# Patient Record
Sex: FEMALE | Race: BLACK OR AFRICAN AMERICAN | NOT HISPANIC OR LATINO | Employment: UNEMPLOYED | URBAN - METROPOLITAN AREA
[De-identification: names, ages, dates, MRNs, and addresses within clinical notes are randomized per-mention and may not be internally consistent; named-entity substitution may affect disease eponyms.]

---

## 2020-10-25 ENCOUNTER — HOSPITAL ENCOUNTER (EMERGENCY)
Facility: CLINIC | Age: 45
Discharge: HOME | End: 2020-10-25
Attending: EMERGENCY MEDICINE | Admitting: EMERGENCY MEDICINE
Payer: MEDICAID

## 2020-10-25 ENCOUNTER — APPOINTMENT (OUTPATIENT)
Dept: ULTRASOUND IMAGING | Facility: CLINIC | Age: 45
End: 2020-10-25
Attending: EMERGENCY MEDICINE
Payer: MEDICAID

## 2020-10-25 VITALS
SYSTOLIC BLOOD PRESSURE: 139 MMHG | DIASTOLIC BLOOD PRESSURE: 94 MMHG | WEIGHT: 241 LBS | RESPIRATION RATE: 16 BRPM | OXYGEN SATURATION: 99 % | HEART RATE: 71 BPM | TEMPERATURE: 97.9 F

## 2020-10-25 DIAGNOSIS — N63.0 BREAST MASS: ICD-10-CM

## 2020-10-25 LAB
ALBUMIN SERPL-MCNC: 3.5 G/DL (ref 3.4–5)
ALP SERPL-CCNC: 117 U/L (ref 40–150)
ALT SERPL W P-5'-P-CCNC: 23 U/L (ref 0–50)
ANION GAP SERPL CALCULATED.3IONS-SCNC: 1 MMOL/L (ref 3–14)
AST SERPL W P-5'-P-CCNC: 12 U/L (ref 0–45)
BASOPHILS # BLD AUTO: 0 10E9/L (ref 0–0.2)
BASOPHILS NFR BLD AUTO: 0.4 %
BILIRUB SERPL-MCNC: 0.3 MG/DL (ref 0.2–1.3)
BUN SERPL-MCNC: 11 MG/DL (ref 7–30)
CALCIUM SERPL-MCNC: 8.8 MG/DL (ref 8.5–10.1)
CHLORIDE SERPL-SCNC: 110 MMOL/L (ref 94–109)
CO2 SERPL-SCNC: 30 MMOL/L (ref 20–32)
CREAT SERPL-MCNC: 0.74 MG/DL (ref 0.52–1.04)
DIFFERENTIAL METHOD BLD: NORMAL
EOSINOPHIL # BLD AUTO: 0.1 10E9/L (ref 0–0.7)
EOSINOPHIL NFR BLD AUTO: 1.9 %
ERYTHROCYTE [DISTWIDTH] IN BLOOD BY AUTOMATED COUNT: 13.2 % (ref 10–15)
GFR SERPL CREATININE-BSD FRML MDRD: >90 ML/MIN/{1.73_M2}
GLUCOSE SERPL-MCNC: 96 MG/DL (ref 70–99)
HCT VFR BLD AUTO: 40.3 % (ref 35–47)
HGB BLD-MCNC: 12.8 G/DL (ref 11.7–15.7)
IMM GRANULOCYTES # BLD: 0 10E9/L (ref 0–0.4)
IMM GRANULOCYTES NFR BLD: 0.3 %
LYMPHOCYTES # BLD AUTO: 2.6 10E9/L (ref 0.8–5.3)
LYMPHOCYTES NFR BLD AUTO: 36.1 %
MCH RBC QN AUTO: 28.5 PG (ref 26.5–33)
MCHC RBC AUTO-ENTMCNC: 31.8 G/DL (ref 31.5–36.5)
MCV RBC AUTO: 90 FL (ref 78–100)
MONOCYTES # BLD AUTO: 0.6 10E9/L (ref 0–1.3)
MONOCYTES NFR BLD AUTO: 7.9 %
NEUTROPHILS # BLD AUTO: 3.8 10E9/L (ref 1.6–8.3)
NEUTROPHILS NFR BLD AUTO: 53.4 %
NRBC # BLD AUTO: 0 10*3/UL
NRBC BLD AUTO-RTO: 0 /100
PLATELET # BLD AUTO: 259 10E9/L (ref 150–450)
POTASSIUM SERPL-SCNC: 4 MMOL/L (ref 3.4–5.3)
PROT SERPL-MCNC: 8 G/DL (ref 6.8–8.8)
RBC # BLD AUTO: 4.49 10E12/L (ref 3.8–5.2)
SODIUM SERPL-SCNC: 141 MMOL/L (ref 133–144)
WBC # BLD AUTO: 7.2 10E9/L (ref 4–11)

## 2020-10-25 PROCEDURE — 85025 COMPLETE CBC W/AUTO DIFF WBC: CPT | Performed by: EMERGENCY MEDICINE

## 2020-10-25 PROCEDURE — 76642 ULTRASOUND BREAST LIMITED: CPT | Mod: LT

## 2020-10-25 PROCEDURE — 80053 COMPREHEN METABOLIC PANEL: CPT | Performed by: EMERGENCY MEDICINE

## 2020-10-25 PROCEDURE — 99284 EMERGENCY DEPT VISIT MOD MDM: CPT | Mod: 25 | Performed by: EMERGENCY MEDICINE

## 2020-10-25 PROCEDURE — 99284 EMERGENCY DEPT VISIT MOD MDM: CPT | Performed by: EMERGENCY MEDICINE

## 2020-10-25 ASSESSMENT — ENCOUNTER SYMPTOMS
FEVER: 0
SHORTNESS OF BREATH: 0
ABDOMINAL PAIN: 0

## 2020-10-25 NOTE — DISCHARGE INSTRUCTIONS
You need to have a mammogram. Please contact the [facility] breast center at [phone] to schedule a mammogram.  You also need to follow-up with the OB/GYN clinic.  This is a [facility].  Call [phone] to make an appointment

## 2020-10-25 NOTE — ED AVS SNAPSHOT
[facility] Emergency Department  [address]  [place] [address]  Phone: [phone]  Fax: [phone]                                    Teresa Sanderson   MRN: [id number]    Department: [facility] Emergency Department   Date of Visit: 10/25/2020           After Visit Summary Signature Page    I have received my discharge instructions, and my questions have been answered. I have discussed any challenges I see with this plan with the nurse or doctor.    ..........................................................................................................................................  Patient/Patient Representative Signature      ..........................................................................................................................................  Patient Representative Print Name and Relationship to Patient    ..................................................               ................................................  Date                                   Time    ..........................................................................................................................................  Reviewed by Signature/Title    ...................................................              ..............................................  Date                                               Time          22EPIC Rev 08/18
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
## 2020-11-09 ENCOUNTER — TELEPHONE (OUTPATIENT)
Dept: MAMMOGRAPHY | Facility: CLINIC | Age: 45
End: 2020-11-09

## 2020-11-09 NOTE — TELEPHONE ENCOUNTER
Left a message for Teresa regarding the recommended follow up imaging from her visit to the ER on 10/25/2020.  Awaiting return phone call, call back number left was [phone].

## 2020-12-05 ENCOUNTER — APPOINTMENT (OUTPATIENT)
Dept: CT IMAGING | Facility: CLINIC | Age: 45
End: 2020-12-05
Payer: MEDICAID

## 2020-12-05 ENCOUNTER — APPOINTMENT (OUTPATIENT)
Dept: GENERAL RADIOLOGY | Facility: CLINIC | Age: 45
End: 2020-12-05
Attending: PHYSICIAN ASSISTANT
Payer: MEDICAID

## 2020-12-05 ENCOUNTER — APPOINTMENT (OUTPATIENT)
Dept: MRI IMAGING | Facility: CLINIC | Age: 45
End: 2020-12-05
Payer: MEDICAID

## 2020-12-05 ENCOUNTER — APPOINTMENT (OUTPATIENT)
Dept: CT IMAGING | Facility: CLINIC | Age: 45
End: 2020-12-05
Attending: PHYSICIAN ASSISTANT
Payer: MEDICAID

## 2020-12-05 ENCOUNTER — HOSPITAL ENCOUNTER (INPATIENT)
Facility: CLINIC | Age: 45
LOS: 5 days | Discharge: HOME | End: 2020-12-10
Attending: FAMILY MEDICINE | Admitting: INTERNAL MEDICINE
Payer: MEDICAID

## 2020-12-05 DIAGNOSIS — C79.51 SPINE METASTASIS: ICD-10-CM

## 2020-12-05 DIAGNOSIS — R31.9 URINARY TRACT INFECTION WITH HEMATURIA: ICD-10-CM

## 2020-12-05 DIAGNOSIS — N63.0 BREAST LUMP OR MASS: ICD-10-CM

## 2020-12-05 DIAGNOSIS — N39.0 URINARY TRACT INFECTION WITH HEMATURIA: ICD-10-CM

## 2020-12-05 DIAGNOSIS — Z12.31 VISIT FOR SCREENING MAMMOGRAM: ICD-10-CM

## 2020-12-05 DIAGNOSIS — U07.1 COVID-19 VIRUS INFECTION: ICD-10-CM

## 2020-12-05 DIAGNOSIS — N39.0 URINARY TRACT INFECTION WITHOUT HEMATURIA, SITE UNSPECIFIED: ICD-10-CM

## 2020-12-05 DIAGNOSIS — R92.8 ABNORMAL MAMMOGRAM OF LEFT BREAST: ICD-10-CM

## 2020-12-05 DIAGNOSIS — R31.9 HEMATURIA SYNDROME: ICD-10-CM

## 2020-12-05 DIAGNOSIS — N63.0 BREAST MASS: ICD-10-CM

## 2020-12-05 LAB
ABO + RH BLD: NORMAL
ABO + RH BLD: NORMAL
ALBUMIN SERPL-MCNC: 3.3 G/DL (ref 3.4–5)
ALBUMIN UR-MCNC: 10 MG/DL
ALP SERPL-CCNC: 127 U/L (ref 40–150)
ALT SERPL W P-5'-P-CCNC: 18 U/L (ref 0–50)
ANION GAP SERPL CALCULATED.3IONS-SCNC: 3 MMOL/L (ref 3–14)
APPEARANCE UR: CLEAR
APTT PPP: 29 SEC (ref 22–37)
AST SERPL W P-5'-P-CCNC: 21 U/L (ref 0–45)
BASOPHILS # BLD AUTO: 0 10E9/L (ref 0–0.2)
BASOPHILS NFR BLD AUTO: 0.3 %
BILIRUB SERPL-MCNC: 0.5 MG/DL (ref 0.2–1.3)
BILIRUB UR QL STRIP: NEGATIVE
BUN SERPL-MCNC: 7 MG/DL (ref 7–30)
CALCIUM SERPL-MCNC: 8.8 MG/DL (ref 8.5–10.1)
CHLORIDE SERPL-SCNC: 109 MMOL/L (ref 94–109)
CK SERPL-CCNC: 60 U/L (ref 30–225)
CO2 SERPL-SCNC: 27 MMOL/L (ref 20–32)
COLOR UR AUTO: YELLOW
CREAT SERPL-MCNC: 0.59 MG/DL (ref 0.52–1.04)
CRP SERPL-MCNC: 56 MG/L (ref 0–8)
D DIMER PPP FEU-MCNC: 1.3 UG/ML FEU (ref 0–0.5)
DIFFERENTIAL METHOD BLD: NORMAL
EOSINOPHIL # BLD AUTO: 0.1 10E9/L (ref 0–0.7)
EOSINOPHIL NFR BLD AUTO: 1.4 %
ERYTHROCYTE [DISTWIDTH] IN BLOOD BY AUTOMATED COUNT: 13.7 % (ref 10–15)
FERRITIN SERPL-MCNC: 64 NG/ML (ref 8–252)
FIBRINOGEN PPP-MCNC: 529 MG/DL (ref 200–420)
GFR SERPL CREATININE-BSD FRML MDRD: >90 ML/MIN/{1.73_M2}
GLUCOSE SERPL-MCNC: 107 MG/DL (ref 70–99)
GLUCOSE UR STRIP-MCNC: NEGATIVE MG/DL
HCG SERPL QL: NEGATIVE
HCT VFR BLD AUTO: 38.1 % (ref 35–47)
HGB BLD-MCNC: 12.1 G/DL (ref 11.7–15.7)
HGB UR QL STRIP: NEGATIVE
IMM GRANULOCYTES # BLD: 0 10E9/L (ref 0–0.4)
IMM GRANULOCYTES NFR BLD: 0.4 %
INR PPP: 1.13 (ref 0.86–1.14)
KETONES UR STRIP-MCNC: NEGATIVE MG/DL
LABORATORY COMMENT REPORT: ABNORMAL
LDH SERPL L TO P-CCNC: 151 U/L (ref 81–234)
LEUKOCYTE ESTERASE UR QL STRIP: ABNORMAL
LYMPHOCYTES # BLD AUTO: 1.9 10E9/L (ref 0.8–5.3)
LYMPHOCYTES NFR BLD AUTO: 27.1 %
MCH RBC QN AUTO: 28 PG (ref 26.5–33)
MCHC RBC AUTO-ENTMCNC: 31.8 G/DL (ref 31.5–36.5)
MCV RBC AUTO: 88 FL (ref 78–100)
MONOCYTES # BLD AUTO: 0.6 10E9/L (ref 0–1.3)
MONOCYTES NFR BLD AUTO: 8.2 %
MUCOUS THREADS #/AREA URNS LPF: PRESENT /LPF
NEUTROPHILS # BLD AUTO: 4.4 10E9/L (ref 1.6–8.3)
NEUTROPHILS NFR BLD AUTO: 62.6 %
NITRATE UR QL: NEGATIVE
NRBC # BLD AUTO: 0 10*3/UL
NRBC BLD AUTO-RTO: 0 /100
PH UR STRIP: 7.5 PH (ref 5–7)
PLATELET # BLD AUTO: 229 10E9/L (ref 150–450)
POTASSIUM SERPL-SCNC: 4.6 MMOL/L (ref 3.4–5.3)
PROT SERPL-MCNC: 8.2 G/DL (ref 6.8–8.8)
RBC # BLD AUTO: 4.32 10E12/L (ref 3.8–5.2)
RBC #/AREA URNS AUTO: 1 /HPF (ref 0–2)
RETICS # AUTO: 60.6 10E9/L (ref 25–95)
RETICS/RBC NFR AUTO: 1.6 % (ref 0.5–2)
SARS-COV-2 RNA SPEC QL NAA+PROBE: NORMAL
SARS-COV-2 RNA SPEC QL NAA+PROBE: POSITIVE
SODIUM SERPL-SCNC: 139 MMOL/L (ref 133–144)
SOURCE: ABNORMAL
SP GR UR STRIP: 1.02 (ref 1–1.03)
SPECIMEN EXP DATE BLD: NORMAL
SPECIMEN SOURCE: ABNORMAL
SPECIMEN SOURCE: NORMAL
SQUAMOUS #/AREA URNS AUTO: 3 /HPF (ref 0–1)
TROPONIN I SERPL-MCNC: <0.015 UG/L (ref 0–0.04)
UROBILINOGEN UR STRIP-MCNC: 2 MG/DL (ref 0–2)
WBC # BLD AUTO: 7.1 10E9/L (ref 4–11)
WBC #/AREA URNS AUTO: 15 /HPF (ref 0–5)

## 2020-12-05 PROCEDURE — 82728 ASSAY OF FERRITIN: CPT | Performed by: PHYSICIAN ASSISTANT

## 2020-12-05 PROCEDURE — 80053 COMPREHEN METABOLIC PANEL: CPT | Performed by: STUDENT IN AN ORGANIZED HEALTH CARE EDUCATION/TRAINING PROGRAM

## 2020-12-05 PROCEDURE — 250N000011 HC RX IP 250 OP 636: Performed by: FAMILY MEDICINE

## 2020-12-05 PROCEDURE — 96376 TX/PRO/DX INJ SAME DRUG ADON: CPT | Performed by: FAMILY MEDICINE

## 2020-12-05 PROCEDURE — U0003 INFECTIOUS AGENT DETECTION BY NUCLEIC ACID (DNA OR RNA); SEVERE ACUTE RESPIRATORY SYNDROME CORONAVIRUS 2 (SARS-COV-2) (CORONAVIRUS DISEASE [COVID-19]), AMPLIFIED PROBE TECHNIQUE, MAKING USE OF HIGH THROUGHPUT TECHNOLOGIES AS DESCRIBED BY CMS-2020-01-R: HCPCS | Performed by: STUDENT IN AN ORGANIZED HEALTH CARE EDUCATION/TRAINING PROGRAM

## 2020-12-05 PROCEDURE — 250N000011 HC RX IP 250 OP 636: Performed by: STUDENT IN AN ORGANIZED HEALTH CARE EDUCATION/TRAINING PROGRAM

## 2020-12-05 PROCEDURE — 96361 HYDRATE IV INFUSION ADD-ON: CPT | Performed by: FAMILY MEDICINE

## 2020-12-05 PROCEDURE — 86900 BLOOD TYPING SEROLOGIC ABO: CPT | Performed by: PHYSICIAN ASSISTANT

## 2020-12-05 PROCEDURE — 74176 CT ABD & PELVIS W/O CONTRAST: CPT

## 2020-12-05 PROCEDURE — 99207 PR CDG-MDM COMPONENT: MEETS MODERATE - UP CODED: CPT | Performed by: INTERNAL MEDICINE

## 2020-12-05 PROCEDURE — 258N000003 HC RX IP 258 OP 636: Performed by: STUDENT IN AN ORGANIZED HEALTH CARE EDUCATION/TRAINING PROGRAM

## 2020-12-05 PROCEDURE — 250N000013 HC RX MED GY IP 250 OP 250 PS 637: Performed by: FAMILY MEDICINE

## 2020-12-05 PROCEDURE — 71260 CT THORAX DX C+: CPT | Mod: 26 | Performed by: RADIOLOGY

## 2020-12-05 PROCEDURE — 84703 CHORIONIC GONADOTROPIN ASSAY: CPT | Performed by: STUDENT IN AN ORGANIZED HEALTH CARE EDUCATION/TRAINING PROGRAM

## 2020-12-05 PROCEDURE — 85610 PROTHROMBIN TIME: CPT | Performed by: PHYSICIAN ASSISTANT

## 2020-12-05 PROCEDURE — 99223 1ST HOSP IP/OBS HIGH 75: CPT | Mod: AI | Performed by: INTERNAL MEDICINE

## 2020-12-05 PROCEDURE — 96374 THER/PROPH/DIAG INJ IV PUSH: CPT | Performed by: FAMILY MEDICINE

## 2020-12-05 PROCEDURE — 85384 FIBRINOGEN ACTIVITY: CPT | Performed by: PHYSICIAN ASSISTANT

## 2020-12-05 PROCEDURE — 85025 COMPLETE CBC W/AUTO DIFF WBC: CPT | Performed by: STUDENT IN AN ORGANIZED HEALTH CARE EDUCATION/TRAINING PROGRAM

## 2020-12-05 PROCEDURE — 86901 BLOOD TYPING SEROLOGIC RH(D): CPT | Performed by: PHYSICIAN ASSISTANT

## 2020-12-05 PROCEDURE — 96375 TX/PRO/DX INJ NEW DRUG ADDON: CPT | Performed by: FAMILY MEDICINE

## 2020-12-05 PROCEDURE — 99207 PR APP CREDIT; MD BILLING SHARED VISIT: CPT | Performed by: PHYSICIAN ASSISTANT

## 2020-12-05 PROCEDURE — 88112 CYTOPATH CELL ENHANCE TECH: CPT | Mod: TC | Performed by: PHYSICIAN ASSISTANT

## 2020-12-05 PROCEDURE — 36415 COLL VENOUS BLD VENIPUNCTURE: CPT | Performed by: PHYSICIAN ASSISTANT

## 2020-12-05 PROCEDURE — 83615 LACTATE (LD) (LDH) ENZYME: CPT | Performed by: PHYSICIAN ASSISTANT

## 2020-12-05 PROCEDURE — 85379 FIBRIN DEGRADATION QUANT: CPT | Performed by: PHYSICIAN ASSISTANT

## 2020-12-05 PROCEDURE — 71045 X-RAY EXAM CHEST 1 VIEW: CPT

## 2020-12-05 PROCEDURE — 88120 CYTP URNE 3-5 PROBES EA SPEC: CPT | Mod: TC | Performed by: PHYSICIAN ASSISTANT

## 2020-12-05 PROCEDURE — 120N000002 HC R&B MED SURG/OB UMMC

## 2020-12-05 PROCEDURE — 88120 CYTP URNE 3-5 PROBES EA SPEC: CPT | Mod: 26 | Performed by: PATHOLOGY

## 2020-12-05 PROCEDURE — 85045 AUTOMATED RETICULOCYTE COUNT: CPT | Performed by: PHYSICIAN ASSISTANT

## 2020-12-05 PROCEDURE — 99285 EMERGENCY DEPT VISIT HI MDM: CPT | Mod: 25 | Performed by: FAMILY MEDICINE

## 2020-12-05 PROCEDURE — 71260 CT THORAX DX C+: CPT

## 2020-12-05 PROCEDURE — 82550 ASSAY OF CK (CPK): CPT | Performed by: PHYSICIAN ASSISTANT

## 2020-12-05 PROCEDURE — 71045 X-RAY EXAM CHEST 1 VIEW: CPT | Mod: 26 | Performed by: RADIOLOGY

## 2020-12-05 PROCEDURE — 250N000013 HC RX MED GY IP 250 OP 250 PS 637: Performed by: PHYSICIAN ASSISTANT

## 2020-12-05 PROCEDURE — 88112 CYTOPATH CELL ENHANCE TECH: CPT | Mod: 26 | Performed by: PATHOLOGY

## 2020-12-05 PROCEDURE — 86140 C-REACTIVE PROTEIN: CPT | Performed by: PHYSICIAN ASSISTANT

## 2020-12-05 PROCEDURE — 85730 THROMBOPLASTIN TIME PARTIAL: CPT | Performed by: PHYSICIAN ASSISTANT

## 2020-12-05 PROCEDURE — C9803 HOPD COVID-19 SPEC COLLECT: HCPCS | Performed by: FAMILY MEDICINE

## 2020-12-05 PROCEDURE — 72148 MRI LUMBAR SPINE W/O DYE: CPT

## 2020-12-05 PROCEDURE — 81001 URINALYSIS AUTO W/SCOPE: CPT | Performed by: STUDENT IN AN ORGANIZED HEALTH CARE EDUCATION/TRAINING PROGRAM

## 2020-12-05 PROCEDURE — 84484 ASSAY OF TROPONIN QUANT: CPT | Performed by: PHYSICIAN ASSISTANT

## 2020-12-05 PROCEDURE — 85300 ANTITHROMBIN III ACTIVITY: CPT | Performed by: PHYSICIAN ASSISTANT

## 2020-12-05 PROCEDURE — 99285 EMERGENCY DEPT VISIT HI MDM: CPT | Mod: GC | Performed by: FAMILY MEDICINE

## 2020-12-05 PROCEDURE — 74178 CT ABD&PLV WO CNTR FLWD CNTR: CPT | Mod: 26 | Performed by: RADIOLOGY

## 2020-12-05 PROCEDURE — 83520 IMMUNOASSAY QUANT NOS NONAB: CPT | Performed by: PHYSICIAN ASSISTANT

## 2020-12-05 RX ORDER — KETOROLAC TROMETHAMINE 30 MG/ML
30 INJECTION, SOLUTION INTRAMUSCULAR; INTRAVENOUS EVERY 6 HOURS PRN
Status: DISCONTINUED | OUTPATIENT
Start: 2020-12-05 | End: 2020-12-05

## 2020-12-05 RX ORDER — SODIUM CHLORIDE 9 MG/ML
INJECTION, SOLUTION INTRAVENOUS CONTINUOUS
Status: DISCONTINUED | OUTPATIENT
Start: 2020-12-05 | End: 2020-12-05

## 2020-12-05 RX ORDER — OXYCODONE HYDROCHLORIDE 5 MG/1
5 TABLET ORAL ONCE
Status: COMPLETED | OUTPATIENT
Start: 2020-12-05 | End: 2020-12-05

## 2020-12-05 RX ORDER — AMOXICILLIN 250 MG
1 CAPSULE ORAL 2 TIMES DAILY
Status: DISCONTINUED | OUTPATIENT
Start: 2020-12-05 | End: 2020-12-10 | Stop reason: HOSPADM

## 2020-12-05 RX ORDER — AMOXICILLIN 250 MG
2 CAPSULE ORAL 2 TIMES DAILY
Status: DISCONTINUED | OUTPATIENT
Start: 2020-12-05 | End: 2020-12-10 | Stop reason: HOSPADM

## 2020-12-05 RX ORDER — OXYCODONE HYDROCHLORIDE 5 MG/1
5-10 TABLET ORAL
Status: DISCONTINUED | OUTPATIENT
Start: 2020-12-05 | End: 2020-12-09

## 2020-12-05 RX ORDER — IOPAMIDOL 755 MG/ML
135 INJECTION, SOLUTION INTRAVASCULAR ONCE
Status: COMPLETED | OUTPATIENT
Start: 2020-12-05 | End: 2020-12-05

## 2020-12-05 RX ORDER — POLYETHYLENE GLYCOL 3350 17 G/17G
17 POWDER, FOR SOLUTION ORAL DAILY PRN
Status: DISCONTINUED | OUTPATIENT
Start: 2020-12-05 | End: 2020-12-10 | Stop reason: HOSPADM

## 2020-12-05 RX ORDER — HYDROMORPHONE HYDROCHLORIDE 1 MG/ML
0.3 INJECTION, SOLUTION INTRAMUSCULAR; INTRAVENOUS; SUBCUTANEOUS
Status: COMPLETED | OUTPATIENT
Start: 2020-12-05 | End: 2020-12-05

## 2020-12-05 RX ORDER — BISACODYL 10 MG
10 SUPPOSITORY, RECTAL RECTAL DAILY PRN
Status: DISCONTINUED | OUTPATIENT
Start: 2020-12-05 | End: 2020-12-10 | Stop reason: HOSPADM

## 2020-12-05 RX ORDER — NALOXONE HYDROCHLORIDE 0.4 MG/ML
0.2 INJECTION, SOLUTION INTRAMUSCULAR; INTRAVENOUS; SUBCUTANEOUS
Status: DISCONTINUED | OUTPATIENT
Start: 2020-12-05 | End: 2020-12-10 | Stop reason: HOSPADM

## 2020-12-05 RX ORDER — LIDOCAINE 40 MG/G
CREAM TOPICAL
Status: DISCONTINUED | OUTPATIENT
Start: 2020-12-05 | End: 2020-12-10 | Stop reason: HOSPADM

## 2020-12-05 RX ORDER — KETOROLAC TROMETHAMINE 30 MG/ML
30 INJECTION, SOLUTION INTRAMUSCULAR; INTRAVENOUS ONCE
Status: COMPLETED | OUTPATIENT
Start: 2020-12-05 | End: 2020-12-05

## 2020-12-05 RX ORDER — POLYETHYLENE GLYCOL 3350 17 G/17G
17 POWDER, FOR SOLUTION ORAL DAILY
Status: DISCONTINUED | OUTPATIENT
Start: 2020-12-05 | End: 2020-12-10 | Stop reason: HOSPADM

## 2020-12-05 RX ORDER — NICOTINE 21 MG/24HR
1 PATCH, TRANSDERMAL 24 HOURS TRANSDERMAL DAILY
Status: DISCONTINUED | OUTPATIENT
Start: 2020-12-05 | End: 2020-12-10 | Stop reason: HOSPADM

## 2020-12-05 RX ORDER — NALOXONE HYDROCHLORIDE 0.4 MG/ML
0.4 INJECTION, SOLUTION INTRAMUSCULAR; INTRAVENOUS; SUBCUTANEOUS
Status: DISCONTINUED | OUTPATIENT
Start: 2020-12-05 | End: 2020-12-10 | Stop reason: HOSPADM

## 2020-12-05 RX ORDER — ONDANSETRON 2 MG/ML
4 INJECTION INTRAMUSCULAR; INTRAVENOUS EVERY 6 HOURS PRN
Status: DISCONTINUED | OUTPATIENT
Start: 2020-12-05 | End: 2020-12-06

## 2020-12-05 RX ORDER — ONDANSETRON 4 MG/1
4 TABLET, ORALLY DISINTEGRATING ORAL EVERY 6 HOURS PRN
Status: DISCONTINUED | OUTPATIENT
Start: 2020-12-05 | End: 2020-12-10 | Stop reason: HOSPADM

## 2020-12-05 RX ORDER — ACETAMINOPHEN 325 MG/1
325 TABLET ORAL EVERY 4 HOURS PRN
Status: DISCONTINUED | OUTPATIENT
Start: 2020-12-05 | End: 2020-12-10 | Stop reason: HOSPADM

## 2020-12-05 RX ORDER — HYDROMORPHONE HYDROCHLORIDE 1 MG/ML
0.5 INJECTION, SOLUTION INTRAMUSCULAR; INTRAVENOUS; SUBCUTANEOUS
Status: DISCONTINUED | OUTPATIENT
Start: 2020-12-05 | End: 2020-12-09

## 2020-12-05 RX ADMIN — OXYCODONE HYDROCHLORIDE 5 MG: 5 TABLET ORAL at 21:39

## 2020-12-05 RX ADMIN — ACETAMINOPHEN 325 MG: 325 TABLET, FILM COATED ORAL at 21:40

## 2020-12-05 RX ADMIN — DOCUSATE SODIUM AND SENNOSIDES 1 TABLET: 8.6; 5 TABLET ORAL at 21:39

## 2020-12-05 RX ADMIN — OXYCODONE HYDROCHLORIDE 5 MG: 5 TABLET ORAL at 12:57

## 2020-12-05 RX ADMIN — KETOROLAC TROMETHAMINE 30 MG: 30 INJECTION, SOLUTION INTRAMUSCULAR at 10:32

## 2020-12-05 RX ADMIN — POLYETHYLENE GLYCOL 3350 17 G: 17 POWDER, FOR SOLUTION ORAL at 21:39

## 2020-12-05 RX ADMIN — KETOROLAC TROMETHAMINE 30 MG: 30 INJECTION, SOLUTION INTRAMUSCULAR at 16:27

## 2020-12-05 RX ADMIN — IOPAMIDOL 135 ML: 755 INJECTION, SOLUTION INTRAVENOUS at 20:51

## 2020-12-05 RX ADMIN — NICOTINE 1 PATCH: 14 PATCH, EXTENDED RELEASE TRANSDERMAL at 21:40

## 2020-12-05 RX ADMIN — HYDROMORPHONE HYDROCHLORIDE 0.3 MG: 1 INJECTION, SOLUTION INTRAMUSCULAR; INTRAVENOUS; SUBCUTANEOUS at 09:43

## 2020-12-05 RX ADMIN — SODIUM CHLORIDE 1000 ML: 9 INJECTION, SOLUTION INTRAVENOUS at 09:43

## 2020-12-05 ASSESSMENT — ACTIVITIES OF DAILY LIVING (ADL)
WEAR_GLASSES_OR_BLIND: NO
DOING_ERRANDS_INDEPENDENTLY_DIFFICULTY: NO
WALKING_OR_CLIMBING_STAIRS_DIFFICULTY: NO
CONCENTRATING,_REMEMBERING_OR_MAKING_DECISIONS_DIFFICULTY: NO
PATIENT_/_FAMILY_COMMUNICATION_STYLE: SPOKEN LANGUAGE (ENGLISH OR BILINGUAL)
DIFFICULTY_EATING/SWALLOWING: NO
DIFFICULTY_COMMUNICATING: NO
TOILETING_ISSUES: NO
ADLS_ACUITY_SCORE: 15
FALL_HISTORY_WITHIN_LAST_SIX_MONTHS: NO
DRESSING/BATHING_DIFFICULTY: NO
HEARING_DIFFICULTY_OR_DEAF: NO
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
## 2020-12-05 NOTE — ED PROVIDER NOTES
"ED Provider Note  [facility]      History     Chief Complaint   Patient presents with     Hematuria     Back Pain     lower     HPI  Teresa Sanderson is a 45 year old female with a history of DVT, pyelonephritis, and L breast mass presenting today with 3 days of blood in her urine and bilateral lower back pain. She reports that her first urination of every day has had blood in it, which resolves throughout the day. Her back pain is left > right, feels \"like a ton of bricks laying there.\" She also endorses LUQ pain and some pain radiating down her left leg. Has been feeling fatigued lately with loss of appetite, which she thinks is related to stress. She has self reported 45 lb weight loss since July.    Most recent period was 11/21. Denies any trauma to the genitourinary area. Denies diarrhea or constipation, SOB, pain with urination, abnormal vaginal discharge. No smoking history.    Of note, this patient was seen in this ED in Oct 2020 for a growing L sided breast mass. She reports that she first felt this mass in Nov 2019, had a mammogram in [place], and was told it was benign. At that time, the mass was less than quarter sized. She noticed that it has been growing for the last 5 months and came to the ED 10/2020 for pain, nipple discharge, and armpit tenderness. At that time, the breast US did not identify a mass or abscess in her breast but the ED provider was concerned for malignancy and recommended follow-up imaging and with OBGYN. Unfortunately, she was lost to follow-up until today.    Furthermore,  Ms. Sanderson has been on Warfarin and most recently, Eliquis for DVT prophylaxis. However, she has not been taking this for the last 5 months due to lack of PCP and insurance.    Social history: this patient moved from [place] in July to assist with daughter and grandchildren. She is essentially homeless, has no health insurance, or connections to a primary care provider.    Past " Medical History  History reviewed. No pertinent past medical history.  History reviewed. No pertinent surgical history.  No current outpatient medications on file.    No Known Allergies  Family History  No family history on file.  Social History   Social History     Tobacco Use     Smoking status: Never Smoker     Smokeless tobacco: Never Used   Substance Use Topics     Alcohol use: Not Currently     Comment: occ     Drug use: Yes     Types: Marijuana     Comment: occ      Past medical history, past surgical history, medications, allergies, family history, and social history were reviewed with the patient. No additional pertinent items.       Review of Systems  A complete review of systems was performed with pertinent positives and negatives noted in the HPI, and all other systems negative.    Physical Exam   BP: (!) 146/86(R arm)  Pulse: 80  Temp: 96.8  F (36  C)  Resp: 16  Weight: 104.3 kg (230 lb)  SpO2: 98 %  Physical Exam  Vitals signs reviewed.   Constitutional:       Comments: Appears uncomfortable laying on her back; holding her L flank   HENT:      Head: Normocephalic.   Eyes:      Conjunctiva/sclera: Conjunctivae normal.      Pupils: Pupils are equal, round, and reactive to light.   Neck:      Musculoskeletal: No neck rigidity.   Cardiovascular:      Rate and Rhythm: Normal rate and regular rhythm.      Pulses: Normal pulses.      Heart sounds: Normal heart sounds.   Pulmonary:      Effort: Pulmonary effort is normal.      Breath sounds: Normal breath sounds. No wheezing, rhonchi or rales.   Chest:      Breasts: Breasts are asymmetrical.         Right: Normal.         Left: Mass (approx 1.5in x 3in), nipple discharge (history of discharge but none seen today), skin change (skin pulling over superiolateral nipple) and tenderness (to light palpation over breast) present.       Abdominal:      General: Abdomen is flat.      Palpations: Abdomen is soft. There is no mass.      Tenderness: There is right CVA  tenderness, left CVA tenderness and guarding (RUQ). There is no rebound.      Hernia: No hernia is present.   Musculoskeletal:         General: Tenderness (mild down L thigh) present.      Lumbar back: She exhibits bony tenderness and pain.      Right lower leg: No edema.      Left lower leg: No edema.   Skin:     General: Skin is warm.   Neurological:      Mental Status: She is alert and oriented to person, place, and time.   Psychiatric:         Mood and Affect: Mood normal.         Judgment: Judgment normal.       ED Course     ED Course as of Dec 05 1447   Sat Dec 05, 2020   1342 Lumbar spine MRI w/o contrast - surgery >10 yrs or none        Results for orders placed or performed during the hospital encounter of 12/05/20   Abd/pelvis CT no contrast - [facility] Protocol     Status: None    Narrative    CT ABDOMEN AND PELVIS WITHOUT CONTRAST  12/5/2020 10:30 AM    CLINICAL HISTORY: Left flank pain.    TECHNIQUE: CT scan of the abdomen and pelvis was performed without IV  contrast. Multiplanar reformats were obtained. Dose reduction  techniques were used.  CONTRAST: None.    COMPARISON: None.    FINDINGS:   LOWER CHEST: Mild patchy groundglass opacities at both lung bases,  more prominent in the left lower lobe.    HEPATOBILIARY: Unremarkable. No hepatic masses are seen.    PANCREAS: Normal.    SPLEEN: Normal.    ADRENAL GLANDS: Normal.    KIDNEYS/BLADDER: Cortical cyst in the lower pole of the right kidney  measures 3 cm. Otherwise unremarkable. No urinary calculi. No  hydronephrosis.    BOWEL: No bowel obstruction. No convincing evidence for colitis or  diverticulitis. Unremarkable appendix.    PELVIC ORGANS: Unremarkable.    LYMPH NODES: No enlarged lymph nodes are identified in the abdomen or  pelvis.    VASCULATURE: Unremarkable.    ADDITIONAL FINDINGS: None.    MUSCULOSKELETAL: Degenerative changes are noted in the lower lumbar  spine. There is an ill-defined lytic lesion within L4, with a rind of  associated  soft tissue about the L4 vertebral body, suspicious for  malignancy. There are several small areas of subtle lytic change in  the pelvic bones, which could also represent metastatic disease (for  example series 3 image 183 along the anterior aspect of the left  acetabulum).      Impression    IMPRESSION:   1.  No urinary calculi or evidence for urinary obstruction.  2.  Ill-defined L4 lytic lesion with a rind of associated soft tissue  about the L4 vertebral body, suspicious for malignancy, such as  metastatic disease. Vertebral body infection is an additional  differential consideration, but is considered less likely. Lumbar  spine MRI is recommended for further characterization.  3.  Several smaller ill-defined areas of lytic change are noted within  the pelvic bones, which could also represent metastatic disease, and  should be further evaluated with MRI.  4.  There is a 3 cm low-density lesion in the lower pole of the right  kidney. This finding is not well evaluated on this noncontrast scan,  but most likely represents a cyst. Ultrasound is recommended to  confirm the cystic nature of this right renal lesion.    5.  I discussed these findings with Dr. Richards at 11:15 AM on  12/15/2020.    DANIEL FERRARA MD   Lumbar spine MRI w/o contrast - surgery >10 yrs or none     Status: None    Narrative    MR LUMBAR SPINE WITHOUT CONTRAST 12/5/2020 12:22 PM     HISTORY: Back pain, risk factors (osteoporosis or chronic steroid use  or elderly). CT had concern for lumbar mets +/- pelvic lytic lesions  2/2 left breast mass.    TECHNIQUE: Multiplanar multisequence images were obtained through the  lumbar spine without contrast.    COMPARISON: CT of the abdomen and pelvis on same date.    FINDINGS: Five lumbar type vertebral bodies are assumed. Posterior  alignment is normal. Vertebral body heights are maintained. There is  abnormal signal intensity in the majority of the L4 vertebral body.  There is an associated  right-sided paraspinal mass best seen on axial  images 26 through 28 measuring approximately 2.3 x 1.5 x 3.7 cm. There  is also some minimal involvement of the left paraspinal soft tissues.  In addition, there is a focus of abnormal signal intensity in the  superior aspect of the L5 vertebral body. Focal marrow abnormalities  are also noted left iliac bone. The conus medullaris appearance with  its tip at the L1 level. Cauda equina nerve roots are normal. Please  see CT abdomen and pelvic report on same date for extraspinal soft  tissues.    T12-L1: Normal.    L1-L2: Normal.    L2-L3: Normal.    L3-L4: Facet hypertrophy. No stenosis.    L4-L5: Minimal disc bulging and facet hypertrophy is present causing  mild central canal stenosis, moderate right-sided foraminal stenosis  and mild left-sided foraminal stenosis. There is also a right  paraspinal mass and some infiltrative disease involving the L4  vertebral body.    L5-S1: Minimal posterior osteophyte formation and disc bulging is  present causing some mild to moderate bilateral neural foraminal  stenosis but no central canal stenosis.      Impression    IMPRESSION:  1. Focal bone marrow abnormalities most advanced at L4 where there is  complete involvement of the L4 vertebral body, right paraspinal mass,  and mild involvement of the left paraspinal tissues. Findings are most  likely due to metastatic disease. There is no evidence for any  epidural tumor.  2. Multilevel degenerative changes, most advanced at L4-L5 where there  is mild central canal stenosis, moderate right-sided foraminal  stenosis and mild left-sided foraminal stenosis.    MALENA AMAYA MD   HCG qualitative pregnancy (blood)     Status: None   Result Value Ref Range    HCG Qualitative Serum Negative NEG^Negative   CBC with platelets differential     Status: None   Result Value Ref Range    WBC 7.1 4.0 - 11.0 10e9/L    RBC Count 4.32 3.8 - 5.2 10e12/L    Hemoglobin 12.1 11.7 - 15.7 g/dL     Hematocrit 38.1 35.0 - 47.0 %    MCV 88 78 - 100 fl    MCH 28.0 26.5 - 33.0 pg    MCHC 31.8 31.5 - 36.5 g/dL    RDW 13.7 10.0 - 15.0 %    Platelet Count 229 150 - 450 10e9/L    Diff Method Automated Method     % Neutrophils 62.6 %    % Lymphocytes 27.1 %    % Monocytes 8.2 %    % Eosinophils 1.4 %    % Basophils 0.3 %    % Immature Granulocytes 0.4 %    Nucleated RBCs 0 0 /100    Absolute Neutrophil 4.4 1.6 - 8.3 10e9/L    Absolute Lymphocytes 1.9 0.8 - 5.3 10e9/L    Absolute Monocytes 0.6 0.0 - 1.3 10e9/L    Absolute Eosinophils 0.1 0.0 - 0.7 10e9/L    Absolute Basophils 0.0 0.0 - 0.2 10e9/L    Abs Immature Granulocytes 0.0 0 - 0.4 10e9/L    Absolute Nucleated RBC 0.0    Comprehensive metabolic panel     Status: Abnormal   Result Value Ref Range    Sodium 139 133 - 144 mmol/L    Potassium 4.6 3.4 - 5.3 mmol/L    Chloride 109 94 - 109 mmol/L    Carbon Dioxide 27 20 - 32 mmol/L    Anion Gap 3 3 - 14 mmol/L    Glucose 107 (H) 70 - 99 mg/dL    Urea Nitrogen 7 7 - 30 mg/dL    Creatinine 0.59 0.52 - 1.04 mg/dL    GFR Estimate >90 >60 mL/min/[1.73_m2]    GFR Estimate If Black >90 >60 mL/min/[1.73_m2]    Calcium 8.8 8.5 - 10.1 mg/dL    Bilirubin Total 0.5 0.2 - 1.3 mg/dL    Albumin 3.3 (L) 3.4 - 5.0 g/dL    Protein Total 8.2 6.8 - 8.8 g/dL    Alkaline Phosphatase 127 40 - 150 U/L    ALT 18 0 - 50 U/L    AST 21 0 - 45 U/L   UA reflex to Microscopic     Status: Abnormal   Result Value Ref Range    Color Urine Yellow     Appearance Urine Clear     Glucose Urine Negative NEG^Negative mg/dL    Bilirubin Urine Negative NEG^Negative    Ketones Urine Negative NEG^Negative mg/dL    Specific Gravity Urine 1.019 1.003 - 1.035    Blood Urine Negative NEG^Negative    pH Urine 7.5 (H) 5.0 - 7.0 pH    Protein Albumin Urine 10 (A) NEG^Negative mg/dL    Urobilinogen mg/dL 2.0 0.0 - 2.0 mg/dL    Nitrite Urine Negative NEG^Negative    Leukocyte Esterase Urine Moderate (A) NEG^Negative    Source Midstream Urine     RBC Urine 1 0 - 2 /HPF     "WBC Urine 15 (H) 0 - 5 /HPF    Squamous Epithelial /HPF Urine 3 (H) 0 - 1 /HPF    Mucous Urine Present (A) NEG^Negative /LPF     Medications   0.9% sodium chloride BOLUS (0 mLs Intravenous Stopped 12/5/20 1315)     Followed by   sodium chloride 0.9% infusion ( Intravenous Canceled Entry 12/5/20 1315)   ketorolac (TORADOL) injection 30 mg (has no administration in time range)   HYDROmorphone (PF) (DILAUDID) injection 0.3 mg (0.3 mg Intravenous Given 12/5/20 0943)   ketorolac (TORADOL) injection 30 mg (30 mg Intravenous Given 12/5/20 1032)   oxyCODONE (ROXICODONE) tablet 5 mg (5 mg Oral Given 12/5/20 1257)        Assessments & Plan (with Medical Decision Making)     Teresa Sanderson is a 45 year old female with a history of DVT, pyelonephritis, and breast mass presenting today with 3 days of blood in her urine and bilateral lower back pain. Initial differential diagnosis included kidney stone, pyelonephritis, malignancy, ectopic pregnancy, ovarian torsion. Her ED workup included CBC (wnl), CMP (wnl), Pregnancy test (negative), UA (leukesterase, WBC, protein), Abd/pelvis CT showed no evidence concerning for renal obstruction with a 3cm lesion on the lower pole of the R kidney. However, coincidentally seen on imaging was an ill-defined L4 lytic lesion concerning for metastatic disease as well as smaller lytic lesions within the pelvic bone. Lumbar spine MRI was complete and showed \"focal bone marrow abnormalities most advanced at L4 where there is complete involvement of the L4 vertebral body, right paraspinal mass, and mild involvement of the left paraspinal tissues. Findings are most likely due to metastatic disease.\" These imaging studies in conjunction with her recent weight loss, fatigue, and large breast mass led us to call Heme/Onc. They recommended admission to inpatient for biopsy and the patient was admissed to [place].    While in the ED, Ms. Sanderson spoke with Registration to assist with health insurance " and Social Work regarding shelter placement. Social Work also let us know that if we choose to restart this patient on her Eliquis, sending it to the discharge pharmacy will not require her to make any payments.    We recommend that she be connected with [facility] for outpatient care, as they do not require insurance and have significant social supports for patients.    I have reviewed the nursing notes. I have reviewed the findings, diagnosis, plan and need for follow up with the patient.    New Prescriptions    No medications on file       Final diagnoses:   Breast mass   Spine metastasis (H)   Urinary tract infection with hematuria     Liz Muñoz, DO   she/her/hers   PGY-1  p [phone]    ----    ED Attending Physician Attestation    I Zack Richards MD, cared for this patient with the Resident. I have performed a history and physical examination of the patient and discussed management with the resident. I reviewed the resident's documentation above and agree with the documented findings and plan of care.    Summary of HPI, PE, ED Course   Patient is a 45 year old female evaluated in the emergency department for back pain, flank pain, daily episodes of gross hematuria, and fatigue.  Patient is known to have previously documented breast mass which was first discovered over a year ago, but which unfortunately was lost to follow-up, and also has a history of DVT and PE for which she was previously taking Eliquis, however is not currently compliant due to lack of a physician and insurance. Exam notable for palpable left breast mass documented above by Dr. Muñoz, paraspinous lumbar tenderness and left flank tenderness, benign abdominal exam, no focal neurologic abnormality of the lower extremities. ED course notable for UA consistent with possible UTI otherwise unremarkable diagnostic labs, however imaging reveals what appears to be evidence of metastatic disease of the spine.  Patient does not have signs of cauda  equina syndrome or focal neurologic deficit but is high risk for loss to follow-up, requires expedient work-up, and does need pain control.  Patient may also need to be started on anticoagulation.  After the completion of care in the emergency department, the patient was admitted to inpatient.    Critical Care & Procedures  Not applicable.    Medical Decision Making  The medical record was reviewed and interpreted.  Current labs reviewed and interpreted.  Previous labs reviewed and interpreted.  Current images reviewed and interpreted: CT abdomen and MRI L-spine.      Zack Richards MD  Emergency Medicine       Zack Richards MD  [facility] EMERGENCY DEPARTMENT  12/5/2020     Zack Richards MD  12/05/20 [id number]

## 2020-12-05 NOTE — ED TRIAGE NOTES
Pt states bloody urine (first urine of day) started 2 days ago.   Back pain-low for last 3 days.

## 2020-12-05 NOTE — PROGRESS NOTES
Medicine triage note    Called by [facility] with admission to medicine  Briefly, 45F with longstanding history of growing left breast mass  Unable to access care until now  P/w back pain today to ED  Large left breast mass on exam  +MRI consistent with metastatic lesions  No previous history or diagnosis of malignancy    Of note, has difficult social situation and high risk for loss to follow-up    Admit to med [facility] for management of back pain, serial neurological exams, and biopsy and staging for what is highly concerning for breast cancer metastatic to spine    Harjit Riley MD

## 2020-12-05 NOTE — ED NOTES
[facility]    ED Nurse to Floor Handoff     Teresa Sanderson is a 45 year old female who speaks English and lives with others,  in a home  They arrived in the ED by car from home    ED Chief Complaint: Hematuria and Back Pain (lower)    ED Dx;   Final diagnoses:   Breast mass   Spine metastasis (H)   Urinary tract infection with hematuria         Needed?: No    Allergies: No Known Allergies.  Past Medical Hx: History reviewed. No pertinent past medical history.   Baseline Mental status: WDL  Current Mental Status changes: at basesline    Infection present or suspected this encounter: yes urinary  Sepsis suspected: No  Isolation type: No active isolations  Patient tested for COVID 19 prior to admission: YES     Activity level - Baseline/Home:  Independent  Activity Level - Current:   Independent    Bariatric equipment needed?: No    In the ED these meds were given:   Medications   0.9% sodium chloride BOLUS (0 mLs Intravenous Stopped 12/5/20 1315)     Followed by   sodium chloride 0.9% infusion ( Intravenous Canceled Entry 12/5/20 1315)   ketorolac (TORADOL) injection 30 mg (has no administration in time range)   HYDROmorphone (PF) (DILAUDID) injection 0.3 mg (0.3 mg Intravenous Given 12/5/20 0943)   ketorolac (TORADOL) injection 30 mg (30 mg Intravenous Given 12/5/20 1032)   oxyCODONE (ROXICODONE) tablet 5 mg (5 mg Oral Given 12/5/20 1257)       Drips running?  No    Home pump  No    Current LDAs  Peripheral IV 12/05/20 Left Upper forearm (Active)   Site Assessment WDL 12/05/20 0933   Line Status Saline locked 12/05/20 0933   Phlebitis Scale 0-->no symptoms 12/05/20 0933   Number of days: 0       Labs results:   Labs Ordered and Resulted from Time of ED Arrival Up to the Time of Departure from the ED   COMPREHENSIVE METABOLIC PANEL - Abnormal; Notable for the following components:       Result Value    Glucose 107 (*)     Albumin 3.3 (*)     All other  components within normal limits   URINE MACROSCOPIC WITH REFLEX TO MICRO - Abnormal; Notable for the following components:    pH Urine 7.5 (*)     Protein Albumin Urine 10 (*)     Leukocyte Esterase Urine Moderate (*)     WBC Urine 15 (*)     Squamous Epithelial /HPF Urine 3 (*)     Mucous Urine Present (*)     All other components within normal limits   HCG QUALITATIVE   CBC WITH PLATELETS DIFFERENTIAL   COVID-19 VIRUS (CORONAVIRUS) BY PCR       Imaging Studies:   Recent Results (from the past 24 hour(s))   Abd/pelvis CT no contrast - [facility] Protocol    Narrative    CT ABDOMEN AND PELVIS WITHOUT CONTRAST  12/5/2020 10:30 AM    CLINICAL HISTORY: Left flank pain.    TECHNIQUE: CT scan of the abdomen and pelvis was performed without IV  contrast. Multiplanar reformats were obtained. Dose reduction  techniques were used.  CONTRAST: None.    COMPARISON: None.    FINDINGS:   LOWER CHEST: Mild patchy groundglass opacities at both lung bases,  more prominent in the left lower lobe.    HEPATOBILIARY: Unremarkable. No hepatic masses are seen.    PANCREAS: Normal.    SPLEEN: Normal.    ADRENAL GLANDS: Normal.    KIDNEYS/BLADDER: Cortical cyst in the lower pole of the right kidney  measures 3 cm. Otherwise unremarkable. No urinary calculi. No  hydronephrosis.    BOWEL: No bowel obstruction. No convincing evidence for colitis or  diverticulitis. Unremarkable appendix.    PELVIC ORGANS: Unremarkable.    LYMPH NODES: No enlarged lymph nodes are identified in the abdomen or  pelvis.    VASCULATURE: Unremarkable.    ADDITIONAL FINDINGS: None.    MUSCULOSKELETAL: Degenerative changes are noted in the lower lumbar  spine. There is an ill-defined lytic lesion within L4, with a rind of  associated soft tissue about the L4 vertebral body, suspicious for  malignancy. There are several small areas of subtle lytic change in  the pelvic bones, which could also represent metastatic disease (for  example series 3 image 183 along the anterior  aspect of the left  acetabulum).      Impression    IMPRESSION:   1.  No urinary calculi or evidence for urinary obstruction.  2.  Ill-defined L4 lytic lesion with a rind of associated soft tissue  about the L4 vertebral body, suspicious for malignancy, such as  metastatic disease. Vertebral body infection is an additional  differential consideration, but is considered less likely. Lumbar  spine MRI is recommended for further characterization.  3.  Several smaller ill-defined areas of lytic change are noted within  the pelvic bones, which could also represent metastatic disease, and  should be further evaluated with MRI.  4.  There is a 3 cm low-density lesion in the lower pole of the right  kidney. This finding is not well evaluated on this noncontrast scan,  but most likely represents a cyst. Ultrasound is recommended to  confirm the cystic nature of this right renal lesion.    5.  I discussed these findings with Dr. Richards at 11:15 AM on  12/15/2020.    DANIEL FERRARA MD   Lumbar spine MRI w/o contrast - surgery >10 yrs or none    Narrative    MR LUMBAR SPINE WITHOUT CONTRAST 12/5/2020 12:22 PM     HISTORY: Back pain, risk factors (osteoporosis or chronic steroid use  or elderly). CT had concern for lumbar mets +/- pelvic lytic lesions  2/2 left breast mass.    TECHNIQUE: Multiplanar multisequence images were obtained through the  lumbar spine without contrast.    COMPARISON: CT of the abdomen and pelvis on same date.    FINDINGS: Five lumbar type vertebral bodies are assumed. Posterior  alignment is normal. Vertebral body heights are maintained. There is  abnormal signal intensity in the majority of the L4 vertebral body.  There is an associated right-sided paraspinal mass best seen on axial  images 26 through 28 measuring approximately 2.3 x 1.5 x 3.7 cm. There  is also some minimal involvement of the left paraspinal soft tissues.  In addition, there is a focus of abnormal signal intensity in the  superior  aspect of the L5 vertebral body. Focal marrow abnormalities  are also noted left iliac bone. The conus medullaris appearance with  its tip at the L1 level. Cauda equina nerve roots are normal. Please  see CT abdomen and pelvic report on same date for extraspinal soft  tissues.    T12-L1: Normal.    L1-L2: Normal.    L2-L3: Normal.    L3-L4: Facet hypertrophy. No stenosis.    L4-L5: Minimal disc bulging and facet hypertrophy is present causing  mild central canal stenosis, moderate right-sided foraminal stenosis  and mild left-sided foraminal stenosis. There is also a right  paraspinal mass and some infiltrative disease involving the L4  vertebral body.    L5-S1: Minimal posterior osteophyte formation and disc bulging is  present causing some mild to moderate bilateral neural foraminal  stenosis but no central canal stenosis.      Impression    IMPRESSION:  1. Focal bone marrow abnormalities most advanced at L4 where there is  complete involvement of the L4 vertebral body, right paraspinal mass,  and mild involvement of the left paraspinal tissues. Findings are most  likely due to metastatic disease. There is no evidence for any  epidural tumor.  2. Multilevel degenerative changes, most advanced at L4-L5 where there  is mild central canal stenosis, moderate right-sided foraminal  stenosis and mild left-sided foraminal stenosis.    MALENA AMAYA MD       Recent vital signs:   BP (!) 129/98   Pulse 71   Temp 96.8  F (36  C) (Oral)   Resp 20   Wt 104.3 kg (230 lb)   LMP 11/21/2020   SpO2 98%     [place] Coma Scale Score: 15 (12/05/20 1315)       Cardiac Rhythm: Other  Pt needs tele? No  Skin/wound Issues: None    Code Status: Full Code    Pain control: good    Nausea control: pt had none    Abnormal labs/tests/findings requiring intervention    Family present during ED course? No   Family Comments/Social Situation comments:     Tasks needing completion: None    Rory Agarwal, RN  [phone] [place] [facility]  [phone] [facility]  ED

## 2020-12-06 ENCOUNTER — APPOINTMENT (OUTPATIENT)
Dept: ULTRASOUND IMAGING | Facility: CLINIC | Age: 45
End: 2020-12-06
Attending: PHYSICIAN ASSISTANT
Payer: MEDICAID

## 2020-12-06 LAB
ANION GAP SERPL CALCULATED.3IONS-SCNC: 6 MMOL/L (ref 3–14)
APTT PPP: 29 SEC (ref 22–37)
AT III ACT/NOR PPP CHRO: 94 % (ref 85–135)
BASOPHILS # BLD AUTO: 0 10E9/L (ref 0–0.2)
BASOPHILS NFR BLD AUTO: 0.6 %
BUN SERPL-MCNC: 10 MG/DL (ref 7–30)
CALCIUM SERPL-MCNC: 9.1 MG/DL (ref 8.5–10.1)
CHLORIDE SERPL-SCNC: 108 MMOL/L (ref 94–109)
CO2 SERPL-SCNC: 25 MMOL/L (ref 20–32)
CREAT SERPL-MCNC: 0.81 MG/DL (ref 0.52–1.04)
CRP SERPL-MCNC: 51 MG/L (ref 0–8)
D DIMER PPP FEU-MCNC: 1.2 UG/ML FEU (ref 0–0.5)
DIFFERENTIAL METHOD BLD: ABNORMAL
EOSINOPHIL # BLD AUTO: 0.1 10E9/L (ref 0–0.7)
EOSINOPHIL NFR BLD AUTO: 1.9 %
ERYTHROCYTE [DISTWIDTH] IN BLOOD BY AUTOMATED COUNT: 13.7 % (ref 10–15)
ERYTHROCYTE [DISTWIDTH] IN BLOOD BY AUTOMATED COUNT: 13.7 % (ref 10–15)
FIBRINOGEN PPP-MCNC: 513 MG/DL (ref 200–420)
GFR SERPL CREATININE-BSD FRML MDRD: 87 ML/MIN/{1.73_M2}
GLUCOSE SERPL-MCNC: 89 MG/DL (ref 70–99)
HCT VFR BLD AUTO: 32.7 % (ref 35–47)
HCT VFR BLD AUTO: 34 % (ref 35–47)
HGB BLD-MCNC: 10.2 G/DL (ref 11.7–15.7)
HGB BLD-MCNC: 10.5 G/DL (ref 11.7–15.7)
IL6 SERPL-MCNC: 17.38 PG/ML
IMM GRANULOCYTES # BLD: 0 10E9/L (ref 0–0.4)
IMM GRANULOCYTES NFR BLD: 0.3 %
INR PPP: 1.11 (ref 0.86–1.14)
LDH SERPL L TO P-CCNC: 145 U/L (ref 81–234)
LYMPHOCYTES # BLD AUTO: 2.5 10E9/L (ref 0.8–5.3)
LYMPHOCYTES NFR BLD AUTO: 40.1 %
MCH RBC QN AUTO: 27.5 PG (ref 26.5–33)
MCH RBC QN AUTO: 28 PG (ref 26.5–33)
MCHC RBC AUTO-ENTMCNC: 30.9 G/DL (ref 31.5–36.5)
MCHC RBC AUTO-ENTMCNC: 31.2 G/DL (ref 31.5–36.5)
MCV RBC AUTO: 88 FL (ref 78–100)
MCV RBC AUTO: 91 FL (ref 78–100)
MONOCYTES # BLD AUTO: 0.6 10E9/L (ref 0–1.3)
MONOCYTES NFR BLD AUTO: 9.9 %
NEUTROPHILS # BLD AUTO: 2.9 10E9/L (ref 1.6–8.3)
NEUTROPHILS NFR BLD AUTO: 47.2 %
NRBC # BLD AUTO: 0 10*3/UL
NRBC BLD AUTO-RTO: 0 /100
PLATELET # BLD AUTO: 199 10E9/L (ref 150–450)
PLATELET # BLD AUTO: 203 10E9/L (ref 150–450)
POTASSIUM SERPL-SCNC: 4.2 MMOL/L (ref 3.4–5.3)
RBC # BLD AUTO: 3.71 10E12/L (ref 3.8–5.2)
RBC # BLD AUTO: 3.75 10E12/L (ref 3.8–5.2)
RETICS # AUTO: 61.9 10E9/L (ref 25–95)
RETICS/RBC NFR AUTO: 1.7 % (ref 0.5–2)
SODIUM SERPL-SCNC: 139 MMOL/L (ref 133–144)
TROPONIN I SERPL-MCNC: <0.015 UG/L (ref 0–0.04)
UFH PPP CHRO-ACNC: 1.05 IU/ML
WBC # BLD AUTO: 6.2 10E9/L (ref 4–11)
WBC # BLD AUTO: 6.2 10E9/L (ref 4–11)

## 2020-12-06 PROCEDURE — 120N000002 HC R&B MED SURG/OB UMMC

## 2020-12-06 PROCEDURE — 999N000128 HC STATISTIC PERIPHERAL IV START W/O US GUIDANCE

## 2020-12-06 PROCEDURE — 83615 LACTATE (LD) (LDH) ENZYME: CPT | Performed by: PHYSICIAN ASSISTANT

## 2020-12-06 PROCEDURE — 99222 1ST HOSP IP/OBS MODERATE 55: CPT | Performed by: NURSE PRACTITIONER

## 2020-12-06 PROCEDURE — 85610 PROTHROMBIN TIME: CPT | Performed by: PHYSICIAN ASSISTANT

## 2020-12-06 PROCEDURE — 85379 FIBRIN DEGRADATION QUANT: CPT | Performed by: PHYSICIAN ASSISTANT

## 2020-12-06 PROCEDURE — 85520 HEPARIN ASSAY: CPT | Performed by: INTERNAL MEDICINE

## 2020-12-06 PROCEDURE — 84484 ASSAY OF TROPONIN QUANT: CPT | Performed by: PHYSICIAN ASSISTANT

## 2020-12-06 PROCEDURE — 85045 AUTOMATED RETICULOCYTE COUNT: CPT | Performed by: PHYSICIAN ASSISTANT

## 2020-12-06 PROCEDURE — 250N000013 HC RX MED GY IP 250 OP 250 PS 637: Performed by: NURSE PRACTITIONER

## 2020-12-06 PROCEDURE — 93970 EXTREMITY STUDY: CPT | Mod: 26 | Performed by: RADIOLOGY

## 2020-12-06 PROCEDURE — 85384 FIBRINOGEN ACTIVITY: CPT | Performed by: PHYSICIAN ASSISTANT

## 2020-12-06 PROCEDURE — 93970 EXTREMITY STUDY: CPT

## 2020-12-06 PROCEDURE — 86140 C-REACTIVE PROTEIN: CPT | Performed by: PHYSICIAN ASSISTANT

## 2020-12-06 PROCEDURE — 85730 THROMBOPLASTIN TIME PARTIAL: CPT | Performed by: INTERNAL MEDICINE

## 2020-12-06 PROCEDURE — 250N000013 HC RX MED GY IP 250 OP 250 PS 637: Performed by: PHYSICIAN ASSISTANT

## 2020-12-06 PROCEDURE — 36415 COLL VENOUS BLD VENIPUNCTURE: CPT | Performed by: INTERNAL MEDICINE

## 2020-12-06 PROCEDURE — 99233 SBSQ HOSP IP/OBS HIGH 50: CPT | Mod: GC | Performed by: SURGERY

## 2020-12-06 PROCEDURE — 250N000011 HC RX IP 250 OP 636: Performed by: INTERNAL MEDICINE

## 2020-12-06 PROCEDURE — 250N000013 HC RX MED GY IP 250 OP 250 PS 637: Performed by: INTERNAL MEDICINE

## 2020-12-06 PROCEDURE — 250N000013 HC RX MED GY IP 250 OP 250 PS 637: Performed by: HOSPITALIST

## 2020-12-06 PROCEDURE — 85300 ANTITHROMBIN III ACTIVITY: CPT | Performed by: PHYSICIAN ASSISTANT

## 2020-12-06 PROCEDURE — 99233 SBSQ HOSP IP/OBS HIGH 50: CPT | Performed by: INTERNAL MEDICINE

## 2020-12-06 PROCEDURE — 36415 COLL VENOUS BLD VENIPUNCTURE: CPT | Performed by: PHYSICIAN ASSISTANT

## 2020-12-06 PROCEDURE — 80048 BASIC METABOLIC PNL TOTAL CA: CPT | Performed by: PHYSICIAN ASSISTANT

## 2020-12-06 PROCEDURE — 85027 COMPLETE CBC AUTOMATED: CPT | Performed by: INTERNAL MEDICINE

## 2020-12-06 PROCEDURE — 85025 COMPLETE CBC W/AUTO DIFF WBC: CPT | Performed by: PHYSICIAN ASSISTANT

## 2020-12-06 PROCEDURE — 250N000011 HC RX IP 250 OP 636: Performed by: PHYSICIAN ASSISTANT

## 2020-12-06 RX ORDER — IBUPROFEN 200 MG
200 TABLET ORAL EVERY 4 HOURS PRN
Status: ON HOLD | COMMUNITY
End: 2020-12-10

## 2020-12-06 RX ORDER — ACETAMINOPHEN 325 MG/1
325-650 TABLET ORAL EVERY 6 HOURS PRN
Status: ON HOLD | COMMUNITY
End: 2020-12-10

## 2020-12-06 RX ORDER — HEPARIN SODIUM 10000 [USP'U]/100ML
0-5000 INJECTION, SOLUTION INTRAVENOUS CONTINUOUS
Status: DISCONTINUED | OUTPATIENT
Start: 2020-12-06 | End: 2020-12-09

## 2020-12-06 RX ORDER — ONDANSETRON 2 MG/ML
4 INJECTION INTRAMUSCULAR; INTRAVENOUS EVERY 6 HOURS PRN
Status: DISCONTINUED | OUTPATIENT
Start: 2020-12-06 | End: 2020-12-10 | Stop reason: HOSPADM

## 2020-12-06 RX ORDER — QUETIAPINE FUMARATE 100 MG/1
100 TABLET, FILM COATED ORAL AT BEDTIME
Status: DISCONTINUED | OUTPATIENT
Start: 2020-12-06 | End: 2020-12-10 | Stop reason: HOSPADM

## 2020-12-06 RX ORDER — QUETIAPINE FUMARATE 25 MG/1
25 TABLET, FILM COATED ORAL 2 TIMES DAILY PRN
Status: DISCONTINUED | OUTPATIENT
Start: 2020-12-06 | End: 2020-12-10 | Stop reason: HOSPADM

## 2020-12-06 RX ORDER — LANOLIN ALCOHOL/MO/W.PET/CERES
3 CREAM (GRAM) TOPICAL
Status: DISCONTINUED | OUTPATIENT
Start: 2020-12-06 | End: 2020-12-10 | Stop reason: HOSPADM

## 2020-12-06 RX ORDER — METHOCARBAMOL 500 MG/1
500 TABLET, FILM COATED ORAL 4 TIMES DAILY
Status: DISCONTINUED | OUTPATIENT
Start: 2020-12-06 | End: 2020-12-10 | Stop reason: HOSPADM

## 2020-12-06 RX ADMIN — METHOCARBAMOL 500 MG: 500 TABLET, FILM COATED ORAL at 20:55

## 2020-12-06 RX ADMIN — HYDROMORPHONE HYDROCHLORIDE 0.5 MG: 1 INJECTION, SOLUTION INTRAMUSCULAR; INTRAVENOUS; SUBCUTANEOUS at 11:31

## 2020-12-06 RX ADMIN — MELATONIN TAB 3 MG 3 MG: 3 TAB at 00:17

## 2020-12-06 RX ADMIN — HEPARIN SODIUM 1800 UNITS/HR: 10000 INJECTION, SOLUTION INTRAVENOUS at 15:00

## 2020-12-06 RX ADMIN — HYDROMORPHONE HYDROCHLORIDE 0.5 MG: 1 INJECTION, SOLUTION INTRAMUSCULAR; INTRAVENOUS; SUBCUTANEOUS at 00:17

## 2020-12-06 RX ADMIN — DOCUSATE SODIUM AND SENNOSIDES 1 TABLET: 8.6; 5 TABLET ORAL at 09:15

## 2020-12-06 RX ADMIN — DOCUSATE SODIUM AND SENNOSIDES 2 TABLET: 8.6; 5 TABLET ORAL at 20:56

## 2020-12-06 RX ADMIN — ACETAMINOPHEN 325 MG: 325 TABLET, FILM COATED ORAL at 20:54

## 2020-12-06 RX ADMIN — SERTRALINE HYDROCHLORIDE 50 MG: 50 TABLET ORAL at 15:53

## 2020-12-06 RX ADMIN — OXYCODONE HYDROCHLORIDE 10 MG: 5 TABLET ORAL at 02:38

## 2020-12-06 RX ADMIN — ACETAMINOPHEN 325 MG: 325 TABLET, FILM COATED ORAL at 02:38

## 2020-12-06 RX ADMIN — NICOTINE 1 PATCH: 14 PATCH, EXTENDED RELEASE TRANSDERMAL at 14:57

## 2020-12-06 RX ADMIN — MELATONIN TAB 3 MG 3 MG: 3 TAB at 22:06

## 2020-12-06 RX ADMIN — OXYCODONE HYDROCHLORIDE 10 MG: 5 TABLET ORAL at 09:14

## 2020-12-06 RX ADMIN — ACETAMINOPHEN 325 MG: 325 TABLET, FILM COATED ORAL at 09:14

## 2020-12-06 RX ADMIN — QUETIAPINE FUMARATE 100 MG: 100 TABLET ORAL at 21:03

## 2020-12-06 RX ADMIN — METHOCARBAMOL 500 MG: 500 TABLET, FILM COATED ORAL at 15:53

## 2020-12-06 RX ADMIN — HYDROMORPHONE HYDROCHLORIDE 0.5 MG: 1 INJECTION, SOLUTION INTRAMUSCULAR; INTRAVENOUS; SUBCUTANEOUS at 20:54

## 2020-12-06 RX ADMIN — OXYCODONE HYDROCHLORIDE 10 MG: 5 TABLET ORAL at 20:54

## 2020-12-06 RX ADMIN — OXYCODONE HYDROCHLORIDE 10 MG: 5 TABLET ORAL at 14:57

## 2020-12-06 ASSESSMENT — ACTIVITIES OF DAILY LIVING (ADL)
ADLS_ACUITY_SCORE: 15
ADLS_ACUITY_SCORE: 16
ADLS_ACUITY_SCORE: 16

## 2020-12-06 ASSESSMENT — MIFFLIN-ST. JEOR: SCORE: 1857

## 2020-12-06 NOTE — H&P
[facility]     Hospitalist History and Physical       Date of Admission:  12/5/2020  Assessment & Plan   Teresa Sanderson is a 45 year old female with pyleonephritis, DVT, left breast mass who was admitted to [facility]  on 12/5/2020 for evaluation of  severe L>R back pain, hematuria, loss of appetiteand found to be COVID-19 positive.    COVID-19 Diagnosis Details:  Onset of COVID symptoms N/a remains asymptomatic   Date of positive test results 12/5/20   Research study Pending review     # Confirmed COVID-19 infection, asymptomatic  - Initial CT abdomen and pelvis in ED showed positive findings of GGO bilaterally, CXR is pending. Oxygen needs have been stable (not on supplemental O2).   - Admit to medical floor with continuous pulse oximetry, COVID-19 special precautions, minimized lab draws, and care consolidation  - Oxygen: continue current support with no supplemental O2 needs; titrate to keep SpO2 between 90-96%  - Labs: CBC with differential, CMP, reticulocyte count, PT/INR, D-dimer, CPK, ferritin, LDH, troponin, CRP and procalcitonin done on admission and reviewed by me. Will trend daily until patient reaches clinical stability.  - Imaging: images for malignancy workup: CXR, brain MRI, CT CAP w/ and w/o contrast, LE duplex  - Breathing treatments: no inhalers needed; avoid nebulizers in favor of MDIs   - IV fluids: not indicated at this time  - Antibiotics: not indicated   - Treatments: No COVID-specific therapies are appropriate at this time.  - Research study protocol: pending review  - DVT Prophylaxis: At high risk of thrombotic complications due to COVID-19 disease (last DDimer displayed below).          - PROPHYLACTIC dosing: lovenox 40mg daily --holding given plan for biopsy   - Discharge Anticoagulation: At discharge consider one of the following for 30 days and until the patient has returned to normal  mobility:        - Apixaban (Eliquis) 2.5 mg two times a day        - Rivaroxaban (Xarelto) 10 mg once daily    No results found for: DD    ACTIVE HOSPITAL PROBLEMS:  Breast mass concerning for malignancy with e/o metastatic disease on imaging  On exam ~3cm left upper outter quadrant mass that is somewhat fixed, smooth and round, no discharge or skin changes, no nipple discharge, tender lump of anterior axilla ~2cm. US breast 10/25/20 did not identify mass or abscess.  She reportedly had mammogram x 2 at [facility] showing benign breast mass. Given in context of multiple organ and bony lesions, left leg weakness, marked weight loss is highly suggestive of malignancy  -  MRI brain    - CT CAP with and without contrast to assess for further lesions  - CXR now  - consult to IR for biopsy left breast lesion  - consult to radiation oncology once images   - consider consult to neurosurgery if lesions isolated to L-spine  - follow up pathology and consult to oncology or appropriate respective onc team  - NPO p midnight pending biopsy  - continue to offer emotional support    Acute back pain (secondary to lytic spinal lesion L4 and L5 and pelvis)  Occurs in the setting of above and in the setting of hematuria. CT showing lytic lesions in L4-L5 and in the pelvis concerning for metastatic disease. MRI lumbar spine w/o contrast shows complete involvement of bone marrow abnormality in L4 and several focal abnormalities c/w mets, mild central canal stenosis, moderate right-sided foraminal stenosis and mild left-sided foraminal stenosis.   - see also above workup of breast mass  - pain mng: APAP prn, oxycodone 5-10mg q3h prn and dilaudid 0.5mg q3h prn, increase if insufficient pain control    History of pyelonephritis  Hx of gross hematuria  UA in ED with mucous, 15 WBC, 1 RBC, + 10 albumin, 3 squams. CT A/P w/o contrast with right lower pole heterogeneous lesion. Hematuria x 3 days in a.m. and then urine clears, + bilateral low  back and CVA tenderness in the setting of above.    - urine cytology   - follow up CT C/A/P w/ and w/o contrast  - discontinued ketorlac given plans for contrast imaging  - follow up urine culture  - trend BMP      Chronic, stable Medical Problems:  History of a DVT and PE  - holding lovenox given plan for biopsy, resume when able    Tobacco use disorder  - nicotine patch panel ordered       Code Status: Full Code*  Goals of Care/ACP:  Teresa expressed these wishes if she is unable to communicate  Health care agent self   Short term intubation yes   Prolonged intubation not asked   Tracheostomy not asked   Nasal feeding tube not asked   Gastric feeding tube not asked   Other wishes None      Diet: Regular Diet Adult  NPO for Medical/Clinical Reasons Except for: Meds*regular  Crisostomo Catheter: not present  Bedside iPad: NOT used due to severity of illness/medical appropriateness   Disposition Plan   Expected discharge: > 7 days, recommended to likely to home pending interval progress once inpatient procedures and workup are completed.  Entered: Emi Irby PA-C 12/05/2020, 7:38 PM       The patient's care was discussed with the Attending Physician, Dr. Miranda, Bedside Nurse and Patient.    Emi Irby PA-C  [facility]   Contact information available via [facility] Paging/Directory  Please see sign in/sign out for up to date coverage information  ______________________________________________________________________    Chief Complaint   Acute back pain, hematuria    History is obtained from the patient    History of Present Illness   Teresa Sanderson is a 45 year old female with pyleonephritis, DVT, left breast mass who was admitted to [facility]  on 12/5/2020 for evaluation of  severe L>R back pain, hematuria, loss of appetiteand found to be COVID-19 positive.    She initially presented to ED 10/25/20 with an itchy and  "painful left breast lump x 2-3 months that was \"leaking\" with tenderness in the left axilla. During that visit, she reported a benign prior mammogram in [facility] in 11/2019. She returns to ED for pain in her back and hematuria.     Her breast lump was noted to be bothersome ~5 months ago and was apparent on prior mammography x 2 at [facility] in [place] and patient ws told this was c/w a benign breast mass. She notes pain in her axilla.     Over the past 1 year she has needed to lift her left leg with her hands to pick it up and has had Xray that was c/w DJD.  She had been self managing with ibuprofen and needed to escalate her dose over the last several days and reports that recently it has not helped at all with her low back pain that is over the spine and bilateral lower back that is spastic in nature. No urinary or bowel incontinence or saddle anesthesias. She finds that in the morning when she gets OOB she needs to bend over for a prolonged period of time in order to get up and alleviate pain.     She has lost ~40lb over the past 7 months and has low appetite and will usually only eat \"supper\" and has attributed her low appetite to stress.     Over the past 3 day she is seeing painless BRB in the first urine of the day throughout the stream that clears in the following urines and has low urine output in general recently. Denies dysuria or incontinence.     She notes she has a psychiatric history that includes schizoaffective d/o, depression and anxiety.    She has recently moved to [place] from [place] about 7 months ago to help her daughter and grandchildren who live locally.     Review of Systems    The 10 point Review of Systems is negative other than noted in the HPI or here.     Past Medical History    I have reviewed this patient's medical history and updated it with pertinent information if needed.   Past Medical History:   Diagnosis Date     Anxiety      Depression      DVT (deep venous thrombosis) (H) " 2014     Left breast mass     x approximately 4-5 months     Pulmonary emboli (H)      Pyelonephritis      Schizoaffective disorder (H)      Tobacco use        Past Surgical History   I have reviewed this patient's surgical history and updated it with pertinent information if needed.  Past Surgical History:   Procedure Laterality Date     TUBAL LIGATION  1998       Social History   I have reviewed this patient's social history and updated it with pertinent information if needed.  Social History     Tobacco Use     Smoking status: Current Every Day Smoker     Packs/day: 0.50     Types: Cigarettes     Smokeless tobacco: Never Used   Substance Use Topics     Alcohol use: Not Currently     Comment: occ     Drug use: Yes     Types: Marijuana     Comment: occ       Family History   I have reviewed this patient's family history and updated it with pertinent information if needed.   Family History   Problem Relation Age of Onset     Lung Cancer Mother      Lung Cancer Father      Lupus Brother      Hypertension Other      Diabetes Other        Prior to Admission Medications   None     Allergies   No Known Allergies    Physical Exam   Vital Signs: Temp: 96.6  F (35.9  C) Temp src: Oral BP: 136/78 Pulse: 60   Resp: 16 SpO2: 99 % O2 Device: None (Room air)    Weight: 230 lbs 0 oz  GENERAL: Alert and oriented x 3. NAD. Able to sit upright without assistance. Pleasant. Cooperative.   HEENT: Anicteric sclera. Mucous membranes moist. NC. AT. EOMI. PERRL  CV: RRR. S1, S2. No murmurs appreciated.   RESPIRATORY: Effort normal on RA. Lungs CTAB with no wheezing, rales, rhonchi.   BREAST:  ~3cm left upper outter quadrant mass that is somewhat fixed, smooth and round, no discharge or skin changes, no nipple discharge, tender lump of anterior axilla ~2cm.  GI: + LUQ tenderness to deep palpation that appears to be over the rib, Abdomen obese,, soft and non distended with normoactive bowel sounds present in all quadrants. No other  tenderness, rebound, guarding. No lesions.   NEUROLOGICAL: + inability to extend the left hip independently and tenderness with passive extension of the left hip. No other focal deficits. Moves all extremities.  CN 2-12 grossly intact.  EXTREMITIES: No peripheral edema. Intact bilateral pedal pulses.   SKIN: No jaundice. No rashes.  BACK: + marked tenderness of the lumbar spine at approx L4 level and bilateral mild CVA tenderness      Data   Data reviewed today: I reviewed all medications, new labs and imaging results over the last 24 hours. I personally reviewed no images or EKG's today.    Recent Labs   Lab 12/05/20  0934   WBC 7.1   HGB 12.1   MCV 88         POTASSIUM 4.6   CHLORIDE 109   CO2 27   BUN 7   CR 0.59   ANIONGAP 3   NANDO 8.8   *   ALBUMIN 3.3*   PROTTOTAL 8.2   BILITOTAL 0.5   ALKPHOS 127   ALT 18   AST 21
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
## 2020-12-06 NOTE — PHARMACY-ADMISSION MEDICATION HISTORY
Pharmacy Admission Medication History    Admission medication history interview status for the 12/5/2020 admission is complete. See EPIC admission navigator for allergy information, prior to admission medications and immunization status.     Medication history interview source(s): Patient    Medication history resources (including written lists, pill bottles, clinic record): None    Medication history source reliability: Good    Pneumococcal and influenza vaccine history documented: no    Actions taken by pharmacist (provider contacted, medication changes, etc):None     Changes made to medication history:    Added to medication list: Ibuprofen and Acetaminophen    Additional medication history information: None    Medication reconciliation/reorder completed by provider prior to medication history? Yes    Time spent in this activity: 15 minutes    Prior to Admission medications    Medication Sig Last Dose Taking? Auth Provider   acetaminophen (TYLENOL) 325 MG tablet Take 325-650 mg by mouth every 6 hours as needed for mild pain 12/5/2020 at Unknown time Yes Unknown, Entered By History   ibuprofen (ADVIL/MOTRIN) 200 MG tablet Take 200 mg by mouth every 4 hours as needed for mild pain 12/5/2020 at Unknown time Yes Unknown, Entered By History

## 2020-12-06 NOTE — PLAN OF CARE
Cares 1900 to 0700    /51 (BP Location: Left arm)   Pulse 68   Temp 96.8  F (36  C) (Oral)   Resp 16   Ht 1.829 m (6')   Wt 104.4 kg (230 lb 2.6 oz)   LMP 11/21/2020   SpO2 98%   BMI 31.22 kg/m      Pain: Constant lower back pain & intermittent pain in LLE. Pain management include heat pack, oxycodone 10mg q3h, tylenol q4h, & IV dilaudid q3h.   Neuros: A&o x4. Cooperative. Grateful.   Cardiac: WNL. Denies chest pain.   Resp: LS clear, no SOB, on room air.   GI/: Voids spontaneously, china urine. Pt states no BM for >5 days, senna & miralax given.   Nutrition: NPO since midnight.    IV/Drains: L PIV SL.   Skin: Intact except for L breast mass, no drainage or open sores.   Activity: SBA. LLE difficult to bend at knee, tender.   Labs: Pending    Events this shift:   Pt stated that her pain level is lower than it was when she got admitted, but her back pain continues to bother her. Pt also states that she has high tolerance for pain since she has been living with the pain. Urine culture collected.      POC: MRI & biopsy scheduled today.

## 2020-12-06 NOTE — PROGRESS NOTES
Reason for admission: Hematuria, Breast mass metastasis   Admitted from: ED  Report received from: RICHARD Holden  2 RN skin assessment completed by: Dinora THAYER RN & Vinny FERNANDEZ RN  Bed Algorithm reevaluated: Yes  Was Pulsate ordered?: No  Belongings:   Black jacket, purple sweater, and black phone with white .

## 2020-12-06 NOTE — CONSULTS
INTERVENTIONAL RADIOLOGY CONSULT NOTE    Reason for referral:   Consideration of left breast mass biopsy    History:   Admitted  for back pain and imaging findings concerning for left breast mass and lytic oseous lesions in the lumbar and thoracic spine. Incidentally found to have positive Covid-19 nasopharyngeal swab, asymptomatic.     Labs:  Lab Results   Component Value Date    HGB 10.5 2020     Lab Results   Component Value Date     2020     Lab Results   Component Value Date    WBC 6.2 2020       Lab Results   Component Value Date    INR 1.11 2020       Lab Results   Component Value Date    PROTTOTAL 8.2 2020      Lab Results   Component Value Date    ALBUMIN 3.3 2020     Lab Results   Component Value Date    BILITOTAL 0.5 2020     No results found for: BILICONJ   Lab Results   Component Value Date    ALKPHOS 127 2020     Lab Results   Component Value Date    AST 21 2020     Lab Results   Component Value Date    ALT 18 2020       Lab Results   Component Value Date    CR 0.81 2020     Lab Results   Component Value Date    BUN 10 2020       Imaging:   CT C/A/P and MR lumbar     Assessment:   1. Left breast mass, presumed spinal metastases   2. Covid+    Plan:   1. Breast mass needs to be worked up at the outpatient breast center, which would likely include mammography and/or US evaluation prior to biopsy by breast specialists. Please make outpatient appointment with [facility] ([place]: [phone]) or [facility] (central [phone]). Please include symptoms (lump, pain, nipple discharge, etc.), laterality, breast quadrant. Place Epic order for Bilateral Diagnostic Mammogram and Bilateral Breast Ultrasound. Additionally, please make follow-up appointment with primary care.   2. Biopsy of presumed spinal metastases would be performed by diagnostic neuroradiology.     Discussed with   Ana.    Jonah Corrales MD  Radiology Resident   IR pass pager: [phone]

## 2020-12-06 NOTE — PROGRESS NOTES
"SPIRITUAL HEALTH SERVICES: Tele-Encounter  Patient Location ([facility], [facility], Unit): [facility], [place], 5A  Spoke with (patient, family relationship): PtAna Moore      Referral Source: [facility] consult for hospital .    If applicable: patient was appropriately screened for telechaplaincy support with bedside nurse prior to visit (e.g. Mental Health and Addiction contexts). See call details below.    DATA:  Pt Teresa identified worries around, \"I have a mass in my back and breast and I'm worried it could be cancer\".     Teresa stated that this is a cause of much anxiousness for her. She cope with this worry through prayers and stated that, \"I just wanna be there for me kids and guide them aright\".     Teresa welcomed prayers from me around these circumstances and we prayed together at her request.     Teresa also welcomes follow up from [facility] through the unit .        PLAN:  Unit  will be notified for follow up. [facility] is available to Teresa per request.    OBI Goode    ______________________________    Type of service:  Telephone Visit     has received verbal consent for a TelephoneVisit from the patient? Yes    Distance Provider Location: designated [place] office or home office (secure setting)    Mode of Communication: telephone (via [place] phone or [place] tele-call-number ([phone]))      "

## 2020-12-06 NOTE — PROGRESS NOTES
[facility]     Medicine Progress Note - Hospitalist Service, Gold 8       Date of Admission:  12/5/2020  Assessment & Plan       Teresa Sanderson is a 45 year old female with pyleonephritis, DVT, left breast mass who was admitted to [facility]  on 12/5/2020 for evaluation of  severe L>R back pain, hematuria, loss of appetiteand found to be COVID-19 positive.    ACTIVE HOSPITAL PROBLEMS:  Breast mass concerning for malignancy with e/o metastatic disease on imaging  On exam ~3cm left upper outter quadrant mass that is somewhat fixed, smooth and round, no discharge or skin changes, no nipple discharge, tender lump of anterior axilla ~2cm. US breast 10/25/20 did not identify mass or abscess.  She reportedly had mammogram x 2 at [facility] showing benign breast mass. Given in context of multiple organ and bony lesions, left leg weakness, marked weight loss is highly suggestive of malignancy  -  MRI brain, - CT CAP with and without contrast reviewed  -  IR consult reviewed for biopsy left breast lesion, D/W Gen Sx as well.    - D/W neurosurgery consult   - follow up pathology and consult to oncology or appropriate respective onc team  - continue to offer emotional support     Acute back pain (secondary to lytic spinal lesion L4 and L5 and pelvis)  Occurs in the setting of above and in the setting of hematuria. CT showing lytic lesions in L4-L5 and in the pelvis concerning for metastatic disease. MRI lumbar spine w/o contrast shows complete involvement of bone marrow abnormality in L4 and several focal abnormalities c/w mets, mild central canal stenosis, moderate right-sided foraminal stenosis and mild left-sided foraminal stenosis.   - see also above workup of breast mass  - pain mng: APAP prn, oxycodone 5-10mg q3h prn and dilaudid 0.5mg q3h prn, increase if insufficient pain control. Add Robaxin and minimize opioids  - PT/OT       Left popliteal vein DVT:   - will start on Heparin gtt pending biopsy planning    History of pyelonephritis  Hx of gross hematuria  UA in ED with mucous, 15 WBC, 1 RBC, + 10 albumin, 3 squams. CT A/P w/o contrast with right lower pole heterogeneous lesion. Hematuria x 3 days in a.m. and then urine clears, + bilateral low back and CVA tenderness in the setting of above.    - urine cytology   - discontinued ketorlac given plans for contrast imaging  - follow up urine culture  - trend BMP         COVID-19 Diagnosis Details:  Onset of COVID symptoms N/a remains asymptomatic   Date of positive test results 12/5/20   Research study Pending review      # Confirmed COVID-19 infection, asymptomatic  - Initial CT abdomen and pelvis in ED showed positive findings of GGO bilaterally, CXR is pending. Oxygen needs have been stable (not on supplemental O2).   - Admit to medical floor with continuous pulse oximetry, COVID-19 special precautions, minimized lab draws, and care consolidation  - Oxygen: continue current support with no supplemental O2 needs; titrate to keep SpO2 between 90-96%  - Labs: CBC with differential, CMP, reticulocyte count, PT/INR, D-dimer, CPK, ferritin, LDH, troponin, CRP and procalcitonin done on admission and reviewed by me. Will trend daily until patient reaches clinical stability.  - Imaging: images for malignancy workup: CXR, brain MRI, CT CAP w/ and w/o contrast, LE duplex  - Breathing treatments: no inhalers needed; avoid nebulizers in favor of MDIs   - IV fluids: not indicated at this time  - Antibiotics: not indicated   - Treatments: No COVID-specific therapies are appropriate at this time.  - Research study protocol: pending review  - DVT Prophylaxis: At high risk of thrombotic complications due to COVID-19 disease (last DDimer displayed below).          - PROPHYLACTIC dosing: lovenox 40mg daily --holding given plan for biopsy   - Discharge Anticoagulation: At discharge consider one of the  following for 30 days and until the patient has returned to normal mobility:        - Apixaban (Eliquis) 2.5 mg two times a day        - Rivaroxaban (Xarelto) 10 mg once daily     Chronic, stable Medical Problems:  History of a DVT and PE  - holding lovenox given plan for biopsy, resume when able     Tobacco use disorder  - nicotine patch panel ordered     Crisostomo Catheter: not present  Bedside iPad: NOT used due to severity of illness/medical appropriateness       Diet: Regular Diet Adult    DVT Prophylaxis: heparin  Crisostomo Catheter: not present  Code Status: Full Code         Disposition Plan   Expected discharge: 2 - 3 days, recommended to prior living arrangement once adequate pain management/ tolerating PO medications and anticoagulatin plan.  Entered: Perry Degroot MD, MD 12/06/2020, 1:55 PM     The patient's care was discussed with the Bedside Nurse, Care Coordinator/, Patient, Gen surgery, Neuro Rad Consultant and Neurosurgery Team.    Perry Degroot MD, MD  Hospitalist Service, [address]   Contact information available via [facility] Paging/Directory  Please see sign in/sign out for up to date coverage information  ______________________________________________________________________    Interval History   History reviewed  Worsening LBP for last few days with radiation to left thigh. No numbness. No incontinence  Denies Chest pain/ SOB/ cough, Denies any N&V/ bowel problems  Denies urinary problems , Denies fever/chills/rigors     Data reviewed today: I reviewed all medications, new labs and imaging results over the last 24 hours. I personally reviewed CT/MRI as noted.    Physical Exam   /51 (BP Location: Left arm)   Pulse 68   Temp 96.8  F (36  C) (Oral)   Resp 16   Ht 1.829 m (6')   Wt 104.4 kg (230 lb 2.6 oz)   LMP 11/21/2020   SpO2 98%   BMI 31.22 kg/m    Gen- pleasant lying in bed  HEENT- NAD, FAWN  Neck- supple, no JVD  elevation, no thyromegaly  CVS- I+II+ no m/r/g  RS- CTAB  Abdo- soft, no tenderness . No g/r/r   Ext- no edema   CNS- oriented to person/ place/ time     Left breast mass approx 3 x 3 cm      Data   BMP  Recent Labs   Lab 12/06/20  0703 12/05/20  0934    139   POTASSIUM 4.2 4.6   CHLORIDE 108 109   NANDO 9.1 8.8   CO2 25 27   BUN 10 7   CR 0.81 0.59   GLC 89 107*     CBC  Recent Labs   Lab 12/06/20  0703 12/05/20  0934   WBC 6.2 7.1   RBC 3.75* 4.32   HGB 10.5* 12.1   HCT 34.0* 38.1   MCV 91 88   MCH 28.0 28.0   MCHC 30.9* 31.8   RDW 13.7 13.7    229     INR  Recent Labs   Lab 12/06/20  0703 12/05/20  1921   INR 1.11 1.13     LFTs  Recent Labs   Lab 12/05/20  0934   ALKPHOS 127   AST 21   ALT 18   BILITOTAL 0.5   PROTTOTAL 8.2   ALBUMIN 3.3*      PANCNo lab results found in last 7 days.

## 2020-12-06 NOTE — PLAN OF CARE
On special precautions for positive Covid-19.Alert and oriented x 4. Vital signs stable. On room air. Lungs clear.Back pain managed with oral and iv pain medications. Ambulates to bathroom with stand by assist. Voids china urine. No BM. NPO. Iv saline locked. Rests and  and performs Incentive Spirometer.Plan: continue care. MRI this afternoon.

## 2020-12-06 NOTE — CONSULTS
[facility] Surgery Consultation    Teresa Sanderson MRN# [id number]   Age: 45 year old YOB: 1975     Date of Admission:  12/5/2020    Date of Consult:   12/05/20    Reason for consult: Biopsy of left breast mass       Requesting service: Gold 8; requesting provider: Perry Degroot MD                   Assessment and Plan:      45F admitted with lower back pain, hematuria, and a left breast mass with concern for metastasis to spine given lytic lesions of T7 and L4 vertebral bodies. Patient was found to be COVID positive on admission. Primary team is requesting a left breast biopsy to further dictate treatment regarding possible spinal metastases. Typically this would be worked up outpatient if no concern for infection or necrosis. If tissue biopsy is needed this admission, would recommend core needle biopsy by IR rather than surgical biopsy.      Patient discussed with chief resident, Dr. uAstin, and staff, Dr. Sparks.    Obdulio Corona MD (PGY-3)  General Surgery Resident            Chief Complaint:     Left breast mass         History of Present Illness:     45F admitted with history of pyelonephritis, DVT (not on AC), depression, anxiety, and a left breast mass who presented to [facility] on 12/5/2020 for severe L > R back pain, hematuria, and loss of appetite. She was incidentally found to be COVID positive. Patient was admitted to the medicine service and workup revealed lytic lesions of T7 and L4 vertebral bodies, concerning for breast metastases. She has had a 45 lb weight loss since July (over the last 5 months). The mass has been present for over one year. She previously lived in [place] and had mammograms x2 and was told that the mass was benign.          Past Medical History:     Past Medical History:   Diagnosis Date     Anxiety      Depression      DVT (deep venous thrombosis) (H) 2014     Left breast mass     x approximately 4-5 months     Pulmonary emboli (H)      Pyelonephritis       Schizoaffective disorder (H)      Tobacco use              Past Surgical History:     Past Surgical History:   Procedure Laterality Date     TUBAL LIGATION  1998             Social History:     Social History     Tobacco Use     Smoking status: Current Every Day Smoker     Packs/day: 0.50     Types: Cigarettes     Smokeless tobacco: Never Used   Substance Use Topics     Alcohol use: Not Currently     Comment: occ             Family History:     Family History   Problem Relation Age of Onset     Lung Cancer Mother      Lung Cancer Father      Lupus Brother      Hypertension Other      Diabetes Other               Allergies:   No Known Allergies          Medications:     Current Facility-Administered Medications   Medication     acetaminophen (TYLENOL) tablet 325 mg     bisacodyl (DULCOLAX) Suppository 10 mg     [Held by provider] enoxaparin ANTICOAGULANT (LOVENOX) injection 40 mg     HYDROmorphone (PF) (DILAUDID) injection 0.5 mg     lidocaine (LMX4) cream     lidocaine 1 % 0.1-1 mL     Medication instructions: Do NOT use nebulized medications     melatonin tablet 3 mg     naloxone (NARCAN) injection 0.2 mg    Or     naloxone (NARCAN) injection 0.4 mg    Or     naloxone (NARCAN) injection 0.2 mg    Or     naloxone (NARCAN) injection 0.4 mg     nicotine (NICODERM CQ) 14 MG/24HR 24 hr patch 1 patch     nicotine Patch in Place     ondansetron (ZOFRAN-ODT) ODT tab 4 mg    Or     ondansetron (ZOFRAN) injection 4 mg     oxyCODONE (ROXICODONE) tablet 5-10 mg     polyethylene glycol (MIRALAX) Packet 17 g     polyethylene glycol (MIRALAX) Packet 17 g     senna-docusate (SENOKOT-S/PERICOLACE) 8.6-50 MG per tablet 1 tablet    Or     senna-docusate (SENOKOT-S/PERICOLACE) 8.6-50 MG per tablet 2 tablet     sodium chloride (PF) 0.9% PF flush 3 mL     sodium chloride (PF) 0.9% PF flush 3 mL             Review of Systems:     See HPI above for pertinent findings.          Physical Exam:   All vitals have been reviewed  Temp:   [96.6  F (35.9  C)-97.1  F (36.2  C)] 96.8  F (36  C)  Pulse:  [60-68] 68  Resp:  [16] 16  BP: (117-142)/(51-78) 117/51  SpO2:  [98 %-99 %] 98 %    Physical Exam:   Deferred due to COVID status           Data:   All laboratory data reviewed    Results:  BMP  Recent Labs   Lab 12/06/20  0703 12/05/20  0934    139   POTASSIUM 4.2 4.6   CHLORIDE 108 109   CO2 25 27   BUN 10 7   CR 0.81 0.59   GLC 89 107*     CBC  Recent Labs   Lab 12/06/20  0703 12/05/20  0934   WBC 6.2 7.1   HGB 10.5* 12.1    229     LFT  Recent Labs   Lab 12/06/20  0703 12/05/20  1921 12/05/20  0934   AST  --   --  21   ALT  --   --  18   ALKPHOS  --   --  127   BILITOTAL  --   --  0.5   ALBUMIN  --   --  3.3*   INR 1.11 1.13  --      Recent Labs   Lab 12/06/20  0703 12/05/20  0934   GLC 89 107*       Imaging:  EXAMINATION: CT CHEST W CONT ABDOMEN PELVIS W/O & W CONT,  12/5/2020 8:49 PM     TECHNIQUE:  Helical CT images from the thoracic inlet through the  symphysis pubis were obtained  with contrast. Contrast dose: Isovue  370     COMPARISON: Same day     HISTORY: multiple lesions concerning for metastatic malignancy, breast  mass and spinal, renal, uterine, etc lesions     FINDINGS:     CHEST: Normal heart size, no pericardial effusion. Normal caliber  thoracic aorta and main pulmonary artery. Normal branching of the  thoracic great vessels. Normal thyroid. No suspicious lower cervical,  axillary, or mediastinal lymphadenopathy. Prominent, however not  enlarged left axillary lymph nodes. Normal caliber esophagus. There is  a 2.5 x 2.6 and on the left breast mass (series 3, image 109).  Questionable left nipple retraction noted.     Central tracheobronchial tree is widely patent. No pleural effusion or  pneumothorax. No focal pulmonary opacity or consolidation. No  suspicious pulmonary mass or nodule.     ABDOMEN/PELVIS: Unremarkable liver, gallbladder, pancreas, spleen,  adrenals, and left kidney. There is a 2.7 cm left inferior right  renal  simple cyst. Normal caliber small bowel. Colon is unremarkable.  Ill-defined mixed density multicystic lesions in the region of the  left ovary. Heterogenous enhancement of the uterus an thickened  endometrial stripe at 1.5 cm. Major abdominal vasculature is widely  patent. No suspicious mesenteric, retroperitoneal, inguinal, or pelvic  lymphadenopathy.     BONES: Peripherally calcified Schmorl's node of the superior endplate  of L5. Lytic lesion with superior endplate defect of the L4 vertebral  body extending into the bilateral pedicles. No acute osseous  abnormality. Similar lesion noted involving the T7 vertebral body.  Heterogeneous areas of lucency in the pelvis are also present.                                                                      IMPRESSION:  1. Left breast mass measuring approximately 2.5 x 2.6 cm, recommend  correlation with outside mammograms if available.  2. Lytic lesions of the T7 and L4 vertebral body, which are suspicious  for diastases.

## 2020-12-06 NOTE — PROGRESS NOTES
"[facility]       NEUROSURGERY CONSULTATION NOTE    This consultation was requested by Dr. Degroot from the Medicine service.    Reason for Consultation: T spine mass    HPI: Teresa Sanderson is a 45 year old female with a PMH of DVT, left breast mass, right knee \"arthritis\", who was seen for R>L low back pain that has been worsening for the past two days, MRI spine demonstrates L4 paraspinal mass that involves L4 vertebral body. Currently she denies any radiating pain or weakness. She endorses that she has limited use of her L knee for that past 5 months due to \"arthritis\". She denies urinary of bowel incontinence.       PAST MEDICAL HISTORY:   Past Medical History:   Diagnosis Date     Anxiety      Depression      DVT (deep venous thrombosis) (H) 2014     Left breast mass     x approximately 4-5 months     Pulmonary emboli (H)      Pyelonephritis      Schizoaffective disorder (H)      Tobacco use        PAST SURGICAL HISTORY:   Past Surgical History:   Procedure Laterality Date     TUBAL LIGATION  1998       FAMILY HISTORY:   Family History   Problem Relation Age of Onset     Lung Cancer Mother      Lung Cancer Father      Lupus Brother      Hypertension Other      Diabetes Other        SOCIAL HISTORY:   Social History     Tobacco Use     Smoking status: Current Every Day Smoker     Packs/day: 0.50     Types: Cigarettes     Smokeless tobacco: Never Used   Substance Use Topics     Alcohol use: Not Currently     Comment: occ       MEDICATIONS:  Medications Prior to Admission   Medication Sig Dispense Refill Last Dose     acetaminophen (TYLENOL) 325 MG tablet Take 325-650 mg by mouth every 6 hours as needed for mild pain   12/5/2020 at Unknown time     ibuprofen (ADVIL/MOTRIN) 200 MG tablet Take 200 mg by mouth every 4 hours as needed for mild pain   12/5/2020 at Unknown time       Allergies:  No Known Allergies    ROS: ROS were all negative except for pertinent positives " noted in my HPI.    Physical exam:   Blood pressure 117/51, pulse 68, temperature 96.8  F (36  C), temperature source Oral, resp. rate 16, height 1.829 m (6'), weight 104.4 kg (230 lb 2.6 oz), last menstrual period 11/21/2020, SpO2 98 %.  CV: RRR  PULM: breathing comfortably on room air  ABD: soft  NEUROLOGIC:  -- Awake; Alert; oriented x 3  -- Follows commands briskly  -- +repetition, calculation, and naming  -- Speech fluent, spontaneous. No aphasia or dysarthria.  -- no gaze preference. No apparent hemineglect.  Cranial Nerves:  -- visual fields full to confrontation, PERRL 3-2mm bilat and brisk, extraocular movements intact  -- face symmetrical, tongue midline  -- sensory V1-V3 intact bilaterally  -- palate elevates symmetrically, uvula midline  -- hearing grossly intact bilat  -- Trapezii 5/5 strength bilat symmetric  -- Cerebellar: Finger nose finger without dysmetria, intact rapid alternating motions bilaterally    Spine: Acute midline tenderness lumbar spine  Motor:  Normal bulk / tone; no tremor, rigidity, or bradykinesia.  No muscle wasting or fasciculations  No Pronator Drift       Delt Bi Tri Hand Flexion/  Extension Iliopsoas Quadriceps Hamstrings Tibialis Anterior Gastroc    C5 C6 C7 C8/T1 L2 L3 L4-S1 L4 S1   R 5 5 5 5 5 5 5 5 5   L 5 5 5 5 5 4+* (pain limited) 5 5 5   Sensory:  intact to LT x 4 extremities     Reflexes:     Bi Tri BR Anya Pat Ach Bab    C5-6 C7-8 C6 UMN L2-4 S1 UMN   R 2+ 2+ 2+ Norm 2+ 2+ Norm   L 2+ 2+ 2+ Norm 2+ 2+ Norm     Gait: Deferred      LABS:  Last Comprehensive Metabolic Panel:  Sodium   Date Value Ref Range Status   12/06/2020 139 133 - 144 mmol/L Final     Potassium   Date Value Ref Range Status   12/06/2020 4.2 3.4 - 5.3 mmol/L Final     Chloride   Date Value Ref Range Status   12/06/2020 108 94 - 109 mmol/L Final     Carbon Dioxide   Date Value Ref Range Status   12/06/2020 25 20 - 32 mmol/L Final     Anion Gap   Date Value Ref Range Status   12/06/2020 6 3 - 14 mmol/L  Final     Glucose   Date Value Ref Range Status   12/06/2020 89 70 - 99 mg/dL Final     Urea Nitrogen   Date Value Ref Range Status   12/06/2020 10 7 - 30 mg/dL Final     Creatinine   Date Value Ref Range Status   12/06/2020 0.81 0.52 - 1.04 mg/dL Final     GFR Estimate   Date Value Ref Range Status   12/06/2020 87 >60 mL/min/[1.73_m2] Final     Comment:     Non  GFR Calc  Starting 12/18/2018, serum creatinine based estimated GFR (eGFR) will be   calculated using the Chronic Kidney Disease Epidemiology Collaboration   (CKD-EPI) equation.       Calcium   Date Value Ref Range Status   12/06/2020 9.1 8.5 - 10.1 mg/dL Final     Lab Results   Component Value Date    WBC 6.2 12/06/2020     Lab Results   Component Value Date    RBC 3.75 12/06/2020     Lab Results   Component Value Date    HGB 10.5 12/06/2020     Lab Results   Component Value Date    HCT 34.0 12/06/2020     Lab Results   Component Value Date    MCV 91 12/06/2020     Lab Results   Component Value Date    MCH 28.0 12/06/2020     Lab Results   Component Value Date    MCHC 30.9 12/06/2020     Lab Results   Component Value Date    RDW 13.7 12/06/2020     Lab Results   Component Value Date     12/06/2020       IMAGING:  MRI L spine:   1. Focal bone marrow abnormalities most advanced at L4 where there is  complete involvement of the L4 vertebral body, right paraspinal mass,  and mild involvement of the left paraspinal tissues. Findings are most  likely due to metastatic disease. There is no evidence for any  epidural tumor.  2. Multilevel degenerative changes, most advanced at L4-L5 where there  is mild central canal stenosis, moderate right-sided foraminal  stenosis and mild left-sided foraminal stenosis.    ASSESSMENT: Teresa Sanderson is a 46 yo female with lower back pain, found to have paraspinal mass that invades T4 vertebral body, no compromise of spinal canal or neuroforamen, neurologically intact. Due to the location of this mass,  if tissue diagnosis is pursued, this may be more amenable to be obtained by IR guided biopsy. Recommend medical management of pain.       RECOMMENDATIONS:  No neurosurgical intervention indicated at this time   Obtain MRI lumbar spine with contrast  Consult neuro IR if needing tissue diagnosis  Neurosurgery will sign off, please do not hesitate to call with any questions    The patient was discussed with Dr. Lu, neurosurgery chief resident, and Dr. Murillo, neurosurgery staff, and they agree with the above.    Neva Ren MD  Neurosurgery Resident, PGY-1

## 2020-12-07 ENCOUNTER — APPOINTMENT (OUTPATIENT)
Dept: MRI IMAGING | Facility: CLINIC | Age: 45
End: 2020-12-07
Attending: PHYSICIAN ASSISTANT
Payer: MEDICAID

## 2020-12-07 LAB
ANION GAP SERPL CALCULATED.3IONS-SCNC: 5 MMOL/L (ref 3–14)
BASOPHILS # BLD AUTO: 0 10E9/L (ref 0–0.2)
BASOPHILS NFR BLD AUTO: 0.6 %
BUN SERPL-MCNC: 10 MG/DL (ref 7–30)
CALCIUM SERPL-MCNC: 9.2 MG/DL (ref 8.5–10.1)
CHLORIDE SERPL-SCNC: 109 MMOL/L (ref 94–109)
CO2 SERPL-SCNC: 26 MMOL/L (ref 20–32)
CREAT SERPL-MCNC: 0.73 MG/DL (ref 0.52–1.04)
CRP SERPL-MCNC: 45 MG/L (ref 0–8)
D DIMER PPP FEU-MCNC: 1.2 UG/ML FEU (ref 0–0.5)
DIFFERENTIAL METHOD BLD: ABNORMAL
EOSINOPHIL # BLD AUTO: 0.1 10E9/L (ref 0–0.7)
EOSINOPHIL NFR BLD AUTO: 1.5 %
ERYTHROCYTE [DISTWIDTH] IN BLOOD BY AUTOMATED COUNT: 13.6 % (ref 10–15)
FIBRINOGEN PPP-MCNC: 529 MG/DL (ref 200–420)
GFR SERPL CREATININE-BSD FRML MDRD: >90 ML/MIN/{1.73_M2}
GLUCOSE SERPL-MCNC: 103 MG/DL (ref 70–99)
HCT VFR BLD AUTO: 33.4 % (ref 35–47)
HGB BLD-MCNC: 10.2 G/DL (ref 11.7–15.7)
IMM GRANULOCYTES # BLD: 0 10E9/L (ref 0–0.4)
IMM GRANULOCYTES NFR BLD: 0.3 %
INR PPP: 1.16 (ref 0.86–1.14)
LDH SERPL L TO P-CCNC: 137 U/L (ref 81–234)
LYMPHOCYTES # BLD AUTO: 2.6 10E9/L (ref 0.8–5.3)
LYMPHOCYTES NFR BLD AUTO: 40.3 %
MCH RBC QN AUTO: 27.4 PG (ref 26.5–33)
MCHC RBC AUTO-ENTMCNC: 30.5 G/DL (ref 31.5–36.5)
MCV RBC AUTO: 90 FL (ref 78–100)
MONOCYTES # BLD AUTO: 0.6 10E9/L (ref 0–1.3)
MONOCYTES NFR BLD AUTO: 9 %
NEUTROPHILS # BLD AUTO: 3.1 10E9/L (ref 1.6–8.3)
NEUTROPHILS NFR BLD AUTO: 48.3 %
NRBC # BLD AUTO: 0 10*3/UL
NRBC BLD AUTO-RTO: 0 /100
PLATELET # BLD AUTO: 206 10E9/L (ref 150–450)
POTASSIUM SERPL-SCNC: 4.3 MMOL/L (ref 3.4–5.3)
RBC # BLD AUTO: 3.72 10E12/L (ref 3.8–5.2)
RETICS # AUTO: 64.7 10E9/L (ref 25–95)
RETICS/RBC NFR AUTO: 1.7 % (ref 0.5–2)
SODIUM SERPL-SCNC: 140 MMOL/L (ref 133–144)
UFH PPP CHRO-ACNC: 0.61 IU/ML
UFH PPP CHRO-ACNC: 0.83 IU/ML
UFH PPP CHRO-ACNC: >1.1 IU/ML
WBC # BLD AUTO: 6.5 10E9/L (ref 4–11)

## 2020-12-07 PROCEDURE — 85520 HEPARIN ASSAY: CPT | Performed by: INTERNAL MEDICINE

## 2020-12-07 PROCEDURE — 250N000013 HC RX MED GY IP 250 OP 250 PS 637: Performed by: NURSE PRACTITIONER

## 2020-12-07 PROCEDURE — 70553 MRI BRAIN STEM W/O & W/DYE: CPT

## 2020-12-07 PROCEDURE — 36415 COLL VENOUS BLD VENIPUNCTURE: CPT | Performed by: INTERNAL MEDICINE

## 2020-12-07 PROCEDURE — 80048 BASIC METABOLIC PNL TOTAL CA: CPT | Performed by: PHYSICIAN ASSISTANT

## 2020-12-07 PROCEDURE — 250N000013 HC RX MED GY IP 250 OP 250 PS 637: Performed by: INTERNAL MEDICINE

## 2020-12-07 PROCEDURE — 85520 HEPARIN ASSAY: CPT | Performed by: NURSE PRACTITIONER

## 2020-12-07 PROCEDURE — 85025 COMPLETE CBC W/AUTO DIFF WBC: CPT | Performed by: PHYSICIAN ASSISTANT

## 2020-12-07 PROCEDURE — 70553 MRI BRAIN STEM W/O & W/DYE: CPT | Mod: 26 | Performed by: STUDENT IN AN ORGANIZED HEALTH CARE EDUCATION/TRAINING PROGRAM

## 2020-12-07 PROCEDURE — 99221 1ST HOSP IP/OBS SF/LOW 40: CPT | Mod: GC | Performed by: INTERNAL MEDICINE

## 2020-12-07 PROCEDURE — 99233 SBSQ HOSP IP/OBS HIGH 50: CPT | Performed by: INTERNAL MEDICINE

## 2020-12-07 PROCEDURE — 250N000011 HC RX IP 250 OP 636: Performed by: INTERNAL MEDICINE

## 2020-12-07 PROCEDURE — 36415 COLL VENOUS BLD VENIPUNCTURE: CPT | Performed by: NURSE PRACTITIONER

## 2020-12-07 PROCEDURE — 99254 IP/OBS CNSLTJ NEW/EST MOD 60: CPT | Performed by: RADIOLOGY

## 2020-12-07 PROCEDURE — 86140 C-REACTIVE PROTEIN: CPT | Performed by: PHYSICIAN ASSISTANT

## 2020-12-07 PROCEDURE — 85610 PROTHROMBIN TIME: CPT | Performed by: NURSE PRACTITIONER

## 2020-12-07 PROCEDURE — 85045 AUTOMATED RETICULOCYTE COUNT: CPT | Performed by: PHYSICIAN ASSISTANT

## 2020-12-07 PROCEDURE — 85379 FIBRIN DEGRADATION QUANT: CPT | Performed by: NURSE PRACTITIONER

## 2020-12-07 PROCEDURE — 255N000002 HC RX 255 OP 636: Performed by: INTERNAL MEDICINE

## 2020-12-07 PROCEDURE — A9585 GADOBUTROL INJECTION: HCPCS | Performed by: INTERNAL MEDICINE

## 2020-12-07 PROCEDURE — 250N000011 HC RX IP 250 OP 636: Performed by: PHYSICIAN ASSISTANT

## 2020-12-07 PROCEDURE — 120N000002 HC R&B MED SURG/OB UMMC

## 2020-12-07 PROCEDURE — 83615 LACTATE (LD) (LDH) ENZYME: CPT | Performed by: PHYSICIAN ASSISTANT

## 2020-12-07 PROCEDURE — 250N000013 HC RX MED GY IP 250 OP 250 PS 637: Performed by: PHYSICIAN ASSISTANT

## 2020-12-07 PROCEDURE — 85384 FIBRINOGEN ACTIVITY: CPT | Performed by: NURSE PRACTITIONER

## 2020-12-07 PROCEDURE — 250N000012 HC RX MED GY IP 250 OP 636 PS 637: Performed by: INTERNAL MEDICINE

## 2020-12-07 RX ORDER — WARFARIN SODIUM 7.5 MG/1
7.5 TABLET ORAL
Status: COMPLETED | OUTPATIENT
Start: 2020-12-07 | End: 2020-12-07

## 2020-12-07 RX ORDER — GADOBUTROL 604.72 MG/ML
10 INJECTION INTRAVENOUS ONCE
Status: COMPLETED | OUTPATIENT
Start: 2020-12-07 | End: 2020-12-07

## 2020-12-07 RX ORDER — DEXAMETHASONE 4 MG/1
4 TABLET ORAL EVERY 6 HOURS SCHEDULED
Status: DISCONTINUED | OUTPATIENT
Start: 2020-12-07 | End: 2020-12-10 | Stop reason: HOSPADM

## 2020-12-07 RX ADMIN — HYDROMORPHONE HYDROCHLORIDE 0.5 MG: 1 INJECTION, SOLUTION INTRAMUSCULAR; INTRAVENOUS; SUBCUTANEOUS at 03:00

## 2020-12-07 RX ADMIN — NICOTINE 1 PATCH: 14 PATCH, EXTENDED RELEASE TRANSDERMAL at 12:24

## 2020-12-07 RX ADMIN — HEPARIN SODIUM 1600 UNITS/HR: 10000 INJECTION, SOLUTION INTRAVENOUS at 01:52

## 2020-12-07 RX ADMIN — POLYETHYLENE GLYCOL 3350 17 G: 17 POWDER, FOR SOLUTION ORAL at 10:00

## 2020-12-07 RX ADMIN — OXYCODONE HYDROCHLORIDE 10 MG: 5 TABLET ORAL at 10:00

## 2020-12-07 RX ADMIN — QUETIAPINE FUMARATE 100 MG: 100 TABLET ORAL at 21:16

## 2020-12-07 RX ADMIN — DOCUSATE SODIUM AND SENNOSIDES 1 TABLET: 8.6; 5 TABLET ORAL at 20:31

## 2020-12-07 RX ADMIN — HEPARIN SODIUM 1200 UNITS/HR: 10000 INJECTION, SOLUTION INTRAVENOUS at 21:16

## 2020-12-07 RX ADMIN — OXYCODONE HYDROCHLORIDE 10 MG: 5 TABLET ORAL at 17:18

## 2020-12-07 RX ADMIN — DEXAMETHASONE 4 MG: 4 TABLET ORAL at 13:35

## 2020-12-07 RX ADMIN — SERTRALINE HYDROCHLORIDE 50 MG: 50 TABLET ORAL at 10:01

## 2020-12-07 RX ADMIN — OXYCODONE HYDROCHLORIDE 10 MG: 5 TABLET ORAL at 13:35

## 2020-12-07 RX ADMIN — HYDROMORPHONE HYDROCHLORIDE 0.5 MG: 1 INJECTION, SOLUTION INTRAMUSCULAR; INTRAVENOUS; SUBCUTANEOUS at 21:16

## 2020-12-07 RX ADMIN — HYDROMORPHONE HYDROCHLORIDE 0.5 MG: 1 INJECTION, SOLUTION INTRAMUSCULAR; INTRAVENOUS; SUBCUTANEOUS at 13:35

## 2020-12-07 RX ADMIN — OXYCODONE HYDROCHLORIDE 10 MG: 5 TABLET ORAL at 21:16

## 2020-12-07 RX ADMIN — WARFARIN SODIUM 7.5 MG: 7.5 TABLET ORAL at 17:18

## 2020-12-07 RX ADMIN — METHOCARBAMOL 500 MG: 500 TABLET, FILM COATED ORAL at 20:31

## 2020-12-07 RX ADMIN — HYDROMORPHONE HYDROCHLORIDE 0.5 MG: 1 INJECTION, SOLUTION INTRAMUSCULAR; INTRAVENOUS; SUBCUTANEOUS at 10:00

## 2020-12-07 RX ADMIN — METHOCARBAMOL 500 MG: 500 TABLET, FILM COATED ORAL at 11:25

## 2020-12-07 RX ADMIN — HEPARIN SODIUM 1500 UNITS/HR: 10000 INJECTION, SOLUTION INTRAVENOUS at 17:18

## 2020-12-07 RX ADMIN — DOCUSATE SODIUM AND SENNOSIDES 1 TABLET: 8.6; 5 TABLET ORAL at 10:00

## 2020-12-07 RX ADMIN — HYDROMORPHONE HYDROCHLORIDE 0.5 MG: 1 INJECTION, SOLUTION INTRAMUSCULAR; INTRAVENOUS; SUBCUTANEOUS at 17:18

## 2020-12-07 RX ADMIN — METHOCARBAMOL 500 MG: 500 TABLET, FILM COATED ORAL at 10:00

## 2020-12-07 RX ADMIN — METHOCARBAMOL 500 MG: 500 TABLET, FILM COATED ORAL at 17:18

## 2020-12-07 RX ADMIN — GADOBUTROL 10 ML: 604.72 INJECTION INTRAVENOUS at 19:47

## 2020-12-07 RX ADMIN — DEXAMETHASONE 4 MG: 4 TABLET ORAL at 17:19

## 2020-12-07 ASSESSMENT — ACTIVITIES OF DAILY LIVING (ADL)
ADLS_ACUITY_SCORE: 16

## 2020-12-07 ASSESSMENT — MIFFLIN-ST. JEOR: SCORE: 1840

## 2020-12-07 NOTE — PLAN OF CARE
Assumed cares 2300 - 0730    /67 (BP Location: Left arm)   Pulse 67   Temp 97.1  F (36.2  C) (Oral)   Resp 16   Ht 1.829 m (6')   Wt 110 kg (242 lb 8.1 oz)   LMP 11/21/2020   SpO2 94%   BMI 32.89 kg/m      A&Ox4. VSS on RA. Pt reported pain in back. Dilaudid given - pt reported some relief. L breast mass. Independent in room. Slept throughout shift. L PIV heparin gtt @ 1600 units/hr. Next recheck at 1015. MRI scheduled for this morning. Will continue to monitor and notify MD of any changes.     Eufemia Amor RN on 12/7/2020 at 6:42 AM

## 2020-12-07 NOTE — PHARMACY-RX INSURANCE COVERAGE
Discharge Pharmacy Test Claim    Received pharmacy liaison consult to look into apixaban coverage. Pt does not have any insurance listed on file confirmed by financial counseling notes. Cash price for 60 tablets of apixaban is $604.83. [facility] has one month free trial vouchers available. Note xarelto is preferred by [place] [employer] insurances.     Addendum 12/8: Pt [place]. Liaison ran test script and xarelto is covered with $0 copay. Eliquis is not covered.    Liz Ponce  [facility] Liaison  Ph: [phone] Page: [phone]

## 2020-12-07 NOTE — PLAN OF CARE
Cares 1900 to 2300    /67 (BP Location: Left arm)   Pulse 67   Temp 97.1  F (36.2  C) (Oral)   Resp 16   Ht 1.829 m (6')   Wt 110 kg (242 lb 8.1 oz)   LMP 11/21/2020   SpO2 94%   BMI 32.89 kg/m      A&o x4, cooperative. Endorses pain in back, managed with IV dilaudid & oxy 10mg, & heat packs. Up ad cheryl.     Hep gtt infusing @1600 units/hr. Next recheck at 0330.     MRI tomorrow morning, checklist with MRI.

## 2020-12-07 NOTE — CONSULTS
Health Psychology                   Clinic    Department of Medicine  Lianne Nava, Ph.D., L.P. [phone]                        [facility]  [facility] Makenna Marie, Ph.D., L.P. [phone]                 3rd Floor  [place] [address]   Shreya Acevedo, Ph.D.  [phone]     [address] Wilfredo Cruz, Ph.D.,  L.P. [phone]      [place]   [address]  [place] [address]           Medhat Ortiz, Ph.D. [phone]      Micheline Castillo, Ph.D., L.P. [phone]    Darius Heredia, Ph.D., A.B.P.P., L.P. [phone]     Gunjan Levy, Ph.D., L.P. [phone]    Inpatient Health Psychology Consultation - Telephone Visit    This telehealth service is appropriate and effective for delivering services in light of the necessity for social distancing to mitigate the COVID-19 epidemic and for conservation of PPE.     Patient has agreed to receiving telehealth services after being informed about it: Yes    Time service started: 10:40  Time service ended: 11:15    Mode of transmission: Telephone    Location of originating:  Hospital room    Distance site:  Home office of provider for [facility]    The patient has been notified that:  Video/Telephone visits will be conducted via a call with their psychologist to provide the care they need with a video conversation. Video/Telephone visits may be billed at different rates depending on insurance coverage.  Patients are advised to please contact their insurance provider with any questions about their health insurance coverage. If during the course of a call the psychologist feels a video/telephone visit is not appropriate, patients will not be charged for this service.      Date of Service:  12/7/20    BACKGROUND:  Per EMR: Teresa Sanderson is a 45 year old female with pyleonephritis, DVT, left breast mass who was admitted to [facility]   "on 12/5/2020 for evaluation of  severe L>R back pain, hematuria, loss of appetiteand found to be COVID-19 positive. Ongoing work-up for breast mass and lytic oseous lesions in spine.     Health Psychology consulted to support patient in coping with likely cancer diagnosis. Psychiatry met with patient 12/6/20 as well as spiritual health.  See notes for additional background and evaluation.     SUBJECTIVE:  Spoke with Ms. Sanderson over the phone today. Introduced Health Psychology services and discussed nature of referral.  Ms. Sanderson thought it would be helpful to talk to someone today about her experiences and reactions to hospitalization and possible cancer diagnosis.     Supported her in identifying emotional experiences, primarily fear of the future, sadness about how her health issues can affect her family, and anger with herself for not having sought healthcare sooner.  She said her mood has been \"up and down\" and she has been crying off and on. Validated Ms. Sanderson' emotional experiences and provided space to discuss her reactions. Reviewed sources of support and her expectations for news from her medical team. Discussed her strengths and natural coping strategies she has been utilizing. Encouraged continued engagement with prayer and connecting with supportive friends and family.      Discussed needs and goals for ongoing psychological support.  She said she thinks that speaking again would be helpful as she continues to process her health news.     Ms. Sanderson was engaged throughout the visit and expressed appreciation for phone call.     OBJECTIVE:  Spoke with Ms. Sanderson via telephone. She reported sad mood. Tearful at times.  Thought processes logical and linear.  Insight and judgment good. Speech WNL. Denied suicidal ideation.        ASSESSMENT:  Ms. Sanderson has a history of schizoaffective disorder and depression. She reports symptoms of depression and anxiety in response to health " issues/hospitalization. She has various strengths and sources of support that will help her navigate upcoming challenges, but she also has multiple complex psychosocial stressors that are straining her coping skills.       DIAGNOSIS:  Adjustment disorder with mixed anxiety and depressed mood  History of schizoaffective disorder        PLAN:  Plan for health psychology to follow this patient approximately once per week for the duration of their hospitalization. Also provided Ms. Sanderson with this provider's phone number if she would like to meet earlier.     Please feel free to call in urgent concerns arise prior  to the next follow-up session.     Diana Meléndez, PhD,   Health Psychology Fellow  Phone: [phone]
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
## 2020-12-07 NOTE — CONSULTS
[facility] Oncology Consultation    Teresa Sanderson MRN# [id number]   Age: 45 year old YOB: 1975     Date of Admission:  12/5/2020    Reason for consult: Recommend management plan and follow up for the treatment of Left breast mass, concern for breast adenocarcinoma and metastasis in L4.        Requesting physician: Dr. Degroot                  Assessment and Plan:   Assessment:   Mrs. Sanderson is a 45 years old female with significant past medical history of DVT/PE on 2014 on chronic anticoagulation since then (alhought has been off anticoagulation for the last 5 months due to social problems) and long standing history of left breast mass. Patient presented to this admission for lower back pain, progression of breast mass size. Patient was found to have asymptomatic COVID +19 infection and lower extremity DVT.    #Chronic left breast mass  #Intense lower back pain and lytic L4 lesion, no compression or neurologic involvement  #Acute gross hematuria x3 days, resolved  Patient has chronic and progressive history of breast mass since November 2019. Now presented with back pain and was found to have lytic lesion in L4, and new lower extremity DVT. We do not have access to her medical records but history is concerning for significan unintentional weight loss, intense back pain, loss of appetite and breast discharge. Patient has not had a breast biopsy. IR and surgery were consulted for breast biopsy but this has been referred to be done at [facility] [place] as outpatient.  Plan:   -Agreed with biopsy scheduled for 12/17/20 at [facility].  -Recommend starting dexamethasone 4mg oral bid for pain; and consult Radiation Oncology to assess possible relief from symptomatic tumor metastasis to the spine (without evident cord  compression).  -Rest of pain management per primary team.   -Agreed  involvement to assist obtaining insurance coverage.   -Following urine cytology.    #Acute on  chronic VTE  #H/o PE/DVT , on chronic anticoagulation  Caused of first DVT c/b PE is unclear as we do not have patient's outside hospital records   New left popliteal vein DVT. Patient is likely to have a pro-coagulable state due to malignancy, also she has not being taking apixaban since July due to running out of medication.   Plan:   -Agreed with high dose intensity heparin gtt.   -Agreed to plan transition to coumadin and discontinue tx with apixaban due to high cost.     #Mild chronic normocytic anemia  DDX includes iron deficiency 2/2 heavy vaginal bleeding during her periods vs hypo proliferative condition (retics index 0.9%). Less likely hemolysis due to normal bili levels.   -Will defer further investigation to primary team.     #Asymptomatic COVID +19 infection without hypoxia.     Patient was discussed with Dr. Guzman.     Zakia Mustafa MD  Internal Medicine Resident PGY-2  P              Chief Complaint:   Breast mass and lower back, concern for metastasis.      History is obtained from the patient         History of Present Illness (Resident / Clinician):     Mrs. Sanderson is a 45 years old female with significant past medical history of DVT/PE on 2014 on chronic anticoagulation since then (alhought has been off anticoagulation for the last 5 months due to social problems) and long standing history of left breast mass. Patient presented to this admission for lower back pain, progression of breast mass size, unintentional weight loss. Patient was found to have asymptomatic COVID +19 infection and new onset of  lower extremity DVT.    Patient felt breast mass for the first time in November 2019. She had a mammogram at [facility] and based on these results she was reassure mass was bening. No biopsy was done by that time.   7 months ago, she moved to [place] and endorsed left breast mass growth and clear breast discharge from left nipple. Patient was evaluated in our ED, breast US  ruled out breast abscess and patient was discharged from ED with plan for further evaluation as outpatient, but she had financial and social problems that preclude her to come.     Now presented with localized severe lower back pain, that does not radiates to lower extremities or causes numbness, tingling or weakness. No changes in her urinary or stool pattern, not worsening incontinence or constipation.   Adittionally reports fatigue, appetite loss and significant and unintentional weight loss.     Of note, she has not been taking apixaban for about last 5 months due to lack of PCP and insurance.    HD stable at arrival and afebrile, was found to have lytic lesion in L4. No prior history of malignancy.     ROS:   Denies  Dysuria. Lower extemity numbness, pain, weakness, swelling.   Shortnesss of breath, upper respiratory symptoms, nausea, diarrhea.   No new lower extremity weakness  or  concern with bladder bowel control.          Cancer Treatment History:   None          Past Medical History:   I have reviewed this patient's past medical history.   -2014 H/o DVT/PE, on chronic anticoagulation until July 2020. Initially, she was started briefly on coumadin and most recently was on apixaban.   -Denies history of diabetes, hypertension, cancer.          Past Surgical History:   This patient has no significant past surgical history          Social History:     Social History     Tobacco Use     Smoking status: Current Every Day Smoker     Packs/day: 0.50     Types: Cigarettes     Smokeless tobacco: Never Used   Substance Use Topics     Alcohol use: Not Currently     Comment: occ     Additionally, patient has a complex social history, she lives in a truck. She provides care for her sister who has autism (age 50) and also helps daughter by taking care of her two infant kids.   Has inistable living facilities and has not insurance coverage. She is currently unemployed          Family History:   I have reviewed this  patient's family history.   Father  with lung cancer.   Other than that patient has not had a significant past medical history for breast cancer, ovarian cancer or other type of cancer.    Mother has diabetes and hypertension         Immunizations:   Immunization status is unknown          Allergies:   Denies allergies          Medications:     Medications Prior to Admission   Medication Sig Dispense Refill Last Dose     acetaminophen (TYLENOL) 325 MG tablet Take 325-650 mg by mouth every 6 hours as needed for mild pain   2020 at Unknown time     ibuprofen (ADVIL/MOTRIN) 200 MG tablet Take 200 mg by mouth every 4 hours as needed for mild pain   2020 at Unknown time     apixaban ANTICOAGULANT (ELIQUIS) 5 MG tablet Take 5 mg by mouth 2 times daily Per patient, she was on  Eliquis since 2016 for blood clots in her leg and in her lungs.   More than a month at Unknown time             Review of Systems:   CONSTITUTIONAL: NEGATIVE for fever, chills, change in weight  ENT/MOUTH: NEGATIVE for ear, mouth and throat problems  RESP: NEGATIVE for significant cough or SOB  CV: NEGATIVE for chest pain, palpitations or peripheral edema  : denies dysuria. Had hematuria for three days that self resolved. Denies tenesmus.    Female, has heavy menstrual bleeding. Denies abnormal bleeding. Denies abdominal pain.   Patient has chronic urinary incontinence.            Physical exam:          Physical Exam (Resident / Clinician):   Vitals were reviewed and were stable.   P. Exam limited due to remote visit through camera.   General: alert, orientated, does not look in acute distress, breathing comfortable without requiring oxygen.   Per primary team note,   CVS +S1/S2, no murmurs  Lungs: clear breath sounds bilaterally.   Extremities: no peripheral edema.   CNS: alert, orientated x4, no neurologic deficit.            Data:   All laboratory data reviewed    All imaging studies reviewed by me.    CT chest and abdomen  12/05/20  IMPRESSION:   1.  No urinary calculi or evidence for urinary obstruction.  2.  Ill-defined L4 lytic lesion with a rind of associated soft tissue  about the L4 vertebral body, suspicious for malignancy, such as  metastatic disease. Vertebral body infection is an additional  differential consideration, but is considered less likely. Lumbar  spine MRI is recommended for further characterization.  3.  Several smaller ill-defined areas of lytic change are noted within  the pelvic bones, which could also represent metastatic disease, and  should be further evaluated with MRI.  4.  There is a 3 cm low-density lesion in the lower pole of the right  kidney. This finding is not well evaluated on this noncontrast scan,  but most likely represents a cyst. Ultrasound is recommended to  confirm the cystic nature of this right renal lesion.    MRI 12/05/20  IMPRESSION:  1. Focal bone marrow abnormalities most advanced at L4 where there is  complete involvement of the L4 vertebral body, right paraspinal mass,  and mild involvement of the left paraspinal tissues. Findings are most  likely due to metastatic disease. There is no evidence for any  epidural tumor.  2. Multilevel degenerative changes, most advanced at L4-L5 where there  is mild central canal stenosis, moderate right-sided foraminal  stenosis and mild left-sided foraminal stenosis.               ATTENDING PHYSICIAN ADDENDUM AND NOTE:  I evaluated this patient s case separately and also with the IM resident and fellow. The note above reflects my assessment and plan. I have personally reviewed lab results, and vital and radiology results. >50% of time was spent in assessment and coordination of care; >=35 minutes.  Mrs. Teresa Sanderson is a 45 yr old woman with a complex history, admitted to our hospital with symptomatic COVID-19 infection as well as lower back pain secondary to impingement from a tumor in the L4 region; she also has a 2+cm left breast mass  concerning for primary breast carcinoma, and a right lower kidney pole mass also concerning for metastasis to that site in light of hematuria. Social barriers to care include homelessness and lack of access to other resources; she is a primary caregiver for at least one family member here in the [place] area. She has recently lived in the [place] area taking care of family there as well. She has history of DVT + PE of unclear etiology dating back to 2014; she has been off of anticoagulation due to lack of access to medical care.  Pending resolution of COVID infection symptoms, biopsy of the primary breast mass may be performed as an outpatient. We look forward to assessment from Radiation Oncology as to whether she would benefit from palliative short-course radiation to the symptomatic L4 lesion; of note, the patient does not report any symptoms compatible with cauda equina or cord compression. Dexamethasone may be helpful for relief of the tumor-related pain in the spine.  Khang Guzman MD, PhD  Associate Professor of Medicine, Hematology, Oncology and Transplantation

## 2020-12-07 NOTE — PROGRESS NOTES
[facility]     Medicine Progress Note - Hospitalist Service, Gold 8       Date of Admission:  12/5/2020  Assessment & Plan       Teresa Sanderson is a 45 year old female with pyleonephritis, DVT, left breast mass who was admitted to [facility]  on 12/5/2020 for evaluation of  severe L>R back pain, hematuria, loss of appetiteand found to be COVID-19 positive.    ACTIVE HOSPITAL PROBLEMS:  Breast mass concerning for malignancy with e/o metastatic disease on imaging  On exam ~3cm left upper outter quadrant mass that is somewhat fixed, smooth and round, no discharge or skin changes, no nipple discharge, tender lump of anterior axilla ~2cm. US breast 10/25/20 did not identify mass or abscess.  She reportedly had mammogram x 2 at [facility] showing benign breast mass. Given in context of multiple organ and bony lesions, left leg weakness, marked weight loss is highly suggestive of malignancy  -  MRI brain, - CT CAP with and without contrast reviewed  -  IR consult reviewed for biopsy left breast lesion, D/W Gen Sx as well, D/W neurosurgery consult     12/7: D/W Breast Center @ [facility] (Dan: [phone]) and MD : Plan for Breast biopsy on 17th at [facility]. Form sent to get old records    - appreciate Oncology review, will start Dexamethasone 6mg q6 and Rad-Onc consult      Acute back pain (secondary to lytic spinal lesion L4 and L5 and pelvis)  Occurs in the setting of above and in the setting of hematuria. CT showing lytic lesions in L4-L5 and in the pelvis concerning for metastatic disease. MRI lumbar spine w/o contrast shows complete involvement of bone marrow abnormality in L4 and several focal abnormalities c/w mets, mild central canal stenosis, moderate right-sided foraminal stenosis and mild left-sided foraminal stenosis.   - see also above workup of breast mass  - pain mng: APAP prn, oxycodone 5-10mg q3h prn and  dilaudid 0.5mg q3h prn, increase if insufficient pain control. Add Robaxin and minimize opioids  - PT/OT      Left popliteal vein DVT:   - Ct on Heparin gtt . (Was on Apixaban until July when lost insurance). Checked with Pharm D and not covered.   - Start coumadin 12/7 . Will D/W Oncology for ideal agent in lieu of metastatic malignancy and financial concerns    History of pyelonephritis  Hx of gross hematuria  UA in ED with mucous, 15 WBC, 1 RBC, + 10 albumin, 3 squams. CT A/P w/o contrast with right lower pole heterogeneous lesion. Hematuria x 3 days in a.m. and then urine clears, + bilateral low back and CVA tenderness in the setting of above.    - urine cytology   - follow up urine culture  - trend BMP         COVID-19 Diagnosis Details:  Onset of COVID symptoms N/a remains asymptomatic   Date of positive test results 12/5/20   Research study Pending review      # Confirmed COVID-19 infection, asymptomatic  - not on supplemental O2).   - Admit to medical floor with continuous pulse oximetry, COVID-19 special precautions, minimized lab draws, and care consolidation  - Oxygen: continue current support with no supplemental O2 needs; titrate to keep SpO2 between 90-96%  - Labs: CBC with differential, CMP, reticulocyte count, PT/INR, D-dimer, CPK, ferritin, LDH, troponin, CRP and procalcitonin done on admission and reviewed by me. Will trend daily until patient reaches clinical stability.  - Imaging: images for malignancy workup: CXR, brain MRI, CT CAP w/ and w/o contrast, LE duplex  - Breathing treatments: no inhalers needed; avoid nebulizers in favor of MDIs   - IV fluids: not indicated at this time  - Antibiotics: not indicated   - Treatments: No COVID-specific therapies are appropriate at this time.  - Research study protocol: pending review  - DVT Prophylaxis: At high risk of thrombotic complications due to COVID-19 disease (last DDimer displayed below).         on full anticoagulation      Chronic, stable  Medical Problems:  History of a DVT and PE  - as above     Tobacco use disorder  - nicotine patch panel ordered     Crisostomo Catheter: not present  Bedside iPad: NOT used due to severity of illness/medical appropriateness       Diet: Regular Diet Adult    DVT Prophylaxis: heparin  Crisostomo Catheter: not present  Code Status: Full Code         Disposition Plan   Expected discharge: 2 - 3 days, recommended to prior living arrangement once adequate pain management/ tolerating PO medications and anticoagulatin plan.  Entered: Perry Degroot MD, MD 12/07/2020, 1:00 PM     The patient's care was discussed with the Bedside Nurse, Care Coordinator/, Patient, onclogy Consultant and CSC  Team.    Perry Degroot MD, MD  Hospitalist Service, [address]   Contact information available via [facility] Paging/Directory  Please see sign in/sign out for up to date coverage information  ______________________________________________________________________    Interval History   Continues to have pain- relatively controlled on meds  No numbness. No incontinence  Feels a bit SOB today   Denies Chest pain, Denies any N&V/ bowel problems  Denies urinary problems , Denies fever/chills/rigors     Data reviewed today: I reviewed all medications, new labs and imaging results over the last 24 hours. I personally reviewed CT/MRI as noted.    Physical Exam   BP (!) 141/98 (BP Location: Left arm)   Pulse 61   Temp 97.4  F (36.3  C) (Oral)   Resp 18   Ht 1.829 m (6')   Wt 110 kg (242 lb 8.1 oz)   LMP 11/21/2020   SpO2 100%   BMI 32.89 kg/m    Gen- pleasant lying in bed  HEENT- NAD, FAWN  Neck- supple, no JVD elevation, no thyromegaly  CVS- I+II+ no m/r/g  RS- CTAB  Abdo- soft, no tenderness . No g/r/r   Ext- no edema   CNS- oriented to person/ place/ time     Data    Imaging reviewed in [facility]  Recent Labs   Lab 12/07/20  0330 12/06/20  0703 12/05/20  0934    139 139    POTASSIUM 4.3 4.2 4.6   CHLORIDE 109 108 109   NANDO 9.2 9.1 8.8   CO2 26 25 27   BUN 10 10 7   CR 0.73 0.81 0.59   * 89 107*     CBC  Recent Labs   Lab 12/07/20  0330 12/06/20  1423 12/06/20  0703 12/05/20  0934   WBC 6.5 6.2 6.2 7.1   RBC 3.72* 3.71* 3.75* 4.32   HGB 10.2* 10.2* 10.5* 12.1   HCT 33.4* 32.7* 34.0* 38.1   MCV 90 88 91 88   MCH 27.4 27.5 28.0 28.0   MCHC 30.5* 31.2* 30.9* 31.8   RDW 13.6 13.7 13.7 13.7    203 199 229     INR  Recent Labs   Lab 12/07/20  0330 12/06/20  0703 12/05/20  1921   INR 1.16* 1.11 1.13     LFTs  Recent Labs   Lab 12/05/20  0934   ALKPHOS 127   AST 21   ALT 18   BILITOTAL 0.5   PROTTOTAL 8.2   ALBUMIN 3.3*

## 2020-12-08 LAB
ALBUMIN SERPL-MCNC: 3.1 G/DL (ref 3.4–5)
ALP SERPL-CCNC: 116 U/L (ref 40–150)
ALT SERPL W P-5'-P-CCNC: 24 U/L (ref 0–50)
ANION GAP SERPL CALCULATED.3IONS-SCNC: 5 MMOL/L (ref 3–14)
AST SERPL W P-5'-P-CCNC: 15 U/L (ref 0–45)
BASOPHILS # BLD AUTO: 0 10E9/L (ref 0–0.2)
BASOPHILS NFR BLD AUTO: 0.1 %
BILIRUB SERPL-MCNC: 0.2 MG/DL (ref 0.2–1.3)
BUN SERPL-MCNC: 9 MG/DL (ref 7–30)
CALCIUM SERPL-MCNC: 9.7 MG/DL (ref 8.5–10.1)
CHLORIDE SERPL-SCNC: 104 MMOL/L (ref 94–109)
CO2 SERPL-SCNC: 26 MMOL/L (ref 20–32)
CREAT SERPL-MCNC: 0.74 MG/DL (ref 0.52–1.04)
CRP SERPL-MCNC: 36 MG/L (ref 0–8)
D DIMER PPP FEU-MCNC: 1.2 UG/ML FEU (ref 0–0.5)
DIFFERENTIAL METHOD BLD: NORMAL
EOSINOPHIL # BLD AUTO: 0 10E9/L (ref 0–0.7)
EOSINOPHIL NFR BLD AUTO: 0 %
ERYTHROCYTE [DISTWIDTH] IN BLOOD BY AUTOMATED COUNT: 13.2 % (ref 10–15)
FIBRINOGEN PPP-MCNC: 572 MG/DL (ref 200–420)
GFR SERPL CREATININE-BSD FRML MDRD: >90 ML/MIN/{1.73_M2}
GLUCOSE SERPL-MCNC: 140 MG/DL (ref 70–99)
HCT VFR BLD AUTO: 36.9 % (ref 35–47)
HGB BLD-MCNC: 11.7 G/DL (ref 11.7–15.7)
IMM GRANULOCYTES # BLD: 0.1 10E9/L (ref 0–0.4)
IMM GRANULOCYTES NFR BLD: 1.3 %
INR PPP: 1.11 (ref 0.86–1.14)
LDH SERPL L TO P-CCNC: 165 U/L (ref 81–234)
LYMPHOCYTES # BLD AUTO: 1.2 10E9/L (ref 0.8–5.3)
LYMPHOCYTES NFR BLD AUTO: 16.2 %
MCH RBC QN AUTO: 28.1 PG (ref 26.5–33)
MCHC RBC AUTO-ENTMCNC: 31.7 G/DL (ref 31.5–36.5)
MCV RBC AUTO: 89 FL (ref 78–100)
MONOCYTES # BLD AUTO: 0.4 10E9/L (ref 0–1.3)
MONOCYTES NFR BLD AUTO: 5.7 %
NEUTROPHILS # BLD AUTO: 5.8 10E9/L (ref 1.6–8.3)
NEUTROPHILS NFR BLD AUTO: 76.7 %
NRBC # BLD AUTO: 0 10*3/UL
NRBC BLD AUTO-RTO: 0 /100
PLATELET # BLD AUTO: 243 10E9/L (ref 150–450)
POTASSIUM SERPL-SCNC: 4.4 MMOL/L (ref 3.4–5.3)
PROT SERPL-MCNC: 7.8 G/DL (ref 6.8–8.8)
RBC # BLD AUTO: 4.17 10E12/L (ref 3.8–5.2)
RETICS # AUTO: 65.5 10E9/L (ref 25–95)
RETICS/RBC NFR AUTO: 1.6 % (ref 0.5–2)
SODIUM SERPL-SCNC: 136 MMOL/L (ref 133–144)
TROPONIN I SERPL-MCNC: <0.015 UG/L (ref 0–0.04)
UFH PPP CHRO-ACNC: 0.38 IU/ML
UFH PPP CHRO-ACNC: 0.52 IU/ML
WBC # BLD AUTO: 7.6 10E9/L (ref 4–11)

## 2020-12-08 PROCEDURE — 250N000011 HC RX IP 250 OP 636: Performed by: PHYSICIAN ASSISTANT

## 2020-12-08 PROCEDURE — 250N000013 HC RX MED GY IP 250 OP 250 PS 637: Performed by: STUDENT IN AN ORGANIZED HEALTH CARE EDUCATION/TRAINING PROGRAM

## 2020-12-08 PROCEDURE — 250N000013 HC RX MED GY IP 250 OP 250 PS 637: Performed by: NURSE PRACTITIONER

## 2020-12-08 PROCEDURE — 99233 SBSQ HOSP IP/OBS HIGH 50: CPT | Performed by: STUDENT IN AN ORGANIZED HEALTH CARE EDUCATION/TRAINING PROGRAM

## 2020-12-08 PROCEDURE — 80053 COMPREHEN METABOLIC PANEL: CPT | Performed by: PHYSICIAN ASSISTANT

## 2020-12-08 PROCEDURE — 85379 FIBRIN DEGRADATION QUANT: CPT | Performed by: PHYSICIAN ASSISTANT

## 2020-12-08 PROCEDURE — 85520 HEPARIN ASSAY: CPT | Performed by: INTERNAL MEDICINE

## 2020-12-08 PROCEDURE — 85610 PROTHROMBIN TIME: CPT | Performed by: PHYSICIAN ASSISTANT

## 2020-12-08 PROCEDURE — 85045 AUTOMATED RETICULOCYTE COUNT: CPT | Performed by: PHYSICIAN ASSISTANT

## 2020-12-08 PROCEDURE — 250N000013 HC RX MED GY IP 250 OP 250 PS 637: Performed by: INTERNAL MEDICINE

## 2020-12-08 PROCEDURE — 250N000011 HC RX IP 250 OP 636: Performed by: INTERNAL MEDICINE

## 2020-12-08 PROCEDURE — 86140 C-REACTIVE PROTEIN: CPT | Performed by: PHYSICIAN ASSISTANT

## 2020-12-08 PROCEDURE — 250N000012 HC RX MED GY IP 250 OP 636 PS 637: Performed by: INTERNAL MEDICINE

## 2020-12-08 PROCEDURE — 36415 COLL VENOUS BLD VENIPUNCTURE: CPT | Performed by: PHYSICIAN ASSISTANT

## 2020-12-08 PROCEDURE — 83615 LACTATE (LD) (LDH) ENZYME: CPT | Performed by: PHYSICIAN ASSISTANT

## 2020-12-08 PROCEDURE — 99233 SBSQ HOSP IP/OBS HIGH 50: CPT | Mod: GC | Performed by: INTERNAL MEDICINE

## 2020-12-08 PROCEDURE — 85025 COMPLETE CBC W/AUTO DIFF WBC: CPT | Performed by: PHYSICIAN ASSISTANT

## 2020-12-08 PROCEDURE — 250N000013 HC RX MED GY IP 250 OP 250 PS 637: Performed by: PHYSICIAN ASSISTANT

## 2020-12-08 PROCEDURE — 85384 FIBRINOGEN ACTIVITY: CPT | Performed by: PHYSICIAN ASSISTANT

## 2020-12-08 PROCEDURE — 36415 COLL VENOUS BLD VENIPUNCTURE: CPT | Performed by: INTERNAL MEDICINE

## 2020-12-08 PROCEDURE — 84484 ASSAY OF TROPONIN QUANT: CPT | Performed by: PHYSICIAN ASSISTANT

## 2020-12-08 PROCEDURE — 120N000002 HC R&B MED SURG/OB UMMC

## 2020-12-08 RX ORDER — WARFARIN SODIUM 10 MG/1
10 TABLET ORAL
Status: COMPLETED | OUTPATIENT
Start: 2020-12-08 | End: 2020-12-08

## 2020-12-08 RX ADMIN — HYDROMORPHONE HYDROCHLORIDE 0.5 MG: 1 INJECTION, SOLUTION INTRAMUSCULAR; INTRAVENOUS; SUBCUTANEOUS at 08:43

## 2020-12-08 RX ADMIN — QUETIAPINE FUMARATE 100 MG: 100 TABLET ORAL at 21:53

## 2020-12-08 RX ADMIN — SERTRALINE HYDROCHLORIDE 50 MG: 50 TABLET ORAL at 08:44

## 2020-12-08 RX ADMIN — OXYCODONE HYDROCHLORIDE 10 MG: 5 TABLET ORAL at 15:36

## 2020-12-08 RX ADMIN — DEXAMETHASONE 4 MG: 4 TABLET ORAL at 08:43

## 2020-12-08 RX ADMIN — NICOTINE 1 PATCH: 14 PATCH, EXTENDED RELEASE TRANSDERMAL at 08:44

## 2020-12-08 RX ADMIN — DOCUSATE SODIUM AND SENNOSIDES 2 TABLET: 8.6; 5 TABLET ORAL at 08:43

## 2020-12-08 RX ADMIN — OXYCODONE HYDROCHLORIDE 10 MG: 5 TABLET ORAL at 12:40

## 2020-12-08 RX ADMIN — POLYETHYLENE GLYCOL 3350 17 G: 17 POWDER, FOR SOLUTION ORAL at 08:43

## 2020-12-08 RX ADMIN — OXYCODONE HYDROCHLORIDE 10 MG: 5 TABLET ORAL at 08:43

## 2020-12-08 RX ADMIN — HYDROMORPHONE HYDROCHLORIDE 0.5 MG: 1 INJECTION, SOLUTION INTRAMUSCULAR; INTRAVENOUS; SUBCUTANEOUS at 18:39

## 2020-12-08 RX ADMIN — WARFARIN SODIUM 10 MG: 10 TABLET ORAL at 18:40

## 2020-12-08 RX ADMIN — HYDROMORPHONE HYDROCHLORIDE 0.5 MG: 1 INJECTION, SOLUTION INTRAMUSCULAR; INTRAVENOUS; SUBCUTANEOUS at 00:30

## 2020-12-08 RX ADMIN — DEXAMETHASONE 4 MG: 4 TABLET ORAL at 12:40

## 2020-12-08 RX ADMIN — HYDROMORPHONE HYDROCHLORIDE 0.5 MG: 1 INJECTION, SOLUTION INTRAMUSCULAR; INTRAVENOUS; SUBCUTANEOUS at 15:36

## 2020-12-08 RX ADMIN — HEPARIN SODIUM 1200 UNITS/HR: 10000 INJECTION, SOLUTION INTRAVENOUS at 12:42

## 2020-12-08 RX ADMIN — DEXAMETHASONE 4 MG: 4 TABLET ORAL at 00:30

## 2020-12-08 RX ADMIN — METHOCARBAMOL 500 MG: 500 TABLET, FILM COATED ORAL at 20:04

## 2020-12-08 RX ADMIN — OXYCODONE HYDROCHLORIDE 10 MG: 5 TABLET ORAL at 18:39

## 2020-12-08 RX ADMIN — DEXAMETHASONE 4 MG: 4 TABLET ORAL at 18:39

## 2020-12-08 RX ADMIN — OXYCODONE HYDROCHLORIDE 10 MG: 5 TABLET ORAL at 21:53

## 2020-12-08 RX ADMIN — HYDROMORPHONE HYDROCHLORIDE 0.5 MG: 1 INJECTION, SOLUTION INTRAMUSCULAR; INTRAVENOUS; SUBCUTANEOUS at 12:40

## 2020-12-08 RX ADMIN — METHOCARBAMOL 500 MG: 500 TABLET, FILM COATED ORAL at 08:44

## 2020-12-08 RX ADMIN — HYDROMORPHONE HYDROCHLORIDE 0.5 MG: 1 INJECTION, SOLUTION INTRAMUSCULAR; INTRAVENOUS; SUBCUTANEOUS at 21:54

## 2020-12-08 RX ADMIN — HYDROMORPHONE HYDROCHLORIDE 0.5 MG: 1 INJECTION, SOLUTION INTRAMUSCULAR; INTRAVENOUS; SUBCUTANEOUS at 04:48

## 2020-12-08 RX ADMIN — METHOCARBAMOL 500 MG: 500 TABLET, FILM COATED ORAL at 15:36

## 2020-12-08 RX ADMIN — DOCUSATE SODIUM AND SENNOSIDES 2 TABLET: 8.6; 5 TABLET ORAL at 20:04

## 2020-12-08 RX ADMIN — OXYCODONE HYDROCHLORIDE 10 MG: 5 TABLET ORAL at 00:30

## 2020-12-08 RX ADMIN — METHOCARBAMOL 500 MG: 500 TABLET, FILM COATED ORAL at 12:40

## 2020-12-08 RX ADMIN — OXYCODONE HYDROCHLORIDE 10 MG: 5 TABLET ORAL at 04:48

## 2020-12-08 ASSESSMENT — MIFFLIN-ST. JEOR: SCORE: 1835

## 2020-12-08 ASSESSMENT — ACTIVITIES OF DAILY LIVING (ADL)
ADLS_ACUITY_SCORE: 16

## 2020-12-08 NOTE — PLAN OF CARE
Assumed cares [id number]    /84 (BP Location: Left arm)   Pulse 66   Temp 98.6  F (37  C) (Oral)   Resp 16   Ht 1.829 m (6')   Wt 108.3 kg (238 lb 12.1 oz)   LMP 11/21/2020   SpO2 96%   BMI 32.38 kg/m      Pain: Constant back pain, pt reports minimal relief  from pain meds despite getting them around the clock.   Neuro: A&Ox4  Respiratory: Shortness of breath, otherwise WDL  Cardiac/Neurovascular: Intermittently tachycardic  GI/: WDL, pt reporting adequate urine and x2 BM  Nutrition: Regular diet, tolerating well   Activity: Independent   Skin: No concerns   Access: L PIV, infusing heparin at 1,200 units/hour.     Events this shift: Hep 10A recheck was >1.10 so heparin stopped for 1 hour then reduced to 1,200 units/hr, next recheck ordered for 0900. Brain MRI completed this shift. Pt resting in between cares.     Plan: Continue to manage pain as much as possible. Continue to monitor and follow plan of care.

## 2020-12-08 NOTE — PROGRESS NOTES
ONCOLOGY FOLLOW-UP NOTE  12/08/20  11:14 AM      SUBJECTIVE:  She is still having a lot of back pain and it does not seem any different from yesterday    A complete review of systems was performed and was negative with the exception of pertinent positives noted above.    OBJECTIVE DATA  Blood pressure 131/84, pulse 66, temperature 98.6  F (37  C), temperature source Oral, resp. rate 16, height 1.829 m (6'), weight 108.3 kg (238 lb 12.1 oz), last menstrual period 11/21/2020, SpO2 96 %.  -- General: appears uncomfortable and is changing position frequently  -- HEENT: normocephalic, atraumatic  -- Pulmonary: breathing comfortably on room air  -- Neurologic: alert and oriented to self, place, time  -- Psychiatric: appropriate affect, cooperative    This visit was conducted via video due to the COVID-19 pandemic.    I have independently reviewed the new laboratory results and imaging studies.    ASSESSMENT:  1.  Left breast mass and lytic L4 lesion, likely representing breast cancer with metastatic disease  2.  Acute L popliteal DVT with history of DVT  3.  Cancer-related low back pain due to L4 lesion  4.  Kidney mass  5.  Asymptomatic SARS-CoV2 infection    Breast biopsy needs to be scheduled, ideally as inpatient but can do in clinic if this is not possible; will schedule follow up with a breast oncologist the week following the biopsy to discuss results and management.      Will also need follow up with Radiation Oncology for radiation to the L4 lesion once the biopsy is complete.    For pain control, does not seem like dexamethasone is working.  Would ask Palliative Care to see her now with anticipation of need for outpatient follow up.    For the DVT, will likely require long-term anticoagulation given that she has history of DVT and was previously on apixaban but stopped this due to affordability issues.  In cancer-related DVT, best options are DOACs as they have been shown to be more efficacious than warfarin,  with lower bleeding risks.  Warfarin is not the optimal choice, however, it is likely the only affordable medication for her at this time, as she has no prescription drug coverage.  Once coverage is established, we can switch her back to apixaban.    The prescription drug coverage will need to be sorted out with insurance as she will need significant, expensive medical interventions to treat her cancer.    Recommendations:  -- Please consult Palliative Care  -- Please schedule breast biopsy as outpatient unless it will be done while inpatient  -- Agree with transitioning to warfarin; while not the best anticoagulant in cancer patients, it is probably the only affordable choice for her  -- Please obtain renal ultrasound to better evaluate the heterogenous, 3cm lesion in the R kidney to complete staging workup  -- Agree with pelvic ultrasound recommended by Radiation Oncology    Thank you for involving us in the care of Ms. Sanderson.  Oncology consult service will continue to follow with you.  Patient seen and discussed with Dr. Guzman.    Antonella Patel MD  Hematology-Oncology Fellow        ATTENDING PHYSICIAN ADDENDUM AND NOTE:  I evaluated this patient s case separately and also with the oncology fellow. The note above reflects my assessment and plan. I have personally reviewed lab results, and vital and radiology results. >50% of time was spent in assessment and coordination of care; >=35 minutes.  Mrs. Teresa Sanderson is a 45 yr old woman with a complex history, admitted to our hospital with symptomatic COVID-19 infection as well as lower back pain secondary to impingement from a tumor in the L4 region; she also has a 2+cm left breast mass concerning for primary breast carcinoma, and a right lower kidney pole mass also concerning for metastasis to that site in light of hematuria. Social barriers to care include homelessness and lack of access to other resources; she is a primary caregiver for at least one family  member here in the [place] area. She has recently lived in the [place] area taking care of family there as well. She has history of DVT + PE of unclear etiology dating back to 2014; she has been off of anticoagulation due to lack of access to medical care.  Radiation Oncology team recommended considering palliative RT to the spine as an outpatient pending biopsy and histopathologic confirmation of cancer; in the absence of cord compression, that is reasonable. Outpatient biopsy of the left breast mass is pending for 12/17. Mrs. Sanderson has some social barriers to care, but confirms she has a safe place to stay with family in [place] and that she has adequate transportion plans in place to come to the biopsy and subsequent consultation with our breast medical oncology team as an outpatient. Dexamethasone taper can begin prior to discharge with further taper by the outpatient team as needed. I did inform her directly that her disease likely represents stage IV metastatic breast cancer that has spread to the spine and possibly elsewhere, including right kidney (cyst vs met). She stated understanding and was understandably upset at this news; we recommend continued social work follow-up to meet her needs.  Khang Guzman MD, PhD  Associate Professor of Medicine, Hematology, Oncology and Transplantation

## 2020-12-08 NOTE — PROVIDER NOTIFICATION
"Paged Gold cross cover \"Critical lab result Hep 10A >1.1. Any additional intervention outside of protocol?\" 1930 12/7    Response: MD called back, no additional intervention at this time. 1937 12/7  "

## 2020-12-08 NOTE — PLAN OF CARE
Assumed cares 0700  to 1900     BP (!) 135/90 (BP Location: Left arm)   Pulse 61   Temp 96  F (35.6  C) (Axillary)   Resp 18   Ht 1.829 m (6')   Wt 108.3 kg (238 lb 12.1 oz)   LMP 11/21/2020   SpO2 96%   BMI 32.38 kg/m       Pain: Reported in back. Admin oxy and dilaudid ATC.  Neuro: Oriented.  Respiratory: LILI lung sounds d/t PAPR. Pt does report some SOB.  Cardiac/Neurovascular: LILI heart sounds d/t PAPR. HR and pulses WDL. Trace LE edema.  GI/: LILI bowel sounds d/t PAPR. Adequate UOP.  Nutrition: Ate 75% lunch. Working on dinner.  Activity: Up ind.  Skin: No concerns.  Lines: PIV in LUE, infusing hep gtt. Was running at 1600 units/hr, but 10A supratherapeutic, held for 60 min and decreased to 1500 units/hr. Lab drawing 10A recheck now.  Events this shift: Oncology and Radiation Oncology consulted. Pt started on warfarin. Pt just left unit for MRI.     Plan: Cont to monitor. Look @ hep 10A and adjust gtt accordingly.    Margot Porter RN on 12/7/2020 at 6:41 PM

## 2020-12-08 NOTE — PROGRESS NOTES
[facility]     Medicine Progress Note - Hospitalist Service, Gold 8       Date of Admission:  12/5/2020  Assessment & Plan          45-year-old female presents with back pain secondary to metastatic cancer likely breast primary currently in hospital to evaluate for primary malignancy and planning of radiation therapy, also found to have DVT currently on coumadinization process    Plan    Back pain  Breast mass  On dexamethasone, well managed using opioid and NSAID-based regimen awaiting further planning for radiation therapy pending biopsy, MRI of lumbar spine 12/5/2020 showed focal lytic lesion on L4 level, MRI brain negative for brain disease    Left popliteal vein DVT  Currently undergoing coumadinization process with heparin cover, cannot afford apixaban due to no insurance    Covid positivity  Patient tested positive for novel coronavirus 12/5/2020, currently not on oxygen or having respiratory difficulty    Depression  Currently no active symptoms, continue with PTA quetiapine and sertraline    Nicotine dependence  Continue with nicotine patch, counseled patient about smoking cessation         Diet: Regular Diet Adult    DVT Prophylaxis: Heparin drip  Crisostomo Catheter: not present  Code Status: Full Code           Disposition Plan   Expected discharge: 2 - 3 days, recommended to prior living arrangement once biopsy done and plan for radiation established.  Entered: Clyde Garcia MD 12/08/2020, 10:58 AM       The patient's care was discussed with the Patient.    Clyde Garcia MD  Hospitalist Service, [address]   Contact information available via [facility] Paging/Directory  Please see sign in/sign out for up to date coverage information  ______________________________________________________________________    Interval History   No overnight events, Patients pain better controlled this am. Awaiting breast biopsy today  for planned back radiation.No new chest pain, abdominal discomfort, difficulty making urine or malaise.     Data reviewed today: I reviewed all medications, new labs and imaging results over the last 24 hours. I personally reviewed no images or EKG's today.    Physical Exam   Vital Signs: Temp: 98.6  F (37  C) Temp src: Oral BP: 131/84 Pulse: 66   Resp: 16 SpO2: 96 % O2 Device: None (Room air)    Weight: 238 lbs 12.13 oz  Constitutional: awake, alert, cooperative, no apparent distress, and appears stated age  Eyes: Lids and lashes normal, pupils equal, round and reactive to light, extra ocular muscles intact, sclera clear, conjunctiva normal  ENT: Normocephalic, without obvious abnormality, atraumatic, sinuses nontender on palpation, external ears without lesions, oral pharynx with moist mucous membranes, tonsils without erythema or exudates, gums normal and good dentition.  Hematologic / Lymphatic: no cervical lymphadenopathy  Respiratory: No increased work of breathing, good air exchange, clear to auscultation bilaterally, no crackles or wheezing  Cardiovascular: Normal apical impulse, regular rate and rhythm, normal S1 and S2, no S3 or S4, and no murmur noted  GI: No scars, normal bowel sounds, soft, non-distended, non-tender, no masses palpated, no hepatosplenomegally  Genitounirinary:   Skin: no bruising or bleeding  Musculoskeletal: There is no redness, warmth, or swelling of the joints.  Full range of motion noted.  Motor strength is 5 out of 5 all extremities bilaterally.  Tone is normal.  Neurologic: Awake, alert, oriented to name, place and time.  Cranial nerves II-XII are grossly intact.  Motor is 5 out of 5 bilaterally.  Cerebellar finger to nose, heel to shin intact.  Sensory is intact.  Babinski down going, Romberg negative, and gait is normal.  Neuropsychiatric: General: normal, calm and normal eye contact    Data   Recent Labs   Lab 12/08/20  0537 12/07/20  0330 12/06/20  1423 12/06/20  0703  12/05/20  1921 12/05/20  0934 12/05/20  0934   WBC 7.6 6.5 6.2 6.2  --   --  7.1   HGB 11.7 10.2* 10.2* 10.5*  --   --  12.1   MCV 89 90 88 91  --   --  88    206 203 199  --   --  229   INR 1.11 1.16*  --  1.11 1.13   < >  --     140  --  139  --   --  139   POTASSIUM 4.4 4.3  --  4.2  --   --  4.6   CHLORIDE 104 109  --  108  --   --  109   CO2 26 26  --  25  --   --  27   BUN 9 10  --  10  --   --  7   CR 0.74 0.73  --  0.81  --   --  0.59   ANIONGAP 5 5  --  6  --   --  3   NANDO 9.7 9.2  --  9.1  --   --  8.8   * 103*  --  89  --   --  107*   ALBUMIN 3.1*  --   --   --   --   --  3.3*   PROTTOTAL 7.8  --   --   --   --   --  8.2   BILITOTAL 0.2  --   --   --   --   --  0.5   ALKPHOS 116  --   --   --   --   --  127   ALT 24  --   --   --   --   --  18   AST 15  --   --   --   --   --  21   TROPI <0.015  --   --  <0.015 <0.015  --   --     < > = values in this interval not displayed.     Recent Results (from the past 24 hour(s))   MR Brain w/o & w Contrast    Narrative     MR BRAIN W/O & W CONTRAST 12/7/2020 7:47 PM    Provided History: suspected metastatic breast cancer.  ICD-10:    Comparison: None.    Technique: Multiplanar T1-weighted, axial FLAIR, and susceptibility  images were obtained without intravenous contrast. Following  intravenous gadolinium-based contrast administration, axial  T2-weighted, diffusion, and T1-weighted images (in multiple planes)  were obtained.    Contrast: 10 mL Gadavist    Findings:  There is no mass effect, midline shift,  or evidence of intracranial  hemorrhage.  The ventricles are proportionate to the cerebral sulci.  Normal major vascular intracranial flow-voids.    Postcontrast images demonstrate no abnormal intracranial enhancement.     No abnormality of the skull marrow signal. The orbits are grossly  unremarkable.    The visualized portions of paranasal sinuses are relatively clear. The  visualized portions of mastoid air cells are unremarkable.         Impression    Impression: Normal brain MRI.    I have personally reviewed the examination and initial interpretation  and I agree with the findings.    DAVID JAIMES MD     Medications     heparin 1,200 Units/hr (12/08/20 0954)     - MEDICATION INSTRUCTIONS -       Warfarin Therapy Reminder         dexamethasone  4 mg Oral Q6H MAICO     methocarbamol  500 mg Oral 4x Daily     nicotine  1 patch Transdermal Daily     nicotine   Transdermal Q8H     polyethylene glycol  17 g Oral Daily     QUEtiapine  100 mg Oral At Bedtime     senna-docusate  1 tablet Oral BID    Or     senna-docusate  2 tablet Oral BID     sertraline  50 mg Oral Daily     sodium chloride (PF)  3 mL Intracatheter Q8H     warfarin ANTICOAGULANT  10 mg Oral ONCE at 18:00

## 2020-12-08 NOTE — PROGRESS NOTES
Care Management Follow Up    Additional Information:  Patient's insurance confirmed as active. Per pharmacy, copay for Xarelto is $3/month, primary provider updated.     Bertha Villeda, RNCC, BSN    [facility]  [address] [place] [id number]    [email or web address]    Office: [phone] Pager: [phone]  To contact the weekend RNCC, page [phone].

## 2020-12-09 ENCOUNTER — APPOINTMENT (OUTPATIENT)
Dept: GENERAL RADIOLOGY | Facility: CLINIC | Age: 45
End: 2020-12-09
Attending: STUDENT IN AN ORGANIZED HEALTH CARE EDUCATION/TRAINING PROGRAM
Payer: MEDICAID

## 2020-12-09 LAB
ALBUMIN SERPL-MCNC: 3.2 G/DL (ref 3.4–5)
ALBUMIN UR-MCNC: NEGATIVE MG/DL
ALP SERPL-CCNC: 116 U/L (ref 40–150)
ALT SERPL W P-5'-P-CCNC: 23 U/L (ref 0–50)
ANION GAP SERPL CALCULATED.3IONS-SCNC: 3 MMOL/L (ref 3–14)
APPEARANCE UR: CLEAR
AST SERPL W P-5'-P-CCNC: 17 U/L (ref 0–45)
BACTERIA #/AREA URNS HPF: ABNORMAL /HPF
BASOPHILS # BLD AUTO: 0 10E9/L (ref 0–0.2)
BASOPHILS NFR BLD AUTO: 0.1 %
BILIRUB SERPL-MCNC: 0.1 MG/DL (ref 0.2–1.3)
BILIRUB UR QL STRIP: NEGATIVE
BUN SERPL-MCNC: 8 MG/DL (ref 7–30)
CALCIUM SERPL-MCNC: 9.6 MG/DL (ref 8.5–10.1)
CHLORIDE SERPL-SCNC: 105 MMOL/L (ref 94–109)
CO2 SERPL-SCNC: 29 MMOL/L (ref 20–32)
COLOR UR AUTO: YELLOW
CREAT SERPL-MCNC: 0.69 MG/DL (ref 0.52–1.04)
CRP SERPL-MCNC: 21 MG/L (ref 0–8)
D DIMER PPP FEU-MCNC: 1 UG/ML FEU (ref 0–0.5)
DIFFERENTIAL METHOD BLD: ABNORMAL
EOSINOPHIL # BLD AUTO: 0 10E9/L (ref 0–0.7)
EOSINOPHIL NFR BLD AUTO: 0 %
ERYTHROCYTE [DISTWIDTH] IN BLOOD BY AUTOMATED COUNT: 13.4 % (ref 10–15)
FIBRINOGEN PPP-MCNC: 546 MG/DL (ref 200–420)
GFR SERPL CREATININE-BSD FRML MDRD: >90 ML/MIN/{1.73_M2}
GLUCOSE SERPL-MCNC: 120 MG/DL (ref 70–99)
GLUCOSE UR STRIP-MCNC: NEGATIVE MG/DL
HCT VFR BLD AUTO: 37.2 % (ref 35–47)
HGB BLD-MCNC: 11.7 G/DL (ref 11.7–15.7)
HGB UR QL STRIP: NEGATIVE
IMM GRANULOCYTES # BLD: 0.1 10E9/L (ref 0–0.4)
IMM GRANULOCYTES NFR BLD: 0.7 %
INR PPP: 1.16 (ref 0.86–1.14)
KETONES UR STRIP-MCNC: NEGATIVE MG/DL
LDH SERPL L TO P-CCNC: 184 U/L (ref 81–234)
LEUKOCYTE ESTERASE UR QL STRIP: NEGATIVE
LYMPHOCYTES # BLD AUTO: 1.9 10E9/L (ref 0.8–5.3)
LYMPHOCYTES NFR BLD AUTO: 15.3 %
MCH RBC QN AUTO: 27.7 PG (ref 26.5–33)
MCHC RBC AUTO-ENTMCNC: 31.5 G/DL (ref 31.5–36.5)
MCV RBC AUTO: 88 FL (ref 78–100)
MONOCYTES # BLD AUTO: 0.8 10E9/L (ref 0–1.3)
MONOCYTES NFR BLD AUTO: 6.6 %
NEUTROPHILS # BLD AUTO: 9.5 10E9/L (ref 1.6–8.3)
NEUTROPHILS NFR BLD AUTO: 77.3 %
NITRATE UR QL: NEGATIVE
NRBC # BLD AUTO: 0 10*3/UL
NRBC BLD AUTO-RTO: 0 /100
PH UR STRIP: 6.5 PH (ref 5–7)
PLATELET # BLD AUTO: 245 10E9/L (ref 150–450)
POTASSIUM SERPL-SCNC: 4.5 MMOL/L (ref 3.4–5.3)
PROT SERPL-MCNC: 7.9 G/DL (ref 6.8–8.8)
RBC # BLD AUTO: 4.23 10E12/L (ref 3.8–5.2)
RBC #/AREA URNS AUTO: 4 /HPF (ref 0–2)
RETICS # AUTO: 79.5 10E9/L (ref 25–95)
RETICS/RBC NFR AUTO: 1.9 % (ref 0.5–2)
SODIUM SERPL-SCNC: 137 MMOL/L (ref 133–144)
SOURCE: ABNORMAL
SP GR UR STRIP: 1.02 (ref 1–1.03)
UFH PPP CHRO-ACNC: 0.49 IU/ML
UROBILINOGEN UR STRIP-MCNC: 2 MG/DL (ref 0–2)
WBC # BLD AUTO: 12.2 10E9/L (ref 4–11)
WBC #/AREA URNS AUTO: 2 /HPF (ref 0–5)

## 2020-12-09 PROCEDURE — 99232 SBSQ HOSP IP/OBS MODERATE 35: CPT | Performed by: INTERNAL MEDICINE

## 2020-12-09 PROCEDURE — 250N000012 HC RX MED GY IP 250 OP 636 PS 637: Performed by: INTERNAL MEDICINE

## 2020-12-09 PROCEDURE — 71045 X-RAY EXAM CHEST 1 VIEW: CPT

## 2020-12-09 PROCEDURE — 85384 FIBRINOGEN ACTIVITY: CPT | Performed by: PHYSICIAN ASSISTANT

## 2020-12-09 PROCEDURE — 36415 COLL VENOUS BLD VENIPUNCTURE: CPT | Performed by: PHYSICIAN ASSISTANT

## 2020-12-09 PROCEDURE — 81001 URINALYSIS AUTO W/SCOPE: CPT | Performed by: STUDENT IN AN ORGANIZED HEALTH CARE EDUCATION/TRAINING PROGRAM

## 2020-12-09 PROCEDURE — 250N000013 HC RX MED GY IP 250 OP 250 PS 637: Performed by: HOSPITALIST

## 2020-12-09 PROCEDURE — 71045 X-RAY EXAM CHEST 1 VIEW: CPT | Mod: 26 | Performed by: RADIOLOGY

## 2020-12-09 PROCEDURE — 86140 C-REACTIVE PROTEIN: CPT | Performed by: PHYSICIAN ASSISTANT

## 2020-12-09 PROCEDURE — 85520 HEPARIN ASSAY: CPT | Performed by: PHYSICIAN ASSISTANT

## 2020-12-09 PROCEDURE — 85379 FIBRIN DEGRADATION QUANT: CPT | Performed by: PHYSICIAN ASSISTANT

## 2020-12-09 PROCEDURE — 250N000013 HC RX MED GY IP 250 OP 250 PS 637: Performed by: STUDENT IN AN ORGANIZED HEALTH CARE EDUCATION/TRAINING PROGRAM

## 2020-12-09 PROCEDURE — 90832 PSYTX W PT 30 MINUTES: CPT | Mod: HN | Performed by: STUDENT IN AN ORGANIZED HEALTH CARE EDUCATION/TRAINING PROGRAM

## 2020-12-09 PROCEDURE — 250N000013 HC RX MED GY IP 250 OP 250 PS 637: Performed by: INTERNAL MEDICINE

## 2020-12-09 PROCEDURE — 250N000011 HC RX IP 250 OP 636: Performed by: PHYSICIAN ASSISTANT

## 2020-12-09 PROCEDURE — 80053 COMPREHEN METABOLIC PANEL: CPT | Performed by: PHYSICIAN ASSISTANT

## 2020-12-09 PROCEDURE — 85610 PROTHROMBIN TIME: CPT | Performed by: PHYSICIAN ASSISTANT

## 2020-12-09 PROCEDURE — 85025 COMPLETE CBC W/AUTO DIFF WBC: CPT | Performed by: PHYSICIAN ASSISTANT

## 2020-12-09 PROCEDURE — 83615 LACTATE (LD) (LDH) ENZYME: CPT | Performed by: PHYSICIAN ASSISTANT

## 2020-12-09 PROCEDURE — 250N000013 HC RX MED GY IP 250 OP 250 PS 637: Performed by: NURSE PRACTITIONER

## 2020-12-09 PROCEDURE — 85045 AUTOMATED RETICULOCYTE COUNT: CPT | Performed by: PHYSICIAN ASSISTANT

## 2020-12-09 PROCEDURE — 120N000002 HC R&B MED SURG/OB UMMC

## 2020-12-09 PROCEDURE — 250N000011 HC RX IP 250 OP 636: Performed by: INTERNAL MEDICINE

## 2020-12-09 PROCEDURE — 99233 SBSQ HOSP IP/OBS HIGH 50: CPT | Performed by: STUDENT IN AN ORGANIZED HEALTH CARE EDUCATION/TRAINING PROGRAM

## 2020-12-09 PROCEDURE — 250N000013 HC RX MED GY IP 250 OP 250 PS 637: Performed by: PHYSICIAN ASSISTANT

## 2020-12-09 RX ORDER — OXYCODONE HYDROCHLORIDE 5 MG/1
5 TABLET ORAL EVERY 4 HOURS PRN
Status: DISCONTINUED | OUTPATIENT
Start: 2020-12-09 | End: 2020-12-09

## 2020-12-09 RX ORDER — OXYCODONE HYDROCHLORIDE 5 MG/1
5 TABLET ORAL EVERY 8 HOURS PRN
Status: DISCONTINUED | OUTPATIENT
Start: 2020-12-09 | End: 2020-12-10 | Stop reason: HOSPADM

## 2020-12-09 RX ORDER — POLYETHYLENE GLYCOL 3350 17 G/17G
17 POWDER, FOR SOLUTION ORAL DAILY
Status: DISCONTINUED | OUTPATIENT
Start: 2020-12-09 | End: 2020-12-09

## 2020-12-09 RX ORDER — LIDOCAINE 4 G/G
1-2 PATCH TOPICAL
Status: DISCONTINUED | OUTPATIENT
Start: 2020-12-09 | End: 2020-12-10 | Stop reason: HOSPADM

## 2020-12-09 RX ORDER — PANTOPRAZOLE SODIUM 40 MG/1
40 TABLET, DELAYED RELEASE ORAL
Status: DISCONTINUED | OUTPATIENT
Start: 2020-12-10 | End: 2020-12-10 | Stop reason: HOSPADM

## 2020-12-09 RX ORDER — OXYCODONE HCL 10 MG/1
10 TABLET, FILM COATED, EXTENDED RELEASE ORAL EVERY 12 HOURS
Status: DISCONTINUED | OUTPATIENT
Start: 2020-12-09 | End: 2020-12-10 | Stop reason: HOSPADM

## 2020-12-09 RX ORDER — OXYCODONE HYDROCHLORIDE 5 MG/1
5 TABLET ORAL EVERY 6 HOURS PRN
Status: DISCONTINUED | OUTPATIENT
Start: 2020-12-09 | End: 2020-12-09

## 2020-12-09 RX ADMIN — OXYCODONE HYDROCHLORIDE 10 MG: 10 TABLET, FILM COATED, EXTENDED RELEASE ORAL at 15:07

## 2020-12-09 RX ADMIN — METHOCARBAMOL 500 MG: 500 TABLET, FILM COATED ORAL at 11:53

## 2020-12-09 RX ADMIN — METHOCARBAMOL 500 MG: 500 TABLET, FILM COATED ORAL at 15:07

## 2020-12-09 RX ADMIN — SERTRALINE HYDROCHLORIDE 50 MG: 50 TABLET ORAL at 08:00

## 2020-12-09 RX ADMIN — HYDROMORPHONE HYDROCHLORIDE 0.5 MG: 1 INJECTION, SOLUTION INTRAMUSCULAR; INTRAVENOUS; SUBCUTANEOUS at 07:59

## 2020-12-09 RX ADMIN — OXYCODONE HYDROCHLORIDE 10 MG: 5 TABLET ORAL at 11:53

## 2020-12-09 RX ADMIN — DEXAMETHASONE 4 MG: 4 TABLET ORAL at 04:55

## 2020-12-09 RX ADMIN — RIVAROXABAN 15 MG: 15 TABLET, FILM COATED ORAL at 11:53

## 2020-12-09 RX ADMIN — METHOCARBAMOL 500 MG: 500 TABLET, FILM COATED ORAL at 21:36

## 2020-12-09 RX ADMIN — OXYCODONE HYDROCHLORIDE 10 MG: 5 TABLET ORAL at 04:55

## 2020-12-09 RX ADMIN — OXYCODONE HYDROCHLORIDE 5 MG: 5 TABLET ORAL at 21:36

## 2020-12-09 RX ADMIN — QUETIAPINE FUMARATE 100 MG: 100 TABLET ORAL at 21:36

## 2020-12-09 RX ADMIN — BISACODYL 10 MG: 10 SUPPOSITORY RECTAL at 11:58

## 2020-12-09 RX ADMIN — HYDROMORPHONE HYDROCHLORIDE 0.5 MG: 1 INJECTION, SOLUTION INTRAMUSCULAR; INTRAVENOUS; SUBCUTANEOUS at 01:28

## 2020-12-09 RX ADMIN — HYDROMORPHONE HYDROCHLORIDE 0.5 MG: 1 INJECTION, SOLUTION INTRAMUSCULAR; INTRAVENOUS; SUBCUTANEOUS at 11:15

## 2020-12-09 RX ADMIN — DEXAMETHASONE 4 MG: 4 TABLET ORAL at 18:44

## 2020-12-09 RX ADMIN — DOCUSATE SODIUM AND SENNOSIDES 2 TABLET: 8.6; 5 TABLET ORAL at 08:01

## 2020-12-09 RX ADMIN — DEXAMETHASONE 4 MG: 4 TABLET ORAL at 11:54

## 2020-12-09 RX ADMIN — RIVAROXABAN 15 MG: 15 TABLET, FILM COATED ORAL at 18:44

## 2020-12-09 RX ADMIN — OXYCODONE HYDROCHLORIDE 10 MG: 5 TABLET ORAL at 01:28

## 2020-12-09 RX ADMIN — HYDROMORPHONE HYDROCHLORIDE 0.5 MG: 1 INJECTION, SOLUTION INTRAMUSCULAR; INTRAVENOUS; SUBCUTANEOUS at 04:55

## 2020-12-09 RX ADMIN — POLYETHYLENE GLYCOL 3350 17 G: 17 POWDER, FOR SOLUTION ORAL at 08:01

## 2020-12-09 RX ADMIN — POLYETHYLENE GLYCOL 3350 17 G: 17 POWDER, FOR SOLUTION ORAL at 18:44

## 2020-12-09 RX ADMIN — NICOTINE 1 PATCH: 14 PATCH, EXTENDED RELEASE TRANSDERMAL at 08:00

## 2020-12-09 RX ADMIN — HEPARIN SODIUM 1200 UNITS/HR: 10000 INJECTION, SOLUTION INTRAVENOUS at 04:55

## 2020-12-09 RX ADMIN — ACETAMINOPHEN 325 MG: 325 TABLET, FILM COATED ORAL at 18:44

## 2020-12-09 RX ADMIN — DEXAMETHASONE 4 MG: 4 TABLET ORAL at 00:05

## 2020-12-09 RX ADMIN — OXYCODONE HYDROCHLORIDE 10 MG: 5 TABLET ORAL at 08:00

## 2020-12-09 RX ADMIN — DOCUSATE SODIUM AND SENNOSIDES 2 TABLET: 8.6; 5 TABLET ORAL at 21:36

## 2020-12-09 RX ADMIN — METHOCARBAMOL 500 MG: 500 TABLET, FILM COATED ORAL at 08:00

## 2020-12-09 RX ADMIN — MELATONIN TAB 3 MG 3 MG: 3 TAB at 21:36

## 2020-12-09 ASSESSMENT — ACTIVITIES OF DAILY LIVING (ADL)
ADLS_ACUITY_SCORE: 16

## 2020-12-09 ASSESSMENT — MIFFLIN-ST. JEOR: SCORE: 1827

## 2020-12-09 NOTE — TELEPHONE ENCOUNTER
ONCOLOGY INTAKE: Records Information      APPT INFORMATION:  Referring provider:  Viry Reyes PA-C  Referring provider s clinic:  [facility]  Reason for visit/diagnosis:  Breast Mass  Has patient been notified of appointment date and time?: Yes    RECORDS INFORMATION:  Were the records received with the referral (via [employer])? No    Has patient been seen for any external appt for this diagnosis? No    If yes, where? N/a    Has patient had any imaging or procedures outside of [place] for this condition? No      If Yes, where? N//a    ADDITIONAL INFORMATION:  None

## 2020-12-09 NOTE — PROGRESS NOTES
[facility]     Medicine Progress Note - Hospitalist Service, Gold 8       Date of Admission:  12/5/2020  Assessment & Plan                45-year-old female presents with back pain secondary to metastatic cancer likely breast primary currently in hospital to evaluate for primary malignancy and planning of radiation therapy, also found to have DVT     Plan    White count   Likely from steroid use, Will rule out acute infectious process as well with chest x ray and urinalysis     Back pain  Breast mass  On dexamethasone, well managed using opioid and NSAID-based regimen awaiting further planning for radiation therapy pending biopsy, MRI of lumbar spine 12/5/2020 showed focal lytic lesion on L4 level, MRI brain negative for brain disease    Biopsy scheduled for 12/17/2020 as outpatient     Left popliteal vein DVT  Stopped heparin and switched to rivaroxaban     Covid positivity  Patient tested positive for novel coronavirus 12/5/2020, currently not on oxygen or having respiratory difficulty    Depression  Currently no active symptoms, continue with PTA quetiapine and sertraline    Nicotine dependence  Continue with nicotine patch, counseled patient about smoking cessation           Diet: Regular Diet Adult    DVT Prophylaxis: rivaroxaban   Crisostomo Catheter: not present  Code Status: Full Code           Disposition Plan   Expected discharge: 2 - 3 days, recommended to prior living arrangement once adequate pain management/ tolerating PO medications.  Entered: Clyde Garcia MD 12/09/2020, 2:02 PM       The patient's care was discussed with the Care Coordinator/ and Patient.    Clyde Garcia MD  Hospitalist Service, [address]   Contact information available via [facility] Paging/Directory  Please see sign in/sign out for up to date coverage  information  ______________________________________________________________________    Interval History   No overnight events, Patients reports better pain control, No reported chest pain, no abdominal pain, difficulty making urine or any fevers today     Switched her iv to po pain regiments, planned for discharge tomorrow     Data reviewed today: I reviewed all medications, new labs and imaging results over the last 24 hours. I personally reviewed no images or EKG's today.    Physical Exam   Vital Signs: Temp: 98.4  F (36.9  C) Temp src: Oral BP: 127/65 Pulse: 56   Resp: 16 SpO2: 99 % O2 Device: None (Room air)    Weight: 237 lbs 10.49 oz  Constitutional: awake, alert, cooperative, no apparent distress, and appears stated age  Eyes: Lids and lashes normal, pupils equal, round and reactive to light, extra ocular muscles intact, sclera clear, conjunctiva normal  ENT: Normocephalic, without obvious abnormality, atraumatic, sinuses nontender on palpation, external ears without lesions, oral pharynx with moist mucous membranes, tonsils without erythema or exudates, gums normal and good dentition.  Hematologic / Lymphatic: no cervical lymphadenopathy  Respiratory: No increased work of breathing, good air exchange, clear to auscultation bilaterally, no crackles or wheezing  Cardiovascular: Normal apical impulse, regular rate and rhythm, normal S1 and S2, no S3 or S4, and no murmur noted  GI: No scars, normal bowel sounds, soft, non-distended, non-tender, no masses palpated, no hepatosplenomegally  Genitounirinary:   Skin: no bruising or bleeding  Musculoskeletal: There is no redness, warmth, or swelling of the joints.  Full range of motion noted.  Motor strength is 5 out of 5 all extremities bilaterally.  Tone is normal.  Neurologic: Awake, alert, oriented to name, place and time.  Cranial nerves II-XII are grossly intact.  Motor is 5 out of 5 bilaterally.  Cerebellar finger to nose, heel to shin intact.  Sensory is  intact.  Babinski down going, Romberg negative, and gait is normal.  Neuropsychiatric: General: normal, calm and normal eye contact    Data   Recent Labs   Lab 12/09/20  0600 12/08/20  0537 12/07/20  0330 12/06/20  0703 12/06/20  0703 12/05/20  1921   WBC 12.2* 7.6 6.5   < > 6.2  --    HGB 11.7 11.7 10.2*   < > 10.5*  --    MCV 88 89 90   < > 91  --     243 206   < > 199  --    INR 1.16* 1.11 1.16*  --  1.11 1.13    136 140  --  139  --    POTASSIUM 4.5 4.4 4.3  --  4.2  --    CHLORIDE 105 104 109  --  108  --    CO2 29 26 26  --  25  --    BUN 8 9 10  --  10  --    CR 0.69 0.74 0.73  --  0.81  --    ANIONGAP 3 5 5  --  6  --    NANDO 9.6 9.7 9.2  --  9.1  --    * 140* 103*  --  89  --    ALBUMIN 3.2* 3.1*  --   --   --   --    PROTTOTAL 7.9 7.8  --   --   --   --    BILITOTAL 0.1* 0.2  --   --   --   --    ALKPHOS 116 116  --   --   --   --    ALT 23 24  --   --   --   --    AST 17 15  --   --   --   --    TROPI  --  <0.015  --   --  <0.015 <0.015    < > = values in this interval not displayed.     No results found for this or any previous visit (from the past 24 hour(s)).  Medications     - MEDICATION INSTRUCTIONS -       Warfarin Therapy Reminder         dexamethasone  4 mg Oral Q6H MAICO     methocarbamol  500 mg Oral 4x Daily     nicotine  1 patch Transdermal Daily     nicotine   Transdermal Q8H     oxyCODONE  10 mg Oral Q12H     [START ON 12/10/2020] pantoprazole  40 mg Oral QAM AC     polyethylene glycol  17 g Oral Daily     QUEtiapine  100 mg Oral At Bedtime     rivaroxaban ANTICOAGULANT  15 mg Oral BID w/meals     senna-docusate  1 tablet Oral BID    Or     senna-docusate  2 tablet Oral BID     sertraline  50 mg Oral Daily     sodium chloride (PF)  3 mL Intracatheter Q8H

## 2020-12-09 NOTE — CONSULTS
"IR consulted for breast mass biopsy    Please see original consult from IR on  (Dr. Corrales). IR providers do not complete breast mass biopsies. This needs to be done by the appropriately credentialed breast radiologist.     \"Breast mass needs to be worked up at the outpatient breast center, which would likely include mammography and/or US evaluation prior to biopsy by breast specialists. Please make outpatient appointment with [facility] (Dan: [phone]) or [facility] (central [phone]). Please include symptoms (lump, pain, nipple discharge, etc.), laterality, breast quadrant. Place Epic order for Bilateral Diagnostic Mammogram and Bilateral Breast Ultrasound. Additionally, please make follow-up appointment with primary care.\"    Page out to Dr. Garcia to discuss.    Cheryl Hebert DNP, APRN  Interventional Radiology   IR on-call pager: [phone]     "

## 2020-12-09 NOTE — CONSULTS
.[facility] Psychology                   Clinic    Department of Medicine  Lianne Nava, Ph.D., L.P. [phone]                        Clinics and Surgery Center  [facility] Makenna Marie, Ph.D., L.P. [phone]                 [facility] Mail Code 474   Shryea Acevedo, Ph.D.  [phone]     [address] Wilfredo Cruz, Ph.D.,  L.P. [phone]      [place]   [address]  [place] [address]           Medhat Ortiz, Ph.D. [phone]      Micheline Castillo, Ph.D., L.P. [phone]    Darius Heredia, Ph.D., A.B.P.P., L.P. [phone]     Gunjan Levy, Ph.D., L.P. [phone]    Inpatient Health Psychology Consultation - Telephone Visit    This telehealth service is appropriate and effective for delivering services in light of the necessity for social distancing to mitigate the COVID-19 epidemic and for conservation of PPE.     Patient has agreed to receiving telehealth services after being informed about it: Yes    Time service started: 5:05  Time service ended: 5:25    Mode of transmission: Telephone    Location of originating:  Hospital room    Distance site:  Home office of provider for [facility]    The patient has been notified that:  Video/Telephone visits will be conducted via a call with their psychologist to provide the care they need with a video conversation. Video/Telephone visits may be billed at different rates depending on insurance coverage.  Patients are advised to please contact their insurance provider with any questions about their health insurance coverage. If during the course of a call the psychologist feels a video/telephone visit is not appropriate, patients will not be charged for this service.      Date of Service:  12/9/20    BACKGROUND:  Per EMR: Teresa Sanderson is a 45 year old female with pyleonephritis, DVT, left breast mass who was admitted to [facility]  Center  on 12/5/2020 for evaluation of  severe L>R back pain, hematuria, loss of appetiteand found to be COVID-19 positive. Ongoing work-up for breast mass and lytic oseous lesions in spine.     Health Psychology initially consulted to support patient in coping with likely cancer diagnosis. Psychiatry met with patient 12/6/20 as well as spiritual health.  See notes for additional background and evaluation.     SUBJECTIVE:  Touched base with Ms. Sanderson today to assess emotional reactions to news about likely cancer diagnosis.  She said her mood was lower because of the news she has received.  Not sleeping well either, even with Seroquel. She also reported decreased appetite which she attributes to stress. She wonders if her medical team should increase Seroquel dose. Provided validation for her emotional experiences.     She said she has been trying to cope by speaking with friends and family on the phone. She has been sharing the news and asking for prayer. She acknowledged trying to avoid searching for information online about her health because it makes her more fearful. Discussed pros and cons of sharing information.  Reviewed how to seek support effectively and utilize support as it is helpful for her.     Discussed what she has learned about her possible prognosis.  She said she has been told that whatever kind of cancer or mass she has, it's spreading fast. She continues to blame herself for not seeking care sooner and questioning God about why she is going through this crisis.     She is discharging tomorrow likely. Discussed her plans for attending appointments and strategies to find childcare when she has appointments.  She also expressed interest in continuing speaking and engaging in psychotherapy as she pursues treatment.     Ms. Sanderson was engaged throughout the visit and expressed appreciation for phone call.     OBJECTIVE:  Spoke with Ms. Sanderson via telephone. She reported sad mood, lower than  previous phone call. Tearful at times during phone call. Thought processes logical and linear.  Insight and judgment good. Speech WNL. Denied suicidal ideation.        ASSESSMENT:  Ms. Sanderson has a history of schizoaffective disorder and depression. She reports symptoms of depression and anxiety in response to health issues/hospitalization. She has various strengths and sources of support that will help her navigate upcoming challenges, but she also has multiple complex psychosocial stressors that are straining her coping skills.       DIAGNOSIS:  Adjustment disorder with mixed anxiety and depressed mood  History of schizoaffective disorder        PLAN:  Will contact Ms. Sanderson after she discharges to assess ongoing psychological needs and schedule outpatient visit as indicated.     Diana Meléndez, PhD,   Health Psychology Fellow  Phone: [phone]

## 2020-12-09 NOTE — PROGRESS NOTES
Care Management Follow Up    Additional Information:  Per chart review, provider is starting patient on warfarin due to lack of insurance. And additional page was sent to the primary provider at this time, patient DOES have active insurance, call back requested to confirm information was received.     Per discussion with the pharmacy liaison, patient would have a $3/month copay for Rivaroxaban. Per heme/onc recs, wafarin is not the optimal choice and patient DOES have coverage for rivaroxaban if appropriate. Per the pharmacy liaison, [place], [place] Medicaid [place]'t cover apixaban unless there's trial/failure or contraindication to xarelto, pradaxa, warfarin, and enoxaparin. Unit pharmacist updated as well.     Bertha Villeda, RNCC, BSN    [facility]    Medicine Group  [address] [place] [id number]    [email or web address]    Office: [phone] Pager: [phone]  To contact the weekend RNCC, page [phone].
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
## 2020-12-09 NOTE — PLAN OF CARE
Assumed cares [id number]    BP (!) 146/77 (BP Location: Left arm)   Pulse 65   Temp 97.5  F (36.4  C) (Oral)   Resp 16   Ht 1.829 m (6')   Wt 107.8 kg (237 lb 10.5 oz)   LMP 11/21/2020   SpO2 97%   BMI 32.23 kg/m      Pain: Constant pain in back, current pain regimen not helping much with the pain at this point, pt unable to sleep because of it.   Neuro: A&Ox4  Respiratory: WDL  Cardiac/Neurovascular: WDL  GI/: Per pt, one BM and multiple urination  Nutrition: Regular diet, tolerating well  Activity: Independent  Skin: No concerns  Access: L PIV, infusing heparin at 1,200 units/hr, waiting for Hep 10 A to come back and will adjust appropriately.     Events this shift: Gave dilaudid and oxy q3h with little to no effect on pain. Pt unable to sleep because of the pain.    Plan: Continue to monitor and follow plan of care. Consider adjusting pain management plan to better address the pt's pain.
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
## 2020-12-09 NOTE — PLAN OF CARE
Assumed cares 0700  to 1900     BP (!) 145/76 (BP Location: Left arm)   Pulse 70   Temp 97.1  F (36.2  C) (Oral)   Resp 18   Ht 1.829 m (6')   Wt 108.3 kg (238 lb 12.1 oz)   LMP 11/21/2020   SpO2 96%   BMI 32.38 kg/m       Pain: Reported in back. Admin oxy and dilaudid ATC.  Neuro: Oriented.  Respiratory: LILI lung sounds d/t PAPR. Pt does report some SOB.  Cardiac/Neurovascular: LILI heart sounds d/t PAPR. HR and pulses WDL. Trace LE edema.  GI/: LILI bowel sounds d/t PAPR. Adequate UOP.  Nutrition: Good intake.  Activity: Up ind.  Skin: No concerns.  Lines: PIV in LUE, infusing hep gtt. Gtt infusing @ 1200/hr. 10A therapeutic today @ 0.52, recheck 10A next tomorrow AM.     Plan: Cont to monitor.     Margot Porter RN on 12/8/2020 at 6:20 PM

## 2020-12-09 NOTE — PLAN OF CARE
BP (!) 145/70 (BP Location: Left arm)   Pulse 68   Temp 97.9  F (36.6  C) (Oral)   Resp 16   Ht 1.829 m (6')   Wt 107.8 kg (237 lb 10.5 oz)   LMP 11/21/2020   SpO2 99%   BMI 32.23 kg/m      Care from: [id number]      VS & Pain: VSS on room air; prn Oxycodone x1 and IV Dilaudid x1 were given for back pain- ineffective, not long lasting enough per pt, MD added scheduled OxyContin administered x1- partially effective    Neuro: A&O x4    Respiratory: WDL    Cardiac: WDL    Peripheral neurovascular: 1+ edema noted in BLE    GI/: adequate urine output, reported constipation- administered scheduled Miralax and Senna, prn suppository x1, then BM x1 but pt still feels constipated and distended- prn Miralax x1 given    Nutrition: good appetite and oral intake, denied nausea    Skin: WDL ex bruised and PIV    Musculoskeletal: generalized weakness    Lines: L PIV is saline locked    Activity: Up independently    Events this shift: Hep gtt was discontinued, started po Xarelto. WBC was elevated and pt said she feels funny in her lungs, MD ordered a chest X-ray to check for pneumonia. Call light within reach.    Plan: Continue to monitor and follow poc. Plan to have a breast mass biopsy outpatient on 12/17. Pain meds were switched to all po, monitor effectiveness. Possible discharge tomorrow.

## 2020-12-10 ENCOUNTER — PATIENT OUTREACH (OUTPATIENT)
Dept: CARE COORDINATION | Facility: CLINIC | Age: 45
End: 2020-12-10

## 2020-12-10 VITALS
HEIGHT: 72 IN | DIASTOLIC BLOOD PRESSURE: 78 MMHG | RESPIRATION RATE: 18 BRPM | SYSTOLIC BLOOD PRESSURE: 144 MMHG | HEART RATE: 74 BPM | TEMPERATURE: 98.6 F | WEIGHT: 235.89 LBS | OXYGEN SATURATION: 99 % | BODY MASS INDEX: 31.95 KG/M2

## 2020-12-10 LAB
ALBUMIN SERPL-MCNC: 3.3 G/DL (ref 3.4–5)
ALP SERPL-CCNC: 108 U/L (ref 40–150)
ALT SERPL W P-5'-P-CCNC: 30 U/L (ref 0–50)
ANION GAP SERPL CALCULATED.3IONS-SCNC: 7 MMOL/L (ref 3–14)
AST SERPL W P-5'-P-CCNC: 14 U/L (ref 0–45)
BASOPHILS # BLD AUTO: 0 10E9/L (ref 0–0.2)
BASOPHILS NFR BLD AUTO: 0.1 %
BILIRUB SERPL-MCNC: 0.3 MG/DL (ref 0.2–1.3)
BUN SERPL-MCNC: 11 MG/DL (ref 7–30)
CALCIUM SERPL-MCNC: 9.7 MG/DL (ref 8.5–10.1)
CHLORIDE SERPL-SCNC: 105 MMOL/L (ref 94–109)
CO2 SERPL-SCNC: 27 MMOL/L (ref 20–32)
CREAT SERPL-MCNC: 0.82 MG/DL (ref 0.52–1.04)
DIFFERENTIAL METHOD BLD: ABNORMAL
EOSINOPHIL # BLD AUTO: 0 10E9/L (ref 0–0.7)
EOSINOPHIL NFR BLD AUTO: 0.2 %
ERYTHROCYTE [DISTWIDTH] IN BLOOD BY AUTOMATED COUNT: 13.7 % (ref 10–15)
GFR SERPL CREATININE-BSD FRML MDRD: 87 ML/MIN/{1.73_M2}
GLUCOSE SERPL-MCNC: 107 MG/DL (ref 70–99)
HCT VFR BLD AUTO: 38.2 % (ref 35–47)
HGB BLD-MCNC: 11.8 G/DL (ref 11.7–15.7)
IMM GRANULOCYTES # BLD: 0.1 10E9/L (ref 0–0.4)
IMM GRANULOCYTES NFR BLD: 0.6 %
INR PPP: 1.63 (ref 0.86–1.14)
LYMPHOCYTES # BLD AUTO: 3.1 10E9/L (ref 0.8–5.3)
LYMPHOCYTES NFR BLD AUTO: 27.8 %
MCH RBC QN AUTO: 27.6 PG (ref 26.5–33)
MCHC RBC AUTO-ENTMCNC: 30.9 G/DL (ref 31.5–36.5)
MCV RBC AUTO: 89 FL (ref 78–100)
MONOCYTES # BLD AUTO: 0.9 10E9/L (ref 0–1.3)
MONOCYTES NFR BLD AUTO: 8.5 %
NEUTROPHILS # BLD AUTO: 6.9 10E9/L (ref 1.6–8.3)
NEUTROPHILS NFR BLD AUTO: 62.8 %
NRBC # BLD AUTO: 0 10*3/UL
NRBC BLD AUTO-RTO: 0 /100
PLATELET # BLD AUTO: 253 10E9/L (ref 150–450)
POTASSIUM SERPL-SCNC: 4 MMOL/L (ref 3.4–5.3)
PROT SERPL-MCNC: 7.8 G/DL (ref 6.8–8.8)
RBC # BLD AUTO: 4.28 10E12/L (ref 3.8–5.2)
SODIUM SERPL-SCNC: 138 MMOL/L (ref 133–144)
UFH PPP CHRO-ACNC: 0.69 IU/ML
WBC # BLD AUTO: 11.1 10E9/L (ref 4–11)

## 2020-12-10 PROCEDURE — 85520 HEPARIN ASSAY: CPT | Performed by: INTERNAL MEDICINE

## 2020-12-10 PROCEDURE — 80053 COMPREHEN METABOLIC PANEL: CPT | Performed by: INTERNAL MEDICINE

## 2020-12-10 PROCEDURE — 250N000013 HC RX MED GY IP 250 OP 250 PS 637: Performed by: INTERNAL MEDICINE

## 2020-12-10 PROCEDURE — 99239 HOSP IP/OBS DSCHRG MGMT >30: CPT | Performed by: STUDENT IN AN ORGANIZED HEALTH CARE EDUCATION/TRAINING PROGRAM

## 2020-12-10 PROCEDURE — 36415 COLL VENOUS BLD VENIPUNCTURE: CPT | Performed by: INTERNAL MEDICINE

## 2020-12-10 PROCEDURE — 250N000013 HC RX MED GY IP 250 OP 250 PS 637: Performed by: PHYSICIAN ASSISTANT

## 2020-12-10 PROCEDURE — 250N000012 HC RX MED GY IP 250 OP 636 PS 637: Performed by: INTERNAL MEDICINE

## 2020-12-10 PROCEDURE — 250N000013 HC RX MED GY IP 250 OP 250 PS 637: Performed by: STUDENT IN AN ORGANIZED HEALTH CARE EDUCATION/TRAINING PROGRAM

## 2020-12-10 PROCEDURE — 85025 COMPLETE CBC W/AUTO DIFF WBC: CPT | Performed by: INTERNAL MEDICINE

## 2020-12-10 PROCEDURE — 85610 PROTHROMBIN TIME: CPT | Performed by: INTERNAL MEDICINE

## 2020-12-10 PROCEDURE — 250N000013 HC RX MED GY IP 250 OP 250 PS 637: Performed by: NURSE PRACTITIONER

## 2020-12-10 RX ORDER — QUETIAPINE FUMARATE 100 MG/1
100 TABLET, FILM COATED ORAL AT BEDTIME
Qty: 30 TABLET | Refills: 1 | Status: SHIPPED | OUTPATIENT
Start: 2020-12-10 | End: 2021-01-12

## 2020-12-10 RX ORDER — NICOTINE 21 MG/24HR
1 PATCH, TRANSDERMAL 24 HOURS TRANSDERMAL DAILY
Qty: 30 PATCH | Refills: 1 | Status: SHIPPED | OUTPATIENT
Start: 2020-12-11 | End: 2021-05-12

## 2020-12-10 RX ORDER — MORPHINE SULFATE 15 MG/1
15 TABLET, FILM COATED, EXTENDED RELEASE ORAL EVERY 12 HOURS
Qty: 10 TABLET | Refills: 0 | Status: SHIPPED | OUTPATIENT
Start: 2020-12-10 | End: 2020-12-22

## 2020-12-10 RX ORDER — OXYCODONE HCL 10 MG/1
10 TABLET, FILM COATED, EXTENDED RELEASE ORAL EVERY 12 HOURS
Qty: 10 TABLET | Refills: 0 | Status: SHIPPED | OUTPATIENT
Start: 2020-12-10 | End: 2020-12-10

## 2020-12-10 RX ORDER — PANTOPRAZOLE SODIUM 40 MG/1
40 TABLET, DELAYED RELEASE ORAL
Qty: 30 TABLET | Refills: 1 | Status: SHIPPED | OUTPATIENT
Start: 2020-12-11 | End: 2021-01-12

## 2020-12-10 RX ORDER — POLYETHYLENE GLYCOL 3350 17 G/17G
17 POWDER, FOR SOLUTION ORAL DAILY
Qty: 510 G | Refills: 1 | Status: SHIPPED | OUTPATIENT
Start: 2020-12-10 | End: 2021-03-03

## 2020-12-10 RX ORDER — LIDOCAINE 4 G/G
1-2 PATCH TOPICAL EVERY 24 HOURS
Qty: 30 PATCH | Refills: 1 | Status: SHIPPED | OUTPATIENT
Start: 2020-12-10 | End: 2021-05-10

## 2020-12-10 RX ORDER — OXYCODONE HYDROCHLORIDE 5 MG/1
5 TABLET ORAL EVERY 8 HOURS PRN
Qty: 10 TABLET | Refills: 0 | Status: SHIPPED | OUTPATIENT
Start: 2020-12-10 | End: 2020-12-22

## 2020-12-10 RX ORDER — ONDANSETRON 4 MG/1
4 TABLET, ORALLY DISINTEGRATING ORAL EVERY 6 HOURS PRN
Qty: 120 TABLET | Refills: 0 | Status: SHIPPED | OUTPATIENT
Start: 2020-12-10 | End: 2022-05-13

## 2020-12-10 RX ORDER — METHOCARBAMOL 500 MG/1
500 TABLET, FILM COATED ORAL 4 TIMES DAILY PRN
Qty: 60 TABLET | Refills: 0 | Status: SHIPPED | OUTPATIENT
Start: 2020-12-10 | End: 2021-01-12

## 2020-12-10 RX ORDER — DEXAMETHASONE 4 MG/1
4 TABLET ORAL EVERY 6 HOURS
Qty: 120 TABLET | Refills: 1 | Status: SHIPPED | OUTPATIENT
Start: 2020-12-10 | End: 2021-01-12

## 2020-12-10 RX ADMIN — OXYCODONE HYDROCHLORIDE 10 MG: 10 TABLET, FILM COATED, EXTENDED RELEASE ORAL at 16:43

## 2020-12-10 RX ADMIN — POLYETHYLENE GLYCOL 3350 17 G: 17 POWDER, FOR SOLUTION ORAL at 08:44

## 2020-12-10 RX ADMIN — DEXAMETHASONE 4 MG: 4 TABLET ORAL at 11:31

## 2020-12-10 RX ADMIN — PANTOPRAZOLE SODIUM 40 MG: 40 TABLET, DELAYED RELEASE ORAL at 08:44

## 2020-12-10 RX ADMIN — LIDOCAINE 2 PATCH: 560 PATCH PERCUTANEOUS; TOPICAL; TRANSDERMAL at 04:53

## 2020-12-10 RX ADMIN — NICOTINE 1 PATCH: 14 PATCH, EXTENDED RELEASE TRANSDERMAL at 08:45

## 2020-12-10 RX ADMIN — DOCUSATE SODIUM AND SENNOSIDES 2 TABLET: 8.6; 5 TABLET ORAL at 08:44

## 2020-12-10 RX ADMIN — SERTRALINE HYDROCHLORIDE 50 MG: 50 TABLET ORAL at 08:44

## 2020-12-10 RX ADMIN — OXYCODONE HYDROCHLORIDE 10 MG: 10 TABLET, FILM COATED, EXTENDED RELEASE ORAL at 04:44

## 2020-12-10 RX ADMIN — RIVAROXABAN 15 MG: 15 TABLET, FILM COATED ORAL at 08:44

## 2020-12-10 RX ADMIN — METHOCARBAMOL 500 MG: 500 TABLET, FILM COATED ORAL at 08:43

## 2020-12-10 RX ADMIN — DEXAMETHASONE 4 MG: 4 TABLET ORAL at 04:45

## 2020-12-10 RX ADMIN — METHOCARBAMOL 500 MG: 500 TABLET, FILM COATED ORAL at 16:43

## 2020-12-10 RX ADMIN — METHOCARBAMOL 500 MG: 500 TABLET, FILM COATED ORAL at 11:31

## 2020-12-10 ASSESSMENT — ACTIVITIES OF DAILY LIVING (ADL)
ADLS_ACUITY_SCORE: 16

## 2020-12-10 NOTE — PROGRESS NOTES
BP (!) 144/78 (BP Location: Left arm)   Pulse 74   Temp 98.6  F (37  C) (Axillary)   Resp 18   Ht 1.829 m (6')   Wt 107 kg (235 lb 14.3 oz)   LMP 11/21/2020   SpO2 99%   BMI 31.99 kg/m      Patient status deemed adequate for discharge by primary team. Discharge details:     Discharge Instructions: Reviewed instructions and AVS reviewed with patient and family/caregivers. No further questions at this time    Patient Belongings: Patient acknowledges receipt of all personal belongings at time of discharge.     Prescriptions: To be picked up at discharge pharmacy on the way out - RN will      Disposition: Pt will return to prior living situation     Transportation: Family to provide ride     Patient left 5B at ____

## 2020-12-10 NOTE — PLAN OF CARE
Cares 1900 to 0700    /79 (BP Location: Left arm)   Pulse 57   Temp 98.2  F (36.8  C) (Oral)   Resp 18   Ht 1.829 m (6')   Wt 107 kg (235 lb 14.3 oz)   LMP 11/21/2020   SpO2 99%   BMI 31.99 kg/m      Pain: Endorses back pain. Pain meds given per MAR with some relief.    Neuros: A&o x4, cooperative.   Cardiac: HR in mid 50s at rest intermittently. Denies chest pain.   Resp: LS clear. RRR on room air.   GI/: Endorses constipation, senna given. Had 1 small BM. Voids spontaneously.   Nutrition: Regular diet. Denies nausea.  IV/Drains: L PIV SL.   Skin: Intact. No new deficits observed.   Activity: Up independent.     Events this shift: Continues to have back pain. Pt states pain is manageable w/lidocaine patches, repositioning, & meds. LLE has no pain unlike pt's first admission date. Pt was able to sleep for ~4 hrs tonight. Pt inquiring about how to manage COVID dx & back pain after discharge. Provider notified.     Plan of care: Possible discharge today to cousin's home per SW's notes. It appears her family will provide transportation.     Outpatient biopsy scheduled for 12/17/2020. Awaiting further planning for radiation therapy pending biopsy & MRI of lumbar spine.

## 2020-12-10 NOTE — PROGRESS NOTES
[id number] progress note. Care from: 07:00 - 15:00     BP (!) 144/78 (BP Location: Left arm)   Pulse 74   Temp 98.6  F (37  C) (Axillary)   Resp 18   Ht 1.829 m (6')   Wt 107 kg (235 lb 14.3 oz)   LMP 11/21/2020   SpO2 99%   BMI 31.99 kg/m         Vitals: VSS ex mild htn     Neuro: WDL   o Pain: Denies pain   o Mood/Behavior: Calm and cooperative     Activity: Pt up ad cheryl in room, completely independent     Cardiovascular: WDL     Respiratory: WDL ex mild dyspnea on exertion. SpO2 stable on RA     GI & Nutrition: Excellent appetite, constipation   o Nausea: Denies   o Bowel Movement: No good BM for a few days. Senna and miralax given     : WDL     Skin, Wounds & LDAs: WDL    Events & Plan Updates: Plan to discharge this afternoon to prior living situation. Patient okay with this plan, will need some counseling concerning proper isolation guidelines for COVID+ patients in the community living with others

## 2020-12-10 NOTE — PLAN OF CARE
Discharged to: Home  Transportation: wheelchair ride by staff to meet ride  Time: 1700  Prescriptions: Sent w/ patient  Belongings: Packed by patient   PIV/Access: removed by prior RN on day shift  Paperwork: reviewed w/ patient by previous RN on day shift

## 2020-12-11 ENCOUNTER — PATIENT OUTREACH (OUTPATIENT)
Dept: CARE COORDINATION | Facility: CLINIC | Age: 45
End: 2020-12-11

## 2020-12-11 DIAGNOSIS — U07.1 2019 NOVEL CORONAVIRUS DISEASE (COVID-19): Primary | ICD-10-CM

## 2020-12-11 LAB — COPATH REPORT: NORMAL

## 2020-12-11 NOTE — PROGRESS NOTES
Clinic Care Coordination Contact    Clinic Care Coordination Contact     Follow Up Progress Note      Reason for Referral:      Intervention/Education provided during outreach: CHW spoke with patient. Reviewed Hospital COVID discharge instructions. Patient DID NOT accept assistance from CHW to schedule PCP follow up appointment with PCP at [facility].      Plan: CHW gave patient phone number to the [facility].    No further Outreach will be done at this time.

## 2020-12-11 NOTE — PROGRESS NOTES
[facility]: Post-Discharge Note  SITUATION                                                      Admission:    Admission Date: 12/05/20   Reason for Admission: Back pain  Discharge:   Discharge Date: 12/10/20  Discharge Diagnosis: Back pain    BACKGROUND                                                      45-year-old female with past medical history of nicotine addiction, DVTs, left breast mass presented to [place] ED with concerns of back pain.  Imaging done at outside facility showed MRI lumbar spine with L4 lesion and no radiological evidence of cord compression.  Her presentation was nonconcerning for pancreatitis or urine infection which she was tested for on 12/5/2020 and 12/9/2020.    ASSESSMENT      Discharge Assessment  Patient reports symptoms are: Improved  Does the patient have all of their medications?: Yes  Does patient know what their new medications are for?: Yes  Does patient have a follow-up appointment scheduled?: Yes  Does patient have any other questions or concerns?: No    Post-op  Did the patient have surgery or a procedure: No  Fever: No  Chills: No  Eating & Drinking: eating and drinking without complaints/concerns  PO Intake: regular diet  Bowel Function: normal  Urinary Status: voiding without complaint/concerns        PLAN                                                      Outpatient Plan:  Follow-up Appointments     Adult [facility] Follow-up and recommended labs and tests      Follow up with primary care provider, Physician No Ref-Primary, within 30   days, to evaluate medication change, to evaluate treatment change and for   hospital follow- up. No follow up labs or test are needed.     Breast biopsy on December 17th , 2020   Primary care doctor within a month   Hematology/oncology physician within a month   Radiation oncology within a month         Appointments on [place] and/or [facility] (with [facility] or [facility]   provider or service). Call [phone] if  you haven't heard regarding   these appointments within 7 days of discharge.         {Additional follow-up instructions/to-do's for PCP    :Pain control, wbc counts     Future Appointments   Date Time Provider Department Center   12/17/2020  1:30 PM [email or web address]   12/17/2020  2:00 PM [facility]   12/17/2020  2:30 PM [facility]   12/17/2020  3:30 PM [facility]   12/22/2020 11:15 AM Jahaira Plunkett MD [facility]           Karma Martin [facility]

## 2020-12-15 DIAGNOSIS — N63.0 BREAST MASS: ICD-10-CM

## 2020-12-15 LAB
SARS-COV-2 RNA SPEC QL NAA+PROBE: NORMAL
SPECIMEN SOURCE: NORMAL

## 2020-12-15 PROCEDURE — U0003 INFECTIOUS AGENT DETECTION BY NUCLEIC ACID (DNA OR RNA); SEVERE ACUTE RESPIRATORY SYNDROME CORONAVIRUS 2 (SARS-COV-2) (CORONAVIRUS DISEASE [COVID-19]), AMPLIFIED PROBE TECHNIQUE, MAKING USE OF HIGH THROUGHPUT TECHNOLOGIES AS DESCRIBED BY CMS-2020-01-R: HCPCS | Performed by: PATHOLOGY

## 2020-12-16 ENCOUNTER — TELEPHONE (OUTPATIENT)
Dept: NURSING | Facility: CLINIC | Age: 45
End: 2020-12-16

## 2020-12-16 LAB
LABORATORY COMMENT REPORT: ABNORMAL
SARS-COV-2 RNA SPEC QL NAA+PROBE: POSITIVE
SPECIMEN SOURCE: ABNORMAL

## 2020-12-17 ENCOUNTER — ANCILLARY PROCEDURE (OUTPATIENT)
Dept: MAMMOGRAPHY | Facility: CLINIC | Age: 45
End: 2020-12-17
Attending: INTERNAL MEDICINE
Payer: MEDICAID

## 2020-12-17 DIAGNOSIS — N63.0 BREAST MASS: Primary | ICD-10-CM

## 2020-12-17 PROCEDURE — 999N001019 HC STATISTIC H-FISH PROCESS B/S: Performed by: INTERNAL MEDICINE

## 2020-12-17 PROCEDURE — 88305 TISSUE EXAM BY PATHOLOGIST: CPT | Performed by: PATHOLOGY

## 2020-12-17 PROCEDURE — 19083 BX BREAST 1ST LESION US IMAG: CPT | Mod: LT | Performed by: RADIOLOGY

## 2020-12-17 PROCEDURE — 77066 DX MAMMO INCL CAD BI: CPT | Performed by: RADIOLOGY

## 2020-12-17 PROCEDURE — 77605 HYPERTHERMIA EXT GEN DEEP: CPT | Mod: LT | Performed by: RADIOLOGY

## 2020-12-17 PROCEDURE — 88377 M/PHMTRC ALYS ISHQUANT/SEMIQ: CPT | Performed by: MEDICAL GENETICS

## 2020-12-17 PROCEDURE — G0279 TOMOSYNTHESIS, MAMMO: HCPCS | Performed by: RADIOLOGY

## 2020-12-17 PROCEDURE — 999N001020 HC STATISTIC H-SEND OUTS PREP: Performed by: INTERNAL MEDICINE

## 2020-12-17 PROCEDURE — 88360 TUMOR IMMUNOHISTOCHEM/MANUAL: CPT | Performed by: PATHOLOGY

## 2020-12-17 PROCEDURE — 76642 ULTRASOUND BREAST LIMITED: CPT | Mod: LT | Performed by: RADIOLOGY

## 2020-12-17 NOTE — TELEPHONE ENCOUNTER
FNA informed patient her second COVID-19 result is positive and should follow-up with her PCP.  Patient says she has no PCP yet; says will call tomorrow to get one.    Fred Ugalde RN/[facility], 12/16/20, 6:09 PM.

## 2020-12-21 ENCOUNTER — PATIENT OUTREACH (OUTPATIENT)
Dept: ONCOLOGY | Facility: CLINIC | Age: 45
End: 2020-12-21

## 2020-12-21 ENCOUNTER — TELEPHONE (OUTPATIENT)
Dept: GENERAL RADIOLOGY | Facility: CLINIC | Age: 45
End: 2020-12-21

## 2020-12-21 NOTE — PROGRESS NOTES
RN CARE COORDINATION NOTE        Outgoing: Called patient to determine her ability to do a video visit with Dr Plunkett for her breast cancer consultation.  She is able to do the video on her cell phone. Appointment updated and patient is in agreement with plan.     Miranda Cunha MSN, RN, OCN  RN Care Coordinator  [facility]  [phone]

## 2020-12-22 ENCOUNTER — VIRTUAL VISIT (OUTPATIENT)
Dept: ONCOLOGY | Facility: CLINIC | Age: 45
End: 2020-12-22
Attending: PHYSICIAN ASSISTANT
Payer: MEDICAID

## 2020-12-22 ENCOUNTER — PRE VISIT (OUTPATIENT)
Dept: ONCOLOGY | Facility: CLINIC | Age: 45
End: 2020-12-22

## 2020-12-22 DIAGNOSIS — N63.0 BREAST MASS: ICD-10-CM

## 2020-12-22 DIAGNOSIS — C79.51 SPINE METASTASIS: ICD-10-CM

## 2020-12-22 PROCEDURE — 99215 OFFICE O/P EST HI 40 MIN: CPT | Mod: GC | Performed by: INTERNAL MEDICINE

## 2020-12-22 PROCEDURE — 99354 PR PROLONGED SERV,OFFICE,1ST HR: CPT | Mod: GC | Performed by: INTERNAL MEDICINE

## 2020-12-22 RX ORDER — OXYCODONE HYDROCHLORIDE 5 MG/1
10 CAPSULE ORAL EVERY 6 HOURS PRN
Qty: 120 CAPSULE | Refills: 0 | Status: SHIPPED | OUTPATIENT
Start: 2020-12-22 | End: 2020-12-23

## 2020-12-22 RX ORDER — MORPHINE SULFATE 15 MG/1
15 TABLET, FILM COATED, EXTENDED RELEASE ORAL EVERY 12 HOURS
Qty: 60 TABLET | Refills: 0 | Status: SHIPPED | OUTPATIENT
Start: 2020-12-22 | End: 2021-01-12

## 2020-12-22 NOTE — LETTER
"    12/22/2020         RE: Teresa Sanderson  [address] [place] [facility] [address]        Dear Colleague,    Thank you for referring your patient, Teresa Sanderson, to the [facility]. Please see a copy of my visit note below.    Teresa Sanderson is a 45 year old female who is being evaluated via a billable video visit.      The patient has been notified of following:     \"This video visit will be conducted via a call between you and your physician/provider. We have found that certain health care needs can be provided without the need for an in-person physical exam.  This service lets us provide the care you need with a video conversation.  If a prescription is necessary we can send it directly to your pharmacy.  If lab work is needed we can place an order for that and you can then stop by our lab to have the test done at a later time.    Video visits are billed at different rates depending on your insurance coverage.  Please reach out to your insurance provider with any questions.    If during the course of the call the physician/provider feels a video visit is not appropriate, you will not be charged for this service.\"    Patient has given verbal consent for Video visit? Yes  How would you like to obtain your AVS? Mail a copy  If you are dropped from the video visit, the video invite should be resent to: Text to cell phone: [phone]  Will anyone else be joining your video visit? No    Vitals - Patient Reported  Weight (Patient Reported): 105.2 kg (232 lb)  Height (Patient Reported): 182.9 cm (6')  BMI (Based on Pt Reported Ht/Wt): 31.46  Pain Score: Worst Pain (10)  Pain Loc: Low Back    Mami Reyes CMA December 22, 2020  11:03 AM       Video-Visit Details    Type of service:  Video Visit    Total time of video visit:  75 minutes    Originating Location (pt. Location): Home    Distant Location (provider location):  [facility] "     Platform used for Video Visit: Chad Plunkett MD    Oncology New Patient Consultation:   Date on this visit: 12/22/2020     Teresa Sanderson is referred by Dr.Jenna SUMAN Reyes for an oncology consultation. She requires evaluation for new diagnosis of left breast, ER positive cancer metastasized to bones.     Primary Physician: No Ref-Primary, Physician     History Of Present Illness:    Ms. Sanderson is a 45 year old female with a h/o tobacco abuse and DVTs with left breast cancer metastasized to bone. She presented to [facility] ED with back pain on 12/5/2020. MRI of the L-spine showed an abnormal L4 lesion with associated right paraspinal mass, abnormalities in L5 and the left iliac bone were also seen. CT C/A/P showed a left breast mass, lytic lesions of T7, L4, and the pelvis, and a 3 cm lesion in the kidney (thought to be a cyst). Ultrasound of the bilateral lower extremities showed a non-occlusive thrombus in the left popliteal vein. Mammogram and ultrasound of the bilateral breasts on 12/17/2020 showed a spiculated mass measuring at least 7.8 cm at 12-1:00 left breast extending from the nipple to 9 cm from the nipple with associated nipple retraction. This mass was biopsied, and showed IDC with surrounding DCIS, grade 3, ER+ 90%, and IA+ 75%.  HER2 is still pending.     Patient was hospitalized 12/5 - 12/10/2020.  She was evaluated by radiation oncology for radiation to the L-spine, but not yet started on treatment as diagnosis was pending at that time.  She was discharged from the hospital with MS contin 15 mg PO BID and oxycodone prn breakthrough pain.  She was also to take dexamethasone 4 mg PO every 6 hours.  She endorses ongoing low energy and fatigue since discharge from the hospital. Reports sharp low back pain associated with sensory loss in the bilateral lower extremities, left greater than right. This sensory loss has been going on for two days. No fecal incontinence,  "though does state bladder is \"leaky.\"  She states she ran out of the MS Contin and oxycodone a couple of days ago.  She also states she has only been taking her dexamethasone twice daily, as she did not realize it was prescribed four times daily.  She takes ibuprofen to help with pain, up to 6-7/day. Appetite is is varied, states certain times of day feels appetite is greater though will go away at other times of day. Mild nausea, no vomiting. Feels abdominal fullness. Mild constipation, though taking laxative daily in the morning. No new headaches or vision changes.     Social: She reports an unstable home situation. Trying to get into a shelter. She is currently staying with her cousin, where she is able to to rest and bathe. Has two grandkids, 5 kids of her own. One of her daughters stays with [employer], who is working and helping to provide for her.      She smokes cigarettes, 5-10 cigarettes/day x 10 years. Goes to this during times of stress. Working on trying to connect with primary care doctor. Reports approximately 10 drinks/month. Drinks a \"shot here and there.\" Smokes marijuana to help with pain.     Gynecological history: Menarche: 14, First child: close to 18 years old.  Still having menstrual cycles, currently endorsing heavier cycle. Normally regular periods, lasting 3 days.     The remainder of a complete 12 point review of systems was reviewed with the patient and was negative with the exception of that mentioned above.      Past Medical/Surgical History:   Past Medical History:   Diagnosis Date     Anxiety      Depression      DVT (deep venous thrombosis) (H) 2014     Left breast mass     x approximately 4-5 months     Pulmonary emboli (H)      Pyelonephritis      Schizoaffective disorder (H)      Tobacco use      Past Surgical History:   Procedure Laterality Date     TUBAL LIGATION  1998      No Known Allergies    Current Outpatient Medications   Medication     dexamethasone (DECADRON) 4 MG tablet " "    Lidocaine (LIDOCARE) 4 % Patch     methocarbamol (ROBAXIN) 500 MG tablet     morphine (MS CONTIN) 15 MG CR tablet     nicotine (NICODERM CQ) 14 MG/24HR 24 hr patch     ondansetron (ZOFRAN-ODT) 4 MG ODT tab     oxyCODONE (OXY-IR) 5 MG capsule     pantoprazole (PROTONIX) 40 MG EC tablet     polyethylene glycol (MIRALAX) 17 GM/Dose powder     QUEtiapine (SEROQUEL) 100 MG tablet     rivaroxaban ANTICOAGULANT (XARELTO) 15 MG TABS tablet     sertraline (ZOLOFT) 50 MG tablet     No current facility-administered medications for this visit.    '    Family History:   Aunt on mother's side with lung cancer.  Dad with lung cancer, diagnosed in his 80s.   Paternal grandmother with breast cancer, diagnosed in her 70s.  No family history of uterine or ovarian cancer.    Social History:   Single.  Has 5 children.  She is currently unemployed and is living with her cousin.  She has been homeless at times.  She smokes cigarettes, 5-10/day x 10 years. Working on trying to connect with primary care doctor. Approximately 10 drinks/month. Drinks a \"shot here and there.\" Smokes marijuana to help with pain.    Physical Exam:   General: Adult female in NAD, however, moving frequently throughout the visit as if sitting still makes her uncomfortable.  Eyes: No erythema or discharge   Respiratory: Breathing comfortably on room air. No wheezing or distress.   Musculoskeletal: Full ROM of the bilateral upper extremities.   Skin: No visible concerning skin rashes or lesions   Neuro: No notable tremor and dyskinetic movements.   Psych: Mood and affect appear normal.     The rest of a comprehensive physical examination is deferred due to PHE (public health emergency) video visit restrictions.     Laboratory/Imaging Studies   12/17/2020 breast biopsy:   - Breast, left, 12:00, ultrasound guided core biopsy:   - Invasive ductal carcinoma, MEGA GRADE 3   - Ductal carcinoma in situ (DCIS), nuclear grade 2, cribriform type   - IDC is  ER " positive 90% and MA positive 75% by IHC  - HER2 FISH result is still pending.     12/17/2020 Ultrasound/mammogram bilateral breasts:   - Left breast, 12:00 - 1:00, is a spiculated mass extending from the nipple to 9 cm from the nipple, measuring at least 7.8 cm with associated nipple retraction.   - Right breast 6:00, 4 cm from the nipple is a cyst with septations measuring 1.6 cm.     12/7/2020 MRI brain:   Normal brain MRI     12/6/2020 Ultrasound bilateral lower extremities:   - Non-occlusive thrombus left popliteal vein     12/5/2020 MRI L-spine:   - Abnormal signal enhancement entirety of L4 vertebral body with associated right sided paraspinal mass measuring 3.7 cm.   - Focus of abnormal signal intensity in the superior L5 vertebral body.   - Focal marrow abnormalities in the left iliac bone.   - Multilevel degenerative changes, most advanced at L4-5     12/5/2020 CT abdomen/pelvis:   - ill-defined lytic lesion with a rind of associated soft tissue about the L4 vertebral body suspicious for malignancy   - several smaller ill-defined areas of lytic change within the pelvic bones   - 3 cm low density lesion in the lower right kidney, most likely represents a cyst     12/5/2020 CT chest:   - Left breast mass measuring 2.6 cm   - Lytic lesions of the T7 and L4 vertebral bodies.   - thickened endometrial stripe at 1.5 cm     12/5/2020 and 12/15/2020 Asymptomatic COVID screen: Positive.     ASSESSMENT/PLAN:   45 year old female with history of DVT with de víctor metastatic left breast invasive ductal carcinoma, ER+ 90%, MA+ 75%, HER2 pending metastasized to bones.    We explained to patient that she has stage IV breast cancer which is treatable, but not curable.  The goal of treatment is to prolong overall survival while maintaining quality of life.  We reviewed the HER2 status of her cancer is still pending. We counseled her today as if the HER2 was negative, however, if the tumor is HER2 positive (triple positive  disease), then the treatment strategy will be very different.  In the scenario of HER2 positive disease up front treatment with chemotherapy + HER targeted therapy would be recommended. We did not discuss this in detail during this visit and will make another visit to go over details of this treatment strategy if her tumor is  HER2+.     We discussed the management of HR+, HER2- metastatic breast cancer. We explained to her since the tumor already spread outside of the breast and the lymph nodes in the area of the breast, she is not a candidate for surgical resection of her breast tumor.  Removal of the primary breast tumor will not improve overall survival in stage IV breast cancer.  Our recommendation for treatment of HR+, HER2- metastatic breast cancer in a premenopausal woman is a combination of CDK 4/6 inhibitor, aromatase inhibitor, and ovarian suppression.  We would specifically recommend a regimen of Ibrance, letrozole, and Zoladex.  We discussed each medication in detail including dosing and side effects. We specifically reviewed the side effects of ovarian suppression and letrozole including but not limited to fatigue, hot flashes, muscle aches/joint pains, joint stiffness, worsening osteoporosis, mood instability/changes, and thinning of hair. We also reviewed the side effects of CDK4/6 inhibitor include but not limited to bone marrow suppression and neutropenia, febrile neutropenia, increased risk of infection, anemia, thrombocytopenia, fatigue, GI side effects such as diarrhea, nausea, and vomiting.     We are concerned given her social situation that coming in for frequent labs for blood count monitoring on Ibrance and for monthly zoladex injections may be difficult.  An alternative option would be treatment with Tamoxifen +/- Ibrance, however, we are not comfortable with this regimen in a patient with a h/o multiple DVTs. She states she believes she will be able to come in to clinic as much as she  needs to.  Therefore, as long as HER2 returns negative, we will plan for initial treatment with Ibrance. Letrozole, and Zoladex.      Given she has extensive bone metastatic disease, we also recommend starting a bisphosphate to treat her bone disease, which can help to control her pain and stabilize her bone lesions. We explained we will use Zometa, which will be given intravenously once every 3 months. The side effects of this medication include but are not limited to: flu like symptoms after infusion, fatigue, dizziness, headache, and osteonecrosis of the jaw. We recommend that she have a dental evaluation before initiation of this treatment, and she should keep us informed about any dental procedure while she is on the treatment.     Prior to starting any of her systemic treatments, we recommend a course of radiation to the L-spine.  We are very concerned regarding new numbness in the lower extremities.  We will contact radiation oncology today to expedite this treatment.  We also recommend that she take her dexamethasone QID as prescribed.  In addition, we refilled prescriptions for MS Contin and oxycodone.  We explained that neither of these medications would be refilled in the next month, therefore it is important that she take as prescribed and do what she can to ensure that no one else takes her medication.    She has a complex social situation for which she would benefit from  involvement.  We have placed a referral for this.    Patient was seen and discussed with Dr Plunkett.     Eveline Meraz  Hem/Onco Fellow     This video visit was conducted with the patient, Dr. Meraz, and myself.  I have edited the above note to reflect our joint assessment and plan. A total of 75 minutes was spent on video visit with this patient today.    Jahaira Plunkett MD

## 2020-12-22 NOTE — PROGRESS NOTES
"Teresa Sanderson is a 45 year old female who is being evaluated via a billable video visit.      The patient has been notified of following:     \"This video visit will be conducted via a call between you and your physician/provider. We have found that certain health care needs can be provided without the need for an in-person physical exam.  This service lets us provide the care you need with a video conversation.  If a prescription is necessary we can send it directly to your pharmacy.  If lab work is needed we can place an order for that and you can then stop by our lab to have the test done at a later time.    Video visits are billed at different rates depending on your insurance coverage.  Please reach out to your insurance provider with any questions.    If during the course of the call the physician/provider feels a video visit is not appropriate, you will not be charged for this service.\"    Patient has given verbal consent for Video visit? Yes  How would you like to obtain your AVS? Mail a copy  If you are dropped from the video visit, the video invite should be resent to: Text to cell phone: [phone]  Will anyone else be joining your video visit? No    Vitals - Patient Reported  Weight (Patient Reported): 105.2 kg (232 lb)  Height (Patient Reported): 182.9 cm (6')  BMI (Based on Pt Reported Ht/Wt): 31.46  Pain Score: Worst Pain (10)  Pain Loc: Low Back    Mami Reyes CMA December 22, 2020  11:03 AM       Video-Visit Details    Type of service:  Video Visit    Total time of video visit:  75 minutes    Originating Location (pt. Location): Home    Distant Location (provider location):  [facility]     Platform used for Video Visit: Chad Plunkett MD    Oncology New Patient Consultation:   Date on this visit: 12/22/2020     Teresa Sanderson is referred by Dr.Jenna SUMAN Reyes for an oncology consultation. She requires evaluation for new " "diagnosis of left breast, ER positive cancer metastasized to bones.     Primary Physician: No Ref-Primary, Physician     History Of Present Illness:    Ms. Sanderson is a 45 year old female with a h/o tobacco abuse and DVTs with left breast cancer metastasized to bone. She presented to [facility] ED with back pain on 12/5/2020. MRI of the L-spine showed an abnormal L4 lesion with associated right paraspinal mass, abnormalities in L5 and the left iliac bone were also seen. CT C/A/P showed a left breast mass, lytic lesions of T7, L4, and the pelvis, and a 3 cm lesion in the kidney (thought to be a cyst). Ultrasound of the bilateral lower extremities showed a non-occlusive thrombus in the left popliteal vein. Mammogram and ultrasound of the bilateral breasts on 12/17/2020 showed a spiculated mass measuring at least 7.8 cm at 12-1:00 left breast extending from the nipple to 9 cm from the nipple with associated nipple retraction. This mass was biopsied, and showed IDC with surrounding DCIS, grade 3, ER+ 90%, and NM+ 75%.  HER2 is still pending.     Patient was hospitalized 12/5 - 12/10/2020.  She was evaluated by radiation oncology for radiation to the L-spine, but not yet started on treatment as diagnosis was pending at that time.  She was discharged from the hospital with MS contin 15 mg PO BID and oxycodone prn breakthrough pain.  She was also to take dexamethasone 4 mg PO every 6 hours.  She endorses ongoing low energy and fatigue since discharge from the hospital. Reports sharp low back pain associated with sensory loss in the bilateral lower extremities, left greater than right. This sensory loss has been going on for two days. No fecal incontinence, though does state bladder is \"leaky.\"  She states she ran out of the MS Contin and oxycodone a couple of days ago.  She also states she has only been taking her dexamethasone twice daily, as she did not realize it was prescribed four times daily.  She takes ibuprofen to " "help with pain, up to 6-7/day. Appetite is is varied, states certain times of day feels appetite is greater though will go away at other times of day. Mild nausea, no vomiting. Feels abdominal fullness. Mild constipation, though taking laxative daily in the morning. No new headaches or vision changes.     Social: She reports an unstable home situation. Trying to get into a shelter. She is currently staying with her cousin, where she is able to to rest and bathe. Has two grandkids, 5 kids of her own. One of her daughters stays with [employer], who is working and helping to provide for her.      She smokes cigarettes, 5-10 cigarettes/day x 10 years. Goes to this during times of stress. Working on trying to connect with primary care doctor. Reports approximately 10 drinks/month. Drinks a \"shot here and there.\" Smokes marijuana to help with pain.     Gynecological history: Menarche: 14, First child: close to 18 years old.  Still having menstrual cycles, currently endorsing heavier cycle. Normally regular periods, lasting 3 days.     The remainder of a complete 12 point review of systems was reviewed with the patient and was negative with the exception of that mentioned above.      Past Medical/Surgical History:   Past Medical History:   Diagnosis Date     Anxiety      Depression      DVT (deep venous thrombosis) (H) 2014     Left breast mass     x approximately 4-5 months     Pulmonary emboli (H)      Pyelonephritis      Schizoaffective disorder (H)      Tobacco use      Past Surgical History:   Procedure Laterality Date     TUBAL LIGATION  1998      No Known Allergies    Current Outpatient Medications   Medication     dexamethasone (DECADRON) 4 MG tablet     Lidocaine (LIDOCARE) 4 % Patch     methocarbamol (ROBAXIN) 500 MG tablet     morphine (MS CONTIN) 15 MG CR tablet     nicotine (NICODERM CQ) 14 MG/24HR 24 hr patch     ondansetron (ZOFRAN-ODT) 4 MG ODT tab     oxyCODONE (OXY-IR) 5 MG capsule     pantoprazole " "(PROTONIX) 40 MG EC tablet     polyethylene glycol (MIRALAX) 17 GM/Dose powder     QUEtiapine (SEROQUEL) 100 MG tablet     rivaroxaban ANTICOAGULANT (XARELTO) 15 MG TABS tablet     sertraline (ZOLOFT) 50 MG tablet     No current facility-administered medications for this visit.    '    Family History:   Aunt on mother's side with lung cancer.  Dad with lung cancer, diagnosed in his 80s.   Paternal grandmother with breast cancer, diagnosed in her 70s.  No family history of uterine or ovarian cancer.    Social History:   Single.  Has 5 children.  She is currently unemployed and is living with her cousin.  She has been homeless at times.  She smokes cigarettes, 5-10/day x 10 years. Working on trying to connect with primary care doctor. Approximately 10 drinks/month. Drinks a \"shot here and there.\" Smokes marijuana to help with pain.    Physical Exam:   General: Adult female in NAD, however, moving frequently throughout the visit as if sitting still makes her uncomfortable.  Eyes: No erythema or discharge   Respiratory: Breathing comfortably on room air. No wheezing or distress.   Musculoskeletal: Full ROM of the bilateral upper extremities.   Skin: No visible concerning skin rashes or lesions   Neuro: No notable tremor and dyskinetic movements.   Psych: Mood and affect appear normal.     The rest of a comprehensive physical examination is deferred due to PHE (public health emergency) video visit restrictions.     Laboratory/Imaging Studies   12/17/2020 breast biopsy:   - Breast, left, 12:00, ultrasound guided core biopsy:   - Invasive ductal carcinoma, ME[place] GRADE 3   - Ductal carcinoma in situ (DCIS), nuclear grade 2, cribriform type   - IDC is  ER positive 90% and MD positive 75% by IHC  - HER2 FISH result is still pending.     12/17/2020 Ultrasound/mammogram bilateral breasts:   - Left breast, 12:00 - 1:00, is a spiculated mass extending from the nipple to 9 cm from the nipple, measuring at least 7.8 cm with " associated nipple retraction.   - Right breast 6:00, 4 cm from the nipple is a cyst with septations measuring 1.6 cm.     12/7/2020 MRI brain:   Normal brain MRI     12/6/2020 Ultrasound bilateral lower extremities:   - Non-occlusive thrombus left popliteal vein     12/5/2020 MRI L-spine:   - Abnormal signal enhancement entirety of L4 vertebral body with associated right sided paraspinal mass measuring 3.7 cm.   - Focus of abnormal signal intensity in the superior L5 vertebral body.   - Focal marrow abnormalities in the left iliac bone.   - Multilevel degenerative changes, most advanced at L4-5     12/5/2020 CT abdomen/pelvis:   - ill-defined lytic lesion with a rind of associated soft tissue about the L4 vertebral body suspicious for malignancy   - several smaller ill-defined areas of lytic change within the pelvic bones   - 3 cm low density lesion in the lower right kidney, most likely represents a cyst     12/5/2020 CT chest:   - Left breast mass measuring 2.6 cm   - Lytic lesions of the T7 and L4 vertebral bodies.   - thickened endometrial stripe at 1.5 cm     12/5/2020 and 12/15/2020 Asymptomatic COVID screen: Positive.     ASSESSMENT/PLAN:   45 year old female with history of DVT with de víctor metastatic left breast invasive ductal carcinoma, ER+ 90%, OR+ 75%, HER2 pending metastasized to bones.    We explained to patient that she has stage IV breast cancer which is treatable, but not curable.  The goal of treatment is to prolong overall survival while maintaining quality of life.  We reviewed the HER2 status of her cancer is still pending. We counseled her today as if the HER2 was negative, however, if the tumor is HER2 positive (triple positive disease), then the treatment strategy will be very different.  In the scenario of HER2 positive disease up front treatment with chemotherapy + HER targeted therapy would be recommended. We did not discuss this in detail during this visit and will make another visit to  go over details of this treatment strategy if her tumor is  HER2+.     We discussed the management of HR+, HER2- metastatic breast cancer. We explained to her since the tumor already spread outside of the breast and the lymph nodes in the area of the breast, she is not a candidate for surgical resection of her breast tumor.  Removal of the primary breast tumor will not improve overall survival in stage IV breast cancer.  Our recommendation for treatment of HR+, HER2- metastatic breast cancer in a premenopausal woman is a combination of CDK 4/6 inhibitor, aromatase inhibitor, and ovarian suppression.  We would specifically recommend a regimen of Ibrance, letrozole, and Zoladex.  We discussed each medication in detail including dosing and side effects. We specifically reviewed the side effects of ovarian suppression and letrozole including but not limited to fatigue, hot flashes, muscle aches/joint pains, joint stiffness, worsening osteoporosis, mood instability/changes, and thinning of hair. We also reviewed the side effects of CDK4/6 inhibitor include but not limited to bone marrow suppression and neutropenia, febrile neutropenia, increased risk of infection, anemia, thrombocytopenia, fatigue, GI side effects such as diarrhea, nausea, and vomiting.     We are concerned given her social situation that coming in for frequent labs for blood count monitoring on Ibrance and for monthly zoladex injections may be difficult.  An alternative option would be treatment with Tamoxifen +/- Ibrance, however, we are not comfortable with this regimen in a patient with a h/o multiple DVTs. She states she believes she will be able to come in to clinic as much as she needs to.  Therefore, as long as HER2 returns negative, we will plan for initial treatment with Ibrance. Letrozole, and Zoladex.      Given she has extensive bone metastatic disease, we also recommend starting a bisphosphate to treat her bone disease, which can help to  control her pain and stabilize her bone lesions. We explained we will use Zometa, which will be given intravenously once every 3 months. The side effects of this medication include but are not limited to: flu like symptoms after infusion, fatigue, dizziness, headache, and osteonecrosis of the jaw. We recommend that she have a dental evaluation before initiation of this treatment, and she should keep us informed about any dental procedure while she is on the treatment.     Prior to starting any of her systemic treatments, we recommend a course of radiation to the L-spine.  We are very concerned regarding new numbness in the lower extremities.  We will contact radiation oncology today to expedite this treatment.  We also recommend that she take her dexamethasone QID as prescribed.  In addition, we refilled prescriptions for MS Contin and oxycodone.  We explained that neither of these medications would be refilled in the next month, therefore it is important that she take as prescribed and do what she can to ensure that no one else takes her medication.    She has a complex social situation for which she would benefit from  involvement.  We have placed a referral for this.    Patient was seen and discussed with Dr Plunkett.     Eveline Meraz  Hem/Onco Fellow     This video visit was conducted with the patient, Dr. Meraz, and myself.  I have edited the above note to reflect our joint assessment and plan. A total of 75 minutes was spent on video visit with this patient today.    Jahaira Plunkett MD

## 2020-12-23 ENCOUNTER — ALLIED HEALTH/NURSE VISIT (OUTPATIENT)
Dept: RADIATION ONCOLOGY | Facility: CLINIC | Age: 45
End: 2020-12-23
Attending: RADIOLOGY
Payer: MEDICAID

## 2020-12-23 DIAGNOSIS — C79.51 SPINE METASTASIS: ICD-10-CM

## 2020-12-23 PROCEDURE — 77334 RADIATION TREATMENT AID(S): CPT | Performed by: RADIOLOGY

## 2020-12-23 PROCEDURE — 77290 THER RAD SIMULAJ FIELD CPLX: CPT | Performed by: RADIOLOGY

## 2020-12-23 PROCEDURE — 77263 THER RADIOLOGY TX PLNG CPLX: CPT | Performed by: RADIOLOGY

## 2020-12-23 PROCEDURE — 99213 OFFICE O/P EST LOW 20 MIN: CPT | Mod: 25 | Performed by: RADIOLOGY

## 2020-12-23 PROCEDURE — 77307 TELETHX ISODOSE PLAN CPLX: CPT | Performed by: RADIOLOGY

## 2020-12-23 PROCEDURE — 77387 GUIDANCE FOR RADJ TX DLVR: CPT | Performed by: RADIOLOGY

## 2020-12-23 PROCEDURE — 77290 THER RAD SIMULAJ FIELD CPLX: CPT | Mod: 26 | Performed by: RADIOLOGY

## 2020-12-23 PROCEDURE — 77412 RADIATION TX DELIVERY LVL 3: CPT | Performed by: RADIOLOGY

## 2020-12-23 PROCEDURE — 77334 RADIATION TREATMENT AID(S): CPT | Mod: 26 | Performed by: RADIOLOGY

## 2020-12-23 PROCEDURE — 77307 TELETHX ISODOSE PLAN CPLX: CPT | Mod: 26 | Performed by: RADIOLOGY

## 2020-12-23 RX ORDER — OXYCODONE HYDROCHLORIDE 5 MG/1
10 CAPSULE ORAL EVERY 6 HOURS PRN
Qty: 120 CAPSULE | Refills: 0 | Status: SHIPPED | OUTPATIENT
Start: 2020-12-23 | End: 2021-01-12

## 2020-12-23 NOTE — PROGRESS NOTES
[facility], [place]  Radiation Oncology Follow-up Note  Dec 23, 2020    Teresa Sanderson  MRN: [id number]  : 1975    DISEASE: Stage IV breast cancer with bone metastases     HPI:  The patient is a 45 year old woman with newly diagnosed metastatic breast cancer. She was initially seen by Dr. Kebede as an inpatient on 2020.     Briefly, the patient has a complex social history and history of left breast mass (no biopsy). She recently presented to the ED 20 and was found to have lytic lesions of T7 and L4 on CT chest. MR lumbar spine showed bone involvement of L4 with right paraspinal mass (without epidural involvement). Radiation Oncology was consulted, and establishing a diagnosis was recommended prior to initiation of treatment. The patient then proceeded with  US left breast biopsy on 20, and pathology showed invasive ductal carcinoma, [place] grade 3, ER+ 90%, [place]+ 75%, HER2 pending.     She established care with Dr. Plunkett yesterday on 20. During this time, she reported sharp low back pain associated with mild sensory loss of lower extremities. She was thus referred back here to discuss palliative radiation therapy to the lumbar spine.    Today her pain is very severe.  It radiates down her back . She has no bowel or bladder changes    PREGNANCY STATUS: Not pregnant, currently menstruating.     OBJECTIVE:   There were no vitals taken for this visit.  GENERAL:  Appears well, in no acute distress.   CV: Good distal perfusion.   RESP: Breathing comfortably on room air.  SKIN: No rashes seen on exposed skin.    IMPRESSION: The patient is a 45 year old woman with stage IV breast cancer and low back pain corresponding with known metastatic L4 lesion. She presents today to discuss palliative radiation therapy.      RECOMMENDATIONS:     Palliative radiation therapy to the lumbar spine was recommended. Potential risks and benefits of treatment were discussed -  side effects were outlined in the consent form, which include fatigue and diarrhea. The patient decided to proceed with treatment and informed consent was obtained. She will undergo CT simulation this morning and start the first fraction this afternoon. We will plan to treat with 30 Gy in 10 fractions.       Cheryl Barnesgautamleonardo  [phone] pager      CC  Patient Care Team:  No Ref-Primary, Physician as PCP - General  Jahaira Plunkett MD as MD (Hematology & Oncology)  Viry Reyes PA-C as Referring Physician (Hematology & Oncology)

## 2020-12-23 NOTE — LETTER
2020         RE: Teresa Sanderson  [address] [place]  [place] [address]        Dear Colleague,    Thank you for referring your patient, Teresa Sanderson, to the [facility] RADIATION ONCOLOGY. Please see a copy of my visit note below.    [facility], [place]  Radiation Oncology Follow-up Note  Dec 23, 2020    Teresa Sanderson  MRN: [id number]  : 1975    DISEASE: Stage IV breast cancer with bone metastases     HPI:  The patient is a 45 year old woman with newly diagnosed metastatic breast cancer. She was initially seen by Dr. Kebede as an inpatient on 2020.     Briefly, the patient has a complex social history and history of left breast mass (no biopsy). She recently presented to the ED 20 and was found to have lytic lesions of T7 and L4 on CT chest. MR lumbar spine showed bone involvement of L4 with right paraspinal mass (without epidural involvement). Radiation Oncology was consulted, and establishing a diagnosis was recommended prior to initiation of treatment. The patient then proceeded with  US left breast biopsy on 20, and pathology showed invasive ductal carcinoma, [place] grade 3, ER+ 90%, HI+ 75%, HER2 pending.     She established care with Dr. Plunkett yesterday on 20. During this time, she reported sharp low back pain associated with mild sensory loss of lower extremities. She was thus referred back here to discuss palliative radiation therapy to the lumbar spine.    Today her pain is very severe.  It radiates down her back . She has no bowel or bladder changes    PREGNANCY STATUS: Not pregnant, currently menstruating.     OBJECTIVE:   There were no vitals taken for this visit.  GENERAL:  Appears well, in no acute distress.   CV: Good distal perfusion.   RESP: Breathing comfortably on room air.  SKIN: No rashes seen on exposed skin.    IMPRESSION: The patient is a 45 year old woman with stage IV breast cancer and  low back pain corresponding with known metastatic L4 lesion. She presents today to discuss palliative radiation therapy.      RECOMMENDATIONS:     Palliative radiation therapy to the lumbar spine was recommended. Potential risks and benefits of treatment were discussed - side effects were outlined in the consent form, which include fatigue and diarrhea. The patient decided to proceed with treatment and informed consent was obtained. She will undergo CT simulation this morning and start the first fraction this afternoon. We will plan to treat with 30 Gy in 10 fractions.       Cheryl Kramer  [phone] pager      CC  Patient Care Team:  No Ref-Primary, Physician as PCP - General  Jahaira Plunkett MD as MD (Hematology & Oncology)  Viry Reyes PA-C as Referring Physician (Hematology & Oncology)            Again, thank you for allowing me to participate in the care of your patient.        Sincerely,        Cheryl Kramer MD

## 2020-12-24 ENCOUNTER — APPOINTMENT (OUTPATIENT)
Dept: RADIATION ONCOLOGY | Facility: CLINIC | Age: 45
End: 2020-12-24
Attending: RADIOLOGY
Payer: MEDICAID

## 2020-12-24 LAB
COPATH REPORT: NORMAL
COPATH REPORT: NORMAL

## 2020-12-24 PROCEDURE — 77014 PR CT GUIDE FOR PLACEMENT RADIATION THERAPY FIELDS: CPT | Mod: 26 | Performed by: RADIOLOGY

## 2020-12-24 PROCEDURE — 77412 RADIATION TX DELIVERY LVL 3: CPT | Performed by: RADIOLOGY

## 2020-12-24 PROCEDURE — 77387 GUIDANCE FOR RADJ TX DLVR: CPT | Performed by: RADIOLOGY

## 2020-12-28 ENCOUNTER — OFFICE VISIT (OUTPATIENT)
Dept: RADIATION ONCOLOGY | Facility: CLINIC | Age: 45
End: 2020-12-28
Attending: RADIOLOGY
Payer: MEDICAID

## 2020-12-28 DIAGNOSIS — C79.51 BONE METASTASIS: Primary | ICD-10-CM

## 2020-12-28 PROCEDURE — 77387 GUIDANCE FOR RADJ TX DLVR: CPT | Performed by: RADIOLOGY

## 2020-12-28 PROCEDURE — 77412 RADIATION TX DELIVERY LVL 3: CPT | Performed by: RADIOLOGY

## 2020-12-28 PROCEDURE — 77014 PR CT GUIDE FOR PLACEMENT RADIATION THERAPY FIELDS: CPT | Mod: 26 | Performed by: RADIOLOGY

## 2020-12-28 NOTE — PROGRESS NOTES
[facility]YS[facility]ANS  SPECIALIZING IN BREAKTHROUGHS  Radiation Oncology    On Treatment Visit Note      Teresa Sanderson      Date: Dec 28, 2020   MRN: [id number]   : 1975    Diagnosis: Breast cancer    Reason for Visit:  On Radiation Treatment Visit     Treatment Summary to Date  Treatment Site: Lumbar spine Current Dose: 900/3000 cGy Fractions: 3/10        Subjective: The patient is doing well with radiation therapy. Back pain is intermittently controlled with oxycodone and morphine, depending on when she takes the medication. Bowel movements are normal.     Nursing ROS:   Nutrition Alteration  Diet Type: Patient's Preference  Nutrition Note: appetite comes/goes  Skin  Skin Reaction: 0 - No changes     Gastrointestinal  Nausea: 0 - None  Constipation NCI: 1 - Occasional or intermittent s/sx  GI Note: Using clearlax with good results     Psychosocial  Pyschosocial Note: doing well  Pain Assessment  Pain Note: See note above. Pt has control of pain when she stays on top of her medications. In the am pain can be severe r/t no medication while sleeping then wakes with extreme pain.    Treatment Breaks: None  ED visits / Hospitalizations: None    Objective:   There were no vitals taken for this visit.  Gen: Appears well, in no acute distress  Skin: No erythema    Labs:  Lab Results   Component Value Date    WBC 11.1 (H) 12/10/2020    HGB 11.8 12/10/2020    HCT 38.2 12/10/2020    MCV 89 12/10/2020     12/10/2020     Lab Results   Component Value Date     12/10/2020    POTASSIUM 4.0 12/10/2020    CHLORIDE 105 12/10/2020    CO2 27 12/10/2020     (H) 12/10/2020        Assessment:    Tolerating radiation therapy well.  All questions and concerns addressed.    Toxicities:  Fatigue: Grade 0: No toxicity  Dermatitis: Grade 0: No toxicity     Plan:   1. Continue current therapy  2. Discussed that MS Contin can be scheduled every 12 hours and oxycodone may be used for breakthrough pain  PRN      Mosaiq chart and setup information reviewed  MVCT/IGRT images checked    Medication Review  Med Note: No changes per pt    Educational Topic Discussed  Education Instructions: reviewed    Heaven Carrillo MD PGY-2  Radiation Oncology  [facility]      I saw and examined the patient with the resident.  I have reviewed and edited the resident's note and agree with the plan of care.            Tesha Kebede MD

## 2020-12-28 NOTE — LETTER
Date:December 30, 2020      Provider requested that no letter be sent. Do not send.       [facility] Physicians Health Information

## 2020-12-28 NOTE — LETTER
2020         RE: Teresa Sanderson  [address] [place]  [place] [address]        Dear Colleague,    Thank you for referring your patient, Teresa Sanderson, to the [facility] RADIATION ONCOLOGY. Please see a copy of my visit note below.    [facility]YSICIANS  SPECIALIZING IN BREAKTHROUGHS  Radiation Oncology    On Treatment Visit Note      Teresa Sanderson      Date: Dec 28, 2020   MRN: [id number]   : 1975    Diagnosis: Breast cancer    Reason for Visit:  On Radiation Treatment Visit     Treatment Summary to Date  Treatment Site: Lumbar spine Current Dose: 900/3000 cGy Fractions: 3/10        Subjective: The patient is doing well with radiation therapy. Back pain is intermittently controlled with oxycodone and morphine, depending on when she takes the medication. Bowel movements are normal.     Nursing ROS:   Nutrition Alteration  Diet Type: Patient's Preference  Nutrition Note: appetite comes/goes  Skin  Skin Reaction: 0 - No changes     Gastrointestinal  Nausea: 0 - None  Constipation NCI: 1 - Occasional or intermittent s/sx  GI Note: Using clearlax with good results     Psychosocial  Pyschosocial Note: doing well  Pain Assessment  Pain Note: See note above. Pt has control of pain when she stays on top of her medications. In the am pain can be severe r/t no medication while sleeping then wakes with extreme pain.    Treatment Breaks: None  ED visits / Hospitalizations: None    Objective:   There were no vitals taken for this visit.  Gen: Appears well, in no acute distress  Skin: No erythema    Labs:  Lab Results   Component Value Date    WBC 11.1 (H) 12/10/2020    HGB 11.8 12/10/2020    HCT 38.2 12/10/2020    MCV 89 12/10/2020     12/10/2020     Lab Results   Component Value Date     12/10/2020    POTASSIUM 4.0 12/10/2020    CHLORIDE 105 12/10/2020    CO2 27 12/10/2020     (H) 12/10/2020        Assessment:    Tolerating radiation therapy  well.  All questions and concerns addressed.    Toxicities:  Fatigue: Grade 0: No toxicity  Dermatitis: Grade 0: No toxicity     Plan:   1. Continue current therapy  2. Discussed that MS Contin can be scheduled every 12 hours and oxycodone may be used for breakthrough pain PRN      Mosaiq chart and setup information reviewed  MVCT/IGRT images checked    Medication Review  Med Note: No changes per pt    Educational Topic Discussed  Education Instructions: reviewed    Heaven Carrillo MD PGY-2  Radiation Oncology  [facility]      I saw and examined the patient with the resident.  I have reviewed and edited the resident's note and agree with the plan of care.            Tesha Kebede MD        Again, thank you for allowing me to participate in the care of your patient.        Sincerely,        Tesha Kebede MD

## 2020-12-29 ENCOUNTER — APPOINTMENT (OUTPATIENT)
Dept: RADIATION ONCOLOGY | Facility: CLINIC | Age: 45
End: 2020-12-29
Attending: RADIOLOGY
Payer: MEDICAID

## 2020-12-29 PROCEDURE — 77412 RADIATION TX DELIVERY LVL 3: CPT | Performed by: RADIOLOGY

## 2020-12-29 PROCEDURE — 77387 GUIDANCE FOR RADJ TX DLVR: CPT | Performed by: RADIOLOGY

## 2020-12-29 PROCEDURE — 77014 PR CT GUIDE FOR PLACEMENT RADIATION THERAPY FIELDS: CPT | Mod: 26 | Performed by: RADIOLOGY

## 2020-12-30 ENCOUNTER — APPOINTMENT (OUTPATIENT)
Dept: RADIATION ONCOLOGY | Facility: CLINIC | Age: 45
End: 2020-12-30
Attending: RADIOLOGY
Payer: MEDICAID

## 2020-12-30 PROCEDURE — 77412 RADIATION TX DELIVERY LVL 3: CPT | Performed by: RADIOLOGY

## 2020-12-30 PROCEDURE — 77427 RADIATION TX MANAGEMENT X5: CPT | Performed by: RADIOLOGY

## 2020-12-30 PROCEDURE — 77387 GUIDANCE FOR RADJ TX DLVR: CPT | Performed by: RADIOLOGY

## 2020-12-30 PROCEDURE — 77336 RADIATION PHYSICS CONSULT: CPT | Performed by: RADIOLOGY

## 2020-12-30 PROCEDURE — 77014 PR CT GUIDE FOR PLACEMENT RADIATION THERAPY FIELDS: CPT | Mod: 26 | Performed by: RADIOLOGY

## 2020-12-31 ENCOUNTER — APPOINTMENT (OUTPATIENT)
Dept: RADIATION ONCOLOGY | Facility: CLINIC | Age: 45
End: 2020-12-31
Attending: RADIOLOGY
Payer: MEDICAID

## 2020-12-31 PROCEDURE — 77412 RADIATION TX DELIVERY LVL 3: CPT | Performed by: RADIOLOGY

## 2020-12-31 PROCEDURE — 99207 PR SERVICE NOT STAFFED W/SUPERV PROV: CPT | Performed by: RADIOLOGY

## 2020-12-31 PROCEDURE — 77387 GUIDANCE FOR RADJ TX DLVR: CPT | Performed by: RADIOLOGY

## 2021-01-04 ENCOUNTER — OFFICE VISIT (OUTPATIENT)
Dept: RADIATION ONCOLOGY | Facility: CLINIC | Age: 46
End: 2021-01-04
Attending: RADIOLOGY
Payer: COMMERCIAL

## 2021-01-04 VITALS — BODY MASS INDEX: 31.74 KG/M2 | WEIGHT: 234 LBS

## 2021-01-04 DIAGNOSIS — C79.51 SPINE METASTASIS: Primary | ICD-10-CM

## 2021-01-04 PROCEDURE — 77412 RADIATION TX DELIVERY LVL 3: CPT | Performed by: RADIOLOGY

## 2021-01-04 PROCEDURE — 77387 GUIDANCE FOR RADJ TX DLVR: CPT | Performed by: RADIOLOGY

## 2021-01-04 PROCEDURE — 77014 PR CT GUIDE FOR PLACEMENT RADIATION THERAPY FIELDS: CPT | Mod: 26 | Performed by: RADIOLOGY

## 2021-01-04 NOTE — LETTER
2021         RE: Teresa Sanderson  [address] [place]  [facility] [address]        Dear Colleague,    Thank you for referring your patient, Teresa Sanderson, to the [facility] RADIATION ONCOLOGY. Please see a copy of my visit note below.    [facility] PHYSICIANS  SPECIALIZING IN BREAKTHROUGHS  Radiation Oncology    On Treatment Visit Note      Teresa Sanderson      Date: 2021   MRN: [id number]   : 1975    Diagnosis: Breast cancer    Reason for Visit:  On Radiation Treatment Visit     Treatment Summary to Date  Treatment Site: Lumbar spine Current Dose: 2100/3000 cGy Fractions7/10        Subjective: The patient is doing well with radiation therapy. Back pain  well controlled with oxycodone and morphine, depending on when she takes the medication. Bowel movements are normal.   She is much improved.    Nursing ROS:   Nutrition Alteration  Diet Type: Patient's Preference  Nutrition Note: appetite comes/goes  Skin  Skin Reaction: 0 - No changes     Gastrointestinal  Nausea: 0 - None  Constipation NCI: 1 - Occasional or intermittent s/sx  GI Note: Using clearlax with good results     Psychosocial  Pyschosocial Note: doing well  Pain Assessment     Treatment Breaks: None  ED visits / Hospitalizations: None    Objective:   Wt 106.1 kg (234 lb)   BMI 31.74 kg/m    Gen: Appears well, in no acute distress  Skin: No erythema      Assessment:    Tolerating radiation therapy well.  All questions and concerns addressed.    Toxicities:  Fatigue: Grade 0: No toxicity  Dermatitis: Grade 0: No toxicity     Plan:   1. Continue current therapy  2. Discussed that MS Contin can be scheduled every 12 hours and oxycodone may be used for breakthrough pain PRN  3. Follow up with me or NP in 1 month by phone visit.      Mosaiq chart and setup information reviewed  MVCT/IGRT images checked    Medication Review  Med Note: No changes per pt        TERESA Kramer MD      Again, thank you for  allowing me to participate in the care of your patient.        Sincerely,        Cheryl Kramer MD

## 2021-01-04 NOTE — PROGRESS NOTES
[facility] PHYSICIANS  SPECIALIZING IN BREAKTHROUGHS  Radiation Oncology    On Treatment Visit Note      Teresa Sanderson      Date: 2021   MRN: [id number]   : 1975    Diagnosis: Breast cancer    Reason for Visit:  On Radiation Treatment Visit     Treatment Summary to Date  Treatment Site: Lumbar spine Current Dose: 2100/3000 cGy Fractions7/10        Subjective: The patient is doing well with radiation therapy. Back pain  well controlled with oxycodone and morphine, depending on when she takes the medication. Bowel movements are normal.   She is much improved.    Nursing ROS:   Nutrition Alteration  Diet Type: Patient's Preference  Nutrition Note: appetite comes/goes  Skin  Skin Reaction: 0 - No changes     Gastrointestinal  Nausea: 0 - None  Constipation NCI: 1 - Occasional or intermittent s/sx  GI Note: Using clearlax with good results     Psychosocial  Pyschosocial Note: doing well  Pain Assessment     Treatment Breaks: None  ED visits / Hospitalizations: None    Objective:   Wt 106.1 kg (234 lb)   BMI 31.74 kg/m    Gen: Appears well, in no acute distress  Skin: No erythema      Assessment:    Tolerating radiation therapy well.  All questions and concerns addressed.    Toxicities:  Fatigue: Grade 0: No toxicity  Dermatitis: Grade 0: No toxicity     Plan:   1. Continue current therapy  2. Discussed that MS Contin can be scheduled every 12 hours and oxycodone may be used for breakthrough pain PRN  3. Follow up with me or NP in 1 month by phone visit.      Mosaiq chart and setup information reviewed  MVCT/IGRT images checked    Medication Review  Med Note: No changes per pt        TERESA Kramer MD

## 2021-01-04 NOTE — LETTER
2021       RE: Teresa Sanderson  [address] [place]  [facility] [address]     Dear Colleague,    Thank you for referring your patient, Teresa Sanderson, to the [facility] RADIATION ONCOLOGY at [facility]. Please see a copy of my visit note below.    [facility] PHYSICIANS  SPECIALIZING IN BREAKTHROUGHS  Radiation Oncology    On Treatment Visit Note      Teresa Sanderson      Date: 2021   MRN: [id number]   : 1975    Diagnosis: Breast cancer    Reason for Visit:  On Radiation Treatment Visit     Treatment Summary to Date  Treatment Site: Lumbar spine Current Dose: 2100/3000 cGy Fractions7/10        Subjective: The patient is doing well with radiation therapy. Back pain  well controlled with oxycodone and morphine, depending on when she takes the medication. Bowel movements are normal.   She is much improved.    Nursing ROS:   Nutrition Alteration  Diet Type: Patient's Preference  Nutrition Note: appetite comes/goes  Skin  Skin Reaction: 0 - No changes     Gastrointestinal  Nausea: 0 - None  Constipation NCI: 1 - Occasional or intermittent s/sx  GI Note: Using clearlax with good results     Psychosocial  Pyschosocial Note: doing well  Pain Assessment     Treatment Breaks: None  ED visits / Hospitalizations: None    Objective:   Wt 106.1 kg (234 lb)   BMI 31.74 kg/m    Gen: Appears well, in no acute distress  Skin: No erythema      Assessment:    Tolerating radiation therapy well.  All questions and concerns addressed.    Toxicities:  Fatigue: Grade 0: No toxicity  Dermatitis: Grade 0: No toxicity     Plan:   1. Continue current therapy  2. Discussed that MS Contin can be scheduled every 12 hours and oxycodone may be used for breakthrough pain PRN  3. Follow up with me or NP in 1 month by phone visit.      Mosaiq chart and setup information reviewed  MVCT/IGRT images checked    Medication Review  Med Note: No changes per  sj Kramer MD

## 2021-01-05 ENCOUNTER — APPOINTMENT (OUTPATIENT)
Dept: RADIATION ONCOLOGY | Facility: CLINIC | Age: 46
End: 2021-01-05
Attending: RADIOLOGY
Payer: COMMERCIAL

## 2021-01-05 PROCEDURE — 77412 RADIATION TX DELIVERY LVL 3: CPT | Performed by: RADIOLOGY

## 2021-01-05 PROCEDURE — 77014 PR CT GUIDE FOR PLACEMENT RADIATION THERAPY FIELDS: CPT | Mod: 26 | Performed by: RADIOLOGY

## 2021-01-05 PROCEDURE — 77387 GUIDANCE FOR RADJ TX DLVR: CPT | Performed by: RADIOLOGY

## 2021-01-06 ENCOUNTER — PATIENT OUTREACH (OUTPATIENT)
Dept: CARE COORDINATION | Facility: CLINIC | Age: 46
End: 2021-01-06

## 2021-01-06 ENCOUNTER — APPOINTMENT (OUTPATIENT)
Dept: RADIATION ONCOLOGY | Facility: CLINIC | Age: 46
End: 2021-01-06
Attending: RADIOLOGY
Payer: COMMERCIAL

## 2021-01-06 PROCEDURE — 77387 GUIDANCE FOR RADJ TX DLVR: CPT | Performed by: RADIOLOGY

## 2021-01-06 PROCEDURE — 77412 RADIATION TX DELIVERY LVL 3: CPT | Performed by: RADIOLOGY

## 2021-01-06 PROCEDURE — 77014 PR CT GUIDE FOR PLACEMENT RADIATION THERAPY FIELDS: CPT | Mod: 26 | Performed by: RADIOLOGY

## 2021-01-07 ENCOUNTER — APPOINTMENT (OUTPATIENT)
Dept: RADIATION ONCOLOGY | Facility: CLINIC | Age: 46
End: 2021-01-07
Attending: RADIOLOGY
Payer: COMMERCIAL

## 2021-01-07 PROCEDURE — 77336 RADIATION PHYSICS CONSULT: CPT | Performed by: RADIOLOGY

## 2021-01-07 PROCEDURE — 77427 RADIATION TX MANAGEMENT X5: CPT | Performed by: RADIOLOGY

## 2021-01-07 PROCEDURE — 77412 RADIATION TX DELIVERY LVL 3: CPT | Performed by: RADIOLOGY

## 2021-01-07 PROCEDURE — 77387 GUIDANCE FOR RADJ TX DLVR: CPT | Performed by: RADIOLOGY

## 2021-01-07 PROCEDURE — 77014 PR CT GUIDE FOR PLACEMENT RADIATION THERAPY FIELDS: CPT | Mod: 26 | Performed by: RADIOLOGY
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
## 2021-01-09 ENCOUNTER — ONCOLOGY VISIT (OUTPATIENT)
Dept: RADIATION ONCOLOGY | Facility: CLINIC | Age: 46
End: 2021-01-09

## 2021-01-10 PROBLEM — Z17.0 MALIGNANT NEOPLASM OF OVERLAPPING SITES OF LEFT BREAST IN FEMALE, ESTROGEN RECEPTOR POSITIVE (H): Status: ACTIVE | Noted: 2021-01-10

## 2021-01-10 PROBLEM — C50.912 CARCINOMA OF LEFT BREAST METASTATIC TO BONE (H): Status: ACTIVE | Noted: 2021-01-10

## 2021-01-10 PROBLEM — C50.812 MALIGNANT NEOPLASM OF OVERLAPPING SITES OF LEFT BREAST IN FEMALE, ESTROGEN RECEPTOR POSITIVE (H): Status: ACTIVE | Noted: 2021-01-10

## 2021-01-10 PROBLEM — C79.51 CARCINOMA OF LEFT BREAST METASTATIC TO BONE (H): Status: ACTIVE | Noted: 2021-01-10

## 2021-01-10 RX ORDER — ZOLEDRONIC ACID 0.04 MG/ML
4 INJECTION, SOLUTION INTRAVENOUS ONCE
Status: CANCELLED | OUTPATIENT
Start: 2021-01-15 | End: 2021-01-15

## 2021-01-10 RX ORDER — HEPARIN SODIUM,PORCINE 10 UNIT/ML
5 VIAL (ML) INTRAVENOUS
Status: CANCELLED | OUTPATIENT
Start: 2021-01-15

## 2021-01-10 RX ORDER — HEPARIN SODIUM (PORCINE) LOCK FLUSH IV SOLN 100 UNIT/ML 100 UNIT/ML
5 SOLUTION INTRAVENOUS
Status: CANCELLED | OUTPATIENT
Start: 2021-01-15

## 2021-01-11 ENCOUNTER — DOCUMENTATION ONLY (OUTPATIENT)
Dept: ONCOLOGY | Facility: CLINIC | Age: 46
End: 2021-01-11

## 2021-01-11 ENCOUNTER — TELEPHONE (OUTPATIENT)
Dept: ONCOLOGY | Facility: CLINIC | Age: 46
End: 2021-01-11

## 2021-01-11 NOTE — PROGRESS NOTES
Teresa is a 45 year old who is being evaluated via a billable video visit.      How would you like to obtain your AVS? MyChart  If the video visit is dropped, the invitation should be resent by: Text to cell phone: [phone]  Will anyone else be joining your video visit? No    Vitals - Patient Reported  Weight (Patient Reported): 106.1 kg (234 lb)  Height (Patient Reported): 182.9 cm (6')  BMI (Based on Pt Reported Ht/Wt): 31.74  Pain Score: Moderate Pain (5)  Pain Loc: (PATIENT REPORTS PAIN IN RIGHT LOWER BACK SHOOT DOWN LEFT LEG)      I have reviewed and updated patient's allergy and medication list.    Concerns: NEED REFILLS  Refills: OXYCODONE, RIVAROXABAN, SEROQUEL, DEXAMETHASONE, MORPHINE, PANTOPRAZOLE, METHOCARBAMOL, ORDANSETRON, SERTRALINE      Adina Chavez CMA    Video-Visit Details    Type of service:  Video Visit    Total time of video visit:  40 minutes, an additional 20 minutes was spent reviewing patients labs and imaging, coordinating patient care, and in documentation.    Originating Location (pt. Location): Home    Distant Location (provider location):  [facility]     Platform used for Video Visit: [facility]    Oncology Visit:   Date on this visit: 1/12/2021    Diagnosis:  ER positive left breast cancer metastasized to bones.     Primary Physician: No Ref-Primary, Physician     History Of Present Illness:    Ms. Sanderson is a 45 year old female with a h/o tobacco abuse and DVTs with left breast cancer metastasized to bone. She presented to [facility] ED with back pain on 12/5/2020. MRI of the L-spine showed an abnormal L4 lesion with associated right paraspinal mass, abnormalities in L5 and the left iliac bone were also seen. CT C/A/P showed a left breast mass, lytic lesions of T7, L4, and the pelvis, and a 3 cm lesion in the kidney (thought to be a cyst). Ultrasound of the bilateral lower extremities showed a non-occlusive thrombus in the left popliteal vein.  "Mammogram and ultrasound of the bilateral breasts on 12/17/2020 showed a spiculated mass measuring at least 7.8 cm at 12-1:00 left breast extending from the nipple to 9 cm from the nipple with associated nipple retraction. This mass was biopsied, and showed IDC with surrounding DCIS, grade 3, ER+ 90%, and NC+ 75%.  HER2 was equivocal in approximately 35% of tumor cells by FISH and was negative by IHC.    Metastatic Breast Cancer Treatment:  12/23/2021 - 1/7/2021  Radiation (3000 cGy) to the lumbar spine.    Interval History:  Ms. Sanderson was seen via video visit today for metastatic breast cancer follow-up.  Since last visit she completed radiation to the lumbar spine.  She reports she has had some improvement in back pain with this treatment.  However, she reports ongoing significant pain in the area of the low back.  She has been taking MS Contin one 15 mg tablet twice a day.  She also has been taking oxycodone two 5 mg tablets once every 6 hours scheduled around-the-clock.  She states she has run out of oxycodone and morphine tablets and needs a refill of these.  She also needs a refill of a number of other medications.  Her pain continues to radiate down the left leg.  She states that it feels \"like a rolling knot\".  She denies numbness or weakness.  She has also had some cramping type symptoms in her right hand mainly in the thumb and her index finger.  She has had no fevers, chills, cough, shortness of breath, or chest pain.  She denies current abdominal complaints.  She denies increased swelling of her extremities.  She has ongoing anxiety which is chronic but perhaps slightly increased from prior.  The remainder of a complete 12 point review of systems is reviewed with patient was negative with exception of that mentioned above.      Past Medical/Surgical History:   Past Medical History:   Diagnosis Date     Anxiety      Depression      DVT (deep venous thrombosis) (H) 2014     Left breast mass     x " approximately 4-5 months     Pulmonary emboli (H)      Pyelonephritis      Schizoaffective disorder (H)      Tobacco use      Past Surgical History:   Procedure Laterality Date     TUBAL LIGATION  1998      No Known Allergies    Current Outpatient Medications   Medication     dexamethasone (DECADRON) 4 MG tablet     Lidocaine (LIDOCARE) 4 % Patch     methocarbamol (ROBAXIN) 500 MG tablet     morphine (MS CONTIN) 15 MG CR tablet     nicotine (NICODERM CQ) 14 MG/24HR 24 hr patch     ondansetron (ZOFRAN-ODT) 4 MG ODT tab     oxyCODONE (OXY-IR) 5 MG capsule     pantoprazole (PROTONIX) 40 MG EC tablet     polyethylene glycol (MIRALAX) 17 GM/Dose powder     QUEtiapine (SEROQUEL) 100 MG tablet     rivaroxaban ANTICOAGULANT (XARELTO) 15 MG TABS tablet     sertraline (ZOLOFT) 50 MG tablet     No current facility-administered medications for this visit.    '    Family and Social History:   Please see initial consultation dated 12/22/2020 for further details.    Physical Exam:   General: Adult female in NAD.  Alert and oriented.  Eyes: No erythema or discharge   Respiratory: Breathing comfortably on room air. No wheezing or distress.   Musculoskeletal: Full ROM of the bilateral upper extremities.   Skin: No visible concerning skin rashes or lesions   Neuro: No notable tremor and dyskinetic movements.   Psych: Mood and affect appear normal.     The rest of a comprehensive physical examination is deferred due to PHE (public health emergency) video visit restrictions.     Laboratory/Imaging Studies   12/17/2020 HER2:  Approximately 35% of tumor cells with average number of HER2 signals per nucleus of 5.0 and a HER2/VITO-17 ratio of 1.4.    HER2 IHC = 1+ (negative)    ASSESSMENT/PLAN:   45 year old female with history of DVT with left breast invasive ductal carcinoma, ER/KY positive, HER2 negative metastasized to bones.    1.  Metastatic breast cancer:  She has now completed palliative radiation to the lumbar spine.  I recommend  starting treatment with Ibrance, zoladex, and anastrozole at this time.  We reviewed Ibrance is taken as a pill daily for three weeks on, followed by one week off.  The most common side effect of Ibrance is low blood counts and she will need labs every 2 weeks for the first 2 months to monitor blood counts.  Anastrozole is a pill taken daily continuously.  Zoladex is administered as a monthly injection.  We reviewed the side effects of ovarian suppression and anastrozole including but not limited to fatigue, hot flashes, muscle aches/joint pains, joint stiffness, worsening osteoporosis, mood instability/changes, and thinning of hair. She is agreeable to starting this treatment at this time.  We will schedule her for Zoladex within 1 week.  She should start Ibrance and anastrozole the same day.    2.  Bone metastases:  She is s/p XRT to the lumbar spine.  She reports ongoing pain and continues to take MS contin 15 mg PO BID and oxycodone prn.  We discussed pain will likely improve with initiation of breast cancer therapy and it will be important to wean her off of these medications.  I would like her to decrease oxycodone dosing to 10 mg PO q8 hours at this time.  Of note, she requires refills of both MS contin and oxycodone.  She was previously given a one month supply and therefore cannot refill them until next week.  She is okay with this and will supplement with tylenol and ibuprofen as needed in the meantime.  Per her insurance company, from here forward, can only receive a 7 day supply at a time.      Recommend weaning off of dexamethasone as well.  We discussed adverse effects of dexamethasone including moon facies, hyperglycemia, and insomnia.  I have asked that she take 4 mg PO q8 hours x 1 week, followed by 4 mg BID x 1 week, followed by 4 mg daily x 1 week.    I also recommend starting Zometa IV once every 3 months at this time.  We reviewed the role of Zometa in preventing adverse skeletal events and in  treatment of bone metastases.  We again reviewed the potential side effects including flu like symptoms after infusion, fatigue, dizziness, headache, and osteonecrosis of the jaw.  She recently lost a crown.  She doesn't have a dentist, I will therefore place referral.  Plan to give first Zometa infusion at the same time as Zoladex injection.    3.  DVT:  Recommend continuing Xarelto until contraindicated given metastatic disease.  This medication was refilled today.    4.  Hot flashes/anxiety:  Continue Zoloft 50 mg PO daily and Seroquel 100 mg PO at bedtime.  Both medications were refilled today.    5.  Follow Up:  Labs, Zoladex injection and Zometa infusion within 1 week.  Labs again 2 weeks later.  Labs, ALEKSANDRA visit, and zoladex injection 4 weeks after initial Zoladex.  Dental consultation within 1-2 months.

## 2021-01-11 NOTE — TELEPHONE ENCOUNTER
PA Initiation    Medication: IBRANCE - PA SUBMITTED - (Key: [id number])  Insurance Company: [employer] - Phone [phone] Fax [phone]  Start Date: 1/11/2021    [facility]   Oncology Pharmacy Liaison  [email or web address]   [phone] (phone)   [phone] (fax)

## 2021-01-12 ENCOUNTER — VIRTUAL VISIT (OUTPATIENT)
Dept: ONCOLOGY | Facility: CLINIC | Age: 46
End: 2021-01-12
Attending: INTERNAL MEDICINE
Payer: COMMERCIAL

## 2021-01-12 DIAGNOSIS — C50.912 CARCINOMA OF LEFT BREAST METASTATIC TO BONE (H): ICD-10-CM

## 2021-01-12 DIAGNOSIS — Z17.0 MALIGNANT NEOPLASM OF OVERLAPPING SITES OF LEFT BREAST IN FEMALE, ESTROGEN RECEPTOR POSITIVE (H): Primary | ICD-10-CM

## 2021-01-12 DIAGNOSIS — C50.812 MALIGNANT NEOPLASM OF OVERLAPPING SITES OF LEFT BREAST IN FEMALE, ESTROGEN RECEPTOR POSITIVE (H): Primary | ICD-10-CM

## 2021-01-12 DIAGNOSIS — C79.51 CARCINOMA OF LEFT BREAST METASTATIC TO BONE (H): ICD-10-CM

## 2021-01-12 DIAGNOSIS — C79.51 SPINE METASTASIS: ICD-10-CM

## 2021-01-12 PROCEDURE — 99215 OFFICE O/P EST HI 40 MIN: CPT | Mod: 95 | Performed by: INTERNAL MEDICINE

## 2021-01-12 RX ORDER — DEXAMETHASONE 4 MG/1
TABLET ORAL
Qty: 42 TABLET | Refills: 0 | Status: SHIPPED | OUTPATIENT
Start: 2021-01-12 | End: 2021-03-03

## 2021-01-12 RX ORDER — MORPHINE SULFATE 15 MG/1
15 TABLET, FILM COATED, EXTENDED RELEASE ORAL EVERY 12 HOURS
Qty: 60 TABLET | Refills: 0 | Status: SHIPPED | OUTPATIENT
Start: 2021-01-18 | End: 2021-01-13

## 2021-01-12 RX ORDER — QUETIAPINE FUMARATE 100 MG/1
100 TABLET, FILM COATED ORAL AT BEDTIME
Qty: 30 TABLET | Refills: 1 | Status: SHIPPED | OUTPATIENT
Start: 2021-01-12 | End: 2021-03-16

## 2021-01-12 RX ORDER — PANTOPRAZOLE SODIUM 40 MG/1
40 TABLET, DELAYED RELEASE ORAL
Qty: 30 TABLET | Refills: 1 | Status: SHIPPED | OUTPATIENT
Start: 2021-01-12 | End: 2021-03-16

## 2021-01-12 RX ORDER — OXYCODONE HYDROCHLORIDE 5 MG/1
10 CAPSULE ORAL EVERY 8 HOURS PRN
Qty: 180 CAPSULE | Refills: 0 | Status: SHIPPED | OUTPATIENT
Start: 2021-01-18 | End: 2021-01-13

## 2021-01-12 RX ORDER — METHOCARBAMOL 500 MG/1
500 TABLET, FILM COATED ORAL 4 TIMES DAILY PRN
Qty: 60 TABLET | Refills: 0 | Status: SHIPPED | OUTPATIENT
Start: 2021-01-12 | End: 2021-03-03 | Stop reason: ALTCHOICE

## 2021-01-12 NOTE — TELEPHONE ENCOUNTER
Prior Authorization Approval    Authorization Effective Date: 1/12/2021  Authorization Expiration Date: 1/11/2022  Medication: IBRANCE - PA APPROVED - (Key: [id number])  Approved Dose/Quantity:21/28  Reference #: (Key: [id number])   Insurance Company: [employer] - Phone [phone] Fax [phone]  Expected CoPay: $0     Which Pharmacy is filling the prescription (Not needed for infusion/clinic administered): [facility] [address] [facility]  [facility]acy Notified:    Patient Notified: Yes, [facility]   Oncology Pharmacy Liaison  [email or web address]   [phone] (phone)   [phone] (fax)

## 2021-01-12 NOTE — LETTER
1/12/2021      RE: Teresa Sanderson  [address] [place]  [facility] [address]       Teresa is a 45 year old who is being evaluated via a billable video visit.      How would you like to obtain your AVS? MyChart  If the video visit is dropped, the invitation should be resent by: Text to cell phone: [phone]  Will anyone else be joining your video visit? No    Vitals - Patient Reported  Weight (Patient Reported): 106.1 kg (234 lb)  Height (Patient Reported): 182.9 cm (6')  BMI (Based on Pt Reported Ht/Wt): 31.74  Pain Score: Moderate Pain (5)  Pain Loc: (PATIENT REPORTS PAIN IN RIGHT LOWER BACK SHOOT DOWN LEFT LEG)      I have reviewed and updated patient's allergy and medication list.    Concerns: NEED REFILLS  Refills: OXYCODONE, RIVAROXABAN, SEROQUEL, DEXAMETHASONE, MORPHINE, PANTOPRAZOLE, METHOCARBAMOL, ORDANSETRON, SERTRALINE      Adina Chavez CMA    Video-Visit Details    Type of service:  Video Visit    Total time of video visit:  40 minutes, an additional 20 minutes was spent reviewing patients labs and imaging, coordinating patient care, and in documentation.    Originating Location (pt. Location): Home    Distant Location (provider location):  [facility]     Platform used for Video Visit: Zollo    Oncology Visit:   Date on this visit: 1/12/2021    Diagnosis:  ER positive left breast cancer metastasized to bones.     Primary Physician: No Ref-Primary, Physician     History Of Present Illness:    Ms. Sanderson is a 45 year old female with a h/o tobacco abuse and DVTs with left breast cancer metastasized to bone. She presented to [place] ED with back pain on 12/5/2020. MRI of the L-spine showed an abnormal L4 lesion with associated right paraspinal mass, abnormalities in L5 and the left iliac bone were also seen. CT C/A/P showed a left breast mass, lytic lesions of T7, L4, and the pelvis, and a 3 cm lesion in the kidney (thought to be a cyst). Ultrasound of the  "bilateral lower extremities showed a non-occlusive thrombus in the left popliteal vein. Mammogram and ultrasound of the bilateral breasts on 12/17/2020 showed a spiculated mass measuring at least 7.8 cm at 12-1:00 left breast extending from the nipple to 9 cm from the nipple with associated nipple retraction. This mass was biopsied, and showed IDC with surrounding DCIS, grade 3, ER+ 90%, and MD+ 75%.  HER2 was equivocal in approximately 35% of tumor cells by FISH and was negative by IHC.    Metastatic Breast Cancer Treatment:  12/23/2021 - 1/7/2021  Radiation (3000 cGy) to the lumbar spine.    Interval History:  Ms. Sanderson was seen via video visit today for metastatic breast cancer follow-up.  Since last visit she completed radiation to the lumbar spine.  She reports she has had some improvement in back pain with this treatment.  However, she reports ongoing significant pain in the area of the low back.  She has been taking MS Contin one 15 mg tablet twice a day.  She also has been taking oxycodone two 5 mg tablets once every 6 hours scheduled around-the-clock.  She states she has run out of oxycodone and morphine tablets and needs a refill of these.  She also needs a refill of a number of other medications.  Her pain continues to radiate down the left leg.  She states that it feels \"like a rolling knot\".  She denies numbness or weakness.  She has also had some cramping type symptoms in her right hand mainly in the thumb and her index finger.  She has had no fevers, chills, cough, shortness of breath, or chest pain.  She denies current abdominal complaints.  She denies increased swelling of her extremities.  She has ongoing anxiety which is chronic but perhaps slightly increased from prior.  The remainder of a complete 12 point review of systems is reviewed with patient was negative with exception of that mentioned above.      Past Medical/Surgical History:   Past Medical History:   Diagnosis Date     Anxiety      " Depression      DVT (deep venous thrombosis) (H) 2014     Left breast mass     x approximately 4-5 months     Pulmonary emboli (H)      Pyelonephritis      Schizoaffective disorder (H)      Tobacco use      Past Surgical History:   Procedure Laterality Date     TUBAL LIGATION  1998      No Known Allergies    Current Outpatient Medications   Medication     dexamethasone (DECADRON) 4 MG tablet     Lidocaine (LIDOCARE) 4 % Patch     methocarbamol (ROBAXIN) 500 MG tablet     morphine (MS CONTIN) 15 MG CR tablet     nicotine (NICODERM CQ) 14 MG/24HR 24 hr patch     ondansetron (ZOFRAN-ODT) 4 MG ODT tab     oxyCODONE (OXY-IR) 5 MG capsule     pantoprazole (PROTONIX) 40 MG EC tablet     polyethylene glycol (MIRALAX) 17 GM/Dose powder     QUEtiapine (SEROQUEL) 100 MG tablet     rivaroxaban ANTICOAGULANT (XARELTO) 15 MG TABS tablet     sertraline (ZOLOFT) 50 MG tablet     No current facility-administered medications for this visit.    '    Family and Social History:   Please see initial consultation dated 12/22/2020 for further details.    Physical Exam:   General: Adult female in NAD.  Alert and oriented.  Eyes: No erythema or discharge   Respiratory: Breathing comfortably on room air. No wheezing or distress.   Musculoskeletal: Full ROM of the bilateral upper extremities.   Skin: No visible concerning skin rashes or lesions   Neuro: No notable tremor and dyskinetic movements.   Psych: Mood and affect appear normal.     The rest of a comprehensive physical examination is deferred due to PHE (public health emergency) video visit restrictions.     Laboratory/Imaging Studies   12/17/2020 HER2:  Approximately 35% of tumor cells with average number of HER2 signals per nucleus of 5.0 and a HER2/VITO-17 ratio of 1.4.    HER2 IHC = 1+ (negative)    ASSESSMENT/PLAN:   45 year old female with history of DVT with left breast invasive ductal carcinoma, ER/NV positive, HER2 negative metastasized to bones.    1.  Metastatic breast cancer:   She has now completed palliative radiation to the lumbar spine.  I recommend starting treatment with Ibrance, zoladex, and anastrozole at this time.  We reviewed Ibrance is taken as a pill daily for three weeks on, followed by one week off.  The most common side effect of Ibrance is low blood counts and she will need labs every 2 weeks for the first 2 months to monitor blood counts.  Anastrozole is a pill taken daily continuously.  Zoladex is administered as a monthly injection.  We reviewed the side effects of ovarian suppression and anastrozole including but not limited to fatigue, hot flashes, muscle aches/joint pains, joint stiffness, worsening osteoporosis, mood instability/changes, and thinning of hair. She is agreeable to starting this treatment at this time.  We will schedule her for Zoladex within 1 week.  She should start Ibrance and anastrozole the same day.    2.  Bone metastases:  She is s/p XRT to the lumbar spine.  She reports ongoing pain and continues to take MS contin 15 mg PO BID and oxycodone prn.  We discussed pain will likely improve with initiation of breast cancer therapy and it will be important to wean her off of these medications.  I would like her to decrease oxycodone dosing to 10 mg PO q8 hours at this time.  Of note, she requires refills of both MS contin and oxycodone.  She was previously given a one month supply and therefore cannot refill them until next week.  She is okay with this and will supplement with tylenol and ibuprofen as needed in the meantime.  Per her insurance company, from here forward, can only receive a 7 day supply at a time.      Recommend weaning off of dexamethasone as well.  We discussed adverse effects of dexamethasone including moon facies, hyperglycemia, and insomnia.  I have asked that she take 4 mg PO q8 hours x 1 week, followed by 4 mg BID x 1 week, followed by 4 mg daily x 1 week.    I also recommend starting Zometa IV once every 3 months at this time.   We reviewed the role of Zometa in preventing adverse skeletal events and in treatment of bone metastases.  We again reviewed the potential side effects including flu like symptoms after infusion, fatigue, dizziness, headache, and osteonecrosis of the jaw.  She recently lost a crown.  She doesn't have a dentist, I will therefore place referral.  Plan to give first Zometa infusion at the same time as Zoladex injection.    3.  DVT:  Recommend continuing Xarelto until contraindicated given metastatic disease.  This medication was refilled today.    4.  Hot flashes/anxiety:  Continue Zoloft 50 mg PO daily and Seroquel 100 mg PO at bedtime.  Both medications were refilled today.    5.  Follow Up:  Labs, Zoladex injection and Zometa infusion within 1 week.  Labs again 2 weeks later.  Labs, ALEKSANDRA visit, and zoladex injection 4 weeks after initial Zoladex.  Dental consultation within 1-2 months.      Jahaira Plunkett MD

## 2021-01-12 NOTE — LETTER
1/12/2021         RE: Teresa Sanderson  [address] [place]  [facility] [address]        Dear Colleague,    Thank you for referring your patient, Teresa Sanderson, to the [facility]. Please see a copy of my visit note below.    Teresa is a 45 year old who is being evaluated via a billable video visit.      How would you like to obtain your AVS? MyChart  If the video visit is dropped, the invitation should be resent by: Text to cell phone: [phone]  Will anyone else be joining your video visit? No    Vitals - Patient Reported  Weight (Patient Reported): 106.1 kg (234 lb)  Height (Patient Reported): 182.9 cm (6')  BMI (Based on Pt Reported Ht/Wt): 31.74  Pain Score: Moderate Pain (5)  Pain Loc: (PATIENT REPORTS PAIN IN RIGHT LOWER BACK SHOOT DOWN LEFT LEG)      I have reviewed and updated patient's allergy and medication list.    Concerns: NEED REFILLS  Refills: OXYCODONE, RIVAROXABAN, SEROQUEL, DEXAMETHASONE, MORPHINE, PANTOPRAZOLE, METHOCARBAMOL, ORDANSETRON, SERTRALINE      Adina Chavez CMA    Video-Visit Details    Type of service:  Video Visit    Total time of video visit:  40 minutes, an additional 20 minutes was spent reviewing patients labs and imaging, coordinating patient care, and in documentation.    Originating Location (pt. Location): Home    Distant Location (provider location):  [facility]     Platform used for Video Visit: Planwise    Oncology Visit:   Date on this visit: 1/12/2021    Diagnosis:  ER positive left breast cancer metastasized to bones.     Primary Physician: No Ref-Primary, Physician     History Of Present Illness:    Ms. Sanderson is a 45 year old female with a h/o tobacco abuse and DVTs with left breast cancer metastasized to bone. She presented to [place] ED with back pain on 12/5/2020. MRI of the L-spine showed an abnormal L4 lesion with associated right paraspinal mass, abnormalities in L5 and the left  "iliac bone were also seen. CT C/A/P showed a left breast mass, lytic lesions of T7, L4, and the pelvis, and a 3 cm lesion in the kidney (thought to be a cyst). Ultrasound of the bilateral lower extremities showed a non-occlusive thrombus in the left popliteal vein. Mammogram and ultrasound of the bilateral breasts on 12/17/2020 showed a spiculated mass measuring at least 7.8 cm at 12-1:00 left breast extending from the nipple to 9 cm from the nipple with associated nipple retraction. This mass was biopsied, and showed IDC with surrounding DCIS, grade 3, ER+ 90%, and MO+ 75%.  HER2 was equivocal in approximately 35% of tumor cells by FISH and was negative by IHC.    Metastatic Breast Cancer Treatment:  12/23/2021 - 1/7/2021  Radiation (3000 cGy) to the lumbar spine.    Interval History:  Ms. Sanderson was seen via video visit today for metastatic breast cancer follow-up.  Since last visit she completed radiation to the lumbar spine.  She reports she has had some improvement in back pain with this treatment.  However, she reports ongoing significant pain in the area of the low back.  She has been taking MS Contin one 15 mg tablet twice a day.  She also has been taking oxycodone two 5 mg tablets once every 6 hours scheduled around-the-clock.  She states she has run out of oxycodone and morphine tablets and needs a refill of these.  She also needs a refill of a number of other medications.  Her pain continues to radiate down the left leg.  She states that it feels \"like a rolling knot\".  She denies numbness or weakness.  She has also had some cramping type symptoms in her right hand mainly in the thumb and her index finger.  She has had no fevers, chills, cough, shortness of breath, or chest pain.  She denies current abdominal complaints.  She denies increased swelling of her extremities.  She has ongoing anxiety which is chronic but perhaps slightly increased from prior.  The remainder of a complete 12 point review of " systems is reviewed with patient was negative with exception of that mentioned above.      Past Medical/Surgical History:   Past Medical History:   Diagnosis Date     Anxiety      Depression      DVT (deep venous thrombosis) (H) 2014     Left breast mass     x approximately 4-5 months     Pulmonary emboli (H)      Pyelonephritis      Schizoaffective disorder (H)      Tobacco use      Past Surgical History:   Procedure Laterality Date     TUBAL LIGATION  1998      No Known Allergies    Current Outpatient Medications   Medication     dexamethasone (DECADRON) 4 MG tablet     Lidocaine (LIDOCARE) 4 % Patch     methocarbamol (ROBAXIN) 500 MG tablet     morphine (MS CONTIN) 15 MG CR tablet     nicotine (NICODERM CQ) 14 MG/24HR 24 hr patch     ondansetron (ZOFRAN-ODT) 4 MG ODT tab     oxyCODONE (OXY-IR) 5 MG capsule     pantoprazole (PROTONIX) 40 MG EC tablet     polyethylene glycol (MIRALAX) 17 GM/Dose powder     QUEtiapine (SEROQUEL) 100 MG tablet     rivaroxaban ANTICOAGULANT (XARELTO) 15 MG TABS tablet     sertraline (ZOLOFT) 50 MG tablet     No current facility-administered medications for this visit.    '    Family and Social History:   Please see initial consultation dated 12/22/2020 for further details.    Physical Exam:   General: Adult female in NAD.  Alert and oriented.  Eyes: No erythema or discharge   Respiratory: Breathing comfortably on room air. No wheezing or distress.   Musculoskeletal: Full ROM of the bilateral upper extremities.   Skin: No visible concerning skin rashes or lesions   Neuro: No notable tremor and dyskinetic movements.   Psych: Mood and affect appear normal.     The rest of a comprehensive physical examination is deferred due to PHE (public health emergency) video visit restrictions.     Laboratory/Imaging Studies   12/17/2020 HER2:  Approximately 35% of tumor cells with average number of HER2 signals per nucleus of 5.0 and a HER2/VITO-17 ratio of 1.4.    HER2 IHC = 1+  (negative)    ASSESSMENT/PLAN:   45 year old female with history of DVT with left breast invasive ductal carcinoma, ER/NM positive, HER2 negative metastasized to bones.    1.  Metastatic breast cancer:  She has now completed palliative radiation to the lumbar spine.  I recommend starting treatment with Ibrance, zoladex, and anastrozole at this time.  We reviewed Ibrance is taken as a pill daily for three weeks on, followed by one week off.  The most common side effect of Ibrance is low blood counts and she will need labs every 2 weeks for the first 2 months to monitor blood counts.  Anastrozole is a pill taken daily continuously.  Zoladex is administered as a monthly injection.  We reviewed the side effects of ovarian suppression and anastrozole including but not limited to fatigue, hot flashes, muscle aches/joint pains, joint stiffness, worsening osteoporosis, mood instability/changes, and thinning of hair. She is agreeable to starting this treatment at this time.  We will schedule her for Zoladex within 1 week.  She should start Ibrance and anastrozole the same day.    2.  Bone metastases:  She is s/p XRT to the lumbar spine.  She reports ongoing pain and continues to take MS contin 15 mg PO BID and oxycodone prn.  We discussed pain will likely improve with initiation of breast cancer therapy and it will be important to wean her off of these medications.  I would like her to decrease oxycodone dosing to 10 mg PO q8 hours at this time.  Of note, she requires refills of both MS contin and oxycodone.  She was previously given a one month supply and therefore cannot refill them until next week.  She is okay with this and will supplement with tylenol and ibuprofen as needed in the meantime.  Per her insurance company, from here forward, can only receive a 7 day supply at a time.      Recommend weaning off of dexamethasone as well.  We discussed adverse effects of dexamethasone including moon facies, hyperglycemia, and  insomnia.  I have asked that she take 4 mg PO q8 hours x 1 week, followed by 4 mg BID x 1 week, followed by 4 mg daily x 1 week.    I also recommend starting Zometa IV once every 3 months at this time.  We reviewed the role of Zometa in preventing adverse skeletal events and in treatment of bone metastases.  We again reviewed the potential side effects including flu like symptoms after infusion, fatigue, dizziness, headache, and osteonecrosis of the jaw.  She recently lost a crown.  She doesn't have a dentist, I will therefore place referral.  Plan to give first Zometa infusion at the same time as Zoladex injection.    3.  DVT:  Recommend continuing Xarelto until contraindicated given metastatic disease.  This medication was refilled today.    4.  Hot flashes/anxiety:  Continue Zoloft 50 mg PO daily and Seroquel 100 mg PO at bedtime.  Both medications were refilled today.    5.  Follow Up:  Labs, Zoladex injection and Zometa infusion within 1 week.  Labs again 2 weeks later.  Labs, ALEKSANDRA visit, and zoladex injection 4 weeks after initial Zoladex.  Dental consultation within 1-2 months.      Again, thank you for allowing me to participate in the care of your patient.        Sincerely,        Jahaira Plunkett MD

## 2021-01-13 ENCOUNTER — PATIENT OUTREACH (OUTPATIENT)
Dept: ONCOLOGY | Facility: CLINIC | Age: 46
End: 2021-01-13

## 2021-01-13 RX ORDER — OXYCODONE HYDROCHLORIDE 5 MG/1
10 CAPSULE ORAL EVERY 8 HOURS PRN
Qty: 42 CAPSULE | Refills: 0 | Status: SHIPPED | OUTPATIENT
Start: 2021-01-18 | End: 2021-01-26

## 2021-01-13 RX ORDER — MORPHINE SULFATE 15 MG/1
15 TABLET, FILM COATED, EXTENDED RELEASE ORAL EVERY 12 HOURS
Qty: 14 TABLET | Refills: 0 | Status: SHIPPED | OUTPATIENT
Start: 2021-01-18 | End: 2021-01-26

## 2021-01-13 NOTE — PROGRESS NOTES
RN CARE COORDINATION NOTE      Outgoing: Called patient to update her that the pain medications are at [facility] pharmacy. Her insurance company is limiting her prescriptions to one week supplies.   She verbalized understanding.       Miranda Cunah MSN, RN, OCN  RN Care Coordinator  [facility]  [phone]

## 2021-01-14 ENCOUNTER — PATIENT OUTREACH (OUTPATIENT)
Dept: ONCOLOGY | Facility: CLINIC | Age: 46
End: 2021-01-14

## 2021-01-14 DIAGNOSIS — Z01.812 ENCOUNTER FOR PREPROCEDURE SCREENING LABORATORY TESTING FOR COVID-19: Primary | ICD-10-CM

## 2021-01-14 DIAGNOSIS — Z11.52 ENCOUNTER FOR PREPROCEDURE SCREENING LABORATORY TESTING FOR COVID-19: Primary | ICD-10-CM

## 2021-01-18 ENCOUNTER — INFUSION THERAPY VISIT (OUTPATIENT)
Dept: ONCOLOGY | Facility: CLINIC | Age: 46
End: 2021-01-18
Attending: INTERNAL MEDICINE
Payer: COMMERCIAL

## 2021-01-18 ENCOUNTER — APPOINTMENT (OUTPATIENT)
Dept: LAB | Facility: CLINIC | Age: 46
End: 2021-01-18
Attending: INTERNAL MEDICINE
Payer: COMMERCIAL

## 2021-01-18 ENCOUNTER — TELEPHONE (OUTPATIENT)
Dept: ONCOLOGY | Facility: CLINIC | Age: 46
End: 2021-01-18

## 2021-01-18 VITALS
RESPIRATION RATE: 18 BRPM | BODY MASS INDEX: 32.97 KG/M2 | WEIGHT: 243.1 LBS | TEMPERATURE: 98.6 F | SYSTOLIC BLOOD PRESSURE: 125 MMHG | HEART RATE: 73 BPM | DIASTOLIC BLOOD PRESSURE: 75 MMHG | OXYGEN SATURATION: 99 %

## 2021-01-18 DIAGNOSIS — Z17.0 MALIGNANT NEOPLASM OF OVERLAPPING SITES OF LEFT BREAST IN FEMALE, ESTROGEN RECEPTOR POSITIVE (H): ICD-10-CM

## 2021-01-18 DIAGNOSIS — C79.51 CARCINOMA OF LEFT BREAST METASTATIC TO BONE (H): Primary | ICD-10-CM

## 2021-01-18 DIAGNOSIS — C79.51 CARCINOMA OF LEFT BREAST METASTATIC TO BONE (H): ICD-10-CM

## 2021-01-18 DIAGNOSIS — C50.812 MALIGNANT NEOPLASM OF OVERLAPPING SITES OF LEFT BREAST IN FEMALE, ESTROGEN RECEPTOR POSITIVE (H): ICD-10-CM

## 2021-01-18 DIAGNOSIS — C50.912 CARCINOMA OF LEFT BREAST METASTATIC TO BONE (H): Primary | ICD-10-CM

## 2021-01-18 DIAGNOSIS — C50.912 CARCINOMA OF LEFT BREAST METASTATIC TO BONE (H): ICD-10-CM

## 2021-01-18 LAB
ALBUMIN SERPL-MCNC: 3.1 G/DL (ref 3.4–5)
ALP SERPL-CCNC: 129 U/L (ref 40–150)
ALT SERPL W P-5'-P-CCNC: 44 U/L (ref 0–50)
ANION GAP SERPL CALCULATED.3IONS-SCNC: 6 MMOL/L (ref 3–14)
AST SERPL W P-5'-P-CCNC: 15 U/L (ref 0–45)
BASOPHILS # BLD AUTO: 0 10E9/L (ref 0–0.2)
BASOPHILS NFR BLD AUTO: 0.2 %
BILIRUB SERPL-MCNC: 0.2 MG/DL (ref 0.2–1.3)
BUN SERPL-MCNC: 11 MG/DL (ref 7–30)
CALCIUM SERPL-MCNC: 9.2 MG/DL (ref 8.5–10.1)
CANCER AG27-29 SERPL-ACNC: 84 U/ML (ref 0–39)
CEA SERPL-MCNC: 2.6 UG/L (ref 0–2.5)
CHLORIDE SERPL-SCNC: 107 MMOL/L (ref 94–109)
CO2 SERPL-SCNC: 26 MMOL/L (ref 20–32)
CREAT SERPL-MCNC: 0.67 MG/DL (ref 0.52–1.04)
DIFFERENTIAL METHOD BLD: ABNORMAL
EOSINOPHIL # BLD AUTO: 0 10E9/L (ref 0–0.7)
EOSINOPHIL NFR BLD AUTO: 0.2 %
ERYTHROCYTE [DISTWIDTH] IN BLOOD BY AUTOMATED COUNT: 15.9 % (ref 10–15)
GFR SERPL CREATININE-BSD FRML MDRD: >90 ML/MIN/{1.73_M2}
GLUCOSE SERPL-MCNC: 115 MG/DL (ref 70–99)
HCT VFR BLD AUTO: 36.3 % (ref 35–47)
HGB BLD-MCNC: 11.7 G/DL (ref 11.7–15.7)
IMM GRANULOCYTES # BLD: 0.1 10E9/L (ref 0–0.4)
IMM GRANULOCYTES NFR BLD: 1.3 %
LYMPHOCYTES # BLD AUTO: 1 10E9/L (ref 0.8–5.3)
LYMPHOCYTES NFR BLD AUTO: 9.2 %
MCH RBC QN AUTO: 28.3 PG (ref 26.5–33)
MCHC RBC AUTO-ENTMCNC: 32.2 G/DL (ref 31.5–36.5)
MCV RBC AUTO: 88 FL (ref 78–100)
MONOCYTES # BLD AUTO: 0.7 10E9/L (ref 0–1.3)
MONOCYTES NFR BLD AUTO: 6.2 %
NEUTROPHILS # BLD AUTO: 8.8 10E9/L (ref 1.6–8.3)
NEUTROPHILS NFR BLD AUTO: 82.9 %
NRBC # BLD AUTO: 0 10*3/UL
NRBC BLD AUTO-RTO: 0 /100
PLATELET # BLD AUTO: 205 10E9/L (ref 150–450)
POTASSIUM SERPL-SCNC: 4 MMOL/L (ref 3.4–5.3)
PROT SERPL-MCNC: 7.3 G/DL (ref 6.8–8.8)
RBC # BLD AUTO: 4.14 10E12/L (ref 3.8–5.2)
SODIUM SERPL-SCNC: 139 MMOL/L (ref 133–144)
WBC # BLD AUTO: 10.7 10E9/L (ref 4–11)

## 2021-01-18 PROCEDURE — 96365 THER/PROPH/DIAG IV INF INIT: CPT

## 2021-01-18 PROCEDURE — 82378 CARCINOEMBRYONIC ANTIGEN: CPT | Performed by: INTERNAL MEDICINE

## 2021-01-18 PROCEDURE — 96402 CHEMO HORMON ANTINEOPL SQ/IM: CPT

## 2021-01-18 PROCEDURE — 250N000011 HC RX IP 250 OP 636: Performed by: INTERNAL MEDICINE

## 2021-01-18 PROCEDURE — 80053 COMPREHEN METABOLIC PANEL: CPT | Performed by: INTERNAL MEDICINE

## 2021-01-18 PROCEDURE — 85025 COMPLETE CBC W/AUTO DIFF WBC: CPT | Performed by: INTERNAL MEDICINE

## 2021-01-18 PROCEDURE — 86300 IMMUNOASSAY TUMOR CA 15-3: CPT | Performed by: INTERNAL MEDICINE

## 2021-01-18 RX ORDER — LORAZEPAM 0.5 MG/1
1 TABLET ORAL EVERY 4 HOURS PRN
Qty: 15 TABLET | Refills: 0 | Status: SHIPPED | OUTPATIENT
Start: 2021-01-18 | End: 2021-03-03

## 2021-01-18 RX ORDER — ZOLEDRONIC ACID 0.04 MG/ML
4 INJECTION, SOLUTION INTRAVENOUS ONCE
Status: COMPLETED | OUTPATIENT
Start: 2021-01-18 | End: 2021-01-18

## 2021-01-18 RX ORDER — PROCHLORPERAZINE MALEATE 10 MG
10 TABLET ORAL EVERY 6 HOURS PRN
Qty: 30 TABLET | Refills: 2 | Status: SHIPPED | OUTPATIENT
Start: 2021-01-18 | End: 2022-05-13

## 2021-01-18 RX ORDER — LORAZEPAM 0.5 MG/1
0.5 TABLET ORAL EVERY 4 HOURS PRN
Qty: 30 TABLET | Refills: 2 | Status: SHIPPED | OUTPATIENT
Start: 2021-01-18 | End: 2021-01-18

## 2021-01-18 RX ADMIN — GOSERELIN ACETATE 3.6 MG: 3.6 IMPLANT SUBCUTANEOUS at 15:06

## 2021-01-18 RX ADMIN — ZOLEDRONIC ACID 4 MG: 0.04 INJECTION, SOLUTION INTRAVENOUS at 14:54

## 2021-01-18 ASSESSMENT — PAIN SCALES - GENERAL: PAINLEVEL: SEVERE PAIN (6)

## 2021-01-18 NOTE — TELEPHONE ENCOUNTER
PA Initiation    Medication: LORAZEPAM - PA SUBMITTED - (Key: [id number])  Insurance Company: [employer] - Phone [phone] Fax [phone]  Start Date: 1/18/2021    [facility]   Oncology Pharmacy Liaison  [email or web address]   [phone] (phone)   [phone] (fax)

## 2021-01-18 NOTE — NURSING NOTE
Chief Complaint   Patient presents with     Blood Draw     labs drawn via PIV by RN in lab      /75   Pulse 73   Temp 98.6  F (37  C) (Tympanic)   Resp 18   Wt 110.3 kg (243 lb 1.6 oz)   SpO2 99%   BMI 32.97 kg/m      PIV placed to right wrist by RN in lab. Line flushed with normal saline. Pt tolerated well.  Checked in for next appointment.    Lorena Holt RN on 1/18/2021 at 2:15 PM

## 2021-01-18 NOTE — PROGRESS NOTES
Infusion Nursing Note:  Teresa Sanderson presents today for Zometa and Zoladex.    Patient seen by provider today: No    Note: Patient new to infusion, received first chemotherapy today.  Pt oriented to infusion room, location of bathrooms, nutrition stations, and call light. New patient teaching done previously.  All medications reviewed prior to administration and all patient's questions answered.  Patient instructed to call triage with chills and/or temperature greater than or equal to 100.5, uncontrolled nausea/vomiting, diarrhea, constipation, dizziness, shortness of breath, chest pain, bleeding, unexplained bruising, or any new/concerning symptoms, questions/concerns. Pt did not request or require any intervention for pain today.  Per written communication with Dr. Plunkett, ok to proceed with Zometa even though pt has not completed dentist appointment.      Intravenous Access:  Peripheral IV placed.    Treatment Conditions:  Lab Results   Component Value Date    HGB 11.7 01/18/2021     Lab Results   Component Value Date    WBC 10.7 01/18/2021      Lab Results   Component Value Date    ANEU 8.8 01/18/2021     Lab Results   Component Value Date     01/18/2021      Lab Results   Component Value Date     01/18/2021                   Lab Results   Component Value Date    POTASSIUM 4.0 01/18/2021           No results found for: MAG         Lab Results   Component Value Date    CR 0.67 01/18/2021                   Lab Results   Component Value Date    NANDO 9.2 01/18/2021                Lab Results   Component Value Date    BILITOTAL 0.2 01/18/2021           Lab Results   Component Value Date    ALBUMIN 3.1 01/18/2021                    Lab Results   Component Value Date    ALT 44 01/18/2021           Lab Results   Component Value Date    AST 15 01/18/2021     Post Infusion Assessment:  Patient tolerated infusion without incident.  Patient tolerated injection without incident to LLQ of abdomen with ice  prior.  Blood return noted pre and post infusion.  Site patent and intact, free from redness, edema or discomfort.  No evidence of extravasations.  Access discontinued per protocol.    Discharge Plan:   Prescription refills given for Compazine and Ativan.  Li, oral chemotherapist, will release and teach Ibrance and Arimidex.  Pt verbalized understanding of plan.  Per Dr. Plunkett, pt should start today.  Called oral chemotherapy a second time and Jonny relaying to Li that pt was in clinic.   Discharge instructions reviewed with: Patient.  Patient and/or family verbalized understanding of discharge instructions and all questions answered.  Copy of AVS reviewed with patient and/or family.  Patient will return 2/15/2021 for next appointment with PA.  Patient discharged in stable condition accompanied by: self.  Departure Mode: Ambulatory.  Face to Face time: 20 minutes.    Jenny Lin RN

## 2021-01-18 NOTE — PATIENT INSTRUCTIONS
Contact Numbers    [facility] Main Line/TRIAGE: [phone]    Call with chills and/or temperature greater than or equal to 100.5, uncontrolled nausea/vomiting, diarrhea, constipation, dizziness, shortness of breath, chest pain, bleeding, unexplained bruising, or any new/concerning symptoms, questions/concerns.     If you are having any concerning symptoms or wish to speak to a provider before your next infusion visit, please call your care coordinator or triage to notify them so we can adequately serve you.       After Hours: [phone]    If after hours, weekends, or holidays, call main hospital  and ask for Oncology doctor on call.         Lab Results:  Recent Results (from the past 12 hour(s))   CBC with platelets differential    Collection Time: 01/18/21  2:10 PM   Result Value Ref Range    WBC 10.7 4.0 - 11.0 10e9/L    RBC Count 4.14 3.8 - 5.2 10e12/L    Hemoglobin 11.7 11.7 - 15.7 g/dL    Hematocrit 36.3 35.0 - 47.0 %    MCV 88 78 - 100 fl    MCH 28.3 26.5 - 33.0 pg    MCHC 32.2 31.5 - 36.5 g/dL    RDW 15.9 (H) 10.0 - 15.0 %    Platelet Count 205 150 - 450 10e9/L    Diff Method Automated Method     % Neutrophils 82.9 %    % Lymphocytes 9.2 %    % Monocytes 6.2 %    % Eosinophils 0.2 %    % Basophils 0.2 %    % Immature Granulocytes 1.3 %    Nucleated RBCs 0 0 /100    Absolute Neutrophil 8.8 (H) 1.6 - 8.3 10e9/L    Absolute Lymphocytes 1.0 0.8 - 5.3 10e9/L    Absolute Monocytes 0.7 0.0 - 1.3 10e9/L    Absolute Eosinophils 0.0 0.0 - 0.7 10e9/L    Absolute Basophils 0.0 0.0 - 0.2 10e9/L    Abs Immature Granulocytes 0.1 0 - 0.4 10e9/L    Absolute Nucleated RBC 0.0    Comprehensive metabolic panel    Collection Time: 01/18/21  2:10 PM   Result Value Ref Range    Sodium 139 133 - 144 mmol/L    Potassium 4.0 3.4 - 5.3 mmol/L    Chloride 107 94 - 109 mmol/L    Carbon Dioxide 26 20 - 32 mmol/L    Anion Gap 6 3 - 14 mmol/L    Glucose 115 (H) 70 - 99 mg/dL    Urea Nitrogen 11 7 - 30 mg/dL    Creatinine 0.67 0.52 -  1.04 mg/dL    GFR Estimate >90 >60 mL/min/[1.73_m2]    GFR Estimate If Black >90 >60 mL/min/[1.73_m2]    Calcium 9.2 8.5 - 10.1 mg/dL    Bilirubin Total 0.2 0.2 - 1.3 mg/dL    Albumin 3.1 (L) 3.4 - 5.0 g/dL    Protein Total 7.3 6.8 - 8.8 g/dL    Alkaline Phosphatase 129 40 - 150 U/L    ALT 44 0 - 50 U/L    AST 15 0 - 45 U/L

## 2021-01-19 ENCOUNTER — TELEPHONE (OUTPATIENT)
Dept: ONCOLOGY | Facility: CLINIC | Age: 46
End: 2021-01-19

## 2021-01-19 NOTE — TELEPHONE ENCOUNTER
Prior Authorization Retail Medication Request    Medication/Dose:   Xarelto 15 mg      Insurance Name:  [facility]  Insurance ID:  [id number]

## 2021-01-19 NOTE — ORAL ONC MGMT
Oral Chemotherapy Monitoring Program     Placed call to patient in follow up of  therapy.     Left message to please call back in follow-up of therapy. No patient or drug names were mentioned.     Rex Cardona Pharm D.  Clinical Oncology Pharmacist- Oral Chemotherapy Monitoring Program  [phone]    January 19, 2021

## 2021-01-20 ENCOUNTER — TELEPHONE (OUTPATIENT)
Dept: ONCOLOGY | Facility: CLINIC | Age: 46
End: 2021-01-20

## 2021-01-20 DIAGNOSIS — Z79.899 ENCOUNTER FOR LONG-TERM (CURRENT) USE OF MEDICATIONS: ICD-10-CM

## 2021-01-20 DIAGNOSIS — C50.812 MALIGNANT NEOPLASM OF OVERLAPPING SITES OF LEFT BREAST IN FEMALE, ESTROGEN RECEPTOR POSITIVE (H): Primary | ICD-10-CM

## 2021-01-20 DIAGNOSIS — Z17.0 MALIGNANT NEOPLASM OF OVERLAPPING SITES OF LEFT BREAST IN FEMALE, ESTROGEN RECEPTOR POSITIVE (H): Primary | ICD-10-CM

## 2021-01-20 DIAGNOSIS — C50.912 CARCINOMA OF LEFT BREAST METASTATIC TO BONE (H): ICD-10-CM

## 2021-01-20 DIAGNOSIS — C79.51 CARCINOMA OF LEFT BREAST METASTATIC TO BONE (H): ICD-10-CM

## 2021-01-20 RX ORDER — ANASTROZOLE 1 MG/1
1 TABLET ORAL DAILY
Qty: 28 TABLET | Refills: 0 | Status: SHIPPED | OUTPATIENT
Start: 2021-01-20 | End: 2021-02-18

## 2021-01-20 NOTE — TELEPHONE ENCOUNTER
Central Prior Authorization Team   Phone: [phone]      PA Initiation    Medication: Xarelto 15 mg tablets-PA initiated  Insurance Company: [employer] - Phone [phone] Fax [phone]  Ph[id number] [place], [place] - [address]  Filling Pharmacy Phone: [phone]  Filling Pharmacy Fax:    Start Date: 1/20/2021

## 2021-01-20 NOTE — PROCEDURES
Radiotherapy Treatment Summary          Date of Report: 2021     PATIENT: RYANN SANDERSON  MEDICAL RECORD NO: [id number]  : 1975     DIAGNOSIS: C79.51 Secondary malignant neoplasm of bone  INTENT OF RADIOTHERAPY: Palliative, pain  PATHOLOGY: invasive ductal carcinoma of the left breast, grade 3, ER+/LA+/HER-2- by FISH and IHC                                  STAGE: IV  CONCURRENT SYSTEMIC THERAPY: none                    Details of the treatments summarized below are found in records kept in the Department of Radiation Oncology at [facility].     Treatment Summary:  Radiation Oncology - Course: 1 Protocol:   Treatment Site Current Dose Modality From To Elapsed Days Fx.  L3-L5  3,000 cGy 18 X 12/23/2020  2021  15 10          Dose per Fraction:  300 cGy      Total Dose:   3000 cGy (10 fractions)           COMMENTS:                      Ms. Sanderson is a 45 year old woman with newly diagnosed metastatic breast cancer as above. She presented to the ED   20 with a breast mass and was found to have lytic lesions of T7 and L4 on CT chest. MR lumbar spine showed bone   involvement of L4 with right paraspinal mass (without epidural involvement). Radiation Oncology was consulted,   and recommended establishing a diagnosis prior to initiation of treatment. The patient then proceeded with US left breast   biopsy on 20, and pathology showed invasive ductal carcinoma, [place] grade 3, ER+ 90%, LA+ 75%, HER2   negative by IHC/FISH. During this time, she reported sharp low back pain associated with mild sensory loss of lower   extremities. She was thus referred for palliative radiotherapy.     Ms. Sanderson was treated with 3000 cGy in 10 fractions to the L3-L5 region of the lumbar spine using 3D conformal   radiotherapy. She tolerated treatment well with improvement in her pain, which was controlled with oxycodone and   morphine.      ED visits/hospitalizations: none     Missed treatments:  12/25/2020 and 1/1/2021 clinic closures for holidays, made up at the end of treatment.     Acute Toxicity Profile by CTC v5.0: none     PAIN MANAGEMENT: pain well-controlled with MS contin 15 mg BID and oxycodone 5-10 mg every 6   hours as needed for severe breakthrough pain. She was also taking dexamethasone 4 mg per medical oncology                      FOLLOW UP PLAN:                         1. Follow up with DANIEL Castillo on 2/8/2021 as scheduled  2. Follow up with Dr. Plunkett in medical oncology on 1/12/2021 as scheduled     Resident Physician:    Krishna Duron M.D.   Staff Physician: Tesha Kebede M.D.  Physicist: Zack Cai MS     CC: MD Perry Jason MD                                  Radiation Oncology:  [facility] [address] [place] [address]

## 2021-01-20 NOTE — ORAL ONC MGMT
Oral Chemotherapy Monitoring Program     Placed call to patient in follow up of  therapy.     Left message to please call back in follow-up of therapy. I did release the letrozole and ibrance so that they can be delivered. No patient or drug names were mentioned.     Rex Cardona Pharm D.  Clinical Oncology Pharmacist- Oral Chemotherapy Monitoring Program  [phone]    January 20, 2021

## 2021-01-20 NOTE — TELEPHONE ENCOUNTER
Received fax from [facility] that Rx for lorazepam sent 1/18 was unable to be filled as pt's insurance is no longer contracted with their pharmacy.    Called pt to confirm if she picked up her lorazepam, she stated this was transferred to [facility] so she does have it, she updated pharmacy on file to [facility]. She asked about oral chemo medication, stated she had not received this yet. Chart shows Ibrance was approved on 1/12 through [facility], routing to care team for follow-up.

## 2021-01-20 NOTE — ORAL ONC MGMT
Oral Chemotherapy Monitoring Program    Lab Monitoring Plan  CBC every 2 weeks x 4 then monthly, CMP monthly  Subjective/Objective:  Teresa Sanderson is a 45 year old female contacted by phone for an initial visit for oral chemotherapy education.    ORAL CHEMOTHERAPY 1/11/2021 1/14/2021 1/19/2021 1/20/2021 1/20/2021   Assessment Type Other Chart Review New Teach New Teach New Teach;Incoming phone call   Diagnosis Code Breast Cancer Breast Cancer Breast Cancer Breast Cancer Breast Cancer   Providers Dr. Dimitrios Plunkett   [facility]   Drug Name Ibrance (Palbociclib) Ibrance (Palbociclib) Ibrance (Palbociclib) Ibrance (Palbociclib) Ibrance (Palbociclib)   Dose - - - - 125 mg   Current Schedule - - - - Daily   Cycle Details - - - - 3 weeks on, 1 week off   Any new drug interactions? - - - - Yes   Pharmacist Intervention? - - - - Yes   Intervention(s) - - - - Patient Education   Is the dose as ordered appropriate for the patient? - - - - Yes     Vitals:  BP:   BP Readings from Last 1 Encounters:   01/18/21 125/75     Wt Readings from Last 1 Encounters:   01/18/21 110.3 kg (243 lb 1.6 oz)     Estimated body surface area is 2.37 meters squared as calculated from the following:    Height as of 12/6/20: 1.829 m (6').    Weight as of 1/18/21: 110.3 kg (243 lb 1.6 oz).    Labs:  _  Result Component Current Result Ref Range   Sodium 139 (1/18/2021) 133 - 144 mmol/L     _  Result Component Current Result Ref Range   Potassium 4.0 (1/18/2021) 3.4 - 5.3 mmol/L     _  Result Component Current Result Ref Range   Calcium 9.2 (1/18/2021) 8.5 - 10.1 mg/dL     No results found for Mag within last 30 days.     No results found for Phos within last 30 days.     _  Result Component Current Result Ref Range   Albumin 3.1 (L) (1/18/2021) 3.4 - 5.0 g/dL     _  Result Component Current Result Ref Range   Urea Nitrogen 11 (1/18/2021) 7 - 30 mg/dL      _  Result Component Current Result Ref Range   Creatinine 0.67 (1/18/2021) 0.52 - 1.04 mg/dL     _  Result Component Current Result Ref Range   AST 15 (1/18/2021) 0 - 45 U/L     _  Result Component Current Result Ref Range   ALT 44 (1/18/2021) 0 - 50 U/L     _  Result Component Current Result Ref Range   Bilirubin Total 0.2 (1/18/2021) 0.2 - 1.3 mg/dL     _  Result Component Current Result Ref Range   WBC 10.7 (1/18/2021) 4.0 - 11.0 10e9/L     _  Result Component Current Result Ref Range   Hemoglobin 11.7 (1/18/2021) 11.7 - 15.7 g/dL     _  Result Component Current Result Ref Range   Platelet Count 205 (1/18/2021) 150 - 450 10e9/L     _  Result Component Current Result Ref Range   Absolute Neutrophil 8.8 (H) (1/18/2021) 1.6 - 8.3 10e9/L       Assessment:  Patient is appropriate to start therapy.    Plan:  Basic chemotherapy teaching was reviewed with the patient including indication, start date of therapy, dose, administration, adverse effects, missed doses, food and drug interactions, monitoring, side effect management, office contact information, and safe handling. Written materials were provided and all questions answered to Teresa's stated satisfaction.    Follow-Up:  About one week after start of Ibrance.     Jonny Vang PharmD  [facility]  [phone]  January 20, 2021

## 2021-01-21 NOTE — TELEPHONE ENCOUNTER
Prior Authorization Not Needed per Insurance-This PA was withdrawn. Per Albert at [facility], normal dosing is 15 mg bid for 21 days, then increased to 20 mg. The claim will pay for 42 tablets, but will not pay for 60 tablets. I will route back to prescriber for clarification of whether she will be changing to 20 mg after the 21 days, or for rationale why she would remain on 15 mg after that time.     Medication: Xarelto 15 mg tablets-PA Not Needed  Insurance Company: [employer] - Phone [phone] Fax [phone]  Expected CoPay:      [id number] [place], [place] - [address] [place] [address]  Pharmacy Notified: No  Patient Notified: No

## 2021-01-22 DIAGNOSIS — I82.432 ACUTE DEEP VEIN THROMBOSIS (DVT) OF POPLITEAL VEIN OF LEFT LOWER EXTREMITY (H): Primary | ICD-10-CM

## 2021-01-26 ENCOUNTER — TELEPHONE (OUTPATIENT)
Dept: ONCOLOGY | Facility: CLINIC | Age: 46
End: 2021-01-26

## 2021-01-26 DIAGNOSIS — N92.0 EXCESSIVE OR FREQUENT MENSTRUATION: ICD-10-CM

## 2021-01-26 DIAGNOSIS — Z17.0 MALIGNANT NEOPLASM OF OVERLAPPING SITES OF LEFT BREAST IN FEMALE, ESTROGEN RECEPTOR POSITIVE (H): Primary | ICD-10-CM

## 2021-01-26 DIAGNOSIS — C50.812 MALIGNANT NEOPLASM OF OVERLAPPING SITES OF LEFT BREAST IN FEMALE, ESTROGEN RECEPTOR POSITIVE (H): Primary | ICD-10-CM

## 2021-01-26 DIAGNOSIS — C79.51 SPINE METASTASIS: ICD-10-CM

## 2021-01-26 RX ORDER — MORPHINE SULFATE 15 MG/1
15 TABLET, FILM COATED, EXTENDED RELEASE ORAL EVERY 12 HOURS
Qty: 14 TABLET | Refills: 0 | Status: SHIPPED | OUTPATIENT
Start: 2021-01-26 | End: 2021-02-15

## 2021-01-26 RX ORDER — OXYCODONE HYDROCHLORIDE 5 MG/1
10 CAPSULE ORAL EVERY 8 HOURS PRN
Qty: 42 CAPSULE | Refills: 0 | Status: SHIPPED | OUTPATIENT
Start: 2021-01-26 | End: 2021-02-15

## 2021-01-26 NOTE — TELEPHONE ENCOUNTER
Per Dr. Plunkett: Zoladex does not cause prolong bleeding, pt may not have any more periods after starting Zoladex. Since she is on Xarelto, this may be prolonging bleeding but since flow is still heavy pt should follow-up with Gynecology. Monitor for other bleeding or bruising on Xarelto, now should be once daily dose.     Called pt and reached , lmcb for above message.

## 2021-01-26 NOTE — TELEPHONE ENCOUNTER
"Pt called back and stated she got a call from the pharmacy stating a \"man\" called her and told her the controlled substance could not be filled, it had to be transferred to [facility] or [facility]. She does not have his name or number but thought he called from [facility] pharmacy since she was expecting to  her meds this afternoon.    Transferred call to pharmacy and Micheline pharmacist confirmed there were no issues with refills, all 3 are ready for . No documentation on their end that pt was contacted today. Informed pt to check her phone and see where the call came from, to make sure it wasn't the [facility] pharmacy about her Ibrance, she stated she would.    Also relayed recommendations from ROXY Plunkett to pt, she stated she does not have a Gyn and would like a referral, [facility], informed pt it may take a while to get set up with someone, if symptoms get worse she should call back, she voiced understanding.  "

## 2021-01-26 NOTE — TELEPHONE ENCOUNTER
Pt called in to triage stating she was unable to  her MS Contin 1/18 because of insurance, asking if [facility] pharmacy still has it on file and if she needs to come to the 2nd floor for pickup. Also stated she was contacted by oral chemo pharmacist that Ibrance will arrive today but she though Dr. Plunkett sent more than one medication to the pharmacy.    Went through med list with pt and she confirmed all meds prescribed on 1/12 to [facility] has been transferred to [facility] as her insurance no longer goes through [facility]. Xarelto prescribed on 1/22 was sent to [facility] again, and Anastrazole on 1/20 was sent to [facility] pharmacy. She stated since she is coming to  her MS Contin, she would like Anastrozole transferred here, writer called and spoke with See, pharmacist and they will have all 3 meds ready by 3:30 pm.    Pt also reporting she is on her 12th day of menstrual cycle. Usually this only lasts 3-5 days, some days it feels like it is decreasing but then it increases again, she is  changing pads 3-4 times a day. She wonders if this is due to the injections she received on 1/18: Zoladex and Zometa. Informed her will have to page Dr. Plunkett to inquire. She also report increased back pain and rib pain, shoulders, under arms, taking Oxy 10 mg every 8 hours. Because she has been out of her MS Contin since last Tuesday, informed her if she does not have relief on both MS Contin and Oxy she should call back, she verbalized understanding. Paged Dr. Plunkett.

## 2021-01-28 ENCOUNTER — TELEPHONE (OUTPATIENT)
Dept: ONCOLOGY | Facility: CLINIC | Age: 46
End: 2021-01-28

## 2021-01-28 NOTE — TELEPHONE ENCOUNTER
Oral Chemotherapy Monitoring Program     Received a call from [facility] regarding a question on the Ibrance prescription e-prescribed. Wanting to confirm patient will be on an aromatase inhibitor. Clarified that this was sent locally to the pharmacy. They were appreciative of the information and indicated that they would get working on filling the script.       Li Paige, PharmD  Oral Chemotherapy Monitoring Program  [facility]  [phone]  January 28, 2021

## 2021-02-03 ENCOUNTER — OFFICE VISIT (OUTPATIENT)
Dept: OBGYN | Facility: CLINIC | Age: 46
End: 2021-02-03
Attending: ADVANCED PRACTICE MIDWIFE
Payer: COMMERCIAL

## 2021-02-03 ENCOUNTER — ANCILLARY PROCEDURE (OUTPATIENT)
Dept: ULTRASOUND IMAGING | Facility: CLINIC | Age: 46
End: 2021-02-03
Attending: OBSTETRICS & GYNECOLOGY
Payer: COMMERCIAL

## 2021-02-03 ENCOUNTER — TELEPHONE (OUTPATIENT)
Dept: OBGYN | Facility: CLINIC | Age: 46
End: 2021-02-03

## 2021-02-03 VITALS
BODY MASS INDEX: 33.46 KG/M2 | DIASTOLIC BLOOD PRESSURE: 82 MMHG | HEART RATE: 80 BPM | SYSTOLIC BLOOD PRESSURE: 138 MMHG | WEIGHT: 246.7 LBS

## 2021-02-03 DIAGNOSIS — N92.1 MENORRHAGIA WITH IRREGULAR CYCLE: Primary | ICD-10-CM

## 2021-02-03 DIAGNOSIS — N93.9 VAGINAL BLEEDING: Primary | ICD-10-CM

## 2021-02-03 PROCEDURE — 88305 TISSUE EXAM BY PATHOLOGIST: CPT | Mod: 26 | Performed by: PATHOLOGY

## 2021-02-03 PROCEDURE — 58100 BIOPSY OF UTERUS LINING: CPT | Performed by: OBSTETRICS & GYNECOLOGY

## 2021-02-03 PROCEDURE — G0124 SCREEN C/V THIN LAYER BY MD: HCPCS | Mod: 26 | Performed by: PATHOLOGY

## 2021-02-03 PROCEDURE — 88305 TISSUE EXAM BY PATHOLOGIST: CPT | Mod: TC | Performed by: OBSTETRICS & GYNECOLOGY

## 2021-02-03 PROCEDURE — G0463 HOSPITAL OUTPT CLINIC VISIT: HCPCS

## 2021-02-03 PROCEDURE — G0145 SCR C/V CYTO,THINLAYER,RESCR: HCPCS | Performed by: OBSTETRICS & GYNECOLOGY

## 2021-02-03 PROCEDURE — 87624 HPV HI-RISK TYP POOLED RSLT: CPT | Performed by: OBSTETRICS & GYNECOLOGY

## 2021-02-03 PROCEDURE — 99204 OFFICE O/P NEW MOD 45 MIN: CPT | Mod: 25 | Performed by: OBSTETRICS & GYNECOLOGY

## 2021-02-03 PROCEDURE — 76830 TRANSVAGINAL US NON-OB: CPT | Mod: 26 | Performed by: OBSTETRICS & GYNECOLOGY

## 2021-02-03 PROCEDURE — 76830 TRANSVAGINAL US NON-OB: CPT

## 2021-02-03 NOTE — LETTER
2/3/2021       RE: Teresa Sanderson  [address] [place]  [place] [address]     Dear Colleague,    Thank you for referring your patient, Teresa Sanderson, to the [facility] [place] at [facility]. Please see a copy of my visit note below.    CC:  Consult from Oncology clinic for vaginal bleeding x 18 days.   HPI: 44 yo  with metastatic breast cancer.  She is here for evaluation of vaginal bleeding that began after starting her recent chemotherapy regimen.  She noted bleeding for 18 days in a row, despite usually having normal cycles. No real pain with this.      Is using Zoladex, Xaralto, Ibrance, Anastrazole    Patients records are available and reviewed at today's visit.    Past GYN history:  No h/o abnormal pap, but has not had care in some time.   Last PAP smear:  uncertain    Past Medical History:   Diagnosis Date     Anxiety      Depression      DVT (deep venous thrombosis) (H) 2014     Left breast mass     x approximately 4-5 months     Pulmonary emboli (H)      Pyelonephritis      Schizoaffective disorder (H)      Tobacco use        Past Surgical History:   Procedure Laterality Date     TUBAL LIGATION  1998       Family History   Problem Relation Age of Onset     Lung Cancer Mother      Lung Cancer Father      Lupus Brother      Hypertension Other      Diabetes Other        Allergies: Patient has no known allergies.    Current Outpatient Medications   Medication Sig Dispense Refill     anastrozole (ARIMIDEX) 1 MG tablet Take 1 tablet (1 mg) by mouth daily for 28 days 28 tablet 0     Lidocaine (LIDOCARE) 4 % Patch Place 1-2 patches onto the skin every 24 hours To prevent lidocaine toxicity, patient should be patch free for 12 hrs daily. 30 patch 1     LORazepam (ATIVAN) 0.5 MG tablet Take 2 tablets (1 mg) by mouth every 4 hours as needed (Anxiety, Nausea/Vomiting or Sleep) 15 tablet 0     methocarbamol (ROBAXIN) 500 MG  tablet Take 1 tablet (500 mg) by mouth 4 times daily as needed for muscle spasms 60 tablet 0     morphine (MS CONTIN) 15 MG CR tablet Take 1 tablet (15 mg) by mouth every 12 hours 14 tablet 0     nicotine (NICODERM CQ) 14 MG/24HR 24 hr patch Place 1 patch onto the skin daily 30 patch 1     ondansetron (ZOFRAN-ODT) 4 MG ODT tab Take 1 tablet (4 mg) by mouth every 6 hours as needed for nausea or vomiting 120 tablet 0     oxyCODONE (OXY-IR) 5 MG capsule Take 2 capsules (10 mg) by mouth every 8 hours as needed for severe pain 42 capsule 0     palbociclib (IBRANCE) 125 MG tablet Take 1 tablet (125 mg) by mouth daily Take for 21 days, then 7 days off. 21 tablet 0     pantoprazole (PROTONIX) 40 MG EC tablet Take 1 tablet (40 mg) by mouth every morning (before breakfast) 30 tablet 1     polyethylene glycol (MIRALAX) 17 GM/Dose powder Take 17 g by mouth daily 510 g 1     prochlorperazine (COMPAZINE) 10 MG tablet Take 1 tablet (10 mg) by mouth every 6 hours as needed (Nausea/Vomiting) 30 tablet 2     QUEtiapine (SEROQUEL) 100 MG tablet Take 1 tablet (100 mg) by mouth At Bedtime 30 tablet 1     rivaroxaban ANTICOAGULANT (XARELTO) 20 MG TABS tablet Take 1 tablet (20 mg) by mouth daily (with dinner) 30 tablet 11     sertraline (ZOLOFT) 50 MG tablet Take 1 tablet (50 mg) by mouth daily 30 tablet 1     dexamethasone (DECADRON) 4 MG tablet Take 1 tablet (4 mg) by mouth every 8 hours for 7 days, THEN 1 tablet (4 mg) 2 times daily (with meals) for 7 days, THEN 1 tablet (4 mg) daily (with breakfast) for 7 days. 42 tablet 0       ROS:  C: NEGATIVE for fever, chills, change in weight  I: NEGATIVE for worrisome rashes, moles or lesions  E: NEGATIVE for vision changes or irritation  E/M: NEGATIVE for ear, mouth and throat problems  R: NEGATIVE for significant cough or SOB  CV: NEGATIVE for chest pain, palpitations or peripheral edema  GI: NEGATIVE for nausea, abdominal pain, heartburn, or change in bowel habits  : NEGATIVE for  "frequency, dysuria, hematuria, vaginal discharge  M: NEGATIVE for significant arthralgias or myalgia  N: NEGATIVE for weakness, dizziness or paresthesias  E: NEGATIVE for temperature intolerance, skin/hair changes  P: NEGATIVE for changes in mood or affect    EXAM:  Blood pressure 138/82, pulse 80, weight 111.9 kg (246 lb 11.2 oz), not currently breastfeeding.   BMI= Body mass index is 33.46 kg/m .  General - pleasant female in no acute distress.  Neck - supple without lymphadenopathy or thyromegaly.  Lungs - clear to auscultation bilaterally.  Heart - regular rate and rhythm without murmur.  Breast - no nodularity, asymmetry or nipple discharge bilaterally.  Abdomen - soft, nontender, nondistended, no hepatosplenomegaly.  Pelvic - EG: normal adult female, BUS: within normal limits, Vagina: well rugated, no discharge, Cervix: no lesions or CMT, Uterus: firm, normal sized and nontender, Adnexae: no masses or tenderness. Pap collected.   Rectovaginal - deferred.  Musculoskeletal - no gross deformities.  Neurological - normal strength, sensation, and mental status.    Endometrial Biopsy                                                                       Time Out - \"Pause for the Cause\"  Just before the procedure begins, through verbal and active participation of team members, verify:    Initials   Patient Name    smd   Patient Date of Birth smd   Procedure to be performed  smd   Site, laterality, level or multiples, noting patient position   smd   Relevant images or diagnostics available/displayed   smd   Implants, special equipment or special requirements        smd     Consent:  Verbal consent obtained from patient.  and Written consent signed and scanned into medical record.    Indication: prolonged bleeding with continued thick lining    Pregnancy test:  S/p Tubal ligation    Using a medium Graves speculum, the cervix was visualized. The cervix was prepped with Betadine.  A single tooth tenaculum was applied to " the anterior lip of the cervix. The endometrial pipelle was advanced through the cervix without difficulty and a sample collected. One additional pass was made.  The tenaculum was removed from the cervix and the tenaculum site made hemostatic with pressure.    EBL: 2cc  Complications:  none  Pathology: EMB sample was sent to pathology.  Tolerance of Procedure:  Patient did tolerate the procedure well.     Patient was instructed to call if she experiences any heavy bleeding, severe cramping, or abnormal vaginal discharge.  May take ibuprofen 400-800 mg PO TID PRN or naproxen 500 mg PO BID for cramping.    Will notify patient of results.    ASSESSMENT/PLAN:  Vaginal bleeding, menorrhagia in setting of metastatic breast cancer, chemotherapy and anticoagulant tx.      (N92.1) Menorrhagia with irregular cycle  (primary encounter diagnosis)  Comment:   Plan: ENDOMETRIAL BIOPSY w/o CERVICAL DILATION,         Surgical pathology exam, Obtaining, preparing         and conveyance of cervical or vaginal smear to         laboratory., Pap imaged thin layer screen with         HPV - recommended age 30 - 65 years (select HPV        order below), HPV High Risk Types DNA Cervical    We discussed there may be multifactorial cause for bleeding.  Given the recent start of chemotherapy, anastrazole can certainly lead to vaginal bleeding. The Xaralto can cause increased bleeding once it begins.  Given metastatic breast cancer, evaluation for other GYN related cancer is reasonable, though less likely. EMB done today.     Pt is currently not bleeding.  Advised if heavy bleeding, consider progesterone only therapy with Prometrium 5-10 mg/day if ok with ONCOLOGY team, though with ER NC +/+ cancer, I recognize this is not ideal.     Letter will be sent to the referring provider.    Manju Chaudhari MD, FACOG  (she/her/hers)    Department of Ob/Gyn/Women's Health  [employer]  [facility]  [address] [place] [address]  [email or web address]  p. [phone]  f. [phone]

## 2021-02-03 NOTE — LETTER
2/3/2021      RE: Teresa Sanderson  [address] [place]  [facility] [address]       CC:  Consult from Oncology clinic for vaginal bleeding x 18 days.   HPI: 44 yo  with metastatic breast cancer.  She is here for evaluation of vaginal bleeding that began after starting her recent chemotherapy regimen.  She noted bleeding for 18 days in a row, despite usually having normal cycles. No real pain with this.      Is using Zoladex, Xaralto, Ibrance, Anastrazole    Patients records are available and reviewed at today's visit.    Past GYN history:  No h/o abnormal pap, but has not had care in some time.   Last PAP smear:  uncertain    Past Medical History:   Diagnosis Date     Anxiety      Depression      DVT (deep venous thrombosis) (H) 2014     Left breast mass     x approximately 4-5 months     Pulmonary emboli (H)      Pyelonephritis      Schizoaffective disorder (H)      Tobacco use        Past Surgical History:   Procedure Laterality Date     TUBAL LIGATION  1998       Family History   Problem Relation Age of Onset     Lung Cancer Mother      Lung Cancer Father      Lupus Brother      Hypertension Other      Diabetes Other        Allergies: Patient has no known allergies.    Current Outpatient Medications   Medication Sig Dispense Refill     anastrozole (ARIMIDEX) 1 MG tablet Take 1 tablet (1 mg) by mouth daily for 28 days 28 tablet 0     Lidocaine (LIDOCARE) 4 % Patch Place 1-2 patches onto the skin every 24 hours To prevent lidocaine toxicity, patient should be patch free for 12 hrs daily. 30 patch 1     LORazepam (ATIVAN) 0.5 MG tablet Take 2 tablets (1 mg) by mouth every 4 hours as needed (Anxiety, Nausea/Vomiting or Sleep) 15 tablet 0     methocarbamol (ROBAXIN) 500 MG tablet Take 1 tablet (500 mg) by mouth 4 times daily as needed for muscle spasms 60 tablet 0     morphine (MS CONTIN) 15 MG CR tablet Take 1 tablet (15 mg) by mouth every 12 hours 14 tablet 0     nicotine (NICODERM CQ) 14 MG/24HR 24 hr  patch Place 1 patch onto the skin daily 30 patch 1     ondansetron (ZOFRAN-ODT) 4 MG ODT tab Take 1 tablet (4 mg) by mouth every 6 hours as needed for nausea or vomiting 120 tablet 0     oxyCODONE (OXY-IR) 5 MG capsule Take 2 capsules (10 mg) by mouth every 8 hours as needed for severe pain 42 capsule 0     palbociclib (IBRANCE) 125 MG tablet Take 1 tablet (125 mg) by mouth daily Take for 21 days, then 7 days off. 21 tablet 0     pantoprazole (PROTONIX) 40 MG EC tablet Take 1 tablet (40 mg) by mouth every morning (before breakfast) 30 tablet 1     polyethylene glycol (MIRALAX) 17 GM/Dose powder Take 17 g by mouth daily 510 g 1     prochlorperazine (COMPAZINE) 10 MG tablet Take 1 tablet (10 mg) by mouth every 6 hours as needed (Nausea/Vomiting) 30 tablet 2     QUEtiapine (SEROQUEL) 100 MG tablet Take 1 tablet (100 mg) by mouth At Bedtime 30 tablet 1     rivaroxaban ANTICOAGULANT (XARELTO) 20 MG TABS tablet Take 1 tablet (20 mg) by mouth daily (with dinner) 30 tablet 11     sertraline (ZOLOFT) 50 MG tablet Take 1 tablet (50 mg) by mouth daily 30 tablet 1     dexamethasone (DECADRON) 4 MG tablet Take 1 tablet (4 mg) by mouth every 8 hours for 7 days, THEN 1 tablet (4 mg) 2 times daily (with meals) for 7 days, THEN 1 tablet (4 mg) daily (with breakfast) for 7 days. 42 tablet 0       ROS:  C: NEGATIVE for fever, chills, change in weight  I: NEGATIVE for worrisome rashes, moles or lesions  E: NEGATIVE for vision changes or irritation  E/M: NEGATIVE for ear, mouth and throat problems  R: NEGATIVE for significant cough or SOB  CV: NEGATIVE for chest pain, palpitations or peripheral edema  GI: NEGATIVE for nausea, abdominal pain, heartburn, or change in bowel habits  : NEGATIVE for frequency, dysuria, hematuria, vaginal discharge  M: NEGATIVE for significant arthralgias or myalgia  N: NEGATIVE for weakness, dizziness or paresthesias  E: NEGATIVE for temperature intolerance, skin/hair changes  P: NEGATIVE for changes in  "mood or affect    EXAM:  Blood pressure 138/82, pulse 80, weight 111.9 kg (246 lb 11.2 oz), not currently breastfeeding.   BMI= Body mass index is 33.46 kg/m .  General - pleasant female in no acute distress.  Neck - supple without lymphadenopathy or thyromegaly.  Lungs - clear to auscultation bilaterally.  Heart - regular rate and rhythm without murmur.  Breast - no nodularity, asymmetry or nipple discharge bilaterally.  Abdomen - soft, nontender, nondistended, no hepatosplenomegaly.  Pelvic - EG: normal adult female, BUS: within normal limits, Vagina: well rugated, no discharge, Cervix: no lesions or CMT, Uterus: firm, normal sized and nontender, Adnexae: no masses or tenderness. Pap collected.   Rectovaginal - deferred.  Musculoskeletal - no gross deformities.  Neurological - normal strength, sensation, and mental status.    Endometrial Biopsy                                                                       Time Out - \"Pause for the Cause\"  Just before the procedure begins, through verbal and active participation of team members, verify:    Initials   Patient Name    smd   Patient Date of Birth smd   Procedure to be performed  smd   Site, laterality, level or multiples, noting patient position   smd   Relevant images or diagnostics available/displayed   smd   Implants, special equipment or special requirements        smd     Consent:  Verbal consent obtained from patient.  and Written consent signed and scanned into medical record.    Indication: prolonged bleeding with continued thick lining    Pregnancy test:  S/p Tubal ligation    Using a medium Graves speculum, the cervix was visualized. The cervix was prepped with Betadine.  A single tooth tenaculum was applied to the anterior lip of the cervix. The endometrial pipelle was advanced through the cervix without difficulty and a sample collected. One additional pass was made.  The tenaculum was removed from the cervix and the tenaculum site made hemostatic " with pressure.    EBL: 2cc  Complications:  none  Pathology: EMB sample was sent to pathology.  Tolerance of Procedure:  Patient did tolerate the procedure well.     Patient was instructed to call if she experiences any heavy bleeding, severe cramping, or abnormal vaginal discharge.  May take ibuprofen 400-800 mg PO TID PRN or naproxen 500 mg PO BID for cramping.    Will notify patient of results.    ASSESSMENT/PLAN:  Vaginal bleeding, menorrhagia in setting of metastatic breast cancer, chemotherapy and anticoagulant tx.      (N92.1) Menorrhagia with irregular cycle  (primary encounter diagnosis)  Comment:   Plan: ENDOMETRIAL BIOPSY w/o CERVICAL DILATION,         Surgical pathology exam, Obtaining, preparing         and conveyance of cervical or vaginal smear to         laboratory., Pap imaged thin layer screen with         HPV - recommended age 30 - 65 years (select HPV        order below), HPV High Risk Types DNA Cervical    We discussed there may be multifactorial cause for bleeding.  Given the recent start of chemotherapy, anastrazole can certainly lead to vaginal bleeding. The Xaralto can cause increased bleeding once it begins.  Given metastatic breast cancer, evaluation for other GYN related cancer is reasonable, though less likely. EMB done today.     Pt is currently not bleeding.  Advised if heavy bleeding, consider progesterone only therapy with Prometrium 5-10 mg/day if ok with ONCOLOGY team, though with ER [place] +/+ cancer, I recognize this is not ideal.     Letter will be sent to the referring provider.    Manju Chaudhari MD, FACOG  (she/her/hers)    Department of Ob/Gyn/Women's Health  [employer]  [facility]  [address] [place] [address]  [email or web address]  p. [phone]  f. [phone]            Manju Chaudhari MD

## 2021-02-03 NOTE — LETTER
"February 5, 2021      Ryann Mann  [address] [facility] [address]        Dear ,    We are writing to inform you of your test results.    Your biopsy of the uterus was normal.     Resulted Orders   Surgical pathology exam   Result Value Ref Range    Copath Report       Patient Name: RYANN MANN  MR#: [id number]  Specimen #: [id number]  Collected: 2/3/2021  Received: 2/4/2021  Reported: 2/5/2021 13:32  Ordering Phy(s): VIRGIL TAPIA    For improved result formatting, select 'View Enhanced Report Format' under   Linked Documents section.    SPECIMEN(S):  Endometrial biopsy    FINAL DIAGNOSIS:  ENDOMETRIUM, BIOPSY:  - Weakly proliferative endometrium with gland and stoma breakdown  - Chronic non specific endometritis  - Negative for hyperplasia or atypia    I have personally reviewed all specimens and/or slides, including the   listed special stains, and used them  with my medical judgement to determine or confirm the final diagnosis.    Electronically signed out by:    Kat Rodrigues M.D., PhD, [facility]    CLINICAL HISTORY:  45 year old with breast cancer on chemo with vaginal bleeding.    GROSS:  A: The specimen is received in formalin with proper patient   identification, labeled \"EMB endometrium\".  The  specimen consists of a 2.5  x 2.0 x 0.3 cm aggregate of tan soft tissue and   hemorrhagic material.  This  filtered, wrapped and entirely submitted in cassette A1. (Dictated by:   Julita LOCK ASC 2/4/2021 12:08  PM)    MICROSCOPIC:  Microscopic examination was performed.    The technical component of this testing was completed at the [facility], with the professional component performed   at the [facility], [address] [place] [phone] ([phone])    CPT Codes:  A: 64954-LH4    COLLECTION SITE:  Client: [facility]" [facility]  Location: [facility] (B)           If you have any questions or concerns, please call the clinic at the number listed above.       Sincerely,      Manju Chaudhari MD

## 2021-02-03 NOTE — NURSING NOTE
Chief Complaint   Patient presents with     Consult For     Vaginal bleeding. On chemo.        See ESME Galvez 2/3/2021

## 2021-02-05 ENCOUNTER — TELEPHONE (OUTPATIENT)
Dept: ONCOLOGY | Facility: CLINIC | Age: 46
End: 2021-02-05

## 2021-02-05 LAB — COPATH REPORT: NORMAL

## 2021-02-05 NOTE — PROGRESS NOTES
CC:  Consult from Oncology clinic for vaginal bleeding x 18 days.   HPI: 44 yo  with metastatic breast cancer.  She is here for evaluation of vaginal bleeding that began after starting her recent chemotherapy regimen.  She noted bleeding for 18 days in a row, despite usually having normal cycles. No real pain with this.      Is using Zoladex, Xaralto, Ibrance, Anastrazole    Patients records are available and reviewed at today's visit.    Past GYN history:  No h/o abnormal pap, but has not had care in some time.   Last PAP smear:  uncertain    Past Medical History:   Diagnosis Date     Anxiety      Depression      DVT (deep venous thrombosis) (H) 2014     Left breast mass     x approximately 4-5 months     Pulmonary emboli (H)      Pyelonephritis      Schizoaffective disorder (H)      Tobacco use        Past Surgical History:   Procedure Laterality Date     TUBAL LIGATION  1998       Family History   Problem Relation Age of Onset     Lung Cancer Mother      Lung Cancer Father      Lupus Brother      Hypertension Other      Diabetes Other        Allergies: Patient has no known allergies.    Current Outpatient Medications   Medication Sig Dispense Refill     anastrozole (ARIMIDEX) 1 MG tablet Take 1 tablet (1 mg) by mouth daily for 28 days 28 tablet 0     Lidocaine (LIDOCARE) 4 % Patch Place 1-2 patches onto the skin every 24 hours To prevent lidocaine toxicity, patient should be patch free for 12 hrs daily. 30 patch 1     LORazepam (ATIVAN) 0.5 MG tablet Take 2 tablets (1 mg) by mouth every 4 hours as needed (Anxiety, Nausea/Vomiting or Sleep) 15 tablet 0     methocarbamol (ROBAXIN) 500 MG tablet Take 1 tablet (500 mg) by mouth 4 times daily as needed for muscle spasms 60 tablet 0     morphine (MS CONTIN) 15 MG CR tablet Take 1 tablet (15 mg) by mouth every 12 hours 14 tablet 0     nicotine (NICODERM CQ) 14 MG/24HR 24 hr patch Place 1 patch onto the skin daily 30 patch 1     ondansetron (ZOFRAN-ODT) 4 MG ODT tab  Take 1 tablet (4 mg) by mouth every 6 hours as needed for nausea or vomiting 120 tablet 0     oxyCODONE (OXY-IR) 5 MG capsule Take 2 capsules (10 mg) by mouth every 8 hours as needed for severe pain 42 capsule 0     palbociclib (IBRANCE) 125 MG tablet Take 1 tablet (125 mg) by mouth daily Take for 21 days, then 7 days off. 21 tablet 0     pantoprazole (PROTONIX) 40 MG EC tablet Take 1 tablet (40 mg) by mouth every morning (before breakfast) 30 tablet 1     polyethylene glycol (MIRALAX) 17 GM/Dose powder Take 17 g by mouth daily 510 g 1     prochlorperazine (COMPAZINE) 10 MG tablet Take 1 tablet (10 mg) by mouth every 6 hours as needed (Nausea/Vomiting) 30 tablet 2     QUEtiapine (SEROQUEL) 100 MG tablet Take 1 tablet (100 mg) by mouth At Bedtime 30 tablet 1     rivaroxaban ANTICOAGULANT (XARELTO) 20 MG TABS tablet Take 1 tablet (20 mg) by mouth daily (with dinner) 30 tablet 11     sertraline (ZOLOFT) 50 MG tablet Take 1 tablet (50 mg) by mouth daily 30 tablet 1     dexamethasone (DECADRON) 4 MG tablet Take 1 tablet (4 mg) by mouth every 8 hours for 7 days, THEN 1 tablet (4 mg) 2 times daily (with meals) for 7 days, THEN 1 tablet (4 mg) daily (with breakfast) for 7 days. 42 tablet 0       ROS:  C: NEGATIVE for fever, chills, change in weight  I: NEGATIVE for worrisome rashes, moles or lesions  E: NEGATIVE for vision changes or irritation  E/M: NEGATIVE for ear, mouth and throat problems  R: NEGATIVE for significant cough or SOB  CV: NEGATIVE for chest pain, palpitations or peripheral edema  GI: NEGATIVE for nausea, abdominal pain, heartburn, or change in bowel habits  : NEGATIVE for frequency, dysuria, hematuria, vaginal discharge  M: NEGATIVE for significant arthralgias or myalgia  N: NEGATIVE for weakness, dizziness or paresthesias  E: NEGATIVE for temperature intolerance, skin/hair changes  P: NEGATIVE for changes in mood or affect    EXAM:  Blood pressure 138/82, pulse 80, weight 111.9 kg (246 lb 11.2 oz), not  "currently breastfeeding.   BMI= Body mass index is 33.46 kg/m .  General - pleasant female in no acute distress.  Neck - supple without lymphadenopathy or thyromegaly.  Lungs - clear to auscultation bilaterally.  Heart - regular rate and rhythm without murmur.  Breast - no nodularity, asymmetry or nipple discharge bilaterally.  Abdomen - soft, nontender, nondistended, no hepatosplenomegaly.  Pelvic - EG: normal adult female, BUS: within normal limits, Vagina: well rugated, no discharge, Cervix: no lesions or CMT, Uterus: firm, normal sized and nontender, Adnexae: no masses or tenderness. Pap collected.   Rectovaginal - deferred.  Musculoskeletal - no gross deformities.  Neurological - normal strength, sensation, and mental status.    Endometrial Biopsy                                                                       Time Out - \"Pause for the Cause\"  Just before the procedure begins, through verbal and active participation of team members, verify:    Initials   Patient Name    smd   Patient Date of Birth smd   Procedure to be performed  smd   Site, laterality, level or multiples, noting patient position   smd   Relevant images or diagnostics available/displayed   smd   Implants, special equipment or special requirements        smd     Consent:  Verbal consent obtained from patient.  and Written consent signed and scanned into medical record.    Indication: prolonged bleeding with continued thick lining    Pregnancy test:  S/p Tubal ligation    Using a medium Graves speculum, the cervix was visualized. The cervix was prepped with Betadine.  A single tooth tenaculum was applied to the anterior lip of the cervix. The endometrial pipelle was advanced through the cervix without difficulty and a sample collected. One additional pass was made.  The tenaculum was removed from the cervix and the tenaculum site made hemostatic with pressure.    EBL: 2cc  Complications:  none  Pathology: EMB sample was sent to " pathology.  Tolerance of Procedure:  Patient did tolerate the procedure well.     Patient was instructed to call if she experiences any heavy bleeding, severe cramping, or abnormal vaginal discharge.  May take ibuprofen 400-800 mg PO TID PRN or naproxen 500 mg PO BID for cramping.    Will notify patient of results.    ASSESSMENT/PLAN:  Vaginal bleeding, menorrhagia in setting of metastatic breast cancer, chemotherapy and anticoagulant tx.      (N92.1) Menorrhagia with irregular cycle  (primary encounter diagnosis)  Comment:   Plan: ENDOMETRIAL BIOPSY w/o CERVICAL DILATION,         Surgical pathology exam, Obtaining, preparing         and conveyance of cervical or vaginal smear to         laboratory., Pap imaged thin layer screen with         HPV - recommended age 30 - 65 years (select HPV        order below), HPV High Risk Types DNA Cervical    We discussed there may be multifactorial cause for bleeding.  Given the recent start of chemotherapy, anastrazole can certainly lead to vaginal bleeding. The Xaralto can cause increased bleeding once it begins.  Given metastatic breast cancer, evaluation for other GYN related cancer is reasonable, though less likely. EMB done today.     Pt is currently not bleeding.  Advised if heavy bleeding, consider progesterone only therapy with Prometrium 5-10 mg/day if ok with ONCOLOGY team, though with ER [place] +/+ cancer, I recognize this is not ideal.     Letter will be sent to the referring provider.    Manju Chaudhari MD, FACOG  (she/her/hers)    Department of Ob/Gyn/Women's Health  [employer]  [facility]  [address] [place] [address]  [email or web address]  p. [phone]  f. [phone]

## 2021-02-05 NOTE — ORAL ONC MGMT
Oral Chemotherapy Monitoring Program    Subjective/Objective:  Teresa Sanderson is a 45 year old female contacted by phone for a follow-up visit for oral chemotherapy. Teresa began Ibrance on 1/29, and confirms taking 125 mg daily for 3 weeks on, then 1 week off. She states that she is tolerating this well - her only noticeable adverse effect was fatigue, and she states that this is tolerable and only notices it on some days. She denies any recent medication changes or missed doses of Ibrance.     ORAL CHEMOTHERAPY 1/11/2021 1/14/2021 1/19/2021 1/20/2021 1/20/2021 2/5/2021   Assessment Type Other Chart Review New Teach New Teach New Teach;Incoming phone call Initial Follow up   Diagnosis Code Breast Cancer Breast Cancer Breast Cancer Breast Cancer Breast Cancer Breast Cancer   Providers Dr. Dimitrios Plunkett   [facility]   Drug Name Ibrance (Palbociclib) Ibrance (Palbociclib) Ibrance (Palbociclib) Ibrance (Palbociclib) Ibrance (Palbociclib) Ibrance (Palbociclib)   Dose - - - - 125 mg 125 mg   Current Schedule - - - - Daily Daily   Cycle Details - - - - 3 weeks on, 1 week off 3 weeks on, 1 week off   Start Date of Last Cycle - - - - - 1/29/2021   Planned next cycle start date - - - - - 2/26/2021   Doses missed in last 2 weeks - - - - - 0   Adherence Assessment - - - - - Adherent   Adverse Effects - - - - - No AE identified during assessment   Any new drug interactions? - - - - Yes -   Pharmacist Intervention? - - - - Yes -   Intervention(s) - - - - Patient Education -   Is the dose as ordered appropriate for the patient? - - - - Yes -       Last PHQ-2 Score on record: No flowsheet data found.    Vitals:  BP:   BP Readings from Last 1 Encounters:   02/03/21 138/82     Wt Readings from Last 1 Encounters:   02/03/21 111.9 kg (246 lb 11.2 oz)     Estimated body surface area is 2.38 meters squared as  calculated from the following:    Height as of 12/6/20: 1.829 m (6').    Weight as of 2/3/21: 111.9 kg (246 lb 11.2 oz).    Labs:  _  Result Component Current Result Ref Range   Sodium 139 (1/18/2021) 133 - 144 mmol/L     _  Result Component Current Result Ref Range   Potassium 4.0 (1/18/2021) 3.4 - 5.3 mmol/L     _  Result Component Current Result Ref Range   Calcium 9.2 (1/18/2021) 8.5 - 10.1 mg/dL     No results found for Mag within last 30 days.     No results found for Phos within last 30 days.     _  Result Component Current Result Ref Range   Albumin 3.1 (L) (1/18/2021) 3.4 - 5.0 g/dL     _  Result Component Current Result Ref Range   Urea Nitrogen 11 (1/18/2021) 7 - 30 mg/dL     _  Result Component Current Result Ref Range   Creatinine 0.67 (1/18/2021) 0.52 - 1.04 mg/dL     _  Result Component Current Result Ref Range   AST 15 (1/18/2021) 0 - 45 U/L     _  Result Component Current Result Ref Range   ALT 44 (1/18/2021) 0 - 50 U/L     _  Result Component Current Result Ref Range   Bilirubin Total 0.2 (1/18/2021) 0.2 - 1.3 mg/dL     _  Result Component Current Result Ref Range   WBC 10.7 (1/18/2021) 4.0 - 11.0 10e9/L     _  Result Component Current Result Ref Range   Hemoglobin 11.7 (1/18/2021) 11.7 - 15.7 g/dL     _  Result Component Current Result Ref Range   Platelet Count 205 (1/18/2021) 150 - 450 10e9/L     _  Result Component Current Result Ref Range   Absolute Neutrophil 8.8 (H) (1/18/2021) 1.6 - 8.3 10e9/L         Assessment/Plan:  Teresa started Ibrance on 1/29 and is tolerating well. Continue current therapy.    Follow-Up:  2/15: Critical access hospital appt  2/16: labs (q2w for first 2 cycles)    Refill Due:  Next cycle starts 2/26    Zahra Arellano, GabyD, BCOP  Hematology/Oncology Clinical Pharmacist  [facility]  [facility]  [phone]

## 2021-02-08 ENCOUNTER — VIRTUAL VISIT (OUTPATIENT)
Dept: RADIATION ONCOLOGY | Facility: CLINIC | Age: 46
End: 2021-02-08
Attending: NURSE PRACTITIONER
Payer: COMMERCIAL

## 2021-02-08 DIAGNOSIS — C79.51 SPINE METASTASIS: Primary | ICD-10-CM

## 2021-02-08 LAB
COPATH REPORT: NORMAL
PAP: NORMAL

## 2021-02-08 NOTE — LETTER
"    2021         RE: Teresa Sanderson  [address] [place]  [facility] [address]        Dear Colleague,    Thank you for referring your patient, Teresa Sanderson, to the [facility] RADIATION ONCOLOGY. Please see a copy of my visit note below.    Radiation Oncology Follow-up Visit  2021    Teresa Sanderson  MRN: [id number]   : 1975   The patient has been notified of following:     Due to COVID19 pandemic.  \"This telephone visit will be conducted via a call between you and your physician/provider. We have found that certain health care needs can be provided without the need for a physical exam.  This service lets us provide the care you need with a short phone conversation.  If a prescription is necessary we can send it directly to your pharmacy.  If lab work is needed we can place an order for that and you can then stop by our lab to have the test done at a later time.    Telephone visits are billed at different rates depending on your insurance coverage. During this emergency period, for some insurers they may be billed the same as an in-person visit.  Please reach out to your insurance provider with any questions.    If during the course of the call the physician/provider feels a telephone visit is not appropriate, you will not be charged for this service.\"    Patient has given verbal consent for Telephone visit?  Yes    How would you like to obtain your AVS? Margarette    Phone call duration: 13 minutes    DISEASE TREATED:   Invasive ductal carcinoma with bone mets    RADIATION THERAPY DELIVERED:   3,000 cGy L3-L5 completed 2010    INTERVAL SINCE COMPLETION OF RADIATION THERAPY:   1 month    SUBJECTIVE:   Teresa Sanderson is a 45 year old female who is here today for routine 1 month follow up after completing radiation therapy. Overall she is doing well.  She has seen significant improvement in her back pain.  She rates her pain at a 4 on average and the pain " "medications help. She hurts more today as yesterday she had a super [employer] party and was very busy and on her feet.  She states she has a \"weak\" bladder, but has for years with no recent change.  She has tingling in her legs in the morning, but after she is up for a bit, it improves.  No change in lower extremity strength.  She has no radiation concerns.  She is now on Ibrance and tolerating that well.    ROS:  Complete review of systems is negative except for symptoms discussed in subjective above.    Current Outpatient Medications   Medication     anastrozole (ARIMIDEX) 1 MG tablet     dexamethasone (DECADRON) 4 MG tablet     Lidocaine (LIDOCARE) 4 % Patch     LORazepam (ATIVAN) 0.5 MG tablet     methocarbamol (ROBAXIN) 500 MG tablet     morphine (MS CONTIN) 15 MG CR tablet     nicotine (NICODERM CQ) 14 MG/24HR 24 hr patch     ondansetron (ZOFRAN-ODT) 4 MG ODT tab     oxyCODONE (OXY-IR) 5 MG capsule     palbociclib (IBRANCE) 125 MG tablet     pantoprazole (PROTONIX) 40 MG EC tablet     polyethylene glycol (MIRALAX) 17 GM/Dose powder     prochlorperazine (COMPAZINE) 10 MG tablet     QUEtiapine (SEROQUEL) 100 MG tablet     rivaroxaban ANTICOAGULANT (XARELTO) 20 MG TABS tablet     sertraline (ZOLOFT) 50 MG tablet     No current facility-administered medications for this visit.         No Known Allergies    Past Medical History:   Diagnosis Date     Anxiety      Depression      DVT (deep venous thrombosis) (H) 2014     Left breast mass     x approximately 4-5 months     Pulmonary emboli (H)      Pyelonephritis      Schizoaffective disorder (H)      Tobacco use        PHYSICAL EXAM:  There were no vitals taken for this visit.  Gen: Alert, in NAD      LABS AND IMAGING:  Reviewed.    IMPRESSION:   Ms. Sanderson is a 45 year old female with a metastatic IDC s/p radiation to L3-L5.    PLAN:   Patient has recovered nicely from acute side effects of radiation therapy. Her pain has improved, but is not completely gone.  She has " no side effects from treatment.  She will continue to follow with medical oncology.  She will call us or make an appointment if she has any future needs or concerns.      Nati Castillo NP  Radiation Oncology  [facility]

## 2021-02-08 NOTE — PROGRESS NOTES
"Radiation Oncology Follow-up Visit  2021    Teresa Sanderson  MRN: [id number]   : 1975   The patient has been notified of following:     Due to COVID19 pandemic.  \"This telephone visit will be conducted via a call between you and your physician/provider. We have found that certain health care needs can be provided without the need for a physical exam.  This service lets us provide the care you need with a short phone conversation.  If a prescription is necessary we can send it directly to your pharmacy.  If lab work is needed we can place an order for that and you can then stop by our lab to have the test done at a later time.    Telephone visits are billed at different rates depending on your insurance coverage. During this emergency period, for some insurers they may be billed the same as an in-person visit.  Please reach out to your insurance provider with any questions.    If during the course of the call the physician/provider feels a telephone visit is not appropriate, you will not be charged for this service.\"    Patient has given verbal consent for Telephone visit?  Yes    How would you like to obtain your AVS? Margarette    Phone call duration: 13 minutes    DISEASE TREATED:   Invasive ductal carcinoma with bone mets    RADIATION THERAPY DELIVERED:   3,000 cGy L3-L5 completed 2010    INTERVAL SINCE COMPLETION OF RADIATION THERAPY:   1 month    SUBJECTIVE:   Teresa Sanderson is a 45 year old female who is here today for routine 1 month follow up after completing radiation therapy. Overall she is doing well.  She has seen significant improvement in her back pain.  She rates her pain at a 4 on average and the pain medications help. She hurts more today as yesterday she had a super bowl party and was very busy and on her feet.  She states she has a \"weak\" bladder, but has for years with no recent change.  She has tingling in her legs in the morning, but after she is up for a bit, it " improves.  No change in lower extremity strength.  She has no radiation concerns.  She is now on Ibrance and tolerating that well.    ROS:  Complete review of systems is negative except for symptoms discussed in subjective above.    Current Outpatient Medications   Medication     anastrozole (ARIMIDEX) 1 MG tablet     dexamethasone (DECADRON) 4 MG tablet     Lidocaine (LIDOCARE) 4 % Patch     LORazepam (ATIVAN) 0.5 MG tablet     methocarbamol (ROBAXIN) 500 MG tablet     morphine (MS CONTIN) 15 MG CR tablet     nicotine (NICODERM CQ) 14 MG/24HR 24 hr patch     ondansetron (ZOFRAN-ODT) 4 MG ODT tab     oxyCODONE (OXY-IR) 5 MG capsule     palbociclib (IBRANCE) 125 MG tablet     pantoprazole (PROTONIX) 40 MG EC tablet     polyethylene glycol (MIRALAX) 17 GM/Dose powder     prochlorperazine (COMPAZINE) 10 MG tablet     QUEtiapine (SEROQUEL) 100 MG tablet     rivaroxaban ANTICOAGULANT (XARELTO) 20 MG TABS tablet     sertraline (ZOLOFT) 50 MG tablet     No current facility-administered medications for this visit.         No Known Allergies    Past Medical History:   Diagnosis Date     Anxiety      Depression      DVT (deep venous thrombosis) (H) 2014     Left breast mass     x approximately 4-5 months     Pulmonary emboli (H)      Pyelonephritis      Schizoaffective disorder (H)      Tobacco use          PHYSICAL EXAM:  There were no vitals taken for this visit.  Gen: Alert, in NAD        LABS AND IMAGING:  Reviewed.    IMPRESSION:   Ms. Sanderson is a 45 year old female with a metastatic IDC s/p radiation to L3-L5.    PLAN:   Patient has recovered nicely from acute side effects of radiation therapy. Her pain has improved, but is not completely gone.  She has no side effects from treatment.  She will continue to follow with medical oncology.  She will call us or make an appointment if she has any future needs or concerns.        Nati Castillo, NP  Radiation Oncology  [facility]

## 2021-02-10 LAB
FINAL DIAGNOSIS: NORMAL
HPV HR 12 DNA CVX QL NAA+PROBE: NEGATIVE
HPV16 DNA SPEC QL NAA+PROBE: NEGATIVE
HPV18 DNA SPEC QL NAA+PROBE: NEGATIVE
SPECIMEN DESCRIPTION: NORMAL
SPECIMEN SOURCE CVX/VAG CYTO: NORMAL

## 2021-02-13 DIAGNOSIS — C50.812 MALIGNANT NEOPLASM OF OVERLAPPING SITES OF LEFT BREAST IN FEMALE, ESTROGEN RECEPTOR POSITIVE (H): Primary | ICD-10-CM

## 2021-02-13 DIAGNOSIS — C79.51 CARCINOMA OF LEFT BREAST METASTATIC TO BONE (H): ICD-10-CM

## 2021-02-13 DIAGNOSIS — C50.912 CARCINOMA OF LEFT BREAST METASTATIC TO BONE (H): ICD-10-CM

## 2021-02-13 DIAGNOSIS — Z17.0 MALIGNANT NEOPLASM OF OVERLAPPING SITES OF LEFT BREAST IN FEMALE, ESTROGEN RECEPTOR POSITIVE (H): Primary | ICD-10-CM

## 2021-02-15 ENCOUNTER — VIRTUAL VISIT (OUTPATIENT)
Dept: ONCOLOGY | Facility: CLINIC | Age: 46
End: 2021-02-15
Attending: INTERNAL MEDICINE
Payer: COMMERCIAL

## 2021-02-15 DIAGNOSIS — C79.51 SPINE METASTASIS: ICD-10-CM

## 2021-02-15 DIAGNOSIS — C50.919 METASTATIC BREAST CANCER: Primary | ICD-10-CM

## 2021-02-15 PROCEDURE — 99443 PR PHYSICIAN TELEPHONE EVALUATION 21-30 MIN: CPT | Performed by: PHYSICIAN ASSISTANT

## 2021-02-15 RX ORDER — MORPHINE SULFATE 15 MG/1
15 TABLET, FILM COATED, EXTENDED RELEASE ORAL EVERY 12 HOURS
Qty: 14 TABLET | Refills: 0 | Status: SHIPPED | OUTPATIENT
Start: 2021-02-15 | End: 2021-03-03

## 2021-02-15 RX ORDER — OXYCODONE HYDROCHLORIDE 5 MG/1
5-10 CAPSULE ORAL EVERY 8 HOURS PRN
Qty: 30 CAPSULE | Refills: 0 | Status: SHIPPED | OUTPATIENT
Start: 2021-02-15 | End: 2021-03-03

## 2021-02-15 NOTE — PROGRESS NOTES
Oncology Visit:   Date on this visit: Feb 15, 2021      Diagnosis:  ER positive left breast cancer metastasized to bones.     Primary Physician: No Ref-Primary, Physician     History Of Present Illness:    Ms. Sanderson is a 45 year old female with a h/o tobacco abuse and DVTs with left breast cancer metastasized to bone. She presented to [place] ED with back pain on 12/5/2020. MRI of the L-spine showed an abnormal L4 lesion with associated right paraspinal mass, abnormalities in L5 and the left iliac bone were also seen. CT C/A/P showed a left breast mass, lytic lesions of T7, L4, and the pelvis, and a 3 cm lesion in the kidney (thought to be a cyst). Ultrasound of the bilateral lower extremities showed a non-occlusive thrombus in the left popliteal vein. Mammogram and ultrasound of the bilateral breasts on 12/17/2020 showed a spiculated mass measuring at least 7.8 cm at 12-1:00 left breast extending from the nipple to 9 cm from the nipple with associated nipple retraction. This mass was biopsied, and showed IDC with surrounding DCIS, grade 3, ER+ 90%, and TX+ 75%.  HER2 was equivocal in approximately 35% of tumor cells by FISH and was negative by IHC.    Metastatic Breast Cancer Treatment:  12/23/2021 - 1/7/2021  Radiation (3000 cGy) to the lumbar spine.  1/29/21- present Ibrance 125 mg days 1-21 every 28 days, anastrozole, zoladex    Interval History:  Ms. Sanderson was seen via video visit today for metastatic breast cancer follow-up. Treatment is going okay so far. She has been very tired the past few days. She is sleeping okay at night. No fevers/chills or URI symptoms. She has had some cracking of lips and mouth irritation and dryness. She also has some cracking on palms and dry skin. She is using aquaphor which helps. No nausea, bowel changes, or rash. Hot flashes are okay. Her appetite is intact.     Her back pain has increased since tapering down on dex. She has a few days left of taking the dose daily.  She is taking MS contin BID and is down to taking oxycodone 1 tab with each morphine. She is now out of this. Has been thinking about taking ibuprofen but wanted to check to make sure this is okay first. Pain is in same area. Does not feel as bad as prior to radiation but has increase with dex taper.     She has also had some heaviness in L axilla and side of breast. Feels achy and sometimes sharp. No other pain.     Past Medical/Surgical History:   Past Medical History:   Diagnosis Date     Anxiety      Depression      DVT (deep venous thrombosis) (H) 2014     Left breast mass     x approximately 4-5 months     Pulmonary emboli (H)      Pyelonephritis      Schizoaffective disorder (H)      Tobacco use      Past Surgical History:   Procedure Laterality Date     TUBAL LIGATION  1998      No Known Allergies    Current Outpatient Medications   Medication     anastrozole (ARIMIDEX) 1 MG tablet     dexamethasone (DECADRON) 4 MG tablet     Lidocaine (LIDOCARE) 4 % Patch     LORazepam (ATIVAN) 0.5 MG tablet     methocarbamol (ROBAXIN) 500 MG tablet     morphine (MS CONTIN) 15 MG CR tablet     nicotine (NICODERM CQ) 14 MG/24HR 24 hr patch     ondansetron (ZOFRAN-ODT) 4 MG ODT tab     oxyCODONE (OXY-IR) 5 MG capsule     palbociclib (IBRANCE) 125 MG tablet     pantoprazole (PROTONIX) 40 MG EC tablet     polyethylene glycol (MIRALAX) 17 GM/Dose powder     prochlorperazine (COMPAZINE) 10 MG tablet     QUEtiapine (SEROQUEL) 100 MG tablet     rivaroxaban ANTICOAGULANT (XARELTO) 20 MG TABS tablet     sertraline (ZOLOFT) 50 MG tablet     No current facility-administered medications for this visit.    '    Physical Exam:   General: Adult female in NAD.  Alert and oriented.  Eyes: No erythema or discharge   Respiratory: Breathing comfortably on room air. No wheezing or distress.   Musculoskeletal: Full ROM of the bilateral upper extremities.   Skin: No visible concerning skin rashes or lesions   Neuro: No notable tremor and  "dyskinetic movements.   Psych: Mood and affect appear normal.     The rest of a comprehensive physical examination is deferred due to PHE (public health emergency) video visit restrictions.     Laboratory/Imaging Studies   Labs to be drawn tomorrow     ASSESSMENT/PLAN:   45 year old female with history of DVT with left breast invasive ductal carcinoma, ER/HI positive, HER2 negative metastasized to bones.    1.  Metastatic breast cancer:  She completed palliative radiation to the lumbar spine and started on treatment last month with Ibrance, zoladex, and anastrozole. She is tolerating well besides mild mucositis and some dry skin. Will get first set of lab work tomorrow along with zoladex. She will complete \"on\" days of ibrance on 2/19 and then have her week off. I would like to see her again prior to cycle 2 to make sure her back pain is getting better.     2.  Bone metastases:  She is s/p XRT to the lumbar spine. She started zometa on  1/18/21 and will receive every 3 months. For pain she continues on MS Contin 15 mg BID and oxycodone twice per day.   Per her insurance company, from here forward, can only receive a 7 day supply at a time.    -Complete dex per taper.   -Supplement tylenol 1000 mg Q8h.   -Will check plts tomorrow to see if ibuprofen is okay.   -Refilled MS contin and oxy.     3.  DVT:  Recommend continuing Xarelto indefinitely with metastatic disease.      4.  Hot flashes/anxiety:  Continue Zoloft 50 mg PO daily and Seroquel 100 mg PO at bedtime.  Both medications were refilled today.    5. Mucositis: Discussed starting s/s rinses BID to TID.     6. Lip and skin cracking: Continue to apply aquaphor at least BID.       40 minutes spent on the date of the encounter doing chart review, patient visit and documentation     Alee Yang PA-C          "

## 2021-02-15 NOTE — LETTER
2/15/2021         RE: Teresa Sanderson  [address] [place]  [facility] [address]        Dear Colleague,    Thank you for referring your patient, Teresa Sanderson, to the [facility] CANCER CLINIC. Please see a copy of my visit note below.    Oncology Visit:   Date on this visit: Feb 15, 2021      Diagnosis:  ER positive left breast cancer metastasized to bones.     Primary Physician: No Ref-Primary, Physician     History Of Present Illness:    Ms. Sanderson is a 45 year old female with a h/o tobacco abuse and DVTs with left breast cancer metastasized to bone. She presented to [facility] ED with back pain on 12/5/2020. MRI of the L-spine showed an abnormal L4 lesion with associated right paraspinal mass, abnormalities in L5 and the left iliac bone were also seen. CT C/A/P showed a left breast mass, lytic lesions of T7, L4, and the pelvis, and a 3 cm lesion in the kidney (thought to be a cyst). Ultrasound of the bilateral lower extremities showed a non-occlusive thrombus in the left popliteal vein. Mammogram and ultrasound of the bilateral breasts on 12/17/2020 showed a spiculated mass measuring at least 7.8 cm at 12-1:00 left breast extending from the nipple to 9 cm from the nipple with associated nipple retraction. This mass was biopsied, and showed IDC with surrounding DCIS, grade 3, ER+ 90%, and [place]+ 75%.  HER2 was equivocal in approximately 35% of tumor cells by FISH and was negative by IHC.    Metastatic Breast Cancer Treatment:  12/23/2021 - 1/7/2021  Radiation (3000 cGy) to the lumbar spine.  1/29/21- present Ibrance 125 mg days 1-21 every 28 days, anastrozole, zoladex    Interval History:  Ms. Sanderson was seen via video visit today for metastatic breast cancer follow-up. Treatment is going okay so far. She has been very tired the past few days. She is sleeping okay at night. No fevers/chills or URI symptoms. She has had some cracking of lips and mouth irritation and dryness. She also  has some cracking on palms and dry skin. She is using aquaphor which helps. No nausea, bowel changes, or rash. Hot flashes are okay. Her appetite is intact.     Her back pain has increased since tapering down on dex. She has a few days left of taking the dose daily. She is taking MS contin BID and is down to taking oxycodone 1 tab with each morphine. She is now out of this. Has been thinking about taking ibuprofen but wanted to check to make sure this is okay first. Pain is in same area. Does not feel as bad as prior to radiation but has increase with dex taper.     She has also had some heaviness in L axilla and side of breast. Feels achy and sometimes sharp. No other pain.     Past Medical/Surgical History:   Past Medical History:   Diagnosis Date     Anxiety      Depression      DVT (deep venous thrombosis) (H) 2014     Left breast mass     x approximately 4-5 months     Pulmonary emboli (H)      Pyelonephritis      Schizoaffective disorder (H)      Tobacco use      Past Surgical History:   Procedure Laterality Date     TUBAL LIGATION  1998      No Known Allergies    Current Outpatient Medications   Medication     anastrozole (ARIMIDEX) 1 MG tablet     dexamethasone (DECADRON) 4 MG tablet     Lidocaine (LIDOCARE) 4 % Patch     LORazepam (ATIVAN) 0.5 MG tablet     methocarbamol (ROBAXIN) 500 MG tablet     morphine (MS CONTIN) 15 MG CR tablet     nicotine (NICODERM CQ) 14 MG/24HR 24 hr patch     ondansetron (ZOFRAN-ODT) 4 MG ODT tab     oxyCODONE (OXY-IR) 5 MG capsule     palbociclib (IBRANCE) 125 MG tablet     pantoprazole (PROTONIX) 40 MG EC tablet     polyethylene glycol (MIRALAX) 17 GM/Dose powder     prochlorperazine (COMPAZINE) 10 MG tablet     QUEtiapine (SEROQUEL) 100 MG tablet     rivaroxaban ANTICOAGULANT (XARELTO) 20 MG TABS tablet     sertraline (ZOLOFT) 50 MG tablet     No current facility-administered medications for this visit.    '    Physical Exam:   General: Adult female in NAD.  Alert and  "oriented.  Eyes: No erythema or discharge   Respiratory: Breathing comfortably on room air. No wheezing or distress.   Musculoskeletal: Full ROM of the bilateral upper extremities.   Skin: No visible concerning skin rashes or lesions   Neuro: No notable tremor and dyskinetic movements.   Psych: Mood and affect appear normal.     The rest of a comprehensive physical examination is deferred due to PHE (public health emergency) video visit restrictions.     Laboratory/Imaging Studies   Labs to be drawn tomorrow     ASSESSMENT/PLAN:   45 year old female with history of DVT with left breast invasive ductal carcinoma, ER/[place] positive, HER2 negative metastasized to bones.    1.  Metastatic breast cancer:  She completed palliative radiation to the lumbar spine and started on treatment last month with Ibrance, zoladex, and anastrozole. She is tolerating well besides mild mucositis and some dry skin. Will get first set of lab work tomorrow along with zoladex. She will complete \"on\" days of ibrance on 2/19 and then have her week off. I would like to see her again prior to cycle 2 to make sure her back pain is getting better.     2.  Bone metastases:  She is s/p XRT to the lumbar spine. She started zometa on  1/18/21 and will receive every 3 months. For pain she continues on MS Contin 15 mg BID and oxycodone twice per day.   Per her insurance company, from here forward, can only receive a 7 day supply at a time.    -Complete dex per taper.   -Supplement tylenol 1000 mg Q8h.   -Will check plts tomorrow to see if ibuprofen is okay.   -Refilled MS contin and oxy.     3.  DVT:  Recommend continuing Xarelto indefinitely with metastatic disease.      4.  Hot flashes/anxiety:  Continue Zoloft 50 mg PO daily and Seroquel 100 mg PO at bedtime.  Both medications were refilled today.    5. Mucositis: Discussed starting s/s rinses BID to TID.     6. Lip and skin cracking: Continue to apply aquaphor at least BID.       40 minutes spent on " the date of the encounter doing chart review, patient visit and documentation     Alee Yang PA-C            Teresa is a 45 year old who is being evaluated via a billable video visit.      How would you like to obtain your AVS? Mail a copy  If the video visit is dropped, the invitation should be resent by: Text to cell phone: [phone]  Will anyone else be joining your video visit? No     Vitals - Patient Reported  Weight (Patient Reported): 105.2 kg (232 lb)  Height (Patient Reported): 182.9 cm (6')  BMI (Based on Pt Reported Ht/Wt): 31.46  Pain Score: Extreme Pain (9)(LOW BACK)    Smitha MCELROY        Video Start Time: 4:31 PM  Video-Visit Details    Type of service:  Video Visit    Video End Time:4:55 pm    Originating Location (pt. Location): Home    Distant Location (provider location):  [facility] CANCER [facility]     Platform used for Video Visit: Well      Again, thank you for allowing me to participate in the care of your patient.        Sincerely,        Alee Yang PA-C

## 2021-02-15 NOTE — PROGRESS NOTES
Teresa is a 45 year old who is being evaluated via a billable video visit.      How would you like to obtain your AVS? Mail a copy  If the video visit is dropped, the invitation should be resent by: Text to cell phone: [phone]  Will anyone else be joining your video visit? No     Vitals - Patient Reported  Weight (Patient Reported): 105.2 kg (232 lb)  Height (Patient Reported): 182.9 cm (6')  BMI (Based on Pt Reported Ht/Wt): 31.46  Pain Score: Extreme Pain (9)(LOW BACK)    Smitha MCELROY        Video Start Time: 4:31 PM  Video-Visit Details    Type of service:  Video Visit    Video End Time:4:55 pm    Originating Location (pt. Location): Home    Distant Location (provider location):  [facility]     Platform used for Video Visit: [employer]

## 2021-02-16 ENCOUNTER — INFUSION THERAPY VISIT (OUTPATIENT)
Dept: ONCOLOGY | Facility: CLINIC | Age: 46
End: 2021-02-16
Attending: INTERNAL MEDICINE
Payer: COMMERCIAL

## 2021-02-16 VITALS
DIASTOLIC BLOOD PRESSURE: 80 MMHG | OXYGEN SATURATION: 97 % | RESPIRATION RATE: 17 BRPM | WEIGHT: 252.7 LBS | SYSTOLIC BLOOD PRESSURE: 120 MMHG | TEMPERATURE: 98.4 F | HEART RATE: 74 BPM | BODY MASS INDEX: 34.27 KG/M2

## 2021-02-16 DIAGNOSIS — Z53.9 ERRONEOUS ENCOUNTER--DISREGARD: Primary | ICD-10-CM

## 2021-02-16 DIAGNOSIS — C79.51 CARCINOMA OF LEFT BREAST METASTATIC TO BONE (H): Primary | ICD-10-CM

## 2021-02-16 DIAGNOSIS — C50.812 MALIGNANT NEOPLASM OF OVERLAPPING SITES OF LEFT BREAST IN FEMALE, ESTROGEN RECEPTOR POSITIVE (H): ICD-10-CM

## 2021-02-16 DIAGNOSIS — C50.912 CARCINOMA OF LEFT BREAST METASTATIC TO BONE (H): Primary | ICD-10-CM

## 2021-02-16 DIAGNOSIS — Z17.0 MALIGNANT NEOPLASM OF OVERLAPPING SITES OF LEFT BREAST IN FEMALE, ESTROGEN RECEPTOR POSITIVE (H): ICD-10-CM

## 2021-02-16 LAB
ALBUMIN SERPL-MCNC: 3 G/DL (ref 3.4–5)
ALP SERPL-CCNC: 90 U/L (ref 40–150)
ALT SERPL W P-5'-P-CCNC: 22 U/L (ref 0–50)
ANION GAP SERPL CALCULATED.3IONS-SCNC: 8 MMOL/L (ref 3–14)
ANISOCYTOSIS BLD QL SMEAR: SLIGHT
AST SERPL W P-5'-P-CCNC: 7 U/L (ref 0–45)
BASOPHILS # BLD AUTO: 0.1 10E9/L (ref 0–0.2)
BASOPHILS NFR BLD AUTO: 2.6 %
BILIRUB SERPL-MCNC: 0.2 MG/DL (ref 0.2–1.3)
BUN SERPL-MCNC: 16 MG/DL (ref 7–30)
CALCIUM SERPL-MCNC: 8.4 MG/DL (ref 8.5–10.1)
CANCER AG27-29 SERPL-ACNC: 51 U/ML (ref 0–39)
CEA SERPL-MCNC: 2.2 UG/L (ref 0–2.5)
CHLORIDE SERPL-SCNC: 111 MMOL/L (ref 94–109)
CO2 SERPL-SCNC: 26 MMOL/L (ref 20–32)
CREAT SERPL-MCNC: 0.99 MG/DL (ref 0.52–1.04)
DIFFERENTIAL METHOD BLD: ABNORMAL
EOSINOPHIL # BLD AUTO: 0 10E9/L (ref 0–0.7)
EOSINOPHIL NFR BLD AUTO: 0 %
ERYTHROCYTE [DISTWIDTH] IN BLOOD BY AUTOMATED COUNT: 16.7 % (ref 10–15)
ESTRADIOL SERPL-MCNC: 17 PG/ML
FSH SERPL-ACNC: 6.1 IU/L
GFR SERPL CREATININE-BSD FRML MDRD: 68 ML/MIN/{1.73_M2}
GLUCOSE SERPL-MCNC: 123 MG/DL (ref 70–99)
HCT VFR BLD AUTO: 30.8 % (ref 35–47)
HGB BLD-MCNC: 10 G/DL (ref 11.7–15.7)
LYMPHOCYTES # BLD AUTO: 0.9 10E9/L (ref 0.8–5.3)
LYMPHOCYTES NFR BLD AUTO: 27.8 %
MCH RBC QN AUTO: 29.4 PG (ref 26.5–33)
MCHC RBC AUTO-ENTMCNC: 32.5 G/DL (ref 31.5–36.5)
MCV RBC AUTO: 91 FL (ref 78–100)
MONOCYTES # BLD AUTO: 0.1 10E9/L (ref 0–1.3)
MONOCYTES NFR BLD AUTO: 3.5 %
NEUTROPHILS # BLD AUTO: 2 10E9/L (ref 1.6–8.3)
NEUTROPHILS NFR BLD AUTO: 66.1 %
PLATELET # BLD AUTO: 117 10E9/L (ref 150–450)
PLATELET # BLD EST: ABNORMAL 10*3/UL
POTASSIUM SERPL-SCNC: 3.6 MMOL/L (ref 3.4–5.3)
PROT SERPL-MCNC: 6.5 G/DL (ref 6.8–8.8)
RBC # BLD AUTO: 3.4 10E12/L (ref 3.8–5.2)
SODIUM SERPL-SCNC: 145 MMOL/L (ref 133–144)
WBC # BLD AUTO: 3.1 10E9/L (ref 4–11)

## 2021-02-16 PROCEDURE — 36592 COLLECT BLOOD FROM PICC: CPT

## 2021-02-16 PROCEDURE — 86300 IMMUNOASSAY TUMOR CA 15-3: CPT | Performed by: INTERNAL MEDICINE

## 2021-02-16 PROCEDURE — 83001 ASSAY OF GONADOTROPIN (FSH): CPT | Performed by: INTERNAL MEDICINE

## 2021-02-16 PROCEDURE — 82378 CARCINOEMBRYONIC ANTIGEN: CPT | Performed by: INTERNAL MEDICINE

## 2021-02-16 PROCEDURE — 85025 COMPLETE CBC W/AUTO DIFF WBC: CPT | Performed by: INTERNAL MEDICINE

## 2021-02-16 PROCEDURE — 250N000011 HC RX IP 250 OP 636: Performed by: INTERNAL MEDICINE

## 2021-02-16 PROCEDURE — 82670 ASSAY OF TOTAL ESTRADIOL: CPT | Performed by: INTERNAL MEDICINE

## 2021-02-16 PROCEDURE — 80053 COMPREHEN METABOLIC PANEL: CPT | Performed by: INTERNAL MEDICINE

## 2021-02-16 PROCEDURE — 96402 CHEMO HORMON ANTINEOPL SQ/IM: CPT

## 2021-02-16 RX ADMIN — GOSERELIN ACETATE 3.6 MG: 3.6 IMPLANT SUBCUTANEOUS at 14:45

## 2021-02-16 ASSESSMENT — PAIN SCALES - GENERAL: PAINLEVEL: EXTREME PAIN (8)

## 2021-02-16 NOTE — LETTER
2/16/2021         RE: Teresa Sanderson  [address] [place]  [facility] [address]        Dear Colleague,    Thank you for referring your patient, Teresa Sanderson, to the [facility] CANCER [facility]INIC. Please see a copy of my visit note below.      This encounter was opened in error. Please disregard.      Again, thank you for allowing me to participate in the care of your patient.        Sincerely,        [facility]

## 2021-02-16 NOTE — PROGRESS NOTES
Infusion Nursing Note:  Teresa Sanderson presents today for Zoladex.    Patient seen by provider today: Yes: HAYDE Kim 2/15, no changes.   present during visit today: Not Applicable.    Note: Per HAYDE Kim note from 2/15:  Zometa on  1/18/21 and will receive every 3 months    Pt saw provider 2/15, no changes overnight.    Intravenous Access:  No Intravenous access at this visit.    Treatment Conditions:  Not Applicable.      Post Infusion Assessment:  Patient tolerated injection into RUQ of abdomen without incident.   Ice applied prior to injection.    Discharge Plan:   Patient declined prescription refills.  Discharge instructions reviewed with: Patient.  Patient and/or family verbalized understanding of discharge instructions and all questions answered.  Copy of AVS given to pt.  Patient will return 3/1 for lab and 3/3 for provider.   Patient discharged in stable condition accompanied by: self.  Departure Mode: Ambulatory.    Anh Park RN

## 2021-02-16 NOTE — NURSING NOTE
Chief Complaint   Patient presents with     Blood Draw     IV placed, blood drawn, vitals taken, checked into next appointment.     Labs drawn via IV placed by Carmen Flannery MA in left arm.    Carmen Flannery MA

## 2021-02-16 NOTE — PATIENT INSTRUCTIONS
Contact Numbers  [facility]: [phone]    After Hours:  [phone]  Triage: [phone]    Please call the [facility] Triage line if you experience a temperature greater than or equal to 100.5, shaking chills, have uncontrolled nausea, vomiting and/or diarrhea, dizziness, shortness of breath, chest pain, bleeding, unexplained bruising, or if you have any other new/concerning symptoms, questions or concerns.     If it is after hours, weekends, or holidays, please call the main hospital  at  [phone] and ask to speak to the Oncology doctor on call.     If you are having any concerning symptoms or wish to speak to a provider before your next infusion visit, please call your care coordinator or triage to notify them so we can adequately serve you.     If you need a refill on a narcotic prescription or other medication, please call triage before your infusion appointment.       February 2021 Sunday Monday Tuesday Wednesday Thursday Friday Saturday        1    LAB PERIPHERAL   2:30 PM   (15 min.)   [facility] DRAW   [facility] 2     3    [facility] NEW  11:00 AM   (30 min.)   Manju Chaudhari MD   [facility] TRANSVAGINAL NON OB  11:30 AM   (30 min.)   [email or web address]   [facility] 4     5     6       7     8    TELEPHONE VISIT RETURN   2:30 PM   (15 min.)   Nati Castillo APRN CNP   [facility] Radiation Oncology 9     10     11     12     13       14     15    VIDEO VISIT RETURN   4:15 PM   (50 min.)   Alee Yang PA-C   [facility] 16    LAB PERIPHERAL   2:00 PM   (15 min.)   [facility] DRAW   [facility] ONC INFUSION 30   2:30 PM   (30 min.)    ONCOLOGY INFUSION   [facility] 17     18     19     20       21     22     23     24     25     26     27       28                                                March 2021 Sunday Monday Tuesday Wednesday Thursday Friday Saturday        1    LAB PERIPHERAL   9:15 AM   (15 min.)    [facility]   [facility] 2     3    VIDEO VISIT RETURN  12:45 PM   (50 min.)   Alee Yang PA-C   [facility] 4     5     6       7     8     9     10     11     12     13       14     15     16     17     18     19     20       21     22     23     24     25     26     27       28     29     30     31                                   Recent Results (from the past 24 hour(s))   CBC with platelets differential    Collection Time: 02/16/21  2:24 PM   Result Value Ref Range    WBC 3.1 (L) 4.0 - 11.0 10e9/L    RBC Count 3.40 (L) 3.8 - 5.2 10e12/L    Hemoglobin 10.0 (L) 11.7 - 15.7 g/dL    Hematocrit 30.8 (L) 35.0 - 47.0 %    MCV 91 78 - 100 fl    MCH 29.4 26.5 - 33.0 pg    MCHC 32.5 31.5 - 36.5 g/dL    RDW 16.7 (H) 10.0 - 15.0 %    Platelet Count 117 (L) 150 - 450 10e9/L    Diff Method PENDING

## 2021-02-18 DIAGNOSIS — C79.51 CARCINOMA OF LEFT BREAST METASTATIC TO BONE (H): ICD-10-CM

## 2021-02-18 DIAGNOSIS — Z17.0 MALIGNANT NEOPLASM OF OVERLAPPING SITES OF LEFT BREAST IN FEMALE, ESTROGEN RECEPTOR POSITIVE (H): Primary | ICD-10-CM

## 2021-02-18 DIAGNOSIS — C50.812 MALIGNANT NEOPLASM OF OVERLAPPING SITES OF LEFT BREAST IN FEMALE, ESTROGEN RECEPTOR POSITIVE (H): Primary | ICD-10-CM

## 2021-02-18 DIAGNOSIS — C50.912 CARCINOMA OF LEFT BREAST METASTATIC TO BONE (H): ICD-10-CM

## 2021-02-18 RX ORDER — ANASTROZOLE 1 MG/1
1 TABLET ORAL DAILY
Qty: 28 TABLET | Refills: 0 | Status: SHIPPED | OUTPATIENT
Start: 2021-02-18 | End: 2021-03-18

## 2021-03-01 VITALS
SYSTOLIC BLOOD PRESSURE: 128 MMHG | HEART RATE: 60 BPM | OXYGEN SATURATION: 100 % | DIASTOLIC BLOOD PRESSURE: 83 MMHG | TEMPERATURE: 98.6 F | BODY MASS INDEX: 34.69 KG/M2 | RESPIRATION RATE: 18 BRPM | WEIGHT: 255.8 LBS

## 2021-03-01 DIAGNOSIS — C50.919 METASTATIC BREAST CANCER: ICD-10-CM

## 2021-03-01 DIAGNOSIS — C79.51 SPINE METASTASIS: ICD-10-CM

## 2021-03-01 LAB
ALBUMIN SERPL-MCNC: 3.4 G/DL (ref 3.4–5)
ALP SERPL-CCNC: 97 U/L (ref 40–150)
ALT SERPL W P-5'-P-CCNC: 37 U/L (ref 0–50)
ANION GAP SERPL CALCULATED.3IONS-SCNC: 4 MMOL/L (ref 3–14)
AST SERPL W P-5'-P-CCNC: 12 U/L (ref 0–45)
BASOPHILS # BLD AUTO: 0 10E9/L (ref 0–0.2)
BASOPHILS NFR BLD AUTO: 0.3 %
BILIRUB SERPL-MCNC: 0.2 MG/DL (ref 0.2–1.3)
BUN SERPL-MCNC: 14 MG/DL (ref 7–30)
CALCIUM SERPL-MCNC: 9.6 MG/DL (ref 8.5–10.1)
CANCER AG27-29 SERPL-ACNC: 61 U/ML (ref 0–39)
CEA SERPL-MCNC: 3.3 UG/L (ref 0–2.5)
CHLORIDE SERPL-SCNC: 110 MMOL/L (ref 94–109)
CO2 SERPL-SCNC: 29 MMOL/L (ref 20–32)
CREAT SERPL-MCNC: 0.93 MG/DL (ref 0.52–1.04)
DIFFERENTIAL METHOD BLD: ABNORMAL
EOSINOPHIL # BLD AUTO: 0 10E9/L (ref 0–0.7)
EOSINOPHIL NFR BLD AUTO: 0.2 %
ERYTHROCYTE [DISTWIDTH] IN BLOOD BY AUTOMATED COUNT: 17.4 % (ref 10–15)
GFR SERPL CREATININE-BSD FRML MDRD: 74 ML/MIN/{1.73_M2}
GLUCOSE SERPL-MCNC: 94 MG/DL (ref 70–99)
HCT VFR BLD AUTO: 33.5 % (ref 35–47)
HGB BLD-MCNC: 10.6 G/DL (ref 11.7–15.7)
IMM GRANULOCYTES # BLD: 0.1 10E9/L (ref 0–0.4)
IMM GRANULOCYTES NFR BLD: 2.1 %
LYMPHOCYTES # BLD AUTO: 1.3 10E9/L (ref 0.8–5.3)
LYMPHOCYTES NFR BLD AUTO: 20.7 %
MCH RBC QN AUTO: 29.9 PG (ref 26.5–33)
MCHC RBC AUTO-ENTMCNC: 31.6 G/DL (ref 31.5–36.5)
MCV RBC AUTO: 94 FL (ref 78–100)
MONOCYTES # BLD AUTO: 0.8 10E9/L (ref 0–1.3)
MONOCYTES NFR BLD AUTO: 13 %
NEUTROPHILS # BLD AUTO: 3.9 10E9/L (ref 1.6–8.3)
NEUTROPHILS NFR BLD AUTO: 63.7 %
NRBC # BLD AUTO: 0 10*3/UL
NRBC BLD AUTO-RTO: 0 /100
PLATELET # BLD AUTO: 203 10E9/L (ref 150–450)
POTASSIUM SERPL-SCNC: 3.7 MMOL/L (ref 3.4–5.3)
PROT SERPL-MCNC: 7.4 G/DL (ref 6.8–8.8)
RBC # BLD AUTO: 3.55 10E12/L (ref 3.8–5.2)
SODIUM SERPL-SCNC: 143 MMOL/L (ref 133–144)
WBC # BLD AUTO: 6.1 10E9/L (ref 4–11)

## 2021-03-01 PROCEDURE — 86300 IMMUNOASSAY TUMOR CA 15-3: CPT | Performed by: PHYSICIAN ASSISTANT

## 2021-03-01 PROCEDURE — 82378 CARCINOEMBRYONIC ANTIGEN: CPT | Performed by: PHYSICIAN ASSISTANT

## 2021-03-01 PROCEDURE — 85025 COMPLETE CBC W/AUTO DIFF WBC: CPT | Performed by: PHYSICIAN ASSISTANT

## 2021-03-01 PROCEDURE — 80053 COMPREHEN METABOLIC PANEL: CPT | Performed by: PHYSICIAN ASSISTANT

## 2021-03-01 ASSESSMENT — PAIN SCALES - GENERAL: PAINLEVEL: EXTREME PAIN (8)

## 2021-03-01 NOTE — NURSING NOTE
Chief Complaint   Patient presents with     Blood Draw     Vitals, blood drawn via VPT by SRIDHAR.      FLOR Valdovinos LPN

## 2021-03-03 ENCOUNTER — VIRTUAL VISIT (OUTPATIENT)
Dept: ONCOLOGY | Facility: CLINIC | Age: 46
End: 2021-03-03
Attending: PHYSICIAN ASSISTANT
Payer: COMMERCIAL

## 2021-03-03 DIAGNOSIS — C79.51 CARCINOMA OF LEFT BREAST METASTATIC TO BONE (H): ICD-10-CM

## 2021-03-03 DIAGNOSIS — M54.41 MIDLINE LOW BACK PAIN WITH RIGHT-SIDED SCIATICA, UNSPECIFIED CHRONICITY: ICD-10-CM

## 2021-03-03 DIAGNOSIS — C50.919 METASTATIC BREAST CANCER: Primary | ICD-10-CM

## 2021-03-03 DIAGNOSIS — C79.51 SPINE METASTASIS: ICD-10-CM

## 2021-03-03 DIAGNOSIS — C50.812 MALIGNANT NEOPLASM OF OVERLAPPING SITES OF LEFT BREAST IN FEMALE, ESTROGEN RECEPTOR POSITIVE (H): ICD-10-CM

## 2021-03-03 DIAGNOSIS — C50.912 CARCINOMA OF LEFT BREAST METASTATIC TO BONE (H): ICD-10-CM

## 2021-03-03 DIAGNOSIS — Z17.0 MALIGNANT NEOPLASM OF OVERLAPPING SITES OF LEFT BREAST IN FEMALE, ESTROGEN RECEPTOR POSITIVE (H): ICD-10-CM

## 2021-03-03 DIAGNOSIS — I82.432 ACUTE DEEP VEIN THROMBOSIS (DVT) OF POPLITEAL VEIN OF LEFT LOWER EXTREMITY (H): ICD-10-CM

## 2021-03-03 PROCEDURE — 99215 OFFICE O/P EST HI 40 MIN: CPT | Mod: 95 | Performed by: PHYSICIAN ASSISTANT

## 2021-03-03 RX ORDER — GABAPENTIN 300 MG/1
300 CAPSULE ORAL AT BEDTIME
Qty: 90 CAPSULE | Refills: 1 | Status: ON HOLD | OUTPATIENT
Start: 2021-03-03 | End: 2021-03-24

## 2021-03-03 RX ORDER — MORPHINE SULFATE 15 MG/1
15 TABLET, FILM COATED, EXTENDED RELEASE ORAL EVERY 12 HOURS
Qty: 14 TABLET | Refills: 0 | Status: ON HOLD | OUTPATIENT
Start: 2021-03-03 | End: 2021-03-24

## 2021-03-03 RX ORDER — DEXAMETHASONE 4 MG/1
4 TABLET ORAL 2 TIMES DAILY WITH MEALS
Qty: 14 TABLET | Refills: 0 | Status: SHIPPED | OUTPATIENT
Start: 2021-03-03 | End: 2021-03-21

## 2021-03-03 RX ORDER — LORAZEPAM 0.5 MG/1
1 TABLET ORAL EVERY 4 HOURS PRN
Qty: 15 TABLET | Refills: 0 | Status: SHIPPED | OUTPATIENT
Start: 2021-03-03 | End: 2021-03-21

## 2021-03-03 RX ORDER — OXYCODONE HYDROCHLORIDE 5 MG/1
5-10 CAPSULE ORAL EVERY 8 HOURS PRN
Qty: 30 CAPSULE | Refills: 0 | Status: ON HOLD | OUTPATIENT
Start: 2021-03-03 | End: 2021-03-24

## 2021-03-03 RX ORDER — POLYETHYLENE GLYCOL 3350 17 G/17G
17 POWDER, FOR SOLUTION ORAL DAILY
Qty: 510 G | Refills: 1 | Status: SHIPPED | OUTPATIENT
Start: 2021-03-03 | End: 2021-06-04

## 2021-03-03 NOTE — LETTER
3/3/2021         RE: Teresa Sanderson  [address] [place]  [place] [address]        Dear Colleague,    Thank you for referring your patient, Teresa Sanderson, to the [facility]. Please see a copy of my visit note below.    Teresa is a 45 year old who is being evaluated via a billable video visit.      How would you like to obtain your AVS? Mail a copy  If the video visit is dropped, the invitation should be resent by: Text to cell phone: [phone]  Will anyone else be joining your video visit? No       I have reviewed and updated the patient's allergies and medication list.    Concerns: migraines/headaches for about 2 weeks. Notes blurred vision, sensitivity to light/sound/smell and nausea. Also states she has been quite constipated and says she's been very short of breath lately.  Refills: oxycodone, morphine, xarelto, lorazepam, methocarbamol and dexamethasone.     Vitals - Patient Reported  Weight (Patient Reported): 115.7 kg (255 lb)  Height (Patient Reported): 182.9 cm (6')  BMI (Based on Pt Reported Ht/Wt): 34.58  Pain Score: Extreme Pain (9)  Pain Loc: Low Back      Tootie Oshea CMA        Video Start Time: 1:11 PM  Video-Visit Details    Type of service:  Video Visit    Video End Time:1:40 PM    Originating Location (pt. Location): Home    Distant Location (provider location):  [facility]     Platform used for Video Visit: [facility]      Oncology Visit:   Date on this visit: Mar 3, 2021      Diagnosis:  ER positive left breast cancer metastasized to bones.     Primary Physician: No Ref-Primary, Physician     History Of Present Illness:    Ms. Sanderson is a 45 year old female with a h/o tobacco abuse and DVTs with left breast cancer metastasized to bone. She presented to [place] ED with back pain on 12/5/2020. MRI of the L-spine showed an abnormal L4 lesion with associated right paraspinal mass, abnormalities in L5 and the left  iliac bone were also seen. CT C/A/P showed a left breast mass, lytic lesions of T7, L4, and the pelvis, and a 3 cm lesion in the kidney (thought to be a cyst). Ultrasound of the bilateral lower extremities showed a non-occlusive thrombus in the left popliteal vein. Mammogram and ultrasound of the bilateral breasts on 12/17/2020 showed a spiculated mass measuring at least 7.8 cm at 12-1:00 left breast extending from the nipple to 9 cm from the nipple with associated nipple retraction. This mass was biopsied, and showed IDC with surrounding DCIS, grade 3, ER+ 90%, and NM+ 75%.  HER2 was equivocal in approximately 35% of tumor cells by FISH and was negative by IHC.    Metastatic Breast Cancer Treatment:  12/23/2021 - 1/7/2021  Radiation (3000 cGy) to the lumbar spine.  1/29/21- present Ibrance 125 mg days 1-21 every 28 days, anastrozole, zoladex    Interval History:  Ms. Sanderson was seen via video visit today for metastatic breast cancer follow-up. Since we last talked a few weeks ago she has a number of issues.     She is having worsening hot flashes and is starting to have migraines with nausea and light sensitivity a few days per week start in the past 2 weeks.     She has fairly constant blurry vision. No diplopia.     Her low back is feeling worse. She needs refill of pain medication. She is having n/t of R posterior leg down to heel. Leg feels heavy.     She has some R hip pain too which is relatively new.    Feels tired and has been very inactive because of her back. She now is easily fatigued and winded when walking through the grocery store. Does better with a cart.     Has not had any fevers/chills, cough, SOB at rest, CP, or pleuritic pain. No edema.     She has been constipated. She has not been taking any bowel meds on opioids. Appetite is great. No nausea apart from with migraines.     She started Ibrance cycle on 2/26.     Past Medical/Surgical History:   Past Medical History:   Diagnosis Date      Anxiety      Depression      DVT (deep venous thrombosis) (H) 2014     Left breast mass     x approximately 4-5 months     Pulmonary emboli (H)      Pyelonephritis      Schizoaffective disorder (H)      Tobacco use      Past Surgical History:   Procedure Laterality Date     TUBAL LIGATION  1998      No Known Allergies    Current Outpatient Medications   Medication     anastrozole (ARIMIDEX) 1 MG tablet     dexamethasone (DECADRON) 4 MG tablet     Lidocaine (LIDOCARE) 4 % Patch     LORazepam (ATIVAN) 0.5 MG tablet     methocarbamol (ROBAXIN) 500 MG tablet     morphine (MS CONTIN) 15 MG CR tablet     nicotine (NICODERM CQ) 14 MG/24HR 24 hr patch     ondansetron (ZOFRAN-ODT) 4 MG ODT tab     oxyCODONE (OXY-IR) 5 MG capsule     palbociclib (IBRANCE) 125 MG tablet     pantoprazole (PROTONIX) 40 MG EC tablet     polyethylene glycol (MIRALAX) 17 GM/Dose powder     prochlorperazine (COMPAZINE) 10 MG tablet     QUEtiapine (SEROQUEL) 100 MG tablet     rivaroxaban ANTICOAGULANT (XARELTO) 20 MG TABS tablet     sertraline (ZOLOFT) 50 MG tablet     No current facility-administered medications for this visit.    '    Physical Exam:   General: Adult female in NAD.  Alert and oriented.  Eyes: No erythema or discharge   Respiratory: Breathing comfortably on room air. No wheezing or distress.   Musculoskeletal: Full ROM of the bilateral upper extremities.   Skin: No visible concerning skin rashes or lesions   Neuro: No notable tremor and dyskinetic movements.   Psych: Mood and affect appear normal.     The rest of a comprehensive physical examination is deferred due to PHE (public health emergency) video visit restrictions.     Laboratory/Imaging Studies    3/1/2021 09:39   Sodium 143   Potassium 3.7   Chloride 110 (H)   Carbon Dioxide 29   Urea Nitrogen 14   Creatinine 0.93   GFR Estimate 74   GFR Estimate If Black 86   Calcium 9.6   Anion Gap 4   Albumin 3.4   Protein Total 7.4   Bilirubin Total 0.2   Alkaline Phosphatase 97   ALT  37   AST 12   CA 27-29 61 (H)   Glucose 94   WBC 6.1   Hemoglobin 10.6 (L)   Hematocrit 33.5 (L)   Platelet Count 203   RBC Count 3.55 (L)   MCV 94   MCH 29.9   MCHC 31.6   RDW 17.4 (H)   Diff Method Automated Method   % Neutrophils 63.7   % Lymphocytes 20.7   % Monocytes 13.0   % Eosinophils 0.2   % Basophils 0.3   % Immature Granulocytes 2.1   Nucleated RBCs 0   Absolute Neutrophil 3.9   Absolute Lymphocytes 1.3   Absolute Monocytes 0.8   Absolute Eosinophils 0.0   Absolute Basophils 0.0   Abs Immature Granulocytes 0.1   Absolute Nucleated RBC 0.0   CEA 3.3 (H)      3/1/2021 09:39   WBC 6.1   Hemoglobin 10.6 (L)   Hematocrit 33.5 (L)   Platelet Count 203   RBC Count 3.55 (L)   MCV 94   MCH 29.9   MCHC 31.6   RDW 17.4 (H)   Diff Method Automated Method   % Neutrophils 63.7   % Lymphocytes 20.7   % Monocytes 13.0   % Eosinophils 0.2   % Basophils 0.3   % Immature Granulocytes 2.1   Nucleated RBCs 0   Absolute Neutrophil 3.9   Absolute Lymphocytes 1.3   Absolute Monocytes 0.8   Absolute Eosinophils 0.0   Absolute Basophils 0.0   Abs Immature Granulocytes 0.1   Absolute Nucleated RBC 0.0      1/18/2021 14:10 2/16/2021 14:24 3/1/2021 09:39   CA 27-29 84 (H) 51 (H) 61 (H)   CEA 2.6 (H) 2.2 3.3 (H)       ASSESSMENT/PLAN:   45 year old female with history of DVT with left breast invasive ductal carcinoma, ER/ND positive, HER2 negative metastasized to bones.    1.  Metastatic breast cancer:  She completed palliative radiation to the lumbar spine and started on treatment in January with Ibrance, zoladex, and anastrozole. She is tolerating well besides hot flashes and migraines. She started cycle 2 on 2/26/21 and received second zoladex on 2/16. CBC from 2 days ago shows no neutropenia.     Tumor markers have generally declined since January but I am concerned she is still having so much pain and radicular symptoms. See below.     Will plan for follow-up prior to cycle 3, week of 3/26.     2.  Bone metastases:  She is s/p  XRT to the lumbar spine. She started zometa on  1/18/21 and will receive every 3 months. For pain she continues on MS Contin 15 mg BID and oxycodone PRN.   Per her insurance company, from here forward, can only receive a 7 day supply at a time.    -Refilled MS contin and oxy.   -With increase in pain and leg symptoms, will repeat MRI of L-spine in the next week.  -Start dex 4 mg BID for now and then reassess based on MRI.     3.  DVT:  Recommend continuing Xarelto indefinitely with metastatic disease.      4.  Anxiety:  Continue Zoloft 50 mg PO daily and Seroquel 100 mg PO at bedtime.     5. Migraines/hot flashes: Will start gabapentin 300 mg q hs. This should also help radicular symptoms. Will need to monitor for CNS depression with seroquel--she says this doesn't make her tired at all and she was on TID dosing at one time.     5. Blurry vision: Could be from prior dex. Negative brain MRI in December. Recommended seeing eye doctor.     6. Opioid-induced constipation: Refilled miralax to use daily.     50 minutes spent on the date of the encounter doing chart review, review of test results, interpretation of tests, patient visit, documentation and care coordination.      Alee Yang PA-C             Again, thank you for allowing me to participate in the care of your patient.        Sincerely,        Alee Yang PA-C

## 2021-03-03 NOTE — PROGRESS NOTES
Teresa is a 45 year old who is being evaluated via a billable video visit.      How would you like to obtain your AVS? Mail a copy  If the video visit is dropped, the invitation should be resent by: Text to cell phone: [phone]  Will anyone else be joining your video visit? No       I have reviewed and updated the patient's allergies and medication list.    Concerns: migraines/headaches for about 2 weeks. Notes blurred vision, sensitivity to light/sound/smell and nausea. Also states she has been quite constipated and says she's been very short of breath lately.  Refills: oxycodone, morphine, xarelto, lorazepam, methocarbamol and dexamethasone.     Vitals - Patient Reported  Weight (Patient Reported): 115.7 kg (255 lb)  Height (Patient Reported): 182.9 cm (6')  BMI (Based on Pt Reported Ht/Wt): 34.58  Pain Score: Extreme Pain (9)  Pain Loc: Low Back      Tootie Oshea CMA        Video Start Time: 1:11 PM  Video-Visit Details    Type of service:  Video Visit    Video End Time:1:40 PM    Originating Location (pt. Location): Home    Distant Location (provider location):  [facility]     Platform used for Video Visit: [facility]      Oncology Visit:   Date on this visit: Mar 3, 2021      Diagnosis:  ER positive left breast cancer metastasized to bones.     Primary Physician: No Ref-Primary, Physician     History Of Present Illness:    Ms. Sanderson is a 45 year old female with a h/o tobacco abuse and DVTs with left breast cancer metastasized to bone. She presented to [place] ED with back pain on 12/5/2020. MRI of the L-spine showed an abnormal L4 lesion with associated right paraspinal mass, abnormalities in L5 and the left iliac bone were also seen. CT C/A/P showed a left breast mass, lytic lesions of T7, L4, and the pelvis, and a 3 cm lesion in the kidney (thought to be a cyst). Ultrasound of the bilateral lower extremities showed a non-occlusive thrombus in the left popliteal vein.  Mammogram and ultrasound of the bilateral breasts on 12/17/2020 showed a spiculated mass measuring at least 7.8 cm at 12-1:00 left breast extending from the nipple to 9 cm from the nipple with associated nipple retraction. This mass was biopsied, and showed IDC with surrounding DCIS, grade 3, ER+ 90%, and GA+ 75%.  HER2 was equivocal in approximately 35% of tumor cells by FISH and was negative by IHC.    Metastatic Breast Cancer Treatment:  12/23/2021 - 1/7/2021  Radiation (3000 cGy) to the lumbar spine.  1/29/21- present Ibrance 125 mg days 1-21 every 28 days, anastrozole, zoladex    Interval History:  Ms. Sanderson was seen via video visit today for metastatic breast cancer follow-up. Since we last talked a few weeks ago she has a number of issues.     She is having worsening hot flashes and is starting to have migraines with nausea and light sensitivity a few days per week start in the past 2 weeks.     She has fairly constant blurry vision. No diplopia.     Her low back is feeling worse. She needs refill of pain medication. She is having n/t of R posterior leg down to heel. Leg feels heavy.     She has some R hip pain too which is relatively new.    Feels tired and has been very inactive because of her back. She now is easily fatigued and winded when walking through the grocery store. Does better with a cart.     Has not had any fevers/chills, cough, SOB at rest, CP, or pleuritic pain. No edema.     She has been constipated. She has not been taking any bowel meds on opioids. Appetite is great. No nausea apart from with migraines.     She started Ibrance cycle on 2/26.     Past Medical/Surgical History:   Past Medical History:   Diagnosis Date     Anxiety      Depression      DVT (deep venous thrombosis) (H) 2014     Left breast mass     x approximately 4-5 months     Pulmonary emboli (H)      Pyelonephritis      Schizoaffective disorder (H)      Tobacco use      Past Surgical History:   Procedure Laterality Date      TUBAL LIGATION  1998      No Known Allergies    Current Outpatient Medications   Medication     anastrozole (ARIMIDEX) 1 MG tablet     dexamethasone (DECADRON) 4 MG tablet     Lidocaine (LIDOCARE) 4 % Patch     LORazepam (ATIVAN) 0.5 MG tablet     methocarbamol (ROBAXIN) 500 MG tablet     morphine (MS CONTIN) 15 MG CR tablet     nicotine (NICODERM CQ) 14 MG/24HR 24 hr patch     ondansetron (ZOFRAN-ODT) 4 MG ODT tab     oxyCODONE (OXY-IR) 5 MG capsule     palbociclib (IBRANCE) 125 MG tablet     pantoprazole (PROTONIX) 40 MG EC tablet     polyethylene glycol (MIRALAX) 17 GM/Dose powder     prochlorperazine (COMPAZINE) 10 MG tablet     QUEtiapine (SEROQUEL) 100 MG tablet     rivaroxaban ANTICOAGULANT (XARELTO) 20 MG TABS tablet     sertraline (ZOLOFT) 50 MG tablet     No current facility-administered medications for this visit.    '    Physical Exam:   General: Adult female in NAD.  Alert and oriented.  Eyes: No erythema or discharge   Respiratory: Breathing comfortably on room air. No wheezing or distress.   Musculoskeletal: Full ROM of the bilateral upper extremities.   Skin: No visible concerning skin rashes or lesions   Neuro: No notable tremor and dyskinetic movements.   Psych: Mood and affect appear normal.     The rest of a comprehensive physical examination is deferred due to PHE (public health emergency) video visit restrictions.     Laboratory/Imaging Studies    3/1/2021 09:39   Sodium 143   Potassium 3.7   Chloride 110 (H)   Carbon Dioxide 29   Urea Nitrogen 14   Creatinine 0.93   GFR Estimate 74   GFR Estimate If Black 86   Calcium 9.6   Anion Gap 4   Albumin 3.4   Protein Total 7.4   Bilirubin Total 0.2   Alkaline Phosphatase 97   ALT 37   AST 12   CA 27-29 61 (H)   Glucose 94   WBC 6.1   Hemoglobin 10.6 (L)   Hematocrit 33.5 (L)   Platelet Count 203   RBC Count 3.55 (L)   MCV 94   MCH 29.9   MCHC 31.6   RDW 17.4 (H)   Diff Method Automated Method   % Neutrophils 63.7   % Lymphocytes 20.7   % Monocytes  13.0   % Eosinophils 0.2   % Basophils 0.3   % Immature Granulocytes 2.1   Nucleated RBCs 0   Absolute Neutrophil 3.9   Absolute Lymphocytes 1.3   Absolute Monocytes 0.8   Absolute Eosinophils 0.0   Absolute Basophils 0.0   Abs Immature Granulocytes 0.1   Absolute Nucleated RBC 0.0   CEA 3.3 (H)      3/1/2021 09:39   WBC 6.1   Hemoglobin 10.6 (L)   Hematocrit 33.5 (L)   Platelet Count 203   RBC Count 3.55 (L)   MCV 94   MCH 29.9   MCHC 31.6   RDW 17.4 (H)   Diff Method Automated Method   % Neutrophils 63.7   % Lymphocytes 20.7   % Monocytes 13.0   % Eosinophils 0.2   % Basophils 0.3   % Immature Granulocytes 2.1   Nucleated RBCs 0   Absolute Neutrophil 3.9   Absolute Lymphocytes 1.3   Absolute Monocytes 0.8   Absolute Eosinophils 0.0   Absolute Basophils 0.0   Abs Immature Granulocytes 0.1   Absolute Nucleated RBC 0.0      1/18/2021 14:10 2/16/2021 14:24 3/1/2021 09:39   CA 27-29 84 (H) 51 (H) 61 (H)   CEA 2.6 (H) 2.2 3.3 (H)       ASSESSMENT/PLAN:   45 year old female with history of DVT with left breast invasive ductal carcinoma, ER/WA positive, HER2 negative metastasized to bones.    1.  Metastatic breast cancer:  She completed palliative radiation to the lumbar spine and started on treatment in January with Ibrance, zoladex, and anastrozole. She is tolerating well besides hot flashes and migraines. She started cycle 2 on 2/26/21 and received second zoladex on 2/16. CBC from 2 days ago shows no neutropenia.     Tumor markers have generally declined since January but I am concerned she is still having so much pain and radicular symptoms. See below.     Will plan for follow-up prior to cycle 3, week of 3/26.     2.  Bone metastases:  She is s/p XRT to the lumbar spine. She started zometa on  1/18/21 and will receive every 3 months. For pain she continues on MS Contin 15 mg BID and oxycodone PRN.   Per her insurance company, from here forward, can only receive a 7 day supply at a time.    -Refilled MS contin and  oxy.   -With increase in pain and leg symptoms, will repeat MRI of L-spine in the next week.  -Start dex 4 mg BID for now and then reassess based on MRI.     3.  DVT:  Recommend continuing Xarelto indefinitely with metastatic disease.      4.  Anxiety:  Continue Zoloft 50 mg PO daily and Seroquel 100 mg PO at bedtime.     5. Migraines/hot flashes: Will start gabapentin 300 mg q hs. This should also help radicular symptoms. Will need to monitor for CNS depression with seroquel--she says this doesn't make her tired at all and she was on TID dosing at one time.     5. Blurry vision: Could be from prior dex. Negative brain MRI in December. Recommended seeing eye doctor.     6. Opioid-induced constipation: Refilled miralax to use daily.     50 minutes spent on the date of the encounter doing chart review, review of test results, interpretation of tests, patient visit, documentation and care coordination.      Alee Yang PA-C

## 2021-03-05 ENCOUNTER — TELEPHONE (OUTPATIENT)
Dept: ONCOLOGY | Facility: CLINIC | Age: 46
End: 2021-03-05

## 2021-03-05 NOTE — TELEPHONE ENCOUNTER
PA Initiation    Medication: LORAZEPAM - PA SUBMITTED - (Key: [id number])  Insurance Company: [employer] - Phone [phone] Fax [phone]  Start Date: 3/5/2021    [facility]   Oncology Pharmacy Liaison  [email or web address]   [phone] (phone)   [phone] (fax)

## 2021-03-07 ENCOUNTER — TELEPHONE (OUTPATIENT)
Dept: ONCOLOGY | Facility: CLINIC | Age: 46
End: 2021-03-07

## 2021-03-11 ENCOUNTER — TELEPHONE (OUTPATIENT)
Dept: ONCOLOGY | Facility: CLINIC | Age: 46
End: 2021-03-11

## 2021-03-11 ENCOUNTER — HOSPITAL ENCOUNTER (OUTPATIENT)
Dept: MRI IMAGING | Facility: CLINIC | Age: 46
Discharge: HOME | End: 2021-03-11
Attending: PHYSICIAN ASSISTANT | Admitting: PHYSICIAN ASSISTANT
Payer: COMMERCIAL

## 2021-03-11 DIAGNOSIS — C50.919 METASTATIC BREAST CANCER: ICD-10-CM

## 2021-03-11 DIAGNOSIS — C79.51 SPINE METASTASIS: ICD-10-CM

## 2021-03-11 PROCEDURE — A9585 GADOBUTROL INJECTION: HCPCS | Performed by: PHYSICIAN ASSISTANT

## 2021-03-11 PROCEDURE — 255N000002 HC RX 255 OP 636: Performed by: PHYSICIAN ASSISTANT

## 2021-03-11 PROCEDURE — 72158 MRI LUMBAR SPINE W/O & W/DYE: CPT | Mod: 26 | Performed by: STUDENT IN AN ORGANIZED HEALTH CARE EDUCATION/TRAINING PROGRAM

## 2021-03-11 PROCEDURE — 72158 MRI LUMBAR SPINE W/O & W/DYE: CPT

## 2021-03-11 RX ORDER — GADOBUTROL 604.72 MG/ML
10 INJECTION INTRAVENOUS ONCE
Status: COMPLETED | OUTPATIENT
Start: 2021-03-11 | End: 2021-03-11

## 2021-03-11 RX ADMIN — GADOBUTROL 10 ML: 604.72 INJECTION INTRAVENOUS at 09:23

## 2021-03-11 NOTE — TELEPHONE ENCOUNTER
"I called Teresa to discuss her L-spine MRI done today. The sacral lesion has improved since last scan. Lesions at L3 and L2 look slightly more conspicuous however that may be due to this exam using contrast. No evidence of cord compression or worsening neural foraminal narrowing. Her tumor markers have trended down since January (checked a few weeks following XRT). I would recommend PT at this time to help strength her low back and legs since she has been so active. She is open to this. States her pain has \"been up and down\" since we talked last. Has follow-up with me again in 2 weeks.       Spent 5 minutes on the phone in discussion.     Alee Yang PA-C     Impression: Multiple enhancing bone lesions consistent with metastatic  disease. The lesion at sacral S1 is smaller compared with prior MRI  from December 2020, likely representing positive response to  treatment. However other lesions such as seen in L3 and L2 vertebral  bodies are more conspicuous in today's exam, could represent local  mild progression or better seen in today's exam due to improved MRI  quality. Stable mild spinal canal narrowing and bilateral mild neural  foraminal narrowing at L4-5. No leptomeningeal enhancement.  "

## 2021-03-15 DIAGNOSIS — C79.51 SPINE METASTASIS: ICD-10-CM

## 2021-03-15 RX ORDER — PANTOPRAZOLE SODIUM 40 MG/1
TABLET, DELAYED RELEASE ORAL
Qty: 30 TABLET | Refills: 1 | OUTPATIENT
Start: 2021-03-15

## 2021-03-15 RX ORDER — QUETIAPINE FUMARATE 100 MG/1
TABLET, FILM COATED ORAL
Qty: 30 TABLET | Refills: 1 | OUTPATIENT
Start: 2021-03-15

## 2021-03-16 ENCOUNTER — INFUSION THERAPY VISIT (OUTPATIENT)
Dept: ONCOLOGY | Facility: CLINIC | Age: 46
End: 2021-03-16
Attending: PHYSICIAN ASSISTANT
Payer: COMMERCIAL

## 2021-03-16 VITALS
BODY MASS INDEX: 34.06 KG/M2 | SYSTOLIC BLOOD PRESSURE: 121 MMHG | WEIGHT: 251.1 LBS | HEART RATE: 65 BPM | OXYGEN SATURATION: 98 % | DIASTOLIC BLOOD PRESSURE: 70 MMHG | TEMPERATURE: 98.7 F | RESPIRATION RATE: 18 BRPM

## 2021-03-16 DIAGNOSIS — Z17.0 MALIGNANT NEOPLASM OF OVERLAPPING SITES OF LEFT BREAST IN FEMALE, ESTROGEN RECEPTOR POSITIVE (H): Primary | ICD-10-CM

## 2021-03-16 DIAGNOSIS — K21.9 GASTROESOPHAGEAL REFLUX DISEASE WITHOUT ESOPHAGITIS: ICD-10-CM

## 2021-03-16 DIAGNOSIS — C79.51 SPINE METASTASIS: ICD-10-CM

## 2021-03-16 DIAGNOSIS — F41.8 MIXED ANXIETY AND DEPRESSIVE DISORDER: Primary | ICD-10-CM

## 2021-03-16 DIAGNOSIS — C50.912 CARCINOMA OF LEFT BREAST METASTATIC TO BONE (H): ICD-10-CM

## 2021-03-16 DIAGNOSIS — C79.51 CARCINOMA OF LEFT BREAST METASTATIC TO BONE (H): ICD-10-CM

## 2021-03-16 DIAGNOSIS — C50.812 MALIGNANT NEOPLASM OF OVERLAPPING SITES OF LEFT BREAST IN FEMALE, ESTROGEN RECEPTOR POSITIVE (H): Primary | ICD-10-CM

## 2021-03-16 PROCEDURE — 96402 CHEMO HORMON ANTINEOPL SQ/IM: CPT

## 2021-03-16 PROCEDURE — 250N000011 HC RX IP 250 OP 636: Performed by: INTERNAL MEDICINE

## 2021-03-16 RX ORDER — PANTOPRAZOLE SODIUM 40 MG/1
40 TABLET, DELAYED RELEASE ORAL
Qty: 30 TABLET | Refills: 1 | Status: SHIPPED | OUTPATIENT
Start: 2021-03-16 | End: 2021-05-14

## 2021-03-16 RX ORDER — QUETIAPINE FUMARATE 100 MG/1
100 TABLET, FILM COATED ORAL AT BEDTIME
Qty: 30 TABLET | Refills: 1 | Status: SHIPPED | OUTPATIENT
Start: 2021-03-16 | End: 2021-05-14

## 2021-03-16 RX ADMIN — GOSERELIN ACETATE 3.6 MG: 3.6 IMPLANT SUBCUTANEOUS at 11:38

## 2021-03-16 NOTE — PROGRESS NOTES
Infusion Nursing Note:  Teresa Sanderson presents today for C1D60 Zoladex.    Patient seen by provider today: No   present during visit today: Not Applicable.    Note: Patient endorses occasional fatigue, migraine and hot flashes, same as previous. Denies worsening symptoms. Denies nausea/vomiting nor chest and abdominal discomfort. No new concerns made. Otherwise well.   Instructed patient if symptoms persist/worsen to call triage. Verbalized understanding.     Refused any intervention for pain today.    Note: Per HAYDE Smith note from 2/15:  Zometa on  1/18/21 and will receive every 3 months    Intravenous Access:  No Intravenous access/labs at this visit.    Treatment Conditions:  Not Applicable.      Post Infusion Assessment:  Patient tolerated one Zoladex injection on left lower abdomen without incident.  Site patent and intact, free from redness, edema or discomfort.       Discharge Plan:   Patient declined prescription refills.  Discharge instructions reviewed with: Patient.  Patient and/or family verbalized understanding of discharge instructions and all questions answered.  Copy of AVS reviewed with patient and/or family.  Patient will return 3825 for next provider appointment and 4/13 for next infusion appointment.  Patient discharged in stable condition accompanied by: self.  Departure Mode: Ambulatory.    KELY REN RN

## 2021-03-16 NOTE — PATIENT INSTRUCTIONS
Contact Numbers  [facility]: [phone]    After Hours:  [phone]  Triage: [phone]    Please call the [facility] Triage line if you experience a temperature greater than or equal to 100.5, shaking chills, have uncontrolled nausea, vomiting and/or diarrhea, dizziness, shortness of breath, chest pain, bleeding, unexplained bruising, or if you have any other new/concerning symptoms, questions or concerns.     If it is after hours, weekends, or holidays, please call the main hospital  at  [phone] and ask to speak to the Oncology doctor on call.     If you are having any concerning symptoms or wish to speak to a provider before your next infusion visit, please call your care coordinator or triage to notify them so we can adequately serve you.     If you need a refill on a narcotic prescription or other medication, please call triage before your infusion appointment.         March 2021 Sunday Monday Tuesday Wednesday Thursday Friday Saturday        1    LAB PERIPHERAL   9:15 AM   (15 min.)   [facility] DRAW   [facility] 2     3    VIDEO VISIT RETURN  12:45 PM   (50 min.)   Alee Yang PA-C   [facility] 4     5     6       7     8     9     10     11    MR LUMBAR SPINE WWO   8:30 AM   (45 min.)   [facility]   [facility] 12     13       14     15     16    [facility] ONC INFUSION 30  10:30 AM   (30 min.)    ONCOLOGY INFUSION   [facility] 17     18     19     20       21     22     23     24     25    LAB PERIPHERAL  10:30 AM   (15 min.)   [facility]   [facility]    RETURN  10:45 AM   (50 min.)   Alee Yang PA-C   [facility] 26     27       28     29     30     31                                April 2021 Sunday Monday Tuesday Wednesday Thursday Friday Saturday                       1     2      3       4     5     6     7     8     9     10       11     12     13    [facility] ONC INFUSION 30  11:00 AM   (30 min.)    ONCOLOGY INFUSION   [facility] 14     15     16     17       18     19     20    LAB PERIPHERAL   9:15 AM   (15 min.)   [facility]   [facility]    RETURN   9:30 AM   (30 min.)   Jahaira Plunkett MD   [facility] 21     22     23     24       25     26     27     28     29     30                          Lab Results:  No results found for this or any previous visit (from the past 12 hour(s)).

## 2021-03-18 DIAGNOSIS — C50.812 MALIGNANT NEOPLASM OF OVERLAPPING SITES OF LEFT BREAST IN FEMALE, ESTROGEN RECEPTOR POSITIVE (H): Primary | ICD-10-CM

## 2021-03-18 DIAGNOSIS — Z17.0 MALIGNANT NEOPLASM OF OVERLAPPING SITES OF LEFT BREAST IN FEMALE, ESTROGEN RECEPTOR POSITIVE (H): Primary | ICD-10-CM

## 2021-03-18 DIAGNOSIS — C50.912 CARCINOMA OF LEFT BREAST METASTATIC TO BONE (H): ICD-10-CM

## 2021-03-18 DIAGNOSIS — C79.51 CARCINOMA OF LEFT BREAST METASTATIC TO BONE (H): ICD-10-CM

## 2021-03-18 RX ORDER — ANASTROZOLE 1 MG/1
1 TABLET ORAL DAILY
Qty: 28 TABLET | Refills: 0 | Status: SHIPPED | OUTPATIENT
Start: 2021-03-18 | End: 2021-04-15

## 2021-03-21 ENCOUNTER — APPOINTMENT (OUTPATIENT)
Dept: GENERAL RADIOLOGY | Facility: CLINIC | Age: 46
End: 2021-03-21
Attending: INTERNAL MEDICINE
Payer: COMMERCIAL

## 2021-03-21 ENCOUNTER — NURSE TRIAGE (OUTPATIENT)
Dept: NURSING | Facility: CLINIC | Age: 46
End: 2021-03-21

## 2021-03-21 ENCOUNTER — HOSPITAL ENCOUNTER (INPATIENT)
Facility: CLINIC | Age: 46
LOS: 3 days | Discharge: HOME | End: 2021-03-24
Attending: INTERNAL MEDICINE | Admitting: INTERNAL MEDICINE
Payer: COMMERCIAL

## 2021-03-21 ENCOUNTER — APPOINTMENT (OUTPATIENT)
Dept: MRI IMAGING | Facility: CLINIC | Age: 46
End: 2021-03-21
Attending: INTERNAL MEDICINE
Payer: COMMERCIAL

## 2021-03-21 DIAGNOSIS — C79.51 CARCINOMA OF LEFT BREAST METASTATIC TO BONE (H): ICD-10-CM

## 2021-03-21 DIAGNOSIS — C79.51 SPINE METASTASIS: ICD-10-CM

## 2021-03-21 DIAGNOSIS — C79.51 METASTASIS TO BONE (H): ICD-10-CM

## 2021-03-21 DIAGNOSIS — N63.0 BREAST MASS: Primary | ICD-10-CM

## 2021-03-21 DIAGNOSIS — Z17.0 MALIGNANT NEOPLASM OF OVERLAPPING SITES OF LEFT BREAST IN FEMALE, ESTROGEN RECEPTOR POSITIVE (H): ICD-10-CM

## 2021-03-21 DIAGNOSIS — M79.661 PAIN OF RIGHT LOWER LEG: ICD-10-CM

## 2021-03-21 DIAGNOSIS — C50.912 CARCINOMA OF LEFT BREAST METASTATIC TO BONE (H): ICD-10-CM

## 2021-03-21 DIAGNOSIS — C50.919 MALIGNANT NEOPLASM OF FEMALE BREAST, UNSPECIFIED ESTROGEN RECEPTOR STATUS, UNSPECIFIED LATERALITY, UNSPECIFIED SITE OF BREAST (H): ICD-10-CM

## 2021-03-21 DIAGNOSIS — Z11.52 ENCOUNTER FOR SCREENING LABORATORY TESTING FOR SEVERE ACUTE RESPIRATORY SYNDROME CORONAVIRUS 2 (SARS-COV-2): ICD-10-CM

## 2021-03-21 DIAGNOSIS — C50.812 MALIGNANT NEOPLASM OF OVERLAPPING SITES OF LEFT BREAST IN FEMALE, ESTROGEN RECEPTOR POSITIVE (H): ICD-10-CM

## 2021-03-21 LAB
ABO + RH BLD: NORMAL
ABO + RH BLD: NORMAL
ALBUMIN SERPL-MCNC: 3.2 G/DL (ref 3.4–5)
ALP SERPL-CCNC: 69 U/L (ref 40–150)
ALT SERPL W P-5'-P-CCNC: 32 U/L (ref 0–50)
ANION GAP SERPL CALCULATED.3IONS-SCNC: 4 MMOL/L (ref 3–14)
ANISOCYTOSIS BLD QL SMEAR: SLIGHT
AST SERPL W P-5'-P-CCNC: 22 U/L (ref 0–45)
BASOPHILS # BLD AUTO: 0 10E9/L (ref 0–0.2)
BASOPHILS NFR BLD AUTO: 0 %
BILIRUB SERPL-MCNC: 0.3 MG/DL (ref 0.2–1.3)
BLD GP AB SCN SERPL QL: NORMAL
BLOOD BANK CMNT PATIENT-IMP: NORMAL
BUN SERPL-MCNC: 25 MG/DL (ref 7–30)
CALCIUM SERPL-MCNC: 8.9 MG/DL (ref 8.5–10.1)
CHLORIDE SERPL-SCNC: 108 MMOL/L (ref 94–109)
CO2 SERPL-SCNC: 27 MMOL/L (ref 20–32)
CREAT SERPL-MCNC: 1.12 MG/DL (ref 0.52–1.04)
DIFFERENTIAL METHOD BLD: ABNORMAL
EOSINOPHIL # BLD AUTO: 0 10E9/L (ref 0–0.7)
EOSINOPHIL NFR BLD AUTO: 0.9 %
ERYTHROCYTE [DISTWIDTH] IN BLOOD BY AUTOMATED COUNT: 17.4 % (ref 10–15)
GFR SERPL CREATININE-BSD FRML MDRD: 59 ML/MIN/{1.73_M2}
GLUCOSE SERPL-MCNC: 106 MG/DL (ref 70–99)
HCT VFR BLD AUTO: 35.7 % (ref 35–47)
HGB BLD-MCNC: 11.6 G/DL (ref 11.7–15.7)
INR PPP: 0.98 (ref 0.86–1.14)
LABORATORY COMMENT REPORT: NORMAL
LYMPHOCYTES # BLD AUTO: 1.5 10E9/L (ref 0.8–5.3)
LYMPHOCYTES NFR BLD AUTO: 33.6 %
MCH RBC QN AUTO: 30.4 PG (ref 26.5–33)
MCHC RBC AUTO-ENTMCNC: 32.5 G/DL (ref 31.5–36.5)
MCV RBC AUTO: 94 FL (ref 78–100)
MONOCYTES # BLD AUTO: 0.2 10E9/L (ref 0–1.3)
MONOCYTES NFR BLD AUTO: 4.4 %
NEUTROPHILS # BLD AUTO: 2.7 10E9/L (ref 1.6–8.3)
NEUTROPHILS NFR BLD AUTO: 61.1 %
PLATELET # BLD AUTO: 194 10E9/L (ref 150–450)
PLATELET # BLD EST: ABNORMAL 10*3/UL
POTASSIUM SERPL-SCNC: 4.5 MMOL/L (ref 3.4–5.3)
PROT SERPL-MCNC: 7 G/DL (ref 6.8–8.8)
RBC # BLD AUTO: 3.81 10E12/L (ref 3.8–5.2)
SARS-COV-2 RNA RESP QL NAA+PROBE: NEGATIVE
SODIUM SERPL-SCNC: 139 MMOL/L (ref 133–144)
SPECIMEN EXP DATE BLD: NORMAL
SPECIMEN SOURCE: NORMAL
WBC # BLD AUTO: 4.5 10E9/L (ref 4–11)

## 2021-03-21 PROCEDURE — 86901 BLOOD TYPING SEROLOGIC RH(D): CPT | Performed by: INTERNAL MEDICINE

## 2021-03-21 PROCEDURE — 250N000011 HC RX IP 250 OP 636: Performed by: INTERNAL MEDICINE

## 2021-03-21 PROCEDURE — 85610 PROTHROMBIN TIME: CPT | Performed by: INTERNAL MEDICINE

## 2021-03-21 PROCEDURE — 99285 EMERGENCY DEPT VISIT HI MDM: CPT | Performed by: INTERNAL MEDICINE

## 2021-03-21 PROCEDURE — 73552 X-RAY EXAM OF FEMUR 2/>: CPT | Mod: 26 | Performed by: RADIOLOGY

## 2021-03-21 PROCEDURE — 86850 RBC ANTIBODY SCREEN: CPT | Performed by: INTERNAL MEDICINE

## 2021-03-21 PROCEDURE — 96376 TX/PRO/DX INJ SAME DRUG ADON: CPT

## 2021-03-21 PROCEDURE — 72170 X-RAY EXAM OF PELVIS: CPT | Mod: 26 | Performed by: RADIOLOGY

## 2021-03-21 PROCEDURE — U0003 INFECTIOUS AGENT DETECTION BY NUCLEIC ACID (DNA OR RNA); SEVERE ACUTE RESPIRATORY SYNDROME CORONAVIRUS 2 (SARS-COV-2) (CORONAVIRUS DISEASE [COVID-19]), AMPLIFIED PROBE TECHNIQUE, MAKING USE OF HIGH THROUGHPUT TECHNOLOGIES AS DESCRIBED BY CMS-2020-01-R: HCPCS | Performed by: INTERNAL MEDICINE

## 2021-03-21 PROCEDURE — 72170 X-RAY EXAM OF PELVIS: CPT

## 2021-03-21 PROCEDURE — 86900 BLOOD TYPING SEROLOGIC ABO: CPT | Performed by: INTERNAL MEDICINE

## 2021-03-21 PROCEDURE — 72100 X-RAY EXAM L-S SPINE 2/3 VWS: CPT | Mod: 26 | Performed by: RADIOLOGY

## 2021-03-21 PROCEDURE — 250N000013 HC RX MED GY IP 250 OP 250 PS 637: Performed by: INTERNAL MEDICINE

## 2021-03-21 PROCEDURE — 99207 PR CDG-MDM COMPONENT: MEETS HIGH - UP CODED: CPT | Performed by: INTERNAL MEDICINE

## 2021-03-21 PROCEDURE — 120N000002 HC R&B MED SURG/OB UMMC

## 2021-03-21 PROCEDURE — 99285 EMERGENCY DEPT VISIT HI MDM: CPT | Mod: 25

## 2021-03-21 PROCEDURE — 99223 1ST HOSP IP/OBS HIGH 75: CPT | Mod: AI | Performed by: INTERNAL MEDICINE

## 2021-03-21 PROCEDURE — 73721 MRI JNT OF LWR EXTRE W/O DYE: CPT | Mod: RT

## 2021-03-21 PROCEDURE — C9803 HOPD COVID-19 SPEC COLLECT: HCPCS

## 2021-03-21 PROCEDURE — 258N000003 HC RX IP 258 OP 636: Performed by: INTERNAL MEDICINE

## 2021-03-21 PROCEDURE — 85025 COMPLETE CBC W/AUTO DIFF WBC: CPT | Performed by: INTERNAL MEDICINE

## 2021-03-21 PROCEDURE — 96374 THER/PROPH/DIAG INJ IV PUSH: CPT

## 2021-03-21 PROCEDURE — 80053 COMPREHEN METABOLIC PANEL: CPT | Performed by: INTERNAL MEDICINE

## 2021-03-21 PROCEDURE — U0005 INFEC AGEN DETEC AMPLI PROBE: HCPCS | Performed by: INTERNAL MEDICINE

## 2021-03-21 PROCEDURE — 72100 X-RAY EXAM L-S SPINE 2/3 VWS: CPT

## 2021-03-21 PROCEDURE — 73552 X-RAY EXAM OF FEMUR 2/>: CPT | Mod: RT

## 2021-03-21 PROCEDURE — 73721 MRI JNT OF LWR EXTRE W/O DYE: CPT | Mod: 26 | Performed by: RADIOLOGY

## 2021-03-21 RX ORDER — NICOTINE 21 MG/24HR
1 PATCH, TRANSDERMAL 24 HOURS TRANSDERMAL DAILY
Status: DISCONTINUED | OUTPATIENT
Start: 2021-03-21 | End: 2021-03-21

## 2021-03-21 RX ORDER — ONDANSETRON 4 MG/1
4 TABLET, ORALLY DISINTEGRATING ORAL EVERY 6 HOURS PRN
Status: DISCONTINUED | OUTPATIENT
Start: 2021-03-21 | End: 2021-03-24 | Stop reason: HOSPADM

## 2021-03-21 RX ORDER — PROCHLORPERAZINE MALEATE 5 MG
10 TABLET ORAL EVERY 6 HOURS PRN
Status: DISCONTINUED | OUTPATIENT
Start: 2021-03-21 | End: 2021-03-24 | Stop reason: HOSPADM

## 2021-03-21 RX ORDER — GABAPENTIN 300 MG/1
300 CAPSULE ORAL AT BEDTIME
Status: DISCONTINUED | OUTPATIENT
Start: 2021-03-21 | End: 2021-03-22

## 2021-03-21 RX ORDER — OXYCODONE HYDROCHLORIDE 5 MG/1
5-10 TABLET ORAL EVERY 8 HOURS PRN
Status: DISCONTINUED | OUTPATIENT
Start: 2021-03-21 | End: 2021-03-22

## 2021-03-21 RX ORDER — NICOTINE 21 MG/24HR
1 PATCH, TRANSDERMAL 24 HOURS TRANSDERMAL DAILY
Status: DISCONTINUED | OUTPATIENT
Start: 2021-03-22 | End: 2021-03-24 | Stop reason: HOSPADM

## 2021-03-21 RX ORDER — NALOXONE HYDROCHLORIDE 0.4 MG/ML
0.4 INJECTION, SOLUTION INTRAMUSCULAR; INTRAVENOUS; SUBCUTANEOUS
Status: DISCONTINUED | OUTPATIENT
Start: 2021-03-21 | End: 2021-03-24 | Stop reason: HOSPADM

## 2021-03-21 RX ORDER — NALOXONE HYDROCHLORIDE 0.4 MG/ML
0.2 INJECTION, SOLUTION INTRAMUSCULAR; INTRAVENOUS; SUBCUTANEOUS
Status: DISCONTINUED | OUTPATIENT
Start: 2021-03-21 | End: 2021-03-24 | Stop reason: HOSPADM

## 2021-03-21 RX ORDER — LORAZEPAM 1 MG/1
1 TABLET ORAL EVERY 4 HOURS PRN
Status: DISCONTINUED | OUTPATIENT
Start: 2021-03-21 | End: 2021-03-21

## 2021-03-21 RX ORDER — HYDROMORPHONE HYDROCHLORIDE 2 MG/ML
2 INJECTION, SOLUTION INTRAMUSCULAR; INTRAVENOUS; SUBCUTANEOUS ONCE
Status: DISCONTINUED | OUTPATIENT
Start: 2021-03-21 | End: 2021-03-21

## 2021-03-21 RX ORDER — ANASTROZOLE 1 MG/1
1 TABLET ORAL DAILY
Status: DISCONTINUED | OUTPATIENT
Start: 2021-03-21 | End: 2021-03-24 | Stop reason: HOSPADM

## 2021-03-21 RX ORDER — QUETIAPINE FUMARATE 100 MG/1
100 TABLET, FILM COATED ORAL AT BEDTIME
Status: DISCONTINUED | OUTPATIENT
Start: 2021-03-21 | End: 2021-03-24 | Stop reason: HOSPADM

## 2021-03-21 RX ORDER — MORPHINE SULFATE 15 MG/1
15 TABLET, FILM COATED, EXTENDED RELEASE ORAL EVERY 12 HOURS
Status: DISCONTINUED | OUTPATIENT
Start: 2021-03-21 | End: 2021-03-24 | Stop reason: HOSPADM

## 2021-03-21 RX ORDER — POLYETHYLENE GLYCOL 3350 17 G/17G
17 POWDER, FOR SOLUTION ORAL DAILY
Status: DISCONTINUED | OUTPATIENT
Start: 2021-03-22 | End: 2021-03-24 | Stop reason: HOSPADM

## 2021-03-21 RX ORDER — PANTOPRAZOLE SODIUM 40 MG/1
40 TABLET, DELAYED RELEASE ORAL
Status: DISCONTINUED | OUTPATIENT
Start: 2021-03-22 | End: 2021-03-24 | Stop reason: HOSPADM

## 2021-03-21 RX ORDER — SODIUM CHLORIDE 9 MG/ML
INJECTION, SOLUTION INTRAVENOUS CONTINUOUS
Status: ACTIVE | OUTPATIENT
Start: 2021-03-21 | End: 2021-03-22

## 2021-03-21 RX ADMIN — SODIUM CHLORIDE: 9 INJECTION, SOLUTION INTRAVENOUS at 21:32

## 2021-03-21 RX ADMIN — OXYCODONE HYDROCHLORIDE 10 MG: 5 TABLET ORAL at 21:12

## 2021-03-21 RX ADMIN — RIVAROXABAN 20 MG: 20 TABLET, FILM COATED ORAL at 21:12

## 2021-03-21 RX ADMIN — ANASTROZOLE 1 MG: 1 TABLET ORAL at 23:50

## 2021-03-21 RX ADMIN — HYDROMORPHONE HYDROCHLORIDE 1 MG: 1 INJECTION, SOLUTION INTRAMUSCULAR; INTRAVENOUS; SUBCUTANEOUS at 19:06

## 2021-03-21 RX ADMIN — HYDROMORPHONE HYDROCHLORIDE 1 MG: 1 INJECTION, SOLUTION INTRAMUSCULAR; INTRAVENOUS; SUBCUTANEOUS at 14:49

## 2021-03-21 RX ADMIN — HYDROMORPHONE HYDROCHLORIDE 2 MG: 1 INJECTION, SOLUTION INTRAMUSCULAR; INTRAVENOUS; SUBCUTANEOUS at 16:30

## 2021-03-21 RX ADMIN — GABAPENTIN 300 MG: 300 CAPSULE ORAL at 21:12

## 2021-03-21 RX ADMIN — HYDROMORPHONE HYDROCHLORIDE 1 MG: 1 INJECTION, SOLUTION INTRAMUSCULAR; INTRAVENOUS; SUBCUTANEOUS at 17:20

## 2021-03-21 RX ADMIN — QUETIAPINE FUMARATE 100 MG: 100 TABLET ORAL at 21:12

## 2021-03-21 RX ADMIN — MORPHINE SULFATE 15 MG: 15 TABLET, EXTENDED RELEASE ORAL at 21:12

## 2021-03-21 ASSESSMENT — ENCOUNTER SYMPTOMS
NUMBNESS: 1
SLEEP DISTURBANCE: 1
HEADACHES: 0
EYE REDNESS: 0
WEAKNESS: 1
DIFFICULTY URINATING: 0
NECK STIFFNESS: 0
FATIGUE: 1
ROS GI COMMENTS: NEGATIVE FOR BOWEL INCONTINENCE.
FEVER: 0
CONFUSION: 0
ABDOMINAL PAIN: 0
COLOR CHANGE: 0
ARTHRALGIAS: 0
BACK PAIN: 1
SHORTNESS OF BREATH: 0

## 2021-03-21 ASSESSMENT — ACTIVITIES OF DAILY LIVING (ADL): ADLS_ACUITY_SCORE: 16

## 2021-03-21 ASSESSMENT — MIFFLIN-ST. JEOR: SCORE: 1882.83

## 2021-03-21 NOTE — ED NOTES
"     Emergency Department Patient Sign-out       Brief HPI:  This is a 45 year old female signed out to me by Dr. Hudson.  See initial ED Provider note for details of the presentation.       Significant Events prior to my assuming care: Planned admission for acute hip pain, pending MRI to evaluate for pathologic/insufficiency fracture.        Exam:   Patient Vitals for the past 24 hrs:   BP Temp Temp src Pulse Resp SpO2 Height   03/21/21 1730 -- -- -- -- -- 95 % --   03/21/21 1700 128/77 -- -- 57 -- 94 % --   03/21/21 1600 (!) 124/94 -- -- 61 -- 98 % --   03/21/21 1500 (!) 145/111 -- -- 65 -- 100 % --   03/21/21 1455 128/84 -- -- 68 -- 95 % --   03/21/21 1330 (!) 154/121 98.2  F (36.8  C) Oral 70 16 100 % (!) 0.152 m (6\")           ED RESULTS:   Results for orders placed or performed during the hospital encounter of 03/21/21 (from the past 24 hour(s))   XR Femur Right 2 Views     Status: None    Collection Time: 03/21/21  2:20 PM    Narrative    EXAM: XR FEMUR RT 2 VW  LOCATION: [facility]  DATE/TIME: 3/21/2021 2:06 PM    INDICATION: Right femoral pain.  COMPARISON: None.      Impression    IMPRESSION:  1.  The right femur is within normal limits. No fracture.  2.  Mild right knee degenerative arthrosis.   XR Pelvis 1/2 Views     Status: None    Collection Time: 03/21/21  2:20 PM    Narrative    EXAM: XR PELVIS 1/2 VIEW  LOCATION: [facility]  DATE/TIME: 03/21/2021, 2:07 PM    INDICATION: Pelvic pain.  COMPARISON: None.      Impression    IMPRESSION:  1.  No fracture or joint malalignment.  2.  Mild bilateral hip degenerative arthrosis.  3.  Degenerative changes in the lower lumbar spine.       Lumbar spine XR, 2-3 views     Status: None    Collection Time: 03/21/21  2:21 PM    Narrative    EXAM: XR LUMBAR SPINE 2-3 VIEWS  LOCATION: [facility]  DATE/TIME: 03/21/2021, 2:07 PM    INDICATION: Low back pain.  COMPARISON: 12/05/2020 MRI.  TECHNIQUE: CR Lumbar Spine.      " Impression    IMPRESSION: No fracture.  Normal vertebral heights. Degenerative narrowing at the L3-L4 and L5-S1 interspaces. Mild lower lumbar facet arthrosis. Normal extraspinal structures.       CBC with platelets differential     Status: Abnormal    Collection Time: 03/21/21  2:44 PM   Result Value Ref Range    WBC 4.5 4.0 - 11.0 10e9/L    RBC Count 3.81 3.8 - 5.2 10e12/L    Hemoglobin 11.6 (L) 11.7 - 15.7 g/dL    Hematocrit 35.7 35.0 - 47.0 %    MCV 94 78 - 100 fl    MCH 30.4 26.5 - 33.0 pg    MCHC 32.5 31.5 - 36.5 g/dL    RDW 17.4 (H) 10.0 - 15.0 %    Platelet Count 194 150 - 450 10e9/L    Diff Method Manual Differential     % Neutrophils 61.1 %    % Lymphocytes 33.6 %    % Monocytes 4.4 %    % Eosinophils 0.9 %    % Basophils 0.0 %    Absolute Neutrophil 2.7 1.6 - 8.3 10e9/L    Absolute Lymphocytes 1.5 0.8 - 5.3 10e9/L    Absolute Monocytes 0.2 0.0 - 1.3 10e9/L    Absolute Eosinophils 0.0 0.0 - 0.7 10e9/L    Absolute Basophils 0.0 0.0 - 0.2 10e9/L    Anisocytosis Slight     Platelet Estimate Confirming automated cell count    INR     Status: None    Collection Time: 03/21/21  2:44 PM   Result Value Ref Range    INR 0.98 0.86 - 1.14   Comprehensive metabolic panel     Status: Abnormal (In process)    Collection Time: 03/21/21  2:44 PM   Result Value Ref Range    Sodium 139 133 - 144 mmol/L    Potassium PENDING 3.4 - 5.3 mmol/L    Chloride 108 94 - 109 mmol/L    Carbon Dioxide 27 20 - 32 mmol/L    Anion Gap PENDING 3 - 14 mmol/L    Glucose 106 (H) 70 - 99 mg/dL    Urea Nitrogen 25 7 - 30 mg/dL    Creatinine 1.12 (H) 0.52 - 1.04 mg/dL    GFR Estimate 59 (L) >60 mL/min/[1.73_m2]    GFR Estimate If Black 68 >60 mL/min/[1.73_m2]    Calcium 8.9 8.5 - 10.1 mg/dL    Bilirubin Total 0.3 0.2 - 1.3 mg/dL    Albumin 3.2 (L) 3.4 - 5.0 g/dL    Protein Total 7.0 6.8 - 8.8 g/dL    Alkaline Phosphatase 69 40 - 150 U/L    ALT 32 0 - 50 U/L    AST PENDING 0 - 45 U/L   ABO/Rh type and screen     Status: None    Collection Time:  03/21/21  2:44 PM   Result Value Ref Range    ABO AB     RH(D) Pos     Antibody Screen Neg     Test Valid Only At          [facility]    Specimen Expires 03/24/2021    Asymptomatic SARS-CoV-2 COVID-19 Virus (Coronavirus) by PCR     Status: None    Collection Time: 03/21/21  5:27 PM    Specimen: Nasopharyngeal   Result Value Ref Range    SARS-CoV-2 Virus Specimen Source Nasopharyngeal     SARS-CoV-2 PCR Result NEGATIVE     SARS-CoV-2 PCR Comment       Testing was performed using the Xpert Xpress SARS-CoV-2 Assay on the Cepheid Gene-Xpert   Instrument Systems. Additional information about this Emergency Use Authorization (EUA)   assay can be found via the Lab Guide.         ED MEDICATIONS:   Medications   HYDROmorphone (DILAUDID) injection 1 mg (1 mg Intravenous Given 3/21/21 1449)   HYDROmorphone (DILAUDID) injection 2 mg (2 mg Intravenous Given 3/21/21 1630)   HYDROmorphone (DILAUDID) injection 1 mg (1 mg Intravenous Given 3/21/21 1720)         Impression:    ICD-10-CM    1. Pain of right lower leg  M79.661 Asymptomatic SARS-CoV-2 COVID-19 Virus (Coronavirus) by PCR   2. Metastasis to bone (H)  C79.51    3. Malignant neoplasm of female breast, unspecified estrogen receptor status, unspecified laterality, unspecified site of breast (H)  C50.919        Plan:    Pending studies include MRI Right Hip.      MRI Right Hip (preliminary interpretation):  IMPRESSION:  1.  No evidence for proximal right femur fracture, femoral neck stress fracture or classic femoral head AVN.  2.  Mild right hip chondrosis with probably a small anterior labral tear.  3.  Mild right greater trochanteric bursitis/distal gluteal peritendinitis.     Disposition:  Admit to inpatient medicine service for ongoing evaluation and pain management      MD Miguel Perez William A, MD  03/21/21 1943

## 2021-03-21 NOTE — TELEPHONE ENCOUNTER
"Triage Call:    Patient calling as she is having pain in her back and legs.  Yesterday she reports \"a pop\" in her bone in her right leg  Her cousin heard the pop from across the room when it happened.  She reports that she is weak on that side in the leg  Feels that \"There is a knot is inside leg\".  Rates pain 20/10.  Pt was advised of protocol recommendation/disposition of To go into the ED.  She does have family with her who can take her in.       Macarena Linda RN on 3/21/2021 at 11:01 AM        COVID 19 Nurse Triage Plan/Patient Instructions    Please be aware that novel coronavirus (COVID-19) may be circulating in the community. If you develop symptoms such as fever, cough, or SOB or if you have concerns about the presence of another infection including coronavirus (COVID-19), please contact your health care provider or visit [email or web address].     Disposition/Instructions    ED Visit recommended. Follow protocol based instructions.     Bring Your Own Device:  Please also bring your smart device(s) (smart phones, tablets, laptops) and their charging cables for your personal use and to communicate with your care team during your visit.    Thank you for taking steps to prevent the spread of this virus.  o Limit your contact with others.  o Wear a simple mask to cover your cough.  o Wash your hands well and often.    Resources    [facility]: About COVID-19: [email or web address]    CDC: What to Do If You're Sick: www.cdc.gov/coronavirus/2019-ncov/about/steps-when-sick.html    CDC: Ending Home Isolation: www.cdc.gov/coronavirus/2019-ncov/hcp/disposition-in-home-patients.html     CDC: Caring for Someone: www.cdc.gov/coronavirus/2019-ncov/if-you-are-sick/care-for-someone.html     [facility]: Interim Guidance for Hospital Discharge to Home: www.health.[facility].[place]/diseases/coronavirus/hcp/hospdischarge.pdf    [facility] clinical trials (COVID-19 research studies): " clinicalaffairs.[facility]-clinical-trials     Below are the COVID-19 hotlines at the [place] Department of Health ([facility]). Interpreters are available.   o For health questions: Call 784-971-9506 or 1-768.763.8978 (7 a.m. to 7 p.m.)  o For questions about schools and childcare: Call 911-856-9950 or 1-919.104.9892 (7 a.m. to 7 p.m.)                   Reason for Disposition    Patient sounds very sick or weak to the triager    Additional Information    Negative: Looks like a broken bone or dislocated joint (e.g., crooked or deformed)    Negative: Sounds like a life-threatening emergency to the triager    Negative: Followed a leg injury    Negative: Leg swelling is main symptom    Negative: Back pain radiating (shooting) into leg(s)    Negative: Knee pain is main symptom    Negative: Ankle pain is main symptom    Negative: Pregnant    Negative: Postpartum (from 0 to 6 weeks after delivery)    Negative: Chest pain    Negative: Difficulty breathing    Negative: Entire foot is cool or blue in comparison to other side    Negative: Unable to walk    Negative: [1] Red area or streak AND [2] fever    Negative: [1] Swollen joint AND [2] fever    Negative: [1] Cast on leg or ankle AND [2] now increased pain    Protocols used: LEG PAIN-A-AH

## 2021-03-21 NOTE — ED PROVIDER NOTES
ED Provider Note  [facility]      History     Chief Complaint   Patient presents with     Leg Pain     The history is provided by the patient and medical records.     Teresa Sanderson is a 45 year old female with a past medical history significant for left breast cancer with metastasis to the spine undergoing chemotherapy and radiation, previous DVT and schizoaffective disorder who presents to the ED for evaluation of leg pain.  Yesterday, patient felt a pop in her hip with an onset of lateral right hip pain.  Her pain radiates down into her leg and is also had intermittent numbness and tingling on the lateral side of her right leg.  She is also been feeling weak in that leg.  The patient was unable to sleep very well last night.  She does not have any pain, numbness or tingling in her left leg.  She has also had lower back pain and fatigue.  Additionally, she constipation a few days ago but this is since resolved.  She was recently told that the tumors in her back are shrunk but her back pain could be related to her chemotherapy.  She reports that she has been able to walk around but does have pain.  She denies any bowel or bladder incontinence.    Past Medical History  Past Medical History:   Diagnosis Date     Anxiety      Breast CA (H) 12/2020     Depression      DVT (deep venous thrombosis) (H) 2014     Left breast mass     x approximately 4-5 months     Pulmonary emboli (H)      Pyelonephritis      Schizoaffective disorder (H)      Tobacco use      Past Surgical History:   Procedure Laterality Date     TUBAL LIGATION  1998     anastrozole (ARIMIDEX) 1 MG tablet  dexamethasone (DECADRON) 4 MG tablet  gabapentin (NEURONTIN) 300 MG capsule  Lidocaine (LIDOCARE) 4 % Patch  LORazepam (ATIVAN) 0.5 MG tablet  morphine (MS CONTIN) 15 MG CR tablet  naloxone (NARCAN) 4 MG/0.1ML nasal spray  nicotine (NICODERM CQ) 14 MG/24HR 24 hr patch  ondansetron (ZOFRAN-ODT) 4 MG ODT tab  oxyCODONE  (OXY-IR) 5 MG capsule  palbociclib (IBRANCE) 125 MG tablet  pantoprazole (PROTONIX) 40 MG EC tablet  polyethylene glycol (MIRALAX) 17 GM/Dose powder  prochlorperazine (COMPAZINE) 10 MG tablet  QUEtiapine (SEROQUEL) 100 MG tablet  rivaroxaban ANTICOAGULANT (XARELTO) 20 MG TABS tablet  sertraline (ZOLOFT) 50 MG tablet      No Known Allergies  Family History  Family History   Problem Relation Age of Onset     Lung Cancer Mother      Lung Cancer Father      Lupus Brother      Hypertension Other      Diabetes Other      Social History   Social History     Tobacco Use     Smoking status: Current Every Day Smoker     Packs/day: 0.25     Types: Cigarettes     Smokeless tobacco: Never Used   Substance Use Topics     Alcohol use: Not Currently     Comment: occ     Drug use: Yes     Types: Marijuana     Comment: occ      Past medical history, past surgical history, medications, allergies, family history, and social history were reviewed with the patient. No additional pertinent items.       Review of Systems   Constitutional: Positive for fatigue. Negative for fever.   HENT: Negative for congestion.    Eyes: Negative for redness.   Respiratory: Negative for shortness of breath.    Cardiovascular: Negative for chest pain.   Gastrointestinal: Negative for abdominal pain.        Negative for bowel incontinence.   Genitourinary: Negative for difficulty urinating.        Negative for urinary incontinence.   Musculoskeletal: Positive for back pain. Negative for arthralgias, gait problem and neck stiffness.        Positive for lateral right hip pain.  Positive for lateral right leg pain.   Skin: Negative for color change.   Neurological: Positive for weakness (Right leg) and numbness. Negative for headaches.        Positive for tingling.   Psychiatric/Behavioral: Positive for sleep disturbance. Negative for confusion.     A complete review of systems was performed with pertinent positives and negatives noted in the HPI, and all other  "systems negative.    Physical Exam   BP: (!) 154/121  Pulse: 70  Temp: 98.2  F (36.8  C)  Resp: 16  Height: (!) 15.2 cm (6\")  SpO2: 100 %  Physical Exam  Constitutional:       General: She is not in acute distress.     Appearance: She is not diaphoretic.   HENT:      Head: Atraumatic.      Mouth/Throat:      Pharynx: No oropharyngeal exudate.   Eyes:      General: No scleral icterus.     Pupils: Pupils are equal, round, and reactive to light.   Neck:      Musculoskeletal: Neck supple.   Cardiovascular:      Rate and Rhythm: Normal rate and regular rhythm.      Heart sounds: Normal heart sounds. No murmur. No friction rub. No gallop.    Pulmonary:      Effort: Pulmonary effort is normal. No respiratory distress.      Breath sounds: Normal breath sounds. No stridor. No wheezing, rhonchi or rales.   Chest:      Chest wall: No tenderness.   Abdominal:      General: Abdomen is flat. Bowel sounds are normal. There is no distension.      Palpations: Abdomen is soft. There is no mass.      Tenderness: There is no abdominal tenderness. There is no right CVA tenderness, left CVA tenderness, guarding or rebound.      Hernia: No hernia is present.   Musculoskeletal:      Right hip: She exhibits decreased range of motion, decreased strength and tenderness. She exhibits no bony tenderness, no swelling, no crepitus, no deformity and no laceration.        Legs:    Skin:     General: Skin is warm.      Findings: No rash.   Neurological:      General: No focal deficit present.         ED Course       1:41 PM  The patient was seen and examined by Navarro Hudson MD in Room ED10.   Procedures             Results for orders placed or performed during the hospital encounter of 03/21/21   Lumbar spine XR, 2-3 views     Status: None    Narrative    EXAM: XR LUMBAR SPINE 2-3 VIEWS  LOCATION: [facility]  DATE/TIME: 03/21/2021, 2:07 PM    INDICATION: Low back pain.  COMPARISON: 12/05/2020 MRI.  TECHNIQUE: CR Lumbar Spine.      " Impression    IMPRESSION: No fracture.  Normal vertebral heights. Degenerative narrowing at the L3-L4 and L5-S1 interspaces. Mild lower lumbar facet arthrosis. Normal extraspinal structures.       XR Pelvis 1/2 Views     Status: None    Narrative    EXAM: XR PELVIS 1/2 VIEW  LOCATION: [facility]  DATE/TIME: 03/21/2021, 2:07 PM    INDICATION: Pelvic pain.  COMPARISON: None.      Impression    IMPRESSION:  1.  No fracture or joint malalignment.  2.  Mild bilateral hip degenerative arthrosis.  3.  Degenerative changes in the lower lumbar spine.       XR Femur Right 2 Views     Status: None    Narrative    EXAM: XR FEMUR RT 2 VW  LOCATION: [facility]  DATE/TIME: 3/21/2021 2:06 PM    INDICATION: Right femoral pain.  COMPARISON: None.      Impression    IMPRESSION:  1.  The right femur is within normal limits. No fracture.  2.  Mild right knee degenerative arthrosis.   CBC with platelets differential     Status: Abnormal   Result Value Ref Range    WBC 4.5 4.0 - 11.0 10e9/L    RBC Count 3.81 3.8 - 5.2 10e12/L    Hemoglobin 11.6 (L) 11.7 - 15.7 g/dL    Hematocrit 35.7 35.0 - 47.0 %    MCV 94 78 - 100 fl    MCH 30.4 26.5 - 33.0 pg    MCHC 32.5 31.5 - 36.5 g/dL    RDW 17.4 (H) 10.0 - 15.0 %    Platelet Count 194 150 - 450 10e9/L    Diff Method Manual Differential     % Neutrophils 61.1 %    % Lymphocytes 33.6 %    % Monocytes 4.4 %    % Eosinophils 0.9 %    % Basophils 0.0 %    Absolute Neutrophil 2.7 1.6 - 8.3 10e9/L    Absolute Lymphocytes 1.5 0.8 - 5.3 10e9/L    Absolute Monocytes 0.2 0.0 - 1.3 10e9/L    Absolute Eosinophils 0.0 0.0 - 0.7 10e9/L    Absolute Basophils 0.0 0.0 - 0.2 10e9/L    Anisocytosis Slight     Platelet Estimate Confirming automated cell count    INR     Status: None   Result Value Ref Range    INR 0.98 0.86 - 1.14   Comprehensive metabolic panel     Status: Abnormal (In process)   Result Value Ref Range    Sodium 139 133 - 144 mmol/L    Potassium PENDING 3.4 - 5.3 mmol/L     "Chloride 108 94 - 109 mmol/L    Carbon Dioxide 27 20 - 32 mmol/L    Anion Gap PENDING 3 - 14 mmol/L    Glucose 106 (H) 70 - 99 mg/dL    Urea Nitrogen 25 7 - 30 mg/dL    Creatinine 1.12 (H) 0.52 - 1.04 mg/dL    GFR Estimate 59 (L) >60 mL/min/[1.73_m2]    GFR Estimate If Black 68 >60 mL/min/[1.73_m2]    Calcium 8.9 8.5 - 10.1 mg/dL    Bilirubin Total 0.3 0.2 - 1.3 mg/dL    Albumin 3.2 (L) 3.4 - 5.0 g/dL    Protein Total 7.0 6.8 - 8.8 g/dL    Alkaline Phosphatase 69 40 - 150 U/L    ALT 32 0 - 50 U/L    AST PENDING 0 - 45 U/L   ABO/Rh type and screen     Status: None   Result Value Ref Range    ABO AB     RH(D) Pos     Antibody Screen Neg     Test Valid Only At          [facility]    Specimen Expires 03/24/2021      Medications   HYDROmorphone (DILAUDID) injection 1 mg (1 mg Intravenous Given 3/21/21 1449)   HYDROmorphone (DILAUDID) injection 2 mg (2 mg Intravenous Given 3/21/21 1630)   HYDROmorphone (DILAUDID) injection 1 mg (1 mg Intravenous Given 3/21/21 1720)        Assessments & Plan (with Medical Decision Making)  Right hip back pain in the pt with breast ca with bone.spine mets but recent L spine MRI no cord compression, felt \"pop\" with excrutiating pain-dilaudid 1 mg then 2 mg iv without much improvements, keep titrating for now for intractable pain, D/W Medicine-admit to pain control, will get MRI of right hip as XR hip pelvis L spine neg for now.       I have reviewed the nursing notes. I have reviewed the findings, diagnosis, plan and need for follow up with the patient.    New Prescriptions    No medications on file       Final diagnoses:   Pain of right lower leg   Metastasis to bone (H)   Malignant neoplasm of female breast, unspecified estrogen receptor status, unspecified laterality, unspecified site of breast (H)       --  I, Shree Watts, am serving as a trained medical scribe to document services personally performed by Navarro Hudson MD, based on the " provider's statements to me.     I, Navarro Hudson MD, was physically present and have reviewed and verified the accuracy of this note documented by Shree Watts.    Navarro Hudson MD  [facility] EMERGENCY DEPARTMENT  3/21/2021     Navarro Hudson MD  03/21/21 1730

## 2021-03-21 NOTE — ED TRIAGE NOTES
"Pt pt c/o R leg pain. States she heard her hip make a \"pop\" yesterday. Pain shoots down in to leg and back. Able to bear weight. Hx of breat cancer with possible mets to bone. Takes chemo pills, last taken last night.   "

## 2021-03-21 NOTE — H&P
Heme/Onc History and Physical  Department of Internal Medicine    Patient Name: Teresa Sanderson MRN# [id number]   Age: 45 year old YOB: 1975     Date of Admission:3/21/2021    Primary care provider: No Ref-Primary, Physician  Date of Service: 3/21/2021  Admitting Team: Swing 2         Assessment and Plan:   Teresa Sanderson is a 45 year old female  with history of DVT with left breast invasive ductal carcinoma, ER/CT positive, HER2 negative metastasized to bones s/p 2 cycle of chemotherapy presented today with worsening right hip pain.       #. Metastatic breast cancer/eft breast invasive ductal carcinoma, ER/CT positive, HER2 negative   #. Left hip pain due to bone mets     She completed palliative radiation to the lumbar spine and 2 cycle of  Ibrance, zoladex, and anastrozole.    - continues on MS Contin 15 mg BID   -Hydromorphone IV 1 mg Q 3 hrs PRN   consider pain team/plauiative consult in AM    -MRI hip to eval for phagological #.   -Oncology consult. She is Scheduled  to start cycle 3 on 3/26    Chronic condition      # DVT:  PTA Xarelto indefinitely with metastatic disease.     #.   Anxiety:  PTA Zoloft 50 mg PO daily and Seroquel 100 mg PO at bedtime.    #.  Migraines/hot flashes: PTA gabapentin 300 mg q hs.     CODE: Full   Diet/IVF: Regular diet. 1l NS at 100ml/hr   DVT ppx: Xarelto   Disposition/Admission Status: Anticipate 1-2 days, may be here more than 2 midnights    Gabino Miranda MD  Hospitalist/nocturnist  [facility]    Departments of Medicine   Pager: [phone]               Chief Complaint:     Right Hip pain        HPI:   Teresa Sanderson is a 45 year old female  with history of DVT with left breast invasive ductal carcinoma, ER/CT positive, HER2 negative metastasized to bones s/p 2 cycle of chemotherapy presented today with worsening right hip pain.  Patient reports her right hip has been hurting progressively yesterday she felt like  she popped it, followed by excruciating pain which prompted her ED visit.  Reports has been having good appetite no nausea and vomiting with the chemotherapy.  No abdominal pain no chest pain no fever.  Regular bowel movements.            Past Medical History:     Past Medical History:   Diagnosis Date     Anxiety      Breast CA (H) 12/2020     Depression      DVT (deep venous thrombosis) (H) 2014     Left breast mass     x approximately 4-5 months     Pulmonary emboli (H)      Pyelonephritis      Schizoaffective disorder (H)      Tobacco use               Past Surgical History:     Past Surgical History:   Procedure Laterality Date     TUBAL LIGATION  1998              Social History:     Social History     Socioeconomic History     Marital status: Single     Spouse name: Not on file     Number of children: Not on file     Years of education: Not on file     Highest education level: Not on file   Occupational History     Not on file   Social Needs     Financial resource strain: Not on file     Food insecurity     Worry: Not on file     Inability: Not on file     Transportation needs     Medical: Not on file     Non-medical: Not on file   Tobacco Use     Smoking status: Current Every Day Smoker     Packs/day: 0.25     Types: Cigarettes     Smokeless tobacco: Never Used   Substance and Sexual Activity     Alcohol use: Not Currently     Comment: occ     Drug use: Yes     Types: Marijuana     Comment: occ     Sexual activity: Not Currently     Partners: Male   Lifestyle     Physical activity     Days per week: Not on file     Minutes per session: Not on file     Stress: Not on file   Relationships     Social connections     Talks on phone: Not on file     Gets together: Not on file     Attends Hoahaoism service: Not on file     Active member of club or organization: Not on file     Attends meetings of clubs or organizations: Not on file     Relationship status: Not on file     Intimate partner violence     Fear of  current or ex partner: Not on file     Emotionally abused: Not on file     Physically abused: Not on file     Forced sexual activity: Not on file   Other Topics Concern     Parent/sibling w/ CABG, MI or angioplasty before 65F 55M? Not Asked   Social History Narrative     Not on file            Family History:     Family History   Problem Relation Age of Onset     Lung Cancer Mother      Lung Cancer Father      Lupus Brother      Hypertension Other      Diabetes Other               Immunizations:     There is no immunization history on file for this patient.           Allergies:    No Known Allergies         Medications:     No current facility-administered medications on file prior to encounter.   anastrozole (ARIMIDEX) 1 MG tablet, Take 1 tablet (1 mg) by mouth daily for 28 days  dexamethasone (DECADRON) 4 MG tablet, Take 1 tablet (4 mg) by mouth 2 times daily (with meals) for 7 days  gabapentin (NEURONTIN) 300 MG capsule, Take 1 capsule (300 mg) by mouth At Bedtime  Lidocaine (LIDOCARE) 4 % Patch, Place 1-2 patches onto the skin every 24 hours To prevent lidocaine toxicity, patient should be patch free for 12 hrs daily.  LORazepam (ATIVAN) 0.5 MG tablet, Take 2 tablets (1 mg) by mouth every 4 hours as needed (Anxiety, Nausea/Vomiting or Sleep)  morphine (MS CONTIN) 15 MG CR tablet, Take 1 tablet (15 mg) by mouth every 12 hours  naloxone (NARCAN) 4 MG/0.1ML nasal spray, Spray 1 spray (4 mg) into one nostril alternating nostrils once as needed for opioid reversal every 2-3 minutes until assistance arrives  nicotine (NICODERM CQ) 14 MG/24HR 24 hr patch, Place 1 patch onto the skin daily  ondansetron (ZOFRAN-ODT) 4 MG ODT tab, Take 1 tablet (4 mg) by mouth every 6 hours as needed for nausea or vomiting  oxyCODONE (OXY-IR) 5 MG capsule, Take 1-2 capsules (5-10 mg) by mouth every 8 hours as needed for severe pain  palbociclib (IBRANCE) 125 MG tablet, Take 1 tablet (125 mg) by mouth daily Take for 21 days, then 7 days  "off.  pantoprazole (PROTONIX) 40 MG EC tablet, Take 1 tablet (40 mg) by mouth every morning (before breakfast)  polyethylene glycol (MIRALAX) 17 GM/Dose powder, Take 17 g by mouth daily  prochlorperazine (COMPAZINE) 10 MG tablet, Take 1 tablet (10 mg) by mouth every 6 hours as needed (Nausea/Vomiting)  QUEtiapine (SEROQUEL) 100 MG tablet, Take 1 tablet (100 mg) by mouth At Bedtime  rivaroxaban ANTICOAGULANT (XARELTO) 20 MG TABS tablet, Take 1 tablet (20 mg) by mouth daily (with dinner)  sertraline (ZOLOFT) 50 MG tablet, Take 1 tablet (50 mg) by mouth daily             Review of Systems:     A complete ROS was performed and is negative other than what is stated in the HPI.          Physical Exam:   Blood pressure 128/77, pulse 57, temperature 98.2  F (36.8  C), temperature source Oral, resp. rate 16, height (!) 0.152 m (6\"), last menstrual period 01/21/2021, SpO2 95 %, not currently breastfeeding.  General Appearance: Pleasant not in distress   HEENT: No jaundice, moist BM   Respiratory: CTAB  Cardiovascular: RRR, s1, s2 no m/g   GI: Soft, mild left lower quadrant tenderness   Genitourinary: No CVAT   Extremities : No Edema   Neurologic: A&Ox3, moves all extremities equally              Data:   Reviewed in Epic   "

## 2021-03-21 NOTE — PHARMACY-ADMISSION MEDICATION HISTORY
"  Admission Medication History Completed by Pharmacy    See [facility] Admission Navigator for allergy information, preferred outpatient pharmacy, prior to admission medications and immunization status.     Medication History Sources:     The patient    Outside pharmacy fill records ([facility])    Care Everywhere    Changes made to PTA medication list (reason):    Added: None    Deleted:   o Lorazepam 0.5mg tablet (1mg every 4 hours as needed)    Changed: None    Additional Information:    Nicotine patches- patient reports she has been out for awhile and would really like to use them as she is trying to quit smoking    Ibrance- patient states she has 2 more tablets before she is \"off\" for 7 days    Prior to Admission medications    Medication Sig Last Dose Taking? Auth Provider   anastrozole (ARIMIDEX) 1 MG tablet Take 1 tablet (1 mg) by mouth daily for 28 days 3/20/2021 at 1800 Yes Alee Yang PA-C   gabapentin (NEURONTIN) 300 MG capsule Take 1 capsule (300 mg) by mouth At Bedtime 3/20/2021 at PM Yes Alee Yang PA-C   Lidocaine (LIDOCARE) 4 % Patch Place 1-2 patches onto the skin every 24 hours To prevent lidocaine toxicity, patient should be patch free for 12 hrs daily. Past Month at Unknown time Yes Clyde Garcia MD   morphine (MS CONTIN) 15 MG CR tablet Take 1 tablet (15 mg) by mouth every 12 hours 3/20/2021 at PM Yes Alee Yang PA-C   ondansetron (ZOFRAN-ODT) 4 MG ODT tab Take 1 tablet (4 mg) by mouth every 6 hours as needed for nausea or vomiting 3/20/2021 at PM Yes Clyde Garcia MD   oxyCODONE (OXY-IR) 5 MG capsule Take 1-2 capsules (5-10 mg) by mouth every 8 hours as needed for severe pain Past Week at Unknown time Yes Alee Yang PA-C   palbociclib (IBRANCE) 125 MG tablet Take 1 tablet (125 mg) by mouth daily Take for 21 days, then 7 days off. 3/20/2021 at 1800 Yes Alee Yang PA-C   pantoprazole (PROTONIX) 40 MG EC tablet Take 1 tablet (40 mg) by mouth " every morning (before breakfast) 3/21/2021 at AM Yes Jahaira Plunkett MD   polyethylene glycol (MIRALAX) 17 GM/Dose powder Take 17 g by mouth daily 3/21/2021 at AM Yes Alee Yang PA-C   prochlorperazine (COMPAZINE) 10 MG tablet Take 1 tablet (10 mg) by mouth every 6 hours as needed (Nausea/Vomiting) Past Week at Unknown time Yes Jahaira Plunkett MD   QUEtiapine (SEROQUEL) 100 MG tablet Take 1 tablet (100 mg) by mouth At Bedtime 3/20/2021 at PM Yes Jahaira Plunkett MD   rivaroxaban ANTICOAGULANT (XARELTO) 20 MG TABS tablet Take 1 tablet (20 mg) by mouth daily (with dinner) 3/20/2021 at PM Yes Alee Yang PA-C   sertraline (ZOLOFT) 50 MG tablet Take 1 tablet (50 mg) by mouth daily 3/21/2021 at AM Yes Jahaira Plunkett MD   naloxone (NARCAN) 4 MG/0.1ML nasal spray Spray 1 spray (4 mg) into one nostril alternating nostrils once as needed for opioid reversal every 2-3 minutes until assistance arrives emergency use  Alee Yang PA-C   nicotine (NICODERM CQ) 14 MG/24HR 24 hr patch Place 1 patch onto the skin daily More than a month at Unknown time  Clyde Garcia MD       Date completed: 03/21/21    Medication history completed by: NAY Bobo

## 2021-03-22 LAB
ALBUMIN SERPL-MCNC: 2.9 G/DL (ref 3.4–5)
ALP SERPL-CCNC: 65 U/L (ref 40–150)
ALT SERPL W P-5'-P-CCNC: 28 U/L (ref 0–50)
ANION GAP SERPL CALCULATED.3IONS-SCNC: 3 MMOL/L (ref 3–14)
AST SERPL W P-5'-P-CCNC: 7 U/L (ref 0–45)
BILIRUB SERPL-MCNC: 0.3 MG/DL (ref 0.2–1.3)
BUN SERPL-MCNC: 24 MG/DL (ref 7–30)
CALCIUM SERPL-MCNC: 8.5 MG/DL (ref 8.5–10.1)
CHLORIDE SERPL-SCNC: 109 MMOL/L (ref 94–109)
CO2 SERPL-SCNC: 26 MMOL/L (ref 20–32)
CREAT SERPL-MCNC: 0.91 MG/DL (ref 0.52–1.04)
ERYTHROCYTE [DISTWIDTH] IN BLOOD BY AUTOMATED COUNT: 17.4 % (ref 10–15)
GFR SERPL CREATININE-BSD FRML MDRD: 76 ML/MIN/{1.73_M2}
GLUCOSE SERPL-MCNC: 132 MG/DL (ref 70–99)
HCT VFR BLD AUTO: 35.8 % (ref 35–47)
HGB BLD-MCNC: 11.4 G/DL (ref 11.7–15.7)
MCH RBC QN AUTO: 30.9 PG (ref 26.5–33)
MCHC RBC AUTO-ENTMCNC: 31.8 G/DL (ref 31.5–36.5)
MCV RBC AUTO: 97 FL (ref 78–100)
PLATELET # BLD AUTO: 174 10E9/L (ref 150–450)
POTASSIUM SERPL-SCNC: 4.1 MMOL/L (ref 3.4–5.3)
PROT SERPL-MCNC: 6.4 G/DL (ref 6.8–8.8)
RBC # BLD AUTO: 3.69 10E12/L (ref 3.8–5.2)
SODIUM SERPL-SCNC: 138 MMOL/L (ref 133–144)
WBC # BLD AUTO: 3.4 10E9/L (ref 4–11)

## 2021-03-22 PROCEDURE — 85027 COMPLETE CBC AUTOMATED: CPT | Performed by: INTERNAL MEDICINE

## 2021-03-22 PROCEDURE — 250N000012 HC RX MED GY IP 250 OP 636 PS 637: Performed by: INTERNAL MEDICINE

## 2021-03-22 PROCEDURE — 250N000013 HC RX MED GY IP 250 OP 250 PS 637: Performed by: INTERNAL MEDICINE

## 2021-03-22 PROCEDURE — 99232 SBSQ HOSP IP/OBS MODERATE 35: CPT | Performed by: INTERNAL MEDICINE

## 2021-03-22 PROCEDURE — 99207 PR CDG-MDM COMPONENT: MEETS MODERATE - DOWN CODED: CPT | Performed by: INTERNAL MEDICINE

## 2021-03-22 PROCEDURE — 80053 COMPREHEN METABOLIC PANEL: CPT | Performed by: INTERNAL MEDICINE

## 2021-03-22 PROCEDURE — 99222 1ST HOSP IP/OBS MODERATE 55: CPT | Performed by: PHYSICIAN ASSISTANT

## 2021-03-22 PROCEDURE — 99207 PR CONSULT E&M CHANGED TO INITIAL LEVEL: CPT | Performed by: PHYSICIAN ASSISTANT

## 2021-03-22 PROCEDURE — 120N000002 HC R&B MED SURG/OB UMMC

## 2021-03-22 PROCEDURE — 36415 COLL VENOUS BLD VENIPUNCTURE: CPT | Performed by: INTERNAL MEDICINE

## 2021-03-22 RX ORDER — LIDOCAINE 4 G/G
1 PATCH TOPICAL
Status: DISCONTINUED | OUTPATIENT
Start: 2021-03-22 | End: 2021-03-22

## 2021-03-22 RX ORDER — PREDNISONE 20 MG/1
20 TABLET ORAL DAILY
Status: DISCONTINUED | OUTPATIENT
Start: 2021-03-22 | End: 2021-03-24 | Stop reason: HOSPADM

## 2021-03-22 RX ORDER — OXYCODONE HYDROCHLORIDE 5 MG/1
5-10 TABLET ORAL EVERY 4 HOURS PRN
Status: DISCONTINUED | OUTPATIENT
Start: 2021-03-22 | End: 2021-03-22

## 2021-03-22 RX ORDER — METHOCARBAMOL 500 MG/1
500 TABLET, FILM COATED ORAL 4 TIMES DAILY
Status: DISCONTINUED | OUTPATIENT
Start: 2021-03-22 | End: 2021-03-24 | Stop reason: HOSPADM

## 2021-03-22 RX ORDER — OXYCODONE HYDROCHLORIDE 5 MG/1
5-10 TABLET ORAL
Status: DISCONTINUED | OUTPATIENT
Start: 2021-03-22 | End: 2021-03-24 | Stop reason: HOSPADM

## 2021-03-22 RX ORDER — LIDOCAINE 4 G/G
2 PATCH TOPICAL
Status: DISCONTINUED | OUTPATIENT
Start: 2021-03-23 | End: 2021-03-22

## 2021-03-22 RX ORDER — LIDOCAINE 4 G/G
2 PATCH TOPICAL
Status: DISCONTINUED | OUTPATIENT
Start: 2021-03-22 | End: 2021-03-24 | Stop reason: HOSPADM

## 2021-03-22 RX ORDER — GABAPENTIN 300 MG/1
300 CAPSULE ORAL 3 TIMES DAILY
Status: DISCONTINUED | OUTPATIENT
Start: 2021-03-22 | End: 2021-03-24 | Stop reason: HOSPADM

## 2021-03-22 RX ADMIN — METHOCARBAMOL 500 MG: 500 TABLET, FILM COATED ORAL at 15:34

## 2021-03-22 RX ADMIN — RIVAROXABAN 20 MG: 20 TABLET, FILM COATED ORAL at 18:12

## 2021-03-22 RX ADMIN — PANTOPRAZOLE SODIUM 40 MG: 40 TABLET, DELAYED RELEASE ORAL at 08:28

## 2021-03-22 RX ADMIN — OXYCODONE HYDROCHLORIDE 10 MG: 5 TABLET ORAL at 19:35

## 2021-03-22 RX ADMIN — MORPHINE SULFATE 15 MG: 15 TABLET, EXTENDED RELEASE ORAL at 19:34

## 2021-03-22 RX ADMIN — MORPHINE SULFATE 15 MG: 15 TABLET, EXTENDED RELEASE ORAL at 08:28

## 2021-03-22 RX ADMIN — POLYETHYLENE GLYCOL 3350 17 G: 17 POWDER, FOR SOLUTION ORAL at 08:28

## 2021-03-22 RX ADMIN — NICOTINE 1 PATCH: 14 PATCH, EXTENDED RELEASE TRANSDERMAL at 08:28

## 2021-03-22 RX ADMIN — ANASTROZOLE 1 MG: 1 TABLET ORAL at 18:12

## 2021-03-22 RX ADMIN — LIDOCAINE 2 PATCH: 560 PATCH PERCUTANEOUS; TOPICAL; TRANSDERMAL at 20:49

## 2021-03-22 RX ADMIN — QUETIAPINE FUMARATE 100 MG: 100 TABLET ORAL at 20:49

## 2021-03-22 RX ADMIN — METHOCARBAMOL 500 MG: 500 TABLET, FILM COATED ORAL at 11:49

## 2021-03-22 RX ADMIN — OXYCODONE HYDROCHLORIDE 10 MG: 5 TABLET ORAL at 11:49

## 2021-03-22 RX ADMIN — SERTRALINE HYDROCHLORIDE 50 MG: 50 TABLET ORAL at 08:28

## 2021-03-22 RX ADMIN — GABAPENTIN 300 MG: 300 CAPSULE ORAL at 19:34

## 2021-03-22 RX ADMIN — OXYCODONE HYDROCHLORIDE 10 MG: 5 TABLET ORAL at 08:28

## 2021-03-22 RX ADMIN — METHOCARBAMOL 500 MG: 500 TABLET, FILM COATED ORAL at 19:34

## 2021-03-22 RX ADMIN — PREDNISONE 20 MG: 20 TABLET ORAL at 15:34

## 2021-03-22 RX ADMIN — GABAPENTIN 300 MG: 300 CAPSULE ORAL at 14:05

## 2021-03-22 RX ADMIN — OXYCODONE HYDROCHLORIDE 10 MG: 5 TABLET ORAL at 15:34

## 2021-03-22 ASSESSMENT — ACTIVITIES OF DAILY LIVING (ADL)
ADLS_ACUITY_SCORE: 18

## 2021-03-22 NOTE — PLAN OF CARE
Alert & Orientated. Vitals stable. Complained of shooting pain in right leg. Pain managed with prn oxycodone, ms Contin, gabapentin and heat packs. Ambulates with SBA. Voiding but not saving. Had one bm prior to admission. PIV infusing MIVF. Patient family member will bring  palbociclib (chemo medication) from home today. Hospital Pharmacy does not carry it.       Continue with POC

## 2021-03-22 NOTE — PROGRESS NOTES
[facility]    Medicine Progress Note - Hospitalist Service, Gold 11       Date of Admission:  3/21/2021  Assessment & Plan        Teresa Sanderson is a 45 year old female  with history of DVT with left breast invasive ductal carcinoma, ER/NM positive, HER2 negative metastasized to bones s/p 2 cycle of chemotherapy presented today with worsening right hip pain.     Plan for today:  Pain team consulted   onc team consulted -given some worsening L3 and L2 vertebral bodies lesion although not a significant difference   Started on robaxin candelaria; increased gabapentin and also lidocaine patch   PT consulted for pain management from therapy stand point        #Right hip pain:  #Right trochanteric bursitis vs lumbar radiculopathy   Seems to be more consistent with non malignant causes including either trochanteric bursitis of right hip or sciatica in the setting of lumbar radiculopathy (with known disc bulge)  -conservative management with gabapentin 300 mg TID  Robaxin 500 mg 4 times daily  Prednisone dose 20 mg for 5 days   Oxycodone as needed for pain  PT consulted    Onc consulted to weigh in on if there is any concern for bony lesions as a cause of her pain     If failure of conservative management then can consider steroid injection as an outpatient       #. Metastatic breast cancer/eft breast invasive ductal carcinoma, ER/NM positive, HER2 negative   #. Left hip pain due to bone mets     She completed palliative radiation to the lumbar spine and 2 cycle of  Ibrance, zoladex, and anastrozole.     - continues on MS Contin 15 mg BID    -MRI hip with evidence of trochanteric bursitis and distal gluteal peritendinitis   -Oncology consulted. She is Scheduled  to start cycle 3 on 3/26     Chronic condition      # DVT:  PTA Xarelto indefinitely with metastatic disease.     #.   Anxiety:  PTA Zoloft 50 mg PO daily and Seroquel 100 mg PO at bedtime.    #.  Migraines/hot flashes: PTA  gabapentin 300 mg q hs.      CODE: Full   Diet/IVF: Regular diet.DVT ppx: Xarelto   Disposition/Admission Status: Anticipate 1-2 days, may be here more than 2 midnights     Diet: Regular Diet Adult    DVT Prophylaxis: {Crisostomo Catheter: not present  Code Status: Full Code       DVT: Xarelto    Disposition Plan   Expected discharge: 2 - 3 days, recommended to prior living arrangement once adequate pain management/ tolerating PO medications.  Entered: Maurice Culver MD 03/22/2021, 11:41 AM       The patient's care was discussed with the Bedside Nurse.    Maurice Culver MD  Hospitalist Service, [address]  Contact information available via [facility] Paging/Directory  Please see sign in/sign out for up to date coverage information  ______________________________________________________________________    Interval History   Patient complaining of right sided hip pain  Concern for difficulty in range of motion and numbness with spasm of right leg; weakness of right leg  Ongoing back pain; denies chest pain, shortness of breath, abdominal pain or diarrhea         Data reviewed today: I reviewed all medications, new labs and imaging results over the last 24 hours. I personally reviewed MRI hip - that showed mild bursitis   Physical Exam   Vital Signs: Temp: 98.3  F (36.8  C) Temp src: Oral BP: 107/59 Pulse: 61   Resp: 18 SpO2: 98 % O2 Device: None (Room air)    Weight: 248 lbs 3.2 oz  General Appearance: Patient in no apparent distress   Respiratory: bilateral equal breath sounds with no added sounds   Cardiovascular: s1s2 with no murmur  GI: soft, non tender, bowel sounds noted   Skin: no rash  Other:   Right hip pain- positive SLRT and limited range of motion on right side     Data   Recent Labs   Lab 03/22/21  0735 03/21/21  1444   WBC 3.4* 4.5   HGB 11.4* 11.6*   MCV 97 94    194   INR  --  0.98    139   POTASSIUM 4.1 4.5   CHLORIDE 109 108   CO2 26 27    BUN 24 25   CR 0.91 1.12*   ANIONGAP 3 4   NANDO 8.5 8.9   * 106*   ALBUMIN 2.9* 3.2*   PROTTOTAL 6.4* 7.0   BILITOTAL 0.3 0.3   ALKPHOS 65 69   ALT 28 32   AST 7 22

## 2021-03-22 NOTE — PROGRESS NOTES
SPIRITUAL HEALTH SERVICES  SPIRITUAL ASSESSMENT Progress Note  [facility] ([place]) 7C     REFERRAL SOURCE: Pt request at admission    Attempted 2x to visit, pt in cares or on the phone.     PLAN: Will communicate attempted care with Unit Chaplain Simmons. Spiritual health services remains available for any follow-up or requests    Yany Russell  Chaplain Resident  Pager: [phone]

## 2021-03-22 NOTE — CONSULTS
Oncology  Consult Note   Date of Service: 03/22/2021    Patient: Teresa Sanderson  MRN: [id number]  Admission Date: 3/21/2021  Hospital Day # 1  Cancer Diagnosis: Metastatic breast (IDC) ER/AR+, HER2-  Primary Outpatient Oncologist: Dimitrios  Current Treatment Plan: Ibrance, Zoladex, Anastrazole (C2D1 2/26/21)    Reason for Consult: Hip pain, concern for metastatic disease    History of Present Illness:    Teresa Sanderson is a 45 year old woman with metastatic breast cancer (IDC; spinal mets only) s/p palliative radiation to lumbar spine, now on Ibrance. Zoladex, Anastrazole, previous cancer-associated DVT (on Xarelto), who was admitted with worsening hip pain.    To review, Ms. Sanderson has a history of metastatic disease to her lumbar spine, for which she underwent radiation therapy from 12/23/20 - 1/7/21. The pain improved after radiation, and with oral narcotics and she had been doing fairly well until late February.    At that time, she been having more frequent bursts of low back/leg pain. It is burning/numbness that radiates down her leg, without janice point tenderness. She initially mentioned it to Alee in clinic on 3/3, and due to her previous spinal mets, there was concern for worsening metastatic disease. Lumbar MRI ordered and showed some perhaps more conspicuous L2/L3 lesions (equivocal for worsening mets), but some reduction in size of S1 met, and stable canal narrowing and L4/L5. Overall, there was no convincing evidence of new metastatic disease and she was referred to PT.     Her pain was mostly stable after that, but then, acutely worsened on 3/20 after bending over a hearing a pop. In particular, there was more R hip pain than low back pain. Due to the worsening pain, she presented to the ED. In the ED, MRI of her hip showed no metastatic disease, but did show bursitis and small labral tear. She was admitted for pain control, with a pain team consult.    Today, her pain is much improved.  She has one more day of Ibrance left this cycle, and then goes to her off week. Otherwise, she feels better than prior to admission.    Oncologic History (Copied and updated from outpatient notes)  She presented to [place] ED with back pain on 12/5/2020. MRI of the L-spine showed an abnormal L4 lesion with associated right paraspinal mass, abnormalities in L5 and the left iliac bone were also seen. CT C/A/P showed a left breast mass, lytic lesions of T7, L4, and the pelvis, and a 3 cm lesion in the kidney (thought to be a cyst). Ultrasound of the bilateral lower extremities showed a non-occlusive thrombus in the left popliteal vein. Mammogram and ultrasound of the bilateral breasts on 12/17/2020 showed a spiculated mass measuring at least 7.8 cm at 12-1:00 left breast extending from the nipple to 9 cm from the nipple with associated nipple retraction. This mass was biopsied, and showed IDC with surrounding DCIS, grade 3, ER+ 90%, and IN+ 75%.  HER2 was equivocal in approximately 35% of tumor cells by FISH and was negative by IHC.     Metastatic Breast Cancer Treatment:  12/23/2021 - 1/7/2021:  Radiation (3000 cGy) to the lumbar spine  1/29/21- present: Ibrance 125 mg days 1-21 every 28 days, anastrozole, zoladex      Review of Systems: Pertinent positive and negative systems described in HPI; the remainder of the 14 systems are negative    Past Medical History:  Past Medical History:   Diagnosis Date     Anxiety      Breast CA (H) 12/2020     Depression      DVT (deep venous thrombosis) (H) 2014     Left breast mass     x approximately 4-5 months     Pulmonary emboli (H)      Pyelonephritis      Schizoaffective disorder (H)      Tobacco use        Past Surgical History:  Past Surgical History:   Procedure Laterality Date     TUBAL LIGATION  1998       Social History:  Social History     Socioeconomic History     Marital status: Single     Spouse name: None     Number of children: None     Years of education: None      Highest education level: None   Occupational History     None   Social Needs     Financial resource strain: None     Food insecurity     Worry: None     Inability: None     Transportation needs     Medical: None     Non-medical: None   Tobacco Use     Smoking status: Current Every Day Smoker     Packs/day: 0.25     Types: Cigarettes     Smokeless tobacco: Never Used   Substance and Sexual Activity     Alcohol use: Not Currently     Comment: occ     Drug use: Yes     Types: Marijuana     Comment: occ     Sexual activity: Not Currently     Partners: Male   Lifestyle     Physical activity     Days per week: None     Minutes per session: None     Stress: None   Relationships     Social connections     Talks on phone: None     Gets together: None     Attends Restorationism service: None     Active member of club or organization: None     Attends meetings of clubs or organizations: None     Relationship status: None     Intimate partner violence     Fear of current or ex partner: None     Emotionally abused: None     Physically abused: None     Forced sexual activity: None   Other Topics Concern     Parent/sibling w/ CABG, MI or angioplasty before 65F 55M? Not Asked   Social History Narrative     None        Family History  Family History   Problem Relation Age of Onset     Lung Cancer Mother      Lung Cancer Father      Lupus Brother      Hypertension Other      Diabetes Other        Outpatient Medications:  No current facility-administered medications on file prior to encounter.   anastrozole (ARIMIDEX) 1 MG tablet, Take 1 tablet (1 mg) by mouth daily for 28 days  gabapentin (NEURONTIN) 300 MG capsule, Take 1 capsule (300 mg) by mouth At Bedtime  Lidocaine (LIDOCARE) 4 % Patch, Place 1-2 patches onto the skin every 24 hours To prevent lidocaine toxicity, patient should be patch free for 12 hrs daily.  morphine (MS CONTIN) 15 MG CR tablet, Take 1 tablet (15 mg) by mouth every 12 hours  ondansetron (ZOFRAN-ODT) 4 MG ODT  tab, Take 1 tablet (4 mg) by mouth every 6 hours as needed for nausea or vomiting  oxyCODONE (OXY-IR) 5 MG capsule, Take 1-2 capsules (5-10 mg) by mouth every 8 hours as needed for severe pain  palbociclib (IBRANCE) 125 MG tablet, Take 1 tablet (125 mg) by mouth daily Take for 21 days, then 7 days off.  pantoprazole (PROTONIX) 40 MG EC tablet, Take 1 tablet (40 mg) by mouth every morning (before breakfast)  polyethylene glycol (MIRALAX) 17 GM/Dose powder, Take 17 g by mouth daily  prochlorperazine (COMPAZINE) 10 MG tablet, Take 1 tablet (10 mg) by mouth every 6 hours as needed (Nausea/Vomiting)  QUEtiapine (SEROQUEL) 100 MG tablet, Take 1 tablet (100 mg) by mouth At Bedtime  rivaroxaban ANTICOAGULANT (XARELTO) 20 MG TABS tablet, Take 1 tablet (20 mg) by mouth daily (with dinner)  sertraline (ZOLOFT) 50 MG tablet, Take 1 tablet (50 mg) by mouth daily  naloxone (NARCAN) 4 MG/0.1ML nasal spray, Spray 1 spray (4 mg) into one nostril alternating nostrils once as needed for opioid reversal every 2-3 minutes until assistance arrives  nicotine (NICODERM CQ) 14 MG/24HR 24 hr patch, Place 1 patch onto the skin daily         Physical Exam:    /59 (BP Location: Left arm)   Pulse 61   Temp 98.3  F (36.8  C) (Oral)   Resp 18   Ht 1.829 m (6')   Wt 112.6 kg (248 lb 3.2 oz)   LMP 01/21/2021   SpO2 98%   BMI 33.66 kg/m    Gen: Well appearing, in NAD  HEENT: EOMI, PERRL, mmm, oropharynx clear  CV: Normal rate, regular rhythm. No m/r/g  Pulm: CTAB, no wheezing, normal work of breathing  Abd: Soft, nt/nd, no rebound/guarding  Ext: Warm and well perfused. No lower extremity edema  Skin: No rash, cyanosis or petechial lesion  Neuro: Alert and answering questions appropriately. CNII-XII grossly intact. Moving all extremities without issue or focal neurologic deficits.     Labs & Studies: I personally reviewed the following studies:  ROUTINE LABS (Last four results):  CMP  Recent Labs   Lab 03/22/21  0735 03/21/21  1444   NA  138 139   POTASSIUM 4.1 4.5   CHLORIDE 109 108   CO2 26 27   ANIONGAP 3 4   * 106*   BUN 24 25   CR 0.91 1.12*   GFRESTIMATED 76 59*   GFRESTBLACK 88 68   NANDO 8.5 8.9   PROTTOTAL 6.4* 7.0   ALBUMIN 2.9* 3.2*   BILITOTAL 0.3 0.3   ALKPHOS 65 69   AST 7 22   ALT 28 32     CBC  Recent Labs   Lab 03/22/21  0735 03/21/21  1444   WBC 3.4* 4.5   RBC 3.69* 3.81   HGB 11.4* 11.6*   HCT 35.8 35.7   MCV 97 94   MCH 30.9 30.4   MCHC 31.8 32.5   RDW 17.4* 17.4*    194     INR  Recent Labs   Lab 03/21/21  1444   INR 0.98       Assessment & Plan:   Teresa Sanderson is a 45 year old woman with metastatic breast cancer (IDC; spinal mets only) s/p palliative radiation to lumbar spine, now on Ibrance, Zoladex, Anastrazole, previous cancer-associated DVT (on Xarelto), who was admitted with worsening hip pain.    #Hip and low back pain  #Metastatic breast cancer with lumbar spinal mets  Acute on subacute nerve-type pain, primarily in the R hip and low back, with MRI showing no new osseous lesions; the significance of the increased prominence of the L2/L3 lesions is not clear. There is stable, but notable spinal canal narrowing ~the L4 lesions (which is also stable), but per the pain team, this is likely the cause of her nerve impingement. Unfortunately, since she already had XRT to this region, it is unlikely that we could do more radiation. Similarly, since it is not point pain/tenderness, and rather nerve impingement, we agree with the treatment plan laid out by the pain team. She has 1 more day of Ibrance for this cycle, so we will plan to give it to her while here; she has her supply so we will order the dose for today. She can continue her aromatase inhibitor.     Recommendations:   - Pain treatment per primary/pain teams  - No obvious indication for radiation therapy at this point  - Due for one more dose of Ibrance today (order modified to stop after today's dose)   - Start off week tomorrow  - Continue  Anastrazole    We will continue to follow this patient. Please do not hesitate to page with any questions or concerns.    Patient was seen and plan of care was discussed with attending physician Dr. Pimentel.    Karthik Nicole MD PhD  Heme/Onc/Transplant Fellow  [facility] [phone]

## 2021-03-22 NOTE — PLAN OF CARE
VSS on room air. Pain still significant; taking oxycodone, MS Contin, gabapentin, and Robaxin. Tolerating regular diet. Voiding with adequate urine output. Last bowel movement yesterday. Up with SBA.

## 2021-03-22 NOTE — PLAN OF CARE
Assumed care of patient [id number]. Alert and oriented. Calm and cooperative. VSS. Up independently in room. Patient continues to rate right leg pain 8/10 - reports decreased pain since prior to admission.   Continue with POC.

## 2021-03-22 NOTE — CONSULTS
"Inpatient Pain Management Service Consultation Note  03/22/21        ADMIT DATE: 3/21/2021 * No surgery found *   DATE OF CONSULT: March 22, 2021  Consult ordered by: Dr. Db Culver  Consult performed by:  Chayo Adorno PA-C      REASON FOR PAIN CONSULTATION:  Teresa Sanderson is a 45 year old female I am seeing in consultation for evaluation and recommendations regarding her right lower back and right hip pain with radiation to lateral right leg .        CHIEF PAIN COMPLAINT: \"right leg pain.\"    ASSESSMENT:   1. Lower back pain with radiation to right lateral leg, lumbar radicular pain likely from \"bone lesions predominantly involving the entire L4 vertebral body with extension to the bilateral pedicles\" and disc bultge. spinal canal narrowing and bilateral neural foraminal narrowing and facet arthropathy  at L4/5 as seen on L spine MRI from 3/11/21. + right leg straight leg.  States that she had this pain for sometime and has been evaluated however this pain was exacerbated on 3/20/21 when she bent over to  an object and felt a \"pop.\"  Pain has since improved. Note that she also has metastatic lesions at L2,L3, S1 and bilateral SI joints as seen on Lspine MRI from 3/11/21.  2. Right trochanteric bursitis- tender at right bursa, also right MRI of hip from 3/21/21 shows mild right greater trochanteric bursitis.  3. Left breast cancer with metastasis to spine.  Currently undergoing oral chemotherapy: 3 weeks on and 1 week off.  Completed radiation therapy.  4. Cancer associated pain of spine. On chronic opioid management. PTA, was on MS CONTIN 15mg PO BID and oxycodone 5mg, 2pills  PO up to 4x/day.  PTA pain level was 8/10 baseline.  5. H/o schizoaffective disorder.          -- Outpatient opioid requirements prior to admission: MS CONTIN 15mg PO BID and oxycodone 5mg, 2pills  PO up to 4x/day.  States that the doses have gone up as pain has increased.   reviewed.      Primary Care Provider: No " "Ref-Primary, Physician  Chronic Pain Provider: currently outpatient oncology has been managing opioids.    TREATMENT RECOMMENDATIONS/PLAN:   1. Continue with PTA dose of MS Contin 15mg PO Q 12 hours.  2. Change oxycodone to 5-10mg PO Q 3 hours PRN.  She slept well and did not use any opioids for 11 hours overnight.  Pain this morning returned to 8/10 baseline, yesterday was \"50/10\"   If the above is not adequate, consider stopping oxycodone and start Dilaudid 2-4mg PO Q 3 hours PRN.  3. Start Robaxin 500mg PO Q 6 hours PRN.  She states that she used this in the outpatient setting with benefits and without any side effects.  She stopped using when she did not have a refill.  4. Increase gabapentin to 300mg PO BID x 3 days and if tolerating well without side effects, consider increasing to 300mg PO TID.  5. Start lidocaine patch 4%, 1 patch TD Q 24 hours, 12 hours on and 12 hours off.  6. Start menthol patch 5%, 1 patch TD Q 8 hours.  7. Bowel regimen to prevent opioid induced constipation  8. Upon discharge, may refer patient to [facility] pain clinic for continued pain management:  Could consider right bursa joint injection however pain likely coming from L4 spine---may be more beneficial with an epidural steroid injection but patient is currently on Xarelto 20mg daily which will require holding at least 3 days and needs coordination to be done safely and if oncology is amenable to this.    Pain Service will Continue to follow peripherally, but will not put notes in every day.     Recommendations were discussed with medicine team  Plan was reviewed by the Pain Service staff anesthesiologist Dr. Holliday    Thank you for consulting the Inpatient Pain Management Service.   The above recommendations are to be acted upon at the primary team s discretion.    To reach us:  Mon - Friday 8 AM - 3 PM: Pager [phone] (Text Page)  After hours, weekends and holidays: Primary service should call [phone] the answering " "service for the on-call pain specialist    HISTORY OF PRESENT ILLNESS: Teresa Sanderson is a 45 year old female with \"past medical history significant for left breast cancer with metastasis to the spine undergoing chemotherapy and radiation, previous DVT and schizoaffective disorder\"  who was admitted on 3/21/21 for right leg pain.  On 3/20/21, patient felt a pop in her hip with an onset of lateral right hip pain after bending over to  an object.  Her pain radiates down into her leg and is also had intermittent numbness and tingling on the lateral side of her right leg.  She is also been feeling weak in that right leg.  The patient was unable to sleep very well last night.  She does not have any pain, numbness or tingling in her left leg.  Pain service was consulted to assist with pain.    Current analgesic use: MSContin 15mg PO Q12 hours,  oxycodone 10mg x2, Dilaudid IV while in ED, 1mg-2mg, total 5mg, last dose at 1900.    Teresa was evaluated today.  States pain is the right lower back and much greater in the right leg.  Felt pain is on the lateral right leg that alternates between numbness and \"pain.\"  Rates pain at 8/10 which is her baseline. States pain is manageable with she is resting, however the worst pain is while she is standing up for greater than 15 minutes.  She states that pain has overall improved since the exacerbation 2 days ago and the worst yesterday of \"50/10\" pain level.    I reviewed the pain medication regimen and discussed management options for her.  She is open to try adjuvants and any options that will decrease pain and keep her \"mobile.\"      REVIEW OF 10 BODY SYSTEMS: 10 point ROS of systems including Constitutional, Eyes, Respiratory, Cardiovascular, Gastroenterology, Genitourinary, Integumentary, Musculoskeletal, Psychiatric were all negative except for pertinent positives noted in my HPI.     INPATIENT MEDICATIONS PERTINENT TO PAIN CONSULT:   Medications related to Pain " Management (From now, onward)    Start     Dose/Rate Route Frequency Ordered Stop    03/22/21 0830  oxyCODONE (ROXICODONE) tablet 5-10 mg      5-10 mg Oral EVERY 4 HOURS PRN 03/22/21 0819      03/22/21 0800  polyethylene glycol (MIRALAX) Packet 17 g      17 g Oral DAILY 03/21/21 1940 03/21/21 2200  gabapentin (NEURONTIN) capsule 300 mg      300 mg Oral AT BEDTIME 03/21/21 1940 03/21/21 2000  morphine (MS CONTIN) 12 hr tablet 15 mg      15 mg Oral EVERY 12 HOURS 03/21/21 1940            CURRENT MEDICATIONS:   Current Facility-Administered Medications Ordered in Epic   Medication Dose Route Frequency Provider Last Rate Last Admin     anastrozole (ARIMIDEX) tablet 1 mg  1 mg Oral Daily Gabino Miranda MD   1 mg at 03/21/21 2350     gabapentin (NEURONTIN) capsule 300 mg  300 mg Oral At Bedtime Gabino Miranda MD   300 mg at 03/21/21 2112     Medication Instruction   Does not apply Continuous PRN Gabino Miranda MD         morphine (MS CONTIN) 12 hr tablet 15 mg  15 mg Oral Q12H Gabino Miranda MD   15 mg at 03/22/21 0828     naloxone (NARCAN) injection 0.2 mg  0.2 mg Intravenous Q2 Min PRN Gabino Miranda MD        Or     naloxone (NARCAN) injection 0.4 mg  0.4 mg Intravenous Q2 Min PRN Gabino Miranda MD        Or     naloxone (NARCAN) injection 0.2 mg  0.2 mg Intramuscular Q2 Min PRN Gabino Miranda MD        Or     naloxone (NARCAN) injection 0.4 mg  0.4 mg Intramuscular Q2 Min PRN Gabino Miranda MD         nicotine (NICODERM CQ) 14 MG/24HR 24 hr patch 1 patch  1 patch Transdermal Daily Gabino Miranda MD   1 patch at 03/22/21 0828     nicotine Patch in Place   Transdermal Q8H Gabino Miranda MD         ondansetron (ZOFRAN-ODT) ODT tab 4 mg  4 mg Oral Q6H PRN Gabino Miranda MD         oxyCODONE (ROXICODONE) tablet 5-10 mg  5-10 mg Oral Q4H PRN Palomo, Smarika, MD   10 mg at 03/22/21 0828     palbociclib (IBRANCE) tablet 125 mg  125 mg Oral Daily Gabino Miranda MD          pantoprazole (PROTONIX) EC tablet 40 mg  40 mg Oral QAM AC Gabino Miranda MD   40 mg at 03/22/21 0828     polyethylene glycol (MIRALAX) Packet 17 g  17 g Oral Daily Gabino Miranda MD   17 g at 03/22/21 0828     prochlorperazine (COMPAZINE) tablet 10 mg  10 mg Oral Q6H PRN Gabino Miranda MD         QUEtiapine (SEROquel) tablet 100 mg  100 mg Oral At Bedtime Gabino Miranda MD   100 mg at 03/21/21 2112     rivaroxaban ANTICOAGULANT (XARELTO) tablet 20 mg  20 mg Oral Daily with supper Gabino Miranda MD   20 mg at 03/21/21 2112     sertraline (ZOLOFT) tablet 50 mg  50 mg Oral Daily Gabino Miranda MD   50 mg at 03/22/21 0828     No current [facility]-ordered outpatient medications on file.       HOME/PREVIOUS MEDICATIONS:   Prior to Admission medications    Medication Sig Start Date End Date Taking? Authorizing Provider   anastrozole (ARIMIDEX) 1 MG tablet Take 1 tablet (1 mg) by mouth daily for 28 days 3/18/21 4/15/21 Yes Alee Yang PA-C   gabapentin (NEURONTIN) 300 MG capsule Take 1 capsule (300 mg) by mouth At Bedtime 3/3/21  Yes Alee Yang PA-C   Lidocaine (LIDOCARE) 4 % Patch Place 1-2 patches onto the skin every 24 hours To prevent lidocaine toxicity, patient should be patch free for 12 hrs daily. 12/10/20  Yes Clyde Garcia MD   morphine (MS CONTIN) 15 MG CR tablet Take 1 tablet (15 mg) by mouth every 12 hours 3/3/21  Yes Alee Yang PA-C   ondansetron (ZOFRAN-ODT) 4 MG ODT tab Take 1 tablet (4 mg) by mouth every 6 hours as needed for nausea or vomiting 12/10/20  Yes Clyde Garcia MD   oxyCODONE (OXY-IR) 5 MG capsule Take 1-2 capsules (5-10 mg) by mouth every 8 hours as needed for severe pain 3/3/21  Yes Alee Yang PA-C   palbociclib (IBRANCE) 125 MG tablet Take 1 tablet (125 mg) by mouth daily Take for 21 days, then 7 days off. 3/18/21  Yes Alee Yang PA-C   pantoprazole (PROTONIX) 40 MG EC tablet Take 1 tablet (40 mg) by mouth every morning  (before breakfast) 3/16/21  Yes Jahaira Plunkett MD   polyethylene glycol (MIRALAX) 17 GM/Dose powder Take 17 g by mouth daily 3/3/21  Yes Alee Yang PA-C   prochlorperazine (COMPAZINE) 10 MG tablet Take 1 tablet (10 mg) by mouth every 6 hours as needed (Nausea/Vomiting) 1/18/21  Yes Jahaira Plunkett MD   QUEtiapine (SEROQUEL) 100 MG tablet Take 1 tablet (100 mg) by mouth At Bedtime 3/16/21  Yes Jahaira Plunkett MD   rivaroxaban ANTICOAGULANT (XARELTO) 20 MG TABS tablet Take 1 tablet (20 mg) by mouth daily (with dinner) 3/3/21  Yes Alee Yang PA-C   sertraline (ZOLOFT) 50 MG tablet Take 1 tablet (50 mg) by mouth daily 3/16/21  Yes Jahaira Plunkett MD   naloxone (NARCAN) 4 MG/0.1ML nasal spray Spray 1 spray (4 mg) into one nostril alternating nostrils once as needed for opioid reversal every 2-3 minutes until assistance arrives 3/3/21   Alee Yang PA-C   nicotine (NICODERM CQ) 14 MG/24HR 24 hr patch Place 1 patch onto the skin daily 12/11/20   Clyde Garcia MD       PAST PAIN TREATMENT: opioid management    ALLERGIES:  No Known Allergies     PAST MEDICAL AND PSYCHIATRIC HISTORY:    Past Medical History:   Diagnosis Date     Anxiety      Breast CA (H) 12/2020     Depression      DVT (deep venous thrombosis) (H) 2014     Left breast mass     x approximately 4-5 months     Pulmonary emboli (H)      Pyelonephritis      Schizoaffective disorder (H)      Tobacco use        PAST SURGICAL HISTORY:   Past Surgical History:   Procedure Laterality Date     TUBAL LIGATION  1998       FAMILY HISTORY: family history includes Diabetes in an other family member; Hypertension in an other family member; Lung Cancer in her father and mother; Lupus in her brother.    HEALTH & LIFESTYLE PRACTICES:   Tobacco:  reports that she has been smoking cigarettes. She has been smoking about 0.25 packs per day. She has never used smokeless tobacco.  Alcohol:   reports previous alcohol use.  Illicit drugs:  reports current drug use. Drug: Marijuana.    SOCIAL HISTORY NARRATIVE:   Social History     Socioeconomic History     Marital status: Single     Spouse name: Not on file     Number of children: Not on file     Years of education: Not on file     Highest education level: Not on file   Occupational History     Not on file   Social Needs     Financial resource strain: Not on file     Food insecurity     Worry: Not on file     Inability: Not on file     Transportation needs     Medical: Not on file     Non-medical: Not on file   Tobacco Use     Smoking status: Current Every Day Smoker     Packs/day: 0.25     Types: Cigarettes     Smokeless tobacco: Never Used   Substance and Sexual Activity     Alcohol use: Not Currently     Comment: occ     Drug use: Yes     Types: Marijuana     Comment: occ     Sexual activity: Not Currently     Partners: Male   Lifestyle     Physical activity     Days per week: Not on file     Minutes per session: Not on file     Stress: Not on file   Relationships     Social connections     Talks on phone: Not on file     Gets together: Not on file     Attends Confucianist service: Not on file     Active member of club or organization: Not on file     Attends meetings of clubs or organizations: Not on file     Relationship status: Not on file     Intimate partner violence     Fear of current or ex partner: Not on file     Emotionally abused: Not on file     Physically abused: Not on file     Forced sexual activity: Not on file   Other Topics Concern     Parent/sibling w/ CABG, MI or angioplasty before 65F 55M? Not Asked   Social History Narrative     Not on file       LABORATORY VALUES:   Last Basic Metabolic Panel:  Lab Results   Component Value Date     03/22/2021      Lab Results   Component Value Date    POTASSIUM 4.1 03/22/2021     Lab Results   Component Value Date    CHLORIDE 109 03/22/2021     Lab Results   Component Value Date    NANDO 8.5  03/22/2021     Lab Results   Component Value Date    CO2 26 03/22/2021     Lab Results   Component Value Date    BUN 24 03/22/2021     Lab Results   Component Value Date    CR 0.91 03/22/2021     Lab Results   Component Value Date     03/22/2021       CBC results:  Lab Results   Component Value Date    WBC 3.4 03/22/2021     Lab Results   Component Value Date    HGB 11.4 03/22/2021     Lab Results   Component Value Date    HCT 35.8 03/22/2021     Lab Results   Component Value Date     03/22/2021       DIAGNOSTIC TESTS:     Labs above reviewed as well as additional relevant diagnostic studies from the EPIC record.     PHYSICAL EXAMINATION:  /59 (BP Location: Left arm)   Pulse 61   Temp 98.3  F (36.8  C) (Oral)   Resp 18   Ht 1.829 m (6')   Wt 112.6 kg (248 lb 3.2 oz)   LMP 01/21/2021   SpO2 98%   BMI 33.66 kg/m       EXAM:  CONSTITUTIONAL/GENERAL APPEARANCE: AAOx3. Conversant.  HEENT: Normocephalic, EOMI, sclerae clear  RESPIRATORY:  nonlabored breathing on room air  CARDIOVASCULAR: HR within normal lmits  GI:round, soft, nontender,   MUSCULOSKELETAL/BACK/SPINE/EXTREMITIES: Moving UE and LEs.  Dorsiflexion, plantar flexion 5/5 bilaterally.  Lower back-tender to palpation at L4/5, L5,S1, Tender at right bursa, tender to lateral leg at L4 distributrion. + SLR on the right, negative on the left.  Nonpainful with lumbar spine extension, facet loading.     NEURO:  SILT to UE and LE.  SKIN/VASCULAR EXAM:  Dry and warm.  PSYCHIATRIC/BEHAVIORAL/OBSERVATIONS:  No objective signs of pain observed during our interview.   Judgment/Insight -fair   Orientation - x3   Memory -good   Mood and affect - calm, pleasant, cooperative          TIME SPENT: 60 minutes including 30 minutes face-to-face time counseling her  about her diagnosis and treatment options, and coordinating care with the primary team.  DECISION-MAKING: The level of decision-making in this case is high/complex due to the complexity of  medical problems, acute/chronic pain, opioid analgesia issues, and behavioral factors.    Chayo Adorno PA-C  March 22, 2021, 10:00 AM  Inpatient Pain Management Service

## 2021-03-22 NOTE — PROGRESS NOTES
Admitted/transferred from: ED  2 RN full   skin assessment completed by Renée Adam RN and Karol CISNEROS RN.  Skin assessment finding: skin intact, no problems   Interventions/actions: other No interventions needed      Will continue to monitor.

## 2021-03-23 ENCOUNTER — APPOINTMENT (OUTPATIENT)
Dept: PHYSICAL THERAPY | Facility: CLINIC | Age: 46
End: 2021-03-23
Attending: INTERNAL MEDICINE
Payer: COMMERCIAL

## 2021-03-23 PROCEDURE — 250N000012 HC RX MED GY IP 250 OP 636 PS 637: Performed by: INTERNAL MEDICINE

## 2021-03-23 PROCEDURE — 97116 GAIT TRAINING THERAPY: CPT | Mod: GP

## 2021-03-23 PROCEDURE — 97530 THERAPEUTIC ACTIVITIES: CPT | Mod: GP

## 2021-03-23 PROCEDURE — 120N000002 HC R&B MED SURG/OB UMMC

## 2021-03-23 PROCEDURE — 97161 PT EVAL LOW COMPLEX 20 MIN: CPT | Mod: GP

## 2021-03-23 PROCEDURE — 99232 SBSQ HOSP IP/OBS MODERATE 35: CPT | Performed by: INTERNAL MEDICINE

## 2021-03-23 PROCEDURE — 99222 1ST HOSP IP/OBS MODERATE 55: CPT | Mod: GC | Performed by: INTERNAL MEDICINE

## 2021-03-23 PROCEDURE — 250N000013 HC RX MED GY IP 250 OP 250 PS 637: Performed by: INTERNAL MEDICINE

## 2021-03-23 RX ADMIN — OXYCODONE HYDROCHLORIDE 10 MG: 5 TABLET ORAL at 04:18

## 2021-03-23 RX ADMIN — METHOCARBAMOL 500 MG: 500 TABLET, FILM COATED ORAL at 07:53

## 2021-03-23 RX ADMIN — OXYCODONE HYDROCHLORIDE 10 MG: 5 TABLET ORAL at 21:01

## 2021-03-23 RX ADMIN — MORPHINE SULFATE 15 MG: 15 TABLET, EXTENDED RELEASE ORAL at 07:53

## 2021-03-23 RX ADMIN — OXYCODONE HYDROCHLORIDE 10 MG: 5 TABLET ORAL at 18:24

## 2021-03-23 RX ADMIN — METHOCARBAMOL 500 MG: 500 TABLET, FILM COATED ORAL at 11:15

## 2021-03-23 RX ADMIN — NICOTINE 1 PATCH: 14 PATCH, EXTENDED RELEASE TRANSDERMAL at 07:56

## 2021-03-23 RX ADMIN — ANASTROZOLE 1 MG: 1 TABLET ORAL at 18:16

## 2021-03-23 RX ADMIN — METHOCARBAMOL 500 MG: 500 TABLET, FILM COATED ORAL at 19:39

## 2021-03-23 RX ADMIN — SERTRALINE HYDROCHLORIDE 50 MG: 50 TABLET ORAL at 07:53

## 2021-03-23 RX ADMIN — GABAPENTIN 300 MG: 300 CAPSULE ORAL at 14:28

## 2021-03-23 RX ADMIN — GABAPENTIN 300 MG: 300 CAPSULE ORAL at 19:39

## 2021-03-23 RX ADMIN — PANTOPRAZOLE SODIUM 40 MG: 40 TABLET, DELAYED RELEASE ORAL at 07:53

## 2021-03-23 RX ADMIN — QUETIAPINE FUMARATE 100 MG: 100 TABLET ORAL at 21:01

## 2021-03-23 RX ADMIN — POLYETHYLENE GLYCOL 3350 17 G: 17 POWDER, FOR SOLUTION ORAL at 07:56

## 2021-03-23 RX ADMIN — OXYCODONE HYDROCHLORIDE 10 MG: 5 TABLET ORAL at 07:53

## 2021-03-23 RX ADMIN — LIDOCAINE 2 PATCH: 560 PATCH PERCUTANEOUS; TOPICAL; TRANSDERMAL at 19:39

## 2021-03-23 RX ADMIN — RIVAROXABAN 20 MG: 20 TABLET, FILM COATED ORAL at 16:06

## 2021-03-23 RX ADMIN — MORPHINE SULFATE 15 MG: 15 TABLET, EXTENDED RELEASE ORAL at 19:39

## 2021-03-23 RX ADMIN — OXYCODONE HYDROCHLORIDE 10 MG: 5 TABLET ORAL at 11:15

## 2021-03-23 RX ADMIN — OXYCODONE HYDROCHLORIDE 10 MG: 5 TABLET ORAL at 14:28

## 2021-03-23 RX ADMIN — METHOCARBAMOL 500 MG: 500 TABLET, FILM COATED ORAL at 16:06

## 2021-03-23 RX ADMIN — GABAPENTIN 300 MG: 300 CAPSULE ORAL at 07:53

## 2021-03-23 RX ADMIN — OXYCODONE HYDROCHLORIDE 10 MG: 5 TABLET ORAL at 01:10

## 2021-03-23 RX ADMIN — PREDNISONE 20 MG: 20 TABLET ORAL at 07:53

## 2021-03-23 ASSESSMENT — ACTIVITIES OF DAILY LIVING (ADL)
ADLS_ACUITY_SCORE: 18
ADLS_ACUITY_SCORE: 17
ADLS_ACUITY_SCORE: 18
ADLS_ACUITY_SCORE: 18

## 2021-03-23 NOTE — PROGRESS NOTES
SPIRITUAL HEALTH SERVICES  [facility] ([place]) 7C  REFERRAL SOURCE: Pt request      Pt shared her current medical story integrated with family, bassam, mental health and life concerns.  Pt is concerned about her mental health, stating that just before coming to the hospital she wanted to die by throwing herself off a bridge because the physical pain of her cancer had becomin intolerable.  She has concerns about the safety of her dtr and grandchild.  She speaks of her bassam in God, becoming more confident that God's presence was with her as we shared. She asked for prayer.    Introduced spiritual health services, provided listening and reflective conversation that affirmed her bassam and her ability to know when she needs help. Provided prayer which brought pt calmness.     PLAN: Will follow-up with pt as she remains on the unit.     Rev. Camilla Simmons MDiv, [facility]  Staff    Pager [phone]  * [facility] remains available 24/7 for emergent requests/referrals, either by having the switchboard page the on-call  or by entering an ASAP/STAT consult in Epic (this will also page the on-call ).*

## 2021-03-23 NOTE — PLAN OF CARE
Alert, oriented, AVSS.  Up with SBA in room.  Here with R hip/leg pain described as numb, tingling, shooting and sharp. Increases with certain movements.  States it is not affecting her gait.  Pain somewhat managed with oxycodone, gabapentin, robaxin; rated 7-8/10.  Reg diet, good appetite.  Voiding, not saving.  Incontinent 1x in the middle of the night, pt states this is the first time. Unsure if she was sleeping too soundly to notice the urge to void.  Also voided on the toilet at that time.

## 2021-03-23 NOTE — PROVIDER NOTIFICATION
Notified: Dr. Robbins  Re: Patient expressed desire to speak tosee a psychologist or psychiatrist.     Orders:  called back and stated he will put in a consult

## 2021-03-23 NOTE — PROGRESS NOTES
[facility]    Medicine Progress Note - Hospitalist Service, Gold 11       Date of Admission:  3/21/2021  Assessment & Plan        Teresa Sanderson is a 45 year old female  with history of DVT with left breast invasive ductal carcinoma, ER/NY positive, HER2 negative metastasized to bones s/p 2 cycle of chemotherapy presented today with worsening right hip pain.     Plan for today:  Pain team consulted.   Emotionally Liable with depressed mood. Behavioral psychology consult.   Agree with current pain adjunctives. Gabapentin recently increased.   PT consulted         #Right hip pain:  #Right trochanteric bursitis vs lumbar radiculopathy   Seems to be more consistent with non malignant causes including either trochanteric bursitis of right hip or sciatica in the setting of lumbar radiculopathy (with known disc bulge)  -conservative management with gabapentin 300 mg TID  Robaxin 500 mg 4 times daily  Prednisone dose 20 mg for 5 days   Oxycodone as needed for pain  PT consulted    Onc consulted to weigh in on if there is any concern for bony lesions as a cause of her pain     If failure of conservative management then can consider steroid injection as an outpatient       #. Metastatic breast cancer/eft breast invasive ductal carcinoma, ER/NY positive, HER2 negative   #. Left hip pain due to bone mets   Obesity    She completed palliative radiation to the lumbar spine and 2 cycle of  Ibrance, zoladex, and anastrozole.     - continues on MS Contin 15 mg BID    -MRI hip with evidence of trochanteric bursitis and distal gluteal peritendinitis   -Oncology consulted. She is Scheduled  to start cycle 3 on 3/26     Chronic condition      # DVT:  PTA Xarelto indefinitely with metastatic disease.     #.   Anxiety:  PTA Zoloft 50 mg PO daily and Seroquel 100 mg PO at bedtime.    #.  Migraines/hot flashes: PTA gabapentin 300 mg q hs.      CODE: Full   Diet/IVF: Regular diet.DVT ppx:  "Xarelto   Disposition/Admission Status: Anticipate 1-2 days, may be here more than 2 midnights     Diet: Regular Diet Adult    DVT Prophylaxis: {Crisostomo Catheter: not present  Code Status: Full Code       DVT: Xarelto    Disposition Plan   Expected discharge: 2 - 3 days, recommended to prior living arrangement once adequate pain management/ tolerating PO medications.  Entered: Ady Robbins MD 03/23/2021, 3:45 PM       The patient's care was discussed with the Bedside Nurse.    Ady Robbins MD  Hospitalist Service, [address]  Contact information available via [facility] Paging/Directory  Please see sign in/sign out for up to date coverage information  ______________________________________________________________________    Interval History   Patient complaining of right sided hip pain  Pain has decrease to 8/10 but was at a \"50/10\" on admission  Not able to sleep with current pain meds  Numbness in right lower ext   denies chest pain, shortness of breath, abdominal pain or diarrhea   Nursing stating patient was tearful and depressed      Data reviewed today: I reviewed all medications, new labs and imaging results over the last 24 hours. I personally reviewed MRI hip - that showed mild bursitis   Physical Exam   Vital Signs: Temp: 98.3  F (36.8  C) Temp src: Oral BP: 118/66 Pulse: 90   Resp: 16 SpO2: 95 % O2 Device: None (Room air)    Weight: 248 lbs 3.2 oz  Physical Exam  Constitutional:       Appearance: Normal appearance. She is obese.   HENT:      Head: Normocephalic.      Mouth/Throat:      Mouth: Mucous membranes are moist.   Eyes:      Pupils: Pupils are equal, round, and reactive to light.   Cardiovascular:      Rate and Rhythm: Normal rate and regular rhythm.      Heart sounds: No murmur. No friction rub. No gallop.    Pulmonary:      Effort: Pulmonary effort is normal. No respiratory distress.      Breath sounds: No stridor. No wheezing.   Abdominal:     "  General: Abdomen is flat. There is no distension.      Palpations: There is no mass.      Tenderness: There is no abdominal tenderness.   Neurological:      Mental Status: She is alert and oriented to person, place, and time.      Cranial Nerves: No cranial nerve deficit.      Sensory: Sensory deficit present.   Psychiatric:         Mood and Affect: Mood normal.           Data   Recent Labs   Lab 03/22/21  0735 03/21/21  1444   WBC 3.4* 4.5   HGB 11.4* 11.6*   MCV 97 94    194   INR  --  0.98    139   POTASSIUM 4.1 4.5   CHLORIDE 109 108   CO2 26 27   BUN 24 25   CR 0.91 1.12*   ANIONGAP 3 4   NANDO 8.5 8.9   * 106*   ALBUMIN 2.9* 3.2*   PROTTOTAL 6.4* 7.0   BILITOTAL 0.3 0.3   ALKPHOS 65 69   ALT 28 32   AST 7 22

## 2021-03-23 NOTE — CONSULTS
Care Management Initial Consult    General Information  Assessment completed with: Patient(and chart review.),         Primary Care Provider verified and updated as needed: Yes(per patient but not listed on face sheet.  (TBD.)   Readmission within the last 30 days: (NO)          Communication Assessment  Patient's communication style: spoken language (English or Bilingual)    Hearing Difficulty or Deaf: no   Wear Glasses or Blind: no    Cognitive  Cognitive/Neuro/Behavioral: WDL                      Living Environment:   People in home: child(pascual), adult     Current living Arrangements: house      Able to return to prior arrangements:  Yes       Family/Social Support: Supportive family and friends.             Current Resources:   Patient receiving home care services:  None per patient.     Community Resources:    Equipment currently used at home: none  Supplies currently used at home:      Employment/Financial:  Employment Status:          Financial Concerns:   Pt states no immediate concerns.          Lifestyle & Psychosocial Needs: (Following Obtained From Flow Sheet:        Socioeconomic History     Marital status: Single     Spouse name: Not on file     Number of children: Not on file     Years of education: Not on file     Highest education level: Not on file     Tobacco Use     Smoking status: Current Every Day Smoker     Packs/day: 0.25     Types: Cigarettes     Smokeless tobacco: Never Used   Substance and Sexual Activity     Alcohol use: Not Currently     Comment: occ     Drug use: Yes     Types: Marijuana     Comment: occ     Sexual activity: Not Currently     Partners: Male       Functional Status:  Prior to admission patient needed assistance:              Mental Health Status:  Mental Health Management: (Pt requesting Psychologist-MD team will order.)    Values/Beliefs:  Spiritual, Cultural Beliefs, Congregation Practices, Values that affect care:       Not assessed.          Additional  Information:    Recommendations thus far this post operative period is for Ms. Sanderson to be discharged to home with assistance form family and to follow up with outpatient physical therapy.      Care Management Team will continue to follow for updated transition needs prn.   Patient at this time is requesting a consult for a Mental Health Provider and Primary Team updated by bedside RN.    TONY Geiger., R.N., P.H.N..  Care Coordinator     Pager   [facility]

## 2021-03-23 NOTE — PROGRESS NOTES
"7C PT Eval     03/23/21 1200   Quick Adds   Type of Visit Initial PT Evaluation   Living Environment   People in home child(pascual), adult   Current Living Arrangements house   Home Accessibility stairs to enter home   Number of Stairs, Main Entrance 2  (into back door)   Stair Railings, Main Entrance railings safe and in good condition   Transportation Anticipated car, drives self   Living Environment Comments Lives in house with all needs met on main level with daughter.    Self-Care   Usual Activity Tolerance moderate   Current Activity Tolerance fair   Regular Exercise No   Equipment Currently Used at Home none   Activity/Exercise/Self-Care Comment Patient has required assist from daughter for the past month for getting out of bed in the morning otherwise IND within home. Patient leans on cart while shopping otherwise stays within home.    Disability/Function   Hearing Difficulty or Deaf no   Wear Glasses or Blind no   Concentrating, Remembering or Making Decisions Difficulty no   Difficulty Communicating no   Difficulty Eating/Swallowing no   Walking or Climbing Stairs Difficulty yes   Mobility Management difficulty ambulating for past month due to radiating LBP   Dressing/Bathing Difficulty no   Toileting issues no   Doing Errands Independently Difficulty (such as shopping) no   Fall history within last six months no   Change in Functional Status Since Onset of Current Illness/Injury yes   General Information   Onset of Illness/Injury or Date of Surgery 03/21/21   Referring Physician Maurice Culver MD   Patient/Family Therapy Goals Statement (PT) For pain to improve   Pertinent History of Current Problem (include personal factors and/or comorbidities that impact the POC) Per EMR \"Teresa Sanderson is a 45 year old female  with history of DVT with left breast invasive ductal carcinoma, ER/PA positive, HER2 negative metastasized to bones s/p 2 cycle of chemotherapy presented today with worsening right hip pain.\" "   General Observations activity: up ad cheryl   Cognition   Orientation Status (Cognition) oriented x 4   Affect/Mental Status (Cognition) WFL   Follows Commands (Cognition) WFL   Cognitive Status Comments no overt impairments   Pain Assessment   Patient Currently in Pain Yes, see Vital Sign flowsheet  (pain reproduced with activity)   Posture    Posture Forward head position;Protracted shoulders   Posture Comments kyphotic, flat lumbar spine   Range of Motion (ROM)   ROM Quick Adds ROM WFL   Strength   Manual Muscle Testing Quick Adds Strength WFL   Strength Comments neurologic weakness   Bed Mobility   Bed Mobility supine-sit;sit-supine   Supine-Sit [place] (Bed Mobility) independent   Sit-Supine [place] (Bed Mobility) independent   Transfers   Transfers sit-stand transfer   Sit-Stand Transfer   Sit-Stand [place] (Transfers) independent   Gait/Stairs (Locomotion)   [place] Level (Gait) modified independence   Assistive Device (Gait) walker, front-wheeled   Comment (Gait/Stairs) patient with forward posture and poor activity tolernance reporting increased pain with ambulation initially   Balance   Balance Comments not impaired   Sensory Examination   Sensory Perception Comments Patient with unclear response to LE sensory testing indicating sensory grossly dimished to light tough throughout L lateral thigh.    Muscle Tone   Muscle Tone no deficits were identified   Muscle Tone Comments     Clinical Impression   Criteria for Skilled Therapeutic Intervention yes, treatment indicated   PT Diagnosis (PT) impaired functional mobility   Influenced by the following impairments posture, decreased activity tolerance, pain, impaired spinal mechanics   Functional limitations due to impairments transfers, gait, stairs   Clinical Presentation Stable/Uncomplicated   Clinical Presentation Rationale clinical judgement   Clinical Decision Making (Complexity) low complexity   Therapy Frequency (PT) 5x/week    Predicted Duration of Therapy Intervention (days/wks) 2 weeks   Planned Therapy Interventions (PT) balance training;bed mobility training;gait training;manual therapy techniques;ROM (range of motion);stair training;strengthening;stretching;transfer training   Anticipated Equipment Needs at Discharge (PT) walker, rolling   Risk & Benefits of therapy have been explained evaluation/treatment results reviewed;care plan/treatment goals reviewed;risks/benefits reviewed;current/potential barriers reviewed;participants voiced agreement with care plan;participants included;patient   Clinical Impression Comments MSK exam consistent with acetabular labral pathology (limited PROM, clicking/popping, worsened with compression, relieved with distraction) as well as lumbar discal pathology (flexion worsens, extension improves, pain pattern).    PT Discharge Planning    PT Discharge Recommendation (DC Rec) home with assist;home with outpatient physical therapy   PT Rationale for DC Rec Patient appropriate to return home with continued assist from daughter when medically stable. Patient will require brisk follow-up with OP PT to facilitate smooth transition of care.    PT Brief overview of current status  SBA for transfers, gait with FWW   Total Evaluation Time   Total Evaluation Time (Minutes) 10       Neel Boyd PT, DPT  Pager #[phone]

## 2021-03-24 ENCOUNTER — PATIENT OUTREACH (OUTPATIENT)
Dept: CARE COORDINATION | Facility: CLINIC | Age: 46
End: 2021-03-24

## 2021-03-24 ENCOUNTER — APPOINTMENT (OUTPATIENT)
Dept: PHYSICAL THERAPY | Facility: CLINIC | Age: 46
End: 2021-03-24
Payer: COMMERCIAL

## 2021-03-24 VITALS
RESPIRATION RATE: 16 BRPM | DIASTOLIC BLOOD PRESSURE: 53 MMHG | HEART RATE: 75 BPM | SYSTOLIC BLOOD PRESSURE: 108 MMHG | HEIGHT: 72 IN | WEIGHT: 249.12 LBS | OXYGEN SATURATION: 95 % | BODY MASS INDEX: 33.74 KG/M2 | TEMPERATURE: 97.9 F

## 2021-03-24 PROCEDURE — 250N000013 HC RX MED GY IP 250 OP 250 PS 637: Performed by: INTERNAL MEDICINE

## 2021-03-24 PROCEDURE — 99207 PR CDG-MDM COMPONENT: MEETS MODERATE - UP CODED: CPT | Performed by: PHYSICIAN ASSISTANT

## 2021-03-24 PROCEDURE — 250N000012 HC RX MED GY IP 250 OP 636 PS 637: Performed by: INTERNAL MEDICINE

## 2021-03-24 PROCEDURE — 97116 GAIT TRAINING THERAPY: CPT | Mod: GP

## 2021-03-24 PROCEDURE — 99232 SBSQ HOSP IP/OBS MODERATE 35: CPT | Performed by: PHYSICIAN ASSISTANT

## 2021-03-24 PROCEDURE — 99239 HOSP IP/OBS DSCHRG MGMT >30: CPT | Performed by: INTERNAL MEDICINE

## 2021-03-24 RX ORDER — METHOCARBAMOL 500 MG/1
500 TABLET, FILM COATED ORAL 4 TIMES DAILY
Qty: 120 TABLET | Refills: 0 | Status: SHIPPED | OUTPATIENT
Start: 2021-03-24 | End: 2021-03-24

## 2021-03-24 RX ORDER — MORPHINE SULFATE 15 MG/1
15 TABLET, FILM COATED, EXTENDED RELEASE ORAL EVERY 12 HOURS
Qty: 28 TABLET | Refills: 0 | Status: SHIPPED | OUTPATIENT
Start: 2021-03-24 | End: 2021-04-20

## 2021-03-24 RX ORDER — METHOCARBAMOL 500 MG/1
500 TABLET, FILM COATED ORAL 4 TIMES DAILY
Qty: 120 TABLET | Refills: 0 | Status: SHIPPED | OUTPATIENT
Start: 2021-03-24 | End: 2021-06-01

## 2021-03-24 RX ORDER — OXYCODONE HYDROCHLORIDE 5 MG/1
5-10 TABLET ORAL
Qty: 56 TABLET | Refills: 0 | Status: SHIPPED | OUTPATIENT
Start: 2021-03-24 | End: 2021-03-24

## 2021-03-24 RX ORDER — MORPHINE SULFATE 15 MG/1
15 TABLET, FILM COATED, EXTENDED RELEASE ORAL EVERY 12 HOURS
Qty: 28 TABLET | Refills: 0 | Status: SHIPPED | OUTPATIENT
Start: 2021-03-24 | End: 2021-03-24

## 2021-03-24 RX ORDER — OXYCODONE HYDROCHLORIDE 5 MG/1
5-10 TABLET ORAL
Qty: 56 TABLET | Refills: 0 | Status: SHIPPED | OUTPATIENT
Start: 2021-03-24 | End: 2021-04-20

## 2021-03-24 RX ORDER — GABAPENTIN 300 MG/1
300 CAPSULE ORAL 3 TIMES DAILY
Qty: 90 CAPSULE | Refills: 0 | Status: SHIPPED | OUTPATIENT
Start: 2021-03-24 | End: 2021-04-20

## 2021-03-24 RX ORDER — GABAPENTIN 300 MG/1
300 CAPSULE ORAL 3 TIMES DAILY
Qty: 90 CAPSULE | Refills: 0 | Status: SHIPPED | OUTPATIENT
Start: 2021-03-24 | End: 2021-03-24

## 2021-03-24 RX ADMIN — OXYCODONE HYDROCHLORIDE 10 MG: 5 TABLET ORAL at 02:57

## 2021-03-24 RX ADMIN — GABAPENTIN 300 MG: 300 CAPSULE ORAL at 08:39

## 2021-03-24 RX ADMIN — SERTRALINE HYDROCHLORIDE 50 MG: 50 TABLET ORAL at 08:39

## 2021-03-24 RX ADMIN — NICOTINE 1 PATCH: 14 PATCH, EXTENDED RELEASE TRANSDERMAL at 08:43

## 2021-03-24 RX ADMIN — PREDNISONE 20 MG: 20 TABLET ORAL at 08:39

## 2021-03-24 RX ADMIN — OXYCODONE HYDROCHLORIDE 10 MG: 5 TABLET ORAL at 09:06

## 2021-03-24 RX ADMIN — OXYCODONE HYDROCHLORIDE 10 MG: 5 TABLET ORAL at 05:59

## 2021-03-24 RX ADMIN — MORPHINE SULFATE 15 MG: 15 TABLET, EXTENDED RELEASE ORAL at 08:39

## 2021-03-24 RX ADMIN — POLYETHYLENE GLYCOL 3350 17 G: 17 POWDER, FOR SOLUTION ORAL at 08:40

## 2021-03-24 RX ADMIN — METHOCARBAMOL 500 MG: 500 TABLET, FILM COATED ORAL at 08:39

## 2021-03-24 RX ADMIN — PANTOPRAZOLE SODIUM 40 MG: 40 TABLET, DELAYED RELEASE ORAL at 08:40

## 2021-03-24 ASSESSMENT — MIFFLIN-ST. JEOR: SCORE: 1887

## 2021-03-24 ASSESSMENT — ACTIVITIES OF DAILY LIVING (ADL)
ADLS_ACUITY_SCORE: 17

## 2021-03-24 NOTE — PROGRESS NOTES
"        Hematology / Oncology  Daily Progress Note   Date of Service: 03/24/2021  Patient: Teresa Sanderson  MRN: [id number]  Admission Date: 3/21/2021  Hospital Day # 3  Cancer Diagnosis: Metastatic breast (IDC) ER/LA+, HER2-  Primary Outpatient Oncologist: Dimitrios  Current Treatment Plan: Ibrance, Zoladex, Anastrozole (C2D1 2/26/21)    Recommendations:   - Pain treatment per primary/pain teams  - No indication for radiation therapy at this point  - Completed her Ibrance cycle, now on her \"off\" week  - Continue Anastrozole  - Will follow up with ALEKSANDRA 3/25/21 with Alee Yang     Assessment & Plan:   Teresa Sanderson is a 45 year old woman with metastatic breast cancer (IDC; spinal mets only) s/p palliative radiation to lumbar spine, now on Ibrance, Zoladex, Anastrazole, previous cancer-associated DVT (on Xarelto), who was admitted with worsening hip pain.    # Hip and low back pain  # Metastatic breast cancer with lumbar spinal mets  Follows with Dr Plunkett  See details below for oncologic history and treatment history  - Acute on subacute nerve-type pain, primarily in the R hip and low back  - MRI showing no new osseous lesions; the significance of the increased prominence of the L2/L3 lesions is not clear. There is stable, but notable spinal canal narrowing ~the L4 lesions (which is also stable), but per the pain team, this is likely the cause of her nerve impingement. Unfortunately, since she already had XRT to this region, it is unlikely that we could do more radiation. Similarly, since it is not point pain/tenderness, and rather nerve impingement, we agree with the treatment plan laid out by the pain team.   - Completed her Ibrance cycle, now on her \"off\" week  - Continue Anastrozole  - Will follow up with ALEKSANDRA 3/25/21 with Alee Yang      Oncologic History (Copied and updated from outpatient notes)  She presented to [place] ED with back pain on 12/5/2020. MRI of the L-spine showed an abnormal " L4 lesion with associated right paraspinal mass, abnormalities in L5 and the left iliac bone were also seen. CT C/A/P showed a left breast mass, lytic lesions of T7, L4, and the pelvis, and a 3 cm lesion in the kidney (thought to be a cyst). Ultrasound of the bilateral lower extremities showed a non-occlusive thrombus in the left popliteal vein. Mammogram and ultrasound of the bilateral breasts on 12/17/2020 showed a spiculated mass measuring at least 7.8 cm at 12-1:00 left breast extending from the nipple to 9 cm from the nipple with associated nipple retraction. This mass was biopsied, and showed IDC with surrounding DCIS, grade 3, ER+ 90%, and NC+ 75%.  HER2 was equivocal in approximately 35% of tumor cells by FISH and was negative by IHC.     Metastatic Breast Cancer Treatment:  12/23/2021 - 1/7/2021:  Radiation (3000 cGy) to the lumbar spine  1/29/21- present: Ibrance 125 mg days 1-21 every 28 days, anastrozole, zoladex       Patient plan of care was discussed with attending physician Dr. Reyes.    Thank you for the opportunity to partake in this patients plan of care. Please do not hesitate to page with questions. We will continue to follow with you.     Lianne Sibley PA-C   Hematology/Oncology   Pager: [phone]   ___________________________________________________________________    Subjective & Interval History:    No acute events noted overnight.  Teresa's pain has greatly improved today. Relieved that her hip pain is not due to a new lesion. Has a new walker to help her move around. Pain still in her hip, though improved. Feels that the gabapentin has been helping.  Appetite intact. Energy levels good. Denies fever, SOB, cough, abdominal pain, diarrhea, constipation, nausea, vomiting, dysuria, hematuria, numbness, tingling, swelling    Complete and Comprehensive review of systems review and negative other than noted here or in the HPI.  Physical Exam:    Blood pressure 108/53, pulse 75, temperature 97.9   F (36.6  C), temperature source Oral, resp. rate 16, height 1.829 m (6'), weight 112.6 kg (248 lb 3.2 oz), last menstrual period 01/21/2021, SpO2 95 %, not currently breastfeeding.    Constitutional: Pleasant female sitting at edge of bed. Awake and conversational. Non- toxic appearing. No acute distress.   HEENT: Normocephalic, atraumatic. Sclerae anicteric. EOM intact. Moist mucus membranes  Respiratory: Breathing comfortable with no increased work on room air. Good air exchange. No signs of respiratory distress or accessory muscle use.   GI: Abdomen is soft, non-distended  Skin: Skin is clean, dry, intact. No jaundice appreciated.   Musculoskeletal/ Extremities: No redness, warmth, or swelling of the joints appreciated. Distal pulses palpable. Nailbeds pink and without cyanosis or clubbing.   Neurologic: Alert and oriented. Speech normal. Grossly nonfocal. Memory and thought process preserved.   Neuropsychiatric: Calm, affect congruent to situation. Appropriate mood and affect. Good judgment and insight. No visual/auditory hallucinations.       Labs & Studies: I personally reviewed the following studies:  ROUTINE LABS (Last four results):  CMP  Recent Labs   Lab 03/22/21  0735 03/21/21  1444    139   POTASSIUM 4.1 4.5   CHLORIDE 109 108   CO2 26 27   ANIONGAP 3 4   * 106*   BUN 24 25   CR 0.91 1.12*   GFRESTIMATED 76 59*   GFRESTBLACK 88 68   NANDO 8.5 8.9   PROTTOTAL 6.4* 7.0   ALBUMIN 2.9* 3.2*   BILITOTAL 0.3 0.3   ALKPHOS 65 69   AST 7 22   ALT 28 32     CBC  Recent Labs   Lab 03/22/21  0735 03/21/21  1444   WBC 3.4* 4.5   RBC 3.69* 3.81   HGB 11.4* 11.6*   HCT 35.8 35.7   MCV 97 94   MCH 30.9 30.4   MCHC 31.8 32.5   RDW 17.4* 17.4*    194     INR  Recent Labs   Lab 03/21/21  1444   INR 0.98       Medications list for reference:  Current Facility-Administered Medications   Medication     anastrozole (ARIMIDEX) tablet 1 mg     gabapentin (NEURONTIN) capsule 300 mg     Lidocaine (LIDOCARE) 4  % Patch 2 patch     lidocaine patch in PLACE     Medication Instruction     methocarbamol (ROBAXIN) tablet 500 mg     morphine (MS CONTIN) 12 hr tablet 15 mg     naloxone (NARCAN) injection 0.2 mg    Or     naloxone (NARCAN) injection 0.4 mg    Or     naloxone (NARCAN) injection 0.2 mg    Or     naloxone (NARCAN) injection 0.4 mg     nicotine (NICODERM CQ) 14 MG/24HR 24 hr patch 1 patch     nicotine Patch in Place     ondansetron (ZOFRAN-ODT) ODT tab 4 mg     oxyCODONE (ROXICODONE) tablet 5-10 mg     pantoprazole (PROTONIX) EC tablet 40 mg     polyethylene glycol (MIRALAX) Packet 17 g     predniSONE (DELTASONE) tablet 20 mg     prochlorperazine (COMPAZINE) tablet 10 mg     QUEtiapine (SEROquel) tablet 100 mg     rivaroxaban ANTICOAGULANT (XARELTO) tablet 20 mg     sertraline (ZOLOFT) tablet 50 mg

## 2021-03-24 NOTE — PROGRESS NOTES
"Inpatient Pain Service: Follow Up Note  03/24/21    Patient: Teresa Sanderson  Admission Date:3/21/2021    * No surgery found *       Chief Pain Endorsement: right leg pain    Recommendations were discussed medicine team, oncology team  Plan was reviewed by the Inpatient Pain Service, staff attending Dr. Holliday      1. Lower back pain with radiation to right lateral leg, lumbar radicular pain likely from \"bone lesions predominantly involving the entire L4 vertebral body with extension to the bilateral pedicles\" and disc bultge. spinal canal narrowing and bilateral neural foraminal narrowing and facet arthropathy  at L4/5 as seen on L spine MRI from 3/11/21. + right leg straight leg.  States that she had this pain for sometime and has been evaluated however this pain was exacerbated on 3/20/21 when she bent over to  an object and felt a \"pop.\"  Pain has since improved. Note that she also has metastatic lesions at L2,L3, S1 and bilateral SI joints as seen on Lspine MRI from 3/11/21.  2. Right trochanteric bursitis- tender at right bursa, also right MRI of hip from 3/21/21 shows mild right greater trochanteric bursitis.  3. Left breast cancer with metastasis to spine.  Currently undergoing oral chemotherapy: 3 weeks on and 1 week off.  Completed radiation therapy.  4. Cancer associated pain of spine. On chronic opioid management. PTA, was on MS CONTIN 15mg PO BID and oxycodone 5mg, 2pills  PO up to 4x/day.  PTA pain level was 8/10 baseline.  5. H/o schizoaffective disorder.              -- Outpatient opioid requirements prior to admission: MS CONTIN 15mg PO BID and oxycodone 5mg, 2pills  PO up to 4x/day.  States that the doses have gone up as pain has increased.   reviewed.        Primary Care Provider: No Ref-Primary, Physician  Chronic Pain Provider: currently outpatient oncology has been managing opioids.     TREATMENT RECOMMENDATIONS/PLAN:   1. Continue with PTA dose of MS Contin 15mg PO Q 12 " hours.  2. Continue oxycodone to 5-10mg PO Q 3 hours PRN. Used 10mgx 7 doses on 3/23/21.    3. Continue Robaxin 500mg PO Q 6 hours PRN.  She states that she used this in the outpatient setting with benefits and without any side effects.  She stopped using when she did not have a refill.  4. Continue  gabapentin to 300mg PO  TID. Could consider titrating further in outpatient setting as tolerated.  Max dose is 3600mg/day.  5. Continue lidocaine patch 4%, 1 patch TD Q 24 hours, 12 hours on and 12 hours off.  6. Continue menthol patch 5%, 1 patch TD Q 8 hours.  7. Bowel regimen to prevent opioid induced constipation  8. Upon discharge, may refer patient to [facility] pain clinic at [phone] for continued pain management:  Could consider right bursa joint injection however pain likely coming from L4 spine---may be more beneficial with an epidural steroid injection but patient is currently on Xarelto 20mg daily which will require holding at least 3 days and needs coordination to be done safely and if oncology is amenable to this.     Pain Service will sign off, discharging today.     Recommendations were discussed with medicine team  Plan was reviewed by the Pain Service staff anesthesiologist Dr. Holliday     Thank you for consulting the Inpatient Pain Management Service.   The above recommendations are to be acted upon at the primary team s discretion.    To reach us:  Mon - Friday 8 AM - 3 PM: Pager [phone] (Text Page)  After hours, weekends and holidays: Primary service should call [phone] the answering service for the on-call pain specialist      PAIN MEDICATION SAFE USE:   Prior to discharge instruct patient on the following in addition to the medication fact sheet:    Caution: these medications can cause sedation    Take prescription medicine only if it has been prescribed by your doctor    Do not take more medicine or take it more often than instructed     Call your doctor if pain gets worse    Never  mix pain medicine with alcohol, sleeping pills, or any illicit drugs    Do not operate heavy machinery, including vehicles, when initiation opioid therapy or increasing dosage    Store prescription opioids in a locked container, whenever possible     Dispose of unused opioids appropriately     Do not stop abruptly once at higher doses.  These medications must be tapered off.    Opioid pain medications do carry the risk for physical dependence and addiction and patients should be counseled about this.     Interval History:  Teresa Sanderson was seen today (03/24/21) and she reports that pain is 8/10 as she is just waking up and getting medications for the day.  Feels that pain has improved since admission. Plan above reviewed. Discussed following with pain clinic in outpatient setting which she is agreeable.  Planning for home discharge today, daughter is her main support.        -- Inpatient Medications Related to Pain Management:   Medications related to Pain Management (From now, onward)    Start     Dose/Rate Route Frequency Ordered Stop    03/24/21 0000  morphine (MS CONTIN) 15 MG CR tablet      15 mg Oral EVERY 12 HOURS 03/24/21 0914      03/24/21 0000  oxyCODONE (ROXICODONE) 5 MG tablet      5-10 mg Oral EVERY 3 HOURS PRN 03/24/21 0914      03/24/21 0000  gabapentin (NEURONTIN) 300 MG capsule      300 mg Oral 3 TIMES DAILY 03/24/21 0914      03/24/21 0000  methocarbamol (ROBAXIN) 500 MG tablet      500 mg Oral 4 TIMES DAILY 03/24/21 0914      03/22/21 2000  Lidocaine (LIDOCARE) 4 % Patch 2 patch      2 patch  over 12 Hours Transdermal EVERY 24 HOURS 2000 03/22/21 1957      03/22/21 1400  gabapentin (NEURONTIN) capsule 300 mg      300 mg Oral 3 TIMES DAILY 03/22/21 1049      03/22/21 1200  methocarbamol (ROBAXIN) tablet 500 mg      500 mg Oral 4 TIMES DAILY 03/22/21 1049      03/22/21 1100  oxyCODONE (ROXICODONE) tablet 5-10 mg      5-10 mg Oral EVERY 3 HOURS PRN 03/22/21 1049      03/22/21 1100  lidocaine  patch in PLACE       Transdermal EVERY 8 HOURS 03/22/21 1050      03/22/21 0800  polyethylene glycol (MIRALAX) Packet 17 g      17 g Oral DAILY 03/21/21 1940 03/21/21 2000  morphine (MS CONTIN) 12 hr tablet 15 mg      15 mg Oral EVERY 12 HOURS 03/21/21 1940            LAB DATA:  Recent Labs   Lab 03/22/21  0735 03/21/21  1444   CR 0.91 1.12*   WBC 3.4* 4.5   HGB 11.4* 11.6*   AST 7 22   ALT 28 32       /53 (BP Location: Left arm)   Pulse 75   Temp 97.9  F (36.6  C) (Oral)   Resp 16   Ht 1.829 m (6')   Wt 112.6 kg (248 lb 3.2 oz)   LMP 01/21/2021   SpO2 95%   BMI 33.66 kg/m     EXAM:  CONSTITUTIONAL/GENERAL APPEARANCE: sitting at edge of bed. Conversant.  EYES: EOMI, sclerae clear  ENT/NECK: neck is supple  RESPIRATORY: breathing on room air  CARDIOVASCULAR: HR within normal limits  GI:soft, nontender, bowel sounds present  MUSCULOSKELETAL/BACK/SPINE/EXTREMITIES: Moving UE and LE well.       NEURO: decreased light touch sensation on right L4 distribution.  SKIN/VASCULAR EXAM:  Dry and warm.  PSYCHIATRIC/BEHAVIORAL/OBSERVATIONS:  No objective signs of pain observed during our interview.   Judgment/Insight -fair   Orientation - x3   Memory -good   Mood and affect - calm, pleasant, cooperative        ----------------------------------------------------------------------------------  Chayo Adorno PA-C  Inpatient Pain Service     Helpful Resources:  Getting Rid of Unwanted Medications (printable PDF for patients)   Opioid Overdose Prevention Toolkit (printable PDF for patients)   Prescription Opioids: What You Need To Know (printable PDF for patients)

## 2021-03-24 NOTE — DISCHARGE SUMMARY
[facility]  Hospitalist Discharge Summary      Date of Admission:  3/21/2021  Date of Discharge:  3/24/2021  Discharging Provider: Ady Robbins MD  Discharge Team: Hospitalist Service, Gold 11    Discharge Diagnoses   Right hip knee  Right trochanteric bursitis versus lumbar radiculopathy  Breast cancer  Metastatic disease  Moderate obesity  History of deep vein thrombosis    Follow-ups Needed After Discharge   Follow-up Appointments     Adult [facility] Follow-up and recommended labs and tests      Follow up with primary care provider, Physician No Ref-Primary, within 7   days to evaluate medication change.  No follow up labs or test are needed.    Follow up with behavioral psychology    Follow up with pain management       Appointments on [place] and/or [facility] (with [facility] or [facility]   provider or service). Call [phone] if you haven't heard regarding   these appointments within 7 days of discharge.             Unresulted Labs Ordered in the Past 30 Days of this Admission     No orders found from 2/19/2021 to 3/22/2021.      These results will be followed up by primary care physician    Discharge Disposition   Discharged to home  Condition at discharge: Stable    Hospital Course    this is a 45-year-old female with past medical history of breast cancer and associated DVT currently on NOAC.  Patient has known metastasis to bone including spine.  Patient presented secondary to worsening right hip pain.  MRI of the hip was performed which showed no evidence of proximal right femur fracture or stress fracture.  Patient was ruled out for avascular necrosis.  There was mild chondrosis and a small anterior labral repair.  Evidence of greater trochanteric bursitis with distal gluteal peritendinitis was noted.  Pain management evaluated the patient with recommendations to continue pain plan.  Patient was discharged on MS Contin, oxycodone, gabapentin, and Robaxin.   "Patient is to follow-up in pain management as written for.    Consultations This Hospital Stay   PAIN MANAGEMENT ADULT IP CONSULT  PHYSICAL THERAPY ADULT IP CONSULT  ONCOLOGY ADULT IP CONSULT  ONCOLOGY ADULT IP CONSULT  PSYCHOLOGY ADULT IP CONSULT  CARE MANAGEMENT / SOCIAL WORK IP CONSULT    Code Status   Full Code    Time Spent on this Encounter   I, Ady Robbins MD, personally saw the patient today and spent greater than 30 minutes discharging this patient.       Ady Robbins MD  [facility] [address] [address]  Phone: [phone]  ______________________________________________________________________    Physical Exam   Vital Signs: Temp: 97.9  F (36.6  C) Temp src: Oral BP: 108/53 Pulse: 75   Resp: 16 SpO2: 95 % O2 Device: None (Room air)    Weight: 249 lbs 1.92 oz  Physical Exam  Constitutional:       Appearance: She is obese. She is not ill-appearing.   HENT:      Head: Normocephalic.      Nose: Nose normal.      Mouth/Throat:      Mouth: Mucous membranes are moist.   Eyes:      Pupils: Pupils are equal, round, and reactive to light.   Cardiovascular:      Rate and Rhythm: Normal rate.      Heart sounds: No murmur. No friction rub. No gallop.    Pulmonary:      Effort: Pulmonary effort is normal. No respiratory distress.      Breath sounds: No wheezing.   Abdominal:      General: Abdomen is flat. There is no distension.      Palpations: There is no mass.      Tenderness: There is no abdominal tenderness.   Neurological:      Sensory: Sensory deficit present.      Comments: Replacement and right lower extremity characterized as a \"waterfall sensation of numbness\"              Primary Care Physician   Physician No Ref-Primary    Discharge Orders      MENTAL HEALTH REFERRAL  - Adult; Outpatient Treatment; Individual/Couples/Family/Group Therapy/Health Psychology; [facility]: [facility] [phone]; We will contact you to schedule the appointment or " please call with any questions      Reason for your hospital stay    Radiculopathy from bony mets s/p radiation, bursitis of right hip     Adult [facility] Follow-up and recommended labs and tests    Follow up with primary care provider, Physician No Ref-Primary, within 7 days to evaluate medication change.  No follow up labs or test are needed.    Follow up with behavioral psychology    Follow up with pain management       Appointments on [place] and/or [facility] (with [facility] or [facility] provider or service). Call [phone] if you haven't heard regarding these appointments within 7 days of discharge.     Activity    Your activity upon discharge: activity as tolerated     Full Code     Walker Order    DME Documentation:   Describe the reason for need to support medical necessity: Patient with severe radiating low back pain requiring upper extremity assist on front wheeled walker to prevent fall or loss of balance.     I, the undersigned, certify that the above prescribed supplies are medically necessary for this patient and is both reasonable and necessary in reference to accepted standards of medical and necessary in reference to accepted standards of medical practice in the treatment of this patient's condition and is not prescribed as a convenience.     Diet    Follow this diet upon discharge: Orders Placed This Encounter      Regular Diet Adult       Significant Results and Procedures   Results for orders placed or performed during the hospital encounter of 03/21/21   Lumbar spine XR, 2-3 views    Narrative    EXAM: XR LUMBAR SPINE 2-3 VIEWS  LOCATION: [facility]  DATE/TIME: 03/21/2021, 2:07 PM    INDICATION: Low back pain.  COMPARISON: 12/05/2020 MRI.  TECHNIQUE: CR Lumbar Spine.      Impression    IMPRESSION: No fracture.  Normal vertebral heights. Degenerative narrowing at the L3-L4 and L5-S1 interspaces. Mild lower lumbar facet arthrosis. Normal extraspinal structures.       XR Pelvis 1/2  Views    Narrative    EXAM: XR PELVIS 1/2 VIEW  LOCATION: [facility]  DATE/TIME: 03/21/2021, 2:07 PM    INDICATION: Pelvic pain.  COMPARISON: None.      Impression    IMPRESSION:  1.  No fracture or joint malalignment.  2.  Mild bilateral hip degenerative arthrosis.  3.  Degenerative changes in the lower lumbar spine.       XR Femur Right 2 Views    Narrative    EXAM: XR FEMUR RT 2 VW  LOCATION: [facility]  DATE/TIME: 3/21/2021 2:06 PM    INDICATION: Right femoral pain.  COMPARISON: None.      Impression    IMPRESSION:  1.  The right femur is within normal limits. No fracture.  2.  Mild right knee degenerative arthrosis.   MR Hip Right w/o Contrast    Narrative    EXAM: MR HIP RIGHT W/O CONTRAST  LOCATION: [facility]  DATE/TIME: 3/21/2021 6:17 PM    INDICATION: Right hip pain.  COMPARISON: Pelvis and right femur radiographic exam today.  TECHNIQUE: Unenhanced.    FINDINGS:  Technically suboptimal exam. Patient motion degrades image quality and precludes full assessment.    RIGHT HIP: Probable small anterior right acetabular labral tear. Chondral labral separation along the superior labrum. Scattered areas of mild right hip chondrosis. Small focus of subchondral marrow edema in the femoral head as seen on image 15 of series   6 with marrow edema along the lateral acetabulum superiorly. Benign cyst in adjacent marrow edema in the anteromedial femoral head. No evidence for proximal right femur fracture, femoral neck stress fracture, or classic femoral head AVN. Ligamentum   teres appears intact. No acetabular retroversion. No sizable hip joint effusion.    TENDONS:   Gluteal medius and minimus tendons intact. No tendinopathy. Mild greater trochanteric bursitis with distal gluteal peritendinitis.  Hamstring origin tendinopathy with possibly hamstring origin tendinitis. No significant hamstring origin tear or distal tendon retraction.   No iliopsoas tendon tear or tendinopathy.  No iliopsoas bursitis.    Right rectus femoris origin intact. Right adductor tendon origin intact.    BONES: No evidence for right pelvic fracture involving the superior or inferior pubic ramus. No evidence for acetabular fracture.    SOFT TISSUES: No significant muscle atrophy. No acute muscle edema. No inguinal adenopathy.    INTRA-PELVIC CONTENTS: No soft tissue abnormality detected within the visualized intrapelvic contents.      Impression    IMPRESSION:  1.  No evidence for proximal right femur fracture, femoral neck stress fracture or classic femoral head AVN.  2.  Mild right hip chondrosis with probably a small anterior labral tear.  3.  Mild right greater trochanteric bursitis/distal gluteal peritendinitis.             Discharge Medications   Discharge Medication List as of 3/24/2021 10:20 AM      CONTINUE these medications which have CHANGED    Details   gabapentin (NEURONTIN) 300 MG capsule Take 1 capsule (300 mg) by mouth 3 times daily, Disp-90 capsule, R-0, Local Print      methocarbamol (ROBAXIN) 500 MG tablet Take 1 tablet (500 mg) by mouth 4 times daily, Disp-120 tablet, R-0, Local Print      morphine (MS CONTIN) 15 MG CR tablet Take 1 tablet (15 mg) by mouth every 12 hours, Disp-28 tablet, R-0, Local Print      oxyCODONE (ROXICODONE) 5 MG tablet Take 1-2 tablets (5-10 mg) by mouth every 3 hours as needed for moderate to severe pain, Disp-56 tablet, R-0, Local Print         CONTINUE these medications which have NOT CHANGED    Details   anastrozole (ARIMIDEX) 1 MG tablet Take 1 tablet (1 mg) by mouth daily for 28 days, Disp-28 tablet, R-0, E-Prescribe      Lidocaine (LIDOCARE) 4 % Patch Place 1-2 patches onto the skin every 24 hours To prevent lidocaine toxicity, patient should be patch free for 12 hrs daily.Disp-30 patch, [id number]      naloxone (NARCAN) 4 MG/0.1ML nasal spray Spray 1 spray (4 mg) into one nostril alternating nostrils once as needed for opioid reversal every 2-3 minutes until  assistance arrives, Disp-0.2 mL, R-0, E-Prescribe      nicotine (NICODERM CQ) 14 MG/24HR 24 hr patch Place 1 patch onto the skin daily, Disp-30 patch, R-1, E-Prescribe      ondansetron (ZOFRAN-ODT) 4 MG ODT tab Take 1 tablet (4 mg) by mouth every 6 hours as needed for nausea or vomiting, Disp-120 tablet, R-0, E-PrescribePlease take upto 6 hours daily as needed for nausea      palbociclib (IBRANCE) 125 MG tablet Take 1 tablet (125 mg) by mouth daily Take for 21 days, then 7 days off., Disp-21 tablet, R-0, E-Prescribe      pantoprazole (PROTONIX) 40 MG EC tablet Take 1 tablet (40 mg) by mouth every morning (before breakfast), Disp-30 tablet, R-1, E-Prescribe      polyethylene glycol (MIRALAX) 17 GM/Dose powder Take 17 g by mouth daily, Disp-510 g, R-1, E-Prescribe      prochlorperazine (COMPAZINE) 10 MG tablet Take 1 tablet (10 mg) by mouth every 6 hours as needed (Nausea/Vomiting), Disp-30 tablet, R-2, E-Prescribe      QUEtiapine (SEROQUEL) 100 MG tablet Take 1 tablet (100 mg) by mouth At Bedtime, Disp-30 tablet, R-1, E-Prescribe      rivaroxaban ANTICOAGULANT (XARELTO) 20 MG TABS tablet Take 1 tablet (20 mg) by mouth daily (with dinner), Disp-30 tablet, R-11, E-Prescribe      sertraline (ZOLOFT) 50 MG tablet Take 1 tablet (50 mg) by mouth daily, Disp-30 tablet, R-1, E-Prescribe         STOP taking these medications       oxyCODONE (OXY-IR) 5 MG capsule Comments:   Reason for Stopping:             Allergies   No Known Allergies

## 2021-03-24 NOTE — PLAN OF CARE
Admitted on 3/22 for worsening R leg pain. AVSS. A&O x 4. R PIV SL. Oxycodone PRN, heating pad in use and lidocaine patches on bilateral sides of back for pain. Regular diet, tolerating well. SBA with walker. Voids spont, not saving. Passing gas, no BM today. Plan to discharge home tomorrow.

## 2021-03-24 NOTE — PLAN OF CARE
Alert and oriented x 4. Complained of pain in right leg and back. Oxycodone given. Denies nausea. Up to the BR with SBA and voiding. Call light in reach and able to make needs known.

## 2021-03-24 NOTE — PROGRESS NOTES
The patient is scheduled for clinic follow up within 24-48 hours of hospital discharge. No post-hospital call is needed at this time.        Katheryn Howard CMA,  Post Hospital Discharge Team

## 2021-03-24 NOTE — PLAN OF CARE
"Problem: Pain Acute  Goal: Acceptable Pain Control and Functional Ability  Outcome: Adequate for Discharge  Intervention: Prevent or Manage Pain  Recent Flowsheet Documentation  Taken 3/24/2021 0900 by Breanna Najera RN  Bowel Elimination Promotion: (passing flatus)   adequate fluid intake promoted   ambulation promoted     Patient with tear in labrum, bursitis, and breast cancer w/ mets to bone. Has scheduled morphine and PRN oxycodone, 5-10mg every 3 hours PRN. Educated patient on importance of monitoring pain and utilizing medications for pain management, as well as the importance of having narcan in the home. Patient states she \"has the little box\" of narcan at home; understands that she would use that if her mental status changes, if she is not breathing well, after taking narcotics.     /53 (BP Location: Left arm)   Pulse 75   Temp 97.9  F (36.6  C) (Oral)   Resp 16   Ht 1.829 m (6')   Wt 112.6 kg (248 lb 3.2 oz)   LMP 01/21/2021   SpO2 95%   BMI 33.66 kg/m  .     Problem: Discharge Planning  Goal: Discharge Planning (Adult, OB, Behavioral, Peds)  Outcome: Adequate for Discharge     Discharge  D: Orders for discharge and outpatient medications written  I: home medications and return to clinic schedule reviewed with patient. Discharge instructions and parameters for calling provided. Health Care Provider reviewed with teach back. Patient left at 1150 accompanied by transport assistant, via wheelchair.  A: Patient verbalized understanding, and was ready for discharge   P: patient instructed to  medications in Pharmacy. Follow up as scheduled.     "

## 2021-03-24 NOTE — PROGRESS NOTES
Oncology Visit:   Date on this visit: Mar 25, 2021      Diagnosis:  ER positive left breast cancer metastasized to bones.     Primary Physician: No Ref-Primary, Physician     History Of Present Illness:    Ms. Sanderson is a 45 year old female with a h/o tobacco abuse and DVTs with left breast cancer metastasized to bone. She presented to [facility] with back pain on 12/5/2020. MRI of the L-spine showed an abnormal L4 lesion with associated right paraspinal mass, abnormalities in L5 and the left iliac bone were also seen. CT C/A/P showed a left breast mass, lytic lesions of T7, L4, and the pelvis, and a 3 cm lesion in the kidney (thought to be a cyst). Ultrasound of the bilateral lower extremities showed a non-occlusive thrombus in the left popliteal vein. Mammogram and ultrasound of the bilateral breasts on 12/17/2020 showed a spiculated mass measuring at least 7.8 cm at 12-1:00 left breast extending from the nipple to 9 cm from the nipple with associated nipple retraction. This mass was biopsied, and showed IDC with surrounding DCIS, grade 3, ER+ 90%, and CA+ 75%.  HER2 was equivocal in approximately 35% of tumor cells by FISH and was negative by IHC.    Metastatic Breast Cancer Treatment:  12/23/2021 - 1/7/2021  Radiation (3000 cGy) to the lumbar spine.  1/29/21- present Ibrance 125 mg days 1-21 every 28 days, anastrozole, zoladex    Admission 3/21-3/24 with worsening R hip pain. MRI of hip showed no fracture or AVN. She had mild chondrosis and small anterior labral tear as well as mild bursitis and tendonitis.     Interval History:   - Teresa is having significant right leg pain today - 8/10. This has been ongoing for the past couple of weeks. Pain radiates from side of leg near hip down anteriorly through foot, feels numb. She presented to the ED on 3/21 with findings as noted above. Gabapentin dose increased to 300 mg TID and oxycodone 10 mg every 3 hours, she is also taking robaxin QID and MS contin Q12.  She has not felt any improvement yet. Yesterday she went outside to cool off from a hot flash and felt her right leg give out. She fell to the ground. Denies feeling lightheaded or dizzy, not syncopal. She was able to get herself back up. She has been using a walker to get around and finds this helpful.   - Feeling very fatigued - she is maybe up on her feet for a total of 2-3 hours per day in 10-15 increments. She is able to do a little bit of cleaning or cooking and then needs to sit down and rest.     - Mental health has been declining. She is feeling frustrated given pain and disease, reports feeling at the end of her rope this past Sunday - she had thoughts of jumping off a bridge to end her life. She reports a history of schizoaffective disorder, has been on seroquel and sertraline for years. She is trying to see a psychiatrist and scheduled the first available visit which isn't until June. She does not have any current thoughts of suicide. She is reassured hip studies were without cancer.     ROS otherwise negative.       Past Medical/Surgical History:   Past Medical History:   Diagnosis Date     Anxiety      Breast CA (H) 12/2020     Depression      DVT (deep venous thrombosis) (H) 2014     Left breast mass     x approximately 4-5 months     Pulmonary emboli (H)      Pyelonephritis      Schizoaffective disorder (H)      Tobacco use      Past Surgical History:   Procedure Laterality Date     TUBAL LIGATION  1998      No Known Allergies    Current Outpatient Medications   Medication     anastrozole (ARIMIDEX) 1 MG tablet     gabapentin (NEURONTIN) 300 MG capsule     Lidocaine (LIDOCARE) 4 % Patch     methocarbamol (ROBAXIN) 500 MG tablet     morphine (MS CONTIN) 15 MG CR tablet     naloxone (NARCAN) 4 MG/0.1ML nasal spray     nicotine (NICODERM CQ) 14 MG/24HR 24 hr patch     ondansetron (ZOFRAN-ODT) 4 MG ODT tab     oxyCODONE (ROXICODONE) 5 MG tablet     palbociclib (IBRANCE) 125 MG tablet     pantoprazole  (PROTONIX) 40 MG EC tablet     polyethylene glycol (MIRALAX) 17 GM/Dose powder     prochlorperazine (COMPAZINE) 10 MG tablet     QUEtiapine (SEROQUEL) 100 MG tablet     rivaroxaban ANTICOAGULANT (XARELTO) 20 MG TABS tablet     sertraline (ZOLOFT) 50 MG tablet     No current facility-administered medications for this visit.    '  Physical Exam:   Physical Exam  Constitutional:       Appearance: Normal appearance.   HENT:      Mouth/Throat:      Mouth: Mucous membranes are moist.   Cardiovascular:      Rate and Rhythm: Normal rate and regular rhythm.      Pulses: Normal pulses.      Heart sounds: Normal heart sounds. No murmur. No friction rub. No gallop.    Pulmonary:      Effort: Pulmonary effort is normal.      Breath sounds: Normal breath sounds. No wheezing, rhonchi or rales.   Musculoskeletal:      Right lower leg: No edema.      Left lower leg: No edema.      Comments: Right lower extremity weakness with knee flexion, 4/5.   Sensation dulled on right sole.    Skin:     General: Skin is warm and dry.   Neurological:      Mental Status: She is alert and oriented to person, place, and time.   Psychiatric:         Attention and Perception: Attention normal.         Mood and Affect: Mood and affect normal.         Speech: Speech normal.         Behavior: Behavior normal. Behavior is cooperative.       Laboratory/Imaging Studies      3/25/2021 11:08   Sodium 137   Potassium 4.2   Chloride 105   Carbon Dioxide 24   Urea Nitrogen 18   Creatinine 0.71   GFR Estimate >90   GFR Estimate If Black >90   Calcium 9.4   Anion Gap 7   Albumin 3.5   Protein Total 7.7   Bilirubin Total 0.2   Alkaline Phosphatase 89   ALT 59 (H)   AST 11   Glucose 135 (H)   WBC 5.5   Hemoglobin 12.2   Hematocrit 36.6   Platelet Count 219   RBC Count 3.93   MCV 93   MCH 31.0   MCHC 33.3   RDW 16.5 (H)   Diff Method Automated Method   % Neutrophils 76.2   % Lymphocytes 16.1   % Monocytes 6.4   % Eosinophils 0.0   % Basophils 0.2   % Immature  Granulocytes 1.1   Nucleated RBCs 0   Absolute Neutrophil 4.2   Absolute Lymphocytes 0.9   Absolute Monocytes 0.4   Absolute Eosinophils 0.0   Absolute Basophils 0.0   Abs Immature Granulocytes 0.1   Absolute Nucleated RBC 0.0      1/18/2021 14:10 2/16/2021 14:24 3/1/2021 09:39 3/25/2021 11:08   CA 27-29 84 (H) 51 (H) 61 (H) 65 (H)   CEA 2.6 (H) 2.2 3.3 (H) 3.8 (H)         ASSESSMENT/PLAN:   45 year old female with history of DVT with left breast invasive ductal carcinoma, ER/WV positive, HER2 negative metastasized to bones.    1.  Metastatic breast cancer:  She completed palliative radiation to the lumbar spine and started on treatment in January with Ibrance, zoladex, and anastrozole. She is tolerating well besides hot flashes, which she reports are managebale. Last zoladex 3/16. Okay to start cycle 3 of Ibrance tomorrow, 3/26.      Tumor markers have generally declined since January but have been slowly increasing now this past month. Will plan for PET/CT prior to cycle 4.     2.  Bone metastases:  She is s/p XRT to the lumbar spine. She started zometa on  1/18/21 and will receive every 3 months. For pain she continues on MS Contin 15 mg BID and oxycodone PRN.   Per her insurance company, from here forward, can only receive a 7 day supply at a time.    -Spine MRI done 3/11 showed improvement in sacral mass. Indeterminate response L3 and L2.     3. Right leg pain   - MRIs (back and right hip) suggest pain likely due to bilateral foraminal narrowing at L4-5 and bursitis. We discussed continuing gabapentin as this medication will be the most effective for this type of pain. Referral to PT to help strengthen muscles and core and decrease pain. Will need to monitor for CNS depression with psych meds, muscle relaxant, opioids, and gabapentin.     4. Fatigue: Likely a combination of treatment and multiple pain medications.   Discussed decreasing pain medications. Per patient, oxycodone is giving her the least amount of  pain relief, decreased to 10 mg every 6 hours as needed. Will continue MS Contin BID, robaxin QID, and gabapentin TID.     5. DVT:  Recommend continuing Xarelto indefinitely with metastatic disease.      6.  Anxiety/hx of schizoaffective disorder:  Continue Zoloft 50 mg PO daily and Seroquel 100 mg PO at bedtime. Encouraged patient to follow up with psychiatrist. She would benefit from speaking to a counselor as well. Denied suicidal ideation today.     7. Migraines/hot flashes: Migraines have resolved. Patient feels hot flashes are manageable with gabapentin.     8. Blurry vision: Could be from prior dex. Negative brain MRI in December. Recommended seeing eye doctor.     9. Opioid-induced constipation: Improved - Miralax to use daily as needed.      90 minutes spent on the date of the encounter doing chart review, review of test results, interpretation of tests, patient visit, documentation and care coordination.        Claudia Velarde, Student NP     The patient was seen in conjunction with Claudia Velarde NP student  who served as a scribe for today's visit. I have reviewed and edited the above note, and agree with the above findings and plan.      Alee Yang PA-C

## 2021-03-25 ENCOUNTER — APPOINTMENT (OUTPATIENT)
Dept: LAB | Facility: CLINIC | Age: 46
End: 2021-03-25
Attending: PHYSICIAN ASSISTANT
Payer: COMMERCIAL

## 2021-03-25 ENCOUNTER — ONCOLOGY VISIT (OUTPATIENT)
Dept: ONCOLOGY | Facility: CLINIC | Age: 46
End: 2021-03-25
Attending: PHYSICIAN ASSISTANT
Payer: COMMERCIAL

## 2021-03-25 VITALS
WEIGHT: 250.2 LBS | RESPIRATION RATE: 18 BRPM | BODY MASS INDEX: 33.93 KG/M2 | SYSTOLIC BLOOD PRESSURE: 147 MMHG | TEMPERATURE: 97.7 F | OXYGEN SATURATION: 95 % | DIASTOLIC BLOOD PRESSURE: 81 MMHG | HEART RATE: 78 BPM

## 2021-03-25 DIAGNOSIS — M70.61 TROCHANTERIC BURSITIS OF RIGHT HIP: ICD-10-CM

## 2021-03-25 DIAGNOSIS — C79.51 SPINE METASTASIS: ICD-10-CM

## 2021-03-25 DIAGNOSIS — F25.9 SCHIZOAFFECTIVE DISORDER, UNSPECIFIED TYPE (H): Primary | ICD-10-CM

## 2021-03-25 DIAGNOSIS — M54.16 LUMBAR RADICULOPATHY: ICD-10-CM

## 2021-03-25 DIAGNOSIS — C50.919 METASTATIC BREAST CANCER: ICD-10-CM

## 2021-03-25 LAB
ALBUMIN SERPL-MCNC: 3.5 G/DL (ref 3.4–5)
ALP SERPL-CCNC: 89 U/L (ref 40–150)
ALT SERPL W P-5'-P-CCNC: 59 U/L (ref 0–50)
ANION GAP SERPL CALCULATED.3IONS-SCNC: 7 MMOL/L (ref 3–14)
AST SERPL W P-5'-P-CCNC: 11 U/L (ref 0–45)
BASOPHILS # BLD AUTO: 0 10E9/L (ref 0–0.2)
BASOPHILS NFR BLD AUTO: 0.2 %
BILIRUB SERPL-MCNC: 0.2 MG/DL (ref 0.2–1.3)
BUN SERPL-MCNC: 18 MG/DL (ref 7–30)
CALCIUM SERPL-MCNC: 9.4 MG/DL (ref 8.5–10.1)
CANCER AG27-29 SERPL-ACNC: 65 U/ML (ref 0–39)
CEA SERPL-MCNC: 3.8 UG/L (ref 0–2.5)
CHLORIDE SERPL-SCNC: 105 MMOL/L (ref 94–109)
CO2 SERPL-SCNC: 24 MMOL/L (ref 20–32)
CREAT SERPL-MCNC: 0.71 MG/DL (ref 0.52–1.04)
DIFFERENTIAL METHOD BLD: ABNORMAL
EOSINOPHIL # BLD AUTO: 0 10E9/L (ref 0–0.7)
EOSINOPHIL NFR BLD AUTO: 0 %
ERYTHROCYTE [DISTWIDTH] IN BLOOD BY AUTOMATED COUNT: 16.5 % (ref 10–15)
GFR SERPL CREATININE-BSD FRML MDRD: >90 ML/MIN/{1.73_M2}
GLUCOSE SERPL-MCNC: 135 MG/DL (ref 70–99)
HCT VFR BLD AUTO: 36.6 % (ref 35–47)
HGB BLD-MCNC: 12.2 G/DL (ref 11.7–15.7)
IMM GRANULOCYTES # BLD: 0.1 10E9/L (ref 0–0.4)
IMM GRANULOCYTES NFR BLD: 1.1 %
LYMPHOCYTES # BLD AUTO: 0.9 10E9/L (ref 0.8–5.3)
LYMPHOCYTES NFR BLD AUTO: 16.1 %
MCH RBC QN AUTO: 31 PG (ref 26.5–33)
MCHC RBC AUTO-ENTMCNC: 33.3 G/DL (ref 31.5–36.5)
MCV RBC AUTO: 93 FL (ref 78–100)
MONOCYTES # BLD AUTO: 0.4 10E9/L (ref 0–1.3)
MONOCYTES NFR BLD AUTO: 6.4 %
NEUTROPHILS # BLD AUTO: 4.2 10E9/L (ref 1.6–8.3)
NEUTROPHILS NFR BLD AUTO: 76.2 %
NRBC # BLD AUTO: 0 10*3/UL
NRBC BLD AUTO-RTO: 0 /100
PLATELET # BLD AUTO: 219 10E9/L (ref 150–450)
POTASSIUM SERPL-SCNC: 4.2 MMOL/L (ref 3.4–5.3)
PROT SERPL-MCNC: 7.7 G/DL (ref 6.8–8.8)
RBC # BLD AUTO: 3.93 10E12/L (ref 3.8–5.2)
SODIUM SERPL-SCNC: 137 MMOL/L (ref 133–144)
WBC # BLD AUTO: 5.5 10E9/L (ref 4–11)

## 2021-03-25 PROCEDURE — 82378 CARCINOEMBRYONIC ANTIGEN: CPT | Performed by: PHYSICIAN ASSISTANT

## 2021-03-25 PROCEDURE — 99417 PROLNG OP E/M EACH 15 MIN: CPT | Performed by: PHYSICIAN ASSISTANT

## 2021-03-25 PROCEDURE — 80053 COMPREHEN METABOLIC PANEL: CPT | Performed by: PHYSICIAN ASSISTANT

## 2021-03-25 PROCEDURE — G0463 HOSPITAL OUTPT CLINIC VISIT: HCPCS

## 2021-03-25 PROCEDURE — 36415 COLL VENOUS BLD VENIPUNCTURE: CPT

## 2021-03-25 PROCEDURE — 85025 COMPLETE CBC W/AUTO DIFF WBC: CPT | Performed by: PHYSICIAN ASSISTANT

## 2021-03-25 PROCEDURE — 99215 OFFICE O/P EST HI 40 MIN: CPT | Performed by: PHYSICIAN ASSISTANT

## 2021-03-25 PROCEDURE — 86300 IMMUNOASSAY TUMOR CA 15-3: CPT | Performed by: PHYSICIAN ASSISTANT

## 2021-03-25 ASSESSMENT — PAIN SCALES - GENERAL: PAINLEVEL: EXTREME PAIN (8)

## 2021-03-25 NOTE — NURSING NOTE
Oncology Rooming Note    March 25, 2021 11:20 AM   Teresa Sanderson is a 45 year old female who presents for:    Chief Complaint   Patient presents with     Blood Draw     Labs drawn from  by RN in lab. Vitals taken. Checked into next appointment.      Oncology Clinic Visit     metastatic breast cancer     Initial Vitals: BP (!) 147/81 (BP Location: Right arm, Patient Position: Sitting, Cuff Size: Adult Large)   Pulse 78   Temp 97.7  F (36.5  C) (Oral)   Resp 18   Wt 113.5 kg (250 lb 3.2 oz)   SpO2 95%   BMI 33.93 kg/m   Estimated body mass index is 33.93 kg/m  as calculated from the following:    Height as of 3/21/21: 1.829 m (6').    Weight as of this encounter: 113.5 kg (250 lb 3.2 oz). Body surface area is 2.4 meters squared.  Extreme Pain (8) Comment: Data Unavailable   No LMP recorded.  Allergies reviewed: Yes  Medications reviewed: Yes    Medications: Medication refills not needed today.  Pharmacy name entered into [facility]:    [facility] [place], [place] - [id number]  [facility] [place], [place] [facility] [address] [place] - [address]  [id number] - [place], [place] - [address] [facility] [address] [facility] - [place], [place] - [address]    Clinical concerns: bowel issues      Tootie Oshea CMA

## 2021-03-25 NOTE — NURSING NOTE
Chief Complaint   Patient presents with     Blood Draw     Labs drawn from  by RN in lab. Vitals taken. Checked into next appointment.      Labs drawn with IV start by RN in lab.  Vital signs taken.  Pt checked in to next appointment.    Abimbola Diaz RN

## 2021-03-25 NOTE — PLAN OF CARE
Physical Therapy Discharge Summary    Reason for therapy discharge:    Discharged to home with outpatient therapy.  All goals and outcomes met, no further needs identified.    Progress towards therapy goal(s). See goals on Care Plan in [facility] electronic health record for goal details.  Goals met    Therapy recommendation(s):    Continued therapy is recommended.  Rationale/Recommendations:  progress functional mobility, strength and I.

## 2021-03-25 NOTE — PROGRESS NOTES
Patient's home  chemo medication was left behind in the 7C medication room.  Patient was called by writer to notify her.  Patient stated she will call back the unit to let 7C know when someone will come  medication

## 2021-03-25 NOTE — LETTER
3/25/2021         RE: Teresa Sanderson  [address] [address]      Oncology Visit:   Date on this visit: Mar 25, 2021      Diagnosis:  ER positive left breast cancer metastasized to bones.     Primary Physician: No Ref-Primary, Physician     History Of Present Illness:    Ms. Sanderson is a 45 year old female with a h/o tobacco abuse and DVTs with left breast cancer metastasized to bone. She presented to [place] ED with back pain on 12/5/2020. MRI of the L-spine showed an abnormal L4 lesion with associated right paraspinal mass, abnormalities in L5 and the left iliac bone were also seen. CT C/A/P showed a left breast mass, lytic lesions of T7, L4, and the pelvis, and a 3 cm lesion in the kidney (thought to be a cyst). Ultrasound of the bilateral lower extremities showed a non-occlusive thrombus in the left popliteal vein. Mammogram and ultrasound of the bilateral breasts on 12/17/2020 showed a spiculated mass measuring at least 7.8 cm at 12-1:00 left breast extending from the nipple to 9 cm from the nipple with associated nipple retraction. This mass was biopsied, and showed IDC with surrounding DCIS, grade 3, ER+ 90%, and DE+ 75%.  HER2 was equivocal in approximately 35% of tumor cells by FISH and was negative by IHC.    Metastatic Breast Cancer Treatment:  12/23/2021 - 1/7/2021  Radiation (3000 cGy) to the lumbar spine.  1/29/21- present Ibrance 125 mg days 1-21 every 28 days, anastrozole, zoladex    Admission 3/21-3/24 with worsening R hip pain. MRI of hip showed no fracture or AVN. She had mild chondrosis and small anterior labral tear as well as mild bursitis and tendonitis.     Interval History:   - Teresa is having significant right leg pain today - 8/10. This has been ongoing for the past couple of weeks. Pain radiates from side of leg near hip down anteriorly through foot, feels numb. She presented to the ED on 3/21 with findings as noted above. Gabapentin dose increased to 300 mg  TID and oxycodone 10 mg every 3 hours, she is also taking robaxin QID and MS contin Q12. She has not felt any improvement yet. Yesterday she went outside to cool off from a hot flash and felt her right leg give out. She fell to the ground. Denies feeling lightheaded or dizzy, not syncopal. She was able to get herself back up. She has been using a walker to get around and finds this helpful.   - Feeling very fatigued - she is maybe up on her feet for a total of 2-3 hours per day in 10-15 increments. She is able to do a little bit of cleaning or cooking and then needs to sit down and rest.     - Mental health has been declining. She is feeling frustrated given pain and disease, reports feeling at the end of her rope this past Sunday - she had thoughts of jumping off a bridge to end her life. She reports a history of schizoaffective disorder, has been on seroquel and sertraline for years. She is trying to see a psychiatrist and scheduled the first available visit which isn't until June. She does not have any current thoughts of suicide. She is reassured hip studies were without cancer.     ROS otherwise negative.       Past Medical/Surgical History:   Past Medical History:   Diagnosis Date     Anxiety      Breast CA (H) 12/2020     Depression      DVT (deep venous thrombosis) (H) 2014     Left breast mass     x approximately 4-5 months     Pulmonary emboli (H)      Pyelonephritis      Schizoaffective disorder (H)      Tobacco use      Past Surgical History:   Procedure Laterality Date     TUBAL LIGATION  1998      No Known Allergies    Current Outpatient Medications   Medication     anastrozole (ARIMIDEX) 1 MG tablet     gabapentin (NEURONTIN) 300 MG capsule     Lidocaine (LIDOCARE) 4 % Patch     methocarbamol (ROBAXIN) 500 MG tablet     morphine (MS CONTIN) 15 MG CR tablet     naloxone (NARCAN) 4 MG/0.1ML nasal spray     nicotine (NICODERM CQ) 14 MG/24HR 24 hr patch     ondansetron (ZOFRAN-ODT) 4 MG ODT tab      oxyCODONE (ROXICODONE) 5 MG tablet     palbociclib (IBRANCE) 125 MG tablet     pantoprazole (PROTONIX) 40 MG EC tablet     polyethylene glycol (MIRALAX) 17 GM/Dose powder     prochlorperazine (COMPAZINE) 10 MG tablet     QUEtiapine (SEROQUEL) 100 MG tablet     rivaroxaban ANTICOAGULANT (XARELTO) 20 MG TABS tablet     sertraline (ZOLOFT) 50 MG tablet     No current facility-administered medications for this visit.    '  Physical Exam:   Physical Exam  Constitutional:       Appearance: Normal appearance.   HENT:      Mouth/Throat:      Mouth: Mucous membranes are moist.   Cardiovascular:      Rate and Rhythm: Normal rate and regular rhythm.      Pulses: Normal pulses.      Heart sounds: Normal heart sounds. No murmur. No friction rub. No gallop.    Pulmonary:      Effort: Pulmonary effort is normal.      Breath sounds: Normal breath sounds. No wheezing, rhonchi or rales.   Musculoskeletal:      Right lower leg: No edema.      Left lower leg: No edema.      Comments: Right lower extremity weakness with knee flexion, 4/5.   Sensation dulled on right sole.    Skin:     General: Skin is warm and dry.   Neurological:      Mental Status: She is alert and oriented to person, place, and time.   Psychiatric:         Attention and Perception: Attention normal.         Mood and Affect: Mood and affect normal.         Speech: Speech normal.         Behavior: Behavior normal. Behavior is cooperative.       Laboratory/Imaging Studies      3/25/2021 11:08   Sodium 137   Potassium 4.2   Chloride 105   Carbon Dioxide 24   Urea Nitrogen 18   Creatinine 0.71   GFR Estimate >90   GFR Estimate If Black >90   Calcium 9.4   Anion Gap 7   Albumin 3.5   Protein Total 7.7   Bilirubin Total 0.2   Alkaline Phosphatase 89   ALT 59 (H)   AST 11   Glucose 135 (H)   WBC 5.5   Hemoglobin 12.2   Hematocrit 36.6   Platelet Count 219   RBC Count 3.93   MCV 93   MCH 31.0   MCHC 33.3   RDW 16.5 (H)   Diff Method Automated Method   % Neutrophils 76.2   %  Lymphocytes 16.1   % Monocytes 6.4   % Eosinophils 0.0   % Basophils 0.2   % Immature Granulocytes 1.1   Nucleated RBCs 0   Absolute Neutrophil 4.2   Absolute Lymphocytes 0.9   Absolute Monocytes 0.4   Absolute Eosinophils 0.0   Absolute Basophils 0.0   Abs Immature Granulocytes 0.1   Absolute Nucleated RBC 0.0      1/18/2021 14:10 2/16/2021 14:24 3/1/2021 09:39 3/25/2021 11:08   CA 27-29 84 (H) 51 (H) 61 (H) 65 (H)   CEA 2.6 (H) 2.2 3.3 (H) 3.8 (H)         ASSESSMENT/PLAN:   45 year old female with history of DVT with left breast invasive ductal carcinoma, ER/NY positive, HER2 negative metastasized to bones.    1.  Metastatic breast cancer:  She completed palliative radiation to the lumbar spine and started on treatment in January with Ibrance, zoladex, and anastrozole. She is tolerating well besides hot flashes, which she reports are managebale. Last zoladex 3/16. Okay to start cycle 3 of Ibrance tomorrow, 3/26.      Tumor markers have generally declined since January but have been slowly increasing now this past month. Will plan for PET/CT prior to cycle 4.     2.  Bone metastases:  She is s/p XRT to the lumbar spine. She started zometa on  1/18/21 and will receive every 3 months. For pain she continues on MS Contin 15 mg BID and oxycodone PRN.   Per her insurance company, from here forward, can only receive a 7 day supply at a time.    -Spine MRI done 3/11 showed improvement in sacral mass. Indeterminate response L3 and L2.     3. Right leg pain   - MRIs (back and right hip) suggest pain likely due to bilateral foraminal narrowing at L4-5 and bursitis. We discussed continuing gabapentin as this medication will be the most effective for this type of pain. Referral to PT to help strengthen muscles and core and decrease pain. Will need to monitor for CNS depression with psych meds, muscle relaxant, opioids, and gabapentin.     4. Fatigue: Likely a combination of treatment and multiple pain medications.   Discussed  decreasing pain medications. Per patient, oxycodone is giving her the least amount of pain relief, decreased to 10 mg every 6 hours as needed. Will continue MS Contin BID, robaxin QID, and gabapentin TID.     5. DVT:  Recommend continuing Xarelto indefinitely with metastatic disease.      6.  Anxiety/hx of schizoaffective disorder:  Continue Zoloft 50 mg PO daily and Seroquel 100 mg PO at bedtime. Encouraged patient to follow up with psychiatrist. She would benefit from speaking to a counselor as well. Denied suicidal ideation today.     7. Migraines/hot flashes: Migraines have resolved. Patient feels hot flashes are manageable with gabapentin.     8. Blurry vision: Could be from prior dex. Negative brain MRI in December. Recommended seeing eye doctor.     9. Opioid-induced constipation: Improved - Miralax to use daily as needed.      90 minutes spent on the date of the encounter doing chart review, review of test results, interpretation of tests, patient visit, documentation and care coordination.        Claudia Velarde, Student NP     The patient was seen in conjunction with Claudia Velarde NP student  who served as a scribe for today's visit. I have reviewed and edited the above note, and agree with the above findings and plan.      RICHARD Kim PA-C

## 2021-04-11 DIAGNOSIS — C79.51 CARCINOMA OF LEFT BREAST METASTATIC TO BONE (H): ICD-10-CM

## 2021-04-11 DIAGNOSIS — C50.912 CARCINOMA OF LEFT BREAST METASTATIC TO BONE (H): ICD-10-CM

## 2021-04-11 DIAGNOSIS — Z17.0 MALIGNANT NEOPLASM OF OVERLAPPING SITES OF LEFT BREAST IN FEMALE, ESTROGEN RECEPTOR POSITIVE (H): Primary | ICD-10-CM

## 2021-04-11 DIAGNOSIS — C50.812 MALIGNANT NEOPLASM OF OVERLAPPING SITES OF LEFT BREAST IN FEMALE, ESTROGEN RECEPTOR POSITIVE (H): Primary | ICD-10-CM

## 2021-04-12 DIAGNOSIS — C79.51 CARCINOMA OF LEFT BREAST METASTATIC TO BONE (H): ICD-10-CM

## 2021-04-12 DIAGNOSIS — C50.912 CARCINOMA OF LEFT BREAST METASTATIC TO BONE (H): ICD-10-CM

## 2021-04-12 DIAGNOSIS — C50.812 MALIGNANT NEOPLASM OF OVERLAPPING SITES OF LEFT BREAST IN FEMALE, ESTROGEN RECEPTOR POSITIVE (H): Primary | ICD-10-CM

## 2021-04-12 DIAGNOSIS — Z17.0 MALIGNANT NEOPLASM OF OVERLAPPING SITES OF LEFT BREAST IN FEMALE, ESTROGEN RECEPTOR POSITIVE (H): Primary | ICD-10-CM

## 2021-04-12 RX ORDER — ZOLEDRONIC ACID 0.04 MG/ML
4 INJECTION, SOLUTION INTRAVENOUS ONCE
Status: CANCELLED | OUTPATIENT
Start: 2021-04-20 | End: 2021-04-13

## 2021-04-12 RX ORDER — HEPARIN SODIUM,PORCINE 10 UNIT/ML
5 VIAL (ML) INTRAVENOUS
Status: CANCELLED | OUTPATIENT
Start: 2021-04-20

## 2021-04-12 RX ORDER — HEPARIN SODIUM (PORCINE) LOCK FLUSH IV SOLN 100 UNIT/ML 100 UNIT/ML
5 SOLUTION INTRAVENOUS
Status: CANCELLED | OUTPATIENT
Start: 2021-04-20

## 2021-04-15 ENCOUNTER — TELEPHONE (OUTPATIENT)
Dept: ONCOLOGY | Facility: CLINIC | Age: 46
End: 2021-04-15

## 2021-04-15 DIAGNOSIS — C50.912 CARCINOMA OF LEFT BREAST METASTATIC TO BONE (H): ICD-10-CM

## 2021-04-15 DIAGNOSIS — C50.812 MALIGNANT NEOPLASM OF OVERLAPPING SITES OF LEFT BREAST IN FEMALE, ESTROGEN RECEPTOR POSITIVE (H): Primary | ICD-10-CM

## 2021-04-15 DIAGNOSIS — C79.51 CARCINOMA OF LEFT BREAST METASTATIC TO BONE (H): ICD-10-CM

## 2021-04-15 DIAGNOSIS — Z17.0 MALIGNANT NEOPLASM OF OVERLAPPING SITES OF LEFT BREAST IN FEMALE, ESTROGEN RECEPTOR POSITIVE (H): Primary | ICD-10-CM

## 2021-04-15 RX ORDER — ANASTROZOLE 1 MG/1
1 TABLET ORAL DAILY
Qty: 28 TABLET | Refills: 0 | Status: SHIPPED | OUTPATIENT
Start: 2021-04-15 | End: 2021-05-10

## 2021-04-15 NOTE — TELEPHONE ENCOUNTER
PA Initiation    Medication: IBRANCE - PA SUBMITTED  Insurance Company: [employer] - Phone [phone] Fax [phone]  Start Date: 4/15/2021    [facility]   Oncology Pharmacy Liaison  [email or web address]   [phone] (phone)   [phone] (fax)

## 2021-04-19 NOTE — PROGRESS NOTES
Oncology Visit:   Date on this visit: 4/20/2021    Diagnosis:  ER positive left breast cancer metastasized to bones.     Primary Physician: No Ref-Primary, Physician     History Of Present Illness:    Ms. Sanderson is a 45 year old female with a h/o tobacco abuse and DVTs with left breast cancer metastasized to bone. She presented to [place] ED with back pain on 12/5/2020. MRI of the L-spine showed an abnormal L4 lesion with associated right paraspinal mass, abnormalities in L5 and the left iliac bone were also seen. CT C/A/P showed a left breast mass, lytic lesions of T7, L4, and the pelvis, and a 3 cm lesion in the kidney (thought to be a cyst). Ultrasound of the bilateral lower extremities showed a non-occlusive thrombus in the left popliteal vein. Mammogram and ultrasound of the bilateral breasts on 12/17/2020 showed a spiculated mass measuring at least 7.8 cm at 12-1:00 left breast extending from the nipple to 9 cm from the nipple with associated nipple retraction. This mass was biopsied, and showed IDC with surrounding DCIS, grade 3, ER+ 90%, and KS+ 75%.  HER2 was equivocal in approximately 35% of tumor cells by FISH and was negative by IHC.    Metastatic Breast Cancer Treatment:  12/23/2021 - 1/7/2021  Radiation (3000 cGy) to the lumbar spine.  1/29/2021 - present  Ibrance, zoladex, and anastrozole.    Interval History:   Ms. Sanderson was seen in clinic today for metastatic breast cancer follow up.  She reports continuing to feel poorly.  Most bothersome at this time is right leg pain and weakness.  Pain starts in the low back and radiates down the anterior leg.  There is associated weakness and at times the leg gives out on her, making her feel like she is going to fall.  She has been ambulating with a walker.  She has been taking gabapentin 300 mg PO BID and has not noted whether there has been any improvement with this.  She continues to take MS contin 15 mg PO q12 hours and oxycodone as needed.   Lately, she has been taking 2 oxycodone in the morning and 2 in the evening.  She has been taking ibuprofen as well.  She denies new bone or joint aches or pains.  She has intermittent diarrhea for which she takes imodium as needed.  She feels her diarrhea is manageable.  She has had no fevers, chills, or infectious complaints.  She has some SEGOVIA which she attributes to weight gain since starting her breast cancer treatment.  She denies cough or chest pain.  She continues to have headaches.  She has had these for months and feels they are more frequent in recent weeks.  At times of headache, she has associated visual blurriness.  The remainder of a complete 12 point review of systems was reviewed with the patient and was negative with the exception of that mentioned above.    Past Medical/Surgical History:   Past Medical History:   Diagnosis Date     Anxiety      Breast CA (H) 12/2020     Depression      DVT (deep venous thrombosis) (H) 2014     Left breast mass     x approximately 4-5 months     Pulmonary emboli (H)      Pyelonephritis      Schizoaffective disorder (H)      Tobacco use      Past Surgical History:   Procedure Laterality Date     TUBAL LIGATION  1998      No Known Allergies    Current Outpatient Medications   Medication     anastrozole (ARIMIDEX) 1 MG tablet     gabapentin (NEURONTIN) 300 MG capsule     Lidocaine (LIDOCARE) 4 % Patch     methocarbamol (ROBAXIN) 500 MG tablet     morphine (MS CONTIN) 15 MG CR tablet     naloxone (NARCAN) 4 MG/0.1ML nasal spray     nicotine (NICODERM CQ) 14 MG/24HR 24 hr patch     ondansetron (ZOFRAN-ODT) 4 MG ODT tab     oxyCODONE (ROXICODONE) 5 MG tablet     palbociclib (IBRANCE) 125 MG tablet     pantoprazole (PROTONIX) 40 MG EC tablet     polyethylene glycol (MIRALAX) 17 GM/Dose powder     prochlorperazine (COMPAZINE) 10 MG tablet     QUEtiapine (SEROQUEL) 100 MG tablet     rivaroxaban ANTICOAGULANT (XARELTO) 20 MG TABS tablet     sertraline (ZOLOFT) 50 MG tablet      No current facility-administered medications for this visit.    '    Family and Social History:   Please see initial consultation dated 12/22/2020 for further details.    Physical Exam:   General: Adult female in NAD.  Alert and oriented.  Eyes: No erythema or discharge   Respiratory: Breathing comfortably on room air. No wheezing or distress.   Musculoskeletal: Full ROM of the bilateral upper extremities.   Skin: No visible concerning skin rashes or lesions   Neuro: No notable tremor and dyskinetic movements.   Psych: Mood and affect appear normal.     The rest of a comprehensive physical examination is deferred due to PHE (public health emergency) video visit restrictions.     Laboratory/Imaging Studies   4/20/2021 Labs:  Electrolytes, creatinine, and LFTs are wnl.  Albumin is slightly low at 3.2 g/dL    WBC is wnl.  Hemoglobin is slightly low at 11.1 g/dL  Platelets are wnl.    Estradiol is elevated at 32    CEA  2.6 --- 2.2 --- 3.3 --- 3.8 --- 3.5 (1/18/2021 - 4/20/2021)  CA 27.29  84 --- 51 --- 61 --- 65 --- 71 (1/18/2021 - 4/20/2021)    3/21/2021 MRI Right hip:  - small anterior labral tear.  - mild right greater trochanteric bursitis/distal gluteal peritendinitis.    3/21/2021 Lumbar X-ray:  Degenerative narrowing at the L3-4 and L5-S1 interspaces.    3/11/2021 MRI Lumbar spine (I personally reviewed these images):  Multiple enhancing bone lesions c/w metastatic disease.  The lesion at S1 is smaller compared to prior MRI.  Other lesions such as L2 and L3 vertebral bodies are more conspicuous  Mild spinal canal narrowing, mild neural foraminal narrowing at L4-5.  No leptomeningeal enhancement.    12/7/2020 MRI Brain:  Normal brain MRI.    ASSESSMENT/PLAN:   45 year old female with history of DVT with left breast invasive ductal carcinoma, ER/ND positive, HER2 negative metastasized to bones.    1.  Metastatic breast cancer:  On treatment with Ibrance, zoladex, and anastrozole for approximately 2.5 months.   Zoladex was scheduled for 1 week ago, unfortunately, she missed the appointment.  Estradiol level is elevated today c/w missed zoladex injection.  Tumor marker continues to slowly increase.  We discussed breakthrough estradiol limits efficacy of the regimen.  Will administer Zoladex today.  We reviewed option for oophorectomies which would allow to forgo the monthly injection and eliminate concern for breakthrough estradiol elevations.  She is in agreement with referral for oophorectomies, referral placed to gyn/onc to discuss.  Will repeat PET/CT prior to return visit in 1 month.    2.  Bone metastases:  She is s/p XRT to the lumbar spine.  She reports ongoing pain and continues to take MS contin 15 mg PO BID and oxycodone prn.  On Zometa since 1/18/2021 and is receiving once every 3 months.  Will receive Zometa today.    3.  RLE pain:  MRI L-spine images were reviewed with the patient.  Suspect radiculopathy due to metastases/degenerative disease in the low back, most severe at L3.  Previously referred to PT.  Continues on MS contin, oxycodone, robaxin, and gabapentin.  We discussed increasing gabapentin dosing to 600 mg PO qa.m., 300 mg PO at 2:00 p.m., and 600 mg PO at bedtime.  Will also refer to interventional radiology for consideration of vertebroplasty.    4.  DVT:  Recommend continuing Xarelto until contraindicated given metastatic disease.      5.  Hot flashes/anxiety:  Continue Zoloft 50 mg PO daily and Seroquel 100 mg PO at bedtime.  Both medications were refilled today.    6.  Follow Up:  Gyn/onc visit within 1 month to discuss oophorectomies.  IR consultation for vertebroplasty within 1 month.  Labs, visit with Alee and jose miguel 5/19.  PET/CT 1-2 business days prior to return visit with Alee.  Labs and zoladex 6/18.  Labs, visit with me, zoladex and zometa around 7/19.    45 minutes spent on the date of the encounter doing chart review, review of test results, interpretation of tests, patient  visit and documentation

## 2021-04-20 ENCOUNTER — ONCOLOGY VISIT (OUTPATIENT)
Dept: ONCOLOGY | Facility: CLINIC | Age: 46
End: 2021-04-20
Attending: PHYSICIAN ASSISTANT
Payer: COMMERCIAL

## 2021-04-20 ENCOUNTER — INFUSION THERAPY VISIT (OUTPATIENT)
Dept: ONCOLOGY | Facility: CLINIC | Age: 46
End: 2021-04-20
Attending: INTERNAL MEDICINE
Payer: COMMERCIAL

## 2021-04-20 VITALS
HEART RATE: 101 BPM | BODY MASS INDEX: 35.21 KG/M2 | SYSTOLIC BLOOD PRESSURE: 136 MMHG | OXYGEN SATURATION: 96 % | RESPIRATION RATE: 18 BRPM | DIASTOLIC BLOOD PRESSURE: 81 MMHG | HEIGHT: 72 IN | TEMPERATURE: 98.6 F | WEIGHT: 260 LBS

## 2021-04-20 DIAGNOSIS — C79.51 METASTASIS TO BONE (H): ICD-10-CM

## 2021-04-20 DIAGNOSIS — N91.2 AMENORRHEA: ICD-10-CM

## 2021-04-20 DIAGNOSIS — C50.919 MALIGNANT NEOPLASM OF FEMALE BREAST, UNSPECIFIED ESTROGEN RECEPTOR STATUS, UNSPECIFIED LATERALITY, UNSPECIFIED SITE OF BREAST (H): ICD-10-CM

## 2021-04-20 DIAGNOSIS — C50.912 CARCINOMA OF LEFT BREAST METASTATIC TO BONE (H): Primary | ICD-10-CM

## 2021-04-20 DIAGNOSIS — M79.661 PAIN OF RIGHT LOWER LEG: ICD-10-CM

## 2021-04-20 DIAGNOSIS — C79.51 CARCINOMA OF LEFT BREAST METASTATIC TO BONE (H): Primary | ICD-10-CM

## 2021-04-20 DIAGNOSIS — G89.3 CANCER ASSOCIATED PAIN: ICD-10-CM

## 2021-04-20 DIAGNOSIS — N63.0 BREAST MASS: ICD-10-CM

## 2021-04-20 DIAGNOSIS — C79.51 SPINE METASTASIS: ICD-10-CM

## 2021-04-20 DIAGNOSIS — C79.51 CARCINOMA OF LEFT BREAST METASTATIC TO BONE (H): ICD-10-CM

## 2021-04-20 DIAGNOSIS — Z17.0 MALIGNANT NEOPLASM OF OVERLAPPING SITES OF LEFT BREAST IN FEMALE, ESTROGEN RECEPTOR POSITIVE (H): ICD-10-CM

## 2021-04-20 DIAGNOSIS — C50.812 MALIGNANT NEOPLASM OF OVERLAPPING SITES OF LEFT BREAST IN FEMALE, ESTROGEN RECEPTOR POSITIVE (H): ICD-10-CM

## 2021-04-20 DIAGNOSIS — C50.812 MALIGNANT NEOPLASM OF OVERLAPPING SITES OF LEFT BREAST IN FEMALE, ESTROGEN RECEPTOR POSITIVE (H): Primary | ICD-10-CM

## 2021-04-20 DIAGNOSIS — Z17.0 MALIGNANT NEOPLASM OF OVERLAPPING SITES OF LEFT BREAST IN FEMALE, ESTROGEN RECEPTOR POSITIVE (H): Primary | ICD-10-CM

## 2021-04-20 DIAGNOSIS — C50.912 CARCINOMA OF LEFT BREAST METASTATIC TO BONE (H): ICD-10-CM

## 2021-04-20 LAB
ALBUMIN SERPL-MCNC: 3.2 G/DL (ref 3.4–5)
ALP SERPL-CCNC: 85 U/L (ref 40–150)
ALT SERPL W P-5'-P-CCNC: 29 U/L (ref 0–50)
ANION GAP SERPL CALCULATED.3IONS-SCNC: 7 MMOL/L (ref 3–14)
AST SERPL W P-5'-P-CCNC: 10 U/L (ref 0–45)
BASOPHILS # BLD AUTO: 0 10E9/L (ref 0–0.2)
BASOPHILS NFR BLD AUTO: 0 %
BILIRUB SERPL-MCNC: 0.3 MG/DL (ref 0.2–1.3)
BUN SERPL-MCNC: 13 MG/DL (ref 7–30)
CALCIUM SERPL-MCNC: 9.3 MG/DL (ref 8.5–10.1)
CANCER AG27-29 SERPL-ACNC: 71 U/ML (ref 0–39)
CEA SERPL-MCNC: 3.5 UG/L (ref 0–2.5)
CHLORIDE SERPL-SCNC: 107 MMOL/L (ref 94–109)
CO2 SERPL-SCNC: 24 MMOL/L (ref 20–32)
CREAT SERPL-MCNC: 0.8 MG/DL (ref 0.52–1.04)
DIFFERENTIAL METHOD BLD: ABNORMAL
EOSINOPHIL # BLD AUTO: 0 10E9/L (ref 0–0.7)
EOSINOPHIL NFR BLD AUTO: 0 %
ERYTHROCYTE [DISTWIDTH] IN BLOOD BY AUTOMATED COUNT: 14.1 % (ref 10–15)
ESTRADIOL SERPL-MCNC: 32 PG/ML
GFR SERPL CREATININE-BSD FRML MDRD: 88 ML/MIN/{1.73_M2}
GLUCOSE SERPL-MCNC: 163 MG/DL (ref 70–99)
HCT VFR BLD AUTO: 33.1 % (ref 35–47)
HGB BLD-MCNC: 11.1 G/DL (ref 11.7–15.7)
IMM GRANULOCYTES # BLD: 0 10E9/L (ref 0–0.4)
IMM GRANULOCYTES NFR BLD: 0.5 %
LYMPHOCYTES # BLD AUTO: 0.3 10E9/L (ref 0.8–5.3)
LYMPHOCYTES NFR BLD AUTO: 7.2 %
MCH RBC QN AUTO: 32.2 PG (ref 26.5–33)
MCHC RBC AUTO-ENTMCNC: 33.5 G/DL (ref 31.5–36.5)
MCV RBC AUTO: 96 FL (ref 78–100)
MONOCYTES # BLD AUTO: 0.1 10E9/L (ref 0–1.3)
MONOCYTES NFR BLD AUTO: 1.9 %
NEUTROPHILS # BLD AUTO: 3.8 10E9/L (ref 1.6–8.3)
NEUTROPHILS NFR BLD AUTO: 90.4 %
NRBC # BLD AUTO: 0 10*3/UL
NRBC BLD AUTO-RTO: 0 /100
PLATELET # BLD AUTO: 218 10E9/L (ref 150–450)
POTASSIUM SERPL-SCNC: 4 MMOL/L (ref 3.4–5.3)
PROT SERPL-MCNC: 7.4 G/DL (ref 6.8–8.8)
RBC # BLD AUTO: 3.45 10E12/L (ref 3.8–5.2)
SODIUM SERPL-SCNC: 138 MMOL/L (ref 133–144)
WBC # BLD AUTO: 4.2 10E9/L (ref 4–11)

## 2021-04-20 PROCEDURE — 36415 COLL VENOUS BLD VENIPUNCTURE: CPT

## 2021-04-20 PROCEDURE — 96402 CHEMO HORMON ANTINEOPL SQ/IM: CPT

## 2021-04-20 PROCEDURE — G0463 HOSPITAL OUTPT CLINIC VISIT: HCPCS

## 2021-04-20 PROCEDURE — 86300 IMMUNOASSAY TUMOR CA 15-3: CPT | Performed by: INTERNAL MEDICINE

## 2021-04-20 PROCEDURE — 82378 CARCINOEMBRYONIC ANTIGEN: CPT | Performed by: INTERNAL MEDICINE

## 2021-04-20 PROCEDURE — 96374 THER/PROPH/DIAG INJ IV PUSH: CPT

## 2021-04-20 PROCEDURE — 250N000011 HC RX IP 250 OP 636: Performed by: INTERNAL MEDICINE

## 2021-04-20 PROCEDURE — 80053 COMPREHEN METABOLIC PANEL: CPT | Performed by: INTERNAL MEDICINE

## 2021-04-20 PROCEDURE — 82670 ASSAY OF TOTAL ESTRADIOL: CPT | Performed by: INTERNAL MEDICINE

## 2021-04-20 PROCEDURE — 99215 OFFICE O/P EST HI 40 MIN: CPT | Performed by: INTERNAL MEDICINE

## 2021-04-20 PROCEDURE — 85025 COMPLETE CBC W/AUTO DIFF WBC: CPT | Performed by: INTERNAL MEDICINE

## 2021-04-20 RX ORDER — ZOLEDRONIC ACID 0.04 MG/ML
4 INJECTION, SOLUTION INTRAVENOUS ONCE
Status: COMPLETED | OUTPATIENT
Start: 2021-04-20 | End: 2021-04-20

## 2021-04-20 RX ORDER — OXYCODONE HYDROCHLORIDE 5 MG/1
5-10 TABLET ORAL
Qty: 56 TABLET | Refills: 0 | Status: SHIPPED | OUTPATIENT
Start: 2021-04-20 | End: 2021-05-10

## 2021-04-20 RX ORDER — GABAPENTIN 300 MG/1
CAPSULE ORAL
Qty: 150 CAPSULE | Refills: 0 | Status: SHIPPED | OUTPATIENT
Start: 2021-04-20 | End: 2021-05-10

## 2021-04-20 RX ORDER — MORPHINE SULFATE 15 MG/1
15 TABLET, FILM COATED, EXTENDED RELEASE ORAL EVERY 12 HOURS
Qty: 60 TABLET | Refills: 0 | Status: SHIPPED | OUTPATIENT
Start: 2021-04-20 | End: 2021-06-01

## 2021-04-20 RX ADMIN — GOSERELIN ACETATE 3.6 MG: 3.6 IMPLANT SUBCUTANEOUS at 11:33

## 2021-04-20 RX ADMIN — ZOLEDRONIC ACID 4 MG: 0.04 INJECTION, SOLUTION INTRAVENOUS at 11:30

## 2021-04-20 ASSESSMENT — PAIN SCALES - GENERAL: PAINLEVEL: EXTREME PAIN (8)

## 2021-04-20 ASSESSMENT — MIFFLIN-ST. JEOR: SCORE: 1936.35

## 2021-04-20 NOTE — NURSING NOTE
Oncology Rooming Note    April 20, 2021 10:19 AM   Teresa Sanderson is a 45 year old female who presents for:    Chief Complaint   Patient presents with     Blood Draw     Labs drawn via  by RN in lab. VS taken.     Oncology Clinic Visit     Return: Malignant neoplasm of female breast, unspecified estrogen receptor status, unspecified laterality, unspecified site of breast     Initial Vitals: /81 (BP Location: Right arm, Patient Position: Sitting, Cuff Size: Adult Regular)   Pulse 101   Temp 98.6  F (37  C) (Oral)   Resp 18   Ht 1.829 m (6')   Wt 117.9 kg (260 lb)   SpO2 96%   BMI 35.26 kg/m   Estimated body mass index is 35.26 kg/m  as calculated from the following:    Height as of this encounter: 1.829 m (6').    Weight as of this encounter: 117.9 kg (260 lb). Body surface area is 2.45 meters squared.  Extreme Pain (8) Comment: Data Unavailable   No LMP recorded.  Allergies reviewed: Yes  Medications reviewed: Yes    Medications: Medication refills not needed today.  Pharmacy name entered into EPIC:    [facility] - [place], [place] - [id number]  [facility] [place] - [address] [facility] [place] [address] [id number] - [place] - [address] [facility] [address] [facility] - [place] [place] - [facility]    Clinical concerns: N/A      Smitha De Dios CMA

## 2021-04-20 NOTE — LETTER
4/20/2021         RE: Teresa Sanderson  [address] [facility] [address]        Dear Colleague,    Thank you for referring your patient, Teresa Sanderson, to the [facility]. Please see a copy of my visit note below.      Oncology Visit:   Date on this visit: 4/20/2021    Diagnosis:  ER positive left breast cancer metastasized to bones.     Primary Physician: No Ref-Primary, Physician     History Of Present Illness:    Ms. Sanderson is a 45 year old female with a h/o tobacco abuse and DVTs with left breast cancer metastasized to bone. She presented to [place] ED with back pain on 12/5/2020. MRI of the L-spine showed an abnormal L4 lesion with associated right paraspinal mass, abnormalities in L5 and the left iliac bone were also seen. CT C/A/P showed a left breast mass, lytic lesions of T7, L4, and the pelvis, and a 3 cm lesion in the kidney (thought to be a cyst). Ultrasound of the bilateral lower extremities showed a non-occlusive thrombus in the left popliteal vein. Mammogram and ultrasound of the bilateral breasts on 12/17/2020 showed a spiculated mass measuring at least 7.8 cm at 12-1:00 left breast extending from the nipple to 9 cm from the nipple with associated nipple retraction. This mass was biopsied, and showed IDC with surrounding DCIS, grade 3, ER+ 90%, and NE+ 75%.  HER2 was equivocal in approximately 35% of tumor cells by FISH and was negative by IHC.    Metastatic Breast Cancer Treatment:  12/23/2021 - 1/7/2021  Radiation (3000 cGy) to the lumbar spine.  1/29/2021 - present  Ibrance, zoladex, and anastrozole.    Interval History:   Ms. Sanderson was seen in clinic today for metastatic breast cancer follow up.  She reports continuing to feel poorly.  Most bothersome at this time is right leg pain and weakness.  Pain starts in the low back and radiates down the anterior leg.  There is associated weakness and at times the leg gives out on her, making her feel  like she is going to fall.  She has been ambulating with a walker.  She has been taking gabapentin 300 mg PO BID and has not noted whether there has been any improvement with this.  She continues to take MS contin 15 mg PO q12 hours and oxycodone as needed.  Lately, she has been taking 2 oxycodone in the morning and 2 in the evening.  She has been taking ibuprofen as well.  She denies new bone or joint aches or pains.  She has intermittent diarrhea for which she takes imodium as needed.  She feels her diarrhea is manageable.  She has had no fevers, chills, or infectious complaints.  She has some SEGOVIA which she attributes to weight gain since starting her breast cancer treatment.  She denies cough or chest pain.  She continues to have headaches.  She has had these for months and feels they are more frequent in recent weeks.  At times of headache, she has associated visual blurriness.  The remainder of a complete 12 point review of systems was reviewed with the patient and was negative with the exception of that mentioned above.    Past Medical/Surgical History:   Past Medical History:   Diagnosis Date     Anxiety      Breast CA (H) 12/2020     Depression      DVT (deep venous thrombosis) (H) 2014     Left breast mass     x approximately 4-5 months     Pulmonary emboli (H)      Pyelonephritis      Schizoaffective disorder (H)      Tobacco use      Past Surgical History:   Procedure Laterality Date     TUBAL LIGATION  1998      No Known Allergies    Current Outpatient Medications   Medication     anastrozole (ARIMIDEX) 1 MG tablet     gabapentin (NEURONTIN) 300 MG capsule     Lidocaine (LIDOCARE) 4 % Patch     methocarbamol (ROBAXIN) 500 MG tablet     morphine (MS CONTIN) 15 MG CR tablet     naloxone (NARCAN) 4 MG/0.1ML nasal spray     nicotine (NICODERM CQ) 14 MG/24HR 24 hr patch     ondansetron (ZOFRAN-ODT) 4 MG ODT tab     oxyCODONE (ROXICODONE) 5 MG tablet     palbociclib (IBRANCE) 125 MG tablet     pantoprazole  (PROTONIX) 40 MG EC tablet     polyethylene glycol (MIRALAX) 17 GM/Dose powder     prochlorperazine (COMPAZINE) 10 MG tablet     QUEtiapine (SEROQUEL) 100 MG tablet     rivaroxaban ANTICOAGULANT (XARELTO) 20 MG TABS tablet     sertraline (ZOLOFT) 50 MG tablet     No current facility-administered medications for this visit.    '    Family and Social History:   Please see initial consultation dated 12/22/2020 for further details.    Physical Exam:   General: Adult female in NAD.  Alert and oriented.  Eyes: No erythema or discharge   Respiratory: Breathing comfortably on room air. No wheezing or distress.   Musculoskeletal: Full ROM of the bilateral upper extremities.   Skin: No visible concerning skin rashes or lesions   Neuro: No notable tremor and dyskinetic movements.   Psych: Mood and affect appear normal.     The rest of a comprehensive physical examination is deferred due to PHE (public health emergency) video visit restrictions.     Laboratory/Imaging Studies   4/20/2021 Labs:  Electrolytes, creatinine, and LFTs are wnl.  Albumin is slightly low at 3.2 g/dL    WBC is wnl.  Hemoglobin is slightly low at 11.1 g/dL  Platelets are wnl.    Estradiol is elevated at 32    CEA  2.6 --- 2.2 --- 3.3 --- 3.8 --- 3.5 (1/18/2021 - 4/20/2021)  CA 27.29  84 --- 51 --- 61 --- 65 --- 71 (1/18/2021 - 4/20/2021)    3/21/2021 MRI Right hip:  - small anterior labral tear.  - mild right greater trochanteric bursitis/distal gluteal peritendinitis.    3/21/2021 Lumbar X-ray:  Degenerative narrowing at the L3-4 and L5-S1 interspaces.    3/11/2021 MRI Lumbar spine (I personally reviewed these images):  Multiple enhancing bone lesions c/w metastatic disease.  The lesion at S1 is smaller compared to prior MRI.  Other lesions such as L2 and L3 vertebral bodies are more conspicuous  Mild spinal canal narrowing, mild neural foraminal narrowing at L4-5.  No leptomeningeal enhancement.    12/7/2020 MRI Brain:  Normal brain  MRI.    ASSESSMENT/PLAN:   45 year old female with history of DVT with left breast invasive ductal carcinoma, ER/VA positive, HER2 negative metastasized to bones.    1.  Metastatic breast cancer:  On treatment with Ibrance, zoladex, and anastrozole for approximately 2.5 months.  Zoladex was scheduled for 1 week ago, unfortunately, she missed the appointment.  Estradiol level is elevated today c/w missed zoladex injection.  Tumor marker continues to slowly increase.  We discussed breakthrough estradiol limits efficacy of the regimen.  Will administer Zoladex today.  We reviewed option for oophorectomies which would allow to forgo the monthly injection and eliminate concern for breakthrough estradiol elevations.  She is in agreement with referral for oophorectomies, referral placed to gyn/onc to discuss.  Will repeat PET/CT prior to return visit in 1 month.    2.  Bone metastases:  She is s/p XRT to the lumbar spine.  She reports ongoing pain and continues to take MS contin 15 mg PO BID and oxycodone prn.  On Zometa since 1/18/2021 and is receiving once every 3 months.  Will receive Zometa today.    3.  RLE pain:  MRI L-spine images were reviewed with the patient.  Suspect radiculopathy due to metastases/degenerative disease in the low back, most severe at L3.  Previously referred to PT.  Continues on MS contin, oxycodone, robaxin, and gabapentin.  We discussed increasing gabapentin dosing to 600 mg PO qa.m., 300 mg PO at 2:00 p.m., and 600 mg PO at bedtime.  Will also refer to interventional radiology for consideration of vertebroplasty.    4.  DVT:  Recommend continuing Xarelto until contraindicated given metastatic disease.      5.  Hot flashes/anxiety:  Continue Zoloft 50 mg PO daily and Seroquel 100 mg PO at bedtime.  Both medications were refilled today.    6.  Follow Up:  Gyn/onc visit within 1 month to discuss oophorectomies.  IR consultation for vertebroplasty within 1 month.  Labs, visit with Alee mendoza  zoladex 5/19.  PET/CT 1-2 business days prior to return visit with Alee.  Labs and zoladex 6/18.  Labs, visit with me, zoladex and zometa around 7/19.    45 minutes spent on the date of the encounter doing chart review, review of test results, interpretation of tests, patient visit and documentation         Again, thank you for allowing me to participate in the care of your patient.        Sincerely,        Jahaira Plunkett MD

## 2021-04-20 NOTE — NURSING NOTE
Chief Complaint   Patient presents with     Blood Draw     Labs drawn via  by RN in lab. VS taken.     Labs collected from venipuncture by RN. Vitals taken. Checked in for appointment(s).    Lili Taylor RN

## 2021-04-20 NOTE — PROGRESS NOTES
Infusion Nursing Note:  Teresa Sanderson presents today for Zoladex-Zometa.    Patient seen by provider today: Yes: Dr Plunkett   present during visit today: Not Applicable.    Note: Pt saw provider prior to infusion, ok for treament.      Intravenous Access:  Peripheral IV placed.    Treatment Conditions:  Lab Results   Component Value Date    HGB 11.1 04/20/2021     Lab Results   Component Value Date    WBC 4.2 04/20/2021      Lab Results   Component Value Date    ANEU 3.8 04/20/2021     Lab Results   Component Value Date     04/20/2021      Lab Results   Component Value Date     04/20/2021                   Lab Results   Component Value Date    POTASSIUM 4.0 04/20/2021           No results found for: MAG         Lab Results   Component Value Date    CR 0.80 04/20/2021                   Lab Results   Component Value Date    NANDO 9.3 04/20/2021                Lab Results   Component Value Date    BILITOTAL 0.3 04/20/2021           Lab Results   Component Value Date    ALBUMIN 3.2 04/20/2021                    Lab Results   Component Value Date    ALT 29 04/20/2021           Lab Results   Component Value Date    AST 10 04/20/2021           Post Infusion Assessment:  Patient tolerated infusion without incident.  Patient tolerated injection into RUQ of abdomen without incident.  Ice applied to site prior to injection.  Blood return noted pre and post infusion.  Site patent and intact, free from redness, edema or discomfort.  No evidence of extravasations.  Access discontinued per protocol.   IB to Dr Plunkett if pt should be taking Calcium/Vitamin D Supplements.  Miranda Cunha, RNCC will follow up with pt.    Discharge Plan:   Patient declined prescription refills.  Discharge instructions reviewed with: Patient.  Patient and/or family verbalized understanding of discharge instructions and all questions answered.  Copy of AVS reviewed with patient and/or family.  Checkout orders not done prior to  discharge.    Patient discharged in stable condition accompanied by: self.  Departure Mode: Ambulatory with walker.    Anh Park RN

## 2021-04-22 ENCOUNTER — TELEPHONE (OUTPATIENT)
Dept: INTERVENTIONAL RADIOLOGY/VASCULAR | Facility: CLINIC | Age: 46
End: 2021-04-22

## 2021-04-22 NOTE — TELEPHONE ENCOUNTER
Spoke with patient regarding appointment needing to be scheduled with  in person visit Monday 4/26.    Patient stated they could come in 4/26 at 9:40 for appointment.    Patient is aware of date,time, and location of appointment.    Patient had no further questions at this time.

## 2021-04-23 NOTE — TELEPHONE ENCOUNTER
DIAGNOSIS: bone/met vertebroplasty referred by    DATE RECEIVED: 4.26.21   NOTES STATUS DETAILS   OFFICE NOTE from referring provider Internal 4.20.20, 1.12.21  Dr. Jahaira Plunkett  [facility]   OFFICE NOTE from other specialist Internal 3.21-3.24.21  Dr. Ady Robbins  [facility]   OPERATIVE REPORT na    MEDICATION LIST Internal    PERTINENT LABS Internal    CTA (CT ANGIOGRAPHY) na    CT na    MRI Internal 3.21.21  MR Hip Right    3.11.21  MR Lumbar Spine   ULTRASOUND na

## 2021-04-26 ENCOUNTER — ANCILLARY PROCEDURE (OUTPATIENT)
Dept: GENERAL RADIOLOGY | Facility: CLINIC | Age: 46
End: 2021-04-26
Attending: RADIOLOGY
Payer: COMMERCIAL

## 2021-04-26 ENCOUNTER — OFFICE VISIT (OUTPATIENT)
Dept: VASCULAR SURGERY | Facility: CLINIC | Age: 46
End: 2021-04-26
Payer: COMMERCIAL

## 2021-04-26 ENCOUNTER — PRE VISIT (OUTPATIENT)
Dept: VASCULAR SURGERY | Facility: CLINIC | Age: 46
End: 2021-04-26

## 2021-04-26 VITALS — SYSTOLIC BLOOD PRESSURE: 130 MMHG | HEART RATE: 82 BPM | OXYGEN SATURATION: 96 % | DIASTOLIC BLOOD PRESSURE: 92 MMHG

## 2021-04-26 DIAGNOSIS — C50.912 CARCINOMA OF LEFT BREAST METASTATIC TO BONE (H): ICD-10-CM

## 2021-04-26 DIAGNOSIS — Z11.59 ENCOUNTER FOR SCREENING FOR OTHER VIRAL DISEASES: Primary | ICD-10-CM

## 2021-04-26 DIAGNOSIS — C79.51 CARCINOMA OF LEFT BREAST METASTATIC TO BONE (H): ICD-10-CM

## 2021-04-26 DIAGNOSIS — C50.912 CARCINOMA OF LEFT BREAST METASTATIC TO BONE (H): Primary | ICD-10-CM

## 2021-04-26 DIAGNOSIS — C79.51 CARCINOMA OF LEFT BREAST METASTATIC TO BONE (H): Primary | ICD-10-CM

## 2021-04-26 PROCEDURE — 99417 PROLNG OP E/M EACH 15 MIN: CPT | Mod: GC | Performed by: RADIOLOGY

## 2021-04-26 PROCEDURE — 76000 FLUOROSCOPY <1 HR PHYS/QHP: CPT | Performed by: RADIOLOGY

## 2021-04-26 PROCEDURE — 99205 OFFICE O/P NEW HI 60 MIN: CPT | Mod: GC | Performed by: RADIOLOGY

## 2021-04-26 ASSESSMENT — PAIN SCALES - GENERAL: PAINLEVEL: EXTREME PAIN (8)

## 2021-04-26 NOTE — PROGRESS NOTES
INTERVENTIONAL RADIOLOGY CONSULTATION    Name: Teresa Sanderson  Age: 45 year old   Referring Physician: Dr. Strong   REASON FOR REFERRAL: Painful bony metastases    HPI: Ms. Sanderson is a very pleasant 45-year-old female with history of ER positive left breast cancer with bony metastases.  His past medical history is also significant for tobacco use and DVTs.  Pain from bony metastases were presenting symptom of her cancer.  She presented to [place] ED with back pain in December 2020.  MRI of the lumbar spine demonstrated a large L4 lesion with associated paraspinal mass.  Also noted where bone lesions involving left iliac bone, lytic lesions involving T7, and pelvic bones.  She did undergo radiation to the lumbar spine from 12/23/2020 through 1/7/2021.  She has been on Ibrance, zoladex, and anastrozole since 1/29/2021.  She is here today for discussion of her painful bony metastases in the lower back.  The pain is quite debilitating for her with nearly any daily activities.  She has significantly reduced pain when she is in bed or sitting still.  Her pain can be as bad as 10 out of 10.  It is rather tender to palpation in her lower back.  She does report some right leg pain as well as a very severe focal tenderness in the lower back.  She denies having any weakness or paresthesia.  Her symptoms have been gradually getting worse over time.  The main symptom bothersome for her at least during our interview remains the focal back pain.  She does not suffer from any incontinence.  She has been taking gabapentin 300 mg PO BID and has not noted whether there has been any improvement with this.  She continues to take MS contin 15 mg PO q12 hours and oxycodone as needed.  Lately, she has been taking 2 oxycodone in the morning and 2 in the evening.    PAST MEDICAL HISTORY:   Past Medical History:   Diagnosis Date     Anxiety      Breast CA (H) 12/2020     Depression      DVT (deep venous thrombosis) (H) 2014      Left breast mass     x approximately 4-5 months     Pulmonary emboli (H)      Pyelonephritis      Schizoaffective disorder (H)      Tobacco use        PAST SURGICAL HISTORY:   Past Surgical History:   Procedure Laterality Date     TUBAL LIGATION  1998       FAMILY HISTORY:   Family History   Problem Relation Age of Onset     Lung Cancer Mother      Lung Cancer Father      Lupus Brother      Hypertension Other      Diabetes Other        SOCIAL HISTORY:   Social History     Tobacco Use     Smoking status: Current Every Day Smoker     Packs/day: 0.25     Types: Cigarettes     Smokeless tobacco: Never Used   Substance Use Topics     Alcohol use: Not Currently     Comment: occ       PROBLEM LIST:   Patient Active Problem List    Diagnosis Date Noted     Metastasis to bone (H) 03/21/2021     Priority: Medium     Pain of right lower leg 03/21/2021     Priority: Medium     Malignant neoplasm of female breast, unspecified estrogen receptor status, unspecified laterality, unspecified site of breast (H) 03/21/2021     Priority: Medium     Malignant neoplasm of overlapping sites of left breast in female, estrogen receptor positive (H) 01/10/2021     Priority: Medium     Carcinoma of left breast metastatic to bone (H) 01/10/2021     Priority: Medium     Breast mass 12/05/2020     Priority: Medium     Spine metastasis (H) 12/05/2020     Priority: Medium     Urinary tract infection with hematuria 12/05/2020     Priority: Medium       MEDICATIONS:   Prescription Medications as of 4/26/2021       Rx Number Disp Refills Start End Last Dispensed Date Next Fill Date Owning Pharmacy    anastrozole (ARIMIDEX) 1 MG tablet  28 tablet 0 4/15/2021 5/13/2021   [facility] - [place], [place] - [address]    Sig: Take 1 tablet (1 mg) by mouth daily for 28 days    Class: E-Prescribe    Route: Oral    Non-formulary Exception Code: Specific indication for non-formulary alternative    gabapentin (NEURONTIN) 300 MG capsule   150 capsule 0 4/20/2021 5/20/2021   [address]    Sig: Take 2 capsules (600 mg) by mouth every morning AND 1 capsule (300 mg) daily at 2 pm AND 2 capsules (600 mg) At Bedtime.    Class: E-Prescribe    Route: Oral    Lidocaine (LIDOCARE) 4 % Patch  30 patch 1 12/10/2020    [address]    Sig: Place 1-2 patches onto the skin every 24 hours To prevent lidocaine toxicity, patient should be patch free for 12 hrs daily.    Class: E-Prescribe    Route: Transdermal    methocarbamol (ROBAXIN) 500 MG tablet  120 tablet 0 3/24/2021    [id number] - [place] [address] [address] [facility] [address] [facility] [facility] [address]    Sig: Take 1 tablet (500 mg) by mouth 4 times daily    Class: Local Print    Route: Oral    morphine (MS CONTIN) 15 MG CR tablet  60 tablet 0 4/20/2021 5/20/2021   [address]    Sig: Take 1 tablet (15 mg) by mouth every 12 hours    Class: E-Prescribe    Earliest Fill Date: 4/20/2021    Route: Oral    naloxone (NARCAN) 4 MG/0.1ML nasal spray  0.2 mL 0 3/3/2021    [address]    Sig: Spray 1 spray (4 mg) into one nostril alternating nostrils once as needed for opioid reversal every 2-3 minutes until assistance arrives    Class: E-Prescribe    Route: Alternating Nostrils    nicotine (NICODERM CQ) 14 MG/24HR 24 hr patch  30 patch 1 12/11/2020    [address]    Sig: Place 1 patch onto the skin daily    Class: E-Prescribe    Route: Transdermal    ondansetron (ZOFRAN-ODT) 4 MG ODT tab  120 tablet 0 12/10/2020    [address]    Sig: Take 1 tablet (4 mg) by mouth every 6 hours as needed for nausea or vomiting     Class: E-Prescribe    Notes to Pharmacy: Please take upto 6 hours daily as needed for nausea    Route: Oral    oxyCODONE (ROXICODONE) 5 MG tablet  56 tablet 0 4/20/2021    [address]    Sig: Take 1-2 tablets (5-10 mg) by mouth every 3 hours as needed for moderate to severe pain    Class: E-Prescribe    Earliest Fill Date: 4/20/2021    Route: Oral    palbociclib (IBRANCE) 125 MG tablet  21 tablet 0 4/15/2021    [facility] [address]    Sig: Take 1 tablet (125 mg) by mouth daily Take for 21 days, then 7 days off.    Class: E-Prescribe    Route: Oral    Non-formulary Exception Code: Specific indication for non-formulary alternative    pantoprazole (PROTONIX) 40 MG EC tablet  30 tablet 1 3/16/2021    [facility] #[id number] - [place] [address] [address]    Sig: Take 1 tablet (40 mg) by mouth every morning (before breakfast)    Class: E-Prescribe    Route: Oral    polyethylene glycol (MIRALAX) 17 GM/Dose powder  510 g 1 3/3/2021    [address]    Sig: Take 17 g by mouth daily    Class: E-Prescribe    Route: Oral    prochlorperazine (COMPAZINE) 10 MG tablet  30 tablet 2 1/18/2021    [address]    Sig: Take 1 tablet (10 mg) by mouth every 6 hours as needed (Nausea/Vomiting)    Class: E-Prescribe    Route: Oral    Non-formulary Exception Code: Specific indication for non-formulary alternative    QUEtiapine (SEROQUEL) 100 MG tablet  30 tablet 1 3/16/2021    [facility] #[id number] - [facility] [address]    Sig: Take 1 tablet (100 mg) by mouth At Bedtime    Class: E-Prescribe    Route: Oral    rivaroxaban ANTICOAGULANT (XARELTO) 20 MG TABS tablet  30 tablet 11  3/3/2021    [facility] [place], [place] - [phone]    Sig: Take 1 tablet (20 mg) by mouth daily (with dinner)    Class: E-Prescribe    Route: Oral    sertraline (ZOLOFT) 50 MG tablet  30 tablet 1 3/16/2021    [facility][id number] - [place], [place] - [address] AT [facility] [address]    Sig: Take 1 tablet (50 mg) by mouth daily    Class: E-Prescribe    Route: Oral          ALLERGIES:   Patient has no known allergies.    ROS:  An 11 point review of system was performed and pertinent negative and positives are mentioned in HPI.        Physical Examination:   VITALS:   There were no vitals taken for this visit.  General:  Pt sitting in chair comfortably.  No acute distress  HEENT: normocephalic.  Neck supple  Neck: no JVD  Resp: nonlabored.  CTAb  CV: RRR.  S1 & S2.  No rub  Abd: nontender, soft, normoactive bowel sounds  Lymph: no pedal edema  Neuro: Oriented x3.  No evidence of focal motor deficits.  Motor exam is symmetrical and 5 out of 5 bilaterally.  She has some voluntary guarding.  Sensory exam is intact bilaterally.  Patient has exquisite tenderness upon palpation of her lower back.  This was localized using fluoroscopy and the point of maximum tenderness is on L4 spinous process.  Mood: appropriate affect      Labs:    BMP RESULTS:  Lab Results   Component Value Date     04/20/2021    POTASSIUM 4.0 04/20/2021    CHLORIDE 107 04/20/2021    CO2 24 04/20/2021    ANIONGAP 7 04/20/2021     (H) 04/20/2021    BUN 13 04/20/2021    CR 0.80 04/20/2021    GFRESTIMATED 88 04/20/2021    GFRESTBLACK >90 04/20/2021    NANDO 9.3 04/20/2021        CBC RESULTS:  Lab Results   Component Value Date    WBC 4.2 04/20/2021    RBC 3.45 (L) 04/20/2021    HGB 11.1 (L) 04/20/2021    HCT 33.1 (L) 04/20/2021    MCV 96 04/20/2021    MCH 32.2 04/20/2021    MCHC 33.5 04/20/2021    RDW 14.1 04/20/2021     04/20/2021       INR/PTT:  Lab Results    Component Value Date    INR 0.98 03/21/2021    PTT 29 12/06/2020       Diagnostic studies: Fluoroscopy exam performed on the day of clinic visit reveals point of maximum tenderness to be over the L4 spinous process.    Lumbar spine MRI dated 3/11/2021 reveals involvement of L2 and L3, increased since comparison study as well as S1 level.  The most significant metastatic deposit is within L4 vertebral body with extension to the right paraspinous tissue.  There is mild compression deformity, mostly involving the superior endplate and minimal neuroforaminal narrowing.  There is no retropulsion or erosion to the posterior wall of vertebral body.  No significant spinal canal narrowing from the static lesion. Postradiation changes are also evident.    Assessment 45-year-old female with polymetastatic ER positive breast cancer, predominantly involving axial skeleton, with the most prominent deposit involving L4 vertebral body, causing severe lifestyle limiting pain.  Patient does have some radiculopathy as well as extension to the right paraspinous tissue, however significant part of her metastatic focus remains contained within the L4 vertebral body without retropulsion or spinal canal narrowing.  This patient has received proper treatment with radiation and her pain remains uncontrolled.  I do believe she would be a good candidate for vertebral augmentation (kyphoplasty) given compression deformity as well as radiofrequency ablation of the metastatic focus. We did discuss the risk and benefits of this procedure including but not limited to lack of alleviation of pain, neurological complications, hemorrhagic complications or infection.  She expressed understanding and all her questions were answered.  She desires to proceed with the treatment.      Plan  1.  Plan for kyphoplasty and radiofrequency ablation of L4 vertebral body metastatic lesion under general anesthesia in a biplanar room.  2.  PACs visit prior to  general anesthesia.    It was a pleasure to participate in Ms. Sanderson care today.    I, Dr Santo Wiggins, was present for the entirety of this office visit and personally performed the history and exam. I have documented the findings as well as the assessment and plan.    A total of 100 minutes was spent on this clinic visit, more than half was on direct counseling and coordination of the care.    Santo Wiggins MD    Vascular and Interventional Radiolgy  [facility]          CC  Patient Care Team:  No Ref-Primary, Physician as PCP - General  Jahaira Plunkett MD as MD (Hematology & Oncology)  Viry Reyes PA-C as Referring Physician (Hematology & Oncology)  Faby Cleveland APRN CNM as Certified Nurse Midwife (OB/Gyn)  Manju Chaudhari MD as Assigned OBGYN Provider  Jahaira Plunkett MD as Assigned Cancer Care Provider  ENEIDA CHACON

## 2021-04-26 NOTE — NURSING NOTE
Vascular Rooming Note     Teresa Sanderson's goals for this visit include:   Chief Complaint   Patient presents with     Consult     Teresa, is being seen today for a consult regarding bone/met vertbroplasty referred by , energy is inconsistant,  back is weakness and pain as well as right groin to the foot with numbness, as reported by patient.     Nesha Jimenez LPN

## 2021-04-26 NOTE — LETTER
4/26/2021       RE: Teresa Sanderson  [address] [facility] [address]     Dear Colleague,    Thank you for referring your patient, Teresa Sanderson, to the [facility] [place] at [facility]. Please see a copy of my visit note below.        INTERVENTIONAL RADIOLOGY CONSULTATION    Name: Teresa Sanderson  Age: 45 year old   Referring Physician: Dr. Strong   REASON FOR REFERRAL: Painful bony metastases    HPI: Ms. Sanderson is a very pleasant 45-year-old female with history of ER positive left breast cancer with bony metastases.  His past medical history is also significant for tobacco use and DVTs.  Pain from bony metastases were presenting symptom of her cancer.  She presented to [place] ED with back pain in December 2020.  MRI of the lumbar spine demonstrated a large L4 lesion with associated paraspinal mass.  Also noted where bone lesions involving left iliac bone, lytic lesions involving T7, and pelvic bones.  She did undergo radiation to the lumbar spine from 12/23/2020 through 1/7/2021.  She has been on Ibrance, zoladex, and anastrozole since 1/29/2021.  She is here today for discussion of her painful bony metastases in the lower back.  The pain is quite debilitating for her with nearly any daily activities.  She has significantly reduced pain when she is in bed or sitting still.  Her pain can be as bad as 10 out of 10.  It is rather tender to palpation in her lower back.  She does report some right leg pain as well as a very severe focal tenderness in the lower back.  She denies having any weakness or paresthesia.  Her symptoms have been gradually getting worse over time.  The main symptom bothersome for her at least during our interview remains the focal back pain.  She does not suffer from any incontinence.  She has been taking gabapentin 300 mg PO BID and has not noted whether there has been any improvement with this.  She  continues to take MS contin 15 mg PO q12 hours and oxycodone as needed.  Lately, she has been taking 2 oxycodone in the morning and 2 in the evening.    PAST MEDICAL HISTORY:   Past Medical History:   Diagnosis Date     Anxiety      Breast CA (H) 12/2020     Depression      DVT (deep venous thrombosis) (H) 2014     Left breast mass     x approximately 4-5 months     Pulmonary emboli (H)      Pyelonephritis      Schizoaffective disorder (H)      Tobacco use        PAST SURGICAL HISTORY:   Past Surgical History:   Procedure Laterality Date     TUBAL LIGATION  1998       FAMILY HISTORY:   Family History   Problem Relation Age of Onset     Lung Cancer Mother      Lung Cancer Father      Lupus Brother      Hypertension Other      Diabetes Other        SOCIAL HISTORY:   Social History     Tobacco Use     Smoking status: Current Every Day Smoker     Packs/day: 0.25     Types: Cigarettes     Smokeless tobacco: Never Used   Substance Use Topics     Alcohol use: Not Currently     Comment: occ       PROBLEM LIST:   Patient Active Problem List    Diagnosis Date Noted     Metastasis to bone (H) 03/21/2021     Priority: Medium     Pain of right lower leg 03/21/2021     Priority: Medium     Malignant neoplasm of female breast, unspecified estrogen receptor status, unspecified laterality, unspecified site of breast (H) 03/21/2021     Priority: Medium     Malignant neoplasm of overlapping sites of left breast in female, estrogen receptor positive (H) 01/10/2021     Priority: Medium     Carcinoma of left breast metastatic to bone (H) 01/10/2021     Priority: Medium     Breast mass 12/05/2020     Priority: Medium     Spine metastasis (H) 12/05/2020     Priority: Medium     Urinary tract infection with hematuria 12/05/2020     Priority: Medium       MEDICATIONS:   Prescription Medications as of 4/26/2021       Rx Number Disp Refills Start End Last Dispensed Date Next Fill Date Owning Pharmacy    anastrozole (ARIMIDEX) 1 MG tablet  28  tablet 0 4/15/2021 5/13/2021   [facility] [address] [place] [address]    Sig: Take 1 tablet (1 mg) by mouth daily for 28 days    Class: E-Prescribe    Route: Oral    Non-formulary Exception Code: Specific indication for non-formulary alternative    gabapentin (NEURONTIN) 300 MG capsule  150 capsule 0 4/20/2021 5/20/2021   [address] 1[address]835    Sig: Take 2 capsules (600 mg) by mouth every morning AND 1 capsule (300 mg) daily at 2 pm AND 2 capsules (600 mg) At Bedtime.    Class: E-Prescribe    Route: Oral    Lidocaine (LIDOCARE) 4 % Patch  30 patch 1 12/10/2020    [address]    Sig: Place 1-2 patches onto the skin every 24 hours To prevent lidocaine toxicity, patient should be patch free for 12 hrs daily.    Class: E-Prescribe    Route: Transdermal    methocarbamol (ROBAXIN) 500 MG tablet  120 tablet 0 3/24/2021    [facility][id number] - [facility] [address] AT [facility] [address] [facility] [address] [facility] [address]    Sig: Take 1 tablet (500 mg) by mouth 4 times daily    Class: Local Print    Route: Oral    morphine (MS CONTIN) 15 MG CR tablet  60 tablet 0 4/20/2021 5/20/2021   [address]    Sig: Take 1 tablet (15 mg) by mouth every 12 hours    Class: E-Prescribe    Earliest Fill Date: 4/20/2021    Route: Oral    naloxone (NARCAN) 4 MG/0.1ML nasal spray  0.2 mL 0 3/3/2021    [address] 1[address]045    Sig: Spray 1 spray (4 mg) into one nostril alternating nostrils once as needed for opioid reversal every 2-3 minutes until assistance arrives    Class: E-Prescribe    Route: Alternating Nostrils    nicotine (NICODERM CQ) 14 MG/24HR 24 hr patch  30 patch 1 12/11/2020    [address]     Sig: Place 1 patch onto the skin daily    Class: E-Prescribe    Route: Transdermal    ondansetron (ZOFRAN-ODT) 4 MG ODT tab  120 tablet 0 12/10/2020    [place], [place] - [address] [facility]    Sig: Take 1 tablet (4 mg) by mouth every 6 hours as needed for nausea or vomiting    Class: E-Prescribe    Notes to Pharmacy: Please take upto 6 hours daily as needed for nausea    Route: Oral    oxyCODONE (ROXICODONE) 5 MG tablet  56 tablet 0 4/20/2021    [address]    Sig: Take 1-2 tablets (5-10 mg) by mouth every 3 hours as needed for moderate to severe pain    Class: E-Prescribe    Earliest Fill Date: 4/20/2021    Route: Oral    palbociclib (IBRANCE) 125 MG tablet  21 tablet 0 4/15/2021    [facility] [address]    Sig: Take 1 tablet (125 mg) by mouth daily Take for 21 days, then 7 days off.    Class: E-Prescribe    Route: Oral    Non-formulary Exception Code: Specific indication for non-formulary alternative    pantoprazole (PROTONIX) 40 MG EC tablet  30 tablet 1 3/16/2021    [id number] - [place], [place] - [address] [facility] [address]    Sig: Take 1 tablet (40 mg) by mouth every morning (before breakfast)    Class: E-Prescribe    Route: Oral    polyethylene glycol (MIRALAX) 17 GM/Dose powder  510 g 1 3/3/2021    [address] 1[address]    Sig: Take 17 g by mouth daily    Class: E-Prescribe    Route: Oral    prochlorperazine (COMPAZINE) 10 MG tablet  30 tablet 2 1/18/2021    [address]    Sig: Take 1 tablet (10 mg) by mouth every 6 hours as needed (Nausea/Vomiting)    Class: E-Prescribe    Route: Oral    Non-formulary Exception Code: Specific indication for non-formulary alternative    QUEtiapine  (SEROQUEL) 100 MG tablet  30 tablet 1 3/16/2021    [facility] #[id number] - [place], [place] - [address]    Sig: Take 1 tablet (100 mg) by mouth At Bedtime    Class: E-Prescribe    Route: Oral    rivaroxaban ANTICOAGULANT (XARELTO) 20 MG TABS tablet  30 tablet 11 3/3/2021    [address] 9[address]982    Sig: Take 1 tablet (20 mg) by mouth daily (with dinner)    Class: E-Prescribe    Route: Oral    sertraline (ZOLOFT) 50 MG tablet  30 tablet 1 3/16/2021    [id number] - [place], [place] - [address]    Sig: Take 1 tablet (50 mg) by mouth daily    Class: E-Prescribe    Route: Oral          ALLERGIES:   Patient has no known allergies.    ROS:  An 11 point review of system was performed and pertinent negative and positives are mentioned in HPI.        Physical Examination:   VITALS:   There were no vitals taken for this visit.  General:  Pt sitting in chair comfortably.  No acute distress  HEENT: normocephalic.  Neck supple  Neck: no JVD  Resp: nonlabored.  CTAb  CV: RRR.  S1 & S2.  No rub  Abd: nontender, soft, normoactive bowel sounds  Lymph: no pedal edema  Neuro: Oriented x3.  No evidence of focal motor deficits.  Motor exam is symmetrical and 5 out of 5 bilaterally.  She has some voluntary guarding.  Sensory exam is intact bilaterally.  Patient has exquisite tenderness upon palpation of her lower back.  This was localized using fluoroscopy and the point of maximum tenderness is on L4 spinous process.  Mood: appropriate affect      Labs:    BMP RESULTS:  Lab Results   Component Value Date     04/20/2021    POTASSIUM 4.0 04/20/2021    CHLORIDE 107 04/20/2021    CO2 24 04/20/2021    ANIONGAP 7 04/20/2021     (H) 04/20/2021    BUN 13 04/20/2021    CR 0.80 04/20/2021    GFRESTIMATED 88 04/20/2021    GFRESTBLACK >90 04/20/2021    NANDO 9.3  04/20/2021        CBC RESULTS:  Lab Results   Component Value Date    WBC 4.2 04/20/2021    RBC 3.45 (L) 04/20/2021    HGB 11.1 (L) 04/20/2021    HCT 33.1 (L) 04/20/2021    MCV 96 04/20/2021    MCH 32.2 04/20/2021    MCHC 33.5 04/20/2021    RDW 14.1 04/20/2021     04/20/2021       INR/PTT:  Lab Results   Component Value Date    INR 0.98 03/21/2021    PTT 29 12/06/2020       Diagnostic studies: Fluoroscopy exam performed on the day of clinic visit reveals point of maximum tenderness to be over the L4 spinous process.    Lumbar spine MRI dated 3/11/2021 reveals involvement of L2 and L3, increased since comparison study as well as S1 level.  The most significant metastatic deposit is within L4 vertebral body with extension to the right paraspinous tissue.  There is mild compression deformity, mostly involving the superior endplate and minimal neuroforaminal narrowing.  There is no retropulsion or erosion to the posterior wall of vertebral body.  No significant spinal canal narrowing from the static lesion. Postradiation changes are also evident.    Assessment 45-year-old female with polymetastatic ER positive breast cancer, predominantly involving axial skeleton, with the most prominent deposit involving L4 vertebral body, causing severe lifestyle limiting pain.  Patient does have some radiculopathy as well as extension to the right paraspinous tissue, however significant part of her metastatic focus remains contained within the L4 vertebral body without retropulsion or spinal canal narrowing.  This patient has received proper treatment with radiation and her pain remains uncontrolled.  I do believe she would be a good candidate for vertebral augmentation (kyphoplasty) given compression deformity as well as radiofrequency ablation of the metastatic focus. We did discuss the risk and benefits of this procedure including but not limited to lack of alleviation of pain, neurological complications, hemorrhagic  complications or infection.  She expressed understanding and all her questions were answered.  She desires to proceed with the treatment.      Plan  1.  Plan for kyphoplasty and radiofrequency ablation of L4 vertebral body metastatic lesion under general anesthesia in a biplanar room.  2.  PACs visit prior to general anesthesia.    It was a pleasure to participate in Ms. Sanderson care today.    I, Dr Santo Wiggins, was present for the entirety of this office visit and personally performed the history and exam. I have documented the findings as well as the assessment and plan.    A total of 100 minutes was spent on this clinic visit, more than half was on direct counseling and coordination of the care.    Santo Wiggins MD    Vascular and Interventional Radiolgy  [facility]    CC  Patient Care Team:  No Ref-Primary, Physician as PCP - General  Jahaira Plunkett MD as MD (Hematology & Oncology)  Viry Reyes PA-C as Referring Physician (Hematology & Oncology)  Faby Cleveland APRN CNM as Certified Nurse Midwife (OB/Gyn)  Manju Chaudhari MD as Assigned OBGYN Provider  Jahaira Plunkett MD as Assigned Cancer Care Provider  ENEIDA CHACON

## 2021-04-27 ENCOUNTER — TELEPHONE (OUTPATIENT)
Dept: VASCULAR SURGERY | Facility: CLINIC | Age: 46
End: 2021-04-27

## 2021-04-27 NOTE — TELEPHONE ENCOUNTER
FUTURE VISIT INFORMATION      SURGERY INFORMATION:    Date: 5/17/21    Location:  or    Surgeon:  Santo Wiggins MD    Anesthesia Type:  general    Procedure: ANESTHESIA OUT OF OR Radiofrequency Ablation Kyphoplasty @1200    Consult: ov 4/26/21    RECORDS REQUESTED FROM:       Pertinent Medical History: None

## 2021-04-27 NOTE — TELEPHONE ENCOUNTER
Called pt and informed her that we have her scheduled for her kyphoplasty procedure with Dr. Wiggins    Date: Mon 5/17/2021  Check in 10am   12pm procedure    All prep informed to her  Location details given    Date: 5/12:     Pac appt at 1030am   covid testing at 1230pm     All appt details given to her.     Informed her that I do see on her  mychart that she does have a scan on 5/17 in which I will send a note to Oncology to see if they can move this appt.     Informed her that I'll be mailing out a letter as well re: all information.     She agrees to plan.     Camilla ARANDA RN, BSN  Interventional Radiology/Vascular  Nurse Coordinator   Phone: [phone]  Fax: [phone]

## 2021-05-01 ENCOUNTER — TELEPHONE (OUTPATIENT)
Dept: ONCOLOGY | Facility: CLINIC | Age: 46
End: 2021-05-01

## 2021-05-09 DIAGNOSIS — C79.51 CARCINOMA OF LEFT BREAST METASTATIC TO BONE (H): ICD-10-CM

## 2021-05-09 DIAGNOSIS — Z17.0 MALIGNANT NEOPLASM OF OVERLAPPING SITES OF LEFT BREAST IN FEMALE, ESTROGEN RECEPTOR POSITIVE (H): Primary | ICD-10-CM

## 2021-05-09 DIAGNOSIS — C50.912 CARCINOMA OF LEFT BREAST METASTATIC TO BONE (H): ICD-10-CM

## 2021-05-09 DIAGNOSIS — C50.812 MALIGNANT NEOPLASM OF OVERLAPPING SITES OF LEFT BREAST IN FEMALE, ESTROGEN RECEPTOR POSITIVE (H): Primary | ICD-10-CM

## 2021-05-10 ENCOUNTER — TELEPHONE (OUTPATIENT)
Dept: ONCOLOGY | Facility: CLINIC | Age: 46
End: 2021-05-10
Payer: COMMERCIAL

## 2021-05-10 DIAGNOSIS — Z17.0 MALIGNANT NEOPLASM OF OVERLAPPING SITES OF LEFT BREAST IN FEMALE, ESTROGEN RECEPTOR POSITIVE (H): ICD-10-CM

## 2021-05-10 DIAGNOSIS — C50.912 CARCINOMA OF LEFT BREAST METASTATIC TO BONE (H): ICD-10-CM

## 2021-05-10 DIAGNOSIS — G89.3 CANCER ASSOCIATED PAIN: ICD-10-CM

## 2021-05-10 DIAGNOSIS — C50.919 MALIGNANT NEOPLASM OF FEMALE BREAST, UNSPECIFIED ESTROGEN RECEPTOR STATUS, UNSPECIFIED LATERALITY, UNSPECIFIED SITE OF BREAST (H): ICD-10-CM

## 2021-05-10 DIAGNOSIS — M79.661 PAIN OF RIGHT LOWER LEG: ICD-10-CM

## 2021-05-10 DIAGNOSIS — C50.812 MALIGNANT NEOPLASM OF OVERLAPPING SITES OF LEFT BREAST IN FEMALE, ESTROGEN RECEPTOR POSITIVE (H): ICD-10-CM

## 2021-05-10 DIAGNOSIS — N63.0 BREAST MASS: ICD-10-CM

## 2021-05-10 DIAGNOSIS — C79.51 SPINE METASTASIS: ICD-10-CM

## 2021-05-10 DIAGNOSIS — C79.51 METASTASIS TO BONE (H): ICD-10-CM

## 2021-05-10 DIAGNOSIS — C79.51 CARCINOMA OF LEFT BREAST METASTATIC TO BONE (H): ICD-10-CM

## 2021-05-10 RX ORDER — OXYCODONE HYDROCHLORIDE 5 MG/1
5-10 TABLET ORAL
Qty: 56 TABLET | Refills: 0 | Status: SHIPPED | OUTPATIENT
Start: 2021-05-10 | End: 2021-06-01

## 2021-05-10 RX ORDER — LIDOCAINE 4 G/G
1-2 PATCH TOPICAL EVERY 24 HOURS
Qty: 30 PATCH | Refills: 1 | Status: SHIPPED | OUTPATIENT
Start: 2021-05-10 | End: 2021-05-12

## 2021-05-10 RX ORDER — ANASTROZOLE 1 MG/1
1 TABLET ORAL DAILY
Qty: 28 TABLET | Refills: 0 | Status: SHIPPED | OUTPATIENT
Start: 2021-05-10 | End: 2021-05-13

## 2021-05-10 RX ORDER — GABAPENTIN 300 MG/1
CAPSULE ORAL
Qty: 150 CAPSULE | Refills: 0 | Status: SHIPPED | OUTPATIENT
Start: 2021-05-10 | End: 2021-06-01

## 2021-05-10 NOTE — TELEPHONE ENCOUNTER
[facility] Prior Authorization Team Request    Medication: Lidocaine 4% Patch  Dosing: Place 1 to 2 patches onto the skin every 24 hours  NDC (required for Medicaid members):     Insurance   BIN:[id number]  PCN:[id number]  Grp:  ID:[id number]    CoverMyMeds Key (if applicable):     Additional documentation:     Filling Pharmacy: [facility]  Phone Number: [phone]  Contact: [facility] [employer] (P [id number]) please send all responses to this pool.  Pharmacy NPI (required for Medicaid members):

## 2021-05-10 NOTE — TELEPHONE ENCOUNTER
"Narcotic Refill Request via phone by pt to [facility] pharmacy    Medication(s) Requested: oxy 5 mg and gabapentin 300 mg, lidocaine patch 4%    Last refilled date and by whom: 4/20 for #56 tabs by Dr. Plunkett     reviewed: not a delegate for this prescriber    Next appointment: 5/19 with Alee LOCK, last seen 4/20 by Dr. Plunkett    What pain is the medication treating: lower back, pt scheduled for lumbar surgery 5/17    How is the medication being taken: 5-6 tabs a day Oxy 5 mg, robaxin twice a day, gabapentin 3 times a day, ms contin every 12 hour    Is pain being adequately controlled on the current regimen: \"it eases the pain\", wants something stronger but also aware surgery is to help pain so unsure if she should switch at this time    Pt also stated she is on day 6 off of Ibrance so needs her refill delivered by tomorrow to start on Wed 5/12, stated she never received the Anastrozole, chart shows this was sent to [facility], she does not recall getting a call to set up delivery. Wondered if she should still be on this?   "

## 2021-05-10 NOTE — TELEPHONE ENCOUNTER
Central Prior Authorization Team   Phone: [phone]      PA Initiation    Medication: Lidocaine 4% Patch-PA initiated  Insurance Company: [employer] - Phone [phone] Fax [phone]  Pharmacy Filling the Rx: [facility] [place], [place] - [address] [place] [address] [phone]  Filling Pharmacy Phone: [phone]  Filling Pharmacy Fax:    Start Date: 5/10/2021

## 2021-05-12 NOTE — PHARMACY - PREOPERATIVE ASSESSMENT CENTER
Preoperative Assessment Center Medication History Note    Medication history completed on May 12, 2021 by this writer. See Epic admission navigator for prior to admission medications. Operating room staff will still need to confirm medications and last dose information on day of surgery.     Medication history interview sources:  Patient Interview and SureScripts,     Changes made to PTA medication list (reason)  Added: None    Deleted:   -- Lidocaine patches  -- nicotine patches    Changed: None    Additional medication history information (including reliability of information, actions taken by pharmacist):    -- No recent (within 30 days) course of antibiotics  -- No recent (within 30 days) course of systemic steroids  -- Patient declines being on any other prescription or over-the-counter medications  -- currently not taking arimidex since she hasnt received Ibrance for about 1.5 months. She has made calls to her pharmacy to figure out why. Last dose was about 1.5 months     Pain Medication Quantification - Patient is currently scheduled for a same day surgery. If this plan changes and patient is admitted, the inpatient pain management service could be consulted for specific pain management recommendations (available at pager [phone] from 8 AM - 3 PM Mon - Fri and available via phone answering service 24/7 at [phone]).     - OUTPATIENT MEDICATIONS (related to pain management):  -- Long-acting opioid: MS CONTIN 15mg by mouth every 12 hour   -- Short-acting opioid: oxyCODONE 5-10mg by mouth every 3 hour as needed (averages 6 tablets/day)  -- Intrathecal pump: None    Average daily oral morphine equivalent (OME): 75 mg of OME/day      Prior to Admission medications    Medication Sig Last Dose Taking? Auth Provider   anastrozole (ARIMIDEX) 1 MG tablet Take 1 tablet (1 mg) by mouth daily  Patient not taking: Reported on 5/12/2021 Not Taking  Jahaira Plunkett MD   gabapentin (NEURONTIN) 300 MG  capsule Take 2 capsules (600 mg) by mouth every morning AND 1 capsule (300 mg) daily at 2 pm AND 2 capsules (600 mg) At Bedtime. Taking Yes Jahaira Plunkett MD   methocarbamol (ROBAXIN) 500 MG tablet Take 1 tablet (500 mg) by mouth 4 times daily Taking Yes Ady Robbins MD   morphine (MS CONTIN) 15 MG CR tablet Take 1 tablet (15 mg) by mouth every 12 hours Taking Yes Jahaira Plunkett MD   ondansetron (ZOFRAN-ODT) 4 MG ODT tab Take 1 tablet (4 mg) by mouth every 6 hours as needed for nausea or vomiting Taking Yes Clyde Garcia MD   oxyCODONE (ROXICODONE) 5 MG tablet Take 1-2 tablets (5-10 mg) by mouth every 3 hours as needed for moderate to severe pain Taking Yes Jahaira Plunkett MD   pantoprazole (PROTONIX) 40 MG EC tablet Take 1 tablet (40 mg) by mouth every morning (before breakfast) Taking Yes Jahaira Plunkett MD   polyethylene glycol (MIRALAX) 17 GM/Dose powder Take 17 g by mouth daily  Patient taking differently: Take 17 g by mouth daily as needed  Taking Yes Alee Yang PA-C   prochlorperazine (COMPAZINE) 10 MG tablet Take 1 tablet (10 mg) by mouth every 6 hours as needed (Nausea/Vomiting) Taking Yes Jahaira Plunkett MD   QUEtiapine (SEROQUEL) 100 MG tablet Take 1 tablet (100 mg) by mouth At Bedtime Taking Yes Jahaira Plunkett MD   rivaroxaban ANTICOAGULANT (XARELTO) 20 MG TABS tablet Take 1 tablet (20 mg) by mouth daily (with dinner) Taking Yes Alee Yang PA-C   sertraline (ZOLOFT) 50 MG tablet Take 1 tablet (50 mg) by mouth daily Taking Yes Jahaira Plunkett MD   naloxone (NARCAN) 4 MG/0.1ML nasal spray Spray 1 spray (4 mg) into one nostril alternating nostrils once as needed for opioid reversal every 2-3 minutes until assistance arrives   Alee Yang PA-C   palbociclib (IBRANCE) 125 MG tablet Take 1 tablet (125 mg) by mouth daily Take for 21 days, then 7 days off.  Patient not taking:  Reported on 5/12/2021 Not Taking  Jahaira Plunkett MD       Medication history completed by: Shoaib Lorenz [facility]

## 2021-05-12 NOTE — PHARMACY - PREOPERATIVE ASSESSMENT CENTER
Anticoagulation Note - Preoperative Assessment Center (PAC) Pharmacist     Patient was interviewed on May 12, 2021 as a part of PAC clinic appointment. The purpose of this note is to document the perioperative anticoagulation plan outlined by the providers caring for Teresa Sanderson.     Current Regimen  Anticoagulation Regimen as of May 12, 2021: xarelto 20mg by mouth daily with dinner  Indication: DVT  Prescriber:  Alee Yang   Expected Duration of therapy: unknown    Creatinine   Date Value Ref Range Status   04/20/2021 0.80 0.52 - 1.04 mg/dL Final       Perioperative plan  Teresa Sanderson is scheduled for Radiofrequency Ablation Kyphoplasty on 5/17/21 with Dr. Wiggins and the perioperative anticoagulation plan outlined by PAC is to hold xarelto 72 hours prior with last dose the evening of 5/13/21.     Resumption of anticoagulation after procedure will be based on surgery team assessment of bleeding risks and complications.  This plan may require re-assessment and modification by her primary team in the perioperative setting depending on patients clinical situation.        Shoaib Lorenz RPH  May 12, 2021  10:55 AM

## 2021-05-12 NOTE — TELEPHONE ENCOUNTER
Prior Authorization Approval-per phone call from Danni at [employer]    Authorization Effective Date: 5/11/2021  Authorization Expiration Date: 5/10/2022  Medication: Lidocaine 4% Patch-PA approved  Approved Dose/Quantity:   Reference #:     Insurance Company: [employer] - Phone [phone] Fax [phone]  Expected CoPay:       CoPay Card Available:      Foundation Assistance Needed:    Which Pharmacy is filling the prescription (Not needed for infusion/clinic administered): [facility] [place], [place] - [address] [place] [address] [phone]  Pharmacy Notified: Yes  Patient Notified: No-pharmacy will contact

## 2021-05-13 ENCOUNTER — PRE VISIT (OUTPATIENT)
Dept: SURGERY | Facility: CLINIC | Age: 46
End: 2021-05-13

## 2021-05-13 ENCOUNTER — OFFICE VISIT (OUTPATIENT)
Dept: SURGERY | Facility: CLINIC | Age: 46
End: 2021-05-13
Payer: COMMERCIAL

## 2021-05-13 ENCOUNTER — ANESTHESIA EVENT (OUTPATIENT)
Dept: SURGERY | Facility: CLINIC | Age: 46
End: 2021-05-13
Payer: COMMERCIAL

## 2021-05-13 VITALS
WEIGHT: 267 LBS | BODY MASS INDEX: 36.16 KG/M2 | SYSTOLIC BLOOD PRESSURE: 148 MMHG | OXYGEN SATURATION: 97 % | RESPIRATION RATE: 16 BRPM | HEART RATE: 88 BPM | DIASTOLIC BLOOD PRESSURE: 90 MMHG | TEMPERATURE: 98.2 F | HEIGHT: 72 IN

## 2021-05-13 DIAGNOSIS — C79.51 CARCINOMA OF LEFT BREAST METASTATIC TO BONE (H): ICD-10-CM

## 2021-05-13 DIAGNOSIS — Z17.0 MALIGNANT NEOPLASM OF OVERLAPPING SITES OF LEFT BREAST IN FEMALE, ESTROGEN RECEPTOR POSITIVE (H): ICD-10-CM

## 2021-05-13 DIAGNOSIS — Z01.818 PRE-OP EXAMINATION: Primary | ICD-10-CM

## 2021-05-13 DIAGNOSIS — C50.912 CARCINOMA OF LEFT BREAST METASTATIC TO BONE (H): ICD-10-CM

## 2021-05-13 DIAGNOSIS — Z17.0 MALIGNANT NEOPLASM OF OVERLAPPING SITES OF LEFT BREAST IN FEMALE, ESTROGEN RECEPTOR POSITIVE (H): Primary | ICD-10-CM

## 2021-05-13 DIAGNOSIS — C50.812 MALIGNANT NEOPLASM OF OVERLAPPING SITES OF LEFT BREAST IN FEMALE, ESTROGEN RECEPTOR POSITIVE (H): Primary | ICD-10-CM

## 2021-05-13 DIAGNOSIS — Z11.59 ENCOUNTER FOR SCREENING FOR OTHER VIRAL DISEASES: ICD-10-CM

## 2021-05-13 DIAGNOSIS — C50.812 MALIGNANT NEOPLASM OF OVERLAPPING SITES OF LEFT BREAST IN FEMALE, ESTROGEN RECEPTOR POSITIVE (H): ICD-10-CM

## 2021-05-13 LAB
ALBUMIN SERPL-MCNC: 3.3 G/DL (ref 3.4–5)
ALP SERPL-CCNC: 69 U/L (ref 40–150)
ALT SERPL W P-5'-P-CCNC: 22 U/L (ref 0–50)
ANION GAP SERPL CALCULATED.3IONS-SCNC: 3 MMOL/L (ref 3–14)
AST SERPL W P-5'-P-CCNC: 11 U/L (ref 0–45)
BASOPHILS # BLD AUTO: 0.2 10E9/L (ref 0–0.2)
BASOPHILS NFR BLD AUTO: 3.5 %
BILIRUB SERPL-MCNC: 0.2 MG/DL (ref 0.2–1.3)
BUN SERPL-MCNC: 6 MG/DL (ref 7–30)
CALCIUM SERPL-MCNC: 8.5 MG/DL (ref 8.5–10.1)
CANCER AG27-29 SERPL-ACNC: 59 U/ML (ref 0–39)
CEA SERPL-MCNC: 1.8 UG/L (ref 0–2.5)
CHLORIDE SERPL-SCNC: 110 MMOL/L (ref 94–109)
CO2 SERPL-SCNC: 28 MMOL/L (ref 20–32)
CREAT SERPL-MCNC: 0.72 MG/DL (ref 0.52–1.04)
DIFFERENTIAL METHOD BLD: ABNORMAL
EOSINOPHIL # BLD AUTO: 0 10E9/L (ref 0–0.7)
EOSINOPHIL NFR BLD AUTO: 0 %
ERYTHROCYTE [DISTWIDTH] IN BLOOD BY AUTOMATED COUNT: 14.5 % (ref 10–15)
GFR SERPL CREATININE-BSD FRML MDRD: >90 ML/MIN/{1.73_M2}
GLUCOSE SERPL-MCNC: 104 MG/DL (ref 70–99)
HCT VFR BLD AUTO: 32.5 % (ref 35–47)
HGB BLD-MCNC: 10.6 G/DL (ref 11.7–15.7)
LABORATORY COMMENT REPORT: NORMAL
LYMPHOCYTES # BLD AUTO: 1 10E9/L (ref 0.8–5.3)
LYMPHOCYTES NFR BLD AUTO: 22.6 %
MACROCYTES BLD QL SMEAR: PRESENT
MCH RBC QN AUTO: 32.3 PG (ref 26.5–33)
MCHC RBC AUTO-ENTMCNC: 32.6 G/DL (ref 31.5–36.5)
MCV RBC AUTO: 99 FL (ref 78–100)
METAMYELOCYTES # BLD: 0 10E9/L
METAMYELOCYTES NFR BLD MANUAL: 0.9 %
MONOCYTES # BLD AUTO: 0.2 10E9/L (ref 0–1.3)
MONOCYTES NFR BLD AUTO: 4.3 %
NEUTROPHILS # BLD AUTO: 3 10E9/L (ref 1.6–8.3)
NEUTROPHILS NFR BLD AUTO: 68.7 %
PLATELET # BLD AUTO: 218 10E9/L (ref 150–450)
PLATELET # BLD EST: ABNORMAL 10*3/UL
POTASSIUM SERPL-SCNC: 3.5 MMOL/L (ref 3.4–5.3)
PROT SERPL-MCNC: 7.1 G/DL (ref 6.8–8.8)
RBC # BLD AUTO: 3.28 10E12/L (ref 3.8–5.2)
SARS-COV-2 RNA RESP QL NAA+PROBE: NEGATIVE
SARS-COV-2 RNA RESP QL NAA+PROBE: NORMAL
SODIUM SERPL-SCNC: 142 MMOL/L (ref 133–144)
SPECIMEN SOURCE: NORMAL
SPECIMEN SOURCE: NORMAL
WBC # BLD AUTO: 4.4 10E9/L (ref 4–11)

## 2021-05-13 PROCEDURE — 82378 CARCINOEMBRYONIC ANTIGEN: CPT | Mod: 90 | Performed by: PATHOLOGY

## 2021-05-13 PROCEDURE — 99203 OFFICE O/P NEW LOW 30 MIN: CPT | Performed by: NURSE PRACTITIONER

## 2021-05-13 PROCEDURE — 80053 COMPREHEN METABOLIC PANEL: CPT | Performed by: PATHOLOGY

## 2021-05-13 PROCEDURE — 85025 COMPLETE CBC W/AUTO DIFF WBC: CPT | Performed by: PATHOLOGY

## 2021-05-13 PROCEDURE — 86300 IMMUNOASSAY TUMOR CA 15-3: CPT | Mod: 90 | Performed by: PATHOLOGY

## 2021-05-13 PROCEDURE — U0005 INFEC AGEN DETEC AMPLI PROBE: HCPCS | Mod: 90 | Performed by: PATHOLOGY

## 2021-05-13 PROCEDURE — U0003 INFECTIOUS AGENT DETECTION BY NUCLEIC ACID (DNA OR RNA); SEVERE ACUTE RESPIRATORY SYNDROME CORONAVIRUS 2 (SARS-COV-2) (CORONAVIRUS DISEASE [COVID-19]), AMPLIFIED PROBE TECHNIQUE, MAKING USE OF HIGH THROUGHPUT TECHNOLOGIES AS DESCRIBED BY CMS-2020-01-R: HCPCS | Mod: 90 | Performed by: PATHOLOGY

## 2021-05-13 PROCEDURE — 36415 COLL VENOUS BLD VENIPUNCTURE: CPT | Performed by: PATHOLOGY

## 2021-05-13 RX ORDER — ANASTROZOLE 1 MG/1
1 TABLET ORAL DAILY
Qty: 28 TABLET | Refills: 0 | Status: SHIPPED | OUTPATIENT
Start: 2021-05-13 | End: 2021-06-10

## 2021-05-13 ASSESSMENT — LIFESTYLE VARIABLES: TOBACCO_USE: 1

## 2021-05-13 ASSESSMENT — PAIN SCALES - GENERAL: PAINLEVEL: WORST PAIN (10)

## 2021-05-13 ASSESSMENT — MIFFLIN-ST. JEOR: SCORE: 1968.1

## 2021-05-13 NOTE — PATIENT INSTRUCTIONS
Preparing for Your Surgery      Name:  Teresa Sanderson   MRN:  [id number]   :  1975   Today's Date:  2021       Arriving for surgery:  Surgery date:  21  Arrival time:  10:00 am    Restrictions due to COVID 19:  One consistent visitor per patient is allowed.  The visitor will be allowed in the pre-op area.  Visitors are asked to leave the building during the surgery.  No ill visitors.  All visitors must wear face mask.     parking is available for anyone with mobility limitations or disabilities.  ([place]  24 hours/ 7 days a week; [facility]  7 am- 3:30 pm, Mon- Fri)    Please come to:     [facility] [address]  [address] [place] [address]    - ? parking is available in front of the hospital      -    Please proceed to [address] on the 3rd floor. [phone]?     - ?If you are in need of directions, wheelchair or escort please stop at the Information Desk in the lobby.  Inform the information person that you are here for surgery; a wheelchair and escort to [address] will be provided.?     What can I eat or drink?  -  You may eat and drink normally for up to 8 hours before your surgery. (Until 4:00 am)  -  You may have clear liquids until 2 hours before surgery. (Until 10:00 am)    Examples of clear liquids:  Water  Clear broth  Juices (apple, white grape, white cranberry  and cider) without pulp  Noncarbonated, powder based beverages  (lemonade and Ephraim-Aid)  Sodas (Sprite, 7-Up, ginger ale and seltzer)  Coffee or tea (without milk or cream)  Gatorade    -  No Alcohol for at least 24 hours before surgery     Which medicines can I take?    Hold Aspirin for 7 days before surgery.   Hold Multivitamins for 7 days before surgery.  Hold Supplements for 7 days before surgery.  Hold Ibuprofen (Advil, Motrin) for 1 day before surgery--unless otherwise directed by surgeon.  Hold Naproxen (Aleve) for 4 days before  surgery.    **Hold Rivaroxaban (Xarelto) 72 hours prior with last dose the evening of 5/13/21.**       -  DO NOT take these medications the day of surgery:  Polyethylene Glycol (Miralax)    -  PLEASE TAKE these medications the day of surgery:  Gabapentin (Neurontin)  Methocarbamol (Robaxin)  Ondansetron (Zofran)  Oxycodone (Roxicodone)  Pantoprazole (Protonix)  Prochlorperazine (Compazine)  Sertraline (Zoloft)  Anastrozole (Arimidex)  Palbociclib (Ibrance)    How do I prepare myself?  - Please take 2 showers before surgery using Scrubcare or Hibiclens soap.    Use this soap only from the neck to your toes.     Leave the soap on your skin for one minute--then rinse thoroughly.      You may use your own shampoo and conditioner; no other hair products.   - Please remove all jewelry and body piercings.  - No lotions, deodorants or fragrance.  - No makeup or fingernail polish.   - Bring your ID and insurance card.    - All patients are required to have a Covid-19 test within 4 days of surgery/procedure.      -Patients will be contacted by the [facility] scheduling team within 1 week of surgery to make an appointment.      - Patients may call the Scheduling team at [phone] if they have not been scheduled within 4 days of  surgery.      ALL PATIENTS GOING HOME THE SAME DAY OF SURGERY ARE REQUIRED TO HAVE A RESPONSIBLE ADULT TO DRIVE AND BE IN ATTENDANCE WITH THEM FOR 24 HOURS FOLLOWING SURGERY.    IF THE RESPONSIBLE ADULT IS REQUIRED FOR POST OP TEACHING THE POST OP RN WILL ASK THEM TO COME BACK TO THE RECOVERY AREA.    Questions or Concerns:    - For any questions regarding the day of surgery or your hospital stay, please contact the Pre Admission Nursing Office at [phone].       - If you have health changes between today and your surgery please call your surgeon.       For questions after surgery please call your surgeons office.

## 2021-05-13 NOTE — TELEPHONE ENCOUNTER
Prior Authorization Not Needed per Insurance    Medication: IBRANCE - PA SUBMITTED  Insurance Company: "[employer] - Phone [phone] Fax [phone]  Expected CoPay:      Pharmacy Filling the Rx:  [facility]  Pharmacy Notified:    Patient Notified:      Rejection just states PA is needed but its just stating that because a PA is needed for pt to fill it here, which is not attainable. Pt has to fill this drug with [facility]

## 2021-05-13 NOTE — ANESTHESIA PREPROCEDURE EVALUATION
Anesthesia Pre-Procedure Evaluation    Patient: Teresa Sanderson   MRN: [id number] : 1975        Preoperative Diagnosis: * No surgery found *   Procedure :      Past Medical History:   Diagnosis Date     Anxiety      Breast CA (H) 2020     Depression      DVT (deep venous thrombosis) (H)      Left breast mass     x approximately 4-5 months     Pulmonary emboli (H)      Pyelonephritis      Schizoaffective disorder (H)      Tobacco use       Past Surgical History:   Procedure Laterality Date     TUBAL LIGATION        No Known Allergies   Social History     Tobacco Use     Smoking status: Current Every Day Smoker     Packs/day: 0.25     Types: Cigarettes     Smokeless tobacco: Never Used   Substance Use Topics     Alcohol use: Not Currently     Comment: occ      Wt Readings from Last 1 Encounters:   21 121.1 kg (267 lb)        Anesthesia Evaluation   Pt has had prior anesthetic. Type: General.    No history of anesthetic complications       ROS/MED HX  ENT/Pulmonary:     (+) FABIANO risk factors, obese, tobacco use (Smokes 3 cigarettes per day.  Smoked past 10 years <1PPD.), Current use,     Neurologic:     (+) peripheral neuropathy, - right leg down to foot. . migraines (1-2 per week with occular symptoms. ),     Cardiovascular: Comment: Denies cardiac symptoms including chest pain, SOB, palpitations, syncope, SEGOVIA, orthopnea, or PND.      METS/Exercise Tolerance:  Comment: Able to achieve 4 METs until late 2020 2/2 back and right leg pain.     Hematologic:     (+) History of blood clots (RLE DVT and PE in .), pt is anticoagulated,  (-) anemia and history of blood transfusion   Musculoskeletal: Comment: Difficult to stand for longer than 10 minutes 2/2 back pain that radiates down right leg.  Using a walker.       GI/Hepatic:     (+) GERD, Asymptomatic on medication,     Renal/Genitourinary:  - neg Renal ROS     Endo:     (+) Obesity,     Psychiatric/Substance Use:     (+) psychiatric  history anxiety and depression Recreational drug usage: Cannabis (Smokes marijuana 2X/week. ). (-) alcohol abuse history   Infectious Disease: Comment: COVID December 2020 - asymptomatic.       Malignancy: Comment: Radiation [id number] 2021.    Chemotherapy oral - daily; IV chemotherapy every three months.   (+) Malignancy, History of Breast.Breast CA Active status post Chemo and Radiation.        Other: Comment: S/p tubal ligation, 1998     (+) , H/O Chronic Pain,other significant disability Disability list: Uses a walker to prevent fall.          Physical Exam    Airway      Comment: Thick neck.    Mallampati: III   TM distance: > 3 FB   Neck ROM: full   Mouth opening: > 3 cm    Respiratory Devices and Support         Dental     Comment: Multiple dental caries with chipped off.   Appears to have a Class 3 malocculsion.     (+) missing      Cardiovascular   cardiovascular exam normal          Pulmonary   pulmonary exam normal            Other findings: Mild LE edema 1+    OUTSIDE LABS:  CBC:   Lab Results   Component Value Date    WBC 4.2 04/20/2021    WBC 5.5 03/25/2021    HGB 11.1 (L) 04/20/2021    HGB 12.2 03/25/2021    HCT 33.1 (L) 04/20/2021    HCT 36.6 03/25/2021     04/20/2021     03/25/2021     BMP:   Lab Results   Component Value Date     04/20/2021     03/25/2021    POTASSIUM 4.0 04/20/2021    POTASSIUM 4.2 03/25/2021    CHLORIDE 107 04/20/2021    CHLORIDE 105 03/25/2021    CO2 24 04/20/2021    CO2 24 03/25/2021    BUN 13 04/20/2021    BUN 18 03/25/2021    CR 0.80 04/20/2021    CR 0.71 03/25/2021     (H) 04/20/2021     (H) 03/25/2021     COAGS:   Lab Results   Component Value Date    PTT 29 12/06/2020    INR 0.98 03/21/2021    FIBR 546 (H) 12/09/2020     POC:   Lab Results   Component Value Date    HCGS Negative 12/05/2020     HEPATIC:   Lab Results   Component Value Date    ALBUMIN 3.2 (L) 04/20/2021    PROTTOTAL 7.4 04/20/2021    ALT 29 04/20/2021    AST  10 04/20/2021    ALKPHOS 85 04/20/2021    BILITOTAL 0.3 04/20/2021     OTHER:   Lab Results   Component Value Date    NANDO 9.3 04/20/2021    CRP 21.0 (H) 12/09/2020       Anesthesia Plan    ASA Status:  3      Anesthesia Type: General.     - Airway: ETT   Induction: Intravenous.   Maintenance: Balanced.        Consents    Anesthesia Plan(s) and associated risks, benefits, and realistic alternatives discussed. Questions answered and patient/representative(s) expressed understanding.     - Discussed with:  Patient         Postoperative Care    Pain management: IV analgesics.   PONV prophylaxis: Ondansetron (or other 5HT-3), Dexamethasone or Solumedrol     Comments:              PAC Discussion and Assessment    ASA Classification: 3  Case is suitable for: [place]  Anesthetic techniques and relevant risks discussed: GA                  PAC Resident/NP Anesthesia Assessment: Teresa Sanderson is a 45-year-old female scheduled for ANESTHESIA OUT OF OR Radiofrequency Ablation Kyphoplasty @1200 with GENERIC ANESTHESIA PROVIDER Santo Wiggins MD on 5/17/2021 at [facility] under general anesthesia.      Ms. Maldonado has ER positive left breast cancer metastasized to bones.  She was seen in INTERVENTIONAL RADIOLOGY CONSULTATION on 4/26/2021 with Dr. Wiggins for evaluation of painful bony metastases.  Through Dr. Wiggins's evaluation, she was found to have polymetastatic ER positive breast cancer, predominantly involving axial skeleton, with the most prominent deposit involving L4 vertebral body.  This is causing Ms. Sanderson severe lifestyle limiting pain. Dr. Wiggins counseled her on the above procedure.  She has opted to proceed with procedure.      Dx: Carcinoma of left breast metastatic to bone (H)     PAC referral for risk assessment and optimization of anesthesia with comorbid conditions of tobacco dependence, h/o DVT/PE, anticoagulated, GERD, anxiety, depression, migraines and hot flashes.     She has the  following specific operative considerations:   - FABIANO # of risks 3/8 = intermediae  - VTE risk:  4.5%  - Risk of PONV score = 2.  If > 2, anti-emetic intervention recommended.      #  Cardiology   - Denies known coronary artery disease.  Denies cardiac symptoms. METS:  <4 since November 2020 2/2 back and leg pain. RCRI : No serious cardiac risks.  0.4 % risk of major adverse cardiac event.         #  Pulmonary   - tobacco dependence, <1.0 PPD for 10 years.  Down to 3 cigarettes per day. Encouraged smoking cessation.  - Denies pulmonary symptoms  - No inhalers     #  Hematology/Oncology  - h/o DVT/PE (2014) on Xarelto.  Perioperative anticoagulation plan outlined by PAC is to hold xarelto 72 hours prior with last dose the evening of 5/13/21.   - polymetastatic ER positive breast cancer, predominantly involving axial skeleton, with the most prominent deposit involving L4 vertebral body. Reports taking MS [id number] mg twice daily and Oxycodone 5 mg two tablets three times daily. Continues to have lifestyle limiting pain. Above procedure planned to help with pain management.  Continue oral chemotherapy mediation as prescribed for DOS.  Using walker to help with balance.  Recommend fall precautions.   -  Anemia 2/2 chemotherapy     #  GI     - GERD, take PPI DOS    #  Endocrine     -  Obesity, BMI >35.0     #  Neuro  - Migraines and neuropathy, take gabapentin as prescribed DOS  - Mood disorder, take Zoloft and seroquel as prescribed DOS    #  ID  - COVID-19 testing per surgeon's office    #   Anesthesia considerations  - Denitition in poor repair - use caution.   - Evidence of class 3 malocclusion.   - Refer to PAC assessment in anesthesia records      Arrival time, NPO, shower and medication instructions provided by nursing staff today.    Patient was discussed with Dr Burris.Patient is an acceptable candidate for the proposed procedure.  No further diagnostic evaluation is needed.       **For further details of  assessment, testing, and physical exam please see H and P completed on same date.            Reviewed and Signed by PAC Mid-Level Provider/Resident  Mid-Level Provider/Resident: Tara BOJORQUEZ CNP  Date: 5/13/2021                                 Tara Pereira, RENO CNP

## 2021-05-13 NOTE — H&P
Pre-Operative H & P     CC:  Preoperative exam to assess for increased cardiopulmonary risk while undergoing surgery and anesthesia.    Date of Encounter: 5/13/2021  Primary Care Physician:  No Ref-Primary, Physician    ELISSA  Teresa Sanderson is a 45 year old female who presents for pre-operative H & P in preparation for ANESTHESIA OUT OF OR Radiofrequency Ablation Kyphoplasty @1200 with GENERIC ANESTHESIA PROVIDER Santo Wiggins MD on 5/17/2021 at [facility] under general anesthesia.      Ms. Maldonado has ER positive left breast cancer metastasized to bones.  She was seen in INTERVENTIONAL RADIOLOGY CONSULTATION on 4/26/2021 with Dr. Wiggins for evaluation of painful bony metastases.  Through Dr. Wiggins's evaluation, she was found to have polymetastatic ER positive breast cancer, predominantly involving axial skeleton, with the most prominent deposit involving L4 vertebral body.  This is causing Ms. Sanedrson severe lifestyle limiting pain. Dr. Wiggins counseled her on the above procedure.  She has opted to proceed with procedure.      PAC referral for risk assessment and optimization of anesthesia with comorbid conditions of tobacco dependence, h/o DVT/PE, anticoagulated, GERD, anxiety, depression, migraines and hot flashes.     Dx: Carcinoma of left breast metastatic to bone (H)       History is obtained from the patient and electronic health record.     Past Medical History  Past Medical History:   Diagnosis Date     Anxiety      Breast CA (H) 12/2020     Depression      DVT (deep venous thrombosis) (H) 2014     Left breast mass     x approximately 4-5 months     Pulmonary emboli (H)      Pyelonephritis      Schizoaffective disorder (H)      Tobacco use        Past Surgical History  Past Surgical History:   Procedure Laterality Date     TUBAL LIGATION  1998       Hx of Blood transfusions/reactions: denies     Hx of abnormal bleeding or anti-platelet use: denies    Menstrual history: No LMP recorded.  Patient has had an injection.:    Steroid use in the last year: denies    Personal or FH with difficulty with Anesthesia:  denies    Prior to Admission Medications  Current Outpatient Medications   Medication Sig Dispense Refill     gabapentin (NEURONTIN) 300 MG capsule Take 2 capsules (600 mg) by mouth every morning AND 1 capsule (300 mg) daily at 2 pm AND 2 capsules (600 mg) At Bedtime. 150 capsule 0     methocarbamol (ROBAXIN) 500 MG tablet Take 1 tablet (500 mg) by mouth 4 times daily 120 tablet 0     morphine (MS CONTIN) 15 MG CR tablet Take 1 tablet (15 mg) by mouth every 12 hours 60 tablet 0     ondansetron (ZOFRAN-ODT) 4 MG ODT tab Take 1 tablet (4 mg) by mouth every 6 hours as needed for nausea or vomiting 120 tablet 0     oxyCODONE (ROXICODONE) 5 MG tablet Take 1-2 tablets (5-10 mg) by mouth every 3 hours as needed for moderate to severe pain 56 tablet 0     pantoprazole (PROTONIX) 40 MG EC tablet Take 1 tablet (40 mg) by mouth every morning (before breakfast) 30 tablet 1     polyethylene glycol (MIRALAX) 17 GM/Dose powder Take 17 g by mouth daily (Patient taking differently: Take 17 g by mouth daily as needed ) 510 g 1     prochlorperazine (COMPAZINE) 10 MG tablet Take 1 tablet (10 mg) by mouth every 6 hours as needed (Nausea/Vomiting) 30 tablet 2     QUEtiapine (SEROQUEL) 100 MG tablet Take 1 tablet (100 mg) by mouth At Bedtime 30 tablet 1     rivaroxaban ANTICOAGULANT (XARELTO) 20 MG TABS tablet Take 1 tablet (20 mg) by mouth daily (with dinner) 30 tablet 11     sertraline (ZOLOFT) 50 MG tablet Take 1 tablet (50 mg) by mouth daily 30 tablet 1     anastrozole (ARIMIDEX) 1 MG tablet Take 1 tablet (1 mg) by mouth daily (Patient not taking: Reported on 5/12/2021) 28 tablet 0     naloxone (NARCAN) 4 MG/0.1ML nasal spray Spray 1 spray (4 mg) into one nostril alternating nostrils once as needed for opioid reversal every 2-3 minutes until assistance arrives 0.2 mL 0     palbociclib (IBRANCE) 125 MG tablet Take  1 tablet (125 mg) by mouth daily Take for 21 days, then 7 days off. (Patient not taking: Reported on 5/12/2021) 21 tablet 0       Allergies  No Known Allergies    Social History  Social History     Socioeconomic History     Marital status: Single     Spouse name: Not on file     Number of children: Not on file     Years of education: Not on file     Highest education level: Not on file   Occupational History     Not on file   Social Needs     Financial resource strain: Not on file     Food insecurity     Worry: Not on file     Inability: Not on file     Transportation needs     Medical: Not on file     Non-medical: Not on file   Tobacco Use     Smoking status: Current Every Day Smoker     Packs/day: 0.25     Types: Cigarettes     Start date: 5/13/2011     Smokeless tobacco: Never Used   Substance and Sexual Activity     Alcohol use: Not Currently     Comment: occ     Drug use: Yes     Types: Marijuana     Comment: occ     Sexual activity: Not Currently     Partners: Male   Lifestyle     Physical activity     Days per week: Not on file     Minutes per session: Not on file     Stress: Not on file   Relationships     Social connections     Talks on phone: Not on file     Gets together: Not on file     Attends [employer] service: Not on file     Active member of club or organization: Not on file     Attends meetings of clubs or organizations: Not on file     Relationship status: Not on file     Intimate partner violence     Fear of current or ex partner: Not on file     Emotionally abused: Not on file     Physically abused: Not on file     Forced sexual activity: Not on file   Other Topics Concern     Parent/sibling w/ CABG, MI or angioplasty before 65F 55M? Not Asked   Social History Narrative     Not on file       Family History  Family History   Problem Relation Age of Onset     Lung Cancer Mother      Lung Cancer Father      Lupus Brother      Kidney Disease Brother      Hypertension Other      Diabetes Other       "Asthma Son      Diabetes Daughter        ROS/MED HX  ENT/Pulmonary:     (+) FABIANO risk factors, obese, tobacco use (Smokes 3 cigarettes per day.  Smoked past 10 years <1PPD.), Current use,     Neurologic:     (+) peripheral neuropathy, - right leg down to foot. . migraines (1-2 per week with occular symptoms. ),     Cardiovascular: Comment: Denies cardiac symptoms including chest pain, SOB, palpitations, syncope, SEGOVIA, orthopnea, or PND.      METS/Exercise Tolerance:  Comment: Able to achieve 4 METs until late November 2020 2/2 back and right leg pain.     Hematologic:     (+) History of blood clots (RLE DVT and PE in 2014.), pt is anticoagulated,  (-) anemia and history of blood transfusion   Musculoskeletal: Comment: Difficult to stand for longer than 10 minutes 2/2 back pain that radiates down right leg.  Using a walker.       GI/Hepatic:     (+) GERD, Asymptomatic on medication,     Renal/Genitourinary:  - neg Renal ROS     Endo:     (+) Obesity,     Psychiatric/Substance Use:     (+) psychiatric history anxiety and depression Recreational drug usage: Cannabis (Smokes marijuana 2X/week. ). (-) alcohol abuse history   Infectious Disease: Comment: COVID December 2020 - asymptomatic.       Malignancy: Comment: Radiation [id number] 2021.    Chemotherapy oral - daily; IV chemotherapy every three months.   (+) Malignancy, History of Breast.Breast CA Active status post Chemo and Radiation.        Other: Comment: S/p tubal ligation, 1998     (+) , H/O Chronic Pain,other significant disability Disability list: Uses a walker to prevent fall.          Temp: 98.2  F (36.8  C) Temp src: Oral BP: (!) 148/90 Pulse: 88   Resp: 16 SpO2: 97 %         267 lbs 0 oz  6' 0\"[pt reported[   Body mass index is 36.21 kg/m .       Physical Exam  Constitutional: Awake, alert, cooperative, no apparent distress, and appears stated age.  Eyes: Pupils equal, round and reactive to light, extra ocular muscles intact, sclera clear, " conjunctiva normal.  HENT: Normocephalic, oral pharynx with moist mucus membranes, multiple dental caries with chipped off.   Appears to have a Class 3 malocculsion.   Thick neck.   Respiratory: Clear to auscultation bilaterally, no crackles or wheezing.  Cardiovascular: Regular rate and rhythm, normal S1 and S2, and no murmur noted.  Carotids +2, no bruits. Mild 1+ edema of LE. Palpable RA,  DP and PT arteries.   GI: Normal bowel sounds, soft and non-tender. Unable to adequately assess for hepatosplenomegaly given obese abdomen. No superficial masses noted.   Lymph/Hematologic: No cervical lymphadenopathy and no supraclavicular lymphadenopathy.  Genitourinary:  deferred  Skin: Warm and dry.  Tattoos.   Musculoskeletal: Full ROM of neck. Gross motor strength is normal.    Neurologic: Awake, alert, oriented to name, place and time. Altered gait using a walker.    Neuropsychiatric: Calm, cooperative. Normal affect.     Labs: (personally reviewed)  Lab Results   Component Value Date    WBC 4.4 05/13/2021     Lab Results   Component Value Date    RBC 3.28 05/13/2021     Lab Results   Component Value Date    HGB 10.6 05/13/2021     Lab Results   Component Value Date    HCT 32.5 05/13/2021     Lab Results   Component Value Date    MCV 99 05/13/2021     Lab Results   Component Value Date    MCH 32.3 05/13/2021     Lab Results   Component Value Date    MCHC 32.6 05/13/2021     Lab Results   Component Value Date    RDW 14.5 05/13/2021     Lab Results   Component Value Date     05/13/2021       Last Comprehensive Metabolic Panel:  Sodium   Date Value Ref Range Status   05/13/2021 142 133 - 144 mmol/L Final     Potassium   Date Value Ref Range Status   05/13/2021 3.5 3.4 - 5.3 mmol/L Final     Chloride   Date Value Ref Range Status   05/13/2021 110 (H) 94 - 109 mmol/L Final     Carbon Dioxide   Date Value Ref Range Status   05/13/2021 28 20 - 32 mmol/L Final     Anion Gap   Date Value Ref Range Status   05/13/2021 3 3 -  14 mmol/L Final     Glucose   Date Value Ref Range Status   05/13/2021 104 (H) 70 - 99 mg/dL Final     Urea Nitrogen   Date Value Ref Range Status   05/13/2021 6 (L) 7 - 30 mg/dL Final     Creatinine   Date Value Ref Range Status   05/13/2021 0.72 0.52 - 1.04 mg/dL Final     GFR Estimate   Date Value Ref Range Status   05/13/2021 >90 >60 mL/min/[1.73_m2] Final     Comment:     Non  GFR Calc  Starting 12/18/2018, serum creatinine based estimated GFR (eGFR) will be   calculated using the Chronic Kidney Disease Epidemiology Collaboration   (CKD-EPI) equation.       Calcium   Date Value Ref Range Status   05/13/2021 8.5 8.5 - 10.1 mg/dL Final     ECG: not indicated    LABS/DIAGNOSTIC STUDIES:   All labs personally reviewed       ASSESSMENT and PLAN  Teresa Sanderson is a 45 year old female scheduled to undergo ANESTHESIA OUT OF OR Radiofrequency Ablation Kyphoplasty @1200 with GENERIC ANESTHESIA PROVIDER Santo Wiggins MD on 5/17/2021 at [facility] under general anesthesia.         She has the following specific operative considerations:   - FABIANO # of risks 3/8 = intermediae  - VTE risk:  4.5%  - Risk of PONV score = 2.  If > 2, anti-emetic intervention recommended.      #  Cardiology   - Denies known coronary artery disease.  Denies cardiac symptoms. METS:  <4 since November 2020 2/2 back and leg pain. RCRI : No serious cardiac risks.  0.4 % risk of major adverse cardiac event.         #  Pulmonary   - tobacco dependence, <1.0 PPD for 10 years.  Currently smoking three cigarettes per day. Encouraged smoking cessation.  - Denies pulmonary symptoms  - No inhalers     #  Hematology/Oncology  - h/o DVT/PE (2014) on Xarelto.  Perioperative anticoagulation plan outlined by PAC is to hold xarelto 72 hours prior with last dose the evening of 5/13/21. Resumption of anticoagulation after procedure will be based on surgery team assessment of bleeding risks and complications.  This plan may require  re-assessment and modification by her primary team in the perioperative setting depending on patients clinical situation.    - polymetastatic ER positive breast cancer, predominantly involving axial skeleton, with the most prominent deposit involving L4 vertebral body. Reports taking MS [id number] mg twice daily and Oxycodone 5 mg two tablets three times daily. Continues to have lifestyle limiting pain. Above procedure planned to help with pain management.  Continue oral chemotherapy mediation as prescribed for DOS.  Using walker to help with balance.  Recommend fall precautions.   -  Anemia 2/2 chemotherapy     #  GI     - GERD, take PPI DOS    #  Endocrine     -  Obesity, BMI >35.0     #  Neuro  - Migraines and neuropathy, take gabapentin as prescribed DOS  - Mood disorder, take Zoloft and seroquel as prescribed DOS    #  ID  - COVID-19 testing per surgeon's office    # Anesthesia considerations  - Denitition in poor repair - use caution.   - Evidence of class 3 malocclusion.   - Refer to PAC assessment in anesthesia records      Arrival time, NPO, shower and medication instructions provided by nursing staff today.    Patient was discussed with Dr Burris.Patient is an acceptable candidate for the proposed procedure.  No further diagnostic evaluation is needed.       RENO Crandall CNP  Preoperative Assessment Center  [facility]  Phone: [phone]  Fax: [phone]

## 2021-05-14 DIAGNOSIS — K21.9 GASTROESOPHAGEAL REFLUX DISEASE WITHOUT ESOPHAGITIS: ICD-10-CM

## 2021-05-14 DIAGNOSIS — F41.8 MIXED ANXIETY AND DEPRESSIVE DISORDER: ICD-10-CM

## 2021-05-14 RX ORDER — QUETIAPINE FUMARATE 100 MG/1
100 TABLET, FILM COATED ORAL AT BEDTIME
Qty: 30 TABLET | Refills: 1 | Status: SHIPPED | OUTPATIENT
Start: 2021-05-14 | End: 2021-08-13

## 2021-05-14 RX ORDER — PANTOPRAZOLE SODIUM 40 MG/1
40 TABLET, DELAYED RELEASE ORAL
Qty: 30 TABLET | Refills: 1 | Status: SHIPPED | OUTPATIENT
Start: 2021-05-14 | End: 2022-05-13

## 2021-05-17 ENCOUNTER — HOSPITAL ENCOUNTER (OUTPATIENT)
Facility: CLINIC | Age: 46
Setting detail: OBSERVATION
Discharge: HOME | End: 2021-05-18
Attending: RADIOLOGY | Admitting: RADIOLOGY
Payer: COMMERCIAL

## 2021-05-17 ENCOUNTER — ANESTHESIA (OUTPATIENT)
Dept: SURGERY | Facility: CLINIC | Age: 46
End: 2021-05-17
Payer: COMMERCIAL

## 2021-05-17 ENCOUNTER — APPOINTMENT (OUTPATIENT)
Dept: INTERVENTIONAL RADIOLOGY/VASCULAR | Facility: CLINIC | Age: 46
End: 2021-05-17
Attending: RADIOLOGY
Payer: COMMERCIAL

## 2021-05-17 DIAGNOSIS — C79.51 CARCINOMA OF LEFT BREAST METASTATIC TO BONE (H): ICD-10-CM

## 2021-05-17 DIAGNOSIS — Z71.89 SURGICAL COUNSELING VISIT: ICD-10-CM

## 2021-05-17 DIAGNOSIS — C50.912 CARCINOMA OF LEFT BREAST METASTATIC TO BONE (H): ICD-10-CM

## 2021-05-17 DIAGNOSIS — C79.51 METASTASIS TO BONE (H): Primary | ICD-10-CM

## 2021-05-17 LAB
B-HCG SERPL-ACNC: <1 IU/L (ref 0–5)
GLUCOSE BLDC GLUCOMTR-MCNC: 130 MG/DL (ref 70–99)
HCG UR QL: NEGATIVE
INR PPP: 1.03 (ref 0.86–1.14)
INR PPP: NORMAL (ref 0.86–1.14)

## 2021-05-17 PROCEDURE — 99226 PR SUBSEQUENT OBSERVATION CARE,LEVEL III: CPT | Performed by: INTERNAL MEDICINE

## 2021-05-17 PROCEDURE — 999N001017 HC STATISTIC GLUCOSE BY METER IP

## 2021-05-17 PROCEDURE — 710N000010 HC RECOVERY PHASE 1, LEVEL 2, PER MIN

## 2021-05-17 PROCEDURE — 250N000011 HC RX IP 250 OP 636: Performed by: NURSE PRACTITIONER

## 2021-05-17 PROCEDURE — 250N000011 HC RX IP 250 OP 636: Performed by: NURSE ANESTHETIST, CERTIFIED REGISTERED

## 2021-05-17 PROCEDURE — 272N000503

## 2021-05-17 PROCEDURE — 272N000154 HC KIT CR14

## 2021-05-17 PROCEDURE — 272N000161 HC KIT CR32

## 2021-05-17 PROCEDURE — 250N000025 HC SEVOFLURANE, PER MIN

## 2021-05-17 PROCEDURE — 81025 URINE PREGNANCY TEST: CPT | Performed by: ANESTHESIOLOGY

## 2021-05-17 PROCEDURE — 250N000013 HC RX MED GY IP 250 OP 250 PS 637: Performed by: RADIOLOGY

## 2021-05-17 PROCEDURE — 20982 ABLATE BONE TUMOR(S) PERQ: CPT

## 2021-05-17 PROCEDURE — 258N000003 HC RX IP 258 OP 636: Performed by: NURSE ANESTHETIST, CERTIFIED REGISTERED

## 2021-05-17 PROCEDURE — 272N000624 HC BONE CEMENT CR13

## 2021-05-17 PROCEDURE — 250N000013 HC RX MED GY IP 250 OP 250 PS 637: Performed by: STUDENT IN AN ORGANIZED HEALTH CARE EDUCATION/TRAINING PROGRAM

## 2021-05-17 PROCEDURE — 84702 CHORIONIC GONADOTROPIN TEST: CPT | Performed by: NURSE PRACTITIONER

## 2021-05-17 PROCEDURE — 250N000011 HC RX IP 250 OP 636: Performed by: ANESTHESIOLOGY

## 2021-05-17 PROCEDURE — 250N000013 HC RX MED GY IP 250 OP 250 PS 637: Performed by: ANESTHESIOLOGY

## 2021-05-17 PROCEDURE — 250N000013 HC RX MED GY IP 250 OP 250 PS 637: Performed by: INTERNAL MEDICINE

## 2021-05-17 PROCEDURE — 85610 PROTHROMBIN TIME: CPT | Performed by: RADIOLOGY

## 2021-05-17 PROCEDURE — 250N000009 HC RX 250: Performed by: NURSE ANESTHETIST, CERTIFIED REGISTERED

## 2021-05-17 PROCEDURE — 99207 PR CDG-CODE CATEGORY CHANGED: CPT | Performed by: INTERNAL MEDICINE

## 2021-05-17 PROCEDURE — 250N000009 HC RX 250: Performed by: RADIOLOGY

## 2021-05-17 PROCEDURE — 999N000141 HC STATISTIC PRE-PROCEDURE NURSING ASSESSMENT

## 2021-05-17 PROCEDURE — 22514 PERQ VERTEBRAL AUGMENTATION: CPT

## 2021-05-17 PROCEDURE — G0378 HOSPITAL OBSERVATION PER HR: HCPCS

## 2021-05-17 PROCEDURE — 22511 PERQ LUMBOSACRAL INJECTION: CPT | Mod: GC | Performed by: RADIOLOGY

## 2021-05-17 PROCEDURE — 258N000003 HC RX IP 258 OP 636: Performed by: ANESTHESIOLOGY

## 2021-05-17 PROCEDURE — 370N000017 HC ANESTHESIA TECHNICAL FEE, PER MIN

## 2021-05-17 PROCEDURE — 250N000011 HC RX IP 250 OP 636: Performed by: INTERNAL MEDICINE

## 2021-05-17 RX ORDER — SODIUM CHLORIDE, SODIUM LACTATE, POTASSIUM CHLORIDE, CALCIUM CHLORIDE 600; 310; 30; 20 MG/100ML; MG/100ML; MG/100ML; MG/100ML
INJECTION, SOLUTION INTRAVENOUS CONTINUOUS PRN
Status: DISCONTINUED | OUTPATIENT
Start: 2021-05-17 | End: 2021-05-17

## 2021-05-17 RX ORDER — CEFAZOLIN SODIUM 2 G/100ML
2 INJECTION, SOLUTION INTRAVENOUS
Status: COMPLETED | OUTPATIENT
Start: 2021-05-17 | End: 2021-05-17

## 2021-05-17 RX ORDER — NALOXONE HYDROCHLORIDE 0.4 MG/ML
0.2 INJECTION, SOLUTION INTRAMUSCULAR; INTRAVENOUS; SUBCUTANEOUS
Status: DISCONTINUED | OUTPATIENT
Start: 2021-05-17 | End: 2021-05-18 | Stop reason: HOSPADM

## 2021-05-17 RX ORDER — OXYCODONE HYDROCHLORIDE 10 MG/1
10 TABLET ORAL EVERY 4 HOURS PRN
Status: DISCONTINUED | OUTPATIENT
Start: 2021-05-17 | End: 2021-05-17

## 2021-05-17 RX ORDER — MORPHINE SULFATE 15 MG/1
15 TABLET, FILM COATED, EXTENDED RELEASE ORAL EVERY 12 HOURS
Status: DISCONTINUED | OUTPATIENT
Start: 2021-05-18 | End: 2021-05-18 | Stop reason: HOSPADM

## 2021-05-17 RX ORDER — ACETAMINOPHEN 650 MG/1
650 SUPPOSITORY RECTAL EVERY 4 HOURS PRN
Status: DISCONTINUED | OUTPATIENT
Start: 2021-05-17 | End: 2021-05-18 | Stop reason: HOSPADM

## 2021-05-17 RX ORDER — FENTANYL CITRATE 50 UG/ML
25-50 INJECTION, SOLUTION INTRAMUSCULAR; INTRAVENOUS
Status: DISCONTINUED | OUTPATIENT
Start: 2021-05-17 | End: 2021-05-17

## 2021-05-17 RX ORDER — OXYCODONE HYDROCHLORIDE 5 MG/1
5-10 TABLET ORAL
Status: DISCONTINUED | OUTPATIENT
Start: 2021-05-17 | End: 2021-05-18 | Stop reason: HOSPADM

## 2021-05-17 RX ORDER — ACETAMINOPHEN 325 MG/1
975 TABLET ORAL ONCE
Status: COMPLETED | OUTPATIENT
Start: 2021-05-17 | End: 2021-05-17

## 2021-05-17 RX ORDER — OXYCODONE HYDROCHLORIDE 5 MG/1
5 TABLET ORAL EVERY 6 HOURS PRN
Qty: 12 TABLET | Refills: 0 | Status: SHIPPED | OUTPATIENT
Start: 2021-05-17 | End: 2021-05-20

## 2021-05-17 RX ORDER — NALOXONE HYDROCHLORIDE 0.4 MG/ML
0.4 INJECTION, SOLUTION INTRAMUSCULAR; INTRAVENOUS; SUBCUTANEOUS
Status: DISCONTINUED | OUTPATIENT
Start: 2021-05-17 | End: 2021-05-18 | Stop reason: HOSPADM

## 2021-05-17 RX ORDER — MORPHINE SULFATE 15 MG/1
15 TABLET, FILM COATED, EXTENDED RELEASE ORAL ONCE
Status: COMPLETED | OUTPATIENT
Start: 2021-05-17 | End: 2021-05-17

## 2021-05-17 RX ORDER — SODIUM CHLORIDE, SODIUM LACTATE, POTASSIUM CHLORIDE, CALCIUM CHLORIDE 600; 310; 30; 20 MG/100ML; MG/100ML; MG/100ML; MG/100ML
INJECTION, SOLUTION INTRAVENOUS CONTINUOUS
Status: DISCONTINUED | OUTPATIENT
Start: 2021-05-17 | End: 2021-05-18

## 2021-05-17 RX ORDER — GLYCOPYRROLATE 0.2 MG/ML
INJECTION, SOLUTION INTRAMUSCULAR; INTRAVENOUS PRN
Status: DISCONTINUED | OUTPATIENT
Start: 2021-05-17 | End: 2021-05-17

## 2021-05-17 RX ORDER — ONDANSETRON 4 MG/1
4 TABLET, ORALLY DISINTEGRATING ORAL EVERY 30 MIN PRN
Status: DISCONTINUED | OUTPATIENT
Start: 2021-05-17 | End: 2021-05-17 | Stop reason: ALTCHOICE

## 2021-05-17 RX ORDER — POLYETHYLENE GLYCOL 3350 17 G/17G
17 POWDER, FOR SOLUTION ORAL DAILY PRN
Status: DISCONTINUED | OUTPATIENT
Start: 2021-05-17 | End: 2021-05-18 | Stop reason: HOSPADM

## 2021-05-17 RX ORDER — LIDOCAINE HYDROCHLORIDE 20 MG/ML
INJECTION, SOLUTION INFILTRATION; PERINEURAL PRN
Status: DISCONTINUED | OUTPATIENT
Start: 2021-05-17 | End: 2021-05-17

## 2021-05-17 RX ORDER — ONDANSETRON 2 MG/ML
INJECTION INTRAMUSCULAR; INTRAVENOUS PRN
Status: DISCONTINUED | OUTPATIENT
Start: 2021-05-17 | End: 2021-05-17

## 2021-05-17 RX ORDER — ANASTROZOLE 1 MG/1
1 TABLET ORAL DAILY
Status: DISCONTINUED | OUTPATIENT
Start: 2021-05-18 | End: 2021-05-18 | Stop reason: HOSPADM

## 2021-05-17 RX ORDER — METHOCARBAMOL 500 MG/1
500 TABLET, FILM COATED ORAL 4 TIMES DAILY
Status: DISCONTINUED | OUTPATIENT
Start: 2021-05-17 | End: 2021-05-18 | Stop reason: HOSPADM

## 2021-05-17 RX ORDER — ONDANSETRON 2 MG/ML
4 INJECTION INTRAMUSCULAR; INTRAVENOUS EVERY 30 MIN PRN
Status: DISCONTINUED | OUTPATIENT
Start: 2021-05-17 | End: 2021-05-17 | Stop reason: ALTCHOICE

## 2021-05-17 RX ORDER — ACETAMINOPHEN 325 MG/1
650 TABLET ORAL EVERY 4 HOURS PRN
Status: DISCONTINUED | OUTPATIENT
Start: 2021-05-17 | End: 2021-05-18 | Stop reason: HOSPADM

## 2021-05-17 RX ORDER — KETAMINE HYDROCHLORIDE 10 MG/ML
INJECTION INTRAMUSCULAR; INTRAVENOUS PRN
Status: DISCONTINUED | OUTPATIENT
Start: 2021-05-17 | End: 2021-05-17

## 2021-05-17 RX ORDER — BUPIVACAINE HYDROCHLORIDE 5 MG/ML
10 INJECTION, SOLUTION PERINEURAL ONCE
Status: COMPLETED | OUTPATIENT
Start: 2021-05-17 | End: 2021-05-17

## 2021-05-17 RX ORDER — GABAPENTIN 300 MG/1
300 CAPSULE ORAL DAILY
Status: DISCONTINUED | OUTPATIENT
Start: 2021-05-18 | End: 2021-05-18 | Stop reason: HOSPADM

## 2021-05-17 RX ORDER — LIDOCAINE 40 MG/G
CREAM TOPICAL
Status: DISCONTINUED | OUTPATIENT
Start: 2021-05-17 | End: 2021-05-18 | Stop reason: HOSPADM

## 2021-05-17 RX ORDER — PROPOFOL 10 MG/ML
INJECTION, EMULSION INTRAVENOUS PRN
Status: DISCONTINUED | OUTPATIENT
Start: 2021-05-17 | End: 2021-05-17

## 2021-05-17 RX ORDER — QUETIAPINE FUMARATE 100 MG/1
100 TABLET, FILM COATED ORAL AT BEDTIME
Status: DISCONTINUED | OUTPATIENT
Start: 2021-05-17 | End: 2021-05-18 | Stop reason: HOSPADM

## 2021-05-17 RX ORDER — PROCHLORPERAZINE MALEATE 5 MG
10 TABLET ORAL EVERY 6 HOURS PRN
Status: DISCONTINUED | OUTPATIENT
Start: 2021-05-17 | End: 2021-05-18 | Stop reason: HOSPADM

## 2021-05-17 RX ORDER — GABAPENTIN 600 MG/1
600 TABLET ORAL 2 TIMES DAILY
Status: DISCONTINUED | OUTPATIENT
Start: 2021-05-17 | End: 2021-05-18 | Stop reason: HOSPADM

## 2021-05-17 RX ORDER — DEXAMETHASONE SODIUM PHOSPHATE 4 MG/ML
INJECTION, SOLUTION INTRA-ARTICULAR; INTRALESIONAL; INTRAMUSCULAR; INTRAVENOUS; SOFT TISSUE PRN
Status: DISCONTINUED | OUTPATIENT
Start: 2021-05-17 | End: 2021-05-17

## 2021-05-17 RX ORDER — FENTANYL CITRATE 50 UG/ML
INJECTION, SOLUTION INTRAMUSCULAR; INTRAVENOUS PRN
Status: DISCONTINUED | OUTPATIENT
Start: 2021-05-17 | End: 2021-05-17

## 2021-05-17 RX ORDER — SENNA AND DOCUSATE SODIUM 50; 8.6 MG/1; MG/1
1 TABLET, FILM COATED ORAL AT BEDTIME
Qty: 10 TABLET | Refills: 0 | Status: SHIPPED | OUTPATIENT
Start: 2021-05-17 | End: 2021-06-04

## 2021-05-17 RX ORDER — PANTOPRAZOLE SODIUM 40 MG/1
40 TABLET, DELAYED RELEASE ORAL
Status: DISCONTINUED | OUTPATIENT
Start: 2021-05-18 | End: 2021-05-18 | Stop reason: HOSPADM

## 2021-05-17 RX ORDER — HYDROMORPHONE HYDROCHLORIDE 1 MG/ML
.5-1 INJECTION, SOLUTION INTRAMUSCULAR; INTRAVENOUS; SUBCUTANEOUS
Status: DISCONTINUED | OUTPATIENT
Start: 2021-05-17 | End: 2021-05-18

## 2021-05-17 RX ORDER — HYDROMORPHONE HYDROCHLORIDE 1 MG/ML
.3-.5 INJECTION, SOLUTION INTRAMUSCULAR; INTRAVENOUS; SUBCUTANEOUS EVERY 5 MIN PRN
Status: DISCONTINUED | OUTPATIENT
Start: 2021-05-17 | End: 2021-05-17

## 2021-05-17 RX ORDER — ONDANSETRON 4 MG/1
4 TABLET, ORALLY DISINTEGRATING ORAL EVERY 6 HOURS PRN
Status: DISCONTINUED | OUTPATIENT
Start: 2021-05-17 | End: 2021-05-18 | Stop reason: HOSPADM

## 2021-05-17 RX ADMIN — HYDROMORPHONE HYDROCHLORIDE 0.5 MG: 1 INJECTION, SOLUTION INTRAMUSCULAR; INTRAVENOUS; SUBCUTANEOUS at 16:32

## 2021-05-17 RX ADMIN — CEFAZOLIN 1 G: 10 INJECTION, POWDER, FOR SOLUTION INTRAVENOUS at 15:12

## 2021-05-17 RX ADMIN — METHOCARBAMOL 500 MG: 500 TABLET, FILM COATED ORAL at 22:40

## 2021-05-17 RX ADMIN — GABAPENTIN 600 MG: 600 TABLET, FILM COATED ORAL at 22:46

## 2021-05-17 RX ADMIN — HYDROMORPHONE HYDROCHLORIDE 0.5 MG: 1 INJECTION, SOLUTION INTRAMUSCULAR; INTRAVENOUS; SUBCUTANEOUS at 23:59

## 2021-05-17 RX ADMIN — OXYCODONE HYDROCHLORIDE 10 MG: 5 TABLET ORAL at 19:08

## 2021-05-17 RX ADMIN — DEXAMETHASONE SODIUM PHOSPHATE 10 MG: 4 INJECTION, SOLUTION INTRA-ARTICULAR; INTRALESIONAL; INTRAMUSCULAR; INTRAVENOUS; SOFT TISSUE at 13:00

## 2021-05-17 RX ADMIN — SODIUM CHLORIDE, POTASSIUM CHLORIDE, SODIUM LACTATE AND CALCIUM CHLORIDE 100 ML/HR: 600; 310; 30; 20 INJECTION, SOLUTION INTRAVENOUS at 19:00

## 2021-05-17 RX ADMIN — ACETAMINOPHEN 650 MG: 325 TABLET, FILM COATED ORAL at 22:39

## 2021-05-17 RX ADMIN — FENTANYL CITRATE 50 MCG: 50 INJECTION, SOLUTION INTRAMUSCULAR; INTRAVENOUS at 15:57

## 2021-05-17 RX ADMIN — LIDOCAINE HYDROCHLORIDE 100 MG: 20 INJECTION, SOLUTION INFILTRATION; PERINEURAL at 12:59

## 2021-05-17 RX ADMIN — BUPIVACAINE HYDROCHLORIDE 18 ML: 5 INJECTION, SOLUTION PERINEURAL at 15:14

## 2021-05-17 RX ADMIN — ONDANSETRON 4 MG: 2 INJECTION INTRAMUSCULAR; INTRAVENOUS at 14:56

## 2021-05-17 RX ADMIN — FENTANYL CITRATE 50 MCG: 50 INJECTION, SOLUTION INTRAMUSCULAR; INTRAVENOUS at 16:14

## 2021-05-17 RX ADMIN — MIDAZOLAM 2 MG: 1 INJECTION INTRAMUSCULAR; INTRAVENOUS at 12:58

## 2021-05-17 RX ADMIN — FENTANYL CITRATE 50 MCG: 50 INJECTION, SOLUTION INTRAMUSCULAR; INTRAVENOUS at 14:42

## 2021-05-17 RX ADMIN — ACETAMINOPHEN 975 MG: 325 TABLET, FILM COATED ORAL at 19:08

## 2021-05-17 RX ADMIN — HYDROMORPHONE HYDROCHLORIDE 0.5 MG: 1 INJECTION, SOLUTION INTRAMUSCULAR; INTRAVENOUS; SUBCUTANEOUS at 17:14

## 2021-05-17 RX ADMIN — ROCURONIUM BROMIDE 30 MG: 10 INJECTION INTRAVENOUS at 13:16

## 2021-05-17 RX ADMIN — FENTANYL CITRATE 50 MCG: 50 INJECTION, SOLUTION INTRAMUSCULAR; INTRAVENOUS at 16:03

## 2021-05-17 RX ADMIN — ROCURONIUM BROMIDE 70 MG: 10 INJECTION INTRAVENOUS at 13:01

## 2021-05-17 RX ADMIN — QUETIAPINE FUMARATE 100 MG: 100 TABLET ORAL at 22:39

## 2021-05-17 RX ADMIN — OXYCODONE HYDROCHLORIDE 10 MG: 5 TABLET ORAL at 22:41

## 2021-05-17 RX ADMIN — GLYCOPYRROLATE 0.2 MG: 0.2 INJECTION, SOLUTION INTRAMUSCULAR; INTRAVENOUS at 15:21

## 2021-05-17 RX ADMIN — HYDROMORPHONE HYDROCHLORIDE 0.5 MG: 1 INJECTION, SOLUTION INTRAMUSCULAR; INTRAVENOUS; SUBCUTANEOUS at 13:39

## 2021-05-17 RX ADMIN — SUGAMMADEX 200 MG: 100 INJECTION, SOLUTION INTRAVENOUS at 15:21

## 2021-05-17 RX ADMIN — CEFAZOLIN 2 G: 10 INJECTION, POWDER, FOR SOLUTION INTRAVENOUS at 13:12

## 2021-05-17 RX ADMIN — HYDROMORPHONE HYDROCHLORIDE 0.5 MG: 1 INJECTION, SOLUTION INTRAMUSCULAR; INTRAVENOUS; SUBCUTANEOUS at 16:44

## 2021-05-17 RX ADMIN — RIVAROXABAN 20 MG: 20 TABLET, FILM COATED ORAL at 22:39

## 2021-05-17 RX ADMIN — HYDROMORPHONE HYDROCHLORIDE 0.5 MG: 1 INJECTION, SOLUTION INTRAMUSCULAR; INTRAVENOUS; SUBCUTANEOUS at 18:53

## 2021-05-17 RX ADMIN — PROPOFOL 150 MG: 10 INJECTION, EMULSION INTRAVENOUS at 12:59

## 2021-05-17 RX ADMIN — MORPHINE SULFATE 15 MG: 15 TABLET, EXTENDED RELEASE ORAL at 17:45

## 2021-05-17 RX ADMIN — FENTANYL CITRATE 50 MCG: 50 INJECTION, SOLUTION INTRAMUSCULAR; INTRAVENOUS at 15:48

## 2021-05-17 RX ADMIN — SODIUM CHLORIDE, POTASSIUM CHLORIDE, SODIUM LACTATE AND CALCIUM CHLORIDE: 600; 310; 30; 20 INJECTION, SOLUTION INTRAVENOUS at 12:48

## 2021-05-17 RX ADMIN — FENTANYL CITRATE 25 MCG: 50 INJECTION, SOLUTION INTRAMUSCULAR; INTRAVENOUS at 17:59

## 2021-05-17 RX ADMIN — FENTANYL CITRATE 100 MCG: 50 INJECTION, SOLUTION INTRAMUSCULAR; INTRAVENOUS at 12:59

## 2021-05-17 RX ADMIN — Medication 20 MG: at 13:16

## 2021-05-17 ASSESSMENT — MIFFLIN-ST. JEOR
SCORE: 1922
SCORE: 1918.21

## 2021-05-17 NOTE — H&P
Heme/Onc History and Physical  Department of Internal Medicine    Patient Name: Teresa Sanderson MRN# [id number]   Age: 45 year old YOB: 1975     Date of Admission:5/17/2021      Primary care provider: Sarah Ref-Primary, Physician  Date of Service: 5/17/2021  Admitting Team: Naun joshi          Assessment and Plan:   Teresa Sanderson is a 45 year old female  with history of DVT with left breast invasive ductal carcinoma, ER/CT positive, HER2 negative metastasized to bones on chemotherapy admitted to observation after an IR radiofrequency ablation and keloplasty/segmental plasty of L4 vertebral body.         #. Metastatic breast cancer/eft breast invasive ductal carcinoma, ER/CT positive, HER2 negative on chemo   Ibrance, zoladex, and anastrozole.  #. Cancer related back pain now status post IR radiofrequency ablation      - continues on MS Contin 15 mg BID   -Hydromorphone IV 1 mg Q 3 hrs PRN   -continue oncology follow up on discharge, there is a plan to discuss oophorectomy with Gyn/onc.  Patient is interested to see provider while in the hospital consider GYN consult in the morning  -Discussed with IR in the morning for discharge planning     Chronic condition      # DVT:  PTA Xarelto indefinitely with metastatic disease.     #.   Anxiety:  PTA Zoloft 50 mg PO daily and Seroquel 100 mg PO at bedtime.    #.  Migraines/hot flashes: PTA gabapentin 300 mg q hs.      CODE: Full   Diet/IVF: Regular diet.  DVT ppx: Xarelto   Disposition/Admission Status: Observation  Gabino Miranda MD  Hospitalist/sumaya  [facility]    Departments of Medicine   Pager: [phone]          Gabino Miranda MD  Hospitalist/sumaya  [facility]    Departments of Medicine   Pager: [phone]               Chief Complaint:     Admitted for observation post procedure       HPI:   Teresa Sanderson is a 45 year old female  with history of  DVT with left breast invasive ductal carcinoma, ER/VT positive, HER2 negative metastasized to bones on chemotherapy underwent elective radiofrequency ablation of her back for pain control and admitted to observation.  Patient reports that she cannot tell if the procedure has helped her pain is feeling a little sleepy as she is just waking up after anesthesia.  Does not endorse any fever no chest pain.          Past Medical History:     Past Medical History:   Diagnosis Date     Anxiety      Breast CA (H) 12/2020     Depression      DVT (deep venous thrombosis) (H) 2014     Left breast mass     x approximately 4-5 months     Pulmonary emboli (H)      Pyelonephritis      Schizoaffective disorder (H)      Tobacco use               Past Surgical History:     Past Surgical History:   Procedure Laterality Date     TUBAL LIGATION  1998              Social History:     Social History     Socioeconomic History     Marital status: Single     Spouse name: Not on file     Number of children: Not on file     Years of education: Not on file     Highest education level: Not on file   Occupational History     Not on file   Social Needs     Financial resource strain: Not on file     Food insecurity     Worry: Not on file     Inability: Not on file     Transportation needs     Medical: Not on file     Non-medical: Not on file   Tobacco Use     Smoking status: Current Every Day Smoker     Packs/day: 0.25     Types: Cigarettes     Start date: 5/13/2011     Smokeless tobacco: Never Used   Substance and Sexual Activity     Alcohol use: Not Currently     Comment: occ     Drug use: Yes     Types: Marijuana     Comment: occ     Sexual activity: Not Currently     Partners: Male   Lifestyle     Physical activity     Days per week: Not on file     Minutes per session: Not on file     Stress: Not on file   Relationships     Social connections     Talks on phone: Not on file     Gets together: Not on file     Attends Nondenominational service: Not on  file     Active member of club or organization: Not on file     Attends meetings of clubs or organizations: Not on file     Relationship status: Not on file     Intimate partner violence     Fear of current or ex partner: Not on file     Emotionally abused: Not on file     Physically abused: Not on file     Forced sexual activity: Not on file   Other Topics Concern     Parent/sibling w/ CABG, MI or angioplasty before 65F 55M? Not Asked   Social History Narrative     Not on file            Family History:     Family History   Problem Relation Age of Onset     Lung Cancer Mother      Lung Cancer Father      Lupus Brother      Kidney Disease Brother      Hypertension Other      Diabetes Other      Asthma Son      Diabetes Daughter             Immunizations:     There is no immunization history on file for this patient.           Allergies:    No Known Allergies         Medications:     No current facility-administered medications on file prior to encounter.   methocarbamol (ROBAXIN) 500 MG tablet, Take 1 tablet (500 mg) by mouth 4 times daily  morphine (MS CONTIN) 15 MG CR tablet, Take 1 tablet (15 mg) by mouth every 12 hours  prochlorperazine (COMPAZINE) 10 MG tablet, Take 1 tablet (10 mg) by mouth every 6 hours as needed (Nausea/Vomiting)  rivaroxaban ANTICOAGULANT (XARELTO) 20 MG TABS tablet, Take 1 tablet (20 mg) by mouth daily (with dinner)  naloxone (NARCAN) 4 MG/0.1ML nasal spray, Spray 1 spray (4 mg) into one nostril alternating nostrils once as needed for opioid reversal every 2-3 minutes until assistance arrives  ondansetron (ZOFRAN-ODT) 4 MG ODT tab, Take 1 tablet (4 mg) by mouth every 6 hours as needed for nausea or vomiting  polyethylene glycol (MIRALAX) 17 GM/Dose powder, Take 17 g by mouth daily (Patient taking differently: Take 17 g by mouth daily as needed )             Review of Systems:     A complete ROS was performed and is negative other than what is stated in the HPI.          Physical Exam:    Blood pressure (!) 132/93, pulse 64, temperature 98.4  F (36.9  C), temperature source Skin, resp. rate 13, height 1.829 m (6'), weight 116.5 kg (256 lb 13.4 oz), SpO2 98 %, not currently breastfeeding.  General Appearance: Pleasant not in distress   HEENT: No jaundice, moist BM   Respiratory: CTAB  Cardiovascular: RRR, s1, s2 no m/g   GI: Soft, mild left lower quadrant tenderness   Genitourinary: No CVAT   Extremities : No Edema   Neurologic: A&Ox3, moves all extremities equally               Data:   Reviewed in Epic

## 2021-05-17 NOTE — ANESTHESIA PROCEDURE NOTES
Airway       Patient location during procedure: OR  Staff -        Anesthesiologist:  Gary Vieira MD       CRNA: Yolanda Barrera APRN CRNA       Performed By: CRNA  Consent for Airway        Urgency: elective  Indications and Patient Condition       Indications for airway management: linda-procedural       Induction type:intravenous       Mask difficulty assessment: 1 - vent by mask    Final Airway Details       Final airway type: endotracheal airway       Successful airway: ETT - single and Oral  Endotracheal Airway Details        ETT size (mm): 7.0       Cuffed: yes       Successful intubation technique: direct laryngoscopy       DL Blade Type: Kern 2       Grade View of Cords: 1       Adjucts: stylet       Position: Left       Measured from: gums/teeth       Secured at (cm): 23    Post intubation assessment        Placement verified by: capnometry and chest rise        Number of attempts at approach: 1       Secured with: pink tape       Ease of procedure: easy       Dentition: Intact and Unchanged    Medication(s) Administered   Medication Administration Time: 5/17/2021 1:04 PM

## 2021-05-17 NOTE — PROCEDURES
[facility]    Procedure: IR Procedure Note    Date/Time: 5/17/2021 3:22 PM  Performed by: Karthik Mccoy DO  Authorized by: Karthik Mccoy DO   IR Fellow Physician: Darius    UNIVERSAL PROTOCOL   Site Marked: NA  Prior Images Obtained and Reviewed:  Yes  Required items: Required blood products, implants, devices and special equipment available    Patient identity confirmed:  Verbally with patient, arm band, provided demographic data and hospital-assigned identification number  Patient was reevaluated immediately before administering moderate or deep sedation or anesthesia  Confirmation Checklist:  Patient's identity using two indicators, relevant allergies, procedure was appropriate and matched the consent or emergent situation and correct equipment/implants were available  Time out: Immediately prior to the procedure a time out was called    Universal Protocol: the Joint Commission Universal Protocol was followed    Preparation: Patient was prepped and draped in usual sterile fashion    ESBL (mL):  10         ANESTHESIA    Anesthesia: Local infiltration  Local Anesthetic:  Lidocaine 1% without epinephrine      SEDATION    Patient Sedated: Yes    : General.  Vital signs: Vital signs monitored during sedation    See dictated procedure note for full details.  Findings: Uncomplicated radiofrequency ablation and kyphoplasty/segmental plasty of L4 vertebral body.    Specimens: none    Complications: None    Condition: Stable    Plan: Bedrest for 2 hours.  Patient to be discharged when meeting appropriate discharge criteria.    PROCEDURE   Patient Tolerance:  Patient tolerated the procedure well with no immediate complications    Length of time physician/provider present for 1:1 monitoring during sedation: 0 (See anesthesia note for further detail)

## 2021-05-17 NOTE — PROGRESS NOTES
Patient Name: Teresa Sanderson  Medical Record Number: [id number]  Today's Date: 5/17/2021    Procedure: Image Guided radiofrequency ablation and kyphoplasty/cementoplasty of L4  Proceduralist: Feliciano Mccoy MD    Procedure Start: 1325  Procedure end: 1512  Sedation medications administered: per Anesthesia     Report given to: PACU by Anesthesia  : n/a    Other Notes: Pt arrived to IR room 3 from Pre Induction. Consent reviewed. Pt denies any questions or concerns regarding procedure. Patient monitored by anesthesia during case. Pt positioned prone post intubation and monitored per protocol. Pt tolerated procedure without any noted complications. Pt transferred to PACU by anesthesia.

## 2021-05-17 NOTE — PROGRESS NOTES
Pt being admitted for pain management, rating pain 10/10 in lower back and not relived with current IV medications. Home dose of MS Contin ordered 15mg as pt did not take dose today. Pt's pain is 10/10 usually at baseline per pt. Pt admitted to obs for overnight. Dr. Mccoy at bedside.

## 2021-05-17 NOTE — PROGRESS NOTES
SPIRITUAL HEALTH SERVICES  [facility] ([place] 3C   PRE-SURGERY VISIT    Had pre-surgery visit with pt.  Provided spiritual support, prayer. Oriented to [facility] availability during hospital stay.      Debbie Arellano  Chaplain Resident  Pager: [phone]

## 2021-05-17 NOTE — ANESTHESIA CARE TRANSFER NOTE
Patient: Teresa Kin    Procedure(s):  ANESTHESIA OUT OF OR Radiofrequency Ablation Kyphoplasty @1200    Diagnosis: Carcinoma of left breast metastatic to bone (H) [C50.912, C79.51]  Diagnosis Additional Information: No value filed.    Anesthesia Type:   General     Note:    Oropharynx: spontaneously breathing and oropharynx clear of all foreign objects  Level of Consciousness: drowsy  Oxygen Supplementation: nasal cannula  Level of Supplemental Oxygen (L/min / FiO2): 2  Independent Airway: airway patency satisfactory and stable  Dentition: dentition unchanged  Vital Signs Stable: post-procedure vital signs reviewed and stable  Report to RN Given: handoff report given  Patient transferred to: PACU    Handoff Report: Identifed the Patient, Identified the Reponsible Provider, Reviewed the pertinent medical history, Discussed the surgical course, Reviewed Intra-OP anesthesia mangement and issues during anesthesia, Set expectations for post-procedure period and Allowed opportunity for questions and acknowledgement of understanding      Vitals: (Last set prior to Anesthesia Care Transfer)  CRNA VITALS  5/17/2021 1458 - 5/17/2021 1538      5/17/2021             NIBP:  109/47    Pulse:  89    NIBP Mean:  64    SpO2:  99 %        Electronically Signed By: RENO Wheat CRNA  May 17, 2021  3:38 PM

## 2021-05-18 ENCOUNTER — PATIENT OUTREACH (OUTPATIENT)
Dept: CARE COORDINATION | Facility: CLINIC | Age: 46
End: 2021-05-18

## 2021-05-18 VITALS
TEMPERATURE: 97.5 F | HEIGHT: 72 IN | RESPIRATION RATE: 18 BRPM | OXYGEN SATURATION: 94 % | DIASTOLIC BLOOD PRESSURE: 57 MMHG | HEART RATE: 75 BPM | WEIGHT: 256 LBS | SYSTOLIC BLOOD PRESSURE: 122 MMHG | BODY MASS INDEX: 34.67 KG/M2

## 2021-05-18 LAB
ERYTHROCYTE [DISTWIDTH] IN BLOOD BY AUTOMATED COUNT: 13.9 % (ref 10–15)
HCT VFR BLD AUTO: 30.4 % (ref 35–47)
HGB BLD-MCNC: 9.9 G/DL (ref 11.7–15.7)
MCH RBC QN AUTO: 31.8 PG (ref 26.5–33)
MCHC RBC AUTO-ENTMCNC: 32.6 G/DL (ref 31.5–36.5)
MCV RBC AUTO: 98 FL (ref 78–100)
PLATELET # BLD AUTO: 263 10E9/L (ref 150–450)
RBC # BLD AUTO: 3.11 10E12/L (ref 3.8–5.2)
WBC # BLD AUTO: 7 10E9/L (ref 4–11)

## 2021-05-18 PROCEDURE — G0378 HOSPITAL OBSERVATION PER HR: HCPCS

## 2021-05-18 PROCEDURE — 250N000011 HC RX IP 250 OP 636: Performed by: INTERNAL MEDICINE

## 2021-05-18 PROCEDURE — 99207 PR APP CREDIT; MD BILLING SHARED VISIT: CPT | Performed by: PHYSICIAN ASSISTANT

## 2021-05-18 PROCEDURE — 258N000003 HC RX IP 258 OP 636: Performed by: ANESTHESIOLOGY

## 2021-05-18 PROCEDURE — 99207 PR CDG-CODE CATEGORY CHANGED: CPT | Performed by: STUDENT IN AN ORGANIZED HEALTH CARE EDUCATION/TRAINING PROGRAM

## 2021-05-18 PROCEDURE — 99217 PR OBSERVATION CARE DISCHARGE: CPT | Performed by: STUDENT IN AN ORGANIZED HEALTH CARE EDUCATION/TRAINING PROGRAM

## 2021-05-18 PROCEDURE — 250N000013 HC RX MED GY IP 250 OP 250 PS 637: Performed by: RADIOLOGY

## 2021-05-18 PROCEDURE — 36415 COLL VENOUS BLD VENIPUNCTURE: CPT | Performed by: INTERNAL MEDICINE

## 2021-05-18 PROCEDURE — 250N000013 HC RX MED GY IP 250 OP 250 PS 637: Performed by: INTERNAL MEDICINE

## 2021-05-18 PROCEDURE — 85027 COMPLETE CBC AUTOMATED: CPT | Performed by: INTERNAL MEDICINE

## 2021-05-18 RX ADMIN — POLYETHYLENE GLYCOL 3350 17 G: 17 POWDER, FOR SOLUTION ORAL at 08:00

## 2021-05-18 RX ADMIN — METHOCARBAMOL 500 MG: 500 TABLET, FILM COATED ORAL at 13:08

## 2021-05-18 RX ADMIN — PANTOPRAZOLE SODIUM 40 MG: 40 TABLET, DELAYED RELEASE ORAL at 07:53

## 2021-05-18 RX ADMIN — SODIUM CHLORIDE, POTASSIUM CHLORIDE, SODIUM LACTATE AND CALCIUM CHLORIDE: 600; 310; 30; 20 INJECTION, SOLUTION INTRAVENOUS at 02:58

## 2021-05-18 RX ADMIN — HYDROMORPHONE HYDROCHLORIDE 0.5 MG: 1 INJECTION, SOLUTION INTRAMUSCULAR; INTRAVENOUS; SUBCUTANEOUS at 06:18

## 2021-05-18 RX ADMIN — GABAPENTIN 300 MG: 300 CAPSULE ORAL at 14:31

## 2021-05-18 RX ADMIN — SERTRALINE HYDROCHLORIDE 50 MG: 50 TABLET, FILM COATED ORAL at 07:53

## 2021-05-18 RX ADMIN — HYDROMORPHONE HYDROCHLORIDE 0.5 MG: 1 INJECTION, SOLUTION INTRAMUSCULAR; INTRAVENOUS; SUBCUTANEOUS at 02:59

## 2021-05-18 RX ADMIN — OXYCODONE HYDROCHLORIDE 10 MG: 5 TABLET ORAL at 11:16

## 2021-05-18 RX ADMIN — ACETAMINOPHEN 650 MG: 325 TABLET, FILM COATED ORAL at 07:01

## 2021-05-18 RX ADMIN — OXYCODONE HYDROCHLORIDE 10 MG: 5 TABLET ORAL at 14:31

## 2021-05-18 RX ADMIN — OXYCODONE HYDROCHLORIDE 10 MG: 5 TABLET ORAL at 07:01

## 2021-05-18 RX ADMIN — MORPHINE SULFATE 15 MG: 15 TABLET, EXTENDED RELEASE ORAL at 07:53

## 2021-05-18 RX ADMIN — GABAPENTIN 600 MG: 600 TABLET, FILM COATED ORAL at 07:53

## 2021-05-18 RX ADMIN — ANASTROZOLE 1 MG: 1 TABLET ORAL at 07:53

## 2021-05-18 RX ADMIN — HYDROMORPHONE HYDROCHLORIDE 0.5 MG: 1 INJECTION, SOLUTION INTRAMUSCULAR; INTRAVENOUS; SUBCUTANEOUS at 09:50

## 2021-05-18 RX ADMIN — METHOCARBAMOL 500 MG: 500 TABLET, FILM COATED ORAL at 07:53

## 2021-05-18 NOTE — PROGRESS NOTES
Pt has order to discharge today. Pt is stable and agreeable. PIV removed. Reviewed discharge instructions and meds with pt. Pt asking questions appropriately, verbalizes understanding. Pt to f/u as discussed. Family will provide transportation home.

## 2021-05-18 NOTE — PLAN OF CARE
Pt alert and oriented, VSS on 2 L O2 via nasal cannula overnight. Plan to potentially wean O2 today. Admit from PACU for Radiofrequency ablation and kyphoplasty of L4 vertebral body for pain of metastatic breast cancer to the bone. Up independently/SBA with walker in room and to bathroom. Voiding spontaneously and no BM overnight. Regular diet and tolerating well. Continuous pulse ox in place now. PRN tylenol, oxycodone, and dilaudid for complaints of back pain. Left hand PIV infusing LR @ 100 mL/hr. Observation status and goals charted Q 4 hr. Spiritual health consult placed, patient aware. Dressing on back clean and intact. Continue with POC.    Observations Goals:     Adequate pain control: Not met.   ADMIT

## 2021-05-18 NOTE — PROGRESS NOTES
"SPIRITUAL HEALTH SERVICES  SPIRITUAL ASSESSMENT Progress Note  [facility] ([place]) 5A     REFERRAL SOURCE: Pt consult request    Pt expressed frustration at how long other hospitals had taken to diagnose her, and gladness at how well she was taken care of here at this hospital. Pt expressed gratitude for the staff and nurses and how they listeend to her and took her seriously. Pt described this as \"when she is here she hands over all her worries and we take them from her, and then when she leaves she takes them back.\"    Pt expressed her worries in questioning her bassam in regards to her diagnosis and we discussed how Psalm 6 can influence that thinking to deepen her bassam and feel cared for by God in the midst of suffering.     I provided pt with a printed copy of Psalm 6 at her request and provided prayer for healing and peace at pt's request. I provided emotional and spiritual support.    PLAN: Spiritual health services remains available for any follow-up or requests for reiki, smudging, tarot, prayer, ritual, or company.    Yany Russell  Chaplain Resident  Pager: [phone]    "

## 2021-05-18 NOTE — DISCHARGE SUMMARY
[facility]  Hospitalist Discharge Summary      Date of Admission:  5/17/2021  Date of Discharge:  5/18/2021  4:10 PM  Discharging Provider: Nadya Colon PA-C  Discharge Team: Hospitalist Service, Gold 4    Discharge Diagnoses   Invasive ductal carcinoma of left breast with ER/NC positive, HER2 negative  Metastatic breast cancer  Acute on chronic pain  S/P radiofrequency ablation, keloplasty/segmental plasty of L4    Follow-ups Needed After Discharge   Follow-up Appointments     Adult [facility] Follow-up and recommended labs and tests      Follow up with primary care provider, Physician No Ref-Primary, within 7   days for hospital follow- up.   Follow up with Oncology as planned.    I placed a referral for gynecology as well.   Appointments on [place] and/or [facility] (with [facility] or [facility]   provider or service). Call [phone] if you haven't heard regarding   these appointments within 7 days of discharge.             Unresulted Labs Ordered in the Past 30 Days of this Admission     No orders found for last 31 day(s).          Discharge Disposition   Discharged to home  Condition at discharge: Good    Hospital Course   Teresa Sanderson is a 45 year old female with history of invasive ductal carcinoma of left breast, ER/NC positive, HER2 negative metastasized to bones on chemotherapy, hx of DVT, who was admitted for observation and pain control after an IR radiofrequency ablation and keloplasty/segmental plasty of L4 vertebral body. Pain was controlled overnight on home regimen with addition of IV dilaudid and was tolerable on oral regimen at discharge.    Invasive ductal carcinoma of left breast with ER/NC positive, HER2 negative  Metastatic breast cancer  Acute on chronic pain  S/P radiofrequency ablation, keloplasty/segmental plasty of L4  - Continued pta MS contin, oxycodone  - Follow-up with Oncology as outpatient  - Referral for Gynecology as  outpatient    Hx of falls  Previously having frequent falls. In discussion with patient, these have improved since using her walker and no falls in the past 2-3 months.    Chronic/Stable Medical Problems:  Hx of DVT - continued pta xarelto  Anxiety - pta zoloft 50 mg daily, seroquel 100 mg at bedtime   Neuropathy - gabapentin 300 mg at bedtime     Consultations This Hospital Stay   SPIRITUAL HEALTH SERVICES IP CONSULT    Code Status   Full Code    Time Spent on this Encounter   I, Nadya Colon PA-C, personally saw the patient today and spent greater than 30 minutes discharging this patient.       Nadya Colon PA-C  [facility] [address]  [address] [address]  Phone: [phone]  ______________________________________________________________________    Physical Exam   Vital Signs: Temp: 96.1  F (35.6  C) Temp src: Oral BP: 104/64 Pulse: 60   Resp: 18 SpO2: 97 % O2 Device: None (Room air) Oxygen Delivery: 2 LPM  Weight: 256 lbs 0 oz  General Appearance: Awake, alert and oriented x 3. No acute distress. Pleasant and interactive.  Respiratory: Normal work of breathing on room air. Lungs clear to auscultation bilaterally. No wheezes, rhonchi or rales.  Cardiovascular: RRR. S1, S2. No murmurs, rubs or gallops. Pedal pulses 2+ No lower extremity edema.  GI: Abdomen non-distended. Normoactive bowel sounds. Nontender throughout.  Skin: Warm, dry. No rashes on exposed skin.  Neuro: No focal deficits.        Primary Care Physician   Physician No Ref-Primary    Discharge Orders      Ob/Gyn Referral      Reason for your hospital stay    You were admitted for observation after radiofrequency ablation and keloplasty/segmental plasty of L4 vertebral body with IR to help with pain control.     Adult [facility] Follow-up and recommended labs and tests    Follow up with primary care provider, Physician No Ref-Primary, within 7 days for hospital follow- up.   Follow up with Oncology as  planned.    I placed a referral for gynecology as well.   Appointments on [place] and/or [facility] (with [facility] or [facility] provider or service). Call [phone] if you haven't heard regarding these appointments within 7 days of discharge.     Activity    Your activity upon discharge: activity as tolerated and no driving for today (no driving or operating heavy machinery while taking narcotic pain medication)     When to contact your care team    If you are having worsening pain, numbness or tingling in your leg, you should be seen by your PCP or oncology team.     Full Code     Diet    Follow this diet upon discharge: Regular       Significant Results and Procedures   Most Recent 3 CBC's:  Recent Labs   Lab Test 05/18/21  0633 05/13/21  1109 04/20/21  1004   WBC 7.0 4.4 4.2   HGB 9.9* 10.6* 11.1*   MCV 98 99 96    218 218     Most Recent 3 BMP's:  Recent Labs   Lab Test 05/13/21  1109 04/20/21  1004 03/25/21  1108    138 137   POTASSIUM 3.5 4.0 4.2   CHLORIDE 110* 107 105   CO2 28 24 24   BUN 6* 13 18   CR 0.72 0.80 0.71   ANIONGAP 3 7 7   NANDO 8.5 9.3 9.4   * 163* 135*   ,   Results for orders placed or performed during the hospital encounter of 05/17/21   IR Lumbar Kyphoplasty Vertebrae    Narrative    PROCEDURES :  1. Fluoroscopic guided L4 radiofrequency ablation.  2. Fluoroscopic guided L4 kyphoplasty.    Clinical History: 45 years with past medical history of breast cancer  and a L4 metastasis with recalcitrant pain. Previous radiation  therapy.     Comparisons: MRI 3/11/2021, abdomen and pelvis 12/5/2020    Attending: Santo Wiggins MD, who was present for the key and critical  portions of the procedure.    Fellow: Karthik Mccoy D.O.    Monitoring: Patient was placed under general anesthesia. Anesthesia  team was present for the entire procedure. Patient was stable  throughout the case. Please see their note for further details.    Medications:   Please see anesthesia notes for  further detail.  0.5% bupivacaine, 18 cc    PROCEDURE: The patient understood the limitations, alternatives, and  risks of the procedure and requested the procedure be performed. Both  written and oral consent were obtained.    Patient was placed prone on the interventional radiology table.  Patient was noted to have 5 lumbar type vertebral bodies consistent  with previous MRI scanning. L4 vertebral body was selected as this is  the lumbar body with most significant metastatic disease.    A 22-gauge 5 inch spinal needle was advanced to the right pedicle  under fluoroscopy. Periosteum was anesthetized with 0.05% bupivacaine.  Needle was removed. A small the dermatotomy was made with a #11 blade.  A 10-gauge Medtronic cannula was placed through the right pedicle  under fluoroscopy, although noted to be slightly more lateral within  the vertebral bone than anticipated. It was very hard to advance and  direct the cannula in this vertebral body, likely due to previous  radiation change. This was removed and replaced with a more  satisfactory trajectory from the superior lateral margin of the  pedicle towards the midline within the vertebral body. Images saved to  document cannula placement.    A 22-gauge 5 inch spinal needle was advanced to the left pedicle under  fluoroscopy. Periosteum was anesthetized with 0.05% bupivacaine.  Needle was removed. A small the dermatotomy was made with a #11 blade.  A 10-gauge Medtronic cannula was placed through the left pedicle under  fluoroscopy, although noted to be again slightly more lateral within  the vertebral bone than anticipated. It was very hard to advance and  direct the cannula in this vertebral body, likely due to previous  radiation change. This was removed and replaced with a more  satisfactory trajectory from the superior lateral margin of the  pedicle and into a more satisfactory medial position within the  vertebral body. Images saved to document cannula  placement.    The vertebral body was drilled out with the Medtronic drill and  measured to be 10 mm linear frequency probe (blue) for both cannulae.  Again, was very hard to drill through this dense bone. Drill was  removed. The [employer] Medtronic radiofrequency probes were advanced  under fluoroscopy. Radiofrequency ablation was carried out for 7.5  minutes with normal impedence values in both probes. Probes were  removed.    Medtronic kyphoplasty balloons were attempted to be advanced, only the  left balloon was able to be advanced within the vertebral body. The  right cannula was redrilled and balloon readvanced, although was  unsuccessful and extending out of the cannula. The curved Medtronic  cannula was attempted to create more space, although was unsuccessful.  Very high pressures were noted while inflating the left balloon with  minimal bullous expansion. Image saved to document this.    Balloons were removed. Cementoplasty was performed through the right  cannula first. There was note of intravasation into a paravertebral  vein anterior to the vertebral body. Cement plasty was stopped and the  cannula was pulled back. The cement in this vein did not travel any  further than the paravertebral system. Cementoplasty was reperformed  demonstrating adequate coverage of the lateral and medial margin of  the right half of the vertebral body. This same process was used for  the left cannula. Cementoplasty injection cannula was removed. Image  was saved to document adequate coverage of the vertebral body with  cementoplasty. No evidence for cement tracking along the cannula as  they were removed or into the spinal canal.    Site was cleansed and dressed with a sterile dressing. No immediate  complication.    Fluoroscopy time: 5.9 minutes       Impression    IMPRESSION:   Uncomplicated radiofrequency ablation and kypho/cementoplasty of L4  vertebral body for breast metastasis with recalcitrant  pain.    PLAN:  Bedrest for 2 hours.  Follow-up with interventional radiology in 4 weeks.       Discharge Medications   Current Discharge Medication List      START taking these medications    Details   !! oxyCODONE (ROXICODONE) 5 MG tablet Take 1 tablet (5 mg) by mouth every 6 hours as needed for pain  Qty: 12 tablet, Refills: 0    Associated Diagnoses: Metastasis to bone (H)      SENNA-docusate sodium (SENNA S) 8.6-50 MG tablet Take 1 tablet by mouth At Bedtime  Qty: 10 tablet, Refills: 0    Associated Diagnoses: Metastasis to bone (H)       !! - Potential duplicate medications found. Please discuss with provider.      CONTINUE these medications which have NOT CHANGED    Details   gabapentin (NEURONTIN) 300 MG capsule Take 2 capsules (600 mg) by mouth every morning AND 1 capsule (300 mg) daily at 2 pm AND 2 capsules (600 mg) At Bedtime.  Qty: 150 capsule, Refills: 0    Associated Diagnoses: Pain of right lower leg; Metastasis to bone (H); Malignant neoplasm of female breast, unspecified estrogen receptor status, unspecified laterality, unspecified site of breast (H); Breast mass; Spine metastasis (H); Malignant neoplasm of overlapping sites of left breast in female, estrogen receptor positive (H); Carcinoma of left breast metastatic to bone (H)      methocarbamol (ROBAXIN) 500 MG tablet Take 1 tablet (500 mg) by mouth 4 times daily  Qty: 120 tablet, Refills: 0    Associated Diagnoses: Pain of right lower leg; Metastasis to bone (H); Malignant neoplasm of female breast, unspecified estrogen receptor status, unspecified laterality, unspecified site of breast (H); Breast mass; Spine metastasis (H); Malignant neoplasm of overlapping sites of left breast in female, estrogen receptor positive (H); Carcinoma of left breast metastatic to bone (H)      morphine (MS CONTIN) 15 MG CR tablet Take 1 tablet (15 mg) by mouth every 12 hours  Qty: 60 tablet, Refills: 0    Associated Diagnoses: Carcinoma of left breast metastatic  to bone (H); Cancer associated pain      palbociclib (IBRANCE) 125 MG tablet Take 1 tablet (125 mg) by mouth daily Take for 21 days, then 7 days off.  Qty: 21 tablet, Refills: 0    Associated Diagnoses: Malignant neoplasm of overlapping sites of left breast in female, estrogen receptor positive (H); Carcinoma of left breast metastatic to bone (H)      prochlorperazine (COMPAZINE) 10 MG tablet Take 1 tablet (10 mg) by mouth every 6 hours as needed (Nausea/Vomiting)  Qty: 30 tablet, Refills: 2    Associated Diagnoses: Carcinoma of left breast metastatic to bone (H); Malignant neoplasm of overlapping sites of left breast in female, estrogen receptor positive (H)      QUEtiapine (SEROQUEL) 100 MG tablet Take 1 tablet (100 mg) by mouth At Bedtime  Qty: 30 tablet, Refills: 1    Associated Diagnoses: Mixed anxiety and depressive disorder      rivaroxaban ANTICOAGULANT (XARELTO) 20 MG TABS tablet Take 1 tablet (20 mg) by mouth daily (with dinner)  Qty: 30 tablet, Refills: 11    Associated Diagnoses: Acute deep vein thrombosis (DVT) of popliteal vein of left lower extremity (H)      sertraline (ZOLOFT) 50 MG tablet Take 1 tablet (50 mg) by mouth daily  Qty: 30 tablet, Refills: 1    Associated Diagnoses: Mixed anxiety and depressive disorder      anastrozole (ARIMIDEX) 1 MG tablet Take 1 tablet (1 mg) by mouth daily for 28 days  Qty: 28 tablet, Refills: 0    Associated Diagnoses: Malignant neoplasm of overlapping sites of left breast in female, estrogen receptor positive (H); Carcinoma of left breast metastatic to bone (H)      naloxone (NARCAN) 4 MG/0.1ML nasal spray Spray 1 spray (4 mg) into one nostril alternating nostrils once as needed for opioid reversal every 2-3 minutes until assistance arrives  Qty: 0.2 mL, Refills: 0    Associated Diagnoses: Spine metastasis (H)      ondansetron (ZOFRAN-ODT) 4 MG ODT tab Take 1 tablet (4 mg) by mouth every 6 hours as needed for nausea or vomiting  Qty: 120 tablet, Refills: 0     Comments: Please take upto 6 hours daily as needed for nausea  Associated Diagnoses: Spine metastasis (H)      !! oxyCODONE (ROXICODONE) 5 MG tablet Take 1-2 tablets (5-10 mg) by mouth every 3 hours as needed for moderate to severe pain  Qty: 56 tablet, Refills: 0    Associated Diagnoses: Carcinoma of left breast metastatic to bone (H); Cancer associated pain      pantoprazole (PROTONIX) 40 MG EC tablet Take 1 tablet (40 mg) by mouth every morning (before breakfast)  Qty: 30 tablet, Refills: 1    Associated Diagnoses: Gastroesophageal reflux disease without esophagitis      polyethylene glycol (MIRALAX) 17 GM/Dose powder Take 17 g by mouth daily  Qty: 510 g, Refills: 1    Associated Diagnoses: Spine metastasis (H)       !! - Potential duplicate medications found. Please discuss with provider.        Allergies   No Known Allergies

## 2021-05-19 NOTE — PROGRESS NOTES
The patient is scheduled for clinic follow up within 24-48 hours of hospital discharge. No post-hospital call is needed at this time.    Name: Teresa Sanderson MRN: [id number]   Date: 5/19/2021 Status: [facility]   Time: 12:30 PM Length: 45   Visit Type: VIDEO VISIT RETURN [3548] LAURENCE: [id number]   Provider: Alee Yang PA-C Department:  ONCOLOGY ADULT         Kylie Leach CMA MEDRANO  Post Hospital Discharge Team

## 2021-05-20 ENCOUNTER — TELEPHONE (OUTPATIENT)
Dept: VASCULAR SURGERY | Facility: CLINIC | Age: 46
End: 2021-05-20

## 2021-05-20 NOTE — TELEPHONE ENCOUNTER
I called to find out how Teresa was doing after her kyphoplasty procedure with Dr Wiggins.  Her mailbox was full and I was unable to leave a message.  Plan is for 1 month f/up visit with Dr Wiggins.  AMELIA Dale RN, BSN  Interventional Radiology Nurse Coordinator   Phone:  [phone]

## 2021-05-26 PROBLEM — C50.812 MALIGNANT NEOPLASM OF OVERLAPPING SITES OF LEFT BREAST IN FEMALE, ESTROGEN RECEPTOR POSITIVE (H): Status: ACTIVE | Noted: 2020-12-05

## 2021-05-26 PROBLEM — Z17.0 MALIGNANT NEOPLASM OF OVERLAPPING SITES OF LEFT BREAST IN FEMALE, ESTROGEN RECEPTOR POSITIVE (H): Status: ACTIVE | Noted: 2020-12-05

## 2021-05-27 ENCOUNTER — TELEPHONE (OUTPATIENT)
Dept: ONCOLOGY | Facility: CLINIC | Age: 46
End: 2021-05-27

## 2021-05-27 ENCOUNTER — INFUSION THERAPY VISIT (OUTPATIENT)
Dept: ONCOLOGY | Facility: CLINIC | Age: 46
End: 2021-05-27
Attending: INTERNAL MEDICINE
Payer: COMMERCIAL

## 2021-05-27 VITALS
SYSTOLIC BLOOD PRESSURE: 119 MMHG | HEART RATE: 96 BPM | TEMPERATURE: 98.1 F | DIASTOLIC BLOOD PRESSURE: 78 MMHG | BODY MASS INDEX: 34.67 KG/M2 | OXYGEN SATURATION: 99 % | WEIGHT: 255.6 LBS | RESPIRATION RATE: 16 BRPM

## 2021-05-27 DIAGNOSIS — C50.912 CARCINOMA OF LEFT BREAST METASTATIC TO BONE (H): ICD-10-CM

## 2021-05-27 DIAGNOSIS — C79.51 CARCINOMA OF LEFT BREAST METASTATIC TO BONE (H): ICD-10-CM

## 2021-05-27 DIAGNOSIS — C50.919 METASTATIC BREAST CANCER: ICD-10-CM

## 2021-05-27 DIAGNOSIS — C79.51 SPINE METASTASIS: Primary | ICD-10-CM

## 2021-05-27 DIAGNOSIS — C50.812 MALIGNANT NEOPLASM OF OVERLAPPING SITES OF LEFT BREAST IN FEMALE, ESTROGEN RECEPTOR POSITIVE (H): ICD-10-CM

## 2021-05-27 DIAGNOSIS — Z17.0 MALIGNANT NEOPLASM OF OVERLAPPING SITES OF LEFT BREAST IN FEMALE, ESTROGEN RECEPTOR POSITIVE (H): ICD-10-CM

## 2021-05-27 LAB
ALBUMIN SERPL-MCNC: 3.5 G/DL (ref 3.4–5)
ALP SERPL-CCNC: 71 U/L (ref 40–150)
ALT SERPL W P-5'-P-CCNC: 16 U/L (ref 0–50)
ANION GAP SERPL CALCULATED.3IONS-SCNC: 7 MMOL/L (ref 3–14)
AST SERPL W P-5'-P-CCNC: 8 U/L (ref 0–45)
BASOPHILS # BLD AUTO: 0 10E9/L (ref 0–0.2)
BASOPHILS NFR BLD AUTO: 0 %
BILIRUB SERPL-MCNC: 0.3 MG/DL (ref 0.2–1.3)
BUN SERPL-MCNC: 14 MG/DL (ref 7–30)
CALCIUM SERPL-MCNC: 9.2 MG/DL (ref 8.5–10.1)
CANCER AG27-29 SERPL-ACNC: 63 U/ML (ref 0–39)
CEA SERPL-MCNC: 1.7 UG/L (ref 0–2.5)
CHLORIDE SERPL-SCNC: 113 MMOL/L (ref 94–109)
CO2 SERPL-SCNC: 24 MMOL/L (ref 20–32)
CREAT SERPL-MCNC: 1.12 MG/DL (ref 0.52–1.04)
DIFFERENTIAL METHOD BLD: ABNORMAL
EOSINOPHIL # BLD AUTO: 0.1 10E9/L (ref 0–0.7)
EOSINOPHIL NFR BLD AUTO: 1.7 %
ERYTHROCYTE [DISTWIDTH] IN BLOOD BY AUTOMATED COUNT: 13.3 % (ref 10–15)
GFR SERPL CREATININE-BSD FRML MDRD: 59 ML/MIN/{1.73_M2}
GLUCOSE SERPL-MCNC: 127 MG/DL (ref 70–99)
HCT VFR BLD AUTO: 35.1 % (ref 35–47)
HGB BLD-MCNC: 11.5 G/DL (ref 11.7–15.7)
LYMPHOCYTES # BLD AUTO: 1 10E9/L (ref 0.8–5.3)
LYMPHOCYTES NFR BLD AUTO: 17.4 %
MCH RBC QN AUTO: 32.1 PG (ref 26.5–33)
MCHC RBC AUTO-ENTMCNC: 32.8 G/DL (ref 31.5–36.5)
MCV RBC AUTO: 98 FL (ref 78–100)
MONOCYTES # BLD AUTO: 0.2 10E9/L (ref 0–1.3)
MONOCYTES NFR BLD AUTO: 4.3 %
NEUTROPHILS # BLD AUTO: 4.2 10E9/L (ref 1.6–8.3)
NEUTROPHILS NFR BLD AUTO: 76.6 %
PLATELET # BLD AUTO: 306 10E9/L (ref 150–450)
PLATELET # BLD EST: ABNORMAL 10*3/UL
POIKILOCYTOSIS BLD QL SMEAR: SLIGHT
POTASSIUM SERPL-SCNC: 3.8 MMOL/L (ref 3.4–5.3)
PROT SERPL-MCNC: 7.4 G/DL (ref 6.8–8.8)
RBC # BLD AUTO: 3.58 10E12/L (ref 3.8–5.2)
SODIUM SERPL-SCNC: 144 MMOL/L (ref 133–144)
WBC # BLD AUTO: 5.5 10E9/L (ref 4–11)

## 2021-05-27 PROCEDURE — 86300 IMMUNOASSAY TUMOR CA 15-3: CPT | Performed by: PHYSICIAN ASSISTANT

## 2021-05-27 PROCEDURE — 80053 COMPREHEN METABOLIC PANEL: CPT | Performed by: PHYSICIAN ASSISTANT

## 2021-05-27 PROCEDURE — 250N000011 HC RX IP 250 OP 636: Performed by: INTERNAL MEDICINE

## 2021-05-27 PROCEDURE — 85025 COMPLETE CBC W/AUTO DIFF WBC: CPT | Performed by: PHYSICIAN ASSISTANT

## 2021-05-27 PROCEDURE — 36415 COLL VENOUS BLD VENIPUNCTURE: CPT

## 2021-05-27 PROCEDURE — 82378 CARCINOEMBRYONIC ANTIGEN: CPT | Performed by: PHYSICIAN ASSISTANT

## 2021-05-27 PROCEDURE — 96402 CHEMO HORMON ANTINEOPL SQ/IM: CPT

## 2021-05-27 RX ADMIN — GOSERELIN ACETATE 3.6 MG: 3.6 IMPLANT SUBCUTANEOUS at 13:36

## 2021-05-27 ASSESSMENT — PAIN SCALES - GENERAL: PAINLEVEL: WORST PAIN (10)

## 2021-05-27 NOTE — PROGRESS NOTES
Infusion Nursing Note:  Teresa Sanderson presents today for Zoladex.  Patient seen by provider today: No   present during visit today: Not Applicable.    Note: Teresa arrives to infusion today doing OK. She continues to have 10/10 low back pain after back surgery and is working with surgery to manage the pain. She denies further pain intervention today. She otherwise offers no new concerns today and will follow-up with her HAYDE Kim next week.      Post Infusion Assessment:  Patient tolerated injection without incident.  Zoladex given subcutaneously in left side abdomen.     Discharge Plan:   Patient declined prescription refills.  Copy of AVS reviewed with patient.  Patient will return 6/3 for next appointment.  Patient discharged in stable condition accompanied by: self.  Departure Mode: Ambulatory.    Rut Ugalde RN

## 2021-05-27 NOTE — NURSING NOTE
Chief Complaint   Patient presents with     Blood Draw     Labs drawn via VPT by RN in lab. VS taken.     Labs collected from venipuncture by RN. Vitals taken. Checked in for appointment(s).    Roxy DAWKINS RN PHN BSN  BMT/Oncology Lab

## 2021-05-27 NOTE — PATIENT INSTRUCTIONS
Contact Numbers  [facility]: [phone] (for symptom and scheduling needs)    Please call the [facility] Triage line if you experience a temperature greater than or equal to 100.4, shaking chills, have uncontrolled nausea, vomiting and/or diarrhea, dizziness, shortness of breath, chest pain, bleeding, unexplained bruising, or if you have any other new/concerning symptoms, questions or concerns.     If you are having any concerning symptoms or wish to speak to a provider before your next infusion visit, please call your care coordinator or triage to notify them so we can adequately serve you.     If you need a refill on a narcotic prescription or other medication, please call triage before your infusion appointment.        Lab Results:  Recent Results (from the past 12 hour(s))   Comprehensive metabolic panel    Collection Time: 05/27/21  1:05 PM   Result Value Ref Range    Sodium 144 133 - 144 mmol/L    Potassium 3.8 3.4 - 5.3 mmol/L    Chloride 113 (H) 94 - 109 mmol/L    Carbon Dioxide 24 20 - 32 mmol/L    Anion Gap 7 3 - 14 mmol/L    Glucose 127 (H) 70 - 99 mg/dL    Urea Nitrogen 14 7 - 30 mg/dL    Creatinine 1.12 (H) 0.52 - 1.04 mg/dL    GFR Estimate 59 (L) >60 mL/min/[1.73_m2]    GFR Estimate If Black 68 >60 mL/min/[1.73_m2]    Calcium 9.2 8.5 - 10.1 mg/dL    Bilirubin Total 0.3 0.2 - 1.3 mg/dL    Albumin 3.5 3.4 - 5.0 g/dL    Protein Total 7.4 6.8 - 8.8 g/dL    Alkaline Phosphatase 71 40 - 150 U/L    ALT 16 0 - 50 U/L    AST 8 0 - 45 U/L   CBC with platelets differential    Collection Time: 05/27/21  1:05 PM   Result Value Ref Range    WBC 5.5 4.0 - 11.0 10e9/L    RBC Count 3.58 (L) 3.8 - 5.2 10e12/L    Hemoglobin 11.5 (L) 11.7 - 15.7 g/dL    Hematocrit 35.1 35.0 - 47.0 %    MCV 98 78 - 100 fl    MCH 32.1 26.5 - 33.0 pg    MCHC 32.8 31.5 - 36.5 g/dL    RDW 13.3 10.0 - 15.0 %    Platelet Count 306 150 - 450 10e9/L    Diff Method PENDING

## 2021-05-27 NOTE — ORAL ONC MGMT
Oral Chemotherapy Monitoring Program    Placed call to patient in follow up of palbociclib oral therapy.    Mobile voicemail was full; called home number and left message to please call back in follow up of therapy. No patient or drug names were mentioned.    Shazia Javed  Pharmacy Intern  [facility]  [phone]

## 2021-06-01 ENCOUNTER — VIRTUAL VISIT (OUTPATIENT)
Dept: ONCOLOGY | Facility: CLINIC | Age: 46
End: 2021-06-01
Attending: PHYSICIAN ASSISTANT
Payer: COMMERCIAL

## 2021-06-01 DIAGNOSIS — N63.0 BREAST MASS: ICD-10-CM

## 2021-06-01 DIAGNOSIS — C79.51 METASTASIS TO BONE (H): ICD-10-CM

## 2021-06-01 DIAGNOSIS — C50.812 MALIGNANT NEOPLASM OF OVERLAPPING SITES OF LEFT BREAST IN FEMALE, ESTROGEN RECEPTOR POSITIVE (H): ICD-10-CM

## 2021-06-01 DIAGNOSIS — Z17.0 MALIGNANT NEOPLASM OF OVERLAPPING SITES OF LEFT BREAST IN FEMALE, ESTROGEN RECEPTOR POSITIVE (H): ICD-10-CM

## 2021-06-01 DIAGNOSIS — G89.3 CANCER ASSOCIATED PAIN: ICD-10-CM

## 2021-06-01 DIAGNOSIS — C50.912 CARCINOMA OF LEFT BREAST METASTATIC TO BONE (H): ICD-10-CM

## 2021-06-01 DIAGNOSIS — C79.51 CARCINOMA OF LEFT BREAST METASTATIC TO BONE (H): ICD-10-CM

## 2021-06-01 DIAGNOSIS — I82.432 ACUTE DEEP VEIN THROMBOSIS (DVT) OF POPLITEAL VEIN OF LEFT LOWER EXTREMITY (H): ICD-10-CM

## 2021-06-01 DIAGNOSIS — M79.661 PAIN OF RIGHT LOWER LEG: ICD-10-CM

## 2021-06-01 DIAGNOSIS — C50.919 MALIGNANT NEOPLASM OF FEMALE BREAST, UNSPECIFIED ESTROGEN RECEPTOR STATUS, UNSPECIFIED LATERALITY, UNSPECIFIED SITE OF BREAST (H): ICD-10-CM

## 2021-06-01 DIAGNOSIS — C79.51 SPINE METASTASIS: ICD-10-CM

## 2021-06-01 DIAGNOSIS — C50.919 METASTATIC BREAST CANCER: Primary | ICD-10-CM

## 2021-06-01 PROCEDURE — 99215 OFFICE O/P EST HI 40 MIN: CPT | Mod: 95 | Performed by: PHYSICIAN ASSISTANT

## 2021-06-01 PROCEDURE — 999N001193 HC VIDEO/TELEPHONE VISIT; NO CHARGE

## 2021-06-01 RX ORDER — OXYCODONE HYDROCHLORIDE 5 MG/1
5-10 TABLET ORAL
Qty: 56 TABLET | Refills: 0 | Status: SHIPPED | OUTPATIENT
Start: 2021-06-01 | End: 2021-06-23

## 2021-06-01 RX ORDER — MORPHINE SULFATE 15 MG/1
15 TABLET, FILM COATED, EXTENDED RELEASE ORAL EVERY 12 HOURS
Qty: 60 TABLET | Refills: 0 | Status: SHIPPED | OUTPATIENT
Start: 2021-06-01 | End: 2021-06-04

## 2021-06-01 RX ORDER — METHOCARBAMOL 500 MG/1
500 TABLET, FILM COATED ORAL 4 TIMES DAILY
Qty: 120 TABLET | Refills: 0 | Status: SHIPPED | OUTPATIENT
Start: 2021-06-01 | End: 2021-07-30

## 2021-06-01 RX ORDER — GABAPENTIN 300 MG/1
CAPSULE ORAL
Qty: 150 CAPSULE | Refills: 0 | Status: SHIPPED | OUTPATIENT
Start: 2021-06-01 | End: 2021-07-20

## 2021-06-01 NOTE — PROGRESS NOTES
Teresa is a 45 year old who is being evaluated via a billable video visit.      How would you like to obtain your AVS? MyChart  If the video visit is dropped, the invitation should be resent by: Text to cell phone: [phone]  Will anyone else be joining your video visit? No      I have reviewed and updated the patient's allergies and medication list.    Concerns: Would like Morphine.   Refills: Oxycodone, Methocarbamol, Xarelto,  And gabapentin.       Vitals - Patient Reported  Weight (Patient Reported): 115.9 kg (255 lb 8.2 oz)  Height (Patient Reported): 182.9 cm (6')  BMI (Based on Pt Reported Ht/Wt): 34.65  Pain Score: Worst Pain (10)  Pain Loc: Low Back    Aisha Daly CMA      Video Start Time: 3:30 PM  Video-Visit Details    Type of service:  Video Visit    Video End Time:3:47 PM    Originating Location (pt. Location): Home    Distant Location (provider location):  [facility]     Platform used for Video Visit: [facility]      Oncology Visit:   Date on this visit: Jun 1, 2021    Diagnosis:  ER positive left breast cancer metastasized to bones.     Primary Physician: No Ref-Primary, Physician     History Of Present Illness:    Ms. Sanderson is a 45 year old female with a h/o tobacco abuse and DVTs with left breast cancer metastasized to bone. She presented to [place] ED with back pain on 12/5/2020. MRI of the L-spine showed an abnormal L4 lesion with associated right paraspinal mass, abnormalities in L5 and the left iliac bone were also seen. CT C/A/P showed a left breast mass, lytic lesions of T7, L4, and the pelvis, and a 3 cm lesion in the kidney (thought to be a cyst). Ultrasound of the bilateral lower extremities showed a non-occlusive thrombus in the left popliteal vein. Mammogram and ultrasound of the bilateral breasts on 12/17/2020 showed a spiculated mass measuring at least 7.8 cm at 12-1:00 left breast extending from the nipple to 9 cm from the nipple with associated  "nipple retraction. This mass was biopsied, and showed IDC with surrounding DCIS, grade 3, ER+ 90%, and NV+ 75%.  HER2 was equivocal in approximately 35% of tumor cells by FISH and was negative by IHC.    Metastatic Breast Cancer Treatment:  12/23/2021 - 1/7/2021  Radiation (3000 cGy) to the lumbar spine.  1/29/2021 - present  Ibrance, zoladex, and anastrozole.  5/17/21- Radiofrequency ablation, keloplasty/segmental plasty of L4     Interval History: Teresa is feeling poorly. She has had sharp all over body pain for the past 2-3 days. Has pain in her shoulder, low back, and R leg and \"all over\". She ran out of morphine a few days ago. She has been using oxycodone 10 mg along with tylenol every 4 hours as well as gabapentin. She almost went to ED for unmanageable pain. She notes procedure on 5/17 resolved the n/t in her R leg but pain remains unchanged. She has not noticed any further R leg weakness but she is also more careful with walking.     She denies any fevers/chills/sweats. No n/v or diarrhea. No cough, sore throat, or congestion. No CP or SOB.     She has had a loss of appetite for 2-3 weeks now. She is constipated and had been taking senna and miralax PRN. Took a Senna this morning and last night. Urinating okay but she is aware she is dehydrated.     Past Medical/Surgical History:   Past Medical History:   Diagnosis Date     Anxiety      Breast CA (H) 12/2020     Depression      DVT (deep venous thrombosis) (H) 2014     Left breast mass     x approximately 4-5 months     Pulmonary emboli (H)      Pyelonephritis      Schizoaffective disorder (H)      Tobacco use      Past Surgical History:   Procedure Laterality Date     IR LUMBAR KYPHOPLASTY VERTEBRAE  5/17/2021     TUBAL LIGATION  1998      No Known Allergies    Current Outpatient Medications   Medication     anastrozole (ARIMIDEX) 1 MG tablet     gabapentin (NEURONTIN) 300 MG capsule     methocarbamol (ROBAXIN) 500 MG tablet     naloxone (NARCAN) 4 " MG/0.1ML nasal spray     ondansetron (ZOFRAN-ODT) 4 MG ODT tab     oxyCODONE (ROXICODONE) 5 MG tablet     palbociclib (IBRANCE) 125 MG tablet     pantoprazole (PROTONIX) 40 MG EC tablet     polyethylene glycol (MIRALAX) 17 GM/Dose powder     prochlorperazine (COMPAZINE) 10 MG tablet     QUEtiapine (SEROQUEL) 100 MG tablet     rivaroxaban ANTICOAGULANT (XARELTO) 20 MG TABS tablet     SENNA-docusate sodium (SENNA S) 8.6-50 MG tablet     sertraline (ZOLOFT) 50 MG tablet     No current facility-administered medications for this visit.    '    Physical Exam:   Video physical exam  General: Patient appears well in no acute distress.   Skin: No visualized rash or lesions on visualized skin  Eyes: EOMI, no erythema, sclera icterus or discharge noted  Resp: Appears to be breathing comfortably without accessory muscle usage, speaking in full sentences, no cough  MSK: Appears to have normal range of motion based on visualized movements  Neurologic: No apparent tremors, facial movements symmetric  Psych: affect and mood congruent, alert and oriented    The rest of a comprehensive physical examination is deferred due to PHE (public health emergency) video restrictions        Laboratory/Imaging Studies    5/27/2021 13:05   Sodium 144   Potassium 3.8   Chloride 113 (H)   Carbon Dioxide 24   Urea Nitrogen 14   Creatinine 1.12 (H)   GFR Estimate 59 (L)   GFR Estimate If Black 68   Calcium 9.2   Anion Gap 7   Albumin 3.5   Protein Total 7.4   Bilirubin Total 0.3   Alkaline Phosphatase 71   ALT 16   AST 8   CA 27-29 63 (H)   Glucose 127 (H)   WBC 5.5   Hemoglobin 11.5 (L)   Hematocrit 35.1   Platelet Count 306   RBC Count 3.58 (L)   MCV 98   MCH 32.1   MCHC 32.8   RDW 13.3   Diff Method Manual Differential   % Neutrophils 76.6   % Lymphocytes 17.4   % Monocytes 4.3   % Eosinophils 1.7   % Basophils 0.0   Absolute Neutrophil 4.2   Absolute Lymphocytes 1.0   Absolute Monocytes 0.2   Absolute Eosinophils 0.1   Absolute Basophils 0.0    Poikilocytosis Slight   Platelet Estimate Confirming automated cell count   CEA 1.7      3/1/2021 09:39 3/25/2021 11:08 4/20/2021 10:04 5/13/2021 11:09 5/27/2021 13:05   CA 27-29 61 (H) 65 (H) 71 (H) 59 (H) 63 (H)   CEA 3.3 (H) 3.8 (H) 3.5 (H) 1.8 1.7       ASSESSMENT/PLAN:   45 year old female with history of DVT with left breast invasive ductal carcinoma, ER/CT positive, HER2 negative metastasized to bones.    1.  Metastatic breast cancer:  On treatment with Ibrance, zoladex, and anastrozole for approximately 4 months. She had initial improvement in markers and then had slow rise through April with some decrease last month. Her CEA has now normalized. Will need to continue to stay consistent with zoladex dosing. This was last given last week. She missed her PET/CT on 5/18 due to post-IR procedure monitoring. I will reschedule this ASAP as her pain continues to be substantial, and she had up trend in CA 27-29 last week. Will plan for close follow-up to review.     2.  Bone metastases:  She is s/p XRT to the lumbar spine.  She reports ongoing pain and continues to take MS contin 15 mg PO BID and oxycodone prn.  On Zometa since 1/18/2021 and is receiving once every 3 months. Last given 4/20/21.     3.  Pain control: RLE paraesthesia resolved with procedure but pain is the same.  Continues on MS contin, oxycodone, robaxin, and gabapentin. She has been out of MS Contin so I will refill this. If pain is not controlled once back on this regimen, she should let us know. Will refer to palliative care as well to help with pain management.     4.  DVT:  Recommend continuing Xarelto until contraindicated given metastatic disease.      5.  Hot flashes/anxiety:  Continue Zoloft 50 mg PO daily and Seroquel 100 mg PO at bedtime.     40 minutes spent on the date of the encounter doing chart review, review of test results, interpretation of tests, patient visit and documentation     Alee Yang PA-C

## 2021-06-01 NOTE — LETTER
6/1/2021         RE: Teresa Sanderson  [address] [address]        Dear Colleague,    Thank you for referring your patient, Teresa Sanderson, to the [facility]. Please see a copy of my visit note below.    Teresa is a 45 year old who is being evaluated via a billable video visit.      How would you like to obtain your AVS? MyChart  If the video visit is dropped, the invitation should be resent by: Text to cell phone: [phone]  Will anyone else be joining your video visit? No      I have reviewed and updated the patient's allergies and medication list.    Concerns: Would like Morphine.   Refills: Oxycodone, Methocarbamol, Xarelto,  And gabapentin.       Vitals - Patient Reported  Weight (Patient Reported): 115.9 kg (255 lb 8.2 oz)  Height (Patient Reported): 182.9 cm (6')  BMI (Based on Pt Reported Ht/Wt): 34.65  Pain Score: Worst Pain (10)  Pain Loc: Low Back    Aisha Daly CMA      Video Start Time: 3:30 PM  Video-Visit Details    Type of service:  Video Visit    Video End Time:3:47 PM    Originating Location (pt. Location): Home    Distant Location (provider location):  [facility]     Platform used for Video Visit: [employer]      Oncology Visit:   Date on this visit: Jun 1, 2021    Diagnosis:  ER positive left breast cancer metastasized to bones.     Primary Physician: No Ref-Primary, Physician     History Of Present Illness:    Ms. Sanderson is a 45 year old female with a h/o tobacco abuse and DVTs with left breast cancer metastasized to bone. She presented to [place] ED with back pain on 12/5/2020. MRI of the L-spine showed an abnormal L4 lesion with associated right paraspinal mass, abnormalities in L5 and the left iliac bone were also seen. CT C/A/P showed a left breast mass, lytic lesions of T7, L4, and the pelvis, and a 3 cm lesion in the kidney (thought to be a cyst). Ultrasound of the bilateral lower extremities  "showed a non-occlusive thrombus in the left popliteal vein. Mammogram and ultrasound of the bilateral breasts on 12/17/2020 showed a spiculated mass measuring at least 7.8 cm at 12-1:00 left breast extending from the nipple to 9 cm from the nipple with associated nipple retraction. This mass was biopsied, and showed IDC with surrounding DCIS, grade 3, ER+ 90%, and NC+ 75%.  HER2 was equivocal in approximately 35% of tumor cells by FISH and was negative by IHC.    Metastatic Breast Cancer Treatment:  12/23/2021 - 1/7/2021  Radiation (3000 cGy) to the lumbar spine.  1/29/2021 - present  Ibrance, zoladex, and anastrozole.  5/17/21- Radiofrequency ablation, keloplasty/segmental plasty of L4     Interval History: Teresa is feeling poorly. She has had sharp all over body pain for the past 2-3 days. Has pain in her shoulder, low back, and R leg and \"all over\". She ran out of morphine a few days ago. She has been using oxycodone 10 mg along with tylenol every 4 hours as well as gabapentin. She almost went to ED for unmanageable pain. She notes procedure on 5/17 resolved the n/t in her R leg but pain remains unchanged. She has not noticed any further R leg weakness but she is also more careful with walking.     She denies any fevers/chills/sweats. No n/v or diarrhea. No cough, sore throat, or congestion. No CP or SOB.     She has had a loss of appetite for 2-3 weeks now. She is constipated and had been taking senna and miralax PRN. Took a Senna this morning and last night. Urinating okay but she is aware she is dehydrated.     Past Medical/Surgical History:   Past Medical History:   Diagnosis Date     Anxiety      Breast CA (H) 12/2020     Depression      DVT (deep venous thrombosis) (H) 2014     Left breast mass     x approximately 4-5 months     Pulmonary emboli (H)      Pyelonephritis      Schizoaffective disorder (H)      Tobacco use      Past Surgical History:   Procedure Laterality Date     IR LUMBAR KYPHOPLASTY " VERTEBRAE  5/17/2021     TUBAL LIGATION  1998      No Known Allergies    Current Outpatient Medications   Medication     anastrozole (ARIMIDEX) 1 MG tablet     gabapentin (NEURONTIN) 300 MG capsule     methocarbamol (ROBAXIN) 500 MG tablet     naloxone (NARCAN) 4 MG/0.1ML nasal spray     ondansetron (ZOFRAN-ODT) 4 MG ODT tab     oxyCODONE (ROXICODONE) 5 MG tablet     palbociclib (IBRANCE) 125 MG tablet     pantoprazole (PROTONIX) 40 MG EC tablet     polyethylene glycol (MIRALAX) 17 GM/Dose powder     prochlorperazine (COMPAZINE) 10 MG tablet     QUEtiapine (SEROQUEL) 100 MG tablet     rivaroxaban ANTICOAGULANT (XARELTO) 20 MG TABS tablet     SENNA-docusate sodium (SENNA S) 8.6-50 MG tablet     sertraline (ZOLOFT) 50 MG tablet     No current facility-administered medications for this visit.    '    Physical Exam:   Video physical exam  General: Patient appears well in no acute distress.   Skin: No visualized rash or lesions on visualized skin  Eyes: EOMI, no erythema, sclera icterus or discharge noted  Resp: Appears to be breathing comfortably without accessory muscle usage, speaking in full sentences, no cough  MSK: Appears to have normal range of motion based on visualized movements  Neurologic: No apparent tremors, facial movements symmetric  Psych: affect and mood congruent, alert and oriented    The rest of a comprehensive physical examination is deferred due to PHE (public health emergency) video restrictions        Laboratory/Imaging Studies    5/27/2021 13:05   Sodium 144   Potassium 3.8   Chloride 113 (H)   Carbon Dioxide 24   Urea Nitrogen 14   Creatinine 1.12 (H)   GFR Estimate 59 (L)   GFR Estimate If Black 68   Calcium 9.2   Anion Gap 7   Albumin 3.5   Protein Total 7.4   Bilirubin Total 0.3   Alkaline Phosphatase 71   ALT 16   AST 8   CA 27-29 63 (H)   Glucose 127 (H)   WBC 5.5   Hemoglobin 11.5 (L)   Hematocrit 35.1   Platelet Count 306   RBC Count 3.58 (L)   MCV 98   MCH 32.1   MCHC 32.8   RDW 13.3    Diff Method Manual Differential   % Neutrophils 76.6   % Lymphocytes 17.4   % Monocytes 4.3   % Eosinophils 1.7   % Basophils 0.0   Absolute Neutrophil 4.2   Absolute Lymphocytes 1.0   Absolute Monocytes 0.2   Absolute Eosinophils 0.1   Absolute Basophils 0.0   Poikilocytosis Slight   Platelet Estimate Confirming automated cell count   CEA 1.7      3/1/2021 09:39 3/25/2021 11:08 4/20/2021 10:04 5/13/2021 11:09 5/27/2021 13:05   CA 27-29 61 (H) 65 (H) 71 (H) 59 (H) 63 (H)   CEA 3.3 (H) 3.8 (H) 3.5 (H) 1.8 1.7       ASSESSMENT/PLAN:   45 year old female with history of DVT with left breast invasive ductal carcinoma, ER/ID positive, HER2 negative metastasized to bones.    1.  Metastatic breast cancer:  On treatment with Ibrance, zoladex, and anastrozole for approximately 4 months. She had initial improvement in markers and then had slow rise through April with some decrease last month. Her CEA has now normalized. Will need to continue to stay consistent with zoladex dosing. This was last given last week. She missed her PET/CT on 5/18 due to post-IR procedure monitoring. I will reschedule this ASAP as her pain continues to be substantial, and she had up trend in CA 27-29 last week. Will plan for close follow-up to review.     2.  Bone metastases:  She is s/p XRT to the lumbar spine.  She reports ongoing pain and continues to take MS contin 15 mg PO BID and oxycodone prn.  On Zometa since 1/18/2021 and is receiving once every 3 months. Last given 4/20/21.     3.  Pain control: RLE paraesthesia resolved with procedure but pain is the same.  Continues on MS contin, oxycodone, robaxin, and gabapentin. She has been out of MS Contin so I will refill this. If pain is not controlled once back on this regimen, she should let us know. Will refer to palliative care as well to help with pain management.     4.  DVT:  Recommend continuing Xarelto until contraindicated given metastatic disease.      5.  Hot flashes/anxiety:   Continue Zoloft 50 mg PO daily and Seroquel 100 mg PO at bedtime.     40 minutes spent on the date of the encounter doing chart review, review of test results, interpretation of tests, patient visit and documentation     Alee Yang PA-C       Again, thank you for allowing me to participate in the care of your patient.        Sincerely,        Alee Yang PA-C

## 2021-06-03 NOTE — PROGRESS NOTES
"Teresa is a 45 year old who is being evaluated via a billable video visit.      How would you like to obtain your AVS? Mail a copy  If the video visit is dropped, the invitation should be resent by: Text to cell phone: [phone]   Will anyone else be joining your video visit? No     Monae Nieto CMA      Palliative Care Clinic Consult Note    Patient Name: Teresa Sanderson  Primary Provider: No Ref-Primary, Physician    Chief Complaint: Pain, coping    Summary: 44 yo F with metastatic breast cancer.  - Presented with back pain Dec. 2020, found to have lesions at L4, L5, and left iliac bone as well as paraspinal mass. Additional imaging revealed left breast mass and additional lesions in spine/pelvis. +DVT in L popliteal vein.  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  - s/p XRT to Lumbar spine (finished Jan 2021).  - Jan 2021 started Ibrance, zoladex, and anastrozole.   - 5/17/21- s/p radiofrequency ablation, keloplasty/segmental plasty of L4     Reviewed: Yes, no concerns. Oxycodone 5mg tablets filled by Oncology.    History of Present Illness:  Teresa Sanderson is a 45 year old female who is seen for a new consult via Video visit today with Palliative Care.      Pain:  \"All over\" pain. Primarily located in shoulder, low back, and R leg.  Describes as deep ache.  Cannot get comfortable, as in pain with lying down as well as sitting up.  Seems to have some weakness associated with this.  Was on MSContin 15mg BID but ran out last week. Using oxycodone 10 mg 4-5x daily with tylenol 1,350mg 4-5x daily as well as gabapentin (600mg, 300mg, 600mg).    Appetite/Nausea: Appetite has been okay.  No nausea.  Sometimes does have to force herself to eat if she is uncomfortable.     Bowels: 1 episode of constipation. Has been using miralax and apple juice or orange juice PRN, senna if severe.     Sleep: Struggling with sleep, primarily because of pain.  However " "has had some trouble sleeping some time, has been taking Seroquel 100 mg at bedtime.     Mood: On zoloft 50mg daily and seroquel 100mg at bedtime.  Struggling with being a burden to her family as well as the suddenness of her diagnosis.  She does describe a history of schizoaffective disorder with some episodes of severe psychosis, most recently in 2019.     Coping:  Is not connected with a therapist or counselor.  Due to her limitations from pain, her 22-year-old daughter is having to come over and help her with most household chores.  She is really struggling with her daughter having to take up her own time to help work on especially 1 daughter currently living additional housing.  She knows that the attention of her cancer treatment is palliative in nature and that is not curable.     Social History:  Name is pronounced \"Johan\".  Moved back to [place] about a year ago from [place], [place].  Lives in two-story house with her cousin, though one sister is staying with her for the summer.   Social History     Tobacco Use     Smoking status: Current Every Day Smoker     Packs/day: 0.25     Types: Cigarettes     Start date: 5/13/2011     Smokeless tobacco: Never Used   Substance Use Topics     Alcohol use: Not Currently     Comment: occ     Drug use: Yes     Types: Marijuana     Comment: occ     No Known Allergies    Advanced Care Planning: Did not discuss today due to high symptom burden.    Medications- Reviewed in Epic.    Past Medical History- Reviewed in [facility].    Past Surgical History- Reviewed in Epic.    Review of Systems:   ROS: 10 point ROS neg other than the symptoms noted above in the HPI.    Physical Exam:   Constitutional: Alert, pleasant, no apparent distress. Sitting up in chair.  Eyes: Sclera non-icteric, no eye discharge.  ENT: No nasal discharge. Ears grossly normal.  Respiratory: Unlabored respirations. Speaking in full sentences.  Musculoskeletal: Extremities appear normal- no gross deformities " noted. No edema noted on upper body.   Skin: No suspicious lesions or rashes on visible skin.  Neurologic: Clear speech, no aphasia. No facial droop.  Psychiatric: Mentation appears normal, appropriate attention. Affect normal/bright. Does not appear anxious or depressed.      Key Data Reviewed:  LABS: 5/27/21- Cr 1.12 (increased), GFR 68, Albumin 3.5, LFTs wnl. WBC 5.5, Hgb 11.5, Plts 306. CA 27-29 of 63 (trending up).     IMAGING: MRI Lumbar Spine 3/11/21- Impression: Multiple enhancing bone lesions consistent with metastatic disease. The lesion at sacral S1 is smaller compared with prior MRI from December 2020, likely representing positive response to treatment. However other lesions such as seen in L3 and L2 vertebral bodies are more conspicuous in today's exam, could represent local mild progression or better seen in today's exam due to improved MRI quality. Stable mild spinal canal narrowing and bilateral mild neural foraminal narrowing at L4-5. No leptomeningeal enhancement.    MRI R Hip 3/21/21- IMPRESSION:  1.  No evidence for proximal right femur fracture, femoral neck stress fracture or classic femoral head AVN.  2.  Mild right hip chondrosis with probably a small anterior labral tear.  3.  Mild right greater trochanteric bursitis/distal gluteal peritendinitis.    Opioid Risk Tool (ORT):    Family History of Substance Abuse:        Alcohol = 0 pt (no)       Illegal Drugs = 3 pts (yes, male)       Prescription Drugs = 0 pt (no)      Personal History of Substance Abuse:       Alcohol = 0 pt (no)       Illegal Drugs = 0 pt (no)       Prescription Drugs = 0 pt (no)        Age: 1 pt (age 16-45)      History of Pre-adolescent Sexual Abuse: 0 pt (no)      Psychological Disease: 2 pts (ADD, OCD, bipolar, schizophrenia) - schizoaffective disorder      ORT Score = 6, due to age, family hx of drug use, and schizoaffective hx       0-3: Low risk for opioid abuse       4-7: Moderate risk for opioid abuse       >/= 8:  High risk for opioid abuse      Impression & Recommendations & Counseling:  Teresa Sanderson is a 45 year old female with history of breast cancer metastatic to bone, s/p XRT to spine as well as radiofrequency ablation, keloplasty/segmental plasty of L4.     Cancer-related pain - Severe pain that is significantly affecting her quality of life. Asked Oncology about additional steroids, however, she did not have much of a response in the past.  - PC will take over prescribing opioids.  - Increase MSContin to 30mg Q12H  - Continue Oxycodone 5-10mg Q3H PRN  - Discussed decreasing Tylenol to 1350 three times daily.  - PC RN to check on pain control next week.    Bowels - Refilled bowel meds today. Currently bowels are stable.    Mood - Having some depression and struggling with coping with illness and concern of being burden to family.  - Increase zoloft over the the next two weeks to 100mg daily.  - Referral placed to [facility]  - Also sent note to Oncology SW re: patient's questions about PCA services.  - Will keep an eye on her mood- she has hx of schizoaffective disorder and was treated for psychosis in the past (last in 2019).    Difficulty sleeping - Primarily pain related, but has been a longstanding issue too it sounds like.  - Trial of doxepin 3-6mg at bedtime.    Follow up: One month    Video-Visit Details  Video Start Time: 12:32Pm  Video End Time: 1:15PM    Originating Location (pt. Location): Home     Distant Location (provider location):  [facility]     Platform used for Video Visit: [employer]    Total time spent on day of encounter is 99 mins, including reviewing record, review of above studies, above visit with patient, and documentation.     Ayanna Tellez DO  Palliative Medicine   Pager [phone], AMCOM ID [id number]

## 2021-06-04 ENCOUNTER — PRE VISIT (OUTPATIENT)
Dept: ONCOLOGY | Facility: CLINIC | Age: 46
End: 2021-06-04

## 2021-06-04 ENCOUNTER — TELEPHONE (OUTPATIENT)
Dept: ONCOLOGY | Facility: CLINIC | Age: 46
End: 2021-06-04

## 2021-06-04 ENCOUNTER — VIRTUAL VISIT (OUTPATIENT)
Dept: ONCOLOGY | Facility: CLINIC | Age: 46
End: 2021-06-04
Attending: STUDENT IN AN ORGANIZED HEALTH CARE EDUCATION/TRAINING PROGRAM
Payer: COMMERCIAL

## 2021-06-04 DIAGNOSIS — F41.8 MIXED ANXIETY AND DEPRESSIVE DISORDER: ICD-10-CM

## 2021-06-04 DIAGNOSIS — T40.2X5A THERAPEUTIC OPIOID INDUCED CONSTIPATION: ICD-10-CM

## 2021-06-04 DIAGNOSIS — G89.3 CANCER ASSOCIATED PAIN: ICD-10-CM

## 2021-06-04 DIAGNOSIS — R45.89 DIFFICULTY COPING: Primary | ICD-10-CM

## 2021-06-04 DIAGNOSIS — G47.9 DIFFICULTY SLEEPING: ICD-10-CM

## 2021-06-04 DIAGNOSIS — Z51.5 ENCOUNTER FOR PALLIATIVE CARE: ICD-10-CM

## 2021-06-04 DIAGNOSIS — C79.51 METASTASIS TO BONE (H): ICD-10-CM

## 2021-06-04 DIAGNOSIS — C79.51 CARCINOMA OF LEFT BREAST METASTATIC TO BONE (H): ICD-10-CM

## 2021-06-04 DIAGNOSIS — K59.03 THERAPEUTIC OPIOID INDUCED CONSTIPATION: ICD-10-CM

## 2021-06-04 DIAGNOSIS — C50.912 CARCINOMA OF LEFT BREAST METASTATIC TO BONE (H): ICD-10-CM

## 2021-06-04 PROCEDURE — 99205 OFFICE O/P NEW HI 60 MIN: CPT | Mod: 95 | Performed by: STUDENT IN AN ORGANIZED HEALTH CARE EDUCATION/TRAINING PROGRAM

## 2021-06-04 PROCEDURE — 99417 PROLNG OP E/M EACH 15 MIN: CPT | Performed by: STUDENT IN AN ORGANIZED HEALTH CARE EDUCATION/TRAINING PROGRAM

## 2021-06-04 RX ORDER — SENNA AND DOCUSATE SODIUM 50; 8.6 MG/1; MG/1
2 TABLET, FILM COATED ORAL 2 TIMES DAILY PRN
Qty: 100 TABLET | Refills: 3 | Status: SHIPPED | OUTPATIENT
Start: 2021-06-04 | End: 2022-05-13

## 2021-06-04 RX ORDER — DOXEPIN HYDROCHLORIDE 10 MG/ML
3-6 SOLUTION ORAL AT BEDTIME
Qty: 60 ML | Refills: 1 | Status: SHIPPED | OUTPATIENT
Start: 2021-06-04 | End: 2021-06-11

## 2021-06-04 RX ORDER — POLYETHYLENE GLYCOL 3350 17 G/17G
17 POWDER, FOR SOLUTION ORAL DAILY PRN
Qty: 578 G | Refills: 3 | Status: SHIPPED | OUTPATIENT
Start: 2021-06-04 | End: 2024-07-20

## 2021-06-04 RX ORDER — MORPHINE SULFATE 15 MG/1
30 TABLET, FILM COATED, EXTENDED RELEASE ORAL AT BEDTIME
Qty: 60 TABLET | Refills: 0 | COMMUNITY
Start: 2021-06-04 | End: 2021-08-13

## 2021-06-04 NOTE — TELEPHONE ENCOUNTER
Prior Authorization Retail Medication Request    Medication/Dose: Oxycodone 5 mg Immediate Rel Tabs  ICD code (if different than what is on RX):    Previously Tried and Failed:    Rationale:      Insurance Name:  [employer]  Insurance ID:  [id number]      Pharmacy Information (if different than what is on RX)  Name:    Phone:

## 2021-06-04 NOTE — LETTER
"    6/4/2021         RE: Teresa Sanderson  [address] [place] [address]        Dear Colleague,    Thank you for referring your patient, Teresa Sanderson, to the [facility] CANCER [facility]. Please see a copy of my visit note below.    Teresa is a 45 year old who is being evaluated via a billable video visit.      How would you like to obtain your AVS? Mail a copy  If the video visit is dropped, the invitation should be resent by: Text to cell phone: [phone]   Will anyone else be joining your video visit? No     Monae Nieto CMA      Palliative Care Clinic Consult Note    Patient Name: Teresa Sanderson  Primary Provider: No Ref-Primary, Physician    Chief Complaint: Pain, coping    Summary: 44 yo F with metastatic breast cancer.  - Presented with back pain Dec. 2020, found to have lesions at L4, L5, and left iliac bone as well as paraspinal mass. Additional imaging revealed left breast mass and additional lesions in spine/pelvis. +DVT in L popliteal vein.  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  - s/p XRT to Lumbar spine (finished Jan 2021).  - Jan 2021 started Ibrance, zoladex, and anastrozole.   - 5/17/21- s/p radiofrequency ablation, keloplasty/segmental plasty of L4     Reviewed: Yes, no concerns. Oxycodone 5mg tablets filled by Oncology.    History of Present Illness:  Teresa Sanderson is a 45 year old female who is seen for a new consult via Video visit today with Palliative Care.      Pain:  \"All over\" pain. Primarily located in shoulder, low back, and R leg.  Describes as deep ache.  Cannot get comfortable, as in pain with lying down as well as sitting up.  Seems to have some weakness associated with this.  Was on MSContin 15mg BID but ran out last week. Using oxycodone 10 mg 4-5x daily with tylenol 1,350mg 4-5x daily as well as gabapentin (600mg, 300mg, 600mg).    Appetite/Nausea: Appetite has been okay.  No " "nausea.  Sometimes does have to force herself to eat if she is uncomfortable.     Bowels: 1 episode of constipation. Has been using miralax and apple juice or orange juice PRN, senna if severe.     Sleep: Struggling with sleep, primarily because of pain.  However has had some trouble sleeping some time, has been taking Seroquel 100 mg at bedtime.     Mood: On zoloft 50mg daily and seroquel 100mg at bedtime.  Struggling with being a burden to her family as well as the suddenness of her diagnosis.  She does describe a history of schizoaffective disorder with some episodes of severe psychosis, most recently in 2019.     Coping:  Is not connected with a therapist or counselor.  Due to her limitations from pain, her 22-year-old daughter is having to come over and help her with most household chores.  She is really struggling with her daughter having to take up her own time to help work on especially 1 daughter currently living additional housing.  She knows that the attention of her cancer treatment is palliative in nature and that is not curable.     Social History:  Name is pronounced \"Johan\".  Moved back to [place] about a year ago from [place], [place].  Lives in two-story house with her cousin, though one sister is staying with her for the summer.   Social History     Tobacco Use     Smoking status: Current Every Day Smoker     Packs/day: 0.25     Types: Cigarettes     Start date: 5/13/2011     Smokeless tobacco: Never Used   Substance Use Topics     Alcohol use: Not Currently     Comment: occ     Drug use: Yes     Types: Marijuana     Comment: occ     No Known Allergies    Advanced Care Planning: Did not discuss today due to high symptom burden.    Medications- Reviewed in Epic.    Past Medical History- Reviewed in [facility].    Past Surgical History- Reviewed in Epic.    Review of Systems:   ROS: 10 point ROS neg other than the symptoms noted above in the HPI.    Physical Exam:   Constitutional: Alert, pleasant, no " apparent distress. Sitting up in chair.  Eyes: Sclera non-icteric, no eye discharge.  ENT: No nasal discharge. Ears grossly normal.  Respiratory: Unlabored respirations. Speaking in full sentences.  Musculoskeletal: Extremities appear normal- no gross deformities noted. No edema noted on upper body.   Skin: No suspicious lesions or rashes on visible skin.  Neurologic: Clear speech, no aphasia. No facial droop.  Psychiatric: Mentation appears normal, appropriate attention. Affect normal/bright. Does not appear anxious or depressed.      Key Data Reviewed:  LABS: 5/27/21- Cr 1.12 (increased), GFR 68, Albumin 3.5, LFTs wnl. WBC 5.5, Hgb 11.5, Plts 306. CA 27-29 of 63 (trending up).     IMAGING: MRI Lumbar Spine 3/11/21- Impression: Multiple enhancing bone lesions consistent with metastatic disease. The lesion at sacral S1 is smaller compared with prior MRI from December 2020, likely representing positive response to treatment. However other lesions such as seen in L3 and L2 vertebral bodies are more conspicuous in today's exam, could represent local mild progression or better seen in today's exam due to improved MRI quality. Stable mild spinal canal narrowing and bilateral mild neural foraminal narrowing at L4-5. No leptomeningeal enhancement.    MRI R Hip 3/21/21- IMPRESSION:  1.  No evidence for proximal right femur fracture, femoral neck stress fracture or classic femoral head AVN.  2.  Mild right hip chondrosis with probably a small anterior labral tear.  3.  Mild right greater trochanteric bursitis/distal gluteal peritendinitis.    Opioid Risk Tool (ORT):    Family History of Substance Abuse:        Alcohol = 0 pt (no)       Illegal Drugs = 3 pts (yes, male)       Prescription Drugs = 0 pt (no)      Personal History of Substance Abuse:       Alcohol = 0 pt (no)       Illegal Drugs = 0 pt (no)       Prescription Drugs = 0 pt (no)        Age: 1 pt (age 16-45)      History of Pre-adolescent Sexual Abuse: 0 pt  (no)      Psychological Disease: 2 pts (ADD, OCD, bipolar, schizophrenia) - schizoaffective disorder      ORT Score = 6, due to age, family hx of drug use, and schizoaffective hx       0-3: Low risk for opioid abuse       4-7: Moderate risk for opioid abuse       >/= 8: High risk for opioid abuse      Impression & Recommendations & Counseling:  Teresa Sanderson is a 45 year old female with history of breast cancer metastatic to bone, s/p XRT to spine as well as radiofrequency ablation, keloplasty/segmental plasty of L4.     Cancer-related pain - Severe pain that is significantly affecting her quality of life. Asked Oncology about additional steroids, however, she did not have much of a response in the past.  - PC will take over prescribing opioids.  - Increase MSContin to 30mg Q12H  - Continue Oxycodone 5-10mg Q3H PRN  - Discussed decreasing Tylenol to 1350 three times daily.  - PC RN to check on pain control next week.    Bowels - Refilled bowel meds today. Currently bowels are stable.    Mood - Having some depression and struggling with coping with illness and concern of being burden to family.  - Increase zoloft over the the next two weeks to 100mg daily.  - Referral placed to Health Psych  - Also sent note to Oncology SW re: patient's questions about PCA services.  - Will keep an eye on her mood- she has hx of schizoaffective disorder and was treated for psychosis in the past (last in 2019).    Difficulty sleeping - Primarily pain related, but has been a longstanding issue too it sounds like.  - Trial of doxepin 3-6mg at bedtime.    Follow up: One month    Video-Visit Details  Video Start Time: 12:32Pm  Video End Time: 1:15PM    Originating Location (pt. Location): Home     Distant Location (provider location):  [facility] CANCER [facility]     Platform used for Video Visit: Chad    Total time spent on day of encounter is 99 mins, including reviewing record, review of above studies, above visit with  patient, and documentation.     Ayanna Tellez DO  Palliative Medicine   Pager [phone], [id number]        Again, thank you for allowing me to participate in the care of your patient.        Sincerely,        Ayanna Tellez DO

## 2021-06-04 NOTE — PATIENT INSTRUCTIONS
Recommendations:  - OK to increase morphine to two tablets (total of 30mg) in the morning and at night.  - Continue oxycodone 1-2 tablets every 3 hours as needed for severe pain.  - I would limit your Tylenol to two tablets three times daily.  - Increase your zoloft to 1.5 tablets. If you do Ok with this, we can increase to 2 full tablets after about a week.  - I have sent messages to both Alee to ask about steroids and to the  to ask for guidance about the financial/PCA questions.  - I have also placed a referral to our health psychology team to get you set up for a counseling meeting.  - Continue using the miralax and senna for your bowels.  - Try the doxepin 3-6mg (which is the very small 0.3-0.6mL of liquid) to help with sleep. I'm hopeful that improving your pain will help with sleep.  - I will have the palliative care nurse give you a call next week to check on pain.    Follow up: One month      Reasons to Call    If you are having worsening/uncontrolled symptoms we want you to call!    You or your other physicians make any changes to medications we have prescribed.    Important Phone Numbers    Aisha Shelton. RN palliative care nurse clinician [phone]    Betty Daly. Palliative Care RN. Answered 8 am to 4 pm . [phone]    Appointment Line.[phone]   *After hours or on weekends. Will connect you with on call MD. [phone]

## 2021-06-04 NOTE — LETTER
"    6/4/2021         RE: Teresa Sanderson  [address] [address]        Dear Colleague,    Thank you for referring your patient, Teresa Sanderson, to the [facility] CANCER [facility]. Please see a copy of my visit note below.    Teresa is a 45 year old who is being evaluated via a billable video visit.      How would you like to obtain your AVS? Mail a copy  If the video visit is dropped, the invitation should be resent by: Text to cell phone: [phone]   Will anyone else be joining your video visit? No     Monae Nieto CMA      Palliative Care Clinic Consult Note    Patient Name: Teresa Sanderson  Primary Provider: No Ref-Primary, Physician    Chief Complaint: Pain, coping    Summary: 44 yo F with metastatic breast cancer.  - Presented with back pain Dec. 2020, found to have lesions at L4, L5, and left iliac bone as well as paraspinal mass. Additional imaging revealed left breast mass and additional lesions in spine/pelvis. +DVT in L popliteal vein.  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  - s/p XRT to Lumbar spine (finished Jan 2021).  - Jan 2021 started Ibrance, zoladex, and anastrozole.   - 5/17/21- s/p radiofrequency ablation, keloplasty/segmental plasty of L4     Reviewed: Yes, no concerns. Oxycodone 5mg tablets filled by Oncology.    History of Present Illness:  Teresa Sanderson is a 45 year old female who is seen for a new consult via Video visit today with Palliative Care.      Pain:  \"All over\" pain. Primarily located in shoulder, low back, and R leg.  Describes as deep ache.  Cannot get comfortable, as in pain with lying down as well as sitting up.  Seems to have some weakness associated with this.  Was on MSContin 15mg BID but ran out last week. Using oxycodone 10 mg 4-5x daily with tylenol 1,350mg 4-5x daily as well as gabapentin (600mg, 300mg, 600mg).    Appetite/Nausea: Appetite has been okay.  No " "nausea.  Sometimes does have to force herself to eat if she is uncomfortable.     Bowels: 1 episode of constipation. Has been using miralax and apple juice or orange juice PRN, senna if severe.     Sleep: Struggling with sleep, primarily because of pain.  However has had some trouble sleeping some time, has been taking Seroquel 100 mg at bedtime.     Mood: On zoloft 50mg daily and seroquel 100mg at bedtime.  Struggling with being a burden to her family as well as the suddenness of her diagnosis.  She does describe a history of schizoaffective disorder with some episodes of severe psychosis, most recently in 2019.     Coping:  Is not connected with a therapist or counselor.  Due to her limitations from pain, her 22-year-old daughter is having to come over and help her with most household chores.  She is really struggling with her daughter having to take up her own time to help work on especially 1 daughter currently living additional housing.  She knows that the attention of her cancer treatment is palliative in nature and that is not curable.     Social History:  Name is pronounced \"Johan\".  Moved back to [place] about a year ago from [place], [place].  Lives in two-story house with her cousin, though one sister is staying with her for the summer.   Social History     Tobacco Use     Smoking status: Current Every Day Smoker     Packs/day: 0.25     Types: Cigarettes     Start date: 5/13/2011     Smokeless tobacco: Never Used   Substance Use Topics     Alcohol use: Not Currently     Comment: occ     Drug use: Yes     Types: Marijuana     Comment: occ     No Known Allergies    Advanced Care Planning: Did not discuss today due to high symptom burden.    Medications- Reviewed in Epic.    Past Medical History- Reviewed in [facility].    Past Surgical History- Reviewed in Epic.    Review of Systems:   ROS: 10 point ROS neg other than the symptoms noted above in the HPI.    Physical Exam:   Constitutional: Alert, pleasant, no " apparent distress. Sitting up in chair.  Eyes: Sclera non-icteric, no eye discharge.  ENT: No nasal discharge. Ears grossly normal.  Respiratory: Unlabored respirations. Speaking in full sentences.  Musculoskeletal: Extremities appear normal- no gross deformities noted. No edema noted on upper body.   Skin: No suspicious lesions or rashes on visible skin.  Neurologic: Clear speech, no aphasia. No facial droop.  Psychiatric: Mentation appears normal, appropriate attention. Affect normal/bright. Does not appear anxious or depressed.      Key Data Reviewed:  LABS: 5/27/21- Cr 1.12 (increased), GFR 68, Albumin 3.5, LFTs wnl. WBC 5.5, Hgb 11.5, Plts 306. CA 27-29 of 63 (trending up).     IMAGING: MRI Lumbar Spine 3/11/21- Impression: Multiple enhancing bone lesions consistent with metastatic disease. The lesion at sacral S1 is smaller compared with prior MRI from December 2020, likely representing positive response to treatment. However other lesions such as seen in L3 and L2 vertebral bodies are more conspicuous in today's exam, could represent local mild progression or better seen in today's exam due to improved MRI quality. Stable mild spinal canal narrowing and bilateral mild neural foraminal narrowing at L4-5. No leptomeningeal enhancement.    MRI R Hip 3/21/21- IMPRESSION:  1.  No evidence for proximal right femur fracture, femoral neck stress fracture or classic femoral head AVN.  2.  Mild right hip chondrosis with probably a small anterior labral tear.  3.  Mild right greater trochanteric bursitis/distal gluteal peritendinitis.    Opioid Risk Tool (ORT):    Family History of Substance Abuse:        Alcohol = 0 pt (no)       Illegal Drugs = 3 pts (yes, male)       Prescription Drugs = 0 pt (no)      Personal History of Substance Abuse:       Alcohol = 0 pt (no)       Illegal Drugs = 0 pt (no)       Prescription Drugs = 0 pt (no)        Age: 1 pt (age 16-45)      History of Pre-adolescent Sexual Abuse: 0 pt  (no)      Psychological Disease: 2 pts (ADD, OCD, bipolar, schizophrenia) - schizoaffective disorder      ORT Score = 6, due to age, family hx of drug use, and schizoaffective hx       0-3: Low risk for opioid abuse       4-7: Moderate risk for opioid abuse       >/= 8: High risk for opioid abuse      Impression & Recommendations & Counseling:  Teresa Sanderson is a 45 year old female with history of breast cancer metastatic to bone, s/p XRT to spine as well as radiofrequency ablation, keloplasty/segmental plasty of L4.     Cancer-related pain - Severe pain that is significantly affecting her quality of life. Asked Oncology about additional steroids, however, she did not have much of a response in the past.  - PC will take over prescribing opioids.  - Increase MSContin to 30mg Q12H  - Continue Oxycodone 5-10mg Q3H PRN  - Discussed decreasing Tylenol to 1350 three times daily.  - PC RN to check on pain control next week.    Bowels - Refilled bowel meds today. Currently bowels are stable.    Mood - Having some depression and struggling with coping with illness and concern of being burden to family.  - Increase zoloft over the the next two weeks to 100mg daily.  - Referral placed to Health Psych  - Also sent note to Oncology SW re: patient's questions about PCA services.  - Will keep an eye on her mood- she has hx of schizoaffective disorder and was treated for psychosis in the past (last in 2019).    Difficulty sleeping - Primarily pain related, but has been a longstanding issue too it sounds like.  - Trial of doxepin 3-6mg at bedtime.    Follow up: One month    Video-Visit Details  Video Start Time: 12:32Pm  Video End Time: 1:15PM    Originating Location (pt. Location): Home     Distant Location (provider location):  [facility] CANC[facility]     Platform used for Video Visit: Chad    Total time spent on day of encounter is 99 mins, including reviewing record, review of above studies, above visit with  patient, and documentation.     Ayanna Tellez DO  Palliative Medicine   Pager [phone], [id number]        Again, thank you for allowing me to participate in the care of your patient.        Sincerely,        Ayanna Tellez DO

## 2021-06-04 NOTE — TELEPHONE ENCOUNTER
RECORDS STATUS - BREAST    RECORDS REQUESTED FROM: Palliative Care Consult - ref. Alee Yang   DATE REQUESTED: 6.4.21   NOTES DETAILS STATUS   OFFICE NOTE from referring provider Internal 6.1.21 Alee Yang, [facility] Onc  3.25.21   OFFICE NOTE from medical oncologist Internal 4.20.21 Dr Jahaira Plunkett, [facility] Onc   OFFICE NOTE from surgeon Internal 4.26.21 Dr Santo Wiggins, [facility] Vas Surg   OFFICE NOTE from radiation oncologist Internal 1.4.21 Dr Cheryl Kramer, [facility] Rad Onc  12.23.20   DISCHARGE SUMMARY from hospital Internal    DISCHARGE REPORT from the ER N/A    OPERATIVE REPORT Internal    MEDICATION LIST Internal    CLINICAL TRIAL TREATMENTS TO DATE N/A    LABS     PATHOLOGY REPORTS  (Tissue diagnosis, Stage, ER/GA percentage positive and intensity of staining, HER2 IHC, FISH, and all biopsies from breast and any distant metastasis)                 Internal 2.3.21 SPECIMEN(S):   Endometrial biopsy  12.17.20 SPECIMEN(S):   Breast needle biopsy, left 12 o'clock   GENONOMIC TESTING     TYPE:   (Next Generation Sequencing, including Foundation One testing, and Oncotype score) N/A    IMAGING (NEED IMAGES & REPORT)     CT SCANS Internal    MRI Internal    MAMMO Internal    ULTRASOUND Internal    PET N/A    BONE SCAN N/A    BRAIN MRI Internal

## 2021-06-05 ENCOUNTER — DOCUMENTATION ONLY (OUTPATIENT)
Dept: ONCOLOGY | Facility: CLINIC | Age: 46
End: 2021-06-05

## 2021-06-07 NOTE — TELEPHONE ENCOUNTER
Prior Authorization Approval - per [employer]/ [email or web address] PMAP as qty 56 tabs per 4 days    Authorization Effective Date: 6/7/2021  Authorization Expiration Date: 12/6/2021  Medication: oxyCODONE (ROXICODONE) 5 MG tablet  Approved Dose/Quantity: 56  Reference #:     Insurance Company: [employer] - Phone [phone] Fax [phone]  Expected CoPay:       Which Pharmacy is filling the prescription (Not needed for infusion/clinic administered): [id number] - [facility] [address] AT [facility] [address] [facility] [address]  Pharmacy Notified: Yes  Patient Notified: No

## 2021-06-07 NOTE — TELEPHONE ENCOUNTER
Central Prior Authorization Team   Phone: [phone]      PA Initiation    Medication: oxyCODONE (ROXICODONE) 5 MG tablet  Insurance Company: [employer] - Phone [phone] Fax [phone]  Pharmacy Filling the Rx: [id number] - [place], [place] - [address] AT [facility] [address]  Filling Pharmacy Phone: [phone]  Filling Pharmacy Fax:    Start Date: 6/7/2021

## 2021-06-09 ENCOUNTER — HOSPITAL ENCOUNTER (OUTPATIENT)
Dept: PET IMAGING | Facility: CLINIC | Age: 46
Discharge: HOME | End: 2021-06-09
Attending: INTERNAL MEDICINE | Admitting: INTERNAL MEDICINE
Payer: COMMERCIAL

## 2021-06-09 DIAGNOSIS — C50.912 CARCINOMA OF LEFT BREAST METASTATIC TO BONE (H): ICD-10-CM

## 2021-06-09 DIAGNOSIS — C79.51 CARCINOMA OF LEFT BREAST METASTATIC TO BONE (H): ICD-10-CM

## 2021-06-09 PROCEDURE — 78816 PET IMAGE W/CT FULL BODY: CPT | Mod: PI

## 2021-06-09 PROCEDURE — 74177 CT ABD & PELVIS W/CONTRAST: CPT | Mod: 26 | Performed by: RADIOLOGY

## 2021-06-09 PROCEDURE — 78816 PET IMAGE W/CT FULL BODY: CPT | Mod: 26 | Performed by: RADIOLOGY

## 2021-06-09 PROCEDURE — 250N000011 HC RX IP 250 OP 636: Performed by: INTERNAL MEDICINE

## 2021-06-09 PROCEDURE — 71260 CT THORAX DX C+: CPT

## 2021-06-09 PROCEDURE — 343N000001 HC RX 343: Performed by: INTERNAL MEDICINE

## 2021-06-09 PROCEDURE — A9552 F18 FDG: HCPCS | Performed by: INTERNAL MEDICINE

## 2021-06-09 PROCEDURE — 71260 CT THORAX DX C+: CPT | Mod: 26 | Performed by: RADIOLOGY

## 2021-06-09 RX ORDER — IOPAMIDOL 755 MG/ML
70-135 INJECTION, SOLUTION INTRAVASCULAR ONCE
Status: COMPLETED | OUTPATIENT
Start: 2021-06-09 | End: 2021-06-09

## 2021-06-09 RX ADMIN — IOPAMIDOL 102 ML: 755 INJECTION, SOLUTION INTRAVENOUS at 15:03

## 2021-06-09 RX ADMIN — FLUDEOXYGLUCOSE F-18 15.22 MCI.: 500 INJECTION, SOLUTION INTRAVENOUS at 14:14

## 2021-06-10 ENCOUNTER — VIRTUAL VISIT (OUTPATIENT)
Dept: ONCOLOGY | Facility: CLINIC | Age: 46
End: 2021-06-10
Attending: PHYSICIAN ASSISTANT
Payer: COMMERCIAL

## 2021-06-10 DIAGNOSIS — C50.912 CARCINOMA OF LEFT BREAST METASTATIC TO BONE (H): Primary | ICD-10-CM

## 2021-06-10 DIAGNOSIS — Z17.0 MALIGNANT NEOPLASM OF OVERLAPPING SITES OF LEFT BREAST IN FEMALE, ESTROGEN RECEPTOR POSITIVE (H): ICD-10-CM

## 2021-06-10 DIAGNOSIS — C50.812 MALIGNANT NEOPLASM OF OVERLAPPING SITES OF LEFT BREAST IN FEMALE, ESTROGEN RECEPTOR POSITIVE (H): ICD-10-CM

## 2021-06-10 DIAGNOSIS — C50.919 METASTATIC BREAST CANCER: Primary | ICD-10-CM

## 2021-06-10 DIAGNOSIS — C79.51 BONE METASTASIS: ICD-10-CM

## 2021-06-10 DIAGNOSIS — C79.51 CARCINOMA OF LEFT BREAST METASTATIC TO BONE (H): ICD-10-CM

## 2021-06-10 DIAGNOSIS — C79.51 CARCINOMA OF LEFT BREAST METASTATIC TO BONE (H): Primary | ICD-10-CM

## 2021-06-10 DIAGNOSIS — R53.81 PHYSICAL DECONDITIONING: ICD-10-CM

## 2021-06-10 DIAGNOSIS — C50.912 CARCINOMA OF LEFT BREAST METASTATIC TO BONE (H): ICD-10-CM

## 2021-06-10 PROCEDURE — 99215 OFFICE O/P EST HI 40 MIN: CPT | Mod: 95 | Performed by: PHYSICIAN ASSISTANT

## 2021-06-10 PROCEDURE — 999N001193 HC VIDEO/TELEPHONE VISIT; NO CHARGE

## 2021-06-10 RX ORDER — HEPARIN SODIUM,PORCINE 10 UNIT/ML
5 VIAL (ML) INTRAVENOUS
Status: CANCELLED | OUTPATIENT
Start: 2021-07-19

## 2021-06-10 RX ORDER — HEPARIN SODIUM (PORCINE) LOCK FLUSH IV SOLN 100 UNIT/ML 100 UNIT/ML
5 SOLUTION INTRAVENOUS
Status: CANCELLED | OUTPATIENT
Start: 2021-07-19

## 2021-06-10 RX ORDER — ZOLEDRONIC ACID 0.04 MG/ML
4 INJECTION, SOLUTION INTRAVENOUS ONCE
Status: CANCELLED | OUTPATIENT
Start: 2021-07-19

## 2021-06-10 RX ORDER — ANASTROZOLE 1 MG/1
1 TABLET ORAL DAILY
Qty: 28 TABLET | Refills: 0 | Status: SHIPPED | OUTPATIENT
Start: 2021-06-10 | End: 2021-07-08

## 2021-06-10 NOTE — LETTER
6/10/2021         RE: Teresa Sanderson  [address] [facility] [address]        Dear Colleague,    Thank you for referring your patient, Teresa Sanderson, to the [facility]CER [facility]. Please see a copy of my visit note below.    Teresa is a 45 year old who is being evaluated via a billable video visit.      How would you like to obtain your AVS? Mail a copy  If the video visit is dropped, the invitation should be resent by: Text to cell phone: [phone]  Will anyone else be joining your video visit? No     Patient needs refills for Seroquel.       Vitals - Patient Reported  Weight (Patient Reported): 117.9 kg (260 lb)  Height (Patient Reported): 182.9 cm (6')  BMI (Based on Pt Reported Ht/Wt): 35.26  Pain Score: Worst Pain (10)  Pain Loc: Low Back      Smithayuliya MCELROY      Video Start Time: 11:34 AM  Video-Visit Details    Type of service:  Video Visit    Video End Time:11:56 AM    Originating Location (pt. Location): Home    Distant Location (provider location):  [facility]     Platform used for Video Visit: [employer]      Oncology Visit:   Date on this visit: Zia 10, 2021    Diagnosis:  ER positive left breast cancer metastasized to bones.     Primary Physician: No Ref-Primary, Physician     History Of Present Illness:    Ms. Sanderson is a 45 year old female with a h/o tobacco abuse and DVTs with left breast cancer metastasized to bone. She presented to [facility] ED with back pain on 12/5/2020. MRI of the L-spine showed an abnormal L4 lesion with associated right paraspinal mass, abnormalities in L5 and the left iliac bone were also seen. CT C/A/P showed a left breast mass, lytic lesions of T7, L4, and the pelvis, and a 3 cm lesion in the kidney (thought to be a cyst). Ultrasound of the bilateral lower extremities showed a non-occlusive thrombus in the left popliteal vein. Mammogram and ultrasound of the bilateral breasts on 12/17/2020 showed a  spiculated mass measuring at least 7.8 cm at 12-1:00 left breast extending from the nipple to 9 cm from the nipple with associated nipple retraction. This mass was biopsied, and showed IDC with surrounding DCIS, grade 3, ER+ 90%, and AL+ 75%.  HER2 was equivocal in approximately 35% of tumor cells by FISH and was negative by IHC.    Metastatic Breast Cancer Treatment:  12/23/2021 - 1/7/2021  Radiation (3000 cGy) to the lumbar spine.  1/29/2021 - present  Ibrance, zoladex, and anastrozole.  5/17/21- Radiofrequency ablation, keloplasty/segmental plasty of L4     Interval History: Teresa is still having pain. It is mostly in her back. It is better controlled on Ms Contin 30 mg BID. She still has some diffuse pain but this is better. Has continued hot flashes. She has been getting out and walking more around the stores. She has dyspnea if she walks for more than 10 minutes. No wheezing, fevers/chills,cough. No leg swelling. She has been consistently taking Xarelto. She has been smoking more with feeling stress about the cancer.     Past Medical/Surgical History:   Past Medical History:   Diagnosis Date     Anxiety      Breast CA (H) 12/2020     Depression      DVT (deep venous thrombosis) (H) 2014     Left breast mass     x approximately 4-5 months     Pulmonary emboli (H)      Pyelonephritis      Schizoaffective disorder (H)      Tobacco use      Past Surgical History:   Procedure Laterality Date     IR LUMBAR KYPHOPLASTY VERTEBRAE  5/17/2021     TUBAL LIGATION  1998      No Known Allergies    Current Outpatient Medications   Medication     anastrozole (ARIMIDEX) 1 MG tablet     doxepin (SINEQUAN) 10 MG/ML (HIGH CONC) solution     gabapentin (NEURONTIN) 300 MG capsule     methocarbamol (ROBAXIN) 500 MG tablet     morphine (MS CONTIN) 15 MG CR tablet     naloxone (NARCAN) 4 MG/0.1ML nasal spray     ondansetron (ZOFRAN-ODT) 4 MG ODT tab     oxyCODONE (ROXICODONE) 5 MG tablet     palbociclib (IBRANCE) 125 MG tablet      pantoprazole (PROTONIX) 40 MG EC tablet     polyethylene glycol (MIRALAX) 17 GM/Dose powder     prochlorperazine (COMPAZINE) 10 MG tablet     QUEtiapine (SEROQUEL) 100 MG tablet     rivaroxaban ANTICOAGULANT (XARELTO) 20 MG TABS tablet     SENNA-docusate sodium (SENNA S) 8.6-50 MG tablet     sertraline (ZOLOFT) 50 MG tablet     No current facility-administered medications for this visit.    '    Physical Exam:   Video physical exam  General: Patient appears well in no acute distress.   Skin: No visualized rash or lesions on visualized skin  Eyes: EOMI, no erythema, sclera icterus or discharge noted  Resp: Appears to be breathing comfortably without accessory muscle usage, speaking in full sentences, no cough  MSK: Appears to have normal range of motion based on visualized movements  Neurologic: No apparent tremors, facial movements symmetric  Psych: affect and mood congruent, alert and oriented    The rest of a comprehensive physical examination is deferred due to PHE (public health emergency) video restrictions        Laboratory/Imaging Studies   PET/CT 6/9                                                                 IMPRESSION: In this patient with history of stage IV invasive breast  cancer, metastatic to bone and left paraspinal musculature:  1.  No hypermetabolic disease in the chest, abdomen, or pelvis.   2.  Mild FDG uptake in the region of the left breast is below  background, consistent with complete response.  3.  Scattered mixed lytic and sclerotic osseous foci, likely  representing healed metastatic disease.  4.  Left greater than right perihilar groundglass opacities, could  represent small airway versus small vessel inflammation, less likely  infection.  5.  Mild focal radiotracer uptake at the GE junction, likely  represents sequela of gastroesophageal reflux.  6.  Kyphoplasty changes to L4 with associated vascular intravasation  of radiodense material extending into the IVC.      ASSESSMENT/PLAN:    45 year old female with history of DVT with left breast invasive ductal carcinoma, ER/AZ positive, HER2 negative metastasized to bones.    1.  Metastatic breast cancer:  On treatment with Ibrance, zoladex, and anastrozole for almost 6 months. She had initial improvement in markers and then had slow rise through April with some decrease last month. Her CEA has now normalized. She had a PET done yesterday which shows a CR. Reviewed the excellent news with her. She is very pleased with this and is motivated to stop smoking, do PT, and start exercising to help with her general condition. Has zoladex and labs again next week and then follow-up with Dr. Plunkett next month.     2.  Bone metastases:  She is s/p XRT to the lumbar spine.  She reports ongoing pain and continues to take MS contin 30 mg PO BID (dose recently increased) and oxycodone prn.  On Zometa since 1/18/2021 and is receiving once every 3 months. Last given 4/20/21.     3.  Pain: Reviewed I expect with a CR, her bone met pain should continue to improve. I think some pain in part is coming from the aromatase inhibitor. We need to be very cautious about opioid use for this. I would hope over the next few months opioids can start to be tapered. To help with AI pain, I want her to start PT and start walking every day and stretching.     4.  DVT:  Recommend continuing Xarelto until contraindicated given metastatic disease.      5.  Hot flashes/anxiety:  Continue Zoloft 50 mg PO daily and Seroquel 100 mg PO at bedtime. She is also on gabapentin TID.     6. Hx kyphoplasty to L4 with vascular intravasation of cement to IVC: Dr. Wiggins called me about this yesterday. He reviewed this cement should not move. The main risk is clot formation and would recommend therapeutic anticoagulation, which she is already on. There may be an option of stenting over the area in the future. He has follow-up with her coming up on 6/21. I did review this finding with Teresa  today.     40 minutes spent on the date of the encounter doing chart review, review of test results, interpretation of tests, patient visit, documentation and discussion with other provider(s)     Alee Yang PA-C         Again, thank you for allowing me to participate in the care of your patient.        Sincerely,        Alee aYng PA-C

## 2021-06-11 ENCOUNTER — PATIENT OUTREACH (OUTPATIENT)
Dept: CARE COORDINATION | Facility: CLINIC | Age: 46
End: 2021-06-11

## 2021-06-11 ENCOUNTER — TELEPHONE (OUTPATIENT)
Dept: PALLIATIVE CARE | Facility: CLINIC | Age: 46
End: 2021-06-11

## 2021-06-11 ENCOUNTER — VIRTUAL VISIT (OUTPATIENT)
Dept: ONCOLOGY | Facility: CLINIC | Age: 46
End: 2021-06-11
Payer: COMMERCIAL

## 2021-06-11 DIAGNOSIS — Z53.9 ERRONEOUS ENCOUNTER--DISREGARD: Primary | ICD-10-CM

## 2021-06-11 DIAGNOSIS — R45.89 DIFFICULTY COPING: ICD-10-CM

## 2021-06-11 DIAGNOSIS — F41.8 MIXED ANXIETY AND DEPRESSIVE DISORDER: ICD-10-CM

## 2021-06-11 RX ORDER — DOXEPIN 6 MG/1
6 TABLET, FILM COATED ORAL
Qty: 30 TABLET | Refills: 0 | COMMUNITY
Start: 2021-06-11 | End: 2021-08-13

## 2021-06-11 NOTE — TELEPHONE ENCOUNTER
Med changed to capsule form. New med called into pharmacy.    Aisha Shelton, MAXIN, RN  Palliative Care Nurse Clinician  [phone]

## 2021-06-11 NOTE — TELEPHONE ENCOUNTER
PRIOR AUTHORIZATION DENIED    Medication: doxepin (SINEQUAN) 10 MG/ML (HIGH CONC) solution-PA denied    Denial Date: 6/11/2021    Denial Rational: Must use capsules (No PA required)

## 2021-06-11 NOTE — TELEPHONE ENCOUNTER
Phone call placed to pt to discuss effectiveness of increased pain medications. She reports the change has been helpful. She notices most discomfort in the mornings and at night but is working hard to get up and moving, taking walks. She learned yesterday that her cancer has had a complete response to chemotherapy and she is grateful.     She had an appt with Dr. Drake this morning, but unfortunately missed it because it was too early in the morning. Will send a message to scheduling team to reach out to pt again to reschedule.    She asked about doxepin, stated it wasn't at [facility] when she picked up her MS contin. Phone call placed to pharmacy. Med needs prior auth. Prior auth team notified.    MAXI CarvajalN, RN  Palliative Care Nurse Clinician  [phone]

## 2021-06-11 NOTE — LETTER
6/11/2021         RE: Teresa Sanderson  [address] [facility] [address]        Dear Colleague,    Thank you for referring your patient, Teresa Sanderson, to the [facility]. Please see a copy of my visit note below.      This encounter was opened in error. Please disregard.      Again, thank you for allowing me to participate in the care of your patient.        Sincerely,        Maury Drake PsyD

## 2021-06-11 NOTE — TELEPHONE ENCOUNTER
Central Prior Authorization Team   Phone: [phone]      PA Initiation    Medication: doxepin (SINEQUAN) 10 MG/ML (HIGH CONC) solution-PA initiated  Insurance Company: [employer] - Phone [phone] Fax [phone]  Pharmacy Filling the Rx: [facility] #[id number] - [place], [place] - [address] AT [facility] OF [address]  Filling Pharmacy Phone: [phone]  Filling Pharmacy Fax:    Start Date: 6/11/2021

## 2021-06-12 ENCOUNTER — DOCUMENTATION ONLY (OUTPATIENT)
Dept: ONCOLOGY | Facility: CLINIC | Age: 46
End: 2021-06-12

## 2021-06-14 ENCOUNTER — TELEPHONE (OUTPATIENT)
Dept: ONCOLOGY | Facility: CLINIC | Age: 46
End: 2021-06-14

## 2021-06-14 NOTE — TELEPHONE ENCOUNTER
"Social Work Progress Note      Data/Intervention:  Patient Name:  Teresa Sanderson  /Age:  1975 (45 year old)    Reason for Follow-Up:  Cleaster \"Clee- Esperanza\" Kin is a 45-year-old woman with a diagnosis of metastatic breast cancer who is followed oncologically by Alee Yang and Jahaira Plunkett at the [facility]. Teresa also follows with Ayanna Stoner for palliative care. This clinician received referral from Dr. Stoner for PCA support.     Intervention:   Teresa is a recently  woman who relocated to [place] from [place]. Teresa reports that her daughter provides great deal of direct care needs (cleaning, bathing, meals), and she is interested in having daughter be her PCA. SW described process of contacting [facility] and requesting MNChoices assessment. Teresa reported that she would be able to complete this on her own. Teresa receptive to additional resources for financial and legal support. Teresa looking forward to upcoming appointment with therapist, as she expresses that she is grappling with her prognosis and ensuring that all family members, \"are going to be okay.\" Teresa reported that she would reach out to social work with additional care needs.     Resources Provided: (mailed, per pt request)  Onc SW contact information ([facility] [place])  [facility]  [employer]  Financial Support: [employer], [employer], [employer]  Cancer Legal Care  [employer]    Plan:  Provided patient/family with writer's contact information and availability. Will continue to be available as needed for ongoing psychosocial support.       Please call or page if needs or concerns arise.     DANA Pearson, [facility] Community HospitalSW  Direct Phone: [phone]  Pager: [phone]  "

## 2021-06-15 ENCOUNTER — TELEPHONE (OUTPATIENT)
Dept: PALLIATIVE CARE | Facility: CLINIC | Age: 46
End: 2021-06-15

## 2021-06-15 NOTE — PROGRESS NOTES
Social Work Follow-Up Encounter Visit  Oncology Clinic    Data/Intervention:  Patient Name:  Teresa Sanderson  /Age:  1975 (45 year old)    Reason for Follow-Up: Home Health care/PCA resources    Collaborated With:    -Patient  -    Resources Provided:  [facility] Human services number    Assessment:  SW following up on referral from patient's care team regarding information about PCA services. SW called, no answer. Sw left a message explaining reason for call and provided number to [facility] for patient to explore eligibility of PCA services through the [facility]. Sw provided their contact information for on going supports.     Plan:  Previously provided patient/family with writer's contact information and availability.   SW will await return call and remain available as needed.     Jo CORTEZ, LGSVETA  - Oncology  Phone : [phone]  Pager: [phone]

## 2021-06-15 NOTE — TELEPHONE ENCOUNTER
Central Prior Authorization Team   Phone: [phone]      PA Initiation-Insurance previously said doxepin capsules would be covered, but that is a higher dose than patient requires. Tablets are not covered and require PA, per Will at [employer]    Medication: doxepin (SILENOR) 6 MG tablet-PA initiated  Insurance Company: [employer] - Phone [phone] Fax [phone]  Pharmacy Filling the Rx: [facility] #[id number] [place] [address] AT [facility] OF [address]  Filling Pharmacy Phone: [phone]  Filling Pharmacy Fax:    Start Date: 6/15/2021

## 2021-06-16 NOTE — TELEPHONE ENCOUNTER
PRIOR AUTHORIZATION DENIED    Medication: doxepin (SILENOR) 6 MG tablet-PA denied    Denial Date: 6/16/2021    Denial Rational: Per voicemail from Albert at [facility], there are numberous alternatives including doxepin 10 mg caps, zolpidem, trazodone, diphenhydramine, etc.

## 2021-06-21 ENCOUNTER — VIRTUAL VISIT (OUTPATIENT)
Dept: VASCULAR SURGERY | Facility: CLINIC | Age: 46
End: 2021-06-21
Payer: COMMERCIAL

## 2021-06-21 DIAGNOSIS — C50.912 CARCINOMA OF LEFT BREAST METASTATIC TO BONE (H): Primary | ICD-10-CM

## 2021-06-21 DIAGNOSIS — C79.51 CARCINOMA OF LEFT BREAST METASTATIC TO BONE (H): Primary | ICD-10-CM

## 2021-06-21 PROCEDURE — 99215 OFFICE O/P EST HI 40 MIN: CPT | Mod: 95 | Performed by: RADIOLOGY

## 2021-06-21 ASSESSMENT — PAIN SCALES - GENERAL: PAINLEVEL: EXTREME PAIN (9)

## 2021-06-21 NOTE — NURSING NOTE
Vascular Rooming Note     Teresa Sanderson's goals for this visit include:   Chief Complaint   Patient presents with     LUISA Ferguson, is participating in a virtual visit today for a one month kyphoplasty, feeling ok, alot of pain 8-9/10 sometimes more, wondering when pain should subside, as reported by patient.     Nesha Jimenez LPN

## 2021-06-21 NOTE — LETTER
6/21/2021       RE: Teresa Sanderson  [address] [facility] [address]     Dear Colleague,    Thank you for referring your patient, Teresa Sanderson, to the [facility] [place] at [facility]. Please see a copy of my visit note below.    Teresa is a 45 year old who is being evaluated via a billable video visit.      How would you like to obtain your AVS? Mail a copy  If the video visit is dropped, the invitation should be resent by: Text to cell phone: [phone] [employer]  Will anyone else be joining your video visit? No          Video Start Time: 0940    Assessment & Plan   Problem List Items Addressed This Visit     Carcinoma of left breast metastatic to bone (H) - Primary    Relevant Orders    US Aorta/Ivc/Iliac Duplex Complete    PHYSIATRY REFERRAL       46 yo female with metastatic breast CA involving L4 vertebral body, status post ablation and kyphoplasty.  Overall the procedure has been only mildly successful with decreased frequency of the pain and improved response to pain medications and resolution of previous numbness.  Regarding cement intravasation, there is a known complication of cement plasty and anticoagulation for 3 months will be my recommendation.  She would probably be kept on lifelong anticoagulation for other indications.  I would not recommend any attempts for retrieval given the stability of the material.  I would also discussed this with my other colleagues in interventional radiology.RTC in 3 months.    -Referral to physiatry for further management of the back pain.  -3-month anticoagulation for cement intravasation into the IVC (longer duration due to other indications)  -DVT study of the pelvis to establish a new baseline.  -Return to clinic in 3 months.    Santo Wiggins MD  [facility] [place]    I spent a total of 20 minutes face-to-face with Teresa Sanderson during today's office  visit. Over 50% of this time was spent counseling the patient and/or coordinating care. See note for details.     30 minutes spent on the date of the encounter doing chart review, discussion of the case with other providers, history and exam, documentation and further activities as noted above      Trugn Moore is a 45 year old who presents for the following health issues     HPI     Ms. Sanderson is a very pleasant 45-year-old female with metastatic breast cancer, involving the bones, with painful L4 pathological compression deformity, status post radiofrequency ablation and kyphoplasty of L4 on 5/17/2021.  Regarding her symptoms she shares with me that numbness has decreased significantly. The pressure like pain has also decreased but remains disruptive of her lifestyle. The pain has become interrupted and the pain meds are working. Pt uses a walker, which is helpful. Only uses if she is standing for a long period of time. Can go to the bathroom without it, except for the first time she wakes up in the morning.    Review of Systems   An 11 point review of system was performed and pertinent negative and positives are mentioned in HPI.        Objective    Vitals - Patient Reported  Pain Score: Extreme Pain (9)  Pain Loc: Mid Back        Physical Exam   GENERAL: In bed, alert and no distress  EYES: Eyes grossly normal to inspection.  No discharge or erythema, or obvious scleral/conjunctival abnormalities.  RESP: No audible wheeze, cough, or visible cyanosis.  No visible retractions or increased work of breathing.    SKIN: Visible skin clear. No significant rash, abnormal pigmentation or lesions.  NEURO: Cranial nerves grossly intact.  Mentation and speech appropriate for age.  PSYCH: Mentation appears normal, affect normal/bright, judgement and insight intact, normal speech and appearance well-groomed.    I have reviewed the labs and imaging.  PET/CT on 6/9/2021 reveals post augmentation changes of L4 with  cement interposition into IVC.  No DVT.      Video-Visit Details    Type of service:  Video Visit    Video End Time:1000    Originating Location (pt. Location): Home    Distant Location (provider location):  [facility] [place]     Platform used for Video Visit: [employer]        Again, thank you for allowing me to participate in the care of your patient.      Sincerely,    Santo Wiggins MD

## 2021-06-21 NOTE — PROGRESS NOTES
Teresa is a 45 year old who is being evaluated via a billable video visit.      How would you like to obtain your AVS? Mail a copy  If the video visit is dropped, the invitation should be resent by: Text to cell phone: [phone] [employer]  Will anyone else be joining your video visit? No          Video Start Time: 0940    Assessment & Plan   Problem List Items Addressed This Visit     Carcinoma of left breast metastatic to bone (H) - Primary    Relevant Orders    US Aorta/Ivc/Iliac Duplex Complete    PHYSIATRY REFERRAL       46 yo female with metastatic breast CA involving L4 vertebral body, status post ablation and kyphoplasty.  Overall the procedure has been only mildly successful with decreased frequency of the pain and improved response to pain medications and resolution of previous numbness.  Regarding cement intravasation, there is a known complication of cement plasty and anticoagulation for 3 months will be my recommendation.  She would probably be kept on lifelong anticoagulation for other indications.  I would not recommend any attempts for retrieval given the stability of the material.  I would also discussed this with my other colleagues in interventional radiology.RTC in 3 months.    -Referral to physiatry for further management of the back pain.  -3-month anticoagulation for cement intravasation into the IVC (longer duration due to other indications)  -DVT study of the pelvis to establish a new baseline.  -Return to clinic in 3 months.    Santo Wiggins MD  [facility] [place]    I spent a total of 20 minutes face-to-face with Teresa aSnderson during today's office visit. Over 50% of this time was spent counseling the patient and/or coordinating care. See note for details.     30 minutes spent on the date of the encounter doing chart review, discussion of the case with other providers, history and exam, documentation and further activities as noted above      Subjective    Teresa is a 45 year old who presents for the following health issues     HPI     Ms. Sanderson is a very pleasant 45-year-old female with metastatic breast cancer, involving the bones, with painful L4 pathological compression deformity, status post radiofrequency ablation and kyphoplasty of L4 on 5/17/2021.  Regarding her symptoms she shares with me that numbness has decreased significantly. The pressure like pain has also decreased but remains disruptive of her lifestyle. The pain has become interrupted and the pain meds are working. Pt uses a walker, which is helpful. Only uses if she is standing for a long period of time. Can go to the bathroom without it, except for the first time she wakes up in the morning.    Review of Systems   An 11 point review of system was performed and pertinent negative and positives are mentioned in HPI.        Objective    Vitals - Patient Reported  Pain Score: Extreme Pain (9)  Pain Loc: Mid Back        Physical Exam   GENERAL: In bed, alert and no distress  EYES: Eyes grossly normal to inspection.  No discharge or erythema, or obvious scleral/conjunctival abnormalities.  RESP: No audible wheeze, cough, or visible cyanosis.  No visible retractions or increased work of breathing.    SKIN: Visible skin clear. No significant rash, abnormal pigmentation or lesions.  NEURO: Cranial nerves grossly intact.  Mentation and speech appropriate for age.  PSYCH: Mentation appears normal, affect normal/bright, judgement and insight intact, normal speech and appearance well-groomed.    I have reviewed the labs and imaging.  PET/CT on 6/9/2021 reveals post augmentation changes of L4 with cement interposition into IVC.  No DVT.      Video-Visit Details    Type of service:  Video Visit    Video End Time:1000    Originating Location (pt. Location): Home    Distant Location (provider location):  [facility] [place]     Platform used for Video Visit: [employer]

## 2021-06-23 ENCOUNTER — APPOINTMENT (OUTPATIENT)
Dept: LAB | Facility: CLINIC | Age: 46
End: 2021-06-23
Attending: INTERNAL MEDICINE
Payer: COMMERCIAL

## 2021-06-23 ENCOUNTER — INFUSION THERAPY VISIT (OUTPATIENT)
Dept: ONCOLOGY | Facility: CLINIC | Age: 46
End: 2021-06-23
Attending: INTERNAL MEDICINE
Payer: COMMERCIAL

## 2021-06-23 VITALS
HEART RATE: 63 BPM | WEIGHT: 254.1 LBS | DIASTOLIC BLOOD PRESSURE: 86 MMHG | SYSTOLIC BLOOD PRESSURE: 136 MMHG | OXYGEN SATURATION: 100 % | RESPIRATION RATE: 16 BRPM | BODY MASS INDEX: 34.46 KG/M2 | TEMPERATURE: 99 F

## 2021-06-23 DIAGNOSIS — Z17.0 MALIGNANT NEOPLASM OF OVERLAPPING SITES OF LEFT BREAST IN FEMALE, ESTROGEN RECEPTOR POSITIVE (H): ICD-10-CM

## 2021-06-23 DIAGNOSIS — C50.912 CARCINOMA OF LEFT BREAST METASTATIC TO BONE (H): ICD-10-CM

## 2021-06-23 DIAGNOSIS — C79.51 CARCINOMA OF LEFT BREAST METASTATIC TO BONE (H): Primary | ICD-10-CM

## 2021-06-23 DIAGNOSIS — G89.3 CANCER ASSOCIATED PAIN: ICD-10-CM

## 2021-06-23 DIAGNOSIS — C50.812 MALIGNANT NEOPLASM OF OVERLAPPING SITES OF LEFT BREAST IN FEMALE, ESTROGEN RECEPTOR POSITIVE (H): ICD-10-CM

## 2021-06-23 DIAGNOSIS — C50.912 CARCINOMA OF LEFT BREAST METASTATIC TO BONE (H): Primary | ICD-10-CM

## 2021-06-23 DIAGNOSIS — C79.51 CARCINOMA OF LEFT BREAST METASTATIC TO BONE (H): ICD-10-CM

## 2021-06-23 LAB
ALBUMIN SERPL-MCNC: 3.5 G/DL (ref 3.4–5)
ALP SERPL-CCNC: 73 U/L (ref 40–150)
ALT SERPL W P-5'-P-CCNC: 20 U/L (ref 0–50)
ANION GAP SERPL CALCULATED.3IONS-SCNC: 7 MMOL/L (ref 3–14)
AST SERPL W P-5'-P-CCNC: 17 U/L (ref 0–45)
BASOPHILS # BLD AUTO: 0.1 10E9/L (ref 0–0.2)
BASOPHILS NFR BLD AUTO: 1.1 %
BILIRUB SERPL-MCNC: 0.3 MG/DL (ref 0.2–1.3)
BUN SERPL-MCNC: 8 MG/DL (ref 7–30)
CALCIUM SERPL-MCNC: 9.1 MG/DL (ref 8.5–10.1)
CANCER AG27-29 SERPL-ACNC: 61 U/ML (ref 0–39)
CEA SERPL-MCNC: 1.4 UG/L (ref 0–2.5)
CHLORIDE SERPL-SCNC: 109 MMOL/L (ref 94–109)
CO2 SERPL-SCNC: 25 MMOL/L (ref 20–32)
CREAT SERPL-MCNC: 0.8 MG/DL (ref 0.52–1.04)
DIFFERENTIAL METHOD BLD: ABNORMAL
EOSINOPHIL # BLD AUTO: 0 10E9/L (ref 0–0.7)
EOSINOPHIL NFR BLD AUTO: 0.6 %
ERYTHROCYTE [DISTWIDTH] IN BLOOD BY AUTOMATED COUNT: 14.2 % (ref 10–15)
ESTRADIOL SERPL-MCNC: <11 PG/ML
FSH SERPL-ACNC: 8.8 IU/L
GFR SERPL CREATININE-BSD FRML MDRD: 88 ML/MIN/{1.73_M2}
GLUCOSE SERPL-MCNC: 93 MG/DL (ref 70–99)
HCT VFR BLD AUTO: 31.6 % (ref 35–47)
HGB BLD-MCNC: 10.3 G/DL (ref 11.7–15.7)
IMM GRANULOCYTES # BLD: 0 10E9/L (ref 0–0.4)
IMM GRANULOCYTES NFR BLD: 0.6 %
LYMPHOCYTES # BLD AUTO: 0.9 10E9/L (ref 0.8–5.3)
LYMPHOCYTES NFR BLD AUTO: 17.1 %
MCH RBC QN AUTO: 31.2 PG (ref 26.5–33)
MCHC RBC AUTO-ENTMCNC: 32.6 G/DL (ref 31.5–36.5)
MCV RBC AUTO: 96 FL (ref 78–100)
MONOCYTES # BLD AUTO: 0.6 10E9/L (ref 0–1.3)
MONOCYTES NFR BLD AUTO: 10.4 %
NEUTROPHILS # BLD AUTO: 3.8 10E9/L (ref 1.6–8.3)
NEUTROPHILS NFR BLD AUTO: 70.2 %
NRBC # BLD AUTO: 0 10*3/UL
NRBC BLD AUTO-RTO: 0 /100
PLATELET # BLD AUTO: 293 10E9/L (ref 150–450)
POTASSIUM SERPL-SCNC: 3.2 MMOL/L (ref 3.4–5.3)
PROT SERPL-MCNC: 7.4 G/DL (ref 6.8–8.8)
RBC # BLD AUTO: 3.3 10E12/L (ref 3.8–5.2)
SODIUM SERPL-SCNC: 141 MMOL/L (ref 133–144)
WBC # BLD AUTO: 5.4 10E9/L (ref 4–11)

## 2021-06-23 PROCEDURE — 36415 COLL VENOUS BLD VENIPUNCTURE: CPT

## 2021-06-23 PROCEDURE — 85025 COMPLETE CBC W/AUTO DIFF WBC: CPT | Performed by: PHYSICIAN ASSISTANT

## 2021-06-23 PROCEDURE — 82670 ASSAY OF TOTAL ESTRADIOL: CPT | Performed by: PHYSICIAN ASSISTANT

## 2021-06-23 PROCEDURE — 86300 IMMUNOASSAY TUMOR CA 15-3: CPT | Performed by: PHYSICIAN ASSISTANT

## 2021-06-23 PROCEDURE — 80053 COMPREHEN METABOLIC PANEL: CPT | Performed by: PHYSICIAN ASSISTANT

## 2021-06-23 PROCEDURE — 96402 CHEMO HORMON ANTINEOPL SQ/IM: CPT

## 2021-06-23 PROCEDURE — 250N000011 HC RX IP 250 OP 636: Performed by: INTERNAL MEDICINE

## 2021-06-23 PROCEDURE — 83001 ASSAY OF GONADOTROPIN (FSH): CPT | Performed by: PHYSICIAN ASSISTANT

## 2021-06-23 PROCEDURE — 82378 CARCINOEMBRYONIC ANTIGEN: CPT | Performed by: PHYSICIAN ASSISTANT

## 2021-06-23 RX ORDER — OXYCODONE HYDROCHLORIDE 5 MG/1
5-10 TABLET ORAL
Qty: 56 TABLET | Refills: 0 | Status: SHIPPED | OUTPATIENT
Start: 2021-06-23 | End: 2021-07-25

## 2021-06-23 RX ADMIN — GOSERELIN ACETATE 3.6 MG: 3.6 IMPLANT SUBCUTANEOUS at 12:51

## 2021-06-23 ASSESSMENT — PAIN SCALES - GENERAL: PAINLEVEL: WORST PAIN (10)

## 2021-06-23 NOTE — PROGRESS NOTES
Infusion Nursing Note:  Teresa Sanderson presents today for Zoladex.    Patient seen by provider today: No   present during visit today: Not Applicable.    Note: Teresa arrives to infusion today feeling OK. She reports a new tooth abscess in her left front tooth that popped up last week and resolved and is now flared up again. Denies fevers or chills. She has not been to dentist as she was unsure if she could with her current treatment. Writer informed her of importance of seeing a dentist as soon as possible. Last Zometa 4/20/21. WBC is WNL today. She also requests a refill on her oxycodone prescription today. She feels her low back pain is at her baseline and continues to be 10/10 often, although the pain medications do help bring it down at times.     Request sent to palliative care team [facility]     Post Infusion Assessment:  Patient tolerated injection without incident.  Zoladex given in RIGHT side abdomen with ice applied prior.     Discharge Plan:   Prescription refills given for oxycodone.  [facility]  Patient will return 7/19 for next appointment.   Patient discharged in stable condition accompanied by: self.  Departure Mode: Ambulatory.    Rut Ugalde RN

## 2021-06-23 NOTE — NURSING NOTE
Chief Complaint   Patient presents with     Blood Draw     labs drawn with vpt by rn.  vs taken     Labs drawn with vpt by rn.  Pt tolerated well.  VS taken.  Pt checked in for next appt.    Sabine Cid RN

## 2021-06-23 NOTE — TELEPHONE ENCOUNTER
Received telephone call from patient requesting refill of oxycodone.     Last refill: 6/9/21  Last office visit: 6/4/21  Scheduled for follow up 7/6/21     Will route request to MD for review.     Reviewed [place]  Report.

## 2021-06-23 NOTE — PATIENT INSTRUCTIONS
Last Zometa infusion: received 4/20/21    Contact Numbers  [facility]: [phone] (for symptom and scheduling needs)    Please call the [facility] Triage line if you experience a temperature greater than or equal to 100.4, shaking chills, have uncontrolled nausea, vomiting and/or diarrhea, dizziness, shortness of breath, chest pain, bleeding, unexplained bruising, or if you have any other new/concerning symptoms, questions or concerns.     If you are having any concerning symptoms or wish to speak to a provider before your next infusion visit, please call your care coordinator or triage to notify them so we can adequately serve you.     If you need a refill on a narcotic prescription or other medication, please call triage before your infusion appointment.          June 2021 Sunday Monday Tuesday Wednesday Thursday Friday Saturday             1    VIDEO VISIT RETURN   3:15 PM   (45 min.)   Alee Yang PA-C   [facility] 2     3     4    VIDEO VISIT NEW  12:30 PM   (80 min.)   Ayanna Tellez DO   [facility] 5       6     7     8     9    PE EYE/TH ONCOLOGY   1:45 PM   (45 min.)   [id number]   [facility] 10    VIDEO VISIT RETURN  11:15 AM   (45 min.)   Alee Yang PA-C   [facility] 11    VIDEO VISIT NEW   8:00 AM   (60 min.)   Maury Drake PsyD   [facility] 12       13     14     15     16     17     18     19       20     21    VIDEO VISIT RETURN   9:25 AM   (20 min.)   Santo Wiggins MD   [facility] [place] 22     23    LAB PERIPHERAL  12:00 PM   (15 min.)    [facility] DRAW   [facility]    ONC INFUSION 0.5 HR (30 MIN)  12:30 PM   (30 min.)    ONC INFUSION NURSE   [facility] 24     25     26       27     28     29     30                                 July 2021 Sunday Monday Tuesday Wednesday Thursday Friday Saturday                       1     2    VIDEO VISIT RETURN  12:00 PM   (60 min.)   Maury Drake PsyD   [facility] 3       4     5     6    VIDEO VISIT RETURN   2:45 PM   (40 min.)   Ayanna Tellez DO   [facility] 7     8     9     10       11     12     13    CANCER REHAB EVAL  12:45 PM   (60 min.)   Varsha Morfin PT   [facility] [place] 14     15     16     17       18     19    LAB PERIPHERAL  11:45 AM   (15 min.)   [facility] LAB DRAW   [facility]    ONC INFUSION 0.5 HR (30 MIN)  12:30 PM   (30 min.)    ONC INFUSION NURSE   [facility] 20    VIDEO VISIT RETURN  12:15 PM   (30 min.)   Jahaira Plunkett MD   [facility] 21     22     23     24       25     26     27     28     29     30     31                     Lab Results:  Recent Results (from the past 12 hour(s))   CBC with platelets differential    Collection Time: 06/23/21 12:23 PM   Result Value Ref Range    WBC 5.4 4.0 - 11.0 10e9/L    RBC Count 3.30 (L) 3.8 - 5.2 10e12/L    Hemoglobin 10.3 (L) 11.7 - 15.7 g/dL    Hematocrit 31.6 (L) 35.0 - 47.0 %    MCV 96 78 - 100 fl    MCH 31.2 26.5 - 33.0 pg    MCHC 32.6 31.5 - 36.5 g/dL    RDW 14.2 10.0 - 15.0 %    Platelet Count 293 150 - 450 10e9/L    Diff Method Automated Method     % Neutrophils 70.2 %    % Lymphocytes 17.1 %    % Monocytes 10.4 %    % Eosinophils 0.6 %    % Basophils 1.1 %    % Immature Granulocytes 0.6 %    Nucleated RBCs 0 0 /100    Absolute Neutrophil 3.8 1.6 - 8.3 10e9/L    Absolute Lymphocytes 0.9 0.8 - 5.3 10e9/L    Absolute Monocytes 0.6 0.0 - 1.3 10e9/L    Absolute Eosinophils 0.0 0.0 - 0.7 10e9/L    Absolute Basophils 0.1 0.0 - 0.2 10e9/L    Abs Immature Granulocytes 0.0 0 - 0.4 10e9/L    Absolute Nucleated RBC  0.0

## 2021-06-28 ENCOUNTER — TELEPHONE (OUTPATIENT)
Dept: PHYSICAL MEDICINE AND REHAB | Facility: CLINIC | Age: 46
End: 2021-06-28

## 2021-06-28 ENCOUNTER — TELEPHONE (OUTPATIENT)
Dept: ONCOLOGY | Facility: CLINIC | Age: 46
End: 2021-06-28

## 2021-06-28 NOTE — TELEPHONE ENCOUNTER
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 RODGERS to the headache clinic.    Please review and contact the patient to schedule.    Action Taken: Other: PMR    Travel Screening: Not Applicable

## 2021-06-28 NOTE — TELEPHONE ENCOUNTER
Oral Chemotherapy Monitoring Program    Placed call to patient in follow up of olaparib therapy.    Left message to please call back in follow up of therapy. No patient or drug names were mentioned.    [place]  Pharmacy Intern  [facility]  [phone]

## 2021-06-30 DIAGNOSIS — C50.812 MALIGNANT NEOPLASM OF OVERLAPPING SITES OF LEFT BREAST IN FEMALE, ESTROGEN RECEPTOR POSITIVE (H): ICD-10-CM

## 2021-06-30 DIAGNOSIS — Z17.0 MALIGNANT NEOPLASM OF OVERLAPPING SITES OF LEFT BREAST IN FEMALE, ESTROGEN RECEPTOR POSITIVE (H): ICD-10-CM

## 2021-06-30 DIAGNOSIS — C50.912 CARCINOMA OF LEFT BREAST METASTATIC TO BONE (H): ICD-10-CM

## 2021-06-30 DIAGNOSIS — C79.51 CARCINOMA OF LEFT BREAST METASTATIC TO BONE (H): ICD-10-CM

## 2021-07-02 ENCOUNTER — VIRTUAL VISIT (OUTPATIENT)
Dept: ONCOLOGY | Facility: CLINIC | Age: 46
End: 2021-07-02
Payer: COMMERCIAL

## 2021-07-02 DIAGNOSIS — Z53.9 ERRONEOUS ENCOUNTER--DISREGARD: Primary | ICD-10-CM

## 2021-07-02 NOTE — LETTER
7/2/2021         RE: Teresa Sanderson  [address], [place], [place] [facility] [address]        Dear Colleague,    Thank you for referring your patient, Teresa Sanderson, to the [facility]. Please see a copy of my visit note below.      This encounter was opened in error. Please disregard.      Again, thank you for allowing me to participate in the care of your patient.        Sincerely,        Maury Drake PsyD

## 2021-07-05 DIAGNOSIS — C50.812 MALIGNANT NEOPLASM OF OVERLAPPING SITES OF LEFT BREAST IN FEMALE, ESTROGEN RECEPTOR POSITIVE (H): ICD-10-CM

## 2021-07-05 DIAGNOSIS — C50.912 CARCINOMA OF LEFT BREAST METASTATIC TO BONE (H): Primary | ICD-10-CM

## 2021-07-05 DIAGNOSIS — Z17.0 MALIGNANT NEOPLASM OF OVERLAPPING SITES OF LEFT BREAST IN FEMALE, ESTROGEN RECEPTOR POSITIVE (H): ICD-10-CM

## 2021-07-05 DIAGNOSIS — C79.51 CARCINOMA OF LEFT BREAST METASTATIC TO BONE (H): Primary | ICD-10-CM

## 2021-07-06 NOTE — TELEPHONE ENCOUNTER
This case will be discussed with pain clinic and possible referral may be placed by referring provider. Will close encounter at this time.

## 2021-07-07 ENCOUNTER — TELEPHONE (OUTPATIENT)
Dept: ONCOLOGY | Facility: CLINIC | Age: 46
End: 2021-07-07

## 2021-07-07 DIAGNOSIS — C50.919 METASTATIC BREAST CANCER: ICD-10-CM

## 2021-07-07 DIAGNOSIS — F41.8 MIXED ANXIETY AND DEPRESSIVE DISORDER: Primary | ICD-10-CM

## 2021-07-07 DIAGNOSIS — R45.89 DIFFICULTY COPING: ICD-10-CM

## 2021-07-07 NOTE — TELEPHONE ENCOUNTER
"Social Work Progress Note      Data/Intervention:  Patient Name:  Teresa Sanderson  /Age:  1975 (45 year old)    Reason for Follow-Up:  Teresa \"Andrea- Esperanza\" Kin is a 45-year-old woman with a diagnosis of metastatic breast cancer who is followed oncologically by Alee Yang and Jahaira Plunkett at the [facility]. Teresa also follows with Ayanna Stoner for palliative care. This clinician received voicemail today from [facility] requesting support with accessing PCA services.     Intervention:   SVETA unable to leave voicemail with [facility] due to mailbox being full. SVETA sent detailing MyChart to [facility] with additional information about accessing PCA services through the [facility].     Plan:  Provided patient/family with writer's contact information and availability. Will continue to be available as needed for ongoing psychosocial support.     Please call or page if needs or concerns arise.     DANA Pearson, [facility]  Direct Phone: [phone]  Pager: [phone]  "

## 2021-07-07 NOTE — ORAL ONC MGMT
Oral Chemotherapy Monitoring Program    Subjective/Objective:  Teresa Sanderson is a 45 year old female contacted by phone for a follow-up visit for oral chemotherapy.  Teresa confirmed she takes 1 tablet (125 mg) of palbociclib daily and she is on a cycle of 21 days on and 7 days off. She reports that she missed about 5 doses of palbociclib this cycle because she had severe back pain and she could not take the medication at the normal time she takes it (10pm daily) so she just skipped it when she couldn't take it on time. She is unsure when she started this cycle exactly, but she has 13 days of palbociclib left on hand, and she thinks she started around 6/22. She reports that loss of appetite is the only side effect she notices but it is not concerning. She denies nausea, vomiting, or diarrhea. She reports having normal bowel movements. She denies noticing any other side effects while on palbociclib.     She expressed that she would like to get counseling for her mental health. She wants to talk with someone about her deteriorating health.    She mentioned that she finished the last dose of Arimidex yesterday night. She said that she did not get her bottle of Arimidex last time she left the hospital but she was told that there is a bottle ready for her. She said that she will go to pick it up today and contact us if she has issues getting it.    ORAL CHEMOTHERAPY 5/1/2021 5/13/2021 6/5/2021 6/10/2021 6/12/2021 6/28/2021 7/7/2021   Assessment Type Chart Review Refill Chart Review Refill Chart Review Monthly Follow up;Other Monthly Follow up;Other   Diagnosis Code Breast Cancer Breast Cancer Breast Cancer Breast Cancer Breast Cancer Breast Cancer Breast Cancer   Providers Dr. Dimitrios Plunkett   [facility]   Drug Name Ibrance (palbociclib) Ibrance (palbociclib) Ibrance  (palbociclib) Ibrance (palbociclib) Ibrance (palbociclib) Ibrance (palbociclib) Ibrance (palbociclib)   Dose 125 mg 125 mg 125 mg 125 mg 125 mg 125 mg 125 mg   Current Schedule Daily Daily Daily Daily Daily Daily Daily   Cycle Details 3 weeks on, 1 week off 3 weeks on, 1 week off 3 weeks on, 1 week off 3 weeks on, 1 week off 3 weeks on, 1 week off 3 weeks on, 1 week off 3 weeks on, 1 week off   Start Date of Last Cycle - - - - - - 6/24/2021   Planned next cycle start date - - - - - - -   Doses missed in last 2 weeks - - - - - - more   Adherence Assessment - - - - - - Non-adherent   Reason for Non-adherence - - - - - - Other   Adherence Intervention Recommended - - - - - - None   Adverse Effects - - - - - - No AE identified during assessment   Any new drug interactions? - - - - - - -   Pharmacist Intervention? - - - - - - -   Intervention(s) - - - - - - -   Is the dose as ordered appropriate for the patient? - - - - - - -       Last PHQ-2 Score on record:   PHQ-2 ( 1999 [employer]) 5/13/2021   Q1: Little interest or pleasure in doing things 1   Q2: Feeling down, depressed or hopeless 1   PHQ-2 Score 2       Vitals:  BP:   BP Readings from Last 1 Encounters:   06/23/21 136/86     Wt Readings from Last 1 Encounters:   06/23/21 115.3 kg (254 lb 1.6 oz)     Estimated body surface area is 2.42 meters squared as calculated from the following:    Height as of 5/17/21: 1.829 m (6').    Weight as of 6/23/21: 115.3 kg (254 lb 1.6 oz).    Labs:  _  Result Component Current Result Ref Range   Sodium 141 (6/23/2021) 133 - 144 mmol/L     _  Result Component Current Result Ref Range   Potassium 3.2 (L) (6/23/2021) 3.4 - 5.3 mmol/L     _  Result Component Current Result Ref Range   Calcium 9.1 (6/23/2021) 8.5 - 10.1 mg/dL     No results found for Mag within last 30 days.     No results found for Phos within last 30 days.     _  Result Component Current Result Ref Range   Albumin 3.5 (6/23/2021) 3.4 - 5.0 g/dL     _  Result Component  "Current Result Ref Range   Urea Nitrogen 8 (6/23/2021) 7 - 30 mg/dL     _  Result Component Current Result Ref Range   Creatinine 0.80 (6/23/2021) 0.52 - 1.04 mg/dL     _  Result Component Current Result Ref Range   AST 17 (6/23/2021) 0 - 45 U/L     _  Result Component Current Result Ref Range   ALT 20 (6/23/2021) 0 - 50 U/L     _  Result Component Current Result Ref Range   Bilirubin Total 0.3 (6/23/2021) 0.2 - 1.3 mg/dL     _  Result Component Current Result Ref Range   WBC 5.4 (6/23/2021) 4.0 - 11.0 10e9/L     _  Result Component Current Result Ref Range   Hemoglobin 10.3 (L) (6/23/2021) 11.7 - 15.7 g/dL     _  Result Component Current Result Ref Range   Platelet Count 293 (6/23/2021) 150 - 450 10e9/L     _  Result Component Current Result Ref Range   Absolute Neutrophil 3.8 (6/23/2021) 1.6 - 8.3 10e9/L         Assessment/Plan:  Teresa is tolerating palbociclib therapy well with manageable side effects. However, she was nonadherent this cycle because of her back pain that is unassociated with taking palbociclib.     Follow-Up:  Sent IB to Dr. Plunkett, Alee Yang, and Miranda Cunha regarding patient missing 5 doses and patient's request for mental health counseling. Follow up with patient regarding provider's reply.  7/20 Review Dr. Plunkett appt.    Dr. Plunkett replied at 1541 with the following IB message: \"I recommend patient take all 21 doses of Ibrance for this cycle. I have placed a referral to Health psychology and have asked our schedulers to schedule it.\"    I placed call to [facility] again trying to relay this message, but she was unavailable to . LVM to [facility] for her to call back regarding therapy plan. No medication or patient name was mentioned.    Shazia Javed  Pharmacy Intern  [facility]  [phone]  "

## 2021-07-08 ENCOUNTER — TELEPHONE (OUTPATIENT)
Dept: ONCOLOGY | Facility: CLINIC | Age: 46
End: 2021-07-08

## 2021-07-08 DIAGNOSIS — C79.51 CARCINOMA OF LEFT BREAST METASTATIC TO BONE (H): Primary | ICD-10-CM

## 2021-07-08 DIAGNOSIS — C50.912 CARCINOMA OF LEFT BREAST METASTATIC TO BONE (H): Primary | ICD-10-CM

## 2021-07-08 DIAGNOSIS — C50.812 MALIGNANT NEOPLASM OF OVERLAPPING SITES OF LEFT BREAST IN FEMALE, ESTROGEN RECEPTOR POSITIVE (H): ICD-10-CM

## 2021-07-08 DIAGNOSIS — Z17.0 MALIGNANT NEOPLASM OF OVERLAPPING SITES OF LEFT BREAST IN FEMALE, ESTROGEN RECEPTOR POSITIVE (H): ICD-10-CM

## 2021-07-08 RX ORDER — ANASTROZOLE 1 MG/1
1 TABLET ORAL DAILY
Qty: 28 TABLET | Refills: 0 | Status: SHIPPED | OUTPATIENT
Start: 2021-07-08 | End: 2021-07-29

## 2021-07-08 NOTE — ORAL ONC MGMT
Oral Chemotherapy Monitoring Program     Placed call to patient in follow up of Ibrance therapy. Dr. Plunkett would like Teresa to finish taking all of the 21 doses of Ibrance and then take the 7 day break. Teresa expressed understanding and agreement with this plan. Also discussed adherence strategies, record keeping and how to handle missed doses. She thanked me for the call.    Jonny GriffinD  [facility]  [phone]  July 8, 2021

## 2021-07-09 ENCOUNTER — PATIENT OUTREACH (OUTPATIENT)
Dept: CARE COORDINATION | Facility: CLINIC | Age: 46
End: 2021-07-09

## 2021-07-09 NOTE — PROGRESS NOTES
Social Work Note: Telephone Call  Oncology Clinic    Data/Intervention:  Patient Name:  Teresa Sanderson  /Age:  1975 (45 year old)    Call From:  SVETA  Reason for Call:  Psychosocial follow up    Assessment:  SVETA called and spoke to Teresa this afternoon, following up on recent  outreach for PCA services. SVETA spoke to Teresa and reviewed primary SVETA Veliz's recent notes with her, advisign Teresa that Keren had sent a fairly detailed [employer] message with information on accessing PCA support. Teresa states she hasn't read the message because she forgot her [employer] password. She did say she could go in and reset it to get the information. SVETA encouraged Teresa to do so, but also advised that if she had trouble she could call SVETA back and SVETA will assist with emailing the information. Teresa agreed.     Plan:  Teresa will contact SVETA with further needs or PCA questions.     DANA Callahan, [facility]  Clinical , Adult Oncology  Phone: [phone]

## 2021-07-13 ENCOUNTER — THERAPY VISIT (OUTPATIENT)
Dept: PHYSICAL THERAPY | Facility: CLINIC | Age: 46
End: 2021-07-13
Attending: PHYSICIAN ASSISTANT
Payer: COMMERCIAL

## 2021-07-13 DIAGNOSIS — R53.81 PHYSICAL DECONDITIONING: ICD-10-CM

## 2021-07-13 DIAGNOSIS — C50.919 METASTATIC BREAST CANCER: ICD-10-CM

## 2021-07-13 DIAGNOSIS — C79.51 BONE METASTASIS: ICD-10-CM

## 2021-07-13 PROCEDURE — 97110 THERAPEUTIC EXERCISES: CPT | Mod: GP | Performed by: PHYSICAL THERAPIST

## 2021-07-13 PROCEDURE — 97161 PT EVAL LOW COMPLEX 20 MIN: CPT | Mod: GP | Performed by: PHYSICAL THERAPIST

## 2021-07-13 ASSESSMENT — 6 MINUTE WALK TEST (6MWT): TOTAL DISTANCE WALKED (FT): 428

## 2021-07-13 NOTE — DISCHARGE INSTRUCTIONS
1. Exercises daily  2. Walking program: use walker and start with 5 minutes then increase 30 seconds everyday  3. Monitor signs of your back pain and rest when needed    Jessa Morfin PT, DPT  Physical Therapist  [facility] [place] - [address]  [address] [place] [address]  [email or web address]  Appointments: [phone]

## 2021-07-15 NOTE — PROGRESS NOTES
07/13/21 1200   Quick Adds   Type of Visit Initial OP PT Evaluation   General Information   Start of Care Date 07/13/21   Referring Physician Alee Ynag PA-C   Orders Evaluate and Treat as Indicated   Order Date 06/10/21   Medical Diagnosis Metastatic breast cancer, physical deconditioning, bone metastasis   Onset of illness/injury or Date of Surgery 06/10/21   Surgical/Medical history reviewed Yes   Pertinent history of current problem (include personal factors and/or comorbidities that impact the POC) Patient diagnosed with ER positive left breast cancer with bone metastasis in December 2020.  Patient underwent radiation in January 2021.  PET scan on June 9, 2021 demonstrated CR.  Patient reports ongoing symptoms including back pain and shortness of breath when walking more than 10 minutes.  Past medical history: Anxiety, depression, DVT, schizoaffective disorder, pulmonary emboli. Patient reports large increase in back pain when bending down or going up stairs (has to crawl up). Patient also reports cannot stand for >1 minute or walk long distances.    Pertinent Visual History  Patient denies changes   Prior level of function comment Independent   Current Community Support Family/friend caregiver  (helps with cooking, laundry)   Patient role/Employment history Unemployed   Living environment [place]   Home/Community Accessibility Comments has 5 stairs    Current Assistive Devices Front Wheeled Walker   Assistive Devices Comments Uses [employer] for long distances-stores, community mobility   Fall Risk Screen   Fall screen completed by PT   Have you fallen 2 or more times in the past year? No   Have you fallen and had an injury in the past year? No   Is patient a fall risk? No   Vitals Signs   Heart Rate 75   SpO2 96   Vital Signs Comments s/p 6MWT 96% SpO2, HR 85   Cognitive Status Examination   Orientation orientation to person, place and time   Level of Consciousness alert   Follows Commands and  Answers Questions 100% of the time;able to follow multistep instructions   Personal Safety and Judgment intact   Memory intact   Posture   Posture Forward head position;Protracted shoulders   Range of Motion (ROM)   ROM Comment B ankle DF neutral. Tightness in B hamstrings   Strength   Strength Comments L hip flexion 3-/5, R hip flexion 3+/5, B hip extension 2/5, B hip abduction 3/5   Bed Mobility   Bed Mobility Comments Modified independent; increased time to complete due to pain   Transfer Skills   Transfer Comments Modified independent; increasd time to complete   Gait   Gait Comments Patient ambulates without device, modified independent- compensatory R trunk lean and flexion for pain relief. Reduced priscila   Gait Special Tests   Gait Special Tests SIX MINUTE WALK TEST   Gait Special Tests Six Minute Walk Test   Feet 428 Feet   Balance   Balance Comments Defer to future sessions; imbalance mostly secondary to pain at this point   Planned Therapy Interventions   Planned Therapy Interventions gait training;neuromuscular re-education;joint mobilization;orthotic fitting/training;ROM;strengthening;stretching;transfer training;manual therapy   Clinical Impression   Criteria for Skilled Therapeutic Interventions Met yes, treatment indicated   PT Diagnosis deconditioning   Influenced by the following impairments pain, posture, strength, ROM, tissue mobility, cardiovascular deconditioning   Functional limitations due to impairments impaired participation in ADLs and IADLs, deconditioning   Clinical Presentation Stable/Uncomplicated   Clinical Presentation Rationale personal factors, body systems involved   Clinical Decision Making (Complexity) Low complexity   Therapy Frequency 1 time/week   Predicted Duration of Therapy Intervention (days/wks) 4-6 weeks   Risk & Benefits of therapy have been explained Yes   Patient, Family & other staff in agreement with plan of care Yes   Clinical Impression Comments Patient is a 45  year old female who presents to OP PT with reports of back pain and deconditioning following breast cancer diagnosis and treatment. Patient demonstrates proximal strength, ROM and elevated pain symptoms with movement which is impacting her independence in ADLs/iADLs. Patient would benefit from skilled PT for functional upgrading and improved activity participation   Education Assessment   Preferred Learning Style Demonstration;Pictures/video   Barriers to Learning No barriers   GOALS   PT Eval Goals 1;2;3   Goal 1   Goal Identifier 6MWT   Goal Description Patient will ambulate at least 498 meters on the 6MWT for improved activity tolerance   Target Date 10/10/21   Goal 2   Goal Identifier Pain   Goal Description Patient will report less than 2 point increase in pain during ADLs for improved participation   Target Date 10/10/21   Goal 3   Goal Identifier HEP   Goal Description Patient will be independent in HEP for maintenance of therapy gains at d/c   Target Date 10/10/21   Total Evaluation Time   PT Eval, Low Complexity Minutes (02881) 30     Jessa Morfin PT, DPT  Physical Therapist  [facility] [address]  [address] [place] [address]  [email or web address]  Appointments: [phone]

## 2021-07-19 ENCOUNTER — LAB (OUTPATIENT)
Dept: LAB | Facility: CLINIC | Age: 46
End: 2021-07-19
Attending: INTERNAL MEDICINE
Payer: COMMERCIAL

## 2021-07-19 ENCOUNTER — INFUSION THERAPY VISIT (OUTPATIENT)
Dept: ONCOLOGY | Facility: CLINIC | Age: 46
End: 2021-07-19
Attending: INTERNAL MEDICINE
Payer: COMMERCIAL

## 2021-07-19 VITALS
RESPIRATION RATE: 16 BRPM | SYSTOLIC BLOOD PRESSURE: 133 MMHG | WEIGHT: 245.8 LBS | BODY MASS INDEX: 33.34 KG/M2 | DIASTOLIC BLOOD PRESSURE: 85 MMHG | HEART RATE: 70 BPM | OXYGEN SATURATION: 97 % | TEMPERATURE: 98 F

## 2021-07-19 DIAGNOSIS — C79.51 SPINE METASTASIS: Primary | ICD-10-CM

## 2021-07-19 DIAGNOSIS — C50.919 METASTATIC BREAST CANCER: ICD-10-CM

## 2021-07-19 DIAGNOSIS — C50.912 CARCINOMA OF LEFT BREAST METASTATIC TO BONE (H): ICD-10-CM

## 2021-07-19 DIAGNOSIS — Z17.0 MALIGNANT NEOPLASM OF OVERLAPPING SITES OF LEFT BREAST IN FEMALE, ESTROGEN RECEPTOR POSITIVE (H): ICD-10-CM

## 2021-07-19 DIAGNOSIS — C79.51 CARCINOMA OF LEFT BREAST METASTATIC TO BONE (H): ICD-10-CM

## 2021-07-19 DIAGNOSIS — C50.812 MALIGNANT NEOPLASM OF OVERLAPPING SITES OF LEFT BREAST IN FEMALE, ESTROGEN RECEPTOR POSITIVE (H): ICD-10-CM

## 2021-07-19 LAB
ALBUMIN SERPL-MCNC: 3.3 G/DL (ref 3.4–5)
ALP SERPL-CCNC: 86 U/L (ref 40–150)
ALT SERPL W P-5'-P-CCNC: 16 U/L (ref 0–50)
ANION GAP SERPL CALCULATED.3IONS-SCNC: <1 MMOL/L (ref 3–14)
AST SERPL W P-5'-P-CCNC: 10 U/L (ref 0–45)
BASOPHILS # BLD MANUAL: 0.1 10E3/UL (ref 0–0.2)
BASOPHILS NFR BLD MANUAL: 3 %
BILIRUB SERPL-MCNC: 0.2 MG/DL (ref 0.2–1.3)
BUN SERPL-MCNC: 12 MG/DL (ref 7–30)
CALCIUM SERPL-MCNC: 8.8 MG/DL (ref 8.5–10.1)
CANCER AG27-29 SERPL-ACNC: 58 U/ML (ref 0–39)
CEA SERPL-MCNC: 1.7 UG/L (ref 0–2.5)
CHLORIDE BLD-SCNC: 115 MMOL/L (ref 94–109)
CO2 SERPL-SCNC: 27 MMOL/L (ref 20–32)
CREAT SERPL-MCNC: 1.07 MG/DL (ref 0.52–1.04)
EOSINOPHIL # BLD MANUAL: 0.1 10E3/UL (ref 0–0.7)
EOSINOPHIL NFR BLD MANUAL: 2 %
ERYTHROCYTE [DISTWIDTH] IN BLOOD BY AUTOMATED COUNT: 14.6 % (ref 10–15)
FRAGMENTS BLD QL SMEAR: SLIGHT
GFR SERPL CREATININE-BSD FRML MDRD: 63 ML/MIN/1.73M2
GLUCOSE BLD-MCNC: 120 MG/DL (ref 70–99)
HCT VFR BLD AUTO: 34 % (ref 35–47)
HGB BLD-MCNC: 11.1 G/DL (ref 11.7–15.7)
LYMPHOCYTES # BLD MANUAL: 1 10E3/UL (ref 0.8–5.3)
LYMPHOCYTES NFR BLD MANUAL: 37 %
MCH RBC QN AUTO: 30.7 PG (ref 26.5–33)
MCHC RBC AUTO-ENTMCNC: 32.6 G/DL (ref 31.5–36.5)
MCV RBC AUTO: 94 FL (ref 78–100)
MONOCYTES # BLD MANUAL: 0.1 10E3/UL (ref 0–1.3)
MONOCYTES NFR BLD MANUAL: 5 %
NEUTROPHILS # BLD MANUAL: 1.4 10E3/UL (ref 1.6–8.3)
NEUTROPHILS NFR BLD MANUAL: 53 %
PLAT MORPH BLD: ABNORMAL
PLATELET # BLD AUTO: 190 10E3/UL (ref 150–450)
POTASSIUM BLD-SCNC: 3.8 MMOL/L (ref 3.4–5.3)
PROT SERPL-MCNC: 7.4 G/DL (ref 6.8–8.8)
RBC # BLD AUTO: 3.61 10E6/UL (ref 3.8–5.2)
RBC MORPH BLD: ABNORMAL
SODIUM SERPL-SCNC: 142 MMOL/L (ref 133–144)
WBC # BLD AUTO: 2.7 10E3/UL (ref 4–11)

## 2021-07-19 PROCEDURE — 85027 COMPLETE CBC AUTOMATED: CPT

## 2021-07-19 PROCEDURE — 82040 ASSAY OF SERUM ALBUMIN: CPT

## 2021-07-19 PROCEDURE — 36415 COLL VENOUS BLD VENIPUNCTURE: CPT

## 2021-07-19 PROCEDURE — 86300 IMMUNOASSAY TUMOR CA 15-3: CPT

## 2021-07-19 PROCEDURE — 250N000011 HC RX IP 250 OP 636: Performed by: PHYSICIAN ASSISTANT

## 2021-07-19 PROCEDURE — 82378 CARCINOEMBRYONIC ANTIGEN: CPT

## 2021-07-19 PROCEDURE — 96402 CHEMO HORMON ANTINEOPL SQ/IM: CPT

## 2021-07-19 RX ADMIN — GOSERELIN ACETATE 3.6 MG: 3.6 IMPLANT SUBCUTANEOUS at 13:06

## 2021-07-19 ASSESSMENT — PAIN SCALES - GENERAL: PAINLEVEL: SEVERE PAIN (7)

## 2021-07-19 NOTE — NURSING NOTE
Chief Complaint   Patient presents with     Blood Draw     Labs drawn via PIV placed by RN in lab. Vs taken.     Labs drawn from PIV placed by RN. Line flushed with saline. Vitals taken. Pt checked in for appointment(s).    Roxy DAWKINS RN PHN BSN  BMT/Oncology Lab

## 2021-07-19 NOTE — PATIENT INSTRUCTIONS
[facility] Triage and after hours / weekends / holidays:  [phone]    Please call the triage or after hours line if you experience a temperature greater than or equal to 100.5, shaking chills, have uncontrolled nausea, vomiting and/or diarrhea, dizziness, shortness of breath, chest pain, bleeding, unexplained bruising, or if you have any other new/concerning symptoms, questions or concerns.      If you are having any concerning symptoms or wish to speak to a provider before your next infusion visit, please call your care coordinator or triage to notify them so we can adequately serve you.     If you need a refill on a narcotic prescription or other medication, please call before your infusion appointment.           July 2021 Sunday Monday Tuesday Wednesday Thursday Friday Saturday                       1     2    VIDEO VISIT RETURN  12:00 PM   (60 min.)   Maury Drake PsyD   [facility] 3       4     5     6     7     8     9     10       11     12     13    CANCER REHAB EVAL  12:45 PM   (60 min.)   Varsha Morfin PT   [facility]    VIDEO VISIT RETURN   3:25 PM   (40 min.)   Ayanna Tellez DO   [facility] 14     15     16     17       18     19    LAB PERIPHERAL  11:45 AM   (15 min.)   [facility] DRAW   [facility]    ONC INFUSION 0.5 HR (30 MIN)  12:30 PM   (30 min.)    ONC INFUSION NURSE   [facility] 20    VIDEO VISIT RETURN  12:15 PM   (30 min.)   Jahaira Plunkett MD   [facility] 21     22     23     24       25     26     27     28     29     30     31                 August 2021 Sunday Monday Tuesday Wednesday Thursday Friday Saturday   1     2     3     4     5     6     7       8  Happy Birthday!     9     10     11     12     13     14       15     16    LAB PERIPHERAL   1:00  PM   (15 min.)   [facility] LAB DRAW   [facility]    RETURN   1:15 PM   (45 min.)   Alee Yang PA-C   [facility]    ONC INFUSION 0.5 HR (30 MIN)   2:30 PM   (30 min.)    ONC INFUSION NURSE   [facility] 17     18     19     20     21       22     23     24     25     26     27     28       29     30     31                                        Recent Results (from the past 24 hour(s))   Comprehensive metabolic panel    Collection Time: 07/19/21 12:28 PM   Result Value Ref Range    Sodium 142 133 - 144 mmol/L    Potassium 3.8 3.4 - 5.3 mmol/L    Chloride 115 (H) 94 - 109 mmol/L    Carbon Dioxide (CO2) 27 20 - 32 mmol/L    Anion Gap <1 (L) 3 - 14 mmol/L    Urea Nitrogen 12 7 - 30 mg/dL    Creatinine 1.07 (H) 0.52 - 1.04 mg/dL    Calcium 8.8 8.5 - 10.1 mg/dL    Glucose 120 (H) 70 - 99 mg/dL    Alkaline Phosphatase 86 40 - 150 U/L    AST 10 0 - 45 U/L    ALT 16 0 - 50 U/L    Protein Total 7.4 6.8 - 8.8 g/dL    Albumin 3.3 (L) 3.4 - 5.0 g/dL    Bilirubin Total 0.2 0.2 - 1.3 mg/dL    GFR Estimate 63 >60 mL/min/1.73m2   CBC with platelets and differential    Collection Time: 07/19/21 12:28 PM   Result Value Ref Range    WBC Count 2.7 (L) 4.0 - 11.0 10e3/uL    RBC Count 3.61 (L) 3.80 - 5.20 10e6/uL    Hemoglobin 11.1 (L) 11.7 - 15.7 g/dL    Hematocrit 34.0 (L) 35.0 - 47.0 %    MCV 94 78 - 100 fL    MCH 30.7 26.5 - 33.0 pg    MCHC 32.6 31.5 - 36.5 g/dL    RDW 14.6 10.0 - 15.0 %    Platelet Count 190 150 - 450 10e3/uL   Manual Differential    Collection Time: 07/19/21 12:28 PM   Result Value Ref Range    % Neutrophils 53 %    % Lymphocytes 37 %    % Monocytes 5 %    % Eosinophils 2 %    % Basophils 3 %    Absolute Neutrophils 1.4 (L) 1.6 - 8.3 10e3/uL    Absolute Lymphocytes 1.0 0.8 - 5.3 10e3/uL    Absolute Monocytes 0.1 0.0 - 1.3 10e3/uL    Absolute Eosinophils 0.1 0.0 - 0.7 10e3/uL    Absolute Basophils 0.1 0.0 - 0.2  10e3/uL    RBC Morphology Confirmed RBC Indices     Platelet Assessment  Automated Count Confirmed. Platelet morphology is normal.     Automated Count Confirmed. Platelet morphology is normal.    RBC Fragments Slight (A) None Seen

## 2021-07-19 NOTE — PROGRESS NOTES
Teresa is a 45 year old who is being evaluated via a billable video visit.      How would you like to obtain your AVS? MyChart  If the video visit is dropped, the invitation should be resent by: Text to cell phone: [phone]  Will anyone else be joining your video visit? No    Vitals - Patient Reported  Weight (Patient Reported): 111.5 kg (245 lb 13 oz)  Height (Patient Reported): 182.9 cm (6')  BMI (Based on Pt Reported Ht/Wt): 33.34  Pain Score: Moderate Pain (5)  Pain Loc: Chest    Video-Visit Details    Type of service:  Video Visit    35 minutes spent on the date of the encounter doing chart review, review of test results, interpretation of tests, patient visit and documentation       Originating Location (pt. Location): Home    Distant Location (provider location):  [facility]     Platform used for Video Visit: Chad De Leon MA    Oncology Visit:   Date on this visit: 7/20/2021    Diagnosis:  ER positive left breast cancer metastasized to bones.     Primary Physician: No Ref-Primary, Physician     History Of Present Illness:    Ms. Sanderson is a 45 year old female with a h/o tobacco abuse and DVTs with left breast cancer metastasized to bone. She presented to [facility] ED with back pain on 12/5/2020. MRI of the L-spine showed an abnormal L4 lesion with associated right paraspinal mass, abnormalities in L5 and the left iliac bone were also seen. CT C/A/P showed a left breast mass, lytic lesions of T7, L4, and the pelvis, and a 3 cm lesion in the kidney (thought to be a cyst). Ultrasound of the bilateral lower extremities showed a non-occlusive thrombus in the left popliteal vein. Mammogram and ultrasound of the bilateral breasts on 12/17/2020 showed a spiculated mass measuring at least 7.8 cm at 12-1:00 left breast extending from the nipple to 9 cm from the nipple with associated nipple retraction. This mass was biopsied, and showed IDC with surrounding DCIS,  grade 3, ER+ 90%, and MT+ 75%.  HER2 was equivocal in approximately 35% of tumor cells by FISH and was negative by IHC.    Metastatic Breast Cancer Treatment:  12/23/2021 - 1/7/2021  Radiation (3000 cGy) to the lumbar spine.  1/29/2021 - present  Ibrance, zoladex, and anastrozole.  5/17/2021 radiofrequency ablation, kyphoplasty to L4    Interval History:  Ms. Sanderson was seen via video visit today for metastatic breast cancer follow-up.  She continues on treatment with Ibrance, Zoladex, and anastrozole.  Overall she is tolerating this regimen well.  Most bothersome to her at this time is right upper extremity/right chest wall and low back pain.  She reports the right arm and lateral chest wall pain has been present for 2 weeks.  It is a constant pain that is worse with stretching type movements.  She also reports the pain is worse if she takes a deep breath.  Her low back pain is chronic and is unchanged from previous.  She had some relief after kyphoplasty, however the time that it helped was pretty short.  She is taking pain medicine 3 times a day.  When her medicine runs out, her pain is quite bad and the pain medicine is the only thing that controls her pain.  She is also using lidocaine patches and heat packs.  She has not tried massage as she is afraid that it could make the pain worse.  She continues to work with physical therapy and as such has been more active.  She denies other new bone or joint aches or pains.  She has no current cough, shortness of breath, or chest pain.  She has no current abdominal complaints.  She denies infectious complaints with the exception of abscesses in her mouth.  She states that these have been present for a few weeks.  She admits that she has very poor dentition and does not know where she can see a dentist.  The remainder of a complete 12 point review of systems is reviewed with patient was negative with exception that mentioned above.    Past Medical/Surgical History:    Past Medical History:   Diagnosis Date     Anxiety      Breast CA (H) 12/2020     Depression      DVT (deep venous thrombosis) (H) 2014     Left breast mass     x approximately 4-5 months     Pulmonary emboli (H)      Pyelonephritis      Schizoaffective disorder (H)      Tobacco use      Past Surgical History:   Procedure Laterality Date     IR LUMBAR KYPHOPLASTY VERTEBRAE  5/17/2021     TUBAL LIGATION  1998        Allergies   Allergen Reactions     Contrast Dye      Pt developed nausea after isovue 370 injection on 6/9/21        Current Outpatient Medications   Medication     anastrozole (ARIMIDEX) 1 MG tablet     doxepin (SILENOR) 6 MG tablet     gabapentin (NEURONTIN) 300 MG capsule     methocarbamol (ROBAXIN) 500 MG tablet     morphine (MS CONTIN) 15 MG CR tablet     naloxone (NARCAN) 4 MG/0.1ML nasal spray     ondansetron (ZOFRAN-ODT) 4 MG ODT tab     oxyCODONE (ROXICODONE) 5 MG tablet     palbociclib (IBRANCE) 125 MG tablet     pantoprazole (PROTONIX) 40 MG EC tablet     polyethylene glycol (MIRALAX) 17 GM/Dose powder     prochlorperazine (COMPAZINE) 10 MG tablet     QUEtiapine (SEROQUEL) 100 MG tablet     rivaroxaban ANTICOAGULANT (XARELTO) 20 MG TABS tablet     SENNA-docusate sodium (SENNA S) 8.6-50 MG tablet     sertraline (ZOLOFT) 50 MG tablet     No current facility-administered medications for this visit.   '    Family and Social History:   Please see initial consultation dated 12/22/2020 for further details.    Physical Exam:   General: Adult female in NAD.  Alert and oriented.  Eyes: No erythema or discharge   Respiratory: Breathing comfortably on room air. No wheezing or distress.   Musculoskeletal: Full ROM of the bilateral upper extremities.   Skin: No visible concerning skin rashes or lesions   Neuro: No notable tremor and dyskinetic movements.   Psych: Mood and affect appear normal.     The rest of a comprehensive physical examination is deferred due to PHE (public health emergency) video visit  restrictions.     Laboratory/Imaging Studies   6/9/2021 PET/CT:  - No hypermetabolic disease in the chest, abdomen, or pelvis.  - Mild FDG uptake in the region of the left breast is below background c/w complete response.  - Scattered mixed lytic and sclerotic osseous foci, likely representing healed metastatic disease.  - Left greater than right perihilar GGO.  - Kyphoplasty changes to L4.    7/19/2021 Labs:  WBC 2.7  ANC 1.4    CA27.29  59---63---61---58    ASSESSMENT/PLAN:   45 year old female with history of DVT with left breast invasive ductal carcinoma, ER/MS positive, HER2 negative metastasized to bones.    1.  Metastatic breast cancer:  On treatment with Ibrance, zoladex, and anastrozole.  Previously referred for oophorectomies; she has not yet had this performed and remains interested in having it done.  Will therefore place a referral to gyn/onc.  We reviewed imaging last month showed good response to treatment.  Her tumor markers remain stable and she is asymptomatic of disease progression.  Will continue to monitor monthly tumor markers.  Plan to repeat imaging in 12/2021, sooner if symptomatic of disease progression or increasing trend in tumor markers.    2.  Right chest wall pain:  I personally reviewed images from 6/9/2021 CT scan and there is no correlating metastatic lesion in this area.  May be radiculopathy from T7 lesion.  Recommend increasing gabapentin dosing to 600 mg PO TID.  Continue oxycodone, methocarbamol, lidocaine patches, and heat prn.  Ongoing follow up with palliative medicine.    3.  Bone metastases:  She is s/p XRT to the lumbar spine and kyphoplasty of L4.  She is taking MS contin 30 mg PO BID and oxycodone prn.  On Zometa since 1/18/2021 and is receiving once every 3 months.  Was due to receive Zometa today, however, will hold given dental abscesses.    4.  Dental abscesses:  We reviewed the immunosuppresive nature of her treatment and associated increase risk in infections.   Referral to dentistry placed today.    5.  DVT:  Recommend continuing Xarelto until contraindicated given metastatic disease.       6.  Follow Up:  Gyn/onc and dental visits within 2-3 weeks.  Zoladex in 4 weeks.  Labs, in person visit with Alee and zoladex in 8 weeks.  Labs, zoladex, and zometa in 12 weeks.  Labs, in person visit with me, and zoladex in 16 weeks.

## 2021-07-19 NOTE — PROGRESS NOTES
"Infusion Nursing Note:  Teresa Sanderson presents today for zoladex, (zometa HELD today).    Patient seen by provider today: No   present during visit today: Not Applicable.    Note:   -Patient scheduled for zometa today. She has two crowns that fell out several months ago and she hasn't gotten them fixed/replaced yet on her two front teeth. States she doesn't know where to go to the dentist. She also reports that she has been getting \"abscesses\" on her gums every couple of weeks for the past few months.  She takes pain meds and swishes with mouthwash and they go away.    -Patient reports ongoing hot flashes from her zoladex injections.   -Patient has pain 7/10 today in her back. It's ongoing. She is taking ms contin and oxycodone as prescribed by palliative care. No interventions for pain needed during today's infusion visit.  -Patient reports that she has had very little appetite for the past 1 month. She doesn't feel like eating anything.  She takes one bite of her food and doesn't eat anything else. Weight is down today. Denies nausea. Reports she is drinking oral fluids.  -Patient reports some exertional pain in her chest intermittently. States she gets pain rated 4-5/10 when she exerts herself. Pain only lasts for about a minute until she rests.  She reports that she is still smoking but trying to cut down.      Dr Plunkett updated on all of the above assessment findings and will discuss with patient at her appointment tomorrow.    7/19/2021 1335 TORB Jahaira Plunkett MD/Lili AlvaradoRN  -hold zometa infusion today     Intravenous Access:  Peripheral IV placed in lab    Treatment Conditions:  Lab Results   Component Value Date     07/19/2021     06/23/2021                   Lab Results   Component Value Date    POTASSIUM 3.8 07/19/2021    POTASSIUM 3.2 06/23/2021           No results found for: MAG         Lab Results   Component Value Date    CR 1.07 07/19/2021    CR 0.80 06/23/2021     "               Lab Results   Component Value Date    NANDO 8.8 07/19/2021    NANDO 9.1 06/23/2021                Lab Results   Component Value Date    BILITOTAL 0.2 07/19/2021    BILITOTAL 0.3 06/23/2021           Lab Results   Component Value Date    ALBUMIN 3.3 07/19/2021    ALBUMIN 3.5 06/23/2021                    Lab Results   Component Value Date    ALT 16 07/19/2021    ALT 20 06/23/2021           Lab Results   Component Value Date    AST 10 07/19/2021    AST 17 06/23/2021       Results reviewed, labs MET treatment parameters for zometa      Post Infusion Assessment:  Patient tolerated injection without incident. Zoladex injection given subcutaneously into LEFT abdomen. Patient iced prior to the injection   Access discontinued per protocol.       Discharge Plan:   Patient declined prescription refills.  Discharge instructions reviewed with: Patient.  Patient and/or family verbalized understanding of discharge instructions and all questions answered.  AVS to patient via [employer].  Patient will return tomorrow for next appointment.   Patient discharged in stable condition accompanied by: self.  Departure Mode: Ambulatory.  Face to Face time: 5 minutes      Lili Alvarado RN

## 2021-07-20 ENCOUNTER — VIRTUAL VISIT (OUTPATIENT)
Dept: ONCOLOGY | Facility: CLINIC | Age: 46
End: 2021-07-20
Attending: INTERNAL MEDICINE
Payer: COMMERCIAL

## 2021-07-20 DIAGNOSIS — C79.51 SPINE METASTASIS: ICD-10-CM

## 2021-07-20 DIAGNOSIS — C50.919 MALIGNANT NEOPLASM OF FEMALE BREAST, UNSPECIFIED ESTROGEN RECEPTOR STATUS, UNSPECIFIED LATERALITY, UNSPECIFIED SITE OF BREAST (H): ICD-10-CM

## 2021-07-20 DIAGNOSIS — M79.661 PAIN OF RIGHT LOWER LEG: ICD-10-CM

## 2021-07-20 DIAGNOSIS — C50.812 MALIGNANT NEOPLASM OF OVERLAPPING SITES OF LEFT BREAST IN FEMALE, ESTROGEN RECEPTOR POSITIVE (H): ICD-10-CM

## 2021-07-20 DIAGNOSIS — Z17.0 MALIGNANT NEOPLASM OF OVERLAPPING SITES OF LEFT BREAST IN FEMALE, ESTROGEN RECEPTOR POSITIVE (H): ICD-10-CM

## 2021-07-20 DIAGNOSIS — C79.51 CARCINOMA OF LEFT BREAST METASTATIC TO BONE (H): Primary | ICD-10-CM

## 2021-07-20 DIAGNOSIS — C50.912 CARCINOMA OF LEFT BREAST METASTATIC TO BONE (H): Primary | ICD-10-CM

## 2021-07-20 DIAGNOSIS — N63.0 BREAST MASS: ICD-10-CM

## 2021-07-20 DIAGNOSIS — C79.51 METASTASIS TO BONE (H): ICD-10-CM

## 2021-07-20 DIAGNOSIS — K04.7 DENTAL ABSCESS: ICD-10-CM

## 2021-07-20 PROCEDURE — 999N001193 HC VIDEO/TELEPHONE VISIT; NO CHARGE

## 2021-07-20 PROCEDURE — 99215 OFFICE O/P EST HI 40 MIN: CPT | Mod: 95 | Performed by: INTERNAL MEDICINE

## 2021-07-20 RX ORDER — GABAPENTIN 300 MG/1
CAPSULE ORAL
Qty: 180 CAPSULE | Refills: 0 | Status: SHIPPED | OUTPATIENT
Start: 2021-07-20 | End: 2021-10-06

## 2021-07-20 NOTE — LETTER
7/20/2021         RE: Teresa Sanderson  [address] [facility] [address] [address]        Dear Colleague,    Thank you for referring your patient, Teresa Sanderson, to the [facility]. Please see a copy of my visit note below.    Teresa is a 45 year old who is being evaluated via a billable video visit.      How would you like to obtain your AVS? MyChart  If the video visit is dropped, the invitation should be resent by: Text to cell phone: [phone]  Will anyone else be joining your video visit? No    Vitals - Patient Reported  Weight (Patient Reported): 111.5 kg (245 lb 13 oz)  Height (Patient Reported): 182.9 cm (6')  BMI (Based on Pt Reported Ht/Wt): 33.34  Pain Score: Moderate Pain (5)  Pain Loc: Chest    Video-Visit Details    Type of service:  Video Visit    35 minutes spent on the date of the encounter doing chart review, review of test results, interpretation of tests, patient visit and documentation       Originating Location (pt. Location): Home    Distant Location (provider location):  [facility]     Platform used for Video Visit: Chad De Leon MA    Oncology Visit:   Date on this visit: 7/20/2021    Diagnosis:  ER positive left breast cancer metastasized to bones.     Primary Physician: No Ref-Primary, Physician     History Of Present Illness:    Ms. Sanderson is a 45 year old female with a h/o tobacco abuse and DVTs with left breast cancer metastasized to bone. She presented to [facility] ED with back pain on 12/5/2020. MRI of the L-spine showed an abnormal L4 lesion with associated right paraspinal mass, abnormalities in L5 and the left iliac bone were also seen. CT C/A/P showed a left breast mass, lytic lesions of T7, L4, and the pelvis, and a 3 cm lesion in the kidney (thought to be a cyst). Ultrasound of the bilateral lower extremities showed a non-occlusive thrombus in the left popliteal vein. Mammogram and ultrasound of  the bilateral breasts on 12/17/2020 showed a spiculated mass measuring at least 7.8 cm at 12-1:00 left breast extending from the nipple to 9 cm from the nipple with associated nipple retraction. This mass was biopsied, and showed IDC with surrounding DCIS, grade 3, ER+ 90%, and FL+ 75%.  HER2 was equivocal in approximately 35% of tumor cells by FISH and was negative by IHC.    Metastatic Breast Cancer Treatment:  12/23/2021 - 1/7/2021  Radiation (3000 cGy) to the lumbar spine.  1/29/2021 - present  Ibrance, zoladex, and anastrozole.  5/17/2021 radiofrequency ablation, kyphoplasty to L4    Interval History:  Ms. Sanderson was seen via video visit today for metastatic breast cancer follow-up.  She continues on treatment with Ibrance, Zoladex, and anastrozole.  Overall she is tolerating this regimen well.  Most bothersome to her at this time is right upper extremity/right chest wall and low back pain.  She reports the right arm and lateral chest wall pain has been present for 2 weeks.  It is a constant pain that is worse with stretching type movements.  She also reports the pain is worse if she takes a deep breath.  Her low back pain is chronic and is unchanged from previous.  She had some relief after kyphoplasty, however the time that it helped was pretty short.  She is taking pain medicine 3 times a day.  When her medicine runs out, her pain is quite bad and the pain medicine is the only thing that controls her pain.  She is also using lidocaine patches and heat packs.  She has not tried massage as she is afraid that it could make the pain worse.  She continues to work with physical therapy and as such has been more active.  She denies other new bone or joint aches or pains.  She has no current cough, shortness of breath, or chest pain.  She has no current abdominal complaints.  She denies infectious complaints with the exception of abscesses in her mouth.  She states that these have been present for a few weeks.   She admits that she has very poor dentition and does not know where she can see a dentist.  The remainder of a complete 12 point review of systems is reviewed with patient was negative with exception that mentioned above.    Past Medical/Surgical History:   Past Medical History:   Diagnosis Date     Anxiety      Breast CA (H) 12/2020     Depression      DVT (deep venous thrombosis) (H) 2014     Left breast mass     x approximately 4-5 months     Pulmonary emboli (H)      Pyelonephritis      Schizoaffective disorder (H)      Tobacco use      Past Surgical History:   Procedure Laterality Date     IR LUMBAR KYPHOPLASTY VERTEBRAE  5/17/2021     TUBAL LIGATION  1998        Allergies   Allergen Reactions     Contrast Dye      Pt developed nausea after isovue 370 injection on 6/9/21        Current Outpatient Medications   Medication     anastrozole (ARIMIDEX) 1 MG tablet     doxepin (SILENOR) 6 MG tablet     gabapentin (NEURONTIN) 300 MG capsule     methocarbamol (ROBAXIN) 500 MG tablet     morphine (MS CONTIN) 15 MG CR tablet     naloxone (NARCAN) 4 MG/0.1ML nasal spray     ondansetron (ZOFRAN-ODT) 4 MG ODT tab     oxyCODONE (ROXICODONE) 5 MG tablet     palbociclib (IBRANCE) 125 MG tablet     pantoprazole (PROTONIX) 40 MG EC tablet     polyethylene glycol (MIRALAX) 17 GM/Dose powder     prochlorperazine (COMPAZINE) 10 MG tablet     QUEtiapine (SEROQUEL) 100 MG tablet     rivaroxaban ANTICOAGULANT (XARELTO) 20 MG TABS tablet     SENNA-docusate sodium (SENNA S) 8.6-50 MG tablet     sertraline (ZOLOFT) 50 MG tablet     No current facility-administered medications for this visit.   '    Family and Social History:   Please see initial consultation dated 12/22/2020 for further details.    Physical Exam:   General: Adult female in NAD.  Alert and oriented.  Eyes: No erythema or discharge   Respiratory: Breathing comfortably on room air. No wheezing or distress.   Musculoskeletal: Full ROM of the bilateral upper extremities.    Skin: No visible concerning skin rashes or lesions   Neuro: No notable tremor and dyskinetic movements.   Psych: Mood and affect appear normal.     The rest of a comprehensive physical examination is deferred due to PHE (public health emergency) video visit restrictions.     Laboratory/Imaging Studies   6/9/2021 PET/CT:  - No hypermetabolic disease in the chest, abdomen, or pelvis.  - Mild FDG uptake in the region of the left breast is below background c/w complete response.  - Scattered mixed lytic and sclerotic osseous foci, likely representing healed metastatic disease.  - Left greater than right perihilar GGO.  - Kyphoplasty changes to L4.    7/19/2021 Labs:  WBC 2.7  ANC 1.4    CA27.29  59---63---61---58    ASSESSMENT/PLAN:   45 year old female with history of DVT with left breast invasive ductal carcinoma, ER/KS positive, HER2 negative metastasized to bones.    1.  Metastatic breast cancer:  On treatment with Ibrance, zoladex, and anastrozole.  Previously referred for oophorectomies; she has not yet had this performed and remains interested in having it done.  Will therefore place a referral to gyn/onc.  We reviewed imaging last month showed good response to treatment.  Her tumor markers remain stable and she is asymptomatic of disease progression.  Will continue to monitor monthly tumor markers.  Plan to repeat imaging in 12/2021, sooner if symptomatic of disease progression or increasing trend in tumor markers.    2.  Right chest wall pain:  I personally reviewed images from 6/9/2021 CT scan and there is no correlating metastatic lesion in this area.  May be radiculopathy from T7 lesion.  Recommend increasing gabapentin dosing to 600 mg PO TID.  Continue oxycodone, methocarbamol, lidocaine patches, and heat prn.  Ongoing follow up with palliative medicine.    3.  Bone metastases:  She is s/p XRT to the lumbar spine and kyphoplasty of L4.  She is taking MS contin 30 mg PO BID and oxycodone prn.  On Zometa  since 1/18/2021 and is receiving once every 3 months.  Was due to receive Zometa today, however, will hold given dental abscesses.    4.  Dental abscesses:  We reviewed the immunosuppresive nature of her treatment and associated increase risk in infections.  Referral to dentistry placed today.    5.  DVT:  Recommend continuing Xarelto until contraindicated given metastatic disease.       6.  Follow Up:  Gyn/onc and dental visits within 2-3 weeks.  Zoladex in 4 weeks.  Labs, in person visit with Alee and zoladex in 8 weeks.  Labs, zoladex, and zometa in 12 weeks.  Labs, in person visit with me, and zoladex in 16 weeks.          Again, thank you for allowing me to participate in the care of your patient.        Sincerely,        Jahaira Plunkett MD

## 2021-07-22 NOTE — TELEPHONE ENCOUNTER
RECORDS STATUS - BREAST    RECORDS REQUESTED FROM: Epic   DATE REQUESTED: 7/22/21   NOTES DETAILS STATUS   OFFICE NOTE from referring provider [facility] Jahaira Plunkett MD in  ONCOLOGY ADULT: 7/20/21   OFFICE NOTE from medical oncologist [facility] 7/20/21   OFFICE NOTE from surgeon     OFFICE NOTE from radiation oncologist [facility] Dr. Tesha Kebede: 1/9/21   DISCHARGE SUMMARY from hospital [facility] 5/17/21, 3/21/21, 12/5/20   DISCHARGE REPORT from the ER [facility] 10/25/20   OPERATIVE REPORT Epic 5/17/21: ANESTHESIA OUT OF OR Radiofrequency Ablation Kyphoplasty   MEDICATION LIST [facility] 7/20/21   CLINICAL TRIAL TREATMENTS TO DATE     LABS     PATHOLOGY REPORTS  (Tissue diagnosis, Stage, ER/[place] percentage positive and intensity of staining, HER2 IHC, FISH, and all biopsies from breast and any distant metastasis)                 [facility] 12/17/20: HER 2 KVNG FISH/Surg Path  2/3/21: Surg Path   GENONOMIC TESTING     TYPE:   (Next Generation Sequencing, including Foundation One testing, and Oncotype score)     IMAGING (NEED IMAGES & REPORT)     CT SCANS PACS [facility]   MRI PACS [facility]   MAMMO PACS Epic   ULTRASOUND PACS Epic   PET PACS [facility]   BONE SCAN     BRAIN MRI PACS [facility]

## 2021-07-23 NOTE — ADDENDUM NOTE
Addendum  created 07/23/21 0659 by Caridad Maki MD    Attestation recorded in Intraprocedure, Clinical Note Signed, Intraprocedure Attestations filed

## 2021-07-23 NOTE — ANESTHESIA POSTPROCEDURE EVALUATION
Patient: Teresa Kin    Procedure(s):  ANESTHESIA OUT OF OR Radiofrequency Ablation Kyphoplasty @1200    Diagnosis:Carcinoma of left breast metastatic to bone (H) [C50.912, C79.51]  Diagnosis Additional Information: No value filed.    Anesthesia Type:  General    Note:  Disposition: Outpatient   Postop Pain Control: Uneventful            Sign Out: Well controlled pain   PONV:    Neuro/Psych: Uneventful            Sign Out: Acceptable/Baseline neuro status   Airway/Respiratory: Uneventful            Sign Out: Acceptable/Baseline resp. status   CV/Hemodynamics: Uneventful            Sign Out: Acceptable CV status; No obvious hypovolemia; No obvious fluid overload   Other NRE:    DID A NON-ROUTINE EVENT OCCUR?            Last vitals:  Vitals Value Taken Time   /69 05/17/21 2000   Temp 36.7  C (98.1  F) 05/17/21 2000   Pulse 67 05/17/21 2000   Resp 12 05/17/21 2000   SpO2 95 % 05/17/21 2000       Electronically Signed By: Caridad Maki MD  July 23, 2021  6:59 AM

## 2021-07-25 ENCOUNTER — MYC REFILL (OUTPATIENT)
Dept: PALLIATIVE CARE | Facility: CLINIC | Age: 46
End: 2021-07-25

## 2021-07-25 DIAGNOSIS — C50.912 CARCINOMA OF LEFT BREAST METASTATIC TO BONE (H): ICD-10-CM

## 2021-07-25 DIAGNOSIS — G89.3 CANCER ASSOCIATED PAIN: ICD-10-CM

## 2021-07-25 DIAGNOSIS — C79.51 CARCINOMA OF LEFT BREAST METASTATIC TO BONE (H): ICD-10-CM

## 2021-07-26 RX ORDER — OXYCODONE HYDROCHLORIDE 5 MG/1
5-10 TABLET ORAL
Qty: 56 TABLET | Refills: 0 | Status: SHIPPED | OUTPATIENT
Start: 2021-07-26 | End: 2021-08-13

## 2021-07-26 NOTE — TELEPHONE ENCOUNTER
Received [employer] message from patient requesting refill of oxycodone. Patient missed last scheduled visit. Called patient, explained refill policy. She accepted visit on 7/30 at 10:40 with Dr. Tellez.    Last refill: 6/23/21  Last office visit: 6/4/21  Scheduled for follow up 7/30/21 (message sent to scheduling team)     Will route request to MD for review.     Reviewed [place]  Report.

## 2021-07-28 DIAGNOSIS — C50.812 MALIGNANT NEOPLASM OF OVERLAPPING SITES OF LEFT BREAST IN FEMALE, ESTROGEN RECEPTOR POSITIVE (H): ICD-10-CM

## 2021-07-28 DIAGNOSIS — Z17.0 MALIGNANT NEOPLASM OF OVERLAPPING SITES OF LEFT BREAST IN FEMALE, ESTROGEN RECEPTOR POSITIVE (H): ICD-10-CM

## 2021-07-28 DIAGNOSIS — C79.51 CARCINOMA OF LEFT BREAST METASTATIC TO BONE (H): Primary | ICD-10-CM

## 2021-07-28 DIAGNOSIS — C50.912 CARCINOMA OF LEFT BREAST METASTATIC TO BONE (H): Primary | ICD-10-CM

## 2021-07-29 DIAGNOSIS — Z17.0 MALIGNANT NEOPLASM OF OVERLAPPING SITES OF LEFT BREAST IN FEMALE, ESTROGEN RECEPTOR POSITIVE (H): ICD-10-CM

## 2021-07-29 DIAGNOSIS — C50.912 CARCINOMA OF LEFT BREAST METASTATIC TO BONE (H): Primary | ICD-10-CM

## 2021-07-29 DIAGNOSIS — C79.51 CARCINOMA OF LEFT BREAST METASTATIC TO BONE (H): Primary | ICD-10-CM

## 2021-07-29 DIAGNOSIS — C50.812 MALIGNANT NEOPLASM OF OVERLAPPING SITES OF LEFT BREAST IN FEMALE, ESTROGEN RECEPTOR POSITIVE (H): ICD-10-CM

## 2021-07-29 RX ORDER — ANASTROZOLE 1 MG/1
1 TABLET ORAL DAILY
Qty: 28 TABLET | Refills: 0 | Status: SHIPPED | OUTPATIENT
Start: 2021-08-05 | End: 2021-09-16

## 2021-07-30 ENCOUNTER — PATIENT OUTREACH (OUTPATIENT)
Dept: PALLIATIVE CARE | Facility: CLINIC | Age: 46
End: 2021-07-30

## 2021-07-30 DIAGNOSIS — C79.51 CARCINOMA OF LEFT BREAST METASTATIC TO BONE (H): ICD-10-CM

## 2021-07-30 DIAGNOSIS — C50.912 CARCINOMA OF LEFT BREAST METASTATIC TO BONE (H): ICD-10-CM

## 2021-07-30 DIAGNOSIS — C79.51 SPINE METASTASIS: ICD-10-CM

## 2021-07-30 DIAGNOSIS — C50.919 MALIGNANT NEOPLASM OF FEMALE BREAST, UNSPECIFIED ESTROGEN RECEPTOR STATUS, UNSPECIFIED LATERALITY, UNSPECIFIED SITE OF BREAST (H): ICD-10-CM

## 2021-07-30 DIAGNOSIS — Z17.0 MALIGNANT NEOPLASM OF OVERLAPPING SITES OF LEFT BREAST IN FEMALE, ESTROGEN RECEPTOR POSITIVE (H): ICD-10-CM

## 2021-07-30 DIAGNOSIS — N63.0 BREAST MASS: ICD-10-CM

## 2021-07-30 DIAGNOSIS — C50.812 MALIGNANT NEOPLASM OF OVERLAPPING SITES OF LEFT BREAST IN FEMALE, ESTROGEN RECEPTOR POSITIVE (H): ICD-10-CM

## 2021-07-30 DIAGNOSIS — C79.51 METASTASIS TO BONE (H): ICD-10-CM

## 2021-07-30 DIAGNOSIS — M79.661 PAIN OF RIGHT LOWER LEG: ICD-10-CM

## 2021-08-02 RX ORDER — METHOCARBAMOL 500 MG/1
500 TABLET, FILM COATED ORAL 4 TIMES DAILY
Qty: 120 TABLET | Refills: 0 | Status: SHIPPED | OUTPATIENT
Start: 2021-08-02 | End: 2021-10-06

## 2021-08-04 NOTE — PROGRESS NOTES
"Gynecologic Oncology New Patient Consult    Referring provider:    Jahaira Plunkett MD  [address] [place] [address]   RE: Teresa Sanderson  : 1975  LILIANA: 2021      CC: metastatic breast cancer, referral for BSO from heme/onc    HPI: Ms Moore (\"leesa-iris\") Kin is a 45 year old year old female who presents for consultation. She is here today with her friend for support. She notes that she's doing ok, though does have daily back pain. Her mood is up and down but working with a psychologist. She is having trouble sleeping. Doesn't have an appetite but is trying to eat. Denies nausea, vomiting. She does has constipation though takes a laxative. Denies vaginal bleeding and discharge.  Last period was in November before her diagnosis.    Last period was in November, no bleeding since then. Historically she had 3-4d cycles, monthly. No trouble with heavy bleeding    She is next due for chemo (was supposed to get it last week, but having trouble with her teeth). Hasn't gone to dentist yet as recommended. Next infusion scheduled for     Treatment History:    Ms. Sanderson is a 45 year old female with a h/o tobacco abuse and DVTs with left breast cancer metastasized to bone. She presented to [place] ED with back pain on 2020. MRI of the L-spine showed an abnormal L4 lesion with associated right paraspinal mass, abnormalities in L5 and the left iliac bone were also seen. CT C/A/P showed a left breast mass, lytic lesions of T7, L4, and the pelvis, and a 3 cm lesion in the kidney (thought to be a cyst). Ultrasound of the bilateral lower extremities showed a non-occlusive thrombus in the left popliteal vein. Mammogram and ultrasound of the bilateral breasts on 2020 showed a spiculated mass measuring at least 7.8 cm at 12-1:00 left breast extending from the nipple to 9 cm from the nipple with associated nipple retraction. This mass was biopsied, and showed IDC with " "surrounding DCIS, grade 3, ER+ 90%, and DE+ 75%.  HER2 was equivocal in approximately 35% of tumor cells by FISH and was negative by IHC.    Past Medical History:   Diagnosis Date     Anxiety      Breast CA (H) 2020     Depression      DVT (deep venous thrombosis) (H)      Left breast mass     x approximately 4-5 months     Pulmonary emboli (H)      Pyelonephritis      Schizoaffective disorder (H)      Tobacco use        Past Surgical History:   Procedure Laterality Date     IR LUMBAR KYPHOPLASTY VERTEBRAE  2021     TUBAL LIGATION          OBGYN history and Health Maintenance (Personally reviewed 2021):  , s/p 5 vaginal deliveries. Youngest is 23, oldest in 28. Tubal ligation 6 months after youngest child  Last Pap Smear: 2/3/2021 NIL (transformation zone negative), HPV neg.  no history abnl that she knows of  Last Mammogram: previously had mammograms and US, though never a biopsy until 2020 with diagnosis  Last Colonoscopy:  Never had a colonoscopy     Medications and allergies reviewed in EPIC    Social History:  Social History     Tobacco Use     Smoking status: Current Every Day Smoker     Packs/day: 0.25     Types: Cigarettes     Start date: 2011     Smokeless tobacco: Never Used   Substance Use Topics     Alcohol use: Not Currently     Comment: occ   Trying to cut back - smokes about 4-5 cigarettes a day  Occasional glass of wine  Marijuana use for pain - doesn't help     Work: not currently working  Ethnicity identification:   Preferred language: English  Lives at home with: Lives with cousin, he is supportive. Has three boys and two girls. Daughter Peyman (\"Ta-juan-ia\") Yan (23yo) helps with all her daily needs  Lives in [place]    Family History:   The patient's family history is notable for   Father's side   - first cousin  of brain cancer in her 50s  - father had stomach cancer, spread to lungs,  in   - aunties with breast " "cancer  - three cousins all  in the same month    Mother's side  - lung cancer    Physical Exam:     BP (!) 153/102   Pulse 66   Temp 97.6  F (36.4  C)   Resp 16   Ht 1.829 m (6' 0.01\")   Wt 114.8 kg (253 lb)   SpO2 99%   BMI 34.31 kg/m    Body mass index is 34.31 kg/m .    General: Alert and oriented, no acute distress  Psych: Mood stable. Linear speech, appropriate affect  GI: No distention. No masses. No hernia.   Lymph: No enlarge lymph nodes in neck or groin  : deferred    PET 21:                                                                   IMPRESSION: In this patient with history of stage IV invasive breast  cancer, metastatic to bone and left paraspinal musculature:  1.  No hypermetabolic disease in the chest, abdomen, or pelvis.   2.  Mild FDG uptake in the region of the left breast is below  background, consistent with complete response.  3.  Scattered mixed lytic and sclerotic osseous foci, likely  representing healed metastatic disease.  4.  Left greater than right perihilar groundglass opacities, could  represent small airway versus small vessel inflammation, less likely  infection.  5.  Mild focal radiotracer uptake at the GE junction, likely  represents sequela of gastroesophageal reflux.  6.  Kyphoplasty changes to L4 with associated vascular intravasation  of radiodense material extending into the IVC.     Dr. Santo Wiggins was contacted by Dr. Bardales at 2021 4:29 PM and  verbalized understanding of the L4 vertebroplasty and IVC findings.       I have personally reviewed the examination and initial interpretation  and I agree with the findings.     MALENA FARAH MD      Assessment:    Teresa Sanderson is a 45 year old woman with a history of VTE on chronic anticoagulation and recent diagnosis of metastatic breast cancer, referred by Heme/Onc for oophorectomy in order to eliminate the need for monthly Zoladex infusions. Discussed risks and benefits of surgery including " risk of bleeding, infection and injury to surrounding tissue. Discussed that patient would need to stop Xarelto prior to surgery. Also discussed surgical menopause, though patient already in chemical menopause with treatment.    Plan:     1.)   Metastatic Breast Cancer  - Laparoscopic BSO. Orders placed. Plan SEE-FIM since we do not know her BRCA status.   - Would need to stop Xarelto 48 hours prior to surgery  - Virtual PAC  OK       2.)Genetic risk factors were assessed: Needs genetic testing given young  Breast cancer.   -Recommend genetic counseling referral    3.) Poor dentition:   -Dental referral placed today, faxed and phone # given to patient        Total visit time today was 60 minutes, which included reviewing outside records,  discussing her case with med onc, face to face time with the patient,  Documentation, and ordering labs and procedures.       Arianna Keller MD    Department of Ob/Gyn and Women's Health  Division of Gynecologic Oncology  [facility]  Pager [phone]

## 2021-08-05 ENCOUNTER — PRE VISIT (OUTPATIENT)
Dept: ONCOLOGY | Facility: CLINIC | Age: 46
End: 2021-08-05

## 2021-08-05 ENCOUNTER — ONCOLOGY VISIT (OUTPATIENT)
Dept: ONCOLOGY | Facility: CLINIC | Age: 46
End: 2021-08-05
Attending: INTERNAL MEDICINE
Payer: COMMERCIAL

## 2021-08-05 VITALS
HEIGHT: 72 IN | RESPIRATION RATE: 16 BRPM | HEART RATE: 66 BPM | SYSTOLIC BLOOD PRESSURE: 153 MMHG | BODY MASS INDEX: 34.27 KG/M2 | WEIGHT: 253 LBS | OXYGEN SATURATION: 99 % | TEMPERATURE: 97.6 F | DIASTOLIC BLOOD PRESSURE: 102 MMHG

## 2021-08-05 DIAGNOSIS — C79.51 CARCINOMA OF LEFT BREAST METASTATIC TO BONE (H): ICD-10-CM

## 2021-08-05 DIAGNOSIS — C50.912 CARCINOMA OF LEFT BREAST METASTATIC TO BONE (H): ICD-10-CM

## 2021-08-05 PROCEDURE — G0463 HOSPITAL OUTPT CLINIC VISIT: HCPCS

## 2021-08-05 PROCEDURE — 99205 OFFICE O/P NEW HI 60 MIN: CPT | Performed by: OBSTETRICS & GYNECOLOGY

## 2021-08-05 RX ORDER — CEFAZOLIN SODIUM 2 G/50ML
2 SOLUTION INTRAVENOUS SEE ADMIN INSTRUCTIONS
Status: CANCELLED | OUTPATIENT
Start: 2021-08-05

## 2021-08-05 RX ORDER — CEFAZOLIN SODIUM 2 G/50ML
2 SOLUTION INTRAVENOUS
Status: CANCELLED | OUTPATIENT
Start: 2021-08-05

## 2021-08-05 ASSESSMENT — MIFFLIN-ST. JEOR: SCORE: 1904.73

## 2021-08-05 ASSESSMENT — PAIN SCALES - GENERAL: PAINLEVEL: WORST PAIN (10)

## 2021-08-05 NOTE — NURSING NOTE
"Oncology Rooming Note    August 5, 2021 11:56 AM   Teresa Sanderson is a 45 year old female who presents for:    Chief Complaint   Patient presents with     Oncology Clinic Visit     Carcinoma of left breast metastatic to bone      Initial Vitals: BP (!) 153/102   Pulse 66   Temp 97.6  F (36.4  C)   Resp 16   Ht 1.829 m (6' 0.01\")   Wt 114.8 kg (253 lb)   SpO2 99%   BMI 34.31 kg/m   Estimated body mass index is 34.31 kg/m  as calculated from the following:    Height as of this encounter: 1.829 m (6' 0.01\").    Weight as of this encounter: 114.8 kg (253 lb). Body surface area is 2.42 meters squared.  Worst Pain (10) Comment: Data Unavailable   No LMP recorded. Patient has had an injection.  Allergies reviewed: Yes  Medications reviewed: Yes    Medications: Medication refills not needed today.  Pharmacy name entered into EPIC:    [facility] [place], [place] - [id number]  [facility] [place], [place] - [address] [facility] [place], [place] - [address] [id number] [place], [place] - [address] [facility] [address]   [facility] - [place], [place] - [address] [facility] [address]  [facility][id number] [place], [place] - [address] AT [facility] [address] [facility] [address]  [facility] [place], [place] - [address]  [facility] - [place], [place] - [address]     Clinical concerns: New patient        Naga Rivas MA            "

## 2021-08-05 NOTE — LETTER
"    2021         RE: Teresa Sanderson  [address] [facility] [address]        Dear Colleague,    Thank you for referring your patient, Teresa Sanderson, to the [facility]. Please see a copy of my visit note below.    Gynecologic Oncology New Patient Consult    Referring provider:    Jahaira Plunkett MD  [address] [place] [address]   RE: Teresa Sanderson  : 1975  LILIANA: 2021      CC: metastatic breast cancer, referral for BSO from heme/onc    HPI: Ms Moore (\"clee-iris\") Kin is a 45 year old year old female who presents for consultation. She is here today with her friend for support. She notes that she's doing ok, though does have daily back pain. Her mood is up and down but working with a psychologist. She is having trouble sleeping. Doesn't have an appetite but is trying to eat. Denies nausea, vomiting. She does has constipation though takes a laxative. Denies vaginal bleeding and discharge.  Last period was in November before her diagnosis.    Last period was in November, no bleeding since then. Historically she had 3-4d cycles, monthly. No trouble with heavy bleeding    She is next due for chemo (was supposed to get it last week, but having trouble with her teeth). Hasn't gone to dentist yet as recommended. Next infusion scheduled for     Treatment History:    Ms. Sanderson is a 45 year old female with a h/o tobacco abuse and DVTs with left breast cancer metastasized to bone. She presented to [place] ED with back pain on 2020. MRI of the L-spine showed an abnormal L4 lesion with associated right paraspinal mass, abnormalities in L5 and the left iliac bone were also seen. CT C/A/P showed a left breast mass, lytic lesions of T7, L4, and the pelvis, and a 3 cm lesion in the kidney (thought to be a cyst). Ultrasound of the bilateral lower extremities showed a non-occlusive thrombus in the left popliteal vein. " "Mammogram and ultrasound of the bilateral breasts on 2020 showed a spiculated mass measuring at least 7.8 cm at 12-1:00 left breast extending from the nipple to 9 cm from the nipple with associated nipple retraction. This mass was biopsied, and showed IDC with surrounding DCIS, grade 3, ER+ 90%, and LA+ 75%.  HER2 was equivocal in approximately 35% of tumor cells by FISH and was negative by IHC.    Past Medical History:   Diagnosis Date     Anxiety      Breast CA (H) 2020     Depression      DVT (deep venous thrombosis) (H)      Left breast mass     x approximately 4-5 months     Pulmonary emboli (H)      Pyelonephritis      Schizoaffective disorder (H)      Tobacco use        Past Surgical History:   Procedure Laterality Date     IR LUMBAR KYPHOPLASTY VERTEBRAE  2021     TUBAL LIGATION          OBGYN history and Health Maintenance (Personally reviewed 2021):  , s/p 5 vaginal deliveries. Youngest is 23, oldest in 28. Tubal ligation 6 months after youngest child  Last Pap Smear: 2/3/2021 NIL (transformation zone negative), HPV neg.  no history abnl that she knows of  Last Mammogram: previously had mammograms and US, though never a biopsy until 2020 with diagnosis  Last Colonoscopy:  Never had a colonoscopy     Medications and allergies reviewed in EPIC    Social History:  Social History     Tobacco Use     Smoking status: Current Every Day Smoker     Packs/day: 0.25     Types: Cigarettes     Start date: 2011     Smokeless tobacco: Never Used   Substance Use Topics     Alcohol use: Not Currently     Comment: occ   Trying to cut back - smokes about 4-5 cigarettes a day  Occasional glass of wine  Marijuana use for pain - doesn't help     Work: not currently working  Ethnicity identification:   Preferred language: English  Lives at home with: Lives with cousin, he is supportive. Has three boys and two girls. Daughter Peyman (\"Ta-keasia-ia\") Yan (23yo) helps with " "all her daily needs  L[facility] in [facility]    Family History:   The patient's family history is notable for   Father's side   - first cousin  of brain cancer in her 50s  - father had stomach cancer, spread to lungs,  in   - aunties with breast cancer  - three cousins all  in the same month    Mother's side  - lung cancer    Physical Exam:     BP (!) 153/102   Pulse 66   Temp 97.6  F (36.4  C)   Resp 16   Ht 1.829 m (6' 0.01\")   Wt 114.8 kg (253 lb)   SpO2 99%   BMI 34.31 kg/m    Body mass index is 34.31 kg/m .    General: Alert and oriented, no acute distress  Psych: Mood stable. Linear speech, appropriate affect  GI: No distention. No masses. No hernia.   Lymph: No enlarge lymph nodes in neck or groin  : deferred    PET 21:                                                                   IMPRESSION: In this patient with history of stage IV invasive breast  cancer, metastatic to bone and left paraspinal musculature:  1.  No hypermetabolic disease in the chest, abdomen, or pelvis.   2.  Mild FDG uptake in the region of the left breast is below  background, consistent with complete response.  3.  Scattered mixed lytic and sclerotic osseous foci, likely  representing healed metastatic disease.  4.  Left greater than right perihilar groundglass opacities, could  represent small airway versus small vessel inflammation, less likely  infection.  5.  Mild focal radiotracer uptake at the GE junction, likely  represents sequela of gastroesophageal reflux.  6.  Kyphoplasty changes to L4 with associated vascular intravasation  of radiodense material extending into the IVC.     Dr. Santo Wiggins was contacted by Dr. Bardales at 2021 4:29 PM and  verbalized understanding of the L4 vertebroplasty and IVC findings.       I have personally reviewed the examination and initial interpretation  and I agree with the findings.     MALENA FARAH MD      Assessment:    Teresa Sanderson is a 45 " year old woman with a history of VTE on chronic anticoagulation and recent diagnosis of metastatic breast cancer, referred by Heme/Onc for oophorectomy in order to eliminate the need for monthly Zoladex infusions. Discussed risks and benefits of surgery including risk of bleeding, infection and injury to surrounding tissue. Discussed that patient would need to stop Xarelto prior to surgery. Also discussed surgical menopause, though patient already in chemical menopause with treatment.    Plan:     1.)   Metastatic Breast Cancer  - Laparoscopic BSO. Orders placed. Plan SEE-FIM since we do not know her BRCA status.   - Would need to stop Xarelto 48 hours prior to surgery  - Virtual PAC  OK       2.)Genetic risk factors were assessed: Needs genetic testing given young  Breast cancer.   -Recommend genetic counseling referral    3.) Poor dentition:   -Dental referral placed today, faxed and phone # given to patient        Total visit time today was 60 minutes, which included reviewing outside records,  discussing her case with med onc, face to face time with the patient,  Documentation, and ordering labs and procedures.       Arianna Keller MD    Department of Ob/Gyn and Women's Health  Division of Gynecologic Oncology  [facility]  Pager [phone]

## 2021-08-09 ENCOUNTER — TELEPHONE (OUTPATIENT)
Dept: ONCOLOGY | Facility: CLINIC | Age: 46
End: 2021-08-09

## 2021-08-09 DIAGNOSIS — Z11.59 ENCOUNTER FOR SCREENING FOR OTHER VIRAL DISEASES: ICD-10-CM

## 2021-08-09 NOTE — TELEPHONE ENCOUNTER
----- Message from Macarena Spivey RN sent at 8/5/2021  1:15 PM CDT -----  Krishna   Laparoscopic BSO at [facility]  Dates are on the order   PAC virtual   Virtual post op     Please call patient    THANKS    Nelson

## 2021-08-09 NOTE — TELEPHONE ENCOUNTER
RNCC: Mikki Gallego    Surgery is scheduled with Dr. Keller on 8/20 at the [facility] ([facility] .  Scheduled per MD/patient.    H&P: to be completed by PAC - virtual visit 8/17    COVID-19 test: 8/16 at [facility], lab     Post-op: 9/15 as a virtual visit .    The RN completed the education regarding the surgery.     Patient will receive surgery arrival and start time from PAC.    The surgery packet was sent via US mail. and sent via [employer].    Patient will complete COVID-19 test that was scheduled by surgical coordinator 2-4 days prior to surgery.     I called the patient and was able to confirm the scheduled information above.

## 2021-08-10 ENCOUNTER — MYC MEDICAL ADVICE (OUTPATIENT)
Dept: ONCOLOGY | Facility: CLINIC | Age: 46
End: 2021-08-10

## 2021-08-10 ENCOUNTER — APPOINTMENT (OUTPATIENT)
Dept: GENERAL RADIOLOGY | Facility: CLINIC | Age: 46
End: 2021-08-10
Attending: EMERGENCY MEDICINE
Payer: COMMERCIAL

## 2021-08-10 ENCOUNTER — APPOINTMENT (OUTPATIENT)
Dept: CT IMAGING | Facility: CLINIC | Age: 46
End: 2021-08-10
Attending: EMERGENCY MEDICINE
Payer: COMMERCIAL

## 2021-08-10 ENCOUNTER — HOSPITAL ENCOUNTER (EMERGENCY)
Facility: CLINIC | Age: 46
Discharge: HOME | End: 2021-08-10
Attending: EMERGENCY MEDICINE | Admitting: EMERGENCY MEDICINE
Payer: COMMERCIAL

## 2021-08-10 VITALS
BODY MASS INDEX: 34.27 KG/M2 | DIASTOLIC BLOOD PRESSURE: 91 MMHG | SYSTOLIC BLOOD PRESSURE: 144 MMHG | WEIGHT: 253 LBS | HEIGHT: 72 IN | OXYGEN SATURATION: 100 % | TEMPERATURE: 98.7 F | RESPIRATION RATE: 16 BRPM | HEART RATE: 58 BPM

## 2021-08-10 DIAGNOSIS — Z11.52 ENCOUNTER FOR SCREENING LABORATORY TESTING FOR SEVERE ACUTE RESPIRATORY SYNDROME CORONAVIRUS 2 (SARS-COV-2): ICD-10-CM

## 2021-08-10 DIAGNOSIS — R07.81 PLEURITIC CHEST PAIN: ICD-10-CM

## 2021-08-10 DIAGNOSIS — C50.919 METASTATIC BREAST CANCER: ICD-10-CM

## 2021-08-10 DIAGNOSIS — M54.50 MIDLINE LOW BACK PAIN WITHOUT SCIATICA, UNSPECIFIED CHRONICITY: ICD-10-CM

## 2021-08-10 LAB
ANION GAP SERPL CALCULATED.3IONS-SCNC: 8 MMOL/L (ref 3–14)
ATRIAL RATE - MUSE: 59 BPM
BASOPHILS # BLD AUTO: 0.1 10E3/UL (ref 0–0.2)
BASOPHILS NFR BLD AUTO: 1 %
BUN SERPL-MCNC: 13 MG/DL (ref 7–30)
CALCIUM SERPL-MCNC: 8.7 MG/DL (ref 8.5–10.1)
CHLORIDE BLD-SCNC: 107 MMOL/L (ref 94–109)
CO2 SERPL-SCNC: 25 MMOL/L (ref 20–32)
CREAT SERPL-MCNC: 0.77 MG/DL (ref 0.52–1.04)
DIASTOLIC BLOOD PRESSURE - MUSE: NORMAL MMHG
EOSINOPHIL # BLD AUTO: 0.1 10E3/UL (ref 0–0.7)
EOSINOPHIL NFR BLD AUTO: 1 %
ERYTHROCYTE [DISTWIDTH] IN BLOOD BY AUTOMATED COUNT: 16.2 % (ref 10–15)
GFR SERPL CREATININE-BSD FRML MDRD: >90 ML/MIN/1.73M2
GLUCOSE BLD-MCNC: 102 MG/DL (ref 70–99)
HCG SERPL QL: NEGATIVE
HCT VFR BLD AUTO: 32 % (ref 35–47)
HGB BLD-MCNC: 10.5 G/DL (ref 11.7–15.7)
IMM GRANULOCYTES # BLD: 0 10E3/UL
IMM GRANULOCYTES NFR BLD: 1 %
INTERPRETATION ECG - MUSE: NORMAL
LYMPHOCYTES # BLD AUTO: 1.3 10E3/UL (ref 0.8–5.3)
LYMPHOCYTES NFR BLD AUTO: 25 %
MCH RBC QN AUTO: 31.1 PG (ref 26.5–33)
MCHC RBC AUTO-ENTMCNC: 32.8 G/DL (ref 31.5–36.5)
MCV RBC AUTO: 95 FL (ref 78–100)
MONOCYTES # BLD AUTO: 0.7 10E3/UL (ref 0–1.3)
MONOCYTES NFR BLD AUTO: 14 %
NEUTROPHILS # BLD AUTO: 3 10E3/UL (ref 1.6–8.3)
NEUTROPHILS NFR BLD AUTO: 58 %
NRBC # BLD AUTO: 0 10E3/UL
NRBC BLD AUTO-RTO: 0 /100
P AXIS - MUSE: 37 DEGREES
PLATELET # BLD AUTO: 261 10E3/UL (ref 150–450)
POTASSIUM BLD-SCNC: 3.7 MMOL/L (ref 3.4–5.3)
PR INTERVAL - MUSE: 162 MS
QRS DURATION - MUSE: 84 MS
QT - MUSE: 404 MS
QTC - MUSE: 399 MS
R AXIS - MUSE: 3 DEGREES
RBC # BLD AUTO: 3.38 10E6/UL (ref 3.8–5.2)
SARS-COV-2 RNA RESP QL NAA+PROBE: NEGATIVE
SODIUM SERPL-SCNC: 140 MMOL/L (ref 133–144)
SYSTOLIC BLOOD PRESSURE - MUSE: NORMAL MMHG
T AXIS - MUSE: 33 DEGREES
TROPONIN I SERPL-MCNC: <0.015 UG/L (ref 0–0.04)
VENTRICULAR RATE- MUSE: 59 BPM
WBC # BLD AUTO: 5.2 10E3/UL (ref 4–11)

## 2021-08-10 PROCEDURE — 85025 COMPLETE CBC W/AUTO DIFF WBC: CPT | Performed by: EMERGENCY MEDICINE

## 2021-08-10 PROCEDURE — 84703 CHORIONIC GONADOTROPIN ASSAY: CPT | Performed by: EMERGENCY MEDICINE

## 2021-08-10 PROCEDURE — 71275 CT ANGIOGRAPHY CHEST: CPT | Mod: 26 | Performed by: RADIOLOGY

## 2021-08-10 PROCEDURE — 71275 CT ANGIOGRAPHY CHEST: CPT

## 2021-08-10 PROCEDURE — 99285 EMERGENCY DEPT VISIT HI MDM: CPT | Performed by: EMERGENCY MEDICINE

## 2021-08-10 PROCEDURE — 96374 THER/PROPH/DIAG INJ IV PUSH: CPT | Mod: 59

## 2021-08-10 PROCEDURE — 71045 X-RAY EXAM CHEST 1 VIEW: CPT

## 2021-08-10 PROCEDURE — 96376 TX/PRO/DX INJ SAME DRUG ADON: CPT

## 2021-08-10 PROCEDURE — 250N000011 HC RX IP 250 OP 636: Performed by: EMERGENCY MEDICINE

## 2021-08-10 PROCEDURE — 96375 TX/PRO/DX INJ NEW DRUG ADDON: CPT

## 2021-08-10 PROCEDURE — 80048 BASIC METABOLIC PNL TOTAL CA: CPT | Performed by: EMERGENCY MEDICINE

## 2021-08-10 PROCEDURE — C9803 HOPD COVID-19 SPEC COLLECT: HCPCS

## 2021-08-10 PROCEDURE — 36415 COLL VENOUS BLD VENIPUNCTURE: CPT | Performed by: EMERGENCY MEDICINE

## 2021-08-10 PROCEDURE — 84484 ASSAY OF TROPONIN QUANT: CPT | Performed by: EMERGENCY MEDICINE

## 2021-08-10 PROCEDURE — 93005 ELECTROCARDIOGRAM TRACING: CPT

## 2021-08-10 PROCEDURE — 99285 EMERGENCY DEPT VISIT HI MDM: CPT | Mod: 25

## 2021-08-10 PROCEDURE — 71045 X-RAY EXAM CHEST 1 VIEW: CPT | Mod: 26 | Performed by: RADIOLOGY

## 2021-08-10 PROCEDURE — U0003 INFECTIOUS AGENT DETECTION BY NUCLEIC ACID (DNA OR RNA); SEVERE ACUTE RESPIRATORY SYNDROME CORONAVIRUS 2 (SARS-COV-2) (CORONAVIRUS DISEASE [COVID-19]), AMPLIFIED PROBE TECHNIQUE, MAKING USE OF HIGH THROUGHPUT TECHNOLOGIES AS DESCRIBED BY CMS-2020-01-R: HCPCS | Performed by: EMERGENCY MEDICINE

## 2021-08-10 PROCEDURE — 250N000011 HC RX IP 250 OP 636

## 2021-08-10 RX ORDER — HYDROMORPHONE HYDROCHLORIDE 1 MG/ML
0.5 INJECTION, SOLUTION INTRAMUSCULAR; INTRAVENOUS; SUBCUTANEOUS ONCE
Status: COMPLETED | OUTPATIENT
Start: 2021-08-10 | End: 2021-08-10

## 2021-08-10 RX ORDER — IOPAMIDOL 755 MG/ML
74 INJECTION, SOLUTION INTRAVASCULAR ONCE
Status: COMPLETED | OUTPATIENT
Start: 2021-08-10 | End: 2021-08-10

## 2021-08-10 RX ORDER — ONDANSETRON 2 MG/ML
4 INJECTION INTRAMUSCULAR; INTRAVENOUS EVERY 30 MIN PRN
Status: DISCONTINUED | OUTPATIENT
Start: 2021-08-10 | End: 2021-08-10 | Stop reason: HOSPADM

## 2021-08-10 RX ORDER — HYDROMORPHONE HYDROCHLORIDE 1 MG/ML
0.5 INJECTION, SOLUTION INTRAMUSCULAR; INTRAVENOUS; SUBCUTANEOUS
Status: COMPLETED | OUTPATIENT
Start: 2021-08-10 | End: 2021-08-10

## 2021-08-10 RX ADMIN — ONDANSETRON 4 MG: 2 INJECTION INTRAMUSCULAR; INTRAVENOUS at 11:18

## 2021-08-10 RX ADMIN — HYDROMORPHONE HYDROCHLORIDE 0.5 MG: 1 INJECTION, SOLUTION INTRAMUSCULAR; INTRAVENOUS; SUBCUTANEOUS at 09:37

## 2021-08-10 RX ADMIN — HYDROMORPHONE HYDROCHLORIDE 0.5 MG: 1 INJECTION, SOLUTION INTRAMUSCULAR; INTRAVENOUS; SUBCUTANEOUS at 15:45

## 2021-08-10 RX ADMIN — HYDROMORPHONE HYDROCHLORIDE 0.5 MG: 1 INJECTION, SOLUTION INTRAMUSCULAR; INTRAVENOUS; SUBCUTANEOUS at 13:26

## 2021-08-10 RX ADMIN — HYDROMORPHONE HYDROCHLORIDE 0.5 MG: 1 INJECTION, SOLUTION INTRAMUSCULAR; INTRAVENOUS; SUBCUTANEOUS at 11:51

## 2021-08-10 RX ADMIN — IOPAMIDOL 74 ML: 755 INJECTION, SOLUTION INTRAVENOUS at 12:13

## 2021-08-10 RX ADMIN — HYDROMORPHONE HYDROCHLORIDE 0.5 MG: 1 INJECTION, SOLUTION INTRAMUSCULAR; INTRAVENOUS; SUBCUTANEOUS at 08:16

## 2021-08-10 RX ADMIN — ONDANSETRON 4 MG: 2 INJECTION INTRAMUSCULAR; INTRAVENOUS at 09:38

## 2021-08-10 ASSESSMENT — MIFFLIN-ST. JEOR: SCORE: 1899.6

## 2021-08-10 NOTE — ED TRIAGE NOTES
Pt present to the ER with chest pain. Pt states that it hurts worse when she takes a deep breath. Pt states that she also has back pain.

## 2021-08-10 NOTE — ED PROVIDER NOTES
ED Provider Note  [facility]      History     Chief Complaint   Patient presents with     Chest Pain     HPI  Teresa Sanderson is a 46 year old female with history of breast cancer with bone mets, DVT/PE, currently on Xarelto, presents to the ED with complaint of 2 to 3 days of right-sided pleuritic chest pain.  No significant shortness of breath, though breathing makes the pain worse.  She states she briefly had a coughing fit, but has had no ongoing cough.  No fever, abdominal pain, vomiting, diarrhea, lower extremity pain or swelling.  She has been fairly compliant with his Xarelto but does admit that she is missed a few days recently.  He also complains of low back pain, but states this is chronic and unchanged.  No reported new lower extremity weakness or numbness.  No reported trauma.  She is a smoker.  She does states she has been taking her normal home pain medications without improvement.    Past Medical History  Past Medical History:   Diagnosis Date     Anxiety      Breast CA (H) 12/2020     Depression      DVT (deep venous thrombosis) (H) 2014     Left breast mass     x approximately 4-5 months     Pulmonary emboli (H)      Pyelonephritis      Schizoaffective disorder (H)      Tobacco use      Past Surgical History:   Procedure Laterality Date     IR LUMBAR KYPHOPLASTY VERTEBRAE  5/17/2021     TUBAL LIGATION  1998     anastrozole (ARIMIDEX) 1 MG tablet  doxepin (SILENOR) 6 MG tablet  gabapentin (NEURONTIN) 300 MG capsule  methocarbamol (ROBAXIN) 500 MG tablet  morphine (MS CONTIN) 15 MG CR tablet  naloxone (NARCAN) 4 MG/0.1ML nasal spray  ondansetron (ZOFRAN-ODT) 4 MG ODT tab  oxyCODONE (ROXICODONE) 5 MG tablet  palbociclib (IBRANCE) 125 MG tablet  pantoprazole (PROTONIX) 40 MG EC tablet  polyethylene glycol (MIRALAX) 17 GM/Dose powder  prochlorperazine (COMPAZINE) 10 MG tablet  QUEtiapine (SEROQUEL) 100 MG tablet  rivaroxaban ANTICOAGULANT (XARELTO) 20 MG TABS  tablet  SENNA-docusate sodium (SENNA S) 8.6-50 MG tablet  sertraline (ZOLOFT) 50 MG tablet      Allergies   Allergen Reactions     Contrast Dye      Pt developed nausea after isovue 370 injection on 6/9/21      Family History  Family History   Problem Relation Age of Onset     Lung Cancer Mother      Lung Cancer Father      Lupus Brother      Kidney Disease Brother      Hypertension Other      Diabetes Other      Asthma Son      Diabetes Daughter      Social History   Social History     Tobacco Use     Smoking status: Current Every Day Smoker     Packs/day: 0.25     Types: Cigarettes     Start date: 5/13/2011     Smokeless tobacco: Never Used   Substance Use Topics     Alcohol use: Not Currently     Comment: occ     Drug use: Yes     Types: Marijuana     Comment: occ      Past medical history, past surgical history, medications, allergies, family history, and social history were reviewed with the patient. No additional pertinent items.       Review of Systems  A complete review of systems was performed with pertinent positives and negatives noted in the HPI, and all other systems negative.    Physical Exam   BP: 120/81  Pulse: 72  Temp: 98.7  F (37.1  C)  Resp: 16  Height: 182.9 cm (6')  Weight: 114.8 kg (253 lb)  SpO2: 98 %  Physical Exam  Constitutional:       General: She is in acute distress (appears uncomfortable).      Appearance: She is not diaphoretic.   HENT:      Head: Atraumatic.   Eyes:      General: No scleral icterus.  Cardiovascular:      Heart sounds: Normal heart sounds.   Pulmonary:      Effort: No respiratory distress.      Breath sounds: Normal breath sounds.   Abdominal:      Palpations: Abdomen is soft.      Tenderness: There is no abdominal tenderness.   Musculoskeletal:         General: Tenderness (some midline low back tenderness with palpation) present.   Skin:     General: Skin is warm.      Findings: No rash.         ED Course      Procedures              Results for orders placed or  performed during the hospital encounter of 08/10/21   EKG 12-lead, tracing only     Status: None (Preliminary result)   Result Value Ref Range    Systolic Blood Pressure  mmHg    Diastolic Blood Pressure  mmHg    Ventricular Rate 59 BPM    Atrial Rate 59 BPM    IN Interval 162 ms    QRS Duration 84 ms     ms    QTc 399 ms    P Axis 37 degrees    R AXIS 3 degrees    T Axis 33 degrees    Interpretation ECG       Sinus bradycardia  Nonspecific T wave abnormality  Abnormal ECG       Medications   HYDROmorphone (PF) (DILAUDID) injection 0.5 mg (has no administration in time range)        Assessments & Plan (with Medical Decision Making)   The patient primarily complains of right-sided chest pain which she reports is pleuritic.  She is not tender with palpation of the chest wall.  Sats are normal, she is not tachycardic.  She does have a history of thromboembolism in the past.  We will obtain EKG, labs, troponin, chest x-ray.  There is no clear cause for symptoms based on this, I think we should proceed to chest CT to evaluate for evidence of PE.  Patient is given some Dilaudid IV for pain control.  She additionally does have some midline low back pain, though states this is chronic and unchanged.  No recent trauma.  No reported loss of bowel or bladder control, weakness, numbness.  She will be signed out to the oncoming provider at shift change pending the above results.    Dictation Disclaimer: Some of this Note has been completed with voice-recognition dictation software. Although errors are generally corrected real-time, there is the potential for a rare error to be present in the completed chart..    I have reviewed the nursing notes. I have reviewed the findings, diagnosis, plan and need for follow up with the patient.    New Prescriptions    No medications on file       Final diagnoses:   Pleuritic chest pain   Midline low back pain without sciatica, unspecified chronicity   Metastatic breast cancer (H)        --  Shara Goldsmith  [facility] EMERGENCY DEPARTMENT  8/10/2021     Shara Goldsmith MD  08/10/21 0715

## 2021-08-10 NOTE — TELEPHONE ENCOUNTER
FUTURE VISIT INFORMATION      SURGERY INFORMATION:    Date: 21    Location: [facility] OR    Surgeon:  Arianna Keller MD    Anesthesia Type:  General    Procedure: BILATERAL SALPINGO-OOPHORECTOMY, LAPAROSCOPIC    Consult: OV     RECORDS REQUESTED FROM:       Most recent EKG+ Tracin/10/21

## 2021-08-13 ENCOUNTER — PATIENT OUTREACH (OUTPATIENT)
Dept: ONCOLOGY | Facility: CLINIC | Age: 46
End: 2021-08-13

## 2021-08-13 ENCOUNTER — TELEPHONE (OUTPATIENT)
Dept: PALLIATIVE CARE | Facility: CLINIC | Age: 46
End: 2021-08-13

## 2021-08-13 DIAGNOSIS — C79.51 CARCINOMA OF LEFT BREAST METASTATIC TO BONE (H): ICD-10-CM

## 2021-08-13 DIAGNOSIS — Z17.0 MALIGNANT NEOPLASM OF OVERLAPPING SITES OF LEFT BREAST IN FEMALE, ESTROGEN RECEPTOR POSITIVE (H): Primary | ICD-10-CM

## 2021-08-13 DIAGNOSIS — C50.912 CARCINOMA OF LEFT BREAST METASTATIC TO BONE (H): ICD-10-CM

## 2021-08-13 DIAGNOSIS — C50.812 MALIGNANT NEOPLASM OF OVERLAPPING SITES OF LEFT BREAST IN FEMALE, ESTROGEN RECEPTOR POSITIVE (H): ICD-10-CM

## 2021-08-13 DIAGNOSIS — C50.812 MALIGNANT NEOPLASM OF OVERLAPPING SITES OF LEFT BREAST IN FEMALE, ESTROGEN RECEPTOR POSITIVE (H): Primary | ICD-10-CM

## 2021-08-13 DIAGNOSIS — G89.3 CANCER ASSOCIATED PAIN: ICD-10-CM

## 2021-08-13 DIAGNOSIS — Z17.0 MALIGNANT NEOPLASM OF OVERLAPPING SITES OF LEFT BREAST IN FEMALE, ESTROGEN RECEPTOR POSITIVE (H): ICD-10-CM

## 2021-08-13 RX ORDER — OXYCODONE HYDROCHLORIDE 5 MG/1
5 TABLET ORAL EVERY 6 HOURS PRN
Qty: 28 TABLET | Refills: 0 | Status: SHIPPED | OUTPATIENT
Start: 2021-08-13 | End: 2021-08-19

## 2021-08-13 RX ORDER — IBUPROFEN 200 MG
600 TABLET ORAL EVERY 8 HOURS PRN
COMMUNITY
End: 2021-10-05

## 2021-08-13 RX ORDER — ACETAMINOPHEN 500 MG
1000 TABLET ORAL EVERY 8 HOURS PRN
COMMUNITY
End: 2021-10-05

## 2021-08-13 RX ORDER — DOXEPIN HYDROCHLORIDE 10 MG/1
10 CAPSULE ORAL AT BEDTIME
COMMUNITY
End: 2021-11-04

## 2021-08-13 RX ORDER — MORPHINE SULFATE 15 MG/1
15 TABLET, FILM COATED, EXTENDED RELEASE ORAL AT BEDTIME
Qty: 30 TABLET | Refills: 0 | Status: SHIPPED | OUTPATIENT
Start: 2021-08-13 | End: 2021-08-19

## 2021-08-13 NOTE — PROGRESS NOTES
RN CARE COORDINATION NOTE      Spoke to Teresa to discuss having genetic testing done. She is in agreement with having both testing and a visit with the genetic counselors.     She reports having increased pain in her chest that wraps under her arms. She is needing refills on her Oxycodone and Morphine. She will call palliative care to discuss further.         Miranda Cunha MSN, RN, OCN  RN Care Coordinator  [facility]  [phone]

## 2021-08-13 NOTE — PROGRESS NOTES
Anticoagulation Note - Preoperative Assessment Center (PAC) Pharmacist     Patient was interviewed on August 13, 2021 as a part of PAC clinic appointment. The purpose of this note is to document the perioperative anticoagulation plan outlined by the providers caring for Teresa Sanderson.     Current Regimen  Anticoagulation Regimen as of August 13, 2021: rivaroxaban (XARELTO) 20 mg by mouth once daily with evening meal  Indication: DVT  Prescriber:  HAYDE Kim (Heme/onc)  Current medications that may interact with this include: ibuprofen  Creatinine   Date Value Ref Range Status   08/10/2021 0.77 0.52 - 1.04 mg/dL Final   06/23/2021 0.80 0.52 - 1.04 mg/dL Final       Perioperative plan  Teresa Sanderson is scheduled for BILATERAL SALPINGO-OOPHORECTOMY, LAPAROSCOPIC on 8/20/21 with Dr. Keller and the perioperative anticoagulation plan outlined by PAC staff and HAYDE Kim (heme/onc) is hold x2 days pre op (last dose to be on 8/17/21).     Resumption of anticoagulation after procedure will be based on surgery team assessment of bleeding risks and complications.  This plan may require re-assessment and modification by her primary team in the perioperative setting depending on patients clinical situation.        Dallin Marion [facility]  August 13, 2021  11:45 AM

## 2021-08-13 NOTE — TELEPHONE ENCOUNTER
Patient left a message reporting pain in her chest wall that radiates to her arm. She was recently seen in the ER, labwork and imaging unremarkable. Patient asking for oxycodone refill. Patient no-showed the last 2 palliative clinic visits. Spoke with Dr. Tellez. Pt is to be seen again in clinic for thorough assessment of pain and to make the appropriate recommendation. Called pt, no answer, left message reviewing the above information. Message sent to scheduling to call pt to schedule a visit with palliative next Thursday. Oncology RNCC updated.    MAXI CarvajalN, RN  Palliative Care Nurse Clinician  [phone] (Direct)  [phone] (Appointment Scheduling)

## 2021-08-13 NOTE — PROGRESS NOTES
Called patient to update her that we were notified that palliative care cannot refill her pain medication until she sees them in clinic.   Dr Plunkett has refilled the pain medication for now, she needs to follow up with palliative care. Dr Plunkett has decreased the dose of morphine to 15 mg and oxycodone to 1 tab every 6 hours.     Left detailed voicemail message.

## 2021-08-13 NOTE — PROGRESS NOTES
Preoperative Assessment Center Medication History Note    Medication history completed on August 13, 2021 by this writer. See Epic admission navigator for prior to admission medications. Operating room staff will still need to confirm medications and last dose information on day of surgery.     Medication history interview sources  Patient interview: Yes  Care Everywhere records: No  Surescripts pharmacy refill records: Yes  Other (if applicable):     Changes made to PTA medication list (reason)  Added:  ibuprofen, tylenol.   Deleted: quetiapine (no longer taking - on doxepin),   Changed: doxepin dose, MS CONtin dose/sig,     Additional medication history information (including reliability of information, actions taken by pharmacist):    -- No recent (within 30 days) course of antibiotics  -- No recent (within 30 days) course of systemic steroids  -- Patient declines being on any other prescription or over-the-counter medications    Prior to Admission medications    Medication Sig Last Dose Taking? Auth Provider   acetaminophen (TYLENOL) 500 MG tablet Take 1,000 mg by mouth every 8 hours as needed for mild pain Taking Yes Unknown, Entered By History   anastrozole (ARIMIDEX) 1 MG tablet Take 1 tablet (1 mg) by mouth daily for 28 days  Patient taking differently: Take 1 mg by mouth every evening  Taking Yes Jahaira Plunkett MD   doxepin (SINEQUAN) 10 MG capsule Take 10 mg by mouth At Bedtime Taking Yes Unknown, Entered By History   gabapentin (NEURONTIN) 300 MG capsule Take 2 capsules (600 mg) by mouth every morning AND 2 capsules (600 mg) daily at 2 pm AND 2 capsules (600 mg) At Bedtime. Taking Yes Jahaira Plunkett MD   ibuprofen (ADVIL/MOTRIN) 200 MG tablet Take 600 mg by mouth every 8 hours as needed for mild pain Taking Yes Unknown, Entered By History   methocarbamol (ROBAXIN) 500 MG tablet Take 1 tablet (500 mg) by mouth 4 times daily Taking Yes Jahaira Plunkett MD   morphine  (MS CONTIN) 15 MG CR tablet Take 30 mg by mouth At Bedtime  Taking Yes Ayanna Tellez DO   naloxone (NARCAN) 4 MG/0.1ML nasal spray Spray 1 spray (4 mg) into one nostril alternating nostrils once as needed for opioid reversal every 2-3 minutes until assistance arrives Taking Yes Alee Yang PA-C   ondansetron (ZOFRAN-ODT) 4 MG ODT tab Take 1 tablet (4 mg) by mouth every 6 hours as needed for nausea or vomiting Taking Yes Clyde Garcia MD   oxyCODONE (ROXICODONE) 5 MG tablet Take 1-2 tablets (5-10 mg) by mouth every 3 hours as needed for moderate to severe pain Taking Yes Ayanna Tellez DO   palbociclib (IBRANCE) 125 MG tablet Take 1 tablet (125 mg) by mouth daily Take for 21 days, then 7 days off.  Patient taking differently: Take 125 mg by mouth every evening Take for 21 days, then 7 days off. Taking Yes Jahaira Plunkett MD   pantoprazole (PROTONIX) 40 MG EC tablet Take 1 tablet (40 mg) by mouth every morning (before breakfast) Taking Yes Jahaira Plunkett MD   polyethylene glycol (MIRALAX) 17 GM/Dose powder Take 17 g by mouth daily as needed for constipation Taking Yes Ayanna Tellez DO   prochlorperazine (COMPAZINE) 10 MG tablet Take 1 tablet (10 mg) by mouth every 6 hours as needed (Nausea/Vomiting) Taking Yes Jahaira Plunkett MD   rivaroxaban ANTICOAGULANT (XARELTO) 20 MG TABS tablet Take 1 tablet (20 mg) by mouth daily (with dinner) Taking Yes Alee Yang PA-C   SENNA-docusate sodium (SENNA S) 8.6-50 MG tablet Take 2 tablets by mouth 2 times daily as needed (Constipation) Taking Yes Ayanna Tellez DO   sertraline (ZOLOFT) 50 MG tablet Take 2 tablets (100 mg) by mouth daily Taking Yes Ayanna Tellez DO     Medication history completed by: Dallin Marion, [facility]

## 2021-08-14 ENCOUNTER — TELEPHONE (OUTPATIENT)
Dept: INTERVENTIONAL RADIOLOGY/VASCULAR | Facility: CLINIC | Age: 46
End: 2021-08-14

## 2021-08-14 NOTE — TELEPHONE ENCOUNTER
Spoke with patient regarding appointments to be scheduled.    US 9/20 at 8:00  In person visit with  9/20 at 9:00.    Patient is aware of date, time,and location and had no further questions just said thank you.    Itinerary sent.

## 2021-08-15 ENCOUNTER — HEALTH MAINTENANCE LETTER (OUTPATIENT)
Age: 46
End: 2021-08-15

## 2021-08-15 ENCOUNTER — DOCUMENTATION ONLY (OUTPATIENT)
Dept: ONCOLOGY | Facility: CLINIC | Age: 46
End: 2021-08-15

## 2021-08-15 NOTE — PROGRESS NOTES
Called Cleiris to discuss the fact I will be unavailable next week to do her surgery.   I offered options  Of rescheduling  Versus having  My partner cover. She prefers  To move forward with her BSO. She will meet my partner (likely  Dr Rory Lopez)  Morning of surgery.      I apologized for the inconvenience.     Arianna Keller MD  Gynecologic Oncology  Pager [phone]

## 2021-08-16 ENCOUNTER — APPOINTMENT (OUTPATIENT)
Dept: LAB | Facility: CLINIC | Age: 46
End: 2021-08-16
Attending: INTERNAL MEDICINE
Payer: COMMERCIAL

## 2021-08-16 ENCOUNTER — LAB (OUTPATIENT)
Dept: LAB | Facility: CLINIC | Age: 46
End: 2021-08-16
Attending: OBSTETRICS & GYNECOLOGY

## 2021-08-16 ENCOUNTER — ONCOLOGY VISIT (OUTPATIENT)
Dept: ONCOLOGY | Facility: CLINIC | Age: 46
End: 2021-08-16
Attending: INTERNAL MEDICINE
Payer: COMMERCIAL

## 2021-08-16 ENCOUNTER — PREP FOR PROCEDURE (OUTPATIENT)
Dept: ONCOLOGY | Facility: CLINIC | Age: 46
End: 2021-08-16

## 2021-08-16 VITALS
WEIGHT: 248 LBS | DIASTOLIC BLOOD PRESSURE: 87 MMHG | RESPIRATION RATE: 16 BRPM | BODY MASS INDEX: 33.63 KG/M2 | OXYGEN SATURATION: 98 % | SYSTOLIC BLOOD PRESSURE: 126 MMHG | HEART RATE: 72 BPM | TEMPERATURE: 99.4 F

## 2021-08-16 DIAGNOSIS — C79.51 CARCINOMA OF LEFT BREAST METASTATIC TO BONE (H): Primary | ICD-10-CM

## 2021-08-16 DIAGNOSIS — R07.89 RIGHT-SIDED CHEST WALL PAIN: ICD-10-CM

## 2021-08-16 DIAGNOSIS — C50.912 CARCINOMA OF LEFT BREAST METASTATIC TO BONE (H): Primary | ICD-10-CM

## 2021-08-16 DIAGNOSIS — C50.812 MALIGNANT NEOPLASM OF OVERLAPPING SITES OF LEFT BREAST IN FEMALE, ESTROGEN RECEPTOR POSITIVE (H): ICD-10-CM

## 2021-08-16 DIAGNOSIS — Z17.0 MALIGNANT NEOPLASM OF OVERLAPPING SITES OF LEFT BREAST IN FEMALE, ESTROGEN RECEPTOR POSITIVE (H): ICD-10-CM

## 2021-08-16 DIAGNOSIS — Z11.59 ENCOUNTER FOR SCREENING FOR OTHER VIRAL DISEASES: ICD-10-CM

## 2021-08-16 DIAGNOSIS — Z71.6 ENCOUNTER FOR SMOKING CESSATION COUNSELING: ICD-10-CM

## 2021-08-16 LAB — SARS-COV-2 RNA RESP QL NAA+PROBE: NEGATIVE

## 2021-08-16 PROCEDURE — G0463 HOSPITAL OUTPT CLINIC VISIT: HCPCS

## 2021-08-16 PROCEDURE — U0005 INFEC AGEN DETEC AMPLI PROBE: HCPCS | Performed by: OBSTETRICS & GYNECOLOGY

## 2021-08-16 PROCEDURE — 99215 OFFICE O/P EST HI 40 MIN: CPT | Performed by: PHYSICIAN ASSISTANT

## 2021-08-16 ASSESSMENT — PAIN SCALES - GENERAL: PAINLEVEL: WORST PAIN (10)

## 2021-08-16 NOTE — OP NOTE
GYNECOLOGIC ONCOLOGY OPERATIVE NOTE    Date of Service: 8/20/2021  Patient Name: Teresa Sanderson  MRN: [id number]      Pre-operative diagnosis:  1. ER/DE positive, HER2 neg Stage IV breast cancer, on zoladex   3. Bone metastases     Post-operative diagnosis:  1. same as above - s/p below procedure     Procedure:   Laparoscopic bilateral salpingo-oophorectomy     Surgeon: Rory Lopez MD  Assistants:   - Mami Nielson MD PGY3  - Tari Hernández MS4     Anesthesia: General     EBL: 5 mL  Urine: 50 mL clear  Fluids: 500 mL cystalloid     Specimens:   Specimens:   ID Type Source Tests Collected by Time Destination   1 : Pelvic washings Washings Pelvis NON-GYNECOLOGIC CYTOLOGY Rory Lopez MD 8/20/2021  7:56 AM     2 :  Tissue Ovary and Fallopian Tube, Right SURGICAL PATHOLOGY EXAM Rory Lopez MD 8/20/2021  8:14 AM     3 :  Tissue Ovary and Fallopian Tube, Left SURGICAL PATHOLOGY EXAM Rory Lopez MD 8/20/2021  8:14 AM        Complications: None     Findings: Normal appearing external female genitalia, cervix small, uterus small and mobile on bimanual exam. Normal laparoscopic abdominal survey without signs of injury or intra-abdominal metastatic disease. Normal appearing uterus, fallopian tubes, bilateral ovaries. Pedicles hemostatic at end of case.     Indications: Teresa Sanderson is a 46 year old female with ER/DE positive, HER2 neg Stage IV breast cancer, metastasized to bone. She has been on zoladex injections for hormonal suppression and BSO was recommended to eliminate the need for these. Discussed risks, benefits, and alternatives to the procedure including risk of infection, bleeding, recurrence. The patient's questions were answered, understanding confirmed, and the patient signed written informed consent.    Procedure details: The patient was taken to the operating room where she was placed in the dorsal lithotomy position with feet in yellow fin  marco a. General endotracheal anesthesia was administered. An exam under anesthesia was performed. The patient was then prepped and draped in the usual sterile fashion. Crisostomo catheter was placed.     The umbilicus was everted and an 11-blade scalpel was used to make a 5 mm vertical incision within the umbilicus. The Veress needle was placed, and intraperitoneal placement was suggested by the saline drop test and an opening pressure of less than 5 mmHg.  The abdomen was then insufflated to a maximum pressure of 15 mmHg. The Veress needle was removed and a 5 mm trochar was placed.  The laparoscope was placed, and a survey of the abdomen revealed the findings noted above. No trauma from entry was noted. There was no overt evidence of metastatic disease.  Attention was turned to the left lower quadrant. At a point 2 cm superomedial to the ASIS, a 12 mm incision was made, and a 12 mm trocar was placed under direct visualization.  Attention was then turned to the right lower quadrant. Similarly, at a point 2 cm superomedial to the ASIS, a 5 mm incision was made, and a 5 mm trocar was placed under direct visualization.  The patient was placed in steep Trendelenburg position. Pelvic washings were collected.     At this point, the right ureter was identified transperitoneally, and noted to peristalse. The retroperitoneum was opened parallel along the infundibulopelvic ligament using monopolar cautery. The right infundibulopelvic ligament was then identified, isolated and carefully transected with LigaSure. The LigaSure as then used to transect the mesosalpinx parallel to the fallopian tube in sequential bites, and then cauterized and transected at the level of the cornua. The left ureter was then identified transperitoneally, noted to peristalse. The left infundibulopelvic ligament was identified and isolated using similar procedure as listed prior. LigaSure was used to transect the mesosalpinx along the fallopian tube,  cauterize and transect the tube at the level of the cornua. Good hemostasis was noted at all excision sites.     The 12 mm fascial incision was closed with an 0 Vicryl suture using the Huseyin-Connor device. The torres catheter was then removed and the cystoscope was placed in the bladder. Efflux was noted from bilateral ureteral orifices. A complete survey of the bladder revealed no injuries or sutures. All skin incisions were closed using 4-0 Monocryl, and covered with steri strips and a sterile dressing.    All sponge, needle, and instrument counts were correct. The patient tolerated the procedure well and was transferred to recovery in stable condition. Dr. Lopez was present and scrubbed for the entire procedure.    Mami Nielson MD MSc  OBGYN Resident, PGY3  August 20, 2021, 9:14 AM   GYN ONC Pager [phone]

## 2021-08-16 NOTE — NURSING NOTE
Oncology Rooming Note    August 16, 2021 1:30 PM   Teresa Sanderson is a 46 year old female who presents for:    Chief Complaint   Patient presents with     Blood Draw     Labs drawn via  by RN in lab. VS taken.      Oncology Clinic Visit     Carcinoma of left breast metastatic to bone     Initial Vitals: /87   Pulse 72   Temp 99.4  F (37.4  C) (Oral)   Resp 16   Wt 112.5 kg (248 lb)   SpO2 98%   BMI 33.63 kg/m   Estimated body mass index is 33.63 kg/m  as calculated from the following:    Height as of 8/10/21: 1.829 m (6').    Weight as of this encounter: 112.5 kg (248 lb). Body surface area is 2.39 meters squared.  Worst Pain (10) Comment: Data Unavailable   No LMP recorded. Patient has had an injection.  Allergies reviewed: Yes  Medications reviewed: Yes    Medications: Medication refills not needed today.  Pharmacy name entered into EPIC:    [facility] - [place], [place] - [phone]  [facility] - [place] [address] [facility] [place], [place] [address] [id number] [place], [place] [address] [facility] [address]   [facility] [place], [place] - [address] [facility]  [id number] [place], [place] - [address] [facility] [address]  [facility] - [place], [place] - [address] [facility]  [facility] - [place], [place] - [address]    Clinical concerns: New concerns: Right-sided chest pain 10/10-constant and persistent with activity and breathing. Recent hospital visit for chest pain.        Serenity Alonzo LPN August 16, 2021 1:31 PM

## 2021-08-16 NOTE — PROGRESS NOTES
Oncology Visit:   Date on this visit: 08/16/21    Diagnosis:  ER positive left breast cancer metastasized to bones.       History Of Present Illness:    Ms. Sanderson is a 46 year old female with a h/o tobacco abuse and DVTs with left breast cancer metastasized to bone. She presented to [facility] ED with back pain on 12/5/2020. MRI of the L-spine showed an abnormal L4 lesion with associated right paraspinal mass, abnormalities in L5 and the left iliac bone were also seen. CT C/A/P showed a left breast mass, lytic lesions of T7, L4, and the pelvis, and a 3 cm lesion in the kidney (thought to be a cyst). Ultrasound of the bilateral lower extremities showed a non-occlusive thrombus in the left popliteal vein. Mammogram and ultrasound of the bilateral breasts on 12/17/2020 showed a spiculated mass measuring at least 7.8 cm at 12-1:00 left breast extending from the nipple to 9 cm from the nipple with associated nipple retraction. This mass was biopsied, and showed IDC with surrounding DCIS, grade 3, ER+ 90%, and TX+ 75%.  HER2 was equivocal in approximately 35% of tumor cells by FISH and was negative by IHC.    Metastatic Breast Cancer Treatment:  12/23/2021 - 1/7/2021  Radiation (3000 cGy) to the lumbar spine.  1/29/2021 - present  Ibrance, zoladex, and anastrozole.  5/17/2021 radiofrequency ablation, kyphoplasty to L4    Interval History:  Teresa was seen in the ER on 8/10 for worsening right sided chest pain. The pain is worse when taking deep breaths, movement, or anxiety. The pain starts at her clavicle, radiates down the front of her chest and her axilla, and into her back. Pain started 3-4 days prior to the ED. No coughing, no fevers chills. She notes that this is the same pain that she was experiencing when she last saw Dr. Plunkett, but it got worse. She doesn't feel short of breath, just pain with deep breathing. No chest pressure. 20/10 pain at worst, 6/10 at best.  Was taking Oxydone 15mg every 3 hours, but  ran out of prescription early so has not had any since last week. No rashes. No lumps or bumps in the breast, or under her arm. No injury. No other new aches or pain. No headaches or dizziness.     Of note, she is interested in stopping smoking. Currently smokes ~5 cigarettes a day. She is also ready to get the COVID vaccination now.         Past Medical/Surgical History:   Past Medical History:   Diagnosis Date     Anxiety      Breast CA (H) 12/2020     Depression      DVT (deep venous thrombosis) (H) 2014     Left breast mass     x approximately 4-5 months     Pulmonary emboli (H)      Pyelonephritis      Schizoaffective disorder (H)      Tobacco use      Past Surgical History:   Procedure Laterality Date     IR LUMBAR KYPHOPLASTY VERTEBRAE  5/17/2021     TUBAL LIGATION  1998        Allergies   Allergen Reactions     Contrast Dye      Pt developed nausea after isovue 370 injection on 6/9/21        Current Outpatient Medications   Medication     acetaminophen (TYLENOL) 500 MG tablet     anastrozole (ARIMIDEX) 1 MG tablet     doxepin (SINEQUAN) 10 MG capsule     gabapentin (NEURONTIN) 300 MG capsule     ibuprofen (ADVIL/MOTRIN) 200 MG tablet     methocarbamol (ROBAXIN) 500 MG tablet     morphine (MS CONTIN) 15 MG CR tablet     naloxone (NARCAN) 4 MG/0.1ML nasal spray     ondansetron (ZOFRAN-ODT) 4 MG ODT tab     oxyCODONE (ROXICODONE) 5 MG tablet     pantoprazole (PROTONIX) 40 MG EC tablet     polyethylene glycol (MIRALAX) 17 GM/Dose powder     prochlorperazine (COMPAZINE) 10 MG tablet     rivaroxaban ANTICOAGULANT (XARELTO) 20 MG TABS tablet     SENNA-docusate sodium (SENNA S) 8.6-50 MG tablet     sertraline (ZOLOFT) 50 MG tablet     palbociclib (IBRANCE) 125 MG tablet     No current facility-administered medications for this visit.   '      Physical Exam:   /87   Pulse 72   Temp 99.4  F (37.4  C) (Oral)   Resp 16   Wt 112.5 kg (248 lb)   SpO2 98%   BMI 33.63 kg/m    VS: Above vitals reviewed, stable  without concerns   General: Resting comfortably in no acute distress  Head: Normocephalic, atraumatic   EENT: No scleral icteris, EOMS intact. Mucous membranes are moist, no mucositis or oral lesions  Heart: Regular rate and rhythm, no murmurs  Lung: Normal respiratory effort. Clear to auscultation bilaterally. No wheezes, rhonchi, or crackles   Abdomen: +Bowel Sounds, soft nontender to palpation. No rebound, guarding or rigidity. No palpable masses  Neuro: AAO x3. Gait is steady. Moving all extremities   Extremities: No lower extremity edema  Skin: Warm, dry, intact. No rashes, no suspicious lesions. No petechia or bruising noted  MSK: Tender to light palpation along clavicle, right chest wall, right axilla, right lateral rib cage, right scapula. No overlying deformities, nodules, masses or lesions  Breast: Right breast without any palpable masses. No overlying skin changes. No axillary lymphadenopathy        Laboratory/Imaging Studies   6/9/2021 PET/CT:  - No hypermetabolic disease in the chest, abdomen, or pelvis.  - Mild FDG uptake in the region of the left breast is below background c/w complete response.  - Scattered mixed lytic and sclerotic osseous foci, likely representing healed metastatic disease.  - Left greater than right perihilar GGO.  - Kyphoplasty changes to L4.    Most Recent 3 CBC's:Recent Labs   Lab Test 08/16/21  1258 08/10/21  0808 07/19/21  1228   WBC 5.9 5.2 2.7*   HGB 10.8* 10.5* 11.1*   MCV 93 95 94    261 190    Most Recent 3 BMP's:  Recent Labs   Lab Test 08/16/21  1258 08/10/21  0808 07/19/21  1228    140 142   POTASSIUM 3.6 3.7 3.8   CHLORIDE 111* 107 115*   CO2 27 25 27   BUN 7 13 12   CR 0.74 0.77 1.07*   ANIONGAP 5 8 <1*   NANDO 8.9 8.7 8.8   * 102* 120*    Most Recent 2 LFT's:  Recent Labs   Lab Test 08/16/21  1258 07/19/21  1228   AST 26 10   ALT 40 16   ALKPHOS 100 86   BILITOTAL 0.2 0.2    I reviewed the above labs today.    ASSESSMENT/PLAN:   46 year old female  with history of DVT with left breast invasive ductal carcinoma, ER/MD positive, HER2 negative metastasized to bones.    1.  Metastatic breast cancer:  On treatment with Ibrance, zoladex, and anastrozole. Her tumor markers remain stable, pending today. Will continue to monitor monthly tumor markers. Plan to repeat imaging in 12/2021, sooner if symptomatic of disease progression or increasing trend in tumor markers. Today we will hold zoladex given her upcoming BSO in four days. She will restart her Ibrance 1 week following her surgery. Continue anastrozole.     2.  Right chest wall pain: Increased. Recent work up in ER done with EKG, troponin, CT PE rule out were all normal. Recent PET scan with CR, and tumor markers stable. Exam today is normal. Unclear etiology seems to be an element of hyper analgesia as she has pain with light touch . Continue gabapentin dosing to 600 mg PO TID.  Continue methocarbamol, lidocaine patches, and heat prn. Discussed staying compliant with oxycodone 5mg q6, and MS contin 15mg at bedtime. Has a follow up appointment with palliative care later this week     3.  Bone metastases:  She is s/p XRT to the lumbar spine and kyphoplasty of L4.  She is taking MS contin 30 mg PO BID and oxycodone prn.  On Zometa since 1/18/2021 and is receiving once every 3 months.  Was due to receive Zometa today, however, will hold given dental abscesses.    4.  Dental abscesses: Provided with dentist number again today, encouraged to follow up soon given immunosuppression.     5.  DVT:  Recommend continuing Xarelto until contraindicated given metastatic disease. Currently on hold given upcoming surgery     6. GYN: Plans for BSO on 8/20, will hold off on zoladex today    7. COVID: She will be getting COVID vaccination after surgery     8. Smoking: Will prescribe nicorette gum today to use PRN     Follow up: In 4 weeks as scheduled for labs and Dr. Plunkett.     Patient will call in the interim with any  questions or concerns. They voice understanding and agree with the above plan.     50 minutes spent on the date of the encounter doing chart review, review of test results, interpretation of tests, patient visit and documentation     The patient was seen in conjunction with Micheline Nelson PA-C who served as a scribe for today's visit. I have reviewed and edited the above note, and agree with the above findings and plan.    CRISTIANE KimC

## 2021-08-16 NOTE — NURSING NOTE
Chief Complaint   Patient presents with     Blood Draw     Labs drawn via  by RN in lab. VS taken.          Davon Plasencia RN

## 2021-08-16 NOTE — LETTER
8/16/2021         RE: Teresa Sanderson  [address] [facility] [address]      Oncology Visit:   Date on this visit: 08/16/21    Diagnosis:  ER positive left breast cancer metastasized to bones.       History Of Present Illness:    Ms. Sanderson is a 46 year old female with a h/o tobacco abuse and DVTs with left breast cancer metastasized to bone. She presented to [facility] ED with back pain on 12/5/2020. MRI of the L-spine showed an abnormal L4 lesion with associated right paraspinal mass, abnormalities in L5 and the left iliac bone were also seen. CT C/A/P showed a left breast mass, lytic lesions of T7, L4, and the pelvis, and a 3 cm lesion in the kidney (thought to be a cyst). Ultrasound of the bilateral lower extremities showed a non-occlusive thrombus in the left popliteal vein. Mammogram and ultrasound of the bilateral breasts on 12/17/2020 showed a spiculated mass measuring at least 7.8 cm at 12-1:00 left breast extending from the nipple to 9 cm from the nipple with associated nipple retraction. This mass was biopsied, and showed IDC with surrounding DCIS, grade 3, ER+ 90%, and MN+ 75%.  HER2 was equivocal in approximately 35% of tumor cells by FISH and was negative by IHC.    Metastatic Breast Cancer Treatment:  12/23/2021 - 1/7/2021  Radiation (3000 cGy) to the lumbar spine.  1/29/2021 - present  Ibrance, zoladex, and anastrozole.  5/17/2021 radiofrequency ablation, kyphoplasty to L4    Interval History:  Teresa was seen in the ER on 8/10 for worsening right sided chest pain. The pain is worse when taking deep breaths, movement, or anxiety. The pain starts at her clavicle, radiates down the front of her chest and her axilla, and into her back. Pain started 3-4 days prior to the ED. No coughing, no fevers chills. She notes that this is the same pain that she was experiencing when she last saw Dr. Plunkett, but it got worse. She doesn't feel short of breath, just pain with deep breathing. No  chest pressure. 20/10 pain at worst, 6/10 at best.  Was taking Oxydone 15mg every 3 hours, but ran out of prescription early so has not had any since last week. No rashes. No lumps or bumps in the breast, or under her arm. No injury. No other new aches or pain. No headaches or dizziness.     Of note, she is interested in stopping smoking. Currently smokes ~5 cigarettes a day. She is also ready to get the COVID vaccination now.         Past Medical/Surgical History:   Past Medical History:   Diagnosis Date     Anxiety      Breast CA (H) 12/2020     Depression      DVT (deep venous thrombosis) (H) 2014     Left breast mass     x approximately 4-5 months     Pulmonary emboli (H)      Pyelonephritis      Schizoaffective disorder (H)      Tobacco use      Past Surgical History:   Procedure Laterality Date     IR LUMBAR KYPHOPLASTY VERTEBRAE  5/17/2021     TUBAL LIGATION  1998        Allergies   Allergen Reactions     Contrast Dye      Pt developed nausea after isovue 370 injection on 6/9/21        Current Outpatient Medications   Medication     acetaminophen (TYLENOL) 500 MG tablet     anastrozole (ARIMIDEX) 1 MG tablet     doxepin (SINEQUAN) 10 MG capsule     gabapentin (NEURONTIN) 300 MG capsule     ibuprofen (ADVIL/MOTRIN) 200 MG tablet     methocarbamol (ROBAXIN) 500 MG tablet     morphine (MS CONTIN) 15 MG CR tablet     naloxone (NARCAN) 4 MG/0.1ML nasal spray     ondansetron (ZOFRAN-ODT) 4 MG ODT tab     oxyCODONE (ROXICODONE) 5 MG tablet     pantoprazole (PROTONIX) 40 MG EC tablet     polyethylene glycol (MIRALAX) 17 GM/Dose powder     prochlorperazine (COMPAZINE) 10 MG tablet     rivaroxaban ANTICOAGULANT (XARELTO) 20 MG TABS tablet     SENNA-docusate sodium (SENNA S) 8.6-50 MG tablet     sertraline (ZOLOFT) 50 MG tablet     palbociclib (IBRANCE) 125 MG tablet     No current facility-administered medications for this visit.   '      Physical Exam:   /87   Pulse 72   Temp 99.4  F (37.4  C) (Oral)   Resp  16   Wt 112.5 kg (248 lb)   SpO2 98%   BMI 33.63 kg/m    VS: Above vitals reviewed, stable without concerns   General: Resting comfortably in no acute distress  Head: Normocephalic, atraumatic   EENT: No scleral icteris, EOMS intact. Mucous membranes are moist, no mucositis or oral lesions  Heart: Regular rate and rhythm, no murmurs  Lung: Normal respiratory effort. Clear to auscultation bilaterally. No wheezes, rhonchi, or crackles   Abdomen: +Bowel Sounds, soft nontender to palpation. No rebound, guarding or rigidity. No palpable masses  Neuro: AAO x3. Gait is steady. Moving all extremities   Extremities: No lower extremity edema  Skin: Warm, dry, intact. No rashes, no suspicious lesions. No petechia or bruising noted  MSK: Tender to light palpation along clavicle, right chest wall, right axilla, right lateral rib cage, right scapula. No overlying deformities, nodules, masses or lesions  Breast: Right breast without any palpable masses. No overlying skin changes. No axillary lymphadenopathy        Laboratory/Imaging Studies   6/9/2021 PET/CT:  - No hypermetabolic disease in the chest, abdomen, or pelvis.  - Mild FDG uptake in the region of the left breast is below background c/w complete response.  - Scattered mixed lytic and sclerotic osseous foci, likely representing healed metastatic disease.  - Left greater than right perihilar GGO.  - Kyphoplasty changes to L4.    Most Recent 3 CBC's:Recent Labs   Lab Test 08/16/21  1258 08/10/21  0808 07/19/21  1228   WBC 5.9 5.2 2.7*   HGB 10.8* 10.5* 11.1*   MCV 93 95 94    261 190    Most Recent 3 BMP's:  Recent Labs   Lab Test 08/16/21  1258 08/10/21  0808 07/19/21  1228    140 142   POTASSIUM 3.6 3.7 3.8   CHLORIDE 111* 107 115*   CO2 27 25 27   BUN 7 13 12   CR 0.74 0.77 1.07*   ANIONGAP 5 8 <1*   NANDO 8.9 8.7 8.8   * 102* 120*    Most Recent 2 LFT's:  Recent Labs   Lab Test 08/16/21  1258 07/19/21  1228   AST 26 10   ALT 40 16   ALKPHOS 100 86    BILITOTAL 0.2 0.2    I reviewed the above labs today.    ASSESSMENT/PLAN:   46 year old female with history of DVT with left breast invasive ductal carcinoma, ER/[place] positive, HER2 negative metastasized to bones.    1.  Metastatic breast cancer:  On treatment with Ibrance, zoladex, and anastrozole. Her tumor markers remain stable, pending today. Will continue to monitor monthly tumor markers. Plan to repeat imaging in 12/2021, sooner if symptomatic of disease progression or increasing trend in tumor markers. Today we will hold zoladex given her upcoming BSO in four days. She will restart her Ibrance 1 week following her surgery. Continue anastrozole.     2.  Right chest wall pain: Increased. Recent work up in ER done with EKG, troponin, CT PE rule out were all normal. Recent PET scan with CR, and tumor markers stable. Exam today is normal. Unclear etiology seems to be an element of hyper analgesia as she has pain with light touch . Continue gabapentin dosing to 600 mg PO TID.  Continue methocarbamol, lidocaine patches, and heat prn. Discussed staying compliant with oxycodone 5mg q6, and MS contin 15mg at bedtime. Has a follow up appointment with palliative care later this week     3.  Bone metastases:  She is s/p XRT to the lumbar spine and kyphoplasty of L4.  She is taking MS contin 30 mg PO BID and oxycodone prn.  On Zometa since 1/18/2021 and is receiving once every 3 months.  Was due to receive Zometa today, however, will hold given dental abscesses.    4.  Dental abscesses: Provided with dentist number again today, encouraged to follow up soon given immunosuppression.     5.  DVT:  Recommend continuing Xarelto until contraindicated given metastatic disease. Currently on hold given upcoming surgery     6. GYN: Plans for BSO on 8/20, will hold off on zoladex today    7. COVID: She will be getting COVID vaccination after surgery     8. Smoking: Will prescribe nicorette gum today to use PRN     Follow up: In 4  weeks as scheduled for labs and Dr. Plunkett.     Patient will call in the interim with any questions or concerns. They voice understanding and agree with the above plan.     50 minutes spent on the date of the encounter doing chart review, review of test results, interpretation of tests, patient visit and documentation     The patient was seen in conjunction with Micheline Nelson PA-C who served as a scribe for today's visit. I have reviewed and edited the above note, and agree with the above findings and plan.    RICHARD Kim PA-C

## 2021-08-16 NOTE — PROGRESS NOTES
I have reviewed this case and am willing to fill in for Dr. Keller who is medically unavailable.    Rory Lopez MD

## 2021-08-17 ENCOUNTER — ANESTHESIA EVENT (OUTPATIENT)
Dept: SURGERY | Facility: AMBULATORY SURGERY CENTER | Age: 46
End: 2021-08-17
Payer: COMMERCIAL

## 2021-08-17 ENCOUNTER — VIRTUAL VISIT (OUTPATIENT)
Dept: SURGERY | Facility: CLINIC | Age: 46
End: 2021-08-17
Payer: COMMERCIAL

## 2021-08-17 ENCOUNTER — PRE VISIT (OUTPATIENT)
Dept: SURGERY | Facility: CLINIC | Age: 46
End: 2021-08-17

## 2021-08-17 ENCOUNTER — DOCUMENTATION ONLY (OUTPATIENT)
Dept: ONCOLOGY | Facility: CLINIC | Age: 46
End: 2021-08-17

## 2021-08-17 DIAGNOSIS — Z01.818 PREOP EXAMINATION: Primary | ICD-10-CM

## 2021-08-17 PROCEDURE — 99204 OFFICE O/P NEW MOD 45 MIN: CPT | Mod: 95 | Performed by: PHYSICIAN ASSISTANT

## 2021-08-17 ASSESSMENT — LIFESTYLE VARIABLES: TOBACCO_USE: 1

## 2021-08-17 ASSESSMENT — ENCOUNTER SYMPTOMS: SEIZURES: 0

## 2021-08-17 NOTE — PROGRESS NOTES
Teresa is a 46 year old who is being evaluated via a billable video visit.      How would you like to obtain your AVS? MyChart and Mail  If the video visit is dropped, the invitation should be resent by: Text to cell phone: [phone]  HPI         Review of Systems              Physical Exam     SHELBIE Loza LPN

## 2021-08-17 NOTE — H&P (VIEW-ONLY)
Pre-Operative H & P     CC:  Preoperative exam to assess for increased cardiopulmonary risk while undergoing surgery and anesthesia.    Date of Encounter: 8/17/2021  Primary Care Physician:  No Ref-Primary, Physician     Reason for Visit: Carcinoma of left breast metastatic to bone (H)    Video-Visit Details    Type of service:  Video Visit    Patient verbally consented to video service today: YES      Video Start Time: 1127  Video End Time (time video stopped): 1145    Originating Location (pt. Location): Other (in parked vehicle)    Distant Location (provider location):  [facility] PREOPERATIVE [facility]     Mode of Communication:  Video Conference via [email or web address]    [facility]  Teresa Sanderson is a 45 y/o female who presents for pre-operative H&P in preparation for BILATERAL SALPINGO-OOPHORECTOMY, LAPAROSCOPIC with Rory Lopez MD on 8/20/21 at [facility] for treatment of Carcinoma of left breast metastatic to bone (H). Patient is being evaluated for comorbid conditions of tobacco dependence, h/o DVT/PE, anticoagulated, GERD, anxiety, depression, migraines, hot flashes, dental abscesses.    Ms. Sanderson has a history of ER/KS positive, HER2 neg Stage IV breast cancer with bone metastases. She is on treatment with Ibrance, zoladex, and anastrozole. She now presents for the above procedure.    She was seen in the ED on 8/10 for right chest wall pain. EKG, troponin, CT PE rule out were all normal.  Unclear etiology seems to be an element of hyper analgesia as she has pain with light touch. Continue gabapentin dosing to 600 mg PO TID.  Continue methocarbamol, lidocaine patches, and heat prn. She ran out of oxycodone several days prior. Discussed staying compliant with oxycodone 5mg q6, and MS contin 15mg at bedtime. Has a follow up appointment with palliative care.    History was obtained from patient & chart review.     Hx of abnormal bleeding or anti-platelet use: on Xarelto    Menstrual  history: No LMP recorded. Patient has had an injection.:      Prior to Admission Medications  Current Outpatient Medications   Medication Sig Dispense Refill     acetaminophen (TYLENOL) 500 MG tablet Take 1,000 mg by mouth every 8 hours as needed for mild pain       anastrozole (ARIMIDEX) 1 MG tablet Take 1 tablet (1 mg) by mouth daily for 28 days (Patient taking differently: Take 1 mg by mouth every evening ) 28 tablet 0     doxepin (SINEQUAN) 10 MG capsule Take 10 mg by mouth At Bedtime       gabapentin (NEURONTIN) 300 MG capsule Take 2 capsules (600 mg) by mouth every morning AND 2 capsules (600 mg) daily at 2 pm AND 2 capsules (600 mg) At Bedtime. 180 capsule 0     ibuprofen (ADVIL/MOTRIN) 200 MG tablet Take 600 mg by mouth every 8 hours as needed for mild pain       methocarbamol (ROBAXIN) 500 MG tablet Take 1 tablet (500 mg) by mouth 4 times daily 120 tablet 0     naloxone (NARCAN) 4 MG/0.1ML nasal spray Spray 1 spray (4 mg) into one nostril alternating nostrils once as needed for opioid reversal every 2-3 minutes until assistance arrives 0.2 mL 0     ondansetron (ZOFRAN-ODT) 4 MG ODT tab Take 1 tablet (4 mg) by mouth every 6 hours as needed for nausea or vomiting 120 tablet 0     pantoprazole (PROTONIX) 40 MG EC tablet Take 1 tablet (40 mg) by mouth every morning (before breakfast) 30 tablet 1     polyethylene glycol (MIRALAX) 17 GM/Dose powder Take 17 g by mouth daily as needed for constipation 578 g 3     prochlorperazine (COMPAZINE) 10 MG tablet Take 1 tablet (10 mg) by mouth every 6 hours as needed (Nausea/Vomiting) 30 tablet 2     rivaroxaban ANTICOAGULANT (XARELTO) 20 MG TABS tablet Take 1 tablet (20 mg) by mouth daily (with dinner) 30 tablet 11     SENNA-docusate sodium (SENNA S) 8.6-50 MG tablet Take 2 tablets by mouth 2 times daily as needed (Constipation) 100 tablet 3     sertraline (ZOLOFT) 50 MG tablet Take 2 tablets (100 mg) by mouth daily 60 tablet 1     morphine (MS CONTIN) 15 MG CR tablet Take  1 tablet (15 mg) by mouth At Bedtime 30 tablet 0     nicotine (NICORETTE) 2 MG gum Place 1 each (2 mg) inside cheek every hour as needed for smoking cessation 90 each 1     oxyCODONE (ROXICODONE) 5 MG tablet Take 1 tablet (5 mg) by mouth every 6 hours as needed for moderate to severe pain 28 tablet 0     palbociclib (IBRANCE) 125 MG tablet Take 1 tablet (125 mg) by mouth daily Take for 21 days, then 7 days off. (Patient not taking: Reported on 2021) 21 tablet 0       Family History  Family History   Problem Relation Age of Onset     Lung Cancer Mother      Lung Cancer Father      Lupus Brother      Kidney Disease Brother      Diabetes Daughter      Asthma Son      Cardiovascular Maternal Aunt      Hypertension Other      Diabetes Other      Deep Vein Thrombosis (DVT) No family hx of        The complete review of systems is negative other than noted in the HPI or here.         Anesthesia Pre-Procedure Evaluation    Patient: Teresa Sanderson   MRN: [id number] : 1975        Preoperative Diagnosis: Carcinoma of left breast metastatic to bone (H) [C50.912, C79.51]   Procedure : Procedure(s):  BILATERAL SALPINGO-OOPHORECTOMY, LAPAROSCOPIC     Past Medical History:   Diagnosis Date     Anxiety      Breast CA (H) 2020     Depression      DVT (deep venous thrombosis) (H)      Left breast mass     x approximately 4-5 months     Pulmonary emboli (H)      Pyelonephritis      Schizoaffective disorder (H)      Tobacco use       Past Surgical History:   Procedure Laterality Date     IR LUMBAR KYPHOPLASTY VERTEBRAE  2021     TUBAL LIGATION        Allergies   Allergen Reactions     Contrast Dye      Pt developed nausea after isovue 370 injection on 21       Social History     Tobacco Use     Smoking status: Current Every Day Smoker     Packs/day: 0.25     Types: Cigarettes     Start date: 2011     Smokeless tobacco: Never Used   Substance Use Topics     Alcohol use: Not Currently     Comment:  occ      Wt Readings from Last 1 Encounters:   08/16/21 112.5 kg (248 lb)            ROS/MED HX  The complete review of systems is negative other than noted in the HPI or here.  Patient denies recent illness, fever and respiratory infection during past month.    ENT/Pulmonary: Comment: 5 pk yr hx, smokes 5 cigarettes per day. Smokes marijuana 2x/week.    (+) tobacco use, Current use,  (-) asthma and sleep apnea   Neurologic:     (+) peripheral neuropathy, - RLE. migraines,  (-) no seizures and no CVA   Cardiovascular: Comment: She was seen in the ED on 8/10 for right chest wall pain. EKG, troponin, CT PE rule out were all normal.  Unclear etiology seems to be an element of hyper analgesia.      METS/Exercise Tolerance: 1 - Eating, dressing Comment: Unable to walk one block due to back & bone pain, SOB, fatigue.   Hematologic: Comments: DVT/PE in 2014. On Xarelto    (+) History of blood clots, pt is anticoagulated, anemia,  (-) history of blood transfusion   Musculoskeletal: Comment: Mets to bone, s/p lumbar kyphoplasty 5/2021 (L4).     Chronic pain          GI/Hepatic:  - neg GI/hepatic ROS  (-) GERD and liver disease   Renal/Genitourinary:  - neg Renal ROS     Endo: Comment: Had steroids w/ chemo tx      (+) Obesity,  (-) Type II DM   Psychiatric/Substance Use:     (+) psychiatric history anxiety and depression     Infectious Disease:  - neg infectious disease ROS     Malignancy:   (+) Malignancy, History of Breast.Breast CA Active status post Chemo and Radiation.        Other:  - neg other ROS          Virtual visit -  No vitals were obtained       Physical Exam  Constitutional: Awake, alert, cooperative, no apparent distress, and appears stated age.  Neurologic: Awake, alert, oriented to name, place and time.   Neuropsychiatric: Calm, cooperative. Normal affect. Answers questions appropriately.    ** Patient's visit was done virtually today.  A full physical exam was not completed.  Please refer to the physical  examination documented by the anesthesiologist in the anesthesia record on the day of surgery. **        PRIOR LABS/DIAGNOSTIC STUDIES:   All labs and imaging personally reviewed         CBC:   Lab Results   Component Value Date    WBC 5.9 08/16/2021    WBC 5.2 08/10/2021    HGB 10.8 (L) 08/16/2021    HGB 10.5 (L) 08/10/2021    HCT 33.5 (L) 08/16/2021    HCT 32.0 (L) 08/10/2021     08/16/2021     08/10/2021     BMP:   Lab Results   Component Value Date     08/16/2021     08/10/2021    POTASSIUM 3.6 08/16/2021    POTASSIUM 3.7 08/10/2021    CHLORIDE 111 (H) 08/16/2021    CHLORIDE 107 08/10/2021    CO2 27 08/16/2021    CO2 25 08/10/2021    BUN 7 08/16/2021    BUN 13 08/10/2021    CR 0.74 08/16/2021    CR 0.77 08/10/2021     (H) 08/16/2021     (H) 08/10/2021     COAGS:   Lab Results   Component Value Date    PTT 29 12/06/2020    INR 1.03 05/17/2021    FIBR 546 (H) 12/09/2020     POC:   Lab Results   Component Value Date     (H) 05/17/2021    HCG Negative 05/17/2021    HCGS Negative 08/10/2021     HEPATIC:   Lab Results   Component Value Date    ALBUMIN 3.4 08/16/2021    PROTTOTAL 7.3 08/16/2021    ALT 40 08/16/2021    AST 26 08/16/2021    ALKPHOS 100 08/16/2021    BILITOTAL 0.2 08/16/2021     OTHER:   Lab Results   Component Value Date    NANDO 8.9 08/16/2021    CRP 21.0 (H) 12/09/2020           The patient's records and results personally reviewed by this provider.         ASSESSMENT and PLAN  Teresa Sanderson is a 45 y/o female who presents for pre-operative H&P in preparation for BILATERAL SALPINGO-OOPHORECTOMY, LAPAROSCOPIC with Rory Lopez MD on 8/20/21 at [facility] for treatment of Carcinoma of left breast metastatic to bone.    Pt has had prior anesthetic.  No history of anesthetic complications.    She has the following specific operative considerations:   # FABIANO 1/8 = low risk  # VTE risk: 4.5%  # Risk of PONV score = 2.  If > 2,  anti-emetic intervention recommended.  # Anesthesia considerations:  Dentition in poor repair - use caution.  Evidence of class 3 malocclusion.  Refer to PAC assessment in anesthesia records    # On chronic opiates - taking MS Contin 30 mg at bedtime, oxycodone 5-10mg q 3h prn      CARDIAC: METS 1, Unable to walk one block due to back & bone pain, SOB, fatigue.      # RCRI : No serious cardiac risks.  Intermediate risk surgery. 0.4% risk of major adverse cardiac event.     #  Pt was seen in the ED on 8/10 for right chest wall pain. EKG, troponin, CT PE rule out were all normal.  Unclear etiology seems to be an element of hyper analgesia. Recently ran out of oxycodone; encouraged to stay compliant with MS contin & oxy.    PULMONARY:     # 5 pk yr hx, smokes 5 cigarettes per day. Smokes marijuana 2x/week.    # Remote hx asthma, hasn't used inhaler in 10 yrs      GI:     # Denies GERD    ENDO: BMI 34    # Had steroids w/ chemo tx    # No DM    HEME/IMMUNE:     # DVT/PE in 2014. On Xarelto, will hold 48h prior    # Breast cancer, s/p chemoradiation    ORTHO:     # Mets to bone, s/p lumbar kyphoplasty 5/2021 (L4).    #  Chronic pain      Patient is optimized and is acceptable candidate for the proposed procedure. No further diagnostic evaluation is needed.    ** Patient's visit was done virtually today.  A full physical exam was not completed.  Please refer to the physical examination documented by the anesthesiologist in the anesthesia record on the day of surgery. **    Final plan per anesthesiologist on day of surgery.     Arrival time, NPO, shower and medication instructions provided by nursing staff today.  Preparing For Your Surgery handout given.        On the day of service:     Prep time: 15 minutes  Visit time: 18 minutes  Documentation time: 12 minutes  ------------------------------------------  Total time: 45 minutes      Alfreda Colvin PA-C  Preoperative Assessment Center  [facility]  [facility]  Phone: [phone]  Fax: [phone]

## 2021-08-17 NOTE — PATIENT INSTRUCTIONS
Preparing for Your Surgery      Name:  Teresa Sanderson   MRN:  [id number]   :  1975   Today's Date:  2021         Arriving for surgery:  Surgery date:  21  Arrival time:  5:45 am    Restrictions due to COVID 19:  One consistent visitor is allowed per patient  No ill visitors  All visitors must wear face mask     parking is available for anyone with mobility limitations or disabilities. (Monday- Friday 7 am- 5 pm)    Please come to:    [facility]  [address] [place] [id number]    Please check in on the 5th floor at the Ambulatory Surgery Center       What can I eat or drink?    -  You may eat and drink normally until 8 hours before surgery. (Until 11:00 pm)  -  You may have clear liquids up to 4 hours before surgery. (Until 3:00 am)    Examples of clear liquids:  Water  Clear broth  Juices (apple, white grape, white cranberry  and cider) without pulp  Noncarbonated, powder based beverages  (lemonade and Ephraim-Aid)  Sodas (Sprite, 7-Up, ginger ale and seltzer)  Coffee or tea (without milk or cream)  Gatorade    --No alcohol for at least 24 hours before surgery    Which medicines can I take?    Hold Aspirin for 7 days before surgery.   Hold Multivitamins for 7 days before surgery.  Hold Supplements for 7 days before surgery.    **Hold Ibuprofen (Advil, Motrin) for 1 day before surgery--unless otherwise directed by surgeon.**    Hold Naproxen (Aleve) for 4 days before surgery.    **Hold Rivaroxaban (Xarelto) 2 days before surgery - take your last dose on 21.**        -  DO NOT take the following medications the day of surgery:  Polyethylene Glycol (Miralax)  Senna-Docusate (Senna)  Nicotine (Nicorette) gum    -  PLEASE TAKE the following medications the day of surgery:  Acetaminophen (Tylenol)  Gabapentin (Neurontin)  Methocarbamol (Robaxin)  Ondansetron (Zofran)  Pantoprazole (Protonix)  Prochlorperazine (Compazine)  Sertraline (Zoloft)  Oxycodone  (Roxicodone)      How do I prepare myself?  - Please take 2 showers before surgery using Scrubcare or Hibiclens soap.    Use this soap only from the neck to your toes.     Leave the soap on your skin for one minute--then rinse thoroughly.      You may use your own shampoo and conditioner; no other hair products.   - Please remove all jewelry and body piercings.  - No lotions, deodorants or fragrance.  - No makeup or fingernail polish.   - Bring your ID and insurance card.    -If you have a Deep Brain Stimulator, a Spinal Cord Stimulator or any implanted Neuro Device you must bring the remote to the Surgery Center         - All patients are required to have a Covid-19 test within 4 days of surgery/procedure.      -Patients will be contacted by the [facility] scheduling team within 1 week of surgery to make an appointment.      - Patients may call the Scheduling team at [phone] if they have not been scheduled within 4 days of  surgery.      ALL PATIENTS ARE REQUIRED TO HAVE A RESPONSIBLE ADULT TO DRIVE AND BE IN ATTENDANCE WITH THEM FOR 24 HOURS FOLLOWING SURGERY       Questions or Concerns:    -For questions regarding the day of surgery please contact the Ambulatory Surgery Center at [phone].    -If you have health changes between today and your surgery please contact your surgeon.     For questions after surgery please call your surgeons office.

## 2021-08-17 NOTE — ANESTHESIA PREPROCEDURE EVALUATION
Anesthesia Pre-Procedure Evaluation    Patient: Teresa Sanderson   MRN: [id number] : 1975        Preoperative Diagnosis: Carcinoma of left breast metastatic to bone (H) [C50.912, C79.51]   Procedure : Procedure(s):  BILATERAL SALPINGO-OOPHORECTOMY, LAPAROSCOPIC     Past Medical History:   Diagnosis Date     Anxiety      Breast CA (H) 2020     Depression      DVT (deep venous thrombosis) (H)      Left breast mass     x approximately 4-5 months     Pulmonary emboli (H)      Pyelonephritis      Schizoaffective disorder (H)      Tobacco use       Past Surgical History:   Procedure Laterality Date     IR LUMBAR KYPHOPLASTY VERTEBRAE  2021     TUBAL LIGATION        Allergies   Allergen Reactions     Contrast Dye      Pt developed nausea after isovue 370 injection on 21       Social History     Tobacco Use     Smoking status: Current Every Day Smoker     Packs/day: 0.25     Types: Cigarettes     Start date: 2011     Smokeless tobacco: Never Used   Substance Use Topics     Alcohol use: Not Currently     Comment: occ      Wt Readings from Last 1 Encounters:   21 112.5 kg (248 lb)        Anesthesia Evaluation   Pt has had prior anesthetic.     No history of anesthetic complications       ROS/MED HX  ENT/Pulmonary: Comment: 5 pk yr hx, smokes 5 cigarettes per day. Smokes marijuana 2x/week.    (+) tobacco use, Current use,  (-) asthma and sleep apnea   Neurologic:     (+) peripheral neuropathy, - RLE. migraines,  (-) no seizures and no CVA   Cardiovascular: Comment: She was seen in the ED on 8/10 for right chest wall pain. EKG, troponin, CT PE rule out were all normal.  Unclear etiology seems to be an element of hyper analgesia.      METS/Exercise Tolerance: 1 - Eating, dressing Comment: Unable to walk one block due to back & bone pain, SOB, fatigue.   Hematologic: Comments: DVT/PE in 2014. On Xarelto    (+) History of blood clots, pt is anticoagulated, anemia,  (-) history of blood  transfusion   Musculoskeletal: Comment: Mets to bone, s/p lumbar kyphoplasty 5/2021 (L4).     Chronic pain          GI/Hepatic:  - neg GI/hepatic ROS  (-) GERD and liver disease   Renal/Genitourinary:  - neg Renal ROS     Endo: Comment: Had steroids w/ chemo tx      (+) Obesity,  (-) Type II DM   Psychiatric/Substance Use:     (+) psychiatric history anxiety and depression     Infectious Disease:  - neg infectious disease ROS     Malignancy:   (+) Malignancy, History of Breast.Breast CA Active status post Chemo and Radiation.        Other:  - neg other ROS          Physical Exam    Airway        Mallampati: II   TM distance: > 3 FB   Neck ROM: full   Mouth opening: > 3 cm    Respiratory Devices and Support         Dental     Comment: Poor dentition        Cardiovascular   cardiovascular exam normal          Pulmonary   pulmonary exam normal                OUTSIDE LABS:  CBC:   Lab Results   Component Value Date    WBC 5.9 08/16/2021    WBC 5.2 08/10/2021    HGB 10.8 (L) 08/16/2021    HGB 10.5 (L) 08/10/2021    HCT 33.5 (L) 08/16/2021    HCT 32.0 (L) 08/10/2021     08/16/2021     08/10/2021     BMP:   Lab Results   Component Value Date     08/16/2021     08/10/2021    POTASSIUM 3.6 08/16/2021    POTASSIUM 3.7 08/10/2021    CHLORIDE 111 (H) 08/16/2021    CHLORIDE 107 08/10/2021    CO2 27 08/16/2021    CO2 25 08/10/2021    BUN 7 08/16/2021    BUN 13 08/10/2021    CR 0.74 08/16/2021    CR 0.77 08/10/2021     (H) 08/16/2021     (H) 08/10/2021     COAGS:   Lab Results   Component Value Date    PTT 29 12/06/2020    INR 1.03 05/17/2021    FIBR 546 (H) 12/09/2020     POC:   Lab Results   Component Value Date     (H) 05/17/2021    HCG Negative 05/17/2021    HCGS Negative 08/10/2021     HEPATIC:   Lab Results   Component Value Date    ALBUMIN 3.4 08/16/2021    PROTTOTAL 7.3 08/16/2021    ALT 40 08/16/2021    AST 26 08/16/2021    ALKPHOS 100 08/16/2021    BILITOTAL 0.2 08/16/2021      OTHER:   Lab Results   Component Value Date    NANDO 8.9 08/16/2021    CRP 21.0 (H) 12/09/2020       Anesthesia Plan    ASA Status:  3   NPO Status:  NPO Appropriate    Anesthesia Type: General.     - Airway: ETT   Induction: Intravenous.   Maintenance: Balanced.        Consents    Anesthesia Plan(s) and associated risks, benefits, and realistic alternatives discussed. Questions answered and patient/representative(s) expressed understanding.     - Discussed with:  Patient         Postoperative Care    Pain management: IV analgesics, Oral pain medications, Multi-modal analgesia.   PONV prophylaxis: Background Propofol Infusion, Dexamethasone or Solumedrol, Ondansetron (or other 5HT-3)     Comments:              PAC Discussion and Assessment    ASA Classification: 3  Case is suitable for: ASC  Anesthetic techniques and relevant risks discussed: GA  Invasive monitoring and risk discussed: No    Possibility and Risk of blood transfusion discussed: No            PAC Resident/NP Anesthesia Assessment: Teresa Sanderson is a 47 y/o female who presents for pre-operative H&P in preparation for BILATERAL SALPINGO-OOPHORECTOMY, LAPAROSCOPIC with Rory Lopez MD on 8/20/21 at [facility] [place] for treatment of Carcinoma of left breast metastatic to bone.    Pt has had prior anesthetic.  No history of anesthetic complications.    She has the following specific operative considerations:   # FABIANO 1/8 = low risk  # VTE risk: 4.5%  # Risk of PONV score = 2.  If > 2, anti-emetic intervention recommended.  # Anesthesia considerations:  Dentition in poor repair - use caution.  Evidence of class 3 malocclusion.  Refer to PAC assessment in anesthesia records    # On chronic opiates - taking MS Contin 30 mg at bedtime, oxycodone 5-10mg q 3h prn      CARDIAC: METS 1, Unable to walk one block due to back & bone pain, SOB, fatigue.      # RCRI : No serious cardiac risks.  Intermediate risk surgery. 0.4% risk of major  adverse cardiac event.     #  Pt was seen in the ED on 8/10 for right chest wall pain. EKG, troponin, CT PE rule out were all normal.  Unclear etiology seems to be an element of hyper analgesia. Recently ran out of oxycodone; encouraged to stay compliant with MS contin & oxy.    PULMONARY:     # 5 pk yr hx, smokes 5 cigarettes per day. Smokes marijuana 2x/week.    # Remote hx asthma, hasn't used inhaler in 10 yrs      GI:     # Denies GERD    ENDO: BMI 34    # Had steroids w/ chemo tx    # No DM    HEME/IMMUNE:     # DVT/PE in 2014. On Xarelto, will hold 48h prior    # Breast cancer, s/p chemoradiation    ORTHO:     # Mets to bone, s/p lumbar kyphoplasty 5/2021 (L4).    #  Chronic pain      Patient is optimized and is acceptable candidate for the proposed procedure. No further diagnostic evaluation is needed.    ** Patient's visit was done virtually today.  A full physical exam was not completed.  Please refer to the physical examination documented by the anesthesiologist in the anesthesia record on the day of surgery. **    Final plan per anesthesiologist on day of surgery.       Reviewed and Signed by PAC Mid-Level Provider/Resident  Mid-Level Provider/Resident: Alfreda Colvin PA-C  Date: 8/17/21  Time: 1222                               Alfreda Colvin PA-C

## 2021-08-17 NOTE — H&P
Pre-Operative H & P     CC:  Preoperative exam to assess for increased cardiopulmonary risk while undergoing surgery and anesthesia.    Date of Encounter: 8/17/2021  Primary Care Physician:  No Ref-Primary, Physician     Reason for Visit: Carcinoma of left breast metastatic to bone (H)    Video-Visit Details    Type of service:  Video Visit    Patient verbally consented to video service today: YES      Video Start Time: 1127  Video End Time (time video stopped): 1145    Originating Location (pt. Location): Other (in parked vehicle)    Distant Location (provider location):  [facility] PREOPERATIVE [facility]     Mode of Communication:  Video Conference via [facility]    hospitals  Teresa Sanderson is a 47 y/o female who presents for pre-operative H&P in preparation for BILATERAL SALPINGO-OOPHORECTOMY, LAPAROSCOPIC with Rory Lopez MD on 8/20/21 at [facility] for treatment of Carcinoma of left breast metastatic to bone (H). Patient is being evaluated for comorbid conditions of tobacco dependence, h/o DVT/PE, anticoagulated, GERD, anxiety, depression, migraines, hot flashes, dental abscesses.    Ms. Sanderson has a history of ER/NV positive, HER2 neg Stage IV breast cancer with bone metastases. She is on treatment with Ibrance, zoladex, and anastrozole. She now presents for the above procedure.    She was seen in the ED on 8/10 for right chest wall pain. EKG, troponin, CT PE rule out were all normal.  Unclear etiology seems to be an element of hyper analgesia as she has pain with light touch. Continue gabapentin dosing to 600 mg PO TID.  Continue methocarbamol, lidocaine patches, and heat prn. She ran out of oxycodone several days prior. Discussed staying compliant with oxycodone 5mg q6, and MS contin 15mg at bedtime. Has a follow up appointment with palliative care.    History was obtained from patient & chart review.     Hx of abnormal bleeding or anti-platelet use: on Xarelto    Menstrual  history: No LMP recorded. Patient has had an injection.:      Prior to Admission Medications  Current Outpatient Medications   Medication Sig Dispense Refill     acetaminophen (TYLENOL) 500 MG tablet Take 1,000 mg by mouth every 8 hours as needed for mild pain       anastrozole (ARIMIDEX) 1 MG tablet Take 1 tablet (1 mg) by mouth daily for 28 days (Patient taking differently: Take 1 mg by mouth every evening ) 28 tablet 0     doxepin (SINEQUAN) 10 MG capsule Take 10 mg by mouth At Bedtime       gabapentin (NEURONTIN) 300 MG capsule Take 2 capsules (600 mg) by mouth every morning AND 2 capsules (600 mg) daily at 2 pm AND 2 capsules (600 mg) At Bedtime. 180 capsule 0     ibuprofen (ADVIL/MOTRIN) 200 MG tablet Take 600 mg by mouth every 8 hours as needed for mild pain       methocarbamol (ROBAXIN) 500 MG tablet Take 1 tablet (500 mg) by mouth 4 times daily 120 tablet 0     naloxone (NARCAN) 4 MG/0.1ML nasal spray Spray 1 spray (4 mg) into one nostril alternating nostrils once as needed for opioid reversal every 2-3 minutes until assistance arrives 0.2 mL 0     ondansetron (ZOFRAN-ODT) 4 MG ODT tab Take 1 tablet (4 mg) by mouth every 6 hours as needed for nausea or vomiting 120 tablet 0     pantoprazole (PROTONIX) 40 MG EC tablet Take 1 tablet (40 mg) by mouth every morning (before breakfast) 30 tablet 1     polyethylene glycol (MIRALAX) 17 GM/Dose powder Take 17 g by mouth daily as needed for constipation 578 g 3     prochlorperazine (COMPAZINE) 10 MG tablet Take 1 tablet (10 mg) by mouth every 6 hours as needed (Nausea/Vomiting) 30 tablet 2     rivaroxaban ANTICOAGULANT (XARELTO) 20 MG TABS tablet Take 1 tablet (20 mg) by mouth daily (with dinner) 30 tablet 11     SENNA-docusate sodium (SENNA S) 8.6-50 MG tablet Take 2 tablets by mouth 2 times daily as needed (Constipation) 100 tablet 3     sertraline (ZOLOFT) 50 MG tablet Take 2 tablets (100 mg) by mouth daily 60 tablet 1     morphine (MS CONTIN) 15 MG CR tablet Take  1 tablet (15 mg) by mouth At Bedtime 30 tablet 0     nicotine (NICORETTE) 2 MG gum Place 1 each (2 mg) inside cheek every hour as needed for smoking cessation 90 each 1     oxyCODONE (ROXICODONE) 5 MG tablet Take 1 tablet (5 mg) by mouth every 6 hours as needed for moderate to severe pain 28 tablet 0     palbociclib (IBRANCE) 125 MG tablet Take 1 tablet (125 mg) by mouth daily Take for 21 days, then 7 days off. (Patient not taking: Reported on 2021) 21 tablet 0       Family History  Family History   Problem Relation Age of Onset     Lung Cancer Mother      Lung Cancer Father      Lupus Brother      Kidney Disease Brother      Diabetes Daughter      Asthma Son      Cardiovascular Maternal Aunt      Hypertension Other      Diabetes Other      Deep Vein Thrombosis (DVT) No family hx of        The complete review of systems is negative other than noted in the HPI or here.         Anesthesia Pre-Procedure Evaluation    Patient: Teresa Sanderson   MRN: [id number] : 1975        Preoperative Diagnosis: Carcinoma of left breast metastatic to bone (H) [C50.912, C79.51]   Procedure : Procedure(s):  BILATERAL SALPINGO-OOPHORECTOMY, LAPAROSCOPIC     Past Medical History:   Diagnosis Date     Anxiety      Breast CA (H) 2020     Depression      DVT (deep venous thrombosis) (H)      Left breast mass     x approximately 4-5 months     Pulmonary emboli (H)      Pyelonephritis      Schizoaffective disorder (H)      Tobacco use       Past Surgical History:   Procedure Laterality Date     IR LUMBAR KYPHOPLASTY VERTEBRAE  2021     TUBAL LIGATION        Allergies   Allergen Reactions     Contrast Dye      Pt developed nausea after isovue 370 injection on 21       Social History     Tobacco Use     Smoking status: Current Every Day Smoker     Packs/day: 0.25     Types: Cigarettes     Start date: 2011     Smokeless tobacco: Never Used   Substance Use Topics     Alcohol use: Not Currently     Comment:  occ      Wt Readings from Last 1 Encounters:   08/16/21 112.5 kg (248 lb)            ROS/MED HX  The complete review of systems is negative other than noted in the HPI or here.  Patient denies recent illness, fever and respiratory infection during past month.    ENT/Pulmonary: Comment: 5 pk yr hx, smokes 5 cigarettes per day. Smokes marijuana 2x/week.    (+) tobacco use, Current use,  (-) asthma and sleep apnea   Neurologic:     (+) peripheral neuropathy, - RLE. migraines,  (-) no seizures and no CVA   Cardiovascular: Comment: She was seen in the ED on 8/10 for right chest wall pain. EKG, troponin, CT PE rule out were all normal.  Unclear etiology seems to be an element of hyper analgesia.      METS/Exercise Tolerance: 1 - Eating, dressing Comment: Unable to walk one block due to back & bone pain, SOB, fatigue.   Hematologic: Comments: DVT/PE in 2014. On Xarelto    (+) History of blood clots, pt is anticoagulated, anemia,  (-) history of blood transfusion   Musculoskeletal: Comment: Mets to bone, s/p lumbar kyphoplasty 5/2021 (L4).     Chronic pain          GI/Hepatic:  - neg GI/hepatic ROS  (-) GERD and liver disease   Renal/Genitourinary:  - neg Renal ROS     Endo: Comment: Had steroids w/ chemo tx      (+) Obesity,  (-) Type II DM   Psychiatric/Substance Use:     (+) psychiatric history anxiety and depression     Infectious Disease:  - neg infectious disease ROS     Malignancy:   (+) Malignancy, History of Breast.Breast CA Active status post Chemo and Radiation.        Other:  - neg other ROS          Virtual visit -  No vitals were obtained       Physical Exam  Constitutional: Awake, alert, cooperative, no apparent distress, and appears stated age.  Neurologic: Awake, alert, oriented to name, place and time.   Neuropsychiatric: Calm, cooperative. Normal affect. Answers questions appropriately.    ** Patient's visit was done virtually today.  A full physical exam was not completed.  Please refer to the physical  examination documented by the anesthesiologist in the anesthesia record on the day of surgery. **        PRIOR LABS/DIAGNOSTIC STUDIES:   All labs and imaging personally reviewed         CBC:   Lab Results   Component Value Date    WBC 5.9 08/16/2021    WBC 5.2 08/10/2021    HGB 10.8 (L) 08/16/2021    HGB 10.5 (L) 08/10/2021    HCT 33.5 (L) 08/16/2021    HCT 32.0 (L) 08/10/2021     08/16/2021     08/10/2021     BMP:   Lab Results   Component Value Date     08/16/2021     08/10/2021    POTASSIUM 3.6 08/16/2021    POTASSIUM 3.7 08/10/2021    CHLORIDE 111 (H) 08/16/2021    CHLORIDE 107 08/10/2021    CO2 27 08/16/2021    CO2 25 08/10/2021    BUN 7 08/16/2021    BUN 13 08/10/2021    CR 0.74 08/16/2021    CR 0.77 08/10/2021     (H) 08/16/2021     (H) 08/10/2021     COAGS:   Lab Results   Component Value Date    PTT 29 12/06/2020    INR 1.03 05/17/2021    FIBR 546 (H) 12/09/2020     POC:   Lab Results   Component Value Date     (H) 05/17/2021    HCG Negative 05/17/2021    HCGS Negative 08/10/2021     HEPATIC:   Lab Results   Component Value Date    ALBUMIN 3.4 08/16/2021    PROTTOTAL 7.3 08/16/2021    ALT 40 08/16/2021    AST 26 08/16/2021    ALKPHOS 100 08/16/2021    BILITOTAL 0.2 08/16/2021     OTHER:   Lab Results   Component Value Date    NANDO 8.9 08/16/2021    CRP 21.0 (H) 12/09/2020           The patient's records and results personally reviewed by this provider.         ASSESSMENT and PLAN  Teresa Sanderson is a 47 y/o female who presents for pre-operative H&P in preparation for BILATERAL SALPINGO-OOPHORECTOMY, LAPAROSCOPIC with Rory Lopez MD on 8/20/21 at [facility] for treatment of Carcinoma of left breast metastatic to bone.    Pt has had prior anesthetic.  No history of anesthetic complications.    She has the following specific operative considerations:   # FABIANO 1/8 = low risk  # VTE risk: 4.5%  # Risk of PONV score = 2.  If > 2,  anti-emetic intervention recommended.  # Anesthesia considerations:  Dentition in poor repair - use caution.  Evidence of class 3 malocclusion.  Refer to PAC assessment in anesthesia records    # On chronic opiates - taking MS Contin 30 mg at bedtime, oxycodone 5-10mg q 3h prn      CARDIAC: METS 1, Unable to walk one block due to back & bone pain, SOB, fatigue.      # RCRI : No serious cardiac risks.  Intermediate risk surgery. 0.4% risk of major adverse cardiac event.     #  Pt was seen in the ED on 8/10 for right chest wall pain. EKG, troponin, CT PE rule out were all normal.  Unclear etiology seems to be an element of hyper analgesia. Recently ran out of oxycodone; encouraged to stay compliant with MS contin & oxy.    PULMONARY:     # 5 pk yr hx, smokes 5 cigarettes per day. Smokes marijuana 2x/week.    # Remote hx asthma, hasn't used inhaler in 10 yrs      GI:     # Denies GERD    ENDO: BMI 34    # Had steroids w/ chemo tx    # No DM    HEME/IMMUNE:     # DVT/PE in 2014. On Xarelto, will hold 48h prior    # Breast cancer, s/p chemoradiation    ORTHO:     # Mets to bone, s/p lumbar kyphoplasty 5/2021 (L4).    #  Chronic pain      Patient is optimized and is acceptable candidate for the proposed procedure. No further diagnostic evaluation is needed.    ** Patient's visit was done virtually today.  A full physical exam was not completed.  Please refer to the physical examination documented by the anesthesiologist in the anesthesia record on the day of surgery. **    Final plan per anesthesiologist on day of surgery.     Arrival time, NPO, shower and medication instructions provided by nursing staff today.  Preparing For Your Surgery handout given.        On the day of service:     Prep time: 15 minutes  Visit time: 18 minutes  Documentation time: 12 minutes  ------------------------------------------  Total time: 45 minutes      Alfreda Colvin PA-C  Preoperative Assessment Center  [facility]  [place]  [facility]  Phone: [phone]  Fax: [phone]

## 2021-08-19 ENCOUNTER — VIRTUAL VISIT (OUTPATIENT)
Dept: PALLIATIVE CARE | Facility: CLINIC | Age: 46
End: 2021-08-19
Attending: INTERNAL MEDICINE
Payer: COMMERCIAL

## 2021-08-19 DIAGNOSIS — C50.912 CARCINOMA OF LEFT BREAST METASTATIC TO BONE (H): ICD-10-CM

## 2021-08-19 DIAGNOSIS — C79.51 CARCINOMA OF LEFT BREAST METASTATIC TO BONE (H): ICD-10-CM

## 2021-08-19 DIAGNOSIS — G89.3 CANCER ASSOCIATED PAIN: ICD-10-CM

## 2021-08-19 PROCEDURE — 99214 OFFICE O/P EST MOD 30 MIN: CPT | Mod: GC | Performed by: STUDENT IN AN ORGANIZED HEALTH CARE EDUCATION/TRAINING PROGRAM

## 2021-08-19 RX ORDER — OXYCODONE HYDROCHLORIDE 5 MG/1
5-10 TABLET ORAL EVERY 6 HOURS PRN
Qty: 90 TABLET | Refills: 0 | Status: SHIPPED | OUTPATIENT
Start: 2021-08-19 | End: 2021-10-06

## 2021-08-19 RX ORDER — MORPHINE SULFATE 15 MG/1
15 TABLET, FILM COATED, EXTENDED RELEASE ORAL EVERY 12 HOURS
Qty: 60 TABLET | Refills: 0 | Status: SHIPPED | OUTPATIENT
Start: 2021-08-19 | End: 2021-10-06

## 2021-08-19 NOTE — PROGRESS NOTES
Teresa is a 46 year old who is being evaluated via a billable video visit.      How would you like to obtain your AVS? MyChart  If the video visit is dropped, the invitation should be resent by: Text to cell phone: [phone]  Will anyone else be joining your video visit? No       LILIBETH Haas      Video Start Time:  14:14  Video-Visit Details    Type of service:  Video Visit    Video End Time:14:46    Originating Location (pt. Location): Home    Distant Location (provider location):  [facility]       Palliative Care Outpatient Clinic Consultation Note    Patient:  Teresa Sanderson    Chief Complaint:   Teresa Sanderson 46 year old female who is presenting to the palliative medicine clinic today at the request of oncology and primary palliative provider for a palliative care consultation secondary to pain management.   The patient's primary care provider is:  No Ref-Primary, Physician.     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      It appears as though she has been unable to make recent palliative appointments, she attributes this to lack of transportation and inquires as to whether social work can help with future transportation arrangements.      Her only physical complaint is right sided back pain, records confirm she was seen in the department 8/10/2021 for similar complaint but CT imaging failed to identify pulmonary embolism or other acute pathology.  She has been taking 30 mg MS Contin at night as well as 5-10 mg oxycodone 3-5 times daily.    Patient's Disease  Understanding: adequate     Coping:  Well with support from family    Social History  Living Situation: with family including daughter  Children: 5  Actual/Potential Caregiver(s): daughter   Support System: family  Spiritual Concerns/Needs: says bassam is being tested right now  Social History     Tobacco Use     Smoking status: Current Every Day Smoker     Packs/day: 0.25     Types: Cigarettes     Start date: 5/13/2011     Smokeless tobacco: Never Used   Substance Use Topics     Alcohol use: Not Currently     Comment: occ     Drug use: Yes     Types: Marijuana     Comment: occ       Family History  Family History   Problem Relation Age of Onset     Lung Cancer Mother      Lung Cancer Father      Lupus Brother      Kidney Disease Brother      Diabetes Daughter      Asthma Son      Cardiovascular Maternal Aunt      Hypertension Other      Diabetes Other      Deep Vein Thrombosis (DVT) No family hx of        Advance Care Planning:  Advance Directive:    No  POLST:   Yes, Full Code     Allergies   Allergen Reactions     Contrast Dye      Pt developed nausea after isovue 370 injection on 6/9/21      Current Outpatient Medications   Medication Sig Dispense Refill     acetaminophen (TYLENOL) 500 MG tablet Take 1,000 mg by mouth every 8 hours as needed for mild pain       anastrozole (ARIMIDEX) 1 MG tablet Take 1 tablet (1 mg) by mouth daily for 28 days (Patient taking differently: Take 1 mg by mouth every evening ) 28 tablet 0     doxepin (SINEQUAN) 10 MG capsule Take 10 mg by mouth At Bedtime       gabapentin (NEURONTIN) 300 MG capsule Take 2 capsules (600 mg) by mouth every morning AND 2 capsules (600 mg) daily at 2 pm AND 2 capsules (600 mg) At Bedtime. 180 capsule 0     ibuprofen (ADVIL/MOTRIN) 200 MG tablet Take 600 mg by mouth every 8 hours as needed for mild pain       methocarbamol (ROBAXIN) 500 MG tablet Take 1 tablet (500 mg) by mouth 4 times daily 120 tablet 0     morphine (MS CONTIN) 15 MG CR tablet Take 1  tablet (15 mg) by mouth At Bedtime 30 tablet 0     naloxone (NARCAN) 4 MG/0.1ML nasal spray Spray 1 spray (4 mg) into one nostril alternating nostrils once as needed for opioid reversal every 2-3 minutes until assistance arrives 0.2 mL 0     nicotine (NICORETTE) 2 MG gum Place 1 each (2 mg) inside cheek every hour as needed for smoking cessation 90 each 1     ondansetron (ZOFRAN-ODT) 4 MG ODT tab Take 1 tablet (4 mg) by mouth every 6 hours as needed for nausea or vomiting 120 tablet 0     oxyCODONE (ROXICODONE) 5 MG tablet Take 1 tablet (5 mg) by mouth every 6 hours as needed for moderate to severe pain 28 tablet 0     pantoprazole (PROTONIX) 40 MG EC tablet Take 1 tablet (40 mg) by mouth every morning (before breakfast) 30 tablet 1     polyethylene glycol (MIRALAX) 17 GM/Dose powder Take 17 g by mouth daily as needed for constipation 578 g 3     prochlorperazine (COMPAZINE) 10 MG tablet Take 1 tablet (10 mg) by mouth every 6 hours as needed (Nausea/Vomiting) 30 tablet 2     rivaroxaban ANTICOAGULANT (XARELTO) 20 MG TABS tablet Take 1 tablet (20 mg) by mouth daily (with dinner) 30 tablet 11     SENNA-docusate sodium (SENNA S) 8.6-50 MG tablet Take 2 tablets by mouth 2 times daily as needed (Constipation) 100 tablet 3     sertraline (ZOLOFT) 50 MG tablet Take 2 tablets (100 mg) by mouth daily 60 tablet 1     palbociclib (IBRANCE) 125 MG tablet Take 1 tablet (125 mg) by mouth daily Take for 21 days, then 7 days off. (Patient not taking: Reported on 8/13/2021) 21 tablet 0     Past Medical History:   Diagnosis Date     Anxiety      Breast CA (H) 12/2020     Depression      DVT (deep venous thrombosis) (H) 2014     Left breast mass     x approximately 4-5 months     Pulmonary emboli (H)      Pyelonephritis      Schizoaffective disorder (H)      Tobacco use      Past Surgical History:   Procedure Laterality Date     IR LUMBAR KYPHOPLASTY VERTEBRAE  5/17/2021     TUBAL LIGATION  1998       Palliative Symptom Review (0=no  symptom/no concern, 1=mild, 2=moderate, 3=severe):      Pain: 3      Fatigue: 1      Nausea: 0      Constipation: 0      Diarrhea: 0      Depressive Symptoms: 0      Anxiety: 0      Drowsiness: 0      Poor Appetite: 0      Shortness of Breath: 0      Insomnia: 1      Other: 0      Overall (0 good/no concerns, 3 very poor):  5    No vitals due to video visit.     GENERAL: Comfortable-appearing, alert and no distress  EYES: Eyes grossly normal to inspection, conjunctivae and sclerae normal.  RESP: No increased work of breathing.  Able to speak easily in complete sentences.  SKIN: no suspicious lesions or rashes on exposed skin  NEURO: Cranial nerves grossly intact, mentation intact and speech normal.    PSYCH: mentation appears normal, affect normal/bright and mood-congruent, judgement and insight intact, normal speech and appearance     Wt Readings from Last 10 Encounters:   08/16/21 112.5 kg (248 lb)   08/10/21 114.8 kg (253 lb)   08/05/21 114.8 kg (253 lb)   07/19/21 111.5 kg (245 lb 12.8 oz)   06/23/21 115.3 kg (254 lb 1.6 oz)   05/27/21 115.9 kg (255 lb 9.6 oz)   05/17/21 116.1 kg (256 lb)   05/13/21 121.1 kg (267 lb)   04/20/21 117.9 kg (260 lb)   03/25/21 113.5 kg (250 lb 3.2 oz)       Data Reviewed:  LABS:   Recent Labs   Lab Test 08/16/21  1258 08/10/21  0808    140   POTASSIUM 3.6 3.7   CHLORIDE 111* 107   CO2 27 25   ANIONGAP 5 8   * 102*   BUN 7 13   CR 0.74 0.77   NANDO 8.9 8.7       ORT Score = 6, due to age, family hx of drug use, and schizoaffective hx    Impressions, Recommendations & Counseling:    Teresa Sanderson is a 46 year old with past medical history significant for left-sided breast cancer with metastasis to bone status post radiation therapy to spine, kyphoplasty L4, and  current treatment regimen including Ibrance, zoladex, and anastrozole evaluated in palliative care clinic via video for pain management. She has been unable to most recent visits due to transportation issues and  is requesting assistance.      It looks as though her MS Contin was recently decreased from 30 mg twice daily to 15 mg nightly, suspect this is related to the appearance of complete response via most recent PET scan.  However patient has been taking 30 mg MS Contin at night in addition to 5-10 mg oxycodone 3-5 times daily.  She complains that the back pain and chest pain in particular keep her up at night, but relatively well managed throughout the day.      Encouraged her to take medications only as directed, we will resume MS Contin dosing every 12 hours at 15 mg daily with 5-10 mg oxycodone 3 times daily as needed until follow-up can be arranged over the next 3-4 weeks.      She is also scheduled for laparoscopic bilateral salpingo-oophorectomy tomorrow and may need an additional short course of analgesics postoperatively but that will be deferred to surgical team.  Otherwise she says physical symptoms are adequately managed and her emotional state is doing well at this time.    Cam Rodríguez, DO  Palliative Medicine Fellow    (This note was transcribed using voice recognition software. While I review and edit the transcription, I may miss errors, and the software sometimes does unexpected capitalizations and formatting that I miss. Please let me know of any serious mistranscriptions and I will addend this note.)    Attending attestation:   Patient seen and evaluated with Dr. Rogers and I agree with findings and recs in this note.   Has had difficulty with following up with appointments due to lack of transportation - will make SW referral.    Had great response on last scan, and MS contin reduced to 15 mg BID, but she is currently taking 30 mg at night only.  Will change back to 15 mg BID, continue PRN oxycodone and plan for close follow-up.  Unclear to me the course of her pain, and if this is worsening back pain despite improvement in cancer?  If still having ongoing pain despite improvement, may benefit from  titrating adjuvants also.   Thank you for involving us in the patient's care.   Abby Chi MD / Palliative Medicine / Pager [phone] / After-Hours Answering Service [phone] / Main Palliative Clinic - [facility] [phone] / [facility] Inpatient Team Consult Pager [phone] (answered 8am-430pm M-F)

## 2021-08-19 NOTE — PATIENT INSTRUCTIONS
Thank you for seeing the Palliative Care Clinic today.      1. Take the morphine (MS Contin) 15 mg twice/day.  You can take the oxycodone 5-10 mg every 6 hours as needed in addition to this.     Return in clinic in 3-4 weeks for a follow-up.      You can reach the Palliative Care Team during business hours at the following numbers:   -Call [phone] to reach the palliative care team for questions     To reach the Palliative Care Provider on-call After-hours or on holidays and weekends, call: [phone].  Please note that we are not able to provide pain medication refills on evenings or weekends.

## 2021-08-19 NOTE — LETTER
8/19/2021       RE: Teresa Sanderson  [address] [facility] [address]     Dear Colleague,    Thank you for referring your patient, Teresa Sanderson, to the [facility] CANCER CLINIC at [facility]. Please see a copy of my visit note below.    Palliative Care Outpatient Clinic Consultation Note    Patient:  Teresa Sanderson    Chief Complaint:   Teresa Sanderson 46 year old female who is presenting to the palliative medicine clinic today at the request of oncology and primary palliative provider for a palliative care consultation secondary to pain management.   The patient's primary care provider is:  No Ref-Primary, Physician.     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      It appears as though she has been unable to make recent palliative appointments, she attributes this to lack of transportation and inquires as to whether social work can help with future transportation arrangements.      Her only physical complaint is right sided back pain, records confirm she was seen in the department 8/10/2021 for similar complaint but CT imaging failed to identify pulmonary embolism or other acute pathology.  She has been taking 30 mg MS Contin at night as well as 5-10 mg oxycodone 3-5 times daily.    Patient's Disease Understanding: adequate     Coping:  Well with support from family    Social History  Living Situation: with family including daughter  Children: 5  Actual/Potential Caregiver(s): daughter   Support System: family  Spiritual Concerns/Needs:  says bassam is being tested right now  Social History     Tobacco Use     Smoking status: Current Every Day Smoker     Packs/day: 0.25     Types: Cigarettes     Start date: 5/13/2011     Smokeless tobacco: Never Used   Substance Use Topics     Alcohol use: Not Currently     Comment: occ     Drug use: Yes     Types: Marijuana     Comment: occ       Family History  Family History   Problem Relation Age of Onset     Lung Cancer Mother      Lung Cancer Father      Lupus Brother      Kidney Disease Brother      Diabetes Daughter      Asthma Son      Cardiovascular Maternal Aunt      Hypertension Other      Diabetes Other      Deep Vein Thrombosis (DVT) No family hx of        Advance Care Planning:  Advance Directive:    No  POLST:   Yes, Full Code     Allergies   Allergen Reactions     Contrast Dye      Pt developed nausea after isovue 370 injection on 6/9/21      Current Outpatient Medications   Medication Sig Dispense Refill     acetaminophen (TYLENOL) 500 MG tablet Take 1,000 mg by mouth every 8 hours as needed for mild pain       anastrozole (ARIMIDEX) 1 MG tablet Take 1 tablet (1 mg) by mouth daily for 28 days (Patient taking differently: Take 1 mg by mouth every evening ) 28 tablet 0     doxepin (SINEQUAN) 10 MG capsule Take 10 mg by mouth At Bedtime       gabapentin (NEURONTIN) 300 MG capsule Take 2 capsules (600 mg) by mouth every morning AND 2 capsules (600 mg) daily at 2 pm AND 2 capsules (600 mg) At Bedtime. 180 capsule 0     ibuprofen (ADVIL/MOTRIN) 200 MG tablet Take 600 mg by mouth every 8 hours as needed for mild pain       methocarbamol (ROBAXIN) 500 MG tablet Take 1 tablet (500 mg) by mouth 4 times daily 120 tablet 0     morphine (MS CONTIN) 15 MG CR tablet Take 1 tablet (15 mg) by mouth At Bedtime 30 tablet 0     naloxone (NARCAN) 4 MG/0.1ML nasal spray Spray 1 spray (4 mg) into one nostril alternating nostrils once as needed for opioid reversal every 2-3 minutes until assistance arrives 0.2 mL 0      nicotine (NICORETTE) 2 MG gum Place 1 each (2 mg) inside cheek every hour as needed for smoking cessation 90 each 1     ondansetron (ZOFRAN-ODT) 4 MG ODT tab Take 1 tablet (4 mg) by mouth every 6 hours as needed for nausea or vomiting 120 tablet 0     oxyCODONE (ROXICODONE) 5 MG tablet Take 1 tablet (5 mg) by mouth every 6 hours as needed for moderate to severe pain 28 tablet 0     pantoprazole (PROTONIX) 40 MG EC tablet Take 1 tablet (40 mg) by mouth every morning (before breakfast) 30 tablet 1     polyethylene glycol (MIRALAX) 17 GM/Dose powder Take 17 g by mouth daily as needed for constipation 578 g 3     prochlorperazine (COMPAZINE) 10 MG tablet Take 1 tablet (10 mg) by mouth every 6 hours as needed (Nausea/Vomiting) 30 tablet 2     rivaroxaban ANTICOAGULANT (XARELTO) 20 MG TABS tablet Take 1 tablet (20 mg) by mouth daily (with dinner) 30 tablet 11     SENNA-docusate sodium (SENNA S) 8.6-50 MG tablet Take 2 tablets by mouth 2 times daily as needed (Constipation) 100 tablet 3     sertraline (ZOLOFT) 50 MG tablet Take 2 tablets (100 mg) by mouth daily 60 tablet 1     palbociclib (IBRANCE) 125 MG tablet Take 1 tablet (125 mg) by mouth daily Take for 21 days, then 7 days off. (Patient not taking: Reported on 8/13/2021) 21 tablet 0     Past Medical History:   Diagnosis Date     Anxiety      Breast CA (H) 12/2020     Depression      DVT (deep venous thrombosis) (H) 2014     Left breast mass     x approximately 4-5 months     Pulmonary emboli (H)      Pyelonephritis      Schizoaffective disorder (H)      Tobacco use      Past Surgical History:   Procedure Laterality Date     IR LUMBAR KYPHOPLASTY VERTEBRAE  5/17/2021     TUBAL LIGATION  1998       Palliative Symptom Review (0=no symptom/no concern, 1=mild, 2=moderate, 3=severe):      Pain: 3      Fatigue: 1      Nausea: 0      Constipation: 0      Diarrhea: 0      Depressive Symptoms: 0      Anxiety: 0      Drowsiness: 0      Poor Appetite: 0      Shortness of Breath:  0      Insomnia: 1      Other: 0      Overall (0 good/no concerns, 3 very poor):  5    No vitals due to video visit.     GENERAL: Comfortable-appearing, alert and no distress  EYES: Eyes grossly normal to inspection, conjunctivae and sclerae normal.  RESP: No increased work of breathing.  Able to speak easily in complete sentences.  SKIN: no suspicious lesions or rashes on exposed skin  NEURO: Cranial nerves grossly intact, mentation intact and speech normal.    PSYCH: mentation appears normal, affect normal/bright and mood-congruent, judgement and insight intact, normal speech and appearance     Wt Readings from Last 10 Encounters:   08/16/21 112.5 kg (248 lb)   08/10/21 114.8 kg (253 lb)   08/05/21 114.8 kg (253 lb)   07/19/21 111.5 kg (245 lb 12.8 oz)   06/23/21 115.3 kg (254 lb 1.6 oz)   05/27/21 115.9 kg (255 lb 9.6 oz)   05/17/21 116.1 kg (256 lb)   05/13/21 121.1 kg (267 lb)   04/20/21 117.9 kg (260 lb)   03/25/21 113.5 kg (250 lb 3.2 oz)       Data Reviewed:  LABS:   Recent Labs   Lab Test 08/16/21  1258 08/10/21  0808    140   POTASSIUM 3.6 3.7   CHLORIDE 111* 107   CO2 27 25   ANIONGAP 5 8   * 102*   BUN 7 13   CR 0.74 0.77   NANDO 8.9 8.7       ORT Score = 6, due to age, family hx of drug use, and schizoaffective hx    Impressions, Recommendations & Counseling:    Teresa Sanderson is a 46 year old with past medical history significant for left-sided breast cancer with metastasis to bone status post radiation therapy to spine, kyphoplasty L4, and  current treatment regimen including Ibrance, zoladex, and anastrozole evaluated in palliative care clinic via video for pain management. She has been unable to most recent visits due to transportation issues and is requesting assistance.      It looks as though her MS Contin was recently decreased from 30 mg twice daily to 15 mg nightly, suspect this is related to the appearance of complete response via most recent PET scan.  However patient has been  taking 30 mg MS Contin at night in addition to 5-10 mg oxycodone 3-5 times daily.  She complains that the back pain and chest pain in particular keep her up at night, but relatively well managed throughout the day.      Encouraged her to take medications only as directed, we will resume MS Contin dosing every 12 hours at 15 mg daily with 5-10 mg oxycodone 3 times daily as needed until follow-up can be arranged over the next 3-4 weeks.      She is also scheduled for laparoscopic bilateral salpingo-oophorectomy tomorrow and may need an additional short course of analgesics postoperatively but that will be deferred to surgical team.  Otherwise she says physical symptoms are adequately managed and her emotional state is doing well at this time.    Cam Rodríguez, DO  Palliative Medicine Fellow    (This note was transcribed using voice recognition software. While I review and edit the transcription, I may miss errors, and the software sometimes does unexpected capitalizations and formatting that I miss. Please let me know of any serious mistranscriptions and I will addend this note.)    Attending attestation:   Patient seen and evaluated with Dr. Rogers and I agree with findings and recs in this note.   Has had difficulty with following up with appointments due to lack of transportation - will make SW referral.    Had great response on last scan, and MS contin reduced to 15 mg BID, but she is currently taking 30 mg at night only.  Will change back to 15 mg BID, continue PRN oxycodone and plan for close follow-up.  Unclear to me the course of her pain, and if this is worsening back pain despite improvement in cancer?  If still having ongoing pain despite improvement, may benefit from titrating adjuvants also.   Thank you for involving us in the patient's care.   Abby Chi MD / Palliative Medicine / Pager [phone] / After-Hours Answering Service [phone] / Main Palliative Clinic - [facility]  [phone] / [facility] Inpatient Team Consult Pager [phone] (answered 8am-430pm M-F)      Again, thank you for allowing me to participate in the care of your patient.      Sincerely,    Cam Rogers, DO

## 2021-08-20 ENCOUNTER — ANESTHESIA (OUTPATIENT)
Dept: SURGERY | Facility: AMBULATORY SURGERY CENTER | Age: 46
End: 2021-08-20
Payer: COMMERCIAL

## 2021-08-20 ENCOUNTER — HOSPITAL ENCOUNTER (OUTPATIENT)
Facility: AMBULATORY SURGERY CENTER | Age: 46
End: 2021-08-20
Attending: OBSTETRICS & GYNECOLOGY
Payer: COMMERCIAL

## 2021-08-20 VITALS
DIASTOLIC BLOOD PRESSURE: 80 MMHG | RESPIRATION RATE: 8 BRPM | HEART RATE: 73 BPM | TEMPERATURE: 97.2 F | SYSTOLIC BLOOD PRESSURE: 109 MMHG | OXYGEN SATURATION: 98 %

## 2021-08-20 DIAGNOSIS — C50.912 CARCINOMA OF LEFT BREAST METASTATIC TO BONE (H): ICD-10-CM

## 2021-08-20 DIAGNOSIS — C79.51 CARCINOMA OF LEFT BREAST METASTATIC TO BONE (H): ICD-10-CM

## 2021-08-20 DIAGNOSIS — G89.18 POSTOPERATIVE PAIN: Primary | ICD-10-CM

## 2021-08-20 LAB
HCG UR QL: NEGATIVE
INTERNAL QC OK POCT: NORMAL

## 2021-08-20 PROCEDURE — 88307 TISSUE EXAM BY PATHOLOGIST: CPT | Performed by: PATHOLOGY

## 2021-08-20 PROCEDURE — 81025 URINE PREGNANCY TEST: CPT | Performed by: PATHOLOGY

## 2021-08-20 PROCEDURE — 88112 CYTOPATH CELL ENHANCE TECH: CPT | Mod: 26 | Performed by: PATHOLOGY

## 2021-08-20 PROCEDURE — 58661 LAPAROSCOPY REMOVE ADNEXA: CPT

## 2021-08-20 PROCEDURE — 88305 TISSUE EXAM BY PATHOLOGIST: CPT | Mod: 26 | Performed by: PATHOLOGY

## 2021-08-20 PROCEDURE — 88305 TISSUE EXAM BY PATHOLOGIST: CPT | Mod: TC | Performed by: OBSTETRICS & GYNECOLOGY

## 2021-08-20 RX ORDER — PROPOFOL 10 MG/ML
INJECTION, EMULSION INTRAVENOUS PRN
Status: DISCONTINUED | OUTPATIENT
Start: 2021-08-20 | End: 2021-08-20

## 2021-08-20 RX ORDER — HYDROMORPHONE HYDROCHLORIDE 1 MG/ML
1 INJECTION, SOLUTION INTRAMUSCULAR; INTRAVENOUS; SUBCUTANEOUS ONCE
Status: COMPLETED | OUTPATIENT
Start: 2021-08-20 | End: 2021-08-20

## 2021-08-20 RX ORDER — PROPOFOL 10 MG/ML
INJECTION, EMULSION INTRAVENOUS CONTINUOUS PRN
Status: DISCONTINUED | OUTPATIENT
Start: 2021-08-20 | End: 2021-08-20

## 2021-08-20 RX ORDER — OXYCODONE HYDROCHLORIDE 5 MG/1
5 TABLET ORAL EVERY 4 HOURS PRN
Status: DISCONTINUED | OUTPATIENT
Start: 2021-08-20 | End: 2021-08-21 | Stop reason: HOSPADM

## 2021-08-20 RX ORDER — MEPERIDINE HYDROCHLORIDE 25 MG/ML
25 INJECTION INTRAMUSCULAR; INTRAVENOUS; SUBCUTANEOUS ONCE
Status: DISCONTINUED | OUTPATIENT
Start: 2021-08-20 | End: 2021-08-21 | Stop reason: HOSPADM

## 2021-08-20 RX ORDER — IBUPROFEN 800 MG/1
800 TABLET, FILM COATED ORAL EVERY 6 HOURS PRN
Qty: 30 TABLET | Refills: 0 | Status: SHIPPED | OUTPATIENT
Start: 2021-08-20 | End: 2021-10-05

## 2021-08-20 RX ORDER — ONDANSETRON 4 MG/1
4 TABLET, ORALLY DISINTEGRATING ORAL EVERY 30 MIN PRN
Status: DISCONTINUED | OUTPATIENT
Start: 2021-08-20 | End: 2021-08-21 | Stop reason: HOSPADM

## 2021-08-20 RX ORDER — LIDOCAINE 40 MG/G
CREAM TOPICAL
Status: DISCONTINUED | OUTPATIENT
Start: 2021-08-20 | End: 2021-08-20 | Stop reason: HOSPADM

## 2021-08-20 RX ORDER — KETOROLAC TROMETHAMINE 30 MG/ML
INJECTION, SOLUTION INTRAMUSCULAR; INTRAVENOUS PRN
Status: DISCONTINUED | OUTPATIENT
Start: 2021-08-20 | End: 2021-08-20

## 2021-08-20 RX ORDER — PROMETHAZINE HYDROCHLORIDE 25 MG/ML
12.5 INJECTION INTRAMUSCULAR; INTRAVENOUS 2 TIMES DAILY PRN
Status: DISCONTINUED | OUTPATIENT
Start: 2021-08-20 | End: 2021-08-21 | Stop reason: HOSPADM

## 2021-08-20 RX ORDER — ACETAMINOPHEN 325 MG/1
975 TABLET ORAL ONCE
Status: COMPLETED | OUTPATIENT
Start: 2021-08-20 | End: 2021-08-20

## 2021-08-20 RX ORDER — ACETAMINOPHEN 325 MG/1
975 TABLET ORAL EVERY 6 HOURS PRN
Qty: 50 TABLET | Refills: 0 | Status: SHIPPED | OUTPATIENT
Start: 2021-08-20 | End: 2021-10-05

## 2021-08-20 RX ORDER — OXYCODONE HYDROCHLORIDE 5 MG/1
5 TABLET ORAL
Status: COMPLETED | OUTPATIENT
Start: 2021-08-20 | End: 2021-08-20

## 2021-08-20 RX ORDER — ONDANSETRON 2 MG/ML
INJECTION INTRAMUSCULAR; INTRAVENOUS PRN
Status: DISCONTINUED | OUTPATIENT
Start: 2021-08-20 | End: 2021-08-20

## 2021-08-20 RX ORDER — FENTANYL CITRATE 50 UG/ML
INJECTION, SOLUTION INTRAMUSCULAR; INTRAVENOUS PRN
Status: DISCONTINUED | OUTPATIENT
Start: 2021-08-20 | End: 2021-08-20

## 2021-08-20 RX ORDER — HYDRALAZINE HYDROCHLORIDE 20 MG/ML
5 INJECTION INTRAMUSCULAR; INTRAVENOUS EVERY 10 MIN PRN
Status: DISCONTINUED | OUTPATIENT
Start: 2021-08-20 | End: 2021-08-20 | Stop reason: HOSPADM

## 2021-08-20 RX ORDER — SODIUM CHLORIDE, SODIUM LACTATE, POTASSIUM CHLORIDE, CALCIUM CHLORIDE 600; 310; 30; 20 MG/100ML; MG/100ML; MG/100ML; MG/100ML
INJECTION, SOLUTION INTRAVENOUS CONTINUOUS
Status: DISCONTINUED | OUTPATIENT
Start: 2021-08-20 | End: 2021-08-20 | Stop reason: HOSPADM

## 2021-08-20 RX ORDER — IBUPROFEN 200 MG
800 TABLET ORAL ONCE
Status: DISCONTINUED | OUTPATIENT
Start: 2021-08-20 | End: 2021-08-21 | Stop reason: HOSPADM

## 2021-08-20 RX ORDER — LIDOCAINE HYDROCHLORIDE 20 MG/ML
INJECTION, SOLUTION INFILTRATION; PERINEURAL PRN
Status: DISCONTINUED | OUTPATIENT
Start: 2021-08-20 | End: 2021-08-20

## 2021-08-20 RX ORDER — MEPERIDINE HYDROCHLORIDE 25 MG/ML
12.5 INJECTION INTRAMUSCULAR; INTRAVENOUS; SUBCUTANEOUS
Status: DISCONTINUED | OUTPATIENT
Start: 2021-08-20 | End: 2021-08-21 | Stop reason: HOSPADM

## 2021-08-20 RX ORDER — DEXAMETHASONE SODIUM PHOSPHATE 4 MG/ML
INJECTION, SOLUTION INTRA-ARTICULAR; INTRALESIONAL; INTRAMUSCULAR; INTRAVENOUS; SOFT TISSUE PRN
Status: DISCONTINUED | OUTPATIENT
Start: 2021-08-20 | End: 2021-08-20

## 2021-08-20 RX ORDER — GLYCOPYRROLATE 0.2 MG/ML
INJECTION, SOLUTION INTRAMUSCULAR; INTRAVENOUS PRN
Status: DISCONTINUED | OUTPATIENT
Start: 2021-08-20 | End: 2021-08-20

## 2021-08-20 RX ORDER — CEFAZOLIN SODIUM 2 G/50ML
2 SOLUTION INTRAVENOUS
Status: COMPLETED | OUTPATIENT
Start: 2021-08-20 | End: 2021-08-20

## 2021-08-20 RX ORDER — LABETALOL HYDROCHLORIDE 5 MG/ML
5 INJECTION, SOLUTION INTRAVENOUS
Status: DISCONTINUED | OUTPATIENT
Start: 2021-08-20 | End: 2021-08-20 | Stop reason: HOSPADM

## 2021-08-20 RX ORDER — FENTANYL CITRATE 50 UG/ML
25 INJECTION, SOLUTION INTRAMUSCULAR; INTRAVENOUS EVERY 5 MIN PRN
Status: DISCONTINUED | OUTPATIENT
Start: 2021-08-20 | End: 2021-08-20 | Stop reason: HOSPADM

## 2021-08-20 RX ORDER — ONDANSETRON 2 MG/ML
4 INJECTION INTRAMUSCULAR; INTRAVENOUS EVERY 30 MIN PRN
Status: DISCONTINUED | OUTPATIENT
Start: 2021-08-20 | End: 2021-08-21 | Stop reason: HOSPADM

## 2021-08-20 RX ORDER — SODIUM CHLORIDE, SODIUM LACTATE, POTASSIUM CHLORIDE, CALCIUM CHLORIDE 600; 310; 30; 20 MG/100ML; MG/100ML; MG/100ML; MG/100ML
INJECTION, SOLUTION INTRAVENOUS CONTINUOUS
Status: DISCONTINUED | OUTPATIENT
Start: 2021-08-20 | End: 2021-08-21 | Stop reason: HOSPADM

## 2021-08-20 RX ORDER — BUPIVACAINE HYDROCHLORIDE 2.5 MG/ML
INJECTION, SOLUTION EPIDURAL; INFILTRATION; INTRACAUDAL PRN
Status: DISCONTINUED | OUTPATIENT
Start: 2021-08-20 | End: 2021-08-20 | Stop reason: HOSPADM

## 2021-08-20 RX ORDER — CEFAZOLIN SODIUM 2 G/50ML
2 SOLUTION INTRAVENOUS SEE ADMIN INSTRUCTIONS
Status: DISCONTINUED | OUTPATIENT
Start: 2021-08-20 | End: 2021-08-20 | Stop reason: HOSPADM

## 2021-08-20 RX ORDER — MEPERIDINE HYDROCHLORIDE 25 MG/ML
25 INJECTION INTRAMUSCULAR; INTRAVENOUS; SUBCUTANEOUS 2 TIMES DAILY PRN
Status: COMPLETED | OUTPATIENT
Start: 2021-08-20 | End: 2021-08-20

## 2021-08-20 RX ORDER — ACETAMINOPHEN 325 MG/1
975 TABLET ORAL ONCE
Status: DISCONTINUED | OUTPATIENT
Start: 2021-08-20 | End: 2021-08-21 | Stop reason: HOSPADM

## 2021-08-20 RX ORDER — HYDROMORPHONE HYDROCHLORIDE 1 MG/ML
0.2 INJECTION, SOLUTION INTRAMUSCULAR; INTRAVENOUS; SUBCUTANEOUS EVERY 5 MIN PRN
Status: DISCONTINUED | OUTPATIENT
Start: 2021-08-20 | End: 2021-08-20 | Stop reason: HOSPADM

## 2021-08-20 RX ADMIN — PROPOFOL 200 MG: 10 INJECTION, EMULSION INTRAVENOUS at 07:27

## 2021-08-20 RX ADMIN — HYDROMORPHONE HYDROCHLORIDE 0.2 MG: 1 INJECTION, SOLUTION INTRAMUSCULAR; INTRAVENOUS; SUBCUTANEOUS at 08:55

## 2021-08-20 RX ADMIN — PROPOFOL 150 MCG/KG/MIN: 10 INJECTION, EMULSION INTRAVENOUS at 07:28

## 2021-08-20 RX ADMIN — FENTANYL CITRATE 25 MCG: 50 INJECTION, SOLUTION INTRAMUSCULAR; INTRAVENOUS at 08:51

## 2021-08-20 RX ADMIN — ONDANSETRON 4 MG: 2 INJECTION INTRAMUSCULAR; INTRAVENOUS at 07:41

## 2021-08-20 RX ADMIN — HYDROMORPHONE HYDROCHLORIDE 1 MG: 1 INJECTION, SOLUTION INTRAMUSCULAR; INTRAVENOUS; SUBCUTANEOUS at 09:45

## 2021-08-20 RX ADMIN — Medication 0.5 MG: at 08:14

## 2021-08-20 RX ADMIN — MEPERIDINE HYDROCHLORIDE 25 MG: 25 INJECTION INTRAMUSCULAR; INTRAVENOUS; SUBCUTANEOUS at 11:00

## 2021-08-20 RX ADMIN — OXYCODONE HYDROCHLORIDE 5 MG: 5 TABLET ORAL at 08:42

## 2021-08-20 RX ADMIN — PROMETHAZINE HYDROCHLORIDE 12.5 MG: 25 INJECTION INTRAMUSCULAR; INTRAVENOUS at 10:31

## 2021-08-20 RX ADMIN — LIDOCAINE HYDROCHLORIDE 100 MG: 20 INJECTION, SOLUTION INFILTRATION; PERINEURAL at 07:26

## 2021-08-20 RX ADMIN — CEFAZOLIN SODIUM 2 G: 2 SOLUTION INTRAVENOUS at 07:18

## 2021-08-20 RX ADMIN — FENTANYL CITRATE 25 MCG: 50 INJECTION, SOLUTION INTRAMUSCULAR; INTRAVENOUS at 08:38

## 2021-08-20 RX ADMIN — GLYCOPYRROLATE 0.2 MG: 0.2 INJECTION, SOLUTION INTRAMUSCULAR; INTRAVENOUS at 07:15

## 2021-08-20 RX ADMIN — Medication 25 MG: at 07:48

## 2021-08-20 RX ADMIN — FENTANYL CITRATE 25 MCG: 50 INJECTION, SOLUTION INTRAMUSCULAR; INTRAVENOUS at 09:35

## 2021-08-20 RX ADMIN — KETOROLAC TROMETHAMINE 15 MG: 30 INJECTION, SOLUTION INTRAMUSCULAR; INTRAVENOUS at 08:14

## 2021-08-20 RX ADMIN — MEPERIDINE HYDROCHLORIDE 25 MG: 25 INJECTION INTRAMUSCULAR; INTRAVENOUS; SUBCUTANEOUS at 10:30

## 2021-08-20 RX ADMIN — HYDROMORPHONE HYDROCHLORIDE 0.1 MG: 1 INJECTION, SOLUTION INTRAMUSCULAR; INTRAVENOUS; SUBCUTANEOUS at 09:17

## 2021-08-20 RX ADMIN — DEXAMETHASONE SODIUM PHOSPHATE 4 MG: 4 INJECTION, SOLUTION INTRA-ARTICULAR; INTRALESIONAL; INTRAMUSCULAR; INTRAVENOUS; SOFT TISSUE at 07:41

## 2021-08-20 RX ADMIN — FENTANYL CITRATE 25 MCG: 50 INJECTION, SOLUTION INTRAMUSCULAR; INTRAVENOUS at 08:56

## 2021-08-20 RX ADMIN — ACETAMINOPHEN 975 MG: 325 TABLET ORAL at 06:42

## 2021-08-20 RX ADMIN — SODIUM CHLORIDE, SODIUM LACTATE, POTASSIUM CHLORIDE, CALCIUM CHLORIDE: 600; 310; 30; 20 INJECTION, SOLUTION INTRAVENOUS at 06:49

## 2021-08-20 RX ADMIN — FENTANYL CITRATE 25 MCG: 50 INJECTION, SOLUTION INTRAMUSCULAR; INTRAVENOUS at 08:45

## 2021-08-20 RX ADMIN — Medication 25 MG: at 07:27

## 2021-08-20 RX ADMIN — Medication 100 MCG: at 07:37

## 2021-08-20 RX ADMIN — HYDROMORPHONE HYDROCHLORIDE 0.2 MG: 1 INJECTION, SOLUTION INTRAMUSCULAR; INTRAVENOUS; SUBCUTANEOUS at 09:11

## 2021-08-20 RX ADMIN — OXYCODONE HYDROCHLORIDE 5 MG: 5 TABLET ORAL at 10:02

## 2021-08-20 RX ADMIN — FENTANYL CITRATE 100 MCG: 50 INJECTION, SOLUTION INTRAMUSCULAR; INTRAVENOUS at 07:26

## 2021-08-20 NOTE — BRIEF OP NOTE
GYNECOLOGIC ONCOLOGY BRIEF OPERATIVE NOTE     Date of Service: 8/20/2021  Patient Name: Teresa Sanderson  MRN: [id number]      Pre-operative diagnosis:  1. ER/ND positive, HER2 neg Stage IV breast cancer, on zoladex   3. Bone metastases     Post-operative diagnosis:  1. same as above - s/p procedure below     Procedure:   Laparoscopic bilateral salpingo-oophorectomy     Surgeon: Rory Lopez MD  Assistants:   - Mami Nielson MD PGY3  - Tari Hernández MS4     Anesthesia: General     EBL: 5 mL  Urine: 50 mL clear  Fluids: 500 mL cystalloid     Specimens:   ID Type Source Tests Collected by Time Destination   1 : Pelvic washings Washings Pelvis NON-GYNECOLOGIC CYTOLOGY Rory Lopez MD 8/20/2021  7:56 AM    2 :  Tissue Ovary and Fallopian Tube, Right SURGICAL PATHOLOGY EXAM Rory Lopez MD 8/20/2021  8:14 AM    3 :  Tissue Ovary and Fallopian Tube, Left SURGICAL PATHOLOGY EXAM Rory Lopez MD 8/20/2021  8:14 AM         Complications: None    Mami Nielson MD MSc  OBGYN Resident, PGY3  August 20, 2021, 8:31 AM

## 2021-08-20 NOTE — ANESTHESIA CARE TRANSFER NOTE
Patient: Teresa Kin    Procedure(s):  BILATERAL SALPINGO-OOPHORECTOMY, LAPAROSCOPIC    Diagnosis: Carcinoma of left breast metastatic to bone (H) [C50.912, C79.51]  Diagnosis Additional Information: No value filed.    Anesthesia Type:   General     Note:    Oropharynx: oropharynx clear of all foreign objects  Level of Consciousness: awake  Oxygen Supplementation: face mask    Independent Airway: airway patency satisfactory and stable  Dentition: dentition unchanged  Vital Signs Stable: post-procedure vital signs reviewed and stable  Report to RN Given: handoff report given  Patient transferred to: PACU  Comments: VSS and WNL, comfortable, no PONV, report to Radha RAMOS  Handoff Report: Identifed the Patient, Identified the Reponsible Provider, Reviewed the pertinent medical history, Discussed the surgical course, Reviewed Intra-OP anesthesia mangement and issues during anesthesia, Set expectations for post-procedure period and Allowed opportunity for questions and acknowledgement of understanding      Vitals:  Vitals Value Taken Time   BP     Temp     Pulse     Resp     SpO2 100 % 08/20/21 0837   Vitals shown include unvalidated device data.    Electronically Signed By: RENO Serrano CRNA  August 20, 2021  8:39 AM

## 2021-08-20 NOTE — ANESTHESIA POSTPROCEDURE EVALUATION
Patient: Teresa Kin    Procedure(s):  BILATERAL SALPINGO-OOPHORECTOMY, LAPAROSCOPIC    Diagnosis:Carcinoma of left breast metastatic to bone (H) [C50.912, C79.51]  Diagnosis Additional Information: No value filed.    Anesthesia Type:  General    Note:  Disposition: Outpatient   Postop Pain Control: Uneventful            Sign Out: Well controlled pain   PONV: No   Neuro/Psych: Uneventful            Sign Out: Acceptable/Baseline neuro status   Airway/Respiratory: Uneventful            Sign Out: Acceptable/Baseline resp. status   CV/Hemodynamics: Uneventful            Sign Out: Acceptable CV status; No obvious hypovolemia; No obvious fluid overload   Other NRE: NONE   DID A NON-ROUTINE EVENT OCCUR? No           Last vitals:  Vitals Value Taken Time   /86 08/20/21 0845   Temp 36.2  C (97.1  F) 08/20/21 0845   Pulse 58 08/20/21 0852   Resp 20 08/20/21 0852   SpO2 100 % 08/20/21 0852   Vitals shown include unvalidated device data.    Electronically Signed By: STAS MATOS MD  August 20, 2021  8:54 AM

## 2021-08-20 NOTE — DISCHARGE INSTRUCTIONS
[facility]  Home Care Following Anesthesia  For 24 hours after surgery:  1. Get plenty of rest.  A responsible adult must stay with you for at least 24 hours after you leave the surgery center.  2. Do not drive or use heavy equipment.  If you have weakness or tingling, don't drive or use heavy equipment until this feeling goes away.   3. Do not drink alcohol.   4. Avoid strenuous or risky activities.  Ask for help when climbing stairs.  5. You may feel lightheaded.  IF so, sit for a few minutes before standing.  Have someone help you get up.   6. If you have nausea (feel sick to your stomach): Drink only clear liquids such as apple juice, ginger ale, broth or 7-Up.  Rest may also help.  Be sure to drink enough fluids.  Move to a regular diet as you feel able.   7. You may have a slight fever.  Call the doctor if your fever is over 100 F (37.7 C) (taken under the tongue) or lasts longer than 24 hours.  8. You may have a dry mouth, a sore throat, muscle aches or trouble sleeping. These should go away after 24 hours.  9. Do not make important or legal decisions.   10. It is recommended to avoid smoking.               Tips for taking pain medications  To get the best pain relief possible, remember these points:    Take pain medications as directed, before pain becomes severe.    Pain medication can upset your stomach: taking it with food may help.    Constipation is a common side effect of pain medication. Drink plenty of  fluids.    Eat foods high in fiber. Take a stool softener if recommended by your doctor or pharmacist.    Do not drink alcohol, drive or operate machinery while taking pain medications.    Ask about other ways to control pain, such as with heat, ice or relaxation.    Tylenol/Acetaminophen Consumption  To help encourage the safe use of acetaminophen, the makers of TYLENOL  have lowered the maximum daily dose for single-ingredient Extra Strength TYLENOL   (acetaminophen) products sold in the [place] from 8 pills per day (4,000 mg) to 6 pills per day (3,000 mg). The dosing interval has also changed from 2 pills every 4-6 hours to 2 pills every 6 hours.    If you feel your pain relief is insufficient, you may take Tylenol/Acetaminophen in addition to your narcotic pain medication.     Be careful not to exceed 3,000 mg of Tylenol/Acetaminophen in a 24 hour period from all sources.    If you are taking extra strength Tylenol/acetaminophen (500 mg), the maximum dose is 6 tablets in 24 hours.    If you are taking regular strength acetaminophen (325 mg), the maximum dose is 9 tablets in 24 hours.    You received a dose of  975 mg of Tylenol @ 6:45 AM . You may take another dose of Tylenol @ 12:45 PM .     Call a doctor for any of the followin. Signs of infection (fever, growing tenderness at the surgery site, a large amount of drainage or bleeding, severe pain, foul-smelling drainage, redness, swelling).  2. It has been over 8 to 10 hours since surgery and you are still not able to urinate (pass water).  3. Headache for over 24 hours.  4. Numbness, tingling or weakness the day after surgery (if you had spinal anesthesia).  5. Signs of Covid-19 infection (temperature over 100 degrees, shortness of breath, cough, loss of taste/smell, generalized body aches, persistent headache, chills, sore throat, nausea/vomiting/diarrhea)  Your doctor is:  Dr. Rory Lopez, Gynecologic Oncology: [phone]                    Or dial [phone] and ask for the resident on call for:  Gynecologic Oncology  For emergency care, call the:  [facility]:  [phone] (TTY for hearing impaired: [phone])

## 2021-08-23 ENCOUNTER — PATIENT OUTREACH (OUTPATIENT)
Dept: CARE COORDINATION | Facility: CLINIC | Age: 46
End: 2021-08-23

## 2021-08-23 LAB
PATH REPORT.COMMENTS IMP SPEC: NORMAL
PATH REPORT.FINAL DX SPEC: NORMAL
PATH REPORT.GROSS SPEC: NORMAL
PATH REPORT.MICROSCOPIC SPEC OTHER STN: NORMAL
PATH REPORT.RELEVANT HX SPEC: NORMAL

## 2021-08-25 DIAGNOSIS — F41.8 MIXED ANXIETY AND DEPRESSIVE DISORDER: ICD-10-CM

## 2021-08-25 NOTE — TELEPHONE ENCOUNTER
Incoming fax requesting refill for pt's Sertraline 50 mg tablets. Writer will route to Dr. Tellez's team.     Carmen Morfin, RN, BSN, AMADA  RN Care Coordinator  [facility]  [phone]

## 2021-08-26 ENCOUNTER — MYC MEDICAL ADVICE (OUTPATIENT)
Dept: ONCOLOGY | Facility: CLINIC | Age: 46
End: 2021-08-26

## 2021-08-26 LAB
PATH REPORT.COMMENTS IMP SPEC: NORMAL
PATH REPORT.COMMENTS IMP SPEC: NORMAL
PATH REPORT.FINAL DX SPEC: NORMAL
PATH REPORT.GROSS SPEC: NORMAL
PATH REPORT.MICROSCOPIC SPEC OTHER STN: NORMAL
PATH REPORT.RELEVANT HX SPEC: NORMAL
PHOTO IMAGE: NORMAL

## 2021-08-26 NOTE — CONFIDENTIAL NOTE
Received request from pharmacy for refill of sertraline.     Last refill: 6/4/2021  Last office visit: 8/19/2021  No follow up is scheduled yet, will send message to  to follow up - unclear if patient following at [facility] or [facility] going forward.    Will route request to MD for review.

## 2021-08-26 NOTE — PROGRESS NOTES
Social Work Follow-Up Encounter Visit  Oncology Clinic    Data/Intervention:  Patient Name:  Teresa Sanderson  /Age:  1975 (46 year old)    Reason for Follow-Up: Transportation supports    Collaborated With:    -Patient      Resources Provided:  [facility]: [phone] option 2,4  [employer] - Find Your New Affordable Rental Apartment In ...https://www.housinglink.org     Public Housing Assistance/ Section 8 [email or web address]/section-[email or web address]/ (394) 284-7101  ADULT SHELTER [phone]  10:00 am - 5:30 pm Monday through Friday  After hours Call 211 (mobile: [phone]) and ask for the after-hours shelter team     [facility] [phone]    Assessment:  SW called and spoke with patient about their request for assistance with transportation. SW confirmed that patient has ride benefits through their insurance and asked patient if they were aware of this benefit. Patient reported they were not aware. SW provided education about the ride benefit, how patient can access the service and set up rides. SW provided the phone number above. Patient agreed to follow up with the service to schedule needed rides. Patient informed SW that their housing situation would be changing in about two months they would need to look for housing. Patient stated they would like information on resources for temporary housing as well as resources to search for permanent housing. Patient agreed my chart was a good way to provide resources. SW agreed to send a my chart message with the above resources.      Plan:  Previously provided patient/family with writer's contact information and availability.   Patient to review resources and follow up as needed for on going supports. SW will remain available as needed.     Jo CORTEZ, EUEGNE  - Oncology  Phone : [phone]  Pager: [phone]

## 2021-08-27 ENCOUNTER — TELEPHONE (OUTPATIENT)
Dept: ONCOLOGY | Facility: CLINIC | Age: 46
End: 2021-08-27

## 2021-08-27 NOTE — TELEPHONE ENCOUNTER
Oral Chemotherapy Monitoring Program     Placed call to Teresa Sanderson in follow up of Ibrance oral chemotherapy. Wanted to review restart instructions with the patient.     She was instructed to hold Ibrance therapy for 1 week prior to and 1 week after her scheduled procedure on 8/20/21. She is due to restart therapy on 8/28/21. She was understanding of the plan and appreciated the follow-up call.     Follow-up:   1 week for restart assessment.       Li Paige, PharmD  Oral Chemotherapy Monitoring Program  [facility]  [phone]  August 27, 2021

## 2021-09-03 ENCOUNTER — TELEPHONE (OUTPATIENT)
Dept: ONCOLOGY | Facility: CLINIC | Age: 46
End: 2021-09-03

## 2021-09-03 NOTE — TELEPHONE ENCOUNTER
Oral Chemotherapy Monitoring Program    Subjective/Objective:  Teresa Sanderson is a 46 year old female contacted by phone for a follow-up visit for oral chemotherapy.  Pt confirms taking the appropriate dose of Ibrance, 125mg daily for 21 days, then 7 days off. Pt restarted Ibrance on 8/28/21 and has enough medication for a full cycle.    Denies new or worsening side effects, missed doses, and recent hospital or ED visits. Patient has not had any recent medication changes.     ORAL CHEMOTHERAPY 6/28/2021 7/7/2021 7/8/2021 7/29/2021 8/17/2021 8/27/2021 9/3/2021   Assessment Type Monthly Follow up;Other Monthly Follow up;Other Other;Refill Refill Chart Review Other Initial Follow up   Diagnosis Code Breast Cancer Breast Cancer Breast Cancer Breast Cancer Breast Cancer Breast Cancer Breast Cancer   Providers Dr. Dimitrios Plunkett   [facility]   Drug Name Ibrance (palbociclib) Ibrance (palbociclib) Ibrance (palbociclib) Ibrance (palbociclib) Ibrance (palbociclib) Ibrance (palbociclib) Ibrance (palbociclib)   Dose 125 mg 125 mg 125 mg 125 mg 125 mg 125 mg 125 mg   Current Schedule Daily Daily Daily Daily Daily Daily Daily   Cycle Details 3 weeks on, 1 week off 3 weeks on, 1 week off 3 weeks on, 1 week off 3 weeks on, 1 week off Drug on Hold Other 3 weeks on, 1 week off   Start Date of Last Cycle - 6/24/2021 - - - - 8/28/2021   Planned next cycle start date - 7/27/2021 - - - - -   Doses missed in last 2 weeks - more - - - - 0   Adherence Assessment - Non-adherent - - - - Adherent   Reason for Non-adherence - Other - - - - -   Adherence Intervention Recommended - None Calendar;Other - - - -   Adverse Effects - No AE identified during assessment - - - - No AE identified during assessment   Any new drug interactions? - - - - - - No   Pharmacist Intervention? - - - - - - -   Intervention(s)  - - - - - - -   Is the dose as ordered appropriate for the patient? - - - - - - Yes   Since the last time we talked, have you been hospitalized or used the emergency room? - - - - - - No       Last PHQ-2 Score on record:   PHQ-2 ( 1999 [employer]) 5/13/2021   Q1: Little interest or pleasure in doing things 1   Q2: Feeling down, depressed or hopeless 1   PHQ-2 Score 2       Vitals:  BP:   BP Readings from Last 1 Encounters:   08/20/21 109/80     Wt Readings from Last 1 Encounters:   08/16/21 112.5 kg (248 lb)     Estimated body surface area is 2.39 meters squared as calculated from the following:    Height as of 8/10/21: 1.829 m (6').    Weight as of 8/16/21: 112.5 kg (248 lb).    Labs:  _  Result Component Current Result Ref Range   Sodium 143 (8/16/2021) 133 - 144 mmol/L     _  Result Component Current Result Ref Range   Potassium 3.6 (8/16/2021) 3.4 - 5.3 mmol/L     _  Result Component Current Result Ref Range   Calcium 8.9 (8/16/2021) 8.5 - 10.1 mg/dL     No results found for Mag within last 30 days.     No results found for Phos within last 30 days.     _  Result Component Current Result Ref Range   Albumin 3.4 (8/16/2021) 3.4 - 5.0 g/dL     _  Result Component Current Result Ref Range   Urea Nitrogen 7 (8/16/2021) 7 - 30 mg/dL     _  Result Component Current Result Ref Range   Creatinine 0.74 (8/16/2021) 0.52 - 1.04 mg/dL     _  Result Component Current Result Ref Range   AST 26 (8/16/2021) 0 - 45 U/L     _  Result Component Current Result Ref Range   ALT 40 (8/16/2021) 0 - 50 U/L     _  Result Component Current Result Ref Range   Bilirubin Total 0.2 (8/16/2021) 0.2 - 1.3 mg/dL     _  Result Component Current Result Ref Range   WBC Count 5.9 (8/16/2021) 4.0 - 11.0 10e3/uL     _  Result Component Current Result Ref Range   Hemoglobin 10.8 (L) (8/16/2021) 11.7 - 15.7 g/dL     _  Result Component Current Result Ref Range   Platelet Count 293 (8/16/2021) 150 - 450 10e3/uL     No results found for ANC within last 30  days.     Assessment/Plan:  Pt is tolerating Ibrance well since restarting on 8/28/21.  Pt has a full cycle of medication at home and does not need a refill at this time. Continue current therapy as planned.    Follow-Up:  9/13: Review appt with Dr. Plunkett and labs    Grace Myhre, PharmD  Hematology/Oncology Pharmacist  [facility]  [facility]  [phone]

## 2021-09-13 ENCOUNTER — ONCOLOGY VISIT (OUTPATIENT)
Dept: ONCOLOGY | Facility: CLINIC | Age: 46
End: 2021-09-13
Attending: PHYSICIAN ASSISTANT
Payer: COMMERCIAL

## 2021-09-13 ENCOUNTER — APPOINTMENT (OUTPATIENT)
Dept: LAB | Facility: CLINIC | Age: 46
End: 2021-09-13
Attending: INTERNAL MEDICINE
Payer: COMMERCIAL

## 2021-09-13 VITALS
BODY MASS INDEX: 32.78 KG/M2 | DIASTOLIC BLOOD PRESSURE: 90 MMHG | HEIGHT: 72 IN | SYSTOLIC BLOOD PRESSURE: 130 MMHG | WEIGHT: 242 LBS | OXYGEN SATURATION: 100 % | TEMPERATURE: 98.1 F | RESPIRATION RATE: 18 BRPM | HEART RATE: 67 BPM

## 2021-09-13 DIAGNOSIS — C50.912 CARCINOMA OF LEFT BREAST METASTATIC TO BONE (H): Primary | ICD-10-CM

## 2021-09-13 DIAGNOSIS — E05.90 HYPERTHYROIDISM: ICD-10-CM

## 2021-09-13 DIAGNOSIS — R63.4 WEIGHT LOSS: ICD-10-CM

## 2021-09-13 DIAGNOSIS — F30.9 MANIA (H): ICD-10-CM

## 2021-09-13 DIAGNOSIS — F33.1 MODERATE EPISODE OF RECURRENT MAJOR DEPRESSIVE DISORDER (H): ICD-10-CM

## 2021-09-13 DIAGNOSIS — C79.51 CARCINOMA OF LEFT BREAST METASTATIC TO BONE (H): Primary | ICD-10-CM

## 2021-09-13 DIAGNOSIS — B35.3 TINEA PEDIS OF BOTH FEET: ICD-10-CM

## 2021-09-13 PROCEDURE — 99215 OFFICE O/P EST HI 40 MIN: CPT | Performed by: INTERNAL MEDICINE

## 2021-09-13 PROCEDURE — G0463 HOSPITAL OUTPT CLINIC VISIT: HCPCS

## 2021-09-13 RX ORDER — THERMOMETER, ELECTRONIC,ORAL
EACH MISCELLANEOUS 2 TIMES DAILY
Qty: 30 G | Refills: 1 | Status: SHIPPED | OUTPATIENT
Start: 2021-09-13 | End: 2021-10-12

## 2021-09-13 ASSESSMENT — MIFFLIN-ST. JEOR: SCORE: 1849.83

## 2021-09-13 ASSESSMENT — PAIN SCALES - GENERAL: PAINLEVEL: SEVERE PAIN (6)

## 2021-09-13 NOTE — LETTER
9/13/2021         RE: Teresa Sanderson  [address] [address]        Dear Colleague,    Thank you for referring your patient, Teresa Sanderson, to the [facility]. Please see a copy of my visit note below.    Oncology Visit:   Date on this visit: 9/13/2021    Diagnosis:  ER positive left breast cancer metastasized to bones.     Primary Physician: No Ref-Primary, Physician     History Of Present Illness:    Ms. Sanderson is a 46 year old female with a h/o tobacco abuse and DVTs with left breast cancer metastasized to bone. She presented to [facility] ED with back pain on 12/5/2020. MRI of the L-spine showed an abnormal L4 lesion with associated right paraspinal mass, abnormalities in L5 and the left iliac bone were also seen. CT C/A/P showed a left breast mass, lytic lesions of T7, L4, and the pelvis, and a 3 cm lesion in the kidney (thought to be a cyst). Ultrasound of the bilateral lower extremities showed a non-occlusive thrombus in the left popliteal vein. Mammogram and ultrasound of the bilateral breasts on 12/17/2020 showed a spiculated mass measuring at least 7.8 cm at 12-1:00 left breast extending from the nipple to 9 cm from the nipple with associated nipple retraction. This mass was biopsied, and showed IDC with surrounding DCIS, grade 3, ER+ 90%, and ND+ 75%.  HER2 was equivocal in approximately 35% of tumor cells by FISH and was negative by IHC.    Metastatic Breast Cancer Treatment:  12/23/2021 - 1/7/2021  Radiation (3000 cGy) to the lumbar spine.  1/29/2021 - present  Ibrance, zoladex, and anastrozole.  5/17/2021 radiofrequency ablation, kyphoplasty to L4  8/20/2021  Bilateral salpingo-oophorectomies, Ibrance and anastrozole    Interval History:  Ms. Sanderson comes into clinic today for metastatic breast cancer follow-up.  She states that her mental health has been poor.  She is suffering from severe depression.  She was feeling unloved and the other  day decided spur of the moment to drive to [place] to see her family.  She stayed 1 night and then drove back.  She just returned and therefore is very fatigued at this time.  She has had a number of sleepless nights.  At times she will get up and cook and because she has no appetite, she will not eat it, and will just stick the entire pot of what she made in the fridge.  At times she will not sleep for 2 to 3 days straight.  She reports a history of psychosis and schizophrenia.  She denies suicidal ideation.  She also continues to have significant low back pain.  It is worse when she goes from a sitting to a standing position.  She used to have associated sciatica, however this improved after having the injection to the lumbar vertebrae.  She denies new bone or joint aches or pains.  She has no cough, shortness of breath, or chest pain.  She has itchiness and peeling of skin on her bilateral feet.  Itchiness is only occurring at night.  The remainder of a complete 12 point review of systems is reviewed with patient was negative with exception that mentioned above.    Past Medical/Surgical History:   Past Medical History:   Diagnosis Date     Anxiety      Breast CA (H) 12/2020     Depression      DVT (deep venous thrombosis) (H) 2014     Left breast mass     x approximately 4-5 months     Pulmonary emboli (H)      Pyelonephritis      Schizoaffective disorder (H)      Tobacco use      Past Surgical History:   Procedure Laterality Date     IR LUMBAR KYPHOPLASTY VERTEBRAE  5/17/2021     LAPAROSCOPIC SALPINGO-OOPHORECTOMY Bilateral 8/20/2021    Procedure: BILATERAL SALPINGO-OOPHORECTOMY, LAPAROSCOPIC;  Surgeon: Rory Lopez MD;  Location: [facility] OR     TUBAL LIGATION  1998        Allergies   Allergen Reactions     Contrast Dye      Pt developed nausea after isovue 370 injection on 6/9/21        Current Outpatient Medications   Medication     acetaminophen (TYLENOL) 325 MG tablet     acetaminophen (TYLENOL)  "500 MG tablet     anastrozole (ARIMIDEX) 1 MG tablet     doxepin (SINEQUAN) 10 MG capsule     gabapentin (NEURONTIN) 300 MG capsule     ibuprofen (ADVIL/MOTRIN) 200 MG tablet     ibuprofen (ADVIL/MOTRIN) 800 MG tablet     methocarbamol (ROBAXIN) 500 MG tablet     morphine (MS CONTIN) 15 MG CR tablet     naloxone (NARCAN) 4 MG/0.1ML nasal spray     nicotine (NICORETTE) 2 MG gum     ondansetron (ZOFRAN-ODT) 4 MG ODT tab     oxyCODONE (ROXICODONE) 5 MG tablet     palbociclib (IBRANCE) 125 MG tablet     pantoprazole (PROTONIX) 40 MG EC tablet     polyethylene glycol (MIRALAX) 17 GM/Dose powder     prochlorperazine (COMPAZINE) 10 MG tablet     rivaroxaban ANTICOAGULANT (XARELTO) 20 MG TABS tablet     SENNA-docusate sodium (SENNA S) 8.6-50 MG tablet     sertraline (ZOLOFT) 50 MG tablet     No current facility-administered medications for this visit.   '    Family and Social History:   Please see initial consultation dated 12/22/2020 for further details.    Physical Exam:   BP (!) 130/90   Pulse 67   Temp 98.1  F (36.7  C)   Resp 18   Ht 1.829 m (6' 0.01\")   Wt 109.8 kg (242 lb)   SpO2 100%   BMI 32.81 kg/m    General:  Well appearing adult female in NAD.  Alert and oriented.  HEENT:  Normocephalic.  Sclera anicteric.  MMM.  No lesions of the oropharynx.  Lymph:  No palpable cervical, supraclavicular, or axillary LAD.  Chest:  CTA bilaterally.  No wheezes or crackles.  CV:  RRR.  Nl S1 and S2.  No m/r/g.  Abd:  Soft/ND.   Ext:  No pitting edema of the bilateral lower extremities.    Musculo:  Full ROM of the bilateral upper extremities.  Neuro:  Cranial nerves grossly intact.  Psych:  Mood and affect appear normal.  Skin:  Significant peeling of the heals and toes of the bilateral feet.  Per patient this is very pruritic.    Laboratory/Imaging Studies   6/9/2021 PET/CT:  - No hypermetabolic disease in the chest, abdomen, or pelvis.  - Mild FDG uptake in the region of the left breast is below background c/w complete " response.  - Scattered mixed lytic and sclerotic osseous foci, likely representing healed metastatic disease.  - Left greater than right perihilar GGO.  - Kyphoplasty changes to L4.    8/20/2021 BSO:  Right ovary and fallopian tube negative for malignancy.  Left ovary and fallopian tube negative for malignancy.    CA27.29 tumor marker  59 --- 63 --- 61 --- 58 (5/13/2021 - 7/19/2021).  Of note, peak CA27.29 was 84 on 1/18/2021.    ASSESSMENT/PLAN:   46 year old female with history of DVT with left breast invasive ductal carcinoma, ER/OH positive, HER2 negative metastasized to bones.    1.  Metastatic breast cancer:  Since last visit, she underwent BSO and therefore no longer requires zoladex injections.  She continues on Ibrance and anastrozole.  Her tumor markers remain stable.  Will continue to monitor monthly.  Plan to repeat imaging in 12/2021.    2.  Soraya:  I am concerned her recent symptoms including sleeplessness, decision to spur of the moment drive to [place], and cooking in the middle of the night are c/w soraya.  Will check TSH to see if hyperthyroidism is contributing.  Referral to psychiatry placed.    3.  Bone metastases/back pain:  She is s/p XRT to the lumbar spine and kyphoplasty of L4.  She is taking MS contin 15 mg PO BID (decreased dose since last visit) and oxycodone prn.  We reviewed that most recent imaging showed excellent response to treatment and tumor markers suggest ongoing response.  That being said there is quite a bit of damage to her vertebrae.  I emphasized that she needs to attend palliative medicine appointments to discuss pain regimen.  I would like to avoid escalating pain meds if able.  I also would like to see her more active, perhaps even applying for employment.      She has been on Zometa since 1/18/2021 and is receiving once every 3 months.  She will receive next dose on 10/12.    4.  DVT:  Recommend continuing Xarelto until contraindicated given metastatic disease.        5.  Tinea pedis:  Prescription for tolnaftate cream provided today.    6.  Follow Up:  Psychiatry visit with Dr. Tyson within 2 weeks.  Please add on visit with Alee Yang around 12/7/2021.  PET/CT 1-2 business days before the December visit.    40 minutes spent on the date of the encounter doing chart review, review of test results, interpretation of tests, patient visit and documentation.         Again, thank you for allowing me to participate in the care of your patient.        Sincerely,        Jahaira Plunkett MD

## 2021-09-13 NOTE — NURSING NOTE
Chief Complaint   Patient presents with     Labs Only     venipuncture, vitals checked     Karma Martinez RN on 9/13/2021 at 12:44 PM

## 2021-09-13 NOTE — PROGRESS NOTES
Oncology Visit:   Date on this visit: 9/13/2021    Diagnosis:  ER positive left breast cancer metastasized to bones.     Primary Physician: No Ref-Primary, Physician     History Of Present Illness:    Ms. Sanderson is a 46 year old female with a h/o tobacco abuse and DVTs with left breast cancer metastasized to bone. She presented to [facility] ED with back pain on 12/5/2020. MRI of the L-spine showed an abnormal L4 lesion with associated right paraspinal mass, abnormalities in L5 and the left iliac bone were also seen. CT C/A/P showed a left breast mass, lytic lesions of T7, L4, and the pelvis, and a 3 cm lesion in the kidney (thought to be a cyst). Ultrasound of the bilateral lower extremities showed a non-occlusive thrombus in the left popliteal vein. Mammogram and ultrasound of the bilateral breasts on 12/17/2020 showed a spiculated mass measuring at least 7.8 cm at 12-1:00 left breast extending from the nipple to 9 cm from the nipple with associated nipple retraction. This mass was biopsied, and showed IDC with surrounding DCIS, grade 3, ER+ 90%, and [place]+ 75%.  HER2 was equivocal in approximately 35% of tumor cells by FISH and was negative by IHC.    Metastatic Breast Cancer Treatment:  12/23/2021 - 1/7/2021  Radiation (3000 cGy) to the lumbar spine.  1/29/2021 - present  Ibrance, zoladex, and anastrozole.  5/17/2021 radiofrequency ablation, kyphoplasty to L4  8/20/2021  Bilateral salpingo-oophorectomies, Ibrance and anastrozole    Interval History:  Ms. Sanderson comes into clinic today for metastatic breast cancer follow-up.  She states that her mental health has been poor.  She is suffering from severe depression.  She was feeling unloved and the other day decided spur of the moment to drive to [place] to see her family.  She stayed 1 night and then drove back.  She just returned and therefore is very fatigued at this time.  She has had a number of sleepless nights.  At times she will get up and cook and because  she has no appetite, she will not eat it, and will just stick the entire pot of what she made in the fridge.  At times she will not sleep for 2 to 3 days straight.  She reports a history of psychosis and schizophrenia.  She denies suicidal ideation.  She also continues to have significant low back pain.  It is worse when she goes from a sitting to a standing position.  She used to have associated sciatica, however this improved after having the injection to the lumbar vertebrae.  She denies new bone or joint aches or pains.  She has no cough, shortness of breath, or chest pain.  She has itchiness and peeling of skin on her bilateral feet.  Itchiness is only occurring at night.  The remainder of a complete 12 point review of systems is reviewed with patient was negative with exception that mentioned above.    Past Medical/Surgical History:   Past Medical History:   Diagnosis Date     Anxiety      Breast CA (H) 12/2020     Depression      DVT (deep venous thrombosis) (H) 2014     Left breast mass     x approximately 4-5 months     Pulmonary emboli (H)      Pyelonephritis      Schizoaffective disorder (H)      Tobacco use      Past Surgical History:   Procedure Laterality Date     IR LUMBAR KYPHOPLASTY VERTEBRAE  5/17/2021     LAPAROSCOPIC SALPINGO-OOPHORECTOMY Bilateral 8/20/2021    Procedure: BILATERAL SALPINGO-OOPHORECTOMY, LAPAROSCOPIC;  Surgeon: Rory Lopez MD;  Location: [facility] OR     TUBAL LIGATION  1998        Allergies   Allergen Reactions     Contrast Dye      Pt developed nausea after isovue 370 injection on 6/9/21        Current Outpatient Medications   Medication     acetaminophen (TYLENOL) 325 MG tablet     acetaminophen (TYLENOL) 500 MG tablet     anastrozole (ARIMIDEX) 1 MG tablet     doxepin (SINEQUAN) 10 MG capsule     gabapentin (NEURONTIN) 300 MG capsule     ibuprofen (ADVIL/MOTRIN) 200 MG tablet     ibuprofen (ADVIL/MOTRIN) 800 MG tablet     methocarbamol (ROBAXIN) 500 MG tablet      "morphine (MS CONTIN) 15 MG CR tablet     naloxone (NARCAN) 4 MG/0.1ML nasal spray     nicotine (NICORETTE) 2 MG gum     ondansetron (ZOFRAN-ODT) 4 MG ODT tab     oxyCODONE (ROXICODONE) 5 MG tablet     palbociclib (IBRANCE) 125 MG tablet     pantoprazole (PROTONIX) 40 MG EC tablet     polyethylene glycol (MIRALAX) 17 GM/Dose powder     prochlorperazine (COMPAZINE) 10 MG tablet     rivaroxaban ANTICOAGULANT (XARELTO) 20 MG TABS tablet     SENNA-docusate sodium (SENNA S) 8.6-50 MG tablet     sertraline (ZOLOFT) 50 MG tablet     No current facility-administered medications for this visit.   '    Family and Social History:   Please see initial consultation dated 12/22/2020 for further details.    Physical Exam:   BP (!) 130/90   Pulse 67   Temp 98.1  F (36.7  C)   Resp 18   Ht 1.829 m (6' 0.01\")   Wt 109.8 kg (242 lb)   SpO2 100%   BMI 32.81 kg/m    General:  Well appearing adult female in NAD.  Alert and oriented.  HEENT:  Normocephalic.  Sclera anicteric.  MMM.  No lesions of the oropharynx.  Lymph:  No palpable cervical, supraclavicular, or axillary LAD.  Chest:  CTA bilaterally.  No wheezes or crackles.  CV:  RRR.  Nl S1 and S2.  No m/r/g.  Abd:  Soft/ND.   Ext:  No pitting edema of the bilateral lower extremities.    Musculo:  Full ROM of the bilateral upper extremities.  Neuro:  Cranial nerves grossly intact.  Psych:  Mood and affect appear normal.  Skin:  Significant peeling of the heals and toes of the bilateral feet.  Per patient this is very pruritic.    Laboratory/Imaging Studies   6/9/2021 PET/CT:  - No hypermetabolic disease in the chest, abdomen, or pelvis.  - Mild FDG uptake in the region of the left breast is below background c/w complete response.  - Scattered mixed lytic and sclerotic osseous foci, likely representing healed metastatic disease.  - Left greater than right perihilar GGO.  - Kyphoplasty changes to L4.    8/20/2021 BSO:  Right ovary and fallopian tube negative for malignancy.  Left " ovary and fallopian tube negative for malignancy.    CA27.29 tumor marker  59 --- 63 --- 61 --- 58 (5/13/2021 - 7/19/2021).  Of note, peak CA27.29 was 84 on 1/18/2021.    ASSESSMENT/PLAN:   46 year old female with history of DVT with left breast invasive ductal carcinoma, ER/RI positive, HER2 negative metastasized to bones.    1.  Metastatic breast cancer:  Since last visit, she underwent BSO and therefore no longer requires zoladex injections.  She continues on Ibrance and anastrozole.  Her tumor markers remain stable.  Will continue to monitor monthly.  Plan to repeat imaging in 12/2021.    2.  Soraya:  I am concerned her recent symptoms including sleeplessness, decision to spur of the moment drive to [place], and cooking in the middle of the night are c/w soraya.  Will check TSH to see if hyperthyroidism is contributing.  Referral to psychiatry placed.    3.  Bone metastases/back pain:  She is s/p XRT to the lumbar spine and kyphoplasty of L4.  She is taking MS contin 15 mg PO BID (decreased dose since last visit) and oxycodone prn.  We reviewed that most recent imaging showed excellent response to treatment and tumor markers suggest ongoing response.  That being said there is quite a bit of damage to her vertebrae.  I emphasized that she needs to attend palliative medicine appointments to discuss pain regimen.  I would like to avoid escalating pain meds if able.  I also would like to see her more active, perhaps even applying for employment.      She has been on Zometa since 1/18/2021 and is receiving once every 3 months.  She will receive next dose on 10/12.    4.  DVT:  Recommend continuing Xarelto until contraindicated given metastatic disease.       5.  Tinea pedis:  Prescription for tolnaftate cream provided today.    6.  Follow Up:  Psychiatry visit with Dr. Tyson within 2 weeks.  Please add on visit with Alee Yang around 12/7/2021.  PET/CT 1-2 business days before the December visit.    40 minutes  spent on the date of the encounter doing chart review, review of test results, interpretation of tests, patient visit and documentation.

## 2021-09-13 NOTE — NURSING NOTE
"Oncology Rooming Note    September 13, 2021 1:01 PM   Teresa Sanderson is a 46 year old female who presents for:    Chief Complaint   Patient presents with     Labs Only     venipuncture, vitals checked     Oncology Clinic Visit     [facility] RETURN - BREAST CANCER     Initial Vitals: BP (!) 130/90   Pulse 67   Temp 98.1  F (36.7  C)   Resp 18   Ht 1.829 m (6' 0.01\")   Wt 109.8 kg (242 lb)   SpO2 100%   BMI 32.81 kg/m   Estimated body mass index is 32.81 kg/m  as calculated from the following:    Height as of this encounter: 1.829 m (6' 0.01\").    Weight as of this encounter: 109.8 kg (242 lb). Body surface area is 2.36 meters squared.  Severe Pain (6) Comment: Data Unavailable   No LMP recorded. Patient has had an injection.  Allergies reviewed: Yes  Medications reviewed: Yes    Medications: Medication refills not needed today.  Pharmacy name entered into [employer]:    [facility] [place], [place] - [id number]  [facility] - [place], [place] - [address] [facility] [place] - [address] [id number] [place] [address]   [facility] [place] [address]  [id number] - [place] - [address] [facility] [address]  [facility] - [place], [place] - [address]  [facility] - [place], [place] - [address]    Clinical concerns: No new concerns. Dimitrios was notified.      Joao Moseley LPN            "

## 2021-09-15 ENCOUNTER — VIRTUAL VISIT (OUTPATIENT)
Dept: ONCOLOGY | Facility: CLINIC | Age: 46
End: 2021-09-15
Attending: OBSTETRICS & GYNECOLOGY
Payer: COMMERCIAL

## 2021-09-15 DIAGNOSIS — C50.919 MALIGNANT NEOPLASM OF FEMALE BREAST, UNSPECIFIED ESTROGEN RECEPTOR STATUS, UNSPECIFIED LATERALITY, UNSPECIFIED SITE OF BREAST (H): Primary | ICD-10-CM

## 2021-09-15 PROCEDURE — 99024 POSTOP FOLLOW-UP VISIT: CPT | Performed by: OBSTETRICS & GYNECOLOGY

## 2021-09-15 PROCEDURE — 999N001193 HC VIDEO/TELEPHONE VISIT; NO CHARGE

## 2021-09-15 NOTE — PATIENT INSTRUCTIONS
No further post-op restrictions.    No further follow-up needed in Gyn Oncology.     Continue to have regular Pap smears with general gynecologist or general practitioner. If any vaginal bleeding, this should be evaluated.

## 2021-09-15 NOTE — LETTER
9/15/2021         RE: Teresa Sanderson  [address] [facility] [address]        Dear Colleague,    Thank you for referring your patient, Teresa Sanderson, to the [facility]. Please see a copy of my visit note below.    Teresa is a 46 year old who is being evaluated via a billable video visit.      How would you like to obtain your AVS? MyChart  If the video visit is dropped, the invitation should be resent by: Text to cell phone: [phone]  Will anyone else be joining your video visit? No      Video Start Time: 8:06 AM  Video-Visit Details    Type of service:  Video Visit    Video End Time:8:06 AM    Originating Location (pt. Location): Home    Distant Location (provider location):  [facility]     Platform used for Video Visit: [facility]     Gynecologic Oncology Clinic - Post-operative appointment    Visit date: Sep 15, 2021     CC: post-op    Interval history: Teresa Sanderson is a 46 year old  who underwent laparoscopic bilateral salpingo-oophorectomy for therapeutic reasons due to hormone receptor positive breast cancer    Doing well post-op. No incisional concerns.     Past Surgical History:  Past Surgical History:   Procedure Laterality Date     IR LUMBAR KYPHOPLASTY VERTEBRAE  5/17/2021     LAPAROSCOPIC SALPINGO-OOPHORECTOMY Bilateral 8/20/2021    Procedure: BILATERAL SALPINGO-OOPHORECTOMY, LAPAROSCOPIC;  Surgeon: Rory Lopez MD;  Location: [facility] OR     TUBAL LIGATION  1998        Physical Exam:  There were no vitals taken for this visit.   General appearance: no acute distress, well groomed, sitting comfortably     Pathology:  Surgical Pathology Report                         Case: [id number]                                   Authorizing Provider:  Rory Lopez,  Collected:           08/20/2021 08:14 AM                                  MD                                                                             Ordering Location:     [facility] Main OR  Received:            08/20/2021 08:45 AM                                  [place]                                                                   Pathologist:           Lorena Dover MD                                                    Specimens:   A) - Ovary and Fallopian Tube, Right                                                                 B) - Ovary and Fallopian Tube, Left                                                        Final Diagnosis   A. Ovary and fallopian tube, right, laparoscopic salpingo-oophorectomy:  - Ovary with cystic follicles, cortical inclusion cysts and surface adhesions  - Fallopian tube not identified by gross examination, but microscopic examination shows one cross section of proximal fallopian tube  - Para-tubal tissue with paratubal cysts and surface adhesions  - Negative for malignancy     B. Ovary and fallopian tube, left, laparoscopic salpingo-oophorectomy:  - Ovary with cystic follicles and surface adhesions  - Fimbriated fallopian tube with paratubal cysts  - Negative for malignancy   Electronically signed by Lorena Dover MD on 8/26/2021 at  8:08 PM         Assessment:  Teresa is a 46 year old s/p therapeutic bilateral salpingo-oophorectomy doing well post-op with benign pathology.    Plan:  - No further surgery restrictions.   - She should continue routine cervical cancer screening with her general gynecologist or practitioner. No further follow-up in our clinic.    Candido Austin MD     Gynecologic Oncology

## 2021-09-15 NOTE — PROGRESS NOTES
Teresa is a 46 year old who is being evaluated via a billable video visit.      How would you like to obtain your AVS? MyChart  If the video visit is dropped, the invitation should be resent by: Text to cell phone: [phone]  Will anyone else be joining your video visit? No      Video Start Time: 8:06 AM  Video-Visit Details    Type of service:  Video Visit    Video End Time:8:06 AM    Originating Location (pt. Location): Home    Distant Location (provider location):  [facility]     Platform used for Video Visit: [facility]     Gynecologic Oncology Clinic - Post-operative appointment    Visit date: Sep 15, 2021     CC: post-op    Interval history: Teresa Sanderson is a 46 year old  who underwent laparoscopic bilateral salpingo-oophorectomy for therapeutic reasons due to hormone receptor positive breast cancer    Doing well post-op. No incisional concerns.     Past Surgical History:  Past Surgical History:   Procedure Laterality Date     IR LUMBAR KYPHOPLASTY VERTEBRAE  5/17/2021     LAPAROSCOPIC SALPINGO-OOPHORECTOMY Bilateral 8/20/2021    Procedure: BILATERAL SALPINGO-OOPHORECTOMY, LAPAROSCOPIC;  Surgeon: Rory Lopez MD;  Location: [facility] OR     TUBAL LIGATION  1998        Physical Exam:  There were no vitals taken for this visit.   General appearance: no acute distress, well groomed, sitting comfortably     Pathology:  Surgical Pathology Report                         Case: [id number]                                   Authorizing Provider:  Rory Lopez,  Collected:           08/20/2021 08:14 AM                                  MD                                                                            Ordering Location:     [facility] Main OR  Received:            08/20/2021 08:45 AM                                  [place]                                                                   Pathologist:           Lorena Dover MD                                                     Specimens:   A) - Ovary and Fallopian Tube, Right                                                                 B) - Ovary and Fallopian Tube, Left                                                        Final Diagnosis   A. Ovary and fallopian tube, right, laparoscopic salpingo-oophorectomy:  - Ovary with cystic follicles, cortical inclusion cysts and surface adhesions  - Fallopian tube not identified by gross examination, but microscopic examination shows one cross section of proximal fallopian tube  - Para-tubal tissue with paratubal cysts and surface adhesions  - Negative for malignancy     B. Ovary and fallopian tube, left, laparoscopic salpingo-oophorectomy:  - Ovary with cystic follicles and surface adhesions  - Fimbriated fallopian tube with paratubal cysts  - Negative for malignancy   Electronically signed by Lorena Dover MD on 8/26/2021 at  8:08 PM         Assessment:  Teresa is a 46 year old s/p therapeutic bilateral salpingo-oophorectomy doing well post-op with benign pathology.    Plan:  - No further surgery restrictions.   - She should continue routine cervical cancer screening with her general gynecologist or practitioner. No further follow-up in our clinic.    Candido Austin MD     Gynecologic Oncology

## 2021-09-16 DIAGNOSIS — C79.51 CARCINOMA OF LEFT BREAST METASTATIC TO BONE (H): Primary | ICD-10-CM

## 2021-09-16 DIAGNOSIS — C50.912 CARCINOMA OF LEFT BREAST METASTATIC TO BONE (H): Primary | ICD-10-CM

## 2021-09-16 DIAGNOSIS — C50.812 MALIGNANT NEOPLASM OF OVERLAPPING SITES OF LEFT BREAST IN FEMALE, ESTROGEN RECEPTOR POSITIVE (H): ICD-10-CM

## 2021-09-16 DIAGNOSIS — Z17.0 MALIGNANT NEOPLASM OF OVERLAPPING SITES OF LEFT BREAST IN FEMALE, ESTROGEN RECEPTOR POSITIVE (H): ICD-10-CM

## 2021-09-16 RX ORDER — ANASTROZOLE 1 MG/1
1 TABLET ORAL DAILY
Qty: 28 TABLET | Refills: 0 | Status: SHIPPED | OUTPATIENT
Start: 2021-09-16 | End: 2021-10-05

## 2021-09-21 ENCOUNTER — PATIENT OUTREACH (OUTPATIENT)
Dept: CARE COORDINATION | Facility: CLINIC | Age: 46
End: 2021-09-21

## 2021-09-21 NOTE — PROGRESS NOTES
Oncology Distress Screening Follow-up  Clinical Social Work  [facility]    Identified Concern and Score From Distress Screening:  3. How concerned are you about feeling depressed or very sad?   10Abnormal            4. How concerned are you about feeling anxious or very scared?   6Abnormal            5. Do you struggle with the loss of meaning and david in your life?   Quite a bitAbnormal            6. How concerned are you about work and home life issues that may be affected by your cancer?   8Abnormal            8. Do you currently have what you would describe as Rastafarian or spiritual struggles?              Quite a bitAbnormal                  Date of Distress Screenin/15/21      Data: Teresa is a 46 year old who is being evaluated for breast cancer.  At time of last visit, Patient scored positive on distress screen.  called Patient today with intention of introducing them to psychosocial services and support, and following up on elevated distress.        Intervention/Education Provided: Phone call to patient about distress screening on 21 an 21. No answer - message left. Provided contact information in order to reach writer.       Follow-up Required: Will remain available for support and await patient's return call.          Jo CORTEZ, SVETA  - Oncology  Phone : [phone]  Pager: [phone]

## 2021-09-24 DIAGNOSIS — C50.912 CARCINOMA OF LEFT BREAST METASTATIC TO BONE (H): Primary | ICD-10-CM

## 2021-09-24 DIAGNOSIS — C79.51 CARCINOMA OF LEFT BREAST METASTATIC TO BONE (H): Primary | ICD-10-CM

## 2021-09-24 DIAGNOSIS — Z17.0 MALIGNANT NEOPLASM OF OVERLAPPING SITES OF LEFT BREAST IN FEMALE, ESTROGEN RECEPTOR POSITIVE (H): ICD-10-CM

## 2021-09-24 DIAGNOSIS — C50.812 MALIGNANT NEOPLASM OF OVERLAPPING SITES OF LEFT BREAST IN FEMALE, ESTROGEN RECEPTOR POSITIVE (H): ICD-10-CM

## 2021-09-26 ENCOUNTER — DOCUMENTATION ONLY (OUTPATIENT)
Dept: ONCOLOGY | Facility: CLINIC | Age: 46
End: 2021-09-26

## 2021-09-27 ENCOUNTER — MYC MEDICAL ADVICE (OUTPATIENT)
Dept: ONCOLOGY | Facility: CLINIC | Age: 46
End: 2021-09-27

## 2021-09-27 DIAGNOSIS — C50.812 MALIGNANT NEOPLASM OF OVERLAPPING SITES OF LEFT BREAST IN FEMALE, ESTROGEN RECEPTOR POSITIVE (H): ICD-10-CM

## 2021-09-27 DIAGNOSIS — C79.51 CARCINOMA OF LEFT BREAST METASTATIC TO BONE (H): ICD-10-CM

## 2021-09-27 DIAGNOSIS — N63.0 BREAST MASS: ICD-10-CM

## 2021-09-27 DIAGNOSIS — C79.51 METASTASIS TO BONE (H): ICD-10-CM

## 2021-09-27 DIAGNOSIS — M79.661 PAIN OF RIGHT LOWER LEG: ICD-10-CM

## 2021-09-27 DIAGNOSIS — C50.919 MALIGNANT NEOPLASM OF FEMALE BREAST, UNSPECIFIED ESTROGEN RECEPTOR STATUS, UNSPECIFIED LATERALITY, UNSPECIFIED SITE OF BREAST (H): ICD-10-CM

## 2021-09-27 DIAGNOSIS — G47.00 INSOMNIA: Primary | ICD-10-CM

## 2021-09-27 DIAGNOSIS — C79.51 SPINE METASTASIS: ICD-10-CM

## 2021-09-27 DIAGNOSIS — C50.912 CARCINOMA OF LEFT BREAST METASTATIC TO BONE (H): ICD-10-CM

## 2021-09-27 DIAGNOSIS — Z17.0 MALIGNANT NEOPLASM OF OVERLAPPING SITES OF LEFT BREAST IN FEMALE, ESTROGEN RECEPTOR POSITIVE (H): ICD-10-CM

## 2021-09-30 ENCOUNTER — TELEPHONE (OUTPATIENT)
Dept: PSYCHIATRY | Facility: CLINIC | Age: 46
End: 2021-09-30

## 2021-09-30 ENCOUNTER — DOCUMENTATION ONLY (OUTPATIENT)
Dept: ONCOLOGY | Facility: CLINIC | Age: 46
End: 2021-09-30

## 2021-09-30 NOTE — PROGRESS NOTES
9/30/2021    Teresa was scheduled for a genetic counseling video visit with me today. I sent her invitations to join the video by text message and email. I was able to reach her by phone. Teresa stated that she was fatigued and not feeling well. She would like someone to contact her about rescheduling her genetic counseling appointment, in addition, to her other missed appointments from last week. I planned to notify her care team.     Nadya Villareal MS  Genetic Counseling Intern  Ph: [phone]    Supervising Genetic Counselor  Ariana Carrillo MS, [facility]  Genetic Counselor  Ph: [phone]

## 2021-09-30 NOTE — TELEPHONE ENCOUNTER
"PSYCHIATRY CLINIC PHONE INTAKE     SERVICES REQUESTED / INTERESTED IN          Med Management    Presenting Problem and Brief History                              What would you like to be seen for? (brief description):  Patient is referred by Dr. Plunkett. Patient reports her mental status is not good, doesn't know if she is coming or going. She reports she is going through a lot. She lays around all day but is not able to sleep for multiple days at a time. When she does sleep, she will sleep for 2-3 days. Patient reports the last month it is gotten worse and she cannot think anything positive.   Have you received a mental health diagnosis? Yes   Which one (s): MDD  Is there any history of developmental delay?  No   Are you currently seeing a mental health provider?  No            Who / month last seen:    Do you have mental health records elsewhere?  Yes - [place]  Will you sign a release so we can obtain them?  Yes    Have you ever been hospitalized for psychiatric reasons?  Yes  Describe:  Most recently about 2 years ago in [place]    Do you have current thoughts of self-harm?  Yes  - \"sometimes I want to end it all\"  Do you currently have thoughts of harming others?  No         Social History     Who is the patient's a guardian?  No    Name / number:   Have you had an ACT team in last 12 months?  No  Describe:    OK to leave a detailed voicemail? Yes    Medical/ Surgical History                                   Patient Active Problem List   Diagnosis     Breast mass     Spine metastasis (H)     Urinary tract infection with hematuria     Malignant neoplasm of overlapping sites of left breast in female, estrogen receptor positive (H)     Carcinoma of left breast metastatic to bone (H)     Metastasis to bone (H)     Pain of right lower leg     Malignant neoplasm of female breast, unspecified estrogen receptor status, unspecified laterality, unspecified site of breast (H)          Medications         "     Current Outpatient Medications   Medication Sig Dispense Refill     acetaminophen (TYLENOL) 325 MG tablet Take 3 tablets (975 mg) by mouth every 6 hours as needed for mild pain 50 tablet 0     acetaminophen (TYLENOL) 500 MG tablet Take 1,000 mg by mouth every 8 hours as needed for mild pain       anastrozole (ARIMIDEX) 1 MG tablet Take 1 tablet (1 mg) by mouth daily for 28 days 28 tablet 0     doxepin (SINEQUAN) 10 MG capsule Take 10 mg by mouth At Bedtime       gabapentin (NEURONTIN) 300 MG capsule Take 2 capsules (600 mg) by mouth every morning AND 2 capsules (600 mg) daily at 2 pm AND 2 capsules (600 mg) At Bedtime. 180 capsule 0     ibuprofen (ADVIL/MOTRIN) 200 MG tablet Take 600 mg by mouth every 8 hours as needed for mild pain       ibuprofen (ADVIL/MOTRIN) 800 MG tablet Take 1 tablet (800 mg) by mouth every 6 hours as needed for other (mild and/or inflammatory pain) 30 tablet 0     methocarbamol (ROBAXIN) 500 MG tablet Take 1 tablet (500 mg) by mouth 4 times daily 120 tablet 0     morphine (MS CONTIN) 15 MG CR tablet Take 1 tablet (15 mg) by mouth every 12 hours 60 tablet 0     naloxone (NARCAN) 4 MG/0.1ML nasal spray Spray 1 spray (4 mg) into one nostril alternating nostrils once as needed for opioid reversal every 2-3 minutes until assistance arrives 0.2 mL 0     nicotine (NICORETTE) 2 MG gum Place 1 each (2 mg) inside cheek every hour as needed for smoking cessation 90 each 1     ondansetron (ZOFRAN-ODT) 4 MG ODT tab Take 1 tablet (4 mg) by mouth every 6 hours as needed for nausea or vomiting 120 tablet 0     oxyCODONE (ROXICODONE) 5 MG tablet Take 1-2 tablets (5-10 mg) by mouth every 6 hours as needed for moderate to severe pain 90 tablet 0     palbociclib (IBRANCE) 125 MG tablet Take 1 tablet (125 mg) by mouth daily Take for 21 days, then 7 days off. 21 tablet 0     pantoprazole (PROTONIX) 40 MG EC tablet Take 1 tablet (40 mg) by mouth every morning (before breakfast) 30 tablet 1     polyethylene  glycol (MIRALAX) 17 GM/Dose powder Take 17 g by mouth daily as needed for constipation 578 g 3     prochlorperazine (COMPAZINE) 10 MG tablet Take 1 tablet (10 mg) by mouth every 6 hours as needed (Nausea/Vomiting) 30 tablet 2     rivaroxaban ANTICOAGULANT (XARELTO) 20 MG TABS tablet Take 1 tablet (20 mg) by mouth daily (with dinner) 30 tablet 11     SENNA-docusate sodium (SENNA S) 8.6-50 MG tablet Take 2 tablets by mouth 2 times daily as needed (Constipation) 100 tablet 3     sertraline (ZOLOFT) 50 MG tablet Take 2 tablets (100 mg) by mouth daily 60 tablet 1     tolnaftate (TINACTIN) 1 % external cream Apply topically 2 times daily 30 g 1         DISPOSITION      Completed phone screen with patient. Scheduled with Dr. Tyson on 11/3/21.     Kristen Ram,

## 2021-10-05 RX ORDER — ANASTROZOLE 1 MG/1
1 TABLET ORAL DAILY
Qty: 28 TABLET | Refills: 0 | Status: SHIPPED | OUTPATIENT
Start: 2021-10-05 | End: 2021-10-12

## 2021-10-05 NOTE — PROGRESS NOTES
"Teresa is a 46 year old who is being evaluated via a billable video visit.      How would you like to obtain your AVS? MyChart  If the video visit is dropped, the invitation should be resent by: Text to cell phone: [phone]  Will anyone else be joining your video visit? No       Patient stated that she is afraid for her safety due to her mental health.     Anca Francois, ESME on 10/6/2021 at 3:31 PM      Palliative Care Progress Note    Patient Name: Teresa Sanderson  Primary Provider: No Ref-Primary, Physician    Chief Complaint/Patient ID: 45 yo F with metastatic breast cancer.  - Presented with back pain Dec. 2020, found to have lesions at L4, L5, and left iliac bone as well as paraspinal mass. Additional imaging revealed left breast mass and additional lesions in spine/pelvis. +DVT in L popliteal vein.  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  - s/p XRT to Lumbar spine (finished Jan 2021).  - Jan 2021 started Ibrance, zoladex, and anastrozole.   - 5/17/21- s/p radiofrequency ablation, keloplasty/segmental plasty of L4  - July 2021- showing complete response to treatment.  - Aug 2021- laparoscopic BSO 2/2 hormone receptor positive cancer.    Last Palliative care appointment: 8/19/21 with Dr. Rogers. Had her return to MSContin 15mg BID and continue oxycodone 5-10mg TID PRN.    -She has had several no show appointments, including missing follow up appt 9/16 with Dr. Rogers.     Reviewed: Yes, no concerns. Has not filled MScontin since 8/16.    ORT Score = 6, due to age, family hx of drug use, and schizoaffective hx    Interim History:  Teresa Sanderson 46 year old female is seen today for follow up with Palliative Care via billable video visit.      Initial plan was it was focused on her concerns with her mental health and safety.  She states her \"thought processes are not positive.  The voices in my head are negative.  I hear my name being " "called and I am having dreams of hell.  Feels like I am being punished for something I did not do\".  I asked her if this was somewhat how she felt prior to her previous mental health hospitalizations and she stated yes.  I asked her if she had any active thoughts of hurting herself or others, and she stated no she did not.  She did admit she recently had some thoughts of self-harm, however she spoke to several of her close friends including a  friend who is helping her with her bassam.  She feels that her bassam is really pulling her through, because she knows that taking her own life is forbidden, \"the unforgivable sin\".  She is excited that one of her best friends will be arriving in 2 days and will spend several days with her.      She has an appointment scheduled with psychiatry for 11/3.    Overall just feels overwhelmed by everything.  States she has a great medical team, but she just \"cannot keep up with everything\".  Feels like she has no control over her life, which makes her always sad, not able to think positively, and worsens her pain.    In addition to her mood, she is not sleeping well.  Doxepin did not help.  In the past she is also felt that trazodone was not helpful.  She was on Seroquel prior to the doxepin, but did not feel it was doing anything for sleep anymore.  She does remember being on Ambien for short time in the past, as she did find it helpful with no side effects.     Pain has been worse lately, and she does not know why.  She recently ran out of her pain medicines and is close to running out of her muscle relaxer and gabapentin.  Notes she is also taking Tylenol and ibuprofen and is using heat very regularly.  Has found the stairs at least 4 times, she feels this is because her knee gives out on her.  She had 1 session with physical therapy, but has not returned.  She is trying to go for more regular walks, however she is not able to go very far.    Both her daughter and a couple of " her friends are adamant about helping her get back on track with her medical appointments.  Implant consistent with her remember and not miss so many of her visits.    Again, at this time, she denies active thoughts of self-harm or suicide.     Social History:  Lives with her daughter. Has 5 children. Extended family in [place], [place].  Social History     Tobacco Use     Smoking status: Current Every Day Smoker     Packs/day: 0.25     Types: Cigarettes     Start date: 5/13/2011     Smokeless tobacco: Never Used   Substance Use Topics     Alcohol use: Not Currently     Comment: occ     Drug use: Yes     Types: Marijuana     Comment: occ       Family History- Reviewed in Epic.    Allergies   Allergen Reactions     Contrast Dye      Pt developed nausea after isovue 370 injection on 6/9/21      Medications- Reviewed in Epic.    Past Medical History- Reviewed in [facility].    Past Surgical History- Reviewed in Epic.    Review of Systems:   ROS: 10 point ROS neg other than the symptoms noted above in the HPI.      Physical Exam:   Constitutional: Alert, pleasant, tearful at times. Sitting up in chair.  Eyes: Sclera non-icteric, no eye discharge.  ENT: No nasal discharge. Ears grossly normal.  Respiratory: Unlabored respirations. Speaking in full sentences.  Musculoskeletal: Extremities appear normal- no gross deformities noted. No edema noted on upper body.   Skin: No suspicious lesions or rashes on visible skin.  Neurologic: Clear speech, no aphasia. No facial droop.  Psychiatric: Mentation appears normal, appropriate attention. Affect normal/bright.  Mood is sad, speech is fast but not truly pressured.  Does not seem manic to me.      Key Data Reviewed:  LABS: 9/13/21- Cr 0.84, GFR 84, Albumin 3.3, LFTs wnl. CA 27-29 at 50 (trending down). WBC 2.7, Hgb 10.6, Plts 186.     EKG 8/10/21- Ventricular rate 59 bpm, QTc 399ms    Impression & Recommendations & Counseling:  Teresa Sanderson is a 46 year old female with history of 46  year old with past medical history significant for left-sided breast cancer with metastasis to bone status post radiation therapy to spine, kyphoplasty L4, and  current treatment regimen including Ibrance and anastrozole. Most recent scans showed complete response to treatment.    Mood concerns - Emotionally overwhelmed.  Having some depressive episodes with crying as well as negative thoughts and dreams.  It sounds like she had thoughts of self-harm not that long ago, however she adamantly denies active SI.  -Addressing sleep as below, as I do feel this is a contributing factor for her.  -Encouraged her to reschedule with health psychology.  Psychiatry appointment scheduled for 11/3.  -Discussed case with oncology [facility], will plan to check in with her tomorrow.  She knows to expect this phone call.  -Discussed getting her moving in a more structured way with physical therapy.  She also has friends and family who are actively trying to get her out of the house and re-engaged in her medical care by rescheduling appointments.  -Continue sertraline 100 mg daily for now.  Due to recent concerns about possible manic type episodes, do not want to increase this.  -Discussed safety plan if she were to start having thoughts of self-harm again.  Discussed calling 911 or presenting to the emergency room.  Discussed [facility].    Difficulty sleeping - Did not respond to doxepin.  Lack of sleep is significantly impacting her other areas of life.  In the past, she tolerated Ambien and did feel that it was helpful.  -We will prescribe short-term use of Ambien 5 mg nightly to get her through until she sees psychiatry.  Would appreciate their input about adjustments to mental health medications as well as insomnia.    Ongoing back/leg pain - Confusing in situation of what appears to be improvement in her cancer.  Feel that there may be a component of total pain here.  - Continue MSContin 15mg BID, oxycodone 5-10mg TID PRN  for breakthrough pain.  - Refills provided for methocarbamol and gabapentin as well.  - Provided phone number to schedule appointment with physical therapy, as I do feel that more regular physical activity will be helpful for her pain and mental health.      Follow up: 6 weeks, however she has she can call sooner if needed.    Video-Visit Details  Video Start Time:  3:50 PM  Video End Time:  4:32 PM    Originating Location (pt. Location): Home     Distant Location (provider location): [facility]     Platform used for Video Visit: [employer]     Total time spent on day of encounter is 78 mins, including reviewing record, review of above studies, above visit with patient, symptomatic management of pain, mental health, and sleep including sending prescriptions, in-depth discussion/evaluation about mental health safety, and documentation.     Ayanna Tellez, DO  Palliative Medicine   Pager [phone], AMCOM ID [id number]    Some chart documentation performed using Dragon Voice recognition Software. Although reviewed after completion, some words and grammatical errors may remain.

## 2021-10-05 NOTE — TELEPHONE ENCOUNTER
"Spoke with Teresa today to follow up on her distressed message and to get her rescheduled for palliative care.   She is having a\"good\" day today, however still really struggles with mood and emotions.  She is having pain and not sleeping well.  Reports she is out of her meds, however is not completely clear on which ones.    After more discussion, it was determined she HAS the Ibrance and Xarelto.  And is uncertain which ones are at [facility] ready for pickup.      Writer phoned [facility] to transfer any scripts from  the [place] location to the [facility] location.    Currently ready for  is Zoloft, Seroquel, Protonix    She has an upcoming appointment with  as well.    She will have a Virtual Video visit with Dr. Adan Frank in palliative care tomorrow, at that gurmeet she will need refills of gabapentin, Robaxin, Morphine and oxycodone.     reviewed shows last fill of Morphine and Oxycodone were 8/16 and 8/26/21 and gabapentin 7/26/2021  "

## 2021-10-06 ENCOUNTER — VIRTUAL VISIT (OUTPATIENT)
Dept: ONCOLOGY | Facility: CLINIC | Age: 46
End: 2021-10-06
Attending: STUDENT IN AN ORGANIZED HEALTH CARE EDUCATION/TRAINING PROGRAM
Payer: COMMERCIAL

## 2021-10-06 DIAGNOSIS — R53.81 PHYSICAL DECONDITIONING: ICD-10-CM

## 2021-10-06 DIAGNOSIS — Z79.891 ENCOUNTER FOR LONG-TERM USE OF OPIATE ANALGESIC: ICD-10-CM

## 2021-10-06 DIAGNOSIS — M79.661 PAIN OF RIGHT LOWER LEG: ICD-10-CM

## 2021-10-06 DIAGNOSIS — F99 INSOMNIA DUE TO OTHER MENTAL DISORDER: Primary | ICD-10-CM

## 2021-10-06 DIAGNOSIS — G89.3 CANCER ASSOCIATED PAIN: ICD-10-CM

## 2021-10-06 DIAGNOSIS — F41.8 MIXED ANXIETY AND DEPRESSIVE DISORDER: ICD-10-CM

## 2021-10-06 DIAGNOSIS — C50.812 MALIGNANT NEOPLASM OF OVERLAPPING SITES OF LEFT BREAST IN FEMALE, ESTROGEN RECEPTOR POSITIVE (H): ICD-10-CM

## 2021-10-06 DIAGNOSIS — Z17.0 MALIGNANT NEOPLASM OF OVERLAPPING SITES OF LEFT BREAST IN FEMALE, ESTROGEN RECEPTOR POSITIVE (H): ICD-10-CM

## 2021-10-06 DIAGNOSIS — F51.05 INSOMNIA DUE TO OTHER MENTAL DISORDER: Primary | ICD-10-CM

## 2021-10-06 DIAGNOSIS — C79.51 CARCINOMA OF LEFT BREAST METASTATIC TO BONE (H): ICD-10-CM

## 2021-10-06 DIAGNOSIS — C79.51 METASTASIS TO BONE (H): ICD-10-CM

## 2021-10-06 DIAGNOSIS — C50.912 CARCINOMA OF LEFT BREAST METASTATIC TO BONE (H): ICD-10-CM

## 2021-10-06 DIAGNOSIS — Z86.59 HISTORY OF SCHIZOAFFECTIVE DISORDER: ICD-10-CM

## 2021-10-06 DIAGNOSIS — Z51.5 ENCOUNTER FOR PALLIATIVE CARE: ICD-10-CM

## 2021-10-06 DIAGNOSIS — R45.89 DIFFICULTY COPING: ICD-10-CM

## 2021-10-06 PROCEDURE — 99215 OFFICE O/P EST HI 40 MIN: CPT | Mod: 95 | Performed by: STUDENT IN AN ORGANIZED HEALTH CARE EDUCATION/TRAINING PROGRAM

## 2021-10-06 RX ORDER — METHOCARBAMOL 500 MG/1
500 TABLET, FILM COATED ORAL 4 TIMES DAILY
Qty: 120 TABLET | Refills: 3 | Status: SHIPPED | OUTPATIENT
Start: 2021-10-06 | End: 2021-11-04

## 2021-10-06 RX ORDER — MORPHINE SULFATE 15 MG/1
15 TABLET, FILM COATED, EXTENDED RELEASE ORAL EVERY 12 HOURS
Qty: 60 TABLET | Refills: 0 | Status: SHIPPED | OUTPATIENT
Start: 2021-10-06 | End: 2021-11-17

## 2021-10-06 RX ORDER — GABAPENTIN 300 MG/1
CAPSULE ORAL
Qty: 180 CAPSULE | Refills: 0 | Status: SHIPPED | OUTPATIENT
Start: 2021-10-06 | End: 2022-03-04

## 2021-10-06 RX ORDER — ZOLPIDEM TARTRATE 5 MG/1
5 TABLET ORAL
Qty: 30 TABLET | Refills: 1 | Status: SHIPPED | OUTPATIENT
Start: 2021-10-06 | End: 2021-11-26

## 2021-10-06 RX ORDER — OXYCODONE HYDROCHLORIDE 5 MG/1
5-10 TABLET ORAL EVERY 6 HOURS PRN
Qty: 90 TABLET | Refills: 0 | Status: SHIPPED | OUTPATIENT
Start: 2021-10-06 | End: 2021-11-17

## 2021-10-06 NOTE — PATIENT INSTRUCTIONS
Recommendations:  - Refilled the pain medications as well as a muscle relaxer and gabapentin.  Recommend taking oxycodone no more than 3 times daily.  - I sent a prescription for Ambien, as this worked well for you in the past.  My hope is that this is a short-term medication to get you a little bit better sleep until you are able to see psychiatry and have some adjustments to your other medications made.  - Please reschedule your appointment with vascular surgery and set up a new appointment with physical therapy.  - One of the clinical social workers will be checking in with you to see how you are doing.  I also recommend rescheduling an appointment with Dr. Ha Drake, we will psychologist.  - If you feel yourself to being into crisis mode again, or you start to have more feelings of self-harm, please go to the emergency room.  If possible, would recommend the [facility].    Follow up: 6 weeks, but please call with any concerns prior to this.      Reasons to Call    If you are having worsening/uncontrolled symptoms we want you to call!    You or your other physicians make any changes to medications we have prescribed.  -Please call for refills 4-5 days before you will run out of medication.    Important Phone Numbers    [facility] - Aisha Shelton. Palliative Care RN - [phone]    [facility] - Betty Daly. Palliative Care RN - [phone]  *For Refills, scheduling, and general questions - [phone]  *After hours or on weekends. Will connect you with on call MD. [phone]

## 2021-10-06 NOTE — LETTER
10/6/2021         RE: Teresa Sanderson  [address] [address]        Dear Colleague,    Thank you for referring your patient, Teresa Sanderson, to the [facility] CANCER [facility]. Please see a copy of my visit note below.    Teresa is a 46 year old who is being evaluated via a billable video visit.      How would you like to obtain your AVS? MyChart  If the video visit is dropped, the invitation should be resent by: Text to cell phone: [phone]  Will anyone else be joining your video visit? No       Patient stated that she is afraid for her safety due to her mental health.     Anca Francois, CMA on 10/6/2021 at 3:31 PM      Palliative Care Progress Note    Patient Name: Teresa Sanderson  Primary Provider: No Ref-Primary, Physician    Chief Complaint/Patient ID: 45 yo F with metastatic breast cancer.  - Presented with back pain Dec. 2020, found to have lesions at L4, L5, and left iliac bone as well as paraspinal mass. Additional imaging revealed left breast mass and additional lesions in spine/pelvis. +DVT in L popliteal vein.  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  - s/p XRT to Lumbar spine (finished Jan 2021).  - Jan 2021 started Ibrance, zoladex, and anastrozole.   - 5/17/21- s/p radiofrequency ablation, keloplasty/segmental plasty of L4  - July 2021- showing complete response to treatment.  - Aug 2021- laparoscopic BSO 2/2 hormone receptor positive cancer.    Last Palliative care appointment: 8/19/21 with Dr. Rogers. Had her return to MSContin 15mg BID and continue oxycodone 5-10mg TID PRN.    -She has had several no show appointments, including missing follow up appt 9/16 with Dr. Rogers.     Reviewed: Yes, no concerns. Has not filled MScontin since 8/16.    ORT Score = 6, due to age, family hx of drug use, and schizoaffective hx    Interim History:  Teresa Sanderson 46 year old female is seen today  "for follow up with Palliative Care via billable video visit.      Initial plan was it was focused on her concerns with her mental health and safety.  She states her \"thought processes are not positive.  The voices in my head are negative.  I hear my name being called and I am having dreams of hell.  Feels like I am being punished for something I did not do\".  I asked her if this was somewhat how she felt prior to her previous mental health hospitalizations and she stated yes.  I asked her if she had any active thoughts of hurting herself or others, and she stated no she did not.  She did admit she recently had some thoughts of self-harm, however she spoke to several of her close friends including a  friend who is helping her with her bassam.  She feels that her bassam is really pulling her through, because she knows that taking her own life is forbidden, \"the unforgivable sin\".  She is excited that one of her best friends will be arriving in 2 days and will spend several days with her.      She has an appointment scheduled with psychiatry for 11/3.    Overall just feels overwhelmed by everything.  States she has a great medical team, but she just \"cannot keep up with everything\".  Feels like she has no control over her life, which makes her always sad, not able to think positively, and worsens her pain.    In addition to her mood, she is not sleeping well.  Doxepin did not help.  In the past she is also felt that trazodone was not helpful.  She was on Seroquel prior to the doxepin, but did not feel it was doing anything for sleep anymore.  She does remember being on Ambien for short time in the past, as she did find it helpful with no side effects.     Pain has been worse lately, and she does not know why.  She recently ran out of her pain medicines and is close to running out of her muscle relaxer and gabapentin.  Notes she is also taking Tylenol and ibuprofen and is using heat very regularly.  Has found the " stairs at least 4 times, she feels this is because her knee gives out on her.  She had 1 session with physical therapy, but has not returned.  She is trying to go for more regular walks, however she is not able to go very far.    Both her daughter and a couple of her friends are adamant about helping her get back on track with her medical appointments.  Implant consistent with her remember and not miss so many of her visits.    Again, at this time, she denies active thoughts of self-harm or suicide.     Social History:  Lives with her daughter. Has 5 children. Extended family in [place], [place].  Social History     Tobacco Use     Smoking status: Current Every Day Smoker     Packs/day: 0.25     Types: Cigarettes     Start date: 5/13/2011     Smokeless tobacco: Never Used   Substance Use Topics     Alcohol use: Not Currently     Comment: occ     Drug use: Yes     Types: Marijuana     Comment: occ       Family History- Reviewed in Epic.    Allergies   Allergen Reactions     Contrast Dye      Pt developed nausea after isovue 370 injection on 6/9/21      Medications- Reviewed in Epic.    Past Medical History- Reviewed in [facility].    Past Surgical History- Reviewed in Epic.    Review of Systems:   ROS: 10 point ROS neg other than the symptoms noted above in the HPI.      Physical Exam:   Constitutional: Alert, pleasant, tearful at times. Sitting up in chair.  Eyes: Sclera non-icteric, no eye discharge.  ENT: No nasal discharge. Ears grossly normal.  Respiratory: Unlabored respirations. Speaking in full sentences.  Musculoskeletal: Extremities appear normal- no gross deformities noted. No edema noted on upper body.   Skin: No suspicious lesions or rashes on visible skin.  Neurologic: Clear speech, no aphasia. No facial droop.  Psychiatric: Mentation appears normal, appropriate attention. Affect normal/bright.  Mood is sad, speech is fast but not truly pressured.  Does not seem manic to me.      Key Data Reviewed:  LABS: 9/13/21-  Cr 0.84, GFR 84, Albumin 3.3, LFTs wnl. CA 27-29 at 50 (trending down). WBC 2.7, Hgb 10.6, Plts 186.     EKG 8/10/21- Ventricular rate 59 bpm, QTc 399ms    Impression & Recommendations & Counseling:  Teresa Sanderson is a 46 year old female with history of 46 year old with past medical history significant for left-sided breast cancer with metastasis to bone status post radiation therapy to spine, kyphoplasty L4, and  current treatment regimen including Ibrance and anastrozole. Most recent scans showed complete response to treatment.    Mood concerns - Emotionally overwhelmed.  Having some depressive episodes with crying as well as negative thoughts and dreams.  It sounds like she had thoughts of self-harm not that long ago, however she adamantly denies active SI.  -Addressing sleep as below, as I do feel this is a contributing factor for her.  -Encouraged her to reschedule with health psychology.  Psychiatry appointment scheduled for 11/3.  -Discussed case with oncology [facility], will plan to check in with her tomorrow.  She knows to expect this phone call.  -Discussed getting her moving in a more structured way with physical therapy.  She also has friends and family who are actively trying to get her out of the house and re-engaged in her medical care by rescheduling appointments.  -Continue sertraline 100 mg daily for now.  Due to recent concerns about possible manic type episodes, do not want to increase this.  -Discussed safety plan if she were to start having thoughts of self-harm again.  Discussed calling 911 or presenting to the emergency room.  Discussed [facility].    Difficulty sleeping - Did not respond to doxepin.  Lack of sleep is significantly impacting her other areas of life.  In the past, she tolerated Ambien and did feel that it was helpful.  -We will prescribe short-term use of Ambien 5 mg nightly to get her through until she sees psychiatry.  Would appreciate their input about adjustments  to mental health medications as well as insomnia.    Ongoing back/leg pain - Confusing in situation of what appears to be improvement in her cancer.  Feel that there may be a component of total pain here.  - Continue MSContin 15mg BID, oxycodone 5-10mg TID PRN for breakthrough pain.  - Refills provided for methocarbamol and gabapentin as well.  - Provided phone number to schedule appointment with physical therapy, as I do feel that more regular physical activity will be helpful for her pain and mental health.      Follow up: 6 weeks, however she has she can call sooner if needed.    Video-Visit Details  Video Start Time:  3:50 PM  Video End Time:  4:32 PM    Originating Location (pt. Location): Home     Distant Location (provider location): [facility] CLINIC     Platform used for Video Visit: Well Done     Total time spent on day of encounter is 78 mins, including reviewing record, review of above studies, above visit with patient, symptomatic management of pain, mental health, and sleep including sending prescriptions, in-depth discussion/evaluation about mental health safety, and documentation.     Ayanna Tellez DO  Palliative Medicine   Pager [phone], AMCOM ID [id number]    Some chart documentation performed using Dragon Voice recognition Software. Although reviewed after completion, some words and grammatical errors may remain.        Again, thank you for allowing me to participate in the care of your patient.        Sincerely,        Ayanna Tellez DO

## 2021-10-06 NOTE — LETTER
10/6/2021         RE: Teresa Sanderson  [address] [address]        Dear Colleague,    Thank you for referring your patient, Teresa Sanderson, to the [facility]. Please see a copy of my visit note below.    Teresa is a 46 year old who is being evaluated via a billable video visit.      How would you like to obtain your AVS? MyChart  If the video visit is dropped, the invitation should be resent by: Text to cell phone: [phone]  Will anyone else be joining your video visit? No       Patient stated that she is afraid for her safety due to her mental health.     Anca Francois, CMA on 10/6/2021 at 3:31 PM      Palliative Care Progress Note    Patient Name: Teresa Sanderson  Primary Provider: No Ref-Primary, Physician    Chief Complaint/Patient ID: 47 yo F with metastatic breast cancer.  - Presented with back pain Dec. 2020, found to have lesions at L4, L5, and left iliac bone as well as paraspinal mass. Additional imaging revealed left breast mass and additional lesions in spine/pelvis. +DVT in L popliteal vein.  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  - s/p XRT to Lumbar spine (finished Jan 2021).  - Jan 2021 started Ibrance, zoladex, and anastrozole.   - 5/17/21- s/p radiofrequency ablation, keloplasty/segmental plasty of L4  - July 2021- showing complete response to treatment.  - Aug 2021- laparoscopic BSO 2/2 hormone receptor positive cancer.    Last Palliative care appointment: 8/19/21 with Dr. Rogers. Had her return to MSContin 15mg BID and continue oxycodone 5-10mg TID PRN.    -She has had several no show appointments, including missing follow up appt 9/16 with Dr. Rogers.     Reviewed: Yes, no concerns. Has not filled MScontin since 8/16.    ORT Score = 6, due to age, family hx of drug use, and schizoaffective hx    Interim History:  Teresa Sanderson 46 year old female is seen today  "for follow up with Palliative Care via billable video visit.      Initial plan was it was focused on her concerns with her mental health and safety.  She states her \"thought processes are not positive.  The voices in my head are negative.  I hear my name being called and I am having dreams of hell.  Feels like I am being punished for something I did not do\".  I asked her if this was somewhat how she felt prior to her previous mental health hospitalizations and she stated yes.  I asked her if she had any active thoughts of hurting herself or others, and she stated no she did not.  She did admit she recently had some thoughts of self-harm, however she spoke to several of her close friends including a  friend who is helping her with her bassam.  She feels that her bassam is really pulling her through, because she knows that taking her own life is forbidden, \"the unforgivable sin\".  She is excited that one of her best friends will be arriving in 2 days and will spend several days with her.      She has an appointment scheduled with psychiatry for 11/3.    Overall just feels overwhelmed by everything.  States she has a great medical team, but she just \"cannot keep up with everything\".  Feels like she has no control over her life, which makes her always sad, not able to think positively, and worsens her pain.    In addition to her mood, she is not sleeping well.  Doxepin did not help.  In the past she is also felt that trazodone was not helpful.  She was on Seroquel prior to the doxepin, but did not feel it was doing anything for sleep anymore.  She does remember being on Ambien for short time in the past, as she did find it helpful with no side effects.     Pain has been worse lately, and she does not know why.  She recently ran out of her pain medicines and is close to running out of her muscle relaxer and gabapentin.  Notes she is also taking Tylenol and ibuprofen and is using heat very regularly.  Has found the " stairs at least 4 times, she feels this is because her knee gives out on her.  She had 1 session with physical therapy, but has not returned.  She is trying to go for more regular walks, however she is not able to go very far.    Both her daughter and a couple of her friends are adamant about helping her get back on track with her medical appointments.  Implant consistent with her remember and not miss so many of her visits.    Again, at this time, she denies active thoughts of self-harm or suicide.     Social History:  Lives with her daughter. Has 5 children. Extended family in [place], [place].  Social History     Tobacco Use     Smoking status: Current Every Day Smoker     Packs/day: 0.25     Types: Cigarettes     Start date: 5/13/2011     Smokeless tobacco: Never Used   Substance Use Topics     Alcohol use: Not Currently     Comment: occ     Drug use: Yes     Types: Marijuana     Comment: occ       Family History- Reviewed in Epic.    Allergies   Allergen Reactions     Contrast Dye      Pt developed nausea after isovue 370 injection on 6/9/21      Medications- Reviewed in Epic.    Past Medical History- Reviewed in [facility].    Past Surgical History- Reviewed in Epic.    Review of Systems:   ROS: 10 point ROS neg other than the symptoms noted above in the HPI.      Physical Exam:   Constitutional: Alert, pleasant, tearful at times. Sitting up in chair.  Eyes: Sclera non-icteric, no eye discharge.  ENT: No nasal discharge. Ears grossly normal.  Respiratory: Unlabored respirations. Speaking in full sentences.  Musculoskeletal: Extremities appear normal- no gross deformities noted. No edema noted on upper body.   Skin: No suspicious lesions or rashes on visible skin.  Neurologic: Clear speech, no aphasia. No facial droop.  Psychiatric: Mentation appears normal, appropriate attention. Affect normal/bright.  Mood is sad, speech is fast but not truly pressured.  Does not seem manic to me.      Key Data Reviewed:  LABS: 9/13/21-  Cr 0.84, GFR 84, Albumin 3.3, LFTs wnl. CA 27-29 at 50 (trending down). WBC 2.7, Hgb 10.6, Plts 186.     EKG 8/10/21- Ventricular rate 59 bpm, QTc 399ms    Impression & Recommendations & Counseling:  Teresa Sanderson is a 46 year old female with history of 46 year old with past medical history significant for left-sided breast cancer with metastasis to bone status post radiation therapy to spine, kyphoplasty L4, and  current treatment regimen including Ibrance and anastrozole. Most recent scans showed complete response to treatment.    Mood concerns - Emotionally overwhelmed.  Having some depressive episodes with crying as well as negative thoughts and dreams.  It sounds like she had thoughts of self-harm not that long ago, however she adamantly denies active SI.  -Addressing sleep as below, as I do feel this is a contributing factor for her.  -Encouraged her to reschedule with health psychology.  Psychiatry appointment scheduled for 11/3.  -Discussed case with oncology [facility], will plan to check in with her tomorrow.  She knows to expect this phone call.  -Discussed getting her moving in a more structured way with physical therapy.  She also has friends and family who are actively trying to get her out of the house and re-engaged in her medical care by rescheduling appointments.  -Continue sertraline 100 mg daily for now.  Due to recent concerns about possible manic type episodes, do not want to increase this.  -Discussed safety plan if she were to start having thoughts of self-harm again.  Discussed calling 911 or presenting to the emergency room.  Discussed [facility].    Difficulty sleeping - Did not respond to doxepin.  Lack of sleep is significantly impacting her other areas of life.  In the past, she tolerated Ambien and did feel that it was helpful.  -We will prescribe short-term use of Ambien 5 mg nightly to get her through until she sees psychiatry.  Would appreciate their input about adjustments  to mental health medications as well as insomnia.    Ongoing back/leg pain - Confusing in situation of what appears to be improvement in her cancer.  Feel that there may be a component of total pain here.  - Continue MSContin 15mg BID, oxycodone 5-10mg TID PRN for breakthrough pain.  - Refills provided for methocarbamol and gabapentin as well.  - Provided phone number to schedule appointment with physical therapy, as I do feel that more regular physical activity will be helpful for her pain and mental health.      Follow up: 6 weeks, however she has she can call sooner if needed.    Video-Visit Details  Video Start Time:  3:50 PM  Video End Time:  4:32 PM    Originating Location (pt. Location): Home     Distant Location (provider location): [facility] CANCER CLINIC     Platform used for Video Visit: Talenz     Total time spent on day of encounter is 78 mins, including reviewing record, review of above studies, above visit with patient, symptomatic management of pain, mental health, and sleep including sending prescriptions, in-depth discussion/evaluation about mental health safety, and documentation.     Ayanna Tellze DO  Palliative Medicine   Pager [phone], AMCOM ID [id number]    Some chart documentation performed using Dragon Voice recognition Software. Although reviewed after completion, some words and grammatical errors may remain.        Again, thank you for allowing me to participate in the care of your patient.        Sincerely,        Ayanna Tellez DO

## 2021-10-07 ENCOUNTER — PATIENT OUTREACH (OUTPATIENT)
Dept: CARE COORDINATION | Facility: CLINIC | Age: 46
End: 2021-10-07

## 2021-10-07 RX ORDER — PALBOCICLIB 125 MG/1
TABLET, FILM COATED ORAL
Qty: 21 TABLET | Refills: 0 | OUTPATIENT
Start: 2021-10-07

## 2021-10-10 DIAGNOSIS — C79.51 CARCINOMA OF LEFT BREAST METASTATIC TO BONE (H): Primary | ICD-10-CM

## 2021-10-10 DIAGNOSIS — C50.812 MALIGNANT NEOPLASM OF OVERLAPPING SITES OF LEFT BREAST IN FEMALE, ESTROGEN RECEPTOR POSITIVE (H): ICD-10-CM

## 2021-10-10 DIAGNOSIS — Z17.0 MALIGNANT NEOPLASM OF OVERLAPPING SITES OF LEFT BREAST IN FEMALE, ESTROGEN RECEPTOR POSITIVE (H): ICD-10-CM

## 2021-10-10 DIAGNOSIS — C50.912 CARCINOMA OF LEFT BREAST METASTATIC TO BONE (H): Primary | ICD-10-CM

## 2021-10-11 ENCOUNTER — HEALTH MAINTENANCE LETTER (OUTPATIENT)
Age: 46
End: 2021-10-11

## 2021-10-12 ENCOUNTER — APPOINTMENT (OUTPATIENT)
Dept: LAB | Facility: CLINIC | Age: 46
End: 2021-10-12
Attending: INTERNAL MEDICINE
Payer: COMMERCIAL

## 2021-10-12 ENCOUNTER — INFUSION THERAPY VISIT (OUTPATIENT)
Dept: ONCOLOGY | Facility: CLINIC | Age: 46
End: 2021-10-12
Attending: INTERNAL MEDICINE
Payer: COMMERCIAL

## 2021-10-12 VITALS
SYSTOLIC BLOOD PRESSURE: 124 MMHG | HEART RATE: 62 BPM | OXYGEN SATURATION: 99 % | BODY MASS INDEX: 32.33 KG/M2 | WEIGHT: 238.4 LBS | RESPIRATION RATE: 16 BRPM | TEMPERATURE: 97.8 F | DIASTOLIC BLOOD PRESSURE: 83 MMHG

## 2021-10-12 DIAGNOSIS — C50.812 MALIGNANT NEOPLASM OF OVERLAPPING SITES OF LEFT BREAST IN FEMALE, ESTROGEN RECEPTOR POSITIVE (H): ICD-10-CM

## 2021-10-12 DIAGNOSIS — C79.51 CARCINOMA OF LEFT BREAST METASTATIC TO BONE (H): Primary | ICD-10-CM

## 2021-10-12 DIAGNOSIS — B35.3 TINEA PEDIS OF BOTH FEET: ICD-10-CM

## 2021-10-12 DIAGNOSIS — C50.912 CARCINOMA OF LEFT BREAST METASTATIC TO BONE (H): Primary | ICD-10-CM

## 2021-10-12 DIAGNOSIS — Z17.0 MALIGNANT NEOPLASM OF OVERLAPPING SITES OF LEFT BREAST IN FEMALE, ESTROGEN RECEPTOR POSITIVE (H): ICD-10-CM

## 2021-10-12 LAB
ALBUMIN SERPL-MCNC: 3.5 G/DL (ref 3.4–5)
ALP SERPL-CCNC: 122 U/L (ref 40–150)
ALT SERPL W P-5'-P-CCNC: 20 U/L (ref 0–50)
ANION GAP SERPL CALCULATED.3IONS-SCNC: 2 MMOL/L (ref 3–14)
AST SERPL W P-5'-P-CCNC: 12 U/L (ref 0–45)
BASOPHILS # BLD AUTO: 0 10E3/UL (ref 0–0.2)
BASOPHILS NFR BLD AUTO: 1 %
BILIRUB SERPL-MCNC: 0.5 MG/DL (ref 0.2–1.3)
BUN SERPL-MCNC: 12 MG/DL (ref 7–30)
CALCIUM SERPL-MCNC: 9.3 MG/DL (ref 8.5–10.1)
CANCER AG27-29 SERPL-ACNC: 59 U/ML (ref 0–39)
CEA SERPL-MCNC: 2.7 UG/L (ref 0–2.5)
CHLORIDE BLD-SCNC: 111 MMOL/L (ref 94–109)
CO2 SERPL-SCNC: 25 MMOL/L (ref 20–32)
CREAT SERPL-MCNC: 0.79 MG/DL (ref 0.52–1.04)
EOSINOPHIL # BLD AUTO: 0 10E3/UL (ref 0–0.7)
EOSINOPHIL NFR BLD AUTO: 1 %
ERYTHROCYTE [DISTWIDTH] IN BLOOD BY AUTOMATED COUNT: 16.8 % (ref 10–15)
GFR SERPL CREATININE-BSD FRML MDRD: 90 ML/MIN/1.73M2
GLUCOSE BLD-MCNC: 140 MG/DL (ref 70–99)
HCT VFR BLD AUTO: 35.8 % (ref 35–47)
HGB BLD-MCNC: 11.6 G/DL (ref 11.7–15.7)
IMM GRANULOCYTES # BLD: 0 10E3/UL
IMM GRANULOCYTES NFR BLD: 0 %
LYMPHOCYTES # BLD AUTO: 1.1 10E3/UL (ref 0.8–5.3)
LYMPHOCYTES NFR BLD AUTO: 31 %
MCH RBC QN AUTO: 30.1 PG (ref 26.5–33)
MCHC RBC AUTO-ENTMCNC: 32.4 G/DL (ref 31.5–36.5)
MCV RBC AUTO: 93 FL (ref 78–100)
MONOCYTES # BLD AUTO: 0.3 10E3/UL (ref 0–1.3)
MONOCYTES NFR BLD AUTO: 8 %
NEUTROPHILS # BLD AUTO: 2.1 10E3/UL (ref 1.6–8.3)
NEUTROPHILS NFR BLD AUTO: 59 %
NRBC # BLD AUTO: 0 10E3/UL
NRBC BLD AUTO-RTO: 0 /100
PLATELET # BLD AUTO: 294 10E3/UL (ref 150–450)
POTASSIUM BLD-SCNC: 3.5 MMOL/L (ref 3.4–5.3)
PROT SERPL-MCNC: 7.9 G/DL (ref 6.8–8.8)
RBC # BLD AUTO: 3.86 10E6/UL (ref 3.8–5.2)
SODIUM SERPL-SCNC: 138 MMOL/L (ref 133–144)
WBC # BLD AUTO: 3.6 10E3/UL (ref 4–11)

## 2021-10-12 PROCEDURE — G0463 HOSPITAL OUTPT CLINIC VISIT: HCPCS | Mod: 25

## 2021-10-12 PROCEDURE — 36415 COLL VENOUS BLD VENIPUNCTURE: CPT

## 2021-10-12 PROCEDURE — 82378 CARCINOEMBRYONIC ANTIGEN: CPT

## 2021-10-12 PROCEDURE — 86300 IMMUNOASSAY TUMOR CA 15-3: CPT

## 2021-10-12 PROCEDURE — 80053 COMPREHEN METABOLIC PANEL: CPT

## 2021-10-12 PROCEDURE — 85025 COMPLETE CBC W/AUTO DIFF WBC: CPT

## 2021-10-12 PROCEDURE — 36592 COLLECT BLOOD FROM PICC: CPT

## 2021-10-12 RX ORDER — ANASTROZOLE 1 MG/1
1 TABLET ORAL DAILY
Qty: 28 TABLET | Refills: 2 | Status: SHIPPED | OUTPATIENT
Start: 2021-10-12 | End: 2021-11-23

## 2021-10-12 RX ORDER — ZOLEDRONIC ACID 0.04 MG/ML
4 INJECTION, SOLUTION INTRAVENOUS ONCE
Status: DISCONTINUED | OUTPATIENT
Start: 2021-10-12 | End: 2021-10-12 | Stop reason: HOSPADM

## 2021-10-12 RX ORDER — THERMOMETER, ELECTRONIC,ORAL
EACH MISCELLANEOUS 2 TIMES DAILY
Qty: 30 G | Refills: 1 | Status: SHIPPED | OUTPATIENT
Start: 2021-10-12 | End: 2021-11-17

## 2021-10-12 ASSESSMENT — PAIN SCALES - GENERAL: PAINLEVEL: SEVERE PAIN (7)

## 2021-10-12 NOTE — NURSING NOTE
Chief Complaint   Patient presents with     Blood Draw     Labs drawn via PIV placed by RN in lab. VS taken.      Labs drawn from PIV placed by RN. Line flushed with saline. Vitals taken. Pt checked in for appointment(s).    Lili Taylor RN

## 2021-10-12 NOTE — PROGRESS NOTES
"Infusion Nursing Note:  Teresa Sanderson presents today for Zometa Infusion CANCELED.        Note: Teresa comes to infusion today with no questions or concerns.  She has lower back pain that she rates at 7/10 today and denies any need for intervention at this appointment.  She uses pain medication at home that decreases the pain and make it much easier for her to get her ADLs done.  She has been afebrile and denies signs and symptoms of infection including: cough, SOB, sore throat, diarrhea, vomiting, rash, or pain with urination.        Alee Yang noted on her August visit that pt had a dental abscess and she held her zometa that day.  When asked about the abscess today she admits she still has some pain there but the abscess has \"cleared up\"  However, she has not seen a dentist since her visit with Alee in August.  She called at that that time but the soonest she could get in was November 10th which she does have an appt made for.    Teresa also states that she continues to have a foot rash that she discussed with Dr Plunkett at their appointment in September and was prescribed at tolnaftate cream but she had never picked it up.  The rash continue today.      Dr Plunkett contacted about he above information.    TORB: Please continue to hold zometa until pt has seen the dentist on 11/10/21.   Ok to reorder tolnaftate cream for pt's rash.    Pt will  calcium/vit d at her local pharmacy and start to take it.       Intravenous Access:  Peripheral IV placed in lab     Treatment Conditions:    Results for TERESA SANDERSON (MRN [id number]) as of 10/12/2021 10:25   Ref. Range 10/12/2021 09:40   Sodium Latest Ref Range: 133 - 144 mmol/L 138   Potassium Latest Ref Range: 3.4 - 5.3 mmol/L 3.5   Chloride Latest Ref Range: 94 - 109 mmol/L 111 (H)   Carbon Dioxide Latest Ref Range: 20 - 32 mmol/L 25   Urea Nitrogen Latest Ref Range: 7 - 30 mg/dL 12   Creatinine Latest Ref Range: 0.52 - 1.04 mg/dL 0.79 "   GFR Estimate Latest Ref Range: >60 mL/min/1.73m2 90   Calcium Latest Ref Range: 8.5 - 10.1 mg/dL 9.3   Anion Gap Latest Ref Range: 3 - 14 mmol/L 2 (L)   Albumin Latest Ref Range: 3.4 - 5.0 g/dL 3.5   Protein Total Latest Ref Range: 6.8 - 8.8 g/dL 7.9   Bilirubin Total Latest Ref Range: 0.2 - 1.3 mg/dL 0.5   Alkaline Phosphatase Latest Ref Range: 40 - 150 U/L 122   ALT Latest Ref Range: 0 - 50 U/L 20   AST Latest Ref Range: 0 - 45 U/L 12   Glucose Latest Ref Range: 70 - 99 mg/dL 140 (H)           Discharge Plan:     AVS to patient via [employer].  Patient will return 11/9/21 to see Dr Plunkett.  A message was sent to scheduling to schedule a zometa infusion on 11/11 or 11/12.    Ally Stevens RN

## 2021-10-12 NOTE — PROGRESS NOTES
Social Work Follow-Up Encounter Visit  Oncology Clinic    Data/Intervention:  Patient Name:  Teresa Sanderson  /Age:  1975 (46 year old)    Reason for Follow-Up:  Mental health supports    Collaborated With:    -Patient  -Ru Hernandez, oncology psychologist      Resources Provided:  Counseling resources:  [place] mobile crisis teams can come to where you are  Available 24 hours a day, seven days a week, 365 days a year  Come to your home, school or other public place  Calm the situation and talk through ways to provide support and help you to decide what to do next  Call [phone]     Crisis Text Line for the state of [place] can text for support by text  Text  [place]  to 059066     [facility]:  Free No appointment needed. Meet in person or by phone. Speak with a licensed professional.  [email or web address]  Main office number: [phone]     Housing Resources:   This is the website I spoke of that allows you to search for affordable/ low income housing . It also lists some housing assistance programs at the bottom of their web page that may be helpful.   HousingLink - Find Your New Affordable Rental Apartment In ...https://www.Evochalink.org     This is the public housing we spoke of it often has a waiting list. However it doesn't hurt to be placed on the waiting. This one is for [facility]. Each [facility] has their own public housing assistance/section 8 program. If you do not have preference for one [facility] then you could reach out to the other [place] to be placed on their wait list or fill out an application for their public housing assistance. You can do this by either calling the other counties or looking on each [facility]'s website for the application and or waiting list.   Public Housing Assistance/ Section 8 https://[email or web address] Cent[facility].org/section-8/   [phone]     Adult shelter connect is the place you would contact for [facility] for shelter needs. When you call this place they will be  "able to tell you which shelters having openings and provide you with a voucher to get into the shelter.   ADULT SHELTER [phone]  10:00 am - 5:30 pm Monday through Friday  After hours Call 211 (mobile: [phone]) and ask for the after-hours shelter team     This is the contact information for [facility]  for [facility] housing services.   The [facility] [phone]  [facility] provides the following housing services: \"coppied from their website\"  Emergency Housing  When life throws a curveball, you can still count on a warm bed to sleep in. If you ve lost your housing and need immediate help, you can stay at one of our emergency housing facilities. In cities where we don t have facilities, we will refer you to partner programs or provide financial assistance to cover nightly housing costs, when available.     Transitional Housing  When you need a place to stay during the  in-between,  our transitional housing program offers shelter until you find a permanent place to live. If you ve been evicted, are dealing with an addiction, face a domestic break-up or have another type of housing crisis, contact your local [facility] to find out what transitional housing options are available in your area. We offer temporary housing and support services for up to six months to low-income single men and women, and some areas are able to accommodate young adults and families.      Permanent Supportive Housing  We want to help people find a place to call home. When you need a long-term housing solution, one of our permanent housing facilities may be a good fit. Our housing options range from supportive housing facilities for seniors to apartments for vulnerable young adults learning independence. We also have complexes for homeless adults and families.      Assessment:  SW following up on referral from patient's care team reached out to patient regarding mental " health supports. Patient discussed discomfort in their home environment. Patient reports living with a family member, family member's teenage/young adult children also reside in the home with patient. Patient states the children have guests over frequently, the children are active in the community and do not wear masks. Patient expressed concerns for their health and safety. Patient expressed concerns about their risk of exposure. Patient reports wearing a mask in common areas of the house. Patient states they try to remain in their room as much as possible. Patient reports this stressful living environment has impacted their mood as they are anxious about leaving their room and being around their family members. Patient repots feeling depressed with being isolated and not having their own home. Patient expressed strong desire to find their own place, in hopes they will to feel safer  In their home environment. SW provided validation, reassurance and brief supportive counseling. SW asked about housing resources previously sent to patient. Patient reports they did see them and have not had a chance to follow up on the resources yet. SW offered to resend the resources and asked patient the best way to send them. Patient reported H[employer]i was the best way to communicate resources to them. SW assessed patient's mood and safety. Patient reported feeling support by their daughter and finds support in their bassam. SW inquired about counseling services. Patient stated they have an appointment they are looking forward to on 11/3. SW asked if it were possible for patient to be seen sooner would they prefer an earlier appointment. Patient stated they would prefer an earlier appointment if possible. SW agreed to reach out to [facility] oncology psychologist about scheduling patient for an earlier appointment. SW discussed with patient additional mental health supports available to them and assessed patient's willingness and  ability to follow up on and utilize such supports. Patient showed interest in utilizing supports, reported feeling comfortable and able to use mental health supports as needed. SW provided these in HOSTINGt message with housing resources per patient's request. No further needs were identified by patient at this time. Patient reports currently feeling supported by care team, their daughter and is reassured with resources and a plan for additional mental health support. No further safety concerns were identified at this time.     Plan:  Previously provided patient/family with writer's contact information and availability.   SW contacted Ru Juliana- who agreed to reach out to patient and schedule an earlier appointment than 11/3.   Patient to follow up on resources provided as needed.   SW will remain available as needed.     Jo CORTEZ, EUGENE  - Oncology  Phone : [phone]  Pager: [phone]

## 2021-11-03 ENCOUNTER — VIRTUAL VISIT (OUTPATIENT)
Dept: PSYCHIATRY | Facility: CLINIC | Age: 46
End: 2021-11-03
Attending: PSYCHIATRY & NEUROLOGY
Payer: COMMERCIAL

## 2021-11-03 DIAGNOSIS — F30.9 MANIA (H): ICD-10-CM

## 2021-11-03 DIAGNOSIS — F33.1 MODERATE EPISODE OF RECURRENT MAJOR DEPRESSIVE DISORDER (H): Primary | ICD-10-CM

## 2021-11-03 PROCEDURE — 90792 PSYCH DIAG EVAL W/MED SRVCS: CPT | Mod: 95 | Performed by: PSYCHIATRY & NEUROLOGY

## 2021-11-03 RX ORDER — QUETIAPINE FUMARATE 100 MG/1
TABLET, FILM COATED ORAL
Qty: 14 TABLET | Refills: 1 | Status: SHIPPED | OUTPATIENT
Start: 2021-11-03 | End: 2021-11-16

## 2021-11-03 ASSESSMENT — PAIN SCALES - GENERAL: PAINLEVEL: EXTREME PAIN (8)

## 2021-11-03 NOTE — PATIENT INSTRUCTIONS
**For crisis resources, please see the information at the end of this document**     Patient Education      Treatment Plan Today:   Separate Tetherball message sent with needed info    1) Medications- start Seroquel 100mg tab- take 1/2 tab (50mg) for one night then take  1 tab (100mg) for 3 nights then increase to one and a half tabs (150mg) at night    2) Follow-up appts at the [facility] as follows:  11/4     9:00 AM  with Dr. Edward  for urgent follow-up  11/16   2:00 PM  with Dr. Tyson for routine follow-up    3) Crisis numbers are below     ------------------------------------------------------------------------    Thank you for coming to the [facility]    Lab Testing:  If you had lab testing today and your results are reassuring or normal they will be mailed to you or sent through Tetherball within 7 days. If the lab tests need quick action we will call you with the results. The phone number we will call with results is # [phone] (home) . If this is not the best number please call our clinic and change the number.    Medication Refills:  If you need any refills please call your pharmacy and they will contact us. Our fax number for refills is [phone]. Please allow three business for refill processing. If you need to  your refill at a new pharmacy, please contact the new pharmacy directly. The new pharmacy will help you get your medications transferred.     Scheduling:  If you have any concerns about today's visit or wish to schedule another appointment please call our office during normal business hours [phone] (8-5:00 M-F)    Contact Us:  Please call [phone] during business hours (8-5:00 M-F).  If after clinic hours, or on the weekend, please call  [phone].    Financial Assistance [phone]  SmartCare system Billing [phone]  Central Billing Office, [employer]: [phone]  [facility] Billing [phone]  Medical Records [phone]  [facility] Patient Bill of  Rights [email or web address]       MENTAL HEALTH CRISIS NUMBERS:  For a medical emergency please call  911 or go to the nearest ER.     [facility] ([facility]) [facility] ER  [phone]    [facility]:   [facility] -[phone]   Crisis Residence [facility] -[phone]   Walk-In Counseling Center [facility] -[phone]   COPE 24/7 [place] Mobile Team -[phone] (adults)/[phone] (child)  CHILD: [facility] needs assessment team - [phone]      [facility]:   [facility] - [phone]   Walk-in counseling [facility] - [phone]   Walk-in counseling [facility] - [phone]   Crisis Residence [facility] - [phone]  Urgent Care Adult Mental [place]-[phone] mobile unit/ 24/7 crisis line    National Crisis Numbers:   National Suicide Prevention Lifeline: 1-334-174-TALK (341-333-6012)  Poison Control Center - 1-915.249.8184  Xooker/resources for a list of additional resources (SOS)  Trans Lifeline a hotline for transgender people 1-974.340.1357  The [employer] Project a hotline for LGBT youth 1-649.932.8483  Crisis Text Line: For any crisis 24/7   To: 216811  see www.crisistextline.org  - IF MAKING A CALL FEELS TOO HARD, send a text!       Again thank you for choosing [facility] and please let us know how we can best partner with you to improve you and your family's health.    You may be receiving a survey regarding this appointment. We would love to have your feedback, both positive and negative. The survey is done by an external company, so your answers are anonymous.
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
## 2021-11-03 NOTE — PROGRESS NOTES
"VIDEO VISIT  Teresa Sanderson is a 46 year old patient that has consented to receive services via billable video visit.      The patient has been notified of following:   \"This video visit will be conducted via a call between you and your physician/provider. We have found that certain health care needs can be provided without the need for an in-person physical exam. This service lets us provide the care you need with a video conversation. If a prescription is necessary we can send it directly to your pharmacy. If lab work is needed we can place an order for that and you can then stop by our lab to have the test done at a later time. Insurers are generally covering virtual visits as they would in-office visits so billing should not be different than normal.  If for some reason you do get billed incorrectly, you should contact the billing office to correct it and that number is in the AVS .    Patient will join video visit via:  Blue Calypso (Patient / guardian confirmed to join via Blue Calypso)    If patient attempts to join the video via Cognitive Matchhart at appointment start time, but is unable to, they would prefer that the provider send them a video invitation via:   Text to preferred phone: [phone]      How would patient like to obtain AVS?:  MyChart    "

## 2021-11-04 ENCOUNTER — TELEPHONE (OUTPATIENT)
Dept: PSYCHIATRY | Facility: CLINIC | Age: 46
End: 2021-11-04
Payer: COMMERCIAL

## 2021-11-04 ENCOUNTER — TELEPHONE (OUTPATIENT)
Dept: PSYCHIATRY | Facility: CLINIC | Age: 46
End: 2021-11-04

## 2021-11-04 NOTE — TELEPHONE ENCOUNTER
try again for tomorrow with my pt  Received: Today  Karlee Tyson MD  P Psychiatry Intake-U[facility]; Conrad Edward MD; Vicenta Mack [facility]; Abby Nj, RICHARD  Cc: Zack Oro, RN  Everyone   Please call pt again (intake folks) and get her on Dr Edward's schedule for tomorrow 11/5 at   0915  60min   She missed today's appt.   If she can't come in (which is my strong preference), video is better than nothing and phone is still better than nothing sigrid before a weekend.     Em   Please call her to see if she got on the Seroquel, got my message with crisis numbers and see if there are acute concerns for today     Thanks all!   D     Follow up:     Writer reached out to patient to connect regarding above request. No answer at number provided. LVM, requesting a call back. Clinic number provided.

## 2021-11-04 NOTE — TELEPHONE ENCOUNTER
On 11/4/21, at 855, writer called patient at [phone] and [phone] to confirm Virtual Visit. Writer unable to make contact with patient. Also an email for echo was sent to [email or web address].  Ashleigh Mack CMA

## 2021-11-04 NOTE — TELEPHONE ENCOUNTER
Writer placed a call to patient to connect with below request. No answer at number provided. LVM, requesting a call back. Clinic number provided.

## 2021-11-05 NOTE — TELEPHONE ENCOUNTER
Made additional attempt to reach pt re: appointment today. No answer. LVM requesting pt return writer's phone call for brief check-in.

## 2021-11-09 ENCOUNTER — ONCOLOGY VISIT (OUTPATIENT)
Dept: ONCOLOGY | Facility: CLINIC | Age: 46
End: 2021-11-09
Attending: INTERNAL MEDICINE
Payer: COMMERCIAL

## 2021-11-09 DIAGNOSIS — Z91.199 FAILURE TO ATTEND APPOINTMENT: ICD-10-CM

## 2021-11-09 DIAGNOSIS — C79.51 CARCINOMA OF LEFT BREAST METASTATIC TO BONE (H): Primary | ICD-10-CM

## 2021-11-09 DIAGNOSIS — C50.912 CARCINOMA OF LEFT BREAST METASTATIC TO BONE (H): Primary | ICD-10-CM

## 2021-11-09 NOTE — LETTER
Date:December 23, 2021      Provider requested that no letter be sent. Do not send.       [facility]

## 2021-11-09 NOTE — LETTER
11/9/2021         RE: Teresa Sanderson  [address] [address]        Dear Colleague,    Thank you for referring your patient, Teresa Sanderson, to the [facility]. Please see a copy of my visit note below.    Patient no showed for today's visit.  She has a visit with Alee Yang on 11/23/2021.    This encounter was opened in error. Please disregard.      Again, thank you for allowing me to participate in the care of your patient.        Sincerely,        Jahaira Plunkett MD

## 2021-11-12 NOTE — TELEPHONE ENCOUNTER
Made additional attempt to reach pt for brief check-in, specifically regarding whether pt started Seroquel and to remind her of upcoming appointment with Dr. Tyson on 11/16. No answer; LVM requesting a return phone call.

## 2021-11-16 ENCOUNTER — VIRTUAL VISIT (OUTPATIENT)
Dept: PSYCHIATRY | Facility: CLINIC | Age: 46
End: 2021-11-16
Attending: PSYCHIATRY & NEUROLOGY
Payer: COMMERCIAL

## 2021-11-16 DIAGNOSIS — C50.812 MALIGNANT NEOPLASM OF OVERLAPPING SITES OF LEFT BREAST IN FEMALE, ESTROGEN RECEPTOR POSITIVE (H): ICD-10-CM

## 2021-11-16 DIAGNOSIS — G89.3 CANCER ASSOCIATED PAIN: ICD-10-CM

## 2021-11-16 DIAGNOSIS — C79.51 CARCINOMA OF LEFT BREAST METASTATIC TO BONE (H): ICD-10-CM

## 2021-11-16 DIAGNOSIS — F29 PSYCHOSIS, UNSPECIFIED PSYCHOSIS TYPE (H): Primary | ICD-10-CM

## 2021-11-16 DIAGNOSIS — Z17.0 MALIGNANT NEOPLASM OF OVERLAPPING SITES OF LEFT BREAST IN FEMALE, ESTROGEN RECEPTOR POSITIVE (H): ICD-10-CM

## 2021-11-16 DIAGNOSIS — C50.912 CARCINOMA OF LEFT BREAST METASTATIC TO BONE (H): ICD-10-CM

## 2021-11-16 PROCEDURE — 99215 OFFICE O/P EST HI 40 MIN: CPT | Mod: 95 | Performed by: PSYCHIATRY & NEUROLOGY

## 2021-11-16 RX ORDER — OLANZAPINE 2.5 MG/1
TABLET, FILM COATED ORAL
Qty: 30 TABLET | Refills: 0 | Status: SHIPPED | OUTPATIENT
Start: 2021-11-16 | End: 2021-11-23

## 2021-11-16 RX ORDER — ANASTROZOLE 1 MG/1
1 TABLET ORAL DAILY
Qty: 28 TABLET | Refills: 2 | Status: CANCELLED | OUTPATIENT
Start: 2021-11-16

## 2021-11-16 RX ORDER — OXYCODONE HYDROCHLORIDE 5 MG/1
5-10 TABLET ORAL EVERY 6 HOURS PRN
Qty: 90 TABLET | Refills: 0 | Status: CANCELLED | OUTPATIENT
Start: 2021-11-16

## 2021-11-16 ASSESSMENT — PAIN SCALES - GENERAL: PAINLEVEL: EXTREME PAIN (8)

## 2021-11-16 NOTE — PATIENT INSTRUCTIONS
**For crisis resources, please see the information at the end of this document**     Patient Education      Treatment Plan Today:     1) Medications-  -do not start Seroquel (do not  from pharm)  -remain off Ambien (zolpidem)  -remain off doxepin  -continue Zoloft 50mg tab- 2 tabs (100mg) a day  -initiate Zyprexa 2.5mg tab- one tab (2.5mg) x 1day then go 2.5mg BID    2) Follow-up appt with Dr Tyson     Nov 23   4:30    3) Crisis numbers are below  AND address for the ER    Located in: [facility]  Address: [address] [address]  Phone: [phone]    ------------------------------------------------------------------------    Thank you for coming to the [facility]    Lab Testing:  If you had lab testing today and your results are reassuring or normal they will be mailed to you or sent through Rapportive within 7 days. If the lab tests need quick action we will call you with the results. The phone number we will call with results is # [phone] (home) . If this is not the best number please call our clinic and change the number.    Medication Refills:  If you need any refills please call your pharmacy and they will contact us. Our fax number for refills is [phone]. Please allow three business for refill processing. If you need to  your refill at a new pharmacy, please contact the new pharmacy directly. The new pharmacy will help you get your medications transferred.     Scheduling:  If you have any concerns about today's visit or wish to schedule another appointment please call our office during normal business hours [phone] (8-5:00 M-F)    Contact Us:  Please call [phone] during business hours (8-5:00 M-F).  If after clinic hours, or on the weekend, please call  [phone].    Financial Assistance [phone]  [facility] Billing [phone]  Central Billing Office, [facility]: [phone]  [place]  Billing [phone]  Medical Records [phone]  [facility] Patient Bill of Rights [email or web address]       MENTAL HEALTH CRISIS NUMBERS:  For a medical emergency please call  911 or go to the nearest ER.     [facility] ([facility]) [facility] ER  [phone]    [facility]:   [facility] -[phone]   Crisis Residence [facility] Residence -[phone]   Walk-In Counseling Center [facility] -[phone]   COPE 24/7 [place] Mobile Team -[phone] (adults)/[phone] (child)  CHILD: [facility] needs assessment team - [phone]      [facility]:   [facility] - [phone]   Walk-in counseling [facility] - [phone]   Walk-in counseling [facility] - [phone]   Crisis Residence [facility] Res[facility] - [phone]  Urgent Care Adult Mental Cni[facility]-[phone] mobile unit/ 24/7 crisis line    National Crisis Numbers:   National Suicide Prevention Lifeline: 4-026-092-TALK (286-759-1689)  Poison Control Center - 4-832-865-6082  Zaldiva/resources for a list of additional resources (SOS)  Trans Lifeline a hotline for transgender people 7-306-215-8296  The [employer] a hotline for LGBT youth 5-862-391-9955  Crisis Text Line: For any crisis 24/7   To: 337615  see www.crisistextline.org  - IF MAKING A CALL FEELS TOO HARD, send a text!       Again thank you for choosing [facility] and please let us know how we can best partner with you to improve you and your family's health.    You may be receiving a survey regarding this appointment. We would love to have your feedback, both positive and negative. The survey is done by an external company, so your answers are anonymous.

## 2021-11-16 NOTE — PROGRESS NOTES
"VIDEO VISIT  Teresa Sanderson is a 46 year old patient that has consented to receive services via billable video visit.      The patient has been notified of following:   \"This video visit will be conducted via a call between you and your physician/provider. We have found that certain health care needs can be provided without the need for an in-person physical exam. This service lets us provide the care you need with a video conversation. If a prescription is necessary we can send it directly to your pharmacy. If lab work is needed we can place an order for that and you can then stop by our lab to have the test done at a later time. Insurers are generally covering virtual visits as they would in-office visits so billing should not be different than normal.  If for some reason you do get billed incorrectly, you should contact the billing office to correct it and that number is in the AVS .    Patient will join video visit via:  text invite was sent (Voddlerhart is preferred, but patient is unable to join via mobile melting gmbh)    If patient attempts to join the video via Voddlerhart at appointment start time, but is unable to, they would prefer that the provider send them a video invitation via:   Send to preferred e-mail: [email or web address]      How would patient like to obtain AVS?:  MyChart      "

## 2021-11-16 NOTE — Clinical Note
Hi   Can you see if you can get in touch with her tomorrow?   I need her to take the Zyprexa and would like to know if she picked it up.  Thanks!  And, make sure she got these crisis numbers from this visit.
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
## 2021-11-17 ENCOUNTER — VIRTUAL VISIT (OUTPATIENT)
Dept: ONCOLOGY | Facility: CLINIC | Age: 46
End: 2021-11-17
Attending: STUDENT IN AN ORGANIZED HEALTH CARE EDUCATION/TRAINING PROGRAM
Payer: COMMERCIAL

## 2021-11-17 DIAGNOSIS — M17.11 OSTEOARTHRITIS OF RIGHT KNEE, UNSPECIFIED OSTEOARTHRITIS TYPE: ICD-10-CM

## 2021-11-17 DIAGNOSIS — G89.3 CANCER ASSOCIATED PAIN: ICD-10-CM

## 2021-11-17 DIAGNOSIS — C79.51 CARCINOMA OF LEFT BREAST METASTATIC TO BONE (H): ICD-10-CM

## 2021-11-17 DIAGNOSIS — R45.89 DIFFICULTY COPING: ICD-10-CM

## 2021-11-17 DIAGNOSIS — M25.551 HIP PAIN, RIGHT: Primary | ICD-10-CM

## 2021-11-17 DIAGNOSIS — C50.912 CARCINOMA OF LEFT BREAST METASTATIC TO BONE (H): ICD-10-CM

## 2021-11-17 DIAGNOSIS — Z86.59 HISTORY OF SCHIZOAFFECTIVE DISORDER: ICD-10-CM

## 2021-11-17 DIAGNOSIS — B35.3 TINEA PEDIS OF BOTH FEET: ICD-10-CM

## 2021-11-17 DIAGNOSIS — R53.81 PHYSICAL DECONDITIONING: ICD-10-CM

## 2021-11-17 PROCEDURE — 99215 OFFICE O/P EST HI 40 MIN: CPT | Mod: 95 | Performed by: STUDENT IN AN ORGANIZED HEALTH CARE EDUCATION/TRAINING PROGRAM

## 2021-11-17 RX ORDER — OXYCODONE HYDROCHLORIDE 5 MG/1
5-10 TABLET ORAL EVERY 6 HOURS PRN
Qty: 90 TABLET | Refills: 0 | Status: SHIPPED | OUTPATIENT
Start: 2021-11-17 | End: 2021-12-29

## 2021-11-17 RX ORDER — THERMOMETER, ELECTRONIC,ORAL
EACH MISCELLANEOUS 2 TIMES DAILY
Qty: 30 G | Refills: 1 | Status: SHIPPED | OUTPATIENT
Start: 2021-11-17 | End: 2022-05-13

## 2021-11-17 RX ORDER — MORPHINE SULFATE 15 MG/1
15 TABLET, FILM COATED, EXTENDED RELEASE ORAL EVERY 12 HOURS
Qty: 60 TABLET | Refills: 0 | Status: SHIPPED | OUTPATIENT
Start: 2021-11-17 | End: 2021-12-29

## 2021-11-17 NOTE — LETTER
"    11/17/2021         RE: Teresa Sanderson  [address] [facility] [address]        Dear Colleague,    Thank you for referring your patient, Teresa Sanderson, to the [facility]. Please see a copy of my visit note below.    Teresa is a 46 year old who is being evaluated via a billable video visit.      How would you like to obtain your AVS? MyChart  If the video visit is dropped, the invitation should be resent by: Text to cell phone: [phone]  Will anyone else be joining your video visit? No          Palliative Care Progress Note    Patient Name: Teresa Sanderson  Primary Provider: No Ref-Primary, Physician    Chief Complaint/Patient ID: 45 yo F with metastatic breast cancer.  - Presented with back pain Dec. 2020, found to have lesions at L4, L5, and left iliac bone as well as paraspinal mass. Additional imaging revealed left breast mass and additional lesions in spine/pelvis. +DVT in L popliteal vein.  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  - s/p XRT to Lumbar spine (finished Jan 2021).  - Jan 2021 started Ibrance, zoladex, and anastrozole.   - 5/17/21- s/p radiofrequency ablation, keloplasty/segmental plasty of L4  - July 2021- showing complete response to treatment.  - Aug 2021- laparoscopic BSO 2/2 hormone receptor positive cancer.    -Hx of schizoaffective disorder with some episodes of severe psychosis, most recently in 2019. Has re-established with Psychiatry.    Last Palliative care appointment: 10/6/21 with me.      Reviewed: Yes, no concerns     ORT Score = 6, due to age, family hx of drug use, and schizoaffective hx    Interim History:  Teresa Sanderson 46 year old female is seen today for follow up with Palliative Care via billable video visit.     Says that she is \"up-and-down\".  Saw psychiatry yesterday, this was her third visit.  Planning to start Zyprexa for both mood and sleep.    She " "notes ongoing issues with some pain, however this seems different than her prior bone pain from her treatments.  She states that type of pain was \"controllable\".  She is not urinating more significant pain in her right knee but includes it feeling like it is going to slip out of place.  She notes 2 falls while she was out in public recently.  Does note a history of osteoarthritis in her knee.  She also has some point tenderness over the outside of her right hip.  Has been applying heating pad with some benefit.  Ran out of her oxycodone 2 days ago.  Can take 2 Tylenol daily, does not feel it has been very helpful for her.     Having increased difficulty getting up from a seated position, including some stiffness in her back.  States she never attended physical therapy appointments.    Ongoing rash on her feet, says Oncology prescribed a cream but it wasn't at the pharmacy she was expecting. Per chart review, was dx with tinea pedis and RX for Tinactin was sent.     Social History:  Lives with her daughter who serves as her PCA. Has 5 children. Extended family in [place], [place].  Social History     Tobacco Use     Smoking status: Current Every Day Smoker     Packs/day: 0.25     Types: Cigarettes     Start date: 5/13/2011     Smokeless tobacco: Never Used   Substance Use Topics     Alcohol use: Not Currently     Comment: occ     Drug use: Yes     Types: Marijuana     Comment: occ       Family History- Reviewed in Epic.    Allergies   Allergen Reactions     Contrast Dye      Pt developed nausea after isovue 370 injection on 6/9/21      Medications- Reviewed in Epic.    Past Medical History- Reviewed in [facility].    Past Surgical History- Reviewed in Epic.    Review of Systems:   ROS: 10 point ROS neg other than the symptoms noted above in the HPI.    Physical Exam:   Constitutional: Alert, pleasant, no apparent distress. Sitting up in chair.  Eyes: Sclera non-icteric, no eye discharge.  ENT: No nasal discharge. Ears grossly " normal.  Respiratory: Unlabored respirations. Speaking in full sentences.  Musculoskeletal: Extremities appear normal- no gross deformities noted. No edema noted on upper body.   Skin: Bilateral feet with hyperpigmented and dry, peeling skin visible on video.  Neurologic: Clear speech, no aphasia. No facial droop.  Psychiatric: Mentation appears normal, appropriate attention. Affect normal/bright. Does not appear anxious or depressed.      Key Data Reviewed:  LABS: 10/12/2021- Cr 0.79, Albumin 3.5, LFTs WNL.  CA 27-29 of 59 (slightly higher). WBC 3.6, Hgb 11.6, Plts 294.     No new imaging.    Impression & Recommendations & Counseling:  Teresa Sanderson is a 46 year old female with history of left-sided breast cancer with metastasis to bone status post radiation therapy to spine, kyphoplasty L4, and  current treatment regimen including Ibrance and anastrozole. Most recent scans showed complete response to treatment.  She additionally has a history of schizoaffective disorder.    Pain - Has some bony pain from cancer related therapies and metastatic disease.  Based on description, likely acutely worsening pains 2/2 combination of osteoarthritis versus bursitis of the right greater trochanter versus overall deconditioning.  She never established with physical therapy.  -We will continue MS Contin 15 mg BID for now.  -Oxycodone 5-10mg every 6 hours as needed for breakthrough pain.  -Discussed the need for her to call us ahead of time when she needs refills so she does not run out of medication.  -Recommended scheduling visit with physical therapy.  Provided phone number from referral in June.  If she needs a new referral, she will know.  -Trial of Voltaren gel applied to the right greater trochanter.  -Recommended knee brace to help provide stability.    Tinea pedis - Was never able to get prescription sent by oncology in September.  I sent a new prescription for the Tinactin cream to be applied twice daily.    Mood  concerns  Difficulty sleeping - Has establish with psychiatry and working with Dr. Tyson on medication adjustments.  Planning trial of Zyprexa.    Follow up: About 2 months.    Video-Visit Details  Video Start Time:  3:54 PM  Video End Time:  4:24 PM    Originating Location (pt. Location): Home     Distant Location (provider location): [facility]ER [facility]     Platform used for Video Visit: Initially AmWell, then switched to Doximity due to issues with sound.     Total time spent on day of encounter is 52 mins, including reviewing record, review of above studies, above visit with patient, discussion of symptoms related pain, fatigue/deconditioning, foot rash, and mood including prescription management, and documentation.     Ayanna Tellez DO  Palliative Medicine   Pager [phone], [facility] ID [id number]    Some chart documentation performed using Dragon Voice recognition Software. Although reviewed after completion, some words and grammatical errors may remain.        Again, thank you for allowing me to participate in the care of your patient.        Sincerely,        Ayanna Tellez DO

## 2021-11-17 NOTE — PATIENT INSTRUCTIONS
Recommendations:  -Please give us a call 4-5 days before you are due to run out of your pain medications so we can get you refills on time.  -Recommend calling Physical therapy at [phone] to set up your first visit. If you need a new referral, please let me know.  -Agree with trying a knee brace for the knee pain/instablity. Physical therapy should also help with the knee, hip, and back pains.  -Try the topical voltaren gel for the hip pain. You can rub this in up to 4 times daily.  -I sent the prescription for the foot rash cream that Oncology had recommended.    Follow up: About 2 months      Reasons to Call    If you are having worsening/uncontrolled symptoms we want you to call!    You or your other physicians make any changes to medications we have prescribed.  -Please call for refills 4-5 days before you will run out of medication.    Important Phone Numbers    [facility] - Aisha Shelton. Palliative Care RN - [phone]    [facility] [place]. Palliative Care RN - [phone]  *For Refills, scheduling, and general questions - [phone]  *After hours or on weekends. Will connect you with on call MD. [phone]

## 2021-11-17 NOTE — LETTER
"    11/17/2021         RE: Teresa Sanderson  [address] [facility] [address]        Dear Colleague,    Thank you for referring your patient, Teresa Sanderson, to the [facility]. Please see a copy of my visit note below.    Teresa is a 46 year old who is being evaluated via a billable video visit.      How would you like to obtain your AVS? MyChart  If the video visit is dropped, the invitation should be resent by: Text to cell phone: [phone]  Will anyone else be joining your video visit? No          Palliative Care Progress Note    Patient Name: Teresa Sanderson  Primary Provider: No Ref-Primary, Physician    Chief Complaint/Patient ID: 45 yo F with metastatic breast cancer.  - Presented with back pain Dec. 2020, found to have lesions at L4, L5, and left iliac bone as well as paraspinal mass. Additional imaging revealed left breast mass and additional lesions in spine/pelvis. +DVT in L popliteal vein.  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  - s/p XRT to Lumbar spine (finished Jan 2021).  - Jan 2021 started Ibrance, zoladex, and anastrozole.   - 5/17/21- s/p radiofrequency ablation, keloplasty/segmental plasty of L4  - July 2021- showing complete response to treatment.  - Aug 2021- laparoscopic BSO 2/2 hormone receptor positive cancer.    -Hx of schizoaffective disorder with some episodes of severe psychosis, most recently in 2019. Has re-established with Psychiatry.    Last Palliative care appointment: 10/6/21 with me.      Reviewed: Yes, no concerns     ORT Score = 6, due to age, family hx of drug use, and schizoaffective hx    Interim History:  Teresa Sanderson 46 year old female is seen today for follow up with Palliative Care via billable video visit.     Says that she is \"up-and-down\".  Saw psychiatry yesterday, this was her third visit.  Planning to start Zyprexa for both mood and sleep.    She " "notes ongoing issues with some pain, however this seems different than her prior bone pain from her treatments.  She states that type of pain was \"controllable\".  She is not urinating more significant pain in her right knee but includes it feeling like it is going to slip out of place.  She notes 2 falls while she was out in public recently.  Does note a history of osteoarthritis in her knee.  She also has some point tenderness over the outside of her right hip.  Has been applying heating pad with some benefit.  Ran out of her oxycodone 2 days ago.  Can take 2 Tylenol daily, does not feel it has been very helpful for her.     Having increased difficulty getting up from a seated position, including some stiffness in her back.  States she never attended physical therapy appointments.    Ongoing rash on her feet, says Oncology prescribed a cream but it wasn't at the pharmacy she was expecting. Per chart review, was dx with tinea pedis and RX for Tinactin was sent.     Social History:  Lives with her daughter who serves as her PCA. Has 5 children. Extended family in [place], [place].  Social History     Tobacco Use     Smoking status: Current Every Day Smoker     Packs/day: 0.25     Types: Cigarettes     Start date: 5/13/2011     Smokeless tobacco: Never Used   Substance Use Topics     Alcohol use: Not Currently     Comment: occ     Drug use: Yes     Types: Marijuana     Comment: occ       Family History- Reviewed in Epic.    Allergies   Allergen Reactions     Contrast Dye      Pt developed nausea after isovue 370 injection on 6/9/21      Medications- Reviewed in Epic.    Past Medical History- Reviewed in [facility].    Past Surgical History- Reviewed in Epic.    Review of Systems:   ROS: 10 point ROS neg other than the symptoms noted above in the HPI.    Physical Exam:   Constitutional: Alert, pleasant, no apparent distress. Sitting up in chair.  Eyes: Sclera non-icteric, no eye discharge.  ENT: No nasal discharge. Ears grossly " normal.  Respiratory: Unlabored respirations. Speaking in full sentences.  Musculoskeletal: Extremities appear normal- no gross deformities noted. No edema noted on upper body.   Skin: Bilateral feet with hyperpigmented and dry, peeling skin visible on video.  Neurologic: Clear speech, no aphasia. No facial droop.  Psychiatric: Mentation appears normal, appropriate attention. Affect normal/bright. Does not appear anxious or depressed.      Key Data Reviewed:  LABS: 10/12/2021- Cr 0.79, Albumin 3.5, LFTs WNL.  CA 27-29 of 59 (slightly higher). WBC 3.6, Hgb 11.6, Plts 294.     No new imaging.    Impression & Recommendations & Counseling:  Teresa Sanderson is a 46 year old female with history of left-sided breast cancer with metastasis to bone status post radiation therapy to spine, kyphoplasty L4, and  current treatment regimen including Ibrance and anastrozole. Most recent scans showed complete response to treatment.  She additionally has a history of schizoaffective disorder.    Pain - Has some bony pain from cancer related therapies and metastatic disease.  Based on description, likely acutely worsening pains 2/2 combination of osteoarthritis versus bursitis of the right greater trochanter versus overall deconditioning.  She never established with physical therapy.  -We will continue MS Contin 15 mg BID for now.  -Oxycodone 5-10mg every 6 hours as needed for breakthrough pain.  -Discussed the need for her to call us ahead of time when she needs refills so she does not run out of medication.  -Recommended scheduling visit with physical therapy.  Provided phone number from referral in June.  If she needs a new referral, she will know.  -Trial of Voltaren gel applied to the right greater trochanter.  -Recommended knee brace to help provide stability.    Tinea pedis - Was never able to get prescription sent by oncology in September.  I sent a new prescription for the Tinactin cream to be applied twice daily.    Mood  concerns  Difficulty sleeping - Has establish with psychiatry and working with Dr. Tyson on medication adjustments.  Planning trial of Zyprexa.    Follow up: About 2 months.    Video-Visit Details  Video Start Time:  3:54 PM  Video End Time:  4:24 PM    Originating Location (pt. Location): Home     Distant Location (provider location): [facility]     Platform used for Video Visit: Initially AmWell, then switched to Doximity due to issues with sound.     Total time spent on day of encounter is 52 mins, including reviewing record, review of above studies, above visit with patient, discussion of symptoms related pain, fatigue/deconditioning, foot rash, and mood including prescription management, and documentation.     Ayanna Tellez DO  Palliative Medicine   Pager [phone], [facility] ID [id number]    Some chart documentation performed using Dragon Voice recognition Software. Although reviewed after completion, some words and grammatical errors may remain.        Again, thank you for allowing me to participate in the care of your patient.        Sincerely,        Ayanna Tellez DO

## 2021-11-17 NOTE — PROGRESS NOTES
"Teresa is a 46 year old who is being evaluated via a billable video visit.      How would you like to obtain your AVS? MyChart  If the video visit is dropped, the invitation should be resent by: Text to cell phone: [phone]  Will anyone else be joining your video visit? No          Palliative Care Progress Note    Patient Name: Teresa Sanderson  Primary Provider: No Ref-Primary, Physician    Chief Complaint/Patient ID: 47 yo F with metastatic breast cancer.  - Presented with back pain Dec. 2020, found to have lesions at L4, L5, and left iliac bone as well as paraspinal mass. Additional imaging revealed left breast mass and additional lesions in spine/pelvis. +DVT in L popliteal vein.  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  - s/p XRT to Lumbar spine (finished Jan 2021).  - Jan 2021 started Ibrance, zoladex, and anastrozole.   - 5/17/21- s/p radiofrequency ablation, keloplasty/segmental plasty of L4  - July 2021- showing complete response to treatment.  - Aug 2021- laparoscopic BSO 2/2 hormone receptor positive cancer.    -Hx of schizoaffective disorder with some episodes of severe psychosis, most recently in 2019. Has re-established with Psychiatry.    Last Palliative care appointment: 10/6/21 with me.      Reviewed: Yes, no concerns     ORT Score = 6, due to age, family hx of drug use, and schizoaffective hx    Interim History:  Teresa Sanderson 46 year old female is seen today for follow up with Palliative Care via billable video visit.     Says that she is \"up-and-down\".  Saw psychiatry yesterday, this was her third visit.  Planning to start Zyprexa for both mood and sleep.    She notes ongoing issues with some pain, however this seems different than her prior bone pain from her treatments.  She states that type of pain was \"controllable\".  She is not urinating more significant pain in her right knee but includes it feeling like it is going to " slip out of place.  She notes 2 falls while she was out in public recently.  Does note a history of osteoarthritis in her knee.  She also has some point tenderness over the outside of her right hip.  Has been applying heating pad with some benefit.  Ran out of her oxycodone 2 days ago.  Can take 2 Tylenol daily, does not feel it has been very helpful for her.     Having increased difficulty getting up from a seated position, including some stiffness in her back.  States she never attended physical therapy appointments.    Ongoing rash on her feet, says Oncology prescribed a cream but it wasn't at the pharmacy she was expecting. Per chart review, was dx with tinea pedis and RX for Tinactin was sent.     Social History:  Lives with her daughter who serves as her PCA. Has 5 children. Extended family in [place], [place].  Social History     Tobacco Use     Smoking status: Current Every Day Smoker     Packs/day: 0.25     Types: Cigarettes     Start date: 5/13/2011     Smokeless tobacco: Never Used   Substance Use Topics     Alcohol use: Not Currently     Comment: occ     Drug use: Yes     Types: Marijuana     Comment: occ       Family History- Reviewed in Epic.    Allergies   Allergen Reactions     Contrast Dye      Pt developed nausea after isovue 370 injection on 6/9/21      Medications- Reviewed in Epic.    Past Medical History- Reviewed in [facility].    Past Surgical History- Reviewed in Epic.    Review of Systems:   ROS: 10 point ROS neg other than the symptoms noted above in the HPI.    Physical Exam:   Constitutional: Alert, pleasant, no apparent distress. Sitting up in chair.  Eyes: Sclera non-icteric, no eye discharge.  ENT: No nasal discharge. Ears grossly normal.  Respiratory: Unlabored respirations. Speaking in full sentences.  Musculoskeletal: Extremities appear normal- no gross deformities noted. No edema noted on upper body.   Skin: Bilateral feet with hyperpigmented and dry, peeling skin visible on  video.  Neurologic: Clear speech, no aphasia. No facial droop.  Psychiatric: Mentation appears normal, appropriate attention. Affect normal/bright. Does not appear anxious or depressed.      Key Data Reviewed:  LABS: 10/12/2021- Cr 0.79, Albumin 3.5, LFTs WNL.  CA 27-29 of 59 (slightly higher). WBC 3.6, Hgb 11.6, Plts 294.     No new imaging.    Impression & Recommendations & Counseling:  Teresa Sanderson is a 46 year old female with history of left-sided breast cancer with metastasis to bone status post radiation therapy to spine, kyphoplasty L4, and  current treatment regimen including Ibrance and anastrozole. Most recent scans showed complete response to treatment.  She additionally has a history of schizoaffective disorder.    Pain - Has some bony pain from cancer related therapies and metastatic disease.  Based on description, likely acutely worsening pains 2/2 combination of osteoarthritis versus bursitis of the right greater trochanter versus overall deconditioning.  She never established with physical therapy.  -We will continue MS Contin 15 mg BID for now.  -Oxycodone 5-10mg every 6 hours as needed for breakthrough pain.  -Discussed the need for her to call us ahead of time when she needs refills so she does not run out of medication.  -Recommended scheduling visit with physical therapy.  Provided phone number from referral in June.  If she needs a new referral, she will know.  -Trial of Voltaren gel applied to the right greater trochanter.  -Recommended knee brace to help provide stability.    Tinea pedis - Was never able to get prescription sent by oncology in September.  I sent a new prescription for the Tinactin cream to be applied twice daily.    Mood concerns  Difficulty sleeping - Has establish with psychiatry and working with Dr. Tyson on medication adjustments.  Planning trial of Zyprexa.    Follow up: About 2 months.    Video-Visit Details  Video Start Time:  3:54 PM  Video End Time:  4:24  PM    Originating Location (pt. Location): Home     Distant Location (provider location): [facility]     Platform used for Video Visit: Initially AmWell, then switched to Doximity due to issues with sound.     Total time spent on day of encounter is 52 mins, including reviewing record, review of above studies, above visit with patient, discussion of symptoms related pain, fatigue/deconditioning, foot rash, and mood including prescription management, and documentation.     Ayanna Tellez,   Palliative Medicine   Pager [phone], [facility] ID [id number]    Some chart documentation performed using Dragon Voice recognition Software. Although reviewed after completion, some words and grammatical errors may remain.

## 2021-11-19 ENCOUNTER — TELEPHONE (OUTPATIENT)
Dept: PSYCHIATRY | Facility: CLINIC | Age: 46
End: 2021-11-19
Payer: COMMERCIAL

## 2021-11-19 NOTE — TELEPHONE ENCOUNTER
--Writer spoke with pt via phone to confirm she was able to pick-up rx for olanzapine 2.5mg tablets. Pt reports she is on the way to pick her daughter up and will then be stopping by the pharmacy to  refill. She was encouraged to contact writer should she have any issues picking up the medication today.  --Writer also informed pt that Dr. Tyson would like to make sure pt has access to crisis phone numbers. Pt requested these be sent to her via [employer]. See separate MyChart encounter for follow-up.

## 2021-11-19 NOTE — TELEPHONE ENCOUNTER
Writer attempted x2 to check with pharmacy to ensure medication was filled. Unable to reach pharmacy after waiting on hold >20 minutes each time.

## 2021-11-20 ENCOUNTER — DOCUMENTATION ONLY (OUTPATIENT)
Dept: PSYCHIATRY | Facility: CLINIC | Age: 46
End: 2021-11-20
Payer: COMMERCIAL

## 2021-11-20 NOTE — PROGRESS NOTES
[facility]    I called the pt today to make sure she picked up the Zyprexa and had started taking it -this did happen. We planned for her to increase to two tabs today (as the Rx states) and if it makes her sleepy during the day she will take both tabs at night starting tomorrow. No symptom worsening as of today and she feels confident she will access emergency services if needed. She appreciated the call today as well as yesterday from Abby, my RN partner. I told the pt we will stay in close touch until she starts to feel better and she understands that the hospital is an option if needed. Feels safe at home at this time. I will see her for a follow-up visit on Nov 23.  MD Derrell

## 2021-11-22 NOTE — PROGRESS NOTES
Oncology Visit:   Date on this visit: Nov 23, 2021    Diagnosis:  ER positive left breast cancer metastasized to bones.     Primary Physician: No Ref-Primary, Physician     History Of Present Illness:    Ms. Sanderson is a 46 year old female with a h/o tobacco abuse and DVTs with left breast cancer metastasized to bone. She presented to [facility] ED with back pain on 12/5/2020. MRI of the L-spine showed an abnormal L4 lesion with associated right paraspinal mass, abnormalities in L5 and the left iliac bone were also seen. CT C/A/P showed a left breast mass, lytic lesions of T7, L4, and the pelvis, and a 3 cm lesion in the kidney (thought to be a cyst). Ultrasound of the bilateral lower extremities showed a non-occlusive thrombus in the left popliteal vein. Mammogram and ultrasound of the bilateral breasts on 12/17/2020 showed a spiculated mass measuring at least 7.8 cm at 12-1:00 left breast extending from the nipple to 9 cm from the nipple with associated nipple retraction. This mass was biopsied, and showed IDC with surrounding DCIS, grade 3, ER+ 90%, and VA+ 75%.  HER2 was equivocal in approximately 35% of tumor cells by FISH and was negative by IHC.    Metastatic Breast Cancer Treatment:  12/23/2021 - 1/7/2021  Radiation (3000 cGy) to the lumbar spine.  1/29/2021 - present  Ibrance, zoladex, and anastrozole.  5/17/2021 radiofrequency ablation, kyphoplasty to L4  8/20/2021  Bilateral salpingo-oophorectomies, Ibrance and anastrozole    Interval History:  Ms. Sanderson comes into clinic today for metastatic breast cancer follow-up.  She has not been seen in medical oncology since September. She has been following closely with Dr. Tyson since her mental health has been poor the past several months. She notes since starting olanzapine 5 mg at night, the auditory hallucinations have quieted and her mood is starting to improve. She has follow-up later today with Dr. Tyson again. Physically she is doing okay. She is still  having some back pain as well as R lateral hip pain. She has been applying voltaren gel which helps her hip pain. She is willing to start PT but needs to call them. No new or different pain or lumps/bumps.     She has been taking ibrance and anastrozole. This past cycle she was 3 days late starting ibrance otherwise has been on track. She denies any nausea, anorexia, or significant bowel issues. She remains on miralax daily with the narcotic medications.     No fevers/chills or infectious concerns. She is cutting back on smoking and is down to 3-5 cigarettes per day. She has not had COVID vaccination yet and is planning to have first dose today.       Past Medical/Surgical History:   Past Medical History:   Diagnosis Date     Anxiety      Breast CA (H) 12/2020     Depression      DVT (deep venous thrombosis) (H) 2014     Left breast mass     x approximately 4-5 months     Pulmonary emboli (H)      Pyelonephritis      Schizoaffective disorder (H)      Tobacco use      Past Surgical History:   Procedure Laterality Date     IR LUMBAR KYPHOPLASTY VERTEBRAE  5/17/2021     LAPAROSCOPIC SALPINGO-OOPHORECTOMY Bilateral 8/20/2021    Procedure: BILATERAL SALPINGO-OOPHORECTOMY, LAPAROSCOPIC;  Surgeon: Rory Lopez MD;  Location: [facility] OR     TUBAL LIGATION  1998        Allergies   Allergen Reactions     Contrast Dye      Pt developed nausea after isovue 370 injection on 6/9/21        Current Outpatient Medications   Medication     anastrozole (ARIMIDEX) 1 MG tablet     diclofenac (VOLTAREN) 1 % topical gel     morphine (MS CONTIN) 15 MG CR tablet     nicotine (NICORETTE) 2 MG gum     OLANZapine (ZYPREXA) 2.5 MG tablet     ondansetron (ZOFRAN-ODT) 4 MG ODT tab     oxyCODONE (ROXICODONE) 5 MG tablet     palbociclib (IBRANCE) 125 MG tablet     pantoprazole (PROTONIX) 40 MG EC tablet     polyethylene glycol (MIRALAX) 17 GM/Dose powder     prochlorperazine (COMPAZINE) 10 MG tablet     rivaroxaban ANTICOAGULANT (XARELTO)  20 MG TABS tablet     SENNA-docusate sodium (SENNA S) 8.6-50 MG tablet     sertraline (ZOLOFT) 50 MG tablet     tolnaftate (TINACTIN) 1 % external cream     zolpidem (AMBIEN) 5 MG tablet     gabapentin (NEURONTIN) 300 MG capsule     naloxone (NARCAN) 4 MG/0.1ML nasal spray     No current facility-administered medications for this visit.   '      Physical Exam:   BP (!) 135/96   Pulse 78   Temp 97.7  F (36.5  C) (Oral)   Resp 16   Wt 112.1 kg (247 lb 3.2 oz)   SpO2 100%   BMI 33.52 kg/m     Wt Readings from Last 4 Encounters:   11/23/21 112.1 kg (247 lb 3.2 oz)   10/12/21 108.1 kg (238 lb 6.4 oz)   09/13/21 109.8 kg (242 lb)   08/16/21 112.5 kg (248 lb)     General:  Well appearing adult female in NAD.  Alert and oriented.  HEENT:  Normocephalic.  Sclera anicteric.   Lymph:  No palpable cervical, supraclavicular, or axillary LAD.  Chest:  CTA bilaterally.  No wheezes or crackles.  CV:  RRR.  Nl S1 and S2.  No m/r/g.  Abd:  Soft/ND.   Ext:  No pitting edema of the bilateral lower extremities.    Musculo:  Full ROM of the bilateral upper extremities.  Neuro:  Cranial nerves grossly intact.  Psych:  Mood and affect appear normal.  Skin:  Significant peeling of the heels and toes of the bilateral feet. There is circular xerotic rash on hands as well.     Laboratory/Imaging Studies    11/23/2021 11:01   Sodium 143   Potassium 3.8   Chloride 111 (H)   Carbon Dioxide 24   Urea Nitrogen 14   Creatinine 0.68   GFR Estimate >90   Calcium 9.1   Anion Gap 8   Albumin 3.1 (L)   Protein Total 7.2   Bilirubin Total 0.2   Alkaline Phosphatase 110   ALT 22   AST 10   Glucose 96   WBC 2.9 (L)   Hemoglobin 10.5 (L)   Hematocrit 32.6 (L)   Platelet Count 228   RBC Count 3.43 (L)   MCV 95   MCH 30.6   MCHC 32.2   RDW 15.9 (H)   % Neutrophils 62   % Lymphocytes 30   % Monocytes 4   % Eosinophils 2   % Basophils 2   Absolute Basophils 0.1   Absolute Neutrophil 1.8   Absolute Lymphocytes 0.9   Absolute Monocytes 0.1   Absolute  Eosinophils 0.1   RBC Morphology Confirmed RBC Indices   Platelet Morphology Automated Count Confirmed. Platelet morphology is normal.       ASSESSMENT/PLAN:   46 year old female with history of DVT with left breast invasive ductal carcinoma, ER/[place] positive, HER2 negative metastasized to bones.    1.  Metastatic breast cancer:  Since last visit, she underwent BSO and therefore no longer requires zoladex injections.  She continues on Ibrance and anastrozole. She is tolerating well.  Her tumor markers remain stable.  Will continue to monitor monthly.  Plan to repeat PET/CT on 12/3.     2.  Mood disorder, previously diagnosed with schizoaffective disorder, bipolar type: She is following closely with Dr. Tyson as she recently had a manic episode. She started on zyprexa and notes the auditory hallucinations are resolving and her mood is picking up.     3.  Bone metastases/back pain:  She is s/p XRT to the lumbar spine and kyphoplasty of L4.  She is taking MS contin 15 mg PO BID and oxycodone prn. This is being managed by palliative. Encouraged her to get set up with PT. Voltaren gel is helping her hip pain.     She has been on Zometa since 1/18/2021 and is receiving once every 3 months. This is currently on hold due to dental work. She has follow-up next week with them.     4.  DVT:  Recommend continuing Xarelto until contraindicated given metastatic disease.      5.  Tinea pedis:  Prescription for tolnaftate cream had been given--she has started this.       Alee Yang PA-C

## 2021-11-22 NOTE — PROGRESS NOTES
Patient no showed for today's visit.  She has a visit with Alee Yang on 11/23/2021.    This encounter was opened in error. Please disregard.

## 2021-11-23 ENCOUNTER — ONCOLOGY VISIT (OUTPATIENT)
Dept: ONCOLOGY | Facility: CLINIC | Age: 46
End: 2021-11-23
Attending: PHYSICIAN ASSISTANT
Payer: COMMERCIAL

## 2021-11-23 ENCOUNTER — APPOINTMENT (OUTPATIENT)
Dept: LAB | Facility: CLINIC | Age: 46
End: 2021-11-23
Attending: PHYSICIAN ASSISTANT
Payer: COMMERCIAL

## 2021-11-23 VITALS
WEIGHT: 247.2 LBS | DIASTOLIC BLOOD PRESSURE: 96 MMHG | HEART RATE: 78 BPM | RESPIRATION RATE: 16 BRPM | TEMPERATURE: 97.7 F | SYSTOLIC BLOOD PRESSURE: 135 MMHG | OXYGEN SATURATION: 100 % | BODY MASS INDEX: 33.52 KG/M2

## 2021-11-23 DIAGNOSIS — C50.912 CARCINOMA OF LEFT BREAST METASTATIC TO BONE (H): Primary | ICD-10-CM

## 2021-11-23 DIAGNOSIS — C79.51 CARCINOMA OF LEFT BREAST METASTATIC TO BONE (H): Primary | ICD-10-CM

## 2021-11-23 DIAGNOSIS — Z17.0 MALIGNANT NEOPLASM OF OVERLAPPING SITES OF LEFT BREAST IN FEMALE, ESTROGEN RECEPTOR POSITIVE (H): ICD-10-CM

## 2021-11-23 DIAGNOSIS — C50.812 MALIGNANT NEOPLASM OF OVERLAPPING SITES OF LEFT BREAST IN FEMALE, ESTROGEN RECEPTOR POSITIVE (H): ICD-10-CM

## 2021-11-23 LAB
ALBUMIN SERPL-MCNC: 3.1 G/DL (ref 3.4–5)
ALP SERPL-CCNC: 110 U/L (ref 40–150)
ALT SERPL W P-5'-P-CCNC: 22 U/L (ref 0–50)
ANION GAP SERPL CALCULATED.3IONS-SCNC: 8 MMOL/L (ref 3–14)
AST SERPL W P-5'-P-CCNC: 10 U/L (ref 0–45)
BASOPHILS # BLD MANUAL: 0.1 10E3/UL (ref 0–0.2)
BASOPHILS NFR BLD MANUAL: 2 %
BILIRUB SERPL-MCNC: 0.2 MG/DL (ref 0.2–1.3)
BUN SERPL-MCNC: 14 MG/DL (ref 7–30)
CALCIUM SERPL-MCNC: 9.1 MG/DL (ref 8.5–10.1)
CANCER AG27-29 SERPL-ACNC: 48 U/ML (ref 0–39)
CEA SERPL-MCNC: 3.1 UG/L (ref 0–2.5)
CHLORIDE BLD-SCNC: 111 MMOL/L (ref 94–109)
CO2 SERPL-SCNC: 24 MMOL/L (ref 20–32)
CREAT SERPL-MCNC: 0.68 MG/DL (ref 0.52–1.04)
EOSINOPHIL # BLD MANUAL: 0.1 10E3/UL (ref 0–0.7)
EOSINOPHIL NFR BLD MANUAL: 2 %
ERYTHROCYTE [DISTWIDTH] IN BLOOD BY AUTOMATED COUNT: 15.9 % (ref 10–15)
GFR SERPL CREATININE-BSD FRML MDRD: >90 ML/MIN/1.73M2
GLUCOSE BLD-MCNC: 96 MG/DL (ref 70–99)
HCT VFR BLD AUTO: 32.6 % (ref 35–47)
HGB BLD-MCNC: 10.5 G/DL (ref 11.7–15.7)
LYMPHOCYTES # BLD MANUAL: 0.9 10E3/UL (ref 0.8–5.3)
LYMPHOCYTES NFR BLD MANUAL: 30 %
MCH RBC QN AUTO: 30.6 PG (ref 26.5–33)
MCHC RBC AUTO-ENTMCNC: 32.2 G/DL (ref 31.5–36.5)
MCV RBC AUTO: 95 FL (ref 78–100)
MONOCYTES # BLD MANUAL: 0.1 10E3/UL (ref 0–1.3)
MONOCYTES NFR BLD MANUAL: 4 %
NEUTROPHILS # BLD MANUAL: 1.8 10E3/UL (ref 1.6–8.3)
NEUTROPHILS NFR BLD MANUAL: 62 %
PLAT MORPH BLD: NORMAL
PLATELET # BLD AUTO: 228 10E3/UL (ref 150–450)
POTASSIUM BLD-SCNC: 3.8 MMOL/L (ref 3.4–5.3)
PROT SERPL-MCNC: 7.2 G/DL (ref 6.8–8.8)
RBC # BLD AUTO: 3.43 10E6/UL (ref 3.8–5.2)
RBC MORPH BLD: NORMAL
SODIUM SERPL-SCNC: 143 MMOL/L (ref 133–144)
WBC # BLD AUTO: 2.9 10E3/UL (ref 4–11)

## 2021-11-23 PROCEDURE — 36415 COLL VENOUS BLD VENIPUNCTURE: CPT | Performed by: PHYSICIAN ASSISTANT

## 2021-11-23 PROCEDURE — 80053 COMPREHEN METABOLIC PANEL: CPT | Performed by: PHYSICIAN ASSISTANT

## 2021-11-23 PROCEDURE — 99214 OFFICE O/P EST MOD 30 MIN: CPT | Performed by: PHYSICIAN ASSISTANT

## 2021-11-23 PROCEDURE — 85027 COMPLETE CBC AUTOMATED: CPT | Performed by: PHYSICIAN ASSISTANT

## 2021-11-23 PROCEDURE — 86300 IMMUNOASSAY TUMOR CA 15-3: CPT | Performed by: PHYSICIAN ASSISTANT

## 2021-11-23 PROCEDURE — 82378 CARCINOEMBRYONIC ANTIGEN: CPT | Performed by: PHYSICIAN ASSISTANT

## 2021-11-23 PROCEDURE — G0463 HOSPITAL OUTPT CLINIC VISIT: HCPCS

## 2021-11-23 RX ORDER — ANASTROZOLE 1 MG/1
1 TABLET ORAL DAILY
Qty: 90 TABLET | Refills: 2 | Status: SHIPPED | OUTPATIENT
Start: 2021-11-23 | End: 2021-12-23

## 2021-11-23 ASSESSMENT — PAIN SCALES - GENERAL: PAINLEVEL: MODERATE PAIN (5)

## 2021-11-23 NOTE — LETTER
11/23/2021         RE: Teresa Sanderson  [address] [facility] [address]      Oncology Visit:   Date on this visit: Nov 23, 2021    Diagnosis:  ER positive left breast cancer metastasized to bones.     Primary Physician: No Ref-Primary, Physician     History Of Present Illness:    Ms. Sanderson is a 46 year old female with a h/o tobacco abuse and DVTs with left breast cancer metastasized to bone. She presented to [place] ED with back pain on 12/5/2020. MRI of the L-spine showed an abnormal L4 lesion with associated right paraspinal mass, abnormalities in L5 and the left iliac bone were also seen. CT C/A/P showed a left breast mass, lytic lesions of T7, L4, and the pelvis, and a 3 cm lesion in the kidney (thought to be a cyst). Ultrasound of the bilateral lower extremities showed a non-occlusive thrombus in the left popliteal vein. Mammogram and ultrasound of the bilateral breasts on 12/17/2020 showed a spiculated mass measuring at least 7.8 cm at 12-1:00 left breast extending from the nipple to 9 cm from the nipple with associated nipple retraction. This mass was biopsied, and showed IDC with surrounding DCIS, grade 3, ER+ 90%, and IN+ 75%.  HER2 was equivocal in approximately 35% of tumor cells by FISH and was negative by IHC.    Metastatic Breast Cancer Treatment:  12/23/2021 - 1/7/2021  Radiation (3000 cGy) to the lumbar spine.  1/29/2021 - present  Ibrance, zoladex, and anastrozole.  5/17/2021 radiofrequency ablation, kyphoplasty to L4  8/20/2021  Bilateral salpingo-oophorectomies, Ibrance and anastrozole    Interval History:  Ms. Sanderson comes into clinic today for metastatic breast cancer follow-up.  She has not been seen in medical oncology since September. She has been following closely with Dr. Tyson since her mental health has been poor the past several months. She notes since starting olanzapine 5 mg at night, the auditory hallucinations have quieted and her mood is starting to improve. She  has follow-up later today with Dr. Tyson again. Physically she is doing okay. She is still having some back pain as well as R lateral hip pain. She has been applying voltaren gel which helps her hip pain. She is willing to start PT but needs to call them. No new or different pain or lumps/bumps.     She has been taking ibrance and anastrozole. This past cycle she was 3 days late starting ibrance otherwise has been on track. She denies any nausea, anorexia, or significant bowel issues. She remains on miralax daily with the narcotic medications.     No fevers/chills or infectious concerns. She is cutting back on smoking and is down to 3-5 cigarettes per day. She has not had COVID vaccination yet and is planning to have first dose today.       Past Medical/Surgical History:   Past Medical History:   Diagnosis Date     Anxiety      Breast CA (H) 12/2020     Depression      DVT (deep venous thrombosis) (H) 2014     Left breast mass     x approximately 4-5 months     Pulmonary emboli (H)      Pyelonephritis      Schizoaffective disorder (H)      Tobacco use      Past Surgical History:   Procedure Laterality Date     IR LUMBAR KYPHOPLASTY VERTEBRAE  5/17/2021     LAPAROSCOPIC SALPINGO-OOPHORECTOMY Bilateral 8/20/2021    Procedure: BILATERAL SALPINGO-OOPHORECTOMY, LAPAROSCOPIC;  Surgeon: Rory Lopez MD;  Location: [facility] OR     TUBAL LIGATION  1998        Allergies   Allergen Reactions     Contrast Dye      Pt developed nausea after isovue 370 injection on 6/9/21        Current Outpatient Medications   Medication     anastrozole (ARIMIDEX) 1 MG tablet     diclofenac (VOLTAREN) 1 % topical gel     morphine (MS CONTIN) 15 MG CR tablet     nicotine (NICORETTE) 2 MG gum     OLANZapine (ZYPREXA) 2.5 MG tablet     ondansetron (ZOFRAN-ODT) 4 MG ODT tab     oxyCODONE (ROXICODONE) 5 MG tablet     palbociclib (IBRANCE) 125 MG tablet     pantoprazole (PROTONIX) 40 MG EC tablet     polyethylene glycol (MIRALAX) 17 GM/Dose  powder     prochlorperazine (COMPAZINE) 10 MG tablet     rivaroxaban ANTICOAGULANT (XARELTO) 20 MG TABS tablet     SENNA-docusate sodium (SENNA S) 8.6-50 MG tablet     sertraline (ZOLOFT) 50 MG tablet     tolnaftate (TINACTIN) 1 % external cream     zolpidem (AMBIEN) 5 MG tablet     gabapentin (NEURONTIN) 300 MG capsule     naloxone (NARCAN) 4 MG/0.1ML nasal spray     No current facility-administered medications for this visit.   '      Physical Exam:   BP (!) 135/96   Pulse 78   Temp 97.7  F (36.5  C) (Oral)   Resp 16   Wt 112.1 kg (247 lb 3.2 oz)   SpO2 100%   BMI 33.52 kg/m     Wt Readings from Last 4 Encounters:   11/23/21 112.1 kg (247 lb 3.2 oz)   10/12/21 108.1 kg (238 lb 6.4 oz)   09/13/21 109.8 kg (242 lb)   08/16/21 112.5 kg (248 lb)     General:  Well appearing adult female in NAD.  Alert and oriented.  HEENT:  Normocephalic.  Sclera anicteric.   Lymph:  No palpable cervical, supraclavicular, or axillary LAD.  Chest:  CTA bilaterally.  No wheezes or crackles.  CV:  RRR.  Nl S1 and S2.  No m/r/g.  Abd:  Soft/ND.   Ext:  No pitting edema of the bilateral lower extremities.    Musculo:  Full ROM of the bilateral upper extremities.  Neuro:  Cranial nerves grossly intact.  Psych:  Mood and affect appear normal.  Skin:  Significant peeling of the heels and toes of the bilateral feet. There is circular xerotic rash on hands as well.     Laboratory/Imaging Studies    11/23/2021 11:01   Sodium 143   Potassium 3.8   Chloride 111 (H)   Carbon Dioxide 24   Urea Nitrogen 14   Creatinine 0.68   GFR Estimate >90   Calcium 9.1   Anion Gap 8   Albumin 3.1 (L)   Protein Total 7.2   Bilirubin Total 0.2   Alkaline Phosphatase 110   ALT 22   AST 10   Glucose 96   WBC 2.9 (L)   Hemoglobin 10.5 (L)   Hematocrit 32.6 (L)   Platelet Count 228   RBC Count 3.43 (L)   MCV 95   MCH 30.6   MCHC 32.2   RDW 15.9 (H)   % Neutrophils 62   % Lymphocytes 30   % Monocytes 4   % Eosinophils 2   % Basophils 2   Absolute Basophils 0.1    Absolute Neutrophil 1.8   Absolute Lymphocytes 0.9   Absolute Monocytes 0.1   Absolute Eosinophils 0.1   RBC Morphology Confirmed RBC Indices   Platelet Morphology Automated Count Confirmed. Platelet morphology is normal.       ASSESSMENT/PLAN:   46 year old female with history of DVT with left breast invasive ductal carcinoma, ER/[place] positive, HER2 negative metastasized to bones.    1.  Metastatic breast cancer:  Since last visit, she underwent BSO and therefore no longer requires zoladex injections.  She continues on Ibrance and anastrozole. She is tolerating well.  Her tumor markers remain stable.  Will continue to monitor monthly.  Plan to repeat PET/CT on 12/3.     2.  Mood disorder, previously diagnosed with schizoaffective disorder, bipolar type: She is following closely with Dr. Tyson as she recently had a manic episode. She started on zyprexa and notes the auditory hallucinations are resolving and her mood is picking up.     3.  Bone metastases/back pain:  She is s/p XRT to the lumbar spine and kyphoplasty of L4.  She is taking MS contin 15 mg PO BID and oxycodone prn. This is being managed by palliative. Encouraged her to get set up with PT. Voltaren gel is helping her hip pain.     She has been on Zometa since 1/18/2021 and is receiving once every 3 months. This is currently on hold due to dental work. She has follow-up next week with them.     4.  DVT:  Recommend continuing Xarelto until contraindicated given metastatic disease.      5.  Tinea pedis:  Prescription for tolnaftate cream had been given--she has started this.       RICHARD Kim PA-C

## 2021-11-23 NOTE — NURSING NOTE
Chief Complaint   Patient presents with     Blood Draw     Labs drawn via  by RN in lab.  VS taken       Labs collected from venipuncture by RN. Vitals taken. Checked in for appointment(s).    Yissel Quiñones RN

## 2021-11-23 NOTE — NURSING NOTE
"Oncology Rooming Note    November 23, 2021 11:11 AM   Teresa Sanderson is a 46 year old female who presents for:    Chief Complaint   Patient presents with     Blood Draw     Labs drawn via  by RN in lab.  VS taken     Oncology Clinic Visit     breast cancer      Initial Vitals: BP (!) 135/96   Pulse 78   Temp 97.7  F (36.5  C) (Oral)   Resp 16   Wt 112.1 kg (247 lb 3.2 oz)   SpO2 100%   BMI 33.52 kg/m   Estimated body mass index is 33.52 kg/m  as calculated from the following:    Height as of 9/13/21: 1.829 m (6' 0.01\").    Weight as of this encounter: 112.1 kg (247 lb 3.2 oz). Body surface area is 2.39 meters squared.  Moderate Pain (5) Comment: Data Unavailable   No LMP recorded. Patient has had an injection.  Allergies reviewed: Yes  Medications reviewed: Yes    Medications: MEDICATION REFILLS NEEDED TODAY. Provider was notified.  Pharmacy name entered into [employer] [facility]:    [facility] - [place], [place] - [phone]  [facility] - [place], [place] - [address] [facility] [place], [place] - [address] [id number] [place], [place] - [address] [facility] [address]   [facility] - [place], [place] - [address] [facility] [address]  [id number] - [place], [place] - [address] AT [facility] [address] [facility] [address]  [facility] [place], [place] - [address]  [facility] - [place], [place] - [address]   [facility] - [place], [place] - [address]    Clinical concerns: patient would needs a refill of Arimidex. She reports that she is extremely fatigue that started yesterday .         Breanna Loyd, ESME              "

## 2021-11-24 ENCOUNTER — TELEPHONE (OUTPATIENT)
Dept: PSYCHIATRY | Facility: CLINIC | Age: 46
End: 2021-11-24
Payer: COMMERCIAL

## 2021-11-24 NOTE — TELEPHONE ENCOUNTER
Pt missed appointment with Dr. Tyson yesterday, 11/23/21. Writer attempted to reach pt via phone for brief check-in and to confirm rescheduled appointment. No answer; unable to LVM as inbox is full. Will make second attempt later this week.

## 2021-11-26 ENCOUNTER — TELEPHONE (OUTPATIENT)
Dept: PSYCHIATRY | Facility: CLINIC | Age: 46
End: 2021-11-26

## 2021-11-26 ENCOUNTER — TELEPHONE (OUTPATIENT)
Dept: BEHAVIORAL HEALTH | Facility: CLINIC | Age: 46
End: 2021-11-26
Payer: COMMERCIAL

## 2021-11-26 ENCOUNTER — HOSPITAL ENCOUNTER (INPATIENT)
Facility: CLINIC | Age: 46
LOS: 4 days | Discharge: HOME | End: 2021-11-30
Attending: FAMILY MEDICINE | Admitting: PSYCHIATRY & NEUROLOGY
Payer: COMMERCIAL

## 2021-11-26 DIAGNOSIS — G47.00 INSOMNIA, UNSPECIFIED TYPE: ICD-10-CM

## 2021-11-26 DIAGNOSIS — Z11.52 ENCOUNTER FOR SCREENING LABORATORY TESTING FOR SEVERE ACUTE RESPIRATORY SYNDROME CORONAVIRUS 2 (SARS-COV-2): ICD-10-CM

## 2021-11-26 DIAGNOSIS — F25.0 SCHIZOAFFECTIVE DISORDER, BIPOLAR TYPE (H): ICD-10-CM

## 2021-11-26 DIAGNOSIS — F29 PSYCHOSIS, UNSPECIFIED PSYCHOSIS TYPE (H): ICD-10-CM

## 2021-11-26 LAB — SARS-COV-2 RNA RESP QL NAA+PROBE: NEGATIVE

## 2021-11-26 PROCEDURE — 250N000013 HC RX MED GY IP 250 OP 250 PS 637: Performed by: EMERGENCY MEDICINE

## 2021-11-26 PROCEDURE — 250N000013 HC RX MED GY IP 250 OP 250 PS 637: Performed by: NURSE PRACTITIONER

## 2021-11-26 PROCEDURE — 90791 PSYCH DIAGNOSTIC EVALUATION: CPT

## 2021-11-26 PROCEDURE — 124N000002 HC R&B MH UMMC

## 2021-11-26 PROCEDURE — 99285 EMERGENCY DEPT VISIT HI MDM: CPT | Mod: 25

## 2021-11-26 PROCEDURE — 99285 EMERGENCY DEPT VISIT HI MDM: CPT | Performed by: FAMILY MEDICINE

## 2021-11-26 PROCEDURE — C9803 HOPD COVID-19 SPEC COLLECT: HCPCS

## 2021-11-26 PROCEDURE — 90853 GROUP PSYCHOTHERAPY: CPT

## 2021-11-26 PROCEDURE — U0005 INFEC AGEN DETEC AMPLI PROBE: HCPCS | Performed by: FAMILY MEDICINE

## 2021-11-26 RX ORDER — VITAMIN B COMPLEX
25 TABLET ORAL DAILY
Status: DISCONTINUED | OUTPATIENT
Start: 2021-11-26 | End: 2021-11-30 | Stop reason: HOSPADM

## 2021-11-26 RX ORDER — PANTOPRAZOLE SODIUM 40 MG/1
40 TABLET, DELAYED RELEASE ORAL
Status: DISCONTINUED | OUTPATIENT
Start: 2021-11-27 | End: 2021-11-30 | Stop reason: HOSPADM

## 2021-11-26 RX ORDER — VITAMIN B COMPLEX
25 TABLET ORAL DAILY
COMMUNITY
End: 2022-05-13

## 2021-11-26 RX ORDER — OXYCODONE HYDROCHLORIDE 5 MG/1
5-10 TABLET ORAL EVERY 6 HOURS PRN
Status: DISCONTINUED | OUTPATIENT
Start: 2021-11-26 | End: 2021-11-30 | Stop reason: HOSPADM

## 2021-11-26 RX ORDER — IBUPROFEN 800 MG/1
800 TABLET, FILM COATED ORAL EVERY 6 HOURS PRN
COMMUNITY
End: 2022-10-04

## 2021-11-26 RX ORDER — GABAPENTIN 300 MG/1
600 CAPSULE ORAL 3 TIMES DAILY
Status: DISCONTINUED | OUTPATIENT
Start: 2021-11-26 | End: 2021-11-30 | Stop reason: HOSPADM

## 2021-11-26 RX ORDER — HYDROXYZINE HYDROCHLORIDE 25 MG/1
25 TABLET, FILM COATED ORAL EVERY 4 HOURS PRN
Status: DISCONTINUED | OUTPATIENT
Start: 2021-11-26 | End: 2021-11-30 | Stop reason: HOSPADM

## 2021-11-26 RX ORDER — IBUPROFEN 400 MG/1
800 TABLET, FILM COATED ORAL EVERY 6 HOURS PRN
Status: DISCONTINUED | OUTPATIENT
Start: 2021-11-26 | End: 2021-11-30 | Stop reason: HOSPADM

## 2021-11-26 RX ORDER — AMOXICILLIN 250 MG
2 CAPSULE ORAL 2 TIMES DAILY PRN
Status: DISCONTINUED | OUTPATIENT
Start: 2021-11-26 | End: 2021-11-30 | Stop reason: HOSPADM

## 2021-11-26 RX ORDER — MAGNESIUM HYDROXIDE/ALUMINUM HYDROXICE/SIMETHICONE 120; 1200; 1200 MG/30ML; MG/30ML; MG/30ML
30 SUSPENSION ORAL EVERY 4 HOURS PRN
Status: DISCONTINUED | OUTPATIENT
Start: 2021-11-26 | End: 2021-11-30 | Stop reason: HOSPADM

## 2021-11-26 RX ORDER — ANASTROZOLE 1 MG/1
1 TABLET ORAL DAILY
Status: DISCONTINUED | OUTPATIENT
Start: 2021-11-26 | End: 2021-11-30 | Stop reason: HOSPADM

## 2021-11-26 RX ORDER — METHOCARBAMOL 500 MG/1
500 TABLET, FILM COATED ORAL 4 TIMES DAILY PRN
Status: DISCONTINUED | OUTPATIENT
Start: 2021-11-26 | End: 2021-11-30 | Stop reason: HOSPADM

## 2021-11-26 RX ORDER — THERMOMETER, ELECTRONIC,ORAL
EACH MISCELLANEOUS 2 TIMES DAILY
Status: DISCONTINUED | OUTPATIENT
Start: 2021-11-26 | End: 2021-11-30 | Stop reason: HOSPADM

## 2021-11-26 RX ORDER — ACETAMINOPHEN 325 MG/1
650 TABLET ORAL EVERY 4 HOURS PRN
Status: DISCONTINUED | OUTPATIENT
Start: 2021-11-26 | End: 2021-11-30 | Stop reason: HOSPADM

## 2021-11-26 RX ORDER — METHOCARBAMOL 500 MG/1
500 TABLET, FILM COATED ORAL 4 TIMES DAILY PRN
COMMUNITY
End: 2022-07-14

## 2021-11-26 RX ORDER — OLANZAPINE 10 MG/1
10 TABLET ORAL 3 TIMES DAILY PRN
Status: DISCONTINUED | OUTPATIENT
Start: 2021-11-26 | End: 2021-11-30 | Stop reason: HOSPADM

## 2021-11-26 RX ORDER — OLANZAPINE 10 MG/2ML
10 INJECTION, POWDER, FOR SOLUTION INTRAMUSCULAR 3 TIMES DAILY PRN
Status: DISCONTINUED | OUTPATIENT
Start: 2021-11-26 | End: 2021-11-30 | Stop reason: HOSPADM

## 2021-11-26 RX ORDER — TRAZODONE HYDROCHLORIDE 50 MG/1
50 TABLET, FILM COATED ORAL
Status: DISCONTINUED | OUTPATIENT
Start: 2021-11-26 | End: 2021-11-30 | Stop reason: HOSPADM

## 2021-11-26 RX ORDER — POLYETHYLENE GLYCOL 3350 17 G/17G
17 POWDER, FOR SOLUTION ORAL DAILY PRN
Status: DISCONTINUED | OUTPATIENT
Start: 2021-11-26 | End: 2021-11-30 | Stop reason: HOSPADM

## 2021-11-26 RX ORDER — MORPHINE SULFATE 15 MG/1
15 TABLET, FILM COATED, EXTENDED RELEASE ORAL EVERY 12 HOURS
Status: DISCONTINUED | OUTPATIENT
Start: 2021-11-26 | End: 2021-11-30 | Stop reason: HOSPADM

## 2021-11-26 RX ORDER — OLANZAPINE 2.5 MG/1
5 TABLET, FILM COATED ORAL AT BEDTIME
Status: DISCONTINUED | OUTPATIENT
Start: 2021-11-26 | End: 2021-11-27

## 2021-11-26 RX ORDER — PROCHLORPERAZINE MALEATE 10 MG
10 TABLET ORAL EVERY 6 HOURS PRN
Status: DISCONTINUED | OUTPATIENT
Start: 2021-11-26 | End: 2021-11-30 | Stop reason: HOSPADM

## 2021-11-26 RX ADMIN — MORPHINE SULFATE 15 MG: 15 TABLET, FILM COATED, EXTENDED RELEASE ORAL at 18:36

## 2021-11-26 RX ADMIN — DICLOFENAC SODIUM 2 G: 10 GEL TOPICAL at 22:33

## 2021-11-26 RX ADMIN — IBUPROFEN 800 MG: 400 TABLET ORAL at 22:36

## 2021-11-26 RX ADMIN — RIVAROXABAN 20 MG: 10 TABLET, FILM COATED ORAL at 22:33

## 2021-11-26 RX ADMIN — ANASTROZOLE 1 MG: 1 TABLET ORAL at 22:32

## 2021-11-26 RX ADMIN — Medication 25 MCG: at 22:36

## 2021-11-26 RX ADMIN — GABAPENTIN 600 MG: 300 CAPSULE ORAL at 22:32

## 2021-11-26 RX ADMIN — OLANZAPINE 5 MG: 2.5 TABLET, FILM COATED ORAL at 22:32

## 2021-11-26 ASSESSMENT — MIFFLIN-ST. JEOR: SCORE: 1846.53

## 2021-11-26 ASSESSMENT — ENCOUNTER SYMPTOMS
SLEEP DISTURBANCE: 1
HALLUCINATIONS: 1
DYSPHORIC MOOD: 1

## 2021-11-26 ASSESSMENT — ACTIVITIES OF DAILY LIVING (ADL)
DRESS: INDEPENDENT
HYGIENE/GROOMING: INDEPENDENT
ORAL_HYGIENE: INDEPENDENT

## 2021-11-26 NOTE — TELEPHONE ENCOUNTER
--Spoke with Dr. Tyson and provided update.  --Spoke with Carmenza Cardona in the [facility]. Provided updated and let her know that pt will likely need an admission for more drastic medication changes.   --Spoke with pt's daughter for update. Pt is planning to come into the ED in about an hour. Pt declined to be transported via ambulance. Writer asked that daughter call 911 in the event that pt's safety is at imminent risk. Daughter expressed understanding.

## 2021-11-26 NOTE — TELEPHONE ENCOUNTER
"Made second attempt to reach pt for check-in. Spoke with pt who reports the following:  --\"I can't go on like this.\"   --Mood has been very negative and dark the past 3-4 days with significant feelings of hopelessness. Pt tearful during conversation.   --She initially denies AH, but then reports she might be experiencing them. She reports she has not been thinking clearly.   --She denies acute plan to take her life, but things got really bad last night where she was considering coming into the ED. Things are the same today and she feels like she needs to be seen and potentially admitted for additional support.   --Pt started Zyprexa 4 days ago. Is taking 5mg at bedtime which has helped her sleep. Initially was taking 2.5mg BID, but was having difficulty sleeping at night.   --Pt at home with daughter, who is her PCA. Pt reports daughter is aware of the current situation.  --Pt will plan to come into the [facility] ED and will have one of her family member's transport.  --Writer obtained consent to speak with daughterPeyman.     Spoke with daughter who is aware of situation and with her mom. She reports mom has been pretty down over the last 3-4 days. Denies imminent safety concerns that would require EMS. No other input offered.     Writer to follow-up with pt in 30 minutes for an update.  "

## 2021-11-26 NOTE — SAFE
Teresa Sanderson  November 26, 2021  SAFE Note        Critical Safety Issues:       Current Suicidal Ideation/Self-Injurious Concerns/Methods: None - N/A, Pt reporting depressed mood and hopelessness stating she is having thoughts of 'take me right now'.  Pt denies plan or intent, denies HI and SIB.       Current or Historical Inappropriate Sexual Behavior: No      Current or Historical Aggression/Homicidal Ideation: None - N/A      Triggers: depression    Updated care team: yes, doctor, nurses and intake aware.     For additional details see full [facility] assessment.       Rachelle Malloy

## 2021-11-26 NOTE — PHARMACY-ADMISSION MEDICATION HISTORY
Admission Medication History Completed by Pharmacy    See [facility] Admission Navigator for allergy information, preferred outpatient pharmacy, prior to admission medications and immunization status.     Medication history sources:  patient interview, [employer]    Pertinent changes made to PTA medication list:  Added:   - Robaxin, vitamin D  Deleted: N/A  Changed: N/A    Additional medication history information:   - Pt takes all once daily meds at bedtime.  - Pt reports today is Day 12 of 21 on her 3 weeks on of taking Ibrance.  - Patient denies taking any additional Rx/OTC medications other than the ones listed below.    Prior to Admission medications    Medication Sig Last Dose Taking? Auth Provider   anastrozole (ARIMIDEX) 1 MG tablet Take 1 tablet (1 mg) by mouth daily 11/25/2021 Yes Alee Yang PA-C   diclofenac (VOLTAREN) 1 % topical gel Apply 2 g topically 4 times daily 11/25/2021 Yes Ayanna Tellez DO   gabapentin (NEURONTIN) 300 MG capsule Take 2 capsules (600 mg) by mouth every morning AND 2 capsules (600 mg) daily at 2 pm AND 2 capsules (600 mg) At Bedtime. 11/25/2021 at HS Yes Ayanna Tellez DO   methocarbamol (ROBAXIN) 500 MG tablet Take 500 mg by mouth 4 times daily as needed for muscle spasms PRN Yes Unknown, Entered By History   morphine (MS CONTIN) 15 MG CR tablet Take 1 tablet (15 mg) by mouth every 12 hours 11/25/2021 at HS Yes Ayanna Tellez DO   naloxone (NARCAN) 4 MG/0.1ML nasal spray Spray 1 spray (4 mg) into one nostril alternating nostrils once as needed for opioid reversal every 2-3 minutes until assistance arrives PRN Yes Alee Yang PA-C   nicotine (NICORETTE) 2 MG gum Place 1 each (2 mg) inside cheek every hour as needed for smoking cessation Past Week Yes Alee Yang PA-C   OLANZapine (ZYPREXA) 2.5 MG tablet take one tab (2.5mg) in the morning and one tab (2.5mg) at night  Patient taking differently: Take 5 mg by mouth At  Bedtime  11/25/2021 Yes Karlee Tyson MD   ondansetron (ZOFRAN-ODT) 4 MG ODT tab Take 1 tablet (4 mg) by mouth every 6 hours as needed for nausea or vomiting PRN Yes Clyde Garcia MD   oxyCODONE (ROXICODONE) 5 MG tablet Take 1-2 tablets (5-10 mg) by mouth every 6 hours as needed for moderate to severe pain 11/26/2021 Yes Ayanna Tellez DO   palbociclib (IBRANCE) 125 MG tablet Take 1 tablet (125 mg) by mouth daily Take for 21 days, then 7 days off. 11/25/2021 Yes Jahaira Plunkett MD   pantoprazole (PROTONIX) 40 MG EC tablet Take 1 tablet (40 mg) by mouth every morning (before breakfast) 11/25/2021 Yes Jahaira Plunkett MD   polyethylene glycol (MIRALAX) 17 GM/Dose powder Take 17 g by mouth daily as needed for constipation PRN Yes Ayanna Tellez DO   prochlorperazine (COMPAZINE) 10 MG tablet Take 1 tablet (10 mg) by mouth every 6 hours as needed (Nausea/Vomiting) PRN Yes Jahaira Plunkett MD   rivaroxaban ANTICOAGULANT (XARELTO) 20 MG TABS tablet Take 1 tablet (20 mg) by mouth daily (with dinner) 11/25/2021 Yes Alee Yang PA-C   SENNA-docusate sodium (SENNA S) 8.6-50 MG tablet Take 2 tablets by mouth 2 times daily as needed (Constipation) PRN Yes Ayanna Tellez DO   sertraline (ZOLOFT) 50 MG tablet Take 2 tablets (100 mg) by mouth daily 11/25/2021 Yes Abby Chi MD   tolnaftate (TINACTIN) 1 % external cream Apply topically 2 times daily 11/25/2021 Yes Ayanna Tellez DO   Vitamin D3 (CHOLECALCIFEROL) 25 mcg (1000 units) tablet Take 25 mcg by mouth daily 11/25/2021 Yes Unknown, Entered By History     Date completed: 11/26/21    Medication history completed by:   Jose Morfin, PharmD, BCPS  [facility]  Emergency Department: Ascom *[phone]

## 2021-11-26 NOTE — ED PROVIDER NOTES
"ED Provider Note  [facility]      History     Chief Complaint   Patient presents with     Suicidal     Patient reports having overwhelming thoughts. Patient tries to be positive but is unable to do it.      Insomnia     Patient has been taking medication for sleep. Patient just started sleeping medication 4 days ago.      Depression     Suicidal thoughts; no plan     The history is provided by the patient and medical records.     Teresa Sanderson is a 46 year old female who the past medical history of schizoaffective disorder presents with auditory hallucinations, depression, and unspecified psychosis.  She reports that she has been getting worse in the past couple years but in the last 3 months she has been a lot worse.  She has been very depressed and having a hard time taking care of herself and interacting with others.  She has also been experiencing mood swings as well as thoughts of \"not wanting to be here \".  She denies suicidal ideation but states that she is having auditory hallucinations that are very negative towards her.  She reports that she even hears these auditory hallucinations in her dreams and that they make it very hard for her to interact with people.  She also has thoughts of \"just take me right now\".  She denies any history of suicide attempts.  Patient is sober for 2 years.  She has a family history of schizoaffective disorder and bipolar disorder.  Reports that the last time she was hospitalized was in 0129-4381 for 10 months.  While in the hospital she was given Zyprexa 2.5 mg which was switched to 5 mg to help with sleep.    Past Medical History  Past Medical History:   Diagnosis Date     Anxiety      Breast CA (H) 12/2020     Depression      DVT (deep venous thrombosis) (H) 2014     Left breast mass     x approximately 4-5 months     Pulmonary emboli (H)      Pyelonephritis      Schizoaffective disorder (H)      Tobacco use      Past Surgical History: "   Procedure Laterality Date     IR LUMBAR KYPHOPLASTY VERTEBRAE  5/17/2021     LAPAROSCOPIC SALPINGO-OOPHORECTOMY Bilateral 8/20/2021    Procedure: BILATERAL SALPINGO-OOPHORECTOMY, LAPAROSCOPIC;  Surgeon: Rory Lopez MD;  Location: [facility] OR     TUBAL LIGATION  1998     anastrozole (ARIMIDEX) 1 MG tablet  diclofenac (VOLTAREN) 1 % topical gel  gabapentin (NEURONTIN) 300 MG capsule  methocarbamol (ROBAXIN) 500 MG tablet  morphine (MS CONTIN) 15 MG CR tablet  naloxone (NARCAN) 4 MG/0.1ML nasal spray  nicotine (NICORETTE) 2 MG gum  OLANZapine (ZYPREXA) 2.5 MG tablet  ondansetron (ZOFRAN-ODT) 4 MG ODT tab  oxyCODONE (ROXICODONE) 5 MG tablet  palbociclib (IBRANCE) 125 MG tablet  pantoprazole (PROTONIX) 40 MG EC tablet  polyethylene glycol (MIRALAX) 17 GM/Dose powder  prochlorperazine (COMPAZINE) 10 MG tablet  rivaroxaban ANTICOAGULANT (XARELTO) 20 MG TABS tablet  SENNA-docusate sodium (SENNA S) 8.6-50 MG tablet  sertraline (ZOLOFT) 50 MG tablet  tolnaftate (TINACTIN) 1 % external cream  Vitamin D3 (CHOLECALCIFEROL) 25 mcg (1000 units) tablet      Allergies   Allergen Reactions     Contrast Dye      Pt developed nausea after isovue 370 injection on 6/9/21      Family History  Family History   Problem Relation Age of Onset     Lung Cancer Mother      Lung Cancer Father      Lupus Brother      Kidney Disease Brother      Diabetes Daughter      Asthma Son      Cardiovascular Maternal Aunt      Hypertension Other      Diabetes Other      Deep Vein Thrombosis (DVT) No family hx of      Social History   Social History     Tobacco Use     Smoking status: Current Every Day Smoker     Packs/day: 0.25     Types: Cigarettes     Start date: 5/13/2011     Smokeless tobacco: Never Used   Substance Use Topics     Alcohol use: Not Currently     Comment: occ     Drug use: Yes     Types: Marijuana     Comment: occ      Past medical history, past surgical history, medications, allergies, family history, and social history were  reviewed with the patient. No additional pertinent items.       Review of Systems   Psychiatric/Behavioral: Positive for dysphoric mood, hallucinations and sleep disturbance. Negative for suicidal ideas.   All other systems reviewed and are negative.    A complete review of systems was performed with pertinent positives and negatives noted in the HPI, and all other systems negative.    Physical Exam   BP: 131/87  Pulse: 71  Temp: 98.1  F (36.7  C)  Resp: 18  Weight: 109.8 kg (242 lb)  SpO2: 98 %  Physical Exam  Constitutional:       General: She is not in acute distress.     Appearance: She is not diaphoretic.   HENT:      Head: Atraumatic.      Mouth/Throat:      Pharynx: No oropharyngeal exudate.   Eyes:      General: No scleral icterus.     Pupils: Pupils are equal, round, and reactive to light.   Cardiovascular:      Heart sounds: Normal heart sounds.   Pulmonary:      Effort: No respiratory distress.      Breath sounds: Normal breath sounds.   Abdominal:      General: Bowel sounds are normal.      Palpations: Abdomen is soft.      Tenderness: There is no abdominal tenderness.   Musculoskeletal:         General: No tenderness.   Skin:     General: Skin is warm.      Findings: No rash.   Neurological:      General: No focal deficit present.      Mental Status: She is oriented to person, place, and time.      Sensory: No sensory deficit.      Motor: No weakness.      Coordination: Coordination normal.   Psychiatric:         Attention and Perception: She perceives auditory hallucinations.         Mood and Affect: Mood is anxious. Affect is labile.         Speech: Speech is rapid and pressured.         Behavior: Behavior is agitated and hyperactive.         Thought Content: Thought content is paranoid.         ED Course      Procedures    The medical record was reviewed and interpreted.  Patient was seen and evaluated by the  please refer to their documentation the note section of the epic chart dated  11/26/2021       Medications - No data to display     Assessments & Plan (with Medical Decision Making)     I have reviewed the nursing notes. I have reviewed the findings, diagnosis, plan and need for follow up with the patient.    Patient with schizoaffective disorder insomnia and underlying psychosis at this time patient is starting to have escalation of symptoms and does not feel as though she can maintain safety at home she will be admitted to locked psychiatric unit for further stabilization and treatment for a voluntary admission.    Final diagnoses:   Schizoaffective disorder, bipolar type (H)   Insomnia, unspecified type   Psychosis, unspecified psychosis type (H)       --  Boris Kohli  [facility] EMERGENCY DEPARTMENT  11/26/2021     Boris Kohli MD  11/26/21 [id number]

## 2021-11-26 NOTE — ED NOTES
11/26/2021  Teresa Sanderson 1975     [facility] Crisis Assessment:    Started at: 3:25 PM  Completed at: 3:55 PM  Patient was assessed via in-person.  Patient Location: [facility] ED    Chief Complaint and History of Presenting Problem:    Patient is a 46 year old -American female who presented to the ED by Family/Friends related to concerns for increased depressed mood, disorganized thinking and auditory hallucinations.     Assessment and intervention involved meeting with pt, obtaining collateral from [facility] and Care Everywhere records and [facility] RN , employing crisis psychotherapy including: Establishing rapport, Active listening, Assess dimensions of crisis, Apply solution-focused therapy to address current crisis, Identify additional supports and alternative coping skills, Motivational Interviewing, Brief Supportive Therapy, Trauma-Informed Care and Safety planning.   Biopsychosocial Background and Demographic Information    PT is on disability due to having breast cancer.  She has adult children and her daughter is her PCA.  She reports that she held a steady job for 15 years and then she started to have paranoid thoughts that caused issues with her job and she had to leave.  Pt reports that this was a couple years ago.       Mental Health History and Current Symptoms     Pt reports that she is having ups and downs for the past 3 months and is currently feeling very down with thoughts of hopelessness, thoughts of 'take me right now', difficulty sleeping and taking care of herself, irritability and isolation, and audible hallucinations of loud negative comments about herself.  These voices are also present in her sleep and makes it difficult to stay asleep.  Pt reports that voices have been happening for a couple of years but have increased in past few months.  Pt also states she is having a hard time making decisions and is often confused on what year and day it is.     Pt has current dx of unspecified psychosis,  "unspecified mood dx and historical dx of schizoaffective DX bi-polar type and Bi-polar.  Pt is being seen by Dr. Jah MD at [facility]. Pt is not currently seeing a therapist.  Pt reports that her last hospitalization was in [id number] for 10 months for mental health.  She reports that she had similar symptoms back then as she is having now.  Pt denies SIB, HI at this time.  Pt reports passive SI with thoughts of \"take me now\", denies plan or intent.     Family Mental and Chemical Health History: Family HX of schizophrenia, manic depression, bi-polar.  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      Current and Historic Psychotropic Medications: Sertroline and Zyprexa  Medication Adherent: Yes  Recent medication changes? Yes, increased Zyprexa to 5 mg 2-3 days ago.     Relevant Medical Concerns  Patient identifies concerns with completing ADLs? when pt is 'low' she reports difficulty taking care of herself.   Patient can ambulate independently? Yes and No  Other medical health concerns? Breat Cancer  History of concussion or TBI? No     Trauma History   Physical, Emotional, or Sexual abuse: unknown  Loss of a friend or family member to suicide: unknown  Other identified traumatic event or significant stressor: Cancer tx this past year    Substance Use History and Treatments    Drug use in past but has been sober for 2 years.     Drug screen/BAL/Breathalyzer Completed? No  Results: NA     History of Suicidal Ideation, Suicide Attempts, Non-Suicidal Self Injury, and Risk Formulation:   Details of Current Ideation, Attempt(s), Plan(s): passive SI with thoughts of 'take me right now', denies plan, intent or attempt.  Risk factors: hopelessness, hx of addiction,   Protective factors: engaged and/or invested in treatment and states she wants to feel better.  History and Prior Methods of Self-injury: denied  History of Suicide Attempts: denied    ESS-6  1.a. Over the past 2 weeks, have you had " thoughts of killing yourself? No   1.b. Have you ever attempted to kill yourself and, if yes, when did this last happen? No  2. Recent or current suicide plan? No  3. Recent or current intent to act on ideation? No  4. Lifetime psychiatric hospitalization? Yes  5. Pattern of excessive substance use? Yes  6. Current irritability, agitation, or aggression? Yes  ESS-6 Score: 3      Other Risk Areas  Aggressive/assumptive/homicidal risk factors: No   Sexually inappropriate behavior? No      Vulnerability to sexual exploitation? No     Clinical Presentation and Current Symptoms     Attention, Hyperactivity, and Impulsivity: No   Anxiety:No    Behavioral Difficulties: No   Mood Symptoms: Yes: Crying or feels like crying, Feelings of helplessness , Feelings of hopelessness , Impaired decision making , Increased irritability/agitation, Loss of interest / Anhedonia , Sad, depressed mood  and Sleep disturbance    Appetite: No   Feeding and Eating: No  Interpersonal Functioning: No  Learning Disabilities/Cognitive/Developmental Disorders: No   General Cognitive Impairments: No  If yes, see completed Mini-Cog Assessment below.  Sleep: Yes: Difficulty falling asleep  and Difficulty staying sleep    Psychosis: Yes: Hallucinations: Auditory and Other: disorganized thinking (difficulty making decisions/remembering dates)    Trauma: No       Mental Status Exam:  Affect: Appropriate  Appearance: Appropriate   Attention Span/Concentration: Attentive    Eye Contact: Engaged  Fund of Knowledge: Appropriate   Language /Speech Content: Fluent  Language /Speech Volume: Normal   Language /Speech Rate/Productions: Normal   Recent Memory: Intact  Remote Memory: Intact  Mood: Depressed and Sad   Orientation:   Person: Yes   Place: Yes  Time of Day: Yes   Date: Answer: difficulty   Situation (Do they understand why they are here?): Yes   Psychomotor Behavior: Normal   Thought Content: Hallucinations  Thought Form: Goal Directed and  Intact    Current Providers and Contact Information   Patient is her own legal guardian.     Psychiatrist: Yes, Dr. Karlee Tyson MD    Has an FRANKLIN been signed? Yes ; For Dr. Jah MD; By: Pt.    Clinical Summary and Recommendations    Clinical summary of assessment (include strengths, protective factors, community resources, and assessment of vulnerability/risk):     Pt is experiencing increased depressed mood with passive SI thoughts of 'take me now', difficulty sleeping, auditory hallucinations that are becoming distracting to talk with other people, low motivation to take care of herself, irritability with other people causing isolation. Pt has been decompensating for past 3 months and is recommended for inpatient for stabilization.        Diagnosis with F Codes:    F29 Unspecified Schizophrenia sepctrum and other psychotic disorder  F39 Unspecified mood disorder  F25.0 Schizoaffective DX, bipolar type    Disposition    Dr. Seun MD was consulted on this case and agrees with the disposition of inpatient.  Pt is also in agreement with the recommendation.     Details of final disposition include: Inpatient mental health .      If Inpatient, is patient admitted voluntary? Yes   Patient aware of potential for transfer if there is not appropriate placement? Yes  Patient is willing to travel outside of the [facility] for placement? NA   Central Intake Notified? Yes: Date: 11/26/2021 Time: 4:00 PM.    Duration of assessment time: .50 hrs    CPT code(s) utilized: 81961, up to 74 minutes      Rachelle Malloy

## 2021-11-26 NOTE — ED TRIAGE NOTES
Patient was diagnosed with stage 4 breast cancer one year ago and has been having trouble ever since. Patient is having trouble coping. Patient is unable to find a way to stay positive. Patient is currently followed by a psychiatrist, who recommended patient present to the ER.

## 2021-11-26 NOTE — TELEPHONE ENCOUNTER
S: Raza, [place] ED, 46/F, psychosis     B: hx of unspecified mood disorder, psychosis, schizoaffective bipolar type  Pt reports dramatic mood swings, hopeless, irritable, isolating   Pt reports AH - negative comments about herself. They will get loud to where it can be hard to hear people in real conversations   Pt reports she will have dreams of her voices   Pt reports AH have been happening for a couple years, but have progressively gotten worse the last three months   Pt reports when she is low, she has a hard time functioning   Pt teary during the interview   Hx of IP MH 2019, hospitalized for 10 months, no IP stays since   Pt denies HI, aggression, SIB   Pt reports the hopelessness is getting to passive SI, no plans or intent   Hx of sub use - has been sober for 2 years     Medical: breast cancer     Medically cleared, eating, drinking, ambulating indep  Patient cleared and ready for behavioral bed placement: Yes   No covid concerns, test ordered     A: Voluntary     R:  Cayetano/Larissa/Vidhya     415pm - Vidhya paged   423pm - Vidhya accepts, would like med rec to be be done in the ED   Pt placed in queue   426pm - unit charge unavil, intake awaiting call back   429pm - Intake spoke w charge RN in the ED, requested med rec be done by pharmacy   450pm - unit charge notified, 615pm for report   451pm - ED charge notified via text page

## 2021-11-27 LAB
AMPHETAMINES UR QL SCN: ABNORMAL
BARBITURATES UR QL: ABNORMAL
BENZODIAZ UR QL: ABNORMAL
CANNABINOIDS UR QL SCN: ABNORMAL
CHOLEST SERPL-MCNC: 132 MG/DL
COCAINE UR QL: ABNORMAL
ETHANOL UR QL SCN: ABNORMAL
HCG UR QL: NEGATIVE
HDLC SERPL-MCNC: 56 MG/DL
HOLD SPECIMEN: NORMAL
LDLC SERPL CALC-MCNC: 64 MG/DL
NONHDLC SERPL-MCNC: 76 MG/DL
OPIATES UR QL SCN: ABNORMAL
TRIGL SERPL-MCNC: 59 MG/DL
TSH SERPL DL<=0.005 MIU/L-ACNC: 1.17 MU/L (ref 0.4–4)

## 2021-11-27 PROCEDURE — 99222 1ST HOSP IP/OBS MODERATE 55: CPT | Mod: AI | Performed by: PSYCHIATRY & NEUROLOGY

## 2021-11-27 PROCEDURE — 84443 ASSAY THYROID STIM HORMONE: CPT | Performed by: NURSE PRACTITIONER

## 2021-11-27 PROCEDURE — 250N000013 HC RX MED GY IP 250 OP 250 PS 637: Performed by: NURSE PRACTITIONER

## 2021-11-27 PROCEDURE — 82465 ASSAY BLD/SERUM CHOLESTEROL: CPT | Performed by: NURSE PRACTITIONER

## 2021-11-27 PROCEDURE — 250N000013 HC RX MED GY IP 250 OP 250 PS 637: Performed by: EMERGENCY MEDICINE

## 2021-11-27 PROCEDURE — 36415 COLL VENOUS BLD VENIPUNCTURE: CPT | Performed by: NURSE PRACTITIONER

## 2021-11-27 PROCEDURE — H2032 ACTIVITY THERAPY, PER 15 MIN: HCPCS

## 2021-11-27 PROCEDURE — 81025 URINE PREGNANCY TEST: CPT | Performed by: NURSE PRACTITIONER

## 2021-11-27 PROCEDURE — 80307 DRUG TEST PRSMV CHEM ANLYZR: CPT | Performed by: PSYCHIATRY & NEUROLOGY

## 2021-11-27 PROCEDURE — 250N000013 HC RX MED GY IP 250 OP 250 PS 637: Performed by: PSYCHIATRY & NEUROLOGY

## 2021-11-27 PROCEDURE — G0177 OPPS/PHP; TRAIN & EDUC SERV: HCPCS

## 2021-11-27 PROCEDURE — 124N000002 HC R&B MH UMMC

## 2021-11-27 RX ORDER — NICOTINE 21 MG/24HR
1 PATCH, TRANSDERMAL 24 HOURS TRANSDERMAL DAILY
Status: DISCONTINUED | OUTPATIENT
Start: 2021-11-27 | End: 2021-11-30 | Stop reason: HOSPADM

## 2021-11-27 RX ORDER — SERTRALINE HYDROCHLORIDE 100 MG/1
100 TABLET, FILM COATED ORAL DAILY
Status: DISCONTINUED | OUTPATIENT
Start: 2021-11-27 | End: 2021-11-30 | Stop reason: HOSPADM

## 2021-11-27 RX ORDER — OLANZAPINE 2.5 MG/1
7.5 TABLET, FILM COATED ORAL AT BEDTIME
Status: DISCONTINUED | OUTPATIENT
Start: 2021-11-27 | End: 2021-11-30 | Stop reason: HOSPADM

## 2021-11-27 RX ADMIN — DOCUSATE SODIUM AND SENNOSIDES 2 TABLET: 8.6; 5 TABLET ORAL at 17:30

## 2021-11-27 RX ADMIN — TOLNAFTATE: 1 CREAM TOPICAL at 21:56

## 2021-11-27 RX ADMIN — Medication 25 MCG: at 08:53

## 2021-11-27 RX ADMIN — DICLOFENAC SODIUM 2 G: 10 GEL TOPICAL at 21:54

## 2021-11-27 RX ADMIN — PANTOPRAZOLE SODIUM 40 MG: 40 TABLET, DELAYED RELEASE ORAL at 08:53

## 2021-11-27 RX ADMIN — OXYCODONE HYDROCHLORIDE 10 MG: 5 TABLET ORAL at 21:52

## 2021-11-27 RX ADMIN — GABAPENTIN 600 MG: 300 CAPSULE ORAL at 08:53

## 2021-11-27 RX ADMIN — OLANZAPINE 7.5 MG: 2.5 TABLET, FILM COATED ORAL at 21:51

## 2021-11-27 RX ADMIN — RIVAROXABAN 20 MG: 10 TABLET, FILM COATED ORAL at 18:11

## 2021-11-27 RX ADMIN — DICLOFENAC SODIUM 2 G: 10 GEL TOPICAL at 11:57

## 2021-11-27 RX ADMIN — OXYCODONE HYDROCHLORIDE 10 MG: 5 TABLET ORAL at 15:23

## 2021-11-27 RX ADMIN — MORPHINE SULFATE 15 MG: 15 TABLET, FILM COATED, EXTENDED RELEASE ORAL at 21:51

## 2021-11-27 RX ADMIN — GABAPENTIN 600 MG: 300 CAPSULE ORAL at 21:52

## 2021-11-27 RX ADMIN — OXYCODONE HYDROCHLORIDE 10 MG: 5 TABLET ORAL at 00:24

## 2021-11-27 RX ADMIN — GABAPENTIN 600 MG: 300 CAPSULE ORAL at 13:55

## 2021-11-27 RX ADMIN — TOLNAFTATE: 1 CREAM TOPICAL at 00:25

## 2021-11-27 RX ADMIN — METHOCARBAMOL 500 MG: 500 TABLET ORAL at 12:00

## 2021-11-27 RX ADMIN — ANASTROZOLE 1 MG: 1 TABLET ORAL at 08:54

## 2021-11-27 RX ADMIN — MORPHINE SULFATE 15 MG: 15 TABLET, FILM COATED, EXTENDED RELEASE ORAL at 08:54

## 2021-11-27 RX ADMIN — DICLOFENAC SODIUM 2 G: 10 GEL TOPICAL at 17:31

## 2021-11-27 RX ADMIN — TOLNAFTATE: 1 CREAM TOPICAL at 11:57

## 2021-11-27 RX ADMIN — NICOTINE 1 PATCH: 21 PATCH, EXTENDED RELEASE TRANSDERMAL at 09:18

## 2021-11-27 RX ADMIN — OXYCODONE HYDROCHLORIDE 5 MG: 5 TABLET ORAL at 09:01

## 2021-11-27 RX ADMIN — SERTRALINE HYDROCHLORIDE 100 MG: 100 TABLET ORAL at 17:29

## 2021-11-27 ASSESSMENT — ACTIVITIES OF DAILY LIVING (ADL)
LAUNDRY: WITH SUPERVISION
DRESS: INDEPENDENT
HYGIENE/GROOMING: INDEPENDENT
DRESS: INDEPENDENT
HYGIENE/GROOMING: INDEPENDENT
ORAL_HYGIENE: INDEPENDENT
ORAL_HYGIENE: INDEPENDENT

## 2021-11-27 NOTE — PROGRESS NOTES
IM updated that patient on unit     WBC   Date Value Ref Range Status   06/23/2021 5.4 4.0 - 11.0 10e9/L Final     WBC Count   Date Value Ref Range Status   11/23/2021 2.9 (L) 4.0 - 11.0 10e3/uL Final     Neutropenic precautions ordered by IM and they will order weekly labs.

## 2021-11-27 NOTE — PLAN OF CARE
"Teresa participated in an OT topic session on the topic of gratitude. Was able to name several people & things she is grateful for, and engaged in a group discussion about the benefit cultivating a \"gratitude habit.\" Talkative with somewhat disorganized speech. Hyperverbal and somewhat pressured. Very pleasant.        11/27/21 1500   Occupational Therapy   Type of Intervention structured groups   Response Initiates, socially acceptable   Hours 1     "

## 2021-11-27 NOTE — PROGRESS NOTES
11/26/21 2125   Patient Belongings   Did you bring any home meds/supplements to the hospital?  No   Patient Belongings locker   Patient Belongings Put in Hospital Secure Location (Security or Locker, etc.) cell phone/electronics;clothing;jewelry;keys;necklace;shoes   Belongings Search Yes   Clothing Search Yes   Second Staff Lenny (RN), Shazia (RN     Following belongings are stored on unit #32, locker #4:  1 - Pair of Sandals  1 - Jahaira Dress  1 - Blue Tank Top  1 - Sweat pants with drawstrings  1 - Black Winter Coat  1 - Winter Hat  2 - Sets of Keys  1 - Cell Phone  1 - Phone   1 - Necklace  3 - Rings    NO belongings were sent to security.                   Admission:  I am responsible for any personal items that are not sent to the safe or pharmacy.  [facility] is not responsible for loss, theft or damage of any property in my possession.    Signature:  _________________________________ Date: _______  Time: _____                                              Staff Signature:  ____________________________ Date: ________  Time: _____      2nd Staff person, if patient is unable/unwilling to sign:    Signature: ________________________________ Date: ________  Time: _____     Discharge:  [place] has returned all of my personal belongings:    Signature: _________________________________ Date: ________  Time: _____                                          Staff Signature:  ____________________________ Date: ________  Time: _____

## 2021-11-27 NOTE — PLAN OF CARE
"Patient visible in lounge, attending groups, reading, watching television and social with peers/staff. Patient has a bright affect and is eating 100%. Patient is med-compliant and cooperative. Patient endorses depression and \"no anxiety today.\"    Patient denies SI/SIB \"today.\"     /79   Pulse 67   Temp 97.6  F (36.4  C) (Temporal)   Resp 16   Ht 1.829 m (6')   Wt 109.5 kg (241 lb 4.8 oz)   LMP 11/26/2020   SpO2 97%   Breastfeeding No   BMI 32.73 kg/m  . Scheduled and PRN (EMAR) pain medication for R hip/back pain \"controls pain.\"    Nursing will continue to monitor.    "

## 2021-11-27 NOTE — PLAN OF CARE
Patient slept approximately 7 hours during the overnight shift; Patient currently appears to be comfortably asleep. Nursing will continue to monitor.

## 2021-11-27 NOTE — PROGRESS NOTES
"SPIRITUAL HEALTH SERVICES  SPIRITUAL ASSESSMENT Progress Note  [facility] ([facility] [address]     REFERRAL SOURCE: I did visit this morning patient Cleaster per [facility] consult order. I introduced myself as the unit  and shared all the info regarding the SHS. Pt was excited to have the  and she said, \"I thank you for you stopping by to visit me. I have some concern and spiritual questions. I hear some whispering voice and I believe that was direct from [place]oco. I am not good at praying. How can I protect myself from the evil spirit and what shall I do?\" After I listened the pt reflectively to all what she have in her mind, then I shared some spiritual uplifting words from Bible verses and I also shared a method of prayer. I strongly told her to share what she mentioned to me with her Doctor regarding hearing some voices, and she agreed to do that. After I shared some hope giving messages, at the end of our conversation, I offered her a prayer.    PLAN: I will remain open to provide spiritual care for the pt as needed.    Jv Fajardo M.Div. ([place]), M.Th., D.Min., [facility]  Staff   Pager [phone]    "

## 2021-11-27 NOTE — PROGRESS NOTES
Verified with PHARMACY that gloves ONLY are needed for handling arimidex.    Verified with LAB that urine should be sent double bagged -no further special handling.
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
## 2021-11-27 NOTE — PLAN OF CARE
"  Problem: Depressive Symptoms  Goal: Depressive Symptoms  Description: Signs and symptoms of listed problems will be absent or manageable.  Outcome: No Change   Patient admitted voluntarily from ED with passive suicidal ideation r/t stage 4 breast cancer and family dysfunction.  Patient states she has been told there is \"no cure\" for her type of cancer but she continues to take oral antineoplastic agents to hold it at bay.    One of these is compounded for her by a pharmacy in [place] and will be brought in by her family for use here per provider order.  Patient contracts for safety in the hospital.  She is polite and cooperative; endorses severe back pain r/t cancer.  Patient reports she was hospitalized three times within a ten month period several years ago in [place] with psychotic symptoms related to cocaine use.  She now reports constant auditory  hallucinations of derogatory laughter.  Patient reports decreased appetite d/t depression and recent 20 lb weight loss.  She was given PRN ibuprofen at HS for breakthrough pain.  Order placed for spiritual health consult at patient's request.  "

## 2021-11-27 NOTE — H&P
"Admitted: 11/26/2021    PSYCHIATRIC EVALUATION    The patient was seen for 53 minutes on station 32 on 11/27/2021.  Greater than 50% of the time was spent on counseling and coordinating care, clarifying diagnostic and prognostic issues, presence of support in community, and the patient's specific concerns about irritability and anger outbursts.    CHIEF COMPLAINT AND REASON FOR ADMISSION:  The patient is a 46-year-old female who reports that she has been having ups and downs for the past 3 months, recently feeling very down with thoughts of hopelessness, passive thoughts of suicide.  She was brought in to this hospital by her daughter and agreed to stay in the hospital voluntarily.    HISTORY OF PRESENT ILLNESS:  The patient presents as a reasonably reliable historian.  She stated that she is from [place], [place] and moved with 3 of her children from [place] to [place] 1-1/2 years ago.  Said that she has family here and currently staying with cousin, however, does not like his housing very well because cousin makes parties and people drink.  Reports that she had symptoms indicating cancer, however, was not diagnosed in [place], [place].  Said that she was diagnosed after she moved to [facility] with breast cancer and recent followed up to [facility] for doing that.  Said that she is getting help from an oncologist.  She sees a psychiatrist here, Dr. Tyson, but not therapist.  She reports auditory hallucinations.  States that \"people who I don't know laughing at me.\"  She denies command hallucinations.  Reports using THC a couple of times per month and alcohol 1 or 2 glasses once a month also.  Reports significant irritability, isolation.  Reports that voices have been happening for a couple of years and have increased in the past few months.  Also reported that she is having a hard time making decisions and often confused what year and date is.    PAST PSYCHIATRIC HISTORY:  Has a current " diagnosis of unspecified psychosis, unspecified mood disorder, historical diagnosis of schizoaffective disorder, bipolar type and bipolar diagnosis.  I spent with the patient significant amount of time explaining the classical symptoms of tom.  She had clear difficulty understanding it.  However, when was explained again and again symptoms of tom said that she probably never had full blown manic episodes, but did have episodes of not sleeping for a few nights in a row, being super energetic and cleaning house, including cleaning the walls.  She reports that she was hospitalized multiple times in [place], always for depression, but was also in group home.  She did not want to talk about why she was in group home, but admitted that she can be pretty irritable, including irritability with her family members and people that she loves.  Reports history of chemical addiction including using cocaine, but not recently.  Currently uses THC.  Has very remote history of overdose.  Reported that last hospitalization was in [id number] for 10 months for mental health.    FAMILY MENTAL HEALTH AND CHEMICAL HEALTH HISTORY:  Reports a family history of schizophrenia, bipolar; history of drug use.  Said that 2 cousins killed themselves and 3 nieces schizophrenia, one niece has autism, one nephew has schizophrenia.  The patient is , said that she has 5 grownup kids and 6 grandchildren.  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    PAST MEDICAL HISTORY:  Significant for:  1.  Breast cancer.  2.  History of deep venous thrombosis.  3.  Pulmonary emboli.  4.  Pyelonephritis.  5.  Mental health.    PAST SURGICAL HISTORY:  Significant for:  1.  Repair of lumbar kyphoplasty vertebra.  2.  Laparoscopic salpingo-oophorectomy.  3.  Tubal ligations.    ALLERGIES:  PATIENT REPORTS BEING ALLERGIC TO CONTRAST DYE.    HOME MEDICATIONS:    1.  Arimidex 1 mg  daily.  2.  Diclofenac 1% topical gel apply 2 grams topically 4 times a day.  3.  Gabapentin 600 mg every morning and 600 mg at 2:00 p.m. and 600 mg at bedtime.  4.  Ibuprofen 800 mg every 6 hours as needed for moderate pain.  5.  Robaxin 500 mg 4 times a day as needed for muscle spasms.  6.  Morphine 15 mg every 12 hours.  7.  Narcan 4 mg into 1 nostril, 1 spray alternating nostrils as needed for opiate reversal every 2-3 minutes until assistance arrives.  8.  Nicorette 2 mg every hour as needed for smoking cessation.  9.  Zyprexa 2.5 mg in the morning and 2.5 mg at bedtime.  The patient takes 5 mg at bedtime.  10.  Zofran 4 mg every 6 hours as needed for nausea, vomiting.  11.  Oxycodone 5-10 mg every 6 hours as needed for moderate to severe pain.   12.  Ibrance 125 mg daily, take for 21 days, then 7 days off.  13.  Protonix 40 mg every morning.  14.  Polyethylene glycol 17 grams daily as needed for constipation.  15.  Compazine 10 mg every 6 hours as needed for nausea.    16.  Xarelto 20 mg daily with dinner.  17.  Senna 8.6/50 mg, take 2 tablets 2 times a day as needed for constipation.   18.  Tolnaftate 1% external cream, apply topical 2 times a day.  19.  Vitamin D3 25 mcg daily.    PHYSICAL EXAMINATION/REVIEW OF SYSTEMS:  For physical examination and 12-point review of systems, please refer to Boris Kohli's note from 11/26/2021.  I reviewed this note and agree with it.  VITAL SIGNS:  Temperature 97.6, respirations 16, heart rate 86, blood pressure 136/86.    MENTAL STATUS EXAMINATION:  The patient is an overweight  female, dressed in hospital garbs.  She is overall cooperative and pleasant, but clearly anxious, talks fast, but not pressured speech.  Maintains good eye contact.  Reports above-mentioned auditory hallucinations, but denied them at the moment of interview.  Denies visual hallucinations.  Reports passive suicidal thoughts, but contracts for safety here.  Denies homicidal  ideation.  Thought processes appeared to be somewhat loose, but overall logical and associations overall tight.  She is alert and oriented x 3.  Fund of knowledge is average with proper usage of vocabulary.  Ability to focus, concentrate mildly impaired.  The patient complains of difficulties with short and immediate but not long-term memory.  Gait and posture all within normal limits.  Insight and judgment moderately impaired.    IMPRESSION:    1.  Historical diagnosis of bipolar affective disorder, apparently type 2, depressed versus major depressive disorder, current, severe with psychotic features.  2.  History of polysubstance abuse, in remission (according to the patient she still uses marijuana a couple of times per week).  3.  Breast cancer.  The patient is receiving treatment for it.    TREATMENT PLAN:  Patient will stay at this hospital voluntarily.  I suggested to increase her Zyprexa to 7.5 mg daily.  She will be restarted on her Zoloft 100 mg daily.  She, at least at this point in time, does not show any symptoms of tom.  She is agreeable to stay in the hospital voluntarily.  Her care will be taken over on Monday by primary care team.      Ravinder Manning MD        D: 2021   T: 2021   MT: [facility]    Name:     RYANN MANN  MRN:      [id number]        Account:     [id number]   :      1975           Admitted:    2021       Document: [id number]

## 2021-11-27 NOTE — PROGRESS NOTES
11/26/21 2100   Groups   Details   (Psychotherapy)   Number of patients attending the group:  5  Group Length:  1 Hours    Group Therapy Type: Psychotherapy    Summary of Group / Topics Discussed:      The  Psychotherapy group goal is to promote insight to positive choice and change. Group processing is within a supportive and safe environment. Patients will process emotions using verbal group and expressive psychotherapy interventions including visual art/writing interventions.    Group interventions support patients by: self esteem and mental health management    Modalities to reach these goals include: positive/solution focused psychology  and Narrative psychology    Subjective -patient report of mood today-Ok    Objective/ Intervention- Goal of group and Therapeutic modality utilized- Verbal Process group    Group Response- engaged    Patient Response-Pt was engaged. She had just arrived to the unit. She at times seemed to be lost in thought and watching the night view out the window. She wanted to get help and feels the best when she is around her grandchildren.     Rei Mayer, LESLY, ATR-BC

## 2021-11-28 PROCEDURE — 124N000002 HC R&B MH UMMC

## 2021-11-28 PROCEDURE — 250N000013 HC RX MED GY IP 250 OP 250 PS 637: Performed by: EMERGENCY MEDICINE

## 2021-11-28 PROCEDURE — H2032 ACTIVITY THERAPY, PER 15 MIN: HCPCS

## 2021-11-28 PROCEDURE — 250N000013 HC RX MED GY IP 250 OP 250 PS 637: Performed by: NURSE PRACTITIONER

## 2021-11-28 PROCEDURE — 250N000013 HC RX MED GY IP 250 OP 250 PS 637: Performed by: PSYCHIATRY & NEUROLOGY

## 2021-11-28 RX ADMIN — DICLOFENAC SODIUM 2 G: 10 GEL TOPICAL at 11:48

## 2021-11-28 RX ADMIN — DICLOFENAC SODIUM 2 G: 10 GEL TOPICAL at 15:51

## 2021-11-28 RX ADMIN — PANTOPRAZOLE SODIUM 40 MG: 40 TABLET, DELAYED RELEASE ORAL at 09:25

## 2021-11-28 RX ADMIN — ANASTROZOLE 1 MG: 1 TABLET ORAL at 09:24

## 2021-11-28 RX ADMIN — OXYCODONE HYDROCHLORIDE 10 MG: 5 TABLET ORAL at 09:24

## 2021-11-28 RX ADMIN — GABAPENTIN 600 MG: 300 CAPSULE ORAL at 15:50

## 2021-11-28 RX ADMIN — GABAPENTIN 600 MG: 300 CAPSULE ORAL at 21:31

## 2021-11-28 RX ADMIN — IBUPROFEN 800 MG: 400 TABLET ORAL at 18:06

## 2021-11-28 RX ADMIN — SERTRALINE HYDROCHLORIDE 100 MG: 100 TABLET ORAL at 09:23

## 2021-11-28 RX ADMIN — OLANZAPINE 7.5 MG: 2.5 TABLET, FILM COATED ORAL at 21:31

## 2021-11-28 RX ADMIN — GABAPENTIN 600 MG: 300 CAPSULE ORAL at 09:23

## 2021-11-28 RX ADMIN — TOLNAFTATE: 1 CREAM TOPICAL at 21:30

## 2021-11-28 RX ADMIN — METHOCARBAMOL 500 MG: 500 TABLET ORAL at 17:27

## 2021-11-28 RX ADMIN — TOLNAFTATE: 1 CREAM TOPICAL at 09:28

## 2021-11-28 RX ADMIN — OXYCODONE HYDROCHLORIDE 5 MG: 5 TABLET ORAL at 21:31

## 2021-11-28 RX ADMIN — Medication 25 MCG: at 09:24

## 2021-11-28 RX ADMIN — DICLOFENAC SODIUM 2 G: 10 GEL TOPICAL at 09:25

## 2021-11-28 RX ADMIN — NICOTINE 1 PATCH: 21 PATCH, EXTENDED RELEASE TRANSDERMAL at 09:23

## 2021-11-28 RX ADMIN — MORPHINE SULFATE 15 MG: 15 TABLET, FILM COATED, EXTENDED RELEASE ORAL at 09:24

## 2021-11-28 RX ADMIN — METHOCARBAMOL 500 MG: 500 TABLET ORAL at 11:49

## 2021-11-28 RX ADMIN — OXYCODONE HYDROCHLORIDE 10 MG: 5 TABLET ORAL at 17:27

## 2021-11-28 RX ADMIN — METHOCARBAMOL 500 MG: 500 TABLET ORAL at 21:31

## 2021-11-28 RX ADMIN — RIVAROXABAN 20 MG: 10 TABLET, FILM COATED ORAL at 20:25

## 2021-11-28 RX ADMIN — DICLOFENAC SODIUM 2 G: 10 GEL TOPICAL at 21:30

## 2021-11-28 RX ADMIN — MORPHINE SULFATE 15 MG: 15 TABLET, FILM COATED, EXTENDED RELEASE ORAL at 21:32

## 2021-11-28 ASSESSMENT — ACTIVITIES OF DAILY LIVING (ADL)
DRESS: INDEPENDENT
HYGIENE/GROOMING: INDEPENDENT
ORAL_HYGIENE: INDEPENDENT
LAUNDRY: WITH SUPERVISION

## 2021-11-28 ASSESSMENT — MIFFLIN-ST. JEOR: SCORE: 1863.31

## 2021-11-28 NOTE — PROVIDER NOTIFICATION
"0900: NP On-CALL notified patient c/o BLE edema. HOLD gabapentin until NP discusses with IM (and NP will update).    IM met with patient-OK to give gabapentin.   -Continue to monitor BLE \"swellling.\"    Nursing will continue to monitor.  "

## 2021-11-28 NOTE — PROGRESS NOTES
Brief Medicine Note   27 November 2021       Contacted by RN while on unit that patient with new bilateral lower extremity swelling last 1-2 days.  Per d/w patient, appears much better today.  Denies pain, numbness, and tingling.  Appears to have very trace non pitting edema bilaterally today.  Pt has a history of DVTs and PEs, but has been on Xarelto since 2014 with good compliance.  Medications reviewed, but gabapentin may be contributory (2-8% incidence per Up to Date, but pt has been on this medication long term) and arimidex (5-10% incidience).  I do not recommend any medication changes at this time.  Please call IM if swelling worsens or if new chest pain or shortness of breath.     Carly Sanches, CNP, APRN  Internal Medicine [facility]  Pager [phone]

## 2021-11-28 NOTE — PLAN OF CARE
"  Problem: General Rehab Plan of Care  Goal: Therapeutic Recreation/Music Therapy Goal (Art Therapy)  Description: The patient and/or their representative will achieve their patient-specific goals related to the plan of care.  The patient-specific goals include:  Outcome: Improving   Pt participated in Art Therapy directive \"postcard from future self\" using art medium of pts choice (Assemblys) and attended the full hour of group.  Goals of directive: To identify personal strengths and goals, to assess pts motivation for change, emotional expression, future focused directive.  Pt was a positive participant, focused on task for the full duration of group. Pt finished art and briefly shared with group.  Pt created an image that she described as \"finding the cure for cancer\" and diamante a pink ribbon in the center of the page. Pt said that she was thankful she was able to express her feeling through art this evening. Pt said she has \"bottled up\" all her feelings and emotions due to the people in her support system also going through difficult things and pt referred to putting all her efforts into helping others.  Pts mood was calm, pleasant participant.  "

## 2021-11-28 NOTE — PLAN OF CARE
"  Problem: Depressive Symptoms  Goal: Depressive Symptoms  Description: Signs and symptoms of listed problems will be absent or manageable.  Outcome: No Change  Flowsheets (Taken 11/27/2021 2042)  Depressive Symptoms Assessed: all  Depressive Symptoms Present: mood   Patient's cousin has not yet brought in her own supply of Ibrance anti-neoplastic medication.  Writer gave her a N-95 mask with metal bar removed per neutropenic protocol.  VS WNL.  Bilateral feet and ankle edema persists.  Patient stated she has had this before \"and they gave me some medicine.\"  Writer discussed cellulitis with patient and she stated she has been scratching these areas because they itch so much, even after application of Tinactin.  Patient has spent much of the evening on the phone, and her affect is brighter tonight.  She denied SI and auditory hallucinations; rated back and right hip pain a 6.  She received one dose of Roxicodone 10 mg for pain as well as Senna for constipation.              /77 (BP Location: Right arm, Patient Position: Sitting)   Pulse 60   Temp 97.9  F (36.6  C) (Temporal)   Resp 16   Ht 1.829 m (6')   Wt 109.5 kg (241 lb 4.8 oz)   LMP 11/26/2020   SpO2 95%   Breastfeeding No   BMI 32.73 kg/m      "

## 2021-11-28 NOTE — PLAN OF CARE
"Patient slept until 0900 this morning and upon awakening for breakfast (ate 100%) reported \"more pain this morning R hip and back (has soft care mattress). Scheduled MS contin, diclofenac gel and PRN Roxicodone provided (0930) with patient reporting \"it helps.\" Patient c/o BLE swelling addressed by IM-will continue to monitor. Patient states \"dental appointment at  on Monday  needs to be made or rescheduled.\" Patient summary includes the following appointment:   11/30/2021 11:15 AM Arrive by 11:00 AM VIDEO VISIT [facility] min [facility]      Patient has a bright affect, accepts phone calls and is social/appropriate with staff peers when out of room. Patient has spent more time in room this shift \"because of increased back pain.\" Patient gait is slower in morning r/t \"stiffness.\" Patient is med-compliant and independent with ADLs.     Patient endorses anxiety and depression verbalizing \"appreciation for Homer\" and understanding of medication changes. Patient reminded flush 2 X r/t medication-verbalizes/demonstrates understanding.     Patient denies SI/SIB.    /74 (BP Location: Right arm, Patient Position: Left side)   Pulse 55   Temp 98.2  F (36.8  C) (Oral)   Resp 16   Ht 1.829 m (6')   Wt 109.5 kg (241 lb 4.8 oz)   LMP 11/26/2020   SpO2 99%   Breastfeeding No   BMI 32.73 kg/m  . PRN Roxicodone at 0930 and PRN Robaxin at 1200 for R hip/back pain \"helps.\"    1300: Patient family brought IBRANCE -taken to pharmacy for label.    Nursing will continue to monitor.      "

## 2021-11-28 NOTE — PHARMACY
Per [facility], if patient's family brings in her supply of Ibrance, once it is verified by [facility], it can be given by non-chemo-certified staff wearing double gloves.

## 2021-11-29 PROCEDURE — 250N000013 HC RX MED GY IP 250 OP 250 PS 637: Performed by: PSYCHIATRY & NEUROLOGY

## 2021-11-29 PROCEDURE — 250N000013 HC RX MED GY IP 250 OP 250 PS 637: Performed by: NURSE PRACTITIONER

## 2021-11-29 PROCEDURE — H2032 ACTIVITY THERAPY, PER 15 MIN: HCPCS

## 2021-11-29 PROCEDURE — 124N000002 HC R&B MH UMMC

## 2021-11-29 PROCEDURE — G0177 OPPS/PHP; TRAIN & EDUC SERV: HCPCS

## 2021-11-29 PROCEDURE — 99233 SBSQ HOSP IP/OBS HIGH 50: CPT | Performed by: NURSE PRACTITIONER

## 2021-11-29 PROCEDURE — 250N000013 HC RX MED GY IP 250 OP 250 PS 637: Performed by: EMERGENCY MEDICINE

## 2021-11-29 RX ORDER — BENZTROPINE MESYLATE 0.5 MG/1
0.5 TABLET ORAL 3 TIMES DAILY PRN
Status: DISCONTINUED | OUTPATIENT
Start: 2021-11-29 | End: 2021-11-30 | Stop reason: HOSPADM

## 2021-11-29 RX ADMIN — SERTRALINE HYDROCHLORIDE 100 MG: 100 TABLET ORAL at 08:40

## 2021-11-29 RX ADMIN — GABAPENTIN 600 MG: 300 CAPSULE ORAL at 08:39

## 2021-11-29 RX ADMIN — TOLNAFTATE: 1 CREAM TOPICAL at 21:46

## 2021-11-29 RX ADMIN — DICLOFENAC SODIUM 2 G: 10 GEL TOPICAL at 08:41

## 2021-11-29 RX ADMIN — MORPHINE SULFATE 15 MG: 15 TABLET, FILM COATED, EXTENDED RELEASE ORAL at 21:48

## 2021-11-29 RX ADMIN — BENZTROPINE MESYLATE 0.5 MG: 0.5 TABLET ORAL at 13:33

## 2021-11-29 RX ADMIN — METHOCARBAMOL 500 MG: 500 TABLET ORAL at 15:52

## 2021-11-29 RX ADMIN — Medication 25 MCG: at 08:40

## 2021-11-29 RX ADMIN — METHOCARBAMOL 500 MG: 500 TABLET ORAL at 10:42

## 2021-11-29 RX ADMIN — IBUPROFEN 800 MG: 400 TABLET ORAL at 18:35

## 2021-11-29 RX ADMIN — OXYCODONE HYDROCHLORIDE 10 MG: 5 TABLET ORAL at 15:51

## 2021-11-29 RX ADMIN — ANASTROZOLE 1 MG: 1 TABLET ORAL at 08:40

## 2021-11-29 RX ADMIN — DICLOFENAC SODIUM 2 G: 10 GEL TOPICAL at 21:46

## 2021-11-29 RX ADMIN — DICLOFENAC SODIUM 2 G: 10 GEL TOPICAL at 15:49

## 2021-11-29 RX ADMIN — METHOCARBAMOL 500 MG: 500 TABLET ORAL at 21:45

## 2021-11-29 RX ADMIN — NICOTINE 1 PATCH: 21 PATCH, EXTENDED RELEASE TRANSDERMAL at 08:40

## 2021-11-29 RX ADMIN — TOLNAFTATE: 1 CREAM TOPICAL at 08:41

## 2021-11-29 RX ADMIN — OLANZAPINE 7.5 MG: 2.5 TABLET, FILM COATED ORAL at 21:49

## 2021-11-29 RX ADMIN — DOCUSATE SODIUM AND SENNOSIDES 2 TABLET: 8.6; 5 TABLET ORAL at 18:34

## 2021-11-29 RX ADMIN — RIVAROXABAN 20 MG: 10 TABLET, FILM COATED ORAL at 18:35

## 2021-11-29 RX ADMIN — GABAPENTIN 600 MG: 300 CAPSULE ORAL at 21:48

## 2021-11-29 RX ADMIN — MORPHINE SULFATE 15 MG: 15 TABLET, FILM COATED, EXTENDED RELEASE ORAL at 08:40

## 2021-11-29 RX ADMIN — PANTOPRAZOLE SODIUM 40 MG: 40 TABLET, DELAYED RELEASE ORAL at 08:40

## 2021-11-29 RX ADMIN — GABAPENTIN 600 MG: 300 CAPSULE ORAL at 13:33

## 2021-11-29 ASSESSMENT — ACTIVITIES OF DAILY LIVING (ADL)
LAUNDRY: WITH SUPERVISION
DRESS: INDEPENDENT
HYGIENE/GROOMING: INDEPENDENT
ORAL_HYGIENE: INDEPENDENT

## 2021-11-29 NOTE — PLAN OF CARE
"  Problem: Depressive Symptoms  Goal: Depressive Symptoms  Description: Signs and symptoms of listed problems will be absent or manageable.  Outcome: Improving   Patient described herself as \"feeling low because I'm in so much pain\" at the start of the shift.  After receiving her 1400 dose of Neurontin, PRN Roxicodone  10 mg, PRN Robaxin 500 mg, and PRN Ibuprofen 800 mg before 1800, she felt better.  Affect was bright, she was social with peers and attended group, and she could move more easily.  Patient took several phone calls from family.  She denied SI, rated depression 7 out of 10, and also denied auditory hallucinations and medication side effects.   Patient stated edematous feet and ankles are not as swollen as last evening.  Writer offered her some unit stock lotion but she said the Tinactin cream is helpful with dry skin and pruritis.   Patient received additional doses of Roxicodone and Robaxin at HS.  "

## 2021-11-29 NOTE — PROGRESS NOTES
BRIEF NUTRITION SERVICES CONSULT  MST score of 3: positive  for weight loss of 14-23 lbs and positive  for poor oral intake related to decreased appetite. Per chart review pt has gained 7# in 1 month and 15# in ~1 year. Has had non significant wt loss of 3# (1.2%) in 3 months and 10# (3.9%) in 6 months.   Wt Readings from Last 10 Encounters:   11/28/21 111.1 kg (245 lb)   11/23/21 112.1 kg (247 lb 3.2 oz)   10/12/21 108.1 kg (238 lb 6.4 oz)-1 month   09/13/21 109.8 kg (242 lb)   08/16/21 112.5 kg (248 lb)-3 months   08/10/21 114.8 kg (253 lb)   08/05/21 114.8 kg (253 lb)   07/19/21 111.5 kg (245 lb 12.8 oz)   06/23/21 115.3 kg (254 lb 1.6 oz)   05/27/21 115.9 kg (255 lb 9.6 oz)   12/05/21 104.4 kg (230 lb 2.6 oz) ~1 year     Per notes and flowsheet intakes/appetite have been good since admit, eating % of meals and ordering (on average) 2597kcal and 88g of protein per day. RD to sign off, please consult should needs arise.    Micheline Landry MS, RD, LDN  Unit Pager [phone]

## 2021-11-29 NOTE — PLAN OF CARE
"  Problem: General Rehab Plan of Care  Goal: Therapeutic Recreation/Music Therapy Goal  Description: The patient and/or their representative will achieve their patient-specific goals related to the plan of care.  The patient-specific goals include:  Outcome: No Change     Teresa attended art therapy group for about one hour (5 patients total). She reported feeling \"okay\" and \"hopeful.\" She participated in the art directive to make an inside/outside portrait (inside depicting your true self and outside depicting how others perceive you). She appeared calm and focused while drawing. She shared her drawing with the group and explained how it depicts her strength. She interacted appropriately with peers, offering friendly comments.   "

## 2021-11-29 NOTE — PLAN OF CARE
Received patient in her bed sleeping. Writer woke patient up to complete Q4H vital sign assessment. Vital signs WDL. When woken up for assessment, patient reported pain in her back and requested that writer look to see if there were any medications available for pain and/or nausea, but also stating she was aware she had taken most of her pain medications before going to bed. Writer prepared acetaminophen and compazine to administer (as that is what she had available). By that time, patient was sleeping soundly in her bed so administration was canceled and medications were returned to [facility]. During the interaction, patient also requested to not be woken up again for vitals assessment during the overnight shift. Patient continues to appear to be comfortably sleeping with even and unlabored breathing. Nursing will continue to monitor.

## 2021-11-29 NOTE — PROGRESS NOTES
"[facility],  Psychiatric Progress Note      Impression:     Teresa (pronounced \"clee-Maricruz\") Kin is a 46-year-old female admitted to [facility] on 11/26/2021.  She was admitted as a voluntary patient through the ER due to auditory hallucinations and depressive symptoms with passive suicidal ideation.  Stressors include metastatic breast cancer with bone pain and two of her children not visiting for Thanksgiving.  Since admission, Zoloft was continued.  Zyprexa was increased.  PRNs of Zyprexa, Hydroxyzine and Trazodone were continued.  She is currently denying auditory hallucinations.  She reports her mood is improving.  She denies suicidal ideation.         Diagnoses:     Unspecified psychosis (differentials include schizoaffective disorder, bipolar disorder, major depressive disorder with psychosis and bipolar disorder)  Left-sided breast cancer with metastasis to bone  Bone pain related to cancer  History of DVT  History of pulmonary emboli         Plan:     Medications:  Continue Zoloft 100 mg daily.  Continue Zyprexa 7.5 mg at HS.  Continue PRNs of Zyprexa, Hydroxyzine and Trazodone.  Add PRN Cogentin 0.5 mg.    Discharge to home when stable.  She has outpatient psychiatry.  Her daughter is her PCA.  Recommend a therapy referral and online cancer support groups.        Attestation:  Patient has been seen and evaluated by me, RENO Gonzales CNP  The patient was counseled on nature of illness and treatment plan/options  Care was coordinated with treatment team       Clinical Global Impressions  First:  Considering your total clinical experience with this particular patient population, how severe are the patient's symptoms at this time?: 5 (11/29/21 1436)  Compared to the patient's condition at the START of treatment, this patient's condition is: 2 (11/29/21 1436)  Most recent:  Considering your total clinical experience with this " "particular patient population, how severe are the patient's symptoms at this time?: 5 (11/29/21 1436)  Compared to the patient's condition at the START of treatment, this patient's condition is: 2 (11/29/21 1436)            Interim History:     The patient's care was discussed with the treatment team and chart notes were reviewed.  Pt was documented as sleeping 7, 7 and 6.5 hours during the weekend overnight shifts.  She has been attending groups and spending time in the milieu.  She has been using PRNs of Robaxin, Oxycodone and Ibuprofen.  Reports bone pain is sometimes 7 or 8 out of 10 but she is able to tolerate it.  \"I don't want nobody to feel sorry for me.\"  Pt states it has been helpful to talk to staff and other patients and to attend groups.  She discussed feeling very disappointed that 2 of her children from out of state could not attend the Thanksgiving celebration at her house.  She discussed how time is different with a metastatic cancer diagnosis.  She stated that the cancer \"stopped growing\" and that no providers have given her an estimate as to how long she will survive.  She denies hallucinations and noted she only hears auditory hallucinations of sinister laughter and whispers when she is sad.  She denies suicidal ideation.  She reports some middle insomnia but overall is sleeping well.  Pt was mildly pressured and hyperverbal during the conversation.  She complains of new onset muscle twitching in her hands.  She tried PTA medication Robaxin which was not beneficial.  Unclear if this is a side effect from Zyprexa.  Will offer PRN Cogentin.           Medications:     Current Facility-Administered Medications   Medication     acetaminophen (TYLENOL) tablet 650 mg     alum & mag hydroxide-simethicone (MAALOX) suspension 30 mL     anastrozole (ARIMIDEX) tablet 1 mg     benztropine (COGENTIN) tablet 0.5 mg     diclofenac (VOLTAREN) 1 % topical gel 2 g     gabapentin (NEURONTIN) capsule 600 mg     " "hydrOXYzine (ATARAX) tablet 25 mg     ibuprofen (ADVIL/MOTRIN) tablet 800 mg     methocarbamol (ROBAXIN) tablet 500 mg     morphine (MS CONTIN) 12 hr tablet 15 mg     nicotine (NICODERM CQ) 21 MG/24HR 24 hr patch 1 patch     nicotine (NICORETTE) gum 2 mg     nicotine Patch in Place     OLANZapine (zyPREXA) tablet 10 mg    Or     OLANZapine (zyPREXA) injection 10 mg     OLANZapine (zyPREXA) tablet 7.5 mg     oxyCODONE (ROXICODONE) tablet 5-10 mg     palbociclib (IBRANCE) tablet 125 mg     pantoprazole (PROTONIX) EC tablet 40 mg     polyethylene glycol (MIRALAX) powder 17 g     prochlorperazine (COMPAZINE) tablet 10 mg     rivaroxaban ANTICOAGULANT (XARELTO) tablet 20 mg     senna-docusate (SENOKOT-S/PERICOLACE) 8.6-50 MG per tablet 2 tablet     sertraline (ZOLOFT) tablet 100 mg     tolnaftate (TINACTIN) 1 % cream     traZODone (DESYREL) tablet 50 mg     Vitamin D3 (CHOLECALCIFEROL) tablet 25 mcg             Allergies:     Allergies   Allergen Reactions     Contrast Dye      Pt developed nausea after isovue 370 injection on 6/9/21             Psychiatric Examination:     /71 (BP Location: Right arm, Patient Position: Sitting)   Pulse 70   Temp 98.4  F (36.9  C) (Oral)   Resp 16   Ht 1.829 m (6')   Wt 111.1 kg (245 lb)   LMP 11/26/2020   SpO2 99%   Breastfeeding No   BMI 33.23 kg/m      Appearance:  awake, alert, adequately groomed and dressed in hospital scrubs  Attitude:  cooperative  Eye Contact:  good  Mood:  \"happier\"  Affect:  intensity is mildly heightened  Speech:  clear, coherent, mildly pressured  Psychomotor Behavior:  no evidence of tardive dyskinesia, dystonia, or tics  Thought Process:  linear and goal oriented  Associations:  no loose associations  Thought Content:  no evidence of suicidal ideation or homicidal ideation, denies all psychotic symptoms  Insight:  fair  Judgment:  fair  Oriented to:  date, time, person, and place  Attention Span and Concentration:  fair  Recent and Remote " Memory:  fair  Language:  intact, fluent English  Fund of Knowledge:  appropriate  Muscle Strength and Tone:  normal  Gait and Station:  normal         Labs:     No results found for this or any previous visit (from the past 24 hour(s)).   (3) no apparent problem

## 2021-11-29 NOTE — PLAN OF CARE
Patient slept approximately 6.5 hours during the overnight shift. Patient currently in bed comfortably asleep. No issues or concerns reported or observed. Nursing will continue to monitor.

## 2021-11-29 NOTE — PLAN OF CARE
BEHAVIORAL TEAM DISCUSSION    Participants: Provider: Aisha Kee NP    CTC: Rut Sibley MS,LP    Nurse: Janneth Quintana, RN  OT: Candido Orr     Progress: Pt was admitted to [facility] on a voluntary basis secondary to depression with some auditory hallucinations. There is question as to whether pt is now demonstrating some hypomania. Pt has breast cancer, struggles with that plus past trauma.  Anticipated length of stay: 1-2 days  Continued Stay Criteria/Rationale: pt to remain inpatient for further observation due to noted hypomania  Medical/Physical: pt has breast cancer  Precautions:   Behavioral Orders   Procedures     Assault precautions     Code 1 - Restrict to Unit     Discontinue 1:1 attendant for suicide risk     Order Specific Question:   I have performed an in person assessment of the patient     Answer:   Based on this assessment the patient no longer requires a one on one attendant at this point in time.     Order Specific Question:   Rationale     Answer:   Patient States able to remain safe in hospital     Neutropenic (Protective) precautions     Routine Programming     As clinically indicated     Status 15     Every 15 minutes.     Plan: The patient will continue to meet w/ the treatment team for assessment and treatment planning, CTC will continue to work w/ for discharge planning.    Rationale for change in precautions or plan: Pt to remain inpatient for safety and ongoing stabilization.

## 2021-11-29 NOTE — PLAN OF CARE
Assessment/Intervention/Current Symptoms and Care Coordination:   CTC spoke with pt; discussed discharge plan    Discharge Plan or Goal:  Discharge home with outpatient services    Barriers to Discharge:  Mental health symptoms being addressed with continued hospitalization and medication adjustments.      Referral Status:   Appointments made    Legal Status:   voluntary

## 2021-11-29 NOTE — PLAN OF CARE
"Pt was visible in milieu, social and appropriate with peers. She attended community meeting and some of the other OT groups. Pt reported having \"muscle spasms\" where her hand would suddenly jerk and made her spill her coffee twice. She requested prn robaxin to help with the spasms.  Pt was compliant with medications and ate meals. She reported having pain in her hip and back but declined any interventions and/or prns. Speech is slightly pressured at times. No SI/SIB noted or observed. Will continue to monitor.   "

## 2021-11-29 NOTE — PLAN OF CARE
"[facility]  Occupational Therapy Behavioral Health Assessment    Patient Name: Teresa Sanderson    Today Teresa  attended 2 of 3 OT groups today. Pleasant, talkative, bright. Slightly pressured and hyperverbal. Slightly distractible in her use of clinic supplies. Very agreeable and warm.    Description: OT staff met with pt to review the role of occupational therapy and explain the value of having them involved in their treatment plan including options to meet current needs/self-identified goals. The below evaluation is a compilation of functional performance observation and information obtained from an OT self-assessment.     Pt identified \"negative thoughts, hopelessness, ashamed of the the way I look\" as the reason for the current hospitalization.    Pt endorsed an understanding of symptoms on function. Pt identified experiencing the following symptoms, selected from checklist provided:     Feelings - sadness, anxiety / fear, mood swings, helpless, depressed, overwhelmed     Thoughts - negative, trouble concentrating and making decisions, obsessive    Behaviors - withdrawal / spending too much time alone,   problems starting / completing projects, increased substance use, financial concerns / spending problems, trouble asking for help, procrastinating    Pt identifies as goals for this hospitalization:  Identify & express feelings  Find resources and support (treatment, therapy, support groups, housing, etc)  Learn to make choices and take action  Improve problem solving skills  Acceptance of \"who I am and what is going to happen\"  Improve focus/concentration  Manage anger  Address medications \"and keeping doctor appts; getting my smile back and stand on how I feel\"    Pt identified the following supports outside the hospital: \"family??? Friend from out of town\"    Pt identified the following personal strengths to support her recovery: \"my bassam\"      Assessment: Patient would benefit from continue " engagement in OT groups that support healthy recovery, specifically exploration of positive coping skills for symptom management/relapse prevention.    Plan: Initiate care plan goals and interventions.      Plan for Next Treatment: Provide graded occupation-based activities for increased success and ongoing assessment.     Candido Orr, OT on 11/29/2021 at 2:23 PM     11/29/21 1400   General Information   Date Initially Attended OT 11/27/21   Clinical Impression   Affect Appropriate to situation;Excessive, manic;Needs further assessment   Orientation Oriented to person;Oriented to place;Needs further assessment   Appearance and ADLs General cleanliness observed in most areas   Attention to Internal Stimuli Appears distracted/preoccupied internally;Needs further assessment   Interaction Skills Initiates appropriately with staff;Initiates appropriately with peers   Ability to Communicate Needs Independent   Verbal Content Tangential;Hyperverbal;Pressured   Ability to Maintain Boundaries Maintains appropriate physical boundaries;Accepts and maintains boundaries with one cue   Participation Initiates participation   Concentration Concentrates 10-20 minutes;Needs further assessment   Ability to Concentrate With structure;Easily distracted   Follows and Comprehends Directions Independently follows 1 step verbal directions   Memory Needs further assessment   Organization Independently organizes simple tasks   Decision Making Changes mind frequently;Needs further assessment   Planning and Problem Solving Needs further assessment   Ability to Apply and Learn Concepts Applies within group structure   Frustrations / Stress Tolerance Utilizes coping skills with prompts   Level of Insight Some insight   Self Esteem Takes risks with support and encouragement   Social Supports Needs further assessment

## 2021-11-30 VITALS
DIASTOLIC BLOOD PRESSURE: 65 MMHG | HEIGHT: 72 IN | RESPIRATION RATE: 17 BRPM | WEIGHT: 244 LBS | BODY MASS INDEX: 33.05 KG/M2 | OXYGEN SATURATION: 96 % | HEART RATE: 73 BPM | TEMPERATURE: 97.8 F | SYSTOLIC BLOOD PRESSURE: 125 MMHG

## 2021-11-30 LAB
ANION GAP SERPL CALCULATED.3IONS-SCNC: 3 MMOL/L (ref 3–14)
BASOPHILS # BLD MANUAL: 0 10E3/UL (ref 0–0.2)
BASOPHILS NFR BLD MANUAL: 1 %
BUN SERPL-MCNC: 13 MG/DL (ref 7–30)
CALCIUM SERPL-MCNC: 9 MG/DL (ref 8.5–10.1)
CHLORIDE BLD-SCNC: 109 MMOL/L (ref 94–109)
CO2 SERPL-SCNC: 30 MMOL/L (ref 20–32)
CREAT SERPL-MCNC: 0.72 MG/DL (ref 0.52–1.04)
EOSINOPHIL # BLD MANUAL: 0.1 10E3/UL (ref 0–0.7)
EOSINOPHIL NFR BLD MANUAL: 4 %
ERYTHROCYTE [DISTWIDTH] IN BLOOD BY AUTOMATED COUNT: 15.6 % (ref 10–15)
GFR SERPL CREATININE-BSD FRML MDRD: >90 ML/MIN/1.73M2
GLUCOSE BLD-MCNC: 107 MG/DL (ref 70–99)
HCT VFR BLD AUTO: 32 % (ref 35–47)
HGB BLD-MCNC: 10.2 G/DL (ref 11.7–15.7)
LYMPHOCYTES # BLD MANUAL: 0.8 10E3/UL (ref 0.8–5.3)
LYMPHOCYTES NFR BLD MANUAL: 32 %
MCH RBC QN AUTO: 30.4 PG (ref 26.5–33)
MCHC RBC AUTO-ENTMCNC: 31.9 G/DL (ref 31.5–36.5)
MCV RBC AUTO: 95 FL (ref 78–100)
MONOCYTES # BLD MANUAL: 0.2 10E3/UL (ref 0–1.3)
MONOCYTES NFR BLD MANUAL: 8 %
NEUTROPHILS # BLD MANUAL: 1.3 10E3/UL (ref 1.6–8.3)
NEUTROPHILS NFR BLD MANUAL: 55 %
PLAT MORPH BLD: ABNORMAL
PLATELET # BLD AUTO: 187 10E3/UL (ref 150–450)
POTASSIUM BLD-SCNC: 4.4 MMOL/L (ref 3.4–5.3)
RBC # BLD AUTO: 3.36 10E6/UL (ref 3.8–5.2)
RBC MORPH BLD: ABNORMAL
SODIUM SERPL-SCNC: 142 MMOL/L (ref 133–144)
WBC # BLD AUTO: 2.4 10E3/UL (ref 4–11)

## 2021-11-30 PROCEDURE — 36415 COLL VENOUS BLD VENIPUNCTURE: CPT | Performed by: PHYSICIAN ASSISTANT

## 2021-11-30 PROCEDURE — 250N000013 HC RX MED GY IP 250 OP 250 PS 637: Performed by: PSYCHIATRY & NEUROLOGY

## 2021-11-30 PROCEDURE — G0177 OPPS/PHP; TRAIN & EDUC SERV: HCPCS

## 2021-11-30 PROCEDURE — 80048 BASIC METABOLIC PNL TOTAL CA: CPT | Performed by: PHYSICIAN ASSISTANT

## 2021-11-30 PROCEDURE — 250N000013 HC RX MED GY IP 250 OP 250 PS 637: Performed by: EMERGENCY MEDICINE

## 2021-11-30 PROCEDURE — 85027 COMPLETE CBC AUTOMATED: CPT | Performed by: PHYSICIAN ASSISTANT

## 2021-11-30 PROCEDURE — 99239 HOSP IP/OBS DSCHRG MGMT >30: CPT | Performed by: NURSE PRACTITIONER

## 2021-11-30 PROCEDURE — 250N000013 HC RX MED GY IP 250 OP 250 PS 637: Performed by: NURSE PRACTITIONER

## 2021-11-30 RX ORDER — SERTRALINE HYDROCHLORIDE 100 MG/1
100 TABLET, FILM COATED ORAL DAILY
Qty: 30 TABLET | Refills: 0 | Status: SHIPPED | OUTPATIENT
Start: 2021-12-01 | End: 2022-01-20

## 2021-11-30 RX ORDER — OLANZAPINE 7.5 MG/1
7.5 TABLET, FILM COATED ORAL AT BEDTIME
Qty: 30 TABLET | Refills: 0 | Status: SHIPPED | OUTPATIENT
Start: 2021-11-30 | End: 2022-01-03

## 2021-11-30 RX ORDER — BENZTROPINE MESYLATE 0.5 MG/1
0.5 TABLET ORAL 3 TIMES DAILY PRN
Qty: 60 TABLET | Refills: 0 | Status: SHIPPED | OUTPATIENT
Start: 2021-11-30 | End: 2022-07-06

## 2021-11-30 RX ADMIN — SERTRALINE HYDROCHLORIDE 100 MG: 100 TABLET ORAL at 08:53

## 2021-11-30 RX ADMIN — GABAPENTIN 600 MG: 300 CAPSULE ORAL at 08:53

## 2021-11-30 RX ADMIN — GABAPENTIN 600 MG: 300 CAPSULE ORAL at 13:57

## 2021-11-30 RX ADMIN — DICLOFENAC SODIUM 2 G: 10 GEL TOPICAL at 12:24

## 2021-11-30 RX ADMIN — OXYCODONE HYDROCHLORIDE 10 MG: 5 TABLET ORAL at 09:10

## 2021-11-30 RX ADMIN — Medication 25 MCG: at 08:53

## 2021-11-30 RX ADMIN — IBUPROFEN 800 MG: 400 TABLET ORAL at 12:26

## 2021-11-30 RX ADMIN — TOLNAFTATE: 1 CREAM TOPICAL at 12:25

## 2021-11-30 RX ADMIN — PANTOPRAZOLE SODIUM 40 MG: 40 TABLET, DELAYED RELEASE ORAL at 08:53

## 2021-11-30 RX ADMIN — ANASTROZOLE 1 MG: 1 TABLET ORAL at 08:53

## 2021-11-30 RX ADMIN — MORPHINE SULFATE 15 MG: 15 TABLET, FILM COATED, EXTENDED RELEASE ORAL at 08:53

## 2021-11-30 RX ADMIN — NICOTINE 1 PATCH: 21 PATCH, EXTENDED RELEASE TRANSDERMAL at 08:53

## 2021-11-30 ASSESSMENT — ACTIVITIES OF DAILY LIVING (ADL)
ORAL_HYGIENE: INDEPENDENT
DRESS: INDEPENDENT
LAUNDRY: WITH SUPERVISION
HYGIENE/GROOMING: INDEPENDENT

## 2021-11-30 ASSESSMENT — MIFFLIN-ST. JEOR: SCORE: 1858.78

## 2021-11-30 NOTE — PLAN OF CARE
Discharge order entered YES    Patient discharging 11/30/2021 accompanied by cousin and destination is home. Discharge paperwork and medications reviewed with Patient who verbalizes understanding.     Patient denies current or recent suicidal ideation    SIB denies    HI: denies current or recent homicidal ideation or behaviors.    Copies provided: AVS YES    Med Rec YES   Meds  YES   Security  Not applicable   Locker YES   Refrigerator Not applicable   SAFE Not applicable   BIN YES    SUICIDE ASSESS (NURSING NAVIGATOR)  YES    DISCHARGE FLOW SHEET:YES    CARE PLAN COMPLETE: YES    EDUCATION COMPLETE: YES    Patient completed Personal Plan of Care, identifying reasons for hospitalization and goals for discharge.     Survey provided.

## 2021-11-30 NOTE — PLAN OF CARE
Attended 2 OT Creative Expression Groups x 45 minutes each. Initiated groups and task. Good recall of tasks and desire to complete. With encouragement was attentive to improving details. Social entire sessions. Spoke freely of her cancer dx, tx and struggles. Noted her journey through the grief process. Relies on her spirituality as a guide for her. Brightened when speaking of children and grandchildren. Did note her need to focus on her health and well being  To be able to handle her physical struggles. Offered support and advise to peer that was speaking of her own struggles. Futuristic and goal directed in conversations, plans and goals.

## 2021-11-30 NOTE — PLAN OF CARE
Problem: Depressive Symptoms  Goal: Social and Therapeutic (Depression)  Description: Signs and symptoms of listed problems will be absent or manageable.  Outcome: Improving   Patient exhibited consistently bright affect throughout the evening.  She still rated depression 7 out of 10 but denied SI, and stated auditory hallucinations consist of unintelligible words which she tries to ignore.  She is looking forward to discharge tomorrow and feels ready.  Patient was given PRN Senna as she reported she has not had a bowel movement since admission.  She had no results so will request Miralax in the morning.  Patient also was given PRN roxicodone and robaxin for bone pain, as well as a hot pack.  /57 (BP Location: Left arm, Patient Position: Right side)   Pulse 69   Temp 98.2  F (36.8  C) (Oral)   Resp 16   Ht 1.829 m (6')   Wt 111.1 kg (245 lb)   LMP 11/26/2020   SpO2 95%   Breastfeeding No   BMI 33.23 kg/m

## 2021-11-30 NOTE — DISCHARGE INSTRUCTIONS
Behavioral Discharge Planning and Instructions    Summary: You were admitted on 11/26/2021  due to Depression and Suicidal Ideations.  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 Tyson     Address: Address: [facility] clinic  [address] [place] [address]  Phone: [phone]        Therapy Appointment Date/Time: Tuesday 12/14 at 9:30 (this is a video appointment; they will email directions to this appointment; you will need to download and install microsoft teams; a link will be included in their email)    Therapist: Erwin Abbott    Address: [place] [address]  Phone Number:  [phone]  Fax: [phone] (attn Erwin)    Cancer support resources:  Many in-person support groups have temporarily paused due to the risk of covid for individuals with cancer.   However, there are some on-line options:    [employer] in [place] IS having zoom meetings. To join you must first go to a new member meeting. You can get that information either through their website or by calling ([phone]) Call or go to the website: [email or web address]  These are not always specific to the type of cancer.     The American Cancer Society: 24 hour help and support line: 1-211.806.1495  They can also link you with a volunteer who can  "provide support. [place] for this at https://reach.cancer.org  They also have member on-line chat groups that will provide support and information    [employer] has mentoring available: https://www.samuel.org. This is specific to type of cancer.    Attend all scheduled appointments with your outpatient providers. Call at least 24 hours in advance if you need to reschedule an appointment to ensure continued access to your outpatient providers.     Major Treatments, Procedures and Findings:  You were provided with: a psychiatric assessment, assessed for medical stability, medication evaluation and/or management, group therapy and milieu management    Symptoms to Report: feeling more aggressive, increased confusion, losing more sleep, mood getting worse or thoughts of suicide    Early warning signs can include: increased depression or anxiety sleep disturbances increased thoughts or behaviors of suicide or self-harm  increased unusual thinking, such as paranoia or hearing voices    Safety and Wellness:  Take all medicines as directed.  Make no changes unless your doctor suggests them.      Follow treatment recommendations.  Refrain from alcohol and non-prescribed drugs.  If there is a concern for safety, call [phone].    Resources:   COPE:654.506.4216  Serenity Brandt: 582.491.2841     National [place] on Mental Illness (www.mn.cruz.org): 439.945.7212 or 881-045-2224.  Suicide Awareness Voices of Education (SAVE) (www.save.org): 524-888-KULJ (4376)  National Suicide Prevention Line (www.mentalhealthmn.org): 459-410-JMBT (6971)  Mental Health Consumer/Survivor Network of [place] (www.mhcsn.net): 225.692.2008 or 171-136-7220  Mental Health Association of [place] (www.mentalhealth.org): 333.687.8937 or 224-726-6748  Self- Management and Recovery Training., SMART-- Toll free: 554.818.1009  www.[employer]  Text 4 Life: txt \"LIFE\" to 94586 for immediate support and crisis intervention  Crisis text line: Text " "\"[place]\" to 695442. Free, confidential, 24/7.    General Medication Instructions:   See your medication sheet(s) for instructions.   Take all medicines as directed.  Make no changes unless your doctor suggests them.   Go to all your doctor visits.  Be sure to have all your required lab tests. This way, your medicines can be refilled on time.  Do not use any drugs not prescribed by your doctor.  Avoid alcohol.    Advance Directives:   Scanned document on file with [facility]? No scanned doc  Is document scanned? Pt states no documents  Honoring Choices Your Rights Handout: Informed and given  Was more information offered? Pt declined    The Treatment team has appreciated the opportunity to work with you. If you have any questions or concerns about your recent admission, you can contact the unit which can receive your call 24 hours a day, 7 days a week. They will be able to get in touch with a Provider if needed. The unit number is [phone].        "

## 2021-11-30 NOTE — PLAN OF CARE
Patient slept approximately 6.5 hours during the overnight shift; appeared comfortable with even and unlabored breathing while asleep. VS assessment at 0100 WDL. As with the previous night, the patient requested to not be woken up again for vitals assessment during the overnight shift due to having difficulties falling back asleep at times. Patient currently in bed, appears to be comfortably asleep. No additional issues or concerns reported or observed. Nursing will continue to monitor.

## 2021-11-30 NOTE — PLAN OF CARE
Pt attended the structured Therapeutic Recreation group, participating in a group activity. Pt participated in group discussion, leisure participation, and social engagement to gain self-esteem, manage behaviors, improve social skills, decrease isolation, and reduce anxiety/depression.   Pt remained focused and engaged throughout group activity.  Pt was sociable and was appropriate with interactions with this writer in group. Pt was an active participant, contributing to the clues and descriptions throughout the activity.

## 2021-11-30 NOTE — DISCHARGE SUMMARY
"Psychiatric Discharge Summary    Teresa Sanderson MRN# [id number]   Age: 46 year old YOB: 1975     Date of Admission:  11/26/2021  Date of Discharge:  11/30/2021  Admitting Physician:  Aura Dias MD  Discharge Physician:  RENO Gonzales CNP (Contact: [phone])         Event Leading to Hospitalization:      Teresa (pronounced \"clee-Maricruz\") Kin is a 46-year-old female admitted to [address] on 11/26/2021.  She was admitted as a voluntary patient through the ER due to auditory hallucinations and depressive symptoms with passive suicidal ideation.  Stressors include metastatic breast cancer with bone pain and two of her children not visiting for [facility].      From H&P 11/27/2021:    CHIEF COMPLAINT AND REASON FOR ADMISSION:  The patient is a 46-year-old female who reports that she has been having ups and downs for the past 3 months, recently feeling very down with thoughts of hopelessness, passive thoughts of suicide.  She was brought in to this hospital by her daughter and agreed to stay in the hospital voluntarily.     HISTORY OF PRESENT ILLNESS:  The patient presents as a reasonably reliable historian.  She stated that she is from [place], [place] and moved with 3 of her children from [place] to [place] 1-1/2 years ago.  Said that she has family here and currently staying with cousin, however, does not like his housing very well because cousin makes parties and people drink.  Reports that she had symptoms indicating cancer, however, was not diagnosed in [place], [place].  Said that she was diagnosed after she moved to [facility] with breast cancer and recent followed up to [facility] for doing that.  Said that she is getting help from an oncologist.  She sees a psychiatrist here, Dr. Tyson, but not therapist.  She reports auditory hallucinations.  States that \"people who I don't know laughing at me.\"  She denies command " hallucinations.  Reports using THC a couple of times per month and alcohol 1 or 2 glasses once a month also.  Reports significant irritability, isolation.  Reports that voices have been happening for a couple of years and have increased in the past few months.  Also reported that she is having a hard time making decisions and often confused what year and date is.     See full admission note by Dr. Manning 11/27/2021 for details.           Diagnoses:     Unspecified psychosis (differentials include schizoaffective disorder, bipolar disorder, major depressive disorder with psychosis and bipolar disorder)  Left-sided breast cancer with metastasis to bone  Bone pain related to cancer  History of DVT  History of pulmonary emboli         Labs:      Ref. Range 11/26/2021 16:26 11/27/2021 07:52 11/27/2021 11:35 11/27/2021 11:37   Cholesterol Latest Ref Range: <200 mg/dL  132     HCG Qual Urine Latest Ref Range: Negative    Negative    HDL Cholesterol Latest Ref Range: >=50 mg/dL  56     LDL Cholesterol Calculated Latest Ref Range: <=100 mg/dL  64     Non HDL Cholesterol Latest Ref Range: <130 mg/dL  76     Triglycerides Latest Ref Range: <150 mg/dL  59     TSH Latest Ref Range: 0.40 - 4.00 mU/L  1.17     SARS CoV2 PCR Latest Ref Range: Negative, Testing sent to reference lab. Results will be returned via unsolicited result  Negative      Amphetamine Qual Urine Latest Ref Range: Screen Negative     Screen Negative   Cocaine Qual Urine Latest Ref Range: Screen Negative     Screen Negative   Benzodiazepine Qual Ur Latest Ref Range: Screen Negative     Screen Negative   Opiates Qualitative Urine Latest Ref Range: Screen Negative     Screen Positive (A)   Cannabinoids Qual Urine Latest Ref Range: Screen Negative     Screen Negative   Barbiturates Qual Urine Latest Ref Range: Screen Negative     Screen Negative   Ethanol Qual Urine Latest Ref Range: Screen Negative     Screen Negative            Consults:     Brief Medicine  Note 11/27/2021:     Contacted by RN while on unit that patient with new bilateral lower extremity swelling last 1-2 days.  Per d/w patient, appears much better today.  Denies pain, numbness, and tingling.  Appears to have very trace non pitting edema bilaterally today.  Pt has a history of DVTs and PEs, but has been on Xarelto since 2014 with good compliance.  Medications reviewed, but gabapentin may be contributory (2-8% incidence per Up to Date, but pt has been on this medication long term) and arimidex (5-10% incidience).  I do not recommend any medication changes at this time.  Please call IM if swelling worsens or if new chest pain or shortness of breath.          Hospital Course:     Teresa Sanderson was admitted to [facility] with attending Aura Dias MD, under the direct care of Aisha Kee NP as a voluntary patient.  The patient was placed under status 15 (15 minute checks) to ensure patient safety.     Zoloft 100 mg daily was continued.  Zyprexa was increased from 5 mg to 7.5 mg at HS.  She complained of muscle twitching/jerking in her hands, causing her to drop objects.  PRN Cogentin 0.5 mg was initiated and she reported it was beneficial.    Teresa Sanderson did participate in groups and was visible in the milieu.  Behavior was calm and cooperative.  She appeared motivated for treatment and recovery.  She stated that being on the unit in the presence of staff and patients was very beneficial for her.    The patient's symptoms of depression and psychosis improved.   She reported her mood was improved.  She denied suicidal ideation.  She was hopeful and future-oriented.  Concern for hypomanic symptoms was noted midway through her hospitalization as evidenced by distractibility, increased intensity of affect, rapid shift in her mood, and her being slightly hyperverbal, but these abated by the time she was discharged.  She reported auditory hallucinations were greatly reduced in volume and  frequency and no longer troublesome.  She denied all other psychotic symptoms.  She ate and slept well.     Teresa Sanderson was released to home.  At the time of discharge Teresa Sanderson was determined to not be a danger to herself or others.          Discharge Medications:        START taking      Dose / Directions   benztropine 0.5 MG tablet  Commonly known as: COGENTIN      Dose: 0.5 mg  Take 1 tablet (0.5 mg) by mouth 3 times daily as needed (tremor, jerking movements)  Quantity: 60 tablet  Refills: 0     sertraline 100 MG tablet  Commonly known as: ZOLOFT      Dose: 100 mg  Start taking on: December 1, 2021  Take 1 tablet (100 mg) by mouth daily  Quantity: 30 tablet  Refills: 0        CONTINUE these medicines which may have CHANGED, or have new prescriptions. If we are uncertain of the size of tablets/capsules you have at home, strength may be listed as something that might have changed.      Dose / Directions   OLANZapine 7.5 MG tablet  Commonly known as: zyPREXA  This may have changed:     medication strength    how much to take    how to take this    when to take this    additional instructions      Dose: 7.5 mg  Take 1 tablet (7.5 mg) by mouth At Bedtime  Quantity: 30 tablet  Refills: 0        CONTINUE these medicines which have NOT CHANGED      Dose / Directions   anastrozole 1 MG tablet  Commonly known as: ARIMIDEX  Used for: Carcinoma of left breast metastatic to bone (H), Malignant neoplasm of overlapping sites of left breast in female, estrogen receptor positive (H)      Dose: 1 mg  Take 1 tablet (1 mg) by mouth daily  Quantity: 90 tablet  Refills: 2     diclofenac 1 % topical gel  Commonly known as: VOLTAREN  Used for: Hip pain, right      Dose: 2 g  Apply 2 g topically 4 times daily  Quantity: 350 g  Refills: 3     gabapentin 300 MG capsule  Commonly known as: NEURONTIN  Used for: Pain of right lower leg, Metastasis to bone (H), Malignant neoplasm of overlapping sites of left breast in female,  estrogen receptor positive (H)      Take 2 capsules (600 mg) by mouth every morning AND 2 capsules (600 mg) daily at 2 pm AND 2 capsules (600 mg) At Bedtime.  Quantity: 180 capsule  Refills: 0     ibuprofen 800 MG tablet  Commonly known as: ADVIL/MOTRIN      Dose: 800 mg  Take 800 mg by mouth every 6 hours as needed for moderate pain  Refills: 0     methocarbamol 500 MG tablet  Commonly known as: ROBAXIN      Dose: 500 mg  Take 500 mg by mouth 4 times daily as needed for muscle spasms  Refills: 0     morphine 15 MG CR tablet  Commonly known as: MS Contin  Used for: Cancer associated pain      Dose: 15 mg  Take 1 tablet (15 mg) by mouth every 12 hours  Quantity: 60 tablet  Refills: 0     naloxone 4 MG/0.1ML nasal spray  Commonly known as: NARCAN  Used for: Spine metastasis (H)      Dose: 4 mg  Spray 1 spray (4 mg) into one nostril alternating nostrils once as needed for opioid reversal every 2-3 minutes until assistance arrives  Quantity: 0.2 mL  Refills: 0     nicotine 2 MG gum  Commonly known as: NICORETTE  Used for: Encounter for smoking cessation counseling      Dose: 2 mg  Place 1 each (2 mg) inside cheek every hour as needed for smoking cessation  Quantity: 90 each  Refills: 1     ondansetron 4 MG ODT tab  Commonly known as: ZOFRAN-ODT  Used for: Spine metastasis (H)      Dose: 4 mg  Take 1 tablet (4 mg) by mouth every 6 hours as needed for nausea or vomiting  Quantity: 120 tablet  Refills: 0     oxyCODONE 5 MG tablet  Commonly known as: ROXICODONE  Used for: Cancer associated pain      Dose: 5-10 mg  Take 1-2 tablets (5-10 mg) by mouth every 6 hours as needed for moderate to severe pain  Quantity: 90 tablet  Refills: 0     palbociclib 125 MG tablet  Commonly known as: IBRANCE  Used for: Carcinoma of left breast metastatic to bone (H), Malignant neoplasm of overlapping sites of left breast in female, estrogen receptor positive (H)      Dose: 125 mg  Take 1 tablet (125 mg) by mouth daily Take for 21 days, then 7  "days off.  Quantity: 21 tablet  Refills: 2     pantoprazole 40 MG EC tablet  Commonly known as: PROTONIX  Used for: Gastroesophageal reflux disease without esophagitis      Dose: 40 mg  Take 1 tablet (40 mg) by mouth every morning (before breakfast)  Quantity: 30 tablet  Refills: 1     polyethylene glycol 17 GM/Dose powder  Commonly known as: MIRALAX  Used for: Therapeutic opioid induced constipation      Dose: 17 g  Take 17 g by mouth daily as needed for constipation  Quantity: 578 g  Refills: 3     prochlorperazine 10 MG tablet  Commonly known as: COMPAZINE  Used for: Carcinoma of left breast metastatic to bone (H), Malignant neoplasm of overlapping sites of left breast in female, estrogen receptor positive (H)      Dose: 10 mg  Take 1 tablet (10 mg) by mouth every 6 hours as needed (Nausea/Vomiting)  Quantity: 30 tablet  Refills: 2     rivaroxaban ANTICOAGULANT 20 MG Tabs tablet  Commonly known as: XARELTO  Used for: Acute deep vein thrombosis (DVT) of popliteal vein of left lower extremity (H)      Dose: 20 mg  Take 1 tablet (20 mg) by mouth daily (with dinner)  Quantity: 30 tablet  Refills: 11     SENNA-docusate sodium 8.6-50 MG tablet  Commonly known as: SENNA S  Used for: Therapeutic opioid induced constipation      Dose: 2 tablet  Take 2 tablets by mouth 2 times daily as needed (Constipation)  Quantity: 100 tablet  Refills: 3     tolnaftate 1 % external cream  Commonly known as: TINACTIN  Used for: Tinea pedis of both feet      Apply topically 2 times daily  Quantity: 30 g  Refills: 1     Vitamin D3 25 mcg (1000 units) tablet  Commonly known as: CHOLECALCIFEROL      Dose: 25 mcg  Take 25 mcg by mouth daily  Refills: 0               Psychiatric Examination:     Appearance:  awake, alert, adequately groomed and dressed in hospital scrubs  Attitude:  cooperative  Eye Contact:  good  Mood:  \"better\"  Affect:  bright, full range  Speech:  clear, coherent  Psychomotor Behavior:  no evidence of tardive dyskinesia, " dystonia, or tics  Thought Process:  linear and goal oriented  Associations:  no loose associations  Thought Content:  no evidence of suicidal ideation or homicidal ideation, report auditory hallucinations are reduced in frequency and intensity and no longer troublesome, denies all other psychotic symptoms  Insight:  fair  Judgment:  fair  Oriented to:  date, time, person, and place  Attention Span and Concentration:  fair  Recent and Remote Memory:  fair  Language:  intact, fluent English  Fund of Knowledge:  appropriate  Muscle Strength and Tone:  normal  Gait and Station:  normal    /57 (BP Location: Left arm, Patient Position: Right side)   Pulse 69   Temp 98.2  F (36.8  C) (Oral)   Resp 16   Ht 1.829 m (6')   Wt 111.1 kg (245 lb)   LMP 11/26/2020   SpO2 95%   Breastfeeding No   BMI 33.23 kg/m           Discharge Plan:     Per Discharge AVS:    Summary: You were admitted on 11/26/2021 due to depression and suicidal ideations.  You were treated by Aisha Kee NP and discharged on 11/30/2021 from [facility] to home.    Health Care Follow-up:   Psychiatry Appointment Date/Time: Wednesday 12/8 at 11 am (30 minutes) and Tuesday 1/25/2021 at 2:30; these are video appointments (call them if you'd prefer in-person)     Psychiatrist: Dr. Tyson     Address: Address: [facility] psychiatry clinic  [address] [place] [address]  Phone: [phone]        Therapy Appointment Date/Time: Tuesday 12/14 at 9:30 (this is a video appointment; they will email directions to this appointment; you will need to download and install microsoft teams; a link will be included in their email)    Therapist: Erwin Abbott    Address: [address] [address]  Phone Number:  [phone]  Fax: [phone] (attn Erwin)    Cancer support resources:  Many in-person support groups have temporarily paused due to the risk of covid for  individuals with cancer.   However, there are some on-line options:    [employer] in [place] IS having zoom meetings. To join you must first go to a new member meeting. You can get that information either through their website or by calling ([phone]) Call or go to the website: [email or web address]  These are not always specific to the type of cancer.     The American Cancer Society: 24 hour help and support line: 1-412.571.2271  They can also link you with a volunteer who can provide support. [place] for this at https://reach.cancer.org  They also have member on-line chat groups that will provide support and information      She was provided with a 30-day supply of Zyprexa, Zoloft and PRN Cogentin.   She was advised to take her medications as prescribed and to abstain from alcohol and illicit substances.        Attestation:  The patient has been seen and evaluated by me, RENO Gonzales CNP   Discharge time > 30 minutes

## 2021-12-03 ENCOUNTER — HOSPITAL ENCOUNTER (OUTPATIENT)
Dept: PET IMAGING | Facility: CLINIC | Age: 46
Discharge: HOME | End: 2021-12-03
Attending: INTERNAL MEDICINE | Admitting: INTERNAL MEDICINE
Payer: COMMERCIAL

## 2021-12-03 DIAGNOSIS — C50.912 CARCINOMA OF LEFT BREAST METASTATIC TO BONE (H): ICD-10-CM

## 2021-12-03 DIAGNOSIS — C50.812 MALIGNANT NEOPLASM OF OVERLAPPING SITES OF LEFT BREAST IN FEMALE, ESTROGEN RECEPTOR POSITIVE (H): ICD-10-CM

## 2021-12-03 DIAGNOSIS — C79.51 CARCINOMA OF LEFT BREAST METASTATIC TO BONE (H): ICD-10-CM

## 2021-12-03 DIAGNOSIS — Z17.0 MALIGNANT NEOPLASM OF OVERLAPPING SITES OF LEFT BREAST IN FEMALE, ESTROGEN RECEPTOR POSITIVE (H): ICD-10-CM

## 2021-12-03 PROCEDURE — 71260 CT THORAX DX C+: CPT | Mod: 26 | Performed by: STUDENT IN AN ORGANIZED HEALTH CARE EDUCATION/TRAINING PROGRAM

## 2021-12-03 PROCEDURE — 71250 CT THORAX DX C-: CPT | Mod: 59,PS

## 2021-12-03 PROCEDURE — 343N000001 HC RX 343: Performed by: INTERNAL MEDICINE

## 2021-12-03 PROCEDURE — 78816 PET IMAGE W/CT FULL BODY: CPT | Mod: 26 | Performed by: STUDENT IN AN ORGANIZED HEALTH CARE EDUCATION/TRAINING PROGRAM

## 2021-12-03 PROCEDURE — A9552 F18 FDG: HCPCS | Performed by: INTERNAL MEDICINE

## 2021-12-03 PROCEDURE — 74177 CT ABD & PELVIS W/CONTRAST: CPT | Mod: 26 | Performed by: STUDENT IN AN ORGANIZED HEALTH CARE EDUCATION/TRAINING PROGRAM

## 2021-12-03 RX ORDER — IOPAMIDOL 755 MG/ML
20-135 INJECTION, SOLUTION INTRAVASCULAR ONCE
Status: DISCONTINUED | OUTPATIENT
Start: 2021-12-03 | End: 2021-12-03 | Stop reason: CLARIF

## 2021-12-03 RX ADMIN — FLUDEOXYGLUCOSE F-18 14.61 MCI.: 500 INJECTION, SOLUTION INTRAVENOUS at 10:49

## 2021-12-08 ENCOUNTER — TELEPHONE (OUTPATIENT)
Dept: PSYCHIATRY | Facility: CLINIC | Age: 46
End: 2021-12-08
Payer: COMMERCIAL

## 2021-12-08 NOTE — TELEPHONE ENCOUNTER
On December 8, 2021, at 10:35 AM, writer called patient at mobile to confirm Virtual Visit. Writer unable to make contact with patient. Line was not in service. Writer called pt home phone and was unable to reach pt. Voicemail greeting was someone other than the patient so writer did not leave vm. [employer] link was sent to preferred e-mail: [email or web address]. Leny Oneill, EMT    Writer called patient back at 10:53 AM at mobile to confirm virtual visit. Writer was still unable to reach patient. Call went through this time but voicemail box was full. belia Oneill, EMT

## 2021-12-10 ENCOUNTER — TELEPHONE (OUTPATIENT)
Dept: PSYCHIATRY | Facility: CLINIC | Age: 46
End: 2021-12-10
Payer: COMMERCIAL

## 2021-12-10 NOTE — TELEPHONE ENCOUNTER
"Writer placed outgoing phone call to pt to follow-up re: recent discharge from hospital and missed appointment with Dr. Tyson this week. Pt reports the following:    --Pt reports she is doing \"okay.\"  --Reviewed psychiatric medications and changes made during recent hospitalization. Pt reports good compliance. Is taking olanzapine 7.5mg at HS, which was increased during hospital stay. Continues with sertraline 100mg every day.   --Pt tolerating olanzapine without SE. Did experience a hand tremor during hospitalization and one time since discharge. Has PRN cogentin available but is not using. Tremor has seemingly resolved.   --Pt experiencing frequent changes in mood, motivation, and energy level throughout the day. For example, one moment she will be doing the dishes, and the next moment she will be crying/sad/down. She reports high anxiety due to waiting on results from PET scan this week.   --Pt's main concern is her sleep schedule. She is sleeping around 7-8 hours per night, but it takes her hours to fall asleep. She spends a lot of time in bed watching TV. For the last week pt has been falling asleep around 5a and sleeping until 3-4p.   --Denies auditory hallucinations since discharge from hospital. Denies current safety concerns.   --Support system: daughter, cousin, best friend.  --Pt was instructed to tell a friend/family member if safety concerns were to develop and report to the ED for any imminent safety concerns.     Will get message to Dr. Tyson and follow-up with pt next week regarding appointment.   "

## 2021-12-18 DIAGNOSIS — C50.912 CARCINOMA OF LEFT BREAST METASTATIC TO BONE (H): Primary | ICD-10-CM

## 2021-12-18 DIAGNOSIS — C50.812 MALIGNANT NEOPLASM OF OVERLAPPING SITES OF LEFT BREAST IN FEMALE, ESTROGEN RECEPTOR POSITIVE (H): ICD-10-CM

## 2021-12-18 DIAGNOSIS — C79.51 CARCINOMA OF LEFT BREAST METASTATIC TO BONE (H): Primary | ICD-10-CM

## 2021-12-18 DIAGNOSIS — Z17.0 MALIGNANT NEOPLASM OF OVERLAPPING SITES OF LEFT BREAST IN FEMALE, ESTROGEN RECEPTOR POSITIVE (H): ICD-10-CM

## 2021-12-23 DIAGNOSIS — C79.51 CARCINOMA OF LEFT BREAST METASTATIC TO BONE (H): Primary | ICD-10-CM

## 2021-12-23 DIAGNOSIS — C50.912 CARCINOMA OF LEFT BREAST METASTATIC TO BONE (H): Primary | ICD-10-CM

## 2021-12-23 DIAGNOSIS — Z17.0 MALIGNANT NEOPLASM OF OVERLAPPING SITES OF LEFT BREAST IN FEMALE, ESTROGEN RECEPTOR POSITIVE (H): ICD-10-CM

## 2021-12-23 DIAGNOSIS — C50.812 MALIGNANT NEOPLASM OF OVERLAPPING SITES OF LEFT BREAST IN FEMALE, ESTROGEN RECEPTOR POSITIVE (H): ICD-10-CM

## 2021-12-23 RX ORDER — ANASTROZOLE 1 MG/1
1 TABLET ORAL DAILY
Qty: 28 TABLET | Refills: 2 | Status: SHIPPED | OUTPATIENT
Start: 2021-12-23 | End: 2021-12-29

## 2021-12-28 ENCOUNTER — LAB (OUTPATIENT)
Dept: LAB | Facility: CLINIC | Age: 46
End: 2021-12-28
Attending: INTERNAL MEDICINE
Payer: COMMERCIAL

## 2021-12-28 DIAGNOSIS — C79.51 CARCINOMA OF LEFT BREAST METASTATIC TO BONE (H): ICD-10-CM

## 2021-12-28 DIAGNOSIS — C50.912 CARCINOMA OF LEFT BREAST METASTATIC TO BONE (H): ICD-10-CM

## 2021-12-28 DIAGNOSIS — Z17.0 MALIGNANT NEOPLASM OF OVERLAPPING SITES OF LEFT BREAST IN FEMALE, ESTROGEN RECEPTOR POSITIVE (H): ICD-10-CM

## 2021-12-28 DIAGNOSIS — C50.812 MALIGNANT NEOPLASM OF OVERLAPPING SITES OF LEFT BREAST IN FEMALE, ESTROGEN RECEPTOR POSITIVE (H): ICD-10-CM

## 2021-12-28 LAB
ALBUMIN SERPL-MCNC: 3.6 G/DL (ref 3.4–5)
ALP SERPL-CCNC: 123 U/L (ref 40–150)
ALT SERPL W P-5'-P-CCNC: 21 U/L (ref 0–50)
ANION GAP SERPL CALCULATED.3IONS-SCNC: 10 MMOL/L (ref 3–14)
AST SERPL W P-5'-P-CCNC: 12 U/L (ref 0–45)
BASOPHILS # BLD AUTO: 0.1 10E3/UL (ref 0–0.2)
BASOPHILS NFR BLD AUTO: 1 %
BILIRUB SERPL-MCNC: 0.4 MG/DL (ref 0.2–1.3)
BUN SERPL-MCNC: 13 MG/DL (ref 7–30)
CALCIUM SERPL-MCNC: 9.1 MG/DL (ref 8.5–10.1)
CANCER AG27-29 SERPL-ACNC: 59 U/ML (ref 0–39)
CEA SERPL-MCNC: 3.1 UG/L (ref 0–2.5)
CHLORIDE BLD-SCNC: 111 MMOL/L (ref 94–109)
CO2 SERPL-SCNC: 23 MMOL/L (ref 20–32)
CREAT SERPL-MCNC: 0.67 MG/DL (ref 0.52–1.04)
EOSINOPHIL # BLD AUTO: 0.1 10E3/UL (ref 0–0.7)
EOSINOPHIL NFR BLD AUTO: 2 %
ERYTHROCYTE [DISTWIDTH] IN BLOOD BY AUTOMATED COUNT: 15.1 % (ref 10–15)
GFR SERPL CREATININE-BSD FRML MDRD: >90 ML/MIN/1.73M2
GLUCOSE BLD-MCNC: 135 MG/DL (ref 70–99)
HCT VFR BLD AUTO: 35.2 % (ref 35–47)
HGB BLD-MCNC: 11.6 G/DL (ref 11.7–15.7)
IMM GRANULOCYTES # BLD: 0 10E3/UL
IMM GRANULOCYTES NFR BLD: 0 %
LYMPHOCYTES # BLD AUTO: 0.8 10E3/UL (ref 0.8–5.3)
LYMPHOCYTES NFR BLD AUTO: 22 %
MCH RBC QN AUTO: 30.8 PG (ref 26.5–33)
MCHC RBC AUTO-ENTMCNC: 33 G/DL (ref 31.5–36.5)
MCV RBC AUTO: 93 FL (ref 78–100)
MONOCYTES # BLD AUTO: 0.2 10E3/UL (ref 0–1.3)
MONOCYTES NFR BLD AUTO: 6 %
NEUTROPHILS # BLD AUTO: 2.6 10E3/UL (ref 1.6–8.3)
NEUTROPHILS NFR BLD AUTO: 69 %
NRBC # BLD AUTO: 0 10E3/UL
NRBC BLD AUTO-RTO: 0 /100
PLATELET # BLD AUTO: 218 10E3/UL (ref 150–450)
POTASSIUM BLD-SCNC: 3.4 MMOL/L (ref 3.4–5.3)
PROT SERPL-MCNC: 8 G/DL (ref 6.8–8.8)
RBC # BLD AUTO: 3.77 10E6/UL (ref 3.8–5.2)
SODIUM SERPL-SCNC: 144 MMOL/L (ref 133–144)
WBC # BLD AUTO: 3.9 10E3/UL (ref 4–11)

## 2021-12-28 PROCEDURE — 82378 CARCINOEMBRYONIC ANTIGEN: CPT

## 2021-12-28 PROCEDURE — 86300 IMMUNOASSAY TUMOR CA 15-3: CPT

## 2021-12-28 PROCEDURE — 36415 COLL VENOUS BLD VENIPUNCTURE: CPT

## 2021-12-28 PROCEDURE — 85025 COMPLETE CBC W/AUTO DIFF WBC: CPT

## 2021-12-28 PROCEDURE — 80053 COMPREHEN METABOLIC PANEL: CPT

## 2021-12-28 NOTE — NURSING NOTE
Chief Complaint   Patient presents with     Labs Only     venipuncture right arm     Candido Salcido RN on 12/28/2021 at 9:48 AM

## 2021-12-28 NOTE — PROGRESS NOTES
Oncology Visit:   Date on this visit: Dec 29, 2021    Diagnosis:  ER positive left breast cancer metastasized to bones.     Primary Physician: No Ref-Primary, Physician     History Of Present Illness:    Ms. Sanderson is a 46 year old female with a h/o tobacco abuse and DVTs with left breast cancer metastasized to bone. She presented to [place] ED with back pain on 12/5/2020. MRI of the L-spine showed an abnormal L4 lesion with associated right paraspinal mass, abnormalities in L5 and the left iliac bone were also seen. CT C/A/P showed a left breast mass, lytic lesions of T7, L4, and the pelvis, and a 3 cm lesion in the kidney (thought to be a cyst). Ultrasound of the bilateral lower extremities showed a non-occlusive thrombus in the left popliteal vein. Mammogram and ultrasound of the bilateral breasts on 12/17/2020 showed a spiculated mass measuring at least 7.8 cm at 12-1:00 left breast extending from the nipple to 9 cm from the nipple with associated nipple retraction. This mass was biopsied, and showed IDC with surrounding DCIS, grade 3, ER+ 90%, and OK+ 75%.  HER2 was equivocal in approximately 35% of tumor cells by FISH and was negative by IHC.    Metastatic Breast Cancer Treatment:  12/23/2021 - 1/7/2021  Radiation (3000 cGy) to the lumbar spine.  1/29/2021 - present  Ibrance, zoladex, and anastrozole.  5/17/2021 radiofrequency ablation, kyphoplasty to L4  8/20/2021  Bilateral salpingo-oophorectomies, Ibrance and anastrozole    Interval History:  Ms. Sanderson is seen today for metastatic breast cancer follow-up. She is very happy to hear about her PET scan. She has been stressed and anxious about this for the past few weeks. Generally her mood has been better since discharge from [place] a few weeks ago besides this. She has follow-up with Dr. Tyson tomorrow.     She has been out of anastrozole of a month otherwise has been compliant with meds. Currently on day 8 of Ibrance.     She had her second Pfizer  shot on Monday. Will receive the booster dose in about a month. No side effects from the vaccine.     She is continue to cut back on smoking. Using maybe 2-3 per day.     Still having a lot of low back pain. Pain is not severe all the time. Mostly when changing positions she will feel stiff. She has been out of narcotics for a few weeks but she has needed 800 mg of ibuprofen using several times per day with some relief.     She has still been relatively inactive. She did walk inside yesterday at the mall and was tired after about 30 minutes.     Appetite is lower but she is still able to eat and drink okay. No nausea or vomiting. Bowels are okay on senna and miralax.       Past Medical/Surgical History:   Past Medical History:   Diagnosis Date     Anxiety      Breast CA (H) 12/2020     Depression      DVT (deep venous thrombosis) (H) 2014     Left breast mass     x approximately 4-5 months     Pulmonary emboli (H)      Pyelonephritis      Schizoaffective disorder (H)      Tobacco use      Past Surgical History:   Procedure Laterality Date     IR LUMBAR KYPHOPLASTY VERTEBRAE  5/17/2021     LAPAROSCOPIC SALPINGO-OOPHORECTOMY Bilateral 8/20/2021    Procedure: BILATERAL SALPINGO-OOPHORECTOMY, LAPAROSCOPIC;  Surgeon: Rory Lopez MD;  Location: [facility] OR     TUBAL LIGATION  1998        Allergies   Allergen Reactions     Contrast Dye      Pt developed nausea after isovue 370 injection on 6/9/21        Current Outpatient Medications   Medication     anastrozole (ARIMIDEX) 1 MG tablet     benztropine (COGENTIN) 0.5 MG tablet     diclofenac (VOLTAREN) 1 % topical gel     gabapentin (NEURONTIN) 300 MG capsule     ibuprofen (ADVIL/MOTRIN) 800 MG tablet     methocarbamol (ROBAXIN) 500 MG tablet     morphine (MS CONTIN) 15 MG CR tablet     naloxone (NARCAN) 4 MG/0.1ML nasal spray     nicotine (NICORETTE) 2 MG gum     OLANZapine (ZYPREXA) 7.5 MG tablet     ondansetron (ZOFRAN-ODT) 4 MG ODT tab     oxyCODONE  (ROXICODONE) 5 MG tablet     palbociclib (IBRANCE) 125 MG tablet     pantoprazole (PROTONIX) 40 MG EC tablet     polyethylene glycol (MIRALAX) 17 GM/Dose powder     prochlorperazine (COMPAZINE) 10 MG tablet     rivaroxaban ANTICOAGULANT (XARELTO) 20 MG TABS tablet     SENNA-docusate sodium (SENNA S) 8.6-50 MG tablet     sertraline (ZOLOFT) 100 MG tablet     tolnaftate (TINACTIN) 1 % external cream     Vitamin D3 (CHOLECALCIFEROL) 25 mcg (1000 units) tablet     No current facility-administered medications for this visit.   '      Physical Exam:   There were no vitals taken for this visit.   Wt Readings from Last 4 Encounters:   11/30/21 110.7 kg (244 lb)   11/23/21 112.1 kg (247 lb 3.2 oz)   10/12/21 108.1 kg (238 lb 6.4 oz)   09/13/21 109.8 kg (242 lb)     Video physical exam  General: Patient appears well in no acute distress.   Skin: No visualized rash or lesions on visualized skin  Eyes: EOMI, no erythema, sclera icterus or discharge noted  Resp: Appears to be breathing comfortably without accessory muscle usage, speaking in full sentences, no cough  MSK: Appears to have normal range of motion based on visualized movements  Neurologic: No apparent tremors, facial movements symmetric  Psych: affect bright, alert and oriented    The rest of a comprehensive physical examination is deferred due to PHE (public health emergency) video restrictions        Laboratory/Imaging Studies    12/28/2021 09:43   Sodium 144   Potassium 3.4   Chloride 111 (H)   Carbon Dioxide 23   Urea Nitrogen 13   Creatinine 0.67   GFR Estimate >90   Calcium 9.1   Anion Gap 10   Albumin 3.6   Protein Total 8.0   Bilirubin Total 0.4   Alkaline Phosphatase 123   ALT 21   AST 12   CA 27-29 59 (H)   Glucose 135 (H)   WBC 3.9 (L)   Hemoglobin 11.6 (L)   Hematocrit 35.2   Platelet Count 218   RBC Count 3.77 (L)   MCV 93   MCH 30.8   MCHC 33.0   RDW 15.1 (H)   % Neutrophils 69   % Lymphocytes 22   % Monocytes 6   % Eosinophils 2   % Basophils 1    Absolute Basophils 0.1   Absolute Eosinophils 0.1   Absolute Immature Granulocytes 0.0   Absolute Lymphocytes 0.8   Absolute Monocytes 0.2   % Immature Granulocytes 0   Absolute Neutrophils 2.6   Absolute NRBCs 0.0   NRBCs per 100 WBC 0   CEA 3.1 (H)      9/13/2021 12:42 10/12/2021 09:40 11/23/2021 11:01 12/28/2021 09:43   CA 27-29 50 (H) 59 (H) 48 (H) 59 (H)   CEA 2.7 (H) 2.7 (H) 3.1 (H) 3.1 (H)       Imaging: PET/CT 12/3  IMPRESSION: In this patient with history of metastatic breast cancer:     1. New borderline avidity of a left axillary node that is unchanged in  size from 6/9/2021, suggesting a reactive node although developing  metastasis is on the differential diagnosis. Consider close imaging  follow-up.     2. Increased borderline avidity to the left breast adjacent to the  clip which is also unchanged in CT appearance, suggesting combination  of chronic posttreatment uptake and artifact from attenuation  correction. Also recommend attention on follow-up to this location.     3. Otherwise no suspicious hypermetabolism to indicate metastatic  disease.     4. Unchanged scattered nonavid mixed lytic and sclerotic osseous foci,  again suggesting treated metastatic disease.     5. Extrusion of cement into/along the inferior vena cava at the L4  vertebroplasty site similar to prior.      ASSESSMENT/PLAN:   46 year old female with history of DVT with left breast invasive ductal carcinoma, ER/GA positive, HER2 negative metastasized to bones.    1.  Metastatic breast cancer: Currently on treatment with ibrance and anastrozole. She overall tolerates well with some ?joint stiffness. She has been out of anastrozole for about a month. I sent a refill to [facility] and asked her to start this ASAP. I reviewed PET/CT from 12/3 showing MAGDI.     Will need to continue labs and monthly follow-up to stay on track and encourage compliance.     2.  Mood disorder, previously diagnosed with schizoaffective disorder, bipolar type:  Recent hospitalization to [place] where zyprexa dose was increased. Mood has been better apart from situational anxiety about scan. She is already feeling better. Sees Dr. Tyson tomorrow.     3.  Bone metastases/back pain:  She is s/p XRT to the lumbar spine and kyphoplasty of L4.  She is taking MS contin 15 mg PO BID and oxycodone prn, more recently ibuprofen. This is being managed by palliative. Encouraged her to get set up with PT. Voltaren gel is helping her hip pain.   -Refilled MS Contin and oxycodone today since next palliative appt is a month. With MAGDI, would hopefully be able to taper in the future.     She has been on Zometa since 1/18/2021 and is receiving once every 3 months. This is currently on hold due to dental work.     4.  DVT:  Recommend continuing Xarelto until contraindicated given metastatic disease.      5. Tobacco abuse: Encouraged her to continue to cut back.     6. Opioid induced constipation: Managed with miralax and senna.     40 minutes spent on the date of the encounter doing chart review, review of test results, interpretation of tests, patient visit and documentation     Alee Yang PA-C

## 2021-12-29 ENCOUNTER — TELEPHONE (OUTPATIENT)
Dept: ONCOLOGY | Facility: CLINIC | Age: 46
End: 2021-12-29

## 2021-12-29 ENCOUNTER — VIRTUAL VISIT (OUTPATIENT)
Dept: ONCOLOGY | Facility: CLINIC | Age: 46
End: 2021-12-29
Attending: PHYSICIAN ASSISTANT
Payer: COMMERCIAL

## 2021-12-29 DIAGNOSIS — C50.812 MALIGNANT NEOPLASM OF OVERLAPPING SITES OF LEFT BREAST IN FEMALE, ESTROGEN RECEPTOR POSITIVE (H): ICD-10-CM

## 2021-12-29 DIAGNOSIS — C50.912 CARCINOMA OF LEFT BREAST METASTATIC TO BONE (H): ICD-10-CM

## 2021-12-29 DIAGNOSIS — G89.3 CANCER ASSOCIATED PAIN: ICD-10-CM

## 2021-12-29 DIAGNOSIS — Z17.0 MALIGNANT NEOPLASM OF OVERLAPPING SITES OF LEFT BREAST IN FEMALE, ESTROGEN RECEPTOR POSITIVE (H): ICD-10-CM

## 2021-12-29 DIAGNOSIS — C79.51 CARCINOMA OF LEFT BREAST METASTATIC TO BONE (H): ICD-10-CM

## 2021-12-29 PROCEDURE — 999N001193 HC VIDEO/TELEPHONE VISIT; NO CHARGE

## 2021-12-29 PROCEDURE — 99215 OFFICE O/P EST HI 40 MIN: CPT | Mod: 95 | Performed by: PHYSICIAN ASSISTANT

## 2021-12-29 RX ORDER — MORPHINE SULFATE 15 MG/1
15 TABLET, FILM COATED, EXTENDED RELEASE ORAL EVERY 12 HOURS
Qty: 60 TABLET | Refills: 0 | Status: SHIPPED | OUTPATIENT
Start: 2021-12-29 | End: 2022-01-25

## 2021-12-29 RX ORDER — ANASTROZOLE 1 MG/1
1 TABLET ORAL DAILY
Qty: 90 TABLET | Refills: 3 | Status: SHIPPED | OUTPATIENT
Start: 2021-12-29 | End: 2022-05-13

## 2021-12-29 RX ORDER — OXYCODONE HYDROCHLORIDE 5 MG/1
5-10 TABLET ORAL EVERY 6 HOURS PRN
Qty: 90 TABLET | Refills: 0 | Status: SHIPPED | OUTPATIENT
Start: 2021-12-29 | End: 2022-01-25

## 2021-12-29 NOTE — LETTER
12/29/2021         RE: Teresa Sanderson  [address] [facility] [address]        Dear Colleague,    Thank you for referring your patient, Teresa Sanderson, to the [facility]. Please see a copy of my visit note below.    Oncology Visit:   Date on this visit: Dec 29, 2021    Diagnosis:  ER positive left breast cancer metastasized to bones.     Primary Physician: No Ref-Primary, Physician     History Of Present Illness:    Ms. Sanderson is a 46 year old female with a h/o tobacco abuse and DVTs with left breast cancer metastasized to bone. She presented to [place] ED with back pain on 12/5/2020. MRI of the L-spine showed an abnormal L4 lesion with associated right paraspinal mass, abnormalities in L5 and the left iliac bone were also seen. CT C/A/P showed a left breast mass, lytic lesions of T7, L4, and the pelvis, and a 3 cm lesion in the kidney (thought to be a cyst). Ultrasound of the bilateral lower extremities showed a non-occlusive thrombus in the left popliteal vein. Mammogram and ultrasound of the bilateral breasts on 12/17/2020 showed a spiculated mass measuring at least 7.8 cm at 12-1:00 left breast extending from the nipple to 9 cm from the nipple with associated nipple retraction. This mass was biopsied, and showed IDC with surrounding DCIS, grade 3, ER+ 90%, and RI+ 75%.  HER2 was equivocal in approximately 35% of tumor cells by FISH and was negative by IHC.    Metastatic Breast Cancer Treatment:  12/23/2021 - 1/7/2021  Radiation (3000 cGy) to the lumbar spine.  1/29/2021 - present  Ibrance, zoladex, and anastrozole.  5/17/2021 radiofrequency ablation, kyphoplasty to L4  8/20/2021  Bilateral salpingo-oophorectomies, Ibrance and anastrozole    Interval History:  Ms. Sanderson is seen today for metastatic breast cancer follow-up. She is very happy to hear about her PET scan. She has been stressed and anxious about this for the past few weeks. Generally her mood has  been better since discharge from [place] a few weeks ago besides this. She has follow-up with Dr. Tyson tomorrow.     She has been out of anastrozole of a month otherwise has been compliant with meds. Currently on day 8 of Ibrance.     She had her second Pfizer shot on Monday. Will receive the booster dose in about a month. No side effects from the vaccine.     She is continue to cut back on smoking. Using maybe 2-3 per day.     Still having a lot of low back pain. Pain is not severe all the time. Mostly when changing positions she will feel stiff. She has been out of narcotics for a few weeks but she has needed 800 mg of ibuprofen using several times per day with some relief.     She has still been relatively inactive. She did walk inside yesterday at the mall and was tired after about 30 minutes.     Appetite is lower but she is still able to eat and drink okay. No nausea or vomiting. Bowels are okay on senna and miralax.       Past Medical/Surgical History:   Past Medical History:   Diagnosis Date     Anxiety      Breast CA (H) 12/2020     Depression      DVT (deep venous thrombosis) (H) 2014     Left breast mass     x approximately 4-5 months     Pulmonary emboli (H)      Pyelonephritis      Schizoaffective disorder (H)      Tobacco use      Past Surgical History:   Procedure Laterality Date     IR LUMBAR KYPHOPLASTY VERTEBRAE  5/17/2021     LAPAROSCOPIC SALPINGO-OOPHORECTOMY Bilateral 8/20/2021    Procedure: BILATERAL SALPINGO-OOPHORECTOMY, LAPAROSCOPIC;  Surgeon: Rory oLpez MD;  Location: [facility] OR     TUBAL LIGATION  1998        Allergies   Allergen Reactions     Contrast Dye      Pt developed nausea after isovue 370 injection on 6/9/21        Current Outpatient Medications   Medication     anastrozole (ARIMIDEX) 1 MG tablet     benztropine (COGENTIN) 0.5 MG tablet     diclofenac (VOLTAREN) 1 % topical gel     gabapentin (NEURONTIN) 300 MG capsule     ibuprofen (ADVIL/MOTRIN) 800 MG tablet      methocarbamol (ROBAXIN) 500 MG tablet     morphine (MS CONTIN) 15 MG CR tablet     naloxone (NARCAN) 4 MG/0.1ML nasal spray     nicotine (NICORETTE) 2 MG gum     OLANZapine (ZYPREXA) 7.5 MG tablet     ondansetron (ZOFRAN-ODT) 4 MG ODT tab     oxyCODONE (ROXICODONE) 5 MG tablet     palbociclib (IBRANCE) 125 MG tablet     pantoprazole (PROTONIX) 40 MG EC tablet     polyethylene glycol (MIRALAX) 17 GM/Dose powder     prochlorperazine (COMPAZINE) 10 MG tablet     rivaroxaban ANTICOAGULANT (XARELTO) 20 MG TABS tablet     SENNA-docusate sodium (SENNA S) 8.6-50 MG tablet     sertraline (ZOLOFT) 100 MG tablet     tolnaftate (TINACTIN) 1 % external cream     Vitamin D3 (CHOLECALCIFEROL) 25 mcg (1000 units) tablet     No current facility-administered medications for this visit.   '      Physical Exam:   There were no vitals taken for this visit.   Wt Readings from Last 4 Encounters:   11/30/21 110.7 kg (244 lb)   11/23/21 112.1 kg (247 lb 3.2 oz)   10/12/21 108.1 kg (238 lb 6.4 oz)   09/13/21 109.8 kg (242 lb)     Video physical exam  General: Patient appears well in no acute distress.   Skin: No visualized rash or lesions on visualized skin  Eyes: EOMI, no erythema, sclera icterus or discharge noted  Resp: Appears to be breathing comfortably without accessory muscle usage, speaking in full sentences, no cough  MSK: Appears to have normal range of motion based on visualized movements  Neurologic: No apparent tremors, facial movements symmetric  Psych: affect bright, alert and oriented    The rest of a comprehensive physical examination is deferred due to PHE (public health emergency) video restrictions        Laboratory/Imaging Studies    12/28/2021 09:43   Sodium 144   Potassium 3.4   Chloride 111 (H)   Carbon Dioxide 23   Urea Nitrogen 13   Creatinine 0.67   GFR Estimate >90   Calcium 9.1   Anion Gap 10   Albumin 3.6   Protein Total 8.0   Bilirubin Total 0.4   Alkaline Phosphatase 123   ALT 21   AST 12   CA 27-29 59 (H)    Glucose 135 (H)   WBC 3.9 (L)   Hemoglobin 11.6 (L)   Hematocrit 35.2   Platelet Count 218   RBC Count 3.77 (L)   MCV 93   MCH 30.8   MCHC 33.0   RDW 15.1 (H)   % Neutrophils 69   % Lymphocytes 22   % Monocytes 6   % Eosinophils 2   % Basophils 1   Absolute Basophils 0.1   Absolute Eosinophils 0.1   Absolute Immature Granulocytes 0.0   Absolute Lymphocytes 0.8   Absolute Monocytes 0.2   % Immature Granulocytes 0   Absolute Neutrophils 2.6   Absolute NRBCs 0.0   NRBCs per 100 WBC 0   CEA 3.1 (H)      9/13/2021 12:42 10/12/2021 09:40 11/23/2021 11:01 12/28/2021 09:43   CA 27-29 50 (H) 59 (H) 48 (H) 59 (H)   CEA 2.7 (H) 2.7 (H) 3.1 (H) 3.1 (H)       Imaging: PET/CT 12/3  IMPRESSION: In this patient with history of metastatic breast cancer:     1. New borderline avidity of a left axillary node that is unchanged in  size from 6/9/2021, suggesting a reactive node although developing  metastasis is on the differential diagnosis. Consider close imaging  follow-up.     2. Increased borderline avidity to the left breast adjacent to the  clip which is also unchanged in CT appearance, suggesting combination  of chronic posttreatment uptake and artifact from attenuation  correction. Also recommend attention on follow-up to this location.     3. Otherwise no suspicious hypermetabolism to indicate metastatic  disease.     4. Unchanged scattered nonavid mixed lytic and sclerotic osseous foci,  again suggesting treated metastatic disease.     5. Extrusion of cement into/along the inferior vena cava at the L4  vertebroplasty site similar to prior.      ASSESSMENT/PLAN:   46 year old female with history of DVT with left breast invasive ductal carcinoma, ER/[place] positive, HER2 negative metastasized to bones.    1.  Metastatic breast cancer: Currently on treatment with ibrance and anastrozole. She overall tolerates well with some ?joint stiffness. She has been out of anastrozole for about a month. I sent a refill to [facility] and asked  her to start this ASAP. I reviewed PET/CT from 12/3 showing MAGDI.     Will need to continue labs and monthly follow-up to stay on track and encourage compliance.     2.  Mood disorder, previously diagnosed with schizoaffective disorder, bipolar type: Recent hospitalization to [place] where zyprexa dose was increased. Mood has been better apart from situational anxiety about scan. She is already feeling better. Sees Dr. Tyson tomorrow.     3.  Bone metastases/back pain:  She is s/p XRT to the lumbar spine and kyphoplasty of L4.  She is taking MS contin 15 mg PO BID and oxycodone prn, more recently ibuprofen. This is being managed by palliative. Encouraged her to get set up with PT. Voltaren gel is helping her hip pain.   -Refilled MS Contin and oxycodone today since next palliative appt is a month. With MAGDI, would hopefully be able to taper in the future.     She has been on Zometa since 1/18/2021 and is receiving once every 3 months. This is currently on hold due to dental work.     4.  DVT:  Recommend continuing Xarelto until contraindicated given metastatic disease.      5. Tobacco abuse: Encouraged her to continue to cut back.     6. Opioid induced constipation: Managed with miralax and senna.     40 minutes spent on the date of the encounter doing chart review, review of test results, interpretation of tests, patient visit and documentation     RICHARD Kimiris is a 46 year old who is being evaluated via a billable video visit.      How would you like to obtain your AVS? MyChart  If the video visit is dropped, the invitation should be resent by: Text to cell phone: [phone]  Will anyone else be joining your video visit? Sarah Jensen       Video Start Time: 1:35 PM  Video-Visit Details    Type of service:  Video Visit    Video End Time:1:58 PM    Originating Location (pt. Location): Home    Distant Location (provider location):  [facility]     Platform  used for Video Visit: AmWell      Again, thank you for allowing me to participate in the care of your patient.        Sincerely,        Alee Yang PA-C

## 2021-12-29 NOTE — PROGRESS NOTES
Teresa is a 46 year old who is being evaluated via a billable video visit.      How would you like to obtain your AVS? MyChart  If the video visit is dropped, the invitation should be resent by: Text to cell phone: [phone]  Will anyone else be joining your video visit? Sarah Jensen       Video Start Time: 1:35 PM  Video-Visit Details    Type of service:  Video Visit    Video End Time:1:58 PM    Originating Location (pt. Location): Home    Distant Location (provider location):  [facility]     Platform used for Video Visit: thereNow

## 2021-12-29 NOTE — TELEPHONE ENCOUNTER
"Pharmacy called for clarification of dx for Oxycodone prescription. If Dx code is for \"Acute Pain\" insurance only allows dispense of 56tablets.     This writer clarified pain r/t pt metastatic cancer pain.     "

## 2021-12-30 ENCOUNTER — TELEPHONE (OUTPATIENT)
Dept: PSYCHIATRY | Facility: CLINIC | Age: 46
End: 2021-12-30
Payer: COMMERCIAL

## 2021-12-30 NOTE — TELEPHONE ENCOUNTER
On December 30, 2021, at 12:23 PM, writer called patient at mobile to confirm Virtual Visit. Writer unable to make contact with patient. Writer unable to leave detailed voice mail message due to voice mail box full. BRIE Luna    On December 30, 2021, at 12:48 PM, writer called patient at mobile to confirm Virtual Visit. Writer unable to make contact with patient. A link to the video visit was sent to the patient's email address and mobile phone number. Romero Pitts, EMT

## 2021-12-31 ENCOUNTER — TELEPHONE (OUTPATIENT)
Dept: PSYCHIATRY | Facility: CLINIC | Age: 46
End: 2021-12-31
Payer: COMMERCIAL

## 2021-12-31 ENCOUNTER — DOCUMENTATION ONLY (OUTPATIENT)
Dept: ONCOLOGY | Facility: CLINIC | Age: 46
End: 2021-12-31
Payer: COMMERCIAL

## 2021-12-31 DIAGNOSIS — F29 PSYCHOSIS, UNSPECIFIED PSYCHOSIS TYPE (H): ICD-10-CM

## 2021-12-31 NOTE — TELEPHONE ENCOUNTER
call pt if you have time  Received: Yesterday  Karlee Tyson MD Labossiere, Laura, RICHARD; Gertrude Mtz, RICHARD; Zelda Chanel LPN; Zack Oro RN  Everyone,   IF you have time in the AM, can someone please call this pt?   She missed her appt with me today.   Had been quite ill psychiatrically, although better after the end of Nov hospitalization   She is scheduled for Jan.     I want to make sure she is ok   Seen in oncology yesterday, seemed ok   And   I want to double check which meds she is currently taking, doses, then provide refills   IF you cannot call her, please let me know and I will do it in the afternoon     Thx!   D     Follow up:     Writer reached out to patient to check in. No answer at number provided. Unable to LVM but left an SMS notification with clinic number.

## 2021-12-31 NOTE — TELEPHONE ENCOUNTER
Writer attempted to reach out to the patient the third time. No answer at number provided. Unable to LVM but SMS left with clinic call back number.

## 2022-01-03 RX ORDER — OLANZAPINE 7.5 MG/1
7.5 TABLET, FILM COATED ORAL AT BEDTIME
Qty: 30 TABLET | Refills: 0 | Status: SHIPPED | OUTPATIENT
Start: 2022-01-03 | End: 2022-01-20 | Stop reason: DRUGHIGH

## 2022-01-03 NOTE — TELEPHONE ENCOUNTER
Writer placed a call to patient to obtain an update regarding current status. Patient shared she has been feeling better lately. She received a good report from her oncologist so she is grateful of that. She reports taking mediations regularly. Denies any concerns at this time. Patient is currently requesting a refill for Zyprexa 7.5 mg HS. Will route to provider for approval.

## 2022-01-10 ENCOUNTER — PATIENT OUTREACH (OUTPATIENT)
Dept: CARE COORDINATION | Facility: CLINIC | Age: 47
End: 2022-01-10
Payer: COMMERCIAL

## 2022-01-10 NOTE — PROGRESS NOTES
Oncology Distress Screening Follow-up  Clinical Social Work  [facility]    Identified Concern and Score From Distress Screening:  3. How concerned are you about feeling depressed or very sad?  8 Abnormal        4. How concerned are you about feeling anxious or very scared?  8 Abnormal        6. How concerned are you about work and home life issues that may be affected by your cancer?  8 Abnormal        7. How concerned are you about knowing what resources are available to help you?  10 Abnormal                Date of Distress Screenin21      Data: Ms. Sanderson is a 46 year old female with left breast cancer metastasized to bone. At time of last visit, Patient scored positive on distress screen.  called Patient today with intention of introducing them to psychosocial services and support, and following up on elevated distress.        Intervention/Education Provided: Phone call to patient about distress screening. No answer - message left. Provided contact information in order to reach writer.       Follow-up Required: Will remain available for support and await patient's return call.        Jo CORTEZ, EUGENE  - Oncology  Phone : [phone]  Pager: [phone]

## 2022-01-19 ENCOUNTER — VIRTUAL VISIT (OUTPATIENT)
Dept: PSYCHIATRY | Facility: CLINIC | Age: 47
End: 2022-01-19
Attending: PSYCHIATRY & NEUROLOGY
Payer: COMMERCIAL

## 2022-01-19 DIAGNOSIS — F29 PSYCHOSIS, UNSPECIFIED PSYCHOSIS TYPE (H): Primary | ICD-10-CM

## 2022-01-19 PROCEDURE — 99214 OFFICE O/P EST MOD 30 MIN: CPT | Mod: 95 | Performed by: PSYCHIATRY & NEUROLOGY

## 2022-01-19 ASSESSMENT — PAIN SCALES - GENERAL: PAINLEVEL: SEVERE PAIN (6)

## 2022-01-19 NOTE — PROGRESS NOTES
"VIDEO VISIT  Teresa Sanderson is a 46 year old patient that has consented to receive services via billable video visit.      The patient has been notified of following:   \"This video visit will be conducted via a call between you and your physician/provider. We have found that certain health care needs can be provided without the need for an in-person physical exam. This service lets us provide the care you need with a video conversation. If a prescription is necessary we can send it directly to your pharmacy. If lab work is needed we can place an order for that and you can then stop by our lab to have the test done at a later time. Insurers are generally covering virtual visits as they would in-office visits so billing should not be different than normal.  If for some reason you do get billed incorrectly, you should contact the billing office to correct it and that number is in the AVS .    Patient will join video visit via:  text invite was sent ([employer] is preferred, but patient is unable to join via Xcalar)    If patient attempts to join the video via [employer] at appointment start time, but is unable to, they would prefer that the provider send them a video invitation via:   Text to preferred phone: [phone]      How would patient like to obtain AVS?:  MyChart    "

## 2022-01-19 NOTE — PROGRESS NOTES
Video- Visit Details  Type of service:  video visit for diagnostic assessment  Time of service:    Date:  01/19/2022    Video Start Time: 11:15 AM        Video End Time:  11:45 AM    Reason for video visit:  Patient unable to travel due to Covid-19  Originating Site (patient location):  [facility]   Location- Patient's home  Distant Site (provider location):  Remote location  Mode of Communication:  Video Conference via Doxy.me  Consent:  Patient has given verbal consent for video visit?: Yes        [facility]  Psychiatry Clinic  PSYCHIATRY PROGRESS NOTE     CARE TEAM:  PCP- No Ref-Primary, Physician   Oncology- Trey Plunkett.    Teresa Sanderson is a 46 year old patient who uses the name Janet and pronouns she, her.     DIAGNOSES                                                                                        Schizoaffective disorder, bipolar type (previously diagnosed with bipolar disorder)  Metastatic breast cancer    ASSESSMENT                                                                                          Teresa is better than before hospitalization but still quite depressed. She is only   taking half of the Zoloft dose d/t often forgetting the second half of the dose later in   the day. In the past, I have wondered about Zoloft causing mood dysregulation  but currently Zyprexa seems to be providing good stabilization and since depression   persists, will increase to 100mg in AM. Zyprexa will be split such that 2.5mg can be  taken after dinner and 5mg at hs. This may help with the anxiety that impairs sleep   onset as well as grogginess the next AM. We do need to watch wt, Teresa agreed   to get a scale, record wts and send them into me in a few weeks. Will check for the  next lab due date next time. Ultimately, may want to switch to a different SGA with  less metabolic impact.    [place]MP review was not needed today.     PLAN                                        "                                                                1) Meds  -change Zoloft to 100mg tab- take one tab every AM  -change Zyprexa 2.5mg tab- one tab (2.5mg) after dinner and two tabs (5mg) hs    2) Other: check wts as above   3) RTC:    March 1     3:00    4) CRISIS Numbers:   Provided routinely in AVS         PERTINENT BACKGROUND                                   [initial eval 11/03/21]   Medical: Diagnosed with L breast cancer in Dec 2020 after presenting with a few mos of  back pain. Mets to L4, L5, left iliac bone as well as paraspinal mass. Jan 2021 started   Ibrance, zoladex, and anastrozole; 5/17/21- s/p radiofrequency ablation, keloplasty/  segmental plasty of L4; July 2021- showing complete response to treatment.  Psych: Lifetime history of \"rapid mood shifts\". S/P 7-8 hospitalizations, all in [place] except   the 1st at [facility] in 1998. Seroquel started at that time, took 100mg TID x yrs. Details  in eval. Hospitalization [id number] is discussed in eval and documents what sounds like   a full manic episode. CA dx'd 12/2020. Zoloft started after the cancer dx. Seroquel 100mg   was stopped August 2021 (not helping), doxepin and Ambien tried for sleep-neither helped.   Meds: Seroquel x years 100mg-300mg was helpful but 300mg too sedating & became  less effective over time; Invega for a short period; Zyprexa was helpful; no Haldol or Risperdal;   Lithium did not help (? 2 yrs ago) same for Depakote; trazodone; no HOWELL    Psych pertinent item history includes suicidal ideation, psychosis, tom, mutiple   psychotropic trials, trauma hx, violent behavior, psych hosps, cocaine 2019. Last hosp  Nov 2021.    INTERIM HISTORY                                                                                               Since the last visit 11/16/2021:  -hospitalized Nov 26  -feels better now although still with symptoms  -feels better in large part due to cancer in remission  -only taking Zoloft 50mg because often " forgets the second dose  -feels Zyprexa is helpful with psychosis but may be contributing to fatigue  -having trouble sleeping b/o anxiety/worry but no longer has AHs keeping her awake  -takes Cogentin 1-2x a week prn twitches  -rates mood as 8 out of 10 with 10 being bad depression  -mood gradually gets better as day progresses but has trouble getting going and  doing tasks, feels oversedated  -also states there is wt gain/ wt has fluctuated over the last several months in part d/t  steroids  -daughter is PCA, will ask her for help if needed  -identifies grandchildren, specifcally, as protective wrt SI    Symptoms: low mood, fatigued, low motivation, conc is hard, no SI, no psychosis     Cancer Meds:  Arimidex, Ibrance, Zometa   Adverse Med Effects:  Possibly wt gain from Zyprexa  Pertinent Negatives:  No VHs, no recent paranoia  Recent Substance Use:  No illicit drugs, 1-2x/month wine; smoking 7-8 cigs a day,   smoke cannabis infrequently (need to define)    SOCIAL and FAMILY HISTORY                                                 per pt report         Family Hx:  Multiple relatives with schizophrenia  Social Hx:  Lives with cousin, supported by social security disability, has 5 adult   children and 6 grandchildren. Daughter and her 2 kids now live in transition housing.   Family feels safe. Older history-mom  when pt was 9yo, chaotic childhood and   h/o trauma. Has lived between [place] and [place] over the years.    MEDICAL HISTORY  and ALLERGY     ALLERGIES: Contrast dye     Patient Active Problem List   Diagnosis     Breast mass     Spine metastasis (H)     Urinary tract infection with hematuria     Malignant neoplasm of overlapping sites of left breast in female, estrogen receptor positive (H)     Carcinoma of left breast metastatic to bone (H)     Metastasis to bone (H)     Pain of right lower leg     Malignant neoplasm of female breast, unspecified estrogen receptor status, unspecified laterality, unspecified  site of breast (H)     Schizoaffective disorder, bipolar type (H)     Psychosis, unspecified psychosis type (H)     Insomnia, unspecified type       MEDICAL REVIEW OF SYSTEMS                                                                  Pain, fatigue  CURRENT MEDS     Not taking Robaxin, doxepin, Seroquel, Ambien  Current Outpatient Medications   Medication Sig Dispense Refill     anastrozole (ARIMIDEX) 1 MG tablet Take 1 tablet (1 mg) by mouth daily 90 tablet 3     benztropine (COGENTIN) 0.5 MG tablet Take 1 tablet (0.5 mg) by mouth 3 times daily as needed (tremor, jerking movements) 60 tablet 0     diclofenac (VOLTAREN) 1 % topical gel Apply 2 g topically 4 times daily 350 g 3     ibuprofen (ADVIL/MOTRIN) 800 MG tablet Take 800 mg by mouth every 6 hours as needed for moderate pain       methocarbamol (ROBAXIN) 500 MG tablet Take 500 mg by mouth 4 times daily as needed for muscle spasms       morphine (MS CONTIN) 15 MG CR tablet Take 1 tablet (15 mg) by mouth every 12 hours 60 tablet 0     naloxone (NARCAN) 4 MG/0.1ML nasal spray Spray 1 spray (4 mg) into one nostril alternating nostrils once as needed for opioid reversal every 2-3 minutes until assistance arrives 0.2 mL 0     nicotine (NICORETTE) 2 MG gum Place 1 each (2 mg) inside cheek every hour as needed for smoking cessation 90 each 1     OLANZapine (ZYPREXA) 7.5 MG tablet Take 1 tablet (7.5 mg) by mouth At Bedtime 30 tablet 0     ondansetron (ZOFRAN-ODT) 4 MG ODT tab Take 1 tablet (4 mg) by mouth every 6 hours as needed for nausea or vomiting 120 tablet 0     oxyCODONE (ROXICODONE) 5 MG tablet Take 1-2 tablets (5-10 mg) by mouth every 6 hours as needed for moderate to severe pain 90 tablet 0     palbociclib (IBRANCE) 125 MG tablet Take 1 tablet (125 mg) by mouth daily Take for 21 days, then 7 days off. 21 tablet 2     pantoprazole (PROTONIX) 40 MG EC tablet Take 1 tablet (40 mg) by mouth every morning (before breakfast) 30 tablet 1     polyethylene glycol  (MIRALAX) 17 GM/Dose powder Take 17 g by mouth daily as needed for constipation 578 g 3     prochlorperazine (COMPAZINE) 10 MG tablet Take 1 tablet (10 mg) by mouth every 6 hours as needed (Nausea/Vomiting) 30 tablet 2     rivaroxaban ANTICOAGULANT (XARELTO) 20 MG TABS tablet Take 1 tablet (20 mg) by mouth daily (with dinner) 30 tablet 11     SENNA-docusate sodium (SENNA S) 8.6-50 MG tablet Take 2 tablets by mouth 2 times daily as needed (Constipation) 100 tablet 3     sertraline (ZOLOFT) 100 MG tablet Take 1 tablet (100 mg) by mouth daily 30 tablet 0     tolnaftate (TINACTIN) 1 % external cream Apply topically 2 times daily 30 g 1     Vitamin D3 (CHOLECALCIFEROL) 25 mcg (1000 units) tablet Take 25 mcg by mouth daily       gabapentin (NEURONTIN) 300 MG capsule Take 2 capsules (600 mg) by mouth every morning AND 2 capsules (600 mg) daily at 2 pm AND 2 capsules (600 mg) At Bedtime. 180 capsule 0     VITALS                                                                                              There were no vitals taken for this visit.   MENTAL STATUS EXAM                                                             Alertness: alert   Appearance: N/A (phone visit)  Behavior/Demeanor: cooperative, pleasant and calm, with N/A (phone visit) eye contact   Speech: regular rate and rhythm  Language: no obvious problem  Psychomotor: N/A (phone visit)  Mood: depressed and anxious  Affect: N/A (phone visit); congruent to: mood- phone, content- phone  Thought Process/Associations: unremarkable  Thought Content:  Reports none;  Denies suicidal & violent ideation and delusions  Perception:  Reports see subjective;  Denies see subjective  Insight: fair  Judgment: fair  Cognition: (6) oriented: time, person, and place  attention span: limited  concentration: limited  recent memory: limited  remote memory: fair  fund of knowledge: appropriate  Gait and Station: N/A (telehealth)    LABS and DATA     PHQ9 Today:    No flowsheet  data found.    Recent Labs   Lab Test 12/28/21  0943 11/30/21  0736   CR 0.67 0.72   GFRESTIMATED >90 >90      Recent Labs   Lab Test 12/28/21  0943 11/23/21  1101   AST 12 10   ALT 21 22   ALKPHOS 123 110     PSYCHOTROPIC DRUG INTERACTIONS   Additive sedation, CNS depression: most drugs on this list contribute to this  Additive serotonin: also many drugs on this list but not by way of P450 intxns  Additive QT:  Zyprexa and Zofran  MANAGEMENT:  keep pt aware of potential adverse effects, may check EKG at points    RISK STATEMENT for SAFETY    Teresa Sanderson did not appear to be an imminent safety risk to self or others.    TREATMENT RISK STATEMENT:  The risks, benefits, alternatives and potential adverse effects   have been discussed and are understood by the pt. The pt understands the risks of using street   drugs or alcohol. There are no medical contraindications, the pt agrees to treatment with the ability   to do so. The pt knows to call the clinic for any problems or to access emergency care if needed.    Medical and substance use concerns are documented above.  Psychotropic drug interaction check   was done, including changes made today.    ATTENDING PHYSICIAN:  Karlee Tyson MD

## 2022-01-19 NOTE — PATIENT INSTRUCTIONS
**For crisis resources, please see the information at the end of this document**   Patient Education      Treatment Plan Today:     1) Medications-  -change Zoloft to 100mg tab- take one tab every morning  -change Zyprexa 2.5mg tab- one tab (2.5mg) after dinner and two tabs (5mg) later at night    2) Follow-up appt with Dr Tyson   March 1     3:00    3) Crisis numbers are below     4) please check your wt 1-2x a week for 2 weeks and send in the values via Schedulize    ------------------------------------------------------------------------    Thank you for coming to the [facility]    Lab Testing:  If you had lab testing today and your results are reassuring or normal they will be mailed to you or sent through Schedulize within 7 days. If the lab tests need quick action we will call you with the results. The phone number we will call with results is # [phone] (home) . If this is not the best number please call our clinic and change the number.    Medication Refills:  If you need any refills please call your pharmacy and they will contact us. Our fax number for refills is [phone]. Please allow three business for refill processing. If you need to  your refill at a new pharmacy, please contact the new pharmacy directly. The new pharmacy will help you get your medications transferred.     Scheduling:  If you have any concerns about today's visit or wish to schedule another appointment please call our office during normal business hours [phone] (8-5:00 M-F)    Contact Us:  Please call [phone] during business hours (8-5:00 M-F).  If after clinic hours, or on the weekend, please call  [phone].    Financial Assistance [phone]  [facility] Billing [phone]  [facility] Billing Office, ealth: [phone]  [facility] Billing [phone]  Medical Records [phone]  [facility] Patient Bill of Rights [email or web address]        MENTAL HEALTH CRISIS NUMBERS:  For a medical emergency please call  911 or go to the nearest ER.     [facility] ER  [phone]    [facility]:   [facility] -[phone]   Crisis Residence [facility] -[phone]   Walk-In Counseling Center [facility] -[phone]   COPE 24/7 [facility] Mobile Team -[phone] (adults)/[phone] (child)  CHILD: [facility] needs assessment team - [phone]      [facility]:   [facility] - [phone]   Walk-in counseling [facility] - [phone]   Walk-in counseling [facility] - [phone]   Crisis Residence [facility] Residence - [phone]  Urgent Care Adult Mental [facility]-[phone] mobile unit/ 24/7 crisis line    National Crisis Numbers:   National Suicide Prevention Lifeline: 5-381-906-TALK (166-729-9469)  Poison Control Center - 7-077-328-1760  [employer]/resources for a list of additional resources (SOS)  Trans Lifeline a hotline for transgender people 1-356.646.3655  [employer] a hotline for LGBT youth 2-048-521-7500  Crisis Text Line: For any crisis 24/7   To: 445080  see www.crisistextline.org  - IF MAKING A CALL FEELS TOO HARD, send a text!       Again thank you for choosing [facility] and please let us know how we can best partner with you to improve you and your family's health.    You may be receiving a survey regarding this appointment. We would love to have your feedback, both positive and negative. The survey is done by an external company, so your answers are anonymous.

## 2022-01-20 RX ORDER — SERTRALINE HYDROCHLORIDE 100 MG/1
TABLET, FILM COATED ORAL
Qty: 30 TABLET | Refills: 2 | Status: SHIPPED | OUTPATIENT
Start: 2022-01-20 | End: 2022-05-13

## 2022-01-20 RX ORDER — OLANZAPINE 2.5 MG/1
TABLET, FILM COATED ORAL
Qty: 90 TABLET | Refills: 2 | Status: SHIPPED | OUTPATIENT
Start: 2022-01-20 | End: 2022-05-13

## 2022-01-21 NOTE — PROGRESS NOTES
Palliative Care Progress Note    Patient Name: Teresa Sanderson  Primary Provider: No Ref-Primary, Physician    Chief Complaint/Patient ID: 47 yo F with metastatic breast cancer.  - Presented with back pain Dec. 2020, found to have lesions at L4, L5, and left iliac bone as well as paraspinal mass. Additional imaging revealed left breast mass and additional lesions in spine/pelvis. +DVT in L popliteal vein.  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  - s/p XRT to Lumbar spine (finished Jan 2021).  - Jan 2021 started Ibrance, zoladex, and anastrozole.   - 5/17/21- s/p radiofrequency ablation, keloplasty/segmental plasty of L4  - July 2021- showing complete response to treatment.  - Aug 2021- laparoscopic BSO 2/2 hormone receptor positive cancer.     -Hx of schizoaffective disorder with some episodes of severe psychosis, most recently in 2019. Has re-established with Psychiatry.    Last Palliative care appointment: 11/17/2021 with me.     Reviewed: Yes, no concerns.    ORT Score = 5 due to family hx of drug use and schizoaffective hx    Interim History:  Teresa Sanderson 46 year old female is seen today for follow up with Palliative Care via billable video visit.      Following with psychiatry, most recent visit 1/19/2022 reviewed.  Made adjustments to her Zoloft and Zyprexa dosing. Of note, she had a psychiatric hospitalization 11/26-11/30/21 at [place]. Voluntary admission due to auditory hallucinations and depressive symptoms with passive suicidal ideation.  Stressors include metastatic breast cancer with bone pain and two of her children not visiting for Thanksgiving.    Pain:  Ran out of opioids again in Dec, Oncology was able to refill. Pain mostly still located in her back. Not very active, occasionally going on walks.    Describes new pressure type pain in the center of her low back, near her prior surgical site. Describes it as located right in the  center and doesn't really radiate anywhere, has been present ~2-3 weeks. Heating pad is the most helpful but some help from ice as well. Has been using some voltaren with a little benefit. Has taken 3 tablets of oxycodone at one time because of the pain. Ran out of Oxycodone about 6 days ago. Thought she could only get once a month so didn't call.    Says she's been better about being active. Did not call PT after our last visit but says she's planning to. A lot of her inactivity she says was related to depression.    Swings into constipation. Using miralax with good results as well as smooth move tea. Has been out of Senna.     Doing better emotionally. Working with Dr. Tyson and feels her mood is improving.     Social History:  Lives with her daughter who serves as her PCA. Has 5 children. Extended family in [place], [place].  Social History     Tobacco Use     Smoking status: Current Every Day Smoker     Packs/day: 0.25     Types: Cigarettes     Start date: 5/13/2011     Smokeless tobacco: Never Used   Substance Use Topics     Alcohol use: Not Currently     Comment: occ     Drug use: Yes     Types: Marijuana     Comment: occ       Family History- Reviewed in Epic.    Allergies   Allergen Reactions     Contrast Dye      Pt developed nausea after isovue 370 injection on 6/9/21        Medications- Reviewed in Epic.    Past Medical History- Reviewed in [facility].    Past Surgical History- Reviewed in Epic.    Review of Systems:   ROS: 10 point ROS neg other than the symptoms noted above in the HPI.      Physical Exam:   Constitutional: Alert, pleasant, no apparent distress. Lying in bed.  Eyes: Sclera non-icteric, no eye discharge.  ENT: No nasal discharge. Ears grossly normal.  Respiratory: Unlabored respirations. Speaking in full sentences.  Musculoskeletal: Extremities appear normal- no gross deformities noted. No edema noted on upper body.   Skin: No suspicious lesions or rashes on visible skin.  Neurologic: Clear speech,  no aphasia. No facial droop.  Psychiatric: Mentation appears normal, appropriate attention. Affect normal/bright. Does not appear anxious or depressed.      Key Data Reviewed:  LABS: 12/28/21- Cr 0.67, Albumin 3.6, LFTs wnl. WBC 3.9, Hgb 11.6, Plts 218. CA 27-29 of 59 (up slightly from Nov).    IMAGING: PET scan 12/3/21- IMPRESSION: In this patient with history of metastatic breast cancer:  1. New borderline avidity of a left axillary node that is unchanged in size from 6/9/2021, suggesting a reactive node although developing metastasis is on the differential diagnosis. Consider close imaging follow-up.  2. Increased borderline avidity to the left breast adjacent to the clip whch is also unchanged in CT appearance, suggesting combination of chronic posttreatment uptake and artifact from attenuation correction. Also recommend attention on follow-up to this location.  3. Otherwise no suspicious hypermetabolism to indicate metastatic disease.  4. Unchanged scattered nonavid mixed lytic and sclerotic osseous foci, again suggesting treated metastatic disease.  5. Extrusion of cement into/along the inferior vena cava at the L4 vertebroplasty site similar to prior.    Impression & Recommendations & Counseling:  Teresa Sanderson is a 46 year old female with history of left-sided breast cancer with metastasis to bone status post radiation therapy to spine, kyphoplasty L4, and  current treatment regimen including Ibrance and anastrozole. Most recent scans showed complete response to treatment.  She additionally has a history of schizoaffective disorder.    Pain - Has some bony pain from cancer related therapies and metastatic disease. She never established with physical therapy. New worsening central back pain located at prior surgical site is possibly concerning. Denies any neurological changes associated with this.   -We will continue MS Contin 15 mg BID for now.  -Oxycodone 5-10mg every 6 hours as needed for breakthrough  pain. For this acute episode of worsening back pain, I stated she can take 3 tablets at one time but she needs to stay under 8 tablets/day.  -Discussed the need for her to call us ahead of time when she needs refills so she does not run out of medication.    OIC - Swings in and out of constipation. Discussed adding in Senna regularly as well as miralax and smooth move tea. Refill of senna sent to pharmacy.    Mood concerns  Difficulty sleeping - Has establish with psychiatry and working with Dr. Tyson on medication adjustments. Most recently adjusted Zoloft and Zyprexa.      Follow up: 2-3 months    Video-Visit Details  Video Start Time: 3:01PM  Video End Time: 3:09 PM  Video sound cut out so continued on telephone for additional 16 mins of visit.    Originating Location (pt. Location): Home     Distant Location (provider location): [facility]      Platform used for Video Visit: Gather     Total time spent on day of encounter is 32 mins, including reviewing record, review of above studies, above visit with patient, symptomatic discussion of pain, constipation, and mood, including medication adjustments/prescription management, and documentation.     Ayanna Tellez, DO  Palliative Medicine   Pager [phone], AMCOM ID [id number]    Some chart documentation performed using Dragon Voice recognition Software. Although reviewed after completion, some words and grammatical errors may remain.

## 2022-01-25 ENCOUNTER — VIRTUAL VISIT (OUTPATIENT)
Dept: PALLIATIVE CARE | Facility: CLINIC | Age: 47
End: 2022-01-25
Attending: STUDENT IN AN ORGANIZED HEALTH CARE EDUCATION/TRAINING PROGRAM
Payer: COMMERCIAL

## 2022-01-25 DIAGNOSIS — M54.9 BACK PAIN WITH HISTORY OF SPINAL SURGERY: ICD-10-CM

## 2022-01-25 DIAGNOSIS — Z51.5 ENCOUNTER FOR PALLIATIVE CARE: ICD-10-CM

## 2022-01-25 DIAGNOSIS — G89.3 CANCER ASSOCIATED PAIN: Primary | ICD-10-CM

## 2022-01-25 DIAGNOSIS — T40.2X5A THERAPEUTIC OPIOID INDUCED CONSTIPATION: ICD-10-CM

## 2022-01-25 DIAGNOSIS — F25.0 SCHIZOAFFECTIVE DISORDER, BIPOLAR TYPE (H): ICD-10-CM

## 2022-01-25 DIAGNOSIS — G47.00 INSOMNIA, UNSPECIFIED TYPE: ICD-10-CM

## 2022-01-25 DIAGNOSIS — Z98.890 BACK PAIN WITH HISTORY OF SPINAL SURGERY: ICD-10-CM

## 2022-01-25 DIAGNOSIS — K59.03 THERAPEUTIC OPIOID INDUCED CONSTIPATION: ICD-10-CM

## 2022-01-25 PROCEDURE — 99214 OFFICE O/P EST MOD 30 MIN: CPT | Mod: 95 | Performed by: STUDENT IN AN ORGANIZED HEALTH CARE EDUCATION/TRAINING PROGRAM

## 2022-01-25 RX ORDER — OXYCODONE HYDROCHLORIDE 5 MG/1
5-10 TABLET ORAL EVERY 6 HOURS PRN
Qty: 180 TABLET | Refills: 0 | Status: SHIPPED | OUTPATIENT
Start: 2022-01-25 | End: 2022-03-04

## 2022-01-25 RX ORDER — MORPHINE SULFATE 15 MG/1
15 TABLET, FILM COATED, EXTENDED RELEASE ORAL EVERY 12 HOURS
Qty: 60 TABLET | Refills: 0 | Status: SHIPPED | OUTPATIENT
Start: 2022-01-25 | End: 2022-03-04

## 2022-01-25 RX ORDER — SENNOSIDES 8.6 MG
1-2 TABLET ORAL 2 TIMES DAILY PRN
Qty: 120 TABLET | Refills: 1 | Status: SHIPPED | OUTPATIENT
Start: 2022-01-25 | End: 2023-01-05

## 2022-01-25 NOTE — PATIENT INSTRUCTIONS
"Recommendations:  -Continue morphine the same for now.  -I refilled oxycodone today. For the new worse back pain, I'm Ok with you taking 3 tablets of oxycodone at one time but limit is 8 tablets total in a day.  - Please call us ahead of time when you needs refills so you do not run out of medication.  -We discussed that goal is to have an easy to pass bowel movement at least every 3 days. Based on this, we need to make some adjustments to your bowel medications.  There are 2 different types of laxatives: 1) \"Pushers\" such as senna, which are stimulant laxatives and tell your body and needs to have a bowel movement and 2) \"Mushers\" such as MiraLAX, which is an osmotic laxative bringing water into the colon and making it easier to go to the bathroom.  In general people need a combination of both types.    -For the senna, recommend increasing to 2 tablets twice a day. May increase up to 4 tablets twice a day if needed for regular bowel movement and may need to decrease if your bowels become loose.  -For MiraLAX, can increase to 2-3 doses daily as needed.  You also can do partial doses if an additional full dose is too much (such as one half capful later on in the day).    Follow up: 2-3 months      Reasons to Call    If you are having worsening/uncontrolled symptoms we want you to call!    You or your other physicians make any changes to medications we have prescribed.  -Please call for refills 4-5 days before you will run out of medication.    Important Phone Numbers    [place] [facility] - Aisha Shelton. Palliative Care RN - [phone]    [facility] - Betty Daly. Palliative Care RN - [phone]  *For Refills, scheduling, and general questions - [phone]  *After hours or on weekends. Will connect you with on call MD. [phone]      "

## 2022-01-25 NOTE — LETTER
1/25/2022       RE: Teresa Sanderson  [address] [address]     Dear Colleague,    Thank you for referring your patient, Teresa Sanderson, [facility]. Please see a copy of my visit note below.    Palliative Care Progress Note    Patient Name: Teresa Sanderson  Primary Provider: No Ref-Primary, Physician    Chief Complaint/Patient ID: 47 yo F with metastatic breast cancer.  - Presented with back pain Dec. 2020, found to have lesions at L4, L5, and left iliac bone as well as paraspinal mass. Additional imaging revealed left breast mass and additional lesions in spine/pelvis. +DVT in L popliteal vein.  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  - s/p XRT to Lumbar spine (finished Jan 2021).  - Jan 2021 started Ibrance, zoladex, and anastrozole.   - 5/17/21- s/p radiofrequency ablation, keloplasty/segmental plasty of L4  - July 2021- showing complete response to treatment.  - Aug 2021- laparoscopic BSO 2/2 hormone receptor positive cancer.     -Hx of schizoaffective disorder with some episodes of severe psychosis, most recently in 2019. Has re-established with Psychiatry.    Last Palliative care appointment: 11/17/2021 with me.     Reviewed: Yes, no concerns.    ORT Score = 5 due to family hx of drug use and schizoaffective hx    Interim History:  Teresa Sanderson 46 year old female is seen today for follow up with Palliative Care via billable video visit.      Following with psychiatry, most recent visit 1/19/2022 reviewed.  Made adjustments to her Zoloft and Zyprexa dosing. Of note, she had a psychiatric hospitalization 11/26-11/30/21 at [facility]. Voluntary admission due to auditory hallucinations and depressive symptoms with passive suicidal ideation.  Stressors include metastatic breast cancer with bone pain and two of her children not  visiting for Thanksgiving.    Pain:  Ran out of opioids again in Dec, Oncology was able to refill. Pain mostly still located in her back. Not very active, occasionally going on walks.    Describes new pressure type pain in the center of her low back, near her prior surgical site. Describes it as located right in the center and doesn't really radiate anywhere, has been present ~2-3 weeks. Heating pad is the most helpful but some help from ice as well. Has been using some voltaren with a little benefit. Has taken 3 tablets of oxycodone at one time because of the pain. Ran out of Oxycodone about 6 days ago. Thought she could only get once a month so didn't call.    Says she's been better about being active. Did not call PT after our last visit but says she's planning to. A lot of her inactivity she says was related to depression.    Swings into constipation. Using miralax with good results as well as smooth move tea. Has been out of Senna.     Doing better emotionally. Working with Dr. Tyson and feels her mood is improving.     Social History:  Lives with her daughter who serves as her PCA. Has 5 children. Extended family in [place], [place].  Social History     Tobacco Use     Smoking status: Current Every Day Smoker     Packs/day: 0.25     Types: Cigarettes     Start date: 5/13/2011     Smokeless tobacco: Never Used   Substance Use Topics     Alcohol use: Not Currently     Comment: occ     Drug use: Yes     Types: Marijuana     Comment: occ       Family History- Reviewed in Epic.    Allergies   Allergen Reactions     Contrast Dye      Pt developed nausea after isovue 370 injection on 6/9/21        Medications- Reviewed in Epic.    Past Medical History- Reviewed in [facility].    Past Surgical History- Reviewed in Epic.    Review of Systems:   ROS: 10 point ROS neg other than the symptoms noted above in the HPI.      Physical Exam:   Constitutional: Alert, pleasant, no apparent distress. Lying in bed.  Eyes: Sclera non-icteric,  no eye discharge.  ENT: No nasal discharge. Ears grossly normal.  Respiratory: Unlabored respirations. Speaking in full sentences.  Musculoskeletal: Extremities appear normal- no gross deformities noted. No edema noted on upper body.   Skin: No suspicious lesions or rashes on visible skin.  Neurologic: Clear speech, no aphasia. No facial droop.  Psychiatric: Mentation appears normal, appropriate attention. Affect normal/bright. Does not appear anxious or depressed.      Key Data Reviewed:  LABS: 12/28/21- Cr 0.67, Albumin 3.6, LFTs wnl. WBC 3.9, Hgb 11.6, Plts 218. CA 27-29 of 59 (up slightly from Nov).    IMAGING: PET scan 12/3/21- IMPRESSION: In this patient with history of metastatic breast cancer:  1. New borderline avidity of a left axillary node that is unchanged in size from 6/9/2021, suggesting a reactive node although developing metastasis is on the differential diagnosis. Consider close imaging follow-up.  2. Increased borderline avidity to the left breast adjacent to the clip whch is also unchanged in CT appearance, suggesting combination of chronic posttreatment uptake and artifact from attenuation correction. Also recommend attention on follow-up to this location.  3. Otherwise no suspicious hypermetabolism to indicate metastatic disease.  4. Unchanged scattered nonavid mixed lytic and sclerotic osseous foci, again suggesting treated metastatic disease.  5. Extrusion of cement into/along the inferior vena cava at the L4 vertebroplasty site similar to prior.    Impression & Recommendations & Counseling:  Teresa Sanderson is a 46 year old female with history of left-sided breast cancer with metastasis to bone status post radiation therapy to spine, kyphoplasty L4, and  current treatment regimen including Ibrance and anastrozole. Most recent scans showed complete response to treatment.  She additionally has a history of schizoaffective disorder.    Pain - Has some bony pain from cancer related therapies  and metastatic disease. She never established with physical therapy. New worsening central back pain located at prior surgical site is possibly concerning. Denies any neurological changes associated with this.   -We will continue MS Contin 15 mg BID for now.  -Oxycodone 5-10mg every 6 hours as needed for breakthrough pain. For this acute episode of worsening back pain, I stated she can take 3 tablets at one time but she needs to stay under 8 tablets/day.  -Discussed the need for her to call us ahead of time when she needs refills so she does not run out of medication.    OIC - Swings in and out of constipation. Discussed adding in Senna regularly as well as miralax and smooth move tea. Refill of senna sent to pharmacy.    Mood concerns  Difficulty sleeping - Has establish with psychiatry and working with Dr. yTson on medication adjustments. Most recently adjusted Zoloft and Zyprexa.      Follow up: 2-3 months    Video-Visit Details  Video Start Time: 3:01PM  Video End Time: 3:09 PM  Video sound cut out so continued on telephone for additional 16 mins of visit.    Originating Location (pt. Location): Home     Distant Location (provider location): [facility]      Platform used for Video Visit: Chad     Total time spent on day of encounter is 32 mins, including reviewing record, review of above studies, above visit with patient, symptomatic discussion of pain, constipation, and mood, including medication adjustments/prescription management, and documentation.     Ayanna Tellez DO  Palliative Medicine   Pager [phone], AMCOM ID [id number]    Some chart documentation performed using Dragon Voice recognition Software. Although reviewed after completion, some words and grammatical errors may remain.

## 2022-01-25 NOTE — PROGRESS NOTES
Teresa is a 46 year old who is being evaluated via a billable video visit.      How would you like to obtain your AVS? MyChart  If the video visit is dropped, the invitation should be resent by: Text to cell phone: [phone]  Will anyone else be joining your video visit? No

## 2022-02-07 ENCOUNTER — INFUSION THERAPY VISIT (OUTPATIENT)
Dept: ONCOLOGY | Facility: CLINIC | Age: 47
End: 2022-02-07
Attending: INTERNAL MEDICINE
Payer: COMMERCIAL

## 2022-02-07 DIAGNOSIS — C50.912 CARCINOMA OF LEFT BREAST METASTATIC TO BONE (H): Primary | ICD-10-CM

## 2022-02-07 DIAGNOSIS — C79.51 CARCINOMA OF LEFT BREAST METASTATIC TO BONE (H): Primary | ICD-10-CM

## 2022-02-07 DIAGNOSIS — Z17.0 MALIGNANT NEOPLASM OF OVERLAPPING SITES OF LEFT BREAST IN FEMALE, ESTROGEN RECEPTOR POSITIVE (H): ICD-10-CM

## 2022-02-07 DIAGNOSIS — C79.51 SPINE METASTASIS: ICD-10-CM

## 2022-02-07 DIAGNOSIS — C50.919 METASTATIC BREAST CANCER: ICD-10-CM

## 2022-02-07 DIAGNOSIS — C50.812 MALIGNANT NEOPLASM OF OVERLAPPING SITES OF LEFT BREAST IN FEMALE, ESTROGEN RECEPTOR POSITIVE (H): ICD-10-CM

## 2022-02-11 ENCOUNTER — TELEPHONE (OUTPATIENT)
Dept: ONCOLOGY | Facility: CLINIC | Age: 47
End: 2022-02-11
Payer: COMMERCIAL

## 2022-02-11 NOTE — TELEPHONE ENCOUNTER
Prior Authorization Approval    Authorization Effective Date: 2/11/2022  Authorization Expiration Date: 2/11/2023  Medication: Ibrance renewal approved  Approved Dose/Quantity:   Reference #: [id number]   Insurance Company: [employer] - Phone [phone] Fax [phone]  Expected CoPay:       CoPay Card Available:      Foundation Assistance Needed:    Which Pharmacy is filling the prescription (Not needed for infusion/clinic administered): [facility] - [address]  Pharmacy Notified: No  Patient Notified: No      Rhina called from [employer], stated Ibrance has been approved another year.   I called [facility] and told them, and called [facility] and left a message with a male who answered the phone.     Thank you, any further questions feel free to contact me if needed    Gricel Henderson Pharmacy Liaison, [facility]  Phone: [phone]  Fax: [phone]  Email: [email or web address]

## 2022-02-11 NOTE — TELEPHONE ENCOUNTER
PA Initiation    Medication: Ibrance renewal  Insurance Company: "[employer]" - Phone [phone] Fax [phone]  Pharmacy Filling the Rx: [facility] - [place] [address] [address]  Filling Pharmacy Phone:    Filling Pharmacy Fax:    Start Date: 2/11/2022

## 2022-02-13 NOTE — PROGRESS NOTES
Oncology Visit:   Date on this visit: Feb 14, 2022    Diagnosis:  ER positive left breast cancer metastasized to bones.     Primary Physician: No Ref-Primary, Physician     History Of Present Illness:    Ms. Sanderson is a 46 year old female with a h/o tobacco abuse and DVTs with left breast cancer metastasized to bone. She presented to [facility] ED with back pain on 12/5/2020. MRI of the L-spine showed an abnormal L4 lesion with associated right paraspinal mass, abnormalities in L5 and the left iliac bone were also seen. CT C/A/P showed a left breast mass, lytic lesions of T7, L4, and the pelvis, and a 3 cm lesion in the kidney (thought to be a cyst). Ultrasound of the bilateral lower extremities showed a non-occlusive thrombus in the left popliteal vein. Mammogram and ultrasound of the bilateral breasts on 12/17/2020 showed a spiculated mass measuring at least 7.8 cm at 12-1:00 left breast extending from the nipple to 9 cm from the nipple with associated nipple retraction. This mass was biopsied, and showed IDC with surrounding DCIS, grade 3, ER+ 90%, and GA+ 75%.  HER2 was equivocal in approximately 35% of tumor cells by FISH and was negative by IHC.    Metastatic Breast Cancer Treatment:  12/23/2021 - 1/7/2021  Radiation (3000 cGy) to the lumbar spine.  1/29/2021 - present  Ibrance, zoladex, and anastrozole.  5/17/2021 radiofrequency ablation, kyphoplasty to L4  8/20/2021  Bilateral salpingo-oophorectomies, Ibrance and anastrozole    Interval History:  Ms. Sanderson informs me today that she has been off Ibrance x 3 weeks and off anastrozole x 2 months.  She states this is because she has been under so much stress, that she hasn't had the motivation or time to refill it.  She reports worsening of anxiety and depression, which is also likely contributing.  Much of this is triggered because her daughter and two grandchildren will be moving away from her.   She states her daughter told her she needs to work on  herself before she can help take care of her.  Teresa has been staying with her cousin.  She states she can no longer do so and anticipates she will be homeless in the next couple of days.  She feels alone.  She denies recurrence of breast mass.  She has ongoing back pain.  She denies worsening or new pain.  She has bilateral knee pain with occasional popping.  She has had no fevers, chills, or infectious complaints.  She denies cough, shortness of breath or chest pain.  She has no current abdominal complaints.  She had all of her upper teeth pulled approximately 2 weeks ago.  She therefore has been on a soft food diet.  As such, she believes she has lost some weight.  The remainder of a complete 12 point review of systems was reviewed with the patient and was negative with the exception of that mentioned above.    Past Medical/Surgical History:   Past Medical History:   Diagnosis Date     Anxiety      Breast CA (H) 12/2020     Depression      DVT (deep venous thrombosis) (H) 2014     Left breast mass     x approximately 4-5 months     Pulmonary emboli (H)      Pyelonephritis      Schizoaffective disorder (H)      Tobacco use      Past Surgical History:   Procedure Laterality Date     IR LUMBAR KYPHOPLASTY VERTEBRAE  5/17/2021     LAPAROSCOPIC SALPINGO-OOPHORECTOMY Bilateral 8/20/2021    Procedure: BILATERAL SALPINGO-OOPHORECTOMY, LAPAROSCOPIC;  Surgeon: Rory Lopez MD;  Location: [facility] OR     TUBAL LIGATION  1998        Allergies   Allergen Reactions     Contrast Dye      Pt developed nausea after isovue 370 injection on 6/9/21        Current Outpatient Medications   Medication     anastrozole (ARIMIDEX) 1 MG tablet     benztropine (COGENTIN) 0.5 MG tablet     diclofenac (VOLTAREN) 1 % topical gel     gabapentin (NEURONTIN) 300 MG capsule     ibuprofen (ADVIL/MOTRIN) 800 MG tablet     methocarbamol (ROBAXIN) 500 MG tablet     morphine (MS CONTIN) 15 MG CR tablet     naloxone (NARCAN) 4 MG/0.1ML nasal  spray     nicotine (NICORETTE) 2 MG gum     OLANZapine (ZYPREXA) 2.5 MG tablet     ondansetron (ZOFRAN-ODT) 4 MG ODT tab     oxyCODONE (ROXICODONE) 5 MG tablet     palbociclib (IBRANCE) 125 MG tablet     pantoprazole (PROTONIX) 40 MG EC tablet     polyethylene glycol (MIRALAX) 17 GM/Dose powder     prochlorperazine (COMPAZINE) 10 MG tablet     rivaroxaban ANTICOAGULANT (XARELTO) 20 MG TABS tablet     SENNA-docusate sodium (SENNA S) 8.6-50 MG tablet     sennosides (SENOKOT) 8.6 MG tablet     sertraline (ZOLOFT) 100 MG tablet     tolnaftate (TINACTIN) 1 % external cream     Vitamin D3 (CHOLECALCIFEROL) 25 mcg (1000 units) tablet     No current facility-administered medications for this visit.   '  Physical Exam:   BP (!) 146/93   Pulse 83   Temp 98.8  F (37.1  C)   Resp 18   Wt 108.4 kg (239 lb)   SpO2 96%   BMI 32.41 kg/m    General:  Fatigued, tearful appearing adult female in NAD.  Alert and oriented.  HEENT:  Normocephalic.  Sclera anicteric.  MMM.  No lesions of the oropharynx.  Has had all of her upper teeth pulled; no dentures as of yet.  Lymph:  No palpable cervical, supraclavicular, or axillary LAD.  Chest:  CTA bilaterally.  No wheezes or crackles.  CV:  RRR.  Nl S1 and S2.    Abd:  Soft/NT/ND.  BSs normoactive.    Ext:  No pitting edema of the bilateral lower extremities.    Musculo:  Strength 5/5 throughout.  Good movement of all 4 extremities.  Neuro:  Cranial nerves grossly intact.  Gait is stable.  Psych:  Mood and affect appear normal.    Laboratory/Imaging Studies:   2/14/2022 Labs:  Electrolytes, creatinine, and LFTs are wnl.  Blood counts are wnl.    CA27.29  59 --- 48 --- 59 --- 43 (10/12/2021 - 2/14/2022)    CEA 2.7 --- 3.1 --- 3.1 --- 2.1 (10/12/2021 - 2/14/2022)    Imaging:   PET/CT 12/3/2021  IMPRESSION: In this patient with history of metastatic breast cancer:     1. New borderline avidity of a left axillary node that is unchanged in size from 6/9/2021, suggesting a reactive node.  Consider close imaging follow-up.     2. Increased borderline avidity to the left breast adjacent to the clip which is unchanged in CT appearance, suggesting combination of chronic posttreatment uptake and artifact from attenuation correction.     3. No suspicious hypermetabolism to indicate metastatic disease.     4. Unchanged scattered nonavid mixed lytic and sclerotic osseous foci, again suggesting treated metastatic disease.    ASSESSMENT/PLAN:   46 year old female with history of DVT and left breast invasive ductal carcinoma, ER/UT positive, HER2 negative metastasized to bones.    1.  Metastatic breast cancer: Teresa informs me today that she has been off of anastrozole x 2 months and off of Ibrance x 3 weeks.  This is due to a number of social stressors and mental illness.  We reviewed that last imaging on 12/3 was without evidence of active disease.  Despite being off of treatment tumor markers are stable.      I discussed with her today the nature of metastatic breast cancer.  We discussed if the pressure applied to the cancer via treatment is withdrawn, the cancer is more likely to develop early resistance.  In other words, the treatment won't work as long as it would have, had the treatment not been withdrawn.  It is therefore very important to avoid breaks in treatment when possible.  I discussed with oral chemotherapy pharmacy and they will ensure next cycle is delivered to the patient.  We reviewed importance of monthly labs and provider visits while on treatment.  I recommend repeat PET/CT in 3 months.  She will need premedication for this due to known contrast allergy.    2.  Schizoaffective disorder, bipolar type: She is on treatment with Zoloft and Zyprexa.  Ongoing follow up with Dr. Tyson.  She has increased anxiety around scans.    3.  Bone metastases/back pain:  She is s/p XRT to the lumbar spine and kyphoplasty of L4.  She is taking MS contin 15 mg PO BID and oxycodone and ibu profen prn.  She  also takes methocarbamol prn muscle spasms. Pain regimen is being managed by palliative medicine.     On Zometa since 1/18/2021 and is receiving once every 3 months. Was on hold due to dental work. Is scheduled for an infusion tomorrow, however, given she just had her teeth pulled, we will need to postpone this for 3 months.    4.  DVT:  She confirms that she has continued on Xarelto.  Recommend continuing Xarelto until contraindicated given metastatic disease.      5. Homelessness:  She has the threat of homelessness.  She is working with [facility] and a program called [facility].  I will have our  touch base with her as well to ensure all services are being explored.     6.  Follow Up:  Labs and visit with Alee in 4 and 8 weeks.  Labs and visit with me in 12 weeks.  PET/CT 1-2 business days prior to return visit with me.    45 minutes spent on the date of the encounter doing chart review, review of test results, interpretation of tests, patient visit, documentation and discussion with other provider(s).

## 2022-02-14 ENCOUNTER — ONCOLOGY VISIT (OUTPATIENT)
Dept: ONCOLOGY | Facility: CLINIC | Age: 47
End: 2022-02-14
Attending: INTERNAL MEDICINE
Payer: COMMERCIAL

## 2022-02-14 ENCOUNTER — APPOINTMENT (OUTPATIENT)
Dept: LAB | Facility: CLINIC | Age: 47
End: 2022-02-14
Attending: INTERNAL MEDICINE
Payer: COMMERCIAL

## 2022-02-14 VITALS
HEART RATE: 83 BPM | RESPIRATION RATE: 18 BRPM | WEIGHT: 239 LBS | DIASTOLIC BLOOD PRESSURE: 93 MMHG | OXYGEN SATURATION: 96 % | TEMPERATURE: 98.8 F | SYSTOLIC BLOOD PRESSURE: 146 MMHG | BODY MASS INDEX: 32.41 KG/M2

## 2022-02-14 DIAGNOSIS — C79.51 CARCINOMA OF LEFT BREAST METASTATIC TO BONE (H): ICD-10-CM

## 2022-02-14 DIAGNOSIS — F25.0 SCHIZOAFFECTIVE DISORDER, BIPOLAR TYPE (H): ICD-10-CM

## 2022-02-14 DIAGNOSIS — C50.812 MALIGNANT NEOPLASM OF OVERLAPPING SITES OF LEFT BREAST IN FEMALE, ESTROGEN RECEPTOR POSITIVE (H): Primary | ICD-10-CM

## 2022-02-14 DIAGNOSIS — C50.912 CARCINOMA OF LEFT BREAST METASTATIC TO BONE (H): ICD-10-CM

## 2022-02-14 DIAGNOSIS — Z51.11 ENCOUNTER FOR ANTINEOPLASTIC CHEMOTHERAPY: ICD-10-CM

## 2022-02-14 DIAGNOSIS — Z59.811 HOUSING INSTABILITY, CURRENTLY HOUSED, AT RISK FOR HOMELESSNESS: ICD-10-CM

## 2022-02-14 DIAGNOSIS — Z91.041 ALLERGIC TO IV CONTRAST: ICD-10-CM

## 2022-02-14 DIAGNOSIS — Z17.0 MALIGNANT NEOPLASM OF OVERLAPPING SITES OF LEFT BREAST IN FEMALE, ESTROGEN RECEPTOR POSITIVE (H): Primary | ICD-10-CM

## 2022-02-14 DIAGNOSIS — K08.9 POOR DENTITION: ICD-10-CM

## 2022-02-14 PROCEDURE — 99215 OFFICE O/P EST HI 40 MIN: CPT | Performed by: INTERNAL MEDICINE

## 2022-02-14 PROCEDURE — G0463 HOSPITAL OUTPT CLINIC VISIT: HCPCS

## 2022-02-14 SDOH — ECONOMIC STABILITY - HOUSING INSECURITY: HOUSING INSTABILITY WITH RISK OF HOMELESSNESS: Z59.811

## 2022-02-14 NOTE — LETTER
2/14/2022         RE: Teresa Sanderson  [address] [facility] [address]        Dear Colleague,    Thank you for referring your patient, Teresa Sanderson, to the [facility]. Please see a copy of my visit note below.    Oncology Visit:   Date on this visit: Feb 14, 2022    Diagnosis:  ER positive left breast cancer metastasized to bones.     Primary Physician: No Ref-Primary, Physician     History Of Present Illness:    Ms. Sanderson is a 46 year old female with a h/o tobacco abuse and DVTs with left breast cancer metastasized to bone. She presented to [place] ED with back pain on 12/5/2020. MRI of the L-spine showed an abnormal L4 lesion with associated right paraspinal mass, abnormalities in L5 and the left iliac bone were also seen. CT C/A/P showed a left breast mass, lytic lesions of T7, L4, and the pelvis, and a 3 cm lesion in the kidney (thought to be a cyst). Ultrasound of the bilateral lower extremities showed a non-occlusive thrombus in the left popliteal vein. Mammogram and ultrasound of the bilateral breasts on 12/17/2020 showed a spiculated mass measuring at least 7.8 cm at 12-1:00 left breast extending from the nipple to 9 cm from the nipple with associated nipple retraction. This mass was biopsied, and showed IDC with surrounding DCIS, grade 3, ER+ 90%, and FL+ 75%.  HER2 was equivocal in approximately 35% of tumor cells by FISH and was negative by IHC.    Metastatic Breast Cancer Treatment:  12/23/2021 - 1/7/2021  Radiation (3000 cGy) to the lumbar spine.  1/29/2021 - present  Ibrance, zoladex, and anastrozole.  5/17/2021 radiofrequency ablation, kyphoplasty to L4  8/20/2021  Bilateral salpingo-oophorectomies, Ibrance and anastrozole    Interval History:  Ms. Sanderson informs me today that she has been off Ibrance x 3 weeks and off anastrozole x 2 months.  She states this is because she has been under so much stress, that she hasn't had the motivation or time  to refill it.  She reports worsening of anxiety and depression, which is also likely contributing.  Much of this is triggered because her daughter and two grandchildren will be moving away from her.   She states her daughter told her she needs to work on herself before she can help take care of her.  Teresa has been staying with her cousin.  She states she can no longer do so and anticipates she will be homeless in the next couple of days.  She feels alone.  She denies recurrence of breast mass.  She has ongoing back pain.  She denies worsening or new pain.  She has bilateral knee pain with occasional popping.  She has had no fevers, chills, or infectious complaints.  She denies cough, shortness of breath or chest pain.  She has no current abdominal complaints.  She had all of her upper teeth pulled approximately 2 weeks ago.  She therefore has been on a soft food diet.  As such, she believes she has lost some weight.  The remainder of a complete 12 point review of systems was reviewed with the patient and was negative with the exception of that mentioned above.    Past Medical/Surgical History:   Past Medical History:   Diagnosis Date     Anxiety      Breast CA (H) 12/2020     Depression      DVT (deep venous thrombosis) (H) 2014     Left breast mass     x approximately 4-5 months     Pulmonary emboli (H)      Pyelonephritis      Schizoaffective disorder (H)      Tobacco use      Past Surgical History:   Procedure Laterality Date     IR LUMBAR KYPHOPLASTY VERTEBRAE  5/17/2021     LAPAROSCOPIC SALPINGO-OOPHORECTOMY Bilateral 8/20/2021    Procedure: BILATERAL SALPINGO-OOPHORECTOMY, LAPAROSCOPIC;  Surgeon: Rory Lopez MD;  Location: [facility] OR     TUBAL LIGATION  1998        Allergies   Allergen Reactions     Contrast Dye      Pt developed nausea after isovue 370 injection on 6/9/21        Current Outpatient Medications   Medication     anastrozole (ARIMIDEX) 1 MG tablet     benztropine (COGENTIN) 0.5 MG  tablet     diclofenac (VOLTAREN) 1 % topical gel     gabapentin (NEURONTIN) 300 MG capsule     ibuprofen (ADVIL/MOTRIN) 800 MG tablet     methocarbamol (ROBAXIN) 500 MG tablet     morphine (MS CONTIN) 15 MG CR tablet     naloxone (NARCAN) 4 MG/0.1ML nasal spray     nicotine (NICORETTE) 2 MG gum     OLANZapine (ZYPREXA) 2.5 MG tablet     ondansetron (ZOFRAN-ODT) 4 MG ODT tab     oxyCODONE (ROXICODONE) 5 MG tablet     palbociclib (IBRANCE) 125 MG tablet     pantoprazole (PROTONIX) 40 MG EC tablet     polyethylene glycol (MIRALAX) 17 GM/Dose powder     prochlorperazine (COMPAZINE) 10 MG tablet     rivaroxaban ANTICOAGULANT (XARELTO) 20 MG TABS tablet     SENNA-docusate sodium (SENNA S) 8.6-50 MG tablet     sennosides (SENOKOT) 8.6 MG tablet     sertraline (ZOLOFT) 100 MG tablet     tolnaftate (TINACTIN) 1 % external cream     Vitamin D3 (CHOLECALCIFEROL) 25 mcg (1000 units) tablet     No current facility-administered medications for this visit.   '  Physical Exam:   BP (!) 146/93   Pulse 83   Temp 98.8  F (37.1  C)   Resp 18   Wt 108.4 kg (239 lb)   SpO2 96%   BMI 32.41 kg/m    General:  Fatigued, tearful appearing adult female in NAD.  Alert and oriented.  HEENT:  Normocephalic.  Sclera anicteric.  MMM.  No lesions of the oropharynx.  Has had all of her upper teeth pulled; no dentures as of yet.  Lymph:  No palpable cervical, supraclavicular, or axillary LAD.  Chest:  CTA bilaterally.  No wheezes or crackles.  CV:  RRR.  Nl S1 and S2.    Abd:  Soft/NT/ND.  BSs normoactive.    Ext:  No pitting edema of the bilateral lower extremities.    Musculo:  Strength 5/5 throughout.  Good movement of all 4 extremities.  Neuro:  Cranial nerves grossly intact.  Gait is stable.  Psych:  Mood and affect appear normal.    Laboratory/Imaging Studies:   2/14/2022 Labs:  Electrolytes, creatinine, and LFTs are wnl.  Blood counts are wnl.    CA27.29  59 --- 48 --- 59 --- 43 (10/12/2021 - 2/14/2022)    CEA 2.7 --- 3.1 --- 3.1 --- 2.1  (10/12/2021 - 2/14/2022)    Imaging:   PET/CT 12/3/2021  IMPRESSION: In this patient with history of metastatic breast cancer:     1. New borderline avidity of a left axillary node that is unchanged in size from 6/9/2021, suggesting a reactive node. Consider close imaging follow-up.     2. Increased borderline avidity to the left breast adjacent to the clip which is unchanged in CT appearance, suggesting combination of chronic posttreatment uptake and artifact from attenuation correction.     3. No suspicious hypermetabolism to indicate metastatic disease.     4. Unchanged scattered nonavid mixed lytic and sclerotic osseous foci, again suggesting treated metastatic disease.    ASSESSMENT/PLAN:   46 year old female with history of DVT and left breast invasive ductal carcinoma, ER/DE positive, HER2 negative metastasized to bones.    1.  Metastatic breast cancer: Teresa informs me today that she has been off of anastrozole x 2 months and off of Ibrance x 3 weeks.  This is due to a number of social stressors and mental illness.  We reviewed that last imaging on 12/3 was without evidence of active disease.  Despite being off of treatment tumor markers are stable.      I discussed with her today the nature of metastatic breast cancer.  We discussed if the pressure applied to the cancer via treatment is withdrawn, the cancer is more likely to develop early resistance.  In other words, the treatment won't work as long as it would have, had the treatment not been withdrawn.  It is therefore very important to avoid breaks in treatment when possible.  I discussed with oral chemotherapy pharmacy and they will ensure next cycle is delivered to the patient.  We reviewed importance of monthly labs and provider visits while on treatment.  I recommend repeat PET/CT in 3 months.  She will need premedication for this due to known contrast allergy.    2.  Schizoaffective disorder, bipolar type: She is on treatment with Zoloft and  Zyprexa.  Ongoing follow up with Dr. Tyson.  She has increased anxiety around scans.    3.  Bone metastases/back pain:  She is s/p XRT to the lumbar spine and kyphoplasty of L4.  She is taking MS contin 15 mg PO BID and oxycodone and ibu profen prn.  She also takes methocarbamol prn muscle spasms. Pain regimen is being managed by palliative medicine.     On Zometa since 1/18/2021 and is receiving once every 3 months. Was on hold due to dental work. Is scheduled for an infusion tomorrow, however, given she just had her teeth pulled, we will need to postpone this for 3 months.    4.  DVT:  She confirms that she has continued on Xarelto.  Recommend continuing Xarelto until contraindicated given metastatic disease.      5. Homelessness:  She has the threat of homelessness.  She is working with [facility] and a program called [facility].  I will have our  touch base with her as well to ensure all services are being explored.     6.  Follow Up:  Labs and visit with Alee in 4 and 8 weeks.  Labs and visit with me in 12 weeks.  PET/CT 1-2 business days prior to return visit with me.    45 minutes spent on the date of the encounter doing chart review, review of test results, interpretation of tests, patient visit, documentation and discussion with other provider(s).         Again, thank you for allowing me to participate in the care of your patient.        Sincerely,        Jahaira Plunkett MD

## 2022-02-14 NOTE — NURSING NOTE
Oncology Rooming Note    February 14, 2022 4:30 PM   Teresa Sanderson is a 46 year old female who presents for:    Chief Complaint   Patient presents with     Labs Only     venipuncture, vitals checked     Oncology Clinic Visit     Breast cancer     Initial Vitals: BP (!) 146/93   Pulse 83   Temp 98.8  F (37.1  C)   Resp 18   Wt 108.4 kg (239 lb)   SpO2 96%   BMI 32.41 kg/m   Estimated body mass index is 32.41 kg/m  as calculated from the following:    Height as of 11/26/21: 1.829 m (6').    Weight as of this encounter: 108.4 kg (239 lb). Body surface area is 2.35 meters squared.  No Pain (0) Comment: Data Unavailable   No LMP recorded. (Menstrual status: Tubal ).  Allergies reviewed: Yes  Medications reviewed: Yes    Medications: MEDICATION REFILLS NEEDED TODAY. Provider was notified.  Pharmacy name entered into EPIC:    [facility] - [place], [place] - [address] [facility] [address]  [id number] - [place], [place] - [address] [facility] - [place] - [address]  [facility] - [place], [place] - [address]  [facility] - [place], [place] - [address]    Clinical concerns: Medication refill: Gabapentin and Ibrance       Manisha Jimenez LPN

## 2022-02-14 NOTE — NURSING NOTE
Chief Complaint   Patient presents with     Labs Only     venipuncture, vitals checked     Karma Martinez RN on 2/14/2022 at 3:26 PM

## 2022-02-15 DIAGNOSIS — C50.812 MALIGNANT NEOPLASM OF OVERLAPPING SITES OF LEFT BREAST IN FEMALE, ESTROGEN RECEPTOR POSITIVE (H): ICD-10-CM

## 2022-02-15 DIAGNOSIS — Z17.0 MALIGNANT NEOPLASM OF OVERLAPPING SITES OF LEFT BREAST IN FEMALE, ESTROGEN RECEPTOR POSITIVE (H): ICD-10-CM

## 2022-02-15 DIAGNOSIS — C79.51 CARCINOMA OF LEFT BREAST METASTATIC TO BONE (H): Primary | ICD-10-CM

## 2022-02-15 DIAGNOSIS — C50.912 CARCINOMA OF LEFT BREAST METASTATIC TO BONE (H): Primary | ICD-10-CM

## 2022-02-16 ENCOUNTER — PATIENT OUTREACH (OUTPATIENT)
Dept: CARE COORDINATION | Facility: CLINIC | Age: 47
End: 2022-02-16
Payer: COMMERCIAL

## 2022-02-18 RX ORDER — METHYLPREDNISOLONE 32 MG/1
TABLET ORAL
Qty: 2 TABLET | Refills: 0 | Status: SHIPPED | OUTPATIENT
Start: 2022-02-18 | End: 2022-05-13

## 2022-02-23 NOTE — PROGRESS NOTES
Social Work Follow-Up Encounter Visit  Oncology Clinic    Data/Intervention:  Patient Name:  Teresa Sanderson  /Age:  1975 (46 year old)    Reason for Follow-Up:  Social supports    Collaborated With:    -Patient  -        Assessment:  SW following up on referral from patient's care team called to speak with patient about financial assistance and housing resources. SW has previously spoken with pateint about similar resources and intended to check in about assistance with supportive resources and reevaluate needed supports. No answer, message left with reason for call and contact information to reach writer. SW emailed patient housing and financial assistance resources previously discussed.      Plan:  Previously provided patient/family with writer's contact information and availability.   SW will await patient's return call and remain available as needed.         Jo CORTEZ, EUGENE  - Oncology  Phone : [phone]  Pager: [phone]

## 2022-02-28 ENCOUNTER — TELEPHONE (OUTPATIENT)
Dept: ONCOLOGY | Facility: CLINIC | Age: 47
End: 2022-02-28
Payer: COMMERCIAL

## 2022-02-28 NOTE — TELEPHONE ENCOUNTER
Oral Chemotherapy Monitoring Program    Subjective/Objective:  Teresa Sanderson is a 46 year old female contacted by phone for a follow-up visit for oral chemotherapy. Teresa confirms taking the appropriate dose of Ibrance 125mg daily for 3 weeks on and 1 week off. Denies new or worsening side effects, missed doses, medication changes or recent hospital or ED visits. She states that her dental extractions are complete and that the dental office will be reaching out to the clinic to notify them that she is good to get scheduled for her Zometa infusion.     ORAL CHEMOTHERAPY 9/3/2021 9/16/2021 9/26/2021 10/12/2021 12/23/2021 12/31/2021 2/28/2022   Assessment Type Initial Follow up Refill Chart Review Refill Refill Chart Review -   Diagnosis Code Breast Cancer Breast Cancer Breast Cancer Breast Cancer Breast Cancer Breast Cancer Breast Cancer   Providers Dr. Dimitrios Plunkett   [facility]   Drug Name Ibrance (palbociclib) Ibrance (palbociclib) Ibrance (palbociclib) Ibrance (palbociclib) Ibrance (palbociclib) Ibrance (palbociclib) Ibrance (palbociclib)   Dose 125 mg 125 mg 125 mg 125 mg - 125 mg 125 mg   Current Schedule Daily Daily Daily Daily - Daily Daily   Cycle Details 3 weeks on, 1 week off 3 weeks on, 1 week off 3 weeks on, 1 week off 3 weeks on, 1 week off - 3 weeks on, 1 week off 3 weeks on, 1 week off   Start Date of Last Cycle 8/28/2021 - - - - - 2/20/2022   Planned next cycle start date - - - - - - -   Doses missed in last 2 weeks 0 - - - - - 0   Adherence Assessment Adherent - - - - - Adherent   Reason for Non-adherence - - - - - - -   Adherence Intervention Recommended - - - - - - -   Adverse Effects No AE identified during assessment - - - - - No AE identified during assessment   Any new drug interactions? No - - - - - No   Pharmacist Intervention? - - - - - - -    Intervention(s) - - - - - - -   Is the dose as ordered appropriate for the patient? Yes - - - - - Yes   Since the last time we talked, have you been hospitalized or used the emergency room? No - - - - - No       Last PHQ-2 Score on record:   PHQ-2 ( 1999 [employer]) 5/13/2021   Q1: Little interest or pleasure in doing things 1   Q2: Feeling down, depressed or hopeless 1   PHQ-2 Score 2   PHQ-2 Total Score (12-17 Years)- Positive if 3 or more points; Administer PHQ-A if positive 2       Vitals:  BP:   BP Readings from Last 1 Encounters:   02/14/22 (!) 146/93     Wt Readings from Last 1 Encounters:   02/14/22 108.4 kg (239 lb)     Estimated body surface area is 2.35 meters squared as calculated from the following:    Height as of 11/26/21: 1.829 m (6').    Weight as of 2/14/22: 108.4 kg (239 lb).    Labs:  _  Result Component Current Result Ref Range   Sodium 141 (2/14/2022) 133 - 144 mmol/L     _  Result Component Current Result Ref Range   Potassium 3.4 (2/14/2022) 3.4 - 5.3 mmol/L     _  Result Component Current Result Ref Range   Calcium 9.4 (2/14/2022) 8.5 - 10.1 mg/dL     No results found for Mag within last 30 days.     No results found for Phos within last 30 days.     _  Result Component Current Result Ref Range   Albumin 3.5 (2/14/2022) 3.4 - 5.0 g/dL     _  Result Component Current Result Ref Range   Urea Nitrogen 9 (2/14/2022) 7 - 30 mg/dL     _  Result Component Current Result Ref Range   Creatinine 0.62 (2/14/2022) 0.52 - 1.04 mg/dL     _  Result Component Current Result Ref Range   AST 14 (2/14/2022) 0 - 45 U/L     _  Result Component Current Result Ref Range   ALT 20 (2/14/2022) 0 - 50 U/L     _  Result Component Current Result Ref Range   Bilirubin Total 0.2 (2/14/2022) 0.2 - 1.3 mg/dL     _  Result Component Current Result Ref Range   WBC Count 6.3 (2/14/2022) 4.0 - 11.0 10e3/uL     _  Result Component Current Result Ref Range   Hemoglobin 12.1 (2/14/2022) 11.7 - 15.7 g/dL     _  Result Component  Current Result Ref Range   Platelet Count 253 (2/14/2022) 150 - 450 10e3/uL     No results found for ANC within last 30 days.     _  Result Component Current Result Ref Range   Absolute Neutrophils 3.9 (2/14/2022) 1.6 - 8.3 10e3/uL          Assessment/Plan:  Teresa is tolerating the restart of therapy well. Continue Ibrance therapy as planned.     Follow-Up:  3/14: labs + appt with Alee    Refill Due:  3/14 for 3/20      Li Paige, PharmD, BCACP  Oral Chemotherapy Monitoring Program  [facility]  [phone]

## 2022-03-02 ENCOUNTER — TELEPHONE (OUTPATIENT)
Dept: PSYCHIATRY | Facility: CLINIC | Age: 47
End: 2022-03-02
Payer: COMMERCIAL

## 2022-03-02 NOTE — TELEPHONE ENCOUNTER
[facility] Outreach    Provider Note:  Appreciate the outreach call.  I think the pt should take the higher Zyprexa dose of 7.5mg total.  It is not just for sleep, but for mood stabilization and that is the dose the hospital provider (Nov) planned for her to take--which I agreed with-  2.5/ 5mg as described in the note.  She should watch her wt with dose increase and be sure to split it to reduce morning grogginess.     RE: Zoloft-  If taking 50mg or 100mg then make no change and I will reassess next time.  If taking none, and there is no apparent medical reason for being off, then she should start taking 50mg AM.      RNCC Note:  Called patient to follow up on how she has been doing since her last appointment with Dr. Tyson. She said that she is feeling better - her biggest stressors are housing and finances, and she met with a  from the [facility] program who will be helping her with those issues.     She said that in relation to the last visit, when she would rate her depression level as 10/10, she is now at 6-7/10. She denied suicidal ideation. She reported that she sleeps well after taking olanzapine. It sounds like she might have received an old dose when her medication was dispensed, as her bottle indicates take 2.5 mg tabs twice a day. She said that she takes one tab at dinnertime and one tab at bedtime, and finds this dose effective. Writer explained that at her last appointment, the dose was changed to 2.5 mg after dinner and 5 mg at bedtime. She is okay with taking the higher dose. Writer asked her to continue taking the medication as she has been taking it for now since she finds it effective, and writer will call her back if Dr. Tyson would like her to take it as prescribed at the most recent visit.    Appetite is not that high, but she reported that this is not a change and she just has not had much of an appetite in general.    Vomiting has improved - this is  no longer a concern today. Denioed stomach pains. She was able to have a bowel movement an hour ago. Recommended that she take Miralax daily for constipation since she has medications that has this as a side effect, and to take Senna PRN. She agreed with the plan.    Routed to Dr. Tyson for recommendations re: Zyprexa dose.

## 2022-03-02 NOTE — TELEPHONE ENCOUNTER
----- Message from Sydnie Mane sent at 3/2/2022  9:46 AM CST -----  Regarding: FW: pt needs appt and phone call for  Hi! I scheduled pt for April 13th and she said should would like nursing to check in with her!    Thanks so much,  Sydnie  ----- Message -----  From: Karlee Tyson MD  Sent: 3/1/2022  10:10 PM CST  To: Abby Nj RN, Psychiatry Social Work, #  Subject: pt needs appt and phone call for                 Hello All,  This pt had to miss her appt today d/t feeling ill.    Scheduling,  Please call pt and offer an appt on April 13.   Also, please ask pt if she needs to connect with the nursing team since the appt was missed today.  Connect with nursing to check-in regarding how she is doing, ask questions or ask for help.    SW team,  Please see my note from today.  Please call pt to see how she is doing and check on housing/finances. If pt needs assistance,    please coordinate with Oncology SW team since they have been offering assistance with these issues. This phone call should be a mental health check-in more than anything else.  Thanks very much for your help. If she needs medical-psych attn please LMK.      Thanks all!  Angelica

## 2022-03-03 NOTE — TELEPHONE ENCOUNTER
Karlee Tyson MD  You 15 hours ago (5:18 PM)     DB    Thanks so much for calling her Carrie!   I think she should take the higher Zyprexa dose of 7.5mg total.   It is not just for sleep, but for mood stabilization and that is the dose the hospital provider planned for her to take--which I agreed with-  2.5/ 5mg as described in your (and my) note     You didn't mention the Zoloft.   The plan was for her to take 100mg but she was only taking 50mg when I saw her.   If she is taking 100mg, then make no change   If she is taking 50mg make no change now and I will bump the dose when I see her.   A higher Zyprexa dose will help cover a higher Zoloft dose in reducing risk for escalation.     So--no change to Zoloft no matter what the dose is (if off, and no good reason, then she should go back to 50mg) and bump Zyprexa as 2.5/5mg and monitor wt     Does this make sense?   I will add note to chart   Thanks tons!     This can wait til tomorrow        Follow up:    - called [facility] to relay the message from Dr. Tyson regarding the Zyprexa dose - 2.5 mg at ~dinner, and 5 mg at bedtime. She repeated the dose back and agreed to implement it tonight. She also confirmed that she is taking 100 mg of Zoloft daily.     - asked her to monitor her weight as Zyprexa can increase it. She does not have much of an appetite these days, but agreed to be mindful.    - she said that she feels better today and has not had any more instances of emesis. It does appear that it might have been from the omelette. She will not eat out, or at least not at [employer].     - also asked her to monitor constipation, and what symptoms would necessitate urgent evaluation. She expressed understanding.    - she confirmed her appointment with Dr. Tyson on April 13 at 11:30 am, and will log in by 11:15 am to give time for troubleshooting if needed.    - she agreed to call the clinic if any issues arise before her next appointment.

## 2022-03-11 ENCOUNTER — TELEPHONE (OUTPATIENT)
Dept: PSYCHIATRY | Facility: CLINIC | Age: 47
End: 2022-03-11
Payer: COMMERCIAL

## 2022-03-11 NOTE — TELEPHONE ENCOUNTER
Social Work   Incoming/Outgoing Call  [facility]    Outgoing Call To: Teresa Sanderson  Callback number: [phone]    Reason for Call:  To provide a mental health check-in.    Response/Plan: This writer called Teresa and assessed for safety. Teresa denied any SI or plan or intent. She stated she feels very frustrated by her family, but she is doing well medically. She is taking all her medications as prescribed by Dr. Tyson. She is going home for a few days for some rest.    Will route to patient's current psychiatric provider(s) as an FYI.   Please call or EPIC message with any questions or concerns.    DANA Sanchez, [facility]  [phone]

## 2022-03-11 NOTE — PROGRESS NOTES
Oncology Visit:   Date on this visit: Mar 14, 2022    Diagnosis:  ER positive left breast cancer metastasized to bones.     Primary Physician: No Ref-Primary, Physician     History Of Present Illness:    Ms. Sanderson is a 46 year old female with a h/o tobacco abuse and DVTs with left breast cancer metastasized to bone. She presented to [place] ED with back pain on 12/5/2020. MRI of the L-spine showed an abnormal L4 lesion with associated right paraspinal mass, abnormalities in L5 and the left iliac bone were also seen. CT C/A/P showed a left breast mass, lytic lesions of T7, L4, and the pelvis, and a 3 cm lesion in the kidney (thought to be a cyst). Ultrasound of the bilateral lower extremities showed a non-occlusive thrombus in the left popliteal vein. Mammogram and ultrasound of the bilateral breasts on 12/17/2020 showed a spiculated mass measuring at least 7.8 cm at 12-1:00 left breast extending from the nipple to 9 cm from the nipple with associated nipple retraction. This mass was biopsied, and showed IDC with surrounding DCIS, grade 3, ER+ 90%, and KS+ 75%.  HER2 was equivocal in approximately 35% of tumor cells by FISH and was negative by IHC.    Metastatic Breast Cancer Treatment:  12/23/2021 - 1/7/2021  Radiation (3000 cGy) to the lumbar spine.  1/29/2021 - present  Ibrance, zoladex, and anastrozole.  5/17/2021 radiofrequency ablation, kyphoplasty to L4  8/20/2021  Bilateral salpingo-oophorectomies, Ibrance and anastrozole    Interval History: Teresa is feeling okay. She is working on finding a new place to live. She has been living with her cousin but there is not the same approach to cleaning and guest with COVID which has made Teresa anxious and uncomfortable. She is desiring to leave. She has an appointment to discuss a CADI waiver in a few days. [place]. Despite these stressors, her mood has been fairly stable. She is consistently taking olanzapine and  sleeping okay.     She has consistently been taking Ibrance and anastrozole as well. No fevers or recent infections. No cough or SOB. No new or different pain. Her low back pain is controlled with pain medications. She is managing constipation with miralax and sometimes Senna. No nausea or vomiting. Her appetite is low but she is eating anyway and weight is stable.     She had all her upper teeth pulled the end of January. Has a dental appointment in a few weeks.       Past Medical/Surgical History:   Past Medical History:   Diagnosis Date     Anxiety      Breast CA (H) 12/2020     Depression      DVT (deep venous thrombosis) (H) 2014     Left breast mass     x approximately 4-5 months     Pulmonary emboli (H)      Pyelonephritis      Schizoaffective disorder (H)      Tobacco use      Past Surgical History:   Procedure Laterality Date     IR LUMBAR KYPHOPLASTY VERTEBRAE  5/17/2021     LAPAROSCOPIC SALPINGO-OOPHORECTOMY Bilateral 8/20/2021    Procedure: BILATERAL SALPINGO-OOPHORECTOMY, LAPAROSCOPIC;  Surgeon: Rory Lopez MD;  Location: [facility] OR     TUBAL LIGATION  1998        Allergies   Allergen Reactions     Contrast Dye      Pt developed nausea after isovue 370 injection on 6/9/21        Current Outpatient Medications   Medication     anastrozole (ARIMIDEX) 1 MG tablet     benztropine (COGENTIN) 0.5 MG tablet     diclofenac (VOLTAREN) 1 % topical gel     gabapentin (NEURONTIN) 300 MG capsule     ibuprofen (ADVIL/MOTRIN) 800 MG tablet     methocarbamol (ROBAXIN) 500 MG tablet     methylPREDNISolone (MEDROL) 32 MG tablet     morphine (MS CONTIN) 15 MG CR tablet     naloxone (NARCAN) 4 MG/0.1ML nasal spray     nicotine (NICORETTE) 2 MG gum     OLANZapine (ZYPREXA) 2.5 MG tablet     ondansetron (ZOFRAN-ODT) 4 MG ODT tab     oxyCODONE (ROXICODONE) 5 MG tablet     palbociclib (IBRANCE) 125 MG tablet     pantoprazole (PROTONIX) 40 MG EC tablet     polyethylene glycol (MIRALAX) 17 GM/Dose powder      prochlorperazine (COMPAZINE) 10 MG tablet     rivaroxaban ANTICOAGULANT (XARELTO) 20 MG TABS tablet     SENNA-docusate sodium (SENNA S) 8.6-50 MG tablet     sennosides (SENOKOT) 8.6 MG tablet     sertraline (ZOLOFT) 100 MG tablet     tolnaftate (TINACTIN) 1 % external cream     Vitamin D3 (CHOLECALCIFEROL) 25 mcg (1000 units) tablet     No current facility-administered medications for this visit.   '  Physical Exam:   /85   Pulse 90   Temp 98.9  F (37.2  C)   Resp 18   Wt 109.8 kg (242 lb)   SpO2 98%   BMI 32.82 kg/m     Wt Readings from Last 4 Encounters:   03/14/22 109.8 kg (242 lb)   02/14/22 108.4 kg (239 lb)   11/30/21 110.7 kg (244 lb)   11/23/21 112.1 kg (247 lb 3.2 oz)     General:  Fatigued, tearful appearing adult female in NAD.  Alert and oriented.  HEENT:  Normocephalic.  Sclera anicteric.  MMM.  No lesions of the oropharynx.  Has had all of her upper teeth pulled; no dentures as of yet.  Lymph:  No palpable cervical, supraclavicular, or axillary LAD.  Chest:  CTA bilaterally.  No wheezes or crackles.  CV:  RRR.  Nl S1 and S2.    Abd:  Soft/NT/ND.  BS normoactive.    Ext:  No pitting edema of the bilateral lower extremities.    Musculo:  Strength 5/5 throughout.  Good movement of all 4 extremities.  Neuro:  Cranial nerves grossly intact.  Gait is stable.  Psych:  Mood and affect appear normal.    Laboratory/Imaging Studies:    3/14/2022 14:48   Sodium 141   Potassium 4.0   Chloride 109   Carbon Dioxide 26   Urea Nitrogen 14   Creatinine 0.86   GFR Estimate 84   Calcium 9.2   Anion Gap 6   Albumin 3.6   Protein Total 7.5   Bilirubin Total 0.2   Alkaline Phosphatase 122   ALT 21   AST 18   Glucose 130 (H)   WBC 3.5 (L)   Hemoglobin 11.2 (L)   Hematocrit 34.4 (L)   Platelet Count 174   RBC Count 3.58 (L)   MCV 96   MCH 31.3   MCHC 32.6   RDW 15.5 (H)   % Neutrophils 57   % Lymphocytes 35   % Monocytes 2   % Eosinophils 3   % Basophils 3   Absolute Basophils 0.1   Absolute Neutrophil 2.0    Absolute Lymphocytes 1.2   Absolute Monocytes 0.1   Absolute Eosinophils 0.1   RBC Morphology Confirmed RBC Indices   Platelet Morphology Automated Count Confirmed. Platelet morphology is normal.       ASSESSMENT/PLAN:   46 year old female with history of DVT and left breast invasive ductal carcinoma, ER/SC positive, HER2 negative metastasized to bones.    1.  Metastatic breast cancer: She has been consistently taking anastrozole now for the past month and is taking ibrance consistently as well. Her tumor markers last month were improved and she has no worrisome signs for progression of disease at this time.  Plan for repeat PET/CT in 3 months.  She will need premedication for this due to known contrast allergy.    2.  Schizoaffective disorder, bipolar type: She is on treatment with Zoloft and Zyprexa.  Ongoing follow up with Dr. Tyson.  She has increased anxiety around scans.    3.  Bone metastases/back pain:  She is s/p XRT to the lumbar spine and kyphoplasty of L4.  She is taking MS contin 15 mg PO BID and oxycodone and ibu profen prn.  She also takes methocarbamol prn muscle spasms. Pain regimen is being managed by palliative medicine.     On Zometa since 1/18/2021 and is receiving once every 3 months. Was on hold due to dental work. She had dental extractions end of January, will plan to resume Zometa end of April.     4.  DVT:  She confirms that she has continued on Xarelto.  Recommend continuing Xarelto until contraindicated given metastatic disease.      5. Housing instability: She received resources our  sent. She is working with the [facility] for a CADI waiver. It appears she can live with her cousin longer rather it is her desire to leave driving the move. We agreed together living with her cousin for now was probably a safer option for her than a homeless shelter.     Greater than 40 minutes was spent with this patient with greater than 20 minutes spent in counseling and coordination of care.    Alee  RICHARD Yang

## 2022-03-14 ENCOUNTER — ONCOLOGY VISIT (OUTPATIENT)
Dept: ONCOLOGY | Facility: CLINIC | Age: 47
End: 2022-03-14
Attending: PHYSICIAN ASSISTANT
Payer: COMMERCIAL

## 2022-03-14 ENCOUNTER — APPOINTMENT (OUTPATIENT)
Dept: LAB | Facility: CLINIC | Age: 47
End: 2022-03-14
Attending: INTERNAL MEDICINE
Payer: COMMERCIAL

## 2022-03-14 VITALS
SYSTOLIC BLOOD PRESSURE: 133 MMHG | BODY MASS INDEX: 32.82 KG/M2 | WEIGHT: 242 LBS | OXYGEN SATURATION: 98 % | RESPIRATION RATE: 18 BRPM | HEART RATE: 90 BPM | TEMPERATURE: 98.9 F | DIASTOLIC BLOOD PRESSURE: 85 MMHG

## 2022-03-14 DIAGNOSIS — Z17.0 MALIGNANT NEOPLASM OF OVERLAPPING SITES OF LEFT BREAST IN FEMALE, ESTROGEN RECEPTOR POSITIVE (H): ICD-10-CM

## 2022-03-14 DIAGNOSIS — C50.912 CARCINOMA OF LEFT BREAST METASTATIC TO BONE (H): Primary | ICD-10-CM

## 2022-03-14 DIAGNOSIS — C79.51 CARCINOMA OF LEFT BREAST METASTATIC TO BONE (H): Primary | ICD-10-CM

## 2022-03-14 DIAGNOSIS — C50.812 MALIGNANT NEOPLASM OF OVERLAPPING SITES OF LEFT BREAST IN FEMALE, ESTROGEN RECEPTOR POSITIVE (H): ICD-10-CM

## 2022-03-14 LAB
ALBUMIN SERPL-MCNC: 3.6 G/DL (ref 3.4–5)
ALP SERPL-CCNC: 122 U/L (ref 40–150)
ALT SERPL W P-5'-P-CCNC: 21 U/L (ref 0–50)
ANION GAP SERPL CALCULATED.3IONS-SCNC: 6 MMOL/L (ref 3–14)
AST SERPL W P-5'-P-CCNC: 18 U/L (ref 0–45)
BASOPHILS # BLD MANUAL: 0.1 10E3/UL (ref 0–0.2)
BASOPHILS NFR BLD MANUAL: 3 %
BILIRUB SERPL-MCNC: 0.2 MG/DL (ref 0.2–1.3)
BUN SERPL-MCNC: 14 MG/DL (ref 7–30)
CALCIUM SERPL-MCNC: 9.2 MG/DL (ref 8.5–10.1)
CANCER AG27-29 SERPL-ACNC: 42 U/ML (ref 0–39)
CEA SERPL-MCNC: 2.3 UG/L (ref 0–2.5)
CHLORIDE BLD-SCNC: 109 MMOL/L (ref 94–109)
CO2 SERPL-SCNC: 26 MMOL/L (ref 20–32)
CREAT SERPL-MCNC: 0.86 MG/DL (ref 0.52–1.04)
EOSINOPHIL # BLD MANUAL: 0.1 10E3/UL (ref 0–0.7)
EOSINOPHIL NFR BLD MANUAL: 3 %
ERYTHROCYTE [DISTWIDTH] IN BLOOD BY AUTOMATED COUNT: 15.5 % (ref 10–15)
GFR SERPL CREATININE-BSD FRML MDRD: 84 ML/MIN/1.73M2
GLUCOSE BLD-MCNC: 130 MG/DL (ref 70–99)
HCT VFR BLD AUTO: 34.4 % (ref 35–47)
HGB BLD-MCNC: 11.2 G/DL (ref 11.7–15.7)
LYMPHOCYTES # BLD MANUAL: 1.2 10E3/UL (ref 0.8–5.3)
LYMPHOCYTES NFR BLD MANUAL: 35 %
MCH RBC QN AUTO: 31.3 PG (ref 26.5–33)
MCHC RBC AUTO-ENTMCNC: 32.6 G/DL (ref 31.5–36.5)
MCV RBC AUTO: 96 FL (ref 78–100)
MONOCYTES # BLD MANUAL: 0.1 10E3/UL (ref 0–1.3)
MONOCYTES NFR BLD MANUAL: 2 %
NEUTROPHILS # BLD MANUAL: 2 10E3/UL (ref 1.6–8.3)
NEUTROPHILS NFR BLD MANUAL: 57 %
PLAT MORPH BLD: NORMAL
PLATELET # BLD AUTO: 174 10E3/UL (ref 150–450)
POTASSIUM BLD-SCNC: 4 MMOL/L (ref 3.4–5.3)
PROT SERPL-MCNC: 7.5 G/DL (ref 6.8–8.8)
RBC # BLD AUTO: 3.58 10E6/UL (ref 3.8–5.2)
RBC MORPH BLD: NORMAL
SODIUM SERPL-SCNC: 141 MMOL/L (ref 133–144)
WBC # BLD AUTO: 3.5 10E3/UL (ref 4–11)

## 2022-03-14 PROCEDURE — 36415 COLL VENOUS BLD VENIPUNCTURE: CPT | Performed by: PHYSICIAN ASSISTANT

## 2022-03-14 PROCEDURE — 80053 COMPREHEN METABOLIC PANEL: CPT | Performed by: PHYSICIAN ASSISTANT

## 2022-03-14 PROCEDURE — 86300 IMMUNOASSAY TUMOR CA 15-3: CPT | Performed by: PHYSICIAN ASSISTANT

## 2022-03-14 PROCEDURE — 82378 CARCINOEMBRYONIC ANTIGEN: CPT | Performed by: PHYSICIAN ASSISTANT

## 2022-03-14 PROCEDURE — 99215 OFFICE O/P EST HI 40 MIN: CPT | Performed by: PHYSICIAN ASSISTANT

## 2022-03-14 PROCEDURE — G0463 HOSPITAL OUTPT CLINIC VISIT: HCPCS

## 2022-03-14 PROCEDURE — 85027 COMPLETE CBC AUTOMATED: CPT | Performed by: PHYSICIAN ASSISTANT

## 2022-03-14 ASSESSMENT — PAIN SCALES - GENERAL: PAINLEVEL: SEVERE PAIN (7)

## 2022-03-14 NOTE — NURSING NOTE
Karma Martinez RN on 3/14/2022 at 2:50 PM  Chief Complaint   Patient presents with     Labs Only     venipuncture, vitals checked

## 2022-03-14 NOTE — LETTER
3/14/2022         RE: Teresa Sanderson  [address] [address]      Oncology Visit:   Date on this visit: Mar 14, 2022    Diagnosis:  ER positive left breast cancer metastasized to bones.     Primary Physician: No Ref-Primary, Physician     History Of Present Illness:    Ms. Sanderson is a 46 year old female with a h/o tobacco abuse and DVTs with left breast cancer metastasized to bone. She presented to [place] ED with back pain on 12/5/2020. MRI of the L-spine showed an abnormal L4 lesion with associated right paraspinal mass, abnormalities in L5 and the left iliac bone were also seen. CT C/A/P showed a left breast mass, lytic lesions of T7, L4, and the pelvis, and a 3 cm lesion in the kidney (thought to be a cyst). Ultrasound of the bilateral lower extremities showed a non-occlusive thrombus in the left popliteal vein. Mammogram and ultrasound of the bilateral breasts on 12/17/2020 showed a spiculated mass measuring at least 7.8 cm at 12-1:00 left breast extending from the nipple to 9 cm from the nipple with associated nipple retraction. This mass was biopsied, and showed IDC with surrounding DCIS, grade 3, ER+ 90%, and PA+ 75%.  HER2 was equivocal in approximately 35% of tumor cells by FISH and was negative by IHC.    Metastatic Breast Cancer Treatment:  12/23/2021 - 1/7/2021  Radiation (3000 cGy) to the lumbar spine.  1/29/2021 - present  Ibrance, zoladex, and anastrozole.  5/17/2021 radiofrequency ablation, kyphoplasty to L4  8/20/2021  Bilateral salpingo-oophorectomies, Ibrance and anastrozole    Interval History: Teresa is feeling okay. She is working on finding a new place to live. She has been living with her cousin but there is not the same approach to cleaning and guest with COVID which has made Teresa anxious and uncomfortable. She is desiring to leave. She has an appointment to discuss a CADI waiver in a few days. Her [place]. Despite  these stressors, her mood has been fairly stable. She is consistently taking olanzapine and sleeping okay.     She has consistently been taking Ibrance and anastrozole as well. No fevers or recent infections. No cough or SOB. No new or different pain. Her low back pain is controlled with pain medications. She is managing constipation with miralax and sometimes Senna. No nausea or vomiting. Her appetite is low but she is eating anyway and weight is stable.     She had all her upper teeth pulled the end of January. Has a dental appointment in a few weeks.       Past Medical/Surgical History:   Past Medical History:   Diagnosis Date     Anxiety      Breast CA (H) 12/2020     Depression      DVT (deep venous thrombosis) (H) 2014     Left breast mass     x approximately 4-5 months     Pulmonary emboli (H)      Pyelonephritis      Schizoaffective disorder (H)      Tobacco use      Past Surgical History:   Procedure Laterality Date     IR LUMBAR KYPHOPLASTY VERTEBRAE  5/17/2021     LAPAROSCOPIC SALPINGO-OOPHORECTOMY Bilateral 8/20/2021    Procedure: BILATERAL SALPINGO-OOPHORECTOMY, LAPAROSCOPIC;  Surgeon: Rory Lopez MD;  Location: [facility] OR     TUBAL LIGATION  1998        Allergies   Allergen Reactions     Contrast Dye      Pt developed nausea after isovue 370 injection on 6/9/21        Current Outpatient Medications   Medication     anastrozole (ARIMIDEX) 1 MG tablet     benztropine (COGENTIN) 0.5 MG tablet     diclofenac (VOLTAREN) 1 % topical gel     gabapentin (NEURONTIN) 300 MG capsule     ibuprofen (ADVIL/MOTRIN) 800 MG tablet     methocarbamol (ROBAXIN) 500 MG tablet     methylPREDNISolone (MEDROL) 32 MG tablet     morphine (MS CONTIN) 15 MG CR tablet     naloxone (NARCAN) 4 MG/0.1ML nasal spray     nicotine (NICORETTE) 2 MG gum     OLANZapine (ZYPREXA) 2.5 MG tablet     ondansetron (ZOFRAN-ODT) 4 MG ODT tab     oxyCODONE (ROXICODONE) 5 MG tablet     palbociclib (IBRANCE) 125 MG tablet     pantoprazole  (PROTONIX) 40 MG EC tablet     polyethylene glycol (MIRALAX) 17 GM/Dose powder     prochlorperazine (COMPAZINE) 10 MG tablet     rivaroxaban ANTICOAGULANT (XARELTO) 20 MG TABS tablet     SENNA-docusate sodium (SENNA S) 8.6-50 MG tablet     sennosides (SENOKOT) 8.6 MG tablet     sertraline (ZOLOFT) 100 MG tablet     tolnaftate (TINACTIN) 1 % external cream     Vitamin D3 (CHOLECALCIFEROL) 25 mcg (1000 units) tablet     No current facility-administered medications for this visit.   '  Physical Exam:   /85   Pulse 90   Temp 98.9  F (37.2  C)   Resp 18   Wt 109.8 kg (242 lb)   SpO2 98%   BMI 32.82 kg/m     Wt Readings from Last 4 Encounters:   03/14/22 109.8 kg (242 lb)   02/14/22 108.4 kg (239 lb)   11/30/21 110.7 kg (244 lb)   11/23/21 112.1 kg (247 lb 3.2 oz)     General:  Fatigued, tearful appearing adult female in NAD.  Alert and oriented.  HEENT:  Normocephalic.  Sclera anicteric.  MMM.  No lesions of the oropharynx.  Has had all of her upper teeth pulled; no dentures as of yet.  Lymph:  No palpable cervical, supraclavicular, or axillary LAD.  Chest:  CTA bilaterally.  No wheezes or crackles.  CV:  RRR.  Nl S1 and S2.    Abd:  Soft/NT/ND.  BS normoactive.    Ext:  No pitting edema of the bilateral lower extremities.    Musculo:  Strength 5/5 throughout.  Good movement of all 4 extremities.  Neuro:  Cranial nerves grossly intact.  Gait is stable.  Psych:  Mood and affect appear normal.    Laboratory/Imaging Studies:    3/14/2022 14:48   Sodium 141   Potassium 4.0   Chloride 109   Carbon Dioxide 26   Urea Nitrogen 14   Creatinine 0.86   GFR Estimate 84   Calcium 9.2   Anion Gap 6   Albumin 3.6   Protein Total 7.5   Bilirubin Total 0.2   Alkaline Phosphatase 122   ALT 21   AST 18   Glucose 130 (H)   WBC 3.5 (L)   Hemoglobin 11.2 (L)   Hematocrit 34.4 (L)   Platelet Count 174   RBC Count 3.58 (L)   MCV 96   MCH 31.3   MCHC 32.6   RDW 15.5 (H)   % Neutrophils 57   % Lymphocytes 35   % Monocytes 2   %  Eosinophils 3   % Basophils 3   Absolute Basophils 0.1   Absolute Neutrophil 2.0   Absolute Lymphocytes 1.2   Absolute Monocytes 0.1   Absolute Eosinophils 0.1   RBC Morphology Confirmed RBC Indices   Platelet Morphology Automated Count Confirmed. Platelet morphology is normal.       ASSESSMENT/PLAN:   46 year old female with history of DVT and left breast invasive ductal carcinoma, ER/OR positive, HER2 negative metastasized to bones.    1.  Metastatic breast cancer: She has been consistently taking anastrozole now for the past month and is taking ibrance consistently as well. Her tumor markers last month were improved and she has no worrisome signs for progression of disease at this time.  Plan for repeat PET/CT in 3 months.  She will need premedication for this due to known contrast allergy.    2.  Schizoaffective disorder, bipolar type: She is on treatment with Zoloft and Zyprexa.  Ongoing follow up with Dr. Tyson.  She has increased anxiety around scans.    3.  Bone metastases/back pain:  She is s/p XRT to the lumbar spine and kyphoplasty of L4.  She is taking MS contin 15 mg PO BID and oxycodone and ibu profen prn.  She also takes methocarbamol prn muscle spasms. Pain regimen is being managed by palliative medicine.     On Zometa since 1/18/2021 and is receiving once every 3 months. Was on hold due to dental work. She had dental extractions end of January, will plan to resume Zometa end of April.     4.  DVT:  She confirms that she has continued on Xarelto.  Recommend continuing Xarelto until contraindicated given metastatic disease.      5. Housing instability: She received resources our  sent. She is working with the [facility] for a CADI waiver. It appears she can live with her cousin longer rather it is her desire to leave driving the move. We agreed together living with her cousin for now was probably a safer option for her than a homeless shelter.     Greater than 40 minutes was spent with this patient with  greater than 20 minutes spent in counseling and coordination of care.    RICHARD Kim PA-C

## 2022-03-14 NOTE — NURSING NOTE
Oncology Rooming Note    March 14, 2022 3:15 PM   Teresa Sanderson is a 46 year old female who presents for:    Chief Complaint   Patient presents with     Labs Only     venipuncture, vitals checked     Oncology Clinic Visit     breast cancer     Initial Vitals: /85   Pulse 90   Temp 98.9  F (37.2  C)   Resp 18   Wt 109.8 kg (242 lb)   SpO2 98%   BMI 32.82 kg/m   Estimated body mass index is 32.82 kg/m  as calculated from the following:    Height as of 11/26/21: 1.829 m (6').    Weight as of this encounter: 109.8 kg (242 lb). Body surface area is 2.36 meters squared.  Severe Pain (7) Comment: Data Unavailable   No LMP recorded. (Menstrual status: Tubal ).  Allergies reviewed: Yes  Medications reviewed: Yes    Medications: MEDICATION REFILLS NEEDED TODAY. Provider was notified. Methocarbamol needs refill    Pharmacy name entered into EPIC:    [facility] - [place], [place] - [address] [facility] [address]  [id number] - [place], [place] - [address] [facility] [address] [facility] [address]  [facility] - [place], [place] - [address]  [facility] - [place], [place] - [address]  [facility] - [place], [place] - [address]    Clinical concerns: none       Tootie Oshea CMA

## 2022-03-27 ENCOUNTER — HEALTH MAINTENANCE LETTER (OUTPATIENT)
Age: 47
End: 2022-03-27

## 2022-04-11 ENCOUNTER — TELEPHONE (OUTPATIENT)
Dept: PSYCHIATRY | Facility: CLINIC | Age: 47
End: 2022-04-11
Payer: COMMERCIAL

## 2022-04-11 NOTE — TELEPHONE ENCOUNTER
Attempted to reach pt via phone for check-in. No answer; LVM requesting a c/b at pt's convenience.

## 2022-04-11 NOTE — TELEPHONE ENCOUNTER
----- Message -----  From: Luz Batista  Sent: 4/11/2022   4:06 PM CDT  To: Psychiatry Nurse-[facility]  Subject: scheduling                                       4/11/22 I spoke to the pt about changing her appt. Time from 11:30 to 9:00 on 4/13/22 per  request. Patient stated that she would have to reschedule her appt. Due to her son passing away and she is out of the state. But she would most definitely need to speak with Dr. Tyson.

## 2022-04-12 ENCOUNTER — DOCUMENTATION ONLY (OUTPATIENT)
Dept: PSYCHIATRY | Facility: CLINIC | Age: 47
End: 2022-04-12
Payer: COMMERCIAL

## 2022-04-13 NOTE — PROGRESS NOTES
[facility] Provider Note    Pt cancelled her appt for tomorrow the 13th d/t needing to go out of state  because of the death of her son. We will offer an appt on May 10 or 11th and will try to connect with her over the next few days to offer support.  MD Derrell

## 2022-04-13 NOTE — TELEPHONE ENCOUNTER
"Writer placed a call to patient to connect after receiving a message that her son passed away. Patient is currently out of town where her son use to live. She is living with her grandmother who is supportive. She has support from friends and her other kids as well. Patient shared \" I am holding up fine but my heart hurts.\" Shared her son was her biggest supporter. She plans to return to [place] on 4/18. Patient denies any other concerns at this time. She agreed to schedule an appt with Dr. Tyson on 5/10 at 2:30 pm. She also requested to be placed on a cancellation list. Educated patient to call the clinic if symptoms worsen. Also updated her to come I the ED if she feels unsafe. She verbalized understanding.      Provider and scheduling notified   "

## 2022-04-22 ENCOUNTER — TELEPHONE (OUTPATIENT)
Dept: PSYCHIATRY | Facility: CLINIC | Age: 47
End: 2022-04-22
Payer: COMMERCIAL

## 2022-04-22 NOTE — TELEPHONE ENCOUNTER
Made attempt to reach pt to check-in given missed appointment and recent family stressors. No answer; LVM with writer's c/b information.

## 2022-05-13 ENCOUNTER — INFUSION THERAPY VISIT (OUTPATIENT)
Dept: ONCOLOGY | Facility: CLINIC | Age: 47
End: 2022-05-13
Attending: PHYSICIAN ASSISTANT
Payer: COMMERCIAL

## 2022-05-13 ENCOUNTER — APPOINTMENT (OUTPATIENT)
Dept: LAB | Facility: CLINIC | Age: 47
End: 2022-05-13
Attending: PHYSICIAN ASSISTANT
Payer: COMMERCIAL

## 2022-05-13 VITALS
SYSTOLIC BLOOD PRESSURE: 127 MMHG | WEIGHT: 244.4 LBS | HEART RATE: 74 BPM | DIASTOLIC BLOOD PRESSURE: 87 MMHG | BODY MASS INDEX: 33.15 KG/M2 | OXYGEN SATURATION: 98 % | TEMPERATURE: 98 F | RESPIRATION RATE: 16 BRPM

## 2022-05-13 DIAGNOSIS — C50.812 MALIGNANT NEOPLASM OF OVERLAPPING SITES OF LEFT BREAST IN FEMALE, ESTROGEN RECEPTOR POSITIVE (H): ICD-10-CM

## 2022-05-13 DIAGNOSIS — C50.912 CARCINOMA OF LEFT BREAST METASTATIC TO BONE (H): Primary | ICD-10-CM

## 2022-05-13 DIAGNOSIS — Z71.6 ENCOUNTER FOR SMOKING CESSATION COUNSELING: ICD-10-CM

## 2022-05-13 DIAGNOSIS — F29 PSYCHOSIS, UNSPECIFIED PSYCHOSIS TYPE (H): ICD-10-CM

## 2022-05-13 DIAGNOSIS — C50.912 CARCINOMA OF LEFT BREAST METASTATIC TO BONE (H): ICD-10-CM

## 2022-05-13 DIAGNOSIS — Z17.0 MALIGNANT NEOPLASM OF OVERLAPPING SITES OF LEFT BREAST IN FEMALE, ESTROGEN RECEPTOR POSITIVE (H): ICD-10-CM

## 2022-05-13 DIAGNOSIS — T40.2X5A THERAPEUTIC OPIOID INDUCED CONSTIPATION: ICD-10-CM

## 2022-05-13 DIAGNOSIS — C79.51 CARCINOMA OF LEFT BREAST METASTATIC TO BONE (H): Primary | ICD-10-CM

## 2022-05-13 DIAGNOSIS — C79.51 CARCINOMA OF LEFT BREAST METASTATIC TO BONE (H): ICD-10-CM

## 2022-05-13 DIAGNOSIS — I82.432 ACUTE DEEP VEIN THROMBOSIS (DVT) OF POPLITEAL VEIN OF LEFT LOWER EXTREMITY (H): Primary | ICD-10-CM

## 2022-05-13 DIAGNOSIS — B35.3 TINEA PEDIS OF BOTH FEET: ICD-10-CM

## 2022-05-13 DIAGNOSIS — G89.3 CANCER ASSOCIATED PAIN: ICD-10-CM

## 2022-05-13 DIAGNOSIS — Z91.041 ALLERGIC TO IV CONTRAST: ICD-10-CM

## 2022-05-13 DIAGNOSIS — K21.9 GASTROESOPHAGEAL REFLUX DISEASE WITHOUT ESOPHAGITIS: ICD-10-CM

## 2022-05-13 DIAGNOSIS — K59.03 THERAPEUTIC OPIOID INDUCED CONSTIPATION: ICD-10-CM

## 2022-05-13 PROCEDURE — 258N000003 HC RX IP 258 OP 636: Performed by: PHYSICIAN ASSISTANT

## 2022-05-13 PROCEDURE — 250N000011 HC RX IP 250 OP 636: Performed by: PHYSICIAN ASSISTANT

## 2022-05-13 PROCEDURE — 96374 THER/PROPH/DIAG INJ IV PUSH: CPT

## 2022-05-13 PROCEDURE — G0463 HOSPITAL OUTPT CLINIC VISIT: HCPCS

## 2022-05-13 PROCEDURE — 99215 OFFICE O/P EST HI 40 MIN: CPT | Performed by: PHYSICIAN ASSISTANT

## 2022-05-13 RX ORDER — PANTOPRAZOLE SODIUM 40 MG/1
40 TABLET, DELAYED RELEASE ORAL
Qty: 30 TABLET | Refills: 1 | Status: SHIPPED | OUTPATIENT
Start: 2022-05-13 | End: 2022-08-16

## 2022-05-13 RX ORDER — OLANZAPINE 2.5 MG/1
TABLET, FILM COATED ORAL
Qty: 90 TABLET | Refills: 2 | Status: SHIPPED | OUTPATIENT
Start: 2022-05-13 | End: 2022-06-17

## 2022-05-13 RX ORDER — OXYCODONE HYDROCHLORIDE 5 MG/1
5-10 TABLET ORAL EVERY 6 HOURS PRN
Qty: 90 TABLET | Refills: 0 | Status: SHIPPED | OUTPATIENT
Start: 2022-05-13 | End: 2022-06-14

## 2022-05-13 RX ORDER — THERMOMETER, ELECTRONIC,ORAL
EACH MISCELLANEOUS 2 TIMES DAILY
Qty: 30 G | Refills: 1 | Status: SHIPPED | OUTPATIENT
Start: 2022-05-13 | End: 2022-08-16

## 2022-05-13 RX ORDER — VITAMIN B COMPLEX
25 TABLET ORAL DAILY
Qty: 90 TABLET | Refills: 3 | Status: SHIPPED | OUTPATIENT
Start: 2022-05-13

## 2022-05-13 RX ORDER — SENNA AND DOCUSATE SODIUM 50; 8.6 MG/1; MG/1
2 TABLET, FILM COATED ORAL 2 TIMES DAILY PRN
Qty: 100 TABLET | Refills: 3 | Status: SHIPPED | OUTPATIENT
Start: 2022-05-13 | End: 2023-05-18

## 2022-05-13 RX ORDER — METHYLPREDNISOLONE 32 MG/1
TABLET ORAL
Qty: 2 TABLET | Refills: 0 | Status: SHIPPED | OUTPATIENT
Start: 2022-05-13 | End: 2022-06-14

## 2022-05-13 RX ORDER — HEPARIN SODIUM (PORCINE) LOCK FLUSH IV SOLN 100 UNIT/ML 100 UNIT/ML
5 SOLUTION INTRAVENOUS
Status: CANCELLED | OUTPATIENT
Start: 2022-05-13

## 2022-05-13 RX ORDER — ANASTROZOLE 1 MG/1
1 TABLET ORAL DAILY
Qty: 90 TABLET | Refills: 3 | Status: SHIPPED | OUTPATIENT
Start: 2022-05-13 | End: 2022-05-23

## 2022-05-13 RX ORDER — SERTRALINE HYDROCHLORIDE 100 MG/1
TABLET, FILM COATED ORAL
Qty: 30 TABLET | Refills: 2 | Status: SHIPPED | OUTPATIENT
Start: 2022-05-13 | End: 2022-08-16

## 2022-05-13 RX ORDER — MORPHINE SULFATE 15 MG/1
15 TABLET, FILM COATED, EXTENDED RELEASE ORAL EVERY 12 HOURS
Qty: 60 TABLET | Refills: 0 | Status: SHIPPED | OUTPATIENT
Start: 2022-05-13 | End: 2022-06-14

## 2022-05-13 RX ORDER — HEPARIN SODIUM,PORCINE 10 UNIT/ML
5 VIAL (ML) INTRAVENOUS
Status: CANCELLED | OUTPATIENT
Start: 2022-05-13

## 2022-05-13 RX ORDER — ZOLEDRONIC ACID 0.04 MG/ML
4 INJECTION, SOLUTION INTRAVENOUS ONCE
Status: COMPLETED | OUTPATIENT
Start: 2022-05-13 | End: 2022-05-13

## 2022-05-13 RX ORDER — ZOLEDRONIC ACID 0.04 MG/ML
4 INJECTION, SOLUTION INTRAVENOUS ONCE
Status: CANCELLED | OUTPATIENT
Start: 2022-05-13

## 2022-05-13 RX ADMIN — ZOLEDRONIC ACID 4 MG: 0.04 INJECTION, SOLUTION INTRAVENOUS at 08:35

## 2022-05-13 RX ADMIN — SODIUM CHLORIDE 250 ML: 9 INJECTION, SOLUTION INTRAVENOUS at 08:35

## 2022-05-13 ASSESSMENT — PAIN SCALES - GENERAL: PAINLEVEL: SEVERE PAIN (7)

## 2022-05-13 NOTE — PROGRESS NOTES
Infusion Nursing Note:  Teresa Sanderson presents today for Zometa.    Patient seen by provider today: Yes: HAYDE Mcdowell   present during visit today: Not Applicable.    Note: Pt presents to infusion feeling well. She offers no new concerns since her provider appointment this morning.    She has had some dental work done over the past few months in preparation for Zometa, and looking forward to finally restarting today. She has no further dental concerns and will be getting dentures in June.    K+ low at 3.3 today:   - RN encouraged potassium rich foods, sent pt home with list  - No need to replace K+ per protocol today pt has good oral/food intake. Encourage K rich foods.     Pt advised to purchase OTC Calcium supplement and begin taking this along with Vitamin D today.      Intravenous Access:  Peripheral IV placed.    Treatment Conditions:  Lab Results   Component Value Date    HGB 10.9 (L) 05/13/2022    WBC 3.7 (L) 05/13/2022    ANEU 1.8 05/13/2022    ANEUTAUTO 3.9 02/14/2022     05/13/2022      Lab Results   Component Value Date     05/13/2022    POTASSIUM 3.3 (L) 05/13/2022    CR 0.70 05/13/2022    NANDO 9.1 05/13/2022    BILITOTAL 0.3 05/13/2022    ALBUMIN 3.6 05/13/2022    ALT 21 05/13/2022    AST 16 05/13/2022     Results reviewed, labs MET treatment parameters, ok to proceed with treatment.      Post Infusion Assessment:  Patient tolerated infusion without incident.  Blood return noted pre and post infusion.  Site patent and intact, free from redness, edema or discomfort.  No evidence of extravasations.  Access discontinued per protocol.       Discharge Plan:   Patient declined prescription refills. Will be picking up several meds today at [facility].  Discharge instructions reviewed with: Patient.  Patient and/or family verbalized understanding of discharge instructions and all questions answered.  Copy of AVS reviewed with patient and/or family.  Patient will return in 4 weeks  for next appointment per check out orders, pt aware to watch MyChart/VM.  Patient discharged in stable condition accompanied by: self.  Departure Mode: Ambulatory.      Sade Parekh RN

## 2022-05-13 NOTE — LETTER
5/13/2022         RE: Treesa Sanderson  [address] [facility] [address]      Oncology Visit:   Date on this visit: May 13, 2022    Diagnosis:  ER positive left breast cancer metastasized to bones.     Primary Physician: No Ref-Primary, Physician     History Of Present Illness:    Ms. Sanderson is a 46 year old female with a h/o tobacco abuse and DVTs with left breast cancer metastasized to bone. She presented to [place] ED with back pain on 12/5/2020. MRI of the L-spine showed an abnormal L4 lesion with associated right paraspinal mass, abnormalities in L5 and the left iliac bone were also seen. CT C/A/P showed a left breast mass, lytic lesions of T7, L4, and the pelvis, and a 3 cm lesion in the kidney (thought to be a cyst). Ultrasound of the bilateral lower extremities showed a non-occlusive thrombus in the left popliteal vein. Mammogram and ultrasound of the bilateral breasts on 12/17/2020 showed a spiculated mass measuring at least 7.8 cm at 12-1:00 left breast extending from the nipple to 9 cm from the nipple with associated nipple retraction. This mass was biopsied, and showed IDC with surrounding DCIS, grade 3, ER+ 90%, and CT+ 75%.  HER2 was equivocal in approximately 35% of tumor cells by FISH and was negative by IHC.    Metastatic Breast Cancer Treatment:  12/23/2021 - 1/7/2021  Radiation (3000 cGy) to the lumbar spine.  1/29/2021 - present  Ibrance, zoladex, and anastrozole.  5/17/2021 radiofrequency ablation, kyphoplasty to L4  8/20/2021  Bilateral salpingo-oophorectomies, Ibrance and anastrozole    Interval History:     Teresa has been having a difficult past couple of months due to the death of her son. She has been off all of her medications for the past ~2 months with the exception of Ibrance. Her son lived in the [facility] area and was a big support for her. Her mood has been greatly affected by this loss.At times, she didn't feel like living but reports she now no longer feels this  way. She reports a wave of emotions, sleeping more, and memory issues. Denies sucidal and homicidal ideation. Feels safe to be at home. Eating and drinking well. Denies self harm. Her friends took her on a trip to [place] which helps her spirits a bit. Additionally, she states that her son had a child on the way and she has been connecting with the child's mother. She feels motivated to reconnect with her entire care team and her medications.     Teresa continues to live with her cousin but hopes to try to figure out a different living situation due to lifestyle differences. She continues to want to find a new place to live due to many people coming and going. Reports she met with the [facility] and did not understand the CADI waiver process.     She has been not taking any of her psych medications, sertraline or olanzapine. Missed appointments with Dr. Tyson due to life stressors with the death of her son. Desires to restart medications today.     Endorses a new dry cough and dry mouth. Also recently started smoking, 1 ppd, and noticed it around the same time.No fevers or chills. Negative home Covid test. No chest pain or shortness of breath.     Reports eating and drinking well. She had all her upper teeth pulled the end of January. Has a dental appointment in a 5/21/22 for dentures. Reports she was given the okay to continue Zometa.     Chronic cancer related pain. Main sites include RT leg pain and back pain. Again, has not attended palliative care appointments due to life challenges. Reports she has been out of all pain meds for the past month. At times, she has taken other peoples pain medications due to not having her prescribed medication. She previously was taking Morphine 15 mg BID. Oxycodone 6 times per day. Continues to take tylenol and ibuprofen daily.     Endorses blurry vision has never been to the eye doctor.     No abdominal pain. No bowel changes. No changes in urination.  No fevers or recent  infections. No cough or SOB. No new or different pain. Her low back pain is controlled with pain medications. She is managing constipation with miralax and sometimes Senna. No new skin changes or lumps/bumps.       Past Medical/Surgical History:   Past Medical History:   Diagnosis Date     Anxiety      Breast CA (H) 12/2020     Depression      DVT (deep venous thrombosis) (H) 2014     Left breast mass     x approximately 4-5 months     Pulmonary emboli (H)      Pyelonephritis      Schizoaffective disorder (H)      Tobacco use      Past Surgical History:   Procedure Laterality Date     IR LUMBAR KYPHOPLASTY VERTEBRAE  5/17/2021     LAPAROSCOPIC SALPINGO-OOPHORECTOMY Bilateral 8/20/2021    Procedure: BILATERAL SALPINGO-OOPHORECTOMY, LAPAROSCOPIC;  Surgeon: Rory Lopez MD;  Location: [facility] OR     TUBAL LIGATION  1998        Allergies   Allergen Reactions     Contrast Dye      Pt developed nausea after isovue 370 injection on 6/9/21        Current Outpatient Medications   Medication     anastrozole (ARIMIDEX) 1 MG tablet     benztropine (COGENTIN) 0.5 MG tablet     diclofenac (VOLTAREN) 1 % topical gel     gabapentin (NEURONTIN) 300 MG capsule     ibuprofen (ADVIL/MOTRIN) 800 MG tablet     methocarbamol (ROBAXIN) 500 MG tablet     methylPREDNISolone (MEDROL) 32 MG tablet     morphine (MS CONTIN) 15 MG CR tablet     naloxone (NARCAN) 4 MG/0.1ML nasal spray     nicotine (NICORETTE) 2 MG gum     OLANZapine (ZYPREXA) 2.5 MG tablet     ondansetron (ZOFRAN-ODT) 4 MG ODT tab     oxyCODONE (ROXICODONE) 5 MG tablet     palbociclib (IBRANCE) 125 MG tablet     pantoprazole (PROTONIX) 40 MG EC tablet     polyethylene glycol (MIRALAX) 17 GM/Dose powder     prochlorperazine (COMPAZINE) 10 MG tablet     rivaroxaban ANTICOAGULANT (XARELTO) 20 MG TABS tablet     SENNA-docusate sodium (SENNA S) 8.6-50 MG tablet     sennosides (SENOKOT) 8.6 MG tablet     sertraline (ZOLOFT) 100 MG tablet     tolnaftate (TINACTIN) 1 % external  cream     Vitamin D3 (CHOLECALCIFEROL) 25 mcg (1000 units) tablet     No current facility-administered medications for this visit.   '  Physical Exam:   /87   Pulse 74   Temp 98  F (36.7  C) (Oral)   Resp 16   Wt 110.9 kg (244 lb 6.4 oz)   SpO2 98%   BMI 33.15 kg/m     Wt Readings from Last 4 Encounters:   05/13/22 110.9 kg (244 lb 6.4 oz)   03/14/22 109.8 kg (242 lb)   02/14/22 108.4 kg (239 lb)   11/30/21 110.7 kg (244 lb)     General:  At times tearful appearing adult female in NAD.  Alert and oriented.  HEENT:  Normocephalic.  Sclera anicteric.  MMM.  No lesions of the oropharynx.  Has had all of her upper teeth pulled; no dentures as of yet.  Lymph:  No palpable cervical, supraclavicular, or axillary LAD.  Chest:  CTA bilaterally.  No wheezes or crackles.  CV:  RRR.  Nl S1 and S2.    Abd:  Soft/NT/ND.  BS normoactive.    Ext:  No pitting edema of the bilateral lower extremities.    Musculo:  Good movement of all 4 extremities.  Neuro:  Cranial nerves grossly intact.  Gait is stable.  Psych:  Down mood towards the start of the appointment and more bright towards the end. At times tearful. Denies suicidal or homicidal ideation.     Laboratory/Imaging Studies:   Most Recent 3 CBC's:Recent Labs   Lab Test 05/13/22  0700 03/14/22  1448 02/14/22  1524 12/28/21  0943 11/23/21  1101 10/12/21  0940   WBC 3.7* 3.5* 6.3 3.9*   < > 3.6*   HGB 10.9* 11.2* 12.1 11.6*   < > 11.6*   MCV 95 96 94 93   < > 93    174 253 218   < > 294   ANEUTAUTO  --   --  3.9 2.6  --  2.1    < > = values in this interval not displayed.     Most Recent 3 BMP's:  Recent Labs   Lab Test 05/13/22  0700 03/14/22  1448 02/14/22  1524    141 141   POTASSIUM 3.3* 4.0 3.4   CHLORIDE 107 109 109   CO2 28 26 23   BUN 7 14 9   CR 0.70 0.86 0.62   ANIONGAP 9 6 9   NANDO 9.1 9.2 9.4   * 130* 152*   PROTTOTAL 7.5 7.5 7.7   ALBUMIN 3.6 3.6 3.5    Most Recent 3 LFT's:  Recent Labs   Lab Test 05/13/22  0700 03/14/22  1571  02/14/22  1524   AST 16 18 14   ALT 21 21 20   ALKPHOS 123 122 128   BILITOTAL 0.3 0.2 0.2    Most Recent 2 TSH and T4:  Recent Labs   Lab Test 11/27/21  0752 09/13/21  1242   TSH 1.17 0.18*   T4  --  1.14     I reviewed the above labs today.      ASSESSMENT/PLAN:   46 year old female with history of DVT and left breast invasive ductal carcinoma, ER/PA positive, HER2 negative metastasized to bones.    1.  Metastatic breast cancer: She has been consistently taking anastrozole now for the past month and is taking ibrance consistently as well. Her tumor markers pending.   - Receives PET/CT every 6 months, next due around 6/03. She will need premedication for this due to known contrast allergy.Medrol prescription sent to patients pharmacy.   - RTC with Dr. Plunkett after scans.     2.  Schizoaffective disorder, bipolar type  3. Grief: son recently passed away    - She has not been taking her Zoloft and Zyprexa. Denies active suicidal ideation. Patient feels safe to be home at this time.    - Restart Zoloft and Zyprexa, prescriptions sent.   - Follow up with Dr. Tyson within the next 2 weeks.    3.  Cancer related pain/back pain:  She is s/p XRT to the lumbar spine and kyphoplasty of L4.  In the past, she has been taking MS contin 15 mg PO BID and oxycodone six times per day and ibu profen prn.  She also takes methocarbamol prn muscle spasms. Pain regimen is being managed by palliative medicine.   - Patient has been out of all medications. Prescriptions sent to bridge patient until palliative care appointment is scheduled.   - Follow up with palliative care within the next 2 weeks.     4. Bone Mets  - Started Zometa 1/18/21 q 3 months. Has recently been on hold due to dental work. Her dental work is completed and she will be getting her dentures on 5/21/22. Ok to proceed with Zometa today.    5.  DVT:  She has not been taking Xarelto.  Recommend continuing Xarelto until contraindicated given metastatic disease.    -  Restart Xarelto, prescription sent.     6. Smoking cessation  - Patient interested in quitting smoking. Has used gum in the past with success.   - Nicorette gum prescription sent.     7. Housing instability: She received resources our SW sent. She continues to navigate housing challenges. Living with cousin. Pt reports CADI waiver likely won't work out.     8. Acid reflux  - Previously well managed with omeprazole. Resent prescription.     9. Constipation   - Continue Senna and Miralax prn.     11. Low potassium  - Continue to monitor. Continue with eating and drinking well.     12. Poor vision  - Recommend establishing with an optometrist in network with insurance.      60 minutes spent on the date of the encounter doing chart review, review of test results, interpretation of tests, patient visit and documentation       RICHARD Rouse PA-C

## 2022-05-13 NOTE — NURSING NOTE
Oncology Rooming Note    May 13, 2022 7:06 AM   Teresa Sanderson is a 46 year old female who presents for:    Chief Complaint   Patient presents with     Oncology Clinic Visit     Breast cancer     Blood Draw     Labs drawn via piv by RN in lab.  VS taken     Initial Vitals: /87   Pulse 74   Temp 98  F (36.7  C) (Oral)   Resp 16   Wt 110.9 kg (244 lb 6.4 oz)   SpO2 98%   BMI 33.15 kg/m   Estimated body mass index is 33.15 kg/m  as calculated from the following:    Height as of 11/26/21: 1.829 m (6').    Weight as of this encounter: 110.9 kg (244 lb 6.4 oz). Body surface area is 2.37 meters squared.  Severe Pain (7) Comment: Data Unavailable   No LMP recorded. (Menstrual status: Tubal ).  Allergies reviewed: Yes  Medications reviewed: Yes    Medications: MEDICATION REFILLS NEEDED TODAY. Provider was notified. Needs all meds refilled    Pharmacy name entered into EPIC:    [facility] - [place], [place] - [address] [facility]  [id number] - [place], [place] - [address] [facility] [address]  [facility] - [place], [place] - [address] [facility]  [facility] - [place], [place] - [address]  [facility] - [place], [place] - [address] [facility]    Clinical concerns: none       Tootie Oshea CMA

## 2022-05-13 NOTE — PATIENT INSTRUCTIONS
[facility] Triage and after hours / weekends / holidays:  [phone]    Please call the triage or after hours line if you experience a temperature greater than or equal to 100.4, shaking chills, have uncontrolled nausea, vomiting and/or diarrhea, dizziness, shortness of breath, chest pain, bleeding, unexplained bruising, or if you have any other new/concerning symptoms, questions or concerns.      If you are having any concerning symptoms or wish to speak to a provider before your next infusion visit, please call your care coordinator or triage to notify them so we can adequately serve you.     If you need a refill on a narcotic prescription or other medication, please call before your infusion appointment.        your refills at [facility] today.   Also  an over the counter Calcium supplement. Millicent recommends 500-600mg twice daily (one pill in the AM, one in the PM). This and Vitamin D are important to take while on your bone strengthener medication (Zometa).    You will have a PET scan and follow up appointment with Dr. Plunkett in one month. Keep an eye on MyChart or a phone call for when this gets scheduled.    Some ideas for Potassium rich foods: leafy greens (spinach, broccoli), bananas, beets or electrolyte drinks (Gatorade, etc).          Lab Results:  Recent Results (from the past 12 hour(s))   Comprehensive metabolic panel    Collection Time: 05/13/22  7:00 AM   Result Value Ref Range    Sodium 144 133 - 144 mmol/L    Potassium 3.3 (L) 3.4 - 5.3 mmol/L    Chloride 107 94 - 109 mmol/L    Carbon Dioxide (CO2) 28 20 - 32 mmol/L    Anion Gap 9 3 - 14 mmol/L    Urea Nitrogen 7 7 - 30 mg/dL    Creatinine 0.70 0.52 - 1.04 mg/dL    Calcium 9.1 8.5 - 10.1 mg/dL    Glucose 118 (H) 70 - 99 mg/dL    Alkaline Phosphatase 123 40 - 150 U/L    AST 16 0 - 45 U/L    ALT 21 0 - 50 U/L    Protein Total 7.5 6.8 - 8.8 g/dL    Albumin 3.6 3.4 - 5.0 g/dL    Bilirubin Total 0.3 0.2 - 1.3 mg/dL    GFR Estimate >90 >60  mL/min/1.73m2   CBC with platelets and differential    Collection Time: 05/13/22  7:00 AM   Result Value Ref Range    WBC Count 3.7 (L) 4.0 - 11.0 10e3/uL    RBC Count 3.45 (L) 3.80 - 5.20 10e6/uL    Hemoglobin 10.9 (L) 11.7 - 15.7 g/dL    Hematocrit 32.7 (L) 35.0 - 47.0 %    MCV 95 78 - 100 fL    MCH 31.6 26.5 - 33.0 pg    MCHC 33.3 31.5 - 36.5 g/dL    RDW 15.4 (H) 10.0 - 15.0 %    Platelet Count 173 150 - 450 10e3/uL   Manual Differential    Collection Time: 05/13/22  7:00 AM   Result Value Ref Range    % Neutrophils 49 %    % Lymphocytes 44 %    % Monocytes 6 %    % Eosinophils 1 %    % Basophils 0 %    Absolute Neutrophils 1.8 1.6 - 8.3 10e3/uL    Absolute Lymphocytes 1.6 0.8 - 5.3 10e3/uL    Absolute Monocytes 0.2 0.0 - 1.3 10e3/uL    Absolute Eosinophils 0.0 0.0 - 0.7 10e3/uL    Absolute Basophils 0.0 0.0 - 0.2 10e3/uL    RBC Morphology Confirmed RBC Indices     Platelet Assessment  Automated Count Confirmed. Platelet morphology is normal.     Automated Count Confirmed. Platelet morphology is normal.

## 2022-05-13 NOTE — NURSING NOTE
Chief Complaint   Patient presents with     Oncology Clinic Visit     Breast cancer     Blood Draw     Labs drawn via piv by RN in lab.  VS taken       Labs drawn from PIV placed by RN. Line flushed with saline. Vitals taken. Pt checked in for appointment(s).    Yissel Quiñones RN

## 2022-05-13 NOTE — PROGRESS NOTES
Oncology Visit:   Date on this visit: May 13, 2022    Diagnosis:  ER positive left breast cancer metastasized to bones.     Primary Physician: No Ref-Primary, Physician     History Of Present Illness:    Ms. Sanderson is a 46 year old female with a h/o tobacco abuse and DVTs with left breast cancer metastasized to bone. She presented to [facility] ED with back pain on 12/5/2020. MRI of the L-spine showed an abnormal L4 lesion with associated right paraspinal mass, abnormalities in L5 and the left iliac bone were also seen. CT C/A/P showed a left breast mass, lytic lesions of T7, L4, and the pelvis, and a 3 cm lesion in the kidney (thought to be a cyst). Ultrasound of the bilateral lower extremities showed a non-occlusive thrombus in the left popliteal vein. Mammogram and ultrasound of the bilateral breasts on 12/17/2020 showed a spiculated mass measuring at least 7.8 cm at 12-1:00 left breast extending from the nipple to 9 cm from the nipple with associated nipple retraction. This mass was biopsied, and showed IDC with surrounding DCIS, grade 3, ER+ 90%, and NH+ 75%.  HER2 was equivocal in approximately 35% of tumor cells by FISH and was negative by IHC.    Metastatic Breast Cancer Treatment:  12/23/2021 - 1/7/2021  Radiation (3000 cGy) to the lumbar spine.  1/29/2021 - present  Ibrance, zoladex, and anastrozole.  5/17/2021 radiofrequency ablation, kyphoplasty to L4  8/20/2021  Bilateral salpingo-oophorectomies, Ibrance and anastrozole    Interval History:     Teresa has been having a difficult past couple of months due to the death of her son. She has been off all of her medications for the past ~2 months with the exception of Ibrance. Her son lived in the [facility] area and was a big support for her. Her mood has been greatly affected by this loss.At times, she didn't feel like living but reports she now no longer feels this way. She reports a wave of emotions, sleeping more, and memory issues. Denies sucidal and  homicidal ideation. Feels safe to be at home. Eating and drinking well. Denies self harm. Her friends took her on a trip to [place] which helps her spirits a bit. Additionally, she states that her son had a child on the way and she has been connecting with the child's mother. She feels motivated to reconnect with her entire care team and her medications.     Teresa continues to live with her cousin but hopes to try to figure out a different living situation due to lifestyle differences. She continues to want to find a new place to live due to many people coming and going. Reports [facility] and did not understand the CADI waiver process.     She has been not taking any of her psych medications, sertraline or olanzapine. Missed appointments with Dr. Tyson due to life stressors with the death of her son. Desires to restart medications today.     Endorses a new dry cough and dry mouth. Also recently started smoking, 1 ppd, and noticed it around the same time.No fevers or chills. Negative home Covid test. No chest pain or shortness of breath.     Reports eating and drinking well. She had all her upper teeth pulled the end of January. Has a dental appointment in a 5/21/22 for dentures. Reports she was given the okay to continue Zometa.     Chronic cancer related pain. Main sites include RT leg pain and back pain. Again, has not attended palliative care appointments due to life challenges. Reports she has been out of all pain meds for the past month. At times, she has taken other peoples pain medications due to not having her prescribed medication. She previously was taking Morphine 15 mg BID. Oxycodone 6 times per day. Continues to take tylenol and ibuprofen daily.     Endorses blurry vision has never been to the eye doctor.     No abdominal pain. No bowel changes. No changes in urination.  No fevers or recent infections. No cough or SOB. No new or different pain. Her low back pain is controlled with pain  medications. She is managing constipation with miralax and sometimes Senna. No new skin changes or lumps/bumps.       Past Medical/Surgical History:   Past Medical History:   Diagnosis Date     Anxiety      Breast CA (H) 12/2020     Depression      DVT (deep venous thrombosis) (H) 2014     Left breast mass     x approximately 4-5 months     Pulmonary emboli (H)      Pyelonephritis      Schizoaffective disorder (H)      Tobacco use      Past Surgical History:   Procedure Laterality Date     IR LUMBAR KYPHOPLASTY VERTEBRAE  5/17/2021     LAPAROSCOPIC SALPINGO-OOPHORECTOMY Bilateral 8/20/2021    Procedure: BILATERAL SALPINGO-OOPHORECTOMY, LAPAROSCOPIC;  Surgeon: Rory Lopez MD;  Location: [facility] OR     TUBAL LIGATION  1998        Allergies   Allergen Reactions     Contrast Dye      Pt developed nausea after isovue 370 injection on 6/9/21        Current Outpatient Medications   Medication     anastrozole (ARIMIDEX) 1 MG tablet     benztropine (COGENTIN) 0.5 MG tablet     diclofenac (VOLTAREN) 1 % topical gel     gabapentin (NEURONTIN) 300 MG capsule     ibuprofen (ADVIL/MOTRIN) 800 MG tablet     methocarbamol (ROBAXIN) 500 MG tablet     methylPREDNISolone (MEDROL) 32 MG tablet     morphine (MS CONTIN) 15 MG CR tablet     naloxone (NARCAN) 4 MG/0.1ML nasal spray     nicotine (NICORETTE) 2 MG gum     OLANZapine (ZYPREXA) 2.5 MG tablet     ondansetron (ZOFRAN-ODT) 4 MG ODT tab     oxyCODONE (ROXICODONE) 5 MG tablet     palbociclib (IBRANCE) 125 MG tablet     pantoprazole (PROTONIX) 40 MG EC tablet     polyethylene glycol (MIRALAX) 17 GM/Dose powder     prochlorperazine (COMPAZINE) 10 MG tablet     rivaroxaban ANTICOAGULANT (XARELTO) 20 MG TABS tablet     SENNA-docusate sodium (SENNA S) 8.6-50 MG tablet     sennosides (SENOKOT) 8.6 MG tablet     sertraline (ZOLOFT) 100 MG tablet     tolnaftate (TINACTIN) 1 % external cream     Vitamin D3 (CHOLECALCIFEROL) 25 mcg (1000 units) tablet     No current  facility-administered medications for this visit.   '  Physical Exam:   /87   Pulse 74   Temp 98  F (36.7  C) (Oral)   Resp 16   Wt 110.9 kg (244 lb 6.4 oz)   SpO2 98%   BMI 33.15 kg/m     Wt Readings from Last 4 Encounters:   05/13/22 110.9 kg (244 lb 6.4 oz)   03/14/22 109.8 kg (242 lb)   02/14/22 108.4 kg (239 lb)   11/30/21 110.7 kg (244 lb)     General:  At times tearful appearing adult female in NAD.  Alert and oriented.  HEENT:  Normocephalic.  Sclera anicteric.  MMM.  No lesions of the oropharynx.  Has had all of her upper teeth pulled; no dentures as of yet.  Lymph:  No palpable cervical, supraclavicular, or axillary LAD.  Chest:  CTA bilaterally.  No wheezes or crackles.  CV:  RRR.  Nl S1 and S2.    Abd:  Soft/NT/ND.  BS normoactive.    Ext:  No pitting edema of the bilateral lower extremities.    Musculo:  Good movement of all 4 extremities.  Neuro:  Cranial nerves grossly intact.  Gait is stable.  Psych:  Down mood towards the start of the appointment and more bright towards the end. At times tearful. Denies suicidal or homicidal ideation.     Laboratory/Imaging Studies:   Most Recent 3 CBC's:Recent Labs   Lab Test 05/13/22  0700 03/14/22  1448 02/14/22  1524 12/28/21  0943 11/23/21  1101 10/12/21  0940   WBC 3.7* 3.5* 6.3 3.9*   < > 3.6*   HGB 10.9* 11.2* 12.1 11.6*   < > 11.6*   MCV 95 96 94 93   < > 93    174 253 218   < > 294   ANEUTAUTO  --   --  3.9 2.6  --  2.1    < > = values in this interval not displayed.     Most Recent 3 BMP's:  Recent Labs   Lab Test 05/13/22  0700 03/14/22  1448 02/14/22  1524    141 141   POTASSIUM 3.3* 4.0 3.4   CHLORIDE 107 109 109   CO2 28 26 23   BUN 7 14 9   CR 0.70 0.86 0.62   ANIONGAP 9 6 9   NANDO 9.1 9.2 9.4   * 130* 152*   PROTTOTAL 7.5 7.5 7.7   ALBUMIN 3.6 3.6 3.5    Most Recent 3 LFT's:  Recent Labs   Lab Test 05/13/22  0700 03/14/22  1448 02/14/22  1524   AST 16 18 14   ALT 21 21 20   ALKPHOS 123 122 128   BILITOTAL 0.3 0.2 0.2     Most Recent 2 TSH and T4:  Recent Labs   Lab Test 11/27/21  0752 09/13/21  1242   TSH 1.17 0.18*   T4  --  1.14     I reviewed the above labs today.      ASSESSMENT/PLAN:   46 year old female with history of DVT and left breast invasive ductal carcinoma, ER/MO positive, HER2 negative metastasized to bones.    1.  Metastatic breast cancer: She has been consistently taking anastrozole now for the past month and is taking ibrance consistently as well. Her tumor markers pending.   - Receives PET/CT every 6 months, next due around 6/03. She will need premedication for this due to known contrast allergy.Medrol prescription sent to patients pharmacy.   - RTC with Dr. Plunkett after scans.     2.  Schizoaffective disorder, bipolar type  3. Grief: son recently passed away    - She has not been taking her Zoloft and Zyprexa. Denies active suicidal ideation. Patient feels safe to be home at this time.    - Restart Zoloft and Zyprexa, prescriptions sent.   - Follow up with Dr. Tyson within the next 2 weeks.    3.  Cancer related pain/back pain:  She is s/p XRT to the lumbar spine and kyphoplasty of L4.  In the past, she has been taking MS contin 15 mg PO BID and oxycodone six times per day and ibu profen prn.  She also takes methocarbamol prn muscle spasms. Pain regimen is being managed by palliative medicine.   - Patient has been out of all medications. Prescriptions sent to bridge patient until palliative care appointment is scheduled.   - Follow up with palliative care within the next 2 weeks.     4. Bone Mets  - Started Zometa 1/18/21 q 3 months. Has recently been on hold due to dental work. Her dental work is completed and she will be getting her dentures on 5/21/22. Ok to proceed with Zometa today.    5.  DVT:  She has not been taking Xarelto.  Recommend continuing Xarelto until contraindicated given metastatic disease.    - Restart Xarelto, prescription sent.     6. Smoking cessation  - Patient interested in quitting  smoking. Has used gum in the past with success.   - Nicorette gum prescription sent.     7. Housing instability: She received resources our SW sent. She continues to navigate housing challenges. Living with cousin. Pt reports CADI waiver likely won't work out.     8. Acid reflux  - Previously well managed with omeprazole. Resent prescription.     9. Constipation   - Continue Senna and Miralax prn.     11. Low potassium  - Continue to monitor. Continue with eating and drinking well.     12. Poor vision  - Recommend establishing with an optometrist in network with insurance.      60 minutes spent on the date of the encounter doing chart review, review of test results, interpretation of tests, patient visit and documentation     Payton Rueda PA-C

## 2022-05-17 DIAGNOSIS — C50.912 CARCINOMA OF LEFT BREAST METASTATIC TO BONE (H): ICD-10-CM

## 2022-05-17 DIAGNOSIS — C79.51 CARCINOMA OF LEFT BREAST METASTATIC TO BONE (H): ICD-10-CM

## 2022-05-17 DIAGNOSIS — C50.812 MALIGNANT NEOPLASM OF OVERLAPPING SITES OF LEFT BREAST IN FEMALE, ESTROGEN RECEPTOR POSITIVE (H): ICD-10-CM

## 2022-05-17 DIAGNOSIS — Z17.0 MALIGNANT NEOPLASM OF OVERLAPPING SITES OF LEFT BREAST IN FEMALE, ESTROGEN RECEPTOR POSITIVE (H): ICD-10-CM

## 2022-05-17 RX ORDER — PALBOCICLIB 125 MG/1
TABLET, FILM COATED ORAL
Qty: 21 TABLET | Refills: 2 | OUTPATIENT
Start: 2022-05-17

## 2022-05-20 ENCOUNTER — TELEPHONE (OUTPATIENT)
Dept: PSYCHIATRY | Facility: CLINIC | Age: 47
End: 2022-05-20
Payer: COMMERCIAL

## 2022-05-20 NOTE — TELEPHONE ENCOUNTER
Case reviewed by Dr. Tyson. Obtained the following orders:  Increase HS dose of olanzapine to 7.5mg. Continue dinnertime dose of 2.5mg. TDD of olanzapine: 10mg    Conversation with Pt:  Attempted to reach pt via phone to review proposed medication change. No answer and VM inbox full. Unable to leave message. Will send [place] message.

## 2022-05-20 NOTE — TELEPHONE ENCOUNTER
"Pt was scheduled with Dr. Tyson on 5/10, but after checking in, asked to be re-scheduled. Is re-scheduled for 6/14/22.     Conversation with Patient:   -Pt struggling significantly since the recent untimely death of her son. Auditory hallucinations have been present the majority of the day x2 weeks. She reports hearing the same song on repeat all day. The only thing that helps is listening to the TV loudly. She is also hearing her son's voice calling her name and laughing. She is concerned that her son isn't at peace. Denies command AH. Reports seeing her son and/or expecting to see her son walk across the porch and enter the home.   -Sleep has worsened. Is getting 3-4 hours per night over the last 1-2 weeks. Baseline is around 6 hours. Reports difficulty falling and staying asleep. No nightmares, but frequent nighttime awakenings.   -Depression is rated at 8/10, with 10 being the worst. Is crying all day. No energy or motivation to get things done. Reports passive suicidal ideation. States, \"life ain't worth living no more\" in regards to physical/emotional pain and recent passing of son. Denies active planning or intent to take her life. Is living with her cousin who is supportive. Agrees to tell cousin or call 911 should safety concerns worsen.   -Medications: Taking Zoloft 100mg every morning and olanzapine 2.5mg with dinner AND 5mg in the evening. Reports good adherence. Denies side effects. No tremor or twitching. No refills needed at this time - medications were refill on 5/13/22 during oncology visit.   -Substance use: Using cannabis 2-3 times per week for back pain. Denies use of alcohol or other illicit substances.  "

## 2022-05-23 DIAGNOSIS — Z17.0 MALIGNANT NEOPLASM OF OVERLAPPING SITES OF LEFT BREAST IN FEMALE, ESTROGEN RECEPTOR POSITIVE (H): ICD-10-CM

## 2022-05-23 DIAGNOSIS — C50.812 MALIGNANT NEOPLASM OF OVERLAPPING SITES OF LEFT BREAST IN FEMALE, ESTROGEN RECEPTOR POSITIVE (H): ICD-10-CM

## 2022-05-23 DIAGNOSIS — C79.51 CARCINOMA OF LEFT BREAST METASTATIC TO BONE (H): Primary | ICD-10-CM

## 2022-05-23 DIAGNOSIS — C50.912 CARCINOMA OF LEFT BREAST METASTATIC TO BONE (H): Primary | ICD-10-CM

## 2022-05-23 RX ORDER — ANASTROZOLE 1 MG/1
1 TABLET ORAL DAILY
Qty: 28 TABLET | Refills: 2 | Status: SHIPPED | OUTPATIENT
Start: 2022-05-23 | End: 2022-08-16

## 2022-05-26 ENCOUNTER — TELEPHONE (OUTPATIENT)
Dept: PSYCHIATRY | Facility: CLINIC | Age: 47
End: 2022-05-26
Payer: COMMERCIAL

## 2022-05-26 NOTE — TELEPHONE ENCOUNTER
Made additional attempt to reach pt to check-in and to go over proposed olanzapine increase. No answer; LVM requesting a call back. Will make an additional attempt to reach pt tomorrow before the weekend.

## 2022-06-01 NOTE — TELEPHONE ENCOUNTER
Made additional attempt to contact pt. No answer; LVM inviting her to return writer's phone call.

## 2022-06-04 ENCOUNTER — TELEPHONE (OUTPATIENT)
Dept: ONCOLOGY | Facility: CLINIC | Age: 47
End: 2022-06-04
Payer: COMMERCIAL

## 2022-06-04 NOTE — TELEPHONE ENCOUNTER
Oral Chemotherapy Monitoring Program    Subjective/Objective:  Teresa Sanderson is a 46 year old female contacted by phone for a follow-up visit for oral chemotherapy. Teresa confirms she takes 1 talbet (125 mg) of palbociclib daily on a 3 weeks on, 1 week of schedule. She will be starting her 3rd week tonight. She states she missed 3 doses of palbociclib this cycle from being overwhelmed by recent passing of her son. She affirms she is trying to take care of herself and she will adhere to the regimen for the rest of this cycle.    She denied noticing any side effects from palbociclib, no nausea, vomiting, diarrhea, or mouth sores. She does note that her mouth seems a little dry but she is drinking water to help with it.     I mentioned to the patient she does not have any upcoming appointment with Dr. Plunkett but the 5/13 notee by Payton Rueda PA-C states she due for a provider appointment and PET/CT scan soon. Patient states she wants to get them scheduled and asked if I could schedule it for her. I confirmed I will have our scheduling team reach out to her next week to get that scheduled in June.    ORAL CHEMOTHERAPY 10/12/2021 12/23/2021 12/31/2021 2/28/2022 3/14/2022 5/23/2022 6/4/2022   Assessment Type Refill Refill Chart Review - Refill Refill Monthly Follow up   Diagnosis Code Breast Cancer Breast Cancer Breast Cancer Breast Cancer Breast Cancer Breast Cancer Breast Cancer   Providers Dr. Dimitrios Plunkett   [facility]   Drug Name Ibrance (palbociclib) Ibrance (palbociclib) Ibrance (palbociclib) Ibrance (palbociclib) Ibrance (palbociclib) Ibrance (palbociclib) Ibrance (palbociclib)   Dose 125 mg - 125 mg 125 mg 125 mg 125 mg 125 mg   Current Schedule Daily - Daily Daily Daily - Daily   Cycle Details 3 weeks on, 1 week off - 3 weeks on, 1 week off 3 weeks on, 1 week off 3  weeks on, 1 week off 3 weeks on, 1 week off 3 weeks on, 1 week off   Start Date of Last Cycle - - - 2/20/2022 - - 5/18/2022   Planned next cycle start date - - - - - - 6/18/2022   Doses missed in last 2 weeks - - - 0 - - 3   Adherence Assessment - - - Adherent - - Non-adherent   Reason for Non-adherence - - - - - - Other   Adherence Intervention Recommended - - - - - - None   Adverse Effects - - - No AE identified during assessment - - No AE identified during assessment   Any new drug interactions? - - - No - - -   Pharmacist Intervention? - - - - - - -   Intervention(s) - - - - - - -   Is the dose as ordered appropriate for the patient? - - - Yes - - -   Since the last time we talked, have you been hospitalized or used the emergency room? - - - No - - -       Last PHQ-2 Score on record:   PHQ-2 ( 1999 [employer]) 5/13/2021   Q1: Little interest or pleasure in doing things 1   Q2: Feeling down, depressed or hopeless 1   PHQ-2 Score 2   PHQ-2 Total Score (12-17 Years)- Positive if 3 or more points; Administer PHQ-A if positive 2       Vitals:  BP:   BP Readings from Last 1 Encounters:   05/13/22 127/87     Wt Readings from Last 1 Encounters:   05/13/22 110.9 kg (244 lb 6.4 oz)     Estimated body surface area is 2.37 meters squared as calculated from the following:    Height as of 11/26/21: 1.829 m (6').    Weight as of 5/13/22: 110.9 kg (244 lb 6.4 oz).    Labs:  _  Result Component Current Result Ref Range   Sodium 144 (5/13/2022) 133 - 144 mmol/L     _  Result Component Current Result Ref Range   Potassium 3.3 (L) (5/13/2022) 3.4 - 5.3 mmol/L     _  Result Component Current Result Ref Range   Calcium 9.1 (5/13/2022) 8.5 - 10.1 mg/dL     No results found for Mag within last 30 days.     No results found for Phos within last 30 days.     _  Result Component Current Result Ref Range   Albumin 3.6 (5/13/2022) 3.4 - 5.0 g/dL     _  Result Component Current Result Ref Range   Urea Nitrogen 7 (5/13/2022) 7 - 30 mg/dL      _  Result Component Current Result Ref Range   Creatinine 0.70 (5/13/2022) 0.52 - 1.04 mg/dL     _  Result Component Current Result Ref Range   AST 16 (5/13/2022) 0 - 45 U/L     _  Result Component Current Result Ref Range   ALT 21 (5/13/2022) 0 - 50 U/L     _  Result Component Current Result Ref Range   Bilirubin Total 0.3 (5/13/2022) 0.2 - 1.3 mg/dL     _  Result Component Current Result Ref Range   WBC Count 3.7 (L) (5/13/2022) 4.0 - 11.0 10e3/uL     _  Result Component Current Result Ref Range   Hemoglobin 10.9 (L) (5/13/2022) 11.7 - 15.7 g/dL     _  Result Component Current Result Ref Range   Platelet Count 173 (5/13/2022) 150 - 450 10e3/uL     _  Result Component Current Result Ref Range   Absolute Neutrophils 1.8 (5/13/2022) 1.6 - 8.3 10e3/uL     No results found for ANC within last 30 days.          Assessment/Plan:  Teresa is tolerating palbociclib therapy well with no noticeable side effects at this time. She missed 3 doses of palbociclib this cycle but she confirmed that she would make an effort to take it every day for the remainder of the cycle.     PET scan and appointment are already requested on 5/13 as active requests but no appointment have been made yet. I will reach out to scheduling team to help patient schedule next monthly lab due 6/13 as well as PET scan and Dr Plunkett appt.     Follow-Up:  Scheduling team to call patient to schedule next appt and labs.  Check for next provider visit due ~6/13 to review appointment notes.    Refill Due:  Next cycle due to start 6/18    Shazia Javed  Pharmacy Intern  Oral Chemotherapy Monitoring Program   [facility]  [phone]

## 2022-06-08 NOTE — TELEPHONE ENCOUNTER
"Spoke with pt. Since the last discussion with pt on 5/20, she reports that things have been \"up and down.\" The last two nights she's gotten better sleep, which has helped her mood. Last night she fell asleep at 2am and slept until 11a. She continues to be hyperfocused on the recent untimely death of her son. She notes difficultly with focus and attention because her mind is always on her son. She denies AH/VH, but states that \"I think too hard about my son.\" She feels that this is more related to inner monologue and denies hearing voices from outside her own self. Today she rates her depression at a 7/10, which is a slight decrease from the last discussion on 5/20. She denies SI/HI. She has supportive people around her and feels safe at home.     She continues to take Zyprexa 2.5mg at dinner and 5mg at bedtime and would like to continue this dose for now, noting that it has had some benefit. She will see Dr. Tyson on Monday and will discuss medications further at that time.    Update routed to provider.   "

## 2022-06-09 ENCOUNTER — HOSPITAL ENCOUNTER (OUTPATIENT)
Dept: PET IMAGING | Facility: CLINIC | Age: 47
Setting detail: NUCLEAR MEDICINE
Discharge: HOME | End: 2022-06-09
Attending: PHYSICIAN ASSISTANT | Admitting: PHYSICIAN ASSISTANT
Payer: COMMERCIAL

## 2022-06-09 DIAGNOSIS — C50.812 MALIGNANT NEOPLASM OF OVERLAPPING SITES OF LEFT BREAST IN FEMALE, ESTROGEN RECEPTOR POSITIVE (H): ICD-10-CM

## 2022-06-09 DIAGNOSIS — Z17.0 MALIGNANT NEOPLASM OF OVERLAPPING SITES OF LEFT BREAST IN FEMALE, ESTROGEN RECEPTOR POSITIVE (H): ICD-10-CM

## 2022-06-09 PROCEDURE — 78816 PET IMAGE W/CT FULL BODY: CPT | Mod: 26 | Performed by: STUDENT IN AN ORGANIZED HEALTH CARE EDUCATION/TRAINING PROGRAM

## 2022-06-09 PROCEDURE — 250N000011 HC RX IP 250 OP 636: Performed by: PHYSICIAN ASSISTANT

## 2022-06-09 PROCEDURE — A9552 F18 FDG: HCPCS | Performed by: PHYSICIAN ASSISTANT

## 2022-06-09 PROCEDURE — 74177 CT ABD & PELVIS W/CONTRAST: CPT | Mod: 26 | Performed by: STUDENT IN AN ORGANIZED HEALTH CARE EDUCATION/TRAINING PROGRAM

## 2022-06-09 PROCEDURE — 74177 CT ABD & PELVIS W/CONTRAST: CPT

## 2022-06-09 PROCEDURE — 78816 PET IMAGE W/CT FULL BODY: CPT | Mod: PS

## 2022-06-09 PROCEDURE — 71260 CT THORAX DX C+: CPT | Mod: 26 | Performed by: STUDENT IN AN ORGANIZED HEALTH CARE EDUCATION/TRAINING PROGRAM

## 2022-06-09 PROCEDURE — 343N000001 HC RX 343: Performed by: PHYSICIAN ASSISTANT

## 2022-06-09 RX ORDER — IOPAMIDOL 755 MG/ML
20-135 INJECTION, SOLUTION INTRAVASCULAR ONCE
Status: COMPLETED | OUTPATIENT
Start: 2022-06-09 | End: 2022-06-09

## 2022-06-09 RX ADMIN — FLUDEOXYGLUCOSE F-18 14.59 MCI.: 500 INJECTION, SOLUTION INTRAVENOUS at 14:09

## 2022-06-09 RX ADMIN — IOPAMIDOL 135 ML: 755 INJECTION, SOLUTION INTRAVENOUS at 15:12

## 2022-06-14 ENCOUNTER — ONCOLOGY VISIT (OUTPATIENT)
Dept: ONCOLOGY | Facility: CLINIC | Age: 47
End: 2022-06-14
Attending: INTERNAL MEDICINE
Payer: COMMERCIAL

## 2022-06-14 ENCOUNTER — APPOINTMENT (OUTPATIENT)
Dept: LAB | Facility: CLINIC | Age: 47
End: 2022-06-14
Attending: INTERNAL MEDICINE
Payer: COMMERCIAL

## 2022-06-14 VITALS
RESPIRATION RATE: 16 BRPM | WEIGHT: 243.6 LBS | HEART RATE: 86 BPM | OXYGEN SATURATION: 93 % | SYSTOLIC BLOOD PRESSURE: 140 MMHG | TEMPERATURE: 99.3 F | DIASTOLIC BLOOD PRESSURE: 90 MMHG | BODY MASS INDEX: 33.04 KG/M2

## 2022-06-14 DIAGNOSIS — R94.8 ABNORMAL PET SCAN OF COLON: ICD-10-CM

## 2022-06-14 DIAGNOSIS — Z17.0 MALIGNANT NEOPLASM OF OVERLAPPING SITES OF LEFT BREAST IN FEMALE, ESTROGEN RECEPTOR POSITIVE (H): Primary | ICD-10-CM

## 2022-06-14 DIAGNOSIS — C50.812 MALIGNANT NEOPLASM OF OVERLAPPING SITES OF LEFT BREAST IN FEMALE, ESTROGEN RECEPTOR POSITIVE (H): Primary | ICD-10-CM

## 2022-06-14 DIAGNOSIS — Z91.041 ALLERGIC TO IV CONTRAST: ICD-10-CM

## 2022-06-14 DIAGNOSIS — G89.3 CANCER ASSOCIATED PAIN: ICD-10-CM

## 2022-06-14 DIAGNOSIS — E04.9 ENLARGED THYROID: ICD-10-CM

## 2022-06-14 DIAGNOSIS — C50.912 CARCINOMA OF LEFT BREAST METASTATIC TO BONE (H): ICD-10-CM

## 2022-06-14 DIAGNOSIS — F29 PSYCHOSIS, UNSPECIFIED PSYCHOSIS TYPE (H): ICD-10-CM

## 2022-06-14 DIAGNOSIS — C79.51 CARCINOMA OF LEFT BREAST METASTATIC TO BONE (H): ICD-10-CM

## 2022-06-14 LAB
ALBUMIN SERPL-MCNC: 3.5 G/DL (ref 3.4–5)
ALP SERPL-CCNC: 116 U/L (ref 40–150)
ALT SERPL W P-5'-P-CCNC: 23 U/L (ref 0–50)
ANION GAP SERPL CALCULATED.3IONS-SCNC: 6 MMOL/L (ref 3–14)
AST SERPL W P-5'-P-CCNC: 21 U/L (ref 0–45)
BASOPHILS # BLD AUTO: 0.1 10E3/UL (ref 0–0.2)
BASOPHILS NFR BLD AUTO: 2 %
BILIRUB SERPL-MCNC: 0.1 MG/DL (ref 0.2–1.3)
BUN SERPL-MCNC: 14 MG/DL (ref 7–30)
CALCIUM SERPL-MCNC: 9.2 MG/DL (ref 8.5–10.1)
CANCER AG27-29 SERPL-ACNC: 58 U/ML (ref 0–39)
CEA SERPL-MCNC: 2.8 UG/L (ref 0–2.5)
CHLORIDE BLD-SCNC: 111 MMOL/L (ref 94–109)
CO2 SERPL-SCNC: 26 MMOL/L (ref 20–32)
CREAT SERPL-MCNC: 0.87 MG/DL (ref 0.52–1.04)
EOSINOPHIL # BLD AUTO: 0.1 10E3/UL (ref 0–0.7)
EOSINOPHIL NFR BLD AUTO: 2 %
ERYTHROCYTE [DISTWIDTH] IN BLOOD BY AUTOMATED COUNT: 15.3 % (ref 10–15)
GFR SERPL CREATININE-BSD FRML MDRD: 83 ML/MIN/1.73M2
GLUCOSE BLD-MCNC: 144 MG/DL (ref 70–99)
HCT VFR BLD AUTO: 36.6 % (ref 35–47)
HGB BLD-MCNC: 11.9 G/DL (ref 11.7–15.7)
IMM GRANULOCYTES # BLD: 0 10E3/UL
IMM GRANULOCYTES NFR BLD: 0 %
LYMPHOCYTES # BLD AUTO: 1.5 10E3/UL (ref 0.8–5.3)
LYMPHOCYTES NFR BLD AUTO: 38 %
MCH RBC QN AUTO: 32.3 PG (ref 26.5–33)
MCHC RBC AUTO-ENTMCNC: 32.5 G/DL (ref 31.5–36.5)
MCV RBC AUTO: 100 FL (ref 78–100)
MONOCYTES # BLD AUTO: 0.3 10E3/UL (ref 0–1.3)
MONOCYTES NFR BLD AUTO: 7 %
NEUTROPHILS # BLD AUTO: 2.1 10E3/UL (ref 1.6–8.3)
NEUTROPHILS NFR BLD AUTO: 51 %
NRBC # BLD AUTO: 0 10E3/UL
NRBC BLD AUTO-RTO: 0 /100
PLATELET # BLD AUTO: 208 10E3/UL (ref 150–450)
POTASSIUM BLD-SCNC: 4.3 MMOL/L (ref 3.4–5.3)
PROT SERPL-MCNC: 7.5 G/DL (ref 6.8–8.8)
RBC # BLD AUTO: 3.68 10E6/UL (ref 3.8–5.2)
SODIUM SERPL-SCNC: 143 MMOL/L (ref 133–144)
TSH SERPL DL<=0.005 MIU/L-ACNC: 2.05 MU/L (ref 0.4–4)
WBC # BLD AUTO: 4.1 10E3/UL (ref 4–11)

## 2022-06-14 PROCEDURE — 80053 COMPREHEN METABOLIC PANEL: CPT | Performed by: INTERNAL MEDICINE

## 2022-06-14 PROCEDURE — 99215 OFFICE O/P EST HI 40 MIN: CPT | Performed by: INTERNAL MEDICINE

## 2022-06-14 PROCEDURE — 36415 COLL VENOUS BLD VENIPUNCTURE: CPT | Performed by: INTERNAL MEDICINE

## 2022-06-14 PROCEDURE — 86300 IMMUNOASSAY TUMOR CA 15-3: CPT | Performed by: INTERNAL MEDICINE

## 2022-06-14 PROCEDURE — 82378 CARCINOEMBRYONIC ANTIGEN: CPT | Performed by: INTERNAL MEDICINE

## 2022-06-14 PROCEDURE — 85025 COMPLETE CBC W/AUTO DIFF WBC: CPT | Performed by: INTERNAL MEDICINE

## 2022-06-14 PROCEDURE — 84443 ASSAY THYROID STIM HORMONE: CPT | Performed by: INTERNAL MEDICINE

## 2022-06-14 PROCEDURE — G0463 HOSPITAL OUTPT CLINIC VISIT: HCPCS

## 2022-06-14 RX ORDER — METHYLPREDNISOLONE 32 MG/1
TABLET ORAL
Qty: 2 TABLET | Refills: 0 | Status: SHIPPED | OUTPATIENT
Start: 2022-06-14 | End: 2022-10-04

## 2022-06-14 RX ORDER — OXYCODONE HYDROCHLORIDE 5 MG/1
5-10 TABLET ORAL EVERY 6 HOURS PRN
Qty: 90 TABLET | Refills: 0 | Status: SHIPPED | OUTPATIENT
Start: 2022-06-14 | End: 2022-07-14

## 2022-06-14 RX ORDER — ZOLEDRONIC ACID 0.04 MG/ML
4 INJECTION, SOLUTION INTRAVENOUS ONCE
Status: CANCELLED | OUTPATIENT
Start: 2022-08-16 | End: 2022-08-16

## 2022-06-14 RX ORDER — HEPARIN SODIUM (PORCINE) LOCK FLUSH IV SOLN 100 UNIT/ML 100 UNIT/ML
5 SOLUTION INTRAVENOUS
Status: CANCELLED | OUTPATIENT
Start: 2022-08-16

## 2022-06-14 RX ORDER — HEPARIN SODIUM,PORCINE 10 UNIT/ML
5 VIAL (ML) INTRAVENOUS
Status: CANCELLED | OUTPATIENT
Start: 2022-08-16

## 2022-06-14 RX ORDER — MORPHINE SULFATE 15 MG/1
15 TABLET, FILM COATED, EXTENDED RELEASE ORAL EVERY 12 HOURS
Qty: 60 TABLET | Refills: 0 | Status: SHIPPED | OUTPATIENT
Start: 2022-06-14 | End: 2022-07-14

## 2022-06-14 ASSESSMENT — PAIN SCALES - GENERAL: PAINLEVEL: MILD PAIN (2)

## 2022-06-14 NOTE — NURSING NOTE
Oncology Rooming Note    June 14, 2022 1:26 PM   Teresa Sanderson is a 46 year old female who presents for:    Chief Complaint   Patient presents with     Blood Draw     Labs drawn via  by RN. Vitals taken.     Oncology Clinic Visit     BREAST CANCER     Initial Vitals: BP (!) 140/90 (BP Location: Right arm)   Pulse 86   Temp 99.3  F (37.4  C) (Oral)   Resp 16   Wt 110.5 kg (243 lb 9.6 oz)   SpO2 93%   BMI 33.04 kg/m   Estimated body mass index is 33.04 kg/m  as calculated from the following:    Height as of 11/26/21: 1.829 m (6').    Weight as of this encounter: 110.5 kg (243 lb 9.6 oz). Body surface area is 2.37 meters squared.  Mild Pain (2) Comment: Data Unavailable   No LMP recorded. (Menstrual status: Tubal ).  Allergies reviewed: Yes  Medications reviewed: Yes    Medications: MEDICATION REFILLS NEEDED TODAY. Provider was NOT notified.  Pharmacy name entered into EPIC:    [facility] - [place], [place] - [address] [place] [address] [facility]  [id number] - [place], [place] - [address] AT [facility] - [place], [place] - [address] [facility]  [facility]IV[facility] - [place], [place] - [facility] [address]  [facility] - [place], [place] - [address] [facility]    Clinical concerns: Patient reports she fell down the stairs on Saturday (6/11) when doing laundry. She states since then, she has been doubling up on Oxycodone due to severe back pain. She reports that she needs refills on the following:    Oxycodone (ran out this morning)  Morphine (will run out in ~1-1.5 weeks)  Methocarbamol   Arimidex    Would like these medications to go to [facility] on [facility] [address] in [place].    Dr. Plunkett was notified.      Aisha Daly [facility]

## 2022-06-14 NOTE — LETTER
6/14/2022         RE: Teresa Sanderson  [address] [facility] [id number]        Dear Colleague,    Thank you for referring your patient, Teresa Sanderson, to the [facility]. Please see a copy of my visit note below.    Oncology Visit:   Date on this visit: Jun 14, 2022    Diagnosis:  ER positive left breast cancer metastasized to bones.     Primary Physician: No Ref-Primary, Physician     History Of Present Illness:    Ms. Sanderson is a 46 year old female with a h/o tobacco abuse and DVTs with left breast cancer metastasized to bone. She presented to [place] ED with back pain on 12/5/2020. MRI of the L-spine showed an abnormal L4 lesion with associated right paraspinal mass, abnormalities in L5 and the left iliac bone were also seen. CT C/A/P showed a left breast mass, lytic lesions of T7, L4, and the pelvis, and a 3 cm lesion in the kidney (thought to be a cyst). Ultrasound of the bilateral lower extremities showed a non-occlusive thrombus in the left popliteal vein. Mammogram and ultrasound of the bilateral breasts on 12/17/2020 showed a spiculated mass measuring at least 7.8 cm at 12-1:00 left breast extending from the nipple to 9 cm from the nipple with associated nipple retraction. This mass was biopsied, and showed IDC with surrounding DCIS, grade 3, ER+ 90%, and AZ+ 75%.  HER2 was equivocal in approximately 35% of tumor cells by FISH and was negative by IHC.    Metastatic Breast Cancer Treatment:  12/23/2021 - 1/7/2021  Radiation (3000 cGy) to the lumbar spine.  1/29/2021 - present  Ibrance, zoladex, and anastrozole.  5/17/2021 radiofrequency ablation, kyphoplasty to L4  8/20/2021  Bilateral salpingo-oophorectomies, Ibrance and anastrozole.  She was off anastrozole 12/2021 - 2/2022 and off Ibrance 01/2022 - 02/2022 due to stress and impending homelessness. Off both again 03/2022-04/2022 due to death of her son but restarted in 05/2022.    Interval History:  Ms.  Kin fell walking down the stairs on 6/11 and has had increased right sided low back pain since. She doubled the amount of opioids she was taking and started Tylenol, with minimal relief. She is taking 1000 mg of tylenol daily.  The pain is worse with movement and in the morning when she is stiff.  The pain is limited to the low right back. Despite this, she has been able to walk and conduct her ADL's. Otherwise, she is still emotionally coping with the death of her son this past spring from an accidental overdose. She has now tapped into a strong support network of friends that are helping her. She feels overall her mood is still poor from grief, but improving. She continues to live with a cousin and is looking for other residence.  She denies active SI. She had stopped talking Ibrance and anastrozole from March-April, the months following her son's death, as she had given up hope and was overwhelmed. She restarted these medications in May and has been tolerating them well. She gets hot flashes every 2-3 hours at night and has worsening vaginal dryness. She has constipation with a bowel movement every other day and takes Miralax. Despite these side effects, she is motivated to stay on these medications and treat her cancer. She has noticed new itchiness of her left breast but no other abnormalities. She is currently applying for independent housing. She denies nausea/vomiting, dyspnea, headache.    The remainder of a complete 12 point review of systems was reviewed with the patient and was negative with the exception of that mentioned above.    Past Medical/Surgical History:   Past Medical History:   Diagnosis Date     Anxiety      Breast CA (H) 12/2020     Depression      DVT (deep venous thrombosis) (H) 2014     Left breast mass     x approximately 4-5 months     Pulmonary emboli (H)      Pyelonephritis      Schizoaffective disorder (H)      Tobacco use      Past Surgical History:   Procedure Laterality Date      IR LUMBAR KYPHOPLASTY VERTEBRAE  5/17/2021     LAPAROSCOPIC SALPINGO-OOPHORECTOMY Bilateral 8/20/2021    Procedure: BILATERAL SALPINGO-OOPHORECTOMY, LAPAROSCOPIC;  Surgeon: Rory Lopez MD;  Location: [facility] OR     TUBAL LIGATION  1998        Allergies   Allergen Reactions     Contrast Dye      Pt developed nausea after isovue 370 injection on 6/9/21        Current Outpatient Medications   Medication     anastrozole (ARIMIDEX) 1 MG tablet     benztropine (COGENTIN) 0.5 MG tablet     diclofenac (VOLTAREN) 1 % topical gel     gabapentin (NEURONTIN) 300 MG capsule     ibuprofen (ADVIL/MOTRIN) 800 MG tablet     methocarbamol (ROBAXIN) 500 MG tablet     methylPREDNISolone (MEDROL) 32 MG tablet     morphine (MS CONTIN) 15 MG CR tablet     naloxone (NARCAN) 4 MG/0.1ML nasal spray     nicotine (NICORETTE) 2 MG gum     OLANZapine (ZYPREXA) 2.5 MG tablet     oxyCODONE (ROXICODONE) 5 MG tablet     palbociclib (IBRANCE) 125 MG tablet     pantoprazole (PROTONIX) 40 MG EC tablet     polyethylene glycol (MIRALAX) 17 GM/Dose powder     rivaroxaban ANTICOAGULANT (XARELTO) 20 MG TABS tablet     SENNA-docusate sodium (SENNA S) 8.6-50 MG tablet     sennosides (SENOKOT) 8.6 MG tablet     sertraline (ZOLOFT) 100 MG tablet     tolnaftate (TINACTIN) 1 % external cream     Vitamin D3 (CHOLECALCIFEROL) 25 mcg (1000 units) tablet     No current facility-administered medications for this visit.   '  Physical Exam:   BP (!) 140/90 (BP Location: Right arm)   Pulse 86   Temp 99.3  F (37.4  C) (Oral)   Resp 16   Wt 110.5 kg (243 lb 9.6 oz)   SpO2 93%   BMI 33.04 kg/m    General:  Fatigued, tearful appearing adult female in NAD.  Alert and oriented.  HEENT:  Normocephalic.  Sclera anicteric.  MMM.  No lesions of the oropharynx.  Has had all of her upper teeth pulled; no dentures as of yet. Enlarged thyroid on exam.  Lymph:  No palpable cervical, supraclavicular, or axillary LAD.  Chest:  CTA bilaterally.  No wheezes or  crackles.  Breast: Firm 4 cm x 3 cm mass on left breast at 12:00-1:00 8 cm from the nipple, no masses on right breast, nipples everted, scar from biopsy site  CV:  RRR.  Nl S1 and S2.    Abd:  Soft/NT/ND.   Ext:  No pitting edema of the bilateral lower extremities.    Musculo:  Strength 5/5 throughout.  Good movement of all 4 extremities.  Neuro:  Cranial nerves grossly intact.  Gait is stable.  Psych:  Mood and affect appear normal.    Laboratory/Imaging Studies:   2022 Labs:  Electrolytes are wnl with the exception of chloride elevated at 111.  Kidney and liver function are wnl.  Blood counts are wnl.    CA27.29   43 --- 42 --- 43 --- 58 (2022 - 2022)    CEA   2.1 --- 2.3 --- 2.8 --- 2.8 (2022 - 2022)    Imagin2022 PET/CT:  IMPRESSION: In this patient with a history of metastatic breast  carcinoma, there is concern for disease progression since PET-CT  12/3/2021:     1. Larger and more avid hypermetabolic soft tissue at the clip in the  left breast, suspicious for progressive locally recurrent disease.     2. Resolved uptake to the left axillary nodes, compatible with  resolved reactive nodes or responded lesions.     3. Increased uptake to the rectosigmoid junction of the colon with  suspicion for a mural nodule. This is not likely a metastasis but may  represent a synchronous primary malignancy. Previously this region was  decompressed and uptake was suggestive of peristalsis, and this  remains on the differential diagnosis. Consider colonoscopy to  evaluate for possible lesion.     4. Nonavid sclerotic lesions in the skeleton are similar to prior.    FDG uptake at left breast biopsy clip:      FDG uptake at rectosigmoid junction:      ASSESSMENT/PLAN:   46 year old female with history of DVT and left breast invasive ductal carcinoma, ER/AZ positive, HER2 negative metastasized to bones.    1.  Metastatic breast cancer: Her PET CT from 22 shows increased intensity in her left  breast and rectosigmoid junction. Given over the past 6 months she has been off recommended for at least 3 months, it is unclear whether progression is secondary to resistance vs non-compliance. We therefore recommend continuing the current therapy with repeat imaging in 2 months to assess the efficacy of this regimen.  Due to contrast allergy, I recommend she take methylprednisolone prior to the scan.  We will refer her for colonoscopy for evaluation of FDG uptake in the rectosigmoid junction.    We reviewed with the patient the nature of metastatic breast cancer, the ability of disease to become resistant to treatment and non-compliance with treatment leading to decreased time to progression.  If next imaging shows disease progression, would consider next line treatment with abemaciclib and fulvestrant.    2.  Schizoaffective disorder, bipolar type: She is on treatment with Zoloft and Zyprexa.  Ongoing follow up with Dr. Tyson.  She has increased anxiety around scans.    3.  Bone metastases/back pain:  She is s/p XRT to the lumbar spine and kyphoplasty of L4.  She is taking MS contin 15 mg PO BID and oxycodone and ibu profen prn.  She also takes methocarbamol prn muscle spasms. For her new back pain, we recommended adding 600 mg ibuprofen BID and heat pack application. If her symptoms do not resolve by 6/25, we encouraged her to seek care for this issue.    Of note, if persistent back pain, would recommend follow up with palliative medicine who was previously managing her cancer related pain.    On Zometa since 1/18/2021 and is receiving once every 3 months. Next dose will be due around 8/11/2022.    4.  DVT:  She confirms that she has continued on Xarelto.  Recommend continuing Xarelto until contraindicated given metastatic disease.      5.  Enlarged thyroid: TSH with free T4 added on to today's labs.    6.  Follow Up:    Colonoscopy within 2 weeks.  Labs and visit with Alee Yang in 4 weeks.  Labs and visit  with me in 8 weeks.  PET/CT 1-2 business days prior to return visit with me.    Patient was seen alongside fourth year medical student Tristan Olivas.  I have edited the above note to reflect our joint assessment and plan.  I spent 45 minutes on the date of the encounter doing chart review, review of test results, interpretation of tests, patient visit and documentation.         Again, thank you for allowing me to participate in the care of your patient.        Sincerely,        Jahaira Plunkett MD

## 2022-06-14 NOTE — LETTER
Date:June 24, 2022      Provider requested that no letter be sent. Do not send.       [facility]

## 2022-06-14 NOTE — NURSING NOTE
Chief Complaint   Patient presents with     Blood Draw     Labs drawn via  by RN. Vitals taken.     Labs collected from venipuncture by RN. Vitals taken. Checked in for appointment(s).    Eliza Ochoa RN

## 2022-06-14 NOTE — PROGRESS NOTES
Oncology Visit:   Date on this visit: Jun 14, 2022    Diagnosis:  ER positive left breast cancer metastasized to bones.     Primary Physician: No Ref-Primary, Physician     History Of Present Illness:    Ms. Sanderson is a 46 year old female with a h/o tobacco abuse and DVTs with left breast cancer metastasized to bone. She presented to [place] ED with back pain on 12/5/2020. MRI of the L-spine showed an abnormal L4 lesion with associated right paraspinal mass, abnormalities in L5 and the left iliac bone were also seen. CT C/A/P showed a left breast mass, lytic lesions of T7, L4, and the pelvis, and a 3 cm lesion in the kidney (thought to be a cyst). Ultrasound of the bilateral lower extremities showed a non-occlusive thrombus in the left popliteal vein. Mammogram and ultrasound of the bilateral breasts on 12/17/2020 showed a spiculated mass measuring at least 7.8 cm at 12-1:00 left breast extending from the nipple to 9 cm from the nipple with associated nipple retraction. This mass was biopsied, and showed IDC with surrounding DCIS, grade 3, ER+ 90%, and ME+ 75%.  HER2 was equivocal in approximately 35% of tumor cells by FISH and was negative by IHC.    Metastatic Breast Cancer Treatment:  12/23/2021 - 1/7/2021  Radiation (3000 cGy) to the lumbar spine.  1/29/2021 - present  Ibrance, zoladex, and anastrozole.  5/17/2021 radiofrequency ablation, kyphoplasty to L4  8/20/2021  Bilateral salpingo-oophorectomies, Ibrance and anastrozole.  She was off anastrozole 12/2021 - 2/2022 and off Ibrance 01/2022 - 02/2022 due to stress and impending homelessness. Off both again 03/2022-04/2022 due to death of her son but restarted in 05/2022.    Interval History:  Ms. Sanderson fell walking down the stairs on 6/11 and has had increased right sided low back pain since. She doubled the amount of opioids she was taking and started Tylenol, with minimal relief. She is taking 1000 mg of tylenol daily.  The pain is worse with movement  and in the morning when she is stiff.  The pain is limited to the low right back. Despite this, she has been able to walk and conduct her ADL's. Otherwise, she is still emotionally coping with the death of her son this past spring from an accidental overdose. She has now tapped into a strong support network of friends that are helping her. She feels overall her mood is still poor from grief, but improving. She continues to live with a cousin and is looking for other residence.  She denies active SI. She had stopped talking Ibrance and anastrozole from March-April, the months following her son's death, as she had given up hope and was overwhelmed. She restarted these medications in May and has been tolerating them well. She gets hot flashes every 2-3 hours at night and has worsening vaginal dryness. She has constipation with a bowel movement every other day and takes Miralax. Despite these side effects, she is motivated to stay on these medications and treat her cancer. She has noticed new itchiness of her left breast but no other abnormalities. She is currently applying for independent housing. She denies nausea/vomiting, dyspnea, headache.    The remainder of a complete 12 point review of systems was reviewed with the patient and was negative with the exception of that mentioned above.    Past Medical/Surgical History:   Past Medical History:   Diagnosis Date     Anxiety      Breast CA (H) 12/2020     Depression      DVT (deep venous thrombosis) (H) 2014     Left breast mass     x approximately 4-5 months     Pulmonary emboli (H)      Pyelonephritis      Schizoaffective disorder (H)      Tobacco use      Past Surgical History:   Procedure Laterality Date     IR LUMBAR KYPHOPLASTY VERTEBRAE  5/17/2021     LAPAROSCOPIC SALPINGO-OOPHORECTOMY Bilateral 8/20/2021    Procedure: BILATERAL SALPINGO-OOPHORECTOMY, LAPAROSCOPIC;  Surgeon: Rory Lopez MD;  Location: [facility] OR     TUBAL LIGATION  1998         Allergies   Allergen Reactions     Contrast Dye      Pt developed nausea after isovue 370 injection on 6/9/21        Current Outpatient Medications   Medication     anastrozole (ARIMIDEX) 1 MG tablet     benztropine (COGENTIN) 0.5 MG tablet     diclofenac (VOLTAREN) 1 % topical gel     gabapentin (NEURONTIN) 300 MG capsule     ibuprofen (ADVIL/MOTRIN) 800 MG tablet     methocarbamol (ROBAXIN) 500 MG tablet     methylPREDNISolone (MEDROL) 32 MG tablet     morphine (MS CONTIN) 15 MG CR tablet     naloxone (NARCAN) 4 MG/0.1ML nasal spray     nicotine (NICORETTE) 2 MG gum     OLANZapine (ZYPREXA) 2.5 MG tablet     oxyCODONE (ROXICODONE) 5 MG tablet     palbociclib (IBRANCE) 125 MG tablet     pantoprazole (PROTONIX) 40 MG EC tablet     polyethylene glycol (MIRALAX) 17 GM/Dose powder     rivaroxaban ANTICOAGULANT (XARELTO) 20 MG TABS tablet     SENNA-docusate sodium (SENNA S) 8.6-50 MG tablet     sennosides (SENOKOT) 8.6 MG tablet     sertraline (ZOLOFT) 100 MG tablet     tolnaftate (TINACTIN) 1 % external cream     Vitamin D3 (CHOLECALCIFEROL) 25 mcg (1000 units) tablet     No current facility-administered medications for this visit.   '  Physical Exam:   BP (!) 140/90 (BP Location: Right arm)   Pulse 86   Temp 99.3  F (37.4  C) (Oral)   Resp 16   Wt 110.5 kg (243 lb 9.6 oz)   SpO2 93%   BMI 33.04 kg/m    General:  Fatigued, tearful appearing adult female in NAD.  Alert and oriented.  HEENT:  Normocephalic.  Sclera anicteric.  MMM.  No lesions of the oropharynx.  Has had all of her upper teeth pulled; no dentures as of yet. Enlarged thyroid on exam.  Lymph:  No palpable cervical, supraclavicular, or axillary LAD.  Chest:  CTA bilaterally.  No wheezes or crackles.  Breast: Firm 4 cm x 3 cm mass on left breast at 12:00-1:00 8 cm from the nipple, no masses on right breast, nipples everted, scar from biopsy site  CV:  RRR.  Nl S1 and S2.    Abd:  Soft/NT/ND.   Ext:  No pitting edema of the bilateral lower  extremities.    Musculo:  Strength 5/5 throughout.  Good movement of all 4 extremities.  Neuro:  Cranial nerves grossly intact.  Gait is stable.  Psych:  Mood and affect appear normal.    Laboratory/Imaging Studies:   2022 Labs:  Electrolytes are wnl with the exception of chloride elevated at 111.  Kidney and liver function are wnl.  Blood counts are wnl.    CA27.29   43 --- 42 --- 43 --- 58 (2022 - 2022)    CEA   2.1 --- 2.3 --- 2.8 --- 2.8 (2022 - 2022)    Imagin2022 PET/CT:  IMPRESSION: In this patient with a history of metastatic breast  carcinoma, there is concern for disease progression since PET-CT  12/3/2021:     1. Larger and more avid hypermetabolic soft tissue at the clip in the  left breast, suspicious for progressive locally recurrent disease.     2. Resolved uptake to the left axillary nodes, compatible with  resolved reactive nodes or responded lesions.     3. Increased uptake to the rectosigmoid junction of the colon with  suspicion for a mural nodule. This is not likely a metastasis but may  represent a synchronous primary malignancy. Previously this region was  decompressed and uptake was suggestive of peristalsis, and this  remains on the differential diagnosis. Consider colonoscopy to  evaluate for possible lesion.     4. Nonavid sclerotic lesions in the skeleton are similar to prior.    FDG uptake at left breast biopsy clip:      FDG uptake at rectosigmoid junction:      ASSESSMENT/PLAN:   46 year old female with history of DVT and left breast invasive ductal carcinoma, ER/CO positive, HER2 negative metastasized to bones.    1.  Metastatic breast cancer: Her PET CT from 22 shows increased intensity in her left breast and rectosigmoid junction. Given over the past 6 months she has been off recommended for at least 3 months, it is unclear whether progression is secondary to resistance vs non-compliance. We therefore recommend continuing the current therapy  with repeat imaging in 2 months to assess the efficacy of this regimen.  Due to contrast allergy, I recommend she take methylprednisolone prior to the scan.  We will refer her for colonoscopy for evaluation of FDG uptake in the rectosigmoid junction.    We reviewed with the patient the nature of metastatic breast cancer, the ability of disease to become resistant to treatment and non-compliance with treatment leading to decreased time to progression.  If next imaging shows disease progression, would consider next line treatment with abemaciclib and fulvestrant.    2.  Schizoaffective disorder, bipolar type: She is on treatment with Zoloft and Zyprexa.  Ongoing follow up with Dr. Tyson.  She has increased anxiety around scans.    3.  Bone metastases/back pain:  She is s/p XRT to the lumbar spine and kyphoplasty of L4.  She is taking MS contin 15 mg PO BID and oxycodone and ibu profen prn.  She also takes methocarbamol prn muscle spasms. For her new back pain, we recommended adding 600 mg ibuprofen BID and heat pack application. If her symptoms do not resolve by 6/25, we encouraged her to seek care for this issue.    Of note, if persistent back pain, would recommend follow up with palliative medicine who was previously managing her cancer related pain.    On Zometa since 1/18/2021 and is receiving once every 3 months. Next dose will be due around 8/11/2022.    4.  DVT:  She confirms that she has continued on Xarelto.  Recommend continuing Xarelto until contraindicated given metastatic disease.      5.  Enlarged thyroid: TSH with free T4 added on to today's labs.    6.  Follow Up:    Colonoscopy within 2 weeks.  Labs and visit with Alee Yang in 4 weeks.  Labs and visit with me in 8 weeks.  PET/CT 1-2 business days prior to return visit with me.    Patient was seen alongside fourth year medical student Tristan Olivas.  I have edited the above note to reflect our joint assessment and plan.  I spent 45 minutes on the  date of the encounter doing chart review, review of test results, interpretation of tests, patient visit and documentation.

## 2022-06-14 NOTE — TELEPHONE ENCOUNTER
"[facility] Call Center    Phone Message    May a detailed message be left on voicemail: yes     Reason for Call: Other: Patient cancelled appt with Dr Tyson today because she was driving home from another appt at [facility].  The Virtual Facilitator who spoke with her is Deidre Stone.  Deidre requested that a nurse call patient regarding medications and to follow up with her.  Deidre stated \"Could a nurse call her and follow up w / med? Looks like she got a lot going on\".  Since patient won't be able to be seen by Dr Tyson until at least September now, would it be possible for someone to check on her?  Thank you!     Action Taken: Other: Nursing Pool    Travel Screening: Not Applicable                                                                      "

## 2022-06-15 NOTE — TELEPHONE ENCOUNTER
Writer called pt on her mobile number and obtained no answer. Left a DVM. Clinic information provided. Will attempt to reach pt later today.   Teresa Sofia RN

## 2022-06-16 ENCOUNTER — DOCUMENTATION ONLY (OUTPATIENT)
Dept: PSYCHIATRY | Facility: CLINIC | Age: 47
End: 2022-06-16
Payer: COMMERCIAL

## 2022-06-17 RX ORDER — OLANZAPINE 2.5 MG/1
TABLET, FILM COATED ORAL
Qty: 120 TABLET | Refills: 2 | Status: SHIPPED | OUTPATIENT
Start: 2022-06-17 | End: 2022-09-07

## 2022-06-17 NOTE — PROGRESS NOTES
[facility]    Will recommend Zyprexa be increased to 10mg. RN[facility] team will contact pt on Friday 17th to discuss this recommendation and arrange followup.  MD Derrell

## 2022-06-17 NOTE — TELEPHONE ENCOUNTER
"Spoke with pt:  -Mood continues to be low due to emotionally grieving loss of son. Crying daily. She denies SI. Her motivation to go on are her grand kids, of which she has 6. Has strong support system (sister, best friends) and feels like she is finally able to talk openly about her distress which has helped with emotional processing.   -Sleeping about 6 hours/night. Falling asleep late and sleeping into the day. Has had 4 instances of vivid dreaming in which she communicates with son. Not happening during the day, but does frequently hear her son's voice during the day calling out \"mama.\"   -Recent scan shows more activity in the tumor in L breast. Is back on chemotherapy which she stopped taking from March-April in the context of losing her son - \"I gave up on myself.\"  -Pt open to trial of olanzapine 10mg daily. She is currently taking 2 tablets (5mg) at dinner and one tablet (2.5mg) at bedtime. She will increase to 5mg at dinner and 5mg at bedtime. Reviewed side effects - tremor, restlessness, dizziness, sedation. Pt instructed to contact clinic should these occur.   -Cigarette use: Smoking 3-4 cigarettes per day, usually only 3 puffs per cigarette. Is interested in quitting. Was recently prescribed nicotine replacement gum but she is unable to chew gum due to dental concerns. Pt made aware that cigarette smoke may decrease olanzapine level and was asked to notify clinic if she does quit smoking.   -Pt informed that Dr. Tyson will be out for an extended amount of time. Pt open to seeing a covering provider should issues arise during Dr. Tyson' absence.  "

## 2022-06-20 DIAGNOSIS — F17.200 NICOTINE DEPENDENCE, UNCOMPLICATED, UNSPECIFIED NICOTINE PRODUCT TYPE: Primary | ICD-10-CM

## 2022-06-20 RX ORDER — POLYETHYLENE GLYCOL 3350 17 G
2 POWDER IN PACKET (EA) ORAL
Qty: 27 LOZENGE | Refills: 3 | Status: SHIPPED | OUTPATIENT
Start: 2022-06-20 | End: 2023-12-07

## 2022-06-27 ENCOUNTER — TELEPHONE (OUTPATIENT)
Dept: PSYCHIATRY | Facility: CLINIC | Age: 47
End: 2022-06-27

## 2022-06-27 NOTE — TELEPHONE ENCOUNTER
Writer called pt's mobile and home number and obtained no answer. Left a DVM requesting a call back. Clinic information provided.   Teresa Sofia RN

## 2022-06-28 NOTE — TELEPHONE ENCOUNTER
Skyer was able to reach pt via her home number. Pt had family visiting and wasn't available to converse for too long, but skyer had enough time to get pt on the schedule with Dr. Hernandez on 7/6/22 from 1 pm to 2 pm. Pt denies any safety concerns at this time. Will route a message to scheduling team to complete pt's appt that was placed on hold. Will continue to follow and assist pt as needed.   Teresa Sofia RN

## 2022-06-30 ENCOUNTER — TELEPHONE (OUTPATIENT)
Dept: GASTROENTEROLOGY | Facility: CLINIC | Age: 47
End: 2022-06-30

## 2022-06-30 DIAGNOSIS — Z12.11 SPECIAL SCREENING FOR MALIGNANT NEOPLASMS, COLON: Primary | ICD-10-CM

## 2022-06-30 RX ORDER — BISACODYL 5 MG/1
TABLET, DELAYED RELEASE ORAL
Qty: 2 TABLET | Refills: 0 | Status: SHIPPED | OUTPATIENT
Start: 2022-06-30 | End: 2022-10-16

## 2022-06-30 NOTE — TELEPHONE ENCOUNTER
Pre assessment questions completed for upcoming colonoscopy procedure scheduled on 7.8.22.    Discussed at home rapid antigen COVID test 1-2 days prior to procedure. Patient choosing this option.     Reviewed procedural arrival time 0710 and [facility].    Designated  policy reviewed. Instructed to have someone stay 24 hours post procedure.     Anticoagulation/blood thinners? Xarelto    Electronic implanted devices? no    Reviewed extended golytely prep instructions with patient. No fiber/iron supplements or foods that contain nuts/seeds prior to procedure.     Patient verbalized understanding and had no questions or concerns at this time.    Carin Allen RN

## 2022-06-30 NOTE — TELEPHONE ENCOUNTER
Melany Gray, CNP  Carin Allen RN; Rita Maza PA-C  Cc: Yolie Cunha, RN  2 days is fine with me. Thanks!      Previous Messages       ----- Message -----   From: Carin Allen RN   Sent: 6/30/2022  12:28 PM CDT   To: Rita Maza PA-C, Yolie Cunha RN, *   Subject: RE: Xarelto holding interval                   Per oncology provider as noted above, patient is to hold Xarelto for 2 days prior to procedure. This was discussed with patient who verbalized understanding and in agreement with plan.

## 2022-06-30 NOTE — TELEPHONE ENCOUNTER
.Screening Questions    BlueKIND OF PREP RedLOCATION [review exclusion criteria] GreenSEDATION TYPE    1. Are you active on mychart? Y    2. What insurance is in the chart? HH     3.   Ordering/Referring Provider: CHARY BALDWIN    **(Sedation review/consideration needed)**  Do you have a legal guardian or Medical Power of    and/or are you able to give consent for your medical care?     Y    4. BMI   (If greater than 40 review exclusion criteria [PAC APPT IF [MAC] @ U)  33.5  [If yes, BMI OVER 40-EXTENDED PREP]    5. Have you had a positive covid test in the last 90 days?   N -     6.  Are you currently on dialysis?   N [ If yes, G-PREP & HOSPITAL setting ONLY]     7.  Do you have chronic kidney disease?  N [ If yes, G-PREP ]    8.   Do you have a diagnosis of diabetes?   N   [ If yes, G-PREP ]    9.  On a regular basis do you go 3-5 days between bowel movements?   Y   [ If yes, EXTENDED PREP]    10.  Are you taking any prescription pain medications on a routine schedule?    Y - OXY [ If yes, EXTENDED PREP] [If yes, MAC]      11.   Do you have any chemical dependencies such as alcohol, street drugs, or methadone?    N [If yes, MAC]    12.   Do you have any history of post-traumatic stress syndrome, severe anxiety or history of psychosis?    Y  [If yes, MAC]    13.  [FEMALES] Are you currently pregnant? N    If yes, how many weeks?       Respiratory/Heart Screening:  [If yes to any of the following HOSPITAL setting only]     14. Do you use daily home oxygen?  N       15.  Do you have mod to severe Obstructive Sleep Apnea?         ([facility]  [facility]  [facility] - if pt is not on OXYGEN)  N     16.  Do you have Pulmonary Hypertension [Lungs]?   N      17.  Do you have UNCONTROLLED asthma?  N     18.   Have you had a heart or lung transplant?   N      19.   In the past 6 months have you had a stroke or Transient ischemic attack (TIA - aka  mini stroke ) ?  (If yes, please review exclusion  criteria)  N     20.   Have you had any heart related issues including cardiomyopathy or heart attack within 6 months?   N           If yes, did it require cardiac stenting or other implantable device?         21.   Do you have any implantable devices in your body (pacemaker, defib, LVAD)? (If yes, please review exclusion criteria)  N     22. Do you take nitroglycerin?   N           If yes, how often?   (if yes, HOSPITAL setting ONLY)    23.  Are you currently taking any blood thinners?    [If yes, INFORM patient to follw up w/ ORDERING PROVIDER FOR BRIDGING INSTRUCTIONS]     Y    24.   Do you transfer independently?                (If NO, please HOSPITAL setting ONLY)  Y    25.   Preferred LOCAL Pharmacy for Pre Prescription:           [id number] - [place], [place] - [address] AT [facility] OF [address]    Scheduling Details  (Please ask for phone number if not scheduled by patient)      Caller : Teresa Sanderson    Date of Procedure: 7/8/22  Surgeon: WONG WING  Location: [facility]        Sedation Type: MAC   Conscious Sedation- Needs  for 6 hours after the procedure  MAC/General-Needs  for 24 hours after procedure    N :[Pre-op Required] at [facility]  MG and OR for MAC sedation   (advise patient they will need a pre-op WITH IN 30 DAYS of procedure date)     Type of Procedure Scheduled:   Lower Endoscopy [Colonoscopy]    Which Colonoscopy Prep was Sent?:   EXTENDED     [facility] PATIENTS & [facility] PATIENTS NEEDS EXTENDED PREP       Informed patient they will need an adult  Y  Cannot take any type of public or medical transportation alone    Pre-Procedure Covid test to be completed at [facility] or Externally: AT HOME  **INFORMED OF HOME TESTING & LAB OPTION**        Confirmed Nurse will call to complete assessment Y    Additional comments:

## 2022-07-06 ENCOUNTER — VIRTUAL VISIT (OUTPATIENT)
Dept: PSYCHIATRY | Facility: CLINIC | Age: 47
End: 2022-07-06
Attending: PSYCHIATRY & NEUROLOGY
Payer: COMMERCIAL

## 2022-07-06 DIAGNOSIS — Z63.4 BEREAVEMENT: ICD-10-CM

## 2022-07-06 DIAGNOSIS — F25.0 SCHIZOAFFECTIVE DISORDER, BIPOLAR TYPE (H): Primary | ICD-10-CM

## 2022-07-06 PROCEDURE — 99215 OFFICE O/P EST HI 40 MIN: CPT | Mod: 95 | Performed by: STUDENT IN AN ORGANIZED HEALTH CARE EDUCATION/TRAINING PROGRAM

## 2022-07-06 SDOH — SOCIAL STABILITY - SOCIAL INSECURITY: DISSAPEARANCE AND DEATH OF FAMILY MEMBER: Z63.4

## 2022-07-06 ASSESSMENT — ANXIETY QUESTIONNAIRES
7. FEELING AFRAID AS IF SOMETHING AWFUL MIGHT HAPPEN: NEARLY EVERY DAY
4. TROUBLE RELAXING: NEARLY EVERY DAY
8. IF YOU CHECKED OFF ANY PROBLEMS, HOW DIFFICULT HAVE THESE MADE IT FOR YOU TO DO YOUR WORK, TAKE CARE OF THINGS AT HOME, OR GET ALONG WITH OTHER PEOPLE?: SOMEWHAT DIFFICULT
5. BEING SO RESTLESS THAT IT IS HARD TO SIT STILL: SEVERAL DAYS
GAD7 TOTAL SCORE: 13
2. NOT BEING ABLE TO STOP OR CONTROL WORRYING: NEARLY EVERY DAY
6. BECOMING EASILY ANNOYED OR IRRITABLE: SEVERAL DAYS
3. WORRYING TOO MUCH ABOUT DIFFERENT THINGS: SEVERAL DAYS
GAD7 TOTAL SCORE: 13
GAD7 TOTAL SCORE: 13
1. FEELING NERVOUS, ANXIOUS, OR ON EDGE: SEVERAL DAYS
7. FEELING AFRAID AS IF SOMETHING AWFUL MIGHT HAPPEN: NEARLY EVERY DAY

## 2022-07-06 ASSESSMENT — PATIENT HEALTH QUESTIONNAIRE - PHQ9
SUM OF ALL RESPONSES TO PHQ QUESTIONS 1-9: 15
10. IF YOU CHECKED OFF ANY PROBLEMS, HOW DIFFICULT HAVE THESE PROBLEMS MADE IT FOR YOU TO DO YOUR WORK, TAKE CARE OF THINGS AT HOME, OR GET ALONG WITH OTHER PEOPLE: SOMEWHAT DIFFICULT
SUM OF ALL RESPONSES TO PHQ QUESTIONS 1-9: 15

## 2022-07-06 NOTE — PATIENT INSTRUCTIONS
**For crisis resources, please see the information at the end of this document**   Patient Education    Thank you for coming to the [facility] [place] [facility].     Lab Testing:  If you had lab testing today and your results are reassuring or normal they will be mailed to you or sent through [employer] within 7 days. If the lab tests need quick action we will call you with the results. The phone number we will call with results is # [phone]. If this is not the best number please call our clinic and change the number.     Medication Refills:  If you need any refills please call your pharmacy and they will contact us. Our fax number for refills is [phone].   Three business days of notice are needed for general medication refill requests.   Five business days of notice are needed for controlled substance refill requests.   If you need to change to a different pharmacy, please contact the new pharmacy directly. The new pharmacy will help you get your medications transferred.     Contact Us:  Please call [phone] during business hours (8-5:00 M-F).   If you have medication related questions after clinic hours, or on the weekend, please call [phone].     Financial Assistance [phone]   Medical Records [phone]       MENTAL HEALTH CRISIS RESOURCES:  For a emergency help, please call 911 or go to the nearest Emergency Department.     Emergency Walk-In Options:   [facility] Unit @ [facility] ([place]): [phone] - Specialized mental health emergency area designed to be calming  [facility] ([place]): [phone]  [facility] ([place]): [phone]  [facility]): [phone]    [facility] Crisis Information:   [employer]: [phone]  [place]: [phone]  Kayla (SAURABH) - Adult: [phone]     Child: [phone]  [place] - Adult: [phone]     Child: [phone]  [place]:  [phone]  List of all [facility] resources:   https://[place].gov/dhs/people-we-serve/adults/health-care/mental-health/resources/crisis-contacts.jsp    National Crisis Information:   Crisis Text Line: Text  [place]  to [address]18  National Suicide Prevention Lifeline: 7-959-366-TALK (1-228.500.6456)       For online chat options, visit https://suicidepreventionlifeline.org/chat/  Poison Control Center: 2-572-060-7584  Trans Lifeline: 6-828-839-0709 - Hotline for transgender people of all ages  [employer]: 4-844-884-0556 - Hotline for LGBT youth     For Non-Emergency Support:   Fast Tracker: Mental Health & Substance Use Disorder Resources -   https://www.[employer].org/
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
## 2022-07-06 NOTE — PROGRESS NOTES
Teresa Sanderson is a 46 year old who has consented to receive services via billable video visit.      Pt will join video visit via: SWYF  If there are problems joining the visit, send backup video invite via: Text to preferred phone: [phone]      Originating Location (patient location): [facility]  Distant Location (provider location): [facility] [place] [facility]    Will anyone else be joining the video visit? No    How would you prefer to obtain AVS?: White Source  Answers for HPI/ROS submitted by the patient on 7/6/2022  If you checked off any problems, how difficult have these problems made it for you to do your work, take care of things at home, or get along with other people?: Somewhat difficult  PHQ9 TOTAL SCORE: 15  JENNIFFER 7 TOTAL SCORE: 13

## 2022-07-08 ENCOUNTER — ANESTHESIA EVENT (OUTPATIENT)
Dept: SURGERY | Facility: AMBULATORY SURGERY CENTER | Age: 47
End: 2022-07-08
Payer: COMMERCIAL

## 2022-07-08 ENCOUNTER — HOSPITAL ENCOUNTER (OUTPATIENT)
Facility: AMBULATORY SURGERY CENTER | Age: 47
Discharge: HOME | End: 2022-07-08
Attending: INTERNAL MEDICINE
Payer: COMMERCIAL

## 2022-07-08 ENCOUNTER — ANESTHESIA (OUTPATIENT)
Dept: SURGERY | Facility: AMBULATORY SURGERY CENTER | Age: 47
End: 2022-07-08
Payer: COMMERCIAL

## 2022-07-08 VITALS
SYSTOLIC BLOOD PRESSURE: 158 MMHG | WEIGHT: 243 LBS | RESPIRATION RATE: 16 BRPM | HEIGHT: 72 IN | BODY MASS INDEX: 32.91 KG/M2 | DIASTOLIC BLOOD PRESSURE: 40 MMHG | OXYGEN SATURATION: 100 % | TEMPERATURE: 97.9 F | HEART RATE: 57 BPM

## 2022-07-08 VITALS — HEART RATE: 67 BPM

## 2022-07-08 LAB
COLONOSCOPY: NORMAL
HCG UR QL: NEGATIVE
INTERNAL QC OK POCT: NORMAL
POCT KIT EXPIRATION DATE: NORMAL
POCT KIT LOT NUMBER: NORMAL

## 2022-07-08 PROCEDURE — 88305 TISSUE EXAM BY PATHOLOGIST: CPT | Mod: 26 | Performed by: PATHOLOGY

## 2022-07-08 PROCEDURE — 45385 COLONOSCOPY W/LESION REMOVAL: CPT

## 2022-07-08 PROCEDURE — 88305 TISSUE EXAM BY PATHOLOGIST: CPT | Mod: TC | Performed by: INTERNAL MEDICINE

## 2022-07-08 PROCEDURE — 81025 URINE PREGNANCY TEST: CPT | Performed by: PATHOLOGY

## 2022-07-08 RX ORDER — LIDOCAINE 40 MG/G
CREAM TOPICAL
Status: DISCONTINUED | OUTPATIENT
Start: 2022-07-08 | End: 2022-07-08 | Stop reason: HOSPADM

## 2022-07-08 RX ORDER — LIDOCAINE HYDROCHLORIDE 20 MG/ML
INJECTION, SOLUTION INFILTRATION; PERINEURAL PRN
Status: DISCONTINUED | OUTPATIENT
Start: 2022-07-08 | End: 2022-07-08

## 2022-07-08 RX ORDER — PROCHLORPERAZINE MALEATE 10 MG
10 TABLET ORAL EVERY 6 HOURS PRN
Status: DISCONTINUED | OUTPATIENT
Start: 2022-07-08 | End: 2022-07-09 | Stop reason: HOSPADM

## 2022-07-08 RX ORDER — NALOXONE HYDROCHLORIDE 0.4 MG/ML
0.2 INJECTION, SOLUTION INTRAMUSCULAR; INTRAVENOUS; SUBCUTANEOUS
Status: DISCONTINUED | OUTPATIENT
Start: 2022-07-08 | End: 2022-07-09 | Stop reason: HOSPADM

## 2022-07-08 RX ORDER — PROPOFOL 10 MG/ML
INJECTION, EMULSION INTRAVENOUS CONTINUOUS PRN
Status: DISCONTINUED | OUTPATIENT
Start: 2022-07-08 | End: 2022-07-08

## 2022-07-08 RX ORDER — SODIUM CHLORIDE, SODIUM LACTATE, POTASSIUM CHLORIDE, CALCIUM CHLORIDE 600; 310; 30; 20 MG/100ML; MG/100ML; MG/100ML; MG/100ML
INJECTION, SOLUTION INTRAVENOUS CONTINUOUS
Status: DISCONTINUED | OUTPATIENT
Start: 2022-07-08 | End: 2022-07-08 | Stop reason: HOSPADM

## 2022-07-08 RX ORDER — NALOXONE HYDROCHLORIDE 0.4 MG/ML
0.4 INJECTION, SOLUTION INTRAMUSCULAR; INTRAVENOUS; SUBCUTANEOUS
Status: DISCONTINUED | OUTPATIENT
Start: 2022-07-08 | End: 2022-07-09 | Stop reason: HOSPADM

## 2022-07-08 RX ORDER — FLUMAZENIL 0.1 MG/ML
0.2 INJECTION, SOLUTION INTRAVENOUS
Status: ACTIVE | OUTPATIENT
Start: 2022-07-08 | End: 2022-07-08

## 2022-07-08 RX ORDER — ONDANSETRON 4 MG/1
4 TABLET, ORALLY DISINTEGRATING ORAL EVERY 6 HOURS PRN
Status: DISCONTINUED | OUTPATIENT
Start: 2022-07-08 | End: 2022-07-09 | Stop reason: HOSPADM

## 2022-07-08 RX ORDER — ONDANSETRON 2 MG/ML
4 INJECTION INTRAMUSCULAR; INTRAVENOUS EVERY 6 HOURS PRN
Status: DISCONTINUED | OUTPATIENT
Start: 2022-07-08 | End: 2022-07-09 | Stop reason: HOSPADM

## 2022-07-08 RX ORDER — ONDANSETRON 2 MG/ML
4 INJECTION INTRAMUSCULAR; INTRAVENOUS
Status: DISCONTINUED | OUTPATIENT
Start: 2022-07-08 | End: 2022-07-08 | Stop reason: HOSPADM

## 2022-07-08 RX ORDER — PROPOFOL 10 MG/ML
INJECTION, EMULSION INTRAVENOUS PRN
Status: DISCONTINUED | OUTPATIENT
Start: 2022-07-08 | End: 2022-07-08

## 2022-07-08 RX ADMIN — PROPOFOL 10 MG: 10 INJECTION, EMULSION INTRAVENOUS at 08:39

## 2022-07-08 RX ADMIN — SODIUM CHLORIDE, SODIUM LACTATE, POTASSIUM CHLORIDE, CALCIUM CHLORIDE: 600; 310; 30; 20 INJECTION, SOLUTION INTRAVENOUS at 08:14

## 2022-07-08 RX ADMIN — LIDOCAINE HYDROCHLORIDE 60 MG: 20 INJECTION, SOLUTION INFILTRATION; PERINEURAL at 08:37

## 2022-07-08 RX ADMIN — PROPOFOL 200 MCG/KG/MIN: 10 INJECTION, EMULSION INTRAVENOUS at 08:37

## 2022-07-08 RX ADMIN — PROPOFOL 30 MG: 10 INJECTION, EMULSION INTRAVENOUS at 08:37

## 2022-07-08 NOTE — ANESTHESIA CARE TRANSFER NOTE
Patient: Teresa Sanderson    Procedure: Procedure(s):  COLONOSCOPY, WITH POLYPECTOMY       Diagnosis: Abnormal PET scan of colon [R94.8]  Diagnosis Additional Information: No value filed.    Anesthesia Type:   No value filed.     Note:    Oropharynx: oropharynx clear of all foreign objects and spontaneously breathing  Level of Consciousness: awake  Oxygen Supplementation: room air    Independent Airway: airway patency satisfactory and stable  Dentition: dentition unchanged  Vital Signs Stable: post-procedure vital signs reviewed and stable  Report to RN Given: handoff report given  Patient transferred to: Phase II    Handoff Report: Identifed the Patient, Identified the Reponsible Provider, Reviewed the pertinent medical history, Discussed the surgical course, Reviewed Intra-OP anesthesia mangement and issues during anesthesia, Set expectations for post-procedure period and Allowed opportunity for questions and acknowledgement of understanding      Vitals:  Vitals Value Taken Time   /85    Temp 36.8  C (98.3  F) 07/08/22 0903   Pulse 65 07/08/22 0903   Resp 18 07/08/22 0903   SpO2 100 % 07/08/22 0903       Electronically Signed By: RENO Zelaya CRNA  July 8, 2022  9:08 AM

## 2022-07-08 NOTE — H&P
Teresa Sanderson  [id number]  female  46 year old      Reason for procedure/surgery: abnormal PET rectosigmoid colon lesion    Patient Active Problem List   Diagnosis     Breast mass     Spine metastasis (H)     Urinary tract infection with hematuria     Malignant neoplasm of overlapping sites of left breast in female, estrogen receptor positive (H)     Carcinoma of left breast metastatic to bone (H)     Metastasis to bone (H)     Pain of right lower leg     Malignant neoplasm of female breast, unspecified estrogen receptor status, unspecified laterality, unspecified site of breast (H)     Schizoaffective disorder, bipolar type (H)     Psychosis, unspecified psychosis type (H)     Insomnia, unspecified type       Past Surgical History:    Past Surgical History:   Procedure Laterality Date     IR LUMBAR KYPHOPLASTY VERTEBRAE  5/17/2021     LAPAROSCOPIC SALPINGO-OOPHORECTOMY Bilateral 8/20/2021    Procedure: BILATERAL SALPINGO-OOPHORECTOMY, LAPAROSCOPIC;  Surgeon: Rory Lopez MD;  Location: [facility] OR     TUBAL LIGATION  1998       Past Medical History:   Past Medical History:   Diagnosis Date     Anxiety      Breast CA (H) 12/2020     Depression      DVT (deep venous thrombosis) (H) 2014     Left breast mass     x approximately 4-5 months     Pulmonary emboli (H)      Pyelonephritis      Schizoaffective disorder (H)      Tobacco use        Social History:   Social History     Tobacco Use     Smoking status: Current Every Day Smoker     Packs/day: 0.25     Types: Cigarettes     Start date: 5/13/2011     Smokeless tobacco: Never Used   Substance Use Topics     Alcohol use: Not Currently     Comment: occ       Family History:   Family History   Problem Relation Age of Onset     Lung Cancer Mother      Lung Cancer Father      Lupus Brother      Kidney Disease Brother      Diabetes Daughter      Asthma Son      Cardiovascular Maternal Aunt      Hypertension Other      Diabetes Other      Deep Vein Thrombosis  (DVT) No family hx of        Allergies:   Allergies   Allergen Reactions     Contrast Dye      Pt developed nausea after isovue 370 injection on 6/9/21        Active Medications:   Current Outpatient Medications   Medication Sig Dispense Refill     bisacodyl (DULCOLAX) 5 MG EC tablet Take as directed. One day prior to exam at 10:00am take 2 tablets 2 tablet 0     ibuprofen (ADVIL/MOTRIN) 800 MG tablet Take 800 mg by mouth every 6 hours as needed for moderate pain       magnesium citrate solution Take as directed. Two days prior to exam drink 10oz bottle of magnesium citrate at 4:00pm 296 mL 0     methocarbamol (ROBAXIN) 500 MG tablet Take 500 mg by mouth 4 times daily as needed for muscle spasms       morphine (MS CONTIN) 15 MG CR tablet Take 1 tablet (15 mg) by mouth every 12 hours 60 tablet 0     nicotine (COMMIT) 2 MG lozenge Place 1 lozenge (2 mg) inside cheek every hour as needed for smoking cessation 27 lozenge 3     nicotine (NICORETTE) 2 MG gum Place 1 each (2 mg) inside cheek every hour as needed for smoking cessation 90 each 1     OLANZapine (ZYPREXA) 2.5 MG tablet Take two tabs (5mg) by mouth after dinner and two tabs (5mg) later in the evening 120 tablet 2     oxyCODONE (ROXICODONE) 5 MG tablet Take 1-2 tablets (5-10 mg) by mouth every 6 hours as needed for moderate to severe pain 90 tablet 0     palbociclib (IBRANCE) 125 MG tablet Take 1 tablet (125 mg) by mouth daily Take for 21 days, then 7 days off. 21 tablet 2     pantoprazole (PROTONIX) 40 MG EC tablet Take 1 tablet (40 mg) by mouth every morning (before breakfast) 30 tablet 1     polyethylene glycol (GOLYTELY) 236 g suspension Take as directed. One day before your exam fill the first container with water. Cover and shake until mixed well. At 3:00pm drink one 8oz glass every 10-15 minutes until half of the first container is empty. Store the remainder in the refrigerator. At 8:00pm drink the second half of the first container until it is gone.  Before you go to bed mix the second container with water and put in refrigerator. Six hours before your check in time drink one 8oz glass every 10-15 minutes until half of container is empty. Discard the remainder of solution. 8000 mL 0     polyethylene glycol (MIRALAX) 17 GM/Dose powder Take 17 g by mouth daily as needed for constipation 578 g 3     rivaroxaban ANTICOAGULANT (XARELTO) 20 MG TABS tablet Take 1 tablet (20 mg) by mouth daily (with dinner) 30 tablet 11     SENNA-docusate sodium (SENNA S) 8.6-50 MG tablet Take 2 tablets by mouth 2 times daily as needed (Constipation) 100 tablet 3     sertraline (ZOLOFT) 100 MG tablet Take one tab (100mg) by mouth every morning 30 tablet 2     Vitamin D3 (CHOLECALCIFEROL) 25 mcg (1000 units) tablet Take 1 tablet (25 mcg) by mouth daily 90 tablet 3     anastrozole (ARIMIDEX) 1 MG tablet Take 1 tablet (1 mg) by mouth daily for 28 days 28 tablet 2     diclofenac (VOLTAREN) 1 % topical gel Apply 2 g topically 4 times daily 350 g 3     gabapentin (NEURONTIN) 300 MG capsule Take 2 capsules (600 mg) by mouth every morning AND 2 capsules (600 mg) daily at 2 pm AND 2 capsules (600 mg) At Bedtime. 180 capsule 2     methylPREDNISolone (MEDROL) 32 MG tablet 12 hours and 2 hours prior to the procedure with IV contrast 2 tablet 0     naloxone (NARCAN) 4 MG/0.1ML nasal spray Spray 1 spray (4 mg) into one nostril alternating nostrils once as needed for opioid reversal every 2-3 minutes until assistance arrives (Patient not taking: No sig reported) 0.2 mL 0     sennosides (SENOKOT) 8.6 MG tablet Take 1-2 tablets by mouth 2 times daily as needed for constipation 120 tablet 1     sertraline (ZOLOFT) 50 MG tablet Take 1 tablet (50 mg) by mouth daily Take in addition to 100mg tablet for total dose of 150mg daily 30 tablet 1     tolnaftate (TINACTIN) 1 % external cream Apply topically 2 times daily 30 g 1       Systemic Review:   CONSTITUTIONAL: NEGATIVE for fever, chills, change in  weight  ENT/MOUTH: NEGATIVE for ear, mouth and throat problems  RESP: NEGATIVE for significant cough or SOB  CV: NEGATIVE for chest pain, palpitations or peripheral edema    Physical Examination:   Vital Signs: BP (!) 138/92   Pulse 63   Temp 97.1  F (36.2  C) (Temporal)   Resp 18   Ht 1.829 m (6')   Wt 110.2 kg (243 lb)   SpO2 99%   BMI 32.96 kg/m    GENERAL: healthy, alert and no distress  NECK: no adenopathy, no asymmetry, masses, or scars  RESP: lungs clear to auscultation - no rales, rhonchi or wheezes  CV: regular rate and rhythm, normal S1 S2, no S3 or S4, no murmur, click or rub, no peripheral edema and peripheral pulses strong  ABDOMEN: soft, nontender, no hepatosplenomegaly, no masses and bowel sounds normal  MS: no gross musculoskeletal defects noted, no edema    ASA: 2    Mallampati Score: 1    Plan: Appropriate to proceed as scheduled.      Ham Cano MD  7/8/2022    PCP:  No Ref-Primary, Physician

## 2022-07-08 NOTE — ANESTHESIA PREPROCEDURE EVALUATION
Anesthesia Pre-Procedure Evaluation    Patient: Teresa Sanderson   MRN: [id number] : 1975        Procedure : Procedure(s):  COLONOSCOPY, WITH POLYPECTOMY AND BIOPSY          Past Medical History:   Diagnosis Date     Anxiety      Breast CA (H) 2020     Depression      DVT (deep venous thrombosis) (H)      Left breast mass     x approximately 4-5 months     Pulmonary emboli (H)      Pyelonephritis      Schizoaffective disorder (H)      Tobacco use       Past Surgical History:   Procedure Laterality Date     IR LUMBAR KYPHOPLASTY VERTEBRAE  2021     LAPAROSCOPIC SALPINGO-OOPHORECTOMY Bilateral 2021    Procedure: BILATERAL SALPINGO-OOPHORECTOMY, LAPAROSCOPIC;  Surgeon: Rory Lopez MD;  Location: [facility] OR     TUBAL LIGATION        Allergies   Allergen Reactions     Contrast Dye      Pt developed nausea after isovue 370 injection on 21       Social History     Tobacco Use     Smoking status: Current Every Day Smoker     Packs/day: 0.25     Types: Cigarettes     Start date: 2011     Smokeless tobacco: Never Used   Substance Use Topics     Alcohol use: Not Currently     Comment: occ      Wt Readings from Last 1 Encounters:   22 110.2 kg (243 lb)        Anesthesia Evaluation   Pt has had prior anesthetic.     No history of anesthetic complications       ROS/MED HX  ENT/Pulmonary:       Neurologic:       Cardiovascular:       METS/Exercise Tolerance:     Hematologic:     (+) History of blood clots,     Musculoskeletal:       GI/Hepatic:       Renal/Genitourinary:       Endo:       Psychiatric/Substance Use:       Infectious Disease:       Malignancy: Comment: With bone mets  (+) Malignancy, History of Breast.    Other:            Physical Exam    Airway  airway exam normal           Respiratory Devices and Support         Dental       (+) upper dentures      Cardiovascular   cardiovascular exam normal          Pulmonary   pulmonary exam normal                OUTSIDE  LABS:  CBC:   Lab Results   Component Value Date    WBC 4.1 06/14/2022    WBC 3.7 (L) 05/13/2022    HGB 11.9 06/14/2022    HGB 10.9 (L) 05/13/2022    HCT 36.6 06/14/2022    HCT 32.7 (L) 05/13/2022     06/14/2022     05/13/2022     BMP:   Lab Results   Component Value Date     06/14/2022     05/13/2022    POTASSIUM 4.3 06/14/2022    POTASSIUM 3.3 (L) 05/13/2022    CHLORIDE 111 (H) 06/14/2022    CHLORIDE 107 05/13/2022    CO2 26 06/14/2022    CO2 28 05/13/2022    BUN 14 06/14/2022    BUN 7 05/13/2022    CR 0.87 06/14/2022    CR 0.70 05/13/2022     (H) 06/14/2022     (H) 05/13/2022     COAGS:   Lab Results   Component Value Date    PTT 29 12/06/2020    INR 1.03 05/17/2021    FIBR 546 (H) 12/09/2020     POC:   Lab Results   Component Value Date     (H) 05/17/2021    HCG Negative 07/08/2022    HCGS Negative 08/10/2021     HEPATIC:   Lab Results   Component Value Date    ALBUMIN 3.5 06/14/2022    PROTTOTAL 7.5 06/14/2022    ALT 23 06/14/2022    AST 21 06/14/2022    ALKPHOS 116 06/14/2022    BILITOTAL 0.1 (L) 06/14/2022     OTHER:   Lab Results   Component Value Date    NANDO 9.2 06/14/2022    TSH 2.05 06/14/2022    T4 1.14 09/13/2021    CRP 21.0 (H) 12/09/2020       Anesthesia Plan    ASA Status:  3   NPO Status:  NPO Appropriate    Anesthesia Type: MAC.     - Reason for MAC: immobility needed   Induction: Intravenous.   Maintenance: TIVA.        Consents    Anesthesia Plan(s) and associated risks, benefits, and realistic alternatives discussed. Questions answered and patient/representative(s) expressed understanding.    - Discussed:     - Discussed with:  Patient      - Extended Intubation/Ventilatory Support Discussed: No.      - Patient is DNR/DNI Status: No    Use of blood products discussed: No .     Postoperative Care    Pain management: IV analgesics.   PONV prophylaxis: Background Propofol Infusion     Comments:           H&P reviewed: Unable to attach H&P to encounter  due to EHR limitations. H&P Update: appropriate H&P reviewed, patient examined. No interval changes since H&P (within 30 days).         Jamie Calixto MD

## 2022-07-08 NOTE — ANESTHESIA POSTPROCEDURE EVALUATION
Patient: Teresa Sanderson    Procedure: Procedure(s):  COLONOSCOPY, WITH POLYPECTOMY       Anesthesia Type:  MAC    Note:  Disposition: Outpatient   Postop Pain Control: Uneventful            Sign Out: Well controlled pain   PONV: No   Neuro/Psych: Uneventful            Sign Out: Acceptable/Baseline neuro status   Airway/Respiratory: Uneventful            Sign Out: Acceptable/Baseline resp. status   CV/Hemodynamics: Uneventful            Sign Out: Acceptable CV status; No obvious hypovolemia; No obvious fluid overload   Other NRE: NONE   DID A NON-ROUTINE EVENT OCCUR? No           Last vitals:  Vitals Value Taken Time   /40 07/08/22 0930   Temp 36.6  C (97.9  F) 07/08/22 0915   Pulse 57 07/08/22 0930   Resp 16 07/08/22 0930   SpO2 100 % 07/08/22 0930       Electronically Signed By: Jamie Calixto MD  July 8, 2022  9:56 AM

## 2022-07-12 DIAGNOSIS — Z86.0100 HISTORY OF COLONIC POLYPS: Primary | ICD-10-CM

## 2022-07-13 ENCOUNTER — TELEPHONE (OUTPATIENT)
Dept: GASTROENTEROLOGY | Facility: CLINIC | Age: 47
End: 2022-07-13

## 2022-07-13 ENCOUNTER — HOSPITAL ENCOUNTER (OUTPATIENT)
Facility: AMBULATORY SURGERY CENTER | Age: 47
End: 2022-07-13
Attending: INTERNAL MEDICINE
Payer: COMMERCIAL

## 2022-07-13 NOTE — PROGRESS NOTES
Oncology Visit:   Date on this visit: Jul 14, 2022    Diagnosis:  ER positive left breast cancer metastasized to bones.     Primary Physician: No Ref-Primary, Physician     History Of Present Illness:    Ms. Sanderson is a 46 year old female with a h/o tobacco abuse and DVTs with left breast cancer metastasized to bone. She presented to [facility] ED with back pain on 12/5/2020. MRI of the L-spine showed an abnormal L4 lesion with associated right paraspinal mass, abnormalities in L5 and the left iliac bone were also seen. CT C/A/P showed a left breast mass, lytic lesions of T7, L4, and the pelvis, and a 3 cm lesion in the kidney (thought to be a cyst). Ultrasound of the bilateral lower extremities showed a non-occlusive thrombus in the left popliteal vein. Mammogram and ultrasound of the bilateral breasts on 12/17/2020 showed a spiculated mass measuring at least 7.8 cm at 12-1:00 left breast extending from the nipple to 9 cm from the nipple with associated nipple retraction. This mass was biopsied, and showed IDC with surrounding DCIS, grade 3, ER+ 90%, and OH+ 75%.  HER2 was equivocal in approximately 35% of tumor cells by FISH and was negative by IHC.    Metastatic Breast Cancer Treatment:  12/23/2021 - 1/7/2021  Radiation (3000 cGy) to the lumbar spine.  1/29/2021 - present  Ibrance, zoladex, and anastrozole.  5/17/2021 radiofrequency ablation, kyphoplasty to L4  8/20/2021  Bilateral salpingo-oophorectomies, Ibrance and anastrozole.  She was off anastrozole 12/2021 - 2/2022 and off Ibrance 01/2022 - 02/2022 due to stress and impending homelessness. Off both again 03/2022-04/2022 due to death of her son but restarted in 05/2022.    Interval History: Teresa has been feeling okay. She is back on ibrance and anastrozole. Since resuming she has felt overall body stiffness in the morning and when she is sedentary. Since her fall her back pain remains increased--however she notes really since her diagnosis her back  pain has not improved. She needs refills on her pain medication today. Pain is isolated to low back. No leg weakness or radiating pain.     Appetite is intact. No nausea. Bowels are regular. She is fatigued. No fevers/chills or infectious concerns.     Mood has been okay though she is actively grieving the loss of her son.     She is looking for independent housing and would like to talk to a .       Past Medical/Surgical History:   Past Medical History:   Diagnosis Date     Anxiety      Breast CA (H) 12/2020     Depression      DVT (deep venous thrombosis) (H) 2014     Left breast mass     x approximately 4-5 months     Pulmonary emboli (H)      Pyelonephritis      Schizoaffective disorder (H)      Tobacco use      Past Surgical History:   Procedure Laterality Date     COLONOSCOPY N/A 7/8/2022    Procedure: COLONOSCOPY, WITH POLYPECTOMY;  Surgeon: Ham Cano MD;  Location: [facility] OR     IR LUMBAR KYPHOPLASTY VERTEBRAE  5/17/2021     LAPAROSCOPIC SALPINGO-OOPHORECTOMY Bilateral 8/20/2021    Procedure: BILATERAL SALPINGO-OOPHORECTOMY, LAPAROSCOPIC;  Surgeon: Rory Lopez MD;  Location: [facility] OR     TUBAL LIGATION  1998        Allergies   Allergen Reactions     Contrast Dye      Pt developed nausea after isovue 370 injection on 6/9/21        Current Outpatient Medications   Medication     gabapentin (NEURONTIN) 300 MG capsule     methocarbamol (ROBAXIN) 500 MG tablet     morphine (MS CONTIN) 15 MG CR tablet     oxyCODONE (ROXICODONE) 5 MG tablet     anastrozole (ARIMIDEX) 1 MG tablet     bisacodyl (DULCOLAX) 5 MG EC tablet     diclofenac (VOLTAREN) 1 % topical gel     ibuprofen (ADVIL/MOTRIN) 800 MG tablet     magnesium citrate solution     methylPREDNISolone (MEDROL) 32 MG tablet     naloxone (NARCAN) 4 MG/0.1ML nasal spray     nicotine (COMMIT) 2 MG lozenge     nicotine (NICORETTE) 2 MG gum     OLANZapine (ZYPREXA) 2.5 MG tablet     palbociclib (IBRANCE) 125 MG tablet     pantoprazole  (PROTONIX) 40 MG EC tablet     polyethylene glycol (GOLYTELY) 236 g suspension     polyethylene glycol (MIRALAX) 17 GM/Dose powder     rivaroxaban ANTICOAGULANT (XARELTO) 20 MG TABS tablet     SENNA-docusate sodium (SENNA S) 8.6-50 MG tablet     sennosides (SENOKOT) 8.6 MG tablet     sertraline (ZOLOFT) 100 MG tablet     sertraline (ZOLOFT) 50 MG tablet     tolnaftate (TINACTIN) 1 % external cream     Vitamin D3 (CHOLECALCIFEROL) 25 mcg (1000 units) tablet     No current facility-administered medications for this visit.   '  Physical Exam:   /89 (BP Location: Right arm, Patient Position: Sitting, Cuff Size: Adult Large)   Pulse 70   Temp 97.9  F (36.6  C) (Oral)   Resp 16   Wt 112.4 kg (247 lb 14.4 oz)   SpO2 99%   BMI 33.62 kg/m    General:  Pleasant adult female in NAD.  Alert and oriented.  HEENT:  Normocephalic.  Sclera anicteric. Dentures in place. Enlarged thyroid on exam.  Lymph:  No palpable cervical, supraclavicular, or axillary LAD.  Chest:  CTA bilaterally.  No wheezes or crackles.  Breast: Deferred   CV:  RRR.  Nl S1 and S2.    Abd:  Soft/NT/ND. + BS   Ext:  No pitting edema of the bilateral lower extremities.    Musculo:  Strength 5/5 throughout.  Good movement of all 4 extremities.  Neuro:  Cranial nerves grossly intact.  Gait is stable.  Psych:  Mood and affect appear normal.    Laboratory/Imaging Studies:    07/14/22 11:58   Sodium 144   Potassium 3.9   Chloride 110 (H)   Carbon Dioxide 25   Urea Nitrogen 12   Creatinine 0.75   GFR Estimate >90   Calcium 9.4   Anion Gap 9   Albumin 3.5   Protein Total 7.7   Alkaline Phosphatase 112   ALT 15   AST 11   Bilirubin Total 0.3   Glucose 139 (H)   WBC 2.6 (L)   Hemoglobin 11.7   Hematocrit 35.2   Platelet Count 171   RBC Count 3.60 (L)   MCV 98   MCH 32.5   MCHC 33.2   RDW 14.9   % Neutrophils 46   % Lymphocytes 44   % Monocytes 6   % Eosinophils 2   % Basophils 2   Absolute Basophils 0.0   Absolute Eosinophils 0.1   Absolute Immature  Granulocytes 0.0   Absolute Lymphocytes 1.2   Absolute Monocytes 0.2   % Immature Granulocytes 0   Absolute Neutrophils 1.2 (L)   Absolute NRBCs 0.0   NRBCs per 100 WBC 0           ASSESSMENT/PLAN:   46 year old female with history of DVT and left breast invasive ductal carcinoma, ER/IN positive, HER2 negative metastasized to bones.    1.  Metastatic breast cancer: Her PET CT from 6/9/22 shows increased intensity in her left breast and rectosigmoid junction. Given over the past 6 months she has been off recommended for at least 3 months, it is unclear whether progression is secondary to resistance vs non-compliance. We therefore recommend continuing the current therapy with repeat imaging in 2 months to assess the efficacy of this regimen.  Due to contrast allergy, I recommend she take methylprednisolone prior to the scan. She had a colonoscopy revealing two polyps which were resected.  -She is overall tolerating the medication well but I suspect some joint stiffness is related to getting back on anastrozole.   -Scans next month.    If next imaging shows disease progression, would consider next line treatment with abemaciclib and fulvestrant.    2.  Schizoaffective disorder, bipolar type: She is on treatment with Zoloft and Zyprexa.  Ongoing follow up with psychiatry.  She has increased anxiety around scans.    3.  Bone metastases/back pain:  She is s/p XRT to the lumbar spine and kyphoplasty of L4.  She is taking MS contin 15 mg PO BID and oxycodone and ibu profen prn.  She also takes methocarbamol prn muscle spasms. All were refilled today.     On Zometa since 1/18/2021 and is receiving once every 3 months. Next dose will be due around 8/11/2022.    4.  DVT:  She confirms that she has continued on Xarelto.  Recommend continuing Xarelto until contraindicated given metastatic disease.      5.  Enlarged thyroid: Thyroid tests WNL last month. Will follow-up on thyroid on PET next month and obtain US if needed.      Alee Yang PA-C

## 2022-07-13 NOTE — TELEPHONE ENCOUNTER
Screening Questions    BlueKIND OF PREP RedLOCATION [review exclusion criteria] GreenSEDATION TYPE      1. Are you active on mychart? Yes    2. What insurance is in the chart? [employer]     3.   Ordering/Referring Provider: WONG Cano    4. BMI   (If greater than 40 review exclusion criteria [PAC APPT IF [MAC] @ [facility])  32.9  [If yes, BMI OVER 40-EXTENDED PREP]      **(Sedation review/consideration needed)**  Do you have a legal guardian or Medical Power of    and/or are you able to give consent for your medical care?     Yes    5. Have you had a positive covid test in the last 90 days?   N - NA    6.  Are you currently on dialysis?   N [ If yes, G-PREP & HOSPITAL setting ONLY]     7.  Do you have chronic kidney disease?  N [ If yes, G-PREP ]    8.   Do you have a diagnosis of diabetes?   N   [ If yes, G-PREP ]    9.  On a regular basis do you go 3-5 days between bowel movements?   Yes   [ If yes, EXTENDED PREP]    10.  Are you taking any prescription pain medications on a routine schedule?    Yes -  [ If yes, EXTENDED PREP] [If yes, MAC]      11.   Do you have any chemical dependencies such as alcohol, street drugs, or methadone?    N [If yes, MAC]    12.   Do you have any history of post-traumatic stress syndrome, severe anxiety or history of psychosis?    N  [If yes, MAC]    13.  [FEMALES] Are you currently pregnant? NA    If yes, how many weeks?       Respiratory/Heart Screening:  [If yes to any of the following HOSPITAL setting only]     14. Do you have Pulmonary Hypertension [Lungs]?   N       15. Do you have UNCONTROLLED asthma?   N     16.  Do you use daily home oxygen?  N      17. Do you have mod to severe Obstructive Sleep Apnea?         (OKAY @ [facility]  [facility]  SH  PH  RI  MG - if pt is not on OXYGEN)  N      18.   Have you had a heart or lung transplant?   N      19.   Have you had a stroke or Transient ischemic attack (TIA - aka  mini stroke ) within 6 months?  (If yes, please review exclusion  criteria)  N     20.   In the past 6 months, have you had any heart related issues including cardiomyopathy or heart attack?   N           If yes, did it require cardiac stenting or other implantable device?   N      21.   Do you have any implantable devices in your body (pacemaker, defib, LVAD)? (If yes, please review exclusion criteria)  N     22. Do you take nitroglycerin?   N           If yes, how often? NA  (if yes, HOSPITAL setting ONLY)    23.  Are you currently taking any blood thinners?    [If yes, INFORM patient to follw up w/ ORDERING PROVIDER FOR BRIDGING INSTRUCTIONS]     Yes-Xarelto    24.   Do you transfer independently?                (If NO, please HOSPITAL setting ONLY)  Yes    25.   Preferred LOCAL Pharmacy for Pre Prescription:           [id number] - [place], [place] - [address] AT [facility] OF [address]    Scheduling Details  (Please ask for phone number if not scheduled by patient)      Caller : Teresa  Date of Procedure: 9/16/22  Surgeon: WONG Cano  [place] [facility]        Sedation Type: MAC l   Conscious Sedation- Needs  for 6 hours after the procedure  MAC/General-Needs  for 24 hours after procedure    NA :[Pre-op Required] at [facility]  [facility]  MG and OR for MAC sedation   (advise patient they will need a pre-op WITH IN 30 DAYS of procedure date)     Type of Procedure Scheduled:   Lower Endoscopy [Colonoscopy]    Which Colonoscopy Prep was Sent?:   Extended - per order    [facility] PATIENTS & [facility] PATIENTS NEEDS EXTENDED PREP       Informed patient they will need an adult  Yes  Cannot take any type of public or medical transportation alone    Pre-Procedure Covid test to be completed at [facility] or Externally: HOME  **INFORMED OF HOME TESTING & LAB OPTION**        Confirmed Nurse will call to complete assessment Yes    Additional comments:

## 2022-07-14 ENCOUNTER — ONCOLOGY VISIT (OUTPATIENT)
Dept: ONCOLOGY | Facility: CLINIC | Age: 47
End: 2022-07-14
Attending: PHYSICIAN ASSISTANT
Payer: COMMERCIAL

## 2022-07-14 ENCOUNTER — APPOINTMENT (OUTPATIENT)
Dept: LAB | Facility: CLINIC | Age: 47
End: 2022-07-14
Attending: PHYSICIAN ASSISTANT
Payer: COMMERCIAL

## 2022-07-14 VITALS
DIASTOLIC BLOOD PRESSURE: 89 MMHG | TEMPERATURE: 97.9 F | BODY MASS INDEX: 33.62 KG/M2 | WEIGHT: 247.9 LBS | OXYGEN SATURATION: 99 % | SYSTOLIC BLOOD PRESSURE: 136 MMHG | HEART RATE: 70 BPM | RESPIRATION RATE: 16 BRPM

## 2022-07-14 DIAGNOSIS — Z17.0 MALIGNANT NEOPLASM OF OVERLAPPING SITES OF LEFT BREAST IN FEMALE, ESTROGEN RECEPTOR POSITIVE (H): ICD-10-CM

## 2022-07-14 DIAGNOSIS — C79.51 METASTASIS TO BONE (H): ICD-10-CM

## 2022-07-14 DIAGNOSIS — C50.919 METASTATIC BREAST CANCER: ICD-10-CM

## 2022-07-14 DIAGNOSIS — C50.812 MALIGNANT NEOPLASM OF OVERLAPPING SITES OF LEFT BREAST IN FEMALE, ESTROGEN RECEPTOR POSITIVE (H): ICD-10-CM

## 2022-07-14 DIAGNOSIS — C79.51 SPINE METASTASIS: ICD-10-CM

## 2022-07-14 DIAGNOSIS — M79.661 PAIN OF RIGHT LOWER LEG: ICD-10-CM

## 2022-07-14 DIAGNOSIS — G89.3 CANCER ASSOCIATED PAIN: ICD-10-CM

## 2022-07-14 LAB
ALBUMIN SERPL-MCNC: 3.5 G/DL (ref 3.4–5)
ALP SERPL-CCNC: 112 U/L (ref 40–150)
ALT SERPL W P-5'-P-CCNC: 15 U/L (ref 0–50)
ANION GAP SERPL CALCULATED.3IONS-SCNC: 9 MMOL/L (ref 3–14)
AST SERPL W P-5'-P-CCNC: 11 U/L (ref 0–45)
BASOPHILS # BLD AUTO: 0 10E3/UL (ref 0–0.2)
BASOPHILS NFR BLD AUTO: 2 %
BILIRUB SERPL-MCNC: 0.3 MG/DL (ref 0.2–1.3)
BUN SERPL-MCNC: 12 MG/DL (ref 7–30)
CALCIUM SERPL-MCNC: 9.4 MG/DL (ref 8.5–10.1)
CEA SERPL-MCNC: 4.5 NG/ML
CHLORIDE BLD-SCNC: 110 MMOL/L (ref 94–109)
CO2 SERPL-SCNC: 25 MMOL/L (ref 20–32)
CREAT SERPL-MCNC: 0.75 MG/DL (ref 0.52–1.04)
EOSINOPHIL # BLD AUTO: 0.1 10E3/UL (ref 0–0.7)
EOSINOPHIL NFR BLD AUTO: 2 %
ERYTHROCYTE [DISTWIDTH] IN BLOOD BY AUTOMATED COUNT: 14.9 % (ref 10–15)
GFR SERPL CREATININE-BSD FRML MDRD: >90 ML/MIN/1.73M2
GLUCOSE BLD-MCNC: 139 MG/DL (ref 70–99)
HCT VFR BLD AUTO: 35.2 % (ref 35–47)
HGB BLD-MCNC: 11.7 G/DL (ref 11.7–15.7)
IMM GRANULOCYTES # BLD: 0 10E3/UL
IMM GRANULOCYTES NFR BLD: 0 %
LYMPHOCYTES # BLD AUTO: 1.2 10E3/UL (ref 0.8–5.3)
LYMPHOCYTES NFR BLD AUTO: 44 %
MCH RBC QN AUTO: 32.5 PG (ref 26.5–33)
MCHC RBC AUTO-ENTMCNC: 33.2 G/DL (ref 31.5–36.5)
MCV RBC AUTO: 98 FL (ref 78–100)
MONOCYTES # BLD AUTO: 0.2 10E3/UL (ref 0–1.3)
MONOCYTES NFR BLD AUTO: 6 %
NEUTROPHILS # BLD AUTO: 1.2 10E3/UL (ref 1.6–8.3)
NEUTROPHILS NFR BLD AUTO: 46 %
NRBC # BLD AUTO: 0 10E3/UL
NRBC BLD AUTO-RTO: 0 /100
PLATELET # BLD AUTO: 171 10E3/UL (ref 150–450)
POTASSIUM BLD-SCNC: 3.9 MMOL/L (ref 3.4–5.3)
PROT SERPL-MCNC: 7.7 G/DL (ref 6.8–8.8)
RBC # BLD AUTO: 3.6 10E6/UL (ref 3.8–5.2)
SODIUM SERPL-SCNC: 144 MMOL/L (ref 133–144)
WBC # BLD AUTO: 2.6 10E3/UL (ref 4–11)

## 2022-07-14 PROCEDURE — 82040 ASSAY OF SERUM ALBUMIN: CPT | Performed by: PHYSICIAN ASSISTANT

## 2022-07-14 PROCEDURE — 80053 COMPREHEN METABOLIC PANEL: CPT | Performed by: PHYSICIAN ASSISTANT

## 2022-07-14 PROCEDURE — G0463 HOSPITAL OUTPT CLINIC VISIT: HCPCS

## 2022-07-14 PROCEDURE — 86300 IMMUNOASSAY TUMOR CA 15-3: CPT | Performed by: PHYSICIAN ASSISTANT

## 2022-07-14 PROCEDURE — 82378 CARCINOEMBRYONIC ANTIGEN: CPT | Performed by: PHYSICIAN ASSISTANT

## 2022-07-14 PROCEDURE — 84999 UNLISTED CHEMISTRY PROCEDURE: CPT | Performed by: PHYSICIAN ASSISTANT

## 2022-07-14 PROCEDURE — 85025 COMPLETE CBC W/AUTO DIFF WBC: CPT | Performed by: PHYSICIAN ASSISTANT

## 2022-07-14 PROCEDURE — 36415 COLL VENOUS BLD VENIPUNCTURE: CPT | Performed by: PHYSICIAN ASSISTANT

## 2022-07-14 PROCEDURE — 99214 OFFICE O/P EST MOD 30 MIN: CPT | Performed by: PHYSICIAN ASSISTANT

## 2022-07-14 RX ORDER — METHOCARBAMOL 500 MG/1
500 TABLET, FILM COATED ORAL 3 TIMES DAILY
Qty: 30 TABLET | Refills: 0 | Status: SHIPPED | OUTPATIENT
Start: 2022-07-14 | End: 2022-08-16

## 2022-07-14 RX ORDER — OXYCODONE HYDROCHLORIDE 5 MG/1
5-10 TABLET ORAL EVERY 6 HOURS PRN
Qty: 90 TABLET | Refills: 0 | Status: SHIPPED | OUTPATIENT
Start: 2022-07-14 | End: 2022-08-16

## 2022-07-14 RX ORDER — GABAPENTIN 300 MG/1
CAPSULE ORAL
Qty: 180 CAPSULE | Refills: 2 | Status: SHIPPED | OUTPATIENT
Start: 2022-07-14 | End: 2022-11-11

## 2022-07-14 RX ORDER — MORPHINE SULFATE 15 MG/1
15 TABLET, FILM COATED, EXTENDED RELEASE ORAL EVERY 12 HOURS
Qty: 60 TABLET | Refills: 0 | Status: SHIPPED | OUTPATIENT
Start: 2022-07-14 | End: 2022-08-16

## 2022-07-14 ASSESSMENT — PAIN SCALES - GENERAL: PAINLEVEL: SEVERE PAIN (7)

## 2022-07-14 NOTE — NURSING NOTE
Chief Complaint   Patient presents with     Blood Draw     Labs drawn via  by RN. Vitals taken.     Labs drawn with  by RN. Vitals taken. Patient checked into next appointment.    Breanna Marshall RN

## 2022-07-14 NOTE — NURSING NOTE
Oncology Rooming Note    July 14, 2022 12:29 PM   Teresa Sanderson is a 46 year old female who presents for:    Chief Complaint   Patient presents with     Blood Draw     Labs drawn via  by RN. Vitals taken.     Oncology Clinic Visit     Breast cancer     Initial Vitals: /89 (BP Location: Right arm, Patient Position: Sitting, Cuff Size: Adult Large)   Pulse 70   Temp 97.9  F (36.6  C) (Oral)   Resp 16   Wt 112.4 kg (247 lb 14.4 oz)   SpO2 99%   BMI 33.62 kg/m   Estimated body mass index is 33.62 kg/m  as calculated from the following:    Height as of 7/8/22: 1.829 m (6').    Weight as of this encounter: 112.4 kg (247 lb 14.4 oz). Body surface area is 2.39 meters squared.  Severe Pain (7) Comment: Data Unavailable   No LMP recorded. (Menstrual status: Tubal ).  Allergies reviewed: Yes  Medications reviewed: Yes    Medications: MEDICATION REFILLS NEEDED TODAY. Provider was notified. Pt would like Gabapentin, Oxycodone, and Robaxin refilled.    Pharmacy name entered into EPIC:    [facility] - [place], [place] - [address] [facility]  [id number] - [place], [place] - [address] [facility] - [place], [place] - [address] [facility]  [facility] - [place], [place] - [address]   [facility] - [place], [place] - [address] [facility]    Clinical concerns: Pt would like to know if she can get a higher dosage on her pain medication. She currently has to double up on taking more medication due to increased pain.       Abimbola Frank

## 2022-07-14 NOTE — LETTER
7/14/2022         RE: Teresa Sanderson  [address] [facility] [address]      Oncology Visit:   Date on this visit: Jul 14, 2022    Diagnosis:  ER positive left breast cancer metastasized to bones.     Primary Physician: No Ref-Primary, Physician     History Of Present Illness:    Ms. Sanderson is a 46 year old female with a h/o tobacco abuse and DVTs with left breast cancer metastasized to bone. She presented to [place] ED with back pain on 12/5/2020. MRI of the L-spine showed an abnormal L4 lesion with associated right paraspinal mass, abnormalities in L5 and the left iliac bone were also seen. CT C/A/P showed a left breast mass, lytic lesions of T7, L4, and the pelvis, and a 3 cm lesion in the kidney (thought to be a cyst). Ultrasound of the bilateral lower extremities showed a non-occlusive thrombus in the left popliteal vein. Mammogram and ultrasound of the bilateral breasts on 12/17/2020 showed a spiculated mass measuring at least 7.8 cm at 12-1:00 left breast extending from the nipple to 9 cm from the nipple with associated nipple retraction. This mass was biopsied, and showed IDC with surrounding DCIS, grade 3, ER+ 90%, and ME+ 75%.  HER2 was equivocal in approximately 35% of tumor cells by FISH and was negative by IHC.    Metastatic Breast Cancer Treatment:  12/23/2021 - 1/7/2021  Radiation (3000 cGy) to the lumbar spine.  1/29/2021 - present  Ibrance, zoladex, and anastrozole.  5/17/2021 radiofrequency ablation, kyphoplasty to L4  8/20/2021  Bilateral salpingo-oophorectomies, Ibrance and anastrozole.  She was off anastrozole 12/2021 - 2/2022 and off Ibrance 01/2022 - 02/2022 due to stress and impending homelessness. Off both again 03/2022-04/2022 due to death of her son but restarted in 05/2022.    Interval History: Teresa has been feeling okay. She is back on ibrance and anastrozole. Since resuming she has felt overall body stiffness in the morning and when she is sedentary. Since her fall  her back pain remains increased--however she notes really since her diagnosis her back pain has not improved. She needs refills on her pain medication today. Pain is isolated to low back. No leg weakness or radiating pain.     Appetite is intact. No nausea. Bowels are regular. She is fatigued. No fevers/chills or infectious concerns.     Mood has been okay though she is actively grieving the loss of her son.     She is looking for independent housing and would like to talk to a .       Past Medical/Surgical History:   Past Medical History:   Diagnosis Date     Anxiety      Breast CA (H) 12/2020     Depression      DVT (deep venous thrombosis) (H) 2014     Left breast mass     x approximately 4-5 months     Pulmonary emboli (H)      Pyelonephritis      Schizoaffective disorder (H)      Tobacco use      Past Surgical History:   Procedure Laterality Date     COLONOSCOPY N/A 7/8/2022    Procedure: COLONOSCOPY, WITH POLYPECTOMY;  Surgeon: Ham Cano MD;  Location: [facility] OR     IR LUMBAR KYPHOPLASTY VERTEBRAE  5/17/2021     LAPAROSCOPIC SALPINGO-OOPHORECTOMY Bilateral 8/20/2021    Procedure: BILATERAL SALPINGO-OOPHORECTOMY, LAPAROSCOPIC;  Surgeon: Rory Lopez MD;  Location: [facility] OR     TUBAL LIGATION  1998        Allergies   Allergen Reactions     Contrast Dye      Pt developed nausea after isovue 370 injection on 6/9/21        Current Outpatient Medications   Medication     gabapentin (NEURONTIN) 300 MG capsule     methocarbamol (ROBAXIN) 500 MG tablet     morphine (MS CONTIN) 15 MG CR tablet     oxyCODONE (ROXICODONE) 5 MG tablet     anastrozole (ARIMIDEX) 1 MG tablet     bisacodyl (DULCOLAX) 5 MG EC tablet     diclofenac (VOLTAREN) 1 % topical gel     ibuprofen (ADVIL/MOTRIN) 800 MG tablet     magnesium citrate solution     methylPREDNISolone (MEDROL) 32 MG tablet     naloxone (NARCAN) 4 MG/0.1ML nasal spray     nicotine (COMMIT) 2 MG lozenge     nicotine (NICORETTE) 2 MG gum      OLANZapine (ZYPREXA) 2.5 MG tablet     palbociclib (IBRANCE) 125 MG tablet     pantoprazole (PROTONIX) 40 MG EC tablet     polyethylene glycol (GOLYTELY) 236 g suspension     polyethylene glycol (MIRALAX) 17 GM/Dose powder     rivaroxaban ANTICOAGULANT (XARELTO) 20 MG TABS tablet     SENNA-docusate sodium (SENNA S) 8.6-50 MG tablet     sennosides (SENOKOT) 8.6 MG tablet     sertraline (ZOLOFT) 100 MG tablet     sertraline (ZOLOFT) 50 MG tablet     tolnaftate (TINACTIN) 1 % external cream     Vitamin D3 (CHOLECALCIFEROL) 25 mcg (1000 units) tablet     No current facility-administered medications for this visit.   '  Physical Exam:   /89 (BP Location: Right arm, Patient Position: Sitting, Cuff Size: Adult Large)   Pulse 70   Temp 97.9  F (36.6  C) (Oral)   Resp 16   Wt 112.4 kg (247 lb 14.4 oz)   SpO2 99%   BMI 33.62 kg/m    General:  Pleasant adult female in NAD.  Alert and oriented.  HEENT:  Normocephalic.  Sclera anicteric. Dentures in place. Enlarged thyroid on exam.  Lymph:  No palpable cervical, supraclavicular, or axillary LAD.  Chest:  CTA bilaterally.  No wheezes or crackles.  Breast: Deferred   CV:  RRR.  Nl S1 and S2.    Abd:  Soft/NT/ND. + BS   Ext:  No pitting edema of the bilateral lower extremities.    Musculo:  Strength 5/5 throughout.  Good movement of all 4 extremities.  Neuro:  Cranial nerves grossly intact.  Gait is stable.  Psych:  Mood and affect appear normal.    Laboratory/Imaging Studies:    07/14/22 11:58   Sodium 144   Potassium 3.9   Chloride 110 (H)   Carbon Dioxide 25   Urea Nitrogen 12   Creatinine 0.75   GFR Estimate >90   Calcium 9.4   Anion Gap 9   Albumin 3.5   Protein Total 7.7   Alkaline Phosphatase 112   ALT 15   AST 11   Bilirubin Total 0.3   Glucose 139 (H)   WBC 2.6 (L)   Hemoglobin 11.7   Hematocrit 35.2   Platelet Count 171   RBC Count 3.60 (L)   MCV 98   MCH 32.5   MCHC 33.2   RDW 14.9   % Neutrophils 46   % Lymphocytes 44   % Monocytes 6   % Eosinophils 2   %  Basophils 2   Absolute Basophils 0.0   Absolute Eosinophils 0.1   Absolute Immature Granulocytes 0.0   Absolute Lymphocytes 1.2   Absolute Monocytes 0.2   % Immature Granulocytes 0   Absolute Neutrophils 1.2 (L)   Absolute NRBCs 0.0   NRBCs per 100 WBC 0           ASSESSMENT/PLAN:   46 year old female with history of DVT and left breast invasive ductal carcinoma, ER/WI positive, HER2 negative metastasized to bones.    1.  Metastatic breast cancer: Her PET CT from 6/9/22 shows increased intensity in her left breast and rectosigmoid junction. Given over the past 6 months she has been off recommended for at least 3 months, it is unclear whether progression is secondary to resistance vs non-compliance. We therefore recommend continuing the current therapy with repeat imaging in 2 months to assess the efficacy of this regimen.  Due to contrast allergy, I recommend she take methylprednisolone prior to the scan. She had a colonoscopy revealing two polyps which were resected.  -She is overall tolerating the medication well but I suspect some joint stiffness is related to getting back on anastrozole.   -Scans next month.    If next imaging shows disease progression, would consider next line treatment with abemaciclib and fulvestrant.    2.  Schizoaffective disorder, bipolar type: She is on treatment with Zoloft and Zyprexa.  Ongoing follow up with psychiatry.  She has increased anxiety around scans.    3.  Bone metastases/back pain:  She is s/p XRT to the lumbar spine and kyphoplasty of L4.  She is taking MS contin 15 mg PO BID and oxycodone and ibu profen prn.  She also takes methocarbamol prn muscle spasms. All were refilled today.     On Zometa since 1/18/2021 and is receiving once every 3 months. Next dose will be due around 8/11/2022.    4.  DVT:  She confirms that she has continued on Xarelto.  Recommend continuing Xarelto until contraindicated given metastatic disease.      5.  Enlarged thyroid: Thyroid tests WNL last  month. Will follow-up on thyroid on PET next month and obtain US if needed.     RICHARD Kim PA-C

## 2022-07-15 LAB
Lab: NORMAL
PERFORMING LABORATORY: NORMAL
SPECIMEN STATUS: NORMAL
TEST NAME: NORMAL

## 2022-07-18 ENCOUNTER — PATIENT OUTREACH (OUTPATIENT)
Dept: CARE COORDINATION | Facility: CLINIC | Age: 47
End: 2022-07-18

## 2022-07-18 NOTE — PROGRESS NOTES
Social Work Intervention  [facility]    Data/Intervention:    Patient Name:  Teresa Sanderson  /Age:  1975 (46 year old)    Visit Type: telephone  Referral Source: Patient  Reason for Referral:  Paperwork for Housing    Collaborated With:    -Patient    Psychosocial Information/Concerns:  SW covering for EUGENE Puckett. Patient left Jo a VM regarding paperwork needing to be completed by care team for housing.     Intervention/Education/Resources Provided:  SW called patient back, introduced self and explained the reason for calling. SW explained that Jo is currently out of the office and will return tomorrow (2022). Patient reported that she has some paperwork that needs to be completed by her care team to help secure housing. SW provided patient with [employer]'s e-mail. SW suggested patient to e-mail paperwork to [employer] who then will pass it on to Jo tomorrow when they return to clinic and ask them to follow up. Patient verbalized understanding.     Assessment/Plan:  SW will wait to receive e-mail from patient and pass along update/paperwork to EUGENE Puckett when they return to the clinic. SW will continue to remain available as needed. Provided patient/family with contact information and availability.    DANA Bernardo,EUGENE  Hematology/Oncology Social Worker  Phone:[phone] Pager: [phone]

## 2022-07-20 LAB — MAYO MISC RESULT: ABNORMAL

## 2022-07-21 ENCOUNTER — PATIENT OUTREACH (OUTPATIENT)
Dept: CARE COORDINATION | Facility: CLINIC | Age: 47
End: 2022-07-21

## 2022-08-01 ENCOUNTER — HOSPITAL ENCOUNTER (EMERGENCY)
Facility: CLINIC | Age: 47
Discharge: LEFT BEFORE DISCHARGE | End: 2022-08-01
Payer: COMMERCIAL

## 2022-08-01 ENCOUNTER — HOSPITAL ENCOUNTER (EMERGENCY)
Facility: CLINIC | Age: 47
Discharge: LEFT BEFORE DISCHARGE | End: 2022-08-01
Admitting: EMERGENCY MEDICINE
Payer: COMMERCIAL

## 2022-08-01 VITALS
DIASTOLIC BLOOD PRESSURE: 80 MMHG | OXYGEN SATURATION: 97 % | SYSTOLIC BLOOD PRESSURE: 123 MMHG | HEART RATE: 68 BPM | TEMPERATURE: 99 F | RESPIRATION RATE: 16 BRPM

## 2022-08-01 LAB
FLUAV RNA SPEC QL NAA+PROBE: NEGATIVE
FLUBV RNA RESP QL NAA+PROBE: NEGATIVE
SARS-COV-2 RNA RESP QL NAA+PROBE: POSITIVE

## 2022-08-01 PROCEDURE — 250N000013 HC RX MED GY IP 250 OP 250 PS 637: Performed by: FAMILY MEDICINE

## 2022-08-01 PROCEDURE — 999N000104 HC STATISTIC NO CHARGE: Performed by: EMERGENCY MEDICINE

## 2022-08-01 PROCEDURE — C9803 HOPD COVID-19 SPEC COLLECT: HCPCS | Performed by: EMERGENCY MEDICINE

## 2022-08-01 PROCEDURE — 87636 SARSCOV2 & INF A&B AMP PRB: CPT | Performed by: FAMILY MEDICINE

## 2022-08-01 RX ORDER — ACETAMINOPHEN 325 MG/1
975 TABLET ORAL ONCE
Status: COMPLETED | OUTPATIENT
Start: 2022-08-01 | End: 2022-08-01

## 2022-08-01 RX ADMIN — ACETAMINOPHEN 975 MG: 325 TABLET ORAL at 15:44

## 2022-08-01 NOTE — ED TRIAGE NOTES
Daughter tested positive for COVID. Reports headache and body aches.      Triage Assessment     Row Name 08/01/22 [id number]       Triage Assessment (Adult)    Airway WDL WDL       Respiratory WDL    Respiratory WDL WDL       Skin Circulation/Temperature WDL    Skin Circulation/Temperature WDL WDL       Cardiac WDL    Cardiac WDL WDL       Peripheral/Neurovascular WDL    Peripheral Neurovascular WDL WDL       Cognitive/Neuro/Behavioral WDL    Cognitive/Neuro/Behavioral WDL WDL

## 2022-08-02 NOTE — ED NOTES
Pt called to be roomed in , but patient not available, called 2x. Looked around the ED but unable to locate

## 2022-08-03 ENCOUNTER — TELEPHONE (OUTPATIENT)
Dept: ONCOLOGY | Facility: CLINIC | Age: 47
End: 2022-08-03

## 2022-08-03 NOTE — TELEPHONE ENCOUNTER
Oral Chemotherapy Monitoring Program    Subjective/Objective:  Teresa Sanderson is a 46 year old female contacted by phone for a follow-up visit for oral chemotherapy palbociclib. Unable to execute monthly assessment due to the patient feeling unwell. She was recently diagnosed with COVID-19 on 8/1/22. Nurse triage was contacted to follow up with her and assess her symptoms.     ORAL CHEMOTHERAPY 12/23/2021 12/31/2021 2/28/2022 3/14/2022 5/23/2022 6/4/2022 8/3/2022   Assessment Type Refill Chart Review - Refill Refill Monthly Follow up Monthly Follow up   Diagnosis Code Breast Cancer Breast Cancer Breast Cancer Breast Cancer Breast Cancer Breast Cancer Breast Cancer   Providers Dr. Dimitrios Plunkett   [facility]   Drug Name Ibrance (palbociclib) Ibrance (palbociclib) Ibrance (palbociclib) Ibrance (palbociclib) Ibrance (palbociclib) Ibrance (palbociclib) Ibrance (palbociclib)   Dose - 125 mg 125 mg 125 mg 125 mg 125 mg -   Current Schedule - Daily Daily Daily - Daily -   Cycle Details - 3 weeks on, 1 week off 3 weeks on, 1 week off 3 weeks on, 1 week off 3 weeks on, 1 week off 3 weeks on, 1 week off -   Start Date of Last Cycle - - 2/20/2022 - - 5/18/2022 -   Planned next cycle start date - - - - - 6/18/2022 -   Doses missed in last 2 weeks - - 0 - - 3 -   Adherence Assessment - - Adherent - - Non-adherent -   Reason for Non-adherence - - - - - Other -   Adherence Intervention Recommended - - - - - None -   Adverse Effects - - No AE identified during assessment - - No AE identified during assessment -   Any new drug interactions? - - No - - - -   Pharmacist Intervention? - - - - - - -   Intervention(s) - - - - - - -   Is the dose as ordered appropriate for the patient? - - Yes - - - -   Since the last time we talked, have you been hospitalized or used the emergency room? - - No - - -  -       Last PHQ-2 Score on record:   PHQ-2 ( 1999 Pfizer) 5/13/2021   Q1: Little interest or pleasure in doing things 1   Q2: Feeling down, depressed or hopeless 1   PHQ-2 Score 2   PHQ-2 Total Score (12-17 Years)- Positive if 3 or more points; Administer PHQ-A if positive 2       Vitals:  BP:   BP Readings from Last 1 Encounters:   07/14/22 136/89     Wt Readings from Last 1 Encounters:   07/14/22 112.4 kg (247 lb 14.4 oz)     Estimated body surface area is 2.39 meters squared as calculated from the following:    Height as of 7/8/22: 1.829 m (6').    Weight as of 7/14/22: 112.4 kg (247 lb 14.4 oz).    Labs:  _  Result Component Current Result Ref Range   Sodium 144 (7/14/2022) 133 - 144 mmol/L     _  Result Component Current Result Ref Range   Potassium 3.9 (7/14/2022) 3.4 - 5.3 mmol/L     _  Result Component Current Result Ref Range   Calcium 9.4 (7/14/2022) 8.5 - 10.1 mg/dL     No results found for Mag within last 30 days.     No results found for Phos within last 30 days.     _  Result Component Current Result Ref Range   Albumin 3.5 (7/14/2022) 3.4 - 5.0 g/dL     _  Result Component Current Result Ref Range   Urea Nitrogen 12 (7/14/2022) 7 - 30 mg/dL     _  Result Component Current Result Ref Range   Creatinine 0.75 (7/14/2022) 0.52 - 1.04 mg/dL     _  Result Component Current Result Ref Range   AST 11 (7/14/2022) 0 - 45 U/L     _  Result Component Current Result Ref Range   ALT 15 (7/14/2022) 0 - 50 U/L     _  Result Component Current Result Ref Range   Bilirubin Total 0.3 (7/14/2022) 0.2 - 1.3 mg/dL     _  Result Component Current Result Ref Range   WBC Count 2.6 (L) (7/14/2022) 4.0 - 11.0 10e3/uL     _  Result Component Current Result Ref Range   Hemoglobin 11.7 (7/14/2022) 11.7 - 15.7 g/dL     _  Result Component Current Result Ref Range   Platelet Count 171 (7/14/2022) 150 - 450 10e3/uL     No results found for ANC within last 30 days.     _  Result Component Current Result Ref Range   Absolute  Neutrophils 1.2 (L) (7/14/2022) 1.6 - 8.3 10e3/uL      Assessment/Plan:  To be determined after nurse follow up.     Follow-Up:  Will be following chart review to determine status of patient and palbociclib.     Refill Due:  8/15/22    Megan Green  Pharmacy Intern  Oral Chemotherapy Monitoring Program   [facility]  [phone]

## 2022-08-03 NOTE — TELEPHONE ENCOUNTER
COVID Assessment    S: Tested positive for covid on 8/1/22    B: Exposed to covid by her daughter who was visiting from [place] and tested positive for covid. Daughter was symptomatic; thought she caught a cold on the bus traveling here.  Pt went to  ED on 8/1/22. She did not get antiviral medication because she didn't stay due to the long wait; she waited from 1145-6 p.m. and didn't get past triage. Had no sx except headache. After ED staff gave her tylenol, the headache went away and she went home.     A: Symptom Assessment:  Sweating at night x 2 nights, chills, fever--not recorded.  Slight sore throat,   Small cough, it hurts to cough, back hurts when she coughs, coughing very small amount light green/gray phlegm  SOB with activity like walking to bathroom.  Nose is stuffed and then running.  No HA,   Muscle and body aches.  Fatigued.  Diarrhea--had gas x 3 days and diarrhea started yesterday.    Taking tylenol cold and flu which helps    Upcoming Treatment/Appointments Pt is on Ibrance, second box.    R: Recommendations:  Reviewed sx that pt should go to ED right away for:  Trouble breathing  Persistent pain or pressure in the chest  New confusion  Inability to wake or stay awake  Pale, gray, or blue-colored skin, lips, or nail beds, depending on skin tone  Call your medical provider for any other symptoms that are severe or concerning to you.    Continue supportive treatment: use tylenol for ha, body aches, push fluids, rest, monitor for worsening sx and report to Triage.    Informed pt that this RN will speak with pt's Care Team to see if Ibrance needs to be held, and call pt back.    Transferred pt to [facility] to schedule a virtual visit for consideration of antiviral treatment. Explained that antivirals have to be started within the first 5 days of symptom onset.    Messaged [facility] to see if pt should hold Ibrance.   Advised Dr. Plunkett be contacted to give direction.  Message routed high priority to   Dimitrios.    Follow up:

## 2022-08-04 ENCOUNTER — MYC MEDICAL ADVICE (OUTPATIENT)
Dept: ONCOLOGY | Facility: CLINIC | Age: 47
End: 2022-08-04

## 2022-08-04 NOTE — TELEPHONE ENCOUNTER
Per Dr. Plunkett, recommends she hold Ibrance for one week.    Sent MyC message to pt with instructions to hold Ibrance and to call back or respond to myc message to let us know that she received the information.

## 2022-08-12 ENCOUNTER — HOSPITAL ENCOUNTER (OUTPATIENT)
Dept: PET IMAGING | Facility: CLINIC | Age: 47
Discharge: HOME | End: 2022-08-12
Attending: INTERNAL MEDICINE | Admitting: INTERNAL MEDICINE
Payer: COMMERCIAL

## 2022-08-12 DIAGNOSIS — C50.912 CARCINOMA OF LEFT BREAST METASTATIC TO BONE (H): ICD-10-CM

## 2022-08-12 DIAGNOSIS — C79.51 CARCINOMA OF LEFT BREAST METASTATIC TO BONE (H): ICD-10-CM

## 2022-08-12 PROCEDURE — A9552 F18 FDG: HCPCS | Performed by: INTERNAL MEDICINE

## 2022-08-12 PROCEDURE — 78816 PET IMAGE W/CT FULL BODY: CPT | Mod: PS

## 2022-08-12 PROCEDURE — 343N000001 HC RX 343: Performed by: INTERNAL MEDICINE

## 2022-08-12 PROCEDURE — 78816 PET IMAGE W/CT FULL BODY: CPT | Mod: 26

## 2022-08-12 RX ADMIN — FLUDEOXYGLUCOSE F-18 14.49 MCI.: 500 INJECTION, SOLUTION INTRAVENOUS at 13:08

## 2022-08-16 ENCOUNTER — ONCOLOGY VISIT (OUTPATIENT)
Dept: ONCOLOGY | Facility: CLINIC | Age: 47
End: 2022-08-16
Attending: PHYSICIAN ASSISTANT
Payer: COMMERCIAL

## 2022-08-16 ENCOUNTER — APPOINTMENT (OUTPATIENT)
Dept: LAB | Facility: CLINIC | Age: 47
End: 2022-08-16
Attending: PHYSICIAN ASSISTANT
Payer: COMMERCIAL

## 2022-08-16 VITALS
HEART RATE: 69 BPM | DIASTOLIC BLOOD PRESSURE: 102 MMHG | OXYGEN SATURATION: 100 % | SYSTOLIC BLOOD PRESSURE: 143 MMHG | RESPIRATION RATE: 16 BRPM | TEMPERATURE: 98.4 F

## 2022-08-16 DIAGNOSIS — C79.51 CARCINOMA OF LEFT BREAST METASTATIC TO BONE (H): ICD-10-CM

## 2022-08-16 DIAGNOSIS — F25.0 SCHIZOAFFECTIVE DISORDER, BIPOLAR TYPE (H): ICD-10-CM

## 2022-08-16 DIAGNOSIS — M54.50 CHRONIC BILATERAL LOW BACK PAIN WITHOUT SCIATICA: ICD-10-CM

## 2022-08-16 DIAGNOSIS — F29 PSYCHOSIS, UNSPECIFIED PSYCHOSIS TYPE (H): ICD-10-CM

## 2022-08-16 DIAGNOSIS — C50.912 CARCINOMA OF LEFT BREAST METASTATIC TO BONE (H): ICD-10-CM

## 2022-08-16 DIAGNOSIS — C50.919 METASTATIC BREAST CANCER: ICD-10-CM

## 2022-08-16 DIAGNOSIS — K21.9 GASTROESOPHAGEAL REFLUX DISEASE WITHOUT ESOPHAGITIS: ICD-10-CM

## 2022-08-16 DIAGNOSIS — Z17.0 MALIGNANT NEOPLASM OF OVERLAPPING SITES OF LEFT BREAST IN FEMALE, ESTROGEN RECEPTOR POSITIVE (H): ICD-10-CM

## 2022-08-16 DIAGNOSIS — G89.3 CANCER ASSOCIATED PAIN: ICD-10-CM

## 2022-08-16 DIAGNOSIS — C50.812 MALIGNANT NEOPLASM OF OVERLAPPING SITES OF LEFT BREAST IN FEMALE, ESTROGEN RECEPTOR POSITIVE (H): ICD-10-CM

## 2022-08-16 DIAGNOSIS — E04.2 MULTINODULAR THYROID: ICD-10-CM

## 2022-08-16 DIAGNOSIS — C50.919 METASTATIC BREAST CANCER: Primary | ICD-10-CM

## 2022-08-16 DIAGNOSIS — G89.29 CHRONIC BILATERAL LOW BACK PAIN WITHOUT SCIATICA: ICD-10-CM

## 2022-08-16 DIAGNOSIS — B35.3 TINEA PEDIS OF BOTH FEET: ICD-10-CM

## 2022-08-16 DIAGNOSIS — C79.51 SPINE METASTASIS: Primary | ICD-10-CM

## 2022-08-16 LAB
ALBUMIN SERPL-MCNC: 3.4 G/DL (ref 3.4–5)
ALP SERPL-CCNC: 108 U/L (ref 40–150)
ALT SERPL W P-5'-P-CCNC: 28 U/L (ref 0–50)
ANION GAP SERPL CALCULATED.3IONS-SCNC: 6 MMOL/L (ref 3–14)
AST SERPL W P-5'-P-CCNC: 19 U/L (ref 0–45)
BASOPHILS # BLD AUTO: 0.1 10E3/UL (ref 0–0.2)
BASOPHILS NFR BLD AUTO: 2 %
BILIRUB SERPL-MCNC: 0.2 MG/DL (ref 0.2–1.3)
BUN SERPL-MCNC: 8 MG/DL (ref 7–30)
CALCIUM SERPL-MCNC: 9 MG/DL (ref 8.5–10.1)
CANCER AG15-3 SERPL-ACNC: 40 U/ML
CEA SERPL-MCNC: 3.8 NG/ML
CHLORIDE BLD-SCNC: 111 MMOL/L (ref 94–109)
CO2 SERPL-SCNC: 27 MMOL/L (ref 20–32)
CREAT SERPL-MCNC: 0.66 MG/DL (ref 0.52–1.04)
EOSINOPHIL # BLD AUTO: 0.1 10E3/UL (ref 0–0.7)
EOSINOPHIL NFR BLD AUTO: 2 %
ERYTHROCYTE [DISTWIDTH] IN BLOOD BY AUTOMATED COUNT: 13.8 % (ref 10–15)
GFR SERPL CREATININE-BSD FRML MDRD: >90 ML/MIN/1.73M2
GLUCOSE BLD-MCNC: 89 MG/DL (ref 70–99)
HCT VFR BLD AUTO: 33 % (ref 35–47)
HGB BLD-MCNC: 11.1 G/DL (ref 11.7–15.7)
IMM GRANULOCYTES # BLD: 0 10E3/UL
IMM GRANULOCYTES NFR BLD: 1 %
LYMPHOCYTES # BLD AUTO: 1.3 10E3/UL (ref 0.8–5.3)
LYMPHOCYTES NFR BLD AUTO: 42 %
Lab: NORMAL
MCH RBC QN AUTO: 32.3 PG (ref 26.5–33)
MCHC RBC AUTO-ENTMCNC: 33.6 G/DL (ref 31.5–36.5)
MCV RBC AUTO: 96 FL (ref 78–100)
MONOCYTES # BLD AUTO: 0.2 10E3/UL (ref 0–1.3)
MONOCYTES NFR BLD AUTO: 6 %
NEUTROPHILS # BLD AUTO: 1.5 10E3/UL (ref 1.6–8.3)
NEUTROPHILS NFR BLD AUTO: 47 %
NRBC # BLD AUTO: 0 10E3/UL
NRBC BLD AUTO-RTO: 0 /100
PERFORMING LABORATORY: NORMAL
PLATELET # BLD AUTO: 191 10E3/UL (ref 150–450)
POTASSIUM BLD-SCNC: 3.3 MMOL/L (ref 3.4–5.3)
PROT SERPL-MCNC: 7.4 G/DL (ref 6.8–8.8)
RBC # BLD AUTO: 3.44 10E6/UL (ref 3.8–5.2)
SODIUM SERPL-SCNC: 144 MMOL/L (ref 133–144)
SPECIMEN STATUS: NORMAL
TEST NAME: NORMAL
WBC # BLD AUTO: 3.2 10E3/UL (ref 4–11)

## 2022-08-16 PROCEDURE — 999N000128 HC STATISTIC PERIPHERAL IV START W/O US GUIDANCE

## 2022-08-16 PROCEDURE — 82378 CARCINOEMBRYONIC ANTIGEN: CPT

## 2022-08-16 PROCEDURE — 36415 COLL VENOUS BLD VENIPUNCTURE: CPT | Performed by: PHYSICIAN ASSISTANT

## 2022-08-16 PROCEDURE — 85025 COMPLETE CBC W/AUTO DIFF WBC: CPT

## 2022-08-16 PROCEDURE — 99215 OFFICE O/P EST HI 40 MIN: CPT | Performed by: PHYSICIAN ASSISTANT

## 2022-08-16 PROCEDURE — 250N000011 HC RX IP 250 OP 636: Performed by: INTERNAL MEDICINE

## 2022-08-16 PROCEDURE — 999N000285 HC STATISTIC VASC ACCESS LAB DRAW WITH PIV START

## 2022-08-16 PROCEDURE — 86300 IMMUNOASSAY TUMOR CA 15-3: CPT | Performed by: PHYSICIAN ASSISTANT

## 2022-08-16 PROCEDURE — 96365 THER/PROPH/DIAG IV INF INIT: CPT

## 2022-08-16 PROCEDURE — 80053 COMPREHEN METABOLIC PANEL: CPT

## 2022-08-16 RX ORDER — ZOLEDRONIC ACID 0.04 MG/ML
4 INJECTION, SOLUTION INTRAVENOUS ONCE
Status: COMPLETED | OUTPATIENT
Start: 2022-08-16 | End: 2022-08-16

## 2022-08-16 RX ORDER — THERMOMETER, ELECTRONIC,ORAL
EACH MISCELLANEOUS 2 TIMES DAILY
Qty: 30 G | Refills: 1 | Status: SHIPPED | OUTPATIENT
Start: 2022-08-16 | End: 2023-12-07

## 2022-08-16 RX ORDER — SERTRALINE HYDROCHLORIDE 100 MG/1
TABLET, FILM COATED ORAL
Qty: 30 TABLET | Refills: 2 | Status: SHIPPED | OUTPATIENT
Start: 2022-08-16 | End: 2022-10-04

## 2022-08-16 RX ORDER — PANTOPRAZOLE SODIUM 40 MG/1
40 TABLET, DELAYED RELEASE ORAL
Qty: 30 TABLET | Refills: 1 | Status: SHIPPED | OUTPATIENT
Start: 2022-08-16 | End: 2024-07-20

## 2022-08-16 RX ORDER — METHOCARBAMOL 500 MG/1
500 TABLET, FILM COATED ORAL 3 TIMES DAILY PRN
Qty: 30 TABLET | Refills: 0 | Status: SHIPPED | OUTPATIENT
Start: 2022-08-16 | End: 2022-11-11

## 2022-08-16 RX ORDER — MORPHINE SULFATE 15 MG/1
15 TABLET, FILM COATED, EXTENDED RELEASE ORAL EVERY 12 HOURS
Qty: 60 TABLET | Refills: 0 | Status: SHIPPED | OUTPATIENT
Start: 2022-08-16 | End: 2022-10-17

## 2022-08-16 RX ORDER — OXYCODONE HYDROCHLORIDE 5 MG/1
5-10 TABLET ORAL EVERY 6 HOURS PRN
Qty: 90 TABLET | Refills: 0 | Status: SHIPPED | OUTPATIENT
Start: 2022-08-16 | End: 2022-10-17

## 2022-08-16 RX ORDER — ANASTROZOLE 1 MG/1
1 TABLET ORAL DAILY
Qty: 90 TABLET | Refills: 3 | Status: SHIPPED | OUTPATIENT
Start: 2022-08-16 | End: 2022-08-22

## 2022-08-16 RX ADMIN — ZOLEDRONIC ACID 4 MG: 0.04 INJECTION, SOLUTION INTRAVENOUS at 13:19

## 2022-08-16 ASSESSMENT — PAIN SCALES - GENERAL: PAINLEVEL: SEVERE PAIN (7)

## 2022-08-16 NOTE — LETTER
"    8/16/2022         RE: Teresa Sanderson  [address] [facility] [address]      Oncology Visit:   Date on this visit: Aug 16, 2022    Diagnosis:  ER positive left breast cancer metastasized to bones.     Primary Physician: No Ref-Primary, Physician     History Of Present Illness:    Ms. Sanderson is a 46 year old female with a h/o tobacco abuse and DVTs with left breast cancer metastasized to bone. She presented to [facility] ED with back pain on 12/5/2020. MRI of the L-spine showed an abnormal L4 lesion with associated right paraspinal mass, abnormalities in L5 and the left iliac bone were also seen. CT C/A/P showed a left breast mass, lytic lesions of T7, L4, and the pelvis, and a 3 cm lesion in the kidney (thought to be a cyst). Ultrasound of the bilateral lower extremities showed a non-occlusive thrombus in the left popliteal vein. Mammogram and ultrasound of the bilateral breasts on 12/17/2020 showed a spiculated mass measuring at least 7.8 cm at 12-1:00 left breast extending from the nipple to 9 cm from the nipple with associated nipple retraction. This mass was biopsied, and showed IDC with surrounding DCIS, grade 3, ER+ 90%, and AK+ 75%.  HER2 was equivocal in approximately 35% of tumor cells by FISH and was negative by IHC.    Metastatic Breast Cancer Treatment:  12/23/2021 - 1/7/2021  Radiation (3000 cGy) to the lumbar spine.  1/29/2021 - present  Ibrance, zoladex, and anastrozole.  5/17/2021 radiofrequency ablation, kyphoplasty to L4  8/20/2021  Bilateral salpingo-oophorectomies, Ibrance and anastrozole.  She was off anastrozole 12/2021 - 2/2022 and off Ibrance 01/2022 - 02/2022 due to stress and impending homelessness. Off both again 03/2022-04/2022 due to death of her son but restarted in 05/2022.    Interval History:  Teresa has been stressed. Her son was \"jumped\" in [place] last night and suffered multiple head injuries. He is currently admitted with brain swelling. She is going to go see " him later today. She was diagnosed with COVID on 8/1 after being exposed to her daughter. Her only symptom was a headache for 1 day. She did hold ibrance for a week. She had a negative antigen test on 8/12.     She has continued to have a lot of lower back pain. She continues to take MS contin BID and oxycodone TID. She uses tylenol and ibuprofen as well. She is happy PET looks good. She wants to be more active but feels limited by pain. Admits taking oxycodone does relieve pain slightly but does not bring pain down a significant degree.     No fevers/chills, cough, SOB, nausea, bowel issues.       Past Medical/Surgical History:   Past Medical History:   Diagnosis Date     Anxiety      Breast CA (H) 12/2020     Depression      DVT (deep venous thrombosis) (H) 2014     Left breast mass     x approximately 4-5 months     Pulmonary emboli (H)      Pyelonephritis      Schizoaffective disorder (H)      Tobacco use      Past Surgical History:   Procedure Laterality Date     COLONOSCOPY N/A 7/8/2022    Procedure: COLONOSCOPY, WITH POLYPECTOMY;  Surgeon: Ham Cano MD;  Location: [facility] OR     IR LUMBAR KYPHOPLASTY VERTEBRAE  5/17/2021     LAPAROSCOPIC SALPINGO-OOPHORECTOMY Bilateral 8/20/2021    Procedure: BILATERAL SALPINGO-OOPHORECTOMY, LAPAROSCOPIC;  Surgeon: Rory Lopez MD;  Location: [facility] OR     TUBAL LIGATION  1998        Allergies   Allergen Reactions     Contrast Dye      Pt developed nausea after isovue 370 injection on 6/9/21        Current Outpatient Medications   Medication     anastrozole (ARIMIDEX) 1 MG tablet     bisacodyl (DULCOLAX) 5 MG EC tablet     magnesium citrate solution     methocarbamol (ROBAXIN) 500 MG tablet     morphine (MS CONTIN) 15 MG CR tablet     nicotine (COMMIT) 2 MG lozenge     nicotine (NICORETTE) 2 MG gum     OLANZapine (ZYPREXA) 2.5 MG tablet     oxyCODONE (ROXICODONE) 5 MG tablet     palbociclib (IBRANCE) 125 MG tablet     pantoprazole (PROTONIX) 40 MG EC tablet      polyethylene glycol (GOLYTELY) 236 g suspension     polyethylene glycol (MIRALAX) 17 GM/Dose powder     rivaroxaban ANTICOAGULANT (XARELTO) 20 MG TABS tablet     SENNA-docusate sodium (SENNA S) 8.6-50 MG tablet     sennosides (SENOKOT) 8.6 MG tablet     sertraline (ZOLOFT) 100 MG tablet     sertraline (ZOLOFT) 50 MG tablet     tolnaftate (TINACTIN) 1 % external cream     Vitamin D3 (CHOLECALCIFEROL) 25 mcg (1000 units) tablet     diclofenac (VOLTAREN) 1 % topical gel     gabapentin (NEURONTIN) 300 MG capsule     ibuprofen (ADVIL/MOTRIN) 800 MG tablet     methylPREDNISolone (MEDROL) 32 MG tablet     naloxone (NARCAN) 4 MG/0.1ML nasal spray     No current facility-administered medications for this visit.     Facility-Administered Medications Ordered in Other Visits   Medication     zoledronic acid (ZOMETA) intermittent infusion 4 mg   '  Physical Exam:   BP (!) 143/102 (BP Location: Right arm, Patient Position: Sitting, Cuff Size: Adult Large)   Pulse 69   Temp 98.4  F (36.9  C) (Oral)   Resp 16   SpO2 100%    Wt Readings from Last 4 Encounters:   07/14/22 112.4 kg (247 lb 14.4 oz)   07/08/22 110.2 kg (243 lb)   06/14/22 110.5 kg (243 lb 9.6 oz)   05/13/22 110.9 kg (244 lb 6.4 oz)     General:  Pleasant adult female in NAD.  Alert and oriented.  HEENT:  Normocephalic.  Sclera anicteric. Enlarged thyroid on exam.  Lymph:  No palpable cervical, supraclavicular, or axillary LAD.  Chest:  CTA bilaterally.  No wheezes or crackles.  Breast: Deferred   CV:  RRR.  Nl S1 and S2.    Abd:  Soft/NT/ND. + BS   Ext:  No pitting edema of the bilateral lower extremities.    Musculo:  Strength 5/5 throughout.  Good movement of all 4 extremities.  Neuro:  Cranial nerves grossly intact.  Gait is stable.  Psych:  Mood and affect appear normal.    Laboratory/Imaging Studies:       08/16/22 12:03   Sodium 144   Potassium 3.3 (L)   Chloride 111 (H)   Carbon Dioxide 27   Urea Nitrogen 8   Creatinine 0.66   GFR Estimate >90   Calcium 9.0    Anion Gap 6   Albumin 3.4   Protein Total 7.4   Alkaline Phosphatase 108   ALT 28   AST 19   Bilirubin Total 0.2   Glucose 89   WBC 3.2 (L)   Hemoglobin 11.1 (L)   Hematocrit 33.0 (L)   Platelet Count 191   RBC Count 3.44 (L)   MCV 96   MCH 32.3   MCHC 33.6   RDW 13.8   % Neutrophils 47   % Lymphocytes 42   % Monocytes 6   % Eosinophils 2   % Basophils 2   Absolute Basophils 0.1   Absolute Eosinophils 0.1   Absolute Immature Granulocytes 0.0   Absolute Lymphocytes 1.3   Absolute Monocytes 0.2   % Immature Granulocytes 1   Absolute Neutrophils 1.5 (L)   Absolute NRBCs 0.0   NRBCs per 100 WBC 0   Tumor markers pending    PET/CT 8/12  IMPRESSION: In this patient with a history of metastatic breast  cancer, the findings suggest stable disease:  1. Unchanged hypermetabolic soft tissue nodule in the left breast  adjacent to the surgical clip. Findings remain suspicious for local  recurrent tumor. No new hypermetabolic disease in the body.  2. Stable non-FDG avid sclerotic lesions in the spine and pelvis.  3. Multinodular thyroid gland with new FDG uptake in the superior  portion of the left lobe. Consider dedicated thyroid ultrasound for  further evaluation.    ASSESSMENT/PLAN:   46 year old female with history of DVT and left breast invasive ductal carcinoma, ER/GA positive, HER2 negative metastasized to bones.    1.  Metastatic breast cancer: Her PET CT from 6/9/22 showed increased intensity in her left breast and rectosigmoid junction. She had been off treatment for 3 months surrounding that time. She had a colonoscopy revealing two polyps which were resected. Close follow-up PET was done last week showing stable L breast uptake which is stable otherwise MAGDI.  -Continue palbociclib and anastrozole  -Continue monthly follow-up and labs    -Next line treatment with abemaciclib and fulvestrant.    2.  Schizoaffective disorder, bipolar type: She is on treatment with Zoloft and Zyprexa.  Ongoing follow up with psychiatry.   She has increased anxiety around scans.  -Increased stress with family illness. Provided support.     3.  Bone metastases/back pain:  She is s/p XRT to the lumbar spine and kyphoplasty of L4.  She is taking MS contin 15 mg PO BID and oxycodone and ibu profen prn.  She also takes methocarbamol prn muscle spasms. All were refilled today.   -Reviewed with her scans showing MAGDI in bones I would expect her pain from bone metastases to slowly improve with time. She did have a fall in June however there is no fracture or subacute trauma showing on CT. From an oncology perspective, we should be able to wean narcotics. I will refer her to PT and ortho to see if there is any DDD issue that may be contributing that could be optimized. Otherwise will start to wean narcotics next month. Discussed this with her and she is agreeable.     On Zometa since 1/18/2021 and is receiving once every 3 months. Next dose today.     4.  DVT:  She confirms that she has continued on Xarelto.  Recommend continuing Xarelto until contraindicated given metastatic disease.      5.  Enlarged thyroid: Thyroid tests WNL last month. PET showed a metabolic focus in L lobe. Will order thyroid US to further evaluate.     Greater than 40 minutes was spent with this patient with greater than 20 minutes spent in counseling and coordination of care.    RICHARD Kim PA-C

## 2022-08-16 NOTE — PROGRESS NOTES
"Oncology Visit:   Date on this visit: Aug 16, 2022    Diagnosis:  ER positive left breast cancer metastasized to bones.     Primary Physician: No Ref-Primary, Physician     History Of Present Illness:    Ms. Sanderson is a 46 year old female with a h/o tobacco abuse and DVTs with left breast cancer metastasized to bone. She presented to [place] ED with back pain on 12/5/2020. MRI of the L-spine showed an abnormal L4 lesion with associated right paraspinal mass, abnormalities in L5 and the left iliac bone were also seen. CT C/A/P showed a left breast mass, lytic lesions of T7, L4, and the pelvis, and a 3 cm lesion in the kidney (thought to be a cyst). Ultrasound of the bilateral lower extremities showed a non-occlusive thrombus in the left popliteal vein. Mammogram and ultrasound of the bilateral breasts on 12/17/2020 showed a spiculated mass measuring at least 7.8 cm at 12-1:00 left breast extending from the nipple to 9 cm from the nipple with associated nipple retraction. This mass was biopsied, and showed IDC with surrounding DCIS, grade 3, ER+ 90%, and ND+ 75%.  HER2 was equivocal in approximately 35% of tumor cells by FISH and was negative by IHC.    Metastatic Breast Cancer Treatment:  12/23/2021 - 1/7/2021  Radiation (3000 cGy) to the lumbar spine.  1/29/2021 - present  Ibrance, zoladex, and anastrozole.  5/17/2021 radiofrequency ablation, kyphoplasty to L4  8/20/2021  Bilateral salpingo-oophorectomies, Ibrance and anastrozole.  She was off anastrozole 12/2021 - 2/2022 and off Ibrance 01/2022 - 02/2022 due to stress and impending homelessness. Off both again 03/2022-04/2022 due to death of her son but restarted in 05/2022.    Interval History:  Teresa has been stressed. Her son was \"jumped\" in [place] last night and suffered multiple head injuries. He is currently admitted with brain swelling. She is going to go see him later today. She was diagnosed with COVID on 8/1 after being exposed to her daughter. " Her only symptom was a headache for 1 day. She did hold ibrance for a week. She had a negative antigen test on 8/12.     She has continued to have a lot of lower back pain. She continues to take MS contin BID and oxycodone TID. She uses tylenol and ibuprofen as well. She is happy PET looks good. She wants to be more active but feels limited by pain. Admits taking oxycodone does relieve pain slightly but does not bring pain down a significant degree.     No fevers/chills, cough, SOB, nausea, bowel issues.       Past Medical/Surgical History:   Past Medical History:   Diagnosis Date     Anxiety      Breast CA (H) 12/2020     Depression      DVT (deep venous thrombosis) (H) 2014     Left breast mass     x approximately 4-5 months     Pulmonary emboli (H)      Pyelonephritis      Schizoaffective disorder (H)      Tobacco use      Past Surgical History:   Procedure Laterality Date     COLONOSCOPY N/A 7/8/2022    Procedure: COLONOSCOPY, WITH POLYPECTOMY;  Surgeon: Ham Cano MD;  [facility] [facility]     IR LUMBAR KYPHOPLASTY VERTEBRAE  5/17/2021     LAPAROSCOPIC SALPINGO-OOPHORECTOMY Bilateral 8/20/2021    Procedure: BILATERAL SALPINGO-OOPHORECTOMY, LAPAROSCOPIC;  Surgeon: Rory Lopez MD;  Location: [facility] OR     TUBAL LIGATION  1998        Allergies   Allergen Reactions     Contrast Dye      Pt developed nausea after isovue 370 injection on 6/9/21        Current Outpatient Medications   Medication     anastrozole (ARIMIDEX) 1 MG tablet     bisacodyl (DULCOLAX) 5 MG EC tablet     magnesium citrate solution     methocarbamol (ROBAXIN) 500 MG tablet     morphine (MS CONTIN) 15 MG CR tablet     nicotine (COMMIT) 2 MG lozenge     nicotine (NICORETTE) 2 MG gum     OLANZapine (ZYPREXA) 2.5 MG tablet     oxyCODONE (ROXICODONE) 5 MG tablet     palbociclib (IBRANCE) 125 MG tablet     pantoprazole (PROTONIX) 40 MG EC tablet     polyethylene glycol (GOLYTELY) 236 g suspension     polyethylene glycol (MIRALAX) 17  GM/Dose powder     rivaroxaban ANTICOAGULANT (XARELTO) 20 MG TABS tablet     SENNA-docusate sodium (SENNA S) 8.6-50 MG tablet     sennosides (SENOKOT) 8.6 MG tablet     sertraline (ZOLOFT) 100 MG tablet     sertraline (ZOLOFT) 50 MG tablet     tolnaftate (TINACTIN) 1 % external cream     Vitamin D3 (CHOLECALCIFEROL) 25 mcg (1000 units) tablet     diclofenac (VOLTAREN) 1 % topical gel     gabapentin (NEURONTIN) 300 MG capsule     ibuprofen (ADVIL/MOTRIN) 800 MG tablet     methylPREDNISolone (MEDROL) 32 MG tablet     naloxone (NARCAN) 4 MG/0.1ML nasal spray     No current facility-administered medications for this visit.     Facility-Administered Medications Ordered in Other Visits   Medication     zoledronic acid (ZOMETA) intermittent infusion 4 mg   '  Physical Exam:   BP (!) 143/102 (BP Location: Right arm, Patient Position: Sitting, Cuff Size: Adult Large)   Pulse 69   Temp 98.4  F (36.9  C) (Oral)   Resp 16   SpO2 100%    Wt Readings from Last 4 Encounters:   07/14/22 112.4 kg (247 lb 14.4 oz)   07/08/22 110.2 kg (243 lb)   06/14/22 110.5 kg (243 lb 9.6 oz)   05/13/22 110.9 kg (244 lb 6.4 oz)     General:  Pleasant adult female in NAD.  Alert and oriented.  HEENT:  Normocephalic.  Sclera anicteric. Enlarged thyroid on exam.  Lymph:  No palpable cervical, supraclavicular, or axillary LAD.  Chest:  CTA bilaterally.  No wheezes or crackles.  Breast: Deferred   CV:  RRR.  Nl S1 and S2.    Abd:  Soft/NT/ND. + BS   Ext:  No pitting edema of the bilateral lower extremities.    Musculo:  Strength 5/5 throughout.  Good movement of all 4 extremities.  Neuro:  Cranial nerves grossly intact.  Gait is stable.  Psych:  Mood and affect appear normal.    Laboratory/Imaging Studies:       08/16/22 12:03   Sodium 144   Potassium 3.3 (L)   Chloride 111 (H)   Carbon Dioxide 27   Urea Nitrogen 8   Creatinine 0.66   GFR Estimate >90   Calcium 9.0   Anion Gap 6   Albumin 3.4   Protein Total 7.4   Alkaline Phosphatase 108   ALT 28    AST 19   Bilirubin Total 0.2   Glucose 89   WBC 3.2 (L)   Hemoglobin 11.1 (L)   Hematocrit 33.0 (L)   Platelet Count 191   RBC Count 3.44 (L)   MCV 96   MCH 32.3   MCHC 33.6   RDW 13.8   % Neutrophils 47   % Lymphocytes 42   % Monocytes 6   % Eosinophils 2   % Basophils 2   Absolute Basophils 0.1   Absolute Eosinophils 0.1   Absolute Immature Granulocytes 0.0   Absolute Lymphocytes 1.3   Absolute Monocytes 0.2   % Immature Granulocytes 1   Absolute Neutrophils 1.5 (L)   Absolute NRBCs 0.0   NRBCs per 100 WBC 0   Tumor markers pending    PET/CT 8/12  IMPRESSION: In this patient with a history of metastatic breast  cancer, the findings suggest stable disease:  1. Unchanged hypermetabolic soft tissue nodule in the left breast  adjacent to the surgical clip. Findings remain suspicious for local  recurrent tumor. No new hypermetabolic disease in the body.  2. Stable non-FDG avid sclerotic lesions in the spine and pelvis.  3. Multinodular thyroid gland with new FDG uptake in the superior  portion of the left lobe. Consider dedicated thyroid ultrasound for  further evaluation.    ASSESSMENT/PLAN:   46 year old female with history of DVT and left breast invasive ductal carcinoma, ER/MD positive, HER2 negative metastasized to bones.    1.  Metastatic breast cancer: Her PET CT from 6/9/22 showed increased intensity in her left breast and rectosigmoid junction. She had been off treatment for 3 months surrounding that time. She had a colonoscopy revealing two polyps which were resected. Close follow-up PET was done last week showing stable L breast uptake which is stable otherwise MAGDI.  -Continue palbociclib and anastrozole  -Continue monthly follow-up and labs    -Next line treatment with abemaciclib and fulvestrant.    2.  Schizoaffective disorder, bipolar type: She is on treatment with Zoloft and Zyprexa.  Ongoing follow up with psychiatry.  She has increased anxiety around scans.  -Increased stress with family illness.  Provided support.     3.  Bone metastases/back pain:  She is s/p XRT to the lumbar spine and kyphoplasty of L4.  She is taking MS contin 15 mg PO BID and oxycodone and ibu profen prn.  She also takes methocarbamol prn muscle spasms. All were refilled today.   -Reviewed with her scans showing MAGDI in bones I would expect her pain from bone metastases to slowly improve with time. She did have a fall in June however there is no fracture or subacute trauma showing on CT. From an oncology perspective, we should be able to wean narcotics. I will refer her to PT and ortho to see if there is any DDD issue that may be contributing that could be optimized. Otherwise will start to wean narcotics next month. Discussed this with her and she is agreeable.     On Zometa since 1/18/2021 and is receiving once every 3 months. Next dose today.     4.  DVT:  She confirms that she has continued on Xarelto.  Recommend continuing Xarelto until contraindicated given metastatic disease.      5.  Enlarged thyroid: Thyroid tests WNL last month. PET showed a metabolic focus in L lobe. Will order thyroid US to further evaluate.     Greater than 40 minutes was spent with this patient with greater than 20 minutes spent in counseling and coordination of care.    Alee Yang PA-C

## 2022-08-16 NOTE — PROGRESS NOTES
Infusion Nursing Note:  Teresa Sanderson presents today for Zometa.    Patient seen by provider today: Yes: DIMITRIOS Yang   present during visit today: Not Applicable.    Note:     Intravenous Access:  Peripheral IV placed.    Treatment Conditions:  Lab Results   Component Value Date     08/16/2022    POTASSIUM 3.3 (L) 08/16/2022    CR 0.66 08/16/2022    NANDO 9.0 08/16/2022    BILITOTAL 0.2 08/16/2022    ALBUMIN 3.4 08/16/2022    ALT 28 08/16/2022    AST 19 08/16/2022     Results reviewed, labs MET treatment parameters, ok to proceed with treatment.    Post Infusion Assessment:  Patient tolerated infusion without incident.  No evidence of extravasations.  Access discontinued per protocol.     Discharge Plan:   Patient declined prescription refills.  Discharge instructions reviewed with: Patient.  AVS to patient via [employer].  Patient will return in 3 months for next appointment.   Departure Mode: Ambulatory.      Digna Pretty RN

## 2022-08-16 NOTE — NURSING NOTE
Oncology Rooming Note    August 16, 2022 12:09 PM   Teresa Sanderson is a 47 year old female who presents for:    No chief complaint on file.    Initial Vitals: BP (!) 143/102 (BP Location: Right arm, Patient Position: Sitting, Cuff Size: Adult Large)   Pulse 69   Temp 98.4  F (36.9  C) (Oral)   Resp 16   SpO2 100%  Estimated body mass index is 33.62 kg/m  as calculated from the following:    Height as of 7/8/22: 1.829 m (6').    Weight as of 7/14/22: 112.4 kg (247 lb 14.4 oz). There is no height or weight on file to calculate BSA.  Severe Pain (7) Comment: Data Unavailable   No LMP recorded. (Menstrual status: Tubal ).  Allergies reviewed: Yes  Medications reviewed: Yes    Medications: MEDICATION REFILLS NEEDED TODAY. Provider was notified.  Pharmacy name entered into EPIC:    [facility] - [place], [place] - [address] [place] [address] [facility]  [id number] - [place], [place] - [address] [facility] - [place], [place] - [address]  [facility] - [place], [place] - [facility] [address]   [facility] - [place], [place] - [address]    Clinical concerns: Med refills on back of wristband.       Ray Santacruz RN

## 2022-08-17 DIAGNOSIS — Z17.0 MALIGNANT NEOPLASM OF OVERLAPPING SITES OF LEFT BREAST IN FEMALE, ESTROGEN RECEPTOR POSITIVE (H): ICD-10-CM

## 2022-08-17 DIAGNOSIS — C79.51 CARCINOMA OF LEFT BREAST METASTATIC TO BONE (H): ICD-10-CM

## 2022-08-17 DIAGNOSIS — C50.912 CARCINOMA OF LEFT BREAST METASTATIC TO BONE (H): ICD-10-CM

## 2022-08-17 DIAGNOSIS — C50.812 MALIGNANT NEOPLASM OF OVERLAPPING SITES OF LEFT BREAST IN FEMALE, ESTROGEN RECEPTOR POSITIVE (H): ICD-10-CM

## 2022-08-17 LAB — MAYO MISC RESULT: ABNORMAL

## 2022-08-17 RX ORDER — PALBOCICLIB 125 MG/1
TABLET, FILM COATED ORAL
Qty: 21 TABLET | Refills: 2 | OUTPATIENT
Start: 2022-08-17

## 2022-08-22 DIAGNOSIS — C50.812 MALIGNANT NEOPLASM OF OVERLAPPING SITES OF LEFT BREAST IN FEMALE, ESTROGEN RECEPTOR POSITIVE (H): ICD-10-CM

## 2022-08-22 DIAGNOSIS — C50.912 CARCINOMA OF LEFT BREAST METASTATIC TO BONE (H): Primary | ICD-10-CM

## 2022-08-22 DIAGNOSIS — Z17.0 MALIGNANT NEOPLASM OF OVERLAPPING SITES OF LEFT BREAST IN FEMALE, ESTROGEN RECEPTOR POSITIVE (H): ICD-10-CM

## 2022-08-22 DIAGNOSIS — C79.51 CARCINOMA OF LEFT BREAST METASTATIC TO BONE (H): Primary | ICD-10-CM

## 2022-08-22 RX ORDER — ANASTROZOLE 1 MG/1
1 TABLET ORAL DAILY
Qty: 28 TABLET | Refills: 2 | Status: SHIPPED | OUTPATIENT
Start: 2022-08-22 | End: 2022-11-11

## 2022-08-24 ENCOUNTER — TELEPHONE (OUTPATIENT)
Dept: ONCOLOGY | Facility: CLINIC | Age: 47
End: 2022-08-24

## 2022-08-24 NOTE — TELEPHONE ENCOUNTER
Oral Chemotherapy Monitoring Program    Placed call to patient in follow up of palbociclib therapy.    Left message to please call back in follow up of therapy. No patient or drug names were mentioned.    Megan Green  Pharmacy Intern  Oral Chemotherapy Monitoring Program   [facility]  [phone]

## 2022-08-31 ENCOUNTER — TELEPHONE (OUTPATIENT)
Dept: ONCOLOGY | Facility: CLINIC | Age: 47
End: 2022-08-31

## 2022-08-31 NOTE — ORAL ONC MGMT
Oral Chemotherapy Monitoring Program     Placed call to patient in follow up of palbociclib therapy.     Left message to please call back in follow up of therapy. No patient or drug names were mentioned.    Jerad Jackson, PharmD  Hematology/Oncology Clinical Pharmacist  [facility]  [facility]

## 2022-09-06 ENCOUNTER — TELEPHONE (OUTPATIENT)
Dept: PSYCHIATRY | Facility: CLINIC | Age: 47
End: 2022-09-06

## 2022-09-06 DIAGNOSIS — F25.0 SCHIZOAFFECTIVE DISORDER, BIPOLAR TYPE (H): Primary | ICD-10-CM

## 2022-09-06 NOTE — TELEPHONE ENCOUNTER
Pt did not keep today's appointment with Dr. Tyson. Writer placed outgoing phone call to pt to address the following. Pt did not answer. RN left  requesting a c/b. Will make second attempt to reach pt tomorrow if no response.     Per Dr. Tyson:  Will ask my team to call pt and check:  -did she increase the Zoloft to 150mg?    If so, any sxs of Serotonin Syndrome?   -did she move all Zyprexa to 10mg hs?   If so, did that help with sleep?  -the reason she uses Zyprexa 2.5 mg tabs instead of 5mg tabs  -try to reschedule with me 1230 on Sept 28th  -if these changes were not made, please make them and if not helpful will change meds at next appt.

## 2022-09-07 RX ORDER — QUETIAPINE FUMARATE 100 MG/1
100 TABLET, FILM COATED ORAL AT BEDTIME
Qty: 30 TABLET | Refills: 0 | Status: SHIPPED | OUTPATIENT
Start: 2022-09-07 | End: 2022-10-03

## 2022-09-07 NOTE — TELEPHONE ENCOUNTER
"Spoke with pt - she is out of town d/t a family emergency. Pt reports her oldest son got jumped, \"hate crime,\" in [place]. Pt is currently in [place] to be with her son. Her biggest concern is insomnia. She recently ran out of olanzapine and has taken Seroquel 100mg nightly for the past 4-5 nights from a left over supply. Pt would like to continue Seroquel as she reports it has been more helpful for sleep than olanzapine had been. Insomnia has been the pt's biggest concern - has been having trouble falling and staying asleep.     Her mood is \"up and down.\" Reports feeling more angry and agitated which she attributes in part to the recent situation with her son. She denies AH/VH. She has increased Zoloft to 150mg daily - denies noticeable benefit at this time. Denies s/s of serotonin syndrome - including tremor, fever, nausea/diarrhea, or confusion. Denies suicidal thoughts.     [facility] [place].   "

## 2022-09-07 NOTE — TELEPHONE ENCOUNTER
Reviewed case with Dr. Tyson. Obtained consent to discontinue olanzapine and order Seroquel 100mg at bedtime. Orders entered.     Spoke with pt and provided update. Pt scheduled with Dr. Tyson on 9/28 from noon-1p.

## 2022-09-08 ENCOUNTER — TELEPHONE (OUTPATIENT)
Dept: ONCOLOGY | Facility: CLINIC | Age: 47
End: 2022-09-08

## 2022-09-08 NOTE — ORAL ONC MGMT
Subjective/Objective:  Teresa Sanderson is a 47 year old female contacted by phone for a follow-up visit for oral chemotherapy.  Pt confirms they are taking Ibrance as prescribe. Pt reports no missed doses. Pt reports she is in her 2nd week of current box. Pt reports tolerating the medication well. Pt denies side effects with the exception of fatigue. She is out of town due to a personal situation, but reports will be returning ~9/20/22.    ORAL CHEMOTHERAPY 5/23/2022 6/4/2022 8/3/2022 8/22/2022 8/24/2022 8/31/2022 9/8/2022   Assessment Type Refill Monthly Follow up Monthly Follow up Refill Left Voicemail Left Voicemail Monthly Follow up   Diagnosis Code Breast Cancer Breast Cancer Breast Cancer Breast Cancer Breast Cancer Breast Cancer Breast Cancer   Providers Dr. Dimitrios Plunkett   [facility]   Drug Name Ibrance (palbociclib) Ibrance (palbociclib) Ibrance (palbociclib) Ibrance (palbociclib) Ibrance (palbociclib) Ibrance (palbociclib) Ibrance (palbociclib)   Dose 125 mg 125 mg - 125 mg - - 125 mg   Current Schedule - Daily - Daily - - Daily   Cycle Details 3 weeks on, 1 week off 3 weeks on, 1 week off - 3 weeks on, 1 week off - - 3 weeks on, 1 week off   Start Date of Last Cycle - 5/18/2022 - - - - -   Planned next cycle start date - 6/18/2022 - - - - -   Doses missed in last 2 weeks - 3 - - - - 0   Adherence Assessment - Non-adherent - - - - Adherent   Reason for Non-adherence - Other - - - - -   Adherence Intervention Recommended - None - - - - -   Adverse Effects - No AE identified during assessment - - - - Fatigue   Fatigue - - - - - - Grade 1   Pharmacist Intervention(fatigue) - - - - - - Yes   Intervention(s) - - - - - - Patient education   Any new drug interactions? - - - - - - No   Pharmacist Intervention? - - - - - - -   Intervention(s) - - - - - - -   Is the dose as  ordered appropriate for the patient? - - - - - - -   Since the last time we talked, have you been hospitalized or used the emergency room? - - - - - - -       Vitals:  BP:   BP Readings from Last 1 Encounters:   08/16/22 (!) 143/102     Wt Readings from Last 1 Encounters:   07/14/22 112.4 kg (247 lb 14.4 oz)     Estimated body surface area is 2.39 meters squared as calculated from the following:    Height as of 7/8/22: 1.829 m (6').    Weight as of 7/14/22: 112.4 kg (247 lb 14.4 oz).    Labs:  _  Result Component Current Result Ref Range   Sodium 144 (8/16/2022) 133 - 144 mmol/L     Result Component Current Result Ref Range   Potassium 3.3 (L) (8/16/2022) 3.4 - 5.3 mmol/L     Result Component Current Result Ref Range   Calcium 9.0 (8/16/2022) 8.5 - 10.1 mg/dL     Result Component Current Result Ref Range   Albumin 3.4 (8/16/2022) 3.4 - 5.0 g/dL     Result Component Current Result Ref Range   Urea Nitrogen 8 (8/16/2022) 7 - 30 mg/dL     Result Component Current Result Ref Range   Creatinine 0.66 (8/16/2022) 0.52 - 1.04 mg/dL     Result Component Current Result Ref Range   AST 19 (8/16/2022) 0 - 45 U/L     Result Component Current Result Ref Range   ALT 28 (8/16/2022) 0 - 50 U/L     Result Component Current Result Ref Range   Bilirubin Total 0.2 (8/16/2022) 0.2 - 1.3 mg/dL     Result Component Current Result Ref Range   WBC Count 3.2 (L) (8/16/2022) 4.0 - 11.0 10e3/uL     Result Component Current Result Ref Range   Hemoglobin 11.1 (L) (8/16/2022) 11.7 - 15.7 g/dL     Result Component Current Result Ref Range   Platelet Count 191 (8/16/2022) 150 - 450 10e3/uL       Assessment/Plan:  Pt is currently tolerating therapy well. Plan to continue Ibrance as prescribed. Pt verbalized understanding and agrees to plan. Encouraged pt to call with any issues or concerns.    Follow-Up:  No appts scheduled. IB sent to care team to assess and last note indicates monthly follow-up.        Pearl Garcia, PharmD  Oral Chemotherapy  Monitoring Program  [facility]  [phone]  September 8, 2022

## 2022-09-09 ENCOUNTER — TELEPHONE (OUTPATIENT)
Dept: GASTROENTEROLOGY | Facility: CLINIC | Age: 47
End: 2022-09-09

## 2022-09-09 NOTE — TELEPHONE ENCOUNTER
Pre assessment phone call placed to patient to go over colonoscopy prep instructions.    Patient states is out of town and will need to reschedule.     Transferred to scheduling per patient request.

## 2022-09-15 NOTE — TELEPHONE ENCOUNTER
Patient is still on the schedule for a colonoscopy on 9.16.2022.  RN reached out to pt to confirm if pt is keeping appointment or needing to r/s.      Pt states to cancel this appointment as she is out of town.  She will call next week to reschedule.    Pt has no further questions or concerns.      Monae Potts RN

## 2022-09-24 ENCOUNTER — HEALTH MAINTENANCE LETTER (OUTPATIENT)
Age: 47
End: 2022-09-24

## 2022-10-02 ENCOUNTER — DOCUMENTATION ONLY (OUTPATIENT)
Dept: PSYCHIATRY | Facility: CLINIC | Age: 47
End: 2022-10-02
Payer: COMMERCIAL

## 2022-10-02 NOTE — PROGRESS NOTES
PSYCHIATRY CLINIC    [facility] did not connect for her last appt on 09/28.  Our RNCC team will do an outreach call next week and a follow-up visit will be offered. Will determine need for refills during that call.  MD Derrell    Addendum 10/03/2022  This pt will transfer care to Dr Castaneda in this clinic.  The first appt is tomorrow 10/04.  Zyprexa is switched to Seroquel, will provide refills today.  Tomorrow will discuss increasing Zoloft from 100mg to 150mg which was not done.  MD Derrell

## 2022-10-03 ENCOUNTER — TELEPHONE (OUTPATIENT)
Dept: PSYCHIATRY | Facility: CLINIC | Age: 47
End: 2022-10-03

## 2022-10-03 DIAGNOSIS — F25.0 SCHIZOAFFECTIVE DISORDER, BIPOLAR TYPE (H): ICD-10-CM

## 2022-10-03 RX ORDER — QUETIAPINE FUMARATE 50 MG/1
TABLET, FILM COATED ORAL
Qty: 56 TABLET | Refills: 0 | Status: SHIPPED | OUTPATIENT
Start: 2022-10-03 | End: 2022-10-04

## 2022-10-03 NOTE — TELEPHONE ENCOUNTER
"RN spoke with pt:  -Pt back in [place]. Living with her cousin and her son. Is currently sharing a bedroom and bed with son. Top priority is to find a place to live because the living situation with cousin is unstable and there are a lot of people in the house. She has $1400/month for rent, but doesn't know how to initiate the process for locating a new home.   -Feeling overwhelmed, hopeless, and has been having difficulty getting out of bed. Denies plan or intent to take her life.   -Her income is SSDI. She has no  or . She did get 4 hours of PCA service/day, but this was her daughter and her daughter is no longer doing this. She denies receiving any other benefits/services.   -She did not  Seroquel while in [place]. She has been out of olanzapine or Seroquel for weeks. She has been taking sertraline 100mg/daioy - she never increased to 150mg daily which is what Dr. Hernandez's note from 22 instructed her to do.   -Getting about 5 hours of sleep.   -AH: Reports one instance of hearing her  son's voice telling her to \"come here.\" She denies command hallucinations. No VH.    She was instructed to call 911 or come into the ED should any acute safety concerns arise. Her son is her biggest source of support at this time.   "
Orders per Dr. Tyson:  -needs Seroquel 50mg tabs- take 1 at night for 4 nights and if not helping with sleep but no adverse effects, increase to 100mg   -goal is Zoloft 150mg - if taking 100mg she should increase to 150mg     Obtained consent from Dr. Tyson to issue the following rx:  Seroquel 50mg tabs - take 1 tab daily x4 days, then increase to two tabs nightly if needed for sleep (#56 no refills)    Spoke with pharmacy. They will fill sertraline 100mg and 50mg tablets AND Seroquel 50mg tablets.     Spoke with pt and reviewed medication instructions as per above.  
Statement Selected

## 2022-10-04 ENCOUNTER — VIRTUAL VISIT (OUTPATIENT)
Dept: PSYCHIATRY | Facility: CLINIC | Age: 47
End: 2022-10-04
Attending: PSYCHIATRY & NEUROLOGY
Payer: COMMERCIAL

## 2022-10-04 DIAGNOSIS — Z63.4 BEREAVEMENT: ICD-10-CM

## 2022-10-04 DIAGNOSIS — F25.0 SCHIZOAFFECTIVE DISORDER, BIPOLAR TYPE (H): Primary | ICD-10-CM

## 2022-10-04 DIAGNOSIS — Z79.899 ENCOUNTER FOR LONG-TERM (CURRENT) USE OF MEDICATIONS: ICD-10-CM

## 2022-10-04 PROCEDURE — 99207 PR SERVICE NOT STAFFED W/SUPERV PROV: CPT | Mod: 95 | Performed by: STUDENT IN AN ORGANIZED HEALTH CARE EDUCATION/TRAINING PROGRAM

## 2022-10-04 RX ORDER — SERTRALINE HYDROCHLORIDE 100 MG/1
TABLET, FILM COATED ORAL
Qty: 30 TABLET | Refills: 1 | Status: SHIPPED | OUTPATIENT
Start: 2022-10-04 | End: 2022-10-17

## 2022-10-04 RX ORDER — QUETIAPINE FUMARATE 50 MG/1
TABLET, FILM COATED ORAL
Qty: 60 TABLET | Refills: 1 | Status: SHIPPED | OUTPATIENT
Start: 2022-10-04 | End: 2022-10-17

## 2022-10-04 SDOH — SOCIAL STABILITY - SOCIAL INSECURITY: DISSAPEARANCE AND DEATH OF FAMILY MEMBER: Z63.4

## 2022-10-04 ASSESSMENT — PATIENT HEALTH QUESTIONNAIRE - PHQ9: SUM OF ALL RESPONSES TO PHQ QUESTIONS 1-9: 20
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
## 2022-10-04 NOTE — PATIENT INSTRUCTIONS
**For crisis resources, please see the information at the end of this document**   Patient Education    Thank you for coming to the [facility] [place] [facility].     Lab Testing:  If you had lab testing today and your results are reassuring or normal they will be mailed to you or sent through [employer] within 7 days. If the lab tests need quick action we will call you with the results. The phone number we will call with results is # [phone]. If this is not the best number please call our clinic and change the number.     Medication Refills:  If you need any refills please call your pharmacy and they will contact us. Our fax number for refills is [phone].   Three business days of notice are needed for general medication refill requests.   Five business days of notice are needed for controlled substance refill requests.   If you need to change to a different pharmacy, please contact the new pharmacy directly. The new pharmacy will help you get your medications transferred.     Contact Us:  Please call [phone] during business hours (8-5:00 M-F).   If you have medication related questions after clinic hours, or on the weekend, please call [phone].     Financial Assistance [phone]   Medical Records [phone]       MENTAL HEALTH CRISIS RESOURCES:  For a emergency help, please call 911 or go to the nearest Emergency Department.     Emergency Walk-In Options:   EmPATH Unit @ [facility] ([place]): [phone] - Specialized mental health emergency area designed to be calming  [facility] ([place]): [phone]  [facility] ([place]): [phone]  [facility]): [phone]    [facility] Crisis Information:   [place]: [phone]  [place]: [phone]  [place] ([place]) - Adult: [phone]     Child: [phone]  [place] - Adult: [phone]     Child: [phone]  [place]:  [phone]  List of all [facility] resources:   https://[place].gov/dhs/people-we-serve/adults/health-care/mental-health/resources/crisis-contacts.jsp    National Crisis Information:   Crisis Text Line: Text  [place]  to 145332  Suicide & Crisis Lifeline: 988  National Suicide Prevention Lifeline: 2-819-204-TALK (1-563.737.2936)       For online chat options, visit https://suicidepreventionlifeline.org/chat/  Poison Control Center: 5-172-978-4818  Trans Lifeline: 9-162-035-6027 - Hotline for transgender people of all ages  [employer]: 1-629-897-0216 - Hotline for LGBT youth     For Non-Emergency Support:   Fast Tracker: Mental Health & Substance Use Disorder Resources -   https://www.[employer].org/

## 2022-10-04 NOTE — PROGRESS NOTES
Teresa Sanderson is a 47 year old who has consented to receive services via billable video visit.      Pt will join video visit via: [employer]  If there are problems joining the visit, send backup video invite via: Text to preferred phone: [phone]      Originating Location (patient location): Patient's home  Distant Location (provider location): [facility] [place] [facility]    Will anyone else be joining the video visit? No

## 2022-10-05 ENCOUNTER — TELEPHONE (OUTPATIENT)
Dept: PSYCHIATRY | Facility: CLINIC | Age: 47
End: 2022-10-05
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
## 2022-10-15 ENCOUNTER — APPOINTMENT (OUTPATIENT)
Dept: GENERAL RADIOLOGY | Facility: CLINIC | Age: 47
End: 2022-10-15
Attending: STUDENT IN AN ORGANIZED HEALTH CARE EDUCATION/TRAINING PROGRAM
Payer: COMMERCIAL

## 2022-10-15 ENCOUNTER — HOSPITAL ENCOUNTER (EMERGENCY)
Facility: CLINIC | Age: 47
Discharge: HOME | End: 2022-10-16
Attending: STUDENT IN AN ORGANIZED HEALTH CARE EDUCATION/TRAINING PROGRAM | Admitting: STUDENT IN AN ORGANIZED HEALTH CARE EDUCATION/TRAINING PROGRAM
Payer: COMMERCIAL

## 2022-10-15 DIAGNOSIS — Z11.52 ENCOUNTER FOR SCREENING LABORATORY TESTING FOR SEVERE ACUTE RESPIRATORY SYNDROME CORONAVIRUS 2 (SARS-COV-2): ICD-10-CM

## 2022-10-15 DIAGNOSIS — J06.9 UPPER RESPIRATORY INFECTION: ICD-10-CM

## 2022-10-15 LAB
ALBUMIN SERPL BCG-MCNC: 4.3 G/DL (ref 3.5–5.2)
ALP SERPL-CCNC: 108 U/L (ref 35–104)
ALT SERPL W P-5'-P-CCNC: 16 U/L (ref 10–35)
ANION GAP SERPL CALCULATED.3IONS-SCNC: 11 MMOL/L (ref 7–15)
AST SERPL W P-5'-P-CCNC: 18 U/L (ref 10–35)
BASOPHILS # BLD AUTO: 0.1 10E3/UL (ref 0–0.2)
BASOPHILS NFR BLD AUTO: 2 %
BILIRUB SERPL-MCNC: 0.2 MG/DL
BUN SERPL-MCNC: 13.2 MG/DL (ref 6–20)
CALCIUM SERPL-MCNC: 9.8 MG/DL (ref 8.6–10)
CHLORIDE SERPL-SCNC: 105 MMOL/L (ref 98–107)
CREAT SERPL-MCNC: 0.87 MG/DL (ref 0.51–0.95)
DEPRECATED HCO3 PLAS-SCNC: 25 MMOL/L (ref 22–29)
EOSINOPHIL # BLD AUTO: 0.1 10E3/UL (ref 0–0.7)
EOSINOPHIL NFR BLD AUTO: 3 %
ERYTHROCYTE [DISTWIDTH] IN BLOOD BY AUTOMATED COUNT: 13.5 % (ref 10–15)
FLUAV RNA SPEC QL NAA+PROBE: NEGATIVE
FLUBV RNA RESP QL NAA+PROBE: NEGATIVE
GFR SERPL CREATININE-BSD FRML MDRD: 82 ML/MIN/1.73M2
GLUCOSE SERPL-MCNC: 127 MG/DL (ref 70–99)
HCT VFR BLD AUTO: 36.1 % (ref 35–47)
HGB BLD-MCNC: 12.1 G/DL (ref 11.7–15.7)
IMM GRANULOCYTES # BLD: 0 10E3/UL
IMM GRANULOCYTES NFR BLD: 1 %
LYMPHOCYTES # BLD AUTO: 1.3 10E3/UL (ref 0.8–5.3)
LYMPHOCYTES NFR BLD AUTO: 33 %
MCH RBC QN AUTO: 33.1 PG (ref 26.5–33)
MCHC RBC AUTO-ENTMCNC: 33.5 G/DL (ref 31.5–36.5)
MCV RBC AUTO: 99 FL (ref 78–100)
MONOCYTES # BLD AUTO: 0.2 10E3/UL (ref 0–1.3)
MONOCYTES NFR BLD AUTO: 5 %
NEUTROPHILS # BLD AUTO: 2.3 10E3/UL (ref 1.6–8.3)
NEUTROPHILS NFR BLD AUTO: 56 %
NRBC # BLD AUTO: 0 10E3/UL
NRBC BLD AUTO-RTO: 0 /100
PLATELET # BLD AUTO: 210 10E3/UL (ref 150–450)
POTASSIUM SERPL-SCNC: 3.7 MMOL/L (ref 3.4–5.3)
PROT SERPL-MCNC: 7.8 G/DL (ref 6.4–8.3)
RBC # BLD AUTO: 3.66 10E6/UL (ref 3.8–5.2)
RSV RNA SPEC NAA+PROBE: NEGATIVE
SARS-COV-2 RNA RESP QL NAA+PROBE: NEGATIVE
SODIUM SERPL-SCNC: 141 MMOL/L (ref 136–145)
TROPONIN T SERPL HS-MCNC: 6 NG/L
WBC # BLD AUTO: 3.9 10E3/UL (ref 4–11)

## 2022-10-15 PROCEDURE — 93005 ELECTROCARDIOGRAM TRACING: CPT

## 2022-10-15 PROCEDURE — 87637 SARSCOV2&INF A&B&RSV AMP PRB: CPT | Performed by: STUDENT IN AN ORGANIZED HEALTH CARE EDUCATION/TRAINING PROGRAM

## 2022-10-15 PROCEDURE — 99285 EMERGENCY DEPT VISIT HI MDM: CPT | Mod: CS | Performed by: STUDENT IN AN ORGANIZED HEALTH CARE EDUCATION/TRAINING PROGRAM

## 2022-10-15 PROCEDURE — C9803 HOPD COVID-19 SPEC COLLECT: HCPCS

## 2022-10-15 PROCEDURE — 80053 COMPREHEN METABOLIC PANEL: CPT | Performed by: STUDENT IN AN ORGANIZED HEALTH CARE EDUCATION/TRAINING PROGRAM

## 2022-10-15 PROCEDURE — 250N000013 HC RX MED GY IP 250 OP 250 PS 637: Performed by: STUDENT IN AN ORGANIZED HEALTH CARE EDUCATION/TRAINING PROGRAM

## 2022-10-15 PROCEDURE — 84484 ASSAY OF TROPONIN QUANT: CPT | Performed by: STUDENT IN AN ORGANIZED HEALTH CARE EDUCATION/TRAINING PROGRAM

## 2022-10-15 PROCEDURE — 250N000011 HC RX IP 250 OP 636: Performed by: STUDENT IN AN ORGANIZED HEALTH CARE EDUCATION/TRAINING PROGRAM

## 2022-10-15 PROCEDURE — 93010 ELECTROCARDIOGRAM REPORT: CPT | Performed by: STUDENT IN AN ORGANIZED HEALTH CARE EDUCATION/TRAINING PROGRAM

## 2022-10-15 PROCEDURE — 71046 X-RAY EXAM CHEST 2 VIEWS: CPT | Mod: 26 | Performed by: RADIOLOGY

## 2022-10-15 PROCEDURE — 71046 X-RAY EXAM CHEST 2 VIEWS: CPT

## 2022-10-15 PROCEDURE — 85004 AUTOMATED DIFF WBC COUNT: CPT | Performed by: STUDENT IN AN ORGANIZED HEALTH CARE EDUCATION/TRAINING PROGRAM

## 2022-10-15 PROCEDURE — 99285 EMERGENCY DEPT VISIT HI MDM: CPT | Mod: CS,25

## 2022-10-15 RX ORDER — ACETAMINOPHEN 325 MG/1
650 TABLET ORAL EVERY 4 HOURS PRN
Status: DISCONTINUED | OUTPATIENT
Start: 2022-10-15 | End: 2022-10-16 | Stop reason: HOSPADM

## 2022-10-15 RX ORDER — ONDANSETRON 4 MG/1
4 TABLET, ORALLY DISINTEGRATING ORAL ONCE
Status: COMPLETED | OUTPATIENT
Start: 2022-10-15 | End: 2022-10-15

## 2022-10-15 RX ADMIN — ONDANSETRON 4 MG: 4 TABLET, ORALLY DISINTEGRATING ORAL at 23:44

## 2022-10-15 RX ADMIN — ACETAMINOPHEN 650 MG: 325 TABLET, FILM COATED ORAL at 23:45

## 2022-10-15 ASSESSMENT — ENCOUNTER SYMPTOMS
BACK PAIN: 1
CHEST TIGHTNESS: 1
FATIGUE: 1
COUGH: 1

## 2022-10-15 ASSESSMENT — ACTIVITIES OF DAILY LIVING (ADL)
ADLS_ACUITY_SCORE: 37
ADLS_ACUITY_SCORE: 37

## 2022-10-16 VITALS
HEART RATE: 70 BPM | OXYGEN SATURATION: 100 % | TEMPERATURE: 98.2 F | DIASTOLIC BLOOD PRESSURE: 99 MMHG | WEIGHT: 264 LBS | BODY MASS INDEX: 35.76 KG/M2 | RESPIRATION RATE: 16 BRPM | SYSTOLIC BLOOD PRESSURE: 130 MMHG | HEIGHT: 72 IN

## 2022-10-16 LAB
ATRIAL RATE - MUSE: 57 BPM
DIASTOLIC BLOOD PRESSURE - MUSE: NORMAL MMHG
INTERPRETATION ECG - MUSE: NORMAL
P AXIS - MUSE: 56 DEGREES
PR INTERVAL - MUSE: 166 MS
QRS DURATION - MUSE: 92 MS
QT - MUSE: 448 MS
QTC - MUSE: 436 MS
R AXIS - MUSE: 24 DEGREES
SYSTOLIC BLOOD PRESSURE - MUSE: NORMAL MMHG
T AXIS - MUSE: 38 DEGREES
VENTRICULAR RATE- MUSE: 57 BPM

## 2022-10-16 NOTE — ED TRIAGE NOTES
Patient c/o fatigue, cough, chest tightness, and diffuse back pain o/s x3 days ago. PMH: Dagmar JONES with mets        Triage Assessment     Row Name 10/15/22 2013       Triage Assessment (Adult)    Airway WDL WDL       Respiratory WDL    Respiratory WDL X       Skin Circulation/Temperature WDL    Skin Circulation/Temperature WDL WDL       Cardiac WDL    Cardiac WDL X       Peripheral/Neurovascular WDL    Peripheral Neurovascular WDL WDL       Cognitive/Neuro/Behavioral WDL    Cognitive/Neuro/Behavioral WDL WDL

## 2022-10-16 NOTE — PROGRESS NOTES
VIDEO VISIT  Teresa Sanderson is a 47 year old patient who is being evaluated via a billable video visit.      How would you like to obtain your AVS?: MyChart  AVS SmartPhrase [PsychAVS] has been placed in 'Patient Instructions': Yes      Video- Visit Details  Type of service:  video visit for medication management  Time of service:    Start Time:  10:48 AM    End Time:  11:12 AM    Originating Site (patient location):  State- [place]   Location- Friend or family home  Distant Site (provider location):  Remote location  Mode of Communication:  Video Conference via [facility]  Psychiatry Clinic  MEDICAL PROGRESS NOTE     CARE TEAM:  PCP- Physician No Ref-Primary    Psychotherapist- None   Teresa is a 47 year old who uses the name Teresa and pronouns she, her, hers.      DIAGNOSIS   Schizoaffective disorder, bipolar type (previously diagnosed with bipolar disorder)  Bereavement  Metastatic breast cancer     ASSESSMENT   Teresa states she is doing alright overall. She currently has a URI and went to the ED on Saturday and she was not happy with the care. She has an oncology appointment later this afternoon and she is hoping to receive something for her cough as well as get her pain medications refilled. She has been out of her prescribed pain medication and has been using OTC pain medications to try and manage her pain from the metastatic breast cancer.     She continues to have difficulty falling and staying asleep. We will increase her Seroquel to target this. She has been at her current Zoloft dose for about two weeks now and states she has not noticed positive benefits yet. We will give this medication more time to take effect before increasing. She was given the phone number to schedule a therapy intake appointment, as I think she will significantly benefit from therapy support services. She was also reminded to get her lipid and hgb A1C labs done.      She will continue  "to work with Tali () for housing assistance first and PCA services in the future. Future consideration includes support services for smoking cessation.     [place] was [place] today:  Indicates taking controlled medication as prescribed.     PLAN                                                                                                                1) Meds-  Increase: Seroquel 150 mg nightly  Continue: Zoloft 150 mg daily     Prescribed by Heme Onc:  oxyCODONE (ROXICODONE) 5 MG tablet - 5-10 mg every 6 hours as needed for moderate to severe pain  morphine (MS CONTIN) 15 MG CR tablet - 15 mg by mouth every 12 hours  Gabapentin 600 mg in the morning, 600 mg at 2 pm and 600 mg at bedtime  Robaxin 500 mg TID PRN for muscle spasms     2) Psychotherapy- Referral placed; Teresa given phone number and states she will call to schedule      3) Next due-  Labs- SGA labs: lipid and Hgb A1C previously ordered   EKG- As indicated   Rating scales- PHQ9 and GAD7    4) Referrals-  none    5) Dispo- RTC in 4 weeks      PERTINENT BACKGROUND   Copied forward from 7/6/22 note by Dr. Hernandez                                                 [most recent eval 10/04/22]   Medical: Diagnosed with L breast cancer in Dec 2020 after presenting with a few mos of  back pain. Mets to L4, L5, left iliac bone as well as paraspinal mass. Jan 2021 started   Ibrance, zoladex, and anastrozole; 5/17/21- s/p radiofrequency ablation, keloplasty/  segmental plasty of L4; July 2021- showing complete response to treatment.  Psych: Lifetime history of \"rapid mood shifts\". S/P 7-8 hospitalizations, all in [place] except   the 1st at [facility] in 1998. Seroquel started at that time, took 100mg TID x yrs. Details  in eval. Hospitalization [id number] is discussed in eval and documents what sounds like   a full manic episode. CA dx'd 12/2020. Zoloft started after the cancer dx. Seroquel 100mg   was stopped August 2021 (not helping), doxepin and Ambien " tried for sleep-neither helped. Son  2022 by accidental overdose.   Meds: Seroquel x years 100mg-300mg was helpful but 300mg too sedating & became  less effective over time; Invega for a short period; Zyprexa was helpful; no Haldol or Risperdal;   Lithium did not help (? 2 yrs ago) same for Depakote; trazodone; no HOWELL    Pertinent Items Include: suicidal ideation, psychosis, tom, mutiple   psychotropic trials, trauma hx, violent behavior, psych hosps, cocaine 2019. Last hosp  2021.     SUBJECTIVE     - Medical update: Oncology visit , PET 2022 stable L breast uptake  - Cancer treatment: anastrozole (Arimidex) po, palbociclib (Ibrance) po and zoledronic acid (Zometa) INF q 3 months     Today:   - Mood: states she is alright, has a cold and not feeling well, went to ER a couple days ago, cough/cold and URI, back and chest hurting   - everything is about the same, still grieving, a lot of crying, feeling tired   - not happy with ER care, they forgot about her in the lobby, felt pushed out the door, received tylenol but nothing for cough      - hoping to get refill for pain meds at oncology appointment later today, has been taking tylenol and ibuprofen, has been in a lot of pain  - oncology apt 4:15 later today, hoping they can give her some help with this cold      - talked with Tali (), gave a website for housing and no luck so far, last week her and her son looked for apartments and talked with people, learned a lot of places go by credit score, one apartmentt said they have a 2 bedroom available and they will call her this week to fill out applications   - current housing situation is going okay, sharing room with son     Medications:  - Taking 100 mg of seroquel  - has not noticed any positive effects yet from medications, taking everyday     - Therapy: Called and letter sent. Call [phone] to schedule therapy     Recent Psych Symptoms:   Anxiety comes and goes, only  when thinking about certain things, not feeling up to much right now   Trauma Related:  avoidance, trauma trigger psychological / physiological response and trauma memory loss  Sleep: still having a hard time falling asleep and staying asleep, sometimes wakes up with sweats in middle of night, no nightmares      Recent Substance Use, per last visit on 10/04:     -alcohol: Occasional wine   -cannabis: Once or twice a month   -tobacco: Yes, 4-5 cigarettes a day; trying to quit  -opioids: Yes: for pain   Narcan Kit currrent: Yes   -other: none     Pertinent Negatives: No suicidal or violent ideation, self-injury, psychosis, tom, hypomania, aggression and harmful substance use  Adverse Effects: Denies     FAMILY and SOCIAL HISTORY        Copied forward from 22 note by Dr. Hernandez       Family Hx:  Multiple relatives with schizophrenia  Social Hx:  Lives with cousin and son, supported by social security disability, has 5 adult   children and 6 grandchildren.   Older history-mom  when pt was 7yo, chaotic childhood and   h/o trauma. [place] lived between [place] over the years.    PSYCH and SUBSTANCE USE Critical Summary Points since 2022   10/4/22 - transfer visit  10/17/22 - Increased Seroquel to 150 mg at bedtime      MEDICAL HISTORY and ALLERGY     ALLERGIES: Contrast dye    Patient Active Problem List   Diagnosis     Breast mass     Spine metastasis (H)     Urinary tract infection with hematuria     Malignant neoplasm of overlapping sites of left breast in female, estrogen receptor positive (H)     Carcinoma of left breast metastatic to bone (H)     Metastasis to bone (H)     Pain of right lower leg     Malignant neoplasm of female breast, unspecified estrogen receptor status, unspecified laterality, unspecified site of breast (H)     Schizoaffective disorder, bipolar type (H)     Psychosis, unspecified psychosis type (H)     Insomnia, unspecified type        MEDICAL REVIEW OF SYSTEMS     none in addition  to that documented above     MEDICATIONS     Current Outpatient Medications   Medication Sig Dispense Refill     anastrozole (ARIMIDEX) 1 MG tablet Take 1 tablet (1 mg) by mouth daily for 28 days 28 tablet 2     gabapentin (NEURONTIN) 300 MG capsule Take 2 capsules (600 mg) by mouth every morning AND 2 capsules (600 mg) daily at 2 pm AND 2 capsules (600 mg) At Bedtime. Do all this for 30 days. 180 capsule 2     methocarbamol (ROBAXIN) 500 MG tablet Take 1 tablet (500 mg) by mouth 3 times daily as needed for muscle spasms 30 tablet 0     morphine (MS CONTIN) 15 MG CR tablet Take 1 tablet (15 mg) by mouth every 12 hours 60 tablet 0     naloxone (NARCAN) 4 MG/0.1ML nasal spray Spray 1 spray (4 mg) into one nostril alternating nostrils once as needed for opioid reversal every 2-3 minutes until assistance arrives (Patient not taking: No sig reported) 0.2 mL 0     nicotine (COMMIT) 2 MG lozenge Place 1 lozenge (2 mg) inside cheek every hour as needed for smoking cessation 27 lozenge 3     nicotine (NICORETTE) 2 MG gum Place 1 each (2 mg) inside cheek every hour as needed for smoking cessation 90 each 1     oxyCODONE (ROXICODONE) 5 MG tablet Take 1-2 tablets (5-10 mg) by mouth every 6 hours as needed for moderate to severe pain 90 tablet 0     palbociclib (IBRANCE) 125 MG tablet Take 1 tablet (125 mg) by mouth daily Take for 21 days, then 7 days off. 21 tablet 2     pantoprazole (PROTONIX) 40 MG EC tablet Take 1 tablet (40 mg) by mouth every morning (before breakfast) 30 tablet 1     polyethylene glycol (MIRALAX) 17 GM/Dose powder Take 17 g by mouth daily as needed for constipation 578 g 3     QUEtiapine (SEROQUEL) 50 MG tablet Take 1 tablet (50mg) by mouth nightly for 4 nights, then increase to two tabs (100mg) nightly if needed for sleep 60 tablet 1     rivaroxaban ANTICOAGULANT (XARELTO) 20 MG TABS tablet Take 1 tablet (20 mg) by mouth daily (with dinner) 30 tablet 11     SENNA-docusate sodium (SENNA S) 8.6-50 MG tablet  "Take 2 tablets by mouth 2 times daily as needed (Constipation) 100 tablet 3     sennosides (SENOKOT) 8.6 MG tablet Take 1-2 tablets by mouth 2 times daily as needed for constipation 120 tablet 1     sertraline (ZOLOFT) 100 MG tablet Take one tab (100mg) by mouth every morning. Take in addition to 50 mg tablet for total dose of 150mg daily 30 tablet 1     sertraline (ZOLOFT) 50 MG tablet Take 1 tablet (50 mg) by mouth daily Take in addition to 100mg tablet for total dose of 150mg daily 30 tablet 1     tolnaftate (TINACTIN) 1 % external cream Apply topically 2 times daily 30 g 1     Vitamin D3 (CHOLECALCIFEROL) 25 mcg (1000 units) tablet Take 1 tablet (25 mcg) by mouth daily 90 tablet 3      VITALS   There were no vitals taken for this visit.    MENTAL STATUS EXAM     Alertness: alert  and oriented  Appearance: casually groomed  Behavior/Demeanor: cooperative, pleasant and calm, with good  eye contact   Speech: normal  Language: intact and no problems  Psychomotor: Rocking around at times (previously stated she does this when she is nervous)  Mood: \"Alright\"  Affect: full range; congruent to: mood- yes, content- yes  Thought Process/Associations: unremarkable  Thought Content:  Reports none;  Denies suicidal ideation  Perception:  Reports none;  Denies auditory hallucinations  Insight: good  Judgment: good  Cognition: does  appear grossly intact; formal cognitive testing was not done  Gait and Station: N/A ([facility])     LABS and DATA     PHQ 7/6/2022 10/4/2022   PHQ-9 Total Score 15 20   Q9: Thoughts of better off dead/self-harm past 2 weeks Not at all Not at all       Recent Labs   Lab Test 10/15/22  2038 08/16/22  1203   CR 0.87 0.66   GFRESTIMATED 82 >90     Recent Labs   Lab Test 10/15/22  2038 08/16/22  1203   AST 18 19   ALT 16 28   ALKPHOS 108* 108       ECG 8/10/2021 QTc = 399ms     PSYCHOTROPIC DRUG INTERACTIONS                                                       PSYCHCLINICDDI   Additive sedation, CNS " depression: most drugs on this list contribute to this     Additive serotonin: also many drugs on this list but not by way of P450 intxns     Additive QT:  Sertraline and Quetiapine    MANAGEMENT:  Monitoring for adverse effects and periodic EKGs     RISK STATEMENT for SAFETY     Cleaster did not appear to be an imminent safety risk to self or others.    TREATMENT RISK STATEMENT: The risks, benefits, alternatives and potential adverse effects have been discussed and are understood by the pt. The pt understands the risks of using street drugs or alcohol. There are no medical contraindications, the pt agrees to treatment with the ability to do so. The pt knows to call the clinic for any problems or to access emergency care if needed.  Medical and substance use concerns are documented above.  Psychotropic drug interaction check was done, including changes made today.     PSYCHIATRIST: Yasmine Castaneda MD    Patient staffed in clinic with Dr. Narvaez who will sign the note.  Supervisor is Dr. Johnson.       MEDICAL DECISION MAKING        (SmartPhrase .[email or web address])

## 2022-10-16 NOTE — PROGRESS NOTES
Oncology Visit:   Date on this visit: Oct 17, 2022    Diagnosis:  ER positive left breast cancer metastasized to bones.     Primary Physician: No Ref-Primary, Physician     History Of Present Illness:    Ms. Sanderson is a 47 year old female with a h/o tobacco abuse and DVTs with left breast cancer metastasized to bone. She presented to [facility] ED with back pain on 12/5/2020. MRI of the L-spine showed an abnormal L4 lesion with associated right paraspinal mass, abnormalities in L5 and the left iliac bone were also seen. CT C/A/P showed a left breast mass, lytic lesions of T7, L4, and the pelvis, and a 3 cm lesion in the kidney (thought to be a cyst). Ultrasound of the bilateral lower extremities showed a non-occlusive thrombus in the left popliteal vein. Mammogram and ultrasound of the bilateral breasts on 12/17/2020 showed a spiculated mass measuring at least 7.8 cm at 12-1:00 left breast extending from the nipple to 9 cm from the nipple with associated nipple retraction. This mass was biopsied, and showed IDC with surrounding DCIS, grade 3, ER+ 90%, and AR+ 75%.  HER2 was equivocal in approximately 35% of tumor cells by FISH and was negative by IHC.    Metastatic Breast Cancer Treatment:  12/23/2021 - 1/7/2021  Radiation (3000 cGy) to the lumbar spine.  1/29/2021 - present  Ibrance, zoladex, and anastrozole.  5/17/2021 radiofrequency ablation, kyphoplasty to L4  8/20/2021  Bilateral salpingo-oophorectomies, Ibrance and anastrozole.  She was off anastrozole 12/2021 - 2/2022 and off Ibrance 01/2022 - 02/2022 due to stress and impending homelessness. Off both again 03/2022-04/2022 due to death of her son but restarted in 05/2022.    Interval History:    Ms. Sanderson comes into clinic today for routine breast cancer follow-up.  She is on treatment with Ibrance and anastrozole.  She is generally tolerating this treatment well.  Unfortunately, the past 5 to 6 days she has been acutely ill with an upper respiratory  tract infection.  This is characterized as a persistent and constant cough.  The cough is at times dry, at times productive.  She now has pain in both the center of the chest and the upper back when she coughs.  She also reports pain when she takes a deep breath.  She feels that she has chest congestion and notes increased shortness of breath with activity only.  She has been coughing so hard that she now has both chest and back pain.  She also reports some congestion and drainage.  She denies fevers or chills, however does note that she is at times the last 6 days waking up with sweats.  She was seen in the emergency department on Friday, October 15 and a chest x-ray was without evidence of pneumonia.  She also had a respiratory viral panel that was negative for both COVID and influenza.     She denies new bone or joint aches or pains.  She has had mild diarrhea.  She denies swelling of her lower extremities.  She has not missed any doses of her blood thinner.  The remainder of a complete 12 point review of systems was reviewed with the patient and was negative with the exception of that mentioned above.     Past Medical/Surgical History:   Past Medical History:   Diagnosis Date     Anxiety      Breast CA (H) 12/2020     Depression      DVT (deep venous thrombosis) (H) 2014     Left breast mass     x approximately 4-5 months     Pulmonary emboli (H)      Pyelonephritis      Schizoaffective disorder (H)      Tobacco use      Past Surgical History:   Procedure Laterality Date     COLONOSCOPY N/A 7/8/2022    Procedure: COLONOSCOPY, WITH POLYPECTOMY;  Surgeon: Ham Cano MD;  Location: [facility] [place] OR     IR LUMBAR KYPHOPLASTY VERTEBRAE  5/17/2021     LAPAROSCOPIC SALPINGO-OOPHORECTOMY Bilateral 8/20/2021    Procedure: BILATERAL SALPINGO-OOPHORECTOMY, LAPAROSCOPIC;  Surgeon: Rory Lopez MD;  Location: [facility] [place] OR     TUBAL LIGATION  1998        Allergies   Allergen Reactions     Contrast Dye      Pt developed  nausea after isovue 370 injection on 6/9/21        Current Outpatient Medications   Medication     anastrozole (ARIMIDEX) 1 MG tablet     gabapentin (NEURONTIN) 300 MG capsule     methocarbamol (ROBAXIN) 500 MG tablet     morphine (MS CONTIN) 15 MG CR tablet     naloxone (NARCAN) 4 MG/0.1ML nasal spray     nicotine (COMMIT) 2 MG lozenge     nicotine (NICORETTE) 2 MG gum     oxyCODONE (ROXICODONE) 5 MG tablet     palbociclib (IBRANCE) 125 MG tablet     pantoprazole (PROTONIX) 40 MG EC tablet     polyethylene glycol (MIRALAX) 17 GM/Dose powder     QUEtiapine (SEROQUEL) 50 MG tablet     rivaroxaban ANTICOAGULANT (XARELTO) 20 MG TABS tablet     SENNA-docusate sodium (SENNA S) 8.6-50 MG tablet     sennosides (SENOKOT) 8.6 MG tablet     sertraline (ZOLOFT) 100 MG tablet     sertraline (ZOLOFT) 50 MG tablet     tolnaftate (TINACTIN) 1 % external cream     Vitamin D3 (CHOLECALCIFEROL) 25 mcg (1000 units) tablet     No current facility-administered medications for this visit.   '  Physical Exam:   BP (!) 138/90   Pulse 78   Temp 97.7  F (36.5  C) (Oral)   Resp 18   Wt 115.1 kg (253 lb 12.8 oz)   SpO2 96%   BMI 34.42 kg/m    General:  Bent over and coughing throughout the visit.  Alert and oriented x 3.  HEENT:  Normocephalic.  Sclera anicteric. Enlarged thyroid on exam.  MMM.  Lymph:  No palpable cervical, supraclavicular, or axillary LAD.  Chest:  CTA bilaterally.  No wheezes or crackles.  CV:  RRR.  Nl S1 and S2.    Abd:  Soft/NT/ND. + BS   Ext:  No edema of the bilateral lower extremities.    Musculo:  Good movement of all 4 extremities.  Neuro:  Cranial nerves grossly intact.  Gait is stable.  Psych:  Mood and affect appear normal.    Laboratory/Imaging Studies:   I personally reviewed the below images and laboratory studies:    10/17/2022 Labs:  WBC is low at 3.4 c/w palbociclib effect  Hemoglobin is wnl.  Platelets are wnl.    PET/CT 8/12/2022:  IMPRESSION: In this patient with a history of metastatic  breast  cancer, the findings suggest stable disease:  1. Unchanged hypermetabolic soft tissue nodule in the left breast  adjacent to the surgical clip. Findings remain suspicious for local  recurrent tumor. No new hypermetabolic disease in the body.  2. Stable non-FDG avid sclerotic lesions in the spine and pelvis.  3. Multinodular thyroid gland with new FDG uptake in the superior  portion of the left lobe. Consider dedicated thyroid ultrasound for  further evaluation.    ASSESSMENT/PLAN:   47 year old female with history of DVT and ER positive left breast cancer metastasized to bones.    1.  Metastatic breast cancer: Most recent PET/CT on 8/12/2022 shows ongoing response to treatment.  - Continue palbociclib and anastrozole  - Continue monthly follow-up and labs including tumor markers.   - At time of progression would consider next line treatment with abemaciclib and fulvestrant.    2.  URI:  - 10/15 COVID and influenza PCR were both negative.  Will repeat COVID PCR today as may have been early in course.  - 10/15 CXR reviewed and is without evidence of pneumonia.  No wheezes or crackles on exam today.  - Robitussin-AC prn cough prescribed today  - Cepacol lozenges prn sore throat.  - Advised to take ibu profen 600 mg every 8 hours as needed, as likely with costochondritis.  - contact the clinic if no improvement in the next couple of days    3.  Schizoaffective disorder, bipolar type: She is on treatment with Zoloft and Zyprexa.  Ongoing follow up with psychiatry.  She has increased anxiety around scans.    4.  Bone metastases/back pain:  She is s/p XRT to the lumbar spine and kyphoplasty of L4.  She is taking MS contin 15 mg PO BID and oxycodone and ibu profen prn.  She also takes methocarbamol prn muscle spasms.   - Both MS contin and oxycodone refilled today in the setting of increased back pain with cough.  - recommend weaning at upcoming appointments given excellent response to cancer therapy.  - On Zometa  since 1/18/2021 and is receiving once every 3 months. - next dose is due around 11/14/2022.    4.  DVT:  She confirms that she has continued on Xarelto.  Recommend continuing Xarelto until contraindicated given metastatic disease.      5.  Enlarged thyroid: Thyroid tests WNL last month. PET in 08/2022 showed a metabolic focus in L lobe. Recommend thyroid US to further evaluate.     6.  Follow Up:  Thyroid ultrasound within 1-2 weeks.  Labs and Zometa infusion around 11/14/2022.    Labs and ALEKSANDRA visit in 2 months.  Labs and visit with me in 4 months.  PET/CT 1-2 business days prior to return visit with me.    35 minutes spent on the date of the encounter doing chart review, review of test results, interpretation of tests, patient visit and documentation

## 2022-10-16 NOTE — ED PROVIDER NOTES
[place] EMERGENCY DEPARTMENT ([facility])  10/15/22   S       History     Chief Complaint   Patient presents with     Fatigue     Patient c/o fatigue, cough, chest tightness, and diffuse back pain o/s x3 days ago. PMH: [place] CA with mets      HPI  Teresa Sanderson is a 47 year old female with history of breast cancer (DCIS grade 3 s/p b/l salpingo oophorectomy, chemotherapy, and radiation) with mets to spine s/p radiation, Hx of PE/DVT on AC, housing instability, schizoaffective disorder, bipolar type who presents with cough, chest tightness, fatigue and diffuse back pain for the past 3 days.      Per [facility] records, patient has had 2 doses of the Pfizer COVID-19 vaccine.    Pt reports symptoms started three days prior to arrival with nonproductive cough, chest tightness, fatigue, runny nose, sore throat as well as nausea and vomiting. She notes the chest tightness is exacerbated by deep breaths. She has taken tylenol and NSAIDs at home without improvement. She denies fevers/ chills, sick contacts, dysuria, bowel/bladder incontinence, tingling/numbness in LE extremity, leg weakness, and LE swelling. She continues to take her at home AC.    Past Medical History  Past Medical History:   Diagnosis Date     Anxiety      Breast CA (H) 12/2020     Depression      DVT (deep venous thrombosis) (H) 2014     Left breast mass     x approximately 4-5 months     Pulmonary emboli (H)      Pyelonephritis      Schizoaffective disorder (H)      Tobacco use      Past Surgical History:   Procedure Laterality Date     COLONOSCOPY N/A 7/8/2022    Procedure: COLONOSCOPY, WITH POLYPECTOMY;  Surgeon: Ham Cano MD;  [place] [facility]     IR LUMBAR KYPHOPLASTY VERTEBRAE  5/17/2021     LAPAROSCOPIC SALPINGO-OOPHORECTOMY Bilateral 8/20/2021    Procedure: BILATERAL SALPINGO-OOPHORECTOMY, LAPAROSCOPIC;  Surgeon: Rory Lopez MD;  [place] [facility] [place]     TUBAL LIGATION  1998     anastrozole (ARIMIDEX) 1 MG  tablet  bisacodyl (DULCOLAX) 5 MG EC tablet  gabapentin (NEURONTIN) 300 MG capsule  magnesium citrate solution  methocarbamol (ROBAXIN) 500 MG tablet  morphine (MS CONTIN) 15 MG CR tablet  naloxone (NARCAN) 4 MG/0.1ML nasal spray  nicotine (COMMIT) 2 MG lozenge  nicotine (NICORETTE) 2 MG gum  oxyCODONE (ROXICODONE) 5 MG tablet  palbociclib (IBRANCE) 125 MG tablet  pantoprazole (PROTONIX) 40 MG EC tablet  polyethylene glycol (GOLYTELY) 236 g suspension  polyethylene glycol (MIRALAX) 17 GM/Dose powder  QUEtiapine (SEROQUEL) 50 MG tablet  rivaroxaban ANTICOAGULANT (XARELTO) 20 MG TABS tablet  SENNA-docusate sodium (SENNA S) 8.6-50 MG tablet  sennosides (SENOKOT) 8.6 MG tablet  sertraline (ZOLOFT) 100 MG tablet  sertraline (ZOLOFT) 50 MG tablet  tolnaftate (TINACTIN) 1 % external cream  Vitamin D3 (CHOLECALCIFEROL) 25 mcg (1000 units) tablet      Allergies   Allergen Reactions     Contrast Dye      Pt developed nausea after isovue 370 injection on 6/9/21      Family History  Family History   Problem Relation Age of Onset     Lung Cancer Mother      Lung Cancer Father      Lupus Brother      Kidney Disease Brother      Diabetes Daughter      Asthma Son      Cardiovascular Maternal Aunt      Hypertension Other      Diabetes Other      Deep Vein Thrombosis (DVT) No family hx of      Social History   Social History     Tobacco Use     Smoking status: Every Day     Packs/day: 0.25     Types: Cigarettes     Start date: 5/13/2011     Smokeless tobacco: Never   Substance Use Topics     Alcohol use: Not Currently     Comment: occ     Drug use: Yes     Types: Marijuana     Comment: occ      Past medical history, past surgical history, medications, allergies, family history, and social history were reviewed with the patient. No additional pertinent items.       Review of Systems   Constitutional: Positive for fatigue.   Respiratory: Positive for cough and chest tightness.    Musculoskeletal: Positive for back pain.     A complete  review of systems was performed with pertinent positives and negatives noted in the HPI, and all other systems negative.    Physical Exam   BP: (!) 165/109  Pulse: 74  Temp: 98.9  F (37.2  C)  Resp: 20  Height: 182.9 cm (6')  Weight: 119.7 kg (264 lb)  SpO2: 100 %  Physical Exam  Constitutional:       Appearance: Normal appearance.   HENT:      Nose: Congestion and rhinorrhea present.      Mouth/Throat:      Mouth: Mucous membranes are moist.      Pharynx: Posterior oropharyngeal erythema present.   Cardiovascular:      Rate and Rhythm: Normal rate and regular rhythm.   Pulmonary:      Effort: Pulmonary effort is normal. No respiratory distress.      Comments: Coarse breath sounds bilaterally  Abdominal:      Palpations: Abdomen is soft.      Tenderness: There is no abdominal tenderness.   Musculoskeletal:         General: Normal range of motion.   Lymphadenopathy:      Cervical: Cervical adenopathy present.   Skin:     General: Skin is warm and dry.   Neurological:      General: No focal deficit present.      Mental Status: She is alert and oriented to person, place, and time.   Psychiatric:         Mood and Affect: Mood normal.         Behavior: Behavior normal.       ED Course      Procedures            EKG Interpretation:      Interpreted by Onofre Barone MD  Time reviewed: 12:00AM  Symptoms at time of EKG: chest tightness   Rhythm: sinus bradycardia  Rate: Bradycardia  Axis: Normal  Ectopy: none  Conduction: normal  ST Segments/ T Waves: No acute ischemic changes  Q Waves: none  Comparison to prior: No old EKG available    Clinical Impression: normal EKG              The medical record was reviewed and interpreted.  Current labs reviewed and interpreted.  Previous labs reviewed and interpreted.              Results for orders placed or performed during the hospital encounter of 10/15/22   CBC with platelets differential     Status: None ()    Narrative    The following orders were created for panel order  CBC with platelets differential.  Procedure                               Abnormality         Status                     ---------                               -----------         ------                     CBC with platelets and d...[[id number]]                                                   Please view results for these tests on the individual orders.     Medications   acetaminophen (TYLENOL) tablet 650 mg (has no administration in time range)   ondansetron (ZOFRAN ODT) ODT tab 4 mg (has no administration in time range)        Assessments & Plan (with Medical Decision Making)   46 yo F with metastatic breast cancer, Hx of DVT/PE on AC, schizoaffective d/o presenting with symptoms of URI. Vitally stable, afebrile, not hypoxic or tachycardic. Exam notable for posterior pharyngeal erythema, rhinorrhea, shotty anterior cervical LAD. Labs with borderline leukopenia, unchanged from baseline. Covid, influenza, RSV all negative. Chest X-ray clear w/o evidence of pneumonia. Troponin negative and EKG without acute ischemic changes. Low suspicion for PE while on AC without hypoxia/tachycardia.  Symptoms likely due to upper respiratory viral infection. Giving anti-emetics and acetaminophen. Planning to discharge home with PCP follow up in one week if symptoms do not resolve.      I have reviewed the nursing notes. I have reviewed the findings, diagnosis, plan and need for follow up with the patient.    New Prescriptions    No medications on file       Final diagnoses:   None   Evelio Barone MD  IM PGY1     --    --    ED Attending Physician Attestation    I Liz Persaud MD, cared for this patient with the Resident. I have performed a history and physical examination of the patient and discussed management with the resident. I reviewed the resident's documentation above and agree with the documented findings and plan of care.    Summary of HPI, PE, ED Course   Patient is a 47 year old female with history of breast cancer on  chemotherapy evaluated in the emergency department for cough, body aches and viral symptoms. Exam notable for afebrile with normal vital signs, and mildly coarse breath sounds. ED course notable for white blood cell count 3.9, slightly higher than prior.  Normal hemoglobin.  CMP within normal limits.  Initial troponin 6.  Influenza and COVID-negative.  Chest x-ray without signs of pneumonia.  Oxygen saturation and respiratory rate normal throughout.  She is already anticoagulated on Xarelto, no signs of right heart strain to suggest large PE.  No indication for imaging or D-dimer at this time.. After the completion of care in the emergency department, the patient was discharged.  Discussed return precautions for worsening chest pain or difficulty breathing.  Also advised to return to the ED if she developed fever.  Encouraged close follow-up with her oncologist and primary doctor.    Critical Care & Procedures  Not applicable.    Medical Decision Making  The medical record was reviewed and interpreted.  Current labs reviewed and interpreted.  Current images reviewed and interpreted: No signs of pneumonia on chest x-ray.      Liz Persaud MD  Emergency Medicine       Liz Persaud MD  [facility] EMERGENCY DEPARTMENT  10/15/2022     Liz Persaud MD  Resident  10/16/22 [id number]

## 2022-10-16 NOTE — ED NOTES
ED Triage Provider Note  [facility]  Encounter Date: Oct 15, 2022    History:  Chief Complaint   Patient presents with     Fatigue     Patient c/o fatigue, cough, chest tightness, and diffuse back pain o/s x3 days ago. PMH: [place] CA with mets      Teresa Sanderson is a 47 year old female with history of stage 4 breast cancer who presents to the ED with cough, body aches and viral symptoms ongoing for 3 days.  Chest discomfort and SOB. On blood thinners.    Review of Systems:  SOB and cough    Exam:  BP (!) 165/109   Pulse 74   Temp 98.9  F (37.2  C) (Tympanic)   Resp 20   Ht 1.829 m (6')   Wt 119.7 kg (264 lb)   SpO2 100%   BMI 35.80 kg/m    General: No acute distress. Appears stated age.   Cardio: Regular rate, extremities well perfused  Resp: Normal work of breathing, crackles in the lungs  Neuro: Alert. CN II-XII grossly intact. Grossly intact strength.     Medical Decision Making:  Patient arriving to the ED with problem as above. A medical screening exam was performed. CBC, BMP, troponin, EKG, covid/flu orders initiated from Triage. The patient is appropriate to wait in triage.      Liz Persaud MD on 10/15/2022 at 9:09 PM     Liz Persaud MD  Resident  10/15/22 [id number]

## 2022-10-16 NOTE — DISCHARGE INSTRUCTIONS
Ms. Sanderson,    You came to the ED due to cough, chest tightness, nausea, vomiting, and diarrhea. You were tested for Covid and the Flu- both of which were negative. Your blood work evaluation did not show any significant changes concerning for an acute infection. Your chest x-ray did not reveal any evidence of pneumonia in your lungs. Your symptoms are likely due to a viral infection that will resolve. You were given an anti-nausea medication and tylenol for pain. You should continue to drink plenty of fluids at home and take tylenol as needed for pain. You should return to the emergency department if your symptoms worsens with the development of fevers, if you have difficulty breathing, if you have worsening chest pain, or if you are unable to eat or drink due to persistent vomiting. You should follow up with your primary care physician in one week unless your symptoms completely resolve.    Please make an appointment to follow up with Your Primary Care Provider in 7 days unless symptoms completely resolve.    Results for orders placed or performed during the hospital encounter of 10/15/22   XR Chest 2 Views     Status: None    Narrative    EXAM: XR CHEST 2 VIEWS  LOCATION: [facility]  DATE/TIME: 10/15/2022 11:01 PM    INDICATION: Shortness of breath.  COMPARISON: None.      Impression    IMPRESSION:     No focal airspace disease. No pleural effusion or pneumothorax.    The cardiomediastinal silhouette is unremarkable.    Dextroconvex curvature of the thoracic spine. Multilevel degenerative changes of the spine.   Comprehensive metabolic panel     Status: Abnormal   Result Value Ref Range    Sodium 141 136 - 145 mmol/L    Potassium 3.7 3.4 - 5.3 mmol/L    Chloride 105 98 - 107 mmol/L    Carbon Dioxide (CO2) 25 22 - 29 mmol/L    Anion Gap 11 7 - 15 mmol/L    Urea Nitrogen 13.2 6.0 - 20.0 mg/dL    Creatinine 0.87 0.51 - 0.95 mg/dL    Calcium 9.8 8.6 - 10.0 mg/dL     Glucose 127 (H) 70 - 99 mg/dL    Alkaline Phosphatase 108 (H) 35 - 104 U/L    AST 18 10 - 35 U/L    ALT 16 10 - 35 U/L    Protein Total 7.8 6.4 - 8.3 g/dL    Albumin 4.3 3.5 - 5.2 g/dL    Bilirubin Total 0.2 <=1.2 mg/dL    GFR Estimate 82 >60 mL/min/1.73m2   Troponin T, High Sensitivity     Status: Normal   Result Value Ref Range    Troponin T, High Sensitivity 6 <=14 ng/L   Symptomatic; Yes; 10/12/2022 Influenza A/B & SARS-CoV2 (COVID-19) Virus PCR Multiplex Nose     Status: Normal    Specimen: Nose; Swab   Result Value Ref Range    Influenza A PCR Negative Negative    Influenza B PCR Negative Negative    RSV PCR Negative Negative    SARS CoV2 PCR Negative Negative    Narrative    Testing was performed using the Xpert Xpress CoV2/Flu/RSV Assay on the Cepheid GeneXpert Instrument. This test should be ordered for the detection of SARS-CoV-2 and influenza viruses in individuals who meet clinical and/or epidemiological criteria. Test performance is unknown in asymptomatic patients. This test is for in vitro diagnostic use under the FDA EUA for laboratories certified under CLIA to perform high or moderate complexity testing. This test has not been FDA cleared or approved. A negative result does not rule out the presence of PCR inhibitors in the specimen or target RNA in concentration below the limit of detection for the assay. If only one viral target is positive but coinfection with multiple targets is suspected, the sample should be re-tested with another FDA cleared, approved, or authorized test, if coinfection would change clinical management. This test was validated by the [facility]. These laboratories are certified under the Clinical Laboratory Improvement Amendments of 1988 (CLIA-88) as qualified to perform high complexity laboratory testing.   CBC with platelets and differential     Status: Abnormal   Result Value Ref Range    WBC Count 3.9 (L) 4.0 - 11.0 10e3/uL    RBC Count 3.66 (L) 3.80 -  5.20 10e6/uL    Hemoglobin 12.1 11.7 - 15.7 g/dL    Hematocrit 36.1 35.0 - 47.0 %    MCV 99 78 - 100 fL    MCH 33.1 (H) 26.5 - 33.0 pg    MCHC 33.5 31.5 - 36.5 g/dL    RDW 13.5 10.0 - 15.0 %    Platelet Count 210 150 - 450 10e3/uL    % Neutrophils 56 %    % Lymphocytes 33 %    % Monocytes 5 %    % Eosinophils 3 %    % Basophils 2 %    % Immature Granulocytes 1 %    NRBCs per 100 WBC 0 <1 /100    Absolute Neutrophils 2.3 1.6 - 8.3 10e3/uL    Absolute Lymphocytes 1.3 0.8 - 5.3 10e3/uL    Absolute Monocytes 0.2 0.0 - 1.3 10e3/uL    Absolute Eosinophils 0.1 0.0 - 0.7 10e3/uL    Absolute Basophils 0.1 0.0 - 0.2 10e3/uL    Absolute Immature Granulocytes 0.0 <=0.4 10e3/uL    Absolute NRBCs 0.0 10e3/uL   EKG 12 lead     Status: None (Preliminary result)   Result Value Ref Range    Systolic Blood Pressure  mmHg    Diastolic Blood Pressure  mmHg    Ventricular Rate 57 BPM    Atrial Rate 57 BPM    AR Interval 166 ms    QRS Duration 92 ms     ms    QTc 436 ms    P Axis 56 degrees    R AXIS 24 degrees    T Axis 38 degrees    Interpretation ECG Sinus bradycardia  Otherwise normal ECG      CBC with platelets differential     Status: Abnormal    Narrative    The following orders were created for panel order CBC with platelets differential.  Procedure                               Abnormality         Status                     ---------                               -----------         ------                     CBC with platelets and d...[[id number]]  Abnormal            Final result                 Please view results for these tests on the individual orders.            You were given anti-emetics and pain medication.

## 2022-10-17 ENCOUNTER — ONCOLOGY VISIT (OUTPATIENT)
Dept: ONCOLOGY | Facility: CLINIC | Age: 47
End: 2022-10-17
Attending: INTERNAL MEDICINE
Payer: COMMERCIAL

## 2022-10-17 ENCOUNTER — LAB (OUTPATIENT)
Dept: LAB | Facility: CLINIC | Age: 47
End: 2022-10-17
Attending: INTERNAL MEDICINE
Payer: COMMERCIAL

## 2022-10-17 ENCOUNTER — VIRTUAL VISIT (OUTPATIENT)
Dept: PSYCHIATRY | Facility: CLINIC | Age: 47
End: 2022-10-17
Attending: PSYCHIATRY & NEUROLOGY
Payer: COMMERCIAL

## 2022-10-17 VITALS
BODY MASS INDEX: 34.42 KG/M2 | WEIGHT: 253.8 LBS | HEART RATE: 78 BPM | SYSTOLIC BLOOD PRESSURE: 138 MMHG | RESPIRATION RATE: 18 BRPM | TEMPERATURE: 97.7 F | DIASTOLIC BLOOD PRESSURE: 90 MMHG | OXYGEN SATURATION: 96 %

## 2022-10-17 DIAGNOSIS — C79.51 CARCINOMA OF LEFT BREAST METASTATIC TO BONE (H): Primary | ICD-10-CM

## 2022-10-17 DIAGNOSIS — J02.9 SORE THROAT: ICD-10-CM

## 2022-10-17 DIAGNOSIS — Z51.11 ENCOUNTER FOR ANTINEOPLASTIC CHEMOTHERAPY: ICD-10-CM

## 2022-10-17 DIAGNOSIS — G89.3 CANCER ASSOCIATED PAIN: ICD-10-CM

## 2022-10-17 DIAGNOSIS — F25.0 SCHIZOAFFECTIVE DISORDER, BIPOLAR TYPE (H): Primary | ICD-10-CM

## 2022-10-17 DIAGNOSIS — E04.1 THYROID NODULE: ICD-10-CM

## 2022-10-17 DIAGNOSIS — R94.8 ABNORMAL POSITRON EMISSION TOMOGRAPHY (PET) SCAN: ICD-10-CM

## 2022-10-17 DIAGNOSIS — C50.912 CARCINOMA OF LEFT BREAST METASTATIC TO BONE (H): Primary | ICD-10-CM

## 2022-10-17 DIAGNOSIS — J06.9 VIRAL UPPER RESPIRATORY TRACT INFECTION: ICD-10-CM

## 2022-10-17 DIAGNOSIS — Z63.4 BEREAVEMENT: ICD-10-CM

## 2022-10-17 DIAGNOSIS — C50.812 MALIGNANT NEOPLASM OF OVERLAPPING SITES OF LEFT BREAST IN FEMALE, ESTROGEN RECEPTOR POSITIVE (H): ICD-10-CM

## 2022-10-17 DIAGNOSIS — Z17.0 MALIGNANT NEOPLASM OF OVERLAPPING SITES OF LEFT BREAST IN FEMALE, ESTROGEN RECEPTOR POSITIVE (H): ICD-10-CM

## 2022-10-17 LAB
ALBUMIN SERPL BCG-MCNC: 4 G/DL (ref 3.5–5.2)
ALP SERPL-CCNC: 105 U/L (ref 35–104)
ALT SERPL W P-5'-P-CCNC: 13 U/L (ref 10–35)
ANION GAP SERPL CALCULATED.3IONS-SCNC: 11 MMOL/L (ref 7–15)
AST SERPL W P-5'-P-CCNC: 17 U/L (ref 10–35)
BASOPHILS # BLD MANUAL: 0 10E3/UL (ref 0–0.2)
BASOPHILS NFR BLD MANUAL: 1 %
BILIRUB SERPL-MCNC: 0.3 MG/DL
BUN SERPL-MCNC: 9.3 MG/DL (ref 6–20)
CALCIUM SERPL-MCNC: 9.5 MG/DL (ref 8.6–10)
CANCER AG15-3 SERPL-ACNC: 40 U/ML
CHLORIDE SERPL-SCNC: 106 MMOL/L (ref 98–107)
CREAT SERPL-MCNC: 0.82 MG/DL (ref 0.51–0.95)
DEPRECATED HCO3 PLAS-SCNC: 24 MMOL/L (ref 22–29)
EOSINOPHIL # BLD MANUAL: 0.1 10E3/UL (ref 0–0.7)
EOSINOPHIL NFR BLD MANUAL: 3 %
ERYTHROCYTE [DISTWIDTH] IN BLOOD BY AUTOMATED COUNT: 13.2 % (ref 10–15)
GFR SERPL CREATININE-BSD FRML MDRD: 88 ML/MIN/1.73M2
GLUCOSE SERPL-MCNC: 119 MG/DL (ref 70–99)
HCT VFR BLD AUTO: 34.3 % (ref 35–47)
HGB BLD-MCNC: 11.7 G/DL (ref 11.7–15.7)
LYMPHOCYTES # BLD MANUAL: 1.1 10E3/UL (ref 0.8–5.3)
LYMPHOCYTES NFR BLD MANUAL: 33 %
MCH RBC QN AUTO: 33.1 PG (ref 26.5–33)
MCHC RBC AUTO-ENTMCNC: 34.1 G/DL (ref 31.5–36.5)
MCV RBC AUTO: 97 FL (ref 78–100)
MONOCYTES # BLD MANUAL: 0 10E3/UL (ref 0–1.3)
MONOCYTES NFR BLD MANUAL: 1 %
NEUTROPHILS # BLD MANUAL: 2.1 10E3/UL (ref 1.6–8.3)
NEUTROPHILS NFR BLD MANUAL: 62 %
PLAT MORPH BLD: NORMAL
PLATELET # BLD AUTO: 226 10E3/UL (ref 150–450)
POTASSIUM SERPL-SCNC: 3.8 MMOL/L (ref 3.4–5.3)
PROT SERPL-MCNC: 7.4 G/DL (ref 6.4–8.3)
RBC # BLD AUTO: 3.53 10E6/UL (ref 3.8–5.2)
RBC MORPH BLD: NORMAL
SODIUM SERPL-SCNC: 141 MMOL/L (ref 136–145)
WBC # BLD AUTO: 3.4 10E3/UL (ref 4–11)

## 2022-10-17 PROCEDURE — 82040 ASSAY OF SERUM ALBUMIN: CPT | Performed by: INTERNAL MEDICINE

## 2022-10-17 PROCEDURE — 82378 CARCINOEMBRYONIC ANTIGEN: CPT | Performed by: INTERNAL MEDICINE

## 2022-10-17 PROCEDURE — 85007 BL SMEAR W/DIFF WBC COUNT: CPT | Performed by: INTERNAL MEDICINE

## 2022-10-17 PROCEDURE — 99214 OFFICE O/P EST MOD 30 MIN: CPT | Mod: GC | Performed by: STUDENT IN AN ORGANIZED HEALTH CARE EDUCATION/TRAINING PROGRAM

## 2022-10-17 PROCEDURE — G0463 HOSPITAL OUTPT CLINIC VISIT: HCPCS

## 2022-10-17 PROCEDURE — U0005 INFEC AGEN DETEC AMPLI PROBE: HCPCS

## 2022-10-17 PROCEDURE — 36415 COLL VENOUS BLD VENIPUNCTURE: CPT | Performed by: INTERNAL MEDICINE

## 2022-10-17 PROCEDURE — 85027 COMPLETE CBC AUTOMATED: CPT | Performed by: INTERNAL MEDICINE

## 2022-10-17 PROCEDURE — 86300 IMMUNOASSAY TUMOR CA 15-3: CPT | Performed by: INTERNAL MEDICINE

## 2022-10-17 PROCEDURE — 80053 COMPREHEN METABOLIC PANEL: CPT | Performed by: INTERNAL MEDICINE

## 2022-10-17 PROCEDURE — 99214 OFFICE O/P EST MOD 30 MIN: CPT | Mod: CS | Performed by: INTERNAL MEDICINE

## 2022-10-17 RX ORDER — SERTRALINE HYDROCHLORIDE 100 MG/1
TABLET, FILM COATED ORAL
Qty: 30 TABLET | Refills: 1 | Status: SHIPPED | OUTPATIENT
Start: 2022-10-17 | End: 2022-11-14

## 2022-10-17 RX ORDER — MORPHINE SULFATE 15 MG/1
15 TABLET, FILM COATED, EXTENDED RELEASE ORAL EVERY 12 HOURS
Qty: 60 TABLET | Refills: 0 | Status: SHIPPED | OUTPATIENT
Start: 2022-10-17 | End: 2022-11-15

## 2022-10-17 RX ORDER — BENZOCAINE AND MENTHOL, UNSPECIFIED FORM 15; 2.3 MG/1; MG/1
1 LOZENGE ORAL EVERY 4 HOURS PRN
Qty: 30 LOZENGE | Refills: 1 | Status: SHIPPED | OUTPATIENT
Start: 2022-10-17 | End: 2023-01-05

## 2022-10-17 RX ORDER — QUETIAPINE FUMARATE 50 MG/1
150 TABLET, FILM COATED ORAL AT BEDTIME
Qty: 90 TABLET | Refills: 1 | Status: SHIPPED | OUTPATIENT
Start: 2022-10-17 | End: 2022-11-14

## 2022-10-17 RX ORDER — CODEINE PHOSPHATE/GUAIFENESIN 10-100MG/5
5 LIQUID (ML) ORAL EVERY 4 HOURS PRN
Qty: 180 ML | Refills: 1 | Status: SHIPPED | OUTPATIENT
Start: 2022-10-17 | End: 2023-01-05

## 2022-10-17 RX ORDER — OXYCODONE HYDROCHLORIDE 5 MG/1
5-10 TABLET ORAL EVERY 6 HOURS PRN
Qty: 90 TABLET | Refills: 0 | Status: SHIPPED | OUTPATIENT
Start: 2022-10-17 | End: 2022-11-11

## 2022-10-17 SDOH — SOCIAL STABILITY - SOCIAL INSECURITY: DISSAPEARANCE AND DEATH OF FAMILY MEMBER: Z63.4

## 2022-10-17 ASSESSMENT — PAIN SCALES - GENERAL: PAINLEVEL: WORST PAIN (10)

## 2022-10-17 NOTE — PROGRESS NOTES
Teresa Sanderson is a 47 year old who is being evaluated via a billable video visit.      Pt will join video visit via: [employer]  If there are problems joining the visit, send backup video invite via: Text to preferred phone: [phone]    Reason for telehealth visit: Patient has requested telehealth visit    Originating location (patient location): Patient's home    Will anyone else be joining the visit? No

## 2022-10-17 NOTE — PATIENT INSTRUCTIONS
**For crisis resources, please see the information at the end of this document**   Patient Education    Thank you for coming to the [facility].     Lab Testing:  If you had lab testing today and your results are reassuring or normal they will be mailed to you or sent through [employer] within 7 days. If the lab tests need quick action we will call you with the results. The phone number we will call with results is # [phone]. If this is not the best number please call our clinic and change the number.     Medication Refills:  If you need any refills please call your pharmacy and they will contact us. Our fax number for refills is [phone].   Three business days of notice are needed for general medication refill requests.   Five business days of notice are needed for controlled substance refill requests.   If you need to change to a different pharmacy, please contact the new pharmacy directly. The new pharmacy will help you get your medications transferred.     Contact Us:  Please call [phone] during business hours (8-5:00 M-F).   If you have medication related questions after clinic hours, or on the weekend, please call [phone].     Financial Assistance [phone]   Medical Records [phone]       MENTAL HEALTH CRISIS RESOURCES:  For a emergency help, please call 911 or go to the nearest Emergency Department.     Emergency Walk-In Options:   [facility] Unit @ [facility] ([place]): [phone] - Specialized mental health emergency area designed to be calming  [facility] ([place]): [phone]  [facility] ([place]): [phone]  [facility]): [phone]    [facility] Crisis Information:   [place]: [phone]  [place]: [phone]  [place] ([place]) - Adult: [phone]     Child: [phone]  [place] - Adult: [phone]     Child: [phone]  [place]:  [phone]  List of all [facility] resources:   https://[place].gov/dhs/people-we-serve/adults/health-care/mental-health/resources/crisis-contacts.jsp    National Crisis Information:   Crisis Text Line: Text  [place]  to 996420  Suicide & Crisis Lifeline: 988  National Suicide Prevention Lifeline: 7-364-348-TALK (1-230.288.5941)       For online chat options, visit https://suicidepreventionlifeline.org/chat/  Poison Control Center: 0-989-761-4023  Trans Lifeline: 8-315-247-4852 - Hotline for transgender people of all ages  [employer]: 1-146-048-7721 - Hotline for LGBT youth     For Non-Emergency Support:   Fast Tracker: Mental Health & Substance Use Disorder Resources -   https://www.[place].org/

## 2022-10-17 NOTE — NURSING NOTE
Chief Complaint   Patient presents with     Blood Draw     VPT blood draw by lab RN. Vitals taken and appointment arrived     Liz Alvarado RN

## 2022-10-17 NOTE — NURSING NOTE
Oncology Rooming Note    October 17, 2022 4:24 PM   Teresa Sanderson is a 47 year old female who presents for:    Chief Complaint   Patient presents with     Blood Draw     VPT blood draw by lab RN. Vitals taken and appointment arrived     Oncology Clinic Visit     Breast cancer     Initial Vitals: BP (!) 138/90   Pulse 78   Temp 97.7  F (36.5  C) (Oral)   Resp 18   Wt 115.1 kg (253 lb 12.8 oz)   SpO2 96%   BMI 34.42 kg/m   Estimated body mass index is 34.42 kg/m  as calculated from the following:    Height as of 10/15/22: 1.829 m (6').    Weight as of this encounter: 115.1 kg (253 lb 12.8 oz). Body surface area is 2.42 meters squared.  Worst Pain (10) Comment: whole body   No LMP recorded. (Menstrual status: Tubal ).  Allergies reviewed: Yes  Medications reviewed: Yes    Medications: MEDICATION REFILLS NEEDED TODAY. Provider was notified. Morphine, methocarbamol and oxycodone need refill    Pharmacy name entered into EPIC:    [facility] - [place], [place] - [address] [place] [address] [facility]  [id number] - [place], [place] - [address] AT [facility] [address]  [facility] - [place], [place] - [address]  [facility]IV[facility] - [place], [place] - [address] [facility]  [facility] - [place], [place] - [address] [id number] - [place], [place] - [address] [facility]  [facility]    Clinical concerns: sick with a cold, extreme pain today. Pain is worse with coughing, makes her whole body hurt. Says hospital didn't give her any meds.Is wondering if we can give her anything for her cough, also has been out of pain meds for about 2 weeks.      Tootie Oshea, [facility]

## 2022-10-17 NOTE — LETTER
10/17/2022         RE: Teresa Sanderson  [address] [address]        Dear Colleague,    Thank you for referring your patient, Teresa Sanderson, to the [facility]. Please see a copy of my visit note below.    Oncology Visit:   Date on this visit: Oct 17, 2022    Diagnosis:  ER positive left breast cancer metastasized to bones.     Primary Physician: No Ref-Primary, Physician     History Of Present Illness:    Ms. Sanderson is a 47 year old female with a h/o tobacco abuse and DVTs with left breast cancer metastasized to bone. She presented to [facility] ED with back pain on 12/5/2020. MRI of the L-spine showed an abnormal L4 lesion with associated right paraspinal mass, abnormalities in L5 and the left iliac bone were also seen. CT C/A/P showed a left breast mass, lytic lesions of T7, L4, and the pelvis, and a 3 cm lesion in the kidney (thought to be a cyst). Ultrasound of the bilateral lower extremities showed a non-occlusive thrombus in the left popliteal vein. Mammogram and ultrasound of the bilateral breasts on 12/17/2020 showed a spiculated mass measuring at least 7.8 cm at 12-1:00 left breast extending from the nipple to 9 cm from the nipple with associated nipple retraction. This mass was biopsied, and showed IDC with surrounding DCIS, grade 3, ER+ 90%, and NJ+ 75%.  HER2 was equivocal in approximately 35% of tumor cells by FISH and was negative by IHC.    Metastatic Breast Cancer Treatment:  12/23/2021 - 1/7/2021  Radiation (3000 cGy) to the lumbar spine.  1/29/2021 - present  Ibrance, zoladex, and anastrozole.  5/17/2021 radiofrequency ablation, kyphoplasty to L4  8/20/2021  Bilateral salpingo-oophorectomies, Ibrance and anastrozole.  She was off anastrozole 12/2021 - 2/2022 and off Ibrance 01/2022 - 02/2022 due to stress and impending homelessness. Off both again 03/2022-04/2022 due to death of her son but restarted in 05/2022.    Interval History:    Ms.  Kin comes into clinic today for routine breast cancer follow-up.  She is on treatment with Ibrance and anastrozole.  She is generally tolerating this treatment well.  Unfortunately, the past 5 to 6 days she has been acutely ill with an upper respiratory tract infection.  This is characterized as a persistent and constant cough.  The cough is at times dry, at times productive.  She now has pain in both the center of the chest and the upper back when she coughs.  She also reports pain when she takes a deep breath.  She feels that she has chest congestion and notes increased shortness of breath with activity only.  She has been coughing so hard that she now has both chest and back pain.  She also reports some congestion and drainage.  She denies fevers or chills, however does note that she is at times the last 6 days waking up with sweats.  She was seen in the emergency department on Friday, October 15 and a chest x-ray was without evidence of pneumonia.  She also had a respiratory viral panel that was negative for both COVID and influenza.     She denies new bone or joint aches or pains.  She has had mild diarrhea.  She denies swelling of her lower extremities.  She has not missed any doses of her blood thinner.  The remainder of a complete 12 point review of systems was reviewed with the patient and was negative with the exception of that mentioned above.     Past Medical/Surgical History:   Past Medical History:   Diagnosis Date     Anxiety      Breast CA (H) 12/2020     Depression      DVT (deep venous thrombosis) (H) 2014     Left breast mass     x approximately 4-5 months     Pulmonary emboli (H)      Pyelonephritis      Schizoaffective disorder (H)      Tobacco use      Past Surgical History:   Procedure Laterality Date     COLONOSCOPY N/A 7/8/2022    Procedure: COLONOSCOPY, WITH POLYPECTOMY;  Surgeon: Ham Cano MD;  [facility] [place] [place]     IR LUMBAR KYPHOPLASTY VERTEBRAE  5/17/2021     LAPAROSCOPIC  SALPINGO-OOPHORECTOMY Bilateral 8/20/2021    Procedure: BILATERAL SALPINGO-OOPHORECTOMY, LAPAROSCOPIC;  Surgeon: Rory Lopez MD;  Location: [facility] OR     TUBAL LIGATION  1998        Allergies   Allergen Reactions     Contrast Dye      Pt developed nausea after isovue 370 injection on 6/9/21        Current Outpatient Medications   Medication     anastrozole (ARIMIDEX) 1 MG tablet     gabapentin (NEURONTIN) 300 MG capsule     methocarbamol (ROBAXIN) 500 MG tablet     morphine (MS CONTIN) 15 MG CR tablet     naloxone (NARCAN) 4 MG/0.1ML nasal spray     nicotine (COMMIT) 2 MG lozenge     nicotine (NICORETTE) 2 MG gum     oxyCODONE (ROXICODONE) 5 MG tablet     palbociclib (IBRANCE) 125 MG tablet     pantoprazole (PROTONIX) 40 MG EC tablet     polyethylene glycol (MIRALAX) 17 GM/Dose powder     QUEtiapine (SEROQUEL) 50 MG tablet     rivaroxaban ANTICOAGULANT (XARELTO) 20 MG TABS tablet     SENNA-docusate sodium (SENNA S) 8.6-50 MG tablet     sennosides (SENOKOT) 8.6 MG tablet     sertraline (ZOLOFT) 100 MG tablet     sertraline (ZOLOFT) 50 MG tablet     tolnaftate (TINACTIN) 1 % external cream     Vitamin D3 (CHOLECALCIFEROL) 25 mcg (1000 units) tablet     No current facility-administered medications for this visit.   '  Physical Exam:   BP (!) 138/90   Pulse 78   Temp 97.7  F (36.5  C) (Oral)   Resp 18   Wt 115.1 kg (253 lb 12.8 oz)   SpO2 96%   BMI 34.42 kg/m    General:  Bent over and coughing throughout the visit.  Alert and oriented x 3.  HEENT:  Normocephalic.  Sclera anicteric. Enlarged thyroid on exam.  MMM.  Lymph:  No palpable cervical, supraclavicular, or axillary LAD.  Chest:  CTA bilaterally.  No wheezes or crackles.  CV:  RRR.  Nl S1 and S2.    Abd:  Soft/NT/ND. + BS   Ext:  No edema of the bilateral lower extremities.    Musculo:  Good movement of all 4 extremities.  Neuro:  Cranial nerves grossly intact.  Gait is stable.  Psych:  Mood and affect appear normal.    Laboratory/Imaging  Studies:   I personally reviewed the below images and laboratory studies:    10/17/2022 Labs:  WBC is low at 3.4 c/w palbociclib effect  Hemoglobin is wnl.  Platelets are wnl.    PET/CT 8/12/2022:  IMPRESSION: In this patient with a history of metastatic breast  cancer, the findings suggest stable disease:  1. Unchanged hypermetabolic soft tissue nodule in the left breast  adjacent to the surgical clip. Findings remain suspicious for local  recurrent tumor. No new hypermetabolic disease in the body.  2. Stable non-FDG avid sclerotic lesions in the spine and pelvis.  3. Multinodular thyroid gland with new FDG uptake in the superior  portion of the left lobe. Consider dedicated thyroid ultrasound for  further evaluation.    ASSESSMENT/PLAN:   47 year old female with history of DVT and ER positive left breast cancer metastasized to bones.    1.  Metastatic breast cancer: Most recent PET/CT on 8/12/2022 shows ongoing response to treatment.  - Continue palbociclib and anastrozole  - Continue monthly follow-up and labs including tumor markers.   - At time of progression would consider next line treatment with abemaciclib and fulvestrant.    2.  URI:  - 10/15 COVID and influenza PCR were both negative.  Will repeat COVID PCR today as may have been early in course.  - 10/15 CXR reviewed and is without evidence of pneumonia.  No wheezes or crackles on exam today.  - Robitussin-AC prn cough prescribed today  - Cepacol lozenges prn sore throat.  - Advised to take ibu profen 600 mg every 8 hours as needed, as likely with costochondritis.  - contact the clinic if no improvement in the next couple of days    3.  Schizoaffective disorder, bipolar type: She is on treatment with Zoloft and Zyprexa.  Ongoing follow up with psychiatry.  She has increased anxiety around scans.    4.  Bone metastases/back pain:  She is s/p XRT to the lumbar spine and kyphoplasty of L4.  She is taking MS contin 15 mg PO BID and oxycodone and ibu profen  prn.  She also takes methocarbamol prn muscle spasms.   - Both MS contin and oxycodone refilled today in the setting of increased back pain with cough.  - recommend weaning at upcoming appointments given excellent response to cancer therapy.  - On Zometa since 1/18/2021 and is receiving once every 3 months. - next dose is due around 11/14/2022.    4.  DVT:  She confirms that she has continued on Xarelto.  Recommend continuing Xarelto until contraindicated given metastatic disease.      5.  Enlarged thyroid: Thyroid tests WNL last month. PET in 08/2022 showed a metabolic focus in L lobe. Recommend thyroid US to further evaluate.     6.  Follow Up:  Thyroid ultrasound within 1-2 weeks.  Labs and Zometa infusion around 11/14/2022.    Labs and ALEKSANDRA visit in 2 months.  Labs and visit with me in 4 months.  PET/CT 1-2 business days prior to return visit with me.    35 minutes spent on the date of the encounter doing chart review, review of test results, interpretation of tests, patient visit and documentation         Again, thank you for allowing me to participate in the care of your patient.      Sincerely,    Jahaira Plunkett MD

## 2022-10-18 ENCOUNTER — TELEPHONE (OUTPATIENT)
Dept: ONCOLOGY | Facility: CLINIC | Age: 47
End: 2022-10-18

## 2022-10-18 LAB
CEA SERPL-MCNC: 3.8 NG/ML
SARS-COV-2 RNA RESP QL NAA+PROBE: NEGATIVE

## 2022-10-18 NOTE — TELEPHONE ENCOUNTER
Eloisa - pharmacist wanted clarification if provider wanted        and is wondering if alternative without codeine safer for patient b/c pt also on oxycodone and morphine already.     Pharmacist recommendation would be a cough syrup without codeine.    1108 Per Dr. Plunkett, Is aware of interactions, would like to continue with cough medicine with codeine as originally prescribed.     This writer updated [facility] to continue to fill prescriptions sent by  Dr. Plunkett.

## 2022-10-19 ENCOUNTER — TELEPHONE (OUTPATIENT)
Dept: PSYCHIATRY | Facility: CLINIC | Age: 47
End: 2022-10-19

## 2022-10-21 NOTE — TELEPHONE ENCOUNTER
SVETA received voicemail from [facility]. She found an apartment but does not have the deposit. She was told about help from the [facility] and wants more information.    SVETA returned call and talked with [facility]. She and her son were approved for an apartment but do not have the $1,000 for deposit. She has not used emergency assistance through the [facility] before. Writer believes she would be eligible. Provided information (once per year use). Writer will My chart [facility] Economic Assistance number as well as their website.    [facility] reports recently going to  for a cough. During call she exhibited a continuous cough and sounded short of breath. Writer encouraged her to seek additional medical treatment.    DANA Rajput, [facility]  [phone]

## 2022-11-02 ENCOUNTER — TELEPHONE (OUTPATIENT)
Dept: PSYCHIATRY | Facility: CLINIC | Age: 47
End: 2022-11-02

## 2022-11-02 NOTE — TELEPHONE ENCOUNTER
SVETA left voicemail for [facility]. SW following up on how her housing search if going. Provided contact number if she needs additional support.    Karma Mujica, DANA, LICSW  [phone]

## 2022-11-09 NOTE — PROGRESS NOTES
"Social Work Follow-Up Encounter Visit  Oncology Clinic    Data/Intervention:  Patient Name:  Teresa Sanderson  /Age:  1975 (47 year old)    Reason for Follow-Up:  social supports    Collaborated With:    -Patient  -  Assessment:  SW received a call from patient regarding assistance needed with housing paperwork. Patient stated they wanted to send some paperwork for SW to assist with an asked for SW's contact information. Patient stated they had previously tried to send paperwork to SW but were having trouble with sending the paperwork. Patient asked for SW's email to check that had the correct information. SW provided their email address and patient confirmed they had previously written the address down incorrectly. Patient stated they are \"not the best with computers\". Patient stated they would try to send the paperwork again to SW. Patient stated if they had any trouble they would ask their son for help in sending the paperwork. SW discussed that patient could call SW back if they had any further trouble with it and they could discuss how to get paperwork to SW. SW also suggested that if patient was able to they could mail or drop paper work off to the clinic if that worked better. Patient asked if they could share their email with the agency helping them with housing as they could also see if the agency could email the paperwork to SW if they continued to have trouble with emailing the paperwork. SW gave permission for patient to share email as needed to assist in getting paperwork to SW. Patient stated they would plan to reach out to the agency they are working with and provide them with SW's email if they are continuing to have trouble with sending the email themselves. Patient agreed to call back if further assistance was needed.     Plan:  Previously provided patient/family with writer's contact information and availability.   SW will remain available as needed for on going supports.     Jo Jacome " EUGENE CORTEZ  - Oncology  Phone : [phone]  Pager: [phone]

## 2022-11-10 RX ORDER — HEPARIN SODIUM (PORCINE) LOCK FLUSH IV SOLN 100 UNIT/ML 100 UNIT/ML
5 SOLUTION INTRAVENOUS
Status: CANCELLED | OUTPATIENT
Start: 2022-11-19

## 2022-11-10 RX ORDER — ZOLEDRONIC ACID 0.04 MG/ML
4 INJECTION, SOLUTION INTRAVENOUS ONCE
Status: CANCELLED | OUTPATIENT
Start: 2022-11-19 | End: 2022-11-19

## 2022-11-10 RX ORDER — HEPARIN SODIUM,PORCINE 10 UNIT/ML
5 VIAL (ML) INTRAVENOUS
Status: CANCELLED | OUTPATIENT
Start: 2022-11-19

## 2022-11-11 ENCOUNTER — MYC REFILL (OUTPATIENT)
Dept: ONCOLOGY | Facility: CLINIC | Age: 47
End: 2022-11-11

## 2022-11-11 ENCOUNTER — ANCILLARY PROCEDURE (OUTPATIENT)
Dept: ULTRASOUND IMAGING | Facility: CLINIC | Age: 47
End: 2022-11-11
Attending: INTERNAL MEDICINE
Payer: COMMERCIAL

## 2022-11-11 DIAGNOSIS — Z91.041 ALLERGY TO IMAGING CONTRAST MEDIA: ICD-10-CM

## 2022-11-11 DIAGNOSIS — Z17.0 MALIGNANT NEOPLASM OF OVERLAPPING SITES OF LEFT BREAST IN FEMALE, ESTROGEN RECEPTOR POSITIVE (H): ICD-10-CM

## 2022-11-11 DIAGNOSIS — C79.51 CARCINOMA OF LEFT BREAST METASTATIC TO BONE (H): ICD-10-CM

## 2022-11-11 DIAGNOSIS — G89.3 CANCER ASSOCIATED PAIN: ICD-10-CM

## 2022-11-11 DIAGNOSIS — C50.812 MALIGNANT NEOPLASM OF OVERLAPPING SITES OF LEFT BREAST IN FEMALE, ESTROGEN RECEPTOR POSITIVE (H): ICD-10-CM

## 2022-11-11 DIAGNOSIS — E04.1 THYROID NODULE: ICD-10-CM

## 2022-11-11 DIAGNOSIS — C50.912 CARCINOMA OF LEFT BREAST METASTATIC TO BONE (H): ICD-10-CM

## 2022-11-11 DIAGNOSIS — M79.652 PAIN OF LEFT THIGH: Primary | ICD-10-CM

## 2022-11-11 DIAGNOSIS — R94.8 ABNORMAL POSITRON EMISSION TOMOGRAPHY (PET) SCAN: ICD-10-CM

## 2022-11-11 DIAGNOSIS — C79.51 METASTASIS TO BONE (H): ICD-10-CM

## 2022-11-11 DIAGNOSIS — M79.661 PAIN OF RIGHT LOWER LEG: ICD-10-CM

## 2022-11-11 PROCEDURE — 76536 US EXAM OF HEAD AND NECK: CPT | Performed by: STUDENT IN AN ORGANIZED HEALTH CARE EDUCATION/TRAINING PROGRAM

## 2022-11-11 RX ORDER — METHOCARBAMOL 500 MG/1
500 TABLET, FILM COATED ORAL 3 TIMES DAILY PRN
Qty: 30 TABLET | Refills: 0 | Status: SHIPPED | OUTPATIENT
Start: 2022-11-11 | End: 2023-01-05

## 2022-11-11 RX ORDER — ANASTROZOLE 1 MG/1
1 TABLET ORAL DAILY
Qty: 28 TABLET | Refills: 2 | Status: SHIPPED | OUTPATIENT
Start: 2022-11-11 | End: 2023-01-05

## 2022-11-11 RX ORDER — MORPHINE SULFATE 15 MG/1
15 TABLET, FILM COATED, EXTENDED RELEASE ORAL EVERY 12 HOURS
Qty: 60 TABLET | Refills: 0 | Status: CANCELLED | OUTPATIENT
Start: 2022-11-11

## 2022-11-11 RX ORDER — GABAPENTIN 300 MG/1
CAPSULE ORAL
Qty: 180 CAPSULE | Refills: 2 | Status: SHIPPED | OUTPATIENT
Start: 2022-11-11 | End: 2023-12-12

## 2022-11-11 RX ORDER — OXYCODONE HYDROCHLORIDE 5 MG/1
5 TABLET ORAL EVERY 6 HOURS PRN
Qty: 56 TABLET | Refills: 0 | Status: SHIPPED | OUTPATIENT
Start: 2022-11-11 | End: 2022-11-25

## 2022-11-11 RX ORDER — METHYLPREDNISOLONE 32 MG/1
32 TABLET ORAL DAILY
Qty: 1 TABLET | Refills: 0 | Status: SHIPPED | OUTPATIENT
Start: 2022-11-11 | End: 2023-01-05

## 2022-11-11 NOTE — TELEPHONE ENCOUNTER
Medication refill requests for Arimidex, robaxin and Neurontin.    Medications:  See above    Last prescribing provider: DIMITRIOS Yang (Arimidex 8/22/2022, Neurontin 7/14/2022, Robaxin 8/16/2022)    Last clinic visit date: 10/17/2022 Dr Plunkett    Any missed appointments or no-shows since last clinic visit?: No    Recommendations for requested medication (if none, N/A):   - Continue palbociclib and anastrozole    Next clinic visit date: 1/5/2023 DIMITRIOS Yang    Any other pertinent information (if none, N/A): N/A

## 2022-11-11 NOTE — TELEPHONE ENCOUNTER
"Narcotic Refill Request    Medication(s) requested:  MS Contin, oxycodone  Person Requesting Refill: Patient  What pain is the medication treating: Pain  Tylenol PM and pillow under her leg.  How is the medication being taken?  MS Contin scheduled, oxycodone as needed, about 8 pills/day.    Does pt have enough for today? She has enough through Sunday morning.   Is pain being adequately controlled on the current regimen? Yes  Experiencing any side effects from medication?  No    Date of most recent appointment: 10/17/2022  Any No Show Visits: No  Next appointment:   1/5/2023 SHELBIE Yang  Last fill date and by whom:  10/17/2022 Dr Plunkett   Reviewed: No    While on this call re: opioid refill request, she mentions the following:    Left upper anterior leg pain where her leg meets her body (along underwear line per patient).  No redness, rash, swelling or warmth at the site.  No swelling in her leg or foot.  It is a dull, constant pain.  No skin changes that she can see.   This is a new pain in the last 4 days.  She is rating it constantly around a 5, sometimes up to 8/10.  No trauma to the area.  She is on Xarelto 20mg/day and has a history of DVT per review of most recent note.  She denies chest pain or shortness of breath.      She has missed \"one or two\" doses of her Xarelto in the last few weeks.      She will be in today for thyroid imaging.    Paged provider: Dr. Plunkett.    Routed to Dr. Plunkett and RNCC.         "

## 2022-11-11 NOTE — TELEPHONE ENCOUNTER
"Message from Dr. Plunkett received:    \"Patient received 60 tablets of MS contin on 10/17. Prescription is to take 1 tablet BID, this should have lasted her through . We will plan to refill her MS contin on .   Her disease has been stable and the amount of pain medicine she is taking is out of proportion to what would be expected for her disease burden.  I recommend she alternate tylenol 650 mg and ibuprofen 600 mg every 6 hours.  I will refill oxycodone to take a maximum 4 tabs a day for the next two weeks.  We will not refill the medication prior to this and will only fill if she precedes with the recommended CT below.       Recommend CT left lower extremity and ALEKSANDRA visit next week.  She had previous nausea due to contrast.  I have sent prescription for her to take solumedrol 32 mg tablet 12 hours prior to the CT scan.  Finally, she needs to be following with palliative medicine.\"    2:23 pm: Call placed to patient  Left message to please call the triage line at [phone] option 5 option 2 and ask to speak to a triage nurse.    3:31: Call placed to patient:  We review the above message from the MD and she states understanding we review the followin. She has already heard from scheduling about her appt and scan for next week and will keep these appts.  2. She has an appt with Dr. Diaz in palliative care on Tuesday,  and will keep this appt.  3. She states understanding re: meds and reports with this call that she was told to call one week ahead for MS Contin refill - she has been taking as directed and has enough until next week.   4. She has been using Tylenol and Ibuprofen but the above instructions are reviewed with her and she will try to take on more of a scheduled.  5. She will try to limit oxycodone use as outlined above.  6. She will  pre-medication from her pharmacy.  7. In terms of her leg pain, she will continue to monitor and call with worsening.  She will rest, apply " heat, and take meds as above.  Red flags and when to seek urgent/emergent treatment are reviewed with her, including but not limited to worsening pain that becomes intractable, redness, warmth or swelling of her lower extremity, sudden onset chest pain or shortness of breath at rest, or any other concerning symptoms.    She states understanding of the information above and appreciation for the call.     Routed to care team.

## 2022-11-14 ENCOUNTER — INFUSION THERAPY VISIT (OUTPATIENT)
Dept: ONCOLOGY | Facility: CLINIC | Age: 47
End: 2022-11-14
Attending: INTERNAL MEDICINE
Payer: COMMERCIAL

## 2022-11-14 ENCOUNTER — ONCOLOGY VISIT (OUTPATIENT)
Dept: ONCOLOGY | Facility: CLINIC | Age: 47
End: 2022-11-14
Attending: PHYSICIAN ASSISTANT
Payer: COMMERCIAL

## 2022-11-14 ENCOUNTER — ANCILLARY PROCEDURE (OUTPATIENT)
Dept: CT IMAGING | Facility: CLINIC | Age: 47
End: 2022-11-14
Attending: INTERNAL MEDICINE
Payer: COMMERCIAL

## 2022-11-14 ENCOUNTER — APPOINTMENT (OUTPATIENT)
Dept: LAB | Facility: CLINIC | Age: 47
End: 2022-11-14
Attending: INTERNAL MEDICINE
Payer: COMMERCIAL

## 2022-11-14 ENCOUNTER — VIRTUAL VISIT (OUTPATIENT)
Dept: PSYCHIATRY | Facility: CLINIC | Age: 47
End: 2022-11-14
Attending: PSYCHIATRY & NEUROLOGY
Payer: COMMERCIAL

## 2022-11-14 VITALS
OXYGEN SATURATION: 100 % | RESPIRATION RATE: 16 BRPM | SYSTOLIC BLOOD PRESSURE: 148 MMHG | HEART RATE: 60 BPM | BODY MASS INDEX: 34.41 KG/M2 | DIASTOLIC BLOOD PRESSURE: 98 MMHG | WEIGHT: 253.7 LBS | TEMPERATURE: 97.5 F

## 2022-11-14 VITALS
DIASTOLIC BLOOD PRESSURE: 98 MMHG | TEMPERATURE: 98.3 F | HEART RATE: 66 BPM | BODY MASS INDEX: 34.58 KG/M2 | OXYGEN SATURATION: 100 % | RESPIRATION RATE: 16 BRPM | SYSTOLIC BLOOD PRESSURE: 157 MMHG | WEIGHT: 255 LBS

## 2022-11-14 DIAGNOSIS — C50.912 CARCINOMA OF LEFT BREAST METASTATIC TO BONE (H): ICD-10-CM

## 2022-11-14 DIAGNOSIS — Z63.4 BEREAVEMENT: ICD-10-CM

## 2022-11-14 DIAGNOSIS — Z51.11 ENCOUNTER FOR ANTINEOPLASTIC CHEMOTHERAPY: ICD-10-CM

## 2022-11-14 DIAGNOSIS — C79.51 CARCINOMA OF LEFT BREAST METASTATIC TO BONE (H): ICD-10-CM

## 2022-11-14 DIAGNOSIS — M79.652 PAIN OF LEFT THIGH: ICD-10-CM

## 2022-11-14 DIAGNOSIS — C79.51 CARCINOMA OF LEFT BREAST METASTATIC TO BONE (H): Primary | ICD-10-CM

## 2022-11-14 DIAGNOSIS — C50.812 MALIGNANT NEOPLASM OF OVERLAPPING SITES OF LEFT BREAST IN FEMALE, ESTROGEN RECEPTOR POSITIVE (H): ICD-10-CM

## 2022-11-14 DIAGNOSIS — C50.912 CARCINOMA OF LEFT BREAST METASTATIC TO BONE (H): Primary | ICD-10-CM

## 2022-11-14 DIAGNOSIS — F25.0 SCHIZOAFFECTIVE DISORDER, BIPOLAR TYPE (H): Primary | ICD-10-CM

## 2022-11-14 DIAGNOSIS — E04.1 THYROID NODULE: ICD-10-CM

## 2022-11-14 DIAGNOSIS — Z17.0 MALIGNANT NEOPLASM OF OVERLAPPING SITES OF LEFT BREAST IN FEMALE, ESTROGEN RECEPTOR POSITIVE (H): ICD-10-CM

## 2022-11-14 LAB
ALBUMIN SERPL BCG-MCNC: 4 G/DL (ref 3.5–5.2)
ALP SERPL-CCNC: 86 U/L (ref 35–104)
ALT SERPL W P-5'-P-CCNC: 9 U/L (ref 10–35)
ANION GAP SERPL CALCULATED.3IONS-SCNC: 10 MMOL/L (ref 7–15)
AST SERPL W P-5'-P-CCNC: 13 U/L (ref 10–35)
BASOPHILS # BLD AUTO: 0.1 10E3/UL (ref 0–0.2)
BASOPHILS NFR BLD AUTO: 2 %
BILIRUB SERPL-MCNC: <0.2 MG/DL
BUN SERPL-MCNC: 10 MG/DL (ref 6–20)
CALCIUM SERPL-MCNC: 9 MG/DL (ref 8.6–10)
CALCIUM SERPL-MCNC: 9 MG/DL (ref 8.6–10)
CANCER AG15-3 SERPL-ACNC: 38 U/ML
CEA SERPL-MCNC: 3 NG/ML
CHLORIDE SERPL-SCNC: 107 MMOL/L (ref 98–107)
CREAT SERPL-MCNC: 0.83 MG/DL (ref 0.51–0.95)
DEPRECATED HCO3 PLAS-SCNC: 24 MMOL/L (ref 22–29)
EOSINOPHIL # BLD AUTO: 0 10E3/UL (ref 0–0.7)
EOSINOPHIL NFR BLD AUTO: 1 %
ERYTHROCYTE [DISTWIDTH] IN BLOOD BY AUTOMATED COUNT: 14.4 % (ref 10–15)
GFR SERPL CREATININE-BSD FRML MDRD: 87 ML/MIN/1.73M2
GLUCOSE SERPL-MCNC: 91 MG/DL (ref 70–99)
HCT VFR BLD AUTO: 35.2 % (ref 35–47)
HGB BLD-MCNC: 11.6 G/DL (ref 11.7–15.7)
IMM GRANULOCYTES # BLD: 0 10E3/UL
IMM GRANULOCYTES NFR BLD: 0 %
LYMPHOCYTES # BLD AUTO: 1.3 10E3/UL (ref 0.8–5.3)
LYMPHOCYTES NFR BLD AUTO: 42 %
MCH RBC QN AUTO: 33 PG (ref 26.5–33)
MCHC RBC AUTO-ENTMCNC: 33 G/DL (ref 31.5–36.5)
MCV RBC AUTO: 100 FL (ref 78–100)
MONOCYTES # BLD AUTO: 0.2 10E3/UL (ref 0–1.3)
MONOCYTES NFR BLD AUTO: 7 %
NEUTROPHILS # BLD AUTO: 1.5 10E3/UL (ref 1.6–8.3)
NEUTROPHILS NFR BLD AUTO: 48 %
NRBC # BLD AUTO: 0 10E3/UL
NRBC BLD AUTO-RTO: 0 /100
PLATELET # BLD AUTO: 222 10E3/UL (ref 150–450)
POTASSIUM SERPL-SCNC: 3.6 MMOL/L (ref 3.4–5.3)
PROT SERPL-MCNC: 7.1 G/DL (ref 6.4–8.3)
RADIOLOGIST FLAGS: NORMAL
RBC # BLD AUTO: 3.51 10E6/UL (ref 3.8–5.2)
SODIUM SERPL-SCNC: 141 MMOL/L (ref 136–145)
WBC # BLD AUTO: 3.2 10E3/UL (ref 4–11)

## 2022-11-14 PROCEDURE — 86300 IMMUNOASSAY TUMOR CA 15-3: CPT

## 2022-11-14 PROCEDURE — 96374 THER/PROPH/DIAG INJ IV PUSH: CPT

## 2022-11-14 PROCEDURE — 85025 COMPLETE CBC W/AUTO DIFF WBC: CPT

## 2022-11-14 PROCEDURE — 99214 OFFICE O/P EST MOD 30 MIN: CPT | Mod: GC | Performed by: STUDENT IN AN ORGANIZED HEALTH CARE EDUCATION/TRAINING PROGRAM

## 2022-11-14 PROCEDURE — 80053 COMPREHEN METABOLIC PANEL: CPT

## 2022-11-14 PROCEDURE — 99215 OFFICE O/P EST HI 40 MIN: CPT | Performed by: PHYSICIAN ASSISTANT

## 2022-11-14 PROCEDURE — 82310 ASSAY OF CALCIUM: CPT | Performed by: INTERNAL MEDICINE

## 2022-11-14 PROCEDURE — 82378 CARCINOEMBRYONIC ANTIGEN: CPT

## 2022-11-14 PROCEDURE — 36415 COLL VENOUS BLD VENIPUNCTURE: CPT

## 2022-11-14 PROCEDURE — 258N000003 HC RX IP 258 OP 636: Performed by: INTERNAL MEDICINE

## 2022-11-14 PROCEDURE — G0463 HOSPITAL OUTPT CLINIC VISIT: HCPCS

## 2022-11-14 PROCEDURE — 250N000011 HC RX IP 250 OP 636: Performed by: INTERNAL MEDICINE

## 2022-11-14 PROCEDURE — 73701 CT LOWER EXTREMITY W/DYE: CPT | Mod: LT | Performed by: RADIOLOGY

## 2022-11-14 RX ORDER — QUETIAPINE FUMARATE 50 MG/1
200 TABLET, FILM COATED ORAL AT BEDTIME
Qty: 120 TABLET | Refills: 1 | Status: SHIPPED | OUTPATIENT
Start: 2022-11-14 | End: 2022-12-05

## 2022-11-14 RX ORDER — ZOLEDRONIC ACID 0.04 MG/ML
4 INJECTION, SOLUTION INTRAVENOUS ONCE
Status: COMPLETED | OUTPATIENT
Start: 2022-11-14 | End: 2022-11-14

## 2022-11-14 RX ORDER — IOPAMIDOL 755 MG/ML
122 INJECTION, SOLUTION INTRAVASCULAR ONCE
Status: COMPLETED | OUTPATIENT
Start: 2022-11-14 | End: 2022-11-14

## 2022-11-14 RX ORDER — SERTRALINE HYDROCHLORIDE 100 MG/1
TABLET, FILM COATED ORAL
Qty: 30 TABLET | Refills: 1 | Status: SHIPPED | OUTPATIENT
Start: 2022-11-14 | End: 2022-12-05

## 2022-11-14 RX ADMIN — IOPAMIDOL 122 ML: 755 INJECTION, SOLUTION INTRAVASCULAR at 08:11

## 2022-11-14 RX ADMIN — ZOLEDRONIC ACID 4 MG: 0.04 INJECTION, SOLUTION INTRAVENOUS at 10:45

## 2022-11-14 RX ADMIN — SODIUM CHLORIDE 250 ML: 9 INJECTION, SOLUTION INTRAVENOUS at 10:35

## 2022-11-14 SDOH — SOCIAL STABILITY - SOCIAL INSECURITY: DISSAPEARANCE AND DEATH OF FAMILY MEMBER: Z63.4

## 2022-11-14 ASSESSMENT — PAIN SCALES - GENERAL
PAINLEVEL: SEVERE PAIN (7)
PAINLEVEL: SEVERE PAIN (7)

## 2022-11-14 NOTE — NURSING NOTE
Oncology Rooming Note    November 14, 2022 8:57 AM   Teresa Sanderson is a 47 year old female who presents for:    Chief Complaint   Patient presents with     Oncology Clinic Visit     L Breast Cancer     Initial Vitals: BP (!) 157/98 (BP Location: Right arm, Patient Position: Sitting, Cuff Size: Adult Large)   Pulse 66   Temp 98.3  F (36.8  C) (Oral)   Resp 16   Wt 115.7 kg (255 lb)   SpO2 100%   BMI 34.58 kg/m   Estimated body mass index is 34.58 kg/m  as calculated from the following:    Height as of 10/15/22: 1.829 m (6').    Weight as of this encounter: 115.7 kg (255 lb). Body surface area is 2.42 meters squared.  Severe Pain (7) Comment: Data Unavailable   No LMP recorded. (Menstrual status: Tubal ).  Allergies reviewed: Yes  Medications reviewed: Yes    Medications: Pt is requesting a refill of Xarelto and Armidex  Pharmacy name entered into EPIC:    [facility] - [place], [place] - [address] [place] [address] [facility]  [id number] - [place], [place] - [address] AT [facility] [address]  [facility] - [place], [place] - [address] [facility]  [facility]IV[facility] - [place], [place] - [facility] [address]  [facility] - [place], [place] - [address] [id number] - [place], [place] - [address]    Clinical concerns: Pt presents today for f/u and imaging review.       Serenity San LPN  11/14/2022

## 2022-11-14 NOTE — PROGRESS NOTES
Oncology Visit:   Date on this visit: Nov 14, 2022    Diagnosis:  ER positive left breast cancer metastasized to bones.     Primary Physician: No Ref-Primary, Physician     History Of Present Illness:    Ms. Sanderson is a 47 year old female with a h/o tobacco abuse and DVTs with left breast cancer metastasized to bone. She presented to [facility] ED with back pain on 12/5/2020. MRI of the L-spine showed an abnormal L4 lesion with associated right paraspinal mass, abnormalities in L5 and the left iliac bone were also seen. CT C/A/P showed a left breast mass, lytic lesions of T7, L4, and the pelvis, and a 3 cm lesion in the kidney (thought to be a cyst). Ultrasound of the bilateral lower extremities showed a non-occlusive thrombus in the left popliteal vein. Mammogram and ultrasound of the bilateral breasts on 12/17/2020 showed a spiculated mass measuring at least 7.8 cm at 12-1:00 left breast extending from the nipple to 9 cm from the nipple with associated nipple retraction. This mass was biopsied, and showed IDC with surrounding DCIS, grade 3, ER+ 90%, and ME+ 75%.  HER2 was equivocal in approximately 35% of tumor cells by FISH and was negative by IHC.    Metastatic Breast Cancer Treatment:  12/23/2021 - 1/7/2021  Radiation (3000 cGy) to the lumbar spine.  1/29/2021 - present  Ibrance, zoladex, and anastrozole.  5/17/2021 radiofrequency ablation, kyphoplasty to L4  8/20/2021  Bilateral salpingo-oophorectomies, Ibrance and anastrozole.  She was off anastrozole 12/2021 - 2/2022 and off Ibrance 01/2022 - 02/2022 due to stress and impending homelessness. Off both again 03/2022-04/2022 due to death of her son but restarted in 05/2022.    Interval History:  Teresa comes in today for evaluation of L leg pain. She notes this started recently last Monday. Prior to this she had been in bed for over a  week. Her depression and grief has been bad lately. Last Monday she started to get more motivation and be more active and  noticed intermittent pain along anterior upper L leg--groin and panty line area. She notices this occurs with certain positions only--mostly with abduction. Sometimes with weight bearing, sometimes not. Her back pain continues but has been stable. No acute n/v, weakness. No edema, bruising, or trauma.     She has follow-up with psychiatry later today. Her son from [place] is now living with her and is going to be her PCA. She is still working on moving to her own apartment.     Cough has completely resolved now.      Past Medical/Surgical History:   Past Medical History:   Diagnosis Date     Anxiety      Breast CA (H) 12/2020     Depression      DVT (deep venous thrombosis) (H) 2014     Left breast mass     x approximately 4-5 months     Pulmonary emboli (H)      Pyelonephritis      Schizoaffective disorder (H)      Tobacco use      Past Surgical History:   Procedure Laterality Date     COLONOSCOPY N/A 7/8/2022    Procedure: COLONOSCOPY, WITH POLYPECTOMY;  Surgeon: Ham Cano MD;  Location: [facility] OR     IR LUMBAR KYPHOPLASTY VERTEBRAE  5/17/2021     LAPAROSCOPIC SALPINGO-OOPHORECTOMY Bilateral 8/20/2021    Procedure: BILATERAL SALPINGO-OOPHORECTOMY, LAPAROSCOPIC;  Surgeon: Rory Lopez MD;  Location: [facility] OR     TUBAL LIGATION  1998        Allergies   Allergen Reactions     Contrast Dye      Pt developed nausea after isovue 370 injection on 6/9/21        Current Outpatient Medications   Medication     anastrozole (ARIMIDEX) 1 MG tablet     Benzocaine-Menthol (CEPACOL) 15-2.3 MG LOZG     gabapentin (NEURONTIN) 300 MG capsule     guaiFENesin-codeine (GUAIFENESIN AC) 100-10 MG/5ML syrup     methocarbamol (ROBAXIN) 500 MG tablet     methylPREDNISolone (MEDROL) 32 MG tablet     morphine (MS CONTIN) 15 MG CR tablet     nicotine (COMMIT) 2 MG lozenge     nicotine (NICORETTE) 2 MG gum     oxyCODONE (ROXICODONE) 5 MG tablet     palbociclib (IBRANCE) 125 MG tablet     pantoprazole (PROTONIX) 40 MG EC tablet      polyethylene glycol (MIRALAX) 17 GM/Dose powder     QUEtiapine (SEROQUEL) 50 MG tablet     rivaroxaban ANTICOAGULANT (XARELTO) 20 MG TABS tablet     SENNA-docusate sodium (SENNA S) 8.6-50 MG tablet     sennosides (SENOKOT) 8.6 MG tablet     sertraline (ZOLOFT) 100 MG tablet     sertraline (ZOLOFT) 50 MG tablet     tolnaftate (TINACTIN) 1 % external cream     Vitamin D3 (CHOLECALCIFEROL) 25 mcg (1000 units) tablet     naloxone (NARCAN) 4 MG/0.1ML nasal spray     No current facility-administered medications for this visit.     Facility-Administered Medications Ordered in Other Visits   Medication     zoledronic acid (ZOMETA) intermittent infusion 4 mg   '  Physical Exam:   BP (!) 157/98 (BP Location: Right arm, Patient Position: Sitting, Cuff Size: Adult Large)   Pulse 66   Temp 98.3  F (36.8  C) (Oral)   Resp 16   Wt 115.7 kg (255 lb)   SpO2 100%   BMI 34.58 kg/m    General:  Very pleasant. Alert and oriented x 3.  HEENT:  Normocephalic.  Sclera anicteric. Enlarged thyroid on exam.  MMM.  Lymph:  No palpable cervical, supraclavicular, inguinal, or axillary LAD.  Chest:  CTA bilaterally.    CV:  RRR.   Abd:  Soft/NT/ND. + BS   Ext:  No edema of the bilateral lower extremities.    Musculo: No pain with palpation of L upper or inner thigh. No palpable lateral or posterior hip pain.   Neuro:  Cranial nerves grossly intact. Strength intact 5/5 LLE and RLE. Patellar reflexes intact.   Psych:  Mood and affect appear normal.    Laboratory/Imaging Studies:    11/14/22 09:47   Sodium 141   Potassium 3.6   Chloride 107   Carbon Dioxide (CO2) 24   Urea Nitrogen 10.0   Creatinine 0.83   GFR Estimate 87   Calcium 9.0  9.0   Anion Gap 10   Albumin 4.0   Protein Total 7.1   Alkaline Phosphatase 86   ALT 9 (L)   AST 13   Bilirubin Total <0.2   Glucose 91   WBC 3.2 (L)   Hemoglobin 11.6 (L)   Hematocrit 35.2   Platelet Count 222   RBC Count 3.51 (L)      MCH 33.0   MCHC 33.0   RDW 14.4   % Neutrophils 48   %  Lymphocytes 42   % Monocytes 7   % Eosinophils 1   % Basophils 2   Absolute Basophils 0.1   Absolute Eosinophils 0.0   Absolute Immature Granulocytes 0.0   Absolute Lymphocytes 1.3   Absolute Monocytes 0.2   % Immature Granulocytes 0   Absolute Neutrophils 1.5 (L)   Absolute NRBCs 0.0   NRBCs per 100 WBC 0     US Thyroid 11/14  Impression:  Mildly enlarged thyroid.  1.  Nodule #3 on the left correlates with nodule described on  8/12/2022.  Consider fine needle aspiration given associated focal FDG  uptake.  2.  Additional bilateral nodules as above.  Follow-up in 1 year.     [Consider Follow Up: Nodule #3]    CT Femur L  FINDINGS:       images: Unremarkable.     No acute osseous abnormality. Sclerotic foci in the left inferior  pubic ramus/ischium on series 6 image 138, similar recent PET/CT.  Peripherally sclerotic lesion in the ischial tuberosity on series 6  image 155, stable. Sclerotic focus in the left greater trochanter on  axial series 2 image 142, similar prior. Generalized patchy sclerosis  of the anterior acetabulum, similar to prior PET/CT.     Tricompartmental degenerative change of the left knee. Mild left hip  degenerative change.     Visualized vasculature is normal. Trace left knee effusion. No  substantial free fluid in the visualized pelvis. The bladder is  partially distended.                                                                       IMPRESSION: Bony sclerotic foci appear similar to PET/CT 8/12/2022.  MRI is more sensitive for the evaluation of marrow infiltration,  however.      ASSESSMENT/PLAN:   47 year old female with history of DVT and ER positive left breast cancer metastasized to bones.    1.  Metastatic breast cancer: Most recent PET/CT on 8/12/2022 shows ongoing response to treatment.  - Continue palbociclib and anastrozole  - Continue monthly follow-up and labs including tumor markers.   - At time of progression would consider next line treatment with abemaciclib and  fulvestrant.    2.  Acute onset L anterior upper leg pain: Exam is benign. CT femur done today showing stable sclerotic lesions, no fx. With history, likely MSK strain. Discussed RICE. She is on AC so need to avoid NSAIDs. Taking tylenol appropriately. Has muscle relaxant. Discussed using ice.     3.  Schizoaffective disorder, bipolar type: She is on treatment with Zoloft and Zyprexa.  Ongoing follow up with psychiatry. Depressive symptoms have been worse lately. Has psychiatry follow-up this afternoon.     4.  Bone metastases/back pain:  She is s/p XRT to the lumbar spine and kyphoplasty of L4.  She is taking MS contin 15 mg PO BID and oxycodone and tylenol PRN.  She also takes methocarbamol prn muscle spasms.   - We are going to try to wean opioids due to excellent response to cancer therapy. Pain has seemed out of proportion to cancer burden however she has had multiple acute events occur which have slowed down weaning process.   -Has Dr. Joe amaral tomorrow who may have some thoughts on best approach to this.   - On Zometa since 1/18/2021 and is receiving once every 3 months. - Will give next dose today.     4.  DVT:  She confirms that she has continued on Xarelto.  Recommend continuing Xarelto until contraindicated given metastatic disease.      5.  Enlarged thyroid: Thyroid tests WNL last month. PET in 08/2022 showed a metabolic focus in L lobe. US thyroid done last week showing TIRADS 3 lesion in left superior lobe. Ordered thyroid FNA to be done.     Greater than 40 minutes was spent with this patient with greater than 20 minutes spent in counseling and coordination of care.    Alee Yang PA-C

## 2022-11-14 NOTE — PROGRESS NOTES
Infusion Nursing Note:  Teresa Sanderson presents today for Cycle 7 Day 1 zometa.    Patient seen by provider today: Yes: Alee Yang PA-C   present during visit today: Not Applicable.    Note: Teresa presents today feeling overall well. Denies pain or nausea/vomiting. Offers no concerns since visit with Alee Yang prior to infusion.    Intravenous Access:  Peripheral IV placed.    Treatment Conditions:     Latest Reference Range & Units 11/14/22 09:47   Sodium 136 - 145 mmol/L 141   Potassium 3.4 - 5.3 mmol/L 3.6   Chloride 98 - 107 mmol/L 107   Carbon Dioxide (CO2) 22 - 29 mmol/L 24   Urea Nitrogen 6.0 - 20.0 mg/dL 10.0   Creatinine 0.51 - 0.95 mg/dL 0.83   GFR Estimate >60 mL/min/1.73m2 87   Calcium 8.6 - 10.0 mg/dL 9.0   Anion Gap 7 - 15 mmol/L 10   Albumin 3.5 - 5.2 g/dL 4.0   Protein Total 6.4 - 8.3 g/dL 7.1   Alkaline Phosphatase 35 - 104 U/L 86   ALT 10 - 35 U/L 9 (L)   AST 10 - 35 U/L 13   Bilirubin Total <=1.2 mg/dL <0.2   Glucose 70 - 99 mg/dL 91   WBC 4.0 - 11.0 10e3/uL 3.2 (L)   Hemoglobin 11.7 - 15.7 g/dL 11.6 (L)   Hematocrit 35.0 - 47.0 % 35.2   Platelet Count 150 - 450 10e3/uL 222   RBC Count 3.80 - 5.20 10e6/uL 3.51 (L)   MCV 78 - 100 fL 100   MCH 26.5 - 33.0 pg 33.0   MCHC 31.5 - 36.5 g/dL 33.0   RDW 10.0 - 15.0 % 14.4   % Neutrophils % 48   % Lymphocytes % 42   % Monocytes % 7   % Eosinophils % 1   % Basophils % 2   Absolute Basophils 0.0 - 0.2 10e3/uL 0.1   Absolute Eosinophils 0.0 - 0.7 10e3/uL 0.0   Absolute Immature Granulocytes <=0.4 10e3/uL 0.0   Absolute Lymphocytes 0.8 - 5.3 10e3/uL 1.3   Absolute Monocytes 0.0 - 1.3 10e3/uL 0.2   % Immature Granulocytes % 0   Absolute Neutrophils 1.6 - 8.3 10e3/uL 1.5 (L)   Absolute NRBCs 10e3/uL 0.0   NRBCs per 100 WBC <1 /100 0     Results reviewed, labs MET treatment parameters, ok to proceed with treatment.    Post Infusion Assessment:  Patient tolerated infusion without incident.  Blood return noted pre and post  infusion.  Site patent and intact, free from redness, edema or discomfort.  No evidence of extravasations.  Access discontinued per protocol.     Discharge Plan:   Patient declined prescription refills.  Discharge instructions reviewed with: Patient.  Patient and/or family verbalized understanding of discharge instructions and all questions answered.  AVS to patient via [employer].  Patient will return 01/05/23 for next appointment with Alee Yang.   Patient discharged in stable condition accompanied by: self.  Departure Mode: Ambulatory.      Julia Salazar RN

## 2022-11-14 NOTE — PATIENT INSTRUCTIONS
[facility] Triage and after hours / weekends / holidays:  [phone]    Please call the triage or after hours line if you experience a temperature greater than or equal to 100.4, shaking chills, have uncontrolled nausea, vomiting and/or diarrhea, dizziness, shortness of breath, chest pain, bleeding, unexplained bruising, or if you have any other new/concerning symptoms, questions or concerns.      If you are having any concerning symptoms or wish to speak to a provider before your next infusion visit, please call your care coordinator or triage to notify them so we can adequately serve you.     If you need a refill on a narcotic prescription or other medication, please call before your infusion appointment.

## 2022-11-14 NOTE — LETTER
11/14/2022         RE: Teresa Sanderson  [address] [facility], [place] [address]      Oncology Visit:   Date on this visit: Nov 14, 2022    Diagnosis:  ER positive left breast cancer metastasized to bones.     Primary Physician: No Ref-Primary, Physician     History Of Present Illness:    Ms. Sanderson is a 47 year old female with a h/o tobacco abuse and DVTs with left breast cancer metastasized to bone. She presented to [place] ED with back pain on 12/5/2020. MRI of the L-spine showed an abnormal L4 lesion with associated right paraspinal mass, abnormalities in L5 and the left iliac bone were also seen. CT C/A/P showed a left breast mass, lytic lesions of T7, L4, and the pelvis, and a 3 cm lesion in the kidney (thought to be a cyst). Ultrasound of the bilateral lower extremities showed a non-occlusive thrombus in the left popliteal vein. Mammogram and ultrasound of the bilateral breasts on 12/17/2020 showed a spiculated mass measuring at least 7.8 cm at 12-1:00 left breast extending from the nipple to 9 cm from the nipple with associated nipple retraction. This mass was biopsied, and showed IDC with surrounding DCIS, grade 3, ER+ 90%, and OH+ 75%.  HER2 was equivocal in approximately 35% of tumor cells by FISH and was negative by IHC.    Metastatic Breast Cancer Treatment:  12/23/2021 - 1/7/2021  Radiation (3000 cGy) to the lumbar spine.  1/29/2021 - present  Ibrance, zoladex, and anastrozole.  5/17/2021 radiofrequency ablation, kyphoplasty to L4  8/20/2021  Bilateral salpingo-oophorectomies, Ibrance and anastrozole.  She was off anastrozole 12/2021 - 2/2022 and off Ibrance 01/2022 - 02/2022 due to stress and impending homelessness. Off both again 03/2022-04/2022 due to death of her son but restarted in 05/2022.    Interval History:  Teresa comes in today for evaluation of L leg pain. She notes this started recently last Monday. Prior to this she had been in bed for over a  week. Her depression and grief  has been bad lately. Last Monday she started to get more motivation and be more active and noticed intermittent pain along anterior upper L leg--groin and panty line area. She notices this occurs with certain positions only--mostly with abduction. Sometimes with weight bearing, sometimes not. Her back pain continues but has been stable. No acute n/v, weakness. No edema, bruising, or trauma.     She has follow-up with psychiatry later today. Her son from [place] is now living with her and is going to be her PCA. She is still working on moving to her own apartment.     Cough has completely resolved now.      Past Medical/Surgical History:   Past Medical History:   Diagnosis Date     Anxiety      Breast CA (H) 12/2020     Depression      DVT (deep venous thrombosis) (H) 2014     Left breast mass     x approximately 4-5 months     Pulmonary emboli (H)      Pyelonephritis      Schizoaffective disorder (H)      Tobacco use      Past Surgical History:   Procedure Laterality Date     COLONOSCOPY N/A 7/8/2022    Procedure: COLONOSCOPY, WITH POLYPECTOMY;  Surgeon: Ham Cano MD;  Location: [facility] OR     IR LUMBAR KYPHOPLASTY VERTEBRAE  5/17/2021     LAPAROSCOPIC SALPINGO-OOPHORECTOMY Bilateral 8/20/2021    Procedure: BILATERAL SALPINGO-OOPHORECTOMY, LAPAROSCOPIC;  Surgeon: Rory Lopez MD;  Location: [facility] OR     TUBAL LIGATION  1998        Allergies   Allergen Reactions     Contrast Dye      Pt developed nausea after isovue 370 injection on 6/9/21        Current Outpatient Medications   Medication     anastrozole (ARIMIDEX) 1 MG tablet     Benzocaine-Menthol (CEPACOL) 15-2.3 MG LOZG     gabapentin (NEURONTIN) 300 MG capsule     guaiFENesin-codeine (GUAIFENESIN AC) 100-10 MG/5ML syrup     methocarbamol (ROBAXIN) 500 MG tablet     methylPREDNISolone (MEDROL) 32 MG tablet     morphine (MS CONTIN) 15 MG CR tablet     nicotine (COMMIT) 2 MG lozenge     nicotine (NICORETTE) 2 MG gum     oxyCODONE (ROXICODONE) 5 MG  tablet     palbociclib (IBRANCE) 125 MG tablet     pantoprazole (PROTONIX) 40 MG EC tablet     polyethylene glycol (MIRALAX) 17 GM/Dose powder     QUEtiapine (SEROQUEL) 50 MG tablet     rivaroxaban ANTICOAGULANT (XARELTO) 20 MG TABS tablet     SENNA-docusate sodium (SENNA S) 8.6-50 MG tablet     sennosides (SENOKOT) 8.6 MG tablet     sertraline (ZOLOFT) 100 MG tablet     sertraline (ZOLOFT) 50 MG tablet     tolnaftate (TINACTIN) 1 % external cream     Vitamin D3 (CHOLECALCIFEROL) 25 mcg (1000 units) tablet     naloxone (NARCAN) 4 MG/0.1ML nasal spray     No current facility-administered medications for this visit.     Facility-Administered Medications Ordered in Other Visits   Medication     zoledronic acid (ZOMETA) intermittent infusion 4 mg   '  Physical Exam:   BP (!) 157/98 (BP Location: Right arm, Patient Position: Sitting, Cuff Size: Adult Large)   Pulse 66   Temp 98.3  F (36.8  C) (Oral)   Resp 16   Wt 115.7 kg (255 lb)   SpO2 100%   BMI 34.58 kg/m    General:  Very pleasant. Alert and oriented x 3.  HEENT:  Normocephalic.  Sclera anicteric. Enlarged thyroid on exam.  MMM.  Lymph:  No palpable cervical, supraclavicular, inguinal, or axillary LAD.  Chest:  CTA bilaterally.    CV:  RRR.   Abd:  Soft/NT/ND. + BS   Ext:  No edema of the bilateral lower extremities.    Musculo: No pain with palpation of L upper or inner thigh. No palpable lateral or posterior hip pain.   Neuro:  Cranial nerves grossly intact. Strength intact 5/5 LLE and RLE. Patellar reflexes intact.   Psych:  Mood and affect appear normal.    Laboratory/Imaging Studies:    11/14/22 09:47   Sodium 141   Potassium 3.6   Chloride 107   Carbon Dioxide (CO2) 24   Urea Nitrogen 10.0   Creatinine 0.83   GFR Estimate 87   Calcium 9.0  9.0   Anion Gap 10   Albumin 4.0   Protein Total 7.1   Alkaline Phosphatase 86   ALT 9 (L)   AST 13   Bilirubin Total <0.2   Glucose 91   WBC 3.2 (L)   Hemoglobin 11.6 (L)   Hematocrit 35.2   Platelet Count 222   RBC  Count 3.51 (L)      MCH 33.0   MCHC 33.0   RDW 14.4   % Neutrophils 48   % Lymphocytes 42   % Monocytes 7   % Eosinophils 1   % Basophils 2   Absolute Basophils 0.1   Absolute Eosinophils 0.0   Absolute Immature Granulocytes 0.0   Absolute Lymphocytes 1.3   Absolute Monocytes 0.2   % Immature Granulocytes 0   Absolute Neutrophils 1.5 (L)   Absolute NRBCs 0.0   NRBCs per 100 WBC 0     US Thyroid 11/14  Impression:  Mildly enlarged thyroid.  1.  Nodule #3 on the left correlates with nodule described on  8/12/2022.  Consider fine needle aspiration given associated focal FDG  uptake.  2.  Additional bilateral nodules as above.  Follow-up in 1 year.     [Consider Follow Up: Nodule #3]    CT Femur L  FINDINGS:       images: Unremarkable.     No acute osseous abnormality. Sclerotic foci in the left inferior  pubic ramus/ischium on series 6 image 138, similar recent PET/CT.  Peripherally sclerotic lesion in the ischial tuberosity on series 6  image 155, stable. Sclerotic focus in the left greater trochanter on  axial series 2 image 142, similar prior. Generalized patchy sclerosis  of the anterior acetabulum, similar to prior PET/CT.     Tricompartmental degenerative change of the left knee. Mild left hip  degenerative change.     Visualized vasculature is normal. Trace left knee effusion. No  substantial free fluid in the visualized pelvis. The bladder is  partially distended.                                                                       IMPRESSION: Bony sclerotic foci appear similar to PET/CT 8/12/2022.  MRI is more sensitive for the evaluation of marrow infiltration,  however.      ASSESSMENT/PLAN:   47 year old female with history of DVT and ER positive left breast cancer metastasized to bones.    1.  Metastatic breast cancer: Most recent PET/CT on 8/12/2022 shows ongoing response to treatment.  - Continue palbociclib and anastrozole  - Continue monthly follow-up and labs including tumor markers.   -  At time of progression would consider next line treatment with abemaciclib and fulvestrant.    2.  Acute onset L anterior upper leg pain: Exam is benign. CT femur done today showing stable sclerotic lesions, no fx. With history, likely MSK strain. Discussed RICE. She is on AC so need to avoid NSAIDs. Taking tylenol appropriately. Has muscle relaxant. Discussed using ice.     3.  Schizoaffective disorder, bipolar type: She is on treatment with Zoloft and Zyprexa.  Ongoing follow up with psychiatry. Depressive symptoms have been worse lately. Has psychiatry follow-up this afternoon.     4.  Bone metastases/back pain:  She is s/p XRT to the lumbar spine and kyphoplasty of L4.  She is taking MS contin 15 mg PO BID and oxycodone and tylenol PRN.  She also takes methocarbamol prn muscle spasms.   - We are going to try to wean opioids due to excellent response to cancer therapy. Pain has seemed out of proportion to cancer burden however she has had multiple acute events occur which have slowed down weaning process.   -Has Dr. Joe amaral tomorrow who may have some thoughts on best approach to this.   - On Zometa since 1/18/2021 and is receiving once every 3 months. - Will give next dose today.     4.  DVT:  She confirms that she has continued on Xarelto.  Recommend continuing Xarelto until contraindicated given metastatic disease.      5.  Enlarged thyroid: Thyroid tests WNL last month. PET in 08/2022 showed a metabolic focus in L lobe. US thyroid done last week showing TIRADS 3 lesion in left superior lobe. Ordered thyroid FNA to be done.     Greater than 40 minutes was spent with this patient with greater than 20 minutes spent in counseling and coordination of care.    RICHARD Kim PA-C

## 2022-11-14 NOTE — NURSING NOTE
Chief Complaint   Patient presents with     Blood Draw     Vital, blood drawn off piv was already in place. Pt has checked in for the next appt          Asuncion zhang/ FLOR Valdovinos LPN

## 2022-11-14 NOTE — PATIENT INSTRUCTIONS
**For crisis resources, please see the information at the end of this document**   Patient Education    Thank you for coming to the [facility] [place] [facility].     Lab Testing:  If you had lab testing today and your results are reassuring or normal they will be mailed to you or sent through [employer] within 7 days. If the lab tests need quick action we will call you with the results. The phone number we will call with results is # [phone]. If this is not the best number please call our clinic and change the number.     Medication Refills:  If you need any refills please call your pharmacy and they will contact us. Our fax number for refills is [phone].   Three business days of notice are needed for general medication refill requests.   Five business days of notice are needed for controlled substance refill requests.   If you need to change to a different pharmacy, please contact the new pharmacy directly. The new pharmacy will help you get your medications transferred.     Contact Us:  Please call [phone] during business hours (8-5:00 M-F).   If you have medication related questions after clinic hours, or on the weekend, please call [phone].     Financial Assistance [phone]   Medical Records [phone]       MENTAL HEALTH CRISIS RESOURCES:  For a emergency help, please call 911 or go to the nearest Emergency Department.     Emergency Walk-In Options:   [facility] Unit @ [facility] ([place]): [phone] - Specialized mental health emergency area designed to be calming  [facility] ([place]): [phone]  [facility] ([place]): [phone]  [facility]): [phone]    [facility] Crisis Information:   Rescue: [phone]  [place]: [phone]  [place] ([place]) - Adult: [phone]     Child: [phone]  [place] - Adult: [phone]     Child: [phone]  [place]:  [phone]  List of all [facility] resources:   https://[place].gov/dhs/people-we-serve/adults/health-care/mental-health/resources/crisis-contacts.jsp    National Crisis Information:   Crisis Text Line: Text  [place]  to 378097  Suicide & Crisis Lifeline: 988  National Suicide Prevention Lifeline: 3-927-406-TALK (1-608.752.4117)       For online chat options, visit https://suicidepreventionlifeline.org/chat/  Poison Control Center: 2-572-100-5225  Trans Lifeline: 0-840-845-2187 - Hotline for transgender people of all ages  [employer]: 6-711-475-5020 - Hotline for LGBT youth     For Non-Emergency Support:   Fast Tracker: Mental Health & Substance Use Disorder Resources -   https://www.[employer].org/

## 2022-11-14 NOTE — PROGRESS NOTES
Teresa Sanderson is a 47 year old who is being evaluated via a billable video visit.      Pt will join video visit via: [email or web address]  If there are problems joining the visit, send backup video invite via: Text to preferred phone: [phone]    Reason for telehealth visit: Patient has requested telehealth visit    Originating location (patient location): In the car parked    Will anyone else be joining the visit? No

## 2022-11-14 NOTE — PROGRESS NOTES
VIDEO VISIT  Teresa Sanderson is a 47 year old patient who is being evaluated via a billable video visit.      How would you like to obtain your AVS?: [employer]  AVS SmartPhrase [PsychAVS] has been placed in 'Patient Instructions': Yes      Video- Visit Details  Type of service:  video visit for medication management  Time of service:    Start Time:  2:31 PM    End Time:  3:15 PM    Originating Site (patient location):  State- [place]   Location- Friend or family home  Distant Site (provider location):  Remote location  Mode of Communication:  Video Conference via [facility]  Psychiatry Clinic  MEDICAL PROGRESS NOTE     CARE TEAM:  PCP- Physician No Ref-Primary    Psychotherapist- None   Teresa is a 47 year old who uses the name Teresa and pronouns she, her, hers.      DIAGNOSIS   Schizoaffective disorder, bipolar type (previously diagnosed with bipolar disorder)  Bereavement  Metastatic breast cancer     ASSESSMENT   Teresa states she is doing okay overall. She is having a difficult time falling asleep and staying asleep. We will increase her Seroquel to target her sleep and mood. We discussed the sedation risk while taking this medication with her other pain medications. We will consider increasing her Zoloft carefully in the future, as we do not want to put her at risk for an elevated mood state.     She is working with [employer] () to secure housing and has placed a deposit on an apartment and is waiting for her emergency assistance paperwork to process. The paper work was sent last Monday and she was encouraged to call and follow-up on her application.     She requested the phone number to schedule a therapy intake appointment to be sent to her through [employer]. She was also reminded to complete her lipid and hgb A1C labs. Future consideration includes support services for smoking cessation.     [place]:  Indicates taking controlled medication as  "prescribed.     PLAN                                                                                                                1) Meds-  Increase: Seroquel to 200 mg at bedtime  Continue: Zoloft 150 mg daily     Prescribed by Heme Onc:  oxyCODONE (ROXICODONE) 5 MG tablet - 5-10 mg every 6 hours as needed for moderate to severe pain  morphine (MS CONTIN) 15 MG CR tablet - 15 mg by mouth every 12 hours  Gabapentin 600 mg in the morning, 600 mg at 2 pm and 600 mg at bedtime  Robaxin 500 mg TID PRN for muscle spasms     2) Psychotherapy- Referral placed; Teresa given phone number through [employer] and states she will call to schedule      3) Next due-  Labs- SGA labs: lipid and Hgb A1C previously ordered and reminded to get them done  EKG- As indicated   Rating scales- PHQ9 and GAD7    4) Referrals-  none    5) Dispo- RTC in 3 weeks      PERTINENT BACKGROUND   Copied forward from 22 note by Dr. Hernandez                                                 [most recent eval 10/04/22]   Medical: Diagnosed with L breast cancer in Dec 2020 after presenting with a few mos of  back pain. Mets to L4, L5, left iliac bone as well as paraspinal mass. 2021 started   Ibrance, zoladex, and anastrozole; 21- s/p radiofrequency ablation, keloplasty/  segmental plasty of L4; 2021- showing complete response to treatment.  Psych: Lifetime history of \"rapid mood shifts\". S/P 7-8 hospitalizations, all [place]   [place] [facility] [place]eroquel started at that time, took 100mg TID x yrs. Details  in eval. Hospitalization [id number] is discussed in eval and documents what sounds like   a full manic episode. CA dx'd 2020. Zoloft started after the cancer dx. Seroquel 100mg   was stopped 2021 (not helping), doxepin and Ambien tried for sleep-neither helped. Son  2022 by accidental overdose.   Meds: Seroquel x years 100mg-300mg was helpful but 300mg too sedating & became  less effective over time; " "Invega for a short period; Zyprexa was helpful; no Haldol or Risperdal;   Lithium did not help (? 2 yrs ago) same for Depakote; trazodone; no HOWELL    Pertinent Items Include: suicidal ideation, psychosis, tom, mutiple   psychotropic trials, trauma hx, violent behavior, psych hosps, cocaine 2019. Last hosp  Nov 2021.     SUBJECTIVE     - Medical update: Oncology visit 10/17, PET 08/2022 stable L breast uptake  - Cancer treatment: anastrozole (Arimidex) po, palbociclib (Ibrance) po and zoledronic acid (Zometa) INF q 3 months     -  States she is \"okay\"  - Needs sleep, Seroqel not helping, also taking tylenol PM at times    - Trazodone didn't help, has not tried mirtazapine  - left leg pain, maybe pulled a muscle, always back pain   - Feeling down and depressed, laying in the bed, feels emotionally broken  - Son thinks she may need to check in, because she's not feeling like doing anything, low physical and mental energy    - Housing:  Has been working with [employer] (), gave apartment a $500 deposit to hold it, emergency assistance paperwork and waiting to hear from them, (encouraged to call and check)  - Oncology apts: enlarged thyroid concern, ultrasound, came back negative, will get a thyroid FNA  - Palliative care apt 11/15    - Therapy: Called and letter sent. Call [phone] to schedule therapy -requests this information to be sent in [employer]   - Reminded labs  - No SI/HI    Recent Psych Symptoms:   Depression: depressed mood, anhedonia, poor concentration  Trauma Related:  avoidance, trauma trigger psychological / physiological response and trauma memory loss  Sleep: Difficulty falling asleep and staying asleep     Recent Substance Use:     -alcohol: Occasional wine   -cannabis: Once or twice a month   -tobacco: Yes, 4-5 cigarettes a day; trying to quit  -opioids: Yes: for pain   Narcan Kit currrent: Yes   -other: none     Pertinent Negatives: No suicidal or violent ideation, self-injury, " psychosis, tom, hypomania, aggression and harmful substance use  Adverse Effects: Denies     FAMILY and SOCIAL HISTORY        Copied forward from 22 note by Dr. Hernandez       Family Hx:  Multiple relatives with schizophrenia  Social Hx:  Lives with cousin and son, supported by social security disability, has 5 adult   children and 6 grandchildren.   Older history-mom  when pt was 7yo, chaotic childhood and   h/o trauma. [place] lived between [place] and [place] over the years.    PSYCH and SUBSTANCE USE Critical Summary Points since 2022   10/4/22 - transfer visit  10/17/22 - Increased Seroquel to 150 mg at bedtime  2022 - Increased Seroquel to 200 mg at bedtime      MEDICAL HISTORY and ALLERGY     ALLERGIES: Contrast dye    Patient Active Problem List   Diagnosis     Breast mass     Spine metastasis (H)     Urinary tract infection with hematuria     Malignant neoplasm of overlapping sites of left breast in female, estrogen receptor positive (H)     Carcinoma of left breast metastatic to bone (H)     Metastasis to bone (H)     Pain of right lower leg     Malignant neoplasm of female breast, unspecified estrogen receptor status, unspecified laterality, unspecified site of breast (H)     Schizoaffective disorder, bipolar type (H)     Insomnia, unspecified type        MEDICAL REVIEW OF SYSTEMS     none in addition to that documented above     MEDICATIONS     Current Outpatient Medications   Medication Sig Dispense Refill     anastrozole (ARIMIDEX) 1 MG tablet Take 1 tablet (1 mg) by mouth daily 28 tablet 2     Benzocaine-Menthol (CEPACOL) 15-2.3 MG LOZG Take 1 lozenge by mouth every 4 hours as needed 30 lozenge 1     gabapentin (NEURONTIN) 300 MG capsule Take 2 capsules (600 mg) by mouth every morning AND 2 capsules (600 mg) daily at 2 pm AND 2 capsules (600 mg) At Bedtime. Do all this for 30 days. 180 capsule 2     guaiFENesin-codeine (GUAIFENESIN AC) 100-10 MG/5ML syrup Take 5 mLs by mouth every 4 hours as  needed for congestion or cough 180 mL 1     methocarbamol (ROBAXIN) 500 MG tablet Take 1 tablet (500 mg) by mouth 3 times daily as needed for muscle spasms 30 tablet 0     methylPREDNISolone (MEDROL) 32 MG tablet Take 1 tablet (32 mg) by mouth daily 12 hours prior to the procedure with IV contrast 1 tablet 0     morphine (MS CONTIN) 15 MG CR tablet Take 1 tablet (15 mg) by mouth every 12 hours 60 tablet 0     naloxone (NARCAN) 4 MG/0.1ML nasal spray Spray 1 spray (4 mg) into one nostril alternating nostrils once as needed for opioid reversal every 2-3 minutes until assistance arrives 0.2 mL 0     nicotine (COMMIT) 2 MG lozenge Place 1 lozenge (2 mg) inside cheek every hour as needed for smoking cessation 27 lozenge 3     nicotine (NICORETTE) 2 MG gum Place 1 each (2 mg) inside cheek every hour as needed for smoking cessation 90 each 1     oxyCODONE (ROXICODONE) 5 MG tablet Take 1 tablet (5 mg) by mouth every 6 hours as needed for moderate to severe pain 56 tablet 0     palbociclib (IBRANCE) 125 MG tablet Take 1 tablet (125 mg) by mouth daily Take for 21 days, then 7 days off. 21 tablet 2     pantoprazole (PROTONIX) 40 MG EC tablet Take 1 tablet (40 mg) by mouth every morning (before breakfast) 30 tablet 1     polyethylene glycol (MIRALAX) 17 GM/Dose powder Take 17 g by mouth daily as needed for constipation 578 g 3     QUEtiapine (SEROQUEL) 50 MG tablet Take 3 tablets (150 mg) by mouth At Bedtime 90 tablet 1     rivaroxaban ANTICOAGULANT (XARELTO) 20 MG TABS tablet Take 1 tablet (20 mg) by mouth daily (with dinner) 30 tablet 11     SENNA-docusate sodium (SENNA S) 8.6-50 MG tablet Take 2 tablets by mouth 2 times daily as needed (Constipation) 100 tablet 3     sennosides (SENOKOT) 8.6 MG tablet Take 1-2 tablets by mouth 2 times daily as needed for constipation 120 tablet 1     sertraline (ZOLOFT) 100 MG tablet Take one tab (100mg) by mouth every morning. Take in addition to 50 mg tablet for total dose of 150mg daily  "30 tablet 1     sertraline (ZOLOFT) 50 MG tablet Take 1 tablet (50 mg) by mouth daily Take in addition to 100mg tablet for total dose of 150mg daily 30 tablet 1     tolnaftate (TINACTIN) 1 % external cream Apply topically 2 times daily 30 g 1     Vitamin D3 (CHOLECALCIFEROL) 25 mcg (1000 units) tablet Take 1 tablet (25 mcg) by mouth daily 90 tablet 3      VITALS   There were no vitals taken for this visit.    MENTAL STATUS EXAM     Alertness: alert  and oriented  Appearance: casually groomed  Behavior/Demeanor: cooperative, pleasant and calm, with good  eye contact   Speech: normal  Language: intact and no problems  Psychomotor: normal or unremarkable   Mood: \"Okay\"  Affect: full range; congruent to: mood- yes, content- yes  Thought Process/Associations: unremarkable  Thought Content:  Reports none;  Denies suicidal ideation  Perception:  Reports none;  Denies auditory hallucinations  Insight: good  Judgment: good  Cognition: does  appear grossly intact; formal cognitive testing was not done  Gait and Station: N/A (telehealth)     LABS and DATA     PHQ 7/6/2022 10/4/2022   PHQ-9 Total Score 15 20   Q9: Thoughts of better off dead/self-harm past 2 weeks Not at all Not at all       Recent Labs   Lab Test 11/14/22  0947 10/17/22  1610   CR 0.83 0.82   GFRESTIMATED 87 88     Recent Labs   Lab Test 11/14/22  0947 10/17/22  1610   GLC 91 119*     Recent Labs   Lab Test 11/27/21  0752   CHOL 132   TRIG 59   LDL 64   HDL 56     Recent Labs   Lab Test 11/14/22  0947 10/17/22  1610   AST 13 17   ALT 9* 13   ALKPHOS 86 105*     Recent Labs   Lab Test 11/14/22  0947 10/17/22  1610 06/14/22  1235 05/13/22  0700   WBC 3.2* 3.4*   < > 3.7*   ANEU  --  2.1  --  1.8   HGB 11.6* 11.7   < > 10.9*    226   < > 173    < > = values in this interval not displayed.         ECG 8/10/2021 QTc = 399ms     PSYCHOTROPIC DRUG INTERACTIONS                                                       PSYCHCLINICDDI   Additive sedation, CNS " depression: most drugs on this list contribute to this     Additive serotonin: also many drugs on this list but not by way of P450 intxns     Additive QT:  Sertraline and Quetiapine    MANAGEMENT:  Monitoring for adverse effects and periodic EKGs     RISK STATEMENT for SAFETY     Cleaster did not appear to be an imminent safety risk to self or others.    TREATMENT RISK STATEMENT: The risks, benefits, alternatives and potential adverse effects have been discussed and are understood by the pt. The pt understands the risks of using street drugs or alcohol. There are no medical contraindications, the pt agrees to treatment with the ability to do so. The pt knows to call the clinic for any problems or to access emergency care if needed.  Medical and substance use concerns are documented above.  Psychotropic drug interaction check was done, including changes made today.     PSYCHIATRIST: Yasmine Castaneda MD    Patient staffed in clinic with Dr. Woodruff who will sign the note.  Supervisor is Dr. Johnson.       MEDICAL DECISION MAKING        (SmartPhrase .[email or web address])

## 2022-11-15 ENCOUNTER — VIRTUAL VISIT (OUTPATIENT)
Dept: PALLIATIVE CARE | Facility: CLINIC | Age: 47
End: 2022-11-15
Attending: INTERNAL MEDICINE
Payer: COMMERCIAL

## 2022-11-15 DIAGNOSIS — M54.9 BACK PAIN WITH HISTORY OF SPINAL SURGERY: ICD-10-CM

## 2022-11-15 DIAGNOSIS — C50.912 CARCINOMA OF LEFT BREAST METASTATIC TO BONE (H): Primary | ICD-10-CM

## 2022-11-15 DIAGNOSIS — C79.51 CARCINOMA OF LEFT BREAST METASTATIC TO BONE (H): Primary | ICD-10-CM

## 2022-11-15 DIAGNOSIS — G89.3 CANCER ASSOCIATED PAIN: ICD-10-CM

## 2022-11-15 DIAGNOSIS — Z98.890 BACK PAIN WITH HISTORY OF SPINAL SURGERY: ICD-10-CM

## 2022-11-15 PROCEDURE — 99215 OFFICE O/P EST HI 40 MIN: CPT | Mod: 95 | Performed by: INTERNAL MEDICINE

## 2022-11-15 RX ORDER — MORPHINE SULFATE 15 MG/1
15 TABLET, FILM COATED, EXTENDED RELEASE ORAL DAILY
Qty: 60 TABLET | Refills: 0 | COMMUNITY
Start: 2022-11-15 | End: 2022-11-16

## 2022-11-15 NOTE — LETTER
Date:November 15, 2022      Provider requested that no letter be sent. Do not send.       [facility]

## 2022-11-15 NOTE — NURSING NOTE
Patient denies any changes since echeck-in regarding medication and allergies and states all information entered during echeck-in remains accurate.    Oncology Distress screening elevated, pt has requested all support care professionals. PHQ-2, not completed--pt states she had an appointment with psychiatry yesterday.    Shannon Salinas, Visit Facilitator/MA.

## 2022-11-15 NOTE — LETTER
11/15/2022       RE: Teresa Sanderson  [address] [address]     Dear Colleague,    [facility] you [facility] your [facility]erson[facility]. Please see a copy of my visit note below.    Teresa is a 47 year old who is being evaluated via a billable video visit.      How would you like to obtain your AVS? MyChart  If the video visit is dropped, the invitation should be resent by: Text to cell phone: [phone]  Will anyone else be joining your video visit? No      Shannon Salinas, Visit Facilitator/MA.      Palliative Care Outpatient Clinic      Patient ID:  Medical - She has metastatic breast cancer dx 2020; widespread bone mets. S/p RT to lumbar spine and RFA/kyphoplasty L4 2021.   2022 on Ibrance and anastrazole since 2021 (with some treatment interruptions).    She has schizoaffective disorder (bipolar type); followed by psychiatry at the .    Social - Lives with cousin and son. On SSD. 5 adult children. Housing insecure. Daughter murdered in . Son  Spring 2022. Worked as a  for [employer] before cancer dx, in [place].   No LOYD hx    Care Planning -       History:  History gathered today from: patient, medical chart    She last saw Dr Tellez about 10 months ago. I've not met her before today.  She follows with psychiatry for schizoaffective disorder: seroquel 150 mg nightly, sertraline 150 mg daily.   Per : on MSContin 15 mg bid (but using #60 tabs q2 months more or less).   OxycodoneIR 5 mg tabs #90 tabs q1-2 months.    She is referred back, per chart review, oncology would like our help with weaning opioids.    She describes: constant back pain, worse with having to stay in a single position a long time whether standing or sitting.  Most of her pain is in her LS area around where she had the RFA ~18 mo ago. Her pain improved partially but has  "persisted.  Also has new L leg pain--groin \"panty line\" area. Had a CT showing not much.    Gabap 600 tid  MSContin 15 mg qam; she says she tries to take it bid but looking at what she gets refills it's more like once a day.  OxyIR 5 mg tid most days  Takes APAP or ibuprofen too  Has had episodes when she was out of oxycodone for over a week: uses just ibuprofen or tylenol during this time.    I asked her how motivated she is to get off opioids: she is ambivalent. Wishes her pain was better. Also appreciates that she doesn't have great pain outcomes currently (on opioids)--still has daily pain.     PE: There were no vitals taken for this visit.   Wt Readings from Last 3 Encounters:   11/14/22 115.1 kg (253 lb 11.2 oz)   11/14/22 115.7 kg (255 lb)   10/17/22 115.1 kg (253 lb 12.8 oz)     Alert NAD  Clear sensorium      Data reviewed:  I reviewed recent labs and imaging, my comments:  Cr 0.83  CA 15.3 38 (stable since Aug)    PET in Aug--widespread sclerotic bone mets not FDG avid.     database reviewed: y      Impression & Recommendations:    48 yo with metastatic breast cancer, currently under good long term control, but with chronic & persistent pain related to bone mets, pathologic VCF, etc. Long term opioid use in that context.     We had a long talk about opioid tapering. She is ambivalent, but also has insight into the fact she does not get great pain outcomes as it is on them. D/w her many patients in her situation, in the absence of a cause of active inflammatory pain (which I don't think she has), find that pain long term is no worse, nor is function, if we taper opioids down and off. Importantly, pain is worse temporarily but then her body/brain adapts and pain settles back down. She says she'd want to try this.    I rec'd:  Looking at the  she already is actually taking the MSContin at 15 mg daily on avg, not bid as ordered.  For now, rec continuing MSContin 15 mg daily  I recommend reducing her " oxycodone script by small amounts every month--from 90 down to 80, 70, 60, etc.  Physical therapy--she's agreeable with that.   She has insight into the fact that her mood, grief, can affect her pain and we talked about that a lot. Her pain comes her damaged spine but her mood (etc) can dial that up or down.  I don't see any evidence of janice harm from chronic opioid therapy for her; although she has some elevated risk of harm due to her psychiatric comorbidities. If her function worsens with opioid tapering long term I think we should consider that a failure and return her to prior effective doses.     Suggested she call [facility] about PCA program:  [phone]    Over 40 minutes spent on the date of the encounter doing chart review, history and exam, patient education & counseling, documentation and other activities as noted above.    Thank you for involving us in the patient's care.   Mt Diaz MD / Palliative Medicine / Text me via [employer].      Video-Visit Details    Video Start Time: 350p    Type of service:  Video Visit    Video End Time:4:19 PM    Originating Location (pt. Location): Home        Distant Location (provider location):  Off-site    Platform used for Video Visit: Well      Again, thank you for allowing me to participate in the care of your patient.      Sincerely,    Mt Diaz MD

## 2022-11-15 NOTE — PROGRESS NOTES
"Teresa is a 47 year old who is being evaluated via a billable video visit.      How would you like to obtain your AVS? MyChart  If the video visit is dropped, the invitation should be resent by: Text to cell phone: [phone]  Will anyone else be joining your video visit? No      Shannon Salinas, Visit Facilitator/MA.      Palliative Care Outpatient Clinic      Patient ID:  Medical - She has metastatic breast cancer dx 2020; widespread bone mets. S/p RT to lumbar spine and RFA/kyphoplasty L4 2021.   2022 on Ibrance and anastrazole since 2021 (with some treatment interruptions).    She has schizoaffective disorder (bipolar type); followed by psychiatry at the [facility] with cousin and son. On SSD. 5 adult children. Housing insecure. Daughter murdered in . Son  Spring 2022. Worked as a  for [employer] before cancer dx, in [place].   No LOYD hx    Care Planning -       History:  History gathered today from: patient, medical chart    She last saw Dr Tellez about 10 months ago. I've not met her before today.  She follows with psychiatry for schizoaffective disorder: seroquel 150 mg nightly, sertraline 150 mg daily.   Per : on MSContin 15 mg bid (but using #60 tabs q2 months more or less).   OxycodoneIR 5 mg tabs #90 tabs q1-2 months.    She is referred back, per chart review, oncology would like our help with weaning opioids.    She describes: constant back pain, worse with having to stay in a single position a long time whether standing or sitting.  Most of her pain is in her LS area around where she had the RFA ~18 mo ago. Her pain improved partially but has persisted.  Also has new L leg pain--groin \"panty line\" area. Had a CT showing not much.    Gabap 600 tid  MSContin 15 mg qam; she says she tries to take it bid but looking at what she gets refills it's more like once a day.  OxyIR 5 mg tid most days  Takes APAP or ibuprofen too  Has had episodes when she was out " of oxycodone for over a week: uses just ibuprofen or tylenol during this time.    I asked her how motivated she is to get off opioids: she is ambivalent. Wishes her pain was better. Also appreciates that she doesn't have great pain outcomes currently (on opioids)--still has daily pain.     PE: There were no vitals taken for this visit.   Wt Readings from Last 3 Encounters:   11/14/22 115.1 kg (253 lb 11.2 oz)   11/14/22 115.7 kg (255 lb)   10/17/22 115.1 kg (253 lb 12.8 oz)     Alert NAD  Clear sensorium      Data reviewed:  I reviewed recent labs and imaging, my comments:  Cr 0.83  CA 15.3 38 (stable since Aug)    PET in Aug--widespread sclerotic bone mets not FDG avid.     database reviewed: y      Impression & Recommendations:    48 yo with metastatic breast cancer, currently under good long term control, but with chronic & persistent pain related to bone mets, pathologic VCF, etc. Long term opioid use in that context.     We had a long talk about opioid tapering. She is ambivalent, but also has insight into the fact she does not get great pain outcomes as it is on them. D/w her many patients in her situation, in the absence of a cause of active inflammatory pain (which I don't think she has), find that pain long term is no worse, nor is function, if we taper opioids down and off. Importantly, pain is worse temporarily but then her body/brain adapts and pain settles back down. She says she'd want to try this.    I rec'd:  Looking at the  she already is actually taking the MSContin at 15 mg daily on avg, not bid as ordered.  For now, rec continuing MSContin 15 mg daily  I recommend reducing her oxycodone script by small amounts every month--from 90 down to 80, 70, 60, etc.  Physical therapy--she's agreeable with that.   She has insight into the fact that her mood, grief, can affect her pain and we talked about that a lot. Her pain comes her damaged spine but her mood (etc) can dial that up or down.  I don't  see any evidence of janice harm from chronic opioid therapy for her; although she has some elevated risk of harm due to her psychiatric comorbidities. If her function worsens with opioid tapering long term I think we should consider that a failure and return her to prior effective doses.     Suggested she call [facility] about PCA program:  [phone]    Over 40 minutes spent on the date of the encounter doing chart review, history and exam, patient education & counseling, documentation and other activities as noted above.    Thank you for involving us in the patient's care.   Mt Diaz MD / Palliative Medicine / Darin me via [facility].      Video-Visit Details    Video Start Time: 350p    Type of service:  Video Visit    Video End Time:4:19 PM    Originating Location (pt. Location): Home        Distant Location (provider location):  Off-site    Platform used for Video Visit: Chad

## 2022-11-16 DIAGNOSIS — G89.3 CANCER ASSOCIATED PAIN: ICD-10-CM

## 2022-11-16 DIAGNOSIS — R94.8 ABNORMAL POSITRON EMISSION TOMOGRAPHY (PET) SCAN: ICD-10-CM

## 2022-11-16 DIAGNOSIS — E04.1 THYROID NODULE: Primary | ICD-10-CM

## 2022-11-16 RX ORDER — MORPHINE SULFATE 15 MG/1
15 TABLET, FILM COATED, EXTENDED RELEASE ORAL DAILY
Qty: 60 TABLET | Refills: 0 | Status: SHIPPED | OUTPATIENT
Start: 2022-11-16 | End: 2023-03-16

## 2022-11-20 ENCOUNTER — MYC MEDICAL ADVICE (OUTPATIENT)
Dept: PSYCHIATRY | Facility: CLINIC | Age: 47
End: 2022-11-20

## 2022-11-21 ENCOUNTER — TELEPHONE (OUTPATIENT)
Dept: ONCOLOGY | Facility: CLINIC | Age: 47
End: 2022-11-21

## 2022-11-21 DIAGNOSIS — C50.912 CARCINOMA OF LEFT BREAST METASTATIC TO BONE (H): Primary | ICD-10-CM

## 2022-11-21 DIAGNOSIS — C50.812 MALIGNANT NEOPLASM OF OVERLAPPING SITES OF LEFT BREAST IN FEMALE, ESTROGEN RECEPTOR POSITIVE (H): ICD-10-CM

## 2022-11-21 DIAGNOSIS — Z17.0 MALIGNANT NEOPLASM OF OVERLAPPING SITES OF LEFT BREAST IN FEMALE, ESTROGEN RECEPTOR POSITIVE (H): ICD-10-CM

## 2022-11-21 DIAGNOSIS — C79.51 CARCINOMA OF LEFT BREAST METASTATIC TO BONE (H): Primary | ICD-10-CM

## 2022-11-21 RX ORDER — ANASTROZOLE 1 MG/1
1 TABLET ORAL DAILY
Qty: 28 TABLET | Refills: 2 | Status: SHIPPED | OUTPATIENT
Start: 2022-11-21 | End: 2023-01-05

## 2022-11-21 NOTE — TELEPHONE ENCOUNTER
PA Initiation    Medication: Ibrance- PA renewal initiated   Insurance Company: [employer] - Phone [phone] Fax [phone]  Pharmacy Filling the Rx: [facility] - [place] [address] [address]  Filling Pharmacy Phone:    Filling Pharmacy Fax:    Start Date: 11/21/2022

## 2022-11-22 ENCOUNTER — TELEPHONE (OUTPATIENT)
Dept: ONCOLOGY | Facility: CLINIC | Age: 47
End: 2022-11-22

## 2022-11-22 NOTE — PROGRESS NOTES
CLINICAL NUTRITION SERVICES- ONCOLOGY DISTRESS SCREENING     Identified Concern and Score From Distress Screenin. How concerned are you about your ability to eat? :  0  2. How concerned are you about unintended weight loss or your current weight? : 0  3. Patient requested to speak with a Dietitian on the Oncology Distress Screening tool. Yes     Date of Distress Screenin/15     Findings: Pt reports that her appetite is variable and that she tends to graze all day on different foods including a lot of sweets.      Intervention: Discussed current PO intake. Discussed eating at meal times that work for her schedule. Encouraged including a protein, grain, healthy fat, and veggie/fruit at each meal to help with satiety. Also discussed portion sizes of the sweet foods Teresa is eating.      Education Provided: as above     Follow-up Required: ADELE Hernandez RD, LD  [phone]

## 2022-12-02 NOTE — PROGRESS NOTES
VIDEO VISIT  Teresa Sanderson is a 47 year old patient who is being evaluated via a billable video visit.      How would you like to obtain your AVS?: MyChart  AVS SmartPhrase [PsychAVS] has been placed in 'Patient Instructions': Yes      Video- Visit Details  Type of service:  video visit for medication management  Time of service:    Start Time:  8:50 AM    End Time:  9:20 AM    Originating Site (patient location):  State- [place]   Location- Friend or family home  Distant Site (provider location):  Remote location  Mode of Communication:  Video Conference via [facility]  Psychiatry Clinic  MEDICAL PROGRESS NOTE     CARE TEAM:  PCP- Physician No Ref-Primary    Psychotherapist- None   Teresa is a 47 year old who uses the name Teresa and pronouns she, her, hers.      DIAGNOSIS   Schizoaffective disorder, bipolar type (previously diagnosed with bipolar disorder)  Bereavement  Metastatic breast cancer     ASSESSMENT   Teresa states she is not doing so well overall. She moved this past weekend into an apartment and hurt her back during the move. She states she did not get approved for the emergency assistance and she had to use all of her money for the deposit and rent. She is under a lot of stress especially regarding her finances.  I will reach out to social work today to inquire about helping her reapply for the emergency assistance, as well as food support.     She did not start taking the increased dose of Seroquel so we will continue this plan to target her mood and sleep. She was reminded  to complete her lipid and hgb A1C labs and states that she plans to on Thursday. She also has the number to make an appointment for therapy and she will call when she is ready.     [place]:  Indicates taking controlled medication as prescribed.     PLAN                                                                                                                1)  "Meds-  Increase: Seroquel to 200 mg at bedtime  Continue: Zoloft 150 mg daily     Prescribed by Heme Onc:  oxyCODONE (ROXICODONE) 5 MG tablet - 5-10 mg every 6 hours as needed for moderate to severe pain  morphine (MS CONTIN) 15 MG CR tablet - 15 mg by mouth every 12 hours  Gabapentin 600 mg in the morning, 600 mg at 2 pm and 600 mg at bedtime  Robaxin 500 mg TID PRN for muscle spasms     2) Psychotherapy- Referral placed; Teresa given phone number through griddig and states she will call to schedule      3) Next due-  Labs- SGA labs: lipid and Hgb A1C previously ordered and reminded to get them done  EKG- As indicated   Rating scales- PHQ9 and GAD7    4) Referrals-  none    5) Dispo- RTC in 4 weeks      PERTINENT BACKGROUND   Copied forward from 22 note by Dr. Hernandez                                                 [most recent eval 10/04/22]   Medical: Diagnosed with L breast cancer in Dec 2020 after presenting with a few mos of  back pain. Mets to L4, L5, left iliac bone as well as paraspinal mass. 2021 started   Ibrance, zoladex, and anastrozole; 21- s/p radiofrequency ablation, keloplasty/  segmental plasty of L4; 2021- showing complete response to treatment.  Psych: Lifetime history of \"rapid mood shifts\". S/P 7-8 hospitalizations, all in [place] except   the 1st [place] [facility] [place] . Seroquel started at that time, took 100mg TID x yrs. Details  in eval. Hospitalization [id number] is discussed in eval and documents what sounds like   a full manic episode. CA dx'd 2020. Zoloft started after the cancer dx. Seroquel 100mg   was stopped 2021 (not helping), doxepin and Ambien tried for sleep-neither helped. Son  2022 by accidental overdose.   Meds: Seroquel x years 100mg-300mg was helpful but 300mg too sedating & became  less effective over time; Invega for a short period; Zyprexa was helpful; no Haldol or Risperdal;   Lithium did not help (? 2 yrs ago) same for Depakote; trazodone; " no HOWELL    Pertinent Items Include: suicidal ideation, psychosis, tom, mutiple   psychotropic trials, trauma hx, violent behavior, psych hosps, cocaine 2019. Last hosp  Nov 2021.     SUBJECTIVE     - Medical update: Oncology visit 10/17, PET 08/2022 stable L breast uptake  - Cancer treatment: anastrozole (Arimidex) po, palbociclib (Ibrance) po and zoledronic acid (Zometa) INF q 3 months     - Not so good, pain, back is hurting, mentally distressed, disappointment in everything  - Moved over the weekend, her and son, didn't have help and hurt her back while moving, can't live in apt because doesn't have furniture, used cash on first month rent and deposit, currently on the couch at a family friends house   - Emergency assistance denied her so she had to use all of her personal money   - Has tried contacted  - wants a referral for [facility] and help with reapplication of emergency assistance if possible also food resources   - Applied for son to be her PCA   - Son found a job    - Mood- is down, doesn't want to hear any more disappointments   - Has been struggling since being in [place]  - At this point not wanting to talk to anybody   - Anxiety: high   - Increase Seroquel help with sleep? Hasn't picked up the increase yet, pharmacy is always a long wait     - SI/HI: no  - Any changes in substance use: no changes     Recent Psych Symptoms:   Depression: depressed mood, anhedonia, poor concentration  Trauma Related:  avoidance, trauma trigger psychological / physiological response and trauma memory loss  Sleep: Difficulty falling asleep and staying asleep   Anxiety: increased worry    Recent Substance Use:     -alcohol: Occasional wine   -cannabis: Once or twice a month   -tobacco: Yes, 4-5 cigarettes a day; trying to quit  -opioids: Yes: for pain   Narcan Kit currrent: Yes   -other: none     Pertinent Negatives: No suicidal or violent ideation, self-injury, psychosis, tom, hypomania, aggression and harmful  substance use  Adverse Effects: Denies     FAMILY and SOCIAL HISTORY        Copied forward from 22 note by Dr. Hernandez       Family Hx:  Multiple relatives with schizophrenia  Social Hx:  Lives with cousin and son, supported by social security disability, has 5 adult   children and 6 grandchildren.   Older history-mom  when pt was 7yo, chaotic childhood and   h/o trauma. [place] lived between [place] and [place] over the [place].    PSYCH and SUBSTANCE USE Critical Summary Points since 2022   10/4/22 - transfer visit  10/17/22 - Increased Seroquel to 150 mg at bedtime  2022 - Increased Seroquel to 200 mg at bedtime   2022 - Did not increase Seroquel; continue plan to increase to 200 mg at bedtime    MEDICAL HISTORY and ALLERGY     ALLERGIES: Contrast dye    Patient Active Problem List   Diagnosis     Breast mass     Spine metastasis (H)     Urinary tract infection with hematuria     Malignant neoplasm of overlapping sites of left breast in female, estrogen receptor positive (H)     Carcinoma of left breast metastatic to bone (H)     Metastasis to bone (H)     Pain of right lower leg     Malignant neoplasm of female breast, unspecified estrogen receptor status, unspecified laterality, unspecified site of breast (H)     Schizoaffective disorder, bipolar type (H)     Insomnia, unspecified type        MEDICAL REVIEW OF SYSTEMS     none in addition to that documented above     MEDICATIONS     Current Outpatient Medications   Medication Sig Dispense Refill     anastrozole (ARIMIDEX) 1 MG tablet Take 1 tablet (1 mg) by mouth daily for 28 days 28 tablet 2     anastrozole (ARIMIDEX) 1 MG tablet Take 1 tablet (1 mg) by mouth daily 28 tablet 2     Benzocaine-Menthol (CEPACOL) 15-2.3 MG LOZG Take 1 lozenge by mouth every 4 hours as needed 30 lozenge 1     gabapentin (NEURONTIN) 300 MG capsule Take 2 capsules (600 mg) by mouth every morning AND 2 capsules (600 mg) daily at 2 pm AND 2 capsules (600 mg) At Bedtime. Do all  this for 30 days. 180 capsule 2     guaiFENesin-codeine (GUAIFENESIN AC) 100-10 MG/5ML syrup Take 5 mLs by mouth every 4 hours as needed for congestion or cough 180 mL 1     methocarbamol (ROBAXIN) 500 MG tablet Take 1 tablet (500 mg) by mouth 3 times daily as needed for muscle spasms 30 tablet 0     methylPREDNISolone (MEDROL) 32 MG tablet Take 1 tablet (32 mg) by mouth daily 12 hours prior to the procedure with IV contrast 1 tablet 0     morphine (MS CONTIN) 15 MG CR tablet Take 1 tablet (15 mg) by mouth daily 60 tablet 0     naloxone (NARCAN) 4 MG/0.1ML nasal spray Spray 1 spray (4 mg) into one nostril alternating nostrils once as needed for opioid reversal every 2-3 minutes until assistance arrives 0.2 mL 0     nicotine (COMMIT) 2 MG lozenge Place 1 lozenge (2 mg) inside cheek every hour as needed for smoking cessation 27 lozenge 3     nicotine (NICORETTE) 2 MG gum Place 1 each (2 mg) inside cheek every hour as needed for smoking cessation 90 each 1     palbociclib (IBRANCE) 125 MG tablet Take 1 tablet (125 mg) by mouth daily Take for 21 days, then 7 days off. 21 tablet 2     palbociclib (IBRANCE) 125 MG tablet Take 1 tablet (125 mg) by mouth daily Take for 21 days, then 7 days off. 21 tablet 2     pantoprazole (PROTONIX) 40 MG EC tablet Take 1 tablet (40 mg) by mouth every morning (before breakfast) 30 tablet 1     polyethylene glycol (MIRALAX) 17 GM/Dose powder Take 17 g by mouth daily as needed for constipation 578 g 3     QUEtiapine (SEROQUEL) 50 MG tablet Take 4 tablets (200 mg) by mouth At Bedtime 120 tablet 1     rivaroxaban ANTICOAGULANT (XARELTO) 20 MG TABS tablet Take 1 tablet (20 mg) by mouth daily (with dinner) 30 tablet 11     SENNA-docusate sodium (SENNA S) 8.6-50 MG tablet Take 2 tablets by mouth 2 times daily as needed (Constipation) 100 tablet 3     sennosides (SENOKOT) 8.6 MG tablet Take 1-2 tablets by mouth 2 times daily as needed for constipation 120 tablet 1     sertraline (ZOLOFT) 100 MG  "tablet Take one tab (100mg) by mouth every morning. Take in addition to 50 mg tablet for total dose of 150mg daily 30 tablet 1     sertraline (ZOLOFT) 50 MG tablet Take 1 tablet (50 mg) by mouth daily Take in addition to 100mg tablet for total dose of 150mg daily 30 tablet 1     tolnaftate (TINACTIN) 1 % external cream Apply topically 2 times daily 30 g 1     Vitamin D3 (CHOLECALCIFEROL) 25 mcg (1000 units) tablet Take 1 tablet (25 mcg) by mouth daily 90 tablet 3      VITALS   There were no vitals taken for this visit.    MENTAL STATUS EXAM     Alertness: alert  and oriented  Appearance: casually groomed  Behavior/Demeanor: cooperative, pleasant and calm, with good  eye contact   Speech: normal  Language: intact and no problems  Psychomotor: normal or unremarkable   Mood: \"Not so good\"  Affect: full range; congruent to: mood- yes, content- yes  Thought Process/Associations: unremarkable  Thought Content:  Reports none;  Denies suicidal ideation  Perception:  Reports none;  Denies auditory hallucinations  Insight: good  Judgment: good  Cognition: does  appear grossly intact; formal cognitive testing was not done  Gait and Station: N/A ([facility])     LABS and DATA     PHQ 7/6/2022 10/4/2022   PHQ-9 Total Score 15 20   Q9: Thoughts of better off dead/self-harm past 2 weeks Not at all Not at all     Answers for HPI/ROS submitted by the patient on 12/5/2022  If you checked off any problems, how difficult have these problems made it for you to do your work, take care of things at home, or get along with other people?: Somewhat difficult  PHQ9 TOTAL SCORE: 22    Recent Labs   Lab Test 11/14/22  0947 10/17/22  1610   CR 0.83 0.82   GFRESTIMATED 87 88     Recent Labs   Lab Test 11/14/22  0947 10/17/22  1610   GLC 91 119*     Recent Labs   Lab Test 11/27/21  0752   CHOL 132   TRIG 59   LDL 64   HDL 56     Recent Labs   Lab Test 11/14/22  0947 10/17/22  1610   AST 13 17   ALT 9* 13   ALKPHOS 86 105*     Recent Labs   Lab " Test 11/14/22  0947 10/17/22  1610 06/14/22  1235 05/13/22  0700   WBC 3.2* 3.4*   < > 3.7*   ANEU  --  2.1  --  1.8   HGB 11.6* 11.7   < > 10.9*    226   < > 173    < > = values in this interval not displayed.         ECG 8/10/2021 QTc = 399ms     PSYCHOTROPIC DRUG INTERACTIONS                                                       PSYCHCLINICDDI   Additive sedation, CNS depression: most drugs on this list contribute to this     Additive serotonin: also many drugs on this list but not by way of P450 intxns     Additive QT:  Sertraline and Quetiapine    MANAGEMENT:  Monitoring for adverse effects and periodic EKGs     RISK STATEMENT for SAFETY     Cleaster did not appear to be an imminent safety risk to self or others.    TREATMENT RISK STATEMENT: The risks, benefits, alternatives and potential adverse effects have been discussed and are understood by the pt. The pt understands the risks of using street drugs or alcohol. There are no medical contraindications, the pt agrees to treatment with the ability to do so. The pt knows to call the clinic for any problems or to access emergency care if needed.  Medical and substance use concerns are documented above.  Psychotropic drug interaction check was done, including changes made today.     PSYCHIATRIST: Yasmine Castaneda MD    Patient staffed in clinic with Dr. Narvaez who will sign the note.  Supervisor is Dr. Johnson.       MEDICAL DECISION MAKING        (SmartPhslye .PSYCHBILDM)

## 2022-12-05 ENCOUNTER — VIRTUAL VISIT (OUTPATIENT)
Dept: PSYCHIATRY | Facility: CLINIC | Age: 47
End: 2022-12-05
Attending: PSYCHIATRY & NEUROLOGY
Payer: COMMERCIAL

## 2022-12-05 DIAGNOSIS — F25.0 SCHIZOAFFECTIVE DISORDER, BIPOLAR TYPE (H): Primary | ICD-10-CM

## 2022-12-05 DIAGNOSIS — Z63.4 BEREAVEMENT: ICD-10-CM

## 2022-12-05 PROCEDURE — 99214 OFFICE O/P EST MOD 30 MIN: CPT | Mod: GC | Performed by: STUDENT IN AN ORGANIZED HEALTH CARE EDUCATION/TRAINING PROGRAM

## 2022-12-05 RX ORDER — QUETIAPINE FUMARATE 50 MG/1
200 TABLET, FILM COATED ORAL AT BEDTIME
Qty: 120 TABLET | Refills: 1 | Status: SHIPPED | OUTPATIENT
Start: 2022-12-05 | End: 2023-01-03

## 2022-12-05 RX ORDER — SERTRALINE HYDROCHLORIDE 100 MG/1
TABLET, FILM COATED ORAL
Qty: 30 TABLET | Refills: 1 | Status: SHIPPED | OUTPATIENT
Start: 2022-12-05 | End: 2023-01-03

## 2022-12-05 SDOH — SOCIAL STABILITY - SOCIAL INSECURITY: DISSAPEARANCE AND DEATH OF FAMILY MEMBER: Z63.4

## 2022-12-05 ASSESSMENT — PATIENT HEALTH QUESTIONNAIRE - PHQ9
SUM OF ALL RESPONSES TO PHQ QUESTIONS 1-9: 22
SUM OF ALL RESPONSES TO PHQ QUESTIONS 1-9: 22
10. IF YOU CHECKED OFF ANY PROBLEMS, HOW DIFFICULT HAVE THESE PROBLEMS MADE IT FOR YOU TO DO YOUR WORK, TAKE CARE OF THINGS AT HOME, OR GET ALONG WITH OTHER PEOPLE: SOMEWHAT DIFFICULT

## 2022-12-05 NOTE — PATIENT INSTRUCTIONS
**For crisis resources, please see the information at the end of this document**   Patient Education    Thank you for coming to the [facility] [place] [facility].     Lab Testing:  If you had lab testing today and your results are reassuring or normal they will be mailed to you or sent through [employer] within 7 days. If the lab tests need quick action we will call you with the results. The phone number we will call with results is # [phone]. If this is not the best number please call our clinic and change the number.     Medication Refills:  If you need any refills please call your pharmacy and they will contact us. Our fax number for refills is [phone].   Three business days of notice are needed for general medication refill requests.   Five business days of notice are needed for controlled substance refill requests.   If you need to change to a different pharmacy, please contact the new pharmacy directly. The new pharmacy will help you get your medications transferred.     Contact Us:  Please call [phone] during business hours (8-5:00 M-F).   If you have medication related questions after clinic hours, or on the weekend, please call [phone].     Financial Assistance [phone]   Medical Records [phone]       MENTAL HEALTH CRISIS RESOURCES:  For a emergency help, please call 911 or go to the nearest Emergency Department.     Emergency Walk-In Options:   EmPATH Unit @ [facility] ([place]): [phone] - Specialized mental health emergency area designed to be calming  [facility] ([place]): [phone]  [facility] ([place]): [phone]  [facility]): [phone]    [facility] Crisis Information:   [place]: [phone]  [place]: [phone]  [place] ([place]) - Adult: [phone]     Child: [phone]  [place] - Adult: [phone]     Child: [phone]  [place]:  [phone]  List of all [facility] resources:   https://[place].gov/dhs/people-we-serve/adults/health-care/mental-health/resources/crisis-contacts.jsp    National Crisis Information:   Crisis Text Line: Text  [place]  to 500204  Suicide & Crisis Lifeline: 988  National Suicide Prevention Lifeline: 4-114-049-TALK (1-313.858.7612)       For online chat options, visit https://suicidepreventionlifeline.org/chat/  Poison Control Center: 7-649-036-1916  Trans Lifeline: 3-088-849-3883 - Hotline for transgender people of all ages  The [employer]: 3-935-856-6604 - Hotline for LGBT youth     For Non-Emergency Support:   Fast Tracker: Mental Health & Substance Use Disorder Resources -   https://www.[employer].org/

## 2022-12-05 NOTE — PROGRESS NOTES
Teresa Sanderson is a 47 year old who is being evaluated via a billable video visit.      Pt will join video visit via: PHRQL  If there are problems joining the visit, send backup video invite via: Text to preferred phone: [phone]    Reason for telehealth visit: Patient has requested telehealth visit    Originating location (patient location): Patient's home    Will anyone else be joining the visit? No

## 2022-12-08 ENCOUNTER — ANCILLARY PROCEDURE (OUTPATIENT)
Dept: ULTRASOUND IMAGING | Facility: CLINIC | Age: 47
End: 2022-12-08
Attending: INTERNAL MEDICINE
Payer: COMMERCIAL

## 2022-12-08 DIAGNOSIS — R94.8 ABNORMAL POSITRON EMISSION TOMOGRAPHY (PET) SCAN: ICD-10-CM

## 2022-12-08 DIAGNOSIS — E04.1 THYROID NODULE: ICD-10-CM

## 2022-12-08 PROCEDURE — 88172 CYTP DX EVAL FNA 1ST EA SITE: CPT | Mod: TC | Performed by: INTERNAL MEDICINE

## 2022-12-08 PROCEDURE — 99207 US BIOPSY THYROID FINE NEEDLE ASPIRATION: CPT | Performed by: RADIOLOGY

## 2022-12-08 PROCEDURE — 88173 CYTOPATH EVAL FNA REPORT: CPT | Mod: 26 | Performed by: PATHOLOGY

## 2022-12-08 PROCEDURE — 88172 CYTP DX EVAL FNA 1ST EA SITE: CPT | Mod: 26 | Performed by: PATHOLOGY

## 2022-12-08 RX ORDER — LIDOCAINE HYDROCHLORIDE 10 MG/ML
5 INJECTION, SOLUTION INFILTRATION; PERINEURAL ONCE
Status: COMPLETED | OUTPATIENT
Start: 2022-12-08 | End: 2022-12-08

## 2022-12-08 RX ADMIN — LIDOCAINE HYDROCHLORIDE 3 ML: 10 INJECTION, SOLUTION INFILTRATION; PERINEURAL at 09:53

## 2022-12-09 LAB
PATH REPORT.COMMENTS IMP SPEC: ABNORMAL
PATH REPORT.COMMENTS IMP SPEC: YES
PATH REPORT.FINAL DX SPEC: ABNORMAL
PATH REPORT.GROSS SPEC: ABNORMAL
PATH REPORT.MICROSCOPIC SPEC OTHER STN: ABNORMAL
PATH REPORT.RELEVANT HX SPEC: ABNORMAL

## 2022-12-14 ENCOUNTER — NURSE TRIAGE (OUTPATIENT)
Dept: ONCOLOGY | Facility: CLINIC | Age: 47
End: 2022-12-14

## 2022-12-14 ENCOUNTER — PATIENT OUTREACH (OUTPATIENT)
Dept: CARE COORDINATION | Facility: CLINIC | Age: 47
End: 2022-12-14

## 2022-12-14 DIAGNOSIS — E04.1 THYROID NODULE: ICD-10-CM

## 2022-12-14 DIAGNOSIS — R89.9 ABNORMAL THYROID BIOPSY: Primary | ICD-10-CM

## 2022-12-14 NOTE — PROGRESS NOTES
Social Work Follow-Up   Oncology Clinic    Data/Intervention:  Patient Name:  Teresa Sanderson  /Age:  1975 (47 year old)    Reason for Follow-Up:  Housing supports    Collaborated With:    -Patient      Assessment:  SW returned patient's call regarding assistance with bridging resource to assist patient with furniture for their apartment. Socorroleon stated they called two different referring agencies and are waiting for a return call. SW informed patient [place] is not currently a referring agency. Patient stated they are aware of the referring agency list and will continue to reach out to agencies on it to get a referral. SW inquired about how patient was coping. Patient discussed they have not been able to enjoy their new apartment due to feeling dizzy frequently. SW provided validation of of patient's expressed feelings. SW asked if patient had reported their symptoms to the clinic. Patient stated they recently called triage and are waiting for a return call from a nurse. Patient discussed how they are anxious to get a referral for bridging. SW encouraged patient to follow back up with the agency they previously connected with and possibly call other referring agencies. Patient agreed to do so and expressed gratitude for SW's call. Patient identified no further concerns or questions at this time.     Plan:  Previously provided patient/family with writer's contact information and availability.   SW will remain available as needed.     Jo CORTEZ, [facility]  - Oncology  Phone : [phone]  Pager: [phone]

## 2022-12-14 NOTE — TELEPHONE ENCOUNTER
[facility] Triage    [facility] Message: Miranda Cunha, RNCC would like triage to call patient back to discuss dizziness and let her know to read the message in the chart isha Plunkett.  Copied from Miranda's note:  She reported symptoms of dizziness to scheduling this morning and the  did not send her to triage.       When you talk to her can you let her know to review the [employer] message Dr Plunkett sent regarding her repeat thyroid biopsy.     250 pm LM for [facility] to call [phone] option 5 option 2 and ask for any triage nurse.    Routing to Jahaira Moran, and triage

## 2022-12-16 ENCOUNTER — TELEPHONE (OUTPATIENT)
Dept: PSYCHIATRY | Facility: CLINIC | Age: 47
End: 2022-12-16

## 2022-12-16 ENCOUNTER — VIRTUAL VISIT (OUTPATIENT)
Dept: PALLIATIVE CARE | Facility: CLINIC | Age: 47
End: 2022-12-16
Attending: INTERNAL MEDICINE
Payer: COMMERCIAL

## 2022-12-16 VITALS — HEIGHT: 72 IN | WEIGHT: 245 LBS | BODY MASS INDEX: 33.18 KG/M2

## 2022-12-16 DIAGNOSIS — C50.912 CARCINOMA OF LEFT BREAST METASTATIC TO BONE (H): Primary | ICD-10-CM

## 2022-12-16 DIAGNOSIS — G89.3 CANCER ASSOCIATED PAIN: ICD-10-CM

## 2022-12-16 DIAGNOSIS — C79.51 CARCINOMA OF LEFT BREAST METASTATIC TO BONE (H): Primary | ICD-10-CM

## 2022-12-16 PROCEDURE — G0463 HOSPITAL OUTPT CLINIC VISIT: HCPCS | Mod: PN,GT | Performed by: INTERNAL MEDICINE

## 2022-12-16 PROCEDURE — 99214 OFFICE O/P EST MOD 30 MIN: CPT | Mod: 95 | Performed by: INTERNAL MEDICINE

## 2022-12-16 NOTE — LETTER
Date:December 16, 2022      Provider requested that no letter be sent. Do not send.       [facility]

## 2022-12-16 NOTE — PROGRESS NOTES
"Teresa is a 47 year old who is being evaluated via a billable video visit.      How would you like to obtain your AVS? MyChart  If the video visit is dropped, the invitation should be resent by: Send to e-mail at: [email or web address]  Will anyone else be joining your video visit? No      Miri Zimmer      Palliative Care Outpatient Clinic      Patient ID:  Medical - She has metastatic breast cancer dx 2020; widespread bone mets. S/p RT to lumbar spine and RFA/kyphoplasty L4 2021.   2022 on Ibrance and anastrazole since 2021 (with some treatment interruptions). Long discussion about voluntary opioid tapering 11/15 palliative visit.      She has schizoaffective disorder (bipolar type); [facility] psychiatry [facility] the [facility] with cousin and son. On SSD. 5 adult children. Housing insecure. Daughter murdered in . Son  Spring 2022. Worked as a  for [email or web address] before cancer dx, in [place].   No LOYD hx     Care Planning -     History:  History gathered today from: patient, medical chart    Since I saw her:  Moved to her own place.  \"I'm in rare form.\" Stressed. Move. Holidays.  Has no furniture--got a mattress her neighbor was throwing out.  Had an eval for PCA services and son has PCA hours for her now. Son got a job and is helping her a little financial.  Has enough food--using food shelves though. Got turned down for emergency assistance.  \"I am grateful tho for what I have-- a roof over my head.'  Saw her psychiatrist    Last visit we discussed tapering opioids  She has not had any opioid refills >1 month (oxycodone; last morphine in Oct).  It's not clear to me how much of that was intentional or just not having bandwidth to get refills.  She still has morphine--I think she takes it a little less than daily.  Having trouble sleeping  Ran out of Seroquel  Getting more tomorrow    PE: Ht 1.829 m (6')   Wt 111.1 kg (245 lb)   BMI 33.23 kg/m     Wt Readings from Last 3 " Encounters:   12/16/22 111.1 kg (245 lb)   11/14/22 115.1 kg (253 lb 11.2 oz)   11/14/22 115.7 kg (255 lb)     Alert nad  Clear sensorium  Full affect      Data reviewed:  I reviewed recent labs and imaging, my comments:  Hgb 11.6  Cr 08     database reviewed: Last MSER 10/17. Last oxyIR #56 11/12.       Impression & Recommendations:  48 yo with metastatic breast cancer under good control on palbociclib and pain    She was energetic and spoke a lot about the stresses of her move, lack of furniture, etc.  Also she's trying to remain positive and grateful for the good things in her life and I offered encouragement.  Looks like she was able to get PCA hours too which is great.    Last visit we discussed opioid tapering.  She has crashed off of oxycodone and is doing fine. She has pain but her hx which was a little scattered today--part of it is from sleeping on the floor etc. I dont' have any sense coming off the oxycodone was a huge challenge; she was just too busy to ask for a refill.     Rec'd she stay off the oxycodone and give her body time to adjust.  I think it best to remain on the MSContin (looking at  she takes about 15 mg a day not bid) for now to give her body time to adjust. She has a refill on file from Nov she never filled and I reminded her of that.    Follow-up 2 mo    35 minutes spent on the date of the encounter doing chart review, history and exam, patient education & counseling, documentation and other activities as noted above.    Thank you for involving us in the patient's care.   Mt Diaz MD / Palliative Medicine / Text me via [employer].      Video-Visit Details    Video Start Time: 4:36 PM    Type of service:  Video Visit    Video End Time:4:55 PM    Originating Location (pt. Location): Home        Distant Location (provider location):  On-site    Platform used for Video Visit: Chad

## 2022-12-16 NOTE — LETTER
"2022       RE: Teresa Sanderson  [address] [facility] [place] [address]     Dear Colleague,    Thank you for referring your patient, [facility]erson[facility]. Please see a copy of my visit note below.    Teresa is a 47 year old who is being evaluated via a billable video visit.      How would you like to obtain your AVS? MyChart  If the video visit is dropped, the invitation should be resent by: Send to e-mail at: [email or web address]  Will anyone else be joining your video visit? No      Miri Zimmer      Palliative Care Outpatient Clinic      Patient ID:  Medical - She has metastatic breast cancer dx 2020; widespread bone mets. S/p RT to lumbar spine and RFA/kyphoplasty L4 2021.   2022 on Ibrance and anastrazole since 2021 (with some treatment interruptions). Long discussion about voluntary opioid tapering 11/15 palliative visit.      She has schizoaffective disorder (bipolar type); followed by psychiatry at the      [facility] with cousin and son. On SSD. 5 adult children. Housing insecure. Daughter murdered in . Son  Spring 2022. Worked as a  for [employer] before cancer dx, in [place].   No LOYD hx     Care Planning -     History:  History gathered today from: patient, medical chart    Since I saw her:  Moved to her own place.  \"I'm in rare form.\" Stressed. Move. Holidays.  Has no furniture--got a mattress her neighbor was throwing out.  Had an eval for PCA services and son has PCA hours for her now. Son got a job and is helping her a little financial.  Has enough food--using food shelves though. Got turned down for emergency assistance.  \"I am grateful tho for what I have-- a roof over my head.'  Saw her psychiatrist    Last visit we discussed tapering opioids  She has not had any opioid refills >1 month (oxycodone; last morphine in Oct).  It's not clear to me how " much of that was intentional or just not having bandwidth to get refills.  She still has morphine--I think she takes it a little less than daily.  Having trouble sleeping  Ran out of Seroquel  Getting more tomorrow    PE: Ht 1.829 m (6')   Wt 111.1 kg (245 lb)   BMI 33.23 kg/m     Wt Readings from Last 3 Encounters:   12/16/22 111.1 kg (245 lb)   11/14/22 115.1 kg (253 lb 11.2 oz)   11/14/22 115.7 kg (255 lb)     Alert nad  Clear sensorium  Full affect      Data reviewed:  I reviewed recent labs and imaging, my comments:  Hgb 11.6  Cr 08     database reviewed: Last MSER 10/17. Last oxyIR #56 11/12.       Impression & Recommendations:  48 yo with metastatic breast cancer under good control on palbociclib and pain    She was energetic and spoke a lot about the stresses of her move, lack of furniture, etc.  Also she's trying to remain positive and grateful for the good things in her life and I offered encouragement.  Looks like she was able to get PCA hours too which is great.    Last visit we discussed opioid tapering.  She has crashed off of oxycodone and is doing fine. She has pain but her hx which was a little scattered today--part of it is from sleeping on the floor etc. I dont' have any sense coming off the oxycodone was a huge challenge; she was just too busy to ask for a refill.     Rec'd she stay off the oxycodone and give her body time to adjust.  I think it best to remain on the MSContin (looking at  she takes about 15 mg a day not bid) for now to give her body time to adjust. She has a refill on file from Nov she never filled and I reminded her of that.    Follow-up 2 mo    35 minutes spent on the date of the encounter doing chart review, history and exam, patient education & counseling, documentation and other activities as noted above.    Thank you for involving us in the patient's care.   Mt Diaz MD / Palliative Medicine / Text me via [facility].      Video-Visit Details    Video Start Time:  4:36 PM    Type of service:  Video Visit    Video End Time:4:55 PM    Originating Location (pt. Location): Home        Distant Location (provider location):  On-site    Platform used for Video Visit: Chad      Again, thank you for allowing me to participate in the care of your patient.      Sincerely,    Mt Diaz MD

## 2022-12-16 NOTE — TELEPHONE ENCOUNTER
[facility] Writer hoping for update on search for furniture for her new apartment.    Writer also reviewed noted. She talked with oncology social worker on 12/14 and is continuing to wait for calls from potential referral agencies.    SVETA left second message at [employer] [phone] asking about eligibility criteria and how to make a referral.    Karma Mujica, DANA, [facility]  [phone]

## 2022-12-20 ENCOUNTER — TELEPHONE (OUTPATIENT)
Dept: ONCOLOGY | Facility: CLINIC | Age: 47
End: 2022-12-20

## 2022-12-20 ENCOUNTER — PATIENT OUTREACH (OUTPATIENT)
Dept: ONCOLOGY | Facility: CLINIC | Age: 47
End: 2022-12-20

## 2022-12-20 DIAGNOSIS — Z91.041 ALLERGIC TO IV CONTRAST: ICD-10-CM

## 2022-12-20 DIAGNOSIS — R11.0 CHEMOTHERAPY-INDUCED NAUSEA: Primary | ICD-10-CM

## 2022-12-20 DIAGNOSIS — C50.919 METASTATIC BREAST CANCER: ICD-10-CM

## 2022-12-20 DIAGNOSIS — C79.51 SPINE METASTASIS: ICD-10-CM

## 2022-12-20 DIAGNOSIS — T45.1X5A CHEMOTHERAPY-INDUCED NAUSEA: Primary | ICD-10-CM

## 2022-12-20 DIAGNOSIS — R42 DIZZINESS: Primary | ICD-10-CM

## 2022-12-20 DIAGNOSIS — M54.2 NECK PAIN: ICD-10-CM

## 2022-12-20 DIAGNOSIS — C79.51 CARCINOMA OF LEFT BREAST METASTATIC TO BONE (H): ICD-10-CM

## 2022-12-20 DIAGNOSIS — C50.912 CARCINOMA OF LEFT BREAST METASTATIC TO BONE (H): ICD-10-CM

## 2022-12-20 RX ORDER — ONDANSETRON 8 MG/1
8 TABLET, FILM COATED ORAL EVERY 8 HOURS PRN
Qty: 30 TABLET | Refills: 3 | Status: SHIPPED | OUTPATIENT
Start: 2022-12-20 | End: 2024-07-20

## 2022-12-20 RX ORDER — PROCHLORPERAZINE MALEATE 10 MG
10 TABLET ORAL EVERY 6 HOURS PRN
Qty: 30 TABLET | Refills: 3 | Status: SHIPPED | OUTPATIENT
Start: 2022-12-20 | End: 2024-07-20

## 2022-12-20 RX ORDER — METHYLPREDNISOLONE 32 MG/1
TABLET ORAL
Qty: 2 TABLET | Refills: 3 | Status: SHIPPED | OUTPATIENT
Start: 2022-12-20 | End: 2023-01-05

## 2022-12-20 NOTE — PROGRESS NOTES
[facility]: Cancer Care Long Assessment    Discussion with Patient:                                                      Spoke with Teresa who reports ongoing dizziness that began 2 weeks ago. She is experiencing this with movement and at rest, she has associated nausea. She is also experiencing pain in her neck close to her skull. Pain is rated 5-6 out of 10. Pain is worse when she bends head back and looks up. Taking tylenol without relief.     Discussed with Dr. Plunkett who recommends Brain and C-spine MRI's.     Updated Teresa with plan, she verbalized agreement with plan.     Zofran, Compazine and Medrol (contrast allergy) at pharmacy.                                     Dates of Treament:                                                      Infusion given in last 28 days     None      Treatment Plan Medications     Oral ONC Breast Cancer - Palbociclib / Aromatase Inhibitor     Take Home Medications     Medication Sig Start/End Day/Cycle Status    palbociclib (IBRANCE) 125 MG tablet Take 1 tablet (125 mg) by mouth daily Take for 21 days, then 7 days off. 11/21/2022 to -- Day 1, Cycle 14 - 7/12/2022 Released    anastrozole (ARIMIDEX) 1 MG tablet Take 1 tablet (1 mg) by mouth daily for 28 days 11/21/2022 to 12/19/2022 Day 1, Cycle 14 - 7/12/2022 Released                      Assessment:                                                      Assessment completed with:: Patient    Constitutional  Patient Reported Constitutional Symptoms?: No    Neurosensory  Patient Reported Neurosensory Symptoms?: Yes  Dizziness: Moderate unsteadiness or sensation of movement OR limiting instrumental ADL    Eye Disorders  Patient Reported Eye Disorders?: No    Ear Disorders  Ear Disorders  Patient Reported Ear Disorder?: No    Cardiovascular  Patient Reported Cardiovascular Symptoms?: No    Respiratory  Patient Reported Respiratory Symptoms?: No    Gastrointestinal  Patient Reported Gastrointestinal Symptoms?:  Yes  Nausea: Loss of appetite without alteration in eating habits           Pain  Patient Reported Pain?: Yes, but is new or different pain  Pain Score: Moderate Pain (5)  Pain Loc: Neck (Neck at base of skull)  Interfered with your usual ACTIVITY?: Somewhat interferes 5      Intervention/Education provided during outreach:                                                         Patient to follow up as scheduled at next appt  Patient to call/[employer] message with updates  Confirmed patient has clinic and triage numbers         Miranda Cunha MSN, RN ,OCN  RN Care Coordinator   [facility]  [phone]

## 2022-12-20 NOTE — TELEPHONE ENCOUNTER
Oral Chemotherapy Monitoring Program    Subjective/Objective:  Teresa Sanderson is a 47 year old female contacted by phone for a follow-up visit for oral chemotherapy. Teresa confirmed dose of palbociclib 125 mg daily for 3 weeks followed by 1 week off. She is adherent and has not missed any doses in the last 2 weeks. She reports some dizziness with nausea lately. We discussed drinking about 8 glasses a day (64 ounces) and is planning to increase her water intake. IB sent to Dr. Plunkett inquiring for an antiemetic prescription. Teresa has not started any new medications and has no recent hospitalizations.     ORAL CHEMOTHERAPY 8/3/2022 8/22/2022 8/24/2022 8/31/2022 9/8/2022 11/21/2022 12/20/2022   Assessment Type Monthly Follow up Refill Left Voicemail Left Voicemail Monthly Follow up Refill Monthly Follow up   Diagnosis Code Breast Cancer Breast Cancer Breast Cancer Breast Cancer Breast Cancer Breast Cancer Breast Cancer   Providers Dr. Dimitrios Plunkett   [facility]   Drug Name Ibrance (palbociclib) Ibrance (palbociclib) Ibrance (palbociclib) Ibrance (palbociclib) Ibrance (palbociclib) Ibrance (palbociclib) Ibrance (palbociclib)   Dose - 125 mg - - 125 mg 125 mg 125 mg   Current Schedule - Daily - - Daily Daily Daily   Cycle Details - 3 weeks on, 1 week off - - 3 weeks on, 1 week off 3 weeks on, 1 week off 3 weeks on, 1 week off   Start Date of Last Cycle - - - - - - 12/11/2022   Planned next cycle start date - - - - - - 1/8/2022   Doses missed in last 2 weeks - - - - 0 - 0   Adherence Assessment - - - - Adherent - Adherent   Reason for Non-adherence - - - - - - -   Adherence Intervention Recommended - - - - - - -   Adverse Effects - - - - Fatigue - Nausea   Nausea - - - - - - Grade 1   Pharmacist Intervention(nausea) - - - - - - Yes   Intervention(s) - - - - - - Rx medication  recommendation   Fatigue - - - - Grade 1 - -   Pharmacist Intervention(fatigue) - - - - Yes - -   Intervention(s) - - - - Patient education - -   Any new drug interactions? - - - - No - -   Pharmacist Intervention? - - - - - - -   Intervention(s) - - - - - - -   Is the dose as ordered appropriate for the patient? - - - - - - -   Since the last time we talked, have you been hospitalized or used the emergency room? - - - - - - -       Last PHQ-2 Score on record:   PHQ-2 ( 1999 [employer]) 5/13/2021   Q1: Little interest or pleasure in doing things 1   Q2: Feeling down, depressed or hopeless 1   PHQ-2 Score 2   PHQ-2 Total Score (12-17 Years)- Positive if 3 or more points; Administer PHQ-A if positive 2   Some encounter information is confidential and restricted. Go to Review Flowsheets activity to see all data.       Vitals:  BP:   BP Readings from Last 1 Encounters:   11/14/22 (!) 148/98     Wt Readings from Last 1 Encounters:   12/16/22 111.1 kg (245 lb)     Estimated body surface area is 2.38 meters squared as calculated from the following:    Height as of 12/16/22: 1.829 m (6').    Weight as of 12/16/22: 111.1 kg (245 lb).    Assessment/Plan:  Patient is tolerating palbociclib with manageable adverse effects, continue as planned.    Follow-Up:  Labs 1/5/22    Refill Due:  2/13/23    Megan Green  Pharmacy Intern  Oral Chemotherapy Monitoring Program   [facility]  [phone]

## 2023-01-02 ENCOUNTER — ANCILLARY PROCEDURE (OUTPATIENT)
Dept: MRI IMAGING | Facility: CLINIC | Age: 48
End: 2023-01-02
Attending: INTERNAL MEDICINE
Payer: MEDICARE

## 2023-01-02 DIAGNOSIS — C79.51 SPINE METASTASIS: ICD-10-CM

## 2023-01-02 DIAGNOSIS — C50.912 CARCINOMA OF LEFT BREAST METASTATIC TO BONE (H): ICD-10-CM

## 2023-01-02 DIAGNOSIS — M54.2 NECK PAIN: ICD-10-CM

## 2023-01-02 DIAGNOSIS — C79.51 CARCINOMA OF LEFT BREAST METASTATIC TO BONE (H): ICD-10-CM

## 2023-01-02 PROCEDURE — 72156 MRI NECK SPINE W/O & W/DYE: CPT | Mod: GC | Performed by: RADIOLOGY

## 2023-01-02 PROCEDURE — A9585 GADOBUTROL INJECTION: HCPCS | Performed by: RADIOLOGY

## 2023-01-02 RX ORDER — GADOBUTROL 604.72 MG/ML
15 INJECTION INTRAVENOUS ONCE
Status: COMPLETED | OUTPATIENT
Start: 2023-01-02 | End: 2023-01-02

## 2023-01-02 RX ADMIN — GADOBUTROL 11 ML: 604.72 INJECTION INTRAVENOUS at 08:39

## 2023-01-03 ENCOUNTER — VIRTUAL VISIT (OUTPATIENT)
Dept: PSYCHIATRY | Facility: CLINIC | Age: 48
End: 2023-01-03
Attending: PSYCHIATRY & NEUROLOGY
Payer: MEDICARE

## 2023-01-03 DIAGNOSIS — Z63.4 BEREAVEMENT: ICD-10-CM

## 2023-01-03 DIAGNOSIS — F25.0 SCHIZOAFFECTIVE DISORDER, BIPOLAR TYPE (H): Primary | ICD-10-CM

## 2023-01-03 PROCEDURE — 99214 OFFICE O/P EST MOD 30 MIN: CPT | Mod: GT | Performed by: STUDENT IN AN ORGANIZED HEALTH CARE EDUCATION/TRAINING PROGRAM

## 2023-01-03 RX ORDER — SERTRALINE HYDROCHLORIDE 100 MG/1
TABLET, FILM COATED ORAL
Qty: 30 TABLET | Refills: 1 | Status: SHIPPED | OUTPATIENT
Start: 2023-01-03 | End: 2023-04-10

## 2023-01-03 RX ORDER — QUETIAPINE FUMARATE 100 MG/1
300 TABLET, FILM COATED ORAL AT BEDTIME
Qty: 90 TABLET | Refills: 1 | Status: SHIPPED | OUTPATIENT
Start: 2023-01-03 | End: 2023-04-10

## 2023-01-03 SDOH — SOCIAL STABILITY - SOCIAL INSECURITY: DISSAPEARANCE AND DEATH OF FAMILY MEMBER: Z63.4

## 2023-01-03 NOTE — PROGRESS NOTES
Teresa Sanderson is a 47 year old who is being evaluated via a billable video visit.      Pt will join video visit via: [employer]  If there are problems joining the visit, send backup video invite via: Text to preferred phone: [phone]    Reason for telehealth visit: Patient has requested telehealth visit    Originating location (patient location): Patient's home    Will anyone else be joining the visit? No  [facility]      VIDEO VISIT  Teresa Sanderson is a 47 year old patient who is being evaluated via a billable video visit.      How would you like to obtain your AVS?: MyChart  AVS SmartPhrase [PsychAVS] has been placed in 'Patient Instructions': Yes      Video- Visit Details  Type of service:  video visit for medication management  Time of service:    Start Time:  10:20 AM    End Time:  10:48 AM    Originating Site (patient location):  [facility]   Location- Friend or family home  Distant Site (provider location):  [facility]  Mode of Communication:  Video Conference via [employer]       [facility]  Psychiatry Clinic  MEDICAL PROGRESS NOTE     CARE TEAM:  PCP- Physician No Ref-Primary    Psychotherapist- None   Teresa is a 47 year old who uses the name Teresa and pronouns she, her, hers.      DIAGNOSIS   Schizoaffective disorder, bipolar type (previously diagnosed with bipolar disorder)  Bereavement  Metastatic breast cancer     ASSESSMENT   Teresa states she is doing about the same overall- still feeling very stressed and overwhelmed regarding finances, health and trying to get help with resources. She was able to get bedroom furniture with the help of her cousin and this has added even more of a financial strain on her monthly budget. We discussed money for food and applying for food stamps, but she said she previously applied and was denied. She has been working with our  here at the clinic and appreciates the help so far. I will reach out to  "social work to see if she is eligible for a , as that might provide even more resources options.     The increased Seroquel has been helpful for sleep. We will continue to increase to target mood and sleep. She was also reminded to complete her lipid and hgb A1C labs and states that she plans to on Jan 5th.     [place]NPMP was checked today:  Indicates taking controlled medication as prescribed.     PLAN                                                                                                                1) Meds-  Increase: Seroquel to 300 mg at bedtime  Continue: Zoloft 150 mg daily     Prescribed by [facility]:  oxyCODONE (ROXICODONE) 5 MG tablet - 5-10 mg every 6 hours as needed for moderate to severe pain  morphine (MS CONTIN) 15 MG CR tablet - 15 mg by mouth every 12 hours  Gabapentin 600 mg in the morning, 600 mg at 2 pm and 600 mg at bedtime  Robaxin 500 mg TID PRN for muscle spasms     2) Psychotherapy- Referral placed; Teresa given phone number through [employer] and states she will call to schedule      3) Next due-  Labs- SGA labs: lipid and Hgb A1C previously ordered and planning to get them done on the 5th  EKG- As indicated   Rating scales- PHQ9 and GAD7    4) Referrals-  none    5) Dispo- RTC in 1 month     PERTINENT BACKGROUND   Copied forward from 7/6/22 note by Dr. Hernandez                                                 [most recent eval 10/04/22]   Medical: Diagnosed with L breast cancer in Dec 2020 after presenting with a few mos of  back pain. Mets to L4, L5, left iliac bone as well as paraspinal mass. Jan 2021 started   Ibrance, zoladex, and anastrozole; 5/17/21- s/p radiofrequency ablation, keloplasty/  segmental plasty of L4; July 2021- showing complete response to treatment.  Psych: Lifetime history of \"rapid mood shifts\". S/P 7-8 hospitalizations, all [place]   [place] [facility] [place] Seroquel started at that time, took 100mg TID x yrs. Details  in eval. Hospitalization " [id number] is discussed in eval and documents what sounds like   a full manic episode. CA dx'd 2020. Zoloft started after the cancer dx. Seroquel 100mg   was stopped 2021 (not helping), doxepin and Ambien tried for sleep-neither helped. Son  2022 by accidental overdose.   Meds: Seroquel x years 100mg-300mg was helpful but 300mg too sedating & became  less effective over time; Invega for a short period; Zyprexa was helpful; no Haldol or Risperdal;   Lithium did not help (? 2 yrs ago) same for Depakote; trazodone; no HOWELL    Pertinent Items Include: suicidal ideation, psychosis, tom, mutiple   psychotropic trials, trauma hx, violent behavior, psych hosps, cocaine . Last hosp  2021.     SUBJECTIVE     - Medical update: Oncology visit 10/17, PET 2022 stable L breast uptake  - Cancer treatment: anastrozole (Arimidex) po, palbociclib (Ibrance) po and zoledronic acid (Zometa) INF q 3 months     - About the same, having dizzy spells, working with oncology team,MRI of cervical spine on , apt on the , no falls     - Increased Seroquel? Yes  - Help with mood or sleep? Yes helped with sleep, took 200mg last night and around 1/2 am took 100am and that was helpful for sleeping    - Not able to get into [facility]  - Able to get a bedroom set with help from cousin, family helping with food, rent is taking most of social security check  - Very stressed and frustred because of money and not able to get help  - Yesterday went to food shelf, limited food choices - has applied for food stamps and was denied   - Social work has been checking in and helping with resources   - Has been spending a lot of time calling resources with no luck and not getting approved for programs     - SI/HI: no, gets tired but no SI - sometimes wants to give up   - Any changes in substance use: no     Recent Psych Symptoms:   Depression: depressed mood, anhedonia, hopeless  Trauma Related:  avoidance, trauma trigger  psychological / physiological response and trauma memory loss  Sleep: Improving with increased seroquel  Anxiety: increased worry and feeling overwhelmed    Recent Substance Use:     -alcohol: Occasional wine   -cannabis: Once or twice a month   -tobacco: Yes, 4-5 cigarettes a day; trying to quit  -opioids: Yes: for pain   Narcan Kit currrent: Yes   -other: none     Pertinent Negatives: No suicidal or violent ideation, self-injury, psychosis, tom, hypomania, aggression and harmful substance use  Adverse Effects: Denies     FAMILY and SOCIAL HISTORY        Copied forward from 22 note by Dr. Hernandez       Family Hx:  Multiple relatives with schizophrenia  Social Hx:  Lives with cousin and son, supported by social security disability, has 5 adult   children and 6 grandchildren.   Older history-mom  when pt was 9yo, chaotic childhood and   h/o trauma. Has lived between [place] and [place] over the years.    PSYCH and SUBSTANCE USE Critical Summary Points since 2022   10/4/22 - transfer visit  10/17/22 - Increased Seroquel to 150 mg at bedtime  2022 - Increased Seroquel to 200 mg at bedtime   2022 - Did not increase Seroquel; continue plan to increase to 200 mg at bedtime   2023- Increase Seroquel to 300 mg at bedtime    MEDICAL HISTORY and ALLERGY     ALLERGIES: Contrast dye    Patient Active Problem List   Diagnosis     Breast mass     Spine metastasis (H)     Urinary tract infection with hematuria     Malignant neoplasm of overlapping sites of left breast in female, estrogen receptor positive (H)     Carcinoma of left breast metastatic to bone (H)     Metastasis to bone (H)     Pain of right lower leg     Malignant neoplasm of female breast, unspecified estrogen receptor status, unspecified laterality, unspecified site of breast (H)     Schizoaffective disorder, bipolar type (H)     Insomnia, unspecified type        MEDICAL REVIEW OF SYSTEMS     none in addition to that documented above      MEDICATIONS     Current Outpatient Medications   Medication Sig Dispense Refill     anastrozole (ARIMIDEX) 1 MG tablet Take 1 tablet (1 mg) by mouth daily for 28 days 28 tablet 2     anastrozole (ARIMIDEX) 1 MG tablet Take 1 tablet (1 mg) by mouth daily 28 tablet 2     Benzocaine-Menthol (CEPACOL) 15-2.3 MG LOZG Take 1 lozenge by mouth every 4 hours as needed 30 lozenge 1     gabapentin (NEURONTIN) 300 MG capsule Take 2 capsules (600 mg) by mouth every morning AND 2 capsules (600 mg) daily at 2 pm AND 2 capsules (600 mg) At Bedtime. Do all this for 30 days. 180 capsule 2     guaiFENesin-codeine (GUAIFENESIN AC) 100-10 MG/5ML syrup Take 5 mLs by mouth every 4 hours as needed for congestion or cough 180 mL 1     methocarbamol (ROBAXIN) 500 MG tablet Take 1 tablet (500 mg) by mouth 3 times daily as needed for muscle spasms 30 tablet 0     methylPREDNISolone (MEDROL) 32 MG tablet 12 hours prior to scan and 2 hours prior to scan 2 tablet 3     methylPREDNISolone (MEDROL) 32 MG tablet Take 1 tablet (32 mg) by mouth daily 12 hours prior to the procedure with IV contrast 1 tablet 0     morphine (MS CONTIN) 15 MG CR tablet Take 1 tablet (15 mg) by mouth daily 60 tablet 0     naloxone (NARCAN) 4 MG/0.1ML nasal spray Spray 1 spray (4 mg) into one nostril alternating nostrils once as needed for opioid reversal every 2-3 minutes until assistance arrives 0.2 mL 0     nicotine (COMMIT) 2 MG lozenge Place 1 lozenge (2 mg) inside cheek every hour as needed for smoking cessation 27 lozenge 3     nicotine (NICORETTE) 2 MG gum Place 1 each (2 mg) inside cheek every hour as needed for smoking cessation 90 each 1     ondansetron (ZOFRAN) 8 MG tablet Take 1 tablet (8 mg) by mouth every 8 hours as needed for nausea 30 tablet 3     palbociclib (IBRANCE) 125 MG tablet Take 1 tablet (125 mg) by mouth daily Take for 21 days, then 7 days off. 21 tablet 2     palbociclib (IBRANCE) 125 MG tablet Take 1 tablet (125 mg) by mouth daily Take for 21  "days, then 7 days off. 21 tablet 2     pantoprazole (PROTONIX) 40 MG EC tablet Take 1 tablet (40 mg) by mouth every morning (before breakfast) 30 tablet 1     polyethylene glycol (MIRALAX) 17 GM/Dose powder Take 17 g by mouth daily as needed for constipation 578 g 3     prochlorperazine (COMPAZINE) 10 MG tablet Take 1 tablet (10 mg) by mouth every 6 hours as needed for nausea or vomiting 30 tablet 3     QUEtiapine (SEROQUEL) 50 MG tablet Take 4 tablets (200 mg) by mouth At Bedtime 120 tablet 1     rivaroxaban ANTICOAGULANT (XARELTO) 20 MG TABS tablet Take 1 tablet (20 mg) by mouth daily (with dinner) 30 tablet 11     SENNA-docusate sodium (SENNA S) 8.6-50 MG tablet Take 2 tablets by mouth 2 times daily as needed (Constipation) 100 tablet 3     sennosides (SENOKOT) 8.6 MG tablet Take 1-2 tablets by mouth 2 times daily as needed for constipation 120 tablet 1     sertraline (ZOLOFT) 100 MG tablet Take one tab (100mg) by mouth every morning. Take in addition to 50 mg tablet for total dose of 150mg daily 30 tablet 1     sertraline (ZOLOFT) 50 MG tablet Take 1 tablet (50 mg) by mouth daily Take in addition to 100mg tablet for total dose of 150mg daily 30 tablet 1     tolnaftate (TINACTIN) 1 % external cream Apply topically 2 times daily 30 g 1     Vitamin D3 (CHOLECALCIFEROL) 25 mcg (1000 units) tablet Take 1 tablet (25 mcg) by mouth daily 90 tablet 3      VITALS   There were no vitals taken for this visit.    MENTAL STATUS EXAM     Alertness: alert  and oriented  Appearance: casually groomed  Behavior/Demeanor: cooperative, pleasant and calm, with good  eye contact   Speech: normal  Language: intact and no problems  Psychomotor: normal or unremarkable   Mood: \"About the same\"  Affect: full range; congruent to: mood- yes, content- yes  Thought Process/Associations: unremarkable  Thought Content:  Reports none;  Denies suicidal ideation  Perception:  Reports none;  Denies auditory hallucinations  Insight: good  Judgment: " good  Cognition: does  appear grossly intact; formal cognitive testing was not done  Gait and Station: N/A (telehealth)     LABS and DATA     PHQ 7/6/2022 10/4/2022 12/5/2022   PHQ-9 Total Score 15 20 22   Q9: Thoughts of better off dead/self-harm past 2 weeks Not at all Not at all Not at all       Recent Labs   Lab Test 11/14/22  0947 10/17/22  1610   CR 0.83 0.82   GFRESTIMATED 87 88     Recent Labs   Lab Test 11/14/22  0947 10/17/22  1610   GLC 91 119*     Recent Labs   Lab Test 11/27/21  0752   CHOL 132   TRIG 59   LDL 64   HDL 56     Recent Labs   Lab Test 11/14/22  0947 10/17/22  1610   AST 13 17   ALT 9* 13   ALKPHOS 86 105*     Recent Labs   Lab Test 11/14/22  0947 10/17/22  1610 06/14/22  1235 05/13/22  0700   WBC 3.2* 3.4*   < > 3.7*   ANEU  --  2.1  --  1.8   HGB 11.6* 11.7   < > 10.9*    226   < > 173    < > = values in this interval not displayed.         ECG 8/10/2021 QTc = 399ms     PSYCHOTROPIC DRUG INTERACTIONS                                                       PSYCHCLINICDDI   Additive sedation, CNS depression: most drugs on this list contribute to this     Additive serotonin: also many drugs on this list but not by way of P450 intxns     Additive QT:  Sertraline and Quetiapine    MANAGEMENT:  Monitoring for adverse effects and periodic EKGs     RISK STATEMENT for SAFETY     Cleaster did not appear to be an imminent safety risk to self or others.    TREATMENT RISK STATEMENT: The risks, benefits, alternatives and potential adverse effects have been discussed and are understood by the pt. The pt understands the risks of using street drugs or alcohol. There are no medical contraindications, the pt agrees to treatment with the ability to do so. The pt knows to call the clinic for any problems or to access emergency care if needed.  Medical and substance use concerns are documented above.  Psychotropic drug interaction check was done, including changes made today.     PSYCHIATRIST: Yasmine  MD Leonel    Patient staffed in clinic with Dr. Hernandez who will sign the note.  Supervisor is Dr. Johnson.

## 2023-01-03 NOTE — PROGRESS NOTES
Oncology Visit:   Date on this visit: Jan 5, 2023    Diagnosis:  ER positive left breast cancer metastasized to bones.     Primary Physician: No Ref-Primary, Physician     History Of Present Illness:    Ms. Sanderson is a 47 year old female with a h/o tobacco abuse and DVTs with left breast cancer metastasized to bone. She presented to [facility] ED with back pain on 12/5/2020. MRI of the L-spine showed an abnormal L4 lesion with associated right paraspinal mass, abnormalities in L5 and the left iliac bone were also seen. CT C/A/P showed a left breast mass, lytic lesions of T7, L4, and the pelvis, and a 3 cm lesion in the kidney (thought to be a cyst). Ultrasound of the bilateral lower extremities showed a non-occlusive thrombus in the left popliteal vein. Mammogram and ultrasound of the bilateral breasts on 12/17/2020 showed a spiculated mass measuring at least 7.8 cm at 12-1:00 left breast extending from the nipple to 9 cm from the nipple with associated nipple retraction. This mass was biopsied, and showed IDC with surrounding DCIS, grade 3, ER+ 90%, and MT+ 75%.  HER2 was equivocal in approximately 35% of tumor cells by FISH and was negative by IHC.    Metastatic Breast Cancer Treatment:  12/23/2021 - 1/7/2021  Radiation (3000 cGy) to the lumbar spine.  1/29/2021 - present  Ibrance, zoladex, and anastrozole.  5/17/2021 radiofrequency ablation, kyphoplasty to L4  8/20/2021  Bilateral salpingo-oophorectomies, Ibrance and anastrozole.  She was off anastrozole 12/2021 - 2/2022 and off Ibrance 01/2022 - 02/2022 due to stress and impending homelessness. Off both again 03/2022-04/2022 due to death of her son but restarted in 05/2022.    Interval History:  Teresa is seen in routine follow-up. She has had intermittent dizziness (room spinning + lightheaded sensation) for 2-3 weeks now. Occurs with position changes or reaching above her head. She has had some L sided mid neck pain. Muscle feels tight there. Her back pain  is stable. She has had more stress with moving to her own place and financially making this work. Back pain is manageable at this time with MS Contin once daily and tylenol.     No SOB or CP with dizziness. She woke up on the floor one day at the onset of symptoms and is not sure if she rolled out of bed while sleeping or if she fainted. No fevers/chills. Had some nausea at first but now this has resolved.     Eating and drinking well. No fevers/chills, congestion, cough, hearing changes. Bowels and bladder have been okay.        Past Medical/Surgical History:   Past Medical History:   Diagnosis Date     Anxiety      Breast CA (H) 12/2020     Depression      DVT (deep venous thrombosis) (H) 2014     Left breast mass     x approximately 4-5 months     Pulmonary emboli (H)      Pyelonephritis      Schizoaffective disorder (H)      Tobacco use      Past Surgical History:   Procedure Laterality Date     COLONOSCOPY N/A 7/8/2022    Procedure: COLONOSCOPY, WITH POLYPECTOMY;  Surgeon: Ham Cano MD;  Location: [facility] [place] [place]     IR LUMBAR KYPHOPLASTY VERTEBRAE  5/17/2021     LAPAROSCOPIC SALPINGO-OOPHORECTOMY Bilateral 8/20/2021    Procedure: BILATERAL SALPINGO-OOPHORECTOMY, LAPAROSCOPIC;  Surgeon: Rory Lopez MD;  Location: [facility] OR     TUBAL LIGATION  1998        Allergies   Allergen Reactions     Contrast Dye      Pt developed nausea after isovue 370 injection on 6/9/21        Current Outpatient Medications   Medication     anastrozole (ARIMIDEX) 1 MG tablet     anastrozole (ARIMIDEX) 1 MG tablet     Benzocaine-Menthol (CEPACOL) 15-2.3 MG LOZG     gabapentin (NEURONTIN) 300 MG capsule     guaiFENesin-codeine (GUAIFENESIN AC) 100-10 MG/5ML syrup     methocarbamol (ROBAXIN) 500 MG tablet     methylPREDNISolone (MEDROL) 32 MG tablet     methylPREDNISolone (MEDROL) 32 MG tablet     morphine (MS CONTIN) 15 MG CR tablet     naloxone (NARCAN) 4 MG/0.1ML nasal spray     nicotine (COMMIT) 2 MG lozenge     nicotine  "(NICORETTE) 2 MG gum     ondansetron (ZOFRAN) 8 MG tablet     palbociclib (IBRANCE) 125 MG tablet     palbociclib (IBRANCE) 125 MG tablet     pantoprazole (PROTONIX) 40 MG EC tablet     polyethylene glycol (MIRALAX) 17 GM/Dose powder     prochlorperazine (COMPAZINE) 10 MG tablet     QUEtiapine (SEROQUEL) 100 MG tablet     rivaroxaban ANTICOAGULANT (XARELTO) 20 MG TABS tablet     SENNA-docusate sodium (SENNA S) 8.6-50 MG tablet     sennosides (SENOKOT) 8.6 MG tablet     sertraline (ZOLOFT) 100 MG tablet     sertraline (ZOLOFT) 50 MG tablet     tolnaftate (TINACTIN) 1 % external cream     Vitamin D3 (CHOLECALCIFEROL) 25 mcg (1000 units) tablet     No current facility-administered medications for this visit.   '  Physical Exam:   BP (!) 144/95   Pulse 75   Temp 98.6  F (37  C)   Resp 18   Wt 112.9 kg (249 lb)   SpO2 100%   BMI 33.77 kg/m    General:  Very pleasant. Alert and oriented x 3.  HEENT:  Normocephalic.  Sclera anicteric. Enlarged thyroid on exam.  MMM.  Lymph:  No palpable cervical, supraclavicular, or axillary LAD.  Chest:  CTA bilaterally.    CV:  RRR.   Abd:  Soft/NT/ND. + BS   Ext:  No edema of the bilateral lower extremities.    Musculo: Tender \"knot\" L mid neck posteriorly   Neuro:  Cranial nerves grossly intact.   Psych:  Mood and affect appear normal.    Laboratory/Imaging Studies:    01/05/23 13:01   Sodium 141   Potassium 3.5   Chloride 106   Carbon Dioxide (CO2) 24   Urea Nitrogen 9.9   Creatinine 0.85   GFR Estimate 85   Calcium 9.3   Anion Gap 11   Albumin 4.0   Protein Total 6.9   Alkaline Phosphatase 83   ALT 8 (L)   AST 11   Bilirubin Total 0.2   Cholesterol 150   Glucose 124 (H)   HDL Cholesterol 58   LDL Cholesterol Calculated 69   Non HDL Cholesterol 92   Triglycerides 114   WBC 3.4 (L)   Hemoglobin 10.7 (L)   Hematocrit 32.2 (L)   Platelet Count 154   RBC Count 3.22 (L)      MCH 33.2 (H)   MCHC 33.2   RDW 14.2   % Neutrophils 51   % Lymphocytes 39   % Monocytes 8   % " Eosinophils 1   % Basophils 1   Absolute Basophils 0.0   Absolute Eosinophils 0.0   Absolute Immature Granulocytes 0.0   Absolute Lymphocytes 1.3   Absolute Monocytes 0.3   % Immature Granulocytes 0   Absolute Neutrophils 1.8   Absolute NRBCs 0.0   NRBCs per 100 WBC 0         ASSESSMENT/PLAN:   47 year old female with history of DVT and ER positive left breast cancer metastasized to bones.    1.  Metastatic breast cancer: Most recent PET/CT on 8/12/2022 shows ongoing response to treatment.  - Continue palbociclib and anastrozole. Tolerating well. Tumor markers have been stable.   - PET and Dr. Plunkett next month.   - At time of progression would consider next line treatment with abemaciclib and fulvestrant.    2. Dizziness, neck pain: MRI C-spine was without cancer progression with some DDD changes. Okay to manage conservatively. She missed brain MRI, with ongoing dizziness will schedule. Will schedule echo as well with possible fainting episode.  -BP okay. Fairly recent EKG looked okay. Lytes okay.   -Call if any recurrent syncope.      3.  Schizoaffective disorder, bipolar type: She is on treatment with Zoloft and Zyprexa.  Ongoing follow up with psychiatry. Zyprexa dose has been increased recently.     4.  Bone metastases/back pain:  She is s/p XRT to the lumbar spine and kyphoplasty of L4.  She was taking MS contin 15 mg PO BID and oxycodone and tylenol PRN.  She also takes methocarbamol prn muscle spasms.   - In process of weaning opioids: She stopped oxycodone on her own over the last month. Taking MS Contin 15 mg about once daily. Will continue this for now. Pain is manageable at this time.   - On Zometa since 1/18/2021 and is receiving once every 3 months. Due next mid February.     4.  DVT:  She confirms that she has continued on Xarelto.  Recommend continuing Xarelto until contraindicated given metastatic disease.      5.  Enlarged thyroid: Thyroid tests WNL last month. PET in 08/2022 showed a metabolic  focus in L lobe. US thyroid showed TIRADS 3 lesion in left superior lobe. FNA showed atypical cells. Repeat FNA scheduled for 1/23.     Greater than 40 minutes was spent with this patient with greater than 20 minutes spent in counseling and coordination of care.    Alee Yang PA-C

## 2023-01-03 NOTE — PATIENT INSTRUCTIONS
**For crisis resources, please see the information at the end of this document**   Patient Education    Thank you for coming to the [facility] [place] [facility].     Lab Testing:  If you had lab testing today and your results are reassuring or normal they will be mailed to you or sent through [employer] within 7 days. If the lab tests need quick action we will call you with the results. The phone number we will call with results is # [phone]. If this is not the best number please call our clinic and change the number.     Medication Refills:  If you need any refills please call your pharmacy and they will contact us. Our fax number for refills is [phone].   Three business days of notice are needed for general medication refill requests.   Five business days of notice are needed for controlled substance refill requests.   If you need to change to a different pharmacy, please contact the new pharmacy directly. The new pharmacy will help you get your medications transferred.     Contact Us:  Please call [phone] during business hours (8-5:00 M-F).   If you have medication related questions after clinic hours, or on the weekend, please call [phone].     Financial Assistance [phone]   Medical Records [phone]       MENTAL HEALTH CRISIS RESOURCES:  For a emergency help, please call 911 or go to the nearest Emergency Department.     Emergency Walk-In Options:   EmPATH Unit @ [facility] ([place]): [phone] - Specialized mental health emergency area designed to be calming  [facility] ([place]): [phone]  [facility] ([place]): [phone]  [facility]): [phone]    [facility] Crisis Information:   [place]: [phone]  [place]: [phone]  [place] ([place]) - Adult: [phone]     Child: [phone]  [place] - Adult: [phone]     Child: [phone]  [place]:  [phone]  List of all [facility] resources:   https://[place].gov/dhs/people-we-serve/adults/health-care/mental-health/resources/crisis-contacts.jsp    National Crisis Information:   Crisis Text Line: Text  [place]  to 821267  Suicide & Crisis Lifeline: 988  National Suicide Prevention Lifeline: 7-217-705-TALK (1-761.917.3318)       For online chat options, visit https://suicidepreventionlifeline.org/chat/  Poison Control Center: 3-948-482-1482  Trans Lifeline: 4-391-471-7272 - Hotline for transgender people of all ages  [employer]: 2-849-005-7612 - Hotline for LGBT youth     For Non-Emergency Support:   Fast Tracker: Mental Health & Substance Use Disorder Resources -   https://www.[employer].org/

## 2023-01-05 ENCOUNTER — ONCOLOGY VISIT (OUTPATIENT)
Dept: ONCOLOGY | Facility: CLINIC | Age: 48
End: 2023-01-05
Attending: PHYSICIAN ASSISTANT
Payer: MEDICARE

## 2023-01-05 ENCOUNTER — APPOINTMENT (OUTPATIENT)
Dept: LAB | Facility: CLINIC | Age: 48
End: 2023-01-05
Attending: PHYSICIAN ASSISTANT
Payer: MEDICARE

## 2023-01-05 VITALS
DIASTOLIC BLOOD PRESSURE: 95 MMHG | SYSTOLIC BLOOD PRESSURE: 144 MMHG | OXYGEN SATURATION: 100 % | HEART RATE: 75 BPM | BODY MASS INDEX: 33.77 KG/M2 | RESPIRATION RATE: 18 BRPM | TEMPERATURE: 98.6 F | WEIGHT: 249 LBS

## 2023-01-05 DIAGNOSIS — Z91.041 ALLERGIC TO IV CONTRAST: ICD-10-CM

## 2023-01-05 DIAGNOSIS — Z79.899 ENCOUNTER FOR LONG-TERM (CURRENT) USE OF MEDICATIONS: ICD-10-CM

## 2023-01-05 DIAGNOSIS — G89.3 CANCER ASSOCIATED PAIN: ICD-10-CM

## 2023-01-05 DIAGNOSIS — I82.432 ACUTE DEEP VEIN THROMBOSIS (DVT) OF POPLITEAL VEIN OF LEFT LOWER EXTREMITY (H): ICD-10-CM

## 2023-01-05 DIAGNOSIS — C79.51 CARCINOMA OF LEFT BREAST METASTATIC TO BONE (H): ICD-10-CM

## 2023-01-05 DIAGNOSIS — C50.919 METASTATIC BREAST CANCER: Primary | ICD-10-CM

## 2023-01-05 DIAGNOSIS — Z51.11 ENCOUNTER FOR ANTINEOPLASTIC CHEMOTHERAPY: ICD-10-CM

## 2023-01-05 DIAGNOSIS — C50.812 MALIGNANT NEOPLASM OF OVERLAPPING SITES OF LEFT BREAST IN FEMALE, ESTROGEN RECEPTOR POSITIVE (H): ICD-10-CM

## 2023-01-05 DIAGNOSIS — C50.912 CARCINOMA OF LEFT BREAST METASTATIC TO BONE (H): ICD-10-CM

## 2023-01-05 DIAGNOSIS — Z17.0 MALIGNANT NEOPLASM OF OVERLAPPING SITES OF LEFT BREAST IN FEMALE, ESTROGEN RECEPTOR POSITIVE (H): ICD-10-CM

## 2023-01-05 DIAGNOSIS — R42 DIZZINESS: ICD-10-CM

## 2023-01-05 LAB
ALBUMIN SERPL BCG-MCNC: 4 G/DL (ref 3.5–5.2)
ALP SERPL-CCNC: 83 U/L (ref 35–104)
ALT SERPL W P-5'-P-CCNC: 8 U/L (ref 10–35)
ANION GAP SERPL CALCULATED.3IONS-SCNC: 11 MMOL/L (ref 7–15)
AST SERPL W P-5'-P-CCNC: 11 U/L (ref 10–35)
BASOPHILS # BLD AUTO: 0 10E3/UL (ref 0–0.2)
BASOPHILS NFR BLD AUTO: 1 %
BILIRUB SERPL-MCNC: 0.2 MG/DL
BUN SERPL-MCNC: 9.9 MG/DL (ref 6–20)
CALCIUM SERPL-MCNC: 9.3 MG/DL (ref 8.6–10)
CANCER AG15-3 SERPL-ACNC: 40 U/ML
CEA SERPL-MCNC: 2.9 NG/ML
CHLORIDE SERPL-SCNC: 106 MMOL/L (ref 98–107)
CHOLEST SERPL-MCNC: 150 MG/DL
CREAT SERPL-MCNC: 0.85 MG/DL (ref 0.51–0.95)
DEPRECATED HCO3 PLAS-SCNC: 24 MMOL/L (ref 22–29)
EOSINOPHIL # BLD AUTO: 0 10E3/UL (ref 0–0.7)
EOSINOPHIL NFR BLD AUTO: 1 %
ERYTHROCYTE [DISTWIDTH] IN BLOOD BY AUTOMATED COUNT: 14.2 % (ref 10–15)
GFR SERPL CREATININE-BSD FRML MDRD: 85 ML/MIN/1.73M2
GLUCOSE SERPL-MCNC: 124 MG/DL (ref 70–99)
HBA1C MFR BLD: 6.4 %
HCT VFR BLD AUTO: 32.2 % (ref 35–47)
HDLC SERPL-MCNC: 58 MG/DL
HGB BLD-MCNC: 10.7 G/DL (ref 11.7–15.7)
IMM GRANULOCYTES # BLD: 0 10E3/UL
IMM GRANULOCYTES NFR BLD: 0 %
LDLC SERPL CALC-MCNC: 69 MG/DL
LYMPHOCYTES # BLD AUTO: 1.3 10E3/UL (ref 0.8–5.3)
LYMPHOCYTES NFR BLD AUTO: 39 %
MCH RBC QN AUTO: 33.2 PG (ref 26.5–33)
MCHC RBC AUTO-ENTMCNC: 33.2 G/DL (ref 31.5–36.5)
MCV RBC AUTO: 100 FL (ref 78–100)
MONOCYTES # BLD AUTO: 0.3 10E3/UL (ref 0–1.3)
MONOCYTES NFR BLD AUTO: 8 %
NEUTROPHILS # BLD AUTO: 1.8 10E3/UL (ref 1.6–8.3)
NEUTROPHILS NFR BLD AUTO: 51 %
NONHDLC SERPL-MCNC: 92 MG/DL
NRBC # BLD AUTO: 0 10E3/UL
NRBC BLD AUTO-RTO: 0 /100
PLATELET # BLD AUTO: 154 10E3/UL (ref 150–450)
POTASSIUM SERPL-SCNC: 3.5 MMOL/L (ref 3.4–5.3)
PROT SERPL-MCNC: 6.9 G/DL (ref 6.4–8.3)
RBC # BLD AUTO: 3.22 10E6/UL (ref 3.8–5.2)
SODIUM SERPL-SCNC: 141 MMOL/L (ref 136–145)
TRIGL SERPL-MCNC: 114 MG/DL
WBC # BLD AUTO: 3.4 10E3/UL (ref 4–11)

## 2023-01-05 PROCEDURE — 80061 LIPID PANEL: CPT | Performed by: PHYSICIAN ASSISTANT

## 2023-01-05 PROCEDURE — 86300 IMMUNOASSAY TUMOR CA 15-3: CPT

## 2023-01-05 PROCEDURE — G0463 HOSPITAL OUTPT CLINIC VISIT: HCPCS

## 2023-01-05 PROCEDURE — 36415 COLL VENOUS BLD VENIPUNCTURE: CPT | Performed by: PHYSICIAN ASSISTANT

## 2023-01-05 PROCEDURE — 99215 OFFICE O/P EST HI 40 MIN: CPT | Performed by: PHYSICIAN ASSISTANT

## 2023-01-05 PROCEDURE — 83036 HEMOGLOBIN GLYCOSYLATED A1C: CPT | Performed by: PHYSICIAN ASSISTANT

## 2023-01-05 PROCEDURE — 80053 COMPREHEN METABOLIC PANEL: CPT | Performed by: PHYSICIAN ASSISTANT

## 2023-01-05 PROCEDURE — 82378 CARCINOEMBRYONIC ANTIGEN: CPT | Performed by: PHYSICIAN ASSISTANT

## 2023-01-05 PROCEDURE — 85025 COMPLETE CBC W/AUTO DIFF WBC: CPT | Performed by: PHYSICIAN ASSISTANT

## 2023-01-05 PROCEDURE — G0463 HOSPITAL OUTPT CLINIC VISIT: HCPCS | Performed by: PHYSICIAN ASSISTANT

## 2023-01-05 RX ORDER — METHYLPREDNISOLONE 32 MG/1
TABLET ORAL
Qty: 2 TABLET | Refills: 3 | Status: SHIPPED | OUTPATIENT
Start: 2023-01-05 | End: 2023-02-12

## 2023-01-05 RX ORDER — METHOCARBAMOL 500 MG/1
500 TABLET, FILM COATED ORAL 3 TIMES DAILY PRN
Qty: 10 TABLET | Refills: 0 | Status: SHIPPED | OUTPATIENT
Start: 2023-01-05 | End: 2023-03-16

## 2023-01-05 RX ORDER — ANASTROZOLE 1 MG/1
1 TABLET ORAL DAILY
Qty: 90 TABLET | Refills: 3 | Status: SHIPPED | OUTPATIENT
Start: 2023-01-05 | End: 2023-02-13

## 2023-01-05 ASSESSMENT — PAIN SCALES - GENERAL: PAINLEVEL: MODERATE PAIN (5)

## 2023-01-05 NOTE — LETTER
1/5/2023         RE: Teresa Sanderson  [address] [address]      Oncology Visit:   Date on this visit: Jan 5, 2023    Diagnosis:  ER positive left breast cancer metastasized to bones.     Primary Physician: No Ref-Primary, Physician     History Of Present Illness:    Ms. Sanderson is a 47 year old female with a h/o tobacco abuse and DVTs with left breast cancer metastasized to bone. She presented to [facility] ED with back pain on 12/5/2020. MRI of the L-spine showed an abnormal L4 lesion with associated right paraspinal mass, abnormalities in L5 and the left iliac bone were also seen. CT C/A/P showed a left breast mass, lytic lesions of T7, L4, and the pelvis, and a 3 cm lesion in the kidney (thought to be a cyst). Ultrasound of the bilateral lower extremities showed a non-occlusive thrombus in the left popliteal vein. Mammogram and ultrasound of the bilateral breasts on 12/17/2020 showed a spiculated mass measuring at least 7.8 cm at 12-1:00 left breast extending from the nipple to 9 cm from the nipple with associated nipple retraction. This mass was biopsied, and showed IDC with surrounding DCIS, grade 3, ER+ 90%, and PA+ 75%.  HER2 was equivocal in approximately 35% of tumor cells by FISH and was negative by IHC.    Metastatic Breast Cancer Treatment:  12/23/2021 - 1/7/2021  Radiation (3000 cGy) to the lumbar spine.  1/29/2021 - present  Ibrance, zoladex, and anastrozole.  5/17/2021 radiofrequency ablation, kyphoplasty to L4  8/20/2021  Bilateral salpingo-oophorectomies, Ibrance and anastrozole.  She was off anastrozole 12/2021 - 2/2022 and off Ibrance 01/2022 - 02/2022 due to stress and impending homelessness. Off both again 03/2022-04/2022 due to death of her son but restarted in 05/2022.    Interval History:  Teresa is seen in routine follow-up. She has had intermittent dizziness (room spinning + lightheaded sensation) for 2-3 weeks now. Occurs with position changes or reaching above  her head. She has had some L sided mid neck pain. Muscle feels tight there. Her back pain is stable. She has had more stress with moving to her own place and financially making this work. Back pain is manageable at this time with MS Contin once daily and tylenol.     No SOB or CP with dizziness. She woke up on the floor one day at the onset of symptoms and is not sure if she rolled out of bed while sleeping or if she fainted. No fevers/chills. Had some nausea at first but now this has resolved.     Eating and drinking well. No fevers/chills, congestion, cough, hearing changes. Bowels and bladder have been okay.        Past Medical/Surgical History:   Past Medical History:   Diagnosis Date     Anxiety      Breast CA (H) 12/2020     Depression      DVT (deep venous thrombosis) (H) 2014     Left breast mass     x approximately 4-5 months     Pulmonary emboli (H)      Pyelonephritis      Schizoaffective disorder (H)      Tobacco use      Past Surgical History:   Procedure Laterality Date     COLONOSCOPY N/A 7/8/2022    Procedure: COLONOSCOPY, WITH POLYPECTOMY;  Surgeon: Ham Cano MD;  [place] [facility] [place]     IR LUMBAR KYPHOPLASTY VERTEBRAE  5/17/2021     LAPAROSCOPIC SALPINGO-OOPHORECTOMY Bilateral 8/20/2021    Procedure: BILATERAL SALPINGO-OOPHORECTOMY, LAPAROSCOPIC;  Surgeon: Rory Lopez MD;  [place] [facility] [place]     TUBAL LIGATION  1998        Allergies   Allergen Reactions     Contrast Dye      Pt developed nausea after isovue 370 injection on 6/9/21        Current Outpatient Medications   Medication     anastrozole (ARIMIDEX) 1 MG tablet     anastrozole (ARIMIDEX) 1 MG tablet     Benzocaine-Menthol (CEPACOL) 15-2.3 MG LOZG     gabapentin (NEURONTIN) 300 MG capsule     guaiFENesin-codeine (GUAIFENESIN AC) 100-10 MG/5ML syrup     methocarbamol (ROBAXIN) 500 MG tablet     methylPREDNISolone (MEDROL) 32 MG tablet     methylPREDNISolone (MEDROL) 32 MG tablet     morphine (MS CONTIN) 15 MG CR tablet      "naloxone (NARCAN) 4 MG/0.1ML nasal spray     nicotine (COMMIT) 2 MG lozenge     nicotine (NICORETTE) 2 MG gum     ondansetron (ZOFRAN) 8 MG tablet     palbociclib (IBRANCE) 125 MG tablet     palbociclib (IBRANCE) 125 MG tablet     pantoprazole (PROTONIX) 40 MG EC tablet     polyethylene glycol (MIRALAX) 17 GM/Dose powder     prochlorperazine (COMPAZINE) 10 MG tablet     QUEtiapine (SEROQUEL) 100 MG tablet     rivaroxaban ANTICOAGULANT (XARELTO) 20 MG TABS tablet     SENNA-docusate sodium (SENNA S) 8.6-50 MG tablet     sennosides (SENOKOT) 8.6 MG tablet     sertraline (ZOLOFT) 100 MG tablet     sertraline (ZOLOFT) 50 MG tablet     tolnaftate (TINACTIN) 1 % external cream     Vitamin D3 (CHOLECALCIFEROL) 25 mcg (1000 units) tablet     No current facility-administered medications for this visit.   '  Physical Exam:   BP (!) 144/95   Pulse 75   Temp 98.6  F (37  C)   Resp 18   Wt 112.9 kg (249 lb)   SpO2 100%   BMI 33.77 kg/m    General:  Very pleasant. Alert and oriented x 3.  HEENT:  Normocephalic.  Sclera anicteric. Enlarged thyroid on exam.  MMM.  Lymph:  No palpable cervical, supraclavicular, or axillary LAD.  Chest:  CTA bilaterally.    CV:  RRR.   Abd:  Soft/NT/ND. + BS   Ext:  No edema of the bilateral lower extremities.    Musculo: Tender \"knot\" L mid neck posteriorly   Neuro:  Cranial nerves grossly intact.   Psych:  Mood and affect appear normal.    Laboratory/Imaging Studies:    01/05/23 13:01   Sodium 141   Potassium 3.5   Chloride 106   Carbon Dioxide (CO2) 24   Urea Nitrogen 9.9   Creatinine 0.85   GFR Estimate 85   Calcium 9.3   Anion Gap 11   Albumin 4.0   Protein Total 6.9   Alkaline Phosphatase 83   ALT 8 (L)   AST 11   Bilirubin Total 0.2   Cholesterol 150   Glucose 124 (H)   HDL Cholesterol 58   LDL Cholesterol Calculated 69   Non HDL Cholesterol 92   Triglycerides 114   WBC 3.4 (L)   Hemoglobin 10.7 (L)   Hematocrit 32.2 (L)   Platelet Count 154   RBC Count 3.22 (L)      MCH 33.2 (H) "   MCHC 33.2   RDW 14.2   % Neutrophils 51   % Lymphocytes 39   % Monocytes 8   % Eosinophils 1   % Basophils 1   Absolute Basophils 0.0   Absolute Eosinophils 0.0   Absolute Immature Granulocytes 0.0   Absolute Lymphocytes 1.3   Absolute Monocytes 0.3   % Immature Granulocytes 0   Absolute Neutrophils 1.8   Absolute NRBCs 0.0   NRBCs per 100 WBC 0         ASSESSMENT/PLAN:   47 year old female with history of DVT and ER positive left breast cancer metastasized to bones.    1.  Metastatic breast cancer: Most recent PET/CT on 8/12/2022 shows ongoing response to treatment.  - Continue palbociclib and anastrozole. Tolerating well. Tumor markers have been stable.   - PET and Dr. Plunkett next month.   - At time of progression would consider next line treatment with abemaciclib and fulvestrant.    2. Dizziness, neck pain: MRI C-spine was without cancer progression with some DDD changes. Okay to manage conservatively. She missed brain MRI, with ongoing dizziness will schedule. Will schedule echo as well with possible fainting episode.  -BP okay. Fairly recent EKG looked okay. Lytes okay.   -Call if any recurrent syncope.      3.  Schizoaffective disorder, bipolar type: She is on treatment with Zoloft and Zyprexa.  Ongoing follow up with psychiatry. Zyprexa dose has been increased recently.     4.  Bone metastases/back pain:  She is s/p XRT to the lumbar spine and kyphoplasty of L4.  She was taking MS contin 15 mg PO BID and oxycodone and tylenol PRN.  She also takes methocarbamol prn muscle spasms.   - In process of weaning opioids: She stopped oxycodone on her own over the last month. Taking MS Contin 15 mg about once daily. Will continue this for now. Pain is manageable at this time.   - On Zometa since 1/18/2021 and is receiving once every 3 months. Due next mid February.     4.  DVT:  She confirms that she has continued on Xarelto.  Recommend continuing Xarelto until contraindicated given metastatic disease.      5.   Enlarged thyroid: Thyroid tests WNL last month. PET in 08/2022 showed a metabolic focus in L lobe. US thyroid showed TIRADS 3 lesion in left superior lobe. FNA showed atypical cells. Repeat FNA scheduled for 1/23.     Greater than 40 minutes was spent with this patient with greater than 20 minutes spent in counseling and coordination of care.    RICHARD Kim PA-C

## 2023-01-05 NOTE — NURSING NOTE
Chief Complaint   Patient presents with     Labs Only     Venipuncture, vitals checked     Karma Martinez RN on 1/5/2023 at 1:02 PM

## 2023-01-05 NOTE — NURSING NOTE
Oncology Rooming Note    January 5, 2023 1:16 PM   Teresa Sanderson is a 47 year old female who presents for:    Chief Complaint   Patient presents with     Labs Only     Venipuncture, vitals checked     Oncology Clinic Visit     Metastatic breast cancer      Initial Vitals: BP (!) 144/95   Pulse 75   Temp 98.6  F (37  C)   Resp 18   Wt 112.9 kg (249 lb)   SpO2 100%   BMI 33.77 kg/m   Estimated body mass index is 33.77 kg/m  as calculated from the following:    Height as of 12/16/22: 1.829 m (6').    Weight as of this encounter: 112.9 kg (249 lb). Body surface area is 2.39 meters squared.  Moderate Pain (5) Comment: Data Unavailable   No LMP recorded. (Menstrual status: Tubal ).  Allergies reviewed: Yes  Medications reviewed: Yes    Medications: MEDICATION REFILLS NEEDED TODAY. Provider was notified.  Pharmacy name entered into EPIC:    [facility] - [place], [place] - [address] [place] [address] [facility]  [id number] - [place], [place] - [address] AT [facility] - [place], [place] - [address]  [facility]IV[facility] - [place], [place] - [facility] [place] [address]  [facility] - [place], [place] - [address] [facility] [facility][id number] - [place], [place] - [address] AT [facility] #[id number] - [place], [place] - [address]    Clinical concerns: needs refill for blood thinners and anastrozole  Trey was notified.      Hayden Guy

## 2023-01-07 LAB — CANCER AG27-29 SERPL-ACNC: 45.6 U/ML

## 2023-01-23 ENCOUNTER — ANCILLARY PROCEDURE (OUTPATIENT)
Dept: ULTRASOUND IMAGING | Facility: CLINIC | Age: 48
End: 2023-01-23
Attending: PHYSICIAN ASSISTANT
Payer: MEDICARE

## 2023-01-23 DIAGNOSIS — E04.1 THYROID NODULE: ICD-10-CM

## 2023-01-23 DIAGNOSIS — R89.9 ABNORMAL THYROID BIOPSY: ICD-10-CM

## 2023-01-23 PROCEDURE — 10005 FNA BX W/US GDN 1ST LES: CPT | Mod: 52 | Performed by: STUDENT IN AN ORGANIZED HEALTH CARE EDUCATION/TRAINING PROGRAM

## 2023-01-23 PROCEDURE — 88173 CYTOPATH EVAL FNA REPORT: CPT | Mod: 26 | Performed by: PATHOLOGY

## 2023-01-23 PROCEDURE — 88173 CYTOPATH EVAL FNA REPORT: CPT | Mod: TC | Performed by: INTERNAL MEDICINE

## 2023-01-23 RX ORDER — LIDOCAINE HYDROCHLORIDE 10 MG/ML
5 INJECTION, SOLUTION INFILTRATION; PERINEURAL ONCE
Status: COMPLETED | OUTPATIENT
Start: 2023-01-23 | End: 2023-01-23

## 2023-01-23 RX ADMIN — LIDOCAINE HYDROCHLORIDE 5 ML: 10 INJECTION, SOLUTION INFILTRATION; PERINEURAL at 10:36

## 2023-01-24 LAB
PATH REPORT.COMMENTS IMP SPEC: NORMAL
PATH REPORT.COMMENTS IMP SPEC: NORMAL
PATH REPORT.FINAL DX SPEC: NORMAL
PATH REPORT.GROSS SPEC: NORMAL
PATH REPORT.MICROSCOPIC SPEC OTHER STN: NORMAL
PATH REPORT.RELEVANT HX SPEC: NORMAL

## 2023-01-26 ENCOUNTER — ANCILLARY PROCEDURE (OUTPATIENT)
Dept: MRI IMAGING | Facility: CLINIC | Age: 48
End: 2023-01-26
Attending: INTERNAL MEDICINE
Payer: MEDICARE

## 2023-01-26 DIAGNOSIS — R42 DIZZINESS: ICD-10-CM

## 2023-01-26 DIAGNOSIS — C50.919 METASTATIC BREAST CANCER: ICD-10-CM

## 2023-01-26 PROCEDURE — A9585 GADOBUTROL INJECTION: HCPCS | Performed by: RADIOLOGY

## 2023-01-26 PROCEDURE — G1010 CDSM STANSON: HCPCS | Performed by: RADIOLOGY

## 2023-01-26 PROCEDURE — 70553 MRI BRAIN STEM W/O & W/DYE: CPT | Mod: MG | Performed by: RADIOLOGY

## 2023-01-26 RX ORDER — GADOBUTROL 604.72 MG/ML
15 INJECTION INTRAVENOUS ONCE
Status: COMPLETED | OUTPATIENT
Start: 2023-01-26 | End: 2023-01-26

## 2023-01-26 RX ADMIN — GADOBUTROL 11 ML: 604.72 INJECTION INTRAVENOUS at 11:59

## 2023-01-29 ENCOUNTER — HEALTH MAINTENANCE LETTER (OUTPATIENT)
Age: 48
End: 2023-01-29

## 2023-02-05 DIAGNOSIS — C79.51 CARCINOMA OF LEFT BREAST METASTATIC TO BONE (H): Primary | ICD-10-CM

## 2023-02-05 DIAGNOSIS — Z17.0 MALIGNANT NEOPLASM OF OVERLAPPING SITES OF LEFT BREAST IN FEMALE, ESTROGEN RECEPTOR POSITIVE (H): ICD-10-CM

## 2023-02-05 DIAGNOSIS — C50.912 CARCINOMA OF LEFT BREAST METASTATIC TO BONE (H): Primary | ICD-10-CM

## 2023-02-05 DIAGNOSIS — C50.812 MALIGNANT NEOPLASM OF OVERLAPPING SITES OF LEFT BREAST IN FEMALE, ESTROGEN RECEPTOR POSITIVE (H): ICD-10-CM

## 2023-02-08 ENCOUNTER — TELEPHONE (OUTPATIENT)
Dept: ONCOLOGY | Facility: CLINIC | Age: 48
End: 2023-02-08
Payer: MEDICARE

## 2023-02-08 NOTE — TELEPHONE ENCOUNTER
PA Initiation    Medication[facility]   Insurance Company: [employer] - Phone [phone] Fax [phone]  Pharmacy Filling the Rx: [facility] - [place] [address] [address]  Filling Pharmacy Phone:    Filling Pharmacy Fax:    Start Date: 2/8/2023

## 2023-02-10 NOTE — PROGRESS NOTES
This encounter was opened in error. Please disregard.    Patient was called by VF to room for appt. Per their conversation, she was aware that she had missed many appointments with various teams lately. She initially stated she could do our visit, however, at appt time, VF called to check on her status for logging in and she stated something had come up and she needed to reschedule.    Patient had reconnected with Palliative care in order to help wean off opioids. She has reportedly been taking only 15mg of MSContin daily, however, her last MSContin fill was in October 2022. There was another script on file, however, it doesn't appear this was every filled. If she was taking in consistently, she would have been out by now. Given then additional refills of MSContin are likely not needed. If she did need an opioid for severe breakthrough pain, could resume oxycodone 5mg daily.    If she would like to continue following with Palliative, we would certainly be happy to see her. However, right now it seems that she is having trouble keeping up with all of her appointments, and she may get more benefit from focusing on keeping her appointments with Oncology and Psychiatry.    Palliative Care Progress Note    Patient Name: Alconiris Kin  Primary Provider: No Ref-Primary, Physician    Patient ID:   Medical - She has metastatic breast cancer dx 12/2020; widespread bone mets. S/p RT to lumbar spine and RFA/kyphoplasty L4 5/2021.   11/2022 on Ibrance and anastrazole since 1/2021 (with some treatment interruptions). Long discussion about voluntary opioid tapering 11/15/22 palliative visit.      She has schizoaffective disorder (bipolar type); followed by psychiatry at the .    Last Palliative care appointment: 12/16/22 with Dr. Diaz. Had discssued tapering opioids. She had not gotten refills of her oxycodone and was doing fine. Recommended OK to continue MSContin at 15mg daily to let her body adjust.      Reviewed:  Yes    ORT Score = 5 due to family hx of drug use and schizoaffective hx    Interim History:  Teresa Sanderson is a 47 year old female who is seen today for follow up with Palliative Care via billable video visit.     It looks like she no showed her most recently scheduled Psychiatry appt. She also no showed Oncology yesterday .          Social History:  Was living with cousin and son. On SSD. 5 adult children. Housing insecure. Daughter murdered in . Son  Spring 2022. Worked as a  for [employer] before cancer dx, in [place].   No LOYD hx  Social History     Tobacco Use     Smoking status: Every Day     Packs/day: 0.25     Types: Cigarettes     Start date: 2011     Smokeless tobacco: Never   Substance Use Topics     Alcohol use: Not Currently     Comment: occ     Drug use: Yes     Types: Marijuana     Comment: occ       Family History- Reviewed in Epic.    Allergies   Allergen Reactions     Contrast Dye      Pt developed nausea after isovue 370 injection on 21        Medications- Reviewed in Epic.    Past Medical History- Reviewed in [facility].    Past Surgical History- Reviewed in Epic.    Review of Systems:   ROS: 10 point ROS neg other than the symptoms noted above in the HPI.      Key Data Reviewed:  LABS: 23- Cr 0.85, GFR 85, Albumin 4, LFTs essentially wnl. WBC 3.4, Hgb 10.7, Plts 154. Cancer antigen 15-3 of 40 (stable), CA 27.29 of 45.6 (stable), CEA 2.9.      Impression & Recommendations & Counseling:  Teresa Sanderson is a 47 year old female with history of metastatic breast cancer under good control on palbociclib and pain.         Ayanna Tellez,   Palliative Medicine   Pager [phone], AMCOM ID [id number]    Some chart documentation performed using Dragon Voice recognition Software. Although reviewed after completion, some words and grammatical errors may remain.

## 2023-02-13 DIAGNOSIS — C79.51 CARCINOMA OF LEFT BREAST METASTATIC TO BONE (H): Primary | ICD-10-CM

## 2023-02-13 DIAGNOSIS — Z17.0 MALIGNANT NEOPLASM OF OVERLAPPING SITES OF LEFT BREAST IN FEMALE, ESTROGEN RECEPTOR POSITIVE (H): ICD-10-CM

## 2023-02-13 DIAGNOSIS — C50.912 CARCINOMA OF LEFT BREAST METASTATIC TO BONE (H): Primary | ICD-10-CM

## 2023-02-13 DIAGNOSIS — C50.812 MALIGNANT NEOPLASM OF OVERLAPPING SITES OF LEFT BREAST IN FEMALE, ESTROGEN RECEPTOR POSITIVE (H): ICD-10-CM

## 2023-02-13 RX ORDER — ANASTROZOLE 1 MG/1
1 TABLET ORAL DAILY
Qty: 90 TABLET | Refills: 3 | Status: SHIPPED | OUTPATIENT
Start: 2023-02-13 | End: 2023-04-19 | Stop reason: ALTCHOICE

## 2023-02-14 ENCOUNTER — VIRTUAL VISIT (OUTPATIENT)
Dept: PALLIATIVE CARE | Facility: CLINIC | Age: 48
End: 2023-02-14
Attending: STUDENT IN AN ORGANIZED HEALTH CARE EDUCATION/TRAINING PROGRAM
Payer: MEDICARE

## 2023-02-14 DIAGNOSIS — Z53.9 ERRONEOUS ENCOUNTER--DISREGARD: Primary | ICD-10-CM

## 2023-02-14 PROCEDURE — 10000001 PR ERRONEOUS ENCOUNTER--DISREGARD: Mod: VID | Performed by: STUDENT IN AN ORGANIZED HEALTH CARE EDUCATION/TRAINING PROGRAM

## 2023-02-14 NOTE — LETTER
Date:February 16, 2023      Provider requested that no letter be sent. Do not send.       [facility]

## 2023-02-14 NOTE — LETTER
2/14/2023       RE: Teresa Sanderson  [address] [address]     Dear Colleague,    Thank you for [facility] your [facility]erson[facility]. Please see a copy of my visit note below.    This encounter was opened in error. Please disregard.    Patient was called by VF to room for appt. Per their conversation, she was aware that she had missed many appointments with various teams lately. She initially stated she could do our visit, however, at appt time, VF called to check on her status for logging in and she stated something had come up and she needed to reschedule.    Patient had reconnected with Palliative care in order to help wean off opioids. She has reportedly been taking only 15mg of MSContin daily, however, her last MSContin fill was in October 2022. There was another script on file, however, it doesn't appear this was every filled. If she was taking in consistently, she would have been out by now. Given then additional refills of MSContin are likely not needed. If she did need an opioid for severe breakthrough pain, could resume oxycodone 5mg daily.    If she would like to continue following with Palliative, we would certainly be happy to see her. However, right now it seems that she is having trouble keeping up with all of her appointments, and she may get more benefit from focusing on keeping her appointments with Oncology and Psychiatry.    Palliative Care Progress Note    Patient Name: Teresa Sanderson  Primary Provider: No Ref-Primary, Physician    Patient ID:   Medical - She has metastatic breast cancer dx 12/2020; widespread bone mets. S/p RT to lumbar spine and RFA/kyphoplasty L4 5/2021.   11/2022 on Ibrance and anastrazole since 1/2021 (with some treatment interruptions). Long discussion about voluntary opioid tapering 11/15/22 palliative visit.      She has schizoaffective  disorder (bipolar type); followed by psychiatry at the .    Last Palliative care appointment: 22 with Dr. Diaz. Had discssued tapering opioids. She had not gotten refills of her oxycodone and was doing fine. Recommended OK to continue MSContin at 15mg daily to let her body adjust.      Reviewed: Yes    ORT Score = 5 due to family hx of drug use and schizoaffective hx    Interim History:  Teresa Sanderson is a 47 year old female who is seen today for follow up with Palliative Care via billable video visit.     It looks like she no showed her most recently scheduled Psychiatry appt. She also no showed Oncology yesterday .          Social History:  Was living with cousin and son. On SSD. 5 adult children. Housing insecure. Daughter murdered in . Son  Spring 2022. Worked as a  for [employer] before cancer dx, in [place].   No LOYD hx  Social History     Tobacco Use     Smoking status: Every Day     Packs/day: 0.25     Types: Cigarettes     Start date: 2011     Smokeless tobacco: Never   Substance Use Topics     Alcohol use: Not Currently     Comment: occ     Drug use: Yes     Types: Marijuana     Comment: occ       Family History- Reviewed in Epic.    Allergies   Allergen Reactions     Contrast Dye      Pt developed nausea after isovue 370 injection on 21        Medications- Reviewed in Epic.    Past Medical History- Reviewed in [facility].    Past Surgical History- Reviewed in Epic.    Review of Systems:   ROS: 10 point ROS neg other than the symptoms noted above in the HPI.      Key Data Reviewed:  LABS: 23- Cr 0.85, GFR 85, Albumin 4, LFTs essentially wnl. WBC 3.4, Hgb 10.7, Plts 154. Cancer antigen 15-3 of 40 (stable), CA 27.29 of 45.6 (stable), CEA 2.9.      Impression & Recommendations & Counseling:  Teresa Sanderson is a 47 year old female with history of metastatic breast cancer under good control on palbociclib and pain.         Ayanna Tellez,  DO  Palliative Medicine   Pager [phone], AMCOM ID [id number]    Some chart documentation performed using Dragon Voice recognition Software. Although reviewed after completion, some words and grammatical errors may remain.        Again, thank you for allowing me to participate in the care of your patient.      Sincerely,    Ayanna Tellez DO

## 2023-02-14 NOTE — NURSING NOTE
Is the patient currently in the state of [place]? YES    Visit mode:VIDEO    If the visit is dropped, the patient can be reconnected by: VIDEO VISIT: Text to cell phone: [phone]    Will anyone else be joining the visit? NO      How would you like to obtain your AVS? MyChart    Are changes needed to the allergy or medication list? NO    Comments or concerns regarding today's visit: no

## 2023-03-15 NOTE — PROGRESS NOTES
Oncology Visit:   Date on this visit: Mar 16, 2023    Diagnosis:  ER positive left breast cancer metastasized to bones.     Primary Physician: No Ref-Primary, Physician     History Of Present Illness:    Ms. Sanderson is a 47 year old female with a h/o tobacco abuse and DVTs with left breast cancer metastasized to bone. She presented to [facility] with back pain on 12/5/2020. MRI of the L-spine showed an abnormal L4 lesion with associated right paraspinal mass, abnormalities in L5 and the left iliac bone were also seen. CT C/A/P showed a left breast mass, lytic lesions of T7, L4, and the pelvis, and a 3 cm lesion in the kidney (thought to be a cyst). Ultrasound of the bilateral lower extremities showed a non-occlusive thrombus in the left popliteal vein. Mammogram and ultrasound of the bilateral breasts on 12/17/2020 showed a spiculated mass measuring at least 7.8 cm at 12-1:00 left breast extending from the nipple to 9 cm from the nipple with associated nipple retraction. This mass was biopsied, and showed IDC with surrounding DCIS, grade 3, ER+ 90%, and W[place]+ 75%.  HER2 was equivocal in approximately 35% of tumor cells by FISH and was negative by IHC.    Metastatic Breast Cancer Treatment:  12/23/2021 - 1/7/2021  Radiation (3000 cGy) to the lumbar spine.  1/29/2021 - present  Ibrance, zoladex, and anastrozole.  5/17/2021 radiofrequency ablation, kyphoplasty to L4  8/20/2021  Bilateral salpingo-oophorectomies, Ibrance and anastrozole.  She was off anastrozole 12/2021 - 2/2022 and off Ibrance 01/2022 - 02/2022 due to stress and impending homelessness. Off both again 03/2022-04/2022 due to death of her son but restarted in 05/2022.    Interval History: Teresa presents in follow-up of metastatic breast cancer. She missed appointments last month due to worsening depression. She is planning to get back on track now. She has had L sided groin pain in the past which we have worked up with a CT scan in November. This pain  "improved but has now been worse the past several weeks. The pain is along her \"panty line\" and does not radiate. It is constant. Feels best when supine. Worse with walking on it for longer periods. She has had some episodes of her leg feeling weaker like it wanted to give out. No n/t. No edema. Back pain has been stable. Neck pain has been better. Of note, she has been out of MS Contin and methocarbomol for a while and has been using ibuprofen or Aleve or tylenol to manage pain along with continuing with her gabapentin. She has been resting a lot in bed due to this. Her son questions if this is \"mental pain\" as her mental health is not in the best place.     Her energy level has been poor. Still has occasional dizziness but not as often. No n/v. Has some blurry vision-no diplopia. Is going to make an appt with the eye doctor.     No abdominal pain, bowels okay. Eating and drinking well. No lumps/bumps.     She has consistently been taking xarelto, ibrance, and anastrozole.        Past Medical/Surgical History:   Past Medical History:   Diagnosis Date     Anxiety      Breast CA (H) 12/2020     Depression      DVT (deep venous thrombosis) (H) 2014     Left breast mass     x approximately 4-5 months     Pulmonary emboli (H)      Pyelonephritis      Schizoaffective disorder (H)      Tobacco use      Past Surgical History:   Procedure Laterality Date     COLONOSCOPY N/A 7/8/2022    Procedure: COLONOSCOPY, WITH POLYPECTOMY;  Surgeon: Ham Cano MD;  [place] [facility] [place]     [place] LUMBAR KYPHOPLASTY VERTEBRAE  5/17/2021     LAPAROSCOPIC SALPINGO-OOPHORECTOMY Bilateral 8/20/2021    Procedure: BILATERAL SALPINGO-OOPHORECTOMY, LAPAROSCOPIC;  Surgeon: Rory Lopez MD;  [place] [facility] [place]     TUBAL LIGATION  1998        Allergies   Allergen Reactions     Contrast Dye      Pt developed nausea after isovue 370 injection on 6/9/21        Current Outpatient Medications   Medication     methocarbamol (ROBAXIN) 500 MG " tablet     morphine (MS CONTIN) 15 MG CR tablet     anastrozole (ARIMIDEX) 1 MG tablet     gabapentin (NEURONTIN) 300 MG capsule     naloxone (NARCAN) 4 MG/0.1ML nasal spray     nicotine (COMMIT) 2 MG lozenge     nicotine (NICORETTE) 2 MG gum     ondansetron (ZOFRAN) 8 MG tablet     palbociclib (IBRANCE) 125 MG tablet     pantoprazole (PROTONIX) 40 MG EC tablet     polyethylene glycol (MIRALAX) 17 GM/Dose powder     prochlorperazine (COMPAZINE) 10 MG tablet     QUEtiapine (SEROQUEL) 100 MG tablet     rivaroxaban ANTICOAGULANT (XARELTO) 20 MG TABS tablet     SENNA-docusate sodium (SENNA S) 8.6-50 MG tablet     sertraline (ZOLOFT) 100 MG tablet     sertraline (ZOLOFT) 50 MG tablet     tolnaftate (TINACTIN) 1 % external cream     Vitamin D3 (CHOLECALCIFEROL) 25 mcg (1000 units) tablet     No current facility-administered medications for this visit.   '  Physical Exam:   /82 (BP Location: Right arm, Patient Position: Sitting, Cuff Size: Adult Regular)   Pulse 81   Temp 98  F (36.7  C) (Oral)   Wt 109.8 kg (242 lb 1.6 oz)   SpO2 98%   BMI 32.83 kg/m     Wt Readings from Last 4 Encounters:   03/16/23 109.8 kg (242 lb 1.6 oz)   01/05/23 112.9 kg (249 lb)   12/16/22 111.1 kg (245 lb)   11/14/22 115.1 kg (253 lb 11.2 oz)   General:  Very pleasant. Alert and oriented x 3.  HEENT:  Normocephalic.  Sclera anicteric. Enlarged thyroid on exam.  MMM.  Lymph:  No palpable cervical, supraclavicular, or axillary LAD. No inguinal LAD.   Chest:  CTA bilaterally.    CV:  RRR.   Abd:  Soft/NT/ND. + BS   Ext:  No edema of the bilateral lower extremities.    Musculo: No spinal tenderness. TTP L groin. No pain to lateral left hip. Strength and sensation intact.   Neuro:  Cranial nerves grossly intact.   Psych:  Mood and affect appear normal.    Laboratory/Imaging Studies:    Latest Reference Range & Units 03/16/23 16:50   WBC 4.0 - 11.0 10e3/uL 3.3 (L) (P)   Hemoglobin 11.7 - 15.7 g/dL 11.6 (L) (P)   Hematocrit 35.0 - 47.0 % 34.5  (L) (P)   Platelet Count 150 - 450 10e3/uL 233 (P)   RBC Count 3.80 - 5.20 10e6/uL 3.37 (L) (P)   MCV 78 - 100 fL 102 (H) (P)   MCH 26.5 - 33.0 pg 34.4 (H) (P)   MCHC 31.5 - 36.5 g/dL 33.6 (P)   RDW 10.0 - 15.0 % 15.5 (H) (P)         ASSESSMENT/PLAN:   47 year old female with history of DVT and ER positive left breast cancer metastasized to bones.    1.  Metastatic breast cancer: Most recent PET/CT on 8/12/2022 with stable disease.  Of note, she was scheduled for repeat PET/CT on 2/10, however, did not show for the appointment. With her worsening L groin pain will reschedule ASAP. Asked her to WBAT with a walker in the meantime. If pain with weight bearing gets worse would need to evaluate soon with plain films additionally. TTP makes me less suspicious for bone met process in groin area.   - Continue palbociclib and anastrozole.    - Continue to monitor monthly tumor markers--drawn today. Were stable in January.   - Will plan for follow-up after PET.   - At time of progression would consider next line treatment with abemaciclib and fulvestrant.    2. Dizziness, neck pain: MRI brain 1/26/2023 was without evidence of metastasis.  MRI C-spine showed DDD, no cancer.   - No occurring as frequently. Neck pain better. Most likely drug effect.     3.  Schizoaffective disorder, bipolar type: She is on treatment with Zoloft and Seroquel.  Ongoing follow up with psychiatry.     4.  Bone metastases/back pain:  She is s/p XRT to the lumbar spine and kyphoplasty of L4.  She is taking MS contin 15 mg PO daily and tylenol PRN.  She also takes methocarbamol prn muscle spasms.  - Continue to work on weaning off opioids but will refill same dosing for now. If PET is in continued CR, will need to consider pain clinic referral or establish with palliative care again.   - Receiving Zometa once every 3 months.  She is due for a dose of Zometa at this time--will request this.     5.  DVT:  She continues on Xarelto.    6.  Enlarged  thyroid: Biopsy on 1/23 c/w thyroiditis.    Greater than 40 minutes was spent with this patient with greater than 20 minutes spent in counseling and coordination of care.    Alee Yang PA-C

## 2023-03-16 ENCOUNTER — ONCOLOGY VISIT (OUTPATIENT)
Dept: ONCOLOGY | Facility: CLINIC | Age: 48
End: 2023-03-16
Attending: PHYSICIAN ASSISTANT
Payer: MEDICARE

## 2023-03-16 VITALS
TEMPERATURE: 98 F | HEART RATE: 81 BPM | WEIGHT: 242.1 LBS | BODY MASS INDEX: 32.83 KG/M2 | DIASTOLIC BLOOD PRESSURE: 82 MMHG | OXYGEN SATURATION: 98 % | SYSTOLIC BLOOD PRESSURE: 120 MMHG

## 2023-03-16 DIAGNOSIS — C50.912 CARCINOMA OF LEFT BREAST METASTATIC TO BONE (H): Primary | ICD-10-CM

## 2023-03-16 DIAGNOSIS — Z51.11 ENCOUNTER FOR ANTINEOPLASTIC CHEMOTHERAPY: ICD-10-CM

## 2023-03-16 DIAGNOSIS — G89.3 CANCER ASSOCIATED PAIN: ICD-10-CM

## 2023-03-16 DIAGNOSIS — C79.51 CARCINOMA OF LEFT BREAST METASTATIC TO BONE (H): Primary | ICD-10-CM

## 2023-03-16 DIAGNOSIS — E06.3 HASHIMOTO'S THYROIDITIS: ICD-10-CM

## 2023-03-16 LAB
ALBUMIN SERPL BCG-MCNC: 4.6 G/DL (ref 3.5–5.2)
ALP SERPL-CCNC: 116 U/L (ref 35–104)
ALT SERPL W P-5'-P-CCNC: 11 U/L (ref 10–35)
ANION GAP SERPL CALCULATED.3IONS-SCNC: 14 MMOL/L (ref 7–15)
AST SERPL W P-5'-P-CCNC: 14 U/L (ref 10–35)
BASOPHILS # BLD MANUAL: 0 10E3/UL (ref 0–0.2)
BASOPHILS NFR BLD MANUAL: 0 %
BILIRUB SERPL-MCNC: 0.2 MG/DL
BUN SERPL-MCNC: 16.8 MG/DL (ref 6–20)
CALCIUM SERPL-MCNC: 10.1 MG/DL (ref 8.6–10)
CANCER AG15-3 SERPL-ACNC: 51 U/ML
CEA SERPL-MCNC: 2.9 NG/ML
CHLORIDE SERPL-SCNC: 108 MMOL/L (ref 98–107)
CREAT SERPL-MCNC: 0.98 MG/DL (ref 0.51–0.95)
DEPRECATED HCO3 PLAS-SCNC: 21 MMOL/L (ref 22–29)
EOSINOPHIL # BLD MANUAL: 0.1 10E3/UL (ref 0–0.7)
EOSINOPHIL NFR BLD MANUAL: 2 %
ERYTHROCYTE [DISTWIDTH] IN BLOOD BY AUTOMATED COUNT: 15.5 % (ref 10–15)
GFR SERPL CREATININE-BSD FRML MDRD: 71 ML/MIN/1.73M2
GLUCOSE SERPL-MCNC: 113 MG/DL (ref 70–99)
HCT VFR BLD AUTO: 34.5 % (ref 35–47)
HGB BLD-MCNC: 11.6 G/DL (ref 11.7–15.7)
LYMPHOCYTES # BLD MANUAL: 1.5 10E3/UL (ref 0.8–5.3)
LYMPHOCYTES NFR BLD MANUAL: 45 %
MCH RBC QN AUTO: 34.4 PG (ref 26.5–33)
MCHC RBC AUTO-ENTMCNC: 33.6 G/DL (ref 31.5–36.5)
MCV RBC AUTO: 102 FL (ref 78–100)
MONOCYTES # BLD MANUAL: 0.1 10E3/UL (ref 0–1.3)
MONOCYTES NFR BLD MANUAL: 4 %
NEUTROPHILS # BLD MANUAL: 1.6 10E3/UL (ref 1.6–8.3)
NEUTROPHILS NFR BLD MANUAL: 49 %
PLAT MORPH BLD: NORMAL
PLATELET # BLD AUTO: 233 10E3/UL (ref 150–450)
POTASSIUM SERPL-SCNC: 4.2 MMOL/L (ref 3.4–5.3)
PROT SERPL-MCNC: 8.1 G/DL (ref 6.4–8.3)
RBC # BLD AUTO: 3.37 10E6/UL (ref 3.8–5.2)
RBC MORPH BLD: NORMAL
SODIUM SERPL-SCNC: 143 MMOL/L (ref 136–145)
TSH SERPL DL<=0.005 MIU/L-ACNC: 0.8 UIU/ML (ref 0.3–4.2)
WBC # BLD AUTO: 3.3 10E3/UL (ref 4–11)

## 2023-03-16 PROCEDURE — 84443 ASSAY THYROID STIM HORMONE: CPT | Performed by: PHYSICIAN ASSISTANT

## 2023-03-16 PROCEDURE — 85027 COMPLETE CBC AUTOMATED: CPT | Performed by: PHYSICIAN ASSISTANT

## 2023-03-16 PROCEDURE — 86300 IMMUNOASSAY TUMOR CA 15-3: CPT | Performed by: PHYSICIAN ASSISTANT

## 2023-03-16 PROCEDURE — 36415 COLL VENOUS BLD VENIPUNCTURE: CPT | Performed by: PHYSICIAN ASSISTANT

## 2023-03-16 PROCEDURE — 85007 BL SMEAR W/DIFF WBC COUNT: CPT | Performed by: PHYSICIAN ASSISTANT

## 2023-03-16 PROCEDURE — 99215 OFFICE O/P EST HI 40 MIN: CPT | Performed by: PHYSICIAN ASSISTANT

## 2023-03-16 PROCEDURE — 80053 COMPREHEN METABOLIC PANEL: CPT | Performed by: PHYSICIAN ASSISTANT

## 2023-03-16 PROCEDURE — 82378 CARCINOEMBRYONIC ANTIGEN: CPT | Performed by: PHYSICIAN ASSISTANT

## 2023-03-16 PROCEDURE — G0463 HOSPITAL OUTPT CLINIC VISIT: HCPCS | Performed by: PHYSICIAN ASSISTANT

## 2023-03-16 RX ORDER — METHOCARBAMOL 500 MG/1
500 TABLET, FILM COATED ORAL 3 TIMES DAILY PRN
Qty: 30 TABLET | Refills: 0 | Status: SHIPPED | OUTPATIENT
Start: 2023-03-16 | End: 2023-04-19

## 2023-03-16 RX ORDER — MORPHINE SULFATE 15 MG/1
15 TABLET, FILM COATED, EXTENDED RELEASE ORAL DAILY
Qty: 60 TABLET | Refills: 0 | Status: SHIPPED | OUTPATIENT
Start: 2023-03-16 | End: 2023-04-19

## 2023-03-16 ASSESSMENT — PAIN SCALES - GENERAL: PAINLEVEL: EXTREME PAIN (8)

## 2023-03-16 NOTE — NURSING NOTE
Oncology Rooming Note    March 16, 2023 4:02 PM   Teresa Sanderson is a 47 year old female who presents for:    Chief Complaint   Patient presents with     Oncology Clinic Visit     Malignant neoplasm of overlapping sites of left breast     Initial Vitals: /82 (BP Location: Right arm, Patient Position: Sitting, Cuff Size: Adult Regular)   Pulse 81   Temp 98  F (36.7  C) (Oral)   Wt 109.8 kg (242 lb 1.6 oz)   SpO2 98%   BMI 32.83 kg/m   Estimated body mass index is 32.83 kg/m  as calculated from the following:    Height as of 12/16/22: 1.829 m (6').    Weight as of this encounter: 109.8 kg (242 lb 1.6 oz). Body surface area is 2.36 meters squared.  Extreme Pain (8) Comment: Data Unavailable   No LMP recorded. (Menstrual status: Tubal ).  Allergies reviewed: Yes  Medications reviewed: Yes    Medications: Medication refills not needed today.  Pharmacy name entered into [facility]:    [id number] - [place], [place] - [address] AT [facility] [address]  [facility] - [place], [place] - [address] [facility]  [id number] - [place] - [address] [place] [address] [address] AT [id number] - [place], [place] - [address] [place] [address] BLVD AT [address] [place] [address] & [facility] - [place], [place] - [address] [place] [address]    Clinical concerns: would like to talk about taking pain meds. Pt states she is feeling intense pain, says the pain is interfering with sleep and ability to walk.    Hayden Guy

## 2023-03-16 NOTE — NURSING NOTE
Patient labs drawn in clinic via venipuncture. Patient tolerated well and was discharged. Specimen(s) sent to first floor lab for processing. See flowsheet for details.      Aisha Daly CMA on 3/16/2023 at 4:56 PM

## 2023-03-16 NOTE — LETTER
3/16/2023         RE: Teresa Sanderson  [address] [facility] [id number]      Oncology Visit:   Date on this visit: Mar 16, 2023    Diagnosis:  ER positive left breast cancer metastasized to bones.     Primary Physician: No Ref-Primary, Physician     History Of Present Illness:    Ms. Sanderson is a 47 year old female with a h/o tobacco abuse and DVTs with left breast cancer metastasized to bone. She presented to [facility] ED with back pain on 12/5/2020. MRI of the L-spine showed an abnormal L4 lesion with associated right paraspinal mass, abnormalities in L5 and the left iliac bone were also seen. CT C/A/P showed a left breast mass, lytic lesions of T7, L4, and the pelvis, and a 3 cm lesion in the kidney (thought to be a cyst). Ultrasound of the bilateral lower extremities showed a non-occlusive thrombus in the left popliteal vein. Mammogram and ultrasound of the bilateral breasts on 12/17/2020 showed a spiculated mass measuring at least 7.8 cm at 12-1:00 left breast extending from the nipple to 9 cm from the nipple with associated nipple retraction. This mass was biopsied, and showed IDC with surrounding DCIS, grade 3, ER+ 90%, and MT+ 75%.  HER2 was equivocal in approximately 35% of tumor cells by FISH and was negative by IHC.    Metastatic Breast Cancer Treatment:  12/23/2021 - 1/7/2021  Radiation (3000 cGy) to the lumbar spine.  1/29/2021 - present  Ibrance, zoladex, and anastrozole.  5/17/2021 radiofrequency ablation, kyphoplasty to L4  8/20/2021  Bilateral salpingo-oophorectomies, Ibrance and anastrozole.  She was off anastrozole 12/2021 - 2/2022 and off Ibrance 01/2022 - 02/2022 due to stress and impending homelessness. Off both again 03/2022-04/2022 due to death of her son but restarted in 05/2022.    Interval History: Teresa presents in follow-up of metastatic breast cancer. She missed appointments last month due to worsening depression. She is planning to get back on track now. She has had L  "sided groin pain in the past which we have worked up with a CT scan in November. This pain improved but has now been worse the past several weeks. The pain is along her \"panty line\" and does not radiate. It is constant. Feels best when supine. Worse with walking on it for longer periods. She has had some episodes of her leg feeling weaker like it wanted to give out. No n/t. No edema. Back pain has been stable. Neck pain has been better. Of note, she has been out of MS Contin and methocarbomol for a while and has been using ibuprofen or Aleve or tylenol to manage pain along with continuing with her gabapentin. She has been resting a lot in bed due to this. Her son questions if this is \"mental pain\" as her mental health is not in the best place.     Her energy level has been poor. Still has occasional dizziness but not as often. No n/v. Has some blurry vision-no diplopia. Is going to make an appt with the eye doctor.     No abdominal pain, bowels okay. Eating and drinking well. No lumps/bumps.     She has consistently been taking xarelto, ibrance, and anastrozole.        Past Medical/Surgical History:   Past Medical History:   Diagnosis Date     Anxiety      Breast CA (H) 12/2020     Depression      DVT (deep venous thrombosis) (H) 2014     Left breast mass     x approximately 4-5 months     Pulmonary emboli (H)      Pyelonephritis      Schizoaffective disorder (H)      Tobacco use      Past Surgical History:   Procedure Laterality Date     COLONOSCOPY N/A 7/8/2022    Procedure: COLONOSCOPY, WITH POLYPECTOMY;  Surgeon: Ham Cano MD;  [place] [facility] [place]     IR LUMBAR KYPHOPLASTY VERTEBRAE  5/17/2021     LAPAROSCOPIC SALPINGO-OOPHORECTOMY Bilateral 8/20/2021    Procedure: BILATERAL SALPINGO-OOPHORECTOMY, LAPAROSCOPIC;  Surgeon: Rory Lopez MD;  Location: [facility] [place]     TUBAL LIGATION  1998        Allergies   Allergen Reactions     Contrast Dye      Pt developed nausea after isovue 370 injection on 6/9/21  "       Current Outpatient Medications   Medication     methocarbamol (ROBAXIN) 500 MG tablet     morphine (MS CONTIN) 15 MG CR tablet     anastrozole (ARIMIDEX) 1 MG tablet     gabapentin (NEURONTIN) 300 MG capsule     naloxone (NARCAN) 4 MG/0.1ML nasal spray     nicotine (COMMIT) 2 MG lozenge     nicotine (NICORETTE) 2 MG gum     ondansetron (ZOFRAN) 8 MG tablet     palbociclib (IBRANCE) 125 MG tablet     pantoprazole (PROTONIX) 40 MG EC tablet     polyethylene glycol (MIRALAX) 17 GM/Dose powder     prochlorperazine (COMPAZINE) 10 MG tablet     QUEtiapine (SEROQUEL) 100 MG tablet     rivaroxaban ANTICOAGULANT (XARELTO) 20 MG TABS tablet     SENNA-docusate sodium (SENNA S) 8.6-50 MG tablet     sertraline (ZOLOFT) 100 MG tablet     sertraline (ZOLOFT) 50 MG tablet     tolnaftate (TINACTIN) 1 % external cream     Vitamin D3 (CHOLECALCIFEROL) 25 mcg (1000 units) tablet     No current facility-administered medications for this visit.   '  Physical Exam:   /82 (BP Location: Right arm, Patient Position: Sitting, Cuff Size: Adult Regular)   Pulse 81   Temp 98  F (36.7  C) (Oral)   Wt 109.8 kg (242 lb 1.6 oz)   SpO2 98%   BMI 32.83 kg/m     Wt Readings from Last 4 Encounters:   03/16/23 109.8 kg (242 lb 1.6 oz)   01/05/23 112.9 kg (249 lb)   12/16/22 111.1 kg (245 lb)   11/14/22 115.1 kg (253 lb 11.2 oz)   General:  Very pleasant. Alert and oriented x 3.  HEENT:  Normocephalic.  Sclera anicteric. Enlarged thyroid on exam.  MMM.  Lymph:  No palpable cervical, supraclavicular, or axillary LAD. No inguinal LAD.   Chest:  CTA bilaterally.    CV:  RRR.   Abd:  Soft/NT/ND. + BS   Ext:  No edema of the bilateral lower extremities.    Musculo: No spinal tenderness. TTP L groin. No pain to lateral left hip. Strength and sensation intact.   Neuro:  Cranial nerves grossly intact.   Psych:  Mood and affect appear normal.    Laboratory/Imaging Studies:    Latest Reference Range & Units 03/16/23 16:50   WBC 4.0 - 11.0 10e3/uL  3.3 (L) (P)   Hemoglobin 11.7 - 15.7 g/dL 11.6 (L) (P)   Hematocrit 35.0 - 47.0 % 34.5 (L) (P)   Platelet Count 150 - 450 10e3/uL 233 (P)   RBC Count 3.80 - 5.20 10e6/uL 3.37 (L) (P)   MCV 78 - 100 fL 102 (H) (P)   MCH 26.5 - 33.0 pg 34.4 (H) (P)   MCHC 31.5 - 36.5 g/dL 33.6 (P)   RDW 10.0 - 15.0 % 15.5 (H) (P)         ASSESSMENT/PLAN:   47 year old female with history of DVT and ER positive left breast cancer metastasized to bones.    1.  Metastatic breast cancer: Most recent PET/CT on 8/12/2022 with stable disease.  Of note, she was scheduled for repeat PET/CT on 2/10, however, did not show for the appointment. With her worsening L groin pain will reschedule ASAP. Asked her to WBAT with a walker in the meantime. If pain with weight bearing gets worse would need to evaluate soon with plain films additionally. TTP makes me less suspicious for bone met process in groin area.   - Continue palbociclib and anastrozole.    - Continue to monitor monthly tumor markers--drawn today. Were stable in January.   - Will plan for follow-up after PET.   - At time of progression would consider next line treatment with abemaciclib and fulvestrant.    2. Dizziness, neck pain: MRI brain 1/26/2023 was without evidence of metastasis.  MRI C-spine showed DDD, no cancer.   - No occurring as frequently. Neck pain better. Most likely drug effect.     3.  Schizoaffective disorder, bipolar type: She is on treatment with Zoloft and Seroquel.  Ongoing follow up with psychiatry.     4.  Bone metastases/back pain:  She is s/p XRT to the lumbar spine and kyphoplasty of L4.  She is taking MS contin 15 mg PO daily and tylenol PRN.  She also takes methocarbamol prn muscle spasms.  - Continue to work on weaning off opioids but will refill same dosing for now. If PET is in continued CR, will need to consider pain clinic referral or establish with palliative care again.   - Receiving Zometa once every 3 months.  She is due for a dose of Zometa at this  time--will request this.     5.  DVT:  She continues on Xarelto.    6.  Enlarged thyroid: Biopsy on 1/23 c/w thyroiditis.    Greater than 40 minutes was spent with this patient with greater than 20 minutes spent in counseling and coordination of care.    RICHARD Kim PA-C

## 2023-03-23 ENCOUNTER — TELEPHONE (OUTPATIENT)
Dept: ONCOLOGY | Facility: CLINIC | Age: 48
End: 2023-03-23
Payer: MEDICARE

## 2023-03-23 NOTE — TELEPHONE ENCOUNTER
Was receiving Morphine sulfate 15mg twice a day.     New prescription is for Morphine sulfate 15mg once daily #60 tabs.   Would only be able to fill #30 tabs if the once daily dosing is correct.     Please send new script to verify dosing and # of tabs to fill.

## 2023-03-23 NOTE — TELEPHONE ENCOUNTER
Per Alee Yang: I refilled prior prescription which has been once daily. Once daily is what I intended. Thank you.     Call placed to pharmacy to let them know it is prescribed how intended and they will only fill #30 tabs for a one month fill.

## 2023-03-28 DIAGNOSIS — C50.912 CARCINOMA OF LEFT BREAST METASTATIC TO BONE (H): Primary | ICD-10-CM

## 2023-03-28 DIAGNOSIS — C79.51 CARCINOMA OF LEFT BREAST METASTATIC TO BONE (H): Primary | ICD-10-CM

## 2023-04-03 PROBLEM — C79.51 METASTASIS TO SPINAL COLUMN (H): Status: ACTIVE | Noted: 2020-12-05

## 2023-04-07 DIAGNOSIS — C50.912 CARCINOMA OF LEFT BREAST METASTATIC TO BONE (H): Primary | ICD-10-CM

## 2023-04-07 DIAGNOSIS — C50.812 MALIGNANT NEOPLASM OF OVERLAPPING SITES OF LEFT BREAST IN FEMALE, ESTROGEN RECEPTOR POSITIVE (H): ICD-10-CM

## 2023-04-07 DIAGNOSIS — Z17.0 MALIGNANT NEOPLASM OF OVERLAPPING SITES OF LEFT BREAST IN FEMALE, ESTROGEN RECEPTOR POSITIVE (H): ICD-10-CM

## 2023-04-07 DIAGNOSIS — C79.51 CARCINOMA OF LEFT BREAST METASTATIC TO BONE (H): Primary | ICD-10-CM

## 2023-04-09 NOTE — PROGRESS NOTES
Virtual Visit Details    Type of service:  Video Visit   Video Start Time: 10:15 AM  Video End Time: 11:03 AM    Originating Location (pt. Location): Home  Distant Location (provider location):  Off-site  Platform used for Video Visit: [facility]  MEDICAL PROGRESS NOTE     CARE TEAM:  PCP- Physician No Ref-Primary    Psychotherapist- None   Teresa is a 47 year old who uses the name Teresa and pronouns she, her, hers.      DIAGNOSIS   Schizoaffective disorder, bipolar type (previously diagnosed with bipolar disorder)  Bereavement  Metastatic breast cancer     ASSESSMENT   Teresa states she is not doing well overall. She has been experiencing sleep difficulties, increased anxiety, feeling overwhelmed and a low mood. She has a hard time getting to sleep and is only able to sleep for short periods of time. March is also the anniversary of her son's death.     Issues addressed today are below.    - Medications: Will resume Seroquel. Nurse partner to check in on Thurs or Fri regarding Seroquel and symptoms. I will call early next week to discuss further medication options: increasing Seroquel by 50 mg or starting prazosin 1 mg at bedtime depending on how her symptoms are.      - Therapy: Previously placed referral for individual therapy. Phone number provided. Will also place in AVS and send via [employer]. Encouraged starting therapy to target mood, anxiety and provide support. Will also inquire about an all women's group therapy in the clinic.       - Support services: Messaged oncology, palliative and social work team to inquiring about getting a PCA, [facility] or similar service, [facility] worker and cancer support group and/or child loss grief support.    - MTM: Request psychotropic medication review and if adequate medication trials were done. Will review history and think about other medication options and to optimize psychotropics.      - PCP: Alconiris does  "not have a PCP. Referral placed for internal medicine PCP.    MNPMP was checked today:  Indicates taking controlled medication as prescribed.     PLAN                                                                                                                1) Meds-  Resume Seroquel: 200 mg for two night and then increase back to 300 mg at bedtime  Continue: Zoloft 150 mg daily     Prescribed by Sandra Onc:  morphine (MS CONTIN) 15 MG CR tablet - 15 mg by mouth daily  Gabapentin 600 mg in the morning, 600 mg at 2 pm and 600 mg at bedtime  Robaxin 500 mg TID PRN for muscle spasms     2) Psychotherapy- Referral placed; Teresa given phone number during appointment     3) Next due-  Labs- Yearly SGA 2024  EKG- No routine monitoring needed for current psychotropic regimen.   Rating scales- PHQ9 and GAD7    4) Referrals-  Therapy- Individual and group  MTM- Review psychotropic medication history  Medical Referral- PCP    5) Dispo- RTC in 1 month     PERTINENT BACKGROUND   Copied forward from 22 note by Dr. Hernandez                                                 [most recent eval 10/04/22]   Medical: Diagnosed with L breast cancer in Dec 2020 after presenting with a few mos of  back pain. Mets to L4, L5, left iliac bone as well as paraspinal mass. 2021 started   Ibrance, zoladex, and anastrozole; 21- s/p radiofrequency ablation, keloplasty/  segmental plasty of L4; 2021- showing complete response to treatment.  Psych: Lifetime history of \"rapid mood shifts\". S/P 7-8 hospital[place], [place] [facility] [place]quel started at that time, took 100mg TID x yrs. Details  in eval. Hospitalization [id number] is discussed in eval and documents what sounds like   a full manic episode. CA dx'd 2020. Zoloft started after the cancer dx. Seroquel 100mg   was stopped 2021 (not helping), doxepin and Ambien tried for sleep-neither helped. Son  2022 by accidental " "overdose.   Meds: Seroquel x years 100mg-300mg was helpful but 300mg too sedating & became  less effective over time; Invega for a short period; Zyprexa was helpful; no Haldol or Risperdal;   Lithium did not help (? 2 yrs ago) same for Depakote; trazodone; no HOWELL    Pertinent Items Include: suicidal ideation, psychosis, tom, mutiple   psychotropic trials, trauma hx, violent behavior, psych hosps, cocaine 2019. Last hosp  Nov 2021.     SUBJECTIVE     - Medical update: Oncology visit 03/023 , PET 08/2022 stable L breast uptake  - Cancer treatment: anastrozole (Arimidex) po, palbociclib (Ibrance) po and zoledronic acid (Zometa) INF q 3 months     -  \"Not doing well\"  - Seroquel not working well, missed apts because of feeling depressed  - Worrying, can't sleep, sleeping 15 min to 2 hours  - Out of seroquel 4 days, taking benadryl, tylenol pm, bedtime tea - can not sleep      - Started staying up all night, sleep 6-7am, sleep all day, waking up 3-5pm and got worse until not sleeping  - Anxious and worrying, exhausting   - Worrying about personal problems, health, daughter is experience homelessness with her kids and cant help her, worry about her kids, still grieving   - Hard to get to sleep because of these constant thoughts, mind is so overloaded   - Once she gets to sleep, nothing wakes up her, is in a ball of sweat when wakes up  - Not sure if she is having nightmares      - Episodes of breathing hard, feels like suffocation, can hear her breathe, doesn't know what it is, not doing anything      - Therapy? No, right before March hit, stopped going to doctors, anniversary of son's death (March 2022), laying in bed all day long  - Womens group? Okay - yes willing to    - Son lives with her and is her caretaker     - Labs: Hgb A1C 6.4 (upper pre-diabetes limit), lipid wnl - encouraged PCP follow-up, does not have a PCP    - Dizziness:  Not any more, a couple of dizzy spells, fell out twice, eye sight was bad, slowed " down, last time at least a couple weeks ago  - 4 days since last taken seroquel, can pick it up today, (feels like seroquel stopped working 2 weeks ago, used to take it at 8pm and sleep 1-2 hours)   - Any psychosis symptoms?: No, just heavy thoughts, thinking hard     Recent Substance Use, per last visit:     -alcohol: Occasional wine   -cannabis: Once or twice a month   -tobacco: Yes, 4-5 cigarettes a day; trying to quit  -opioids: Yes: for pain   Narcan Kit currrent: Yes   -other: none     Pertinent Negatives: No suicidal or violent ideation, self-injury, psychosis, tom, hypomania, aggression and harmful substance use  Adverse Effects: Denies     FAMILY and SOCIAL HISTORY        Copied forward from 22 note by Dr. Hernandez       Family Hx:  Multiple relatives with schizophrenia  Social Hx:  Lives with cousin and son, supported by social security disability, has 5 adult   children and 6 grandchildren.   Older history-mom  when pt was 7yo, chaotic childhood and   h/o trauma. [place] lived between [place] and [place] over the years.    PSYCH and SUBSTANCE USE Critical Summary Points since 2022   10/4/22 - transfer visit  10/17/22 - Increased Seroquel to 150 mg at bedtime  2022 - Increased Seroquel to 200 mg at bedtime   2022 - Did not increase Seroquel; continue plan to increase to 200 mg at bedtime   2023- Increase Seroquel to 300 mg at bedtime   4/10/2023 - Resume Seroquel to 300 mg to target sleep   MEDICAL HISTORY and ALLERGY     ALLERGIES: Contrast dye    Patient Active Problem List   Diagnosis     Breast mass     Spine metastasis     Urinary tract infection with hematuria     Malignant neoplasm of overlapping sites of left breast in female, estrogen receptor positive (H)     Carcinoma of left breast metastatic to bone (H)     Metastasis to bone (H)     Pain of right lower leg     Malignant neoplasm of female breast, unspecified estrogen receptor status, unspecified laterality, unspecified site  of breast (H)     Schizoaffective disorder, bipolar type (H)     Insomnia, unspecified type        MEDICAL REVIEW OF SYSTEMS     none in addition to that documented above     MEDICATIONS     Current Outpatient Medications   Medication Sig Dispense Refill     anastrozole (ARIMIDEX) 1 MG tablet Take 1 tablet (1 mg) by mouth daily 90 tablet 3     gabapentin (NEURONTIN) 300 MG capsule Take 2 capsules (600 mg) by mouth every morning AND 2 capsules (600 mg) daily at 2 pm AND 2 capsules (600 mg) At Bedtime. Do all this for 30 days. 180 capsule 2     methocarbamol (ROBAXIN) 500 MG tablet Take 1 tablet (500 mg) by mouth 3 times daily as needed for muscle spasms 30 tablet 0     morphine (MS CONTIN) 15 MG CR tablet Take 1 tablet (15 mg) by mouth daily 60 tablet 0     naloxone (NARCAN) 4 MG/0.1ML nasal spray Spray 1 spray (4 mg) into one nostril alternating nostrils once as needed for opioid reversal every 2-3 minutes until assistance arrives 0.2 mL 0     nicotine (COMMIT) 2 MG lozenge Place 1 lozenge (2 mg) inside cheek every hour as needed for smoking cessation 27 lozenge 3     nicotine (NICORETTE) 2 MG gum Place 1 each (2 mg) inside cheek every hour as needed for smoking cessation 90 each 1     ondansetron (ZOFRAN) 8 MG tablet Take 1 tablet (8 mg) by mouth every 8 hours as needed for nausea 30 tablet 3     palbociclib (IBRANCE) 125 MG tablet Take 1 tablet (125 mg) by mouth daily Take for 21 days, then 7 days off. 21 tablet 2     pantoprazole (PROTONIX) 40 MG EC tablet Take 1 tablet (40 mg) by mouth every morning (before breakfast) 30 tablet 1     polyethylene glycol (MIRALAX) 17 GM/Dose powder Take 17 g by mouth daily as needed for constipation 578 g 3     prochlorperazine (COMPAZINE) 10 MG tablet Take 1 tablet (10 mg) by mouth every 6 hours as needed for nausea or vomiting 30 tablet 3     QUEtiapine (SEROQUEL) 100 MG tablet Take 3 tablets (300 mg) by mouth At Bedtime 90 tablet 1     rivaroxaban ANTICOAGULANT (XARELTO) 20 MG  "TABS tablet Take 1 tablet (20 mg) by mouth daily (with dinner) 90 tablet 3     SENNA-docusate sodium (SENNA S) 8.6-50 MG tablet Take 2 tablets by mouth 2 times daily as needed (Constipation) 100 tablet 3     sertraline (ZOLOFT) 100 MG tablet Take one tab (100mg) by mouth every morning. Take in addition to 50 mg tablet for total dose of 150mg daily 30 tablet 1     sertraline (ZOLOFT) 50 MG tablet Take 1 tablet (50 mg) by mouth daily Take in addition to 100mg tablet for total dose of 150mg daily 30 tablet 1     tolnaftate (TINACTIN) 1 % external cream Apply topically 2 times daily 30 g 1     Vitamin D3 (CHOLECALCIFEROL) 25 mcg (1000 units) tablet Take 1 tablet (25 mcg) by mouth daily 90 tablet 3      VITALS   There were no vitals taken for this visit.    MENTAL STATUS EXAM     Alertness: alert  and oriented  Appearance: casually groomed  Behavior/Demeanor: cooperative, pleasant and calm, with good  eye contact   Speech: normal  Language: intact and no problems  Psychomotor: normal or unremarkable   Mood: \"Not doing well\"  Affect: full range; congruent to: mood- yes, content- yes  Thought Process/Associations: unremarkable  Thought Content:  Reports none;  Denies suicidal ideation  Perception:  Reports none;  Denies auditory hallucinations  Insight: good  Judgment: good  Cognition: does  appear grossly intact; formal cognitive testing was not done  Gait and Station: N/A ([facility])     LABS and DATA         7/6/2022    12:53 PM 10/4/2022    10:02 AM 12/5/2022     8:47 AM   PHQ   PHQ-9 Total Score 15 20 22   Q9: Thoughts of better off dead/self-harm past 2 weeks Not at all Not at all Not at all     Answers for HPI/ROS submitted by the patient on 4/10/2023  If you checked off any problems, how difficult have these problems made it for you to do your work, take care of things at home, or get along with other people?: Somewhat difficult  PHQ9 TOTAL SCORE: 19  JENNIFFER 7 TOTAL SCORE: 21    Recent Labs   Lab Test 03/16/23  1650 " 01/05/23  1301   CR 0.98* 0.85   GFRESTIMATED 71 85     Recent Labs   Lab Test 03/16/23  1650 01/05/23  1301   * 124*   A1C  --  6.4*     Recent Labs   Lab Test 01/05/23  1301 11/27/21  0752   CHOL 150 132   TRIG 114 59   LDL 69 64   HDL 58 56     Recent Labs   Lab Test 03/16/23  1650 01/05/23  1301   AST 14 11   ALT 11 8*   ALKPHOS 116* 83     Recent Labs   Lab Test 03/16/23  1650 01/05/23  1301 11/14/22  0947 10/17/22  1610   WBC 3.3* 3.4*   < > 3.4*   ANEU 1.6  --   --  2.1   HGB 11.6* 10.7*   < > 11.7    154   < > 226    < > = values in this interval not displayed.         ECG 8/10/2021 QTc = 399ms     PSYCHOTROPIC DRUG INTERACTIONS                                                       PSYCHCLINICDDI   Additive sedation, CNS depression: most drugs on this list contribute to this     Additive serotonin: also many drugs on this list but not by way of P450 intxns     Additive QT:  Sertraline and Quetiapine    MANAGEMENT:  Monitoring for adverse effects and periodic EKGs     RISK STATEMENT for SAFETY     Cleaster did not appear to be an imminent safety risk to self or others.    TREATMENT RISK STATEMENT: The risks, benefits, alternatives and potential adverse effects have been discussed and are understood by the pt. The pt understands the risks of using street drugs or alcohol. There are no medical contraindications, the pt agrees to treatment with the ability to do so. The pt knows to call the clinic for any problems or to access emergency care if needed.  Medical and substance use concerns are documented above.  Psychotropic drug interaction check was done, including changes made today.     PSYCHIATRIST: Yasmine Castaneda MD    Patient staffed in clinic with Dr. Tyson who will sign the note.  Supervisor is Dr. Johnson.

## 2023-04-10 ENCOUNTER — VIRTUAL VISIT (OUTPATIENT)
Dept: PSYCHIATRY | Facility: CLINIC | Age: 48
End: 2023-04-10
Attending: PSYCHIATRY & NEUROLOGY
Payer: MEDICARE

## 2023-04-10 ENCOUNTER — APPOINTMENT (OUTPATIENT)
Dept: LAB | Facility: CLINIC | Age: 48
End: 2023-04-10
Attending: PHYSICIAN ASSISTANT
Payer: MEDICARE

## 2023-04-10 ENCOUNTER — TELEPHONE (OUTPATIENT)
Dept: ONCOLOGY | Facility: CLINIC | Age: 48
End: 2023-04-10

## 2023-04-10 ENCOUNTER — INFUSION THERAPY VISIT (OUTPATIENT)
Dept: ONCOLOGY | Facility: CLINIC | Age: 48
End: 2023-04-10
Attending: INTERNAL MEDICINE
Payer: MEDICARE

## 2023-04-10 VITALS
TEMPERATURE: 97.8 F | RESPIRATION RATE: 16 BRPM | HEART RATE: 64 BPM | WEIGHT: 250.2 LBS | BODY MASS INDEX: 33.93 KG/M2 | SYSTOLIC BLOOD PRESSURE: 132 MMHG | OXYGEN SATURATION: 100 % | DIASTOLIC BLOOD PRESSURE: 79 MMHG

## 2023-04-10 DIAGNOSIS — Z17.0 MALIGNANT NEOPLASM OF OVERLAPPING SITES OF LEFT BREAST IN FEMALE, ESTROGEN RECEPTOR POSITIVE (H): ICD-10-CM

## 2023-04-10 DIAGNOSIS — Z63.4 BEREAVEMENT: ICD-10-CM

## 2023-04-10 DIAGNOSIS — C50.912 CARCINOMA OF LEFT BREAST METASTATIC TO BONE (H): Primary | ICD-10-CM

## 2023-04-10 DIAGNOSIS — C50.812 MALIGNANT NEOPLASM OF OVERLAPPING SITES OF LEFT BREAST IN FEMALE, ESTROGEN RECEPTOR POSITIVE (H): ICD-10-CM

## 2023-04-10 DIAGNOSIS — C79.51 CARCINOMA OF LEFT BREAST METASTATIC TO BONE (H): Primary | ICD-10-CM

## 2023-04-10 DIAGNOSIS — F25.0 SCHIZOAFFECTIVE DISORDER, BIPOLAR TYPE (H): Primary | ICD-10-CM

## 2023-04-10 LAB
ALBUMIN SERPL BCG-MCNC: 4.2 G/DL (ref 3.5–5.2)
ALP SERPL-CCNC: 116 U/L (ref 35–104)
ALT SERPL W P-5'-P-CCNC: 13 U/L (ref 10–35)
ANION GAP SERPL CALCULATED.3IONS-SCNC: 9 MMOL/L (ref 7–15)
AST SERPL W P-5'-P-CCNC: 14 U/L (ref 10–35)
BASOPHILS # BLD MANUAL: 0.1 10E3/UL (ref 0–0.2)
BASOPHILS NFR BLD MANUAL: 2 %
BILIRUB SERPL-MCNC: <0.2 MG/DL
BUN SERPL-MCNC: 15.2 MG/DL (ref 6–20)
CALCIUM SERPL-MCNC: 9.6 MG/DL (ref 8.6–10)
CANCER AG15-3 SERPL-ACNC: 44 U/ML
CEA SERPL-MCNC: 2.9 NG/ML
CHLORIDE SERPL-SCNC: 106 MMOL/L (ref 98–107)
CREAT SERPL-MCNC: 1.19 MG/DL (ref 0.51–0.95)
DACRYOCYTES BLD QL SMEAR: SLIGHT
DEPRECATED HCO3 PLAS-SCNC: 25 MMOL/L (ref 22–29)
EOSINOPHIL # BLD MANUAL: 0.1 10E3/UL (ref 0–0.7)
EOSINOPHIL NFR BLD MANUAL: 2 %
ERYTHROCYTE [DISTWIDTH] IN BLOOD BY AUTOMATED COUNT: 15 % (ref 10–15)
FRAGMENTS BLD QL SMEAR: SLIGHT
GFR SERPL CREATININE-BSD FRML MDRD: 56 ML/MIN/1.73M2
GLUCOSE SERPL-MCNC: 127 MG/DL (ref 70–99)
HCT VFR BLD AUTO: 31.9 % (ref 35–47)
HGB BLD-MCNC: 10.7 G/DL (ref 11.7–15.7)
LYMPHOCYTES # BLD MANUAL: 2.1 10E3/UL (ref 0.8–5.3)
LYMPHOCYTES NFR BLD MANUAL: 48 %
MCH RBC QN AUTO: 34 PG (ref 26.5–33)
MCHC RBC AUTO-ENTMCNC: 33.5 G/DL (ref 31.5–36.5)
MCV RBC AUTO: 101 FL (ref 78–100)
MONOCYTES # BLD MANUAL: 0.1 10E3/UL (ref 0–1.3)
MONOCYTES NFR BLD MANUAL: 2 %
NEUTROPHILS # BLD MANUAL: 2 10E3/UL (ref 1.6–8.3)
NEUTROPHILS NFR BLD MANUAL: 46 %
NRBC # BLD AUTO: 0 10E3/UL
NRBC BLD MANUAL-RTO: 1 %
PLAT MORPH BLD: ABNORMAL
PLATELET # BLD AUTO: 259 10E3/UL (ref 150–450)
POTASSIUM SERPL-SCNC: 3.9 MMOL/L (ref 3.4–5.3)
PROT SERPL-MCNC: 7.4 G/DL (ref 6.4–8.3)
RBC # BLD AUTO: 3.15 10E6/UL (ref 3.8–5.2)
RBC MORPH BLD: ABNORMAL
SODIUM SERPL-SCNC: 140 MMOL/L (ref 136–145)
WBC # BLD AUTO: 4.4 10E3/UL (ref 4–11)

## 2023-04-10 PROCEDURE — 80053 COMPREHEN METABOLIC PANEL: CPT

## 2023-04-10 PROCEDURE — 99215 OFFICE O/P EST HI 40 MIN: CPT | Mod: VID | Performed by: STUDENT IN AN ORGANIZED HEALTH CARE EDUCATION/TRAINING PROGRAM

## 2023-04-10 PROCEDURE — 250N000011 HC RX IP 250 OP 636: Performed by: INTERNAL MEDICINE

## 2023-04-10 PROCEDURE — 258N000003 HC RX IP 258 OP 636: Performed by: INTERNAL MEDICINE

## 2023-04-10 PROCEDURE — 82310 ASSAY OF CALCIUM: CPT

## 2023-04-10 PROCEDURE — 86300 IMMUNOASSAY TUMOR CA 15-3: CPT

## 2023-04-10 PROCEDURE — 82378 CARCINOEMBRYONIC ANTIGEN: CPT

## 2023-04-10 PROCEDURE — 85027 COMPLETE CBC AUTOMATED: CPT

## 2023-04-10 PROCEDURE — 96365 THER/PROPH/DIAG IV INF INIT: CPT

## 2023-04-10 PROCEDURE — 36415 COLL VENOUS BLD VENIPUNCTURE: CPT

## 2023-04-10 PROCEDURE — 85007 BL SMEAR W/DIFF WBC COUNT: CPT

## 2023-04-10 RX ORDER — ZOLEDRONIC ACID 0.04 MG/ML
4 INJECTION, SOLUTION INTRAVENOUS ONCE
Status: COMPLETED | OUTPATIENT
Start: 2023-04-10 | End: 2023-04-10

## 2023-04-10 RX ORDER — QUETIAPINE FUMARATE 100 MG/1
300 TABLET, FILM COATED ORAL AT BEDTIME
Qty: 90 TABLET | Refills: 1 | Status: SHIPPED | OUTPATIENT
Start: 2023-04-10 | End: 2023-10-05

## 2023-04-10 RX ORDER — SERTRALINE HYDROCHLORIDE 100 MG/1
TABLET, FILM COATED ORAL
Qty: 30 TABLET | Refills: 1 | Status: SHIPPED | OUTPATIENT
Start: 2023-04-10 | End: 2023-12-08

## 2023-04-10 RX ADMIN — ZOLEDRONIC ACID 4 MG: 0.04 INJECTION, SOLUTION INTRAVENOUS at 12:54

## 2023-04-10 RX ADMIN — SODIUM CHLORIDE 250 ML: 9 INJECTION, SOLUTION INTRAVENOUS at 12:53

## 2023-04-10 SDOH — SOCIAL STABILITY - SOCIAL INSECURITY: DISSAPEARANCE AND DEATH OF FAMILY MEMBER: Z63.4

## 2023-04-10 ASSESSMENT — ANXIETY QUESTIONNAIRES
2. NOT BEING ABLE TO STOP OR CONTROL WORRYING: NEARLY EVERY DAY
5. BEING SO RESTLESS THAT IT IS HARD TO SIT STILL: NEARLY EVERY DAY
GAD7 TOTAL SCORE: 21
4. TROUBLE RELAXING: NEARLY EVERY DAY
7. FEELING AFRAID AS IF SOMETHING AWFUL MIGHT HAPPEN: NEARLY EVERY DAY
7. FEELING AFRAID AS IF SOMETHING AWFUL MIGHT HAPPEN: NEARLY EVERY DAY
3. WORRYING TOO MUCH ABOUT DIFFERENT THINGS: NEARLY EVERY DAY
1. FEELING NERVOUS, ANXIOUS, OR ON EDGE: NEARLY EVERY DAY
6. BECOMING EASILY ANNOYED OR IRRITABLE: NEARLY EVERY DAY
8. IF YOU CHECKED OFF ANY PROBLEMS, HOW DIFFICULT HAVE THESE MADE IT FOR YOU TO DO YOUR WORK, TAKE CARE OF THINGS AT HOME, OR GET ALONG WITH OTHER PEOPLE?: VERY DIFFICULT
IF YOU CHECKED OFF ANY PROBLEMS ON THIS QUESTIONNAIRE, HOW DIFFICULT HAVE THESE PROBLEMS MADE IT FOR YOU TO DO YOUR WORK, TAKE CARE OF THINGS AT HOME, OR GET ALONG WITH OTHER PEOPLE: VERY DIFFICULT
GAD7 TOTAL SCORE: 21
GAD7 TOTAL SCORE: 21

## 2023-04-10 ASSESSMENT — PATIENT HEALTH QUESTIONNAIRE - PHQ9
SUM OF ALL RESPONSES TO PHQ QUESTIONS 1-9: 19
SUM OF ALL RESPONSES TO PHQ QUESTIONS 1-9: 19
10. IF YOU CHECKED OFF ANY PROBLEMS, HOW DIFFICULT HAVE THESE PROBLEMS MADE IT FOR YOU TO DO YOUR WORK, TAKE CARE OF THINGS AT HOME, OR GET ALONG WITH OTHER PEOPLE: SOMEWHAT DIFFICULT

## 2023-04-10 ASSESSMENT — PAIN SCALES - GENERAL: PAINLEVEL: WORST PAIN (10)

## 2023-04-10 NOTE — PATIENT INSTRUCTIONS
Nikolas Moore,    Here is the phone number to schedule individual therapy: [phone]    Thank you,  Dr. Castaneda    **For crisis resources, please see the information at the end of this document**   Patient Education    Thank you for coming to the [facility] [place] [facility].     Lab Testing:  If you had lab testing today and your results are reassuring or normal they will be mailed to you or sent through [employer] within 7 days. If the lab tests need quick action we will call you with the results. The phone number we will call with results is # [phone]. If this is not the best number please call our clinic and change the number.     Medication Refills:  If you need any refills please call your pharmacy and they will contact us. Our fax number for refills is [phone].   Three business days of notice are needed for general medication refill requests.   Five business days of notice are needed for controlled substance refill requests.   If you need to change to a different pharmacy, please contact the new pharmacy directly. The new pharmacy will help you get your medications transferred.     Contact Us:  Please call [phone] during business hours (8-5:00 M-F).   If you have medication related questions after clinic hours, or on the weekend, please call [phone].     Financial Assistance [phone]   Medical Records [phone]       MENTAL HEALTH CRISIS RESOURCES:  For a emergency help, please call 911 or go to the nearest Emergency Department.     Emergency Walk-In Options:   Em[facility] Unit @ [facility] ([place]): [phone] - Specialized mental health emergency area designed to be calming  [facility] ([place]): [phone]  [facility] ([place]): [phone]  [facility]): [phone]    [facility] Crisis Information:   [place]: [phone]  [place]: [phone]  [place] ([place]) - Adult:  [phone]     Child: [phone]  [place] - Adult: [phone]     Child: [phone]  [place]: [phone]  List of all [facility] resources:   https://[place].gov/dhs/people-we-serve/adults/health-care/mental-health/resources/crisis-contacts.jsp    National Crisis Information:   Crisis Text Line: Text  [place]  to 196476  Suicide & Crisis Lifeline: 988  National Suicide Prevention Lifeline: 8-929-718-TALK (1-452.958.2680)       For online chat options, visit https://suicidepreventionlifeline.org/chat/  Poison Control Center: 1-496.895.3765  Trans Lifeline: 1-161.870.1730 - Hotline for transgender people of all ages  The [employer]: 7-938-326-9657 - Hotline for LGBT youth     For Non-Emergency Support:   Fast Tracker: Mental Health & Substance Use Disorder Resources -   https://www.[facility].org/

## 2023-04-10 NOTE — ORAL ONC MGMT
Oral Chemotherapy Monitoring Program  Lab Follow Up    Patient is on Palbociclib (Ibrance)  Reviewed lab results from 4/10/23.    Assessment & Plan:  CBC and CMP reviewed. Results show no concerning abnormalities.  Plan to continue Ibrance as prescribed. Patient not contacted with lab results by OC team, as pt was seen in infusion suite today.    Follow-Up:  4/18/23: Alee Yang visit      Pearl Garcia, GabyD  Hematology/Oncology Clinical Pharmacist  Oral Chemotherapy Monitoring Program  [facility]  [phone]  April 10, 2023            8/24/2022    10:00 AM 8/31/2022     4:00 PM 9/8/2022     2:00 PM 11/21/2022    11:00 AM 12/20/2022    11:00 AM 2/13/2023     7:00 AM 4/10/2023     2:00 PM   ORAL CHEMOTHERAPY   Assessment Type Left Voicemail Left Voicemail Monthly Follow up Refill Monthly Follow up Refill Lab Monitoring   Diagnosis Code Breast Cancer Breast Cancer Breast Cancer Breast Cancer Breast Cancer Breast Cancer Breast Cancer   Providers Dr. Dimitrios Plunkett   [facility]   Drug Name Ibrance (palbociclib) Ibrance (palbociclib) Ibrance (palbociclib) Ibrance (palbociclib) Ibrance (palbociclib) Ibrance (palbociclib) Ibrance (palbociclib)   Dose   125 mg 125 mg 125 mg 125 mg 125 mg   Current Schedule   Daily Daily Daily Daily Daily   Cycle Details   3 weeks on, 1 week off 3 weeks on, 1 week off 3 weeks on, 1 week off 3 weeks on, 1 week off 3 weeks on, 1 week off   Start Date of Last Cycle     12/11/2022     Planned next cycle start date     1/8/2022     Doses missed in last 2 weeks   0  0     Adherence Assessment   Adherent  Adherent     Adverse Effects   Fatigue  Nausea  Anemia   Nausea     Grade 1     Pharmacist Intervention(nausea)     Yes     Intervention(s)     Rx medication recommendation     Anemia       Grade 1   Pharmacist Intervention(anemia)       No    Fatigue   Grade 1       Pharmacist Intervention(fatigue)   Yes       Intervention(s)   Patient education       Any new drug interactions?   No       Is the dose as ordered appropriate for the patient?       Yes   Since the last time we talked, have you been hospitalized or used the emergency room?       No       Labs:  _  Result Component Current Result Ref Range   Sodium 140 (4/10/2023) 136 - 145 mmol/L     _  Result Component Current Result Ref Range   Potassium 3.9 (4/10/2023) 3.4 - 5.3 mmol/L     _  Result Component Current Result Ref Range   Calcium 9.6 (4/10/2023) 8.6 - 10.0 mg/dL     No results found for Mag within last 30 days.     No results found for Phos within last 30 days.     _  Result Component Current Result Ref Range   Albumin 4.2 (4/10/2023) 3.5 - 5.2 g/dL     _  Result Component Current Result Ref Range   Urea Nitrogen 15.2 (4/10/2023) 6.0 - 20.0 mg/dL     _  Result Component Current Result Ref Range   Creatinine 1.19 (H) (4/10/2023) 0.51 - 0.95 mg/dL     _  Result Component Current Result Ref Range   AST 14 (4/10/2023) 10 - 35 U/L     _  Result Component Current Result Ref Range   ALT 13 (4/10/2023) 10 - 35 U/L     _  Result Component Current Result Ref Range   Bilirubin Total <0.2 (4/10/2023) <=1.2 mg/dL     _  Result Component Current Result Ref Range   WBC Count 4.4 (4/10/2023) 4.0 - 11.0 10e3/uL     _  Result Component Current Result Ref Range   Hemoglobin 10.7 (L) (4/10/2023) 11.7 - 15.7 g/dL     _  Result Component Current Result Ref Range   Platelet Count 259 (4/10/2023) 150 - 450 10e3/uL     _  Result Component Current Result Ref Range   Absolute Neutrophils 2.0 (4/10/2023) 1.6 - 8.3 10e3/uL

## 2023-04-10 NOTE — PROGRESS NOTES
Infusion Nursing Note:  Teresa Sanderson presents today for Zometa Infusion.        Note: Teresa comes to infusion today with no questions or concerns.  She has lower back pain that she rates at 10/10 today.  She said this pain has been ongoing and denies any need for intervention at this appointment.  She has been afebrile and denies signs and symptoms of infection including: cough, SOB, sore throat, diarrhea, vomiting, rash, or pain with urination.  She  wishes to proceed with today's planned treatment    No recent dental procedures or jaw pain today.  She tries to remember to take her Calcium and this RN encouraged her to continue to take this regularly.    She has noticed some dryness and peeling in the palms of her hands.  Using lotions and creams frequently.  This RN notes mild redness of hands.  Encouraged to talk to Alee about this at their visit on 4/18/23.  Inbasket was sent to Alee informing her.    Intravenous Access:  Peripheral IV placed in lab    Treatment Conditions:  Component      Latest Ref Rng 4/10/2023   Sodium      136 - 145 mmol/L 140    Potassium      3.4 - 5.3 mmol/L 3.9    Chloride      98 - 107 mmol/L 106    Carbon Dioxide (CO2)      22 - 29 mmol/L 25    Anion Gap      7 - 15 mmol/L 9    Urea Nitrogen      6.0 - 20.0 mg/dL 15.2    Creatinine      0.51 - 0.95 mg/dL 1.19 (H)    Calcium      8.6 - 10.0 mg/dL 9.6    Glucose      70 - 99 mg/dL 127 (H)    Alkaline Phosphatase      35 - 104 U/L 116 (H)    AST      10 - 35 U/L 14    ALT      10 - 35 U/L 13    Protein Total      6.4 - 8.3 g/dL 7.4    Albumin      3.5 - 5.2 g/dL 4.2    Bilirubin Total      <=1.2 mg/dL <0.2    GFR Estimate      >60 mL/min/1.73m2 56 (L)         Post Infusion Assessment:  Patient tolerated infusion without incident.     Discharge Plan:   Patient declined prescription refills.    AVS to patient via [employer].  Patient will return 4/18/23 julianne see Alee in clinic.   Patient discharged in stable condition  accompanied by: self.  Departure Mode: Ambulatory.      Ally Stevens RN

## 2023-04-10 NOTE — NURSING NOTE
Chief Complaint   Patient presents with     Blood Draw     Labs drawn with piv start by rn.  VS taken.     Labs drawn with PIV start by rn.  Pt tolerated well.  VS taken and pt checked in for next appt.    Sabine Cid RN

## 2023-04-10 NOTE — PATIENT INSTRUCTIONS
[facility] Triage and after hours / weekends / holidays:  [phone]    Please call the triage or after hours line if you experience a temperature greater than or equal to 100.4, shaking chills, have uncontrolled nausea, vomiting and/or diarrhea, dizziness, shortness of breath, chest pain, bleeding, unexplained bruising, or if you have any other new/concerning symptoms, questions or concerns.      If you are having any concerning symptoms or wish to speak to a provider before your next infusion visit, please call triage to notify them so we can adequately serve you.     If you need a refill on a narcotic prescription or other medication, please call before your infusion appointment.

## 2023-04-11 NOTE — PROGRESS NOTES
"  Disease State Management Encounter:                          Teresa Sanderson is a 47 year old female called for for an initial visit. She was referred to me from psychiatry.      Reason for visit: Requesting review of psychotropic medication history and if she has had adequate past medication trials.     Schizoaffective Disorder, bipolar type:   Current medications:   Sertraline 150mg daily  Seroquel 200mg nightly, will increase to 300mg nightly karel Moore is seen at [facility] by Yasmine Castaneda MD. She has been seen in clinic since Nov 2021. Most recent visit was 4/10/23 at which time Seroquel was restarted after patient had been out of medication for 4 days. Please see note by pt's provider for additional details regarding symptoms and history.     Today, patient reports her primary symptoms are anxiety and trouble sleeping. She does not believe she has been on higher doses of sertraline or Seroquel in the past. She finds them somewhat helpful and denies any current medication side effects. Teresa was born and raised in [place], but has lived in [place] \"on off, [6 times] in the last 20 years\". Much of her previous psychiatry care was in [place], but she has been living in [place] consistently since 2020/2021. She reports she has been on Seroquel since she was first diagnosed in 1998 and has been on sertraline for about the last 2 years.     Attempted to review past medication trials with patient, however she does not recall many details and I do not see any outside records. I am able to confirm that patient was prescribed Zyprexa up to 10mg  through our clinic from Nov 2021 - possibly Sept 2022, although appears medication compliance may have been an issue. Today, patient reports Zyprexa wasn't helpful. She doesn't remember if she has tried any other antipsychotic medications.  She recalls being prescribed lithium, depakote, fluoxetine, and either lexapro or celexa (isn't sure which one) but " feels none were effective. She recalls that Cymbalta was possibly somewhat helpful. She carries a previous diagnosis of bipolar disorder.     Assessment/Plan:    Please see paragraph above for past med trials which were discussed today; details are limited. Since patient has not been on higher doses of Seroquel, could possibly consider increasing dose to target sleep, anxiety, mood symptoms. Alternatively, if medication compliance is a concern, could possibly consider transitioning to oral risperidone or paliperidone with goal of transitioning to an HOWELL if tolerated. I was unable to confirm any past antipsychotic med trials other than Zyprexa and Seroquel.  Additionally, sertraline dose could be increased, although could carry risk of tom/hypomania or mood dysregulation.       Follow-up: Dr. Castaneda for ongoing med management, MTM as requested.     I spent 30 minutes with this patient today. A copy of the visit note was provided to the patient's provider(s).    A summary of these recommendations was sent via [employer].    Lianne Tuttle, PharmD, BCPP  Medication Therapy Management Pharmacist  [facility]       Medication Therapy Recommendations  No medication therapy recommendations to display

## 2023-04-12 ENCOUNTER — VIRTUAL VISIT (OUTPATIENT)
Dept: PHARMACY | Facility: CLINIC | Age: 48
End: 2023-04-12
Attending: STUDENT IN AN ORGANIZED HEALTH CARE EDUCATION/TRAINING PROGRAM
Payer: MEDICAID

## 2023-04-12 ENCOUNTER — PATIENT OUTREACH (OUTPATIENT)
Dept: ONCOLOGY | Facility: CLINIC | Age: 48
End: 2023-04-12
Payer: MEDICARE

## 2023-04-12 DIAGNOSIS — F25.0 SCHIZOAFFECTIVE DISORDER, BIPOLAR TYPE (H): Primary | ICD-10-CM

## 2023-04-12 DIAGNOSIS — C50.912 CARCINOMA OF LEFT BREAST METASTATIC TO BONE (H): Primary | ICD-10-CM

## 2023-04-12 DIAGNOSIS — C79.51 CARCINOMA OF LEFT BREAST METASTATIC TO BONE (H): Primary | ICD-10-CM

## 2023-04-12 PROCEDURE — 99207 PR NO CHARGE LOS: CPT | Mod: 93 | Performed by: PHARMACIST

## 2023-04-12 NOTE — Clinical Note
Hello!  Thank you for the referral. I tried to review past med trials with patient but she was a limited historian and I didn't see any outside records. Please see my note for details. I am happy to see her again if med changes are made and close follow-up would be helpful.   Thank you!  Lianne

## 2023-04-12 NOTE — PROGRESS NOTES
Writer received referral for Oncology Psychology. Reviewed referral for appropriate location and information, sent to New Patient Scheduling for completion.

## 2023-04-13 ENCOUNTER — MYC MEDICAL ADVICE (OUTPATIENT)
Dept: PSYCHIATRY | Facility: CLINIC | Age: 48
End: 2023-04-13
Payer: MEDICARE

## 2023-04-13 NOTE — PATIENT INSTRUCTIONS
"Recommendations from today's MTM visit:                                                      Follow-up: Dr. Castaneda for ongoing med management    It was great speaking with you today.  I value your experience and would be very thankful for your time in providing feedback in our clinic survey. In the next few days, you may receive an email or text message from [employer] with a link to a survey related to your  clinical pharmacist.\"     To schedule another MTM appointment, please call the clinic directly or you may call the MTM scheduling line at [phone] or toll-free at [phone].     My Clinical Pharmacist's contact information:                                                      Please feel free to contact me with any questions or concerns you have.      Lianne Tuttle, PharmD, BCPP  Medication Therapy Management Pharmacist  [facility]     "

## 2023-04-13 NOTE — CONFIDENTIAL NOTE
Writer MARVEL and sent patient a MyChart asking for an update on Seroquel - has she started taking?  When?  Does she notice any improvement?  How are her mood and sleep?

## 2023-04-15 ENCOUNTER — ANCILLARY PROCEDURE (OUTPATIENT)
Dept: PET IMAGING | Facility: CLINIC | Age: 48
End: 2023-04-15
Attending: INTERNAL MEDICINE
Payer: MEDICARE

## 2023-04-15 DIAGNOSIS — C50.912 CARCINOMA OF LEFT BREAST METASTATIC TO BONE (H): ICD-10-CM

## 2023-04-15 DIAGNOSIS — C79.51 CARCINOMA OF LEFT BREAST METASTATIC TO BONE (H): ICD-10-CM

## 2023-04-15 PROCEDURE — A9552 F18 FDG: HCPCS | Performed by: RADIOLOGY

## 2023-04-15 PROCEDURE — G1010 CDSM STANSON: HCPCS | Performed by: RADIOLOGY

## 2023-04-15 PROCEDURE — 78816 PET IMAGE W/CT FULL BODY: CPT | Mod: MG | Performed by: RADIOLOGY

## 2023-04-18 ENCOUNTER — VIRTUAL VISIT (OUTPATIENT)
Dept: ONCOLOGY | Facility: CLINIC | Age: 48
End: 2023-04-18
Attending: INTERNAL MEDICINE
Payer: MEDICARE

## 2023-04-18 DIAGNOSIS — Z91.199 NO-SHOW FOR APPOINTMENT: Primary | ICD-10-CM

## 2023-04-18 DIAGNOSIS — C79.51 CARCINOMA OF LEFT BREAST METASTATIC TO BONE (H): ICD-10-CM

## 2023-04-18 DIAGNOSIS — C50.912 CARCINOMA OF LEFT BREAST METASTATIC TO BONE (H): ICD-10-CM

## 2023-04-18 NOTE — LETTER
4/18/2023         RE: Teresa Sanderson  [address] [address]        Dear Colleague,    Thank you for referring your patient, Teresa Sanderson, to the [facility]. Please see a copy of my visit note below.      This patient was a no show for this scheduled appointment.      Again, thank you for allowing me to participate in the care of your patient.        Sincerely,        Maury Drake PsyD

## 2023-04-18 NOTE — PROGRESS NOTES
Oncology Visit:   Date on this visit: Apr 19, 2023    Diagnosis:  ER positive left breast cancer metastasized to bones.     Primary Physician: No Ref-Primary, Physician     History Of Present Illness:    Ms. Sanderson is a 47 year old female with a h/o tobacco abuse and DVTs with left breast cancer metastasized to bone. She presented to [place] ED with back pain on 12/5/2020. MRI of the L-spine showed an abnormal L4 lesion with associated right paraspinal mass, abnormalities in L5 and the left iliac bone were also seen. CT C/A/P showed a left breast mass, lytic lesions of T7, L4, and the pelvis, and a 3 cm lesion in the kidney (thought to be a cyst). Ultrasound of the bilateral lower extremities showed a non-occlusive thrombus in the left popliteal vein. Mammogram and ultrasound of the bilateral breasts on 12/17/2020 showed a spiculated mass measuring at least 7.8 cm at 12-1:00 left breast extending from the nipple to 9 cm from the nipple with associated nipple retraction. This mass was biopsied, and showed IDC with surrounding DCIS, grade 3, ER+ 90%, and IA+ 75%.  HER2 was equivocal in approximately 35% of tumor cells by FISH and was negative by IHC.    Metastatic Breast Cancer Treatment:  12/23/2021 - 1/7/2021  Radiation (3000 cGy) to the lumbar spine.  1/29/2021 - present  Ibrance, zoladex, and anastrozole.  5/17/2021 radiofrequency ablation, kyphoplasty to L4  8/20/2021  Bilateral salpingo-oophorectomies, Ibrance and anastrozole.  She was off anastrozole 12/2021 - 2/2022 and off Ibrance 01/2022 - 02/2022 due to stress and impending homelessness. Off both again 03/2022-04/2022 due to death of her son but restarted in 05/2022.    Interval History: Teresa presents in follow-up of metastatic breast cancer. She has been feeling okay besides continuing to have waxing and waning L groin pain. She will have this for a day or so and then it will remit for a few days then come back. She does not think activity  precipitates pain. She is using a walker for balance. She is spending a lot of time in bed. Has not been using gabapentin recently. She is using MS Contin intermittently. Robaxin is helpful. She is using tylenol several times per day as well. Back pain is stable.     No other new or different pain. Appetite has been fair. She forces herself to eat and weight is stable. No fevers/chills, cough, or SOB. No n/v, diarrhea, or constipation.     She has had trouble sleeping and her mood is still fairly uncontrolled. She continues to work closely with psychiatry.        Past Medical/Surgical History:   Past Medical History:   Diagnosis Date     Anxiety      Breast CA (H) 12/2020     Depression      DVT (deep venous thrombosis) (H) 2014     Left breast mass     x approximately 4-5 months     Pulmonary emboli (H)      Pyelonephritis      Schizoaffective disorder (H)      Tobacco use      Past Surgical History:   Procedure Laterality Date     COLONOSCOPY N/A 7/8/2022    Procedure: COLONOSCOPY, WITH POLYPECTOMY;  Surgeon: Ham Cano MD;  [place] [facility] [place]     IR LUMBAR KYPHOPLASTY VERTEBRAE  5/17/2021     LAPAROSCOPIC SALPINGO-OOPHORECTOMY Bilateral 8/20/2021    Procedure: BILATERAL SALPINGO-OOPHORECTOMY, LAPAROSCOPIC;  Surgeon: Rory Lopez MD;  Location: [facility] OR     TUBAL LIGATION  1998        Allergies   Allergen Reactions     Contrast Dye      Pt developed nausea after isovue 370 injection on 6/9/21        Current Outpatient Medications   Medication     exemestane (AROMASIN) 25 MG tablet     methocarbamol (ROBAXIN) 500 MG tablet     morphine (MS CONTIN) 15 MG CR tablet     gabapentin (NEURONTIN) 300 MG capsule     naloxone (NARCAN) 4 MG/0.1ML nasal spray     nicotine (COMMIT) 2 MG lozenge     nicotine (NICORETTE) 2 MG gum     ondansetron (ZOFRAN) 8 MG tablet     palbociclib (IBRANCE) 125 MG tablet     pantoprazole (PROTONIX) 40 MG EC tablet     polyethylene glycol (MIRALAX) 17 GM/Dose powder      prochlorperazine (COMPAZINE) 10 MG tablet     QUEtiapine (SEROQUEL) 100 MG tablet     rivaroxaban ANTICOAGULANT (XARELTO) 20 MG TABS tablet     SENNA-docusate sodium (SENNA S) 8.6-50 MG tablet     sertraline (ZOLOFT) 100 MG tablet     sertraline (ZOLOFT) 50 MG tablet     tolnaftate (TINACTIN) 1 % external cream     Vitamin D3 (CHOLECALCIFEROL) 25 mcg (1000 units) tablet     No current facility-administered medications for this visit.   '  Physical Exam:   BP (!) 133/92 (BP Location: Right arm, Patient Position: Sitting, Cuff Size: Adult Regular)   Pulse 79   Temp 98.1  F (36.7  C) (Oral)   Resp 16   Wt 110.7 kg (244 lb 1.6 oz)   SpO2 96%   BMI 33.11 kg/m     Wt Readings from Last 4 Encounters:   04/19/23 110.7 kg (244 lb 1.6 oz)   04/10/23 113.5 kg (250 lb 3.2 oz)   03/16/23 109.8 kg (242 lb 1.6 oz)   01/05/23 112.9 kg (249 lb)   General:  Very pleasant. Alert and oriented x 3.  HEENT:  Normocephalic.  Sclera anicteric. Enlarged thyroid on exam.  MMM.  Lymph:  No palpable cervical, supraclavicular, or axillary LAD. No inguinal LAD.   Chest:  CTA bilaterally.    CV:  RRR.   Abd:  Soft/NT/ND. + BS   Ext:  No edema of the bilateral lower extremities.    Musculo: No spinal tenderness. TTP L groin. No pain to lateral left hip. Strength and sensation intact.   Neuro:  Cranial nerves grossly intact.   Psych:  Mood and affect appear normal.    Laboratory/Imaging Studies:    04/10/23 11:50   Sodium 140   Potassium 3.9   Chloride 106   Carbon Dioxide (CO2) 25   Urea Nitrogen 15.2   Creatinine 1.19 (H)   GFR Estimate 56 (L)   Calcium 9.6   Anion Gap 9   Albumin 4.2   Protein Total 7.4   Alkaline Phosphatase 116 (H)   ALT 13   AST 14   Bilirubin Total <0.2   Cancer Antigen 15-3 44 (H)   CEA 2.9   Glucose 127 (H)   WBC 4.4   Hemoglobin 10.7 (L)   Hematocrit 31.9 (L)   Platelet Count 259   RBC Count 3.15 (L)    (H)   MCH 34.0 (H)   MCHC 33.5   RDW 15.0   % Neutrophils 46   % Lymphocytes 48   % Monocytes 2   %  Eosinophils 2   % Basophils 2   Absolute Basophils 0.1   NRBC/W 1 (H)   Absolute Neutrophil 2.0   Absolute Lymphocytes 2.1   Absolute Monocytes 0.1   Absolute Eosinophils 0.1   Absolute NRBCs 0.0   RBC Morphology Confirmed RBC Indices   Platelet Morphology Automated Count Confirmed. Platelet morphology is normal.   RBC Fragments Slight !   Teardrop Cells Slight !      10/17/22 16:10 11/14/22 09:47 01/05/23 13:01 03/16/23 16:50 04/10/23 11:50   Cancer Antigen 15-3 40 (H) 38 (H) 40 (H) 51 (H) 44 (H)   CA 27.29   45.6 (H)     CEA 3.8 3.0 2.9 2.9 2.9   (H): Data is abnormally high    PET/CT 4/15/23                                                                   IMPRESSION: In this patient with a history of left breast cancer,  status post palbociclib and anastrozole:  1. Evidence for disease progression compared to PET/CT from 8/12/2022.  There is increasing FDG uptake of osseous lesions in the left side of  the pelvis, concerning for progressive metastatic disease.  2. Stable hypermetabolic 1.2 cm soft tissue nodule in the left breast  adjacent to the biopsy clip.  3. Increased linear hyperdensity in the IVC adjacent to the L4  kyphoplasty, consistent with IVC PMMA cement intravasation.     I have personally reviewed the examination and initial interpretation  and I agree with the findings.     MALENA FARAH MD      04/19/23 11:48   Sodium 141   Potassium 4.0   Chloride 108 (H)   Carbon Dioxide (CO2) 24   Urea Nitrogen 14.1   Creatinine 0.86   GFR Estimate 83   Calcium 9.1   Anion Gap 9   Glucose 153 (H)     ASSESSMENT/PLAN:   47 year old female with history of DVT and ER positive left breast cancer metastasized to bones.    1.  Metastatic breast cancer: She has been on treatment with palbociclib and anastrozole. PET/CT on 8/12/2022 with stable disease.  Of note, she was scheduled for repeat PET/CT on 2/10, however, did not show for the appointment. She had PET rescheduled last week showing two small bone  metastasis in L pelvis. Discussed with Dr. Plunkett and will continue palbociclib and change anastrozole to exemestane.   - Will refer to radiation for palliative XRT to L pelvis.   - Continue to monitor monthly tumor markers. Have been overall stable.   - Monthly follow-up and PET in about 3 months.   - At time of progression would consider next line treatment with abemaciclib and fulvestrant.    2. Left hip pain: New bone mets as above. Refilled MS Contin daily--advised her to take this every day. Refilled robaxin. Resume gabapentin. Tylenol PRN okay.     3.  Schizoaffective disorder, bipolar type: She is on treatment with Zoloft and Seroquel.  Ongoing follow up with psychiatry.     4.  Bone metastases/back pain:  She is s/p XRT to the lumbar spine and kyphoplasty of L4--there was some extravasation of cement which procedularist is aware of and recommended continued AC indefinitely.   - Receiving Zometa once every 3 months. Next due mid July.     5.  DVT:  She continues on Xarelto.    6.  Enlarged thyroid: Biopsy on 1/23 c/w thyroiditis.    Greater than 40 minutes was spent with this patient with greater than 20 minutes spent in counseling and coordination of care.    Alee Yang PA-C

## 2023-04-19 ENCOUNTER — PATIENT OUTREACH (OUTPATIENT)
Dept: CARE COORDINATION | Facility: CLINIC | Age: 48
End: 2023-04-19

## 2023-04-19 ENCOUNTER — ONCOLOGY VISIT (OUTPATIENT)
Dept: ONCOLOGY | Facility: CLINIC | Age: 48
End: 2023-04-19
Attending: PHYSICIAN ASSISTANT
Payer: MEDICARE

## 2023-04-19 VITALS
RESPIRATION RATE: 16 BRPM | WEIGHT: 244.1 LBS | BODY MASS INDEX: 33.11 KG/M2 | SYSTOLIC BLOOD PRESSURE: 133 MMHG | TEMPERATURE: 98.1 F | DIASTOLIC BLOOD PRESSURE: 92 MMHG | HEART RATE: 79 BPM | OXYGEN SATURATION: 96 %

## 2023-04-19 DIAGNOSIS — G89.3 CANCER ASSOCIATED PAIN: ICD-10-CM

## 2023-04-19 DIAGNOSIS — C79.51 CARCINOMA OF LEFT BREAST METASTATIC TO BONE (H): Primary | ICD-10-CM

## 2023-04-19 DIAGNOSIS — M25.552 HIP PAIN, LEFT: ICD-10-CM

## 2023-04-19 DIAGNOSIS — C50.912 CARCINOMA OF LEFT BREAST METASTATIC TO BONE (H): Primary | ICD-10-CM

## 2023-04-19 LAB
ANION GAP SERPL CALCULATED.3IONS-SCNC: 9 MMOL/L (ref 7–15)
BUN SERPL-MCNC: 14.1 MG/DL (ref 6–20)
CALCIUM SERPL-MCNC: 9.1 MG/DL (ref 8.6–10)
CHLORIDE SERPL-SCNC: 108 MMOL/L (ref 98–107)
CREAT SERPL-MCNC: 0.86 MG/DL (ref 0.51–0.95)
DEPRECATED HCO3 PLAS-SCNC: 24 MMOL/L (ref 22–29)
GFR SERPL CREATININE-BSD FRML MDRD: 83 ML/MIN/1.73M2
GLUCOSE SERPL-MCNC: 153 MG/DL (ref 70–99)
POTASSIUM SERPL-SCNC: 4 MMOL/L (ref 3.4–5.3)
SODIUM SERPL-SCNC: 141 MMOL/L (ref 136–145)

## 2023-04-19 PROCEDURE — 36415 COLL VENOUS BLD VENIPUNCTURE: CPT | Performed by: PHYSICIAN ASSISTANT

## 2023-04-19 PROCEDURE — 80048 BASIC METABOLIC PNL TOTAL CA: CPT | Performed by: PHYSICIAN ASSISTANT

## 2023-04-19 PROCEDURE — 99215 OFFICE O/P EST HI 40 MIN: CPT | Performed by: PHYSICIAN ASSISTANT

## 2023-04-19 PROCEDURE — G0463 HOSPITAL OUTPT CLINIC VISIT: HCPCS | Performed by: PHYSICIAN ASSISTANT

## 2023-04-19 RX ORDER — MORPHINE SULFATE 15 MG/1
15 TABLET, FILM COATED, EXTENDED RELEASE ORAL DAILY
Qty: 30 TABLET | Refills: 0 | Status: SHIPPED | OUTPATIENT
Start: 2023-04-19 | End: 2023-10-21

## 2023-04-19 RX ORDER — METHOCARBAMOL 500 MG/1
500 TABLET, FILM COATED ORAL 3 TIMES DAILY PRN
Qty: 30 TABLET | Refills: 0 | Status: SHIPPED | OUTPATIENT
Start: 2023-04-19 | End: 2023-12-12

## 2023-04-19 RX ORDER — EXEMESTANE 25 MG/1
25 TABLET ORAL DAILY
Qty: 90 TABLET | Refills: 3 | Status: SHIPPED | OUTPATIENT
Start: 2023-04-19 | End: 2023-10-05

## 2023-04-19 ASSESSMENT — PAIN SCALES - GENERAL: PAINLEVEL: SEVERE PAIN (7)

## 2023-04-19 NOTE — NURSING NOTE
Oncology Rooming Note    April 19, 2023 11:18 AM   Teresa Sanderson is a 47 year old female who presents for:    Chief Complaint   Patient presents with     Oncology Clinic Visit     Carcinoma of left breast metastatic to bone      Initial Vitals: BP (!) 133/92 (BP Location: Right arm, Patient Position: Sitting, Cuff Size: Adult Regular)   Pulse 79   Temp 98.1  F (36.7  C) (Oral)   Resp 16   Wt 110.7 kg (244 lb 1.6 oz)   SpO2 96%   BMI 33.11 kg/m   Estimated body mass index is 33.11 kg/m  as calculated from the following:    Height as of 12/16/22: 1.829 m (6').    Weight as of this encounter: 110.7 kg (244 lb 1.6 oz). Body surface area is 2.37 meters squared.  Severe Pain (7) Comment: Data Unavailable   No LMP recorded. (Menstrual status: Tubal ).  Allergies reviewed: Yes  Medications reviewed: Yes    Medications: Medication refills not needed today.  Pharmacy name entered into [facility]:    [id number] - [place], [place] - [address] AT [facility] [address]  [facility] [place] - [address]  [id number] - [place] - [address] [id number] - [place], [place] - [address] AT [address] [place] [address] & [facility] - [place], [place] - [address]    Clinical concerns: No additional clinical concerns.        Mami Grimes), LPN April 19, 2023 11:19 AM

## 2023-04-19 NOTE — LETTER
4/19/2023         RE: Teresa Sanderson  [address] [facility], [place], operated by [facility] [id number]        Dear Colleague,    Thank you for referring your patient, Teresa Sanderson, to the [facility]. Please see a copy of my visit note below.    Oncology Visit:   Date on this visit: Apr 19, 2023    Diagnosis:  ER positive left breast cancer metastasized to bones.     Primary Physician: No Ref-Primary, Physician     History Of Present Illness:    Ms. Sanderson is a 47 year old female with a h/o tobacco abuse and DVTs with left breast cancer metastasized to bone. She presented to [place] ED with back pain on 12/5/2020. MRI of the L-spine showed an abnormal L4 lesion with associated right paraspinal mass, abnormalities in L5 and the left iliac bone were also seen. CT C/A/P showed a left breast mass, lytic lesions of T7, L4, and the pelvis, and a 3 cm lesion in the kidney (thought to be a cyst). Ultrasound of the bilateral lower extremities showed a non-occlusive thrombus in the left popliteal vein. Mammogram and ultrasound of the bilateral breasts on 12/17/2020 showed a spiculated mass measuring at least 7.8 cm at 12-1:00 left breast extending from the nipple to 9 cm from the nipple with associated nipple retraction. This mass was biopsied, and showed IDC with surrounding DCIS, grade 3, ER+ 90%, and DC+ 75%.  HER2 was equivocal in approximately 35% of tumor cells by FISH and was negative by IHC.    Metastatic Breast Cancer Treatment:  12/23/2021 - 1/7/2021  Radiation (3000 cGy) to the lumbar spine.  1/29/2021 - present  Ibrance, zoladex, and anastrozole.  5/17/2021 radiofrequency ablation, kyphoplasty to L4  8/20/2021  Bilateral salpingo-oophorectomies, Ibrance and anastrozole.  She was off anastrozole 12/2021 - 2/2022 and off Ibrance 01/2022 - 02/2022 due to stress and impending homelessness. Off both again 03/2022-04/2022 due to death of her son but restarted in 05/2022.    Interval History: Teresa  presents in follow-up of metastatic breast cancer. She has been feeling okay besides continuing to have waxing and waning L groin pain. She will have this for a day or so and then it will remit for a few days then come back. She does not think activity precipitates pain. She is using a walker for balance. She is spending a lot of time in bed. Has not been using gabapentin recently. She is using MS Contin intermittently. Robaxin is helpful. She is using tylenol several times per day as well. Back pain is stable.     No other new or different pain. Appetite has been fair. She forces herself to eat and weight is stable. No fevers/chills, cough, or SOB. No n/v, diarrhea, or constipation.     She has had trouble sleeping and her mood is still fairly uncontrolled. She continues to work closely with psychiatry.        Past Medical/Surgical History:   Past Medical History:   Diagnosis Date    Anxiety     Breast CA (H) 12/2020    Depression     DVT (deep venous thrombosis) (H) 2014    Left breast mass     x approximately 4-5 months    Pulmonary emboli (H)     Pyelonephritis     Schizoaffective disorder (H)     Tobacco use      Past Surgical History:   Procedure Laterality Date    COLONOSCOPY N/A 7/8/2022    Procedure: COLONOSCOPY, WITH POLYPECTOMY;  Surgeon: Ham Cano MD;  Location: [facility] OR    IR LUMBAR KYPHOPLASTY VERTEBRAE  5/17/2021    LAPAROSCOPIC SALPINGO-OOPHORECTOMY Bilateral 8/20/2021    Procedure: BILATERAL SALPINGO-OOPHORECTOMY, LAPAROSCOPIC;  Surgeon: Rory Lopez MD;  Location: [facility] OR    TUBAL LIGATION  1998        Allergies   Allergen Reactions    Contrast Dye      Pt developed nausea after isovue 370 injection on 6/9/21        Current Outpatient Medications   Medication    exemestane (AROMASIN) 25 MG tablet    methocarbamol (ROBAXIN) 500 MG tablet    morphine (MS CONTIN) 15 MG CR tablet    gabapentin (NEURONTIN) 300 MG capsule    naloxone (NARCAN) 4 MG/0.1ML nasal spray    nicotine (COMMIT) 2  MG lozenge    nicotine (NICORETTE) 2 MG gum    ondansetron (ZOFRAN) 8 MG tablet    palbociclib (IBRANCE) 125 MG tablet    pantoprazole (PROTONIX) 40 MG EC tablet    polyethylene glycol (MIRALAX) 17 GM/Dose powder    prochlorperazine (COMPAZINE) 10 MG tablet    QUEtiapine (SEROQUEL) 100 MG tablet    rivaroxaban ANTICOAGULANT (XARELTO) 20 MG TABS tablet    SENNA-docusate sodium (SENNA S) 8.6-50 MG tablet    sertraline (ZOLOFT) 100 MG tablet    sertraline (ZOLOFT) 50 MG tablet    tolnaftate (TINACTIN) 1 % external cream    Vitamin D3 (CHOLECALCIFEROL) 25 mcg (1000 units) tablet     No current facility-administered medications for this visit.   '  Physical Exam:   BP (!) 133/92 (BP Location: Right arm, Patient Position: Sitting, Cuff Size: Adult Regular)   Pulse 79   Temp 98.1  F (36.7  C) (Oral)   Resp 16   Wt 110.7 kg (244 lb 1.6 oz)   SpO2 96%   BMI 33.11 kg/m     Wt Readings from Last 4 Encounters:   04/19/23 110.7 kg (244 lb 1.6 oz)   04/10/23 113.5 kg (250 lb 3.2 oz)   03/16/23 109.8 kg (242 lb 1.6 oz)   01/05/23 112.9 kg (249 lb)   General:  Very pleasant. Alert and oriented x 3.  HEENT:  Normocephalic.  Sclera anicteric. Enlarged thyroid on exam.  MMM.  Lymph:  No palpable cervical, supraclavicular, or axillary LAD. No inguinal LAD.   Chest:  CTA bilaterally.    CV:  RRR.   Abd:  Soft/NT/ND. + BS   Ext:  No edema of the bilateral lower extremities.    Musculo: No spinal tenderness. TTP L groin. No pain to lateral left hip. Strength and sensation intact.   Neuro:  Cranial nerves grossly intact.   Psych:  Mood and affect appear normal.    Laboratory/Imaging Studies:    04/10/23 11:50   Sodium 140   Potassium 3.9   Chloride 106   Carbon Dioxide (CO2) 25   Urea Nitrogen 15.2   Creatinine 1.19 (H)   GFR Estimate 56 (L)   Calcium 9.6   Anion Gap 9   Albumin 4.2   Protein Total 7.4   Alkaline Phosphatase 116 (H)   ALT 13   AST 14   Bilirubin Total <0.2   Cancer Antigen 15-3 44 (H)   CEA 2.9   Glucose 127 (H)    WBC 4.4   Hemoglobin 10.7 (L)   Hematocrit 31.9 (L)   Platelet Count 259   RBC Count 3.15 (L)    (H)   MCH 34.0 (H)   MCHC 33.5   RDW 15.0   % Neutrophils 46   % Lymphocytes 48   % Monocytes 2   % Eosinophils 2   % Basophils 2   Absolute Basophils 0.1   NRBC/W 1 (H)   Absolute Neutrophil 2.0   Absolute Lymphocytes 2.1   Absolute Monocytes 0.1   Absolute Eosinophils 0.1   Absolute NRBCs 0.0   RBC Morphology Confirmed RBC Indices   Platelet Morphology Automated Count Confirmed. Platelet morphology is normal.   RBC Fragments Slight !   Teardrop Cells Slight !      10/17/22 16:10 11/14/22 09:47 01/05/23 13:01 03/16/23 16:50 04/10/23 11:50   Cancer Antigen 15-3 40 (H) 38 (H) 40 (H) 51 (H) 44 (H)   CA 27.29   45.6 (H)     CEA 3.8 3.0 2.9 2.9 2.9   (H): Data is abnormally high    PET/CT 4/15/23                                                                   IMPRESSION: In this patient with a history of left breast cancer,  status post palbociclib and anastrozole:  1. Evidence for disease progression compared to PET/CT from 8/12/2022.  There is increasing FDG uptake of osseous lesions in the left side of  the pelvis, concerning for progressive metastatic disease.  2. Stable hypermetabolic 1.2 cm soft tissue nodule in the left breast  adjacent to the biopsy clip.  3. Increased linear hyperdensity in the IVC adjacent to the L4  kyphoplasty, consistent with IVC PMMA cement intravasation.     I have personally reviewed the examination and initial interpretation  and I agree with the findings.     MALENA FARAH MD      04/19/23 11:48   Sodium 141   Potassium 4.0   Chloride 108 (H)   Carbon Dioxide (CO2) 24   Urea Nitrogen 14.1   Creatinine 0.86   GFR Estimate 83   Calcium 9.1   Anion Gap 9   Glucose 153 (H)     ASSESSMENT/PLAN:   47 year old female with history of DVT and ER positive left breast cancer metastasized to bones.    1.  Metastatic breast cancer: She has been on treatment with palbociclib and  anastrozole. PET/CT on 8/12/2022 with stable disease.  Of note, she was scheduled for repeat PET/CT on 2/10, however, did not show for the appointment. She had PET rescheduled last week showing two small bone metastasis in L pelvis. Discussed with Dr. Plunkett and will continue palbociclib and change anastrozole to exemestane.   - Will refer to radiation for palliative XRT to L pelvis.   - Continue to monitor monthly tumor markers. Have been overall stable.   - Monthly follow-up and PET in about 3 months.   - At time of progression would consider next line treatment with abemaciclib and fulvestrant.    2. Left hip pain: New bone mets as above. Refilled MS Contin daily--advised her to take this every day. Refilled robaxin. Resume gabapentin. Tylenol PRN okay.     3.  Schizoaffective disorder, bipolar type: She is on treatment with Zoloft and Seroquel.  Ongoing follow up with psychiatry.     4.  Bone metastases/back pain:  She is s/p XRT to the lumbar spine and kyphoplasty of L4--there was some extravasation of cement which procedularist is aware of and recommended continued AC indefinitely.   - Receiving Zometa once every 3 months. Next due mid July.     5.  DVT:  She continues on Xarelto.    6.  Enlarged thyroid: Biopsy on 1/23 c/w thyroiditis.    Greater than 40 minutes was spent with this patient with greater than 20 minutes spent in counseling and coordination of care.    Alee Yang PA-C

## 2023-04-19 NOTE — PROGRESS NOTES
Social Work Follow-Up   Oncology Clinic    Data/Intervention:  Patient Name:  Teresa Sanderson  /Age:  1975 (47 year old)    Reason for Follow-Up:  Social supports  Collaborated With:    -patient  -    Assessment:  SW following up on referral regarding connecting patient with supportive resources( bridging, ARMS worker, PCA, cancer support groups, loss of child support groups). There was no answer. SW left a message with reason for call. SW provided patient with contact information for them to reach SW.    Plan:  Previously provided patient/family with writer's contact information and availability.   SW will await patient's return call and remain available as needed.     Jo CORTEZ, [facility]  - Oncology  Phone : [phone]  Pager: [phone]

## 2023-04-19 NOTE — NURSING NOTE
Venipuncture blood draw done on patients Right ac. Patient tolerated well without any complications. 21G needle used. Pt last name and  verified on specimen label and chart. Specimen sent to first floor lab. See flowsheets      Nesha De Leon CMA on 2023 at 11:47 AM

## 2023-04-20 NOTE — CONFIDENTIAL NOTE
"Writer spoke with patient.  She reports taking Seroquel for approximately one week.  She still falls asleep around 2 am, but feels improvement in the quality of sleep.  She states that her mood is \"OK,\" however, she is concerned about news received at her oncology appointment this week.  She wants Dr. Castaneda to know the Seroquel \"is helping\" and she is grateful for her assistance.          "

## 2023-04-21 ENCOUNTER — TELEPHONE (OUTPATIENT)
Dept: PSYCHIATRY | Facility: CLINIC | Age: 48
End: 2023-04-21
Payer: MEDICARE

## 2023-04-21 NOTE — TELEPHONE ENCOUNTER
"Called Teresa to check-in and discuss further medication options. Regan RAMOS spoke with Teresa yesterday:     \"Writer spoke with patient.  She reports taking Seroquel for approximately one week.  She still falls asleep around 2 am, but feels improvement in the quality of sleep.  She states that her mood is \"OK,\" however, she is concerned about news received at her oncology appointment this week.  She wants Dr. Castaneda to know the Seroquel \"is helping\" and she is grateful for her assistance.\"    - Left a voicemail asking Teresa to either call the clinic back or respond with a [employer] message if she would like to discuss any further medication adjustments before our next appointment.      Yasmine Castaneda MD  Psychiatry PGY-3  "

## 2023-04-25 ENCOUNTER — OFFICE VISIT (OUTPATIENT)
Dept: RADIATION ONCOLOGY | Facility: CLINIC | Age: 48
End: 2023-04-25
Attending: RADIOLOGY
Payer: MEDICARE

## 2023-04-25 VITALS
HEART RATE: 61 BPM | BODY MASS INDEX: 34.18 KG/M2 | WEIGHT: 252 LBS | RESPIRATION RATE: 18 BRPM | SYSTOLIC BLOOD PRESSURE: 138 MMHG | OXYGEN SATURATION: 99 % | DIASTOLIC BLOOD PRESSURE: 66 MMHG

## 2023-04-25 DIAGNOSIS — C50.919 CARCINOMA OF BREAST METASTATIC TO BONE, UNSPECIFIED LATERALITY (H): Primary | ICD-10-CM

## 2023-04-25 DIAGNOSIS — C79.51 CARCINOMA OF BREAST METASTATIC TO BONE, UNSPECIFIED LATERALITY (H): Primary | ICD-10-CM

## 2023-04-25 PROCEDURE — 77290 THER RAD SIMULAJ FIELD CPLX: CPT | Mod: 26 | Performed by: RADIOLOGY

## 2023-04-25 PROCEDURE — 77334 RADIATION TREATMENT AID(S): CPT | Mod: 26 | Performed by: RADIOLOGY

## 2023-04-25 PROCEDURE — 77290 THER RAD SIMULAJ FIELD CPLX: CPT | Performed by: RADIOLOGY

## 2023-04-25 PROCEDURE — 99215 OFFICE O/P EST HI 40 MIN: CPT | Mod: 25 | Performed by: RADIOLOGY

## 2023-04-25 PROCEDURE — 77334 RADIATION TREATMENT AID(S): CPT | Performed by: RADIOLOGY

## 2023-04-25 PROCEDURE — G0463 HOSPITAL OUTPT CLINIC VISIT: HCPCS | Mod: 25 | Performed by: RADIOLOGY

## 2023-04-25 ASSESSMENT — PAIN SCALES - GENERAL: PAINLEVEL: SEVERE PAIN (7)

## 2023-04-25 NOTE — PROGRESS NOTES
FOLLOW-UP VISIT    Patient Name: Teresa Sanderson      : 1975     Age: 47 year old        ______________________________________________________________________________     Chief Complaint   Patient presents with     Radiation Therapy     Follow up and sim     /66   Pulse 61   Resp 18   Wt 114.3 kg (252 lb)   SpO2 99%   BMI 34.18 kg/m       Date Radiation Completed: Metastatic Breast Cancer: Lumbar spine 3000 cGy completed 21    Pain  Current history of pain associated with this visit:   Intensity: 7/10  Current: aching  Location: arms and glutes   Treatment: tylenol and ibuprofen     Labs  Other Labs: Yes: 2023    Imaging  PET 4/15/2023    Residual Radiation side effect: denies  Pregnant: No  Cardiac Devices: None      Additional Instructions: Proceed with simulation

## 2023-04-25 NOTE — PROGRESS NOTES
2023    Teresa Sanderson  [phone] (home)   : 1975  MRN: [id number]       RADIATION ONCOLOGY CONSULT/RE-EVALUATION     CC: Arm and buttock area pain    Identification and Purpose of Visit:  Teresa Sanderson is a 47 year old with a history of de víctor stage IV breast cancer metastatic to bone, ER/NJ positive, HER2 equivocal and approximately 35% of cells by FISH negative by IHC, most recently on palbociclib and anastrozole.  She is referred by Dr. Plunkett for consultation to discuss the potential role for radiotherapy for symptomatic left iliac bone metastasis.    Past radiation oncologist: Dr. Kramer  Medical oncologist: Dr. Plunkett      History of Present Illness:  Ms. Sanderson' relevant oncologic history dates back to late  when presented to [facility] ED with back pain on 2020. MRI of the L-spine showed an abnormal L4 lesion with associated right paraspinal mass, abnormalities in L5 and the left iliac bone were also seen. CT C/A/P showed a left breast mass, lytic lesions of T7, L4, and the pelvis. Mammogram and ultrasound of the bilateral breasts on 2020 showed a spiculated mass measuring at least 7.8 cm at 12-1:00 left breast extending from the nipple to 9 cm from the nipple with associated nipple retraction. This mass was biopsied, and showed IDC with surrounding DCIS, grade 3, ER+ 90%, and NJ+ 75%.  HER2 was equivocal in approximately 35% of tumor cells by FISH and was negative by IHC.    She underwent radiotherapy to 3000 cGy in 10 fractions to the lumbar spine with Dr. Kramer from 2020 to 2021.  She then began Ibrance, Zoladex, and anastrozole on 2021.  She underwent radiofrequency ablation and kyphoplasty to L4 on 2021.  She underwent bilateral salpingo-oophorectomies on 2021. She was off anastrozole 2021 - 2022 and off Ibrance 2022 - 2022 due to stress and impending homelessness. Off both again 2022-2022 due to death of her  son but restarted in 05/2022.    PET/CT on 4/15/2023 showed increasing FDG uptake of osseous lesions in the left side of the pelvis, concerning for progressive metastatic disease.  There was a stable hypermetabolic 1.2 cm soft tissue nodule in the left breast adjacent to the biopsy clip as well as  increased linear hyperdensity in the IVC adjacent to the L4 kyphoplasty, consistent with IVC PMMA cement intravasation.    She last saw her medical oncology team on 4/19/2023, with plans to change anastrozole to exemestane based on PET/CT.     Ms. Sanderson reports several weeks or more of left groin area pain.  This is not significantly worse with activity.  There is no associated radiation of pain or weakness.  Today she reports a feeling of bilateral buttock area tenderness as well. She is using a walker for balance. She is spending a lot of time in bed.  She reported her medical oncology team that she has not been using gabapentin recently. She is using MS Contin intermittently. Robaxin is helpful.  However, she reports she is primarily using ibuprofen and Tylenol for pain.  Back pain is stable.      She also reports 1 day of significant tightness in the bilateral shoulders when she raises her arms.  There is no pain at rest.  She denies significant associated cervical neck pain.  She denies associated upper extremities weakness.  She does not recall doing any strenuous activities or other unusual physical activities in the past day to explain these symptoms.    Between both sides, pain is presently 7 out of 10 in severity.  She is declining use of stronger pain medications at this point.    She also reports significant troubles with neuropathy in the upper extremities.    She continues to work closely with psychiatry for her mood.        Review of Systems: As per HPI; otherwise, a 10 system review is unremarkable    Past Medical History:   Diagnosis Date     Anxiety      Breast CA (H) 12/2020     Depression      DVT  (deep venous thrombosis) (H) 2014     Left breast mass     x approximately 4-5 months     Pulmonary emboli (H)      Pyelonephritis      Schizoaffective disorder (H)      Tobacco use         Past Surgical History:   Procedure Laterality Date     COLONOSCOPY N/A 7/8/2022    Procedure: COLONOSCOPY, WITH POLYPECTOMY;  Surgeon: Ham Cano MD;  Location: [facility] OR     IR LUMBAR KYPHOPLASTY VERTEBRAE  5/17/2021     LAPAROSCOPIC SALPINGO-OOPHORECTOMY Bilateral 8/20/2021    Procedure: BILATERAL SALPINGO-OOPHORECTOMY, LAPAROSCOPIC;  Surgeon: Rory Lopez MD;  Location: [facility] OR     TUBAL LIGATION  1998        Current Medications:    Current Outpatient Medications:      exemestane (AROMASIN) 25 MG tablet, Take 1 tablet (25 mg) by mouth daily, Disp: 90 tablet, Rfl: 3     methocarbamol (ROBAXIN) 500 MG tablet, Take 1 tablet (500 mg) by mouth 3 times daily as needed for muscle spasms, Disp: 30 tablet, Rfl: 0     morphine (MS CONTIN) 15 MG CR tablet, Take 1 tablet (15 mg) by mouth daily, Disp: 30 tablet, Rfl: 0     naloxone (NARCAN) 4 MG/0.1ML nasal spray, Spray 1 spray (4 mg) into one nostril alternating nostrils once as needed for opioid reversal every 2-3 minutes until assistance arrives, Disp: 0.2 mL, Rfl: 0     nicotine (COMMIT) 2 MG lozenge, Place 1 lozenge (2 mg) inside cheek every hour as needed for smoking cessation, Disp: 27 lozenge, Rfl: 3     nicotine (NICORETTE) 2 MG gum, Place 1 each (2 mg) inside cheek every hour as needed for smoking cessation, Disp: 90 each, Rfl: 1     ondansetron (ZOFRAN) 8 MG tablet, Take 1 tablet (8 mg) by mouth every 8 hours as needed for nausea, Disp: 30 tablet, Rfl: 3     palbociclib (IBRANCE) 125 MG tablet, Take 1 tablet (125 mg) by mouth daily Take for 21 days, then 7 days off., Disp: 21 tablet, Rfl: 2     pantoprazole (PROTONIX) 40 MG EC tablet, Take 1 tablet (40 mg) by mouth every morning (before breakfast), Disp: 30 tablet, Rfl: 1     polyethylene glycol (MIRALAX) 17  GM/Dose powder, Take 17 g by mouth daily as needed for constipation, Disp: 578 g, Rfl: 3     prochlorperazine (COMPAZINE) 10 MG tablet, Take 1 tablet (10 mg) by mouth every 6 hours as needed for nausea or vomiting, Disp: 30 tablet, Rfl: 3     QUEtiapine (SEROQUEL) 100 MG tablet, Take 3 tablets (300 mg) by mouth At Bedtime, Disp: 90 tablet, Rfl: 1     rivaroxaban ANTICOAGULANT (XARELTO) 20 MG TABS tablet, Take 1 tablet (20 mg) by mouth daily (with dinner), Disp: 90 tablet, Rfl: 3     SENNA-docusate sodium (SENNA S) 8.6-50 MG tablet, Take 2 tablets by mouth 2 times daily as needed (Constipation), Disp: 100 tablet, Rfl: 3     sertraline (ZOLOFT) 100 MG tablet, Take one tab (100mg) by mouth every morning. Take in addition to 50 mg tablet for total dose of 150mg daily, Disp: 30 tablet, Rfl: 1     sertraline (ZOLOFT) 50 MG tablet, Take 1 tablet (50 mg) by mouth daily Take in addition to 100mg tablet for total dose of 150mg daily, Disp: 30 tablet, Rfl: 1     tolnaftate (TINACTIN) 1 % external cream, Apply topically 2 times daily, Disp: 30 g, Rfl: 1     Vitamin D3 (CHOLECALCIFEROL) 25 mcg (1000 units) tablet, Take 1 tablet (25 mcg) by mouth daily, Disp: 90 tablet, Rfl: 3     gabapentin (NEURONTIN) 300 MG capsule, Take 2 capsules (600 mg) by mouth every morning AND 2 capsules (600 mg) daily at 2 pm AND 2 capsules (600 mg) At Bedtime. Do all this for 30 days., Disp: 180 capsule, Rfl: 2    Allergies:  Allergies   Allergen Reactions     Contrast Dye      Pt developed nausea after isovue 370 injection on 6/9/21         Social History:  Social History     Socioeconomic History     Marital status: Single     Spouse name: None     Number of children: None     Years of education: None     Highest education level: None   Tobacco Use     Smoking status: Every Day     Packs/day: 0.25     Types: Cigarettes     Start date: 5/13/2011     Smokeless tobacco: Never   Substance and Sexual Activity     Alcohol use: Not Currently     Comment:  occ     Drug use: Yes     Types: Marijuana     Comment: occ     Sexual activity: Not Currently     Partners: Male     Social Determinants of Health     Intimate Partner Violence: Not At Risk (7/14/2022)    Humiliation, Afraid, Rape, and Kick questionnaire      Fear of Current or Ex-Partner: No      Emotionally Abused: No      Physically Abused: No      Sexually Abused: No        Family History   Problem Relation Age of Onset     Lung Cancer Mother      Lung Cancer Father      Lupus Brother      Kidney Disease Brother      Diabetes Daughter      Asthma Son      Cardiovascular Maternal Aunt      Hypertension Other      Diabetes Other      Deep Vein Thrombosis (DVT) No family hx of          Physical Exam:  KPS: 70-80  /66   Pulse 61   Resp 18   Wt 114.3 kg (252 lb)   SpO2 99%   BMI 34.18 kg/m  , Pain Score Severe Pain (7)/10  General: Alert and in no acute distress.  HEENT: Normocephalic, atraumatic. No visible scleral icterus.  Neck: Apparent full range of motion.  CV: Appears well-perfused, with no visible cyanosis.  Lungs: Breathing easily on room air, with no difficulty completing full sentences  Extremities:  No visible edema of the upper extremities. Full range of motion at the shoulders and elbows.  There is no significant pain to palpation at the bilateral shoulders and upper arms.  Pelvis: She has tenderness to palpation at approximately the level of the left SI joint.  There is no tenderness to palpation on the right.  Neuro: Alert and oriented; grossly nonfocal. Normal speech. Moving upper extremities equally.  Light touch is intact in all distributions of the upper extremities.  Skin: No visible jaundice. No suspicious lesions of the visualized integuments.  Psych: Mood and affect are appropriate to given situation. Answers questions appropriately.        Labs:  Lab Results   Component Value Date    WBC 4.4 04/10/2023    HGB 10.7 (L) 04/10/2023    HCT 31.9 (L) 04/10/2023     04/10/2023     CHOL 150 01/05/2023    ALT 13 04/10/2023    AST 14 04/10/2023     04/19/2023    BUN 14.1 04/19/2023    CO2 24 04/19/2023    TSH 0.80 03/16/2023    INR 1.03 05/17/2021         Imaging:    PET/CT 4/15/23                                                                   IMPRESSION: In this patient with a history of left breast cancer,  status post palbociclib and anastrozole:  1. Evidence for disease progression compared to PET/CT from 8/12/2022.  There is increasing FDG uptake of osseous lesions in the left side of  the pelvis, concerning for progressive metastatic disease.  2. Stable hypermetabolic 1.2 cm soft tissue nodule in the left breast  adjacent to the biopsy clip.  3. Increased linear hyperdensity in the IVC adjacent to the L4  kyphoplasty, consistent with IVC PMMA cement intravasation.    Pathology:  Patient Name: RYANN MANN   MR#: [id number]   Specimen #: [id number]   Collected: 12/17/2020   Received: 12/17/2020   Reported: 12/21/2020 10:40   Ordering Phy(s): TEVIN HARVEY     For improved result formatting, select 'View Enhanced Report Format' under    Linked Documents section.     +++Original Report Follows Addendum+++     TO ORIGINAL REPORT   Status: Signed Out   Date Ordered:12/24/2020   Date Reported:12/24/2020 17:34   Signed Out By: Lorena Dover M.D., [facility]     INTERPRETATION:   Invasive carcinoma is HER2 negative (score 1+) by immunohistochemistry   (see comments)     COMMENTS:   See cytogenetics report [id number] for details of the HER2 FISH results   (subpopulation Group 4). Based on the   2018 ASCO-CAP guidelines, HER2 immunohistochemistry is performed.     Scoring system:  The ASCO-CAP scoring guidelines are used.     Score 0 = No staining is observed, or membrane staining that is incomplete    and is faint/barely perceptible and   in <=10% of tumor cells.     Score 1+ = Incomplete membrane staining that is faint/barely perceptible   and in > 10% of tumor cells     Score 2+  = Weak to moderate complete membrane staining in > 10% of tumor   cells (1).  Or, unusual patterns: (2)   moderate to intense but incomplete (basolateral or lateral) membrane   staining or (3) circumferential membrane   staining that is intense but within <=10% of tumor cells     Score 3+ = Circumferential membrane staining that is complete, intense,   and in > 10% of tumor cells     Reference:   [place] HARRISON et al. Human Epidermal Growth Factor Receptor 2 Testing in Breast    Cancer: American Society of   Clinical Oncology/College of American Pathologists Clinical Practice   Guideline Focused Update. Arch Pathol Lab   Med. 2018 May 30.     Report Name: Breast Biomarkers        Status: Submitted   Part(s) Involved:   A: Breast needle biopsy, left 12 o'clock     Synoptic Report:     HER2 by Immunohistochemistry:       - Negative (Score 1+)   Test Type:       - Food and Drug Administration (FDA) cleared (test / vendor) - [facility]   Primary Antibody:       - 4B5   Cold Ischemia and Fixation Times:       - Meet requirements specified in latest version of the ASCO / CAP   Guidelines   Cold Ischemia Time (minutes): 0 min   Fixation Time (hours): 20.2 hours   Testing Performed on Block Number(s): A1     METHODS     Fixative:         - Formalin     Image Analysis:         - Not performed     CAP eCC February 2020 Annual Release     ORIGINAL REPORT:     SPECIMEN(S):   Breast needle biopsy, left 12 o'clock     FINAL DIAGNOSIS:   Breast, left, 12:00, ultrasound guided core biopsy:   -INVASIVE BREAST CARCINOMA OF NO SPECIAL TYPE (INVASIVE DUCTAL CARCINOMA),    MEGA GRADE 3   -Ductal carcinoma in situ (DCIS), nuclear grade 2, cribriform type   -Calcifications associated with invasive carcinoma and rare benign acini   -Invasive carcinoma is estrogen receptor positive and progesterone   receptor positive by immunohistochemistry   -HER2 FISH result will be reported separately by cytogenetics   -See comment     COMMENT:   Invasive  carcinoma involves three of three tissue cores, and is 15 mm in   greatest dimension in a single core.   The [place] grade is 3 (tubule formation 3 + nuclear pleomorphism 3 +   mitotic activity 2 = Debra score   8).  There are up to 10 mitotic figures in 10 high power fields (field   diameter 0.5 mm).   DCIS is a minor component of the tumor volume.     Report Name: Breast Biomarkers        Status: Submitted Checklist Inst: 1      Last Updated By: Lorena Dover M.D., [facility], 12/21/2020   10:38:56   Part(s) Involved:   A: Breast needle biopsy, left 12 o'clock     Synoptic Report:     Estrogen Receptor (ER) Status:       - Positive (greater than 10% of cells demonstrate nuclear positivity)     Percentage of Cells with Nuclear Positivity:         90%     Average Intensity of Staining:         - Strong   Test Type:       - Food and Drug Administration (FDA) cleared (test / vendor) - [place]   Primary Antibody:       - SP1   Scoring System:       - No separate scoring system used   Progesterone Receptor (PgR) Status:       - Positive     Percentage of Cells with Nuclear Positivity:         75%     Average Intensity of Staining:         - Moderate   Test Type:       - Food and Drug Administration (FDA) cleared (test / vendor) - [place]   Primary Antibody:       - 1E2   Scoring System:       - No separate scoring system used   Cold Ischemia and Fixation Times:       - Meet requirements specified in latest version of the ASCO / CAP   Guidelines   Testing Performed on Block Number(s): A1     METHODS     Fixative:         - Formalin     Image Analysis:         - Not performed     Radiation Oncology Pre-Treatment Evaluation:  Pacemaker: denies  Prior radiation: 3000 cGy in 10 fractions to the lumbar spine with Dr. Kramer from 12/23/2020 to 1/7/2021.  Pregnancy status: Status post bilateral salpingo-oophorectomy  History of lupus, scleroderma, connective tissue disorders and collagen vascular disease:  denies  Pain: 7/10  Pain Plan: Using primarily over the counter analgesics; has access to opiates, presently using intermittently only  Intent of treatment: curative/palliative: palliative  Side Effects of Radiation: We discussed in detail the management of treatment side effects and provided information regarding the self-care of the most common side effects.    Impression and Plan:   Teresa Sanderson is a 47 year old with a history of de víctor stage IV breast cancer metastatic to bone, ER/AL positive, HER2 equivocal and approximately 35% of cells by FISH negative by IHC, most recently on palbociclib and anastrozole, with plans to change to palbociclib plus exemestane.  She is seen in consultation/reevaluation regarding the role for palliative radiation to the left iliac and acetabular lesions.  She reports 1 day of bilateral upper extremity/shoulder tenderness when she lifts her arms.  Given no corollary on recent imaging to explain these findings and short duration of onset, we will monitor this over short interval and pursue reimaging if indicated if symptoms persist.    We recommended palliative radiation to the left pelvic osseous sites.  We explained the palliative, symptom-focused intention of such therapy.  We noted that the goal was to reduce pain attributable to cancer activity at the bone site.  We specifically noted that such therapy is targeted to a specific area of the body and is not felt to be effective in impacting patient survival time.      We discussed that doses used for palliative radiation therapy are generally not ablative.  This is because out of the curative context, we are balancing a dose capable of reducing or preventing symptoms with minimizing incidental irradiation of adjacent normal structures that may result in morbidity.  Moreover, we use foreshortened courses to reduce the overall treatment duration to optimize quality of life and avoid delaying systemic therapy, other  interventions, or transitions in care such as initiation of hospice when applicable.      We discussed the risks (short and long term as listed on consent), benefits, and alternatives to radiotherapy.  The risks of radiation include but are not limited to: pain flare, skin irritation, hair loss in the area treated, fibrosis, joint stiffness, edema, wound healing difficulties,  increased risk of fracture, damage to nerves, and tumors caused by radiation.      She expressed clear understanding of the palliative intent of therapy. Ms. Sanderson is in agreement with this plan and will be scheduled for CT-based simulation for radiation planning.     Plan:  1) palliative radiotherapy to the left iliac and acetabular osseous lesions, 20 Gray in 5 fractions.  2) simulation CT was performed today in the supine position.  She would like to be treated at [facility].  Her start date is 5/1/2023.      We appreciate the opportunity to participate in Ms. Sanderson's care. She was encouraged to contact me with questions or concerns should they arise.    I personally reviewed the available PET images. Laboratory data and pathology as noted above was reviewed. I reviewed previous medical records, which are summarized in the HPI. A total of 40 minutes were spent (including face to face time) during this visit, and more than 50% of the time was spent in counseling and coordination of care.     Sangeetha Mcwilliams MD MPH PhD    Department of Radiation Oncology

## 2023-04-25 NOTE — PROGRESS NOTES
Radiation Therapy Patient Education    Person involved with teaching: Patient    Patient educational needs for self management of treatment-related side effects assessment completed.  [facility] Patient Ed tab contains Patient Learning Assessment    Education Materials Given  Radiation Therapy Guide    Educational Topics Discussed  Side effects expected, Pain management, Skin care, Activity, Nutrition and weight loss and When to call MD/RN    Response To Teaching  Verbalizes understanding    GYN Only  Vaginal Dilator-given and educated: N/A    Referrals sent: None    Chemotherapy?  No

## 2023-04-25 NOTE — LETTER
2023         RE: Teresa Sanderson  [address] [facility] [address]      FOLLOW-UP VISIT    Patient Name: Teresa Sanderson      : 1975     Age: 47 year old        ______________________________________________________________________________     Chief Complaint   Patient presents with     Radiation Therapy     Follow up and sim     /66   Pulse 61   Resp 18   Wt 114.3 kg (252 lb)   SpO2 99%   BMI 34.18 kg/m       Date Radiation Completed: Metastatic Breast Cancer: Lumbar spine 3000 cGy completed 21    Pain  Current history of pain associated with this visit:   Intensity: 7/10  Current: aching  Location: arms and glutes   Treatment: tylenol and ibuprofen     Labs  Other Labs: Yes: 2023    Imaging  PET 4/15/2023    Residual Radiation side effect: denies  Pregnant: No  Cardiac Devices: None      Additional Instructions: Proceed with simulation      2023    Teresa Sanderson  [phone] (home)   : 1975  MRN: [id number]       RADIATION ONCOLOGY CONSULT/RE-EVALUATION     CC: Arm and buttock area pain    Identification and Purpose of Visit:  Teresa Sanderson is a 47 year old with a history of de víctor stage IV breast cancer metastatic to bone, ER/LA positive, HER2 equivocal and approximately 35% of cells by FISH negative by IHC, most recently on palbociclib and anastrozole.  She is referred by Dr. Plunkett for consultation to discuss the potential role for radiotherapy for symptomatic left iliac bone metastasis.    Past radiation oncologist: Dr. Kramer  Medical oncologist: Dr. Plunkett      History of Present Illness:  Ms. Sanderson' relevant oncologic history dates back to late  when presented to [facility] ED with back pain on 2020. MRI of the L-spine showed an abnormal L4 lesion with associated right paraspinal mass, abnormalities in L5 and the left iliac bone were also seen. CT C/A/P showed a left breast mass, lytic lesions of T7, L4, and the  pelvis. Mammogram and ultrasound of the bilateral breasts on 12/17/2020 showed a spiculated mass measuring at least 7.8 cm at 12-1:00 left breast extending from the nipple to 9 cm from the nipple with associated nipple retraction. This mass was biopsied, and showed IDC with surrounding DCIS, grade 3, ER+ 90%, and AZ+ 75%.  HER2 was equivocal in approximately 35% of tumor cells by FISH and was negative by IHC.    She underwent radiotherapy to 3000 cGy in 10 fractions to the lumbar spine with Dr. Kramer from 12/23/2020 to 1/7/2021.  She then began Ibrance, Zoladex, and anastrozole on 1/29/2021.  She underwent radiofrequency ablation and kyphoplasty to L4 on 5/17/2021.  She underwent bilateral salpingo-oophorectomies on 8/20/2021. She was off anastrozole 12/2021 - 2/2022 and off Ibrance 01/2022 - 02/2022 due to stress and impending homelessness. Off both again 03/2022-04/2022 due to death of her son but restarted in 05/2022.    PET/CT on 4/15/2023 showed increasing FDG uptake of osseous lesions in the left side of the pelvis, concerning for progressive metastatic disease.  There was a stable hypermetabolic 1.2 cm soft tissue nodule in the left breast adjacent to the biopsy clip as well as  increased linear hyperdensity in the IVC adjacent to the L4 kyphoplasty, consistent with IVC PMMA cement intravasation.    She last saw her medical oncology team on 4/19/2023, with plans to change anastrozole to exemestane based on PET/CT.     Ms. Sanderson reports several weeks or more of left groin area pain.  This is not significantly worse with activity.  There is no associated radiation of pain or weakness.  Today she reports a feeling of bilateral buttock area tenderness as well. She is using a walker for balance. She is spending a lot of time in bed.  She reported her medical oncology team that she has not been using gabapentin recently. She is using MS Contin intermittently. Robaxin is helpful.  However, she reports she is  primarily using ibuprofen and Tylenol for pain.  Back pain is stable.      She also reports 1 day of significant tightness in the bilateral shoulders when she raises her arms.  There is no pain at rest.  She denies significant associated cervical neck pain.  She denies associated upper extremities weakness.  She does not recall doing any strenuous activities or other unusual physical activities in the past day to explain these symptoms.    Between both sides, pain is presently 7 out of 10 in severity.  She is declining use of stronger pain medications at this point.    She also reports significant troubles with neuropathy in the upper extremities.    She continues to work closely with psychiatry for her mood.        Review of Systems: As per HPI; otherwise, a 10 system review is unremarkable    Past Medical History:   Diagnosis Date     Anxiety      Breast CA (H) 12/2020     Depression      DVT (deep venous thrombosis) (H) 2014     Left breast mass     x approximately 4-5 months     Pulmonary emboli (H)      Pyelonephritis      Schizoaffective disorder (H)      Tobacco use         Past Surgical History:   Procedure Laterality Date     COLONOSCOPY N/A 7/8/2022    Procedure: COLONOSCOPY, WITH POLYPECTOMY;  Surgeon: Ham Cano MD;  Location: [facility] OR     IR LUMBAR KYPHOPLASTY VERTEBRAE  5/17/2021     LAPAROSCOPIC SALPINGO-OOPHORECTOMY Bilateral 8/20/2021    Procedure: BILATERAL SALPINGO-OOPHORECTOMY, LAPAROSCOPIC;  Surgeon: Rory Lopez MD;  Location: [facility] OR     TUBAL LIGATION  1998        Current Medications:    Current Outpatient Medications:      exemestane (AROMASIN) 25 MG tablet, Take 1 tablet (25 mg) by mouth daily, Disp: 90 tablet, Rfl: 3     methocarbamol (ROBAXIN) 500 MG tablet, Take 1 tablet (500 mg) by mouth 3 times daily as needed for muscle spasms, Disp: 30 tablet, Rfl: 0     morphine (MS CONTIN) 15 MG CR tablet, Take 1 tablet (15 mg) by mouth daily, Disp: 30 tablet, Rfl: 0     naloxone  (NARCAN) 4 MG/0.1ML nasal spray, Spray 1 spray (4 mg) into one nostril alternating nostrils once as needed for opioid reversal every 2-3 minutes until assistance arrives, Disp: 0.2 mL, Rfl: 0     nicotine (COMMIT) 2 MG lozenge, Place 1 lozenge (2 mg) inside cheek every hour as needed for smoking cessation, Disp: 27 lozenge, Rfl: 3     nicotine (NICORETTE) 2 MG gum, Place 1 each (2 mg) inside cheek every hour as needed for smoking cessation, Disp: 90 each, Rfl: 1     ondansetron (ZOFRAN) 8 MG tablet, Take 1 tablet (8 mg) by mouth every 8 hours as needed for nausea, Disp: 30 tablet, Rfl: 3     palbociclib (IBRANCE) 125 MG tablet, Take 1 tablet (125 mg) by mouth daily Take for 21 days, then 7 days off., Disp: 21 tablet, Rfl: 2     pantoprazole (PROTONIX) 40 MG EC tablet, Take 1 tablet (40 mg) by mouth every morning (before breakfast), Disp: 30 tablet, Rfl: 1     polyethylene glycol (MIRALAX) 17 GM/Dose powder, Take 17 g by mouth daily as needed for constipation, Disp: 578 g, Rfl: 3     prochlorperazine (COMPAZINE) 10 MG tablet, Take 1 tablet (10 mg) by mouth every 6 hours as needed for nausea or vomiting, Disp: 30 tablet, Rfl: 3     QUEtiapine (SEROQUEL) 100 MG tablet, Take 3 tablets (300 mg) by mouth At Bedtime, Disp: 90 tablet, Rfl: 1     rivaroxaban ANTICOAGULANT (XARELTO) 20 MG TABS tablet, Take 1 tablet (20 mg) by mouth daily (with dinner), Disp: 90 tablet, Rfl: 3     SENNA-docusate sodium (SENNA S) 8.6-50 MG tablet, Take 2 tablets by mouth 2 times daily as needed (Constipation), Disp: 100 tablet, Rfl: 3     sertraline (ZOLOFT) 100 MG tablet, Take one tab (100mg) by mouth every morning. Take in addition to 50 mg tablet for total dose of 150mg daily, Disp: 30 tablet, Rfl: 1     sertraline (ZOLOFT) 50 MG tablet, Take 1 tablet (50 mg) by mouth daily Take in addition to 100mg tablet for total dose of 150mg daily, Disp: 30 tablet, Rfl: 1     tolnaftate (TINACTIN) 1 % external cream, Apply topically 2 times daily, Disp:  30 g, Rfl: 1     Vitamin D3 (CHOLECALCIFEROL) 25 mcg (1000 units) tablet, Take 1 tablet (25 mcg) by mouth daily, Disp: 90 tablet, Rfl: 3     gabapentin (NEURONTIN) 300 MG capsule, Take 2 capsules (600 mg) by mouth every morning AND 2 capsules (600 mg) daily at 2 pm AND 2 capsules (600 mg) At Bedtime. Do all this for 30 days., Disp: 180 capsule, Rfl: 2    Allergies:  Allergies   Allergen Reactions     Contrast Dye      Pt developed nausea after isovue 370 injection on 6/9/21         Social History:  Social History     Socioeconomic History     Marital status: Single     Spouse name: None     Number of children: None     Years of education: None     Highest education level: None   Tobacco Use     Smoking status: Every Day     Packs/day: 0.25     Types: Cigarettes     Start date: 5/13/2011     Smokeless tobacco: Never   Substance and Sexual Activity     Alcohol use: Not Currently     Comment: occ     Drug use: Yes     Types: Marijuana     Comment: occ     Sexual activity: Not Currently     Partners: Male     Social Determinants of Health     Intimate Partner Violence: Not At Risk (7/14/2022)    Humiliation, Afraid, Rape, and Kick questionnaire      Fear of Current or Ex-Partner: No      Emotionally Abused: No      Physically Abused: No      Sexually Abused: No        Family History   Problem Relation Age of Onset     Lung Cancer Mother      Lung Cancer Father      Lupus Brother      Kidney Disease Brother      Diabetes Daughter      Asthma Son      Cardiovascular Maternal Aunt      Hypertension Other      Diabetes Other      Deep Vein Thrombosis (DVT) No family hx of          Physical Exam:  KPS: 70-80  /66   Pulse 61   Resp 18   Wt 114.3 kg (252 lb)   SpO2 99%   BMI 34.18 kg/m  , Pain Score Severe Pain (7)/10  General: Alert and in no acute distress.  HEENT: Normocephalic, atraumatic. No visible scleral icterus.  Neck: Apparent full range of motion.  CV: Appears well-perfused, with no visible  cyanosis.  Lungs: Breathing easily on room air, with no difficulty completing full sentences  Extremities:  No visible edema of the upper extremities. Full range of motion at the shoulders and elbows.  There is no significant pain to palpation at the bilateral shoulders and upper arms.  Pelvis: She has tenderness to palpation at approximately the level of the left SI joint.  There is no tenderness to palpation on the right.  Neuro: Alert and oriented; grossly nonfocal. Normal speech. Moving upper extremities equally.  Light touch is intact in all distributions of the upper extremities.  Skin: No visible jaundice. No suspicious lesions of the visualized integuments.  Psych: Mood and affect are appropriate to given situation. Answers questions appropriately.        Labs:  Lab Results   Component Value Date    WBC 4.4 04/10/2023    HGB 10.7 (L) 04/10/2023    HCT 31.9 (L) 04/10/2023     04/10/2023    CHOL 150 01/05/2023    ALT 13 04/10/2023    AST 14 04/10/2023     04/19/2023    BUN 14.1 04/19/2023    CO2 24 04/19/2023    TSH 0.80 03/16/2023    INR 1.03 05/17/2021         Imaging:    PET/CT 4/15/23                                                                   IMPRESSION: In this patient with a history of left breast cancer,  status post palbociclib and anastrozole:  1. Evidence for disease progression compared to PET/CT from 8/12/2022.  There is increasing FDG uptake of osseous lesions in the left side of  the pelvis, concerning for progressive metastatic disease.  2. Stable hypermetabolic 1.2 cm soft tissue nodule in the left breast  adjacent to the biopsy clip.  3. Increased linear hyperdensity in the IVC adjacent to the L4  kyphoplasty, consistent with IVC PMMA cement intravasation.    Pathology:  Patient Name: RYANN MANN   MR#: [id number]   Specimen #: [id number]   Collected: 12/17/2020   Received: 12/17/2020   Reported: 12/21/2020 10:40   Ordering Phy(s): TEVIN HARVEY     For improved  result formatting, select 'View Enhanced Report Format' under    Linked Documents section.     +++Original Report Follows Addendum+++     TO ORIGINAL REPORT   Status: Signed Out   Date Ordered:12/24/2020   Date Reported:12/24/2020 17:34   Signed Out By: Lorena Dover M.D., [facility]     INTERPRETATION:   Invasive carcinoma is HER2 negative (score 1+) by immunohistochemistry   (see comments)     COMMENTS:   See cytogenetics report [id number] for details of the HER2 FISH results   (subpopulation Group 4). Based on the   2018 ASCO-CAP guidelines, HER2 immunohistochemistry is performed.     Scoring system:  The ASCO-CAP scoring guidelines are used.     Score 0 = No staining is observed, or membrane staining that is incomplete    and is faint/barely perceptible and   in <=10% of tumor cells.     Score 1+ = Incomplete membrane staining that is faint/barely perceptible   and in > 10% of tumor cells     Score 2+ = Weak to moderate complete membrane staining in > 10% of tumor   cells (1).  Or, unusual patterns: (2)   moderate to intense but incomplete (basolateral or lateral) membrane   staining or (3) circumferential membrane   staining that is intense but within <=10% of tumor cells     Score 3+ = Circumferential membrane staining that is complete, intense,   and in > 10% of tumor cells     Reference:   Quincy AC et al. Human Epidermal Growth Factor Receptor 2 Testing in Breast    Cancer: American Society of   Clinical Oncology/College of American Pathologists Clinical Practice   Guideline Focused Update. Arch Pathol Lab   Med. 2018 May 30.     Report Name: Breast Biomarkers        Status: Submitted   Part(s) Involved:   A: Breast needle biopsy, left 12 o'clock     Synoptic Report:     HER2 by Immunohistochemistry:       - Negative (Score 1+)   Test Type:       - Food and Drug Administration (FDA) cleared (test / vendor) - [place]   Primary Antibody:       - 4B5   Cold Ischemia and Fixation Times:       - Meet  requirements specified in latest version of the ASCO / CAP   Guidelines   Cold Ischemia Time (minutes): 0 min   Fixation Time (hours): 20.2 hours   Testing Performed on Block Number(s): A1     METHODS     Fixative:         - Formalin     Image Analysis:         - Not performed     CAP [facility] February 2020 Annual Release     ORIGINAL REPORT:     SPECIMEN(S):   Breast needle biopsy, left 12 o'clock     FINAL DIAGNOSIS:   Breast, left, 12:00, ultrasound guided core biopsy:   -INVASIVE BREAST CARCINOMA OF NO SPECIAL TYPE (INVASIVE DUCTAL CARCINOMA),    MEGA GRADE 3   -Ductal carcinoma in situ (DCIS), nuclear grade 2, cribriform type   -Calcifications associated with invasive carcinoma and rare benign acini   -Invasive carcinoma is estrogen receptor positive and progesterone   receptor positive by immunohistochemistry   -HER2 FISH result will be reported separately by cytogenetics   -See comment     COMMENT:   Invasive carcinoma involves three of three tissue cores, and is 15 mm in   greatest dimension in a single core.   The Mega grade is 3 (tubule formation 3 + nuclear pleomorphism 3 +   mitotic activity 2 = McDavid score   8).  There are up to 10 mitotic figures in 10 high power fields (field   diameter 0.5 mm).   DCIS is a minor component of the tumor volume.     Report Name: Breast Biomarkers        Status: Submitted Checklist Inst: 1      Last Updated By: Lorena Dover M.D., [facility], 12/21/2020   10:38:56   Part(s) Involved:   A: Breast needle biopsy, left 12 o'clock     Synoptic Report:     Estrogen Receptor (ER) Status:       - Positive (greater than 10% of cells demonstrate nuclear positivity)     Percentage of Cells with Nuclear Positivity:         90%     Average Intensity of Staining:         - Strong   Test Type:       - Food and Drug Administration (FDA) cleared (test / vendor) - [place]   Primary Antibody:       - SP1   Scoring System:       - No separate scoring system used    Progesterone Receptor (PgR) Status:       - Positive     Percentage of Cells with Nuclear Positivity:         75%     Average Intensity of Staining:         - Moderate   Test Type:       - Food and Drug Administration (FDA) cleared (test / vendor) - [place]   Primary Antibody:       - 1E2   Scoring System:       - No separate scoring system used   Cold Ischemia and Fixation Times:       - Meet requirements specified in latest version of the ASCO / CAP   Guidelines   Testing Performed on Block Number(s): A1     METHODS     Fixative:         - Formalin     Image Analysis:         - Not performed     Radiation Oncology Pre-Treatment Evaluation:  Pacemaker: denies  Prior radiation: 3000 cGy in 10 fractions to the lumbar spine with Dr. Kramer from 12/23/2020 to 1/7/2021.  Pregnancy status: Status post bilateral salpingo-oophorectomy  History of lupus, scleroderma, connective tissue disorders and collagen vascular disease: denies  Pain: 7/10  Pain Plan: Using primarily over the counter analgesics; has access to opiates, presently using intermittently only  Intent of treatment: curative/palliative: palliative  Side Effects of Radiation: We discussed in detail the management of treatment side effects and provided information regarding the self-care of the most common side effects.    Impression and Plan:   Teresa Sanderson is a 47 year old with a history of de víctor stage IV breast cancer metastatic to bone, ER/TX positive, HER2 equivocal and approximately 35% of cells by FISH negative by IHC, most recently on palbociclib and anastrozole, with plans to change to palbociclib plus exemestane.  She is seen in consultation/reevaluation regarding the role for palliative radiation to the left iliac and acetabular lesions.  She reports 1 day of bilateral upper extremity/shoulder tenderness when she lifts her arms.  Given no corollary on recent imaging to explain these findings and short duration of onset, we will monitor this  over short interval and pursue reimaging if indicated if symptoms persist.    We recommended palliative radiation to the left pelvic osseous sites.  We explained the palliative, symptom-focused intention of such therapy.  We noted that the goal was to reduce pain attributable to cancer activity at the bone site.  We specifically noted that such therapy is targeted to a specific area of the body and is not felt to be effective in impacting patient survival time.      We discussed that doses used for palliative radiation therapy are generally not ablative.  This is because out of the curative context, we are balancing a dose capable of reducing or preventing symptoms with minimizing incidental irradiation of adjacent normal structures that may result in morbidity.  Moreover, we use foreshortened courses to reduce the overall treatment duration to optimize quality of life and avoid delaying systemic therapy, other interventions, or transitions in care such as initiation of hospice when applicable.      We discussed the risks (short and long term as listed on consent), benefits, and alternatives to radiotherapy.  The risks of radiation include but are not limited to: pain flare, skin irritation, hair loss in the area treated, fibrosis, joint stiffness, edema, wound healing difficulties,  increased risk of fracture, damage to nerves, and tumors caused by radiation.      She expressed clear understanding of the palliative intent of therapy. Ms. Sanderson is in agreement with this plan and will be scheduled for CT-based simulation for radiation planning.     Plan:  1) palliative radiotherapy to the left iliac and acetabular osseous lesions, 20 Gray in 5 fractions.  2) simulation CT was performed today in the supine position.  She would like to be treated at [facility].  Her start date is 5/1/2023.      We appreciate the opportunity to participate in Ms. Sanderson's care. She was encouraged to contact me with questions or concerns  should they arise.    I personally reviewed the available PET images. Laboratory data and pathology as noted above was reviewed. I reviewed previous medical records, which are summarized in the HPI. A total of 40 minutes were spent (including face to face time) during this visit, and more than 50% of the time was spent in counseling and coordination of care.     Sangeetha Mcwilliams MD MPH PhD    Department of Radiation Oncology          Sangeetha Mcwilliams

## 2023-04-25 NOTE — LETTER
4/25/2023         RE: Teresa Sanderson  [address] [facility] [address]        Dear Colleague,    Thank you for referring your patient, Teresa Sanderson, to the [facility] RADIATION ONCOLOGY. Please see a copy of my visit note below.    Radiation Therapy Patient Education    Person involved with teaching: Patient    Patient educational needs for self management of treatment-related side effects assessment completed.  EPIC Patient Ed tab contains Patient Learning Assessment    Education Materials Given  Radiation Therapy Guide    Educational Topics Discussed  Side effects expected, Pain management, Skin care, Activity, Nutrition and weight loss and When to call MD/RN    Response To Teaching  Verbalizes understanding    GYN Only  Vaginal Dilator-given and educated: N/A    Referrals sent: None    Chemotherapy?  No          Again, thank you for allowing me to participate in the care of your patient.        Sincerely,        Sangeetha Mcwilliams

## 2023-04-25 NOTE — LETTER
2023         RE: Teresa Sanderson  [address] [facility] [address]        Dear Colleague,    Thank you for referring your patient, Teresa Sanderson, to the [facility] RADIATION ONCOLOGY. Please see a copy of my visit note below.    FOLLOW-UP VISIT    Patient Name: Teresa Sanderson      : 1975     Age: 47 year old        ______________________________________________________________________________     Chief Complaint   Patient presents with     Radiation Therapy     Follow up and sim     /66   Pulse 61   Resp 18   Wt 114.3 kg (252 lb)   SpO2 99%   BMI 34.18 kg/m       Date Radiation Completed: Metastatic Breast Cancer: Lumbar spine 3000 cGy completed 21    Pain  Current history of pain associated with this visit:   Intensity: 7/10  Current: aching  Location: arms and glutes   Treatment: tylenol and ibuprofen     Labs  Other Labs: Yes: 2023    Imaging  PET 4/15/2023    Residual Radiation side effect: denies  Pregnant: No  Cardiac Devices: None      Additional Instructions: Proceed with simulation      2023    Teresa Sanderson  [phone] (home)   : 1975  MRN: [id number]       RADIATION ONCOLOGY CONSULT/RE-EVALUATION     CC: Arm and buttock area pain    Identification and Purpose of Visit:  Teresa Sanderson is a 47 year old with a history of de víctor stage IV breast cancer metastatic to bone, ER/DE positive, HER2 equivocal and approximately 35% of cells by FISH negative by IHC, most recently on palbociclib and anastrozole.  She is referred by Dr. Plunkett for consultation to discuss the potential role for radiotherapy for symptomatic left iliac bone metastasis.    Past radiation oncologist: Dr. Kramer  Medical oncologist: Dr. Plunkett      History of Present Illness:  Ms. Sanderson' relevant oncologic history dates back to late  when presented to [place] ED with back pain on 2020. MRI of the L-spine showed an abnormal L4 lesion  with associated right paraspinal mass, abnormalities in L5 and the left iliac bone were also seen. CT C/A/P showed a left breast mass, lytic lesions of T7, L4, and the pelvis. Mammogram and ultrasound of the bilateral breasts on 12/17/2020 showed a spiculated mass measuring at least 7.8 cm at 12-1:00 left breast extending from the nipple to 9 cm from the nipple with associated nipple retraction. This mass was biopsied, and showed IDC with surrounding DCIS, grade 3, ER+ 90%, and VT+ 75%.  HER2 was equivocal in approximately 35% of tumor cells by FISH and was negative by IHC.    She underwent radiotherapy to 3000 cGy in 10 fractions to the lumbar spine with Dr. Kramer from 12/23/2020 to 1/7/2021.  She then began Ibrance, Zoladex, and anastrozole on 1/29/2021.  She underwent radiofrequency ablation and kyphoplasty to L4 on 5/17/2021.  She underwent bilateral salpingo-oophorectomies on 8/20/2021. She was off anastrozole 12/2021 - 2/2022 and off Ibrance 01/2022 - 02/2022 due to stress and impending homelessness. Off both again 03/2022-04/2022 due to death of her son but restarted in 05/2022.    PET/CT on 4/15/2023 showed increasing FDG uptake of osseous lesions in the left side of the pelvis, concerning for progressive metastatic disease.  There was a stable hypermetabolic 1.2 cm soft tissue nodule in the left breast adjacent to the biopsy clip as well as  increased linear hyperdensity in the IVC adjacent to the L4 kyphoplasty, consistent with IVC PMMA cement intravasation.    She last saw her medical oncology team on 4/19/2023, with plans to change anastrozole to exemestane based on PET/CT.     Ms. Sanderson reports several weeks or more of left groin area pain.  This is not significantly worse with activity.  There is no associated radiation of pain or weakness.  Today she reports a feeling of bilateral buttock area tenderness as well. She is using a walker for balance. She is spending a lot of time in bed.  She  reported her medical oncology team that she has not been using gabapentin recently. She is using MS Contin intermittently. Robaxin is helpful.  However, she reports she is primarily using ibuprofen and Tylenol for pain.  Back pain is stable.      She also reports 1 day of significant tightness in the bilateral shoulders when she raises her arms.  There is no pain at rest.  She denies significant associated cervical neck pain.  She denies associated upper extremities weakness.  She does not recall doing any strenuous activities or other unusual physical activities in the past day to explain these symptoms.    Between both sides, pain is presently 7 out of 10 in severity.  She is declining use of stronger pain medications at this point.    She also reports significant troubles with neuropathy in the upper extremities.    She continues to work closely with psychiatry for her mood.        Review of Systems: As per HPI; otherwise, a 10 system review is unremarkable    Past Medical History:   Diagnosis Date     Anxiety      Breast CA (H) 12/2020     Depression      DVT (deep venous thrombosis) (H) 2014     Left breast mass     x approximately 4-5 months     Pulmonary emboli (H)      Pyelonephritis      Schizoaffective disorder (H)      Tobacco use         Past Surgical History:   Procedure Laterality Date     COLONOSCOPY N/A 7/8/2022    Procedure: COLONOSCOPY, WITH POLYPECTOMY;  Surgeon: Ham Cano MD;  Location: [facility] OR     IR LUMBAR KYPHOPLASTY VERTEBRAE  5/17/2021     LAPAROSCOPIC SALPINGO-OOPHORECTOMY Bilateral 8/20/2021    Procedure: BILATERAL SALPINGO-OOPHORECTOMY, LAPAROSCOPIC;  Surgeon: Rory Lopez MD;  Location: [facility] OR     TUBAL LIGATION  1998        Current Medications:    Current Outpatient Medications:      exemestane (AROMASIN) 25 MG tablet, Take 1 tablet (25 mg) by mouth daily, Disp: 90 tablet, Rfl: 3     methocarbamol (ROBAXIN) 500 MG tablet, Take 1 tablet (500 mg) by mouth 3 times daily  as needed for muscle spasms, Disp: 30 tablet, Rfl: 0     morphine (MS CONTIN) 15 MG CR tablet, Take 1 tablet (15 mg) by mouth daily, Disp: 30 tablet, Rfl: 0     naloxone (NARCAN) 4 MG/0.1ML nasal spray, Spray 1 spray (4 mg) into one nostril alternating nostrils once as needed for opioid reversal every 2-3 minutes until assistance arrives, Disp: 0.2 mL, Rfl: 0     nicotine (COMMIT) 2 MG lozenge, Place 1 lozenge (2 mg) inside cheek every hour as needed for smoking cessation, Disp: 27 lozenge, Rfl: 3     nicotine (NICORETTE) 2 MG gum, Place 1 each (2 mg) inside cheek every hour as needed for smoking cessation, Disp: 90 each, Rfl: 1     ondansetron (ZOFRAN) 8 MG tablet, Take 1 tablet (8 mg) by mouth every 8 hours as needed for nausea, Disp: 30 tablet, Rfl: 3     palbociclib (IBRANCE) 125 MG tablet, Take 1 tablet (125 mg) by mouth daily Take for 21 days, then 7 days off., Disp: 21 tablet, Rfl: 2     pantoprazole (PROTONIX) 40 MG EC tablet, Take 1 tablet (40 mg) by mouth every morning (before breakfast), Disp: 30 tablet, Rfl: 1     polyethylene glycol (MIRALAX) 17 GM/Dose powder, Take 17 g by mouth daily as needed for constipation, Disp: 578 g, Rfl: 3     prochlorperazine (COMPAZINE) 10 MG tablet, Take 1 tablet (10 mg) by mouth every 6 hours as needed for nausea or vomiting, Disp: 30 tablet, Rfl: 3     QUEtiapine (SEROQUEL) 100 MG tablet, Take 3 tablets (300 mg) by mouth At Bedtime, Disp: 90 tablet, Rfl: 1     rivaroxaban ANTICOAGULANT (XARELTO) 20 MG TABS tablet, Take 1 tablet (20 mg) by mouth daily (with dinner), Disp: 90 tablet, Rfl: 3     SENNA-docusate sodium (SENNA S) 8.6-50 MG tablet, Take 2 tablets by mouth 2 times daily as needed (Constipation), Disp: 100 tablet, Rfl: 3     sertraline (ZOLOFT) 100 MG tablet, Take one tab (100mg) by mouth every morning. Take in addition to 50 mg tablet for total dose of 150mg daily, Disp: 30 tablet, Rfl: 1     sertraline (ZOLOFT) 50 MG tablet, Take 1 tablet (50 mg) by mouth daily  Take in addition to 100mg tablet for total dose of 150mg daily, Disp: 30 tablet, Rfl: 1     tolnaftate (TINACTIN) 1 % external cream, Apply topically 2 times daily, Disp: 30 g, Rfl: 1     Vitamin D3 (CHOLECALCIFEROL) 25 mcg (1000 units) tablet, Take 1 tablet (25 mcg) by mouth daily, Disp: 90 tablet, Rfl: 3     gabapentin (NEURONTIN) 300 MG capsule, Take 2 capsules (600 mg) by mouth every morning AND 2 capsules (600 mg) daily at 2 pm AND 2 capsules (600 mg) At Bedtime. Do all this for 30 days., Disp: 180 capsule, Rfl: 2    Allergies:  Allergies   Allergen Reactions     Contrast Dye      Pt developed nausea after isovue 370 injection on 6/9/21         Social History:  Social History     Socioeconomic History     Marital status: Single     Spouse name: None     Number of children: None     Years of education: None     Highest education level: None   Tobacco Use     Smoking status: Every Day     Packs/day: 0.25     Types: Cigarettes     Start date: 5/13/2011     Smokeless tobacco: Never   Substance and Sexual Activity     Alcohol use: Not Currently     Comment: occ     Drug use: Yes     Types: Marijuana     Comment: occ     Sexual activity: Not Currently     Partners: Male     Social Determinants of Health     Intimate Partner Violence: Not At Risk (7/14/2022)    Humiliation, Afraid, Rape, and Kick questionnaire      Fear of Current or Ex-Partner: No      Emotionally Abused: No      Physically Abused: No      Sexually Abused: No        Family History   Problem Relation Age of Onset     Lung Cancer Mother      Lung Cancer Father      Lupus Brother      Kidney Disease Brother      Diabetes Daughter      Asthma Son      Cardiovascular Maternal Aunt      Hypertension Other      Diabetes Other      Deep Vein Thrombosis (DVT) No family hx of          Physical Exam:  KPS: 70-80  /66   Pulse 61   Resp 18   Wt 114.3 kg (252 lb)   SpO2 99%   BMI 34.18 kg/m  , Pain Score Severe Pain (7)/10  General: Alert and in no  acute distress.  HEENT: Normocephalic, atraumatic. No visible scleral icterus.  Neck: Apparent full range of motion.  CV: Appears well-perfused, with no visible cyanosis.  Lungs: Breathing easily on room air, with no difficulty completing full sentences  Extremities:  No visible edema of the upper extremities. Full range of motion at the shoulders and elbows.  There is no significant pain to palpation at the bilateral shoulders and upper arms.  Pelvis: She has tenderness to palpation at approximately the level of the left SI joint.  There is no tenderness to palpation on the right.  Neuro: Alert and oriented; grossly nonfocal. Normal speech. Moving upper extremities equally.  Light touch is intact in all distributions of the upper extremities.  Skin: No visible jaundice. No suspicious lesions of the visualized integuments.  Psych: Mood and affect are appropriate to given situation. Answers questions appropriately.        Labs:  Lab Results   Component Value Date    WBC 4.4 04/10/2023    HGB 10.7 (L) 04/10/2023    HCT 31.9 (L) 04/10/2023     04/10/2023    CHOL 150 01/05/2023    ALT 13 04/10/2023    AST 14 04/10/2023     04/19/2023    BUN 14.1 04/19/2023    CO2 24 04/19/2023    TSH 0.80 03/16/2023    INR 1.03 05/17/2021         Imaging:    PET/CT 4/15/23                                                                   IMPRESSION: In this patient with a history of left breast cancer,  status post palbociclib and anastrozole:  1. Evidence for disease progression compared to PET/CT from 8/12/2022.  There is increasing FDG uptake of osseous lesions in the left side of  the pelvis, concerning for progressive metastatic disease.  2. Stable hypermetabolic 1.2 cm soft tissue nodule in the left breast  adjacent to the biopsy clip.  3. Increased linear hyperdensity in the IVC adjacent to the L4  kyphoplasty, consistent with IVC PMMA cement intravasation.    Pathology:  Patient Name: RYANN MANN   MR#:  [id number]   Specimen #: [id number]   Collected: 12/17/2020   Received: 12/17/2020   Reported: 12/21/2020 10:40   Ordering Phy(s): TEVIN HARVEY     For improved result formatting, select 'View Enhanced Report Format' under    Linked Documents section.     +++Original Report Follows Addendum+++     TO ORIGINAL REPORT   Status: Signed Out   Date Ordered:12/24/2020   Date Reported:12/24/2020 17:34   Signed Out By: Lorena Dover M.D., [facility]     INTERPRETATION:   Invasive carcinoma is HER2 negative (score 1+) by immunohistochemistry   (see comments)     COMMENTS:   See cytogenetics report [id number] for details of the HER2 FISH results   (subpopulation Group 4). Based on the   2018 ASCO-CAP guidelines, HER2 immunohistochemistry is performed.     Scoring system:  The ASCO-CAP scoring guidelines are used.     Score 0 = No staining is observed, or membrane staining that is incomplete    and is faint/barely perceptible and   in <=10% of tumor cells.     Score 1+ = Incomplete membrane staining that is faint/barely perceptible   and in > 10% of tumor cells     Score 2+ = Weak to moderate complete membrane staining in > 10% of tumor   cells (1).  Or, unusual patterns: (2)   moderate to intense but incomplete (basolateral or lateral) membrane   staining or (3) circumferential membrane   staining that is intense but within <=10% of tumor cells     Score 3+ = Circumferential membrane staining that is complete, intense,   and in > 10% of tumor cells     Reference:   [place] HARRISON et al. Human Epidermal Growth Factor Receptor 2 Testing in Breast    Cancer: American Society of   Clinical Oncology/College of American Pathologists Clinical Practice   Guideline Focused Update. Arch Pathol Lab   Med. 2018 May 30.     Report Name: Breast Biomarkers        Status: Submitted   Part(s) Involved:   A: Breast needle biopsy, left 12 o'clock     Synoptic Report:     HER2 by Immunohistochemistry:       - Negative (Score 1+)   Test Type:        - Food and Drug Administration (FDA) cleared (test / vendor) - [place]   Primary Antibody:       - 4B5   Cold Ischemia and Fixation Times:       - Meet requirements specified in latest version of the ASCO / CAP   Guidelines   Cold Ischemia Time (minutes): 0 min   Fixation Time (hours): 20.2 hours   Testing Performed on Block Number(s): A1     METHODS     Fixative:         - Formalin     Image Analysis:         - Not performed     CAP eCC February 2020 Annual Release     ORIGINAL REPORT:     SPECIMEN(S):   Breast needle biopsy, left 12 o'clock     FINAL DIAGNOSIS:   Breast, left, 12:00, ultrasound guided core biopsy:   -INVASIVE BREAST CARCINOMA OF NO SPECIAL TYPE (INVASIVE DUCTAL CARCINOMA),    MEGA GRADE 3   -Ductal carcinoma in situ (DCIS), nuclear grade 2, cribriform type   -Calcifications associated with invasive carcinoma and rare benign acini   -Invasive carcinoma is estrogen receptor positive and progesterone   receptor positive by immunohistochemistry   -HER2 FISH result will be reported separately by cytogenetics   -See comment     COMMENT:   Invasive carcinoma involves three of three tissue cores, and is 15 mm in   greatest dimension in a single core.   The Mega grade is 3 (tubule formation 3 + nuclear pleomorphism 3 +   mitotic activity 2 = [place] score   8).  There are up to 10 mitotic figures in 10 high power fields (field   diameter 0.5 mm).   DCIS is a minor component of the tumor volume.     Report Name: Breast Biomarkers        Status: Submitted Checklist Inst: 1      Last Updated By: Lorena Dover M.D., [facility], 12/21/2020   10:38:56   Part(s) Involved:   A: Breast needle biopsy, left 12 o'clock     Synoptic Report:     Estrogen Receptor (ER) Status:       - Positive (greater than 10% of cells demonstrate nuclear positivity)     Percentage of Cells with Nuclear Positivity:         90%     Average Intensity of Staining:         - Strong   Test Type:       - Food and Drug  Administration (FDA) cleared (test / vendor) - [place]   Primary Antibody:       - SP1   Scoring System:       - No separate scoring system used   Progesterone Receptor (PgR) Status:       - Positive     Percentage of Cells with Nuclear Positivity:         75%     Average Intensity of Staining:         - Moderate   Test Type:       - Food and Drug Administration (FDA) cleared (test / vendor) - [employer]   Primary Antibody:       - 1E2   Scoring System:       - No separate scoring system used   Cold Ischemia and Fixation Times:       - Meet requirements specified in latest version of the ASCO / CAP   Guidelines   Testing Performed on Block Number(s): A1     METHODS     Fixative:         - Formalin     Image Analysis:         - Not performed     Radiation Oncology Pre-Treatment Evaluation:  Pacemaker: denies  Prior radiation: 3000 cGy in 10 fractions to the lumbar spine with Dr. Kramer from 12/23/2020 to 1/7/2021.  Pregnancy status: Status post bilateral salpingo-oophorectomy  History of lupus, scleroderma, connective tissue disorders and collagen vascular disease: denies  Pain: 7/10  Pain Plan: Using primarily over the counter analgesics; has access to opiates, presently using intermittently only  Intent of treatment: curative/palliative: palliative  Side Effects of Radiation: We discussed in detail the management of treatment side effects and provided information regarding the self-care of the most common side effects.    Impression and Plan:   Teresa Sanderson is a 47 year old with a history of de víctor stage IV breast cancer metastatic to bone, ER/MA positive, HER2 equivocal and approximately 35% of cells by FISH negative by IHC, most recently on palbociclib and anastrozole, with plans to change to palbociclib plus exemestane.  She is seen in consultation/reevaluation regarding the role for palliative radiation to the left iliac and acetabular lesions.  She reports 1 day of bilateral upper extremity/shoulder  tenderness when she lifts her arms.  Given no corollary on recent imaging to explain these findings and short duration of onset, we will monitor this over short interval and pursue reimaging if indicated if symptoms persist.    We recommended palliative radiation to the left pelvic osseous sites.  We explained the palliative, symptom-focused intention of such therapy.  We noted that the goal was to reduce pain attributable to cancer activity at the bone site.  We specifically noted that such therapy is targeted to a specific area of the body and is not felt to be effective in impacting patient survival time.      We discussed that doses used for palliative radiation therapy are generally not ablative.  This is because out of the curative context, we are balancing a dose capable of reducing or preventing symptoms with minimizing incidental irradiation of adjacent normal structures that may result in morbidity.  Moreover, we use foreshortened courses to reduce the overall treatment duration to optimize quality of life and avoid delaying systemic therapy, other interventions, or transitions in care such as initiation of hospice when applicable.      We discussed the risks (short and long term as listed on consent), benefits, and alternatives to radiotherapy.  The risks of radiation include but are not limited to: pain flare, skin irritation, hair loss in the area treated, fibrosis, joint stiffness, edema, wound healing difficulties,  increased risk of fracture, damage to nerves, and tumors caused by radiation.      She expressed clear understanding of the palliative intent of therapy. Ms. Sanderson is in agreement with this plan and will be scheduled for CT-based simulation for radiation planning.     Plan:  1) palliative radiotherapy to the left iliac and acetabular osseous lesions, 20 Gray in 5 fractions.  2) simulation CT was performed today in the supine position.  She would like to be treated at [facility].  Her start  date is 5/1/2023.      We appreciate the opportunity to participate in Ms. Sanderson's care. She was encouraged to contact me with questions or concerns should they arise.    I personally reviewed the available PET images. Laboratory data and pathology as noted above was reviewed. I reviewed previous medical records, which are summarized in the HPI. A total of 40 minutes were spent (including face to face time) during this visit, and more than 50% of the time was spent in counseling and coordination of care.     Sangeetha Mcwilliams MD MPH PhD    Department of Radiation Oncology        Again, thank you for allowing me to participate in the care of your patient.        Sincerely,        Sangeetha Mcwilliams

## 2023-05-01 ENCOUNTER — APPOINTMENT (OUTPATIENT)
Dept: RADIATION ONCOLOGY | Facility: CLINIC | Age: 48
End: 2023-05-01
Attending: RADIOLOGY
Payer: MEDICARE

## 2023-05-01 PROCEDURE — 77412 RADIATION TX DELIVERY LVL 3: CPT | Performed by: RADIOLOGY

## 2023-05-01 PROCEDURE — 77280 THER RAD SIMULAJ FIELD SMPL: CPT | Performed by: RADIOLOGY

## 2023-05-01 PROCEDURE — 77280 THER RAD SIMULAJ FIELD SMPL: CPT | Mod: 26 | Performed by: RADIOLOGY

## 2023-05-02 ENCOUNTER — APPOINTMENT (OUTPATIENT)
Dept: RADIATION ONCOLOGY | Facility: CLINIC | Age: 48
End: 2023-05-02
Attending: RADIOLOGY
Payer: MEDICARE

## 2023-05-02 ENCOUNTER — TELEPHONE (OUTPATIENT)
Dept: PSYCHIATRY | Facility: CLINIC | Age: 48
End: 2023-05-02
Payer: MEDICARE

## 2023-05-02 PROCEDURE — 77412 RADIATION TX DELIVERY LVL 3: CPT | Performed by: RADIOLOGY

## 2023-05-02 PROCEDURE — 77387 GUIDANCE FOR RADJ TX DLVR: CPT | Performed by: RADIOLOGY

## 2023-05-02 PROCEDURE — 77014 PR CT GUIDE FOR PLACEMENT RADIATION THERAPY FIELDS: CPT | Mod: 26 | Performed by: RADIOLOGY

## 2023-05-03 ENCOUNTER — APPOINTMENT (OUTPATIENT)
Dept: RADIATION ONCOLOGY | Facility: CLINIC | Age: 48
End: 2023-05-03
Attending: RADIOLOGY
Payer: MEDICARE

## 2023-05-03 PROCEDURE — 77412 RADIATION TX DELIVERY LVL 3: CPT | Performed by: RADIOLOGY

## 2023-05-03 PROCEDURE — 77387 GUIDANCE FOR RADJ TX DLVR: CPT | Performed by: RADIOLOGY

## 2023-05-03 PROCEDURE — 77014 PR CT GUIDE FOR PLACEMENT RADIATION THERAPY FIELDS: CPT | Mod: 26 | Performed by: RADIOLOGY

## 2023-05-04 ENCOUNTER — APPOINTMENT (OUTPATIENT)
Dept: RADIATION ONCOLOGY | Facility: CLINIC | Age: 48
End: 2023-05-04
Attending: RADIOLOGY
Payer: MEDICARE

## 2023-05-04 ENCOUNTER — TELEPHONE (OUTPATIENT)
Dept: PSYCHIATRY | Facility: CLINIC | Age: 48
End: 2023-05-04
Payer: MEDICARE

## 2023-05-04 PROCEDURE — 77412 RADIATION TX DELIVERY LVL 3: CPT | Performed by: RADIOLOGY

## 2023-05-04 PROCEDURE — 77387 GUIDANCE FOR RADJ TX DLVR: CPT | Performed by: RADIOLOGY

## 2023-05-04 PROCEDURE — 77014 PR CT GUIDE FOR PLACEMENT RADIATION THERAPY FIELDS: CPT | Mod: 26 | Performed by: RADIOLOGY

## 2023-05-04 NOTE — TELEPHONE ENCOUNTER
SVETA placed phone call to [facility] to follow up on request for community resources.    Left voicemail requesting call back. SVETA also will send My Chart message.     Patient also being followed by oncology social worker.    DANA Rajput, [facility]  [phone]

## 2023-05-05 ENCOUNTER — OFFICE VISIT (OUTPATIENT)
Dept: RADIATION ONCOLOGY | Facility: CLINIC | Age: 48
End: 2023-05-05
Attending: RADIOLOGY
Payer: MEDICARE

## 2023-05-05 VITALS
HEART RATE: 76 BPM | RESPIRATION RATE: 18 BRPM | BODY MASS INDEX: 34.06 KG/M2 | SYSTOLIC BLOOD PRESSURE: 136 MMHG | WEIGHT: 251.1 LBS | DIASTOLIC BLOOD PRESSURE: 96 MMHG

## 2023-05-05 DIAGNOSIS — C79.51 CARCINOMA OF BREAST METASTATIC TO BONE, UNSPECIFIED LATERALITY (H): Primary | ICD-10-CM

## 2023-05-05 DIAGNOSIS — C50.919 CARCINOMA OF BREAST METASTATIC TO BONE, UNSPECIFIED LATERALITY (H): Primary | ICD-10-CM

## 2023-05-05 PROCEDURE — 77336 RADIATION PHYSICS CONSULT: CPT | Performed by: RADIOLOGY

## 2023-05-05 PROCEDURE — 77412 RADIATION TX DELIVERY LVL 3: CPT | Performed by: RADIOLOGY

## 2023-05-05 PROCEDURE — 77427 RADIATION TX MANAGEMENT X5: CPT | Mod: GC | Performed by: RADIOLOGY

## 2023-05-05 PROCEDURE — 77387 GUIDANCE FOR RADJ TX DLVR: CPT | Performed by: RADIOLOGY

## 2023-05-05 PROCEDURE — 77014 PR CT GUIDE FOR PLACEMENT RADIATION THERAPY FIELDS: CPT | Mod: 26 | Performed by: RADIOLOGY

## 2023-05-05 ASSESSMENT — PAIN SCALES - GENERAL: PAINLEVEL: SEVERE PAIN (7)

## 2023-05-05 NOTE — LETTER
"    2023         RE: Teresa Sanderson  [address] [facility] [address]        Dear Colleague,    Thank you for referring your patient, Teresa Sanderson, to the [facility] RADIATION ONCOLOGY. Please see a copy of my visit note below.    [facility]  SPECIALIZING IN BREAKTHROUGHS  Radiation Oncology    On Treatment Visit Note      Teresa Sanderson      Date: 2023   MRN: [id number]   : 1975  Diagnosis: metastic breast cancer      Reason for Visit:  On Radiation Treatment Visit     Treatment Summary to Date    Treatment Site: L iliac Current Dose: 2000/2000 cGy Fractions: 5/5      Chemotherapy  Chemo concurrent with radx?: No    ED visits/Hospitalization: No    Unplanned Treatment Breaks: No    Subjective: Ms. Sanderson tolerated her prescribed treatment well. Earlier today, she had an incidence of fall as her left leg \"fell asleep\". She has a 7/10 pain in her left hip in addition to the baseline intermittent pain, which has been stable.    Nursing ROS:   Nutrition Alteration  Diet Type: Patient's Preference  Skin  Skin Reaction: 0 - No changes  CNS Alteration  CNS note: WNL     Cardiovascular  Respiratory effort: 1 - Normal - without distress     Psychosocial  Mood - Anxiety: 0 - Normal  Pain Assessment  0-10 Pain Scale: 7  Pain Descriptors: Aching  Pain Treatment: heat and voltaren cream  Pain Note: This pain 7/10 is in regard to the pain felt on the left side of her body following her fall outside today.  Outside of this pain, the Pt usually experiences intermittent pain in the treated area that is 10/10 and lasts for 2 hours and then subsides with a hotpad/heat and voltaren cream.  This pain can occur a few times/week.    Objective:   BP (!) 136/96 (BP Location: Right arm)   Pulse 76   Resp 18   Wt 113.9 kg (251 lb 1.6 oz)   BMI 34.06 kg/m    Gen: Appears well, in no acute distress  Skin: No erythema    Labs:  CBC RESULTS: Recent Labs   Lab Test " 04/10/23  1150   WBC 4.4   RBC 3.15*   HGB 10.7*   HCT 31.9*   *   MCH 34.0*   MCHC 33.5   RDW 15.0        ELECTROLYTES:  Recent Labs   Lab Test 04/19/23  1148      POTASSIUM 4.0   CHLORIDE 108*   NANDO 9.1   CO2 24   BUN 14.1   CR 0.86   *       Assessment:    Tolerating radiation therapy well.  All questions and concerns addressed.    Toxicities:  None    Plan:   Video visit in 6 weeks for symptom check up.    Mosaiq chart and setup information reviewed  Ports checked    Medication Review  Med list reviewed with patient?: Yes  Med Note: No changes indicated per Patient    Educational Topic Discussed  Education Instructions: Reviewed    The patient was seen and discussed with staff, Dr. Mcwilliams.    Cb Meeks MD  Resident, PGY-5  Department of Radiation Oncology  [facility]    Physician Attestation   I, Sangeetha Mcwilliams, saw this patient and agree with the findings and plan of care as documented in the note.   I was present for key portions of the history and physical exam.    I personally reviewed the available treatment setup images and vital signs listed.     Sangeetha Mcwilliams MD MPH PhD    Department of Radiation Oncology

## 2023-05-05 NOTE — PROGRESS NOTES
"[facility] PHYSICIANS  SPECIALIZING IN BREAKTHROUGHS  Radiation Oncology    On Treatment Visit Note      Teresa Sanderson      Date: 2023   MRN: [id number]   : 1975  Diagnosis: metastic breast cancer      Reason for Visit:  On Radiation Treatment Visit     Treatment Summary to Date    Treatment Site: L iliac Current Dose: 2000/2000 cGy Fractions: 5/5      Chemotherapy  Chemo concurrent with radx?: No    ED visits/Hospitalization: No    Unplanned Treatment Breaks: No    Subjective: Ms. Sanderson tolerated her prescribed treatment well. Earlier today, she had an incidence of fall as her left leg \"fell asleep\". She has a 7/10 pain in her left hip in addition to the baseline intermittent pain, which has been stable.    Nursing ROS:   Nutrition Alteration  Diet Type: Patient's Preference  Skin  Skin Reaction: 0 - No changes  CNS Alteration  CNS note: WNL     Cardiovascular  Respiratory effort: 1 - Normal - without distress     Psychosocial  Mood - Anxiety: 0 - Normal  Pain Assessment  0-10 Pain Scale: 7  Pain Descriptors: Aching  Pain Treatment: heat and voltaren cream  Pain Note: This pain 7/10 is in regard to the pain felt on the left side of her body following her fall outside today.  Outside of this pain, the Pt usually experiences intermittent pain in the treated area that is 10/10 and lasts for 2 hours and then subsides with a hotpad/heat and voltaren cream.  This pain can occur a few times/week.    Objective:   BP (!) 136/96 (BP Location: Right arm)   Pulse 76   Resp 18   Wt 113.9 kg (251 lb 1.6 oz)   BMI 34.06 kg/m    Gen: Appears well, in no acute distress  Skin: No erythema    Labs:  CBC RESULTS: Recent Labs   Lab Test 04/10/23  1150   WBC 4.4   RBC 3.15*   HGB 10.7*   HCT 31.9*   *   MCH 34.0*   MCHC 33.5   RDW 15.0        ELECTROLYTES:  Recent Labs   Lab Test 23  1148      POTASSIUM 4.0   CHLORIDE 108*   NANDO 9.1   CO2 24   BUN 14.1   CR 0.86   * "       Assessment:    Tolerating radiation therapy well.  All questions and concerns addressed.    Toxicities:  None    Plan:   1. Video visit in 6 weeks for symptom check up.    Mosaiq chart and setup information reviewed  Ports checked    Medication Review  Med list reviewed with patient?: Yes  Med Note: No changes indicated per Patient    Educational Topic Discussed  Education Instructions: Reviewed    The patient was seen and discussed with staff, Dr. Mcwilliams.    Cb Meeks MD  Resident, PGY-5  Department of Radiation Oncology  [facility]    Physician Attestation   I, Sangeetha Mcwilliams, saw this patient and agree with the findings and plan of care as documented in the note.   I was present for key portions of the history and physical exam.    I personally reviewed the available treatment setup images and vital signs listed.     Sangeetha Mcwilliams MD MPH PhD    Department of Radiation Oncology

## 2023-05-08 ENCOUNTER — HEALTH MAINTENANCE LETTER (OUTPATIENT)
Age: 48
End: 2023-05-08

## 2023-05-10 DIAGNOSIS — C79.51 CARCINOMA OF LEFT BREAST METASTATIC TO BONE (H): Primary | ICD-10-CM

## 2023-05-10 DIAGNOSIS — C50.912 CARCINOMA OF LEFT BREAST METASTATIC TO BONE (H): Primary | ICD-10-CM

## 2023-05-10 DIAGNOSIS — C50.812 MALIGNANT NEOPLASM OF OVERLAPPING SITES OF LEFT BREAST IN FEMALE, ESTROGEN RECEPTOR POSITIVE (H): ICD-10-CM

## 2023-05-10 DIAGNOSIS — Z17.0 MALIGNANT NEOPLASM OF OVERLAPPING SITES OF LEFT BREAST IN FEMALE, ESTROGEN RECEPTOR POSITIVE (H): ICD-10-CM

## 2023-05-10 RX ORDER — ANASTROZOLE 1 MG/1
1 TABLET ORAL DAILY
Qty: 90 TABLET | Refills: 3 | Status: CANCELLED | OUTPATIENT
Start: 2023-05-10

## 2023-05-11 ENCOUNTER — ONCOLOGY VISIT (OUTPATIENT)
Dept: RADIATION ONCOLOGY | Facility: CLINIC | Age: 48
End: 2023-05-11
Payer: MEDICARE

## 2023-05-11 NOTE — Clinical Note
5/11/2023         RE: Teresa Sanderson  [address] [address]        Dear Colleague,    Thank you for referring your patient, Teresa Sanderson, to the [facility] RADIATION ONCOLOGY. Please see a copy of my visit note below.    No notes on file    Again, thank you for allowing me to participate in the care of your patient.        Sincerely,        Sangeetha Mcwilliams

## 2023-05-12 ENCOUNTER — TELEPHONE (OUTPATIENT)
Dept: PSYCHIATRY | Facility: CLINIC | Age: 48
End: 2023-05-12
Payer: MEDICARE

## 2023-05-15 ENCOUNTER — LAB (OUTPATIENT)
Dept: LAB | Facility: CLINIC | Age: 48
End: 2023-05-15
Attending: PHYSICIAN ASSISTANT
Payer: MEDICARE

## 2023-05-15 DIAGNOSIS — C50.912 CARCINOMA OF LEFT BREAST METASTATIC TO BONE (H): ICD-10-CM

## 2023-05-15 DIAGNOSIS — C79.51 CARCINOMA OF LEFT BREAST METASTATIC TO BONE (H): ICD-10-CM

## 2023-05-15 DIAGNOSIS — Z51.11 ENCOUNTER FOR ANTINEOPLASTIC CHEMOTHERAPY: ICD-10-CM

## 2023-05-15 LAB
ALBUMIN SERPL BCG-MCNC: 4.4 G/DL (ref 3.5–5.2)
ALP SERPL-CCNC: 111 U/L (ref 35–104)
ALT SERPL W P-5'-P-CCNC: 39 U/L (ref 10–35)
ANION GAP SERPL CALCULATED.3IONS-SCNC: 10 MMOL/L (ref 7–15)
AST SERPL W P-5'-P-CCNC: 15 U/L (ref 10–35)
BASOPHILS # BLD AUTO: 0.1 10E3/UL (ref 0–0.2)
BASOPHILS NFR BLD AUTO: 2 %
BILIRUB SERPL-MCNC: 0.2 MG/DL
BUN SERPL-MCNC: 15.5 MG/DL (ref 6–20)
CALCIUM SERPL-MCNC: 10.1 MG/DL (ref 8.6–10)
CANCER AG15-3 SERPL-ACNC: 48 U/ML
CEA SERPL-MCNC: 3.6 NG/ML
CHLORIDE SERPL-SCNC: 110 MMOL/L (ref 98–107)
CREAT SERPL-MCNC: 0.95 MG/DL (ref 0.51–0.95)
DEPRECATED HCO3 PLAS-SCNC: 24 MMOL/L (ref 22–29)
EOSINOPHIL # BLD AUTO: 0.1 10E3/UL (ref 0–0.7)
EOSINOPHIL NFR BLD AUTO: 3 %
ERYTHROCYTE [DISTWIDTH] IN BLOOD BY AUTOMATED COUNT: 14.9 % (ref 10–15)
GFR SERPL CREATININE-BSD FRML MDRD: 74 ML/MIN/1.73M2
GLUCOSE SERPL-MCNC: 123 MG/DL (ref 70–99)
HCT VFR BLD AUTO: 35.6 % (ref 35–47)
HGB BLD-MCNC: 12 G/DL (ref 11.7–15.7)
IMM GRANULOCYTES # BLD: 0 10E3/UL
IMM GRANULOCYTES NFR BLD: 1 %
LYMPHOCYTES # BLD AUTO: 1.2 10E3/UL (ref 0.8–5.3)
LYMPHOCYTES NFR BLD AUTO: 35 %
MCH RBC QN AUTO: 34.1 PG (ref 26.5–33)
MCHC RBC AUTO-ENTMCNC: 33.7 G/DL (ref 31.5–36.5)
MCV RBC AUTO: 101 FL (ref 78–100)
MONOCYTES # BLD AUTO: 0.2 10E3/UL (ref 0–1.3)
MONOCYTES NFR BLD AUTO: 6 %
NEUTROPHILS # BLD AUTO: 1.8 10E3/UL (ref 1.6–8.3)
NEUTROPHILS NFR BLD AUTO: 53 %
NRBC # BLD AUTO: 0 10E3/UL
NRBC BLD AUTO-RTO: 0 /100
PLATELET # BLD AUTO: 253 10E3/UL (ref 150–450)
POTASSIUM SERPL-SCNC: 4.2 MMOL/L (ref 3.4–5.3)
PROT SERPL-MCNC: 7.8 G/DL (ref 6.4–8.3)
RBC # BLD AUTO: 3.52 10E6/UL (ref 3.8–5.2)
SODIUM SERPL-SCNC: 144 MMOL/L (ref 136–145)
WBC # BLD AUTO: 3.3 10E3/UL (ref 4–11)

## 2023-05-15 PROCEDURE — 82378 CARCINOEMBRYONIC ANTIGEN: CPT

## 2023-05-15 PROCEDURE — 85025 COMPLETE CBC W/AUTO DIFF WBC: CPT

## 2023-05-15 PROCEDURE — 86300 IMMUNOASSAY TUMOR CA 15-3: CPT

## 2023-05-15 PROCEDURE — 82435 ASSAY OF BLOOD CHLORIDE: CPT

## 2023-05-15 PROCEDURE — 80053 COMPREHEN METABOLIC PANEL: CPT

## 2023-05-15 PROCEDURE — 36415 COLL VENOUS BLD VENIPUNCTURE: CPT

## 2023-05-15 NOTE — NURSING NOTE
Chief Complaint   Patient presents with     Blood Draw     Labs drawn via  by RN.     Labs collected from venipuncture by RN. Vitals taken. Checked in for appointment(s).    Zane Hicks RN

## 2023-05-17 NOTE — PROGRESS NOTES
Oncology Visit:   Date on this visit: May 18, 2023    Diagnosis:  ER positive left breast cancer metastasized to bones.     Primary Physician: No Ref-Primary, Physician     History Of Present Illness:    Ms. Sanderson is a 47 year old female with a h/o tobacco abuse and DVTs with left breast cancer metastasized to bone. She presented to [place] ED with back pain on 12/5/2020. MRI of the L-spine showed an abnormal L4 lesion with associated right paraspinal mass, abnormalities in L5 and the left iliac bone were also seen. CT C/A/P showed a left breast mass, lytic lesions of T7, L4, and the pelvis, and a 3 cm lesion in the kidney (thought to be a cyst). Ultrasound of the bilateral lower extremities showed a non-occlusive thrombus in the left popliteal vein. Mammogram and ultrasound of the bilateral breasts on 12/17/2020 showed a spiculated mass measuring at least 7.8 cm at 12-1:00 left breast extending from the nipple to 9 cm from the nipple with associated nipple retraction. This mass was biopsied, and showed IDC with surrounding DCIS, grade 3, ER+ 90%, and LA+ 75%.  HER2 was equivocal in approximately 35% of tumor cells by FISH and was negative by IHC.    Metastatic Breast Cancer Treatment:  12/23/2021 - 1/7/2021  Radiation (3000 cGy) to the lumbar spine.  1/29/2021 - present  Ibrance, zoladex, and anastrozole.  5/17/2021 radiofrequency ablation, kyphoplasty to L4  8/20/2021  Bilateral salpingo-oophorectomies, Ibrance and anastrozole.  She was off anastrozole 12/2021 - 2/2022 and off Ibrance 01/2022 - 02/2022 due to stress and impending homelessness. Off both again 03/2022-04/2022 due to death of her son but restarted in 05/2022.    Interval History: Teresa is feeling okay. Her L groin pain is about the same after radiation. She fell twice the last day of radiation. Frequently feels like her legs are going to give out. Back pain is constant but more mild and has unchanged for a while. No n/t. She frequently has  cramps in L leg. Is still spending a lot of time in bed. Has more grief as it has been now one year since her son passed away. Her other son, who is her PCA, has been a great help with motivating her to get out of the house and be more active. She is open to starting PT now.     Has been taking exemestane consistently with her Ibrance. Has noticed more hot flashes otherwise tolerating well. No fevers/chills. No nausea. Bowels have been a little more constipation recently.        Past Medical/Surgical History:   Past Medical History:   Diagnosis Date     Anxiety      Breast CA (H) 12/2020     Depression      DVT (deep venous thrombosis) (H) 2014     Left breast mass     x approximately 4-5 months     Pulmonary emboli (H)      Pyelonephritis      Schizoaffective disorder (H)      Tobacco use      Past Surgical History:   Procedure Laterality Date     COLONOSCOPY N/A 7/8/2022    Procedure: COLONOSCOPY, WITH POLYPECTOMY;  Surgeon: Ham Cano MD;  Location: [facility] OR     IR LUMBAR KYPHOPLASTY VERTEBRAE  5/17/2021     LAPAROSCOPIC SALPINGO-OOPHORECTOMY Bilateral 8/20/2021    Procedure: BILATERAL SALPINGO-OOPHORECTOMY, LAPAROSCOPIC;  Surgeon: Rory Lopez MD;  Location: [facility] OR     TUBAL LIGATION  1998        Allergies   Allergen Reactions     Contrast Dye      Pt developed nausea after isovue 370 injection on 6/9/21        Current Outpatient Medications   Medication     SENNA-docusate sodium (SENNA S) 8.6-50 MG tablet     exemestane (AROMASIN) 25 MG tablet     gabapentin (NEURONTIN) 300 MG capsule     methocarbamol (ROBAXIN) 500 MG tablet     morphine (MS CONTIN) 15 MG CR tablet     naloxone (NARCAN) 4 MG/0.1ML nasal spray     nicotine (COMMIT) 2 MG lozenge     nicotine (NICORETTE) 2 MG gum     ondansetron (ZOFRAN) 8 MG tablet     palbociclib (IBRANCE) 125 MG tablet     palbociclib (IBRANCE) 125 MG tablet     pantoprazole (PROTONIX) 40 MG EC tablet     polyethylene glycol (MIRALAX) 17 GM/Dose powder      prochlorperazine (COMPAZINE) 10 MG tablet     QUEtiapine (SEROQUEL) 100 MG tablet     rivaroxaban ANTICOAGULANT (XARELTO) 20 MG TABS tablet     sertraline (ZOLOFT) 100 MG tablet     sertraline (ZOLOFT) 50 MG tablet     tolnaftate (TINACTIN) 1 % external cream     Vitamin D3 (CHOLECALCIFEROL) 25 mcg (1000 units) tablet     No current facility-administered medications for this visit.   '  Physical Exam:   BP (!) 154/92   Pulse 70   Temp 98.4  F (36.9  C) (Oral)   Resp 16   Wt 114.5 kg (252 lb 6.4 oz)   SpO2 100%   BMI 34.23 kg/m     Wt Readings from Last 4 Encounters:   05/18/23 114.5 kg (252 lb 6.4 oz)   05/05/23 113.9 kg (251 lb 1.6 oz)   04/25/23 114.3 kg (252 lb)   04/19/23 110.7 kg (244 lb 1.6 oz)   General:  Very pleasant. Alert and oriented x 3.  HEENT:  Normocephalic.  Sclera anicteric. Enlarged thyroid on exam.  MMM.  Lymph:  No palpable cervical, supraclavicular, or axillary LAD. No inguinal LAD.   Chest:  CTA bilaterally.    CV:  RRR.   Abd:  Soft/NT/ND. + BS. Hyperpigmentation LLQ secondary to radiation   Ext:  No edema of the bilateral lower extremities.    Musculo: No spinal tenderness.    Neuro:  Cranial nerves grossly intact.   Psych:  Mood and affect appear normal.    Laboratory/Imaging Studies:    05/18/23 12:52   Sodium 140   Potassium 4.0   Chloride 107   Carbon Dioxide (CO2) 23   Urea Nitrogen 7.2   Creatinine 0.76   GFR Estimate >90   Calcium 9.4   Anion Gap 10   Albumin 4.3   Protein Total 7.6   Alkaline Phosphatase 107 (H)   ALT 26   AST 18   Bilirubin Total 0.3   Glucose 128 (H)   WBC 2.7 (L)   Hemoglobin 11.7   Hematocrit 34.6 (L)   Platelet Count 250   RBC Count 3.45 (L)      MCH 33.9 (H)   MCHC 33.8   RDW 14.2   % Neutrophils 58   % Lymphocytes 31   % Monocytes 6   % Eosinophils 3   % Basophils 2   Absolute Basophils 0.1   Absolute Eosinophils 0.1   Absolute Immature Granulocytes 0.0   Absolute Lymphocytes 0.9   Absolute Monocytes 0.2   % Immature Granulocytes 0   Absolute  Neutrophils 1.6   Absolute NRBCs 0.0   NRBCs per 100 WBC 0       PET/CT 4/15/23                                                                   IMPRESSION: In this patient with a history of left breast cancer,  status post palbociclib and anastrozole:  1. Evidence for disease progression compared to PET/CT from 8/12/2022.  There is increasing FDG uptake of osseous lesions in the left side of  the pelvis, concerning for progressive metastatic disease.  2. Stable hypermetabolic 1.2 cm soft tissue nodule in the left breast  adjacent to the biopsy clip.  3. Increased linear hyperdensity in the IVC adjacent to the L4  kyphoplasty, consistent with IVC PMMA cement intravasation.     I have personally reviewed the examination and initial interpretation  and I agree with the findings.     MALENA FARAH MD       ASSESSMENT/PLAN:   47 year old female with history of DVT and ER positive left breast cancer metastasized to bones.    1.  Metastatic breast cancer: She has been on treatment with palbociclib and anastrozole. PET/CT on 8/12/2022 with stable disease.  Of note, she was scheduled for repeat PET/CT on 2/10, however, did not show for the appointment. She had PET done in April showing two small bone metastasis in L pelvis. She was continued on palbociclib but anastrozole was changed to exemestane.   - She completed palliative radiation 5/5/23 to L iliac mets.   - Continue to monitor monthly tumor markers. Have been overall stable.   - Monthly follow-up and PET in 2 months.   - At time of progression would consider next line treatment with abemaciclib and fulvestrant.    2. Left hip pain: New bone mets as above. Completed XRT. Hopefully pain will improve in some time. Continue MS Contin--she did not need a refill, as well as robaxin and gabapentin. Tylenol PRN okay.   -Deconditioning and instability--will refer to cancer rehab PT/OT.     3.  Schizoaffective disorder, bipolar type: She is on treatment with Zoloft and  Seroquel.  Ongoing follow up with psychiatry.     4.  Bone metastases/back pain:  She is s/p XRT to the lumbar spine and kyphoplasty of L4--there was some extravasation of cement which procedularist is aware of and recommended continued AC indefinitely.   - Receiving Zometa once every 3 months. Next due mid July.     5.  DVT:  She continues on Xarelto.    6.  Enlarged thyroid: Biopsy on 1/23 c/w thyroiditis.    Greater than 40 minutes was spent with this patient with greater than 20 minutes spent in counseling and coordination of care.    Alee Yang PA-C

## 2023-05-17 NOTE — PROCEDURES
Radiotherapy Treatment Summary          Date of Report: May 11, 2023     PATIENT: RYANN MANN  MEDICAL RECORD NO: [id number]  : 1975     DIAGNOSIS: C79.51 Secondary malignant neoplasm of bone  INTENT OF RADIOTHERAPY: palliative  PATHOLOGY: invasive ductal carcinoma, ER/MN positive, HER2 negative  STAGE: metastatic  CONCURRENT SYSTEMIC THERAPY: Palbociclib and Anastrozole     Details of the treatments summarized below are found in records kept in the Department of Radiation Oncology at [facility].     Treatment Summary:  Treatment Site  Current Dose Modality From  To  Elapsed Days Fx.  L iliac    2,000 cGy 18 X  5/01/2023  2023   4   5          Dose per Fraction: 400 cGy     COMMENTS:   Ms. Mann is a 48yo with metastatic breast cancer that initially presented with back pain in . Staging   evaluation showed a left breast mass and lytic lesions of T7, L4 and pelvis. The L spine was treated with   radiation 30Gy (). She was started on systemic therapy with Ibrance, Zoladex and Anastrozole with   intermittent breaks due to social issues. She underwent bilateral salpingo-oophorectomies. More recently, PET   showed left pelvis disease progression with associated left groin pain. She was referred for palliative radiation   to the left pelvis for pain control. The left iliac metastasis received 20Gy in 5 once daily fractions with 18MV   photons via 3D conformal technique with two coplanar beams, anterior posterior and right posterior oblique.   During treatment, she continued to have left hip pain, treated with heat and Voltaren cream. She completed   radiation without other issues.     ED visits/hospitalizations: none  Missed treatments: none  Acute Toxicity Profile by CTC v5.0: pain, grade 2     PAIN MANAGEMENT: Voltaren cream, heat  FOLLOW UP PLAN: See Dr. Mcwilliams on 23, see Dr. Plunkett on 23     Resident Physician:  Zahra Longoria M.D.   Staff Physician: Sangeetha Mcwilliams M.D.  CC:  Dame Meri MD                               Radiation Oncology:  [facility] [address] [place] [address]

## 2023-05-18 ENCOUNTER — APPOINTMENT (OUTPATIENT)
Dept: LAB | Facility: CLINIC | Age: 48
End: 2023-05-18
Attending: PHYSICIAN ASSISTANT
Payer: MEDICARE

## 2023-05-18 ENCOUNTER — ONCOLOGY VISIT (OUTPATIENT)
Dept: ONCOLOGY | Facility: CLINIC | Age: 48
End: 2023-05-18
Attending: PHYSICIAN ASSISTANT
Payer: MEDICARE

## 2023-05-18 VITALS
BODY MASS INDEX: 34.23 KG/M2 | TEMPERATURE: 98.4 F | HEART RATE: 70 BPM | SYSTOLIC BLOOD PRESSURE: 154 MMHG | OXYGEN SATURATION: 100 % | DIASTOLIC BLOOD PRESSURE: 92 MMHG | RESPIRATION RATE: 16 BRPM | WEIGHT: 252.4 LBS

## 2023-05-18 DIAGNOSIS — K59.03 THERAPEUTIC OPIOID INDUCED CONSTIPATION: ICD-10-CM

## 2023-05-18 DIAGNOSIS — C50.812 MALIGNANT NEOPLASM OF OVERLAPPING SITES OF LEFT BREAST IN FEMALE, ESTROGEN RECEPTOR POSITIVE (H): ICD-10-CM

## 2023-05-18 DIAGNOSIS — C50.912 CARCINOMA OF LEFT BREAST METASTATIC TO BONE (H): Primary | ICD-10-CM

## 2023-05-18 DIAGNOSIS — R53.81 PHYSICAL DECONDITIONING: ICD-10-CM

## 2023-05-18 DIAGNOSIS — T40.2X5A THERAPEUTIC OPIOID INDUCED CONSTIPATION: ICD-10-CM

## 2023-05-18 DIAGNOSIS — C79.51 CARCINOMA OF LEFT BREAST METASTATIC TO BONE (H): Primary | ICD-10-CM

## 2023-05-18 DIAGNOSIS — Z17.0 MALIGNANT NEOPLASM OF OVERLAPPING SITES OF LEFT BREAST IN FEMALE, ESTROGEN RECEPTOR POSITIVE (H): ICD-10-CM

## 2023-05-18 DIAGNOSIS — Z91.041 CONTRAST MEDIA ALLERGY: ICD-10-CM

## 2023-05-18 LAB
ALBUMIN SERPL BCG-MCNC: 4.3 G/DL (ref 3.5–5.2)
ALP SERPL-CCNC: 107 U/L (ref 35–104)
ALT SERPL W P-5'-P-CCNC: 26 U/L (ref 10–35)
ANION GAP SERPL CALCULATED.3IONS-SCNC: 10 MMOL/L (ref 7–15)
AST SERPL W P-5'-P-CCNC: 18 U/L (ref 10–35)
BASOPHILS # BLD AUTO: 0.1 10E3/UL (ref 0–0.2)
BASOPHILS NFR BLD AUTO: 2 %
BILIRUB SERPL-MCNC: 0.3 MG/DL
BUN SERPL-MCNC: 7.2 MG/DL (ref 6–20)
CALCIUM SERPL-MCNC: 9.4 MG/DL (ref 8.6–10)
CHLORIDE SERPL-SCNC: 107 MMOL/L (ref 98–107)
CREAT SERPL-MCNC: 0.76 MG/DL (ref 0.51–0.95)
DEPRECATED HCO3 PLAS-SCNC: 23 MMOL/L (ref 22–29)
EOSINOPHIL # BLD AUTO: 0.1 10E3/UL (ref 0–0.7)
EOSINOPHIL NFR BLD AUTO: 3 %
ERYTHROCYTE [DISTWIDTH] IN BLOOD BY AUTOMATED COUNT: 14.2 % (ref 10–15)
GFR SERPL CREATININE-BSD FRML MDRD: >90 ML/MIN/1.73M2
GLUCOSE SERPL-MCNC: 128 MG/DL (ref 70–99)
HCT VFR BLD AUTO: 34.6 % (ref 35–47)
HGB BLD-MCNC: 11.7 G/DL (ref 11.7–15.7)
IMM GRANULOCYTES # BLD: 0 10E3/UL
IMM GRANULOCYTES NFR BLD: 0 %
LYMPHOCYTES # BLD AUTO: 0.9 10E3/UL (ref 0.8–5.3)
LYMPHOCYTES NFR BLD AUTO: 31 %
MCH RBC QN AUTO: 33.9 PG (ref 26.5–33)
MCHC RBC AUTO-ENTMCNC: 33.8 G/DL (ref 31.5–36.5)
MCV RBC AUTO: 100 FL (ref 78–100)
MONOCYTES # BLD AUTO: 0.2 10E3/UL (ref 0–1.3)
MONOCYTES NFR BLD AUTO: 6 %
NEUTROPHILS # BLD AUTO: 1.6 10E3/UL (ref 1.6–8.3)
NEUTROPHILS NFR BLD AUTO: 58 %
NRBC # BLD AUTO: 0 10E3/UL
NRBC BLD AUTO-RTO: 0 /100
PLATELET # BLD AUTO: 250 10E3/UL (ref 150–450)
POTASSIUM SERPL-SCNC: 4 MMOL/L (ref 3.4–5.3)
PROT SERPL-MCNC: 7.6 G/DL (ref 6.4–8.3)
RBC # BLD AUTO: 3.45 10E6/UL (ref 3.8–5.2)
SODIUM SERPL-SCNC: 140 MMOL/L (ref 136–145)
WBC # BLD AUTO: 2.7 10E3/UL (ref 4–11)

## 2023-05-18 PROCEDURE — G0463 HOSPITAL OUTPT CLINIC VISIT: HCPCS | Performed by: PHYSICIAN ASSISTANT

## 2023-05-18 PROCEDURE — 82310 ASSAY OF CALCIUM: CPT | Performed by: PHYSICIAN ASSISTANT

## 2023-05-18 PROCEDURE — 85025 COMPLETE CBC W/AUTO DIFF WBC: CPT | Performed by: PHYSICIAN ASSISTANT

## 2023-05-18 PROCEDURE — 99215 OFFICE O/P EST HI 40 MIN: CPT | Performed by: PHYSICIAN ASSISTANT

## 2023-05-18 PROCEDURE — 36415 COLL VENOUS BLD VENIPUNCTURE: CPT | Performed by: PHYSICIAN ASSISTANT

## 2023-05-18 RX ORDER — METHYLPREDNISOLONE 32 MG/1
32 TABLET ORAL DAILY
Qty: 2 TABLET | Refills: 0 | Status: SHIPPED | OUTPATIENT
Start: 2023-05-18 | End: 2024-07-20

## 2023-05-18 RX ORDER — SENNA AND DOCUSATE SODIUM 50; 8.6 MG/1; MG/1
2 TABLET, FILM COATED ORAL 2 TIMES DAILY PRN
Qty: 100 TABLET | Refills: 3 | Status: SHIPPED | OUTPATIENT
Start: 2023-05-18 | End: 2024-07-20

## 2023-05-18 ASSESSMENT — PAIN SCALES - GENERAL: PAINLEVEL: SEVERE PAIN (6)

## 2023-05-18 NOTE — LETTER
5/18/2023       RE: Teresa Sanderson  [address] [address]      Dear Colleague,    Thank you for referring your patient, Teresa Sanderson, to the [facility] CANCER CLINIC. Please see a copy of my visit note below.    Oncology Visit:   Date on this visit: May 18, 2023    Diagnosis:  ER positive left breast cancer metastasized to bones.     Primary Physician: No Ref-Primary, Physician     History Of Present Illness:    Ms. Sanderson is a 47 year old female with a h/o tobacco abuse and DVTs with left breast cancer metastasized to bone. She presented to [place] ED with back pain on 12/5/2020. MRI of the L-spine showed an abnormal L4 lesion with associated right paraspinal mass, abnormalities in L5 and the left iliac bone were also seen. CT C/A/P showed a left breast mass, lytic lesions of T7, L4, and the pelvis, and a 3 cm lesion in the kidney (thought to be a cyst). Ultrasound of the bilateral lower extremities showed a non-occlusive thrombus in the left popliteal vein. Mammogram and ultrasound of the bilateral breasts on 12/17/2020 showed a spiculated mass measuring at least 7.8 cm at 12-1:00 left breast extending from the nipple to 9 cm from the nipple with associated nipple retraction. This mass was biopsied, and showed IDC with surrounding DCIS, grade 3, ER+ 90%, and NE+ 75%.  HER2 was equivocal in approximately 35% of tumor cells by FISH and was negative by IHC.    Metastatic Breast Cancer Treatment:  12/23/2021 - 1/7/2021  Radiation (3000 cGy) to the lumbar spine.  1/29/2021 - present  Ibrance, zoladex, and anastrozole.  5/17/2021 radiofrequency ablation, kyphoplasty to L4  8/20/2021  Bilateral salpingo-oophorectomies, Ibrance and anastrozole.  She was off anastrozole 12/2021 - 2/2022 and off Ibrance 01/2022 - 02/2022 due to stress and impending homelessness. Off both again 03/2022-04/2022 due to death of her son but restarted in 05/2022.    Interval History: Teresa purdy  feeling okay. Her L groin pain is about the same after radiation. She fell twice the last day of radiation. Frequently feels like her legs are going to give out. Back pain is constant but more mild and has unchanged for a while. No n/t. She frequently has cramps in L leg. Is still spending a lot of time in bed. Has more grief as it has been now one year since her son passed away. Her other son, who is her PCA, has been a great help with motivating her to get out of the house and be more active. She is open to starting PT now.     Has been taking exemestane consistently with her Ibrance. Has noticed more hot flashes otherwise tolerating well. No fevers/chills. No nausea. Bowels have been a little more constipation recently.        Past Medical/Surgical History:   Past Medical History:   Diagnosis Date    Anxiety     Breast CA (H) 12/2020    Depression     DVT (deep venous thrombosis) (H) 2014    Left breast mass     x approximately 4-5 months    Pulmonary emboli (H)     Pyelonephritis     Schizoaffective disorder (H)     Tobacco use      Past Surgical History:   Procedure Laterality Date    COLONOSCOPY N/A 7/8/2022    Procedure: COLONOSCOPY, WITH POLYPECTOMY;  Surgeon: Ham Cano MD;  Location: [facility] OR    IR LUMBAR KYPHOPLASTY VERTEBRAE  5/17/2021    LAPAROSCOPIC SALPINGO-OOPHORECTOMY Bilateral 8/20/2021    Procedure: BILATERAL SALPINGO-OOPHORECTOMY, LAPAROSCOPIC;  Surgeon: Rory Lopez MD;  Location: [facility] OR    TUBAL LIGATION  1998        Allergies   Allergen Reactions    Contrast Dye      Pt developed nausea after isovue 370 injection on 6/9/21        Current Outpatient Medications   Medication    SENNA-docusate sodium (SENNA S) 8.6-50 MG tablet    exemestane (AROMASIN) 25 MG tablet    gabapentin (NEURONTIN) 300 MG capsule    methocarbamol (ROBAXIN) 500 MG tablet    morphine (MS CONTIN) 15 MG CR tablet    naloxone (NARCAN) 4 MG/0.1ML nasal spray    nicotine (COMMIT) 2 MG lozenge    nicotine  (NICORETTE) 2 MG gum    ondansetron (ZOFRAN) 8 MG tablet    palbociclib (IBRANCE) 125 MG tablet    palbociclib (IBRANCE) 125 MG tablet    pantoprazole (PROTONIX) 40 MG EC tablet    polyethylene glycol (MIRALAX) 17 GM/Dose powder    prochlorperazine (COMPAZINE) 10 MG tablet    QUEtiapine (SEROQUEL) 100 MG tablet    rivaroxaban ANTICOAGULANT (XARELTO) 20 MG TABS tablet    sertraline (ZOLOFT) 100 MG tablet    sertraline (ZOLOFT) 50 MG tablet    tolnaftate (TINACTIN) 1 % external cream    Vitamin D3 (CHOLECALCIFEROL) 25 mcg (1000 units) tablet     No current facility-administered medications for this visit.   '  Physical Exam:   BP (!) 154/92   Pulse 70   Temp 98.4  F (36.9  C) (Oral)   Resp 16   Wt 114.5 kg (252 lb 6.4 oz)   SpO2 100%   BMI 34.23 kg/m     Wt Readings from Last 4 Encounters:   05/18/23 114.5 kg (252 lb 6.4 oz)   05/05/23 113.9 kg (251 lb 1.6 oz)   04/25/23 114.3 kg (252 lb)   04/19/23 110.7 kg (244 lb 1.6 oz)   General:  Very pleasant. Alert and oriented x 3.  HEENT:  Normocephalic.  Sclera anicteric. Enlarged thyroid on exam.  MMM.  Lymph:  No palpable cervical, supraclavicular, or axillary LAD. No inguinal LAD.   Chest:  CTA bilaterally.    CV:  RRR.   Abd:  Soft/NT/ND. + BS. Hyperpigmentation LLQ secondary to radiation   Ext:  No edema of the bilateral lower extremities.    Musculo: No spinal tenderness.    Neuro:  Cranial nerves grossly intact.   Psych:  Mood and affect appear normal.    Laboratory/Imaging Studies:    05/18/23 12:52   Sodium 140   Potassium 4.0   Chloride 107   Carbon Dioxide (CO2) 23   Urea Nitrogen 7.2   Creatinine 0.76   GFR Estimate >90   Calcium 9.4   Anion Gap 10   Albumin 4.3   Protein Total 7.6   Alkaline Phosphatase 107 (H)   ALT 26   AST 18   Bilirubin Total 0.3   Glucose 128 (H)   WBC 2.7 (L)   Hemoglobin 11.7   Hematocrit 34.6 (L)   Platelet Count 250   RBC Count 3.45 (L)      MCH 33.9 (H)   MCHC 33.8   RDW 14.2   % Neutrophils 58   % Lymphocytes 31   %  Monocytes 6   % Eosinophils 3   % Basophils 2   Absolute Basophils 0.1   Absolute Eosinophils 0.1   Absolute Immature Granulocytes 0.0   Absolute Lymphocytes 0.9   Absolute Monocytes 0.2   % Immature Granulocytes 0   Absolute Neutrophils 1.6   Absolute NRBCs 0.0   NRBCs per 100 WBC 0       PET/CT 4/15/23                                                                   IMPRESSION: In this patient with a history of left breast cancer,  status post palbociclib and anastrozole:  1. Evidence for disease progression compared to PET/CT from 8/12/2022.  There is increasing FDG uptake of osseous lesions in the left side of  the pelvis, concerning for progressive metastatic disease.  2. Stable hypermetabolic 1.2 cm soft tissue nodule in the left breast  adjacent to the biopsy clip.  3. Increased linear hyperdensity in the IVC adjacent to the L4  kyphoplasty, consistent with IVC PMMA cement intravasation.     I have personally reviewed the examination and initial interpretation  and I agree with the findings.     MALENA FARAH MD       ASSESSMENT/PLAN:   47 year old female with history of DVT and ER positive left breast cancer metastasized to bones.    1.  Metastatic breast cancer: She has been on treatment with palbociclib and anastrozole. PET/CT on 8/12/2022 with stable disease.  Of note, she was scheduled for repeat PET/CT on 2/10, however, did not show for the appointment. She had PET done in April showing two small bone metastasis in L pelvis. She was continued on palbociclib but anastrozole was changed to exemestane.   - She completed palliative radiation 5/5/23 to L iliac mets.   - Continue to monitor monthly tumor markers. Have been overall stable.   - Monthly follow-up and PET in 2 months.   - At time of progression would consider next line treatment with abemaciclib and fulvestrant.    2. Left hip pain: New bone mets as above. Completed XRT. Hopefully pain will improve in some time. Continue MS Contin--she did  not need a refill, as well as robaxin and gabapentin. Tylenol PRN okay.   -Deconditioning and instability--will refer to cancer rehab PT/OT.     3.  Schizoaffective disorder, bipolar type: She is on treatment with Zoloft and Seroquel.  Ongoing follow up with psychiatry.     4.  Bone metastases/back pain:  She is s/p XRT to the lumbar spine and kyphoplasty of L4--there was some extravasation of cement which procedularist is aware of and recommended continued AC indefinitely.   - Receiving Zometa once every 3 months. Next due mid July.     5.  DVT:  She continues on Xarelto.    6.  Enlarged thyroid: Biopsy on 1/23 c/w thyroiditis.    Greater than 40 minutes was spent with this patient with greater than 20 minutes spent in counseling and coordination of care.  Alee Yang PA-C

## 2023-05-24 ENCOUNTER — TELEPHONE (OUTPATIENT)
Dept: PSYCHIATRY | Facility: CLINIC | Age: 48
End: 2023-05-24
Payer: MEDICARE

## 2023-05-24 NOTE — TELEPHONE ENCOUNTER
SW called [employer] to follow up on well-being and resources. Left voicemail with phone number if [employer] wants support in connecting to resources.    DANA Rajput, [facility]  [phone]

## 2023-06-01 ENCOUNTER — TRANSFERRED RECORDS (OUTPATIENT)
Dept: MULTI SPECIALTY CLINIC | Facility: CLINIC | Age: 48
End: 2023-06-01

## 2023-06-12 NOTE — PROGRESS NOTES
Teresa is a 47 year old who is being evaluated via a billable video visit.        Radiation Oncology Follow-up Visit  2023    Teresa Sanderson  MRN: [id number]   : 1975     DISEASE TREATED:   Metastatic breast cancer, HR+/HER2-    RADIATION THERAPY DELIVERED:   1. 30 Gy in 10 fractions to L3 --L5, completed on 21  2. 20 Gy in 5 fractions to left ilium, completed on 23    INTERVAL SINCE COMPLETION OF RADIATION THERAPY:   6 weeks    ONCOLOGICAL HISTORY:   Teresa Sanderson is a 47 year old female with metastatic breast cancer that initially presented with back pain in 2020. Staging evaluation showed a left breast mass and lytic lesions of T7, L4 and pelvis. Her lumbar spine was treated with palliative radiation to a dose of 30Gy (2021). She was started on systemic therapy with Ibrance, Zoladex and Anastrozole with intermittent breaks due to social issues. She underwent bilateral salpingo-oophorectomies for ovarian suppression. More recently, restaging PET/CT showed left pelvis disease progression with associated left groin pain. She underwent palliative radiotherapy in this context for pain control. She was continued on Ibrance, Zoladex but Anastrozole was switched to Exemestane. She last seen in Medical Oncology clinic for follow up on 23, and at the time she did not find improvement/worsening in her left groin pain per chart review.     Ms. Sanderson is reached over video conference for symptom checkup at 6 weeks out post radiation.  Unfortunately, she has noted no appreciable change to pain at the left pelvis site.  Pain continues to be primarily experienced in the left groin area without radiation.  Severity is moderate.  She spends a lot of time in bed these days, which she has done for some time.  She uses Neurontin and MS Contin for pain control, with some effect.    Physical exam: Limited by telemedicine nature of the evaluation  KPS 70  General: Alert and in no acute  distress.  HEENT: Normocephalic, atraumatic. No visible scleral icterus.  Neck: Apparent full range of motion.  CV: Appears well-perfused, with no visible cyanosis.  Lungs: Breathing easily on room air, with no difficulty completing full sentences  Extremities:  No visible edema of the upper extremities. Full range of motion at the shoulders and elbows.    Neuro: Alert and oriented; grossly nonfocal. Normal speech. Moving upper extremities equally.  Skin: No visible jaundice. No suspicious lesions of the visualized integuments.  Psych: Mood and affect are appropriate to given situation. Answers questions appropriately.       LABS AND IMAGING:  CBC RESULTS: Recent Labs   Lab Test 05/18/23  1252   WBC 2.7*   RBC 3.45*   HGB 11.7   HCT 34.6*      MCH 33.9*   MCHC 33.8   RDW 14.2        ELECTROLYTES:   Recent Labs   Lab Test 05/18/23  1252      POTASSIUM 4.0   CHLORIDE 107   NANDO 9.4   CO2 23   BUN 7.2   CR 0.76   *       IMPRESSION:   Ms. Sanderson is a 47 year old with a history of de víctor stage IV breast cancer metastatic to bone, ER/MA positive, HER2 equivocal and approximately 35% of cells by FISH negative by IHC, most recently on palbociclib and anastrozole, with plans to change to palbociclib plus exemestane.  Her past radiotherapy history is notable for palliative radiation to 30 Gy in 10 fractions to L3 to L5, completed on 1/7/21.  She was most recently treated with palliative radiotherapy for painful osseous metastasis to 20 Gy in 5 fractions to left ilium, completed on 5/5/23.  She is seen in routine follow-up approximately 6 weeks following completion of this course of radiotherapy, with stable and unchanged pain following completion of this treatment.  She has a restaging PET/CT, which we can monitor to assess whether additional radiotherapy would be dictated should progression be evident.  However, we noted that pain can be multifactorial, with cancer involvement, degenerative  disease, neurological compromise, and instability all contributing to the pain experience.  If there is less FDG avidity at her treatment site, we would assume that additional factors may be contributing to her present pain experience.    PLAN:   1. Restaging PET/CT on 7/13/23, follow up with Medical Oncology afterwards to discuss results and plan of care.  2.  If there is progression of disease at the treatment site, repeat irradiation could be considered.  3.  We will not schedule a routine follow-up at this time, but instead invited Ms. Sanderson to reach out to us could we be of assistance.  We also welcomed the medical oncology team to reach out if needed in the future.    Video-Visit Details    Originating Location (pt. Location): Home    Distant Location (provider location):  On-site      We appreciate the opportunity to participate in Ms. Sanderson's care. She was encouraged to contact me with questions or concerns should they arise.    I reviewed previous medical records, which are summarized in the HPI. A total of 20 minutes were spent (including face to face time) during this visit, and more than 50% of the time was spent in counseling and coordination of care.     Sangeetha Mcwilliams MD MPH PhD    Department of Radiation Oncology

## 2023-06-13 ENCOUNTER — VIRTUAL VISIT (OUTPATIENT)
Dept: RADIATION ONCOLOGY | Facility: CLINIC | Age: 48
End: 2023-06-13
Attending: RADIOLOGY
Payer: MEDICARE

## 2023-06-13 DIAGNOSIS — C79.51 CARCINOMA OF BREAST METASTATIC TO BONE, UNSPECIFIED LATERALITY (H): Primary | ICD-10-CM

## 2023-06-13 DIAGNOSIS — C50.919 CARCINOMA OF BREAST METASTATIC TO BONE, UNSPECIFIED LATERALITY (H): Primary | ICD-10-CM

## 2023-06-13 NOTE — LETTER
2023         RE: Teresa Sanderson  [address] [facility] [address]      Teresa is a 47 year old who is being evaluated via a billable video visit.        Radiation Oncology Follow-up Visit  2023    Teresa Sanderson  MRN: [id number]   : 1975     DISEASE TREATED:   Metastatic breast cancer, HR+/HER2-    RADIATION THERAPY DELIVERED:   1. 30 Gy in 10 fractions to L3 --L5, completed on 21  2. 20 Gy in 5 fractions to left ilium, completed on 23    INTERVAL SINCE COMPLETION OF RADIATION THERAPY:   6 weeks    ONCOLOGICAL HISTORY:   Teresa Sanderson is a 47 year old female with metastatic breast cancer that initially presented with back pain in 2020. Staging evaluation showed a left breast mass and lytic lesions of T7, L4 and pelvis. Her lumbar spine was treated with palliative radiation to a dose of 30Gy (2021). She was started on systemic therapy with Ibrance, Zoladex and Anastrozole with intermittent breaks due to social issues. She underwent bilateral salpingo-oophorectomies for ovarian suppression. More recently, restaging PET/CT showed left pelvis disease progression with associated left groin pain. She underwent palliative radiotherapy in this context for pain control. She was continued on Ibrance, Zoladex but Anastrozole was switched to Exemestane. She last seen in Medical Oncology clinic for follow up on 23, and at the time she did not find improvement/worsening in her left groin pain per chart review.     Ms. aSnderson is reached over video conference for symptom checkup at 6 weeks out post radiation.  Unfortunately, she has noted no appreciable change to pain at the left pelvis site.  Pain continues to be primarily experienced in the left groin area without radiation.  Severity is moderate.  She spends a lot of time in bed these days, which she has done for some time.  She uses Neurontin and MS Contin for pain control, with some effect.    Physical exam:  Limited by telemedicine nature of the evaluation  KPS 70  General: Alert and in no acute distress.  HEENT: Normocephalic, atraumatic. No visible scleral icterus.  Neck: Apparent full range of motion.  CV: Appears well-perfused, with no visible cyanosis.  Lungs: Breathing easily on room air, with no difficulty completing full sentences  Extremities:  No visible edema of the upper extremities. Full range of motion at the shoulders and elbows.    Neuro: Alert and oriented; grossly nonfocal. Normal speech. Moving upper extremities equally.  Skin: No visible jaundice. No suspicious lesions of the visualized integuments.  Psych: Mood and affect are appropriate to given situation. Answers questions appropriately.       LABS AND IMAGING:  CBC RESULTS: Recent Labs   Lab Test 05/18/23  1252   WBC 2.7*   RBC 3.45*   HGB 11.7   HCT 34.6*      MCH 33.9*   MCHC 33.8   RDW 14.2        ELECTROLYTES:   Recent Labs   Lab Test 05/18/23  1252      POTASSIUM 4.0   CHLORIDE 107   NANDO 9.4   CO2 23   BUN 7.2   CR 0.76   *       IMPRESSION:   Ms. Sanderson is a 47 year old with a history of de víctor stage IV breast cancer metastatic to bone, ER/WI positive, HER2 equivocal and approximately 35% of cells by FISH negative by IHC, most recently on palbociclib and anastrozole, with plans to change to palbociclib plus exemestane.  Her past radiotherapy history is notable for palliative radiation to 30 Gy in 10 fractions to L3 to L5, completed on 1/7/21.  She was most recently treated with palliative radiotherapy for painful osseous metastasis to 20 Gy in 5 fractions to left ilium, completed on 5/5/23.  She is seen in routine follow-up approximately 6 weeks following completion of this course of radiotherapy, with stable and unchanged pain following completion of this treatment.  She has a restaging PET/CT, which we can monitor to assess whether additional radiotherapy would be dictated should progression be evident.   However, we noted that pain can be multifactorial, with cancer involvement, degenerative disease, neurological compromise, and instability all contributing to the pain experience.  If there is less FDG avidity at her treatment site, we would assume that additional factors may be contributing to her present pain experience.    PLAN:   1. Restaging PET/CT on 23, follow up with Medical Oncology afterwards to discuss results and plan of care.  2.  If there is progression of disease at the treatment site, repeat irradiation could be considered.  3.  We will not schedule a routine follow-up at this time, but instead invited Ms. Sanderson to reach out to us could we be of assistance.  We also welcomed the medical oncology team to reach out if needed in the future.    Video-Visit Details    Originating Location (pt. Location): Home    Distant Location (provider location):  On-site      We appreciate the opportunity to participate in Ms. Sanderson's care. She was encouraged to contact me with questions or concerns should they arise.    I reviewed previous medical records, which are summarized in the HPI. A total of 20 minutes were spent (including face to face time) during this visit, and more than 50% of the time was spent in counseling and coordination of care.     Sangeetha Mcwilliams MD MPH PhD    Department of Radiation Oncology        FOLLOW-UP VISIT    Patient Name: Teresa Sanderson      : 1975     Age: 47 year old        ______________________________________________________________________________     Chief Complaint   Patient presents with     Cancer     Radiation follow up     There were no vitals taken for this visit.     Date Radiation Completed: Metastatic Breast Cancer: Lumbar spine 3000 cGy completed 21    Radiation: Lt [facility] 2000 cGy finishes 23-23              Sangeetha Mcwilliams

## 2023-06-13 NOTE — LETTER
2023         RE: Teresa Sanderson  [address] [facility] [address]        Dear Colleague,    Thank you for referring your patient, Teresa Sanderson, to the [facility] RADIATION ONCOLOGY. Please see a copy of my visit note below.    Teresa is a 47 year old who is being evaluated via a billable video visit.        Radiation Oncology Follow-up Visit  2023    Teresa Sanderson  MRN: [id number]   : 1975     DISEASE TREATED:   Metastatic breast cancer, HR+/HER2-    RADIATION THERAPY DELIVERED:   1. 30 Gy in 10 fractions to L3 --L5, completed on 21  2. 20 Gy in 5 fractions to left ilium, completed on 23    INTERVAL SINCE COMPLETION OF RADIATION THERAPY:   6 weeks    ONCOLOGICAL HISTORY:   Teresa Sanderson is a 47 year old female with metastatic breast cancer that initially presented with back pain in 2020. Staging evaluation showed a left breast mass and lytic lesions of T7, L4 and pelvis. Her lumbar spine was treated with palliative radiation to a dose of 30Gy (2021). She was started on systemic therapy with Ibrance, Zoladex and Anastrozole with intermittent breaks due to social issues. She underwent bilateral salpingo-oophorectomies for ovarian suppression. More recently, restaging PET/CT showed left pelvis disease progression with associated left groin pain. She underwent palliative radiotherapy in this context for pain control. She was continued on Ibrance, Zoladex but Anastrozole was switched to Exemestane. She last seen in Medical Oncology clinic for follow up on 23, and at the time she did not find improvement/worsening in her left groin pain per chart review.     Ms. Sanderson is reached over video conference for symptom checkup at 6 weeks out post radiation.  Unfortunately, she has noted no appreciable change to pain at the left pelvis site.  Pain continues to be primarily experienced in the left groin area without radiation.  Severity is  moderate.  She spends a lot of time in bed these days, which she has done for some time.  She uses Neurontin and MS Contin for pain control, with some effect.    Physical exam: Limited by telemedicine nature of the evaluation  KPS 70  General: Alert and in no acute distress.  HEENT: Normocephalic, atraumatic. No visible scleral icterus.  Neck: Apparent full range of motion.  CV: Appears well-perfused, with no visible cyanosis.  Lungs: Breathing easily on room air, with no difficulty completing full sentences  Extremities:  No visible edema of the upper extremities. Full range of motion at the shoulders and elbows.    Neuro: Alert and oriented; grossly nonfocal. Normal speech. Moving upper extremities equally.  Skin: No visible jaundice. No suspicious lesions of the visualized integuments.  Psych: Mood and affect are appropriate to given situation. Answers questions appropriately.       LABS AND IMAGING:  CBC RESULTS: Recent Labs   Lab Test 05/18/23  1252   WBC 2.7*   RBC 3.45*   HGB 11.7   HCT 34.6*      MCH 33.9*   MCHC 33.8   RDW 14.2        ELECTROLYTES:   Recent Labs   Lab Test 05/18/23  1252      POTASSIUM 4.0   CHLORIDE 107   NANDO 9.4   CO2 23   BUN 7.2   CR 0.76   *       IMPRESSION:   Ms. Sanderson is a 47 year old with a history of de víctor stage IV breast cancer metastatic to bone, ER/OH positive, HER2 equivocal and approximately 35% of cells by FISH negative by IHC, most recently on palbociclib and anastrozole, with plans to change to palbociclib plus exemestane.  Her past radiotherapy history is notable for palliative radiation to 30 Gy in 10 fractions to L3 to L5, completed on 1/7/21.  She was most recently treated with palliative radiotherapy for painful osseous metastasis to 20 Gy in 5 fractions to left ilium, completed on 5/5/23.  She is seen in routine follow-up approximately 6 weeks following completion of this course of radiotherapy, with stable and unchanged pain following  completion of this treatment.  She has a restaging PET/CT, which we can monitor to assess whether additional radiotherapy would be dictated should progression be evident.  However, we noted that pain can be multifactorial, with cancer involvement, degenerative disease, neurological compromise, and instability all contributing to the pain experience.  If there is less FDG avidity at her treatment site, we would assume that additional factors may be contributing to her present pain experience.    PLAN:   1. Restaging PET/CT on 23, follow up with Medical Oncology afterwards to discuss results and plan of care.  2.  If there is progression of disease at the treatment site, repeat irradiation could be considered.  3.  We will not schedule a routine follow-up at this time, but instead invited Ms. Sanderson to reach out to us could we be of assistance.  We also welcomed the medical oncology team to reach out if needed in the future.    Video-Visit Details    Originating Location (pt. Location): Home    Distant Location (provider location):  On-site      We appreciate the opportunity to participate in Ms. Sanderson's care. She was encouraged to contact me with questions or concerns should they arise.    I reviewed previous medical records, which are summarized in the HPI. A total of 20 minutes were spent (including face to face time) during this visit, and more than 50% of the time was spent in counseling and coordination of care.     Sangeetha Mcwilliams MD MPH PhD    Department of Radiation Oncology        FOLLOW-UP VISIT    Patient Name: Teresa Sanderson      : 1975     Age: 47 year old        ______________________________________________________________________________     Chief Complaint   Patient presents with     Cancer     Radiation follow up     There were no vitals taken for this visit.     Date Radiation Completed: Metastatic Breast Cancer: Lumbar spine 3000 cGy completed  01/07/21    Radiation: Lt [facility] 2000 cGy finishes 5/5/23 5/1/23-5/5/23            Again, thank you for allowing me to participate in the care of your patient.        Sincerely,        Sangeetha Mcwilliams

## 2023-06-13 NOTE — PROGRESS NOTES
FOLLOW-UP VISIT    Patient Name: Teresa Sanderson      : 1975     Age: 47 year old        ______________________________________________________________________________     Chief Complaint   Patient presents with     Cancer     Radiation follow up     There were no vitals taken for this visit.     Date Radiation Completed: Metastatic Breast Cancer: Lumbar spine 3000 cGy completed 21    Radiation: Lt [facility] 2000 cGy finishes 23-23

## 2023-06-26 ENCOUNTER — TELEPHONE (OUTPATIENT)
Dept: ONCOLOGY | Facility: CLINIC | Age: 48
End: 2023-06-26
Payer: MEDICARE

## 2023-06-26 NOTE — TELEPHONE ENCOUNTER
Oral Chemotherapy Monitoring Program    Subjective/Objective:  Teresa Sanderson is a 47 year old female contacted by phone for a follow-up visit for oral chemotherapy. Teresa confirmed her dose of taking 1 tablet (125 mg) of Ibrance by mouth daily for 21 days, then off for 7 days. She reports that she has missed about 3 doses in the last 2 weeks because of her being out of town. As of today, she has 10 days before she is on her off week. She noted that she still has occasional constipation but she is able to manage it by drinking apple juice and taking Senna S. She mentions that she experiences a little nausea when taking her Ibrance, but is able to manage her symptoms with Compazine. She denies any changes in appetite and confirms she drinks about 64 ounces of water a day. She denies any redness or blistering in her mouth.        9/8/2022     2:00 PM 11/21/2022    11:00 AM 12/20/2022    11:00 AM 2/13/2023     7:00 AM 4/10/2023     2:00 PM 5/10/2023     7:00 AM 6/26/2023    11:00 AM   ORAL CHEMOTHERAPY   Assessment Type Monthly Follow up Refill Monthly Follow up Refill Lab Monitoring Refill Monthly Follow up   Diagnosis Code Breast Cancer Breast Cancer Breast Cancer Breast Cancer Breast Cancer Breast Cancer Breast Cancer   Providers Dr. Dimitrios Plunkett   [facility]   Drug Name Ibrance (palbociclib) Ibrance (palbociclib) Ibrance (palbociclib) Ibrance (palbociclib) Ibrance (palbociclib) Ibrance (palbociclib) Ibrance (palbociclib)   Dose 125 mg 125 mg 125 mg 125 mg 125 mg 125 mg 125 mg   Current Schedule Daily Daily Daily Daily Daily Daily Daily   Cycle Details 3 weeks on, 1 week off 3 weeks on, 1 week off 3 weeks on, 1 week off 3 weeks on, 1 week off 3 weeks on, 1 week off 3 weeks on, 1 week off 3 weeks on, 1 week off   Start Date of Last Cycle   12/11/2022       Planned next cycle  start date   1/8/2022       Doses missed in last 2 weeks 0  0    3   Adherence Assessment Adherent  Adherent    Non-adherent   Adverse Effects Fatigue  Nausea  Anemia  Nausea;Constipation   Nausea   Grade 1    Grade 1   Pharmacist Intervention(nausea)   Yes    No   Intervention(s)   Rx medication recommendation       Constipation       Grade 1   Pharmacist Intervention(constipation)       No   Anemia     Grade 1     Pharmacist Intervention(anemia)     No     Fatigue Grade 1         Pharmacist Intervention(fatigue) Yes         Intervention(s) Patient education         Any new drug interactions? No         Is the dose as ordered appropriate for the patient?     Yes     Since the last time we talked, have you been hospitalized or used the emergency room?     No         Last PHQ-2 Score on record:       10/4/2022    10:02 AM 5/13/2021     9:27 AM   PHQ-2 ( 1999 [employer])   Q1: Little interest or pleasure in doing things 3 1   Q2: Feeling down, depressed or hopeless 3 1   PHQ-2 Score 6 2   PHQ-2 Total Score (12-17 Years)- Positive if 3 or more points; Administer PHQ-A if positive  2       Vitals:  BP:   BP Readings from Last 1 Encounters:   05/18/23 (!) 154/92     Wt Readings from Last 1 Encounters:   05/18/23 114.5 kg (252 lb 6.4 oz)     Estimated body surface area is 2.41 meters squared as calculated from the following:    Height as of 12/16/22: 1.829 m (6').    Weight as of 5/18/23: 114.5 kg (252 lb 6.4 oz).    Labs:  No results found for NA within last 30 days.     No results found for K within last 30 days.     No results found for CA within last 30 days.     No results found for Mag within last 30 days.     No results found for Phos within last 30 days.     No results found for ALBUMIN within last 30 days.     No results found for BUN within last 30 days.     No results found for CR within last 30 days.     No results found for AST within last 30 days.     No results found for ALT within last 30 days.     No results  found for BILITOTAL within last 30 days.     No results found for WBC within last 30 days.     No results found for HGB within last 30 days.     No results found for PLT within last 30 days.     No results found for ANC within last 30 days.     No results found for ANC within last 30 days.          Assessment/Plan:  Teresa has been tolerating Ibrance but experiences occasional constipation and nausea. She is able to manage her constipation with apple juice and Senna S and her nausea with Compazine.     Follow-Up:  Review Labs 7/13      Flaco Galvez  Pharmacy Intern   Oral Chemotherapy Monitoring Program   [phone]

## 2023-07-20 ENCOUNTER — MYC MEDICAL ADVICE (OUTPATIENT)
Dept: ONCOLOGY | Facility: CLINIC | Age: 48
End: 2023-07-20

## 2023-07-20 NOTE — TELEPHONE ENCOUNTER
Oral Chemotherapy Monitoring Program  Lab Follow Up    Reviewed lab results from 7/20/23.        11/21/2022    11:00 AM 12/20/2022    11:00 AM 2/13/2023     7:00 AM 4/10/2023     2:00 PM 5/10/2023     7:00 AM 6/26/2023    11:00 AM 7/20/2023     3:00 PM   ORAL CHEMOTHERAPY   Assessment Type Refill Monthly Follow up Refill Lab Monitoring Refill Monthly Follow up Lab Monitoring   Diagnosis Code Breast Cancer Breast Cancer Breast Cancer Breast Cancer Breast Cancer Breast Cancer Breast Cancer   Providers Dr Dimitrios Plunkett   [facility]   Drug Name Ibrance (palbociclib) Ibrance (palbociclib) Ibrance (palbociclib) Ibrance (palbociclib) Ibrance (palbociclib) Ibrance (palbociclib) Ibrance (palbociclib)   Dose 125 mg 125 mg 125 mg 125 mg 125 mg 125 mg 125 mg   Current Schedule Daily Daily Daily Daily Daily Daily Daily   Cycle Details 3 weeks on, 1 week off 3 weeks on, 1 week off 3 weeks on, 1 week off 3 weeks on, 1 week off 3 weeks on, 1 week off 3 weeks on, 1 week off 3 weeks on, 1 week off   Start Date of Last Cycle  12/11/2022        Planned next cycle start date  1/8/2022        Doses missed in last 2 weeks  0    3    Adherence Assessment  Adherent    Non-adherent    Adverse Effects  Nausea  Anemia  Nausea;Constipation    Nausea  Grade 1    Grade 1    Pharmacist Intervention(nausea)  Yes    No    Intervention(s)  Rx medication recommendation        Constipation      Grade 1    Pharmacist Intervention(constipation)      No    Anemia    Grade 1      Pharmacist Intervention(anemia)    No      Is the dose as ordered appropriate for the patient?    Yes      Since the last time we talked, have you been hospitalized or used the emergency room?    No          Labs:  _  Result Component Current Result Ref Range   Sodium 146 (H) (7/20/2023) 136 - 145 mmol/L     _  Result Component Current Result Ref  Range   Potassium 3.4 (7/20/2023) 3.4 - 5.3 mmol/L     _  Result Component Current Result Ref Range   Calcium 9.6 (7/20/2023) 8.6 - 10.0 mg/dL     No results found for Mag within last 30 days.     No results found for Phos within last 30 days.     _  Result Component Current Result Ref Range   Albumin 4.1 (7/20/2023) 3.5 - 5.2 g/dL     _  Result Component Current Result Ref Range   Urea Nitrogen 15.3 (7/20/2023) 6.0 - 20.0 mg/dL     _  Result Component Current Result Ref Range   Creatinine 0.68 (7/20/2023) 0.51 - 0.95 mg/dL     _  Result Component Current Result Ref Range   AST 16 (7/20/2023) 0 - 45 U/L     _  Result Component Current Result Ref Range   ALT 11 (7/20/2023) 0 - 50 U/L     _  Result Component Current Result Ref Range   Bilirubin Total 0.2 (7/20/2023) <=1.2 mg/dL     _  Result Component Current Result Ref Range   WBC Count 2.6 (L) (7/20/2023) 4.0 - 11.0 10e3/uL     _  Result Component Current Result Ref Range   Hemoglobin 11.3 (L) (7/20/2023) 11.7 - 15.7 g/dL     _  Result Component Current Result Ref Range   Platelet Count 179 (7/20/2023) 150 - 450 10e3/uL     No results found for ANC within last 30 days.     _  Result Component Current Result Ref Range   Absolute Neutrophils 1.5 (L) (7/20/2023) 1.6 - 8.3 10e3/uL        Assessment & Plan:  Results are concerning for somewhat elevated Na and Chloride levels, which might be due to mild dehydration.  No changes with Ibrance needed at this time.  Advised patient to try to keep up with fluid intake.    iFLYER message sent to patient.    Faby Randle, PharmD, BCPS, BCOP  Oncology Clinical Pharmacy Specialist  [facility]/ [facility]  [phone]

## 2023-07-27 ENCOUNTER — HOSPITAL ENCOUNTER (OUTPATIENT)
Dept: PET IMAGING | Facility: CLINIC | Age: 48
Discharge: HOME | End: 2023-07-27
Attending: PHYSICIAN ASSISTANT | Admitting: PHYSICIAN ASSISTANT
Payer: MEDICARE

## 2023-07-27 DIAGNOSIS — C79.51 CARCINOMA OF LEFT BREAST METASTATIC TO BONE (H): ICD-10-CM

## 2023-07-27 DIAGNOSIS — C50.912 CARCINOMA OF LEFT BREAST METASTATIC TO BONE (H): ICD-10-CM

## 2023-07-27 PROCEDURE — 343N000001 HC RX 343: Performed by: PHYSICIAN ASSISTANT

## 2023-07-27 PROCEDURE — G1010 CDSM STANSON: HCPCS | Performed by: RADIOLOGY

## 2023-07-27 PROCEDURE — 250N000011 HC RX IP 250 OP 636: Performed by: PHYSICIAN ASSISTANT

## 2023-07-27 PROCEDURE — 74177 CT ABD & PELVIS W/CONTRAST: CPT

## 2023-07-27 PROCEDURE — 74177 CT ABD & PELVIS W/CONTRAST: CPT | Mod: 26 | Performed by: RADIOLOGY

## 2023-07-27 PROCEDURE — A9552 F18 FDG: HCPCS | Performed by: PHYSICIAN ASSISTANT

## 2023-07-27 PROCEDURE — 78816 PET IMAGE W/CT FULL BODY: CPT | Mod: 26 | Performed by: RADIOLOGY

## 2023-07-27 PROCEDURE — 71260 CT THORAX DX C+: CPT | Mod: 26 | Performed by: RADIOLOGY

## 2023-07-27 PROCEDURE — G1010 CDSM STANSON: HCPCS | Mod: PS

## 2023-07-27 RX ORDER — IOPAMIDOL 755 MG/ML
10-135 INJECTION, SOLUTION INTRAVASCULAR ONCE
Status: COMPLETED | OUTPATIENT
Start: 2023-07-27 | End: 2023-07-27

## 2023-07-27 RX ADMIN — FLUDEOXYGLUCOSE F-18 14.67 MILLICURIE: 500 INJECTION, SOLUTION INTRAVENOUS at 13:26

## 2023-07-27 RX ADMIN — IOPAMIDOL 123 ML: 755 INJECTION, SOLUTION INTRAVENOUS at 14:20

## 2023-07-28 DIAGNOSIS — Z17.0 MALIGNANT NEOPLASM OF OVERLAPPING SITES OF LEFT BREAST IN FEMALE, ESTROGEN RECEPTOR POSITIVE (H): ICD-10-CM

## 2023-07-28 DIAGNOSIS — C50.812 MALIGNANT NEOPLASM OF OVERLAPPING SITES OF LEFT BREAST IN FEMALE, ESTROGEN RECEPTOR POSITIVE (H): ICD-10-CM

## 2023-07-28 DIAGNOSIS — C79.51 CARCINOMA OF LEFT BREAST METASTATIC TO BONE (H): Primary | ICD-10-CM

## 2023-07-28 DIAGNOSIS — C50.912 CARCINOMA OF LEFT BREAST METASTATIC TO BONE (H): Primary | ICD-10-CM

## 2023-07-31 NOTE — PROGRESS NOTES
Oncology Visit:   Date on this visit: Aug 1, 2023    Diagnosis:  ER positive left breast cancer metastasized to bones.     Primary Physician: No Ref-Primary, Physician     History Of Present Illness:    Ms. Sanderson is a 47 year old female with a h/o tobacco abuse and DVTs with left breast cancer metastasized to bone. She presented to [facility] ED with back pain on 12/5/2020. MRI of the L-spine showed an abnormal L4 lesion with associated right paraspinal mass, abnormalities in L5 and the left iliac bone were also seen. CT C/A/P showed a left breast mass, lytic lesions of T7, L4, and the pelvis, and a 3 cm lesion in the kidney (thought to be a cyst). Ultrasound of the bilateral lower extremities showed a non-occlusive thrombus in the left popliteal vein. Mammogram and ultrasound of the bilateral breasts on 12/17/2020 showed a spiculated mass measuring at least 7.8 cm at 12-1:00 left breast extending from the nipple to 9 cm from the nipple with associated nipple retraction. This mass was biopsied, and showed IDC with surrounding DCIS, grade 3, ER+ 90%, and SD+ 75%.  HER2 was equivocal in approximately 35% of tumor cells by FISH and was negative by IHC.    Metastatic Breast Cancer Treatment:  12/23/2021 - 1/7/2021  Radiation (3000 cGy) to the lumbar spine.  1/29/2021 - present  Ibrance, zoladex, and anastrozole.  5/17/2021 radiofrequency ablation, kyphoplasty to L4  8/20/2021  Bilateral salpingo-oophorectomies, Ibrance and anastrozole.  She was off anastrozole 12/2021 - 2/2022 and off Ibrance 01/2022 - 02/2022 due to stress and impending homelessness. Off both again 03/2022-04/2022 due to death of her son but restarted in 05/2022.  04/2023  PET/CT with progression in 2 small metastases in the left pelvis.  Palbociclib continued.  Anastrozole changed to exemestane  5/5/2023  Completed radiation, 2000 cGy in 5 fractions, to the left ilium.    Interval History:   Ms. Sanderson presents to clinic for metastatic breast  cancer follow up.  She reports lower back pain that is not relieved by morphine, tylenol, or NSAIDs. She spends a lot of time in bed due to pain. She also reports feeling very worried about the results of her scan today and did not sleep well. She is still grieving the loss of her son last year and she does think she is depressed. She is interested in re-establishing with a therapist. She is enjoying spending time with her grandchildren, however, and finds them to be a source of david and motivation. She plans to celebrate her birthday this weekend in a joint celebration with a friend. She denies any other pain or side effects from her treatment. She is tolerating the palbociclib and exemestane well.      Past Medical/Surgical History:   Past Medical History:   Diagnosis Date     Anxiety      Breast CA (H) 12/2020     Depression      DVT (deep venous thrombosis) (H) 2014     Left breast mass     x approximately 4-5 months     Pulmonary emboli (H)      Pyelonephritis      Schizoaffective disorder (H)      Tobacco use      Past Surgical History:   Procedure Laterality Date     COLONOSCOPY N/A 7/8/2022    Procedure: COLONOSCOPY, WITH POLYPECTOMY;  Surgeon: Ham Cano MD;  Location: [facility]     IR LUMBAR KYPHOPLASTY VERTEBRAE  5/17/2021     LAPAROSCOPIC SALPINGO-OOPHORECTOMY Bilateral 8/20/2021    Procedure: BILATERAL SALPINGO-OOPHORECTOMY, LAPAROSCOPIC;  Surgeon: Rory Lopez MD;  Location: [facility] OR     TUBAL LIGATION  1998        Allergies   Allergen Reactions     Contrast Dye      Pt developed nausea after isovue 370 injection on 6/9/21        Current Outpatient Medications   Medication     exemestane (AROMASIN) 25 MG tablet     gabapentin (NEURONTIN) 300 MG capsule     methocarbamol (ROBAXIN) 500 MG tablet     methylPREDNISolone (MEDROL) 32 MG tablet     morphine (MS CONTIN) 15 MG CR tablet     naloxone (NARCAN) 4 MG/0.1ML nasal spray     nicotine (COMMIT) 2 MG lozenge     nicotine (NICORETTE) 2 MG gum      ondansetron (ZOFRAN) 8 MG tablet     palbociclib (IBRANCE) 125 MG tablet     palbociclib (IBRANCE) 125 MG tablet     pantoprazole (PROTONIX) 40 MG EC tablet     polyethylene glycol (MIRALAX) 17 GM/Dose powder     prochlorperazine (COMPAZINE) 10 MG tablet     QUEtiapine (SEROQUEL) 100 MG tablet     rivaroxaban ANTICOAGULANT (XARELTO) 20 MG TABS tablet     SENNA-docusate sodium (SENNA S) 8.6-50 MG tablet     sertraline (ZOLOFT) 100 MG tablet     sertraline (ZOLOFT) 50 MG tablet     tolnaftate (TINACTIN) 1 % external cream     Vitamin D3 (CHOLECALCIFEROL) 25 mcg (1000 units) tablet     No current facility-administered medications for this visit.   '  Physical Exam:   BP (!) 146/99 (BP Location: Right arm, Patient Position: Sitting, Cuff Size: Adult Regular)   Pulse 71   Temp 98  F (36.7  C) (Oral)   Wt 109.5 kg (241 lb 6.5 oz)   BMI 32.74 kg/m    General:  Well appearing adult female in NAD.  Pleasant, in no acute distress.  HEENT:  Normocephalic.  Sclera anicteric. Enlarged thyroid on exam.  MMM.  Lymph:  No palpable cervical, supraclavicular, or axillary LAD.   Chest:  CTA bilaterally.  No wheezes or crackles.  CV:  RRR. Nl S1 and S2.  No audible m/r/g.  Abd:  Soft/ND.    Ext:  No edema of the bilateral lower extremities.    Neuro:  Cranial nerves grossly intact.  Alert and oriented x 3.  Psych:  Mood appears somewhat depressed.  Skin:  No visible concerning skin rashes or lesions.    Laboratory/Imaging Studies:   I personally reviewed the below images and laboratories:    7/20/2027 Labs:  Sodium is elevated at 146 mmol/L  Chloride is elevated at 110 mmol/L  Kidney function is wnl.    LFTs are wnl.    WBC is low at 2.6; ANC is low at 1.5  Hemoglobin is low at 11.3 g/dL  Platelets are wnl.     Latest Reference Range & Units 03/16/23 16:50 04/10/23 11:50 05/15/23 12:55 07/20/23 13:31   Cancer Antigen 15-3 <=25 U/mL 51 (H) 44 (H) 48 (H) 28 (H)   (H): Data is abnormally high    7/27/2023 PET/CT:  FINDINGS:       HEAD/NECK:  There is no suspicious FDG uptake in the neck.     Mild paranasal sinus mucosal thickening. The mastoid air cells are clear.      No hypermetabolic lymph nodes are demonstrated in the neck.      The mucosal and deep spaces of the neck are unremarkable. The major salivary glands are unremarkable. Thyroid nodularity better demonstrated on 11/11/2022 ultrasound. The major vasculature of the  neck are patent.     CHEST:  Continued low level uptake in a nodular density about the left breast measuring up to 1.3 cm with SUV max 4.0, previously 5.6. Size of this lesion is stable dating back to 6/9/2022 PET/CT.     The central tracheobronchial tree is clear. No pleural effusion or pneumothorax. No acute consolidation. Stable 2 mm solid nodule in the periphery of the left upper lobe (series 8, image 27).     No hypermetabolic lymph nodes are demonstrated in the chest.     Heart size is within normal limits. No pericardial effusion. The thoracic aorta and main pulmonary artery are within normal limits for diameter. The esophagus is unremarkable.      ABDOMEN AND PELVIS:  There is no suspicious FDG uptake in the abdomen or pelvis.     The liver is unremarkable. The gallbladder is unremarkable. No intrahepatic or extrahepatic biliary ductal dilatation. The pancreas is unremarkable. No pancreatic ductal dilatation. The spleen is unremarkable. Accessory splenule. The adrenal glands are unremarkable.     Symmetric enhancement of the kidneys. No hydronephrosis. The urinary bladder is unremarkable. Postsurgical changes of bilateral salpingo-oophorectomy.     No hypermetabolic lymph nodes are demonstrated in the abdomen or pelvis.     Normal caliber of the small and large bowel. No free air, free fluid, or fluid collection.      Normal caliber of the abdominal aorta. Intravasation of kyphoplasty material into the IVC, which appears similar in extent compared to prior. There appears to be an irregular hypodensity  surrounding the proximal hyperdense material (series 4, images 197 and 198).     LOWER EXTREMITIES:   There is no suspicious FDG uptake in the visualized lower extremities.     BONES:   The previously seen FDG uptake in the left hemipelvis has essentially resolved. There is a punctate focus of uptake in the left ischial tuberosity with SUV max 3.3 (between mediastinal blood pool and liver uptake), which appears similar to prior. Multiple sclerotic metastatic  lesions appears similar to prior, notably involving the left  hemipelvis. Similar-appearing kyphoplasty changes at L4                                                                      IMPRESSION:   In this patient with a history of left breast cancer currently on  palbociclib and exemestane:  1. Findings suggestive of partial metabolic response. There is interval resolution of metabolic uptake in the previously noted sclerotic lesions of the left iliac wing. There remains low level FDG uptake in the 1.3 cm left breast nodule, which remains unchanged in size dating back to 6/9/2022 PET/CT.  2. Redemonstration of kyphoplasty intravasation changes within the IVC. Subtle hypodensities about the intraluminal kyphoplasty material is suspicious for thrombus, but appears overall decreased in conspicuity compared to prior studies.    8/1/2023 Labs:  Electrolytes are wnl.  Creatinine is elevated at 1.04 mg/dL  GFR is reduced at 66 mL/min    Alkaline phosphatase is elevated at 111  ALT, AST, and total bilirubin are wnl.    WBC is low at 3.1  Hemoglobin and platelets are wnl.    ASSESSMENT/PLAN:   47 year old female with history of DVT and ER positive left breast cancer metastasized to bones.    1.  Metastatic breast cancer: She is on treatment with palbociclib and exemestane. PET/CT in 04/2023 showed disease progression in two small bone metastasis in L pelvis. She received radiation to these lesions.  She was continued on palbociclib but anastrozole was changed to  exemestane.   - We reviewed last week's PET/CT which shows a positive response to treatment.  - Continue treatment with palbociclib and exemestane.  - Continue to monitor monthly labs and tumor markers. Of note, today's tumor markers are pending, but  in recent months has showed a decreasing trend.  - ALEKSANDRA visit in 2 months, visit with me in 4 months.  - Repeat PET/CT in 01/2024, sooner if symptomatic of disease progression or increasing trend in tumor markres.  - At time of disease progression would recommend biopsy with NGS to evaluate for ESR1 and PIK3CA mutations as well as consider next line treatment with abemaciclib and fulvestrant.    2.  Bone metastases/back pain:  She is s/p XRT to the left ilium as well as the lumbar spine and kyphoplasty of L4--there was some extravasation of cement which procedularist is aware of and recommended continued AC indefinitely. Continue MS Contin, as well as robaxin, gabapentin, NSAIDs.   - We reviewed this area on her most recent PET/CT in detail.  There is not very active disease in this area.  Suspect back pain may be residual from changes in the bone induced by prior active disease in this area.  Nevertheless, the amount of pain she has is out of proportion to what would be expected.  - Receiving Zometa once every 3 months.  She will receive a dose today.  - I recommend that she follow up with palliative care for further pain management.     3. Depression: Secondary to loss of her son last year and exacerbated by progressive disease this last April.   - She will re-establish with her therapist.    4.  Schizoaffective disorder, bipolar type: No change since last visit.  She is on treatment with Zoloft and Seroquel.  Ongoing follow up with psychiatry.     5.  DVT:  No change since last visit.  She continues on Xarelto.    6.  Enlarged thyroid: No change since last visit.  Biopsy on 1/23 c/w thyroiditis.    7.  Follow Up:  Labs every 4 weeks x 4.   ALEKSANDRA visit in 2  months.   Visit with me in 4 months.   Return visit with cancer psychology within 1 month.   Return visit with palliative medicine within 1 month.    Patient was seen and discussed with internal medicine resident, Tere Sahu MD.  The resident was personally supervised by me during the patient examination. I personally performed a physical exam and the medical decision-making. I made appropriate changes to the documentation and the assessment and plan based on my verification, exam, and medical decision making.    I personally spent 40 minutes on the date of the encounter doing chart review, review of test results, interpretation of tests, patient visit and documentation.

## 2023-08-01 ENCOUNTER — INFUSION THERAPY VISIT (OUTPATIENT)
Dept: ONCOLOGY | Facility: CLINIC | Age: 48
End: 2023-08-01
Attending: PHYSICIAN ASSISTANT
Payer: MEDICARE

## 2023-08-01 ENCOUNTER — APPOINTMENT (OUTPATIENT)
Dept: LAB | Facility: CLINIC | Age: 48
End: 2023-08-01
Attending: PHYSICIAN ASSISTANT
Payer: MEDICARE

## 2023-08-01 VITALS
TEMPERATURE: 98 F | BODY MASS INDEX: 32.75 KG/M2 | HEART RATE: 71 BPM | SYSTOLIC BLOOD PRESSURE: 146 MMHG | RESPIRATION RATE: 18 BRPM | DIASTOLIC BLOOD PRESSURE: 99 MMHG | WEIGHT: 241.5 LBS

## 2023-08-01 VITALS
WEIGHT: 241.4 LBS | BODY MASS INDEX: 32.74 KG/M2 | SYSTOLIC BLOOD PRESSURE: 146 MMHG | TEMPERATURE: 98 F | HEART RATE: 71 BPM | DIASTOLIC BLOOD PRESSURE: 99 MMHG

## 2023-08-01 DIAGNOSIS — C50.912 CARCINOMA OF LEFT BREAST METASTATIC TO BONE (H): Primary | ICD-10-CM

## 2023-08-01 DIAGNOSIS — C79.51 CARCINOMA OF LEFT BREAST METASTATIC TO BONE (H): Primary | ICD-10-CM

## 2023-08-01 DIAGNOSIS — C50.812 MALIGNANT NEOPLASM OF OVERLAPPING SITES OF LEFT BREAST IN FEMALE, ESTROGEN RECEPTOR POSITIVE (H): Primary | ICD-10-CM

## 2023-08-01 DIAGNOSIS — C50.912 CARCINOMA OF LEFT BREAST METASTATIC TO BONE (H): ICD-10-CM

## 2023-08-01 DIAGNOSIS — C79.51 CARCINOMA OF LEFT BREAST METASTATIC TO BONE (H): ICD-10-CM

## 2023-08-01 DIAGNOSIS — Z51.11 ENCOUNTER FOR ANTINEOPLASTIC CHEMOTHERAPY: ICD-10-CM

## 2023-08-01 DIAGNOSIS — Z17.0 MALIGNANT NEOPLASM OF OVERLAPPING SITES OF LEFT BREAST IN FEMALE, ESTROGEN RECEPTOR POSITIVE (H): Primary | ICD-10-CM

## 2023-08-01 DIAGNOSIS — F33.1 MODERATE EPISODE OF RECURRENT MAJOR DEPRESSIVE DISORDER (H): ICD-10-CM

## 2023-08-01 DIAGNOSIS — G89.3 CANCER RELATED PAIN: ICD-10-CM

## 2023-08-01 LAB
ALBUMIN SERPL BCG-MCNC: 4.3 G/DL (ref 3.5–5.2)
ALP SERPL-CCNC: 111 U/L (ref 35–104)
ALT SERPL W P-5'-P-CCNC: 19 U/L (ref 0–50)
ANION GAP SERPL CALCULATED.3IONS-SCNC: 10 MMOL/L (ref 7–15)
AST SERPL W P-5'-P-CCNC: 17 U/L (ref 0–45)
BASOPHILS # BLD MANUAL: 0.1 10E3/UL (ref 0–0.2)
BASOPHILS NFR BLD MANUAL: 4 %
BILIRUB SERPL-MCNC: 0.3 MG/DL
BUN SERPL-MCNC: 18.5 MG/DL (ref 6–20)
CALCIUM SERPL-MCNC: 9.5 MG/DL (ref 8.6–10)
CANCER AG15-3 SERPL-ACNC: 36 U/ML
CEA SERPL-MCNC: 2.6 NG/ML
CHLORIDE SERPL-SCNC: 107 MMOL/L (ref 98–107)
CREAT SERPL-MCNC: 1.04 MG/DL (ref 0.51–0.95)
DEPRECATED HCO3 PLAS-SCNC: 25 MMOL/L (ref 22–29)
EOSINOPHIL # BLD MANUAL: 0 10E3/UL (ref 0–0.7)
EOSINOPHIL NFR BLD MANUAL: 0 %
ERYTHROCYTE [DISTWIDTH] IN BLOOD BY AUTOMATED COUNT: 13.8 % (ref 10–15)
GFR SERPL CREATININE-BSD FRML MDRD: 66 ML/MIN/1.73M2
GLUCOSE SERPL-MCNC: 129 MG/DL (ref 70–99)
HCT VFR BLD AUTO: 35.8 % (ref 35–47)
HGB BLD-MCNC: 12.1 G/DL (ref 11.7–15.7)
LYMPHOCYTES # BLD MANUAL: 1.2 10E3/UL (ref 0.8–5.3)
LYMPHOCYTES NFR BLD MANUAL: 38 %
MCH RBC QN AUTO: 33.5 PG (ref 26.5–33)
MCHC RBC AUTO-ENTMCNC: 33.8 G/DL (ref 31.5–36.5)
MCV RBC AUTO: 99 FL (ref 78–100)
MONOCYTES # BLD MANUAL: 0 10E3/UL (ref 0–1.3)
MONOCYTES NFR BLD MANUAL: 1 %
NEUTROPHILS # BLD MANUAL: 1.8 10E3/UL (ref 1.6–8.3)
NEUTROPHILS NFR BLD MANUAL: 57 %
PLAT MORPH BLD: NORMAL
PLATELET # BLD AUTO: 214 10E3/UL (ref 150–450)
POTASSIUM SERPL-SCNC: 3.7 MMOL/L (ref 3.4–5.3)
PROT SERPL-MCNC: 7.4 G/DL (ref 6.4–8.3)
RBC # BLD AUTO: 3.61 10E6/UL (ref 3.8–5.2)
RBC MORPH BLD: NORMAL
SODIUM SERPL-SCNC: 142 MMOL/L (ref 136–145)
WBC # BLD AUTO: 3.1 10E3/UL (ref 4–11)

## 2023-08-01 PROCEDURE — 36415 COLL VENOUS BLD VENIPUNCTURE: CPT

## 2023-08-01 PROCEDURE — 96365 THER/PROPH/DIAG IV INF INIT: CPT

## 2023-08-01 PROCEDURE — 99215 OFFICE O/P EST HI 40 MIN: CPT | Performed by: INTERNAL MEDICINE

## 2023-08-01 PROCEDURE — 258N000003 HC RX IP 258 OP 636: Performed by: INTERNAL MEDICINE

## 2023-08-01 PROCEDURE — G0463 HOSPITAL OUTPT CLINIC VISIT: HCPCS | Mod: 25 | Performed by: INTERNAL MEDICINE

## 2023-08-01 PROCEDURE — 82378 CARCINOEMBRYONIC ANTIGEN: CPT

## 2023-08-01 PROCEDURE — 85014 HEMATOCRIT: CPT

## 2023-08-01 PROCEDURE — 85007 BL SMEAR W/DIFF WBC COUNT: CPT

## 2023-08-01 PROCEDURE — 250N000011 HC RX IP 250 OP 636: Mod: JZ | Performed by: INTERNAL MEDICINE

## 2023-08-01 PROCEDURE — 80053 COMPREHEN METABOLIC PANEL: CPT

## 2023-08-01 PROCEDURE — 86300 IMMUNOASSAY TUMOR CA 15-3: CPT

## 2023-08-01 RX ORDER — ZOLEDRONIC ACID 0.04 MG/ML
4 INJECTION, SOLUTION INTRAVENOUS ONCE
Status: COMPLETED | OUTPATIENT
Start: 2023-08-01 | End: 2023-08-01

## 2023-08-01 RX ADMIN — ZOLEDRONIC ACID 4 MG: 0.04 INJECTION, SOLUTION INTRAVENOUS at 09:55

## 2023-08-01 RX ADMIN — SODIUM CHLORIDE 250 ML: 9 INJECTION, SOLUTION INTRAVENOUS at 09:50

## 2023-08-01 ASSESSMENT — PAIN SCALES - GENERAL
PAINLEVEL: MODERATE PAIN (5)
PAINLEVEL: MODERATE PAIN (5)

## 2023-08-01 NOTE — LETTER
8/1/2023         RE: Teresa Sanderson  [address] [id number]        Dear Colleague,    Thank you for referring your patient, Teresa Sanderson, to the [facility]. Please see a copy of my visit note below.    Oncology Visit:   Date on this visit: Aug 1, 2023    Diagnosis:  ER positive left breast cancer metastasized to bones.     Primary Physician: No Ref-Primary, Physician     History Of Present Illness:    Ms. Sanderson is a 47 year old female with a h/o tobacco abuse and DVTs with left breast cancer metastasized to bone. She presented to [place] ED with back pain on 12/5/2020. MRI of the L-spine showed an abnormal L4 lesion with associated right paraspinal mass, abnormalities in L5 and the left iliac bone were also seen. CT C/A/P showed a left breast mass, lytic lesions of T7, L4, and the pelvis, and a 3 cm lesion in the kidney (thought to be a cyst). Ultrasound of the bilateral lower extremities showed a non-occlusive thrombus in the left popliteal vein. Mammogram and ultrasound of the bilateral breasts on 12/17/2020 showed a spiculated mass measuring at least 7.8 cm at 12-1:00 left breast extending from the nipple to 9 cm from the nipple with associated nipple retraction. This mass was biopsied, and showed IDC with surrounding DCIS, grade 3, ER+ 90%, and NC+ 75%.  HER2 was equivocal in approximately 35% of tumor cells by FISH and was negative by IHC.    Metastatic Breast Cancer Treatment:  12/23/2021 - 1/7/2021  Radiation (3000 cGy) to the lumbar spine.  1/29/2021 - present  Ibrance, zoladex, and anastrozole.  5/17/2021 radiofrequency ablation, kyphoplasty to L4  8/20/2021  Bilateral salpingo-oophorectomies, Ibrance and anastrozole.  She was off anastrozole 12/2021 - 2/2022 and off Ibrance 01/2022 - 02/2022 due to stress and impending homelessness. Off both again 03/2022-04/2022 due to death of her son but restarted in 05/2022.  04/2023  PET/CT with  progression in 2 small metastases in the left pelvis.  Palbociclib continued.  Anastrozole changed to exemestane  5/5/2023  Completed radiation, 2000 cGy in 5 fractions, to the left ilium.    Interval History:   Ms. Sanderson presents to clinic for metastatic breast cancer follow up.  She reports lower back pain that is not relieved by morphine, tylenol, or NSAIDs. She spends a lot of time in bed due to pain. She also reports feeling very worried about the results of her scan today and did not sleep well. She is still grieving the loss of her son last year and she does think she is depressed. She is interested in re-establishing with a therapist. She is enjoying spending time with her grandchildren, however, and finds them to be a source of david and motivation. She plans to celebrate her birthday this weekend in a joint celebration with a friend. She denies any other pain or side effects from her treatment. She is tolerating the palbociclib and exemestane well.      Past Medical/Surgical History:   Past Medical History:   Diagnosis Date    Anxiety     Breast CA (H) 12/2020    Depression     DVT (deep venous thrombosis) (H) 2014    Left breast mass     x approximately 4-5 months    Pulmonary emboli (H)     Pyelonephritis     Schizoaffective disorder (H)     Tobacco use      Past Surgical History:   Procedure Laterality Date    COLONOSCOPY N/A 7/8/2022    Procedure: COLONOSCOPY, WITH POLYPECTOMY;  Surgeon: Ham Cano MD;  [place]: [facility] [place]    IR LUMBAR KYPHOPLASTY VERTEBRAE  5/17/2021    LAPAROSCOPIC SALPINGO-OOPHORECTOMY Bilateral 8/20/2021    Procedure: BILATERAL SALPINGO-OOPHORECTOMY, LAPAROSCOPIC;  Surgeon: Rory Lopez MD;  Location: [facility] [place]    TUBAL LIGATION  1998        Allergies   Allergen Reactions    Contrast Dye      Pt developed nausea after isovue 370 injection on 6/9/21        Current Outpatient Medications   Medication    exemestane (AROMASIN) 25 MG tablet    gabapentin (NEURONTIN) 300 MG  capsule    methocarbamol (ROBAXIN) 500 MG tablet    methylPREDNISolone (MEDROL) 32 MG tablet    morphine (MS CONTIN) 15 MG CR tablet    naloxone (NARCAN) 4 MG/0.1ML nasal spray    nicotine (COMMIT) 2 MG lozenge    nicotine (NICORETTE) 2 MG gum    ondansetron (ZOFRAN) 8 MG tablet    palbociclib (IBRANCE) 125 MG tablet    palbociclib (IBRANCE) 125 MG tablet    pantoprazole (PROTONIX) 40 MG EC tablet    polyethylene glycol (MIRALAX) 17 GM/Dose powder    prochlorperazine (COMPAZINE) 10 MG tablet    QUEtiapine (SEROQUEL) 100 MG tablet    rivaroxaban ANTICOAGULANT (XARELTO) 20 MG TABS tablet    SENNA-docusate sodium (SENNA S) 8.6-50 MG tablet    sertraline (ZOLOFT) 100 MG tablet    sertraline (ZOLOFT) 50 MG tablet    tolnaftate (TINACTIN) 1 % external cream    Vitamin D3 (CHOLECALCIFEROL) 25 mcg (1000 units) tablet     No current facility-administered medications for this visit.   '  Physical Exam:   BP (!) 146/99 (BP Location: Right arm, Patient Position: Sitting, Cuff Size: Adult Regular)   Pulse 71   Temp 98  F (36.7  C) (Oral)   Wt 109.5 kg (241 lb 6.5 oz)   BMI 32.74 kg/m    General:  Well appearing adult female in NAD.  Pleasant, in no acute distress.  HEENT:  Normocephalic.  Sclera anicteric. Enlarged thyroid on exam.  MMM.  Lymph:  No palpable cervical, supraclavicular, or axillary LAD.   Chest:  CTA bilaterally.  No wheezes or crackles.  CV:  RRR. Nl S1 and S2.  No audible m/r/g.  Abd:  Soft/ND.    Ext:  No edema of the bilateral lower extremities.    Neuro:  Cranial nerves grossly intact.  Alert and oriented x 3.  Psych:  Mood appears somewhat depressed.  Skin:  No visible concerning skin rashes or lesions.    Laboratory/Imaging Studies:   I personally reviewed the below images and laboratories:    7/20/2027 Labs:  Sodium is elevated at 146 mmol/L  Chloride is elevated at 110 mmol/L  Kidney function is wnl.    LFTs are wnl.    WBC is low at 2.6; ANC is low at 1.5  Hemoglobin is low at 11.3 g/dL  Platelets  are wnl.     Latest Reference Range & Units 03/16/23 16:50 04/10/23 11:50 05/15/23 12:55 07/20/23 13:31   Cancer Antigen 15-3 <=25 U/mL 51 (H) 44 (H) 48 (H) 28 (H)   (H): Data is abnormally high    7/27/2023 PET/CT:  FINDINGS:      HEAD/NECK:  There is no suspicious FDG uptake in the neck.     Mild paranasal sinus mucosal thickening. The mastoid air cells are clear.      No hypermetabolic lymph nodes are demonstrated in the neck.      The mucosal and deep spaces of the neck are unremarkable. The major salivary glands are unremarkable. Thyroid nodularity better demonstrated on 11/11/2022 ultrasound. The major vasculature of the  neck are patent.     CHEST:  Continued low level uptake in a nodular density about the left breast measuring up to 1.3 cm with SUV max 4.0, previously 5.6. Size of this lesion is stable dating back to 6/9/2022 PET/CT.     The central tracheobronchial tree is clear. No pleural effusion or pneumothorax. No acute consolidation. Stable 2 mm solid nodule in the periphery of the left upper lobe (series 8, image 27).     No hypermetabolic lymph nodes are demonstrated in the chest.     Heart size is within normal limits. No pericardial effusion. The thoracic aorta and main pulmonary artery are within normal limits for diameter. The esophagus is unremarkable.      ABDOMEN AND PELVIS:  There is no suspicious FDG uptake in the abdomen or pelvis.     The liver is unremarkable. The gallbladder is unremarkable. No intrahepatic or extrahepatic biliary ductal dilatation. The pancreas is unremarkable. No pancreatic ductal dilatation. The spleen is unremarkable. Accessory splenule. The adrenal glands are unremarkable.     Symmetric enhancement of the kidneys. No hydronephrosis. The urinary bladder is unremarkable. Postsurgical changes of bilateral salpingo-oophorectomy.     No hypermetabolic lymph nodes are demonstrated in the abdomen or pelvis.     Normal caliber of the small and large bowel. No free air,  free fluid, or fluid collection.      Normal caliber of the abdominal aorta. Intravasation of kyphoplasty material into the IVC, which appears similar in extent compared to prior. There appears to be an irregular hypodensity surrounding the proximal hyperdense material (series 4, images 197 and 198).     LOWER EXTREMITIES:   There is no suspicious FDG uptake in the visualized lower extremities.     BONES:   The previously seen FDG uptake in the left hemipelvis has essentially resolved. There is a punctate focus of uptake in the left ischial tuberosity with SUV max 3.3 (between mediastinal blood pool and liver uptake), which appears similar to prior. Multiple sclerotic metastatic  lesions appears similar to prior, notably involving the left  hemipelvis. Similar-appearing kyphoplasty changes at L4                                                                      IMPRESSION:   In this patient with a history of left breast cancer currently on  palbociclib and exemestane:  1. Findings suggestive of partial metabolic response. There is interval resolution of metabolic uptake in the previously noted sclerotic lesions of the left iliac wing. There remains low level FDG uptake in the 1.3 cm left breast nodule, which remains unchanged in size dating back to 6/9/2022 PET/CT.  2. Redemonstration of kyphoplasty intravasation changes within the IVC. Subtle hypodensities about the intraluminal kyphoplasty material is suspicious for thrombus, but appears overall decreased in conspicuity compared to prior studies.    8/1/2023 Labs:  Electrolytes are wnl.  Creatinine is elevated at 1.04 mg/dL  GFR is reduced at 66 mL/min    Alkaline phosphatase is elevated at 111  ALT, AST, and total bilirubin are wnl.    WBC is low at 3.1  Hemoglobin and platelets are wnl.    ASSESSMENT/PLAN:   47 year old female with history of DVT and ER positive left breast cancer metastasized to bones.    1.  Metastatic breast cancer: She is on treatment with  palbociclib and exemestane. PET/CT in 04/2023 showed disease progression in two small bone metastasis in L pelvis. She received radiation to these lesions.  She was continued on palbociclib but anastrozole was changed to exemestane.   - We reviewed last week's PET/CT which shows a positive response to treatment.  - Continue treatment with palbociclib and exemestane.  - Continue to monitor monthly labs and tumor markers. Of note, today's tumor markers are pending, but  in recent months has showed a decreasing trend.  - ALEKSANDRA visit in 2 months, visit with me in 4 months.  - Repeat PET/CT in 01/2024, sooner if symptomatic of disease progression or increasing trend in tumor markres.  - At time of disease progression would recommend biopsy with NGS to evaluate for ESR1 and PIK3CA mutations as well as consider next line treatment with abemaciclib and fulvestrant.    2.  Bone metastases/back pain:  She is s/p XRT to the left ilium as well as the lumbar spine and kyphoplasty of L4--there was some extravasation of cement which procedularist is aware of and recommended continued AC indefinitely. Continue MS Contin, as well as robaxin, gabapentin, NSAIDs.   - We reviewed this area on her most recent PET/CT in detail.  There is not very active disease in this area.  Suspect back pain may be residual from changes in the bone induced by prior active disease in this area.  Nevertheless, the amount of pain she has is out of proportion to what would be expected.  - Receiving Zometa once every 3 months.  She will receive a dose today.  - I recommend that she follow up with palliative care for further pain management.     3. Depression: Secondary to loss of her son last year and exacerbated by progressive disease this last April.   - She will re-establish with her therapist.    4.  Schizoaffective disorder, bipolar type: No change since last visit.  She is on treatment with Zoloft and Seroquel.  Ongoing follow up with psychiatry.      5.  DVT:  No change since last visit.  She continues on Xarelto.    6.  Enlarged thyroid: No change since last visit.  Biopsy on 1/23 c/w thyroiditis.    7.  Follow Up:  Labs every 4 weeks x 4.   ALEKSANDRA visit in 2 months.   Visit with me in 4 months.   Return visit with cancer psychology within 1 month.   Return visit with palliative medicine within 1 month.    Patient was seen and discussed with internal medicine resident, Tere Sahu MD.  The resident was personally supervised by me during the patient examination. I personally performed a physical exam and the medical decision-making. I made appropriate changes to the documentation and the assessment and plan based on my verification, exam, and medical decision making.    I personally spent 40 minutes on the date of the encounter doing chart review, review of test results, interpretation of tests, patient visit and documentation.         Jahaira Plunkett MD

## 2023-08-01 NOTE — NURSING NOTE
Oncology Rooming Note    August 1, 2023 8:57 AM   Teresa Sanderson is a 47 year old female who presents for:    Chief Complaint   Patient presents with    Oncology Clinic Visit     Carcinoma of left breast metastatic to bone (H) (Primary Dx)     Initial Vitals: BP (!) 146/99 (BP Location: Right arm, Patient Position: Sitting, Cuff Size: Adult Regular)   Pulse 71   Temp 98  F (36.7  C) (Oral)   Wt 109.5 kg (241 lb 6.5 oz)   BMI 32.74 kg/m   Estimated body mass index is 32.74 kg/m  as calculated from the following:    Height as of 12/16/22: 1.829 m (6').    Weight as of this encounter: 109.5 kg (241 lb 6.5 oz). Body surface area is 2.36 meters squared.  Moderate Pain (5) Comment: Data Unavailable   No LMP recorded. (Menstrual status: Tubal ).  Allergies reviewed: Yes  Medications reviewed: Yes    Medications: MEDICATION REFILLS NEEDED TODAY. Provider was notified.  Pharmacy name entered into [facility]:    [id number] - [place], [place] - [address] AT [facility] [address] [facility] [address]  [facility] [place], [place] - [address]  [id number] - [place] [facility] [address] [id number] - [place], [place] - [address] AT [address] & [facility] - [place], [place] - [address]     Clinical concerns:    Pt needs refills for  Aromasin  Morphine   Ibrance   Seroquel   Xarelto       Pain in neck that comes and goes   Today pain is 5/10      Kamila Spence

## 2023-08-01 NOTE — PROGRESS NOTES
Infusion Nursing Note:  Teresa Sanderson presents today for Zometa (q 3 months).    Patient seen by provider today: Yes: Dr. Plunkett   present during visit today: Not Applicable.    Note: Patient arrives feeling well and received good news from provider today. She is currently not taking Calcium/Vit D but will  OTC meds and resume.      Intravenous Access:  Peripheral IV placed.    Treatment Conditions:  Lab Results   Component Value Date    HGB 12.1 08/01/2023    WBC 3.1 (L) 08/01/2023    ANEU 1.8 08/01/2023    ANEUTAUTO 1.5 (L) 07/20/2023     08/01/2023        Lab Results   Component Value Date     08/01/2023    POTASSIUM 3.7 08/01/2023    CR 1.04 (H) 08/01/2023    NANDO 9.5 08/01/2023    BILITOTAL 0.3 08/01/2023    ALBUMIN 4.3 08/01/2023    ALT 19 08/01/2023    AST 17 08/01/2023       Results reviewed, labs MET treatment parameters, ok to proceed with treatment.      Post Infusion Assessment:  Patient tolerated infusion without incident.  Blood return noted pre and post infusion.  Site patent and intact, free from redness, edema or discomfort.  Access discontinued per protocol.       Discharge Plan:   Patient declined prescription refills.  Discharge instructions reviewed with: Patient.  Patient and/or family verbalized understanding of discharge instructions and all questions answered.  AVS to patient via [employer].  Patient will return ~10/1 for ALEKSANDRA visit. Pt will look on [facility] for updates.  Patient discharged in stable condition accompanied by: self.  Departure Mode: Ambulatory.      Nadya Cho RN

## 2023-08-01 NOTE — NURSING NOTE
Chief Complaint   Patient presents with    Blood Draw     Labs drawn from PIV placed by RN. Line flushed with saline. Vitals taken. Pt checked in for appointment(s).      Lorena Holt RN

## 2023-08-04 NOTE — PROGRESS NOTES
RADIATION ONCOLOGY FOLLOWUP EVALUATION    Patient Name: Teresa Sanderson  MRN: [id number]  : 1975    Date: 2023    Diagnosis: Metastatic breast cancer, HR+/HER2-    RADIATION THERAPY DELIVERED:   1. 30 Gy in 10 fractions to L3 --L5, completed on 21  2. 20 Gy in 5 fractions to left ilium, completed on 23     INTERVAL SINCE COMPLETION OF RADIATION THERAPY:   14 weeks    Virtual Visit Details    Type of service:  Video Visit   Video Start Time:  2:45  Video End Time: 3:00    Originating Location (pt. Location): Home    Distant Location (provider location):  On-site  Platform used for Video Visit: Telephone     History:   Teresa Sanderson is a 47 year old female with metastatic breast cancer that initially presented with back pain in 2020. Staging evaluation showed a left breast mass and lytic lesions of T7, L4 and pelvis. Her lumbar spine was treated with palliative radiation to a dose of 30Gy (2021). She was started on systemic therapy with Ibrance, Zoladex and Anastrozole with intermittent breaks due to social issues. She underwent bilateral salpingo-oophorectomies for ovarian suppression. More recently, restaging PET/CT showed left pelvis disease progression with associated left groin pain. She underwent palliative radiotherapy in this context for pain control. She was continued on Ibrance, Zoladex but Anastrozole was switched to Exemestane.     During her 6-week follow-up after radiotherapy, Teresa reported no significant improvement in pain at the left pelvis site. The pain continued to be primarily felt in the left groin area. She spent a significant amount of time in bed. To manage pain, she used Neurontin and MS Contin.    On , a follow-up PET CT scan showed findings suggestive of a partial metabolic response. There was an interval resolution of metabolic uptake in the previously noted sclerotic lesions of the left iliac wing. During the follow-up appointment with the medical  oncologist on August 1, 2023, Teresa continued to report lower back pain that was not relieved by morphine, Tylenol, or NSAIDs. The pain resulted in her spending a significant amount of time in bed. She expressed feeling anxious about the scan results and having difficulty sleeping. Additionally, she was still grieving the loss of her son the previous year and felt she might be experiencing depression. Patient was told that PET/CT results from the previous week showed a positive response to treatment. The medical oncologist recommended continuing treatment with palbociclib and exemestane and following up with palliative care for further pain management.    Medical oncologist: Dr. Plunkett  Surgical oncologist: None    Interval History:  In the interim, the patient reports experiencing persistent and substantial lower back pain. This pain is restricting her participation in her birthday plans, which unfortunately were scheduled for today. She pinpoints the location of the pain to her mid-lower back, precisely around the area of her previous kyphoplasty. While she did consult with palliative care in February 2023, she is still awaiting the scheduling of her next palliative care appointment. As for her left pelvis, which was previously irradiated, she hasn't noticed any significant change in her pain level in that region.    Review of Systems: A complete 10 system review was negative except as noted in the HPI above.    Physical Examination:  Video call    Data Review:  Imaging:  PET CT on 7/27/2023 compared to prior 4/15/2023      Decreased L pelvis uptake (left image is from April 2023 and right is from July 2023)    Impression and plan:   Ms. Sanderson is a 47 year old with a history of de víctor stage IV breast cancer metastatic to bone, ER/MI positive, HER2 equivocal and approximately 35% of cells by FISH negative by IHC, most recently on palbociclib and anastrozole, with plans to change to palbociclib plus  exemestane.  Her past radiotherapy history is notable for palliative radiation to 30 Gy in 10 fractions to L3 to L5, completed on 1/7/21.  She was most recently treated with palliative radiotherapy for painful osseous metastasis to 20 Gy in 5 fractions to left ilium, completed on 5/5/23.     The patient demonstrated a response as evidenced by the PET/CT conducted on 7/27/2023, showing improvement in her sclerotic lesions on the left iliac wing following radiation. However, despite this response, the patient's lower back pain did not experience significant alleviation. This suggests that her pain likely originates not from her metastatic bone lesions but rather from her prior L4 kyphoplasty site.    The next step for this patient involves arranging a follow-up appointment with the palliative care team, considering that her pain remains uncontrolled. We will initiate contact with palliative care to facilitate the scheduling of this appointment at the earliest convenience. Subsequent follow-up with the patient will be conducted as necessary. All of her inquiries were addressed to her satisfaction verbally. We provided the patient with instructions to reach out if any questions or concerns arise during the interim period.    Follow-up with other providers is as follows:  Medical oncology: Dr. Jahaira Plunkett  Palliative oncology: Dr. Bhupinder Frank    I personally reviewed the available images. Laboratory data and pathology as noted above was reviewed. I reviewed previous medical records, which are summarized in the HPI. I spent a total of 20 minutes of time including face to face time and indirect time during this visit, and more than 50% of the time was spent in counseling and coordination of care.    Sangeetha Mcwilliams MD MPH PhD

## 2023-08-08 ENCOUNTER — VIRTUAL VISIT (OUTPATIENT)
Dept: RADIATION ONCOLOGY | Facility: CLINIC | Age: 48
End: 2023-08-08
Attending: RADIOLOGY
Payer: MEDICARE

## 2023-08-08 DIAGNOSIS — C79.51 CARCINOMA OF BREAST METASTATIC TO BONE, UNSPECIFIED LATERALITY (H): Primary | ICD-10-CM

## 2023-08-08 DIAGNOSIS — C50.919 CARCINOMA OF BREAST METASTATIC TO BONE, UNSPECIFIED LATERALITY (H): Primary | ICD-10-CM

## 2023-08-08 PROCEDURE — 99213 OFFICE O/P EST LOW 20 MIN: CPT | Mod: 95 | Performed by: RADIOLOGY

## 2023-08-08 NOTE — PROGRESS NOTES
"Virtual Visit Details    Originating Location (pt. Location): Home    Distant Location (provider location):  On-site      FOLLOW-UP VISIT    Patient Name: Teresa Sanderson      : 1975     Age: 48 year old        ______________________________________________________________________________     Chief Complaint   Patient presents with    Breast Cancer     Follow up for radiation     There were no vitals taken for this visit.     Date Radiation Completed: Radiation: Lt illiac 2000 cGy finishes 23      Pain  Current history of pain associated with this visit:   Intensity: 8/10 down to 5/10 when medication on board  Current: throbbing  Location: Low back  Treatment: Morphine and OTC medication    Labs  Other Labs: No    Imaging  None    Other Appointments: No    Residual Radiation side effect: Pt feels the radiation has help \"a little \" with pain control.    Additional Instructions:     Nurse face-to-face time: Level 3:  10 min phone time          "

## 2023-08-08 NOTE — LETTER
2023         RE: Teresa Sanderson  [address] [facility] [address]      RADIATION ONCOLOGY FOLLOWUP EVALUATION    Patient Name: Teresa Sanderson  MRN: [id number]  : 1975    Date: 2023    Diagnosis: Metastatic breast cancer, HR+/HER2-    RADIATION THERAPY DELIVERED:   1. 30 Gy in 10 fractions to L3 --L5, completed on 21  2. 20 Gy in 5 fractions to left ilium, completed on 23     INTERVAL SINCE COMPLETION OF RADIATION THERAPY:   14 weeks    Virtual Visit Details    Type of service:  Video Visit   Video Start Time:  2:45  Video End Time: 3:00    Originating Location (pt. Location): Home  {PROVIDER LOCATION On-site should be selected for visits conducted from your clinic location or adjoining [facility] hospital, academic office, or other nearby [facility] building. Off-site should be selected for all other provider locations, including home:[id number]}  Distant Location (provider location):  On-site  Platform used for Video Visit: Telephone     History:   Teresa Sanderson is a 47 year old female with metastatic breast cancer that initially presented with back pain in 2020. Staging evaluation showed a left breast mass and lytic lesions of T7, L4 and pelvis. Her lumbar spine was treated with palliative radiation to a dose of 30Gy (2021). She was started on systemic therapy with Ibrance, Zoladex and Anastrozole with intermittent breaks due to social issues. She underwent bilateral salpingo-oophorectomies for ovarian suppression. More recently, restaging PET/CT showed left pelvis disease progression with associated left groin pain. She underwent palliative radiotherapy in this context for pain control. She was continued on Ibrance, Zoladex but Anastrozole was switched to Exemestane.     During her 6-week follow-up after radiotherapy, Teresa reported no significant improvement in pain at the left pelvis site. The pain continued to be primarily felt in the left groin area. She spent a  significant amount of time in bed. To manage pain, she used Neurontin and MS Contin.    On July 27, a follow-up PET CT scan showed findings suggestive of a partial metabolic response. There was an interval resolution of metabolic uptake in the previously noted sclerotic lesions of the left iliac wing. During the follow-up appointment with the medical oncologist on August 1, 2023, Teresa continued to report lower back pain that was not relieved by morphine, Tylenol, or NSAIDs. The pain resulted in her spending a significant amount of time in bed. She expressed feeling anxious about the scan results and having difficulty sleeping. Additionally, she was still grieving the loss of her son the previous year and felt she might be experiencing depression. Patient was told that PET/CT results from the previous week showed a positive response to treatment. The medical oncologist recommended continuing treatment with palbociclib and exemestane and following up with palliative care for further pain management.    Medical oncologist: Dr. Plunkett  Surgical oncologist: None    Interval History:  In the interim, the patient reports experiencing persistent and substantial lower back pain. This pain is restricting her participation in her birthday plans, which unfortunately were scheduled for today. She pinpoints the location of the pain to her mid-lower back, precisely around the area of her previous kyphoplasty. While she did consult with palliative care in February 2023, she is still awaiting the scheduling of her next palliative care appointment. As for her left pelvis, which was previously irradiated, she hasn't noticed any significant change in her pain level in that region.    Review of Systems: A complete 10 system review was negative except as noted in the HPI above.    Physical Examination:  Video call    Data Review:  Imaging:  PET CT on 7/27/2023 compared to prior 4/15/2023      Decreased L pelvis uptake (left image  is from April 2023 and right is from July 2023)    Impression and plan:   Ms. Sanderson is a 47 year old with a history of de víctor stage IV breast cancer metastatic to bone, ER/CT positive, HER2 equivocal and approximately 35% of cells by FISH negative by IHC, most recently on palbociclib and anastrozole, with plans to change to palbociclib plus exemestane.  Her past radiotherapy history is notable for palliative radiation to 30 Gy in 10 fractions to L3 to L5, completed on 1/7/21.  She was most recently treated with palliative radiotherapy for painful osseous metastasis to 20 Gy in 5 fractions to left ilium, completed on 5/5/23.     The patient demonstrated a response as evidenced by the PET/CT conducted on 7/27/2023, showing improvement in her sclerotic lesions on the left iliac wing following radiation. However, despite this response, the patient's lower back pain did not experience significant alleviation. This suggests that her pain likely originates not from her metastatic bone lesions but rather from her prior L4 kyphoplasty site.    The next step for this patient involves arranging a follow-up appointment with the palliative care team, considering that her pain remains uncontrolled. We will initiate contact with palliative care to facilitate the scheduling of this appointment at the earliest convenience. Subsequent follow-up with the patient will be conducted as necessary. All of her inquiries were addressed to her satisfaction verbally. We provided the patient with instructions to reach out if any questions or concerns arise during the interim period.    Follow-up with other providers is as follows:  Medical oncology: Dr. Jahaira Plunkett  Palliative oncology: Dr. Bhupinder Frank    I personally reviewed the available images. Laboratory data and pathology as noted above was reviewed. I reviewed previous medical records, which are summarized in the HPI. I spent a total of 20 minutes of time including face to  "face time and indirect time during this visit, and more than 50% of the time was spent in counseling and coordination of care.    Sangeetha Mcwilliams MD MPH PhD       Virtual Visit Details    Originating Location (pt. Location): Home    Distant Location (provider location):  On-site      FOLLOW-UP VISIT    Patient Name: Teresa Sanderson      : 1975     Age: 48 year old        ______________________________________________________________________________     Chief Complaint   Patient presents with     Breast Cancer     Follow up for radiation     There were no vitals taken for this visit.     Date Radiation Completed: Radiation: Lt illiac 2000 cGy finishes 23      Pain  Current history of pain associated with this visit:   Intensity: 8/10 down to 5/10 when medication on board  Current: throbbing  Location: Low back  Treatment: Morphine and OTC medication    Labs  Other Labs: No    Imaging  None    Other Appointments: No    Residual Radiation side effect: Pt feels the radiation has help \"a little \" with pain control.    Additional Instructions:     Nurse face-to-face time: Level 3:  10 min phone time              Sangeetha Mcwilliams"

## 2023-08-08 NOTE — LETTER
2023         RE: Teresa Sanderson  [address] [facility] [address]        Dear Colleague,    Thank you for referring your patient, Teresa Sanderson, to the [facility] RADIATION ONCOLOGY. Please see a copy of my visit note below.    RADIATION ONCOLOGY FOLLOWUP EVALUATION    Patient Name: Teresa Sanderson  MRN: [id number]  : 1975    Date: 2023    Diagnosis: Metastatic breast cancer, HR+/HER2-    RADIATION THERAPY DELIVERED:   1. 30 Gy in 10 fractions to L3 --L5, completed on 21  2. 20 Gy in 5 fractions to left ilium, completed on 23     INTERVAL SINCE COMPLETION OF RADIATION THERAPY:   14 weeks    Virtual Visit Details    Type of service:  Video Visit   Video Start Time:  2:45  Video End Time: 3:00    Originating Location (pt. Location): Home  {PROVIDER LOCATION On-site should be selected for visits conducted from your clinic location or adjoining [facility] hospital, academic office, or other nearby [facility] building. Off-site should be selected for all other provider locations, including home:[id number]}  Distant Location (provider location):  On-site  Platform used for Video Visit: Telephone     History:   Teresa aSnderson is a 47 year old female with metastatic breast cancer that initially presented with back pain in 2020. Staging evaluation showed a left breast mass and lytic lesions of T7, L4 and pelvis. Her lumbar spine was treated with palliative radiation to a dose of 30Gy (2021). She was started on systemic therapy with Ibrance, Zoladex and Anastrozole with intermittent breaks due to social issues. She underwent bilateral salpingo-oophorectomies for ovarian suppression. More recently, restaging PET/CT showed left pelvis disease progression with associated left groin pain. She underwent palliative radiotherapy in this context for pain control. She was continued on Ibrance, Zoladex but Anastrozole was switched to Exemestane.     During her 6-week follow-up after  radiotherapy, Teresa reported no significant improvement in pain at the left pelvis site. The pain continued to be primarily felt in the left groin area. She spent a significant amount of time in bed. To manage pain, she used Neurontin and MS Contin.    On July 27, a follow-up PET CT scan showed findings suggestive of a partial metabolic response. There was an interval resolution of metabolic uptake in the previously noted sclerotic lesions of the left iliac wing. During the follow-up appointment with the medical oncologist on August 1, 2023, Teresa continued to report lower back pain that was not relieved by morphine, Tylenol, or NSAIDs. The pain resulted in her spending a significant amount of time in bed. She expressed feeling anxious about the scan results and having difficulty sleeping. Additionally, she was still grieving the loss of her son the previous year and felt she might be experiencing depression. Patient was told that PET/CT results from the previous week showed a positive response to treatment. The medical oncologist recommended continuing treatment with palbociclib and exemestane and following up with palliative care for further pain management.    Medical oncologist: Dr. Plunkett  Surgical oncologist: None    Interval History:  In the interim, the patient reports experiencing persistent and substantial lower back pain. This pain is restricting her participation in her birthday plans, which unfortunately were scheduled for today. She pinpoints the location of the pain to her mid-lower back, precisely around the area of her previous kyphoplasty. While she did consult with palliative care in February 2023, she is still awaiting the scheduling of her next palliative care appointment. As for her left pelvis, which was previously irradiated, she hasn't noticed any significant change in her pain level in that region.    Review of Systems: A complete 10 system review was negative except as noted in the  HPI above.    Physical Examination:  Video call    Data Review:  Imaging:  PET CT on 7/27/2023 compared to prior 4/15/2023      Decreased L pelvis uptake (left image is from April 2023 and right is from July 2023)    Impression and plan:   Ms. Sanderson is a 47 year old with a history of de víctor stage IV breast cancer metastatic to bone, ER/MO positive, HER2 equivocal and approximately 35% of cells by FISH negative by IHC, most recently on palbociclib and anastrozole, with plans to change to palbociclib plus exemestane.  Her past radiotherapy history is notable for palliative radiation to 30 Gy in 10 fractions to L3 to L5, completed on 1/7/21.  She was most recently treated with palliative radiotherapy for painful osseous metastasis to 20 Gy in 5 fractions to left ilium, completed on 5/5/23.     The patient demonstrated a response as evidenced by the PET/CT conducted on 7/27/2023, showing improvement in her sclerotic lesions on the left iliac wing following radiation. However, despite this response, the patient's lower back pain did not experience significant alleviation. This suggests that her pain likely originates not from her metastatic bone lesions but rather from her prior L4 kyphoplasty site.    The next step for this patient involves arranging a follow-up appointment with the palliative care team, considering that her pain remains uncontrolled. We will initiate contact with palliative care to facilitate the scheduling of this appointment at the earliest convenience. Subsequent follow-up with the patient will be conducted as necessary. All of her inquiries were addressed to her satisfaction verbally. We provided the patient with instructions to reach out if any questions or concerns arise during the interim period.    Follow-up with other providers is as follows:  Medical oncology: Dr. Jahaira Plunkett  Palliative oncology: Dr. Bhupinder Frank I personally reviewed the available images. Laboratory data and  "pathology as noted above was reviewed. I reviewed previous medical records, which are summarized in the HPI. I spent a total of 20 minutes of time including face to face time and indirect time during this visit, and more than 50% of the time was spent in counseling and coordination of care.    Sangeetha Mcwilliams MD MPH PhD       Virtual Visit Details    Originating Location (pt. Location): Home    Distant Location (provider location):  On-site      FOLLOW-UP VISIT    Patient Name: Teresa Sanderson      : 1975     Age: 48 year old        ______________________________________________________________________________     Chief Complaint   Patient presents with     Breast Cancer     Follow up for radiation     There were no vitals taken for this visit.     Date Radiation Completed: Radiation: Lt illiac 2000 cGy finishes 23      Pain  Current history of pain associated with this visit:   Intensity: 8/10 down to 5/10 when medication on board  Current: throbbing  Location: Low back  Treatment: Morphine and OTC medication    Labs  Other Labs: No    Imaging  None    Other Appointments: No    Residual Radiation side effect: Pt feels the radiation has help \"a little \" with pain control.    Additional Instructions:     Nurse face-to-face time: Level 3:  10 min phone time            Again, thank you for allowing me to participate in the care of your patient.        Sincerely,        Sangeetha Mcwilliams  "

## 2023-08-22 NOTE — PROGRESS NOTES
INTERVENTIONAL RADIOLOGY CONSULT    Patient is a 44 y/o female with history of metastatic breast cancer with osseous metastatic disease. Patient is status post L4 RFA and kyphoplasty on 5/17/2021 for recalcitrant pain of an osseous met following radiation therapy. Patient admitted for uncontrolled pain post procedure.     SUBJECTIVE: Patient states that her pain is much better today but still requiring some IV dilaudid. She states that she was not able to sleep much last night because she was still in pain. Had a craving for ice cream last night but did not eat much solid food. She has a good appetite today and has ordered breakfast.     Says her normal tingling sensation in her upper right thigh is gone following the procedure. Says she does not notice any new weakness in the legs. Was able to get up to go to the restroom a couple of times with the walker. Had fairly substantial pain when she was trying to get up.     Says that normally she is able to walk with a walker at home. Her daughter helps her a lot. She is not able to carry groceries up the stairs anymore. She has had several falls at home in the past. States she has fallen down the stairs (3 steps) but did not have any significant trauma. Says she tries to keep her independence as much as she can.       OBJECTIVE:    Physical Exam:     B/P: 104/64, T: 96.1, P: 60, R: 18     Patient is wide awake, AAO x3 sitting upright in bed. Back bandages are clean, dry, and intact. This area is TTP. Non labored breathing. 4/5 strength bilateral lower extremities in all motions. Slight decrease sensation in the L4 and L5 distribution on the right which is stable.      CBC RESULTS:   Recent Labs   Lab Test 05/13/21  1109   WBC 4.4   RBC 3.28*   HGB 10.6*   HCT 32.5*   MCV 99   MCH 32.3   MCHC 32.6   RDW 14.5         Lab Results   Component Value Date    INR 1.03 05/17/2021    INR Canceled, Test credited 05/17/2021     Lab Results   Component Value Date      05/13/2021     04/20/2021       Imaging: None    PLAN: Patient is overall doing much better but is still requiring some IV pain medication. Patient was notably weaker in the RLE yesterday on the preprocedure exam but has improved to being symmetric 4/5 strength on post exam. Her right upper thigh paresthesia has resolved following the procedure.     --Suspect patient will be able to be discharged today but will need to be completely controlled on oral pain medication prior to discharge. Patient's uncontrolled pain post procedure is likely related to not taking her home pain medication prior to the procedure and then trying to catch up post procedure.     --Of note, patient stated yesterday she takes her MS Contin 4-5x/day but listed as q12hrs. This may need to be readdressed.    --Also, patient admits to several falls at home. Perhaps a home safety evaluation may be of benefit.     --No further intervention from our service. We will continue to see the patient daily if she remains in the hospital.     Thank you for taking care of this patient for our service.       Karthik Mccoy DO  Interventional Radiology Resident  Pager [phone]  Call Pager for After Hours: [phone]     I, Dr Santo Wiggins, discussed the case with the fellow and agree with the findings as documented in the assessment and plan.    Santo Wiggins MD    Vascular and Interventional Radiolgy  [facility]     tingling/CHEST DISCOMFORT

## 2023-08-30 ENCOUNTER — TELEPHONE (OUTPATIENT)
Dept: ONCOLOGY | Facility: CLINIC | Age: 48
End: 2023-08-30
Payer: MEDICARE

## 2023-08-30 DIAGNOSIS — C50.812 MALIGNANT NEOPLASM OF OVERLAPPING SITES OF LEFT BREAST IN FEMALE, ESTROGEN RECEPTOR POSITIVE (H): Primary | ICD-10-CM

## 2023-08-30 DIAGNOSIS — C50.912 CARCINOMA OF LEFT BREAST METASTATIC TO BONE (H): ICD-10-CM

## 2023-08-30 DIAGNOSIS — Z17.0 MALIGNANT NEOPLASM OF OVERLAPPING SITES OF LEFT BREAST IN FEMALE, ESTROGEN RECEPTOR POSITIVE (H): Primary | ICD-10-CM

## 2023-08-30 DIAGNOSIS — C79.51 CARCINOMA OF LEFT BREAST METASTATIC TO BONE (H): ICD-10-CM

## 2023-08-30 NOTE — TELEPHONE ENCOUNTER
Oral Chemotherapy Monitoring Program    Subjective/Objective:  Teresa Sanderson is a 48 year old female contacted by phone for a follow-up visit for oral chemotherapy.  Teresa confirms taking the appropriate dose of Ibrance 125mg daily. Denies medication changes and recent hospital or ED visits. Teresa shares she had nausea for the first week after infusion.  She had some anti-nausea medication on hand which helped.  She missed 3 doses of her Ibrance during this time as she did not feel like she would be able to keep it down.  Teresa was also experiencing some watery diarrhea which is getting better.  She did not take any Imodium.  I recommended she pick some up and take it as stated on the back of the package.  She feels like her side effects are manageable at this time.         2/13/2023     7:00 AM 4/10/2023     2:00 PM 5/10/2023     7:00 AM 6/26/2023    11:00 AM 7/20/2023     3:00 PM 8/30/2023    11:00 AM 8/30/2023     4:00 PM   ORAL CHEMOTHERAPY   Assessment Type Refill Lab Monitoring Refill Monthly Follow up Lab Monitoring Refill Other   Diagnosis Code Breast Cancer Breast Cancer Breast Cancer Breast Cancer Breast Cancer Breast Cancer Breast Cancer   Providers Dr. Dimitrios Plunkett   [facility]   Is this patient followed by the [facility] team?      Yes Yes   Drug Name Ibrance (palbociclib) Ibrance (palbociclib) Ibrance (palbociclib) Ibrance (palbociclib) Ibrance (palbociclib) Ibrance (palbociclib) Ibrance (palbociclib)   Dose 125 mg 125 mg 125 mg 125 mg 125 mg 125 mg 125 mg   Current Schedule Daily Daily Daily Daily Daily Daily Daily   Cycle Details 3 weeks on, 1 week off 3 weeks on, 1 week off 3 weeks on, 1 week off 3 weeks on, 1 week off 3 weeks on, 1 week off 3 weeks on, 1 week off 3 weeks on, 1 week off   Doses missed in last 2 weeks    3   3   Adherence Assessment     Non-adherent   Adherent   Adverse Effects  Anemia  Nausea;Constipation   Nausea;Diarrhea   Nausea    Grade 1   Grade 1   Pharmacist Intervention(nausea)    No   No   Constipation    Grade 1      Pharmacist Intervention(constipation)    No      Diarrhea       Grade 1   Pharmacist Intervention(diarrhea)       Yes   Intervention(s)       OTC recommendation   Anemia  Grade 1        Pharmacist Intervention(anemia)  No        Any new drug interactions?       No   Is the dose as ordered appropriate for the patient?  Yes     Yes   Since the last time we talked, have you been hospitalized or used the emergency room?  No     No       Last PHQ-2 Score on record:       10/4/2022    10:02 AM 5/13/2021     9:27 AM   PHQ-2 ( 1999 [employer])   Q1: Little interest or pleasure in doing things 3 1   Q2: Feeling down, depressed or hopeless 3 1   PHQ-2 Score 6 2   PHQ-2 Total Score (12-17 Years)- Positive if 3 or more points; Administer PHQ-A if positive  2       Vitals:  BP:   BP Readings from Last 1 Encounters:   08/01/23 (!) 146/99     Wt Readings from Last 1 Encounters:   08/01/23 109.5 kg (241 lb 8 oz)     Estimated body surface area is 2.36 meters squared as calculated from the following:    Height as of 12/16/22: 1.829 m (6').    Weight as of 8/1/23: 109.5 kg (241 lb 6.5 oz).    Labs:  _  Result Component Current Result Ref Range   Sodium 142 (8/1/2023) 136 - 145 mmol/L     _  Result Component Current Result Ref Range   Potassium 3.7 (8/1/2023) 3.4 - 5.3 mmol/L     _  Result Component Current Result Ref Range   Calcium 9.5 (8/1/2023) 8.6 - 10.0 mg/dL     No results found for Mag within last 30 days.     No results found for Phos within last 30 days.     _  Result Component Current Result Ref Range   Albumin 4.3 (8/1/2023) 3.5 - 5.2 g/dL     _  Result Component Current Result Ref Range   Urea Nitrogen 18.5 (8/1/2023) 6.0 - 20.0 mg/dL     _  Result Component Current Result Ref Range   Creatinine 1.04 (H) (8/1/2023) 0.51 - 0.95 mg/dL      _  Result Component Current Result Ref Range   AST 17 (8/1/2023) 0 - 45 U/L     _  Result Component Current Result Ref Range   ALT 19 (8/1/2023) 0 - 50 U/L     _  Result Component Current Result Ref Range   Bilirubin Total 0.3 (8/1/2023) <=1.2 mg/dL     _  Result Component Current Result Ref Range   WBC Count 3.1 (L) (8/1/2023) 4.0 - 11.0 10e3/uL     _  Result Component Current Result Ref Range   Hemoglobin 12.1 (8/1/2023) 11.7 - 15.7 g/dL     _  Result Component Current Result Ref Range   Platelet Count 214 (8/1/2023) 150 - 450 10e3/uL     _  Result Component Current Result Ref Range   Absolute Neutrophils 1.8 (8/1/2023) 1.6 - 8.3 10e3/uL     No results found for ANC within last 30 days.          Assessment/Plan:  Teresa is tolerating Ibrance therapy with some nausea and watery diarrhea this cycle that she has not experienced before.  She was able to manage the nausea with anti-nausea medication and the diarrhea is getting better.  She will  some Imodium to have on hand going forward.  She feels like the side effects are manageable at this time.  PDC=1.0, no adherence concerns.  Will continue Ibrance therapy as planned.     Follow-Up:  9/26: Visit with Alee + labs  10/25: Labs  2/22: Biannual assessment    Refill Due:  [facility] to deliver refill on 9/7/23.    Lili Vargas, PharmD  Hematology/Oncology Clinical Pharmacist  [facility]  [phone]

## 2023-09-28 ENCOUNTER — PATIENT OUTREACH (OUTPATIENT)
Dept: ONCOLOGY | Facility: CLINIC | Age: 48
End: 2023-09-28
Payer: MEDICARE

## 2023-09-28 NOTE — PROGRESS NOTES
[facility]: Cancer Care                                                                                          Pt needing a lab and provider appt, she has cancelled the last couple. LVM limited information requesting a call back to discuss.Pt does not appear to use [employer] much. Will send message to scheduling to also call and try to get a hold of her to schedule appts.    Signature:  Myriam Ramirez, RICHARDCC

## 2023-09-29 ENCOUNTER — PATIENT OUTREACH (OUTPATIENT)
Dept: ONCOLOGY | Facility: CLINIC | Age: 48
End: 2023-09-29
Payer: MEDICARE

## 2023-09-29 NOTE — PROGRESS NOTES
[facility]: Cancer Care                                                                                          Called pt to discuss rescheduling her appt- pt expressed being ok with scheduling it and reported that she has been having pain in upper back into back and side of head behind her ear. She reported she had thought about going to the ED but has not. Does not have any recent orders for pain meds.   Discussed reasoning for appts that when she is on the Ibrance we need to have regular monitoring of labs and seeing her to make sure she is tolerating it and her labs are ok. Pt verbalized understanding.  Sent a msg to scheduling to schedule patent in next 1-2 weeks requesting sooner than later for appt due to comfort issues.   Pt also reporting that she needs a refill on her Exemstane. Update to ALEKSANDRA Alee to look at this.     Signature:  Myriam Ramirez RNCC

## 2023-10-04 NOTE — PROGRESS NOTES
Oncology Visit:   Date on this visit: Oct 5, 2023    Diagnosis:  ER positive left breast cancer metastasized to bones.     Primary Physician: No Ref-Primary, Physician     History Of Present Illness:    Ms. Sanderson is a 48 year old female with a h/o tobacco abuse and DVTs with left breast cancer metastasized to bone. She presented to [facility] ED with back pain on 12/5/2020. MRI of the L-spine showed an abnormal L4 lesion with associated right paraspinal mass, abnormalities in L5 and the left iliac bone were also seen. CT C/A/P showed a left breast mass, lytic lesions of T7, L4, and the pelvis, and a 3 cm lesion in the kidney (thought to be a cyst). Ultrasound of the bilateral lower extremities showed a non-occlusive thrombus in the left popliteal vein. Mammogram and ultrasound of the bilateral breasts on 12/17/2020 showed a spiculated mass measuring at least 7.8 cm at 12-1:00 left breast extending from the nipple to 9 cm from the nipple with associated nipple retraction. This mass was biopsied, and showed IDC with surrounding DCIS, grade 3, ER+ 90%, and MO+ 75%.  HER2 was equivocal in approximately 35% of tumor cells by FISH and was negative by IHC.    Metastatic Breast Cancer Treatment:  12/23/2021 - 1/7/2021  Radiation (3000 cGy) to the lumbar spine.  1/29/2021 - present  Ibrance, zoladex, and anastrozole.  5/17/2021 radiofrequency ablation, kyphoplasty to L4  8/20/2021  Bilateral salpingo-oophorectomies, Ibrance and anastrozole.  She was off anastrozole 12/2021 - 2/2022 and off Ibrance 01/2022 - 02/2022 due to stress and impending homelessness. Off both again 03/2022-04/2022 due to death of her son but restarted in 05/2022.  04/2023  PET/CT with progression in 2 small metastases in the left pelvis.  Palbociclib continued.  Anastrozole changed to exemestane  5/5/2023  Completed radiation, 2000 cGy in 5 fractions, to the left ilium.    Interval History:   Ms. Sanderson presents to clinic for metastatic breast  cancer follow up. She missed a scheduled appointment last week. She states her mental health has not been in a good place and she has missed several appointments. She is going to reach out to Dr. Tyson to reschedule. Overall she has physically felt about the same. Her L groin pain has completely resolved after radiation. Her back pain is the same but comes and goes. She spends a lot of time in bed which is mostly from low mood. When she socializes or does things outside the house she often feels better. She does feel limited in how much she can physically do with her back pain and deconditioning. She does feel more ready now to follow through with PT and see palliative care.     No recent fevers/chills or infectious concerns. No cough or SOB. No issues with nausea or bowels. She has been eating well. No other pain. She reports compliance with taking medications.      Past Medical/Surgical History:   Past Medical History:   Diagnosis Date    Anxiety     Breast CA (H) 12/2020    Depression     DVT (deep venous thrombosis) (H) 2014    Left breast mass     x approximately 4-5 months    Pulmonary emboli (H)     Pyelonephritis     Schizoaffective disorder (H)     Tobacco use      Past Surgical History:   Procedure Laterality Date    COLONOSCOPY N/A 7/8/2022    Procedure: COLONOSCOPY, WITH POLYPECTOMY;  Surgeon: Ham Cano MD;  Location: [facility]    IR LUMBAR KYPHOPLASTY VERTEBRAE  5/17/2021    LAPAROSCOPIC SALPINGO-OOPHORECTOMY Bilateral 8/20/2021    Procedure: BILATERAL SALPINGO-OOPHORECTOMY, LAPAROSCOPIC;  Surgeon: Rory Lopez MD;  Location: [facility] OR    TUBAL LIGATION  1998        Allergies   Allergen Reactions    Contrast Dye      Pt developed nausea after isovue 370 injection on 6/9/21        Current Outpatient Medications   Medication    exemestane (AROMASIN) 25 MG tablet    QUEtiapine (SEROQUEL) 100 MG tablet    gabapentin (NEURONTIN) 300 MG capsule    methocarbamol (ROBAXIN) 500 MG tablet     methylPREDNISolone (MEDROL) 32 MG tablet    morphine (MS CONTIN) 15 MG CR tablet    naloxone (NARCAN) 4 MG/0.1ML nasal spray    nicotine (COMMIT) 2 MG lozenge    nicotine (NICORETTE) 2 MG gum    ondansetron (ZOFRAN) 8 MG tablet    palbociclib (IBRANCE) 125 MG tablet    pantoprazole (PROTONIX) 40 MG EC tablet    polyethylene glycol (MIRALAX) 17 GM/Dose powder    prochlorperazine (COMPAZINE) 10 MG tablet    rivaroxaban ANTICOAGULANT (XARELTO) 20 MG TABS tablet    SENNA-docusate sodium (SENNA S) 8.6-50 MG tablet    sertraline (ZOLOFT) 100 MG tablet    sertraline (ZOLOFT) 50 MG tablet    tolnaftate (TINACTIN) 1 % external cream    Vitamin D3 (CHOLECALCIFEROL) 25 mcg (1000 units) tablet     No current facility-administered medications for this visit.   '  Physical Exam:   /85 (BP Location: Right arm, Patient Position: Sitting, Cuff Size: Adult Large)   Pulse 69   Temp 98  F (36.7  C) (Oral)   Resp 16   Wt 107 kg (235 lb 14.4 oz)   SpO2 96%   BMI 31.99 kg/m    General:  Well appearing adult female in NAD.  Pleasant, in no acute distress.  HEENT:  Normocephalic.  Sclera anicteric. Enlarged thyroid on exam.  MMM.  Lymph:  No palpable cervical, supraclavicular, or axillary LAD.   Chest:  CTA bilaterally.  No wheezes or crackles.  CV:  RRR.   Abd:  Soft/ND/NT +BS    Ext:  No edema of the bilateral lower extremities.    Neuro:  Cranial nerves grossly intact.  Alert and oriented x 3.  Psych:  Mood appears somewhat depressed.  Skin:  No visible concerning skin rashes or lesions.    Laboratory/Imaging Studies:   I personally reviewed the below images and laboratories:   10/05/23 13:16   Sodium 143   Potassium 3.8   Chloride 106   Carbon Dioxide (CO2) 24   Urea Nitrogen 11.3   Creatinine 0.79   GFR Estimate >90   Calcium 9.7   Anion Gap 13   Albumin 4.4   Protein Total 7.7   Alkaline Phosphatase 99   ALT 11   AST 12   Bilirubin Total 0.6   Glucose 123 (H)   WBC 3.8 (L)   Hemoglobin 11.9   Hematocrit 34.8 (L)   Platelet  Count 263   RBC Count 3.50 (L)   MCV 99   MCH 34.0 (H)   MCHC 34.2   RDW 13.7   % Neutrophils 69   % Lymphocytes 23   % Monocytes 5   % Eosinophils 2   % Basophils 1   Absolute Basophils 0.0   Absolute Eosinophils 0.1   Absolute Immature Granulocytes 0.0   Absolute Lymphocytes 0.9   Absolute Monocytes 0.2   % Immature Granulocytes 0   Absolute Neutrophils 2.6   Absolute NRBCs 0.0   NRBCs per 100 WBC 0      01/05/23 13:01 03/16/23 16:50 04/10/23 11:50 05/15/23 12:55 07/20/23 13:31 08/01/23 08:41   Cancer Antigen 15-3 40 (H) 51 (H) 44 (H) 48 (H) 28 (H) 36 (H)   CEA 2.9 2.9 2.9 3.6 2.5 2.6         ASSESSMENT/PLAN:   48 year old female with history of DVT and ER positive left breast cancer metastasized to bones.    1.  Metastatic breast cancer: She is on treatment with palbociclib and exemestane. PET/CT in 04/2023 showed disease progression in two small bone metastasis in L pelvis. She received radiation to these lesions.  She was continued on palbociclib but anastrozole was changed to exemestane.   - PET/CT in July showed a positive response to treatment.  - Continue treatment with palbociclib and exemestane.  - Continue to monitor monthly labs and tumor markers. Tumor markers initially improved and were a little elevated in August. Pending from today.   - Enforced we need to continue to follow monthly labs and have every 2 month provider visits in order to safely give her treatment. She is agreeable to this.   - Repeat PET/CT in 01/2024, sooner if symptomatic of disease progression or increasing trend in tumor markers.   - At time of disease progression would recommend biopsy with NGS to evaluate for ESR1 and PIK3CA mutations as well as consider next line treatment with abemaciclib and fulvestrant.    2.  Bone metastases/back pain:  She is s/p XRT to the left ilium as well as the lumbar spine and kyphoplasty of L4--there was some extravasation of cement which procedularist is aware of and recommended continued AC  indefinitely. Continue MS Contin, as well as robaxin, gabapentin, NSAIDs.   - Back pain has not improved as much as would be expected with degree of cancer response so may be residual from procedure and sclerotic lesions. I think deconditioning and muscle atrophy also play a role. Referral for PT was placed again. Follow-up with palliative care.   - Receiving Zometa once every 3 months.  Will schedule for early November.     3. Depression: Secondary to loss of her son last year and exacerbated by progressive disease this last April.   - She will follow-up with Dr. Tyson and therapist.     4.  Schizoaffective disorder, bipolar type: No change since last visit.  She is on treatment with Zoloft and Seroquel.  Ongoing follow up with psychiatry.     5.  DVT:  No change since last visit.  She continues on Xarelto.    6.  Enlarged thyroid: No change since last visit.  Biopsy on 1/23 c/w thyroiditis.     Greater than 40 minutes was spent with this patient with greater than 20 minutes spent in counseling and coordination of care.    Alee De Los Santos PA-C ([facility])

## 2023-10-05 ENCOUNTER — APPOINTMENT (OUTPATIENT)
Dept: LAB | Facility: CLINIC | Age: 48
End: 2023-10-05
Attending: PHYSICIAN ASSISTANT
Payer: MEDICARE

## 2023-10-05 ENCOUNTER — ONCOLOGY VISIT (OUTPATIENT)
Dept: ONCOLOGY | Facility: CLINIC | Age: 48
End: 2023-10-05
Attending: PHYSICIAN ASSISTANT
Payer: MEDICARE

## 2023-10-05 VITALS
SYSTOLIC BLOOD PRESSURE: 125 MMHG | RESPIRATION RATE: 16 BRPM | WEIGHT: 235.9 LBS | HEART RATE: 69 BPM | BODY MASS INDEX: 31.99 KG/M2 | TEMPERATURE: 98 F | DIASTOLIC BLOOD PRESSURE: 85 MMHG | OXYGEN SATURATION: 96 %

## 2023-10-05 DIAGNOSIS — C79.51 CARCINOMA OF LEFT BREAST METASTATIC TO BONE (H): Primary | ICD-10-CM

## 2023-10-05 DIAGNOSIS — Z51.11 ENCOUNTER FOR ANTINEOPLASTIC CHEMOTHERAPY: ICD-10-CM

## 2023-10-05 DIAGNOSIS — R53.81 PHYSICAL DECONDITIONING: ICD-10-CM

## 2023-10-05 DIAGNOSIS — C50.912 CARCINOMA OF LEFT BREAST METASTATIC TO BONE (H): Primary | ICD-10-CM

## 2023-10-05 DIAGNOSIS — F25.0 SCHIZOAFFECTIVE DISORDER, BIPOLAR TYPE (H): ICD-10-CM

## 2023-10-05 LAB
ALBUMIN SERPL BCG-MCNC: 4.4 G/DL (ref 3.5–5.2)
ALP SERPL-CCNC: 99 U/L (ref 35–104)
ALT SERPL W P-5'-P-CCNC: 11 U/L (ref 0–50)
ANION GAP SERPL CALCULATED.3IONS-SCNC: 13 MMOL/L (ref 7–15)
AST SERPL W P-5'-P-CCNC: 12 U/L (ref 0–45)
BASO+EOS+MONOS # BLD AUTO: ABNORMAL 10*3/UL
BASO+EOS+MONOS NFR BLD AUTO: ABNORMAL %
BASOPHILS # BLD AUTO: 0 10E3/UL (ref 0–0.2)
BASOPHILS NFR BLD AUTO: 1 %
BILIRUB SERPL-MCNC: 0.6 MG/DL
BUN SERPL-MCNC: 11.3 MG/DL (ref 6–20)
CALCIUM SERPL-MCNC: 9.7 MG/DL (ref 8.6–10)
CANCER AG15-3 SERPL-ACNC: 40 U/ML
CEA SERPL-MCNC: 2.8 NG/ML
CHLORIDE SERPL-SCNC: 106 MMOL/L (ref 98–107)
CREAT SERPL-MCNC: 0.79 MG/DL (ref 0.51–0.95)
DEPRECATED HCO3 PLAS-SCNC: 24 MMOL/L (ref 22–29)
EGFRCR SERPLBLD CKD-EPI 2021: >90 ML/MIN/1.73M2
EOSINOPHIL # BLD AUTO: 0.1 10E3/UL (ref 0–0.7)
EOSINOPHIL NFR BLD AUTO: 2 %
ERYTHROCYTE [DISTWIDTH] IN BLOOD BY AUTOMATED COUNT: 13.7 % (ref 10–15)
GLUCOSE SERPL-MCNC: 123 MG/DL (ref 70–99)
HCT VFR BLD AUTO: 34.8 % (ref 35–47)
HGB BLD-MCNC: 11.9 G/DL (ref 11.7–15.7)
IMM GRANULOCYTES # BLD: 0 10E3/UL
IMM GRANULOCYTES NFR BLD: 0 %
LYMPHOCYTES # BLD AUTO: 0.9 10E3/UL (ref 0.8–5.3)
LYMPHOCYTES NFR BLD AUTO: 23 %
MCH RBC QN AUTO: 34 PG (ref 26.5–33)
MCHC RBC AUTO-ENTMCNC: 34.2 G/DL (ref 31.5–36.5)
MCV RBC AUTO: 99 FL (ref 78–100)
MONOCYTES # BLD AUTO: 0.2 10E3/UL (ref 0–1.3)
MONOCYTES NFR BLD AUTO: 5 %
NEUTROPHILS # BLD AUTO: 2.6 10E3/UL (ref 1.6–8.3)
NEUTROPHILS NFR BLD AUTO: 69 %
NRBC # BLD AUTO: 0 10E3/UL
NRBC BLD AUTO-RTO: 0 /100
PLATELET # BLD AUTO: 263 10E3/UL (ref 150–450)
POTASSIUM SERPL-SCNC: 3.8 MMOL/L (ref 3.4–5.3)
PROT SERPL-MCNC: 7.7 G/DL (ref 6.4–8.3)
RBC # BLD AUTO: 3.5 10E6/UL (ref 3.8–5.2)
SODIUM SERPL-SCNC: 143 MMOL/L (ref 135–145)
WBC # BLD AUTO: 3.8 10E3/UL (ref 4–11)

## 2023-10-05 PROCEDURE — 82378 CARCINOEMBRYONIC ANTIGEN: CPT | Performed by: PHYSICIAN ASSISTANT

## 2023-10-05 PROCEDURE — 99215 OFFICE O/P EST HI 40 MIN: CPT | Performed by: PHYSICIAN ASSISTANT

## 2023-10-05 PROCEDURE — G0463 HOSPITAL OUTPT CLINIC VISIT: HCPCS | Performed by: PHYSICIAN ASSISTANT

## 2023-10-05 PROCEDURE — 85025 COMPLETE CBC W/AUTO DIFF WBC: CPT | Performed by: PHYSICIAN ASSISTANT

## 2023-10-05 PROCEDURE — 36415 COLL VENOUS BLD VENIPUNCTURE: CPT | Performed by: PHYSICIAN ASSISTANT

## 2023-10-05 PROCEDURE — 80053 COMPREHEN METABOLIC PANEL: CPT | Performed by: PHYSICIAN ASSISTANT

## 2023-10-05 PROCEDURE — 86300 IMMUNOASSAY TUMOR CA 15-3: CPT | Performed by: PHYSICIAN ASSISTANT

## 2023-10-05 RX ORDER — QUETIAPINE FUMARATE 100 MG/1
300 TABLET, FILM COATED ORAL AT BEDTIME
Qty: 90 TABLET | Refills: 1 | Status: SHIPPED | OUTPATIENT
Start: 2023-10-05 | End: 2023-12-08

## 2023-10-05 RX ORDER — EXEMESTANE 25 MG/1
25 TABLET ORAL DAILY
Qty: 90 TABLET | Refills: 3 | Status: SHIPPED | OUTPATIENT
Start: 2023-10-05 | End: 2024-08-05

## 2023-10-05 ASSESSMENT — PAIN SCALES - GENERAL: PAINLEVEL: MODERATE PAIN (5)

## 2023-10-05 NOTE — NURSING NOTE
Oncology Rooming Note    October 5, 2023 1:22 PM   Teresa Sanderson is a 48 year old female who presents for:    Chief Complaint   Patient presents with    Breast Cancer    Blood Draw     Labs drawn via  by RN. VS taken.     Initial Vitals: /85 (BP Location: Right arm, Patient Position: Sitting, Cuff Size: Adult Large)   Pulse 69   Temp 98  F (36.7  C) (Oral)   Resp 16   Wt 107 kg (235 lb 14.4 oz)   SpO2 96%   BMI 31.99 kg/m   Estimated body mass index is 31.99 kg/m  as calculated from the following:    Height as of 12/16/22: 1.829 m (6').    Weight as of this encounter: 107 kg (235 lb 14.4 oz). Body surface area is 2.33 meters squared.  Moderate Pain (5) Comment: Data Unavailable   No LMP recorded. (Menstrual status: Tubal ).  Allergies reviewed: Yes  Medications reviewed: Yes    Medications: MEDICATION REFILLS NEEDED TODAY. Provider was notified.  Pharmacy name entered into [facility]:    [id number] - [place], [place] - [address] AT [facility] [address]  [facility] - [place], [place] - [address]  [id number] - [place] - [address] [facility] [address] [facility]  [facility] #[id number] - [place], [place] - [address] AT [address] [place] [address] & [facility] - [place], [place] - [address]    Clinical concerns: Pt needs refill for exemestane (AROMASIN) 25 MG tablets.       Marguerite Lorenz

## 2023-10-05 NOTE — LETTER
10/5/2023         RE: Teresa Sanderson  [address] [address]        Dear Colleague,    Thank you for referring your patient, Teresa Sanderson, to the [facility]. Please see a copy of my visit note below.    Oncology Visit:   Date on this visit: Oct 5, 2023    Diagnosis:  ER positive left breast cancer metastasized to bones.     Primary Physician: No Ref-Primary, Physician     History Of Present Illness:    Ms. Sanderson is a 48 year old female with a h/o tobacco abuse and DVTs with left breast cancer metastasized to bone. She presented to [place] ED with back pain on 12/5/2020. MRI of the L-spine showed an abnormal L4 lesion with associated right paraspinal mass, abnormalities in L5 and the left iliac bone were also seen. CT C/A/P showed a left breast mass, lytic lesions of T7, L4, and the pelvis, and a 3 cm lesion in the kidney (thought to be a cyst). Ultrasound of the bilateral lower extremities showed a non-occlusive thrombus in the left popliteal vein. Mammogram and ultrasound of the bilateral breasts on 12/17/2020 showed a spiculated mass measuring at least 7.8 cm at 12-1:00 left breast extending from the nipple to 9 cm from the nipple with associated nipple retraction. This mass was biopsied, and showed IDC with surrounding DCIS, grade 3, ER+ 90%, and TX+ 75%.  HER2 was equivocal in approximately 35% of tumor cells by FISH and was negative by IHC.    Metastatic Breast Cancer Treatment:  12/23/2021 - 1/7/2021  Radiation (3000 cGy) to the lumbar spine.  1/29/2021 - present  Ibrance, zoladex, and anastrozole.  5/17/2021 radiofrequency ablation, kyphoplasty to L4  8/20/2021  Bilateral salpingo-oophorectomies, Ibrance and anastrozole.  She was off anastrozole 12/2021 - 2/2022 and off Ibrance 01/2022 - 02/2022 due to stress and impending homelessness. Off both again 03/2022-04/2022 due to death of her son but restarted in 05/2022.  04/2023  PET/CT with  progression in 2 small metastases in the left pelvis.  Palbociclib continued.  Anastrozole changed to exemestane  5/5/2023  Completed radiation, 2000 cGy in 5 fractions, to the left ilium.    Interval History:   Ms. Sanderson presents to clinic for metastatic breast cancer follow up. She missed a scheduled appointment last week. She states her mental health has not been in a good place and she has missed several appointments. She is going to reach out to Dr. Tyson to reschedule. Overall she has physically felt about the same. Her L groin pain has completely resolved after radiation. Her back pain is the same but comes and goes. She spends a lot of time in bed which is mostly from low mood. When she socializes or does things outside the house she often feels better. She does feel limited in how much she can physically do with her back pain and deconditioning. She does feel more ready now to follow through with PT and see palliative care.     No recent fevers/chills or infectious concerns. No cough or SOB. No issues with nausea or bowels. She has been eating well. No other pain. She reports compliance with taking medications.      Past Medical/Surgical History:   Past Medical History:   Diagnosis Date    Anxiety     Breast CA (H) 12/2020    Depression     DVT (deep venous thrombosis) (H) 2014    Left breast mass     x approximately 4-5 months    Pulmonary emboli (H)     Pyelonephritis     Schizoaffective disorder (H)     Tobacco use      Past Surgical History:   Procedure Laterality Date    COLONOSCOPY N/A 7/8/2022    Procedure: COLONOSCOPY, WITH POLYPECTOMY;  Surgeon: Ham Cano MD;  [place] [facility] [place]    IR LUMBAR KYPHOPLASTY VERTEBRAE  5/17/2021    LAPAROSCOPIC SALPINGO-OOPHORECTOMY Bilateral 8/20/2021    Procedure: BILATERAL SALPINGO-OOPHORECTOMY, LAPAROSCOPIC;  Surgeon: Rory Lopez MD;  Location: [facility] OR    TUBAL LIGATION  1998        Allergies   Allergen Reactions    Contrast Dye      Pt developed  nausea after isovue 370 injection on 6/9/21        Current Outpatient Medications   Medication    exemestane (AROMASIN) 25 MG tablet    QUEtiapine (SEROQUEL) 100 MG tablet    gabapentin (NEURONTIN) 300 MG capsule    methocarbamol (ROBAXIN) 500 MG tablet    methylPREDNISolone (MEDROL) 32 MG tablet    morphine (MS CONTIN) 15 MG CR tablet    naloxone (NARCAN) 4 MG/0.1ML nasal spray    nicotine (COMMIT) 2 MG lozenge    nicotine (NICORETTE) 2 MG gum    ondansetron (ZOFRAN) 8 MG tablet    palbociclib (IBRANCE) 125 MG tablet    pantoprazole (PROTONIX) 40 MG EC tablet    polyethylene glycol (MIRALAX) 17 GM/Dose powder    prochlorperazine (COMPAZINE) 10 MG tablet    rivaroxaban ANTICOAGULANT (XARELTO) 20 MG TABS tablet    SENNA-docusate sodium (SENNA S) 8.6-50 MG tablet    sertraline (ZOLOFT) 100 MG tablet    sertraline (ZOLOFT) 50 MG tablet    tolnaftate (TINACTIN) 1 % external cream    Vitamin D3 (CHOLECALCIFEROL) 25 mcg (1000 units) tablet     No current facility-administered medications for this visit.   '  Physical Exam:   /85 (BP Location: Right arm, Patient Position: Sitting, Cuff Size: Adult Large)   Pulse 69   Temp 98  F (36.7  C) (Oral)   Resp 16   Wt 107 kg (235 lb 14.4 oz)   SpO2 96%   BMI 31.99 kg/m    General:  Well appearing adult female in NAD.  Pleasant, in no acute distress.  HEENT:  Normocephalic.  Sclera anicteric. Enlarged thyroid on exam.  MMM.  Lymph:  No palpable cervical, supraclavicular, or axillary LAD.   Chest:  CTA bilaterally.  No wheezes or crackles.  CV:  RRR.   Abd:  Soft/ND/NT +BS    Ext:  No edema of the bilateral lower extremities.    Neuro:  Cranial nerves grossly intact.  Alert and oriented x 3.  Psych:  Mood appears somewhat depressed.  Skin:  No visible concerning skin rashes or lesions.    Laboratory/Imaging Studies:   I personally reviewed the below images and laboratories:   10/05/23 13:16   Sodium 143   Potassium 3.8   Chloride 106   Carbon Dioxide (CO2) 24   Urea  Nitrogen 11.3   Creatinine 0.79   GFR Estimate >90   Calcium 9.7   Anion Gap 13   Albumin 4.4   Protein Total 7.7   Alkaline Phosphatase 99   ALT 11   AST 12   Bilirubin Total 0.6   Glucose 123 (H)   WBC 3.8 (L)   Hemoglobin 11.9   Hematocrit 34.8 (L)   Platelet Count 263   RBC Count 3.50 (L)   MCV 99   MCH 34.0 (H)   MCHC 34.2   RDW 13.7   % Neutrophils 69   % Lymphocytes 23   % Monocytes 5   % Eosinophils 2   % Basophils 1   Absolute Basophils 0.0   Absolute Eosinophils 0.1   Absolute Immature Granulocytes 0.0   Absolute Lymphocytes 0.9   Absolute Monocytes 0.2   % Immature Granulocytes 0   Absolute Neutrophils 2.6   Absolute NRBCs 0.0   NRBCs per 100 WBC 0      01/05/23 13:01 03/16/23 16:50 04/10/23 11:50 05/15/23 12:55 07/20/23 13:31 08/01/23 08:41   Cancer Antigen 15-3 40 (H) 51 (H) 44 (H) 48 (H) 28 (H) 36 (H)   CEA 2.9 2.9 2.9 3.6 2.5 2.6     ASSESSMENT/PLAN:   48 year old female with history of DVT and ER positive left breast cancer metastasized to bones.    1.  Metastatic breast cancer: She is on treatment with palbociclib and exemestane. PET/CT in 04/2023 showed disease progression in two small bone metastasis in L pelvis. She received radiation to these lesions.  She was continued on palbociclib but anastrozole was changed to exemestane.   - PET/CT in July showed a positive response to treatment.  - Continue treatment with palbociclib and exemestane.  - Continue to monitor monthly labs and tumor markers. Tumor markers initially improved and were a little elevated in August. Pending from today.   - Enforced we need to continue to follow monthly labs and have every 2 month provider visits in order to safely give her treatment. She is agreeable to this.   - Repeat PET/CT in 01/2024, sooner if symptomatic of disease progression or increasing trend in tumor markers.   - At time of disease progression would recommend biopsy with NGS to evaluate for ESR1 and PIK3CA mutations as well as consider next line  treatment with abemaciclib and fulvestrant.    2.  Bone metastases/back pain:  She is s/p XRT to the left ilium as well as the lumbar spine and kyphoplasty of L4--there was some extravasation of cement which procedularist is aware of and recommended continued AC indefinitely. Continue MS Contin, as well as robaxin, gabapentin, NSAIDs.   - Back pain has not improved as much as would be expected with degree of cancer response so may be residual from procedure and sclerotic lesions. I think deconditioning and muscle atrophy also play a role. Referral for PT was placed again. Follow-up with palliative care.   - Receiving Zometa once every 3 months.  Will schedule for early November.     3. Depression: Secondary to loss of her son last year and exacerbated by progressive disease this last April.   - She will follow-up with Dr. Tyson and therapist.     4.  Schizoaffective disorder, bipolar type: No change since last visit.  She is on treatment with Zoloft and Seroquel.  Ongoing follow up with psychiatry.     5.  DVT:  No change since last visit.  She continues on Xarelto.    6.  Enlarged thyroid: No change since last visit.  Biopsy on 1/23 c/w thyroiditis.     Greater than 40 minutes was spent with this patient with greater than 20 minutes spent in counseling and coordination of care.    Alee De Los Santos PA-C ([facility])

## 2023-10-05 NOTE — NURSING NOTE
Chief Complaint   Patient presents with    Breast Cancer    Blood Draw     Labs drawn via  by RN. VS taken.     Labs collected from venipuncture by RN. Vitals taken. Checked in for appointment(s).     Evelyn Suarez RN

## 2023-10-19 ENCOUNTER — HOSPITAL ENCOUNTER (EMERGENCY)
Facility: CLINIC | Age: 48
Discharge: HOME | End: 2023-10-20
Attending: EMERGENCY MEDICINE | Admitting: EMERGENCY MEDICINE
Payer: MEDICARE

## 2023-10-19 DIAGNOSIS — R07.9 CHEST PAIN, UNSPECIFIED TYPE: ICD-10-CM

## 2023-10-19 DIAGNOSIS — J18.9 COMMUNITY ACQUIRED PNEUMONIA, UNSPECIFIED LATERALITY: ICD-10-CM

## 2023-10-19 LAB
ALBUMIN SERPL BCG-MCNC: 4.2 G/DL (ref 3.5–5.2)
ALP SERPL-CCNC: 86 U/L (ref 35–104)
ALT SERPL W P-5'-P-CCNC: 12 U/L (ref 0–50)
ANION GAP SERPL CALCULATED.3IONS-SCNC: 10 MMOL/L (ref 7–15)
AST SERPL W P-5'-P-CCNC: 16 U/L (ref 0–45)
BASO+EOS+MONOS # BLD AUTO: ABNORMAL 10*3/UL
BASO+EOS+MONOS NFR BLD AUTO: ABNORMAL %
BASOPHILS # BLD AUTO: ABNORMAL 10*3/UL
BASOPHILS # BLD MANUAL: 0 10E3/UL (ref 0–0.2)
BASOPHILS NFR BLD AUTO: ABNORMAL %
BASOPHILS NFR BLD MANUAL: 1 %
BILIRUB SERPL-MCNC: 0.2 MG/DL
BUN SERPL-MCNC: 7.3 MG/DL (ref 6–20)
CALCIUM SERPL-MCNC: 9.2 MG/DL (ref 8.6–10)
CHLORIDE SERPL-SCNC: 104 MMOL/L (ref 98–107)
CREAT SERPL-MCNC: 0.9 MG/DL (ref 0.51–0.95)
DEPRECATED HCO3 PLAS-SCNC: 26 MMOL/L (ref 22–29)
EGFRCR SERPLBLD CKD-EPI 2021: 78 ML/MIN/1.73M2
EOSINOPHIL # BLD AUTO: ABNORMAL 10*3/UL
EOSINOPHIL # BLD MANUAL: 0 10E3/UL (ref 0–0.7)
EOSINOPHIL NFR BLD AUTO: ABNORMAL %
EOSINOPHIL NFR BLD MANUAL: 0 %
ERYTHROCYTE [DISTWIDTH] IN BLOOD BY AUTOMATED COUNT: 13.4 % (ref 10–15)
GLUCOSE SERPL-MCNC: 113 MG/DL (ref 70–99)
HCT VFR BLD AUTO: 34.4 % (ref 35–47)
HGB BLD-MCNC: 11.6 G/DL (ref 11.7–15.7)
IMM GRANULOCYTES # BLD: ABNORMAL 10*3/UL
IMM GRANULOCYTES NFR BLD: ABNORMAL %
LYMPHOCYTES # BLD AUTO: ABNORMAL 10*3/UL
LYMPHOCYTES # BLD MANUAL: 0.6 10E3/UL (ref 0.8–5.3)
LYMPHOCYTES NFR BLD AUTO: ABNORMAL %
LYMPHOCYTES NFR BLD MANUAL: 28 %
MCH RBC QN AUTO: 34 PG (ref 26.5–33)
MCHC RBC AUTO-ENTMCNC: 33.7 G/DL (ref 31.5–36.5)
MCV RBC AUTO: 101 FL (ref 78–100)
MONOCYTES # BLD AUTO: ABNORMAL 10*3/UL
MONOCYTES # BLD MANUAL: 0.1 10E3/UL (ref 0–1.3)
MONOCYTES NFR BLD AUTO: ABNORMAL %
MONOCYTES NFR BLD MANUAL: 5 %
NEUTROPHILS # BLD AUTO: ABNORMAL 10*3/UL
NEUTROPHILS # BLD MANUAL: 1.5 10E3/UL (ref 1.6–8.3)
NEUTROPHILS NFR BLD AUTO: ABNORMAL %
NEUTROPHILS NFR BLD MANUAL: 66 %
NRBC # BLD AUTO: 0 10E3/UL
NRBC BLD AUTO-RTO: 0 /100
PLAT MORPH BLD: ABNORMAL
PLATELET # BLD AUTO: 135 10E3/UL (ref 150–450)
POTASSIUM SERPL-SCNC: 3.7 MMOL/L (ref 3.4–5.3)
PROT SERPL-MCNC: 7.6 G/DL (ref 6.4–8.3)
RBC # BLD AUTO: 3.41 10E6/UL (ref 3.8–5.2)
RBC MORPH BLD: ABNORMAL
SODIUM SERPL-SCNC: 140 MMOL/L (ref 135–145)
TROPONIN T SERPL HS-MCNC: 6 NG/L
WBC # BLD AUTO: 2.2 10E3/UL (ref 4–11)

## 2023-10-19 PROCEDURE — 80053 COMPREHEN METABOLIC PANEL: CPT | Performed by: EMERGENCY MEDICINE

## 2023-10-19 PROCEDURE — 84484 ASSAY OF TROPONIN QUANT: CPT | Performed by: EMERGENCY MEDICINE

## 2023-10-19 PROCEDURE — 93005 ELECTROCARDIOGRAM TRACING: CPT | Performed by: EMERGENCY MEDICINE

## 2023-10-19 PROCEDURE — 93010 ELECTROCARDIOGRAM REPORT: CPT | Performed by: EMERGENCY MEDICINE

## 2023-10-19 PROCEDURE — 99285 EMERGENCY DEPT VISIT HI MDM: CPT | Mod: 25 | Performed by: EMERGENCY MEDICINE

## 2023-10-19 PROCEDURE — 85610 PROTHROMBIN TIME: CPT | Performed by: EMERGENCY MEDICINE

## 2023-10-19 PROCEDURE — 250N000011 HC RX IP 250 OP 636: Mod: JZ | Performed by: EMERGENCY MEDICINE

## 2023-10-19 PROCEDURE — 96374 THER/PROPH/DIAG INJ IV PUSH: CPT | Performed by: EMERGENCY MEDICINE

## 2023-10-19 PROCEDURE — 85730 THROMBOPLASTIN TIME PARTIAL: CPT | Performed by: EMERGENCY MEDICINE

## 2023-10-19 PROCEDURE — 36415 COLL VENOUS BLD VENIPUNCTURE: CPT | Performed by: EMERGENCY MEDICINE

## 2023-10-19 PROCEDURE — 85027 COMPLETE CBC AUTOMATED: CPT | Performed by: EMERGENCY MEDICINE

## 2023-10-19 PROCEDURE — 96375 TX/PRO/DX INJ NEW DRUG ADDON: CPT | Performed by: EMERGENCY MEDICINE

## 2023-10-19 PROCEDURE — 85007 BL SMEAR W/DIFF WBC COUNT: CPT | Performed by: EMERGENCY MEDICINE

## 2023-10-19 RX ORDER — DIPHENHYDRAMINE HYDROCHLORIDE 50 MG/ML
50 INJECTION INTRAMUSCULAR; INTRAVENOUS ONCE
Status: CANCELLED | OUTPATIENT
Start: 2023-10-20 | End: 2023-10-20

## 2023-10-19 RX ORDER — METHYLPREDNISOLONE SODIUM SUCCINATE 40 MG/ML
40 INJECTION, POWDER, LYOPHILIZED, FOR SOLUTION INTRAMUSCULAR; INTRAVENOUS EVERY 4 HOURS
Status: CANCELLED | OUTPATIENT
Start: 2023-10-19 | End: 2023-10-20

## 2023-10-19 RX ORDER — KETOROLAC TROMETHAMINE 15 MG/ML
15 INJECTION, SOLUTION INTRAMUSCULAR; INTRAVENOUS ONCE
Status: COMPLETED | OUTPATIENT
Start: 2023-10-19 | End: 2023-10-19

## 2023-10-19 RX ORDER — HYDROMORPHONE HYDROCHLORIDE 1 MG/ML
0.5 INJECTION, SOLUTION INTRAMUSCULAR; INTRAVENOUS; SUBCUTANEOUS ONCE
Status: COMPLETED | OUTPATIENT
Start: 2023-10-19 | End: 2023-10-19

## 2023-10-19 RX ORDER — ONDANSETRON 2 MG/ML
4 INJECTION INTRAMUSCULAR; INTRAVENOUS ONCE
Status: COMPLETED | OUTPATIENT
Start: 2023-10-19 | End: 2023-10-19

## 2023-10-19 RX ADMIN — ONDANSETRON 4 MG: 2 INJECTION INTRAMUSCULAR; INTRAVENOUS at 23:36

## 2023-10-19 RX ADMIN — HYDROMORPHONE HYDROCHLORIDE 0.5 MG: 1 INJECTION, SOLUTION INTRAMUSCULAR; INTRAVENOUS; SUBCUTANEOUS at 23:37

## 2023-10-19 RX ADMIN — KETOROLAC TROMETHAMINE 15 MG: 15 INJECTION, SOLUTION INTRAMUSCULAR; INTRAVENOUS at 23:37

## 2023-10-19 ASSESSMENT — ACTIVITIES OF DAILY LIVING (ADL): ADLS_ACUITY_SCORE: 35

## 2023-10-20 ENCOUNTER — APPOINTMENT (OUTPATIENT)
Dept: CT IMAGING | Facility: CLINIC | Age: 48
End: 2023-10-20
Attending: EMERGENCY MEDICINE
Payer: MEDICARE

## 2023-10-20 VITALS
SYSTOLIC BLOOD PRESSURE: 125 MMHG | BODY MASS INDEX: 31.48 KG/M2 | DIASTOLIC BLOOD PRESSURE: 78 MMHG | HEART RATE: 68 BPM | RESPIRATION RATE: 18 BRPM | OXYGEN SATURATION: 98 % | TEMPERATURE: 99.2 F | WEIGHT: 232.4 LBS | HEIGHT: 72 IN

## 2023-10-20 LAB
APTT PPP: 27 SECONDS (ref 22–38)
ATRIAL RATE - MUSE: 66 BPM
C PNEUM DNA SPEC QL NAA+PROBE: NOT DETECTED
DIASTOLIC BLOOD PRESSURE - MUSE: NORMAL MMHG
FLUAV H1 2009 PAND RNA SPEC QL NAA+PROBE: NOT DETECTED
FLUAV H1 RNA SPEC QL NAA+PROBE: NOT DETECTED
FLUAV H3 RNA SPEC QL NAA+PROBE: NOT DETECTED
FLUAV RNA SPEC QL NAA+PROBE: NOT DETECTED
FLUBV RNA SPEC QL NAA+PROBE: NOT DETECTED
HADV DNA SPEC QL NAA+PROBE: NOT DETECTED
HCOV PNL SPEC NAA+PROBE: NOT DETECTED
HMPV RNA SPEC QL NAA+PROBE: NOT DETECTED
HPIV1 RNA SPEC QL NAA+PROBE: NOT DETECTED
HPIV2 RNA SPEC QL NAA+PROBE: NOT DETECTED
HPIV3 RNA SPEC QL NAA+PROBE: NOT DETECTED
HPIV4 RNA SPEC QL NAA+PROBE: NOT DETECTED
INR PPP: 1.1 (ref 0.85–1.15)
INTERPRETATION ECG - MUSE: NORMAL
M PNEUMO DNA SPEC QL NAA+PROBE: NOT DETECTED
P AXIS - MUSE: 60 DEGREES
PR INTERVAL - MUSE: 162 MS
QRS DURATION - MUSE: 80 MS
QT - MUSE: 404 MS
QTC - MUSE: 423 MS
R AXIS - MUSE: 30 DEGREES
RSV RNA SPEC QL NAA+PROBE: NOT DETECTED
RSV RNA SPEC QL NAA+PROBE: NOT DETECTED
RV+EV RNA SPEC QL NAA+PROBE: NOT DETECTED
SARS-COV-2 RNA RESP QL NAA+PROBE: NEGATIVE
SYSTOLIC BLOOD PRESSURE - MUSE: NORMAL MMHG
T AXIS - MUSE: 74 DEGREES
VENTRICULAR RATE- MUSE: 66 BPM

## 2023-10-20 PROCEDURE — 71275 CT ANGIOGRAPHY CHEST: CPT | Mod: MF

## 2023-10-20 PROCEDURE — 87635 SARS-COV-2 COVID-19 AMP PRB: CPT | Performed by: EMERGENCY MEDICINE

## 2023-10-20 PROCEDURE — 250N000009 HC RX 250: Performed by: EMERGENCY MEDICINE

## 2023-10-20 PROCEDURE — 250N000011 HC RX IP 250 OP 636: Mod: JZ | Performed by: EMERGENCY MEDICINE

## 2023-10-20 PROCEDURE — 87581 M.PNEUMON DNA AMP PROBE: CPT | Performed by: EMERGENCY MEDICINE

## 2023-10-20 PROCEDURE — 250N000013 HC RX MED GY IP 250 OP 250 PS 637: Performed by: EMERGENCY MEDICINE

## 2023-10-20 RX ORDER — AMOXICILLIN 500 MG/1
1000 CAPSULE ORAL 3 TIMES DAILY
Qty: 42 CAPSULE | Refills: 0 | Status: SHIPPED | OUTPATIENT
Start: 2023-10-20 | End: 2023-10-27

## 2023-10-20 RX ORDER — IOPAMIDOL 755 MG/ML
100 INJECTION, SOLUTION INTRAVASCULAR ONCE
Status: COMPLETED | OUTPATIENT
Start: 2023-10-20 | End: 2023-10-20

## 2023-10-20 RX ORDER — AZITHROMYCIN 250 MG/1
500 TABLET, FILM COATED ORAL ONCE
Status: COMPLETED | OUTPATIENT
Start: 2023-10-20 | End: 2023-10-20

## 2023-10-20 RX ORDER — AMOXICILLIN 250 MG/1
1000 CAPSULE ORAL ONCE
Status: COMPLETED | OUTPATIENT
Start: 2023-10-20 | End: 2023-10-20

## 2023-10-20 RX ORDER — AZITHROMYCIN 250 MG/1
250 TABLET, FILM COATED ORAL DAILY
Qty: 4 TABLET | Refills: 0 | Status: SHIPPED | OUTPATIENT
Start: 2023-10-21 | End: 2023-10-25

## 2023-10-20 RX ADMIN — SODIUM CHLORIDE 90 ML: 9 INJECTION, SOLUTION INTRAVENOUS at 01:11

## 2023-10-20 RX ADMIN — AMOXICILLIN 1000 MG: 250 CAPSULE ORAL at 02:45

## 2023-10-20 RX ADMIN — AZITHROMYCIN DIHYDRATE 500 MG: 250 TABLET ORAL at 02:45

## 2023-10-20 RX ADMIN — IOPAMIDOL 100 ML: 755 INJECTION, SOLUTION INTRAVENOUS at 01:08

## 2023-10-20 ASSESSMENT — ACTIVITIES OF DAILY LIVING (ADL)
ADLS_ACUITY_SCORE: 37
ADLS_ACUITY_SCORE: 37

## 2023-10-20 NOTE — DISCHARGE INSTRUCTIONS
Take complete course of amoxicillin and azithromycin.  Your next dose of azithromycin is due Saturday morning.  Drink plenty of fluids.  Take acetaminophen as needed for fever and discomfort.  Follow-up with your care team as planned.    Return to the emergency department if worsening symptoms or other concerns.

## 2023-10-20 NOTE — ED PROVIDER NOTES
[facility] EMERGENCY DEPARTMENT ([facility])    10/19/23      ED PROVIDER NOTE      History     Chief Complaint   Patient presents with    Shortness of Breath     Dyspnea especially on right side of the chest. Reports cough, history of PE, pt reports similar symptoms when she had it before.      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  Patient reports newly developing a cough and dyspnea, especially to the right side of her chest.  She does have a history of PE, and states that her current symptoms are similar to this with pain in the chest with coughing. No increased dyspnea. No fevers or chills. No abd pain, nausea or vomiting.     Past Medical History  Past Medical History:   Diagnosis Date    Anxiety     Breast CA (H) 12/2020    Depression     DVT (deep venous thrombosis) (H) 2014    Left breast mass     x approximately 4-5 months    Pulmonary emboli (H)     Pyelonephritis     Schizoaffective disorder (H)     Tobacco use      Past Surgical History:   Procedure Laterality Date    COLONOSCOPY N/A 7/8/2022    Procedure: COLONOSCOPY, WITH POLYPECTOMY;  Surgeon: Ham Cano MD;  Location: [facility] OR    IR LUMBAR KYPHOPLASTY VERTEBRAE  5/17/2021    LAPAROSCOPIC SALPINGO-OOPHORECTOMY Bilateral 8/20/2021    Procedure: BILATERAL SALPINGO-OOPHORECTOMY, LAPAROSCOPIC;  Surgeon: Rory Lopez MD;  Location: [facility] OR    TUBAL LIGATION  1998     exemestane (AROMASIN) 25 MG tablet  gabapentin (NEURONTIN) 300 MG capsule  methocarbamol (ROBAXIN) 500 MG tablet  methylPREDNISolone (MEDROL) 32 MG tablet  morphine (MS CONTIN) 15 MG CR tablet  naloxone (NARCAN) 4 MG/0.1ML nasal spray  nicotine (COMMIT) 2 MG  lozenge  nicotine (NICORETTE) 2 MG gum  ondansetron (ZOFRAN) 8 MG tablet  palbociclib (IBRANCE) 125 MG tablet  pantoprazole (PROTONIX) 40 MG EC tablet  polyethylene glycol (MIRALAX) 17 GM/Dose powder  prochlorperazine (COMPAZINE) 10 MG tablet  QUEtiapine (SEROQUEL) 100 MG tablet  rivaroxaban ANTICOAGULANT (XARELTO) 20 MG TABS tablet  SENNA-docusate sodium (SENNA S) 8.6-50 MG tablet  sertraline (ZOLOFT) 100 MG tablet  sertraline (ZOLOFT) 50 MG tablet  tolnaftate (TINACTIN) 1 % external cream  Vitamin D3 (CHOLECALCIFEROL) 25 mcg (1000 units) tablet      Allergies   Allergen Reactions    Contrast Dye      Pt developed nausea after isovue 370 injection on 6/9/21      Family History  Family History   Problem Relation Age of Onset    Lung Cancer Mother     Lung Cancer Father     Lupus Brother     Kidney Disease Brother     Diabetes Daughter     Asthma Son     Cardiovascular Maternal Aunt     Hypertension Other     Diabetes Other     Deep Vein Thrombosis (DVT) No family hx of      Social History   Social History     Tobacco Use    Smoking status: Some Days     Packs/day: .25     Types: Cigarettes     Start date: 5/13/2011    Smokeless tobacco: Never   Substance Use Topics    Alcohol use: Not Currently     Comment: occ    Drug use: Yes     Types: Marijuana     Comment: occ      Past medical history, past surgical history, medications, allergies, family history, and social history were reviewed with the patient. No additional pertinent items.      A complete review of systems was performed with pertinent positives and negatives noted in the HPI, and all other systems negative.    Physical Exam   BP: 130/87  Pulse: 77  Temp: 99.2  F (37.3  C)  Resp: 18  Height: 182.9 cm (6')  Weight: 105.4 kg (232 lb 6.4 oz)  SpO2: 99 %  Physical Exam  Vitals reviewed.   Constitutional:       Appearance: She is well-developed. She is not toxic-appearing.   HENT:      Head: Normocephalic and atraumatic.   Eyes:      Extraocular Movements:  Extraocular movements intact.      Pupils: Pupils are equal, round, and reactive to light.   Cardiovascular:      Rate and Rhythm: Normal rate and regular rhythm.   Pulmonary:      Effort: Pulmonary effort is normal. No respiratory distress.      Breath sounds: Examination of the right-upper field reveals wheezing. Examination of the left-upper field reveals wheezing. Examination of the right-lower field reveals rhonchi. Examination of the left-lower field reveals rhonchi. Wheezing and rhonchi present.   Chest:      Chest wall: No deformity or tenderness.   Abdominal:      Palpations: Abdomen is soft.      Tenderness: There is no abdominal tenderness.   Musculoskeletal:      Cervical back: Normal range of motion and neck supple.   Skin:     General: Skin is warm and dry.   Neurological:      General: No focal deficit present.      Mental Status: She is alert and oriented to person, place, and time.   Psychiatric:         Mood and Affect: Mood normal.         Behavior: Behavior normal.           ED Course, Procedures, & Data      Procedures            EKG Interpretation:      Interpreted by Darius Dupont MD  Time reviewed: 23:52   Symptoms at time of EKG: chest pain   Rhythm: normal sinus   Rate: normal  Axis: normal  Ectopy: none  Conduction: normal  ST Segments/ T Waves: No ST-T wave changes  Q Waves: none  Comparison to prior: Unchanged    Clinical Impression: normal EKG                 No results found for any visits on 10/19/23.  Medications - No data to display  Labs Ordered and Resulted from Time of ED Arrival to Time of ED Departure - No data to display  No orders to display          Critical care was not performed.     Medical Decision Making  The patient's presentation was of moderate complexity (a chronic illness mild to moderate exacerbation, progression, or side effect of treatment).    The patient's evaluation involved:  review of external note(s) from 1 sources (see separate area of note for  details)  ordering and/or review of 3+ test(s) in this encounter (see separate area of note for details)    The patient's management necessitated moderate risk (prescription drug management including medications given in the ED).    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  Patient is nontoxic appearing in the ED, has reassuring vital signs. She has some wheezing and rhonchi on auscultation of lungs bilaterally. Given her history of PE and recent medication noncompliance with xarelto, she was worked up with PE CT to evaluate for possible PE or other airspace disease or intrathoracic abnormalities that could be contributing to symptoms. Patient signed out pending CT results.     I have reviewed the nursing notes. I have reviewed the findings, diagnosis, plan and need for follow up with the patient.    New Prescriptions    No medications on file       Final diagnoses:   None       Darius Dupont MD  [facility] EMERGENCY DEPARTMENT  10/19/2023     Darius Dupont MD  10/20/23 [id number]

## 2023-10-20 NOTE — ED PROVIDER NOTES
Patient received as sign-out at change of shift.  Please see initial note for complete details.  48 year old female who presented to the ED with dyspnea and cough.  Awaiting PE protocol chest CT  Acute events during this shift:     Results for orders placed or performed during the hospital encounter of 10/19/23   CT Chest Pulmonary Embolism w Contrast     Status: None    Narrative    EXAM: CT CHEST WITH CONTRAST - PULMONARY EMBOLISM PROTOCOL   LOCATION: [facility]  DATE/TIME: 10/20/2023 1:18 AM CDT    INDICATION: History of breast cancer. Right-sided chest pain and dyspnea.  COMPARISON: None available.    TECHNIQUE: CT angiogram chest during arterial phase injection IV contrast. 2D and 3D MIP reconstructions were performed by the CT technologist. Dose reduction techniques were used.   CONTRAST: 72 mL Isovue-370.    FINDINGS:    ANGIOGRAM CHEST: No visualized pulmonary embolus. The thoracic aorta is normal in caliber without dissection.    LUNGS AND PLEURA: A few patchy airspace opacities are present in bilateral lower lobes, left lung more prominent than right.    MEDIASTINUM/AXILLAE: 1 cm right lobe of thyroid nodule. A few nonspecific nonenlarged mediastinal in bilateral hilar lymph nodes. 1.3 x 0.9 cm nodular structure in the upper left breast (series 4 image 75).    CORONARY ARTERY CALCIFICATION: Absent.    MUSCULOSKELETAL: Several sclerotic foci scattered within the skeleton.    UPPER ABDOMEN: Unremarkable.      Impression    IMPRESSION:   1.  A few patchy airspace opacities within bilateral lower lobes, left lung more prominent than right, likely representing pneumonia.  2.  No visualized pulmonary embolus.   3.  1.3 cm nodular opacity in the left breast, possibly representing the known breast cancer.  4.  Several sclerotic foci scattered within the skeleton, highly suggestive of metastases.   Comprehensive metabolic panel     Status: Abnormal   Result Value Ref  Range    Sodium 140 135 - 145 mmol/L    Potassium 3.7 3.4 - 5.3 mmol/L    Carbon Dioxide (CO2) 26 22 - 29 mmol/L    Anion Gap 10 7 - 15 mmol/L    Urea Nitrogen 7.3 6.0 - 20.0 mg/dL    Creatinine 0.90 0.51 - 0.95 mg/dL    GFR Estimate 78 >60 mL/min/1.73m2    Calcium 9.2 8.6 - 10.0 mg/dL    Chloride 104 98 - 107 mmol/L    Glucose 113 (H) 70 - 99 mg/dL    Alkaline Phosphatase 86 35 - 104 U/L    AST 16 0 - 45 U/L    ALT 12 0 - 50 U/L    Protein Total 7.6 6.4 - 8.3 g/dL    Albumin 4.2 3.5 - 5.2 g/dL    Bilirubin Total 0.2 <=1.2 mg/dL   INR     Status: Normal   Result Value Ref Range    INR 1.10 0.85 - 1.15   Partial thromboplastin time     Status: Normal   Result Value Ref Range    aPTT 27 22 - 38 Seconds   Symptomatic COVID-19 Virus (Coronavirus) by PCR Nasopharyngeal     Status: Normal    Specimen: Nasopharyngeal; Swab   Result Value Ref Range    SARS CoV2 PCR Negative Negative    Narrative    Testing was performed using the Xpert Xpress SARS-CoV-2 Assay on the Cepheid Gene-Xpert Instrument Systems. Additional information about this Emergency Use Authorization (EUA) assay can be found via the Lab Guide. This test should be ordered for the detection of SARS-CoV-2 in individuals who meet SARS-CoV-2 clinical and/or epidemiological criteria as well as from individuals without symptoms or other reasons to suspect COVID-19. Test performance for asymptomatic patients has only been established in anterior nasal swab specimens. This test is for in vitro diagnostic use under the FDA EUA for laboratories certified under CLIA to perform high complexity testing. This test has not been FDA cleared or approved. A negative result does not rule out the presence of PCR inhibitors in the specimen or target RNA concentration below the limit of detection for the assay. The possibility of a false negative should be considered if the patient's recent exposure or clinical presentation suggests COVID-19. This test was validated by the  [employer]  [facility]. This laboratory is certified under the Clinical Laboratory Improvement Amendments (CLIA) as qualified to perform high complexity laboratory testing.     Troponin T, High Sensitivity     Status: Normal   Result Value Ref Range    Troponin T, High Sensitivity 6 <=14 ng/L   CBC with platelets and differential     Status: Abnormal   Result Value Ref Range    WBC Count 2.2 (L) 4.0 - 11.0 10e3/uL    RBC Count 3.41 (L) 3.80 - 5.20 10e6/uL    Hemoglobin 11.6 (L) 11.7 - 15.7 g/dL    Hematocrit 34.4 (L) 35.0 - 47.0 %     (H) 78 - 100 fL    MCH 34.0 (H) 26.5 - 33.0 pg    MCHC 33.7 31.5 - 36.5 g/dL    RDW 13.4 10.0 - 15.0 %    Platelet Count 135 (L) 150 - 450 10e3/uL    % Neutrophils      % Lymphocytes      % Monocytes      Mids % (Monos, Eos, Basos)      % Eosinophils      % Basophils      % Immature Granulocytes      NRBCs per 100 WBC 0 <1 /100    Absolute Neutrophils      Absolute Lymphocytes      Absolute Monocytes      Mids Abs (Monos, Eos, Basos)      Absolute Eosinophils      Absolute Basophils      Absolute Immature Granulocytes      Absolute NRBCs 0.0 10e3/uL   Manual Differential     Status: Abnormal   Result Value Ref Range    % Neutrophils 66 %    % Lymphocytes 28 %    % Monocytes 5 %    % Eosinophils 0 %    % Basophils 1 %    Absolute Neutrophils 1.5 (L) 1.6 - 8.3 10e3/uL    Absolute Lymphocytes 0.6 (L) 0.8 - 5.3 10e3/uL    Absolute Monocytes 0.1 0.0 - 1.3 10e3/uL    Absolute Eosinophils 0.0 0.0 - 0.7 10e3/uL    Absolute Basophils 0.0 0.0 - 0.2 10e3/uL    RBC Morphology Confirmed RBC Indices     Platelet Assessment  Automated Count Confirmed. Platelet morphology is normal.     Automated Count Confirmed. Platelet morphology is normal.   EKG 12 lead     Status: None (Preliminary result)   Result Value Ref Range    Systolic Blood Pressure  mmHg    Diastolic Blood Pressure  mmHg    Ventricular Rate 66 BPM    Atrial Rate 66 BPM    NC Interval 162 ms    QRS Duration 80 ms      ms    QTc 423 ms    P Axis 60 degrees    R AXIS 30 degrees    T Axis 74 degrees    Interpretation ECG Sinus rhythm  Normal ECG      CBC with Platelets & Differential     Status: Abnormal    Narrative    The following orders were created for panel order CBC with Platelets & Differential.  Procedure                               Abnormality         Status                     ---------                               -----------         ------                     CBC with platelets and d...[[id number]]  Abnormal            Final result               Manual Differential[[id number]]          Abnormal            Final result                 Please view results for these tests on the individual orders.      No evidence for pulmonary embolism on chest CT.  CT findings of probable pneumonia.  Initiated treatment with amoxicillin and azithromycin.  Prescriptions sent to pharmacy of her choice.  Return precautions provided.     Brodie Terrell MD  10/20/23 [id number]

## 2023-10-21 ENCOUNTER — MYC REFILL (OUTPATIENT)
Dept: ONCOLOGY | Facility: CLINIC | Age: 48
End: 2023-10-21
Payer: MEDICARE

## 2023-10-21 DIAGNOSIS — G89.3 CANCER ASSOCIATED PAIN: ICD-10-CM

## 2023-10-23 RX ORDER — MORPHINE SULFATE 15 MG/1
15 TABLET, FILM COATED, EXTENDED RELEASE ORAL DAILY
Qty: 30 TABLET | Refills: 0 | Status: SHIPPED | OUTPATIENT
Start: 2023-10-23 | End: 2023-12-07

## 2023-10-23 NOTE — CONFIDENTIAL NOTE
Narcotic Refill Request  Medication(s) requested:  MS Contin 15mg tab  Person Requesting Refill: Phyllis  What pain is the medication treating: bone mets, back pain  How is the medication being taken?:1 tab daily     Date of most recent appointment:  10/5/23 w/ Alee  Any No Show Visits: no since last appt  Next appointment:   11/28/23  Last fill date and by whom:  Alee De Los Santos on 4/19/23   Reviewed:  no access    Send to provider: Dr. Jahaira Plunkett, as Alee is out of office today.

## 2023-10-25 ENCOUNTER — MYC MEDICAL ADVICE (OUTPATIENT)
Dept: ONCOLOGY | Facility: CLINIC | Age: 48
End: 2023-10-25
Payer: MEDICARE

## 2023-10-25 NOTE — TELEPHONE ENCOUNTER
Oral Chemotherapy Monitoring Program  Lab Follow Up    Reviewed lab results from 10/19/23.Labs done while pt was in the ER, diagnosed with pneumonia. Placed on azithromycin+amoxicillin.        5/10/2023     7:00 AM 6/26/2023    11:00 AM 7/20/2023     3:00 PM 8/30/2023    11:00 AM 8/30/2023     4:00 PM 10/6/2023     8:00 AM 10/25/2023     4:00 PM   ORAL CHEMOTHERAPY   Assessment Type Refill Monthly Follow up Lab Monitoring Refill Other Chart Review Lab Monitoring   Diagnosis Code Breast Cancer Breast Cancer Breast Cancer Breast Cancer Breast Cancer Breast Cancer Breast Cancer   Providers Dr. Dimitrios Plunkett   [facility]   Is this patient followed by the [facility] OC team?    Yes Yes Yes    Drug Name Ibrance (palbociclib) Ibrance (palbociclib) Ibrance (palbociclib) Ibrance (palbociclib) Ibrance (palbociclib) Ibrance (palbociclib) Ibrance (palbociclib)   Dose 125 mg 125 mg 125 mg 125 mg 125 mg 125 mg 125 mg   Current Schedule Daily Daily Daily Daily Daily Daily Daily   Cycle Details 3 weeks on, 1 week off 3 weeks on, 1 week off 3 weeks on, 1 week off 3 weeks on, 1 week off 3 weeks on, 1 week off 3 weeks on, 1 week off 3 weeks on, 1 week off   Planned next cycle start date       11/2/2023   Doses missed in last 2 weeks  3   3     Adherence Assessment  Non-adherent   Adherent     Adverse Effects  Nausea;Constipation   Nausea;Diarrhea     Nausea  Grade 1   Grade 1     Pharmacist Intervention(nausea)  No   No     Constipation  Grade 1        Pharmacist Intervention(constipation)  No        Diarrhea     Grade 1     Pharmacist Intervention(diarrhea)     Yes     Intervention(s)     OTC recommendation     Any new drug interactions?     No     Is the dose as ordered appropriate for the patient?     Yes     Since the last time we talked, have you been hospitalized or used the emergency room?     No          Labs:  _  Result Component Current Result Ref Range   Sodium 140 (10/19/2023) 135 - 145 mmol/L     _  Result Component Current Result Ref Range   Potassium 3.7 (10/19/2023) 3.4 - 5.3 mmol/L     _  Result Component Current Result Ref Range   Calcium 9.2 (10/19/2023) 8.6 - 10.0 mg/dL     No results found for Mag within last 30 days.     No results found for Phos within last 30 days.     _  Result Component Current Result Ref Range   Albumin 4.2 (10/19/2023) 3.5 - 5.2 g/dL     _  Result Component Current Result Ref Range   Urea Nitrogen 7.3 (10/19/2023) 6.0 - 20.0 mg/dL     _  Result Component Current Result Ref Range   Creatinine 0.90 (10/19/2023) 0.51 - 0.95 mg/dL     _  Result Component Current Result Ref Range   AST 16 (10/19/2023) 0 - 45 U/L     _  Result Component Current Result Ref Range   ALT 12 (10/19/2023) 0 - 50 U/L     _  Result Component Current Result Ref Range   Bilirubin Total 0.2 (10/19/2023) <=1.2 mg/dL     _  Result Component Current Result Ref Range   WBC Count 2.2 (L) (10/19/2023) 4.0 - 11.0 10e3/uL     _  Result Component Current Result Ref Range   Hemoglobin 11.6 (L) (10/19/2023) 11.7 - 15.7 g/dL     _  Result Component Current Result Ref Range   Platelet Count 135 (L) (10/19/2023) 150 - 450 10e3/uL     _  Result Component Current Result Ref Range   Absolute Neutrophils 1.5 (L) (10/19/2023) 1.6 - 8.3 10e3/uL     _  Result Component Current Result Ref Range   Absolute Neutrophils 2.6 (10/5/2023) 1.6 - 8.3 10e3/uL        Assessment & Plan:  Results from CBC and CMP show no concerning abnormalities. Plan to continue Ibrance therapy. No DDI's detected with Ibrance+amox+azith. [facility] message sent to [facility].      updated note: 3:59pm 10/25/23: **I CALLED PT AND LET HER KNOW SHE NEEDS TO HAVE LABS DONE IN HER OFF WEEK OF IBRANCE. I LET HER KNOW SCHEDULING WILL REACH OUT TO SET UP LABS AND WE'LL REVIEW LABS ONCE COMPLETED. PT VERBALIZED UNDERSTANDING. IB message sent to scheduling to reach out.      Follow-Up:  11/21: labs  11/28: appt/labs with Dr. Dimitrios Baldwin PharmD  Hematology/Oncology Clinical Pharmacist  [facility]   [phone]

## 2023-10-27 ENCOUNTER — LAB (OUTPATIENT)
Dept: LAB | Facility: CLINIC | Age: 48
End: 2023-10-27
Attending: INTERNAL MEDICINE
Payer: MEDICARE

## 2023-10-27 DIAGNOSIS — C79.51 CARCINOMA OF LEFT BREAST METASTATIC TO BONE (H): ICD-10-CM

## 2023-10-27 DIAGNOSIS — C50.912 CARCINOMA OF LEFT BREAST METASTATIC TO BONE (H): ICD-10-CM

## 2023-10-27 DIAGNOSIS — Z51.11 ENCOUNTER FOR ANTINEOPLASTIC CHEMOTHERAPY: ICD-10-CM

## 2023-10-27 LAB
ALBUMIN SERPL BCG-MCNC: 4.3 G/DL (ref 3.5–5.2)
ALP SERPL-CCNC: 91 U/L (ref 35–104)
ALT SERPL W P-5'-P-CCNC: 8 U/L (ref 0–50)
ANION GAP SERPL CALCULATED.3IONS-SCNC: 10 MMOL/L (ref 7–15)
AST SERPL W P-5'-P-CCNC: 18 U/L (ref 0–45)
BASOPHILS # BLD AUTO: 0 10E3/UL (ref 0–0.2)
BASOPHILS NFR BLD AUTO: 2 %
BILIRUB SERPL-MCNC: 0.4 MG/DL
BUN SERPL-MCNC: 10.1 MG/DL (ref 6–20)
CALCIUM SERPL-MCNC: 9.3 MG/DL (ref 8.6–10)
CANCER AG15-3 SERPL-ACNC: 53 U/ML
CEA SERPL-MCNC: 3 NG/ML
CHLORIDE SERPL-SCNC: 108 MMOL/L (ref 98–107)
CREAT SERPL-MCNC: 0.96 MG/DL (ref 0.51–0.95)
DEPRECATED HCO3 PLAS-SCNC: 25 MMOL/L (ref 22–29)
EGFRCR SERPLBLD CKD-EPI 2021: 73 ML/MIN/1.73M2
EOSINOPHIL # BLD AUTO: 0 10E3/UL (ref 0–0.7)
EOSINOPHIL NFR BLD AUTO: 1 %
ERYTHROCYTE [DISTWIDTH] IN BLOOD BY AUTOMATED COUNT: 13.7 % (ref 10–15)
GLUCOSE SERPL-MCNC: 111 MG/DL (ref 70–99)
HCT VFR BLD AUTO: 34.5 % (ref 35–47)
HGB BLD-MCNC: 11.8 G/DL (ref 11.7–15.7)
IMM GRANULOCYTES # BLD: 0 10E3/UL
IMM GRANULOCYTES NFR BLD: 0 %
LYMPHOCYTES # BLD AUTO: 1 10E3/UL (ref 0.8–5.3)
LYMPHOCYTES NFR BLD AUTO: 36 %
MCH RBC QN AUTO: 34.3 PG (ref 26.5–33)
MCHC RBC AUTO-ENTMCNC: 34.2 G/DL (ref 31.5–36.5)
MCV RBC AUTO: 100 FL (ref 78–100)
MONOCYTES # BLD AUTO: 0.2 10E3/UL (ref 0–1.3)
MONOCYTES NFR BLD AUTO: 7 %
NEUTROPHILS # BLD AUTO: 1.5 10E3/UL (ref 1.6–8.3)
NEUTROPHILS NFR BLD AUTO: 54 %
NRBC # BLD AUTO: 0 10E3/UL
NRBC BLD AUTO-RTO: 0 /100
PLATELET # BLD AUTO: 175 10E3/UL (ref 150–450)
POTASSIUM SERPL-SCNC: 3.7 MMOL/L (ref 3.4–5.3)
PROT SERPL-MCNC: 7.6 G/DL (ref 6.4–8.3)
RBC # BLD AUTO: 3.44 10E6/UL (ref 3.8–5.2)
SODIUM SERPL-SCNC: 143 MMOL/L (ref 135–145)
WBC # BLD AUTO: 2.7 10E3/UL (ref 4–11)

## 2023-10-27 PROCEDURE — 85025 COMPLETE CBC W/AUTO DIFF WBC: CPT

## 2023-10-27 PROCEDURE — 80053 COMPREHEN METABOLIC PANEL: CPT

## 2023-10-27 PROCEDURE — 36415 COLL VENOUS BLD VENIPUNCTURE: CPT

## 2023-10-27 PROCEDURE — 86300 IMMUNOASSAY TUMOR CA 15-3: CPT

## 2023-10-27 PROCEDURE — 82378 CARCINOEMBRYONIC ANTIGEN: CPT

## 2023-10-27 NOTE — NURSING NOTE
Chief Complaint   Patient presents with    Lab Only     Labs drawn with  by rn.     Labs drawn with  by rn.  Pt tolerated well.      Sabine Cid RN

## 2023-10-30 ENCOUNTER — MYC MEDICAL ADVICE (OUTPATIENT)
Dept: ONCOLOGY | Facility: CLINIC | Age: 48
End: 2023-10-30
Payer: MEDICARE

## 2023-10-30 NOTE — TELEPHONE ENCOUNTER
Oral Chemotherapy Monitoring Program  Lab Follow Up    Reviewed CBC/CMP lab results from 10/27/23.        6/26/2023    11:00 AM 7/20/2023     3:00 PM 8/30/2023    11:00 AM 8/30/2023     4:00 PM 10/6/2023     8:00 AM 10/25/2023     4:00 PM 10/30/2023    12:00 PM   ORAL CHEMOTHERAPY   Assessment Type Monthly Follow up Lab Monitoring Refill Other Chart Review Lab Monitoring Lab Monitoring   Diagnosis Code Breast Cancer Breast Cancer Breast Cancer Breast Cancer Breast Cancer Breast Cancer    Providers Dr. Dimitrios Plunkett    [facility]    Is this patient followed by the [facility] OC team?   Yes Yes Yes     Drug Name Ibrance (palbociclib) Ibrance (palbociclib) Ibrance (palbociclib) Ibrance (palbociclib) Ibrance (palbociclib) Ibrance (palbociclib) Ibrance (palbociclib)   Dose 125 mg 125 mg 125 mg 125 mg 125 mg 125 mg 125 mg   Current Schedule Daily Daily Daily Daily Daily Daily Daily   Cycle Details 3 weeks on, 1 week off 3 weeks on, 1 week off 3 weeks on, 1 week off 3 weeks on, 1 week off 3 weeks on, 1 week off 3 weeks on, 1 week off 3 weeks on, 1 week off   Planned next cycle start date      11/2/2023 11/2/2023   Doses missed in last 2 weeks 3   3      Adherence Assessment Non-adherent   Adherent      Adverse Effects Nausea;Constipation   Nausea;Diarrhea      Nausea Grade 1   Grade 1      Pharmacist Intervention(nausea) No   No      Constipation Grade 1         Pharmacist Intervention(constipation) No         Diarrhea    Grade 1      Pharmacist Intervention(diarrhea)    Yes      Intervention(s)    OTC recommendation      Any new drug interactions?    No      Is the dose as ordered appropriate for the patient?    Yes      Since the last time we talked, have you been hospitalized or used the emergency room?    No          Labs:  _  Result Component Current Result Ref Range   Sodium 143 (10/27/2023) 135 - 145 mmol/L      _  Result Component Current Result Ref Range   Potassium 3.7 (10/27/2023) 3.4 - 5.3 mmol/L     _  Result Component Current Result Ref Range   Calcium 9.3 (10/27/2023) 8.6 - 10.0 mg/dL     No results found for Mag within last 30 days.     No results found for Phos within last 30 days.     _  Result Component Current Result Ref Range   Albumin 4.3 (10/27/2023) 3.5 - 5.2 g/dL     _  Result Component Current Result Ref Range   Urea Nitrogen 10.1 (10/27/2023) 6.0 - 20.0 mg/dL     _  Result Component Current Result Ref Range   Creatinine 0.96 (H) (10/27/2023) 0.51 - 0.95 mg/dL     _  Result Component Current Result Ref Range   AST 18 (10/27/2023) 0 - 45 U/L     _  Result Component Current Result Ref Range   ALT 8 (10/27/2023) 0 - 50 U/L     _  Result Component Current Result Ref Range   Bilirubin Total 0.4 (10/27/2023) <=1.2 mg/dL     _  Result Component Current Result Ref Range   WBC Count 2.7 (L) (10/27/2023) 4.0 - 11.0 10e3/uL     _  Result Component Current Result Ref Range   Hemoglobin 11.8 (10/27/2023) 11.7 - 15.7 g/dL     _  Result Component Current Result Ref Range   Platelet Count 175 (10/27/2023) 150 - 450 10e3/uL     _  Result Component Current Result Ref Range   Absolute Neutrophils 1.5 (L) (10/19/2023) 1.6 - 8.3 10e3/uL     _  Result Component Current Result Ref Range   Absolute Neutrophils 1.5 (L) (10/27/2023) 1.6 - 8.3 10e3/uL        Assessment & Plan:  Results for the CMP and CBC show no concerning abnormalities. Ok to start Ibrance cycle on 11/2.     Sent [employer] message to [facility]    Follow-Up:  11/28: appt/labs with Dr. Plunkett.    Francisca Baldwin PharmD  Hematology/Oncology Clinical Pharmacist  [facility]   [phone]

## 2023-11-02 DIAGNOSIS — Z17.0 MALIGNANT NEOPLASM OF OVERLAPPING SITES OF LEFT BREAST IN FEMALE, ESTROGEN RECEPTOR POSITIVE (H): Primary | ICD-10-CM

## 2023-11-02 DIAGNOSIS — C79.51 CARCINOMA OF LEFT BREAST METASTATIC TO BONE (H): ICD-10-CM

## 2023-11-02 DIAGNOSIS — C50.912 CARCINOMA OF LEFT BREAST METASTATIC TO BONE (H): ICD-10-CM

## 2023-11-02 DIAGNOSIS — C50.812 MALIGNANT NEOPLASM OF OVERLAPPING SITES OF LEFT BREAST IN FEMALE, ESTROGEN RECEPTOR POSITIVE (H): Primary | ICD-10-CM

## 2023-11-02 RX ORDER — HEPARIN SODIUM,PORCINE 10 UNIT/ML
5 VIAL (ML) INTRAVENOUS
Status: CANCELLED | OUTPATIENT
Start: 2023-11-06

## 2023-11-02 RX ORDER — HEPARIN SODIUM (PORCINE) LOCK FLUSH IV SOLN 100 UNIT/ML 100 UNIT/ML
5 SOLUTION INTRAVENOUS
Status: CANCELLED | OUTPATIENT
Start: 2023-11-06

## 2023-11-03 DIAGNOSIS — C50.812 MALIGNANT NEOPLASM OF OVERLAPPING SITES OF LEFT BREAST IN FEMALE, ESTROGEN RECEPTOR POSITIVE (H): Primary | ICD-10-CM

## 2023-11-03 DIAGNOSIS — Z17.0 MALIGNANT NEOPLASM OF OVERLAPPING SITES OF LEFT BREAST IN FEMALE, ESTROGEN RECEPTOR POSITIVE (H): Primary | ICD-10-CM

## 2023-11-03 DIAGNOSIS — C79.51 CARCINOMA OF LEFT BREAST METASTATIC TO BONE (H): ICD-10-CM

## 2023-11-03 DIAGNOSIS — C50.912 CARCINOMA OF LEFT BREAST METASTATIC TO BONE (H): ICD-10-CM

## 2023-11-06 ENCOUNTER — APPOINTMENT (OUTPATIENT)
Dept: LAB | Facility: CLINIC | Age: 48
End: 2023-11-06
Attending: INTERNAL MEDICINE
Payer: MEDICARE

## 2023-11-06 ENCOUNTER — INFUSION THERAPY VISIT (OUTPATIENT)
Dept: ONCOLOGY | Facility: CLINIC | Age: 48
End: 2023-11-06
Attending: INTERNAL MEDICINE
Payer: MEDICARE

## 2023-11-06 VITALS
BODY MASS INDEX: 31.71 KG/M2 | TEMPERATURE: 99.3 F | SYSTOLIC BLOOD PRESSURE: 155 MMHG | DIASTOLIC BLOOD PRESSURE: 89 MMHG | HEART RATE: 75 BPM | RESPIRATION RATE: 16 BRPM | WEIGHT: 233.8 LBS | OXYGEN SATURATION: 99 %

## 2023-11-06 DIAGNOSIS — C79.51 CARCINOMA OF LEFT BREAST METASTATIC TO BONE (H): ICD-10-CM

## 2023-11-06 DIAGNOSIS — Z17.0 MALIGNANT NEOPLASM OF OVERLAPPING SITES OF LEFT BREAST IN FEMALE, ESTROGEN RECEPTOR POSITIVE (H): Primary | ICD-10-CM

## 2023-11-06 DIAGNOSIS — C50.912 CARCINOMA OF LEFT BREAST METASTATIC TO BONE (H): ICD-10-CM

## 2023-11-06 DIAGNOSIS — C50.812 MALIGNANT NEOPLASM OF OVERLAPPING SITES OF LEFT BREAST IN FEMALE, ESTROGEN RECEPTOR POSITIVE (H): Primary | ICD-10-CM

## 2023-11-06 LAB
ALBUMIN SERPL BCG-MCNC: 4.2 G/DL (ref 3.5–5.2)
ALP SERPL-CCNC: 97 U/L (ref 35–104)
ALT SERPL W P-5'-P-CCNC: 11 U/L (ref 0–50)
ANION GAP SERPL CALCULATED.3IONS-SCNC: 12 MMOL/L (ref 7–15)
AST SERPL W P-5'-P-CCNC: 16 U/L (ref 0–45)
BASOPHILS # BLD AUTO: 0 10E3/UL (ref 0–0.2)
BASOPHILS NFR BLD AUTO: 1 %
BILIRUB SERPL-MCNC: 0.4 MG/DL
BUN SERPL-MCNC: 11 MG/DL (ref 6–20)
CALCIUM SERPL-MCNC: 9.4 MG/DL (ref 8.6–10)
CALCIUM SERPL-MCNC: 9.4 MG/DL (ref 8.6–10)
CANCER AG15-3 SERPL-ACNC: 47 U/ML
CEA SERPL-MCNC: 3 NG/ML
CHLORIDE SERPL-SCNC: 106 MMOL/L (ref 98–107)
CREAT SERPL-MCNC: 0.84 MG/DL (ref 0.51–0.95)
DEPRECATED HCO3 PLAS-SCNC: 23 MMOL/L (ref 22–29)
EGFRCR SERPLBLD CKD-EPI 2021: 85 ML/MIN/1.73M2
EOSINOPHIL # BLD AUTO: 0.1 10E3/UL (ref 0–0.7)
EOSINOPHIL NFR BLD AUTO: 2 %
ERYTHROCYTE [DISTWIDTH] IN BLOOD BY AUTOMATED COUNT: 14.4 % (ref 10–15)
GLUCOSE SERPL-MCNC: 151 MG/DL (ref 70–99)
HCT VFR BLD AUTO: 32.6 % (ref 35–47)
HGB BLD-MCNC: 11.4 G/DL (ref 11.7–15.7)
IMM GRANULOCYTES # BLD: 0 10E3/UL
IMM GRANULOCYTES NFR BLD: 0 %
LYMPHOCYTES # BLD AUTO: 1.1 10E3/UL (ref 0.8–5.3)
LYMPHOCYTES NFR BLD AUTO: 33 %
MCH RBC QN AUTO: 34.4 PG (ref 26.5–33)
MCHC RBC AUTO-ENTMCNC: 35 G/DL (ref 31.5–36.5)
MCV RBC AUTO: 99 FL (ref 78–100)
MONOCYTES # BLD AUTO: 0.4 10E3/UL (ref 0–1.3)
MONOCYTES NFR BLD AUTO: 11 %
NEUTROPHILS # BLD AUTO: 1.8 10E3/UL (ref 1.6–8.3)
NEUTROPHILS NFR BLD AUTO: 53 %
NRBC # BLD AUTO: 0 10E3/UL
NRBC BLD AUTO-RTO: 0 /100
PLATELET # BLD AUTO: 192 10E3/UL (ref 150–450)
POTASSIUM SERPL-SCNC: 3.3 MMOL/L (ref 3.4–5.3)
PROT SERPL-MCNC: 7.4 G/DL (ref 6.4–8.3)
RBC # BLD AUTO: 3.31 10E6/UL (ref 3.8–5.2)
SODIUM SERPL-SCNC: 141 MMOL/L (ref 135–145)
WBC # BLD AUTO: 3.4 10E3/UL (ref 4–11)

## 2023-11-06 PROCEDURE — 82378 CARCINOEMBRYONIC ANTIGEN: CPT

## 2023-11-06 PROCEDURE — 85025 COMPLETE CBC W/AUTO DIFF WBC: CPT

## 2023-11-06 PROCEDURE — 86300 IMMUNOASSAY TUMOR CA 15-3: CPT

## 2023-11-06 PROCEDURE — 36415 COLL VENOUS BLD VENIPUNCTURE: CPT | Performed by: INTERNAL MEDICINE

## 2023-11-06 PROCEDURE — 82310 ASSAY OF CALCIUM: CPT | Performed by: INTERNAL MEDICINE

## 2023-11-06 PROCEDURE — 96374 THER/PROPH/DIAG INJ IV PUSH: CPT

## 2023-11-06 PROCEDURE — 250N000011 HC RX IP 250 OP 636: Mod: JZ | Performed by: INTERNAL MEDICINE

## 2023-11-06 PROCEDURE — 80053 COMPREHEN METABOLIC PANEL: CPT

## 2023-11-06 RX ORDER — ZOLEDRONIC ACID 0.04 MG/ML
4 INJECTION, SOLUTION INTRAVENOUS ONCE
Status: COMPLETED | OUTPATIENT
Start: 2023-11-06 | End: 2023-11-06

## 2023-11-06 RX ADMIN — ZOLEDRONIC ACID 4 MG: 0.04 INJECTION, SOLUTION INTRAVENOUS at 15:48

## 2023-11-06 ASSESSMENT — PAIN SCALES - GENERAL: PAINLEVEL: NO PAIN (0)

## 2023-11-06 NOTE — NURSING NOTE
Chief Complaint   Patient presents with    Blood Draw     Vitals, blood drawn and PIV placed by TV, VAT. Pt checked into appt.      FLOR Valdovinos LPN

## 2023-11-06 NOTE — PROGRESS NOTES
Infusion Nursing Note:  Teresa Sanderson presents today for Zometa.    Patient seen by provider today: No   present during visit today: Not Applicable.    Note: pt denies any new issues or conerns today.       Intravenous Access:  Peripheral IV placed.    Treatment Conditions:  Lab Results   Component Value Date     11/06/2023    POTASSIUM 3.3 (L) 11/06/2023    CR 0.84 11/06/2023    NANDO 9.4 11/06/2023    NANDO 9.4 11/06/2023    BILITOTAL 0.4 11/06/2023    ALBUMIN 4.2 11/06/2023    ALT 11 11/06/2023    AST 16 11/06/2023     Msg sent to provider to address low potassium- writer provided written information on dietary sources of potassium. Pt said she will consider starting a multivitamin.     Post Infusion Assessment:  Patient tolerated infusion without incident.  Blood return noted pre and post infusion.  No evidence of extravasations.       Discharge Plan:   Patient and/or family verbalized understanding of discharge instructions and all questions answered.  Patient will return 11/28 for next appointment.   Departure Mode: Ambulatory.      Digna Pretty RN

## 2023-11-20 NOTE — PROGRESS NOTES
Palliative Care Progress Note    Patient Name: Teresa Sanderson  Primary Provider: No Ref-Primary, Physician    Patient ID:   Medical - She has metastatic breast cancer dx 12/2020; widespread bone mets. S/p RT to lumbar spine and RFA/kyphoplasty L4 5/2021.   11/2022 on Ibrance and anastrazole since 1/2021 (with some treatment interruptions). Long discussion about voluntary opioid tapering 11/15/22 palliative visit.      She has schizoaffective disorder (bipolar type); followed by psychiatry at the .     -Currently on treatment with palbociclib and exemestane. PET/CT in 04/2023 showed disease progression in two small bone metastasis in L pelvis, S/p 5 fractions RT to these lesions.   -Some lapses/breaks in treatment due to complicated social situation.    Last Palliative care appointment: Seen by Dr. Diaz 12/16/2022.     Reviewed: Yes, one prescription for #30 tabs of 15mg long-acting morphine in April 2023.  It appears that a refill of this was sent on 10/23, however patient has not picked it up.    Interim History:  Teresa Sanderson is a 48 year old female who is seen today for follow up with Palliative Care via billable video visit.      Reviewed oncology note from 10/5.  Most recent PET scan in July 2023 showed a positive response to treatment.  Planning repeat PET scan 01/2024.  She has ongoing back pain that has not improved as would be expected with the degree of her cancer response, so this is thought to be residual from her kyphoplasty versus sclerotic lesions.    Mostly bothered by cough. Says she was treated with an antibiotic and is still taking mucinex and Nyquil but continues to cough. Coughs during the day but really severe at night. Using humidifier all the time. Some mild rhinorrhea and feels like she's wheezing. States she has a hx of some asthma like symptoms with activity in the past.     Back pain continues to fluctuate. Some days are really good, other days she needs her walker. Taking  MSContin 15mg about 2-3 times per week - not sure how much different her pain is. Will also rotate days where she will take 1000mg Tylenol 3x or ibuprofen 800mg BID. Sometimes uses naproxen. Didn't know what she could take together, so will only take one of her meds each day. Also uses massage and heat.    Mood has been up and down. Knows she needs to reconnect with Psychiatry. Was hoping to be doing better but says she almost gave up after her son  last year. Her daughter is due her first child in about a month, and patient feels she can't be fully present for her daughter right now due to her own mental health.     Social History:  Lives with cousin and son. On SSD. 5 adult children. Housing insecure. Daughter murdered in . Son  Spring 2022. Worked as a  for [employer] before cancer dx, in [place].   No LOYD hx  Social History     Tobacco Use    Smoking status: Some Days     Packs/day: .25     Types: Cigarettes     Start date: 2011    Smokeless tobacco: Never   Substance Use Topics    Alcohol use: Not Currently     Comment: occ    Drug use: Yes     Types: Marijuana     Comment: occ     Family History- Reviewed in Epic.    Medications- Reviewed in Epic.    Past Medical History- Reviewed in [facility].    Past Surgical History- Reviewed in Epic.    Physical Exam:   Constitutional: Alert, pleasant, no apparent distress. Sitting up on bed, rocking back and forth.  Eyes: Sclera non-icteric, no eye discharge.  ENT: No nasal discharge. Ears grossly normal.  Respiratory: Unlabored respirations. Speaking in full sentences.  Musculoskeletal: Extremities appear normal- no gross deformities noted. No edema noted on upper body.   Skin: No suspicious lesions or rashes on visible skin.  Neurologic: Clear speech, no aphasia. No facial droop.  Psychiatric: Mentation appears normal, appropriate attention. Affect normal/bright. Does not appear anxious or depressed.    Key Data Reviewed:  LABS: 23- Cr 0.84,  GFR 85, Albumin 4.2, LFTs wnl. WBC 3.4, Hgb 11.4, Plts 192. CA 15-3 of 47. CEA 3.    IMAGING: CT Chest PE 10/20/23- IMPRESSION:   1.  A few patchy airspace opacities within bilateral lower lobes, left lung more prominent than right, likely representing pneumonia.  2.  No visualized pulmonary embolus.   3.  1.3 cm nodular opacity in the left breast, possibly representing the known breast cancer.  4.  Several sclerotic foci scattered within the skeleton, highly suggestive of metastases.    CT/PET scan 7/27/23- IMPRESSION:   In this patient with a history of left breast cancer currently on palbociclib and exemestane:  1. Findings suggestive of partial metabolic response. There is interval resolution of metabolic uptake in the previously noted sclerotic lesions of the left iliac wing. There remains low level FDG uptake in the 1.3 cm left breast nodule, which remains unchanged in size dating back to 6/9/2022 PET/CT.  2. Redemonstration of kyphoplasty intravasation changes within the IVC. Subtle hypodensities about the intraluminal kyphoplasty material is suspicious for thrombus, but appears overall decreased in conspicuity compared to prior studies.    Impression & Recommendations & Counseling:  Teresa Sanderson is a 48 year old female with history of hx of metastatic breast cancer.    Pain - Ongoing pain, not improved as would be expected with the degree of her cancer response, so this is thought to be residual from her kyphoplasty versus sclerotic lesions. She is inconsistent with taking her medications, including the MSContin. Discussed that this is not really meant to be taken PRN. Suggested trial of Butrans patch, as I think it would be an easier application for her, and she was amenable.  -Will trial butrans 5mcg patch Q7days. Likely will require PA, so we will work on that.  -OK to continue the MSContin until she gets the patch, then should stop it.  -Provided education about OTC meds and that it is safe to take  any of these with the opioids. Also discussed she can take Tylenol and Advil on the same day.  -Continue heat and massage if helpful.    Cough - Persistent. Discussed this is the time of year for multiple infections/viruses. I don't really do primary care, but I did offer her an Rx for an albuterol inhaler as well as Tessalon pearls. Not sure of Rx coverage for these. Safe to take together, but if one is too expensive, I told her she can just tell them to disregard the Rx.    Mood - Continues to struggle at times. I encouraged her to reconnect with Psych. Discussed taking care of herself now so she can be a more active participant in her own life, her children's lives, and her grandchildren's lives.      Follow up: One month    Video-Visit Details  Video Start Time: 9:23Am  Video End Time: 9:55Am    Originating Location (pt. Location): Home     Distant Location (provider location):  Offsite- Personal Home     Platform used for Video Visit: Chad     Total time spent on day of encounter is 53 mins, including reviewing record, review of above studies, above visit with patient, symptomatic discussion as above, including medication adjustments/prescription management, and documentation.     Ayanna Tellez,   Palliative Medicine   AMCOM ID [id number]    Some chart documentation performed using Dragon Voice recognition Software. Although reviewed after completion, some words and grammatical errors may remain.

## 2023-11-21 ENCOUNTER — TELEPHONE (OUTPATIENT)
Dept: PALLIATIVE CARE | Facility: CLINIC | Age: 48
End: 2023-11-21

## 2023-11-21 ENCOUNTER — VIRTUAL VISIT (OUTPATIENT)
Dept: PALLIATIVE CARE | Facility: CLINIC | Age: 48
End: 2023-11-21
Attending: STUDENT IN AN ORGANIZED HEALTH CARE EDUCATION/TRAINING PROGRAM
Payer: MEDICARE

## 2023-11-21 VITALS — WEIGHT: 233 LBS | HEIGHT: 72 IN | BODY MASS INDEX: 31.56 KG/M2

## 2023-11-21 DIAGNOSIS — M54.9 BACK PAIN WITH HISTORY OF SPINAL SURGERY: ICD-10-CM

## 2023-11-21 DIAGNOSIS — R05.2 SUBACUTE COUGH: ICD-10-CM

## 2023-11-21 DIAGNOSIS — R06.02 SHORTNESS OF BREATH: ICD-10-CM

## 2023-11-21 DIAGNOSIS — Z98.890 BACK PAIN WITH HISTORY OF SPINAL SURGERY: ICD-10-CM

## 2023-11-21 DIAGNOSIS — C79.51 CARCINOMA OF LEFT BREAST METASTATIC TO BONE (H): Primary | ICD-10-CM

## 2023-11-21 DIAGNOSIS — C50.912 CARCINOMA OF LEFT BREAST METASTATIC TO BONE (H): Primary | ICD-10-CM

## 2023-11-21 DIAGNOSIS — Z51.5 ENCOUNTER FOR PALLIATIVE CARE: ICD-10-CM

## 2023-11-21 DIAGNOSIS — F25.0 SCHIZOAFFECTIVE DISORDER, BIPOLAR TYPE (H): ICD-10-CM

## 2023-11-21 DIAGNOSIS — G89.3 CANCER ASSOCIATED PAIN: ICD-10-CM

## 2023-11-21 PROCEDURE — 99215 OFFICE O/P EST HI 40 MIN: CPT | Mod: VID | Performed by: STUDENT IN AN ORGANIZED HEALTH CARE EDUCATION/TRAINING PROGRAM

## 2023-11-21 RX ORDER — BENZONATATE 100 MG/1
100 CAPSULE ORAL 3 TIMES DAILY PRN
Qty: 90 CAPSULE | Refills: 1 | Status: SHIPPED | OUTPATIENT
Start: 2023-11-21 | End: 2023-12-07

## 2023-11-21 RX ORDER — BUPRENORPHINE 5 UG/H
1 PATCH TRANSDERMAL
Qty: 4 PATCH | Refills: 0 | Status: SHIPPED | OUTPATIENT
Start: 2023-11-21 | End: 2024-02-06

## 2023-11-21 RX ORDER — ALBUTEROL SULFATE 90 UG/1
2 AEROSOL, METERED RESPIRATORY (INHALATION) EVERY 4 HOURS PRN
Qty: 18 G | Refills: 3 | Status: SHIPPED | OUTPATIENT
Start: 2023-11-21 | End: 2024-04-08

## 2023-11-21 ASSESSMENT — PAIN SCALES - GENERAL: PAINLEVEL: SEVERE PAIN (7)

## 2023-11-21 NOTE — PROGRESS NOTES
"Virtual Visit Details    Type of service:  Video Visit   Video Start Time: {video visit start/end time for provider to select:873630}  Video End Time:{video visit start/end time for provider to select:827958}    Originating Location (pt. Location): {video visit patient location:529472::\"Home\"}  {PROVIDER LOCATION On-site should be selected for visits conducted from your clinic location or adjoining [facility] hospital, academic office, or other nearby [facility] building. Off-site should be selected for all other provider locations, including home:506423}  Distant Location (provider location):  {virtual location provider:343610}  Platform used for Video Visit: {Virtual Visit Platforms:991552::\"EDITD\"}    "

## 2023-11-21 NOTE — PATIENT INSTRUCTIONS
Recommendations:  -It is OK to take Tylenol and Ibuprofen on the same day. You just don't want to mix ibuprofen/advil and aleve/naproxen on the same day.  -We talked about how the long acting morphine really isn't intended to be taken on an as needed basis and should be taken consistently every day. Since it doesn't make much difference in your pain either, I recommend we switch to a different medication in patch form. I sent a prescription for butrans, which is a patch you will apply every 7 days. You will then take the old patch off and put a new one on. You can wear the patch anywhere on your body (does not have to go over the area of pain). Make sure your do rotate the location where you place the patch so the skin has time to breathe.  -I also sent a prescription for a different cough medicine (pill form) and an albuterol inhaler. You can use both of these medicines, but I the cost of one is really high, it's OK to only fill one.  -I would make it a priority to get back on Psychiatry schedule.    Follow up: One month      Reasons to Call    If you are having worsening/uncontrolled symptoms we want you to call!    You or your other physicians make any changes to medications we have prescribed.  -Please call for refills 4-5 days before you will run out of medication.    Important Phone Numbers, including: Refills, scheduling, and general questions     Palliative Care RN: Aisha Shelton - [phone]  *After hours or on weekends. Will connect you with on call MD. [phone]

## 2023-11-21 NOTE — TELEPHONE ENCOUNTER
PRIOR AUTHORIZATION DENIED    Medication: BENZONATATE 100 MG PO CAPS  Insurance Company: [employer] - Phone [phone] Fax [phone]  Denial Date: 11/21/2023  Denial Rational:     Appeal Information:       Patient Notified: No

## 2023-11-21 NOTE — LETTER
11/21/2023       RE: Teresa Sanderson  [address] [address]       Dear Colleague,    Thank you for referring your patient, Teresa Sanderson, [facility]. Please see a copy of my visit note below.    Palliative Care Progress Note    Patient Name: Teresa Sanderson  Primary Provider: No Ref-Primary, Physician    Patient ID:   Medical - She has metastatic breast cancer dx 12/2020; widespread bone mets. S/p RT to lumbar spine and RFA/kyphoplasty L4 5/2021.   11/2022 on Ibrance and anastrazole since 1/2021 (with some treatment interruptions). Long discussion about voluntary opioid tapering 11/15/22 palliative visit.      She has schizoaffective disorder (bipolar type); followed by psychiatry at the .     -Currently on treatment with palbociclib and exemestane. PET/CT in 04/2023 showed disease progression in two small bone metastasis in L pelvis, S/p 5 fractions RT to these lesions.   -Some lapses/breaks in treatment due to complicated social situation.    Last Palliative care appointment: Seen by Dr. Diaz 12/16/2022.     Reviewed: Yes, one prescription for #30 tabs of 15mg long-acting morphine in April 2023.  It appears that a refill of this was sent on 10/23, however patient has not picked it up.    Interim History:  Teresa Sanderson is a 48 year old female who is seen today for follow up with Palliative Care via billable video visit.      Reviewed oncology note from 10/5.  Most recent PET scan in July 2023 showed a positive response to treatment.  Planning repeat PET scan 01/2024.  She has ongoing back pain that has not improved as would be expected with the degree of her cancer response, so this is thought to be residual from her kyphoplasty versus sclerotic lesions.    Mostly bothered by cough. Says she was treated with an antibiotic and is still taking mucinex and Nyquil but continues to  cough. Coughs during the day but really severe at night. Using humidifier all the time. Some mild rhinorrhea and feels like she's wheezing. States she has a hx of some asthma like symptoms with activity in the past.     Back pain continues to fluctuate. Some days are really good, other days she needs her walker. Taking MSContin 15mg about 2-3 times per week - not sure how much different her pain is. Will also rotate days where she will take 1000mg Tylenol 3x or ibuprofen 800mg BID. Sometimes uses naproxen. Didn't know what she could take together, so will only take one of her meds each day. Also uses massage and heat.    Mood has been up and down. Knows she needs to reconnect with Psychiatry. Was hoping to be doing better but says she almost gave up after her son  last year. Her daughter is due her first child in about a month, and patient feels she can't be fully present for her daughter right now due to her own mental health.     Social History:  Lives with cousin and son. On SSD. 5 adult children. Housing insecure. Daughter murdered in . Son  Spring 2022. Worked as a  for [employer] before cancer dx, in [place].   No LOYD hx  Social History     Tobacco Use    Smoking status: Some Days     Packs/day: .25     Types: Cigarettes     Start date: 2011    Smokeless tobacco: Never   Substance Use Topics    Alcohol use: Not Currently     Comment: occ    Drug use: Yes     Types: Marijuana     Comment: occ     Family History- Reviewed in Epic.    Medications- Reviewed in Epic.    Past Medical History- Reviewed in [facility].    Past Surgical History- Reviewed in Epic.    Physical Exam:   Constitutional: Alert, pleasant, no apparent distress. Sitting up on bed, rocking back and forth.  Eyes: Sclera non-icteric, no eye discharge.  ENT: No nasal discharge. Ears grossly normal.  Respiratory: Unlabored respirations. Speaking in full sentences.  Musculoskeletal: Extremities appear normal- no gross deformities  noted. No edema noted on upper body.   Skin: No suspicious lesions or rashes on visible skin.  Neurologic: Clear speech, no aphasia. No facial droop.  Psychiatric: Mentation appears normal, appropriate attention. Affect normal/bright. Does not appear anxious or depressed.    Key Data Reviewed:  LABS: 11/6/23- Cr 0.84, GFR 85, Albumin 4.2, LFTs wnl. WBC 3.4, Hgb 11.4, Plts 192. CA 15-3 of 47. CEA 3.    IMAGING: CT Chest PE 10/20/23- IMPRESSION:   1.  A few patchy airspace opacities within bilateral lower lobes, left lung more prominent than right, likely representing pneumonia.  2.  No visualized pulmonary embolus.   3.  1.3 cm nodular opacity in the left breast, possibly representing the known breast cancer.  4.  Several sclerotic foci scattered within the skeleton, highly suggestive of metastases.    CT/PET scan 7/27/23- IMPRESSION:   In this patient with a history of left breast cancer currently on palbociclib and exemestane:  1. Findings suggestive of partial metabolic response. There is interval resolution of metabolic uptake in the previously noted sclerotic lesions of the left iliac wing. There remains low level FDG uptake in the 1.3 cm left breast nodule, which remains unchanged in size dating back to 6/9/2022 PET/CT.  2. Redemonstration of kyphoplasty intravasation changes within the IVC. Subtle hypodensities about the intraluminal kyphoplasty material is suspicious for thrombus, but appears overall decreased in conspicuity compared to prior studies.    Impression & Recommendations & Counseling:  Teresa Sanderson is a 48 year old female with history of hx of metastatic breast cancer.    Pain - Ongoing pain, not improved as would be expected with the degree of her cancer response, so this is thought to be residual from her kyphoplasty versus sclerotic lesions. She is inconsistent with taking her medications, including the MSContin. Discussed that this is not really meant to be taken PRN. Suggested trial of  Butrans patch, as I think it would be an easier application for her, and she was amenable.  -Will trial butrans 5mcg patch Q7days. Likely will require PA, so we will work on that.  -OK to continue the MSContin until she gets the patch, then should stop it.  -Provided education about OTC meds and that it is safe to take any of these with the opioids. Also discussed she can take Tylenol and Advil on the same day.  -Continue heat and massage if helpful.    Cough - Persistent. Discussed this is the time of year for multiple infections/viruses. I don't really do primary care, but I did offer her an Rx for an albuterol inhaler as well as Tessalon pearls. Not sure of Rx coverage for these. Safe to take together, but if one is too expensive, I told her she can just tell them to disregard the Rx.    Mood - Continues to struggle at times. I encouraged her to reconnect with Psych. Discussed taking care of herself now so she can be a more active participant in her own life, her children's lives, and her grandchildren's lives.      Follow up: One month         Total time spent on day of encounter is 53 mins, including reviewing record, review of above studies, above visit with patient, symptomatic discussion as above, including medication adjustments/prescription management, and documentation.         Some chart documentation performed using Dragon Voice recognition Software. Although reviewed after completion, some words and grammatical errors may remain.          Again, thank you for allowing me to participate in the care of your patient.      Sincerely,    Ayanna Tellez, DO

## 2023-11-21 NOTE — NURSING NOTE
Is the patient currently in the state of [place]? YES    Visit mode:VIDEO    If the visit is dropped, the patient can be reconnected by: VIDEO VISIT: Text to cell phone:   Telephone Information:   Mobile [phone]       Will anyone else be joining the visit? NO  (If patient encounters technical issues they should call [phone]    How would you like to obtain your AVS? MyChart    Are changes needed to the allergy or medication list? No    Reason for visit: RECHECK    Sangeetha CUELLAR

## 2023-11-22 DIAGNOSIS — C50.912 CARCINOMA OF LEFT BREAST METASTATIC TO BONE (H): ICD-10-CM

## 2023-11-22 DIAGNOSIS — C79.51 CARCINOMA OF LEFT BREAST METASTATIC TO BONE (H): ICD-10-CM

## 2023-11-22 DIAGNOSIS — C50.812 MALIGNANT NEOPLASM OF OVERLAPPING SITES OF LEFT BREAST IN FEMALE, ESTROGEN RECEPTOR POSITIVE (H): Primary | ICD-10-CM

## 2023-11-22 DIAGNOSIS — Z17.0 MALIGNANT NEOPLASM OF OVERLAPPING SITES OF LEFT BREAST IN FEMALE, ESTROGEN RECEPTOR POSITIVE (H): Primary | ICD-10-CM

## 2023-11-27 RX ORDER — HEPARIN SODIUM (PORCINE) LOCK FLUSH IV SOLN 100 UNIT/ML 100 UNIT/ML
5 SOLUTION INTRAVENOUS
Status: CANCELLED | OUTPATIENT
Start: 2024-02-04

## 2023-11-27 RX ORDER — HEPARIN SODIUM,PORCINE 10 UNIT/ML
5 VIAL (ML) INTRAVENOUS
Status: CANCELLED | OUTPATIENT
Start: 2024-02-04

## 2023-11-27 RX ORDER — ZOLEDRONIC ACID 0.04 MG/ML
4 INJECTION, SOLUTION INTRAVENOUS ONCE
Status: CANCELLED | OUTPATIENT
Start: 2024-02-04 | End: 2024-02-04

## 2023-11-27 NOTE — PROGRESS NOTES
Oncology Visit:   Date on this visit: Nov 28, 2023    Diagnosis:  ER positive left breast cancer metastasized to bones.     Primary Physician: No Ref-Primary, Physician     History Of Present Illness:    Ms. Sanderson is a 48 year old female with a h/o tobacco abuse and DVTs with left breast cancer metastasized to bone. She presented to [place] ED with back pain on 12/5/2020. MRI of the L-spine showed an abnormal L4 lesion with associated right paraspinal mass, abnormalities in L5 and the left iliac bone were also seen. CT C/A/P showed a left breast mass, lytic lesions of T7, L4, and the pelvis, and a 3 cm lesion in the kidney (thought to be a cyst). Ultrasound of the bilateral lower extremities showed a non-occlusive thrombus in the left popliteal vein. Mammogram and ultrasound of the bilateral breasts on 12/17/2020 showed a spiculated mass measuring at least 7.8 cm at 12-1:00 left breast extending from the nipple to 9 cm from the nipple with associated nipple retraction. This mass was biopsied, and showed IDC with surrounding DCIS, grade 3, ER+ 90%, and IA+ 75%.  HER2 was equivocal in approximately 35% of tumor cells by FISH and was negative by IHC.    Metastatic Breast Cancer Treatment:  12/23/2021 - 1/7/2021  Radiation (3000 cGy) to the lumbar spine.  1/29/2021 - present  Ibrance, zoladex, and anastrozole.  5/17/2021 radiofrequency ablation, kyphoplasty to L4  8/20/2021  Bilateral salpingo-oophorectomies, Ibrance and anastrozole.  She was off anastrozole 12/2021 - 2/2022 and off Ibrance 01/2022 - 02/2022 due to stress and impending homelessness. Off both again 03/2022-04/2022 due to death of her son but restarted in 05/2022.  04/2023  PET/CT with progression in 2 small metastases in the left pelvis.  Palbociclib continued.  Anastrozole changed to exemestane  5/5/2023  Completed radiation, 2000 cGy in 5 fractions, to the left ilium.    Interval History:   Ms. Sanderson comes into clinic today for routine  metastatic breast cancer follow-up.  She states about a month ago she developed a heaviness in her chest, shortness of breath, and cough.  She presented to the emergency department and a CT chest with contrast was done to rule out pulmonary embolus.  She was diagnosed with a left lower lobe pneumonia and was treated with a course of antibiotics.  She states all symptoms have now resolved with the exception of cough.  She experiences cough when she lies down at night.  She states it feels as if there is mucus in her throat that she is unable to bring up.  She has mentioned this to palliative medicine who has given her an inhaler.  She has not yet picked up the inhaler, but plans to do so today.  She has no fevers or chills.  She currently has dyspnea on exertion only.  She has no chest pain.  She denies nausea, abdominal pain, or constipation.  She reports that the lump in her left breast is increasing in size.  There is some tenderness when she pushes on the area.  She can also see the lump in the mirror whereas she previously could not.  In addition, she notices protrusion of an area just inferior to her inferior mammary fold.  This area is also tender when she pushes on it.  She otherwise denies new bone or joint aches or pains.     Past Medical/Surgical History:   Past Medical History:   Diagnosis Date    Anxiety     Breast CA (H) 12/2020    Depression     DVT (deep venous thrombosis) (H) 2014    Left breast mass     x approximately 4-5 months    Pulmonary emboli (H)     Pyelonephritis     Schizoaffective disorder (H)     Tobacco use      Past Surgical History:   Procedure Laterality Date    COLONOSCOPY N/A 7/8/2022    Procedure: COLONOSCOPY, WITH POLYPECTOMY;  Surgeon: Ham Cano MD;  Location: [facility] OR    IR LUMBAR KYPHOPLASTY VERTEBRAE  5/17/2021    LAPAROSCOPIC SALPINGO-OOPHORECTOMY Bilateral 8/20/2021    Procedure: BILATERAL SALPINGO-OOPHORECTOMY, LAPAROSCOPIC;  Surgeon: Rory Lopez MD;   Location: [facility] OR    TUBAL LIGATION  1998        Allergies   Allergen Reactions    Contrast Dye      Pt developed nausea after isovue 370 injection on 6/9/21        Current Outpatient Medications   Medication    albuterol (PROAIR HFA/PROVENTIL HFA/VENTOLIN HFA) 108 (90 Base) MCG/ACT inhaler    benzonatate (TESSALON) 100 MG capsule    buprenorphine (BUTRANS) 5 MCG/HR WK patch    exemestane (AROMASIN) 25 MG tablet    gabapentin (NEURONTIN) 300 MG capsule    methocarbamol (ROBAXIN) 500 MG tablet    methylPREDNISolone (MEDROL) 32 MG tablet    morphine (MS CONTIN) 15 MG CR tablet    naloxone (NARCAN) 4 MG/0.1ML nasal spray    nicotine (COMMIT) 2 MG lozenge    nicotine (NICORETTE) 2 MG gum    ondansetron (ZOFRAN) 8 MG tablet    palbociclib (IBRANCE) 125 MG tablet    pantoprazole (PROTONIX) 40 MG EC tablet    polyethylene glycol (MIRALAX) 17 GM/Dose powder    prochlorperazine (COMPAZINE) 10 MG tablet    QUEtiapine (SEROQUEL) 100 MG tablet    rivaroxaban ANTICOAGULANT (XARELTO) 20 MG TABS tablet    SENNA-docusate sodium (SENNA S) 8.6-50 MG tablet    sertraline (ZOLOFT) 100 MG tablet    sertraline (ZOLOFT) 50 MG tablet    tolnaftate (TINACTIN) 1 % external cream    Vitamin D3 (CHOLECALCIFEROL) 25 mcg (1000 units) tablet     No current facility-administered medications for this visit.   '  Physical Exam:   BP (!) 137/90   Pulse 81   Temp 98.2  F (36.8  C) (Oral)   Resp 16   Wt 106.8 kg (235 lb 6.4 oz)   SpO2 98%   BMI 31.93 kg/m    General:  Well appearing adult female in NAD.  Alert and oriented x 3.  HEENT:  Normocephalic.  Sclera anicteric. Palpable enlarged thyroid on exam.  MMM.  Lymph:  No palpable cervical, supraclavicular, or axillary LAD.   Chest:  CTA bilaterally.  No wheezes or crackles.  CV:  RRR.   Breast:  At 11:30, 7 cm from the nipple of the left breast is a firm mass palpating 1.7 x 3 cm.  Protrusion of this mass is visible without palpation.  There are no other discretely palpable masses in the left  breast.  Left nipple is everted and without discharge.  Abd:  Soft/ND/NT   Musc:  Focally tender to palpation left 5th anterior rib in the area of the inframammary fold.  Ext:  No edema of the bilateral lower extremities.    Neuro:  Cranial nerves grossly intact.  Alert and oriented x 3.  Psych:  Mood and affect appear normal.  She is pleasant and conversant.  Skin:  No visible concerning skin rashes or lesions.    Laboratory/Imaging Studies:   I personally reviewed the below laboratories and images:    11/28/2023 Labs:  Electrolytes are wnl with the exception of chloride elevated at 108.  Glucose elevated at 124  LFTS are wnl.    WBC is low at 3.5; ANC is wnl at 1.8  Hemoglobin is low at 11.3 g/dL  Platelets are wnl at 164     Latest Reference Range & Units 08/01/23 08:41 10/05/23 13:16 10/27/23 14:28 11/06/23 14:36 11/28/23 08:45   Cancer Antigen 15-3 <=25 U/mL 36 (H) 40 (H) 53 (H) 47 (H) 48 (H)   (H): Data is abnormally high   Latest Reference Range & Units 08/01/23 08:41 10/05/23 13:16 10/27/23 14:28 11/06/23 14:36 11/28/23 08:45   CEA ng/mL 2.6 2.8 3.0 3.0 3.0       10/20/2023 CT chest w/ contrast:  FINDINGS:     ANGIOGRAM CHEST: No visualized pulmonary embolus. The thoracic aorta is normal in caliber without dissection.     LUNGS AND PLEURA: A few patchy airspace opacities are present in bilateral lower lobes, left lung more prominent than right.     MEDIASTINUM/AXILLAE: 1 cm right lobe of thyroid nodule. A few nonspecific nonenlarged mediastinal in bilateral hilar lymph nodes. 1.3 x 0.9 cm nodular structure in the upper left breast (series 4 image 75).     CORONARY ARTERY CALCIFICATION: Absent.     MUSCULOSKELETAL: Several sclerotic foci scattered within the skeleton.     UPPER ABDOMEN: Unremarkable.    IMPRESSION:   1.  A few patchy airspace opacities within bilateral lower lobes, left lung more prominent than right, likely representing pneumonia.  2.  No visualized pulmonary embolus.   3.  1.3 cm nodular  opacity in the left breast, possibly representing the known breast cancer.  4.  Several sclerotic foci scattered within the skeleton, highly suggestive of metastases.    ASSESSMENT/PLAN:   48 year old female with history of DVT and ER positive left breast cancer metastasized to bones.    1.  Metastatic breast cancer: She is on treatment with palbociclib and exemestane. PET/CT in 04/2023 showed disease progression in two small bone metastasis in L pelvis. She received radiation to these lesions.  She was continued on palbociclib but anastrozole was changed to exemestane. Follow up PET/CT in 07/2023 showed good response to treatment.  - I reviewed the images of the CT chest performed on 10/20.  The size of the left breast mass on the imaging appears larger than on PET/CT performed in July.  The left breast mass also appears to be larger on clinical exam today.  Will move up planned imaging due to concern for disease progression.  - Repeat PET/CT in 2 weeks (2 weeks out in order to ensure insurance approval).  Of note, if imaging confirms disease progression, would recommend repeat biopsy for NGS to evaluate for ESR1 and PIK3CA mutations.  Continue treatment with palbociclib and exemestane in the meantime.    - Discussed SOC abemaciclib and fulvestrant vs treatment on the OP-1250 clinical trial (OP-1250 in combination with either ribociclib or alpelisib) as next line therapy.  Reviewed the dosing, administration, and potential side effects of both regimens.   I gave her printed information on the phase I OP-1250 trial to review.    - Continue to monitor monthly labs and tumor markers.     2.  Recent pneumonia/residual cough:  - She has completed planned course of antibiotics with improvement in all symptoms but cough  - She will  albuterol inhaler prescribed by pulmonology today.  - She had no relief with tessalon perles    3.  Bone metastases/back pain:  She is s/p XRT to the left ilium as well as the lumbar  spine and kyphoplasty of L4--there was some extravasation of cement which procedularist is aware of and recommended continued AC indefinitely.   - buprenorphine 5 mcg/hr patch, Robaxin, and gabapentin.  Ongoing follow up with palliative medicine.  - She has tenderness to palpation of left rib in area of inframammary fold today, repeat imaging as above.  - Receiving Zometa once every 3 months.  Next due around 2/4/2024.    4.  Schizoaffective disorder, bipolar type: No change since last visit.  She is on treatment with Zoloft and Seroquel.  Ongoing follow up with psychiatry.     5.  DVT:  No change since last visit.  She continues on Xarelto.    6.  Immunosuppressed patient:  She was agreeable to receiving COVID and influenza vaccinations; these were administered today.    7.  Chemotherapy induced leukopenia and anemia:  - Reviewed infectious precautions.  - continue to monitor monthly labs.    8.  Follow Up:  PET/CT and visit with Alee in 2 weeks.  Labs every 4 weeks x 4  Zometa around 2/4/2024  Visit with Alee in 8 weeks.   Visit with me in approximately 16 weeks.    I spent 45 minutes on the date of the encounter doing chart review, review of test results, interpretation of tests, patient visit, and documentation.

## 2023-11-28 ENCOUNTER — APPOINTMENT (OUTPATIENT)
Dept: LAB | Facility: CLINIC | Age: 48
End: 2023-11-28
Attending: INTERNAL MEDICINE
Payer: MEDICARE

## 2023-11-28 ENCOUNTER — ONCOLOGY VISIT (OUTPATIENT)
Dept: ONCOLOGY | Facility: CLINIC | Age: 48
End: 2023-11-28
Attending: INTERNAL MEDICINE
Payer: MEDICARE

## 2023-11-28 VITALS
WEIGHT: 235.4 LBS | RESPIRATION RATE: 16 BRPM | TEMPERATURE: 98.2 F | DIASTOLIC BLOOD PRESSURE: 90 MMHG | BODY MASS INDEX: 31.93 KG/M2 | HEART RATE: 81 BPM | OXYGEN SATURATION: 98 % | SYSTOLIC BLOOD PRESSURE: 137 MMHG

## 2023-11-28 DIAGNOSIS — Z23 COVID-19 VACCINE ADMINISTERED: ICD-10-CM

## 2023-11-28 DIAGNOSIS — C50.912 CARCINOMA OF LEFT BREAST METASTATIC TO BONE (H): Primary | ICD-10-CM

## 2023-11-28 DIAGNOSIS — C50.812 MALIGNANT NEOPLASM OF OVERLAPPING SITES OF LEFT BREAST IN FEMALE, ESTROGEN RECEPTOR POSITIVE (H): ICD-10-CM

## 2023-11-28 DIAGNOSIS — Z23 NEED FOR PROPHYLACTIC VACCINATION AND INOCULATION AGAINST INFLUENZA: ICD-10-CM

## 2023-11-28 DIAGNOSIS — Z17.0 MALIGNANT NEOPLASM OF OVERLAPPING SITES OF LEFT BREAST IN FEMALE, ESTROGEN RECEPTOR POSITIVE (H): ICD-10-CM

## 2023-11-28 DIAGNOSIS — C79.51 CARCINOMA OF LEFT BREAST METASTATIC TO BONE (H): Primary | ICD-10-CM

## 2023-11-28 DIAGNOSIS — R05.2 SUBACUTE COUGH: ICD-10-CM

## 2023-11-28 LAB
ALBUMIN SERPL BCG-MCNC: 4 G/DL (ref 3.5–5.2)
ALP SERPL-CCNC: 99 U/L (ref 40–150)
ALT SERPL W P-5'-P-CCNC: 9 U/L (ref 0–50)
ANION GAP SERPL CALCULATED.3IONS-SCNC: 11 MMOL/L (ref 7–15)
AST SERPL W P-5'-P-CCNC: 14 U/L (ref 0–45)
BASOPHILS # BLD AUTO: 0.1 10E3/UL (ref 0–0.2)
BASOPHILS NFR BLD AUTO: 1 %
BILIRUB SERPL-MCNC: 0.3 MG/DL
BUN SERPL-MCNC: 14.8 MG/DL (ref 6–20)
CALCIUM SERPL-MCNC: 9.2 MG/DL (ref 8.6–10)
CANCER AG15-3 SERPL-ACNC: 48 U/ML
CEA SERPL-MCNC: 3 NG/ML
CHLORIDE SERPL-SCNC: 108 MMOL/L (ref 98–107)
CREAT SERPL-MCNC: 0.94 MG/DL (ref 0.51–0.95)
DEPRECATED HCO3 PLAS-SCNC: 22 MMOL/L (ref 22–29)
EGFRCR SERPLBLD CKD-EPI 2021: 74 ML/MIN/1.73M2
EOSINOPHIL # BLD AUTO: 0.1 10E3/UL (ref 0–0.7)
EOSINOPHIL NFR BLD AUTO: 2 %
ERYTHROCYTE [DISTWIDTH] IN BLOOD BY AUTOMATED COUNT: 14.6 % (ref 10–15)
GLUCOSE SERPL-MCNC: 124 MG/DL (ref 70–99)
HCT VFR BLD AUTO: 32.9 % (ref 35–47)
HGB BLD-MCNC: 11.3 G/DL (ref 11.7–15.7)
IMM GRANULOCYTES # BLD: 0 10E3/UL
IMM GRANULOCYTES NFR BLD: 0 %
LYMPHOCYTES # BLD AUTO: 1.4 10E3/UL (ref 0.8–5.3)
LYMPHOCYTES NFR BLD AUTO: 41 %
MCH RBC QN AUTO: 34.5 PG (ref 26.5–33)
MCHC RBC AUTO-ENTMCNC: 34.3 G/DL (ref 31.5–36.5)
MCV RBC AUTO: 100 FL (ref 78–100)
MONOCYTES # BLD AUTO: 0.2 10E3/UL (ref 0–1.3)
MONOCYTES NFR BLD AUTO: 6 %
NEUTROPHILS # BLD AUTO: 1.8 10E3/UL (ref 1.6–8.3)
NEUTROPHILS NFR BLD AUTO: 50 %
NRBC # BLD AUTO: 0 10E3/UL
NRBC BLD AUTO-RTO: 0 /100
PLATELET # BLD AUTO: 164 10E3/UL (ref 150–450)
POTASSIUM SERPL-SCNC: 3.8 MMOL/L (ref 3.4–5.3)
PROT SERPL-MCNC: 7.5 G/DL (ref 6.4–8.3)
RBC # BLD AUTO: 3.28 10E6/UL (ref 3.8–5.2)
SODIUM SERPL-SCNC: 141 MMOL/L (ref 135–145)
WBC # BLD AUTO: 3.5 10E3/UL (ref 4–11)

## 2023-11-28 PROCEDURE — G0008 ADMIN INFLUENZA VIRUS VAC: HCPCS | Performed by: INTERNAL MEDICINE

## 2023-11-28 PROCEDURE — 86300 IMMUNOASSAY TUMOR CA 15-3: CPT | Performed by: INTERNAL MEDICINE

## 2023-11-28 PROCEDURE — 90480 ADMN SARSCOV2 VAC 1/ONLY CMP: CPT | Performed by: INTERNAL MEDICINE

## 2023-11-28 PROCEDURE — 91320 SARSCV2 VAC 30MCG TRS-SUC IM: CPT | Performed by: INTERNAL MEDICINE

## 2023-11-28 PROCEDURE — G0463 HOSPITAL OUTPT CLINIC VISIT: HCPCS | Mod: 25 | Performed by: INTERNAL MEDICINE

## 2023-11-28 PROCEDURE — 90686 IIV4 VACC NO PRSV 0.5 ML IM: CPT | Performed by: INTERNAL MEDICINE

## 2023-11-28 PROCEDURE — 36415 COLL VENOUS BLD VENIPUNCTURE: CPT | Performed by: INTERNAL MEDICINE

## 2023-11-28 PROCEDURE — 82310 ASSAY OF CALCIUM: CPT | Performed by: INTERNAL MEDICINE

## 2023-11-28 PROCEDURE — 82378 CARCINOEMBRYONIC ANTIGEN: CPT | Performed by: INTERNAL MEDICINE

## 2023-11-28 PROCEDURE — 85025 COMPLETE CBC W/AUTO DIFF WBC: CPT | Performed by: INTERNAL MEDICINE

## 2023-11-28 PROCEDURE — 99215 OFFICE O/P EST HI 40 MIN: CPT | Performed by: INTERNAL MEDICINE

## 2023-11-28 PROCEDURE — 250N000011 HC RX IP 250 OP 636: Performed by: INTERNAL MEDICINE

## 2023-11-28 RX ORDER — DIPHENHYDRAMINE HCL 25 MG
50 CAPSULE ORAL
Status: DISCONTINUED | OUTPATIENT
Start: 2023-11-28 | End: 2023-12-07 | Stop reason: HOSPADM

## 2023-11-28 RX ORDER — ACETAMINOPHEN 500 MG
500-1000 TABLET ORAL EVERY 6 HOURS PRN
COMMUNITY
End: 2024-04-08

## 2023-11-28 RX ORDER — EPINEPHRINE 1 MG/ML
0.3 INJECTION, SOLUTION INTRAMUSCULAR; SUBCUTANEOUS EVERY 5 MIN PRN
Status: DISCONTINUED | OUTPATIENT
Start: 2023-11-28 | End: 2023-12-07 | Stop reason: HOSPADM

## 2023-11-28 RX ORDER — DIPHENHYDRAMINE HYDROCHLORIDE 50 MG/ML
50 INJECTION INTRAMUSCULAR; INTRAVENOUS
Status: DISCONTINUED | OUTPATIENT
Start: 2023-11-28 | End: 2023-12-07 | Stop reason: HOSPADM

## 2023-11-28 RX ADMIN — COVID-19 VACCINE, MRNA 30 MCG: 0.05 INJECTION, SUSPENSION INTRAMUSCULAR at 09:37

## 2023-11-28 RX ADMIN — INFLUENZA A VIRUS A/VICTORIA/4897/2022 IVR-238 (H1N1) ANTIGEN (FORMALDEHYDE INACTIVATED), INFLUENZA A VIRUS A/DARWIN/9/2021 SAN-010 (H3N2) ANTIGEN (FORMALDEHYDE INACTIVATED), INFLUENZA B VIRUS B/PHUKET/3073/2013 ANTIGEN (FORMALDEHYDE INACTIVATED), AND INFLUENZA B VIRUS B/MICHIGAN/01/2021 ANTIGEN (FORMALDEHYDE INACTIVATED) 0.5 ML: 15; 15; 15; 15 INJECTION, SUSPENSION INTRAMUSCULAR at 09:36

## 2023-11-28 ASSESSMENT — PAIN SCALES - GENERAL: PAINLEVEL: SEVERE PAIN (6)

## 2023-11-28 NOTE — NURSING NOTE
Pt received Pfizer Comirnaty covid vaccine into right deltoid muscle and Fluzone influenza vaccine into left deltoid muscle. Pt tolerated vaccines without incident. Vaccine information sheets provided and pt instructed to wait for 15 minutes before leaving to monitor for reaction. Pt verbalized understanding.    Administrations This Visit       COVID-19 mRNA vaccine 12+y (COMIRNATY (PFIZER)) injection 30 mcg       Admin Date  11/28/2023 Action  $Given Dose  30 mcg Route  Intramuscular Documented By  Mili Parekh RN              influenza quadrivalent (PF) vacc (FLUZONE) injection 0.5 mL       Admin Date  11/28/2023 Action  $Given Dose  0.5 mL Route  Intramuscular Documented By  Mili Parekh RN Elaine Schmidt, RN

## 2023-11-28 NOTE — LETTER
11/28/2023         RE: Teresa Sanderson  [address] [address]        Dear Colleague,    Thank you for referring your patient, Teresa Sanderson, to the [facility]. Please see a copy of my visit note below.    Oncology Visit:   Date on this visit: Nov 28, 2023    Diagnosis:  ER positive left breast cancer metastasized to bones.     Primary Physician: No Ref-Primary, Physician     History Of Present Illness:    Ms. Sanderson is a 48 year old female with a h/o tobacco abuse and DVTs with left breast cancer metastasized to bone. She presented to [place] ED with back pain on 12/5/2020. MRI of the L-spine showed an abnormal L4 lesion with associated right paraspinal mass, abnormalities in L5 and the left iliac bone were also seen. CT C/A/P showed a left breast mass, lytic lesions of T7, L4, and the pelvis, and a 3 cm lesion in the kidney (thought to be a cyst). Ultrasound of the bilateral lower extremities showed a non-occlusive thrombus in the left popliteal vein. Mammogram and ultrasound of the bilateral breasts on 12/17/2020 showed a spiculated mass measuring at least 7.8 cm at 12-1:00 left breast extending from the nipple to 9 cm from the nipple with associated nipple retraction. This mass was biopsied, and showed IDC with surrounding DCIS, grade 3, ER+ 90%, and NH+ 75%.  HER2 was equivocal in approximately 35% of tumor cells by FISH and was negative by IHC.    Metastatic Breast Cancer Treatment:  12/23/2021 - 1/7/2021  Radiation (3000 cGy) to the lumbar spine.  1/29/2021 - present  Ibrance, zoladex, and anastrozole.  5/17/2021 radiofrequency ablation, kyphoplasty to L4  8/20/2021  Bilateral salpingo-oophorectomies, Ibrance and anastrozole.  She was off anastrozole 12/2021 - 2/2022 and off Ibrance 01/2022 - 02/2022 due to stress and impending homelessness. Off both again 03/2022-04/2022 due to death of her son but restarted in 05/2022.  04/2023  PET/CT with  progression in 2 small metastases in the left pelvis.  Palbociclib continued.  Anastrozole changed to exemestane  5/5/2023  Completed radiation, 2000 cGy in 5 fractions, to the left ilium.    Interval History:   Ms. Sanderson comes into clinic today for routine metastatic breast cancer follow-up.  She states about a month ago she developed a heaviness in her chest, shortness of breath, and cough.  She presented to the emergency department and a CT chest with contrast was done to rule out pulmonary embolus.  She was diagnosed with a left lower lobe pneumonia and was treated with a course of antibiotics.  She states all symptoms have now resolved with the exception of cough.  She experiences cough when she lies down at night.  She states it feels as if there is mucus in her throat that she is unable to bring up.  She has mentioned this to palliative medicine who has given her an inhaler.  She has not yet picked up the inhaler, but plans to do so today.  She has no fevers or chills.  She currently has dyspnea on exertion only.  She has no chest pain.  She denies nausea, abdominal pain, or constipation.  She reports that the lump in her left breast is increasing in size.  There is some tenderness when she pushes on the area.  She can also see the lump in the mirror whereas she previously could not.  In addition, she notices protrusion of an area just inferior to her inferior mammary fold.  This area is also tender when she pushes on it.  She otherwise denies new bone or joint aches or pains.     Past Medical/Surgical History:   Past Medical History:   Diagnosis Date    Anxiety     Breast CA (H) 12/2020    Depression     DVT (deep venous thrombosis) (H) 2014    Left breast mass     x approximately 4-5 months    Pulmonary emboli (H)     Pyelonephritis     Schizoaffective disorder (H)     Tobacco use      Past Surgical History:   Procedure Laterality Date    COLONOSCOPY N/A 7/8/2022    Procedure: COLONOSCOPY, WITH  POLYPECTOMY;  Surgeon: Ham Cano MD;  Location: [facility] OR    IR LUMBAR KYPHOPLASTY VERTEBRAE  5/17/2021    LAPAROSCOPIC SALPINGO-OOPHORECTOMY Bilateral 8/20/2021    Procedure: BILATERAL SALPINGO-OOPHORECTOMY, LAPAROSCOPIC;  Surgeon: Rory Lopez MD;  Location: [facility] OR    TUBAL LIGATION  1998        Allergies   Allergen Reactions    Contrast Dye      Pt developed nausea after isovue 370 injection on 6/9/21        Current Outpatient Medications   Medication    albuterol (PROAIR HFA/PROVENTIL HFA/VENTOLIN HFA) 108 (90 Base) MCG/ACT inhaler    benzonatate (TESSALON) 100 MG capsule    buprenorphine (BUTRANS) 5 MCG/HR WK patch    exemestane (AROMASIN) 25 MG tablet    gabapentin (NEURONTIN) 300 MG capsule    methocarbamol (ROBAXIN) 500 MG tablet    methylPREDNISolone (MEDROL) 32 MG tablet    morphine (MS CONTIN) 15 MG CR tablet    naloxone (NARCAN) 4 MG/0.1ML nasal spray    nicotine (COMMIT) 2 MG lozenge    nicotine (NICORETTE) 2 MG gum    ondansetron (ZOFRAN) 8 MG tablet    palbociclib (IBRANCE) 125 MG tablet    pantoprazole (PROTONIX) 40 MG EC tablet    polyethylene glycol (MIRALAX) 17 GM/Dose powder    prochlorperazine (COMPAZINE) 10 MG tablet    QUEtiapine (SEROQUEL) 100 MG tablet    rivaroxaban ANTICOAGULANT (XARELTO) 20 MG TABS tablet    SENNA-docusate sodium (SENNA S) 8.6-50 MG tablet    sertraline (ZOLOFT) 100 MG tablet    sertraline (ZOLOFT) 50 MG tablet    tolnaftate (TINACTIN) 1 % external cream    Vitamin D3 (CHOLECALCIFEROL) 25 mcg (1000 units) tablet     No current facility-administered medications for this visit.   '  Physical Exam:   BP (!) 137/90   Pulse 81   Temp 98.2  F (36.8  C) (Oral)   Resp 16   Wt 106.8 kg (235 lb 6.4 oz)   SpO2 98%   BMI 31.93 kg/m    General:  Well appearing adult female in NAD.  Alert and oriented x 3.  HEENT:  Normocephalic.  Sclera anicteric. Palpable enlarged thyroid on exam.  MMM.  Lymph:  No palpable cervical, supraclavicular, or axillary LAD.   Chest:   CTA bilaterally.  No wheezes or crackles.  CV:  RRR.   Breast:  At 11:30, 7 cm from the nipple of the left breast is a firm mass palpating 1.7 x 3 cm.  Protrusion of this mass is visible without palpation.  There are no other discretely palpable masses in the left breast.  Left nipple is everted and without discharge.  Abd:  Soft/ND/NT   Musc:  Focally tender to palpation left 5th anterior rib in the area of the inframammary fold.  Ext:  No edema of the bilateral lower extremities.    Neuro:  Cranial nerves grossly intact.  Alert and oriented x 3.  Psych:  Mood and affect appear normal.  She is pleasant and conversant.  Skin:  No visible concerning skin rashes or lesions.    Laboratory/Imaging Studies:   I personally reviewed the below laboratories and images:    11/28/2023 Labs:  Electrolytes are wnl with the exception of chloride elevated at 108.  Glucose elevated at 124  LFTS are wnl.    WBC is low at 3.5; ANC is wnl at 1.8  Hemoglobin is low at 11.3 g/dL  Platelets are wnl at 164     Latest Reference Range & Units 08/01/23 08:41 10/05/23 13:16 10/27/23 14:28 11/06/23 14:36 11/28/23 08:45   Cancer Antigen 15-3 <=25 U/mL 36 (H) 40 (H) 53 (H) 47 (H) 48 (H)   (H): Data is abnormally high   Latest Reference Range & Units 08/01/23 08:41 10/05/23 13:16 10/27/23 14:28 11/06/23 14:36 11/28/23 08:45   CEA ng/mL 2.6 2.8 3.0 3.0 3.0       10/20/2023 CT chest w/ contrast:  FINDINGS:     ANGIOGRAM CHEST: No visualized pulmonary embolus. The thoracic aorta is normal in caliber without dissection.     LUNGS AND PLEURA: A few patchy airspace opacities are present in bilateral lower lobes, left lung more prominent than right.     MEDIASTINUM/AXILLAE: 1 cm right lobe of thyroid nodule. A few nonspecific nonenlarged mediastinal in bilateral hilar lymph nodes. 1.3 x 0.9 cm nodular structure in the upper left breast (series 4 image 75).     CORONARY ARTERY CALCIFICATION: Absent.     MUSCULOSKELETAL: Several sclerotic foci scattered  within the skeleton.     UPPER ABDOMEN: Unremarkable.    IMPRESSION:   1.  A few patchy airspace opacities within bilateral lower lobes, left lung more prominent than right, likely representing pneumonia.  2.  No visualized pulmonary embolus.   3.  1.3 cm nodular opacity in the left breast, possibly representing the known breast cancer.  4.  Several sclerotic foci scattered within the skeleton, highly suggestive of metastases.    ASSESSMENT/PLAN:   48 year old female with history of DVT and ER positive left breast cancer metastasized to bones.    1.  Metastatic breast cancer: She is on treatment with palbociclib and exemestane. PET/CT in 04/2023 showed disease progression in two small bone metastasis in L pelvis. She received radiation to these lesions.  She was continued on palbociclib but anastrozole was changed to exemestane. Follow up PET/CT in 07/2023 showed good response to treatment.  - I reviewed the images of the CT chest performed on 10/20.  The size of the left breast mass on the imaging appears larger than on PET/CT performed in July.  The left breast mass also appears to be larger on clinical exam today.  Will move up planned imaging due to concern for disease progression.  - Repeat PET/CT in 2 weeks (2 weeks out in order to ensure insurance approval).  Of note, if imaging confirms disease progression, would recommend repeat biopsy for NGS to evaluate for ESR1 and PIK3CA mutations.  Continue treatment with palbociclib and exemestane in the meantime.    - Discussed SOC abemaciclib and fulvestrant vs treatment on the OP-1250 clinical trial (OP-1250 in combination with either ribociclib or alpelisib) as next line therapy.  Reviewed the dosing, administration, and potential side effects of both regimens.   I gave her printed information on the phase I OP-1250 trial to review.    - Continue to monitor monthly labs and tumor markers.     2.  Recent pneumonia/residual cough:  - She has completed planned course  of antibiotics with improvement in all symptoms but cough  - She will  albuterol inhaler prescribed by pulmonology today.  - She had no relief with tessalon perles    3.  Bone metastases/back pain:  She is s/p XRT to the left ilium as well as the lumbar spine and kyphoplasty of L4--there was some extravasation of cement which procedularist is aware of and recommended continued AC indefinitely.   - buprenorphine 5 mcg/hr patch, Robaxin, and gabapentin.  Ongoing follow up with palliative medicine.  - She has tenderness to palpation of left rib in area of inframammary fold today, repeat imaging as above.  - Receiving Zometa once every 3 months.  Next due around 2/4/2024.    4.  Schizoaffective disorder, bipolar type: No change since last visit.  She is on treatment with Zoloft and Seroquel.  Ongoing follow up with psychiatry.     5.  DVT:  No change since last visit.  She continues on Xarelto.    6.  Immunosuppressed patient:  She was agreeable to receiving COVID and influenza vaccinations; these were administered today.    7.  Chemotherapy induced leukopenia and anemia:  - Reviewed infectious precautions.  - continue to monitor monthly labs.    8.  Follow Up:  PET/CT and visit with Alee in 2 weeks.  Labs every 4 weeks x 4  Zometa around 2/4/2024  Visit with Alee in 8 weeks.   Visit with me in approximately 16 weeks.    I spent 45 minutes on the date of the encounter doing chart review, review of test results, interpretation of tests, patient visit, and documentation.       Jahaira Plunkett MD

## 2023-11-28 NOTE — NURSING NOTE
Oncology Rooming Note    November 28, 2023 8:54 AM   Teresa Sanderson is a 48 year old female who presents for:    Chief Complaint   Patient presents with    Blood Draw     Labs drawn via  by RN in lab.  VS taken    Breast Cancer     Initial Vitals: BP (!) 137/90   Pulse 81   Temp 98.2  F (36.8  C) (Oral)   Resp 16   Wt 106.8 kg (235 lb 6.4 oz)   SpO2 98%   BMI 31.93 kg/m   Estimated body mass index is 31.93 kg/m  as calculated from the following:    Height as of 11/21/23: 1.829 m (6').    Weight as of this encounter: 106.8 kg (235 lb 6.4 oz). Body surface area is 2.33 meters squared.  Severe Pain (6) Comment: Data Unavailable   No LMP recorded. (Menstrual status: Tubal ).  Allergies reviewed: Yes  Medications reviewed: Yes    Medications: Medication refills not needed today.  Pharmacy name entered into [facility]:    [id number] - [place], [place] - [address] AT [facility]CHAR[facility] - [place] - [address] [facility]  [id number] - [place] - [address] [place] [address] [address] AT [id number] - [place], [place] - [address] [place] [address] BLVD AT [address] [place] [address] & [facility] - [place], [place] - [address] [place] [address]    Clinical concerns:        Vee Bal CMA

## 2023-12-04 NOTE — PROGRESS NOTES
[facility]  TRANSFER of CARE DIAGNOSTIC ASSESSMENT       Virtual Visit Details    Type of service:  Video Visit   Video Start Time:  2:33 PM  Video End Time: 3:43 PM    Originating Location (pt. Location): Home    Distant Location (provider location):  On-site  Platform used for Video Visit: [facility] TEAM:    PCP- Physician No Ref-Primary  Therapist- None    Teresa is a 48 year old who uses the pronouns she, her, hers.      Chief Concern     Transfer of care diagnostic assessment, medication management     Diagnoses     Schizoaffective disorder, bipolar type (previously diagnosed with bipolar disorder), MRE depressed, in partial remission to mild,      with current psychotic features    Bereavement  Metastatic breast cancer     Assessment     Teresa was seen today for a transfer of care diagnostic assessment and ongoing medication management. Issues discussed are included below:    - Depression/Anxiety: Per her report, there has been a marked increase in both anxious and depressive symptoms over the past several months in the context of her health concerns, particularly her cancer diagnosis and the uncertain prognosis at this time. Related to this, she has been experiencing sleep difficulties, increased anxiety, feeling overwhelmed and a low mood. She has a hard time getting to sleep and is only able to sleep for short periods of time. She has felt isolated in being able to talk about these concerns, as she does not want to cause stress to her loved ones. Discussed the option of re-staring psychotherapy and Teresa was in favor of this as a starting point. Teresa also noted that she had resumed taking sertraline from an old prescription, but had only been taking the 100mg dose. Plan to continue at this dose for now, but may consider and increase in the future if symptoms persist.    - Schizoaffective Disorder: Has continued  taking her Seroquel for sleep and symptom management per her report, but also notes ongoing vague perception of hearing voices and noticing that she talks with herself at times. On review, this appears to indicate breakthrough symptoms of psychosis, which have in turn further exacerbated her anxiety/distress. Given good results with Seroquel in the past, will trial an increase in quetiapine to 350mg in addition to an extra 25mg PRN dose that can be taken if she wakes up during the night and is unable to fall back asleep.     Future Considerations:  - Consider switching Seroquel to alternative SGA if no improvement with dose optimization  - Increase in sertraline    Psychotropic Drug Interactions:  [PSYCHCLINICDDI]  ADDITIVE SEDATION: Butrans, Robaxin, Seroquel  Management: Routine Monitoring, refer for MTM  for evaluation for DDI with full array of medications    MNPMP was not checked today:  writer not prescribing controlled substances at this time    Risk Statements:   Treatment Risk- Risks, benefits, alternatives and potential adverse effects have been discussed and are understood.   Safety Risk-Cleaster did not appear to be an imminent safety risk to self or others.     Plan     1) Medications:   - Increase Seroquel to 350mg at bedtime plus 25mg PRN overnight for sleep  - Continue sertraline 100mg daily      Prescribed by Heme Onc/Other Providers:  - Butrans 5mcg/hr wk patch  - Gabapentin 600 mg in the morning, 600 mg at 2 pm and 600 mg at bedtime  - Robaxin 500 mg TID PRN for muscle spasms  - Rivaroxaban 20mg tablet daily    2) Psychotherapy: Writer to contact prior therapist about re-establishing vs referral to new provider for individual therapy    3) Next due:  Labs- SGA monitoring labs due in 01/2024  EKG- Routine monitoring is not indicated for current psychotropic medication regimen   Rating scales- AIMS: due at next in-person appointment    4) Referrals: MTM- for review of full regimen of medications  "given ongoing cancer diagnosis and complexities in treatment    5) Other: none    6) Follow-up: Return to clinic in 4 weeks     Pertinent Background                                                   [most recent eval 23]   Medical: Diagnosed with L breast cancer in Dec 2020 after presenting with a few mos of back pain. Mets to L4, L5, left iliac bone as well as paraspinal mass. 2021 started Ibrance, zoladex, and anastrozole; 21- s/p radiofrequency ablation, keloplasty/ segmental plasty of L4; 2021- showing complete response to treatment.  Psych: Lifetime history of \"rapid mood shifts\". S/P 7-8 hospitalizations, all in [place] except the 1st at [facility] in . Seroquel started at that time, took 100mg TID x yrs. Details in eval from said period. Hospitalization [id number] is discussed in eval and documents what sounds like a full manic episode. CA dx'd 2020. Zoloft started after the cancer dx. Seroquel 100mg was stopped 2021 (not helping), doxepin and Ambien tried for sleep-neither helped. Son  2022 by accidental overdose.   Meds: Seroquel x years 100mg-300mg was helpful but 300mg too sedating & became  less effective over time; Invega for a short period; Zyprexa was helpful; no Haldol or Risperdal; Lithium did not help (? 2 yrs ago) same for Depakote; trazodone; no HOWELL     Pertinent Items Include: suicidal ideation, psychosis, tom, mutiple   psychotropic trials, trauma hx, violent behavior, psych hosps, cocaine . Last hosp  2021.     Subjective     Biggest stressors for her right now are her medical issues, has a PET scan upcoming to assess the status of her cancer and to see if it has spread. Her best friend is coming into town to go with her to several upcoming appointments as a support.    Seroquel only helping with sleep partially, sometimes sleeps OK, but other times will wake up at 3 or 4 AM and struggle to go back to sleep.    Mood feels like it oscillates between " "up and down, with a \"snap of the fingers\" will go from up to down.    Low energy, which keeps her from \"enjoying life\". Feels like she has to \"fake it\" when she is in scenarios that she would normally enjoy. Tries to put on a good face when at events with others so she doesn't \"spoil it\" for them.     Still grieving for her son who  last year as well.    No therapist currently, but is interested in re-establishing with a therapist    Rocks \"all the time\" and feels that she isn't able to manage it. Becomes markedly more pronounced when she is feeling anxious and stressed.    No SI/HI or other safety concerns at this time.    Notices that she has been talking to herself more often lately. Sometimes thinks she is talking to another person. When noticing psychosis in the past had an increase in impulsivity and \"shooting off at the mouth\", feelings of anger. Sometimes catches herself noticing voices that others don't hear, not sure what they are saying though.    Most recently has been getting refills from her oncology provider. Restarted taking sertraline using leftover prescription starting last couple of months.      Recent Psych Symptoms:   Depression:  depressed mood, low energy, insomnia, feeling hopeless, excessive crying, overwhelmed, and mood dysregulation  Elevated:  none  Psychosis:  auditory hallucinations  Anxiety:  feeling fearful and nervous/overwhelmed  Trauma Related:   Medical trauma/stressors, anxiety increased when thinking about medical procedures and results  Insomnia:  Yes: intermittent difficulty with both initiation and maintenance due to worries at night  Other:  No    Current Social History:  Financial/occupational: on disability for finances currently  Living situation (partner, children, pets, etc): Rents an apartment, son has a room with her but doesn't stay there often, 1 cat (\"Toejoe\")  Social/spiritual support: Prayer is important, best friend in [place], sister in [place], son  Feels " "safe at home: Yes    Pertinent Substance Use:   Alcohol: Rarely, maybe once every other month   Cannabis: Infrequently - 1-2x per month for refractory pain  Tobacco: Yes: about 6-7 (often doesn't finish them)  Caffeine:  Yes: about 4-5 cans of coke per day, 1 cup of coffee in morning   Opioids: Yes: prescribed   Narcan Kit current: Yes  Other substances: none    Medical Review of Systems:   Lightheadedness/orthostasis: None  Headaches: Very rare tension headaches  GI: none  Sexual health concerns: None    A comprehensive review of systems was performed and is negative other than noted above.    Contraception: \"tubes are tied\"     Mental Status Exam     Alertness: alert  and oriented  Appearance: adequately groomed  Behavior/Demeanor: cooperative, pleasant, and calm, with good  eye contact   Speech: normal and regular rate and rhythm  Language: intact and no problems  Psychomotor:  Persistent rocking while seated  Mood: depressed and anxious  Affect: restricted and anxious ; congruent to: mood- yes, content- yes  Thought Process/Associations: unremarkable  Thought Content:  Reports none;  Denies suicidal & violent ideation and delusions and paranoid ideation  Perception:  Reports auditory hallucinations without commands [details in history];  Denies visual hallucinations  Insight: fair - open to review and discussion of symptoms across diagnosis, but some difficulty with specific identification of symptoms potentially related to schizoaffective disorder  Judgment: fair  Cognition: does  appear grossly intact; formal cognitive testing was not done  Gait and Station: N/A (telehealth)     Social History                                 pt reported     Financial: See above for current;    Living situation: See above for current;    Children: 5 adult children and 6 grandchildren.   Social/spiritual support: See above for current;    Early Life: mom  when pt was 7yo, chaotic childhood and h/o trauma. Has lived between " [place] and [place] over the years.  Legal: None     Family Mental Health History                                 pt reported      Multiple relatives with schizophrenia      Past Psychiatric History     Self injurious behavior [method, most recent]: No  Suicide attempt [#, most recent, method]: No  Suicidal ideation hx [passive, active]: Yes: previous hx of SI with intermittent plan without intent    Violence/Aggression Hx:Yes per chart review   Psychosis Hx: Yes: AH without commands   Eating Disorder Hx: No  Trauma hx: Yes    Psych Hosp [#, most recent]: Previous multiple psychiatric hospitalizations in [place] for depression. Most recent at [facility] 11/26/21 to 11/30/21   Commitment: No   TMS/ECT: No     SUBSTANCE USE HISTORY   Cocaine use 2019, per chart review       Past Psych Med Trials      Medication Max Dose (mg) Dates / Duration Helpful? DC Reason / Adverse Effects?   Seroquel 300mg  yes    Zoloft 450mg  yes    Gabapentin 600mg TID  ?    Olanzapine 5mg BID      Depakote   no    lithium   no    Invega    Only brief trial   Ambien          Vitals   There were no vitals taken for this visit.  Pulse Readings from Last 3 Encounters:   11/28/23 81   11/06/23 75   10/20/23 68     Wt Readings from Last 3 Encounters:   11/28/23 106.8 kg (235 lb 6.4 oz)   11/21/23 105.7 kg (233 lb)   11/06/23 106.1 kg (233 lb 12.8 oz)     BP Readings from Last 3 Encounters:   11/28/23 (!) 137/90   11/06/23 (!) 155/89   10/20/23 125/78        Medical History     ALLERGIES: Contrast dye    Patient Active Problem List   Diagnosis    Breast mass    Spine metastasis    Urinary tract infection with hematuria    Malignant neoplasm of overlapping sites of left breast in female, estrogen receptor positive (H)    Carcinoma of left breast metastatic to bone (H)    Metastasis to bone (H)    Pain of right lower leg    Malignant neoplasm of female breast, unspecified estrogen receptor status, unspecified laterality, unspecified site of breast (H)     Schizoaffective disorder, bipolar type (H)    Insomnia, unspecified type        Medications     Current Outpatient Medications   Medication Sig Dispense Refill    acetaminophen (TYLENOL) 500 MG tablet Take 500-1,000 mg by mouth every 6 hours as needed for mild pain      albuterol (PROAIR HFA/PROVENTIL HFA/VENTOLIN HFA) 108 (90 Base) MCG/ACT inhaler Inhale 2 puffs into the lungs every 4 hours as needed for shortness of breath, wheezing or cough (Patient not taking: Reported on 11/28/2023) 18 g 3    benzonatate (TESSALON) 100 MG capsule Take 1 capsule (100 mg) by mouth 3 times daily as needed for cough (Patient not taking: Reported on 11/28/2023) 90 capsule 1    buprenorphine (BUTRANS) 5 MCG/HR WK patch Place 1 patch onto the skin every 7 days 4 patch 0    exemestane (AROMASIN) 25 MG tablet Take 1 tablet (25 mg) by mouth daily 90 tablet 3    gabapentin (NEURONTIN) 300 MG capsule Take 2 capsules (600 mg) by mouth every morning AND 2 capsules (600 mg) daily at 2 pm AND 2 capsules (600 mg) At Bedtime. Do all this for 30 days. 180 capsule 2    methocarbamol (ROBAXIN) 500 MG tablet Take 1 tablet (500 mg) by mouth 3 times daily as needed for muscle spasms 30 tablet 0    methylPREDNISolone (MEDROL) 32 MG tablet Take 1 tablet (32 mg) by mouth daily 12 hours prior to the procedure with IV contrast (Patient not taking: Reported on 8/1/2023) 2 tablet 0    morphine (MS CONTIN) 15 MG CR tablet Take 1 tablet (15 mg) by mouth daily (Patient not taking: Reported on 11/28/2023) 30 tablet 0    naloxone (NARCAN) 4 MG/0.1ML nasal spray Spray 1 spray (4 mg) into one nostril alternating nostrils once as needed for opioid reversal every 2-3 minutes until assistance arrives (Patient not taking: Reported on 11/28/2023) 0.2 mL 0    nicotine (COMMIT) 2 MG lozenge Place 1 lozenge (2 mg) inside cheek every hour as needed for smoking cessation (Patient not taking: Reported on 8/1/2023) 27 lozenge 3    nicotine (NICORETTE) 2 MG gum Place 1 each (2  mg) inside cheek every hour as needed for smoking cessation (Patient not taking: Reported on 8/1/2023) 90 each 1    ondansetron (ZOFRAN) 8 MG tablet Take 1 tablet (8 mg) by mouth every 8 hours as needed for nausea 30 tablet 3    palbociclib (IBRANCE) 125 MG tablet Take 1 tablet (125 mg) by mouth daily Take for 21 days, then 7 days off. 21 tablet 2    pantoprazole (PROTONIX) 40 MG EC tablet Take 1 tablet (40 mg) by mouth every morning (before breakfast) 30 tablet 1    polyethylene glycol (MIRALAX) 17 GM/Dose powder Take 17 g by mouth daily as needed for constipation 578 g 3    prochlorperazine (COMPAZINE) 10 MG tablet Take 1 tablet (10 mg) by mouth every 6 hours as needed for nausea or vomiting 30 tablet 3    QUEtiapine (SEROQUEL) 100 MG tablet Take 3 tablets (300 mg) by mouth at bedtime 90 tablet 1    rivaroxaban ANTICOAGULANT (XARELTO) 20 MG TABS tablet Take 1 tablet (20 mg) by mouth daily (with dinner) 90 tablet 3    SENNA-docusate sodium (SENNA S) 8.6-50 MG tablet Take 2 tablets by mouth 2 times daily as needed (Constipation) (Patient not taking: Reported on 8/1/2023) 100 tablet 3    sertraline (ZOLOFT) 100 MG tablet Take one tab (100mg) by mouth every morning. Take in addition to 50 mg tablet for total dose of 150mg daily 30 tablet 1    sertraline (ZOLOFT) 50 MG tablet Take 1 tablet (50 mg) by mouth daily Take in addition to 100mg tablet for total dose of 150mg daily 30 tablet 1    tolnaftate (TINACTIN) 1 % external cream Apply topically 2 times daily (Patient not taking: Reported on 8/1/2023) 30 g 1    Vitamin D3 (CHOLECALCIFEROL) 25 mcg (1000 units) tablet Take 1 tablet (25 mcg) by mouth daily 90 tablet 3        Labs and Data         7/6/2022    12:56 PM 10/17/2022    10:42 AM   PROMIS-10 Total Score w/o Sub Scores   PROMIS TOTAL - SUBSCORES 12 9          No data to display                  10/4/2022    10:02 AM 12/5/2022     8:47 AM 4/10/2023    10:10 AM   PHQ-9 SCORE   [facility]  22 (Severe  depression) 19 (Moderately severe depression)   PHQ-9 Total Score 20 22 19         7/6/2022    12:54 PM 4/10/2023    10:07 AM   JENNIFFER-7 SCORE   Total Score 13 (moderate anxiety) 21 (severe anxiety)   Total Score 13 21       Liver/Kidney Function, TSH Metabolic Blood counts   Recent Labs   Lab Test 11/28/23  0845 11/06/23  1436   AST 14 16   ALT 9 11   ALKPHOS 99 97   CR 0.94 0.84     Recent Labs   Lab Test 03/16/23  1650   TSH 0.80    Recent Labs   Lab Test 01/05/23  1301   CHOL 150   TRIG 114   LDL 69   HDL 58     Recent Labs   Lab Test 01/05/23  1301   A1C 6.4*     Recent Labs   Lab Test 11/28/23  0845   *    Recent Labs   Lab Test 11/28/23  0845   WBC 3.5*   HGB 11.3*   HCT 32.9*                 ECG 10/19/23 QTc = 423ms    PROVIDER: Boris Taylor MD    Level of Medical Decision Making:   - At least 1 chronic problem that is not stable  - Engaged in prescription drug management during visit (discussed any medication benefits, side effects, alternatives, etc.)         Psychiatry Individual Psychotherapy Note   Psychotherapy start time - 1441  Psychotherapy end time - 1500  Date treatment plan last reviewed with patient - 12/04/23  Subjective: This supportive psychotherapy session addressed issues related to goals of therapy and current psychosocial stressors. Patient's reaction: Preparatory in the context of mental status appropriate for ambulatory setting.    Interactive complexity indicated? No  Plan: RTC in timeframe noted above  Psychotherapy services during this visit included myself and the patient.   Treatment Plan      SYMPTOMS; PROBLEMS   MEASURABLE GOALS;    FUNCTIONAL IMPROVEMENT / GAINS INTERVENTIONS DISCHARGE CRITERIA   Depression: depressed mood, low energy, feeling hopelesss, and excessive crying  Anxiety: excessive worry, feeling fearful, and nervous/overwhelmed  Psychosis: auditory hallucinations   reduce depressive symptoms, find enjoyment at least once a day, learn best  practices for sleep, reduce panic attacks/ excessive worry, and identify coping strategies for AH Supportive / psychodynamic marked symptom improvement, reduced visit frequency, and can transfer back to primary care       Patient staffed in clinic with Dr. Woodruff who will sign the note.  Supervisor is Dr. Tyson.

## 2023-12-08 ENCOUNTER — VIRTUAL VISIT (OUTPATIENT)
Dept: PSYCHIATRY | Facility: CLINIC | Age: 48
End: 2023-12-08
Attending: PSYCHIATRY & NEUROLOGY
Payer: MEDICARE

## 2023-12-08 ENCOUNTER — PATIENT OUTREACH (OUTPATIENT)
Dept: ONCOLOGY | Facility: CLINIC | Age: 48
End: 2023-12-08
Payer: MEDICARE

## 2023-12-08 DIAGNOSIS — F25.0 SCHIZOAFFECTIVE DISORDER, BIPOLAR TYPE (H): ICD-10-CM

## 2023-12-08 PROCEDURE — 99214 OFFICE O/P EST MOD 30 MIN: CPT | Mod: VID

## 2023-12-08 PROCEDURE — 90833 PSYTX W PT W E/M 30 MIN: CPT | Mod: VID

## 2023-12-08 RX ORDER — SERTRALINE HYDROCHLORIDE 100 MG/1
100 TABLET, FILM COATED ORAL DAILY
Qty: 30 TABLET | Refills: 2 | Status: SHIPPED | OUTPATIENT
Start: 2023-12-08 | End: 2023-12-11

## 2023-12-08 RX ORDER — QUETIAPINE FUMARATE 50 MG/1
50 TABLET, FILM COATED ORAL AT BEDTIME
Qty: 45 TABLET | Refills: 2 | Status: SHIPPED | OUTPATIENT
Start: 2023-12-08 | End: 2024-02-09

## 2023-12-08 RX ORDER — QUETIAPINE FUMARATE 300 MG/1
300 TABLET, FILM COATED ORAL AT BEDTIME
Qty: 30 TABLET | Refills: 2 | Status: SHIPPED | OUTPATIENT
Start: 2023-12-08 | End: 2024-02-09

## 2023-12-08 ASSESSMENT — ANXIETY QUESTIONNAIRES
6. BECOMING EASILY ANNOYED OR IRRITABLE: NEARLY EVERY DAY
GAD7 TOTAL SCORE: 20
IF YOU CHECKED OFF ANY PROBLEMS ON THIS QUESTIONNAIRE, HOW DIFFICULT HAVE THESE PROBLEMS MADE IT FOR YOU TO DO YOUR WORK, TAKE CARE OF THINGS AT HOME, OR GET ALONG WITH OTHER PEOPLE: VERY DIFFICULT
7. FEELING AFRAID AS IF SOMETHING AWFUL MIGHT HAPPEN: NEARLY EVERY DAY
2. NOT BEING ABLE TO STOP OR CONTROL WORRYING: NEARLY EVERY DAY
3. WORRYING TOO MUCH ABOUT DIFFERENT THINGS: NEARLY EVERY DAY
8. IF YOU CHECKED OFF ANY PROBLEMS, HOW DIFFICULT HAVE THESE MADE IT FOR YOU TO DO YOUR WORK, TAKE CARE OF THINGS AT HOME, OR GET ALONG WITH OTHER PEOPLE?: VERY DIFFICULT
GAD7 TOTAL SCORE: 20
4. TROUBLE RELAXING: NEARLY EVERY DAY
GAD7 TOTAL SCORE: 20
1. FEELING NERVOUS, ANXIOUS, OR ON EDGE: NEARLY EVERY DAY
7. FEELING AFRAID AS IF SOMETHING AWFUL MIGHT HAPPEN: NEARLY EVERY DAY
5. BEING SO RESTLESS THAT IT IS HARD TO SIT STILL: MORE THAN HALF THE DAYS

## 2023-12-08 ASSESSMENT — PAIN SCALES - GENERAL: PAINLEVEL: SEVERE PAIN (6)

## 2023-12-08 ASSESSMENT — PATIENT HEALTH QUESTIONNAIRE - PHQ9
10. IF YOU CHECKED OFF ANY PROBLEMS, HOW DIFFICULT HAVE THESE PROBLEMS MADE IT FOR YOU TO DO YOUR WORK, TAKE CARE OF THINGS AT HOME, OR GET ALONG WITH OTHER PEOPLE: SOMEWHAT DIFFICULT
SUM OF ALL RESPONSES TO PHQ QUESTIONS 1-9: 11
SUM OF ALL RESPONSES TO PHQ QUESTIONS 1-9: 11

## 2023-12-08 NOTE — PROGRESS NOTES
[facility]: Cancer Care                                                                                          Pt sent a [employer] message to confirm that the appt with Alee is for going over the results of the PET scan. Confirmed that was the plan. She has a friend who is trying to get here to help support her as she goes through this right now. Pt really worried about what is going to happen and how her health is. Encouraged her until she gets more information like by having to PET scan and gets the results to really try to stay in the present as much as she can, let the emotions come in and move on through as she works on staying in today. Pt acknowledged this and said the psychiatrist said the exact same thing when she just spoke to them. Pt going to work on doing this. NO other question. Pt verbalized understanding of the information    Signature:  Myriam Ramirez RNCC

## 2023-12-08 NOTE — PATIENT INSTRUCTIONS
**For crisis resources, please see the information at the end of this document**   Patient Education    Thank you for coming to the [facility] [place] [facility].     Lab Testing:  If you had lab testing today and your results are reassuring or normal they will be mailed to you or sent through AfterYes within 7 days. If the lab tests need quick action we will call you with the results. The phone number we will call with results is # [phone]. If this is not the best number please call our clinic and change the number.     Medication Refills:  If you need any refills please call your pharmacy and they will contact us. Our fax number for refills is [phone].   Three business days of notice are needed for general medication refill requests.   Five business days of notice are needed for controlled substance refill requests.   If you need to change to a different pharmacy, please contact the new pharmacy directly. The new pharmacy will help you get your medications transferred.     Contact Us:  Please call [phone] during business hours (8-5:00 M-F).   If you have medication related questions after clinic hours, or on the weekend, please call [phone].     Financial Assistance [phone]   Medical Records [phone]       MENTAL HEALTH CRISIS RESOURCES:  For a emergency help, please call 911 or go to the nearest Emergency Department.     Emergency Walk-In Options:   [facility] Unit @ [facility] ([place]): [phone] - Specialized mental health emergency area designed to be calming  [facility] ([place]): [phone]  [facility] ([place]): [phone]  [facility]): [phone]    [facility] Crisis Information:   [place]: [phone]  [place]: [phone]  [place] ([place]) - Adult: [phone]     Child: [phone]  [place] - Adult: [phone]     Child: [phone]  [place]:  [phone]  List of all [facility] resources:   https://[place].gov/dhs/people-we-serve/adults/health-care/mental-health/resources/crisis-contacts.jsp    National Crisis Information:   Crisis Text Line: Text  [place]  to 961954  Suicide & Crisis Lifeline: 988  National Suicide Prevention Lifeline: 5-913-493-TALK (1-237.144.4651)       For online chat options, visit https://suicidepreventionlifeline.org/chat/  Poison Control Center: 8-697-939-6124  Trans Lifeline: 4-896-244-6129 - Hotline for transgender people of all ages  The [employer]: 2-087-307-5810 - Hotline for LGBT youth     For Non-Emergency Support:   Fast Tracker: Mental Health & Substance Use Disorder Resources -   https://www.[employer].org/

## 2023-12-08 NOTE — NURSING NOTE
Is the patient currently in the state of [place]? YES    Visit mode:VIDEO    If the visit is dropped, the patient can be reconnected by: VIDEO VISIT: Text to cell phone:   Telephone Information:   Mobile [phone]       Will anyone else be joining the visit? NO  (If patient encounters technical issues they should call [phone])    How would you like to obtain your AVS? MyChart    Are changes needed to the allergy or medication list? No    Reason for visit: RECHECK    Tara CUELLAR

## 2023-12-11 ENCOUNTER — HOSPITAL ENCOUNTER (OUTPATIENT)
Dept: PET IMAGING | Facility: CLINIC | Age: 48
Discharge: HOME | End: 2023-12-11
Attending: INTERNAL MEDICINE | Admitting: INTERNAL MEDICINE
Payer: MEDICARE

## 2023-12-11 DIAGNOSIS — C79.51 CARCINOMA OF LEFT BREAST METASTATIC TO BONE (H): ICD-10-CM

## 2023-12-11 DIAGNOSIS — F25.0 SCHIZOAFFECTIVE DISORDER, BIPOLAR TYPE (H): ICD-10-CM

## 2023-12-11 DIAGNOSIS — Z17.0 MALIGNANT NEOPLASM OF OVERLAPPING SITES OF LEFT BREAST IN FEMALE, ESTROGEN RECEPTOR POSITIVE (H): ICD-10-CM

## 2023-12-11 DIAGNOSIS — C50.812 MALIGNANT NEOPLASM OF OVERLAPPING SITES OF LEFT BREAST IN FEMALE, ESTROGEN RECEPTOR POSITIVE (H): ICD-10-CM

## 2023-12-11 DIAGNOSIS — C50.912 CARCINOMA OF LEFT BREAST METASTATIC TO BONE (H): ICD-10-CM

## 2023-12-11 PROCEDURE — G1010 CDSM STANSON: HCPCS | Performed by: STUDENT IN AN ORGANIZED HEALTH CARE EDUCATION/TRAINING PROGRAM

## 2023-12-11 PROCEDURE — A9552 F18 FDG: HCPCS | Performed by: INTERNAL MEDICINE

## 2023-12-11 PROCEDURE — G1010 CDSM STANSON: HCPCS | Mod: PS

## 2023-12-11 PROCEDURE — 343N000001 HC RX 343: Performed by: INTERNAL MEDICINE

## 2023-12-11 PROCEDURE — 78816 PET IMAGE W/CT FULL BODY: CPT | Mod: 26 | Performed by: STUDENT IN AN ORGANIZED HEALTH CARE EDUCATION/TRAINING PROGRAM

## 2023-12-11 RX ORDER — SERTRALINE HYDROCHLORIDE 100 MG/1
100 TABLET, FILM COATED ORAL DAILY
Qty: 90 TABLET | Refills: 0 | Status: SHIPPED | OUTPATIENT
Start: 2023-12-11 | End: 2024-02-09

## 2023-12-11 RX ADMIN — FLUDEOXYGLUCOSE F-18 14.04 MILLICURIE: 500 INJECTION, SOLUTION INTRAVENOUS at 14:26

## 2023-12-11 NOTE — PROGRESS NOTES
Oncology Visit:   Date on this visit: Dec 12, 2023    Diagnosis:  ER positive left breast cancer metastasized to bones.     Primary Physician: No Ref-Primary, Physician     History Of Present Illness:    Ms. Sanderson is a 48 year old female with a h/o tobacco abuse and DVTs with left breast cancer metastasized to bone. She presented to [place] ED with back pain on 12/5/2020. MRI of the L-spine showed an abnormal L4 lesion with associated right paraspinal mass, abnormalities in L5 and the left iliac bone were also seen. CT C/A/P showed a left breast mass, lytic lesions of T7, L4, and the pelvis, and a 3 cm lesion in the kidney (thought to be a cyst). Ultrasound of the bilateral lower extremities showed a non-occlusive thrombus in the left popliteal vein. Mammogram and ultrasound of the bilateral breasts on 12/17/2020 showed a spiculated mass measuring at least 7.8 cm at 12-1:00 left breast extending from the nipple to 9 cm from the nipple with associated nipple retraction. This mass was biopsied, and showed IDC with surrounding DCIS, grade 3, ER+ 90%, and MS+ 75%.  HER2 was equivocal in approximately 35% of tumor cells by FISH and was negative by IHC.    Metastatic Breast Cancer Treatment:  12/23/2021 - 1/7/2021  Radiation (3000 cGy) to the lumbar spine.  1/29/2021 - present  Ibrance, zoladex, and anastrozole.  5/17/2021 radiofrequency ablation, kyphoplasty to L4  8/20/2021  Bilateral salpingo-oophorectomies, Ibrance and anastrozole.  She was off anastrozole 12/2021 - 2/2022 and off Ibrance 01/2022 - 02/2022 due to stress and impending homelessness. Off both again 03/2022-04/2022 due to death of her son but restarted in 05/2022.  04/2023  PET/CT with progression in 2 small metastases in the left pelvis.  Palbociclib continued.  Anastrozole changed to exemestane  5/5/2023  Completed radiation, 2000 cGy in 5 fractions, to the left ilium.    Interval History:   Ms. Sanderson comes into clinic today for routine  "metastatic breast cancer follow-up. She is joined by her son and her friend and sister join via [employer]. She has been really anxious about the results of the PET scan. In general she has been doing okay. Has waxing and waning \"panty liner\" or L groin pain. Most of her pain continues to be centered in low back and is worse in the morning. No mid back pain. She continues to be fairly sedentary.     She is eating and drinking okay. No recent nausea. Bowels and bladder have been moving well. No fevers/chills. Continues to have an intermittent cough at night but this is generally better. She uses mucinex PRN.        Past Medical/Surgical History:   Past Medical History:   Diagnosis Date    Anxiety     Breast CA (H) 12/2020    Depression     DVT (deep venous thrombosis) (H) 2014    Left breast mass     x approximately 4-5 months    Pulmonary emboli (H)     Pyelonephritis     Schizoaffective disorder (H)     Tobacco use      Past Surgical History:   Procedure Laterality Date    COLONOSCOPY N/A 7/8/2022    Procedure: COLONOSCOPY, WITH POLYPECTOMY;  Surgeon: Ham Cano MD;  [place] [facility] [place]    [place] LUMBAR KYPHOPLASTY VERTEBRAE  5/17/2021    LAPAROSCOPIC SALPINGO-OOPHORECTOMY Bilateral 8/20/2021    Procedure: BILATERAL SALPINGO-OOPHORECTOMY, LAPAROSCOPIC;  Surgeon: Rory Lopez MD;  [place] [facility] [place]    TUBAL LIGATION  1998        Allergies   Allergen Reactions    Contrast Dye      Pt developed nausea after isovue 370 injection on 6/9/21        Current Outpatient Medications   Medication    gabapentin (NEURONTIN) 300 MG capsule    methocarbamol (ROBAXIN) 500 MG tablet    rivaroxaban ANTICOAGULANT (XARELTO) 20 MG TABS tablet    acetaminophen (TYLENOL) 500 MG tablet    albuterol (PROAIR HFA/PROVENTIL HFA/VENTOLIN HFA) 108 (90 Base) MCG/ACT inhaler    buprenorphine (BUTRANS) 5 MCG/HR WK patch    exemestane (AROMASIN) 25 MG tablet    methylPREDNISolone (MEDROL) 32 MG tablet    naloxone (NARCAN) 4 MG/0.1ML nasal " spray    ondansetron (ZOFRAN) 8 MG tablet    palbociclib (IBRANCE) 125 MG tablet    pantoprazole (PROTONIX) 40 MG EC tablet    polyethylene glycol (MIRALAX) 17 GM/Dose powder    prochlorperazine (COMPAZINE) 10 MG tablet    QUEtiapine (SEROQUEL) 300 MG tablet    QUEtiapine (SEROQUEL) 50 MG tablet    SENNA-docusate sodium (SENNA S) 8.6-50 MG tablet    sertraline (ZOLOFT) 100 MG tablet    Vitamin D3 (CHOLECALCIFEROL) 25 mcg (1000 units) tablet     No current facility-administered medications for this visit.   '  Physical Exam:   BP (!) 156/107 (BP Location: Left arm, Patient Position: Sitting, Cuff Size: Adult Large)   Pulse 80   Temp 97.8  F (36.6  C) (Oral)   Resp 18   Wt 107.1 kg (236 lb 1.6 oz)   SpO2 97%   BMI 32.02 kg/m    Wt Readings from Last 4 Encounters:   12/12/23 107.1 kg (236 lb 1.6 oz)   11/28/23 106.8 kg (235 lb 6.4 oz)   11/21/23 105.7 kg (233 lb)   11/06/23 106.1 kg (233 lb 12.8 oz)   General:  Well appearing adult female in NAD.  Alert and oriented x 3.  HEENT:  Normocephalic.  Sclera anicteric. Palpable enlarged thyroid on exam.  MMM.  Lymph:  No palpable cervical, supraclavicular, or axillary LAD.   Chest:  CTA bilaterally.  No wheezes or crackles.  CV:  RRR.   Abd:  Soft/ND/NT +BS   Ext:  No edema of the bilateral lower extremities.    Neuro:  Cranial nerves grossly intact.  Alert and oriented x 3.  Psych:  Mood and affect appear normal.  She is pleasant and conversant.  Skin:  No visible concerning skin rashes or lesions.    Laboratory/Imaging Studies:   I personally reviewed the below laboratories and images:    PET 12/11/23  IMPRESSION:         1. Progressive disease when compared to PET/CT 7/27/2023 as evidenced  by:  - New moderately hypermetabolic 0.8 cm medial left breast lesion  adjacent to the persistent mildly hypermetabolic biopsy-proven left  breast lesion, concerning for additional site of breast cancer.  - New hypermetabolic left T7 vertebral body and increased  hypermetabolic  foci within the bony pelvis, concerning for progression  of osseous metastases.     2. Mild hypermetabolic focus in the left thyroid gland, favored to be  inflammatory in setting of prior FNA result of benign lymphocytic  Hashimoto thyroiditis on 1/23/23.      3. Stable L4 vertebroplasty with cement extravasation into the IVC.        ASSESSMENT/PLAN:   48 year old female with history of DVT and ER positive left breast cancer metastasized to bones.    1.  Metastatic breast cancer: She is on treatment with palbociclib and exemestane. PET/CT in 04/2023 showed disease progression in two small bone metastasis in L pelvis. She received radiation to these lesions.  She was continued on palbociclib but anastrozole was changed to exemestane. Follow up PET/CT in 07/2023 showed good response to treatment.  -PET from yesterday shows progression with new L breast lesion, new T7 lesion, and more metabolic activity in L iliac and posterior acetabulum bones.   - Discussed and reviewed images with Dr. Plunkett and we will plan to repeat biopsy for NGS to evaluate for ESR1 and PIK3CA mutations. Will order breast biopsy of new L breast lesion. Continue treatment with palbociclib and exemestane in the meantime.    -Follow-up with Dr. Plunkett or I when NGS results are back, in about 3-4 weeks.   - For next treatment we are considering SOC abemaciclib and fulvestrant vs treatment on the OP-1250 clinical trial (OP-1250 in combination with either ribociclib or alpelisib) as next line therapy.   - Continue to monitor monthly labs and tumor markers.     2.  Recent pneumonia/residual cough:  - Slowly improving. Using mucinex with relief. No active pulm issues on PET.     3.  Bone metastases/back pain:  She is s/p XRT to the left ilium as well as the lumbar spine and kyphoplasty of L4--there was some extravasation of cement which procedularist is aware of and recommended continued AC indefinitely.   - buprenorphine 5 mcg/hr patch, Robaxin,  and gabapentin.  Ongoing follow up with palliative medicine.  - Pain mostly continues in low back, intermittently in L groin.   - Receiving Zometa once every 3 months.  Next due around 2/4/2024.    4.  Schizoaffective disorder, bipolar type: No change since last visit.  She is on treatment with Zoloft and Seroquel. Dosing was recently increased.  Ongoing follow up with psychiatry.     5.  DVT:  No change since last visit.  She continues on Xarelto.    6.  Immunosuppressed patient:  UTD on COVID and influenza vaccinations    Greater than 40 minutes was spent with this patient with greater than 20 minutes spent in counseling and coordination of care.    Alee De Los Santos PA-C

## 2023-12-11 NOTE — TELEPHONE ENCOUNTER
Writer received a fax from [facility] pharmacy stating that patient is requesting authorization to dispense 90 d/s of:   sertraline (ZOLOFT) 100 MG tablet 30 tablet 2 12/8/2023  No   Sig - Route: Take 1 tablet (100 mg) by mouth daily - Oral   Sent to pharmacy as: Sertraline HCl 100 MG Oral Tablet (ZOLOFT)   Class: E-Prescribe   Order: [id number]   E-Prescribing Status: Receipt confirmed by pharmacy (12/8/2023  3:42 PM CST)     Writer pended this and routed to provider for approval.

## 2023-12-12 ENCOUNTER — ONCOLOGY VISIT (OUTPATIENT)
Dept: ONCOLOGY | Facility: CLINIC | Age: 48
End: 2023-12-12
Attending: PHYSICIAN ASSISTANT
Payer: MEDICARE

## 2023-12-12 VITALS
DIASTOLIC BLOOD PRESSURE: 107 MMHG | BODY MASS INDEX: 32.02 KG/M2 | HEART RATE: 80 BPM | OXYGEN SATURATION: 97 % | RESPIRATION RATE: 18 BRPM | SYSTOLIC BLOOD PRESSURE: 156 MMHG | WEIGHT: 236.1 LBS | TEMPERATURE: 97.8 F

## 2023-12-12 DIAGNOSIS — I82.432 ACUTE DEEP VEIN THROMBOSIS (DVT) OF POPLITEAL VEIN OF LEFT LOWER EXTREMITY (H): ICD-10-CM

## 2023-12-12 DIAGNOSIS — C79.51 METASTASIS TO BONE (H): ICD-10-CM

## 2023-12-12 DIAGNOSIS — M79.661 PAIN OF RIGHT LOWER LEG: ICD-10-CM

## 2023-12-12 DIAGNOSIS — C50.812 MALIGNANT NEOPLASM OF OVERLAPPING SITES OF LEFT BREAST IN FEMALE, ESTROGEN RECEPTOR POSITIVE (H): Primary | ICD-10-CM

## 2023-12-12 DIAGNOSIS — G89.3 CANCER ASSOCIATED PAIN: ICD-10-CM

## 2023-12-12 DIAGNOSIS — Z17.0 MALIGNANT NEOPLASM OF OVERLAPPING SITES OF LEFT BREAST IN FEMALE, ESTROGEN RECEPTOR POSITIVE (H): Primary | ICD-10-CM

## 2023-12-12 PROCEDURE — G0463 HOSPITAL OUTPT CLINIC VISIT: HCPCS | Performed by: PHYSICIAN ASSISTANT

## 2023-12-12 PROCEDURE — 99215 OFFICE O/P EST HI 40 MIN: CPT | Performed by: PHYSICIAN ASSISTANT

## 2023-12-12 RX ORDER — GABAPENTIN 300 MG/1
CAPSULE ORAL
Qty: 180 CAPSULE | Refills: 2 | Status: SHIPPED | OUTPATIENT
Start: 2023-12-12 | End: 2024-02-06

## 2023-12-12 RX ORDER — METHOCARBAMOL 500 MG/1
500 TABLET, FILM COATED ORAL 3 TIMES DAILY PRN
Qty: 30 TABLET | Refills: 0 | Status: SHIPPED | OUTPATIENT
Start: 2023-12-12 | End: 2024-01-02

## 2023-12-12 ASSESSMENT — PAIN SCALES - GENERAL: PAINLEVEL: SEVERE PAIN (6)

## 2023-12-12 NOTE — NURSING NOTE
Oncology Rooming Note    December 12, 2023 11:37 AM   Teresa Sanderson is a 48 year old female who presents for:    Chief Complaint   Patient presents with    Oncology Clinic Visit     Malignant Neoplasm of Left Breast     Initial Vitals: BP (!) 156/107 (BP Location: Left arm, Patient Position: Sitting, Cuff Size: Adult Large)   Pulse 80   Temp 97.8  F (36.6  C) (Oral)   Resp 18   Wt 107.1 kg (236 lb 1.6 oz)   SpO2 97%   BMI 32.02 kg/m   Estimated body mass index is 32.02 kg/m  as calculated from the following:    Height as of 11/21/23: 1.829 m (6').    Weight as of this encounter: 107.1 kg (236 lb 1.6 oz). Body surface area is 2.33 meters squared.  Severe Pain (6) Comment: Data Unavailable   No LMP recorded. (Menstrual status: Tubal ).  Allergies reviewed: Yes  Medications reviewed: Yes    Medications: MEDICATION REFILLS NEEDED TODAY. Provider was notified.  Pharmacy name entered into [facility]:    [id number] - [place], [place] - [address] AT [facility] [address]  [facility] - [place] - [address]  [id number] - [place], [place] - [address] [facility] [address] [facility] [address]  [facility] #[id number] - [place], [place] - [address] AT [address] & [facility] - [place], [place] - [address]     Clinical concerns: Imaging review       Serenity San LPN  12/12/2023

## 2023-12-12 NOTE — LETTER
12/12/2023         RE: Teresa Sanderson  [address] [address]        Dear Colleague,    Thank you for referring your patient, Teresa Sanderson, to the [facility]. Please see a copy of my visit note below.    Oncology Visit:   Date on this visit: Dec 12, 2023    Diagnosis:  ER positive left breast cancer metastasized to bones.     Primary Physician: No Ref-Primary, Physician     History Of Present Illness:    Ms. Sanderson is a 48 year old female with a h/o tobacco abuse and DVTs with left breast cancer metastasized to bone. She presented to [place] ED with back pain on 12/5/2020. MRI of the L-spine showed an abnormal L4 lesion with associated right paraspinal mass, abnormalities in L5 and the left iliac bone were also seen. CT C/A/P showed a left breast mass, lytic lesions of T7, L4, and the pelvis, and a 3 cm lesion in the kidney (thought to be a cyst). Ultrasound of the bilateral lower extremities showed a non-occlusive thrombus in the left popliteal vein. Mammogram and ultrasound of the bilateral breasts on 12/17/2020 showed a spiculated mass measuring at least 7.8 cm at 12-1:00 left breast extending from the nipple to 9 cm from the nipple with associated nipple retraction. This mass was biopsied, and showed IDC with surrounding DCIS, grade 3, ER+ 90%, and GA+ 75%.  HER2 was equivocal in approximately 35% of tumor cells by FISH and was negative by IHC.    Metastatic Breast Cancer Treatment:  12/23/2021 - 1/7/2021  Radiation (3000 cGy) to the lumbar spine.  1/29/2021 - present  Ibrance, zoladex, and anastrozole.  5/17/2021 radiofrequency ablation, kyphoplasty to L4  8/20/2021  Bilateral salpingo-oophorectomies, Ibrance and anastrozole.  She was off anastrozole 12/2021 - 2/2022 and off Ibrance 01/2022 - 02/2022 due to stress and impending homelessness. Off both again 03/2022-04/2022 due to death of her son but restarted in 05/2022.  04/2023  PET/CT with  "progression in 2 small metastases in the left pelvis.  Palbociclib continued.  Anastrozole changed to exemestane  5/5/2023  Completed radiation, 2000 cGy in 5 fractions, to the left ilium.    Interval History:   Ms. Sanderson comes into clinic today for routine metastatic breast cancer follow-up. She is joined by her son and her friend and sister join via [employer]. She has been really anxious about the results of the PET scan. In general she has been doing okay. Has waxing and waning \"panty liner\" or L groin pain. Most of her pain continues to be centered in low back and is worse in the morning. No mid back pain. She continues to be fairly sedentary.     She is eating and drinking okay. No recent nausea. Bowels and bladder have been moving well. No fevers/chills. Continues to have an intermittent cough at night but this is generally better. She uses mucinex PRN.        Past Medical/Surgical History:   Past Medical History:   Diagnosis Date    Anxiety     Breast CA (H) 12/2020    Depression     DVT (deep venous thrombosis) (H) 2014    Left breast mass     x approximately 4-5 months    Pulmonary emboli (H)     Pyelonephritis     Schizoaffective disorder (H)     Tobacco use      Past Surgical History:   Procedure Laterality Date    COLONOSCOPY N/A 7/8/2022    Procedure: COLONOSCOPY, WITH POLYPECTOMY;  Surgeon: Ham Cano MD;  [place] [facility] [place]    IR LUMBAR KYPHOPLASTY VERTEBRAE  5/17/2021    LAPAROSCOPIC SALPINGO-OOPHORECTOMY Bilateral 8/20/2021    Procedure: BILATERAL SALPINGO-OOPHORECTOMY, LAPAROSCOPIC;  Surgeon: Rory Lopez MD;  Location: [facility]    TUBAL LIGATION  1998        Allergies   Allergen Reactions    Contrast Dye      Pt developed nausea after isovue 370 injection on 6/9/21        Current Outpatient Medications   Medication    gabapentin (NEURONTIN) 300 MG capsule    methocarbamol (ROBAXIN) 500 MG tablet    rivaroxaban ANTICOAGULANT (XARELTO) 20 MG TABS tablet    acetaminophen (TYLENOL) " 500 MG tablet    albuterol (PROAIR HFA/PROVENTIL HFA/VENTOLIN HFA) 108 (90 Base) MCG/ACT inhaler    buprenorphine (BUTRANS) 5 MCG/HR WK patch    exemestane (AROMASIN) 25 MG tablet    methylPREDNISolone (MEDROL) 32 MG tablet    naloxone (NARCAN) 4 MG/0.1ML nasal spray    ondansetron (ZOFRAN) 8 MG tablet    palbociclib (IBRANCE) 125 MG tablet    pantoprazole (PROTONIX) 40 MG EC tablet    polyethylene glycol (MIRALAX) 17 GM/Dose powder    prochlorperazine (COMPAZINE) 10 MG tablet    QUEtiapine (SEROQUEL) 300 MG tablet    QUEtiapine (SEROQUEL) 50 MG tablet    SENNA-docusate sodium (SENNA S) 8.6-50 MG tablet    sertraline (ZOLOFT) 100 MG tablet    Vitamin D3 (CHOLECALCIFEROL) 25 mcg (1000 units) tablet     No current facility-administered medications for this visit.   '  Physical Exam:   BP (!) 156/107 (BP Location: Left arm, Patient Position: Sitting, Cuff Size: Adult Large)   Pulse 80   Temp 97.8  F (36.6  C) (Oral)   Resp 18   Wt 107.1 kg (236 lb 1.6 oz)   SpO2 97%   BMI 32.02 kg/m    Wt Readings from Last 4 Encounters:   12/12/23 107.1 kg (236 lb 1.6 oz)   11/28/23 106.8 kg (235 lb 6.4 oz)   11/21/23 105.7 kg (233 lb)   11/06/23 106.1 kg (233 lb 12.8 oz)   General:  Well appearing adult female in NAD.  Alert and oriented x 3.  HEENT:  Normocephalic.  Sclera anicteric. Palpable enlarged thyroid on exam.  MMM.  Lymph:  No palpable cervical, supraclavicular, or axillary LAD.   Chest:  CTA bilaterally.  No wheezes or crackles.  CV:  RRR.   Abd:  Soft/ND/NT +BS   Ext:  No edema of the bilateral lower extremities.    Neuro:  Cranial nerves grossly intact.  Alert and oriented x 3.  Psych:  Mood and affect appear normal.  She is pleasant and conversant.  Skin:  No visible concerning skin rashes or lesions.    Laboratory/Imaging Studies:   I personally reviewed the below laboratories and images:    PET 12/11/23  IMPRESSION:         1. Progressive disease when compared to PET/CT 7/27/2023 as evidenced  by:  - New  moderately hypermetabolic 0.8 cm medial left breast lesion  adjacent to the persistent mildly hypermetabolic biopsy-proven left  breast lesion, concerning for additional site of breast cancer.  - New hypermetabolic left T7 vertebral body and increased  hypermetabolic foci within the bony pelvis, concerning for progression  of osseous metastases.     2. Mild hypermetabolic focus in the left thyroid gland, favored to be  inflammatory in setting of prior FNA result of benign lymphocytic  Hashimoto thyroiditis on 1/23/23.      3. Stable L4 vertebroplasty with cement extravasation into the IVC.        ASSESSMENT/PLAN:   48 year old female with history of DVT and ER positive left breast cancer metastasized to bones.    1.  Metastatic breast cancer: She is on treatment with palbociclib and exemestane. PET/CT in 04/2023 showed disease progression in two small bone metastasis in L pelvis. She received radiation to these lesions.  She was continued on palbociclib but anastrozole was changed to exemestane. Follow up PET/CT in 07/2023 showed good response to treatment.  -PET from yesterday shows progression with new L breast lesion, new T7 lesion, and more metabolic activity in L iliac and posterior acetabulum bones.   - Discussed and reviewed images with Dr. Plunkett and we will plan to repeat biopsy for NGS to evaluate for ESR1 and PIK3CA mutations. Will order breast biopsy of new L breast lesion. Continue treatment with palbociclib and exemestane in the meantime.    -Follow-up with Dr. Plunkett or I when NGS results are back, in about 3-4 weeks.   - For next treatment we are considering SOC abemaciclib and fulvestrant vs treatment on the OP-1250 clinical trial (OP-1250 in combination with either ribociclib or alpelisib) as next line therapy.   - Continue to monitor monthly labs and tumor markers.     2.  Recent pneumonia/residual cough:  - Slowly improving. Using mucinex with relief. No active pulm issues on PET.     3.   Bone metastases/back pain:  She is s/p XRT to the left ilium as well as the lumbar spine and kyphoplasty of L4--there was some extravasation of cement which procedularist is aware of and recommended continued AC indefinitely.   - buprenorphine 5 mcg/hr patch, Robaxin, and gabapentin.  Ongoing follow up with palliative medicine.  - Pain mostly continues in low back, intermittently in L groin.   - Receiving Zometa once every 3 months.  Next due around 2/4/2024.    4.  Schizoaffective disorder, bipolar type: No change since last visit.  She is on treatment with Zoloft and Seroquel. Dosing was recently increased.  Ongoing follow up with psychiatry.     5.  DVT:  No change since last visit.  She continues on Xarelto.    6.  Immunosuppressed patient:  UTD on COVID and influenza vaccinations    Greater than 40 minutes was spent with this patient with greater than 20 minutes spent in counseling and coordination of care.    Alee De Los Santos PA-C

## 2023-12-19 ENCOUNTER — ANCILLARY PROCEDURE (OUTPATIENT)
Dept: MAMMOGRAPHY | Facility: CLINIC | Age: 48
End: 2023-12-19
Attending: PHYSICIAN ASSISTANT
Payer: MEDICARE

## 2023-12-19 ENCOUNTER — PATIENT OUTREACH (OUTPATIENT)
Dept: ONCOLOGY | Facility: CLINIC | Age: 48
End: 2023-12-19

## 2023-12-19 DIAGNOSIS — C50.812 MALIGNANT NEOPLASM OF OVERLAPPING SITES OF LEFT BREAST IN FEMALE, ESTROGEN RECEPTOR POSITIVE (H): ICD-10-CM

## 2023-12-19 DIAGNOSIS — Z17.0 MALIGNANT NEOPLASM OF OVERLAPPING SITES OF LEFT BREAST IN FEMALE, ESTROGEN RECEPTOR POSITIVE (H): ICD-10-CM

## 2023-12-19 PROCEDURE — 88305 TISSUE EXAM BY PATHOLOGIST: CPT | Mod: 26 | Performed by: PATHOLOGY

## 2023-12-19 PROCEDURE — 88360 TUMOR IMMUNOHISTOCHEM/MANUAL: CPT | Mod: 26 | Performed by: PATHOLOGY

## 2023-12-19 PROCEDURE — 88305 TISSUE EXAM BY PATHOLOGIST: CPT | Mod: TC | Performed by: PHYSICIAN ASSISTANT

## 2023-12-19 PROCEDURE — 76642 ULTRASOUND BREAST LIMITED: CPT | Mod: LT | Performed by: RADIOLOGY

## 2023-12-19 PROCEDURE — 88341 IMHCHEM/IMCYTCHM EA ADD ANTB: CPT | Mod: 26 | Performed by: PATHOLOGY

## 2023-12-19 PROCEDURE — 88342 IMHCHEM/IMCYTCHM 1ST ANTB: CPT | Mod: 26 | Performed by: PATHOLOGY

## 2023-12-19 PROCEDURE — 19083 BX BREAST 1ST LESION US IMAG: CPT | Mod: LT | Performed by: RADIOLOGY

## 2023-12-19 RX ORDER — LIDOCAINE HYDROCHLORIDE AND EPINEPHRINE 10; 10 MG/ML; UG/ML
10 INJECTION, SOLUTION INFILTRATION; PERINEURAL ONCE
Status: DISCONTINUED | OUTPATIENT
Start: 2023-12-19 | End: 2024-07-20

## 2023-12-19 NOTE — PROGRESS NOTES
[facility]: Cancer Care                                                                                          Pt turned in a form for Dr Plunkett to sign about her ability to work due to her cancer. Per Dr Plunkett she does not meet the requirements for having restrictions on working due to her cancer. Discussed with the patient and let her know Dr Plunkett did not feel she could sign it for the reason above. Pt verbalized understanding with no questions at this time.    Signature:  Myriam Ramirez RN

## 2023-12-21 ENCOUNTER — TELEPHONE (OUTPATIENT)
Dept: MAMMOGRAPHY | Facility: CLINIC | Age: 48
End: 2023-12-21
Payer: MEDICARE

## 2023-12-21 LAB
PATH REPORT.COMMENTS IMP SPEC: ABNORMAL
PATH REPORT.COMMENTS IMP SPEC: YES
PATH REPORT.FINAL DX SPEC: ABNORMAL
PATH REPORT.GROSS SPEC: ABNORMAL
PATH REPORT.MICROSCOPIC SPEC OTHER STN: ABNORMAL
PATH REPORT.RELEVANT HX SPEC: ABNORMAL
PATHOLOGY SYNOPTIC REPORT: ABNORMAL
PHOTO IMAGE: ABNORMAL

## 2023-12-21 NOTE — PROGRESS NOTES
Disease State Management Encounter:                          Teresa Sanderson is a 48 year old female called for an initial visit. She was referred to me from psychiatry.     Reason for visit: Review and recommendations around possible drug-drug interactions given ongoing cancer treatment and medical complexity concerns .    Schizoaffective Disorder, bipolar type:   Sertraline 100mg daily  Seroquel 350mg nightly   Gabapentin (prescribed for pain, restarted about 2 weeks ago)    Teresa is seen at [facility] by  Boris Taylor MD . Most recent visit was 12/8/23 at which time  it appears sertraline and Seroquel were restarted after patient had been off them for a period of time  . Please see note by pt's provider for additional details regarding symptoms and history.     Today, patient reports some improvement in depression with restarting sertraline, but still has ongoing symptoms related to her cancer diagnosis and loss of her son. She got news this week that her cancer has spread. Her daughter took FMLA to be with her and she is enjoying having her daughter and grandchildren around.     Assessment/Plan:    Reviewed medication list with patient today. In regard to drug interactions, there is potential risk of serotonin syndrome and QTc prolongation with sertraline, zofran (uses rarely), buprenorphine and potential increased risk of bleed with sertraline and Xarelto (risk would be similar with any selective serotonin reuptake inhibitor). Most recent QTc 423ms WNL and no signs/symptoms serotonin syndrome or bleed- would continue to monitor and I do not see a need to change her medications due to this. Would be reasonable to increase doses if indicated based on symptoms.      Follow-up: MTM as needed    I spent 20 minutes with this patient today. A copy of the visit note was provided to the patient's provider(s).    A summary of these recommendations was sent via [employer].    Lianne Tuttle  PharmSAAD, BCPP  Medication Therapy Management Pharmacist  [facility]       Medication Therapy Recommendations  No medication therapy recommendations to display

## 2023-12-21 NOTE — TELEPHONE ENCOUNTER
Left a message for Cleiris regarding availability of her breast biopsy results.  Awaiting return phone call.  Call back number left was [phone].

## 2023-12-22 ENCOUNTER — TELEPHONE (OUTPATIENT)
Dept: MAMMOGRAPHY | Facility: CLINIC | Age: 48
End: 2023-12-22
Payer: MEDICARE

## 2023-12-22 ENCOUNTER — VIRTUAL VISIT (OUTPATIENT)
Dept: PHARMACY | Facility: CLINIC | Age: 48
End: 2023-12-22
Payer: MEDICAID

## 2023-12-22 ENCOUNTER — PATIENT OUTREACH (OUTPATIENT)
Dept: ONCOLOGY | Facility: CLINIC | Age: 48
End: 2023-12-22
Payer: MEDICARE

## 2023-12-22 DIAGNOSIS — F25.0 SCHIZOAFFECTIVE DISORDER, BIPOLAR TYPE (H): Primary | ICD-10-CM

## 2023-12-22 PROCEDURE — 99207 PR NO CHARGE LOS: CPT | Mod: VID | Performed by: PHARMACIST

## 2023-12-22 NOTE — Clinical Note
Nikolas Escalante,   Thank you for the referral. Patient's insurance doesn't cover MTM, but I did a brief consult with med and drug interaction check  - see note for details, but nothing I would be overly concerned about. Overall, I think it would be reasonable to increase sertraline or Seroquel if needed based on symptoms.   Thank you!  Lianne

## 2023-12-22 NOTE — PROGRESS NOTES
[facility]: Cancer Care                                                                                          Submitted requisition for [facility] testing to be done on Specimen [id number].    Signature:  Myriam Ramirez RNCC

## 2023-12-22 NOTE — TELEPHONE ENCOUNTER
Spoke to Teresa about the finding of an additional area of Invasive Ductal Carcinoma in her left breast.  We discussed the Radiologist's recommendation of continuing following up with her Oncology team in regards to if this changes her treatment plan.  Teresa verbalized understanding and all questions and concerns were answered at this time.

## 2023-12-25 DIAGNOSIS — C50.912 CARCINOMA OF LEFT BREAST METASTATIC TO BONE (H): Primary | ICD-10-CM

## 2023-12-25 DIAGNOSIS — C79.51 CARCINOMA OF LEFT BREAST METASTATIC TO BONE (H): Primary | ICD-10-CM

## 2023-12-27 DIAGNOSIS — C50.812 MALIGNANT NEOPLASM OF OVERLAPPING SITES OF LEFT BREAST IN FEMALE, ESTROGEN RECEPTOR POSITIVE (H): Primary | ICD-10-CM

## 2023-12-27 DIAGNOSIS — Z17.0 MALIGNANT NEOPLASM OF OVERLAPPING SITES OF LEFT BREAST IN FEMALE, ESTROGEN RECEPTOR POSITIVE (H): Primary | ICD-10-CM

## 2023-12-28 NOTE — PROGRESS NOTES
"Palliative Care Progress Note    Patient Name: Teresa Sanderson  Primary Provider: No Ref-Primary, Physician    Chief Complaint/Patient ID: Medical - She has metastatic breast cancer dx 12/2020; widespread bone mets. S/p RT to lumbar spine and RFA/kyphoplasty L4 5/2021.   11/2022 on Ibrance and anastrazole since 1/2021 (with some treatment interruptions). Long discussion about voluntary opioid tapering 11/15/22 palliative visit.      She has schizoaffective disorder (bipolar type); followed by psychiatry at the .     -Currently on treatment with palbociclib and exemestane. PET/CT in 04/2023 showed disease progression in two small bone metastasis in L pelvis, S/p 5 fractions RT to these lesions.   -Some lapses/breaks in treatment due to complicated social situation.  -Evidence of PD on 12/2023 PET scan.  Plans to repeat biopsy and continue current treatment read    Last Palliative care appointment: 11/21/23 with me. Started Butrans patch at 5mcg.     Reviewed: Yes, it appears patient picked up a refill of MSContin after we started the butrans patch     Interim History:  Teresa Sanderson is a 48 year old female who is seen today for follow up with Palliative Care via billable video visit.      Started the patch.  States that she has not noticed much change in her pain.  She did stop the morphine once she started the patch.  She is still been rotating naproxen/ibuprofen/Tylenol, however she can never remember which medicine is okay to take with the others, so if she takes 1 of these medicines, she just does not take another 1 the rest of the day.    Pain in her back is constant but varies in severity.  Pain around her panty line on the left side travels up and down her leg, and it is not consistent but it feels like a spasm.    Taking Robaxin with gabapentin.  Does think it somewhat helpful.    Says that she \"rocks\" back-and-forth due to all of the anxiety/worry that she has.  Has been focusing on trying to not " "worry quite as much, to stay positive, and to find things that can help her move forward in a positive way.  Has been really helpful to have her daughter and grandchildren now staying with her-\"they brought life into this house\".     Social History:  Lives with cousin and son. On SSD. 5 adult children. Housing insecure. Daughter murdered in . Son  Spring 2022. Worked as a  for [employer] before cancer dx, in [place].   No LOYD hx  Social History     Tobacco Use    Smoking status: Some Days     Packs/day: .25     Types: Cigarettes     Start date: 2011     Passive exposure: Current    Smokeless tobacco: Never   Substance Use Topics    Alcohol use: Not Currently     Comment: occ    Drug use: Yes     Types: Marijuana     Comment: occ     Family History- Reviewed in Epic.    Medications- Reviewed in Epic.    Past Medical History- Reviewed in [facility].    Past Surgical History- Reviewed in Epic.    Physical Exam:   Constitutional: Alert, pleasant, no apparent distress. Sitting up in chair.  Eyes: Sclera non-icteric, no eye discharge.  ENT: No nasal discharge. Ears grossly normal.  Respiratory: Unlabored respirations. Speaking in full sentences.  Musculoskeletal: Rocking back and forth.  Extremities appear normal- no gross deformities noted. No edema noted on upper body.   Skin: No suspicious lesions or rashes on visible skin.  Neurologic: Clear speech, no aphasia. No facial droop.  Psychiatric: Mentation appears normal, appropriate attention. Affect normal/bright. Does not appear anxious or depressed.    Key Data Reviewed:  LABS: 23- Cr 0.94, GFR 74, Albumin 4, LFTs wnl. WBC 3.5, Hgb 11.3, Plts 164. CA 15-3 of 48. CEA of 3.    IMAGING: PET scan 23- IMPRESSION:   1. Progressive disease when compared to PET/CT 2023 as evidenced by:  - New moderately hypermetabolic 0.8 cm medial left breast lesion adjacent to the persistent mildly hypermetabolic biopsy-proven left breast lesion, concerning " "for additional site of breast cancer.  - New hypermetabolic left T7 vertebral body and increased hypermetabolic foci within the bony pelvis, concerning for progression of osseous metastases.  2. Mild hypermetabolic focus in the left thyroid gland, favored to be inflammatory in setting of prior FNA result of benign lymphocytic Hashimoto thyroiditis on 1/23/23.   3. Stable L4 vertebroplasty with cement extravasation into the IVC.    Impression & Recommendations & Counseling:  Teresa Sanderson is a 48 year old female with history of metastatic breast cancer.     Cancer related pain:  Pain control continues to be an ongoing concern.  Plan to increase Butrans patch to 10 mcg q. 7 days today.  I asked her to let me know how things are going in 1 to 2 weeks, as we can adjust further.    Clarified with her that it is okay to take Tylenol on days that she takes either ibuprofen or naproxen, however she should not take the ibuprofen and naproxen together.    Increase the range on her methocarbamol to allow for up to 1000 mg 3 times daily.    Anxiety:  We discussed ways to manage anxiety.  Shared\" from Yocasta Johnson with her.    Follow up: 4 to 6 weeks    Video-Visit Details  Video Start Time: 4:30 PM  Video End Time: 4:55 PM    Originating Location (pt. Location): Home     Distant Location (provider location):  Offsite- Personal Home      Platform used for Video Visit: Chad     Total time spent on day of encounter is 36 mins, including reviewing record, review of above studies, above visit with patient, symptomatic discussion of primarily pain as well as some with anxiety, including medication adjustments/prescription management, and documentation.     Ayanna Tellez,   Palliative Medicine   [id number]    Some chart documentation performed using Dragon Voice recognition Software. Although reviewed after completion, some words and grammatical errors may remain.    "

## 2023-12-29 ENCOUNTER — PATIENT OUTREACH (OUTPATIENT)
Dept: CARE COORDINATION | Facility: CLINIC | Age: 48
End: 2023-12-29
Payer: MEDICARE

## 2024-01-02 ENCOUNTER — PATIENT OUTREACH (OUTPATIENT)
Dept: CARE COORDINATION | Facility: CLINIC | Age: 49
End: 2024-01-02

## 2024-01-02 ENCOUNTER — VIRTUAL VISIT (OUTPATIENT)
Dept: PALLIATIVE CARE | Facility: CLINIC | Age: 49
End: 2024-01-02
Attending: STUDENT IN AN ORGANIZED HEALTH CARE EDUCATION/TRAINING PROGRAM
Payer: MEDICARE

## 2024-01-02 VITALS — WEIGHT: 235 LBS | BODY MASS INDEX: 31.83 KG/M2 | HEIGHT: 72 IN

## 2024-01-02 DIAGNOSIS — M62.838 MUSCLE SPASM: ICD-10-CM

## 2024-01-02 DIAGNOSIS — F41.9 ANXIETY: ICD-10-CM

## 2024-01-02 DIAGNOSIS — Z98.890 BACK PAIN WITH HISTORY OF SPINAL SURGERY: ICD-10-CM

## 2024-01-02 DIAGNOSIS — C79.51 CARCINOMA OF LEFT BREAST METASTATIC TO BONE (H): Primary | ICD-10-CM

## 2024-01-02 DIAGNOSIS — M54.9 BACK PAIN WITH HISTORY OF SPINAL SURGERY: ICD-10-CM

## 2024-01-02 DIAGNOSIS — Z51.5 ENCOUNTER FOR PALLIATIVE CARE: ICD-10-CM

## 2024-01-02 DIAGNOSIS — G89.3 CANCER ASSOCIATED PAIN: ICD-10-CM

## 2024-01-02 DIAGNOSIS — C50.912 CARCINOMA OF LEFT BREAST METASTATIC TO BONE (H): Primary | ICD-10-CM

## 2024-01-02 PROCEDURE — 99215 OFFICE O/P EST HI 40 MIN: CPT | Mod: 95 | Performed by: STUDENT IN AN ORGANIZED HEALTH CARE EDUCATION/TRAINING PROGRAM

## 2024-01-02 RX ORDER — BUPRENORPHINE 10 UG/H
1 PATCH TRANSDERMAL
Qty: 4 PATCH | Refills: 1 | Status: SHIPPED | OUTPATIENT
Start: 2024-01-02 | End: 2024-02-06

## 2024-01-02 RX ORDER — METHOCARBAMOL 500 MG/1
500-1000 TABLET, FILM COATED ORAL 3 TIMES DAILY PRN
Qty: 90 TABLET | Refills: 2 | Status: SHIPPED | OUTPATIENT
Start: 2024-01-02 | End: 2024-02-06

## 2024-01-02 ASSESSMENT — PAIN SCALES - GENERAL: PAINLEVEL: SEVERE PAIN (7)

## 2024-01-02 NOTE — NURSING NOTE
Is the patient currently in the state of [place]? YES    Visit mode:VIDEO    If the visit is dropped, the patient can be reconnected by: VIDEO VISIT: Text to cell phone:   Telephone Information:   Mobile [phone]       Will anyone else be joining the visit? NO  (If patient encounters technical issues they should call [phone]    How would you like to obtain your AVS? MyChart    Are changes needed to the allergy or medication list? Pt stated no changes to allergies and Pt stated no med changes    Reason for visit: LUISA Mccarthy VVF

## 2024-01-02 NOTE — PATIENT INSTRUCTIONS
"Recommendations:  -Or increasing the Butrans patch.  I sent a new prescription today.  -Remember that you can take Tylenol on the same day that you take either ibuprofen (Advil) or naproxen (Aleve).  You just do not want to take ibuprofen and naproxen at the same day.  -I am also increasing the range on your methocarbamol (Robaxin) which is the muscle spasm medicine.  You cannot take 1 or 2 pills at a time up to 3 times daily.  -Please let me know how pain control is in the next 1 to 2 weeks after you increase the patch.  There are other things we can do to make adjustments to your pain regimen.    *Quote from Yocasta Johnson regarding anxiety/worry- Worrying is carrying tomorrow's load with today's strength- carrying two days at once. It is moving into tomorrow ahead of time. Worrying doesn't empty tomorrow of its sorrow, it empties today of its strength\".      Follow up: 4 to 6 weeks      Reasons to Call    If you are having worsening/uncontrolled symptoms we want you to call!    You or your other physicians make any changes to medications we have prescribed.  -Please call for refills 4-5 days before you will run out of medication.    Important Phone Numbers, including: Refills, scheduling, and general questions     Palliative Care RN: Aisha Shelton - [phone]  *After hours or on weekends. Will connect you with on call MD. [phone]    "

## 2024-01-02 NOTE — LETTER
1/2/2024       RE: Teresa Sanderson  [address] [address]     Dear Colleague,    Thank you [facility] referring your patient, Teresa Sanderson[facility]. Please see a copy of my visit note below.    Palliative Care Progress Note    Patient Name: Teresa Sanderson  Primary Provider: No Ref-Primary, Physician    Chief Complaint/Patient ID: Medical - She has metastatic breast cancer dx 12/2020; widespread bone mets. S/p RT to lumbar spine and RFA/kyphoplasty L4 5/2021.   11/2022 on Ibrance and anastrazole since 1/2021 (with some treatment interruptions). Long discussion about voluntary opioid tapering 11/15/22 palliative visit.      She has schizoaffective disorder (bipolar type); followed by psychiatry at the .     -Currently on treatment with palbociclib and exemestane. PET/CT in 04/2023 showed disease progression in two small bone metastasis in L pelvis, S/p 5 fractions RT to these lesions.   -Some lapses/breaks in treatment due to complicated social situation.  -Evidence of PD on 12/2023 PET scan.  Plans to repeat biopsy and continue current treatment read    Last Palliative care appointment: 11/21/23 with me. Started Butrans patch at 5mcg.     Reviewed: Yes, it appears patient picked up a refill of MSContin after we started the butrans patch     Interim History:  Teresa Sanderson is a 48 year old female who is seen today for follow up with Palliative Care via billable video visit.      Started the patch.  States that she has not noticed much change in her pain.  She did stop the morphine once she started the patch.  She is still been rotating naproxen/ibuprofen/Tylenol, however she can never remember which medicine is okay to take with the others, so if she takes 1 of these medicines, she just does not take another 1 the rest of the day.    Pain in her back is constant but varies in severity.  Pain  "around her panty line on the left side travels up and down her leg, and it is not consistent but it feels like a spasm.    Taking Robaxin with gabapentin.  Does think it somewhat helpful.    Says that she \"rocks\" back-and-forth due to all of the anxiety/worry that she has.  Has been focusing on trying to not worry quite as much, to stay positive, and to find things that can help her move forward in a positive way.  Has been really helpful to have her daughter and grandchildren now staying with her-\"they brought life into this house\".     Social History:  Lives with cousin and son. On SSD. 5 adult children. Housing insecure. Daughter murdered in . Son  Spring 2022. Worked as a  for [employer] before cancer dx, in [place].   No LOYD hx  Social History     Tobacco Use    Smoking status: Some Days     Packs/day: .25     Types: Cigarettes     Start date: 2011     Passive exposure: Current    Smokeless tobacco: Never   Substance Use Topics    Alcohol use: Not Currently     Comment: occ    Drug use: Yes     Types: Marijuana     Comment: occ     Family History- Reviewed in Epic.    Medications- Reviewed in Epic.    Past Medical History- Reviewed in [facility].    Past Surgical History- Reviewed in Epic.    Physical Exam:   Constitutional: Alert, pleasant, no apparent distress. Sitting up in chair.  Eyes: Sclera non-icteric, no eye discharge.  ENT: No nasal discharge. Ears grossly normal.  Respiratory: Unlabored respirations. Speaking in full sentences.  Musculoskeletal: Rocking back and forth.  Extremities appear normal- no gross deformities noted. No edema noted on upper body.   Skin: No suspicious lesions or rashes on visible skin.  Neurologic: Clear speech, no aphasia. No facial droop.  Psychiatric: Mentation appears normal, appropriate attention. Affect normal/bright. Does not appear anxious or depressed.    Key Data Reviewed:  LABS: 23- Cr 0.94, GFR 74, Albumin 4, LFTs wnl. WBC 3.5, Hgb 11.3, Plts " "164. CA 15-3 of 48. CEA of 3.    IMAGING: PET scan 12/11/23- IMPRESSION:   1. Progressive disease when compared to PET/CT 7/27/2023 as evidenced by:  - New moderately hypermetabolic 0.8 cm medial left breast lesion adjacent to the persistent mildly hypermetabolic biopsy-proven left breast lesion, concerning for additional site of breast cancer.  - New hypermetabolic left T7 vertebral body and increased hypermetabolic foci within the bony pelvis, concerning for progression of osseous metastases.  2. Mild hypermetabolic focus in the left thyroid gland, favored to be inflammatory in setting of prior FNA result of benign lymphocytic Hashimoto thyroiditis on 1/23/23.   3. Stable L4 vertebroplasty with cement extravasation into the IVC.    Impression & Recommendations & Counseling:  Teresa Sanderson is a 48 year old female with history of metastatic breast cancer.     Cancer related pain:  Pain control continues to be an ongoing concern.  Plan to increase Butrans patch to 10 mcg q. 7 days today.  I asked her to let me know how things are going in 1 to 2 weeks, as we can adjust further.    Clarified with her that it is okay to take Tylenol on days that she takes either ibuprofen or naproxen, however she should not take the ibuprofen and naproxen together.    Increase the range on her methocarbamol to allow for up to 1000 mg 3 times daily.    Anxiety:  We discussed ways to manage anxiety.  Shared\" from Yocasta Johnson with her.    Follow up: 4 to 6 weeks    Video-Visit Details  Video Start Time: 4:30 PM  Video End Time: 4:55 PM    Originating Location (pt. Location): Home     Distant Location (provider location):  Offsite- Personal Home      Platform used for Video Visit: Chad     Total time spent on day of encounter is 36 mins, including reviewing record, review of above studies, above visit with patient, symptomatic discussion of primarily pain as well as some with anxiety, including medication adjustments/prescription " management, and documentation.       Some chart documentation performed using Dragon Voice recognition Software. Although reviewed after completion, some words and grammatical errors may remain.      Again, thank you for allowing me to participate in the care of your patient.      Sincerely,    Ayanna Tellez, DO

## 2024-01-03 ENCOUNTER — TELEPHONE (OUTPATIENT)
Dept: PALLIATIVE CARE | Facility: CLINIC | Age: 49
End: 2024-01-03

## 2024-01-03 ENCOUNTER — PATIENT OUTREACH (OUTPATIENT)
Dept: CARE COORDINATION | Facility: CLINIC | Age: 49
End: 2024-01-03
Payer: MEDICARE

## 2024-01-03 NOTE — TELEPHONE ENCOUNTER
Prior Authorization Approval for long-term opioid use only. The medication itself was not approved, per Nov at [employer] 2/27/24    Medication: BUPRENORPHINE 10 MCG/HR TD PTWK  Authorization Effective Date: 1/3/2024  Authorization Expiration Date:  12/31/24  Approved Dose/Quantity:   Reference #:     Insurance Company: [employer] Part D - Phone [phone] Fax [phone]  Expected CoPay: $    CoPay Card Available:      Financial Assistance Needed:   Which Pharmacy is filling the prescription: [id number] - [facility] [address] AT [address] [place] [address]  Pharmacy Notified: Yes  Patient Notified:

## 2024-01-03 NOTE — PROGRESS NOTES
Social Work - Follow-Up  [facility]    Data/Intervention:    Patient Name: Teresa Sanderson Goes By: Teresa    /Age: 1975 (48 year old)    Reason for Follow-Up:  FMLA paperwork    Collaborated With:    -patient  -patient's care team    Intervention/Education/Resources Provided:  SW received FMLA paperwork from patient and emailed this to patient's psychiatrist per the contact information patient's psychiatrist provided.     SW updated patient about [facility] paperwork being completed and gave patient completed [facility] paperwork for them to turn in to their [facility] .     Assessment/Plan:  Patient's psychiatrist in communication with patient about [place] paperwork and will continue to work with patient directly regarding completing paperwork.     Previously provided patient/family with writer's contact information and availability.      Jo CORTEZ, [facility]  - Oncology  Phone : [phone]  Pager: [phone]

## 2024-01-03 NOTE — TELEPHONE ENCOUNTER
Central Prior Authorization Team   Phone: [phone]    PA Initiation    Medication: BUPRENORPHINE 10 MCG/HR TD PTWK  Insurance Company: [employer] Part D - Phone [phone] Fax [phone]  Pharmacy Filling the Rx: [id number] [address] [facility] AT [address] [place]  Filling Pharmacy Phone: [phone]  Filling Pharmacy Fax:    Start Date: 1/3/2024

## 2024-01-11 LAB — SPECIMEN STATUS: NORMAL

## 2024-01-13 NOTE — PROGRESS NOTES
Oncology Visit:   Date on this visit: Jimy 15, 2024    Diagnosis:  ER positive left breast cancer metastasized to bones.     Primary Physician: No Ref-Primary, Physician     History Of Present Illness:    Ms. Sanderson is a 48 year old female with a h/o tobacco abuse and DVTs with left breast cancer metastasized to bone. She presented to [place] ED with back pain on 12/5/2020. MRI of the L-spine showed an abnormal L4 lesion with associated right paraspinal mass, abnormalities in L5 and the left iliac bone were also seen. CT C/A/P showed a left breast mass, lytic lesions of T7, L4, and the pelvis, and a 3 cm lesion in the kidney (thought to be a cyst). Ultrasound of the bilateral lower extremities showed a non-occlusive thrombus in the left popliteal vein. Mammogram and ultrasound of the bilateral breasts on 12/17/2020 showed a spiculated mass measuring at least 7.8 cm at 12-1:00 left breast extending from the nipple to 9 cm from the nipple with associated nipple retraction. This mass was biopsied, and showed IDC with surrounding DCIS, grade 3, ER+ 90%, and NC+ 75%.  HER2 was equivocal in approximately 35% of tumor cells by FISH and was negative by IHC.    Metastatic Breast Cancer Treatment:  12/23/2021 - 1/7/2021  Radiation (3000 cGy) to the lumbar spine.  1/29/2021 - present  Ibrance, zoladex, and anastrozole.  5/17/2021 radiofrequency ablation, kyphoplasty to L4  8/20/2021  Bilateral salpingo-oophorectomies, Ibrance and anastrozole.  She was off anastrozole 12/2021 - 2/2022 and off Ibrance 01/2022 - 02/2022 due to stress and impending homelessness. Off both again 03/2022-04/2022 due to death of her son but restarted in 05/2022.  04/2023  PET/CT with progression in 2 small metastases in the left pelvis.  Palbociclib continued.  Anastrozole changed to exemestane  5/5/2023  Completed radiation, 2000 cGy in 5 fractions, to the left ilium.  12/19/2023 Left breast biopsy  [facility] NGS:   ER positive, 3+  100%   NC  positive, 1+, 5%   HER2 negative.   AR positive, 1+, 35%   MMR proficient   PD-L1 negative, CPS 0   PTEN positive, 1+ 100%  *Of note, all of the above was IHC, DNA quantity not sufficient for NGS    Interval History:   Ms. Sanderson comes into clinic today for routine breast cancer follow-up.  She notes that since her last visit, she has had significant growth of her left breast mass.  She notes increased size and swelling.  In addition she has a pressure-like pain.  There is a burning component to the pain.  She reports some retraction of the skin just above her nipple that is most prominent when she lies down.  She also notes increased low back pain with radiation down the left lower extremity.  This is worse after she has been sitting for a prolonged period of time.  Her joints overall feel very stiff after prolonged sitting.  She is otherwise without new or concerning symptoms.       Past Medical/Surgical History:   Past Medical History:   Diagnosis Date    Anxiety     Breast CA (H) 12/2020    Depression     DVT (deep venous thrombosis) (H) 2014    Left breast mass     x approximately 4-5 months    Pulmonary emboli (H)     Pyelonephritis     Schizoaffective disorder (H)     Tobacco use      Past Surgical History:   Procedure Laterality Date    COLONOSCOPY N/A 7/8/2022    Procedure: COLONOSCOPY, WITH POLYPECTOMY;  Surgeon: Ham Cano MD;  [place] [facility] [place]    [place] LUMBAR KYPHOPLASTY VERTEBRAE  5/17/2021    LAPAROSCOPIC SALPINGO-OOPHORECTOMY Bilateral 8/20/2021    Procedure: BILATERAL SALPINGO-OOPHORECTOMY, LAPAROSCOPIC;  Surgeon: Rory Lopez MD;  Location: [facility] OR    TUBAL LIGATION  1998        Allergies   Allergen Reactions    Contrast Dye      Pt developed nausea after isovue 370 injection on 6/9/21        Current Outpatient Medications   Medication    acetaminophen (TYLENOL) 500 MG tablet    albuterol (PROAIR HFA/PROVENTIL HFA/VENTOLIN HFA) 108 (90 Base) MCG/ACT inhaler    buprenorphine  (BUTRANS) 10 MCG/HR WK patch    buprenorphine (BUTRANS) 5 MCG/HR WK patch    exemestane (AROMASIN) 25 MG tablet    gabapentin (NEURONTIN) 300 MG capsule    methocarbamol (ROBAXIN) 500 MG tablet    methylPREDNISolone (MEDROL) 32 MG tablet    naloxone (NARCAN) 4 MG/0.1ML nasal spray    ondansetron (ZOFRAN) 8 MG tablet    palbociclib (IBRANCE) 125 MG tablet    pantoprazole (PROTONIX) 40 MG EC tablet    polyethylene glycol (MIRALAX) 17 GM/Dose powder    prochlorperazine (COMPAZINE) 10 MG tablet    QUEtiapine (SEROQUEL) 300 MG tablet    QUEtiapine (SEROQUEL) 50 MG tablet    rivaroxaban ANTICOAGULANT (XARELTO) 20 MG TABS tablet    SENNA-docusate sodium (SENNA S) 8.6-50 MG tablet    sertraline (ZOLOFT) 100 MG tablet    Vitamin D3 (CHOLECALCIFEROL) 25 mcg (1000 units) tablet     No current facility-administered medications for this visit.     Facility-Administered Medications Ordered in Other Visits   Medication    lidocaine 1% with EPINEPHrine 1:100,000 injection 10 mL   '  Physical Exam:   BP (!) 147/94 (BP Location: Right arm, Patient Position: Sitting, Cuff Size: Adult Regular)   Pulse 57   Temp 98.5  F (36.9  C) (Oral)   Resp 18   Wt 107.7 kg (237 lb 6.4 oz)   SpO2 98%   BMI 32.20 kg/m    General:  Well appearing adult female in NAD.  Alert and oriented x 3.  HEENT:  Normocephalic.  Sclera anicteric. Palpable enlarged thyroid on exam.  MMM.  No lesions of the oropharynx.  Lymph:  No palpable cervical, supraclavicular, or axillary LAD.   Chest:  CTA bilaterally.  No wheezes or crackles.  CV:  RRR. No audible m/r/g.  Breast:  At 12:00 left breast is a 3 x 3.5 cm palpable mass with overlying skin retraction. No notable skin involvement or ulceration.    Abd:  Soft/ND/NT   Ext:  No edema of the bilateral lower extremities.    Neuro:  Cranial nerves grossly intact.  Alert and oriented x 3.  Psych:  Mood and affect appear normal.    Skin:  No visible concerning skin rashes or lesions.    Laboratory/Imaging Studies:   I  personally reviewed the below laboratories and images:    PET 12/11/23:  FINDINGS:      HEAD/NECK:  Mild focal uptake in the left thyroid gland with SUV max 3.8 without  definite corresponding abnormality on CT.     CHEST:  New moderate focal uptake in the 0.8 cm medial left breast soft tissue  lesion (4/129) with SUV max 6.0.     Continued mild focal uptake associated with the 1.7 x 1.4 cm left  breast soft tissue lesion containing biopsy clip (4/116) with SUV max  4.1.     Mild wall adherent debris in the trachea. Right lower lobe calcified  granuloma.     ABDOMEN AND PELVIS:  No abnormal uptake.      Splenule. Colonic diverticulosis. Stable findings of vertebroplasty at  L4 with cement extravasation into the IVC.     LOWER EXTREMITIES:   No abnormal uptake.      BONES:   Increased uptake associated with the spine and bony pelvis, including:     -  New left T7 vertebral body mixed lytic and sclerotic lesion (4/136)  with SUV max 5.1  - Left posterior acetabulum without corresponding CT (4/264) with SUV  max 11.2  - 0.8 cm left iliac osteolytic lesion (4/234) with SUV max 5.5     Degenerative uptake associated with the left C2 facet arthropathy and  bilateral tibiofemoral joints.     Scattered sclerotic foci throughout the skeleton. L4 kyphoplasty  changes. Degenerative changes in spine.      IMPRESSION:      1. Progressive disease when compared to PET/CT 7/27/2023 as evidenced  by:  - New moderately hypermetabolic 0.8 cm medial left breast lesion  adjacent to the persistent mildly hypermetabolic biopsy-proven left  breast lesion, concerning for additional site of breast cancer.  - New hypermetabolic left T7 vertebral body and increased  hypermetabolic foci within the bony pelvis, concerning for progression  of osseous metastases.     2. Mild hypermetabolic focus in the left thyroid gland, favored to be  inflammatory in setting of prior FNA result of benign lymphocytic  Hashimoto thyroiditis on 1/23/23.      3.  Stable L4 vertebroplasty with cement extravasation into the IVC.    1/15/2024 Labs:  Electrolytes are wnl with the exception of chloride elevated at 109 mmol/L  Kidney and liver function tests are wnl.   is elevated at 46  CEA is 3.8    WBC is low at 2.4; ANC is low at 1.3  Hemoglobin is low at 10.5 g/dL  Platelets are wnl.       Latest Reference Range & Units 08/01/23 08:41 10/05/23 13:16 10/27/23 14:28 11/06/23 14:36   Cancer Antigen 15-3 <=25 U/mL 36 (H) 40 (H) 53 (H) 47 (H)   (H): Data is abnormally high   Latest Reference Range & Units 10/05/23 13:16 10/27/23 14:28 11/06/23 14:36 11/28/23 08:45   CEA ng/mL 2.8 3.0 3.0 3.0       ASSESSMENT/PLAN:   48 year old female with history of DVT and ER positive left breast cancer metastasized to bones.    1.  Metastatic breast cancer: Most recently on treatment with palbociclib and exemestane.  PET/CT in 12/2023 c/w disease progression in the left breast and bones including T7, left ilium, and left posterior acetabulum.  - Biopsy of left breast mass performed on 12/19/2023 shows the tumor remains ER positive and HER2 negative.  Unfortunately, [facility] NGS for ESR1 and PIK3CA mutations was unable to be performed due to insufficient quantity of DNA.  I spoke with both radiology (Dr. Vazquez) and pathology (Dr. Ward) who both noted that the biopsied mass was very small and the invasive carcinoma in the specimen was also very small.  - I spoke with Dr. Carranza, the PI for the [place] phase I OP-1250 trial.  Unfortunately, Teresa is not a candidate for the trial as palbociclib + anastrozole and palbociclib + exemestane count as 2 separate prior endocrine therapies and the ribociclib + OP-1250 limits eligibility to 1 prior line of endocrine therapy.  I discussed this with Teresa today.  - Therefore, my recommendation at this time is to change treatment to abemaciclib and fulvestrant.  We discussed abemaciclib is a CDK 4/6 inhibitor.  It is similar to palbociclib, however,  due to differences in the medications (abemaciclib has less myelotoxicity allowing for continuous dosing and targets a broader range of cyclin dependent kinases) abemaciclib often still has anti-cancer activity in patients who have previously progressed on palbociclib.  - Abemaciclib is dosed at 150 mg PO BID.  We reviewed the common side effects of abemaciclib including fatigue, thinning of the hair, nausea, diarrhea, and cytopenias.  Fulvestrant is administered as 2 injections, 1 in each buttock, on days 1, 15, and 29, then every 4 weeks.  Potential side effects of fulvestrant include injection site pain, nausea, diarrhea, cytopenias, elevation of liver enzymes, hot flashes and arthralgias.  She verbally consented to starting this treatment regimen.  - Orders for abemaciclib have been placed and oral chemotherapy pharmacy notified.      - Plan to monitor blood counts every 2 weeks x 2 months.  - will monitor monthly tumor markers and repeat a PET/CT 2-3 months after starting.    2.  Bone metastases/low back pain with LLE sciatica:  She is s/p XRT to the left ilium as well as the lumbar spine and kyphoplasty of L4--there was some extravasation of cement which procedularist is aware of and recommended continued AC indefinitely.   - buprenorphine 5 mcg/hr patch, Robaxin, and gabapentin.  Ongoing follow up with palliative medicine.  - Pain mostly continues in low back, intermittently in L groin.   This correlates with recent disease progression in the left acetabulum.  Change in cancer treatment as above.  - Receiving Zometa once every 3 months.  Next due around 2/4/2024.    3.  Schizoaffective disorder, bipolar type: No change since last visit.  She is on treatment with Zoloft and Seroquel. Ongoing follow up with psychiatry.     4.  DVT:  No change since last visit.  She continues on Xarelto.    5.  Chemotherapy induced neutropenia and anemia:  Secondary to ribociclib, exacerbated by bone metastases.  - ANC 1.3  today.  Continue to follow infectious precautions.  Blood count monitoring with new start abemaciclib as above.    6.  Follow up:  Fulvestrant injection within 1-2 weeks.  Then again 2 and 4 weeks later.  Labs and Zometa around 2/4/2024.  Visit with Alee in 4-6 weeks.  Visit with me in approximately 12 weeks.

## 2024-01-15 ENCOUNTER — ONCOLOGY VISIT (OUTPATIENT)
Dept: ONCOLOGY | Facility: CLINIC | Age: 49
End: 2024-01-15
Attending: INTERNAL MEDICINE
Payer: MEDICARE

## 2024-01-15 ENCOUNTER — APPOINTMENT (OUTPATIENT)
Dept: LAB | Facility: CLINIC | Age: 49
End: 2024-01-15
Attending: INTERNAL MEDICINE
Payer: MEDICARE

## 2024-01-15 ENCOUNTER — MYC MEDICAL ADVICE (OUTPATIENT)
Dept: ONCOLOGY | Facility: CLINIC | Age: 49
End: 2024-01-15

## 2024-01-15 VITALS
RESPIRATION RATE: 18 BRPM | OXYGEN SATURATION: 98 % | WEIGHT: 237.4 LBS | HEART RATE: 57 BPM | TEMPERATURE: 98.5 F | DIASTOLIC BLOOD PRESSURE: 94 MMHG | SYSTOLIC BLOOD PRESSURE: 147 MMHG | BODY MASS INDEX: 32.2 KG/M2

## 2024-01-15 DIAGNOSIS — G89.29 CHRONIC MIDLINE LOW BACK PAIN WITH LEFT-SIDED SCIATICA: ICD-10-CM

## 2024-01-15 DIAGNOSIS — Z17.0 MALIGNANT NEOPLASM OF OVERLAPPING SITES OF LEFT BREAST IN FEMALE, ESTROGEN RECEPTOR POSITIVE (H): ICD-10-CM

## 2024-01-15 DIAGNOSIS — C79.51 CARCINOMA OF LEFT BREAST METASTATIC TO BONE (H): Primary | ICD-10-CM

## 2024-01-15 DIAGNOSIS — C50.812 MALIGNANT NEOPLASM OF OVERLAPPING SITES OF LEFT BREAST IN FEMALE, ESTROGEN RECEPTOR POSITIVE (H): ICD-10-CM

## 2024-01-15 DIAGNOSIS — M54.42 CHRONIC MIDLINE LOW BACK PAIN WITH LEFT-SIDED SCIATICA: ICD-10-CM

## 2024-01-15 DIAGNOSIS — C50.912 CARCINOMA OF LEFT BREAST METASTATIC TO BONE (H): Primary | ICD-10-CM

## 2024-01-15 DIAGNOSIS — T45.1X5A CHEMOTHERAPY-INDUCED NEUTROPENIA (H): ICD-10-CM

## 2024-01-15 DIAGNOSIS — D70.1 CHEMOTHERAPY-INDUCED NEUTROPENIA (H): ICD-10-CM

## 2024-01-15 DIAGNOSIS — C50.919 BREAST CANCER (H): Primary | ICD-10-CM

## 2024-01-15 DIAGNOSIS — G89.3 CANCER ASSOCIATED PAIN: ICD-10-CM

## 2024-01-15 LAB
ALBUMIN SERPL BCG-MCNC: 4 G/DL (ref 3.5–5.2)
ALP SERPL-CCNC: 95 U/L (ref 40–150)
ALT SERPL W P-5'-P-CCNC: 12 U/L (ref 0–50)
ANION GAP SERPL CALCULATED.3IONS-SCNC: 7 MMOL/L (ref 7–15)
AST SERPL W P-5'-P-CCNC: 16 U/L (ref 0–45)
BASOPHILS # BLD AUTO: ABNORMAL 10*3/UL
BASOPHILS # BLD MANUAL: 0 10E3/UL (ref 0–0.2)
BASOPHILS NFR BLD AUTO: ABNORMAL %
BASOPHILS NFR BLD MANUAL: 2 %
BILIRUB SERPL-MCNC: 0.2 MG/DL
BUN SERPL-MCNC: 9.4 MG/DL (ref 6–20)
CALCIUM SERPL-MCNC: 9 MG/DL (ref 8.6–10)
CHLORIDE SERPL-SCNC: 109 MMOL/L (ref 98–107)
CREAT SERPL-MCNC: 0.85 MG/DL (ref 0.51–0.95)
DEPRECATED HCO3 PLAS-SCNC: 26 MMOL/L (ref 22–29)
EGFRCR SERPLBLD CKD-EPI 2021: 84 ML/MIN/1.73M2
EOSINOPHIL # BLD AUTO: ABNORMAL 10*3/UL
EOSINOPHIL # BLD MANUAL: 0 10E3/UL (ref 0–0.7)
EOSINOPHIL NFR BLD AUTO: ABNORMAL %
EOSINOPHIL NFR BLD MANUAL: 1 %
ERYTHROCYTE [DISTWIDTH] IN BLOOD BY AUTOMATED COUNT: 15.9 % (ref 10–15)
FRAGMENTS BLD QL SMEAR: SLIGHT
GLUCOSE SERPL-MCNC: 126 MG/DL (ref 70–99)
HCT VFR BLD AUTO: 31.3 % (ref 35–47)
HGB BLD-MCNC: 10.5 G/DL (ref 11.7–15.7)
IMM GRANULOCYTES # BLD: ABNORMAL 10*3/UL
IMM GRANULOCYTES NFR BLD: ABNORMAL %
LYMPHOCYTES # BLD AUTO: ABNORMAL 10*3/UL
LYMPHOCYTES # BLD MANUAL: 1 10E3/UL (ref 0.8–5.3)
LYMPHOCYTES NFR BLD AUTO: ABNORMAL %
LYMPHOCYTES NFR BLD MANUAL: 40 %
MCH RBC QN AUTO: 34.5 PG (ref 26.5–33)
MCHC RBC AUTO-ENTMCNC: 33.5 G/DL (ref 31.5–36.5)
MCV RBC AUTO: 103 FL (ref 78–100)
MONOCYTES # BLD AUTO: ABNORMAL 10*3/UL
MONOCYTES # BLD MANUAL: 0 10E3/UL (ref 0–1.3)
MONOCYTES NFR BLD AUTO: ABNORMAL %
MONOCYTES NFR BLD MANUAL: 2 %
NEUTROPHILS # BLD AUTO: ABNORMAL 10*3/UL
NEUTROPHILS # BLD MANUAL: 1.3 10E3/UL (ref 1.6–8.3)
NEUTROPHILS NFR BLD AUTO: ABNORMAL %
NEUTROPHILS NFR BLD MANUAL: 55 %
NRBC # BLD AUTO: 0 10E3/UL
NRBC BLD AUTO-RTO: 0 /100
PLAT MORPH BLD: ABNORMAL
PLATELET # BLD AUTO: 170 10E3/UL (ref 150–450)
POTASSIUM SERPL-SCNC: 3.7 MMOL/L (ref 3.4–5.3)
PROT SERPL-MCNC: 7 G/DL (ref 6.4–8.3)
RBC # BLD AUTO: 3.04 10E6/UL (ref 3.8–5.2)
RBC MORPH BLD: ABNORMAL
SODIUM SERPL-SCNC: 142 MMOL/L (ref 135–145)
WBC # BLD AUTO: 2.4 10E3/UL (ref 4–11)

## 2024-01-15 PROCEDURE — G0463 HOSPITAL OUTPT CLINIC VISIT: HCPCS | Performed by: INTERNAL MEDICINE

## 2024-01-15 PROCEDURE — 85027 COMPLETE CBC AUTOMATED: CPT | Performed by: INTERNAL MEDICINE

## 2024-01-15 PROCEDURE — 99215 OFFICE O/P EST HI 40 MIN: CPT | Performed by: INTERNAL MEDICINE

## 2024-01-15 PROCEDURE — 80053 COMPREHEN METABOLIC PANEL: CPT | Performed by: INTERNAL MEDICINE

## 2024-01-15 PROCEDURE — 86300 IMMUNOASSAY TUMOR CA 15-3: CPT | Performed by: INTERNAL MEDICINE

## 2024-01-15 PROCEDURE — 82378 CARCINOEMBRYONIC ANTIGEN: CPT | Performed by: INTERNAL MEDICINE

## 2024-01-15 PROCEDURE — 36415 COLL VENOUS BLD VENIPUNCTURE: CPT | Performed by: INTERNAL MEDICINE

## 2024-01-15 PROCEDURE — 85007 BL SMEAR W/DIFF WBC COUNT: CPT | Performed by: INTERNAL MEDICINE

## 2024-01-15 RX ORDER — LAMOTRIGINE 25 MG/1
500 TABLET ORAL ONCE
Status: CANCELLED | OUTPATIENT
Start: 2024-01-31 | End: 2024-01-31

## 2024-01-15 RX ORDER — LAMOTRIGINE 25 MG/1
500 TABLET ORAL ONCE
Status: CANCELLED | OUTPATIENT
Start: 2024-01-17 | End: 2024-01-17

## 2024-01-15 ASSESSMENT — PAIN SCALES - GENERAL: PAINLEVEL: SEVERE PAIN (6)

## 2024-01-15 NOTE — NURSING NOTE
Oncology Rooming Note    January 15, 2024 2:27 PM   Teresa Sanderson is a 48 year old female who presents for:    Chief Complaint   Patient presents with    Blood Draw     Labs drawn via  by RN in lab. VS taken.     Oncology Clinic Visit     Malignant neoplasm of overlapping sites of left breast in female, estrogen receptor positive      Initial Vitals: BP (!) 147/94 (BP Location: Right arm, Patient Position: Sitting, Cuff Size: Adult Regular)   Pulse 57   Temp 98.5  F (36.9  C) (Oral)   Resp 18   Wt 107.7 kg (237 lb 6.4 oz)   SpO2 98%   BMI 32.20 kg/m   Estimated body mass index is 32.2 kg/m  as calculated from the following:    Height as of 1/2/24: 1.829 m (6').    Weight as of this encounter: 107.7 kg (237 lb 6.4 oz). Body surface area is 2.34 meters squared.  Severe Pain (6) Comment: Data Unavailable   No LMP recorded. (Menstrual status: Tubal ).  Allergies reviewed: No  Medications reviewed: No    Medications: Medication refills not needed today.  Pharmacy name entered into [facility]:    [id number] - [place], [place] - [address] AT [facility] [address]  [facility] - [place] [address]  [id number] - [place], [place] - [address] [facility]  [facility][id number] - [place], [place] - [address] AT [address] & [facility] - [place], [place] - [address]    Frailty Screening:   Is the patient here for a new oncology consult visit in cancer care? 2. No      Clinical concerns: Pt is still experiencing constant low back ache and pain      Darius Verma

## 2024-01-15 NOTE — LETTER
1/15/2024         RE: Teresa Sanderson  [address] [facility] [address]        Dear Colleague,    Thank you for referring your patient, Teresa Sanderson, to the [facility]. Please see a copy of my visit note below.    Oncology Visit:   Date on this visit: Jimy 15, 2024    Diagnosis:  ER positive left breast cancer metastasized to bones.     Primary Physician: No Ref-Primary, Physician     History Of Present Illness:    Ms. Sanderson is a 48 year old female with a h/o tobacco abuse and DVTs with left breast cancer metastasized to bone. She presented to [place] ED with back pain on 12/5/2020. MRI of the L-spine showed an abnormal L4 lesion with associated right paraspinal mass, abnormalities in L5 and the left iliac bone were also seen. CT C/A/P showed a left breast mass, lytic lesions of T7, L4, and the pelvis, and a 3 cm lesion in the kidney (thought to be a cyst). Ultrasound of the bilateral lower extremities showed a non-occlusive thrombus in the left popliteal vein. Mammogram and ultrasound of the bilateral breasts on 12/17/2020 showed a spiculated mass measuring at least 7.8 cm at 12-1:00 left breast extending from the nipple to 9 cm from the nipple with associated nipple retraction. This mass was biopsied, and showed IDC with surrounding DCIS, grade 3, ER+ 90%, and MD+ 75%.  HER2 was equivocal in approximately 35% of tumor cells by FISH and was negative by IHC.    Metastatic Breast Cancer Treatment:  12/23/2021 - 1/7/2021  Radiation (3000 cGy) to the lumbar spine.  1/29/2021 - present  Ibrance, zoladex, and anastrozole.  5/17/2021 radiofrequency ablation, kyphoplasty to L4  8/20/2021  Bilateral salpingo-oophorectomies, Ibrance and anastrozole.  She was off anastrozole 12/2021 - 2/2022 and off Ibrance 01/2022 - 02/2022 due to stress and impending homelessness. Off both again 03/2022-04/2022 due to death of her son but restarted in 05/2022.  04/2023  PET/CT with  progression in 2 small metastases in the left pelvis.  Palbociclib continued.  Anastrozole changed to exemestane  5/5/2023  Completed radiation, 2000 cGy in 5 fractions, to the left ilium.  12/19/2023 Left breast biopsy  [facility] NGS:   ER positive, 3+  100%   NM positive, 1+, 5%   HER2 negative.   AR positive, 1+, 35%   MMR proficient   PD-L1 negative, CPS 0   PTEN positive, 1+ 100%  *Of note, all of the above was IHC, DNA quantity not sufficient for NGS    Interval History:   Ms. Sanderson comes into clinic today for routine breast cancer follow-up.  She notes that since her last visit, she has had significant growth of her left breast mass.  She notes increased size and swelling.  In addition she has a pressure-like pain.  There is a burning component to the pain.  She reports some retraction of the skin just above her nipple that is most prominent when she lies down.  She also notes increased low back pain with radiation down the left lower extremity.  This is worse after she has been sitting for a prolonged period of time.  Her joints overall feel very stiff after prolonged sitting.  She is otherwise without new or concerning symptoms.       Past Medical/Surgical History:   Past Medical History:   Diagnosis Date    Anxiety     Breast CA (H) 12/2020    Depression     DVT (deep venous thrombosis) (H) 2014    Left breast mass     x approximately 4-5 months    Pulmonary emboli (H)     Pyelonephritis     Schizoaffective disorder (H)     Tobacco use      Past Surgical History:   Procedure Laterality Date    COLONOSCOPY N/A 7/8/2022    Procedure: COLONOSCOPY, WITH POLYPECTOMY;  Surgeon: Ham Cano MD;  [place] [facility] LUMBAR KYPHOPLASTY VERTEBRAE  5/17/2021    LAPAROSCOPIC SALPINGO-OOPHORECTOMY Bilateral 8/20/2021    Procedure: BILATERAL SALPINGO-OOPHORECTOMY, LAPAROSCOPIC;  Surgeon: Rory Lopez MD;  [place] [facility] [place]    TUBAL LIGATION  1998        Allergies   Allergen Reactions    Contrast Dye       Pt developed nausea after isovue 370 injection on 6/9/21        Current Outpatient Medications   Medication    acetaminophen (TYLENOL) 500 MG tablet    albuterol (PROAIR HFA/PROVENTIL HFA/VENTOLIN HFA) 108 (90 Base) MCG/ACT inhaler    buprenorphine (BUTRANS) 10 MCG/HR WK patch    buprenorphine (BUTRANS) 5 MCG/HR WK patch    exemestane (AROMASIN) 25 MG tablet    gabapentin (NEURONTIN) 300 MG capsule    methocarbamol (ROBAXIN) 500 MG tablet    methylPREDNISolone (MEDROL) 32 MG tablet    naloxone (NARCAN) 4 MG/0.1ML nasal spray    ondansetron (ZOFRAN) 8 MG tablet    palbociclib (IBRANCE) 125 MG tablet    pantoprazole (PROTONIX) 40 MG EC tablet    polyethylene glycol (MIRALAX) 17 GM/Dose powder    prochlorperazine (COMPAZINE) 10 MG tablet    QUEtiapine (SEROQUEL) 300 MG tablet    QUEtiapine (SEROQUEL) 50 MG tablet    rivaroxaban ANTICOAGULANT (XARELTO) 20 MG TABS tablet    SENNA-docusate sodium (SENNA S) 8.6-50 MG tablet    sertraline (ZOLOFT) 100 MG tablet    Vitamin D3 (CHOLECALCIFEROL) 25 mcg (1000 units) tablet     No current facility-administered medications for this visit.     Facility-Administered Medications Ordered in Other Visits   Medication    lidocaine 1% with EPINEPHrine 1:100,000 injection 10 mL   '  Physical Exam:   BP (!) 147/94 (BP Location: Right arm, Patient Position: Sitting, Cuff Size: Adult Regular)   Pulse 57   Temp 98.5  F (36.9  C) (Oral)   Resp 18   Wt 107.7 kg (237 lb 6.4 oz)   SpO2 98%   BMI 32.20 kg/m    General:  Well appearing adult female in NAD.  Alert and oriented x 3.  HEENT:  Normocephalic.  Sclera anicteric. Palpable enlarged thyroid on exam.  MMM.  No lesions of the oropharynx.  Lymph:  No palpable cervical, supraclavicular, or axillary LAD.   Chest:  CTA bilaterally.  No wheezes or crackles.  CV:  RRR. No audible m/r/g.  Breast:  At 12:00 left breast is a 3 x 3.5 cm palpable mass with overlying skin retraction. No notable skin involvement or ulceration.    Abd:   Soft/ND/NT   Ext:  No edema of the bilateral lower extremities.    Neuro:  Cranial nerves grossly intact.  Alert and oriented x 3.  Psych:  Mood and affect appear normal.    Skin:  No visible concerning skin rashes or lesions.    Laboratory/Imaging Studies:   I personally reviewed the below laboratories and images:    PET 12/11/23:  FINDINGS:      HEAD/NECK:  Mild focal uptake in the left thyroid gland with SUV max 3.8 without  definite corresponding abnormality on CT.     CHEST:  New moderate focal uptake in the 0.8 cm medial left breast soft tissue  lesion (4/129) with SUV max 6.0.     Continued mild focal uptake associated with the 1.7 x 1.4 cm left  breast soft tissue lesion containing biopsy clip (4/116) with SUV max  4.1.     Mild wall adherent debris in the trachea. Right lower lobe calcified  granuloma.     ABDOMEN AND PELVIS:  No abnormal uptake.      Splenule. Colonic diverticulosis. Stable findings of vertebroplasty at  L4 with cement extravasation into the IVC.     LOWER EXTREMITIES:   No abnormal uptake.      BONES:   Increased uptake associated with the spine and bony pelvis, including:     -  New left T7 vertebral body mixed lytic and sclerotic lesion (4/136)  with SUV max 5.1  - Left posterior acetabulum without corresponding CT (4/264) with SUV  max 11.2  - 0.8 cm left iliac osteolytic lesion (4/234) with SUV max 5.5     Degenerative uptake associated with the left C2 facet arthropathy and  bilateral tibiofemoral joints.     Scattered sclerotic foci throughout the skeleton. L4 kyphoplasty  changes. Degenerative changes in spine.      IMPRESSION:      1. Progressive disease when compared to PET/CT 7/27/2023 as evidenced  by:  - New moderately hypermetabolic 0.8 cm medial left breast lesion  adjacent to the persistent mildly hypermetabolic biopsy-proven left  breast lesion, concerning for additional site of breast cancer.  - New hypermetabolic left T7 vertebral body and increased  hypermetabolic foci  within the bony pelvis, concerning for progression  of osseous metastases.     2. Mild hypermetabolic focus in the left thyroid gland, favored to be  inflammatory in setting of prior FNA result of benign lymphocytic  Hashimoto thyroiditis on 1/23/23.      3. Stable L4 vertebroplasty with cement extravasation into the IVC.    1/15/2024 Labs:  Electrolytes are wnl with the exception of chloride elevated at 109 mmol/L  Kidney and liver function tests are wnl.   is elevated at 46  CEA is 3.8    WBC is low at 2.4; ANC is low at 1.3  Hemoglobin is low at 10.5 g/dL  Platelets are wnl.       Latest Reference Range & Units 08/01/23 08:41 10/05/23 13:16 10/27/23 14:28 11/06/23 14:36   Cancer Antigen 15-3 <=25 U/mL 36 (H) 40 (H) 53 (H) 47 (H)   (H): Data is abnormally high   Latest Reference Range & Units 10/05/23 13:16 10/27/23 14:28 11/06/23 14:36 11/28/23 08:45   CEA ng/mL 2.8 3.0 3.0 3.0       ASSESSMENT/PLAN:   48 year old female with history of DVT and ER positive left breast cancer metastasized to bones.    1.  Metastatic breast cancer: Most recently on treatment with palbociclib and exemestane.  PET/CT in 12/2023 c/w disease progression in the left breast and bones including T7, left ilium, and left posterior acetabulum.  - Biopsy of left breast mass performed on 12/19/2023 shows the tumor remains ER positive and HER2 negative.  Unfortunately, [facility] NGS for ESR1 and PIK3CA mutations was unable to be performed due to insufficient quantity of DNA.  I spoke with both radiology (Dr. Vazquez) and pathology (Dr. Ward) who both noted that the biopsied mass was very small and the invasive carcinoma in the specimen was also very small.  - I spoke with Dr. Carranza, the PI for the [place] phase I OP-1250 trial.  Unfortunately, Teresa is not a candidate for the trial as palbociclib + anastrozole and palbociclib + exemestane count as 2 separate prior endocrine therapies and the ribociclib + OP-1250 limits eligibility to 1  prior line of endocrine therapy.  I discussed this with Teresa today.  - Therefore, my recommendation at this time is to change treatment to abemaciclib and fulvestrant.  We discussed abemaciclib is a CDK 4/6 inhibitor.  It is similar to palbociclib, however, due to differences in the medications (abemaciclib has less myelotoxicity allowing for continuous dosing and targets a broader range of cyclin dependent kinases) abemaciclib often still has anti-cancer activity in patients who have previously progressed on palbociclib.  - Abemaciclib is dosed at 150 mg PO BID.  We reviewed the common side effects of abemaciclib including fatigue, thinning of the hair, nausea, diarrhea, and cytopenias.  Fulvestrant is administered as 2 injections, 1 in each buttock, on days 1, 15, and 29, then every 4 weeks.  Potential side effects of fulvestrant include injection site pain, nausea, diarrhea, cytopenias, elevation of liver enzymes, hot flashes and arthralgias.  She verbally consented to starting this treatment regimen.  - Orders for abemaciclib have been placed and oral chemotherapy pharmacy notified.      - Plan to monitor blood counts every 2 weeks x 2 months.  - will monitor monthly tumor markers and repeat a PET/CT 2-3 months after starting.    2.  Bone metastases/low back pain with LLE sciatica:  She is s/p XRT to the left ilium as well as the lumbar spine and kyphoplasty of L4--there was some extravasation of cement which procedularist is aware of and recommended continued AC indefinitely.   - buprenorphine 5 mcg/hr patch, Robaxin, and gabapentin.  Ongoing follow up with palliative medicine.  - Pain mostly continues in low back, intermittently in L groin.   This correlates with recent disease progression in the left acetabulum.  Change in cancer treatment as above.  - Receiving Zometa once every 3 months.  Next due around 2/4/2024.    3.  Schizoaffective disorder, bipolar type: No change since last visit.  She is on  treatment with Zoloft and Seroquel. Ongoing follow up with psychiatry.     4.  DVT:  No change since last visit.  She continues on Xarelto.    5.  Chemotherapy induced neutropenia and anemia:  Secondary to ribociclib, exacerbated by bone metastases.  - ANC 1.3 today.  Continue to follow infectious precautions.  Blood count monitoring with new start abemaciclib as above.    6.  Follow up:  Fulvestrant injection within 1-2 weeks.  Then again 2 and 4 weeks later.  Labs and Zometa around 2/4/2024.  Visit with Alee in 4-6 weeks.  Visit with me in approximately 12 weeks.            Jahaira Plunkett MD

## 2024-01-16 ENCOUNTER — TELEPHONE (OUTPATIENT)
Dept: ONCOLOGY | Facility: CLINIC | Age: 49
End: 2024-01-16
Payer: MEDICARE

## 2024-01-16 ENCOUNTER — DOCUMENTATION ONLY (OUTPATIENT)
Dept: ONCOLOGY | Facility: CLINIC | Age: 49
End: 2024-01-16
Payer: MEDICARE

## 2024-01-16 NOTE — ORAL ONC MGMT
Oral Chemotherapy Monitoring Program     Placed call to patient in follow up of abemaciclib therapy.     Got a hold of patient then the call dropped, when I called back I had to leave a message to please call back in follow-up of therapy. No patient or drug names were mentioned.     [place] Pharm[facility] Intern  [facility]   [phone]   1/16/2024

## 2024-01-17 NOTE — PROGRESS NOTES
Social Work - Follow-Up  [facility]    Data/Intervention:    Patient Name: Teresa Sanderson Goes By: Teresa    /Age: 1975 (48 year old)    Reason for Follow-Up:  [place] paperwork    Collaborated With:    -patient  -patient's care team    Intervention/Education/Resources Provided:  SW sent a message to patient's psychiatrist regarding patient's request for assistance in completing [place] paperwork.     SW followed up with patient who reported they were unable to email FMLA paperwork to provider or to SW.     Patient and SW discussed a time for patient to bring in FMLA paperwork to be provided to patient's psychiatrists to review.     SW followed up with patient's oncology care team and was able to review Critical access hospital paperwork patient had previously provided in request of oncology provider to complete.     In review of of Critical access hospital paperwork SW was able to clarify with patient's oncology care team the Critical access hospital paperwork was for county assistance, cash assistance and food stamps program.       Assessment/Plan:  Dr. Plunkett completed Critical access hospital paperwork, SW will return paperwork to patient when they bring in [facility] paperwork.     Previously provided patient/family with writer's contact information and availability.      Jo CORTEZ, [facility]  - Oncology  Phone : [phone]  Pager: [phone]

## 2024-01-17 NOTE — PROGRESS NOTES
Social Work - Intervention  [facility]  Data/Intervention:    Patient Name: Teresa Sanderson Goes By: Teresa    /Age: 1975 (48 year old)     Visit Type: telephone  Referral Source: care team  Reason for Referral: FMLA paperwork    Collaborated With:    -patient  -     Psychosocial Information/Concerns:  Patient is requesting FMLA paperwork be completed for their daughter who has taken a leave from their job in [place] to stay with patient. Patient's daughter is requesting a period of time off. Dr. Plunkett has reviewed the leave request and does not feel at this time patient is in need of a 24 hour caregiver related to medical cares. Patient reports feeling depressed and anxious related to their health condition.     Patient is unsure which provider is appropriate to complete the paperwork.     Patient is concerned about FMLA paperwork being completed as previous [facility] paperwork was not completed and they wonder who they should be requesting to complete FMLA paperwork.      Intervention/Education/Resources Provided:  SW discussed the role of FMLA leave and explored what supports patient's daughter would provide to patient.     SW provided psychosocial assessment of both physical supports, ADL cares/supports, mental helath supports.     Patient discussed impacts of treatment and symptoms of their health they cope with and manage.     Patient discussed their mental health, symptoms of their mental health, impacts of this in completing their ADL's and supports they need surrounding this.     SW assessed the appropriate provider to complete FMLA paperwork and discussed patient's psychiatrist as the most appropriate provider to assist patient.     SW explored what [facility] paperwork patient mentioned previously was not completed to clarify what paperwork was intended for.      Assessment/Plan:  SW will follow up with patient's psychiatrist to check if they would be able to assist patient with  [facility] paperwork.     Patient will bring in [facility] paperwork for psychiatrist team to review.     SW will check with oncology care team regarding [facility] paperwork to review if [facility] paperwork is appropriate to be completed by oncology provider.     Provided patient/family with contact information and availability.    Jo CORTEZ, [facility]  - Oncology  Phone : [phone]  Pager: [phone]

## 2024-01-18 ENCOUNTER — TELEPHONE (OUTPATIENT)
Dept: ONCOLOGY | Facility: CLINIC | Age: 49
End: 2024-01-18
Payer: MEDICARE

## 2024-01-18 ENCOUNTER — INFUSION THERAPY VISIT (OUTPATIENT)
Dept: ONCOLOGY | Facility: CLINIC | Age: 49
End: 2024-01-18
Attending: INTERNAL MEDICINE
Payer: MEDICARE

## 2024-01-18 VITALS
OXYGEN SATURATION: 98 % | HEART RATE: 62 BPM | DIASTOLIC BLOOD PRESSURE: 101 MMHG | SYSTOLIC BLOOD PRESSURE: 153 MMHG | TEMPERATURE: 98.2 F | RESPIRATION RATE: 18 BRPM

## 2024-01-18 DIAGNOSIS — C79.51 CARCINOMA OF LEFT BREAST METASTATIC TO BONE (H): Primary | ICD-10-CM

## 2024-01-18 DIAGNOSIS — C50.812 MALIGNANT NEOPLASM OF OVERLAPPING SITES OF LEFT BREAST IN FEMALE, ESTROGEN RECEPTOR POSITIVE (H): Primary | ICD-10-CM

## 2024-01-18 DIAGNOSIS — C50.912 CARCINOMA OF LEFT BREAST METASTATIC TO BONE (H): Primary | ICD-10-CM

## 2024-01-18 DIAGNOSIS — C50.812 MALIGNANT NEOPLASM OF OVERLAPPING SITES OF LEFT BREAST IN FEMALE, ESTROGEN RECEPTOR POSITIVE (H): ICD-10-CM

## 2024-01-18 DIAGNOSIS — Z17.0 MALIGNANT NEOPLASM OF OVERLAPPING SITES OF LEFT BREAST IN FEMALE, ESTROGEN RECEPTOR POSITIVE (H): Primary | ICD-10-CM

## 2024-01-18 DIAGNOSIS — Z17.0 MALIGNANT NEOPLASM OF OVERLAPPING SITES OF LEFT BREAST IN FEMALE, ESTROGEN RECEPTOR POSITIVE (H): ICD-10-CM

## 2024-01-18 DIAGNOSIS — C50.912 CARCINOMA OF LEFT BREAST METASTATIC TO BONE (H): ICD-10-CM

## 2024-01-18 DIAGNOSIS — C79.51 CARCINOMA OF LEFT BREAST METASTATIC TO BONE (H): ICD-10-CM

## 2024-01-18 LAB
CANCER AG15-3 SERPL-ACNC: 46 U/ML
CEA SERPL-MCNC: 3.8 NG/ML

## 2024-01-18 PROCEDURE — 96402 CHEMO HORMON ANTINEOPL SQ/IM: CPT

## 2024-01-18 PROCEDURE — 250N000011 HC RX IP 250 OP 636: Mod: JZ | Performed by: INTERNAL MEDICINE

## 2024-01-18 RX ORDER — LAMOTRIGINE 25 MG/1
500 TABLET ORAL ONCE
Status: COMPLETED | OUTPATIENT
Start: 2024-01-18 | End: 2024-01-18

## 2024-01-18 RX ORDER — LOPERAMIDE HCL 2 MG
CAPSULE ORAL
Qty: 30 CAPSULE | Refills: 0 | Status: SHIPPED | OUTPATIENT
Start: 2024-01-18 | End: 2024-07-20

## 2024-01-18 RX ORDER — PROCHLORPERAZINE MALEATE 10 MG
10 TABLET ORAL EVERY 6 HOURS PRN
Qty: 30 TABLET | Refills: 2 | Status: SHIPPED | OUTPATIENT
Start: 2024-01-18 | End: 2024-07-20

## 2024-01-18 RX ADMIN — FULVESTRANT 500 MG: 50 INJECTION, SOLUTION INTRAMUSCULAR at 15:25

## 2024-01-18 ASSESSMENT — PAIN SCALES - GENERAL: PAINLEVEL: SEVERE PAIN (6)

## 2024-01-18 NOTE — PROGRESS NOTES
Infusion Nursing Note:  Teresa Sanderson presents today for Cycle 1 Day 1 Faslodex.    Patient seen by provider today: No   present during visit today: Not Applicable.    Note: Patient receiving Faslodex for the first time. Escorted to chair and VS obtained. Teaching done previously by Dr. Plunkett during 1/15 visit.  Reinforced teaching/side effects and schedule during infusion visit.     Pt reports 6/10 chronic low back pain. She was using Butrans patches but finds that they only stay on for about 3 days. She has been taking morphine instead prescribed by Dr. Plunkett (per pt but medication not mentioned in her note) but not in medication list and pt unaware of dose she is taking. Denies need for further intervention.    Copy of AVS reviewed with patient. Pt instructed to call care coordinator, triage (or MD on call if after hours/weekends) with chills/temp >=100.4, questions/concerns. Pt stated understanding of plan.     Post Infusion Assessment:  Patient tolerated 2 Faslodex injections into bilateral ventrogluteals without incident.       Discharge Plan:   Patient declined prescription refills.  Discharge instructions reviewed with: Patient.  Patient and/or family verbalized understanding of discharge instructions and all questions answered.  AVS to patient via [employer].  Patient will return 2/1 for next appointment.   Patient discharged in stable condition accompanied by: self.  Departure Mode: Ambulatory.      Nadya Cho RN

## 2024-01-18 NOTE — PATIENT INSTRUCTIONS
Contact Numbers    [facility] Main Line (for Scheduling/Triage/After Hours Nurse Line): [phone]    Please call the [facility] nurse triage or the after hours nurse line if you experience a temperature greater than or equal to 100.4, shaking chills, have uncontrolled nausea, vomiting and/or diarrhea, dizziness, lightheadedness, shortness of breath, chest pain, bleeding, unexplained bruising, or if you have any other new/concerning symptoms, questions or concerns.     If you are having any concerning symptoms or wish to speak to a provider before your next infusion visit, please call your care coordinator or triage to notify them so we can adequately serve you.     If you need any refills on medications (narcotics or other medications), please call before your infusion appointment.      January 2024 Sunday Monday Tuesday Wednesday Thursday Friday Saturday        1     2    RETURN CCSL   4:05 PM   (40 min.)   Ayanna Tellez DO   [facility] 3     4     5     6       7     8     9     10     11     12     13       14     15    LAB PERIPHERAL   2:00 PM   (15 min.)   [facility] DRAW   [facility]    RETURN CCSL   2:15 PM   (30 min.)   Jahaira Plunkett MD   [facility] 16     17     18    ONC INFUSION 0.5 HR (30 MIN)   2:30 PM   (30 min.)    ONC INFUSION NURSE   [facility] 19     20       21     22     23     24     25     26     27       28     29     30     31                                February 2024 Sunday Monday Tuesday Wednesday Thursday Friday Saturday                       1    LAB PERIPHERAL   7:00 AM   (15 min.)    [facility]RAW   [facility]    ONC INFUSION 1 HR (60 MIN)   7:30 AM   (60 min.)    ONC INFUSION NURSE   [facility] 2     3       4     5     6    RETURN CCSL  11:05 AM   (40 min.)    Ayanna Tellez DO   [facility] 7     8     9     10       11     12     13     14     15    LAB PERIPHERAL   2:30 PM   (15 min.)   [facility] DRAW   [facility]    RETURN CCSL   2:45 PM   (45 min.)   Alee De Los Santos PA-C   [facility] 16     17       18     19     20     21     22     23     24       25     26     27     28     29                               Lab Results:  No results found for this or any previous visit (from the past 12 hour(s)).

## 2024-01-18 NOTE — ORAL ONC MGMT
Oral Chemotherapy Monitoring Program    Lab Monitoring Plan  CBC, CMP, TSH/T4, Protein random urine, baseline then monthly   Subjective/Objective:  Teresa Sanderson is a 48 year old female contacted by phone for an initial visit for oral chemotherapy education.          8/30/2023    11:00 AM 8/30/2023     4:00 PM 10/6/2023     8:00 AM 10/25/2023     4:00 PM 10/30/2023    12:00 PM 11/22/2023     8:00 AM 1/16/2024     2:00 PM   ORAL CHEMOTHERAPY   Assessment Type Refill Other Chart Review Lab Monitoring Lab Monitoring Refill Discontinuation   Stop Date       1/15/2024   Reason for Discontinuation       Disease progression   Diagnosis Code Breast Cancer Breast Cancer Breast Cancer Breast Cancer Breast Cancer Breast Cancer Breast Cancer   Providers Dr. Dimitrios Plunkett   [facility]   Is this patient followed by the [facility] OC team? Yes Yes Yes Yes Yes Yes Yes   Drug Name Ibrance (palbociclib) Ibrance (palbociclib) Ibrance (palbociclib) Ibrance (palbociclib) Ibrance (palbociclib) Ibrance (palbociclib) Ibrance (palbociclib)   Dose 125 mg 125 mg 125 mg 125 mg 125 mg 125 mg 125 mg   Current Schedule Daily Daily Daily Daily Daily Daily Daily   Cycle Details 3 weeks on, 1 week off 3 weeks on, 1 week off 3 weeks on, 1 week off 3 weeks on, 1 week off 3 weeks on, 1 week off 3 weeks on, 1 week off 3 weeks on, 1 week off   Planned next cycle start date    11/2/2023 11/2/2023 11/30/2023    Doses missed in last 2 weeks  3        Adherence Assessment  Adherent        Adverse Effects  Nausea;Diarrhea        Nausea  Grade 1        Pharmacist Intervention(nausea)  No        Diarrhea  Grade 1        Pharmacist Intervention(diarrhea)  Yes        Intervention(s)  OTC recommendation        Any new drug interactions?  No        Is the dose as ordered appropriate for the patient?  Yes        Since the last time we  talked, have you been hospitalized or used the emergency room?  No            Last PHQ-2 Score on record:       10/4/2022    10:02 AM 5/13/2021     9:27 AM   PHQ-2 ( 1999 [employer])   Q1: Little interest or pleasure in doing things 3 1   Q2: Feeling down, depressed or hopeless 3 1   PHQ-2 Score 6 2   PHQ-2 Total Score (12-17 Years)- Positive if 3 or more points; Administer PHQ-A if positive  2       Vitals:  BP:   BP Readings from Last 1 Encounters:   01/15/24 (!) 147/94     Wt Readings from Last 1 Encounters:   01/15/24 107.7 kg (237 lb 6.4 oz)     Estimated body surface area is 2.34 meters squared as calculated from the following:    Height as of 1/2/24: 1.829 m (6').    Weight as of 1/15/24: 107.7 kg (237 lb 6.4 oz).    Labs:  _  Result Component Current Result Ref Range   Sodium 142 (1/15/2024) 135 - 145 mmol/L     _  Result Component Current Result Ref Range   Potassium 3.7 (1/15/2024) 3.4 - 5.3 mmol/L     _  Result Component Current Result Ref Range   Calcium 9.0 (1/15/2024) 8.6 - 10.0 mg/dL     No results found for Mag within last 30 days.     No results found for Phos within last 30 days.     _  Result Component Current Result Ref Range   Albumin 4.0 (1/15/2024) 3.5 - 5.2 g/dL     _  Result Component Current Result Ref Range   Urea Nitrogen 9.4 (1/15/2024) 6.0 - 20.0 mg/dL     _  Result Component Current Result Ref Range   Creatinine 0.85 (1/15/2024) 0.51 - 0.95 mg/dL     _  Result Component Current Result Ref Range   AST 16 (1/15/2024) 0 - 45 U/L     _  Result Component Current Result Ref Range   ALT 12 (1/15/2024) 0 - 50 U/L     _  Result Component Current Result Ref Range   Bilirubin Total 0.2 (1/15/2024) <=1.2 mg/dL     _  Result Component Current Result Ref Range   WBC Count 2.4 (L) (1/15/2024) 4.0 - 11.0 10e3/uL     _  Result Component Current Result Ref Range   Hemoglobin 10.5 (L) (1/15/2024) 11.7 - 15.7 g/dL     _  Result Component Current Result Ref Range   Platelet Count 170 (1/15/2024) 150 - 450  10e3/uL     _  Result Component Current Result Ref Range   Absolute Neutrophils 1.3 (L) (1/15/2024) 1.6 - 8.3 10e3/uL     No results found for ANC within last 30 days.        Assessment:  Patient is appropriate to start therapy.    Plan:  Basic chemotherapy teaching was reviewed with the patient including indication, start date of therapy, dose, administration, adverse effects, missed doses, food and drug interactions, monitoring, side effect management, office contact information, and safe handling. Written materials were provided and all questions answered.    Follow-Up:  Initial assessment 1/25     Evelio Park  Oncology Pharmacy Resident  January 18, 2024

## 2024-01-24 ENCOUNTER — PATIENT OUTREACH (OUTPATIENT)
Dept: CARE COORDINATION | Facility: CLINIC | Age: 49
End: 2024-01-24
Payer: MEDICARE

## 2024-01-24 NOTE — PROGRESS NOTES
Social Work - Telephone/[employer]  [facility]  Data:   Patient Name: Teresa Sanderson  Goes By: Teresa    /Age: 1975 (48 year old)      Referral Source: patient    Reason for Referral: FMLA paperwork/ [employer] paperwork    Intervention: Left voice message for patient on 2024 .   Plan:  will await patient's return phone call/message and provide assistance at that time.      Jo CORTEZ, [facility]  - Oncology  Phone : [phone]  Pager: [phone]

## 2024-01-25 ENCOUNTER — PATIENT OUTREACH (OUTPATIENT)
Dept: CARE COORDINATION | Facility: CLINIC | Age: 49
End: 2024-01-25
Payer: MEDICARE

## 2024-01-25 ENCOUNTER — TELEPHONE (OUTPATIENT)
Dept: ONCOLOGY | Facility: CLINIC | Age: 49
End: 2024-01-25
Payer: MEDICARE

## 2024-01-25 RX ORDER — LAMOTRIGINE 25 MG/1
500 TABLET ORAL ONCE
Status: CANCELLED | OUTPATIENT
Start: 2024-02-14 | End: 2024-02-14

## 2024-01-25 NOTE — ORAL ONC MGMT
Oral Chemotherapy Monitoring Program     Placed call to patient in follow up of oral chemotherapy. Called for initial assessment of abemaciclib; Teresa just started taking this yesterday. We reviewed dose and schedule, and will follow up in about a week to assess tolerance. She has no questions or concerns about it now.     Zahra Arellano, PharmD, [facility]  Hematology/Oncology Clinical Pharmacist  [facility]  [facility]

## 2024-01-26 ENCOUNTER — MYC MEDICAL ADVICE (OUTPATIENT)
Dept: PSYCHIATRY | Facility: CLINIC | Age: 49
End: 2024-01-26
Payer: MEDICARE

## 2024-02-01 ENCOUNTER — INFUSION THERAPY VISIT (OUTPATIENT)
Dept: ONCOLOGY | Facility: CLINIC | Age: 49
End: 2024-02-01
Attending: INTERNAL MEDICINE
Payer: MEDICARE

## 2024-02-01 ENCOUNTER — APPOINTMENT (OUTPATIENT)
Dept: LAB | Facility: CLINIC | Age: 49
End: 2024-02-01
Attending: INTERNAL MEDICINE
Payer: MEDICARE

## 2024-02-01 ENCOUNTER — MYC MEDICAL ADVICE (OUTPATIENT)
Dept: ONCOLOGY | Facility: CLINIC | Age: 49
End: 2024-02-01

## 2024-02-01 VITALS
HEART RATE: 87 BPM | WEIGHT: 236 LBS | RESPIRATION RATE: 16 BRPM | BODY MASS INDEX: 32.01 KG/M2 | TEMPERATURE: 97.9 F | DIASTOLIC BLOOD PRESSURE: 92 MMHG | SYSTOLIC BLOOD PRESSURE: 138 MMHG | OXYGEN SATURATION: 95 %

## 2024-02-01 DIAGNOSIS — C50.912 CARCINOMA OF LEFT BREAST METASTATIC TO BONE (H): ICD-10-CM

## 2024-02-01 DIAGNOSIS — Z17.0 MALIGNANT NEOPLASM OF OVERLAPPING SITES OF LEFT BREAST IN FEMALE, ESTROGEN RECEPTOR POSITIVE (H): Primary | ICD-10-CM

## 2024-02-01 DIAGNOSIS — C79.51 CARCINOMA OF LEFT BREAST METASTATIC TO BONE (H): ICD-10-CM

## 2024-02-01 DIAGNOSIS — C50.812 MALIGNANT NEOPLASM OF OVERLAPPING SITES OF LEFT BREAST IN FEMALE, ESTROGEN RECEPTOR POSITIVE (H): Primary | ICD-10-CM

## 2024-02-01 LAB
ALBUMIN SERPL BCG-MCNC: 3.9 G/DL (ref 3.5–5.2)
ALP SERPL-CCNC: 112 U/L (ref 40–150)
ALT SERPL W P-5'-P-CCNC: 23 U/L (ref 0–50)
ANION GAP SERPL CALCULATED.3IONS-SCNC: 8 MMOL/L (ref 7–15)
AST SERPL W P-5'-P-CCNC: 29 U/L (ref 0–45)
BASOPHILS # BLD AUTO: 0.1 10E3/UL (ref 0–0.2)
BASOPHILS NFR BLD AUTO: 2 %
BILIRUB SERPL-MCNC: 0.2 MG/DL
BUN SERPL-MCNC: 12 MG/DL (ref 6–20)
CALCIUM SERPL-MCNC: 9.5 MG/DL (ref 8.6–10)
CALCIUM SERPL-MCNC: 9.5 MG/DL (ref 8.6–10)
CHLORIDE SERPL-SCNC: 110 MMOL/L (ref 98–107)
CREAT SERPL-MCNC: 1.05 MG/DL (ref 0.51–0.95)
DEPRECATED HCO3 PLAS-SCNC: 24 MMOL/L (ref 22–29)
EGFRCR SERPLBLD CKD-EPI 2021: 65 ML/MIN/1.73M2
EOSINOPHIL # BLD AUTO: 0.1 10E3/UL (ref 0–0.7)
EOSINOPHIL NFR BLD AUTO: 2 %
ERYTHROCYTE [DISTWIDTH] IN BLOOD BY AUTOMATED COUNT: 15 % (ref 10–15)
GLUCOSE SERPL-MCNC: 101 MG/DL (ref 70–99)
HCT VFR BLD AUTO: 33 % (ref 35–47)
HGB BLD-MCNC: 11 G/DL (ref 11.7–15.7)
IMM GRANULOCYTES # BLD: 0 10E3/UL
IMM GRANULOCYTES NFR BLD: 1 %
LYMPHOCYTES # BLD AUTO: 1.8 10E3/UL (ref 0.8–5.3)
LYMPHOCYTES NFR BLD AUTO: 44 %
MCH RBC QN AUTO: 34.7 PG (ref 26.5–33)
MCHC RBC AUTO-ENTMCNC: 33.3 G/DL (ref 31.5–36.5)
MCV RBC AUTO: 104 FL (ref 78–100)
MONOCYTES # BLD AUTO: 0.2 10E3/UL (ref 0–1.3)
MONOCYTES NFR BLD AUTO: 5 %
NEUTROPHILS # BLD AUTO: 2 10E3/UL (ref 1.6–8.3)
NEUTROPHILS NFR BLD AUTO: 46 %
NRBC # BLD AUTO: 0 10E3/UL
NRBC BLD AUTO-RTO: 0 /100
PLATELET # BLD AUTO: 236 10E3/UL (ref 150–450)
POTASSIUM SERPL-SCNC: 4 MMOL/L (ref 3.4–5.3)
PROT SERPL-MCNC: 7.1 G/DL (ref 6.4–8.3)
RBC # BLD AUTO: 3.17 10E6/UL (ref 3.8–5.2)
SODIUM SERPL-SCNC: 142 MMOL/L (ref 135–145)
WBC # BLD AUTO: 4.1 10E3/UL (ref 4–11)

## 2024-02-01 PROCEDURE — 36415 COLL VENOUS BLD VENIPUNCTURE: CPT

## 2024-02-01 PROCEDURE — 85025 COMPLETE CBC W/AUTO DIFF WBC: CPT

## 2024-02-01 PROCEDURE — 250N000011 HC RX IP 250 OP 636: Mod: JZ | Performed by: INTERNAL MEDICINE

## 2024-02-01 PROCEDURE — 96365 THER/PROPH/DIAG IV INF INIT: CPT

## 2024-02-01 PROCEDURE — 80053 COMPREHEN METABOLIC PANEL: CPT

## 2024-02-01 PROCEDURE — 258N000003 HC RX IP 258 OP 636: Performed by: INTERNAL MEDICINE

## 2024-02-01 PROCEDURE — 82310 ASSAY OF CALCIUM: CPT | Performed by: INTERNAL MEDICINE

## 2024-02-01 PROCEDURE — 96402 CHEMO HORMON ANTINEOPL SQ/IM: CPT

## 2024-02-01 RX ORDER — LAMOTRIGINE 25 MG/1
500 TABLET ORAL ONCE
Status: COMPLETED | OUTPATIENT
Start: 2024-02-01 | End: 2024-02-01

## 2024-02-01 RX ORDER — ZOLEDRONIC ACID 0.04 MG/ML
4 INJECTION, SOLUTION INTRAVENOUS ONCE
Status: COMPLETED | OUTPATIENT
Start: 2024-02-01 | End: 2024-02-01

## 2024-02-01 RX ADMIN — ZOLEDRONIC ACID 4 MG: 0.04 INJECTION, SOLUTION INTRAVENOUS at 10:42

## 2024-02-01 RX ADMIN — SODIUM CHLORIDE 250 ML: 9 INJECTION, SOLUTION INTRAVENOUS at 10:41

## 2024-02-01 RX ADMIN — FULVESTRANT 500 MG: 50 INJECTION, SOLUTION INTRAMUSCULAR at 08:42

## 2024-02-01 NOTE — ORAL ONC MGMT
Oral Chemotherapy Monitoring Program  Lab Follow Up    Reviewed lab results from 2/1.        10/6/2023     8:00 AM 10/25/2023     4:00 PM 10/30/2023    12:00 PM 11/22/2023     8:00 AM 1/16/2024     2:00 PM 1/25/2024     1:00 PM 2/1/2024    10:00 AM   ORAL CHEMOTHERAPY   Assessment Type Chart Review Lab Monitoring Lab Monitoring Refill Discontinuation Initial Follow up Lab Monitoring   Stop Date     1/15/2024     Reason for Discontinuation     Disease progression     Diagnosis Code Breast Cancer Breast Cancer Breast Cancer Breast Cancer Breast Cancer Breast Cancer Breast Cancer   Providers Dr. Dimitrios Plunkett   [facility]   Is this patient followed by the [facility] OC team? Yes Yes Yes Yes Yes No No   Drug Name Ibrance (palbociclib) Ibrance (palbociclib) Ibrance (palbociclib) Ibrance (palbociclib) Ibrance (palbociclib) Verzenio (abemaciclib) Verzenio (abemaciclib)   Dose 125 mg 125 mg 125 mg 125 mg 125 mg 150 mg 150 mg   Current Schedule Daily Daily Daily Daily Daily BID BID   Cycle Details 3 weeks on, 1 week off 3 weeks on, 1 week off 3 weeks on, 1 week off 3 weeks on, 1 week off 3 weeks on, 1 week off Continuous Continuous   Start Date of Last Cycle      1/24/2024 1/24/2024   Planned next cycle start date  11/2/2023 11/2/2023 11/30/2023          Labs:  _  Result Component Current Result Ref Range   Sodium 142 (2/1/2024) 135 - 145 mmol/L     _  Result Component Current Result Ref Range   Potassium 4.0 (2/1/2024) 3.4 - 5.3 mmol/L     _  Result Component Current Result Ref Range   Calcium 9.5 (2/1/2024) 8.6 - 10.0 mg/dL    9.5 (2/1/2024) 8.6 - 10.0 mg/dL     No results found for Mag within last 30 days.     No results found for Phos within last 30 days.     _  Result Component Current Result Ref Range   Albumin 3.9 (2/1/2024) 3.5 - 5.2 g/dL     _  Result Component Current Result Ref Range    Urea Nitrogen 12.0 (2/1/2024) 6.0 - 20.0 mg/dL     _  Result Component Current Result Ref Range   Creatinine 1.05 (H) (2/1/2024) 0.51 - 0.95 mg/dL     _  Result Component Current Result Ref Range   AST 29 (2/1/2024) 0 - 45 U/L     _  Result Component Current Result Ref Range   ALT 23 (2/1/2024) 0 - 50 U/L     _  Result Component Current Result Ref Range   Bilirubin Total 0.2 (2/1/2024) <=1.2 mg/dL     _  Result Component Current Result Ref Range   WBC Count 4.1 (2/1/2024) 4.0 - 11.0 10e3/uL     _  Result Component Current Result Ref Range   Hemoglobin 11.0 (L) (2/1/2024) 11.7 - 15.7 g/dL     _  Result Component Current Result Ref Range   Platelet Count 236 (2/1/2024) 150 - 450 10e3/uL     _  Result Component Current Result Ref Range   Absolute Neutrophils 1.3 (L) (1/15/2024) 1.6 - 8.3 10e3/uL     _  Result Component Current Result Ref Range   Absolute Neutrophils 2.0 (2/1/2024) 1.6 - 8.3 10e3/uL        Assessment & Plan:  No concerning abnormalities.  Creatinine has risen, though this is nearly universally seen with Verzenio therapy.  Informed patient to remain well-hydrated.    [employer] message sent to patient, phone number on file has a disconnected signal when called.    Follow-Up:  2/15 appt/labs    Thank you,  Nick Thomas, PharmD  Oral Chemotherapy Monitoring Program  [phone]

## 2024-02-01 NOTE — PROGRESS NOTES
"Infusion Nursing Note:  Teresa Sanderson presents today for Cycle 1 Day 15 Faslodex + Zometa.    Patient spoke with provider today: No    Treatment Conditions:  Component      Latest Ref Rng 2/1/2024  8:17 AM   WBC      4.0 - 11.0 10e3/uL 4.1    RBC Count      3.80 - 5.20 10e6/uL 3.17 (L)    Hemoglobin      11.7 - 15.7 g/dL 11.0 (L)    Hematocrit      35.0 - 47.0 % 33.0 (L)    MCV      78 - 100 fL 104 (H)    MCH      26.5 - 33.0 pg 34.7 (H)    MCHC      31.5 - 36.5 g/dL 33.3    RDW      10.0 - 15.0 % 15.0    Platelet Count      150 - 450 10e3/uL 236    % Neutrophils      % 46    % Lymphocytes      % 44    % Monocytes      % 5    % Eosinophils      % 2    % Basophils      % 2    % Immature Granulocytes      % 1    NRBCs per 100 WBC      <1 /100 0    Absolute Neutrophils      1.6 - 8.3 10e3/uL 2.0    Absolute Lymphocytes      0.8 - 5.3 10e3/uL 1.8    Absolute Monocytes      0.0 - 1.3 10e3/uL 0.2    Absolute Eosinophils      0.0 - 0.7 10e3/uL 0.1    Absolute Basophils      0.0 - 0.2 10e3/uL 0.1    Absolute Immature Granulocytes      <=0.4 10e3/uL 0.0    Absolute NRBCs      10e3/uL 0.0    Sodium      135 - 145 mmol/L 142    Potassium      3.4 - 5.3 mmol/L 4.0    Carbon Dioxide (CO2)      22 - 29 mmol/L 24    Anion Gap      7 - 15 mmol/L 8    Urea Nitrogen      6.0 - 20.0 mg/dL 12.0    Creatinine      0.51 - 0.95 mg/dL 1.05 (H)    GFR Estimate      >60 mL/min/1.73m2 65    Calcium      8.6 - 10.0 mg/dL 9.5    Chloride      98 - 107 mmol/L 110 (H)    Glucose      70 - 99 mg/dL 101 (H)    Alkaline Phosphatase      40 - 150 U/L 112    AST      0 - 45 U/L 29    ALT      0 - 50 U/L 23    Protein Total      6.4 - 8.3 g/dL 7.1    Albumin      3.5 - 5.2 g/dL 3.9    Bilirubin Total      <=1.2 mg/dL 0.2       Legend:  (L) Low  (H) High      Note: Reports pain at 6/10 low back, which is not new for her. Wearing a \"pain patch that helps.\"  Declined further intervention for pain.  Tolerated bilateral ventral gluteal faslodex " injections without incident.      Intravenous Access:  Peripheral IV placed.    Post Infusion Assessment:  Patient tolerated infusion without incident.  Patient tolerated injection without incident.  Access discontinued per protocol.    Discharge Plan:   Patient declined prescription refills.  Discharge instructions reviewed with: Patient.  Patient and/or family verbalized understanding of discharge instructions and all questions answered.  Copy of AVS reviewed with patient and/or family.  Patient will return 2/15/24 for next appointment.  Patient discharged in stable condition accompanied by: son and daughter  Departure Mode: Ambulatory.    Valentine Jama RN

## 2024-02-01 NOTE — PATIENT INSTRUCTIONS
[facility] Triage and after hours / weekends / holidays:  [phone]    Please call the triage or after hours line if you experience a temperature greater than or equal to 100.4, shaking chills, have uncontrolled nausea, vomiting and/or diarrhea, dizziness, shortness of breath, chest pain, bleeding, unexplained bruising, or if you have any other new/concerning symptoms, questions or concerns.      If you are having any concerning symptoms or wish to speak to a provider before your next infusion visit, please call triage to notify them so we can adequately serve you.     If you need a refill on a narcotic prescription or other medication, please call before your infusion appointment.

## 2024-02-01 NOTE — NURSING NOTE
Chief Complaint   Patient presents with    Blood Draw     Labs drawn via PIV by RN in lab. VS taken.      Labs drawn via peripheral IV. Vital signs taken. Checked into next appointment.   Melany Solomon RN

## 2024-02-02 NOTE — PROGRESS NOTES
Social Work - Follow-Up  [facility]    Data/Intervention:    Patient Name: Teresa Sanderson Goes By: Teresa    /Age: 1975 (48 year old)    Reason for Follow-Up:  FMLA paperwork    Collaborated With:    -patient  -    Intervention/Education/Resources Provided:  Patient reported they had not heard back for psychiatry team regarding FMLA paperwork for their daughter to remain with patient to assist with their care.     SW reached out to psychiatry team who stated they had not received any paperwork. SW confirmed they had the correct email which was confirmed to be the correct contact information.    SW provided date when FMLA paperwork was emailed (1/3/24). Email was not found. SW made three more attempts to resend paperwork. Paperwork was still not received.     Psychiatry team coordinated to get paperwork, psychiatry confirmed on  they now have FMLA paperwork and will review it and follow up with patient.     SW informed psychiatry team of the time frame of FMLA paperwork needed and patient's need for a response as soon as possible.      SW provided patient with contact information of Abimbola Renteria RN  that works with Dr. Taylor patient's psychiatrist.     Patient dropped off county paperwork they were not sure how to proceed with. SW will review paperwork and follow up with patient regarding next steps.     Assessment/Plan:  Psychiatry team will follow up with patient.     Patient has contact information for psychiatry team to follow up with them directly as needed.     Previously provided patient/family with writer's contact information and availability.      Jo CORTEZ, [facility]  - Oncology  Phone : [phone]  Pager: [phone]

## 2024-02-05 ENCOUNTER — TELEPHONE (OUTPATIENT)
Dept: ONCOLOGY | Facility: CLINIC | Age: 49
End: 2024-02-05
Payer: MEDICARE

## 2024-02-05 NOTE — ORAL ONC MGMT
Oral Chemotherapy Monitoring Program    Primary Oncologist: Dr Plunkett  Drug: abemaciclib (Verzenio)  Start Date: 1/24/24      Subjective/Objective:  Teresa Sanderson is a 48 year old female contacted by phone for a follow-up visit for oral chemotherapy.  Pt confirms they are taking abemaciclib as directed. Pt couldn't confirm start date with me today (on prior call was reported as 1/24/24), but states she is in second box. Pt reports tolerating the medication well. Pt denies having any side effects so far.      Assessment/Plan:  Pt is currently tolerating therapy well. Plan to continue Verzenio as prescribed. Pt verbalized understanding and agrees to plan. Encouraged pt to call with any issues or concerns.    Follow-Up:  2/15: labs and Alee WILL visit    Refill Due:  Prior to 2/21/24    Pearl Garcia PharmD  Oral Chemotherapy Monitoring Program  [facility]  [phone]  February 5, 2024              10/25/2023     4:00 PM 10/30/2023    12:00 PM 11/22/2023     8:00 AM 1/16/2024     2:00 PM 1/25/2024     1:00 PM 2/1/2024    10:00 AM 2/5/2024     9:00 AM   ORAL CHEMOTHERAPY   Assessment Type Lab Monitoring Lab Monitoring Refill Discontinuation Initial Follow up Lab Monitoring Initial Follow up   Stop Date    1/15/2024      Reason for Discontinuation    Disease progression      Diagnosis Code Breast Cancer Breast Cancer Breast Cancer Breast Cancer Breast Cancer Breast Cancer Breast Cancer   Providers Dimitrios Plunkett   [facility]   Is this patient followed by the [facility] OC team? Yes Yes Yes Yes No No No   Drug Name Ibrance (palbociclib) Ibrance (palbociclib) Ibrance (palbociclib) Ibrance (palbociclib) Verzenio (abemaciclib) Verzenio (abemaciclib) Verzenio (abemaciclib)   Dose 125 mg 125 mg 125 mg 125 mg 150 mg 150 mg 150 mg   Current Schedule Daily Daily Daily Daily BID  BID BID   Cycle Details 3 weeks on, 1 week off 3 weeks on, 1 week off 3 weeks on, 1 week off 3 weeks on, 1 week off Continuous Continuous Continuous   Start Date of Last Cycle     1/24/2024 1/24/2024 1/24/2024   Planned next cycle start date 11/2/2023 11/2/2023 11/30/2023       Adverse Effects       No AE identified during assessment   Is the dose as ordered appropriate for the patient?       Yes       Vitals:  BP:   BP Readings from Last 1 Encounters:   02/01/24 (!) 138/92     Wt Readings from Last 1 Encounters:   02/01/24 107 kg (236 lb)     Estimated body surface area is 2.33 meters squared as calculated from the following:    Height as of 1/2/24: 1.829 m (6').    Weight as of 2/1/24: 107 kg (236 lb).    Labs:  _  Result Component Current Result Ref Range   Sodium 142 (2/1/2024) 135 - 145 mmol/L     _  Result Component Current Result Ref Range   Potassium 4.0 (2/1/2024) 3.4 - 5.3 mmol/L     _  Result Component Current Result Ref Range   Calcium 9.5 (2/1/2024) 8.6 - 10.0 mg/dL    9.5 (2/1/2024) 8.6 - 10.0 mg/dL     No results found for Mag within last 30 days.     No results found for Phos within last 30 days.     _  Result Component Current Result Ref Range   Albumin 3.9 (2/1/2024) 3.5 - 5.2 g/dL     _  Result Component Current Result Ref Range   Urea Nitrogen 12.0 (2/1/2024) 6.0 - 20.0 mg/dL     _  Result Component Current Result Ref Range   Creatinine 1.05 (H) (2/1/2024) 0.51 - 0.95 mg/dL       _  Result Component Current Result Ref Range   AST 29 (2/1/2024) 0 - 45 U/L     _  Result Component Current Result Ref Range   ALT 23 (2/1/2024) 0 - 50 U/L     _  Result Component Current Result Ref Range   Bilirubin Total 0.2 (2/1/2024) <=1.2 mg/dL       _  Result Component Current Result Ref Range   WBC Count 4.1 (2/1/2024) 4.0 - 11.0 10e3/uL     _  Result Component Current Result Ref Range   Hemoglobin 11.0 (L) (2/1/2024) 11.7 - 15.7 g/dL     _  Result Component Current Result Ref Range   Platelet Count 236 (2/1/2024)  150 - 450 10e3/uL     _  Result Component Current Result Ref Range   Absolute Neutrophils 1.3 (L) (1/15/2024) 1.6 - 8.3 10e3/uL

## 2024-02-06 ENCOUNTER — VIRTUAL VISIT (OUTPATIENT)
Dept: PALLIATIVE CARE | Facility: CLINIC | Age: 49
End: 2024-02-06
Attending: STUDENT IN AN ORGANIZED HEALTH CARE EDUCATION/TRAINING PROGRAM
Payer: MEDICARE

## 2024-02-06 ENCOUNTER — PATIENT OUTREACH (OUTPATIENT)
Dept: CARE COORDINATION | Facility: CLINIC | Age: 49
End: 2024-02-06
Payer: MEDICARE

## 2024-02-06 VITALS — BODY MASS INDEX: 31.83 KG/M2 | WEIGHT: 235 LBS | HEIGHT: 72 IN

## 2024-02-06 DIAGNOSIS — C79.51 METASTASIS TO BONE (H): ICD-10-CM

## 2024-02-06 DIAGNOSIS — G89.3 CANCER ASSOCIATED PAIN: ICD-10-CM

## 2024-02-06 DIAGNOSIS — Z17.0 MALIGNANT NEOPLASM OF OVERLAPPING SITES OF LEFT BREAST IN FEMALE, ESTROGEN RECEPTOR POSITIVE (H): Primary | ICD-10-CM

## 2024-02-06 DIAGNOSIS — M79.661 PAIN OF RIGHT LOWER LEG: ICD-10-CM

## 2024-02-06 DIAGNOSIS — M54.9 BACK PAIN WITH HISTORY OF SPINAL SURGERY: ICD-10-CM

## 2024-02-06 DIAGNOSIS — Z98.890 BACK PAIN WITH HISTORY OF SPINAL SURGERY: ICD-10-CM

## 2024-02-06 DIAGNOSIS — C50.812 MALIGNANT NEOPLASM OF OVERLAPPING SITES OF LEFT BREAST IN FEMALE, ESTROGEN RECEPTOR POSITIVE (H): Primary | ICD-10-CM

## 2024-02-06 DIAGNOSIS — M62.838 MUSCLE SPASM: ICD-10-CM

## 2024-02-06 DIAGNOSIS — Z51.5 ENCOUNTER FOR PALLIATIVE CARE: ICD-10-CM

## 2024-02-06 PROCEDURE — 99214 OFFICE O/P EST MOD 30 MIN: CPT | Mod: 95 | Performed by: STUDENT IN AN ORGANIZED HEALTH CARE EDUCATION/TRAINING PROGRAM

## 2024-02-06 RX ORDER — METHOCARBAMOL 500 MG/1
TABLET, FILM COATED ORAL 3 TIMES DAILY PRN
Qty: 210 TABLET | Refills: 2 | Status: SHIPPED | OUTPATIENT
Start: 2024-02-06 | End: 2024-05-09

## 2024-02-06 RX ORDER — GABAPENTIN 300 MG/1
CAPSULE ORAL
Qty: 210 CAPSULE | Refills: 1 | Status: SHIPPED | OUTPATIENT
Start: 2024-02-06 | End: 2024-05-09

## 2024-02-06 RX ORDER — BUPRENORPHINE 20 UG/H
1 PATCH TRANSDERMAL
Qty: 4 PATCH | Refills: 2 | Status: SHIPPED | OUTPATIENT
Start: 2024-02-06 | End: 2024-06-24

## 2024-02-06 ASSESSMENT — PAIN SCALES - GENERAL: PAINLEVEL: SEVERE PAIN (7)

## 2024-02-06 NOTE — PATIENT INSTRUCTIONS
Recommendations:  -We are increasing the Butrans patch to 20 mcg.  You will change it the same way you did the lower dose.  I think it is okay to take a dose of Tylenol first thing in the morning to help you get up and move around.  I would check with oncology next week about  -How much Tylenol they are comfortable with you taking in a day related to the risk of masking a fever.  -Increase nighttime gabapentin to 3 capsules to see if this helps some with hot flashes as well as pain.  -Okay to continue the muscle relaxer with 2 pills in the morning and midday and 3 pills at night.  You can take up to 3 pills 3 times a day.  -Check [employer] for the message from Jo.    Follow up: About 2 months      Reasons to Call    If you are having worsening/uncontrolled symptoms we want you to call!    You or your other physicians make any changes to medications we have prescribed.  -Please call for refills 4-5 days before you will run out of medication.    Important Phone Numbers, including: Refills, scheduling, and general questions     Palliative Care RN: Aisha Shelton - [phone]  *After hours or on weekends. Will connect you with on call MD. [phone]

## 2024-02-06 NOTE — PROGRESS NOTES
Social Work - Telephone/MyChart message  [facility]  Data:   Patient Name: Teresa Sanderson  Goes By: Teresa    /Age: 1975 (48 year old)      Referral Source: patient    Reason for Referral: [facility] paperwork, [facility] paperwork    Intervention: Left voice message for patient on 2024 and Sent patient MyChart message on 2024 .   Plan:  will await patient's return phone call/message and provide assistance at that time.      Jo CORTEZ, [facility]  - Oncology  Phone : [phone]  Pager: [phone]

## 2024-02-06 NOTE — LETTER
2/6/2024       RE: Teresa Sanderson  [address] [facility] [address]       Dear Colleague,    Thank you for referring your patient, [facility] Sanderson[facility]. Please see a copy of my visit note below.    Palliative Care Progress Note    Patient Name: Teresa Sanderson  Primary Provider: No Ref-Primary, Physician    Chief Complaint/Patient ID:   Medical - She has metastatic breast cancer dx 12/2020; widespread bone mets. S/p RT to lumbar spine and RFA/kyphoplasty L4 5/2021.   11/2022 on Ibrance and anastrazole since 1/2021 (with some treatment interruptions). Long discussion about voluntary opioid tapering 11/15/22 palliative visit.      She has schizoaffective disorder (bipolar type); followed by psychiatry at the .     -Currently on treatment with palbociclib and exemestane. PET/CT in 04/2023 showed disease progression in two small bone metastasis in L pelvis, S/p 5 fractions RT to these lesions.   -Some lapses/breaks in treatment due to complicated social situation.  -Evidence of PD on 12/2023 PET scan.  Changed to Verzenio 01/2024.    Last Palliative care appointment: 1/2/24 with me. Increased butrans patch and Robaxin.     Reviewed: Yes    Interim History:  Teresa Sanderson is a 48 year old female who is seen today for follow up with Palliative Care via billable video visit.     Pain in her back is about the same and still bothersome.  With the change in treatment, she stopped taking all ibuprofen and Tylenol because she read online that you should not mix with the Verzenio due to fear of masking a fever.  This is made her more uncomfortable, particularly in the mornings.  Is also having more pain in her left breast, particularly burning.  Per chart review, it looks like she discussed this with Dr. Plunkett at her January 15 appointment (note reviewed).    She did find the increased dose of Robaxin  helpful.  Currently taking 1000 mg twice daily and 1500 mg before bed.    Having more hot flashes lately with the treatment change.    Otherwise tolerating Verzenio fairly well.  Says she had a couple small episodes of diarrhea that responded to Imodium.    Still has some additional financial and resource questions.  Has been playing phone tag with oncology social work.     Social History:  Lives with cousin and son. On SSD. 5 adult children. Housing insecure. Daughter murdered in . Son  Spring 2022. Worked as a  for [employer] before cancer dx, in [place].   No LOYD hx  Social History     Tobacco Use    Smoking status: Former     Packs/day: .25     Types: Cigarettes     Start date: 2011     Passive exposure: Never    Smokeless tobacco: Never   Substance Use Topics    Alcohol use: Not Currently     Comment: occ    Drug use: Yes     Types: Marijuana     Comment: occ     Family History- Reviewed in Epic.    Medications- Reviewed in Epic.    Past Medical History- Reviewed in [facility].    Past Surgical History- Reviewed in Epic.    Physical Exam:   Constitutional: Alert, pleasant, no apparent distress. Sitting up in chair.  Eyes: Sclera non-icteric, no eye discharge.  ENT: No nasal discharge. Ears grossly normal.  Respiratory: Unlabored respirations. Speaking in full sentences.  Musculoskeletal: Extremities appear normal- no gross deformities noted. No edema noted on upper body.   Skin: No suspicious lesions or rashes on visible skin.  Neurologic: Clear speech, no aphasia. No facial droop.  Psychiatric: Mentation appears normal, appropriate attention. Affect normal/bright. Does not appear anxious or depressed. Rocking back and forth.    Key Data Reviewed:  LABS: 24- Cr 1.05, GFR 65, Albumin 3.9, LFTs wnl. WBC 4.1, Hgb 11, Plts 236.     Impression & Recommendations & Counseling:  Teresa Sanderson is a 48 year old female with history of metastatic breast cancer.     Cancer related pain:  Pain  control continues to be an ongoing concern.  Plan to increase Butrans patch to 20 mcg q. 7 days today.  I asked her to let me know how things are going in 1 to 2 weeks, as we can adjust further.  We could consider additional patches to go up on the dose.     Discussed with her I think she can take 1 dose of Tylenol in the morning, as she has not been having any risks of fevers at this point in time.  She has follow-up with oncology next week and can discuss ongoing usage of Tylenol or ibuprofen.     Increase the range on her methocarbamol to allow for up to 1500 mg 3 times daily.  She did find the last increase helpful.    Bowels - Couple episodes of diarrhea.  Continue Imodium PRN.    Hot flashes - 2/2 treatment.  Will trial increasing bedtime dose of gabapentin to 900 mg to see if this is helpful.  Dosing will now be 600/600/900 mg.      Follow up: 2 to 3 months      Total time spent on day of encounter is 37 mins, including reviewing record, review of above studies, above visit with patient, symptomatic discussion as above, including medication adjustments/prescription management, and documentation.       Some chart documentation performed using Dragon Voice recognition Software. Although reviewed after completion, some words and grammatical errors may remain.        Again, thank you for allowing me to participate in the care of your patient.      Sincerely,    Ayanna Tellez, DO

## 2024-02-06 NOTE — PROGRESS NOTES
Palliative Care Progress Note    Patient Name: Teresa Sanderson  Primary Provider: No Ref-Primary, Physician    Chief Complaint/Patient ID:   Medical - She has metastatic breast cancer dx 12/2020; widespread bone mets. S/p RT to lumbar spine and RFA/kyphoplasty L4 5/2021.   11/2022 on Ibrance and anastrazole since 1/2021 (with some treatment interruptions). Long discussion about voluntary opioid tapering 11/15/22 palliative visit.      She has schizoaffective disorder (bipolar type); followed by psychiatry at the .     -Currently on treatment with palbociclib and exemestane. PET/CT in 04/2023 showed disease progression in two small bone metastasis in L pelvis, S/p 5 fractions RT to these lesions.   -Some lapses/breaks in treatment due to complicated social situation.  -Evidence of PD on 12/2023 PET scan.  Changed to Verzenio 01/2024.    Last Palliative care appointment: 1/2/24 with me. Increased butrans patch and Robaxin.     Reviewed: Yes    Interim History:  Teresa Sanderson is a 48 year old female who is seen today for follow up with Palliative Care via billable video visit.     Pain in her back is about the same and still bothersome.  With the change in treatment, she stopped taking all ibuprofen and Tylenol because she read online that you should not mix with the Verzenio due to fear of masking a fever.  This is made her more uncomfortable, particularly in the mornings.  Is also having more pain in her left breast, particularly burning.  Per chart review, it looks like she discussed this with Dr. Plunkett at her January 15 appointment (note reviewed).    She did find the increased dose of Robaxin helpful.  Currently taking 1000 mg twice daily and 1500 mg before bed.    Having more hot flashes lately with the treatment change.    Otherwise tolerating Verzenio fairly well.  Says she had a couple small episodes of diarrhea that responded to Imodium.    Still has some additional financial and resource  questions.  Has been playing phone tag with oncology social work.     Social History:  Lives with cousin and son. On SSD. 5 adult children. Housing insecure. Daughter murdered in . Son  Spring 2022. Worked as a  for [employer] before cancer dx, in [place].   No LOYD hx  Social History     Tobacco Use    Smoking status: Former     Packs/day: .25     Types: Cigarettes     Start date: 2011     Passive exposure: Never    Smokeless tobacco: Never   Substance Use Topics    Alcohol use: Not Currently     Comment: occ    Drug use: Yes     Types: Marijuana     Comment: occ     Family History- Reviewed in Epic.    Medications- Reviewed in Epic.    Past Medical History- Reviewed in [facility].    Past Surgical History- Reviewed in Epic.    Physical Exam:   Constitutional: Alert, pleasant, no apparent distress. Sitting up in chair.  Eyes: Sclera non-icteric, no eye discharge.  ENT: No nasal discharge. Ears grossly normal.  Respiratory: Unlabored respirations. Speaking in full sentences.  Musculoskeletal: Extremities appear normal- no gross deformities noted. No edema noted on upper body.   Skin: No suspicious lesions or rashes on visible skin.  Neurologic: Clear speech, no aphasia. No facial droop.  Psychiatric: Mentation appears normal, appropriate attention. Affect normal/bright. Does not appear anxious or depressed. Rocking back and forth.    Key Data Reviewed:  LABS: 24- Cr 1.05, GFR 65, Albumin 3.9, LFTs wnl. WBC 4.1, Hgb 11, Plts 236.     Impression & Recommendations & Counseling:  Teresa Sanderson is a 48 year old female with history of metastatic breast cancer.     Cancer related pain:  Pain control continues to be an ongoing concern.  Plan to increase Butrans patch to 20 mcg q. 7 days today.  I asked her to let me know how things are going in 1 to 2 weeks, as we can adjust further.  We could consider additional patches to go up on the dose.     Discussed with her I think she can take 1 dose of  Tylenol in the morning, as she has not been having any risks of fevers at this point in time.  She has follow-up with oncology next week and can discuss ongoing usage of Tylenol or ibuprofen.     Increase the range on her methocarbamol to allow for up to 1500 mg 3 times daily.  She did find the last increase helpful.    Bowels - Couple episodes of diarrhea.  Continue Imodium PRN.    Hot flashes - 2/2 treatment.  Will trial increasing bedtime dose of gabapentin to 900 mg to see if this is helpful.  Dosing will now be 600/600/900 mg.      Follow up: 2 to 3 months    Video-Visit Details  Video Start Time: 11:30AM  Video End Time: 11:58 AM    Originating Location (pt. Location): Home     Distant Location (provider location):  Offsite- Personal Home     Platform used for Video Visit: Chad     Total time spent on day of encounter is 37 mins, including reviewing record, review of above studies, above visit with patient, symptomatic discussion as above, including medication adjustments/prescription management, and documentation.     Ayanna Tellez,   Palliative Medicine   [facility] [place] [id number]    Some chart documentation performed using Dragon Voice recognition Software. Although reviewed after completion, some words and grammatical errors may remain.

## 2024-02-06 NOTE — NURSING NOTE
Is the patient currently in the state of [place]? YES    Visit mode:VIDEO    If the visit is dropped, the patient can be reconnected by: VIDEO VISIT: Text to cell phone:   Telephone Information:   Mobile [phone]       Will anyone else be joining the visit? NO  (If patient encounters technical issues they should call [phone]    How would you like to obtain your AVS? MyChart    Are changes needed to the allergy or medication list? Yes Pt states she no longer takes Ondansetron or Ibrance.    Reason for visit: RECHECK    Jorge CUELLAR

## 2024-02-09 ENCOUNTER — VIRTUAL VISIT (OUTPATIENT)
Dept: PSYCHIATRY | Facility: CLINIC | Age: 49
End: 2024-02-09
Attending: PSYCHIATRY & NEUROLOGY
Payer: MEDICARE

## 2024-02-09 ENCOUNTER — CARE COORDINATION (OUTPATIENT)
Dept: PSYCHIATRY | Facility: CLINIC | Age: 49
End: 2024-02-09
Payer: MEDICARE

## 2024-02-09 VITALS — HEIGHT: 72 IN | BODY MASS INDEX: 31.15 KG/M2 | WEIGHT: 230 LBS

## 2024-02-09 DIAGNOSIS — F25.0 SCHIZOAFFECTIVE DISORDER, BIPOLAR TYPE (H): ICD-10-CM

## 2024-02-09 PROCEDURE — 99214 OFFICE O/P EST MOD 30 MIN: CPT | Mod: 95

## 2024-02-09 RX ORDER — QUETIAPINE FUMARATE 50 MG/1
50-100 TABLET, FILM COATED ORAL 2 TIMES DAILY PRN
Qty: 45 TABLET | Refills: 2 | Status: SHIPPED | OUTPATIENT
Start: 2024-02-09 | End: 2024-02-23

## 2024-02-09 RX ORDER — QUETIAPINE FUMARATE 400 MG/1
400 TABLET, FILM COATED ORAL AT BEDTIME
Qty: 30 TABLET | Refills: 2 | Status: SHIPPED | OUTPATIENT
Start: 2024-02-09 | End: 2024-06-21

## 2024-02-09 ASSESSMENT — PATIENT HEALTH QUESTIONNAIRE - PHQ9
10. IF YOU CHECKED OFF ANY PROBLEMS, HOW DIFFICULT HAVE THESE PROBLEMS MADE IT FOR YOU TO DO YOUR WORK, TAKE CARE OF THINGS AT HOME, OR GET ALONG WITH OTHER PEOPLE: SOMEWHAT DIFFICULT
SUM OF ALL RESPONSES TO PHQ QUESTIONS 1-9: 19
SUM OF ALL RESPONSES TO PHQ QUESTIONS 1-9: 19

## 2024-02-09 ASSESSMENT — PAIN SCALES - GENERAL: PAINLEVEL: SEVERE PAIN (6)

## 2024-02-09 NOTE — NURSING NOTE
Is the patient currently in the state of [place]? YES    Visit mode:VIDEO    If the visit is dropped, the patient can be reconnected by: VIDEO VISIT: Text to cell phone:   Telephone Information:   Mobile [phone]       Will anyone else be joining the visit? NO  (If patient encounters technical issues they should call [phone]    How would you like to obtain your AVS? MyChart    Are changes needed to the allergy or medication list? No    Reason for visit: RECHECK    Wendy CUELLAR

## 2024-02-09 NOTE — PATIENT INSTRUCTIONS
**For crisis resources, please see the information at the end of this document**   Patient Education    Thank you for coming to the [facility] [place] [facility].     Lab Testing:  If you had lab testing today and your results are reassuring or normal they will be mailed to you or sent through [place] within 7 days. If the lab tests need quick action we will call you with the results. The phone number we will call with results is # [phone]. If this is not the best number please call our clinic and change the number.     Medication Refills:  If you need any refills please call your pharmacy and they will contact us. Our fax number for refills is [phone].   Three business days of notice are needed for general medication refill requests.   Five business days of notice are needed for controlled substance refill requests.   If you need to change to a different pharmacy, please contact the new pharmacy directly. The new pharmacy will help you get your medications transferred.     Contact Us:  Please call [phone] during business hours (8-5:00 M-F).   If you have medication related questions after clinic hours, or on the weekend, please call [phone].     Financial Assistance [phone]   Medical Records [phone]       MENTAL HEALTH CRISIS RESOURCES:  For a emergency help, please call 911 or go to the nearest Emergency Department.     Emergency Walk-In Options:   Em[facility] Unit @ [facility] ([place]): [phone] - Specialized mental health emergency area designed to be calming  [facility] ([place]): [phone]  [facility] ([place]): [phone]  [facility]): [phone]    [facility] Crisis Information:   [place]: [phone]  [place]: [phone]  [place] ([place]) - Adult: [phone]     Child: [phone]  [place] - Adult: [phone]     Child: [phone]  [place]:  [phone]  List of all [facility] resources:   https://[place].gov/dhs/people-we-serve/adults/health-care/mental-health/resources/crisis-contacts.jsp    National Crisis Information:   Crisis Text Line: Text  [place]  to 597840  Suicide & Crisis Lifeline: 988  National Suicide Prevention Lifeline: 9-053-013-TALK (1-908.515.6552)       For online chat options, visit https://suicidepreventionlifeline.org/chat/  Poison Control Center: 7-986-218-1479  Trans Lifeline: 2-418-572-8015 - Hotline for transgender people of all ages  The [employer] Project: 3-976-714-4351 - Hotline for LGBT youth     For Non-Emergency Support:   Fast Tracker: Mental Health & Substance Use Disorder Resources -   https://www.SureGeneckPROTEGOn.org/

## 2024-02-09 NOTE — PROGRESS NOTES
Virtual Visit Details    Type of service:  Video Visit   Video Start Time: 3:31 PM  Video End Time: 4:02 PM    Originating Location (pt. Location): Home    Distant Location (provider location):  On-site  Platform used for Video Visit: [facility]  MEDICAL PROGRESS NOTE     CARE TEAM:    PCP- Physician No Ref-Primary  Therapist- None    Teresa is a 48 year old who uses the pronouns she, her, hers.      Diagnoses     Schizoaffective disorder, bipolar type (previously diagnosed with bipolar disorder), MRE depressed,     Rule out hypomania    Bereavement  Metastatic breast cancer     Assessment     Teresa was seen today for follow-up and ongoing medication management. Issues discussed are included below:    - Depression/Anxiety: Depression and anxiety are relatively stable, but have been less of a focus since the acute onset of sleep dysregulation and energy change recently. See below. Will plan to address depressive an anxious symptoms more directly at future appointment. Due to concern for possible developing tom, will decrease sertraline to 50mg until able to resolve current episode.    - Schizoaffective Disorder: Has continued taking her Seroquel for sleep and symptom management per her report, but has now been experiencing array of symptoms concerning for developing tom/hypomania symptoms given increased rate of speech, acute sleep dysregulation, internal racing feeling. Possible that some degree of sleep dysregulation could be related to current chemotherapeutic regimen, but the degree of association is uncertain. Will need to discuss with oncologic providers for further discussion. For now, will increase Seroquel to 400mg and instructions that if still experiencing sleep dysregulation to take additional 100mg to achieve better symptom management. Plan to follow-up closely for management. Instructed Teresa to contact  the clinic directly or to go to the emergency department should symptoms worsen prior to her follow-up appointment.     Future Considerations:  - Consider switching Seroquel to alternative SGA if no improvement with dose optimization  - Increase in sertraline    Psychotropic Drug Interactions:  [PSYCHCLINICDDI]  ADDITIVE SEDATION: Butrans, Robaxin, Seroquel  Management: Routine Monitoring, refer for MTM  for evaluation for DDI with full array of medications    [place] was not [place] today:  writer not prescribing controlled substances at this time    Risk Statements:   Treatment Risk- Risks, benefits, alternatives and potential adverse effects have been discussed and are understood.   Safety Risk-Cleaster did not appear to be an imminent safety risk to self or others.     Plan     1) Medications:   - increase schedule Seroquel to 400mg at bedtime   - increase PRN Seroquel to 1-2 tablets ( mg) by mouth 2 times daily as needed May take up to 2 tablets (100mg) in addition to 400mg tablet at bedtime for total dose of 500mg   - decrease sertraline to 50mg daily      Prescribed by Heme Onc/Other Providers:  - Butrans 5mcg/hr wk patch  - Gabapentin 600 mg in the morning, 600 mg at 2 pm and 600 mg at bedtime  - Robaxin 500 mg TID PRN for muscle spasms  - Rivaroxaban 20mg tablet daily    2) Psychotherapy: Writer to contact prior therapist about re-establishing vs referral to new provider for individual therapy    3) Next due:  Labs- SGA monitoring labs due as of 01/2024  EKG- Routine monitoring is not indicated for current psychotropic medication regimen   Rating scales- AIMS: due at next in-person appointment    4) Referrals: MTM- for review of full regimen of medications given ongoing cancer diagnosis and complexities in treatment    5) Other: none    6) Follow-up: Return to clinic in 4 weeks     Pertinent Background                                                   [most recent eval 12/04/23]   Medical: Diagnosed  "with L breast cancer in Dec 2020 after presenting with a few mos of back pain. Mets to L4, L5, left iliac bone as well as paraspinal mass. 2021 started Ibrance, zoladex, and anastrozole; 21- s/p radiofrequency ablation, keloplasty/ segmental plasty of L4; 2021- showing complete response to treatment.  Psych: Lifetime history of \"rapid mood shifts\". S/[place] 7-8 hospital[place], [place] [facility] [place]quel started at that time, took 100mg TID x yrs. Details in eval from said period. Hospitalization [id number] is discussed in eval and documents what sounds like a full manic episode. CA dx'd 2020. Zoloft started after the cancer dx. Seroquel 100mg was stopped 2021 (not helping), doxepin and Ambien tried for sleep-neither helped. Son  2022 by accidental overdose.   Meds: Seroquel x years 100mg-300mg was helpful but 300mg too sedating & became  less effective over time; Invega for a short period; Zyprexa was helpful; no Haldol or Risperdal; Lithium did not help (? 2 yrs ago) same for Depakote; trazodone; no HOWELL     Pertinent Items Include: suicidal ideation, psychosis, tom, mutiple   psychotropic trials, trauma hx, violent behavior, psych hosps, cocaine . Last hosp  2021.     Subjective     Notes that she has been up since yesterday and feeling more talkative and energetic. This has been on and off over the last couple of days. Feels like her mind is \"overloaded\" right now. This isn't the first time she has felt like this, but does feel like this is out of character for her. Called her friend at 3AM and talked to her for over 3 hours. \"It's been a minute\" since she last time she felt this way. Is unsure if this feels like when she has had tom in the past, but affirms that it is distressing for her and she is concerned.    Now taking Verzenio, Aromasin, as well as her Cycle 1 of Faslodex + Zometa for cancer treatment.    Needs the [place] start date changed " "to 12/14/2023.    No reported SI/HI or other safety concerns at this time. Has her daughter with her now for additional support in managing her healthcare needs at present.    Recent Psych Symptoms:   Depression:  depressed mood, low energy, insomnia, feeling hopeless, excessive crying, overwhelmed, and mood dysregulation  Elevated:  increased energy, decreased sleep need, increased activity, distractibility , and racing thoughts  Psychosis:  auditory hallucinations  Anxiety:  feeling fearful and nervous/overwhelmed  Trauma Related:   Medical trauma/stressors, anxiety increased when thinking about medical procedures and results  Insomnia:  Yes: intermittent difficulty with both initiation and maintenance due to worries at night  Other:  No    Current Social History:  Financial/occupational: on disability for finances currently  Living situation (partner, children, pets, etc): Rents an apartment, son has a room with her but doesn't stay there often, 1 cat (\"Toejoe\")  Social/spiritual support: Prayer is important, best friend in [place], sister in [place], son  Feels safe at home: Yes    Pertinent Substance Use:   Alcohol: Rarely, maybe once every other month   Cannabis: Infrequently - 1-2x per month for refractory pain  Tobacco: Yes: about 6-7 (often doesn't finish them)  Caffeine:  Yes: about 4-5 cans of coke per day, 1 cup of coffee in morning - recently reducing this intake  Opioids: Yes: prescribed   Narcan Kit current: Yes  Other substances: none    Medical Review of Systems:   Lightheadedness/orthostasis: None  Headaches: Very rare tension headaches  GI: none  Sexual health concerns: None    A comprehensive review of systems was performed and is negative other than noted above.    Contraception: \"tubes are tied\"     Mental Status Exam     Alertness: alert  and oriented  Appearance: adequately groomed  Behavior/Demeanor: cooperative, pleasant, and calm, with good  eye contact   Speech: normal and regular rate " and rhythm  Language: intact and no problems  Psychomotor:  Persistent rocking while seated  Mood: depressed and anxious  Affect: restricted and anxious ; congruent to: mood- yes, content- yes  Thought Process/Associations: unremarkable  Thought Content:  Reports none;  Denies suicidal & violent ideation and delusions and paranoid ideation  Perception:  Reports auditory hallucinations without commands [details in history];  Denies visual hallucinations  Insight: fair - open to review and discussion of symptoms across diagnosis, but some difficulty with specific identification of symptoms potentially related to schizoaffective disorder  Judgment: fair  Cognition: does  appear grossly intact; formal cognitive testing was not done  Gait and Station: N/A (telehealth)     Past Psych Med Trials      Medication Max Dose (mg) Dates / Duration Helpful? DC Reason / Adverse Effects?   Seroquel 300mg  yes    Zoloft 450mg  yes    Gabapentin 600mg TID  ?    Olanzapine 5mg BID      Depakote   no    lithium   no    Invega    Only brief trial   Ambien          Vitals   There were no vitals taken for this visit.  Pulse Readings from Last 3 Encounters:   02/01/24 87   01/18/24 62   01/15/24 57     Wt Readings from Last 3 Encounters:   02/06/24 106.6 kg (235 lb)   02/01/24 107 kg (236 lb)   01/15/24 107.7 kg (237 lb 6.4 oz)     BP Readings from Last 3 Encounters:   02/01/24 (!) 138/92   01/18/24 (!) 153/101   01/15/24 (!) 147/94        Medical History     ALLERGIES: Contrast dye    Patient Active Problem List   Diagnosis    Breast mass    Spine metastasis    Urinary tract infection with hematuria    Malignant neoplasm of overlapping sites of left breast in female, estrogen receptor positive (H)    Carcinoma of left breast metastatic to bone (H)    Metastasis to bone (H)    Pain of right lower leg    Malignant neoplasm of female breast, unspecified estrogen receptor status, unspecified laterality, unspecified site of breast (H)     Schizoaffective disorder, bipolar type (H)    Insomnia, unspecified type        Medications     Current Outpatient Medications   Medication Sig Dispense Refill    abemaciclib (VERZENIO) 150 MG tablet Take 1 tablet (150 mg) by mouth 2 times daily for 28 days 56 tablet 0    acetaminophen (TYLENOL) 500 MG tablet Take 500-1,000 mg by mouth every 6 hours as needed for mild pain      albuterol (PROAIR HFA/PROVENTIL HFA/VENTOLIN HFA) 108 (90 Base) MCG/ACT inhaler Inhale 2 puffs into the lungs every 4 hours as needed for shortness of breath, wheezing or cough 18 g 3    buprenorphine (BUTRANS) 20 MCG/HR WK patch Place 1 patch onto the skin every 7 days 4 patch 2    exemestane (AROMASIN) 25 MG tablet Take 1 tablet (25 mg) by mouth daily 90 tablet 3    gabapentin (NEURONTIN) 300 MG capsule Take 2 capsules in the morning, 2 capsules, and 3 capsules at bedtime. 210 capsule 1    loperamide (IMODIUM) 2 MG capsule Start with 2 caps (4 mg), then take one cap (2 mg) after each diarrheal stool as needed. Do not use more than 8 caps (16 mg) per day. 30 capsule 0    methocarbamol (ROBAXIN) 500 MG tablet Take 2-3 tablets (1,000-1,500 mg) by mouth 3 times daily as needed for muscle spasms 210 tablet 2    methylPREDNISolone (MEDROL) 32 MG tablet Take 1 tablet (32 mg) by mouth daily 12 hours prior to the procedure with IV contrast 2 tablet 0    naloxone (NARCAN) 4 MG/0.1ML nasal spray Spray 1 spray (4 mg) into one nostril alternating nostrils once as needed for opioid reversal every 2-3 minutes until assistance arrives 0.2 mL 0    ondansetron (ZOFRAN) 8 MG tablet Take 1 tablet (8 mg) by mouth every 8 hours as needed for nausea (Patient not taking: Reported on 2/6/2024) 30 tablet 3    pantoprazole (PROTONIX) 40 MG EC tablet Take 1 tablet (40 mg) by mouth every morning (before breakfast) (Patient taking differently: Take 40 mg by mouth daily as needed) 30 tablet 1    polyethylene glycol (MIRALAX) 17 GM/Dose powder Take 17 g by mouth daily as  needed for constipation 578 g 3    prochlorperazine (COMPAZINE) 10 MG tablet Take 1 tablet (10 mg) by mouth every 6 hours as needed for nausea or vomiting 30 tablet 2    prochlorperazine (COMPAZINE) 10 MG tablet Take 1 tablet (10 mg) by mouth every 6 hours as needed for nausea or vomiting 30 tablet 3    QUEtiapine (SEROQUEL) 300 MG tablet Take 1 tablet (300 mg) by mouth at bedtime In addition to one (1) 50mg tablet for a total dose of 350mg 30 tablet 2    QUEtiapine (SEROQUEL) 50 MG tablet Take 1 tablet (50 mg) by mouth at bedtime In addition to one (1) 300mg tablet for a total dose of 350mg.  May also take an additional 1/2 of a tablet (25mg) during the night for sleep as-needed 45 tablet 2    rivaroxaban ANTICOAGULANT (XARELTO) 20 MG TABS tablet Take 1 tablet (20 mg) by mouth daily (with dinner) 90 tablet 3    SENNA-docusate sodium (SENNA S) 8.6-50 MG tablet Take 2 tablets by mouth 2 times daily as needed (Constipation) 100 tablet 3    sertraline (ZOLOFT) 100 MG tablet Take 1 tablet (100 mg) by mouth daily 90 tablet 0    Vitamin D3 (CHOLECALCIFEROL) 25 mcg (1000 units) tablet Take 1 tablet (25 mcg) by mouth daily 90 tablet 3        Labs and Data         7/6/2022    12:56 PM 10/17/2022    10:42 AM 12/8/2023     2:24 PM   PROMIS-10 Total Score w/o Sub Scores   PROMIS TOTAL - SUBSCORES 12 9 15          No data to display                  12/5/2022     8:47 AM 4/10/2023    10:10 AM 12/8/2023     2:21 PM   PHQ-9 SCORE   PHQ-9 Total Score MyChart 22 (Severe depression) 19 (Moderately severe depression) 11 (Moderate depression)   PHQ-9 Total Score 22 19 11         7/6/2022    12:54 PM 4/10/2023    10:07 AM 12/8/2023     2:26 PM   JENNIFFER-7 SCORE   Total Score 13 (moderate anxiety) 21 (severe anxiety) 20 (severe anxiety)   Total Score 13 21 20       Liver/Kidney Function, TSH Metabolic Blood counts   Recent Labs   Lab Test 02/01/24  0817 01/15/24  1413   AST 29 16   ALT 23 12   ALKPHOS 112 95   CR 1.05* 0.85     Recent Labs    Lab Test 03/16/23  1650   TSH 0.80    Recent Labs   Lab Test 01/05/23  1301   CHOL 150   TRIG 114   LDL 69   HDL 58     Recent Labs   Lab Test 01/05/23  1301   A1C 6.4*     Recent Labs   Lab Test 02/01/24  0817   *    Recent Labs   Lab Test 02/01/24  0817   WBC 4.1   HGB 11.0*   HCT 33.0*   *              ECG 10/19/23 QTc = 423ms    PROVIDER: Boris Taylor MD    Level of Medical Decision Making:   - At least 1 chronic problem that is not stable  - Engaged in prescription drug management during visit (discussed any medication benefits, side effects, alternatives, etc.)       Psychiatry Individual Psychotherapy Note   Psychotherapy start time - Not done at this appointment  Psychotherapy end time - Not done at this appointment  Date treatment plan last reviewed with patient - 12/04/23  Subjective: This supportive psychotherapy session addressed issues related to goals of therapy and current psychosocial stressors. Patient's reaction: Preparatory in the context of mental status appropriate for ambulatory setting.    Interactive complexity indicated? No  Plan: RTC in timeframe noted above  Psychotherapy services during this visit included myself and the patient.   Treatment Plan      SYMPTOMS; PROBLEMS   MEASURABLE GOALS;    FUNCTIONAL IMPROVEMENT / GAINS INTERVENTIONS DISCHARGE CRITERIA   Depression: depressed mood, low energy, feeling hopelesss, and excessive crying  Anxiety: excessive worry, feeling fearful, and nervous/overwhelmed  Psychosis: auditory hallucinations   reduce depressive symptoms, find enjoyment at least once a day, learn best practices for sleep, reduce panic attacks/ excessive worry, and identify coping strategies for AH Supportive / psychodynamic marked symptom improvement, reduced visit frequency, and can transfer back to primary care       Patient staffed in clinic with Dr. Woodruff who will sign the note.  Supervisor is Dr. Tyson.

## 2024-02-09 NOTE — PROGRESS NOTES
From: Boris Taylor MD   Sent: 2/9/2024   3:41 PM CST   To: Psychiatry Nurse-[facility]   Subject: FMLA Start Date Correction                       Hi team,     Talking with Teresa she mentioned that she needs her FMLA paperwork modified to have the start date changed to 12/14/23. She told me that we can just cross out the date and update it then resend it. Would that be possible?     Thank you!   Boris     *Start date on FMLA updated. New set of forms with the 12/14/23 start date sent to the patient via [employer]    Melany Ramirez RN on 2/12/2024 at 7:43 AM

## 2024-02-14 DIAGNOSIS — C79.51 CARCINOMA OF LEFT BREAST METASTATIC TO BONE (H): Primary | ICD-10-CM

## 2024-02-14 DIAGNOSIS — C50.912 CARCINOMA OF LEFT BREAST METASTATIC TO BONE (H): Primary | ICD-10-CM

## 2024-02-14 DIAGNOSIS — Z17.0 MALIGNANT NEOPLASM OF OVERLAPPING SITES OF LEFT BREAST IN FEMALE, ESTROGEN RECEPTOR POSITIVE (H): ICD-10-CM

## 2024-02-14 DIAGNOSIS — C50.812 MALIGNANT NEOPLASM OF OVERLAPPING SITES OF LEFT BREAST IN FEMALE, ESTROGEN RECEPTOR POSITIVE (H): ICD-10-CM

## 2024-02-15 ENCOUNTER — ONCOLOGY VISIT (OUTPATIENT)
Dept: ONCOLOGY | Facility: CLINIC | Age: 49
End: 2024-02-15
Attending: INTERNAL MEDICINE
Payer: MEDICARE

## 2024-02-15 ENCOUNTER — APPOINTMENT (OUTPATIENT)
Dept: LAB | Facility: CLINIC | Age: 49
End: 2024-02-15
Attending: INTERNAL MEDICINE
Payer: MEDICARE

## 2024-02-15 VITALS
HEART RATE: 78 BPM | SYSTOLIC BLOOD PRESSURE: 153 MMHG | HEIGHT: 72 IN | TEMPERATURE: 98 F | DIASTOLIC BLOOD PRESSURE: 95 MMHG | BODY MASS INDEX: 31.69 KG/M2 | RESPIRATION RATE: 20 BRPM | OXYGEN SATURATION: 99 % | WEIGHT: 234 LBS

## 2024-02-15 DIAGNOSIS — C79.51 CARCINOMA OF LEFT BREAST METASTATIC TO BONE (H): ICD-10-CM

## 2024-02-15 DIAGNOSIS — C50.812 MALIGNANT NEOPLASM OF OVERLAPPING SITES OF LEFT BREAST IN FEMALE, ESTROGEN RECEPTOR POSITIVE (H): Primary | ICD-10-CM

## 2024-02-15 DIAGNOSIS — Z17.0 MALIGNANT NEOPLASM OF OVERLAPPING SITES OF LEFT BREAST IN FEMALE, ESTROGEN RECEPTOR POSITIVE (H): Primary | ICD-10-CM

## 2024-02-15 DIAGNOSIS — C50.912 CARCINOMA OF LEFT BREAST METASTATIC TO BONE (H): ICD-10-CM

## 2024-02-15 LAB
ALBUMIN SERPL BCG-MCNC: 3.9 G/DL (ref 3.5–5.2)
ALP SERPL-CCNC: 101 U/L (ref 40–150)
ALT SERPL W P-5'-P-CCNC: <5 U/L (ref 0–50)
ANION GAP SERPL CALCULATED.3IONS-SCNC: 9 MMOL/L (ref 7–15)
AST SERPL W P-5'-P-CCNC: 12 U/L (ref 0–45)
BASOPHILS # BLD AUTO: 0.1 10E3/UL (ref 0–0.2)
BASOPHILS NFR BLD AUTO: 1 %
BILIRUB SERPL-MCNC: <0.2 MG/DL
BUN SERPL-MCNC: 14.4 MG/DL (ref 6–20)
CALCIUM SERPL-MCNC: 9.2 MG/DL (ref 8.6–10)
CANCER AG15-3 SERPL-ACNC: 62 U/ML
CEA SERPL-MCNC: 4.2 NG/ML
CHLORIDE SERPL-SCNC: 111 MMOL/L (ref 98–107)
CREAT SERPL-MCNC: 0.98 MG/DL (ref 0.51–0.95)
DEPRECATED HCO3 PLAS-SCNC: 23 MMOL/L (ref 22–29)
EGFRCR SERPLBLD CKD-EPI 2021: 71 ML/MIN/1.73M2
EOSINOPHIL # BLD AUTO: 0.1 10E3/UL (ref 0–0.7)
EOSINOPHIL NFR BLD AUTO: 2 %
ERYTHROCYTE [DISTWIDTH] IN BLOOD BY AUTOMATED COUNT: 15.5 % (ref 10–15)
GLUCOSE SERPL-MCNC: 124 MG/DL (ref 70–99)
HCT VFR BLD AUTO: 31.2 % (ref 35–47)
HGB BLD-MCNC: 10.7 G/DL (ref 11.7–15.7)
IMM GRANULOCYTES # BLD: 0 10E3/UL
IMM GRANULOCYTES NFR BLD: 0 %
LYMPHOCYTES # BLD AUTO: 1.5 10E3/UL (ref 0.8–5.3)
LYMPHOCYTES NFR BLD AUTO: 32 %
MCH RBC QN AUTO: 35.2 PG (ref 26.5–33)
MCHC RBC AUTO-ENTMCNC: 34.3 G/DL (ref 31.5–36.5)
MCV RBC AUTO: 103 FL (ref 78–100)
MONOCYTES # BLD AUTO: 0.2 10E3/UL (ref 0–1.3)
MONOCYTES NFR BLD AUTO: 5 %
NEUTROPHILS # BLD AUTO: 2.7 10E3/UL (ref 1.6–8.3)
NEUTROPHILS NFR BLD AUTO: 60 %
NRBC # BLD AUTO: 0 10E3/UL
NRBC BLD AUTO-RTO: 0 /100
PLATELET # BLD AUTO: 151 10E3/UL (ref 150–450)
POTASSIUM SERPL-SCNC: 3.6 MMOL/L (ref 3.4–5.3)
PROT SERPL-MCNC: 6.9 G/DL (ref 6.4–8.3)
RBC # BLD AUTO: 3.04 10E6/UL (ref 3.8–5.2)
SODIUM SERPL-SCNC: 143 MMOL/L (ref 135–145)
WBC # BLD AUTO: 4.5 10E3/UL (ref 4–11)

## 2024-02-15 PROCEDURE — G0463 HOSPITAL OUTPT CLINIC VISIT: HCPCS | Mod: 25 | Performed by: PHYSICIAN ASSISTANT

## 2024-02-15 PROCEDURE — 82378 CARCINOEMBRYONIC ANTIGEN: CPT | Performed by: PHYSICIAN ASSISTANT

## 2024-02-15 PROCEDURE — 86300 IMMUNOASSAY TUMOR CA 15-3: CPT | Performed by: PHYSICIAN ASSISTANT

## 2024-02-15 PROCEDURE — 99214 OFFICE O/P EST MOD 30 MIN: CPT | Performed by: PHYSICIAN ASSISTANT

## 2024-02-15 PROCEDURE — 85048 AUTOMATED LEUKOCYTE COUNT: CPT | Performed by: PHYSICIAN ASSISTANT

## 2024-02-15 PROCEDURE — 82247 BILIRUBIN TOTAL: CPT | Performed by: PHYSICIAN ASSISTANT

## 2024-02-15 PROCEDURE — 96402 CHEMO HORMON ANTINEOPL SQ/IM: CPT | Performed by: PHYSICIAN ASSISTANT

## 2024-02-15 PROCEDURE — 250N000011 HC RX IP 250 OP 636: Mod: JZ | Performed by: INTERNAL MEDICINE

## 2024-02-15 PROCEDURE — 36415 COLL VENOUS BLD VENIPUNCTURE: CPT | Performed by: PHYSICIAN ASSISTANT

## 2024-02-15 RX ORDER — LAMOTRIGINE 25 MG/1
500 TABLET ORAL ONCE
Status: COMPLETED | OUTPATIENT
Start: 2024-02-15 | End: 2024-02-15

## 2024-02-15 RX ADMIN — FULVESTRANT 500 MG: 50 INJECTION, SOLUTION INTRAMUSCULAR at 16:12

## 2024-02-15 ASSESSMENT — PAIN SCALES - GENERAL: PAINLEVEL: SEVERE PAIN (6)

## 2024-02-15 NOTE — LETTER
2/15/2024         RE: Teresa Sanderson  [address] [facility] [id number]        Dear Colleague,    Thank you for referring your patient, Teresa Sanderson, to the [facility]. Please see a copy of my visit note below.    Oncology Visit:   Date on this visit: Feb 15, 2024    Diagnosis:  ER positive left breast cancer metastasized to bones.     Primary Physician: No Ref-Primary, Physician     History Of Present Illness:    Ms. Sanderson is a 48 year old female with a h/o tobacco abuse and DVTs with left breast cancer metastasized to bone. She presented to [facility] ED with back pain on 12/5/2020. MRI of the L-spine showed an abnormal L4 lesion with associated right paraspinal mass, abnormalities in L5 and the left iliac bone were also seen. CT C/A/P showed a left breast mass, lytic lesions of T7, L4, and the pelvis, and a 3 cm lesion in the kidney (thought to be a cyst). Ultrasound of the bilateral lower extremities showed a non-occlusive thrombus in the left popliteal vein. Mammogram and ultrasound of the bilateral breasts on 12/17/2020 showed a spiculated mass measuring at least 7.8 cm at 12-1:00 left breast extending from the nipple to 9 cm from the nipple with associated nipple retraction. This mass was biopsied, and showed IDC with surrounding DCIS, grade 3, ER+ 90%, and MO+ 75%.  HER2 was equivocal in approximately 35% of tumor cells by FISH and was negative by IHC.    Metastatic Breast Cancer Treatment:  12/23/2021 - 1/7/2021  Radiation (3000 cGy) to the lumbar spine.  1/29/2021 - present  Ibrance, zoladex, and anastrozole.  5/17/2021 radiofrequency ablation, kyphoplasty to L4  8/20/2021  Bilateral salpingo-oophorectomies, Ibrance and anastrozole.  She was off anastrozole 12/2021 - 2/2022 and off Ibrance 01/2022 - 02/2022 due to stress and impending homelessness. Off both again 03/2022-04/2022 due to death of her son but restarted in 05/2022.  04/2023  PET/CT with  "progression in 2 small metastases in the left pelvis.  Palbociclib continued.  Anastrozole changed to exemestane  5/5/2023  Completed radiation, 2000 cGy in 5 fractions, to the left ilium.  12/19/2023 Left breast biopsy  [facility] NGS:   ER positive, 3+  100%   OH positive, 1+, 5%   HER2 negative.   AR positive, 1+, 35%   MMR proficient   PD-L1 negative, CPS 0   PTEN positive, 1+ 100%  *Of note, all of the above was IHC, DNA quantity not sufficient for NGS  1/18/24 Fulvestrant + abemaciclib     Interval History: Teresa is seen in routine follow-up. She has been on treatment with fulvestrant and abemaciclib for about a month. She has noticed intermittent diarrhea, 1-3 stools a few days per week. She has not needed Imodium. She has not had any nausea or vomiting. She has also noticed some hot flashes. No other notable side effects. Back pain is the same. No L hip pain though sometimes she still has \"spasms' down that leg. Breast mass feels about the same and is sore to touch. She has been a little more active and is getting out of the house more.     She notes she had a recent manic episode with staying awake for >24 hours, pressured speech, grandiose ideas so psychiatry increased her olanzapine and decreased sertraline dose. She has follow-up with them next week.      Past Medical/Surgical History:   Past Medical History:   Diagnosis Date    Anxiety     Breast CA (H) 12/2020    Depression     DVT (deep venous thrombosis) (H) 2014    Left breast mass     x approximately 4-5 months    Pulmonary emboli (H)     Pyelonephritis     Schizoaffective disorder (H)     Tobacco use      Past Surgical History:   Procedure Laterality Date    COLONOSCOPY N/A 7/8/2022    Procedure: COLONOSCOPY, WITH POLYPECTOMY;  Surgeon: Ham Cano MD;  Location: [facility] OR    IR LUMBAR KYPHOPLASTY VERTEBRAE  5/17/2021    LAPAROSCOPIC SALPINGO-OOPHORECTOMY Bilateral 8/20/2021    Procedure: BILATERAL SALPINGO-OOPHORECTOMY, LAPAROSCOPIC;  Surgeon: " Jessica, Rory Adhikari MD;  [place] [facility] [place]    TUBAL LIGATION  1998        Allergies   Allergen Reactions    Contrast Dye      Pt developed nausea after isovue 370 injection on 6/9/21        Current Outpatient Medications   Medication    abemaciclib (VERZENIO) 150 MG tablet    abemaciclib (VERZENIO) 150 MG tablet    acetaminophen (TYLENOL) 500 MG tablet    albuterol (PROAIR HFA/PROVENTIL HFA/VENTOLIN HFA) 108 (90 Base) MCG/ACT inhaler    buprenorphine (BUTRANS) 20 MCG/HR WK patch    exemestane (AROMASIN) 25 MG tablet    gabapentin (NEURONTIN) 300 MG capsule    loperamide (IMODIUM) 2 MG capsule    methocarbamol (ROBAXIN) 500 MG tablet    methylPREDNISolone (MEDROL) 32 MG tablet    naloxone (NARCAN) 4 MG/0.1ML nasal spray    pantoprazole (PROTONIX) 40 MG EC tablet    polyethylene glycol (MIRALAX) 17 GM/Dose powder    prochlorperazine (COMPAZINE) 10 MG tablet    prochlorperazine (COMPAZINE) 10 MG tablet    QUEtiapine (SEROQUEL) 400 MG tablet    QUEtiapine (SEROQUEL) 50 MG tablet    rivaroxaban ANTICOAGULANT (XARELTO) 20 MG TABS tablet    SENNA-docusate sodium (SENNA S) 8.6-50 MG tablet    sertraline (ZOLOFT) 50 MG tablet    Vitamin D3 (CHOLECALCIFEROL) 25 mcg (1000 units) tablet    ondansetron (ZOFRAN) 8 MG tablet     Current Facility-Administered Medications   Medication    fulvestrant (FASLODEX) injection 500 mg     Facility-Administered Medications Ordered in Other Visits   Medication    lidocaine 1% with EPINEPHrine 1:100,000 injection 10 mL   '  Physical Exam:   BP (!) 153/95   Pulse 78   Temp 98  F (36.7  C)   Resp 20   Wt 106.1 kg (234 lb)   SpO2 99%   BMI 31.74 kg/m    General:  Well appearing adult female in NAD.  Alert and oriented x 3.  HEENT:  Normocephalic.  Sclera anicteric. Palpable enlarged thyroid on exam.  MMM.  No lesions of the oropharynx.  Lymph:  No palpable cervical, supraclavicular, or axillary LAD.   Chest:  CTA bilaterally.  No wheezes or crackles.  CV:  RRR. No audible  m/r/g.  Breast:  At 12:00 left breast is a kristina sized palpable mass. The mass is obvious on inspection. No notable skin involvement or ulceration.    Abd:  Soft/ND/NT   Ext:  No edema of the bilateral lower extremities.    Neuro:  Cranial nerves grossly intact.  Alert and oriented x 3.  Psych:  Mood and affect appear normal.    Skin:  No visible concerning skin rashes or lesions.    Laboratory/Imaging Studies:   I personally reviewed the below laboratories and images:     02/15/24 15:18   Sodium 143   Potassium 3.6   Chloride 111 (H)   Carbon Dioxide (CO2) 23   Urea Nitrogen 14.4   Creatinine 0.98 (H)   GFR Estimate 71   Calcium 9.2   Anion Gap 9   Albumin 3.9   Protein Total 6.9   Alkaline Phosphatase 101   ALT <5   AST 12   Bilirubin Total <0.2   Glucose 124 (H)   WBC 4.5   Hemoglobin 10.7 (L)   Hematocrit 31.2 (L)   Platelet Count 151   RBC Count 3.04 (L)    (H)   MCH 35.2 (H)   MCHC 34.3   RDW 15.5 (H)   % Neutrophils 60   % Lymphocytes 32   % Monocytes 5   % Eosinophils 2   % Basophils 1   Absolute Basophils 0.1   Absolute Eosinophils 0.1   Absolute Immature Granulocytes 0.0   Absolute Lymphocytes 1.5   Absolute Monocytes 0.2   % Immature Granulocytes 0   Absolute Neutrophils 2.7   Absolute NRBCs 0.0   NRBCs per 100 WBC 0         ASSESSMENT/PLAN:   48 year old female with history of DVT and ER positive left breast cancer metastasized to bones.    1.  Metastatic breast cancer: Most recently on treatment with palbociclib and exemestane.  PET/CT in 12/2023 c/w disease progression in the left breast and bones including T7, left ilium, and left posterior acetabulum.  - Biopsy of left breast mass performed on 12/19/2023 shows the tumor remains ER positive and HER2 negative.  Unfortunately, [facility] NGS for ESR1 and PIK3CA mutations was unable to be performed due to insufficient quantity of DNA.   -Recently started abemaciclib and fulvestrant. Due for day 28 of fulvestrant today and then this will be  monthly. Tolerating well with mild symptoms.   - Plan to monitor blood counts every 2 weeks x 2 months.  - Will monitor monthly tumor markers and repeat a PET/CT 2-3 months after starting.    2.  Bone metastases/low back pain with LLE sciatica:  She is s/p XRT to the left ilium as well as the lumbar spine and kyphoplasty of L4--there was some extravasation of cement which procedularist is aware of and recommended continued AC indefinitely.   - buprenorphine 5 mcg/hr patch, Robaxin, and gabapentin.  Ongoing follow up with palliative medicine.  - Pain mostly continues in low back. L groin pain is gone.   - Receiving Zometa once every 3 months.  Next due around 5/1/24.     3.  Schizoaffective disorder, bipolar type: Recent medication adjustment after having a manic episode with increase in seroquel and decrease in zoloft. Ongoing follow up with psychiatry.     4.  DVT:  No change since last visit.  She continues on Xarelto.    5. Diarrhea: G1. Imodium for 3 loose stools in over 24 hours.   Alee De Los Santos PA-C

## 2024-02-15 NOTE — NURSING NOTE
"Oncology Rooming Note    February 15, 2024 4:39 PM   Teresa Sanderson is a 48 year old female who presents for:    Chief Complaint   Patient presents with    Blood Draw     Labs collected from venipuncture by RN. Vitals taken. Checked in for appointment(s).      Oncology Clinic Visit     [facility] RETURN - BREAST CANCER     Initial Vitals: BP (!) 153/95   Pulse 78   Temp 98  F (36.7  C)   Resp 20   Ht 1.829 m (6' 0.01\")   Wt 106.1 kg (234 lb)   SpO2 99%   BMI 31.73 kg/m   Estimated body mass index is 31.73 kg/m  as calculated from the following:    Height as of this encounter: 1.829 m (6' 0.01\").    Weight as of this encounter: 106.1 kg (234 lb). Body surface area is 2.32 meters squared.  Severe Pain (6) Comment: Data Unavailable   No LMP recorded. (Menstrual status: Tubal ).  Allergies reviewed: Yes  Medications reviewed: Yes    Medications: Medication refills not needed today.  Pharmacy name entered into [facility]:    [id number] - [place], [place] - [address] AT [facility] [address]  [facility] - [place], [place] - [address]  [id number] - [place], [place] - [address] [facility]  [id number] - [place], [place] - [address] AT [address] - [place], [place] - [address]     Frailty Screening:   Is the patient here for a new oncology consult visit in cancer care? 2. No    Joao Moseley LPN              "

## 2024-02-15 NOTE — NURSING NOTE
Chief Complaint   Patient presents with    Blood Draw     Labs collected from venipuncture by RN. Vitals taken. Checked in for appointment(s).        Labs collected from venipuncture by RN. Vitals taken. Checked in for appointment(s).     Sara Steinberg RN

## 2024-02-15 NOTE — PROGRESS NOTES
Oncology Visit:   Date on this visit: Feb 15, 2024    Diagnosis:  ER positive left breast cancer metastasized to bones.     Primary Physician: No Ref-Primary, Physician     History Of Present Illness:    Ms. Sanderson is a 48 year old female with a h/o tobacco abuse and DVTs with left breast cancer metastasized to bone. She presented to [place] ED with back pain on 12/5/2020. MRI of the L-spine showed an abnormal L4 lesion with associated right paraspinal mass, abnormalities in L5 and the left iliac bone were also seen. CT C/A/P showed a left breast mass, lytic lesions of T7, L4, and the pelvis, and a 3 cm lesion in the kidney (thought to be a cyst). Ultrasound of the bilateral lower extremities showed a non-occlusive thrombus in the left popliteal vein. Mammogram and ultrasound of the bilateral breasts on 12/17/2020 showed a spiculated mass measuring at least 7.8 cm at 12-1:00 left breast extending from the nipple to 9 cm from the nipple with associated nipple retraction. This mass was biopsied, and showed IDC with surrounding DCIS, grade 3, ER+ 90%, and MI+ 75%.  HER2 was equivocal in approximately 35% of tumor cells by FISH and was negative by IHC.    Metastatic Breast Cancer Treatment:  12/23/2021 - 1/7/2021  Radiation (3000 cGy) to the lumbar spine.  1/29/2021 - present  Ibrance, zoladex, and anastrozole.  5/17/2021 radiofrequency ablation, kyphoplasty to L4  8/20/2021  Bilateral salpingo-oophorectomies, Ibrance and anastrozole.  She was off anastrozole 12/2021 - 2/2022 and off Ibrance 01/2022 - 02/2022 due to stress and impending homelessness. Off both again 03/2022-04/2022 due to death of her son but restarted in 05/2022.  04/2023  PET/CT with progression in 2 small metastases in the left pelvis.  Palbociclib continued.  Anastrozole changed to exemestane  5/5/2023  Completed radiation, 2000 cGy in 5 fractions, to the left ilium.  12/19/2023 Left breast biopsy  [facility] NGS:   ER positive, 3+  100%   MI  "positive, 1+, 5%   HER2 negative.   AR positive, 1+, 35%   MMR proficient   PD-L1 negative, CPS 0   PTEN positive, 1+ 100%  *Of note, all of the above was IHC, DNA quantity not sufficient for NGS  1/18/24 Fulvestrant + abemaciclib     Interval History: Teresa is seen in routine follow-up. She has been on treatment with fulvestrant and abemaciclib for about a month. She has noticed intermittent diarrhea, 1-3 stools a few days per week. She has not needed Imodium. She has not had any nausea or vomiting. She has also noticed some hot flashes. No other notable side effects. Back pain is the same. No L hip pain though sometimes she still has \"spasms' down that leg. Breast mass feels about the same and is sore to touch. She has been a little more active and is getting out of the house more.     She notes she had a recent manic episode with staying awake for >24 hours, pressured speech, grandiose ideas so psychiatry increased her olanzapine and decreased sertraline dose. She has follow-up with them next week.      Past Medical/Surgical History:   Past Medical History:   Diagnosis Date    Anxiety     Breast CA (H) 12/2020    Depression     DVT (deep venous thrombosis) (H) 2014    Left breast mass     x approximately 4-5 months    Pulmonary emboli (H)     Pyelonephritis     Schizoaffective disorder (H)     Tobacco use      Past Surgical History:   Procedure Laterality Date    COLONOSCOPY N/A 7/8/2022    Procedure: COLONOSCOPY, WITH POLYPECTOMY;  Surgeon: Ham Cano MD;  [place] [facility] [place]    IR LUMBAR KYPHOPLASTY VERTEBRAE  5/17/2021    LAPAROSCOPIC SALPINGO-OOPHORECTOMY Bilateral 8/20/2021    Procedure: BILATERAL SALPINGO-OOPHORECTOMY, LAPAROSCOPIC;  Surgeon: Rory Lopez MD;  Location: [facility] [place]    TUBAL LIGATION  1998        Allergies   Allergen Reactions    Contrast Dye      Pt developed nausea after isovue 370 injection on 6/9/21        Current Outpatient Medications   Medication    abemaciclib " (VERZENIO) 150 MG tablet    abemaciclib (VERZENIO) 150 MG tablet    acetaminophen (TYLENOL) 500 MG tablet    albuterol (PROAIR HFA/PROVENTIL HFA/VENTOLIN HFA) 108 (90 Base) MCG/ACT inhaler    buprenorphine (BUTRANS) 20 MCG/HR WK patch    exemestane (AROMASIN) 25 MG tablet    gabapentin (NEURONTIN) 300 MG capsule    loperamide (IMODIUM) 2 MG capsule    methocarbamol (ROBAXIN) 500 MG tablet    methylPREDNISolone (MEDROL) 32 MG tablet    naloxone (NARCAN) 4 MG/0.1ML nasal spray    pantoprazole (PROTONIX) 40 MG EC tablet    polyethylene glycol (MIRALAX) 17 GM/Dose powder    prochlorperazine (COMPAZINE) 10 MG tablet    prochlorperazine (COMPAZINE) 10 MG tablet    QUEtiapine (SEROQUEL) 400 MG tablet    QUEtiapine (SEROQUEL) 50 MG tablet    rivaroxaban ANTICOAGULANT (XARELTO) 20 MG TABS tablet    SENNA-docusate sodium (SENNA S) 8.6-50 MG tablet    sertraline (ZOLOFT) 50 MG tablet    Vitamin D3 (CHOLECALCIFEROL) 25 mcg (1000 units) tablet    ondansetron (ZOFRAN) 8 MG tablet     Current Facility-Administered Medications   Medication    fulvestrant (FASLODEX) injection 500 mg     Facility-Administered Medications Ordered in Other Visits   Medication    lidocaine 1% with EPINEPHrine 1:100,000 injection 10 mL   '  Physical Exam:   BP (!) 153/95   Pulse 78   Temp 98  F (36.7  C)   Resp 20   Wt 106.1 kg (234 lb)   SpO2 99%   BMI 31.74 kg/m    General:  Well appearing adult female in NAD.  Alert and oriented x 3.  HEENT:  Normocephalic.  Sclera anicteric. Palpable enlarged thyroid on exam.  MMM.  No lesions of the oropharynx.  Lymph:  No palpable cervical, supraclavicular, or axillary LAD.   Chest:  CTA bilaterally.  No wheezes or crackles.  CV:  RRR. No audible m/r/g.  Breast:  At 12:00 left breast is a kristina sized palpable mass. The mass is obvious on inspection. No notable skin involvement or ulceration.    Abd:  Soft/ND/NT   Ext:  No edema of the bilateral lower extremities.    Neuro:  Cranial nerves grossly intact.   Alert and oriented x 3.  Psych:  Mood and affect appear normal.    Skin:  No visible concerning skin rashes or lesions.    Laboratory/Imaging Studies:   I personally reviewed the below laboratories and images:     02/15/24 15:18   Sodium 143   Potassium 3.6   Chloride 111 (H)   Carbon Dioxide (CO2) 23   Urea Nitrogen 14.4   Creatinine 0.98 (H)   GFR Estimate 71   Calcium 9.2   Anion Gap 9   Albumin 3.9   Protein Total 6.9   Alkaline Phosphatase 101   ALT <5   AST 12   Bilirubin Total <0.2   Glucose 124 (H)   WBC 4.5   Hemoglobin 10.7 (L)   Hematocrit 31.2 (L)   Platelet Count 151   RBC Count 3.04 (L)    (H)   MCH 35.2 (H)   MCHC 34.3   RDW 15.5 (H)   % Neutrophils 60   % Lymphocytes 32   % Monocytes 5   % Eosinophils 2   % Basophils 1   Absolute Basophils 0.1   Absolute Eosinophils 0.1   Absolute Immature Granulocytes 0.0   Absolute Lymphocytes 1.5   Absolute Monocytes 0.2   % Immature Granulocytes 0   Absolute Neutrophils 2.7   Absolute NRBCs 0.0   NRBCs per 100 WBC 0         ASSESSMENT/PLAN:   48 year old female with history of DVT and ER positive left breast cancer metastasized to bones.    1.  Metastatic breast cancer: Most recently on treatment with palbociclib and exemestane.  PET/CT in 12/2023 c/w disease progression in the left breast and bones including T7, left ilium, and left posterior acetabulum.  - Biopsy of left breast mass performed on 12/19/2023 shows the tumor remains ER positive and HER2 negative.  Unfortunately, [facility] NGS for ESR1 and PIK3CA mutations was unable to be performed due to insufficient quantity of DNA.   -Recently started abemaciclib and fulvestrant. Due for day 28 of fulvestrant today and then this will be monthly. Tolerating well with mild symptoms.   - Plan to monitor blood counts every 2 weeks x 2 months.  - Will monitor monthly tumor markers and repeat a PET/CT 2-3 months after starting.    2.  Bone metastases/low back pain with LLE sciatica:  She is s/p XRT to the left  ilium as well as the lumbar spine and kyphoplasty of L4--there was some extravasation of cement which procedularist is aware of and recommended continued AC indefinitely.   - buprenorphine 5 mcg/hr patch, Robaxin, and gabapentin.  Ongoing follow up with palliative medicine.  - Pain mostly continues in low back. L groin pain is gone.   - Receiving Zometa once every 3 months.  Next due around 5/1/24.     3.  Schizoaffective disorder, bipolar type: Recent medication adjustment after having a manic episode with increase in seroquel and decrease in zoloft. Ongoing follow up with psychiatry.     4.  DVT:  No change since last visit.  She continues on Xarelto.    5. Diarrhea: G1. Imodium for 3 loose stools in over 24 hours.     Alee De Los Santos PA-C

## 2024-02-15 NOTE — NURSING NOTE
FASLODEX administered in left ventrogluteal by WSORAYA and right ventrogluteal by JQ.    Patient tolerated well and had no issues.    Joao Moseley LPN

## 2024-02-20 ENCOUNTER — PATIENT OUTREACH (OUTPATIENT)
Dept: CARE COORDINATION | Facility: CLINIC | Age: 49
End: 2024-02-20
Payer: MEDICARE

## 2024-02-20 NOTE — PROGRESS NOTES
Social Work - Follow-Up  [facility]    Data/Intervention:    Patient Name: Teresa Sanderson Goes By: Teresa    /Age: 1975 (48 year old)    Reason for Follow-Up:  [facility] forms and financial assistance    Collaborated With:    -patient  -    Intervention/Education/Resources Provided:  Followed up with patient regarding [facility] forms she provided for provider to complete.   Medical opinion form was a duplicate as it was previously completed in January.     Form for special dietary needs to assist with increase in food stamps, not applicable to patient as they are not have any special dietary needs at this time.     SW will follow up with patient's provided Dr. Plunkett to complete personal statement of need form for housing stabilization program.     Patient expressed concerns with obtaining food. Patient states they have tried accessing food at food shelves and have not had a good experience with finding nutritional options.     SW enrolled patient into [employer]    Patient discussed various ways in which they are seeking assistance and expressed they are having difficulty managing their cost of living expenses. Patient reports they are not currently on energy assistance. Patient states their lease is in both their son's and patient's name as well as some of their utility bills are in their son's name.     SVETA provided Energy assistance phone number for [facility] for patient to check if son may be eligible to apply for assistance.     Informed patein of approval of [employer] and Pay it forward grants. Patient states they received pay it forward gera and applied it toward their rent. Patient states they have not yet received [employer] gera.     SVETA provided patient with phone to Cancer care gera organization to determine gera eligibility.      Assessment/Plan:  [employer] will follow up with Dr. Plunkett regarding housing stabilization form    [employer] will follow up with  patient directly regarding next steps.     SW emailed [facility] welcome information and will print out info to provide to patient as well.       Previously provided patient/family with writer's contact information and availability.      Jo CORTEZ, [facility]  - Oncology  Phone : [phone]  Pager: [phone]

## 2024-02-21 ENCOUNTER — PATIENT OUTREACH (OUTPATIENT)
Dept: CARE COORDINATION | Facility: CLINIC | Age: 49
End: 2024-02-21
Payer: MEDICARE

## 2024-02-21 NOTE — PROGRESS NOTES
Social Work - Telephone/[employer] message  [facility]  Data:   Patient Name: Teresa Sanderson  Goes By: Teresa    /Age: 1975 (48 year old)      Referral Source: patient    Reason for Referral: financial assistance    Intervention: Unable to leave message due to patient is unable to check their messages. SW called patient's daughter and son as patient has given verbal permission to leave messages with daughter and son if unable to reach patient by phone. SW called patient's daughter and son  and left messages with SW's contact information and asked that patient give SW a call back when possible .     Patient returned SW's call, SW was unavailable to answer at that time. SW made two more attempts to reach patient by calling patient at their number. No voicemail was left as patient has stated they are not able to check their voicemail. SW made another attempt to reach patient by calling patient's son's number and left a message asking patient call SW back to coordinate patient coming into the clinic to  paperwork and resources. SW communicated office hours in message.       Plan:  will await patient's return phone call/message and provide assistance at that time.      Jo CORTEZ, [facility]  - Oncology  Phone : [phone]  Pager: [phone]

## 2024-02-22 NOTE — PROGRESS NOTES
"Virtual Visit Details    Type of service:  Video Visit   Video Start Time: 3:08 PM  Video End Time: 3:33 PM    Originating Location (pt. Location): Home    Distant Location (provider location):  On-site  Platform used for Video Visit: [facility]  MEDICAL PROGRESS NOTE     CARE TEAM:    PCP- Physician No Ref-Primary  Therapist- None    Teresa is a 48 year old who uses the pronouns she, her, hers.      Diagnoses     Schizoaffective disorder, bipolar type (previously diagnosed with bipolar disorder), MRE depressed,     Rule out hypomania    Bereavement  Metastatic breast cancer     Assessment     Teresa was seen today for follow-up and ongoing medication management. Issues discussed are included below:    - Depression/Anxiety: Depression and anxiety are relatively stable, but have been less of a focus since the acute onset of sleep dysregulation and energy change recently. Some recurrence of symptoms in context of increased thoughts about her son with the anniversary of his death coming up, but has been able to cope with this reasonably well to now, but would appreciate additional support going forward. Discussed utility of working with a therapist, and Teresa was amenable to an individual psychotherapy referral today. No changes to management of depressive/anxious symptoms at this time.    - Schizoaffective Disorder: Some improvement in the acute symptoms that had been concerning for tom (reduced need for sleep, feeling of energy/drive without direction, more goal-directed behavior), but still experiencing some periods of elevated energy, sleep dysregulation (but improved overall quantity), and feeling of being \"out of control\" at times. Affirms that she has been utilizing the 50-100mg of Seroquel at bedtime. Discussed different approaches, and Teresa was open to trying a daytime 25-50mg PRN dose of Seroquel to help " with symptoms occurring outside of nights. Discussed use the PRN and to avoid use if she notices her sleep schedule shifting significantly. Overall symptoms concerning for hypomania do appear to be showing some improvements with previous increase in Seroquel, but will continue to monitor closely. Possible that some degree of current symptoms is related to grief for her son as well as ongoing anxiety, thus will continue to observe symptom course closely for any recurring/worsening indicators of hypomania. Instructed Teresa to contact the clinic directly or to go to the emergency department should symptoms worsen prior to her follow-up appointment.     Future Considerations:  - Consider switching Seroquel to alternative SGA if no improvement with dose optimization  - Increase in sertraline    Psychotropic Drug Interactions:  [PSYCHCLINICDDI]  ADDITIVE SEDATION: Butrans, Robaxin, Seroquel  Management: Routine Monitoring, refer for MTM  for evaluation for DDI with full array of medications    [place] was not [place] today:  writer not prescribing controlled substances at this time    Risk Statements:   Treatment Risk- Risks, benefits, alternatives and potential adverse effects have been discussed and are understood.   Safety Risk-Teresa did not appear to be an imminent safety risk to self or others.     Plan     1) Medications:   - continue scheduled Seroquel 400mg at bedtime   - ADD daytime PRN Seroquel 25-50mg Seroquel to existing 1-2 tablets ( mg) at bedtime PRN  - continue sertraline 50mg daily     Prescribed by Heme Onc/Other Providers:  - Butrans 5mcg/hr wk patch  - Gabapentin 600 mg in the morning, 600 mg at 2 pm and 600 mg at bedtime  - Robaxin 500 mg TID PRN for muscle spasms  - Rivaroxaban 20mg tablet daily    2) Psychotherapy: Writer to contact prior therapist about re-establishing vs referral to new provider for individual therapy    3) Next due:  Labs- SGA monitoring labs due as of 01/2024  EKG-  "Routine monitoring is not indicated for current psychotropic medication regimen   Rating scales- AIMS: due at next in-person appointment    4) Referrals: Therapy- for individual psychotherapy    5) Other: none    6) Follow-up: Return to clinic in 2 weeks     Pertinent Background                                                   [most recent eval 23]   Medical: Diagnosed with L breast cancer in Dec 2020 after presenting with a few mos of back pain. Mets to L4, L5, left iliac bone as well as paraspinal mass. 2021 started Ibrance, zoladex, and anastrozole; 21- s/p radiofrequency ablation, keloplasty/ segmental plasty of L4; 2021- showing complete response to treatment.  Psych: Lifetime history of \"rapid mood shifts\". S/P 7-8 hospitalizations, all in [place] except the 1st at [facility] in . Seroquel started at that time, took 100mg TID x yrs. Details in eval from said period. Hospitalization [id number] is discussed in eval and documents what sounds like a full manic episode. CA dx'd 2020. Zoloft started after the cancer dx. Seroquel 100mg was stopped 2021 (not helping), doxepin and Ambien tried for sleep-neither helped. Son  2022 by accidental overdose.   Meds: Seroquel x years 100mg-300mg was helpful but 300mg too sedating & became  less effective over time; Invega for a short period; Zyprexa was helpful; no Haldol or Risperdal; Lithium did not help (? 2 yrs ago) same for Depakote; trazodone; no HOWELL     Pertinent Items Include: suicidal ideation, psychosis, tom, mutiple   psychotropic trials, trauma hx, violent behavior, psych hosps, cocaine . Last hosp  2021.     Subjective     \"Still kind of is [rough], but a bit better\". Has been thinking about her son and his death more recently because the anniversary of his death is coming up. Identifies that this has been an additional factor making it difficult to get her symptoms under better control at present.    Sleep has been " "\"on and off\", some nights she gets to sleep OK, others her sleep is somewhat delayed, but then will sleep in later to make up for it. Feeling of directionless energy has been \"on and off\", going between lots of energy and little energy during the day. Feels like her mood \"flips\" between days and has been \"exhausting\" for her. Associated with a sensation of being \"emotionally tired\" with trying to handle those oscillations in symptoms.    Has been using between 50mg and 100mg at either bedtime or somewhat delayed if she is unsure if she is going to have trouble sleeping. Does feel that overall the PRN addition has improved her quality of sleep.    Daughter and grandkids are still staying with her, which has been very helpful.    No reported SI/HI or other safety concerns at this time.    Recent Psych Symptoms:   Depression:  depressed mood, low energy, insomnia, feeling hopeless, excessive crying, overwhelmed, and mood dysregulation  Elevated:  increased energy, decreased sleep need, increased activity, distractibility , and racing thoughts  Psychosis:  auditory hallucinations - not since last appointment  Anxiety:  feeling fearful and nervous/overwhelmed  Trauma Related:   Medical trauma/stressors, anxiety increased when thinking about medical procedures and results  Insomnia:  Yes: intermittent difficulty with both initiation and maintenance due to worries at night - somewhat improve with increased Seroquel  Other:  No    Current Social History:  Financial/occupational: on disability for finances currently  Living situation (partner, children, pets, etc): Rents an apartment, son has a room with her but doesn't stay there often, 1 cat (\"Toejoe\")  Social/spiritual support: Prayer is important, best friend in [place], sister in [place], son  Feels safe at home: Yes    Pertinent Substance Use:   Alcohol: Rarely, maybe once every other month   Cannabis: Infrequently - 1-2x per month for refractory pain  Tobacco: Yes: " "about 6-7 (often doesn't finish them)  Caffeine:  Yes: about 4-5 cans of coke per day, 1 cup of coffee in morning - recently reducing this intake  Opioids: Yes: prescribed   Narcan Kit current: Yes  Other substances: none    Medical Review of Systems:   Lightheadedness/orthostasis: None  Headaches: Very rare tension headaches  GI: none  Sexual health concerns: None    A comprehensive review of systems was performed and is negative other than noted above.    Contraception: \"tubes are tied\"     Mental Status Exam     Alertness: alert  and oriented  Appearance: adequately groomed  Behavior/Demeanor: cooperative, pleasant, and calm, with good  eye contact   Speech: normal and regular rate and rhythm  Language: intact and no problems  Psychomotor:  Persistent rocking while seated  Mood: depressed and anxious  Affect: restricted and anxious ; congruent to: mood- yes, content- yes  Thought Process/Associations: unremarkable  Thought Content:  Reports none;  Denies suicidal & violent ideation and delusions and paranoid ideation  Perception:  Reports auditory hallucinations without commands [details in history];  Denies visual hallucinations  Insight: fair - open to review and discussion of symptoms across diagnosis, but some difficulty with specific identification of symptoms potentially related to schizoaffective disorder  Judgment: fair  Cognition: does  appear grossly intact; formal cognitive testing was not done  Gait and Station: N/A (telehealth)     Past Psych Med Trials      Medication Max Dose (mg) Dates / Duration Helpful? DC Reason / Adverse Effects?   Seroquel 300mg  yes    Zoloft 450mg  yes    Gabapentin 600mg TID  ?    Olanzapine 5mg BID      Depakote   no    lithium   no    Invega    Only brief trial   Ambien          Vitals   Ht 1.829 m (6')   Wt 105.7 kg (233 lb)   BMI 31.60 kg/m    Pulse Readings from Last 3 Encounters:   02/15/24 78   02/01/24 87   01/18/24 62     Wt Readings from Last 3 Encounters: "   02/23/24 105.7 kg (233 lb)   02/15/24 106.1 kg (234 lb)   02/09/24 104.3 kg (230 lb)     BP Readings from Last 3 Encounters:   02/15/24 (!) 153/95   02/01/24 (!) 138/92   01/18/24 (!) 153/101        Medical History     ALLERGIES: Contrast dye    Patient Active Problem List   Diagnosis    Breast mass    Spine metastasis    Urinary tract infection with hematuria    Malignant neoplasm of overlapping sites of left breast in female, estrogen receptor positive (H)    Carcinoma of left breast metastatic to bone (H)    Metastasis to bone (H)    Pain of right lower leg    Malignant neoplasm of female breast, unspecified estrogen receptor status, unspecified laterality, unspecified site of breast (H)    Schizoaffective disorder, bipolar type (H)    Insomnia, unspecified type        Medications     Current Outpatient Medications   Medication Sig Dispense Refill    abemaciclib (VERZENIO) 150 MG tablet Take 1 tablet (150 mg) by mouth 2 times daily for 28 days 56 tablet 0    acetaminophen (TYLENOL) 500 MG tablet Take 500-1,000 mg by mouth every 6 hours as needed for mild pain      albuterol (PROAIR HFA/PROVENTIL HFA/VENTOLIN HFA) 108 (90 Base) MCG/ACT inhaler Inhale 2 puffs into the lungs every 4 hours as needed for shortness of breath, wheezing or cough 18 g 3    buprenorphine (BUTRANS) 20 MCG/HR WK patch Place 1 patch onto the skin every 7 days 4 patch 2    exemestane (AROMASIN) 25 MG tablet Take 1 tablet (25 mg) by mouth daily 90 tablet 3    gabapentin (NEURONTIN) 300 MG capsule Take 2 capsules in the morning, 2 capsules, and 3 capsules at bedtime. 210 capsule 1    loperamide (IMODIUM) 2 MG capsule Start with 2 caps (4 mg), then take one cap (2 mg) after each diarrheal stool as needed. Do not use more than 8 caps (16 mg) per day. 30 capsule 0    methocarbamol (ROBAXIN) 500 MG tablet Take 2-3 tablets (1,000-1,500 mg) by mouth 3 times daily as needed for muscle spasms 210 tablet 2    methylPREDNISolone (MEDROL) 32 MG tablet  Take 1 tablet (32 mg) by mouth daily 12 hours prior to the procedure with IV contrast 2 tablet 0    naloxone (NARCAN) 4 MG/0.1ML nasal spray Spray 1 spray (4 mg) into one nostril alternating nostrils once as needed for opioid reversal every 2-3 minutes until assistance arrives 0.2 mL 0    pantoprazole (PROTONIX) 40 MG EC tablet Take 1 tablet (40 mg) by mouth every morning (before breakfast) (Patient taking differently: Take 40 mg by mouth daily as needed) 30 tablet 1    polyethylene glycol (MIRALAX) 17 GM/Dose powder Take 17 g by mouth daily as needed for constipation 578 g 3    prochlorperazine (COMPAZINE) 10 MG tablet Take 1 tablet (10 mg) by mouth every 6 hours as needed for nausea or vomiting 30 tablet 2    prochlorperazine (COMPAZINE) 10 MG tablet Take 1 tablet (10 mg) by mouth every 6 hours as needed for nausea or vomiting 30 tablet 3    QUEtiapine (SEROQUEL) 400 MG tablet Take 1 tablet (400 mg) by mouth at bedtime 30 tablet 2    QUEtiapine (SEROQUEL) 50 MG tablet May take 1-2 tablets ( mg) by mouth nightly as needed (for symptoms of hypomania/overnight for sleep). May also take 0.5-1 tablets (25-50 mg) daily as needed (for symptoms of hypomania/overnight for sleep). May take up to 2 tablets (100mg) in addition to 400mg tablet at bedtime for total dose of 500mg for break-through symptoms as well as a 50mg PRN overnight for sleep/hypomania symptoms 60 tablet 2    rivaroxaban ANTICOAGULANT (XARELTO) 20 MG TABS tablet Take 1 tablet (20 mg) by mouth daily (with dinner) 90 tablet 3    SENNA-docusate sodium (SENNA S) 8.6-50 MG tablet Take 2 tablets by mouth 2 times daily as needed (Constipation) 100 tablet 3    sertraline (ZOLOFT) 50 MG tablet Take 1 tablet (50 mg) by mouth daily 30 tablet 2    Vitamin D3 (CHOLECALCIFEROL) 25 mcg (1000 units) tablet Take 1 tablet (25 mcg) by mouth daily 90 tablet 3    ondansetron (ZOFRAN) 8 MG tablet Take 1 tablet (8 mg) by mouth every 8 hours as needed for nausea (Patient not  taking: Reported on 2/6/2024) 30 tablet 3        Labs and Data         7/6/2022    12:56 PM 10/17/2022    10:42 AM 12/8/2023     2:24 PM   PROMIS-10 Total Score w/o Sub Scores   PROMIS TOTAL - SUBSCORES 12 9 15          No data to display                  12/8/2023     2:21 PM 2/9/2024     3:14 PM 2/23/2024     2:48 PM   PHQ-9 SCORE   PHQ-9 Total Score MyChart 11 (Moderate depression) 19 (Moderately severe depression)    PHQ-9 Total Score 11 19 11         7/6/2022    12:54 PM 4/10/2023    10:07 AM 12/8/2023     2:26 PM   JENNIFFER-7 SCORE   Total Score 13 (moderate anxiety) 21 (severe anxiety) 20 (severe anxiety)   Total Score 13 21 20       Liver/Kidney Function, TSH Metabolic Blood counts   Recent Labs   Lab Test 02/15/24  1518 02/01/24  0817   AST 12 29   ALT <5 23   ALKPHOS 101 112   CR 0.98* 1.05*     Recent Labs   Lab Test 03/16/23  1650   TSH 0.80    Recent Labs   Lab Test 01/05/23  1301   CHOL 150   TRIG 114   LDL 69   HDL 58     Recent Labs   Lab Test 01/05/23  1301   A1C 6.4*     Recent Labs   Lab Test 02/15/24  1518   *    Recent Labs   Lab Test 02/15/24  1518   WBC 4.5   HGB 10.7*   HCT 31.2*   *              ECG 10/19/23 QTc = 423ms    PROVIDER: Boris Taylor MD    Level of Medical Decision Making:   - At least 1 chronic problem that is not stable  - Engaged in prescription drug management during visit (discussed any medication benefits, side effects, alternatives, etc.)       Psychiatry Individual Psychotherapy Note   Psychotherapy start time - Not done at this appointment  Psychotherapy end time - Not done at this appointment  Date treatment plan last reviewed with patient - 12/04/23  Subjective: This supportive psychotherapy session addressed issues related to goals of therapy and current psychosocial stressors. Patient's reaction: Preparatory in the context of mental status appropriate for ambulatory setting.    Interactive complexity indicated? No  Plan: RTC in timeframe noted  above  Psychotherapy services during this visit included myself and the patient.   Treatment Plan      SYMPTOMS; PROBLEMS   MEASURABLE GOALS;    FUNCTIONAL IMPROVEMENT / GAINS INTERVENTIONS DISCHARGE CRITERIA   Depression: depressed mood, low energy, feeling hopelesss, and excessive crying  Anxiety: excessive worry, feeling fearful, and nervous/overwhelmed  Psychosis: auditory hallucinations   reduce depressive symptoms, find enjoyment at least once a day, learn best practices for sleep, reduce panic attacks/ excessive worry, and identify coping strategies for AH Supportive / psychodynamic marked symptom improvement, reduced visit frequency, and can transfer back to primary care       Patient staffed in clinic with Dr. Woodruff who will sign the note.  Supervisor is Dr. Tyson.

## 2024-02-23 ENCOUNTER — VIRTUAL VISIT (OUTPATIENT)
Dept: PSYCHIATRY | Facility: CLINIC | Age: 49
End: 2024-02-23
Attending: PSYCHIATRY & NEUROLOGY
Payer: MEDICARE

## 2024-02-23 VITALS — WEIGHT: 233 LBS | HEIGHT: 72 IN | BODY MASS INDEX: 31.56 KG/M2

## 2024-02-23 DIAGNOSIS — F25.0 SCHIZOAFFECTIVE DISORDER, BIPOLAR TYPE (H): Primary | ICD-10-CM

## 2024-02-23 PROCEDURE — 99214 OFFICE O/P EST MOD 30 MIN: CPT | Mod: GC

## 2024-02-23 RX ORDER — QUETIAPINE FUMARATE 50 MG/1
TABLET, FILM COATED ORAL
Qty: 60 TABLET | Refills: 2 | Status: SHIPPED | OUTPATIENT
Start: 2024-02-23 | End: 2024-03-15

## 2024-02-23 ASSESSMENT — PAIN SCALES - GENERAL: PAINLEVEL: SEVERE PAIN (6)

## 2024-02-23 ASSESSMENT — PATIENT HEALTH QUESTIONNAIRE - PHQ9: SUM OF ALL RESPONSES TO PHQ QUESTIONS 1-9: 11

## 2024-02-23 NOTE — NURSING NOTE
Is the patient currently in the state of [place]? YES    Visit mode:VIDEO    If the visit is dropped, the patient can be reconnected by: VIDEO VISIT: Text to cell phone:   Telephone Information:   Mobile [phone]       Will anyone else be joining the visit? NO  (If patient encounters technical issues they should call [phone]    How would you like to obtain your AVS? MyChart    Are changes needed to the allergy or medication list? No    Reason for visit: RECHECK    Mary CUELLAR

## 2024-02-23 NOTE — PATIENT INSTRUCTIONS
**For crisis resources, please see the information at the end of this document**   Patient Education    Thank you for coming to the [facility] [place] [facility].     Lab Testing:  If you had lab testing today and your results are reassuring or normal they will be mailed to you or sent through [facility] within 7 days. If the lab tests need quick action we will call you with the results. The phone number we will call with results is # [phone]. If this is not the best number please call our clinic and change the number.     Medication Refills:  If you need any refills please call your pharmacy and they will contact us. Our fax number for refills is [phone].   Three business days of notice are needed for general medication refill requests.   Five business days of notice are needed for controlled substance refill requests.   If you need to change to a different pharmacy, please contact the new pharmacy directly. The new pharmacy will help you get your medications transferred.     Contact Us:  Please call [phone] during business hours (8-5:00 M-F).   If you have medication related questions after clinic hours, or on the weekend, please call [phone].     Financial Assistance [phone]   Medical Records [phone]       MENTAL HEALTH CRISIS RESOURCES:  For a emergency help, please call 911 or go to the nearest Emergency Department.     Emergency Walk-In Options:   [facility] Unit @ [facility] ([place]): [phone] - Specialized mental health emergency area designed to be calming  [facility] ([place]): [phone]  [facility] ([place]): [phone]  [facility]): [phone]    [facility] Crisis Information:   [place]: [phone]  [place]: [phone]  [place] ([place]) - Adult: [phone]     Child: [phone]  [place] - Adult: [phone]     Child: [phone]  [place]:  [phone]  List of all [facility] resources:   https://[place].gov/dhs/people-we-serve/adults/health-care/mental-health/resources/crisis-contacts.jsp    National Crisis Information:   Crisis Text Line: Text  [place]  to 501225  Suicide & Crisis Lifeline: 988  National Suicide Prevention Lifeline: 6-337-004-TALK (1-150.751.9556)       For online chat options, visit https://suicidepreventionlifeline.org/chat/  Poison Control Center: 6-261-138-7644  Trans Lifeline: 9-895-237-0383 - Hotline for transgender people of all ages  [employer]: 5-527-540-2230 - Hotline for LGBT youth     For Non-Emergency Support:   Fast Tracker: Mental Health & Substance Use Disorder Resources -   https://www.[employer].org/

## 2024-02-26 ENCOUNTER — TELEPHONE (OUTPATIENT)
Dept: PALLIATIVE CARE | Facility: CLINIC | Age: 49
End: 2024-02-26
Payer: MEDICARE

## 2024-02-26 NOTE — TELEPHONE ENCOUNTER
Retail Pharmacy Prior Authorization Team   Phone: [phone]    PA Initiation    Medication: BUPRENORPHINE 20 MCG/HR TD PTWK  Insurance Company: [employer] Part D - Phone [phone] Fax [phone]  Pharmacy Filling the Rx: [id number] [address] [facility] [address] [facility]  Filling Pharmacy Phone: [phone]  Filling Pharmacy Fax:    Start Date: 2/26/2024

## 2024-02-27 NOTE — TELEPHONE ENCOUNTER
PRIOR AUTHORIZATION DENIED    Medication: BUPRENORPHINE 20 MCG/HR TD PTWK  Insurance Company: [employer] Part D - Phone [phone] Fax [phone]  Denial Date: 2/26/2024  Denial Reason(s):  (Previous approval for 10 mcg was only approved for an opioid, but the medication itself was denied) Patient must try/fail tramadol ER        Appeal Information:

## 2024-02-29 ENCOUNTER — PATIENT OUTREACH (OUTPATIENT)
Dept: CARE COORDINATION | Facility: CLINIC | Age: 49
End: 2024-02-29
Payer: MEDICARE

## 2024-02-29 NOTE — PROGRESS NOTES
Social Work - Telephone/Insurance Noodlet message  [facility]  Data:   Patient Name: Teresa Sanderson  Goes By: Teresa    /Age: 1975 (48 year old)      Referral Source: patient    Reason for Referral: returning patient's call with regards to county paperwork    Intervention: SW received a call from patient today. SW made 4 attempts at different times during the day to reach patient by phone. Unable to leave message due to patient is not able to check their voicemail messages.     Plan:  will await patient's return phone call/message and provide assistance at that time.      Jo CORTEZ, [facility]  - Oncology  Phone : [phone]  Pager: [phone]

## 2024-03-02 ENCOUNTER — HEALTH MAINTENANCE LETTER (OUTPATIENT)
Age: 49
End: 2024-03-02

## 2024-03-04 NOTE — TELEPHONE ENCOUNTER
Prior Authorization Not Needed per Insurance-Duplicate of 1/3/24 encounter which was denied. Appeal is attached to the original encounter.     Medication: BUPRENORPHINE 20 MCG/HR TD PTWK  Insurance Company: [employer] Part D - Phone [phone] Fax [phone]  Expected CoPay: $    Pharmacy Filling the Rx: [id number] - [facility] [address]  Pharmacy Notified:   Patient Notified:

## 2024-03-04 NOTE — TELEPHONE ENCOUNTER
Retail Pharmacy Prior Authorization Team   Phone: [phone]    Medication Appeal Initiation    Medication: BUPRENORPHINE 10 MCG/HR TD PTWK  Appeal Start Date:  3/4/2024  Insurance Company: [employer]  Insurance Phone:   Insurance Fax: [phone]  Comments:

## 2024-03-05 ENCOUNTER — TELEPHONE (OUTPATIENT)
Dept: ONCOLOGY | Facility: CLINIC | Age: 49
End: 2024-03-05
Payer: MEDICARE

## 2024-03-05 NOTE — TELEPHONE ENCOUNTER
Oral Chemotherapy Monitoring Program    Subjective/Objective:  Teresa Sanderson is a 48 year old female contacted by phone for a follow-up visit for oral chemotherapy. Teresa was in good spirits today and did not report any new side-effects or concerns regarding safety/efficacy of their abemaciclib (Verzenio). No GI distress with N/V/D. They have been adherent with the dose and regimen and did not report starting any new medications as of recently. Furthermore, they report getting adequate nutrition/ water intake, and daily activity in their routine.        11/22/2023     8:00 AM 1/16/2024     2:00 PM 1/25/2024     1:00 PM 2/1/2024    10:00 AM 2/5/2024     9:00 AM 2/14/2024     8:00 AM 3/5/2024    10:00 AM   ORAL CHEMOTHERAPY   Assessment Type Refill Discontinuation Initial Follow up Lab Monitoring Initial Follow up Refill Monthly Follow up   Stop Date  1/15/2024        Reason for Discontinuation  Disease progression        Diagnosis Code Breast Cancer Breast Cancer Breast Cancer Breast Cancer Breast Cancer Breast Cancer Breast Cancer   Providers Dr. Dimitrios Plunkett   [facility]   Is this patient followed by the [facility] OC team? Yes Yes No No No No No   Drug Name Ibrance (palbociclib) Ibrance (palbociclib) Verzenio (abemaciclib) Verzenio (abemaciclib) Verzenio (abemaciclib) Verzenio (abemaciclib) Verzenio (abemaciclib)   Dose 125 mg 125 mg 150 mg 150 mg 150 mg 150 mg 150 mg   Current Schedule Daily Daily BID BID BID BID BID   Cycle Details 3 weeks on, 1 week off 3 weeks on, 1 week off Continuous Continuous Continuous Continuous Continuous   Start Date of Last Cycle   1/24/2024 1/24/2024 1/24/2024     Planned next cycle start date 11/30/2023         Doses missed in last 2 weeks       1   Adherence Assessment       Adherent   Adverse Effects     No AE identified during assessment  No  AE identified during assessment   Is the dose as ordered appropriate for the patient?     Yes         Last PHQ-2 Score on record:       2/23/2024     2:48 PM 10/4/2022    10:02 AM   PHQ-2 ( 1999 Pfizer)   Q1: Little interest or pleasure in doing things 2 3   Q2: Feeling down, depressed or hopeless 1 3   PHQ-2 Score 3 6   PHQ-2 Total Score (12-17 Years)- Positive if 3 or more points; Administer PHQ-A if positive 3        Vitals:  BP:   BP Readings from Last 1 Encounters:   02/15/24 (!) 153/95     Wt Readings from Last 1 Encounters:   02/15/24 106.1 kg (234 lb)     Estimated body surface area is 2.32 meters squared as calculated from the following:    Height as of 2/23/24: 1.829 m (6').    Weight as of 2/23/24: 105.7 kg (233 lb).    Labs:  _  Result Component Current Result Ref Range   Sodium 143 (2/15/2024) 135 - 145 mmol/L     _  Result Component Current Result Ref Range   Potassium 3.6 (2/15/2024) 3.4 - 5.3 mmol/L     _  Result Component Current Result Ref Range   Calcium 9.2 (2/15/2024) 8.6 - 10.0 mg/dL     _  Result Component Current Result Ref Range   Albumin 3.9 (2/15/2024) 3.5 - 5.2 g/dL     _  Result Component Current Result Ref Range   Urea Nitrogen 14.4 (2/15/2024) 6.0 - 20.0 mg/dL     _  Result Component Current Result Ref Range   Creatinine 0.98 (H) (2/15/2024) 0.51 - 0.95 mg/dL     _  Result Component Current Result Ref Range   AST 12 (2/15/2024) 0 - 45 U/L     _  Result Component Current Result Ref Range   ALT <5 (2/15/2024) 0 - 50 U/L     _  Result Component Current Result Ref Range   Bilirubin Total <0.2 (2/15/2024) <=1.2 mg/dL     _  Result Component Current Result Ref Range   WBC Count 4.5 (2/15/2024) 4.0 - 11.0 10e3/uL     _  Result Component Current Result Ref Range   Hemoglobin 10.7 (L) (2/15/2024) 11.7 - 15.7 g/dL     _  Result Component Current Result Ref Range   Platelet Count 151 (2/15/2024) 150 - 450 10e3/uL     _  Result Component Current Result Ref Range   Absolute Neutrophils 2.7  (2/15/2024) 1.6 - 8.3 10e3/uL      Assessment/Plan:  Because there were no adverse drug events or concerning findings today during our conversation, Cleaster should continue to take their abemaciclib (Verzenio) as scheduled. Reminded patient of upcoming appointments and encouraged them to call us if any questions come up.    Follow-Up:  3/19 Labs/provider appointment with Alee @ [id number]    Refill Due:  3/13/24    Mayank Jauregui  Pharmacy Intern  Oral Chemotherapy Monitoring Program  [facility]  [phone]

## 2024-03-07 ENCOUNTER — PATIENT OUTREACH (OUTPATIENT)
Dept: CARE COORDINATION | Facility: CLINIC | Age: 49
End: 2024-03-07
Payer: MEDICARE

## 2024-03-07 NOTE — PROGRESS NOTES
Social Work - Telephone/MyChart message  [facility]  Data:   Patient Name: Teresa Sanderson  Goes By: Teresa    /Age: 1975 (48 year old)      Referral Source:  patient  Reason for Referral: county paperwork     Intervention: Sent patient MyChart message on 2024 and Unable to leave message due to patient is unable to check voicemail messages. My chart message sent to notify patient that paperwork will be mailed to them as SW has not been able to reach patient by phone to coordinate them picking paperwork up from clinic .   Plan:  will await patient's return phone call/message and provide assistance at that time.      Jo CORTEZ, [facility]  - Oncology  Phone : [phone]  Pager: [phone]

## 2024-03-11 NOTE — TELEPHONE ENCOUNTER
I tried to reach appeals and was transferred 5 times without talking to anyone who could help. I refaxed this appeal to [facility] for the 2nd time.

## 2024-03-11 NOTE — PROGRESS NOTES
Virtual Visit Details    Type of service:  Video Visit   Video Start Time:  3:11 PM  Video End Time: 3:44 PM    Originating Location (pt. Location): Home    Distant Location (provider location):  On-site  Platform used for Video Visit: [facility]  MEDICAL PROGRESS NOTE     CARE TEAM:    PCP- Physician No Ref-Primary  Therapist- None    Teresa is a 48 year old who uses the pronouns she, her, hers.      Diagnoses     Schizoaffective disorder, bipolar type (previously diagnosed with bipolar disorder), MRE hypomanic    Bereavement  Metastatic breast cancer     Assessment     Teresa was seen today for follow-up and ongoing medication management. Issues discussed are included below:    - Depression/Anxiety: Reports persistent increase in anxiety with her current array of psychosocial stressors and the upcoming anniversary of her son's death, which is a marked trigger for her. Has been trying to work with her care coordinator to address the most prominent issues she has been contending with (insurance, in particular), but these have continued to be a source of stress. At time of rooming today did report 10/10 chest pain that was passed along to triage initially before Teresa adamantly stating it was anxiety related. Reviewed the symptoms in-depth at the time today's assessment and she reported that it was transient, resolved quickly, and was associated with a period of marked anxiety, feeling more like a pressure than pain in her chest on further review. Advised that it would be best to get the chest sensation evaluated in the ED but she declined; Teresa did agree that if the chest pain were to come back she will go to the emergency room to get an evaluation. Also agreed to contact her cancer care team to give them an update about the symptoms she has been experiencing recently. Unclear if chest pain could be related to her  "chemotherapeutic care regimen. For primary management of anxiety/dysregulation during the day, will plan to utilize Seroquel PRN's as below.    - Schizoaffective Disorder: There has been some more general improvement in overall concern for tom, but level of acute distress in response to psychosocial stressors and anniversaries coming up has been producing more marked dysregulation and agitation. Still experiencing sleep dysregulation (but improved overall quantity), and feeling of being \"out of control\" at times. Has been having trouble using the PRN Seroquel in the middle of the night as she is concerned that it will make her feel fatigued in the morning. Given acute distress during the day, discussed shifting the PRN Seroquel to be 25-50mg BID PRN to allow for more regular use when experiencing acute distress and agitation during the day. Teresa was in agreement with this plan. Given level of distress today, will continue to follow closely for any indications of decompensation as while current array of symptoms appears less tied to possible tom than at past appointments (no increased goal-directed behavior, impulsivity, elevated mood), the possibility for current symptoms to push in that direction remains high.    Future Considerations:  - Consider switching Seroquel to alternative SGA if no improvement with dose optimization  - Increase in sertraline    Psychotropic Drug Interactions:  [PSYCHCLINICDDI]  ADDITIVE SEDATION: Butrans, Robaxin, Seroquel  Management: Routine Monitoring, refer for MTM  for evaluation for DDI with full array of medications    [place] was not [place] today:  writer not prescribing controlled substances at this time    Risk Statements:   Treatment Risk- Risks, benefits, alternatives and potential adverse effects have been discussed and are understood.   Safety Risk-[place] did not appear to be an imminent safety risk to self or others.     Plan     1) Medications:   - continue " "scheduled Seroquel 400mg at bedtime   - Shift daytime Seroquel 25-50mg  to BID PRN  - continue sertraline 50mg daily     Prescribed by Heme Onc/Other Providers:  - Butrans 5mcg/hr wk patch  - Gabapentin 600 mg in the morning, 600 mg at 2 pm and 600 mg at bedtime  - Robaxin 500 mg TID PRN for muscle spasms  - Rivaroxaban 20mg tablet daily    2) Psychotherapy: Writer to contact prior therapist about re-establishing vs referral to new provider for individual therapy    3) Next due:  Labs- SGA monitoring labs due as of 2024  EKG- Routine monitoring is not indicated for current psychotropic medication regimen   Rating scales- AIMS: due at next in-person appointment    4) Referrals: Therapy- Health psychology appointment scheduled - 2024    5) Other:  Team check-in call in 1-2 weeks    6) Follow-up: Return to clinic in 4 weeks     Pertinent Background                                                   [most recent eval 23]   Medical: Diagnosed with L breast cancer in Dec 2020 after presenting with a few mos of back pain. Mets to L4, L5, left iliac bone as well as paraspinal mass. 2021 started Ibrance, zoladex, and anastrozole; 21- s/p radiofrequency ablation, keloplasty/ segmental plasty of L4; 2021- showing complete response to treatment.  Psych: Lifetime history of \"rapid mood shifts\". S/P 7-8 hospitalizations, all in [place] except the 1st at [facility] [place] . Seroquel started at that time, took 100mg TID x yrs. Details in eval from said period. Hospitalization [id number] is discussed in eval and documents what sounds like a full manic episode. CA dx'd 2020. Zoloft started after the cancer dx. Seroquel 100mg was stopped 2021 (not helping), doxepin and Ambien tried for sleep-neither helped. Son  2022 by accidental overdose.   Meds: Seroquel x years 100mg-300mg was helpful but 300mg too sedating & became  less effective over time; Invega for a short period; Zyprexa was helpful; no " "Haldol or Risperdal; Lithium did not help (? 2 yrs ago) same for Depakote; trazodone; no HOWELL     Pertinent Items Include: suicidal ideation, psychosis, tom, mutiple   psychotropic trials, trauma hx, violent behavior, psych hosps, cocaine 2019. Last hosp  Nov 2021.     Subjective     States that she was having intense chest pain earlier in the day today, but states that this is due to anxiety and is something that she has experienced in the past when she has been experiencing high levels of stress. Has been experiencing a lot of \"bone pain\" as well and has wondered if it might be due to her injections. Affirms that when she starts doing deep breathing and thinks about positive things her chest pain tends to then resolve. Does notice some shortness of breath when her chest hurts. No radiation of chest pain. Primary sensation is one of \"pressure\", rather than pain necessarily. States that once in [place] when she had similar symptoms the ED told her that she was having an anxiety attack at the time. This episode feels similar to the one in [place] when they told her that she was having an anxiety attack. Adamantly states that she thinks it is anxiety related.    When sleeping, feels like she is asleep but her \"head isn't asleep\" and like her head is still awake. More emotional dysregulation since her last appointment. Has woken up when feeling distressed and called her sister while crying. Primary triggers more recently have been her son's upcoming birthday/death day and that is a very big source of distress for her.    Quality of sleep has been variable. Some nights sleeps well, but others does not. Feels like it is directly tied to feelings of anxiety and distress tied to anniversary of son's death and dealing with her current psychosocial stressors.    No reported SI/HI or other safety concerns at this time.    Recent Psych Symptoms:   Depression:  depressed mood, low energy, insomnia, feeling hopeless, excessive " "crying, overwhelmed, and mood dysregulation  Elevated:  increased energy, decreased sleep need, increased activity, distractibility , and racing thoughts - less prevalent since last appointment, symptoms now more associated with anxiety and dysregulation  Psychosis:  auditory hallucinations - not since last appointment  Anxiety:  feeling fearful and nervous/overwhelmed  Trauma Related:   Medical trauma/stressors, anxiety increased when thinking about medical procedures and results  Insomnia:  Yes: intermittent difficulty with both initiation and maintenance due to worries at night - somewhat improve with increased Seroquel  Other:  No    Current Social History:  Financial/occupational: on disability for finances currently  Living situation (partner, children, pets, etc): Rents an apartment, son has a room with her but doesn't stay there often, 1 cat (\"Toejoe\")  Social/spiritual support: Prayer is important, best friend in [place], sister in [place], son  Feels safe at home: Yes    Pertinent Substance Use:   Alcohol: Rarely, maybe once every other month   Cannabis: Infrequently - 1-2x per month for refractory pain  Tobacco: Yes: about 6-7 (often doesn't finish them)  Caffeine:  Yes: about 4-5 cans of coke per day, 1 cup of coffee in morning - recently reducing this intake  Opioids: Yes: prescribed   Narcan Kit current: Yes  Other substances: none    Medical Review of Systems:   Lightheadedness/orthostasis: None  Headaches: Very rare tension headaches  GI: none  Sexual health concerns: None    A comprehensive review of systems was performed and is negative other than noted above.    Contraception: \"tubes are tied\"     Mental Status Exam     Alertness: alert  and oriented  Appearance: adequately groomed  Behavior/Demeanor:  agitated and distressed but able to be redirected , with good  eye contact   Speech: increased rate and increased volume when feeling most distressed  Language: intact and no problems  Psychomotor:  " Persistent rocking while seated  Mood: depressed, anxious, and worried  Affect: tearful and distressed ; congruent to: mood- yes, content- yes  Thought Process/Associations: unremarkable  Thought Content:  Reports none;  Denies suicidal & violent ideation and delusions and paranoid ideation  Perception:  Reports auditory hallucinations without commands [details in history];  Denies visual hallucinations  Insight: fair - open to review and discussion of symptoms across diagnosis, but some difficulty with specific identification of symptoms potentially related to schizoaffective disorder  Judgment: fair  Cognition: does  appear grossly intact; formal cognitive testing was not done  Gait and Station: N/A (telehealth)     Past Psych Med Trials      Medication Max Dose (mg) Dates / Duration Helpful? DC Reason / Adverse Effects?   Seroquel 400mg  yes Max dose of 500mg via 400mg QHS plus 50mg BID PRN   Zoloft 450mg  yes    Gabapentin 600mg TID  ?    Olanzapine 5mg BID      Depakote   no    lithium   no    Invega    Only brief trial   Ambien          Vitals   Ht 1.829 m (6')   Wt 106.1 kg (234 lb)   BMI 31.74 kg/m    Pulse Readings from Last 3 Encounters:   02/15/24 78   02/01/24 87   01/18/24 62     Wt Readings from Last 3 Encounters:   03/15/24 106.1 kg (234 lb)   02/23/24 105.7 kg (233 lb)   02/15/24 106.1 kg (234 lb)     BP Readings from Last 3 Encounters:   02/15/24 (!) 153/95   02/01/24 (!) 138/92   01/18/24 (!) 153/101        Medical History     ALLERGIES: Contrast dye    Patient Active Problem List   Diagnosis    Breast mass    Spine metastasis    Urinary tract infection with hematuria    Malignant neoplasm of overlapping sites of left breast in female, estrogen receptor positive (H)    Carcinoma of left breast metastatic to bone (H)    Metastasis to bone (H)    Pain of right lower leg    Malignant neoplasm of female breast, unspecified estrogen receptor status, unspecified laterality, unspecified site of breast  (H)    Schizoaffective disorder, bipolar type (H)    Insomnia, unspecified type        Medications     Current Outpatient Medications   Medication Sig Dispense Refill    abemaciclib (VERZENIO) 150 MG tablet Take 1 tablet (150 mg) by mouth 2 times daily for 28 days 56 tablet 0    acetaminophen (TYLENOL) 500 MG tablet Take 500-1,000 mg by mouth every 6 hours as needed for mild pain      albuterol (PROAIR HFA/PROVENTIL HFA/VENTOLIN HFA) 108 (90 Base) MCG/ACT inhaler Inhale 2 puffs into the lungs every 4 hours as needed for shortness of breath, wheezing or cough 18 g 3    buprenorphine (BUTRANS) 20 MCG/HR WK patch Place 1 patch onto the skin every 7 days 4 patch 2    exemestane (AROMASIN) 25 MG tablet Take 1 tablet (25 mg) by mouth daily 90 tablet 3    gabapentin (NEURONTIN) 300 MG capsule Take 2 capsules in the morning, 2 capsules, and 3 capsules at bedtime. 210 capsule 1    loperamide (IMODIUM) 2 MG capsule Start with 2 caps (4 mg), then take one cap (2 mg) after each diarrheal stool as needed. Do not use more than 8 caps (16 mg) per day. 30 capsule 0    methocarbamol (ROBAXIN) 500 MG tablet Take 2-3 tablets (1,000-1,500 mg) by mouth 3 times daily as needed for muscle spasms 210 tablet 2    methylPREDNISolone (MEDROL) 32 MG tablet Take 1 tablet (32 mg) by mouth daily 12 hours prior to the procedure with IV contrast 2 tablet 0    naloxone (NARCAN) 4 MG/0.1ML nasal spray Spray 1 spray (4 mg) into one nostril alternating nostrils once as needed for opioid reversal every 2-3 minutes until assistance arrives 0.2 mL 0    polyethylene glycol (MIRALAX) 17 GM/Dose powder Take 17 g by mouth daily as needed for constipation 578 g 3    prochlorperazine (COMPAZINE) 10 MG tablet Take 1 tablet (10 mg) by mouth every 6 hours as needed for nausea or vomiting 30 tablet 2    prochlorperazine (COMPAZINE) 10 MG tablet Take 1 tablet (10 mg) by mouth every 6 hours as needed for nausea or vomiting 30 tablet 3    QUEtiapine (SEROQUEL) 400 MG  tablet Take 1 tablet (400 mg) by mouth at bedtime 30 tablet 2    QUEtiapine (SEROQUEL) 50 MG tablet Take 0.5-1 tablets (25-50 mg) by mouth 2 times daily as needed (for sleep or acute agitation) 60 tablet 2    rivaroxaban ANTICOAGULANT (XARELTO) 20 MG TABS tablet Take 1 tablet (20 mg) by mouth daily (with dinner) 90 tablet 3    SENNA-docusate sodium (SENNA S) 8.6-50 MG tablet Take 2 tablets by mouth 2 times daily as needed (Constipation) 100 tablet 3    sertraline (ZOLOFT) 50 MG tablet Take 1 tablet (50 mg) by mouth daily 30 tablet 2    Vitamin D3 (CHOLECALCIFEROL) 25 mcg (1000 units) tablet Take 1 tablet (25 mcg) by mouth daily 90 tablet 3    ondansetron (ZOFRAN) 8 MG tablet Take 1 tablet (8 mg) by mouth every 8 hours as needed for nausea (Patient not taking: Reported on 2/6/2024) 30 tablet 3    pantoprazole (PROTONIX) 40 MG EC tablet Take 1 tablet (40 mg) by mouth every morning (before breakfast) (Patient not taking: Reported on 3/15/2024) 30 tablet 1        Labs and Data         7/6/2022    12:56 PM 10/17/2022    10:42 AM 12/8/2023     2:24 PM   PROMIS-10 Total Score w/o Sub Scores   PROMIS TOTAL - SUBSCORES 12 9 15          No data to display                  12/8/2023     2:21 PM 2/9/2024     3:14 PM 2/23/2024     2:48 PM   PHQ-9 SCORE   PHQ-9 Total Score MyChart 11 (Moderate depression) 19 (Moderately severe depression)    PHQ-9 Total Score 11 19 11         7/6/2022    12:54 PM 4/10/2023    10:07 AM 12/8/2023     2:26 PM   JENNIFFER-7 SCORE   Total Score 13 (moderate anxiety) 21 (severe anxiety) 20 (severe anxiety)   Total Score 13 21 20       Liver/Kidney Function, TSH Metabolic Blood counts   Recent Labs   Lab Test 02/15/24  1518 02/01/24  0817   AST 12 29   ALT <5 23   ALKPHOS 101 112   CR 0.98* 1.05*     Recent Labs   Lab Test 03/16/23  1650   TSH 0.80    Recent Labs   Lab Test 01/05/23  1301   CHOL 150   TRIG 114   LDL 69   HDL 58     Recent Labs   Lab Test 01/05/23  1301   A1C 6.4*     Recent Labs   Lab Test  02/15/24  1518   *    Recent Labs   Lab Test 02/15/24  1518   WBC 4.5   HGB 10.7*   HCT 31.2*   *              ECG 10/19/23 QTc = 423ms    PROVIDER: Boris Taylor MD    Level of Medical Decision Making:   - At least 1 chronic problem that is not stable  - Engaged in prescription drug management during visit (discussed any medication benefits, side effects, alternatives, etc.)       Psychiatry Individual Psychotherapy Note   Psychotherapy start time - 1515  Psychotherapy end time - 1535  Date treatment plan last reviewed with patient - 12/04/23  Subjective: This supportive psychotherapy session addressed issues related to goals of therapy and current psychosocial stressors. Patient's reaction: Preparatory in the context of mental status appropriate for ambulatory setting.    Interactive complexity indicated? Yes, visit entailed Interactive Complexity evidenced by:  -The need to manage maladaptive communication (related to, e.g., high anxiety, high reactivity, repeated questions, or disagreement) among participants that complicates delivery of care  Plan: RTC in timeframe noted above  Psychotherapy services during this visit included myself and the patient.   Treatment Plan      SYMPTOMS; PROBLEMS   MEASURABLE GOALS;    FUNCTIONAL IMPROVEMENT / GAINS INTERVENTIONS DISCHARGE CRITERIA   Depression: depressed mood, low energy, feeling hopelesss, and excessive crying  Anxiety: excessive worry, feeling fearful, and nervous/overwhelmed  Trauma Related: intrusive memories, trauma trigger psychological / physiological response, and mood dysregulation  Psychosis: auditory hallucinations and disorganized behavior  Dysregulation: mood dysregulation and physically agitated   reduce depressive symptoms, find enjoyment at least once a day, learn best practices for sleep, reduce panic attacks/ excessive worry, make a plan to manage 2-3 anxiety-provoking situations, reduce feeling overwhelmed/ improve decision  making skills, develop 2 strategies to cope with trauma triggers/intrusive memories, reduce manic/hypomanic episodes, and identify coping strategies for AH Supportive / psychodynamic marked symptom improvement, reduced visit frequency, and can transfer back to primary care     Patient staffed in clinic with Dr. Woodruff who will sign the note.  Supervisor is Dr. Tyson.

## 2024-03-12 ENCOUNTER — PATIENT OUTREACH (OUTPATIENT)
Dept: CARE COORDINATION | Facility: CLINIC | Age: 49
End: 2024-03-12
Payer: MEDICARE

## 2024-03-12 NOTE — PROGRESS NOTES
Social Work - Follow-Up  [facility]    Data/Intervention:    Patient Name: Teresa Sanderson Goes By: Teresa    /Age: 1975 (48 year old)    Reason for Follow-Up:  [facility] paperwork    Collaborated With:    -patient  -    Intervention/Education/Resources Provided:  SW responded to patient's my chart message sent on 3/7/24. SW mailed [facility] paperwork as well as consent to communicate, a list of emergency resources for financial assistance and market RX mobile market schedule.     SW discussed in my chart message best ways for patient and Sw to communicate.     SW offered to place a FRW referral to assist patient with MA renewal process and offered to talk with patient further about other financial assistance they may need.     Assessment/Plan:  SW will await patient's response and follow up either by phone or my chart.     Previously provided patient/family with writer's contact information and availability.      Jo CORTEZ, [facility] Center[facility]  - Oncology  Phone : [phone]  Pager: [phone]

## 2024-03-13 DIAGNOSIS — C79.51 CARCINOMA OF LEFT BREAST METASTATIC TO BONE (H): Primary | ICD-10-CM

## 2024-03-13 DIAGNOSIS — C50.912 CARCINOMA OF LEFT BREAST METASTATIC TO BONE (H): Primary | ICD-10-CM

## 2024-03-14 DIAGNOSIS — Z17.0 MALIGNANT NEOPLASM OF OVERLAPPING SITES OF LEFT BREAST IN FEMALE, ESTROGEN RECEPTOR POSITIVE (H): ICD-10-CM

## 2024-03-14 DIAGNOSIS — C50.812 MALIGNANT NEOPLASM OF OVERLAPPING SITES OF LEFT BREAST IN FEMALE, ESTROGEN RECEPTOR POSITIVE (H): ICD-10-CM

## 2024-03-14 DIAGNOSIS — C50.912 CARCINOMA OF LEFT BREAST METASTATIC TO BONE (H): Primary | ICD-10-CM

## 2024-03-14 DIAGNOSIS — C79.51 CARCINOMA OF LEFT BREAST METASTATIC TO BONE (H): Primary | ICD-10-CM

## 2024-03-14 RX ORDER — LAMOTRIGINE 25 MG/1
500 TABLET ORAL ONCE
Status: CANCELLED | OUTPATIENT
Start: 2024-03-14 | End: 2024-03-14

## 2024-03-15 ENCOUNTER — VIRTUAL VISIT (OUTPATIENT)
Dept: PSYCHIATRY | Facility: CLINIC | Age: 49
End: 2024-03-15
Attending: PSYCHIATRY & NEUROLOGY
Payer: MEDICARE

## 2024-03-15 ENCOUNTER — NURSE TRIAGE (OUTPATIENT)
Dept: NURSING | Facility: CLINIC | Age: 49
End: 2024-03-15

## 2024-03-15 VITALS — BODY MASS INDEX: 31.69 KG/M2 | HEIGHT: 72 IN | WEIGHT: 234 LBS

## 2024-03-15 DIAGNOSIS — C50.912 CARCINOMA OF LEFT BREAST METASTATIC TO BONE (H): Primary | ICD-10-CM

## 2024-03-15 DIAGNOSIS — F25.0 SCHIZOAFFECTIVE DISORDER, BIPOLAR TYPE (H): ICD-10-CM

## 2024-03-15 DIAGNOSIS — C79.51 CARCINOMA OF LEFT BREAST METASTATIC TO BONE (H): Primary | ICD-10-CM

## 2024-03-15 DIAGNOSIS — C50.812 MALIGNANT NEOPLASM OF OVERLAPPING SITES OF LEFT BREAST IN FEMALE, ESTROGEN RECEPTOR POSITIVE (H): ICD-10-CM

## 2024-03-15 DIAGNOSIS — Z17.0 MALIGNANT NEOPLASM OF OVERLAPPING SITES OF LEFT BREAST IN FEMALE, ESTROGEN RECEPTOR POSITIVE (H): ICD-10-CM

## 2024-03-15 PROCEDURE — 99214 OFFICE O/P EST MOD 30 MIN: CPT | Mod: 95

## 2024-03-15 RX ORDER — QUETIAPINE FUMARATE 50 MG/1
25-50 TABLET, FILM COATED ORAL 2 TIMES DAILY PRN
Qty: 60 TABLET | Refills: 2 | Status: SHIPPED | OUTPATIENT
Start: 2024-03-15 | End: 2024-06-21

## 2024-03-15 ASSESSMENT — PAIN SCALES - GENERAL: PAINLEVEL: WORST PAIN (10)

## 2024-03-15 NOTE — NURSING NOTE
Is the patient currently in the state of [place]? YES    Visit mode:VIDEO    If the visit is dropped, the patient can be reconnected by: VIDEO VISIT: Text to cell phone:   Telephone Information:   Mobile [phone]       Will anyone else be joining the visit? NO  (If patient encounters technical issues they should call [phone]    How would you like to obtain your AVS? MyChart    Are changes needed to the allergy or medication list? No    Reason for visit: RECHECK    Mary CUELLAR

## 2024-03-15 NOTE — TELEPHONE ENCOUNTER
48 year old transferred to triage -chest pain  She states she is not currently having chest pain but had it earlier today.  Has had it off and on the last few days She states the pain is across her chest  It does not radiate. It lasted about 15 minutes today. She denies SOB,     She was able to take deep breaths and try to relax and it goes away  She kept going on about how it is related to stress and anxiety She has had this before and was taking something over the counter for it at [facility].  She currently has an appt with psych that she did not want to miss    Recommended she go to the ER  for evaluation     Also has metastatic breast cancer dx 12/2020; widespread bone mets. S/p RT to lumbar spine and RFA/kyphoplasty L4 5/2021. Important to be evaluated      She is going to meet with psychiatrist  encouraged to go to ER if chest pain returns         Reason for Disposition   Patient sounds very sick or weak to the triager    Additional Information   Negative: Followed an injury to chest   Negative: SEVERE chest pain   Negative: Pain also in shoulder(s) or arm(s) or jaw   Negative: Difficulty breathing   Negative: Cocaine use within last 3 days   Negative: Major surgery in the past month   Negative: Hip or leg fracture (broken bone) in past month (or had cast on leg or ankle in past month)   Negative: Illness requiring prolonged bedrest in past month (e.g., immobilization, long hospital stay)   Negative: Long-distance travel in past month (e.g., car, bus, train, plane; with trip lasting 6 or more hours)   Negative: History of prior 'blood clot' in leg or lungs (i.e., deep vein thrombosis, pulmonary embolism)   Negative: History of inherited increased risk of blood clots (e.g., Factor 5 Leiden, Anti-thrombin 3, Protein C or Protein S deficiency, Prothrombin mutation)   Negative: Cancer treatment in the past two months (or has cancer now)   Negative: Heart beating irregularly or very rapidly    Answer  "Assessment - Initial Assessment Questions  1. LOCATION: \"Where does it hurt?\"        Across chest  2. RADIATION: \"Does the pain go anywhere else?\" (e.g., into neck, jaw, arms, back)      no  3. ONSET: \"When did the chest pain begin?\" (Minutes, hours or days)       Off and on for 3 days  4. PATTERN: \"Does the pain come and go, or has it been constant since it started?\"  \"Does it get worse with exertion?\"       Comes and goes  5. DURATION: \"How long does it last\" (e.g., seconds, minutes, hours)      15 minutes  6. SEVERITY: \"How bad is the pain?\"  (e.g., Scale 1-10; mild, moderate, or severe)     - MILD (1-3): doesn't interfere with normal activities      - MODERATE (4-7): interferes with normal activities or awakens from sleep     - SEVERE (8-10): excruciating pain, unable to do any normal activities       Woke up out of sleep     7. CARDIAC RISK FACTORS: \"Do you have any history of heart problems or risk factors for heart disease?\" (e.g., angina, prior heart attack; diabetes, high blood pressure, high cholesterol, smoker, or strong family history of heart disease)      smokes  8. PULMONARY RISK FACTORS: \"Do you have any history of lung disease?\"  (e.g., blood clots in lung, asthma, emphysema, birth control pills)      no  9. CAUSE: \"What do you think is causing the chest pain?\"      Anxiety  many issues going on   10. OTHER SYMPTOMS: \"Do you have any other symptoms?\" (e.g., dizziness, nausea, vomiting, sweating, fever, difficulty breathing, cough)        Occasionally nauseated     11. PREGNANCY: \"Is there any chance you are pregnant?\" \"When was your last menstrual period?\"        no    Protocols used: Chest Pain-A-[place]    "

## 2024-03-15 NOTE — PATIENT INSTRUCTIONS
**For crisis resources, please see the information at the end of this document**   Patient Education    Thank you for coming to the [facility] [place] [facility].     Lab Testing:  If you had lab testing today and your results are reassuring or normal they will be mailed to you or sent through [email or web address] within 7 days. If the lab tests need quick action we will call you with the results. The phone number we will call with results is # [phone]. If this is not the best number please call our clinic and change the number.     Medication Refills:  If you need any refills please call your pharmacy and they will contact us. Our fax number for refills is [phone].   Three business days of notice are needed for general medication refill requests.   Five business days of notice are needed for controlled substance refill requests.   If you need to change to a different pharmacy, please contact the new pharmacy directly. The new pharmacy will help you get your medications transferred.     Contact Us:  Please call [phone] during business hours (8-5:00 M-F).   If you have medication related questions after clinic hours, or on the weekend, please call [phone].     Financial Assistance [phone]   Medical Records [phone]       MENTAL HEALTH CRISIS RESOURCES:  For a emergency help, please call 911 or go to the nearest Emergency Department.     Emergency Walk-In Options:   Em[facility] Unit @ [facility] ([place]): [phone] - Specialized mental health emergency area designed to be calming  [facility] ([place]): [phone]  [facility] ([place]): [phone]  [facility]): [phone]    [facility] Crisis Information:   [place]: [phone]  [place]: [phone]  [place] ([place]) - Adult: [phone]     Child: [phone]  [place] - Adult: [phone]     Child: [phone]  [place]:  [phone]  List of all [facility] resources:   https://[place].gov/dhs/people-we-serve/adults/health-care/mental-health/resources/crisis-contacts.jsp    National Crisis Information:   Crisis Text Line: Text  [place]  to 849410  Suicide & Crisis Lifeline: 988  National Suicide Prevention Lifeline: 8-312-250-TALK (1-499.219.9114)       For online chat options, visit https://suicidepreventionlifeline.org/chat/  Poison Control Center: 6-245-963-4823  Trans Lifeline: 5-119-582-5720 - Hotline for transgender people of all ages  The [employer]: 2-315-444-9300 - Hotline for LGBT youth     For Non-Emergency Support:   Fast Tracker: Mental Health & Substance Use Disorder Resources -   https://www.4tiitoock[employer] Entertainmentn.org/

## 2024-03-22 ENCOUNTER — TELEPHONE (OUTPATIENT)
Dept: PSYCHIATRY | Facility: CLINIC | Age: 49
End: 2024-03-22
Payer: MEDICARE

## 2024-03-22 NOTE — TELEPHONE ENCOUNTER
"Writer placed a phone call to Teresa to check-in as recommended by Dr. Taylor. She initially reported this last week has \"gotten better, but is still on and off.\"    Writer specifically inquired about her sleep. She reports this has been a significant struggle. For example, she took Seroquel 400 mg last night and never fell asleep. She took 50 mg approximately one hour before this writer called and said she has been \"snoozing\" but does not feel she's actually sleeping.     Teresa also reports her anxiety is still present. She is still having frequent crying spells and mentions she's really struggling with the upcoming anniversary of her son's death. Although, she reports the chest pain and other physical symptoms have been non-existent since she met with Dr. Taylor on 3/15.     Regarding tom, the patient denies elevated mood, impulsivity, or risky behaviors. Although, she feels her mood is still dysregulated. She again mentioned the frequent crying spells.    Patient confirms her psychosocial stressors are still impacting her mental state. She's feeling quite anxious about all of her missed medical appts. Teresa reports this is due to issues with her CADI waiver and insurance. Teresa said a worker came out to her home last Friday and informed her they are trying to speed up this process and resolve the issue, so she can resume her medical appts ASAP.    During the conversation, Teresa's speech was a normal rate and rhythm. Her thought process was organized and linear, and she answered this writer's questions appropriately. Will update Dr. Taylor and see what he recommends for Teresa at this time. Of note, she does not have a future appointment with Dr. Taylor. The previous visit was by video.    Melany Ramirez RN on 3/22/2024 at 10:19 AM    "

## 2024-03-22 NOTE — TELEPHONE ENCOUNTER
Boris Taylor MD  P Psychiatry Nurse-[facility] Team,     Sorry for sending this so far after, but would it be possible to give Cleaster a call to check in sometime this week or next? She has been having a lot of stressors going on lately and was in a tough spot last Friday. I think that she is stable overall, but I think a quick call to check on how things are going would help a lot. Main things we are trying to monitor right now are sleep and support for psychosocial stressors.     Thank you all for the help!     Best,   Boris

## 2024-03-22 NOTE — TELEPHONE ENCOUNTER
Writer called the patient and offered an appt for next Friday, 3/29. Teresa agreed to a video visit on 3/29 at 2:30 pm. Message sent to scheduling. Patient was instructed to leave her medications as is for now. Confirmed she can reach out to this clinic if she has any concerns prior to that visit, which she voiced understanding of.    Melany Ramirez RN on 3/22/2024 at 11:33 AM

## 2024-03-26 NOTE — TELEPHONE ENCOUNTER
"Writer spoke with pt on her mobile number. Pt stated she missed her appt yesterday with Dr. Tyson d/t being at an appt with her Oncology treatment team and not being able to arrive on time.     Pt described her depression 8/10. \"It's 2 in the afternoon and I haven't left my bed yet\". Pt is experiencing deep grieving d/t the loss of her son. Additionally, imaging tests revealed a new cancerous nodule on her L breast. Pt also endorses anxiety 7/10.   Pt denied thoughts of self/other harm. \"I'm not gonna lie. In the beginning I didn't want to live anymore. It was too much pain to bear. But I'm better now. I don't wanna hurt myself\". Pt stated she has a good support system in her friends. Pt continues to take Zyprexa and Zoloft as prescribed. Pt also stated she's open to medication changes as she'll willing to \"try anything that can be helpful\".     Writer provided active listening and validated pt's feelings. Writer also indicated to pt that she can call the clinic at anytime if she's feeling distressed and/or unsafe. Writer's direct number was provided as well.   Will route this note to provider for review and recommendation. Will also continue to follow up and assist pt as needed.   Teresa Sofia RN        " 26-Mar-2024 21:17

## 2024-03-28 ENCOUNTER — TELEPHONE (OUTPATIENT)
Dept: PALLIATIVE CARE | Facility: CLINIC | Age: 49
End: 2024-03-28

## 2024-03-28 NOTE — TELEPHONE ENCOUNTER
Previous appeal was received too late for review. Per Jo at [facility], new PA request must be sent.    Retail Pharmacy Prior Authorization Team   Phone: [phone]    PA Initiation    Medication: BUPRENORPHINE 20 MCG/HR TD PTWK  Insurance Company: [employer] Part D - Phone [phone] Fax [phone]  Pharmacy Filling the Rx: [id number] [place] [address] [address] [facility] AT [address] [place]  Filling Pharmacy Phone: [phone]  Filling Pharmacy Fax:    Start Date: 3/28/2024

## 2024-03-28 NOTE — TELEPHONE ENCOUNTER
I spoke to Jo at [facility] Appeals. She states this was dismissed because the appeal was received 1 day too late. I needed to be received at [facility] by 3/3/24. We now need to reinitiate a new PA. I will close this encounter and open a new one.     Prior Authorization Not Needed per Insurance-This was closed because it missed the deadline.    Medication: BUPRENORPHINE 10 MCG/HR TD PTWK  Insurance Company: [employer] Part D - Phone [phone] Fax [phone]  Expected CoPay: $    Pharmacy Filling the Rx: [id number] - [facility] [place], [place] - [address] AT [address] [place], [place] [address]  Pharmacy Notified:   Patient Notified:

## 2024-04-02 NOTE — TELEPHONE ENCOUNTER
Prior Authorization Approval-Approved on the 10 mcg PA    Medication: BUPRENORPHINE 20 MCG/HR TD PTWK  Authorization Effective Date: 3/29/2024  Authorization Expiration Date: 12/31/2024  Approved Dose/Quantity:   Reference #: [id number]   Insurance Company: [employer] Part D - Phone [phone] Fax [phone]  Expected CoPay: $    CoPay Card Available:      Financial Assistance Needed:   Which Pharmacy is filling the prescription: [id number] - [facility] [place], [place] - [address]  Pharmacy Notified: Yes  Patient Notified:

## 2024-04-07 NOTE — PROGRESS NOTES
Oncology Visit:   Date on this visit: Apr 8, 2024    Diagnosis:  ER positive left breast cancer metastasized to bones.     Primary Physician: No Ref-Primary, Physician     History Of Present Illness:    Ms. Sanderson is a 48 year old female with a h/o tobacco abuse and DVTs with left breast cancer metastasized to bone. She presented to [place] ED with back pain on 12/5/2020. MRI of the L-spine showed an abnormal L4 lesion with associated right paraspinal mass, abnormalities in L5 and the left iliac bone were also seen. CT C/A/P showed a left breast mass, lytic lesions of T7, L4, and the pelvis, and a 3 cm lesion in the kidney (thought to be a cyst). Ultrasound of the bilateral lower extremities showed a non-occlusive thrombus in the left popliteal vein. Mammogram and ultrasound of the bilateral breasts on 12/17/2020 showed a spiculated mass measuring at least 7.8 cm at 12-1:00 left breast extending from the nipple to 9 cm from the nipple with associated nipple retraction. This mass was biopsied, and showed IDC with surrounding DCIS, grade 3, ER+ 90%, and GA+ 75%.  HER2 was equivocal in approximately 35% of tumor cells by FISH and was negative by IHC.    Metastatic Breast Cancer Treatment:  12/23/2021 - 1/7/2021  Radiation (3000 cGy) to the lumbar spine.  1/29/2021 - present  Ibrance, zoladex, and anastrozole.  5/17/2021 radiofrequency ablation, kyphoplasty to L4  8/20/2021  Bilateral salpingo-oophorectomies, Ibrance and anastrozole.  She was off anastrozole 12/2021 - 2/2022 and off Ibrance 01/2022 - 02/2022 due to stress and impending homelessness. Off both again 03/2022-04/2022 due to death of her son but restarted in 05/2022.  04/2023  PET/CT with progression in 2 small metastases in the left pelvis.  Palbociclib continued.  Anastrozole changed to exemestane  5/5/2023  Completed radiation, 2000 cGy in 5 fractions, to the left ilium.  12/19/2023 Left breast biopsy  [facility] NGS:   ER positive, 3+  100%   GA positive,  1+, 5%   HER2 negative.   AR positive, 1+, 35%   MMR proficient   PD-L1 negative, CPS 0   PTEN positive, 1+ 100%  *Of note, all of the above was IHC, DNA quantity not sufficient for NGS  24 Fulvestrant + abemaciclib     Interval History:   Teresa comes into clinic for metastatic breast cancer follow up.  She is on treatment with Verzenio and fulvestrant.  She missed her appointment and fulvestrant injection in March.  She states she has been under a great deal of stress.  Her brother just .  Unfortunately, he had poorly controlled diabetes and ultimately had a heart attack.  She reminds me that another brother  in  and her son in .  Her brother's  is Saturday in [place].  A family member has offered to help pay to fly her out for the , but she has a fear of flying and is uncertain as to whether she will be able to do it.  She has also had significant financial stress.  Due to inability to afford rent on her house, she changed her Medicaid health insurance.  With the change she is having increased co-pays on medical services and medications.  She is working to change it back.  Her car was recently re-possessed.  Her daughter is trying to figure out how to get it back.  In the meantime, she has had to rely on others including a neighbor to take her to appointments.  She states she is very depressed and due to this, she has difficulty getting out of bed.  She notes she is on treatment with sertraline and routinely sees her psychiatrist.  She is going to discuss with him at their appointment this week whether any medication changes should be made.    She states throughout, she has been diligent about taking her Verzenio.  She confirms she is taking it twice a day.  She has an intermittent episode of diarrhea.  She denies abdominal cramping, blood in the stool, or stool incontinence.  The diarrhea occurs in single episodes and is not bothersome enough to take antidiarrheal medicine.   She has no nausea.  She denies new bone or joint aches or pains.  She has no cough, shortness of breath, or chest pain.  She continues to have quite bothersome low to mid back pain that prevents her from being able to stand for a prolonged period of time.  She also ambulates with a walker around the home due to the back pain.  She occasionally will take an 800 mg dose of ibuprofen.  She has tried smoking marijuana but this did not help her bone pain and she didn't like how it made her feel.  She denies new bone or joint pains.    Past Medical/Surgical History:   Past Medical History:   Diagnosis Date    Anxiety     Breast CA (H) 12/2020    Depression     DVT (deep venous thrombosis) (H) 2014    Left breast mass     x approximately 4-5 months    Pulmonary emboli (H)     Pyelonephritis     Schizoaffective disorder (H)     Tobacco use      Past Surgical History:   Procedure Laterality Date    COLONOSCOPY N/A 7/8/2022    Procedure: COLONOSCOPY, WITH POLYPECTOMY;  Surgeon: Ham Cano MD;  Location: [facility] OR    IR LUMBAR KYPHOPLASTY VERTEBRAE  5/17/2021    LAPAROSCOPIC SALPINGO-OOPHORECTOMY Bilateral 8/20/2021    Procedure: BILATERAL SALPINGO-OOPHORECTOMY, LAPAROSCOPIC;  Surgeon: Rory Lopez MD;  Location: [facility] OR    TUBAL LIGATION  1998        Allergies   Allergen Reactions    Contrast Dye      Pt developed nausea after isovue 370 injection on 6/9/21        Current Outpatient Medications   Medication Sig Dispense Refill    abemaciclib (VERZENIO) 150 MG tablet Take 1 tablet (150 mg) by mouth 2 times daily for 28 days 56 tablet 0    acetaminophen (TYLENOL) 500 MG tablet Take 500-1,000 mg by mouth every 6 hours as needed for mild pain      albuterol (PROAIR HFA/PROVENTIL HFA/VENTOLIN HFA) 108 (90 Base) MCG/ACT inhaler Inhale 2 puffs into the lungs every 4 hours as needed for shortness of breath, wheezing or cough 18 g 3    buprenorphine (BUTRANS) 20 MCG/HR WK patch Place 1 patch onto the skin every 7 days 4  patch 2    exemestane (AROMASIN) 25 MG tablet Take 1 tablet (25 mg) by mouth daily 90 tablet 3    gabapentin (NEURONTIN) 300 MG capsule Take 2 capsules in the morning, 2 capsules, and 3 capsules at bedtime. 210 capsule 1    loperamide (IMODIUM) 2 MG capsule Start with 2 caps (4 mg), then take one cap (2 mg) after each diarrheal stool as needed. Do not use more than 8 caps (16 mg) per day. 30 capsule 0    methocarbamol (ROBAXIN) 500 MG tablet Take 2-3 tablets (1,000-1,500 mg) by mouth 3 times daily as needed for muscle spasms 210 tablet 2    methylPREDNISolone (MEDROL) 32 MG tablet Take 1 tablet (32 mg) by mouth daily 12 hours prior to the procedure with IV contrast 2 tablet 0    naloxone (NARCAN) 4 MG/0.1ML nasal spray Spray 1 spray (4 mg) into one nostril alternating nostrils once as needed for opioid reversal every 2-3 minutes until assistance arrives 0.2 mL 0    ondansetron (ZOFRAN) 8 MG tablet Take 1 tablet (8 mg) by mouth every 8 hours as needed for nausea (Patient not taking: Reported on 2/6/2024) 30 tablet 3    pantoprazole (PROTONIX) 40 MG EC tablet Take 1 tablet (40 mg) by mouth every morning (before breakfast) (Patient not taking: Reported on 3/15/2024) 30 tablet 1    polyethylene glycol (MIRALAX) 17 GM/Dose powder Take 17 g by mouth daily as needed for constipation 578 g 3    prochlorperazine (COMPAZINE) 10 MG tablet Take 1 tablet (10 mg) by mouth every 6 hours as needed for nausea or vomiting 30 tablet 2    prochlorperazine (COMPAZINE) 10 MG tablet Take 1 tablet (10 mg) by mouth every 6 hours as needed for nausea or vomiting 30 tablet 3    QUEtiapine (SEROQUEL) 400 MG tablet Take 1 tablet (400 mg) by mouth at bedtime 30 tablet 2    QUEtiapine (SEROQUEL) 50 MG tablet Take 0.5-1 tablets (25-50 mg) by mouth 2 times daily as needed (for sleep or acute agitation) 60 tablet 2    rivaroxaban ANTICOAGULANT (XARELTO) 20 MG TABS tablet Take 1 tablet (20 mg) by mouth daily (with dinner) 90 tablet 3     SENNA-docusate sodium (SENNA S) 8.6-50 MG tablet Take 2 tablets by mouth 2 times daily as needed (Constipation) 100 tablet 3    sertraline (ZOLOFT) 50 MG tablet Take 1 tablet (50 mg) by mouth daily 30 tablet 2    Vitamin D3 (CHOLECALCIFEROL) 25 mcg (1000 units) tablet Take 1 tablet (25 mcg) by mouth daily 90 tablet 3     No current facility-administered medications for this visit.     Facility-Administered Medications Ordered in Other Visits   Medication Dose Route Frequency Provider Last Rate Last Admin    lidocaine 1% with EPINEPHrine 1:100,000 injection 10 mL  10 mL Intradermal Once Sangeetha Vazquez MD       '  Physical Exam:   BP (!) 155/97   Pulse 72   Temp 97.8  F (36.6  C) (Oral)   Resp 20   Wt 110.2 kg (243 lb)   SpO2 100%   BMI 32.96 kg/m    General:  Well appearing adult female in NAD.  Alert and oriented x 3.  HEENT:  Normocephalic.  Sclera anicteric. MMM.    Lymph:  No palpable cervical, supraclavicular, or axillary LAD.   Chest:  CTA bilaterally.  No wheezes or crackles.  CV:  RRR. No audible m/r/g.  Abd:  Soft/ND/NT   Ext:  No edema of the bilateral lower extremities.    Neuro:  Cranial nerves grossly intact.  Alert and oriented x 3.  Psych:  Mood and affect appear normal.    Skin:  Skin over the bilateral lower extremities is dry.    Laboratory/Imaging Studies:   I personally reviewed the below laboratories:    Electrolytes are wnl.  Creatinine is elevated at 1.46 mg/dL  GFR is low at 44 mL/min  Liver tests are wnl.    WBC is wnl. ANC is wnl at 3.0.  Hemoglobin is low at 10.3 g/dL  Platelets are wnl.    ASSESSMENT/PLAN:   48 year old female with history of DVT and ER positive left breast cancer metastasized to bones.    1.  Metastatic breast cancer: PET/CT in 12/2023 c/w disease progression in the left breast and bones including T7, left ilium, and left posterior acetabulum.  On treatment with abemaciclib and fulvestrant since 01/2024.      - Has not received fulvestrant since 2/15/2024.   Spoke to pharmacy, she does not need reloading and can resume once every 4 week injections.  Emphasized the medication is not effective unless there is compliance with dosing. She understands this.   Will administer injection today and again in 4 weeks.  - Continue abemaciclib 150 mg PO BID  - Plan to obtain PET/CT in 2 months to evaluate response to this treatment.    2.  Bone metastases/low back pain with LLE sciatica:  She is s/p XRT to the left ilium as well as the lumbar spine and kyphoplasty of L4--there was some extravasation of cement which procedularist is aware of and recommended continued AC indefinitely.   - buprenorphine 20 mcg/hr patch, Robaxin, and gabapentin.  Ongoing follow up with palliative medicine.  - Pain mostly continues in low back.  - Recommend holding further ibuprofen given today's kidney function.  - Receiving Zometa once every 3 months.  Next due around 5/1/24.     3.  Schizoaffective disorder, bipolar type: She is on treatment with Seroquel and Zoloft. She notes another manic episode since our last visit where she was yelling at family members on the phone.  She also notes recent exacerbation of depression in the setting of her brother's death.  Ongoing follow up with psychiatry.   She has an appointment this week and will discuss medications at that visit.    4.  DVT:  No change since last visit.  She continues on Xarelto.    5. Diarrhea: G1. Secondary to abemaciclib.  Imodium prn for 3 loose stools in over 24 hours.     6.  Stress/financial strain:  She is working with a county  on her [place], but would like to touch base with our  as well.  Referral placed today.    7.  FELTON:  Creatinine elevated at 1.46 mg/dL, GFR reduced to 44 mL/min.  Recommend stopping ibuprofen.  Also recommend drinking 48 - 64 oz of water per day.    Follow Up:  Labs and fulvestrant in 4 weeks and 8 weeks  Visit with Alee De Los Santos in 4 weeks.  Labs and Zometa around 5/1/2024  (okay to schedule the same day as another visit)  Visit with me in 8 weeks.  PET/CT at least 2 business days prior to visit with me.    I spent 45 minutes spent on the date of the encounter doing chart review, review of test results, interpretation of tests, patient visit, and documentation.

## 2024-04-08 ENCOUNTER — TELEPHONE (OUTPATIENT)
Dept: PSYCHIATRY | Facility: CLINIC | Age: 49
End: 2024-04-08

## 2024-04-08 ENCOUNTER — ONCOLOGY VISIT (OUTPATIENT)
Dept: ONCOLOGY | Facility: CLINIC | Age: 49
End: 2024-04-08
Attending: INTERNAL MEDICINE
Payer: MEDICARE

## 2024-04-08 ENCOUNTER — LAB (OUTPATIENT)
Dept: LAB | Facility: CLINIC | Age: 49
End: 2024-04-08
Payer: MEDICARE

## 2024-04-08 VITALS
DIASTOLIC BLOOD PRESSURE: 97 MMHG | RESPIRATION RATE: 20 BRPM | HEART RATE: 72 BPM | TEMPERATURE: 97.8 F | WEIGHT: 243 LBS | OXYGEN SATURATION: 100 % | SYSTOLIC BLOOD PRESSURE: 155 MMHG | BODY MASS INDEX: 32.96 KG/M2

## 2024-04-08 DIAGNOSIS — T45.1X5A CHEMOTHERAPY INDUCED DIARRHEA: ICD-10-CM

## 2024-04-08 DIAGNOSIS — C50.912 CARCINOMA OF LEFT BREAST METASTATIC TO BONE (H): ICD-10-CM

## 2024-04-08 DIAGNOSIS — K52.1 CHEMOTHERAPY INDUCED DIARRHEA: ICD-10-CM

## 2024-04-08 DIAGNOSIS — C79.51 CARCINOMA OF LEFT BREAST METASTATIC TO BONE (H): ICD-10-CM

## 2024-04-08 DIAGNOSIS — N17.9 AKI (ACUTE KIDNEY INJURY) (H): ICD-10-CM

## 2024-04-08 DIAGNOSIS — C50.812 MALIGNANT NEOPLASM OF OVERLAPPING SITES OF LEFT BREAST IN FEMALE, ESTROGEN RECEPTOR POSITIVE (H): ICD-10-CM

## 2024-04-08 DIAGNOSIS — Z91.041 CONTRAST MEDIA ALLERGY: ICD-10-CM

## 2024-04-08 DIAGNOSIS — Z59.86 FINANCIAL INSECURITY DUE TO MEDICAL EXPENSES: ICD-10-CM

## 2024-04-08 DIAGNOSIS — F25.0 SCHIZOAFFECTIVE DISORDER, BIPOLAR TYPE (H): ICD-10-CM

## 2024-04-08 DIAGNOSIS — Z17.0 MALIGNANT NEOPLASM OF OVERLAPPING SITES OF LEFT BREAST IN FEMALE, ESTROGEN RECEPTOR POSITIVE (H): Primary | ICD-10-CM

## 2024-04-08 DIAGNOSIS — Z17.0 MALIGNANT NEOPLASM OF OVERLAPPING SITES OF LEFT BREAST IN FEMALE, ESTROGEN RECEPTOR POSITIVE (H): ICD-10-CM

## 2024-04-08 DIAGNOSIS — C50.812 MALIGNANT NEOPLASM OF OVERLAPPING SITES OF LEFT BREAST IN FEMALE, ESTROGEN RECEPTOR POSITIVE (H): Primary | ICD-10-CM

## 2024-04-08 DIAGNOSIS — G89.3 CANCER ASSOCIATED PAIN: ICD-10-CM

## 2024-04-08 LAB
ALBUMIN SERPL BCG-MCNC: 4.1 G/DL (ref 3.5–5.2)
ALP SERPL-CCNC: 90 U/L (ref 40–150)
ALT SERPL W P-5'-P-CCNC: 14 U/L (ref 0–50)
ANION GAP SERPL CALCULATED.3IONS-SCNC: 10 MMOL/L (ref 7–15)
AST SERPL W P-5'-P-CCNC: 16 U/L (ref 0–45)
BASOPHILS # BLD AUTO: 0 10E3/UL (ref 0–0.2)
BASOPHILS NFR BLD AUTO: 1 %
BILIRUB SERPL-MCNC: 0.2 MG/DL
BUN SERPL-MCNC: 14.6 MG/DL (ref 6–20)
CALCIUM SERPL-MCNC: 9.5 MG/DL (ref 8.6–10)
CHLORIDE SERPL-SCNC: 107 MMOL/L (ref 98–107)
CREAT SERPL-MCNC: 1.46 MG/DL (ref 0.51–0.95)
DEPRECATED HCO3 PLAS-SCNC: 26 MMOL/L (ref 22–29)
EGFRCR SERPLBLD CKD-EPI 2021: 44 ML/MIN/1.73M2
EOSINOPHIL # BLD AUTO: 0 10E3/UL (ref 0–0.7)
EOSINOPHIL NFR BLD AUTO: 1 %
ERYTHROCYTE [DISTWIDTH] IN BLOOD BY AUTOMATED COUNT: 14.4 % (ref 10–15)
GLUCOSE SERPL-MCNC: 104 MG/DL (ref 70–99)
HCT VFR BLD AUTO: 31 % (ref 35–47)
HGB BLD-MCNC: 10.3 G/DL (ref 11.7–15.7)
IMM GRANULOCYTES # BLD: 0 10E3/UL
IMM GRANULOCYTES NFR BLD: 0 %
LYMPHOCYTES # BLD AUTO: 1.4 10E3/UL (ref 0.8–5.3)
LYMPHOCYTES NFR BLD AUTO: 30 %
MCH RBC QN AUTO: 34.9 PG (ref 26.5–33)
MCHC RBC AUTO-ENTMCNC: 33.2 G/DL (ref 31.5–36.5)
MCV RBC AUTO: 105 FL (ref 78–100)
MONOCYTES # BLD AUTO: 0.2 10E3/UL (ref 0–1.3)
MONOCYTES NFR BLD AUTO: 5 %
NEUTROPHILS # BLD AUTO: 3 10E3/UL (ref 1.6–8.3)
NEUTROPHILS NFR BLD AUTO: 63 %
NRBC # BLD AUTO: 0 10E3/UL
NRBC BLD AUTO-RTO: 0 /100
PLATELET # BLD AUTO: 163 10E3/UL (ref 150–450)
POTASSIUM SERPL-SCNC: 3.8 MMOL/L (ref 3.4–5.3)
PROT SERPL-MCNC: 7.6 G/DL (ref 6.4–8.3)
RBC # BLD AUTO: 2.95 10E6/UL (ref 3.8–5.2)
SODIUM SERPL-SCNC: 143 MMOL/L (ref 135–145)
WBC # BLD AUTO: 4.7 10E3/UL (ref 4–11)

## 2024-04-08 PROCEDURE — 99000 SPECIMEN HANDLING OFFICE-LAB: CPT | Performed by: PATHOLOGY

## 2024-04-08 PROCEDURE — 96402 CHEMO HORMON ANTINEOPL SQ/IM: CPT | Performed by: INTERNAL MEDICINE

## 2024-04-08 PROCEDURE — 250N000011 HC RX IP 250 OP 636: Mod: JZ | Performed by: PHYSICIAN ASSISTANT

## 2024-04-08 PROCEDURE — 99215 OFFICE O/P EST HI 40 MIN: CPT | Performed by: INTERNAL MEDICINE

## 2024-04-08 PROCEDURE — 85025 COMPLETE CBC W/AUTO DIFF WBC: CPT | Performed by: PATHOLOGY

## 2024-04-08 PROCEDURE — G0463 HOSPITAL OUTPT CLINIC VISIT: HCPCS | Performed by: INTERNAL MEDICINE

## 2024-04-08 PROCEDURE — 86300 IMMUNOASSAY TUMOR CA 15-3: CPT | Performed by: PHYSICIAN ASSISTANT

## 2024-04-08 PROCEDURE — 82378 CARCINOEMBRYONIC ANTIGEN: CPT | Performed by: PHYSICIAN ASSISTANT

## 2024-04-08 PROCEDURE — 36415 COLL VENOUS BLD VENIPUNCTURE: CPT | Performed by: PATHOLOGY

## 2024-04-08 PROCEDURE — 80053 COMPREHEN METABOLIC PANEL: CPT | Performed by: PATHOLOGY

## 2024-04-08 RX ORDER — LAMOTRIGINE 25 MG/1
500 TABLET ORAL ONCE
Status: COMPLETED | OUTPATIENT
Start: 2024-04-08 | End: 2024-04-08

## 2024-04-08 RX ORDER — METHYLPREDNISOLONE 32 MG/1
TABLET ORAL
Qty: 2 TABLET | Refills: 0 | Status: SHIPPED | OUTPATIENT
Start: 2024-04-08 | End: 2024-07-20

## 2024-04-08 RX ADMIN — FULVESTRANT 500 MG: 50 INJECTION, SOLUTION INTRAMUSCULAR at 17:44

## 2024-04-08 SDOH — ECONOMIC STABILITY - INCOME SECURITY: FINANCIAL INSECURITY: Z59.86

## 2024-04-08 ASSESSMENT — PAIN SCALES - GENERAL: PAINLEVEL: SEVERE PAIN (6)

## 2024-04-08 NOTE — TELEPHONE ENCOUNTER
Patient sent [employer] message to the  [facility] pool that she has canceled appointments due to recent stressors, loss of her brother, and anniversary of her son's death. Writer called Teresa to check-in and offer an appt with Dr. Taylor today at 2 pm. No answer. Left a brief VM informing Cleiris that Dr. Taylor has an opening at 2 pm this afternoon if she would like to meet with him. Confirmed this can be in-person or by video. Writer will also send her a [employer] message with this information.    Melany Ramirez RN on 4/8/2024 at 10:03 AM

## 2024-04-08 NOTE — LETTER
4/8/2024         RE: Teresa Sanderson  [address]  [address]  [facility] [address]        Dear Colleague,    Thank you for referring your patient, Teresa Sanderson, to the [facility]. Please see a copy of my visit note below.    Oncology Visit:   Date on this visit: Apr 8, 2024    Diagnosis:  ER positive left breast cancer metastasized to bones.     Primary Physician: No Ref-Primary, Physician     History Of Present Illness:    Ms. Sanderson is a 48 year old female with a h/o tobacco abuse and DVTs with left breast cancer metastasized to bone. She presented to [place] ED with back pain on 12/5/2020. MRI of the L-spine showed an abnormal L4 lesion with associated right paraspinal mass, abnormalities in L5 and the left iliac bone were also seen. CT C/A/P showed a left breast mass, lytic lesions of T7, L4, and the pelvis, and a 3 cm lesion in the kidney (thought to be a cyst). Ultrasound of the bilateral lower extremities showed a non-occlusive thrombus in the left popliteal vein. Mammogram and ultrasound of the bilateral breasts on 12/17/2020 showed a spiculated mass measuring at least 7.8 cm at 12-1:00 left breast extending from the nipple to 9 cm from the nipple with associated nipple retraction. This mass was biopsied, and showed IDC with surrounding DCIS, grade 3, ER+ 90%, and ND+ 75%.  HER2 was equivocal in approximately 35% of tumor cells by FISH and was negative by IHC.    Metastatic Breast Cancer Treatment:  12/23/2021 - 1/7/2021  Radiation (3000 cGy) to the lumbar spine.  1/29/2021 - present  Ibrance, zoladex, and anastrozole.  5/17/2021 radiofrequency ablation, kyphoplasty to L4  8/20/2021  Bilateral salpingo-oophorectomies, Ibrance and anastrozole.  She was off anastrozole 12/2021 - 2/2022 and off Ibrance 01/2022 - 02/2022 due to stress and impending homelessness. Off both again 03/2022-04/2022 due to death of her son but restarted in 05/2022.  04/2023  PET/CT  with progression in 2 small metastases in the left pelvis.  Palbociclib continued.  Anastrozole changed to exemestane  2023  Completed radiation, 2000 cGy in 5 fractions, to the left ilium.  2023 Left breast biopsy  [facility] NGS:   ER positive, 3+  100%   KY positive, 1+, 5%   HER2 negative.   AR positive, 1+, 35%   MMR proficient   PD-L1 negative, CPS 0   PTEN positive, 1+ 100%  *Of note, all of the above was IHC, DNA quantity not sufficient for NGS  24 Fulvestrant + abemaciclib     Interval History:   Teresa comes into clinic for metastatic breast cancer follow up.  She is on treatment with Verzenio and fulvestrant.  She missed her appointment and fulvestrant injection in March.  She states she has been under a great deal of stress.  Her brother just .  Unfortunately, he had poorly controlled diabetes and ultimately had a heart attack.  She reminds me that another brother  in  and her son in .  Her brother's  is Saturday in [place].  A family member has offered to help pay to fly her out for the , but she has a fear of flying and is uncertain as to whether she will be able to do it.  She has also had significant financial stress.  Due to inability to afford rent on her house, she changed her Medicaid health insurance.  With the change she is having increased co-pays on medical services and medications.  She is working to change it back.  Her car was recently re-possessed.  Her daughter is trying to figure out how to get it back.  In the meantime, she has had to rely on others including a neighbor to take her to appointments.  She states she is very depressed and due to this, she has difficulty getting out of bed.  She notes she is on treatment with sertraline and routinely sees her psychiatrist.  She is going to discuss with him at their appointment this week whether any medication changes should be made.    She states throughout, she has been diligent about taking her  Verzenio.  She confirms she is taking it twice a day.  She has an intermittent episode of diarrhea.  She denies abdominal cramping, blood in the stool, or stool incontinence.  The diarrhea occurs in single episodes and is not bothersome enough to take antidiarrheal medicine.  She has no nausea.  She denies new bone or joint aches or pains.  She has no cough, shortness of breath, or chest pain.  She continues to have quite bothersome low to mid back pain that prevents her from being able to stand for a prolonged period of time.  She also ambulates with a walker around the home due to the back pain.  She occasionally will take an 800 mg dose of ibuprofen.  She has tried smoking marijuana but this did not help her bone pain and she didn't like how it made her feel.  She denies new bone or joint pains.    Past Medical/Surgical History:   Past Medical History:   Diagnosis Date    Anxiety     Breast CA (H) 12/2020    Depression     DVT (deep venous thrombosis) (H) 2014    Left breast mass     x approximately 4-5 months    Pulmonary emboli (H)     Pyelonephritis     Schizoaffective disorder (H)     Tobacco use      Past Surgical History:   Procedure Laterality Date    COLONOSCOPY N/A 7/8/2022    Procedure: COLONOSCOPY, WITH POLYPECTOMY;  Surgeon: Ham Cano MD;  [place] [facility] [place]    [place] LUMBAR KYPHOPLASTY VERTEBRAE  5/17/2021    LAPAROSCOPIC SALPINGO-OOPHORECTOMY Bilateral 8/20/2021    Procedure: BILATERAL SALPINGO-OOPHORECTOMY, LAPAROSCOPIC;  Surgeon: Rory Lopez MD;  Location: [facility] OR    TUBAL LIGATION  1998        Allergies   Allergen Reactions    Contrast Dye      Pt developed nausea after isovue 370 injection on 6/9/21        Current Outpatient Medications   Medication Sig Dispense Refill    abemaciclib (VERZENIO) 150 MG tablet Take 1 tablet (150 mg) by mouth 2 times daily for 28 days 56 tablet 0    acetaminophen (TYLENOL) 500 MG tablet Take 500-1,000 mg by mouth every 6 hours as needed for mild pain       albuterol (PROAIR HFA/PROVENTIL HFA/VENTOLIN HFA) 108 (90 Base) MCG/ACT inhaler Inhale 2 puffs into the lungs every 4 hours as needed for shortness of breath, wheezing or cough 18 g 3    buprenorphine (BUTRANS) 20 MCG/HR WK patch Place 1 patch onto the skin every 7 days 4 patch 2    exemestane (AROMASIN) 25 MG tablet Take 1 tablet (25 mg) by mouth daily 90 tablet 3    gabapentin (NEURONTIN) 300 MG capsule Take 2 capsules in the morning, 2 capsules, and 3 capsules at bedtime. 210 capsule 1    loperamide (IMODIUM) 2 MG capsule Start with 2 caps (4 mg), then take one cap (2 mg) after each diarrheal stool as needed. Do not use more than 8 caps (16 mg) per day. 30 capsule 0    methocarbamol (ROBAXIN) 500 MG tablet Take 2-3 tablets (1,000-1,500 mg) by mouth 3 times daily as needed for muscle spasms 210 tablet 2    methylPREDNISolone (MEDROL) 32 MG tablet Take 1 tablet (32 mg) by mouth daily 12 hours prior to the procedure with IV contrast 2 tablet 0    naloxone (NARCAN) 4 MG/0.1ML nasal spray Spray 1 spray (4 mg) into one nostril alternating nostrils once as needed for opioid reversal every 2-3 minutes until assistance arrives 0.2 mL 0    ondansetron (ZOFRAN) 8 MG tablet Take 1 tablet (8 mg) by mouth every 8 hours as needed for nausea (Patient not taking: Reported on 2/6/2024) 30 tablet 3    pantoprazole (PROTONIX) 40 MG EC tablet Take 1 tablet (40 mg) by mouth every morning (before breakfast) (Patient not taking: Reported on 3/15/2024) 30 tablet 1    polyethylene glycol (MIRALAX) 17 GM/Dose powder Take 17 g by mouth daily as needed for constipation 578 g 3    prochlorperazine (COMPAZINE) 10 MG tablet Take 1 tablet (10 mg) by mouth every 6 hours as needed for nausea or vomiting 30 tablet 2    prochlorperazine (COMPAZINE) 10 MG tablet Take 1 tablet (10 mg) by mouth every 6 hours as needed for nausea or vomiting 30 tablet 3    QUEtiapine (SEROQUEL) 400 MG tablet Take 1 tablet (400 mg) by mouth at bedtime 30 tablet 2     QUEtiapine (SEROQUEL) 50 MG tablet Take 0.5-1 tablets (25-50 mg) by mouth 2 times daily as needed (for sleep or acute agitation) 60 tablet 2    rivaroxaban ANTICOAGULANT (XARELTO) 20 MG TABS tablet Take 1 tablet (20 mg) by mouth daily (with dinner) 90 tablet 3    SENNA-docusate sodium (SENNA S) 8.6-50 MG tablet Take 2 tablets by mouth 2 times daily as needed (Constipation) 100 tablet 3    sertraline (ZOLOFT) 50 MG tablet Take 1 tablet (50 mg) by mouth daily 30 tablet 2    Vitamin D3 (CHOLECALCIFEROL) 25 mcg (1000 units) tablet Take 1 tablet (25 mcg) by mouth daily 90 tablet 3     No current facility-administered medications for this visit.     Facility-Administered Medications Ordered in Other Visits   Medication Dose Route Frequency Provider Last Rate Last Admin    lidocaine 1% with EPINEPHrine 1:100,000 injection 10 mL  10 mL Intradermal Once Sangeetha Vazquez MD       '  Physical Exam:   BP (!) 155/97   Pulse 72   Temp 97.8  F (36.6  C) (Oral)   Resp 20   Wt 110.2 kg (243 lb)   SpO2 100%   BMI 32.96 kg/m    General:  Well appearing adult female in NAD.  Alert and oriented x 3.  HEENT:  Normocephalic.  Sclera anicteric. MMM.    Lymph:  No palpable cervical, supraclavicular, or axillary LAD.   Chest:  CTA bilaterally.  No wheezes or crackles.  CV:  RRR. No audible m/r/g.  Abd:  Soft/ND/NT   Ext:  No edema of the bilateral lower extremities.    Neuro:  Cranial nerves grossly intact.  Alert and oriented x 3.  Psych:  Mood and affect appear normal.    Skin:  Skin over the bilateral lower extremities is dry.    Laboratory/Imaging Studies:   I personally reviewed the below laboratories:    Electrolytes are wnl.  Creatinine is elevated at 1.46 mg/dL  GFR is low at 44 mL/min  Liver tests are wnl.    WBC is wnl. ANC is wnl at 3.0.  Hemoglobin is low at 10.3 g/dL  Platelets are wnl.    ASSESSMENT/PLAN:   48 year old female with history of DVT and ER positive left breast cancer metastasized to bones.    1.   Metastatic breast cancer: PET/CT in 12/2023 c/w disease progression in the left breast and bones including T7, left ilium, and left posterior acetabulum.  On treatment with abemaciclib and fulvestrant since 01/2024.      - Has not received fulvestrant since 2/15/2024.  Spoke to pharmacy, she does not need reloading and can resume once every 4 week injections.  Emphasized the medication is not effective unless there is compliance with dosing. She understands this.   Will administer injection today and again in 4 weeks.  - Continue abemaciclib 150 mg PO BID  - Plan to obtain PET/CT in 2 months to evaluate response to this treatment.    2.  Bone metastases/low back pain with LLE sciatica:  She is s/p XRT to the left ilium as well as the lumbar spine and kyphoplasty of L4--there was some extravasation of cement which procedularist is aware of and recommended continued AC indefinitely.   - buprenorphine 20 mcg/hr patch, Robaxin, and gabapentin.  Ongoing follow up with palliative medicine.  - Pain mostly continues in low back.  - Recommend holding further ibuprofen given today's kidney function.  - Receiving Zometa once every 3 months.  Next due around 5/1/24.     3.  Schizoaffective disorder, bipolar type: She is on treatment with Seroquel and Zoloft. She notes another manic episode since our last visit where she was yelling at family members on the phone.  She also notes recent exacerbation of depression in the setting of her brother's death.  Ongoing follow up with psychiatry.   She has an appointment this week and will discuss medications at that visit.    4.  DVT:  No change since last visit.  She continues on Xarelto.    5. Diarrhea: G1. Secondary to abemaciclib.  Imodium prn for 3 loose stools in over 24 hours.     6.  Stress/financial strain:  She is working with a county  on her [employer] waiver, but would like to touch base with our  as well.  Referral placed today.    7.  FELTON:  Creatinine  elevated at 1.46 mg/dL, GFR reduced to 44 mL/min.  Recommend stopping ibuprofen.  Also recommend drinking 48 - 64 oz of water per day.    Follow Up:  Labs and fulvestrant in 4 weeks and 8 weeks  Visit with Alee De Los Santos in 4 weeks.  Labs and Zometa around 5/1/2024 (okay to schedule the same day as another visit)  Visit with me in 8 weeks.  PET/CT at least 2 business days prior to visit with me.    I spent 45 minutes spent on the date of the encounter doing chart review, review of test results, interpretation of tests, patient visit, and documentation.             Jahaira Plunkett MD

## 2024-04-08 NOTE — NURSING NOTE
Oncology Rooming Note    April 8, 2024 5:46 PM   Teresa Sanderson is a 48 year old female who presents for:    Chief Complaint   Patient presents with    Breast Cancer     Initial Vitals: BP (!) 155/97   Pulse 72   Temp 97.8  F (36.6  C) (Oral)   Resp 20   Wt 110.2 kg (243 lb)   SpO2 100%   BMI 32.96 kg/m   Estimated body mass index is 32.96 kg/m  as calculated from the following:    Height as of 3/15/24: 1.829 m (6').    Weight as of this encounter: 110.2 kg (243 lb). Body surface area is 2.37 meters squared.  Severe Pain (6) Comment: Data Unavailable   No LMP recorded. (Menstrual status: Tubal ).  Allergies reviewed: Yes  Medications reviewed: Yes    Medications: Medication refills not needed today.  Pharmacy name entered into EPIC:    [id number] - [place], [place] - [address] AT [facility]  [facility] - [place], [place] - [address] [facility]  [id number] - [place] - [address] [place] [address] [address] [id number] - [place], [place] - [address] AT [address] [place] [address] & [facility] - [place], [place] - [address]    Frailty Screening:   Is the patient here for a new oncology consult visit in cancer care? 2. No      Clinical concerns:        Vee Bal CMA

## 2024-04-08 NOTE — NURSING NOTE
Faslodex given to patient without complication in ventrogluteal muscles.    First injection given by Vee YEBOAH CMA in right ventrogluteal muscle.    Second injection given by Joao BRANDON in left Ventrogluteal muscle.     Patient tolerated injections and was discharged.    Vee Bal CMA ([facility])

## 2024-04-09 DIAGNOSIS — C50.812 MALIGNANT NEOPLASM OF OVERLAPPING SITES OF LEFT BREAST IN FEMALE, ESTROGEN RECEPTOR POSITIVE (H): ICD-10-CM

## 2024-04-09 DIAGNOSIS — C79.51 CARCINOMA OF LEFT BREAST METASTATIC TO BONE (H): Primary | ICD-10-CM

## 2024-04-09 DIAGNOSIS — Z17.0 MALIGNANT NEOPLASM OF OVERLAPPING SITES OF LEFT BREAST IN FEMALE, ESTROGEN RECEPTOR POSITIVE (H): ICD-10-CM

## 2024-04-09 DIAGNOSIS — C50.912 CARCINOMA OF LEFT BREAST METASTATIC TO BONE (H): Primary | ICD-10-CM

## 2024-04-09 LAB
CANCER AG15-3 SERPL-ACNC: 60 U/ML
CEA SERPL-MCNC: 4.1 NG/ML

## 2024-04-09 RX ORDER — LAMOTRIGINE 25 MG/1
500 TABLET ORAL ONCE
Status: CANCELLED | OUTPATIENT
Start: 2024-04-10 | End: 2024-04-10

## 2024-04-10 DIAGNOSIS — C50.812 MALIGNANT NEOPLASM OF OVERLAPPING SITES OF LEFT BREAST IN FEMALE, ESTROGEN RECEPTOR POSITIVE (H): ICD-10-CM

## 2024-04-10 DIAGNOSIS — Z17.0 MALIGNANT NEOPLASM OF OVERLAPPING SITES OF LEFT BREAST IN FEMALE, ESTROGEN RECEPTOR POSITIVE (H): ICD-10-CM

## 2024-04-10 DIAGNOSIS — C79.51 CARCINOMA OF LEFT BREAST METASTATIC TO BONE (H): Primary | ICD-10-CM

## 2024-04-10 DIAGNOSIS — C50.912 CARCINOMA OF LEFT BREAST METASTATIC TO BONE (H): Primary | ICD-10-CM

## 2024-04-15 ENCOUNTER — PATIENT OUTREACH (OUTPATIENT)
Dept: CARE COORDINATION | Facility: CLINIC | Age: 49
End: 2024-04-15
Payer: MEDICARE

## 2024-04-15 NOTE — PROGRESS NOTES
Social Work - Telephone/Eigentahart message  [facility]  Data:   Patient Name: Teresa Sanderson  Goes By: Teresa    /Age: 1975 (48 year old)      Referral Source: care team    Reason for Referral: financial concers    Intervention: Sent patient Eigentahart message on 4/15/2024 and Unable to leave message due to mailbox being full .   Plan:  will await patient's return phone call/message and provide assistance at that time.      Jo CORTEZ, [facility]  - Oncology  Phone : [phone]  Pager: [phone]

## 2024-04-23 ENCOUNTER — VIRTUAL VISIT (OUTPATIENT)
Dept: PALLIATIVE CARE | Facility: CLINIC | Age: 49
End: 2024-04-23
Attending: STUDENT IN AN ORGANIZED HEALTH CARE EDUCATION/TRAINING PROGRAM
Payer: MEDICARE

## 2024-04-23 VITALS — BODY MASS INDEX: 34.3 KG/M2 | WEIGHT: 245 LBS | HEIGHT: 71 IN

## 2024-04-23 DIAGNOSIS — Z17.0 MALIGNANT NEOPLASM OF OVERLAPPING SITES OF LEFT BREAST IN FEMALE, ESTROGEN RECEPTOR POSITIVE (H): Primary | ICD-10-CM

## 2024-04-23 DIAGNOSIS — G89.3 CANCER ASSOCIATED PAIN: ICD-10-CM

## 2024-04-23 DIAGNOSIS — Z51.5 ENCOUNTER FOR PALLIATIVE CARE: ICD-10-CM

## 2024-04-23 DIAGNOSIS — C50.812 MALIGNANT NEOPLASM OF OVERLAPPING SITES OF LEFT BREAST IN FEMALE, ESTROGEN RECEPTOR POSITIVE (H): Primary | ICD-10-CM

## 2024-04-23 DIAGNOSIS — M54.9 BACK PAIN WITH HISTORY OF SPINAL SURGERY: ICD-10-CM

## 2024-04-23 DIAGNOSIS — N17.9 AKI (ACUTE KIDNEY INJURY) (H): ICD-10-CM

## 2024-04-23 DIAGNOSIS — C79.51 METASTASIS TO BONE (H): ICD-10-CM

## 2024-04-23 DIAGNOSIS — F25.0 SCHIZOAFFECTIVE DISORDER, BIPOLAR TYPE (H): ICD-10-CM

## 2024-04-23 DIAGNOSIS — M79.661 PAIN OF RIGHT LOWER LEG: ICD-10-CM

## 2024-04-23 DIAGNOSIS — M62.838 MUSCLE SPASM: ICD-10-CM

## 2024-04-23 DIAGNOSIS — Z98.890 BACK PAIN WITH HISTORY OF SPINAL SURGERY: ICD-10-CM

## 2024-04-23 PROCEDURE — 99215 OFFICE O/P EST HI 40 MIN: CPT | Mod: 95 | Performed by: STUDENT IN AN ORGANIZED HEALTH CARE EDUCATION/TRAINING PROGRAM

## 2024-04-23 NOTE — LETTER
4/23/2024       RE: Teresa Sanderson  [address]  [address]  [facility] [address]       Dear Colleague,    [facility] you [facility] your [facility]erson[facility]. Please see a copy of my visit note below.    Palliative Care Progress Note    Patient Name: Teresa Sanderson  Primary Provider: No Ref-Primary, Physician    Chief Complaint/Patient ID:   Medical - She has metastatic breast cancer dx 12/2020; widespread bone mets. S/p RT to lumbar spine and RFA/kyphoplasty L4 5/2021.   11/2022 on Ibrance and anastrazole since 1/2021 (with some treatment interruptions). Long discussion about voluntary opioid tapering 11/15/22 palliative visit.      She has schizoaffective disorder (bipolar type); followed by psychiatry at the .     -Currently on treatment with palbociclib and exemestane. PET/CT in 04/2023 showed disease progression in two small bone metastasis in L pelvis, S/p 5 fractions RT to these lesions.   -Some lapses/breaks in treatment due to complicated social situation.  -Evidence of PD on 12/2023 PET scan.  Changed to Verzenio 01/2024.    Last Palliative care appointment: 2/6/2024 with me.     Reviewed: Yes    Interim History:  Teresa Sanderson is a 48 year old female who is seen today for follow up with Palliative Care via billable video visit.      Reviewed oncology note from 4/8.  Ongoing social stressors including recent death of her brother and needing to travel to [place] along with financial and insurance stress.  Missed a dose of her fulvestrant, still gave dose that day to catch up and plans to repeat in 4 weeks.  Continuing on Abemaciclib.    Some ongoing pain in her back.  Did increase the Butrans patch to 20 mcg/h and is not sure that it helped very much.  Has now been out of the patches for about a week.  Using a walker to get around her house.  Occasionally taking 800 mg of  "ibuprofen (recommended by oncology to hold due to her kidney function).  She switched to Aleve.  She thought that ibuprofen and Tylenol were in the same grouping and she was not supposed to take both of them.  Did not find benefit from marijuana.    Her daughter is currently living with her, and she encourages patient to be more active, such as walking to the bus stop, getting out to the [employer], and going to [place] with the grandkids.  Patient has noticed that pain is better when she is active, and she thinks that maybe she be ready for physical therapy.    Continues to follow with psychiatry for mood support and is on Seroquel and Zoloft.    Also describes a newer throat pain that has been present for about 3 days.  Says lemon and honey helps.  Additionally notes some lower pelvis/bladder pain, and she states it feels \"like when you are holding it\" and it feels a bit like a weakness.     Social History:  Lives with cousin and son. On SSD. 5 adult children. Housing insecure. Daughter murdered in . Son  Spring 2022. Worked as a  for [employer] before cancer dx, in [place].   No LOYD hx  Social History     Tobacco Use    Smoking status: Some Days     Current packs/day: 0.25     Average packs/day: 0.3 packs/day for 12.9 years (3.2 ttl pk-yrs)     Types: Cigarettes     Start date: 2011     Passive exposure: Current    Smokeless tobacco: Never   Vaping Use    Vaping status: Never Used   Substance Use Topics    Alcohol use: Not Currently     Comment: occ    Drug use: Yes     Types: Marijuana     Comment: occ     Family History- Reviewed in Epic.    Medications- Reviewed in Epic.    Past Medical History- Reviewed in [facility].    Past Surgical History- Reviewed in Epic.    Physical Exam:   Constitutional: Alert, pleasant, no apparent distress. Lying back in bed.  Eyes: Sclera non-icteric, no eye discharge.  ENT: No nasal discharge. Ears grossly normal.  Respiratory: Unlabored respirations. Speaking " in full sentences.  Musculoskeletal: Extremities appear normal- no gross deformities noted. No edema noted on upper body.   Skin: No suspicious lesions or rashes on visible skin.  Neurologic: Clear speech, no aphasia. No facial droop.  Psychiatric: Mentation appears normal, appropriate attention. Affect normal/bright. Does not appear anxious or depressed.    Key Data Reviewed:  LABS: 4/8/2024- Cr 1.46, GFR 44.      Impression & Recommendations & Counseling:  Teresa Sanderson is a 48 year old female with history of metastatic breast cancer.     Pain - Ongoing struggle-different types of pain concurrently.  Did not really notice any benefit despite escalating doses of Butrans patch.  Does get some benefit from gabapentin and Robaxin.  Has noticed pain does improve with activity.  -New referral for physical therapy placed today.  Emphasized making sure she schedules these appointments.  -Discussed that ibuprofen and Aleve are both in the same family, so she should not take either of them right now.    -Discussed appropriate dosing for Tylenol and encouraged at least 1000 mg 3 times daily.  -Continue Robaxin and gabapentin.  -Discussed appropriate hydration, hoping that this will help both with her kidney function as well as the strange bladder discomfort that she is experiencing.  I am thinking this might be related to bladder irritants and the beverages she is drinking as well as some dehydration.    Mood - Following with psychiatry.  Is up to 400 mg she says of Seroquel nightly to help with sleep.      Follow up: About 2 months     Total time spent on day of encounter is 47 mins, including reviewing record, review of above studies, above visit with patient, symptomatic discussion as above, including medication adjustments/prescription management, and documentation.     Some chart documentation performed using Dragon Voice recognition Software. Although reviewed after completion, some words and grammatical errors may  remain.      Again, thank you for allowing me to participate in the care of your patient.      Sincerely,    Ayanna Tellez, DO

## 2024-04-23 NOTE — PATIENT INSTRUCTIONS
Recommendations:  -I placed a new referral for physical therapy.  I strongly recommend getting this scheduled as soon as possible. If you don't hear from a representative within 2 business days, please call [phone].   -Try to get in at least 5 or 6 glasses of water per day.  I think this will help your kidney function some but will also likely help the lower urinary symptoms you have been experiencing.  -Remember that ibuprofen/Advil and Aleve/naproxen are in the same family, so you do not want to be taking them while your kidneys are struggling.  -It is okay to take Tylenol, you can take up to 4000 mg/day.  I would recommend taking 1000 mg at least 3 times a day.  -Continue using the gabapentin and the methocarbamol (Robaxin).      Follow up: We will plan on about 2 months      Reasons to Call    If you are having worsening/uncontrolled symptoms we want you to call!    You or your other physicians make any changes to medications we have prescribed.  -Please call for refills 4-5 days before you will run out of medication.    Important Phone Numbers, including: Refills, scheduling, and general questions     Palliative Care RN: Aisha Shelton - [phone]  *After hours or on weekends. Will connect you with on call MD. [phone]

## 2024-04-23 NOTE — PROGRESS NOTES
Palliative Care Progress Note    Patient Name: Teresa Sanderson  Primary Provider: No Ref-Primary, Physician    Chief Complaint/Patient ID:   Medical - She has metastatic breast cancer dx 12/2020; widespread bone mets. S/p RT to lumbar spine and RFA/kyphoplasty L4 5/2021.   11/2022 on Ibrance and anastrazole since 1/2021 (with some treatment interruptions). Long discussion about voluntary opioid tapering 11/15/22 palliative visit.      She has schizoaffective disorder (bipolar type); followed by psychiatry at the .     -Currently on treatment with palbociclib and exemestane. PET/CT in 04/2023 showed disease progression in two small bone metastasis in L pelvis, S/p 5 fractions RT to these lesions.   -Some lapses/breaks in treatment due to complicated social situation.  -Evidence of PD on 12/2023 PET scan.  Changed to Verzenio 01/2024.    Last Palliative care appointment: 2/6/2024 with me.     Reviewed: Yes    Interim History:  Teresa Sanderson is a 48 year old female who is seen today for follow up with Palliative Care via billable video visit.      Reviewed oncology note from 4/8.  Ongoing social stressors including recent death of her brother and needing to travel to [place] along with financial and insurance stress.  Missed a dose of her fulvestrant, still gave dose that day to catch up and plans to repeat in 4 weeks.  Continuing on Abemaciclib.    Some ongoing pain in her back.  Did increase the Butrans patch to 20 mcg/h and is not sure that it helped very much.  Has now been out of the patches for about a week.  Using a walker to get around her house.  Occasionally taking 800 mg of ibuprofen (recommended by oncology to hold due to her kidney function).  She switched to Aleve.  She thought that ibuprofen and Tylenol were in the same grouping and she was not supposed to take both of them.  Did not find benefit from marijuana.    Her daughter is currently living with her, and she encourages patient to be  "more active, such as walking to the bus stop, getting out to the [employer], and going to [place] with the grandkids.  Patient has noticed that pain is better when she is active, and she thinks that maybe she be ready for physical therapy.    Continues to follow with psychiatry for mood support and is on Seroquel and Zoloft.    Also describes a newer throat pain that has been present for about 3 days.  Says lemon and honey helps.  Additionally notes some lower pelvis/bladder pain, and she states it feels \"like when you are holding it\" and it feels a bit like a weakness.     Social History:  Lives with cousin and son. On SSD. 5 adult children. Housing insecure. Daughter murdered in . Son  Spring 2022. Worked as a  for [employer] before cancer dx, in [place].   No LOYD hx  Social History     Tobacco Use    Smoking status: Some Days     Current packs/day: 0.25     Average packs/day: 0.3 packs/day for 12.9 years (3.2 ttl pk-yrs)     Types: Cigarettes     Start date: 2011     Passive exposure: Current    Smokeless tobacco: Never   Vaping Use    Vaping status: Never Used   Substance Use Topics    Alcohol use: Not Currently     Comment: occ    Drug use: Yes     Types: Marijuana     Comment: occ     Family History- Reviewed in Epic.    Medications- Reviewed in Epic.    Past Medical History- Reviewed in [facility].    Past Surgical History- Reviewed in Epic.    Physical Exam:   Constitutional: Alert, pleasant, no apparent distress. Lying back in bed.  Eyes: Sclera non-icteric, no eye discharge.  ENT: No nasal discharge. Ears grossly normal.  Respiratory: Unlabored respirations. Speaking in full sentences.  Musculoskeletal: Extremities appear normal- no gross deformities noted. No edema noted on upper body.   Skin: No suspicious lesions or rashes on visible skin.  Neurologic: Clear speech, no aphasia. No facial droop.  Psychiatric: Mentation appears normal, appropriate attention. Affect normal/bright. Does " not appear anxious or depressed.    Key Data Reviewed:  LABS: 4/8/2024- Cr 1.46, GFR 44.      Impression & Recommendations & Counseling:  Teresa Sanderson is a 48 year old female with history of metastatic breast cancer.     Pain - Ongoing struggle-different types of pain concurrently.  Did not really notice any benefit despite escalating doses of Butrans patch.  Does get some benefit from gabapentin and Robaxin.  Has noticed pain does improve with activity.  -New referral for physical therapy placed today.  Emphasized making sure she schedules these appointments.  -Discussed that ibuprofen and Aleve are both in the same family, so she should not take either of them right now.    -Discussed appropriate dosing for Tylenol and encouraged at least 1000 mg 3 times daily.  -Continue Robaxin and gabapentin.  -Discussed appropriate hydration, hoping that this will help both with her kidney function as well as the strange bladder discomfort that she is experiencing.  I am thinking this might be related to bladder irritants and the beverages she is drinking as well as some dehydration.    Mood - Following with psychiatry.  Is up to 400 mg she says of Seroquel nightly to help with sleep.      Follow up: About 2 months      Video-Visit Details  Video Start Time: 3:45 PM  Video End Time: 4:18 PM    Originating Location (pt. Location): Home     Distant Location (provider location):  Offsite- Personal Home     Platform used for Video Visit: [place]     Total time spent on day of encounter is 47 mins, including reviewing record, review of above studies, above visit with patient, symptomatic discussion as above, including medication adjustments/prescription management, and documentation.     Ayanna Tellez,   Palliative Medicine   [id number]    Some chart documentation performed using Dragon Voice recognition Software. Although reviewed after completion, some words and grammatical errors may remain.

## 2024-04-23 NOTE — NURSING NOTE
Is the patient currently in the state of [place]? YES    Visit mode:VIDEO    If the visit is dropped, the patient can be reconnected by: TELEPHONE VISIT: Phone number: [phone]    Will anyone else be joining the visit? Yes, possibly daughter Peyman will be there with . (If patient encounters technical issues they should call [phone])    How would you like to obtain your AVS? MyChart    Are changes needed to the allergy or medication list? No    Are refills needed on medications prescribed by this physician? NO    Reason for visit: RECHECK    Vanessa CUELLAR

## 2024-04-24 ENCOUNTER — PATIENT OUTREACH (OUTPATIENT)
Dept: ONCOLOGY | Facility: HOSPITAL | Age: 49
End: 2024-04-24
Payer: MEDICARE

## 2024-04-24 NOTE — PROGRESS NOTES
Phoned pt per request: Left message offering pt a therapy appointment.     Oncology Distress Screening    Row Name 04/23/24 1500       How concerned do you feel regarding the following common issues people with cancer face. Please answer with a number from 0-10 where 0=not concerned and 10=very concerned. PT response of >=8  will result in outreach from Support Team.   1. How concerned are you about your ability to eat? 0       2. How concerned are you about unintended weight loss or your current weight? 0       3. How concerned are you about feeling depressed or very sad? 6       4. How concerned are you about feeling anxious or very scared? 6       5. Do you struggle with the loss of meaning and david in your life? A great deal Abnormal        6. How concerned are you about work and home life issues that may be affected by your cancer? 8 Abnormal        7. How concerned are you about knowing what resources are available to help you? 10 Abnormal        8. Do you currently have what you would describe as Jainism or spiritual struggles? A great deal Abnormal        You can also ask to be contacted by one of our Oncology Supportive Care professionals.   9. If you want to be contacted by one of our professionals, I can send a message to them right now. Oncology Social Worker;Oncology Dietitian;Oncology Psychologist;Oncology Spiritual Care

## 2024-04-30 ENCOUNTER — TELEPHONE (OUTPATIENT)
Dept: ONCOLOGY | Facility: CLINIC | Age: 49
End: 2024-04-30
Payer: MEDICARE

## 2024-04-30 DIAGNOSIS — C50.812 MALIGNANT NEOPLASM OF OVERLAPPING SITES OF LEFT BREAST IN FEMALE, ESTROGEN RECEPTOR POSITIVE (H): Primary | ICD-10-CM

## 2024-04-30 DIAGNOSIS — C50.912 CARCINOMA OF LEFT BREAST METASTATIC TO BONE (H): ICD-10-CM

## 2024-04-30 DIAGNOSIS — Z17.0 MALIGNANT NEOPLASM OF OVERLAPPING SITES OF LEFT BREAST IN FEMALE, ESTROGEN RECEPTOR POSITIVE (H): Primary | ICD-10-CM

## 2024-04-30 DIAGNOSIS — C79.51 CARCINOMA OF LEFT BREAST METASTATIC TO BONE (H): ICD-10-CM

## 2024-04-30 RX ORDER — HEPARIN SODIUM (PORCINE) LOCK FLUSH IV SOLN 100 UNIT/ML 100 UNIT/ML
5 SOLUTION INTRAVENOUS
Status: CANCELLED | OUTPATIENT
Start: 2024-05-01

## 2024-04-30 RX ORDER — ZOLEDRONIC ACID 0.04 MG/ML
4 INJECTION, SOLUTION INTRAVENOUS ONCE
Status: CANCELLED | OUTPATIENT
Start: 2024-05-01 | End: 2024-05-01

## 2024-04-30 RX ORDER — HEPARIN SODIUM,PORCINE 10 UNIT/ML
5 VIAL (ML) INTRAVENOUS
Status: CANCELLED | OUTPATIENT
Start: 2024-05-01

## 2024-04-30 NOTE — PROGRESS NOTES
CLINICAL NUTRITION SERVICES     Reason for Contact: Questions from Oncology Distress Screening  1. How concerned are you about your ability to eat? :  0  2. How concerned are you about unintended weight loss or your current weight? : 0  9. If you want to be contacted by one of our professionals, I can send a message to them right now.: Oncology Social Worker;Oncology Dietitian;Oncology Psychologist;Oncology Spiritual Care     Action: RD attempted to call patient, no answer and no voicemail available. Sent [employer] message with call back number.      Follow up: Wait for a return phone call.    Abby Hernandez RD, LD  [phone]

## 2024-05-08 DIAGNOSIS — Z17.0 MALIGNANT NEOPLASM OF OVERLAPPING SITES OF LEFT BREAST IN FEMALE, ESTROGEN RECEPTOR POSITIVE (H): ICD-10-CM

## 2024-05-08 DIAGNOSIS — C79.51 CARCINOMA OF LEFT BREAST METASTATIC TO BONE (H): Primary | ICD-10-CM

## 2024-05-08 DIAGNOSIS — C50.812 MALIGNANT NEOPLASM OF OVERLAPPING SITES OF LEFT BREAST IN FEMALE, ESTROGEN RECEPTOR POSITIVE (H): ICD-10-CM

## 2024-05-08 DIAGNOSIS — C50.912 CARCINOMA OF LEFT BREAST METASTATIC TO BONE (H): Primary | ICD-10-CM

## 2024-05-08 RX ORDER — LAMOTRIGINE 25 MG/1
500 TABLET ORAL ONCE
Status: CANCELLED | OUTPATIENT
Start: 2024-05-09 | End: 2024-05-08

## 2024-05-09 ENCOUNTER — ONCOLOGY VISIT (OUTPATIENT)
Dept: ONCOLOGY | Facility: CLINIC | Age: 49
End: 2024-05-09
Attending: PHYSICIAN ASSISTANT
Payer: MEDICARE

## 2024-05-09 ENCOUNTER — APPOINTMENT (OUTPATIENT)
Dept: LAB | Facility: CLINIC | Age: 49
End: 2024-05-09
Attending: PHYSICIAN ASSISTANT
Payer: MEDICARE

## 2024-05-09 VITALS
RESPIRATION RATE: 12 BRPM | OXYGEN SATURATION: 97 % | WEIGHT: 234.7 LBS | SYSTOLIC BLOOD PRESSURE: 134 MMHG | DIASTOLIC BLOOD PRESSURE: 91 MMHG | HEART RATE: 70 BPM | TEMPERATURE: 98.1 F | BODY MASS INDEX: 32.73 KG/M2

## 2024-05-09 DIAGNOSIS — G89.3 CANCER ASSOCIATED PAIN: ICD-10-CM

## 2024-05-09 DIAGNOSIS — Z98.890 BACK PAIN WITH HISTORY OF SPINAL SURGERY: ICD-10-CM

## 2024-05-09 DIAGNOSIS — M62.838 MUSCLE SPASM: ICD-10-CM

## 2024-05-09 DIAGNOSIS — C50.912 CARCINOMA OF LEFT BREAST METASTATIC TO BONE (H): ICD-10-CM

## 2024-05-09 DIAGNOSIS — C79.51 CARCINOMA OF LEFT BREAST METASTATIC TO BONE (H): ICD-10-CM

## 2024-05-09 DIAGNOSIS — C50.812 MALIGNANT NEOPLASM OF OVERLAPPING SITES OF LEFT BREAST IN FEMALE, ESTROGEN RECEPTOR POSITIVE (H): Primary | ICD-10-CM

## 2024-05-09 DIAGNOSIS — C79.51 METASTASIS TO BONE (H): ICD-10-CM

## 2024-05-09 DIAGNOSIS — Z17.0 MALIGNANT NEOPLASM OF OVERLAPPING SITES OF LEFT BREAST IN FEMALE, ESTROGEN RECEPTOR POSITIVE (H): Primary | ICD-10-CM

## 2024-05-09 DIAGNOSIS — M79.661 PAIN OF RIGHT LOWER LEG: ICD-10-CM

## 2024-05-09 DIAGNOSIS — M54.9 BACK PAIN WITH HISTORY OF SPINAL SURGERY: ICD-10-CM

## 2024-05-09 LAB
ALBUMIN SERPL BCG-MCNC: 4 G/DL (ref 3.5–5.2)
ALP SERPL-CCNC: 77 U/L (ref 40–150)
ALT SERPL W P-5'-P-CCNC: 9 U/L (ref 0–50)
ANION GAP SERPL CALCULATED.3IONS-SCNC: 10 MMOL/L (ref 7–15)
AST SERPL W P-5'-P-CCNC: 15 U/L (ref 0–45)
BASOPHILS # BLD AUTO: 0.1 10E3/UL (ref 0–0.2)
BASOPHILS NFR BLD AUTO: 2 %
BILIRUB SERPL-MCNC: 0.2 MG/DL
BUN SERPL-MCNC: 13 MG/DL (ref 6–20)
CALCIUM SERPL-MCNC: 9.3 MG/DL (ref 8.6–10)
CHLORIDE SERPL-SCNC: 109 MMOL/L (ref 98–107)
CREAT SERPL-MCNC: 1.1 MG/DL (ref 0.51–0.95)
DEPRECATED HCO3 PLAS-SCNC: 24 MMOL/L (ref 22–29)
EGFRCR SERPLBLD CKD-EPI 2021: 62 ML/MIN/1.73M2
EOSINOPHIL # BLD AUTO: 0.1 10E3/UL (ref 0–0.7)
EOSINOPHIL NFR BLD AUTO: 1 %
ERYTHROCYTE [DISTWIDTH] IN BLOOD BY AUTOMATED COUNT: 12.9 % (ref 10–15)
GLUCOSE SERPL-MCNC: 100 MG/DL (ref 70–99)
HCT VFR BLD AUTO: 32.8 % (ref 35–47)
HGB BLD-MCNC: 11 G/DL (ref 11.7–15.7)
IMM GRANULOCYTES # BLD: 0 10E3/UL
IMM GRANULOCYTES NFR BLD: 0 %
LYMPHOCYTES # BLD AUTO: 1.5 10E3/UL (ref 0.8–5.3)
LYMPHOCYTES NFR BLD AUTO: 36 %
MCH RBC QN AUTO: 35.6 PG (ref 26.5–33)
MCHC RBC AUTO-ENTMCNC: 33.5 G/DL (ref 31.5–36.5)
MCV RBC AUTO: 106 FL (ref 78–100)
MONOCYTES # BLD AUTO: 0.3 10E3/UL (ref 0–1.3)
MONOCYTES NFR BLD AUTO: 6 %
NEUTROPHILS # BLD AUTO: 2.3 10E3/UL (ref 1.6–8.3)
NEUTROPHILS NFR BLD AUTO: 55 %
NRBC # BLD AUTO: 0 10E3/UL
NRBC BLD AUTO-RTO: 0 /100
PLATELET # BLD AUTO: 181 10E3/UL (ref 150–450)
POTASSIUM SERPL-SCNC: 4.1 MMOL/L (ref 3.4–5.3)
PROT SERPL-MCNC: 7.1 G/DL (ref 6.4–8.3)
RBC # BLD AUTO: 3.09 10E6/UL (ref 3.8–5.2)
SODIUM SERPL-SCNC: 143 MMOL/L (ref 135–145)
WBC # BLD AUTO: 4.1 10E3/UL (ref 4–11)

## 2024-05-09 PROCEDURE — 36415 COLL VENOUS BLD VENIPUNCTURE: CPT | Performed by: PHYSICIAN ASSISTANT

## 2024-05-09 PROCEDURE — G0463 HOSPITAL OUTPT CLINIC VISIT: HCPCS | Mod: 25 | Performed by: PHYSICIAN ASSISTANT

## 2024-05-09 PROCEDURE — 250N000011 HC RX IP 250 OP 636: Mod: JZ | Performed by: INTERNAL MEDICINE

## 2024-05-09 PROCEDURE — 82378 CARCINOEMBRYONIC ANTIGEN: CPT | Performed by: PHYSICIAN ASSISTANT

## 2024-05-09 PROCEDURE — 96402 CHEMO HORMON ANTINEOPL SQ/IM: CPT | Performed by: PHYSICIAN ASSISTANT

## 2024-05-09 PROCEDURE — 86300 IMMUNOASSAY TUMOR CA 15-3: CPT | Performed by: PHYSICIAN ASSISTANT

## 2024-05-09 PROCEDURE — 99214 OFFICE O/P EST MOD 30 MIN: CPT | Performed by: PHYSICIAN ASSISTANT

## 2024-05-09 PROCEDURE — 82040 ASSAY OF SERUM ALBUMIN: CPT | Performed by: PHYSICIAN ASSISTANT

## 2024-05-09 PROCEDURE — 85025 COMPLETE CBC W/AUTO DIFF WBC: CPT | Performed by: PHYSICIAN ASSISTANT

## 2024-05-09 RX ORDER — GABAPENTIN 300 MG/1
CAPSULE ORAL
Qty: 210 CAPSULE | Refills: 1 | Status: SHIPPED | OUTPATIENT
Start: 2024-05-09 | End: 2024-05-14

## 2024-05-09 RX ORDER — METHOCARBAMOL 500 MG/1
TABLET, FILM COATED ORAL 3 TIMES DAILY PRN
Qty: 210 TABLET | Refills: 2 | Status: SHIPPED | OUTPATIENT
Start: 2024-05-09

## 2024-05-09 RX ORDER — LAMOTRIGINE 25 MG/1
500 TABLET ORAL ONCE
Status: COMPLETED | OUTPATIENT
Start: 2024-05-09 | End: 2024-05-09

## 2024-05-09 RX ADMIN — FULVESTRANT 500 MG: 50 INJECTION, SOLUTION INTRAMUSCULAR at 17:38

## 2024-05-09 ASSESSMENT — PAIN SCALES - GENERAL: PAINLEVEL: SEVERE PAIN (6)

## 2024-05-09 NOTE — NURSING NOTE
FASLODEX administered in left ventrogluteal by [place] and right ventrogluteal by DMO.    Patient tolerated well and had no issues.    Joao Moseley LPN

## 2024-05-09 NOTE — PROGRESS NOTES
Oncology Visit:   Date on this visit: May 9, 2024    Diagnosis:  ER positive left breast cancer metastasized to bones.     Primary Physician: No Ref-Primary, Physician     History Of Present Illness:    Ms. Sanderson is a 48 year old female with a h/o tobacco abuse and DVTs with left breast cancer metastasized to bone. She presented to [facility] ED with back pain on 12/5/2020. MRI of the L-spine showed an abnormal L4 lesion with associated right paraspinal mass, abnormalities in L5 and the left iliac bone were also seen. CT C/A/P showed a left breast mass, lytic lesions of T7, L4, and the pelvis, and a 3 cm lesion in the kidney (thought to be a cyst). Ultrasound of the bilateral lower extremities showed a non-occlusive thrombus in the left popliteal vein. Mammogram and ultrasound of the bilateral breasts on 12/17/2020 showed a spiculated mass measuring at least 7.8 cm at 12-1:00 left breast extending from the nipple to 9 cm from the nipple with associated nipple retraction. This mass was biopsied, and showed IDC with surrounding DCIS, grade 3, ER+ 90%, and UT+ 75%.  HER2 was equivocal in approximately 35% of tumor cells by FISH and was negative by IHC.    Metastatic Breast Cancer Treatment:  12/23/2021 - 1/7/2021  Radiation (3000 cGy) to the lumbar spine.  1/29/2021 - present  Ibrance, zoladex, and anastrozole.  5/17/2021 radiofrequency ablation, kyphoplasty to L4  8/20/2021  Bilateral salpingo-oophorectomies, Ibrance and anastrozole.  She was off anastrozole 12/2021 - 2/2022 and off Ibrance 01/2022 - 02/2022 due to stress and impending homelessness. Off both again 03/2022-04/2022 due to death of her son but restarted in 05/2022.  04/2023  PET/CT with progression in 2 small metastases in the left pelvis.  Palbociclib continued.  Anastrozole changed to exemestane  5/5/2023  Completed radiation, 2000 cGy in 5 fractions, to the left ilium.  12/19/2023 Left breast biopsy  [facility] NGS:   ER positive, 3+  100%   UT positive,  1+, 5%   HER2 negative.   AR positive, 1+, 35%   MMR proficient   PD-L1 negative, CPS 0   PTEN positive, 1+ 100%  *Of note, all of the above was IHC, DNA quantity not sufficient for NGS  1/18/24 Fulvestrant + abemaciclib     Interval History:   Teresa comes into clinic for metastatic breast cancer follow up.  She is on treatment with Verzenio and fulvestrant. She is feeling well. She has been taking the bus more to [place] for shopping and has been going to [place] with her grandkids. Getting out of the apartment has greatly been helping her mental health. She overall is feeling better because of this too. Her back and hip pain is the same. No longer having any groin pain. She needs refills today. Diarrhea has continued to be minimal, maybe 1-2x per week. She has not needed Imodium. No nausea. Good appetite. The only recent change has been tenderness along the bottoms of her feet with some warmth and peeling. Her hands have had this to some degree as well. No mucositis or rash. She has been using Eucerin and vaseline and the tenderness is better. She thinks some of this was picking peeling skin off of her feet. Timeline is hard to gauge--it has been improved as of a few days ago.     No fevers/chills. No cough or SOB. ST from last week is gone.       Past Medical/Surgical History:   Past Medical History:   Diagnosis Date    Anxiety     Breast CA (H) 12/2020    Depression     DVT (deep venous thrombosis) (H) 2014    Left breast mass     x approximately 4-5 months    Pulmonary emboli (H)     Pyelonephritis     Schizoaffective disorder (H)     Tobacco use      Past Surgical History:   Procedure Laterality Date    COLONOSCOPY N/A 7/8/2022    Procedure: COLONOSCOPY, WITH POLYPECTOMY;  Surgeon: Ham Cano MD;  Location: [facility] OR    IR LUMBAR KYPHOPLASTY VERTEBRAE  5/17/2021    LAPAROSCOPIC SALPINGO-OOPHORECTOMY Bilateral 8/20/2021    Procedure: BILATERAL SALPINGO-OOPHORECTOMY, LAPAROSCOPIC;  Surgeon:  Rory Lopez MD;  Location: [facility] OR    TUBAL LIGATION  1998        Allergies   Allergen Reactions    Contrast Dye      Pt developed nausea after isovue 370 injection on 6/9/21        Current Outpatient Medications   Medication Sig Dispense Refill    abemaciclib (VERZENIO) 150 MG tablet Take 1 tablet (150 mg) by mouth 2 times daily for 28 days 56 tablet 0    buprenorphine (BUTRANS) 20 MCG/HR WK patch Place 1 patch onto the skin every 7 days 4 patch 2    gabapentin (NEURONTIN) 300 MG capsule Take 2 capsules in the morning, 2 capsules, and 3 capsules at bedtime. 210 capsule 1    methocarbamol (ROBAXIN) 500 MG tablet Take 2-3 tablets (1,000-1,500 mg) by mouth 3 times daily as needed for muscle spasms 210 tablet 2    methylPREDNISolone (MEDROL) 32 MG tablet Take 1 tablet (32 mg) by mouth daily 12 hours prior to the procedure with IV contrast 2 tablet 0    QUEtiapine (SEROQUEL) 400 MG tablet Take 1 tablet (400 mg) by mouth at bedtime 30 tablet 2    QUEtiapine (SEROQUEL) 50 MG tablet Take 0.5-1 tablets (25-50 mg) by mouth 2 times daily as needed (for sleep or acute agitation) 60 tablet 2    rivaroxaban ANTICOAGULANT (XARELTO) 20 MG TABS tablet Take 1 tablet (20 mg) by mouth daily (with dinner) 90 tablet 3    sertraline (ZOLOFT) 50 MG tablet Take 1 tablet (50 mg) by mouth daily 30 tablet 2    Vitamin D3 (CHOLECALCIFEROL) 25 mcg (1000 units) tablet Take 1 tablet (25 mcg) by mouth daily 90 tablet 3    abemaciclib (VERZENIO) 150 MG tablet Take 1 tablet (150 mg) by mouth 2 times daily for 28 days (Patient not taking: Reported on 5/9/2024) 56 tablet 0    exemestane (AROMASIN) 25 MG tablet Take 1 tablet (25 mg) by mouth daily (Patient not taking: Reported on 5/9/2024) 90 tablet 3    loperamide (IMODIUM) 2 MG capsule Start with 2 caps (4 mg), then take one cap (2 mg) after each diarrheal stool as needed. Do not use more than 8 caps (16 mg) per day. (Patient not taking: Reported on 5/9/2024) 30 capsule 0     methylPREDNISolone (MEDROL) 32 MG tablet Take 1 tablet (32 mg) 12 hours and again 2 hours prior to the procedure with IV contrast (Patient not taking: Reported on 5/9/2024) 2 tablet 0    naloxone (NARCAN) 4 MG/0.1ML nasal spray Spray 1 spray (4 mg) into one nostril alternating nostrils once as needed for opioid reversal every 2-3 minutes until assistance arrives (Patient not taking: Reported on 4/8/2024) 0.2 mL 0    ondansetron (ZOFRAN) 8 MG tablet Take 1 tablet (8 mg) by mouth every 8 hours as needed for nausea (Patient not taking: Reported on 2/6/2024) 30 tablet 3    pantoprazole (PROTONIX) 40 MG EC tablet Take 1 tablet (40 mg) by mouth every morning (before breakfast) (Patient not taking: Reported on 3/15/2024) 30 tablet 1    polyethylene glycol (MIRALAX) 17 GM/Dose powder Take 17 g by mouth daily as needed for constipation (Patient not taking: Reported on 5/9/2024) 578 g 3    prochlorperazine (COMPAZINE) 10 MG tablet Take 1 tablet (10 mg) by mouth every 6 hours as needed for nausea or vomiting (Patient not taking: Reported on 4/8/2024) 30 tablet 2    prochlorperazine (COMPAZINE) 10 MG tablet Take 1 tablet (10 mg) by mouth every 6 hours as needed for nausea or vomiting (Patient not taking: Reported on 4/8/2024) 30 tablet 3    SENNA-docusate sodium (SENNA S) 8.6-50 MG tablet Take 2 tablets by mouth 2 times daily as needed (Constipation) (Patient not taking: Reported on 5/9/2024) 100 tablet 3     No current facility-administered medications for this visit.     Facility-Administered Medications Ordered in Other Visits   Medication Dose Route Frequency Provider Last Rate Last Admin    lidocaine 1% with EPINEPHrine 1:100,000 injection 10 mL  10 mL Intradermal Once Sangeetha Vazquez MD       '  Physical Exam:   BP (!) 134/91 (BP Location: Right arm, Patient Position: Sitting, Cuff Size: Adult Large)   Pulse 70   Temp 98.1  F (36.7  C) (Oral)   Resp 12   Wt 106.5 kg (234 lb 11.2 oz)   SpO2 97%   BMI 32.73 kg/m     General:  Well appearing adult female in NAD.  Alert and oriented x 3.  HEENT:  Normocephalic.  Sclera anicteric. MMM.    Lymph:  No palpable cervical, supraclavicular, or axillary LAD.   Chest:  CTA bilaterally.  No wheezes or crackles.  CV:  RRR.  Abd:  Soft/ND/NT   Ext:  No edema of the bilateral lower extremities.    Neuro:  Cranial nerves grossly intact.  Alert and oriented x 3.  Psych:  Mood and affect appear normal.    Skin: She has some erythema, xerosis, thickened skin, and calluses on the soles of her feet and to a lesser degree on her hands     Laboratory/Imaging Studies:   I personally reviewed the below laboratories:   05/09/24 16:41   Sodium 143   Potassium 4.1   Chloride 109 (H)   Carbon Dioxide (CO2) 24   Urea Nitrogen 13.0   Creatinine 1.10 (H)   GFR Estimate 62   Calcium 9.3   Anion Gap 10   Albumin 4.0   Protein Total 7.1   Alkaline Phosphatase 77   ALT 9   AST 15   Bilirubin Total 0.2   Glucose 100 (H)   WBC 4.1   Hemoglobin 11.0 (L)   Hematocrit 32.8 (L)   Platelet Count 181   RBC Count 3.09 (L)    (H)   MCH 35.6 (H)   MCHC 33.5   RDW 12.9   % Neutrophils 55   % Lymphocytes 36   % Monocytes 6   % Eosinophils 1   % Basophils 2   Absolute Basophils 0.1   Absolute Eosinophils 0.1   Absolute Immature Granulocytes 0.0   Absolute Lymphocytes 1.5   Absolute Monocytes 0.3   % Immature Granulocytes 0   Absolute Neutrophils 2.3   Absolute NRBCs 0.0   NRBCs per 100 WBC 0       ASSESSMENT/PLAN:   48 year old female with history of DVT and ER positive left breast cancer metastasized to bones.    1.  Metastatic breast cancer: PET/CT in 12/2023 c/w disease progression in the left breast and bones including T7, left ilium, and left posterior acetabulum.  On treatment with abemaciclib and fulvestrant since 01/2024.      - Fulvestrant today.   - Continue abemaciclib 150 mg PO BID. Will message team about HFS--do not expect with abemaciclib but it does look very much like HFS.   - Plan to obtain PET/CT in  2 months to evaluate response to this treatment. Scheduled for next month.     2.  Bone metastases/low back pain with LLE sciatica:  She is s/p XRT to the left ilium as well as the lumbar spine and kyphoplasty of L4--there was some extravasation of cement which procedularist is aware of and recommended continued AC indefinitely.   - buprenorphine 20 mcg/hr patch, Robaxin, and gabapentin.  Ongoing follow up with palliative medicine.  - Pain mostly continues in low back.  - Confirmed she has been using tylenol instead of ibuprofen   - Receiving Zometa once every 3 months. Will schedule with next month's zoladex     3.  Schizoaffective disorder, bipolar type: She is on treatment with Seroquel and Zoloft. Ongoing follow up with psychiatry. Mental health has been better with getting outside.     4.  DVT:  No change since last visit.  She continues on Xarelto.    5. Diarrhea: G1. Secondary to abemaciclib.  Imodium prn for 3 loose stools in over 24 hours.     6.  HFS?: Continue thick emollients. Improving on this. Entire sole is involved so I do not think this is tinea or self-induced from peeling.     7.  Recent FELTON:  Creatinine improved to 1.1. Continue to avoid NSAIDs and encouraged good hydration.     Alee De Los Santos PA-C

## 2024-05-09 NOTE — NURSING NOTE
"Oncology Rooming Note    May 9, 2024 4:47 PM   Teresa Sanderson is a 48 year old female who presents for:    Chief Complaint   Patient presents with    Blood Draw     Labs collected from venipuncture by RN. Vitals taken. Checked in for appointment(s).     Oncology Clinic Visit     Malignant neoplasm of overlapping sites of left breast     Initial Vitals: BP (!) 134/91 (BP Location: Right arm, Patient Position: Sitting, Cuff Size: Adult Large)   Pulse 70   Temp 98.1  F (36.7  C) (Oral)   Resp 12   Wt 106.5 kg (234 lb 11.2 oz)   SpO2 97%   BMI 32.73 kg/m   Estimated body mass index is 32.73 kg/m  as calculated from the following:    Height as of 4/23/24: 1.803 m (5' 11\").    Weight as of this encounter: 106.5 kg (234 lb 11.2 oz). Body surface area is 2.31 meters squared.  Severe Pain (6) Comment: Data Unavailable   No LMP recorded. (Menstrual status: Tubal ).  Allergies reviewed: Yes  Medications reviewed: Yes    Medications: MEDICATION REFILLS NEEDED TODAY. Provider was notified.  Pharmacy name entered into [facility]:    [id number] - [place], [place] - [address] AT [facility] OF [address]  [facility] - [place], [place] - [address] [facility]  [facility][id number] - [place], [place] - [address] AT [facility] & [facility]  [facility] #[id number] - [place], [place] - [address] AT [address] [place] [address] [facility] - [place], [place] - [address]  [id number] - [place], [place] - [address] AT [facility] [address]    Frailty Screening:   Is the patient here for a new oncology consult visit in cancer care? 2. No      Clinical concerns: Seroquel both 50 and 400mg  gabapentin, methocarbamol, buprenorphine, xarelto, sertraline refills needed, Pt aware she may need to call for some if not prescribed here       Jane Sales              "

## 2024-05-09 NOTE — LETTER
5/9/2024         RE: Teresa Sanderson  [address]  [address]  [facility] [address]        Dear Colleague,    Thank you for referring your patient, Teresa Sanderson, to the [facility]. Please see a copy of my visit note below.    Oncology Visit:   Date on this visit: May 9, 2024    Diagnosis:  ER positive left breast cancer metastasized to bones.     Primary Physician: No Ref-Primary, Physician     History Of Present Illness:    Ms. Sanderson is a 48 year old female with a h/o tobacco abuse and DVTs with left breast cancer metastasized to bone. She presented to [facility] ED with back pain on 12/5/2020. MRI of the L-spine showed an abnormal L4 lesion with associated right paraspinal mass, abnormalities in L5 and the left iliac bone were also seen. CT C/A/P showed a left breast mass, lytic lesions of T7, L4, and the pelvis, and a 3 cm lesion in the kidney (thought to be a cyst). Ultrasound of the bilateral lower extremities showed a non-occlusive thrombus in the left popliteal vein. Mammogram and ultrasound of the bilateral breasts on 12/17/2020 showed a spiculated mass measuring at least 7.8 cm at 12-1:00 left breast extending from the nipple to 9 cm from the nipple with associated nipple retraction. This mass was biopsied, and showed IDC with surrounding DCIS, grade 3, ER+ 90%, and MI+ 75%.  HER2 was equivocal in approximately 35% of tumor cells by FISH and was negative by IHC.    Metastatic Breast Cancer Treatment:  12/23/2021 - 1/7/2021  Radiation (3000 cGy) to the lumbar spine.  1/29/2021 - present  Ibrance, zoladex, and anastrozole.  5/17/2021 radiofrequency ablation, kyphoplasty to L4  8/20/2021  Bilateral salpingo-oophorectomies, Ibrance and anastrozole.  She was off anastrozole 12/2021 - 2/2022 and off Ibrance 01/2022 - 02/2022 due to stress and impending homelessness. Off both again 03/2022-04/2022 due to death of her son but restarted in 05/2022.  04/2023  PET/CT  with progression in 2 small metastases in the left pelvis.  Palbociclib continued.  Anastrozole changed to exemestane  5/5/2023  Completed radiation, 2000 cGy in 5 fractions, to the left ilium.  12/19/2023 Left breast biopsy  [facility] NGS:   ER positive, 3+  100%   GA positive, 1+, 5%   HER2 negative.   AR positive, 1+, 35%   MMR proficient   PD-L1 negative, CPS 0   PTEN positive, 1+ 100%  *Of note, all of the above was IHC, DNA quantity not sufficient for NGS  1/18/24 Fulvestrant + abemaciclib     Interval History:   Teresa comes into clinic for metastatic breast cancer follow up.  She is on treatment with Verzenio and fulvestrant. She is feeling well. She has been taking the bus more to [place] for shopping and has been going to [place] with her grandkids. Getting out of the apartment has greatly been helping her mental health. She overall is feeling better because of this too. Her back and hip pain is the same. No longer having any groin pain. She needs refills today. Diarrhea has continued to be minimal, maybe 1-2x per week. She has not needed Imodium. No nausea. Good appetite. The only recent change has been tenderness along the bottoms of her feet with some warmth and peeling. Her hands have had this to some degree as well. No mucositis or rash. She has been using Eucerin and vaseline and the tenderness is better. She thinks some of this was picking peeling skin off of her feet. Timeline is hard to gauge--it has been improved as of a few days ago.     No fevers/chills. No cough or SOB. ST from last week is gone.       Past Medical/Surgical History:   Past Medical History:   Diagnosis Date    Anxiety     Breast CA (H) 12/2020    Depression     DVT (deep venous thrombosis) (H) 2014    Left breast mass     x approximately 4-5 months    Pulmonary emboli (H)     Pyelonephritis     Schizoaffective disorder (H)     Tobacco use      Past Surgical History:   Procedure Laterality Date    COLONOSCOPY N/A  7/8/2022    Procedure: COLONOSCOPY, WITH POLYPECTOMY;  Surgeon: Ham Cano MD;  Location: [facility] OR    IR LUMBAR KYPHOPLASTY VERTEBRAE  5/17/2021    LAPAROSCOPIC SALPINGO-OOPHORECTOMY Bilateral 8/20/2021    Procedure: BILATERAL SALPINGO-OOPHORECTOMY, LAPAROSCOPIC;  Surgeon: Rory Lopez MD;  Location: [facility] OR    TUBAL LIGATION  1998        Allergies   Allergen Reactions    Contrast Dye      Pt developed nausea after isovue 370 injection on 6/9/21        Current Outpatient Medications   Medication Sig Dispense Refill    abemaciclib (VERZENIO) 150 MG tablet Take 1 tablet (150 mg) by mouth 2 times daily for 28 days 56 tablet 0    buprenorphine (BUTRANS) 20 MCG/HR WK patch Place 1 patch onto the skin every 7 days 4 patch 2    gabapentin (NEURONTIN) 300 MG capsule Take 2 capsules in the morning, 2 capsules, and 3 capsules at bedtime. 210 capsule 1    methocarbamol (ROBAXIN) 500 MG tablet Take 2-3 tablets (1,000-1,500 mg) by mouth 3 times daily as needed for muscle spasms 210 tablet 2    methylPREDNISolone (MEDROL) 32 MG tablet Take 1 tablet (32 mg) by mouth daily 12 hours prior to the procedure with IV contrast 2 tablet 0    QUEtiapine (SEROQUEL) 400 MG tablet Take 1 tablet (400 mg) by mouth at bedtime 30 tablet 2    QUEtiapine (SEROQUEL) 50 MG tablet Take 0.5-1 tablets (25-50 mg) by mouth 2 times daily as needed (for sleep or acute agitation) 60 tablet 2    rivaroxaban ANTICOAGULANT (XARELTO) 20 MG TABS tablet Take 1 tablet (20 mg) by mouth daily (with dinner) 90 tablet 3    sertraline (ZOLOFT) 50 MG tablet Take 1 tablet (50 mg) by mouth daily 30 tablet 2    Vitamin D3 (CHOLECALCIFEROL) 25 mcg (1000 units) tablet Take 1 tablet (25 mcg) by mouth daily 90 tablet 3    abemaciclib (VERZENIO) 150 MG tablet Take 1 tablet (150 mg) by mouth 2 times daily for 28 days (Patient not taking: Reported on 5/9/2024) 56 tablet 0    exemestane (AROMASIN) 25 MG tablet Take 1 tablet (25 mg) by mouth daily (Patient not  taking: Reported on 5/9/2024) 90 tablet 3    loperamide (IMODIUM) 2 MG capsule Start with 2 caps (4 mg), then take one cap (2 mg) after each diarrheal stool as needed. Do not use more than 8 caps (16 mg) per day. (Patient not taking: Reported on 5/9/2024) 30 capsule 0    methylPREDNISolone (MEDROL) 32 MG tablet Take 1 tablet (32 mg) 12 hours and again 2 hours prior to the procedure with IV contrast (Patient not taking: Reported on 5/9/2024) 2 tablet 0    naloxone (NARCAN) 4 MG/0.1ML nasal spray Spray 1 spray (4 mg) into one nostril alternating nostrils once as needed for opioid reversal every 2-3 minutes until assistance arrives (Patient not taking: Reported on 4/8/2024) 0.2 mL 0    ondansetron (ZOFRAN) 8 MG tablet Take 1 tablet (8 mg) by mouth every 8 hours as needed for nausea (Patient not taking: Reported on 2/6/2024) 30 tablet 3    pantoprazole (PROTONIX) 40 MG EC tablet Take 1 tablet (40 mg) by mouth every morning (before breakfast) (Patient not taking: Reported on 3/15/2024) 30 tablet 1    polyethylene glycol (MIRALAX) 17 GM/Dose powder Take 17 g by mouth daily as needed for constipation (Patient not taking: Reported on 5/9/2024) 578 g 3    prochlorperazine (COMPAZINE) 10 MG tablet Take 1 tablet (10 mg) by mouth every 6 hours as needed for nausea or vomiting (Patient not taking: Reported on 4/8/2024) 30 tablet 2    prochlorperazine (COMPAZINE) 10 MG tablet Take 1 tablet (10 mg) by mouth every 6 hours as needed for nausea or vomiting (Patient not taking: Reported on 4/8/2024) 30 tablet 3    SENNA-docusate sodium (SENNA S) 8.6-50 MG tablet Take 2 tablets by mouth 2 times daily as needed (Constipation) (Patient not taking: Reported on 5/9/2024) 100 tablet 3     No current facility-administered medications for this visit.     Facility-Administered Medications Ordered in Other Visits   Medication Dose Route Frequency Provider Last Rate Last Admin    lidocaine 1% with EPINEPHrine 1:100,000 injection 10 mL  10 mL  Intradermal Once Sangeetha Vazquez MD '  Physical Exam:   BP (!) 134/91 (BP Location: Right arm, Patient Position: Sitting, Cuff Size: Adult Large)   Pulse 70   Temp 98.1  F (36.7  C) (Oral)   Resp 12   Wt 106.5 kg (234 lb 11.2 oz)   SpO2 97%   BMI 32.73 kg/m    General:  Well appearing adult female in NAD.  Alert and oriented x 3.  HEENT:  Normocephalic.  Sclera anicteric. MMM.    Lymph:  No palpable cervical, supraclavicular, or axillary LAD.   Chest:  CTA bilaterally.  No wheezes or crackles.  CV:  RRR.  Abd:  Soft/ND/NT   Ext:  No edema of the bilateral lower extremities.    Neuro:  Cranial nerves grossly intact.  Alert and oriented x 3.  Psych:  Mood and affect appear normal.    Skin: She has some erythema, xerosis, thickened skin, and calluses on the soles of her feet and to a lesser degree on her hands     Laboratory/Imaging Studies:   I personally reviewed the below laboratories:   05/09/24 16:41   Sodium 143   Potassium 4.1   Chloride 109 (H)   Carbon Dioxide (CO2) 24   Urea Nitrogen 13.0   Creatinine 1.10 (H)   GFR Estimate 62   Calcium 9.3   Anion Gap 10   Albumin 4.0   Protein Total 7.1   Alkaline Phosphatase 77   ALT 9   AST 15   Bilirubin Total 0.2   Glucose 100 (H)   WBC 4.1   Hemoglobin 11.0 (L)   Hematocrit 32.8 (L)   Platelet Count 181   RBC Count 3.09 (L)    (H)   MCH 35.6 (H)   MCHC 33.5   RDW 12.9   % Neutrophils 55   % Lymphocytes 36   % Monocytes 6   % Eosinophils 1   % Basophils 2   Absolute Basophils 0.1   Absolute Eosinophils 0.1   Absolute Immature Granulocytes 0.0   Absolute Lymphocytes 1.5   Absolute Monocytes 0.3   % Immature Granulocytes 0   Absolute Neutrophils 2.3   Absolute NRBCs 0.0   NRBCs per 100 WBC 0       ASSESSMENT/PLAN:   48 year old female with history of DVT and ER positive left breast cancer metastasized to bones.    1.  Metastatic breast cancer: PET/CT in 12/2023 c/w disease progression in the left breast and bones including T7, left ilium, and left  posterior acetabulum.  On treatment with abemaciclib and fulvestrant since 01/2024.      - Fulvestrant today.   - Continue abemaciclib 150 mg PO BID. Will message team about HFS--do not expect with abemaciclib but it does look very much like HFS.   - Plan to obtain PET/CT in 2 months to evaluate response to this treatment. Scheduled for next month.     2.  Bone metastases/low back pain with LLE sciatica:  She is s/p XRT to the left ilium as well as the lumbar spine and kyphoplasty of L4--there was some extravasation of cement which procedularist is aware of and recommended continued AC indefinitely.   - buprenorphine 20 mcg/hr patch, Robaxin, and gabapentin.  Ongoing follow up with palliative medicine.  - Pain mostly continues in low back.  - Confirmed she has been using tylenol instead of ibuprofen   - Receiving Zometa once every 3 months. Will schedule with next month's zoladex     3.  Schizoaffective disorder, bipolar type: She is on treatment with Seroquel and Zoloft. Ongoing follow up with psychiatry. Mental health has been better with getting outside.     4.  DVT:  No change since last visit.  She continues on Xarelto.    5. Diarrhea: G1. Secondary to abemaciclib.  Imodium prn for 3 loose stools in over 24 hours.     6.  HFS?: Continue thick emollients. Improving on this. Entire sole is involved so I do not think this is tinea or self-induced from peeling.     7.  Recent FELTON:  Creatinine improved to 1.1. Continue to avoid NSAIDs and encouraged good hydration.     Alee De Los Santos PA-C

## 2024-05-10 DIAGNOSIS — Z98.890 BACK PAIN WITH HISTORY OF SPINAL SURGERY: ICD-10-CM

## 2024-05-10 DIAGNOSIS — M54.9 BACK PAIN WITH HISTORY OF SPINAL SURGERY: ICD-10-CM

## 2024-05-10 DIAGNOSIS — C79.51 METASTASIS TO BONE (H): ICD-10-CM

## 2024-05-10 DIAGNOSIS — G89.3 CANCER ASSOCIATED PAIN: ICD-10-CM

## 2024-05-10 DIAGNOSIS — C50.812 MALIGNANT NEOPLASM OF OVERLAPPING SITES OF LEFT BREAST IN FEMALE, ESTROGEN RECEPTOR POSITIVE (H): Primary | ICD-10-CM

## 2024-05-10 DIAGNOSIS — Z17.0 MALIGNANT NEOPLASM OF OVERLAPPING SITES OF LEFT BREAST IN FEMALE, ESTROGEN RECEPTOR POSITIVE (H): Primary | ICD-10-CM

## 2024-05-10 LAB
CANCER AG15-3 SERPL-ACNC: 64 U/ML
CEA SERPL-MCNC: 4.8 NG/ML

## 2024-05-10 RX ORDER — BUPRENORPHINE 10 UG/H
1 PATCH TRANSDERMAL
Qty: 4 PATCH | Refills: 0 | Status: SHIPPED | OUTPATIENT
Start: 2024-05-10 | End: 2024-05-14

## 2024-05-10 NOTE — TELEPHONE ENCOUNTER
Patient previously reported to Dr. Tellez at her last visit on 4/23 that she no longer had a Butrans patch on and she did not notice much worsening of her pain.  Oncology reached out to Dr. Tellez today saying that patient is requesting a refill of the patch.  Spoke with patient.  She would like to restart the Butrans patch.  Explained to her that we would first have her start with a 10 mcg/h patch and after a week she could increase to 20 mcg/h.    Last refill: 2/12/24  Last office visit: 4/23/24  Scheduled for follow up 6/24/24     Will route request to MD for review.     Reviewed [place]  Report.

## 2024-05-12 RX ORDER — LAMOTRIGINE 25 MG/1
500 TABLET ORAL ONCE
Status: CANCELLED | OUTPATIENT
Start: 2024-06-06 | End: 2024-06-06

## 2024-05-13 ENCOUNTER — TELEPHONE (OUTPATIENT)
Dept: ONCOLOGY | Facility: CLINIC | Age: 49
End: 2024-05-13
Payer: MEDICARE

## 2024-05-13 DIAGNOSIS — C79.51 CARCINOMA OF LEFT BREAST METASTATIC TO BONE (H): Primary | ICD-10-CM

## 2024-05-13 DIAGNOSIS — C50.812 MALIGNANT NEOPLASM OF OVERLAPPING SITES OF LEFT BREAST IN FEMALE, ESTROGEN RECEPTOR POSITIVE (H): ICD-10-CM

## 2024-05-13 DIAGNOSIS — C50.912 CARCINOMA OF LEFT BREAST METASTATIC TO BONE (H): Primary | ICD-10-CM

## 2024-05-13 DIAGNOSIS — Z17.0 MALIGNANT NEOPLASM OF OVERLAPPING SITES OF LEFT BREAST IN FEMALE, ESTROGEN RECEPTOR POSITIVE (H): ICD-10-CM

## 2024-05-13 NOTE — TELEPHONE ENCOUNTER
Hpi Title: Evaluation of Skin Lesions Oral Chemotherapy Monitoring Program     Placed call to patient in follow up of oral chemotherapy. Left message requesting call back. No drug names were mentioned. Will update when response received.     Carmen Ramírez, PharmD, BCOP  Oral Chemotherapy Monitoring Program  [facility]   [phone]     Detail Level: Zone How Severe Are Your Spot(S)?: mild Have Your Spot(S) Been Treated In The Past?: has not been treated

## 2024-05-14 ENCOUNTER — NURSE TRIAGE (OUTPATIENT)
Dept: ONCOLOGY | Facility: CLINIC | Age: 49
End: 2024-05-14
Payer: MEDICARE

## 2024-05-14 DIAGNOSIS — C79.51 METASTASIS TO BONE (H): ICD-10-CM

## 2024-05-14 DIAGNOSIS — C50.812 MALIGNANT NEOPLASM OF OVERLAPPING SITES OF LEFT BREAST IN FEMALE, ESTROGEN RECEPTOR POSITIVE (H): ICD-10-CM

## 2024-05-14 DIAGNOSIS — Z17.0 MALIGNANT NEOPLASM OF OVERLAPPING SITES OF LEFT BREAST IN FEMALE, ESTROGEN RECEPTOR POSITIVE (H): ICD-10-CM

## 2024-05-14 DIAGNOSIS — M54.9 BACK PAIN WITH HISTORY OF SPINAL SURGERY: ICD-10-CM

## 2024-05-14 DIAGNOSIS — Z98.890 BACK PAIN WITH HISTORY OF SPINAL SURGERY: ICD-10-CM

## 2024-05-14 DIAGNOSIS — M79.661 PAIN OF RIGHT LOWER LEG: ICD-10-CM

## 2024-05-14 DIAGNOSIS — G89.3 CANCER ASSOCIATED PAIN: ICD-10-CM

## 2024-05-14 RX ORDER — BUPRENORPHINE 10 UG/H
1 PATCH TRANSDERMAL
Qty: 4 PATCH | Refills: 0 | Status: SHIPPED | OUTPATIENT
Start: 2024-05-14 | End: 2024-06-24

## 2024-05-14 RX ORDER — GABAPENTIN 300 MG/1
CAPSULE ORAL
Qty: 210 CAPSULE | Refills: 1 | Status: SHIPPED | OUTPATIENT
Start: 2024-05-14 | End: 2024-05-14

## 2024-05-14 RX ORDER — BUPRENORPHINE 20 UG/H
1 PATCH TRANSDERMAL
OUTPATIENT
Start: 2024-05-14

## 2024-05-14 RX ORDER — GABAPENTIN 300 MG/1
CAPSULE ORAL
Qty: 210 CAPSULE | Refills: 1 | Status: SHIPPED | OUTPATIENT
Start: 2024-05-14

## 2024-05-14 NOTE — TELEPHONE ENCOUNTER
"Abimbola from [facility] called, they need clarification on Gabapentin directions prescribed by Alee De Los Santos on 5/9/2024.     Need clarification in directions, the middle part states \"2 capsules\", pharmacy cannot assume if it means in afternoon or when.      Last provider note mentions gabapentin in general without specifics.     1110 Secure chat sent to provider Alee.     1113 Per Alee, 2 capsules in the morning, 2 capsules in the afternoon, and 3 capsules at bedtime.     1120 This writer spoke to Abimbola verifying they received the updated Gabapentin updated prescription.   Abimbola has questions about clarifying Buprenorphine patches, patient's insurance only covers 1 patch per week, so needing refill for buprenorphine 20mcg patches.       "

## 2024-05-14 NOTE — TELEPHONE ENCOUNTER
Resending prescription for butrans 10 mcg patches. Insurance will not cover more than 1 patch per week so pharmacy suggested sending in the order for 1 patch per week x 1 month, then when dose is increased in the next week, a new order can be sent in for the 20 mcg patches.    Aisha Shelton, MAXIN, RN  Palliative Care Nurse Clinician    [phone] (Direct)  [phone] (Main)  [phone] (Appointment Scheduling)

## 2024-05-14 NOTE — TELEPHONE ENCOUNTER
Medication:Buprenorphine 20mcg patches, b/c insurance does not cover 2 patches, they only cover 1 patch so pt would be unable to increase to 2 patches.   Last prescribing provider:Dr. Ayanna Scott  Last clinic visit date: 5/9/2024 with Oncology Alee De Los Santos Pa-C  Recommendations for requested medication:managed by palliative care  Any other pertinent information:routed to palliative care.

## 2024-05-22 ENCOUNTER — TELEPHONE (OUTPATIENT)
Dept: ONCOLOGY | Facility: CLINIC | Age: 49
End: 2024-05-22
Payer: MEDICARE

## 2024-05-22 NOTE — TELEPHONE ENCOUNTER
Oral Chemotherapy Monitoring Program    Subjective/Objective:  Teresa Sanderson is a 48 year old female contacted by phone for dose reduction visit for oral chemotherapy. Ms. Sanderson confirms taking Verzenio 1 tablet (100 mg) two times daily. Patient stated that in the last 2 weeks missed one dose because she forgot to take it. Patient doesn't have any new OTC or prescribed medications.Patient started the new reduced dose of Verzenio 5 days ago.     Teresa stated that her fatigue had worsen, she has issues to wake up and do physical activities. She mentioned that she has to push herself to get out of the house and do physical activities. But even thought patient struggle with fatigue, she said that she tries to spend time with her grand kids, take bus to go shopping or see them. Teresa mentioned  that her hand/foot pain got much better and not bothering her. She denies any new rash/ blisters/ redness/ swellings in her hands/feet. She still use Eucerin and Vaseline as needed. Patient denies any nausea/ diarrhea/ constipation/ vomiting/ abdominal pain/ chills or fever.        2/14/2024     8:00 AM 3/5/2024    10:00 AM 3/15/2024     2:00 PM 4/10/2024     1:00 PM 5/8/2024    12:00 PM 5/13/2024    10:00 AM 5/22/2024    11:00 AM   ORAL CHEMOTHERAPY   Assessment Type Refill Monthly Follow up Refill Refill Refill Refill Initial Follow up   Diagnosis Code Breast Cancer Breast Cancer Breast Cancer Breast Cancer Breast Cancer Breast Cancer Breast Cancer   Providers Dr. Dimitrios Plunkett   [facility]   Is this patient followed by the [facility] OC team? No No No No No No No   Drug Name Verzenio (abemaciclib) Verzenio (abemaciclib) Verzenio (abemaciclib) Verzenio (abemaciclib) Verzenio (abemaciclib) Verzenio (abemaciclib) Verzenio (abemaciclib)   Dose 150 mg 150 mg 150 mg 150 mg 150 mg 100 mg 100  "mg   Current Schedule BID BID BID BID BID BID BID   Cycle Details Continuous Continuous Continuous Continuous Continuous Continuous Continuous   Start Date of Last Cycle   2/21/2024       Planned next cycle start date   3/20/2024       Doses missed in last 2 weeks  1     1   Adherence Assessment  Adherent     Adherent   Adverse Effects  No AE identified during assessment     Fatigue   Fatigue       Grade 2   Pharmacist Intervention(fatigue)       Yes       Last PHQ-2 Score on record:       2/23/2024     2:48 PM 10/4/2022    10:02 AM   PHQ-2 ( 1999 Pf[employer])   Q1: Little interest or pleasure in doing things 2 3   Q2: Feeling down, depressed or hopeless 1 3   PHQ-2 Score 3 6   PHQ-2 Total Score (12-17 Years)- Positive if 3 or more points; Administer PHQ-A if positive 3        Vitals:  BP:   BP Readings from Last 1 Encounters:   05/09/24 (!) 134/91     Wt Readings from Last 1 Encounters:   05/09/24 106.5 kg (234 lb 11.2 oz)     Estimated body surface area is 2.31 meters squared as calculated from the following:    Height as of 4/23/24: 1.803 m (5' 11\").    Weight as of 5/9/24: 106.5 kg (234 lb 11.2 oz).    Labs:  _  Result Component Current Result Ref Range   Sodium 143 (5/9/2024) 135 - 145 mmol/L     _  Result Component Current Result Ref Range   Potassium 4.1 (5/9/2024) 3.4 - 5.3 mmol/L     _  Result Component Current Result Ref Range   Calcium 9.3 (5/9/2024) 8.6 - 10.0 mg/dL     No results found for Mag within last 30 days.     No results found for Phos within last 30 days.     _  Result Component Current Result Ref Range   Albumin 4.0 (5/9/2024) 3.5 - 5.2 g/dL     _  Result Component Current Result Ref Range   Urea Nitrogen 13.0 (5/9/2024) 6.0 - 20.0 mg/dL     _  Result Component Current Result Ref Range   Creatinine 1.10 (H) (5/9/2024) 0.51 - 0.95 mg/dL     _  Result Component Current Result Ref Range   AST 15 (5/9/2024) 0 - 45 U/L     _  Result Component Current Result Ref Range   ALT 9 (5/9/2024) 0 - 50 U/L "     _  Result Component Current Result Ref Range   Bilirubin Total 0.2 (5/9/2024) <=1.2 mg/dL     _  Result Component Current Result Ref Range   WBC Count 4.1 (5/9/2024) 4.0 - 11.0 10e3/uL     _  Result Component Current Result Ref Range   Hemoglobin 11.0 (L) (5/9/2024) 11.7 - 15.7 g/dL     _  Result Component Current Result Ref Range   Platelet Count 181 (5/9/2024) 150 - 450 10e3/uL     No results found for ANC within last 30 days.     _  Result Component Current Result Ref Range   Absolute Neutrophils 2.3 (5/9/2024) 1.6 - 8.3 10e3/uL          Assessment/Plan:  Continue therapy as planned.      No concerning abnormalities or adverse effects as a result of her Verzenio. Patient was advised to stay active as much as she can and try do physical activities such as walk outside or play with her grand kids. Also, she was educated to drink plenty of fluids and keep her fluid intake in general.    Follow-Up:  Lab with infusion 6/6.  Provider appointment with Jahaira 6/10.    Refill Due:  6/5    Linda Stafford  Pharmacy Intern  Oral Chemotherapy Monitoring Program  [facility]  [phone]

## 2024-05-31 DIAGNOSIS — C50.812 MALIGNANT NEOPLASM OF OVERLAPPING SITES OF LEFT BREAST IN FEMALE, ESTROGEN RECEPTOR POSITIVE (H): ICD-10-CM

## 2024-05-31 DIAGNOSIS — C79.51 CARCINOMA OF LEFT BREAST METASTATIC TO BONE (H): Primary | ICD-10-CM

## 2024-05-31 DIAGNOSIS — Z17.0 MALIGNANT NEOPLASM OF OVERLAPPING SITES OF LEFT BREAST IN FEMALE, ESTROGEN RECEPTOR POSITIVE (H): ICD-10-CM

## 2024-05-31 DIAGNOSIS — C50.912 CARCINOMA OF LEFT BREAST METASTATIC TO BONE (H): Primary | ICD-10-CM

## 2024-05-31 RX ORDER — LAMOTRIGINE 25 MG/1
500 TABLET ORAL ONCE
Status: CANCELLED | OUTPATIENT
Start: 2024-07-04 | End: 2024-07-04

## 2024-06-06 ENCOUNTER — INFUSION THERAPY VISIT (OUTPATIENT)
Dept: ONCOLOGY | Facility: CLINIC | Age: 49
End: 2024-06-06
Attending: INTERNAL MEDICINE
Payer: MEDICARE

## 2024-06-06 ENCOUNTER — LAB (OUTPATIENT)
Dept: LAB | Facility: CLINIC | Age: 49
End: 2024-06-06
Attending: INTERNAL MEDICINE
Payer: MEDICARE

## 2024-06-06 ENCOUNTER — HOSPITAL ENCOUNTER (OUTPATIENT)
Dept: PET IMAGING | Facility: CLINIC | Age: 49
Discharge: HOME | End: 2024-06-06
Attending: INTERNAL MEDICINE
Payer: MEDICARE

## 2024-06-06 ENCOUNTER — TELEPHONE (OUTPATIENT)
Dept: ONCOLOGY | Facility: CLINIC | Age: 49
End: 2024-06-06

## 2024-06-06 VITALS
BODY MASS INDEX: 32.92 KG/M2 | DIASTOLIC BLOOD PRESSURE: 98 MMHG | RESPIRATION RATE: 20 BRPM | TEMPERATURE: 98 F | OXYGEN SATURATION: 97 % | SYSTOLIC BLOOD PRESSURE: 159 MMHG | WEIGHT: 236 LBS | HEART RATE: 64 BPM

## 2024-06-06 DIAGNOSIS — C50.912 CARCINOMA OF LEFT BREAST METASTATIC TO BONE (H): ICD-10-CM

## 2024-06-06 DIAGNOSIS — Z17.0 MALIGNANT NEOPLASM OF OVERLAPPING SITES OF LEFT BREAST IN FEMALE, ESTROGEN RECEPTOR POSITIVE (H): ICD-10-CM

## 2024-06-06 DIAGNOSIS — C50.812 MALIGNANT NEOPLASM OF OVERLAPPING SITES OF LEFT BREAST IN FEMALE, ESTROGEN RECEPTOR POSITIVE (H): ICD-10-CM

## 2024-06-06 DIAGNOSIS — C50.912 CARCINOMA OF LEFT BREAST METASTATIC TO BONE (H): Primary | ICD-10-CM

## 2024-06-06 DIAGNOSIS — C50.919 BREAST CANCER (H): ICD-10-CM

## 2024-06-06 DIAGNOSIS — C79.51 CARCINOMA OF LEFT BREAST METASTATIC TO BONE (H): ICD-10-CM

## 2024-06-06 DIAGNOSIS — C79.51 CARCINOMA OF LEFT BREAST METASTATIC TO BONE (H): Primary | ICD-10-CM

## 2024-06-06 DIAGNOSIS — C50.812 MALIGNANT NEOPLASM OF OVERLAPPING SITES OF LEFT BREAST IN FEMALE, ESTROGEN RECEPTOR POSITIVE (H): Primary | ICD-10-CM

## 2024-06-06 DIAGNOSIS — Z17.0 MALIGNANT NEOPLASM OF OVERLAPPING SITES OF LEFT BREAST IN FEMALE, ESTROGEN RECEPTOR POSITIVE (H): Primary | ICD-10-CM

## 2024-06-06 LAB
ALBUMIN SERPL BCG-MCNC: 4 G/DL (ref 3.5–5.2)
ALP SERPL-CCNC: 94 U/L (ref 40–150)
ALT SERPL W P-5'-P-CCNC: 15 U/L (ref 0–50)
ANION GAP SERPL CALCULATED.3IONS-SCNC: 12 MMOL/L (ref 7–15)
AST SERPL W P-5'-P-CCNC: 18 U/L (ref 0–45)
BASOPHILS # BLD AUTO: 0.1 10E3/UL (ref 0–0.2)
BASOPHILS NFR BLD AUTO: 1 %
BILIRUB SERPL-MCNC: 0.2 MG/DL
BUN SERPL-MCNC: 10.3 MG/DL (ref 6–20)
CALCIUM SERPL-MCNC: 9.3 MG/DL (ref 8.6–10)
CHLORIDE SERPL-SCNC: 105 MMOL/L (ref 98–107)
CREAT SERPL-MCNC: 1.03 MG/DL (ref 0.51–0.95)
DEPRECATED HCO3 PLAS-SCNC: 26 MMOL/L (ref 22–29)
EGFRCR SERPLBLD CKD-EPI 2021: 67 ML/MIN/1.73M2
EOSINOPHIL # BLD AUTO: 0.1 10E3/UL (ref 0–0.7)
EOSINOPHIL NFR BLD AUTO: 1 %
ERYTHROCYTE [DISTWIDTH] IN BLOOD BY AUTOMATED COUNT: 12.2 % (ref 10–15)
GLUCOSE SERPL-MCNC: 131 MG/DL (ref 70–99)
HCT VFR BLD AUTO: 32.5 % (ref 35–47)
HGB BLD-MCNC: 11 G/DL (ref 11.7–15.7)
IMM GRANULOCYTES # BLD: 0 10E3/UL
IMM GRANULOCYTES NFR BLD: 1 %
LYMPHOCYTES # BLD AUTO: 1.4 10E3/UL (ref 0.8–5.3)
LYMPHOCYTES NFR BLD AUTO: 32 %
MCH RBC QN AUTO: 35.4 PG (ref 26.5–33)
MCHC RBC AUTO-ENTMCNC: 33.8 G/DL (ref 31.5–36.5)
MCV RBC AUTO: 105 FL (ref 78–100)
MONOCYTES # BLD AUTO: 0.3 10E3/UL (ref 0–1.3)
MONOCYTES NFR BLD AUTO: 6 %
NEUTROPHILS # BLD AUTO: 2.6 10E3/UL (ref 1.6–8.3)
NEUTROPHILS NFR BLD AUTO: 59 %
NRBC # BLD AUTO: 0 10E3/UL
NRBC BLD AUTO-RTO: 0 /100
PLATELET # BLD AUTO: 149 10E3/UL (ref 150–450)
POTASSIUM SERPL-SCNC: 3.5 MMOL/L (ref 3.4–5.3)
PROT SERPL-MCNC: 7.3 G/DL (ref 6.4–8.3)
RBC # BLD AUTO: 3.11 10E6/UL (ref 3.8–5.2)
SODIUM SERPL-SCNC: 143 MMOL/L (ref 135–145)
WBC # BLD AUTO: 4.4 10E3/UL (ref 4–11)

## 2024-06-06 PROCEDURE — G1010 CDSM STANSON: HCPCS | Performed by: RADIOLOGY

## 2024-06-06 PROCEDURE — A9552 F18 FDG: HCPCS | Performed by: INTERNAL MEDICINE

## 2024-06-06 PROCEDURE — 343N000001 HC RX 343: Performed by: INTERNAL MEDICINE

## 2024-06-06 PROCEDURE — 96402 CHEMO HORMON ANTINEOPL SQ/IM: CPT

## 2024-06-06 PROCEDURE — 86300 IMMUNOASSAY TUMOR CA 15-3: CPT

## 2024-06-06 PROCEDURE — 85049 AUTOMATED PLATELET COUNT: CPT

## 2024-06-06 PROCEDURE — 96365 THER/PROPH/DIAG IV INF INIT: CPT | Mod: XU

## 2024-06-06 PROCEDURE — 78816 PET IMAGE W/CT FULL BODY: CPT | Mod: 26 | Performed by: RADIOLOGY

## 2024-06-06 PROCEDURE — 82040 ASSAY OF SERUM ALBUMIN: CPT

## 2024-06-06 PROCEDURE — 82378 CARCINOEMBRYONIC ANTIGEN: CPT

## 2024-06-06 PROCEDURE — 78816 PET IMAGE W/CT FULL BODY: CPT | Mod: MG,PS

## 2024-06-06 PROCEDURE — 250N000011 HC RX IP 250 OP 636: Mod: JZ | Performed by: PHYSICIAN ASSISTANT

## 2024-06-06 PROCEDURE — 84155 ASSAY OF PROTEIN SERUM: CPT

## 2024-06-06 PROCEDURE — 36415 COLL VENOUS BLD VENIPUNCTURE: CPT

## 2024-06-06 PROCEDURE — 250N000011 HC RX IP 250 OP 636: Mod: JZ | Performed by: INTERNAL MEDICINE

## 2024-06-06 RX ORDER — ZOLEDRONIC ACID 0.04 MG/ML
4 INJECTION, SOLUTION INTRAVENOUS ONCE
Status: COMPLETED | OUTPATIENT
Start: 2024-06-06 | End: 2024-06-06

## 2024-06-06 RX ORDER — FLUDEOXYGLUCOSE F 18 200 MCI/ML
10-18 INJECTION, SOLUTION INTRAVENOUS ONCE
Status: COMPLETED | OUTPATIENT
Start: 2024-06-06 | End: 2024-06-06

## 2024-06-06 RX ORDER — LAMOTRIGINE 25 MG/1
500 TABLET ORAL ONCE
Status: COMPLETED | OUTPATIENT
Start: 2024-06-06 | End: 2024-06-06

## 2024-06-06 RX ADMIN — ZOLEDRONIC ACID 4 MG: 0.04 INJECTION, SOLUTION INTRAVENOUS at 14:54

## 2024-06-06 RX ADMIN — FLUDEOXYGLUCOSE F 18 14.02 MILLICURIE: 200 INJECTION, SOLUTION INTRAVENOUS at 11:34

## 2024-06-06 RX ADMIN — FULVESTRANT 500 MG: 50 INJECTION, SOLUTION INTRAMUSCULAR at 14:40

## 2024-06-06 ASSESSMENT — PAIN SCALES - GENERAL: PAINLEVEL: SEVERE PAIN (7)

## 2024-06-06 NOTE — PATIENT INSTRUCTIONS
[facility] Triage and after hours / weekends / holidays:  [phone]    Please call the triage or after hours line if you experience a temperature greater than or equal to 100.4, shaking chills, have uncontrolled nausea, vomiting and/or diarrhea, dizziness, shortness of breath, chest pain, bleeding, unexplained bruising, or if you have any other new/concerning symptoms, questions or concerns.      If you are having any concerning symptoms or wish to speak to a provider before your next infusion visit, please call triage to notify them so we can adequately serve you.     If you need a refill on a narcotic prescription or other medication, please call before your infusion appointment.                June 2024 Sunday Monday Tuesday Wednesday Thursday Friday Saturday                                 1       2     3     4     5     6    PE [facility]/ ONCOLOGY  11:30 AM   (30 min.)   [id number]   [facility]    LAB PERIPHERAL   2:30 PM   (15 min.)   [facility] LAB DRAW   [facility]    ONC INFUSION 1 HR (60 MIN)   3:00 PM   (60 min.)    ONC INFUSION NURSE   [facility] 7     8       9     10    RETURN CCSL   1:45 PM   (30 min.)   Jahaira Plunkett MD   [facility] 11     12     13     14     15       16     17     18     19     20     21    SPARC RETURN   1:45 PM   (30 min.)   Boris Taylor MD   [facility] 22       23     24    RETURN CCSL   3:10 PM   (40 min.)   Ayanna Tellez DO   [facility] [place] 25     26    PT BREAST CANCER REHAB EVAL   8:45 AM   (60 min.)   Carolyn Espinoza, PT   [facility] 27     28     29       30                                               July 2024 Sunday Monday Tuesday Wednesday Thursday Friday Saturday        1     2     3     4     5     6       7      8     9     10     11     12     13       14     15     16     17     18     19     20       21     22     23     24     25     26     27       28     29     30     31                                    Lab Results:  Recent Results (from the past 12 hour(s))   Comprehensive metabolic panel    Collection Time: 06/06/24  1:40 PM   Result Value Ref Range    Sodium 143 135 - 145 mmol/L    Potassium 3.5 3.4 - 5.3 mmol/L    Carbon Dioxide (CO2) 26 22 - 29 mmol/L    Anion Gap 12 7 - 15 mmol/L    Urea Nitrogen 10.3 6.0 - 20.0 mg/dL    Creatinine 1.03 (H) 0.51 - 0.95 mg/dL    GFR Estimate 67 >60 mL/min/1.73m2    Calcium 9.3 8.6 - 10.0 mg/dL    Chloride 105 98 - 107 mmol/L    Glucose 131 (H) 70 - 99 mg/dL    Alkaline Phosphatase 94 40 - 150 U/L    AST 18 0 - 45 U/L    ALT 15 0 - 50 U/L    Protein Total 7.3 6.4 - 8.3 g/dL    Albumin 4.0 3.5 - 5.2 g/dL    Bilirubin Total 0.2 <=1.2 mg/dL   CBC with platelets and differential    Collection Time: 06/06/24  1:40 PM   Result Value Ref Range    WBC Count 4.4 4.0 - 11.0 10e3/uL    RBC Count 3.11 (L) 3.80 - 5.20 10e6/uL    Hemoglobin 11.0 (L) 11.7 - 15.7 g/dL    Hematocrit 32.5 (L) 35.0 - 47.0 %     (H) 78 - 100 fL    MCH 35.4 (H) 26.5 - 33.0 pg    MCHC 33.8 31.5 - 36.5 g/dL    RDW 12.2 10.0 - 15.0 %    Platelet Count 149 (L) 150 - 450 10e3/uL    % Neutrophils 59 %    % Lymphocytes 32 %    % Monocytes 6 %    % Eosinophils 1 %    % Basophils 1 %    % Immature Granulocytes 1 %    NRBCs per 100 WBC 0 <1 /100    Absolute Neutrophils 2.6 1.6 - 8.3 10e3/uL    Absolute Lymphocytes 1.4 0.8 - 5.3 10e3/uL    Absolute Monocytes 0.3 0.0 - 1.3 10e3/uL    Absolute Eosinophils 0.1 0.0 - 0.7 10e3/uL    Absolute Basophils 0.1 0.0 - 0.2 10e3/uL    Absolute Immature Granulocytes 0.0 <=0.4 10e3/uL    Absolute NRBCs 0.0 10e3/uL

## 2024-06-06 NOTE — ORAL ONC MGMT
Oral Chemotherapy Monitoring Program  Lab Follow Up    Patient is on abemaciclib (Verzenio)  Reviewed lab results from 6/6/24.    Assessment & Plan:  CBC and CMP reviewed. Results show grade 1 thrombocytopenia, no dose adjustment needed.  Plan to continue Verzenio as prescribed. Patient not contacted with lab results by OC team, as pt was seen in infusion suite    Follow-Up:  6/10: Dr Plunkett visit      Pearl Garcia, PharmD  Hematology/Oncology Clinical Pharmacist  Oral Chemotherapy Monitoring Program  [facility]  [phone]  June 6, 2024            3/5/2024    10:00 AM 3/15/2024     2:00 PM 4/10/2024     1:00 PM 5/8/2024    12:00 PM 5/13/2024    10:00 AM 5/22/2024    11:00 AM 6/6/2024     4:00 PM   ORAL CHEMOTHERAPY   Assessment Type Monthly Follow up Refill Refill Refill Refill Initial Follow up Lab Monitoring   Diagnosis Code Breast Cancer Breast Cancer Breast Cancer Breast Cancer Breast Cancer Breast Cancer Breast Cancer   Providers Dr. Dimitrios Plunkett   [facility]   Is this patient followed by the [facility] OC team? No No No No No No No   Drug Name Verzenio (abemaciclib) Verzenio (abemaciclib) Verzenio (abemaciclib) Verzenio (abemaciclib) Verzenio (abemaciclib) Verzenio (abemaciclib) Verzenio (abemaciclib)   Dose 150 mg 150 mg 150 mg 150 mg 100 mg 100 mg 100 mg   Current Schedule BID BID BID BID BID BID BID   Cycle Details Continuous Continuous Continuous Continuous Continuous Continuous Continuous   Start Date of Last Cycle  2/21/2024        Planned next cycle start date  3/20/2024        Doses missed in last 2 weeks 1     1    Adherence Assessment Adherent     Adherent    Adverse Effects No AE identified during assessment     Fatigue Thrombocytopenia   Fatigue      Grade 2 Grade 1   Pharmacist Intervention(fatigue)      Yes No   Any new drug interactions?        No   Is the dose as ordered appropriate for the patient?       Yes       Labs:  _  Result Component Current Result Ref Range   Sodium 143 (6/6/2024) 135 - 145 mmol/L     _  Result Component Current Result Ref Range   Potassium 3.5 (6/6/2024) 3.4 - 5.3 mmol/L     _  Result Component Current Result Ref Range   Calcium 9.3 (6/6/2024) 8.6 - 10.0 mg/dL     No results found for Mag within last 30 days.     No results found for Phos within last 30 days.     _  Result Component Current Result Ref Range   Albumin 4.0 (6/6/2024) 3.5 - 5.2 g/dL     _  Result Component Current Result Ref Range   Urea Nitrogen 10.3 (6/6/2024) 6.0 - 20.0 mg/dL     _  Result Component Current Result Ref Range   Creatinine 1.03 (H) (6/6/2024) 0.51 - 0.95 mg/dL     _  Result Component Current Result Ref Range   AST 18 (6/6/2024) 0 - 45 U/L     _  Result Component Current Result Ref Range   ALT 15 (6/6/2024) 0 - 50 U/L     _  Result Component Current Result Ref Range   Bilirubin Total 0.2 (6/6/2024) <=1.2 mg/dL     _  Result Component Current Result Ref Range   WBC Count 4.4 (6/6/2024) 4.0 - 11.0 10e3/uL     _  Result Component Current Result Ref Range   Hemoglobin 11.0 (L) (6/6/2024) 11.7 - 15.7 g/dL     _  Result Component Current Result Ref Range   Platelet Count 149 (L) (6/6/2024) 150 - 450 10e3/uL     No results found for ANC within last 30 days.

## 2024-06-06 NOTE — PROGRESS NOTES
Infusion Nursing Note:  Teresa Sanderson presents today for cycle 6 day 1 faslodex and cycle 12 day 1 zometa.    Patient seen by provider today: No   present during visit today: Not Applicable.    Note: Pt comes to infusion today with no questions or concerns. Pt has no pain today and denies any need for intervention at this appointment. Pt has been afebrile and denies signs and symptoms of infection including: cough, SOB, sore throat, diarrhea, vomiting, rash, or pain with urination. Pt wishes to proceed with today's planned treatment.    Intravenous Access:  Peripheral IV placed.    Treatment Conditions:  Lab Results   Component Value Date    HGB 11.0 (L) 06/06/2024    WBC 4.4 06/06/2024    ANEU 1.3 (L) 01/15/2024    ANEUTAUTO 2.6 06/06/2024     (L) 06/06/2024     Lab Results   Component Value Date     06/06/2024    POTASSIUM 3.5 06/06/2024    CR 1.03 (H) 06/06/2024    NANDO 9.3 06/06/2024    BILITOTAL 0.2 06/06/2024    ALBUMIN 4.0 06/06/2024    ALT 15 06/06/2024    AST 18 06/06/2024     Results reviewed, labs MET treatment parameters, ok to proceed with treatment.    Post Infusion Assessment:  Patient tolerated infusion without incident.  Patient tolerated injection without incident.  Blood return noted pre and post infusion.  Site patent and intact, free from redness, edema or discomfort.  No evidence of extravasations.  Access discontinued per protocol.     Discharge Plan:   Prescription refills requested for xarelto.  Discharge instructions reviewed with: Patient.  Patient and/or family verbalized understanding of discharge instructions and all questions answered.  AVS to patient via [employer].  Patient will return 06/10/24 for next appointment.   Patient discharged in stable condition accompanied by: self.  Departure Mode: Ambulatory.      Emily Meese, RN

## 2024-06-06 NOTE — NURSING NOTE
Chief Complaint   Patient presents with    Blood Draw     Labs drawn from PIV placed by VAT. Line flushed with saline. Vitals taken. Pt checked in for appointment(s).      Labs drawn from PIV placed by VAT. Line flushed with saline. Vitals taken. Pt checked in for appointment(s).    Sara Steinberg RN

## 2024-06-07 LAB
CANCER AG15-3 SERPL-ACNC: 65 U/ML
CEA SERPL-MCNC: 5 NG/ML

## 2024-06-07 NOTE — TELEPHONE ENCOUNTER
Completed 6 pages of leave documentation from Joa[employer] for patient's daughter to care for patient. Sent to patient via [email or web address] and faxed to scanning. Hard copy being held in nurse triage until scanning is confirmed.    Detail Level: Simple Render Risk Assessment In Note?: no Additional Notes: LISSA: skin type 4, no tan, medium, medium\\n\\nSettings in attachments. 4/5 done. TAC applied.

## 2024-06-08 RX ORDER — HEPARIN SODIUM,PORCINE 10 UNIT/ML
5 VIAL (ML) INTRAVENOUS
Status: CANCELLED | OUTPATIENT
Start: 2024-07-30

## 2024-06-08 RX ORDER — ZOLEDRONIC ACID 0.04 MG/ML
4 INJECTION, SOLUTION INTRAVENOUS ONCE
Status: CANCELLED | OUTPATIENT
Start: 2024-07-30 | End: 2024-07-30

## 2024-06-08 RX ORDER — HEPARIN SODIUM (PORCINE) LOCK FLUSH IV SOLN 100 UNIT/ML 100 UNIT/ML
5 SOLUTION INTRAVENOUS
Status: CANCELLED | OUTPATIENT
Start: 2024-07-30

## 2024-06-08 NOTE — PROGRESS NOTES
Oncology Visit:   Date on this visit: Zia 10, 2024    Diagnosis:  ER positive left breast cancer metastasized to bones.     Primary Physician: No Ref-Primary, Physician     History Of Present Illness:    Ms. Sanderson is a 48 year old female with a h/o tobacco abuse and DVTs with left breast cancer metastasized to bone. She presented to [facility] ED with back pain on 12/5/2020. MRI of the L-spine showed an abnormal L4 lesion with associated right paraspinal mass, abnormalities in L5 and the left iliac bone were also seen. CT C/A/P showed a left breast mass, lytic lesions of T7, L4, and the pelvis, and a 3 cm lesion in the kidney (thought to be a cyst). Ultrasound of the bilateral lower extremities showed a non-occlusive thrombus in the left popliteal vein. Mammogram and ultrasound of the bilateral breasts on 12/17/2020 showed a spiculated mass measuring at least 7.8 cm at 12-1:00 left breast extending from the nipple to 9 cm from the nipple with associated nipple retraction. This mass was biopsied, and showed IDC with surrounding DCIS, grade 3, ER+ 90%, and CA+ 75%.  HER2 was equivocal in approximately 35% of tumor cells by FISH and was negative by IHC.    Metastatic Breast Cancer Treatment:  12/23/2021 - 1/7/2021  Radiation (3000 cGy) to the lumbar spine.  1/29/2021 - present  Ibrance, zoladex, and anastrozole.  5/17/2021 radiofrequency ablation, kyphoplasty to L4  8/20/2021  Bilateral salpingo-oophorectomies, Ibrance and anastrozole.  She was off anastrozole 12/2021 - 2/2022 and off Ibrance 01/2022 - 02/2022 due to stress and impending homelessness. Off both again 03/2022-04/2022 due to death of her son but restarted in 05/2022.  04/2023  PET/CT with progression in 2 small metastases in the left pelvis.  Palbociclib continued.  Anastrozole changed to exemestane  5/5/2023  Completed radiation, 2000 cGy in 5 fractions, to the left ilium.  12/19/2023 Left breast biopsy  [facility] NGS:   ER positive, 3+  100%   CA  "positive, 1+, 5%   HER2 negative.   AR positive, 1+, 35%   MMR proficient   PD-L1 negative, CPS 0   PTEN positive, 1+ 100%  *Of note, all of the above was IHC, DNA quantity not sufficient for NGS  1/18/24 - present  Fulvestrant + abemaciclib.  Abemaciclib dose reduced to 100 mg PO BID due to hand foot syndrome.    Interval History:   Ms. Sanderson comes into clinic for metastatic breast cancer follow up.  She continues on treatment with Verzenio and fulvestrant.  Teresa is tolerating treatment well.  She reports intermittent episodes of diarrhea.  This is characterized as 1 to 2 days each week where she will have 3-4 loose to watery stools per day.  She states the diarrhea is well-managed with antidiarrheal medications.  She denies loss of appetite or nausea.  She has been able to maintain her weight.  She has had no recent fevers, chills, or symptoms of infection.  She notes a pain in her left flank radiating down to the pelvis which she calls a \"pull\".  She notes sometimes this occurs when she twists a certain way.  It comes and goes.  She continues on pain medication as she has before.  She denies other new bone or joint aches or pains.  She has no current cough, shortness of breath, or chest pain.  She denies headaches or focal neurologic complaints.  She states much of her financial stress is improved as her CADI waiver went through and she now has help with transportation to appointments.    Past Medical/Surgical History:   Past Medical History:   Diagnosis Date    Anxiety     Breast CA (H) 12/2020    Depression     DVT (deep venous thrombosis) (H) 2014    Left breast mass     x approximately 4-5 months    Pulmonary emboli (H)     Pyelonephritis     Schizoaffective disorder (H)     Tobacco use      Past Surgical History:   Procedure Laterality Date    COLONOSCOPY N/A 7/8/2022    Procedure: COLONOSCOPY, WITH POLYPECTOMY;  Surgeon: Ham Cano MD;  [place] [facility] [place]    IR LUMBAR KYPHOPLASTY VERTEBRAE  " 5/17/2021    LAPAROSCOPIC SALPINGO-OOPHORECTOMY Bilateral 8/20/2021    Procedure: BILATERAL SALPINGO-OOPHORECTOMY, LAPAROSCOPIC;  Surgeon: Rory Lopez MD;  Location: [facility] OR    TUBAL LIGATION  1998        Allergies   Allergen Reactions    Contrast Dye      Pt developed nausea after isovue 370 injection on 6/9/21        Current Outpatient Medications   Medication Sig Dispense Refill    [START ON 7/4/2024] abemaciclib (VERZENIO) 100 MG tablet Take 1 tablet (100 mg) by mouth 2 times daily for 28 days 56 tablet 0    abemaciclib (VERZENIO) 100 MG tablet Take 1 tablet (100 mg) by mouth 2 times daily for 28 days 56 tablet 0    buprenorphine (BUTRANS) 10 MCG/HR WK patch Place 1 patch onto the skin every 7 days 4 patch 0    buprenorphine (BUTRANS) 20 MCG/HR WK patch Place 1 patch onto the skin every 7 days 4 patch 2    exemestane (AROMASIN) 25 MG tablet Take 1 tablet (25 mg) by mouth daily (Patient not taking: Reported on 5/9/2024) 90 tablet 3    gabapentin (NEURONTIN) 300 MG capsule Take 2 capsules in the morning, 2 capsules in afternoon, and 3 capsules at bedtime. 210 capsule 1    loperamide (IMODIUM) 2 MG capsule Start with 2 caps (4 mg), then take one cap (2 mg) after each diarrheal stool as needed. Do not use more than 8 caps (16 mg) per day. (Patient not taking: Reported on 5/9/2024) 30 capsule 0    methocarbamol (ROBAXIN) 500 MG tablet Take 2-3 tablets (1,000-1,500 mg) by mouth 3 times daily as needed for muscle spasms 210 tablet 2    methylPREDNISolone (MEDROL) 32 MG tablet Take 1 tablet (32 mg) 12 hours and again 2 hours prior to the procedure with IV contrast (Patient not taking: Reported on 5/9/2024) 2 tablet 0    methylPREDNISolone (MEDROL) 32 MG tablet Take 1 tablet (32 mg) by mouth daily 12 hours prior to the procedure with IV contrast 2 tablet 0    naloxone (NARCAN) 4 MG/0.1ML nasal spray Spray 1 spray (4 mg) into one nostril alternating nostrils once as needed for opioid reversal every 2-3  minutes until assistance arrives (Patient not taking: Reported on 4/8/2024) 0.2 mL 0    ondansetron (ZOFRAN) 8 MG tablet Take 1 tablet (8 mg) by mouth every 8 hours as needed for nausea (Patient not taking: Reported on 2/6/2024) 30 tablet 3    pantoprazole (PROTONIX) 40 MG EC tablet Take 1 tablet (40 mg) by mouth every morning (before breakfast) (Patient not taking: Reported on 3/15/2024) 30 tablet 1    polyethylene glycol (MIRALAX) 17 GM/Dose powder Take 17 g by mouth daily as needed for constipation (Patient not taking: Reported on 5/9/2024) 578 g 3    prochlorperazine (COMPAZINE) 10 MG tablet Take 1 tablet (10 mg) by mouth every 6 hours as needed for nausea or vomiting (Patient not taking: Reported on 4/8/2024) 30 tablet 2    prochlorperazine (COMPAZINE) 10 MG tablet Take 1 tablet (10 mg) by mouth every 6 hours as needed for nausea or vomiting (Patient not taking: Reported on 4/8/2024) 30 tablet 3    QUEtiapine (SEROQUEL) 400 MG tablet Take 1 tablet (400 mg) by mouth at bedtime 30 tablet 2    QUEtiapine (SEROQUEL) 50 MG tablet Take 0.5-1 tablets (25-50 mg) by mouth 2 times daily as needed (for sleep or acute agitation) 60 tablet 2    rivaroxaban ANTICOAGULANT (XARELTO) 20 MG TABS tablet Take 1 tablet (20 mg) by mouth daily (with dinner) 90 tablet 3    SENNA-docusate sodium (SENNA S) 8.6-50 MG tablet Take 2 tablets by mouth 2 times daily as needed (Constipation) (Patient not taking: Reported on 5/9/2024) 100 tablet 3    sertraline (ZOLOFT) 50 MG tablet Take 1 tablet (50 mg) by mouth daily 30 tablet 2    Vitamin D3 (CHOLECALCIFEROL) 25 mcg (1000 units) tablet Take 1 tablet (25 mcg) by mouth daily 90 tablet 3     No current facility-administered medications for this visit.     Facility-Administered Medications Ordered in Other Visits   Medication Dose Route Frequency Provider Last Rate Last Admin    lidocaine 1% with EPINEPHrine 1:100,000 injection 10 mL  10 mL Intradermal Once Sangeetha Vazquez MD        '  Physical Exam:   BP (!) 160/96   Pulse 72   Temp 98.2  F (36.8  C) (Oral)   Resp 16   Wt 107.5 kg (237 lb)   SpO2 99%   BMI 33.05 kg/m    General:  Well appearing adult female in NAD.  Alert and oriented x 3.  HEENT:  Normocephalic.  Sclera anicteric. MMM.    Lymph:  No palpable cervical, supraclavicular, or axillary LAD.   Chest:  CTA bilaterally.  No wheezes or crackles.  CV:  RRR.  No audible m/r/g.  Abd:  Soft/ND/NT.  No palpable hepatosplenomegaly.  Ext:  No edema of the bilateral lower extremities.    Neuro:  Cranial nerves grossly intact. Gait stable.  No tremor or dyskinetic movements.  Psych:  Mood and affect appear normal.    Skin: She has some erythema, xerosis, thickened skin, and calluses on the soles of her feet and to a lesser degree on her hands     Laboratory/Imaging Studies:   I personally reviewed the below laboratories and images while in clinic today:    6/6/2024 Laboratories:  Electrolytes are wnl.  Creatinine is elevated at 1.03 mg/dL  Estimated GFR is reduced at 67 mL/min  Liver function tests are wnl     is elevated at 65 U/mL    WBC is wnl. ANC is wnl at 2.6  Hemoglobin is low at 11 g/dL  Platelets are low at 149K  MCV is elevated at 105 c/w CDK 4/6 inhibitor effect.    6/6/2024 PET/CT:  FINDINGS:   BACKGROUND: Liver SUV max = 3.6, Aorta Blood SUV max = 3.4.      HEAD/NECK:  No abnormal uptake.      Likely degenerative uptake at the left C3-4 articular facet joint.  Stable non-FDG avid low-density lesion in the right thyroid measuring  1.7 cm. Redemonstration of patchy FDG uptake throughout the left  thyroid with a maximum SUV of 5.8.     CHEST:  Spiculated mass in the upper inner quadrant of the left breast  measuring 1.6 x 1.1 cm with an SUV max of 4.5, previously 1.7 x 1.1 cm  with an SUV max of 4.1 (series 603 image 230). Solid mass in the  medial left breast measuring 0.7 x 0.7 cm with an SUV max of 2.5,  previously 0.8 x 0.6 cm with an SUV max of 6 (series 603 image  253).  No new breast masses.     No FDG avid or enlarged thoracic or axillary lymph nodes.     ABDOMEN AND PELVIS:  No abnormal uptake.      Benign right renal cyst requiring no follow-up.     LOWER EXTREMITIES:   No abnormal uptake.      BONES AND SOFT TISSUES:   FDG avid lytic lesion in the posterior left iliac bone measuring 1 cm  with an SUV max of 8.7, previously 0.7 cm with an SUV max of 5.5  (series 603 image 470).   FDG avid focus with faint associated sclerosis in the left acetabulum  with an SUV max of 13.5, previously 11.2 (series 603 image 53).      Slightly decreased uptake in the T7 vertebral body (SUV max 4.5,  previously 5.1).   Stable non-FDG avid sclerosis of the left iliac bone and L4 vertebral  bodies consistent with prior radiation.   Stable L4 vertebroplasty with unchanged cement extravasation into the  IVC.     Subcutaneous stranding lateral to the hips with mild FDG uptake (SUV  max 3.8 on the left and 3.0 on the right), suspect injection related  versus contusion. Low suspicion for metastasis.                                                                      IMPRESSION:      1. Stable to slightly decreased metabolic activity in the left breast  masses compared to 12/11/2023. No significant change in size.     2. Increased metabolic activity in the left iliac and acetabular bone  metastases concerning for osseous disease progression. Slightly  decreased metabolic activity in the T7 vertebral body lesion.     3. Patchy FDG uptake in the thyroid consistent with Hashimoto  thyroiditis.     4. Stable L4 vertebroplasty with cement extravasation into the IVC.    ASSESSMENT/PLAN:   48 year old female with history of DVT and ER positive left breast cancer metastasized to bones.    1.  Metastatic breast cancer: She has been on her most recent treatment with abemaciclib and fulvestrant since 01/2024.      - Last received fulvestrant on 6/6, therefore next is due around 7/4.  - Abemaciclib dose  reduced to 100 mg PO BID secondary to hand foot syndrome.  This is not a common side effect of abemaciclib but there is a reports of a rare cases.  Symptoms improved with the dose reduction.  - We reviewed the images of the PET/CT performed on 6/6 which shows an increase in  hypermetabolic uptake in the left ilium and the left acetabulum.  Discussed these sites of progression are small, nevertheless she is symptomatic of progression at the left ilium site.  I recommend consideration of radiation treatment to the left ilium lesion (adjacent to the SI joint) and left acetabulum.  This will allow her to remain on her current treatment longer.  She is in agreement with this plan.  Radiation referral placed today.  - continue treatment with abemaciclib and fulvestrant.  She has been more compliant with treatment the last couple of months as she now has transportation to the clinic.  - Recommend tissue biopsy vs Guardant 360 liquid NGS at time of progression to help guide next treatment (capivasertib vs everolimus vs elacestrant)    2.  Bone metastases/low back pain with LLE sciatica:  She is s/p XRT to the left ilium as well as the lumbar spine and kyphoplasty of L4--with some extravasation of cement which procedularist is aware of and recommended continued AC indefinitely.   - buprenorphine 20 mcg/hr patch, Robaxin, and gabapentin.  Ongoing follow up with palliative medicine.  - tylenol prn  - Receiving Zometa once every 3 months. Next is due around 7/30/024.  - consider palliative radiation to left ilium/left SI joint as above.    3.  Schizoaffective disorder, bipolar type: No change with last visit.  She is on treatment with Seroquel and Zoloft. Ongoing follow up with psychiatry.     4.  DVT:  No change since last visit.  She continues on Xarelto.  Plan to continue until contraindicated given metastatic disease.    5. Diarrhea: G1. Secondary to abemaciclib.  Imodium prn for 3 loose stools in over 24 hours. She feels  her diarrhea is well managed on this regimen.    6.  HFS: Continue thick emollients.     7.  FELTON:  Secondary to abemaciclib.  Continue to avoid NSAIDs and encouraged good hydration.     Labs and fulvestrant every 4 weeks starting 7/4.  (7/3 or 7/5 is okay due to the holiday).  Labs and Zometa around 7/30/2024.  Visit with Alee in 2 months.  Visit with me in 4 months.

## 2024-06-10 ENCOUNTER — ONCOLOGY VISIT (OUTPATIENT)
Dept: ONCOLOGY | Facility: CLINIC | Age: 49
End: 2024-06-10
Attending: INTERNAL MEDICINE
Payer: MEDICARE

## 2024-06-10 VITALS
DIASTOLIC BLOOD PRESSURE: 96 MMHG | HEART RATE: 72 BPM | RESPIRATION RATE: 16 BRPM | WEIGHT: 237 LBS | SYSTOLIC BLOOD PRESSURE: 160 MMHG | TEMPERATURE: 98.2 F | BODY MASS INDEX: 33.05 KG/M2 | OXYGEN SATURATION: 99 %

## 2024-06-10 DIAGNOSIS — C79.51 CARCINOMA OF LEFT BREAST METASTATIC TO BONE (H): ICD-10-CM

## 2024-06-10 DIAGNOSIS — Z17.0 MALIGNANT NEOPLASM OF OVERLAPPING SITES OF LEFT BREAST IN FEMALE, ESTROGEN RECEPTOR POSITIVE (H): Primary | ICD-10-CM

## 2024-06-10 DIAGNOSIS — C50.812 MALIGNANT NEOPLASM OF OVERLAPPING SITES OF LEFT BREAST IN FEMALE, ESTROGEN RECEPTOR POSITIVE (H): Primary | ICD-10-CM

## 2024-06-10 DIAGNOSIS — C50.912 CARCINOMA OF LEFT BREAST METASTATIC TO BONE (H): ICD-10-CM

## 2024-06-10 DIAGNOSIS — K52.1 CHEMOTHERAPY INDUCED DIARRHEA: ICD-10-CM

## 2024-06-10 DIAGNOSIS — N18.2 CKD (CHRONIC KIDNEY DISEASE) STAGE 2, GFR 60-89 ML/MIN: ICD-10-CM

## 2024-06-10 DIAGNOSIS — T45.1X5A CHEMOTHERAPY INDUCED DIARRHEA: ICD-10-CM

## 2024-06-10 DIAGNOSIS — G89.3 CANCER ASSOCIATED PAIN: ICD-10-CM

## 2024-06-10 PROCEDURE — 99214 OFFICE O/P EST MOD 30 MIN: CPT | Performed by: INTERNAL MEDICINE

## 2024-06-10 PROCEDURE — G0463 HOSPITAL OUTPT CLINIC VISIT: HCPCS | Performed by: INTERNAL MEDICINE

## 2024-06-10 ASSESSMENT — PAIN SCALES - GENERAL: PAINLEVEL: SEVERE PAIN (6)

## 2024-06-10 NOTE — LETTER
6/10/2024      Teresa Sanderson  [address]  [place] [facility] [address]      Dear Colleague,    Thank you for referring your patient, Teresa Sanderson, to the [facility]. Please see a copy of my visit note below.    Oncology Visit:   Date on this visit: Zia 10, 2024    Diagnosis:  ER positive left breast cancer metastasized to bones.     Primary Physician: No Ref-Primary, Physician     History Of Present Illness:    Ms. Sanderson is a 48 year old female with a h/o tobacco abuse and DVTs with left breast cancer metastasized to bone. She presented to [facility] ED with back pain on 12/5/2020. MRI of the L-spine showed an abnormal L4 lesion with associated right paraspinal mass, abnormalities in L5 and the left iliac bone were also seen. CT C/A/P showed a left breast mass, lytic lesions of T7, L4, and the pelvis, and a 3 cm lesion in the kidney (thought to be a cyst). Ultrasound of the bilateral lower extremities showed a non-occlusive thrombus in the left popliteal vein. Mammogram and ultrasound of the bilateral breasts on 12/17/2020 showed a spiculated mass measuring at least 7.8 cm at 12-1:00 left breast extending from the nipple to 9 cm from the nipple with associated nipple retraction. This mass was biopsied, and showed IDC with surrounding DCIS, grade 3, ER+ 90%, and NH+ 75%.  HER2 was equivocal in approximately 35% of tumor cells by FISH and was negative by IHC.    Metastatic Breast Cancer Treatment:  12/23/2021 - 1/7/2021  Radiation (3000 cGy) to the lumbar spine.  1/29/2021 - present  Ibrance, zoladex, and anastrozole.  5/17/2021 radiofrequency ablation, kyphoplasty to L4  8/20/2021  Bilateral salpingo-oophorectomies, Ibrance and anastrozole.  She was off anastrozole 12/2021 - 2/2022 and off Ibrance 01/2022 - 02/2022 due to stress and impending homelessness. Off both again 03/2022-04/2022 due to death of her son but restarted in 05/2022.  04/2023  PET/CT with  "progression in 2 small metastases in the left pelvis.  Palbociclib continued.  Anastrozole changed to exemestane  5/5/2023  Completed radiation, 2000 cGy in 5 fractions, to the left ilium.  12/19/2023 Left breast biopsy  [facility] NGS:   ER positive, 3+  100%   NV positive, 1+, 5%   HER2 negative.   AR positive, 1+, 35%   MMR proficient   PD-L1 negative, CPS 0   PTEN positive, 1+ 100%  *Of note, all of the above was IHC, DNA quantity not sufficient for NGS  1/18/24 - present  Fulvestrant + abemaciclib.  Abemaciclib dose reduced to 100 mg PO BID due to hand foot syndrome.    Interval History:   Ms. Sanderson comes into clinic for metastatic breast cancer follow up.  She continues on treatment with Verzenio and fulvestrant.  Teresa is tolerating treatment well.  She reports intermittent episodes of diarrhea.  This is characterized as 1 to 2 days each week where she will have 3-4 loose to watery stools per day.  She states the diarrhea is well-managed with antidiarrheal medications.  She denies loss of appetite or nausea.  She has been able to maintain her weight.  She has had no recent fevers, chills, or symptoms of infection.  She notes a pain in her left flank radiating down to the pelvis which she calls a \"pull\".  She notes sometimes this occurs when she twists a certain way.  It comes and goes.  She continues on pain medication as she has before.  She denies other new bone or joint aches or pains.  She has no current cough, shortness of breath, or chest pain.  She denies headaches or focal neurologic complaints.  She states much of her financial stress is improved as her CADI waiver went through and she now has help with transportation to appointments.    Past Medical/Surgical History:   Past Medical History:   Diagnosis Date    Anxiety     Breast CA (H) 12/2020    Depression     DVT (deep venous thrombosis) (H) 2014    Left breast mass     x approximately 4-5 months    Pulmonary emboli (H)     Pyelonephritis     " Schizoaffective disorder (H)     Tobacco use      Past Surgical History:   Procedure Laterality Date    COLONOSCOPY N/A 7/8/2022    Procedure: COLONOSCOPY, WITH POLYPECTOMY;  Surgeon: Ham Cano MD;  Location: [facility] OR    IR LUMBAR KYPHOPLASTY VERTEBRAE  5/17/2021    LAPAROSCOPIC SALPINGO-OOPHORECTOMY Bilateral 8/20/2021    Procedure: BILATERAL SALPINGO-OOPHORECTOMY, LAPAROSCOPIC;  Surgeon: Rory Lopez MD;  Location: [facility] OR    TUBAL LIGATION  1998        Allergies   Allergen Reactions    Contrast Dye      Pt developed nausea after isovue 370 injection on 6/9/21        Current Outpatient Medications   Medication Sig Dispense Refill    [START ON 7/4/2024] abemaciclib (VERZENIO) 100 MG tablet Take 1 tablet (100 mg) by mouth 2 times daily for 28 days 56 tablet 0    abemaciclib (VERZENIO) 100 MG tablet Take 1 tablet (100 mg) by mouth 2 times daily for 28 days 56 tablet 0    buprenorphine (BUTRANS) 10 MCG/HR WK patch Place 1 patch onto the skin every 7 days 4 patch 0    buprenorphine (BUTRANS) 20 MCG/HR WK patch Place 1 patch onto the skin every 7 days 4 patch 2    exemestane (AROMASIN) 25 MG tablet Take 1 tablet (25 mg) by mouth daily (Patient not taking: Reported on 5/9/2024) 90 tablet 3    gabapentin (NEURONTIN) 300 MG capsule Take 2 capsules in the morning, 2 capsules in afternoon, and 3 capsules at bedtime. 210 capsule 1    loperamide (IMODIUM) 2 MG capsule Start with 2 caps (4 mg), then take one cap (2 mg) after each diarrheal stool as needed. Do not use more than 8 caps (16 mg) per day. (Patient not taking: Reported on 5/9/2024) 30 capsule 0    methocarbamol (ROBAXIN) 500 MG tablet Take 2-3 tablets (1,000-1,500 mg) by mouth 3 times daily as needed for muscle spasms 210 tablet 2    methylPREDNISolone (MEDROL) 32 MG tablet Take 1 tablet (32 mg) 12 hours and again 2 hours prior to the procedure with IV contrast (Patient not taking: Reported on 5/9/2024) 2 tablet 0    methylPREDNISolone (MEDROL) 32 MG  tablet Take 1 tablet (32 mg) by mouth daily 12 hours prior to the procedure with IV contrast 2 tablet 0    naloxone (NARCAN) 4 MG/0.1ML nasal spray Spray 1 spray (4 mg) into one nostril alternating nostrils once as needed for opioid reversal every 2-3 minutes until assistance arrives (Patient not taking: Reported on 4/8/2024) 0.2 mL 0    ondansetron (ZOFRAN) 8 MG tablet Take 1 tablet (8 mg) by mouth every 8 hours as needed for nausea (Patient not taking: Reported on 2/6/2024) 30 tablet 3    pantoprazole (PROTONIX) 40 MG EC tablet Take 1 tablet (40 mg) by mouth every morning (before breakfast) (Patient not taking: Reported on 3/15/2024) 30 tablet 1    polyethylene glycol (MIRALAX) 17 GM/Dose powder Take 17 g by mouth daily as needed for constipation (Patient not taking: Reported on 5/9/2024) 578 g 3    prochlorperazine (COMPAZINE) 10 MG tablet Take 1 tablet (10 mg) by mouth every 6 hours as needed for nausea or vomiting (Patient not taking: Reported on 4/8/2024) 30 tablet 2    prochlorperazine (COMPAZINE) 10 MG tablet Take 1 tablet (10 mg) by mouth every 6 hours as needed for nausea or vomiting (Patient not taking: Reported on 4/8/2024) 30 tablet 3    QUEtiapine (SEROQUEL) 400 MG tablet Take 1 tablet (400 mg) by mouth at bedtime 30 tablet 2    QUEtiapine (SEROQUEL) 50 MG tablet Take 0.5-1 tablets (25-50 mg) by mouth 2 times daily as needed (for sleep or acute agitation) 60 tablet 2    rivaroxaban ANTICOAGULANT (XARELTO) 20 MG TABS tablet Take 1 tablet (20 mg) by mouth daily (with dinner) 90 tablet 3    SENNA-docusate sodium (SENNA S) 8.6-50 MG tablet Take 2 tablets by mouth 2 times daily as needed (Constipation) (Patient not taking: Reported on 5/9/2024) 100 tablet 3    sertraline (ZOLOFT) 50 MG tablet Take 1 tablet (50 mg) by mouth daily 30 tablet 2    Vitamin D3 (CHOLECALCIFEROL) 25 mcg (1000 units) tablet Take 1 tablet (25 mcg) by mouth daily 90 tablet 3     No current facility-administered medications for this  visit.     Facility-Administered Medications Ordered in Other Visits   Medication Dose Route Frequency Provider Last Rate Last Admin    lidocaine 1% with EPINEPHrine 1:100,000 injection 10 mL  10 mL Intradermal Once Sangeetha Vazquez MD       '  Physical Exam:   BP (!) 160/96   Pulse 72   Temp 98.2  F (36.8  C) (Oral)   Resp 16   Wt 107.5 kg (237 lb)   SpO2 99%   BMI 33.05 kg/m    General:  Well appearing adult female in NAD.  Alert and oriented x 3.  HEENT:  Normocephalic.  Sclera anicteric. MMM.    Lymph:  No palpable cervical, supraclavicular, or axillary LAD.   Chest:  CTA bilaterally.  No wheezes or crackles.  CV:  RRR.  No audible m/r/g.  Abd:  Soft/ND/NT.  No palpable hepatosplenomegaly.  Ext:  No edema of the bilateral lower extremities.    Neuro:  Cranial nerves grossly intact. Gait stable.  No tremor or dyskinetic movements.  Psych:  Mood and affect appear normal.    Skin: She has some erythema, xerosis, thickened skin, and calluses on the soles of her feet and to a lesser degree on her hands     Laboratory/Imaging Studies:   I personally reviewed the below laboratories and images while in clinic today:    6/6/2024 Laboratories:  Electrolytes are wnl.  Creatinine is elevated at 1.03 mg/dL  Estimated GFR is reduced at 67 mL/min  Liver function tests are wnl     is elevated at 65 U/mL    WBC is wnl. ANC is wnl at 2.6  Hemoglobin is low at 11 g/dL  Platelets are low at 149K  MCV is elevated at 105 c/w CDK 4/6 inhibitor effect.    6/6/2024 PET/CT:  FINDINGS:   BACKGROUND: Liver SUV max = 3.6, Aorta Blood SUV max = 3.4.      HEAD/NECK:  No abnormal uptake.      Likely degenerative uptake at the left C3-4 articular facet joint.  Stable non-FDG avid low-density lesion in the right thyroid measuring  1.7 cm. Redemonstration of patchy FDG uptake throughout the left  thyroid with a maximum SUV of 5.8.     CHEST:  Spiculated mass in the upper inner quadrant of the left breast  measuring 1.6 x 1.1 cm  with an SUV max of 4.5, previously 1.7 x 1.1 cm  with an SUV max of 4.1 (series 603 image 230). Solid mass in the  medial left breast measuring 0.7 x 0.7 cm with an SUV max of 2.5,  previously 0.8 x 0.6 cm with an SUV max of 6 (series 603 image 253).  No new breast masses.     No FDG avid or enlarged thoracic or axillary lymph nodes.     ABDOMEN AND PELVIS:  No abnormal uptake.      Benign right renal cyst requiring no follow-up.     LOWER EXTREMITIES:   No abnormal uptake.      BONES AND SOFT TISSUES:   FDG avid lytic lesion in the posterior left iliac bone measuring 1 cm  with an SUV max of 8.7, previously 0.7 cm with an SUV max of 5.5  (series 603 image 470).   FDG avid focus with faint associated sclerosis in the left acetabulum  with an SUV max of 13.5, previously 11.2 (series 603 image 53).      Slightly decreased uptake in the T7 vertebral body (SUV max 4.5,  previously 5.1).   Stable non-FDG avid sclerosis of the left iliac bone and L4 vertebral  bodies consistent with prior radiation.   Stable L4 vertebroplasty with unchanged cement extravasation into the  IVC.     Subcutaneous stranding lateral to the hips with mild FDG uptake (SUV  max 3.8 on the left and 3.0 on the right), suspect injection related  versus contusion. Low suspicion for metastasis.                                                                      IMPRESSION:      1. Stable to slightly decreased metabolic activity in the left breast  masses compared to 12/11/2023. No significant change in size.     2. Increased metabolic activity in the left iliac and acetabular bone  metastases concerning for osseous disease progression. Slightly  decreased metabolic activity in the T7 vertebral body lesion.     3. Patchy FDG uptake in the thyroid consistent with Hashimoto  thyroiditis.     4. Stable L4 vertebroplasty with cement extravasation into the IVC.    ASSESSMENT/PLAN:   48 year old female with history of DVT and ER positive left breast cancer  metastasized to bones.    1.  Metastatic breast cancer: She has been on her most recent treatment with abemaciclib and fulvestrant since 01/2024.      - Last received fulvestrant on 6/6, therefore next is due around 7/4.  - Abemaciclib dose reduced to 100 mg PO BID secondary to hand foot syndrome.  This is not a common side effect of abemaciclib but there is a reports of a rare cases.  Symptoms improved with the dose reduction.  - We reviewed the images of the PET/CT performed on 6/6 which shows an increase in  hypermetabolic uptake in the left ilium and the left acetabulum.  Discussed these sites of progression are small, nevertheless she is symptomatic of progression at the left ilium site.  I recommend consideration of radiation treatment to the left ilium lesion (adjacent to the SI joint) and left acetabulum.  This will allow her to remain on her current treatment longer.  She is in agreement with this plan.  Radiation referral placed today.  - continue treatment with abemaciclib and fulvestrant.  She has been more compliant with treatment the last couple of months as she now has transportation to the clinic.  - Recommend tissue biopsy vs Guardant 360 liquid NGS at time of progression to help guide next treatment (capivasertib vs everolimus vs elacestrant)    2.  Bone metastases/low back pain with LLE sciatica:  She is s/p XRT to the left ilium as well as the lumbar spine and kyphoplasty of L4--with some extravasation of cement which procedularist is aware of and recommended continued AC indefinitely.   - buprenorphine 20 mcg/hr patch, Robaxin, and gabapentin.  Ongoing follow up with palliative medicine.  - tylenol prn  - Receiving Zometa once every 3 months. Next is due around 7/30/024.  - consider palliative radiation to left ilium/left SI joint as above.    3.  Schizoaffective disorder, bipolar type: No change with last visit.  She is on treatment with Seroquel and Zoloft. Ongoing follow up with psychiatry.      4.  DVT:  No change since last visit.  She continues on Xarelto.  Plan to continue until contraindicated given metastatic disease.    5. Diarrhea: G1. Secondary to abemaciclib.  Imodium prn for 3 loose stools in over 24 hours. She feels her diarrhea is well managed on this regimen.    6.  HFS: Continue thick emollients.     7.  FELTON:  Secondary to abemaciclib.  Continue to avoid NSAIDs and encouraged good hydration.     Labs and fulvestrant every 4 weeks starting 7/4.  (7/3 or 7/5 is okay due to the holiday).  Labs and Zometa around 7/30/2024.  Visit with Alee in 2 months.  Visit with me in 4 months.    Sincerely,        Jahaira Plunkett MD

## 2024-06-11 ENCOUNTER — PATIENT OUTREACH (OUTPATIENT)
Dept: ONCOLOGY | Facility: CLINIC | Age: 49
End: 2024-06-11
Payer: MEDICARE

## 2024-06-11 NOTE — PROGRESS NOTES
New Patient Oncology Nurse Navigator Note     Referring provider: Jahaira Plunkett MD      Referring Clinic/Organization: [facility] - Oncology Adult      Referred to (specialty:) Radiation Oncology     Requested provider (if applicable): MILAN Lundberg      Date Referral Received: June 11, 2024     Evaluation for:      Evaluate for radiation to the left iliosacral area, focal disease progression - metastatic breast cancer       C50.912, C79.51 (ICD-10-CM) - Carcinoma of left breast metastatic to bone (H)      Clinical History (per Nurse review of records provided):      istory Of Present Illness:    Ms. Sanderson is a 48 year old female with a h/o tobacco abuse and DVTs with left breast cancer metastasized to bone. She presented to [facility] ED with back pain on 12/5/2020. MRI of the L-spine showed an abnormal L4 lesion with associated right paraspinal mass, abnormalities in L5 and the left iliac bone were also seen. CT C/A/P showed a left breast mass, lytic lesions of T7, L4, and the pelvis, and a 3 cm lesion in the kidney (thought to be a cyst). Ultrasound of the bilateral lower extremities showed a non-occlusive thrombus in the left popliteal vein. Mammogram and ultrasound of the bilateral breasts on 12/17/2020 showed a spiculated mass measuring at least 7.8 cm at 12-1:00 left breast extending from the nipple to 9 cm from the nipple with associated nipple retraction. This mass was biopsied, and showed IDC with surrounding DCIS, grade 3, ER+ 90%, and IN+ 75%.  HER2 was equivocal in approximately 35% of tumor cells by FISH and was negative by IHC.     Metastatic Breast Cancer Treatment:  12/23/2021 - 1/7/2021  Radiation (3000 cGy) to the lumbar spine.  1/29/2021 - present  Ibrance, zoladex, and anastrozole.  5/17/2021 radiofrequency ablation, kyphoplasty to L4  8/20/2021  Bilateral salpingo-oophorectomies, Ibrance and anastrozole.  She was off anastrozole  12/2021 - 2/2022 and off Ibrance 01/2022 - 02/2022 due to stress and impending homelessness. Off both again 03/2022-04/2022 due to death of her son but restarted in 05/2022.  04/2023  PET/CT with progression in 2 small metastases in the left pelvis.  Palbociclib continued.  Anastrozole changed to exemestane  5/5/2023  Completed radiation, 2000 cGy in 5 fractions, to the left ilium.  12/19/2023 Left breast biopsy  [facility] NGS:              ER positive, 3+  100%              TX positive, 1+, 5%              HER2 negative.              AR positive, 1+, 35%              MMR proficient              PD-L1 negative, CPS 0              PTEN positive, 1+ 100%  *Of note, all of the above was IHC, DNA quantity not sufficient for NGS  1/18/24 - present  Fulvestrant + abemaciclib.  Abemaciclib dose reduced to 100 mg PO BID due to hand foot syndrome.    Laboratory/Imaging Studies:      6/6/2024 Laboratories:  Electrolytes are wnl.  Creatinine is elevated at 1.03 mg/dL  Estimated GFR is reduced at 67 mL/min  Liver function tests are wnl      is elevated at 65 U/mL     WBC is wnl. ANC is wnl at 2.6  Hemoglobin is low at 11 g/dL  Platelets are low at 149K  MCV is elevated at 105 c/w CDK 4/6 inhibitor effect.     6/6/2024 PET/CT:  FINDINGS:   BACKGROUND: Liver SUV max = 3.6, Aorta Blood SUV max = 3.4.      HEAD/NECK:  No abnormal uptake.      Likely degenerative uptake at the left C3-4 articular facet joint.  Stable non-FDG avid low-density lesion in the right thyroid measuring  1.7 cm. Redemonstration of patchy FDG uptake throughout the left  thyroid with a maximum SUV of 5.8.     CHEST:  Spiculated mass in the upper inner quadrant of the left breast  measuring 1.6 x 1.1 cm with an SUV max of 4.5, previously 1.7 x 1.1 cm  with an SUV max of 4.1 (series 603 image 230). Solid mass in the  medial left breast measuring 0.7 x 0.7 cm with an SUV max of 2.5,  previously 0.8 x 0.6 cm with an SUV max of 6 (series 603 image 253).  No new  breast masses.     No FDG avid or enlarged thoracic or axillary lymph nodes.     ABDOMEN AND PELVIS:  No abnormal uptake.      Benign right renal cyst requiring no follow-up.     LOWER EXTREMITIES:   No abnormal uptake.      BONES AND SOFT TISSUES:   FDG avid lytic lesion in the posterior left iliac bone measuring 1 cm  with an SUV max of 8.7, previously 0.7 cm with an SUV max of 5.5  (series 603 image 470).   FDG avid focus with faint associated sclerosis in the left acetabulum  with an SUV max of 13.5, previously 11.2 (series 603 image 53).      Slightly decreased uptake in the T7 vertebral body (SUV max 4.5,  previously 5.1).   Stable non-FDG avid sclerosis of the left iliac bone and L4 vertebral  bodies consistent with prior radiation.   Stable L4 vertebroplasty with unchanged cement extravasation into the  IVC.     Subcutaneous stranding lateral to the hips with mild FDG uptake (SUV  max 3.8 on the left and 3.0 on the right), suspect injection related  versus contusion. Low suspicion for metastasis.                                                                      IMPRESSION:      1. Stable to slightly decreased metabolic activity in the left breast  masses compared to 12/11/2023. No significant change in size.     2. Increased metabolic activity in the left iliac and acetabular bone  metastases concerning for osseous disease progression. Slightly  decreased metabolic activity in the T7 vertebral body lesion.     3. Patchy FDG uptake in the thyroid consistent with Hashimoto  thyroiditis.     4. Stable L4 vertebroplasty with cement extravasation into the IVC.     Records Location: See Bookmarked material     Records Needed: NA   Additional testing needed prior to consult: NA    Payor: MEDICARE / Plan: MEDICARE / Product Type: Medicare /     June 11, 2024  Referral received and reviewed.  Sent to NPS to process.    Silvia GERMANN, RN   Oncology Nurse Navigator   [facility]    [phone] / [phone]

## 2024-06-14 ENCOUNTER — OFFICE VISIT (OUTPATIENT)
Dept: RADIATION ONCOLOGY | Facility: CLINIC | Age: 49
End: 2024-06-14
Attending: INTERNAL MEDICINE
Payer: MEDICARE

## 2024-06-14 VITALS
HEART RATE: 74 BPM | WEIGHT: 237 LBS | SYSTOLIC BLOOD PRESSURE: 153 MMHG | BODY MASS INDEX: 33.05 KG/M2 | DIASTOLIC BLOOD PRESSURE: 102 MMHG

## 2024-06-14 DIAGNOSIS — C50.912 CARCINOMA OF LEFT BREAST METASTATIC TO BONE (H): ICD-10-CM

## 2024-06-14 DIAGNOSIS — C79.51 CARCINOMA OF LEFT BREAST METASTATIC TO BONE (H): ICD-10-CM

## 2024-06-14 PROCEDURE — 99214 OFFICE O/P EST MOD 30 MIN: CPT | Performed by: RADIOLOGY

## 2024-06-14 PROCEDURE — G0463 HOSPITAL OUTPT CLINIC VISIT: HCPCS | Performed by: RADIOLOGY

## 2024-06-14 ASSESSMENT — ENCOUNTER SYMPTOMS
BACK PAIN: 1
CONSTITUTIONAL NEGATIVE: 1
EYES NEGATIVE: 1
RESPIRATORY NEGATIVE: 1
NEUROLOGICAL NEGATIVE: 1
DEPRESSION: 1
CARDIOVASCULAR NEGATIVE: 1
NAUSEA: 1
NERVOUS/ANXIOUS: 1

## 2024-06-14 NOTE — PROGRESS NOTES
HPI    INITIAL PATIENT ASSESSMENT    Diagnosis: Cancer    Prior radiation therapy:Metastatic Breast Cancer: Lumbar spine 3000 cGy completed 01/07/21,   Lt illiac 2000 cGy completed 5/5/23    Prior chemotherapy: Verzenio    Prior hormonal therapy:No    Pain Eval:  Current history of pain associated with this visit:   Intensity: 7/10  Current: aching  Location: Back  Treatment: [place]    Psychosocial  Living arrangements: Lives with family  Fall Risk: independent   referral needs: Not needed    Advanced Directive: No  Implantable Cardiac Device? No    Review of Systems   Constitutional: Negative.    HENT: Negative.     Eyes: Negative.    Respiratory: Negative.     Cardiovascular: Negative.    Gastrointestinal:  Positive for nausea.   Genitourinary: Negative.    Musculoskeletal:  Positive for back pain.        Muscle spasms   Skin: Negative.    Neurological: Negative.    Endo/Heme/Allergies: Negative.    Psychiatric/Behavioral:  Positive for depression. The patient is nervous/anxious.

## 2024-06-14 NOTE — PROGRESS NOTES
Radiation Oncology Consultation:  Date on this visit: 6/14/2024    CC: Bone metastases secondary to primary breast cancer    History Of Present Illness:  Ms. Sanderson is a 48 year old female who was diagnosed with breast cancer metastatic to the bone after presenting with back pain in December 2020.  Initial sites of disease included a 7.8 cm left breast primary, T7, L4, L5, and the bony pelvis.  Biopsy of the left breast mass showed grade 3 invasive ductal carcinoma that was ER positive, DE positive, and HER2 FISH negative.  She also had a nonocclusive thrombus in the left popliteal vein found on ultrasound around the time of diagnosis.    Her treatment history is as follows:  12/23/2021 - 1/7/2021: Radiotherapy (30Gy in 10 fractions) to the lumbar spine (L3-L5) for pain and mild sensory loss of lower extemities  1/29/2021: Ibrance, zoladex, and anastrozole  5/17/2021: Radiofrequency ablation, kyphoplasty to L4  8/20/2021: Bilateral salpingo-oophorectomies, Ibrance and anastrozole    4/2023:  PET/CT with progression in 2 small metastases in the left pelvis.  Palbociclib continued.  Anastrozole changed to exemestane.  5/1/2023 - 5/2023: 20Gy in 5 fractions to the left ilium  1/18/24 - present:  Fulvestrant + abemaciclib.    Her most recent PET/CT on 6/10/24 showed stable to decreased disease in her left breast, T7, and L4.  However, there was a slight increase in size and uptake in the posterior left iliac bone (7 mm increased to 10 mm with an increase in SUV from 5.5 to 8.7), and an increase in SUV from 11.2-13.5 in the left acetabulum).    Ms. Sanderson currently reports 7/10 pain in the region of her left SI joint. She states the the pain never goes away and has been present 1-2 months, but does get better with pain medications. She also reports left posterior chest wall/flank pain that radiates down towards her low back, as well as sporadic left groin pain. Left leg muscle spasms and generalized weakness in the  left leg has been present x 1 year.    Past Medical/Surgical History:  Past Medical History:   Diagnosis Date    Anxiety     Breast CA (H) 12/2020    Depression     DVT (deep venous thrombosis) (H) 2014    Left breast mass     x approximately 4-5 months    Pulmonary emboli (H)     Pyelonephritis     Schizoaffective disorder (H)     Tobacco use      Past Surgical History:   Procedure Laterality Date    COLONOSCOPY N/A 7/8/2022    Procedure: COLONOSCOPY, WITH POLYPECTOMY;  Surgeon: Ham Cano MD;  Location: [facility] OR    IR LUMBAR KYPHOPLASTY VERTEBRAE  5/17/2021    LAPAROSCOPIC SALPINGO-OOPHORECTOMY Bilateral 8/20/2021    Procedure: BILATERAL SALPINGO-OOPHORECTOMY, LAPAROSCOPIC;  Surgeon: Rory Lopez MD;  Location: [facility] OR    TUBAL LIGATION  1998       Past Radiation History: see HPI  Past Chemotherapy History: see HPI  Implanted Cardiac device:   none    Review of Systems:  Reviewed.  See details in nursing note    Allergies:  Allergies as of 06/14/2024 - Reviewed 06/14/2024   Allergen Reaction Noted    Contrast dye  06/09/2021       Current Medications:  Current Outpatient Medications   Medication Sig Dispense Refill    [START ON 7/4/2024] abemaciclib (VERZENIO) 100 MG tablet Take 1 tablet (100 mg) by mouth 2 times daily for 28 days 56 tablet 0    abemaciclib (VERZENIO) 100 MG tablet Take 1 tablet (100 mg) by mouth 2 times daily for 28 days 56 tablet 0    buprenorphine (BUTRANS) 10 MCG/HR WK patch Place 1 patch onto the skin every 7 days 4 patch 0    buprenorphine (BUTRANS) 20 MCG/HR WK patch Place 1 patch onto the skin every 7 days 4 patch 2    exemestane (AROMASIN) 25 MG tablet Take 1 tablet (25 mg) by mouth daily (Patient not taking: Reported on 5/9/2024) 90 tablet 3    gabapentin (NEURONTIN) 300 MG capsule Take 2 capsules in the morning, 2 capsules in afternoon, and 3 capsules at bedtime. 210 capsule 1    loperamide (IMODIUM) 2 MG capsule Start with 2 caps (4 mg), then take one cap (2 mg) after  each diarrheal stool as needed. Do not use more than 8 caps (16 mg) per day. (Patient not taking: Reported on 5/9/2024) 30 capsule 0    methocarbamol (ROBAXIN) 500 MG tablet Take 2-3 tablets (1,000-1,500 mg) by mouth 3 times daily as needed for muscle spasms 210 tablet 2    methylPREDNISolone (MEDROL) 32 MG tablet Take 1 tablet (32 mg) 12 hours and again 2 hours prior to the procedure with IV contrast (Patient not taking: Reported on 5/9/2024) 2 tablet 0    methylPREDNISolone (MEDROL) 32 MG tablet Take 1 tablet (32 mg) by mouth daily 12 hours prior to the procedure with IV contrast 2 tablet 0    naloxone (NARCAN) 4 MG/0.1ML nasal spray Spray 1 spray (4 mg) into one nostril alternating nostrils once as needed for opioid reversal every 2-3 minutes until assistance arrives (Patient not taking: Reported on 4/8/2024) 0.2 mL 0    ondansetron (ZOFRAN) 8 MG tablet Take 1 tablet (8 mg) by mouth every 8 hours as needed for nausea (Patient not taking: Reported on 2/6/2024) 30 tablet 3    pantoprazole (PROTONIX) 40 MG EC tablet Take 1 tablet (40 mg) by mouth every morning (before breakfast) (Patient not taking: Reported on 3/15/2024) 30 tablet 1    polyethylene glycol (MIRALAX) 17 GM/Dose powder Take 17 g by mouth daily as needed for constipation (Patient not taking: Reported on 5/9/2024) 578 g 3    prochlorperazine (COMPAZINE) 10 MG tablet Take 1 tablet (10 mg) by mouth every 6 hours as needed for nausea or vomiting (Patient not taking: Reported on 4/8/2024) 30 tablet 2    prochlorperazine (COMPAZINE) 10 MG tablet Take 1 tablet (10 mg) by mouth every 6 hours as needed for nausea or vomiting (Patient not taking: Reported on 4/8/2024) 30 tablet 3    QUEtiapine (SEROQUEL) 400 MG tablet Take 1 tablet (400 mg) by mouth at bedtime 30 tablet 2    QUEtiapine (SEROQUEL) 50 MG tablet Take 0.5-1 tablets (25-50 mg) by mouth 2 times daily as needed (for sleep or acute agitation) 60 tablet 2    rivaroxaban ANTICOAGULANT (XARELTO) 20 MG TABS  tablet Take 1 tablet (20 mg) by mouth daily (with dinner) 90 tablet 3    SENNA-docusate sodium (SENNA S) 8.6-50 MG tablet Take 2 tablets by mouth 2 times daily as needed (Constipation) (Patient not taking: Reported on 5/9/2024) 100 tablet 3    sertraline (ZOLOFT) 50 MG tablet Take 1 tablet (50 mg) by mouth daily 30 tablet 2    Vitamin D3 (CHOLECALCIFEROL) 25 mcg (1000 units) tablet Take 1 tablet (25 mcg) by mouth daily 90 tablet 3        Family History:  Family History   Problem Relation Age of Onset    Lung Cancer Mother     Lung Cancer Father     Lupus Brother     Kidney Disease Brother     Diabetes Daughter     Asthma Son     Cardiovascular Maternal Aunt     Hypertension Other     Diabetes Other     Deep Vein Thrombosis (DVT) No family hx of        Social History:  Social History     Tobacco Use    Smoking status: Some Days     Current packs/day: 0.25     Average packs/day: 0.3 packs/day for 13.1 years (3.3 ttl pk-yrs)     Types: Cigarettes     Start date: 5/13/2011     Passive exposure: Current    Smokeless tobacco: Never   Vaping Use    Vaping status: Never Used   Substance Use Topics    Alcohol use: Not Currently     Comment: occ    Drug use: Yes     Types: Marijuana     Comment: occ       Physical Exam:  BP (!) 153/102   Pulse 74   Wt 107.5 kg (237 lb)   BMI 33.05 kg/m      GENERAL APPEARANCE: healthy, alert and no distress  EYES: EOMS intact. Sclerae anicteric.   NECK: Supple. No asymmetry or masses  RESP: Lungs clear to auscultation  CARDIOVASCULAR: Regular rate and rhythm  ABDOMEN: Soft and nontender. No apparent masses  MUSCULOSKELETAL: Extremities normal. FROM in all extremities. No edema b/l LE  SKIN: No suspicious lesions or rashes  NEURO: CN II-XII intact, light touch symmetric and intact x 4 extremities, strength 5/5 x4 extremities, gait normal  PSYCHIATRIC: Mentation appears normal and affect normal    Laboratory/Imaging Studies  No results found for any visits on 06/14/24.    ASSESSMENT:  48  year old female diagnosed with metastatic breast cancer in 2020. She has undergone two prior courses of radiotherapy to L3-L5 and left iliac bone. Her most recent PET/CT shows stable disease with the exception of slight disease progression along the left SI joint and left acetabulum, which appear to be previously irradiated. She notes pain these areas x 1-2 months.    RECOMMENDATION:    I offered Ms. Sanderson a course of palliative radiotherapy to her left SI joint and acetabulum.    The risks and benefits of radiotherapy were discussed with the patient.  Potential acute and long term side effects were explained.  I especially stressed that the risk of long-term side effect is increased due to the fact the area has previously radiotherapy. I am planning for 30Gy in 15 fractions to mitigate this risk as much as possible. Nevertheless, potential long-term side effects from re-irradiation include, but are not limited: nerve damage, bowel perforation, bowel obstruction, bone necrosis, and soft tissue necrosis. After our discussion, the patient had not questions or concerns and wished to to proceed with radiation therapy as described.  Written consent was obtained.    Thank you for allowing me to participate in Teresa Sanderson 's care .  Please do not hesitate to call me with questions.    Mike Murillo MD  Dept of Radiation Oncology  [facility]    CC  Primary Physician: No Ref-Primary, Physician   Patient Care Team:  No Ref-Primary, Physician as PCP - General  Jahaira Plunkett MD as MD (Hematology & Oncology)  Viry Mccoy PA-C as Referring Physician (Hematology & Oncology)  Carolyn Johnson MD as MD (Psychiatry)  Lianne Tuttle RPH as Pharmacist (Pharmacist)  Maury Drake PsyD as Psychologist (PSYCHOLOGIST CLINICAL)  Maury Drake PsyD as Psychologist (PSYCHOLOGIST CLINICAL)  Lianne Tuttle RPH as Assigned [facility] Pharmacist  Sita Maguire, PhD LP  as Psychologist (Licensed Mental Health)  Alee De Los Santos PA-C as Assigned Cancer Care Provider  Boris Taylor MD as Resident (Student in organized health care education/training program)  Ayanna Tellez DO as Assigned Palliative Care Provider  Yolie Cunha, RN as Specialty Care Coordinator (Hematology & Oncology)  Mike Murillo MD as Physician (Radiation Oncology)  CHARY BALDWIN

## 2024-06-14 NOTE — LETTER
6/14/2024      Teresa Sanderson  [address]  [place] [address]      Dear Colleague,    Thank you for referring your patient, Teresa Sanderson, to the [facility] RADIATION ONCOLOGY. Please see a copy of my visit note below.    Radiation Oncology Consultation:  Date on this visit: 6/14/2024    CC: Bone metastases secondary to primary breast cancer    History Of Present Illness:  Ms. Sanderson is a 48 year old female who was diagnosed with breast cancer metastatic to the bone after presenting with back pain in December 2020.  Initial sites of disease included a 7.8 cm left breast primary, T7, L4, L5, and the bony pelvis.  Biopsy of the left breast mass showed grade 3 invasive ductal carcinoma that was ER positive, MT positive, and HER2 FISH negative.  She also had a nonocclusive thrombus in the left popliteal vein found on ultrasound around the time of diagnosis.    Her treatment history is as follows:  12/23/2021 - 1/7/2021: Radiotherapy (30Gy in 10 fractions) to the lumbar spine (L3-L5) for pain and mild sensory loss of lower extemities  1/29/2021: Ibrance, zoladex, and anastrozole  5/17/2021: Radiofrequency ablation, kyphoplasty to L4  8/20/2021: Bilateral salpingo-oophorectomies, Ibrance and anastrozole    4/2023:  PET/CT with progression in 2 small metastases in the left pelvis.  Palbociclib continued.  Anastrozole changed to exemestane.  5/1/2023 - 5/2023: 20Gy in 5 fractions to the left ilium  1/18/24 - present:  Fulvestrant + abemaciclib.    Her most recent PET/CT on 6/10/24 showed stable to decreased disease in her left breast, T7, and L4.  However, there was a slight increase in size and uptake in the posterior left iliac bone (7 mm increased to 10 mm with an increase in SUV from 5.5 to 8.7), and an increase in SUV from 11.2-13.5 in the left acetabulum).    Ms. Sanderson currently reports 7/10 pain in the region of her left SI joint. She states the the pain never goes away  and has been present 1-2 months, but does get better with pain medications. She also reports left posterior chest wall/flank pain that radiates down towards her low back, as well as sporadic left groin pain. Left leg muscle spasms and generalized weakness in the left leg has been present x 1 year.    Past Medical/Surgical History:  Past Medical History:   Diagnosis Date     Anxiety      Breast CA (H) 12/2020     Depression      DVT (deep venous thrombosis) (H) 2014     Left breast mass     x approximately 4-5 months     Pulmonary emboli (H)      Pyelonephritis      Schizoaffective disorder (H)      Tobacco use      Past Surgical History:   Procedure Laterality Date     COLONOSCOPY N/A 7/8/2022    Procedure: COLONOSCOPY, WITH POLYPECTOMY;  Surgeon: Ham Cano MD;  Location: [facility] OR     IR LUMBAR KYPHOPLASTY VERTEBRAE  5/17/2021     LAPAROSCOPIC SALPINGO-OOPHORECTOMY Bilateral 8/20/2021    Procedure: BILATERAL SALPINGO-OOPHORECTOMY, LAPAROSCOPIC;  Surgeon: Rory Lopez MD;  Location: [facility] OR     TUBAL LIGATION  1998       Past Radiation History: see HPI  Past Chemotherapy History: see HPI  Implanted Cardiac device:   none    Review of Systems:  Reviewed.  See details in nursing note    Allergies:  Allergies as of 06/14/2024 - Reviewed 06/14/2024   Allergen Reaction Noted     Contrast dye  06/09/2021       Current Medications:  Current Outpatient Medications   Medication Sig Dispense Refill     [START ON 7/4/2024] abemaciclib (VERZENIO) 100 MG tablet Take 1 tablet (100 mg) by mouth 2 times daily for 28 days 56 tablet 0     abemaciclib (VERZENIO) 100 MG tablet Take 1 tablet (100 mg) by mouth 2 times daily for 28 days 56 tablet 0     buprenorphine (BUTRANS) 10 MCG/HR WK patch Place 1 patch onto the skin every 7 days 4 patch 0     buprenorphine (BUTRANS) 20 MCG/HR WK patch Place 1 patch onto the skin every 7 days 4 patch 2     exemestane (AROMASIN) 25 MG tablet Take 1 tablet (25 mg) by mouth daily  (Patient not taking: Reported on 5/9/2024) 90 tablet 3     gabapentin (NEURONTIN) 300 MG capsule Take 2 capsules in the morning, 2 capsules in afternoon, and 3 capsules at bedtime. 210 capsule 1     loperamide (IMODIUM) 2 MG capsule Start with 2 caps (4 mg), then take one cap (2 mg) after each diarrheal stool as needed. Do not use more than 8 caps (16 mg) per day. (Patient not taking: Reported on 5/9/2024) 30 capsule 0     methocarbamol (ROBAXIN) 500 MG tablet Take 2-3 tablets (1,000-1,500 mg) by mouth 3 times daily as needed for muscle spasms 210 tablet 2     methylPREDNISolone (MEDROL) 32 MG tablet Take 1 tablet (32 mg) 12 hours and again 2 hours prior to the procedure with IV contrast (Patient not taking: Reported on 5/9/2024) 2 tablet 0     methylPREDNISolone (MEDROL) 32 MG tablet Take 1 tablet (32 mg) by mouth daily 12 hours prior to the procedure with IV contrast 2 tablet 0     naloxone (NARCAN) 4 MG/0.1ML nasal spray Spray 1 spray (4 mg) into one nostril alternating nostrils once as needed for opioid reversal every 2-3 minutes until assistance arrives (Patient not taking: Reported on 4/8/2024) 0.2 mL 0     ondansetron (ZOFRAN) 8 MG tablet Take 1 tablet (8 mg) by mouth every 8 hours as needed for nausea (Patient not taking: Reported on 2/6/2024) 30 tablet 3     pantoprazole (PROTONIX) 40 MG EC tablet Take 1 tablet (40 mg) by mouth every morning (before breakfast) (Patient not taking: Reported on 3/15/2024) 30 tablet 1     polyethylene glycol (MIRALAX) 17 GM/Dose powder Take 17 g by mouth daily as needed for constipation (Patient not taking: Reported on 5/9/2024) 578 g 3     prochlorperazine (COMPAZINE) 10 MG tablet Take 1 tablet (10 mg) by mouth every 6 hours as needed for nausea or vomiting (Patient not taking: Reported on 4/8/2024) 30 tablet 2     prochlorperazine (COMPAZINE) 10 MG tablet Take 1 tablet (10 mg) by mouth every 6 hours as needed for nausea or vomiting (Patient not taking: Reported on 4/8/2024)  30 tablet 3     QUEtiapine (SEROQUEL) 400 MG tablet Take 1 tablet (400 mg) by mouth at bedtime 30 tablet 2     QUEtiapine (SEROQUEL) 50 MG tablet Take 0.5-1 tablets (25-50 mg) by mouth 2 times daily as needed (for sleep or acute agitation) 60 tablet 2     rivaroxaban ANTICOAGULANT (XARELTO) 20 MG TABS tablet Take 1 tablet (20 mg) by mouth daily (with dinner) 90 tablet 3     SENNA-docusate sodium (SENNA S) 8.6-50 MG tablet Take 2 tablets by mouth 2 times daily as needed (Constipation) (Patient not taking: Reported on 5/9/2024) 100 tablet 3     sertraline (ZOLOFT) 50 MG tablet Take 1 tablet (50 mg) by mouth daily 30 tablet 2     Vitamin D3 (CHOLECALCIFEROL) 25 mcg (1000 units) tablet Take 1 tablet (25 mcg) by mouth daily 90 tablet 3        Family History:  Family History   Problem Relation Age of Onset     Lung Cancer Mother      Lung Cancer Father      Lupus Brother      Kidney Disease Brother      Diabetes Daughter      Asthma Son      Cardiovascular Maternal Aunt      Hypertension Other      Diabetes Other      Deep Vein Thrombosis (DVT) No family hx of        Social History:  Social History     Tobacco Use     Smoking status: Some Days     Current packs/day: 0.25     Average packs/day: 0.3 packs/day for 13.1 years (3.3 ttl pk-yrs)     Types: Cigarettes     Start date: 5/13/2011     Passive exposure: Current     Smokeless tobacco: Never   Vaping Use     Vaping status: Never Used   Substance Use Topics     Alcohol use: Not Currently     Comment: occ     Drug use: Yes     Types: Marijuana     Comment: occ       Physical Exam:  BP (!) 153/102   Pulse 74   Wt 107.5 kg (237 lb)   BMI 33.05 kg/m      GENERAL APPEARANCE: healthy, alert and no distress  EYES: EOMS intact. Sclerae anicteric.   NECK: Supple. No asymmetry or masses  RESP: Lungs clear to auscultation  CARDIOVASCULAR: Regular rate and rhythm  ABDOMEN: Soft and nontender. No apparent masses  MUSCULOSKELETAL: Extremities normal. FROM in all extremities. No  edema b/l LE  SKIN: No suspicious lesions or rashes  NEURO: CN II-XII intact, light touch symmetric and intact x 4 extremities, strength 5/5 x4 extremities, gait normal  PSYCHIATRIC: Mentation appears normal and affect normal    Laboratory/Imaging Studies  No results found for any visits on 06/14/24.    ASSESSMENT:  48 year old female diagnosed with metastatic breast cancer in 2020. She has undergone two prior courses of radiotherapy to L3-L5 and left iliac bone. Her most recent PET/CT shows stable disease with the exception of slight disease progression along the left SI joint and left acetabulum, which appear to be previously irradiated. She notes pain these areas x 1-2 months.    RECOMMENDATION:    I offered Ms. Sanderson a course of palliative radiotherapy to her left SI joint and acetabulum.    The risks and benefits of radiotherapy were discussed with the patient.  Potential acute and long term side effects were explained.  I especially stressed that the risk of long-term side effect is increased due to the fact the area has previously radiotherapy. I am planning for 30Gy in 15 fractions to mitigate this risk as much as possible. Nevertheless, potential long-term side effects from re-irradiation include, but are not limited: nerve damage, bowel perforation, bowel obstruction, bone necrosis, and soft tissue necrosis. After our discussion, the patient had not questions or concerns and wished to to proceed with radiation therapy as described.  Written consent was obtained.    Thank you for allowing me to participate in Teresa Sanderson 's care .  Please do not hesitate to call me with questions.    Mike Murillo MD  Dept of Radiation Oncology  [facility]    CC  Primary Physician: No Ref-Primary, Physician   Patient Care Team:  No Ref-Primary, Physician as PCP - General  Jahaira Plunkett MD as MD (Hematology & Oncology)  Viry Mccoy PA-C as Referring Physician (Hematology &  Oncology)  Carolyn Johnson MD as MD (Psychiatry)  Lianne Tuttle RPH as Pharmacist (Pharmacist)  Maruy Drake PsyD as Psychologist (PSYCHOLOGIST CLINICAL)  Maury Drake PsyD as Psychologist (PSYCHOLOGIST CLINICAL)  Lianne Tuttle RPH as Assigned [facility] Pharmacist  Sita Maguire, PhD LP as Psychologist (Licensed Mental Health)  Alee De Los Santos PA-C as Assigned Cancer Care Provider  Boris Taylor MD as Resident (Student in organized health care education/training program)  Ayanna Tellez DO as Assigned Palliative Care Provider  Yolie Cunha, RN as Specialty Care Coordinator (Hematology & Oncology)  Mike Murillo MD as Physician (Radiation Oncology)  CHARY BALDWIN                     [facility]    INITIAL PATIENT ASSESSMENT    Diagnosis: Cancer    Prior radiation therapy:Metastatic Breast Cancer: Lumbar spine 3000 cGy completed 01/07/21,   Lt illiac 2000 cGy completed 5/5/23    Prior chemotherapy: Verzenio    Prior hormonal therapy:No    Pain Eval:  Current history of pain associated with this visit:   Intensity: 7/10  Current: aching  Location: Back  Treatment: Butrans    Psychosocial  Living arrangements: Lives with family  Fall Risk: independent   referral needs: Not needed    Advanced Directive: No  Implantable Cardiac Device? No    Review of Systems   Constitutional: Negative.    HENT: Negative.     Eyes: Negative.    Respiratory: Negative.     Cardiovascular: Negative.    Gastrointestinal:  Positive for nausea.   Genitourinary: Negative.    Musculoskeletal:  Positive for back pain.        Muscle spasms   Skin: Negative.    Neurological: Negative.    Endo/Heme/Allergies: Negative.    Psychiatric/Behavioral:  Positive for depression. The patient is nervous/anxious.                  Again, thank you for allowing me to participate in the care of your patient.        Sincerely,        Mike Murillo MD

## 2024-06-20 ENCOUNTER — APPOINTMENT (OUTPATIENT)
Dept: RADIATION ONCOLOGY | Facility: CLINIC | Age: 49
End: 2024-06-20
Attending: STUDENT IN AN ORGANIZED HEALTH CARE EDUCATION/TRAINING PROGRAM
Payer: MEDICARE

## 2024-06-20 PROCEDURE — 77334 RADIATION TREATMENT AID(S): CPT | Mod: 26 | Performed by: RADIOLOGY

## 2024-06-20 PROCEDURE — 77334 RADIATION TREATMENT AID(S): CPT | Performed by: RADIOLOGY

## 2024-06-21 ENCOUNTER — VIRTUAL VISIT (OUTPATIENT)
Dept: PSYCHIATRY | Facility: CLINIC | Age: 49
End: 2024-06-21
Attending: PSYCHIATRY & NEUROLOGY
Payer: MEDICARE

## 2024-06-21 DIAGNOSIS — Z51.81 MEDICATION MONITORING ENCOUNTER: ICD-10-CM

## 2024-06-21 DIAGNOSIS — F25.0 SCHIZOAFFECTIVE DISORDER, BIPOLAR TYPE (H): Primary | ICD-10-CM

## 2024-06-21 DIAGNOSIS — Z79.899 ENCOUNTER FOR LONG-TERM (CURRENT) USE OF MEDICATIONS: ICD-10-CM

## 2024-06-21 PROCEDURE — 90833 PSYTX W PT W E/M 30 MIN: CPT | Mod: 4UV

## 2024-06-21 PROCEDURE — 99214 OFFICE O/P EST MOD 30 MIN: CPT | Mod: 95

## 2024-06-21 RX ORDER — QUETIAPINE FUMARATE 50 MG/1
25-50 TABLET, FILM COATED ORAL 2 TIMES DAILY PRN
Qty: 60 TABLET | Refills: 2 | Status: SHIPPED | OUTPATIENT
Start: 2024-06-21 | End: 2024-07-20

## 2024-06-21 RX ORDER — QUETIAPINE FUMARATE 400 MG/1
400 TABLET, FILM COATED ORAL AT BEDTIME
Qty: 30 TABLET | Refills: 2 | Status: SHIPPED | OUTPATIENT
Start: 2024-06-21 | End: 2024-07-20

## 2024-06-21 ASSESSMENT — ANXIETY QUESTIONNAIRES
3. WORRYING TOO MUCH ABOUT DIFFERENT THINGS: SEVERAL DAYS
6. BECOMING EASILY ANNOYED OR IRRITABLE: SEVERAL DAYS
2. NOT BEING ABLE TO STOP OR CONTROL WORRYING: NEARLY EVERY DAY
IF YOU CHECKED OFF ANY PROBLEMS ON THIS QUESTIONNAIRE, HOW DIFFICULT HAVE THESE PROBLEMS MADE IT FOR YOU TO DO YOUR WORK, TAKE CARE OF THINGS AT HOME, OR GET ALONG WITH OTHER PEOPLE: SOMEWHAT DIFFICULT
GAD7 TOTAL SCORE: 11
5. BEING SO RESTLESS THAT IT IS HARD TO SIT STILL: SEVERAL DAYS
8. IF YOU CHECKED OFF ANY PROBLEMS, HOW DIFFICULT HAVE THESE MADE IT FOR YOU TO DO YOUR WORK, TAKE CARE OF THINGS AT HOME, OR GET ALONG WITH OTHER PEOPLE?: SOMEWHAT DIFFICULT
7. FEELING AFRAID AS IF SOMETHING AWFUL MIGHT HAPPEN: NEARLY EVERY DAY
4. TROUBLE RELAXING: SEVERAL DAYS
7. FEELING AFRAID AS IF SOMETHING AWFUL MIGHT HAPPEN: NEARLY EVERY DAY
GAD7 TOTAL SCORE: 11
1. FEELING NERVOUS, ANXIOUS, OR ON EDGE: SEVERAL DAYS
GAD7 TOTAL SCORE: 11

## 2024-06-21 ASSESSMENT — PATIENT HEALTH QUESTIONNAIRE - PHQ9
SUM OF ALL RESPONSES TO PHQ QUESTIONS 1-9: 7
SUM OF ALL RESPONSES TO PHQ QUESTIONS 1-9: 7
10. IF YOU CHECKED OFF ANY PROBLEMS, HOW DIFFICULT HAVE THESE PROBLEMS MADE IT FOR YOU TO DO YOUR WORK, TAKE CARE OF THINGS AT HOME, OR GET ALONG WITH OTHER PEOPLE: SOMEWHAT DIFFICULT

## 2024-06-21 ASSESSMENT — PAIN SCALES - GENERAL: PAINLEVEL: EXTREME PAIN (9)

## 2024-06-21 NOTE — PROGRESS NOTES
"Virtual Visit Details    Type of service:  Video Visit   Video Start Time: {video visit start/end time for provider to select:400661}  Video End Time:{video visit start/end time for provider to select:622734}    Originating Location (pt. Location): {video visit patient location:020749::\"Home\"}  {PROVIDER LOCATION On-site should be selected for visits conducted from your clinic location or adjoining [facility] hospital, academic office, or other nearby [facility] building. Off-site should be selected for all other provider locations, including home:602787}  Distant Location (provider location):  {virtual location provider:298744}  Platform used for Video Visit: {Virtual Visit Platforms:695952::\"[employer]\"}  "

## 2024-06-21 NOTE — NURSING NOTE
Is the patient currently in the state of [place]? YES    Visit mode:VIDEO    If the visit is dropped, the patient can be reconnected by: VIDEO VISIT: Text to cell phone:   Telephone Information:   Mobile [phone]       Will anyone else be joining the visit? No  (If patient encounters technical issues they should call [phone])    How would you like to obtain your AVS? MyChart    Are changes needed to the allergy or medication list? No    Are refills needed on medications prescribed by this physician? NO    Rooming Documentation: Assigned questionnaire(s) completed .    Reason for visit: ALISA Ayon

## 2024-06-21 NOTE — PATIENT INSTRUCTIONS
**For crisis resources, please see the information at the end of this document**   Patient Education    Thank you for coming to the [facility] [place] [facility].     Lab Testing:  If you had lab testing today and your results are reassuring or normal they will be mailed to you or sent through waygum within 7 days. If the lab tests need quick action we will call you with the results. The phone number we will call with results is # [phone]. If this is not the best number please call our clinic and change the number.     Medication Refills:  If you need any refills please call your pharmacy and they will contact us. Our fax number for refills is [phone].   Three business days of notice are needed for general medication refill requests.   Five business days of notice are needed for controlled substance refill requests.   If you need to change to a different pharmacy, please contact the new pharmacy directly. The new pharmacy will help you get your medications transferred.     Contact Us:  Please call [phone] during business hours (8-5:00 M-F).   If you have medication related questions after clinic hours, or on the weekend, please call [phone].     Financial Assistance [phone]   Medical Records [phone]       MENTAL HEALTH CRISIS RESOURCES:  For a emergency help, please call 911 or go to the nearest Emergency Department.     Emergency Walk-In Options:   EmPATH Unit @ [facility] ([place]): [phone] - Specialized mental health emergency area designed to be calming  [facility] ([place]): [phone]  [facility] ([place]): [phone]  [facility]): [phone]    [facility] Crisis Information:   [place]: [phone]  [place]ván: [phone]  Kayla (SAURABH) - Adult: [phone]     Child: [phone]  [place] - Adult: [phone]     Child: [phone]  [place]:  [phone]  List of all [facility] resources:   https://[place].gov/dhs/people-we-serve/adults/health-care/mental-health/resources/crisis-contacts.jsp    National Crisis Information:   Crisis Text Line: Text  [place]  to 030108  Suicide & Crisis Lifeline: 988  National Suicide Prevention Lifeline: 9-498-603-TALK (1-264.880.7018)       For online chat options, visit https://suicidepreventionlifeline.org/chat/  Poison Control Center: 1-375-312-3258  Trans Lifeline: 3-457-411-6607 - Hotline for transgender people of all ages  [employer]: 4-784-168-6245 - Hotline for LGBT youth     For Non-Emergency Support:   Fast Tracker: Mental Health & Substance Use Disorder Resources -   https://www.[place].org/

## 2024-06-21 NOTE — PROGRESS NOTES
Virtual Visit Details    Type of service:  Video Visit   Video Start Time: 2:04 PM  Video End Time:2:33 PM    Originating Location (pt. Location): Home    Distant Location (provider location):  On-site  Platform used for Video Visit: [facility]  MEDICAL PROGRESS NOTE     CARE TEAM:    PCP- Physician No Ref-Primary  Therapist- None    Teresa is a 48 year old who uses the pronouns she, her, hers.      Diagnoses     Schizoaffective disorder, bipolar type (previously diagnosed with bipolar disorder), MRE hypomanic    Bereavement  Metastatic breast cancer     Assessment     Teresa was seen today for follow-up and ongoing medication management. Issues discussed are included below:    - Depression/Anxiety: Less emotional dysregulation than at previous assessment, but has noticed persistent feelings of anxiety and episodic low mood that have been more difficult to manage. Given relative stability, will trial a slight increase in sertraline to assess for possible benefit for anxiety and depressed mood.    - Schizoaffective Disorder: Overall symptoms that had been concerning for tom are resolved at this time. Has continued to use Seroquel appropriately and will continue with current regimen given ongoing degree of medical stressors..    Future Considerations:  - Consider switching Seroquel to alternative SGA if no improvement with dose optimization  - Increase in sertraline    Psychotropic Drug Interactions:  [PSYCHCLINICDDI]  ADDITIVE SEDATION: Butrans, Robaxin, Seroquel  Management: Routine Monitoring, refer for MTM  for evaluation for DDI with full array of medications    [place] was not [place] today:  writer not prescribing controlled substances at this time    Risk Statements:   Treatment Risk- Risks, benefits, alternatives and potential adverse effects have been discussed and are understood.   Safety Risk-Teresa did not  "appear to be an imminent safety risk to self or others.     Plan     1) Medications:   - continue scheduled Seroquel 400mg at bedtime   - continue Seroquel 25-50mg BID PRN  - Increase sertraline to 75mg daily     Prescribed by Heme Onc/Other Providers:  - Butrans 5mcg/hr wk patch  - Gabapentin 600 mg in the morning, 600 mg at 2 pm and 600 mg at bedtime  - Robaxin 500 mg TID PRN for muscle spasms  - Rivaroxaban 20mg tablet daily    2) Psychotherapy: Writer to contact prior therapist about re-establishing vs referral to new provider for individual therapy    3) Next due:  Labs- SGA monitoring labs due as of 2024  EKG- Routine monitoring is not indicated for current psychotropic medication regimen   Rating scales- AIMS: due at next in-person appointment    4) Referrals: Therapy- Health psychology appointment scheduled - 2024    5) Other: none    6) Follow-up: Return to clinic in 4 weeks with new PGY-3 provider     Pertinent Background                                                   [most recent eval 23]   Medical: Diagnosed with L breast cancer in Dec 2020 after presenting with a few months of back pain. Mets to L4, L5, left iliac bone as well as paraspinal mass. 2021 started Ibrance, zoladex, and anastrozole; 21- s/p radiofrequency ablation, keloplasty/ segmental plasty of L4; 2021- showing complete response to treatment.  Psych: Lifetime history of \"rapid mood shifts\". S/P 7-8 hospitalizations, all in [place] the 1[place] [facility] [place] Seroquel started at that time, took 100mg TID x yrs. Details in eval from said period. Hospitalization [id number] is discussed in eval and documents what sounds like a full manic episode. CA dx'd 2020. Zoloft started after the cancer dx. Seroquel 100mg was stopped 2021 (not helping), doxepin and Ambien tried for sleep-neither helped. Son  2022 by accidental overdose.   Meds: Seroquel x years 100mg-300mg was helpful but 300mg too " "sedating & became  less effective over time; Invega for a short period; Zyprexa was helpful; no Haldol or Risperdal; Lithium did not help (? 2 yrs ago) same for Depakote; trazodone; no HOWELL     Pertinent Items Include: suicidal ideation, psychosis, tom, mutiple   psychotropic trials, trauma hx, violent behavior, psych hosps, cocaine 2019. Last hosp  Nov 2021.     Subjective     States that a couple days ago had a period of marked happiness and \"david\" feeling that persisted despite the updates from her oncology team. When going to a radiology appointment a few days later and was told she needed to do 3 weeks of radiation and it felt like her feeling of david disappeared. Primary stressor has been worrying about her radiation and cancer.    Marked degree of emotional dysregulation and sleep disruption that had been present at last assessment has improved overall in the interim. Still feels like her mood bounces \"up down\" all the time, although it is to a lesser degree than previous. Primarily tied to situational stressors related to her medical care.    Continuing to take all medications as-directed.     No reported SI/HI or other safety concerns at this time.    Recent Psych Symptoms:   Depression:  depressed mood, low energy, insomnia, feeling hopeless, excessive crying, overwhelmed, and mood dysregulation  Elevated:  none - less prevalent since last appointment, symptoms now more associated with anxiety and dysregulation  Psychosis:  auditory hallucinations - not since last appointment  Anxiety:  feeling fearful and nervous/overwhelmed  Trauma Related:   Medical trauma/stressors, anxiety increased when thinking about medical procedures and results  Insomnia:  Yes: intermittent difficulty with both initiation and maintenance due to worries at night - somewhat improve with increased Seroquel  Other:  No    Current Social History:  Financial/occupational: on disability for finances currently  Living situation (partner, " "children, pets, etc): Rents an apartment, son has a room with her but doesn't stay there often, 1 cat (\"Toejoe\")  Social/spiritual support: Prayer is important, best friend in [place], sister in [place], son  Feels safe at home: Yes    Pertinent Substance Use:   Alcohol: Rarely, maybe once every other month   Cannabis: Infrequently - 1-2x per month for refractory pain  Tobacco: Yes: about 6-7 (often doesn't finish them)  Caffeine:  Yes: about 4-5 cans of coke per day, 1 cup of coffee in morning - recently reducing this intake  Opioids: Yes: prescribed   Narcan Kit current: Yes  Other substances: none    Medical Review of Systems:   Lightheadedness/orthostasis: None  Headaches: Very rare tension headaches  GI: none  Sexual health concerns: None    A comprehensive review of systems was performed and is negative other than noted above.    Contraception: \"tubes are tied\"     Mental Status Exam     Alertness: alert  and oriented  Appearance: adequately groomed  Behavior/Demeanor:  Less agitated, but remains tense , with good  eye contact   Speech: increased rate and increased volume when feeling most distressed  Language: intact and no problems  Psychomotor:  Persistent rocking while seated  Mood: depressed, anxious, and worried  Affect:  Anxious and stressed ; congruent to: mood- yes, content- yes  Thought Process/Associations: unremarkable  Thought Content:  Reports none;  Denies suicidal & violent ideation and delusions and paranoid ideation  Perception:  Reports auditory hallucinations without commands [details in history];  Denies visual hallucinations  Insight: fair - open to review and discussion of symptoms across diagnosis, but some difficulty with specific identification of symptoms potentially related to schizoaffective disorder  Judgment: fair  Cognition: does  appear grossly intact; formal cognitive testing was not done  Gait and Station: N/A (telehealth)     Past Psych Med Trials      Medication Max Dose " (mg) Dates / Duration Helpful? DC Reason / Adverse Effects?   Seroquel 400mg  yes Max dose of 500mg via 400mg QHS plus 50mg BID PRN   Zoloft 450mg  yes    Gabapentin 600mg TID  ?    Olanzapine 5mg BID      Depakote   no    lithium   no    Invega    Only brief trial   Ambien          Vitals   There were no vitals taken for this visit.  Pulse Readings from Last 3 Encounters:   06/14/24 74   06/10/24 72   06/06/24 64     Wt Readings from Last 3 Encounters:   06/14/24 107.5 kg (237 lb)   06/10/24 107.5 kg (237 lb)   06/06/24 107 kg (236 lb)     BP Readings from Last 3 Encounters:   06/14/24 (!) 153/102   06/10/24 (!) 160/96   06/06/24 (!) 159/98        Medical History     ALLERGIES: Contrast dye    Patient Active Problem List   Diagnosis    Breast mass    Spine metastasis    Urinary tract infection with hematuria    Malignant neoplasm of overlapping sites of left breast in female, estrogen receptor positive (H)    Carcinoma of left breast metastatic to bone (H)    Metastasis to bone (H)    Pain of right lower leg    Malignant neoplasm of female breast, unspecified estrogen receptor status, unspecified laterality, unspecified site of breast (H)    Schizoaffective disorder, bipolar type (H)    Insomnia, unspecified type        Medications     Current Outpatient Medications   Medication Sig Dispense Refill    [START ON 7/4/2024] abemaciclib (VERZENIO) 100 MG tablet Take 1 tablet (100 mg) by mouth 2 times daily for 28 days 56 tablet 0    abemaciclib (VERZENIO) 100 MG tablet Take 1 tablet (100 mg) by mouth 2 times daily for 28 days 56 tablet 0    buprenorphine (BUTRANS) 10 MCG/HR WK patch Place 1 patch onto the skin every 7 days 4 patch 0    buprenorphine (BUTRANS) 20 MCG/HR WK patch Place 1 patch onto the skin every 7 days 4 patch 2    gabapentin (NEURONTIN) 300 MG capsule Take 2 capsules in the morning, 2 capsules in afternoon, and 3 capsules at bedtime. 210 capsule 1    methocarbamol (ROBAXIN) 500 MG tablet Take 2-3  tablets (1,000-1,500 mg) by mouth 3 times daily as needed for muscle spasms 210 tablet 2    methylPREDNISolone (MEDROL) 32 MG tablet Take 1 tablet (32 mg) by mouth daily 12 hours prior to the procedure with IV contrast 2 tablet 0    QUEtiapine (SEROQUEL) 400 MG tablet Take 1 tablet (400 mg) by mouth at bedtime 30 tablet 2    QUEtiapine (SEROQUEL) 50 MG tablet Take 0.5-1 tablets (25-50 mg) by mouth 2 times daily as needed (for sleep or acute agitation) 60 tablet 2    rivaroxaban ANTICOAGULANT (XARELTO) 20 MG TABS tablet Take 1 tablet (20 mg) by mouth daily (with dinner) 90 tablet 3    sertraline (ZOLOFT) 50 MG tablet Take 1 tablet (50 mg) by mouth daily 30 tablet 2    Vitamin D3 (CHOLECALCIFEROL) 25 mcg (1000 units) tablet Take 1 tablet (25 mcg) by mouth daily 90 tablet 3    exemestane (AROMASIN) 25 MG tablet Take 1 tablet (25 mg) by mouth daily (Patient not taking: Reported on 5/9/2024) 90 tablet 3    loperamide (IMODIUM) 2 MG capsule Start with 2 caps (4 mg), then take one cap (2 mg) after each diarrheal stool as needed. Do not use more than 8 caps (16 mg) per day. (Patient not taking: Reported on 5/9/2024) 30 capsule 0    methylPREDNISolone (MEDROL) 32 MG tablet Take 1 tablet (32 mg) 12 hours and again 2 hours prior to the procedure with IV contrast (Patient not taking: Reported on 5/9/2024) 2 tablet 0    naloxone (NARCAN) 4 MG/0.1ML nasal spray Spray 1 spray (4 mg) into one nostril alternating nostrils once as needed for opioid reversal every 2-3 minutes until assistance arrives (Patient not taking: Reported on 4/8/2024) 0.2 mL 0    ondansetron (ZOFRAN) 8 MG tablet Take 1 tablet (8 mg) by mouth every 8 hours as needed for nausea (Patient not taking: Reported on 2/6/2024) 30 tablet 3    pantoprazole (PROTONIX) 40 MG EC tablet Take 1 tablet (40 mg) by mouth every morning (before breakfast) (Patient not taking: Reported on 3/15/2024) 30 tablet 1    polyethylene glycol (MIRALAX) 17 GM/Dose powder Take 17 g by mouth  daily as needed for constipation (Patient not taking: Reported on 5/9/2024) 578 g 3    prochlorperazine (COMPAZINE) 10 MG tablet Take 1 tablet (10 mg) by mouth every 6 hours as needed for nausea or vomiting (Patient not taking: Reported on 4/8/2024) 30 tablet 2    prochlorperazine (COMPAZINE) 10 MG tablet Take 1 tablet (10 mg) by mouth every 6 hours as needed for nausea or vomiting (Patient not taking: Reported on 4/8/2024) 30 tablet 3    SENNA-docusate sodium (SENNA S) 8.6-50 MG tablet Take 2 tablets by mouth 2 times daily as needed (Constipation) (Patient not taking: Reported on 5/9/2024) 100 tablet 3        Labs and Data         10/17/2022    10:42 AM 12/8/2023     2:24 PM 6/21/2024     1:58 PM   PROMIS-10 Total Score w/o Sub Scores   PROMIS TOTAL - SUBSCORES 9 15 12          No data to display                  2/9/2024     3:14 PM 2/23/2024     2:48 PM 6/21/2024     1:55 PM   PHQ-9 SCORE   PHQ-9 Total Score MyChart 19 (Moderately severe depression)  7 (Mild depression)   PHQ-9 Total Score 19 11 7         4/10/2023    10:07 AM 12/8/2023     2:26 PM 6/21/2024     1:56 PM   JENNIFFER-7 SCORE   Total Score 21 (severe anxiety) 20 (severe anxiety) 11 (moderate anxiety)   Total Score 21 20 11       Liver/Kidney Function, TSH Metabolic Blood counts   Recent Labs   Lab Test 06/06/24  1340 05/09/24  1641   AST 18 15   ALT 15 9   ALKPHOS 94 77   CR 1.03* 1.10*     Recent Labs   Lab Test 03/16/23  1650   TSH 0.80    Recent Labs   Lab Test 01/05/23  1301   CHOL 150   TRIG 114   LDL 69   HDL 58     Recent Labs   Lab Test 01/05/23  1301   A1C 6.4*     Recent Labs   Lab Test 06/06/24  1340   *    Recent Labs   Lab Test 06/06/24  1340   WBC 4.4   HGB 11.0*   HCT 32.5*   *   *           ECG 10/19/23 QTc = 423ms    PROVIDER: Boris Taylor MD    Level of Medical Decision Making:   - At least 1 chronic problem that is not stable  - Engaged in prescription drug management during visit (discussed any medication  benefits, side effects, alternatives, etc.)       Psychiatry Individual Psychotherapy Note   Psychotherapy start time - 1405  Psychotherapy end time - 1422  Date treatment plan last reviewed with patient - 12/04/23  Subjective: This supportive psychotherapy session addressed issues related to goals of therapy and current psychosocial stressors. Patient's reaction: Preparatory in the context of mental status appropriate for ambulatory setting.    Interactive complexity indicated? Yes, visit entailed Interactive Complexity evidenced by:  -The need to manage maladaptive communication (related to, e.g., high anxiety, high reactivity, repeated questions, or disagreement) among participants that complicates delivery of care  Plan: RTC in timeframe noted above  Psychotherapy services during this visit included myself and the patient.   Treatment Plan      SYMPTOMS; PROBLEMS   MEASURABLE GOALS;    FUNCTIONAL IMPROVEMENT / GAINS INTERVENTIONS DISCHARGE CRITERIA   Depression: depressed mood, low energy, feeling hopelesss, and excessive crying  Anxiety: excessive worry, feeling fearful, and nervous/overwhelmed  Trauma Related: intrusive memories, trauma trigger psychological / physiological response, and mood dysregulation  Psychosis: auditory hallucinations and disorganized behavior  Dysregulation: mood dysregulation and physically agitated   reduce depressive symptoms, find enjoyment at least once a day, learn best practices for sleep, reduce panic attacks/ excessive worry, make a plan to manage 2-3 anxiety-provoking situations, reduce feeling overwhelmed/ improve decision making skills, develop 2 strategies to cope with trauma triggers/intrusive memories, reduce manic/hypomanic episodes, and identify coping strategies for AH Supportive / psychodynamic marked symptom improvement, reduced visit frequency, and can transfer back to primary care     Patient staffed in clinic with Dr. Nails who will sign the note.  Supervisor  is Dr. Foss.

## 2024-06-21 NOTE — PROGRESS NOTES
Palliative Care Progress Note    Patient Name: Teresa Sanderson  Primary Provider: No Ref-Primary, Physician    Chief Complaint/Patient ID:   Medical - She has metastatic breast cancer dx 2020; widespread bone mets. S/p RT to lumbar spine and RFA/kyphoplasty L4 2021.   2022 on Ibrance and anastrazole since 2021 (with some treatment interruptions). Long discussion about voluntary opioid tapering 11/15/22 palliative visit.      She has schizoaffective disorder (bipolar type); followed by psychiatry at the .     -Currently on treatment with palbociclib and exemestane. PET/CT in 2023 showed disease progression in two small bone metastasis in L pelvis, S/p 5 fractions RT to these lesions.   -Some lapses/breaks in treatment due to complicated social situation.  -Evidence of PD on 2023 PET scan.  Changed to Verzenio 2024.    Last Palliative care appointment: 2024 with me.     Reviewed: Yes    Social History: Lives with cousin and son. On SSD. 5 adult children. Housing insecure. Daughter murdered in . Son  Spring 2022. Worked as a  for [employer] before cancer dx, in [place].   No LOYD hx    Interim History:  Teresa Sanderson is a 48 year old female who is seen today for follow up with Palliative Care via billable video visit.     Since our last visit, she sent a message that she did want to restart the Butrans patch.  Started with 10 mcg dose and then increased back up to 20 mcg dose after a week.    Reviewed oncology note from 6/10.  Reviewed that there was some evidence of bony progression.  Recommended referral to Rad Onc for treatment of left ilium and left acetabulum, which will allow her to remain on her current treatment regimen of reduced dose Abemaciclib and fulvestrant.    Notes that pain has definitely been more prevalent lately and that she knew that there was going to be some sort of worsening of her disease on her PET scan.  She says she was doing okay coping  "until she realized the burden of all of the appointments related with radiation, though she understands it will be helpful for pain.  Says that the pain in her lower back is now at the level that it was when she went into the emergency room for her initial assessment in 2020 prior to her diagnosis.  Had a fall 2 nights ago, which unfortunately really exacerbated her pain even more.  Says \"I broke my walker out\".    Does not feel the Butrans patch has done much for her pain, and they do not stay on.  Also tried topical Aspercreme, as her son wondered if that could be arthritis, and this also did not help.  Currently only using naproxen, her gabapentin, and the Robaxin.  Describes a pulling sensation in her back as well as the spasm/throbbing pain in her left leg.  She cannot stay in any position for too long due to the pain.    Much of her financial stress is improved as her CADI waiver went through and she now has help with transportation to appointments.  Is helping some with the thoughts of all the radiation appointments.    Struggling with her mood being up and down.  Did have a visit with her psychiatrist yesterday.    Physical Exam:   Constitutional: Alert, pleasant, no apparent distress. Lying back in bed.  Eyes: Sclera non-icteric, no eye discharge.  ENT: No nasal discharge. Ears grossly normal.  Respiratory: Unlabored respirations. Speaking in full sentences.  Musculoskeletal: Extremities appear normal- no gross deformities noted. No edema noted on upper body.   Skin: No suspicious lesions or rashes on visible skin.  Neurologic: Clear speech, no aphasia. No facial droop.  Psychiatric: Mentation appears normal, appropriate attention. Affect normal/bright. Does not appear anxious or depressed.    Key Data Reviewed:  LABS:   Lab Results   Component Value Date    WBC 4.4 06/06/2024    HGB 11.0 (L) 06/06/2024    HCT 32.5 (L) 06/06/2024     (L) 06/06/2024     06/06/2024    POTASSIUM 3.5 06/06/2024    " CHLORIDE 105 06/06/2024    CO2 26 06/06/2024    BUN 10.3 06/06/2024    CR 1.03 (H) 06/06/2024     (H) 06/06/2024    DD 1.0 (H) 12/09/2020    TROPONIN <0.015 08/10/2021    TROPI <0.015 12/08/2020    AST 18 06/06/2024    ALT 15 06/06/2024    ALKPHOS 94 06/06/2024    BILITOTAL 0.2 06/06/2024    INR 1.10 10/19/2023     6/6/24- GFR 67, Albumin 4. CA 15-3 of 65 (mostly stable).    IMAGING: PET scan 6/6/24- IMPRESSION:      1. Stable to slightly decreased metabolic activity in the left breast  masses compared to 12/11/2023. No significant change in size.  2. Increased metabolic activity in the left iliac and acetabular bone  metastases concerning for osseous disease progression. Slightly  decreased metabolic activity in the T7 vertebral body lesion.  3. Patchy FDG uptake in the thyroid consistent with Hashimoto  thyroiditis.  4. Stable L4 vertebroplasty with cement extravasation into the IVC.    Impression & Recommendations & Counseling:  Teresa Sanderson is a 48 year old female with history of metastatic breast cancer.     Pain - Ongoing struggle-different types of pain concurrently.  No benefit from second trial of Butrans patch, so recommend stopping this.  Given the new bony mets, it does seem reasonable to trial short acting oxycodone again (she previously did not seem to have much benefit from her chronic pain with opioids, and she had difficulty with consistency.  With the new bony mets, a retrial is appropriate).  -Rx sent for oxycodone 5-10mg Q4H PRN.  Discussed taking the oxycodone prior to an activity that she knows will cause pain.  -Discontinue Butrans patch  -Continue Robaxin and gabapentin.  -Depending on effect of radiation, could consider low-dose prescription for dexamethasone.  Would recommend holding the naproxen that she takes on a regular basis if Dex is prescribed.  -Overloaded with appointments right now due to radiation, however once she is through radiation, I would recommend a referral to  Dr. Bell in PM&R.  I do wonder if there is a component of her back pain that might respond to injections.    Mood - Following with psychiatry.  Is up to 400 mg she says of Seroquel nightly to help with sleep.      Follow up: 1 to 2 months      Video-Visit Details  Video Start Time: 3:19PM  Video End Time: 3:45 PM    Originating Location (pt. Location): Home     Distant Location (provider location):  Offsite- Personal Home      Platform used for Video Visit: Chad     Total time spent on day of encounter is 35 mins, including reviewing record, review of above studies, above visit with patient, symptomatic discussion as above, including medication adjustments/prescription management, and documentation.       Ayanna Tellez,   Palliative Medicine   [facility] [id number]    Some chart documentation performed using Dragon Voice recognition Software. Although reviewed after completion, some words and grammatical errors may remain.

## 2024-06-24 ENCOUNTER — VIRTUAL VISIT (OUTPATIENT)
Dept: ONCOLOGY | Facility: CLINIC | Age: 49
End: 2024-06-24
Attending: STUDENT IN AN ORGANIZED HEALTH CARE EDUCATION/TRAINING PROGRAM
Payer: MEDICARE

## 2024-06-24 DIAGNOSIS — G89.3 CANCER ASSOCIATED PAIN: ICD-10-CM

## 2024-06-24 DIAGNOSIS — Z17.0 MALIGNANT NEOPLASM OF OVERLAPPING SITES OF LEFT BREAST IN FEMALE, ESTROGEN RECEPTOR POSITIVE (H): Primary | ICD-10-CM

## 2024-06-24 DIAGNOSIS — Z51.5 ENCOUNTER FOR PALLIATIVE CARE: ICD-10-CM

## 2024-06-24 DIAGNOSIS — M54.9 BACK PAIN WITH HISTORY OF SPINAL SURGERY: ICD-10-CM

## 2024-06-24 DIAGNOSIS — C79.51 METASTASIS TO BONE (H): ICD-10-CM

## 2024-06-24 DIAGNOSIS — F25.0 SCHIZOAFFECTIVE DISORDER, BIPOLAR TYPE (H): ICD-10-CM

## 2024-06-24 DIAGNOSIS — Z98.890 BACK PAIN WITH HISTORY OF SPINAL SURGERY: ICD-10-CM

## 2024-06-24 DIAGNOSIS — C50.812 MALIGNANT NEOPLASM OF OVERLAPPING SITES OF LEFT BREAST IN FEMALE, ESTROGEN RECEPTOR POSITIVE (H): Primary | ICD-10-CM

## 2024-06-24 PROCEDURE — 99214 OFFICE O/P EST MOD 30 MIN: CPT | Mod: 95 | Performed by: STUDENT IN AN ORGANIZED HEALTH CARE EDUCATION/TRAINING PROGRAM

## 2024-06-24 RX ORDER — OXYCODONE HYDROCHLORIDE 5 MG/1
5-10 TABLET ORAL EVERY 4 HOURS PRN
Qty: 90 TABLET | Refills: 0 | Status: SHIPPED | OUTPATIENT
Start: 2024-06-24 | End: 2024-07-24

## 2024-06-24 RX ORDER — BUPRENORPHINE 10 UG/H
1 PATCH TRANSDERMAL
Qty: 4 PATCH | Refills: 0 | Status: CANCELLED | OUTPATIENT
Start: 2024-06-24

## 2024-06-24 RX ORDER — BUPRENORPHINE 20 UG/H
1 PATCH TRANSDERMAL
Qty: 4 PATCH | Refills: 2 | Status: CANCELLED | OUTPATIENT
Start: 2024-06-24

## 2024-06-24 NOTE — NURSING NOTE
Is the patient currently in the state of [place]? YES    Visit mode:VIDEO    If the visit is dropped, the patient can be reconnected by: VIDEO VISIT: Text to cell phone:   Telephone Information:   Mobile [phone]       Will anyone else be joining the visit? NO  (If patient encounters technical issues they should call [phone]    How would you like to obtain your AVS? MyChart    Are changes needed to the allergy or medication list? Pt stated no changes to allergies and Pt stated no med changes    Are refills needed on medications prescribed by this physician? YES    Reason for visit: RECHECK    Jorge CUELLAR

## 2024-06-24 NOTE — LETTER
2024      Teresa Sanderson  [address]  [facility] [address]      Dear Colleague,    Thank you for referring your patient, Teresa Sanderson, to the [facility] [place]. Please see a copy of my visit note below.    Palliative Care Progress Note    Patient Name: Teresa Sanderson  Primary Provider: No Ref-Primary, Physician    Chief Complaint/Patient ID:   Medical - She has metastatic breast cancer dx 2020; widespread bone mets. S/p RT to lumbar spine and RFA/kyphoplasty L4 2021.   2022 on Ibrance and anastrazole since 2021 (with some treatment interruptions). Long discussion about voluntary opioid tapering 11/15/22 palliative visit.      She has schizoaffective disorder (bipolar type); followed by psychiatry at the .     -Currently on treatment with palbociclib and exemestane. PET/CT in 2023 showed disease progression in two small bone metastasis in L pelvis, S/p 5 fractions RT to these lesions.   -Some lapses/breaks in treatment due to complicated social situation.  -Evidence of PD on 2023 PET scan.  Changed to Verzenio 2024.    Last Palliative care appointment: 2024 with me.     Reviewed: Yes    Social History: Lives with cousin and son. On SSD. 5 adult children. Housing insecure. Daughter murdered in . Son  Spring 2022. Worked as a  for [employer] before cancer dx, in [place].   No LOYD hx    Interim History:  Teresa Sanderson is a 48 year old female who is seen today for follow up with Palliative Care via billable video visit.     Since our last visit, she sent a message that she did want to restart the Butrans patch.  Started with 10 mcg dose and then increased back up to 20 mcg dose after a week.    Reviewed oncology note from 6/10.  Reviewed that there was some evidence of bony progression.  Recommended referral to Rad Onc for treatment of left ilium and left acetabulum, which will allow her to remain on her current  "treatment regimen of reduced dose Abemaciclib and fulvestrant.    Notes that pain has definitely been more prevalent lately and that she knew that there was going to be some sort of worsening of her disease on her PET scan.  She says she was doing okay coping until she realized the burden of all of the appointments related with radiation, though she understands it will be helpful for pain.  Says that the pain in her lower back is now at the level that it was when she went into the emergency room for her initial assessment in 2020 prior to her diagnosis.  Had a fall 2 nights ago, which unfortunately really exacerbated her pain even more.  Says \"I broke my walker out\".    Does not feel the Butrans patch has done much for her pain, and they do not stay on.  Also tried topical Aspercreme, as her son wondered if that could be arthritis, and this also did not help.  Currently only using naproxen, her gabapentin, and the Robaxin.  Describes a pulling sensation in her back as well as the spasm/throbbing pain in her left leg.  She cannot stay in any position for too long due to the pain.    Much of her financial stress is improved as her CADI waiver went through and she now has help with transportation to appointments.  Is helping some with the thoughts of all the radiation appointments.    Struggling with her mood being up and down.  Did have a visit with her psychiatrist yesterday.    Physical Exam:   Constitutional: Alert, pleasant, no apparent distress. Lying back in bed.  Eyes: Sclera non-icteric, no eye discharge.  ENT: No nasal discharge. Ears grossly normal.  Respiratory: Unlabored respirations. Speaking in full sentences.  Musculoskeletal: Extremities appear normal- no gross deformities noted. No edema noted on upper body.   Skin: No suspicious lesions or rashes on visible skin.  Neurologic: Clear speech, no aphasia. No facial droop.  Psychiatric: Mentation appears normal, appropriate attention. Affect " normal/bright. Does not appear anxious or depressed.    Key Data Reviewed:  LABS:   Lab Results   Component Value Date    WBC 4.4 06/06/2024    HGB 11.0 (L) 06/06/2024    HCT 32.5 (L) 06/06/2024     (L) 06/06/2024     06/06/2024    POTASSIUM 3.5 06/06/2024    CHLORIDE 105 06/06/2024    CO2 26 06/06/2024    BUN 10.3 06/06/2024    CR 1.03 (H) 06/06/2024     (H) 06/06/2024    DD 1.0 (H) 12/09/2020    TROPONIN <0.015 08/10/2021    TROPI <0.015 12/08/2020    AST 18 06/06/2024    ALT 15 06/06/2024    ALKPHOS 94 06/06/2024    BILITOTAL 0.2 06/06/2024    INR 1.10 10/19/2023     6/6/24- GFR 67, Albumin 4. CA 15-3 of 65 (mostly stable).    IMAGING: PET scan 6/6/24- IMPRESSION:      1. Stable to slightly decreased metabolic activity in the left breast  masses compared to 12/11/2023. No significant change in size.  2. Increased metabolic activity in the left iliac and acetabular bone  metastases concerning for osseous disease progression. Slightly  decreased metabolic activity in the T7 vertebral body lesion.  3. Patchy FDG uptake in the thyroid consistent with Hashimoto  thyroiditis.  4. Stable L4 vertebroplasty with cement extravasation into the IVC.    Impression & Recommendations & Counseling:  Teresa Sanderson is a 48 year old female with history of metastatic breast cancer.     Pain - Ongoing struggle-different types of pain concurrently.  No benefit from second trial of Butrans patch, so recommend stopping this.  Given the new bony mets, it does seem reasonable to trial short acting oxycodone again (she previously did not seem to have much benefit from her chronic pain with opioids, and she had difficulty with consistency.  With the new bony mets, a retrial is appropriate).  -Rx sent for oxycodone 5-10mg Q4H PRN.  Discussed taking the oxycodone prior to an activity that she knows will cause pain.  -Discontinue Butrans patch  -Continue Robaxin and gabapentin.  -Depending on effect of radiation, could  consider low-dose prescription for dexamethasone.  Would recommend holding the naproxen that she takes on a regular basis if Dex is prescribed.  -Overloaded with appointments right now due to radiation, however once she is through radiation, I would recommend a referral to Dr. Bell in PM&R.  I do wonder if there is a component of her back pain that might respond to injections.    Mood - Following with psychiatry.  Is up to 400 mg she says of Seroquel nightly to help with sleep.      Follow up: 1 to 2 months      Video-Visit Details  Video Start Time: 3:19PM  Video End Time: 3:45 PM    Originating Location (pt. Location): Home     Distant Location (provider location):  Offsite- Personal Home      Platform used for Video Visit: Chad     Total time spent on day of encounter is 35 mins, including reviewing record, review of above studies, above visit with patient, symptomatic discussion as above, including medication adjustments/prescription management, and documentation.       Ayanna Tellez DO  Palliative Medicine   [facility] ID [id number]    Some chart documentation performed using Dragon Voice recognition Software. Although reviewed after completion, some words and grammatical errors may remain.      Again, thank you for allowing me to participate in the care of your patient.        Sincerely,        Ayanna Tellez DO

## 2024-06-24 NOTE — LETTER
2024      Teresa Sanderson  [address]  [facility] [address]      Dear Colleague,    Thank you for referring your patient, Teresa Sanderson, to the [facility] [place]. Please see a copy of my visit note below.    Palliative Care Progress Note    Patient Name: Teresa Sanderson  Primary Provider: No Ref-Primary, Physician    Chief Complaint/Patient ID:   Medical - She has metastatic breast cancer dx 2020; widespread bone mets. S/p RT to lumbar spine and RFA/kyphoplasty L4 2021.   2022 on Ibrance and anastrazole since 2021 (with some treatment interruptions). Long discussion about voluntary opioid tapering 11/15/22 palliative visit.      She has schizoaffective disorder (bipolar type); followed by psychiatry at the .     -Currently on treatment with palbociclib and exemestane. PET/CT in 2023 showed disease progression in two small bone metastasis in L pelvis, S/p 5 fractions RT to these lesions.   -Some lapses/breaks in treatment due to complicated social situation.  -Evidence of PD on 2023 PET scan.  Changed to Verzenio 2024.    Last Palliative care appointment: 2024 with me.     Reviewed: Yes    Social History: Lives with cousin and son. On SSD. 5 adult children. Housing insecure. Daughter murdered in . Son  Spring 2022. Worked as a  for [employer] before cancer dx, in [place].   No LOYD hx    Interim History:  Teresa Sanderson is a 48 year old female who is seen today for follow up with Palliative Care via billable video visit.     Since our last visit, she sent a message that she did want to restart the Butrans patch.  Started with 10 mcg dose and then increased back up to 20 mcg dose after a week.    Reviewed oncology note from 6/10.  Reviewed that there was some evidence of bony progression.  Recommended referral to Rad Onc for treatment of left ilium and left acetabulum, which will allow her to remain on her current  "treatment regimen of reduced dose Abemaciclib and fulvestrant.    Notes that pain has definitely been more prevalent lately and that she knew that there was going to be some sort of worsening of her disease on her PET scan.  She says she was doing okay coping until she realized the burden of all of the appointments related with radiation, though she understands it will be helpful for pain.  Says that the pain in her lower back is now at the level that it was when she went into the emergency room for her initial assessment in 2020 prior to her diagnosis.  Had a fall 2 nights ago, which unfortunately really exacerbated her pain even more.  Says \"I broke my walker out\".    Does not feel the Butrans patch has done much for her pain, and they do not stay on.  Also tried topical Aspercreme, as her son wondered if that could be arthritis, and this also did not help.  Currently only using naproxen, her gabapentin, and the Robaxin.  Describes a pulling sensation in her back as well as the spasm/throbbing pain in her left leg.  She cannot stay in any position for too long due to the pain.    Much of her financial stress is improved as her CADI waiver went through and she now has help with transportation to appointments.  Is helping some with the thoughts of all the radiation appointments.    Struggling with her mood being up and down.  Did have a visit with her psychiatrist yesterday.    Physical Exam:   Constitutional: Alert, pleasant, no apparent distress. Lying back in bed.  Eyes: Sclera non-icteric, no eye discharge.  ENT: No nasal discharge. Ears grossly normal.  Respiratory: Unlabored respirations. Speaking in full sentences.  Musculoskeletal: Extremities appear normal- no gross deformities noted. No edema noted on upper body.   Skin: No suspicious lesions or rashes on visible skin.  Neurologic: Clear speech, no aphasia. No facial droop.  Psychiatric: Mentation appears normal, appropriate attention. Affect " normal/bright. Does not appear anxious or depressed.    Key Data Reviewed:  LABS:   Lab Results   Component Value Date    WBC 4.4 06/06/2024    HGB 11.0 (L) 06/06/2024    HCT 32.5 (L) 06/06/2024     (L) 06/06/2024     06/06/2024    POTASSIUM 3.5 06/06/2024    CHLORIDE 105 06/06/2024    CO2 26 06/06/2024    BUN 10.3 06/06/2024    CR 1.03 (H) 06/06/2024     (H) 06/06/2024    DD 1.0 (H) 12/09/2020    TROPONIN <0.015 08/10/2021    TROPI <0.015 12/08/2020    AST 18 06/06/2024    ALT 15 06/06/2024    ALKPHOS 94 06/06/2024    BILITOTAL 0.2 06/06/2024    INR 1.10 10/19/2023     6/6/24- GFR 67, Albumin 4. CA 15-3 of 65 (mostly stable).    IMAGING: PET scan 6/6/24- IMPRESSION:      1. Stable to slightly decreased metabolic activity in the left breast  masses compared to 12/11/2023. No significant change in size.  2. Increased metabolic activity in the left iliac and acetabular bone  metastases concerning for osseous disease progression. Slightly  decreased metabolic activity in the T7 vertebral body lesion.  3. Patchy FDG uptake in the thyroid consistent with Hashimoto  thyroiditis.  4. Stable L4 vertebroplasty with cement extravasation into the IVC.    Impression & Recommendations & Counseling:  Teresa Sanderson is a 48 year old female with history of metastatic breast cancer.     Pain - Ongoing struggle-different types of pain concurrently.  No benefit from second trial of Butrans patch, so recommend stopping this.  Given the new bony mets, it does seem reasonable to trial short acting oxycodone again (she previously did not seem to have much benefit from her chronic pain with opioids, and she had difficulty with consistency.  With the new bony mets, a retrial is appropriate).  -Rx sent for oxycodone 5-10mg Q4H PRN.  Discussed taking the oxycodone prior to an activity that she knows will cause pain.  -Discontinue Butrans patch  -Continue Robaxin and gabapentin.  -Depending on effect of radiation, could  consider low-dose prescription for dexamethasone.  Would recommend holding the naproxen that she takes on a regular basis if Dex is prescribed.  -Overloaded with appointments right now due to radiation, however once she is through radiation, I would recommend a referral to Dr. Bell in PM&R.  I do wonder if there is a component of her back pain that might respond to injections.    Mood - Following with psychiatry.  Is up to 400 mg she says of Seroquel nightly to help with sleep.      Follow up: 1 to 2 months      Video-Visit Details  Video Start Time: 3:19PM  Video End Time: 3:45 PM    Originating Location (pt. Location): Home     Distant Location (provider location):  Offsite- Personal Home      Platform used for Video Visit: Chad     Total time spent on day of encounter is 35 mins, including reviewing record, review of above studies, above visit with patient, symptomatic discussion as above, including medication adjustments/prescription management, and documentation.       Ayanna Tellez DO  Palliative Medicine   [facility] ID [id number]    Some chart documentation performed using Dragon Voice recognition Software. Although reviewed after completion, some words and grammatical errors may remain.      Again, thank you for allowing me to participate in the care of your patient.        Sincerely,        Ayanna Tellez DO

## 2024-06-25 ENCOUNTER — PATIENT OUTREACH (OUTPATIENT)
Dept: CARE COORDINATION | Facility: CLINIC | Age: 49
End: 2024-06-25
Payer: MEDICARE

## 2024-06-25 NOTE — PROGRESS NOTES
"Social Work - Distress Screen Intervention  [facility]    Identified Concern and Score from Distress Screenin. How concerned are you about your ability to eat? 0     2. How concerned are you about unintended weight loss or your current weight? 0     3. How concerned are you about feeling depressed or very sad?  6     4. How concerned are you about feeling anxious or very scared?  6     5. Do you struggle with the loss of meaning and david in your life?  (!) A great deal     6. How concerned are you about work and home life issues that may be affected by your cancer?  (!) 10     7. How concerned are you about knowing what resources are available to help you?  (!) 8     8. Do you currently have what you would describe as Latter-day or spiritual struggles? Quite a bit     9. If you want to be contacted by one of our professionals, I can send a message to them right now.  Oncology Social Worker; Oncology Dietitian; Oncology Psychologist; Oncology Spiritual Care       Date of Distress Screen: 24  Data: At time of last visit, patient scored positive on distress screening.  outreached to patient today to follow up on elevated distress and introduce psychosocial services and support.  Intervention/Education provided:  contacted patient by phone to discuss distress screening results.     Teresa endorses that she feels that things have generally gotten \"a little better\" with transportation to appointments and HSS services. Teresa reports that she still behind on rent (1.5 months) and struggles to pay for utilities and toiletries. Teresa is receptive to this clinician sending resources for her to consider for additional resource support in this area. SW to send via [employer].     Follow-up Required:  will remain available to patient for support as needed. Teresa is aware of how to outreach to Jo Ayaz for ongoing support as needed.     Keren Tolbert, " MSW, LICSW, OSW-C  Clinical - Adult Oncology  Phone: [phone]  She/Her/Hers  [facility]: Evelyn CARDOZO  8am-4:30pm  [facility]: MARIELLA Walker F 8am-4:30pm   Support Groups at [facility]: Social Work Services for Cancer Patients ([email or web address])

## 2024-06-26 ENCOUNTER — TELEPHONE (OUTPATIENT)
Dept: PHYSICAL THERAPY | Facility: CLINIC | Age: 49
End: 2024-06-26
Payer: MEDICARE

## 2024-06-26 NOTE — TELEPHONE ENCOUNTER
Pt did not show for cancer rehab PT evaluation today. She was called and reports need to reschedule after radiation treatment. CLT asked that patient please use MyChart or call if she cannot make the appointment as this is second no show evaluation and referral may be closed as no shows do not allow therapist to see patients waiting for treatment.  Pt in agreement. She did want to reschedule for 7/25/24 and this will be accomodated as patient has had some changes in social and medical status making therapy hard to attend but improving and will wait until she completes this round of radiation hoping pain will improve.  Pt was educated on spinal precautions over the phone, importance of movement to prevent DVT and use of assistive device for pain management. She would still benefit from therapy at this time but prefers to wait  Carolyn Espinoza PT,T-[email or web address]

## 2024-06-27 ENCOUNTER — APPOINTMENT (OUTPATIENT)
Dept: RADIATION ONCOLOGY | Facility: CLINIC | Age: 49
End: 2024-06-27
Attending: RADIOLOGY
Payer: MEDICARE

## 2024-06-27 PROCEDURE — 77014 PR CT GUIDE FOR PLACEMENT RADIATION THERAPY FIELDS: CPT | Mod: 26 | Performed by: RADIOLOGY

## 2024-06-27 PROCEDURE — 77386 HC IMRT TREATMENT DELIVERY, COMPLEX: CPT | Performed by: RADIOLOGY

## 2024-06-28 ENCOUNTER — APPOINTMENT (OUTPATIENT)
Dept: RADIATION ONCOLOGY | Facility: CLINIC | Age: 49
End: 2024-06-28
Attending: RADIOLOGY
Payer: MEDICARE

## 2024-06-28 PROCEDURE — 77014 PR CT GUIDE FOR PLACEMENT RADIATION THERAPY FIELDS: CPT | Mod: 26 | Performed by: RADIOLOGY

## 2024-07-01 ENCOUNTER — APPOINTMENT (OUTPATIENT)
Dept: CT IMAGING | Facility: CLINIC | Age: 49
End: 2024-07-01
Attending: EMERGENCY MEDICINE
Payer: MEDICARE

## 2024-07-01 ENCOUNTER — APPOINTMENT (OUTPATIENT)
Dept: CT IMAGING | Facility: CLINIC | Age: 49
End: 2024-07-01
Payer: MEDICARE

## 2024-07-01 ENCOUNTER — APPOINTMENT (OUTPATIENT)
Dept: CARDIOLOGY | Facility: CLINIC | Age: 49
End: 2024-07-01
Attending: EMERGENCY MEDICINE
Payer: MEDICARE

## 2024-07-01 ENCOUNTER — APPOINTMENT (OUTPATIENT)
Dept: MRI IMAGING | Facility: CLINIC | Age: 49
End: 2024-07-01
Attending: EMERGENCY MEDICINE
Payer: MEDICARE

## 2024-07-01 ENCOUNTER — OFFICE VISIT (OUTPATIENT)
Dept: RADIATION ONCOLOGY | Facility: CLINIC | Age: 49
End: 2024-07-01
Attending: RADIOLOGY
Payer: MEDICARE

## 2024-07-01 ENCOUNTER — HOSPITAL ENCOUNTER (EMERGENCY)
Facility: CLINIC | Age: 49
Discharge: HOME | End: 2024-07-01
Attending: EMERGENCY MEDICINE | Admitting: EMERGENCY MEDICINE
Payer: MEDICARE

## 2024-07-01 VITALS
RESPIRATION RATE: 28 BRPM | WEIGHT: 240 LBS | HEART RATE: 62 BPM | SYSTOLIC BLOOD PRESSURE: 128 MMHG | DIASTOLIC BLOOD PRESSURE: 93 MMHG | OXYGEN SATURATION: 100 % | TEMPERATURE: 98.2 F | BODY MASS INDEX: 32.51 KG/M2 | HEIGHT: 72 IN

## 2024-07-01 VITALS
HEART RATE: 82 BPM | SYSTOLIC BLOOD PRESSURE: 118 MMHG | WEIGHT: 240 LBS | BODY MASS INDEX: 33.47 KG/M2 | DIASTOLIC BLOOD PRESSURE: 74 MMHG

## 2024-07-01 DIAGNOSIS — I26.99 ACUTE PULMONARY EMBOLISM WITHOUT ACUTE COR PULMONALE, UNSPECIFIED PULMONARY EMBOLISM TYPE (H): ICD-10-CM

## 2024-07-01 DIAGNOSIS — C79.51 METASTASIS TO SPINAL COLUMN (H): Primary | ICD-10-CM

## 2024-07-01 DIAGNOSIS — R53.1 RIGHT SIDED WEAKNESS: ICD-10-CM

## 2024-07-01 LAB
ALBUMIN SERPL BCG-MCNC: 4 G/DL (ref 3.5–5.2)
ALP SERPL-CCNC: 92 U/L (ref 40–150)
ALT SERPL W P-5'-P-CCNC: 15 U/L (ref 0–50)
ANION GAP SERPL CALCULATED.3IONS-SCNC: 10 MMOL/L (ref 7–15)
ANION GAP SERPL CALCULATED.3IONS-SCNC: 12 MMOL/L (ref 7–15)
APTT PPP: 25 SECONDS (ref 22–38)
AST SERPL W P-5'-P-CCNC: 20 U/L (ref 0–45)
ATRIAL RATE - MUSE: 70 BPM
BASOPHILS # BLD AUTO: 0 10E3/UL (ref 0–0.2)
BASOPHILS NFR BLD AUTO: 1 %
BILIRUB SERPL-MCNC: 0.3 MG/DL
BUN SERPL-MCNC: 12.2 MG/DL (ref 6–20)
BUN SERPL-MCNC: 12.4 MG/DL (ref 6–20)
CALCIUM SERPL-MCNC: 9.6 MG/DL (ref 8.6–10)
CALCIUM SERPL-MCNC: 9.6 MG/DL (ref 8.6–10)
CHLORIDE SERPL-SCNC: 104 MMOL/L (ref 98–107)
CHLORIDE SERPL-SCNC: 104 MMOL/L (ref 98–107)
CREAT BLD-MCNC: 1.4 MG/DL (ref 0.5–1)
CREAT SERPL-MCNC: 1.34 MG/DL (ref 0.51–0.95)
CREAT SERPL-MCNC: 1.36 MG/DL (ref 0.51–0.95)
DEPRECATED HCO3 PLAS-SCNC: 25 MMOL/L (ref 22–29)
DEPRECATED HCO3 PLAS-SCNC: 26 MMOL/L (ref 22–29)
DIASTOLIC BLOOD PRESSURE - MUSE: NORMAL MMHG
EGFRCR SERPLBLD CKD-EPI 2021: 46 ML/MIN/1.73M2
EGFRCR SERPLBLD CKD-EPI 2021: 48 ML/MIN/1.73M2
EGFRCR SERPLBLD CKD-EPI 2021: 49 ML/MIN/1.73M2
EOSINOPHIL # BLD AUTO: 0.1 10E3/UL (ref 0–0.7)
EOSINOPHIL NFR BLD AUTO: 1 %
ERYTHROCYTE [DISTWIDTH] IN BLOOD BY AUTOMATED COUNT: 13.2 % (ref 10–15)
GLUCOSE BLDC GLUCOMTR-MCNC: 180 MG/DL (ref 70–99)
GLUCOSE SERPL-MCNC: 143 MG/DL (ref 70–99)
GLUCOSE SERPL-MCNC: 162 MG/DL (ref 70–99)
HCG SERPL QL: NEGATIVE
HCT VFR BLD AUTO: 31.9 % (ref 35–47)
HGB BLD-MCNC: 10.1 G/DL (ref 11.7–15.7)
HOLD SPECIMEN: NORMAL
IMM GRANULOCYTES # BLD: 0 10E3/UL
IMM GRANULOCYTES NFR BLD: 1 %
INR PPP: 1.11 (ref 0.85–1.15)
INTERPRETATION ECG - MUSE: NORMAL
LVEF ECHO: NORMAL
LYMPHOCYTES # BLD AUTO: 0.9 10E3/UL (ref 0.8–5.3)
LYMPHOCYTES NFR BLD AUTO: 21 %
MAGNESIUM SERPL-MCNC: 1.8 MG/DL (ref 1.7–2.3)
MCH RBC QN AUTO: 34.6 PG (ref 26.5–33)
MCHC RBC AUTO-ENTMCNC: 31.7 G/DL (ref 31.5–36.5)
MCV RBC AUTO: 109 FL (ref 78–100)
MONOCYTES # BLD AUTO: 0.2 10E3/UL (ref 0–1.3)
MONOCYTES NFR BLD AUTO: 5 %
NEUTROPHILS # BLD AUTO: 3.1 10E3/UL (ref 1.6–8.3)
NEUTROPHILS NFR BLD AUTO: 71 %
NRBC # BLD AUTO: 0 10E3/UL
NRBC BLD AUTO-RTO: 0 /100
P AXIS - MUSE: 48 DEGREES
PLATELET # BLD AUTO: 167 10E3/UL (ref 150–450)
POTASSIUM SERPL-SCNC: 4 MMOL/L (ref 3.4–5.3)
POTASSIUM SERPL-SCNC: 4.1 MMOL/L (ref 3.4–5.3)
PR INTERVAL - MUSE: 176 MS
PROT SERPL-MCNC: 7.1 G/DL (ref 6.4–8.3)
QRS DURATION - MUSE: 86 MS
QT - MUSE: 386 MS
QTC - MUSE: 416 MS
R AXIS - MUSE: 13 DEGREES
RADIOLOGIST FLAGS: ABNORMAL
RADIOLOGIST FLAGS: ABNORMAL
RBC # BLD AUTO: 2.92 10E6/UL (ref 3.8–5.2)
SODIUM SERPL-SCNC: 140 MMOL/L (ref 135–145)
SODIUM SERPL-SCNC: 141 MMOL/L (ref 135–145)
SYSTOLIC BLOOD PRESSURE - MUSE: NORMAL MMHG
T AXIS - MUSE: 52 DEGREES
TROPONIN T SERPL HS-MCNC: 6 NG/L
VENTRICULAR RATE- MUSE: 70 BPM
WBC # BLD AUTO: 4.4 10E3/UL (ref 4–11)

## 2024-07-01 PROCEDURE — G1010 CDSM STANSON: HCPCS | Performed by: RADIOLOGY

## 2024-07-01 PROCEDURE — 70498 CT ANGIOGRAPHY NECK: CPT | Mod: 26 | Performed by: RADIOLOGY

## 2024-07-01 PROCEDURE — 77386 HC IMRT TREATMENT DELIVERY, COMPLEX: CPT | Performed by: RADIOLOGY

## 2024-07-01 PROCEDURE — 258N000003 HC RX IP 258 OP 636: Performed by: EMERGENCY MEDICINE

## 2024-07-01 PROCEDURE — G1010 CDSM STANSON: HCPCS

## 2024-07-01 PROCEDURE — A9585 GADOBUTROL INJECTION: HCPCS | Performed by: EMERGENCY MEDICINE

## 2024-07-01 PROCEDURE — 250N000009 HC RX 250: Performed by: EMERGENCY MEDICINE

## 2024-07-01 PROCEDURE — 999N000285 HC STATISTIC VASC ACCESS LAB DRAW WITH PIV START

## 2024-07-01 PROCEDURE — 75572 CT HRT W/3D IMAGE: CPT | Mod: MG

## 2024-07-01 PROCEDURE — 255N000002 HC RX 255 OP 636: Performed by: INTERNAL MEDICINE

## 2024-07-01 PROCEDURE — 250N000013 HC RX MED GY IP 250 OP 250 PS 637: Performed by: EMERGENCY MEDICINE

## 2024-07-01 PROCEDURE — 70450 CT HEAD/BRAIN W/O DYE: CPT | Mod: MG

## 2024-07-01 PROCEDURE — 255N000002 HC RX 255 OP 636: Performed by: EMERGENCY MEDICINE

## 2024-07-01 PROCEDURE — 70496 CT ANGIOGRAPHY HEAD: CPT | Mod: MG

## 2024-07-01 PROCEDURE — 96360 HYDRATION IV INFUSION INIT: CPT | Performed by: EMERGENCY MEDICINE

## 2024-07-01 PROCEDURE — G1010 CDSM STANSON: HCPCS | Performed by: INTERNAL MEDICINE

## 2024-07-01 PROCEDURE — 80053 COMPREHEN METABOLIC PANEL: CPT | Performed by: RADIOLOGY

## 2024-07-01 PROCEDURE — 84703 CHORIONIC GONADOTROPIN ASSAY: CPT | Performed by: EMERGENCY MEDICINE

## 2024-07-01 PROCEDURE — 93010 ELECTROCARDIOGRAM REPORT: CPT | Performed by: EMERGENCY MEDICINE

## 2024-07-01 PROCEDURE — 999N000248 HC STATISTIC IV INSERT WITH US BY RN

## 2024-07-01 PROCEDURE — 75572 CT HRT W/3D IMAGE: CPT | Mod: 26 | Performed by: INTERNAL MEDICINE

## 2024-07-01 PROCEDURE — 999N000208 ECHOCARDIOGRAM COMPLETE

## 2024-07-01 PROCEDURE — 84484 ASSAY OF TROPONIN QUANT: CPT | Performed by: EMERGENCY MEDICINE

## 2024-07-01 PROCEDURE — 71275 CT ANGIOGRAPHY CHEST: CPT | Mod: 26 | Performed by: RADIOLOGY

## 2024-07-01 PROCEDURE — 71275 CT ANGIOGRAPHY CHEST: CPT | Mod: MF

## 2024-07-01 PROCEDURE — 85610 PROTHROMBIN TIME: CPT | Performed by: EMERGENCY MEDICINE

## 2024-07-01 PROCEDURE — 85730 THROMBOPLASTIN TIME PARTIAL: CPT | Performed by: EMERGENCY MEDICINE

## 2024-07-01 PROCEDURE — 85004 AUTOMATED DIFF WBC COUNT: CPT | Performed by: EMERGENCY MEDICINE

## 2024-07-01 PROCEDURE — 83735 ASSAY OF MAGNESIUM: CPT | Performed by: RADIOLOGY

## 2024-07-01 PROCEDURE — 70553 MRI BRAIN STEM W/O & W/DYE: CPT | Mod: 26 | Performed by: RADIOLOGY

## 2024-07-01 PROCEDURE — 999N000175 HC STATISTIC STROKE CODE W/ ACCESS

## 2024-07-01 PROCEDURE — 70496 CT ANGIOGRAPHY HEAD: CPT | Mod: 26 | Performed by: RADIOLOGY

## 2024-07-01 PROCEDURE — 82962 GLUCOSE BLOOD TEST: CPT

## 2024-07-01 PROCEDURE — 99291 CRITICAL CARE FIRST HOUR: CPT | Performed by: EMERGENCY MEDICINE

## 2024-07-01 PROCEDURE — 250N000011 HC RX IP 250 OP 636: Performed by: EMERGENCY MEDICINE

## 2024-07-01 PROCEDURE — 93005 ELECTROCARDIOGRAM TRACING: CPT | Performed by: EMERGENCY MEDICINE

## 2024-07-01 PROCEDURE — 36415 COLL VENOUS BLD VENIPUNCTURE: CPT | Performed by: RADIOLOGY

## 2024-07-01 PROCEDURE — 70553 MRI BRAIN STEM W/O & W/DYE: CPT | Mod: MG

## 2024-07-01 PROCEDURE — 82565 ASSAY OF CREATININE: CPT

## 2024-07-01 PROCEDURE — 99291 CRITICAL CARE FIRST HOUR: CPT | Mod: GC | Performed by: STUDENT IN AN ORGANIZED HEALTH CARE EDUCATION/TRAINING PROGRAM

## 2024-07-01 PROCEDURE — 93306 TTE W/DOPPLER COMPLETE: CPT | Mod: 26 | Performed by: INTERNAL MEDICINE

## 2024-07-01 PROCEDURE — 99291 CRITICAL CARE FIRST HOUR: CPT | Mod: 25 | Performed by: EMERGENCY MEDICINE

## 2024-07-01 RX ORDER — OXYCODONE HYDROCHLORIDE 10 MG/1
10 TABLET ORAL ONCE
Status: COMPLETED | OUTPATIENT
Start: 2024-07-01 | End: 2024-07-01

## 2024-07-01 RX ORDER — DIPHENHYDRAMINE HYDROCHLORIDE 50 MG/ML
25 INJECTION INTRAMUSCULAR; INTRAVENOUS ONCE
Status: COMPLETED | OUTPATIENT
Start: 2024-07-01 | End: 2024-07-01

## 2024-07-01 RX ORDER — IOPAMIDOL 755 MG/ML
117 INJECTION, SOLUTION INTRAVASCULAR ONCE
Status: COMPLETED | OUTPATIENT
Start: 2024-07-01 | End: 2024-07-01

## 2024-07-01 RX ORDER — GADOBUTROL 604.72 MG/ML
10 INJECTION INTRAVENOUS ONCE
Status: COMPLETED | OUTPATIENT
Start: 2024-07-01 | End: 2024-07-01

## 2024-07-01 RX ORDER — ONDANSETRON 2 MG/ML
4 INJECTION INTRAMUSCULAR; INTRAVENOUS ONCE
Status: COMPLETED | OUTPATIENT
Start: 2024-07-01 | End: 2024-07-01

## 2024-07-01 RX ADMIN — HUMAN ALBUMIN MICROSPHERES AND PERFLUTREN 5 ML: 10; .22 INJECTION, SOLUTION INTRAVENOUS at 14:42

## 2024-07-01 RX ADMIN — OXYCODONE HYDROCHLORIDE 10 MG: 10 TABLET ORAL at 13:30

## 2024-07-01 RX ADMIN — SODIUM CHLORIDE 1000 ML: 9 INJECTION, SOLUTION INTRAVENOUS at 13:30

## 2024-07-01 RX ADMIN — GADOBUTROL 10 ML: 604.72 INJECTION INTRAVENOUS at 17:12

## 2024-07-01 RX ADMIN — OXYCODONE HYDROCHLORIDE 10 MG: 10 TABLET ORAL at 17:47

## 2024-07-01 RX ADMIN — SODIUM CHLORIDE, PRESERVATIVE FREE 90 ML: 5 INJECTION INTRAVENOUS at 12:59

## 2024-07-01 RX ADMIN — IOPAMIDOL 90 ML: 755 INJECTION, SOLUTION INTRAVENOUS at 13:00

## 2024-07-01 ASSESSMENT — COLUMBIA-SUICIDE SEVERITY RATING SCALE - C-SSRS
1. IN THE PAST MONTH, HAVE YOU WISHED YOU WERE DEAD OR WISHED YOU COULD GO TO SLEEP AND NOT WAKE UP?: NO
2. HAVE YOU ACTUALLY HAD ANY THOUGHTS OF KILLING YOURSELF IN THE PAST MONTH?: NO
6. HAVE YOU EVER DONE ANYTHING, STARTED TO DO ANYTHING, OR PREPARED TO DO ANYTHING TO END YOUR LIFE?: NO

## 2024-07-01 ASSESSMENT — ACTIVITIES OF DAILY LIVING (ADL)
ADLS_ACUITY_SCORE: 40

## 2024-07-01 NOTE — ED PROVIDER NOTES
ED Provider Note  [facility]      History     Chief Complaint   Patient presents with    Dizziness     HPI  Teresa Sanderson is a 48 year old female with a history of pulmonary embolism, breast cancer, DVT who presents to the ED for right-sided weakness and difficulty ambulating.  Patient states that symptoms began at 8:00 this morning.  She states she went to bed around midnight.  She denies any headache.  No photophobia.  No diplopia.  She denies any chest pain or dyspnea.  No abdominal pain.  She is on Xarelto for history of pulmonary embolism.  She states she last took it yesterday.  However, the patient does admit that she misses her medication doses frequently.  She states that over the past week probably she is only taking her Xarelto probably 3 times.  She states that she is probably only taken 6 times over the past month.  She is complaining of some pain in her right flank region but denies any pleuritic component.  She denies any dyspnea.    Past Medical History  Past Medical History:   Diagnosis Date    Anxiety     Breast CA (H) 12/2020    Depression     DVT (deep venous thrombosis) (H) 2014    Left breast mass     x approximately 4-5 months    Pulmonary emboli (H)     Pyelonephritis     Schizoaffective disorder (H)     Tobacco use      Past Surgical History:   Procedure Laterality Date    COLONOSCOPY N/A 7/8/2022    Procedure: COLONOSCOPY, WITH POLYPECTOMY;  Surgeon: Ham Cano MD;  [place] [facility] [place]    IR LUMBAR KYPHOPLASTY VERTEBRAE  5/17/2021    LAPAROSCOPIC SALPINGO-OOPHORECTOMY Bilateral 8/20/2021    Procedure: BILATERAL SALPINGO-OOPHORECTOMY, LAPAROSCOPIC;  Surgeon: Rory Lopez MD;  [place] [facility] [place]    TUBAL LIGATION  1998     [START ON 7/4/2024] abemaciclib (VERZENIO) 100 MG tablet  abemaciclib (VERZENIO) 100 MG tablet  exemestane (AROMASIN) 25 MG tablet  gabapentin (NEURONTIN) 300 MG capsule  loperamide (IMODIUM) 2 MG capsule  methocarbamol  (ROBAXIN) 500 MG tablet  methylPREDNISolone (MEDROL) 32 MG tablet  methylPREDNISolone (MEDROL) 32 MG tablet  naloxone (NARCAN) 4 MG/0.1ML nasal spray  ondansetron (ZOFRAN) 8 MG tablet  oxyCODONE (ROXICODONE) 5 MG tablet  pantoprazole (PROTONIX) 40 MG EC tablet  polyethylene glycol (MIRALAX) 17 GM/Dose powder  prochlorperazine (COMPAZINE) 10 MG tablet  prochlorperazine (COMPAZINE) 10 MG tablet  QUEtiapine (SEROQUEL) 400 MG tablet  QUEtiapine (SEROQUEL) 50 MG tablet  rivaroxaban ANTICOAGULANT (XARELTO) 20 MG TABS tablet  SENNA-docusate sodium (SENNA S) 8.6-50 MG tablet  sertraline (ZOLOFT) 50 MG tablet  Vitamin D3 (CHOLECALCIFEROL) 25 mcg (1000 units) tablet      Allergies   Allergen Reactions    Contrast Dye      Pt developed nausea after isovue 370 injection on 6/9/21      Family History  Family History   Problem Relation Age of Onset    Lung Cancer Mother     Lung Cancer Father     Lupus Brother     Kidney Disease Brother     Diabetes Daughter     Asthma Son     Cardiovascular Maternal Aunt     Hypertension Other     Diabetes Other     Deep Vein Thrombosis (DVT) No family hx of      Social History   Social History     Tobacco Use    Smoking status: Some Days     Current packs/day: 0.25     Average packs/day: 0.3 packs/day for 13.1 years (3.3 ttl pk-yrs)     Types: Cigarettes     Start date: 5/13/2011     Passive exposure: Current    Smokeless tobacco: Never   Vaping Use    Vaping status: Never Used   Substance Use Topics    Alcohol use: Not Currently     Comment: occ    Drug use: Yes     Types: Marijuana     Comment: occ      A medically appropriate review of systems was performed with pertinent positives and negatives noted in the HPI, and all other systems negative.    Physical Exam   BP: 116/68  Pulse: 68  Temp: 98.2  F (36.8  C)  Resp: 18  Height: 182.9 cm (6')  Weight: 108.9 kg (240 lb)  SpO2: 98 %  Physical Exam  Vitals and nursing note reviewed.   Constitutional:       General: She is not in acute  distress.     Appearance: Normal appearance. She is not diaphoretic.   HENT:      Head: Normocephalic and atraumatic.   Eyes:      Extraocular Movements: Extraocular movements intact.      Conjunctiva/sclera: Conjunctivae normal.   Cardiovascular:      Rate and Rhythm: Normal rate.      Heart sounds: Normal heart sounds.   Pulmonary:      Effort: Pulmonary effort is normal. No respiratory distress.      Breath sounds: Normal breath sounds.   Abdominal:      Palpations: Abdomen is soft.      Tenderness: There is no abdominal tenderness.   Musculoskeletal:         General: No tenderness. Normal range of motion.      Cervical back: Normal range of motion and neck supple.   Skin:     General: Skin is warm.      Findings: No rash.   Neurological:      Mental Status: She is alert.           ED Course, Procedures, & Data           EKG Interpretation:      Interpreted by UMM GRESHAM MD, MD  Time reviewed:1234   Symptoms at time of EKG: right-sided weakness   Rhythm: normal sinus   Rate: normal  Axis: NORMAL  Ectopy: none  Conduction: normal  ST Segments/ T Waves: No ST-T wave changes  Q Waves: none  Comparison to prior: No old EKG available    Clinical Impression: normal EKG     Procedures       The patient has stroke symptoms:         ED Stroke specific documentation           NIHSS PDF     Patient last known well time: 0000  ED Provider first to bedside at: 1231  CT Results received at: 1325    Thrombolytics:   Not given due to:   - minor/isolated/quickly resolving symptoms  - DOAC dose within 48 hours or INR > 1.7    If treating with thrombolytics: Ensure SBP<180 and DBP<105 prior to treatment with thrombolytics.  Administering thrombolytics after treatment with IV labetalol, hydralazine, or nicardipine is reasonable once BP control is established.    Endovascular Retrieval:  Not initiated due to absence of proximal vessel occlusion    National Institutes of [employer] Stroke Scale (Baseline)  Time Performed: 1231     Score     Level of consciousness: (0)   Alert, keenly responsive    LOC questions: (0)   Answers both questions correctly    LOC commands: (0)   Performs both tasks correctly    Best gaze: (0)   Normal    Visual: (0)   No visual loss    Facial palsy: (1)   Minor paralysis (flat nasolabial fold, smile asymmetry)    Motor arm (left): (0)   No drift    Motor arm (right): (0)   No drift    Motor leg (left): (0)   No drift    Motor leg (right): (0)   No drift    Limb ataxia: (0)   Absent    Sensory: (0)   Normal- no sensory loss    Best language: (0)   Normal- no aphasia    Dysarthria: (0)   Normal    Extinction and inattention: (0)   No abnormality        Total Score:  1        Stroke Mimics were considered (including migraine headache, seizure disorder, hypoglycemia (or hyperglycemia), head or spinal trauma, CNS infection, Toxin ingestion and shock state (e.g. sepsis) .                  Results for orders placed or performed during the hospital encounter of 07/01/24   CT Head w/o Contrast     Status: None    Narrative    CT HEAD W/O CONTRAST 7/1/2024 1:09 PM    History:  Code Stroke to evaluate for potential thrombolysis and  thrombectomy. PLEASE READ IMMEDIATELY.     Comparison: MR brain 1/26/2023    Technique: Using multidetector thin collimation helical acquisition  technique, axial, coronal and sagittal CT images from the skull base  to the vertex were obtained without intravenous contrast.     Findings: There is no intracranial hemorrhage, mass effect or midline  shift. There is no focal loss of gray-white matter differentiation,  insular ribbon sign, or focally hyperdense artery to suggest acute  infarction. The ventricles are proportionate to the cerebral sulci.    The bony calvaria and the bones of the skull base appear normal. The  visualized portions of the paranasal sinuses and mastoid air cells are  unremarkable.       Impression    Impression:   1. No acute intracranial hemorrhage.  2. ASPECT Score:  10/10.    [Stroke Code Result: Negative]    Finding was identified on 7/1/2024 1:11 PM.     Brodie Jha MD was contacted by me on 7/1/2024 1:21 PM and verbalized  understanding of the result.    [facility] Stroke Code Communication Guidelines:  [place] ED: [phone] (EB ED)  [place] Inpatient: [phone] (Resident Pager)   or Felipe 'Stroke First Call Resident [[place]]' or Amcom [phone]  [facility] [place] ED: [phone] ([facility] ED)  [facility] Inpatient: Page [phone]    I have personally reviewed the examination and initial interpretation  and I agree with the findings.    NASIR LONG MD         SYSTEM ID:  [id number]   CTA Head Neck with Contrast     Status: Abnormal   Result Value Ref Range    Radiologist flags Pulmonary embolism (Urgent)     Narrative    EXAM: CTA HEAD NECK W CONTRAST  7/1/2024 1:10 PM     HISTORY:  Code Stroke to evaluate for potential thrombolysis and  thrombectomy. PLEASE READ IMMEDIATELY.       COMPARISON:  Same day head CT 7/1/2024, MR brain 1/26/2023    TECHNIQUE:    HEAD and NECK CTA: During rapid bolus intravenous injection of  nonionic contrast material, axial images were obtained using thin  collimation multidetector helical technique from the base of the upper  aortic arch through the [place]. This CT angiogram data was  reconstructed at thin intervals with mild overlap. Images were sent to  the 3D workstation, and 3D reconstructions were obtained. The axial  source images, multiplanar reformations, 3D reconstructions in both  maximum intensity projection display and volume rendered models were  reviewed, with reconstructions performed by the technologist.    FINDINGS:  Head CTA demonstrates no intracranial arterial aneurysm or stenosis.    Neck CTA demonstrates conventional aortic arch branching pattern and  patent great vessel origins. No internal carotid artery stenosis. No  vertebral artery stenosis.    Multifocal filling defects are seen throughout the right hemithorax,  for  example within the segmental and subsegmental pulmonary arteries  involving the right upper lobe (series 7 images 918, [phone]), not  definitively seen on 6/6/2024. The majority of the filling defects  appear to be acute, however there is a small degree of clot burden may  be adherent to the walls of the pulmonary artery as evidenced within  the right lower lobe (series 7 image 1006. No pericardial effusion. No  definite evidence of paradoxical bowing of the interventricular  septum. No reflux of contrast in the IVC, however limited on this  nondedicated examination.    Hypoattenuating nodule within the right lobe of thyroid, stable to  decreased in size compared to PET CT 6/6/2024.      Impression    IMPRESSION:   1. Multifocal segmental and subsegmental pulmonary emboli throughout  the right hemithorax, with the majority of the clot burden likely  acute. No significant right-sided heart strain.  2. Head CTA demonstrates no aneurysm or stenosis of the major  intracranial arteries.   3. Neck CTA demonstrates no stenosis of the major cervical arteries.    [Urgent Result: Pulmonary embolism]    Finding was identified on 7/1/2024 1:20PM.     Brodie Jha MD was contacted by Dr. Benitez at 7/1/2024 1:21 PM and  verbalized understanding of the urgent finding.     I have personally reviewed the examination and initial interpretation  and I agree with the findings.    NASIR LONG MD         SYSTEM ID:  [id number]   Radiologist Consult For Cardiology     Status: Abnormal   Result Value Ref Range    Radiologist flags Pulmonary emboli (Urgent)     Narrative    EXAMINATION: RADIOLOGIST CONSULT FOR CARDIOLOGY, 7/1/2024 1:10 PM     COMPARISON: PET CT 6/6/2024, 12/11/2023, same-day CT neck, 10/20/2023    HISTORY: Radiology consult for cardiology.    TECHNIQUE: Limited field of view CT images. Please refer to separate  dictation for the cardiology portion of the study.    FINDINGS:   Lungs: Central airways are patent.  Unchanged 3 mm groundglass nodule  in the lateral left upper lobe (5/3). No suspicious pulmonary nodules  or masses. No focal airspace consolidation. No pleural effusions. No  pneumothorax.    Mediastinum: No enlarged mediastinal lymph nodes by short axis  criteria. The heart is not enlarged. No pericardial effusion.  Pulmonary emboli involving the segmental branch to the lateral right  upper lobe and the right lower lobar branch. Main pulmonary artery is  mildly dilated measuring up to 3.3 cm. No pericardial effusion. No  right ventricle enlargement. No bowing of the interventricular septum.  No reflux of contrast into the IVC. The esophagus appears  unremarkable.    Upper abdomen: Visualized portions of the upper abdomen are  unremarkable.    Bones/soft tissue: Degenerative changes of the spine. Increased  sclerosis of the T7 vertebral body compared to 10/20/2023. Otherwise,  no acute or suspicious osseous abnormality.        Impression    IMPRESSION:  1. Pulmonary emboli involving the right lower lobar branch and  segmental branch to the lateral right upper lobe. No evidence of right  heart strain.  2. Main pulmonary artery is mildly dilated at 3.3 cm, which can be  seen in the setting of pulmonary hypertension.  3. Please refer to separate cardiology report for further information.      [Urgent Result: Pulmonary emboli]    Finding was identified on 7/1/2024 1:40 PM.     Dr. Terrell was contacted by Dr. Parson at 7/1/2024 1:54 PM and  verbalized understanding of the urgent finding.      I have personally reviewed the examination and initial interpretation  and I agree with the findings.    RIK TOWNSEND MD         SYSTEM ID:  [id number]   CT Chest Pulmonary Embolism w Contrast     Status: None    Narrative    CT pulmonary angiogram with contrast    INDICATION: Right-sided weakness.    CONTRAST: 90 mL of intravenous Isovue-370    COMPARISON: Chest CT coronary angiogram earlier today    FINDINGS: Right thyroid  lobe nodule approximately 15 mm in diameter.  No lymphadenopathy. Heart size normal. Thoracic aortic caliber normal.  Pulmonary artery caliber approximately normal 2.5 cm.  No definite left-sided pulmonary embolus. However, correlating with  the recent CT, there are are hypodense filling defects in the right  upper lobe are as well as lower lobar.  No pleural or pericardial effusion. No enlarged lymph nodes in the  chest. Surgical clips left breast. History of breast cancer.  The included upper abdomen in the pulmonary arterial phase is not very  sensitive for any lesion detection. No obvious abnormal findings  noted.  Bone detail shows patchy sclerotic findings in the lower cervical  spine as well as the right side left T7 as well as much of the T9 and  T11 vertebral bodies prominent T7 and subtle T6 peripherally sclerotic  densities as well, concerning for metastatic disease.    Detail of the lungs shows mild dependent atelectasis lung bases.  Mosaic attenuation of the lungs which could be related to inspiratory  technique on pulmonary angiogram scanning. The groundglass/mosaicism  it is more convincing internal lobes within the upper lobes.  Subsegmental atelectasis laterally in the right lower lobe. 1-2 mm  lateral left upper lobe nodule (2/76) unchanged from prior CTA  pulmonary angiogram 8/10/2021. Therefore this should be benign.      Impression    IMPRESSION: Pulmonary emboli as diagnosed earlier today. No evidence  of right heart strain. Surgical clips again left breast with history  of breast cancer. Sclerotic bone lesions in the thoracic spine  suggestive of metastatic disease    RIK TOWNSEND MD         SYSTEM ID:  [id number]   Basic metabolic panel     Status: Abnormal   Result Value Ref Range    Sodium 140 135 - 145 mmol/L    Potassium 4.1 3.4 - 5.3 mmol/L    Chloride 104 98 - 107 mmol/L    Carbon Dioxide (CO2) 26 22 - 29 mmol/L    Anion Gap 10 7 - 15 mmol/L    Urea Nitrogen 12.2 6.0 - 20.0 mg/dL     Creatinine 1.34 (H) 0.51 - 0.95 mg/dL    GFR Estimate 49 (L) >60 mL/min/1.73m2    Calcium 9.6 8.6 - 10.0 mg/dL    Glucose 162 (H) 70 - 99 mg/dL   INR     Status: Normal   Result Value Ref Range    INR 1.11 0.85 - 1.15   Partial thromboplastin time     Status: Normal   Result Value Ref Range    aPTT 25 22 - 38 Seconds   Troponin T, High Sensitivity     Status: Normal   Result Value Ref Range    Troponin T, High Sensitivity 6 <=14 ng/L   hCG Qualitative Pregnancy     Status: Normal   Result Value Ref Range    hCG Serum Qualitative Negative Negative   Creatinine POCT     Status: Abnormal   Result Value Ref Range    Creatinine POCT 1.4 (H) 0.5 - 1.0 mg/dL    GFR, ESTIMATED POCT 46 (L) >60 mL/min/1.73m2   Glucose by meter     Status: Abnormal   Result Value Ref Range    GLUCOSE BY METER POCT 180 (H) 70 - 99 mg/dL   CBC with platelets and differential     Status: Abnormal   Result Value Ref Range    WBC Count 4.4 4.0 - 11.0 10e3/uL    RBC Count 2.92 (L) 3.80 - 5.20 10e6/uL    Hemoglobin 10.1 (L) 11.7 - 15.7 g/dL    Hematocrit 31.9 (L) 35.0 - 47.0 %     (H) 78 - 100 fL    MCH 34.6 (H) 26.5 - 33.0 pg    MCHC 31.7 31.5 - 36.5 g/dL    RDW 13.2 10.0 - 15.0 %    Platelet Count 167 150 - 450 10e3/uL    % Neutrophils 71 %    % Lymphocytes 21 %    % Monocytes 5 %    % Eosinophils 1 %    % Basophils 1 %    % Immature Granulocytes 1 %    NRBCs per 100 WBC 0 <1 /100    Absolute Neutrophils 3.1 1.6 - 8.3 10e3/uL    Absolute Lymphocytes 0.9 0.8 - 5.3 10e3/uL    Absolute Monocytes 0.2 0.0 - 1.3 10e3/uL    Absolute Eosinophils 0.1 0.0 - 0.7 10e3/uL    Absolute Basophils 0.0 0.0 - 0.2 10e3/uL    Absolute Immature Granulocytes 0.0 <=0.4 10e3/uL    Absolute NRBCs 0.0 10e3/uL   Extra Tube ([place] Draw)     Status: None    Narrative    The following orders were created for panel order Extra Tube ([place] Draw).  Procedure                               Abnormality         Status                     ---------                                -----------         ------                     Extra Green Top (Lithium...[[id number]]                      Final result                 Please view results for these tests on the individual orders.   Extra Green Top (Lithium Heparin) Tube     Status: None   Result Value Ref Range    Hold Specimen JIC    EKG 12-lead, tracing only     Status: None   Result Value Ref Range    Systolic Blood Pressure  mmHg    Diastolic Blood Pressure  mmHg    Ventricular Rate 70 BPM    Atrial Rate 70 BPM    OR Interval 176 ms    QRS Duration 86 ms     ms    QTc 416 ms    P Axis 48 degrees    R AXIS 13 degrees    T Axis 52 degrees    Interpretation ECG       Sinus rhythm  Normal ECG  Unconfirmed report - interpretation of this ECG is computer generated - see medical record for final interpretation  Confirmed by - EMERGENCY ROOM, PHYSICIAN ([id number]),  DENIA VALENCIA ([id number]) on 2024 2:15:03 PM     Echocardiogram Complete     Status: None   Result Value Ref Range    LVEF  60-65%     Narrative    [id number]  [id number]  [id number]  [id number]^MP^UMM     [facility]  Echocardiography Laboratory  [address] [place] [id number]     Name: RYANN MANN  MRN: [id number]  : 1975  Study Date: 2024 01:50 PM  Age: 48 yrs  Gender: Female  Patient Location: [facility]  Reason For Study: Pulmonary Embolism  Ordering Physician: UMM GRESHAM  Performed By: Jahaira Santillan RDCS     BSA: 2.3 m2  Height: 72 in  Weight: 240 lb  HR: 56  BP: 116/68 mmHg  ______________________________________________________________________________  Procedure  Complete Portable Echo Adult. Contrast Optison. Poor acoustic windows. Optison  (NDC #5374-0617-99) given intravenously. Patient was given 5 ml mixture of 3  ml Optison and 6 ml saline. 4 ml wasted.  ______________________________________________________________________________  Interpretation Summary  Global and regional left ventricular function  is normal with an EF of 60-65%.  Right ventricular function, chamber size, wall motion, and thickness are  normal.  Pulmonary artery systolic pressure is normal.  The inferior vena cava is normal.  No pericardial effusion is present.  There is no prior study for direct comparison.  ______________________________________________________________________________  Left Ventricle  Global and regional left ventricular function is normal with an EF of 60-65%.  Left ventricular wall thickness is normal. Left ventricular size is normal.  Left ventricular diastolic function is normal. No regional wall motion  abnormalities are seen.     Right Ventricle  Right ventricular function, chamber size, wall motion, and thickness are  normal.     Atria  Both atria appear normal. The atrial septum is intact as assessed by color  Doppler .     Mitral Valve  The mitral valve is normal. Trace mitral insufficiency is present.     Aortic Valve  Aortic valve is normal in structure and function.     Tricuspid Valve  The tricuspid valve is normal. Trace tricuspid insufficiency is present. The  right ventricular systolic pressure is approximated at 25.2 mmHg plus the  right atrial pressure. Pulmonary artery systolic pressure is normal.     Pulmonic Valve  The pulmonic valve is normal.     Vessels  The aorta root is normal. The thoracic aorta is normal. The pulmonary artery  and bifurcation cannot be assessed. The inferior vena cava is normal.     Pericardium  No pericardial effusion is present.     Compared to Previous Study  There is no prior study for direct comparison.  ______________________________________________________________________________  MMode/2D Measurements & Calculations     IVSd: 1.1 cm  LVIDd: 4.7 cm  LVIDs: 3.0 cm  LVPWd: 1.1 cm  FS: 36.7 %  LV mass(C)d: 188.8 grams  LV mass(C)dI: 82.0 grams/m2  Ao root diam: 3.3 cm  asc Aorta Diam: 3.1 cm  LVOT diam: 2.1 cm  LVOT area: 3.5 cm2  Ao root diam index Ht(cm/m): 1.8  Ao root diam  index BSA (cm/m2): 1.4  Asc Ao diam index BSA (cm/m2): 1.3  Asc Ao diam index Ht(cm/m): 1.7  LA Volume (BP): 56.3 ml     LA Volume Index (BP): 24.5 ml/m2  RWT: 0.46  TAPSE: 2.4 cm     Doppler Measurements & Calculations  MV E max stephen: 92.5 cm/sec  MV A max stephen: 111.0 cm/sec  MV E/A: 0.83  MV dec time: 0.28 sec  PA acc time: 0.12 sec  TR max stephen: 251.0 cm/sec  TR max P.2 mmHg  E/E' avg: 10.5  Lateral E/e': 9.4  Medial E/e': 11.6  RV S Stephen: 13.1 cm/sec     ______________________________________________________________________________  Report approved by: Rayo Fuentes 2024 02:57 PM         CBC with Platelets & Differential     Status: Abnormal    Narrative    The following orders were created for panel order CBC with Platelets & Differential.  Procedure                               Abnormality         Status                     ---------                               -----------         ------                     CBC with platelets and d...[[id number]]  Abnormal            Final result                 Please view results for these tests on the individual orders.   Results for orders placed or performed in visit on 24   Magnesium     Status: Normal   Result Value Ref Range    Magnesium 1.8 1.7 - 2.3 mg/dL   Comprehensive metabolic panel     Status: Abnormal   Result Value Ref Range    Sodium 141 135 - 145 mmol/L    Potassium 4.0 3.4 - 5.3 mmol/L    Carbon Dioxide (CO2) 25 22 - 29 mmol/L    Anion Gap 12 7 - 15 mmol/L    Urea Nitrogen 12.4 6.0 - 20.0 mg/dL    Creatinine 1.36 (H) 0.51 - 0.95 mg/dL    GFR Estimate 48 (L) >60 mL/min/1.73m2    Calcium 9.6 8.6 - 10.0 mg/dL    Chloride 104 98 - 107 mmol/L    Glucose 143 (H) 70 - 99 mg/dL    Alkaline Phosphatase 92 40 - 150 U/L    AST 20 0 - 45 U/L    ALT 15 0 - 50 U/L    Protein Total 7.1 6.4 - 8.3 g/dL    Albumin 4.0 3.5 - 5.2 g/dL    Bilirubin Total 0.3 <=1.2 mg/dL     Medications   diphenhydrAMINE (BENADRYL) injection 25 mg (has no administration in time  range)   ondansetron (ZOFRAN) injection 4 mg (has no administration in time range)   iopamidol (ISOVUE-370) solution 117 mL (90 mLs Intravenous $Given 7/1/24 1300)     And   sodium chloride 0.9 % bag for CT scan flush use (90 mLs As instructed $Given 7/1/24 1259)   ondansetron (ZOFRAN) injection 4 mg (4 mg Intravenous Not Given 7/1/24 1331)   sodium chloride 0.9% BOLUS 1,000 mL (0 mLs Intravenous Stopped 7/1/24 1447)   oxyCODONE IR (ROXICODONE) tablet 10 mg (10 mg Oral $Given 7/1/24 1330)   perflutren diluted 1mL to 2mL with saline (OPTISON) diluted injection 5 mL (5 mLs Intravenous $Given 7/1/24 1442)     Labs Ordered and Resulted from Time of ED Arrival to Time of ED Departure   BASIC METABOLIC PANEL - Abnormal       Result Value    Sodium 140      Potassium 4.1      Chloride 104      Carbon Dioxide (CO2) 26      Anion Gap 10      Urea Nitrogen 12.2      Creatinine 1.34 (*)     GFR Estimate 49 (*)     Calcium 9.6      Glucose 162 (*)    ISTAT CREATININE POCT - Abnormal    Creatinine POCT 1.4 (*)     GFR, ESTIMATED POCT 46 (*)    GLUCOSE BY METER - Abnormal    GLUCOSE BY METER POCT 180 (*)    CBC WITH PLATELETS AND DIFFERENTIAL - Abnormal    WBC Count 4.4      RBC Count 2.92 (*)     Hemoglobin 10.1 (*)     Hematocrit 31.9 (*)      (*)     MCH 34.6 (*)     MCHC 31.7      RDW 13.2      Platelet Count 167      % Neutrophils 71      % Lymphocytes 21      % Monocytes 5      % Eosinophils 1      % Basophils 1      % Immature Granulocytes 1      NRBCs per 100 WBC 0      Absolute Neutrophils 3.1      Absolute Lymphocytes 0.9      Absolute Monocytes 0.2      Absolute Eosinophils 0.1      Absolute Basophils 0.0      Absolute Immature Granulocytes 0.0      Absolute NRBCs 0.0     INR - Normal    INR 1.11     PARTIAL THROMBOPLASTIN TIME - Normal    aPTT 25     TROPONIN T, HIGH SENSITIVITY - Normal    Troponin T, High Sensitivity 6     HCG QUALITATIVE PREGNANCY - Normal    hCG Serum Qualitative Negative     GLUCOSE  MONITOR NURSING POCT   ROUTINE UA WITH MICROSCOPIC REFLEX TO CULTURE     Echocardiogram Complete   Final Result      CT Chest Pulmonary Embolism w Contrast   Final Result   IMPRESSION: Pulmonary emboli as diagnosed earlier today. No evidence   of right heart strain. Surgical clips again left breast with history   of breast cancer. Sclerotic bone lesions in the thoracic spine   suggestive of metastatic disease      RIK TOWNSEND MD            SYSTEM ID:  [id number]      Radiologist Consult For Cardiology   Final Result   Abnormal   IMPRESSION:   1. Pulmonary emboli involving the right lower lobar branch and   segmental branch to the lateral right upper lobe. No evidence of right   heart strain.   2. Main pulmonary artery is mildly dilated at 3.3 cm, which can be   seen in the setting of pulmonary hypertension.   3. Please refer to separate cardiology report for further information.         [Urgent Result: Pulmonary emboli]      Finding was identified on 7/1/2024 1:40 PM.       Dr. Terrell was contacted by Dr. Parson at 7/1/2024 1:54 PM and   verbalized understanding of the urgent finding.        I have personally reviewed the examination and initial interpretation   and I agree with the findings.      RIK TOWNSEND MD            SYSTEM ID:  [id number]      CTA Head Neck with Contrast   Final Result   Abnormal   IMPRESSION:    1. Multifocal segmental and subsegmental pulmonary emboli throughout   the right hemithorax, with the majority of the clot burden likely   acute. No significant right-sided heart strain.   2. Head CTA demonstrates no aneurysm or stenosis of the major   intracranial arteries.    3. Neck CTA demonstrates no stenosis of the major cervical arteries.      [Urgent Result: Pulmonary embolism]      Finding was identified on 7/1/2024 1:20PM.       Brodie Jha MD was contacted by Dr. Benitez at 7/1/2024 1:21 PM and   verbalized understanding of the urgent finding.       I have personally reviewed  the examination and initial interpretation   and I agree with the findings.      NASIR LONG MD            SYSTEM ID:  [id number]      CT Head w/o Contrast   Final Result   Impression:    1. No acute intracranial hemorrhage.   2. ASPECT Score: 10/10.      [Stroke Code Result: Negative]      Finding was identified on 7/1/2024 1:11 PM.       Brodie Jha MD was contacted by me on 7/1/2024 1:21 PM and verbalized   understanding of the result.      [facility] Stroke Code Communication Guidelines:   [place] ED: [phone] (EB ED)   [place] Inpatient: [phone] (Resident Pager)    or Felipe 'Stroke First Call Resident [[place]]' or [employer] [phone]   [facility] [place] [facility]: [phone] ([facility] ED)   [facility] [place] [facility]: Page [phone]      I have personally reviewed the examination and initial interpretation   and I agree with the findings.      NASIR LONG MD            SYSTEM ID:  [id number]      CTA  Angiogram Heart    (Results Pending)   MR Brain w/o & w Contrast    (Results Pending)      Reviewed emergency department visit 10/19/2023 for chest pain.  PE protocol chest CT negative.  Reviewed previous EKG, CBC, metabolic panel, troponin, BNP.    Critical Care Addendum  My initial assessment, based on my review of nursing observations, review of vital signs, focused history, and physical exam, established a high suspicion that Teresa Sanderson has ischemic or hemorrhagic stroke, which requires immediate intervention, and therefore she is critically ill.     After the initial assessment, the care team initiated multiple lab tests, initiated IV fluid administration, and consulted with stroke neurology  to provide stabilization care. Due to the critical nature of this patient, I reassessed vital signs, physical exam, mental status, and neurologic status multiple times prior to her disposition.     Time also spent performing documentation, reviewing test results, discussion with consultants, and coordination of care.     Critical care time  (excluding teaching time and procedures): 30 minutes.     Discussed with heme/onc-recommended resuming Xarelto 20 mg daily.    Assessment & Plan    48 year old female with history of breast cancer, DVT, and PE as well as affective disorder to the emergency department for evaluation of right-sided weakness and gait disturbance that began this morning upon awakening and 8:00.  She is on Xarelto for history of DVT/PE but is largely noncompliant.  Patient's head CT and CTA of head and neck unremarkable for cause of symptoms.  Cardiac CT performed as part of stroke evaluation and reveals pulmonary emboli of right lower lobar branch and segmental branch of right upper lobe.  No evidence for heart strain on CT.  Echocardiogram performed and reveals no heart strain.  Additionally, patient's troponin is normal.  Patient to undergo MRI brain with and without contrast.  Discussed recurrent PE with heme-onc-recommended patient resume her daily dosing of Xarelto 20 mg (no need to restart at 15 mg twice daily).  Signed out pending MRI.    I have reviewed the nursing notes. I have reviewed the findings, diagnosis, plan and need for follow up with the patient.    New Prescriptions    No medications on file       Final diagnoses:   Right sided weakness   Acute pulmonary embolism without acute cor pulmonale, unspecified pulmonary embolism type (H)     Chart documentation was completed with Dragon voice-recognition software. Even though reviewed, this chart may still contain some grammatical, spelling, and word errors.     Brodie Terrlel Md    [facility] EMERGENCY DEPARTMENT  7/1/2024     Brodie Terrell MD  07/01/24 1536

## 2024-07-01 NOTE — DISCHARGE INSTRUCTIONS
Take Xarelto 20 mg daily as directed.  Be sure to take your medications as directed.  It is important for you to take Xarelto daily.    Follow-up with your oncologist.    Return if any concerns.

## 2024-07-01 NOTE — ED NOTES
Emergency Department Patient Sign-out       Brief HPI:  This is a 48 year old female signed out to me by Dr. Terrell .  See initial ED Provider note for details of the presentation.            Significant Events prior to my assuming care: The patient is a 48-year-old female who presents to the emergency department with right-sided weakness/gait disturbance.  Awaiting stroke evaluation for this.  Incidentally found to have new PEs, without significant symptoms and no evidence for right heart strain.  This was discussed with hematology, patient has been noncompliant on her prescribed Xarelto.  It was recommended that the patient resume Xarelto 20 mg daily.  Patient has this medication available at home.  Awaiting MRI and neurology recommendations at this time.  If no evidence for acute CVA or other acute process, likely plan for discharge home with resumption of Xarelto.      Exam:   Patient Vitals for the past 24 hrs:   BP Temp Temp src Pulse Resp SpO2 Height Weight   07/01/24 1600 (!) 128/93 -- -- 62 -- 100 % -- --   07/01/24 1530 (!) 137/102 -- -- 64 -- 93 % -- --   07/01/24 1500 121/84 -- -- 60 -- 96 % -- --   07/01/24 1430 121/80 -- -- 56 -- 98 % -- --   07/01/24 1400 126/84 -- -- 63 -- 95 % -- --   07/01/24 1330 (!) 133/108 -- -- 59 -- 100 % -- --   07/01/24 1310 (!) 150/88 -- -- 71 -- 100 % -- --   07/01/24 1245 128/83 -- -- 64 28 100 % -- --   07/01/24 1223 116/68 98.2  F (36.8  C) Oral 68 18 98 % 1.829 m (6') 108.9 kg (240 lb)           ED RESULTS:   Results for orders placed or performed during the hospital encounter of 07/01/24 (from the past 24 hour(s))   EKG 12-lead, tracing only     Status: None    Collection Time: 07/01/24 12:34 PM   Result Value Ref Range    Systolic Blood Pressure  mmHg    Diastolic Blood Pressure  mmHg    Ventricular Rate 70 BPM    Atrial Rate 70 BPM    CA Interval 176 ms    QRS Duration 86 ms     ms    QTc 416 ms    P Axis 48 degrees    R AXIS 13 degrees    T Axis 52  degrees    Interpretation ECG       Sinus rhythm  Normal ECG  Unconfirmed report - interpretation of this ECG is computer generated - see medical record for final interpretation  Confirmed by - EMERGENCY ROOM, PHYSICIAN ([id number]),  DENIA VALENCIA ([id number]) on 7/1/2024 2:15:03 PM     Glucose by meter     Status: Abnormal    Collection Time: 07/01/24 12:40 PM   Result Value Ref Range    GLUCOSE BY METER POCT 180 (H) 70 - 99 mg/dL   Creatinine POCT     Status: Abnormal    Collection Time: 07/01/24 12:42 PM   Result Value Ref Range    Creatinine POCT 1.4 (H) 0.5 - 1.0 mg/dL    GFR, ESTIMATED POCT 46 (L) >60 mL/min/1.73m2   CT Head w/o Contrast     Status: None    Collection Time: 07/01/24  1:09 PM    Narrative    CT HEAD W/O CONTRAST 7/1/2024 1:09 PM    History:  Code Stroke to evaluate for potential thrombolysis and  thrombectomy. PLEASE READ IMMEDIATELY.     Comparison: MR brain 1/26/2023    Technique: Using multidetector thin collimation helical acquisition  technique, axial, coronal and sagittal CT images from the skull base  to the vertex were obtained without intravenous contrast.     Findings: There is no intracranial hemorrhage, mass effect or midline  shift. There is no focal loss of gray-white matter differentiation,  insular ribbon sign, or focally hyperdense artery to suggest acute  infarction. The ventricles are proportionate to the cerebral sulci.    The bony calvaria and the bones of the skull base appear normal. The  visualized portions of the paranasal sinuses and mastoid air cells are  unremarkable.       Impression    Impression:   1. No acute intracranial hemorrhage.  2. ASPECT Score: 10/10.    [Stroke Code Result: Negative]    Finding was identified on 7/1/2024 1:11 PM.     Brodie Jha MD was contacted by me on 7/1/2024 1:21 PM and verbalized  understanding of the result.    [facility] Stroke Code Communication Guidelines:  [place] ED: [phone] ( ED)  [place] Inpatient: [phone] (Resident  Pager)   or Felipe 'Stroke First Call Resident [[place]]' or Amcom [phone]  [facility] ED: [phone] ([facility] ED)  [facility] Inpatient: Page [phone]    I have personally reviewed the examination and initial interpretation  and I agree with the findings.    NASIR LONG MD         SYSTEM ID:  [id number]   CTA Head Neck with Contrast     Status: Abnormal    Collection Time: 07/01/24  1:10 PM   Result Value Ref Range    Radiologist flags Pulmonary embolism (Urgent)     Narrative    EXAM: CTA HEAD NECK W CONTRAST  7/1/2024 1:10 PM     HISTORY:  Code Stroke to evaluate for potential thrombolysis and  thrombectomy. PLEASE READ IMMEDIATELY.       COMPARISON:  Same day head CT 7/1/2024, MR brain 1/26/2023    TECHNIQUE:    HEAD and NECK CTA: During rapid bolus intravenous injection of  nonionic contrast material, axial images were obtained using thin  collimation multidetector helical technique from the base of the upper  aortic arch through the [place] of [place]. This CT angiogram data was  reconstructed at thin intervals with mild overlap. Images were sent to  the 3D workstation, and 3D reconstructions were obtained. The axial  source images, multiplanar reformations, 3D reconstructions in both  maximum intensity projection display and volume rendered models were  reviewed, with reconstructions performed by the technologist.    FINDINGS:  Head CTA demonstrates no intracranial arterial aneurysm or stenosis.    Neck CTA demonstrates conventional aortic arch branching pattern and  patent great vessel origins. No internal carotid artery stenosis. No  vertebral artery stenosis.    Multifocal filling defects are seen throughout the right hemithorax,  for example within the segmental and subsegmental pulmonary arteries  involving the right upper lobe (series 7 images 918, [phone]), not  definitively seen on 6/6/2024. The majority of the filling defects  appear to be acute, however there is a small degree of clot burden may  be adherent  to the walls of the pulmonary artery as evidenced within  the right lower lobe (series 7 image 1006. No pericardial effusion. No  definite evidence of paradoxical bowing of the interventricular  septum. No reflux of contrast in the IVC, however limited on this  nondedicated examination.    Hypoattenuating nodule within the right lobe of thyroid, stable to  decreased in size compared to PET CT 6/6/2024.      Impression    IMPRESSION:   1. Multifocal segmental and subsegmental pulmonary emboli throughout  the right hemithorax, with the majority of the clot burden likely  acute. No significant right-sided heart strain.  2. Head CTA demonstrates no aneurysm or stenosis of the major  intracranial arteries.   3. Neck CTA demonstrates no stenosis of the major cervical arteries.    [Urgent Result: Pulmonary embolism]    Finding was identified on 7/1/2024 1:20PM.     Brodie Jha MD was contacted by Dr. Benitez at 7/1/2024 1:21 PM and  verbalized understanding of the urgent finding.     I have personally reviewed the examination and initial interpretation  and I agree with the findings.    NASIR LONG MD         SYSTEM ID:  [id number]   Radiologist Consult For Cardiology     Status: Abnormal    Collection Time: 07/01/24  1:10 PM   Result Value Ref Range    Radiologist flags Pulmonary emboli (Urgent)     Narrative    EXAMINATION: RADIOLOGIST CONSULT FOR CARDIOLOGY, 7/1/2024 1:10 PM     COMPARISON: PET CT 6/6/2024, 12/11/2023, same-day CT neck, 10/20/2023    HISTORY: Radiology consult for cardiology.    TECHNIQUE: Limited field of view CT images. Please refer to separate  dictation for the cardiology portion of the study.    FINDINGS:   Lungs: Central airways are patent. Unchanged 3 mm groundglass nodule  in the lateral left upper lobe (5/3). No suspicious pulmonary nodules  or masses. No focal airspace consolidation. No pleural effusions. No  pneumothorax.    Mediastinum: No enlarged mediastinal lymph nodes by short  axis  criteria. The heart is not enlarged. No pericardial effusion.  Pulmonary emboli involving the segmental branch to the lateral right  upper lobe and the right lower lobar branch. Main pulmonary artery is  mildly dilated measuring up to 3.3 cm. No pericardial effusion. No  right ventricle enlargement. No bowing of the interventricular septum.  No reflux of contrast into the IVC. The esophagus appears  unremarkable.    Upper abdomen: Visualized portions of the upper abdomen are  unremarkable.    Bones/soft tissue: Degenerative changes of the spine. Increased  sclerosis of the T7 vertebral body compared to 10/20/2023. Otherwise,  no acute or suspicious osseous abnormality.        Impression    IMPRESSION:  1. Pulmonary emboli involving the right lower lobar branch and  segmental branch to the lateral right upper lobe. No evidence of right  heart strain.  2. Main pulmonary artery is mildly dilated at 3.3 cm, which can be  seen in the setting of pulmonary hypertension.  3. Please refer to separate cardiology report for further information.      [Urgent Result: Pulmonary emboli]    Finding was identified on 7/1/2024 1:40 PM.     Dr. Terrell was contacted by Dr. Parson at 7/1/2024 1:54 PM and  verbalized understanding of the urgent finding.      I have personally reviewed the examination and initial interpretation  and I agree with the findings.    RIK TOWNSEND MD         SYSTEM ID:  [id number]   CBC with Platelets & Differential     Status: Abnormal    Collection Time: 07/01/24  1:30 PM    Narrative    The following orders were created for panel order CBC with Platelets & Differential.  Procedure                               Abnormality         Status                     ---------                               -----------         ------                     CBC with platelets and d...[[id number]]  Abnormal            Final result                 Please view results for these tests on the individual orders.   Basic  metabolic panel     Status: Abnormal    Collection Time: 07/01/24  1:30 PM   Result Value Ref Range    Sodium 140 135 - 145 mmol/L    Potassium 4.1 3.4 - 5.3 mmol/L    Chloride 104 98 - 107 mmol/L    Carbon Dioxide (CO2) 26 22 - 29 mmol/L    Anion Gap 10 7 - 15 mmol/L    Urea Nitrogen 12.2 6.0 - 20.0 mg/dL    Creatinine 1.34 (H) 0.51 - 0.95 mg/dL    GFR Estimate 49 (L) >60 mL/min/1.73m2    Calcium 9.6 8.6 - 10.0 mg/dL    Glucose 162 (H) 70 - 99 mg/dL   INR     Status: Normal    Collection Time: 07/01/24  1:30 PM   Result Value Ref Range    INR 1.11 0.85 - 1.15   Partial thromboplastin time     Status: Normal    Collection Time: 07/01/24  1:30 PM   Result Value Ref Range    aPTT 25 22 - 38 Seconds   Troponin T, High Sensitivity     Status: Normal    Collection Time: 07/01/24  1:30 PM   Result Value Ref Range    Troponin T, High Sensitivity 6 <=14 ng/L   hCG Qualitative Pregnancy     Status: Normal    Collection Time: 07/01/24  1:30 PM   Result Value Ref Range    hCG Serum Qualitative Negative Negative   CBC with platelets and differential     Status: Abnormal    Collection Time: 07/01/24  1:30 PM   Result Value Ref Range    WBC Count 4.4 4.0 - 11.0 10e3/uL    RBC Count 2.92 (L) 3.80 - 5.20 10e6/uL    Hemoglobin 10.1 (L) 11.7 - 15.7 g/dL    Hematocrit 31.9 (L) 35.0 - 47.0 %     (H) 78 - 100 fL    MCH 34.6 (H) 26.5 - 33.0 pg    MCHC 31.7 31.5 - 36.5 g/dL    RDW 13.2 10.0 - 15.0 %    Platelet Count 167 150 - 450 10e3/uL    % Neutrophils 71 %    % Lymphocytes 21 %    % Monocytes 5 %    % Eosinophils 1 %    % Basophils 1 %    % Immature Granulocytes 1 %    NRBCs per 100 WBC 0 <1 /100    Absolute Neutrophils 3.1 1.6 - 8.3 10e3/uL    Absolute Lymphocytes 0.9 0.8 - 5.3 10e3/uL    Absolute Monocytes 0.2 0.0 - 1.3 10e3/uL    Absolute Eosinophils 0.1 0.0 - 0.7 10e3/uL    Absolute Basophils 0.0 0.0 - 0.2 10e3/uL    Absolute Immature Granulocytes 0.0 <=0.4 10e3/uL    Absolute NRBCs 0.0 10e3/uL   Extra Tube ([place] Draw)      Status: None    Collection Time: 07/01/24  1:30 PM    Narrative    The following orders were created for panel order Extra Tube ([place] Draw).  Procedure                               Abnormality         Status                     ---------                               -----------         ------                     Extra Green Top (Lithium...[[id number]]                      Final result                 Please view results for these tests on the individual orders.   Extra Green Top (Lithium Heparin) Tube     Status: None    Collection Time: 07/01/24  1:30 PM   Result Value Ref Range    Hold Specimen [facility]    CT Chest Pulmonary Embolism w Contrast     Status: None    Collection Time: 07/01/24  2:29 PM    Narrative    CT pulmonary angiogram with contrast    INDICATION: Right-sided weakness.    CONTRAST: 90 mL of intravenous Isovue-370    COMPARISON: Chest CT coronary angiogram earlier today    FINDINGS: Right thyroid lobe nodule approximately 15 mm in diameter.  No lymphadenopathy. Heart size normal. Thoracic aortic caliber normal.  Pulmonary artery caliber approximately normal 2.5 cm.  No definite left-sided pulmonary embolus. However, correlating with  the recent CT, there are are hypodense filling defects in the right  upper lobe are as well as lower lobar.  No pleural or pericardial effusion. No enlarged lymph nodes in the  chest. Surgical clips left breast. History of breast cancer.  The included upper abdomen in the pulmonary arterial phase is not very  sensitive for any lesion detection. No obvious abnormal findings  noted.  Bone detail shows patchy sclerotic findings in the lower cervical  spine as well as the right side left T7 as well as much of the T9 and  T11 vertebral bodies prominent T7 and subtle T6 peripherally sclerotic  densities as well, concerning for metastatic disease.    Detail of the lungs shows mild dependent atelectasis lung bases.  Mosaic attenuation of the lungs which could be related to  inspiratory  technique on pulmonary angiogram scanning. The groundglass/mosaicism  it is more convincing internal lobes within the upper lobes.  Subsegmental atelectasis laterally in the right lower lobe. 1-2 mm  lateral left upper lobe nodule (2/76) unchanged from prior CTA  pulmonary angiogram 8/10/2021. Therefore this should be benign.      Impression    IMPRESSION: Pulmonary emboli as diagnosed earlier today. No evidence  of right heart strain. Surgical clips again left breast with history  of breast cancer. Sclerotic bone lesions in the thoracic spine  suggestive of metastatic disease    RIK TOWNSEND MD         SYSTEM ID:  [id number]   Echocardiogram Complete     Status: None    Collection Time: 24  2:42 PM   Result Value Ref Range    LVEF  60-65%     Narrative    [id number]  [id number]  [id number]  [id number]^MP^UMM     [facility]  Echocardiography Laboratory  [address] [place] [id number]     Name: RYANN MANN  MRN: [id number]  : 1975  Study Date: 2024 01:50 PM  Age: 48 yrs  Gender: Female  Patient Location: [facility]  Reason For Study: Pulmonary Embolism  Ordering Physician: UMM GRESHAM  Performed By: Jahaira Santillan RDCS     BSA: 2.3 m2  Height: 72 in  Weight: 240 lb  HR: 56  BP: 116/68 mmHg  ______________________________________________________________________________  Procedure  Complete Portable Echo Adult. Contrast Optison. Poor acoustic windows. Optison  (NDC #0893-8273-13) given intravenously. Patient was given 5 ml mixture of 3  ml Optison and 6 ml saline. 4 ml wasted.  ______________________________________________________________________________  Interpretation Summary  Global and regional left ventricular function is normal with an EF of 60-65%.  Right ventricular function, chamber size, wall motion, and thickness are  normal.  Pulmonary artery systolic pressure is normal.  The inferior vena cava is normal.  No pericardial  effusion is present.  There is no prior study for direct comparison.  ______________________________________________________________________________  Left Ventricle  Global and regional left ventricular function is normal with an EF of 60-65%.  Left ventricular wall thickness is normal. Left ventricular size is normal.  Left ventricular diastolic function is normal. No regional wall motion  abnormalities are seen.     Right Ventricle  Right ventricular function, chamber size, wall motion, and thickness are  normal.     Atria  Both atria appear normal. The atrial septum is intact as assessed by color  Doppler .     Mitral Valve  The mitral valve is normal. Trace mitral insufficiency is present.     Aortic Valve  Aortic valve is normal in structure and function.     Tricuspid Valve  The tricuspid valve is normal. Trace tricuspid insufficiency is present. The  right ventricular systolic pressure is approximated at 25.2 mmHg plus the  right atrial pressure. Pulmonary artery systolic pressure is normal.     Pulmonic Valve  The pulmonic valve is normal.     Vessels  The aorta root is normal. The thoracic aorta is normal. The pulmonary artery  and bifurcation cannot be assessed. The inferior vena cava is normal.     Pericardium  No pericardial effusion is present.     Compared to Previous Study  There is no prior study for direct comparison.  ______________________________________________________________________________  MMode/2D Measurements & Calculations     IVSd: 1.1 cm  LVIDd: 4.7 cm  LVIDs: 3.0 cm  LVPWd: 1.1 cm  FS: 36.7 %  LV mass(C)d: 188.8 grams  LV mass(C)dI: 82.0 grams/m2  Ao root diam: 3.3 cm  asc Aorta Diam: 3.1 cm  LVOT diam: 2.1 cm  LVOT area: 3.5 cm2  Ao root diam index Ht(cm/m): 1.8  Ao root diam index BSA (cm/m2): 1.4  Asc Ao diam index BSA (cm/m2): 1.3  Asc Ao diam index Ht(cm/m): 1.7  LA Volume (BP): 56.3 ml     LA Volume Index (BP): 24.5 ml/m2  RWT: 0.46  TAPSE: 2.4 cm     Doppler Measurements &  Calculations  MV E max stephen: 92.5 cm/sec  MV A max stephen: 111.0 cm/sec  MV E/A: 0.83  MV dec time: 0.28 sec  PA acc time: 0.12 sec  TR max stephen: 251.0 cm/sec  TR max P.2 mmHg  E/E' avg: 10.5  Lateral E/e': 9.4  Medial E/e': 11.6  RV S Stephen: 13.1 cm/sec     ______________________________________________________________________________  Report approved by: Rayo Fuentes 2024 02:57 PM             ED MEDICATIONS:   Medications   diphenhydrAMINE (BENADRYL) injection 25 mg (has no administration in time range)   ondansetron (ZOFRAN) injection 4 mg (has no administration in time range)   iopamidol (ISOVUE-370) solution 117 mL (90 mLs Intravenous $Given 24 1300)     And   sodium chloride 0.9 % bag for CT scan flush use (90 mLs As instructed $Given 24 1259)   ondansetron (ZOFRAN) injection 4 mg (4 mg Intravenous Not Given 24 1331)   sodium chloride 0.9% BOLUS 1,000 mL (0 mLs Intravenous Stopped 24 1447)   oxyCODONE IR (ROXICODONE) tablet 10 mg (10 mg Oral $Given 24 1330)   perflutren diluted 1mL to 2mL with saline (OPTISON) diluted injection 5 mL (5 mLs Intravenous $Given 24 1442)         Impression:    ICD-10-CM    1. Right sided weakness  R53.1       2. Acute pulmonary embolism without acute cor pulmonale, unspecified pulmonary embolism type (H)  I26.99           Plan:    Pending studies include MRI.    MRI shows NAD.    Patient was discussed with neurology, patient okay to discharge from neurologic perspective.    Patient to be discharged home. Advised to follow up with PCP within 1 week. To return to ER immediately with any new/worsening symptoms. Plan of care discussed with patient who expresses understanding and agrees with plan of care.    Patient to resume Xarelto as directed    MD Michelle Nelson Michelle C, MD  [phone]

## 2024-07-01 NOTE — CONSULTS
[facility]     Stroke Code Note          History of Present Illness     Chief Complaint: Dizziness      Teresa Sanderson is a 48 year old R-handed female with PMHx of breast cancer with metastasis to spine, left popliteal DVT, tobacco use, history of PE, depression and anxiety on Xarelto who presented to the ED with right-sided weakness for which stroke code was called.    Last known well was at midnight when patient went to bed.  When she woke up at 8 AM, she noticed that she had clumsiness of her right hand and was dropping things.  Noted some weakness on the right side, and possibly some word finding difficulties. She went to radiation oncology appointment today, And they referred her here to the ED for further workup of her symptoms.  Symptoms are getting better but not fully resolved now.  She also notes some lightheadedness (not room spinning), as well as some persistent back pain, latter has been going on for some time now.  No other focal neurological deficits.    Last dose of Xarelto was yesterday morning.  Today she took other meds, but not her Xarelto.  Initial /68, glucose 180, creatinine 1.4.         Past Medical History     Stroke risk factors: Cancer, hypercoagulability    Preadmission antithrombotic regimen: Xarelto 20 mg daily    Modified Tonja Score (Pre-morbid)  0-No deficits                   Assessment and Plan       1.  Right arm clumsiness  Lightheadedness  48 F with PMHx of breast cancer with mets, left popliteal DVT on Xarelto, tobacco use, who presented to the ED with right-sided weakness, LKW 12 AM when she went to bed, symptoms were noted on waking up at 8 AM.  NIHSS of 1 for trace right nasolabial flattening which completely corrects on activation.  CT/CTA were unremarkable with no bleed or LVO.  Not a candidate for TNK given onset over 4.5 hours, no LVO for EVT.  In terms of etiology, this could certainly be a stroke given her  high risk factors despite the Xarelto, or an alternative etiology could be metastasis with possibly a focal seizure, though the latter should not cause persistent subjective symptoms (even though we did not find any objective symptoms of her weakness).  Nonetheless, would like to get MRI brain with and without contrast for these differentials.    Intravenous Thrombolysis  Not given due to:   - minor/isolated/quickly resolving symptoms  - unclear or unfavorable risk-benefit profile for extended window thrombolysis beyond the conventional 4.5 hour time window     Endovascular Treatment  Not initiated due to absence of proximal vessel occlusion     Plan:  MRI Brain w/wo contrast to r/o stroke or mets.      ___________________________________________________________________    The patient was discussed with Stroke  Staff Dr. Townsend.    Michelle Heurta MD  Neurology Resident    Addendum: MRI was performed and does not show any evidence of stroke or metastasis.  She was found to have incidental PE.    No further stroke specific management required from our perspective.  Further disposition and management deferred to emergency department.    Will plan to sign off at this point, kindly call us with any questions.  ___________________________________________________________________        Imaging/Labs   (personally reviewed )    CT head: no bleed  CTA head/neck: no LVO           Physical Examination     BP: 116/68   Pulse: 68   Resp: 18   Temp: 98.2  F (36.8  C)   Temp src: Oral   SpO2: 98 %   O2 Device: None (Room air)   Weight: 108.9 kg (240 lb)    Wt Readings from Last 2 Encounters:   07/01/24 108.9 kg (240 lb)   07/01/24 108.9 kg (240 lb)       General Exam  General: No acute distress.  HEENT: Normocephalic, atraumatic. Sclera anicteric. No nasal drainage or epistaxis.  CV: Extremities appear to be appropriately perfused.  Pulmonary: Breathing comfortably on room air. No accessory muscle use.  GI/: Soft,  non-distended.  MSK: No LE edema.  Integument: Warm, dry. No jaundice.     Neuro Exam  Mental Status:  alert, knows month and age., follows commands, speech clear and fluent, naming and repetition normal, able to follow two-step commands crossing midline  Cranial Nerves:  visual fields intact, EOMI with normal smooth pursuit, facial sensation intact and symmetric, facial movements symmetric, does have slight right nasolabial flattening that completely corrects on activation, no dysarthria, tongue protrusion midline  Motor:  normal muscle tone and bulk, no abnormal movements, able to move all limbs spontaneously, no pronator drift. Shoulder abduction 4+/5 bilaterally with some giveaway,  strength 5/5 bilaterally.  Reflexes:  no clonus  Sensory:  light touch sensation intact and symmetric throughout upper and lower extremities, no extinction on double simultaneous stimulation   Coordination:  normal finger-to-nose and heel-to-shin bilaterally without dysmetria, rapid alternating movements symmetric such as finger tapping..    Station/Gait:  deferred        Stroke Scales       NIHSS  1a. Level of Consciousness 0-->Alert, keenly responsive   1b. LOC Questions 0-->Answers both questions correctly   1c. LOC Commands 0-->Performs both tasks correctly   2.   Best Gaze 0-->Normal   3.   Visual 0-->No visual loss   4.   Facial Palsy 1-->Minor paralysis (flattened nasolabial fold, asymmetry on smiling) (L side, corrects)   5a. Motor Arm, Left 0-->No drift, limb holds 90 (or 45) degrees for full 10 secs   5b. Motor Arm, Right 0-->No drift, limb holds 90 (or 45) degrees for full 10 secs   6a. Motor Leg, Left 0-->No drift, leg holds 30 degree position for full 5 secs   6b. Motor Leg, right 0-->No drift, leg holds 30 degree position for full 5 secs   7.   Limb Ataxia 0-->Absent   8.   Sensory 0-->Normal, no sensory loss   9.   Best Language 0-->No aphasia, normal   10. Dysarthria 0-->Normal   11. Extinction and Inattention   0-->No abnormality   Total 1 (07/01/24 1247)            Labs     CBC  Lab Results   Component Value Date    HGB 11.0 (L) 06/06/2024    HCT 32.5 (L) 06/06/2024    WBC 4.4 06/06/2024     (L) 06/06/2024       BMP  Lab Results   Component Value Date     07/01/2024    POTASSIUM 4.0 07/01/2024    CHLORIDE 104 07/01/2024    CO2 25 07/01/2024    BUN 12.4 07/01/2024    CR 1.4 (H) 07/01/2024     (H) 07/01/2024    NANDO 9.6 07/01/2024       INR  INR   Date Value Ref Range Status   10/19/2023 1.10 0.85 - 1.15 Final   05/17/2021 1.03 0.86 - 1.14 Final   05/17/2021 Canceled, Test credited 0.86 - 1.14 Final     Comment:     Unsatisfactory specimen - tube underfilled  NOTIFIED PEARL MCKINNEY RN AT 1111 5/17/21 BY LORIE         Data   Stroke Code Data  (for stroke code without tele)  Stroke code activated 07/01/24  1232   First stroke provider response 07/01/24  1233   Last known normal 07/01/24  0000   Time of discovery (or onset of symptoms)        Head CT read by Stroke Neuro Provider 07/01/24  1255   Was stroke code de-escalated? Yes  07/01/24  0109        Clinically Significant Risk Factors Present on Admission               # Drug Induced Coagulation Defect: home medication list includes an anticoagulant medication                # Obesity: Estimated body mass index is 32.55 kg/m  as calculated from the following:    Height as of this encounter: 1.829 m (6').    Weight as of this encounter: 108.9 kg (240 lb).                Time Spent on this Encounter   Billing:

## 2024-07-01 NOTE — PROGRESS NOTES
Stroke Code Nurse-Responder Note    Arrival Time to Stroke Code: 1232    Stroke Code Team interventions:   - De-escalated at 1309 by Dr. Huerta    ED/Bedside Nurse providing handoff: Damian Olmos RN    Time left for CT: 1240    Time arrived to next location (ED/Unit/IR): 1255    ED/Bedside Nurse given handoff (name/time): RICHARD Reid RN

## 2024-07-01 NOTE — LETTER
2024      Teresa Sanderson  [address]  [place] [address]      Dear Colleague,    Thank you for referring your patient, Teresa Sanderson, to the [facility] RADIATION ONCOLOGY. Please see a copy of my visit note below.    Recommended patient go to ER with new symptoms    RADIATION ONCOLOGY WEEKLY ON TREATMENT VISIT   Encounter Date: 2024    Patient Name: Teresa Sanderson  MRN: [id number]  : 1975     Disease and Stage: Breast cancer metastatic to bone  Treatment Site: Left sacroiliac and left acetabulum  Current Dose/Planned Total Dose: [600] cGy / [3000] cGy to both areas    Concurrent Chemotherapy: Yes  Drug and Frequency: abemaciclib (VERZENIO) 100 MG tablet and fulvestrant (FASLODEX) injection 500 mg    Medical Oncologist: Jahaira Plunkett MD    Subjective: Ms. Sanderson presents to clinic today for her weekly on-treatment visit.  Since yesterday, she has felt lightheaded and notes numbness and weakness of her hands such that it is difficult to hold her phone.  While in clinic today, she dropped a cup of coffee as well as her phone several times.  She is not having any headache, nausea, vomiting, confusion, visual changes.  She is dizzy when she stands from a sitting position and feels she may be dehydrated from her activities on .  She does note difficulty with holding things and is frequently dropping items.  This started approximately 1 to 2 days ago.  She is not having any difficulty with walking.    Nursing ROS:   Nutrition Alteration  Diet Type: Patient's Preference  Skin  Skin Reaction: 0 - No changes  CNS Alteration  CNS note: Dropping things randomly     Cardiovascular  Respiratory effort: 1 - Normal - without distress  Gastrointestinal  Nausea: 0 - None     Pain Assessment  0-10 Pain Scale: 0    PEG Tube: No  Electronic Cardiac Implant: No (if yes, type)  Objective:   /74   Pulse 82   Wt 108.9 kg (240 lb)   BMI 33.47 kg/m    Gen:  Appears well, alert, oriented  Upper extremities with normal muscle bulk and tone, arms with full range of motion, sensation intact to light touch  Skin in treatment field: No erythema    Laboratory:  Lab Results   Component Value Date    WBC 4.4 07/01/2024    HGB 10.1 (L) 07/01/2024    HCT 31.9 (L) 07/01/2024     (H) 07/01/2024     07/01/2024     Lab Results   Component Value Date    CR 1.34 (H) 07/01/2024     Lab Results   Component Value Date     07/01/2024    POTASSIUM 4.1 07/01/2024    CHLORIDE 104 07/01/2024    CO2 26 07/01/2024     (H) 07/01/2024     Lab Results   Component Value Date    AST 20 07/01/2024    ALT 15 07/01/2024    ALKPHOS 92 07/01/2024    BILITOTAL 0.3 07/01/2024     Magnesium   Date Value Ref Range Status   07/01/2024 1.8 1.7 - 2.3 mg/dL Final       Treatment-related toxicities (CTCAE v5.0):  Anorexia: Grade 0: No toxicity  Fatigue: Grade 1: Fatigue relieved by rest  Nausea: Grade 0: No toxicity  Pain: Grade 1: Mild pain  Dermatitis: Grade 0: No toxicity    ED visits/Hospitalizations:  None    Missed Treatments:  None    Mosaiq chart and setup information reviewed  IGRT images reviewed    Medication Review  Med list reviewed with patient?: Yes  Med list printed and given: Offered and declined    Assessment:    Ms. Sanderson is a 48 year old female with metastatic breast cancer to bone who is receiving palliative radiation to the left sacroiliac and left acetabulum.  She has recently developed weakness of her upper extremities as well as lightheadedness.    Plan:   1.  Discussed increasing fluid intake including electrolytes as her blood pressure is low today when compared to previous readings which may represent some orthostatic hypotension.  2.  Continue palliative radiation  3.  While in clinic, she dropped her coffee cup as well as her phone several times.  Given these new findings, we have right commended that she be seen in the ED for further evaluation.  Patient  was sent to [facility].      Malu Christopher  Department of Radiation Oncology  [facility]                 Again, thank you for allowing me to participate in the care of your patient.        Sincerely,        Malu Christopher MD

## 2024-07-01 NOTE — PROGRESS NOTES
RADIATION ONCOLOGY WEEKLY ON TREATMENT VISIT   Encounter Date: 2024    Patient Name: Teresa Sanderson  MRN: [id number]  : 1975     Disease and Stage: Breast cancer metastatic to bone  Treatment Site: Left sacroiliac and left acetabulum  Current Dose/Planned Total Dose: [600] cGy / [3000] cGy to both areas    Concurrent Chemotherapy: Yes  Drug and Frequency: abemaciclib (VERZENIO) 100 MG tablet and fulvestrant (FASLODEX) injection 500 mg    Medical Oncologist: Jahaira Plunkett MD    Subjective: Ms. Sanderson presents to clinic today for her weekly on-treatment visit.  Since yesterday, she has felt lightheaded and notes numbness and weakness of her hands such that it is difficult to hold her phone.  While in clinic today, she dropped a cup of coffee as well as her phone several times.  She is not having any headache, nausea, vomiting, confusion, visual changes.  She is dizzy when she stands from a sitting position and feels she may be dehydrated from her activities on .  She does note difficulty with holding things and is frequently dropping items.  This started approximately 1 to 2 days ago.  She is not having any difficulty with walking.    Nursing ROS:   Nutrition Alteration  Diet Type: Patient's Preference  Skin  Skin Reaction: 0 - No changes  CNS Alteration  CNS note: Dropping things randomly     Cardiovascular  Respiratory effort: 1 - Normal - without distress  Gastrointestinal  Nausea: 0 - None     Pain Assessment  0-10 Pain Scale: 0    PEG Tube: No  Electronic Cardiac Implant: No (if yes, type)  Objective:   /74   Pulse 82   Wt 108.9 kg (240 lb)   BMI 33.47 kg/m    Gen: Appears well, alert, oriented  Upper extremities with normal muscle bulk and tone, arms with full range of motion, sensation intact to light touch  Skin in treatment field: No erythema    Laboratory:  Lab Results   Component Value Date    WBC 4.4 2024    HGB 10.1 (L) 2024    HCT 31.9 (L) 2024      (H) 07/01/2024     07/01/2024     Lab Results   Component Value Date    CR 1.34 (H) 07/01/2024     Lab Results   Component Value Date     07/01/2024    POTASSIUM 4.1 07/01/2024    CHLORIDE 104 07/01/2024    CO2 26 07/01/2024     (H) 07/01/2024     Lab Results   Component Value Date    AST 20 07/01/2024    ALT 15 07/01/2024    ALKPHOS 92 07/01/2024    BILITOTAL 0.3 07/01/2024     Magnesium   Date Value Ref Range Status   07/01/2024 1.8 1.7 - 2.3 mg/dL Final       Treatment-related toxicities (CTCAE v5.0):  Anorexia: Grade 0: No toxicity  Fatigue: Grade 1: Fatigue relieved by rest  Nausea: Grade 0: No toxicity  Pain: Grade 1: Mild pain  Dermatitis: Grade 0: No toxicity    ED visits/Hospitalizations:  None    Missed Treatments:  None    Mosaiq chart and setup information reviewed  IGRT images reviewed    Medication Review  Med list reviewed with patient?: Yes  Med list printed and given: Offered and declined    Assessment:    Ms. Sanderson is a 48 year old female with metastatic breast cancer to bone who is receiving palliative radiation to the left sacroiliac and left acetabulum.  She has recently developed weakness of her upper extremities as well as lightheadedness.    Plan:   1.  Discussed increasing fluid intake including electrolytes as her blood pressure is low today when compared to previous readings which may represent some orthostatic hypotension.  2.  Continue palliative radiation  3.  While in clinic, she dropped her coffee cup as well as her phone several times.  Given these new findings, we have right commended that she be seen in the ED for further evaluation.  Patient was sent to ED.      Malu Christopher  Department of Radiation Oncology  [facility]

## 2024-07-01 NOTE — ED TRIAGE NOTES
Triage Assessment & Note:    /68   Pulse 68   Temp 98.2  F (36.8  C) (Oral)   Resp 18   Ht 1.829 m (6')   Wt 108.9 kg (240 lb)   SpO2 98%   BMI 32.55 kg/m      Patient presents with: PT reports right side weakness. PT reports last known well at 2300 6/60/24.     Home Treatments/Remedies: None    Febrile / Afebrile? Afebrile     Duration of C/o:  13 hours    Fede Harris RN  July 1, 2024       Triage Assessment (Adult)       Row Name 07/01/24 1224          Triage Assessment    Airway WDL WDL        Respiratory WDL    Respiratory WDL WDL        Cardiac WDL    Cardiac WDL WDL

## 2024-07-02 ENCOUNTER — APPOINTMENT (OUTPATIENT)
Dept: RADIATION ONCOLOGY | Facility: CLINIC | Age: 49
End: 2024-07-02
Attending: RADIOLOGY
Payer: MEDICARE

## 2024-07-02 PROCEDURE — 77386 HC IMRT TREATMENT DELIVERY, COMPLEX: CPT | Performed by: RADIOLOGY

## 2024-07-02 PROCEDURE — 77014 PR CT GUIDE FOR PLACEMENT RADIATION THERAPY FIELDS: CPT | Mod: 26 | Performed by: RADIOLOGY

## 2024-07-03 ENCOUNTER — APPOINTMENT (OUTPATIENT)
Dept: RADIATION ONCOLOGY | Facility: CLINIC | Age: 49
End: 2024-07-03
Attending: RADIOLOGY
Payer: MEDICARE

## 2024-07-03 PROCEDURE — 77427 RADIATION TX MANAGEMENT X5: CPT | Performed by: RADIOLOGY

## 2024-07-03 PROCEDURE — 77336 RADIATION PHYSICS CONSULT: CPT | Performed by: RADIOLOGY

## 2024-07-03 PROCEDURE — 77014 PR CT GUIDE FOR PLACEMENT RADIATION THERAPY FIELDS: CPT | Mod: 26 | Performed by: RADIOLOGY

## 2024-07-03 PROCEDURE — 77386 HC IMRT TREATMENT DELIVERY, COMPLEX: CPT | Performed by: RADIOLOGY

## 2024-07-03 RX ORDER — LAMOTRIGINE 25 MG/1
500 TABLET ORAL ONCE
Status: CANCELLED | OUTPATIENT
Start: 2024-07-08 | End: 2024-08-01

## 2024-07-05 ENCOUNTER — OFFICE VISIT (OUTPATIENT)
Dept: RADIATION ONCOLOGY | Facility: CLINIC | Age: 49
End: 2024-07-05
Attending: RADIOLOGY
Payer: MEDICARE

## 2024-07-05 VITALS
HEART RATE: 88 BPM | BODY MASS INDEX: 32.41 KG/M2 | WEIGHT: 239 LBS | SYSTOLIC BLOOD PRESSURE: 151 MMHG | DIASTOLIC BLOOD PRESSURE: 100 MMHG

## 2024-07-05 DIAGNOSIS — C50.912 CARCINOMA OF LEFT BREAST METASTATIC TO BONE (H): Primary | ICD-10-CM

## 2024-07-05 DIAGNOSIS — C79.51 CARCINOMA OF LEFT BREAST METASTATIC TO BONE (H): Primary | ICD-10-CM

## 2024-07-05 PROCEDURE — 77386 HC IMRT TREATMENT DELIVERY, COMPLEX: CPT | Performed by: RADIOLOGY

## 2024-07-05 NOTE — LETTER
7/5/2024      Teresa Sanderson  [address]  [place] [place] [facility] [address]      Dear Colleague,    Thank you for referring your patient, Teresa Sanderson, to the [facility] RADIATION ONCOLOGY. Please see a copy of my visit note below.    WEEKLY MANAGEMENT NOTE  Radiation Oncology  7/5/2024         Patient Name: Teresa Sanderson  MRN: [id number]    Treatment Site: L1 - S1, Left acetabulum Current Dose: 1200/3000 cGy Fractions: 6/15       DAILY DOSE:     200 cGy/ day,  5 times/week    DISEASE UNDER TREATMENT: Metastatic breast cancer to the sacrum    SUBJECTIVE:48 year old woman is undergoing palliative radiotherapy to the left hip and SI joint. She feels the left hip discomfort is more pronounced when lying on the treatment table but it improved off the table.    CTC V5.0 Toxicity Criteria  Fatigue: Grade 1: Fatigue relieved by rest  Comment:     Pain Score:  6 /10     OBJECTIVE:  BP (!) 151/100   Pulse 88   Wt 108.4 kg (239 lb)   BMI 32.41 kg/m    Wt Readings from Last 2 Encounters:   07/05/24 108.4 kg (239 lb)   07/01/24 108.9 kg (240 lb)      Skin: Grade 0: No toxicity        IMPRESSION: The patient is tolerating the treatment.  The patient set up, dose, and cone beam CT images were reviewed.    PLAN: Continue radiotherapy    PAIN MANAGEMENT PLAN: Continue current management    JOSE ANTONIO Naranjo M.D.  Department of Radiation Oncology  [facility]       Again, thank you for allowing me to participate in the care of your patient.        Sincerely,        Ximena Naranjo MD

## 2024-07-05 NOTE — PROGRESS NOTES
WEEKLY MANAGEMENT NOTE  Radiation Oncology  7/5/2024         Patient Name: Teresa Sanderson  MRN: [id number]    Treatment Site: L1 - S1, Left acetabulum Current Dose: 1200/3000 cGy Fractions: 6/15       DAILY DOSE:     200 cGy/ day,  5 times/week    DISEASE UNDER TREATMENT: Metastatic breast cancer to the sacrum    SUBJECTIVE:48 year old woman is undergoing palliative radiotherapy to the left hip and SI joint. She feels the left hip discomfort is more pronounced when lying on the treatment table but it improved off the table.    CTC V5.0 Toxicity Criteria  Fatigue: Grade 1: Fatigue relieved by rest  Comment:     Pain Score:  6 /10     OBJECTIVE:  BP (!) 151/100   Pulse 88   Wt 108.4 kg (239 lb)   BMI 32.41 kg/m    Wt Readings from Last 2 Encounters:   07/05/24 108.4 kg (239 lb)   07/01/24 108.9 kg (240 lb)      Skin: Grade 0: No toxicity        IMPRESSION: The patient is tolerating the treatment.  The patient set up, dose, and cone beam CT images were reviewed.    PLAN: Continue radiotherapy    PAIN MANAGEMENT PLAN: Continue current management    JOSE ANTONIO Naranjo M.D.  Department of Radiation Oncology  [facility]

## 2024-07-08 ENCOUNTER — APPOINTMENT (OUTPATIENT)
Dept: RADIATION ONCOLOGY | Facility: CLINIC | Age: 49
End: 2024-07-08
Attending: RADIOLOGY
Payer: MEDICARE

## 2024-07-08 DIAGNOSIS — C50.812 MALIGNANT NEOPLASM OF OVERLAPPING SITES OF LEFT BREAST IN FEMALE, ESTROGEN RECEPTOR POSITIVE (H): ICD-10-CM

## 2024-07-08 DIAGNOSIS — C50.912 CARCINOMA OF LEFT BREAST METASTATIC TO BONE (H): Primary | ICD-10-CM

## 2024-07-08 DIAGNOSIS — C79.51 CARCINOMA OF LEFT BREAST METASTATIC TO BONE (H): Primary | ICD-10-CM

## 2024-07-08 DIAGNOSIS — Z17.0 MALIGNANT NEOPLASM OF OVERLAPPING SITES OF LEFT BREAST IN FEMALE, ESTROGEN RECEPTOR POSITIVE (H): ICD-10-CM

## 2024-07-08 PROCEDURE — 77014 PR CT GUIDE FOR PLACEMENT RADIATION THERAPY FIELDS: CPT | Mod: 26 | Performed by: RADIOLOGY

## 2024-07-08 PROCEDURE — 77386 HC IMRT TREATMENT DELIVERY, COMPLEX: CPT | Performed by: RADIOLOGY

## 2024-07-09 ENCOUNTER — APPOINTMENT (OUTPATIENT)
Dept: LAB | Facility: CLINIC | Age: 49
End: 2024-07-09
Attending: INTERNAL MEDICINE
Payer: MEDICARE

## 2024-07-09 ENCOUNTER — INFUSION THERAPY VISIT (OUTPATIENT)
Dept: ONCOLOGY | Facility: CLINIC | Age: 49
End: 2024-07-09
Attending: INTERNAL MEDICINE
Payer: MEDICARE

## 2024-07-09 ENCOUNTER — APPOINTMENT (OUTPATIENT)
Dept: RADIATION ONCOLOGY | Facility: CLINIC | Age: 49
End: 2024-07-09
Attending: RADIOLOGY
Payer: MEDICARE

## 2024-07-09 VITALS
HEART RATE: 82 BPM | TEMPERATURE: 98.5 F | DIASTOLIC BLOOD PRESSURE: 77 MMHG | OXYGEN SATURATION: 97 % | SYSTOLIC BLOOD PRESSURE: 117 MMHG | RESPIRATION RATE: 20 BRPM

## 2024-07-09 DIAGNOSIS — C79.51 CARCINOMA OF LEFT BREAST METASTATIC TO BONE (H): ICD-10-CM

## 2024-07-09 DIAGNOSIS — C50.812 MALIGNANT NEOPLASM OF OVERLAPPING SITES OF LEFT BREAST IN FEMALE, ESTROGEN RECEPTOR POSITIVE (H): Primary | ICD-10-CM

## 2024-07-09 DIAGNOSIS — Z17.0 MALIGNANT NEOPLASM OF OVERLAPPING SITES OF LEFT BREAST IN FEMALE, ESTROGEN RECEPTOR POSITIVE (H): Primary | ICD-10-CM

## 2024-07-09 DIAGNOSIS — C50.912 CARCINOMA OF LEFT BREAST METASTATIC TO BONE (H): ICD-10-CM

## 2024-07-09 PROCEDURE — 250N000011 HC RX IP 250 OP 636: Performed by: INTERNAL MEDICINE

## 2024-07-09 PROCEDURE — 86300 IMMUNOASSAY TUMOR CA 15-3: CPT

## 2024-07-09 PROCEDURE — 77386 HC IMRT TREATMENT DELIVERY, COMPLEX: CPT | Performed by: RADIOLOGY

## 2024-07-09 PROCEDURE — 96402 CHEMO HORMON ANTINEOPL SQ/IM: CPT

## 2024-07-09 PROCEDURE — 82378 CARCINOEMBRYONIC ANTIGEN: CPT

## 2024-07-09 RX ORDER — LAMOTRIGINE 25 MG/1
500 TABLET ORAL ONCE
Status: COMPLETED | OUTPATIENT
Start: 2024-07-09 | End: 2024-07-09

## 2024-07-09 RX ADMIN — FULVESTRANT 500 MG: 50 INJECTION, SOLUTION INTRAMUSCULAR at 14:20

## 2024-07-09 ASSESSMENT — PAIN SCALES - GENERAL: PAINLEVEL: SEVERE PAIN (6)

## 2024-07-09 NOTE — PROGRESS NOTES
"Infusion Nursing Note:  Teresa Sanderson presents today for Fulvestrant/Labs.    Patient seen by provider today: No   present during visit today: Not Applicable.    Note: Patient did not go to lab appointment and wanted to be \"seen sooner\" as she had transportation waiting. Writer explained to patient it's important she goes to lab first next time. Pt verbalized understanding.      Intravenous Access:  Venipuncture by writer. Standing labs drawn.      Treatment Conditions:  Lab Results   Component Value Date    HGB 10.6 (L) 07/09/2024    WBC 4.5 07/09/2024    ANEU 1.3 (L) 01/15/2024    ANEUTAUTO 3.3 07/09/2024     07/09/2024     Today's ANC 3.3.  Results reviewed, ok to proceed with treatment.    Post Infusion Assessment:  Patient tolerated Fulvestrent injections into bilateral ventrogluteal simultaneously (with help of ESME Amin) without incident.       Discharge Plan:   Patient states she will reach out to palliative provider for refill on her Oxycodone.  Discharge instructions reviewed with: Patient.  Patient and/or family verbalized understanding of discharge instructions and all questions answered.  AVS to patient via [employer].  Patient will return 7/31 for Zometa.  Patient discharged in stable condition accompanied by: friend.  Departure Mode: Ambulatory.      Nadya Cho RN        "

## 2024-07-10 ENCOUNTER — APPOINTMENT (OUTPATIENT)
Dept: RADIATION ONCOLOGY | Facility: CLINIC | Age: 49
End: 2024-07-10
Attending: RADIOLOGY
Payer: MEDICARE

## 2024-07-10 ENCOUNTER — TELEPHONE (OUTPATIENT)
Dept: ONCOLOGY | Facility: CLINIC | Age: 49
End: 2024-07-10
Payer: MEDICARE

## 2024-07-10 PROCEDURE — 77014 PR CT GUIDE FOR PLACEMENT RADIATION THERAPY FIELDS: CPT | Mod: 26 | Performed by: RADIOLOGY

## 2024-07-10 PROCEDURE — 77386 HC IMRT TREATMENT DELIVERY, COMPLEX: CPT | Performed by: RADIOLOGY

## 2024-07-10 NOTE — TELEPHONE ENCOUNTER
Oral Chemotherapy Monitoring Program  Lab Follow Up    Reviewed lab results from 7/9/24.  Patient was called for monthly assessment, left voice message. No name or medication were mentioned.         4/10/2024     1:00 PM 5/8/2024    12:00 PM 5/13/2024    10:00 AM 5/22/2024    11:00 AM 6/6/2024     4:00 PM 7/8/2024    10:00 AM 7/10/2024     2:00 PM   ORAL CHEMOTHERAPY   Assessment Type Refill Refill Refill Initial Follow up Lab Monitoring;Refill Refill Lab Monitoring;Monthly Follow up   Diagnosis Code Breast Cancer Breast Cancer Breast Cancer Breast Cancer Breast Cancer Breast Cancer Breast Cancer   Providers Dr. Dimitrios Plunkett   [facility]   Is this patient followed by the [facility] OC team? No No No No No No No   Drug Name Verzenio (abemaciclib) Verzenio (abemaciclib) Verzenio (abemaciclib) Verzenio (abemaciclib) Verzenio (abemaciclib) Verzenio (abemaciclib) Verzenio (abemaciclib)   Dose 150 mg 150 mg 100 mg 100 mg 100 mg 100 mg 100 mg   Current Schedule BID BID BID BID BID BID BID   Cycle Details Continuous Continuous Continuous Continuous Continuous Continuous Continuous   Doses missed in last 2 weeks    1      Adherence Assessment    Adherent      Adverse Effects    Fatigue Thrombocytopenia     Fatigue    Grade 2 Grade 1     Pharmacist Intervention(fatigue)    Yes No     Any new drug interactions?     No     Is the dose as ordered appropriate for the patient?     Yes         Labs:  _  Result Component Current Result Ref Range   Sodium 138 (7/9/2024) 135 - 145 mmol/L     _  Result Component Current Result Ref Range   Potassium 4.0 (7/9/2024) 3.4 - 5.3 mmol/L     _  Result Component Current Result Ref Range   Calcium 9.6 (7/9/2024) 8.6 - 10.0 mg/dL     _  Result Component Current Result Ref Range   Magnesium 1.8 (7/1/2024) 1.7 - 2.3 mg/dL     No results found for Phos within last 30  days.     _  Result Component Current Result Ref Range   Albumin 4.0 (7/9/2024) 3.5 - 5.2 g/dL     _  Result Component Current Result Ref Range   Urea Nitrogen 8.4 (7/9/2024) 6.0 - 20.0 mg/dL     _  Result Component Current Result Ref Range   Creatinine 1.08 (H) (7/9/2024) 0.51 - 0.95 mg/dL     _  Result Component Current Result Ref Range   AST 13 (7/9/2024) 0 - 45 U/L     _  Result Component Current Result Ref Range   ALT 9 (7/9/2024) 0 - 50 U/L     _  Result Component Current Result Ref Range   Bilirubin Total 0.3 (7/9/2024) <=1.2 mg/dL     _  Result Component Current Result Ref Range   WBC Count 4.5 (7/9/2024) 4.0 - 11.0 10e3/uL     _  Result Component Current Result Ref Range   Hemoglobin 10.6 (L) (7/9/2024) 11.7 - 15.7 g/dL     _  Result Component Current Result Ref Range   Platelet Count 165 (7/9/2024) 150 - 450 10e3/uL     No results found for ANC within last 30 days.     _  Result Component Current Result Ref Range   Absolute Neutrophils 3.3 (7/9/2024) 1.6 - 8.3 10e3/uL        Assessment & Plan:  No new concerning abnormalities in lab results.  Patient wasn't contacted via [employer] as was seen in infusion.    Follow-Up:  Lab draw with infusion 7/31.    Linda Stafford  Pharmacy Intern  Oral Chemotherapy Monitoring Program  [facility]  [phone]

## 2024-07-11 ENCOUNTER — OFFICE VISIT (OUTPATIENT)
Dept: RADIATION ONCOLOGY | Facility: CLINIC | Age: 49
End: 2024-07-11
Attending: RADIOLOGY
Payer: MEDICARE

## 2024-07-11 VITALS
HEART RATE: 78 BPM | WEIGHT: 238 LBS | SYSTOLIC BLOOD PRESSURE: 120 MMHG | DIASTOLIC BLOOD PRESSURE: 78 MMHG | BODY MASS INDEX: 32.28 KG/M2

## 2024-07-11 DIAGNOSIS — C79.51 CARCINOMA OF LEFT BREAST METASTATIC TO BONE (H): Primary | ICD-10-CM

## 2024-07-11 DIAGNOSIS — C50.912 CARCINOMA OF LEFT BREAST METASTATIC TO BONE (H): Primary | ICD-10-CM

## 2024-07-11 PROCEDURE — 77386 HC IMRT TREATMENT DELIVERY, COMPLEX: CPT | Performed by: RADIOLOGY

## 2024-07-11 PROCEDURE — 77336 RADIATION PHYSICS CONSULT: CPT | Performed by: RADIOLOGY

## 2024-07-11 PROCEDURE — 77427 RADIATION TX MANAGEMENT X5: CPT | Performed by: RADIOLOGY

## 2024-07-11 NOTE — LETTER
2024      Teresa Sanderson  [address]  [facility] [address]      Dear Colleague,    Thank you for referring your patient, Teresa Sanderson, to the [facility] RADIATION ONCOLOGY. Please see a copy of my visit note below.    [facility] PHYSICIANS  SPECIALIZING IN BREAKTHROUGHS  Radiation Oncology    On Treatment Visit Note      Teresa Sanderson      Date: 2024   MRN: [id number]   : 1975  Diagnosis: Metastatic Cancer      Reason for Visit:  On Radiation Treatment Visit     Treatment Summary to Date  Treatment Site: L1 - S1, Left acetabulum Current Dose: 2000/3000 cGy Fractions: 10/15      Chemotherapy  Chemo concurrent with radx?: No    ED Visit/Hospital Admission: none    Treatment Breaks: none    Subjective:     Left SI joint pain slightly better (6/10 today versus 7/10 prior to RT). Fatigue.    Nursing ROS:   Nutrition Alteration  Diet Type: Patient's Preference  Skin  Skin Reaction: 0 - No changes        Cardiovascular  Respiratory effort: 1 - Normal - without distress  Gastrointestinal  Nausea: 0 - None        Pain Assessment  0-10 Pain Scale: 0      Objective:   /78   Pulse 78   Wt 108 kg (238 lb)   BMI 32.28 kg/m    Gen: Appears well, in no acute distress  Skin: No erythema    Labs:  CBC RESULTS:   Recent Labs   Lab Test 24  1419   WBC 4.5   RBC 3.05*   HGB 10.6*   HCT 32.0*   *   MCH 34.8*   MCHC 33.1   RDW 13.4        ELECTROLYTES:  Recent Labs   Lab Test 24  1419      POTASSIUM 4.0   CHLORIDE 103   ANNDO 9.6   CO2 24   BUN 8.4   CR 1.08*   *       Assessment:    Tolerating radiation therapy well.  All questions and concerns addressed.    Toxicities:  Fatigue: Grade 1: Fatigue relieved by rest    Plan:   Continue radiation treatments as planned.    Ports/IGRT reviewed          Medication Review  Med list reviewed with patient?: Yes  Med list printed and given: Offered and declined    Educational Topic  Discussed  Education Instructions: Reviewed side effects        Mike Murillo MD  [phone] clinic       Again, thank you for allowing me to participate in the care of your patient.        Sincerely,        Mike Murillo MD

## 2024-07-11 NOTE — PROGRESS NOTES
[facility] PHYSICIANS  SPECIALIZING IN BREAKTHROUGHS  Radiation Oncology    On Treatment Visit Note      Teresa Sanderson      Date: 2024   MRN: [id number]   : 1975  Diagnosis: Metastatic Cancer      Reason for Visit:  On Radiation Treatment Visit     Treatment Summary to Date  Treatment Site: L1 - S1, Left acetabulum Current Dose: 2000/3000 cGy Fractions: 10/15      Chemotherapy  Chemo concurrent with radx?: No    ED Visit/Hospital Admission: none    Treatment Breaks: none    Subjective:     Left SI joint pain slightly better (6/10 today versus 7/10 prior to RT). Fatigue.    Nursing ROS:   Nutrition Alteration  Diet Type: Patient's Preference  Skin  Skin Reaction: 0 - No changes        Cardiovascular  Respiratory effort: 1 - Normal - without distress  Gastrointestinal  Nausea: 0 - None        Pain Assessment  0-10 Pain Scale: 0      Objective:   /78   Pulse 78   Wt 108 kg (238 lb)   BMI 32.28 kg/m    Gen: Appears well, in no acute distress  Skin: No erythema    Labs:  CBC RESULTS:   Recent Labs   Lab Test 24  1419   WBC 4.5   RBC 3.05*   HGB 10.6*   HCT 32.0*   *   MCH 34.8*   MCHC 33.1   RDW 13.4        ELECTROLYTES:  Recent Labs   Lab Test 24  1419      POTASSIUM 4.0   CHLORIDE 103   NANDO 9.6   CO2 24   BUN 8.4   CR 1.08*   *       Assessment:    Tolerating radiation therapy well.  All questions and concerns addressed.    Toxicities:  Fatigue: Grade 1: Fatigue relieved by rest    Plan:   Continue radiation treatments as planned.    Ports/IGRT reviewed          Medication Review  Med list reviewed with patient?: Yes  Med list printed and given: Offered and declined    Educational Topic Discussed  Education Instructions: Reviewed side effects        Mike Murillo MD  [phone] clinic

## 2024-07-12 ENCOUNTER — APPOINTMENT (OUTPATIENT)
Dept: RADIATION ONCOLOGY | Facility: CLINIC | Age: 49
End: 2024-07-12
Attending: RADIOLOGY
Payer: MEDICARE

## 2024-07-12 PROCEDURE — 77386 HC IMRT TREATMENT DELIVERY, COMPLEX: CPT | Performed by: RADIOLOGY

## 2024-07-12 PROCEDURE — 77014 PR CT GUIDE FOR PLACEMENT RADIATION THERAPY FIELDS: CPT | Mod: 26 | Performed by: RADIOLOGY

## 2024-07-14 ENCOUNTER — HEALTH MAINTENANCE LETTER (OUTPATIENT)
Age: 49
End: 2024-07-14

## 2024-07-15 ENCOUNTER — APPOINTMENT (OUTPATIENT)
Dept: RADIATION ONCOLOGY | Facility: CLINIC | Age: 49
End: 2024-07-15
Attending: RADIOLOGY
Payer: MEDICARE

## 2024-07-15 PROCEDURE — 77386 HC IMRT TREATMENT DELIVERY, COMPLEX: CPT | Performed by: RADIOLOGY

## 2024-07-15 PROCEDURE — 77014 PR CT GUIDE FOR PLACEMENT RADIATION THERAPY FIELDS: CPT | Mod: 26 | Performed by: RADIOLOGY

## 2024-07-16 ENCOUNTER — APPOINTMENT (OUTPATIENT)
Dept: RADIATION ONCOLOGY | Facility: CLINIC | Age: 49
End: 2024-07-16
Attending: RADIOLOGY
Payer: MEDICARE

## 2024-07-16 PROCEDURE — 77386 HC IMRT TREATMENT DELIVERY, COMPLEX: CPT | Performed by: RADIOLOGY

## 2024-07-16 PROCEDURE — 77014 PR CT GUIDE FOR PLACEMENT RADIATION THERAPY FIELDS: CPT | Mod: 26 | Performed by: RADIOLOGY

## 2024-07-17 ENCOUNTER — APPOINTMENT (OUTPATIENT)
Dept: RADIATION ONCOLOGY | Facility: CLINIC | Age: 49
End: 2024-07-17
Attending: RADIOLOGY
Payer: MEDICARE

## 2024-07-17 PROCEDURE — 77014 PR CT GUIDE FOR PLACEMENT RADIATION THERAPY FIELDS: CPT | Mod: 26 | Performed by: RADIOLOGY

## 2024-07-17 PROCEDURE — 77386 HC IMRT TREATMENT DELIVERY, COMPLEX: CPT | Performed by: RADIOLOGY

## 2024-07-18 ENCOUNTER — APPOINTMENT (OUTPATIENT)
Dept: RADIATION ONCOLOGY | Facility: CLINIC | Age: 49
End: 2024-07-18
Attending: RADIOLOGY
Payer: MEDICARE

## 2024-07-18 VITALS
SYSTOLIC BLOOD PRESSURE: 118 MMHG | BODY MASS INDEX: 32.41 KG/M2 | HEART RATE: 74 BPM | DIASTOLIC BLOOD PRESSURE: 74 MMHG | WEIGHT: 239 LBS

## 2024-07-18 DIAGNOSIS — C50.912 CARCINOMA OF LEFT BREAST METASTATIC TO BONE (H): Primary | ICD-10-CM

## 2024-07-18 DIAGNOSIS — C79.51 CARCINOMA OF LEFT BREAST METASTATIC TO BONE (H): Primary | ICD-10-CM

## 2024-07-18 PROCEDURE — 77336 RADIATION PHYSICS CONSULT: CPT | Performed by: RADIOLOGY

## 2024-07-18 PROCEDURE — 77386 HC IMRT TREATMENT DELIVERY, COMPLEX: CPT | Performed by: RADIOLOGY

## 2024-07-18 PROCEDURE — 77427 RADIATION TX MANAGEMENT X5: CPT | Mod: 26 | Performed by: RADIOLOGY

## 2024-07-18 NOTE — LETTER
2024      Teresa Sanderson  [address]  [facility] [address]      Dear Colleague,    Thank you for referring your patient, Teresa Sanderson, to the [facility] RADIATION ONCOLOGY. Please see a copy of my visit note below.    [facility] PHYSICIANS  SPECIALIZING IN BREAKTHROUGHS  Radiation Oncology    On Treatment Visit Note      Teresa Sanderson      Date: 2024   MRN: [id number]   : 1975  Diagnosis: Metastatic Cancer      Reason for Visit:  On Radiation Treatment Visit     Treatment Summary to Date  Treatment Site: L1 - S1, Left acetabulum Current Dose: 3000/3000 cGy Fractions: 15/15      Chemotherapy  Chemo concurrent with radx?: No    ED Visit/Hospital Admission: non    Treatment Breaks: none    Subjective:     Patient feeling well today, although pain is unchanged. She reports that her fatigue is resolved.    Nursing ROS:   Nutrition Alteration  Diet Type: Patient's Preference  Skin  Skin Reaction: 0 - No changes        Cardiovascular  Respiratory effort: 1 - Normal - without distress  Gastrointestinal  Nausea: 0 - None        Pain Assessment  0-10 Pain Scale: 0    Objective:   /74   Pulse 74   Wt 108.4 kg (239 lb)   BMI 32.41 kg/m    Gen: Appears well, in no acute distress  Skin: No erythema    Labs:  CBC RESULTS:   Recent Labs   Lab Test 24  1419   WBC 4.5   RBC 3.05*   HGB 10.6*   HCT 32.0*   *   MCH 34.8*   MCHC 33.1   RDW 13.4        ELECTROLYTES:  Recent Labs   Lab Test 24  1419      POTASSIUM 4.0   CHLORIDE 103   NANDO 9.6   CO2 24   BUN 8.4   CR 1.08*   *       Assessment:    Tolerating radiation therapy well.  All questions and concerns addressed.    Toxicities:  none    Plan:   Last day of RT today, followup in one month.  Ports/IGRT reviewed          Medication Review  Med list reviewed with patient?: Yes  Med list printed and given: Offered and declined    Educational Topic Discussed  Education  Instructions: Reviewed side effects        Mike Murillo MD  [phone] clinic       Again, thank you for allowing me to participate in the care of your patient.        Sincerely,        Mike Murillo MD

## 2024-07-18 NOTE — PROGRESS NOTES
[facility] PHYSICIANS  SPECIALIZING IN BREAKTHROUGHS  Radiation Oncology    On Treatment Visit Note      Teresa Sanderson      Date: 2024   MRN: [id number]   : 1975  Diagnosis: Metastatic Cancer      Reason for Visit:  On Radiation Treatment Visit     Treatment Summary to Date  Treatment Site: L1 - S1, Left acetabulum Current Dose: 3000/3000 cGy Fractions: 15/15      Chemotherapy  Chemo concurrent with radx?: No    ED Visit/Hospital Admission: non    Treatment Breaks: none    Subjective:     Patient feeling well today, although pain is unchanged. She reports that her fatigue is resolved.    Nursing ROS:   Nutrition Alteration  Diet Type: Patient's Preference  Skin  Skin Reaction: 0 - No changes        Cardiovascular  Respiratory effort: 1 - Normal - without distress  Gastrointestinal  Nausea: 0 - None        Pain Assessment  0-10 Pain Scale: 0    Objective:   /74   Pulse 74   Wt 108.4 kg (239 lb)   BMI 32.41 kg/m    Gen: Appears well, in no acute distress  Skin: No erythema    Labs:  CBC RESULTS:   Recent Labs   Lab Test 24  1419   WBC 4.5   RBC 3.05*   HGB 10.6*   HCT 32.0*   *   MCH 34.8*   MCHC 33.1   RDW 13.4        ELECTROLYTES:  Recent Labs   Lab Test 24  1419      POTASSIUM 4.0   CHLORIDE 103   NANDO 9.6   CO2 24   BUN 8.4   CR 1.08*   *       Assessment:    Tolerating radiation therapy well.  All questions and concerns addressed.    Toxicities:  none    Plan:   Last day of RT today, followup in one month.  Ports/IGRT reviewed          Medication Review  Med list reviewed with patient?: Yes  Med list printed and given: Offered and declined    Educational Topic Discussed  Education Instructions: Reviewed side effects        Mike Murillo MD  [phone] [facility]

## 2024-07-19 ENCOUNTER — VIRTUAL VISIT (OUTPATIENT)
Dept: PSYCHIATRY | Facility: CLINIC | Age: 49
End: 2024-07-19
Attending: PSYCHIATRY & NEUROLOGY
Payer: MEDICARE

## 2024-07-19 VITALS — WEIGHT: 230 LBS | BODY MASS INDEX: 32.2 KG/M2 | HEIGHT: 71 IN

## 2024-07-19 DIAGNOSIS — Z63.4 BEREAVEMENT: ICD-10-CM

## 2024-07-19 DIAGNOSIS — F25.0 SCHIZOAFFECTIVE DISORDER, BIPOLAR TYPE (H): Primary | ICD-10-CM

## 2024-07-19 DIAGNOSIS — F41.9 ANXIETY DISORDER, UNSPECIFIED TYPE: ICD-10-CM

## 2024-07-19 PROCEDURE — 90792 PSYCH DIAG EVAL W/MED SRVCS: CPT | Mod: 95

## 2024-07-19 SDOH — SOCIAL STABILITY - SOCIAL INSECURITY: DISSAPEARANCE AND DEATH OF FAMILY MEMBER: Z63.4

## 2024-07-19 ASSESSMENT — PAIN SCALES - GENERAL: PAINLEVEL: SEVERE PAIN (6)

## 2024-07-19 NOTE — NURSING NOTE
Current patient location: [address]   [facility] [address]    Is the patient currently in the state of [place]? YES    Visit mode:VIDEO    If the visit is dropped, the patient can be reconnected by: VIDEO VISIT: Text to cell phone:   Telephone Information:   Mobile [phone]       Will anyone else be joining the visit? NO  (If patient encounters technical issues they should call [phone])    How would you like to obtain your AVS? MyChart    Are changes needed to the allergy or medication list? No    Are refills needed on medications prescribed by this physician? NO    Reason for visit: RECHECK    Wendy CUELLAR

## 2024-07-19 NOTE — PATIENT INSTRUCTIONS
**For crisis resources, please see the information at the end of this document**   Patient Education    Thank you for coming to the [facility] MENTAL HEALTH & ADDI[facility] [place] [facility].     Medications:   -Increase Sertraline to 100mg daily   -Continue Quetiapine 400mg at bedtime   -Continue Quetiapine 25-50mg BID PRN     Reach out to [facility] service at the number provided during our visit to be connected with a therapist     Lab Testing:  If you had lab testing today and your results are reassuring or normal they will be mailed to you or sent through [employer] within 7 days. If the lab tests need quick action we will call you with the results. The phone number we will call with results is # [phone]. If this is not the best number please call our clinic and change the number.     Medication Refills:  If you need any refills please call your pharmacy and they will contact us. Our fax number for refills is [phone].   Three business days of notice are needed for general medication refill requests.   Five business days of notice are needed for controlled substance refill requests.   If you need to change to a different pharmacy, please contact the new pharmacy directly. The new pharmacy will help you get your medications transferred.     Contact Us:  Please call [phone] during business hours (8-5:00 M-F).   If you have medication related questions after clinic hours, or on the weekend, please call [phone].     Financial Assistance [phone]   Medical Records [phone]       MENTAL HEALTH CRISIS RESOURCES:  For a emergency help, please call 911 or go to the nearest Emergency Department.     Emergency Walk-In Options:   EmPATH Unit @ [facility] ([place]): [phone] - Specialized mental health emergency area designed to be calming  [facility] ([place]): [phone]  [facility] ([place]): [phone]  [facility]): [phone]    [facility] Crisis Information:   [place]: [phone]  [place]: [phone]  Kayla SOMMERS) - Adult: [phone]     Child: [phone]  [place] - Adult: [phone]     Child: [phone]  [place]: [phone]  List of all [facility] resources:   https://[place].[facility]/dhs/people-we-serve/adults/health-care/mental-health/resources/crisis-contacts.jsp    National Crisis Information:   Crisis Text Line: Text  [place]  to [address]65545  Suicide & Crisis Lifeline: 988  National Suicide Prevention Lifeline: 3-228-463-WWXP (1-829.130.4281)       For online chat options, visit https://suicidepreventionlifeline.org/chat/  Poison Control Center: 1-967.317.5352  Trans Lifeline: 1-915.911.8322 - Hotline for transgender people of all ages  [employer]: 2-207-156-4179 - Hotline for LGBT youth     For Non-Emergency Support:   Fast Tracker: Mental Health & Substance Use Disorder Resources -   https://www.[employer].org/

## 2024-07-19 NOTE — PROGRESS NOTES
"Virtual Visit Details    Type of service:  Video Visit   Video Start Time: {video visit start/end time for provider to select:244239}  Video End Time:{video visit start/end time for provider to select:307016}    Originating Location (pt. Location): {video visit patient location:698067::\"Home\"}  {PROVIDER LOCATION On-site should be selected for visits conducted from your clinic location or adjoining [facility] hospital, academic office, or other nearby [facility] building. Off-site should be selected for all other provider locations, including home:581474}  Distant Location (provider location):  {virtual location provider:026193}  Platform used for Video Visit: {Virtual Visit Platforms:403389::\"VODECLIC\"}  "
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
## 2024-07-20 RX ORDER — SERTRALINE HYDROCHLORIDE 100 MG/1
100 TABLET, FILM COATED ORAL DAILY
Qty: 30 TABLET | Refills: 2 | Status: SHIPPED | OUTPATIENT
Start: 2024-07-20 | End: 2024-09-10

## 2024-07-20 RX ORDER — QUETIAPINE FUMARATE 50 MG/1
25-50 TABLET, FILM COATED ORAL 2 TIMES DAILY PRN
Qty: 60 TABLET | Refills: 2 | Status: SHIPPED | OUTPATIENT
Start: 2024-07-20

## 2024-07-20 RX ORDER — QUETIAPINE FUMARATE 400 MG/1
400 TABLET, FILM COATED ORAL AT BEDTIME
Qty: 30 TABLET | Refills: 2 | Status: SHIPPED | OUTPATIENT
Start: 2024-07-20

## 2024-07-23 ENCOUNTER — TELEPHONE (OUTPATIENT)
Dept: ONCOLOGY | Facility: CLINIC | Age: 49
End: 2024-07-23
Payer: MEDICARE

## 2024-07-23 NOTE — TELEPHONE ENCOUNTER
Oral Chemotherapy Monitoring Program    Subjective/Objective:  Teresa Sanderson is a 48 year old female contacted by phone for a follow-up visit for oral chemotherapy. The patient confirmed taking 1 tablet (100mg) by mouth twice daily. The patient has missed four doses in the last two weeks due to forgetting and being unable to wake up in the morning to take the first dose. The patient has not started any new prescription medications, but takes tylenol daily for cancer related pains.    The patient reported a side effect of diarrhea/loose stools and experiences this about three to four times per week and has two to three loose stools on days this side effect occurs. I recommended Loperamide to the patient, but they thought that it wasn't bad enough at this time to add another medication. The patient reported a side effect of fatigue/decreased energy levels as a result of this medication, too. The patient experiences this every day, making it hard for her to get up in the mornings and begin her day. The patient said they have to take a nap when the fatigue is bad during the day, and I recommended exercise to help with this side effect. Overall, the patient seems to be tolerating this medication well.        5/8/2024    12:00 PM 5/13/2024    10:00 AM 5/22/2024    11:00 AM 6/6/2024     4:00 PM 7/8/2024    10:00 AM 7/10/2024     2:00 PM 7/23/2024    12:00 PM   ORAL CHEMOTHERAPY   Assessment Type Refill Refill Initial Follow up Lab Monitoring;Refill Refill Lab Monitoring;Monthly Follow up Monthly Follow up   Diagnosis Code Breast Cancer Breast Cancer Breast Cancer Breast Cancer Breast Cancer Breast Cancer Breast Cancer   Providers Dr. Dimitrios Plunkett   [facility]   Is this patient followed by the [facility] team? No No No No No No No   Drug Name Verzenio (abemaciclib) Verzenio  "(abemaciclib) Verzenio (abemaciclib) Verzenio (abemaciclib) Verzenio (abemaciclib) Verzenio (abemaciclib) Verzenio (abemaciclib)   Dose 150 mg 100 mg 100 mg 100 mg 100 mg 100 mg 100 mg   Current Schedule BID BID BID BID BID BID BID   Cycle Details Continuous Continuous Continuous Continuous Continuous Continuous Continuous   Doses missed in last 2 weeks   1    4   Adherence Assessment   Adherent    Non-adherent   Reason for Non-adherence       Patient forgets   Adverse Effects   Fatigue Thrombocytopenia      Diarrhea       Grade 2   Pharmacist Intervention(diarrhea)       No   Fatigue   Grade 2 Grade 1   Grade 3   Pharmacist Intervention(fatigue)   Yes No   Yes   Intervention(s)       Patient education   Any new drug interactions?    No      Is the dose as ordered appropriate for the patient?    Yes          Last PHQ-2 Score on record:       7/19/2024     1:50 PM 2/23/2024     2:48 PM   PHQ-2 ( 1999 Pfizer)   Q1: Little interest or pleasure in doing things 0 2   Q2: Feeling down, depressed or hopeless 1 1   PHQ-2 Score 1 3   PHQ-2 Total Score (12-17 Years)- Positive if 3 or more points; Administer PHQ-A if positive 1 3       Vitals:  BP:   BP Readings from Last 1 Encounters:   07/18/24 118/74     Wt Readings from Last 1 Encounters:   07/18/24 108.4 kg (239 lb)     Estimated body surface area is 2.29 meters squared as calculated from the following:    Height as of 7/19/24: 1.803 m (5' 11\").    Weight as of 7/19/24: 104.3 kg (230 lb).    Labs:  _  Result Component Current Result Ref Range   Sodium 138 (7/9/2024) 135 - 145 mmol/L     _  Result Component Current Result Ref Range   Potassium 4.0 (7/9/2024) 3.4 - 5.3 mmol/L     _  Result Component Current Result Ref Range   Calcium 9.6 (7/9/2024) 8.6 - 10.0 mg/dL     _  Result Component Current Result Ref Range   Magnesium 1.8 (7/1/2024) 1.7 - 2.3 mg/dL     No results found for Phos within last 30 days.     _  Result Component Current Result Ref Range   Albumin 4.0 " (7/9/2024) 3.5 - 5.2 g/dL     _  Result Component Current Result Ref Range   Urea Nitrogen 8.4 (7/9/2024) 6.0 - 20.0 mg/dL     _  Result Component Current Result Ref Range   Creatinine 1.08 (H) (7/9/2024) 0.51 - 0.95 mg/dL     _  Result Component Current Result Ref Range   AST 13 (7/9/2024) 0 - 45 U/L     _  Result Component Current Result Ref Range   ALT 9 (7/9/2024) 0 - 50 U/L     _  Result Component Current Result Ref Range   Bilirubin Total 0.3 (7/9/2024) <=1.2 mg/dL     _  Result Component Current Result Ref Range   WBC Count 4.5 (7/9/2024) 4.0 - 11.0 10e3/uL     _  Result Component Current Result Ref Range   Hemoglobin 10.6 (L) (7/9/2024) 11.7 - 15.7 g/dL     _  Result Component Current Result Ref Range   Platelet Count 165 (7/9/2024) 150 - 450 10e3/uL     No results found for ANC within last 30 days.     _  Result Component Current Result Ref Range   Absolute Neutrophils 3.3 (7/9/2024) 1.6 - 8.3 10e3/uL        Assessment/Plan:  Continue Verzenio therapy as planned. No concerns and the patient did not have any questions for me.    Follow-Up:  7/31 labs with infusion.  8/5 appointment with Rita.    Refill Due:  ~8/7. The patient has enough pills on hand to make it until then.    Carito Hopkins  Pharmacy Intern  Oral Chemotherapy Monitoring Program  [facility]  [phone]

## 2024-07-24 ENCOUNTER — MYC REFILL (OUTPATIENT)
Dept: ONCOLOGY | Facility: CLINIC | Age: 49
End: 2024-07-24
Payer: MEDICARE

## 2024-07-24 DIAGNOSIS — Z17.0 MALIGNANT NEOPLASM OF OVERLAPPING SITES OF LEFT BREAST IN FEMALE, ESTROGEN RECEPTOR POSITIVE (H): ICD-10-CM

## 2024-07-24 DIAGNOSIS — G89.3 CANCER ASSOCIATED PAIN: ICD-10-CM

## 2024-07-24 DIAGNOSIS — C50.812 MALIGNANT NEOPLASM OF OVERLAPPING SITES OF LEFT BREAST IN FEMALE, ESTROGEN RECEPTOR POSITIVE (H): ICD-10-CM

## 2024-07-24 RX ORDER — OXYCODONE HYDROCHLORIDE 5 MG/1
5-10 TABLET ORAL EVERY 4 HOURS PRN
Qty: 90 TABLET | Refills: 0 | Status: SHIPPED | OUTPATIENT
Start: 2024-07-24 | End: 2024-08-15

## 2024-07-24 NOTE — TELEPHONE ENCOUNTER
Received Youtuot message from patient requesting refill of oxycodone.     Last refill: 6/24/24  Last office visit: 6/24/24  Scheduled for follow up 8/15/24     Will route request to MD for review.     Reviewed [place]  Report.

## 2024-07-25 ENCOUNTER — THERAPY VISIT (OUTPATIENT)
Dept: PHYSICAL THERAPY | Facility: CLINIC | Age: 49
End: 2024-07-25
Attending: RADIOLOGY
Payer: MEDICARE

## 2024-07-25 DIAGNOSIS — G89.3 CANCER ASSOCIATED PAIN: Primary | ICD-10-CM

## 2024-07-25 DIAGNOSIS — C79.51 MALIGNANT NEOPLASM METASTATIC TO BONE (H): ICD-10-CM

## 2024-07-25 PROCEDURE — 97535 SELF CARE MNGMENT TRAINING: CPT | Mod: GP | Performed by: PHYSICAL THERAPIST

## 2024-07-25 PROCEDURE — 97162 PT EVAL MOD COMPLEX 30 MIN: CPT | Mod: GP | Performed by: PHYSICAL THERAPIST

## 2024-07-25 PROCEDURE — 97110 THERAPEUTIC EXERCISES: CPT | Mod: GP | Performed by: PHYSICAL THERAPIST

## 2024-07-25 NOTE — PROGRESS NOTES
PHYSICAL THERAPY EVALUATION  Type of Visit: Evaluation       Fall Risk Screen:  Fall screen completed by: PT  Have you fallen 2 or more times in the past year?: Yes  Have you fallen and had an injury in the past year?: Yes  Timed Up and Go score (seconds): 14.34  Is patient a fall risk?: Yes  Fall screen comments: like the leg will give out, but does slip in the shower    Subjective       Presenting condition or subjective complaint:  My lower back and left hip and leg pain, no energy  Date of onset: 01/01/23    Relevant medical history:  metastatic breast cancer dx 12/2020; widespread bone mets. S/p RT to lumbar spine and RFA/kyphoplasty L4 5/2021.   Ibrance and anastrazole since 1/2021 completed radiation   3000cGy to L1-S1/L acetabulum on 7/18/24 (doesn't feel like it helped.  Depression, Mental Illness, OA, pain at night/rest, and vision problem  Dates & types of surgery:  back surgery as noted in 2021 and hysterectcomy    SPINAL METASTASIS    Prior therapy history for the same diagnosis, illness or injury:    Had some after back surgery but it go to painful    Prior Level of Function  Transfers: Independent  Ambulation: Independent  ADL: Independent  IADL: Childcare, Housekeeping, Laundry, Meal preparation, Medication management, Work  Managed a [employer]    Living Environment  Social support:   family members at home  Type of home:   apartment  Stairs to enter the home:       elevator but pt on 4th floor and took 45 minutes the one time it went down for her to get back up  Help at home:  family with cooking/cleaning  Equipment owned:   walker    Employment:    no  Hobbies/Interests:  reading     Patient goals for therapy:  Be able to go for longer walks and maybe get a part time job  Be able to walk down to the corner of the street to get the [place], be able to walk in the park  Pain assessment: Pain present, Back mostly, pain rolling down the L LE  No oxycodone for 3 days, rates pain about 6/10 when  sitting but worse when walking  Fatigue: FACIT-F 17     Objective   Vitals Signs  Heart Rate: 69  SpO2: 97  Blood Pressure: 130/83  Weight: 230  Vital Signs Comments: reports that her previous weight was around 250  Cognitive Status Examination  Orientation: Oriented to person, place and time   Level of Consciousness: Alert  Follows Commands and Answers Questions: 100% of the time  Personal Safety and Judgement: Intact  Memory: Intact    OBSERVATION: slow antalgic gait but pt is upright   INTEGUMENTARY: Intact  POSTURE:  forward head, slight increase in lordosis, weight on R LE in standing  RANGE OF MOTION: LE ROM WFL, neural tension with seated knee ext on the L  STRENGTH:  L LE weakness, hip flexor 3-, knee extension 3-    BED MOBILITY: Independent    TRANSFERS: Independent, uses walker in the morning    GAIT: Independent today but uses a walker when she first gets up in the morning  Level of [place]: SBA  Assistive Device(s): Walker (four wheeled)  Gait Deviations: Stride length decreased  Clearance decreased bilaterally due to L LE weakness with swing and stance  Gait Distance: 726' in 6MWT    BALANCE: Standing Balance (static):Poor,    Static Wide MURRAY: no restriction but leans slightly R  Static Narrow MURRAY: holds 30s with definite lean to R  Wide Eye closed: 5s then fatigue  R SLS: attempts <3s  L SLS: tries but not pain and weakness limited  TU.34    SENSATION:  Light touch: R intact, L intact but reports that the ankle area feels numb. L anlke proprioception intact.      REFLEXES: not tested today  COORDINATION: grossly normal  MUSCLE TONE: WNL    Assessment & Plan   CLINICAL IMPRESSIONS  Medical Diagnosis: Malignant neoplasm of overlapping sites of left breast in female, estrogen receptor positive (H)  Cancer associated pain  Back pain with history of spinal surgery  Metastasis to bone (H)  Muscle spasm  Pain of right lower leg    Treatment Diagnosis:     Impression/Assessment: Patient is a 48 year  old female with pain and fatigue complaints.  The following significant findings have been identified: Pain, Decreased ROM/flexibility, Decreased strength, Impaired balance, Impaired sensation, Impaired gait, Impaired muscle performance, and Decreased activity tolerance. These impairments interfere with their ability to perform self care tasks, work tasks, recreational activities, household chores, household mobility, and community mobility as compared to previous level of function. Also fall history    Clinical Decision Making (Complexity):  Clinical Presentation: Evolving/Changing  Clinical Presentation Rationale: based on medical and personal factors listed in PT evaluation  Clinical Decision Making (Complexity): Moderate complexity    PLAN OF CARE  Treatment Interventions:  Modalities:  per therapist discretion  Interventions: Gait Training, Manual Therapy, Neuromuscular Re-education, Therapeutic Activity, Therapeutic Exercise, Self-Care/Home Management    Long Term Goals     PT Goal 1  Goal Identifier: HEP  Goal Description: Pt will be independent with HEP for L LE that does not increase her low back or L LE pain in order to be safer with her mobility both in and outside of the home  Rationale: to maximize safety and independence within the home;to maximize safety and independence within the community  Target Date: 09/22/24  PT Goal 2  Goal Identifier: Fall  Goal Description: Pt will decrease TUG without device to less than 13.5s and report 2 months free of falls to prevent fracture or othehr injury  Rationale: to maximize safety and independence within the home  Target Date: 09/22/24  PT Goal 3  Goal Identifier: Endurance/Activity Tolerance  Goal Description: Pt will increase her 6MWT distance to 250m (greater than 10% increase) to be able to walk to the park with her grandchildren without fear of not making it or needing to sit.  Rationale: to maximize safety and independence with transportation  Target Date:  10/22/24  PT Goal 4  Goal Identifier: Fatigue  Goal Description: Pt will use strategies of pacing, exercise, sleep habits and good nutrition to increase her facit score by 10 points or more to at least 27  Rationale: to maximize safety and independence within the community  Target Date: 10/22/24      Frequency of Treatment: 1x/week  Duration of Treatment: 12 weeks    Recommended Referrals to Other Professionals: Occupational Therapy, will talk to patient about this for fatigue, cognition  Education Assessment:   Learner/Method: Patient  Education Comments: fatigue, falls, plan of care    Risks and benefits of evaluation/treatment have been explained.   Patient/Family/caregiver agrees with Plan of Care.     Evaluation Time:     PT Eval, Moderate Complexity Minutes (13154): 30       Signing Clinician: Carolyn Espinoza, PT        [facility]                                                                                   OUTPATIENT PHYSICAL THERAPY      PLAN OF TREATMENT FOR OUTPATIENT REHABILITATION   Patient's Last Name, First Name, Teresa Mahmood    YOB: 1975   Provider's Name   [facility]   Medical Record No.  [id number]     Onset Date: 01/01/23  Start of Care Date: 07/25/24     Medical Diagnosis:  Malignant neoplasm of overlapping sites of left breast in female, estrogen receptor positive (H)  Cancer associated pain  Back pain with history of spinal surgery  Metastasis to bone (H)  Muscle spasm  Pain of right lower leg      PT Treatment Diagnosis:    Plan of Treatment  Frequency/Duration: 1x/week/ 12 weeks    Certification date from 07/25/24 to 10/22/24         See note for plan of treatment details and functional goals     Carolyn Espinoza, PT                         I CERTIFY THE NEED FOR THESE SERVICES FURNISHED UNDER        THIS PLAN OF TREATMENT AND WHILE UNDER MY CARE     (Physician attestation of this document indicates review  and certification of the therapy plan).              Referring Provider:  Ayanna Tellez    Initial Assessment  See Epic Evaluation- Start of Care Date: 07/25/24

## 2024-07-26 PROBLEM — G89.3 CANCER ASSOCIATED PAIN: Status: ACTIVE | Noted: 2024-07-26

## 2024-07-26 PROBLEM — C79.51 MALIGNANT NEOPLASM METASTATIC TO BONE (H): Status: ACTIVE | Noted: 2024-07-26

## 2024-07-28 RX ORDER — LAMOTRIGINE 25 MG/1
500 TABLET ORAL ONCE
Status: CANCELLED | OUTPATIENT
Start: 2024-08-05 | End: 2024-08-05

## 2024-07-31 DIAGNOSIS — C50.812 MALIGNANT NEOPLASM OF OVERLAPPING SITES OF LEFT BREAST IN FEMALE, ESTROGEN RECEPTOR POSITIVE (H): ICD-10-CM

## 2024-07-31 DIAGNOSIS — C50.912 CARCINOMA OF LEFT BREAST METASTATIC TO BONE (H): Primary | ICD-10-CM

## 2024-07-31 DIAGNOSIS — C79.51 CARCINOMA OF LEFT BREAST METASTATIC TO BONE (H): Primary | ICD-10-CM

## 2024-07-31 DIAGNOSIS — Z17.0 MALIGNANT NEOPLASM OF OVERLAPPING SITES OF LEFT BREAST IN FEMALE, ESTROGEN RECEPTOR POSITIVE (H): ICD-10-CM

## 2024-08-01 ENCOUNTER — THERAPY VISIT (OUTPATIENT)
Dept: PHYSICAL THERAPY | Facility: CLINIC | Age: 49
End: 2024-08-01
Attending: STUDENT IN AN ORGANIZED HEALTH CARE EDUCATION/TRAINING PROGRAM
Payer: MEDICARE

## 2024-08-01 ENCOUNTER — ONCOLOGY VISIT (OUTPATIENT)
Dept: RADIATION ONCOLOGY | Facility: CLINIC | Age: 49
End: 2024-08-01

## 2024-08-01 DIAGNOSIS — C79.51 MALIGNANT NEOPLASM METASTATIC TO BONE (H): ICD-10-CM

## 2024-08-01 DIAGNOSIS — C50.812 MALIGNANT NEOPLASM OF OVERLAPPING SITES OF LEFT BREAST IN FEMALE, ESTROGEN RECEPTOR POSITIVE (H): Primary | ICD-10-CM

## 2024-08-01 DIAGNOSIS — Z17.0 MALIGNANT NEOPLASM OF OVERLAPPING SITES OF LEFT BREAST IN FEMALE, ESTROGEN RECEPTOR POSITIVE (H): Primary | ICD-10-CM

## 2024-08-01 DIAGNOSIS — G89.3 CANCER ASSOCIATED PAIN: ICD-10-CM

## 2024-08-01 DIAGNOSIS — M79.661 PAIN OF RIGHT LOWER LEG: ICD-10-CM

## 2024-08-01 PROCEDURE — 97110 THERAPEUTIC EXERCISES: CPT | Mod: GP | Performed by: PHYSICAL THERAPIST

## 2024-08-01 PROCEDURE — 97535 SELF CARE MNGMENT TRAINING: CPT | Mod: GP | Performed by: PHYSICAL THERAPIST

## 2024-08-01 NOTE — LETTER
8/1/2024      Teresa Sanderson  [address]  [facility] [address]      Dear Colleague,    Thank you for referring your patient, Teresa Sanderson, to the [facility] RADIATION ONCOLOGY. Please see a copy of my visit note below.    No notes on file    Again, thank you for allowing me to participate in the care of your patient.        Sincerely,        Mike Murillo MD

## 2024-08-02 NOTE — PROCEDURES
Radiotherapy Treatment Summary          Date of Report: 2024     PATIENT: RYANN SANDERSON  MEDICAL RECORD NO: [id number]  : 1975     DIAGNOSIS: C79.51 Secondary malignant neoplasm of bone (primary breast cancer)  INTENT OF RADIOTHERAPY: Palliation     Details of the treatments summarized below are found in records kept in the Department of Radiation Oncology at [facility].     Treatment Summary:  Radiation Oncology - Course: 3   Treatment Site Modality Dose (cGy) per Fraction Number of Fractions Total Dose (cGy) Dates of Treatment Elapsed Days   4 Lt Acetabulum and SI re-tx  06 X 200 15 3000 2024 to 2024  21          COMMENTS:                      Ms. Sanderson underwent palliative radiotherapy to her left acetabulum and SI joint. Both these areas were   previously irradiated. She reported minimal side effect with the current radiation regimen. Pain was minimally   improved from 7/10 to 6/10 by the end of her treatment course. Hopefully she will experience more pain relief   over the next few weeks.     ED visits/hospitalizations: none     Missed treatments: none     Acute Toxicity Profile by CTC v5.0: none     PAIN MANAGEMENT: Continue pre-RT regimen, pain regimen was not changed during RT course                            FOLLOW UP PLAN:                         RTC for followup in one month.     Staff Physician: Mike Murillo M.D.     CC: Ayanna Becerra MD              Radiation Oncology:  [facility] [address] [place] [address]

## 2024-08-05 ENCOUNTER — ONCOLOGY VISIT (OUTPATIENT)
Dept: ONCOLOGY | Facility: CLINIC | Age: 49
End: 2024-08-05
Attending: PHYSICIAN ASSISTANT
Payer: MEDICARE

## 2024-08-05 ENCOUNTER — LAB (OUTPATIENT)
Dept: LAB | Facility: CLINIC | Age: 49
End: 2024-08-05
Attending: PHYSICIAN ASSISTANT
Payer: MEDICARE

## 2024-08-05 VITALS
BODY MASS INDEX: 32.83 KG/M2 | SYSTOLIC BLOOD PRESSURE: 147 MMHG | TEMPERATURE: 98.3 F | WEIGHT: 235.4 LBS | OXYGEN SATURATION: 98 % | RESPIRATION RATE: 18 BRPM | HEART RATE: 68 BPM | DIASTOLIC BLOOD PRESSURE: 93 MMHG

## 2024-08-05 DIAGNOSIS — Z17.0 MALIGNANT NEOPLASM OF OVERLAPPING SITES OF LEFT BREAST IN FEMALE, ESTROGEN RECEPTOR POSITIVE (H): Primary | ICD-10-CM

## 2024-08-05 DIAGNOSIS — C50.812 MALIGNANT NEOPLASM OF OVERLAPPING SITES OF LEFT BREAST IN FEMALE, ESTROGEN RECEPTOR POSITIVE (H): ICD-10-CM

## 2024-08-05 DIAGNOSIS — C50.912 CARCINOMA OF LEFT BREAST METASTATIC TO BONE (H): ICD-10-CM

## 2024-08-05 DIAGNOSIS — C79.51 CARCINOMA OF LEFT BREAST METASTATIC TO BONE (H): ICD-10-CM

## 2024-08-05 DIAGNOSIS — C50.812 MALIGNANT NEOPLASM OF OVERLAPPING SITES OF LEFT BREAST IN FEMALE, ESTROGEN RECEPTOR POSITIVE (H): Primary | ICD-10-CM

## 2024-08-05 DIAGNOSIS — Z17.0 MALIGNANT NEOPLASM OF OVERLAPPING SITES OF LEFT BREAST IN FEMALE, ESTROGEN RECEPTOR POSITIVE (H): ICD-10-CM

## 2024-08-05 DIAGNOSIS — Z91.041 ALLERGIC TO IV CONTRAST: ICD-10-CM

## 2024-08-05 LAB
ALBUMIN SERPL BCG-MCNC: 3.9 G/DL (ref 3.5–5.2)
ALP SERPL-CCNC: 75 U/L (ref 40–150)
ALT SERPL W P-5'-P-CCNC: 12 U/L (ref 0–50)
ANION GAP SERPL CALCULATED.3IONS-SCNC: 8 MMOL/L (ref 7–15)
AST SERPL W P-5'-P-CCNC: 16 U/L (ref 0–45)
BASOPHILS # BLD AUTO: 0 10E3/UL (ref 0–0.2)
BASOPHILS NFR BLD AUTO: 1 %
BILIRUB SERPL-MCNC: 0.2 MG/DL
BUN SERPL-MCNC: 8.4 MG/DL (ref 6–20)
CALCIUM SERPL-MCNC: 9.4 MG/DL (ref 8.8–10.4)
CANCER AG15-3 SERPL-ACNC: 55 U/ML
CEA SERPL-MCNC: 4.6 NG/ML
CHLORIDE SERPL-SCNC: 107 MMOL/L (ref 98–107)
CREAT SERPL-MCNC: 1.05 MG/DL (ref 0.51–0.95)
EGFRCR SERPLBLD CKD-EPI 2021: 65 ML/MIN/1.73M2
EOSINOPHIL # BLD AUTO: 0.1 10E3/UL (ref 0–0.7)
EOSINOPHIL NFR BLD AUTO: 3 %
ERYTHROCYTE [DISTWIDTH] IN BLOOD BY AUTOMATED COUNT: 13.8 % (ref 10–15)
GLUCOSE SERPL-MCNC: 116 MG/DL (ref 70–99)
HCO3 SERPL-SCNC: 28 MMOL/L (ref 22–29)
HCT VFR BLD AUTO: 30.9 % (ref 35–47)
HGB BLD-MCNC: 10.1 G/DL (ref 11.7–15.7)
IMM GRANULOCYTES # BLD: 0 10E3/UL
IMM GRANULOCYTES NFR BLD: 0 %
LYMPHOCYTES # BLD AUTO: 0.7 10E3/UL (ref 0.8–5.3)
LYMPHOCYTES NFR BLD AUTO: 24 %
MCH RBC QN AUTO: 34.1 PG (ref 26.5–33)
MCHC RBC AUTO-ENTMCNC: 32.7 G/DL (ref 31.5–36.5)
MCV RBC AUTO: 104 FL (ref 78–100)
MONOCYTES # BLD AUTO: 0.2 10E3/UL (ref 0–1.3)
MONOCYTES NFR BLD AUTO: 6 %
NEUTROPHILS # BLD AUTO: 2 10E3/UL (ref 1.6–8.3)
NEUTROPHILS NFR BLD AUTO: 66 %
NRBC # BLD AUTO: 0 10E3/UL
NRBC BLD AUTO-RTO: 0 /100
PLATELET # BLD AUTO: 186 10E3/UL (ref 150–450)
POTASSIUM SERPL-SCNC: 3.6 MMOL/L (ref 3.4–5.3)
PROT SERPL-MCNC: 7 G/DL (ref 6.4–8.3)
RBC # BLD AUTO: 2.96 10E6/UL (ref 3.8–5.2)
SODIUM SERPL-SCNC: 143 MMOL/L (ref 135–145)
WBC # BLD AUTO: 3 10E3/UL (ref 4–11)

## 2024-08-05 PROCEDURE — 36415 COLL VENOUS BLD VENIPUNCTURE: CPT

## 2024-08-05 PROCEDURE — 82378 CARCINOEMBRYONIC ANTIGEN: CPT

## 2024-08-05 PROCEDURE — 250N000011 HC RX IP 250 OP 636: Performed by: INTERNAL MEDICINE

## 2024-08-05 PROCEDURE — 86300 IMMUNOASSAY TUMOR CA 15-3: CPT

## 2024-08-05 PROCEDURE — 85048 AUTOMATED LEUKOCYTE COUNT: CPT

## 2024-08-05 PROCEDURE — 96402 CHEMO HORMON ANTINEOPL SQ/IM: CPT | Performed by: PHYSICIAN ASSISTANT

## 2024-08-05 PROCEDURE — G0463 HOSPITAL OUTPT CLINIC VISIT: HCPCS | Performed by: PHYSICIAN ASSISTANT

## 2024-08-05 PROCEDURE — 99215 OFFICE O/P EST HI 40 MIN: CPT | Performed by: PHYSICIAN ASSISTANT

## 2024-08-05 PROCEDURE — 80053 COMPREHEN METABOLIC PANEL: CPT

## 2024-08-05 RX ORDER — HEPARIN SODIUM,PORCINE 10 UNIT/ML
5 VIAL (ML) INTRAVENOUS
Status: CANCELLED | OUTPATIENT
Start: 2024-09-04

## 2024-08-05 RX ORDER — LAMOTRIGINE 25 MG/1
500 TABLET ORAL ONCE
Status: CANCELLED | OUTPATIENT
Start: 2024-09-02 | End: 2024-09-02

## 2024-08-05 RX ORDER — ZOLEDRONIC ACID 0.04 MG/ML
4 INJECTION, SOLUTION INTRAVENOUS ONCE
Status: CANCELLED | OUTPATIENT
Start: 2024-09-04 | End: 2024-09-04

## 2024-08-05 RX ORDER — LAMOTRIGINE 25 MG/1
500 TABLET ORAL ONCE
Status: CANCELLED | OUTPATIENT
Start: 2024-10-01 | End: 2024-09-30

## 2024-08-05 RX ORDER — HEPARIN SODIUM (PORCINE) LOCK FLUSH IV SOLN 100 UNIT/ML 100 UNIT/ML
5 SOLUTION INTRAVENOUS
Status: CANCELLED | OUTPATIENT
Start: 2024-09-04

## 2024-08-05 RX ORDER — METHYLPREDNISOLONE 32 MG/1
TABLET ORAL
Qty: 2 TABLET | Refills: 3 | Status: SHIPPED | OUTPATIENT
Start: 2024-08-05

## 2024-08-05 RX ORDER — LAMOTRIGINE 25 MG/1
500 TABLET ORAL ONCE
Status: COMPLETED | OUTPATIENT
Start: 2024-08-05 | End: 2024-08-05

## 2024-08-05 RX ADMIN — FULVESTRANT 500 MG: 50 INJECTION, SOLUTION INTRAMUSCULAR at 14:47

## 2024-08-05 ASSESSMENT — PAIN SCALES - GENERAL: PAINLEVEL: MODERATE PAIN (5)

## 2024-08-05 NOTE — LETTER
8/5/2024      Teresa Sanderson  [address]  [place] [address]      Dear Colleague,    Thank you for referring your patient, Teresa Sanderson, to the [facility]. Please see a copy of my visit note below.    Oncology Visit:   Date on this visit: Aug 5, 2024    Diagnosis:  ER positive left breast cancer metastasized to bones.     Primary Physician: No Ref-Primary, Physician     History Of Present Illness:    Ms. Sanderson is a 48 year old female with a h/o tobacco abuse and DVTs with left breast cancer metastasized to bone. She presented to [facility] ED with back pain on 12/5/2020. MRI of the L-spine showed an abnormal L4 lesion with associated right paraspinal mass, abnormalities in L5 and the left iliac bone were also seen. CT C/A/P showed a left breast mass, lytic lesions of T7, L4, and the pelvis, and a 3 cm lesion in the kidney (thought to be a cyst). Ultrasound of the bilateral lower extremities showed a non-occlusive thrombus in the left popliteal vein. Mammogram and ultrasound of the bilateral breasts on 12/17/2020 showed a spiculated mass measuring at least 7.8 cm at 12-1:00 left breast extending from the nipple to 9 cm from the nipple with associated nipple retraction. This mass was biopsied, and showed IDC with surrounding DCIS, grade 3, ER+ 90%, and CA+ 75%.  HER2 was equivocal in approximately 35% of tumor cells by FISH and was negative by IHC.    Metastatic Breast Cancer Treatment:  12/23/2021 - 1/7/2021  Radiation (3000 cGy) to the lumbar spine.  1/29/2021 - present  Ibrance, zoladex, and anastrozole.  5/17/2021 radiofrequency ablation, kyphoplasty to L4  8/20/2021  Bilateral salpingo-oophorectomies, Ibrance and anastrozole.  She was off anastrozole 12/2021 - 2/2022 and off Ibrance 01/2022 - 02/2022 due to stress and impending homelessness. Off both again 03/2022-04/2022 due to death of her son but restarted in 05/2022.  04/2023  PET/CT with progression  in 2 small metastases in the left pelvis.  Palbociclib continued.  Anastrozole changed to exemestane  5/5/2023  Completed radiation, 2000 cGy in 5 fractions, to the left ilium.  12/19/2023 Left breast biopsy  [facility] NGS:   ER positive, 3+  100%   GA positive, 1+, 5%   HER2 negative.   AR positive, 1+, 35%   MMR proficient   PD-L1 negative, CPS 0   PTEN positive, 1+ 100%  *Of note, all of the above was IHC, DNA quantity not sufficient for NGS  1/18/24 - present  Fulvestrant + abemaciclib.  Abemaciclib dose reduced to 100 mg PO BID due to hand foot syndrome.  7/18/24 - completed XRT to the L1-S1, Left acetabulum 3000 cGy    Interval History:   Ms. Sanderson comes into clinic for metastatic breast cancer follow up.  She continues on treatment with Verzenio and fulvestrant.  Teresa is tolerating treatment well.      She is doing PT for her low back pain and left hip pain.  She completed radiation to the low back and left hip mid-July.  Had a little loose stools with the radiation.  Her left hip is now a 4/10 (it was a 8/10) however her low back remains at a 5/10 unchanged from radiation.  Uses 2 oxycodone, QID.  She works with Dr OLIVARES and still takes gabapentin and muscle relaxer as well.     Appetite has been less, but she is eating and weight is stable. She thinks this maybe is going on the last few weeks after radiation, but she is eating.  No nausea/vomiting.     Also, of note, she was in the ED in July- she went in w concerns of a stroke.  CT showed PE and now she is taking Xarelto routinely.  She was taking it intermittently.     Sleeping fine.  Takes seroquel at bedtime.      Birthday is this week, sister and aunt coming from [place] to celebrate with her.     No f/s/c. She has no current cough, shortness of breath, or chest pain.  She denies headaches or focal neurologic complaints.  She states much of her financial stress is improved as her CADI waiver went through and she now has help with transportation to  appointments.  She would like to talk to Jo Jacome again regarding the food shelf.     Past Medical/Surgical History:   Past Medical History:   Diagnosis Date     Anxiety      Breast CA (H) 12/2020     Depression      DVT (deep venous thrombosis) (H) 2014     Left breast mass     x approximately 4-5 months     Pulmonary emboli (H)      Pyelonephritis      Schizoaffective disorder (H)      Tobacco use      Past Surgical History:   Procedure Laterality Date     COLONOSCOPY N/A 7/8/2022    Procedure: COLONOSCOPY, WITH POLYPECTOMY;  Surgeon: Ham Cano MD;  Location: [facility] OR     IR LUMBAR KYPHOPLASTY VERTEBRAE  5/17/2021     LAPAROSCOPIC SALPINGO-OOPHORECTOMY Bilateral 8/20/2021    Procedure: BILATERAL SALPINGO-OOPHORECTOMY, LAPAROSCOPIC;  Surgeon: Rory Lopez MD;  Location: [facility] OR     TUBAL LIGATION  1998        Allergies   Allergen Reactions     Contrast Dye      Pt developed nausea after isovue 370 injection on 6/9/21        Current Outpatient Medications   Medication Sig Dispense Refill     abemaciclib (VERZENIO) 100 MG tablet Take 1 tablet (100 mg) by mouth 2 times daily for 28 days 56 tablet 0     abemaciclib (VERZENIO) 100 MG tablet Take 1 tablet (100 mg) by mouth 2 times daily for 28 days 56 tablet 0     gabapentin (NEURONTIN) 300 MG capsule Take 2 capsules in the morning, 2 capsules in afternoon, and 3 capsules at bedtime. 210 capsule 1     methocarbamol (ROBAXIN) 500 MG tablet Take 2-3 tablets (1,000-1,500 mg) by mouth 3 times daily as needed for muscle spasms 210 tablet 2     oxyCODONE (ROXICODONE) 5 MG tablet Take 1-2 tablets (5-10 mg) by mouth every 4 hours as needed for pain 90 tablet 0     QUEtiapine (SEROQUEL) 400 MG tablet Take 1 tablet (400 mg) by mouth at bedtime 30 tablet 2     QUEtiapine (SEROQUEL) 50 MG tablet Take 0.5-1 tablets (25-50 mg) by mouth 2 times daily as needed (for sleep or acute agitation) 60 tablet 2     rivaroxaban ANTICOAGULANT (XARELTO) 20 MG TABS tablet Take  1 tablet (20 mg) by mouth daily (with dinner) 30 tablet 0     rivaroxaban ANTICOAGULANT (XARELTO) 20 MG TABS tablet Take 1 tablet (20 mg) by mouth daily (with dinner) 90 tablet 3     sertraline (ZOLOFT) 100 MG tablet Take 1 tablet (100 mg) by mouth daily 30 tablet 2     Vitamin D3 (CHOLECALCIFEROL) 25 mcg (1000 units) tablet Take 1 tablet (25 mcg) by mouth daily 90 tablet 3     exemestane (AROMASIN) 25 MG tablet Take 1 tablet (25 mg) by mouth daily (Patient not taking: Reported on 5/9/2024) 90 tablet 3     naloxone (NARCAN) 4 MG/0.1ML nasal spray Spray 1 spray (4 mg) into one nostril alternating nostrils once as needed for opioid reversal every 2-3 minutes until assistance arrives (Patient not taking: Reported on 4/8/2024) 0.2 mL 0     No current facility-administered medications for this visit.   '  Physical Exam:   BP (!) 147/93   Pulse 68   Temp 98.3  F (36.8  C) (Oral)   Resp 18   Wt 106.8 kg (235 lb 6.4 oz)   SpO2 98%   BMI 32.83 kg/m    General:  Well appearing adult female in NAD.  Alert and oriented x 3.  HEENT:  Normocephalic.  Sclera anicteric. MMM.    Lymph:  No palpable cervical, supraclavicular, or axillary LAD.   Chest:  CTA bilaterally.  No wheezes or crackles.  CV:  RRR.  No audible m/r/g.  Abd:  Soft/ND/NT.  No palpable hepatosplenomegaly.  Ext:  No edema of the bilateral lower extremities.    Neuro:  Cranial nerves grossly intact. Gait stable.  No tremor or dyskinetic movements.  Psych:  Mood and affect appear normal.    Skin: She has some erythema, xerosis, thickened skin, and calluses on the soles of her feet and to a lesser degree on her hands     Laboratory/Imaging Studies:   I personally reviewed the below laboratories and images while in clinic today:    Most Recent 3 CBC's:  Recent Labs   Lab Test 08/05/24  1348 07/09/24  1419 07/01/24  1330   WBC 3.0* 4.5 4.4   HGB 10.1* 10.6* 10.1*   * 105* 109*    165 167   ANEUTAUTO 2.0 3.3 3.1     Most Recent 3 BMP's:  Recent Labs    Lab Test 08/05/24  1348 07/09/24  1419 07/01/24  1330 07/01/24  1240 07/01/24  1207    138 140  --  141   POTASSIUM 3.6 4.0 4.1  --  4.0   CHLORIDE 107 103 104  --  104   CO2 28 24 26  --  25   BUN 8.4 8.4 12.2  --  12.4   CR 1.05* 1.08* 1.34*   < > 1.36*   ANIONGAP 8 11 10  --  12   NANDO 9.4 9.6 9.6  --  9.6   * 147* 162*   < > 143*   PROTTOTAL 7.0 7.4  --   --  7.1   ALBUMIN 3.9 4.0  --   --  4.0    < > = values in this interval not displayed.    Most Recent 3 LFT's:  Recent Labs   Lab Test 08/05/24  1348 07/09/24  1419 07/01/24  1207   AST 16 13 20   ALT 12 9 15   ALKPHOS 75 90 92   BILITOTAL 0.2 0.3 0.3    Most Recent 2 TSH and T4:  Recent Labs   Lab Test 03/16/23  1650 06/14/22  1235 11/27/21  0752 09/13/21  1242   TSH 0.80 2.05   < > 0.18*   T4  --   --   --  1.14    < > = values in this interval not displayed.      I reviewed the above labs today.     6/6/2024 PET/CT:  FINDINGS:   BACKGROUND: Liver SUV max = 3.6, Aorta Blood SUV max = 3.4.      HEAD/NECK:  No abnormal uptake.      Likely degenerative uptake at the left C3-4 articular facet joint.  Stable non-FDG avid low-density lesion in the right thyroid measuring  1.7 cm. Redemonstration of patchy FDG uptake throughout the left  thyroid with a maximum SUV of 5.8.     CHEST:  Spiculated mass in the upper inner quadrant of the left breast  measuring 1.6 x 1.1 cm with an SUV max of 4.5, previously 1.7 x 1.1 cm  with an SUV max of 4.1 (series 603 image 230). Solid mass in the  medial left breast measuring 0.7 x 0.7 cm with an SUV max of 2.5,  previously 0.8 x 0.6 cm with an SUV max of 6 (series 603 image 253).  No new breast masses.     No FDG avid or enlarged thoracic or axillary lymph nodes.     ABDOMEN AND PELVIS:  No abnormal uptake.      Benign right renal cyst requiring no follow-up.     LOWER EXTREMITIES:   No abnormal uptake.      BONES AND SOFT TISSUES:   FDG avid lytic lesion in the posterior left iliac bone measuring 1 cm  with an SUV  max of 8.7, previously 0.7 cm with an SUV max of 5.5  (series 603 image 470).   FDG avid focus with faint associated sclerosis in the left acetabulum  with an SUV max of 13.5, previously 11.2 (series 603 image 53).      Slightly decreased uptake in the T7 vertebral body (SUV max 4.5,  previously 5.1).   Stable non-FDG avid sclerosis of the left iliac bone and L4 vertebral  bodies consistent with prior radiation.   Stable L4 vertebroplasty with unchanged cement extravasation into the  IVC.     Subcutaneous stranding lateral to the hips with mild FDG uptake (SUV  max 3.8 on the left and 3.0 on the right), suspect injection related  versus contusion. Low suspicion for metastasis.                                                                      IMPRESSION:      1. Stable to slightly decreased metabolic activity in the left breast  masses compared to 12/11/2023. No significant change in size.     2. Increased metabolic activity in the left iliac and acetabular bone  metastases concerning for osseous disease progression. Slightly  decreased metabolic activity in the T7 vertebral body lesion.     3. Patchy FDG uptake in the thyroid consistent with Hashimoto  thyroiditis.     4. Stable L4 vertebroplasty with cement extravasation into the IVC.    ASSESSMENT/PLAN:   48 year old female with history of DVT and ER positive left breast cancer metastasized to bones.    1.  Metastatic breast cancer: She has been on her most recent treatment with abemaciclib and fulvestrant since 01/2024.      - continue monthly faslodex and daily Abemaciclib dose reduced to 100 mg PO BID secondary to hand foot syndrome.  This is not a common side effect of abemaciclib but there is a reports of a rare cases.  Symptoms improved with the dose reduction.  - We reviewed the images of the PET/CT performed on 6/6 which shows an increase in  hypermetabolic uptake in the left ilium and the left acetabulum.  Discussed these sites of progression are  "small, nevertheless she is symptomatic of progression at the left ilium site.  Dr Plunkett recommended consideration of radiation treatment to the left ilium lesion (adjacent to the SI joint) and left acetabulum.  She completed XRT and has had some improvement in her LEFT acetabular pain however her low back remains stable.  She is unsure if this low back pain is from disease and/or chronic as she has had low back pain her entire adult life.   We discussed a repeat PET this fall to monitor response to radiation.    - Recommend tissue biopsy vs Guardant 360 liquid NGS at time of progression to help guide next treatment (capivasertib vs everolimus vs elacestrant)    2.  Bone metastases/low back pain with LLE sciatica:  She is s/p XRT to the left ilium as well as the lumbar spine and kyphoplasty of L4--with some extravasation of cement which procedularist is aware of and recommended continued AC indefinitely.   - she currently is on oxycodone, Robaxin, and gabapentin.  Ongoing follow up with palliative medicine.  - tylenol prn  - Receiving Zometa once every 3 months. Next is 9/4/24  - completed palliative radiation to left ilium/left SI joint as above.    3.  Schizoaffective disorder, bipolar type: No change with last visit.  She is on treatment with Seroquel and Zoloft. Ongoing follow up with psychiatry.     4.  HEME  -ho DVT:  Was on Xarelto.  Plan to continue until contraindicated given metastatic disease.  Was taking this \"sporadically\"   -ED visit on 7/1 with a stroke work up.  PE was found, she now understands she needs to stay on Xarelto indefinitely     5. Diarrhea: G1. Secondary to abemaciclib.  Imodium prn for 3 loose stools in over 24 hours. She feels her diarrhea is well managed on this regimen.    6.  HFS: Continue thick emollients.     7.  FELTON:  Secondary to abemaciclib.  Continue to avoid NSAIDs and encouraged good hydration.     Millicent Maza PA-C    45 minutes spent on the date of the encounter doing " chart review, review of test results, interpretation of tests, patient visit, and documentation  g                      Again, thank you for allowing me to participate in the care of your patient.        Sincerely,        Rita Maza PA-C

## 2024-08-05 NOTE — NURSING NOTE
"Oncology Rooming Note    August 5, 2024 1:55 PM   Teresa Sanderson is a 48 year old female who presents for:    Chief Complaint   Patient presents with    Blood Draw     Labs obtained via venipuncture 23 gauge butterfly needle, VS taken, checked into next appt    Oncology Clinic Visit    Breast Cancer     Malignant neoplasm of overlapping sites of left breast in female, estrogen receptor positive      Initial Vitals: BP (!) 147/93   Pulse 68   Temp 98.3  F (36.8  C) (Oral)   Resp 18   Wt 106.8 kg (235 lb 6.4 oz)   SpO2 98%   BMI 32.83 kg/m   Estimated body mass index is 32.83 kg/m  as calculated from the following:    Height as of 7/19/24: 1.803 m (5' 11\").    Weight as of this encounter: 106.8 kg (235 lb 6.4 oz). Body surface area is 2.31 meters squared.  Moderate Pain (5) Comment: Data Unavailable   No LMP recorded. (Menstrual status: Tubal ).  Allergies reviewed: Yes  Medications reviewed: Yes    Medications: Medication refills not needed today.  Pharmacy name entered into [facility]:    [facility][id number] - [place], [place] - [address] AT [facility] [address]  [facility] - [place], [place] - [address] [facility]  [id number] - [place], [place] - [address] [facility]  [id number] - [place], [place] - [address] [place] [address] & [facility] - [place], [place] - [address]  [facility] #[id number] - [place], [place] - [address] AT [facility] OF [address]    Frailty Screening:   Is the patient here for a new oncology consult visit in cancer care? 2. No      Clinical concerns: Pt reports no new concerns.       Rut Wynn, EMT   "

## 2024-08-05 NOTE — PROGRESS NOTES
Oncology Visit:   Date on this visit: Aug 5, 2024    Diagnosis:  ER positive left breast cancer metastasized to bones.     Primary Physician: No Ref-Primary, Physician     History Of Present Illness:    Ms. Sanderson is a 48 year old female with a h/o tobacco abuse and DVTs with left breast cancer metastasized to bone. She presented to [facility] ED with back pain on 12/5/2020. MRI of the L-spine showed an abnormal L4 lesion with associated right paraspinal mass, abnormalities in L5 and the left iliac bone were also seen. CT C/A/P showed a left breast mass, lytic lesions of T7, L4, and the pelvis, and a 3 cm lesion in the kidney (thought to be a cyst). Ultrasound of the bilateral lower extremities showed a non-occlusive thrombus in the left popliteal vein. Mammogram and ultrasound of the bilateral breasts on 12/17/2020 showed a spiculated mass measuring at least 7.8 cm at 12-1:00 left breast extending from the nipple to 9 cm from the nipple with associated nipple retraction. This mass was biopsied, and showed IDC with surrounding DCIS, grade 3, ER+ 90%, and WA+ 75%.  HER2 was equivocal in approximately 35% of tumor cells by FISH and was negative by IHC.    Metastatic Breast Cancer Treatment:  12/23/2021 - 1/7/2021  Radiation (3000 cGy) to the lumbar spine.  1/29/2021 - present  Ibrance, zoladex, and anastrozole.  5/17/2021 radiofrequency ablation, kyphoplasty to L4  8/20/2021  Bilateral salpingo-oophorectomies, Ibrance and anastrozole.  She was off anastrozole 12/2021 - 2/2022 and off Ibrance 01/2022 - 02/2022 due to stress and impending homelessness. Off both again 03/2022-04/2022 due to death of her son but restarted in 05/2022.  04/2023  PET/CT with progression in 2 small metastases in the left pelvis.  Palbociclib continued.  Anastrozole changed to exemestane  5/5/2023  Completed radiation, 2000 cGy in 5 fractions, to the left ilium.  12/19/2023 Left breast biopsy  [facility] NGS:   ER positive, 3+  100%   WA positive,  1+, 5%   HER2 negative.   AR positive, 1+, 35%   MMR proficient   PD-L1 negative, CPS 0   PTEN positive, 1+ 100%  *Of note, all of the above was IHC, DNA quantity not sufficient for NGS  1/18/24 - present  Fulvestrant + abemaciclib.  Abemaciclib dose reduced to 100 mg PO BID due to hand foot syndrome.  7/18/24 - completed XRT to the L1-S1, Left acetabulum 3000 cGy    Interval History:   Ms. Sanderson comes into clinic for metastatic breast cancer follow up.  She continues on treatment with Verzenio and fulvestrant.  Teresa is tolerating treatment well.      She is doing PT for her low back pain and left hip pain.  She completed radiation to the low back and left hip mid-July.  Had a little loose stools with the radiation.  Her left hip is now a 4/10 (it was a 8/10) however her low back remains at a 5/10 unchanged from radiation.  Uses 2 oxycodone, QID.  She works with Dr OLIVARES and still takes gabapentin and muscle relaxer as well.     Appetite has been less, but she is eating and weight is stable. She thinks this maybe is going on the last few weeks after radiation, but she is eating.  No nausea/vomiting.     Also, of note, she was in the ED in July- she went in w concerns of a stroke.  CT showed PE and now she is taking Xarelto routinely.  She was taking it intermittently.     Sleeping fine.  Takes seroquel at bedtime.      Birthday is this week, sister and aunt coming from [place] to celebrate with her.     No f/s/c. She has no current cough, shortness of breath, or chest pain.  She denies headaches or focal neurologic complaints.  She states much of her financial stress is improved as her CADI waiver went through and she now has help with transportation to appointments.  She would like to talk to Jo Jacome again regarding the food shelf.     Past Medical/Surgical History:   Past Medical History:   Diagnosis Date    Anxiety     Breast CA (H) 12/2020    Depression     DVT (deep venous thrombosis) (H) 2014    Left  breast mass     x approximately 4-5 months    Pulmonary emboli (H)     Pyelonephritis     Schizoaffective disorder (H)     Tobacco use      Past Surgical History:   Procedure Laterality Date    COLONOSCOPY N/A 7/8/2022    Procedure: COLONOSCOPY, WITH POLYPECTOMY;  Surgeon: Ham Cano MD;  Location: [facility] OR    IR LUMBAR KYPHOPLASTY VERTEBRAE  5/17/2021    LAPAROSCOPIC SALPINGO-OOPHORECTOMY Bilateral 8/20/2021    Procedure: BILATERAL SALPINGO-OOPHORECTOMY, LAPAROSCOPIC;  Surgeon: Rory Lopez MD;  Location: [facility] OR    TUBAL LIGATION  1998        Allergies   Allergen Reactions    Contrast Dye      Pt developed nausea after isovue 370 injection on 6/9/21        Current Outpatient Medications   Medication Sig Dispense Refill    abemaciclib (VERZENIO) 100 MG tablet Take 1 tablet (100 mg) by mouth 2 times daily for 28 days 56 tablet 0    abemaciclib (VERZENIO) 100 MG tablet Take 1 tablet (100 mg) by mouth 2 times daily for 28 days 56 tablet 0    gabapentin (NEURONTIN) 300 MG capsule Take 2 capsules in the morning, 2 capsules in afternoon, and 3 capsules at bedtime. 210 capsule 1    methocarbamol (ROBAXIN) 500 MG tablet Take 2-3 tablets (1,000-1,500 mg) by mouth 3 times daily as needed for muscle spasms 210 tablet 2    oxyCODONE (ROXICODONE) 5 MG tablet Take 1-2 tablets (5-10 mg) by mouth every 4 hours as needed for pain 90 tablet 0    QUEtiapine (SEROQUEL) 400 MG tablet Take 1 tablet (400 mg) by mouth at bedtime 30 tablet 2    QUEtiapine (SEROQUEL) 50 MG tablet Take 0.5-1 tablets (25-50 mg) by mouth 2 times daily as needed (for sleep or acute agitation) 60 tablet 2    rivaroxaban ANTICOAGULANT (XARELTO) 20 MG TABS tablet Take 1 tablet (20 mg) by mouth daily (with dinner) 30 tablet 0    rivaroxaban ANTICOAGULANT (XARELTO) 20 MG TABS tablet Take 1 tablet (20 mg) by mouth daily (with dinner) 90 tablet 3    sertraline (ZOLOFT) 100 MG tablet Take 1 tablet (100 mg) by mouth daily 30 tablet 2    Vitamin D3  (CHOLECALCIFEROL) 25 mcg (1000 units) tablet Take 1 tablet (25 mcg) by mouth daily 90 tablet 3    exemestane (AROMASIN) 25 MG tablet Take 1 tablet (25 mg) by mouth daily (Patient not taking: Reported on 5/9/2024) 90 tablet 3    naloxone (NARCAN) 4 MG/0.1ML nasal spray Spray 1 spray (4 mg) into one nostril alternating nostrils once as needed for opioid reversal every 2-3 minutes until assistance arrives (Patient not taking: Reported on 4/8/2024) 0.2 mL 0     No current facility-administered medications for this visit.   '  Physical Exam:   BP (!) 147/93   Pulse 68   Temp 98.3  F (36.8  C) (Oral)   Resp 18   Wt 106.8 kg (235 lb 6.4 oz)   SpO2 98%   BMI 32.83 kg/m    General:  Well appearing adult female in NAD.  Alert and oriented x 3.  HEENT:  Normocephalic.  Sclera anicteric. MMM.    Lymph:  No palpable cervical, supraclavicular, or axillary LAD.   Chest:  CTA bilaterally.  No wheezes or crackles.  CV:  RRR.  No audible m/r/g.  Abd:  Soft/ND/NT.  No palpable hepatosplenomegaly.  Ext:  No edema of the bilateral lower extremities.    Neuro:  Cranial nerves grossly intact. Gait stable.  No tremor or dyskinetic movements.  Psych:  Mood and affect appear normal.    Skin: She has some erythema, xerosis, thickened skin, and calluses on the soles of her feet and to a lesser degree on her hands     Laboratory/Imaging Studies:   I personally reviewed the below laboratories and images while in clinic today:    Most Recent 3 CBC's:  Recent Labs   Lab Test 08/05/24  1348 07/09/24  1419 07/01/24  1330   WBC 3.0* 4.5 4.4   HGB 10.1* 10.6* 10.1*   * 105* 109*    165 167   ANEUTAUTO 2.0 3.3 3.1     Most Recent 3 BMP's:  Recent Labs   Lab Test 08/05/24  1348 07/09/24  1419 07/01/24  1330 07/01/24  1240 07/01/24  1207    138 140  --  141   POTASSIUM 3.6 4.0 4.1  --  4.0   CHLORIDE 107 103 104  --  104   CO2 28 24 26  --  25   BUN 8.4 8.4 12.2  --  12.4   CR 1.05* 1.08* 1.34*   < > 1.36*   ANIONGAP 8 11 10  --   12   NANDO 9.4 9.6 9.6  --  9.6   * 147* 162*   < > 143*   PROTTOTAL 7.0 7.4  --   --  7.1   ALBUMIN 3.9 4.0  --   --  4.0    < > = values in this interval not displayed.    Most Recent 3 LFT's:  Recent Labs   Lab Test 08/05/24  1348 07/09/24  1419 07/01/24  1207   AST 16 13 20   ALT 12 9 15   ALKPHOS 75 90 92   BILITOTAL 0.2 0.3 0.3    Most Recent 2 TSH and T4:  Recent Labs   Lab Test 03/16/23  1650 06/14/22  1235 11/27/21  0752 09/13/21  1242   TSH 0.80 2.05   < > 0.18*   T4  --   --   --  1.14    < > = values in this interval not displayed.      I reviewed the above labs today.     6/6/2024 PET/CT:  FINDINGS:   BACKGROUND: Liver SUV max = 3.6, Aorta Blood SUV max = 3.4.      HEAD/NECK:  No abnormal uptake.      Likely degenerative uptake at the left C3-4 articular facet joint.  Stable non-FDG avid low-density lesion in the right thyroid measuring  1.7 cm. Redemonstration of patchy FDG uptake throughout the left  thyroid with a maximum SUV of 5.8.     CHEST:  Spiculated mass in the upper inner quadrant of the left breast  measuring 1.6 x 1.1 cm with an SUV max of 4.5, previously 1.7 x 1.1 cm  with an SUV max of 4.1 (series 603 image 230). Solid mass in the  medial left breast measuring 0.7 x 0.7 cm with an SUV max of 2.5,  previously 0.8 x 0.6 cm with an SUV max of 6 (series 603 image 253).  No new breast masses.     No FDG avid or enlarged thoracic or axillary lymph nodes.     ABDOMEN AND PELVIS:  No abnormal uptake.      Benign right renal cyst requiring no follow-up.     LOWER EXTREMITIES:   No abnormal uptake.      BONES AND SOFT TISSUES:   FDG avid lytic lesion in the posterior left iliac bone measuring 1 cm  with an SUV max of 8.7, previously 0.7 cm with an SUV max of 5.5  (series 603 image 470).   FDG avid focus with faint associated sclerosis in the left acetabulum  with an SUV max of 13.5, previously 11.2 (series 603 image 53).      Slightly decreased uptake in the T7 vertebral body (SUV max  4.5,  previously 5.1).   Stable non-FDG avid sclerosis of the left iliac bone and L4 vertebral  bodies consistent with prior radiation.   Stable L4 vertebroplasty with unchanged cement extravasation into the  IVC.     Subcutaneous stranding lateral to the hips with mild FDG uptake (SUV  max 3.8 on the left and 3.0 on the right), suspect injection related  versus contusion. Low suspicion for metastasis.                                                                      IMPRESSION:      1. Stable to slightly decreased metabolic activity in the left breast  masses compared to 12/11/2023. No significant change in size.     2. Increased metabolic activity in the left iliac and acetabular bone  metastases concerning for osseous disease progression. Slightly  decreased metabolic activity in the T7 vertebral body lesion.     3. Patchy FDG uptake in the thyroid consistent with Hashimoto  thyroiditis.     4. Stable L4 vertebroplasty with cement extravasation into the IVC.    ASSESSMENT/PLAN:   48 year old female with history of DVT and ER positive left breast cancer metastasized to bones.    1.  Metastatic breast cancer: She has been on her most recent treatment with abemaciclib and fulvestrant since 01/2024.      - continue monthly faslodex and daily Abemaciclib dose reduced to 100 mg PO BID secondary to hand foot syndrome.  This is not a common side effect of abemaciclib but there is a reports of a rare cases.  Symptoms improved with the dose reduction.  - We reviewed the images of the PET/CT performed on 6/6 which shows an increase in  hypermetabolic uptake in the left ilium and the left acetabulum.  Discussed these sites of progression are small, nevertheless she is symptomatic of progression at the left ilium site.  Dr Plunkett recommended consideration of radiation treatment to the left ilium lesion (adjacent to the SI joint) and left acetabulum.  She completed XRT and has had some improvement in her LEFT acetabular  "pain however her low back remains stable.  She is unsure if this low back pain is from disease and/or chronic as she has had low back pain her entire adult life.   We discussed a repeat PET this fall to monitor response to radiation.    - Recommend tissue biopsy vs Guardant 360 liquid NGS at time of progression to help guide next treatment (capivasertib vs everolimus vs elacestrant)    2.  Bone metastases/low back pain with LLE sciatica:  She is s/p XRT to the left ilium as well as the lumbar spine and kyphoplasty of L4--with some extravasation of cement which procedularist is aware of and recommended continued AC indefinitely.   - she currently is on oxycodone, Robaxin, and gabapentin.  Ongoing follow up with palliative medicine.  - tylenol prn  - Receiving Zometa once every 3 months. Next is 9/4/24  - completed palliative radiation to left ilium/left SI joint as above.    3.  Schizoaffective disorder, bipolar type: No change with last visit.  She is on treatment with Seroquel and Zoloft. Ongoing follow up with psychiatry.     4.  HEME  -ho DVT:  Was on Xarelto.  Plan to continue until contraindicated given metastatic disease.  Was taking this \"sporadically\"   -ED visit on 7/1 with a stroke work up.  PE was found, she now understands she needs to stay on Xarelto indefinitely     5. Diarrhea: G1. Secondary to abemaciclib.  Imodium prn for 3 loose stools in over 24 hours. She feels her diarrhea is well managed on this regimen.    6.  HFS: Continue thick emollients.     7.  FELTON:  Secondary to abemaciclib.  Continue to avoid NSAIDs and encouraged good hydration.     Millicent Maza PA-C    45 minutes spent on the date of the encounter doing chart review, review of test results, interpretation of tests, patient visit, and documentation  g                    "

## 2024-08-07 ENCOUNTER — PATIENT OUTREACH (OUTPATIENT)
Dept: CARE COORDINATION | Facility: CLINIC | Age: 49
End: 2024-08-07
Payer: MEDICARE

## 2024-08-07 NOTE — PROGRESS NOTES
Social Work - Follow-Up  [facility]    Data/Intervention:    Patient Name: Teresa Sanderson Goes By: Teresa    /Age: 1975 (48 year old)    Reason for Follow-Up:  [facility]    Collaborated With:    -patient  -    Intervention/Education/Resources Provided:  Patient discussed how they were doing. Stated they have been coping well since being on a CADI waiver and having assistance with transportation. Patient reports they have not missed any appointments. Patient discussed their upcoming birthday celebrations.   Patient has questions about how to use [facility] recourse, has not yet used it due to not being able to get to [facility].     Patient states they now have transportation to get to [facility].     SW provided education about how use the resource.     Patient requested schedule information, they could not locate previous information sent.     Assessment/Plan:  SW mailed welcome packet with schedule information.     Patient will follow back up as needed for ongoing supports.     Previously provided patient/family with writer's contact information and availability.      DANA Puckett, [facility] HospitalSW    [facility]  [address] [place] [address]  Office: [phone]  Fax: [phone]  Gender Pronouns: She/Her  Employed by [employer]  Support Groups at [facility]: Social Work Services for Cancer Patients ([email or web address])  Employed by [employer]

## 2024-08-15 ENCOUNTER — VIRTUAL VISIT (OUTPATIENT)
Dept: ONCOLOGY | Facility: CLINIC | Age: 49
End: 2024-08-15
Attending: STUDENT IN AN ORGANIZED HEALTH CARE EDUCATION/TRAINING PROGRAM
Payer: MEDICARE

## 2024-08-15 VITALS — HEIGHT: 71 IN | BODY MASS INDEX: 32.2 KG/M2 | WEIGHT: 230 LBS

## 2024-08-15 DIAGNOSIS — M25.59 PAIN IN OTHER JOINT: ICD-10-CM

## 2024-08-15 DIAGNOSIS — Z51.5 ENCOUNTER FOR PALLIATIVE CARE: ICD-10-CM

## 2024-08-15 DIAGNOSIS — G89.3 CANCER ASSOCIATED PAIN: ICD-10-CM

## 2024-08-15 DIAGNOSIS — C50.812 MALIGNANT NEOPLASM OF OVERLAPPING SITES OF LEFT BREAST IN FEMALE, ESTROGEN RECEPTOR POSITIVE (H): Primary | ICD-10-CM

## 2024-08-15 DIAGNOSIS — Z17.0 MALIGNANT NEOPLASM OF OVERLAPPING SITES OF LEFT BREAST IN FEMALE, ESTROGEN RECEPTOR POSITIVE (H): Primary | ICD-10-CM

## 2024-08-15 PROCEDURE — 99214 OFFICE O/P EST MOD 30 MIN: CPT | Mod: 95 | Performed by: STUDENT IN AN ORGANIZED HEALTH CARE EDUCATION/TRAINING PROGRAM

## 2024-08-15 RX ORDER — OXYCODONE HYDROCHLORIDE 5 MG/1
5-10 TABLET ORAL EVERY 4 HOURS PRN
Qty: 120 TABLET | Refills: 0 | Status: SHIPPED | OUTPATIENT
Start: 2024-08-15 | End: 2024-09-25

## 2024-08-15 ASSESSMENT — PAIN SCALES - GENERAL: PAINLEVEL: SEVERE PAIN (7)

## 2024-08-15 NOTE — NURSING NOTE
Current patient location: [address]   [facility] [address]    Is the patient currently in the state of [place]? YES    Visit mode:VIDEO    If the visit is dropped, the patient can be reconnected by: VIDEO VISIT: Text to cell phone:   Telephone Information:   Mobile [phone]       Will anyone else be joining the visit? NO  (If patient encounters technical issues they should call [phone]    How would you like to obtain your AVS? MyChart    Are changes needed to the allergy or medication list? No    Are refills needed on medications prescribed by this physician? YES    Rooming Documentation:  Questionnaire(s) not done per department protocol      Reason for visit: RECHSOCORRO CUELLAR

## 2024-08-15 NOTE — LETTER
8/15/2024      Teresa Sanderson  [address]  [facility] [address]      Dear Colleague,    Thank you for referring your patient, Teresa Sanderson, to the [facility] CANCER [facility]. Please see a copy of my visit note below.    Palliative Care Progress Note    Patient Name: Teresa Sanderson  Primary Provider: No Ref-Primary, Physician    Chief Complaint/Patient ID:   Medical - She has metastatic breast cancer dx 2020; widespread bone mets. S/p RT to lumbar spine and RFA/kyphoplasty L4 2021.   She has been on several different lines of therapy (with some treatment interruptions).  -PET/CT in 2023 showed PD in two small bone metastasis in L pelvis, S/p 5 fractions RT to these lesions.   -Some lapses/breaks in treatment due to complicated social situation.  -Evidence of PD on 2023 PET scan.  Changed to Verzenio 2024 and Fulvestrant. Again s/p RT to low back and L hip 2024.    -Long discussion about voluntary opioid tapering 11/15/22 palliative visit. Due to worsening pain from bony mets, retrial of opioids Rx 2024.     -She has schizoaffective disorder (bipolar type); followed by psychiatry at the .    Last Palliative care appointment: 2024 with me. Resumed oxycodone for cancer-related pain.     Reviewed: Yes. Last refill of #90 tabs oxycodone 5mg on .    Social History: Lives with cousin and son. On SSD. 5 adult children. Housing insecure. Daughter murdered in . Son  Spring 2022. Worked as a  for [employer] before cancer dx, in [place].   No LOYD hx    Interim History:  Teresa Sanderson is a 48 year old female who is seen today for follow up with Palliative Care via billable video visit.     Since last visit, had ED visit in July for stroke symptoms, found to have PE as had only been taking Xarelto intermittently.    Reviewed Oncology note from . Tolerating current treatment well. Planning to continue and repeat PET scan in the Fall.  "Will consider tissue biopsy at time of next disease progression.    Back pain did not change much after RT but hip pain did improve from 8/10 to 4/10. Taking 10mg oxycodone 4x daily.    She notes severe worsening of pain over the last couple weeks.  Says it flared up around her birthday weekend, which was about 3 days after her last fulvestrant injection.  States that she has had severe aching in the backs of her thighs, knees, her back, and into her elbows.  She describes it as \"really severe joint pain\".  States it felt like it slowly built up.  Has continued taking the oxycodone at 10 AM, 2 PM, 6 PM, and 10 PM.  Finds the 10 mg works better.  Had also been trying Tylenol and ibuprofen.  Really frustrated that she had to cancel plans around her birthday weekend due to how bad the pain was.    Physical Exam:   Constitutional: Alert, pleasant, no apparent distress. Lying back in bed.  Eyes: Sclera non-icteric, no eye discharge.  ENT: No nasal discharge. Ears grossly normal.  Respiratory: Unlabored respirations. Speaking in full sentences.  Musculoskeletal: Extremities appear normal- no gross deformities noted. No edema noted on upper body.   Skin: No suspicious lesions or rashes on visible skin.  Neurologic: Clear speech, no aphasia. No facial droop.  Psychiatric: Mentation appears normal, appropriate attention. Affect normal/bright. Does not appear anxious or depressed.    Key Data Reviewed:  LABS:   Lab Results   Component Value Date    WBC 3.0 (L) 08/05/2024    HGB 10.1 (L) 08/05/2024    HCT 30.9 (L) 08/05/2024     08/05/2024     08/05/2024    POTASSIUM 3.6 08/05/2024    CHLORIDE 107 08/05/2024    CO2 28 08/05/2024    BUN 8.4 08/05/2024    CR 1.05 (H) 08/05/2024     (H) 08/05/2024    DD 1.0 (H) 12/09/2020    TROPONIN <0.015 08/10/2021    TROPI <0.015 12/08/2020    AST 16 08/05/2024    ALT 12 08/05/2024    ALKPHOS 75 08/05/2024    BILITOTAL 0.2 08/05/2024    INR 1.11 07/01/2024 8/5/24- GFR " 65, Albumin 3.9. CA 15-3 of 55 (slightly decreased).    IMAGING: CTA head and neck 7/1/2024- IMPRESSION:   1. Multifocal segmental and subsegmental pulmonary emboli throughout the right hemithorax, with the majority of the clot burden likely acute. No significant right-sided heart strain.  2. Head CTA demonstrates no aneurysm or stenosis of the major  intracranial arteries.   3. Neck CTA demonstrates no stenosis of the major cervical arteries.  [Urgent Result: Pulmonary embolism]    Impression & Recommendations & Counseling:  Teresa Sanderson is a 48 year old female with history of metastatic breast cancer.     Pain - Ongoing struggle-different types of pain concurrently.  No benefit from second trial of Butrans patch, so recommend stopping this.  Given the new bony mets,  retrialed short acting oxycodone again (she previously did not seem to have much benefit from her chronic pain with opioids, and she had difficulty with consistency.)  She did find this to be helpful, though now it sounds like she is having some increased arthralgias, possibly related to her cancer directed therapies.  Most recent pain flares 5 to 3 days after fulvestrant.  -Refill sent for oxycodone 5-10mg Q4H PRN.  Discussed taking the oxycodone prior to an activity that she knows will cause pain.  -Encouraged to use heat and trial topical agents such as Voltaren.  -Encouraged to continue with physical therapy appointments and exercises.  -Continue Robaxin and gabapentin.  -Message sent to oncology to update on changes in pain.  -Overloaded with appointments right now due to radiation, however once she is through radiation, I would recommend a referral to Dr. Bell in PM&R.  I do wonder if there is a component of her back pain that might respond to injections.      Follow up: 2 months      Video-Visit Details  Video Start Time: 9:22 AM  Video End Time: 9:40 AM    Originating Location (pt. Location): Home     Distant Location (provider location):   Offsite- Personal Home      Platform used for Video Visit: Chad     Total time spent on day of encounter is 30 mins, including reviewing record, review of above studies, above visit with patient, symptomatic discussion of primarily different causes of pain and side effects of breast cancer treatment, including medication adjustments/prescription management, and documentation.         Ayanna Tellez DO  Palliative Medicine   [id number]    Some chart documentation performed using Dragon Voice recognition Software. Although reviewed after completion, some words and grammatical errors may remain.      Again, thank you for allowing me to participate in the care of your patient.        Sincerely,        Ayanna Tellez DO

## 2024-08-15 NOTE — LETTER
8/15/2024      Teresa Sanderson  [address]  [facility] [address]      Dear Colleague,    Thank you for referring your patient, Teresa Sanderson, to the [facility]. Please see a copy of my visit note below.    Palliative Care Progress Note    Patient Name: Teresa Sanderson  Primary Provider: No Ref-Primary, Physician    Chief Complaint/Patient ID:   Medical - She has metastatic breast cancer dx 2020; widespread bone mets. S/p RT to lumbar spine and RFA/kyphoplasty L4 2021.   She has been on several different lines of therapy (with some treatment interruptions).  -PET/CT in 2023 showed PD in two small bone metastasis in L pelvis, S/p 5 fractions RT to these lesions.   -Some lapses/breaks in treatment due to complicated social situation.  -Evidence of PD on 2023 PET scan.  Changed to Verzenio 2024 and Fulvestrant. Again s/p RT to low back and L hip 2024.    -Long discussion about voluntary opioid tapering 11/15/22 palliative visit. Due to worsening pain from bony mets, retrial of opioids Rx 2024.     -She has schizoaffective disorder (bipolar type); followed by psychiatry at the .    Last Palliative care appointment: 2024 with me. Resumed oxycodone for cancer-related pain.     Reviewed: Yes. Last refill of #90 tabs oxycodone 5mg on .    Social History: Lives with cousin and son. On SSD. 5 adult children. Housing insecure. Daughter murdered in . Son  Spring 2022. Worked as a  for [employer] before cancer dx, in [place].   No LOYD hx    Interim History:  Teresa Sanderson is a 48 year old female who is seen today for follow up with Palliative Care via billable video visit.     Since last visit, had ED visit in July for stroke symptoms, found to have PE as had only been taking Xarelto intermittently.    Reviewed Oncology note from . Tolerating current treatment well. Planning to continue and repeat PET scan in the Fall.  "Will consider tissue biopsy at time of next disease progression.    Back pain did not change much after RT but hip pain did improve from 8/10 to 4/10. Taking 10mg oxycodone 4x daily.    She notes severe worsening of pain over the last couple weeks.  Says it flared up around her birthday weekend, which was about 3 days after her last fulvestrant injection.  States that she has had severe aching in the backs of her thighs, knees, her back, and into her elbows.  She describes it as \"really severe joint pain\".  States it felt like it slowly built up.  Has continued taking the oxycodone at 10 AM, 2 PM, 6 PM, and 10 PM.  Finds the 10 mg works better.  Had also been trying Tylenol and ibuprofen.  Really frustrated that she had to cancel plans around her birthday weekend due to how bad the pain was.    Physical Exam:   Constitutional: Alert, pleasant, no apparent distress. Lying back in bed.  Eyes: Sclera non-icteric, no eye discharge.  ENT: No nasal discharge. Ears grossly normal.  Respiratory: Unlabored respirations. Speaking in full sentences.  Musculoskeletal: Extremities appear normal- no gross deformities noted. No edema noted on upper body.   Skin: No suspicious lesions or rashes on visible skin.  Neurologic: Clear speech, no aphasia. No facial droop.  Psychiatric: Mentation appears normal, appropriate attention. Affect normal/bright. Does not appear anxious or depressed.    Key Data Reviewed:  LABS:   Lab Results   Component Value Date    WBC 3.0 (L) 08/05/2024    HGB 10.1 (L) 08/05/2024    HCT 30.9 (L) 08/05/2024     08/05/2024     08/05/2024    POTASSIUM 3.6 08/05/2024    CHLORIDE 107 08/05/2024    CO2 28 08/05/2024    BUN 8.4 08/05/2024    CR 1.05 (H) 08/05/2024     (H) 08/05/2024    DD 1.0 (H) 12/09/2020    TROPONIN <0.015 08/10/2021    TROPI <0.015 12/08/2020    AST 16 08/05/2024    ALT 12 08/05/2024    ALKPHOS 75 08/05/2024    BILITOTAL 0.2 08/05/2024    INR 1.11 07/01/2024 8/5/24- GFR " 65, Albumin 3.9. CA 15-3 of 55 (slightly decreased).    IMAGING: CTA head and neck 7/1/2024- IMPRESSION:   1. Multifocal segmental and subsegmental pulmonary emboli throughout the right hemithorax, with the majority of the clot burden likely acute. No significant right-sided heart strain.  2. Head CTA demonstrates no aneurysm or stenosis of the major  intracranial arteries.   3. Neck CTA demonstrates no stenosis of the major cervical arteries.  [Urgent Result: Pulmonary embolism]    Impression & Recommendations & Counseling:  Teresa Sanderson is a 48 year old female with history of metastatic breast cancer.     Pain - Ongoing struggle-different types of pain concurrently.  No benefit from second trial of Butrans patch, so recommend stopping this.  Given the new bony mets,  retrialed short acting oxycodone again (she previously did not seem to have much benefit from her chronic pain with opioids, and she had difficulty with consistency.)  She did find this to be helpful, though now it sounds like she is having some increased arthralgias, possibly related to her cancer directed therapies.  Most recent pain flares 5 to 3 days after fulvestrant.  -Refill sent for oxycodone 5-10mg Q4H PRN.  Discussed taking the oxycodone prior to an activity that she knows will cause pain.  -Encouraged to use heat and trial topical agents such as Voltaren.  -Encouraged to continue with physical therapy appointments and exercises.  -Continue Robaxin and gabapentin.  -Message sent to oncology to update on changes in pain.  -Overloaded with appointments right now due to radiation, however once she is through radiation, I would recommend a referral to Dr. Bell in PM&R.  I do wonder if there is a component of her back pain that might respond to injections.      Follow up: 2 months      Video-Visit Details  Video Start Time: 9:22 AM  Video End Time: 9:40 AM    Originating Location (pt. Location): Home     Distant Location (provider location):   Offsite- Personal Home      Platform used for Video Visit: Chad     Total time spent on day of encounter is 30 mins, including reviewing record, review of above studies, above visit with patient, symptomatic discussion of primarily different causes of pain and side effects of breast cancer treatment, including medication adjustments/prescription management, and documentation.         Ayanna Tellez DO  Palliative Medicine   [id number]    Some chart documentation performed using Dragon Voice recognition Software. Although reviewed after completion, some words and grammatical errors may remain.      Again, thank you for allowing me to participate in the care of your patient.        Sincerely,        Ayanna Tellez DO

## 2024-08-15 NOTE — PROGRESS NOTES
Palliative Care Progress Note    Patient Name: Teresa Sanderson  Primary Provider: No Ref-Primary, Physician    Chief Complaint/Patient ID:   Medical - She has metastatic breast cancer dx 2020; widespread bone mets. S/p RT to lumbar spine and RFA/kyphoplasty L4 2021.   She has been on several different lines of therapy (with some treatment interruptions).  -PET/CT in 2023 showed PD in two small bone metastasis in L pelvis, S/p 5 fractions RT to these lesions.   -Some lapses/breaks in treatment due to complicated social situation.  -Evidence of PD on 2023 PET scan.  Changed to Verzenio 2024 and Fulvestrant. Again s/p RT to low back and L hip 2024.    -Long discussion about voluntary opioid tapering 11/15/22 palliative visit. Due to worsening pain from bony mets, retrial of opioids Rx 2024.     -She has schizoaffective disorder (bipolar type); followed by psychiatry at the .    Last Palliative care appointment: 2024 with me. Resumed oxycodone for cancer-related pain.     Reviewed: Yes. Last refill of #90 tabs oxycodone 5mg on .    Social History: Lives with cousin and son. On SSD. 5 adult children. Housing insecure. Daughter murdered in . Son  Spring 2022. Worked as a  for [employer] before cancer dx, in [place].   No LOYD hx    Interim History:  Teresa Sanderson is a 48 year old female who is seen today for follow up with Palliative Care via billable video visit.     Since last visit, had ED visit in July for stroke symptoms, found to have PE as had only been taking Xarelto intermittently.    Reviewed Oncology note from . Tolerating current treatment well. Planning to continue and repeat PET scan in the Fall. Will consider tissue biopsy at time of next disease progression.    Back pain did not change much after RT but hip pain did improve from 8/10 to 4/10. Taking 10mg oxycodone 4x daily.    She notes severe worsening of pain over the last couple weeks.  Says it  "flared up around her birthday weekend, which was about 3 days after her last fulvestrant injection.  States that she has had severe aching in the backs of her thighs, knees, her back, and into her elbows.  She describes it as \"really severe joint pain\".  States it felt like it slowly built up.  Has continued taking the oxycodone at 10 AM, 2 PM, 6 PM, and 10 PM.  Finds the 10 mg works better.  Had also been trying Tylenol and ibuprofen.  Really frustrated that she had to cancel plans around her birthday weekend due to how bad the pain was.    Physical Exam:   Constitutional: Alert, pleasant, no apparent distress. Lying back in bed.  Eyes: Sclera non-icteric, no eye discharge.  ENT: No nasal discharge. Ears grossly normal.  Respiratory: Unlabored respirations. Speaking in full sentences.  Musculoskeletal: Extremities appear normal- no gross deformities noted. No edema noted on upper body.   Skin: No suspicious lesions or rashes on visible skin.  Neurologic: Clear speech, no aphasia. No facial droop.  Psychiatric: Mentation appears normal, appropriate attention. Affect normal/bright. Does not appear anxious or depressed.    Key Data Reviewed:  LABS:   Lab Results   Component Value Date    WBC 3.0 (L) 08/05/2024    HGB 10.1 (L) 08/05/2024    HCT 30.9 (L) 08/05/2024     08/05/2024     08/05/2024    POTASSIUM 3.6 08/05/2024    CHLORIDE 107 08/05/2024    CO2 28 08/05/2024    BUN 8.4 08/05/2024    CR 1.05 (H) 08/05/2024     (H) 08/05/2024    DD 1.0 (H) 12/09/2020    TROPONIN <0.015 08/10/2021    TROPI <0.015 12/08/2020    AST 16 08/05/2024    ALT 12 08/05/2024    ALKPHOS 75 08/05/2024    BILITOTAL 0.2 08/05/2024    INR 1.11 07/01/2024 8/5/24- GFR 65, Albumin 3.9. CA 15-3 of 55 (slightly decreased).    IMAGING: CTA head and neck 7/1/2024- IMPRESSION:   1. Multifocal segmental and subsegmental pulmonary emboli throughout the right hemithorax, with the majority of the clot burden likely acute. No " significant right-sided heart strain.  2. Head CTA demonstrates no aneurysm or stenosis of the major  intracranial arteries.   3. Neck CTA demonstrates no stenosis of the major cervical arteries.  [Urgent Result: Pulmonary embolism]    Impression & Recommendations & Counseling:  Teresa Sanderson is a 48 year old female with history of metastatic breast cancer.     Pain - Ongoing struggle-different types of pain concurrently.  No benefit from second trial of Butrans patch, so recommend stopping this.  Given the new bony mets,  retrialed short acting oxycodone again (she previously did not seem to have much benefit from her chronic pain with opioids, and she had difficulty with consistency.)  She did find this to be helpful, though now it sounds like she is having some increased arthralgias, possibly related to her cancer directed therapies.  Most recent pain flares 5 to 3 days after fulvestrant.  -Refill sent for oxycodone 5-10mg Q4H PRN.  Discussed taking the oxycodone prior to an activity that she knows will cause pain.  -Encouraged to use heat and trial topical agents such as Voltaren.  -Encouraged to continue with physical therapy appointments and exercises.  -Continue Robaxin and gabapentin.  -Message sent to oncology to update on changes in pain.  -Overloaded with appointments right now due to radiation, however once she is through radiation, I would recommend a referral to Dr. Bell in PM&R.  I do wonder if there is a component of her back pain that might respond to injections.      Follow up: 2 months      Video-Visit Details  Video Start Time: 9:22 AM  Video End Time: 9:40 AM    Originating Location (pt. Location): Home     Distant Location (provider location):  Offsite- Personal Home      Platform used for Video Visit: Chad     Total time spent on day of encounter is 30 mins, including reviewing record, review of above studies, above visit with patient, symptomatic discussion of primarily different causes  of pain and side effects of breast cancer treatment, including medication adjustments/prescription management, and documentation.         Ayanna Tellez DO  Palliative Medicine   [id number]    Some chart documentation performed using Dragon Voice recognition Software. Although reviewed after completion, some words and grammatical errors may remain.

## 2024-08-16 ENCOUNTER — TELEPHONE (OUTPATIENT)
Dept: PHYSICAL THERAPY | Facility: CLINIC | Age: 49
End: 2024-08-16
Payer: MEDICARE

## 2024-08-17 NOTE — TELEPHONE ENCOUNTER
Pt did not show for therapy today, she was called and voicemail left offering appt later in the day, reminding of next visit and requesting she call to cancel with clinic phone number left.    Carolyn Espinoza, PT,SRAVANI

## 2024-08-22 ENCOUNTER — OFFICE VISIT (OUTPATIENT)
Dept: RADIATION ONCOLOGY | Facility: CLINIC | Age: 49
End: 2024-08-22
Attending: RADIOLOGY
Payer: MEDICARE

## 2024-08-22 VITALS
DIASTOLIC BLOOD PRESSURE: 87 MMHG | OXYGEN SATURATION: 99 % | RESPIRATION RATE: 16 BRPM | HEART RATE: 64 BPM | SYSTOLIC BLOOD PRESSURE: 132 MMHG

## 2024-08-22 DIAGNOSIS — C79.51 CARCINOMA OF LEFT BREAST METASTATIC TO BONE (H): Primary | ICD-10-CM

## 2024-08-22 DIAGNOSIS — C50.912 CARCINOMA OF LEFT BREAST METASTATIC TO BONE (H): Primary | ICD-10-CM

## 2024-08-22 ASSESSMENT — PAIN SCALES - GENERAL: PAINLEVEL: SEVERE PAIN (6)

## 2024-08-22 NOTE — LETTER
8/22/2024      Teresa Sanderson  [address]  [facility] [address]      Dear Colleague,    Thank you for referring your patient, Teresa Sanderson, to the [facility] RADIATION ONCOLOGY. Please see a copy of my visit note below.    RADIATION ONCOLOGY FOLLOW UP NOTE    DATE OF FOLLOW UP: 8/22/2024     PATIENT NAME: Teresa Sanderson    DIAGNOSIS: Bone metastases secondary to primary breast cancer         RADIATION HISTORY:  Radiation Oncology - Course: 1   Treatment Site Modality Dose (cGy) per Fraction Number of Fractions Total Dose (cGy) Dates of Treatment Elapsed Days   L3-L5 18 X 300 10 3000 12/23/2020 to 1/07/2021  15     Radiation Oncology - Course: 2   Treatment Site Modality Dose (cGy) per Fraction Number of Fractions Total Dose (cGy) Dates of Treatment Elapsed Days   Lt Iliac Bone 18 X 400 5 2000 5/01/2023 to 5/05/2023 5     Radiation Oncology - Course: 3   Treatment Site Modality Dose (cGy) per Fraction Number of Fractions Total Dose (cGy) Dates of Treatment Elapsed Days   Lt Acetabulum and SI re-tx  06 X 200 15 3000 6/27/2024 to 7/18/2024  21     SUBJECTIVE:    Ms. Sanderson returns today for 1 month follow-up.  She reports no pain in her left SI joint, but does complain of 6/10 low back pain which decreases to 4/10 with oxycodone.  Overall, she complains of generalized bodyaches with her low back pain being similar pain level to everything else.  She experienced loose stools during the last few days of her radiotherapy, but this resolved soon after finishing radiation.  She has not experienced any urinary difficulties.    OBJECTIVE: /87   Pulse 64   Resp 16   SpO2 99%   Gen: NAD  Lungs: CTAB  Heart: RRR  Neuro: CNII-XII grossly intact, gait normal    RECOMMENDATION:   Return to our clinic for followup as needed.  Followup with medical oncology as needed.    Mike Murillo M.D.  Department of Radiation Oncology  [facility]       Again, thank  you for allowing me to participate in the care of your patient.        Sincerely,        Mike Murillo MD

## 2024-08-22 NOTE — PROGRESS NOTES
RADIATION ONCOLOGY FOLLOW UP NOTE    DATE OF FOLLOW UP: 8/22/2024     PATIENT NAME: Teresa Sanderson    DIAGNOSIS: Bone metastases secondary to primary breast cancer         RADIATION HISTORY:  Radiation Oncology - Course: 1   Treatment Site Modality Dose (cGy) per Fraction Number of Fractions Total Dose (cGy) Dates of Treatment Elapsed Days   L3-L5 18 X 300 10 3000 12/23/2020 to 1/07/2021  15     Radiation Oncology - Course: 2   Treatment Site Modality Dose (cGy) per Fraction Number of Fractions Total Dose (cGy) Dates of Treatment Elapsed Days   Lt Iliac Bone 18 X 400 5 2000 5/01/2023 to 5/05/2023 5     Radiation Oncology - Course: 3   Treatment Site Modality Dose (cGy) per Fraction Number of Fractions Total Dose (cGy) Dates of Treatment Elapsed Days   Lt Acetabulum and SI re-tx  06 X 200 15 3000 6/27/2024 to 7/18/2024  21     SUBJECTIVE:    Ms. Sanderson returns today for 1 month follow-up.  She reports no pain in her left SI joint, but does complain of 6/10 low back pain which decreases to 4/10 with oxycodone.  Overall, she complains of generalized bodyaches with her low back pain being similar pain level to everything else.  She experienced loose stools during the last few days of her radiotherapy, but this resolved soon after finishing radiation.  She has not experienced any urinary difficulties.    OBJECTIVE: /87   Pulse 64   Resp 16   SpO2 99%   Gen: NAD  Lungs: CTAB  Heart: RRR  Neuro: CNII-XII grossly intact, gait normal    RECOMMENDATION:   Return to our clinic for followup as needed.  Followup with medical oncology as needed.    Mike Murillo M.D.  Department of Radiation Oncology  [facility]

## 2024-08-23 ENCOUNTER — THERAPY VISIT (OUTPATIENT)
Dept: PHYSICAL THERAPY | Facility: CLINIC | Age: 49
End: 2024-08-23
Attending: STUDENT IN AN ORGANIZED HEALTH CARE EDUCATION/TRAINING PROGRAM
Payer: MEDICARE

## 2024-08-23 DIAGNOSIS — G89.3 CANCER ASSOCIATED PAIN: ICD-10-CM

## 2024-08-23 DIAGNOSIS — C79.51 MALIGNANT NEOPLASM METASTATIC TO BONE (H): ICD-10-CM

## 2024-08-23 DIAGNOSIS — Z17.0 MALIGNANT NEOPLASM OF OVERLAPPING SITES OF LEFT BREAST IN FEMALE, ESTROGEN RECEPTOR POSITIVE (H): Primary | ICD-10-CM

## 2024-08-23 DIAGNOSIS — M79.661 PAIN OF RIGHT LOWER LEG: ICD-10-CM

## 2024-08-23 DIAGNOSIS — C50.812 MALIGNANT NEOPLASM OF OVERLAPPING SITES OF LEFT BREAST IN FEMALE, ESTROGEN RECEPTOR POSITIVE (H): Primary | ICD-10-CM

## 2024-08-23 PROCEDURE — 97535 SELF CARE MNGMENT TRAINING: CPT | Mod: GP | Performed by: PHYSICAL THERAPIST

## 2024-08-23 PROCEDURE — 97110 THERAPEUTIC EXERCISES: CPT | Mod: GP | Performed by: PHYSICAL THERAPIST

## 2024-08-28 DIAGNOSIS — C50.812 MALIGNANT NEOPLASM OF OVERLAPPING SITES OF LEFT BREAST IN FEMALE, ESTROGEN RECEPTOR POSITIVE (H): ICD-10-CM

## 2024-08-28 DIAGNOSIS — C50.912 CARCINOMA OF LEFT BREAST METASTATIC TO BONE (H): Primary | ICD-10-CM

## 2024-08-28 DIAGNOSIS — C79.51 CARCINOMA OF LEFT BREAST METASTATIC TO BONE (H): Primary | ICD-10-CM

## 2024-08-28 DIAGNOSIS — Z17.0 MALIGNANT NEOPLASM OF OVERLAPPING SITES OF LEFT BREAST IN FEMALE, ESTROGEN RECEPTOR POSITIVE (H): ICD-10-CM

## 2024-09-04 ENCOUNTER — THERAPY VISIT (OUTPATIENT)
Dept: PHYSICAL THERAPY | Facility: CLINIC | Age: 49
End: 2024-09-04
Attending: STUDENT IN AN ORGANIZED HEALTH CARE EDUCATION/TRAINING PROGRAM
Payer: MEDICARE

## 2024-09-04 ENCOUNTER — ONCOLOGY VISIT (OUTPATIENT)
Dept: ONCOLOGY | Facility: CLINIC | Age: 49
End: 2024-09-04
Attending: NURSE PRACTITIONER
Payer: MEDICARE

## 2024-09-04 ENCOUNTER — APPOINTMENT (OUTPATIENT)
Dept: LAB | Facility: CLINIC | Age: 49
End: 2024-09-04
Attending: INTERNAL MEDICINE
Payer: MEDICARE

## 2024-09-04 ENCOUNTER — INFUSION THERAPY VISIT (OUTPATIENT)
Dept: ONCOLOGY | Facility: CLINIC | Age: 49
End: 2024-09-04
Attending: INTERNAL MEDICINE
Payer: MEDICARE

## 2024-09-04 VITALS
SYSTOLIC BLOOD PRESSURE: 131 MMHG | TEMPERATURE: 98.4 F | HEART RATE: 63 BPM | DIASTOLIC BLOOD PRESSURE: 85 MMHG | OXYGEN SATURATION: 100 % | BODY MASS INDEX: 32.78 KG/M2 | RESPIRATION RATE: 18 BRPM | WEIGHT: 235 LBS

## 2024-09-04 DIAGNOSIS — C50.812 MALIGNANT NEOPLASM OF OVERLAPPING SITES OF LEFT BREAST IN FEMALE, ESTROGEN RECEPTOR POSITIVE (H): Primary | ICD-10-CM

## 2024-09-04 DIAGNOSIS — Z17.0 MALIGNANT NEOPLASM OF OVERLAPPING SITES OF LEFT BREAST IN FEMALE, ESTROGEN RECEPTOR POSITIVE (H): Primary | ICD-10-CM

## 2024-09-04 DIAGNOSIS — G89.3 CANCER ASSOCIATED PAIN: ICD-10-CM

## 2024-09-04 DIAGNOSIS — C50.912 CARCINOMA OF LEFT BREAST METASTATIC TO BONE (H): ICD-10-CM

## 2024-09-04 DIAGNOSIS — C79.51 CARCINOMA OF LEFT BREAST METASTATIC TO BONE (H): ICD-10-CM

## 2024-09-04 DIAGNOSIS — M79.661 PAIN OF RIGHT LOWER LEG: ICD-10-CM

## 2024-09-04 DIAGNOSIS — C79.51 MALIGNANT NEOPLASM METASTATIC TO BONE (H): ICD-10-CM

## 2024-09-04 LAB
ALBUMIN SERPL BCG-MCNC: 4.1 G/DL (ref 3.5–5.2)
ALP SERPL-CCNC: 87 U/L (ref 40–150)
ALT SERPL W P-5'-P-CCNC: 12 U/L (ref 0–50)
ANION GAP SERPL CALCULATED.3IONS-SCNC: 11 MMOL/L (ref 7–15)
AST SERPL W P-5'-P-CCNC: 20 U/L (ref 0–45)
BASOPHILS # BLD AUTO: 0 10E3/UL (ref 0–0.2)
BASOPHILS NFR BLD AUTO: 1 %
BILIRUB SERPL-MCNC: 0.2 MG/DL
BUN SERPL-MCNC: 12 MG/DL (ref 6–20)
CALCIUM SERPL-MCNC: 9.6 MG/DL (ref 8.8–10.4)
CHLORIDE SERPL-SCNC: 107 MMOL/L (ref 98–107)
CREAT SERPL-MCNC: 0.96 MG/DL (ref 0.51–0.95)
EGFRCR SERPLBLD CKD-EPI 2021: 72 ML/MIN/1.73M2
EOSINOPHIL # BLD AUTO: 0.1 10E3/UL (ref 0–0.7)
EOSINOPHIL NFR BLD AUTO: 3 %
ERYTHROCYTE [DISTWIDTH] IN BLOOD BY AUTOMATED COUNT: 13.8 % (ref 10–15)
GLUCOSE SERPL-MCNC: 112 MG/DL (ref 70–99)
HCO3 SERPL-SCNC: 24 MMOL/L (ref 22–29)
HCT VFR BLD AUTO: 32 % (ref 35–47)
HGB BLD-MCNC: 10.6 G/DL (ref 11.7–15.7)
IMM GRANULOCYTES # BLD: 0 10E3/UL
IMM GRANULOCYTES NFR BLD: 1 %
LYMPHOCYTES # BLD AUTO: 1 10E3/UL (ref 0.8–5.3)
LYMPHOCYTES NFR BLD AUTO: 35 %
MCH RBC QN AUTO: 34.8 PG (ref 26.5–33)
MCHC RBC AUTO-ENTMCNC: 33.1 G/DL (ref 31.5–36.5)
MCV RBC AUTO: 105 FL (ref 78–100)
MONOCYTES # BLD AUTO: 0.2 10E3/UL (ref 0–1.3)
MONOCYTES NFR BLD AUTO: 8 %
NEUTROPHILS # BLD AUTO: 1.5 10E3/UL (ref 1.6–8.3)
NEUTROPHILS NFR BLD AUTO: 52 %
NRBC # BLD AUTO: 0 10E3/UL
NRBC BLD AUTO-RTO: 0 /100
PLATELET # BLD AUTO: 178 10E3/UL (ref 150–450)
POTASSIUM SERPL-SCNC: 4.2 MMOL/L (ref 3.4–5.3)
PROT SERPL-MCNC: 7.5 G/DL (ref 6.4–8.3)
RBC # BLD AUTO: 3.05 10E6/UL (ref 3.8–5.2)
SODIUM SERPL-SCNC: 142 MMOL/L (ref 135–145)
WBC # BLD AUTO: 2.9 10E3/UL (ref 4–11)

## 2024-09-04 PROCEDURE — 96365 THER/PROPH/DIAG IV INF INIT: CPT

## 2024-09-04 PROCEDURE — 82040 ASSAY OF SERUM ALBUMIN: CPT

## 2024-09-04 PROCEDURE — 96402 CHEMO HORMON ANTINEOPL SQ/IM: CPT

## 2024-09-04 PROCEDURE — G0463 HOSPITAL OUTPT CLINIC VISIT: HCPCS | Performed by: NURSE PRACTITIONER

## 2024-09-04 PROCEDURE — 250N000011 HC RX IP 250 OP 636: Performed by: PHYSICIAN ASSISTANT

## 2024-09-04 PROCEDURE — 97110 THERAPEUTIC EXERCISES: CPT | Mod: GP | Performed by: PHYSICAL THERAPIST

## 2024-09-04 PROCEDURE — 99213 OFFICE O/P EST LOW 20 MIN: CPT | Performed by: NURSE PRACTITIONER

## 2024-09-04 PROCEDURE — 258N000003 HC RX IP 258 OP 636: Performed by: PHYSICIAN ASSISTANT

## 2024-09-04 PROCEDURE — 86300 IMMUNOASSAY TUMOR CA 15-3: CPT

## 2024-09-04 PROCEDURE — 36415 COLL VENOUS BLD VENIPUNCTURE: CPT

## 2024-09-04 PROCEDURE — 82378 CARCINOEMBRYONIC ANTIGEN: CPT

## 2024-09-04 PROCEDURE — 85004 AUTOMATED DIFF WBC COUNT: CPT

## 2024-09-04 RX ORDER — ZOLEDRONIC ACID 0.04 MG/ML
4 INJECTION, SOLUTION INTRAVENOUS ONCE
Status: COMPLETED | OUTPATIENT
Start: 2024-09-04 | End: 2024-09-04

## 2024-09-04 RX ORDER — LAMOTRIGINE 25 MG/1
500 TABLET ORAL ONCE
Status: COMPLETED | OUTPATIENT
Start: 2024-09-04 | End: 2024-09-04

## 2024-09-04 RX ADMIN — SODIUM CHLORIDE 250 ML: 9 INJECTION, SOLUTION INTRAVENOUS at 17:01

## 2024-09-04 RX ADMIN — ZOLEDRONIC ACID 4 MG: 0.04 INJECTION, SOLUTION INTRAVENOUS at 17:01

## 2024-09-04 RX ADMIN — FULVESTRANT 500 MG: 50 INJECTION, SOLUTION INTRAMUSCULAR at 17:06

## 2024-09-04 ASSESSMENT — PAIN SCALES - GENERAL: PAINLEVEL: EXTREME PAIN (8)

## 2024-09-04 NOTE — PROGRESS NOTES
Infusion Nursing Note:  Teresa Sanderson presents today for Cycle 14 Day 1 Zometa + Cycle 10 Day 1 Fulvestrant.    Patient seen by provider today: Yes: Tierney Berger CNP   present during visit today: Not Applicable.    Note: Pt presents to infusion today feeling well. Pt offers no new concerns following visit with provider today.    Pt confirms she is taking Vitamin D and Calcium as prescribed.    Intravenous Access:  Peripheral IV placed.    Treatment Conditions:  Lab Results   Component Value Date    HGB 10.6 (L) 09/04/2024    WBC 2.9 (L) 09/04/2024    ANEU 1.3 (L) 01/15/2024    ANEUTAUTO 1.5 (L) 09/04/2024     09/04/2024        Lab Results   Component Value Date     09/04/2024    POTASSIUM 4.2 09/04/2024    MAG 1.8 07/01/2024    CR 0.96 (H) 09/04/2024    NANDO 9.6 09/04/2024    BILITOTAL 0.2 09/04/2024    ALBUMIN 4.1 09/04/2024    ALT 12 09/04/2024    AST 20 09/04/2024     Results reviewed, labs MET treatment parameters, ok to proceed with treatment.      Post Infusion Assessment:  Patient tolerated infusion without incident.  Patient tolerated two Fulvestrant injections to bilateral ventrogluteal without incident.  Blood return noted pre and post infusion.  Site patent and intact, free from redness, edema or discomfort.  No evidence of extravasations.  Access discontinued per protocol.       Discharge Plan:   Patient declined prescription refills.  Discharge instructions reviewed with: Patient and Family.  Patient and/or family verbalized understanding of discharge instructions and all questions answered.  AVS to patient via [employer].  Tierney Berger CNP requesting future infusion appointments via check out orders.  Patient discharged in stable condition accompanied by: self and family.  Departure Mode: Ambulatory.      Luna Panchal RN

## 2024-09-04 NOTE — PATIENT INSTRUCTIONS
[facility] Triage and after hours / weekends / holidays:  [phone]    Please call the triage or after hours line if you experience a temperature greater than or equal to 100.4, shaking chills, have uncontrolled nausea, vomiting and/or diarrhea, dizziness, shortness of breath, chest pain, bleeding, unexplained bruising, or if you have any other new/concerning symptoms, questions or concerns.      If you are having any concerning symptoms or wish to speak to a provider before your next infusion visit, please call triage to notify them so we can adequately serve you.     If you need a refill on a narcotic prescription or other medication, please call before your infusion appointment.                September 2024 Sunday Monday Tuesday Wednesday Thursday Friday Saturday   1     2     3     4    PT BREAST CANCER REHAB TREAT  11:00 AM   (60 min.)   Carolyn Espinoza, PT   [facility]    LAB PERIPHERAL   3:15 PM   (15 min.)   [facility] LAB DRAW   [facility]    RETURN CCSL   3:45 PM   (45 min.)   Tierney Berger APRN CNP   [facility]    ONC INFUSION 0.5 HR (30 MIN)   4:00 PM   (30 min.)    ONC INFUSION NURSE   [facility] 5     6     7       8     9     10    [facility]  11:45 AM   (60 min.)   Lavonne Frazier MD   [facility] [place] 11     12     13     14       15     16     17     18     19    PT BREAST CANCER REHAB TREAT   1:00 PM   (60 min.)   Carolyn Espinoza, PT   [facility] 20     21       22     23     24    PET ONCOLOGY WHOLE BODY  11:30 AM   (30 min.)   [id number]   [facility] 25     26    PT BREAST CANCER REHAB TREAT  11:00 AM   (60 min.)   Carolyn Espinoza, PT   [facility] 27     28       29     30    NEW - [place]  10:30 AM   (60 min.)   Dipika Ignacio,  PharmD   [facility]                                      October 2024 Sunday Monday Tuesday Wednesday Thursday Friday Saturday             1    LAB PERIPHERAL  12:45 PM   (15 min.)    [facility] DRAW   [facility]    RETURN CCSL   1:00 PM   (30 min.)   Jahaira Plunkett MD   [facility] 2     3     4     5       6     7     8     9     10     11     12       13     14     15     16     17     18     19       20     21     22     23     24     25    ADULT PSYCHIATRY RETURN   3:05 PM   (30 min.)   Odette Peralta MD   [facility] 26       27     28     29     30     31                              Recent Results (from the past 24 hour(s))   Comprehensive metabolic panel    Collection Time: 09/04/24  3:59 PM   Result Value Ref Range    Sodium 142 135 - 145 mmol/L    Potassium 4.2 3.4 - 5.3 mmol/L    Carbon Dioxide (CO2) 24 22 - 29 mmol/L    Anion Gap 11 7 - 15 mmol/L    Urea Nitrogen 12.0 6.0 - 20.0 mg/dL    Creatinine 0.96 (H) 0.51 - 0.95 mg/dL    GFR Estimate 72 >60 mL/min/1.73m2    Calcium 9.6 8.8 - 10.4 mg/dL    Chloride 107 98 - 107 mmol/L    Glucose 112 (H) 70 - 99 mg/dL    Alkaline Phosphatase 87 40 - 150 U/L    AST 20 0 - 45 U/L    ALT 12 0 - 50 U/L    Protein Total 7.5 6.4 - 8.3 g/dL    Albumin 4.1 3.5 - 5.2 g/dL    Bilirubin Total 0.2 <=1.2 mg/dL   CBC with platelets and differential    Collection Time: 09/04/24  3:59 PM   Result Value Ref Range    WBC Count 2.9 (L) 4.0 - 11.0 10e3/uL    RBC Count 3.05 (L) 3.80 - 5.20 10e6/uL    Hemoglobin 10.6 (L) 11.7 - 15.7 g/dL    Hematocrit 32.0 (L) 35.0 - 47.0 %     (H) 78 - 100 fL    MCH 34.8 (H) 26.5 - 33.0 pg    MCHC 33.1 31.5 - 36.5 g/dL    RDW 13.8 10.0 - 15.0 %    Platelet Count 178 150 - 450 10e3/uL    % Neutrophils 52 %    % Lymphocytes 35 %    % Monocytes 8 %    % Eosinophils 3 %     % Basophils 1 %    % Immature Granulocytes 1 %    NRBCs per 100 WBC 0 <1 /100    Absolute Neutrophils 1.5 (L) 1.6 - 8.3 10e3/uL    Absolute Lymphocytes 1.0 0.8 - 5.3 10e3/uL    Absolute Monocytes 0.2 0.0 - 1.3 10e3/uL    Absolute Eosinophils 0.1 0.0 - 0.7 10e3/uL    Absolute Basophils 0.0 0.0 - 0.2 10e3/uL    Absolute Immature Granulocytes 0.0 <=0.4 10e3/uL    Absolute NRBCs 0.0 10e3/uL

## 2024-09-04 NOTE — NURSING NOTE
Chief Complaint   Patient presents with    Blood Draw     Labs drawn via PIV by vascular access in lab, vitals taken.     Labs drawn from PIV placed by Vascular Access. Line flushed with saline. Vitals taken. Pt checked in for appointment(s).    Rebeka Quiñones RN

## 2024-09-04 NOTE — PROGRESS NOTES
Oncology Visit:   Date on this visit: Sep 4, 2024    Diagnosis:  ER positive left breast cancer metastasized to bones.     Primary Physician: No Ref-Primary, Physician     History Of Present Illness:    Ms. Sanderson is a 48 year old female with a h/o tobacco abuse and DVTs with left breast cancer metastasized to bone. She presented to [facility] ED with back pain on 12/5/2020. MRI of the L-spine showed an abnormal L4 lesion with associated right paraspinal mass, abnormalities in L5 and the left iliac bone were also seen. CT C/A/P showed a left breast mass, lytic lesions of T7, L4, and the pelvis, and a 3 cm lesion in the kidney (thought to be a cyst). Ultrasound of the bilateral lower extremities showed a non-occlusive thrombus in the left popliteal vein. Mammogram and ultrasound of the bilateral breasts on 12/17/2020 showed a spiculated mass measuring at least 7.8 cm at 12-1:00 left breast extending from the nipple to 9 cm from the nipple with associated nipple retraction. This mass was biopsied, and showed IDC with surrounding DCIS, grade 3, ER+ 90%, and IA+ 75%.  HER2 was equivocal in approximately 35% of tumor cells by FISH and was negative by IHC.    Metastatic Breast Cancer Treatment:  12/23/2021 - 1/7/2021  Radiation (3000 cGy) to the lumbar spine.  1/29/2021 - present  Ibrance, zoladex, and anastrozole.  5/17/2021 radiofrequency ablation, kyphoplasty to L4  8/20/2021  Bilateral salpingo-oophorectomies, Ibrance and anastrozole.  She was off anastrozole 12/2021 - 2/2022 and off Ibrance 01/2022 - 02/2022 due to stress and impending homelessness. Off both again 03/2022-04/2022 due to death of her son but restarted in 05/2022.  04/2023  PET/CT with progression in 2 small metastases in the left pelvis.  Palbociclib continued.  Anastrozole changed to exemestane  5/5/2023  Completed radiation, 2000 cGy in 5 fractions, to the left ilium.  12/19/2023 Left breast biopsy  [facility] NGS:   ER positive, 3+  100%   IA positive,  1+, 5%   HER2 negative.   AR positive, 1+, 35%   MMR proficient   PD-L1 negative, CPS 0   PTEN positive, 1+ 100%  *Of note, all of the above was IHC, DNA quantity not sufficient for NGS  1/18/24 - present  Fulvestrant + abemaciclib.  Abemaciclib dose reduced to 100 mg PO BID due to hand foot syndrome.  7/18/24 - completed XRT to the L1-S1, Left acetabulum 3000 cGy    Interval History:   -She is seen today while in infusion for Zometa and Fulvestrant.  She is also being treated with abemaciclib for her metastatic breast cancer.  -Mild nausea occasionally.  -No diarrhea.  -Denies cough, chest pain, shortness of breath at rest.    -Eating about once a day r/t lack of appetite but her weight has been stable.   -She denies any unusual bleeding or bruising.  She continues to take her Xarelto daily.  -She did have some increase in her bony discomfort in the few days surrounding her last injection and needed to increase her oxycodone use.  This did get better and she is feeling at her baseline today in terms of pain.    Past Medical/Surgical History:   Past Medical History:   Diagnosis Date    Anxiety     Breast CA (H) 12/2020    Depression     DVT (deep venous thrombosis) (H) 2014    Left breast mass     x approximately 4-5 months    Pulmonary emboli (H)     Pyelonephritis     Schizoaffective disorder (H)     Tobacco use      Past Surgical History:   Procedure Laterality Date    COLONOSCOPY N/A 7/8/2022    Procedure: COLONOSCOPY, WITH POLYPECTOMY;  Surgeon: Ham Cano MD;  [place] [facility] [place]    [place] LUMBAR KYPHOPLASTY VERTEBRAE  5/17/2021    LAPAROSCOPIC SALPINGO-OOPHORECTOMY Bilateral 8/20/2021    Procedure: BILATERAL SALPINGO-OOPHORECTOMY, LAPAROSCOPIC;  Surgeon: Rory Lopez MD;  Location: [facility] OR    TUBAL LIGATION  1998        Allergies   Allergen Reactions    Contrast Dye      Pt developed nausea after isovue 370 injection on 6/9/21        Current Outpatient Medications   Medication Sig Dispense Refill     abemaciclib (VERZENIO) 100 MG tablet Take 1 tablet (100 mg) by mouth 2 times daily for 28 days 56 tablet 0    gabapentin (NEURONTIN) 300 MG capsule Take 2 capsules in the morning, 2 capsules in afternoon, and 3 capsules at bedtime. 210 capsule 1    methocarbamol (ROBAXIN) 500 MG tablet Take 2-3 tablets (1,000-1,500 mg) by mouth 3 times daily as needed for muscle spasms 210 tablet 2    methylPREDNISolone (MEDROL) 32 MG tablet 12 hours prior to scan and 2 hours prior to scan 2 tablet 3    naloxone (NARCAN) 4 MG/0.1ML nasal spray Spray 1 spray (4 mg) into one nostril alternating nostrils once as needed for opioid reversal every 2-3 minutes until assistance arrives (Patient not taking: Reported on 4/8/2024) 0.2 mL 0    oxyCODONE (ROXICODONE) 5 MG tablet Take 1-2 tablets (5-10 mg) by mouth every 4 hours as needed for pain 120 tablet 0    QUEtiapine (SEROQUEL) 400 MG tablet Take 1 tablet (400 mg) by mouth at bedtime 30 tablet 2    QUEtiapine (SEROQUEL) 50 MG tablet Take 0.5-1 tablets (25-50 mg) by mouth 2 times daily as needed (for sleep or acute agitation) 60 tablet 2    rivaroxaban ANTICOAGULANT (XARELTO) 20 MG TABS tablet Take 1 tablet (20 mg) by mouth daily (with dinner) 90 tablet 3    sertraline (ZOLOFT) 100 MG tablet Take 1 tablet (100 mg) by mouth daily 30 tablet 2    Vitamin D3 (CHOLECALCIFEROL) 25 mcg (1000 units) tablet Take 1 tablet (25 mcg) by mouth daily 90 tablet 3     No current facility-administered medications for this visit.   '  Physical Exam:   /85   Pulse 63   Temp 98.4  F (36.9  C)   Resp 18   Wt 106.6 kg (235 lb)   SpO2 100%   BMI 32.78 kg/m    General:  Well appearing adult female in NAD.  Alert and oriented x 3.  HEENT:  Normocephalic.  Sclera anicteric. MMM.    Lymph:  No palpable cervical, supraclavicular, or axillary LAD.   Chest:  CTA bilaterally.  No wheezes or crackles.  CV:  RRR.  No audible m/r/g.  Abd:  Soft/ND/NT.  No palpable hepatosplenomegaly.  Ext:  No edema of the  bilateral lower extremities.    Neuro:  Cranial nerves grossly intact. Gait stable.  No tremor or dyskinetic movements.  Psych:  Mood and affect appear normal.    Skin: She has some erythema, xerosis, thickened skin, and calluses on the soles of her feet and to a lesser degree on her hands     Laboratory/Imaging Studies:   CBC with total white blood cell count of 2.9, hemoglobin 10 platelets 178K her ANC is 1.5.    No new imaging.     ASSESSMENT/PLAN:   48 year old female with history of DVT and ER positive left breast cancer metastasized to bones.    1.  Metastatic breast cancer: She has been on her most recent treatment with abemaciclib and fulvestrant since 01/2024.    - Continue monthly faslodex and daily Abemaciclib dose reduced to 100 mg PO BID secondary to hand foot syndrome.  This is not a common side effect of abemaciclib but there is a reports of a rare cases.  Symptoms improved with the dose reduction.  - PET/CT performed on 6/6 showed an increase in  hypermetabolic uptake in the left ilium and the left acetabulum.  Discussed these sites of progression are small, nevertheless she is symptomatic of progression at the left ilium site. She completed XRT and has had some improvement in her LEFT acetabular pain however her low back remains stable.  She is unsure if this low back pain is from disease and/or chronic as she has had low back pain her entire adult life.   She is scheduled for a repeat PET this fall to monitor response to radiation.    - Recommend tissue biopsy vs Guardant 360 liquid NGS at time of progression to help guide next treatment (capivasertib vs everolimus vs elacestrant)    2.  Bone metastases/low back pain with LLE sciatica:  She is s/p XRT to the left ilium as well as the lumbar spine and kyphoplasty of L4--with some extravasation of cement which procedularist is aware of and recommended continued AC indefinitely.   - She currently is on oxycodone, Robaxin, and gabapentin.  Ongoing  "follow up with palliative medicine.  - Tylenol prn  - Receiving Zometa once every 3 months. Done today.  - Completed palliative radiation to left ilium/left SI joint as above.  - As above, she did have a pain flare after her last dose of Faslodex, but this coincided with a celebration with out of town family for her birthday so maybe a bit more activity?  We discussed that she will monitor with this injection and see if this happens again.  She managed pain with increase in her pain meds.     3.  Schizoaffective disorder, bipolar type: No change with last visit.  She is on treatment with Seroquel and Zoloft. Ongoing follow up with psychiatry.     4.  HEME  -ho DVT:  Was on Xarelto.  Plan to continue until contraindicated given metastatic disease.  Was taking this \"sporadically\"   -ED visit on 7/1 with a stroke work up.  PE was found, she now understands she needs to stay on Xarelto indefinitely and we review this again today and she is taking it daily.     5. Diarrhea: G1. Secondary to abemaciclib.  Imodium prn for 3 loose stools in over 24 hours. She feels her diarrhea is well managed on this regimen.  No new concerns today.     6.  HFS: Continue thick emollients.     7.  FELTON:  Secondary to abemaciclib.  Continue to avoid NSAIDs and encouraged good hydration.     Tierney Berger, R[place], CNP  [facility]  [address] [place] [address]  [phone]     28 minutes spent on the date of the encounter doing chart review, review of test results, interpretation of tests, patient visit, and documentation.                    "

## 2024-09-04 NOTE — LETTER
9/4/2024      Teresa Sanderson  [address]  [facility] [address]      Dear Colleague,    Thank you for referring your patient, Teresa Sanderson, to the [facility]. Please see a copy of my visit note below.    Oncology Visit:   Date on this visit: Sep 4, 2024    Diagnosis:  ER positive left breast cancer metastasized to bones.     Primary Physician: No Ref-Primary, Physician     History Of Present Illness:    Ms. Sanderson is a 48 year old female with a h/o tobacco abuse and DVTs with left breast cancer metastasized to bone. She presented to [facility] with back pain on 12/5/2020. MRI of the L-spine showed an abnormal L4 lesion with associated right paraspinal mass, abnormalities in L5 and the left iliac bone were also seen. CT C/A/P showed a left breast mass, lytic lesions of T7, L4, and the pelvis, and a 3 cm lesion in the kidney (thought to be a cyst). Ultrasound of the bilateral lower extremities showed a non-occlusive thrombus in the left popliteal vein. Mammogram and ultrasound of the bilateral breasts on 12/17/2020 showed a spiculated mass measuring at least 7.8 cm at 12-1:00 left breast extending from the nipple to 9 cm from the nipple with associated nipple retraction. This mass was biopsied, and showed IDC with surrounding DCIS, grade 3, ER+ 90%, and NC+ 75%.  HER2 was equivocal in approximately 35% of tumor cells by FISH and was negative by IHC.    Metastatic Breast Cancer Treatment:  12/23/2021 - 1/7/2021  Radiation (3000 cGy) to the lumbar spine.  1/29/2021 - present  Ibrance, zoladex, and anastrozole.  5/17/2021 radiofrequency ablation, kyphoplasty to L4  8/20/2021  Bilateral salpingo-oophorectomies, Ibrance and anastrozole.  She was off anastrozole 12/2021 - 2/2022 and off Ibrance 01/2022 - 02/2022 due to stress and impending homelessness. Off both again 03/2022-04/2022 due to death of her son but restarted in 05/2022.  04/2023  PET/CT with progression  in 2 small metastases in the left pelvis.  Palbociclib continued.  Anastrozole changed to exemestane  5/5/2023  Completed radiation, 2000 cGy in 5 fractions, to the left ilium.  12/19/2023 Left breast biopsy  [facility] NGS:   ER positive, 3+  100%   AZ positive, 1+, 5%   HER2 negative.   AR positive, 1+, 35%   MMR proficient   PD-L1 negative, CPS 0   PTEN positive, 1+ 100%  *Of note, all of the above was IHC, DNA quantity not sufficient for NGS  1/18/24 - present  Fulvestrant + abemaciclib.  Abemaciclib dose reduced to 100 mg PO BID due to hand foot syndrome.  7/18/24 - completed XRT to the L1-S1, Left acetabulum 3000 cGy    Interval History:   -She is seen today while in infusion for Zometa and Fulvestrant.  She is also being treated with abemaciclib for her metastatic breast cancer.  -Mild nausea occasionally.  -No diarrhea.  -Denies cough, chest pain, shortness of breath at rest.    -Eating about once a day r/t lack of appetite but her weight has been stable.   -She denies any unusual bleeding or bruising.  She continues to take her Xarelto daily.  -She did have some increase in her bony discomfort in the few days surrounding her last injection and needed to increase her oxycodone use.  This did get better and she is feeling at her baseline today in terms of pain.    Past Medical/Surgical History:   Past Medical History:   Diagnosis Date     Anxiety      Breast CA (H) 12/2020     Depression      DVT (deep venous thrombosis) (H) 2014     Left breast mass     x approximately 4-5 months     Pulmonary emboli (H)      Pyelonephritis      Schizoaffective disorder (H)      Tobacco use      Past Surgical History:   Procedure Laterality Date     COLONOSCOPY N/A 7/8/2022    Procedure: COLONOSCOPY, WITH POLYPECTOMY;  Surgeon: Ham Cano MD;  Location: [facility] OR     IR LUMBAR KYPHOPLASTY VERTEBRAE  5/17/2021     LAPAROSCOPIC SALPINGO-OOPHORECTOMY Bilateral 8/20/2021    Procedure: BILATERAL SALPINGO-OOPHORECTOMY, LAPAROSCOPIC;   Surgeon: Rory Lopez MD;  Location: [facility] OR     TUBAL LIGATION  1998        Allergies   Allergen Reactions     Contrast Dye      Pt developed nausea after isovue 370 injection on 6/9/21        Current Outpatient Medications   Medication Sig Dispense Refill     abemaciclib (VERZENIO) 100 MG tablet Take 1 tablet (100 mg) by mouth 2 times daily for 28 days 56 tablet 0     gabapentin (NEURONTIN) 300 MG capsule Take 2 capsules in the morning, 2 capsules in afternoon, and 3 capsules at bedtime. 210 capsule 1     methocarbamol (ROBAXIN) 500 MG tablet Take 2-3 tablets (1,000-1,500 mg) by mouth 3 times daily as needed for muscle spasms 210 tablet 2     methylPREDNISolone (MEDROL) 32 MG tablet 12 hours prior to scan and 2 hours prior to scan 2 tablet 3     naloxone (NARCAN) 4 MG/0.1ML nasal spray Spray 1 spray (4 mg) into one nostril alternating nostrils once as needed for opioid reversal every 2-3 minutes until assistance arrives (Patient not taking: Reported on 4/8/2024) 0.2 mL 0     oxyCODONE (ROXICODONE) 5 MG tablet Take 1-2 tablets (5-10 mg) by mouth every 4 hours as needed for pain 120 tablet 0     QUEtiapine (SEROQUEL) 400 MG tablet Take 1 tablet (400 mg) by mouth at bedtime 30 tablet 2     QUEtiapine (SEROQUEL) 50 MG tablet Take 0.5-1 tablets (25-50 mg) by mouth 2 times daily as needed (for sleep or acute agitation) 60 tablet 2     rivaroxaban ANTICOAGULANT (XARELTO) 20 MG TABS tablet Take 1 tablet (20 mg) by mouth daily (with dinner) 90 tablet 3     sertraline (ZOLOFT) 100 MG tablet Take 1 tablet (100 mg) by mouth daily 30 tablet 2     Vitamin D3 (CHOLECALCIFEROL) 25 mcg (1000 units) tablet Take 1 tablet (25 mcg) by mouth daily 90 tablet 3     No current facility-administered medications for this visit.   '  Physical Exam:   /85   Pulse 63   Temp 98.4  F (36.9  C)   Resp 18   Wt 106.6 kg (235 lb)   SpO2 100%   BMI 32.78 kg/m    General:  Well appearing adult female in NAD.  Alert and  oriented x 3.  HEENT:  Normocephalic.  Sclera anicteric. MMM.    Lymph:  No palpable cervical, supraclavicular, or axillary LAD.   Chest:  CTA bilaterally.  No wheezes or crackles.  CV:  RRR.  No audible m/r/g.  Abd:  Soft/ND/NT.  No palpable hepatosplenomegaly.  Ext:  No edema of the bilateral lower extremities.    Neuro:  Cranial nerves grossly intact. Gait stable.  No tremor or dyskinetic movements.  Psych:  Mood and affect appear normal.    Skin: She has some erythema, xerosis, thickened skin, and calluses on the soles of her feet and to a lesser degree on her hands     Laboratory/Imaging Studies:   CBC with total white blood cell count of 2.9, hemoglobin 10 platelets 178K her ANC is 1.5.    No new imaging.     ASSESSMENT/PLAN:   48 year old female with history of DVT and ER positive left breast cancer metastasized to bones.    1.  Metastatic breast cancer: She has been on her most recent treatment with abemaciclib and fulvestrant since 01/2024.    - Continue monthly faslodex and daily Abemaciclib dose reduced to 100 mg PO BID secondary to hand foot syndrome.  This is not a common side effect of abemaciclib but there is a reports of a rare cases.  Symptoms improved with the dose reduction.  - PET/CT performed on 6/6 showed an increase in  hypermetabolic uptake in the left ilium and the left acetabulum.  Discussed these sites of progression are small, nevertheless she is symptomatic of progression at the left ilium site. She completed XRT and has had some improvement in her LEFT acetabular pain however her low back remains stable.  She is unsure if this low back pain is from disease and/or chronic as she has had low back pain her entire adult life.   She is scheduled for a repeat PET this fall to monitor response to radiation.    - Recommend tissue biopsy vs Guardant 360 liquid NGS at time of progression to help guide next treatment (capivasertib vs everolimus vs elacestrant)    2.  Bone metastases/low back pain  "with LLE sciatica:  She is s/p XRT to the left ilium as well as the lumbar spine and kyphoplasty of L4--with some extravasation of cement which procedularist is aware of and recommended continued AC indefinitely.   - She currently is on oxycodone, Robaxin, and gabapentin.  Ongoing follow up with palliative medicine.  - Tylenol prn  - Receiving Zometa once every 3 months. Done today.  - Completed palliative radiation to left ilium/left SI joint as above.  - As above, she did have a pain flare after her last dose of Faslodex, but this coincided with a celebration with out of town family for her birthday so maybe a bit more activity?  We discussed that she will monitor with this injection and see if this happens again.  She managed pain with increase in her pain meds.     3.  Schizoaffective disorder, bipolar type: No change with last visit.  She is on treatment with Seroquel and Zoloft. Ongoing follow up with psychiatry.     4.  HEME  -ho DVT:  Was on Xarelto.  Plan to continue until contraindicated given metastatic disease.  Was taking this \"sporadically\"   -ED visit on 7/1 with a stroke work up.  PE was found, she now understands she needs to stay on Xarelto indefinitely and we review this again today and she is taking it daily.     5. Diarrhea: G1. Secondary to abemaciclib.  Imodium prn for 3 loose stools in over 24 hours. She feels her diarrhea is well managed on this regimen.  No new concerns today.     6.  HFS: Continue thick emollients.     7.  FELTON:  Secondary to abemaciclib.  Continue to avoid NSAIDs and encouraged good hydration.     Tierney Berger, APRN, CNP  [facility]  [address], [place] [id number] [phone]     28 minutes spent on the date of the encounter doing chart review, review of test results, interpretation of tests, patient visit, and documentation.                      Again, thank you for allowing me to participate in the care of your patient.  "       Sincerely,        RENO Christianson CNP

## 2024-09-05 LAB
CANCER AG15-3 SERPL-ACNC: 57 U/ML
CEA SERPL-MCNC: 4.4 NG/ML

## 2024-09-10 ENCOUNTER — OFFICE VISIT (OUTPATIENT)
Dept: INTERNAL MEDICINE | Facility: CLINIC | Age: 49
End: 2024-09-10
Payer: MEDICARE

## 2024-09-10 VITALS
HEIGHT: 71 IN | WEIGHT: 230.7 LBS | HEART RATE: 79 BPM | OXYGEN SATURATION: 97 % | DIASTOLIC BLOOD PRESSURE: 87 MMHG | BODY MASS INDEX: 32.3 KG/M2 | SYSTOLIC BLOOD PRESSURE: 125 MMHG

## 2024-09-10 DIAGNOSIS — Z23 NEED FOR VACCINATION: ICD-10-CM

## 2024-09-10 DIAGNOSIS — Z11.59 NEED FOR HEPATITIS C SCREENING TEST: ICD-10-CM

## 2024-09-10 DIAGNOSIS — C50.919 MALIGNANT NEOPLASM OF FEMALE BREAST, UNSPECIFIED ESTROGEN RECEPTOR STATUS, UNSPECIFIED LATERALITY, UNSPECIFIED SITE OF BREAST (H): ICD-10-CM

## 2024-09-10 DIAGNOSIS — F41.9 ANXIETY DISORDER, UNSPECIFIED TYPE: ICD-10-CM

## 2024-09-10 DIAGNOSIS — Z11.59 ENCOUNTER FOR SCREENING FOR OTHER VIRAL DISEASES: ICD-10-CM

## 2024-09-10 DIAGNOSIS — F25.0 SCHIZOAFFECTIVE DISORDER, BIPOLAR TYPE (H): ICD-10-CM

## 2024-09-10 DIAGNOSIS — Z11.4 SCREENING FOR HIV (HUMAN IMMUNODEFICIENCY VIRUS): Primary | ICD-10-CM

## 2024-09-10 PROCEDURE — 99214 OFFICE O/P EST MOD 30 MIN: CPT | Mod: GC

## 2024-09-10 PROCEDURE — 90677 PCV20 VACCINE IM: CPT

## 2024-09-10 PROCEDURE — 90656 IIV3 VACC NO PRSV 0.5 ML IM: CPT

## 2024-09-10 PROCEDURE — G0008 ADMIN INFLUENZA VIRUS VAC: HCPCS

## 2024-09-10 PROCEDURE — G0009 ADMIN PNEUMOCOCCAL VACCINE: HCPCS

## 2024-09-10 RX ORDER — SERTRALINE HYDROCHLORIDE 100 MG/1
150 TABLET, FILM COATED ORAL DAILY
Qty: 135 TABLET | Refills: 0 | Status: SHIPPED | OUTPATIENT
Start: 2024-09-10 | End: 2024-12-09

## 2024-09-10 RX ORDER — HYDROXYZINE HYDROCHLORIDE 25 MG/1
50 TABLET, FILM COATED ORAL EVERY 8 HOURS PRN
Qty: 90 TABLET | Refills: 0 | Status: SHIPPED | OUTPATIENT
Start: 2024-09-10 | End: 2024-10-10

## 2024-09-10 ASSESSMENT — PATIENT HEALTH QUESTIONNAIRE - PHQ9
SUM OF ALL RESPONSES TO PHQ QUESTIONS 1-9: 21
10. IF YOU CHECKED OFF ANY PROBLEMS, HOW DIFFICULT HAVE THESE PROBLEMS MADE IT FOR YOU TO DO YOUR WORK, TAKE CARE OF THINGS AT HOME, OR GET ALONG WITH OTHER PEOPLE: SOMEWHAT DIFFICULT
SUM OF ALL RESPONSES TO PHQ QUESTIONS 1-9: 21

## 2024-09-10 NOTE — PROGRESS NOTES
Assessment & Plan     Teresa Sanderson is a 48 yo female with known metastatic breast cancer, currently seen by onc and palliative for mgmt, coming in to establish care and discuss healthcare maintenance. She is having trouble sleeping and having increased anxiety surrounding her disease as of late as well. Pt denies active SI and is able to contract for safety while here despite elevated screening test results. Plan as below, will follow up in 4wks to check in.     (Z11.4) Screening for HIV (human immunodeficiency virus)  (primary encounter diagnosis)  Plan: HIV Screening    (Z11.59) Need for hepatitis C screening test  Plan: Hepatitis C Screen Reflex to HCV RNA Quant and         Genotype    (F25.0) Schizoaffective disorder, bipolar type (H)  Comment: Pt taking 600mg Seroquel nightly to help with her sleep; she takes her 400mg nightly dose and then takes 200mg extra because she is unable to sleep given that her mind is racing and she is experiencing increased anxiety. On my exam and history today, she does not appear manic - she is oriented and interacting well with me, not tangential, not having any delusions or grandiose thoughts, and is not responding to internal stimuli. She is, however, quite tearful and anxious regarding the progression of her disease and questioning why this has happened to her. She is also worried about the financial and emotional impact that this is having and will continue to have on her family, citing concerns for food and housing costs. Teresa confirms that she is able to access all of her medications and has good transportation to [facility] and her [employer]. Denies SI, HI, self harm. No changes in appetite. No hallucinations. Is able to contract for her safety.   Based on this, I would like her to see adult mental health for psychotherapy to have someone to talk these thoughts through with. She seems to be perseverating over existential thoughts and significant anxieties iso ongoing  socioeconomic concerns, unclear prognosis/disease burden, and ongoing pain. I think that this will help calm her thoughts potentially and help with some of her sleep concerns. Additionally, will start Atarax 50mg q8hrs prn for increased anxiety and sleeping difficulties in addition to increasing her sertraline to 150mg daily from 100mg daily. Given that she sees psychiatry and that her self increase in her Seroquel has not actually helped her symptoms, I am hesitant to adjust this at all. Have discussed the appropriate dosing of her Seroquel with her: 400mg at bedtime + 25-50mg BID prn -- this was written down for her edification and have counseled her on the need to stick with this for now. She is due to see psychiatry 10/25 and they may readjust this as they see fit, I have encouraged her to keep this appt. Additionally, will refer to social work given mounting socioeconomic concerns iso malignancy. Pt is appreciative of this. Will see in 4 weeks for follow up on this matters.   Plan: Adult Mental Health  Referral, Social         Work Referral Specific Sites Only - See         Locations in Order, hydrOXYzine HCl (ATARAX) 25        MG tablet, sertraline (ZOLOFT) 100 MG tablet    (F41.9) Anxiety disorder, unspecified type  Comment: As above. Increasing anxiety iso malignancy, c/f food and housing insecurity.   Plan: Adult Mental Health  Referral, Social         Work Referral Specific Sites Only - See         Locations in Order, hydrOXYzine HCl (ATARAX) 25        MG tablet, sertraline (ZOLOFT) 100 MG tablet    (C50.919) Malignant neoplasm of female breast, unspecified estrogen receptor status, unspecified laterality, unspecified site of breast (H)  Comment: Managed by oncology with palliative care on board and RT for osseous lytic lesions. Plan to continue per their recommendations and appreciate their continued cares. PET scan repeat scheduled for 9/24, will CTM.   Plan: Social Work Referral  "Specific Sites Only - See         Locations in Order, Hepatitis B Surface         Antibody    (Z11.59) Encounter for screening for other viral diseases  Plan: Hepatitis B Surface Antibody    (Z23) Need for vaccination  Plan: INFLUENZA VACCINE, SPLIT VIRUS, TRIVALENT,PF         (FLUZONE\FLUARIX)    Attending Addendum:  Patient seen and examined with resident in clinic today.  Pertinent portions of history and exam were independently verified by myself.  I agree with the exam and plan as outlined above with the following modifications: none.  Camilla Laughlin MD  Internal Medicine          BMI  Estimated body mass index is 32.19 kg/m  as calculated from the following:    Height as of this encounter: 1.803 m (5' 10.98\").    Weight as of this encounter: 104.6 kg (230 lb 11.2 oz).       Depression Screening Follow Up        9/10/2024    12:15 PM   PHQ   PHQ-9 Total Score 21   Q9: Thoughts of better off dead/self-harm past 2 weeks More than half the days   F/U: Thoughts of suicide or self-harm No   F/U: Safety concerns No         9/10/2024    12:15 PM   Last PHQ-9   1.  Little interest or pleasure in doing things 2   2.  Feeling down, depressed, or hopeless 2   3.  Trouble falling or staying asleep, or sleeping too much 3   4.  Feeling tired or having little energy 3   5.  Poor appetite or overeating 3   6.  Feeling bad about yourself 2   7.  Trouble concentrating 2   8.  Moving slowly or restless 2   Q9: Thoughts of better off dead/self-harm past 2 weeks 2   PHQ-9 Total Score 21   In the past two weeks have you had thoughts of suicide or self harm? No   Do you have concerns about your personal safety or the safety of others? No               Follow Up Actions Taken  Crisis resource information provided in the After Visit Summary  Referred patient back to mental health provider  Patient to follow up with PCP.  Clinic staff to schedule appointment if able.  Mental Health Referral placed    Discussed the following ways the " patient can remain in a safe environment:  dispose of old medications , arrange transportation: to [employer] in [place], to get out of house with family, and be around others    MEDICATIONS:  Continue current medications with addition of Atarax 50mg q8hrs prn and increase in Sertraline to 150mg daily.   CONSULTATION/REFERRAL to Mental health, Social Work  FUTURE APPOINTMENTS: 4 weeks in clinic  Patient Instructions   -- Make it a priority to go to [employer]  -- Reach out to palliative care with pain concerns     Return in about 4 weeks (around 10/8/2024) for Follow up, with any available provider.    Trung Moore is a 49 year old, presenting for the following health issues:  Establish Care (Back pain)      9/10/2024    12:14 PM   Additional Questions   Roomed by [facility]     Sleep concerns  She has been having trouble falling asleep and is taking quite a bit of Seroquel to fall asleep. She takes 400mg nightly as prescribed and waits to see if she can fall asleep, but then takes the next 100mg because she needs it to sleep. However, this has stopped being enough and she will take another 100mg, which is an extra, unprescribed amount, to try and sleep more. She is worried she is going to run out. Finally, she is able to fall asleep around 0600 most mornings. Because she is going to bed so late, she is waking up late and been taking her chemotherapy medications later in the day because she is waking up late. Additionally, she is very tired during the day and this is concerning for her family. Usually she is still able to take her doses of the Verzenio on time but has missed 2-3 doses because of the alterations to her sleeping schedule. She is worried that her cancer has spread more because she has missed these 2-3 doses.     In addition to this, she is having more muscles spasms and this is increasing her anxiety because she worries that it is a sign of her cancer spreading. She cites quite a bit of pain and muscle  "issues that are being managed by palliative currently. There is increased pressure and muscular discomfort in her vaginal region; she describes a muscle spasm that eventually \"eases\" up. She has never felt this before. It is intermittent in nature and this discomfort will go away on its own.     Mood  Teresa endorses significant increases in anxiety, trouble sleeping, dysphoric mood, lack of answers and lack of certainty in her bassam. In terms of her supports, she has her children and her spiritual community. She has not been able to go to [employer] in three weeks but is hoping to go this weekend. Last time she was there she felt her bassam was stronger but without going these last three weeks she has felt her bassam declining. In addition to this, she has a lot of fears in terms of going out by herself with her mounting disease burden. She is afraid something will happen to her and no one will know what is going on or who she is. This is impacting her so much that she is having dreams about this. Teresa describes a lot of fears of dying and not having her life in order nor her family's life and bills. She feels that she is leaving a large financial burden for her family.     She denies suicidal ideations or active plans of suicidality. Denies self harm. No homicidal ideation. Teresa is able to contract for safety and feels safe going home per her report.         Objective    /87 (BP Location: Right arm, Patient Position: Sitting, Cuff Size: Adult Large)   Pulse 79   Ht 1.803 m (5' 10.98\")   Wt 104.6 kg (230 lb 11.2 oz)   SpO2 97%   BMI 32.19 kg/m    Body mass index is 32.19 kg/m .  Physical Exam   GENERAL: alert and no distress  EYES: Eyes grossly normal to inspection, conjunctivae and sclerae normal  HENT: MMM  NECK: no adenopathy, no asymmetry, masses   RESP: lungs clear to auscultation - no rales, rhonchi or wheezes  CV: regular rate and rhythm, normal S1 S2, no S3 or S4, no murmur, click or rub, no " peripheral edema  ABDOMEN: soft, nontender, no hepatosplenomegaly, no masses and bowel sounds normal  MS: no gross musculoskeletal defects noted, no edema  SKIN: no suspicious lesions or rashes  NEURO: Normal strength and tone, mentation intact and speech normal  PSYCH: mentation appears normal but pt is tearful, anxious throughout encounter. Normal judgement and insight intact, appearance well groomed, and denies SI/HI, self harm. No audio or visual hallucinations, no responding to internal stimuli.           Signed Electronically by: Lavonne Frazier MD  Answers submitted by the patient for this visit:  Patient Health Questionnaire (Submitted on 9/10/2024)  If you checked off any problems, how difficult have these problems made it for you to do your work, take care of things at home, or get along with other people?: Somewhat difficult  PHQ9 TOTAL SCORE: 21

## 2024-09-11 ENCOUNTER — LAB (OUTPATIENT)
Dept: LAB | Facility: CLINIC | Age: 49
End: 2024-09-11
Payer: MEDICARE

## 2024-09-11 DIAGNOSIS — Z11.4 SCREENING FOR HIV (HUMAN IMMUNODEFICIENCY VIRUS): ICD-10-CM

## 2024-09-11 DIAGNOSIS — C50.912 CARCINOMA OF LEFT BREAST METASTATIC TO BONE (H): ICD-10-CM

## 2024-09-11 DIAGNOSIS — C50.919 MALIGNANT NEOPLASM OF FEMALE BREAST, UNSPECIFIED ESTROGEN RECEPTOR STATUS, UNSPECIFIED LATERALITY, UNSPECIFIED SITE OF BREAST (H): ICD-10-CM

## 2024-09-11 DIAGNOSIS — C50.919 BREAST CANCER (H): ICD-10-CM

## 2024-09-11 DIAGNOSIS — Z11.59 ENCOUNTER FOR SCREENING FOR OTHER VIRAL DISEASES: ICD-10-CM

## 2024-09-11 DIAGNOSIS — C79.51 CARCINOMA OF LEFT BREAST METASTATIC TO BONE (H): ICD-10-CM

## 2024-09-11 DIAGNOSIS — Z79.899 ENCOUNTER FOR LONG-TERM (CURRENT) USE OF MEDICATIONS: ICD-10-CM

## 2024-09-11 DIAGNOSIS — Z11.59 NEED FOR HEPATITIS C SCREENING TEST: ICD-10-CM

## 2024-09-11 LAB
ALBUMIN SERPL BCG-MCNC: 3.9 G/DL (ref 3.5–5.2)
ALP SERPL-CCNC: 87 U/L (ref 40–150)
ALT SERPL W P-5'-P-CCNC: 11 U/L (ref 0–50)
ANION GAP SERPL CALCULATED.3IONS-SCNC: 11 MMOL/L (ref 7–15)
AST SERPL W P-5'-P-CCNC: 17 U/L (ref 0–45)
BASOPHILS # BLD AUTO: 0 10E3/UL (ref 0–0.2)
BASOPHILS NFR BLD AUTO: 1 %
BILIRUB SERPL-MCNC: 0.3 MG/DL
BUN SERPL-MCNC: 14.8 MG/DL (ref 6–20)
CALCIUM SERPL-MCNC: 9.1 MG/DL (ref 8.8–10.4)
CANCER AG15-3 SERPL-ACNC: 57 U/ML
CEA SERPL-MCNC: 4.8 NG/ML
CHLORIDE SERPL-SCNC: 108 MMOL/L (ref 98–107)
CHOLEST SERPL-MCNC: 177 MG/DL
CREAT SERPL-MCNC: 1.13 MG/DL (ref 0.51–0.95)
EGFRCR SERPLBLD CKD-EPI 2021: 59 ML/MIN/1.73M2
EOSINOPHIL # BLD AUTO: 0.1 10E3/UL (ref 0–0.7)
EOSINOPHIL NFR BLD AUTO: 3 %
ERYTHROCYTE [DISTWIDTH] IN BLOOD BY AUTOMATED COUNT: 13.7 % (ref 10–15)
FASTING STATUS PATIENT QL REPORTED: ABNORMAL
FASTING STATUS PATIENT QL REPORTED: NORMAL
GLUCOSE SERPL-MCNC: 126 MG/DL (ref 70–99)
HBA1C MFR BLD: 6 %
HBV SURFACE AB SERPL IA-ACNC: <3.5 M[IU]/ML
HBV SURFACE AB SERPL IA-ACNC: NONREACTIVE M[IU]/ML
HCO3 SERPL-SCNC: 23 MMOL/L (ref 22–29)
HCT VFR BLD AUTO: 31.7 % (ref 35–47)
HCV AB SERPL QL IA: NONREACTIVE
HDLC SERPL-MCNC: 74 MG/DL
HGB BLD-MCNC: 10.6 G/DL (ref 11.7–15.7)
HIV 1+2 AB+HIV1 P24 AG SERPL QL IA: NONREACTIVE
IMM GRANULOCYTES # BLD: 0 10E3/UL
IMM GRANULOCYTES NFR BLD: 0 %
LDLC SERPL CALC-MCNC: 81 MG/DL
LYMPHOCYTES # BLD AUTO: 1.1 10E3/UL (ref 0.8–5.3)
LYMPHOCYTES NFR BLD AUTO: 32 %
MCH RBC QN AUTO: 35.2 PG (ref 26.5–33)
MCHC RBC AUTO-ENTMCNC: 33.4 G/DL (ref 31.5–36.5)
MCV RBC AUTO: 105 FL (ref 78–100)
MONOCYTES # BLD AUTO: 0.4 10E3/UL (ref 0–1.3)
MONOCYTES NFR BLD AUTO: 11 %
NEUTROPHILS # BLD AUTO: 1.9 10E3/UL (ref 1.6–8.3)
NEUTROPHILS NFR BLD AUTO: 53 %
NONHDLC SERPL-MCNC: 103 MG/DL
NRBC # BLD AUTO: 0 10E3/UL
NRBC BLD AUTO-RTO: 0 /100
PLATELET # BLD AUTO: 153 10E3/UL (ref 150–450)
POTASSIUM SERPL-SCNC: 3.6 MMOL/L (ref 3.4–5.3)
PROT SERPL-MCNC: 7.2 G/DL (ref 6.4–8.3)
RBC # BLD AUTO: 3.01 10E6/UL (ref 3.8–5.2)
SODIUM SERPL-SCNC: 142 MMOL/L (ref 135–145)
TRIGL SERPL-MCNC: 110 MG/DL
WBC # BLD AUTO: 3.5 10E3/UL (ref 4–11)

## 2024-09-11 PROCEDURE — 83036 HEMOGLOBIN GLYCOSYLATED A1C: CPT

## 2024-09-11 PROCEDURE — 80053 COMPREHEN METABOLIC PANEL: CPT | Performed by: PATHOLOGY

## 2024-09-11 PROCEDURE — 85025 COMPLETE CBC W/AUTO DIFF WBC: CPT | Performed by: PATHOLOGY

## 2024-09-11 PROCEDURE — 80061 LIPID PANEL: CPT | Performed by: PATHOLOGY

## 2024-09-11 PROCEDURE — 99000 SPECIMEN HANDLING OFFICE-LAB: CPT | Performed by: PATHOLOGY

## 2024-09-11 PROCEDURE — 82378 CARCINOEMBRYONIC ANTIGEN: CPT | Performed by: PHYSICIAN ASSISTANT

## 2024-09-11 PROCEDURE — 87389 HIV-1 AG W/HIV-1&-2 AB AG IA: CPT

## 2024-09-11 PROCEDURE — 86803 HEPATITIS C AB TEST: CPT

## 2024-09-11 PROCEDURE — 86706 HEP B SURFACE ANTIBODY: CPT

## 2024-09-11 PROCEDURE — 86300 IMMUNOASSAY TUMOR CA 15-3: CPT | Performed by: PHYSICIAN ASSISTANT

## 2024-09-11 PROCEDURE — 36415 COLL VENOUS BLD VENIPUNCTURE: CPT | Performed by: PATHOLOGY

## 2024-09-12 ENCOUNTER — TELEPHONE (OUTPATIENT)
Dept: ONCOLOGY | Facility: CLINIC | Age: 49
End: 2024-09-12
Payer: MEDICARE

## 2024-09-12 NOTE — ORAL ONC MGMT
Oral Chemotherapy Monitoring Program     Placed call to patient in follow up of oral chemotherapy. Left message requesting call back. No drug names were mentioned. Will update when response received.     Nadya Broderick, PharmD, [facility]  Oral Chemotherapy Monitoring Program  [facility]  [phone]

## 2024-09-13 DIAGNOSIS — Z17.0 MALIGNANT NEOPLASM OF OVERLAPPING SITES OF LEFT BREAST IN FEMALE, ESTROGEN RECEPTOR POSITIVE (H): ICD-10-CM

## 2024-09-13 DIAGNOSIS — C79.51 CARCINOMA OF LEFT BREAST METASTATIC TO BONE (H): Primary | ICD-10-CM

## 2024-09-13 DIAGNOSIS — C50.912 CARCINOMA OF LEFT BREAST METASTATIC TO BONE (H): Primary | ICD-10-CM

## 2024-09-13 DIAGNOSIS — C50.812 MALIGNANT NEOPLASM OF OVERLAPPING SITES OF LEFT BREAST IN FEMALE, ESTROGEN RECEPTOR POSITIVE (H): ICD-10-CM

## 2024-09-19 ENCOUNTER — THERAPY VISIT (OUTPATIENT)
Dept: PHYSICAL THERAPY | Facility: CLINIC | Age: 49
End: 2024-09-19
Attending: STUDENT IN AN ORGANIZED HEALTH CARE EDUCATION/TRAINING PROGRAM
Payer: MEDICARE

## 2024-09-19 DIAGNOSIS — C50.812 MALIGNANT NEOPLASM OF OVERLAPPING SITES OF LEFT BREAST IN FEMALE, ESTROGEN RECEPTOR POSITIVE (H): Primary | ICD-10-CM

## 2024-09-19 DIAGNOSIS — G89.3 CANCER ASSOCIATED PAIN: ICD-10-CM

## 2024-09-19 DIAGNOSIS — C79.51 MALIGNANT NEOPLASM METASTATIC TO BONE (H): ICD-10-CM

## 2024-09-19 DIAGNOSIS — M79.661 PAIN OF RIGHT LOWER LEG: ICD-10-CM

## 2024-09-19 DIAGNOSIS — Z17.0 MALIGNANT NEOPLASM OF OVERLAPPING SITES OF LEFT BREAST IN FEMALE, ESTROGEN RECEPTOR POSITIVE (H): Primary | ICD-10-CM

## 2024-09-19 PROCEDURE — 97110 THERAPEUTIC EXERCISES: CPT | Mod: GP | Performed by: PHYSICAL THERAPIST

## 2024-09-19 PROCEDURE — 97535 SELF CARE MNGMENT TRAINING: CPT | Mod: GP | Performed by: PHYSICAL THERAPIST

## 2024-09-24 ENCOUNTER — HOSPITAL ENCOUNTER (OUTPATIENT)
Dept: PET IMAGING | Facility: CLINIC | Age: 49
Discharge: HOME | End: 2024-09-24
Attending: PHYSICIAN ASSISTANT | Admitting: PHYSICIAN ASSISTANT
Payer: MEDICARE

## 2024-09-24 DIAGNOSIS — C50.912 CARCINOMA OF LEFT BREAST METASTATIC TO BONE (H): ICD-10-CM

## 2024-09-24 DIAGNOSIS — C79.51 CARCINOMA OF LEFT BREAST METASTATIC TO BONE (H): ICD-10-CM

## 2024-09-24 PROCEDURE — 78813 PET IMAGE FULL BODY: CPT | Mod: 26 | Performed by: RADIOLOGY

## 2024-09-24 PROCEDURE — 71250 CT THORAX DX C-: CPT

## 2024-09-24 PROCEDURE — 74176 CT ABD & PELVIS W/O CONTRAST: CPT | Mod: 26 | Performed by: RADIOLOGY

## 2024-09-24 PROCEDURE — G1010 CDSM STANSON: HCPCS | Performed by: RADIOLOGY

## 2024-09-24 PROCEDURE — 78816 PET IMAGE W/CT FULL BODY: CPT | Mod: MG,PS

## 2024-09-24 PROCEDURE — 343N000001 HC RX 343: Performed by: PHYSICIAN ASSISTANT

## 2024-09-24 PROCEDURE — A9552 F18 FDG: HCPCS | Performed by: PHYSICIAN ASSISTANT

## 2024-09-24 PROCEDURE — 71250 CT THORAX DX C-: CPT | Mod: 26 | Performed by: RADIOLOGY

## 2024-09-24 RX ORDER — FLUDEOXYGLUCOSE F 18 200 MCI/ML
10-18 INJECTION, SOLUTION INTRAVENOUS ONCE
Status: COMPLETED | OUTPATIENT
Start: 2024-09-24 | End: 2024-09-24

## 2024-09-24 RX ORDER — IOPAMIDOL 755 MG/ML
45-135 INJECTION, SOLUTION INTRAVASCULAR ONCE
Status: DISCONTINUED | OUTPATIENT
Start: 2024-09-24 | End: 2024-09-24

## 2024-09-24 RX ADMIN — FLUDEOXYGLUCOSE F 18 13.75 MILLICURIE: 200 INJECTION, SOLUTION INTRAVENOUS at 11:58

## 2024-09-25 ENCOUNTER — MYC REFILL (OUTPATIENT)
Dept: ONCOLOGY | Facility: CLINIC | Age: 49
End: 2024-09-25
Payer: MEDICARE

## 2024-09-25 DIAGNOSIS — G89.3 CANCER ASSOCIATED PAIN: ICD-10-CM

## 2024-09-25 DIAGNOSIS — C50.812 MALIGNANT NEOPLASM OF OVERLAPPING SITES OF LEFT BREAST IN FEMALE, ESTROGEN RECEPTOR POSITIVE (H): ICD-10-CM

## 2024-09-25 DIAGNOSIS — Z17.0 MALIGNANT NEOPLASM OF OVERLAPPING SITES OF LEFT BREAST IN FEMALE, ESTROGEN RECEPTOR POSITIVE (H): ICD-10-CM

## 2024-09-25 RX ORDER — OXYCODONE HYDROCHLORIDE 5 MG/1
5-10 TABLET ORAL EVERY 4 HOURS PRN
Qty: 120 TABLET | Refills: 0 | Status: SHIPPED | OUTPATIENT
Start: 2024-09-25

## 2024-09-25 NOTE — TELEPHONE ENCOUNTER
Received [employer] message from patient requesting refill of oxycodone.     Last refill: 8/20/24  Last office visit: 8/15/24  Scheduled for follow up pending, msg sent to scheduling.     Will route request to MD/ for review.     Reviewed [place]  Report.

## 2024-09-26 ENCOUNTER — THERAPY VISIT (OUTPATIENT)
Dept: PHYSICAL THERAPY | Facility: CLINIC | Age: 49
End: 2024-09-26
Attending: STUDENT IN AN ORGANIZED HEALTH CARE EDUCATION/TRAINING PROGRAM
Payer: MEDICARE

## 2024-09-26 DIAGNOSIS — M79.661 PAIN OF RIGHT LOWER LEG: ICD-10-CM

## 2024-09-26 DIAGNOSIS — Z17.0 MALIGNANT NEOPLASM OF OVERLAPPING SITES OF LEFT BREAST IN FEMALE, ESTROGEN RECEPTOR POSITIVE (H): Primary | ICD-10-CM

## 2024-09-26 DIAGNOSIS — C50.812 MALIGNANT NEOPLASM OF OVERLAPPING SITES OF LEFT BREAST IN FEMALE, ESTROGEN RECEPTOR POSITIVE (H): Primary | ICD-10-CM

## 2024-09-26 DIAGNOSIS — C79.51 MALIGNANT NEOPLASM METASTATIC TO BONE (H): ICD-10-CM

## 2024-09-26 DIAGNOSIS — G89.3 CANCER ASSOCIATED PAIN: ICD-10-CM

## 2024-09-26 PROCEDURE — 97032 APPL MODALITY 1+ESTIM EA 15: CPT | Mod: GP | Performed by: PHYSICAL THERAPIST

## 2024-09-26 PROCEDURE — 97535 SELF CARE MNGMENT TRAINING: CPT | Mod: GP | Performed by: PHYSICAL THERAPIST

## 2024-09-26 PROCEDURE — 97110 THERAPEUTIC EXERCISES: CPT | Mod: GP | Performed by: PHYSICAL THERAPIST

## 2024-09-26 PROCEDURE — 97140 MANUAL THERAPY 1/> REGIONS: CPT | Mod: GP | Performed by: PHYSICAL THERAPIST

## 2024-09-26 NOTE — PROGRESS NOTES
PLAN  Continue therapy per current plan of care. Progress slow, no falls noted during therapy. Pt with pain limiting, has had to cancel a few visits.  Goal dates extended    Beginning/End Dates of Progress Note Reporting Period:  07/25/24 to 09/26/2024    Referring Provider:  Ayanna Tellez,      09/26/24 0500   Appointment Info   Signing clinician's name / credentials Carolyn Espinoza, PT,CLT-JAHAIRA   Visits Used 6   Medical Diagnosis Malignant neoplasm of overlapping sites of left breast in female, estrogen receptor positive (H)  Cancer associated pain  Back pain with history of spinal surgery  Metastasis to bone (H)  Muscle spasm  Pain of right lower leg   Precautions/Limitations spinal mets   Progress Note/Certification   Start of Care Date 07/25/24   Onset of illness/injury or Date of Surgery 01/01/23   Therapy Frequency 1x/week   Predicted Duration 12 weeks   Certification date from 07/25/24   Certification date to 10/22/24   Progress Note Due Date 09/26/24   Progress Note Completed Date 07/25/24   PT Goal 1   Goal Identifier HEP   Goal Description Pt will be independent with HEP for L LE that does not increase her low back or L LE pain in order to be safer with her mobility both in and outside of the home   Rationale to maximize safety and independence within the home;to maximize safety and independence within the community   Goal Progress ongoing, increased pain limiting   Target Date 10/22/24   PT Goal 2   Goal Identifier Fall   Goal Description Pt will decrease TUG without device to less than 13.5s and report 2 months free of falls to prevent fracture or othehr injury   Rationale to maximize safety and independence within the home   Target Date 10/22/24   Goal Progress no falls, TUG not reassessed, so goal date extended   PT Goal 3   Goal Identifier Endurance/Activity Tolerance   Goal Description Pt will increase her 6MWT distance to 250m (greater than 10% increase) to be able to walk to the park  with her grandchildren without fear of not making it or needing to sit.   Rationale to maximize safety and independence with transportation   Target Date 10/22/24   Goal Progress Unable to complete testing in last few sessions   PT Goal 4   Goal Identifier Fatigue   Goal Description Pt will use strategies of pacing, exercise, sleep habits and good nutrition to increase her facit score by 10 points or more to at least 27   Rationale to maximize safety and independence within the community   Goal Progress 15, -2 points from eval, pain more   Target Date 10/22/24   Subjective Report   Subjective Report Had CT and PET but not understanding the results. Back pain today causing her to wake up early, take medications and then got back to bed. She is a little late due to overlseeping.   Objective Measure 1   Objective Measure Pain   Details Pelvic pain not different, back hurting.   Objective Measure 2   Objective Measure Activity   Details Walked to bus Monday and Wednesday.   Electrical Stimulation   Electrical Stimulation (Attended) Minutes (08997) 20   Treatment Detail Pt educated on gate theory of pain, how TENS works, cautious trial today with pt educated on the lack of recommendations for TENS with cancer. Trial could be outside of areas of mets. Pt felt okay with trying to where she has the pain today.  Following session, as pt did feel benefit, PT to reach out to palliative care DO for her thoughts on patient getting home TENS and will not use again in clinic until recommendations provided.   Electrical Stim -Type TENS   Patient Response/Progress Pt felt the TENS was nice distraction, she tried different amplitude finding 20 was strong enought to feel without getting muscle twitch or discomfort on skin   Intensity up to 20 for most of session   Duration 15 min   Frequency/Pattern Premod  (modulating program)   Location low back   Positioning sitting   Electrode placement diagonal 2 channel   Therapeutic  Procedure/Exercise   Therapeutic Procedures: strength, endurance, ROM, flexibility minutes (81801) 12   Ther Proc 1 HEP   Ther Proc 1 - Details Walking causes pressure in her back and kidneys. Pt has not gotten walker, wants one. Will try to walk to bus stop today to meet grandkids. Gentle L LE stretchign completed today and AROM with heelslides, hip abduction, glute sets (bridge causing increasd pain).   Skilled Intervention exercise as tolerated,   Patient Response/Progress trying to do as much as she can   Manual Therapy   Manual Therapy 1 Pain managmeent   Manual Therapy 1 - Details Very gentle lumbar distraction performed with caution due to mets but to decrease back pain. Pt reports static traction reduces pain but slight oscillation feels even better. Pt educated on how to get into a position at home that will put the spine in slight distraction. She reports that massage can help. Pt reminded to keep the massage light to superficial skin and muscles.  Pt to avoid deep sustained heat to these areas as well.   Manual Therapy: Mobilization, MFR, MLD, friction massage minutes (48202) 12   Patient Response/Progress distraction helpful, also lying down is better than compression of gravity, pt to change position freuquently at home   Skilled Intervention manual therapy to address impairment with caution due to medical condition   Self Care/home Management   ADL/Home Mgmt Training (52222) 12   Self Care 2 Pain management   Self Care 2 - Details Trial today with abdominal binder. Pt feels comfort in the binder. She has just trialed TENS but hug of binder feels supportive. Pt was up and walking with walker about 150' and continues to have the severe pain when up. not sure if the binder helps but initially seemed to have some affect when standing and open to trial.  Pt educated on importance of movement as pain will not get better without movement but body will get weaker. Pain currently not improving with movement but  pt does feel that if she loosens up after sleeping all night she has some better hours when meds are on board.She was without oxycodone for a few days. reminded to call in when she is down to 2x the amount of pills that she usually takes in 24 hour period to get refill before out. Overall today is harder day.   Skilled Intervention education   Patient Response/Progress understanding, likes binder   Education   Learner/Method Patient   Education Comments pain management   Plan   Home program ask about walker, keep trying to move   Updates to plan of care schedule 4 more   Plan for next session what did DO say about TENS, try 6MWT with walker, TUG and other goal areas   Total Session Time   Timed Code Treatment Minutes 56   Total Treatment Time (sum of timed and untimed services) 56

## 2024-09-30 RX ORDER — ZOLEDRONIC ACID 0.04 MG/ML
4 INJECTION, SOLUTION INTRAVENOUS ONCE
OUTPATIENT
Start: 2024-12-03 | End: 2024-12-03

## 2024-09-30 RX ORDER — HEPARIN SODIUM (PORCINE) LOCK FLUSH IV SOLN 100 UNIT/ML 100 UNIT/ML
5 SOLUTION INTRAVENOUS
OUTPATIENT
Start: 2024-12-03

## 2024-09-30 RX ORDER — HEPARIN SODIUM,PORCINE 10 UNIT/ML
5 VIAL (ML) INTRAVENOUS
OUTPATIENT
Start: 2024-12-03

## 2024-10-01 ENCOUNTER — APPOINTMENT (OUTPATIENT)
Dept: LAB | Facility: CLINIC | Age: 49
End: 2024-10-01
Attending: INTERNAL MEDICINE
Payer: MEDICARE

## 2024-10-01 ENCOUNTER — ONCOLOGY VISIT (OUTPATIENT)
Dept: ONCOLOGY | Facility: CLINIC | Age: 49
End: 2024-10-01
Attending: INTERNAL MEDICINE
Payer: MEDICARE

## 2024-10-01 VITALS
SYSTOLIC BLOOD PRESSURE: 123 MMHG | TEMPERATURE: 97.6 F | WEIGHT: 236.2 LBS | OXYGEN SATURATION: 96 % | DIASTOLIC BLOOD PRESSURE: 80 MMHG | RESPIRATION RATE: 16 BRPM | BODY MASS INDEX: 32.96 KG/M2 | HEART RATE: 82 BPM

## 2024-10-01 DIAGNOSIS — Z17.0 MALIGNANT NEOPLASM OF OVERLAPPING SITES OF LEFT BREAST IN FEMALE, ESTROGEN RECEPTOR POSITIVE (H): ICD-10-CM

## 2024-10-01 DIAGNOSIS — M79.605 PAIN AND SWELLING OF LEFT LOWER EXTREMITY: ICD-10-CM

## 2024-10-01 DIAGNOSIS — M54.42 CHRONIC MIDLINE LOW BACK PAIN WITH LEFT-SIDED SCIATICA: ICD-10-CM

## 2024-10-01 DIAGNOSIS — M79.89 PAIN AND SWELLING OF LEFT LOWER EXTREMITY: ICD-10-CM

## 2024-10-01 DIAGNOSIS — G89.29 CHRONIC MIDLINE LOW BACK PAIN WITH LEFT-SIDED SCIATICA: ICD-10-CM

## 2024-10-01 DIAGNOSIS — M79.662 PAIN OF LEFT CALF: ICD-10-CM

## 2024-10-01 DIAGNOSIS — R94.8 ABNORMAL GASTROINTESTINAL PET SCAN: ICD-10-CM

## 2024-10-01 DIAGNOSIS — C50.912 CARCINOMA OF LEFT BREAST METASTATIC TO BONE (H): Primary | ICD-10-CM

## 2024-10-01 DIAGNOSIS — C50.812 MALIGNANT NEOPLASM OF OVERLAPPING SITES OF LEFT BREAST IN FEMALE, ESTROGEN RECEPTOR POSITIVE (H): ICD-10-CM

## 2024-10-01 DIAGNOSIS — C79.51 CARCINOMA OF LEFT BREAST METASTATIC TO BONE (H): Primary | ICD-10-CM

## 2024-10-01 DIAGNOSIS — D84.9 IMMUNOCOMPROMISED (H): ICD-10-CM

## 2024-10-01 LAB
ALBUMIN SERPL BCG-MCNC: 4.1 G/DL (ref 3.5–5.2)
ALP SERPL-CCNC: 92 U/L (ref 40–150)
ALT SERPL W P-5'-P-CCNC: 13 U/L (ref 0–50)
ANION GAP SERPL CALCULATED.3IONS-SCNC: 10 MMOL/L (ref 7–15)
AST SERPL W P-5'-P-CCNC: 17 U/L (ref 0–45)
BASOPHILS # BLD AUTO: 0 10E3/UL (ref 0–0.2)
BASOPHILS NFR BLD AUTO: 1 %
BILIRUB SERPL-MCNC: 0.3 MG/DL
BUN SERPL-MCNC: 10.9 MG/DL (ref 6–20)
CALCIUM SERPL-MCNC: 9.6 MG/DL (ref 8.8–10.4)
CANCER AG15-3 SERPL-ACNC: 56 U/ML
CEA SERPL-MCNC: 4.1 NG/ML
CHLORIDE SERPL-SCNC: 104 MMOL/L (ref 98–107)
CREAT SERPL-MCNC: 1.1 MG/DL (ref 0.51–0.95)
EGFRCR SERPLBLD CKD-EPI 2021: 61 ML/MIN/1.73M2
EOSINOPHIL # BLD AUTO: 0.1 10E3/UL (ref 0–0.7)
EOSINOPHIL NFR BLD AUTO: 3 %
ERYTHROCYTE [DISTWIDTH] IN BLOOD BY AUTOMATED COUNT: 13.9 % (ref 10–15)
GLUCOSE SERPL-MCNC: 126 MG/DL (ref 70–99)
HCO3 SERPL-SCNC: 26 MMOL/L (ref 22–29)
HCT VFR BLD AUTO: 32.8 % (ref 35–47)
HGB BLD-MCNC: 10.9 G/DL (ref 11.7–15.7)
IMM GRANULOCYTES # BLD: 0 10E3/UL
IMM GRANULOCYTES NFR BLD: 0 %
LYMPHOCYTES # BLD AUTO: 1.1 10E3/UL (ref 0.8–5.3)
LYMPHOCYTES NFR BLD AUTO: 33 %
MCH RBC QN AUTO: 34.9 PG (ref 26.5–33)
MCHC RBC AUTO-ENTMCNC: 33.2 G/DL (ref 31.5–36.5)
MCV RBC AUTO: 105 FL (ref 78–100)
MONOCYTES # BLD AUTO: 0.3 10E3/UL (ref 0–1.3)
MONOCYTES NFR BLD AUTO: 9 %
NEUTROPHILS # BLD AUTO: 1.8 10E3/UL (ref 1.6–8.3)
NEUTROPHILS NFR BLD AUTO: 55 %
NRBC # BLD AUTO: 0 10E3/UL
NRBC BLD AUTO-RTO: 0 /100
PLATELET # BLD AUTO: 178 10E3/UL (ref 150–450)
POTASSIUM SERPL-SCNC: 4.1 MMOL/L (ref 3.4–5.3)
PROT SERPL-MCNC: 7.6 G/DL (ref 6.4–8.3)
RBC # BLD AUTO: 3.12 10E6/UL (ref 3.8–5.2)
SODIUM SERPL-SCNC: 140 MMOL/L (ref 135–145)
WBC # BLD AUTO: 3.3 10E3/UL (ref 4–11)

## 2024-10-01 PROCEDURE — 90480 ADMN SARSCOV2 VAC 1/ONLY CMP: CPT | Performed by: INTERNAL MEDICINE

## 2024-10-01 PROCEDURE — 96402 CHEMO HORMON ANTINEOPL SQ/IM: CPT | Performed by: INTERNAL MEDICINE

## 2024-10-01 PROCEDURE — 99215 OFFICE O/P EST HI 40 MIN: CPT | Performed by: INTERNAL MEDICINE

## 2024-10-01 PROCEDURE — 91320 SARSCV2 VAC 30MCG TRS-SUC IM: CPT | Performed by: INTERNAL MEDICINE

## 2024-10-01 PROCEDURE — 85004 AUTOMATED DIFF WBC COUNT: CPT | Performed by: INTERNAL MEDICINE

## 2024-10-01 PROCEDURE — 80053 COMPREHEN METABOLIC PANEL: CPT | Performed by: INTERNAL MEDICINE

## 2024-10-01 PROCEDURE — 250N000011 HC RX IP 250 OP 636: Performed by: INTERNAL MEDICINE

## 2024-10-01 PROCEDURE — 36415 COLL VENOUS BLD VENIPUNCTURE: CPT | Performed by: INTERNAL MEDICINE

## 2024-10-01 PROCEDURE — G0463 HOSPITAL OUTPT CLINIC VISIT: HCPCS | Mod: 25 | Performed by: INTERNAL MEDICINE

## 2024-10-01 PROCEDURE — G2211 COMPLEX E/M VISIT ADD ON: HCPCS | Performed by: INTERNAL MEDICINE

## 2024-10-01 PROCEDURE — 99417 PROLNG OP E/M EACH 15 MIN: CPT | Performed by: INTERNAL MEDICINE

## 2024-10-01 PROCEDURE — 82378 CARCINOEMBRYONIC ANTIGEN: CPT | Performed by: INTERNAL MEDICINE

## 2024-10-01 PROCEDURE — 250N000011 HC RX IP 250 OP 636: Mod: JZ | Performed by: PHYSICIAN ASSISTANT

## 2024-10-01 PROCEDURE — 86300 IMMUNOASSAY TUMOR CA 15-3: CPT | Performed by: INTERNAL MEDICINE

## 2024-10-01 RX ORDER — LAMOTRIGINE 25 MG/1
500 TABLET ORAL ONCE
Status: COMPLETED | OUTPATIENT
Start: 2024-10-01 | End: 2024-10-01

## 2024-10-01 RX ORDER — EPINEPHRINE 1 MG/ML
0.3 INJECTION, SOLUTION INTRAMUSCULAR; SUBCUTANEOUS EVERY 5 MIN PRN
Status: DISCONTINUED | OUTPATIENT
Start: 2024-10-01 | End: 2024-10-03 | Stop reason: HOSPADM

## 2024-10-01 RX ORDER — DIPHENHYDRAMINE HCL 25 MG
50 CAPSULE ORAL
Status: DISCONTINUED | OUTPATIENT
Start: 2024-10-01 | End: 2024-10-03 | Stop reason: HOSPADM

## 2024-10-01 RX ORDER — DIPHENHYDRAMINE HYDROCHLORIDE 50 MG/ML
50 INJECTION INTRAMUSCULAR; INTRAVENOUS
Status: DISCONTINUED | OUTPATIENT
Start: 2024-10-01 | End: 2024-10-03 | Stop reason: HOSPADM

## 2024-10-01 RX ADMIN — COVID-19 VACCINE, MRNA 30 MCG: 0.04 INJECTION, SUSPENSION INTRAMUSCULAR at 13:24

## 2024-10-01 RX ADMIN — FULVESTRANT 500 MG: 50 INJECTION, SOLUTION INTRAMUSCULAR at 13:20

## 2024-10-01 ASSESSMENT — PAIN SCALES - GENERAL: PAINLEVEL: SEVERE PAIN (7)

## 2024-10-01 NOTE — PROGRESS NOTES
Oncology Visit:   Date on this visit: Oct 1, 2024    Diagnosis:  ER positive left breast cancer metastasized to bones.     Primary Physician: No Ref-Primary, Physician     History Of Present Illness:    Ms. Sanderson is a 49 year old female with a h/o tobacco abuse and DVTs with left breast cancer metastasized to bone. She presented to [place] ED with back pain on 12/5/2020. MRI of the L-spine showed an abnormal L4 lesion with associated right paraspinal mass, abnormalities in L5 and the left iliac bone were also seen. CT C/A/P showed a left breast mass, lytic lesions of T7, L4, and the pelvis, and a 3 cm lesion in the kidney (thought to be a cyst). Ultrasound of the bilateral lower extremities showed a non-occlusive thrombus in the left popliteal vein. Mammogram and ultrasound of the bilateral breasts on 12/17/2020 showed a spiculated mass measuring at least 7.8 cm at 12-1:00 left breast extending from the nipple to 9 cm from the nipple with associated nipple retraction. This mass was biopsied, and showed IDC with surrounding DCIS, grade 3, ER+ 90%, and IL+ 75%.  HER2 was equivocal in approximately 35% of tumor cells by FISH and was negative by IHC.    Metastatic Breast Cancer Treatment:  12/23/2021 - 1/7/2021  Radiation (3000 cGy) to the lumbar spine.  1/29/2021 - present  Ibrance, zoladex, and anastrozole.  5/17/2021 radiofrequency ablation, kyphoplasty to L4  8/20/2021  Bilateral salpingo-oophorectomies, Ibrance and anastrozole.  She was off anastrozole 12/2021 - 2/2022 and off Ibrance 01/2022 - 02/2022 due to stress and impending homelessness. Off both again 03/2022-04/2022 due to death of her son but restarted in 05/2022.  04/2023  PET/CT with progression in 2 small metastases in the left pelvis.  Palbociclib continued.  Anastrozole changed to exemestane  5/5/2023  Completed radiation, 2000 cGy in 5 fractions, to the left ilium.  12/19/2023 Left breast biopsy  [facility] NGS:   ER positive, 3+  100%   IL positive,  "1+, 5%   HER2 negative.   AR positive, 1+, 35%   MMR proficient   PD-L1 negative, CPS 0   PTEN positive, 1+ 100%  *Of note, all of the above was IHC, DNA quantity not sufficient for NGS  1/18/24 - present  Fulvestrant + abemaciclib.  Abemaciclib dose reduced to 100 mg PO BID due to hand foot syndrome.  7/18/2024  completed radiation to the left acetabulum and the SI    Interval History:   Teresa Sanderson comes into clinic today, alongside her daughter, for metastatic breast cancer follow up.  She continues on treatment with abemaciclib and fulvestrant.  Since our last visit, she completed radiation to the left acetabulum and sacroiliac regions.  She notes that for the past 4-6 weeks she has had worsening of left low back pain and pain down the posterior left leg.  She is doing physical therapy and states this makes the pain worse.  She also notes that her physical therapist noted that her left leg is larger than the right.  She also has focal tenderness in the posterior left calf.  She continues on Xarelto, but admittedly intermittently misses a dose.  She denies having missed consecutive doses.  She denies other new areas of pain.  She is no longer using buprenorphine patches, as these would \"fall off\".  She is now taking oxycodone 6-8 tabs total per day (2 tabs in the morning, 2 around 4:00 in the afternoon, 2 at bedtime, and sometimes 2 between 2:00 and 4:00 a.m. if she wakes at night).  She denies numbness in the left lower extremity, but does feel like sometimes it feels like it is going to give out from under her.    She otherwise has been well.  She has no fevers, chills, cough, shortness of breath or chest pain.  She has not had diarrhea since dose reduction of abemaciclib.  She denies nausea.  She notes some occasional gassiness/belching.  She has acid reflux only after eating certain foods, not consistently.  She sometimes will have epigastric discomfort.    Past Medical/Surgical History:   Past Medical " History:   Diagnosis Date    Anxiety     Breast CA (H) 12/2020    Depression     DVT (deep venous thrombosis) (H) 2014    Left breast mass     x approximately 4-5 months    Pulmonary emboli (H)     Pyelonephritis     Schizoaffective disorder (H)     Tobacco use      Past Surgical History:   Procedure Laterality Date    COLONOSCOPY N/A 7/8/2022    Procedure: COLONOSCOPY, WITH POLYPECTOMY;  Surgeon: Ham Cano MD;  Location: [facility] OR    IR LUMBAR KYPHOPLASTY VERTEBRAE  5/17/2021    LAPAROSCOPIC SALPINGO-OOPHORECTOMY Bilateral 8/20/2021    Procedure: BILATERAL SALPINGO-OOPHORECTOMY, LAPAROSCOPIC;  Surgeon: Rory Lopez MD;  Location: [facility] OR    TUBAL LIGATION  1998        Allergies   Allergen Reactions    Contrast Dye      Pt developed nausea after isovue 370 injection on 6/9/21        Current Outpatient Medications   Medication Sig Dispense Refill    [START ON 10/1/2024] abemaciclib (VERZENIO) 100 MG tablet Take 1 tablet (100 mg) by mouth 2 times daily for 28 days 56 tablet 0    gabapentin (NEURONTIN) 300 MG capsule Take 2 capsules in the morning, 2 capsules in afternoon, and 3 capsules at bedtime. 210 capsule 1    hydrOXYzine HCl (ATARAX) 25 MG tablet Take 2 tablets (50 mg) by mouth every 8 hours as needed for anxiety. 90 tablet 0    methocarbamol (ROBAXIN) 500 MG tablet Take 2-3 tablets (1,000-1,500 mg) by mouth 3 times daily as needed for muscle spasms 210 tablet 2    methylPREDNISolone (MEDROL) 32 MG tablet 12 hours prior to scan and 2 hours prior to scan 2 tablet 3    naloxone (NARCAN) 4 MG/0.1ML nasal spray Spray 1 spray (4 mg) into one nostril alternating nostrils once as needed for opioid reversal every 2-3 minutes until assistance arrives (Patient not taking: Reported on 4/8/2024) 0.2 mL 0    oxyCODONE (ROXICODONE) 5 MG tablet Take 1-2 tablets (5-10 mg) by mouth every 4 hours as needed for pain. 120 tablet 0    QUEtiapine (SEROQUEL) 400 MG tablet Take 1 tablet (400 mg) by mouth at bedtime 30  tablet 2    QUEtiapine (SEROQUEL) 50 MG tablet Take 0.5-1 tablets (25-50 mg) by mouth 2 times daily as needed (for sleep or acute agitation) 60 tablet 2    rivaroxaban ANTICOAGULANT (XARELTO) 20 MG TABS tablet Take 1 tablet (20 mg) by mouth daily (with dinner) 90 tablet 3    sertraline (ZOLOFT) 100 MG tablet Take 1.5 tablets (150 mg) by mouth daily. 135 tablet 0    Vitamin D3 (CHOLECALCIFEROL) 25 mcg (1000 units) tablet Take 1 tablet (25 mcg) by mouth daily 90 tablet 3     No current facility-administered medications for this visit.   '  Physical Exam:   /80 (BP Location: Right arm, Patient Position: Sitting, Cuff Size: Adult Large)   Pulse 82   Temp 97.6  F (36.4  C) (Oral)   Resp 16   Wt 107.1 kg (236 lb 3.2 oz)   SpO2 96%   BMI 32.96 kg/m    General:  Well appearing adult female in NAD.  Alert and oriented x 3.  HEENT:  Normocephalic.  Sclera anicteric. MMM.    Lymph:  No palpable cervical, supraclavicular, or axillary LAD.   Chest:  CTA bilaterally.  No wheezes or crackles.  CV:  RRR.  No audible m/r/g.  Abd:  Soft/ND/NT.    Ext:  Left lower extremity appears slightly larger than the right.  There is focal tenderness to palpation of the posterior calf in the area just below the knee.  Neuro:  Cranial nerves grossly intact. Gait stable.  No tremor or dyskinetic movements.  Psych:  Mood and affect appear normal.    Skin: No visible concerning skin rashes or lesions.    Laboratory/Imaging Studies:   I personally reviewed the below laboratories and images while in clinic today:    9/24/2024 PET/CT:  FINDINGS:      HEAD/NECK:  There is no suspicious FDG uptake in the neck.     The paranasal sinuses are clear. The mastoid air cells are clear.      No hypermetabolic lymph nodes are demonstrated in the neck.      The mucosal and deep spaces of the neck are unremarkable. The major  salivary glands are unremarkable. Stable right thyroid nodule.  Redemonstration of patchy FDG uptake in the left thyroid. The  major  vasculature of the neck are patent.     CHEST:  Stable 1.6 cm mass in the left breast with max SUV of 3.5, previously  measured the same with max SUV of 4.5. Previously seen smaller mass in  the medial left breast no longer has increased uptake.     Respiratory motion artifact limits evaluation of the lung parenchyma.  Bilateral dependent and bibasilar atelectasis. Tiny nodule within the  left upper lobe is stable, but indeterminate due to size.  The central tracheobronchial tree is clear. No pleural effusion or  pneumothorax. No acute consolidation.      No hypermetabolic lymph nodes are demonstrated in the chest.     Heart size is within normal limits. No pericardial effusion. The  thoracic aorta and main pulmonary artery are within normal limits for  diameter. The esophagus is unremarkable.      ABDOMEN AND PELVIS:  There is no suspicious FDG uptake in the abdomen or pelvis.  Evaluation of the solid abdominal and pelvic organs is limited due to  lack of IV contrast.     The liver is unremarkable. The gallbladder is unremarkable. No  intrahepatic or extrahepatic biliary ductal dilatation. The pancreas  is unremarkable. No pancreatic ductal dilatation. The spleen is  unremarkable. The adrenal glands are unremarkable.     Symmetric enhancement of the kidneys. No hydronephrosis. Incompletely  characterized cystic lesion within the right kidney. The urinary  bladder is unremarkable. The reproductive organs are unremarkable.     No hypermetabolic lymph nodes are demonstrated in the abdomen or  pelvis.     Normal caliber of the small and large bowel. Diffuse increased avidity  throughout the stomach, increased compared to prior exam     No free air, free fluid, or fluid collection. Normal caliber of the  abdominal aorta.     BONES:   -Stable size of posterior left iliac bone lytic lesion measuring 1.0  cm with decreased FDG avidity, SUV max today 4.32, previously 8.7.  -Stable size and decreased FDG avidity of  left acetabular focus, now  with SUV max of 7.3, previously 13.5.  -Decrease in FDG avidity of the T7 vertebral body sclerotic lesion.   -Stable FDG avidity of the T8 vertebral body without associated lytic  or sclerotic lesion on CT.  -Stable L4 vertebroplasty with cement extravasation into the IVC.  -Stable, likely degenerative uptake in the left C3-4 articular facet.  Degenerative uptake in the bilateral acromioclavicular joints.                                                                     IMPRESSION:   1.  Findings consistent with metabolic response to therapy, with  stable size and decreased metabolic activity of the left breast masses  and.decreased metabolic activity of the left iliac, left acetabular,  and T7 metastases.  2.  Stable FDG uptake in the T8 vertebral body without CT correlate.  3.  Stable avidity within the left lobe of the thyroid, nonspecific.  Recommend further evaluation with thyroid ultrasound previously  performed.  4.  Diffuse intensity throughout the stomach, which can be seen as  present. The lesion.  5.  Incidental CT findings, as above    10/1/2024 Labs:  Electrolytes are wnl.  Creatinine is elevated at 1.10 mg/dL  eGFR is low at 61 mL/min  Liver function tests are wnl.    WBC is low at 3.3; ANC is wnl at 1.8  Hemoglobin is low at 10.9 g/dL  Platelets are wnl.    ASSESSMENT/PLAN:   48 year old female with history of DVT and ER positive left breast cancer metastasized to bones.    1.  Metastatic breast cancer: She has been on her most recent treatment with abemaciclib and fulvestrant since 01/2024.    - PET/CT shows a good response to treatment with decrease in uptake in left breast mass as well as in known osseous metastases.  - Plan to continue treatment with Abemaciclib and fulvestrant at this time.  Abemaciclib dose reduced to 100 mg PO BID secondary to hand foot syndrome.  Symptoms improved with the dose reduction.  - Will receive fulvestrant today.  - Recommend tissue  biopsy vs Guardant 360 liquid NGS at time of progression to help guide next treatment (capivasertib vs everolimus vs elacestrant)    2.  Bone metastases/low back pain with LLE sciatica:  She is s/p XRT to the left ilium as well as the lumbar spine and kyphoplasty of L4--with some extravasation of cement which procedularist is aware of and recommended continued AC indefinitely.   - most recently received radiation to the left acetabulum and the SI, completed on 7/18/2024.  - She is currently taking oxycodone 2 tabs in the a.m. 2 in the afternoon, 2 before bed, and sometimes 2 in the early a.m.  She also takes Robaxin, and gabapentin.  Ongoing follow up with palliative medicine.  - interventional pain clinic referral placed to determine if patient may benefit from a pain relieving procedure.  - Receiving Zometa once every 3 months. Next is due around 12/3/2024.    3.  Schizoaffective disorder, bipolar type: No change with last visit.  She is on treatment with Seroquel and Zoloft. Ongoing follow up with psychiatry.     4.  LLE edema and calf pain/h/o DVT:  New left lower extremity swelling and pain in the last couple of weeks concerning for recurrent/progressive DVT.  She admits missing doses of Xarelto.   - Venous ultrasound left lower extremity.    5.  FDG uptake stomach:  I discussed this with Dr. Strickland, radiologist who read her PET/CT.  The uptake is chronic, but more pronounced on this most recent scan.  It is especially prominent in the fundus of the stomach.   - GI  referral placed for diagnostic EGD  - in the meantime, I recommend she take omeprazole 20 mg PO daily.    6.  FELTON:  Secondary to abemaciclib.  Continue to avoid NSAIDs and encouraged good hydration.     7.  Immunocompromised patient:  Secondary to abemaciclib.  Reviewed infectious precautions.  She is open to receiving COVID vaccine today.  She received influenza vaccine at the time of her previous visit.    Follow Up:  Ultrasound venous  left lower extremity  GI  referral placed for endoscopy  Interventional pain referral placed for low back pain with LLE sciatica.  Labs and fulvestrant every 4 weeks x 4.  Labs and Zometa around 12/3/2024  Visit with Alee in 2 months.  Visit with me in 4 months.    PET/CT 1-2 business days prior to return visit with me.    The longitudinal plan of care for the diagnosis(es)/condition(s) as documented were addressed during this visit. Due to the added complexity in care, I will continue to support Teresa in the subsequent management and with ongoing continuity of care.    I spent 60 minutes on the date of the encounter doing chart review, review of test results, interpretation of tests, patient visit, documentation, and discussion with other provider(s).

## 2024-10-01 NOTE — LETTER
10/1/2024      Teresa Sanderson  [address]  [facility] [address]      Dear Colleague,    Thank you for referring your patient, Teresa Sanderson, to the [facility]. Please see a copy of my visit note below.    Oncology Visit:   Date on this visit: Oct 1, 2024    Diagnosis:  ER positive left breast cancer metastasized to bones.     Primary Physician: No Ref-Primary, Physician     History Of Present Illness:    Ms. Sanderson is a 49 year old female with a h/o tobacco abuse and DVTs with left breast cancer metastasized to bone. She presented to [place] ED with back pain on 12/5/2020. MRI of the L-spine showed an abnormal L4 lesion with associated right paraspinal mass, abnormalities in L5 and the left iliac bone were also seen. CT C/A/P showed a left breast mass, lytic lesions of T7, L4, and the pelvis, and a 3 cm lesion in the kidney (thought to be a cyst). Ultrasound of the bilateral lower extremities showed a non-occlusive thrombus in the left popliteal vein. Mammogram and ultrasound of the bilateral breasts on 12/17/2020 showed a spiculated mass measuring at least 7.8 cm at 12-1:00 left breast extending from the nipple to 9 cm from the nipple with associated nipple retraction. This mass was biopsied, and showed IDC with surrounding DCIS, grade 3, ER+ 90%, and KY+ 75%.  HER2 was equivocal in approximately 35% of tumor cells by FISH and was negative by IHC.    Metastatic Breast Cancer Treatment:  12/23/2021 - 1/7/2021  Radiation (3000 cGy) to the lumbar spine.  1/29/2021 - present  Ibrance, zoladex, and anastrozole.  5/17/2021 radiofrequency ablation, kyphoplasty to L4  8/20/2021  Bilateral salpingo-oophorectomies, Ibrance and anastrozole.  She was off anastrozole 12/2021 - 2/2022 and off Ibrance 01/2022 - 02/2022 due to stress and impending homelessness. Off both again 03/2022-04/2022 due to death of her son but restarted in 05/2022.  04/2023  PET/CT with  "progression in 2 small metastases in the left pelvis.  Palbociclib continued.  Anastrozole changed to exemestane  5/5/2023  Completed radiation, 2000 cGy in 5 fractions, to the left ilium.  12/19/2023 Left breast biopsy  [facility] NGS:   ER positive, 3+  100%   ME positive, 1+, 5%   HER2 negative.   AR positive, 1+, 35%   MMR proficient   PD-L1 negative, CPS 0   PTEN positive, 1+ 100%  *Of note, all of the above was IHC, DNA quantity not sufficient for NGS  1/18/24 - present  Fulvestrant + abemaciclib.  Abemaciclib dose reduced to 100 mg PO BID due to hand foot syndrome.  7/18/2024  completed radiation to the left acetabulum and the SI    Interval History:   Teresa Sanderson comes into clinic today, alongside her daughter, for metastatic breast cancer follow up.  She continues on treatment with abemaciclib and fulvestrant.  Since our last visit, she completed radiation to the left acetabulum and sacroiliac regions.  She notes that for the past 4-6 weeks she has had worsening of left low back pain and pain down the posterior left leg.  She is doing physical therapy and states this makes the pain worse.  She also notes that her physical therapist noted that her left leg is larger than the right.  She also has focal tenderness in the posterior left calf.  She continues on Xarelto, but admittedly intermittently misses a dose.  She denies having missed consecutive doses.  She denies other new areas of pain.  She is no longer using buprenorphine patches, as these would \"fall off\".  She is now taking oxycodone 6-8 tabs total per day (2 tabs in the morning, 2 around 4:00 in the afternoon, 2 at bedtime, and sometimes 2 between 2:00 and 4:00 a.m. if she wakes at night).  She denies numbness in the left lower extremity, but does feel like sometimes it feels like it is going to give out from under her.    She otherwise has been well.  She has no fevers, chills, cough, shortness of breath or chest pain.  She has not had diarrhea " since dose reduction of abemaciclib.  She denies nausea.  She notes some occasional gassiness/belching.  She has acid reflux only after eating certain foods, not consistently.  She sometimes will have epigastric discomfort.    Past Medical/Surgical History:   Past Medical History:   Diagnosis Date     Anxiety      Breast CA (H) 12/2020     Depression      DVT (deep venous thrombosis) (H) 2014     Left breast mass     x approximately 4-5 months     Pulmonary emboli (H)      Pyelonephritis      Schizoaffective disorder (H)      Tobacco use      Past Surgical History:   Procedure Laterality Date     COLONOSCOPY N/A 7/8/2022    Procedure: COLONOSCOPY, WITH POLYPECTOMY;  Surgeon: Ham Cano MD;  Location: [facility] OR     IR LUMBAR KYPHOPLASTY VERTEBRAE  5/17/2021     LAPAROSCOPIC SALPINGO-OOPHORECTOMY Bilateral 8/20/2021    Procedure: BILATERAL SALPINGO-OOPHORECTOMY, LAPAROSCOPIC;  Surgeon: Rory Lopez MD;  Location: [facility] OR     TUBAL LIGATION  1998        Allergies   Allergen Reactions     Contrast Dye      Pt developed nausea after isovue 370 injection on 6/9/21        Current Outpatient Medications   Medication Sig Dispense Refill     [START ON 10/1/2024] abemaciclib (VERZENIO) 100 MG tablet Take 1 tablet (100 mg) by mouth 2 times daily for 28 days 56 tablet 0     gabapentin (NEURONTIN) 300 MG capsule Take 2 capsules in the morning, 2 capsules in afternoon, and 3 capsules at bedtime. 210 capsule 1     hydrOXYzine HCl (ATARAX) 25 MG tablet Take 2 tablets (50 mg) by mouth every 8 hours as needed for anxiety. 90 tablet 0     methocarbamol (ROBAXIN) 500 MG tablet Take 2-3 tablets (1,000-1,500 mg) by mouth 3 times daily as needed for muscle spasms 210 tablet 2     methylPREDNISolone (MEDROL) 32 MG tablet 12 hours prior to scan and 2 hours prior to scan 2 tablet 3     naloxone (NARCAN) 4 MG/0.1ML nasal spray Spray 1 spray (4 mg) into one nostril alternating nostrils once as needed for opioid reversal every 2-3  minutes until assistance arrives (Patient not taking: Reported on 4/8/2024) 0.2 mL 0     oxyCODONE (ROXICODONE) 5 MG tablet Take 1-2 tablets (5-10 mg) by mouth every 4 hours as needed for pain. 120 tablet 0     QUEtiapine (SEROQUEL) 400 MG tablet Take 1 tablet (400 mg) by mouth at bedtime 30 tablet 2     QUEtiapine (SEROQUEL) 50 MG tablet Take 0.5-1 tablets (25-50 mg) by mouth 2 times daily as needed (for sleep or acute agitation) 60 tablet 2     rivaroxaban ANTICOAGULANT (XARELTO) 20 MG TABS tablet Take 1 tablet (20 mg) by mouth daily (with dinner) 90 tablet 3     sertraline (ZOLOFT) 100 MG tablet Take 1.5 tablets (150 mg) by mouth daily. 135 tablet 0     Vitamin D3 (CHOLECALCIFEROL) 25 mcg (1000 units) tablet Take 1 tablet (25 mcg) by mouth daily 90 tablet 3     No current facility-administered medications for this visit.   '  Physical Exam:   /80 (BP Location: Right arm, Patient Position: Sitting, Cuff Size: Adult Large)   Pulse 82   Temp 97.6  F (36.4  C) (Oral)   Resp 16   Wt 107.1 kg (236 lb 3.2 oz)   SpO2 96%   BMI 32.96 kg/m    General:  Well appearing adult female in NAD.  Alert and oriented x 3.  HEENT:  Normocephalic.  Sclera anicteric. MMM.    Lymph:  No palpable cervical, supraclavicular, or axillary LAD.   Chest:  CTA bilaterally.  No wheezes or crackles.  CV:  RRR.  No audible m/r/g.  Abd:  Soft/ND/NT.    Ext:  Left lower extremity appears slightly larger than the right.  There is focal tenderness to palpation of the posterior calf in the area just below the knee.  Neuro:  Cranial nerves grossly intact. Gait stable.  No tremor or dyskinetic movements.  Psych:  Mood and affect appear normal.    Skin: No visible concerning skin rashes or lesions.    Laboratory/Imaging Studies:   I personally reviewed the below laboratories and images while in clinic today:    9/24/2024 PET/CT:  FINDINGS:      HEAD/NECK:  There is no suspicious FDG uptake in the neck.     The paranasal sinuses are clear. The  mastoid air cells are clear.      No hypermetabolic lymph nodes are demonstrated in the neck.      The mucosal and deep spaces of the neck are unremarkable. The major  salivary glands are unremarkable. Stable right thyroid nodule.  Redemonstration of patchy FDG uptake in the left thyroid. The major  vasculature of the neck are patent.     CHEST:  Stable 1.6 cm mass in the left breast with max SUV of 3.5, previously  measured the same with max SUV of 4.5. Previously seen smaller mass in  the medial left breast no longer has increased uptake.     Respiratory motion artifact limits evaluation of the lung parenchyma.  Bilateral dependent and bibasilar atelectasis. Tiny nodule within the  left upper lobe is stable, but indeterminate due to size.  The central tracheobronchial tree is clear. No pleural effusion or  pneumothorax. No acute consolidation.      No hypermetabolic lymph nodes are demonstrated in the chest.     Heart size is within normal limits. No pericardial effusion. The  thoracic aorta and main pulmonary artery are within normal limits for  diameter. The esophagus is unremarkable.      ABDOMEN AND PELVIS:  There is no suspicious FDG uptake in the abdomen or pelvis.  Evaluation of the solid abdominal and pelvic organs is limited due to  lack of IV contrast.     The liver is unremarkable. The gallbladder is unremarkable. No  intrahepatic or extrahepatic biliary ductal dilatation. The pancreas  is unremarkable. No pancreatic ductal dilatation. The spleen is  unremarkable. The adrenal glands are unremarkable.     Symmetric enhancement of the kidneys. No hydronephrosis. Incompletely  characterized cystic lesion within the right kidney. The urinary  bladder is unremarkable. The reproductive organs are unremarkable.     No hypermetabolic lymph nodes are demonstrated in the abdomen or  pelvis.     Normal caliber of the small and large bowel. Diffuse increased avidity  throughout the stomach, increased compared to  prior exam     No free air, free fluid, or fluid collection. Normal caliber of the  abdominal aorta.     BONES:   -Stable size of posterior left iliac bone lytic lesion measuring 1.0  cm with decreased FDG avidity, SUV max today 4.32, previously 8.7.  -Stable size and decreased FDG avidity of left acetabular focus, now  with SUV max of 7.3, previously 13.5.  -Decrease in FDG avidity of the T7 vertebral body sclerotic lesion.   -Stable FDG avidity of the T8 vertebral body without associated lytic  or sclerotic lesion on CT.  -Stable L4 vertebroplasty with cement extravasation into the IVC.  -Stable, likely degenerative uptake in the left C3-4 articular facet.  Degenerative uptake in the bilateral acromioclavicular joints.                                                                     IMPRESSION:   1.  Findings consistent with metabolic response to therapy, with  stable size and decreased metabolic activity of the left breast masses  and.decreased metabolic activity of the left iliac, left acetabular,  and T7 metastases.  2.  Stable FDG uptake in the T8 vertebral body without CT correlate.  3.  Stable avidity within the left lobe of the thyroid, nonspecific.  Recommend further evaluation with thyroid ultrasound previously  performed.  4.  Diffuse intensity throughout the stomach, which can be seen as  present. The lesion.  5.  Incidental CT findings, as above    10/1/2024 Labs:  Electrolytes are wnl.  Creatinine is elevated at 1.10 mg/dL  eGFR is low at 61 mL/min  Liver function tests are wnl.    WBC is low at 3.3; ANC is wnl at 1.8  Hemoglobin is low at 10.9 g/dL  Platelets are wnl.    ASSESSMENT/PLAN:   48 year old female with history of DVT and ER positive left breast cancer metastasized to bones.    1.  Metastatic breast cancer: She has been on her most recent treatment with abemaciclib and fulvestrant since 01/2024.    - PET/CT shows a good response to treatment with decrease in uptake in left breast mass as  well as in known osseous metastases.  - Plan to continue treatment with Abemaciclib and fulvestrant at this time.  Abemaciclib dose reduced to 100 mg PO BID secondary to hand foot syndrome.  Symptoms improved with the dose reduction.  - Will receive fulvestrant today.  - Recommend tissue biopsy vs Guardant 360 liquid NGS at time of progression to help guide next treatment (capivasertib vs everolimus vs elacestrant)    2.  Bone metastases/low back pain with LLE sciatica:  She is s/p XRT to the left ilium as well as the lumbar spine and kyphoplasty of L4--with some extravasation of cement which procedularist is aware of and recommended continued AC indefinitely.   - most recently received radiation to the left acetabulum and the SI, completed on 7/18/2024.  - She is currently taking oxycodone 2 tabs in the a.m. 2 in the afternoon, 2 before bed, and sometimes 2 in the early a.m.  She also takes Robaxin, and gabapentin.  Ongoing follow up with palliative medicine.  - interventional pain clinic referral placed to determine if patient may benefit from a pain relieving procedure.  - Receiving Zometa once every 3 months. Next is due around 12/3/2024.    3.  Schizoaffective disorder, bipolar type: No change with last visit.  She is on treatment with Seroquel and Zoloft. Ongoing follow up with psychiatry.     4.  LLE edema and calf pain/h/o DVT:  New left lower extremity swelling and pain in the last couple of weeks concerning for recurrent/progressive DVT.  She admits missing doses of Xarelto.   - Venous ultrasound left lower extremity.    5.  FDG uptake stomach:  I discussed this with Dr. Strickland, radiologist who read her PET/CT.  The uptake is chronic, but more pronounced on this most recent scan.  It is especially prominent in the fundus of the stomach.   - GI  referral placed for diagnostic EGD  - in the meantime, I recommend she take omeprazole 20 mg PO daily.    6.  FELTON:  Secondary to abemaciclib.  Continue to  avoid NSAIDs and encouraged good hydration.     7.  Immunocompromised patient:  Secondary to abemaciclib.  Reviewed infectious precautions.  She is open to receiving COVID vaccine today.  She received influenza vaccine at the time of her previous visit.    Follow Up:  Ultrasound venous left lower extremity  GI  referral placed for endoscopy  Interventional pain referral placed for low back pain with LLE sciatica.  Labs and fulvestrant every 4 weeks x 4.  Labs and Zometa around 12/3/2024  Visit with Alee in 2 months.  Visit with me in 4 months.    PET/CT 1-2 business days prior to return visit with me.    The longitudinal plan of care for the diagnosis(es)/condition(s) as documented were addressed during this visit. Due to the added complexity in care, I will continue to support Teresa in the subsequent management and with ongoing continuity of care.    I spent 60 minutes on the date of the encounter doing chart review, review of test results, interpretation of tests, patient visit, documentation, and discussion with other provider(s).                         Again, thank you for allowing me to participate in the care of your patient.        Sincerely,        Jahaira Plunkett MD

## 2024-10-01 NOTE — NURSING NOTE
Patient tolerated COVID vaccine injection without incident into the right deltoid. Patient instructed to wait in the lobby for 15 minutes prior to discharging home. Patient reports she will find a staff member if she has any side effects in those 15 minutes.    Patient tolerated 2 Faslodex injections without incident into her bilateral ventrogluteal muscle. Injections given at the same time by two RN's.     Patient stable upon discharge.    Abby Rueda RN

## 2024-10-02 ENCOUNTER — PATIENT OUTREACH (OUTPATIENT)
Dept: ONCOLOGY | Facility: CLINIC | Age: 49
End: 2024-10-02

## 2024-10-02 ENCOUNTER — ANCILLARY PROCEDURE (OUTPATIENT)
Dept: ULTRASOUND IMAGING | Facility: CLINIC | Age: 49
End: 2024-10-02
Attending: INTERNAL MEDICINE
Payer: MEDICARE

## 2024-10-02 DIAGNOSIS — M79.89 PAIN AND SWELLING OF LEFT LOWER EXTREMITY: ICD-10-CM

## 2024-10-02 DIAGNOSIS — M79.605 PAIN AND SWELLING OF LEFT LOWER EXTREMITY: Primary | ICD-10-CM

## 2024-10-02 DIAGNOSIS — M79.89 PAIN AND SWELLING OF LEFT LOWER EXTREMITY: Primary | ICD-10-CM

## 2024-10-02 DIAGNOSIS — I82.432 ACUTE DEEP VEIN THROMBOSIS (DVT) OF POPLITEAL VEIN OF LEFT LOWER EXTREMITY (H): ICD-10-CM

## 2024-10-02 DIAGNOSIS — M79.605 PAIN AND SWELLING OF LEFT LOWER EXTREMITY: ICD-10-CM

## 2024-10-02 LAB — RADIOLOGIST FLAGS: ABNORMAL

## 2024-10-02 PROCEDURE — 93971 EXTREMITY STUDY: CPT | Mod: LT | Performed by: STUDENT IN AN ORGANIZED HEALTH CARE EDUCATION/TRAINING PROGRAM

## 2024-10-02 NOTE — PROGRESS NOTES
[facility]: Cancer Care Plan of Care Education Note                                    Discussion with Patient:                                                      Spoke with patient and daughter on speaker phone to discuss US results. She has an increased thrombus left popliteal vein and new thrombus left femoral vein.  Dr Plunkett is recommending she restart the loading dose Xarelto;15 mg twice a day x 21 days, then resume 20 mg daily.  Dr. Plunkett has orders a repeat US for one month. Reviewed the importance of taking the blood thinner consistently. She verbalized understanding and agreement with plan. She will  new prescription of Xarelto and start right away.         Assessment:                                                      Assessment completed with:: Patient;Family    Plan of Care Education   Additional education provided for: : Anticoagulation    Evaluation of Learning  Patient Education Provided: Yes  Readiness:: Acceptance  Method:: Explanation  Response:: Verbalizes understanding        Intervention/Education provided during outreach:                                                       Answered questions to their stated satisfaction  Patient to follow up as scheduled at next appt  Patient to call/Alex and Anit message with updates  Confirmed patient has clinic and triage numbers    Miradna Cunha MSN, RN ,OCN  Lead RN Care Coordinator - [facility]  [phone]

## 2024-10-03 ENCOUNTER — DOCUMENTATION ONLY (OUTPATIENT)
Dept: ANTICOAGULATION | Facility: CLINIC | Age: 49
End: 2024-10-03
Payer: MEDICARE

## 2024-10-03 NOTE — PROGRESS NOTES
Anticoagulant Therapeutic Duplication    Duplicate orders identified: same medication but different dose, form, frequency or route    Duplicate orders appropriate-has starter pack order and ongoing therapy order     Active anticoagulant: rivaroxaban (Xarelto)    Plan made per [facility] anticoagulation protocol.    Apolonia Tan RN  10/3/2024

## 2024-10-07 DIAGNOSIS — C79.51 CARCINOMA OF LEFT BREAST METASTATIC TO BONE (H): Primary | ICD-10-CM

## 2024-10-07 DIAGNOSIS — Z17.0 MALIGNANT NEOPLASM OF OVERLAPPING SITES OF LEFT BREAST IN FEMALE, ESTROGEN RECEPTOR POSITIVE (H): ICD-10-CM

## 2024-10-07 DIAGNOSIS — C50.912 CARCINOMA OF LEFT BREAST METASTATIC TO BONE (H): Primary | ICD-10-CM

## 2024-10-07 DIAGNOSIS — C50.812 MALIGNANT NEOPLASM OF OVERLAPPING SITES OF LEFT BREAST IN FEMALE, ESTROGEN RECEPTOR POSITIVE (H): ICD-10-CM

## 2024-10-07 RX ORDER — LAMOTRIGINE 25 MG/1
500 TABLET ORAL ONCE
Status: CANCELLED | OUTPATIENT
Start: 2024-10-29 | End: 2024-10-29

## 2024-10-10 ENCOUNTER — TELEPHONE (OUTPATIENT)
Dept: ONCOLOGY | Facility: CLINIC | Age: 49
End: 2024-10-10
Payer: MEDICARE

## 2024-10-10 DIAGNOSIS — C79.51 CARCINOMA OF LEFT BREAST METASTATIC TO BONE (H): Primary | ICD-10-CM

## 2024-10-10 DIAGNOSIS — C50.912 CARCINOMA OF LEFT BREAST METASTATIC TO BONE (H): Primary | ICD-10-CM

## 2024-10-10 DIAGNOSIS — Z17.0 MALIGNANT NEOPLASM OF OVERLAPPING SITES OF LEFT BREAST IN FEMALE, ESTROGEN RECEPTOR POSITIVE (H): ICD-10-CM

## 2024-10-10 DIAGNOSIS — C50.812 MALIGNANT NEOPLASM OF OVERLAPPING SITES OF LEFT BREAST IN FEMALE, ESTROGEN RECEPTOR POSITIVE (H): ICD-10-CM

## 2024-10-18 DIAGNOSIS — C50.912 CARCINOMA OF LEFT BREAST METASTATIC TO BONE (H): Primary | ICD-10-CM

## 2024-10-18 DIAGNOSIS — C50.812 MALIGNANT NEOPLASM OF OVERLAPPING SITES OF LEFT BREAST IN FEMALE, ESTROGEN RECEPTOR POSITIVE (H): ICD-10-CM

## 2024-10-18 DIAGNOSIS — Z17.0 MALIGNANT NEOPLASM OF OVERLAPPING SITES OF LEFT BREAST IN FEMALE, ESTROGEN RECEPTOR POSITIVE (H): ICD-10-CM

## 2024-10-18 DIAGNOSIS — C79.51 CARCINOMA OF LEFT BREAST METASTATIC TO BONE (H): Primary | ICD-10-CM

## 2024-10-22 ENCOUNTER — TELEPHONE (OUTPATIENT)
Dept: ONCOLOGY | Facility: CLINIC | Age: 49
End: 2024-10-22
Payer: MEDICARE

## 2024-10-22 ENCOUNTER — VIRTUAL VISIT (OUTPATIENT)
Dept: PALLIATIVE CARE | Facility: CLINIC | Age: 49
End: 2024-10-22
Attending: STUDENT IN AN ORGANIZED HEALTH CARE EDUCATION/TRAINING PROGRAM
Payer: MEDICARE

## 2024-10-22 DIAGNOSIS — C50.812 MALIGNANT NEOPLASM OF OVERLAPPING SITES OF LEFT BREAST IN FEMALE, ESTROGEN RECEPTOR POSITIVE (H): Primary | ICD-10-CM

## 2024-10-22 DIAGNOSIS — M54.9 BACK PAIN WITH HISTORY OF SPINAL SURGERY: ICD-10-CM

## 2024-10-22 DIAGNOSIS — Z79.891 ENCOUNTER FOR LONG-TERM USE OF OPIATE ANALGESIC: ICD-10-CM

## 2024-10-22 DIAGNOSIS — Z51.5 ENCOUNTER FOR PALLIATIVE CARE: ICD-10-CM

## 2024-10-22 DIAGNOSIS — G89.3 CANCER ASSOCIATED PAIN: ICD-10-CM

## 2024-10-22 DIAGNOSIS — M62.838 MUSCLE SPASM: ICD-10-CM

## 2024-10-22 DIAGNOSIS — Z17.0 MALIGNANT NEOPLASM OF OVERLAPPING SITES OF LEFT BREAST IN FEMALE, ESTROGEN RECEPTOR POSITIVE (H): Primary | ICD-10-CM

## 2024-10-22 DIAGNOSIS — F25.0 SCHIZOAFFECTIVE DISORDER, BIPOLAR TYPE (H): ICD-10-CM

## 2024-10-22 DIAGNOSIS — Z98.890 BACK PAIN WITH HISTORY OF SPINAL SURGERY: ICD-10-CM

## 2024-10-22 PROCEDURE — 99214 OFFICE O/P EST MOD 30 MIN: CPT | Mod: 95 | Performed by: STUDENT IN AN ORGANIZED HEALTH CARE EDUCATION/TRAINING PROGRAM

## 2024-10-22 RX ORDER — OXYCODONE HYDROCHLORIDE 5 MG/1
5-10 TABLET ORAL EVERY 4 HOURS PRN
Qty: 200 TABLET | Refills: 0 | Status: SHIPPED | OUTPATIENT
Start: 2024-10-22

## 2024-10-22 NOTE — PATIENT INSTRUCTIONS
Recommendations:  -Continue oxycodone 5 to 10 mg every 4 hours as needed.  Please do not feel like you have to ration your medication and run out before our visits.  When you feel like you need a refill, please let me know, and we we will do the correct counts on the pills.  Let me know when you have about 3 days left of the oxycodone.  -Phone number to call for pain team- [phone].  I would get this set up ASA.  -Follow-up with psychiatry and let them know about your concerns with worsening depression.    Follow up: 2 to 3 months      Reasons to Call    If you are having worsening/uncontrolled symptoms we want you to call!    You or your other physicians make any changes to medications we have prescribed.  -Please call for refills 4-5 days before you will run out of medication.    Important Phone Numbers, including: Refills, scheduling, and general questions     Palliative Care RN: Aisha Shelton : [phone]  *For scheduling needs/follow up visits : [phone]  *After hours or on weekends- Will connect you with on call MD : [phone].

## 2024-10-22 NOTE — TELEPHONE ENCOUNTER
Oral Chemotherapy Monitoring Program     Placed call to patient in follow up of abemaciclib (Verzenio) therapy.     Left message to please call back in follow up of therapy. No patient or drug names were mentioned.    Harjit Light  Pharmacy Intern  Oral Chemotherapy Monitoring Program  [facility]   [phone]

## 2024-10-22 NOTE — PROGRESS NOTES
Palliative Care Progress Note    Patient Name: Teresa Sanderson  Primary Provider: No Ref-Primary, Physician    Chief Complaint/Patient ID:   Medical - She has metastatic breast cancer dx 2020; widespread bone mets. S/p RT to lumbar spine and RFA/kyphoplasty L4 2021.   She has been on several different lines of therapy (with some treatment interruptions).  -PET/CT in 2023 showed PD in two small bone metastasis in L pelvis, S/p 5 fractions RT to these lesions.   -Some lapses/breaks in treatment due to complicated social situation.  -Evidence of PD on 2023 PET scan.  Changed to Verzenio 2024 and Fulvestrant. Again s/p RT to low back and L hip 2024.    -Long discussion about voluntary opioid tapering 11/15/22 palliative visit. Due to worsening pain from bony mets, retrial of opioids Rx 2024.     -She has schizoaffective disorder (bipolar type); followed by psychiatry at the .    Last Palliative care appointment: 8/15/2024 with me.      Reviewed: Yes. Last refill of #120 tabs oxycodone 5mg on .    Social History: Lives with cousin and son. On SSD. 5 adult children. Housing insecure. Daughter murdered in . Son  Spring 2022. Worked as a  for [employer] before cancer dx, in [place]. No LOYD hx    Interim History:  Teresa Sanderson is a 49 year old female who is seen today for follow up with Palliative Care via billable video visit.     Reviewed oncology note from 10/1.  Continues on Abemaciclib and fulvestrant.  Worsening pain in her low back and left leg, exacerbated by PT.  Otherwise, she is tolerating her treatment regimen well.  Recent imaging has looked good, so planning to continue on current regimen.  Referral was placed to interventional pain team for assessment.    Send the left leg pain that was so bad was ultimately due to a blood clot.  She has been taking her Xarelto more regularly again.  She stopped going to formal physical therapy due to the pain, however she  still has been doing some of her PT exercises in bed.  Notes it is hard to get out of bed, likely from a combination of pain but she is also wondering if her depression is worsening.  Her daughter is leaving little optimistic positive notes around the house for her which is steffen.  She sees psychiatry on 10/25.    Says that pain has continued to be a problem, and she is always worried about calling too early for refill.  She tends to ration her oxycodone and try to make them last longer.  She then ran out early but figured she had a visit with me in just a couple of days so decided to wait to talk to me.    Physical Exam:   Constitutional: Alert, pleasant, no apparent distress. Lying back in bed.  Eyes: Sclera non-icteric, no eye discharge.  ENT: No nasal discharge. Ears grossly normal.  Respiratory: Unlabored respirations. Speaking in full sentences.  Musculoskeletal: Extremities appear normal- no gross deformities noted. No edema noted on upper body.   Skin: No suspicious lesions or rashes on visible skin.  Neurologic: Clear speech, no aphasia. No facial droop.  Psychiatric: Mentation appears normal, appropriate attention. Affect normal/bright. Does not appear anxious or depressed.    Key Data Reviewed:  LABS:   Lab Results   Component Value Date    WBC 3.3 (L) 10/01/2024    HGB 10.9 (L) 10/01/2024    HCT 32.8 (L) 10/01/2024     10/01/2024     10/01/2024    POTASSIUM 4.1 10/01/2024    CHLORIDE 104 10/01/2024    CO2 26 10/01/2024    BUN 10.9 10/01/2024    CR 1.10 (H) 10/01/2024     (H) 10/01/2024    DD 1.0 (H) 12/09/2020    TROPONIN <0.015 08/10/2021    TROPI <0.015 12/08/2020    AST 17 10/01/2024    ALT 13 10/01/2024    ALKPHOS 92 10/01/2024    BILITOTAL 0.3 10/01/2024    INR 1.11 07/01/2024     IMAGING: Left Lower Extremity U/S 10/2/24- IMPRESSION: Progression of still nonocclusive DVT within the left popliteal vein, and now with nonocclusive DVT within the peripheral left femoral vein.  Patient reports poor compliance with Xarelto.      [Access Center: Progression of nonocclusive DVT within the left  popliteal vein, now also with nonocclusive DVT within the peripheral  left femoral vein.].    PET scan 10/1/24- IMPRESSION:   1.  Findings consistent with metabolic response to therapy, with stable size and decreased metabolic activity of the left breast masses and decreased metabolic activity of the left iliac, left acetabular, and T7 metastases.  2.  Stable FDG uptake in the T8 vertebral body without CT correlate.  3.  Stable avidity within the left lobe of the thyroid, nonspecific.  Recommend further evaluation with thyroid ultrasound previously  performed.  4.  Diffuse intensity throughout the stomach, which can be seen as  Present the lesion.    Impression & Recommendations & Counseling:  Teresa Sanderson is a 49 year old female with history of metastatic breast cancer.     Ongoing struggle with different types of pain concurrently.  No benefit from 2 separate trials of Butrans patch.  Given the new bony mets,  retrialed short acting oxycodone again (she previously did not seem to have much benefit from her chronic pain with opioids, and she had difficulty with consistency.).  This is helpful.    Recommendations:  -Refill sent for oxycodone 5-10mg Q4H PRN.  Discussed taking the oxycodone prior to an activity that she knows will cause pain.  Also encouraged her to call when she needs a refill, as I have been doing smaller quantities based on her past use.  I do not want her to run out of medication or feeling she has to ration her medication.  -Previously encouraged to use heat and trial topical agents such as Voltaren.  -Encouraged to continue with physical therapy appointments and exercises.  -Continue Robaxin and gabapentin.  -Agree with referral to pain clinic.  She is unsure she has the number, so I put this in her after visit summary.  -Overloaded with appointments right now due to  radiation, however once she is through radiation, I would recommend a referral to Dr. Bell in PM&R.  I do wonder if there is a component of her back pain that might respond to injections.  -Encouraged to talk to psychiatry on 10/25 about her concerns of worsening depression.      Follow up: 2 to 3 months      Video-Visit Details  Video Start Time: 4:31 PM  Video End Time: 4:52 PM    Originating Location (pt. Location): Home     Distant Location (provider location):  Offsite- Personal Home      Platform used for Video Visit: Chad     Total time spent on day of encounter is 30 mins, including reviewing record, review of above studies, above visit with patient, symptomatic discussion of primarily pain and mood, including medication adjustments/prescription management, and documentation.           Ayanna Tellez,   Palliative Medicine   [facility] ID [id number]    Some chart documentation performed using Dragon Voice recognition Software. Although reviewed after completion, some words and grammatical errors may remain.

## 2024-10-22 NOTE — NURSING NOTE
Current patient location: [address]   [facility] [address]    Is the patient currently in the state of [place]? YES    Visit mode:VIDEO    If the visit is dropped, the patient can be reconnected by: VIDEO VISIT: Text to cell phone:   Telephone Information:   Mobile [phone]       Will anyone else be joining the visit? NO  (If patient encounters technical issues they should call [phone])    Are changes needed to the allergy or medication list? Pt stated no changes to allergies and Pt stated no med changes    Are refills needed on medications prescribed by this physician? YES    Rooming Documentation:  Questionnaire(s) completed    Reason for visit: RECHECK    Jorge CUELLAR

## 2024-10-22 NOTE — LETTER
10/22/2024       RE: Teresa Sanderson  [address]  [facility] [address]     Dear Colleague,    [facility] you [facility] your [facility] Sanderson[facility]. Please see a copy of my visit note below.    Palliative Care Progress Note    Patient Name: Teresa Sanderson  Primary Provider: No Ref-Primary, Physician    Chief Complaint/Patient ID:   Medical - She has metastatic breast cancer dx 2020; widespread bone mets. S/p RT to lumbar spine and RFA/kyphoplasty L4 2021.   She has been on several different lines of therapy (with some treatment interruptions).  -PET/CT in 2023 showed PD in two small bone metastasis in L pelvis, S/p 5 fractions RT to these lesions.   -Some lapses/breaks in treatment due to complicated social situation.  -Evidence of PD on 2023 PET scan.  Changed to Verzenio 2024 and Fulvestrant. Again s/p RT to low back and L hip 2024.    -Long discussion about voluntary opioid tapering 11/15/22 palliative visit. Due to worsening pain from bony mets, retrial of opioids Rx 2024.     -She has schizoaffective disorder (bipolar type); followed by psychiatry at the .    Last Palliative care appointment: 8/15/2024 with me.      Reviewed: Yes. Last refill of #120 tabs oxycodone 5mg on .    Social History: Lives with cousin and son. On SSD. 5 adult children. Housing insecure. Daughter murdered in . Son  Spring 2022. Worked as a  for [employer] before cancer dx, in [place]. No LOYD hx    Interim History:  Teresa Sanderson is a 49 year old female who is seen today for follow up with Palliative Care via billable video visit.     Reviewed oncology note from 10/1.  Continues on Abemaciclib and fulvestrant.  Worsening pain in her low back and left leg, exacerbated by PT.  Otherwise, she is tolerating her treatment regimen well.  Recent imaging has looked  good, so planning to continue on current regimen.  Referral was placed to interventional pain team for assessment.    Send the left leg pain that was so bad was ultimately due to a blood clot.  She has been taking her Xarelto more regularly again.  She stopped going to formal physical therapy due to the pain, however she still has been doing some of her PT exercises in bed.  Notes it is hard to get out of bed, likely from a combination of pain but she is also wondering if her depression is worsening.  Her daughter is leaving little optimistic positive notes around the house for her which is steffen.  She sees psychiatry on 10/25.    Says that pain has continued to be a problem, and she is always worried about calling too early for refill.  She tends to ration her oxycodone and try to make them last longer.  She then ran out early but figured she had a visit with me in just a couple of days so decided to wait to talk to me.    Physical Exam:   Constitutional: Alert, pleasant, no apparent distress. Lying back in bed.  Eyes: Sclera non-icteric, no eye discharge.  ENT: No nasal discharge. Ears grossly normal.  Respiratory: Unlabored respirations. Speaking in full sentences.  Musculoskeletal: Extremities appear normal- no gross deformities noted. No edema noted on upper body.   Skin: No suspicious lesions or rashes on visible skin.  Neurologic: Clear speech, no aphasia. No facial droop.  Psychiatric: Mentation appears normal, appropriate attention. Affect normal/bright. Does not appear anxious or depressed.    Key Data Reviewed:  LABS:   Lab Results   Component Value Date    WBC 3.3 (L) 10/01/2024    HGB 10.9 (L) 10/01/2024    HCT 32.8 (L) 10/01/2024     10/01/2024     10/01/2024    POTASSIUM 4.1 10/01/2024    CHLORIDE 104 10/01/2024    CO2 26 10/01/2024    BUN 10.9 10/01/2024    CR 1.10 (H) 10/01/2024     (H) 10/01/2024    DD 1.0 (H) 12/09/2020    TROPONIN <0.015 08/10/2021    TROPI <0.015 12/08/2020     AST 17 10/01/2024    ALT 13 10/01/2024    ALKPHOS 92 10/01/2024    BILITOTAL 0.3 10/01/2024    INR 1.11 07/01/2024     IMAGING: Left Lower Extremity U/S 10/2/24- IMPRESSION: Progression of still nonocclusive DVT within the left popliteal vein, and now with nonocclusive DVT within the peripheral left femoral vein. Patient reports poor compliance with Xarelto.      [Access Center: Progression of nonocclusive DVT within the left  popliteal vein, now also with nonocclusive DVT within the peripheral  left femoral vein.].    PET scan 10/1/24- IMPRESSION:   1.  Findings consistent with metabolic response to therapy, with stable size and decreased metabolic activity of the left breast masses and decreased metabolic activity of the left iliac, left acetabular, and T7 metastases.  2.  Stable FDG uptake in the T8 vertebral body without CT correlate.  3.  Stable avidity within the left lobe of the thyroid, nonspecific.  Recommend further evaluation with thyroid ultrasound previously  performed.  4.  Diffuse intensity throughout the stomach, which can be seen as  Present the lesion.    Impression & Recommendations & Counseling:  Teresa Sanderson is a 49 year old female with history of metastatic breast cancer.     Ongoing struggle with different types of pain concurrently.  No benefit from 2 separate trials of Butrans patch.  Given the new bony mets,  retrialed short acting oxycodone again (she previously did not seem to have much benefit from her chronic pain with opioids, and she had difficulty with consistency.).  This is helpful.    Recommendations:  -Refill sent for oxycodone 5-10mg Q4H PRN.  Discussed taking the oxycodone prior to an activity that she knows will cause pain.  Also encouraged her to call when she needs a refill, as I have been doing smaller quantities based on her past use.  I do not want her to run out of medication or feeling she has to ration her medication.  -Previously encouraged to use heat and  trial topical agents such as Voltaren.  -Encouraged to continue with physical therapy appointments and exercises.  -Continue Robaxin and gabapentin.  -Agree with referral to pain clinic.  She is unsure she has the number, so I put this in her after visit summary.  -Overloaded with appointments right now due to radiation, however once she is through radiation, I would recommend a referral to Dr. Bell in PM&R.  I do wonder if there is a component of her back pain that might respond to injections.  -Encouraged to talk to psychiatry on 10/25 about her concerns of worsening depression.      Follow up: 2 to 3 months      Video-Visit Details  Video Start Time: 4:31 PM  Video End Time: 4:52 PM    Originating Location (pt. Location): Home     Distant Location (provider location):  Offsite- Personal Home      Platform used for Video Visit: BizSlate     Total time spent on day of encounter is 30 mins, including reviewing record, review of above studies, above visit with patient, symptomatic discussion of primarily pain and mood, including medication adjustments/prescription management, and documentation.           Ayanna Tellez DO  Palliative Medicine   [facility] ID [id number]    Some chart documentation performed using Dragon Voice recognition Software. Although reviewed after completion, some words and grammatical errors may remain.      Again, thank you for allowing me to participate in the care of your patient.      Sincerely,    Ayanna Tellez DO

## 2024-10-23 ENCOUNTER — PATIENT OUTREACH (OUTPATIENT)
Dept: CARE COORDINATION | Facility: CLINIC | Age: 49
End: 2024-10-23
Payer: MEDICARE

## 2024-10-23 NOTE — PROGRESS NOTES
Social Work - Distress Screen Intervention  [facility]    Identified Concern and Score from Distress Screenin. How concerned are you about your ability to eat? 0     2. How concerned are you about unintended weight loss or your current weight? 0     3. How concerned are you about feeling depressed or very sad?  7     4. How concerned are you about feeling anxious or very scared?  (!) 8     5. Do you struggle with the loss of meaning and david in your life?  (!) A great deal     6. How concerned are you about work and home life issues that may be affected by your cancer?  (!) 8     7. How concerned are you about knowing what resources are available to help you?  (!) 10     8. Do you currently have what you would describe as Yazdanism or spiritual struggles? Somewhat     9. If you want to be contacted by one of our professionals, I can send a message to them right now.  No data recorded     Date of Distress Screen: 10/22/24  Data: At time of last visit, patient scored positive on distress screening.  outreached to patient today to follow up on elevated distress and introduce psychosocial services and support.  Intervention/Education provided:  contacted patient by phone to discuss distress screening results. Left voicemail message    Follow-up Required:  to remain available for support and await return call from patient    Keren Tolbert, DANA, LICSW, OSW-C  Clinical - Adult Oncology  Phone: [phone]  She/Her/Hers  [facility]: Evelyn CARDOZO  8am-4:30pm  [facility]: MARIELLA Walker F 8am-4:30pm   Support Groups at [facility]: Social Work Services for Cancer Patients ([email or web address])

## 2024-10-29 ENCOUNTER — OFFICE VISIT (OUTPATIENT)
Dept: INTERNAL MEDICINE | Facility: CLINIC | Age: 49
End: 2024-10-29
Payer: MEDICARE

## 2024-10-29 VITALS
TEMPERATURE: 98.3 F | BODY MASS INDEX: 32.79 KG/M2 | SYSTOLIC BLOOD PRESSURE: 117 MMHG | DIASTOLIC BLOOD PRESSURE: 81 MMHG | HEART RATE: 66 BPM | RESPIRATION RATE: 16 BRPM | OXYGEN SATURATION: 97 % | HEIGHT: 71 IN | WEIGHT: 234.2 LBS

## 2024-10-29 DIAGNOSIS — F25.0 SCHIZOAFFECTIVE DISORDER, BIPOLAR TYPE (H): Primary | ICD-10-CM

## 2024-10-29 DIAGNOSIS — F41.9 ANXIETY DISORDER, UNSPECIFIED TYPE: ICD-10-CM

## 2024-10-29 DIAGNOSIS — F51.01 PRIMARY INSOMNIA: ICD-10-CM

## 2024-10-29 DIAGNOSIS — Z23 NEED FOR TDAP VACCINATION: ICD-10-CM

## 2024-10-29 RX ORDER — SERTRALINE HYDROCHLORIDE 100 MG/1
200 TABLET, FILM COATED ORAL DAILY
Qty: 135 TABLET | Refills: 0 | Status: CANCELLED | OUTPATIENT
Start: 2024-10-29

## 2024-10-29 RX ORDER — RAMELTEON 8 MG/1
8 TABLET ORAL AT BEDTIME
Qty: 30 TABLET | Refills: 3 | Status: SHIPPED | OUTPATIENT
Start: 2024-10-29

## 2024-10-29 ASSESSMENT — PATIENT HEALTH QUESTIONNAIRE - PHQ9
SUM OF ALL RESPONSES TO PHQ QUESTIONS 1-9: 14
5. POOR APPETITE OR OVEREATING: MORE THAN HALF THE DAYS

## 2024-10-29 ASSESSMENT — ANXIETY QUESTIONNAIRES
GAD7 TOTAL SCORE: 16
6. BECOMING EASILY ANNOYED OR IRRITABLE: SEVERAL DAYS
1. FEELING NERVOUS, ANXIOUS, OR ON EDGE: NEARLY EVERY DAY
GAD7 TOTAL SCORE: 16
7. FEELING AFRAID AS IF SOMETHING AWFUL MIGHT HAPPEN: NEARLY EVERY DAY
5. BEING SO RESTLESS THAT IT IS HARD TO SIT STILL: SEVERAL DAYS
2. NOT BEING ABLE TO STOP OR CONTROL WORRYING: NEARLY EVERY DAY
3. WORRYING TOO MUCH ABOUT DIFFERENT THINGS: NEARLY EVERY DAY

## 2024-10-29 ASSESSMENT — ENCOUNTER SYMPTOMS
CARDIOVASCULAR NEGATIVE: 1
ACTIVITY CHANGE: 1
APPETITE CHANGE: 0
FATIGUE: 1
DYSPHORIC MOOD: 1
SLEEP DISTURBANCE: 1
MUSCULOSKELETAL NEGATIVE: 1
GASTROINTESTINAL NEGATIVE: 1
NERVOUS/ANXIOUS: 1

## 2024-10-29 NOTE — PATIENT INSTRUCTIONS
"-- Call back  regarding [facility] Financial Assistance Program (see below for full message and contact information)  -- Call back  regarding psychotherapy and mental health resources   DANA Pearson, ROBIN, OSW-C  Clinical - Adult Oncology  Phone: [phone]   -- Give your kids access to your [employer]   -- Dr. Frazier will reach out Psychiatry to make sure that they follow up with you  -- STOP Atarax (Hydroxyzine)     [facility] [employer] - SOCIAL WORK  \"Nikolas Moore,     I gave you a call and thought I would reach out to you on here as well. Your care team stated you may be looking for  some assistance regarding medical expenses. I'm sharing some information below that may be helpful. Otherwise please feel free to give me a call and let me know what your questions are and I can see how I can help.      I noticed that in your chart we do not have your MA listed for your insurance. I am wondering if the billing department has this information. I would check this and make sure they have your updated insurance information if you haven't already as this may address some of your balance.      Also, [facility] Financial assistance program could be helpful to you as well with addressing your medical expanses balance. It is income based assistance and you could qualify for % assistance toward your [facility] balance.  You can connect to this resource in one of two ways.      One access it through [employer] under the billing tab and apply for the assistance that way.      OR     Calling the [facility] billing department and speak with someone by phone to see if you qualify and -they will assist you with applying. [facility] Billing Dept # [phone]     Jo Francisco MSW, ROBIN    [facility]  [address] [place] [id number]  Office: [phone]  Fax: [phone]    "

## 2024-10-29 NOTE — PROGRESS NOTES
Assessment & Plan   Teresa Sanderson is a 48 yo female with known breast cancer and bipolar disorder complicated by insomnia coming in for recheck on her mental health. Has not been able to see psych or speak with social work since I last saw her. She is able to contract for safety and home and has the assistance of her daughter, but I am concerned about what appears to be progression and decompensation of her bipolar disorder. Plan as below.    (F25.0) Schizoaffective disorder, bipolar type (H)  (primary encounter diagnosis)  Comment: Struggling with sleep, anxiety, depression in the setting of stage IV breast cancer with bony mets, housing instability, food insecurity. Was not able to follow up with psychiatry 10/25 and I think really needs to be seen by them. Per their plan, considering alternative SGA for uncontrolled disease and I think we have reached that point given her GAD7, PHQ9, and inability to engage with most ADLs given worsening symptoms. Therefore, I reached out to psychiatry for Teresa following our visit in the hopes that they can reach out to her for scheduling. She has good insight into her mental health and understands the necessity of seeing psychiatry on a regular basis, but is struggling to engage with them given her worsening mental health and fatigue in the setting of that.   Plan: ramelteon (ROZEREM) 8 MG tablet    (F41.9) Anxiety disorder, unspecified type  Comment: Increasing and impacting ability to sleep or engage in daily living activities. Needs to see psych in follow up -- did message previous provider to work on getting Teresa in sooner. JENNIFFER completed today with score of 16.   Plan: ramelteon (ROZEREM) 8 MG tablet    (F51.01) Primary insomnia  Comment: Not sleeping well despite Seroquel 400mg at bedtime with prn 100mg Seroquel on top. Has not had success with Trazodone, Ambien, Doxepin, Benadryl, Atarax, or Remeron in the past. Willing to try Ramelteon to see if it helps. I  "think ultimately that this is related to her decompensating bipolar disorder but am wanting to assist Teresa in resting as I think it would help her to better engage in her healthcare.   Plan: ramelteon (ROZEREM) 8 MG tablet    Phone numbers for financial SW and oncology SW re: psychosocial help included for pt in her AVS.    BMI  Estimated body mass index is 32.68 kg/m  as calculated from the following:    Height as of this encounter: 1.803 m (5' 10.98\").    Weight as of this encounter: 106.2 kg (234 lb 3.2 oz).     Depression Screening Follow Up        10/29/2024    11:05 AM   PHQ   PHQ-9 Total Score 14   Q9: Thoughts of better off dead/self-harm past 2 weeks Not at all         10/29/2024    11:05 AM   Last PHQ-9   1.  Little interest or pleasure in doing things 1   2.  Feeling down, depressed, or hopeless 1   3.  Trouble falling or staying asleep, or sleeping too much 3   4.  Feeling tired or having little energy 3   5.  Poor appetite or overeating 1   6.  Feeling bad about yourself 1   7.  Trouble concentrating 2   8.  Moving slowly or restless 2   Q9: Thoughts of better off dead/self-harm past 2 weeks 0   PHQ-9 Total Score 14         Follow Up Actions Taken  Crisis resource information provided in After Visit Summary  Referred patient back to current mental health provider.  Referred patient back to PCP  Follow up recommended: Messaged psychiatry following visit. She is able to contract for safety at this time, missed psych appt 10/25 and REALLY needs to be seen by them again. CTM.        MEDICATIONS:        - Trial of Ramelteon 8mg at bedtime for sleep       - Continue other medications without change  FUTURE APPOINTMENTS:       - Follow-up visit in 7-8 weeks to review social concerns, mental health       - Make appointment with psychiatric specialist  See Patient Instructions  Patient Instructions   -- Call back  regarding [employer] Financial Assistance Program (see below for full message and " "contact information)  -- Call back  regarding psychotherapy and mental health resources   DANA Pearson, [facility] Care[facility], OSW-C  Clinical - Adult Oncology  Phone: [phone]   -- Give your kids access to your aitainmentt   -- Dr. Frazier will reach out Psychiatry to make sure that they follow up with you  -- STOP Atarax (Hydroxyzine)     [facility] [employer] - SOCIAL WORK  \"Hi Teresa,     I gave you a call and thought I would reach out to you on here as well. Your care team stated you may be looking for  some assistance regarding medical expenses. I'm sharing some information below that may be helpful. Otherwise please feel free to give me a call and let me know what your questions are and I can see how I can help.      I noticed that in your chart we do not have your MA listed for your insurance. I am wondering if the billing department has this information. I would check this and make sure they have your updated insurance information if you haven't already as this may address some of your balance.      Also, [employer] Financial assistance program could be helpful to you as well with addressing your medical expanses balance. It is income based assistance and you could qualify for % assistance toward your [employer] balance.  You can connect to this resource in one of two ways.      One access it through [employer] under the billing tab and apply for the assistance that way.      OR     Calling the [employer] billing department and speak with someone by phone to see if you qualify and -they will assist you with applying. [employer] Billing Dept # [phone]     Jo Francisco MSW, [facility]    [facility]  [address] [place] [address]  Office: [phone]  Fax: [phone]      Return in about 7 weeks (around 12/17/2024) for with me, Follow up.    Trung Moore is a 49 year old, " presenting for the following health issues:  Follow Up (Pt would like to discuss mental health including anxiety)        10/29/2024     9:42 AM   Additional Questions   Roomed by Eliza REDDING   Accompanied by N/A     Via the Health Maintenance questionnaire, the patient has reported the following services have been completed -Mammogram: [facility] 2023-06-01, this information has been sent to the abstraction team.    Sleeping -- Taking Seroquel 400mg at 2000 most nights but is unable to fall asleep initially -- she states that her mind is going too fast. She is taking another 100mg as prescribed around 0100 if she is unable to fall asleep by that time. Most days, she was up at 1200 or 1300.     Has not had any documented or historical success in the past with Trazodone, Ambien, Doxepin, Benadryl, Remeron, or other alternatives. Has never tried Ramelteon.     Home life and mental health -- Lives with daughter and son currently in addition to her grandchildren, her daughter is her PCA. States that her grandchildren keep her sane and calm her down -- they are 5 years old and 8 years old. Her children are trying to help with her stress. She states that she feels she does make life difficult for her daughter sometimes and wants to stop doing this, she knows he daughter is doing her best. They are dealing with trying to find a new place to live on top of her chronic illness. Constraints that currently exist include finances, location of housing.     To deal with her stress, she prays a lot and hopes that it passes by. She wants to be able to go to [employer], get to her appointments, and help her family but she is too tired most of the time to do these things. However, without the Seroquel at night cannot sleep.     Does not feel that the Atarax has made a big difference for her. Takes 50mg in the AM and 50mg again at night and has not noticed a change in anxiety whatsoever with this as a prn.     Saw Psychiatry at the [place]  "clinic via virtual visit last July 2024 with Dr. Stapleton. Discussed at that time potentially starting a different SGA if Seroquel increase did not make a difference in her mood. Has not been able to make it to her follow up appt with them given she has slept through virtual visits. Knows that she needs to see them soon.     Pain -- Notes that pain is an ongoing problem and has not called the pain clinic yet, knows that she needs to.    History of Present Illness       Reason for visit:  Other She is missing 2 dose(s) of medications per week.         Review of Systems   Constitutional:  Positive for activity change and fatigue. Negative for appetite change.   Cardiovascular: Negative.    Gastrointestinal: Negative.    Musculoskeletal: Negative.    Psychiatric/Behavioral:  Positive for behavioral problems, dysphoric mood and sleep disturbance. Negative for suicidal ideas. The patient is nervous/anxious.            Objective    /81 (BP Location: Right arm, Patient Position: Sitting, Cuff Size: Adult Large)   Pulse 66   Temp 98.3  F (36.8  C)   Resp 16   Ht 1.803 m (5' 10.98\")   Wt 106.2 kg (234 lb 3.2 oz)   SpO2 97%   BMI 32.68 kg/m    Body mass index is 32.68 kg/m .  Physical Exam  Vitals and nursing note reviewed.   Constitutional:       General: She is not in acute distress.     Appearance: Normal appearance. She is obese.   Eyes:      Extraocular Movements: Extraocular movements intact.   Neurological:      Mental Status: She is alert and oriented to person, place, and time. Mental status is at baseline.   Psychiatric:         Attention and Perception: Attention normal.         Mood and Affect: Mood is anxious and depressed. Affect is tearful.         Speech: Speech normal.         Behavior: Behavior normal. Behavior is cooperative.         Thought Content: Thought content does not include homicidal or suicidal ideation. Thought content does not include homicidal or suicidal plan.         Cognition and " Memory: Cognition normal.         Judgment: Judgment normal.      Comments: Appears to have appropriate insight into her diagnoses and what are the biggest barriers to engaging with healthcare currently. Does not appear to be responding to internal stimuli. Actively engaged throughout appt.                Signed Electronically by: Lavonne Frazier MD

## 2024-10-30 ENCOUNTER — INFUSION THERAPY VISIT (OUTPATIENT)
Dept: ONCOLOGY | Facility: CLINIC | Age: 49
End: 2024-10-30
Attending: INTERNAL MEDICINE
Payer: MEDICARE

## 2024-10-30 ENCOUNTER — APPOINTMENT (OUTPATIENT)
Dept: LAB | Facility: CLINIC | Age: 49
End: 2024-10-30
Attending: INTERNAL MEDICINE
Payer: MEDICARE

## 2024-10-30 VITALS
DIASTOLIC BLOOD PRESSURE: 87 MMHG | BODY MASS INDEX: 32.95 KG/M2 | RESPIRATION RATE: 18 BRPM | TEMPERATURE: 98.4 F | SYSTOLIC BLOOD PRESSURE: 123 MMHG | HEART RATE: 71 BPM | WEIGHT: 236.11 LBS | OXYGEN SATURATION: 96 %

## 2024-10-30 DIAGNOSIS — C79.51 CARCINOMA OF LEFT BREAST METASTATIC TO BONE (H): ICD-10-CM

## 2024-10-30 DIAGNOSIS — C50.919 BREAST CANCER (H): ICD-10-CM

## 2024-10-30 DIAGNOSIS — Z17.0 MALIGNANT NEOPLASM OF OVERLAPPING SITES OF LEFT BREAST IN FEMALE, ESTROGEN RECEPTOR POSITIVE (H): Primary | ICD-10-CM

## 2024-10-30 DIAGNOSIS — C50.812 MALIGNANT NEOPLASM OF OVERLAPPING SITES OF LEFT BREAST IN FEMALE, ESTROGEN RECEPTOR POSITIVE (H): Primary | ICD-10-CM

## 2024-10-30 DIAGNOSIS — C50.912 CARCINOMA OF LEFT BREAST METASTATIC TO BONE (H): ICD-10-CM

## 2024-10-30 LAB
ALBUMIN SERPL BCG-MCNC: 3.9 G/DL (ref 3.5–5.2)
ALP SERPL-CCNC: 87 U/L (ref 40–150)
ALT SERPL W P-5'-P-CCNC: 14 U/L (ref 0–50)
ANION GAP SERPL CALCULATED.3IONS-SCNC: 9 MMOL/L (ref 7–15)
AST SERPL W P-5'-P-CCNC: 20 U/L (ref 0–45)
BASOPHILS # BLD AUTO: 0 10E3/UL (ref 0–0.2)
BASOPHILS NFR BLD AUTO: 1 %
BILIRUB SERPL-MCNC: 0.2 MG/DL
BUN SERPL-MCNC: 9.4 MG/DL (ref 6–20)
CALCIUM SERPL-MCNC: 9.1 MG/DL (ref 8.8–10.4)
CANCER AG15-3 SERPL-ACNC: 51 U/ML
CEA SERPL-MCNC: 3.7 NG/ML
CHLORIDE SERPL-SCNC: 106 MMOL/L (ref 98–107)
CREAT SERPL-MCNC: 0.95 MG/DL (ref 0.51–0.95)
EGFRCR SERPLBLD CKD-EPI 2021: 73 ML/MIN/1.73M2
EOSINOPHIL # BLD AUTO: 0.1 10E3/UL (ref 0–0.7)
EOSINOPHIL NFR BLD AUTO: 2 %
ERYTHROCYTE [DISTWIDTH] IN BLOOD BY AUTOMATED COUNT: 13.2 % (ref 10–15)
GLUCOSE SERPL-MCNC: 99 MG/DL (ref 70–99)
HCO3 SERPL-SCNC: 26 MMOL/L (ref 22–29)
HCT VFR BLD AUTO: 29.6 % (ref 35–47)
HGB BLD-MCNC: 10 G/DL (ref 11.7–15.7)
IMM GRANULOCYTES # BLD: 0 10E3/UL
IMM GRANULOCYTES NFR BLD: 0 %
LYMPHOCYTES # BLD AUTO: 1 10E3/UL (ref 0.8–5.3)
LYMPHOCYTES NFR BLD AUTO: 34 %
MCH RBC QN AUTO: 35 PG (ref 26.5–33)
MCHC RBC AUTO-ENTMCNC: 33.8 G/DL (ref 31.5–36.5)
MCV RBC AUTO: 104 FL (ref 78–100)
MONOCYTES # BLD AUTO: 0.3 10E3/UL (ref 0–1.3)
MONOCYTES NFR BLD AUTO: 11 %
NEUTROPHILS # BLD AUTO: 1.5 10E3/UL (ref 1.6–8.3)
NEUTROPHILS NFR BLD AUTO: 52 %
NRBC # BLD AUTO: 0 10E3/UL
NRBC BLD AUTO-RTO: 0 /100
PLATELET # BLD AUTO: 164 10E3/UL (ref 150–450)
POTASSIUM SERPL-SCNC: 3.2 MMOL/L (ref 3.4–5.3)
PROT SERPL-MCNC: 7 G/DL (ref 6.4–8.3)
RBC # BLD AUTO: 2.86 10E6/UL (ref 3.8–5.2)
SODIUM SERPL-SCNC: 141 MMOL/L (ref 135–145)
WBC # BLD AUTO: 3 10E3/UL (ref 4–11)

## 2024-10-30 PROCEDURE — 36415 COLL VENOUS BLD VENIPUNCTURE: CPT

## 2024-10-30 PROCEDURE — 82435 ASSAY OF BLOOD CHLORIDE: CPT

## 2024-10-30 PROCEDURE — 250N000011 HC RX IP 250 OP 636: Mod: JZ | Performed by: INTERNAL MEDICINE

## 2024-10-30 PROCEDURE — 82378 CARCINOEMBRYONIC ANTIGEN: CPT

## 2024-10-30 PROCEDURE — 85004 AUTOMATED DIFF WBC COUNT: CPT

## 2024-10-30 PROCEDURE — 86300 IMMUNOASSAY TUMOR CA 15-3: CPT

## 2024-10-30 PROCEDURE — 96402 CHEMO HORMON ANTINEOPL SQ/IM: CPT

## 2024-10-30 RX ORDER — LAMOTRIGINE 25 MG/1
500 TABLET ORAL ONCE
Status: COMPLETED | OUTPATIENT
Start: 2024-10-30 | End: 2024-10-30

## 2024-10-30 RX ADMIN — FULVESTRANT 500 MG: 50 INJECTION, SOLUTION INTRAMUSCULAR at 12:20

## 2024-10-30 ASSESSMENT — PAIN SCALES - GENERAL: PAINLEVEL_OUTOF10: SEVERE PAIN (7)

## 2024-10-30 NOTE — PROGRESS NOTES
Infusion Injection Note:  Teresa Sanderson presents today for Faslodex injections.    Patient seen by provider today: No, Dr. Plunkett seen 10/1   present during visit today: Not Applicable.    Magda Bautista RN checked in with patient prior to injection.     Treatment Conditions:  Not Applicable.    Pre and Post Injection:  Faslodex injections given concurrently by writer and MB to ventrogluteal site, bilaterally without incident.   Patient tolerated procedure well.    Discharge Plan:  Patient declined prescription refills.  Discharge instructions reviewed with: Patient.  Patient and/or family verbalized understanding of discharge instructions and all questions answered.  AVS to patient via [employer].    Patient discharged in stable condition accompanied by: daughter.  Departure Mode: Ambulatory.  Patient will return 11/25/2024 for next appointment.      Eloisa JONES on 10/30/2024 at 12:34 PM

## 2024-10-30 NOTE — NURSING NOTE
"Pt with Low Back Pain that she's rating a \"7/10\". BP also higher than normal today- 143/90. Infusion RN asked to recheck.     Chief Complaint   Patient presents with    Blood Draw     Labs drawn by Vascular Access RN in Lab via Left Arm VPT.      Macarena Ward RN    "

## 2024-10-31 ENCOUNTER — TELEPHONE (OUTPATIENT)
Dept: ONCOLOGY | Facility: CLINIC | Age: 49
End: 2024-10-31
Payer: MEDICARE

## 2024-10-31 NOTE — ORAL ONC MGMT
Oral Chemotherapy Monitoring Program  Lab Follow Up    Reviewed lab results from 10/30/24.        7/23/2024    12:00 PM 7/31/2024     1:00 PM 8/28/2024     1:00 PM 9/12/2024     2:00 PM 9/13/2024     2:00 PM 10/22/2024    11:00 AM 10/31/2024    10:00 AM   ORAL CHEMOTHERAPY   Assessment Type Monthly Follow up Refill Refill Left Voicemail Refill Monthly Follow up Lab Monitoring;Left Voicemail   Diagnosis Code Breast Cancer Breast Cancer Breast Cancer Breast Cancer Breast Cancer Breast Cancer Breast Cancer   Providers Dr. Dimitrios Plunkett   [facility]   Is this patient followed by the [facility] OC team? No No No No  No No   Drug Name Verzenio (abemaciclib) Verzenio (abemaciclib) Verzenio (abemaciclib) Verzenio (abemaciclib) Verzenio (abemaciclib) Verzenio (abemaciclib) Verzenio (abemaciclib)   Dose 100 mg 100 mg 100 mg 100 mg  100 mg 100 mg   Current Schedule BID BID BID BID  BID BID   Cycle Details Continuous Continuous Continuous Continuous  Continuous Continuous   Doses missed in last 2 weeks 4         Adherence Assessment Non-adherent         Reason for Non-adherence Patient forgets         Diarrhea Grade 2         Pharmacist Intervention(diarrhea) No         Fatigue Grade 3         Pharmacist Intervention(fatigue) Yes         Intervention(s) Patient education             Labs:  _  Result Component Current Result Ref Range   Sodium 141 (10/30/2024) 135 - 145 mmol/L     _  Result Component Current Result Ref Range   Potassium 3.2 (L) (10/30/2024) 3.4 - 5.3 mmol/L     _  Result Component Current Result Ref Range   Calcium 9.1 (10/30/2024) 8.8 - 10.4 mg/dL     No results found for Mag within last 30 days.     No results found for Phos within last 30 days.     _  Result Component Current Result Ref Range   Albumin 3.9 (10/30/2024) 3.5 - 5.2 g/dL     _  Result Component Current Result Ref  Range   Urea Nitrogen 9.4 (10/30/2024) 6.0 - 20.0 mg/dL     _  Result Component Current Result Ref Range   Creatinine 0.95 (10/30/2024) 0.51 - 0.95 mg/dL     _  Result Component Current Result Ref Range   AST 20 (10/30/2024) 0 - 45 U/L     _  Result Component Current Result Ref Range   ALT 14 (10/30/2024) 0 - 50 U/L     _  Result Component Current Result Ref Range   Bilirubin Total 0.2 (10/30/2024) <=1.2 mg/dL     _  Result Component Current Result Ref Range   WBC Count 3.0 (L) (10/30/2024) 4.0 - 11.0 10e3/uL     _  Result Component Current Result Ref Range   Hemoglobin 10.0 (L) (10/30/2024) 11.7 - 15.7 g/dL     _  Result Component Current Result Ref Range   Platelet Count 164 (10/30/2024) 150 - 450 10e3/uL     No results found for ANC within last 30 days.     _  Result Component Current Result Ref Range   Absolute Neutrophils 1.5 (L) (10/30/2024) 1.6 - 8.3 10e3/uL        Assessment & Plan:  Results are concerning for Grade 1 neutropenia and grade 1 hypokalemia, no interventions needed at this time.     Left voicemail to check for adherence and side effects, will send a [employer] message encouraging potassium rich foods and to call with any questions or concerns.    Confirmed last rx was sold 10/17/24.    Follow-Up:  Next labs/fulvestrant 11/27/24      Nadya Broderick, PharmD, BCPS  Oral Chemotherapy Monitoring Program  [facility]  [phone]

## 2024-11-03 DIAGNOSIS — C50.812 MALIGNANT NEOPLASM OF OVERLAPPING SITES OF LEFT BREAST IN FEMALE, ESTROGEN RECEPTOR POSITIVE (H): ICD-10-CM

## 2024-11-03 DIAGNOSIS — Z17.0 MALIGNANT NEOPLASM OF OVERLAPPING SITES OF LEFT BREAST IN FEMALE, ESTROGEN RECEPTOR POSITIVE (H): ICD-10-CM

## 2024-11-03 DIAGNOSIS — C79.51 CARCINOMA OF LEFT BREAST METASTATIC TO BONE (H): Primary | ICD-10-CM

## 2024-11-03 DIAGNOSIS — C50.912 CARCINOMA OF LEFT BREAST METASTATIC TO BONE (H): Primary | ICD-10-CM

## 2024-11-03 RX ORDER — LAMOTRIGINE 25 MG/1
500 TABLET ORAL ONCE
OUTPATIENT
Start: 2024-11-03 | End: 2024-11-26

## 2024-11-07 ENCOUNTER — TELEPHONE (OUTPATIENT)
Dept: ONCOLOGY | Facility: CLINIC | Age: 49
End: 2024-11-07
Payer: MEDICARE

## 2024-11-07 DIAGNOSIS — C50.812 MALIGNANT NEOPLASM OF OVERLAPPING SITES OF LEFT BREAST IN FEMALE, ESTROGEN RECEPTOR POSITIVE (H): ICD-10-CM

## 2024-11-07 DIAGNOSIS — Z17.0 MALIGNANT NEOPLASM OF OVERLAPPING SITES OF LEFT BREAST IN FEMALE, ESTROGEN RECEPTOR POSITIVE (H): ICD-10-CM

## 2024-11-07 DIAGNOSIS — C50.912 CARCINOMA OF LEFT BREAST METASTATIC TO BONE (H): Primary | ICD-10-CM

## 2024-11-07 DIAGNOSIS — C79.51 CARCINOMA OF LEFT BREAST METASTATIC TO BONE (H): Primary | ICD-10-CM

## 2024-11-07 NOTE — TELEPHONE ENCOUNTER
Oral Chemotherapy Monitoring Program    Subjective/Objective:  Teresa Sanderson is a 49 year old female contacted by phone for a follow-up visit for oral chemotherapy.  Teresa confirmed taking Verzenio 1 tablet by mouth 2 times daily. She missed  around 5 days of taking her medication in the last month.   The biggest concern and adverse effect is fatigue and very low energy level. She denies any nausea or vomiting.  Had some episodes of diarrhea and constipation but very rare and not a concern. Teresa stated that her appetite is good but she lost around 5 pounds.  Her weight was 240 Ib and now it's around 230-236 Ib. She doesn't have any other concerns or side effects.         8/28/2024     1:00 PM 9/12/2024     2:00 PM 9/13/2024     2:00 PM 10/22/2024    11:00 AM 10/31/2024    10:00 AM 11/7/2024     9:00 AM 11/7/2024     4:00 PM   ORAL CHEMOTHERAPY   Assessment Type Refill Left Voicemail Refill Monthly Follow up Lab Monitoring;Left Voicemail Refill Monthly Follow up   Diagnosis Code Breast Cancer Breast Cancer Breast Cancer Breast Cancer Breast Cancer Breast Cancer Breast Cancer   Providers Dr. Dimitrios Plunkett   [facility]   Is this patient followed by the [facility] OC team? No No  No No No No   Drug Name Verzenio (abemaciclib) Verzenio (abemaciclib) Verzenio (abemaciclib) Verzenio (abemaciclib) Verzenio (abemaciclib) Verzenio (abemaciclib) Verzenio (abemaciclib)   Dose 100 mg 100 mg  100 mg 100 mg 100 mg 100 mg   Current Schedule BID BID  BID BID BID BID   Cycle Details Continuous Continuous  Continuous Continuous Continuous Continuous   Adverse Effects       Fatigue   Fatigue       Grade 2   Pharmacist Intervention(fatigue)       No       Last PHQ-2 Score on record:       7/19/2024     1:50 PM 2/23/2024     2:48 PM   PHQ-2 ( 1999 [employer])   Q1: Little interest or pleasure in  "doing things 0 2   Q2: Feeling down, depressed or hopeless 1 1   PHQ-2 Score 1 3   PHQ-2 Total Score (12-17 Years)- Positive if 3 or more points; Administer PHQ-A if positive 1 3       Vitals:  BP:   BP Readings from Last 1 Encounters:   10/30/24 123/87     Wt Readings from Last 1 Encounters:   10/30/24 107.1 kg (236 lb 1.8 oz)     Estimated body surface area is 2.32 meters squared as calculated from the following:    Height as of 10/29/24: 1.803 m (5' 10.98\").    Weight as of 10/30/24: 107.1 kg (236 lb 1.8 oz).    Labs:  _  Result Component Current Result Ref Range   Sodium 141 (10/30/2024) 135 - 145 mmol/L     _  Result Component Current Result Ref Range   Potassium 3.2 (L) (10/30/2024) 3.4 - 5.3 mmol/L     _  Result Component Current Result Ref Range   Calcium 9.1 (10/30/2024) 8.8 - 10.4 mg/dL     No results found for Mag within last 30 days.     No results found for Phos within last 30 days.     _  Result Component Current Result Ref Range   Albumin 3.9 (10/30/2024) 3.5 - 5.2 g/dL     _  Result Component Current Result Ref Range   Urea Nitrogen 9.4 (10/30/2024) 6.0 - 20.0 mg/dL     _  Result Component Current Result Ref Range   Creatinine 0.95 (10/30/2024) 0.51 - 0.95 mg/dL     _  Result Component Current Result Ref Range   AST 20 (10/30/2024) 0 - 45 U/L     _  Result Component Current Result Ref Range   ALT 14 (10/30/2024) 0 - 50 U/L     _  Result Component Current Result Ref Range   Bilirubin Total 0.2 (10/30/2024) <=1.2 mg/dL     _  Result Component Current Result Ref Range   WBC Count 3.0 (L) (10/30/2024) 4.0 - 11.0 10e3/uL     _  Result Component Current Result Ref Range   Hemoglobin 10.0 (L) (10/30/2024) 11.7 - 15.7 g/dL     _  Result Component Current Result Ref Range   Platelet Count 164 (10/30/2024) 150 - 450 10e3/uL     No results found for ANC within last 30 days.     _  Result Component Current Result Ref Range   Absolute Neutrophils 1.5 (L) (10/30/2024) 1.6 - 8.3 10e3/uL    "       Assessment/Plan:  Teresa has a low energy level and fatigue.  She doesn't have any other adverse effects.  No interventions needed for now - continue monitoring.    Follow-Up:  Lab draw with infusions 1125    Linda Stafford  Pharmacy Intern  Oral Chemotherapy Monitoring Program  [facility]  [phone]

## 2024-11-08 RX ORDER — PALBOCICLIB 125 MG/1
TABLET, FILM COATED ORAL
Qty: 21 TABLET | Refills: 0 | OUTPATIENT
Start: 2024-11-08

## 2024-11-11 ENCOUNTER — TELEPHONE (OUTPATIENT)
Dept: GASTROENTEROLOGY | Facility: CLINIC | Age: 49
End: 2024-11-11
Payer: MEDICARE

## 2024-11-11 NOTE — TELEPHONE ENCOUNTER
"Endoscopy Scheduling Screen    Have you had any respiratory illness or flu-like symptoms in the last 10 days?  No    What is your communication preference for Instructions and/or Bowel Prep?   MyChart    What insurance is in the chart?  Other:  Medicare, Medicaid    Ordering/Referring Provider: Jahaira Plunkett MD   (If ordering provider performs procedure, schedule with ordering provider unless otherwise instructed. )    BMI: Estimated body mass index is 32.95 kg/m  as calculated from the following:    Height as of 10/29/24: 1.803 m (5' 10.98\").    Weight as of 10/30/24: 107.1 kg (236 lb 1.8 oz).     Sedation Ordered  MAC/deep sedation.   BMI<= 45 45 < BMI <= 48 48 < BMI < = 50  BMI > 50   No Restrictions No [facility]  No [facility]  [facility] with exceptions Hospital Only OR Only       Do you have a history of malignant hyperthermia?  No    (Females) Are you currently pregnant?   No     Have you been diagnosed or told you have pulmonary hypertension?   No    Do you have an LVAD?  No    Have you been told you have moderate to severe sleep apnea?  No.    Have you been told you have COPD, asthma, or any other lung disease?  No    Do you have any heart conditions?  No     Have you ever had or are you waiting for an organ transplant?  No. Continue scheduling, no site restrictions.    Have you had a stroke or transient ischemic attack (TIA aka \"mini stroke\" in the last 6 months?   No    Have you been diagnosed with or been told you have cirrhosis of the liver?   No.    Are you currently on dialysis?   No    Do you need assistance transferring?   No    BMI: Estimated body mass index is 32.95 kg/m  as calculated from the following:    Height as of 10/29/24: 1.803 m (5' 10.98\").    Weight as of 10/30/24: 107.1 kg (236 lb 1.8 oz).     Is patients BMI > 40 and scheduling location [facility]?  No    Do you take an injectable or oral medication for weight loss or diabetes (excluding insulin)?  No    Do you take the medication " Naltrexone?  No    Do you take blood thinners?  Yes, you must contact your prescribing provider for directions on holding or bridging with a different medication.       Prep   Are you currently on dialysis or do you have chronic kidney disease?  No    Do you have a diagnosis of diabetes?  No    Do you have a diagnosis of cystic fibrosis (CF)?  No    On a regular basis do you go 3 -5 days between bowel movements?  Yes (Extended Prep)    BMI > 40?  No    Preferred Pharmacy:    [id number] - [place] [address] [address]  [address] [facility] [address]  Phone: [phone] Fax: [phone]      Final Scheduling Details     Procedure scheduled  Upper endoscopy (EGD)    Surgeon:  Mg     Date of procedure:  03/07/2025     Pre-OP / PAC:   No - Not required for this site.    Location  [facility] - [facility] - Patient preference.    Sedation   MAC/Deep Sedation - Per order.      Patient Reminders:   You will receive a call from a Nurse to review instructions and health history.  This assessment must be completed prior to your procedure.  Failure to complete the Nurse assessment may result in the procedure being cancelled.      On the day of your procedure, please designate an adult(s) who can drive you home stay with you for the next 24 hours. The medicines used in the exam will make you sleepy. You will not be able to drive.      You cannot take public transportation, ride share services, or non-medical taxi service without a responsible caregiver.  Medical transport services are allowed with the requirement that a responsible caregiver will receive you at your destination.  We require that drivers and caregivers are confirmed prior to your procedure.

## 2024-11-21 ENCOUNTER — MYC REFILL (OUTPATIENT)
Dept: PSYCHIATRY | Facility: CLINIC | Age: 49
End: 2024-11-21
Payer: MEDICARE

## 2024-11-21 ENCOUNTER — MYC REFILL (OUTPATIENT)
Dept: ONCOLOGY | Facility: CLINIC | Age: 49
End: 2024-11-21
Payer: MEDICARE

## 2024-11-21 ENCOUNTER — VIRTUAL VISIT (OUTPATIENT)
Dept: PSYCHIATRY | Facility: CLINIC | Age: 49
End: 2024-11-21
Attending: PSYCHIATRY & NEUROLOGY
Payer: MEDICARE

## 2024-11-21 ENCOUNTER — MYC REFILL (OUTPATIENT)
Dept: PALLIATIVE CARE | Facility: CLINIC | Age: 49
End: 2024-11-21
Payer: MEDICARE

## 2024-11-21 DIAGNOSIS — F41.1 GAD (GENERALIZED ANXIETY DISORDER): ICD-10-CM

## 2024-11-21 DIAGNOSIS — F25.0 SCHIZOAFFECTIVE DISORDER, BIPOLAR TYPE (H): ICD-10-CM

## 2024-11-21 DIAGNOSIS — M62.838 MUSCLE SPASM: ICD-10-CM

## 2024-11-21 DIAGNOSIS — Z17.0 MALIGNANT NEOPLASM OF OVERLAPPING SITES OF LEFT BREAST IN FEMALE, ESTROGEN RECEPTOR POSITIVE (H): ICD-10-CM

## 2024-11-21 DIAGNOSIS — F25.0 SCHIZOAFFECTIVE DISORDER, BIPOLAR TYPE (H): Primary | ICD-10-CM

## 2024-11-21 DIAGNOSIS — C50.812 MALIGNANT NEOPLASM OF OVERLAPPING SITES OF LEFT BREAST IN FEMALE, ESTROGEN RECEPTOR POSITIVE (H): ICD-10-CM

## 2024-11-21 DIAGNOSIS — G89.3 CANCER ASSOCIATED PAIN: ICD-10-CM

## 2024-11-21 DIAGNOSIS — Z98.890 BACK PAIN WITH HISTORY OF SPINAL SURGERY: ICD-10-CM

## 2024-11-21 DIAGNOSIS — I82.432 ACUTE DEEP VEIN THROMBOSIS (DVT) OF POPLITEAL VEIN OF LEFT LOWER EXTREMITY (H): ICD-10-CM

## 2024-11-21 DIAGNOSIS — M54.9 BACK PAIN WITH HISTORY OF SPINAL SURGERY: ICD-10-CM

## 2024-11-21 PROCEDURE — G2211 COMPLEX E/M VISIT ADD ON: HCPCS | Mod: 95

## 2024-11-21 PROCEDURE — 99214 OFFICE O/P EST MOD 30 MIN: CPT | Mod: 95

## 2024-11-21 PROCEDURE — 90838 PSYTX W PT W E/M 60 MIN: CPT | Mod: 4UV

## 2024-11-21 RX ORDER — QUETIAPINE FUMARATE 50 MG/1
25-50 TABLET, FILM COATED ORAL 2 TIMES DAILY PRN
Qty: 60 TABLET | Refills: 0 | Status: SHIPPED | OUTPATIENT
Start: 2024-11-21

## 2024-11-21 RX ORDER — SERTRALINE HYDROCHLORIDE 100 MG/1
50 TABLET, FILM COATED ORAL DAILY
Qty: 30 TABLET | Refills: 1 | Status: SHIPPED | OUTPATIENT
Start: 2024-11-21

## 2024-11-21 RX ORDER — MIRTAZAPINE 15 MG/1
15 TABLET, FILM COATED ORAL AT BEDTIME
Qty: 30 TABLET | Refills: 1 | Status: SHIPPED | OUTPATIENT
Start: 2024-11-21

## 2024-11-21 RX ORDER — QUETIAPINE FUMARATE 100 MG/1
TABLET, FILM COATED ORAL
Qty: 30 TABLET | Refills: 1 | Status: SHIPPED | OUTPATIENT
Start: 2024-11-21

## 2024-11-21 RX ORDER — VITAMIN B COMPLEX
25 TABLET ORAL DAILY
Qty: 90 TABLET | Refills: 3 | Status: SHIPPED | OUTPATIENT
Start: 2024-11-21

## 2024-11-21 RX ORDER — QUETIAPINE FUMARATE 400 MG/1
400 TABLET, FILM COATED ORAL AT BEDTIME
Qty: 30 TABLET | Refills: 2 | Status: SHIPPED | OUTPATIENT
Start: 2024-11-21 | End: 2024-11-21

## 2024-11-21 RX ORDER — QUETIAPINE FUMARATE 400 MG/1
TABLET, FILM COATED ORAL
Qty: 30 TABLET | Refills: 1 | Status: SHIPPED | OUTPATIENT
Start: 2024-11-21

## 2024-11-21 RX ORDER — OXYCODONE HYDROCHLORIDE 5 MG/1
5-10 TABLET ORAL EVERY 4 HOURS PRN
Qty: 200 TABLET | Refills: 0 | Status: SHIPPED | OUTPATIENT
Start: 2024-11-21

## 2024-11-21 RX ORDER — METHOCARBAMOL 500 MG/1
TABLET, FILM COATED ORAL 3 TIMES DAILY PRN
Qty: 210 TABLET | Refills: 2 | Status: SHIPPED | OUTPATIENT
Start: 2024-11-21

## 2024-11-21 RX ORDER — QUETIAPINE FUMARATE 50 MG/1
25-50 TABLET, FILM COATED ORAL 2 TIMES DAILY PRN
Qty: 60 TABLET | Refills: 0 | Status: SHIPPED | OUTPATIENT
Start: 2024-11-21 | End: 2024-11-21

## 2024-11-21 ASSESSMENT — PAIN SCALES - GENERAL: PAINLEVEL_OUTOF10: EXTREME PAIN (8)

## 2024-11-21 ASSESSMENT — PATIENT HEALTH QUESTIONNAIRE - PHQ9: SUM OF ALL RESPONSES TO PHQ QUESTIONS 1-9: 14

## 2024-11-21 NOTE — CONFIDENTIAL NOTE
Methocarbamol Refill   Last prescribing provider: Alee De Los Santos     Last clinic visit date: 10/1/24 Dr Plunkett     Recommendations for requested medication (if none, N/A): Copied from chart note   She is currently taking oxycodone 2 tabs in the a.m. 2 in the afternoon, 2 before bed, and sometimes 2 in the early a.m. She also takes Robaxin, and gabapentin. Ongoing follow up with palliative medicine.     Any other pertinent information (if none, N/A): N/A    Refilled: Y/N, if NO, why?

## 2024-11-21 NOTE — CONFIDENTIAL NOTE
Xarelto Refill   Last prescribing provider: Alee De Los Santos     Last clinic visit date: 10/1/24 Dr Plunkett     Recommendations for requested medication (if none, N/A): Copied from chart note 10/2/24 Yolie Cunha   Spoke with patient and daughter on speaker phone to discuss US results. She has an increased thrombus left popliteal vein and new thrombus left femoral vein.  Dr Plunkett is recommending she restart the loading dose Xarelto;15 mg twice a day x 21 days, then resume 20 mg daily.  Dr. Plunkett has orders a repeat US for one month. Reviewed the importance of taking the blood thinner consistently. She verbalized understanding and agreement with plan. She will  new prescription of Xarelto and start right away.       Any other pertinent information (if none, N/A): N/A    Refilled: Y/N, if NO, why?

## 2024-11-21 NOTE — CONFIDENTIAL NOTE
Vitamin D3 Refill   Last prescribing provider: Rita Maza     Last clinic visit date: 10/1/24 DR Plunkett     Recommendations for requested medication (if none, N/A): Copied from chart note   Vitamin D3 (CHOLECALCIFEROL) 25 mcg (1000 units) tablet Take 1 tablet (25 mcg) by mouth daily 90 tablet 3     Any other pertinent information (if none, N/A): N/A    Refilled: Y/N, if NO, why?

## 2024-11-21 NOTE — TELEPHONE ENCOUNTER
Last seen: 07/19/2024  RTC: 4 weeks  Cancel: 1  No-show: 1  Next appt: None    Medication requested:   Pending Prescriptions:                       Disp   Refills    QUEtiapine (SEROQUEL) 400 MG tablet       30 tab*2            Sig: Take 1 tablet (400 mg) by mouth at bedtime.    QUEtiapine (SEROQUEL) 50 MG tablet        60 tab*2            Sig: Take 0.5-1 tablets (25-50 mg) by mouth 2 times           daily as needed (for sleep or acute agitation).    From chart note:    continue Seroquel 400mg at bedtime   - continue Seroquel 25-50mg BID PRN (patient takes 50mg BID PRN)    Medication unable to be refilled by RN due to criteria not met as indicated.                 []Eligibility - not seen in the last year              [x]Supervision - no future appointment              [x]Compliance - no shows, cancellations or lapse in therapy              []Verification - order discrepancy              []Controlled medication              []Medication not included in policy              []90-day supply request              []Other:

## 2024-11-21 NOTE — TELEPHONE ENCOUNTER
Received VODECLICt message from patient requesting refill of oxycodone.     Last refill: 10/24/24  Last office visit: 10/22/24  Scheduled for follow up 1/21/24     Will route request to MD/ for review.     Reviewed [place]  Report.

## 2024-11-21 NOTE — PATIENT INSTRUCTIONS
**For crisis resources, please see the information at the end of this document**   Patient Education    Thank you for coming to the [facility] [place] [facility].      -Contact [facility] at [phone] to schedule your therapy appointment   -Increase Quetiapine to 500mg at bedtime   -Start Mirtazapine 15mg for anxiety and depression   -Decrease Sertraline to 50mg     Lab Testing:  If you had lab testing today and your results are reassuring or normal they will be mailed to you or sent through [employer] within 7 days. If the lab tests need quick action we will call you with the results. The phone number we will call with results is # [phone]. If this is not the best number please call our clinic and change the number.     Medication Refills:  If you need any refills please call your pharmacy and they will contact us. Our fax number for refills is [phone].   Three business days of notice are needed for general medication refill requests.   Five business days of notice are needed for controlled substance refill requests.   If you need to change to a different pharmacy, please contact the new pharmacy directly. The new pharmacy will help you get your medications transferred.     Contact Us:  Please call [phone] during business hours (8-5:00 M-F).   If you have medication related questions after clinic hours, or on the weekend, please call [phone].     Financial Assistance [phone]   Medical Records [phone]       MENTAL HEALTH CRISIS RESOURCES:  For a emergency help, please call 911 or go to the nearest Emergency Department.     Emergency Walk-In Options:   Em[facility] Unit @ [facility] ([place]): [phone] - Specialized mental health emergency area designed to be calming  [facility] ([place]): [phone]  [facility] ([place]): [phone]  [facility] ([place]):  [phone]    [facility] Crisis Information:   [place]: [phone]  [place]: [phone]  [place] ([place]) - Adult: [phone]     Child: [phone]  [place] - Adult: [phone]     Child: [phone]  [place]: [phone]  List of all [facility] resources:   https://[place].gov/dhs/people-we-serve/adults/health-care/mental-health/resources/crisis-contacts.jsp    National Crisis Information:   Crisis Text Line: Text  [place]  to 564887  Suicide & Crisis Lifeline: 988  National Suicide Prevention Lifeline: 0-890-027-TALK (1-223.125.6806)       For online chat options, visit https://suicidepreventionlifeline.org/chat/  Poison Control Center: 1-911.170.5553  Trans Lifeline: 1-723.599.4788 - Hotline for transgender people of all ages  [employer]: 3-666-204-6726 - Hotline for LGBT youth     For Non-Emergency Support:   Fast Tracker: Mental Health & Substance Use Disorder Resources -   https://www.[employer].org/

## 2024-11-21 NOTE — NURSING NOTE
Current patient location: [address]   [facility] [address]    Is the patient currently in the state of [place]? YES    Visit mode:VIDEO    If the visit is dropped, the patient can be reconnected by:VIDEO VISIT: Text to cell phone:   Telephone Information:   Mobile [phone]       Will anyone else be joining the visit? NO  (If patient encounters technical issues they should call [phone]    Are changes needed to the allergy or medication list? Pt stated no changes to allergies and Pt stated no med changes    Are refills needed on medications prescribed by this physician? Discuss with provider    Rooming Documentation:  Questionnaire(s) completed  only completed PHQ9 due to time constraints    Reason for visit: LUISA LOWRYF

## 2024-11-21 NOTE — PROGRESS NOTES
Virtual Visit Details    Type of service:  Video Visit     Originating Location (pt. Location): Home    Distant Location (provider location):  On-site  Platform used for Video Visit: [facility]  MEDICAL PROGRESS NOTE     CARE TEAM:    PCP- Lavonne Frazier  Therapist- None   Oncology- Margot Plunkett MD   Palliative care: Ayanna Frank DO       Teresa is a 49 year old who uses the pronouns she, her, hers.      Assessment     Schizoaffective disorder, bipolar type, most recent episode depressed      JENNIFFER with panic attacks   Insomnia, unspecified   R/o PTSD    Metastatic breast cancer (dx 12/2020) s/p RT to lumbar spine and RFA /kyphoplasty L4   Bone metastases / low back pain with LLE sciatica   H/o tobacco use   H/o DVTs   FELTON 2/2 abemaciclib, improved       Teresa Sanderson is a 48 year old female with past psychiatric diagnosis of schizoaffective disorder, bipolar type and medical history pertinent for metastatic breast cancer (IDC with surrounding DCIS, grade 3, ER+ 90%, and SC+ 75%, PET CT with metastases to pelvis). She was referred to psychiatry clinic by her oncologist following diagnosis of metastatic breast cancer in 12/2020. Daughter is her PCA. She completed her radiation to left acetabulum and SI on 7/18/2024. Currently receiving Fulvestrant + abemaciclib.     Today, Teresa's mood appears anxious. She has episodes of what sound like panic attacks vs. Severe anxiety vs. Trauma-related disorder  (sense of impending doom, sweating, palpitations, racing thoughts, dissociation). Unclear of duration. She has anhedonia and poor sleep suggesting ongoing depressive episode of her schizoaffective disorder complicated by anxiety from dealing with pain from bone metastatic of breast cancer, housing instability, stress from feeling like a burden to her daughter and her grandchildren. She was started on  ramelteon 8mg by internal medicine after missing appointments with our clinic. She agreed with increasing scheduled quetiapine to match how she has been taking (400mg + 200mg most nights with 50bid PRN available). She didn't find gabapentin helpful for anxiety relief. Discussed starting Mirtazapine to help with mood, anxiety and sleep, and she agreed with plan. Will lower Sertraline due to risks of additive serotonergic effects. Patient was informed about serotonin toxicity symptoms. Motivational interview used for engagement in psychiatric care and acknowledged that she has many appointments and can be hard to stay on top of them. She has not established care with psychotherapist and phone number was provided again and she was encouraged to follow up.     Psychotropic Drug Interactions:  [PSYCHCLINICDDI]  ADDITIVE SEROTONERGIC: Mirtzapine, Sertraline  ADDITIVE SEDATION: Oxycodone, Robaxin, Seroquel, Mirtazapine  Management: Routine Monitoring     [place] was [place] today:  taking controlled substances as prescribed    Risk Statements:   Treatment Risk- Risks, benefits, alternatives and potential adverse effects have been discussed and are understood.   Safety Risk-Cleaster did not appear to be an imminent safety risk to self or others.     Plan     1) Medications:   -Increase Quetiapine to 500mg at bedtime   -Start Mirtazapine 15mg at bedtime   -Decrease Sertraline to 50mg     Future considerations:   -clonidine for anxiety     Prescribed outside of psychiatry clinic:   - Ramelteon 8mg   - Gabapentin 600 mg TID   - Robaxin 500 mg TID PRN for muscle spasms  - Rivaroxaban 20mg daily  - Oxycodone 5-10mg Q4H PRN   - Abemaciclib 100mg BID    - Fulvestrant injection 500mg   - Vitamin D3 25mcg daily       2) Psychotherapy: phone number given to reestablish care      3) Next due:  Labs-   EKG- Routine monitoring is not indicated for current psychotropic medication regimen   Rating scales- AIMS: due at next in-person  appointment    4) Referrals: Therapy- health psychology referral made in 7/2024 and lost follow up     5) Other: none    6) Follow-up: Return to clinic in 4-6 weeks       Pertinent Background                                                   [most recent eval 7/19/24]   Teresa first experienced rapid mood shifts since childhood. She was diagnosed with bipolar disorder. Received mental health care in [place] and took Seroquel 100mg TID for years. In 1999 mood worsened from baseline requiring acute hospitalization at [facility] in [place] followed by day treatment. During this treatment her diagnosis changed from bipolar disorder to schizoaffective disorder. She had another hospitalization in 2020 for what appears to be a full manic episode. At that time, stressors include helping daughter who was a victim of domestic violence.  Around that time, she had severe back pain and was diagnosed with metastatic breast cancer (L4, L5, lt illiac, paraspinal) was diagnosed in 12/2020. Zoloft started after the cancer diagnosis. Belinda, her daughter and her 2 grandchildren moved to [place] in 2021. Seroquel 100mg TID was stopped August 2021 (not helping), doxepin and Ambien tried for sleep-neither helped. Lost her son to accidental overdose 3/25/2022.  She has had 7-8 hospitalizations, all in [place].     Medically, 1/2021 was started on Ibrance, zoladex, and anastrozole; 5/17/21- s/p radiofrequency ablation, keloplasty/ segmental plasty of L4; 7/2021- showing complete response to treatment.     When she was referred to our clinic, symptoms pertinent for low energy, anhedonia, mood fluctuations (mostly low), worry/ anxiety, panic attacks, commanding auditory hallucinations. Denied visual hallucinations and paranoia.       Pertinent items include:  suicidal ideation, loss of her son to accidental overdose, auditory hallucinations, tom, mutiple psychotropic trials, trauma hx, violent behavior, multiple psych hosps, cocaine use in 2019, metastatic  "breast cancer, history of med non-adherence with social stressors       Subjective     Since last visit:   -slept through appointments so missed the previous ones  -quetiapine is not helping and has not been able to sleep   -taking 400mg + 100mg PRN on some nights and would wake up to take another 100mg   -started to notice poor sleep couple months ago   -denies decreased need of sleep   -tries to sleep during the day but cannot fall asleep  -get out of bed later in the afternoon and can't get anything done   -helps take care of grandchildren, pick them up from bus stop most days   -doesn't need to wake up to take oxycodone. Takes it before bedtime.   -stressed about her cancer prognosis   -have always been a worrier before having cancer   -has episodes of racing thoughts, trouble breathing, sweating, headaches. \"happens all the time\"     -thinks body is breaking down, feel detached from her body   -can't concentrate in conversations and was told she talks about other topics and confused others   -gabapentin doesn't help with anxiety   -no suicidal thoughts   -some auditory hallucinations of her own voice, answering her thoughts back in her own voice   -no visual hallucinations   -no paranoia      Recent Psych Symptoms:   Depression:  depressed mood, low energy, insomnia, appetite changes, overwhelmed, and mood dysregulation  Elevated:  none - less prevalent since last appointment, symptoms now more associated with anxiety and dysregulation  Psychosis:  auditory hallucinations    Anxiety:  feeling fearful and nervous/overwhelmed  Trauma Related:   Medical trauma/stressors, anxiety increased when thinking about medical procedures and results  Insomnia:  Yes: intermittent difficulty with both initiation and maintenance due to worries at night - somewhat improve with increased Seroquel  Other:  No    Current Social History:  Financial/occupational: on disability for finances currently  Living situation (partner, " "children, pets, etc): Rents an apartment, son has a room with her but doesn't stay there often, 1 cat (\"Toejoe\")  Social/spiritual support: Prayer is important, best friend in [place], sister in [place], son  Feels safe at home: Yes    Pertinent Substance Use:   Alcohol: Rarely, maybe once every other month   Cannabis: Infrequently - 1-2x per month for refractory pain and anxiety, smoked a joint that got rolled up for about a month   Tobacco: Yes: 2-3 cigarettes / day   Caffeine:  Yes: drinks coffee every now and then in the morning, drink tea a cup   Opioids: Yes: prescribed   Narcan Kit current: Yes  Other substances: none    Medical Review of Systems:   Lightheadedness/orthostasis: None  Headaches: Very rare tension headaches  GI: none  Sexual health concerns: None    A comprehensive review of systems was performed and is negative other than noted above.    Contraception: \"tubes are tied\"     Mental Status Exam     Alertness: alert  and oriented  Appearance: casually groomed  Behavior/Demeanor:  Less agitated , with good  eye contact   Speech: increased rate and increased volume when feeling most distressed  Language: intact and no problems  Psychomotor:  Persistent rocking while seated  Mood: depressed, anxious, worried, and fearful  Affect:  anxious ; congruent to: mood- yes, content- yes  Thought Process/Associations: unremarkable  Thought Content:  Reports none;  Denies suicidal & violent ideation and delusions and paranoid ideation  Perception:  Reports auditory hallucinations without commands [details in history];  Denies visual hallucinations and paranoia  Insight: fair - open to review and discussion of symptoms across diagnosis, but some difficulty with specific identification of symptoms potentially related to schizoaffective disorder  Judgment: fair  Cognition: does  appear grossly intact; formal cognitive testing was not done  Gait and Station: N/A (telehealth)     Past Psych Med Trials      " Medication Max Dose (mg) Dates / Duration Helpful? DC Reason / Adverse Effects?   Seroquel 400mg  yes Max dose of 500mg via 400mg QHS plus 50mg BID PRN   Zoloft 450mg  yes    gabapentin 600mg TID  ?    olanzapine 5mg BID      Depakote   no    lithium   no    Invega    Only brief trial   Ambien          Vitals   There were no vitals taken for this visit.  Pulse Readings from Last 3 Encounters:   10/30/24 71   10/29/24 66   10/01/24 82     Wt Readings from Last 3 Encounters:   10/30/24 107.1 kg (236 lb 1.8 oz)   10/29/24 106.2 kg (234 lb 3.2 oz)   10/01/24 107.1 kg (236 lb 3.2 oz)     BP Readings from Last 3 Encounters:   10/30/24 123/87   10/29/24 117/81   10/01/24 123/80        Medical History     ALLERGIES: Contrast dye    Patient Active Problem List   Diagnosis    Breast mass    Spine metastasis    Urinary tract infection with hematuria    Malignant neoplasm of overlapping sites of left breast in female, estrogen receptor positive (H)    Carcinoma of left breast metastatic to bone (H)    Metastasis to bone (H)    Pain of right lower leg    Malignant neoplasm of female breast, unspecified estrogen receptor status, unspecified laterality, unspecified site of breast (H)    Schizoaffective disorder, bipolar type (H)    Insomnia, unspecified type    Cancer associated pain    Malignant neoplasm metastatic to bone (H)        Medications     Current Outpatient Medications   Medication Sig Dispense Refill    mirtazapine (REMERON) 15 MG tablet Take 1 tablet (15 mg) by mouth at bedtime. 30 tablet 1    QUEtiapine (SEROQUEL) 100 MG tablet Tale 1 tablet (100mg) with 1 tablet (400mg) for total of 500mg by mouth at bedtime 30 tablet 1    QUEtiapine (SEROQUEL) 400 MG tablet Take 1 tablet (400mg) with 1 tablet (100mg) for total of 500mg by mouth at bedtime 30 tablet 1    QUEtiapine (SEROQUEL) 50 MG tablet Take 0.5-1 tablets (25-50 mg) by mouth 2 times daily as needed (for sleep or anxiety attacks). 60 tablet 0    sertraline  (ZOLOFT) 100 MG tablet Take 0.5 tablets (50 mg) by mouth daily. 30 tablet 1    [START ON 11/25/2024] abemaciclib (VERZENIO) 100 MG tablet Take 1 tablet (100 mg) by mouth 2 times daily for 28 days 56 tablet 0    abemaciclib (VERZENIO) 100 MG tablet Take 1 tablet (100 mg) by mouth 2 times daily for 28 days 56 tablet 0    gabapentin (NEURONTIN) 300 MG capsule Take 2 capsules in the morning, 2 capsules in afternoon, and 3 capsules at bedtime. 210 capsule 1    methocarbamol (ROBAXIN) 500 MG tablet Take 2-3 tablets (1,000-1,500 mg) by mouth 3 times daily as needed for muscle spasms. 210 tablet 2    methylPREDNISolone (MEDROL) 32 MG tablet 12 hours prior to scan and 2 hours prior to scan 2 tablet 3    naloxone (NARCAN) 4 MG/0.1ML nasal spray Spray 1 spray (4 mg) into one nostril alternating nostrils once as needed for opioid reversal every 2-3 minutes until assistance arrives (Patient not taking: Reported on 10/29/2024) 0.2 mL 0    oxyCODONE (ROXICODONE) 5 MG tablet Take 1-2 tablets (5-10 mg) by mouth every 4 hours as needed for pain. 200 tablet 0    ramelteon (ROZEREM) 8 MG tablet Take 1 tablet (8 mg) by mouth at bedtime. Take at 6pm nightly 30 tablet 3    rivaroxaban ANTICOAGULANT (XARELTO) 20 MG TABS tablet Take 1 tablet (20 mg) by mouth daily (with dinner). 90 tablet 3    Rivaroxaban ANTICOAGULANT 15 & 20 MG TBPK Starter Therapy Pack Take 15 mg by mouth 2 times daily (with meals) for 21 days, THEN 20 mg daily with food for 9 days. 51 each 0    Vitamin D3 (CHOLECALCIFEROL) 25 mcg (1000 units) tablet Take 1 tablet (25 mcg) by mouth daily. 90 tablet 3        Labs and Data         10/17/2022    10:42 AM 12/8/2023     2:24 PM 6/21/2024     1:58 PM   PROMIS-10 Total Score w/o Sub Scores   PROMIS TOTAL - SUBSCORES 9 15 12          No data to display                  9/10/2024    12:15 PM 10/29/2024    11:05 AM 11/21/2024     2:19 PM   PHQ-9 SCORE   PHQ-9 Total Score MyChart 21 (Severe depression)     PHQ-9 Total Score 21 14  14         12/8/2023     2:26 PM 6/21/2024     1:56 PM 10/29/2024    11:05 AM   JENNIFFER-7 SCORE   Total Score 20 (severe anxiety) 11 (moderate anxiety)    Total Score 20 11 16       Liver/Kidney Function, TSH Metabolic Blood counts   Recent Labs   Lab Test 10/30/24  1146 10/01/24  1141   AST 20 17   ALT 14 13   ALKPHOS 87 92   CR 0.95 1.10*     Recent Labs   Lab Test 03/16/23  1650   TSH 0.80    Recent Labs   Lab Test 09/11/24  1052   CHOL 177   TRIG 110   LDL 81   HDL 74     Recent Labs   Lab Test 09/11/24  1053   A1C 6.0*     Recent Labs   Lab Test 10/30/24  1146   GLC 99    Recent Labs   Lab Test 10/30/24  1146   WBC 3.0*   HGB 10.0*   HCT 29.6*   *              ECG 10/19/23 QTc = 423ms    PROVIDER: Odette Peralta MD      Level of Medical Decision Making:   - At least 1 chronic problem that is not stable  - Engaged in prescription drug management during visit (discussed any medication benefits, side effects, alternatives, etc.)     The longitudinal plan of care for the diagnosis(es)/condition(s) as documented were addressed during this visit. Due to the added complexity in care, I will continue to support Cleaster in the subsequent management and with ongoing continuity of care.    Psychiatry Individual Psychotherapy Note   Psychotherapy start time - 1420  Psychotherapy end time - 1520  Date treatment plan last reviewed with patient - 12/04/23  Subjective: This supportive psychotherapy session addressed issues related to goals of therapy and current psychosocial stressors. Patient's reaction: Preparatory in the context of mental status appropriate for ambulatory setting.    Interactive complexity indicated? Yes, visit entailed Interactive Complexity evidenced by:  -The need to manage maladaptive communication (related to, e.g., high anxiety, high reactivity, repeated questions, or disagreement) among participants that complicates delivery of care  Plan: RTC in timeframe noted above  Psychotherapy  services during this visit included myself and the patient.   Treatment Plan      SYMPTOMS; PROBLEMS   MEASURABLE GOALS;    FUNCTIONAL IMPROVEMENT / GAINS INTERVENTIONS DISCHARGE CRITERIA   Depression: depressed mood, low energy, feeling hopelesss, and excessive crying  Anxiety: excessive worry, feeling fearful, and nervous/overwhelmed  Trauma Related: intrusive memories, trauma trigger psychological / physiological response, and mood dysregulation  Psychosis: auditory hallucinations and disorganized behavior  Dysregulation: mood dysregulation and physically agitated   reduce depressive symptoms, find enjoyment at least once a day, learn best practices for sleep, reduce panic attacks/ excessive worry, make a plan to manage 2-3 anxiety-provoking situations, reduce feeling overwhelmed/ improve decision making skills, develop 2 strategies to cope with trauma triggers/intrusive memories, reduce manic/hypomanic episodes, identify coping strategies for AH, and learn 2 new ways of coping with routine stressors Supportive / psychodynamic marked symptom improvement, reduced visit frequency, significant improvement in self-reports for anxiety and depression consecutive visits, and can transfer back to primary care     Patient staffed in clinic with Dr. Woodruff who will sign the note.  Supervisor is Dr. Johnson.

## 2024-11-25 ENCOUNTER — INFUSION THERAPY VISIT (OUTPATIENT)
Dept: ONCOLOGY | Facility: CLINIC | Age: 49
End: 2024-11-25
Attending: INTERNAL MEDICINE
Payer: MEDICARE

## 2024-11-25 VITALS
WEIGHT: 236 LBS | TEMPERATURE: 98.6 F | RESPIRATION RATE: 18 BRPM | OXYGEN SATURATION: 96 % | BODY MASS INDEX: 32.93 KG/M2 | HEART RATE: 92 BPM | SYSTOLIC BLOOD PRESSURE: 123 MMHG | DIASTOLIC BLOOD PRESSURE: 79 MMHG

## 2024-11-25 DIAGNOSIS — Z17.0 MALIGNANT NEOPLASM OF OVERLAPPING SITES OF LEFT BREAST IN FEMALE, ESTROGEN RECEPTOR POSITIVE (H): Primary | ICD-10-CM

## 2024-11-25 DIAGNOSIS — C50.812 MALIGNANT NEOPLASM OF OVERLAPPING SITES OF LEFT BREAST IN FEMALE, ESTROGEN RECEPTOR POSITIVE (H): Primary | ICD-10-CM

## 2024-11-25 DIAGNOSIS — C50.912 CARCINOMA OF LEFT BREAST METASTATIC TO BONE (H): ICD-10-CM

## 2024-11-25 DIAGNOSIS — C79.51 CARCINOMA OF LEFT BREAST METASTATIC TO BONE (H): ICD-10-CM

## 2024-11-25 LAB
ALBUMIN SERPL BCG-MCNC: 4.1 G/DL (ref 3.5–5.2)
ALP SERPL-CCNC: 93 U/L (ref 40–150)
ALT SERPL W P-5'-P-CCNC: 10 U/L (ref 0–50)
ANION GAP SERPL CALCULATED.3IONS-SCNC: 11 MMOL/L (ref 7–15)
AST SERPL W P-5'-P-CCNC: 16 U/L (ref 0–45)
BASOPHILS # BLD AUTO: 0 10E3/UL (ref 0–0.2)
BASOPHILS NFR BLD AUTO: 1 %
BILIRUB SERPL-MCNC: 0.2 MG/DL
BUN SERPL-MCNC: 13.1 MG/DL (ref 6–20)
CALCIUM SERPL-MCNC: 9.8 MG/DL (ref 8.8–10.4)
CANCER AG15-3 SERPL-ACNC: 52 U/ML
CEA SERPL-MCNC: 4.3 NG/ML
CHLORIDE SERPL-SCNC: 107 MMOL/L (ref 98–107)
CREAT SERPL-MCNC: 1.1 MG/DL (ref 0.51–0.95)
EGFRCR SERPLBLD CKD-EPI 2021: 61 ML/MIN/1.73M2
EOSINOPHIL # BLD AUTO: 0.1 10E3/UL (ref 0–0.7)
EOSINOPHIL NFR BLD AUTO: 3 %
ERYTHROCYTE [DISTWIDTH] IN BLOOD BY AUTOMATED COUNT: 13.2 % (ref 10–15)
GLUCOSE SERPL-MCNC: 160 MG/DL (ref 70–99)
HCO3 SERPL-SCNC: 24 MMOL/L (ref 22–29)
HCT VFR BLD AUTO: 35.6 % (ref 35–47)
HGB BLD-MCNC: 11.6 G/DL (ref 11.7–15.7)
IMM GRANULOCYTES # BLD: 0 10E3/UL
IMM GRANULOCYTES NFR BLD: 0 %
LYMPHOCYTES # BLD AUTO: 1.3 10E3/UL (ref 0.8–5.3)
LYMPHOCYTES NFR BLD AUTO: 40 %
MCH RBC QN AUTO: 33.6 PG (ref 26.5–33)
MCHC RBC AUTO-ENTMCNC: 32.6 G/DL (ref 31.5–36.5)
MCV RBC AUTO: 103 FL (ref 78–100)
MONOCYTES # BLD AUTO: 0.3 10E3/UL (ref 0–1.3)
MONOCYTES NFR BLD AUTO: 9 %
NEUTROPHILS # BLD AUTO: 1.6 10E3/UL (ref 1.6–8.3)
NEUTROPHILS NFR BLD AUTO: 48 %
NRBC # BLD AUTO: 0 10E3/UL
NRBC BLD AUTO-RTO: 0 /100
PLATELET # BLD AUTO: 190 10E3/UL (ref 150–450)
POTASSIUM SERPL-SCNC: 3.8 MMOL/L (ref 3.4–5.3)
PROT SERPL-MCNC: 7.6 G/DL (ref 6.4–8.3)
RBC # BLD AUTO: 3.45 10E6/UL (ref 3.8–5.2)
SODIUM SERPL-SCNC: 142 MMOL/L (ref 135–145)
WBC # BLD AUTO: 3.4 10E3/UL (ref 4–11)

## 2024-11-25 PROCEDURE — 82378 CARCINOEMBRYONIC ANTIGEN: CPT

## 2024-11-25 PROCEDURE — 250N000011 HC RX IP 250 OP 636: Mod: JZ | Performed by: INTERNAL MEDICINE

## 2024-11-25 PROCEDURE — 36415 COLL VENOUS BLD VENIPUNCTURE: CPT

## 2024-11-25 PROCEDURE — 86300 IMMUNOASSAY TUMOR CA 15-3: CPT

## 2024-11-25 PROCEDURE — 84075 ASSAY ALKALINE PHOSPHATASE: CPT

## 2024-11-25 PROCEDURE — 84155 ASSAY OF PROTEIN SERUM: CPT

## 2024-11-25 PROCEDURE — 85004 AUTOMATED DIFF WBC COUNT: CPT

## 2024-11-25 PROCEDURE — 96402 CHEMO HORMON ANTINEOPL SQ/IM: CPT

## 2024-11-25 RX ORDER — LAMOTRIGINE 25 MG/1
500 TABLET ORAL ONCE
Status: COMPLETED | OUTPATIENT
Start: 2024-11-25 | End: 2024-11-25

## 2024-11-25 RX ADMIN — FULVESTRANT 500 MG: 50 INJECTION, SOLUTION INTRAMUSCULAR at 14:16

## 2024-11-25 ASSESSMENT — PAIN SCALES - GENERAL: PAINLEVEL_OUTOF10: SEVERE PAIN (6)

## 2024-11-25 NOTE — NURSING NOTE
Chief Complaint   Patient presents with    Blood Draw     Vitals, blood drawn via VPT by LPN. Pt checked into appt.      FLOR Valdovinos LPN

## 2024-11-25 NOTE — PROGRESS NOTES
Infusion Injection Note:  Teresa Sanderson presents today for fulvestrant injections.    Patient seen by provider today: No   present during visit today: Not Applicable.    Patient declined to speak with an RN today; denies any new questions or concerns.    Treatment Conditions:  Not Applicable.    Pre and Post Injection:  Fulvestrant injections given to bilateral ventrogluteal sites concurrently by writer and Digna Pretty RN without incident.   Patient tolerated procedure well.    Discharge Plan:  Patient declined prescription refills.  Discharge instructions reviewed with: Patient.  Patient and/or family verbalized understanding of discharge instructions and all questions answered.  AVS to patient via [employer].    Patient discharged in stable condition accompanied by: daughter.  Departure Mode: Ambulatory.  Patient will return 12/5/2024 for next appointment.      Eloisa JONES on 11/25/2024 at 2:40 PM

## 2024-12-01 ENCOUNTER — HEALTH MAINTENANCE LETTER (OUTPATIENT)
Age: 49
End: 2024-12-01

## 2024-12-04 NOTE — PROGRESS NOTES
Oncology Visit:   Date on this visit: Dec 5, 2024    Diagnosis:  ER positive left breast cancer metastasized to bones.     Primary Physician: No Ref-Primary, Physician     History Of Present Illness:    Ms. Sanderson is a 49 year old female with a h/o tobacco abuse and DVTs with left breast cancer metastasized to bone. She presented to [place] ED with back pain on 12/5/2020. MRI of the L-spine showed an abnormal L4 lesion with associated right paraspinal mass, abnormalities in L5 and the left iliac bone were also seen. CT C/A/P showed a left breast mass, lytic lesions of T7, L4, and the pelvis, and a 3 cm lesion in the kidney (thought to be a cyst). Ultrasound of the bilateral lower extremities showed a non-occlusive thrombus in the left popliteal vein. Mammogram and ultrasound of the bilateral breasts on 12/17/2020 showed a spiculated mass measuring at least 7.8 cm at 12-1:00 left breast extending from the nipple to 9 cm from the nipple with associated nipple retraction. This mass was biopsied, and showed IDC with surrounding DCIS, grade 3, ER+ 90%, and PA+ 75%.  HER2 was equivocal in approximately 35% of tumor cells by FISH and was negative by IHC.    Metastatic Breast Cancer Treatment:  12/23/2021 - 1/7/2021  Radiation (3000 cGy) to the lumbar spine.  1/29/2021 - present  Ibrance, zoladex, and anastrozole.  5/17/2021 radiofrequency ablation, kyphoplasty to L4  8/20/2021  Bilateral salpingo-oophorectomies, Ibrance and anastrozole.  She was off anastrozole 12/2021 - 2/2022 and off Ibrance 01/2022 - 02/2022 due to stress and impending homelessness. Off both again 03/2022-04/2022 due to death of her son but restarted in 05/2022.  04/2023  PET/CT with progression in 2 small metastases in the left pelvis.  Palbociclib continued.  Anastrozole changed to exemestane  5/5/2023  Completed radiation, 2000 cGy in 5 fractions, to the left ilium.  12/19/2023 Left breast biopsy  [facility] NGS:   ER positive, 3+  100%   PA positive,  1+, 5%   HER2 negative.   AR positive, 1+, 35%   MMR proficient   PD-L1 negative, CPS 0   PTEN positive, 1+ 100%  *Of note, all of the above was IHC, DNA quantity not sufficient for NGS  1/18/24 - present  Fulvestrant + abemaciclib.  Abemaciclib dose reduced to 100 mg PO BID due to hand foot syndrome.  7/18/2024  completed radiation to the left acetabulum and the SI    Interval History:   Teresa Sanderson comes into clinic today, alongside her daughter, for metastatic breast cancer follow up.  She continues on treatment with abemaciclib and fulvestrant.  She notes she has had more diffuse joint pain and stiffness in the last few months. The past few nights she has had intense leg cramping in her L upper thigh and down her inner leg. She states she is compliant with anticoagulation. Pain is not present during the day. She did increase her oxycodone. Otherwise she is using muscle relaxant and a heating pad. It has been hard to try to be active with the pain. She is not sure if stiffness improves with activity or not.     No fevers/chills or infectious concerns. No issues with nausea/vomiting, diarrhea, or loss of appetite.         Past Medical/Surgical History:   Past Medical History:   Diagnosis Date    Anxiety     Breast CA (H) 12/2020    Depression     DVT (deep venous thrombosis) (H) 2014    Left breast mass     x approximately 4-5 months    Pulmonary emboli (H)     Pyelonephritis     Schizoaffective disorder (H)     Tobacco use      Past Surgical History:   Procedure Laterality Date    COLONOSCOPY N/A 7/8/2022    Procedure: COLONOSCOPY, WITH POLYPECTOMY;  Surgeon: Ham Cano MD;  [place] [facility] [place]    [place] LUMBAR KYPHOPLASTY VERTEBRAE  5/17/2021    LAPAROSCOPIC SALPINGO-OOPHORECTOMY Bilateral 8/20/2021    Procedure: BILATERAL SALPINGO-OOPHORECTOMY, LAPAROSCOPIC;  Surgeon: Rory Lopez MD;  [place] [facility] [place]    TUBAL LIGATION  1998        Allergies   Allergen Reactions    Contrast Dye      Pt  developed nausea after isovue 370 injection on 6/9/21        Current Outpatient Medications   Medication Sig Dispense Refill    [START ON 12/23/2024] abemaciclib (VERZENIO) 100 MG tablet Take 1 tablet (100 mg) by mouth 2 times daily for 28 days 56 tablet 0    gabapentin (NEURONTIN) 300 MG capsule Take 2 capsules in the morning, 2 capsules in afternoon, and 3 capsules at bedtime. 210 capsule 1    methocarbamol (ROBAXIN) 500 MG tablet Take 2-3 tablets (1,000-1,500 mg) by mouth 3 times daily as needed for muscle spasms. 210 tablet 2    methylPREDNISolone (MEDROL) 32 MG tablet 12 hours prior to scan and 2 hours prior to scan 2 tablet 3    mirtazapine (REMERON) 15 MG tablet Take 1 tablet (15 mg) by mouth at bedtime. 30 tablet 1    oxyCODONE (ROXICODONE) 5 MG tablet Take 1-2 tablets (5-10 mg) by mouth every 4 hours as needed for pain. 200 tablet 0    QUEtiapine (SEROQUEL) 100 MG tablet Tale 1 tablet (100mg) with 1 tablet (400mg) for total of 500mg by mouth at bedtime 30 tablet 1    QUEtiapine (SEROQUEL) 400 MG tablet Take 1 tablet (400mg) with 1 tablet (100mg) for total of 500mg by mouth at bedtime 30 tablet 1    QUEtiapine (SEROQUEL) 50 MG tablet Take 0.5-1 tablets (25-50 mg) by mouth 2 times daily as needed (for sleep or anxiety attacks). 60 tablet 0    ramelteon (ROZEREM) 8 MG tablet Take 1 tablet (8 mg) by mouth at bedtime. Take at 6pm nightly 30 tablet 3    rivaroxaban ANTICOAGULANT (XARELTO) 20 MG TABS tablet Take 1 tablet (20 mg) by mouth daily (with dinner). 90 tablet 3    Rivaroxaban ANTICOAGULANT 15 & 20 MG TBPK Starter Therapy Pack Take 15 mg by mouth 2 times daily (with meals) for 21 days, THEN 20 mg daily with food for 9 days. 51 each 0    sertraline (ZOLOFT) 100 MG tablet Take 0.5 tablets (50 mg) by mouth daily. 30 tablet 1    Vitamin D3 (CHOLECALCIFEROL) 25 mcg (1000 units) tablet Take 1 tablet (25 mcg) by mouth daily. 90 tablet 3     No current facility-administered medications for this visit.    '  Physical Exam:   BP (!) 141/88   Pulse 73   Temp 98.2  F (36.8  C) (Oral)   Resp 18   Wt 111.1 kg (244 lb 14.4 oz)   SpO2 99%   BMI 34.17 kg/m    General:  Well appearing adult female in NAD.  Alert and oriented x 3.  HEENT:  Normocephalic.  Sclera anicteric. MMM.    Lymph:  No palpable cervical, supraclavicular, or axillary LAD.   Chest:  CTA bilaterally.  No wheezes or crackles.  CV:  RRR.  No audible m/r/g.  Abd:  Soft/ND/NT.    Ext:  Left lower extremity appears slightly larger than the right. No focal tenderness.  Neuro:  Cranial nerves grossly intact. Gait stable.  No tremor or dyskinetic movements.  Psych:  Mood and affect appear normal.    Skin: No visible concerning skin rashes or lesions.    Laboratory/Imaging Studies:   I personally reviewed the below laboratories and images while in clinic today:     12/05/24 08:22   Sodium 140   Potassium 3.9   Chloride 105   Carbon Dioxide (CO2) 26   Urea Nitrogen 12.6   Creatinine 1.07 (H)   GFR Estimate 63   Calcium 9.0  9.0   Anion Gap 9   Magnesium 1.7   Albumin 3.8   Protein Total 7.2   Alkaline Phosphatase 92   ALT 10   AST 17   Bilirubin Total <0.2   CEA 4.2   Glucose 105 (H)   WBC 3.2 (L)   Hemoglobin 10.1 (L)   Hematocrit 31.0 (L)   Platelet Count 162   RBC Count 2.99 (L)    (H)   MCH 33.8 (H)   MCHC 32.6   RDW 13.1   % Neutrophils 52   % Lymphocytes 35   % Monocytes 8   % Eosinophils 3   % Basophils 2   Absolute Basophils 0.1   Absolute Eosinophils 0.1   Absolute Immature Granulocytes 0.0   Absolute Lymphocytes 1.1   Absolute Monocytes 0.3   % Immature Granulocytes 0   Absolute Neutrophils 1.7   Absolute NRBCs 0.0   NRBCs per 100 WBC 0     LLE Duplex US 12/5     IMPRESSION:   1. No significant change from previous.  A. Persistent non occlusive deep venous thrombosis or chronic venous  changes in one of the duplicated left femoral veins in the distal  thigh.  B. Persistent non occlusive deep venous thrombosis or chronic venous  changes in the  left popliteal vein.    ASSESSMENT/PLAN:   49 year old female with history of DVT and ER positive left breast cancer metastasized to bones.    1.  Metastatic breast cancer: She has been on her most recent treatment with abemaciclib and fulvestrant since 01/2024.    - Last PET/CT showed a good response to treatment with decrease in uptake in left breast mass as well as in known osseous metastases.  - Plan to continue treatment with Abemaciclib and fulvestrant at this time.  Abemaciclib dose reduced to 100 mg PO BID secondary to hand foot syndrome.  Symptoms improved with the dose reduction.  - Will receive fulvestrant today.  - Recommend tissue biopsy vs Guardant 360 liquid NGS at time of progression to help guide next treatment (capivasertib vs everolimus vs elacestrant)    2.  Bone metastases/low back pain with LLE sciatica:  She is s/p XRT to the left ilium as well as the lumbar spine and kyphoplasty of L4--with some extravasation of cement which procedularist is aware of and recommended continued AC indefinitely.   - most recently received radiation to the left acetabulum and the SI, completed on 7/18/2024.  - She is currently taking oxycodone, Robaxin, and gabapentin.  Ongoing follow up with palliative medicine.  -Her joint pain and stiffness sound more consistent with AI induced arthralgias. Cymbalta can be used for this however this may be difficult to try with her other psychiatric medications. Encouraged stretching, routine physical activity, and continuing vitamin D as these help as well.   - interventional pain clinic referral placed to determine if patient may benefit from a pain relieving procedure.  - Receiving Zometa once every 3 months. Given today.     3.  Schizoaffective disorder, bipolar type: No change with last visit.  She is on treatment with Seroquel and Zoloft. Ongoing follow up with psychiatry.     4.  LLE edema and calf pain/h/o DVT: Repeated duplex study--this shows no advancement of known  DVT. Encouraged her to increase hydration, tonic water, electrolytes, etc to treat as muscle cramp.     5.  FDG uptake stomach: Seen on last few PETs. Diagnostic EGD referral was placed at last visit.     6.  FELTON:  Secondary to abemaciclib. Cr is stable. Continue to avoid NSAIDs and encouraged good hydration.       The longitudinal plan of care for the diagnosis(es)/condition(s) as documented were addressed during this visit. Due to the added complexity in care, I will continue to support Teresa in the subsequent management and with ongoing continuity of care.      Alee De Los Santos PA-C

## 2024-12-05 ENCOUNTER — APPOINTMENT (OUTPATIENT)
Dept: LAB | Facility: CLINIC | Age: 49
End: 2024-12-05
Attending: INTERNAL MEDICINE
Payer: MEDICARE

## 2024-12-05 ENCOUNTER — ANCILLARY PROCEDURE (OUTPATIENT)
Dept: ULTRASOUND IMAGING | Facility: CLINIC | Age: 49
End: 2024-12-05
Attending: PHYSICIAN ASSISTANT
Payer: MEDICARE

## 2024-12-05 ENCOUNTER — ONCOLOGY VISIT (OUTPATIENT)
Dept: ONCOLOGY | Facility: CLINIC | Age: 49
End: 2024-12-05
Attending: INTERNAL MEDICINE
Payer: MEDICARE

## 2024-12-05 VITALS
RESPIRATION RATE: 18 BRPM | TEMPERATURE: 98.2 F | DIASTOLIC BLOOD PRESSURE: 88 MMHG | SYSTOLIC BLOOD PRESSURE: 141 MMHG | WEIGHT: 244.9 LBS | HEART RATE: 73 BPM | OXYGEN SATURATION: 99 % | BODY MASS INDEX: 34.17 KG/M2

## 2024-12-05 DIAGNOSIS — Z17.0 MALIGNANT NEOPLASM OF OVERLAPPING SITES OF LEFT BREAST IN FEMALE, ESTROGEN RECEPTOR POSITIVE (H): ICD-10-CM

## 2024-12-05 DIAGNOSIS — C50.812 MALIGNANT NEOPLASM OF OVERLAPPING SITES OF LEFT BREAST IN FEMALE, ESTROGEN RECEPTOR POSITIVE (H): ICD-10-CM

## 2024-12-05 DIAGNOSIS — C50.812 MALIGNANT NEOPLASM OF OVERLAPPING SITES OF LEFT BREAST IN FEMALE, ESTROGEN RECEPTOR POSITIVE (H): Primary | ICD-10-CM

## 2024-12-05 DIAGNOSIS — I82.462 ACUTE DEEP VEIN THROMBOSIS (DVT) OF CALF MUSCLE VEIN OF LEFT LOWER EXTREMITY (H): ICD-10-CM

## 2024-12-05 DIAGNOSIS — C79.51 CARCINOMA OF LEFT BREAST METASTATIC TO BONE (H): Primary | ICD-10-CM

## 2024-12-05 DIAGNOSIS — C79.51 CARCINOMA OF LEFT BREAST METASTATIC TO BONE (H): ICD-10-CM

## 2024-12-05 DIAGNOSIS — C50.912 CARCINOMA OF LEFT BREAST METASTATIC TO BONE (H): ICD-10-CM

## 2024-12-05 DIAGNOSIS — Z17.0 MALIGNANT NEOPLASM OF OVERLAPPING SITES OF LEFT BREAST IN FEMALE, ESTROGEN RECEPTOR POSITIVE (H): Primary | ICD-10-CM

## 2024-12-05 DIAGNOSIS — C50.912 CARCINOMA OF LEFT BREAST METASTATIC TO BONE (H): Primary | ICD-10-CM

## 2024-12-05 LAB
CALCIUM SERPL-MCNC: 9 MG/DL (ref 8.8–10.4)
MAGNESIUM SERPL-MCNC: 1.7 MG/DL (ref 1.7–2.3)

## 2024-12-05 PROCEDURE — 82310 ASSAY OF CALCIUM: CPT | Performed by: INTERNAL MEDICINE

## 2024-12-05 PROCEDURE — 258N000003 HC RX IP 258 OP 636: Performed by: INTERNAL MEDICINE

## 2024-12-05 PROCEDURE — 93971 EXTREMITY STUDY: CPT | Mod: LT | Performed by: RADIOLOGY

## 2024-12-05 PROCEDURE — G0463 HOSPITAL OUTPT CLINIC VISIT: HCPCS | Mod: 25 | Performed by: PHYSICIAN ASSISTANT

## 2024-12-05 PROCEDURE — 250N000011 HC RX IP 250 OP 636: Performed by: INTERNAL MEDICINE

## 2024-12-05 PROCEDURE — G2211 COMPLEX E/M VISIT ADD ON: HCPCS | Performed by: PHYSICIAN ASSISTANT

## 2024-12-05 PROCEDURE — 83735 ASSAY OF MAGNESIUM: CPT | Performed by: PHYSICIAN ASSISTANT

## 2024-12-05 PROCEDURE — 99214 OFFICE O/P EST MOD 30 MIN: CPT | Performed by: PHYSICIAN ASSISTANT

## 2024-12-05 RX ORDER — ZOLEDRONIC ACID 0.04 MG/ML
4 INJECTION, SOLUTION INTRAVENOUS ONCE
Status: COMPLETED | OUTPATIENT
Start: 2024-12-05 | End: 2024-12-05

## 2024-12-05 RX ORDER — ZOLEDRONIC ACID 0.04 MG/ML
4 INJECTION, SOLUTION INTRAVENOUS ONCE
OUTPATIENT
Start: 2025-03-03 | End: 2025-03-03

## 2024-12-05 RX ORDER — HEPARIN SODIUM,PORCINE 10 UNIT/ML
5 VIAL (ML) INTRAVENOUS
OUTPATIENT
Start: 2025-03-03

## 2024-12-05 RX ORDER — LAMOTRIGINE 25 MG/1
500 TABLET ORAL ONCE
OUTPATIENT
Start: 2024-12-05 | End: 2024-12-05

## 2024-12-05 RX ORDER — HEPARIN SODIUM (PORCINE) LOCK FLUSH IV SOLN 100 UNIT/ML 100 UNIT/ML
5 SOLUTION INTRAVENOUS
OUTPATIENT
Start: 2025-03-03

## 2024-12-05 RX ADMIN — ZOLEDRONIC ACID 4 MG: 0.04 INJECTION, SOLUTION INTRAVENOUS at 09:51

## 2024-12-05 RX ADMIN — SODIUM CHLORIDE 50 ML: 9 INJECTION, SOLUTION INTRAVENOUS at 09:50

## 2024-12-05 ASSESSMENT — PAIN SCALES - GENERAL: PAINLEVEL_OUTOF10: WORST PAIN (10)

## 2024-12-05 NOTE — LETTER
12/5/2024      Teresa Sanderson  [address]  [place] [place] [address]      Dear Colleague,    Thank you for referring your patient, Teresa Sanderson, to the [facility]. Please see a copy of my visit note below.    Oncology Visit:   Date on this visit: Dec 5, 2024    Diagnosis:  ER positive left breast cancer metastasized to bones.     Primary Physician: No Ref-Primary, Physician     History Of Present Illness:    Ms. Sanderson is a 49 year old female with a h/o tobacco abuse and DVTs with left breast cancer metastasized to bone. She presented to [place] ED with back pain on 12/5/2020. MRI of the L-spine showed an abnormal L4 lesion with associated right paraspinal mass, abnormalities in L5 and the left iliac bone were also seen. CT C/A/P showed a left breast mass, lytic lesions of T7, L4, and the pelvis, and a 3 cm lesion in the kidney (thought to be a cyst). Ultrasound of the bilateral lower extremities showed a non-occlusive thrombus in the left popliteal vein. Mammogram and ultrasound of the bilateral breasts on 12/17/2020 showed a spiculated mass measuring at least 7.8 cm at 12-1:00 left breast extending from the nipple to 9 cm from the nipple with associated nipple retraction. This mass was biopsied, and showed IDC with surrounding DCIS, grade 3, ER+ 90%, and ME+ 75%.  HER2 was equivocal in approximately 35% of tumor cells by FISH and was negative by IHC.    Metastatic Breast Cancer Treatment:  12/23/2021 - 1/7/2021  Radiation (3000 cGy) to the lumbar spine.  1/29/2021 - present  Ibrance, zoladex, and anastrozole.  5/17/2021 radiofrequency ablation, kyphoplasty to L4  8/20/2021  Bilateral salpingo-oophorectomies, Ibrance and anastrozole.  She was off anastrozole 12/2021 - 2/2022 and off Ibrance 01/2022 - 02/2022 due to stress and impending homelessness. Off both again 03/2022-04/2022 due to death of her son but restarted in 05/2022.  04/2023  PET/CT with  progression in 2 small metastases in the left pelvis.  Palbociclib continued.  Anastrozole changed to exemestane  5/5/2023  Completed radiation, 2000 cGy in 5 fractions, to the left ilium.  12/19/2023 Left breast biopsy  [facility] NGS:   ER positive, 3+  100%   VT positive, 1+, 5%   HER2 negative.   AR positive, 1+, 35%   MMR proficient   PD-L1 negative, CPS 0   PTEN positive, 1+ 100%  *Of note, all of the above was IHC, DNA quantity not sufficient for NGS  1/18/24 - present  Fulvestrant + abemaciclib.  Abemaciclib dose reduced to 100 mg PO BID due to hand foot syndrome.  7/18/2024  completed radiation to the left acetabulum and the SI    Interval History:   Teresa Sanderson comes into clinic today, alongside her daughter, for metastatic breast cancer follow up.  She continues on treatment with abemaciclib and fulvestrant.  She notes she has had more diffuse joint pain and stiffness in the last few months. The past few nights she has had intense leg cramping in her L upper thigh and down her inner leg. She states she is compliant with anticoagulation. Pain is not present during the day. She did increase her oxycodone. Otherwise she is using muscle relaxant and a heating pad. It has been hard to try to be active with the pain. She is not sure if stiffness improves with activity or not.     No fevers/chills or infectious concerns. No issues with nausea/vomiting, diarrhea, or loss of appetite.         Past Medical/Surgical History:   Past Medical History:   Diagnosis Date     Anxiety      Breast CA (H) 12/2020     Depression      DVT (deep venous thrombosis) (H) 2014     Left breast mass     x approximately 4-5 months     Pulmonary emboli (H)      Pyelonephritis      Schizoaffective disorder (H)      Tobacco use      Past Surgical History:   Procedure Laterality Date     COLONOSCOPY N/A 7/8/2022    Procedure: COLONOSCOPY, WITH POLYPECTOMY;  Surgeon: Ham Cano MD;  [facility]: [facility] [place]     IR LUMBAR KYPHOPLASTY VERTEBRAE   5/17/2021     LAPAROSCOPIC SALPINGO-OOPHORECTOMY Bilateral 8/20/2021    Procedure: BILATERAL SALPINGO-OOPHORECTOMY, LAPAROSCOPIC;  Surgeon: Rory Lopez MD;  Location: [facility] OR     TUBAL LIGATION  1998        Allergies   Allergen Reactions     Contrast Dye      Pt developed nausea after isovue 370 injection on 6/9/21        Current Outpatient Medications   Medication Sig Dispense Refill     [START ON 12/23/2024] abemaciclib (VERZENIO) 100 MG tablet Take 1 tablet (100 mg) by mouth 2 times daily for 28 days 56 tablet 0     gabapentin (NEURONTIN) 300 MG capsule Take 2 capsules in the morning, 2 capsules in afternoon, and 3 capsules at bedtime. 210 capsule 1     methocarbamol (ROBAXIN) 500 MG tablet Take 2-3 tablets (1,000-1,500 mg) by mouth 3 times daily as needed for muscle spasms. 210 tablet 2     methylPREDNISolone (MEDROL) 32 MG tablet 12 hours prior to scan and 2 hours prior to scan 2 tablet 3     mirtazapine (REMERON) 15 MG tablet Take 1 tablet (15 mg) by mouth at bedtime. 30 tablet 1     oxyCODONE (ROXICODONE) 5 MG tablet Take 1-2 tablets (5-10 mg) by mouth every 4 hours as needed for pain. 200 tablet 0     QUEtiapine (SEROQUEL) 100 MG tablet Tale 1 tablet (100mg) with 1 tablet (400mg) for total of 500mg by mouth at bedtime 30 tablet 1     QUEtiapine (SEROQUEL) 400 MG tablet Take 1 tablet (400mg) with 1 tablet (100mg) for total of 500mg by mouth at bedtime 30 tablet 1     QUEtiapine (SEROQUEL) 50 MG tablet Take 0.5-1 tablets (25-50 mg) by mouth 2 times daily as needed (for sleep or anxiety attacks). 60 tablet 0     ramelteon (ROZEREM) 8 MG tablet Take 1 tablet (8 mg) by mouth at bedtime. Take at 6pm nightly 30 tablet 3     rivaroxaban ANTICOAGULANT (XARELTO) 20 MG TABS tablet Take 1 tablet (20 mg) by mouth daily (with dinner). 90 tablet 3     Rivaroxaban ANTICOAGULANT 15 & 20 MG TBPK Starter Therapy Pack Take 15 mg by mouth 2 times daily (with meals) for 21 days, THEN 20 mg daily with food for 9  days. 51 each 0     sertraline (ZOLOFT) 100 MG tablet Take 0.5 tablets (50 mg) by mouth daily. 30 tablet 1     Vitamin D3 (CHOLECALCIFEROL) 25 mcg (1000 units) tablet Take 1 tablet (25 mcg) by mouth daily. 90 tablet 3     No current facility-administered medications for this visit.   '  Physical Exam:   BP (!) 141/88   Pulse 73   Temp 98.2  F (36.8  C) (Oral)   Resp 18   Wt 111.1 kg (244 lb 14.4 oz)   SpO2 99%   BMI 34.17 kg/m    General:  Well appearing adult female in NAD.  Alert and oriented x 3.  HEENT:  Normocephalic.  Sclera anicteric. MMM.    Lymph:  No palpable cervical, supraclavicular, or axillary LAD.   Chest:  CTA bilaterally.  No wheezes or crackles.  CV:  RRR.  No audible m/r/g.  Abd:  Soft/ND/NT.    Ext:  Left lower extremity appears slightly larger than the right. No focal tenderness.  Neuro:  Cranial nerves grossly intact. Gait stable.  No tremor or dyskinetic movements.  Psych:  Mood and affect appear normal.    Skin: No visible concerning skin rashes or lesions.    Laboratory/Imaging Studies:   I personally reviewed the below laboratories and images while in clinic today:     12/05/24 08:22   Sodium 140   Potassium 3.9   Chloride 105   Carbon Dioxide (CO2) 26   Urea Nitrogen 12.6   Creatinine 1.07 (H)   GFR Estimate 63   Calcium 9.0  9.0   Anion Gap 9   Magnesium 1.7   Albumin 3.8   Protein Total 7.2   Alkaline Phosphatase 92   ALT 10   AST 17   Bilirubin Total <0.2   CEA 4.2   Glucose 105 (H)   WBC 3.2 (L)   Hemoglobin 10.1 (L)   Hematocrit 31.0 (L)   Platelet Count 162   RBC Count 2.99 (L)    (H)   MCH 33.8 (H)   MCHC 32.6   RDW 13.1   % Neutrophils 52   % Lymphocytes 35   % Monocytes 8   % Eosinophils 3   % Basophils 2   Absolute Basophils 0.1   Absolute Eosinophils 0.1   Absolute Immature Granulocytes 0.0   Absolute Lymphocytes 1.1   Absolute Monocytes 0.3   % Immature Granulocytes 0   Absolute Neutrophils 1.7   Absolute NRBCs 0.0   NRBCs per 100 WBC 0     LLE Duplex US 12/5      IMPRESSION:   1. No significant change from previous.  A. Persistent non occlusive deep venous thrombosis or chronic venous  changes in one of the duplicated left femoral veins in the distal  thigh.  B. Persistent non occlusive deep venous thrombosis or chronic venous  changes in the left popliteal vein.    ASSESSMENT/PLAN:   49 year old female with history of DVT and ER positive left breast cancer metastasized to bones.    1.  Metastatic breast cancer: She has been on her most recent treatment with abemaciclib and fulvestrant since 01/2024.    - Last PET/CT showed a good response to treatment with decrease in uptake in left breast mass as well as in known osseous metastases.  - Plan to continue treatment with Abemaciclib and fulvestrant at this time.  Abemaciclib dose reduced to 100 mg PO BID secondary to hand foot syndrome.  Symptoms improved with the dose reduction.  - Will receive fulvestrant today.  - Recommend tissue biopsy vs Guardant 360 liquid NGS at time of progression to help guide next treatment (capivasertib vs everolimus vs elacestrant)    2.  Bone metastases/low back pain with LLE sciatica:  She is s/p XRT to the left ilium as well as the lumbar spine and kyphoplasty of L4--with some extravasation of cement which procedularist is aware of and recommended continued AC indefinitely.   - most recently received radiation to the left acetabulum and the SI, completed on 7/18/2024.  - She is currently taking oxycodone, Robaxin, and gabapentin.  Ongoing follow up with palliative medicine.  -Her joint pain and stiffness sound more consistent with AI induced arthralgias. Cymbalta can be used for this however this may be difficult to try with her other psychiatric medications. Encouraged stretching, routine physical activity, and continuing vitamin D as these help as well.   - interventional pain clinic referral placed to determine if patient may benefit from a pain relieving procedure.  - Receiving Zometa  once every 3 months. Given today.     3.  Schizoaffective disorder, bipolar type: No change with last visit.  She is on treatment with Seroquel and Zoloft. Ongoing follow up with psychiatry.     4.  LLE edema and calf pain/h/o DVT: Repeated duplex study--this shows no advancement of known DVT. Encouraged her to increase hydration, tonic water, electrolytes, etc to treat as muscle cramp.     5.  FDG uptake stomach: Seen on last few PETs. Diagnostic EGD referral was placed at last visit.     6.  FELTON:  Secondary to abemaciclib. Cr is stable. Continue to avoid NSAIDs and encouraged good hydration.       The longitudinal plan of care for the diagnosis(es)/condition(s) as documented were addressed during this visit. Due to the added complexity in care, I will continue to support Cleiris in the subsequent management and with ongoing continuity of care.      Alee De Los Santos PA-C                       Again, thank you for allowing me to participate in the care of your patient.        Sincerely,        Alee De Los Santos PA-C

## 2024-12-05 NOTE — NURSING NOTE
Chief Complaint   Patient presents with    Blood Draw     Labs drawn with PIV start by RN     Labs drawn with PIV start by RN. Pt tolerated well. Vitals taken. Pt checked into next appointment.    Breanna Marshall RN

## 2024-12-05 NOTE — PROGRESS NOTES
Infusion Nursing Note:  Teresa Sanderson presents today for Cycle 15 day 1 zometa infusion.    Patient seen by provider today: Yes: Alee De Los Santos PA-C   present during visit today: Not Applicable.    Note: Patient reports to infusion clinic today following her provider visit. Denies any questions or concerns at this time, wishes to proceed with treatment today.      Intravenous Access:  Peripheral IV placed.    Treatment Conditions:  Lab Results   Component Value Date    HGB 10.1 (L) 12/05/2024    WBC 3.2 (L) 12/05/2024    ANEU 1.3 (L) 01/15/2024    ANEUTAUTO 1.7 12/05/2024     12/05/2024        Lab Results   Component Value Date     12/05/2024    POTASSIUM 3.9 12/05/2024    MAG 1.8 07/01/2024    CR 1.07 (H) 12/05/2024    NANDO 9.0 12/05/2024    NANDO 9.0 12/05/2024    BILITOTAL <0.2 12/05/2024    ALBUMIN 3.8 12/05/2024    ALT 10 12/05/2024    AST 17 12/05/2024       Results reviewed, labs MET treatment parameters, ok to proceed with treatment.      Post Infusion Assessment:  Patient tolerated infusion without incident.  Blood return noted pre and post infusion.  Site patent and intact, free from redness, edema or discomfort.  No evidence of extravasations.  Access discontinued per protocol.       Discharge Plan:   Patient declined prescription refills.  Discharge instructions reviewed with: Patient and Family.  Patient and/or family verbalized understanding of discharge instructions and all questions answered.  Copy of AVS reviewed with patient and/or family.  Patient will return 12/23 for next appointment.  Patient discharged in stable condition accompanied by: self and son.  Departure Mode: Ambulatory.      Maggie Lees RN

## 2024-12-05 NOTE — NURSING NOTE
"Oncology Rooming Note    December 5, 2024 8:44 AM   Teresa Sanderson is a 49 year old female who presents for:    Chief Complaint   Patient presents with    Blood Draw     Labs drawn with PIV start by RN    Oncology Clinic Visit     Malignant neoplasm of breast     Initial Vitals: BP (!) 141/88   Pulse 73   Temp 98.2  F (36.8  C) (Oral)   Resp 18   Wt 111.1 kg (244 lb 14.4 oz)   SpO2 99%   BMI 34.17 kg/m   Estimated body mass index is 34.17 kg/m  as calculated from the following:    Height as of 10/29/24: 1.803 m (5' 10.98\").    Weight as of this encounter: 111.1 kg (244 lb 14.4 oz). Body surface area is 2.36 meters squared.  Worst Pain (10) Comment: Data Unavailable   No LMP recorded. (Menstrual status: Tubal ).  Allergies reviewed: Yes  Medications reviewed: Yes    Medications: Medication refills not needed today.  Pharmacy name entered into [facility]:    [id number] - [place], [place] - [address] AT [address]  [facility] - [place], [place] - [address]  [id number] - [place], [place] - [address] AT [facility] & [facility]  [facility] #[id number] - [place], [place] - [address] AT [address] [place] [address] & [facility] - [place], [place] - [address]  [id number] - [place], [place] - [address] AT [facility] [address]    Frailty Screening:   Is the patient here for a new oncology consult visit in cancer care? 2. No      Clinical concerns: none      Harjit Sanders LPN              "

## 2024-12-05 NOTE — PATIENT INSTRUCTIONS
[facility] Triage and after hours / weekends / holidays:  [phone] option 5, option 2    Please call the triage or after hours line if you experience a temperature greater than or equal to 100.4, shaking chills, have uncontrolled nausea, vomiting and/or diarrhea, dizziness, shortness of breath, chest pain, bleeding, unexplained bruising, or if you have any other new/concerning symptoms, questions or concerns.      If you are having any concerning symptoms or wish to speak to a provider before your next infusion visit, please call triage to notify your care team so we can adequately serve you.     If you need a refill on a narcotic prescription or other medication, please call before your infusion appointment.

## 2024-12-18 ENCOUNTER — MYC REFILL (OUTPATIENT)
Dept: PALLIATIVE CARE | Facility: CLINIC | Age: 49
End: 2024-12-18
Payer: MEDICARE

## 2024-12-18 DIAGNOSIS — C50.812 MALIGNANT NEOPLASM OF OVERLAPPING SITES OF LEFT BREAST IN FEMALE, ESTROGEN RECEPTOR POSITIVE (H): ICD-10-CM

## 2024-12-18 DIAGNOSIS — Z17.0 MALIGNANT NEOPLASM OF OVERLAPPING SITES OF LEFT BREAST IN FEMALE, ESTROGEN RECEPTOR POSITIVE (H): ICD-10-CM

## 2024-12-18 DIAGNOSIS — G89.3 CANCER ASSOCIATED PAIN: ICD-10-CM

## 2024-12-18 RX ORDER — OXYCODONE HYDROCHLORIDE 5 MG/1
5-10 TABLET ORAL EVERY 4 HOURS PRN
Qty: 200 TABLET | Refills: 0 | Status: SHIPPED | OUTPATIENT
Start: 2024-12-18

## 2024-12-18 NOTE — TELEPHONE ENCOUNTER
Received [employer] message from patient requesting refill of oxycodone.     Last refill: 11/25/24  Last office visit: 10/22/24  Scheduled for follow up 1/21/25     Will route request to MD/ for review.     Reviewed [place]  Report.

## 2024-12-23 ENCOUNTER — INFUSION THERAPY VISIT (OUTPATIENT)
Dept: ONCOLOGY | Facility: CLINIC | Age: 49
End: 2024-12-23
Attending: INTERNAL MEDICINE
Payer: MEDICARE

## 2024-12-23 VITALS
BODY MASS INDEX: 33.01 KG/M2 | OXYGEN SATURATION: 100 % | TEMPERATURE: 98.2 F | WEIGHT: 236.6 LBS | RESPIRATION RATE: 18 BRPM | SYSTOLIC BLOOD PRESSURE: 122 MMHG | DIASTOLIC BLOOD PRESSURE: 78 MMHG | HEART RATE: 76 BPM

## 2024-12-23 DIAGNOSIS — C79.51 CARCINOMA OF LEFT BREAST METASTATIC TO BONE (H): ICD-10-CM

## 2024-12-23 DIAGNOSIS — C50.812 MALIGNANT NEOPLASM OF OVERLAPPING SITES OF LEFT BREAST IN FEMALE, ESTROGEN RECEPTOR POSITIVE (H): Primary | ICD-10-CM

## 2024-12-23 DIAGNOSIS — Z17.0 MALIGNANT NEOPLASM OF OVERLAPPING SITES OF LEFT BREAST IN FEMALE, ESTROGEN RECEPTOR POSITIVE (H): Primary | ICD-10-CM

## 2024-12-23 DIAGNOSIS — C50.912 CARCINOMA OF LEFT BREAST METASTATIC TO BONE (H): ICD-10-CM

## 2024-12-23 LAB
ALBUMIN SERPL BCG-MCNC: 4.1 G/DL (ref 3.5–5.2)
ALP SERPL-CCNC: 87 U/L (ref 40–150)
ALT SERPL W P-5'-P-CCNC: 8 U/L (ref 0–50)
ANION GAP SERPL CALCULATED.3IONS-SCNC: 13 MMOL/L (ref 7–15)
AST SERPL W P-5'-P-CCNC: 15 U/L (ref 0–45)
BASOPHILS # BLD AUTO: 0 10E3/UL (ref 0–0.2)
BASOPHILS NFR BLD AUTO: 1 %
BILIRUB SERPL-MCNC: <0.2 MG/DL
BUN SERPL-MCNC: 11.4 MG/DL (ref 6–20)
CALCIUM SERPL-MCNC: 9.3 MG/DL (ref 8.8–10.4)
CANCER AG15-3 SERPL-ACNC: 50 U/ML
CEA SERPL-MCNC: 4.2 NG/ML
CHLORIDE SERPL-SCNC: 106 MMOL/L (ref 98–107)
CREAT SERPL-MCNC: 1.18 MG/DL (ref 0.51–0.95)
EGFRCR SERPLBLD CKD-EPI 2021: 56 ML/MIN/1.73M2
EOSINOPHIL # BLD AUTO: 0.1 10E3/UL (ref 0–0.7)
EOSINOPHIL NFR BLD AUTO: 2 %
ERYTHROCYTE [DISTWIDTH] IN BLOOD BY AUTOMATED COUNT: 13.1 % (ref 10–15)
GLUCOSE SERPL-MCNC: 126 MG/DL (ref 70–99)
HCO3 SERPL-SCNC: 23 MMOL/L (ref 22–29)
HCT VFR BLD AUTO: 32.9 % (ref 35–47)
HGB BLD-MCNC: 11 G/DL (ref 11.7–15.7)
IMM GRANULOCYTES # BLD: 0 10E3/UL
IMM GRANULOCYTES NFR BLD: 0 %
LYMPHOCYTES # BLD AUTO: 1.2 10E3/UL (ref 0.8–5.3)
LYMPHOCYTES NFR BLD AUTO: 40 %
MCH RBC QN AUTO: 33.3 PG (ref 26.5–33)
MCHC RBC AUTO-ENTMCNC: 33.4 G/DL (ref 31.5–36.5)
MCV RBC AUTO: 100 FL (ref 78–100)
MONOCYTES # BLD AUTO: 0.2 10E3/UL (ref 0–1.3)
MONOCYTES NFR BLD AUTO: 6 %
NEUTROPHILS # BLD AUTO: 1.5 10E3/UL (ref 1.6–8.3)
NEUTROPHILS NFR BLD AUTO: 51 %
NRBC # BLD AUTO: 0 10E3/UL
NRBC BLD AUTO-RTO: 0 /100
PLAT MORPH BLD: NORMAL
PLATELET # BLD AUTO: 189 10E3/UL (ref 150–450)
POTASSIUM SERPL-SCNC: 3.8 MMOL/L (ref 3.4–5.3)
PROT SERPL-MCNC: 7.6 G/DL (ref 6.4–8.3)
RBC # BLD AUTO: 3.3 10E6/UL (ref 3.8–5.2)
RBC MORPH BLD: NORMAL
SODIUM SERPL-SCNC: 142 MMOL/L (ref 135–145)
WBC # BLD AUTO: 3.1 10E3/UL (ref 4–11)

## 2024-12-23 PROCEDURE — 80053 COMPREHEN METABOLIC PANEL: CPT

## 2024-12-23 PROCEDURE — 96402 CHEMO HORMON ANTINEOPL SQ/IM: CPT

## 2024-12-23 PROCEDURE — 36415 COLL VENOUS BLD VENIPUNCTURE: CPT

## 2024-12-23 PROCEDURE — 85025 COMPLETE CBC W/AUTO DIFF WBC: CPT

## 2024-12-23 PROCEDURE — 250N000011 HC RX IP 250 OP 636: Mod: JZ | Performed by: PHYSICIAN ASSISTANT

## 2024-12-23 PROCEDURE — 82378 CARCINOEMBRYONIC ANTIGEN: CPT

## 2024-12-23 PROCEDURE — 86300 IMMUNOASSAY TUMOR CA 15-3: CPT

## 2024-12-23 RX ORDER — LAMOTRIGINE 25 MG/1
500 TABLET ORAL ONCE
Status: COMPLETED | OUTPATIENT
Start: 2024-12-23 | End: 2024-12-23

## 2024-12-23 RX ADMIN — FULVESTRANT 500 MG: 50 INJECTION, SOLUTION INTRAMUSCULAR at 15:20

## 2024-12-23 ASSESSMENT — PAIN SCALES - GENERAL: PAINLEVEL_OUTOF10: SEVERE PAIN (6)

## 2024-12-23 NOTE — PROGRESS NOTES
Infusion Injection Note:  Teresa Sanderson presents today for Faslodex injections.    Patient seen by provider today: No, provider visit on 12/5   present during visit today: Not Applicable.    Rayna Slater, RN checked in with patient prior to injection.     Treatment Conditions:  Not Applicable.    Pre and Post Injection:  Faslodex injections given concurrently to bilateral ventrogluteal sites by writer and RN without incident.   Patient tolerated procedure well.    Discharge Plan:  Patient declined prescription refills.  Discharge instructions reviewed with: Patient.  Patient and/or family verbalized understanding of discharge instructions and all questions answered.  AVS to patient via [employer].    Patient discharged in stable condition accompanied by: daughter.  Departure Mode: Ambulatory.  Patient will return 1/20/2025 for next appointment.    Eloisa JONES on 12/23/2024 at 3:16 PM

## 2024-12-24 ENCOUNTER — MYC MEDICAL ADVICE (OUTPATIENT)
Dept: ONCOLOGY | Facility: CLINIC | Age: 49
End: 2024-12-24

## 2024-12-24 ENCOUNTER — OFFICE VISIT (OUTPATIENT)
Dept: INTERNAL MEDICINE | Facility: CLINIC | Age: 49
End: 2024-12-24
Payer: MEDICARE

## 2024-12-24 VITALS
DIASTOLIC BLOOD PRESSURE: 82 MMHG | OXYGEN SATURATION: 100 % | RESPIRATION RATE: 16 BRPM | WEIGHT: 237.8 LBS | TEMPERATURE: 98 F | HEART RATE: 72 BPM | BODY MASS INDEX: 33.29 KG/M2 | SYSTOLIC BLOOD PRESSURE: 129 MMHG | HEIGHT: 71 IN

## 2024-12-24 DIAGNOSIS — G89.3 CANCER ASSOCIATED PAIN: ICD-10-CM

## 2024-12-24 DIAGNOSIS — F25.0 SCHIZOAFFECTIVE DISORDER, BIPOLAR TYPE (H): Primary | ICD-10-CM

## 2024-12-24 DIAGNOSIS — C79.51 METASTASIS TO BONE (H): ICD-10-CM

## 2024-12-24 DIAGNOSIS — G47.00 INSOMNIA, UNSPECIFIED TYPE: ICD-10-CM

## 2024-12-24 DIAGNOSIS — Z17.0 MALIGNANT NEOPLASM OF OVERLAPPING SITES OF LEFT BREAST IN FEMALE, ESTROGEN RECEPTOR POSITIVE (H): ICD-10-CM

## 2024-12-24 DIAGNOSIS — C50.812 MALIGNANT NEOPLASM OF OVERLAPPING SITES OF LEFT BREAST IN FEMALE, ESTROGEN RECEPTOR POSITIVE (H): ICD-10-CM

## 2024-12-24 NOTE — ORAL ONC MGMT
Oral Chemotherapy Monitoring Program  Lab Follow Up    Reviewed lab results from 12/23/24.        9/13/2024     2:00 PM 10/22/2024    11:00 AM 10/31/2024    10:00 AM 11/7/2024     9:00 AM 11/7/2024     4:00 PM 12/6/2024     4:00 PM 12/24/2024     8:00 AM   ORAL CHEMOTHERAPY   Assessment Type Refill Monthly Follow up Lab Monitoring;Left Voicemail Refill Monthly Follow up Refill Lab Monitoring   Diagnosis Code Breast Cancer Breast Cancer Breast Cancer Breast Cancer Breast Cancer Breast Cancer Breast Cancer   Providers Dr. Dimitrios Plunkett   [facility]   Is this patient followed by the [facility] OC team?  No No No No No No   Drug Name Verzenio (abemaciclib) Verzenio (abemaciclib) Verzenio (abemaciclib) Verzenio (abemaciclib) Verzenio (abemaciclib) Verzenio (abemaciclib) Verzenio (abemaciclib)   Dose  100 mg 100 mg 100 mg 100 mg 100 mg 100 mg   Current Schedule  BID BID BID BID BID BID   Cycle Details  Continuous Continuous Continuous Continuous Continuous Continuous   Adverse Effects     Fatigue     Fatigue     Grade 2     Pharmacist Intervention(fatigue)     No         Labs:  _  Result Component Current Result Ref Range   Sodium 142 (12/23/2024) 135 - 145 mmol/L     _  Result Component Current Result Ref Range   Potassium 3.8 (12/23/2024) 3.4 - 5.3 mmol/L     _  Result Component Current Result Ref Range   Calcium 9.3 (12/23/2024) 8.8 - 10.4 mg/dL          _  Result Component Current Result Ref Range   Magnesium 1.7 (12/5/2024) 1.7 - 2.3 mg/dL     No results found for Phos within last 30 days.     _  Result Component Current Result Ref Range   Albumin 4.1 (12/23/2024) 3.5 - 5.2 g/dL     _  Result Component Current Result Ref Range   Urea Nitrogen 11.4 (12/23/2024) 6.0 - 20.0 mg/dL     _  Result Component Current Result Ref Range   Creatinine 1.18 (H) (12/23/2024) 0.51 - 0.95 mg/dL      _  Result Component Current Result Ref Range   AST 15 (12/23/2024) 0 - 45 U/L     _  Result Component Current Result Ref Range   ALT 8 (12/23/2024) 0 - 50 U/L     _  Result Component Current Result Ref Range   Bilirubin Total <0.2 (12/23/2024) <=1.2 mg/dL     _  Result Component Current Result Ref Range   WBC Count 3.1 (L) (12/23/2024) 4.0 - 11.0 10e3/uL     _  Result Component Current Result Ref Range   Hemoglobin 11.0 (L) (12/23/2024) 11.7 - 15.7 g/dL     _  Result Component Current Result Ref Range   Platelet Count 189 (12/23/2024) 150 - 450 10e3/uL     No results found for ANC within last 30 days.     _  Result Component Current Result Ref Range   Absolute Neutrophils 1.5 (L) (12/23/2024) 1.6 - 8.3 10e3/uL        Assessment & Plan:  Results are concerning for slight increase in Scr, Mychart to patient to increase fluids, will monitor.      Follow-Up:  1/20 Labs and Dr Plunkett visit    Nadya Broderick, PharmD, BCPS  Oral Chemotherapy Monitoring Program  [facility]  [phone]

## 2024-12-24 NOTE — PROGRESS NOTES
Assessment & Plan   Teresa Sanderson is a 48 yo female with known metastatic breast cancer, currently seen by onc and palliative for mgmt, coming in for recheck on mental health, sleeping, medications, and pain. Is doing very well today and appears to be following with her specialists on a more regular basis with a slightly improved sleeping schedule. Plan as below.     Schizoaffective disorder, bipolar type (H)  Insomnia, unspecified type  Saw psychiatry November 2024, Seroquel increased to 500mg at bedtime with 50mg prns available. Has been taking 100mg Seroquel with the Ramelteon but is never able to fall asleep after this so will then take her 400mg Seroquel later. She is then able to fall asleep. Has not been using prns. Given lack of impact of Ramelteon and spaced out timing of Seroquel dosing, have agreed to discontinue Ramelteon and asked her to make sure that she is taking her Seroquel 500mg at one time rather than spaced out at night. Encouraged close follow up with psychiatry regarding this, who may need to reach out to her to ensure follow up. I am reassured that her PHQ9 has gone from 14 to 11 at today's visit compared to last, and assured her that I feel that we are making good progress with her care overall, which she agrees with.   Will see pt again in three months for physical and to revisit current MH cares. She has the information to schedule with talk therapy per psychiatry available in her AVS from her appt in November and I encouraged Teresa to make sure that her children have access to [employer] to help her make this new appt with therapy in addition to assisting her with scheduling important and necessary follow ups with psychiatry. She is in agreement -- filled out [employer] paperwork for her children's access together in October and she has yet to give it to them so I reminded her of this today.   -- STOP Ramelteon  -- Continue 500mg Seroquel at bedtime   -- F/up with psychiatry, therapy    -- Important that her children who act as her PCAs assist her with this    Malignant neoplasm of overlapping sites of left breast in female, estrogen receptor positive (H)  Metastasis to bone (H)  Cancer associated pain  Feels that her pain has improved, particularly in regards to the cramps she was experiencing in her legs, after increasing her hydration. Her daughter and her sister have been assisting her with this and she is now back to taking 2 tabs Oxycodone QID. She had some constipation with the increase in her Oxycodone but this has now resolved with increased fluid intake and going back down on the Oxycodone dosing. Continues to follow with Palliative; I agree that Cymbalta would likely be a better choice for this pt compared to Sertraline and encourage Psychiatry to make that adjustment at her follow up visit with them. Teresa reaffirms that she had success with Cymbalta in the past regarding her mood and her pain, is not sure why it was stopped.   Regarding her cancer itself, d/w [place] today the importance of making it to her EGD that she has scheduled 3/7/2025 given the increased FDG uptake on her scans within her stomach. She was not clear on why she needed the EGD but affirmed understanding today and wrote down the date of the appt in her phone to make sure that she does not miss it given the potential changes this would have for her diagnosis. No GI complaints in terms of reflux, stomach pain, increased nausea, etc which is reassuring. Doing well after her treatment yesterday.   -- Follow up with Onc, Palliative as scheduled   -- EGD 3/7/2025  -- Encourage psych to consider change from Sertraline to Cymbalta       Nicotine/Tobacco Cessation  She reports that she has been smoking cigarettes. She started smoking about 13 years ago. She has a 3.4 pack-year smoking history. She has been exposed to tobacco smoke. She has never used smokeless tobacco.  Nicotine/Tobacco Cessation Plan  Self help  "information given to patient      BMI  Estimated body mass index is 33.18 kg/m  as calculated from the following:    Height as of this encounter: 1.803 m (5' 10.98\").    Weight as of this encounter: 107.9 kg (237 lb 12.8 oz).     Depression Screening Follow Up      10/29/2024    11:05 AM 11/21/2024     2:19 PM 12/24/2024     9:13 AM   PHQ   PHQ-9 Total Score 14 14 11    Q9: Thoughts of better off dead/self-harm past 2 weeks Not at all Not at all Not at all       Patient-reported       Follow Up Actions Taken  Patient counseled, no additional follow up at this time.  Depression Action Plan reviewed with patient.  Referred patient back to current mental health provider.       Patient Instructions   -- Take your 100mg and 400mg Seroquel together at night  -- STOP the Ramelteon since it now helping you   -- Make a vision board with your daughter, grandchildren, and your other children on New Years for 2025. I encourage you to continue finding things that inspire you with goals, quotes and put that on your vision board. You can hang this up next to your 2023 vision board. I would love to see pictures!   -- Try to get to [facility] in the New Year - this would be VERY good for you  -- I encourage you to schedule with the therapist via the phone number that psychiatry gave you. This is in your MyChart.   -- We filled out paperwork in November to give your children access to [employer] -- SET THIS UP TODAY 12/24 WITH YOUR DAUGHTER  -- Your endoscopy to look at your stomach with a scope is 3/7/2025 -- it is important that you do not miss this.     Return in about 3 months (around 3/24/2025) for Follow up, with me, Routine preventive.    Trung Moore is a 49 year old, presenting for the following health issues:  Follow Up      12/24/2024     9:11 AM   Additional Questions   Roomed by [facility] EMT     Mood:  Saw psychiatry in November and her Seroquel was increased to 500mg but she has been taking 100mg with the Ramelteon before " "taking 400mg later in the night. She is not using the as needed dosing. In addition, she is still taking Sertraline 50mg. Feels that her sleep is better once she has the total dosing of Seroquel in her system after the 400mg.     Was feeling sad last week thinking about her son missing the holidays. Is feeling better this week, trying to stay positive thinking about the New Year. She has been reviewing a vision board she made with her son in 2023 with positive quotes, reminder and this is keeping her positive and keeping her close to her son as well. Her grandchildren are also keeping her very positive and happy.     She is planning on making a mood board with her grandchildren and daughter for the new year.    Additionally, she states that her cat has been more active with her and he's \"her baby.\" This is keeping her happy.     Stomach concerns:  Has an EGD 3/2025 given FDG uptake in her stomach on several PETs.     Notes that she has been having some constipation and is taking stool softeners to assist with this. It is generic medication from [employer]. This was in the same time line of her increasing her oxycodone. Now, she has decreased the oxycodone again to take 2 tablets 4x per day as prescribed.     Cramps:  Reports that she recently experienced muscle cramps starting her in lower leg going up through her thigh. However, when she started drinking more water this helped and she has not had further cramps. Affirms that she is drinking 64oz of water a day. She thinks that this has made a big difference in her overall mood, pain, and wellbeing.     Care:  States that her daughter has been making sure she stays hydrated, getting her medications, and getting to her appointments. She feels positive today given the help she has from her daughter and her sister.     Social:  She got public housing with her daughter and she will have a new backyard to \"sit in and enjoy the summer\" in. She is making plans with her " "daughter for the new space -- daughter and daughter's two children will be living with her.     History of Present Illness       Reason for visit:  Follow up   She is taking medications regularly.           Objective    /82 (BP Location: Right arm, Patient Position: Sitting, Cuff Size: Adult Large)   Pulse 72   Temp 98  F (36.7  C) (Oral)   Resp 16   Ht 1.803 m (5' 10.98\")   Wt 107.9 kg (237 lb 12.8 oz)   SpO2 100%   BMI 33.18 kg/m    Body mass index is 33.18 kg/m .  Physical Exam  Vitals and nursing note reviewed.   Constitutional:       General: She is not in acute distress.     Appearance: Normal appearance.   HENT:      Head: Normocephalic.   Cardiovascular:      Rate and Rhythm: Normal rate and regular rhythm.   Pulmonary:      Effort: Pulmonary effort is normal.      Breath sounds: Normal breath sounds.   Musculoskeletal:         General: No tenderness. Normal range of motion.      Right lower leg: No edema.      Left lower leg: No edema.   Neurological:      General: No focal deficit present.      Mental Status: She is alert and oriented to person, place, and time.   Psychiatric:         Attention and Perception: Attention normal.         Mood and Affect: Mood is elated.         Speech: Speech is tangential.         Behavior: Behavior normal.         Thought Content: Thought content normal.         Judgment: Judgment normal.              Signed Electronically by: Lavonne Frazier MD    "

## 2024-12-24 NOTE — PATIENT INSTRUCTIONS
-- Take your 100mg and 400mg Seroquel together at night  -- STOP the Ramelteon since it now helping you   -- Make a vision board with your daughter, grandchildren, and your other children on New Years for 2025. I encourage you to continue finding things that inspire you with goals, quotes and put that on your vision board. You can hang this up next to your 2023 vision board. I would love to see pictures!   -- Try to get to [place] in the New Year - this would be VERY good for you  -- I encourage you to schedule with the therapist via the phone number that psychiatry gave you. This is in your MyChart.   -- We filled out paperwork in November to give your children access to [employer] -- SET THIS UP TODAY 12/24 WITH YOUR DAUGHTER  -- Your endoscopy to look at your stomach with a scope is 3/7/2025 -- it is important that you do not miss this.

## 2024-12-26 DIAGNOSIS — C79.51 CARCINOMA OF LEFT BREAST METASTATIC TO BONE (H): Primary | ICD-10-CM

## 2024-12-26 DIAGNOSIS — C50.912 CARCINOMA OF LEFT BREAST METASTATIC TO BONE (H): Primary | ICD-10-CM

## 2024-12-26 DIAGNOSIS — C50.812 MALIGNANT NEOPLASM OF OVERLAPPING SITES OF LEFT BREAST IN FEMALE, ESTROGEN RECEPTOR POSITIVE (H): ICD-10-CM

## 2024-12-26 DIAGNOSIS — Z17.0 MALIGNANT NEOPLASM OF OVERLAPPING SITES OF LEFT BREAST IN FEMALE, ESTROGEN RECEPTOR POSITIVE (H): ICD-10-CM

## 2024-12-26 RX ORDER — LAMOTRIGINE 25 MG/1
500 TABLET ORAL ONCE
OUTPATIENT
Start: 2024-12-29 | End: 2024-12-29

## 2025-01-02 ENCOUNTER — MYC MEDICAL ADVICE (OUTPATIENT)
Dept: ONCOLOGY | Facility: CLINIC | Age: 50
End: 2025-01-02
Payer: MEDICARE

## 2025-01-02 DIAGNOSIS — C50.812 MALIGNANT NEOPLASM OF OVERLAPPING SITES OF LEFT BREAST IN FEMALE, ESTROGEN RECEPTOR POSITIVE (H): ICD-10-CM

## 2025-01-02 DIAGNOSIS — C79.51 CARCINOMA OF LEFT BREAST METASTATIC TO BONE (H): Primary | ICD-10-CM

## 2025-01-02 DIAGNOSIS — C50.912 CARCINOMA OF LEFT BREAST METASTATIC TO BONE (H): Primary | ICD-10-CM

## 2025-01-02 DIAGNOSIS — Z17.0 MALIGNANT NEOPLASM OF OVERLAPPING SITES OF LEFT BREAST IN FEMALE, ESTROGEN RECEPTOR POSITIVE (H): ICD-10-CM

## 2025-01-02 NOTE — ORAL ONC MGMT
Oral Chemotherapy Monitoring Program     Placed call to Teresa Sanderson in follow up of abemaciclib oral chemotherapy.   This call was to check in on adherence as [facility] notified us last fill was 11/12/24 for a 28 day supply. They have reached out many times to set up next refill with no response.  Left a message requesting a call back. No drug names were mentioned in the voicemail. I also sent a MyC message today. Will update when response is received.    Pearl Garcia, PharmD  Hematology/Oncology Clinical Pharmacist  Oral Chemotherapy Monitoring Program  [facility]  [phone]  January 2, 2025

## 2025-01-06 ENCOUNTER — VIRTUAL VISIT (OUTPATIENT)
Dept: PSYCHIATRY | Facility: CLINIC | Age: 50
End: 2025-01-06
Attending: STUDENT IN AN ORGANIZED HEALTH CARE EDUCATION/TRAINING PROGRAM
Payer: MEDICARE

## 2025-01-06 DIAGNOSIS — G47.00 INSOMNIA, UNSPECIFIED TYPE: ICD-10-CM

## 2025-01-06 DIAGNOSIS — F25.0 SCHIZOAFFECTIVE DISORDER, BIPOLAR TYPE (H): Primary | ICD-10-CM

## 2025-01-06 DIAGNOSIS — F41.9 ANXIETY DISORDER, UNSPECIFIED TYPE: ICD-10-CM

## 2025-01-06 PROCEDURE — 90836 PSYTX W PT W E/M 45 MIN: CPT | Mod: 4UV

## 2025-01-06 PROCEDURE — 98006 SYNCH AUDIO-VIDEO EST MOD 30: CPT | Mod: GC

## 2025-01-06 PROCEDURE — G2211 COMPLEX E/M VISIT ADD ON: HCPCS | Mod: 95

## 2025-01-06 RX ORDER — SERTRALINE HYDROCHLORIDE 100 MG/1
50 TABLET, FILM COATED ORAL DAILY
Qty: 30 TABLET | Refills: 1 | Status: SHIPPED | OUTPATIENT
Start: 2025-01-06

## 2025-01-06 RX ORDER — QUETIAPINE FUMARATE 100 MG/1
TABLET, FILM COATED ORAL
Qty: 30 TABLET | Refills: 2 | Status: SHIPPED | OUTPATIENT
Start: 2025-01-06

## 2025-01-06 RX ORDER — QUETIAPINE FUMARATE 50 MG/1
25-50 TABLET, FILM COATED ORAL 2 TIMES DAILY PRN
Qty: 60 TABLET | Refills: 2 | Status: SHIPPED | OUTPATIENT
Start: 2025-01-06

## 2025-01-06 RX ORDER — OLANZAPINE 5 MG/1
TABLET ORAL
Qty: 30 TABLET | Refills: 1 | Status: SHIPPED | OUTPATIENT
Start: 2025-01-06

## 2025-01-06 RX ORDER — QUETIAPINE FUMARATE 400 MG/1
TABLET, FILM COATED ORAL
Qty: 30 TABLET | Refills: 2 | Status: SHIPPED | OUTPATIENT
Start: 2025-01-06

## 2025-01-06 ASSESSMENT — PAIN SCALES - GENERAL: PAINLEVEL_OUTOF10: EXTREME PAIN (9)

## 2025-01-06 ASSESSMENT — ANXIETY QUESTIONNAIRES
8. IF YOU CHECKED OFF ANY PROBLEMS, HOW DIFFICULT HAVE THESE MADE IT FOR YOU TO DO YOUR WORK, TAKE CARE OF THINGS AT HOME, OR GET ALONG WITH OTHER PEOPLE?: EXTREMELY DIFFICULT
7. FEELING AFRAID AS IF SOMETHING AWFUL MIGHT HAPPEN: NEARLY EVERY DAY
4. TROUBLE RELAXING: NEARLY EVERY DAY
GAD7 TOTAL SCORE: 18
3. WORRYING TOO MUCH ABOUT DIFFERENT THINGS: NEARLY EVERY DAY
GAD7 TOTAL SCORE: 18
IF YOU CHECKED OFF ANY PROBLEMS ON THIS QUESTIONNAIRE, HOW DIFFICULT HAVE THESE PROBLEMS MADE IT FOR YOU TO DO YOUR WORK, TAKE CARE OF THINGS AT HOME, OR GET ALONG WITH OTHER PEOPLE: EXTREMELY DIFFICULT
2. NOT BEING ABLE TO STOP OR CONTROL WORRYING: NEARLY EVERY DAY
6. BECOMING EASILY ANNOYED OR IRRITABLE: MORE THAN HALF THE DAYS
7. FEELING AFRAID AS IF SOMETHING AWFUL MIGHT HAPPEN: NEARLY EVERY DAY
GAD7 TOTAL SCORE: 18
1. FEELING NERVOUS, ANXIOUS, OR ON EDGE: NEARLY EVERY DAY
5. BEING SO RESTLESS THAT IT IS HARD TO SIT STILL: SEVERAL DAYS

## 2025-01-06 ASSESSMENT — PATIENT HEALTH QUESTIONNAIRE - PHQ9
SUM OF ALL RESPONSES TO PHQ QUESTIONS 1-9: 7
SUM OF ALL RESPONSES TO PHQ QUESTIONS 1-9: 7
10. IF YOU CHECKED OFF ANY PROBLEMS, HOW DIFFICULT HAVE THESE PROBLEMS MADE IT FOR YOU TO DO YOUR WORK, TAKE CARE OF THINGS AT HOME, OR GET ALONG WITH OTHER PEOPLE: EXTREMELY DIFFICULT

## 2025-01-06 NOTE — NURSING NOTE
Is the patient currently in the state of [place]? YES    Current patient location: [address]   [facility] [address]    Visit mode:VIDEO    If the visit is dropped, the patient can be reconnected by: VIDEO VISIT: Text to cell phone:   Telephone Information:   Mobile [phone]       Will anyone else be joining the visit? No  (If patient encounters technical issues they should call [phone])    Are changes needed to the allergy or medication list? No    Are refills needed on medications prescribed by this physician? No    Rooming Documentation: Questionnaire(s) completed.    Reason for visit: No chief complaint on file.     ALISA Jose

## 2025-01-06 NOTE — PATIENT INSTRUCTIONS
**For crisis resources, please see the information at the end of this document**   Patient Education    Thank you for coming to the [facility] [place] [facility].     Today's change :  -Start olanzapine 1-2 tablets (5-10mg) as needed at bedtime for sleep, mood   -Can take olanzapine 1/2 tablet (2.5mg) as needed during the day for sleep, mood    -continue quetiapine 500mg at bedtime and 50-100mg as needed during the day for mood, anxiety   -continue sertraline 50mg daily for mood       Lab Testing:  If you had lab testing today and your results are reassuring or normal they will be mailed to you or sent through [employer] within 7 days. If the lab tests need quick action we will call you with the results. The phone number we will call with results is # [phone]. If this is not the best number please call our clinic and change the number.     Medication Refills:  If you need any refills please call your pharmacy and they will contact us. Our fax number for refills is [phone].   Three business days of notice are needed for general medication refill requests.   Five business days of notice are needed for controlled substance refill requests.   If you need to change to a different pharmacy, please contact the new pharmacy directly. The new pharmacy will help you get your medications transferred.     Contact Us:  Please call [phone] during business hours (8-5:00 M-F).   If you have medication related questions after clinic hours, or on the weekend, please call [phone].     Financial Assistance [phone]   Medical Records [phone]       MENTAL HEALTH CRISIS RESOURCES:  For a emergency help, please call 911 or go to the nearest Emergency Department.     Emergency Walk-In Options:   [facility] Unit @ [facility] ([place]): [phone] - Specialized mental health emergency area designed to be calming  [facility] ([place]):  [phone]  [facility] ([place]): [phone]  [facility] ([place]): [phone]    [facility] Crisis Information:   Julienne: [phone]  Iván: [phone]  Kayla ([place]) - Adult: [phone]     Child: [phone]  [place]oly - Adult: [phone]     Child: [phone]  [place]: [phone]  List of all [facility] resources:   https://[place].gov/dhs/people-we-serve/adults/health-care/mental-health/resources/crisis-contacts.jsp    National Crisis Information:   Crisis Text Line: Text  [place]  to 144248  Suicide & Crisis Lifeline: 988  National Suicide Prevention Lifeline: 7-150-659-TALK (1-138.119.3816)       For online chat options, visit https://suicidepreventionlifeline.org/chat/  Poison Control Center: 1-110.741.9356  Trans Lifeline: 4-719-203-9428 - Hotline for transgender people of all ages  The [employer]: 5-168-018-8210 - Hotline for LGBT youth     For Non-Emergency Support:   Fast Tracker: Mental Health & Substance Use Disorder Resources -   https://www.[employer].org/

## 2025-01-06 NOTE — PROGRESS NOTES
Virtual Visit Details    Type of service:  Video Visit     Originating Location (pt. Location): Home    Distant Location (provider location):  On-site  Platform used for Video Visit: [facility]  MEDICAL PROGRESS NOTE     CARE TEAM:    PCP- Lavonne Frazier  Therapist- None   Oncology- Margot Plunkett MD   Palliative care: Ayanna Frank DO       Teresa is a 49 year old who uses the pronouns she, her, hers.      Assessment     Schizoaffective disorder, bipolar type, most recent episode hypomanic       JENNIFFER with panic attacks   Insomnia, unspecified   R/o PTSD    Metastatic breast cancer (dx 12/2020) s/p RT to lumbar spine and RFA /kyphoplasty L4   Bone metastases / low back pain with LLE sciatica   Tobacco dependence     Teresa Sanderson is a 49 year old female with past psychiatric diagnosis of schizoaffective disorder, bipolar type and medical history pertinent for metastatic breast cancer. She was referred to psychiatry clinic by her oncologist following diagnosis of metastatic breast cancer in 12/2020. Daughter is her PCA. Biological contribution to mental illness includes chronic health issues, genetic loading of schizophrenia, childhood trauma, and history of substance use. Psychological contribution to mental illness includes multiple psychiatric hospitalizations. Social stressors include loss of her son, financial constraints.     Today, Teresa described low mood and energy. She had recent symptoms of what sound like hypomania over the Christmas holiday that lasted for couple of days. She continues to feel anxious/ have worrisome thoughts about her health and fear of dying. She does not have suicidal ideation. Changes in quetiapine was not helpful for her sleep requiring her to take additional 100mg PRN. Ramelteon 8mg was discontinued by IM during their latest appointment due to ineffectiveness. She did  not  Mirtazapine 15mg that was prescribed last visit. She denied barriers to picking up medications. When asked about cymbalta that was mentioned in IM's note, she had trouble recalling the effect of the medication. She brought up past trials of mood stabilizers and said a medication's name that sounded like clozapine. Reviewed other mood stabilizers and lithium and depakote reportedly stopped working. Given ongoing insomnia that appears to be most distressing symptom at this time, we recommended starting olanzapine 5-10mg at bedtime to target mood stabilization, psychosis, appetite, and sleep. Per chart, she found olanzapine helpful in the past. She was instructed to continue other medications as prescribed. She was encouraged to repeat the changes we made today and struggled initially but eventually understood the plans. Will plan to assess her response to olanzapine by phone/ MyChart check-in in 1-2 weeks. Next step will be to complete sertraline taper while adding mirtazapine at lower dose to further address depression and sleep or start another mood stabilizer like lamictal to target mood fluctuations with less risks of switching depression/ hypomania into tom compared to antidepressant alone. Though, quetiapine that she has been taking should also serves as a mood stabilizer. Additionally, she was encouraged to establish care with a therapist.     Psychotropic Drug Interactions:  [PSYCHCLINICDDI]  ADDITIVE SEROTONERGIC: quetiapine, sertraline  ADDITIVE SEDATION: oxycodone, robaxin, seroquel, olanzapine  Management: Routine Monitoring and patient is aware of risks.     [place] today:  taking controlled substances as prescribed    Risk Statements:   Treatment Risk- Risks, benefits, alternatives and potential adverse effects have been discussed and are understood.   Safety Risk-Cleaster did not appear to be an imminent safety risk to self or others. We discussed safety plan and resources, and  Teresa agreed to contact this clinic or seek emergency services if he experiences any safety concerns, worsened symptoms or medication side effects. Risk factors for self-harm include hallucinations and chronic medical issues.  Mitigating factors:commitment to family, good social support   and stable housing.  The patient does not appear to be at imminent risk for self-harm, hospitalization is not recommended which the pt does agree with. No hospitalization will be arranged. Based on degree of symptoms, close psych follow-up was/were recommended which the pt does  agree to. Additional steps to minimize risk: follow-up call/ MyChart in 1-2 weeks and frequent clinic visits.        Plan     1) Medications:  -Start Olanzapine 5-10mg at bedtime for sleep and mood stabilization (can take 2.5mg daily PRN for breakthrough hypomania/ tom)   -Continue Quetiapine to 500mg at bedtime (patient takes 400-500mgmg at bedtime and 100mg once she wakes up early morning)   -Discontinue Mirtazapine 7.5mg at bedtime (patient didn't  so never started)   -Continue Sertraline to 50mg (consider discontinuing in future session)       Future considerations:   -lamotrigine (if bipolar depression)   -retry lithium (if tom is more prominent)   -add mirtazapine 7.5mg at bedtime after mood stabilizer is optimized   -continue sertraline taper (25mg then stop) next visit     Prescribed outside of psychiatry clinic:   - Ramelteon 8mg (discontinued by medicine on 12/24 due to ineffectiveness) Patient thought Ramelteon was Mirtazapine (Rameron).   - Gabapentin 600 mg TID   - Robaxin 500 mg TID PRN for muscle spasms  - Rivaroxaban 20mg daily  - Oxycodone 5-10mg Q4H PRN   - Abemaciclib 100mg BID    - Fulvestrant injection 500mg   - Vitamin D3 25mcg daily       2) Psychotherapy: encouraged patient to schedule appointment with therapist.      3) Next due:  Labs- routinely monitor atypical neuroleptic labs - labs up to date as of 9/11/2024 and  "10/2024.  EKG- Routine monitoring is not indicated for current psychotropic medication regimen   Rating scales- AIMS: due at next in-person appointment    4) Referrals: none     5) Other: outreach by nursing partner in 2 weeks to check on symptoms and response to olanzapine. Plans to coordinate care with her daughter, who is a PCA due to concern for trouble remembering her medication changes.     6) Follow-up: Return to clinic in 4 weeks      Pertinent Background                                                   [most recent eval 7/19/24]       Cleaster \"Cleas\" Kin first experienced \"rapid mood shifts\" since childhood. She had 7-8 hospitalizations in [place].     [place], she was diagnosed with bipolar disorder. Started on Seroquel 100mg TID for years. In 1999 mood worsened from baseline requiring acute hospitalization [place] followed by day treatment. During this treatment her diagnosis changed from bipolar disorder to schizoaffective disorder. She had another hospitalization in 2020 for what appears to be a full manic episode. At that time, stressors include helping daughter who was a victim of domestic violence.      Around that time, she had severe back pain and was diagnosed with metastatic breast cancer (L4, L5, lt illiac, paraspinal) was diagnosed in 12/2020. Zoloft started after the cancer diagnosis. Belinda, her daughter and her 2 grandchildren moved to [place] in 2021. Seroquel 100mg TID was stopped August 2021 (not helping), doxepin and Ambien tried for sleep-neither helped. Lost her son to accidental overdose 3/25/2022.     Medically, 1/2021 was started on Ibrance, zoladex, and anastrozole; 5/17/21- s/p radiofrequency ablation, keloplasty/ segmental plasty of L4; 7/2021- showing complete response to treatment.     When she first presented to our clinic, symptoms were pertinent for low energy, anhedonia, mood fluctuations (mostly low), worry/ anxiety, panic attacks, commanding auditory hallucinations. Denied visual " "hallucinations and paranoia.  Recently she had exhibited elevated mood, increased energy, rapid speech, increased goal-directed activities that raise concerns for hypomania.      Pertinent items include:  suicidal ideation, loss of her son to accidental overdose, auditory hallucinations, tom, mutiple psychotropic trials, trauma hx, violent behavior, multiple psych hosps, cocaine use in 2019, metastatic breast cancer, history of med non-adherence with social stressors       Subjective     -taking quetiapine 500mg at bedtime and 100mg after she wakes up early morning   -\"worn out\" today   -sleep is about the same, didn't feel much different with the change in quetiapine   -low appetite lately   -has a lot of worries about her health, her daughter and her grandkids   -had good days around the holiday, described she had \"high energy\", stayed up during [address], took power nap during Christmas. Slept 4 hours over the course of few days. Wasn't tired. Wanted to make it through christmas because she didn't think she would still be alive. Had racing thoughts. Wanted to clean the whole kitchen, talked on phone for hours with people who checked in on her, daughters joined her and helped her to be positive   -above symptoms lasted for couple of days   -crashed afterwards and slept al lot, felt sad thinking about her son who passed away   -no suicidal thoughts   -no substance use     Recent Psych Symptoms:   Depression:  depressed mood, low energy, insomnia, appetite changes, feeling hopeless, overwhelmed, and mood dysregulation  Elevated:  increased energy, decreased sleep need, increased activity, racing thoughts, and talkativeness  for a few days over [place] holiday   Psychosis:   denies auditory/ visual hallucinations      Anxiety:  feeling fearful and nervous/overwhelmed  Trauma Related:   Medical trauma/stressors, anxiety increased when thinking about medical procedures and results  Insomnia:  Yes: intermittent " "difficulty with both initiation and maintenance due to worries / racing thoughts mainly related to her health    Other:  No    Current Social History:  Financial/occupational: SSDI    Living situation (partner, children, pets, etc): Rents an apartment, daughter and grandchildren, 1 cat (\"Toejoe\")  Social/spiritual support: Prayer is important, best friend in [place], sister in [place], daughter   Feels safe at home: Yes    Pertinent Substance Use:   Alcohol: denies recent use   Cannabis: Infrequently - 1-2x per month for refractory pain and anxiety  Tobacco: Yes: 2-3 cigarettes / day   Caffeine:  Yes: drinks coffee every now and then in the morning, drink tea a cup   Opioids: Yes: prescribed   Narcan Kit current: Yes  Other substances: none    Medical Review of Systems:   Lightheadedness/orthostasis: None  Headaches: Very rare tension headaches  GI: none  Sexual health concerns: None    A comprehensive review of systems was performed and is negative other than noted above.        Mental Status Exam     Alertness: alert  and oriented  Appearance: casually groomed  Behavior/Demeanor:  Less agitated , with good  eye contact   Speech: increased rate and increased volume when feeling most distressed  Language: intact and no problems  Psychomotor:  Persistent rocking of her body due to discomfort while laying down in bed  Mood: anxious and worried  Affect:  anxious, smiles appropriately ; congruent to: mood- yes, content- yes  Thought Process/Associations: tangential  Thought Content:  Reports none;  Denies suicidal & violent ideation and delusions and paranoid ideation  Perception:  Reports none;  Denies auditory hallucinations, visual hallucinations, and paranoia  Insight: fair - open to review and discussion of symptoms of depression, tom and psychosis with some difficulty with specific identification of symptoms potentially related to negative symptoms of schizoaffective disorder   Judgment: fair- taking medications " as prescribed   Cognition: does  appear grossly intact; formal cognitive testing was not done  Gait and Station: N/A (telehealth)     Past Psych Med Trials      Medication Max Dose (mg) Dates / Duration Helpful? DC Reason / Adverse Effects?   Seroquel 400mg  yes Max dose of 500mg via 400mg QHS plus 50mg BID PRN   Zoloft 100mg Current  yes    gabapentin 600mg TID  ?    olanzapine 5mg BID 5161-5426  Y     Clozapine   Reported she was taking it while hospitalized in [place] ?      Depakote  Years  no Stopped working    lithium  Years ?until 2019  no Stopped working    Invega    Only brief trial   Ambien          Vitals   There were no vitals taken for this visit.  Pulse Readings from Last 3 Encounters:   12/24/24 72   12/23/24 76   12/05/24 73     Wt Readings from Last 3 Encounters:   12/24/24 107.9 kg (237 lb 12.8 oz)   12/23/24 107.3 kg (236 lb 9.6 oz)   12/05/24 111.1 kg (244 lb 14.4 oz)     BP Readings from Last 3 Encounters:   12/24/24 129/82   12/23/24 122/78   12/05/24 (!) 141/88        Medical History     ALLERGIES: Contrast dye    Patient Active Problem List   Diagnosis    Breast mass    Spine metastasis    Urinary tract infection with hematuria    Malignant neoplasm of overlapping sites of left breast in female, estrogen receptor positive (H)    Carcinoma of left breast metastatic to bone (H)    Metastasis to bone (H)    Pain of right lower leg    Malignant neoplasm of female breast, unspecified estrogen receptor status, unspecified laterality, unspecified site of breast (H)    Schizoaffective disorder, bipolar type (H)    Insomnia, unspecified type    Cancer associated pain    Malignant neoplasm metastatic to bone (H)        Medications     Current Outpatient Medications   Medication Sig Dispense Refill    [START ON 1/20/2025] abemaciclib (VERZENIO) 100 MG tablet Take 1 tablet (100 mg) by mouth 2 times daily for 28 days 56 tablet 0    gabapentin (NEURONTIN) 300 MG capsule Take 2 capsules in the morning, 2  capsules in afternoon, and 3 capsules at bedtime. 210 capsule 1    methocarbamol (ROBAXIN) 500 MG tablet Take 2-3 tablets (1,000-1,500 mg) by mouth 3 times daily as needed for muscle spasms. 210 tablet 2    methylPREDNISolone (MEDROL) 32 MG tablet 12 hours prior to scan and 2 hours prior to scan 2 tablet 3    mirtazapine (REMERON) 15 MG tablet Take 1 tablet (15 mg) by mouth at bedtime. 30 tablet 1    oxyCODONE (ROXICODONE) 5 MG tablet Take 1-2 tablets (5-10 mg) by mouth every 4 hours as needed for pain. 200 tablet 0    QUEtiapine (SEROQUEL) 100 MG tablet Tale 1 tablet (100mg) with 1 tablet (400mg) for total of 500mg by mouth at bedtime 30 tablet 1    QUEtiapine (SEROQUEL) 400 MG tablet Take 1 tablet (400mg) with 1 tablet (100mg) for total of 500mg by mouth at bedtime 30 tablet 1    QUEtiapine (SEROQUEL) 50 MG tablet Take 0.5-1 tablets (25-50 mg) by mouth 2 times daily as needed (for sleep or anxiety attacks). 60 tablet 0    rivaroxaban ANTICOAGULANT (XARELTO) 20 MG TABS tablet Take 1 tablet (20 mg) by mouth daily (with dinner). 90 tablet 3    sertraline (ZOLOFT) 100 MG tablet Take 0.5 tablets (50 mg) by mouth daily. 30 tablet 1    Vitamin D3 (CHOLECALCIFEROL) 25 mcg (1000 units) tablet Take 1 tablet (25 mcg) by mouth daily. 90 tablet 3    abemaciclib (VERZENIO) 100 MG tablet Take 1 tablet (100 mg) by mouth 2 times daily for 28 days (Patient not taking: Reported on 1/6/2025) 56 tablet 0    Rivaroxaban ANTICOAGULANT 15 & 20 MG TBPK Starter Therapy Pack Take 15 mg by mouth 2 times daily (with meals) for 21 days, THEN 20 mg daily with food for 9 days. 51 each 0        Labs and Data         12/8/2023     2:24 PM 6/21/2024     1:58 PM 1/6/2025     2:32 PM   PROMIS-10 Total Score w/o Sub Scores   PROMIS TOTAL - SUBSCORES 15 12 18        Proxy-reported          No data to display                  11/21/2024     2:19 PM 12/24/2024     9:13 AM 1/6/2025     2:27 PM   PHQ-9 SCORE   PHQ-9 Total Score Margarette  11 (Moderate  depression) 7 (Mild depression)   PHQ-9 Total Score 14 11  7        Patient-reported    Proxy-reported         6/21/2024     1:56 PM 10/29/2024    11:05 AM 1/6/2025     2:28 PM   JENNIFFER-7 SCORE   Total Score 11 (moderate anxiety)  18 (severe anxiety)   Total Score 11 16 18        Proxy-reported       Liver/Kidney Function, TSH Metabolic Blood counts   Recent Labs   Lab Test 12/23/24  1505 12/05/24  0822   AST 15 17   ALT 8 10   ALKPHOS 87 92   CR 1.18* 1.07*     Recent Labs   Lab Test 03/16/23  1650   TSH 0.80    Recent Labs   Lab Test 09/11/24  1052   CHOL 177   TRIG 110   LDL 81   HDL 74     Recent Labs   Lab Test 09/11/24  1053   A1C 6.0*     Recent Labs   Lab Test 12/23/24  1505   *    Recent Labs   Lab Test 12/23/24  1505   WBC 3.1*   HGB 11.0*   HCT 32.9*                 ECG 10/19/23 QTc = 423ms    PROVIDER: Odette Peralta MD      Level of Medical Decision Making:   - At least 1 chronic problem that is not stable  - Engaged in prescription drug management during visit (discussed any medication benefits, side effects, alternatives, etc.)     The longitudinal plan of care for the diagnosis(es)/condition(s) as documented were addressed during this visit. Due to the added complexity in care, I will continue to support Cleaster in the subsequent management and with ongoing continuity of care.    Psychiatry Individual Psychotherapy Note   Psychotherapy start time - 1440   Psychotherapy end time - 1530  Date treatment plan last reviewed with patient - 12/04/23  Subjective: This supportive psychotherapy session addressed issues related to goals of therapy and current psychosocial stressors. Patient's reaction: Preparatory in the context of mental status appropriate for ambulatory setting.    Interactive complexity indicated? Yes, visit entailed Interactive Complexity evidenced by:  -The need to manage maladaptive communication (related to, e.g., high anxiety, high reactivity, repeated  questions, or disagreement) among participants that complicates delivery of care  Plan: RTC in timeframe noted above  Psychotherapy services during this visit included myself and the patient.   Treatment Plan      SYMPTOMS; PROBLEMS   MEASURABLE GOALS;    FUNCTIONAL IMPROVEMENT / GAINS INTERVENTIONS DISCHARGE CRITERIA   Depression: depressed mood, low energy, feeling hopelesss, and excessive crying  Anxiety: excessive worry, feeling fearful, and nervous/overwhelmed  Trauma Related: intrusive memories, trauma trigger psychological / physiological response, and mood dysregulation  Soraya/Hypomania: increased energy, decreased sleep need, increased activity, racing thoughts, and talkativeness  Psychosis: auditory hallucinations  Dysregulation: mood dysregulation and irritable   reduce depressive symptoms, find enjoyment at least once a day, learn best practices for sleep, reduce panic attacks/ excessive worry, make a plan to manage 2-3 anxiety-provoking situations, reduce feeling overwhelmed/ improve decision making skills, develop 2 strategies to cope with trauma triggers/intrusive memories, reduce manic/hypomanic episodes, identify coping strategies for AH, and learn 2 new ways of coping with routine stressors Supportive marked symptom improvement, reduced visit frequency, significant improvement in self-reports for anxiety, psychosis, hypomania, and depression consecutive visits, and can transfer back to primary care     Patient staffed in clinic with Dr. Nails who will sign the note.  Supervisor is Dr. Johnson.

## 2025-01-09 ENCOUNTER — TELEPHONE (OUTPATIENT)
Dept: ONCOLOGY | Facility: CLINIC | Age: 50
End: 2025-01-09
Payer: MEDICARE

## 2025-01-09 NOTE — ORAL ONC MGMT
Oral Chemotherapy Monitoring Program     Placed call to patient for follow up of oral chemotherapy (abemaciclib). Call placed to check in on symptoms and ask about refill. [facility] has been unable to contact patient to set up delivery of medication.      Left message to please call back otherwise we will attempt a call in the next few days. No drug names were mentioned.     Terence Díaz, PharmD, [facility]  Oral Chemotherapy Monitoring Program  [facility]  [phone]

## 2025-01-15 ENCOUNTER — MYC REFILL (OUTPATIENT)
Dept: PALLIATIVE CARE | Facility: CLINIC | Age: 50
End: 2025-01-15
Payer: MEDICARE

## 2025-01-15 DIAGNOSIS — C50.812 MALIGNANT NEOPLASM OF OVERLAPPING SITES OF LEFT BREAST IN FEMALE, ESTROGEN RECEPTOR POSITIVE (H): ICD-10-CM

## 2025-01-15 DIAGNOSIS — Z17.0 MALIGNANT NEOPLASM OF OVERLAPPING SITES OF LEFT BREAST IN FEMALE, ESTROGEN RECEPTOR POSITIVE (H): ICD-10-CM

## 2025-01-15 DIAGNOSIS — G89.3 CANCER ASSOCIATED PAIN: ICD-10-CM

## 2025-01-15 RX ORDER — OXYCODONE HYDROCHLORIDE 5 MG/1
5-10 TABLET ORAL EVERY 4 HOURS PRN
Qty: 200 TABLET | Refills: 0 | Status: SHIPPED | OUTPATIENT
Start: 2025-01-15

## 2025-01-15 NOTE — TELEPHONE ENCOUNTER
Received [employer] message from patient requesting refill of oxycodone.     Last refill: 12/23/24  Last office visit: 10/22/24  Scheduled for follow up 1/21/25     Will route request to MD/ for review.     Reviewed [place]  Report.

## 2025-01-17 NOTE — TELEPHONE ENCOUNTER
Called patient to follow up on how she is doing. She said that she missed her appointment yesterday because she was really sick and was throwing up. It started the day before yesterday, about two hours after she had a couple of bites of an omelette at [employer]. She was vomiting for a day and a half, but this has resolved today. She denied diarrhea, and is constipated. She was also having stomach pains during the same time period that she was vomiting. She did not get this checked out as the issue has resolved and she believes it was the food. She has not started on any new medications, and there were no other changes in her life/daily routine. She also mentioned that it could be from stress.     Unfortunately, the call appeared to have been dropped. Writer called back and got VM. Left message asking for a return call. Provided clinic number.         HPI     Glaucoma     Additional comments: DLE            Comments    Pt here today for IOP with rev hvf & oct for POAG - OU.       States blurred vision at both near & distance.  Has cataract eval scheduled with Dr. Vieyra in March.    Denies any eye pain or pressure.    Good compliance with gtts:    Dorzolamide OU bid  Latanoprost OU qhs    Dry AMD - OU, takes AREDS vits          Last edited by Nas Lewis, OD on 1/17/2025 11:01 AM.            Assessment /Plan     For exam results, see Encounter Report.    Primary open angle glaucoma (POAG) of both eyes, mild stage    Combined forms of age-related cataract of both eyes      1. Primary open angle glaucoma (POAG) of both eyes, mild stage (Primary)  TMax 23 / 23   / 618    Reviewed HVF/OCT today --    IOP with current drop regimen  Continue drops:  Dorzolamide bid  OU   Latanoprost qPM OU    Visit today is associated with current or anticipated ongoing medical care related to this patients single serious condition/complex condition (primary open angle glaucoma of both eyes, mild stage)     RTC in 6 months for IOP check    - Posterior Segment OCT Optic Nerve- Both eyes  - Mauro Visual Field - OU - Extended - Both Eyes    2. Combined forms of age-related cataract of both eyes  Moderate OU  Scheduled for cataract eval with Dr. Vieyra next month    3. Dry eye syndrome, bilateral  Discussed ocular affects of dry eyes.   Recommend OTC artificial tears 2-4 times a day in both eyes.   Discussed chronicity of TREASURE.   RTC if symptoms not alleviated by continued use of artificial tears.

## 2025-01-20 ENCOUNTER — INFUSION THERAPY VISIT (OUTPATIENT)
Dept: ONCOLOGY | Facility: CLINIC | Age: 50
End: 2025-01-20
Attending: INTERNAL MEDICINE
Payer: MEDICARE

## 2025-01-20 ENCOUNTER — DOCUMENTATION ONLY (OUTPATIENT)
Dept: ONCOLOGY | Facility: CLINIC | Age: 50
End: 2025-01-20

## 2025-01-20 VITALS
TEMPERATURE: 98.2 F | DIASTOLIC BLOOD PRESSURE: 87 MMHG | OXYGEN SATURATION: 99 % | BODY MASS INDEX: 33.87 KG/M2 | RESPIRATION RATE: 17 BRPM | WEIGHT: 242.7 LBS | SYSTOLIC BLOOD PRESSURE: 145 MMHG | HEART RATE: 86 BPM

## 2025-01-20 DIAGNOSIS — C50.912 CARCINOMA OF LEFT BREAST METASTATIC TO BONE (H): Primary | ICD-10-CM

## 2025-01-20 DIAGNOSIS — C50.812 MALIGNANT NEOPLASM OF OVERLAPPING SITES OF LEFT BREAST IN FEMALE, ESTROGEN RECEPTOR POSITIVE (H): ICD-10-CM

## 2025-01-20 DIAGNOSIS — C79.51 CARCINOMA OF LEFT BREAST METASTATIC TO BONE (H): Primary | ICD-10-CM

## 2025-01-20 DIAGNOSIS — Z17.0 MALIGNANT NEOPLASM OF OVERLAPPING SITES OF LEFT BREAST IN FEMALE, ESTROGEN RECEPTOR POSITIVE (H): ICD-10-CM

## 2025-01-20 DIAGNOSIS — R30.0 DYSURIA: Primary | ICD-10-CM

## 2025-01-20 DIAGNOSIS — Z17.0 MALIGNANT NEOPLASM OF OVERLAPPING SITES OF LEFT BREAST IN FEMALE, ESTROGEN RECEPTOR POSITIVE (H): Primary | ICD-10-CM

## 2025-01-20 DIAGNOSIS — C50.812 MALIGNANT NEOPLASM OF OVERLAPPING SITES OF LEFT BREAST IN FEMALE, ESTROGEN RECEPTOR POSITIVE (H): Primary | ICD-10-CM

## 2025-01-20 LAB
ALBUMIN SERPL BCG-MCNC: 3.9 G/DL (ref 3.5–5.2)
ALBUMIN UR-MCNC: NEGATIVE MG/DL
ALP SERPL-CCNC: 81 U/L (ref 40–150)
ALT SERPL W P-5'-P-CCNC: 10 U/L (ref 0–50)
ANION GAP SERPL CALCULATED.3IONS-SCNC: 10 MMOL/L (ref 7–15)
APPEARANCE UR: CLEAR
AST SERPL W P-5'-P-CCNC: 17 U/L (ref 0–45)
BASOPHILS # BLD AUTO: 0 10E3/UL (ref 0–0.2)
BASOPHILS NFR BLD AUTO: 1 %
BILIRUB SERPL-MCNC: 0.2 MG/DL
BILIRUB UR QL STRIP: NEGATIVE
BUN SERPL-MCNC: 12.8 MG/DL (ref 6–20)
CALCIUM SERPL-MCNC: 9.7 MG/DL (ref 8.8–10.4)
CANCER AG15-3 SERPL-ACNC: 48 U/ML
CEA SERPL-MCNC: 3.7 NG/ML
CHLORIDE SERPL-SCNC: 110 MMOL/L (ref 98–107)
COLOR UR AUTO: YELLOW
CREAT SERPL-MCNC: 0.97 MG/DL (ref 0.51–0.95)
EGFRCR SERPLBLD CKD-EPI 2021: 71 ML/MIN/1.73M2
EOSINOPHIL # BLD AUTO: 0.1 10E3/UL (ref 0–0.7)
EOSINOPHIL NFR BLD AUTO: 3 %
ERYTHROCYTE [DISTWIDTH] IN BLOOD BY AUTOMATED COUNT: 13.6 % (ref 10–15)
GLUCOSE SERPL-MCNC: 126 MG/DL (ref 70–99)
GLUCOSE UR STRIP-MCNC: NEGATIVE MG/DL
HCO3 SERPL-SCNC: 25 MMOL/L (ref 22–29)
HCT VFR BLD AUTO: 30.5 % (ref 35–47)
HGB BLD-MCNC: 10 G/DL (ref 11.7–15.7)
HGB UR QL STRIP: NEGATIVE
HOLD SPECIMEN: NORMAL
IMM GRANULOCYTES # BLD: 0 10E3/UL
IMM GRANULOCYTES NFR BLD: 0 %
KETONES UR STRIP-MCNC: NEGATIVE MG/DL
LEUKOCYTE ESTERASE UR QL STRIP: NEGATIVE
LYMPHOCYTES # BLD AUTO: 1 10E3/UL (ref 0.8–5.3)
LYMPHOCYTES NFR BLD AUTO: 30 %
MCH RBC QN AUTO: 33.1 PG (ref 26.5–33)
MCHC RBC AUTO-ENTMCNC: 32.8 G/DL (ref 31.5–36.5)
MCV RBC AUTO: 101 FL (ref 78–100)
MONOCYTES # BLD AUTO: 0.2 10E3/UL (ref 0–1.3)
MONOCYTES NFR BLD AUTO: 7 %
MUCOUS THREADS #/AREA URNS LPF: PRESENT /LPF
NEUTROPHILS # BLD AUTO: 2 10E3/UL (ref 1.6–8.3)
NEUTROPHILS NFR BLD AUTO: 59 %
NITRATE UR QL: NEGATIVE
NRBC # BLD AUTO: 0 10E3/UL
NRBC BLD AUTO-RTO: 0 /100
PH UR STRIP: 6 [PH] (ref 5–7)
PLATELET # BLD AUTO: 178 10E3/UL (ref 150–450)
POTASSIUM SERPL-SCNC: 3.9 MMOL/L (ref 3.4–5.3)
PROT SERPL-MCNC: 7.1 G/DL (ref 6.4–8.3)
RBC # BLD AUTO: 3.02 10E6/UL (ref 3.8–5.2)
RBC URINE: 1 /HPF
SODIUM SERPL-SCNC: 145 MMOL/L (ref 135–145)
SP GR UR STRIP: 1.03 (ref 1–1.03)
SQUAMOUS EPITHELIAL: 1 /HPF
UROBILINOGEN UR STRIP-MCNC: 6 MG/DL
WBC # BLD AUTO: 3.4 10E3/UL (ref 4–11)
WBC URINE: 3 /HPF

## 2025-01-20 PROCEDURE — 36415 COLL VENOUS BLD VENIPUNCTURE: CPT

## 2025-01-20 PROCEDURE — 250N000011 HC RX IP 250 OP 636: Mod: JZ | Performed by: PHYSICIAN ASSISTANT

## 2025-01-20 PROCEDURE — 86300 IMMUNOASSAY TUMOR CA 15-3: CPT

## 2025-01-20 PROCEDURE — 96402 CHEMO HORMON ANTINEOPL SQ/IM: CPT

## 2025-01-20 PROCEDURE — 81001 URINALYSIS AUTO W/SCOPE: CPT

## 2025-01-20 PROCEDURE — 82378 CARCINOEMBRYONIC ANTIGEN: CPT

## 2025-01-20 PROCEDURE — 85025 COMPLETE CBC W/AUTO DIFF WBC: CPT

## 2025-01-20 PROCEDURE — 80053 COMPREHEN METABOLIC PANEL: CPT

## 2025-01-20 RX ORDER — LAMOTRIGINE 25 MG/1
500 TABLET ORAL ONCE
Status: COMPLETED | OUTPATIENT
Start: 2025-01-20 | End: 2025-01-20

## 2025-01-20 RX ADMIN — FULVESTRANT 500 MG: 50 INJECTION INTRAMUSCULAR at 13:02

## 2025-01-20 ASSESSMENT — PAIN SCALES - GENERAL: PAINLEVEL_OUTOF10: SEVERE PAIN (10)

## 2025-01-20 NOTE — PROGRESS NOTES
Oral Chemotherapy Monitoring Program  Lab Follow Up    Reviewed CMP/CBC lab results from 1/20/25.    Assessment & Plan:  No concerning abnormalities. Did not send MyChart message since patient is being seen in-person for infusion.    1/20 - 1510 - Called patient to verify if they were taking the medication and they confirmed being adherent with the dose and regimen for Verzenio.    Follow-Up:  2/27 - Dr. Plunkett appointment @ [address] [place] [address]  Pharmacy Intern  Oral Chemotherapy Monitoring Program  [facility]  [phone]        11/7/2024     9:00 AM 11/7/2024     4:00 PM 12/6/2024     4:00 PM 12/24/2024     8:00 AM 1/2/2025    10:00 AM 1/2/2025     2:00 PM 1/20/2025     1:00 PM   ORAL CHEMOTHERAPY   Assessment Type Refill Monthly Follow up Refill Lab Monitoring Refill Left Voicemail Lab Monitoring   Diagnosis Code Breast Cancer Breast Cancer Breast Cancer Breast Cancer Breast Cancer Breast Cancer Breast Cancer   Providers Dr. Dimitrios Plunkett   [facility]   Is this patient followed by the [facility] OC team? No No No No No No No   Drug Name Verzenio (abemaciclib) Verzenio (abemaciclib) Verzenio (abemaciclib) Verzenio (abemaciclib) Verzenio (abemaciclib) Verzenio (abemaciclib) Verzenio (abemaciclib)   Dose 100 mg 100 mg 100 mg 100 mg 100 mg 100 mg 100 mg   Current Schedule BID BID BID BID BID BID BID   Cycle Details Continuous Continuous Continuous Continuous Continuous Continuous Continuous   Adverse Effects  Fatigue        Fatigue  Grade 2        Pharmacist Intervention(fatigue)  No        Is the dose as ordered appropriate for the patient?     Yes         Labs:  _  Result Component Current Result Ref Range   Sodium 145 (1/20/2025) 135 - 145 mmol/L     _  Result Component Current Result Ref Range   Potassium 3.9 (1/20/2025) 3.4 - 5.3 mmol/L     _  Result Component  Current Result Ref Range   Calcium 9.7 (1/20/2025) 8.8 - 10.4 mg/dL     _  Result Component Current Result Ref Range   Albumin 3.9 (1/20/2025) 3.5 - 5.2 g/dL     _  Result Component Current Result Ref Range   Urea Nitrogen 12.8 (1/20/2025) 6.0 - 20.0 mg/dL     _  Result Component Current Result Ref Range   Creatinine 0.97 (H) (1/20/2025) 0.51 - 0.95 mg/dL     _  Result Component Current Result Ref Range   AST 17 (1/20/2025) 0 - 45 U/L     _  Result Component Current Result Ref Range   ALT 10 (1/20/2025) 0 - 50 U/L     _  Result Component Current Result Ref Range   Bilirubin Total 0.2 (1/20/2025) <=1.2 mg/dL     _  Result Component Current Result Ref Range   WBC Count 3.4 (L) (1/20/2025) 4.0 - 11.0 10e3/uL     _  Result Component Current Result Ref Range   Hemoglobin 10.0 (L) (1/20/2025) 11.7 - 15.7 g/dL     _  Result Component Current Result Ref Range   Platelet Count 178 (1/20/2025) 150 - 450 10e3/uL     _  Result Component Current Result Ref Range   Absolute Neutrophils 2.0 (1/20/2025) 1.6 - 8.3 10e3/uL

## 2025-01-20 NOTE — NURSING NOTE
Chief Complaint   Patient presents with    Blood Draw     Labs drawn with  by RN. Vitals taken. Pt checked into next appointment.     Notified infusion of patient back pain and request for UTI check.  Mami Perez RN

## 2025-01-20 NOTE — PROGRESS NOTES
Infusion Nursing Note:  Teresa Sanderson presents today for Faslodex.    Patient seen by provider today: No   present during visit today: Not Applicable.    Note: Reported 10/10 low back pain upon arrival. Patient believes she might have a UTI. She denied fevers or chills at home. No pain with urination or smelly or cloudy urine. She has had UTIs previously with back pain. Her pain is a sharp stab that comes and goes. Did provide a urine sample in the lab.    Patient is taking oxycodone and an old prescription of morphine for the pain. Provided heat packs during visit today.    Patient did not want to wait in infusion center for writer to contact provider/for provider response. IB sent to Dr. Plunkett and RNCC with update. Patient does have follow up with palliative care tomorrow. She will return to clinic if her care team requests additional interventions or imaging.    Intravenous Access:  Labs drawn without difficulty.    Treatment Conditions:  Not Applicable.      Post Infusion Assessment:  Patient tolerated faslodex injection to bilateral ventrogluteal without incident.   Injections given simultaneously by 2 RNs.      Discharge Plan:   Discharge instructions reviewed with: Patient.  Patient and/or family verbalized understanding of discharge instructions and all questions answered.  AVS to patient via Anaphore.  Patient will return 2/27 for next appointment.   Patient discharged in stable condition accompanied by: self.  Departure Mode: Ambulatory.      Anca Del Angel RN

## 2025-01-20 NOTE — PROGRESS NOTES
Palliative Care Progress Note    Patient Name: Teresa Sanderson  Primary Provider: No Ref-Primary, Physician    Chief Complaint/Patient ID:   Medical - She has metastatic breast cancer dx 2020; widespread bone mets. S/p RT to lumbar spine and RFA/kyphoplasty L4 2021.   She has been on several different lines of therapy (with some treatment interruptions).  -PET/CT in 2023 showed PD in two small bone metastasis in L pelvis, S/p 5 fractions RT to these lesions.   -Some lapses/breaks in treatment due to complicated social situation.  -Evidence of PD on 2023 PET scan.  Changed to Verzenio 2024 and Fulvestrant. Again s/p RT to low back and L hip 2024.    -Long discussion about voluntary opioid tapering 11/15/22 palliative visit. Due to worsening pain from bony mets, retrial of opioids Rx 2024.     -She has schizoaffective disorder (bipolar type); followed by psychiatry at the .    Last Palliative care appointment: 10/22/2024 with me.      Reviewed: Yes.    Social History: Lives with cousin and son. On SSD. 5 adult children. Housing insecure. Daughter murdered in . Son  Spring 2022. Worked as a  for [employer] before cancer dx, in [place]. No LOYD hx    Interim History:  Teresa is seen today for follow up with Palliative Care via billable video visit.     On day 3 of sharp pains in her lower back. Says it's not constant but comes and goes with certain movements or sitting a certain way.     Back has been bothering her- says she can't sleep. Had been taking her oxycodone at 3 tabs. Found some of her old MSContin and started those a couple days ago. No improvement in pain at all with these increases.    Feels like when she was first diagnosed- it hurts that much. Says she stood for her infusion yesterday as she was worried about her pain flaring up.    Taking epsom salt baths and using heating pad. Taking 1500mg robaxin 3 times daily and 600mg gabapentin 2 times daily, usually  "with oxycodone.     Moved 5 days ago into a house. Not crowded anymore. Swears she didn't do anything that could have hurt her back while moving- kids did all the heavy work.    Wants to be able to get stronger and get back to walking and rehab.    Psychiatry has been adjusting sleep aids. Now using olanzapine more as first line and seroquel as needed.    Feel worried her body is \"trying to shut down\".      Physical Exam:   Constitutional: Alert, pleasant, no apparent distress. Lying back in bed.  Eyes: Sclera non-icteric, no eye discharge.  ENT: No nasal discharge. Ears grossly normal.  Respiratory: Unlabored respirations. Speaking in full sentences.  Musculoskeletal: Extremities appear normal- no gross deformities noted. No edema noted on upper body.   Skin: No suspicious lesions or rashes on visible skin.  Neurologic: Clear speech, no aphasia. No facial droop.  Psychiatric: Mentation appears normal, appropriate attention. Affect normal/bright. Does not appear anxious or depressed.    Key Data Reviewed:  LABS:   Lab Results   Component Value Date    WBC 3.4 (L) 01/20/2025    HGB 10.0 (L) 01/20/2025    HCT 30.5 (L) 01/20/2025     01/20/2025     01/20/2025    POTASSIUM 3.9 01/20/2025    CHLORIDE 110 (H) 01/20/2025    CO2 25 01/20/2025    BUN 12.8 01/20/2025    CR 0.97 (H) 01/20/2025     (H) 01/20/2025    DD 1.0 (H) 12/09/2020    TROPONIN <0.015 08/10/2021    TROPI <0.015 12/08/2020    AST 17 01/20/2025    ALT 10 01/20/2025    ALKPHOS 81 01/20/2025    BILITOTAL 0.2 01/20/2025    INR 1.11 07/01/2024       Impression & Recommendations & Counseling:  Teresa Sanderson has a history of metastatic breast cancer.     Reviewed the importance of not increasing or adjusting medications without talking to our team first. Discussed safety and insurance issues that can happen with this.     I'm not sure what is causing her increased pain. She has had a variety of different pain over the years. Note that the " temperature has been ridiculously with the Polor vortex (Sub zero). The fact that she reports this pain is similar to what brought her in to ED is concerning to me. Next imaging is scheduled for 2/27.    Recommendations:  -Increase gabapentin to 600mg three times daily for three days, then can increase to 900mg three times daily.  -Continue oxycodone 5-10mg Q4H PRN.  Discussed importance of not increasing or adjusting medications without talking to our team first.   -Previously encouraged to use heat and trial topical agents such as Voltaren.  -Encouraged to continue with physical therapy appointments and exercises.  -Continue Robaxin 1500mg three times daily.  -If pain is still not improved after temperature increases and we get to a more therapeutic dose of the gabapentin, recommend letting Oncology know about the increased pain, since it feels like when she was first diagnosed.      Follow up: 2-3 months      Video-Visit Details  Video Start Time: 9:20AM  Video End Time: 10:03AM    Originating Location (pt. Location): Home     Distant Location (provider location):  Offsite- Personal Home      Platform used for Video Visit: Chad     Total time spent on day of encounter is 43 mins, including reviewing record, review of above studies, above visit with patient, symptomatic discussion as above, including medication adjustments/prescription management, and documentation.       The longitudinal plan of care for the diagnosis(es)/condition(s) as documented were addressed during this visit.?Due to the added complexity in care, I will continue to support Teresa Sanderson in the subsequent management and with the ongoing continuity of care.      Ayanna Tellez DO  Palliative Medicine   [id number]    Some chart documentation performed using Dragon Voice recognition Software. Although reviewed after completion, some words and grammatical errors may remain.

## 2025-01-20 NOTE — PATIENT INSTRUCTIONS
[facility] Triage and after hours / weekends / holidays:  [phone] option 5, option 2    Please call the triage or after hours line if you experience a temperature greater than or equal to 100.4, shaking chills, have uncontrolled nausea, vomiting and/or diarrhea, dizziness, shortness of breath, chest pain, bleeding, unexplained bruising, or if you have any other new/concerning symptoms, questions or concerns.      If you are having any concerning symptoms or wish to speak to a provider before your next infusion visit, please call triage to notify your care team so we can adequately serve you.     If you need a refill on a narcotic prescription or other medication, please call before your infusion appointment.

## 2025-01-21 ENCOUNTER — VIRTUAL VISIT (OUTPATIENT)
Dept: PALLIATIVE CARE | Facility: CLINIC | Age: 50
End: 2025-01-21
Attending: STUDENT IN AN ORGANIZED HEALTH CARE EDUCATION/TRAINING PROGRAM
Payer: MEDICARE

## 2025-01-21 VITALS — BODY MASS INDEX: 33.88 KG/M2 | WEIGHT: 242 LBS | HEIGHT: 71 IN

## 2025-01-21 DIAGNOSIS — Z51.5 ENCOUNTER FOR PALLIATIVE CARE: ICD-10-CM

## 2025-01-21 DIAGNOSIS — M54.9 BACK PAIN WITH HISTORY OF SPINAL SURGERY: ICD-10-CM

## 2025-01-21 DIAGNOSIS — Z98.890 BACK PAIN WITH HISTORY OF SPINAL SURGERY: ICD-10-CM

## 2025-01-21 DIAGNOSIS — Z79.891 ENCOUNTER FOR LONG-TERM USE OF OPIATE ANALGESIC: ICD-10-CM

## 2025-01-21 DIAGNOSIS — Z17.0 MALIGNANT NEOPLASM OF OVERLAPPING SITES OF LEFT BREAST IN FEMALE, ESTROGEN RECEPTOR POSITIVE (H): Primary | ICD-10-CM

## 2025-01-21 DIAGNOSIS — F25.0 SCHIZOAFFECTIVE DISORDER, BIPOLAR TYPE (H): ICD-10-CM

## 2025-01-21 DIAGNOSIS — M79.661 PAIN OF RIGHT LOWER LEG: ICD-10-CM

## 2025-01-21 DIAGNOSIS — C50.812 MALIGNANT NEOPLASM OF OVERLAPPING SITES OF LEFT BREAST IN FEMALE, ESTROGEN RECEPTOR POSITIVE (H): Primary | ICD-10-CM

## 2025-01-21 DIAGNOSIS — C79.51 METASTASIS TO BONE (H): ICD-10-CM

## 2025-01-21 RX ORDER — GABAPENTIN 300 MG/1
900 CAPSULE ORAL 3 TIMES DAILY
Qty: 270 CAPSULE | Refills: 1 | Status: SHIPPED | OUTPATIENT
Start: 2025-01-21

## 2025-01-21 ASSESSMENT — PAIN SCALES - GENERAL: PAINLEVEL_OUTOF10: SEVERE PAIN (10)

## 2025-01-21 NOTE — PATIENT INSTRUCTIONS
Recommendations:  -Increase gabapentin to 600mg (2 capsules) three times daily for three days, then can increase to 900mg (3 capsules) three times daily.  -Continue oxycodone 5-10mg every 4 hours as needed..  Discussed importance of not increasing or adjusting medications without talking to our team first.   -Previously encouraged to use heat and trial topical agents such as Voltaren.  -Encouraged to continue with physical therapy appointments and exercises.  -Continue Robaxin 1500mg three times daily.  -If pain is still not improved after temperature increases and we get to a more therapeutic dose of the gabapentin, recommend letting Oncology know about the increased pain, since it feels like when she was first diagnosed.    Follow up: 2-3 months      Reasons to Call    If you are having worsening/uncontrolled symptoms we want you to call!    You or your other physicians make any changes to medications we have prescribed.  -Please call for refills 4-5 days before you will run out of medication.    Important Phone Numbers, including: Refills, scheduling, and general questions     Palliative Care RN: Aisha Shelton : [phone]  *For scheduling needs/follow up visits : [phone]  *After hours or on weekends- Will connect you with on call MD : [phone].

## 2025-01-21 NOTE — LETTER
2025       RE: Teresa Sanderson  [address]  [facility] [address]     Dear Colleague,    [facility] you [facility] your [facility], [facility] Sanderson[facility]. Please see a copy of my visit note below.    Palliative Care Progress Note    Patient Name: Teresa Sanderson  Primary Provider: No Ref-Primary, Physician    Chief Complaint/Patient ID:   Medical - She has metastatic breast cancer dx 2020; widespread bone mets. S/p RT to lumbar spine and RFA/kyphoplasty L4 2021.   She has been on several different lines of therapy (with some treatment interruptions).  -PET/CT in 2023 showed PD in two small bone metastasis in L pelvis, S/p 5 fractions RT to these lesions.   -Some lapses/breaks in treatment due to complicated social situation.  -Evidence of PD on 2023 PET scan.  Changed to Verzenio 2024 and Fulvestrant. Again s/p RT to low back and L hip 2024.    -Long discussion about voluntary opioid tapering 11/15/22 palliative visit. Due to worsening pain from bony mets, retrial of opioids Rx 2024.     -She has schizoaffective disorder (bipolar type); followed by psychiatry at the .    Last Palliative care appointment: 10/22/2024 with me.      Reviewed: Yes.    Social History: Lives with cousin and son. On SSD. 5 adult children. Housing insecure. Daughter murdered in . Son  Spring 2022. Worked as a  for [employer] before cancer dx, in [place]. No LOYD hx    Interim History:  Teresa is seen today for follow up with Palliative Care via billable video visit.     On day 3 of sharp pains in her lower back. Says it's not constant but comes and goes with certain movements or sitting a certain way.     Back has been bothering her- says she can't sleep. Had been taking her oxycodone at 3 tabs. Found some of her old MSContin and started those a couple days ago. No  "improvement in pain at all with these increases.    Feels like when she was first diagnosed- it hurts that much. Says she stood for her infusion yesterday as she was worried about her pain flaring up.    Taking epsom salt baths and using heating pad. Taking 1500mg robaxin 3 times daily and 600mg gabapentin 2 times daily, usually with oxycodone.     Moved 5 days ago into a house. Not crowded anymore. Swears she didn't do anything that could have hurt her back while moving- kids did all the heavy work.    Wants to be able to get stronger and get back to walking and rehab.    Psychiatry has been adjusting sleep aids. Now using olanzapine more as first line and seroquel as needed.    Feel worried her body is \"trying to shut down\".      Physical Exam:   Constitutional: Alert, pleasant, no apparent distress. Lying back in bed.  Eyes: Sclera non-icteric, no eye discharge.  ENT: No nasal discharge. Ears grossly normal.  Respiratory: Unlabored respirations. Speaking in full sentences.  Musculoskeletal: Extremities appear normal- no gross deformities noted. No edema noted on upper body.   Skin: No suspicious lesions or rashes on visible skin.  Neurologic: Clear speech, no aphasia. No facial droop.  Psychiatric: Mentation appears normal, appropriate attention. Affect normal/bright. Does not appear anxious or depressed.    Key Data Reviewed:  LABS:   Lab Results   Component Value Date    WBC 3.4 (L) 01/20/2025    HGB 10.0 (L) 01/20/2025    HCT 30.5 (L) 01/20/2025     01/20/2025     01/20/2025    POTASSIUM 3.9 01/20/2025    CHLORIDE 110 (H) 01/20/2025    CO2 25 01/20/2025    BUN 12.8 01/20/2025    CR 0.97 (H) 01/20/2025     (H) 01/20/2025    DD 1.0 (H) 12/09/2020    TROPONIN <0.015 08/10/2021    TROPI <0.015 12/08/2020    AST 17 01/20/2025    ALT 10 01/20/2025    ALKPHOS 81 01/20/2025    BILITOTAL 0.2 01/20/2025    INR 1.11 07/01/2024       Impression & Recommendations & Counseling:  Teresa Sanderson has a " history of metastatic breast cancer.     Reviewed the importance of not increasing or adjusting medications without talking to our team first. Discussed safety and insurance issues that can happen with this.     I'm not sure what is causing her increased pain. She has had a variety of different pain over the years. Note that the temperature has been ridiculously with the Polor vortex (Sub zero). The fact that she reports this pain is similar to what brought her in to ED is concerning to me. Next imaging is scheduled for 2/27.    Recommendations:  -Increase gabapentin to 600mg three times daily for three days, then can increase to 900mg three times daily.  -Continue oxycodone 5-10mg Q4H PRN.  Discussed importance of not increasing or adjusting medications without talking to our team first.   -Previously encouraged to use heat and trial topical agents such as Voltaren.  -Encouraged to continue with physical therapy appointments and exercises.  -Continue Robaxin 1500mg three times daily.  -If pain is still not improved after temperature increases and we get to a more therapeutic dose of the gabapentin, recommend letting Oncology know about the increased pain, since it feels like when she was first diagnosed.      Follow up: 2-3 months      Video-Visit Details  Video Start Time: 9:20AM  Video End Time: 10:03AM    Originating Location (pt. Location): Home     Distant Location (provider location):  Offsite- Personal Home      Platform used for Video Visit: Chad     Total time spent on day of encounter is 43 mins, including reviewing record, review of above studies, above visit with patient, symptomatic discussion as above, including medication adjustments/prescription management, and documentation.       The longitudinal plan of care for the diagnosis(es)/condition(s) as documented were addressed during this visit.?Due to the added complexity in care, I will continue to support Teresa Sanderson in the subsequent  management and with the ongoing continuity of care.      Ayanna Tellez DO  Palliative Medicine   [id number]    Some chart documentation performed using Dragon Voice recognition Software. Although reviewed after completion, some words and grammatical errors may remain.      Again, thank you for allowing me to participate in the care of your patient.      Sincerely,    Ayanna Tellez DO

## 2025-01-21 NOTE — NURSING NOTE
Current patient location: [address]   [facility] [address]    Is the patient currently in the state of [place]? YES    Visit mode: VIDEO    If the visit is dropped, the patient can be reconnected by:VIDEO VISIT: Text to cell phone:   Telephone Information:   Mobile [phone]       Will anyone else be joining the visit? NO  (If patient encounters technical issues they should call [phone]    Are changes needed to the allergy or medication list? No    Are refills needed on medications prescribed by this physician? NO    Rooming Documentation:  Questionnaire(s) not done per department protocol    Reason for visit: LUISA CUELLAR

## 2025-01-30 ENCOUNTER — PATIENT OUTREACH (OUTPATIENT)
Dept: ONCOLOGY | Facility: CLINIC | Age: 50
End: 2025-01-30
Payer: MEDICARE

## 2025-01-30 NOTE — PROGRESS NOTES
[facility]: Cancer Care                                                                                          Reached out x 2 to touch base with Teresa to see how she is doing and if she has any questions/concerns.   LM asking her to return my call.     Oly Munguia RN

## 2025-02-03 ENCOUNTER — HOSPITAL ENCOUNTER (OUTPATIENT)
Dept: PET IMAGING | Facility: CLINIC | Age: 50
Discharge: HOME | End: 2025-02-03
Attending: INTERNAL MEDICINE | Admitting: INTERNAL MEDICINE
Payer: MEDICARE

## 2025-02-03 DIAGNOSIS — C50.812 MALIGNANT NEOPLASM OF OVERLAPPING SITES OF LEFT BREAST IN FEMALE, ESTROGEN RECEPTOR POSITIVE (H): ICD-10-CM

## 2025-02-03 DIAGNOSIS — Z17.0 MALIGNANT NEOPLASM OF OVERLAPPING SITES OF LEFT BREAST IN FEMALE, ESTROGEN RECEPTOR POSITIVE (H): ICD-10-CM

## 2025-02-03 DIAGNOSIS — C79.51 CARCINOMA OF LEFT BREAST METASTATIC TO BONE (H): ICD-10-CM

## 2025-02-03 DIAGNOSIS — C50.912 CARCINOMA OF LEFT BREAST METASTATIC TO BONE (H): ICD-10-CM

## 2025-02-03 PROCEDURE — 78816 PET IMAGE W/CT FULL BODY: CPT | Mod: 26 | Performed by: RADIOLOGY

## 2025-02-03 PROCEDURE — 78816 PET IMAGE W/CT FULL BODY: CPT | Mod: PS

## 2025-02-03 PROCEDURE — 343N000001 HC RX 343 MED OP 636: Performed by: INTERNAL MEDICINE

## 2025-02-03 PROCEDURE — A9552 F18 FDG: HCPCS | Performed by: INTERNAL MEDICINE

## 2025-02-03 RX ORDER — FLUDEOXYGLUCOSE F 18 200 MCI/ML
10-18 INJECTION, SOLUTION INTRAVENOUS ONCE
Status: COMPLETED | OUTPATIENT
Start: 2025-02-03 | End: 2025-02-03

## 2025-02-03 RX ADMIN — FLUDEOXYGLUCOSE F 18 14.49 MILLICURIE: 200 INJECTION, SOLUTION INTRAVENOUS at 10:28

## 2025-02-05 ENCOUNTER — VIRTUAL VISIT (OUTPATIENT)
Dept: ONCOLOGY | Facility: CLINIC | Age: 50
End: 2025-02-05
Attending: INTERNAL MEDICINE
Payer: MEDICARE

## 2025-02-05 DIAGNOSIS — Z17.0 MALIGNANT NEOPLASM OF OVERLAPPING SITES OF LEFT BREAST IN FEMALE, ESTROGEN RECEPTOR POSITIVE (H): ICD-10-CM

## 2025-02-05 DIAGNOSIS — C79.51 CARCINOMA OF LEFT BREAST METASTATIC TO BONE (H): ICD-10-CM

## 2025-02-05 DIAGNOSIS — C50.912 CARCINOMA OF LEFT BREAST METASTATIC TO BONE (H): ICD-10-CM

## 2025-02-05 DIAGNOSIS — C50.812 MALIGNANT NEOPLASM OF OVERLAPPING SITES OF LEFT BREAST IN FEMALE, ESTROGEN RECEPTOR POSITIVE (H): Primary | ICD-10-CM

## 2025-02-05 DIAGNOSIS — Z98.890 BACK PAIN WITH HISTORY OF SPINAL SURGERY: ICD-10-CM

## 2025-02-05 DIAGNOSIS — C50.812 MALIGNANT NEOPLASM OF OVERLAPPING SITES OF LEFT BREAST IN FEMALE, ESTROGEN RECEPTOR POSITIVE (H): ICD-10-CM

## 2025-02-05 DIAGNOSIS — M62.838 MUSCLE SPASM: ICD-10-CM

## 2025-02-05 DIAGNOSIS — Z17.0 MALIGNANT NEOPLASM OF OVERLAPPING SITES OF LEFT BREAST IN FEMALE, ESTROGEN RECEPTOR POSITIVE (H): Primary | ICD-10-CM

## 2025-02-05 DIAGNOSIS — G89.3 CANCER ASSOCIATED PAIN: ICD-10-CM

## 2025-02-05 DIAGNOSIS — M54.9 BACK PAIN WITH HISTORY OF SPINAL SURGERY: ICD-10-CM

## 2025-02-05 RX ORDER — LAMOTRIGINE 25 MG/1
500 TABLET ORAL ONCE
OUTPATIENT
Start: 2025-02-05 | End: 2025-02-05

## 2025-02-05 RX ORDER — METHOCARBAMOL 500 MG/1
TABLET, FILM COATED ORAL 3 TIMES DAILY PRN
Qty: 210 TABLET | Refills: 2 | Status: SHIPPED | OUTPATIENT
Start: 2025-02-05

## 2025-02-05 NOTE — NURSING NOTE
Current patient location: [address]   [facility] [address]    Is the patient currently in the state of [place]? YES    Visit mode: VIDEO    If the visit is dropped, the patient can be reconnected by:VIDEO VISIT: Text to cell phone:   Telephone Information:   Mobile [phone]       Will anyone else be joining the visit? YES: How would they like to receive their invitation? Text to cell phone: [phone]  (If patient encounters technical issues they should call [phone])    Are changes needed to the allergy or medication list? Pt stated no changes to allergies and Pt stated no med changes    Are refills needed on medications prescribed by this physician? NO    Rooming Documentation:  Questionnaire(s) completed    Reason for visit: RECHECK    Jorge CUELLAR

## 2025-02-05 NOTE — LETTER
2/5/2025      Teresa Sanderson  [address]  [facility] [address]      Dear Colleague,    Thank you for referring your patient, Teresa Sanderson, to the [facility]. Please see a copy of my visit note below.    Virtual Visit Details    Type of service:  Video Visit     Originating Location (pt. Location): Home    Distant Location (provider location):  Off-site  Platform used for Video Visit: [facility]              Oncology Visit:   Date on this visit: Feb 5, 2025    Diagnosis:  ER positive left breast cancer metastasized to bones.     Primary Physician: No Ref-Primary, Physician     History Of Present Illness:    Ms. Sanderson is a 49 year old female with a h/o tobacco abuse and DVTs with left breast cancer metastasized to bone. She presented to [place] ED with back pain on 12/5/2020. MRI of the L-spine showed an abnormal L4 lesion with associated right paraspinal mass, abnormalities in L5 and the left iliac bone were also seen. CT C/A/P showed a left breast mass, lytic lesions of T7, L4, and the pelvis, and a 3 cm lesion in the kidney (thought to be a cyst). Ultrasound of the bilateral lower extremities showed a non-occlusive thrombus in the left popliteal vein. Mammogram and ultrasound of the bilateral breasts on 12/17/2020 showed a spiculated mass measuring at least 7.8 cm at 12-1:00 left breast extending from the nipple to 9 cm from the nipple with associated nipple retraction. This mass was biopsied, and showed IDC with surrounding DCIS, grade 3, ER+ 90%, and NC+ 75%.  HER2 was equivocal in approximately 35% of tumor cells by FISH and was negative by IHC.    Metastatic Breast Cancer Treatment:  12/23/2021 - 1/7/2021  Radiation (3000 cGy) to the lumbar spine.  1/29/2021 - present  Ibrance, zoladex, and anastrozole.  5/17/2021 radiofrequency ablation, kyphoplasty to L4  8/20/2021  Bilateral salpingo-oophorectomies, Ibrance and anastrozole.  She was off anastrozole  12/2021 - 2/2022 and off Ibrance 01/2022 - 02/2022 due to stress and impending homelessness. Off both again 03/2022-04/2022 due to death of her son but restarted in 05/2022.  04/2023  PET/CT with progression in 2 small metastases in the left pelvis.  Palbociclib continued.  Anastrozole changed to exemestane  5/5/2023  Completed radiation, 2000 cGy in 5 fractions, to the left ilium.  12/19/2023 Left breast biopsy  [facility] NGS:   ER positive, 3+  100%   AL positive, 1+, 5%   HER2 negative.   AR positive, 1+, 35%   MMR proficient   PD-L1 negative, CPS 0   PTEN positive, 1+ 100%  *Of note, all of the above was IHC, DNA quantity not sufficient for NGS  1/18/24 - present  Fulvestrant + abemaciclib.  Abemaciclib dose reduced to 100 mg PO BID due to hand foot syndrome.  7/18/2024  completed radiation to the left acetabulum and the SI    Interval History: Teresa has been feeling poorly. She has been having a lot of low back, left sided SI joint pain for the past 2.5 weeks. She has been utilizing all her pain medications, even taking up to 3 oxycodone at a time without much relief. She otherwise has some leg stiffness but no focal R sided leg pain or rib pain.     She had 3 diarrhea episodes otherwise this has been okay. No nausea.     Appetite is down a bit but she has not lost weight. She is hydrating well.     No fevers/chills. No URI symptoms. Breathing okay. No bladder concerns.     She admits she does miss the morning dose of verzenio a few times per week due to sleeping in.       Past Medical/Surgical History:   Past Medical History:   Diagnosis Date     Anxiety      Breast CA (H) 12/2020     Depression      DVT (deep venous thrombosis) (H) 2014     Left breast mass     x approximately 4-5 months     Pulmonary emboli (H)      Pyelonephritis      Schizoaffective disorder (H)      Tobacco use      Past Surgical History:   Procedure Laterality Date     COLONOSCOPY N/A 7/8/2022    Procedure: COLONOSCOPY, WITH POLYPECTOMY;   Surgeon: Ham Cano MD;  Location: [facility] OR     IR LUMBAR KYPHOPLASTY VERTEBRAE  5/17/2021     LAPAROSCOPIC SALPINGO-OOPHORECTOMY Bilateral 8/20/2021    Procedure: BILATERAL SALPINGO-OOPHORECTOMY, LAPAROSCOPIC;  Surgeon: Rory Lopez MD;  Location: [facility] OR     TUBAL LIGATION  1998        Allergies   Allergen Reactions     Contrast Dye      Pt developed nausea after isovue 370 injection on 6/9/21        Current Outpatient Medications   Medication Sig Dispense Refill     abemaciclib (VERZENIO) 100 MG tablet Take 1 tablet (100 mg) by mouth 2 times daily for 28 days 56 tablet 0     gabapentin (NEURONTIN) 300 MG capsule Take 3 capsules (900 mg) by mouth 3 times daily. 270 capsule 1     methocarbamol (ROBAXIN) 500 MG tablet Take 2-3 tablets (1,000-1,500 mg) by mouth 3 times daily as needed for muscle spasms. 210 tablet 2     methylPREDNISolone (MEDROL) 32 MG tablet 12 hours prior to scan and 2 hours prior to scan 2 tablet 3     OLANZapine (ZYPREXA) 5 MG tablet Take 1-2 tablets (5-10 mg) by mouth nightly as needed for sleep. Take 1/2 tablet (2.5mg) daily as needed for hypomania/ tom. 30 tablet 1     oxyCODONE (ROXICODONE) 5 MG tablet Take 1-2 tablets (5-10 mg) by mouth every 4 hours as needed for pain. 200 tablet 0     QUEtiapine (SEROQUEL) 100 MG tablet Tale 1 tablet (100mg) with 1 tablet (400mg) for total of 500mg by mouth at bedtime 30 tablet 2     QUEtiapine (SEROQUEL) 400 MG tablet Take 1 tablet (400mg) with 1 tablet (100mg) for total of 500mg by mouth at bedtime 30 tablet 2     QUEtiapine (SEROQUEL) 50 MG tablet Take 0.5-1 tablets (25-50 mg) by mouth 2 times daily as needed (for sleep, mood and anxiety). 60 tablet 2     rivaroxaban ANTICOAGULANT (XARELTO) 20 MG TABS tablet Take 1 tablet (20 mg) by mouth daily (with dinner). 90 tablet 3     Rivaroxaban ANTICOAGULANT 15 & 20 MG TBPK Starter Therapy Pack Take 15 mg by mouth 2 times daily (with meals) for 21 days, THEN 20 mg daily with food for 9 days.  51 each 0     sertraline (ZOLOFT) 100 MG tablet Take 0.5 tablets (50 mg) by mouth daily. 30 tablet 1     Vitamin D3 (CHOLECALCIFEROL) 25 mcg (1000 units) tablet Take 1 tablet (25 mcg) by mouth daily. 90 tablet 3     No current facility-administered medications for this visit.   '    Video physical exam  General: Patient appears well in no acute distress.   Skin: No visualized rash or lesions on visualized skin  Eyes: EOMI, no erythema, sclera icterus or discharge noted  Resp: Appears to be breathing comfortably without accessory muscle usage, speaking in full sentences, no cough  MSK: Appears to have normal range of motion based on visualized movements  Neurologic: No apparent tremors, facial movements symmetric  Psych: affect and mood congruent, alert and oriented      Laboratory/Imaging Studies:   I personally reviewed the below laboratories and images while in clinic today:     01/20/25 12:22   Sodium 145   Potassium 3.9   Chloride 110 (H)   Carbon Dioxide (CO2) 25   Urea Nitrogen 12.8   Creatinine 0.97 (H)   GFR Estimate 71   Calcium 9.7   Anion Gap 10   Albumin 3.9   Protein Total 7.1   Alkaline Phosphatase 81   ALT 10   AST 17   Bilirubin Total 0.2   Cancer Antigen 15-3 48 (H)   CEA 3.7   Glucose 126 (H)   WBC 3.4 (L)   Hemoglobin 10.0 (L)   Hematocrit 30.5 (L)   Platelet Count 178   RBC Count 3.02 (L)    (H)   MCH 33.1 (H)   MCHC 32.8   RDW 13.6   % Neutrophils 59   % Lymphocytes 30   % Monocytes 7   % Eosinophils 3   % Basophils 1   Absolute Basophils 0.0   Absolute Eosinophils 0.1   Absolute Immature Granulocytes 0.0   Absolute Lymphocytes 1.0   Absolute Monocytes 0.2   % Immature Granulocytes 0   Absolute Neutrophils 2.0   Absolute NRBCs 0.0   NRBCs per 100 WBC 0      10/30/24 11:46 11/25/24 13:42 12/05/24 08:22 12/23/24 15:05 01/20/25 12:22   Cancer Antigen 15-3 51 (H) 52 (H)  50 (H) 48 (H)   CEA 3.7 4.3 4.2 4.2 3.7       FINDINGS:      HEAD/NECK:  There is no suspicious FDG uptake in the head or  neck.     Stable non-FDG avid 1.7 cm right thyroid nodule.     CHEST:  Stable size of 1.6 cm mass within the left breast, with minimally  increased hypermetabolic activity, now SUV max 4.54 (previously 3.5).      Mild degree of non-FDG avid groundglass opacities within the left  lower lobe, likely atelectasis.     ABDOMEN AND PELVIS:  Redemonstration of diffuse FDG uptake throughout the stomach without  corresponding CT around it, however limited due to lack of intravenous  contrast and decompressed appearance.     Stable size and appearance of non-FDG avid partially exophytic cystic  lesion within the midpole of the right kidney. Unchanged focal  hyperdensities within the inferior vena cava, compatible with known  extravasation of L4 vertebroplasty cement components     LOWER EXTREMITIES:   No abnormal masses or hypermetabolic lesions.     BONES:   Multilevel degenerative changes of the spine, including stable mild  FDG uptake along the left C3-C4 articular facet, compatible with  inflammatory changes. Additional foci of degenerative uptake within  the bilateral coracoclavicular joints.      Newly hypermetabolic (max suv 5.8)  left iliac lesion at the anterior  aspect of the sacral iliac joint (series 4 image 242)   Stable size with decrease in FDG avidity of left acetabular focus, now  SUV max 3.67, previously 7.3. Stable size of known T7 vertebral body  lesion with increased corresponding FDG uptake, now 4.99 SUV max,  previously 3.24. Also increase in foci of hypermetabolism in T8 and  T9. Increased metabolic activity involving the right posterior sixth  rib, SUV max 3.74, previously 2.1.  New foci of hypermetabolism in the  right femoral head.     Redemonstration of inflammatory stranding involving the subcutaneous  soft tissues of the bilateral buttock with mild corresponding FDG  uptake, right greater than left, compatible with medication  administration/sequelae of trauma (series 4 image 248), SUV max  3.63.                                                                      IMPRESSION:   1. Mildly increased metabolic activity of 1.6 cm left breast mass when  compared to PET CT 9/24/2024, without significant change in size.  2. Increased metabolic activity within the left iliac, T7 vertebral  body, and right sixth rib concerning for osseous disease progression.  New foci in the right femoral head. Additional decreased metabolic  activity within the left acetabular focus.   3. Redemonstration of diffuse homogeneous uptake of the stomach,  nonspecific, but can be seen with gastritis in the appropriate  clinical setting.     I have personally reviewed the examination and initial interpretation  and I agree with the findings.     MALENA FARAH MD       ASSESSMENT/PLAN:   49 year old female with history of DVT and ER positive left breast cancer metastasized to bones.    1.  Metastatic breast cancer: She has been on her most recent treatment with abemaciclib and fulvestrant since 01/2024.    - Reviewed PET/CT from a few days ago showing some mixed response but concerns for progression in bones and breast with more avid breast lesion, T7 lesion, a few rib lesions, and new sites of disease in R femur and L sacroiliac area.    -There has been concern of some non compliance to treatment and Teresa confirms this today. Will have her step up compliance and stay on same treatment for now. In the meantime will order bone biopsy for tissue NGS. This was attempted in the past and we had insufficient sample to run NGS. This will guide next line of treatment. Discussed with Dr. Plunkett.   -Abemaciclib dose reduced to 100 mg PO BID secondary to hand foot syndrome.  Symptoms improved with the dose reduction.  - Continue monthly fulvestrant.   - Recommend tissue biopsy vs Guardant 360 liquid NGS at time of progression to help guide next treatment (capivasertib vs everolimus vs elacestrant)    2.  Bone metastases/low back  pain with LLE sciatica:  She is s/p XRT to the left ilium as well as the lumbar spine and kyphoplasty of L4--with some extravasation of cement which procedularist is aware of and recommended continued AC indefinitely.   - most recently received radiation to the left acetabulum and the SI, completed on 7/18/2024.  - She is currently taking oxycodone, Robaxin, and gabapentin.  Ongoing follow up with palliative medicine. Will help facilitate closer follow-up with them given difficulty with pain control.   -She has joint pain and stiffness consistent AI induced arthralgias as well. Cymbalta can be used for this however this may be difficult to try with her other psychiatric medications. Encouraged stretching, routine physical activity, and continuing vitamin D as these help as well.   - Receiving Zometa once every 3 months. Next due early March 2025.     3.  Schizoaffective disorder, bipolar type: No change with last visit.  She is on treatment with Seroquel and Zoloft. Ongoing follow up with psychiatry.     4.  FDG uptake stomach: Seen on last few PETs. Diagnostic EGD referral was placed at last visit--patient did not schedule. She is having very minimal reflux symptoms at this time.     6.  FELTON:  Secondary to abemaciclib. Cr is stable. Continue to avoid NSAIDs and encouraged good hydration.       45 minutes spent on the date of the encounter doing chart review, review of test results, interpretation of tests, patient visit, and documentation       The longitudinal plan of care for the diagnosis(es)/condition(s) as documented were addressed during this visit. Due to the added complexity in care, I will continue to support Teresa in the subsequent management and with ongoing continuity of care.      Alee De Los Santos PA-C                       Again, thank you for allowing me to participate in the care of your patient.        Sincerely,        Alee De Los Santos PA-C    Electronically signed

## 2025-02-05 NOTE — CONSULTS
Outpatient IR Referral   02/05/25    Referring Provider: Alee ANDERS   IR Referral Request: bone biopsy : ideally L sacroiliac lesion, otherwise any PET avid spot safe for biopsy : metastatic breast cancer to bone. Need tissue sampling for next generation sequencing.     Recommendations/Plan:  CT guided left iliac lesion biopsy, CT 2/3/25 S 4 Image 242  Surg path entered under referrer.  Referring team to enter NGS order  Case and imaging was reviewed with Dr. Dupont and Dr. Wiggins   IR recommendations also relayed Epic messaging.    IR referral converted to procedure order.      Xarelto hold x 2 doses CrCl 95.7 ml min     Brief History:    Teresa Sanderson is a 49 year old female with history of tobacco use, DVTs, schizoaffective disorder, bipolar type, IV left breast cancer to bone status post radiation to the L1-S1 and left acetabulum (7/18/2024), BSO, . Additional history of kyphoplasty of L4 May 2021. Most recent treatment with abemaciclib and fulvestrant since 01/2024.    Pertinent Medications:    Current Outpatient Medications   Medication Sig Dispense Refill    abemaciclib (VERZENIO) 100 MG tablet Take 1 tablet (100 mg) by mouth 2 times daily for 28 days 56 tablet 0    gabapentin (NEURONTIN) 300 MG capsule Take 3 capsules (900 mg) by mouth 3 times daily. 270 capsule 1    methocarbamol (ROBAXIN) 500 MG tablet Take 2-3 tablets (1,000-1,500 mg) by mouth 3 times daily as needed for muscle spasms. 210 tablet 2    methylPREDNISolone (MEDROL) 32 MG tablet 12 hours prior to scan and 2 hours prior to scan 2 tablet 3    OLANZapine (ZYPREXA) 5 MG tablet Take 1-2 tablets (5-10 mg) by mouth nightly as needed for sleep. Take 1/2 tablet (2.5mg) daily as needed for hypomania/ tom. 30 tablet 1    oxyCODONE (ROXICODONE) 5 MG tablet Take 1-2 tablets (5-10 mg) by mouth every 4 hours as needed for pain. 200 tablet 0    QUEtiapine (SEROQUEL) 100 MG tablet Tale 1 tablet (100mg) with 1 tablet (400mg) for total of  500mg by mouth at bedtime 30 tablet 2    QUEtiapine (SEROQUEL) 400 MG tablet Take 1 tablet (400mg) with 1 tablet (100mg) for total of 500mg by mouth at bedtime 30 tablet 2    QUEtiapine (SEROQUEL) 50 MG tablet Take 0.5-1 tablets (25-50 mg) by mouth 2 times daily as needed (for sleep, mood and anxiety). 60 tablet 2    rivaroxaban ANTICOAGULANT (XARELTO) 20 MG TABS tablet Take 1 tablet (20 mg) by mouth daily (with dinner). 90 tablet 3    Rivaroxaban ANTICOAGULANT 15 & 20 MG TBPK Starter Therapy Pack Take 15 mg by mouth 2 times daily (with meals) for 21 days, THEN 20 mg daily with food for 9 days. 51 each 0    sertraline (ZOLOFT) 100 MG tablet Take 0.5 tablets (50 mg) by mouth daily. 30 tablet 1    Vitamin D3 (CHOLECALCIFEROL) 25 mcg (1000 units) tablet Take 1 tablet (25 mcg) by mouth daily. 90 tablet 3     No current facility-administered medications for this visit.       Pertinent Imaging Reviewed:  PET CT 2/3/25           BONES:   Multilevel degenerative changes of the spine, including stable mild  FDG uptake along the left C3-C4 articular facet, compatible with  inflammatory changes. Additional foci of degenerative uptake within  the bilateral coracoclavicular joints.      Newly hypermetabolic (max suv 5.8)  left iliac lesion at the anterior  aspect of the sacral iliac joint (series 4 image 242)   Stable size with decrease in FDG avidity of left acetabular focus, now  SUV max 3.67, previously 7.3. Stable size of known T7 vertebral body  lesion with increased corresponding FDG uptake, now 4.99 SUV max,  previously 3.24. Also increase in foci of hypermetabolism in T8 and  T9. Increased metabolic activity involving the right posterior sixth  rib, SUV max 3.74, previously 2.1.  New foci of hypermetabolism in the  right femoral head.     Redemonstration of inflammatory stranding involving the subcutaneous  soft tissues of the bilateral buttock with mild corresponding FDG  uptake, right greater than left, compatible with  "medication  administration/sequelae of trauma (series 4 image 248), SUV max 3.63.                                                                      IMPRESSION:   1. Mildly increased metabolic activity of 1.6 cm left breast mass when  compared to PET CT 9/24/2024, without significant change in size.  2. Increased metabolic activity within the left iliac, T7 vertebral  body, and right sixth rib concerning for osseous disease progression.  New foci in the right femoral head. Additional decreased metabolic  activity within the left acetabular focus.   3. Redemonstration of diffuse homogeneous uptake of the stomach,  nonspecific, but can be seen with gastritis in the appropriate  clinical setting.      Most Recent Labs:  Lab Results   Component Value Date    WBC 3.4 01/20/2025    WBC 5.4 06/23/2021     Lab Results   Component Value Date    RBC 3.02 01/20/2025    RBC 3.30 06/23/2021     Lab Results   Component Value Date    HGB 10.0 01/20/2025    HGB 10.3 06/23/2021     Lab Results   Component Value Date    HCT 30.5 01/20/2025    HCT 31.6 06/23/2021     Lab Results   Component Value Date     01/20/2025     06/23/2021    Last Comprehensive Metabolic Panel:  Lab Results   Component Value Date     01/20/2025    POTASSIUM 3.9 01/20/2025    CHLORIDE 110 (H) 01/20/2025    CO2 25 01/20/2025    ANIONGAP 10 01/20/2025     (H) 01/20/2025    BUN 12.8 01/20/2025    CR 0.97 (H) 01/20/2025    GFRESTIMATED 71 01/20/2025    NANDO 9.7 01/20/2025      INR   Date Value Ref Range Status   07/01/2024 1.11 0.85 - 1.15 Final   05/17/2021 1.03 0.86 - 1.14 Final          If requesting team would like sample sent for anything else please use the IR order set \"IR RAD Biopsy or Fluid Aspirate Specimens\" to select your necessary diagnostic labs and pend for admission.     If there are labs you desire that are not found in this order set or you have questions regarding specific diagnostic labs please call the associated lab " personnel.      For BONE, SOFT TISSUE, SARCOMA BX : 6 samples 13-14 gauge     RENO Rivas CNP  Interventional Radiology   [phone] (IR RN triage)

## 2025-02-05 NOTE — PROGRESS NOTES
Virtual Visit Details    Type of service:  Video Visit     Originating Location (pt. Location): Home    Distant Location (provider location):  Off-site  Platform used for Video Visit: [facility]              Oncology Visit:   Date on this visit: Feb 5, 2025    Diagnosis:  ER positive left breast cancer metastasized to bones.     Primary Physician: No Ref-Primary, Physician     History Of Present Illness:    Ms. Sanderson is a 49 year old female with a h/o tobacco abuse and DVTs with left breast cancer metastasized to bone. She presented to [facility] with back pain on 12/5/2020. MRI of the L-spine showed an abnormal L4 lesion with associated right paraspinal mass, abnormalities in L5 and the left iliac bone were also seen. CT C/A/P showed a left breast mass, lytic lesions of T7, L4, and the pelvis, and a 3 cm lesion in the kidney (thought to be a cyst). Ultrasound of the bilateral lower extremities showed a non-occlusive thrombus in the left popliteal vein. Mammogram and ultrasound of the bilateral breasts on 12/17/2020 showed a spiculated mass measuring at least 7.8 cm at 12-1:00 left breast extending from the nipple to 9 cm from the nipple with associated nipple retraction. This mass was biopsied, and showed IDC with surrounding DCIS, grade 3, ER+ 90%, and NC+ 75%.  HER2 was equivocal in approximately 35% of tumor cells by FISH and was negative by IHC.    Metastatic Breast Cancer Treatment:  12/23/2021 - 1/7/2021  Radiation (3000 cGy) to the lumbar spine.  1/29/2021 - present  Ibrance, zoladex, and anastrozole.  5/17/2021 radiofrequency ablation, kyphoplasty to L4  8/20/2021  Bilateral salpingo-oophorectomies, Ibrance and anastrozole.  She was off anastrozole 12/2021 - 2/2022 and off Ibrance 01/2022 - 02/2022 due to stress and impending homelessness. Off both again 03/2022-04/2022 due to death of her son but restarted in 05/2022.  04/2023  PET/CT with progression in 2 small metastases in the left pelvis.  Palbociclib  continued.  Anastrozole changed to exemestane  5/5/2023  Completed radiation, 2000 cGy in 5 fractions, to the left ilium.  12/19/2023 Left breast biopsy  [facility] NGS:   ER positive, 3+  100%   CO positive, 1+, 5%   HER2 negative.   AR positive, 1+, 35%   MMR proficient   PD-L1 negative, CPS 0   PTEN positive, 1+ 100%  *Of note, all of the above was IHC, DNA quantity not sufficient for NGS  1/18/24 - present  Fulvestrant + abemaciclib.  Abemaciclib dose reduced to 100 mg PO BID due to hand foot syndrome.  7/18/2024  completed radiation to the left acetabulum and the SI    Interval History: Teresa has been feeling poorly. She has been having a lot of low back, left sided SI joint pain for the past 2.5 weeks. She has been utilizing all her pain medications, even taking up to 3 oxycodone at a time without much relief. She otherwise has some leg stiffness but no focal R sided leg pain or rib pain.     She had 3 diarrhea episodes otherwise this has been okay. No nausea.     Appetite is down a bit but she has not lost weight. She is hydrating well.     No fevers/chills. No URI symptoms. Breathing okay. No bladder concerns.     She admits she does miss the morning dose of verzenio a few times per week due to sleeping in.       Past Medical/Surgical History:   Past Medical History:   Diagnosis Date    Anxiety     Breast CA (H) 12/2020    Depression     DVT (deep venous thrombosis) (H) 2014    Left breast mass     x approximately 4-5 months    Pulmonary emboli (H)     Pyelonephritis     Schizoaffective disorder (H)     Tobacco use      Past Surgical History:   Procedure Laterality Date    COLONOSCOPY N/A 7/8/2022    Procedure: COLONOSCOPY, WITH POLYPECTOMY;  Surgeon: Ham Cano MD;  [place] [facility] [place] LUMBAR KYPHOPLASTY VERTEBRAE  5/17/2021    LAPAROSCOPIC SALPINGO-OOPHORECTOMY Bilateral 8/20/2021    Procedure: BILATERAL SALPINGO-OOPHORECTOMY, LAPAROSCOPIC;  Surgeon: Rory Lopez MD;  [place] [facility] [place]     TUBAL LIGATION  1998        Allergies   Allergen Reactions    Contrast Dye      Pt developed nausea after isovue 370 injection on 6/9/21        Current Outpatient Medications   Medication Sig Dispense Refill    abemaciclib (VERZENIO) 100 MG tablet Take 1 tablet (100 mg) by mouth 2 times daily for 28 days 56 tablet 0    gabapentin (NEURONTIN) 300 MG capsule Take 3 capsules (900 mg) by mouth 3 times daily. 270 capsule 1    methocarbamol (ROBAXIN) 500 MG tablet Take 2-3 tablets (1,000-1,500 mg) by mouth 3 times daily as needed for muscle spasms. 210 tablet 2    methylPREDNISolone (MEDROL) 32 MG tablet 12 hours prior to scan and 2 hours prior to scan 2 tablet 3    OLANZapine (ZYPREXA) 5 MG tablet Take 1-2 tablets (5-10 mg) by mouth nightly as needed for sleep. Take 1/2 tablet (2.5mg) daily as needed for hypomania/ tom. 30 tablet 1    oxyCODONE (ROXICODONE) 5 MG tablet Take 1-2 tablets (5-10 mg) by mouth every 4 hours as needed for pain. 200 tablet 0    QUEtiapine (SEROQUEL) 100 MG tablet Tale 1 tablet (100mg) with 1 tablet (400mg) for total of 500mg by mouth at bedtime 30 tablet 2    QUEtiapine (SEROQUEL) 400 MG tablet Take 1 tablet (400mg) with 1 tablet (100mg) for total of 500mg by mouth at bedtime 30 tablet 2    QUEtiapine (SEROQUEL) 50 MG tablet Take 0.5-1 tablets (25-50 mg) by mouth 2 times daily as needed (for sleep, mood and anxiety). 60 tablet 2    rivaroxaban ANTICOAGULANT (XARELTO) 20 MG TABS tablet Take 1 tablet (20 mg) by mouth daily (with dinner). 90 tablet 3    Rivaroxaban ANTICOAGULANT 15 & 20 MG TBPK Starter Therapy Pack Take 15 mg by mouth 2 times daily (with meals) for 21 days, THEN 20 mg daily with food for 9 days. 51 each 0    sertraline (ZOLOFT) 100 MG tablet Take 0.5 tablets (50 mg) by mouth daily. 30 tablet 1    Vitamin D3 (CHOLECALCIFEROL) 25 mcg (1000 units) tablet Take 1 tablet (25 mcg) by mouth daily. 90 tablet 3     No current facility-administered medications for this visit.    '    Video physical exam  General: Patient appears well in no acute distress.   Skin: No visualized rash or lesions on visualized skin  Eyes: EOMI, no erythema, sclera icterus or discharge noted  Resp: Appears to be breathing comfortably without accessory muscle usage, speaking in full sentences, no cough  MSK: Appears to have normal range of motion based on visualized movements  Neurologic: No apparent tremors, facial movements symmetric  Psych: affect and mood congruent, alert and oriented      Laboratory/Imaging Studies:   I personally reviewed the below laboratories and images while in clinic today:     01/20/25 12:22   Sodium 145   Potassium 3.9   Chloride 110 (H)   Carbon Dioxide (CO2) 25   Urea Nitrogen 12.8   Creatinine 0.97 (H)   GFR Estimate 71   Calcium 9.7   Anion Gap 10   Albumin 3.9   Protein Total 7.1   Alkaline Phosphatase 81   ALT 10   AST 17   Bilirubin Total 0.2   Cancer Antigen 15-3 48 (H)   CEA 3.7   Glucose 126 (H)   WBC 3.4 (L)   Hemoglobin 10.0 (L)   Hematocrit 30.5 (L)   Platelet Count 178   RBC Count 3.02 (L)    (H)   MCH 33.1 (H)   MCHC 32.8   RDW 13.6   % Neutrophils 59   % Lymphocytes 30   % Monocytes 7   % Eosinophils 3   % Basophils 1   Absolute Basophils 0.0   Absolute Eosinophils 0.1   Absolute Immature Granulocytes 0.0   Absolute Lymphocytes 1.0   Absolute Monocytes 0.2   % Immature Granulocytes 0   Absolute Neutrophils 2.0   Absolute NRBCs 0.0   NRBCs per 100 WBC 0      10/30/24 11:46 11/25/24 13:42 12/05/24 08:22 12/23/24 15:05 01/20/25 12:22   Cancer Antigen 15-3 51 (H) 52 (H)  50 (H) 48 (H)   CEA 3.7 4.3 4.2 4.2 3.7       FINDINGS:      HEAD/NECK:  There is no suspicious FDG uptake in the head or neck.     Stable non-FDG avid 1.7 cm right thyroid nodule.     CHEST:  Stable size of 1.6 cm mass within the left breast, with minimally  increased hypermetabolic activity, now SUV max 4.54 (previously 3.5).      Mild degree of non-FDG avid groundglass opacities within the  left  lower lobe, likely atelectasis.     ABDOMEN AND PELVIS:  Redemonstration of diffuse FDG uptake throughout the stomach without  corresponding CT around it, however limited due to lack of intravenous  contrast and decompressed appearance.     Stable size and appearance of non-FDG avid partially exophytic cystic  lesion within the midpole of the right kidney. Unchanged focal  hyperdensities within the inferior vena cava, compatible with known  extravasation of L4 vertebroplasty cement components     LOWER EXTREMITIES:   No abnormal masses or hypermetabolic lesions.     BONES:   Multilevel degenerative changes of the spine, including stable mild  FDG uptake along the left C3-C4 articular facet, compatible with  inflammatory changes. Additional foci of degenerative uptake within  the bilateral coracoclavicular joints.      Newly hypermetabolic (max suv 5.8)  left iliac lesion at the anterior  aspect of the sacral iliac joint (series 4 image 242)   Stable size with decrease in FDG avidity of left acetabular focus, now  SUV max 3.67, previously 7.3. Stable size of known T7 vertebral body  lesion with increased corresponding FDG uptake, now 4.99 SUV max,  previously 3.24. Also increase in foci of hypermetabolism in T8 and  T9. Increased metabolic activity involving the right posterior sixth  rib, SUV max 3.74, previously 2.1.  New foci of hypermetabolism in the  right femoral head.     Redemonstration of inflammatory stranding involving the subcutaneous  soft tissues of the bilateral buttock with mild corresponding FDG  uptake, right greater than left, compatible with medication  administration/sequelae of trauma (series 4 image 248), SUV max 3.63.                                                                      IMPRESSION:   1. Mildly increased metabolic activity of 1.6 cm left breast mass when  compared to PET CT 9/24/2024, without significant change in size.  2. Increased metabolic activity within the left  iliac, T7 vertebral  body, and right sixth rib concerning for osseous disease progression.  New foci in the right femoral head. Additional decreased metabolic  activity within the left acetabular focus.   3. Redemonstration of diffuse homogeneous uptake of the stomach,  nonspecific, but can be seen with gastritis in the appropriate  clinical setting.     I have personally reviewed the examination and initial interpretation  and I agree with the findings.     MALENA FARAH MD       ASSESSMENT/PLAN:   49 year old female with history of DVT and ER positive left breast cancer metastasized to bones.    1.  Metastatic breast cancer: She has been on her most recent treatment with abemaciclib and fulvestrant since 01/2024.    - Reviewed PET/CT from a few days ago showing some mixed response but concerns for progression in bones and breast with more avid breast lesion, T7 lesion, a few rib lesions, and new sites of disease in R femur and L sacroiliac area.    -There has been concern of some non compliance to treatment and Teresa confirms this today. Will have her step up compliance and stay on same treatment for now. In the meantime will order bone biopsy for tissue NGS. This was attempted in the past and we had insufficient sample to run NGS. This will guide next line of treatment. Discussed with Dr. Plunkett.   -Abemaciclib dose reduced to 100 mg PO BID secondary to hand foot syndrome.  Symptoms improved with the dose reduction.  - Continue monthly fulvestrant.   - Recommend tissue biopsy vs Guardant 360 liquid NGS at time of progression to help guide next treatment (capivasertib vs everolimus vs elacestrant)    2.  Bone metastases/low back pain with LLE sciatica:  She is s/p XRT to the left ilium as well as the lumbar spine and kyphoplasty of L4--with some extravasation of cement which procedularist is aware of and recommended continued AC indefinitely.   - most recently received radiation to the left acetabulum  and the SI, completed on 7/18/2024.  - She is currently taking oxycodone, Robaxin, and gabapentin.  Ongoing follow up with palliative medicine. Will help facilitate closer follow-up with them given difficulty with pain control.   -She has joint pain and stiffness consistent AI induced arthralgias as well. Cymbalta can be used for this however this may be difficult to try with her other psychiatric medications. Encouraged stretching, routine physical activity, and continuing vitamin D as these help as well.   - Receiving Zometa once every 3 months. Next due early March 2025.     3.  Schizoaffective disorder, bipolar type: No change with last visit.  She is on treatment with Seroquel and Zoloft. Ongoing follow up with psychiatry.     4.  FDG uptake stomach: Seen on last few PETs. Diagnostic EGD referral was placed at last visit--patient did not schedule. She is having very minimal reflux symptoms at this time.     6.  FELTON:  Secondary to abemaciclib. Cr is stable. Continue to avoid NSAIDs and encouraged good hydration.       45 minutes spent on the date of the encounter doing chart review, review of test results, interpretation of tests, patient visit, and documentation       The longitudinal plan of care for the diagnosis(es)/condition(s) as documented were addressed during this visit. Due to the added complexity in care, I will continue to support Teresa in the subsequent management and with ongoing continuity of care.      Alee De Los Santos PA-C

## 2025-02-07 ENCOUNTER — MYC MEDICAL ADVICE (OUTPATIENT)
Dept: INTERVENTIONAL RADIOLOGY/VASCULAR | Facility: CLINIC | Age: 50
End: 2025-02-07
Payer: MEDICARE

## 2025-02-11 ENCOUNTER — PATIENT OUTREACH (OUTPATIENT)
Dept: CARE COORDINATION | Facility: CLINIC | Age: 50
End: 2025-02-11
Payer: MEDICARE

## 2025-02-11 NOTE — PROGRESS NOTES
"Social Work - Distress Screen Intervention  [facility]    Identified Concern and Score from Distress Screenin. How concerned are you about your ability to eat? 0     2. How concerned are you about unintended weight loss or your current weight? 0     3. How concerned are you about feeling depressed or very sad?  (!) 10     4. How concerned are you about feeling anxious or very scared?  (!) 10     5. Do you struggle with the loss of meaning and david in your life?  (!) A great deal     6. How concerned are you about work and home life issues that may be affected by your cancer?  (!) 8     7. How concerned are you about knowing what resources are available to help you?  (!) 10     8. Do you currently have what you would describe as Islam or spiritual struggles? Quite a bit     9. If you want to be contacted by one of our professionals, I can send a message to them right now.  No data recorded     Date of Distress Screen: 25  Data: At time of last visit, patient scored positive on distress screening.  outreached to patient today to follow up on elevated distress and introduce psychosocial services and support.  Intervention/Education provided:  contacted patient by phone to discuss distress screening results. Pt reports back pain at time of call. Her care team is aware. She shared she has been sad lately and crying more. She met with her psychiatrist yesterday. She is also concerned about her finances. She just moved into a new apartment (subsidized housing) but has additional expenses. She wasn't sure what cancer grants she has used in the past. SW will send information via [employer]. Emotional support provided. Pt shared, \"thanks for putting a smile on my face.\"    Follow-up Required:   SW remains available for resource coordination and emotional support.   Malgorzata Sutherland, DANA, [facility]  Adult Oncology Clinics  [place] (M,W), [place] (T) & [place] (Th)  *I am off " Friday  Office: [phone]

## 2025-02-12 ENCOUNTER — PATIENT OUTREACH (OUTPATIENT)
Dept: ONCOLOGY | Facility: CLINIC | Age: 50
End: 2025-02-12
Payer: MEDICARE

## 2025-02-12 NOTE — PROGRESS NOTES
Called Pt to relay, Per Alee G & IR, that Pt needs to hold the 2 doses of Xarelto prior to her 2/18 Bmbx.    Pt verbalized understanding.

## 2025-02-17 DIAGNOSIS — C50.812 MALIGNANT NEOPLASM OF OVERLAPPING SITES OF LEFT BREAST IN FEMALE, ESTROGEN RECEPTOR POSITIVE (H): ICD-10-CM

## 2025-02-17 DIAGNOSIS — Z17.0 MALIGNANT NEOPLASM OF OVERLAPPING SITES OF LEFT BREAST IN FEMALE, ESTROGEN RECEPTOR POSITIVE (H): ICD-10-CM

## 2025-02-17 DIAGNOSIS — G89.3 CANCER ASSOCIATED PAIN: ICD-10-CM

## 2025-02-17 RX ORDER — OXYCODONE HYDROCHLORIDE 5 MG/1
5-10 TABLET ORAL EVERY 4 HOURS PRN
Qty: 200 TABLET | Refills: 0 | Status: SHIPPED | OUTPATIENT
Start: 2025-02-17

## 2025-02-17 NOTE — TELEPHONE ENCOUNTER
Resending oxycodone prescription to a different pharmacy who has it in stock.    MAXI CarvajalN, RN  Palliative Care Nurse Clinician    [phone] (Direct)  [phone] (Main)  [phone] (Appointment Scheduling)

## 2025-02-18 ENCOUNTER — APPOINTMENT (OUTPATIENT)
Dept: INTERVENTIONAL RADIOLOGY/VASCULAR | Facility: CLINIC | Age: 50
End: 2025-02-18
Attending: PHYSICIAN ASSISTANT
Payer: MEDICARE

## 2025-02-18 ENCOUNTER — HOSPITAL ENCOUNTER (OUTPATIENT)
Facility: CLINIC | Age: 50
Discharge: HOME | End: 2025-02-18
Attending: STUDENT IN AN ORGANIZED HEALTH CARE EDUCATION/TRAINING PROGRAM | Admitting: STUDENT IN AN ORGANIZED HEALTH CARE EDUCATION/TRAINING PROGRAM
Payer: MEDICARE

## 2025-02-18 ENCOUNTER — APPOINTMENT (OUTPATIENT)
Dept: MEDSURG UNIT | Facility: CLINIC | Age: 50
End: 2025-02-18
Attending: STUDENT IN AN ORGANIZED HEALTH CARE EDUCATION/TRAINING PROGRAM
Payer: MEDICARE

## 2025-02-18 VITALS
DIASTOLIC BLOOD PRESSURE: 87 MMHG | HEIGHT: 72 IN | HEART RATE: 74 BPM | WEIGHT: 242 LBS | TEMPERATURE: 97.8 F | SYSTOLIC BLOOD PRESSURE: 123 MMHG | BODY MASS INDEX: 32.78 KG/M2 | OXYGEN SATURATION: 98 % | RESPIRATION RATE: 16 BRPM

## 2025-02-18 DIAGNOSIS — C79.51 CARCINOMA OF LEFT BREAST METASTATIC TO BONE (H): ICD-10-CM

## 2025-02-18 DIAGNOSIS — C50.812 MALIGNANT NEOPLASM OF OVERLAPPING SITES OF LEFT BREAST IN FEMALE, ESTROGEN RECEPTOR POSITIVE (H): ICD-10-CM

## 2025-02-18 DIAGNOSIS — C50.912 CARCINOMA OF LEFT BREAST METASTATIC TO BONE (H): ICD-10-CM

## 2025-02-18 DIAGNOSIS — Z17.0 MALIGNANT NEOPLASM OF OVERLAPPING SITES OF LEFT BREAST IN FEMALE, ESTROGEN RECEPTOR POSITIVE (H): ICD-10-CM

## 2025-02-18 LAB — INR PPP: 0.96 (ref 0.85–1.15)

## 2025-02-18 PROCEDURE — 250N000011 HC RX IP 250 OP 636

## 2025-02-18 PROCEDURE — 77012 CT SCAN FOR NEEDLE BIOPSY: CPT | Mod: 26 | Performed by: RADIOLOGY

## 2025-02-18 PROCEDURE — 88307 TISSUE EXAM BY PATHOLOGIST: CPT | Mod: 26 | Performed by: PATHOLOGY

## 2025-02-18 PROCEDURE — 999N000132 HC STATISTIC PP CARE STAGE 1

## 2025-02-18 PROCEDURE — 88311 DECALCIFY TISSUE: CPT | Mod: 26 | Performed by: PATHOLOGY

## 2025-02-18 PROCEDURE — 250N000011 HC RX IP 250 OP 636: Performed by: STUDENT IN AN ORGANIZED HEALTH CARE EDUCATION/TRAINING PROGRAM

## 2025-02-18 PROCEDURE — 88360 TUMOR IMMUNOHISTOCHEM/MANUAL: CPT | Mod: 26 | Performed by: PATHOLOGY

## 2025-02-18 PROCEDURE — 88342 IMHCHEM/IMCYTCHM 1ST ANTB: CPT | Mod: 26 | Performed by: PATHOLOGY

## 2025-02-18 PROCEDURE — 272N000155 HC KIT CR15

## 2025-02-18 PROCEDURE — 250N000009 HC RX 250

## 2025-02-18 PROCEDURE — 88342 IMHCHEM/IMCYTCHM 1ST ANTB: CPT | Mod: TC | Performed by: PHYSICIAN ASSISTANT

## 2025-02-18 PROCEDURE — 85610 PROTHROMBIN TIME: CPT | Performed by: NURSE PRACTITIONER

## 2025-02-18 PROCEDURE — 99152 MOD SED SAME PHYS/QHP 5/>YRS: CPT

## 2025-02-18 PROCEDURE — 250N000011 HC RX IP 250 OP 636: Performed by: RADIOLOGY

## 2025-02-18 PROCEDURE — 999N000142 HC STATISTIC PROCEDURE PREP ONLY

## 2025-02-18 PROCEDURE — 20225 BONE BIOPSY TROCAR/NDL DEEP: CPT | Performed by: RADIOLOGY

## 2025-02-18 PROCEDURE — 272N000151 HC KIT CR11

## 2025-02-18 PROCEDURE — 99152 MOD SED SAME PHYS/QHP 5/>YRS: CPT | Performed by: RADIOLOGY

## 2025-02-18 PROCEDURE — 36415 COLL VENOUS BLD VENIPUNCTURE: CPT | Performed by: NURSE PRACTITIONER

## 2025-02-18 PROCEDURE — 77012 CT SCAN FOR NEEDLE BIOPSY: CPT

## 2025-02-18 RX ORDER — NALOXONE HYDROCHLORIDE 0.4 MG/ML
0.4 INJECTION, SOLUTION INTRAMUSCULAR; INTRAVENOUS; SUBCUTANEOUS
Status: DISCONTINUED | OUTPATIENT
Start: 2025-02-18 | End: 2025-02-18 | Stop reason: HOSPADM

## 2025-02-18 RX ORDER — NALOXONE HYDROCHLORIDE 0.4 MG/ML
0.2 INJECTION, SOLUTION INTRAMUSCULAR; INTRAVENOUS; SUBCUTANEOUS
Status: DISCONTINUED | OUTPATIENT
Start: 2025-02-18 | End: 2025-02-18 | Stop reason: HOSPADM

## 2025-02-18 RX ORDER — FENTANYL CITRATE 50 UG/ML
25-50 INJECTION, SOLUTION INTRAMUSCULAR; INTRAVENOUS EVERY 5 MIN PRN
Status: DISCONTINUED | OUTPATIENT
Start: 2025-02-18 | End: 2025-02-18 | Stop reason: HOSPADM

## 2025-02-18 RX ORDER — FLUMAZENIL 0.1 MG/ML
0.2 INJECTION, SOLUTION INTRAVENOUS
Status: DISCONTINUED | OUTPATIENT
Start: 2025-02-18 | End: 2025-02-18 | Stop reason: HOSPADM

## 2025-02-18 RX ORDER — LIDOCAINE 40 MG/G
CREAM TOPICAL
Status: DISCONTINUED | OUTPATIENT
Start: 2025-02-18 | End: 2025-02-18 | Stop reason: HOSPADM

## 2025-02-18 RX ORDER — DIPHENHYDRAMINE HYDROCHLORIDE 50 MG/ML
25-50 INJECTION INTRAMUSCULAR; INTRAVENOUS
Status: COMPLETED | OUTPATIENT
Start: 2025-02-18 | End: 2025-02-18

## 2025-02-18 RX ADMIN — FENTANYL CITRATE 50 MCG: 50 INJECTION, SOLUTION INTRAMUSCULAR; INTRAVENOUS at 11:53

## 2025-02-18 RX ADMIN — DIPHENHYDRAMINE HYDROCHLORIDE 50 MG: 50 INJECTION, SOLUTION INTRAMUSCULAR; INTRAVENOUS at 11:55

## 2025-02-18 RX ADMIN — FENTANYL CITRATE 50 MCG: 50 INJECTION, SOLUTION INTRAMUSCULAR; INTRAVENOUS at 11:57

## 2025-02-18 RX ADMIN — MIDAZOLAM 1 MG: 1 INJECTION INTRAMUSCULAR; INTRAVENOUS at 11:58

## 2025-02-18 RX ADMIN — MIDAZOLAM 0.5 MG: 1 INJECTION INTRAMUSCULAR; INTRAVENOUS at 12:08

## 2025-02-18 RX ADMIN — MIDAZOLAM 1 MG: 1 INJECTION INTRAMUSCULAR; INTRAVENOUS at 11:59

## 2025-02-18 RX ADMIN — MIDAZOLAM 1 MG: 1 INJECTION INTRAMUSCULAR; INTRAVENOUS at 11:53

## 2025-02-18 RX ADMIN — FENTANYL CITRATE 50 MCG: 50 INJECTION, SOLUTION INTRAMUSCULAR; INTRAVENOUS at 12:05

## 2025-02-18 RX ADMIN — MIDAZOLAM 0.5 MG: 1 INJECTION INTRAMUSCULAR; INTRAVENOUS at 12:05

## 2025-02-18 RX ADMIN — FENTANYL CITRATE 50 MCG: 50 INJECTION, SOLUTION INTRAMUSCULAR; INTRAVENOUS at 12:09

## 2025-02-18 RX ADMIN — FENTANYL CITRATE 50 MCG: 50 INJECTION, SOLUTION INTRAMUSCULAR; INTRAVENOUS at 12:16

## 2025-02-18 RX ADMIN — LIDOCAINE HYDROCHLORIDE 30 ML: 10 INJECTION, SOLUTION EPIDURAL; INFILTRATION; INTRACAUDAL; PERINEURAL at 12:09

## 2025-02-18 RX ADMIN — MIDAZOLAM 1 MG: 1 INJECTION INTRAMUSCULAR; INTRAVENOUS at 12:15

## 2025-02-18 ASSESSMENT — ACTIVITIES OF DAILY LIVING (ADL)
ADLS_ACUITY_SCORE: 51

## 2025-02-18 NOTE — PROGRESS NOTES
Patient tolerated recovery stage well. VSS, left lower back site clean/dry/intact, no hematoma, and denies pain at site. Patient tolerated PO food and fluids. Teaching was done and discharge instructions were given. Patient ambulated, voided, and PIV was removed. Patient discharged from the hospital to home with family, ride medical transport.

## 2025-02-18 NOTE — PROGRESS NOTES
Pt arrived back from bone biopsy. VSS on RA, pt very sleepy. Will offer food and drink once pt is more arousable. Left lower back site with primapore, C/D/I. Awaiting for post-procedure orders.

## 2025-02-18 NOTE — PROGRESS NOTES
Patient Name: Teresa Sanderson  Medical Record Number: [id number]  Today's Date: 2/18/2025    Procedure: Image guided left iliac bone biopsy  Proceduralist: Dr. Driscoll  Pathology present: NO    Procedure Start: 1203  Procedure end: 1220  Sedation medications administered: Versed 5 mg and Fentanyl 250 mcg  Other medications:  Benadryl 50 mg       Report given to: RICHARD Seo  : MILAN    Other Notes: Pt arrived to IR room CT2 from . Consent reviewed. Pt denies any questions or concerns regarding procedure. Pt positioned prone and monitored per protocol. Pt tolerated procedure without any noted complications. Pt transferred back to .

## 2025-02-18 NOTE — PRE-PROCEDURE
GENERAL PRE-PROCEDURE:   Procedure:  Image-guided left iliac bone biopsy  Date/Time:  2/18/2025 9:57 AM    Written consent obtained?: Yes    Risks and benefits: Risks, benefits and alternatives were discussed    Consent given by:  Patient  Patient states understanding of procedure being performed: Yes    Patient's understanding of procedure matches consent: Yes    Procedure consent matches procedure scheduled: Yes    Expected level of sedation:  Moderate  Appropriately NPO:  Yes  ASA Class:  2  Mallampati  :  Grade 2- soft palate, base of uvula, tonsillar pillars, and portion of posterior pharyngeal wall visible  Lungs:  Lungs clear with good breath sounds bilaterally  Heart:  Normal heart sounds and rate  History & Physical reviewed:  History and physical reviewed and no updates needed  Statement of review:  I have reviewed the lab findings, diagnostic data, medications, and the plan for sedation

## 2025-02-18 NOTE — PROCEDURES
[facility]    Procedure: IR Procedure Note    Date/Time: 2/18/2025 12:37 PM    Performed by: Live Driscoll MD  Authorized by: Live Driscoll MD  IR Fellow Physician:    Pre Procedure Diagnosis: Left iliac lesion. Left breast cancer.  Post Procedure Diagnosis: same    UNIVERSAL PROTOCOL   Site Marked: NA  Prior Images Obtained and Reviewed:  Yes  Required items: Required blood products, implants, devices and special equipment available    Patient identity confirmed:  Arm band, provided demographic data, hospital-assigned identification number and verbally with patient  Patient was reevaluated immediately before administering moderate or deep sedation or anesthesia  Confirmation Checklist:  Correct equipment/implants were available, procedure was appropriate and matched the consent or emergent situation, relevant allergies and patient's identity using two indicators  Time out: Immediately prior to the procedure a time out was called    Universal Protocol: the Joint Commission Universal Protocol was followed    Preparation: Patient was prepped and draped in usual sterile fashion    ESBL (mL):  2     ANESTHESIA    Anesthesia:  Local infiltration  Local Anesthetic:  Lidocaine 1% without epinephrine  Anesthetic Total (mL):  10      SEDATION  Patient Sedated: Yes    Sedation:  Fentanyl and midazolam  Vital signs: Vital signs monitored during sedation    See dictated procedure note for full details.  Findings: 11 gauge bone access cannula advanced to the left iliac target lesion under CT guidance. Single 13 gauge core obtained and submitted in formalin.    Cannulas removed.    No immediate complication.    Sterile dressing applied.    Specimens: none    Procedural Complications: None    Condition: Stable      PROCEDURE    Patient Tolerance:  Patient tolerated the procedure well with no immediate complications  Length of time physician/provider present for 1:1  monitoring during sedation:  23-37 min

## 2025-02-18 NOTE — DISCHARGE INSTRUCTIONS
[facility]    Interventional Radiology  Patient Instructions Following Biopsy    AFTER YOU GO HOME  If you were given sedation, for the first 24 hours: we recommend that an adult stay with you, DO NOT drive or operate machinery at home or at work, DO NOT smoke, DO NOT make any important or legal decisions.  DO NOT drink alcoholic beverages the day of your procedure  Drink plenty of fluids   Resume your regular diet  Resume your regular medications  Relax and take it easy for 48 hours  DO NOT do any strenuous exercise or lifting (> 10 lbs) for at least 3 days following your procedure  Keep the dressing dry and in place until tomorrow. Replace with Band aid for 2 days.  Never leave a wet dressing in place.  You may shower tomorrow (then remove the wet dressing). No tub bath, hot tub, or swimming for 5 days.  There should be minimum drainage from the biopsy site    CALL THE PHYSICIAN IF:  You start bleeding from the procedure site.  If you do start to bleed from that site, lie down flat and hold pressure on the site for a minimum of 10 minutes.  Your physician will tell you if you need to return to the hospital  You develop nausea or vomiting  You have excessive swelling, redness, or tenderness at the site  You have drainage that looks like it is infected.  You experience severe pain  You develop hives or a rash or unexplained itching  You develop shortness of breath  You develop a temperature of 101 degrees F or greater  You develop bloody clots or red urine after you are discharged  You develop chest pain or cough up blood, lightheadedness or fainting    [facility] INTERVENTIONAL RADIOLOGY DEPARTMENT  Procedure Physician:         Dr. Driscoll                             Date of procedure: February 18, 2025  Telephone Numbers: [phone]      Monday-Friday 7:30 am to 4:00 pm  [phone] After 4:00 pm Monday-Friday, Weekends & Holidays.   Ask for the [facility] Interventional Radiology fellow on call.  Someone  is on call 24 hrs/day  [facility] toll free number: [phone] Monday-Friday 8:00 am to 4:30 pm  [facility] Emergency Dept: [phone]

## 2025-02-20 LAB
PATH REPORT.COMMENTS IMP SPEC: ABNORMAL
PATH REPORT.COMMENTS IMP SPEC: YES
PATH REPORT.FINAL DX SPEC: ABNORMAL
PATH REPORT.GROSS SPEC: ABNORMAL
PATH REPORT.MICROSCOPIC SPEC OTHER STN: ABNORMAL
PATH REPORT.RELEVANT HX SPEC: ABNORMAL
PHOTO IMAGE: ABNORMAL

## 2025-02-26 ENCOUNTER — PATIENT OUTREACH (OUTPATIENT)
Dept: ONCOLOGY | Facility: CLINIC | Age: 50
End: 2025-02-26
Payer: MEDICARE

## 2025-03-04 ENCOUNTER — TRANSFERRED RECORDS (OUTPATIENT)
Dept: HEALTH INFORMATION MANAGEMENT | Facility: CLINIC | Age: 50
End: 2025-03-04

## 2025-03-04 ENCOUNTER — OFFICE VISIT (OUTPATIENT)
Dept: INTERNAL MEDICINE | Facility: CLINIC | Age: 50
End: 2025-03-04
Payer: MEDICARE

## 2025-03-04 ENCOUNTER — LAB (OUTPATIENT)
Dept: LAB | Facility: CLINIC | Age: 50
End: 2025-03-04
Payer: MEDICARE

## 2025-03-04 VITALS
OXYGEN SATURATION: 99 % | DIASTOLIC BLOOD PRESSURE: 77 MMHG | BODY MASS INDEX: 32.47 KG/M2 | HEART RATE: 89 BPM | SYSTOLIC BLOOD PRESSURE: 110 MMHG | RESPIRATION RATE: 16 BRPM | HEIGHT: 72 IN | WEIGHT: 239.7 LBS | TEMPERATURE: 97.7 F

## 2025-03-04 DIAGNOSIS — Z17.0 MALIGNANT NEOPLASM OF OVERLAPPING SITES OF LEFT BREAST IN FEMALE, ESTROGEN RECEPTOR POSITIVE (H): Chronic | ICD-10-CM

## 2025-03-04 DIAGNOSIS — Z23 NEED FOR PROPHYLACTIC VACCINATION AGAINST HEPATITIS B VIRUS: ICD-10-CM

## 2025-03-04 DIAGNOSIS — C79.51 CARCINOMA OF LEFT BREAST METASTATIC TO BONE (H): ICD-10-CM

## 2025-03-04 DIAGNOSIS — C79.51 CARCINOMA OF LEFT BREAST METASTATIC TO BONE (H): Primary | ICD-10-CM

## 2025-03-04 DIAGNOSIS — F41.9 ANXIETY DISORDER, UNSPECIFIED TYPE: ICD-10-CM

## 2025-03-04 DIAGNOSIS — G89.3 CANCER ASSOCIATED PAIN: Chronic | ICD-10-CM

## 2025-03-04 DIAGNOSIS — R73.03 PREDIABETES: ICD-10-CM

## 2025-03-04 DIAGNOSIS — F25.0 SCHIZOAFFECTIVE DISORDER, BIPOLAR TYPE (H): ICD-10-CM

## 2025-03-04 DIAGNOSIS — R30.0 DYSURIA: ICD-10-CM

## 2025-03-04 DIAGNOSIS — C50.912 CARCINOMA OF LEFT BREAST METASTATIC TO BONE (H): ICD-10-CM

## 2025-03-04 DIAGNOSIS — C50.812 MALIGNANT NEOPLASM OF OVERLAPPING SITES OF LEFT BREAST IN FEMALE, ESTROGEN RECEPTOR POSITIVE (H): ICD-10-CM

## 2025-03-04 DIAGNOSIS — Z00.00 MEDICARE ANNUAL WELLNESS VISIT, INITIAL: Primary | Chronic | ICD-10-CM

## 2025-03-04 DIAGNOSIS — C79.51 METASTASIS TO BONE (H): Chronic | ICD-10-CM

## 2025-03-04 DIAGNOSIS — Z23 NEED FOR TDAP VACCINATION: ICD-10-CM

## 2025-03-04 DIAGNOSIS — C50.812 MALIGNANT NEOPLASM OF OVERLAPPING SITES OF LEFT BREAST IN FEMALE, ESTROGEN RECEPTOR POSITIVE (H): Chronic | ICD-10-CM

## 2025-03-04 DIAGNOSIS — C50.912 CARCINOMA OF LEFT BREAST METASTATIC TO BONE (H): Primary | ICD-10-CM

## 2025-03-04 DIAGNOSIS — Z17.0 MALIGNANT NEOPLASM OF OVERLAPPING SITES OF LEFT BREAST IN FEMALE, ESTROGEN RECEPTOR POSITIVE (H): ICD-10-CM

## 2025-03-04 LAB
ALBUMIN SERPL BCG-MCNC: 4.1 G/DL (ref 3.5–5.2)
ALBUMIN UR-MCNC: 10 MG/DL
ALP SERPL-CCNC: 87 U/L (ref 40–150)
ALT SERPL W P-5'-P-CCNC: 13 U/L (ref 0–50)
ANION GAP SERPL CALCULATED.3IONS-SCNC: 11 MMOL/L (ref 7–15)
APPEARANCE UR: CLEAR
AST SERPL W P-5'-P-CCNC: 15 U/L (ref 0–45)
BASOPHILS # BLD AUTO: 0 10E3/UL (ref 0–0.2)
BASOPHILS NFR BLD AUTO: 1 %
BILIRUB SERPL-MCNC: 0.2 MG/DL
BILIRUB UR QL STRIP: NEGATIVE
BUN SERPL-MCNC: 12.7 MG/DL (ref 6–20)
CALCIUM SERPL-MCNC: 9.1 MG/DL (ref 8.8–10.4)
CANCER AG15-3 SERPL-ACNC: 57 U/ML
CEA SERPL-MCNC: 4.3 NG/ML
CHLORIDE SERPL-SCNC: 106 MMOL/L (ref 98–107)
COLOR UR AUTO: YELLOW
CREAT SERPL-MCNC: 1.09 MG/DL (ref 0.51–0.95)
EGFRCR SERPLBLD CKD-EPI 2021: 62 ML/MIN/1.73M2
EOSINOPHIL # BLD AUTO: 0.1 10E3/UL (ref 0–0.7)
EOSINOPHIL NFR BLD AUTO: 3 %
ERYTHROCYTE [DISTWIDTH] IN BLOOD BY AUTOMATED COUNT: 14.3 % (ref 10–15)
EST. AVERAGE GLUCOSE BLD GHB EST-MCNC: 128 MG/DL
GLUCOSE SERPL-MCNC: 138 MG/DL (ref 70–99)
GLUCOSE UR STRIP-MCNC: NEGATIVE MG/DL
HBA1C MFR BLD: 6.1 %
HCO3 SERPL-SCNC: 26 MMOL/L (ref 22–29)
HCT VFR BLD AUTO: 32.8 % (ref 35–47)
HGB BLD-MCNC: 10.6 G/DL (ref 11.7–15.7)
HGB UR QL STRIP: NEGATIVE
HYALINE CASTS: 4 /LPF
IMM GRANULOCYTES # BLD: 0 10E3/UL
IMM GRANULOCYTES NFR BLD: 1 %
KETONES UR STRIP-MCNC: NEGATIVE MG/DL
LEUKOCYTE ESTERASE UR QL STRIP: ABNORMAL
LYMPHOCYTES # BLD AUTO: 1.2 10E3/UL (ref 0.8–5.3)
LYMPHOCYTES NFR BLD AUTO: 35 %
MCH RBC QN AUTO: 34.2 PG (ref 26.5–33)
MCHC RBC AUTO-ENTMCNC: 32.3 G/DL (ref 31.5–36.5)
MCV RBC AUTO: 106 FL (ref 78–100)
MONOCYTES # BLD AUTO: 0.2 10E3/UL (ref 0–1.3)
MONOCYTES NFR BLD AUTO: 6 %
MUCOUS THREADS #/AREA URNS LPF: PRESENT /LPF
NEUTROPHILS # BLD AUTO: 1.8 10E3/UL (ref 1.6–8.3)
NEUTROPHILS NFR BLD AUTO: 54 %
NITRATE UR QL: NEGATIVE
NRBC # BLD AUTO: 0 10E3/UL
NRBC BLD AUTO-RTO: 0 /100
PH UR STRIP: 5.5 [PH] (ref 5–7)
PLATELET # BLD AUTO: 163 10E3/UL (ref 150–450)
POTASSIUM SERPL-SCNC: 3.4 MMOL/L (ref 3.4–5.3)
PROT SERPL-MCNC: 7.5 G/DL (ref 6.4–8.3)
RBC # BLD AUTO: 3.1 10E6/UL (ref 3.8–5.2)
RBC URINE: 2 /HPF
SODIUM SERPL-SCNC: 143 MMOL/L (ref 135–145)
SP GR UR STRIP: 1.03 (ref 1–1.03)
SQUAMOUS EPITHELIAL: 1 /HPF
UROBILINOGEN UR STRIP-MCNC: NORMAL MG/DL
WBC # BLD AUTO: 3.4 10E3/UL (ref 4–11)
WBC URINE: 8 /HPF

## 2025-03-04 PROCEDURE — 99000 SPECIMEN HANDLING OFFICE-LAB: CPT | Performed by: PATHOLOGY

## 2025-03-04 PROCEDURE — 3078F DIAST BP <80 MM HG: CPT

## 2025-03-04 PROCEDURE — 80053 COMPREHEN METABOLIC PANEL: CPT | Performed by: PATHOLOGY

## 2025-03-04 PROCEDURE — 83036 HEMOGLOBIN GLYCOSYLATED A1C: CPT | Performed by: INTERNAL MEDICINE

## 2025-03-04 PROCEDURE — 3074F SYST BP LT 130 MM HG: CPT

## 2025-03-04 PROCEDURE — 85025 COMPLETE CBC W/AUTO DIFF WBC: CPT | Performed by: PATHOLOGY

## 2025-03-04 PROCEDURE — 86300 IMMUNOASSAY TUMOR CA 15-3: CPT | Performed by: PHYSICIAN ASSISTANT

## 2025-03-04 PROCEDURE — G0438 PPPS, INITIAL VISIT: HCPCS | Mod: GE

## 2025-03-04 PROCEDURE — 81001 URINALYSIS AUTO W/SCOPE: CPT | Performed by: PATHOLOGY

## 2025-03-04 PROCEDURE — 82378 CARCINOEMBRYONIC ANTIGEN: CPT | Performed by: PHYSICIAN ASSISTANT

## 2025-03-04 SDOH — HEALTH STABILITY: PHYSICAL HEALTH: ON AVERAGE, HOW MANY DAYS PER WEEK DO YOU ENGAGE IN MODERATE TO STRENUOUS EXERCISE (LIKE A BRISK WALK)?: 1 DAY

## 2025-03-04 SDOH — HEALTH STABILITY: PHYSICAL HEALTH: ON AVERAGE, HOW MANY MINUTES DO YOU ENGAGE IN EXERCISE AT THIS LEVEL?: 60 MIN

## 2025-03-04 ASSESSMENT — ANXIETY QUESTIONNAIRES
GAD7 TOTAL SCORE: 17
GAD7 TOTAL SCORE: 17
2. NOT BEING ABLE TO STOP OR CONTROL WORRYING: NEARLY EVERY DAY
IF YOU CHECKED OFF ANY PROBLEMS ON THIS QUESTIONNAIRE, HOW DIFFICULT HAVE THESE PROBLEMS MADE IT FOR YOU TO DO YOUR WORK, TAKE CARE OF THINGS AT HOME, OR GET ALONG WITH OTHER PEOPLE: VERY DIFFICULT
7. FEELING AFRAID AS IF SOMETHING AWFUL MIGHT HAPPEN: NEARLY EVERY DAY
3. WORRYING TOO MUCH ABOUT DIFFERENT THINGS: NEARLY EVERY DAY
6. BECOMING EASILY ANNOYED OR IRRITABLE: MORE THAN HALF THE DAYS
5. BEING SO RESTLESS THAT IT IS HARD TO SIT STILL: SEVERAL DAYS
1. FEELING NERVOUS, ANXIOUS, OR ON EDGE: MORE THAN HALF THE DAYS

## 2025-03-04 ASSESSMENT — SOCIAL DETERMINANTS OF HEALTH (SDOH): HOW OFTEN DO YOU GET TOGETHER WITH FRIENDS OR RELATIVES?: NEVER

## 2025-03-04 ASSESSMENT — PATIENT HEALTH QUESTIONNAIRE - PHQ9
SUM OF ALL RESPONSES TO PHQ QUESTIONS 1-9: 11
5. POOR APPETITE OR OVEREATING: NEARLY EVERY DAY

## 2025-03-04 NOTE — PATIENT INSTRUCTIONS
PHONE CALLS TO MAKE  1) Please call [phone] to schedule follow up with psychiatry Dr Peralta as soon as possible     SOCIAL WORK ASSISTANCE (FINANCIAL, FOOD)  1) Call back  regarding [place] Financial Assistance Program (see below for full message and contact information)  -- Call back  regarding psychotherapy and mental health resources   Keren Tolbert, DANA, LICSW, OSW-C  Clinical - Adult Oncology  Phone: [phone]     PROCEDURE 3/7  -- EGD is scheduled for 3/7, please follow instructions sent to you via [employer] prior to procedure    -- Please get your hepatitis B and tetanus vaccines at your [address] [facility] pharmacy before May 2025    I will see you again in two months for follow up, Janet. It is always a pleasure to see you and I am glad you are getting out to enjoy the warm weather - continue to do so!     Dr. Frazier

## 2025-03-05 ENCOUNTER — MYC MEDICAL ADVICE (OUTPATIENT)
Dept: PHARMACY | Facility: CLINIC | Age: 50
End: 2025-03-05
Payer: MEDICARE

## 2025-03-05 NOTE — ORAL ONC MGMT
Oral Chemotherapy Monitoring Program  Lab Follow Up    Reviewed lab results from 3/4/25.        12/6/2024     4:00 PM 12/24/2024     8:00 AM 1/2/2025    10:00 AM 1/2/2025     2:00 PM 1/20/2025     1:00 PM 2/28/2025    11:00 AM 3/5/2025     8:00 AM   ORAL CHEMOTHERAPY   Assessment Type Refill Lab Monitoring Refill Left Voicemail Lab Monitoring Refill Lab Monitoring   Diagnosis Code Breast Cancer Breast Cancer Breast Cancer Breast Cancer Breast Cancer Breast Cancer Breast Cancer   Providers Dr. Dimitrios Plunkett   [facility]   Is this patient followed by the [facility] OC team? No No No No No     Drug Name Verzenio (abemaciclib) Verzenio (abemaciclib) Verzenio (abemaciclib) Verzenio (abemaciclib) Verzenio (abemaciclib) Verzenio (abemaciclib) Verzenio (abemaciclib)   Dose 100 mg 100 mg 100 mg 100 mg 100 mg 100 mg 100 mg   Current Schedule BID BID BID BID BID BID BID   Cycle Details Continuous Continuous Continuous Continuous Continuous Continuous Continuous   Is the dose as ordered appropriate for the patient?   Yes           Labs:  _  Result Component Current Result Ref Range   Sodium 143 (3/4/2025) 135 - 145 mmol/L     _  Result Component Current Result Ref Range   Potassium 3.4 (3/4/2025) 3.4 - 5.3 mmol/L     _  Result Component Current Result Ref Range   Calcium 9.1 (3/4/2025) 8.8 - 10.4 mg/dL     No results found for Mag within last 30 days.     No results found for Phos within last 30 days.     _  Result Component Current Result Ref Range   Albumin 4.1 (3/4/2025) 3.5 - 5.2 g/dL     _  Result Component Current Result Ref Range   Urea Nitrogen 12.7 (3/4/2025) 6.0 - 20.0 mg/dL     _  Result Component Current Result Ref Range   Creatinine 1.09 (H) (3/4/2025) 0.51 - 0.95 mg/dL     _  Result Component Current Result Ref Range   AST 15 (3/4/2025) 0 - 45 U/L     _  Result Component Current  Result Ref Range   ALT 13 (3/4/2025) 0 - 50 U/L     _  Result Component Current Result Ref Range   Bilirubin Total 0.2 (3/4/2025) <=1.2 mg/dL     _  Result Component Current Result Ref Range   WBC Count 3.4 (L) (3/4/2025) 4.0 - 11.0 10e3/uL     _  Result Component Current Result Ref Range   Hemoglobin 10.6 (L) (3/4/2025) 11.7 - 15.7 g/dL     _  Result Component Current Result Ref Range   Platelet Count 163 (3/4/2025) 150 - 450 10e3/uL     No results found for ANC within last 30 days.     _  Result Component Current Result Ref Range   Absolute Neutrophils 1.8 (3/4/2025) 1.6 - 8.3 10e3/uL        Assessment & Plan:  No concerning abnormalities.    MyChart to patient with results.    Follow-Up:  3/9 Zometa infusion, cancel labs prior - message sent to scheduling.    Nadya Broderick, PharmD, BCPS  Oral Chemotherapy Monitoring Program  [facility]  [phone]

## 2025-03-06 NOTE — H&P
Teresa Sanderson  [id number]  female  49 year old    Reason for procedure/surgery: 49F with history of breast cancer with bone metastases who presents with diffuse FDG uptake in stomach on PET.    Patient Active Problem List   Diagnosis    Breast mass    Spine metastasis    Urinary tract infection with hematuria    Malignant neoplasm of overlapping sites of left breast in female, estrogen receptor positive (H)    Carcinoma of left breast metastatic to bone (H)    Metastasis to bone (H)    Pain of right lower leg    Malignant neoplasm of female breast, unspecified estrogen receptor status, unspecified laterality, unspecified site of breast (H)    Schizoaffective disorder, bipolar type (H)    Insomnia, unspecified type    Cancer associated pain    Malignant neoplasm metastatic to bone (H)       Past Surgical History:    Past Surgical History:   Procedure Laterality Date    COLONOSCOPY N/A 7/8/2022    Procedure: COLONOSCOPY, WITH POLYPECTOMY;  Surgeon: Ham Cano MD;  Location: [facility] OR    IR LUMBAR KYPHOPLASTY VERTEBRAE  5/17/2021    LAPAROSCOPIC SALPINGO-OOPHORECTOMY Bilateral 8/20/2021    Procedure: BILATERAL SALPINGO-OOPHORECTOMY, LAPAROSCOPIC;  Surgeon: Rory Lopez MD;  Location: [facility] OR    TUBAL LIGATION  1998       Past Medical History:   Past Medical History:   Diagnosis Date    Anxiety     Breast CA (H) 12/2020    Depression     DVT (deep venous thrombosis) (H) 2014    Left breast mass     x approximately 4-5 months    Pulmonary emboli (H)     Pyelonephritis     Schizoaffective disorder (H)     Tobacco use        Social History:   Social History     Tobacco Use    Smoking status: Some Days     Current packs/day: 0.25     Average packs/day: 0.3 packs/day for 13.8 years (3.5 ttl pk-yrs)     Types: Cigarettes     Start date: 5/13/2011     Passive exposure: Current    Smokeless tobacco: Never    Tobacco comments:     4-5 per day   Substance Use Topics    Alcohol use: Not Currently     Comment:  occ       Family History:   Family History   Problem Relation Age of Onset    Lung Cancer Mother     Lung Cancer Father     Lupus Brother     Kidney Disease Brother     Diabetes Daughter     Asthma Son     Cardiovascular Maternal Aunt     Hypertension Other     Diabetes Other     Deep Vein Thrombosis (DVT) No family hx of        Allergies:   Allergies   Allergen Reactions    Contrast Dye      Pt developed nausea after isovue 370 injection on 6/9/21        Active Medications:   Current Outpatient Medications   Medication Sig Dispense Refill    abemaciclib (VERZENIO) 100 MG tablet Take 1 tablet (100 mg) by mouth 2 times daily for 28 days 56 tablet 0    famotidine (PEPCID) 20 MG tablet Take 1 tablet (20 mg) by mouth 2 times daily. 60 tablet 3    gabapentin (NEURONTIN) 300 MG capsule Take 3 capsules (900 mg) by mouth 3 times daily. 270 capsule 1    hydrOXYzine HCl (ATARAX) 25 MG tablet Take 1 tablet (25 mg) by mouth 4 times daily as needed for anxiety or other (panic). 120 tablet 1    methocarbamol (ROBAXIN) 500 MG tablet Take 2-3 tablets (1,000-1,500 mg) by mouth 3 times daily as needed for muscle spasms. 210 tablet 2    methylPREDNISolone (MEDROL) 32 MG tablet 12 hours prior to scan and 2 hours prior to scan (Patient not taking: Reported on 2/18/2025) 2 tablet 3    naloxone (NARCAN) 4 MG/0.1ML nasal spray Spray 1 spray (4 mg) into one nostril alternating nostrils as needed for opioid reversal. every 2-3 minutes until assistance arrives 2 each 0    OLANZapine (ZYPREXA) 5 MG tablet Take 1-2 tablets (5-10 mg) by mouth nightly as needed for sleep. Take 1/2 tablet (2.5mg) daily as needed for hypomania/ tom. 30 tablet 1    oxyCODONE (ROXICODONE) 5 MG tablet Take 1-2 tablets (5-10 mg) by mouth every 4 hours as needed for pain. 200 tablet 0    QUEtiapine (SEROQUEL) 100 MG tablet Tale 1 tablet (100mg) with 1 tablet (400mg) for total of 500mg by mouth at bedtime 30 tablet 2    QUEtiapine (SEROQUEL) 400 MG tablet Take 1 tablet  (400mg) with 1 tablet (100mg) for total of 500mg by mouth at bedtime 30 tablet 2    QUEtiapine (SEROQUEL) 50 MG tablet Take 0.5-1 tablets (25-50 mg) by mouth 2 times daily as needed (for sleep, mood and anxiety). 60 tablet 2    rivaroxaban ANTICOAGULANT (XARELTO) 20 MG TABS tablet Take 1 tablet (20 mg) by mouth daily (with dinner). 90 tablet 3    Rivaroxaban ANTICOAGULANT 15 & 20 MG TBPK Starter Therapy Pack Take 15 mg by mouth 2 times daily (with meals) for 21 days, THEN 20 mg daily with food for 9 days. 51 each 0    sertraline (ZOLOFT) 100 MG tablet Take 0.5 tablets (50 mg) by mouth daily. 30 tablet 1    Vitamin D3 (CHOLECALCIFEROL) 25 mcg (1000 units) tablet Take 1 tablet (25 mcg) by mouth daily. 90 tablet 3       Systemic Review:   CONSTITUTIONAL: NEGATIVE for fever, chills, change in weight  ENT/MOUTH: NEGATIVE for ear, mouth and throat problems  RESP: NEGATIVE for significant cough or SOB  CV: NEGATIVE for chest pain, palpitations or peripheral edema    Physical Examination:   Vital Signs: There were no vitals taken for this visit.  GENERAL: healthy, alert and no distress  NECK: no adenopathy, no asymmetry, masses, or scars  RESP: lungs clear to auscultation - no rales, rhonchi or wheezes  CV: regular rate and rhythm, normal S1 S2, no S3 or S4, no murmur, click or rub, no peripheral edema and peripheral pulses strong  ABDOMEN: soft, nontender, no hepatosplenomegaly, no masses and bowel sounds normal  MS: no gross musculoskeletal defects noted, no edema    I examined patient s dentition and informed the patient that dental injuries are a risk of any anesthetic management. No concerns were noted with this patient's dentition. . The patient has consented to proceed with the procedure.    Plan: Appropriate to proceed as scheduled.      Nguyen Holliday MD  3/7/2025    PCP:  Lavonne Frazier

## 2025-03-07 ENCOUNTER — HOSPITAL ENCOUNTER (OUTPATIENT)
Facility: AMBULATORY SURGERY CENTER | Age: 50
Discharge: HOME | End: 2025-03-07
Attending: STUDENT IN AN ORGANIZED HEALTH CARE EDUCATION/TRAINING PROGRAM
Payer: MEDICARE

## 2025-03-07 VITALS
BODY MASS INDEX: 32.37 KG/M2 | WEIGHT: 239 LBS | OXYGEN SATURATION: 100 % | TEMPERATURE: 96.2 F | DIASTOLIC BLOOD PRESSURE: 82 MMHG | SYSTOLIC BLOOD PRESSURE: 130 MMHG | HEART RATE: 68 BPM | RESPIRATION RATE: 14 BRPM | HEIGHT: 72 IN

## 2025-03-07 LAB — UPPER GI ENDOSCOPY: NORMAL

## 2025-03-07 PROCEDURE — 88305 TISSUE EXAM BY PATHOLOGIST: CPT | Mod: 26 | Performed by: STUDENT IN AN ORGANIZED HEALTH CARE EDUCATION/TRAINING PROGRAM

## 2025-03-07 PROCEDURE — 88305 TISSUE EXAM BY PATHOLOGIST: CPT | Mod: TC | Performed by: STUDENT IN AN ORGANIZED HEALTH CARE EDUCATION/TRAINING PROGRAM

## 2025-03-07 PROCEDURE — 43239 EGD BIOPSY SINGLE/MULTIPLE: CPT | Performed by: STUDENT IN AN ORGANIZED HEALTH CARE EDUCATION/TRAINING PROGRAM

## 2025-03-07 RX ORDER — ONDANSETRON 2 MG/ML
4 INJECTION INTRAMUSCULAR; INTRAVENOUS EVERY 6 HOURS PRN
Status: DISCONTINUED | OUTPATIENT
Start: 2025-03-07 | End: 2025-03-08 | Stop reason: HOSPADM

## 2025-03-07 RX ORDER — NALOXONE HYDROCHLORIDE 0.4 MG/ML
0.2 INJECTION, SOLUTION INTRAMUSCULAR; INTRAVENOUS; SUBCUTANEOUS
Status: DISCONTINUED | OUTPATIENT
Start: 2025-03-07 | End: 2025-03-08 | Stop reason: HOSPADM

## 2025-03-07 RX ORDER — LIDOCAINE 40 MG/G
CREAM TOPICAL
Status: DISCONTINUED | OUTPATIENT
Start: 2025-03-07 | End: 2025-03-07 | Stop reason: HOSPADM

## 2025-03-07 RX ORDER — ONDANSETRON 4 MG/1
4 TABLET, ORALLY DISINTEGRATING ORAL EVERY 6 HOURS PRN
Status: DISCONTINUED | OUTPATIENT
Start: 2025-03-07 | End: 2025-03-08 | Stop reason: HOSPADM

## 2025-03-07 RX ORDER — PROCHLORPERAZINE MALEATE 10 MG
10 TABLET ORAL EVERY 6 HOURS PRN
Status: DISCONTINUED | OUTPATIENT
Start: 2025-03-07 | End: 2025-03-08 | Stop reason: HOSPADM

## 2025-03-07 RX ORDER — FLUMAZENIL 0.1 MG/ML
0.2 INJECTION, SOLUTION INTRAVENOUS
Status: ACTIVE | OUTPATIENT
Start: 2025-03-07 | End: 2025-03-07

## 2025-03-07 RX ORDER — NALOXONE HYDROCHLORIDE 0.4 MG/ML
0.4 INJECTION, SOLUTION INTRAMUSCULAR; INTRAVENOUS; SUBCUTANEOUS
Status: DISCONTINUED | OUTPATIENT
Start: 2025-03-07 | End: 2025-03-08 | Stop reason: HOSPADM

## 2025-03-07 RX ORDER — ONDANSETRON 2 MG/ML
4 INJECTION INTRAMUSCULAR; INTRAVENOUS
Status: DISCONTINUED | OUTPATIENT
Start: 2025-03-07 | End: 2025-03-07 | Stop reason: HOSPADM

## 2025-03-07 NOTE — DISCHARGE INSTRUCTIONS
Discharge Instructions after  Upper Endoscopy (EGD)    Activity and Diet  You were given medicine for pain. You may be dizzy or sleepy.  For 24 hours:   Do not drive or use heavy equipment.   Do not make important decisions.   Do not drink any alcohol.  ___ You may return to your regular diet.    Discomfort  You may have a sore throat for 2 to 3 days. It may help to:   Avoid hot liquids for 24 hours.   Use sore throat lozenges.   Gargle as needed with salt water up to 4 times a day. Mix 1 cup of warm water  with 1 teaspoon of salt. Do not swallow.  ___ Your esophagus was dilated (opened) or banded during the exam:   Drink only cool liquids for the rest of the day. Eat a soft diet for the next few days.   You may have a sore chest for 2 to 3 days.    You may take Tylenol (acetaminophen) for pain unless your doctor has told you not to.    Do not take aspirin or ibuprofen (Advil, Motrin) or other NSAIDS  (anti-inflammatory drugs) for ___ days.    Follow-up  ___ We took small tissue samples for study. If you do not have a follow-up visit scheduled,  call your provider s office in 2 weeks for the results.    Other instructions________________________________________________________    When to call us:  Problems are rare. Call right away if you have:   Unusual throat pain or trouble swallowing   Unusual pain in belly or chest that is not relieved by belching or passing air   Black stools (tar-like looking bowel movement)   Temperature above 100.6  F. (37.5  C).    If you vomit blood or have severe pain, go to an emergency room.    If you have questions, call:  Monday to Friday, 8 a.m. to 4:30 p.m.: Central Scheduling Department:[phone]    After hours: Hospital: [phone] (Ask for the GI fellow on call)

## 2025-03-08 ENCOUNTER — MYC REFILL (OUTPATIENT)
Dept: PALLIATIVE CARE | Facility: CLINIC | Age: 50
End: 2025-03-08
Payer: MEDICARE

## 2025-03-08 DIAGNOSIS — G89.3 CANCER ASSOCIATED PAIN: ICD-10-CM

## 2025-03-08 DIAGNOSIS — C50.812 MALIGNANT NEOPLASM OF OVERLAPPING SITES OF LEFT BREAST IN FEMALE, ESTROGEN RECEPTOR POSITIVE (H): ICD-10-CM

## 2025-03-08 DIAGNOSIS — Z17.0 MALIGNANT NEOPLASM OF OVERLAPPING SITES OF LEFT BREAST IN FEMALE, ESTROGEN RECEPTOR POSITIVE (H): ICD-10-CM

## 2025-03-09 ENCOUNTER — INFUSION THERAPY VISIT (OUTPATIENT)
Dept: ONCOLOGY | Facility: CLINIC | Age: 50
End: 2025-03-09
Attending: INTERNAL MEDICINE
Payer: MEDICARE

## 2025-03-09 VITALS
DIASTOLIC BLOOD PRESSURE: 95 MMHG | TEMPERATURE: 99.3 F | SYSTOLIC BLOOD PRESSURE: 147 MMHG | RESPIRATION RATE: 16 BRPM | HEART RATE: 69 BPM | OXYGEN SATURATION: 99 %

## 2025-03-09 DIAGNOSIS — C50.912 CARCINOMA OF LEFT BREAST METASTATIC TO BONE (H): Primary | ICD-10-CM

## 2025-03-09 DIAGNOSIS — C79.51 CARCINOMA OF LEFT BREAST METASTATIC TO BONE (H): Primary | ICD-10-CM

## 2025-03-09 DIAGNOSIS — Z17.0 MALIGNANT NEOPLASM OF OVERLAPPING SITES OF LEFT BREAST IN FEMALE, ESTROGEN RECEPTOR POSITIVE (H): ICD-10-CM

## 2025-03-09 DIAGNOSIS — C50.812 MALIGNANT NEOPLASM OF OVERLAPPING SITES OF LEFT BREAST IN FEMALE, ESTROGEN RECEPTOR POSITIVE (H): ICD-10-CM

## 2025-03-09 LAB
ALBUMIN SERPL BCG-MCNC: 4 G/DL (ref 3.5–5.2)
CALCIUM SERPL-MCNC: 9.4 MG/DL (ref 8.8–10.4)
CREAT SERPL-MCNC: 1 MG/DL (ref 0.51–0.95)
EGFRCR SERPLBLD CKD-EPI 2021: 69 ML/MIN/1.73M2

## 2025-03-09 PROCEDURE — 258N000003 HC RX IP 258 OP 636: Performed by: PHYSICIAN ASSISTANT

## 2025-03-09 PROCEDURE — 250N000011 HC RX IP 250 OP 636: Performed by: PHYSICIAN ASSISTANT

## 2025-03-09 PROCEDURE — 82565 ASSAY OF CREATININE: CPT | Performed by: PHYSICIAN ASSISTANT

## 2025-03-09 PROCEDURE — 82040 ASSAY OF SERUM ALBUMIN: CPT | Performed by: PHYSICIAN ASSISTANT

## 2025-03-09 PROCEDURE — 96365 THER/PROPH/DIAG IV INF INIT: CPT

## 2025-03-09 PROCEDURE — 36415 COLL VENOUS BLD VENIPUNCTURE: CPT | Performed by: PHYSICIAN ASSISTANT

## 2025-03-09 PROCEDURE — 82310 ASSAY OF CALCIUM: CPT | Performed by: PHYSICIAN ASSISTANT

## 2025-03-09 RX ORDER — ZOLEDRONIC ACID 0.04 MG/ML
4 INJECTION, SOLUTION INTRAVENOUS ONCE
Status: COMPLETED | OUTPATIENT
Start: 2025-03-09 | End: 2025-03-09

## 2025-03-09 RX ADMIN — SODIUM CHLORIDE 250 ML: 9 INJECTION, SOLUTION INTRAVENOUS at 10:53

## 2025-03-09 RX ADMIN — ZOLEDRONIC ACID 4 MG: 0.04 INJECTION, SOLUTION INTRAVENOUS at 10:53

## 2025-03-09 NOTE — PROGRESS NOTES
Infusion Nursing Note:  Teresa Sanderson presents today for Zometa.    Patient seen by provider today: No   present during visit today: Not Applicable.    Note: Pt presents to infusion today feeling well. Pt denies having any fevers/chills, chest pain, sob, nausea/vomiting, rash, bladder or bowel concerns.    Pt confirms she is taking vitamin D as prescribed.    Intravenous Access:  Peripheral IV placed.    Treatment Conditions:  Lab Results   Component Value Date     03/04/2025    POTASSIUM 3.4 03/04/2025    MAG 1.7 12/05/2024    CR 1.00 (H) 03/09/2025    NANDO 9.4 03/09/2025    BILITOTAL 0.2 03/04/2025    ALBUMIN 4.0 03/09/2025    ALT 13 03/04/2025    AST 15 03/04/2025       Results reviewed, labs MET treatment parameters, ok to proceed with treatment.      Post Infusion Assessment:  Patient tolerated infusion without incident.  Blood return noted pre and post infusion.  Site patent and intact, free from redness, edema or discomfort.  No evidence of extravasations.  Access discontinued per protocol.       Discharge Plan:   Patient declined prescription refills.  Discharge instructions reviewed with: Patient and friend.  Patient and/or family verbalized understanding of discharge instructions and all questions answered.  AVS to patient via [employer].  Patient will return 5/22 for next appointment.   Patient discharged in stable condition accompanied by: self and friend.  Departure Mode: Ambulatory.      Luna Panchal RN

## 2025-03-09 NOTE — PATIENT INSTRUCTIONS
[facility] Triage and after hours / weekends / holidays:  [phone]    Please call the triage or after hours line if you experience a temperature greater than or equal to 100.4, shaking chills, have uncontrolled nausea, vomiting and/or diarrhea, dizziness, shortness of breath, chest pain, bleeding, unexplained bruising, or if you have any other new/concerning symptoms, questions or concerns.      If you are having any concerning symptoms or wish to speak to a provider before your next infusion visit, please call triage to notify them so we can adequately serve you.     If you need a refill on a narcotic prescription or other medication, please call before your infusion appointment.                March 2025 Sunday Monday Tuesday Wednesday Thursday Friday Saturday                                 1       2     3     4    [facility]   9:15 AM   (60 min.)   Lavonne Frazier MD   [facility] [place]    LAB  10:45 AM   (15 min.)    LAB   [facility] [place] 5     6     7    Outpatient Visit   8:00 AM   [facility]    ESOPHAGOGASTRODUODENOSCOPY   9:00 AM   Nguyen Holliday MD   [facility] OR 8       9    ONC INFUSION 1 HR (60 MIN)  10:00 AM   (60 min.)    ONC INFUSION NURSE   [facility] 10     11     12     13     14     15       16     17     18     19     20     21     22       23     24     25    RETURN CCSL   4:05 PM   (40 min.)   Ayanna Tellez DO   [facility] 26     27    LAB PERIPHERAL  10:00 AM   (15 min.)   [facility]   [facility]    RETURN CCSL  10:15 AM   (45 min.)   Alee De Los Santos PA-C   [facility] 28     29       30     31 April 2025 Sunday Monday Tuesday Wednesday Thursday Friday Saturday             1     2     3     4     5       6     7      8     9     10     11     12       13     14     15     16     17     18     19       20     21     22    PE EYE/TH ONCOLOGY   8:30 AM   (30 min.)   UUPET1   [facility] 23     24    LAB PERIPHERAL   2:30 PM   (15 min.)    [facility]   [facility]    RETURN CCSL   2:45 PM   (45 min.)   Alee De Los Santos PA-C   [facility] 25     26       27     28     29     30                                   Recent Results (from the past 24 hours)   Creatinine    Collection Time: 03/09/25 10:21 AM   Result Value Ref Range    Creatinine 1.00 (H) 0.51 - 0.95 mg/dL    GFR Estimate 69 >60 mL/min/1.73m2   Calcium    Collection Time: 03/09/25 10:21 AM   Result Value Ref Range    Calcium 9.4 8.8 - 10.4 mg/dL   Albumin level    Collection Time: 03/09/25 10:21 AM   Result Value Ref Range    Albumin 4.0 3.5 - 5.2 g/dL      HOME CARE FOLLOWING LUMBAR PUNCTURE    ACTIVITY: Relax and take it easy for 24 hours. No heavy lifting for 24 hours.  Resume regular activity after that.    BATHING: Do not take a bath, sit in a hot tub, spa or swim until the site has healed.  You may shower after 24 hours.     DIET: Resume your regular diet and drink plenty of fluids.     HEADACHE: If you get a headache, lying flat and drinking plenty of fluids may help relieve it. Taking an over-the-counter pain reliever (i.e. Tylenol or Ibuprofen) may also help.    BRUISING:  It is possible to break a small blood vessel near the skin during the procedure which may cause local bruising.  This is not a serious condition and will resolve like any other bruises.    BACK PAIN:  The muscles in your lower back may feel sore after the procedure.  A heating pad or hot water bottle may help relieve the pain.    DRESSING: Keep the dressing dry and in place for 24 hours, unless instructed otherwise.  After 24 hours you can remove the bandage.        Call the doctor for the following:  -A severe  headache or a headache that lasts 2 or more days  -Pain in your back that persists  -Tingling in your groin or legs  -Temperature greater than 100.4  -A stiff neck  -Sever nausea or vomiting    Numbers to call:  -  Monday through Friday 8:30AM-4:00PM call the [facility] Triage Nurses at [phone].  -After hours, weekends, or holidays call the main hospital  at [phone] and ask to speak to the adult hematology/oncology doctor on call.  -Emergency Room: [phone]

## 2025-03-10 RX ORDER — OXYCODONE HYDROCHLORIDE 5 MG/1
5-10 TABLET ORAL EVERY 4 HOURS PRN
Qty: 200 TABLET | Refills: 0 | Status: SHIPPED | OUTPATIENT
Start: 2025-03-10

## 2025-03-10 NOTE — TELEPHONE ENCOUNTER
Received [employer] message from patient requesting refill of oxycodone.     Last refill: 2/18/25  Last office visit: 1/21/25  Scheduled for follow up 3/25/25     Will route request to MD/ for review.     Reviewed [place]  Report.

## 2025-03-11 ENCOUNTER — PATIENT OUTREACH (OUTPATIENT)
Dept: ONCOLOGY | Facility: CLINIC | Age: 50
End: 2025-03-11
Payer: MEDICARE

## 2025-03-11 DIAGNOSIS — B96.81 HELICOBACTER PYLORI GASTRITIS: Primary | ICD-10-CM

## 2025-03-11 DIAGNOSIS — K29.70 HELICOBACTER PYLORI GASTRITIS: Primary | ICD-10-CM

## 2025-03-11 RX ORDER — TETRACYCLINE HYDROCHLORIDE 500 MG/1
500 CAPSULE ORAL 4 TIMES DAILY
Qty: 56 CAPSULE | Refills: 0 | Status: SHIPPED | OUTPATIENT
Start: 2025-03-11 | End: 2025-03-25

## 2025-03-11 RX ORDER — METRONIDAZOLE 250 MG/1
250 TABLET ORAL 4 TIMES DAILY
Qty: 56 TABLET | Refills: 0 | Status: SHIPPED | OUTPATIENT
Start: 2025-03-11 | End: 2025-03-25

## 2025-03-11 RX ORDER — BISMUTH SUBSALICYLATE 262 MG/1
1 TABLET, CHEWABLE ORAL
Qty: 56 TABLET | Refills: 0 | Status: SHIPPED | OUTPATIENT
Start: 2025-03-11 | End: 2025-03-25

## 2025-03-11 NOTE — PROGRESS NOTES
[facility]: Cancer Care Follow-Up Note                                    Discussion with Patient:                                                      Called pt to discuss the results of GI appt      Medication understanding/side effect:   1. Omeprazole 20 mg twice per day   2. Bismuth subsalicylate 262 mg 4 times daily   3. Tetracycline 500 mg 4 times daily   4. Metronidazole 250 mg 4 times daily       Dates of Treatment:                                                      Infusion given in last 28 days       Administered MAR Action Medication Dose Rate Visit    02/27/2025 13:10 Given fulvestrant (FASLODEX) injection 500 mg 500 mg   Oncology Visit on 02/27/2025 in [facility]          Treatment Plan Medications       OP ONC Breast Cancer - Abemaciclib / Fulvestrant       Take Home Medications       Medication Sig Start/End Day/Cycle Status    abemaciclib (VERZENIO) 100 MG tablet Take 1 tablet (100 mg) by mouth 2 times daily for 28 days 2/28/2025 to 3/28/2025 Day 1, Cycle 16 - 2/27/2025 Released                            Assessment:                                                      Was able to go through most of the information and instructions but patient got another call while we were on the phone and needed to tend to the other call. Will call her today, this morning to go over things and answer any questions she may have. Also sent the information to her through a zeenworld message. Pt ok with discussing further today.    Intervention/Education provided during outreach:                                                       Went over information initially and sent the info thru [employer] so she has it in writing. Will call today to discuss further.     Patient to follow up as scheduled at next appt  Patient to call/[employer] message with updates  Results Notification: Results dated 3/11/25 discussed wtih patient over telephone  Confirmed patient has clinic and triage  numbers    Signature:  IDLAMIS Mcleod

## 2025-03-12 ENCOUNTER — PATIENT OUTREACH (OUTPATIENT)
Dept: ONCOLOGY | Facility: CLINIC | Age: 50
End: 2025-03-12
Payer: MEDICARE

## 2025-03-12 NOTE — PROGRESS NOTES
[facility]: Cancer Care                                                                                          Called pt to check in to make sure she understood the instructions from the GI clinic for the H Pylori infection.   Pt verbalized understanding, confirmed that the meds were at the pharmacy she wanted and they are.   Pt verbalized she does not have any questions and understands the instructions. We discussed that she will let us know what day she starts the treatment via upad message and then we can schedule the follow-up stool study  one month after completing her treatment. Will keep track of the timing and contact her in a few days if she doesn't let us know to confirm she has started the treatment. '  No other questions today    Signature:  Myriam Ramirez, RNCC

## 2025-03-13 ENCOUNTER — MYC MEDICAL ADVICE (OUTPATIENT)
Dept: INTERNAL MEDICINE | Facility: CLINIC | Age: 50
End: 2025-03-13
Payer: MEDICARE

## 2025-03-25 ENCOUNTER — TELEPHONE (OUTPATIENT)
Dept: PALLIATIVE CARE | Facility: CLINIC | Age: 50
End: 2025-03-25
Payer: MEDICARE

## 2025-03-25 NOTE — TELEPHONE ENCOUNTER
Patient needs to be rescheduled for their virtual visit due to Reason for Reschedule: No-Show    Scheduling team, please refer to service line late cancellation/no-show policies and reach out to patient at a later date for rescheduling.    Appointment mode: Video  Provider: Dr. Tellez

## 2025-03-27 ENCOUNTER — LAB (OUTPATIENT)
Dept: LAB | Facility: CLINIC | Age: 50
End: 2025-03-27
Attending: INTERNAL MEDICINE
Payer: MEDICARE

## 2025-03-27 ENCOUNTER — ONCOLOGY VISIT (OUTPATIENT)
Dept: ONCOLOGY | Facility: CLINIC | Age: 50
End: 2025-03-27
Attending: PHYSICIAN ASSISTANT
Payer: MEDICARE

## 2025-03-27 VITALS
SYSTOLIC BLOOD PRESSURE: 122 MMHG | BODY MASS INDEX: 32.82 KG/M2 | DIASTOLIC BLOOD PRESSURE: 75 MMHG | RESPIRATION RATE: 16 BRPM | WEIGHT: 242 LBS | OXYGEN SATURATION: 98 % | TEMPERATURE: 98.2 F | HEART RATE: 97 BPM

## 2025-03-27 VITALS
TEMPERATURE: 98.2 F | SYSTOLIC BLOOD PRESSURE: 122 MMHG | DIASTOLIC BLOOD PRESSURE: 75 MMHG | BODY MASS INDEX: 32.82 KG/M2 | HEART RATE: 97 BPM | RESPIRATION RATE: 16 BRPM | OXYGEN SATURATION: 98 % | WEIGHT: 242 LBS

## 2025-03-27 DIAGNOSIS — Z17.0 MALIGNANT NEOPLASM OF OVERLAPPING SITES OF LEFT BREAST IN FEMALE, ESTROGEN RECEPTOR POSITIVE (H): Primary | ICD-10-CM

## 2025-03-27 DIAGNOSIS — C79.51 CARCINOMA OF LEFT BREAST METASTATIC TO BONE (H): Primary | ICD-10-CM

## 2025-03-27 DIAGNOSIS — C50.812 MALIGNANT NEOPLASM OF OVERLAPPING SITES OF LEFT BREAST IN FEMALE, ESTROGEN RECEPTOR POSITIVE (H): ICD-10-CM

## 2025-03-27 DIAGNOSIS — C79.51 CARCINOMA OF LEFT BREAST METASTATIC TO BONE (H): ICD-10-CM

## 2025-03-27 DIAGNOSIS — C50.912 CARCINOMA OF LEFT BREAST METASTATIC TO BONE (H): Primary | ICD-10-CM

## 2025-03-27 DIAGNOSIS — Z17.0 MALIGNANT NEOPLASM OF OVERLAPPING SITES OF LEFT BREAST IN FEMALE, ESTROGEN RECEPTOR POSITIVE (H): ICD-10-CM

## 2025-03-27 DIAGNOSIS — C50.812 MALIGNANT NEOPLASM OF OVERLAPPING SITES OF LEFT BREAST IN FEMALE, ESTROGEN RECEPTOR POSITIVE (H): Primary | ICD-10-CM

## 2025-03-27 DIAGNOSIS — C50.912 CARCINOMA OF LEFT BREAST METASTATIC TO BONE (H): ICD-10-CM

## 2025-03-27 DIAGNOSIS — M54.9 BACK PAIN WITH HISTORY OF SPINAL SURGERY: ICD-10-CM

## 2025-03-27 DIAGNOSIS — G89.3 CANCER ASSOCIATED PAIN: ICD-10-CM

## 2025-03-27 DIAGNOSIS — M62.838 MUSCLE SPASM: ICD-10-CM

## 2025-03-27 DIAGNOSIS — C50.919 BREAST CANCER (H): ICD-10-CM

## 2025-03-27 DIAGNOSIS — Z98.890 BACK PAIN WITH HISTORY OF SPINAL SURGERY: ICD-10-CM

## 2025-03-27 LAB
ALBUMIN SERPL BCG-MCNC: 3.8 G/DL (ref 3.5–5.2)
ALP SERPL-CCNC: 96 U/L (ref 40–150)
ALT SERPL W P-5'-P-CCNC: 11 U/L (ref 0–50)
ANION GAP SERPL CALCULATED.3IONS-SCNC: 11 MMOL/L (ref 7–15)
AST SERPL W P-5'-P-CCNC: 22 U/L (ref 0–45)
BASOPHILS # BLD AUTO: 0.1 10E3/UL (ref 0–0.2)
BASOPHILS NFR BLD AUTO: 2 %
BILIRUB SERPL-MCNC: 0.2 MG/DL
BUN SERPL-MCNC: 11.5 MG/DL (ref 6–20)
CALCIUM SERPL-MCNC: 9.3 MG/DL (ref 8.8–10.4)
CANCER AG15-3 SERPL-ACNC: 64 U/ML
CEA SERPL-MCNC: 4.8 NG/ML
CHLORIDE SERPL-SCNC: 106 MMOL/L (ref 98–107)
CREAT SERPL-MCNC: 1.08 MG/DL (ref 0.51–0.95)
EGFRCR SERPLBLD CKD-EPI 2021: 63 ML/MIN/1.73M2
EOSINOPHIL # BLD AUTO: 0.1 10E3/UL (ref 0–0.7)
EOSINOPHIL NFR BLD AUTO: 4 %
ERYTHROCYTE [DISTWIDTH] IN BLOOD BY AUTOMATED COUNT: 13.7 % (ref 10–15)
GLUCOSE SERPL-MCNC: 116 MG/DL (ref 70–99)
HCO3 SERPL-SCNC: 23 MMOL/L (ref 22–29)
HCT VFR BLD AUTO: 32.5 % (ref 35–47)
HGB BLD-MCNC: 10.6 G/DL (ref 11.7–15.7)
IMM GRANULOCYTES # BLD: 0 10E3/UL
IMM GRANULOCYTES NFR BLD: 0 %
LYMPHOCYTES # BLD AUTO: 1.1 10E3/UL (ref 0.8–5.3)
LYMPHOCYTES NFR BLD AUTO: 35 %
MCH RBC QN AUTO: 33.4 PG (ref 26.5–33)
MCHC RBC AUTO-ENTMCNC: 32.6 G/DL (ref 31.5–36.5)
MCV RBC AUTO: 103 FL (ref 78–100)
MONOCYTES # BLD AUTO: 0.4 10E3/UL (ref 0–1.3)
MONOCYTES NFR BLD AUTO: 11 %
NEUTROPHILS # BLD AUTO: 1.5 10E3/UL (ref 1.6–8.3)
NEUTROPHILS NFR BLD AUTO: 48 %
NRBC # BLD AUTO: 0 10E3/UL
NRBC BLD AUTO-RTO: 0 /100
PLATELET # BLD AUTO: 153 10E3/UL (ref 150–450)
POTASSIUM SERPL-SCNC: 4.1 MMOL/L (ref 3.4–5.3)
PROT SERPL-MCNC: 7.1 G/DL (ref 6.4–8.3)
RBC # BLD AUTO: 3.17 10E6/UL (ref 3.8–5.2)
SODIUM SERPL-SCNC: 140 MMOL/L (ref 135–145)
WBC # BLD AUTO: 3.2 10E3/UL (ref 4–11)

## 2025-03-27 PROCEDURE — 86300 IMMUNOASSAY TUMOR CA 15-3: CPT

## 2025-03-27 PROCEDURE — G0463 HOSPITAL OUTPT CLINIC VISIT: HCPCS | Performed by: PHYSICIAN ASSISTANT

## 2025-03-27 PROCEDURE — 82378 CARCINOEMBRYONIC ANTIGEN: CPT

## 2025-03-27 PROCEDURE — 85004 AUTOMATED DIFF WBC COUNT: CPT

## 2025-03-27 PROCEDURE — 85018 HEMOGLOBIN: CPT

## 2025-03-27 PROCEDURE — 250N000011 HC RX IP 250 OP 636: Mod: JZ | Performed by: INTERNAL MEDICINE

## 2025-03-27 PROCEDURE — 36415 COLL VENOUS BLD VENIPUNCTURE: CPT

## 2025-03-27 PROCEDURE — 82040 ASSAY OF SERUM ALBUMIN: CPT

## 2025-03-27 RX ORDER — METHOCARBAMOL 500 MG/1
TABLET, FILM COATED ORAL 3 TIMES DAILY PRN
Qty: 210 TABLET | Refills: 2 | Status: SHIPPED | OUTPATIENT
Start: 2025-03-27

## 2025-03-27 RX ORDER — LAMOTRIGINE 25 MG/1
500 TABLET ORAL ONCE
Status: COMPLETED | OUTPATIENT
Start: 2025-03-27 | End: 2025-03-27

## 2025-03-27 RX ADMIN — FULVESTRANT 500 MG: 50 INJECTION INTRAMUSCULAR at 11:15

## 2025-03-27 ASSESSMENT — PAIN SCALES - GENERAL: PAINLEVEL_OUTOF10: MODERATE PAIN (5)

## 2025-03-27 NOTE — PROGRESS NOTES
Oncology Visit:   Date on this visit: Mar 27, 2025    Diagnosis:  ER positive left breast cancer metastasized to bones.     Primary Physician: No Ref-Primary, Physician     History Of Present Illness:    Ms. Sanderson is a 49 year old female with a h/o tobacco abuse and DVTs with left breast cancer metastasized to bone. She presented to [facility] ED with back pain on 12/5/2020. MRI of the L-spine showed an abnormal L4 lesion with associated right paraspinal mass, abnormalities in L5 and the left iliac bone were also seen. CT C/A/P showed a left breast mass, lytic lesions of T7, L4, and the pelvis, and a 3 cm lesion in the kidney (thought to be a cyst). Ultrasound of the bilateral lower extremities showed a non-occlusive thrombus in the left popliteal vein. Mammogram and ultrasound of the bilateral breasts on 12/17/2020 showed a spiculated mass measuring at least 7.8 cm at 12-1:00 left breast extending from the nipple to 9 cm from the nipple with associated nipple retraction. This mass was biopsied, and showed IDC with surrounding DCIS, grade 3, ER+ 90%, and NM+ 75%.  HER2 was equivocal in approximately 35% of tumor cells by FISH and was negative by IHC.    Metastatic Breast Cancer Treatment:  12/23/2020 - 1/7/2021  Radiation (3000 cGy) to the lumbar spine.  1/29/2021 - 04/2023  Ibrance, zoladex, and anastrozole.  5/17/2021 radiofrequency ablation, kyphoplasty to L4  8/20/2021  Bilateral salpingo-oophorectomies, Ibrance and anastrozole.  She was off anastrozole 12/2021 - 2/2022 and off Ibrance 01/2022 - 02/2022 due to stress and impending homelessness. Off both again 03/2022-04/2022 due to death of her son but restarted in 05/2022.  04/2023  PET/CT with progression in 2 small metastases in the left pelvis.  Palbociclib continued.  Anastrozole changed to exemestane  5/5/2023  Completed radiation, 2000 cGy in 5 fractions, to the left ilium.  12/11/2023 PET/CT with new left breast lesion, new T7 vertebral metastasis and  progression of hypermetabolic foci in the bony pelvis c/w disease progression.  Ibrance and exemestane discontinued.  12/19/2023 Left breast biopsy  [facility] NGS:   ER positive, 3+  100%   KS positive, 1+, 5%   HER2 negative.   AR positive, 1+, 35%   MMR proficient   PD-L1 negative, CPS 0   PTEN positive, 1+ 100%  *Of note, all of the above was IHC, DNA quantity not sufficient for NGS  1/18/24 - present  Fulvestrant + abemaciclib.  Abemaciclib dose reduced to 100 mg PO BID due to hand foot syndrome.  7/18/2024  completed radiation to the left acetabulum and the SI    Interval History: Teresa has been feeling okay. She has been compliant taking verzenio twice daily. She has family calling her in the morning to make sure she does not miss a dose. She had a hard last few days emotionally as Monday was the anniversary of her son's death. She was able to get out of the house yesterday for a few hours and this felt good. She needs to follow-up with psychiatry. Generally has good and bad days. Overall she has been more active and getting out more. She is taking care of her grandkids a couple of mornings a week.     She has a few days left of treatment for H pylori. Was not having many upper abdominal symptoms at baseline besides rare reflux. No change to this. Had some looser stools on antibiotics.     No fevers/chills or URI symptoms recently. Has stable low back pain. L leg symptoms are the same. No new or different pain. No current HFS.       Past Medical/Surgical History:   Past Medical History:   Diagnosis Date    Anxiety     Breast CA (H) 12/2020    Depression     DVT (deep venous thrombosis) (H) 2014    Left breast mass     x approximately 4-5 months    Pulmonary emboli (H)     Pyelonephritis     Schizoaffective disorder (H)     Tobacco use      Past Surgical History:   Procedure Laterality Date    COLONOSCOPY N/A 7/8/2022    Procedure: COLONOSCOPY, WITH POLYPECTOMY;  Surgeon: Ham Cano MD;  Location: [facility] OR     ESOPHAGOSCOPY, GASTROSCOPY, DUODENOSCOPY (EGD), COMBINED N/A 3/7/2025    Procedure: ESOPHAGOGASTRODUODENOSCOPY, WITH BIOPSY;  Surgeon: Nguyen Holliday MD;  Location: [facility] OR    IR LUMBAR KYPHOPLASTY VERTEBRAE  5/17/2021    LAPAROSCOPIC SALPINGO-OOPHORECTOMY Bilateral 8/20/2021    Procedure: BILATERAL SALPINGO-OOPHORECTOMY, LAPAROSCOPIC;  Surgeon: Rory Lopez MD;  Location: [facility] OR    TUBAL LIGATION  1998        Allergies   Allergen Reactions    Contrast Dye      Pt developed nausea after isovue 370 injection on 6/9/21        Current Outpatient Medications   Medication Sig Dispense Refill    abemaciclib (VERZENIO) 100 MG tablet Take 1 tablet (100 mg) by mouth 2 times daily for 28 days 56 tablet 0    gabapentin (NEURONTIN) 300 MG capsule Take 3 capsules (900 mg) by mouth 3 times daily. 270 capsule 1    hydrOXYzine HCl (ATARAX) 25 MG tablet Take 1 tablet (25 mg) by mouth 4 times daily as needed for anxiety or other (panic). 120 tablet 1    methocarbamol (ROBAXIN) 500 MG tablet Take 2-3 tablets (1,000-1,500 mg) by mouth 3 times daily as needed for muscle spasms. 210 tablet 2    methylPREDNISolone (MEDROL) 32 MG tablet 12 hours prior to scan and 2 hours prior to scan (Patient taking differently: as needed. 12 hours prior to scan and 2 hours prior to scan) 2 tablet 3    naloxone (NARCAN) 4 MG/0.1ML nasal spray Spray 1 spray (4 mg) into one nostril alternating nostrils as needed for opioid reversal. every 2-3 minutes until assistance arrives 2 each 0    OLANZapine (ZYPREXA) 5 MG tablet Take 1-2 tablets (5-10 mg) by mouth nightly as needed for sleep. Take 1/2 tablet (2.5mg) daily as needed for hypomania/ tom. (Patient taking differently: at bedtime. Take 1-2 tablets (5-10 mg) by mouth nightly as needed for sleep. Take 1/2 tablet (2.5mg) daily as needed for hypomania/ tom.) 30 tablet 1    omeprazole (PRILOSEC) 40 MG DR capsule Take 1 capsule (40 mg) by mouth 2 times daily. 112 capsule 0    oxyCODONE  (ROXICODONE) 5 MG tablet Take 1-2 tablets (5-10 mg) by mouth every 4 hours as needed for pain. 200 tablet 0    QUEtiapine (SEROQUEL) 100 MG tablet Tale 1 tablet (100mg) with 1 tablet (400mg) for total of 500mg by mouth at bedtime 30 tablet 2    QUEtiapine (SEROQUEL) 400 MG tablet Take 1 tablet (400mg) with 1 tablet (100mg) for total of 500mg by mouth at bedtime 30 tablet 2    QUEtiapine (SEROQUEL) 50 MG tablet Take 0.5-1 tablets (25-50 mg) by mouth 2 times daily as needed (for sleep, mood and anxiety). 60 tablet 2    rivaroxaban ANTICOAGULANT (XARELTO) 20 MG TABS tablet Take 1 tablet (20 mg) by mouth daily (with dinner). 90 tablet 3    sertraline (ZOLOFT) 100 MG tablet Take 0.5 tablets (50 mg) by mouth daily. 30 tablet 1    Vitamin D3 (CHOLECALCIFEROL) 25 mcg (1000 units) tablet Take 1 tablet (25 mcg) by mouth daily. 90 tablet 3    Rivaroxaban ANTICOAGULANT 15 & 20 MG TBPK Starter Therapy Pack Take 15 mg by mouth 2 times daily (with meals) for 21 days, THEN 20 mg daily with food for 9 days. 51 each 0     No current facility-administered medications for this visit.   '    Physical exam:  /75   Pulse 97   Temp 98.2  F (36.8  C)   Resp 16   Wt 109.8 kg (242 lb)   SpO2 98%   BMI 32.82 kg/m    General:  Well appearing adult female in NAD.  Alert and oriented x 3.  HEENT:  Normocephalic.  Sclera anicteric.  MMM.  No lesions of the oropharynx.  Lymph:  No palpable cervical, supraclavicular, or axillary LAD.  Chest:  CTA bilaterally.  No wheezes or crackles.  CV:  RRR.    Abd:  Soft/NT/ND.    Ext:  No pitting edema of the bilateral lower extremities.    Musculo:  Moves all 4 extremities appropriately.  Neuro:  Cranial nerves grossly intact.  No tremors or dyskinetic movements.  Able to climb on the exam table unaided.  Psych:  Mood and affect appear normal.  Skin:  No visible concerning skin rashes or lesions    Laboratory/Imaging Studies:   I personally reviewed the below laboratories and images while in clinic  today:   03/27/25 10:33   Sodium 140   Potassium 4.1   Chloride 106   Carbon Dioxide (CO2) 23   Urea Nitrogen 11.5   Creatinine 1.08 (H)   GFR Estimate 63   Calcium 9.3   Anion Gap 11   Albumin 3.8   Protein Total 7.1   Alkaline Phosphatase 96   ALT 11   AST 22   Bilirubin Total 0.2   Glucose 116 (H)   WBC 3.2 (L)   Hemoglobin 10.6 (L)   Hematocrit 32.5 (L)   Platelet Count 153   RBC Count 3.17 (L)    (H)   MCH 33.4 (H)   MCHC 32.6   RDW 13.7   % Neutrophils 48   % Lymphocytes 35   % Monocytes 11   % Eosinophils 4   % Basophils 2   Absolute Basophils 0.1   Absolute Eosinophils 0.1   Absolute Immature Granulocytes 0.0   Absolute Lymphocytes 1.1   Absolute Monocytes 0.4   % Immature Granulocytes 0   Absolute Neutrophils 1.5 (L)   Absolute NRBCs 0.0   NRBCs per 100 WBC 0      12/23/24 15:05 01/20/25 12:22 03/04/25 11:11   Cancer Antigen 15-3 50 (H) 48 (H) 57 (H)   CEA 4.2 3.7 4.3       ASSESSMENT/PLAN:   49 year old female with history of DVT and ER positive left breast cancer metastasized to bones.    1.  Metastatic breast cancer: On treatment with abemaciclib and fulvestrant from 01/2024 - 02/2025.  PET/CT on 2/3/2024 with progression of bone metastases (left sacroiliac, T7 vertebrae, right 6th rib, right femoral head).     - She was not compliant with taking her treatment at the time of the PET/CT.  She has been more compliant with treatment. Short term follow-up PET scheduled for next month.   - She is s/p left iliac bone biopsy on 2/18/2025.  We reviewed that the bone biopsy confirmed metastatic breast cancer, ER positive.  This was sent for [facility] NGS on 2/26/2025.  I received a message after our appointment that NGS is unable to be performed on the bone biopsy due to decalcified sample making the DNA quality insufficient. Thus Guardant 360 CDx liquid biopsy for NGS was done and is still in process. Of note, ESR1, PIK3CA, Akt, and PTEN status will help guide next line treatment options.  -Abemaciclib is  currently dosed at 100 mg PO BID secondary to hand foot syndrome.  Symptoms improved with the dose reduction.  Will continue this dose.  - Fulvestrant was given after visit today.     2.  Bone metastases/low back pain with LLE sciatica:  She is s/p XRT to the left ilium as well as the lumbar spine and kyphoplasty of L4--with some extravasation of cement which procedularist is aware of and recommended continued AC indefinitely.   - received radiation to the left acetabulum and the SI, completed on 7/18/2024.  - Most recent PET/CT with disease progression left SI joint.  She has associated pain. This is stable. Due to prior radiation, not a candidate for further radiation here.  - She is currently taking oxycodone, Robaxin, and gabapentin.  Ongoing follow up with palliative medicine.    - Receiving Zometa once every 3 months. Next due early June.     3.  Schizoaffective disorder, bipolar type: Waxing and waning. A few bad days earlier this week with the anniversary of her son's death. She is on treatment with Seroquel and Zoloft. Recommended she follow up with psychiatry.     4.  FDG uptake stomach/dyspepsia: Had EGD done showing esophagitis and non bleeding gastric ulcers. She was positive for H. Pylori. Started treatment for this and has a few days left. Plan for stool antigen test one month after completion of PPI.      5.  FELTON:  Secondary to abemaciclib. Cr is stable. Continue to avoid NSAIDs and encouraged good hydration.     35 minutes spent on the date of the encounter doing chart review, review of test results, interpretation of tests, patient visit, and documentation     The longitudinal plan of care for the diagnosis(es)/condition(s) as documented were addressed during this visit. Due to the added complexity in care, I will continue to support Teresa in the subsequent management and with ongoing continuity of care.    Alee De Los Santos PA-C

## 2025-03-27 NOTE — LETTER
3/27/2025      Teresa Sanderson  [address]  [facility] [place] [address]      Dear Colleague,    Thank you for referring your patient, Teresa Sanderson, to the [facility]. Please see a copy of my visit note below.    Oncology Visit:   Date on this visit: Mar 27, 2025    Diagnosis:  ER positive left breast cancer metastasized to bones.     Primary Physician: No Ref-Primary, Physician     History Of Present Illness:    Ms. Sanderson is a 49 year old female with a h/o tobacco abuse and DVTs with left breast cancer metastasized to bone. She presented to [facility] ED with back pain on 12/5/2020. MRI of the L-spine showed an abnormal L4 lesion with associated right paraspinal mass, abnormalities in L5 and the left iliac bone were also seen. CT C/A/P showed a left breast mass, lytic lesions of T7, L4, and the pelvis, and a 3 cm lesion in the kidney (thought to be a cyst). Ultrasound of the bilateral lower extremities showed a non-occlusive thrombus in the left popliteal vein. Mammogram and ultrasound of the bilateral breasts on 12/17/2020 showed a spiculated mass measuring at least 7.8 cm at 12-1:00 left breast extending from the nipple to 9 cm from the nipple with associated nipple retraction. This mass was biopsied, and showed IDC with surrounding DCIS, grade 3, ER+ 90%, and [place]+ 75%.  HER2 was equivocal in approximately 35% of tumor cells by FISH and was negative by IHC.    Metastatic Breast Cancer Treatment:  12/23/2020 - 1/7/2021  Radiation (3000 cGy) to the lumbar spine.  1/29/2021 - 04/2023  Ibrance, zoladex, and anastrozole.  5/17/2021 radiofrequency ablation, kyphoplasty to L4  8/20/2021  Bilateral salpingo-oophorectomies, Ibrance and anastrozole.  She was off anastrozole 12/2021 - 2/2022 and off Ibrance 01/2022 - 02/2022 due to stress and impending homelessness. Off both again 03/2022-04/2022 due to death of her son but restarted in 05/2022.  04/2023  PET/CT with  progression in 2 small metastases in the left pelvis.  Palbociclib continued.  Anastrozole changed to exemestane  5/5/2023  Completed radiation, 2000 cGy in 5 fractions, to the left ilium.  12/11/2023 PET/CT with new left breast lesion, new T7 vertebral metastasis and progression of hypermetabolic foci in the bony pelvis c/w disease progression.  Ibrance and exemestane discontinued.  12/19/2023 Left breast biopsy  [facility] NGS:   ER positive, 3+  100%   IL positive, 1+, 5%   HER2 negative.   AR positive, 1+, 35%   MMR proficient   PD-L1 negative, CPS 0   PTEN positive, 1+ 100%  *Of note, all of the above was IHC, DNA quantity not sufficient for NGS  1/18/24 - present  Fulvestrant + abemaciclib.  Abemaciclib dose reduced to 100 mg PO BID due to hand foot syndrome.  7/18/2024  completed radiation to the left acetabulum and the SI    Interval History: Teresa has been feeling okay. She has been compliant taking verzenio twice daily. She has family calling her in the morning to make sure she does not miss a dose. She had a hard last few days emotionally as Monday was the anniversary of her son's death. She was able to get out of the house yesterday for a few hours and this felt good. She needs to follow-up with psychiatry. Generally has good and bad days. Overall she has been more active and getting out more. She is taking care of her grandkids a couple of mornings a week.     She has a few days left of treatment for H pylori. Was not having many upper abdominal symptoms at baseline besides rare reflux. No change to this. Had some looser stools on antibiotics.     No fevers/chills or URI symptoms recently. Has stable low back pain. L leg symptoms are the same. No new or different pain. No current HFS.       Past Medical/Surgical History:   Past Medical History:   Diagnosis Date     Anxiety      Breast CA (H) 12/2020     Depression      DVT (deep venous thrombosis) (H) 2014     Left breast mass     x approximately 4-5  months     Pulmonary emboli (H)      Pyelonephritis      Schizoaffective disorder (H)      Tobacco use      Past Surgical History:   Procedure Laterality Date     COLONOSCOPY N/A 7/8/2022    Procedure: COLONOSCOPY, WITH POLYPECTOMY;  Surgeon: Ham Cano MD;  Location: [facility] OR     ESOPHAGOSCOPY, GASTROSCOPY, DUODENOSCOPY (EGD), COMBINED N/A 3/7/2025    Procedure: ESOPHAGOGASTRODUODENOSCOPY, WITH BIOPSY;  Surgeon: Nguyen Holliday MD;  Location: [facility] OR     IR LUMBAR KYPHOPLASTY VERTEBRAE  5/17/2021     LAPAROSCOPIC SALPINGO-OOPHORECTOMY Bilateral 8/20/2021    Procedure: BILATERAL SALPINGO-OOPHORECTOMY, LAPAROSCOPIC;  Surgeon: Rory Lopez MD;  Location: [facility] OR     TUBAL LIGATION  1998        Allergies   Allergen Reactions     Contrast Dye      Pt developed nausea after isovue 370 injection on 6/9/21        Current Outpatient Medications   Medication Sig Dispense Refill     abemaciclib (VERZENIO) 100 MG tablet Take 1 tablet (100 mg) by mouth 2 times daily for 28 days 56 tablet 0     gabapentin (NEURONTIN) 300 MG capsule Take 3 capsules (900 mg) by mouth 3 times daily. 270 capsule 1     hydrOXYzine HCl (ATARAX) 25 MG tablet Take 1 tablet (25 mg) by mouth 4 times daily as needed for anxiety or other (panic). 120 tablet 1     methocarbamol (ROBAXIN) 500 MG tablet Take 2-3 tablets (1,000-1,500 mg) by mouth 3 times daily as needed for muscle spasms. 210 tablet 2     methylPREDNISolone (MEDROL) 32 MG tablet 12 hours prior to scan and 2 hours prior to scan (Patient taking differently: as needed. 12 hours prior to scan and 2 hours prior to scan) 2 tablet 3     naloxone (NARCAN) 4 MG/0.1ML nasal spray Spray 1 spray (4 mg) into one nostril alternating nostrils as needed for opioid reversal. every 2-3 minutes until assistance arrives 2 each 0     OLANZapine (ZYPREXA) 5 MG tablet Take 1-2 tablets (5-10 mg) by mouth nightly as needed for sleep. Take 1/2 tablet (2.5mg) daily as needed for hypomania/ tom.  (Patient taking differently: at bedtime. Take 1-2 tablets (5-10 mg) by mouth nightly as needed for sleep. Take 1/2 tablet (2.5mg) daily as needed for hypomania/ tom.) 30 tablet 1     omeprazole (PRILOSEC) 40 MG DR capsule Take 1 capsule (40 mg) by mouth 2 times daily. 112 capsule 0     oxyCODONE (ROXICODONE) 5 MG tablet Take 1-2 tablets (5-10 mg) by mouth every 4 hours as needed for pain. 200 tablet 0     QUEtiapine (SEROQUEL) 100 MG tablet Tale 1 tablet (100mg) with 1 tablet (400mg) for total of 500mg by mouth at bedtime 30 tablet 2     QUEtiapine (SEROQUEL) 400 MG tablet Take 1 tablet (400mg) with 1 tablet (100mg) for total of 500mg by mouth at bedtime 30 tablet 2     QUEtiapine (SEROQUEL) 50 MG tablet Take 0.5-1 tablets (25-50 mg) by mouth 2 times daily as needed (for sleep, mood and anxiety). 60 tablet 2     rivaroxaban ANTICOAGULANT (XARELTO) 20 MG TABS tablet Take 1 tablet (20 mg) by mouth daily (with dinner). 90 tablet 3     sertraline (ZOLOFT) 100 MG tablet Take 0.5 tablets (50 mg) by mouth daily. 30 tablet 1     Vitamin D3 (CHOLECALCIFEROL) 25 mcg (1000 units) tablet Take 1 tablet (25 mcg) by mouth daily. 90 tablet 3     Rivaroxaban ANTICOAGULANT 15 & 20 MG TBPK Starter Therapy Pack Take 15 mg by mouth 2 times daily (with meals) for 21 days, THEN 20 mg daily with food for 9 days. 51 each 0     No current facility-administered medications for this visit.   '    Physical exam:  /75   Pulse 97   Temp 98.2  F (36.8  C)   Resp 16   Wt 109.8 kg (242 lb)   SpO2 98%   BMI 32.82 kg/m    General:  Well appearing adult female in NAD.  Alert and oriented x 3.  HEENT:  Normocephalic.  Sclera anicteric.  MMM.  No lesions of the oropharynx.  Lymph:  No palpable cervical, supraclavicular, or axillary LAD.  Chest:  CTA bilaterally.  No wheezes or crackles.  CV:  RRR.    Abd:  Soft/NT/ND.    Ext:  No pitting edema of the bilateral lower extremities.    Musculo:  Moves all 4 extremities appropriately.  Neuro:   Cranial nerves grossly intact.  No tremors or dyskinetic movements.  Able to climb on the exam table unaided.  Psych:  Mood and affect appear normal.  Skin:  No visible concerning skin rashes or lesions    Laboratory/Imaging Studies:   I personally reviewed the below laboratories and images while in clinic today:   03/27/25 10:33   Sodium 140   Potassium 4.1   Chloride 106   Carbon Dioxide (CO2) 23   Urea Nitrogen 11.5   Creatinine 1.08 (H)   GFR Estimate 63   Calcium 9.3   Anion Gap 11   Albumin 3.8   Protein Total 7.1   Alkaline Phosphatase 96   ALT 11   AST 22   Bilirubin Total 0.2   Glucose 116 (H)   WBC 3.2 (L)   Hemoglobin 10.6 (L)   Hematocrit 32.5 (L)   Platelet Count 153   RBC Count 3.17 (L)    (H)   MCH 33.4 (H)   MCHC 32.6   RDW 13.7   % Neutrophils 48   % Lymphocytes 35   % Monocytes 11   % Eosinophils 4   % Basophils 2   Absolute Basophils 0.1   Absolute Eosinophils 0.1   Absolute Immature Granulocytes 0.0   Absolute Lymphocytes 1.1   Absolute Monocytes 0.4   % Immature Granulocytes 0   Absolute Neutrophils 1.5 (L)   Absolute NRBCs 0.0   NRBCs per 100 WBC 0      12/23/24 15:05 01/20/25 12:22 03/04/25 11:11   Cancer Antigen 15-3 50 (H) 48 (H) 57 (H)   CEA 4.2 3.7 4.3       ASSESSMENT/PLAN:   49 year old female with history of DVT and ER positive left breast cancer metastasized to bones.    1.  Metastatic breast cancer: On treatment with abemaciclib and fulvestrant from 01/2024 - 02/2025.  PET/CT on 2/3/2024 with progression of bone metastases (left sacroiliac, T7 vertebrae, right 6th rib, right femoral head).     - She was not compliant with taking her treatment at the time of the PET/CT.  She has been more compliant with treatment. Short term follow-up PET scheduled for next month.   - She is s/p left iliac bone biopsy on 2/18/2025.  We reviewed that the bone biopsy confirmed metastatic breast cancer, ER positive.  This was sent for [employer] on 2/26/2025.  I received a message after our  appointment that NGS is unable to be performed on the bone biopsy due to decalcified sample making the DNA quality insufficient. Thus [employer] 360 CDx liquid biopsy for NGS was done and is still in process. Of note, ESR1, PIK3CA, Akt, and PTEN status will help guide next line treatment options.  -Abemaciclib is currently dosed at 100 mg PO BID secondary to hand foot syndrome.  Symptoms improved with the dose reduction.  Will continue this dose.  - Fulvestrant was given after visit today.     2.  Bone metastases/low back pain with LLE sciatica:  She is s/p XRT to the left ilium as well as the lumbar spine and kyphoplasty of L4--with some extravasation of cement which procedularist is aware of and recommended continued AC indefinitely.   - received radiation to the left acetabulum and the SI, completed on 7/18/2024.  - Most recent PET/CT with disease progression left SI joint.  She has associated pain. This is stable. Due to prior radiation, not a candidate for further radiation here.  - She is currently taking oxycodone, Robaxin, and gabapentin.  Ongoing follow up with palliative medicine.    - Receiving Zometa once every 3 months. Next due early June.     3.  Schizoaffective disorder, bipolar type: Waxing and waning. A few bad days earlier this week with the anniversary of her son's death. She is on treatment with Seroquel and Zoloft. Recommended she follow up with psychiatry.     4.  FDG uptake stomach/dyspepsia: Had EGD done showing esophagitis and non bleeding gastric ulcers. She was positive for H. Pylori. Started treatment for this and has a few days left. Plan for stool antigen test one month after completion of PPI.      5.  FELTON:  Secondary to abemaciclib. Cr is stable. Continue to avoid NSAIDs and encouraged good hydration.     35 minutes spent on the date of the encounter doing chart review, review of test results, interpretation of tests, patient visit, and documentation     The longitudinal plan of care  for the diagnosis(es)/condition(s) as documented were addressed during this visit. Due to the added complexity in care, I will continue to support Cleaster in the subsequent management and with ongoing continuity of care.    Alee De Los Santos PA-C               Again, thank you for allowing me to participate in the care of your patient.        Sincerely,        Alee De Los Santos PA-C    Electronically signed

## 2025-03-27 NOTE — NURSING NOTE
Faslodex given into bilateral ventrogluteals without incident. Pt tolerated well.    Administrations This Visit       fulvestrant (FASLODEX) injection 500 mg       Admin Date  03/27/2025 Action  $Given Dose  500 mg Route  Intramuscular Documented By  Sade Parekh, RN                  Sade Parekh RN on 3/27/2025 at 11:21 AM

## 2025-03-27 NOTE — NURSING NOTE
Oncology Rooming Note    March 27, 2025 10:39 AM   Teresa Sanderson is a 49 year old female who presents for:    Chief Complaint   Patient presents with    Oncology Clinic Visit     Carcinoma of left breast metastatic to bone     Initial Vitals: /75   Pulse 97   Temp 98.2  F (36.8  C)   Resp 16   Wt 109.8 kg (242 lb)   SpO2 98%   BMI 32.82 kg/m   Estimated body mass index is 32.82 kg/m  as calculated from the following:    Height as of 3/7/25: 1.829 m (6').    Weight as of this encounter: 109.8 kg (242 lb). Body surface area is 2.36 meters squared.  Moderate Pain (5) Comment: Data Unavailable   No LMP recorded. (Menstrual status: Tubal ).  Allergies reviewed: Yes  Medications reviewed: Yes    Medications: MEDICATION REFILLS NEEDED TODAY. Provider was notified.  Pharmacy name entered into EPIC:    [facility] #[id number] - [place], [place] - [address] AT [address]  [facility] - [place], [place] - [address] [facility]  [facility][id number] - [place], [place] - [address] AT [facility] & [facility]  [id number] - [place], [place] - [address] AT [address] [place] [address] & [facility] - [place], [place] - [address]  [facility] #[id number] - [place], [place] - [address] AT [facility] [address]    Frailty Screening:   Is the patient here for a new oncology consult visit in cancer care? 2. No    PHQ9:  Did this patient require a PHQ9?: No      Clinical concerns: Patient states no new concerns to discuss with provider.       Aleksandra Murillo, EMT

## 2025-04-02 ENCOUNTER — MYC REFILL (OUTPATIENT)
Dept: PALLIATIVE CARE | Facility: CLINIC | Age: 50
End: 2025-04-02
Payer: MEDICARE

## 2025-04-02 DIAGNOSIS — Z17.0 MALIGNANT NEOPLASM OF OVERLAPPING SITES OF LEFT BREAST IN FEMALE, ESTROGEN RECEPTOR POSITIVE (H): ICD-10-CM

## 2025-04-02 DIAGNOSIS — C50.812 MALIGNANT NEOPLASM OF OVERLAPPING SITES OF LEFT BREAST IN FEMALE, ESTROGEN RECEPTOR POSITIVE (H): ICD-10-CM

## 2025-04-02 DIAGNOSIS — G89.3 CANCER ASSOCIATED PAIN: ICD-10-CM

## 2025-04-03 RX ORDER — OXYCODONE HYDROCHLORIDE 5 MG/1
5-10 TABLET ORAL EVERY 4 HOURS PRN
Qty: 200 TABLET | Refills: 0 | Status: SHIPPED | OUTPATIENT
Start: 2025-04-03

## 2025-04-03 NOTE — TELEPHONE ENCOUNTER
Received [employer] message from patient requesting refill of oxycodone.     Last refill: 3/13/2025  Last office visit: 1/21/2025 (3/25/2025 cancelled)  Scheduled for follow up 4/22/2025     Will route request to MD/ for review.     Reviewed [place]  Report.

## 2025-04-06 ENCOUNTER — HOSPITAL ENCOUNTER (INPATIENT)
Facility: CLINIC | Age: 50
LOS: 7 days | Discharge: HOME HEALTH | DRG: 543 | End: 2025-04-14
Attending: EMERGENCY MEDICINE | Admitting: STUDENT IN AN ORGANIZED HEALTH CARE EDUCATION/TRAINING PROGRAM
Payer: MEDICARE

## 2025-04-06 ENCOUNTER — APPOINTMENT (OUTPATIENT)
Dept: CT IMAGING | Facility: CLINIC | Age: 50
DRG: 543 | End: 2025-04-06
Attending: EMERGENCY MEDICINE
Payer: MEDICARE

## 2025-04-06 ENCOUNTER — APPOINTMENT (OUTPATIENT)
Dept: GENERAL RADIOLOGY | Facility: CLINIC | Age: 50
DRG: 543 | End: 2025-04-06
Attending: EMERGENCY MEDICINE
Payer: MEDICARE

## 2025-04-06 DIAGNOSIS — G89.3 CANCER ASSOCIATED PAIN: ICD-10-CM

## 2025-04-06 DIAGNOSIS — R52 UNCONTROLLED PAIN: ICD-10-CM

## 2025-04-06 DIAGNOSIS — C79.51 CARCINOMA OF LEFT BREAST METASTATIC TO BONE (H): ICD-10-CM

## 2025-04-06 DIAGNOSIS — C79.51 METASTASIS TO SPINAL COLUMN (H): ICD-10-CM

## 2025-04-06 DIAGNOSIS — C79.51 CARCINOMA OF BREAST METASTATIC TO BONE, UNSPECIFIED LATERALITY (H): ICD-10-CM

## 2025-04-06 DIAGNOSIS — F25.0 SCHIZOAFFECTIVE DISORDER, BIPOLAR TYPE (H): ICD-10-CM

## 2025-04-06 DIAGNOSIS — Z17.0 MALIGNANT NEOPLASM OF OVERLAPPING SITES OF LEFT BREAST IN FEMALE, ESTROGEN RECEPTOR POSITIVE (H): ICD-10-CM

## 2025-04-06 DIAGNOSIS — C50.919 CARCINOMA OF BREAST METASTATIC TO BONE, UNSPECIFIED LATERALITY (H): ICD-10-CM

## 2025-04-06 DIAGNOSIS — S32.009A CLOSED FRACTURE OF PEDICLE OF LUMBAR VERTEBRA, INITIAL ENCOUNTER (H): ICD-10-CM

## 2025-04-06 DIAGNOSIS — C79.51 MALIGNANT NEOPLASM METASTATIC TO BONE (H): ICD-10-CM

## 2025-04-06 DIAGNOSIS — R06.02 SHORTNESS OF BREATH: Primary | ICD-10-CM

## 2025-04-06 DIAGNOSIS — C50.812 MALIGNANT NEOPLASM OF OVERLAPPING SITES OF LEFT BREAST IN FEMALE, ESTROGEN RECEPTOR POSITIVE (H): ICD-10-CM

## 2025-04-06 DIAGNOSIS — C50.919 MALIGNANT NEOPLASM OF FEMALE BREAST, UNSPECIFIED ESTROGEN RECEPTOR STATUS, UNSPECIFIED LATERALITY, UNSPECIFIED SITE OF BREAST (H): ICD-10-CM

## 2025-04-06 DIAGNOSIS — C50.912 CARCINOMA OF LEFT BREAST METASTATIC TO BONE (H): ICD-10-CM

## 2025-04-06 DIAGNOSIS — C79.51 METASTASIS TO BONE (H): ICD-10-CM

## 2025-04-06 LAB
BASOPHILS # BLD AUTO: 0.1 10E3/UL (ref 0–0.2)
BASOPHILS NFR BLD AUTO: 2 %
EOSINOPHIL # BLD AUTO: 0.1 10E3/UL (ref 0–0.7)
EOSINOPHIL NFR BLD AUTO: 3 %
ERYTHROCYTE [DISTWIDTH] IN BLOOD BY AUTOMATED COUNT: 12.9 % (ref 10–15)
HCT VFR BLD AUTO: 33.7 % (ref 35–47)
HGB BLD-MCNC: 10.9 G/DL (ref 11.7–15.7)
IMM GRANULOCYTES # BLD: 0 10E3/UL
IMM GRANULOCYTES NFR BLD: 0 %
LYMPHOCYTES # BLD AUTO: 1.3 10E3/UL (ref 0.8–5.3)
LYMPHOCYTES NFR BLD AUTO: 33 %
MCH RBC QN AUTO: 33.7 PG (ref 26.5–33)
MCHC RBC AUTO-ENTMCNC: 32.3 G/DL (ref 31.5–36.5)
MCV RBC AUTO: 104 FL (ref 78–100)
MONOCYTES # BLD AUTO: 0.3 10E3/UL (ref 0–1.3)
MONOCYTES NFR BLD AUTO: 7 %
NEUTROPHILS # BLD AUTO: 2 10E3/UL (ref 1.6–8.3)
NEUTROPHILS NFR BLD AUTO: 54 %
NRBC # BLD AUTO: 0 10E3/UL
NRBC BLD AUTO-RTO: 0 /100
PLATELET # BLD AUTO: 200 10E3/UL (ref 150–450)
RBC # BLD AUTO: 3.23 10E6/UL (ref 3.8–5.2)
WBC # BLD AUTO: 3.8 10E3/UL (ref 4–11)

## 2025-04-06 PROCEDURE — 71046 X-RAY EXAM CHEST 2 VIEWS: CPT | Mod: 26 | Performed by: RADIOLOGY

## 2025-04-06 PROCEDURE — 99285 EMERGENCY DEPT VISIT HI MDM: CPT | Performed by: EMERGENCY MEDICINE

## 2025-04-06 PROCEDURE — 36415 COLL VENOUS BLD VENIPUNCTURE: CPT | Performed by: EMERGENCY MEDICINE

## 2025-04-06 PROCEDURE — 258N000003 HC RX IP 258 OP 636: Performed by: EMERGENCY MEDICINE

## 2025-04-06 PROCEDURE — 72131 CT LUMBAR SPINE W/O DYE: CPT

## 2025-04-06 PROCEDURE — 85048 AUTOMATED LEUKOCYTE COUNT: CPT | Performed by: EMERGENCY MEDICINE

## 2025-04-06 PROCEDURE — 96374 THER/PROPH/DIAG INJ IV PUSH: CPT | Performed by: EMERGENCY MEDICINE

## 2025-04-06 PROCEDURE — 71046 X-RAY EXAM CHEST 2 VIEWS: CPT

## 2025-04-06 PROCEDURE — 99285 EMERGENCY DEPT VISIT HI MDM: CPT | Mod: 25 | Performed by: EMERGENCY MEDICINE

## 2025-04-06 PROCEDURE — 93005 ELECTROCARDIOGRAM TRACING: CPT | Performed by: EMERGENCY MEDICINE

## 2025-04-06 PROCEDURE — 85004 AUTOMATED DIFF WBC COUNT: CPT | Performed by: EMERGENCY MEDICINE

## 2025-04-06 PROCEDURE — 96361 HYDRATE IV INFUSION ADD-ON: CPT | Performed by: EMERGENCY MEDICINE

## 2025-04-06 PROCEDURE — 250N000011 HC RX IP 250 OP 636: Mod: JZ | Performed by: EMERGENCY MEDICINE

## 2025-04-06 PROCEDURE — 83880 ASSAY OF NATRIURETIC PEPTIDE: CPT | Performed by: EMERGENCY MEDICINE

## 2025-04-06 PROCEDURE — 72131 CT LUMBAR SPINE W/O DYE: CPT | Mod: 26 | Performed by: RADIOLOGY

## 2025-04-06 PROCEDURE — 84484 ASSAY OF TROPONIN QUANT: CPT | Performed by: EMERGENCY MEDICINE

## 2025-04-06 PROCEDURE — 72192 CT PELVIS W/O DYE: CPT

## 2025-04-06 PROCEDURE — 82310 ASSAY OF CALCIUM: CPT | Performed by: EMERGENCY MEDICINE

## 2025-04-06 PROCEDURE — 93010 ELECTROCARDIOGRAM REPORT: CPT | Performed by: EMERGENCY MEDICINE

## 2025-04-06 PROCEDURE — 250N000013 HC RX MED GY IP 250 OP 250 PS 637: Performed by: EMERGENCY MEDICINE

## 2025-04-06 RX ORDER — METHOCARBAMOL 500 MG/1
1000 TABLET, FILM COATED ORAL ONCE
Status: COMPLETED | OUTPATIENT
Start: 2025-04-06 | End: 2025-04-06

## 2025-04-06 RX ORDER — ONDANSETRON 2 MG/ML
4 INJECTION INTRAMUSCULAR; INTRAVENOUS ONCE
Status: DISCONTINUED | OUTPATIENT
Start: 2025-04-06 | End: 2025-04-06

## 2025-04-06 RX ORDER — HYDROMORPHONE HYDROCHLORIDE 1 MG/ML
0.5 INJECTION, SOLUTION INTRAMUSCULAR; INTRAVENOUS; SUBCUTANEOUS ONCE
Status: COMPLETED | OUTPATIENT
Start: 2025-04-06 | End: 2025-04-06

## 2025-04-06 RX ADMIN — SODIUM CHLORIDE 500 ML: 9 INJECTION, SOLUTION INTRAVENOUS at 23:47

## 2025-04-06 RX ADMIN — HYDROMORPHONE HYDROCHLORIDE 0.5 MG: 1 INJECTION, SOLUTION INTRAMUSCULAR; INTRAVENOUS; SUBCUTANEOUS at 23:42

## 2025-04-06 RX ADMIN — METHOCARBAMOL 1000 MG: 500 TABLET ORAL at 23:42

## 2025-04-06 ASSESSMENT — COLUMBIA-SUICIDE SEVERITY RATING SCALE - C-SSRS
6. HAVE YOU EVER DONE ANYTHING, STARTED TO DO ANYTHING, OR PREPARED TO DO ANYTHING TO END YOUR LIFE?: NO
2. HAVE YOU ACTUALLY HAD ANY THOUGHTS OF KILLING YOURSELF IN THE PAST MONTH?: NO
1. IN THE PAST MONTH, HAVE YOU WISHED YOU WERE DEAD OR WISHED YOU COULD GO TO SLEEP AND NOT WAKE UP?: NO

## 2025-04-06 ASSESSMENT — ACTIVITIES OF DAILY LIVING (ADL): ADLS_ACUITY_SCORE: 51

## 2025-04-07 ENCOUNTER — APPOINTMENT (OUTPATIENT)
Dept: ULTRASOUND IMAGING | Facility: CLINIC | Age: 50
DRG: 543 | End: 2025-04-07
Attending: STUDENT IN AN ORGANIZED HEALTH CARE EDUCATION/TRAINING PROGRAM
Payer: MEDICARE

## 2025-04-07 ENCOUNTER — APPOINTMENT (OUTPATIENT)
Dept: MRI IMAGING | Facility: CLINIC | Age: 50
DRG: 543 | End: 2025-04-07
Attending: STUDENT IN AN ORGANIZED HEALTH CARE EDUCATION/TRAINING PROGRAM
Payer: MEDICARE

## 2025-04-07 ENCOUNTER — PATIENT OUTREACH (OUTPATIENT)
Dept: ONCOLOGY | Facility: CLINIC | Age: 50
End: 2025-04-07

## 2025-04-07 PROBLEM — C79.51 CARCINOMA OF BREAST METASTATIC TO BONE, UNSPECIFIED LATERALITY (H): Status: ACTIVE | Noted: 2025-04-07

## 2025-04-07 PROBLEM — C50.919 CARCINOMA OF BREAST METASTATIC TO BONE, UNSPECIFIED LATERALITY (H): Status: ACTIVE | Noted: 2025-04-07

## 2025-04-07 PROBLEM — R52 UNCONTROLLED PAIN: Status: ACTIVE | Noted: 2025-04-07

## 2025-04-07 LAB
ALBUMIN SERPL BCG-MCNC: 3.8 G/DL (ref 3.5–5.2)
ALBUMIN SERPL BCG-MCNC: 3.9 G/DL (ref 3.5–5.2)
ALBUMIN UR-MCNC: 10 MG/DL
ALP SERPL-CCNC: 86 U/L (ref 40–150)
ALP SERPL-CCNC: 93 U/L (ref 40–150)
ALT SERPL W P-5'-P-CCNC: 10 U/L (ref 0–50)
ALT SERPL W P-5'-P-CCNC: 9 U/L (ref 0–50)
ANION GAP SERPL CALCULATED.3IONS-SCNC: 12 MMOL/L (ref 7–15)
ANION GAP SERPL CALCULATED.3IONS-SCNC: 12 MMOL/L (ref 7–15)
APPEARANCE UR: CLEAR
AST SERPL W P-5'-P-CCNC: 16 U/L (ref 0–45)
AST SERPL W P-5'-P-CCNC: 18 U/L (ref 0–45)
ATRIAL RATE - MUSE: 74 BPM
BASOPHILS # BLD AUTO: 0.1 10E3/UL (ref 0–0.2)
BASOPHILS NFR BLD AUTO: 2 %
BILIRUB SERPL-MCNC: 0.2 MG/DL
BILIRUB SERPL-MCNC: 0.2 MG/DL
BILIRUB UR QL STRIP: NEGATIVE
BUN SERPL-MCNC: 15.5 MG/DL (ref 6–20)
BUN SERPL-MCNC: 15.9 MG/DL (ref 6–20)
CALCIUM SERPL-MCNC: 10.1 MG/DL (ref 8.8–10.4)
CALCIUM SERPL-MCNC: 9.5 MG/DL (ref 8.8–10.4)
CHLORIDE SERPL-SCNC: 102 MMOL/L (ref 98–107)
CHLORIDE SERPL-SCNC: 104 MMOL/L (ref 98–107)
COLOR UR AUTO: ABNORMAL
CREAT SERPL-MCNC: 1.17 MG/DL (ref 0.51–0.95)
CREAT SERPL-MCNC: 1.3 MG/DL (ref 0.51–0.95)
DIASTOLIC BLOOD PRESSURE - MUSE: NORMAL MMHG
EGFRCR SERPLBLD CKD-EPI 2021: 50 ML/MIN/1.73M2
EGFRCR SERPLBLD CKD-EPI 2021: 57 ML/MIN/1.73M2
EOSINOPHIL # BLD AUTO: 0.2 10E3/UL (ref 0–0.7)
EOSINOPHIL NFR BLD AUTO: 4 %
ERYTHROCYTE [DISTWIDTH] IN BLOOD BY AUTOMATED COUNT: 13 % (ref 10–15)
GLUCOSE SERPL-MCNC: 118 MG/DL (ref 70–99)
GLUCOSE SERPL-MCNC: 133 MG/DL (ref 70–99)
GLUCOSE UR STRIP-MCNC: NEGATIVE MG/DL
HCO3 SERPL-SCNC: 23 MMOL/L (ref 22–29)
HCO3 SERPL-SCNC: 25 MMOL/L (ref 22–29)
HCT VFR BLD AUTO: 31.5 % (ref 35–47)
HGB BLD-MCNC: 10.2 G/DL (ref 11.7–15.7)
HGB UR QL STRIP: NEGATIVE
HYALINE CASTS: 1 /LPF
IMM GRANULOCYTES # BLD: 0 10E3/UL
IMM GRANULOCYTES NFR BLD: 0 %
INTERPRETATION ECG - MUSE: NORMAL
KETONES UR STRIP-MCNC: NEGATIVE MG/DL
LEUKOCYTE ESTERASE UR QL STRIP: NEGATIVE
LYMPHOCYTES # BLD AUTO: 1.4 10E3/UL (ref 0.8–5.3)
LYMPHOCYTES NFR BLD AUTO: 38 %
MCH RBC QN AUTO: 33.1 PG (ref 26.5–33)
MCHC RBC AUTO-ENTMCNC: 32.4 G/DL (ref 31.5–36.5)
MCV RBC AUTO: 102 FL (ref 78–100)
MONOCYTES # BLD AUTO: 0.2 10E3/UL (ref 0–1.3)
MONOCYTES NFR BLD AUTO: 7 %
MUCOUS THREADS #/AREA URNS LPF: PRESENT /LPF
NEUTROPHILS # BLD AUTO: 1.8 10E3/UL (ref 1.6–8.3)
NEUTROPHILS NFR BLD AUTO: 49 %
NITRATE UR QL: NEGATIVE
NRBC # BLD AUTO: 0 10E3/UL
NRBC BLD AUTO-RTO: 0 /100
NT-PROBNP SERPL-MCNC: <36 PG/ML (ref 0–450)
P AXIS - MUSE: 60 DEGREES
PH UR STRIP: 5.5 [PH] (ref 5–7)
PLATELET # BLD AUTO: 195 10E3/UL (ref 150–450)
POTASSIUM SERPL-SCNC: 3.8 MMOL/L (ref 3.4–5.3)
POTASSIUM SERPL-SCNC: 4.1 MMOL/L (ref 3.4–5.3)
PR INTERVAL - MUSE: 170 MS
PROT SERPL-MCNC: 7.3 G/DL (ref 6.4–8.3)
PROT SERPL-MCNC: 7.6 G/DL (ref 6.4–8.3)
QRS DURATION - MUSE: 84 MS
QT - MUSE: 388 MS
QTC - MUSE: 430 MS
R AXIS - MUSE: 38 DEGREES
RBC # BLD AUTO: 3.08 10E6/UL (ref 3.8–5.2)
RBC URINE: <1 /HPF
SODIUM SERPL-SCNC: 139 MMOL/L (ref 135–145)
SODIUM SERPL-SCNC: 139 MMOL/L (ref 135–145)
SP GR UR STRIP: 1.03 (ref 1–1.03)
SQUAMOUS EPITHELIAL: <1 /HPF
SYSTOLIC BLOOD PRESSURE - MUSE: NORMAL MMHG
T AXIS - MUSE: 57 DEGREES
TROPONIN T SERPL HS-MCNC: <6 NG/L
UROBILINOGEN UR STRIP-MCNC: NORMAL MG/DL
VENTRICULAR RATE- MUSE: 74 BPM
WBC # BLD AUTO: 3.7 10E3/UL (ref 4–11)
WBC URINE: 1 /HPF

## 2025-04-07 PROCEDURE — 81003 URINALYSIS AUTO W/O SCOPE: CPT | Performed by: EMERGENCY MEDICINE

## 2025-04-07 PROCEDURE — 250N000013 HC RX MED GY IP 250 OP 250 PS 637

## 2025-04-07 PROCEDURE — 250N000011 HC RX IP 250 OP 636: Mod: JZ | Performed by: STUDENT IN AN ORGANIZED HEALTH CARE EDUCATION/TRAINING PROGRAM

## 2025-04-07 PROCEDURE — 72146 MRI CHEST SPINE W/O DYE: CPT

## 2025-04-07 PROCEDURE — 250N000013 HC RX MED GY IP 250 OP 250 PS 637: Performed by: INTERNAL MEDICINE

## 2025-04-07 PROCEDURE — 85041 AUTOMATED RBC COUNT: CPT

## 2025-04-07 PROCEDURE — 99222 1ST HOSP IP/OBS MODERATE 55: CPT | Performed by: INTERNAL MEDICINE

## 2025-04-07 PROCEDURE — 72146 MRI CHEST SPINE W/O DYE: CPT | Mod: 26 | Performed by: RADIOLOGY

## 2025-04-07 PROCEDURE — 85004 AUTOMATED DIFF WBC COUNT: CPT

## 2025-04-07 PROCEDURE — 36415 COLL VENOUS BLD VENIPUNCTURE: CPT

## 2025-04-07 PROCEDURE — 250N000013 HC RX MED GY IP 250 OP 250 PS 637: Performed by: EMERGENCY MEDICINE

## 2025-04-07 PROCEDURE — 84460 ALANINE AMINO (ALT) (SGPT): CPT

## 2025-04-07 PROCEDURE — 250N000011 HC RX IP 250 OP 636

## 2025-04-07 PROCEDURE — 93970 EXTREMITY STUDY: CPT

## 2025-04-07 PROCEDURE — 99223 1ST HOSP IP/OBS HIGH 75: CPT | Mod: GC | Performed by: STUDENT IN AN ORGANIZED HEALTH CARE EDUCATION/TRAINING PROGRAM

## 2025-04-07 PROCEDURE — 93970 EXTREMITY STUDY: CPT | Mod: 26 | Performed by: STUDENT IN AN ORGANIZED HEALTH CARE EDUCATION/TRAINING PROGRAM

## 2025-04-07 PROCEDURE — 250N000011 HC RX IP 250 OP 636: Mod: JZ | Performed by: EMERGENCY MEDICINE

## 2025-04-07 PROCEDURE — 250N000013 HC RX MED GY IP 250 OP 250 PS 637: Performed by: STUDENT IN AN ORGANIZED HEALTH CARE EDUCATION/TRAINING PROGRAM

## 2025-04-07 PROCEDURE — 120N000005 HC R&B MS OVERFLOW UMMC

## 2025-04-07 PROCEDURE — 82565 ASSAY OF CREATININE: CPT

## 2025-04-07 PROCEDURE — 72148 MRI LUMBAR SPINE W/O DYE: CPT | Mod: 26 | Performed by: RADIOLOGY

## 2025-04-07 PROCEDURE — 72148 MRI LUMBAR SPINE W/O DYE: CPT

## 2025-04-07 PROCEDURE — 81001 URINALYSIS AUTO W/SCOPE: CPT | Performed by: EMERGENCY MEDICINE

## 2025-04-07 RX ORDER — BISACODYL 10 MG
10 SUPPOSITORY, RECTAL RECTAL DAILY PRN
Status: DISCONTINUED | OUTPATIENT
Start: 2025-04-07 | End: 2025-04-14 | Stop reason: HOSPADM

## 2025-04-07 RX ORDER — HYDROMORPHONE HYDROCHLORIDE 1 MG/ML
0.5 INJECTION, SOLUTION INTRAMUSCULAR; INTRAVENOUS; SUBCUTANEOUS
Status: DISCONTINUED | OUTPATIENT
Start: 2025-04-07 | End: 2025-04-09

## 2025-04-07 RX ORDER — OLANZAPINE 2.5 MG/1
2.5 TABLET, FILM COATED ORAL DAILY PRN
Status: DISCONTINUED | OUTPATIENT
Start: 2025-04-07 | End: 2025-04-14 | Stop reason: HOSPADM

## 2025-04-07 RX ORDER — HYDROMORPHONE HYDROCHLORIDE 1 MG/ML
0.5 INJECTION, SOLUTION INTRAMUSCULAR; INTRAVENOUS; SUBCUTANEOUS
Status: DISCONTINUED | OUTPATIENT
Start: 2025-04-07 | End: 2025-04-07

## 2025-04-07 RX ORDER — OXYCODONE HYDROCHLORIDE 5 MG/1
5 TABLET ORAL EVERY 4 HOURS PRN
Status: DISCONTINUED | OUTPATIENT
Start: 2025-04-07 | End: 2025-04-07

## 2025-04-07 RX ORDER — AMOXICILLIN 250 MG
1 CAPSULE ORAL 2 TIMES DAILY
Status: DISCONTINUED | OUTPATIENT
Start: 2025-04-07 | End: 2025-04-14 | Stop reason: HOSPADM

## 2025-04-07 RX ORDER — OLANZAPINE 5 MG/1
5-10 TABLET, FILM COATED ORAL
Status: DISCONTINUED | OUTPATIENT
Start: 2025-04-07 | End: 2025-04-14 | Stop reason: HOSPADM

## 2025-04-07 RX ORDER — OXYCODONE HYDROCHLORIDE 5 MG/1
5 TABLET ORAL
Status: DISCONTINUED | OUTPATIENT
Start: 2025-04-07 | End: 2025-04-09

## 2025-04-07 RX ORDER — OXYCODONE HYDROCHLORIDE 10 MG/1
10 TABLET ORAL 4 TIMES DAILY
Status: DISCONTINUED | OUTPATIENT
Start: 2025-04-07 | End: 2025-04-09

## 2025-04-07 RX ORDER — ONDANSETRON 2 MG/ML
4 INJECTION INTRAMUSCULAR; INTRAVENOUS EVERY 6 HOURS PRN
Status: DISCONTINUED | OUTPATIENT
Start: 2025-04-07 | End: 2025-04-14 | Stop reason: HOSPADM

## 2025-04-07 RX ORDER — AMOXICILLIN 250 MG
1 CAPSULE ORAL 2 TIMES DAILY PRN
Status: DISCONTINUED | OUTPATIENT
Start: 2025-04-07 | End: 2025-04-14 | Stop reason: HOSPADM

## 2025-04-07 RX ORDER — OXYCODONE HYDROCHLORIDE 5 MG/1
5 TABLET ORAL EVERY 4 HOURS
Status: DISCONTINUED | OUTPATIENT
Start: 2025-04-07 | End: 2025-04-07

## 2025-04-07 RX ORDER — GABAPENTIN 300 MG/1
900 CAPSULE ORAL 3 TIMES DAILY
Status: DISCONTINUED | OUTPATIENT
Start: 2025-04-07 | End: 2025-04-07

## 2025-04-07 RX ORDER — ACETAMINOPHEN 650 MG/1
975 SUPPOSITORY RECTAL EVERY 6 HOURS PRN
Status: DISCONTINUED | OUTPATIENT
Start: 2025-04-07 | End: 2025-04-07

## 2025-04-07 RX ORDER — LIDOCAINE 40 MG/G
CREAM TOPICAL
Status: DISCONTINUED | OUTPATIENT
Start: 2025-04-07 | End: 2025-04-14 | Stop reason: HOSPADM

## 2025-04-07 RX ORDER — SERTRALINE HYDROCHLORIDE 25 MG/1
50 TABLET, FILM COATED ORAL DAILY
Status: DISCONTINUED | OUTPATIENT
Start: 2025-04-07 | End: 2025-04-08

## 2025-04-07 RX ORDER — OXYCODONE HYDROCHLORIDE 10 MG/1
10 TABLET ORAL EVERY 4 HOURS PRN
Status: DISCONTINUED | OUTPATIENT
Start: 2025-04-07 | End: 2025-04-07

## 2025-04-07 RX ORDER — POLYETHYLENE GLYCOL 3350 17 G/17G
17 POWDER, FOR SOLUTION ORAL 2 TIMES DAILY PRN
Status: DISCONTINUED | OUTPATIENT
Start: 2025-04-07 | End: 2025-04-14 | Stop reason: HOSPADM

## 2025-04-07 RX ORDER — NALOXONE HYDROCHLORIDE 0.4 MG/ML
0.4 INJECTION, SOLUTION INTRAMUSCULAR; INTRAVENOUS; SUBCUTANEOUS
Status: DISCONTINUED | OUTPATIENT
Start: 2025-04-07 | End: 2025-04-14 | Stop reason: HOSPADM

## 2025-04-07 RX ORDER — ACETAMINOPHEN 325 MG/1
975 TABLET ORAL EVERY 6 HOURS PRN
Status: DISCONTINUED | OUTPATIENT
Start: 2025-04-07 | End: 2025-04-07

## 2025-04-07 RX ORDER — NALOXONE HYDROCHLORIDE 0.4 MG/ML
0.2 INJECTION, SOLUTION INTRAMUSCULAR; INTRAVENOUS; SUBCUTANEOUS
Status: DISCONTINUED | OUTPATIENT
Start: 2025-04-07 | End: 2025-04-14 | Stop reason: HOSPADM

## 2025-04-07 RX ORDER — OXYCODONE HYDROCHLORIDE 10 MG/1
10 TABLET ORAL ONCE
Status: COMPLETED | OUTPATIENT
Start: 2025-04-07 | End: 2025-04-07

## 2025-04-07 RX ORDER — SALIVA STIMULANT COMB. NO.3
1 SPRAY, NON-AEROSOL (ML) MUCOUS MEMBRANE
Status: DISCONTINUED | OUTPATIENT
Start: 2025-04-07 | End: 2025-04-14 | Stop reason: HOSPADM

## 2025-04-07 RX ORDER — LIDOCAINE 4 G/G
3 PATCH TOPICAL
Status: DISCONTINUED | OUTPATIENT
Start: 2025-04-07 | End: 2025-04-14 | Stop reason: HOSPADM

## 2025-04-07 RX ORDER — PANTOPRAZOLE SODIUM 40 MG/1
40 TABLET, DELAYED RELEASE ORAL
Status: DISCONTINUED | OUTPATIENT
Start: 2025-04-07 | End: 2025-04-14 | Stop reason: HOSPADM

## 2025-04-07 RX ORDER — ACETAMINOPHEN 325 MG/1
975 TABLET ORAL EVERY 6 HOURS
Status: DISCONTINUED | OUTPATIENT
Start: 2025-04-07 | End: 2025-04-07

## 2025-04-07 RX ORDER — HYDROXYZINE HYDROCHLORIDE 25 MG/1
25 TABLET, FILM COATED ORAL 4 TIMES DAILY PRN
Status: DISCONTINUED | OUTPATIENT
Start: 2025-04-07 | End: 2025-04-14 | Stop reason: HOSPADM

## 2025-04-07 RX ORDER — VITAMIN B COMPLEX
25 TABLET ORAL DAILY
Status: DISCONTINUED | OUTPATIENT
Start: 2025-04-07 | End: 2025-04-14 | Stop reason: HOSPADM

## 2025-04-07 RX ORDER — ACETAMINOPHEN 325 MG/1
975 TABLET ORAL 3 TIMES DAILY
Status: DISCONTINUED | OUTPATIENT
Start: 2025-04-07 | End: 2025-04-14 | Stop reason: HOSPADM

## 2025-04-07 RX ORDER — PROCHLORPERAZINE MALEATE 5 MG/1
10 TABLET ORAL EVERY 6 HOURS PRN
Status: DISCONTINUED | OUTPATIENT
Start: 2025-04-07 | End: 2025-04-14 | Stop reason: HOSPADM

## 2025-04-07 RX ORDER — GABAPENTIN 300 MG/1
300 CAPSULE ORAL 3 TIMES DAILY
Status: DISCONTINUED | OUTPATIENT
Start: 2025-04-08 | End: 2025-04-14 | Stop reason: HOSPADM

## 2025-04-07 RX ORDER — ONDANSETRON 4 MG/1
4 TABLET, ORALLY DISINTEGRATING ORAL EVERY 6 HOURS PRN
Status: DISCONTINUED | OUTPATIENT
Start: 2025-04-07 | End: 2025-04-14 | Stop reason: HOSPADM

## 2025-04-07 RX ORDER — GUAIFENESIN/DEXTROMETHORPHAN 100-10MG/5
10 SYRUP ORAL EVERY 4 HOURS PRN
Status: DISCONTINUED | OUTPATIENT
Start: 2025-04-07 | End: 2025-04-14 | Stop reason: HOSPADM

## 2025-04-07 RX ORDER — HYDROMORPHONE HYDROCHLORIDE 1 MG/ML
0.5 INJECTION, SOLUTION INTRAMUSCULAR; INTRAVENOUS; SUBCUTANEOUS EVERY 4 HOURS PRN
Status: DISCONTINUED | OUTPATIENT
Start: 2025-04-07 | End: 2025-04-07

## 2025-04-07 RX ORDER — NICOTINE 21 MG/24HR
1 PATCH, TRANSDERMAL 24 HOURS TRANSDERMAL DAILY
Status: DISCONTINUED | OUTPATIENT
Start: 2025-04-07 | End: 2025-04-11

## 2025-04-07 RX ORDER — METHOCARBAMOL 500 MG/1
1000 TABLET, FILM COATED ORAL 3 TIMES DAILY PRN
Status: DISCONTINUED | OUTPATIENT
Start: 2025-04-07 | End: 2025-04-07

## 2025-04-07 RX ORDER — QUETIAPINE FUMARATE 25 MG/1
25-50 TABLET, FILM COATED ORAL 2 TIMES DAILY PRN
Status: DISCONTINUED | OUTPATIENT
Start: 2025-04-07 | End: 2025-04-14 | Stop reason: HOSPADM

## 2025-04-07 RX ORDER — AMOXICILLIN 250 MG
2 CAPSULE ORAL 2 TIMES DAILY
Status: DISCONTINUED | OUTPATIENT
Start: 2025-04-07 | End: 2025-04-14 | Stop reason: HOSPADM

## 2025-04-07 RX ORDER — AMOXICILLIN 250 MG
2 CAPSULE ORAL 2 TIMES DAILY PRN
Status: DISCONTINUED | OUTPATIENT
Start: 2025-04-07 | End: 2025-04-14 | Stop reason: HOSPADM

## 2025-04-07 RX ORDER — HYDROMORPHONE HYDROCHLORIDE 1 MG/ML
0.5 INJECTION, SOLUTION INTRAMUSCULAR; INTRAVENOUS; SUBCUTANEOUS ONCE
Status: COMPLETED | OUTPATIENT
Start: 2025-04-07 | End: 2025-04-07

## 2025-04-07 RX ORDER — CALCIUM CARBONATE 500 MG/1
1000 TABLET, CHEWABLE ORAL 4 TIMES DAILY PRN
Status: DISCONTINUED | OUTPATIENT
Start: 2025-04-07 | End: 2025-04-14 | Stop reason: HOSPADM

## 2025-04-07 RX ORDER — METHOCARBAMOL 500 MG/1
1000 TABLET, FILM COATED ORAL 3 TIMES DAILY
Status: DISCONTINUED | OUTPATIENT
Start: 2025-04-07 | End: 2025-04-14

## 2025-04-07 RX ADMIN — METHOCARBAMOL 1000 MG: 500 TABLET ORAL at 03:05

## 2025-04-07 RX ADMIN — SENNOSIDES AND DOCUSATE SODIUM 1 TABLET: 50; 8.6 TABLET ORAL at 21:51

## 2025-04-07 RX ADMIN — GABAPENTIN 900 MG: 300 CAPSULE ORAL at 08:49

## 2025-04-07 RX ADMIN — HYDROMORPHONE HYDROCHLORIDE 0.5 MG: 1 INJECTION, SOLUTION INTRAMUSCULAR; INTRAVENOUS; SUBCUTANEOUS at 00:43

## 2025-04-07 RX ADMIN — HYDROMORPHONE HYDROCHLORIDE 0.5 MG: 1 INJECTION, SOLUTION INTRAMUSCULAR; INTRAVENOUS; SUBCUTANEOUS at 13:44

## 2025-04-07 RX ADMIN — QUETIAPINE FUMARATE 500 MG: 400 TABLET ORAL at 21:50

## 2025-04-07 RX ADMIN — OXYCODONE HYDROCHLORIDE 10 MG: 10 TABLET ORAL at 21:49

## 2025-04-07 RX ADMIN — SERTRALINE HYDROCHLORIDE 50 MG: 25 TABLET ORAL at 08:49

## 2025-04-07 RX ADMIN — OXYCODONE HYDROCHLORIDE 10 MG: 10 TABLET ORAL at 07:02

## 2025-04-07 RX ADMIN — SENNOSIDES AND DOCUSATE SODIUM 2 TABLET: 50; 8.6 TABLET ORAL at 08:50

## 2025-04-07 RX ADMIN — PROCHLORPERAZINE MALEATE 10 MG: 5 TABLET ORAL at 15:04

## 2025-04-07 RX ADMIN — Medication 25 MCG: at 08:49

## 2025-04-07 RX ADMIN — METHOCARBAMOL 1000 MG: 500 TABLET ORAL at 08:49

## 2025-04-07 RX ADMIN — HYDROMORPHONE HYDROCHLORIDE 0.5 MG: 1 INJECTION, SOLUTION INTRAMUSCULAR; INTRAVENOUS; SUBCUTANEOUS at 10:36

## 2025-04-07 RX ADMIN — PANTOPRAZOLE SODIUM 40 MG: 40 TABLET, DELAYED RELEASE ORAL at 08:51

## 2025-04-07 RX ADMIN — HYDROMORPHONE HYDROCHLORIDE 0.5 MG: 1 INJECTION, SOLUTION INTRAMUSCULAR; INTRAVENOUS; SUBCUTANEOUS at 23:07

## 2025-04-07 RX ADMIN — OXYCODONE HYDROCHLORIDE 10 MG: 10 TABLET ORAL at 16:01

## 2025-04-07 RX ADMIN — ACETAMINOPHEN 975 MG: 325 TABLET, FILM COATED ORAL at 04:27

## 2025-04-07 RX ADMIN — DICLOFENAC SODIUM 2 G: 10 GEL TOPICAL at 13:17

## 2025-04-07 RX ADMIN — DICLOFENAC SODIUM 2 G: 10 GEL TOPICAL at 05:37

## 2025-04-07 RX ADMIN — NICOTINE 1 PATCH: 14 PATCH, EXTENDED RELEASE TRANSDERMAL at 10:16

## 2025-04-07 RX ADMIN — LIDOCAINE 4% 3 PATCH: 40 PATCH TOPICAL at 09:00

## 2025-04-07 RX ADMIN — METHOCARBAMOL 1000 MG: 500 TABLET ORAL at 19:43

## 2025-04-07 RX ADMIN — METHOCARBAMOL 1000 MG: 500 TABLET ORAL at 13:44

## 2025-04-07 RX ADMIN — Medication 5 MG: at 03:05

## 2025-04-07 RX ADMIN — ACETAMINOPHEN 975 MG: 325 TABLET, FILM COATED ORAL at 21:49

## 2025-04-07 RX ADMIN — ONDANSETRON 4 MG: 4 TABLET, ORALLY DISINTEGRATING ORAL at 14:20

## 2025-04-07 RX ADMIN — OXYCODONE HYDROCHLORIDE 5 MG: 5 TABLET ORAL at 12:11

## 2025-04-07 RX ADMIN — OXYCODONE HYDROCHLORIDE 10 MG: 10 TABLET ORAL at 03:05

## 2025-04-07 RX ADMIN — OXYCODONE HYDROCHLORIDE 10 MG: 10 TABLET ORAL at 01:32

## 2025-04-07 RX ADMIN — ACETAMINOPHEN 975 MG: 325 TABLET, FILM COATED ORAL at 10:16

## 2025-04-07 RX ADMIN — HYDROXYZINE HYDROCHLORIDE 25 MG: 25 TABLET, FILM COATED ORAL at 19:43

## 2025-04-07 RX ADMIN — DICLOFENAC SODIUM 2 G: 10 GEL TOPICAL at 16:01

## 2025-04-07 RX ADMIN — ABEMACICLIB 100 MG: 100 TABLET ORAL at 22:04

## 2025-04-07 RX ADMIN — RIVAROXABAN 20 MG: 20 TABLET, FILM COATED ORAL at 16:01

## 2025-04-07 RX ADMIN — PANTOPRAZOLE SODIUM 40 MG: 40 TABLET, DELAYED RELEASE ORAL at 15:51

## 2025-04-07 RX ADMIN — ACETAMINOPHEN 975 MG: 325 TABLET, FILM COATED ORAL at 16:00

## 2025-04-07 RX ADMIN — DICLOFENAC SODIUM 2 G: 10 GEL TOPICAL at 19:43

## 2025-04-07 ASSESSMENT — ACTIVITIES OF DAILY LIVING (ADL)
ADLS_ACUITY_SCORE: 51
ADLS_ACUITY_SCORE: 42
ADLS_ACUITY_SCORE: 51
ADLS_ACUITY_SCORE: 46
ADLS_ACUITY_SCORE: 51
ADLS_ACUITY_SCORE: 46
ADLS_ACUITY_SCORE: 51
ADLS_ACUITY_SCORE: 46
ADLS_ACUITY_SCORE: 47
ADLS_ACUITY_SCORE: 47
ADLS_ACUITY_SCORE: 51
ADLS_ACUITY_SCORE: 47
ADLS_ACUITY_SCORE: 51

## 2025-04-07 NOTE — ED PROVIDER NOTES
ED Provider Note  [facility] EMERGENCY DEPARTMENT ([facility])    4/06/25       ED PROVIDER NOTE     History   No chief complaint on file.    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  Patient was initially diagnosed with cancer in 2020, and did have radiation to the lumbar spine in late [id number].  She is currently on Verzenio for treatment.  She has multiple known bony mets including left sacroiliac region, T7 vertebrae, right femoral head and right sixth rib.  She currently is taking oxycodone for pain, she is prescribed 5 mg, she states that her palliative care physician has told her to take 5 to 10 mg as needed.  Patient admits that she is taking 3 or 4 tablets at a time due to severe pain.  She recently ran out of her oxycodone last evening, she did notify her clinic to refill this but currently is out.  Patient states that she has been told to notify her palliative care clinic about a week ahead of time before she needs a refill, but was not able to do so.  Now she is coming in with worsening right low back pain which has been getting worse over the past few days.  Denies radiation of pain down the buttock into the leg.  She does report some cramping/muscle spasms around the right hip and upper thigh which is an ongoing issue for her.  She does take methocarbamol for this.  She denies any difficulty controlling bowel or bladder, denies any dysuria or hematuria.  No recent falls or trauma.  Denies any fevers or chills, nasal congestion or sore throat, no cough, no chest pain.  She is reporting shortness of breath with relatively minimal exertion such as simply taking a shower or drying off.  This has been an ongoing issue but is getting a little bit worse.  She  endorses quite a bit of fatigue.  Denies abdominal pain, nausea, vomiting, or diarrhea.  Patient does have a previous history of DVTs and PEs and currently is on Xarelto, states that she has not missed any of her doses.  Chart lists an allergy to contrast dye.      This part of the medical record was transcribed by Matthew Condon Medical Scribe, from a dictation done by Neema Baldwin MD.      Past Medical History  Past Medical History:   Diagnosis Date    Anxiety     Breast CA (H) 12/2020    Depression     DVT (deep venous thrombosis) (H) 2014    Left breast mass     x approximately 4-5 months    Pulmonary emboli (H)     Pyelonephritis     Schizoaffective disorder (H)     Tobacco use      Past Surgical History:   Procedure Laterality Date    COLONOSCOPY N/A 7/8/2022    Procedure: COLONOSCOPY, WITH POLYPECTOMY;  Surgeon: Ham Cano MD;  [place] [facility] [place]    ESOPHAGOSCOPY, GASTROSCOPY, DUODENOSCOPY (EGD), COMBINED N/A 3/7/2025    Procedure: ESOPHAGOGASTRODUODENOSCOPY, WITH BIOPSY;  Surgeon: Nguyen Holliday MD;  [place] [facility] [place]    IR LUMBAR KYPHOPLASTY VERTEBRAE  5/17/2021    LAPAROSCOPIC SALPINGO-OOPHORECTOMY Bilateral 8/20/2021    Procedure: BILATERAL SALPINGO-OOPHORECTOMY, LAPAROSCOPIC;  Surgeon: Rory Lopez MD;  [place] [facility] [place]    TUBAL LIGATION  1998     No current outpatient medications on file.    Allergies   Allergen Reactions    Contrast Dye      Pt developed nausea after isovue 370 injection on 6/9/21      Family History  Family History   Problem Relation Age of Onset    Lung Cancer Mother     Lung Cancer Father     Lupus Brother     Kidney Disease Brother     Diabetes Daughter     Asthma Son     Cardiovascular Maternal Aunt     Hypertension Other     Diabetes Other     Deep Vein Thrombosis (DVT) No family hx of      Social History   Social History     Tobacco Use    Smoking status: Some Days     Current packs/day: 0.25     Average packs/day: 0.3 packs/day for 13.9 years (3.5  ttl pk-yrs)     Types: Cigarettes     Start date: 5/13/2011     Passive exposure: Current    Smokeless tobacco: Never    Tobacco comments:     4-5 per day   Vaping Use    Vaping status: Never Used   Substance Use Topics    Alcohol use: Not Currently     Comment: occ    Drug use: Yes     Types: Marijuana     Comment: occ      A medically appropriate review of systems was performed with pertinent positives and negatives noted in the HPI, and all other systems negative.    Physical Exam   BP: 131/89  Pulse: 91  Temp: 98  F (36.7  C)  Resp: 20  Height: 182.9 cm (6')  Weight: 108.9 kg (240 lb)  SpO2: 98 %  Physical Exam  Vitals and nursing note reviewed.   Constitutional:       General: She is in acute distress.      Appearance: She is not diaphoretic.      Comments: Adult female, sitting in wheelchair, alert, cooperative, tearful, distressed   HENT:      Head: Atraumatic.      Mouth/Throat:      Mouth: Mucous membranes are moist.      Pharynx: Oropharynx is clear. No oropharyngeal exudate.   Eyes:      General: No scleral icterus.     Pupils: Pupils are equal, round, and reactive to light.   Cardiovascular:      Rate and Rhythm: Normal rate.      Pulses: Normal pulses.      Heart sounds: Normal heart sounds. No murmur heard.  Pulmonary:      Effort: No respiratory distress.      Breath sounds: Normal breath sounds.   Abdominal:      General: Bowel sounds are normal.      Palpations: Abdomen is soft.      Tenderness: There is no abdominal tenderness.   Musculoskeletal:         General: Tenderness present.      Comments: TTP over low lumbar spine and R paraspinous region and R pelvic region    Palpable muscle spasm over lateral right hip and upper thigh with extension at knee   Skin:     General: Skin is warm.      Findings: No rash.   Neurological:      Mental Status: She is alert.      Comments: Normal plantarflexion and dorsiflexion, able to extend at the hip and knee.  Sensation intact         ED Course, Procedures, &  Data      Procedures            EKG Interpretation:      Interpreted by Neema Baldwin MD  Time reviewed:2305   Symptoms at time of EKG: SOB   Rhythm: normal sinus   Rate: normal  Axis: NORMAL  Ectopy: none  Conduction: normal  ST Segments/ T Waves: No ST-T wave changes  Q Waves: none  Comparison to prior: No old EKG available    Clinical Impression: normal EKG            Results for orders placed or performed during the hospital encounter of 04/06/25   CT Lumbar Spine w/o Contrast     Status: None    Narrative    EXAM: CT LUMBAR SPINE W/O CONTRAST  LOCATION: [facility]  DATE: 4/6/2025    INDICATION: Back pain, history of metastatic cancer  COMPARISON: MRI lumbar spine 3/11/2021  TECHNIQUE: Routine CT Lumbar Spine without IV contrast. Multiplanar reformats. Dose reduction techniques were used.     FINDINGS:  VERTEBRA: Normal vertebral body heights and alignment. Mildly heterogeneous increase in sclerosis affecting the L4 vertebral body, consistent with metastatic involvement. Erosive changes in inferior and to a lesser degree superior aspect of L4 without   definite overall significant height loss. These changes are similar to prior MRI, allowing for differences in technique. Interval changes of kyphoplasty with small amount of cement in prevertebral soft tissues. Bilateral fracture of pedicle of L4,   nondisplaced; age indeterminate, not definitely appreciated on the prior exam. Increased sclerosis in superior aspect of L5 on the left with erosive changes, similar to prior; likely also due to underlying metastases however degenerative changes could   have similar appearance. This is similar to prior, allowing for differences in technique. Subtle sclerotic lesions involve T11, T12, L1, L2, L3, and S1; most consistent with metastases. Lesions at T11, T12, and anteriorly at L2 appears new since prior.   Tiny sclerotic lesion left anterior aspect of L3 is likely also  new. Sclerosis is posterior aspect of S1 is likely degenerative.     CANAL/FORAMINA: Mild degenerative changes with mild to moderate canal narrowing at L4-5. Moderate to marked left and moderate right foraminal narrowing L4-5, mild right at L5-S1.    PARASPINAL: 3 cm hypodensity lateral aspect of right kidney, incompletely visualized; indeterminate. It was present previously as well, appearance suggests a cyst.      Impression    IMPRESSION:  1.  Mildly heterogeneous increase in sclerosis affecting the L4 vertebral body, consistent with metastatic involvement.   2.  Erosive changes in inferior and to a lesser degree superior aspect of L4 without definite overall significant height loss.   3.  These changes are similar to prior MRI, allowing for differences in technique.   4.  Interval changes of kyphoplasty at L4 with small amount of cement in prevertebral soft tissues.   5.  Bilateral fracture of pedicle of L4, nondisplaced; age indeterminate, not definitely appreciated on the prior exam.   6.  Increased sclerosis in superior aspect of L5 on the left with erosive changes, similar to prior; likely also due to underlying metastases however degenerative changes could have similar appearance. This is similar to prior, allowing for differences   in technique.   7.  Subtle sclerotic lesions involve T11, T12, L1, L2, L3, and mid S1; most consistent with metastases.   8.  Lesions at T11, T12, and anteriorly at L2 appear new since prior.   9.  Tiny sclerotic lesion left anterior aspect of L3 is likely also new.   10.  Sclerosis is posterior aspect of S1 is likely degenerative.    CT Pelvis Bone wo Contrast     Status: None    Narrative    EXAM: CT PELVIS BONE WO CONTRAST  LOCATION: [facility]  DATE: 4/6/2025    INDICATION: back pain SI pain, hx of metastatic cancer, eval for bony abnormalities mets interval change  COMPARISON: CT chest abdomen pelvis 10/05/2020. No recent  comparison CTs available.  TECHNIQUE: CT scan of the pelvis was performed without IV contrast. Multiplanar reformats were obtained. Dose reduction techniques were used.  CONTRAST: None.    FINDINGS:    PELVIS ORGANS: There are multiple calcifications within the visualized inferior vena cava, including a large calcification in the right side of the IVC which fills greater than 50% of the IVC luminal diameter. No pelvic lymphadenopathy.    Patchy areas of fat stranding in the subcutaneous fat at the lower lateral flanks bilaterally, likely body wall edema/anasarca. These could represent soft tissue contusions if there has been recent trauma.    MUSCULOSKELETAL: Multifocal sclerotic osseous metastatic disease. Largest lesion at the posterior left ilium measuring approximately 4.7 x 1.9 cm, with a thin biopsy tract running through it. Additional large lesion involving the left anterior   acetabulum, which extends into the superior pubic ramus. There are several additional smaller metastases in the bony pelvis, including the right posterior acetabulum, and the left ischial tuberosity. Small lesion in the anterior right femoral head. No   evidence of pathologic fracture.    Please see separate lumbar spine CT for discussion of the lumbar spine.      Impression    IMPRESSION:  1.  Multifocal sclerotic osseous metastatic disease in the bony pelvis. Largest lesions are at the posterior left ilium, and the left acetabulum. The left posterior ilium lesion has been previously biopsied. Additional small lesion in the anterior right   femoral head.   2.  No evidence of pathologic fracture.  3.  Please see separate lumbar spine CT for discussion of the lumbar spine.  4.  Patchy areas of fat stranding in the subcutaneous fat at the lower lateral flanks bilaterally, likely body wall edema/anasarca. These could represent soft tissue contusions if there has been recent trauma.  5.  Incidental finding of calcifications within the  visualized inferior vena cava, including a large calcification in the right side of the IVC which fills greater than 50% of the IVC luminal diameter.    Chest XR,  PA & LAT     Status: None    Narrative    EXAM: XR CHEST 2 VIEWS  LOCATION: [facility]  DATE: 4/6/2025    INDICATION: SOB  COMPARISON: 10/15/2022      Impression    IMPRESSION: Negative chest.   Comprehensive metabolic panel     Status: Abnormal   Result Value Ref Range    Sodium 139 135 - 145 mmol/L    Potassium 4.1 3.4 - 5.3 mmol/L    Carbon Dioxide (CO2) 25 22 - 29 mmol/L    Anion Gap 12 7 - 15 mmol/L    Urea Nitrogen 15.9 6.0 - 20.0 mg/dL    Creatinine 1.30 (H) 0.51 - 0.95 mg/dL    GFR Estimate 50 (L) >60 mL/min/1.73m2    Calcium 10.1 8.8 - 10.4 mg/dL    Chloride 102 98 - 107 mmol/L    Glucose 118 (H) 70 - 99 mg/dL    Alkaline Phosphatase 93 40 - 150 U/L    AST 18 0 - 45 U/L    ALT 10 0 - 50 U/L    Protein Total 7.6 6.4 - 8.3 g/dL    Albumin 3.9 3.5 - 5.2 g/dL    Bilirubin Total 0.2 <=1.2 mg/dL   Troponin T, High Sensitivity     Status: Normal   Result Value Ref Range    Troponin T, High Sensitivity <6 <=14 ng/L   Nt probnp inpatient     Status: Normal   Result Value Ref Range    N terminal Pro BNP Inpatient <36 0 - 450 pg/mL   CBC with platelets and differential     Status: Abnormal   Result Value Ref Range    WBC Count 3.8 (L) 4.0 - 11.0 10e3/uL    RBC Count 3.23 (L) 3.80 - 5.20 10e6/uL    Hemoglobin 10.9 (L) 11.7 - 15.7 g/dL    Hematocrit 33.7 (L) 35.0 - 47.0 %     (H) 78 - 100 fL    MCH 33.7 (H) 26.5 - 33.0 pg    MCHC 32.3 31.5 - 36.5 g/dL    RDW 12.9 10.0 - 15.0 %    Platelet Count 200 150 - 450 10e3/uL    % Neutrophils 54 %    % Lymphocytes 33 %    % Monocytes 7 %    % Eosinophils 3 %    % Basophils 2 %    % Immature Granulocytes 0 %    NRBCs per 100 WBC 0 <1 /100    Absolute Neutrophils 2.0 1.6 - 8.3 10e3/uL    Absolute Lymphocytes 1.3 0.8 - 5.3 10e3/uL    Absolute Monocytes 0.3 0.0 - 1.3  10e3/uL    Absolute Eosinophils 0.1 0.0 - 0.7 10e3/uL    Absolute Basophils 0.1 0.0 - 0.2 10e3/uL    Absolute Immature Granulocytes 0.0 <=0.4 10e3/uL    Absolute NRBCs 0.0 10e3/uL   EKG 12 lead     Status: None (Preliminary result)   Result Value Ref Range    Systolic Blood Pressure  mmHg    Diastolic Blood Pressure  mmHg    Ventricular Rate 74 BPM    Atrial Rate 74 BPM    OR Interval 170 ms    QRS Duration 84 ms     ms    QTc 430 ms    P Axis 60 degrees    R AXIS 38 degrees    T Axis 57 degrees    Interpretation ECG Sinus rhythm  Normal ECG      CBC with Platelets & Differential     Status: Abnormal    Narrative    The following orders were created for panel order CBC with Platelets & Differential.  Procedure                               Abnormality         Status                     ---------                               -----------         ------                     CBC with platelets and ...[[id number]]  Abnormal            Final result                 Please view results for these tests on the individual orders.     Medications   sodium chloride 0.9% BOLUS 500 mL (500 mLs Intravenous $New Bag 4/6/25 2347)   HYDROmorphone (PF) (DILAUDID) injection 0.5 mg (0.5 mg Intravenous $Given 4/6/25 2342)   methocarbamol (ROBAXIN) tablet 1,000 mg (1,000 mg Oral $Given 4/6/25 2342)   HYDROmorphone (PF) (DILAUDID) injection 0.5 mg (0.5 mg Intravenous $Given 4/7/25 0043)   oxyCODONE IR (ROXICODONE) tablet 10 mg (10 mg Oral $Given 4/7/25 0132)     Labs Ordered and Resulted from Time of ED Arrival to Time of ED Departure   COMPREHENSIVE METABOLIC PANEL - Abnormal       Result Value    Sodium 139      Potassium 4.1      Carbon Dioxide (CO2) 25      Anion Gap 12      Urea Nitrogen 15.9      Creatinine 1.30 (*)     GFR Estimate 50 (*)     Calcium 10.1      Chloride 102      Glucose 118 (*)     Alkaline Phosphatase 93      AST 18      ALT 10      Protein Total 7.6      Albumin 3.9      Bilirubin Total 0.2     CBC WITH  PLATELETS AND DIFFERENTIAL - Abnormal    WBC Count 3.8 (*)     RBC Count 3.23 (*)     Hemoglobin 10.9 (*)     Hematocrit 33.7 (*)      (*)     MCH 33.7 (*)     MCHC 32.3      RDW 12.9      Platelet Count 200      % Neutrophils 54      % Lymphocytes 33      % Monocytes 7      % Eosinophils 3      % Basophils 2      % Immature Granulocytes 0      NRBCs per 100 WBC 0      Absolute Neutrophils 2.0      Absolute Lymphocytes 1.3      Absolute Monocytes 0.3      Absolute Eosinophils 0.1      Absolute Basophils 0.1      Absolute Immature Granulocytes 0.0      Absolute NRBCs 0.0     TROPONIN T, HIGH SENSITIVITY - Normal    Troponin T, High Sensitivity <6     NT PROBNP INPATIENT - Normal    N terminal Pro BNP Inpatient <36     ROUTINE UA WITH MICROSCOPIC REFLEX TO CULTURE     CT Pelvis Bone wo Contrast   Final Result   IMPRESSION:   1.  Multifocal sclerotic osseous metastatic disease in the bony pelvis. Largest lesions are at the posterior left ilium, and the left acetabulum. The left posterior ilium lesion has been previously biopsied. Additional small lesion in the anterior right    femoral head.    2.  No evidence of pathologic fracture.   3.  Please see separate lumbar spine CT for discussion of the lumbar spine.   4.  Patchy areas of fat stranding in the subcutaneous fat at the lower lateral flanks bilaterally, likely body wall edema/anasarca. These could represent soft tissue contusions if there has been recent trauma.   5.  Incidental finding of calcifications within the visualized inferior vena cava, including a large calcification in the right side of the IVC which fills greater than 50% of the IVC luminal diameter.       Chest XR,  PA & LAT   Final Result   IMPRESSION: Negative chest.      CT Lumbar Spine w/o Contrast   Final Result   IMPRESSION:   1.  Mildly heterogeneous increase in sclerosis affecting the L4 vertebral body, consistent with metastatic involvement.    2.  Erosive changes in inferior and to a  lesser degree superior aspect of L4 without definite overall significant height loss.    3.  These changes are similar to prior MRI, allowing for differences in technique.    4.  Interval changes of kyphoplasty at L4 with small amount of cement in prevertebral soft tissues.    5.  Bilateral fracture of pedicle of L4, nondisplaced; age indeterminate, not definitely appreciated on the prior exam.    6.  Increased sclerosis in superior aspect of L5 on the left with erosive changes, similar to prior; likely also due to underlying metastases however degenerative changes could have similar appearance. This is similar to prior, allowing for differences    in technique.    7.  Subtle sclerotic lesions involve T11, T12, L1, L2, L3, and mid S1; most consistent with metastases.    8.  Lesions at T11, T12, and anteriorly at L2 appear new since prior.    9.  Tiny sclerotic lesion left anterior aspect of L3 is likely also new.    10.  Sclerosis is posterior aspect of S1 is likely degenerative.              Critical care was not performed.     Medical Decision Making  The patient's presentation was of high complexity (a chronic illness severe exacerbation, progression, or side effect of treatment).    The patient's evaluation involved:  review of external note(s) from 3+ sources (see separate area of note for details)  review of 3+ test result(s) ordered prior to this encounter (see separate area of note for details)  ordering and/or review of 3+ test(s) in this encounter (see separate area of note for details)  discussion of management or test interpretation with another health professional (see separate area of note for details)    The patient's management necessitated high risk (a decision regarding hospitalization).    Assessment & Plan    Patient presents for the above complaints.  On my evaluation she is alert, cooperative, very uncomfortable, tearful.  Her vital signs are normal.  She is tender to palpation over the lower  lumbar spine and extending to the right parasinus regions as well.    With regard to her back pain, it is certainly could be her known bony mets, progression of disease, or a reflection of the fact that she is out of her oxycodone.  Infectious etiology I think is far less likely.  With regard to her shortness of breath, this could be due to cardiac etiology, chemotherapy effects, cancer effects, pleural effusion, PE, or other.    We did establish IV access and we did draw blood for laboratory analysis.  CBC shows a white count of 3.8, hemoglobin 10.9, normal platelet count, this is all consistent with previous CMP demonstrates creatinine of 1.3, up a little bit from 1.08 11 days ago.  Electrolytes normal., troponin is negative, BNP is negative, UA is currently pending collection.  Chest x-ray was read as clear, EKG shows normal sinus rhythm without any sign of ectopy or ischemia.  Consideration was given to doing chest CT PE protocol but that would require pretreatment for the 6-hour protocol required by radiology.  I think it is relatively unlikely that she would have a PE if she has been compliant with her Xarelto, she is saturating 98% on room air and heart rate is 91.      We did elect to do a lumbar spine and pelvis bone CT scan which demonstrates multiple findings.  There is a mildly heterogeneous increase in sclerosis affecting the L4 vertebral body consistent with metastatic involvement.  There are also erosive changes in the inferior and to a lesser degree superior aspect of L4 without definite overall significant height loss.  There are bilateral fractures of the pedicle of L4 which are nondisplaced and age indeterminant, increased sclerosis of the superior aspect of L5 on the left with erosive changes, similar to prior but also due to overlying metastases though degenerative changes could have the same effect.  There are sclerotic lesions involving T11, T12, L1, L2, L3, and S1 consistent with metastases,  and new lesions at T11, T12, and L1-2 that are new compared to previous.  There is also a small lesion of the left anterior aspect of L3 which is new and sclerosis of the posterior aspect of S1 which is likely degenerative.    Patient was given multiple doses of Dilaudid for pain here in the emergency department as well as methocarbamol.  This was ineffective.  We will order additional oxycodone for her, she will need to be admitted to the hospital at this time for pain control with worsening skeletal metastases.    Patient was given a dose of Dilaudid as well as methocarbamol and IV fluids here in the emergency department.      I have reviewed the nursing notes. I have reviewed the findings, diagnosis, plan and need for follow up with the patient.    Current Discharge Medication List          Final diagnoses:   Uncontrolled pain   Carcinoma of breast metastatic to bone, unspecified laterality (H)   I, Matthew Condon, am serving as a trained medical scribe to document services personally performed by Neema Baldwin MD, based on the provider's statements to me.     I, Neema Baldwin MD, was physically present and have reviewed and verified the accuracy of this note documented by Matthew Condon.     Neema Baldwin MD  [facility] EMERGENCY DEPARTMENT  4/6/2025     Neema Baldwin MD  04/07/25 0204       Neema Baldwin MD  04/07/25 0205

## 2025-04-07 NOTE — H&P
[facility]    History and Physical - Medicine Service, [facility] TEAM        Date of Admission:  4/6/2025    Assessment & Plan      Teresa Sanderson is a 49 year old female admitted on 4/6/2025. She has h/o metastatic breast cancer diagnosed in 2020 s/p radiation and chemotherapy. Currently on chemotherapy. Presents with increased low back pain.     #Breast cancer with bony metastases  #Uncontrolled metastatic pain  Reports having increased pain in her lower back and particularly in her R hip over the last 3-4 days. Notes that the pain radiates down the front of her leg and she has some muscle spasms on that side. Denies saddle anesthesia, bowel/bladder incontinence, weakness, or falls. Has been taking medications differently from how they are prescribed. Will be taking 3-4 of her Oxycodone 5mg at a time and then skip other days and take large amounts of Tylenol/Aleve/Ibuprofen to stretch out her prescription. Ran out of her oxycodone on 4/5. Has been unable to fill her Robaxin prescription. Has tried lidocaine patches and hot packs in the past without relief. CT L spine and pelvis today showing increased mets without pathologic fracture.   - Continue Abemaciclib 100mg BID  - MRI T and L spine  - Oncology consult   - Palliative consult  - Scheduled and PRN bowel regimen while on opioids    Pain regimen:  - Lidocaine Patch  - Tylenol 975mg q6hr  - Voltaren gel QID  - Gabapentin 900mg TID  - Robaxin 1g TID  - PRN Oxycodone 5-10mg q4hr   - PRN Dilaudid IV 0.5mg q2hr  - PRN Hydroxyzine 25mg QID    #H/o DVT and PE  #SOB  Reported exacerbation of chronic SEGOVIA to ED provider but on my assessment  states that her breathing has overall been ok. Does occasionally get SOB with activity but able to complete all of her daily tasks. Negative BNP, troponin, CXR. Reassuring lung exam. CT PE not performed in ED because of reported contrast allergy and D-dimer not obtained as results  would be confounded by cancer diagnosis. Reports taking her Xarelto every night without missing any doses.   - BLE US  - Continue Xarelto    #FELTON on CKD  Baseline Cr of 1.1. Cr on admission of 1.3. CKD appears to be due to Abemaciclib per Oncology. Inconsistent report of her PO intake, reports good intake but only eating once a day.   - CTM    #Schizoaffective disorder with Bipolar features  #Anxiety/Depression  Has had increased racing thoughts and health anxiety recently with her progression in disease.   - Psychology consult  - PTA Seroquel 500mg at bedtime (Although pt reports taking 600-700mg)  - PTA Sertraline 50mg (pt reports intermittently taking)  - PTA Hydroxyzine PRN (pt reports not taking)    #GERD  - PTA Protonix 40mg BID    #IVC calcification  Incidental finding of calcifications within the visualized inferior vena cava, including a large calcification in the right side of the IVC which fills greater than 50% of the IVC luminal diameter. Unknown if this was present on prior imaging.     Diet:  Regular  DVT Prophylaxis: DOAC  Crisostomo Catheter: Not present  Fluids: PO  Lines: None     Cardiac Monitoring: None  Code Status: Full Code      Clinically Significant Risk Factors Present on Admission                # Drug Induced Coagulation Defect: home medication list includes an anticoagulant medication         # Anemia: based on hgb <11       # Obesity: Estimated body mass index is 32.55 kg/m  as calculated from the following:    Height as of this encounter: 1.829 m (6').    Weight as of this encounter: 108.9 kg (240 lb).              Disposition Plan      Expected Discharge Date: 04/09/2025                To be formally staffed in     Jonny Berman MD  Medicine Service, [facility]  Securely message with [employer] (more info)  Text page via [facility] Paging/Directory   See signed in provider for up to date coverage  information  _____________________________________________________________________    Chief Complaint   Low back pain    History is obtained from the patient and chart review    History of Present Illness   Teresa Sanderson is a 49 year old female who has a h/o DVT/PE (on Xarelto), metastatic breast cancer, and Schizoaffective disorder who presents for uncontrolled back pain.     She was diagnosed with metastatic breast cancer in 2020. Has since undergone radiation and multiple courses of treatment. Notable mets to bony pelvis, T spine, and L spine. Most recent surveillance PET/CT on 2/3/24 showed progression of bony mets, this was in the context of pt not taking her chemotherapy medications. States that since her last appointment with them she has been taking her chemotherapy as prescribed.     States that she has been taking her pain medications differently than prescribed for >1 month. Has been taking 3-4 of her Oxycodone 5mg at a time and then skip other days and take large amounts of Tylenol/Aleve/Ibuprofen to stretch out her prescription. Ran out of her oxycodone on 4/5. Has been unable to fill her Robaxin prescription but states that when she was taking it she did get some relief. Reports having increased pain in her lower back and particularly in her R hip over the last 3-4 days. Notes that the pain radiates down the front of her leg and she has some muscle spasms on that side. Denies saddle anesthesia, bowel/bladder incontinence, weakness, trauma, or falls.    Notes some SEGOVIA recently although unchanged from baseline. Reports taking her Xarelto every night without missing doses. Has not had any LE pain/edema. States that with her prior PE she had pain in her upper back which is not present now.     Past Medical History    Past Medical History:   Diagnosis Date    Anxiety     Breast CA (H) 12/2020    Depression     DVT (deep venous thrombosis) (H) 2014    Left breast mass     x approximately 4-5 months     Pulmonary emboli (H)     Pyelonephritis     Schizoaffective disorder (H)     Tobacco use        Past Surgical History   Past Surgical History:   Procedure Laterality Date    COLONOSCOPY N/A 7/8/2022    Procedure: COLONOSCOPY, WITH POLYPECTOMY;  Surgeon: Ham Cano MD;  Location: [facility] OR    ESOPHAGOSCOPY, GASTROSCOPY, DUODENOSCOPY (EGD), COMBINED N/A 3/7/2025    Procedure: ESOPHAGOGASTRODUODENOSCOPY, WITH BIOPSY;  Surgeon: Nguyen Holliday MD;  [place] [facility] [place]    IR LUMBAR KYPHOPLASTY VERTEBRAE  5/17/2021    LAPAROSCOPIC SALPINGO-OOPHORECTOMY Bilateral 8/20/2021    Procedure: BILATERAL SALPINGO-OOPHORECTOMY, LAPAROSCOPIC;  Surgeon: Rory Lopez MD;  Location: [facility] OR    TUBAL LIGATION  1998       Prior to Admission Medications   Prior to Admission Medications   Prescriptions Last Dose Informant Patient Reported? Taking?   OLANZapine (ZYPREXA) 5 MG tablet 4/5/2025 Bedtime  No Yes   Sig: Take 1-2 tablets (5-10 mg) by mouth nightly as needed for sleep. Take 1/2 tablet (2.5mg) daily as needed for hypomania/ tom.   Patient taking differently: at bedtime. Take 1-2 tablets (5-10 mg) by mouth nightly as needed for sleep. Take 1/2 tablet (2.5mg) daily as needed for hypomania/ tom.   QUEtiapine (SEROQUEL) 100 MG tablet 4/5/2025  No Yes   Sig: Tale 1 tablet (100mg) with 1 tablet (400mg) for total of 500mg by mouth at bedtime   QUEtiapine (SEROQUEL) 400 MG tablet 4/5/2025  No Yes   Sig: Take 1 tablet (400mg) with 1 tablet (100mg) for total of 500mg by mouth at bedtime   QUEtiapine (SEROQUEL) 50 MG tablet 4/5/2025  No Yes   Sig: Take 0.5-1 tablets (25-50 mg) by mouth 2 times daily as needed (for sleep, mood and anxiety).   Rivaroxaban ANTICOAGULANT 15 & 20 MG TBPK Starter Therapy Pack   No No   Sig: Take 15 mg by mouth 2 times daily (with meals) for 21 days, THEN 20 mg daily with food for 9 days.   Vitamin D3 (CHOLECALCIFEROL) 25 mcg (1000 units) tablet Unknown  No Yes   Sig: Take 1 tablet (25 mcg) by  mouth daily.   abemaciclib (VERZENIO) 100 MG tablet 2025  No Yes   Sig: Take 1 tablet (100 mg) by mouth 2 times daily for 28 days   gabapentin (NEURONTIN) 300 MG capsule 2025  No Yes   Sig: Take 3 capsules (900 mg) by mouth 3 times daily.   hydrOXYzine HCl (ATARAX) 25 MG tablet 2025  No Yes   Sig: Take 1 tablet (25 mg) by mouth 4 times daily as needed for anxiety or other (panic).   methocarbamol (ROBAXIN) 500 MG tablet Unknown  No Yes   Sig: Take 2-3 tablets (1,000-1,500 mg) by mouth 3 times daily as needed for muscle spasms.   methylPREDNISolone (MEDROL) 32 MG tablet Unknown  No No   Si hours prior to scan and 2 hours prior to scan   Patient taking differently: as needed. 12 hours prior to scan and 2 hours prior to scan   naloxone (NARCAN) 4 MG/0.1ML nasal spray   No No   Sig: Spray 1 spray (4 mg) into one nostril alternating nostrils as needed for opioid reversal. every 2-3 minutes until assistance arrives   omeprazole (PRILOSEC) 40 MG DR capsule 2025  No Yes   Sig: Take 1 capsule (40 mg) by mouth 2 times daily.   oxyCODONE (ROXICODONE) 5 MG tablet Unknown  No No   Sig: Take 1-2 tablets (5-10 mg) by mouth every 4 hours as needed for pain.   rivaroxaban ANTICOAGULANT (XARELTO) 20 MG TABS tablet 2025 Bedtime  No Yes   Sig: Take 1 tablet (20 mg) by mouth daily (with dinner).   sertraline (ZOLOFT) 100 MG tablet 2025  No Yes   Sig: Take 0.5 tablets (50 mg) by mouth daily.      Facility-Administered Medications: None         Physical Exam   Vital Signs: Temp: 98.6  F (37  C) Temp src: Oral BP: (!) 151/100 Pulse: 78   Resp: 20 SpO2: 99 % O2 Device: None (Room air)    Weight: 240 lbs 0 oz    General: Alert. Sitting in bed comfortably. NAD. Well developed and nourished.  HEENT: Paracervical muscular tenderness. No midline tenderness. NC, AT, MMM, EOMI, Clear conjunctivae, normal oropharynx  Cardiovascular: RRR, No murmurs, rubs, or gallops. Normal S1 and S2.  Pulmonary: CTAB, No  wheezes/rhonchi/rales, Normal inspiratory effort  Abdominal: Somewhat tender across upper abd. Soft, non-distended. Normal bowel sounds.  Extremities: No swelling or erythema. FAROM.   Back: Midline and paraspinal muscular tenderness from ~T5 down through the remainder of the spine. Pain is worse in R hip and   Skin: Clean, warm, dry, intact  Neuro: 5/5 strength in RLE. 4/5 strength in LLE. 5/5  dorsiflexion and plantarflexion. A&Ox4, No dysarthria, CN not tested but appear grossly intact. Moving all extremities.   Psych: Pressured speech.     Data     I have personally reviewed the following data over the past 24 hrs:    3.7 (L)  \   10.2 (L)   / 195     139 104 15.5 /  133 (H)   3.8 23 1.17 (H) \     ALT: 9 AST: 16 AP: 86 TBILI: 0.2   ALB: 3.8 TOT PROTEIN: 7.3 LIPASE: N/A     Trop: <6 BNP: <36       Imaging results reviewed over the past 24 hrs:   Recent Results (from the past 24 hours)   CT Lumbar Spine w/o Contrast    Narrative    EXAM: CT LUMBAR SPINE W/O CONTRAST  LOCATION: [facility]  DATE: 4/6/2025    INDICATION: Back pain, history of metastatic cancer  COMPARISON: MRI lumbar spine 3/11/2021  TECHNIQUE: Routine CT Lumbar Spine without IV contrast. Multiplanar reformats. Dose reduction techniques were used.     FINDINGS:  VERTEBRA: Normal vertebral body heights and alignment. Mildly heterogeneous increase in sclerosis affecting the L4 vertebral body, consistent with metastatic involvement. Erosive changes in inferior and to a lesser degree superior aspect of L4 without   definite overall significant height loss. These changes are similar to prior MRI, allowing for differences in technique. Interval changes of kyphoplasty with small amount of cement in prevertebral soft tissues. Bilateral fracture of pedicle of L4,   nondisplaced; age indeterminate, not definitely appreciated on the prior exam. Increased sclerosis in superior aspect of L5 on the left with erosive  changes, similar to prior; likely also due to underlying metastases however degenerative changes could   have similar appearance. This is similar to prior, allowing for differences in technique. Subtle sclerotic lesions involve T11, T12, L1, L2, L3, and S1; most consistent with metastases. Lesions at T11, T12, and anteriorly at L2 appears new since prior.   Tiny sclerotic lesion left anterior aspect of L3 is likely also new. Sclerosis is posterior aspect of S1 is likely degenerative.     CANAL/FORAMINA: Mild degenerative changes with mild to moderate canal narrowing at L4-5. Moderate to marked left and moderate right foraminal narrowing L4-5, mild right at L5-S1.    PARASPINAL: 3 cm hypodensity lateral aspect of right kidney, incompletely visualized; indeterminate. It was present previously as well, appearance suggests a cyst.      Impression    IMPRESSION:  1.  Mildly heterogeneous increase in sclerosis affecting the L4 vertebral body, consistent with metastatic involvement.   2.  Erosive changes in inferior and to a lesser degree superior aspect of L4 without definite overall significant height loss.   3.  These changes are similar to prior MRI, allowing for differences in technique.   4.  Interval changes of kyphoplasty at L4 with small amount of cement in prevertebral soft tissues.   5.  Bilateral fracture of pedicle of L4, nondisplaced; age indeterminate, not definitely appreciated on the prior exam.   6.  Increased sclerosis in superior aspect of L5 on the left with erosive changes, similar to prior; likely also due to underlying metastases however degenerative changes could have similar appearance. This is similar to prior, allowing for differences   in technique.   7.  Subtle sclerotic lesions involve T11, T12, L1, L2, L3, and mid S1; most consistent with metastases.   8.  Lesions at T11, T12, and anteriorly at L2 appear new since prior.   9.  Tiny sclerotic lesion left anterior aspect of L3 is likely also  new.   10.  Sclerosis is posterior aspect of S1 is likely degenerative.    Chest XR,  PA & LAT    Narrative    EXAM: XR CHEST 2 VIEWS  LOCATION: [facility]  DATE: 4/6/2025    INDICATION: SOB  COMPARISON: 10/15/2022      Impression    IMPRESSION: Negative chest.   CT Pelvis Bone wo Contrast    Narrative    EXAM: CT PELVIS BONE WO CONTRAST  LOCATION: [facility]  DATE: 4/6/2025    INDICATION: back pain SI pain, hx of metastatic cancer, eval for bony abnormalities mets interval change  COMPARISON: CT chest abdomen pelvis 10/05/2020. No recent comparison CTs available.  TECHNIQUE: CT scan of the pelvis was performed without IV contrast. Multiplanar reformats were obtained. Dose reduction techniques were used.  CONTRAST: None.    FINDINGS:    PELVIS ORGANS: There are multiple calcifications within the visualized inferior vena cava, including a large calcification in the right side of the IVC which fills greater than 50% of the IVC luminal diameter. No pelvic lymphadenopathy.    Patchy areas of fat stranding in the subcutaneous fat at the lower lateral flanks bilaterally, likely body wall edema/anasarca. These could represent soft tissue contusions if there has been recent trauma.    MUSCULOSKELETAL: Multifocal sclerotic osseous metastatic disease. Largest lesion at the posterior left ilium measuring approximately 4.7 x 1.9 cm, with a thin biopsy tract running through it. Additional large lesion involving the left anterior   acetabulum, which extends into the superior pubic ramus. There are several additional smaller metastases in the bony pelvis, including the right posterior acetabulum, and the left ischial tuberosity. Small lesion in the anterior right femoral head. No   evidence of pathologic fracture.    Please see separate lumbar spine CT for discussion of the lumbar spine.      Impression    IMPRESSION:  1.  Multifocal  sclerotic osseous metastatic disease in the bony pelvis. Largest lesions are at the posterior left ilium, and the left acetabulum. The left posterior ilium lesion has been previously biopsied. Additional small lesion in the anterior right   femoral head.   2.  No evidence of pathologic fracture.  3.  Please see separate lumbar spine CT for discussion of the lumbar spine.  4.  Patchy areas of fat stranding in the subcutaneous fat at the lower lateral flanks bilaterally, likely body wall edema/anasarca. These could represent soft tissue contusions if there has been recent trauma.  5.  Incidental finding of calcifications within the visualized inferior vena cava, including a large calcification in the right side of the IVC which fills greater than 50% of the IVC luminal diameter.

## 2025-04-07 NOTE — CONSULTS
[facility] Oncology Consultation    Teresa Sanderson MRN# [id number]   Age: 49 year old YOB: 1975     Date of Admission:  4/6/2025    Home clinic: [facility]  Primary medical oncology provider: Jahaira Plunkett MD          Assessment and Plan:   Assessment:  48 yo female with ER positive, HER2 negative breast cancer metastasized to bones on treatment with abemaciclib and fulvestrant admitted with intractable low back and right hip pain.  Imaging consistent with progressive bone metastases including bilateral pedicle fracture at L4.    Plan:   Metastatic ER positive breast cancer:  CT imaging is c/w progression of numerous bone metastases.  Discussed with the patient need for a change in cancer treatment.  We discussed the choice of next line treatment depends on what mutations are present in the tumor.  We obtained a left ilium biopsy on 2/18/25, however, due to decalcification of the specimen NGS could not be performed.  "[employer] liquid biopsy was drawn on 3/5/2025.  Unfortunately the company tried 5 times over the period of a month to get patient to sign the Advance Beneficiary Notice and were unable to do so, so the test was canceled.  - Recommend [employer] NGS, will discuss ABN with patient tomorrow.  - In the absence of NGS data, I recommend next line treatment with everolimus and fulvestrant.  Last fulvestrant administered on 3/27; therefore next is not due until 4/24.  Everolimus is a pill taking daily.  Will plan to start on discharge.    2.  Bone metastases, cancer associated pain:  - Agree with plan for MRI L-spine  - orthopedic surgery consult given bilateral fracture of pedicle of L4.  Advice regarding whether bracing is indicated and if there should be any activity restrictions.  - agree with palliative medicine consultation for pain control  - Will discuss with radiation oncology whether patient is a candidate for palliative radiation to the lumbar  spine area.  I suspect not, given prior radiation to this area on 2020.  - on once every 3 month Zometa, next due in 3 months.    3.  FELTON:  Likely secondary to abemaciclib.  Known side effect.  - discontinuing abemaciclib as above  - encourage PO intake of fluids.  - avoid NSAIDs until resolved.    4.  Leukopenia/anemia:  Secondary to abemaciclib.  - discontinue abemaciclib as above  - continue to monitor.          Chief Complaint:   Back and hip pain    50 yo female with ER positive, HER2 negative breast cancer metastasized to bones on treatment with abemaciclib and fulvestrant admitted with intractable low back and left hip pain.  Patient with known diffuse bone metastases.  Last outpatient PET/CT on 2/3/2025 noted increased metabolic activity in the left ilium, T7 vertebrae and right 6th rib as well as a new focus in the right femoral head.  There was decreased metabolic activity in the left acetabulum.  Patient noted at the subsequent clinic visit that she was sleeping more and later in the morning and was sometimes missing her morning dose of abemaciclib.  Decision was made to continue on abemaciclib and fulvestrant, ensuring she was taking the abemaciclib twice a day scheduled as prescribed, and to repeat imaging in 2 months.      She notes her pain was manageable and unchanged from prior up until 3-4 days ago.  At that time her pain became acutely worse.  She also developed pain shooting down her thigh.  Pain was such that she couldn't do anything.  She states she lifted a microwave out of a box around that time, but didn't think it to be very heavy.  She took 3-4 rather than 2 oxycodone at a time.  She also was taking tylenol and ibuprofen without relief.  She notes also feeling a hardened vein on the inside of her right elbow and is wondering if she has a clot again.  She confirms that she has been taking her Xarelto.         Cancer Treatment History:   12/23/2020 - 1/7/2021  Radiation (3000 cGy) to the  lumbar spine.  1/29/2021 - 04/2023  Ibrance, zoladex, and anastrozole.  5/17/2021 radiofrequency ablation, kyphoplasty to L4  8/20/2021  Bilateral salpingo-oophorectomies, Ibrance and anastrozole.  She was off anastrozole 12/2021 - 2/2022 and off Ibrance 01/2022 - 02/2022 due to stress and impending homelessness. Off both again 03/2022-04/2022 due to death of her son but restarted in 05/2022.  04/2023  PET/CT with progression in 2 small metastases in the left pelvis.  Palbociclib continued.  Anastrozole changed to exemestane  5/5/2023  Completed radiation, 2000 cGy in 5 fractions, to the left ilium.  12/11/2023 PET/CT with new left breast lesion, new T7 vertebral metastasis and progression of hypermetabolic foci in the bony pelvis c/w disease progression.  Ibrance and exemestane discontinued.  12/19/2023 Left breast biopsy  [employer] NGS:              ER positive, 3+  100%              DE positive, 1+, 5%              HER2 negative.              AR positive, 1+, 35%              MMR proficient              PD-L1 negative, CPS 0              PTEN positive, 1+ 100%  *Of note, all of the above was IHC, DNA quantity not sufficient for NGS  1/18/24 - present  Fulvestrant + abemaciclib.  Abemaciclib dose reduced to 100 mg PO BID due to hand foot syndrome.  7/18/2024  completed radiation to the left acetabulum and the SI         Past Medical History:     Past Medical History:   Diagnosis Date    Anxiety     Breast CA (H) 12/2020    Depression     DVT (deep venous thrombosis) (H) 2014    Left breast mass     x approximately 4-5 months    Pulmonary emboli (H)     Pyelonephritis     Schizoaffective disorder (H)     Tobacco use             Past Surgical History:      Past Surgical History:   Procedure Laterality Date    COLONOSCOPY N/A 7/8/2022    Procedure: COLONOSCOPY, WITH POLYPECTOMY;  Surgeon: Ham Cano MD;  Location: [facility] OR    ESOPHAGOSCOPY, GASTROSCOPY, DUODENOSCOPY (EGD), COMBINED N/A 3/7/2025    Procedure:  ESOPHAGOGASTRODUODENOSCOPY, WITH BIOPSY;  Surgeon: Nguyen Holliday MD;  Location: [facility] OR    IR LUMBAR KYPHOPLASTY VERTEBRAE  5/17/2021    LAPAROSCOPIC SALPINGO-OOPHORECTOMY Bilateral 8/20/2021    Procedure: BILATERAL SALPINGO-OOPHORECTOMY, LAPAROSCOPIC;  Surgeon: Rory Lopez MD;  Location: [facility] OR    TUBAL LIGATION  1998          Allergies:     Allergies   Allergen Reactions    Contrast Dye      Pt developed nausea after isovue 370 injection on 6/9/21             Medications:     Current Facility-Administered Medications   Medication Dose Route Frequency Provider Last Rate Last Admin    abemaciclib (VERZENIO) tablet 100 mg  100 mg Oral BID Jonny Berman MD        acetaminophen (TYLENOL) tablet 975 mg  975 mg Oral TID Anca Wallace MD   975 mg at 04/07/25 1600    artificial saliva (BIOTENE MT) solution 1 spray  1 spray Mouth/Throat Q1H PRN Jonny Berman MD        benzocaine-menthol (CHLORASEPTIC MAX) 15-10 MG lozenge 1 lozenge  1 lozenge Buccal Q1H PRN Jonny Berman MD        bisacodyl (DULCOLAX) suppository 10 mg  10 mg Rectal Daily PRN Jonny Berman MD        calcium carbonate (TUMS) chewable tablet 1,000 mg  1,000 mg Oral 4x Daily PRN Jonny Berman MD        diclofenac (VOLTAREN) 1 % topical gel 2 g  2 g Topical 4x Daily Severson, Hayley, MD   2 g at 04/07/25 1601    [START ON 4/8/2025] gabapentin (NEURONTIN) capsule 300 mg  300 mg Oral TID Anca Wallace MD        guaiFENesin-dextromethorphan (ROBITUSSIN DM) 100-10 MG/5ML syrup 10 mL  10 mL Oral Q4H PRN Jonny Berman MD        HYDROmorphone (PF) (DILAUDID) injection 0.5 mg  0.5 mg Intravenous Q2H PRN Severson, Hayley, MD   0.5 mg at 04/07/25 1344    hydrOXYzine HCl (ATARAX) tablet 25 mg  25 mg Oral 4x Daily PRN Jonny Berman MD        Lidocaine (LIDOCARE) 4 % Patch 3 patch  3 patch Transdermal Q24H Severson, Hayley, MD   3 patch at 04/07/25 0900    lidocaine (LMX4) cream   Topical Q1H PRN Jonny Berman MD         lidocaine 1 % 0.1-1 mL  0.1-1 mL Other Q1H PRN Jonny Berman MD        melatonin tablet 5 mg  5 mg Oral At Bedtime PRN Jonny Berman MD   5 mg at 04/07/25 0305    menthol-zinc oxide (CALMOSEPTINE) 0.44-20.6 % ointment OINT   Topical 4x Daily PRN Jonny Berman MD        methocarbamol (ROBAXIN) tablet 1,000 mg  1,000 mg Oral TID Severson, Hayley, MD   1,000 mg at 04/07/25 1344    naloxone (NARCAN) injection 0.2 mg  0.2 mg Intravenous Q2 Min PRN Farzad Moralesn, RPH        Or    naloxone (NARCAN) injection 0.4 mg  0.4 mg Intravenous Q2 Min PRN Waibel Micheline, RPH        Or    naloxone (NARCAN) injection 0.2 mg  0.2 mg Intramuscular Q2 Min PRN Farzad Moralesn, RPH        Or    naloxone (NARCAN) injection 0.4 mg  0.4 mg Intramuscular Q2 Min PRN Farzad Moralesn, RPH        nicotine (NICODERM CQ) 14 MG/24HR 24 hr patch 1 patch  1 patch Transdermal Daily Margarita Muro MD   1 patch at 04/07/25 1016    OLANZapine (zyPREXA) tablet 2.5 mg  2.5 mg Oral Daily PRN Jonny Berman MD        OLANZapine (zyPREXA) tablet 5-10 mg  5-10 mg Oral At Bedtime PRN Jonny Berman MD        ondansetron (ZOFRAN ODT) ODT tab 4 mg  4 mg Oral Q6H PRN Jonny Berman MD   4 mg at 04/07/25 1420    Or    ondansetron (ZOFRAN) injection 4 mg  4 mg Intravenous Q6H PRN Jonny Berman MD        oxyCODONE (ROXICODONE) tablet 5 mg  5 mg Oral Q3H PRN Anca Wallace MD        oxyCODONE IR (ROXICODONE) tablet 10 mg  10 mg Oral 4x Daily Anca Wallace MD   10 mg at 04/07/25 1601    pantoprazole (PROTONIX) EC tablet 40 mg  40 mg Oral BID AC Jonny Berman MD   40 mg at 04/07/25 2840    Patient is already receiving anticoagulation with heparin, enoxaparin (LOVENOX), warfarin (COUMADIN)  or other anticoagulant medication   Does not apply Continuous PRN Jonny Berman MD        polyethylene glycol (MIRALAX) Packet 17 g  17 g Oral BID PRN Jonny Berman MD        prochlorperazine (COMPAZINE) injection 10 mg  10 mg Intravenous Q6H PRN Antonella,  MD Jonny        Or    prochlorperazine (COMPAZINE) tablet 10 mg  10 mg Oral Q6H PRN Jonny Berman MD   10 mg at 04/07/25 1504    QUEtiapine (SEROquel) tablet 25-50 mg  25-50 mg Oral BID PRN Jonny Berman MD        QUEtiapine (SEROquel) tablet 500 mg  500 mg Oral At Bedtime Severson, Hayley, MD        rivaroxaban ANTICOAGULANT (XARELTO) tablet 20 mg  20 mg Oral Daily with supper Jonny Berman MD   20 mg at 04/07/25 1601    senna-docusate (SENOKOT-S/PERICOLACE) 8.6-50 MG per tablet 1 tablet  1 tablet Oral BID PRN Jonny Berman MD        Or    senna-docusate (SENOKOT-S/PERICOLACE) 8.6-50 MG per tablet 2 tablet  2 tablet Oral BID PRN Jonny Berman MD        senna-docusate (SENOKOT-S/PERICOLACE) 8.6-50 MG per tablet 1 tablet  1 tablet Oral BID Jonny Berman MD        Or    senna-docusate (SENOKOT-S/PERICOLACE) 8.6-50 MG per tablet 2 tablet  2 tablet Oral BID Jonny Berman MD   2 tablet at 04/07/25 0850    sertraline (ZOLOFT) tablet 50 mg  50 mg Oral Daily Jonny Berman MD   50 mg at 04/07/25 0849    sodium chloride (PF) 0.9% PF flush 3 mL  3 mL Intracatheter Q8H MAICO Jonny Berman MD   3 mL at 04/07/25 1505    sodium chloride (PF) 0.9% PF flush 3 mL  3 mL Intracatheter q1 min prn Jonny Berman MD   3 mL at 04/07/25 0537    Vitamin D3 (CHOLECALCIFEROL) tablet 25 mcg  25 mcg Oral Daily Jonny Berman MD   25 mcg at 04/07/25 0849     Physical Exam:  General:  Well appearing adult female in NAD.  Alert and oriented x 3.  HEENT:  Normocephalic.  Sclera anicteric.  MMM.    Chest:  Respirating and conversing comfortably on room air.  Abd:  Soft/NT/ND.    Ext:  No pitting edema of the bilateral lower extremities.   Neuro:  Cranial nerves grossly intact.  Psych:  Mood and affect appear normal.           Data:   I personally reviewed and interpreted the below laboratories and images:     Latest Reference Range & Units 04/07/25 04:52   Sodium 135 - 145 mmol/L 139   Potassium 3.4 - 5.3 mmol/L 3.8   Chloride 98  - 107 mmol/L 104   Carbon Dioxide (CO2) 22 - 29 mmol/L 23   Urea Nitrogen 6.0 - 20.0 mg/dL 15.5   Creatinine 0.51 - 0.95 mg/dL 1.17 (H)   GFR Estimate >60 mL/min/1.73m2 57 (L)   Calcium 8.8 - 10.4 mg/dL 9.5   Anion Gap 7 - 15 mmol/L 12   Albumin 3.5 - 5.2 g/dL 3.8   Protein Total 6.4 - 8.3 g/dL 7.3   Alkaline Phosphatase 40 - 150 U/L 86   ALT 0 - 50 U/L 9   AST 0 - 45 U/L 16   Bilirubin Total <=1.2 mg/dL 0.2   (H): Data is abnormally high  (L): Data is abnormally low     Latest Reference Range & Units 04/07/25 04:52   WBC 4.0 - 11.0 10e3/uL 3.7 (L)   Hemoglobin 11.7 - 15.7 g/dL 10.2 (L)   Hematocrit 35.0 - 47.0 % 31.5 (L)   Platelet Count 150 - 450 10e3/uL 195   (L): Data is abnormally low     Latest Reference Range & Units 04/07/25 04:52   Absolute Eosinophils 0.0 - 0.7 10e3/uL 0.2   Absolute Immature Granulocytes <=0.4 10e3/uL 0.0   Absolute Lymphocytes 0.8 - 5.3 10e3/uL 1.4   Absolute Monocytes 0.0 - 1.3 10e3/uL 0.2   Absolute Neutrophils 1.6 - 8.3 10e3/uL 1.8   Absolute NRBCs 10e3/uL 0.0   NRBCs per 100 WBC <1 /100 0     4/6/2025 CT pelvis:  FINDINGS:     PELVIS ORGANS: There are multiple calcifications within the visualized inferior vena cava, including a large calcification in the right side of the IVC which fills greater than 50% of the IVC luminal diameter. No pelvic lymphadenopathy.     Patchy areas of fat stranding in the subcutaneous fat at the lower lateral flanks bilaterally, likely body wall edema/anasarca. These could represent soft tissue contusions if there has been recent trauma.     MUSCULOSKELETAL: Multifocal sclerotic osseous metastatic disease. Largest lesion at the posterior left ilium measuring approximately 4.7 x 1.9 cm, with a thin biopsy tract running through it. Additional large lesion involving the left anterior   acetabulum, which extends into the superior pubic ramus. There are several additional smaller metastases in the bony pelvis, including the right posterior acetabulum, and the  left ischial tuberosity. Small lesion in the anterior right femoral head. No   evidence of pathologic fracture.     Please see separate lumbar spine CT for discussion of the lumbar spine.                                                                      IMPRESSION:  1.  Multifocal sclerotic osseous metastatic disease in the bony pelvis. Largest lesions are at the posterior left ilium, and the left acetabulum. The left posterior ilium lesion has been previously biopsied. Additional small lesion in the anterior right   femoral head.   2.  No evidence of pathologic fracture.  3.  Please see separate lumbar spine CT for discussion of the lumbar spine.  4.  Patchy areas of fat stranding in the subcutaneous fat at the lower lateral flanks bilaterally, likely body wall edema/anasarca. These could represent soft tissue contusions if there has been recent trauma.  5.  Incidental finding of calcifications within the visualized inferior vena cava, including a large calcification in the right side of the IVC which fills greater than 50% of the IVC luminal diameter.     4/6/2025 CT Lumbar spine:  FINDINGS:  VERTEBRA: Normal vertebral body heights and alignment. Mildly heterogeneous increase in sclerosis affecting the L4 vertebral body, consistent with metastatic involvement. Erosive changes in inferior and to a lesser degree superior aspect of L4 without   definite overall significant height loss. These changes are similar to prior MRI, allowing for differences in technique. Interval changes of kyphoplasty with small amount of cement in prevertebral soft tissues. Bilateral fracture of pedicle of L4,   nondisplaced; age indeterminate, not definitely appreciated on the prior exam. Increased sclerosis in superior aspect of L5 on the left with erosive changes, similar to prior; likely also due to underlying metastases however degenerative changes could   have similar appearance. This is similar to prior, allowing for differences  in technique. Subtle sclerotic lesions involve T11, T12, L1, L2, L3, and S1; most consistent with metastases. Lesions at T11, T12, and anteriorly at L2 appears new since prior.   Tiny sclerotic lesion left anterior aspect of L3 is likely also new. Sclerosis is posterior aspect of S1 is likely degenerative.      CANAL/FORAMINA: Mild degenerative changes with mild to moderate canal narrowing at L4-5. Moderate to marked left and moderate right foraminal narrowing L4-5, mild right at L5-S1.     PARASPINAL: 3 cm hypodensity lateral aspect of right kidney, incompletely visualized; indeterminate. It was present previously as well, appearance suggests a cyst.                                                                      IMPRESSION:  1.  Mildly heterogeneous increase in sclerosis affecting the L4 vertebral body, consistent with metastatic involvement.   2.  Erosive changes in inferior and to a lesser degree superior aspect of L4 without definite overall significant height loss.   3.  These changes are similar to prior MRI, allowing for differences in technique.   4.  Interval changes of kyphoplasty at L4 with small amount of cement in prevertebral soft tissues.   5.  Bilateral fracture of pedicle of L4, nondisplaced; age indeterminate, not definitely appreciated on the prior exam.   6.  Increased sclerosis in superior aspect of L5 on the left with erosive changes, similar to prior; likely also due to underlying metastases however degenerative changes could have similar appearance. This is similar to prior, allowing for differences   in technique.   7.  Subtle sclerotic lesions involve T11, T12, L1, L2, L3, and mid S1; most consistent with metastases.   8.  Lesions at T11, T12, and anteriorly at L2 appear new since prior.   9.  Tiny sclerotic lesion left anterior aspect of L3 is likely also new.   10.  Sclerosis is posterior aspect of S1 is likely degenerative.    I spent 65 minutes on the date of the encounter doing  chart review, review of test results, interpretation of tests, patient visit, documentation, discussion with other provider(s), and discussion with family       Jahaira Plunkett MD

## 2025-04-07 NOTE — PROGRESS NOTES
Patient admitted to: 5420  Admitted from: ed  Arrived by: [facility]  Reason for admission: back pain, history of breast CA  Patient accompanied by:ED staff nurse  Belongings: cellphone, , earpods, clothes  Teaching: unit SOP and protocols  Skin double check completed by: Inocencio Arizmendi RN and Oriana Moss RN

## 2025-04-07 NOTE — PLAN OF CARE
Goal Outcome Evaluation:    Patient rates her lower back pain a 10 or higher, tried multiple treatments from lidocaine patches, voltaren gel, oxycodone, tylenol, and IV dilaudid. Patient felt the best relief came from the IV dilaudid. Palliative by to see patient and will make some changes in her pain medications to see if we can get it under better control.

## 2025-04-07 NOTE — CONSULTS
Palliative Care Consultation Note  [facility]      Patient: Teresa Sanderson  Date of Admission:  4/6/2025    Requesting Clinician / Team: Primary medicine team  Reason for consult: Pain management  Symptom management  Patient and family support     Recommendations & Counseling     GOALS OF CARE:   Not discussed during visit today due to severe pain.    ADVANCE CARE PLANNING:  No health care directive on file. Per system policy, Surrogate Decision-makers for Patients With Diminished Decision-making Capacity offers guidance on possible decision-makers.  Daughter at the bedside and lives with patient but I did not discuss with patient if she would be her surrogate decision-maker has been identified as a surrogate decision maker.   There is no POLST form on file,  recommend continued medical treatment and FULL CODE   Code status: Full Code    MEDICAL MANAGEMENT: all medication changes today ordered for you     Uncontrolled pain and anxiety and racing thoughts.  Patient states that her pain was under okay control until about 4 or 5 days prior to admission.  Pain then significantly increased and patient does not know why.  She stated she lifted a microwave from the floor but did not feel any pain at that time.  Patient states that she wonders if her severe anxiety is worsening the pain.  March 25 was the anniversary of her son's death and this has been a very difficult time for her.  She is also very worried that if she is going to die in the hospital or this year and that thoughts scares her very much.  Pain is in her lower back, without radiation, and is so severe that she is not able to sleep or walk or get comfortable.  Change Oxycodone 10mg QID scheduled  Oxycodone 5mg q4hrs PRN   Hydromorphone 0.5mg IV q2hrs PRN only use if oral opioids not controlling pain  Change gabapentin to 300mg TID - pt was not taking gabapentin at home  Change tylenol 975mg TID scheduled and  "325mg TID PRN  Continue Robaxin 1000mg TID scheduled   Continue lidocaine patch  Pt on anticoagulation so will not add NSAIDs at this time.   Agree with MRI of spine when able to rule out fracture    Anxiety/schizoaffective disorder, bipolar type on antipsychotic medications  Continue all prior to admission medications and await psychiatry consult    Palliative care  to support and may also involve palliative care .       PSYCHOSOCIAL/SPIRITUAL:  Family daughter at the bedside, son coming later.  Patient lives with her daughter and her grandson and this provides her significant comfort  Friends patient was talking to a friend on the phone when I entered the room and she states that talking to her friends provides her with significant support.  Would appreciate Spiritual Health Services, Consult has been placed for support   Patient states she is very Yarsani and prayer gives her support.    Our team: will continue to follow.. Thank you for the consult and allowing us to aid in the care of Teresa Sanderson.    These recommendations have been discussed with primary medicine team.    Anca Wallace MD  Securely message with [employer] (more info)  Text page via [employer]     During regular M-F work hours -- please contact team via Securely message with the Vocera Web Console (learn more here)  After regular work hours and on weekends/holidays, you can call our answering service at [phone]. Also, who's on call for us is available in [employer].   If you are not sure who specifically to contact -- please text the \"Palliative Care [facility]\" voice group in [employer].       Palliative Summary / HPI     Teresa Sanderson is a 49 year old female with a past medical history of metastatic breast cancer dx 12/2020; widespread bone mets. S/p RT to lumbar spine and RFA/kyphoplasty L4 5/2021. She has been on several different lines of therapy (with some treatment interruptions).  -PET/CT in " 04/2023 showed PD in two small bone metastasis in L pelvis, S/p 5 fractions RT to these lesions.   -Some lapses/breaks in treatment due to complicated social situation.  -Evidence of PD on 12/2023 PET scan.  Changed to Verzenio 01/2024 and Fulvestrant. Again s/p RT to low back and L hip 07/2024. She presented to the ED on 4/6 with 4-5 days of increasing low back pain, unable to control pain with pulm medications.      Today, the patient was seen for:  Progressive, metastatic breast cancer with known bony mets  Low back pain  Cancer related pain  Anxiety  Insomnia  Support  Palliative care encounter    History of Present Illness:  History gathered today from: patient, family/loved ones, medical chart, medical team members, unit team members  I introduced our role as an extra layer of support and how we help patients and families dealing with serious, potentially life-limiting illnesses. I explained the composition of the palliative care team.  Palliative care helps patients and families navigate their care while focusing on the whole person; providing emotional, social and spiritual support  Palliative care often assists with symptom management, information sharing about what to expect from the illness, available treatment options and what effect those options may have on the disease course, and provide effective communication and caring support.    Patient is familiar with palliative care services as she follows in our outpatient clinic.  She is very appreciative of the support of palliative care.      Prognosis, Goals, & Planning:    Functional Status just prior to this current hospitalization:  Outpatient Palliative Performance Score (PPS) 70%  Significant evidence of disease.  Full/normal self-care & LOC; normal or reduced intake, reduced ambulation and activity/work.      Prognosis, Goals, and/or Advance Care Planning:  Not addressed today        Patient has decision-making capacity today for complex  decisions:Intact            Coping, Meaning, & Spirituality:   Mood, coping, and/or meaning in the context of serious illness were addressed today: Yes    Social:   Living situation: Lives with her daughter and states her daughter takes very good care of her.  Also lives with a grandson  Anniversary of son's death (march 25th).   One son in shelter      ROS:  Comprehensive ROS is reviewed and is negative except as here & per HPI:     Medications:  I have reviewed this patient's medication profile and medications from this hospitalization.   Noted meds are:  Tylenol 975mg q6hrs scheduled  Gabapentin 900mg TID  Lidocaine patch   Robaxin 1000mg TID   Oxycodone 5mg q4hrs scheduled  No oxycodone PRN  Hydromorphone 0.5mg IV q2hrs PRN     Senna 2 tabs BID     Seroquel 500mg at bedtime  Zoloft 50mg every day    Last gabapentin refill 6/2024  Oxycodone 5mg tabs, #200 - 17 day supply; refilled 3/10 and 2/17  [place] Board of Pharmacy Data Base Reviewed: Yes:   reviewed - controlled substances reflected in medication list..    Physical Exam:  Vital Signs: Temp: 98.5  F (36.9  C) Temp src: Oral BP: 119/86 Pulse: 67   Resp: 18 SpO2: 100 % O2 Device: None (Room air)    Weight: 238 lbs 1.6 oz  Gen: Sitting upright in bed, talking on the phone, alert, appears stated age, no increased work of breathing, in no acute distress, speech pressured, sensorium intact  Back: Mild pain to palpation in the lower right spinal area, no swelling or erythema    Data reviewed:  Reviewed recent labs and pertinent imaging  ROUTINE ICU LABS (Last four results)  CMP  Recent Labs   Lab 04/07/25  0452 04/06/25  2333    139   POTASSIUM 3.8 4.1   CHLORIDE 104 102   CO2 23 25   ANIONGAP 12 12   * 118*   BUN 15.5 15.9   CR 1.17* 1.30*   GFRESTIMATED 57* 50*   NANDO 9.5 10.1   PROTTOTAL 7.3 7.6   ALBUMIN 3.8 3.9   BILITOTAL 0.2 0.2   ALKPHOS 86 93   AST 16 18   ALT 9 10     CBC  Recent Labs   Lab 04/07/25  0452 04/06/25  2333   WBC 3.7* 3.8*    RBC 3.08* 3.23*   HGB 10.2* 10.9*   HCT 31.5* 33.7*   * 104*   MCH 33.1* 33.7*   MCHC 32.4 32.3   RDW 13.0 12.9    200     INRNo lab results found in last 7 days.  Arterial Blood GasNo lab results found in last 7 days.    EKG 4/6: QTc 430  Troponin <6    CT pelvis 4/6:   IMPRESSION:  1.  Multifocal sclerotic osseous metastatic disease in the bony pelvis. Largest lesions are at the posterior left ilium, and the left acetabulum. The left posterior ilium lesion has been previously biopsied. Additional small lesion in the anterior right femoral head.   2.  No evidence of pathologic fracture.  3.  Please see separate lumbar spine CT for discussion of the lumbar spine.  4.  Patchy areas of fat stranding in the subcutaneous fat at the lower lateral flanks bilaterally, likely body wall edema/anasarca. These could represent soft tissue contusions if there has been recent trauma.  5.  Incidental finding of calcifications within the visualized inferior vena cava, including a large calcification in the right side of the IVC which fills greater than 50% of the IVC luminal diameter.     CT lumbar spine 4/6:  IMPRESSION:  1.  Mildly heterogeneous increase in sclerosis affecting the L4 vertebral body, consistent with metastatic involvement.   2.  Erosive changes in inferior and to a lesser degree superior aspect of L4 without definite overall significant height loss.   3.  These changes are similar to prior MRI, allowing for differences in technique.   4.  Interval changes of kyphoplasty at L4 with small amount of cement in prevertebral soft tissues.   5.  Bilateral fracture of pedicle of L4, nondisplaced; age indeterminate, not definitely appreciated on the prior exam.   6.  Increased sclerosis in superior aspect of L5 on the left with erosive changes, similar to prior; likely also due to underlying metastases however degenerative changes could have similar appearance. This is similar to prior, allowing for  differences in technique.   7.  Subtle sclerotic lesions involve T11, T12, L1, L2, L3, and mid S1; most consistent with metastases.   8.  Lesions at T11, T12, and anteriorly at L2 appear new since prior.   9.  Tiny sclerotic lesion left anterior aspect of L3 is likely also new.   10.  Sclerosis is posterior aspect of S1 is likely degenerative.         Medical Decision Making       MANAGEMENT DISCUSSED with the following over the past 24 hours: Primary medicine team   NOTE(S)/MEDICAL RECORDS REVIEWED over the past 24 hours: Medicine, nursing, oncology,  Tests personally interpreted in the past 24 hours:  - CT lumbar spine showing new lesions at T11, T12, and anteriorly at L2  Tests REVIEWED in the past 24 hours:  - CMP  - CBC  - Troponin  - EKG  SUPPLEMENTAL HISTORY, in addition to the patient's history, over the past 24 hours obtained from:   - Daughter         Chart documentation was completed using Dragon voice-recognition software. Even though reviewed, some grammatical, spelling, and word errors may remain.

## 2025-04-07 NOTE — PLAN OF CARE
"Goal Outcome Evaluation:    BP (!) 126/95   Pulse 82   Temp 97.8  F (36.6  C) (Oral)   Resp 20   Ht 1.829 m (6')   Wt 108.9 kg (240 lb)   SpO2 100%   BMI 32.55 kg/m      Received from ED, history of breast Cancer with metastasis to the back. Admitted for uncontrolled back pain. Patient stated 10/10 back pain. VSS. Afebrile. On room air. Alert and oriented 4x. On standby assist for safety. Calls appropriately. Make her needs know.   Skin is intact. Heat applied to the lower back. PRN oxycodone x1 given, PRN roboxin x1 given, Prn Melatonin given as requested. Patient stated pain decreases at rest. Continue POC    Problem: Adult Inpatient Plan of Care  Goal: Plan of Care Review  Description: The Plan of Care Review/Shift note should be completed every shift.  The Outcome Evaluation is a brief statement about your assessment that the patient is improving, declining, or no change.  This information will be displayed automatically on your shiftnote.  Outcome: Progressing  Goal: Patient-Specific Goal (Individualized)  Description: You can add care plan individualizations to a care plan. Examples of Individualization might be:  \"Parent requests to be called daily at 9am for status\", \"I have a hard time hearing out of my right ear\", or \"Do not touch me to wake me up as it startlesme\".  Outcome: Progressing  Goal: Absence of Hospital-Acquired Illness or Injury  Outcome: Progressing  Intervention: Prevent Skin Injury  Recent Flowsheet Documentation  Taken 4/7/2025 0200 by Inocencio Arizmendi RN  Body Position: position changed independently  Goal: Optimal Comfort and Wellbeing  Outcome: Progressing  Intervention: Monitor Pain and Promote Comfort  Recent Flowsheet Documentation  Taken 4/7/2025 0200 by Inocencio Arizmendi RN  Pain Management Interventions:   repositioned   rest   heat applied  Goal: Readiness for Transition of Care  Outcome: Progressing                           "

## 2025-04-07 NOTE — PROGRESS NOTES
[facility]: Cancer Care                                                                                          Got a message from Dr Plunkett to follow-up with [employer] re:  Test results that we have been waiting for since 3/5.Per [employer] the pt never signed the ABN and they attempted to have her sign it several times. After a month of not being able to get a signature they are required to cancel the test. Test was cancelled on 4/5 and Notice was going to come out today.   Will update MD to see how to proceed.     Signature:  Myriam Ramirez RNCC
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
## 2025-04-08 PROBLEM — N17.9 AKI (ACUTE KIDNEY INJURY): Status: ACTIVE | Noted: 2025-04-08

## 2025-04-08 LAB
ALBUMIN UR-MCNC: 20 MG/DL
ANION GAP SERPL CALCULATED.3IONS-SCNC: 13 MMOL/L (ref 7–15)
APPEARANCE UR: ABNORMAL
BILIRUB UR QL STRIP: NEGATIVE
BUN SERPL-MCNC: 17.4 MG/DL (ref 6–20)
CALCIUM SERPL-MCNC: 10.2 MG/DL (ref 8.8–10.4)
CHLORIDE SERPL-SCNC: 102 MMOL/L (ref 98–107)
COLOR UR AUTO: YELLOW
CREAT SERPL-MCNC: 1.78 MG/DL (ref 0.51–0.95)
EGFRCR SERPLBLD CKD-EPI 2021: 34 ML/MIN/1.73M2
GLUCOSE SERPL-MCNC: 126 MG/DL (ref 70–99)
GLUCOSE UR STRIP-MCNC: NEGATIVE MG/DL
HCO3 SERPL-SCNC: 25 MMOL/L (ref 22–29)
HGB UR QL STRIP: NEGATIVE
HYALINE CASTS: 11 /LPF
KETONES UR STRIP-MCNC: NEGATIVE MG/DL
LEUKOCYTE ESTERASE UR QL STRIP: ABNORMAL
MUCOUS THREADS #/AREA URNS LPF: PRESENT /LPF
NITRATE UR QL: NEGATIVE
PH UR STRIP: 5.5 [PH] (ref 5–7)
POTASSIUM SERPL-SCNC: 4 MMOL/L (ref 3.4–5.3)
RBC URINE: 2 /HPF
SODIUM SERPL-SCNC: 140 MMOL/L (ref 135–145)
SODIUM UR-SCNC: 42 MMOL/L
SP GR UR STRIP: 1.02 (ref 1–1.03)
SQUAMOUS EPITHELIAL: 3 /HPF
TRANSITIONAL EPI: <1 /HPF
UROBILINOGEN UR STRIP-MCNC: NORMAL MG/DL
WBC URINE: 22 /HPF

## 2025-04-08 PROCEDURE — 82374 ASSAY BLOOD CARBON DIOXIDE: CPT

## 2025-04-08 PROCEDURE — 84300 ASSAY OF URINE SODIUM: CPT

## 2025-04-08 PROCEDURE — 81001 URINALYSIS AUTO W/SCOPE: CPT

## 2025-04-08 PROCEDURE — 250N000013 HC RX MED GY IP 250 OP 250 PS 637: Performed by: INTERNAL MEDICINE

## 2025-04-08 PROCEDURE — 36415 COLL VENOUS BLD VENIPUNCTURE: CPT

## 2025-04-08 PROCEDURE — 87086 URINE CULTURE/COLONY COUNT: CPT

## 2025-04-08 PROCEDURE — 250N000013 HC RX MED GY IP 250 OP 250 PS 637: Performed by: STUDENT IN AN ORGANIZED HEALTH CARE EDUCATION/TRAINING PROGRAM

## 2025-04-08 PROCEDURE — 99222 1ST HOSP IP/OBS MODERATE 55: CPT | Performed by: PSYCHIATRY & NEUROLOGY

## 2025-04-08 PROCEDURE — 250N000013 HC RX MED GY IP 250 OP 250 PS 637

## 2025-04-08 PROCEDURE — 99222 1ST HOSP IP/OBS MODERATE 55: CPT | Mod: GC | Performed by: RADIOLOGY

## 2025-04-08 PROCEDURE — L0464 TLSO 4MOD SACRO-SCAP PRE: HCPCS

## 2025-04-08 PROCEDURE — 120N000005 HC R&B MS OVERFLOW UMMC

## 2025-04-08 PROCEDURE — 80048 BASIC METABOLIC PNL TOTAL CA: CPT

## 2025-04-08 PROCEDURE — 250N000011 HC RX IP 250 OP 636: Mod: JZ | Performed by: STUDENT IN AN ORGANIZED HEALTH CARE EDUCATION/TRAINING PROGRAM

## 2025-04-08 PROCEDURE — 99233 SBSQ HOSP IP/OBS HIGH 50: CPT | Mod: GC

## 2025-04-08 RX ORDER — SERTRALINE HYDROCHLORIDE 25 MG/1
100 TABLET, FILM COATED ORAL DAILY
Status: DISCONTINUED | OUTPATIENT
Start: 2025-04-09 | End: 2025-04-14 | Stop reason: HOSPADM

## 2025-04-08 RX ORDER — LAMOTRIGINE 25 MG/1
500 TABLET ORAL
COMMUNITY

## 2025-04-08 RX ADMIN — OXYCODONE HYDROCHLORIDE 5 MG: 5 TABLET ORAL at 15:42

## 2025-04-08 RX ADMIN — HYDROMORPHONE HYDROCHLORIDE 0.5 MG: 1 INJECTION, SOLUTION INTRAMUSCULAR; INTRAVENOUS; SUBCUTANEOUS at 23:18

## 2025-04-08 RX ADMIN — DICLOFENAC SODIUM 2 G: 10 GEL TOPICAL at 09:14

## 2025-04-08 RX ADMIN — HYDROMORPHONE HYDROCHLORIDE 0.5 MG: 1 INJECTION, SOLUTION INTRAMUSCULAR; INTRAVENOUS; SUBCUTANEOUS at 13:58

## 2025-04-08 RX ADMIN — HYDROMORPHONE HYDROCHLORIDE 0.5 MG: 1 INJECTION, SOLUTION INTRAMUSCULAR; INTRAVENOUS; SUBCUTANEOUS at 09:26

## 2025-04-08 RX ADMIN — PANTOPRAZOLE SODIUM 40 MG: 40 TABLET, DELAYED RELEASE ORAL at 15:40

## 2025-04-08 RX ADMIN — ACETAMINOPHEN 975 MG: 325 TABLET, FILM COATED ORAL at 13:57

## 2025-04-08 RX ADMIN — METHOCARBAMOL 1000 MG: 500 TABLET ORAL at 09:12

## 2025-04-08 RX ADMIN — Medication 25 MCG: at 09:14

## 2025-04-08 RX ADMIN — DICLOFENAC SODIUM 2 G: 10 GEL TOPICAL at 13:58

## 2025-04-08 RX ADMIN — OXYCODONE HYDROCHLORIDE 10 MG: 10 TABLET ORAL at 06:24

## 2025-04-08 RX ADMIN — DICLOFENAC SODIUM 2 G: 10 GEL TOPICAL at 19:48

## 2025-04-08 RX ADMIN — OXYCODONE HYDROCHLORIDE 10 MG: 10 TABLET ORAL at 21:33

## 2025-04-08 RX ADMIN — HYDROMORPHONE HYDROCHLORIDE 0.5 MG: 1 INJECTION, SOLUTION INTRAMUSCULAR; INTRAVENOUS; SUBCUTANEOUS at 17:59

## 2025-04-08 RX ADMIN — METHOCARBAMOL 1000 MG: 500 TABLET ORAL at 19:47

## 2025-04-08 RX ADMIN — SENNOSIDES AND DOCUSATE SODIUM 2 TABLET: 50; 8.6 TABLET ORAL at 19:47

## 2025-04-08 RX ADMIN — PANTOPRAZOLE SODIUM 40 MG: 40 TABLET, DELAYED RELEASE ORAL at 09:13

## 2025-04-08 RX ADMIN — POLYETHYLENE GLYCOL 3350 17 G: 17 POWDER, FOR SOLUTION ORAL at 15:47

## 2025-04-08 RX ADMIN — HYDROMORPHONE HYDROCHLORIDE 0.5 MG: 1 INJECTION, SOLUTION INTRAMUSCULAR; INTRAVENOUS; SUBCUTANEOUS at 15:47

## 2025-04-08 RX ADMIN — OXYCODONE HYDROCHLORIDE 5 MG: 5 TABLET ORAL at 19:07

## 2025-04-08 RX ADMIN — NICOTINE 1 PATCH: 14 PATCH, EXTENDED RELEASE TRANSDERMAL at 23:22

## 2025-04-08 RX ADMIN — PROCHLORPERAZINE MALEATE 10 MG: 5 TABLET ORAL at 11:31

## 2025-04-08 RX ADMIN — QUETIAPINE FUMARATE 500 MG: 400 TABLET ORAL at 21:33

## 2025-04-08 RX ADMIN — GABAPENTIN 300 MG: 300 CAPSULE ORAL at 06:24

## 2025-04-08 RX ADMIN — GABAPENTIN 300 MG: 300 CAPSULE ORAL at 13:57

## 2025-04-08 RX ADMIN — SERTRALINE HYDROCHLORIDE 50 MG: 25 TABLET ORAL at 09:13

## 2025-04-08 RX ADMIN — GABAPENTIN 300 MG: 300 CAPSULE ORAL at 21:33

## 2025-04-08 RX ADMIN — OXYCODONE HYDROCHLORIDE 10 MG: 10 TABLET ORAL at 11:33

## 2025-04-08 RX ADMIN — ACETAMINOPHEN 975 MG: 325 TABLET, FILM COATED ORAL at 21:33

## 2025-04-08 RX ADMIN — NICOTINE 1 PATCH: 14 PATCH, EXTENDED RELEASE TRANSDERMAL at 09:12

## 2025-04-08 RX ADMIN — RIVAROXABAN 20 MG: 20 TABLET, FILM COATED ORAL at 17:38

## 2025-04-08 RX ADMIN — OXYCODONE HYDROCHLORIDE 10 MG: 10 TABLET ORAL at 15:41

## 2025-04-08 RX ADMIN — ACETAMINOPHEN 975 MG: 325 TABLET, FILM COATED ORAL at 06:24

## 2025-04-08 RX ADMIN — OXYCODONE HYDROCHLORIDE 5 MG: 5 TABLET ORAL at 01:02

## 2025-04-08 RX ADMIN — SENNOSIDES AND DOCUSATE SODIUM 2 TABLET: 50; 8.6 TABLET ORAL at 09:13

## 2025-04-08 RX ADMIN — METHOCARBAMOL 1000 MG: 500 TABLET ORAL at 13:57

## 2025-04-08 ASSESSMENT — ACTIVITIES OF DAILY LIVING (ADL)
ADLS_ACUITY_SCORE: 46
ADLS_ACUITY_SCORE: 49
ADLS_ACUITY_SCORE: 46
ADLS_ACUITY_SCORE: 49
ADLS_ACUITY_SCORE: 46
ADLS_ACUITY_SCORE: 46
ADLS_ACUITY_SCORE: 49
ADLS_ACUITY_SCORE: 46
ADLS_ACUITY_SCORE: 46
ADLS_ACUITY_SCORE: 47
ADLS_ACUITY_SCORE: 49
ADLS_ACUITY_SCORE: 46
ADLS_ACUITY_SCORE: 47
ADLS_ACUITY_SCORE: 46
ADLS_ACUITY_SCORE: 49
ADLS_ACUITY_SCORE: 46
ADLS_ACUITY_SCORE: 46

## 2025-04-08 NOTE — PHARMACY-ADMISSION MEDICATION HISTORY
Pharmacist Admission Medication History    Admission medication history is complete. The information provided in this note is only as accurate as the sources available at the time of the update.    Information Source(s): Patient and CareEverywhere/SureScripts records via in person    Pertinent Information: Last administration times per RN assessment. Of note, most recently on Fulvestrant/abemaciclib for ER positive, HER2 negative breast cancer. Last dose of fluvestrant was on 3/27/25, not due for next dose until 4/24/25.    Changes made to PTA medication list:  Added: Fulvestrant  Deleted: Gabapentin, Xarelto starter pack  Changed: None    Allergies reviewed with patient and updates made in EHR: no    Medication History Completed By: Anna Ly [facility] 4/8/2025 1:17 PM    PTA Med List   Medication Sig Note Last Dose/Taking    abemaciclib (VERZENIO) 100 MG tablet Take 1 tablet (100 mg) by mouth 2 times daily for 28 days  3/27/2025    fulvestrant (FASLODEX) 250 MG/5ML injection Inject 500 mg into the muscle every 28 days.  3/27/2025    hydrOXYzine HCl (ATARAX) 25 MG tablet Take 1 tablet (25 mg) by mouth 4 times daily as needed for anxiety or other (panic).  4/6/2025    methocarbamol (ROBAXIN) 500 MG tablet Take 2-3 tablets (1,000-1,500 mg) by mouth 3 times daily as needed for muscle spasms. 4/6/2025: Needs to  from pharmacy Unknown    OLANZapine (ZYPREXA) 5 MG tablet Take 1-2 tablets (5-10 mg) by mouth nightly as needed for sleep. Take 1/2 tablet (2.5mg) daily as needed for hypomania/ tom. (Patient taking differently: at bedtime. Take 1-2 tablets (5-10 mg) by mouth nightly as needed for sleep. Take 1/2 tablet (2.5mg) daily as needed for hypomania/ tom.)  4/5/2025 Bedtime    omeprazole (PRILOSEC) 40 MG DR capsule Take 1 capsule (40 mg) by mouth 2 times daily.  4/6/2025    QUEtiapine (SEROQUEL) 100 MG tablet Tale 1 tablet (100mg) with 1 tablet (400mg) for total of 500mg by mouth at bedtime  4/5/2025     QUEtiapine (SEROQUEL) 400 MG tablet Take 1 tablet (400mg) with 1 tablet (100mg) for total of 500mg by mouth at bedtime  4/5/2025    QUEtiapine (SEROQUEL) 50 MG tablet Take 0.5-1 tablets (25-50 mg) by mouth 2 times daily as needed (for sleep, mood and anxiety).  4/5/2025    rivaroxaban ANTICOAGULANT (XARELTO) 20 MG TABS tablet Take 1 tablet (20 mg) by mouth daily (with dinner).  4/5/2025 Bedtime    sertraline (ZOLOFT) 100 MG tablet Take 0.5 tablets (50 mg) by mouth daily.  4/5/2025    Vitamin D3 (CHOLECALCIFEROL) 25 mcg (1000 units) tablet Take 1 tablet (25 mcg) by mouth daily.  Unknown

## 2025-04-08 NOTE — PLAN OF CARE
A&O. VSS on ra. SBA with walker. Pain improved with schedule pain medications and PRN oxy and dilaudid. No BM. Will continue on POC.   Problem: Adult Inpatient Plan of Care  Goal: Plan of Care Review  Description: The Plan of Care Review/Shift note should be completed every shift.  The Outcome Evaluation is a brief statement about your assessment that the patient is improving, declining, or no change.  This information will be displayed automatically on your shiftnote.  Outcome: Progressing   Goal Outcome Evaluation:

## 2025-04-08 NOTE — CONSULTS
"          Psychiatry Consultation; Follow up              Reason for Consult, requesting source:    Anxiety and racing thoughts. She receives psychiatric care by Odette Peralta MD at [facility] psych clinic, with last visit 2/10/25. 69 psychiatric encounters in [facility].   Requesting source: Jonny Cotamona    Labs and imaging reviewed,     Total time spent in chart review, patient interview and coordination of care; 70 min            Interim history:    Per medicine progress note 4/7:   \"Teresa Sanderson is a 49 year old female admitted on 4/6/2025. She has h/o metastatic breast cancer diagnosed in 2020 s/p radiation and chemotherapy. Currently on chemotherapy. Presents with increased low back pain.\"    This woman with a past history of schizoaffective disorder, bipolar type had been referred to [facility] psychiatric clinic in 2020 after her diagnosis of metastatic breast cancer.   Per 2/10/25 psych clinic visit:   \"-Start hydroxyzine 25mg QID PRN   -Continue Olanzapine 5-10mg at bedtime for sleep and mood stabilization    -Continue Quetiapine to 500mg at bedtime (patient takes 400-500mg at bedtime and 100mg once she wakes up early morning)   -Continue Sertraline 50mg daily\"    \"I get all worked up when stressful things happen, I thought for awhile that I wasn't going to get out of here\". However, she had now calmed down. She knows that she has metastatic cancer and is of course upset about this.   At baseline she has voices and paranoia, and these are stable, but anxiety is increased.   We discussed going up to 100 mg to address the anxiety and she agreed to try that.           Current Medications:     Current Facility-Administered Medications   Medication Dose Route Frequency Provider Last Rate Last Admin    acetaminophen (TYLENOL) tablet 975 mg  975 mg Oral TID Anca Wallace MD   975 mg at 04/08/25 0624    diclofenac (VOLTAREN) 1 % topical gel 2 g  2 g Topical 4x Daily Severson, Hayley, MD   2 g at 04/08/25 " "0914    gabapentin (NEURONTIN) capsule 300 mg  300 mg Oral TID Anca Wallace MD   300 mg at 04/08/25 0624    Lidocaine (LIDOCARE) 4 % Patch 3 patch  3 patch Transdermal Q24H Severson, Hayley, MD   3 patch at 04/07/25 0900    methocarbamol (ROBAXIN) tablet 1,000 mg  1,000 mg Oral TID Severson, Hayley, MD   1,000 mg at 04/08/25 0912    nicotine (NICODERM CQ) 14 MG/24HR 24 hr patch 1 patch  1 patch Transdermal Daily Margarita Muro MD   1 patch at 04/08/25 0912    oxyCODONE IR (ROXICODONE) tablet 10 mg  10 mg Oral 4x Daily Anca Wallace MD   10 mg at 04/08/25 1133    pantoprazole (PROTONIX) EC tablet 40 mg  40 mg Oral BID AC Jonny Berman MD   40 mg at 04/08/25 0913    QUEtiapine (SEROquel) tablet 500 mg  500 mg Oral At Bedtime Severson, Hayley, MD   500 mg at 04/07/25 2150    rivaroxaban ANTICOAGULANT (XARELTO) tablet 20 mg  20 mg Oral Daily with supper Jonny Berman MD   20 mg at 04/07/25 1601    senna-docusate (SENOKOT-S/PERICOLACE) 8.6-50 MG per tablet 1 tablet  1 tablet Oral BID Jonny Berman MD   1 tablet at 04/07/25 2151    Or    senna-docusate (SENOKOT-S/PERICOLACE) 8.6-50 MG per tablet 2 tablet  2 tablet Oral BID Jonny Berman MD   2 tablet at 04/08/25 0913    sertraline (ZOLOFT) tablet 50 mg  50 mg Oral Daily Jonny Berman MD   50 mg at 04/08/25 0913    sodium chloride (PF) 0.9% PF flush 3 mL  3 mL Intracatheter Q8H MAICO Jonny Berman MD   3 mL at 04/08/25 0631    Vitamin D3 (CHOLECALCIFEROL) tablet 25 mcg  25 mcg Oral Daily Jonny Berman MD   25 mcg at 04/08/25 0914              MSE:   Appearance: awake, alert, adequately groomed, and moderately obese  Attitude:  cooperative  Eye Contact:  good  Mood:  \"fair\"  Affect:  intensity is dramatic  Speech:  pressured speech  Psychomotor Behavior:  no evidence of tardive dyskinesia, dystonia, or tics  Muscle strength and tone: intact   Thought Process:  circumstantial  Associations:  no loose associations  Thought Content:  auditory " "hallucinations present  Insight:  fair  Judgement:  fair  Oriented to:  time, person, and place  Attention Span and Concentration:  fair  Recent and Remote Memory:  fair    Vital signs:  Temp: 97.8  F (36.6  C) Temp src: Axillary BP: 95/43 Pulse: 70   Resp: 16 SpO2: 98 % O2 Device: None (Room air)   Height: 182.9 cm (6') Weight:  (Patient was too tired to get up for wt will check later)  Estimated body mass index is 32.29 kg/m  as calculated from the following:    Height as of this encounter: 1.829 m (6').    Weight as of this encounter: 108 kg (238 lb 1.6 oz).    Qtc: 430 ms 4/6         DSM-5 Diagnosis:   295.70  (F25) Schizoaffective Disorder Bipolar Type  JENNIFFER           Assessment:   When I came to see her she was finishing up with the jess, and she really appreciated this. I see that a psychology consult has been placed; she should benefit from this.   A higher dose of Zoloft may help with her anxiety.             Summary of Recommendations:   Consider increasing Zoloft to 100 mg per day   Would keep the Seroquel at 500 mg HS, and continue PRN hydroxyzine        Contact me as needed.  Zack Connell M.D.   Consult Liaison Psychiatry   [facility]   Contact on Vocera  If I am not available, then [facility] line ([phone]) should know who   Is covering our consult service.       \"Much or all of the text in this note was generated through the use of Dragon Dictate voice to text software. Errors in spelling or words which appear to be out of contact are unintentional, may be present due having escaped editing\"           "

## 2025-04-08 NOTE — PLAN OF CARE
Goal Outcome Evaluation:    Patient states she has pain in her lower back, given multiple doses of different pain medications. Patient appears in better spirits, could use an increase in her activity to see if her back pain would improve.

## 2025-04-08 NOTE — PLAN OF CARE
Care assumed of this patient from 15:00 to 19:30. Patient medicated as ordered for pain,  she's rating pain high on the number scale, patient VSS patient calm and cooperative. Brace in place. Patient up in the recliner, she has a healthy appetite. Continue to monitor.  Problem: Adult Inpatient Plan of Care  Goal: Plan of Care Review  Description: The Plan of Care Review/Shift note should be completed every shift.  The Outcome Evaluation is a brief statement about your assessment that the patient is improving, declining, or no change.  This information will be displayed automatically on your shiftnote.  Outcome: Not Progressing   Goal Outcome Evaluation:

## 2025-04-08 NOTE — CONSULTS
[facility]       NEUROSURGERY CONSULTATION  NOTE    This consultation was requested by the Medicine service.        Reason for Consultation  Low back pain  with evidence of bilateral L4 pedicle fractures on CT lumbar spine     HPI:  49 year old female with history of DVT/PE (on Xarelto), metastatic breast CA diagnosed in 2020, and Schizoaffective disorder who presented to the ED on 4/6/2025 with 5 days of acute low back pain.  The patient was initially diagnosed with Breast CA in 2020 and at that time presented with low back pain.  Imaging was obtained at that time which revealed a left breast mass and lytic lesions of T7, L4, and the pelvis.  Patient has since undergone multiple treatments of radiation and chemotherapy.  Most recent surveillance PET/CT on 2/3/25 showed progression of bony mets, this was in the context of pt not taking her chemotherapy medications. States that since the PET scan she has been taking her chemotherapy as prescribed.    The patient states her current symptoms began one week ago after she lifted a small microwave off the floor and placed it on the counter.  She states the pain in the back is centered in the low back near her beltline.  She states she does have a history of chronic pain in this area, however this pain is unremitting.   Denies radiation of pain into the lower extremities, denies changes in bowel or bladder function, has not noted weakness in the lower extremities.  Has been taking oxycodone, tylenol, and ibuprofen with little relief.   Transitioning from sitting to standing and activity exacerbate pain the most.   In the past she has undergone radiation to her lower back (treatment from L3-5) and did undergo kyphoplasty procedure at L4.  She states she feels the radiation provided relief, but does not feel the kyphoplasty provided much improvement.  Denies additional trauma or injury.   States prior to lifting the microwave  she has tolerating activity and able to walk, her currently pain is making activity difficult.   Give the recent findings, Neurosurgery was consulted for recommendations.       Metastatic Breast Cancer Treatment:  12/23/2020 - 1/7/2021  Radiation (3000 cGy) to the lumbar spine from L3-L5.  1/29/2021 - 04/2023  Ibrance, zoladex, and anastrozole.  5/17/2021 radiofrequency ablation, kyphoplasty to L4  8/20/2021  Bilateral salpingo-oophorectomies, Ibrance and anastrozole.  She was off anastrozole 12/2021 - 2/2022 and off Ibrance 01/2022 - 02/2022 due to stress and impending homelessness. Off both again 03/2022-04/2022 due to death of her son but restarted in 05/2022.  04/2023  PET/CT with progression in 2 small metastases in the left pelvis.  Palbociclib continued.  Anastrozole changed to exemestane  5/5/2023  Completed radiation, 2000 cGy in 5 fractions, to the left ilium.  12/11/2023 PET/CT with new left breast lesion, new T7 vertebral metastasis and progression of hypermetabolic foci in the bony pelvis c/w disease progression.  Ibrance and exemestane discontinued.  12/19/2023 Left breast biopsy  [facility] NGS:              ER positive, 3+  100%              WA positive, 1+, 5%              HER2 negative.              AR positive, 1+, 35%              MMR proficient              PD-L1 negative, CPS 0              PTEN positive, 1+ 100%  *Of note, all of the above was IHC, DNA quantity not sufficient for NGS  1/18/24 - present  Fulvestrant + abemaciclib.  Abemaciclib dose reduced to 100 mg PO BID due to hand foot syndrome.  7/18/2024  completed radiation to the left acetabulum and the SI       PAST MEDICAL HISTORY:   Past Medical History:   Diagnosis Date    Anxiety     Breast CA (H) 12/2020    Depression     DVT (deep venous thrombosis) (H) 2014    Left breast mass     x approximately 4-5 months    Pulmonary emboli (H)     Pyelonephritis     Schizoaffective disorder (H)     Tobacco use        PAST SURGICAL HISTORY:   Past  Surgical History:   Procedure Laterality Date    COLONOSCOPY N/A 7/8/2022    Procedure: COLONOSCOPY, WITH POLYPECTOMY;  Surgeon: Ham Cano MD;  Location: [facility] OR    ESOPHAGOSCOPY, GASTROSCOPY, DUODENOSCOPY (EGD), COMBINED N/A 3/7/2025    Procedure: ESOPHAGOGASTRODUODENOSCOPY, WITH BIOPSY;  Surgeon: Nguyen Holliday MD;  Location: [facility] OR    IR LUMBAR KYPHOPLASTY VERTEBRAE  5/17/2021    LAPAROSCOPIC SALPINGO-OOPHORECTOMY Bilateral 8/20/2021    Procedure: BILATERAL SALPINGO-OOPHORECTOMY, LAPAROSCOPIC;  Surgeon: Rory Lopez MD;  Location: [facility] OR    TUBAL LIGATION  1998       FAMILY HISTORY:   Family History   Problem Relation Age of Onset    Lung Cancer Mother     Lung Cancer Father     Lupus Brother     Kidney Disease Brother     Diabetes Daughter     Asthma Son     Cardiovascular Maternal Aunt     Hypertension Other     Diabetes Other     Deep Vein Thrombosis (DVT) No family hx of        SOCIAL HISTORY:   Social History     Tobacco Use    Smoking status: Some Days     Current packs/day: 0.25     Average packs/day: 0.3 packs/day for 13.9 years (3.5 ttl pk-yrs)     Types: Cigarettes     Start date: 5/13/2011     Passive exposure: Current    Smokeless tobacco: Never    Tobacco comments:     4-5 per day   Substance Use Topics    Alcohol use: Not Currently     Comment: occ       MEDICATIONS:  No current outpatient medications on file.       Allergies:  Allergies   Allergen Reactions    Contrast Dye      Pt developed nausea after isovue 370 injection on 6/9/21        ROS: 10 point ROS of systems including Constitutional, Eyes, Respiratory, Cardiovascular, Gastroenterology, Genitourinary, Integumentary, Muscularskeletal, Psychiatric were all negative except for pertinent positives noted in my HPI.    Physical exam:   Blood pressure 95/43, pulse 70, temperature 97.8  F (36.6  C), temperature source Axillary, resp. rate 16, height 1.829 m (6'), weight 108 kg (238 lb 1.6 oz), SpO2 98%, not currently  breastfeeding.  General: awake and alert  HEENT: normocephalic   PULM: breathing comfortably on room air  MSK: normal bulk and tone   NEUROLOGIC:  -- Awake; Alert; oriented x 3  -- Follows commands briskly  -- +repetition, calculation, and naming  -- Speech fluent, spontaneous. No aphasia or dysarthria.  -- no gaze preference. No apparent hemineglect.  Cranial Nerves:  -- visual fields full to confrontation, PERRL 3-2mm bilat and brisk, extraocular movements intact  -- face symmetrical, tongue midline  -- sensory V1-V3 intact bilaterally  -- palate elevates symmetrically, uvula midline  -- hearing grossly intact bilat  -- Trapezii 5/5 strength bilat symmetric  -- Cerebellar: Finger nose finger without dysmetria, intact rapid alternating motions bilaterally    Motor:  Normal bulk / tone; no tremor, rigidity, or bradykinesia.  No muscle wasting or fasciculations  No Pronator Drift     Delt Bi Tri Hand Flexion/  Extension Iliopsoas Quadriceps Hamstrings Tibialis Anterior Gastroc    C5 C6 C7 C8/T1 L2 L3 L4-S1 L4 S1   R 5 5 5 5 5 5 5 5 5   L 5 5 5 5 5 5 5 5 5     Sensory:  intact to LT x 4 extremities       Reflexes: no clonus       Bi Tri BR Anya Pat Ach Bab     C5-6 C7-8 C6 UMN L2-4 S1 UMN   R 2+ 2+ 2+ Norm 2+ 2+ Norm   L 2+ 2+ 2+ Norm 2+ 2+ Norm            IMAGING:  Lumbar MRI:   Impression:   1. Redemonstration of metastatic lesion of T7 without evidence of  acute fracture. Multiple additional subtle thoracic vertebral  metastases. No high-grade thoracic spinal canal or neural foraminal  stenosis.  2. No myelopathic cord signal.  3. Metastatic lesion of L4 with changes of vertebroplasty. No  vertebral body height loss. Bilateral L4 pedicle fractures better  demonstrated on CT.  4. Additional sclerotic lumbar vertebral metastases better  demonstrated on CT. Persistent metastatic lesions in the iliac bones,  greater on the left.   5. Multilevel lumbar spondylosis greatest at L4-5 with moderate  bilateral neural  foraminal stenosis.    Lumbar CT:   IMPRESSION:  1.  Mildly heterogeneous increase in sclerosis affecting the L4 vertebral body, consistent with metastatic involvement.   2.  Erosive changes in inferior and to a lesser degree superior aspect of L4 without definite overall significant height loss.   3.  These changes are similar to prior MRI, allowing for differences in technique.   4.  Interval changes of kyphoplasty at L4 with small amount of cement in prevertebral soft tissues.   5.  Bilateral fracture of pedicle of L4, nondisplaced; age indeterminate, not definitely appreciated on the prior exam.   6.  Increased sclerosis in superior aspect of L5 on the left with erosive changes, similar to prior; likely also due to underlying metastases however degenerative changes could have similar appearance. This is similar to prior, allowing for differences   in technique.   7.  Subtle sclerotic lesions involve T11, T12, L1, L2, L3, and mid S1; most consistent with metastases.   8.  Lesions at T11, T12, and anteriorly at L2 appear new since prior.   9.  Tiny sclerotic lesion left anterior aspect of L3 is likely also new.   10.  Sclerosis is posterior aspect of S1 is likely degenerative.       LABS:   Last Comprehensive Metabolic Panel:  Lab Results   Component Value Date     04/07/2025    POTASSIUM 3.8 04/07/2025    CHLORIDE 104 04/07/2025    CO2 23 04/07/2025    ANIONGAP 12 04/07/2025     (H) 04/07/2025    BUN 15.5 04/07/2025    CR 1.17 (H) 04/07/2025    GFRESTIMATED 57 (L) 04/07/2025    NANDO 9.5 04/07/2025         Lab Results   Component Value Date    WBC 3.7 04/07/2025    WBC 5.4 06/23/2021     Lab Results   Component Value Date    RBC 3.08 04/07/2025    RBC 3.30 06/23/2021     Lab Results   Component Value Date    HGB 10.2 04/07/2025    HGB 10.3 06/23/2021     Lab Results   Component Value Date    HCT 31.5 04/07/2025    HCT 31.6 06/23/2021     Lab Results   Component Value Date     04/07/2025    MCV  96 06/23/2021     Lab Results   Component Value Date    MCH 33.1 04/07/2025    MCH 31.2 06/23/2021     Lab Results   Component Value Date    MCHC 32.4 04/07/2025    MCHC 32.6 06/23/2021     Lab Results   Component Value Date    RDW 13.0 04/07/2025    RDW 14.2 06/23/2021     Lab Results   Component Value Date     04/07/2025     06/23/2021     INR   Date Value Ref Range Status   02/18/2025 0.96 0.85 - 1.15 Final   05/17/2021 1.03 0.86 - 1.14 Final      PTT   Date Value Ref Range Status   12/06/2020 29 22 - 37 sec Final     aPTT   Date Value Ref Range Status   07/01/2024 25 22 - 38 Seconds Final           ASSESSMENT:  49 year old with history of metastatic breast cancer with 7 day history of acute low back pain and evidence of bilateral L4 pedicle fractures         RECOMMENDATIONS   -No acute indication for surgery at this time  -Do no recommend radiation therapy to given the pedicle fractures may potentially heal with time  -Recommend TLSO bracing when out of bed  -Please upright AP/Lateral x-rays in brace to assess alignment  -Activity restrictions:  No bending or twisting, no lifting over 10 pounds.  Ambulate as tolerated.    -Repeat lumbar x-rays in 6 weeks and lumbar CT in 3 months  -Depending on prognosis and patient's goals of care, if she begins to slip at L4 we would consider placing pedicle screws for stabilization.  Will follow patient in clinic as outpatient to assess this.    -Remainder of cares per primary team             RENO Lua, CNP  Department of Neurosurgery  Pager: [phone]      The patient was discussed with Dr. Shadi Leger, neurosurgery chief resident, and Dr. Modesta Goodrich, neurosurgery staff.   I have discussed this patient with the resident and formulated a plan and agree with this note.  AMP

## 2025-04-08 NOTE — DISCHARGE SUMMARY
[facility]  Discharge Summary - Medicine & Pediatrics    Date of Admission:  4/6/2025  Date of Discharge:  4/14/2025  Discharging Provider: Dr. Jonny Livingston  Discharge Service: Medicine Service, GAIL TEAM 1    Discharge Diagnoses   - Metastatic Breast Cancer  - Uncontrolled Pain  - Bilateral L4 pedicle fractures. Interval 7 mm anterolisthesis of L4 on L5 concerning for instability   - Anxiety  - Schizoaffective disorder with bipolar features  - Acute kidney injury, resolved    Clinically Significant Risk Factors     # Obesity: Estimated body mass index is 34.3 kg/m  as calculated from the following:    Height as of this encounter: 1.829 m (6').    Weight as of this encounter: 114.7 kg (252 lb 14.4 oz).       Follow-ups Needed After Discharge   Follow-up Appointments       ADULT [facility] Specialty Follow-up and recommended labs and tests      Follow up: Follow up with Oncology within a month to continue chemotherapy and start the new therapy plan.     Follow up with Neurosurgery to repeat Xrays and discuss spinal surgery within 2 weeks.     Follow up with Palliative care within 2 weeks to continue your pain management plan.     Appointments on [place] and/or [facility] (with [facility] or [facility] provider or service). Call [phone] if you haven't heard regarding these appointments within 7 days of discharge.        Follow Up ([facility])      Follow-up with Renetta Miranda PA-C in Neurosurgery clinic next week with x-rays prior to visit     Appointments on [place] and/or [facility] (with [facility] or [facility] provider or service). Call [phone] if you haven't heard regarding these appointments within 7 days of discharge.            Please see Oncology soon to start new chemo.     Please see pain/Palliative care within 2 weeks to continue pain management and get more medications.     Please see Neurosurgery to discuss the need for surgery on your lower back. Continue  wearing your back brace until you see them.     Unresulted Labs Ordered in the Past 30 Days of this Admission       No orders found from 3/7/2025 to 4/7/2025.            Discharge Disposition   Discharged to home  Condition at discharge: Stable    Hospital Course   Teresa Sanderson is a 49 year old female admitted on 4/6/2025. She has metastatic breast cancer (diagnosed 202) status post radiation and chemotherapy, history of DVTs and PE currently on Xarelto, who was admitted for worsening pain and found to have progression bone metastases with bilateral pedicle fracture at L4.  She was discharged with improving pain control with Belbuca vs. oxycodone     # Metastatic breast cancer to bone  # Cancer-associated pain  # Bilateral L4 pedicle fractures. Interval 7 mm anterolisthesis of L4 on L5 concerning for instability.  PTA was on treatment with abemaciclib (stopped due to FELTON) and fulvestrant admitted with intractable low back and right hip pain. Imaging consistent with progressive bone metastases including bilateral pedicle fracture at L4.  - Oncology consulted - will switch chemo on discharge to everolimus and they will coordinate  - Palliative consulted for assistance with pain management   - Neurosurgery consulted for pedicle fracture and consideration of brace, activity restrictions   - Recommend TLSO bracing when out of bed  - Activity restrictions:  No bending or twisting, no lifting over 10 pounds.  Ambulate as tolerated.    - Outpatient follow up in 1 week with x-rays   - Radiation oncology consulted for possible role of palliative radiation - will wait to see what her outpatient PET shows  - Pain regimen as follows (appreciate palliative's assistance):   - Scheduled Belbuca (increased on day of discharge) with PRN oxycodone to get to her palliative care appointment in 2 weeks   - Gabapentin 300 mg TID  - Diclofenac 1% topical   - Lidocaine patch  - Methocarbamol 1g TID     #Limited  mobility  #Deconditioning  - Physical therapy was consulted on 04/11/25 with recommendation of TCU vs. Home with 24/hr assist. Teresa would prefer going home and her daughters and son will be able to provide 24/7 assistance.      # H/o DVT and PE  # SOB, resolved  Initially presented with dyspnea but no hypoxia, normal BNP, troponin, WBC, and CXR on admission without tachycardia or chest pain reassuring against PE. No CT PE performed, has contrast allergy. Reported not missing any doses of Xarelto. High risk for clots given malignancy and US shows persistent non-occlusive thrombus at left popliteal vein.   - Continue PTA Xarelto     # GERD  # LA Grade A esophagitis  # Non-bleeding gastric ulcers   Last EGD on 3/7/25 given abnormal PET scan of GI tract.   - PTA Protonix 40mg BID. This should continue for 8 weeks from EGD      # FELTON, resolved   Baseline Cr of 1.0, increased to 1.78, felt to be in setting of Abemaciclib. Creatinine improved with discontinuation.      # Schizoaffective disorder with Bipolar features  # Anxiety/Depression  The patient related that 1 son is incarcerated which has been giving her a great deal of stress.  Furthermore, her other son recently had a anniversary of death last month. These added stressors have increased her racing thoughts, and health anxiety recently with the progression of her pain.Psychiatry was consulted on 04/08 with the following recommendation:  - Increase Zoloft to 100mg daily   - Continue PTA Seroquel at 500mg at bedtime + PRN hydroxyzine  - Additional 25-50mg Seroquel BID PRN for anxiety  - No concerns on day of discharge       Consultations This Hospital Stay   PALLIATIVE CARE ADULT IP CONSULT  PSYCHOLOGY ADULT IP CONSULT  ONCOLOGY ADULT IP CONSULT  CARE MANAGEMENT / SOCIAL WORK IP CONSULT  SPIRITUAL HEALTH SERVICES IP CONSULT  PSYCHIATRY IP CONSULT  ORTHOPAEDIC SURGERY ADULT/PEDS IP CONSULT  NEUROSURGERY ADULT IP CONSULT  RADIATION ONCOLOGY IP CONSULT  SPIRITUAL  HEALTH SERVICES IP CONSULT  PHARMACY LIAISON FOR MEDICATION COVERAGE CONSULT  PHYSICAL THERAPY ADULT IP CONSULT  OCCUPATIONAL THERAPY ADULT IP CONSULT  IP PALLIATIVE CARE SOCIAL WORK ADULT CONSULT  PHARMACY LIAISON FOR MEDICATION COVERAGE CONSULT    Code Status   Full Code       The patient was discussed with MD [facility] [address]  [address] [address]  Phone: [phone]  Fax: [phone]  ______________________________________________________________________    Physical Exam   Vital Signs: Temp: 97.7  F (36.5  C) Temp src: Oral BP: 122/87 Pulse: 62   Resp: 18 SpO2: 96 % O2 Device: None (Room air)    Weight: 252 lbs 14.4 oz  GEN: Comfortable appearing, sitting up in bed, conversant  HEENT: Face symmetric. Patches of discoloration on tongue, non-tender to palpation   CV: Normal rate, regular rhythm  Pulm: No increased work of breathing  Neuro: Alert and interactive, thoughts were very organized, tracking conversation  Psych: Alert, appropriately interactive, seemingly well-oriented         Primary Care Physician   Lavonne Frazier    Discharge Orders      X-ray lumbar spine 2-3 views*     Primary Care - Care Coordination Referral      Primary Care - Care Coordination Referral      Home Care Referral      Primary Care - Care Coordination Referral      Follow Up ([facility])    Follow-up with Renetta Miranda PA-C in Neurosurgery clinic next week with x-rays prior to visit     Appointments on [place] and/or [facility] (with [facility] or [facility] provider or service). Call [phone] if you haven't heard regarding these appointments within 7 days of discharge.     Activity    Your activity upon discharge:   Do not do any bending, twisting, strenuous exercise, or heavy lifting (greater than 10 pounds) for 4-6 weeks. Be careful and ask for assistance when walking or going up and down stairs. Avoid any activities that could result  in trauma to the surgical wound. Do not drive within 3 months of having your last seizure or while using narcotics or other sedating medications, such as sleep aids, muscle relaxants, etc.     Reason for your hospital stay    You were admitted for increased acute pain in the setting of chronic pain from metastatic breast cancer. You were found to have a new pedicle fracture in your lumbar spine and need to wear the new back brace any time you are out of bed and follow up with the Neurosurgery team after you leave the hospital. If you notice numbness or tingling in your legs, please call them right away. You will also follow up with the Palliative care team to continue to manage pain, including with your new medication Buprinorphine, and the Oncology team to start new chemotherapy.     ADULT [facility] Specialty Follow-up and recommended labs and tests    Follow up: Follow up with Oncology within a month to continue chemotherapy and start the new therapy plan.     Follow up with Neurosurgery to repeat Xrays and discuss spinal surgery within 2 weeks.     Follow up with Palliative care within 2 weeks to continue your pain management plan.     Appointments on [place] and/or [facility] (with [facility] or [facility] provider or service). Call [phone] if you haven't heard regarding these appointments within 7 days of discharge.     When to contact your care team    [facility] cancer clinic triage line at [phone] for temp > or = 100.4, uncontrolled nausea/vomiting/diarrhea/constipation, unrelieved pain, bleeding not relieved with pressure, dizziness, chest pain, shortness of breath, loss of consciousness, and any new or concerning symptoms.     Discharge Instructions    We have let Dr. Plunkett know that you are leaving the hospital, and she will work on setting up your next cancer treatment. She is planning on starting you on a pill called everolimus.     Reacher Order for DME - ONLY FOR DME    Access Ramp  Documentation:   Describe the reason for need: Precautions against bending, mobility difficulties in deep squats.     I, the undersigned, certify that the above prescribed supplies are medically necessary for this patient and is both reasonable and necessary in reference to accepted standards of medical and necessary in reference to accepted standards of medical practice in the treatment of this patient's condition and is not prescribed as a convenience.     Bath Bench with Back     Walker Order for DME - ONLY FOR DME    Pt requires four wheel walker with seat due to spinal precautions and impaired activity tolerance.     Diet    Follow this diet upon discharge: Regular diet, eat as able       Significant Results and Procedures   Results for orders placed or performed during the hospital encounter of 04/06/25   CT Lumbar Spine w/o Contrast    Narrative    EXAM: CT LUMBAR SPINE W/O CONTRAST  LOCATION: [facility]  DATE: 4/6/2025    INDICATION: Back pain, history of metastatic cancer  COMPARISON: MRI lumbar spine 3/11/2021  TECHNIQUE: Routine CT Lumbar Spine without IV contrast. Multiplanar reformats. Dose reduction techniques were used.     FINDINGS:  VERTEBRA: Normal vertebral body heights and alignment. Mildly heterogeneous increase in sclerosis affecting the L4 vertebral body, consistent with metastatic involvement. Erosive changes in inferior and to a lesser degree superior aspect of L4 without   definite overall significant height loss. These changes are similar to prior MRI, allowing for differences in technique. Interval changes of kyphoplasty with small amount of cement in prevertebral soft tissues. Bilateral fracture of pedicle of L4,   nondisplaced; age indeterminate, not definitely appreciated on the prior exam. Increased sclerosis in superior aspect of L5 on the left with erosive changes, similar to prior; likely also due to underlying metastases however  degenerative changes could   have similar appearance. This is similar to prior, allowing for differences in technique. Subtle sclerotic lesions involve T11, T12, L1, L2, L3, and S1; most consistent with metastases. Lesions at T11, T12, and anteriorly at L2 appears new since prior.   Tiny sclerotic lesion left anterior aspect of L3 is likely also new. Sclerosis is posterior aspect of S1 is likely degenerative.     CANAL/FORAMINA: Mild degenerative changes with mild to moderate canal narrowing at L4-5. Moderate to marked left and moderate right foraminal narrowing L4-5, mild right at L5-S1.    PARASPINAL: 3 cm hypodensity lateral aspect of right kidney, incompletely visualized; indeterminate. It was present previously as well, appearance suggests a cyst.      Impression    IMPRESSION:  1.  Mildly heterogeneous increase in sclerosis affecting the L4 vertebral body, consistent with metastatic involvement.   2.  Erosive changes in inferior and to a lesser degree superior aspect of L4 without definite overall significant height loss.   3.  These changes are similar to prior MRI, allowing for differences in technique.   4.  Interval changes of kyphoplasty at L4 with small amount of cement in prevertebral soft tissues.   5.  Bilateral fracture of pedicle of L4, nondisplaced; age indeterminate, not definitely appreciated on the prior exam.   6.  Increased sclerosis in superior aspect of L5 on the left with erosive changes, similar to prior; likely also due to underlying metastases however degenerative changes could have similar appearance. This is similar to prior, allowing for differences   in technique.   7.  Subtle sclerotic lesions involve T11, T12, L1, L2, L3, and mid S1; most consistent with metastases.   8.  Lesions at T11, T12, and anteriorly at L2 appear new since prior.   9.  Tiny sclerotic lesion left anterior aspect of L3 is likely also new.   10.  Sclerosis is posterior aspect of S1 is likely degenerative.    CT  Pelvis Bone wo Contrast    Narrative    EXAM: CT PELVIS BONE WO CONTRAST  LOCATION: [facility]  DATE: 4/6/2025    INDICATION: back pain SI pain, hx of metastatic cancer, eval for bony abnormalities mets interval change  COMPARISON: CT chest abdomen pelvis 10/05/2020. No recent comparison CTs available.  TECHNIQUE: CT scan of the pelvis was performed without IV contrast. Multiplanar reformats were obtained. Dose reduction techniques were used.  CONTRAST: None.    FINDINGS:    PELVIS ORGANS: There are multiple calcifications within the visualized inferior vena cava, including a large calcification in the right side of the IVC which fills greater than 50% of the IVC luminal diameter. No pelvic lymphadenopathy.    Patchy areas of fat stranding in the subcutaneous fat at the lower lateral flanks bilaterally, likely body wall edema/anasarca. These could represent soft tissue contusions if there has been recent trauma.    MUSCULOSKELETAL: Multifocal sclerotic osseous metastatic disease. Largest lesion at the posterior left ilium measuring approximately 4.7 x 1.9 cm, with a thin biopsy tract running through it. Additional large lesion involving the left anterior   acetabulum, which extends into the superior pubic ramus. There are several additional smaller metastases in the bony pelvis, including the right posterior acetabulum, and the left ischial tuberosity. Small lesion in the anterior right femoral head. No   evidence of pathologic fracture.    Please see separate lumbar spine CT for discussion of the lumbar spine.      Impression    IMPRESSION:  1.  Multifocal sclerotic osseous metastatic disease in the bony pelvis. Largest lesions are at the posterior left ilium, and the left acetabulum. The left posterior ilium lesion has been previously biopsied. Additional small lesion in the anterior right   femoral head.   2.  No evidence of pathologic fracture.  3.  Please see  separate lumbar spine CT for discussion of the lumbar spine.  4.  Patchy areas of fat stranding in the subcutaneous fat at the lower lateral flanks bilaterally, likely body wall edema/anasarca. These could represent soft tissue contusions if there has been recent trauma.  5.  Incidental finding of calcifications within the visualized inferior vena cava, including a large calcification in the right side of the IVC which fills greater than 50% of the IVC luminal diameter.    Chest XR,  PA & LAT    Narrative    EXAM: XR CHEST 2 VIEWS  LOCATION: [facility]  DATE: 4/6/2025    INDICATION: SOB  COMPARISON: 10/15/2022      Impression    IMPRESSION: Negative chest.   MR Lumbar Spine w/o Contrast    Narrative    Thoracic and Lumbar spine MRI without contrast 4/8/2025    History: bony mets, significant pain.    Comparison: CT lumbar spine 4/6/2025. PET/CT 2/3/2025.    Technique:  Axial T2-weighted, and sagittal T1, STIR, and T2-weighted  images of the lumbar spine without intravenous contrast. Sagittal T1,  STIR, and T2-weighted images of the thoracic spine without contrast  were obtained, with axial T2-weighted images through levels of  interest in the thoracic spine. Diffusion-weighted images of the  thoracolumbar spine were also acquired.    Findings:  Thoracic Spine:   Within the limits of respiratory motion, there is no definite abnormal  signal in the thoracic spinal cord. Alignment of the thoracic vertebra  appears within normal limits. Redemonstration of metastatic lesion in  the vertebral body of T7 with replacement of the normal bone marrow.  No evidence of pathologic fracture. Additional more subtle scattered  regions of thoracic vertebral body marrow T1 hypointensity with  corresponding T2 and STIR hyperintensity, most conspicuous T8.    Mild multilevel thoracic facet hypertrophy and minimal disc bulges. No  high-grade thoracic spinal canal or neural foraminal  stenosis.    Subtle signal abnormality in a posterior midthoracic left rib likely  corresponds to hypermetabolism on PET/CT (series 100, image 31).    Lumbar Spine:  There are 5 type lumbar vertebra, used for the purposes of this  dictation.  The tip of the conus is at L1. Grade 1 anterolisthesis of  L4-5.  Replacement of the normal fatty marrow of L4 and changes of  vertebroplasty. No significant vertebral body height loss. Mild edema  related to the bilateral L4 pedicle fractures better demonstrated on  the comparison CT examination. Similar but less pronounced marrow  signal changes in the left posterosuperior L5 vertebral body with  Schmorl's node deformity.  On a level by level basis, the findings are as follows:    L1-2: Left facet hypertrophy. No spinal canal or neural foraminal  stenosis.    L2-3:  No spinal canal or neural foraminal stenosis.    L3-4:  Mild disc bulge and bilateral facet hypertrophy. No spinal  canal stenosis. Mild bilateral neural foraminal narrowing.    L4-5:  Grade 1 anterolisthesis. Asymmetric left disc bulge with  moderate bilateral neural foraminal stenosis. Mild to moderate spinal  canal narrowing. Facet arthropathy.    L5-S1:  Minimal posterior disc bulge. Left greater than right facet  hypertrophy. Mild bilateral neural foraminal narrowing. No spinal  canal stenosis.    Metastatic lesions in the iliac bones, greater on the left.    The visualized paraspinous tissues anteriorly are unremarkable.    Moderate lumbar subcutaneous edema.      Impression    Impression:   1. Redemonstration of metastatic lesion of T7 without evidence of  acute fracture. Multiple additional subtle thoracic vertebral  metastases. No high-grade thoracic spinal canal or neural foraminal  stenosis.  2. No myelopathic cord signal.  3. Metastatic lesion of L4 with changes of vertebroplasty. No  vertebral body height loss. Bilateral L4 pedicle fractures better  demonstrated on CT.  4. Additional sclerotic lumbar  vertebral metastases better  demonstrated on CT. Persistent metastatic lesions in the iliac bones,  greater on the left.   5. Multilevel lumbar spondylosis greatest at L4-5 with moderate  bilateral neural foraminal stenosis.    I have personally reviewed the examination and initial interpretation  and I agree with the findings.    NALLELY JOY MD         SYSTEM ID:  [id number]   US Lower Extremity Venous Duplex Bilateral    Narrative    EXAMINATION: DOPPLER VENOUS ULTRASOUND OF BILATERAL LOWER EXTREMITIES,  4/7/2025 11:22 AM     COMPARISON: Lower extremity venous ultrasound from 12/5/2024.    HISTORY: High risk for PE, swelling, dyspnea    TECHNIQUE:  Gray-scale evaluation with compression, spectral flow and  color Doppler assessment of the deep venous system of both legs from  groin to knee, and then at the ankles.    FINDINGS:  In the right lower extremity, the common femoral, femoral, popliteal ,  peroneal and posterior tibial veins demonstrate normal compressibility  and blood flow.    In the left lower extremity, the common femoral, femoral, peroneal and  posterior tibial veins demonstrate normal compressibility and blood  flow.  The left popliteal vein is incompletely compressible. Nonocclusive  thrombus was present at this level on exam from 12/5/2024.      Impression    IMPRESSION:      1. Nonocclusive thrombus at the left popliteal vein.  2. No deep vein thrombosis in the right lower extremity.    I have personally reviewed the examination and initial interpretation  and I agree with the findings.    HUSSEIN CAMEJO MD         SYSTEM ID:  [id number]   MR Thoracic Spine w/o Contrast    Narrative    Thoracic and Lumbar spine MRI without contrast 4/8/2025    History: bony mets, significant pain.    Comparison: CT lumbar spine 4/6/2025. PET/CT 2/3/2025.    Technique:  Axial T2-weighted, and sagittal T1, STIR, and T2-weighted  images of the lumbar spine without intravenous contrast. Sagittal T1,  STIR, and  T2-weighted images of the thoracic spine without contrast  were obtained, with axial T2-weighted images through levels of  interest in the thoracic spine. Diffusion-weighted images of the  thoracolumbar spine were also acquired.    Findings:  Thoracic Spine:   Within the limits of respiratory motion, there is no definite abnormal  signal in the thoracic spinal cord. Alignment of the thoracic vertebra  appears within normal limits. Redemonstration of metastatic lesion in  the vertebral body of T7 with replacement of the normal bone marrow.  No evidence of pathologic fracture. Additional more subtle scattered  regions of thoracic vertebral body marrow T1 hypointensity with  corresponding T2 and STIR hyperintensity, most conspicuous T8.    Mild multilevel thoracic facet hypertrophy and minimal disc bulges. No  high-grade thoracic spinal canal or neural foraminal stenosis.    Subtle signal abnormality in a posterior midthoracic left rib likely  corresponds to hypermetabolism on PET/CT (series 100, image 31).    Lumbar Spine:  There are 5 type lumbar vertebra, used for the purposes of this  dictation.  The tip of the conus is at L1. Grade 1 anterolisthesis of  L4-5.  Replacement of the normal fatty marrow of L4 and changes of  vertebroplasty. No significant vertebral body height loss. Mild edema  related to the bilateral L4 pedicle fractures better demonstrated on  the comparison CT examination. Similar but less pronounced marrow  signal changes in the left posterosuperior L5 vertebral body with  Schmorl's node deformity.  On a level by level basis, the findings are as follows:    L1-2: Left facet hypertrophy. No spinal canal or neural foraminal  stenosis.    L2-3:  No spinal canal or neural foraminal stenosis.    L3-4:  Mild disc bulge and bilateral facet hypertrophy. No spinal  canal stenosis. Mild bilateral neural foraminal narrowing.    L4-5:  Grade 1 anterolisthesis. Asymmetric left disc bulge with  moderate  bilateral neural foraminal stenosis. Mild to moderate spinal  canal narrowing. Facet arthropathy.    L5-S1:  Minimal posterior disc bulge. Left greater than right facet  hypertrophy. Mild bilateral neural foraminal narrowing. No spinal  canal stenosis.    Metastatic lesions in the iliac bones, greater on the left.    The visualized paraspinous tissues anteriorly are unremarkable.    Moderate lumbar subcutaneous edema.      Impression    Impression:   1. Redemonstration of metastatic lesion of T7 without evidence of  acute fracture. Multiple additional subtle thoracic vertebral  metastases. No high-grade thoracic spinal canal or neural foraminal  stenosis.  2. No myelopathic cord signal.  3. Metastatic lesion of L4 with changes of vertebroplasty. No  vertebral body height loss. Bilateral L4 pedicle fractures better  demonstrated on CT.  4. Additional sclerotic lumbar vertebral metastases better  demonstrated on CT. Persistent metastatic lesions in the iliac bones,  greater on the left.   5. Multilevel lumbar spondylosis greatest at L4-5 with moderate  bilateral neural foraminal stenosis.    I have personally reviewed the examination and initial interpretation  and I agree with the findings.    NALLELY JOY MD         SYSTEM ID:  [id number]   XR Thoracic Lumbar Standing 2 Views     Value    Radiologist flags New anterolisthesis of L4 on L5. (Urgent)    Narrative    2 views thoracolumbar spine radiographs 4/9/2025 1:56 PM    History: bilateral L4 pedicle fracture    Comparison: MRI thoracic and lumbar spine 4/7/2025    Findings:    Standing  AP and lateral  views of the thoracolumbar spine were  obtained.    12 rib bearing vertebral bodies and 5  lumbar type vertebral bodies  are identified.    Vertebral body sclerosis and vertebroplasty changes at L4. No acute  vertebral body height loss. Bilateral L4 pedicle fracture.    No substantial degenerative changes of the thoracic spine. There is  multilevel degenerative  changes of the lumbar spine, most pronounced  at L4-5 and L5-S1 with moderate to severe disc space loss. Otherwise  remaining disc spaces are relatively preserved. There is also lower  lumbar predominant facet arthropathy. Grade 1 anterolisthesis of L4 on  L5 measuring 7 mm.    The visualized lungs are clear. Cardiomediastinal silhouette is within  normal limits. Nonobstructive bowel gas pattern.      Impression    Impression:   1. Bilateral L4 pedicle fractures. Interval 7 mm anterolisthesis of L4  on L5 concerning for instability.  2. Multilevel degenerative changes of the lumbar spine, pronounced at  L4-5 and L5-S1.    [[facility]: New anterolisthesis of L4 on L5.]    This report will be copied to the [facility] to ensure a  provider acknowledges the finding. [facility] is available Monday  through Friday 8am-3:30 pm.     KIERA DUFF MD         SYSTEM ID:  [id number]     *Note: Due to a large number of results and/or encounters for the requested time period, some results have not been displayed. A complete set of results can be found in Results Review.       Discharge Medications   Discharge Medication List as of 4/14/2025  3:02 PM        START taking these medications    Details   Buprenorphine HCl (BELBUCA) 300 MCG FILM buccal film Place 1 Film (300 mcg) inside cheek every 12 hours for 14 days., Disp-28 Film, R-0, E-Prescribe           CONTINUE these medications which have CHANGED    Details   gabapentin (NEURONTIN) 300 MG capsule Take 1 capsule (300 mg) by mouth 3 times daily., Disp-90 capsule, R-0, E-Prescribe      naloxone (NARCAN) 4 MG/0.1ML nasal spray Spray 1 spray (4 mg) into one nostril alternating nostrils as needed for opioid reversal. every 2-3 minutes until assistance arrives, Disp-2 each, R-0, E-Prescribe      oxyCODONE IR (ROXICODONE) 10 MG tablet Take 1 tablet (10 mg) by mouth every 4 hours as needed for severe pain., Disp-84 tablet, R-0, E-Prescribe      sertraline (ZOLOFT)  100 MG tablet Take 1 tablet (100 mg) by mouth daily., Disp-30 tablet, R-0, E-Prescribe      methocarbamol (ROBAXIN) 500 MG tablet Take 2 tablets (1,000 mg) by mouth 3 times daily., Disp-180 tablet, R-0, E-Prescribe           CONTINUE these medications which have NOT CHANGED    Details   fulvestrant (FASLODEX) 250 MG/5ML injection Inject 500 mg into the muscle every 28 days., Historical      OLANZapine (ZYPREXA) 5 MG tablet Take 1-2 tablets (5-10 mg) by mouth nightly as needed for sleep. Take 1/2 tablet (2.5mg) daily as needed for hypomania/ tom., Disp-30 tablet, R-1, E-Prescribe      omeprazole (PRILOSEC) 40 MG DR capsule Take 1 capsule (40 mg) by mouth 2 times daily., Disp-112 capsule, R-0, E-Prescribe      !! QUEtiapine (SEROQUEL) 100 MG tablet Tale 1 tablet (100mg) with 1 tablet (400mg) for total of 500mg by mouth at bedtime, Disp-30 tablet, R-2, E-Prescribe      !! QUEtiapine (SEROQUEL) 400 MG tablet Take 1 tablet (400mg) with 1 tablet (100mg) for total of 500mg by mouth at bedtime, Disp-30 tablet, R-2, E-Prescribe      !! QUEtiapine (SEROQUEL) 50 MG tablet Take 0.5-1 tablets (25-50 mg) by mouth 2 times daily as needed (for sleep, mood and anxiety)., Disp-60 tablet, R-2, E-Prescribe      rivaroxaban ANTICOAGULANT (XARELTO) 20 MG TABS tablet Take 1 tablet (20 mg) by mouth daily (with dinner)., Disp-90 tablet, R-3, E-Prescribe      Vitamin D3 (CHOLECALCIFEROL) 25 mcg (1000 units) tablet Take 1 tablet (25 mcg) by mouth daily., Disp-90 tablet, R-3, E-Prescribe      hydrOXYzine HCl (ATARAX) 25 MG tablet Take 1 tablet (25 mg) by mouth 4 times daily as needed for anxiety or other (panic)., Disp-120 tablet, R-1, E-Prescribe       !! - Potential duplicate medications found. Please discuss with provider.        STOP taking these medications       abemaciclib (VERZENIO) 100 MG tablet Comments:   Reason for Stopping:             Allergies   Allergies   Allergen Reactions    Contrast Dye      Pt developed nausea after  isovue 370 injection on 6/9/21

## 2025-04-08 NOTE — CONSULTS
"SPIRITUAL HEALTH SERVICES - Consult Note  [facility] ([place]) 5C  Referral Source/Reason for Visit: Consult    Summary and Recommendations -  Palliative consult. Janet is Sabianism and has a strong belief in God. She says she prays and lot and appreciates being prayed for.   Janet shared some worries and fears that are coming up for her, she says her mind sometimes races around frustration or fear about \"what did I do to deserve this.\" She is not used to being in a hospital and it is a big stressor.  Janet has family support and feels joyful when talking about being outside and in nature. She has hopes to leave the hospital and get back to her home soon.  Janet would like regular  visits. She says she felt better after talking about her feelings and wants more conversation about bassam.    Plan: Unit  will continue to follow up. [facility] is available for more immediate support via consult.    Idalmis Greenwood  Staff    Pager     * [facility] available 24/7 for emergent requests/referrals, either by paging the on-call  or by entering an ASAP/STAT consult in [facility], which will also page the on-call .*    Assessment    Saw pt Teresa Sanderson per consult.    Patient/Family Understanding of Illness and Goals of Care - Janet says her doctor gave her a lot of hope today and she is worried that by coming to the hospital she might never get to leave.    Distress and Loss - Janet is used to being independent and does struggle with not being at home.    Strengths, Coping, and Resources - Janet has a lot of bassam in God and is being visited by family.     Meaning, Beliefs, and Spirituality - Janet does not have a bassam community she belongs to, she has a personal bassam/relationship with Sabianism God.    "

## 2025-04-08 NOTE — PLAN OF CARE
Goal Outcome Evaluation:      Plan of Care Reviewed With: patient, child    Overall Patient Progress: no changeOverall Patient Progress: no change     Alert/Oriented. Anxious after MRI was completed. Pain ranging from a 7 up to 10 out of 10 in her lower back. Relief with oral meds earlier in the shift. IV Dilaudid given at HS for 10 out 0f 10 pain. Reassessment pending. Able to ambulate with SBA to the bathroom. Oncoming RN will order walker to help with steadiness. Fair appetite. Working on pain control. Continue plan of care.

## 2025-04-08 NOTE — PROGRESS NOTES
[facility] Oncology Progress Note    Teresa Sanderson MRN# [id number]   Age: 49 year old YOB: 1975     Date of Admission:  4/6/2025    Home clinic: [facility]  Primary medical oncology provider: Jahaira Plunkett MD          Assessment and Plan:   Assessment:  50 yo female with ER positive, HER2 negative breast cancer metastasized to bones on treatment with abemaciclib and fulvestrant admitted with intractable low back and right hip pain.  Imaging consistent with progressive bone metastases including bilateral pedicle fracture at L4.    Plan:   Metastatic ER positive breast cancer:  Progressive bone metastases  - Explained to patient that [employer] NGS drawn on 3/5/2025 was canceled as they attempted 5x to have her sign the ABN, but were unable to reach this.  Recommend obtaining this.  - In the absence of NGS data, I recommend next line treatment with everolimus and fulvestrant.  We discussed everolimus is a pill taken daily.  We reviewed the potential side effects including fatigue, decreased appetite, nausea, vomiting, diarrhea, constipation, low blood counts, rash, hypertension, and hyperlipidemia.  We will plan to start everolimus on discharge.  She will continue on fulvestrant.  Last fulvestrant administered on 3/27; therefore next is not due until 4/24.  Will plan to obtain new baseline PET/CT on discharge and repeat 2 months after starting treatment with everolimus and fulvestrant.    2.  Bone metastases, L4 pedicle fracture, cancer associated pain:  - MRI L-spine confirms bilateral L4 pedicle fracture without cord compression.  - discussed with Dr. Mcwilliams of radiation oncology, while repeat radiation is feasible, it is not recommended at this time due to lack of soft tissue component and that the L4 pedicle fractures are likely to heal with time.  - Neurosurgery recs reviewed, no indication for surgery.  TLSO bracing when out of bed recommended.  Recommend no  bending/twisting, no lifting over 10 pounds.  Repeat lumbar XR in 6 weeks and repeat lumbar CT in 3 months.  Neursurg will schedule outpatient follow up.  - palliative medicine for pain control  - on once every 3 month Zometa, next due in 3 months.    3.  FELTON:  Likely secondary to abemaciclib, may be exacerbated by recent poor PO intake and CT IV contrast.  Known side effect.  - discontinuing abemaciclib as above  - encourage PO intake of fluids.  - avoid NSAIDs until resolved.    4.  Leukopenia/anemia:  Secondary to abemaciclib.  - discontinue abemaciclib as above  - continue to monitor.    5.  Left popliteal nonocclusive DVT:  Chronic and unchanged from prior.  - okay to continue PTA Xarelto          Interval History:   No acute events overnight.  Back pain improved from yesterday, but still present.  States slept better last night than the 3 nights prior.  Notes some mild sleepiness with the oxycodone.  Denies nausea or vomiting.  Notes the MRI was very difficult for her and inquires about whether she can have sedation with future MRIs.           Allergies:     Allergies   Allergen Reactions    Contrast Dye      Pt developed nausea after isovue 370 injection on 6/9/21             Medications:     Current Facility-Administered Medications   Medication Dose Route Frequency Provider Last Rate Last Admin    acetaminophen (TYLENOL) tablet 975 mg  975 mg Oral TID Anca Wallace MD   975 mg at 04/08/25 1357    artificial saliva (BIOTENE MT) solution 1 spray  1 spray Mouth/Throat Q1H PRN Jonny Berman MD        benzocaine-menthol (CHLORASEPTIC MAX) 15-10 MG lozenge 1 lozenge  1 lozenge Buccal Q1H PRN Jonny Berman MD        bisacodyl (DULCOLAX) suppository 10 mg  10 mg Rectal Daily PRN Jonny Berman MD        calcium carbonate (TUMS) chewable tablet 1,000 mg  1,000 mg Oral 4x Daily PRN Jonny Berman MD        diclofenac (VOLTAREN) 1 % topical gel 2 g  2 g Topical 4x Daily Severson, Hayley, MD   2 g at  04/08/25 1358    gabapentin (NEURONTIN) capsule 300 mg  300 mg Oral TID Anca Wallace MD   300 mg at 04/08/25 1357    guaiFENesin-dextromethorphan (ROBITUSSIN DM) 100-10 MG/5ML syrup 10 mL  10 mL Oral Q4H PRN Jonny Berman MD        HYDROmorphone (PF) (DILAUDID) injection 0.5 mg  0.5 mg Intravenous Q2H PRN Severson, Hayley, MD   0.5 mg at 04/08/25 1547    hydrOXYzine HCl (ATARAX) tablet 25 mg  25 mg Oral 4x Daily PRN Jonny Berman MD   25 mg at 04/07/25 1943    Lidocaine (LIDOCARE) 4 % Patch 3 patch  3 patch Transdermal Q24H Severson, Hayley, MD   3 patch at 04/07/25 0900    lidocaine (LMX4) cream   Topical Q1H PRN Jonny Berman MD        lidocaine 1 % 0.1-1 mL  0.1-1 mL Other Q1H PRN Jonny Berman MD        melatonin tablet 5 mg  5 mg Oral At Bedtime PRN Jonny Berman MD   5 mg at 04/07/25 0305    menthol-zinc oxide (CALMOSEPTINE) 0.44-20.6 % ointment OINT   Topical 4x Daily PRN Jonny Berman MD        methocarbamol (ROBAXIN) tablet 1,000 mg  1,000 mg Oral TID Severson, Hayley, MD   1,000 mg at 04/08/25 1357    naloxone (NARCAN) injection 0.2 mg  0.2 mg Intravenous Q2 Min PRN Micheline Morales, RPH        Or    naloxone (NARCAN) injection 0.4 mg  0.4 mg Intravenous Q2 Min PRN Farzad Moralesn, RPH        Or    naloxone (NARCAN) injection 0.2 mg  0.2 mg Intramuscular Q2 Min PRN Farzad Moralesn, RPH        Or    naloxone (NARCAN) injection 0.4 mg  0.4 mg Intramuscular Q2 Min PRN Micheline Morales, RPH        nicotine (NICODERM CQ) 14 MG/24HR 24 hr patch 1 patch  1 patch Transdermal Daily Margarita Muro MD   1 patch at 04/08/25 0912    OLANZapine (zyPREXA) tablet 2.5 mg  2.5 mg Oral Daily PRN Jonny Berman MD        OLANZapine (zyPREXA) tablet 5-10 mg  5-10 mg Oral At Bedtime PRN Jonny Berman MD        ondansetron (ZOFRAN ODT) ODT tab 4 mg  4 mg Oral Q6H PRN Jonny Berman MD   4 mg at 04/07/25 1420    Or    ondansetron (ZOFRAN) injection 4 mg  4 mg Intravenous Q6H PRN Jonny Berman MD        oxyCODONE  (ROXICODONE) tablet 5 mg  5 mg Oral Q3H PRN Anca Wallace MD   5 mg at 04/08/25 1542    oxyCODONE IR (ROXICODONE) tablet 10 mg  10 mg Oral 4x Daily Anca Wallace MD   10 mg at 04/08/25 1541    pantoprazole (PROTONIX) EC tablet 40 mg  40 mg Oral BID AC Jonny Berman MD   40 mg at 04/08/25 1540    Patient is already receiving anticoagulation with heparin, enoxaparin (LOVENOX), warfarin (COUMADIN)  or other anticoagulant medication   Does not apply Continuous PRN Jonny Berman MD        polyethylene glycol (MIRALAX) Packet 17 g  17 g Oral BID PRN Jonny Berman MD   17 g at 04/08/25 1547    prochlorperazine (COMPAZINE) injection 10 mg  10 mg Intravenous Q6H PRN Jonny Berman MD        Or    prochlorperazine (COMPAZINE) tablet 10 mg  10 mg Oral Q6H PRN Jonny Berman MD   10 mg at 04/08/25 1131    QUEtiapine (SEROquel) tablet 25-50 mg  25-50 mg Oral BID PRN Jonny Berman MD        QUEtiapine (SEROquel) tablet 500 mg  500 mg Oral At Bedtime Severson, Hayley, MD   500 mg at 04/07/25 2150    rivaroxaban ANTICOAGULANT (XARELTO) tablet 20 mg  20 mg Oral Daily with supper Jonny Berman MD   20 mg at 04/07/25 1601    senna-docusate (SENOKOT-S/PERICOLACE) 8.6-50 MG per tablet 1 tablet  1 tablet Oral BID PRN Jonny Berman MD        Or    senna-docusate (SENOKOT-S/PERICOLACE) 8.6-50 MG per tablet 2 tablet  2 tablet Oral BID PRN Jonny Berman MD        senna-docusate (SENOKOT-S/PERICOLACE) 8.6-50 MG per tablet 1 tablet  1 tablet Oral BID Jonny Berman MD   1 tablet at 04/07/25 2151    Or    senna-docusate (SENOKOT-S/PERICOLACE) 8.6-50 MG per tablet 2 tablet  2 tablet Oral BID Jonny Berman MD   2 tablet at 04/08/25 0913    sertraline (ZOLOFT) tablet 50 mg  50 mg Oral Daily Jonny Berman MD   50 mg at 04/08/25 0913    sodium chloride (PF) 0.9% PF flush 3 mL  3 mL Intracatheter Q8H MAICO Jonny Berman MD   3 mL at 04/08/25 0631    sodium chloride (PF) 0.9% PF flush 3 mL  3 mL Intracatheter q1  min prn Jonny Berman MD   3 mL at 04/07/25 0537    Vitamin D3 (CHOLECALCIFEROL) tablet 25 mcg  25 mcg Oral Daily Jonny Berman MD   25 mcg at 04/08/25 0914     Physical Exam:  General:  Well appearing adult female in NAD.  Alert and oriented x 3.  HEENT:  Normocephalic.  Sclera anicteric.  MMM.  Intermittently closing eyes during our conversation.  Chest:  Respirating and conversing comfortably on room air.  Abd:  Soft/NT/ND.    Ext:  No pitting edema of the bilateral lower extremities.   Neuro:  Cranial nerves grossly intact.  No tremors or dyskinetic movements.  Psych:  Mood and affect appear normal.           Data:   I personally reviewed and interpreted the below laboratories and images:     Latest Reference Range & Units 04/08/25 14:03   Sodium 135 - 145 mmol/L 140   Potassium 3.4 - 5.3 mmol/L 4.0   Chloride 98 - 107 mmol/L 102   Carbon Dioxide (CO2) 22 - 29 mmol/L 25   Urea Nitrogen 6.0 - 20.0 mg/dL 17.4   Creatinine 0.51 - 0.95 mg/dL 1.78 (H)   GFR Estimate >60 mL/min/1.73m2 34 (L)   Calcium 8.8 - 10.4 mg/dL 10.2   Anion Gap 7 - 15 mmol/L 13   (H): Data is abnormally high  (L): Data is abnormally low    4/7/2025 Thoracic and Lumbar Spine MRI:  Findings:  Thoracic Spine:   Within the limits of respiratory motion, there is no definite abnormal  signal in the thoracic spinal cord. Alignment of the thoracic vertebra  appears within normal limits. Redemonstration of metastatic lesion in  the vertebral body of T7 with replacement of the normal bone marrow.  No evidence of pathologic fracture. Additional more subtle scattered  regions of thoracic vertebral body marrow T1 hypointensity with  corresponding T2 and STIR hyperintensity, most conspicuous T8.     Mild multilevel thoracic facet hypertrophy and minimal disc bulges. No  high-grade thoracic spinal canal or neural foraminal stenosis.     Subtle signal abnormality in a posterior midthoracic left rib likely  corresponds to hypermetabolism on PET/CT (series  100, image 31).     Lumbar Spine:  There are 5 type lumbar vertebra, used for the purposes of this  dictation.  The tip of the conus is at L1. Grade 1 anterolisthesis of  L4-5.  Replacement of the normal fatty marrow of L4 and changes of  vertebroplasty. No significant vertebral body height loss. Mild edema  related to the bilateral L4 pedicle fractures better demonstrated on  the comparison CT examination. Similar but less pronounced marrow  signal changes in the left posterosuperior L5 vertebral body with  Schmorl's node deformity.  On a level by level basis, the findings are as follows:     L1-2: Left facet hypertrophy. No spinal canal or neural foraminal  stenosis.     L2-3:  No spinal canal or neural foraminal stenosis.     L3-4:  Mild disc bulge and bilateral facet hypertrophy. No spinal  canal stenosis. Mild bilateral neural foraminal narrowing.     L4-5:  Grade 1 anterolisthesis. Asymmetric left disc bulge with  moderate bilateral neural foraminal stenosis. Mild to moderate spinal  canal narrowing. Facet arthropathy.     L5-S1:  Minimal posterior disc bulge. Left greater than right facet  hypertrophy. Mild bilateral neural foraminal narrowing. No spinal  canal stenosis.     Metastatic lesions in the iliac bones, greater on the left.     The visualized paraspinous tissues anteriorly are unremarkable.     Moderate lumbar subcutaneous edema.                                                                      Impression:   1. Redemonstration of metastatic lesion of T7 without evidence of  acute fracture. Multiple additional subtle thoracic vertebral  metastases. No high-grade thoracic spinal canal or neural foraminal  stenosis.  2. No myelopathic cord signal.  3. Metastatic lesion of L4 with changes of vertebroplasty. No  vertebral body height loss. Bilateral L4 pedicle fractures better  demonstrated on CT.  4. Additional sclerotic lumbar vertebral metastases better  demonstrated on CT. Persistent metastatic  lesions in the iliac bones,  greater on the left.   5. Multilevel lumbar spondylosis greatest at L4-5 with moderate  bilateral neural foraminal stenosis.    4/7/2025 Bilateral Lower extremity Ultrasound:    FINDINGS:  In the right lower extremity, the common femoral, femoral, popliteal ,  peroneal and posterior tibial veins demonstrate normal compressibility  and blood flow.     In the left lower extremity, the common femoral, femoral, peroneal and  posterior tibial veins demonstrate normal compressibility and blood  flow.  The left popliteal vein is incompletely compressible. Nonocclusive  thrombus was present at this level on exam from 12/5/2024.                                                                      IMPRESSION:        1. Nonocclusive thrombus at the left popliteal vein.  2. No deep vein thrombosis in the right lower extremity.    I spent 55 minutes on the date of the encounter doing chart review, review of test results, interpretation of tests, patient visit, documentation, and discussion with other provider(s)       Jahaira Plunkett MD
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
## 2025-04-09 ENCOUNTER — APPOINTMENT (OUTPATIENT)
Dept: GENERAL RADIOLOGY | Facility: CLINIC | Age: 50
DRG: 543 | End: 2025-04-09
Attending: NURSE PRACTITIONER
Payer: MEDICARE

## 2025-04-09 LAB
ANION GAP SERPL CALCULATED.3IONS-SCNC: 10 MMOL/L (ref 7–15)
BACTERIA UR CULT: NORMAL
BUN SERPL-MCNC: 19.4 MG/DL (ref 6–20)
CALCIUM SERPL-MCNC: 9.4 MG/DL (ref 8.8–10.4)
CHLORIDE SERPL-SCNC: 102 MMOL/L (ref 98–107)
CREAT SERPL-MCNC: 1.51 MG/DL (ref 0.51–0.95)
EGFRCR SERPLBLD CKD-EPI 2021: 42 ML/MIN/1.73M2
GLUCOSE SERPL-MCNC: 148 MG/DL (ref 70–99)
HCO3 SERPL-SCNC: 25 MMOL/L (ref 22–29)
POTASSIUM SERPL-SCNC: 4.1 MMOL/L (ref 3.4–5.3)
RADIOLOGIST FLAGS: ABNORMAL
SODIUM SERPL-SCNC: 137 MMOL/L (ref 135–145)

## 2025-04-09 PROCEDURE — 80048 BASIC METABOLIC PNL TOTAL CA: CPT

## 2025-04-09 PROCEDURE — 250N000013 HC RX MED GY IP 250 OP 250 PS 637

## 2025-04-09 PROCEDURE — 36415 COLL VENOUS BLD VENIPUNCTURE: CPT

## 2025-04-09 PROCEDURE — 250N000013 HC RX MED GY IP 250 OP 250 PS 637: Performed by: STUDENT IN AN ORGANIZED HEALTH CARE EDUCATION/TRAINING PROGRAM

## 2025-04-09 PROCEDURE — 72080 X-RAY EXAM THORACOLMB 2/> VW: CPT | Mod: 26 | Performed by: STUDENT IN AN ORGANIZED HEALTH CARE EDUCATION/TRAINING PROGRAM

## 2025-04-09 PROCEDURE — 120N000005 HC R&B MS OVERFLOW UMMC

## 2025-04-09 PROCEDURE — 82374 ASSAY BLOOD CARBON DIOXIDE: CPT

## 2025-04-09 PROCEDURE — 250N000013 HC RX MED GY IP 250 OP 250 PS 637: Performed by: INTERNAL MEDICINE

## 2025-04-09 PROCEDURE — 82565 ASSAY OF CREATININE: CPT

## 2025-04-09 PROCEDURE — 99233 SBSQ HOSP IP/OBS HIGH 50: CPT | Mod: GC

## 2025-04-09 PROCEDURE — 250N000011 HC RX IP 250 OP 636: Mod: JZ | Performed by: STUDENT IN AN ORGANIZED HEALTH CARE EDUCATION/TRAINING PROGRAM

## 2025-04-09 PROCEDURE — 99232 SBSQ HOSP IP/OBS MODERATE 35: CPT | Performed by: INTERNAL MEDICINE

## 2025-04-09 PROCEDURE — 72080 X-RAY EXAM THORACOLMB 2/> VW: CPT

## 2025-04-09 RX ORDER — OXYCODONE HYDROCHLORIDE 10 MG/1
10 TABLET ORAL EVERY 4 HOURS PRN
Status: DISCONTINUED | OUTPATIENT
Start: 2025-04-09 | End: 2025-04-14 | Stop reason: HOSPADM

## 2025-04-09 RX ORDER — OXYCODONE HYDROCHLORIDE 10 MG/1
10 TABLET ORAL
Status: DISCONTINUED | OUTPATIENT
Start: 2025-04-09 | End: 2025-04-09

## 2025-04-09 RX ORDER — OXYCODONE HYDROCHLORIDE 10 MG/1
10 TABLET ORAL 3 TIMES DAILY
Status: DISCONTINUED | OUTPATIENT
Start: 2025-04-09 | End: 2025-04-10 | Stop reason: ALTCHOICE

## 2025-04-09 RX ADMIN — METHOCARBAMOL 1000 MG: 500 TABLET ORAL at 08:54

## 2025-04-09 RX ADMIN — HYDROMORPHONE HYDROCHLORIDE 0.5 MG: 1 INJECTION, SOLUTION INTRAMUSCULAR; INTRAVENOUS; SUBCUTANEOUS at 03:32

## 2025-04-09 RX ADMIN — DICLOFENAC SODIUM 2 G: 10 GEL TOPICAL at 20:09

## 2025-04-09 RX ADMIN — GABAPENTIN 300 MG: 300 CAPSULE ORAL at 05:50

## 2025-04-09 RX ADMIN — OXYCODONE HYDROCHLORIDE 5 MG: 5 TABLET ORAL at 08:22

## 2025-04-09 RX ADMIN — OLANZAPINE 2.5 MG: 2.5 TABLET, FILM COATED ORAL at 04:09

## 2025-04-09 RX ADMIN — METHOCARBAMOL 1000 MG: 500 TABLET ORAL at 14:18

## 2025-04-09 RX ADMIN — RIVAROXABAN 20 MG: 20 TABLET, FILM COATED ORAL at 17:56

## 2025-04-09 RX ADMIN — PANTOPRAZOLE SODIUM 40 MG: 40 TABLET, DELAYED RELEASE ORAL at 17:56

## 2025-04-09 RX ADMIN — DICLOFENAC SODIUM 2 G: 10 GEL TOPICAL at 14:18

## 2025-04-09 RX ADMIN — DICLOFENAC SODIUM 2 G: 10 GEL TOPICAL at 17:56

## 2025-04-09 RX ADMIN — POLYETHYLENE GLYCOL 3350 17 G: 17 POWDER, FOR SOLUTION ORAL at 09:05

## 2025-04-09 RX ADMIN — PANTOPRAZOLE SODIUM 40 MG: 40 TABLET, DELAYED RELEASE ORAL at 08:54

## 2025-04-09 RX ADMIN — OXYCODONE HYDROCHLORIDE 10 MG: 10 TABLET ORAL at 21:42

## 2025-04-09 RX ADMIN — METHOCARBAMOL 1000 MG: 500 TABLET ORAL at 20:09

## 2025-04-09 RX ADMIN — SENNOSIDES AND DOCUSATE SODIUM 2 TABLET: 50; 8.6 TABLET ORAL at 20:09

## 2025-04-09 RX ADMIN — ACETAMINOPHEN 975 MG: 325 TABLET, FILM COATED ORAL at 08:53

## 2025-04-09 RX ADMIN — QUETIAPINE FUMARATE 500 MG: 400 TABLET ORAL at 21:40

## 2025-04-09 RX ADMIN — GABAPENTIN 300 MG: 300 CAPSULE ORAL at 14:18

## 2025-04-09 RX ADMIN — DICLOFENAC SODIUM 2 G: 10 GEL TOPICAL at 08:57

## 2025-04-09 RX ADMIN — NICOTINE 1 PATCH: 14 PATCH, EXTENDED RELEASE TRANSDERMAL at 10:41

## 2025-04-09 RX ADMIN — GABAPENTIN 300 MG: 300 CAPSULE ORAL at 21:40

## 2025-04-09 RX ADMIN — ACETAMINOPHEN 975 MG: 325 TABLET, FILM COATED ORAL at 14:18

## 2025-04-09 RX ADMIN — SERTRALINE HYDROCHLORIDE 100 MG: 25 TABLET ORAL at 08:54

## 2025-04-09 RX ADMIN — ACETAMINOPHEN 975 MG: 325 TABLET, FILM COATED ORAL at 21:40

## 2025-04-09 RX ADMIN — HYDROXYZINE HYDROCHLORIDE 25 MG: 25 TABLET, FILM COATED ORAL at 02:57

## 2025-04-09 RX ADMIN — OXYCODONE HYDROCHLORIDE 10 MG: 10 TABLET ORAL at 05:50

## 2025-04-09 RX ADMIN — SENNOSIDES AND DOCUSATE SODIUM 2 TABLET: 50; 8.6 TABLET ORAL at 08:53

## 2025-04-09 RX ADMIN — HYDROMORPHONE HYDROCHLORIDE 0.5 MG: 1 INJECTION, SOLUTION INTRAMUSCULAR; INTRAVENOUS; SUBCUTANEOUS at 01:30

## 2025-04-09 RX ADMIN — Medication 25 MCG: at 08:54

## 2025-04-09 ASSESSMENT — ACTIVITIES OF DAILY LIVING (ADL)
ADLS_ACUITY_SCORE: 49
ADLS_ACUITY_SCORE: 49
ADLS_ACUITY_SCORE: 50
ADLS_ACUITY_SCORE: 49
ADLS_ACUITY_SCORE: 50
ADLS_ACUITY_SCORE: 50
ADLS_ACUITY_SCORE: 49
ADLS_ACUITY_SCORE: 50
ADLS_ACUITY_SCORE: 49
ADLS_ACUITY_SCORE: 50
ADLS_ACUITY_SCORE: 52
ADLS_ACUITY_SCORE: 49
ADLS_ACUITY_SCORE: 50
ADLS_ACUITY_SCORE: 49

## 2025-04-09 NOTE — PROGRESS NOTES
PALLIATIVE CARE PROGRESS NOTE  [facility]     Patient Name: Teresa Sanderson  Date of Admission: 4/6/2025        Recommendations & Counseling       GOALS OF CARE:   Life-prolonging without limits   Not discussed today with pt but she met with her oncologist and plans to start everolimus on discharge and will continue on fulvestrant. New baseline PET/CT to be done after discharge and repeat 2 months after starting treatment with everolimus and fulvestrant .      ADVANCE CARE PLANNING:  No health care directive on file. Per system policy, Surrogate Decision-makers for Patients With Diminished Decision-making Capacity offers guidance on possible decision-makers. Lives with daughter who is at bedside along with oldest son has been identified as a surrogate decision maker.   There is no POLST form on file,  recommend continued medical treatment and FULL CODE   Code status: Full Code    MEDICAL MANAGEMENT: all medication changes today ordered for you      Uncontrolled pain: This pain exacerbation is most likely due to bilateral pedicle fractures at L4 which were not present on PET/CT from 2/3/2025. The majority of her lesions within the lumbar spine are chronic in nature and have not significantly progressed since recent imaging.  Today pain has decreased compared to prior days.  She still complains of low back pain but not as severe. Janet normally stays up all night and sleeps much of the day and this happened last night and now has been sleeping much of the day today although was able to get up and have lunch and go down for x-rays but once back in bed, falling asleep.  Will therefore decrease opioids as indicated below.    Decrease scheduled Oxycodone 10mg TID scheduled  Increase Oxycodone 10mg q4hrs PRN   Discontinue Hydromorphone 0.5mg IV q2hrs PRN only use if oral opioids not controlling pain  gabapentin to 300mg TID - pt was not taking gabapentin at home  Tylenol 975mg  "TID scheduled and 325mg TID PRN  Continue Robaxin 1000mg TID scheduled   Continue lidocaine patch  Pt on anticoagulation so will not add NSAIDs at this time.        Anxiety/schizoaffective disorder, bipolar type on antipsychotic medications  Appreciate psychiatry consult - medications as per their recs:  increase zoloft 100mg every day  Continue seroquel 500mg at bedtime  Continue PRN zyprexa and hydroxyzine   Palliative care  will continue to support    PSYCHOSOCIAL/SPIRITUAL:  Family daughter and son at bedside   support greatly appreciated    Palliative Care will continue to follow. Thank you for the consult and allowing us to aid in the care of Teresa Sanderson.        Anca Wallace MD  Securely message with [employer] (more info)  Text page via [employer] Paging/Directory   If you are not sure who specifically to contact -- please text the \"[facility]\" voice group in [employer].        Assessment:           Teresa Sanderson is a 49 year old female with a past medical history of metastatic breast cancer dx 12/2020; widespread bone mets. S/p RT to lumbar spine and RFA/kyphoplasty L4 5/2021. She has been on several different lines of therapy (with some treatment interruptions).  -PET/CT in 04/2023 showed PD in two small bone metastasis in L pelvis, S/p 5 fractions RT to these lesions.   -Some lapses/breaks in treatment due to complicated social situation.  -Evidence of PD on 12/2023 PET scan.  Changed to Verzenio 01/2024 and Fulvestrant. Again s/p RT to low back and L hip 07/2024. She presented to the ED on 4/6 with 4-5 days of increasing low back pain, unable to control pain with pulm medications.    Since admission, CT scan showed new bilateral pedicle L4 fracture which is most likely causing increased low back pain.  No surgical intervention or radiation indicated at this time.     Today, the patient was seen for:  Progressive, metastatic breast cancer with known bony mets  Low back " pain  Cancer related pain  Anxiety  Insomnia  Support  Palliative care encounter           Interval History:     Multidisciplinary collaboration:  All notes reviewed including nursing notes, medicine, psychiatry, oncology, neurosurgery, radiation onc,  Neurosurgery consult reviewed - no surgery indications at this time, Recommend TLSO bracing when out of bed   Radiation onc consult reviewed - no radiation indications at this time  Psychiatry consult reviewed. Medication changes made  Spoke with primary medicine team  All events over past 24hrs reviewed    I reviewed this patient's medication profile and medications during this hospitalization.  Notable medications:  Acetaminophen 975mg TID  Voltaren gel QID  Gabapentin 300mg TID started 4/8  Lidocaine patch  Robaxin 1000mg TID  Oxycodone 10mg QID scheduled  Oxycodone 5mg q3hrs PRN - 3 doses (15mg) x 24 hrs  Hydromorphone 0.5mg IV q2hrs PRN - 3.5mg x 24 hrs    Zyorexa 2.5mg every day PRN and 5-10mg at bedtime PRN   Atarax 25mg PO  QID PRN   Seroquel 500mg at bedtime  Zoloft 100mg every day (increased from 50 on admission)      Senna 2 tabs BID       Patient/family narrative  Patient awake all night but according to her daughter and son, this is normal for her and then she will sleep all day until her daughter wakes her around 3 PM.  Pain has decreased since admission.  Still with racing thoughts at night and anxiety.       Review of Systems:     Besides above, ROS was reviewed and is unremarkable              Physical Exam:   Temp:  [97.7  F (36.5  C)-98.5  F (36.9  C)] 98.4  F (36.9  C)  Pulse:  [76-83] 76  Resp:  [16-18] 18  BP: ()/(67-74) 110/71  SpO2:  [96 %-99 %] 98 %  244 lbs 0 oz    Gen: Lying in bed, sleeping, able to arouse to voice but then falls back asleep, appears comfortable, in no acute distress,                 Current Problem List:   Active Problems:    Uncontrolled pain    Carcinoma of breast metastatic to bone, unspecified laterality (H)     FELTON (acute kidney injury)      Allergies   Allergen Reactions    Contrast Dye      Pt developed nausea after isovue 370 injection on 6/9/21             Data Reviewed:     Reviewed recent labs and pertinent imaging  ROUTINE ICU LABS (Last four results)  CMP  Recent Labs   Lab 04/09/25  0551 04/08/25  1403 04/07/25  0452 04/06/25  2333    140 139 139   POTASSIUM 4.1 4.0 3.8 4.1   CHLORIDE 102 102 104 102   CO2 25 25 23 25   ANIONGAP 10 13 12 12   * 126* 133* 118*   BUN 19.4 17.4 15.5 15.9   CR 1.51* 1.78* 1.17* 1.30*   GFRESTIMATED 42* 34* 57* 50*   NANDO 9.4 10.2 9.5 10.1   PROTTOTAL  --   --  7.3 7.6   ALBUMIN  --   --  3.8 3.9   BILITOTAL  --   --  0.2 0.2   ALKPHOS  --   --  86 93   AST  --   --  16 18   ALT  --   --  9 10     CBC  Recent Labs   Lab 04/07/25 0452 04/06/25  2333   WBC 3.7* 3.8*   RBC 3.08* 3.23*   HGB 10.2* 10.9*   HCT 31.5* 33.7*   * 104*   MCH 33.1* 33.7*   MCHC 32.4 32.3   RDW 13.0 12.9    200     INRNo lab results found in last 7 days.  Arterial Blood GasNo lab results found in last 7 days.    MRI lumbar spine:   Impression:   1. Redemonstration of metastatic lesion of T7 without evidence of  acute fracture. Multiple additional subtle thoracic vertebral  metastases. No high-grade thoracic spinal canal or neural foraminal  stenosis.  2. No myelopathic cord signal.  3. Metastatic lesion of L4 with changes of vertebroplasty. No  vertebral body height loss. Bilateral L4 pedicle fractures better  demonstrated on CT.  4. Additional sclerotic lumbar vertebral metastases better  demonstrated on CT. Persistent metastatic lesions in the iliac bones,  greater on the left.   5. Multilevel lumbar spondylosis greatest at L4-5 with moderate  bilateral neural foraminal stenosis.         Medical Decision Making       MANAGEMENT DISCUSSED with the following over the past 24 hours: Primary medicine team   NOTE(S)/MEDICAL RECORDS REVIEWED over the past 24 hours: Oncology, neurosurgery,  radiation oncology, nursing, medicine  Tests personally interpreted in the past 24 hours:  - MRI lumbar spine showing metastatic lesion of L4 with changes of vertebroplasty.  L4 pedicle fractures  Tests REVIEWED in the past 24 hours:  - BMP  - CBC  - Sodium urine 42, urine analysis  SUPPLEMENTAL HISTORY, in addition to the patient's history, over the past 24 hours obtained from:   - Son and daughter         Chart documentation was completed using Dragon voice-recognition software. Even though reviewed, some grammatical, spelling, and word errors may remain.

## 2025-04-09 NOTE — PROGRESS NOTES
[facility], [place]   Neurosurgery Progress Note:    Date of service: 4/9/2025    Assessment: Teresa Sanderson is a 49 year old female with history of metastatic breast cancer who presents with acute low back pain and evidence of bilateral L4 pedicle fractures on imaging    Clinically Significant Risk Factors Present on Admission                                 Plan:   -No acute indication for surgery at this time  -Do no recommend radiation therapy at this time.  There is a chance the pedicle fractures may potentially heal with time.   -Recommend TLSO bracing when out of bed  -Please upright AP/Lateral x-rays in brace to assess alignment  -Activity restrictions:  No bending or twisting, no lifting over 10 pounds.  Ambulate as tolerated.    -Repeat lumbar x-rays in 1 week and lumbar CT in 3 months  -Depending on prognosis and patient's goals of care, if she begins to slip at L4 we would consider placing pedicle screws for stabilization.  Will follow patient closely in clinic with serial imaging.    -Remainder of cares per primary team           RENO Lua, CNP  4/9/2025  Department of Neurosurgery  Pager: [phone]    I have spent a total of 25 minutes total time counseling, coordination, and chart review, over 50% of the time was spent in direct patient care.       Interval History:  Patient continues to note low back pain, especially in the morning.   Denies weakness, leg pain.            Objective:   Temp:  [97.7  F (36.5  C)-98.5  F (36.9  C)] 98.4  F (36.9  C)  Pulse:  [76-83] 76  Resp:  [16-18] 18  BP: ()/(67-74) 110/71  SpO2:  [96 %-99 %] 98 %  I/O last 3 completed shifts:  In: 1560 [P.O.:1560]  Out: 900 [Urine:900]    Gen: Appears comfortable, NAD  Neurologic:  - Alert & Oriented to person, place, time, and situation  - Follows commands briskly  - Speech fluent, spontaneous. No aphasia or dysarthria.  - No gaze preference. No apparent hemineglect.  -  PERRL, EOMI  - Strong eye closure, jaw clench, and cheek puff  - Face symmetric with sensation intact to light touch  - Palate elevates symmetrically, uvula midline, tongue protrudes midline  - Trapezii and sternocleidomastoid muscles 5/5 bilaterally  - No pronator drift     Del Tr Bi WE WF Gr   R 5 5 5 5 5 5   L 5 5 5 5 5 5    HF KE KF DF PF EHL   R 5 5 5 5 5 5   L 5 5 5 5 5 5     Reflexes 2+ throughout    Sensation intact and symmetric to light touch throughout    LABS  Recent Labs   Lab Test 04/07/25  0452 04/06/25  2333 03/27/25  1033   WBC 3.7* 3.8* 3.2*   HGB 10.2* 10.9* 10.6*   * 104* 103*    200 153       Recent Labs   Lab Test 04/09/25  0551 04/08/25  1403 04/07/25  0452    140 139   POTASSIUM 4.1 4.0 3.8   CHLORIDE 102 102 104   CO2 25 25 23   BUN 19.4 17.4 15.5   CR 1.51* 1.78* 1.17*   ANIONGAP 10 13 12   NANDO 9.4 10.2 9.5   * 126* 133*       IMAGING    No results found for this or any previous visit (from the past 24 hours).
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
## 2025-04-09 NOTE — PLAN OF CARE
/78 (BP Location: Right arm)   Pulse 76   Temp 98  F (36.7  C) (Oral)   Resp 16   Ht 1.829 m (6')   Wt 110.7 kg (244 lb)   SpO2 94%   BMI 33.09 kg/m    Pt alert and very talkative this am, ate all of her breakfast.  Received prn Oxycodone 5mg x1 and scheduled am medications.  Reassessed at 0900 and pain unchanged, but pt declined IV Dilaudid.  Came back at 0945 and pt was sleeping, very difficult to arouse.  Team assessed with writer at 1000.  Per pt's daughter, pt often will sleep 5751-0776 at home.  Et CO2 monitored, 38-45.  O2 90-96% RA, RR 14-16.  With much encouragement, pt aroused this afternoon-walked to BR-unable to void, went for xray and ate good lunch.  After lunch, pt went back to sleep.  MD notified of >6hrs since void, bladder scan and will reassess in 2hrs.  Pt c/o constipation-Senna and Miralax given, no BM this shift.  Bed alarm remains on.    1800-Pt voided 300ml with PVR 27.    Problem: Adult Inpatient Plan of Care  Goal: Plan of Care Review  Description: The Plan of Care Review/Shift note should be completed every shift.  The Outcome Evaluation is a brief statement about your assessment that the patient is improving, declining, or no change.  This information will be displayed automatically on your shiftnote.  Outcome: Not Progressing     Problem: Adult Inpatient Plan of Care  Goal: Optimal Comfort and Wellbeing  Outcome: Not Progressing     Problem: Pain Acute  Goal: Optimal Pain Control and Function  Outcome: Not Progressing   Goal Outcome Evaluation:

## 2025-04-09 NOTE — PROVIDER NOTIFICATION
"[facility] staff present for rounding and notified of pt who had been very talkative, awake this am is now very lethargic, needs prompts to wake.  Did eat all of breakfast.  Team assessed, daughter present and stated this is \"what happens at home.  She will sleep from 7am to about 2pm.\"  O2 monitoring with et CO2 started.  Team assessed and stated they will come again later to re-assess.    1610:  Pt hasn't voided since 8-9am, unable to void at 3pm.  Bladder scan for 24 ml.  DIMITRIOS Mendosa and A. Shabbir notified.  "

## 2025-04-09 NOTE — CONSULTS
Health Psychology Consultation Note  [facility]     Patient: Teresa Sanderson    Date of Admission: 4/6/2025    Attempted patient contact today 04/09/25 x3. Pt was unavailable due to sleeping soundly.    Pt chart reviewed by Writer.     Health Psychology Consult Teams will re-attempt pt contact in coming days.     Abhijeet Vidal, PhD,   Clinical Health Psychologist  Phone: [phone]

## 2025-04-09 NOTE — PROGRESS NOTES
1900 - 0700:    I: Monitored vitals and assessed pt status.   PRN: Atarax x1, IV Dilaudid x3    Neuro: A/Ox4. Anxious but pleasant. Given Atarax/Zyprexa for anxiety/sleep.  Cardiac: VSS.   Respiratory: Sating >95% on room air. Denies SOB.  GI/: Adequate output up to BR. Constipated, last BM 4/5. Patient states that she does not feel like she is passing gas. Team notified/aware. Still needs stool sample.  Diet: Regular diet. Good appetite. Denies nausea.  Skin: No new deficits noted.  LDAs: PIV SL  Activity: Up independently in room. Educated patient on wearing TLSO brace when OOB, no bending/twisting or lifting more than 10 pounds.  Pain: Rating pain 8-10/10 in lower back. Minimal relief with scheduled 10mg Oxy and 0.5 Dilaudid q2hr. Offered 5mg of Oxy but declined.     A:  Continue with pain management.    P: Continue to monitor Pt status and report changes to treatment team.

## 2025-04-10 PROCEDURE — 250N000012 HC RX MED GY IP 250 OP 636 PS 637: Performed by: INTERNAL MEDICINE

## 2025-04-10 PROCEDURE — 250N000011 HC RX IP 250 OP 636: Mod: JZ

## 2025-04-10 PROCEDURE — 250N000013 HC RX MED GY IP 250 OP 250 PS 637

## 2025-04-10 PROCEDURE — 90832 PSYTX W PT 30 MINUTES: CPT | Performed by: PSYCHOLOGIST

## 2025-04-10 PROCEDURE — 99233 SBSQ HOSP IP/OBS HIGH 50: CPT | Performed by: INTERNAL MEDICINE

## 2025-04-10 PROCEDURE — 250N000013 HC RX MED GY IP 250 OP 250 PS 637: Performed by: INTERNAL MEDICINE

## 2025-04-10 PROCEDURE — 120N000005 HC R&B MS OVERFLOW UMMC

## 2025-04-10 PROCEDURE — 250N000013 HC RX MED GY IP 250 OP 250 PS 637: Performed by: STUDENT IN AN ORGANIZED HEALTH CARE EDUCATION/TRAINING PROGRAM

## 2025-04-10 PROCEDURE — 99232 SBSQ HOSP IP/OBS MODERATE 35: CPT | Mod: GC

## 2025-04-10 RX ORDER — BUPRENORPHINE AND NALOXONE 2; .5 MG/1; MG/1
1 FILM, SOLUBLE BUCCAL; SUBLINGUAL 2 TIMES DAILY
Status: DISCONTINUED | OUTPATIENT
Start: 2025-04-10 | End: 2025-04-10 | Stop reason: DRUGHIGH

## 2025-04-10 RX ORDER — HYDROMORPHONE HYDROCHLORIDE 1 MG/ML
0.5 INJECTION, SOLUTION INTRAMUSCULAR; INTRAVENOUS; SUBCUTANEOUS
Status: COMPLETED | OUTPATIENT
Start: 2025-04-10 | End: 2025-04-10

## 2025-04-10 RX ORDER — HYDROMORPHONE HYDROCHLORIDE 1 MG/ML
0.5 INJECTION, SOLUTION INTRAMUSCULAR; INTRAVENOUS; SUBCUTANEOUS EVERY 6 HOURS PRN
Status: DISCONTINUED | OUTPATIENT
Start: 2025-04-10 | End: 2025-04-11

## 2025-04-10 RX ADMIN — SENNOSIDES AND DOCUSATE SODIUM 2 TABLET: 50; 8.6 TABLET ORAL at 08:23

## 2025-04-10 RX ADMIN — OXYCODONE HYDROCHLORIDE 10 MG: 10 TABLET ORAL at 18:16

## 2025-04-10 RX ADMIN — ACETAMINOPHEN 975 MG: 325 TABLET, FILM COATED ORAL at 22:05

## 2025-04-10 RX ADMIN — DICLOFENAC SODIUM 2 G: 10 GEL TOPICAL at 13:26

## 2025-04-10 RX ADMIN — OXYCODONE HYDROCHLORIDE 10 MG: 10 TABLET ORAL at 13:24

## 2025-04-10 RX ADMIN — BISACODYL 10 MG: 10 SUPPOSITORY RECTAL at 16:54

## 2025-04-10 RX ADMIN — OXYCODONE HYDROCHLORIDE 10 MG: 10 TABLET ORAL at 22:05

## 2025-04-10 RX ADMIN — DICLOFENAC SODIUM 2 G: 10 GEL TOPICAL at 20:08

## 2025-04-10 RX ADMIN — LIDOCAINE 4% 3 PATCH: 40 PATCH TOPICAL at 08:22

## 2025-04-10 RX ADMIN — Medication 25 MCG: at 08:24

## 2025-04-10 RX ADMIN — GABAPENTIN 300 MG: 300 CAPSULE ORAL at 13:24

## 2025-04-10 RX ADMIN — DICLOFENAC SODIUM 2 G: 10 GEL TOPICAL at 08:27

## 2025-04-10 RX ADMIN — ACETAMINOPHEN 975 MG: 325 TABLET, FILM COATED ORAL at 06:54

## 2025-04-10 RX ADMIN — PANTOPRAZOLE SODIUM 40 MG: 40 TABLET, DELAYED RELEASE ORAL at 08:23

## 2025-04-10 RX ADMIN — OXYCODONE HYDROCHLORIDE 10 MG: 10 TABLET ORAL at 03:23

## 2025-04-10 RX ADMIN — METHOCARBAMOL 1000 MG: 500 TABLET ORAL at 20:06

## 2025-04-10 RX ADMIN — METHOCARBAMOL 1000 MG: 500 TABLET ORAL at 13:24

## 2025-04-10 RX ADMIN — GABAPENTIN 300 MG: 300 CAPSULE ORAL at 22:05

## 2025-04-10 RX ADMIN — HYDROXYZINE HYDROCHLORIDE 25 MG: 25 TABLET, FILM COATED ORAL at 21:00

## 2025-04-10 RX ADMIN — BUPRENORPHINE HYDROCHLORIDE 150 MCG: 150 FILM, SOLUBLE BUCCAL at 20:06

## 2025-04-10 RX ADMIN — NICOTINE 1 PATCH: 14 PATCH, EXTENDED RELEASE TRANSDERMAL at 08:23

## 2025-04-10 RX ADMIN — OXYCODONE HYDROCHLORIDE 10 MG: 10 TABLET ORAL at 08:23

## 2025-04-10 RX ADMIN — QUETIAPINE FUMARATE 500 MG: 400 TABLET ORAL at 22:05

## 2025-04-10 RX ADMIN — PANTOPRAZOLE SODIUM 40 MG: 40 TABLET, DELAYED RELEASE ORAL at 16:59

## 2025-04-10 RX ADMIN — RIVAROXABAN 20 MG: 20 TABLET, FILM COATED ORAL at 16:58

## 2025-04-10 RX ADMIN — HYDROMORPHONE HYDROCHLORIDE 0.5 MG: 1 INJECTION, SOLUTION INTRAMUSCULAR; INTRAVENOUS; SUBCUTANEOUS at 04:25

## 2025-04-10 RX ADMIN — ACETAMINOPHEN 975 MG: 325 TABLET, FILM COATED ORAL at 14:18

## 2025-04-10 RX ADMIN — METHOCARBAMOL 1000 MG: 500 TABLET ORAL at 08:23

## 2025-04-10 RX ADMIN — POLYETHYLENE GLYCOL 3350 17 G: 17 POWDER, FOR SOLUTION ORAL at 10:09

## 2025-04-10 RX ADMIN — SERTRALINE HYDROCHLORIDE 100 MG: 25 TABLET ORAL at 08:24

## 2025-04-10 RX ADMIN — GABAPENTIN 300 MG: 300 CAPSULE ORAL at 06:54

## 2025-04-10 ASSESSMENT — ACTIVITIES OF DAILY LIVING (ADL)
ADLS_ACUITY_SCORE: 52
ADLS_ACUITY_SCORE: 55
ADLS_ACUITY_SCORE: 52
ADLS_ACUITY_SCORE: 55
ADLS_ACUITY_SCORE: 52
ADLS_ACUITY_SCORE: 55
ADLS_ACUITY_SCORE: 52
ADLS_ACUITY_SCORE: 55
ADLS_ACUITY_SCORE: 52

## 2025-04-10 NOTE — PLAN OF CARE
"Afebrile. Desating to 89% on RA early this morning, on 1.5L NC now. OVSS. Alert and orientated. Continues to be sleepy at times, but awakens easily. Pain still isn't controlled very well. Scheduled pain medications given and prn oxycodone given x1.  Continues to rate pain 8-10/10. Provider notified for uncontrolled pain, 1x order of IV 0.5 mg IV dilaudid given. Still rating pain 9/10 when awake, now saying pain is 7/10 about an hour and half after dilaudid given. Able to sleep tonight. Per patient she is feeling very overwhelmed with everything, may benefit from a social work consult or  support. Up with SBA. Bed alarm on. Patient calling out appropriately.         Problem: Adult Inpatient Plan of Care  Goal: Plan of Care Review  Description: The Plan of Care Review/Shift note should be completed every shift.  The Outcome Evaluation is a brief statement about your assessment that the patient is improving, declining, or no change.  This information will be displayed automatically on your shiftnote.  Outcome: Not Progressing  Goal: Patient-Specific Goal (Individualized)  Description: You can add care plan individualizations to a care plan. Examples of Individualization might be:  \"Parent requests to be called daily at 9am for status\", \"I have a hard time hearing out of my right ear\", or \"Do not touch me to wake me up as it startlesme\".  Outcome: Not Progressing  Goal: Absence of Hospital-Acquired Illness or Injury  Outcome: Not Progressing  Intervention: Identify and Manage Fall Risk  Recent Flowsheet Documentation  Taken 4/10/2025 7277 by Esther Thompson, RN  Safety Promotion/Fall Prevention: safety round/check completed  Taken 4/10/2025 0146 by Esther Thompson, RN  Safety Promotion/Fall Prevention:   safety round/check completed   activity supervised   assistive device/personal items within reach   clutter free environment maintained   nonskid shoes/slippers when out of bed   patient and family education   " room organization consistent  Taken 4/10/2025 0110 by Esther Thompson RN  Safety Promotion/Fall Prevention: safety round/check completed  Taken 4/9/2025 2330 by Esther Thompson RN  Safety Promotion/Fall Prevention: safety round/check completed  Taken 4/9/2025 2130 by Esther Thompson RN  Safety Promotion/Fall Prevention: safety round/check completed  Taken 4/9/2025 2005 by Esther Thompson RN  Safety Promotion/Fall Prevention:   safety round/check completed   activity supervised   assistive device/personal items within reach   clutter free environment maintained   nonskid shoes/slippers when out of bed   patient and family education   room organization consistent  Intervention: Prevent Skin Injury  Recent Flowsheet Documentation  Taken 4/9/2025 2005 by Esther Thompson RN  Body Position: position changed independently  Skin Protection:   adhesive use limited   protective footwear used  Intervention: Prevent Infection  Recent Flowsheet Documentation  Taken 4/9/2025 2005 by Esther Thompson RN  Infection Prevention:   equipment surfaces disinfected   hand hygiene promoted   personal protective equipment utilized   rest/sleep promoted   single patient room provided   visitors restricted/screened  Goal: Optimal Comfort and Wellbeing  Outcome: Not Progressing  Intervention: Monitor Pain and Promote Comfort  Recent Flowsheet Documentation  Taken 4/10/2025 0318 by Esther Thompson RN  Pain Management Interventions: medication (see MAR)  Taken 4/9/2025 2139 by Esther Thompson RN  Pain Management Interventions: medication (see MAR)  Taken 4/9/2025 2005 by Esther Thompson RN  Pain Management Interventions: medication (see MAR)  Goal: Readiness for Transition of Care  Outcome: Not Progressing     Problem: Pain Acute  Goal: Optimal Pain Control and Function  Outcome: Not Progressing  Intervention: Optimize Psychosocial Wellbeing  Recent Flowsheet Documentation  Taken 4/9/2025 2005 by Esther Thompson RN  Diversional  Activities: television  Intervention: Develop Pain Management Plan  Recent Flowsheet Documentation  Taken 4/10/2025 0318 by Esther Thompson, RN  Pain Management Interventions: medication (see MAR)  Taken 4/9/2025 2139 by Esther Thompson, RN  Pain Management Interventions: medication (see MAR)  Taken 4/9/2025 2005 by Esther Thompson RN  Pain Management Interventions: medication (see MAR)  Intervention: Prevent or Manage Pain  Recent Flowsheet Documentation  Taken 4/9/2025 2005 by Esther Thompson RN  Sleep/Rest Enhancement:   awakenings minimized   medication   regular sleep/rest pattern promoted  Medication Review/Management: medications reviewed   Goal Outcome Evaluation:

## 2025-04-10 NOTE — TELEPHONE ENCOUNTER
SPINE PATIENTS - NEW PROTOCOL PREVISIT    RECORDS RECEIVED FROM: Internal    REASON FOR VISIT: Acute low back pain and evidence of bilateral L4 pedicle fractures   PROVIDER: Renetta Miranda PA-C   DATE OF APPT: 4/17/25 @ 10:30 am    NOTES (FOR ALL VISITS) STATUS DETAILS   OFFICE NOTE from referring provider Internal [facility] Referral    OFFICE NOTE from other specialist Internal 3/27/25, 2/5/25 Alee De Los Santos PA-C @[facility]    3/4/25, 12/24/24 Lavonne Frazier MD @[facility]    2/27/25 Jahaira Plunkett MD @[facility]    1/21/25, 10/22/24 Ayanna Tellez DO @[facility]     DISCHARGE SUMMARY from hospital Internal 4/6/25-Present (as of 4/10/25) Jonny Livingston MD @[facility]    2/18/25 Jerad Méndez MD @[facility]     MEDICATION LIST Internal    IMAGING  (FOR ALL VISITS)     MRI (HEAD, NECK, SPINE) Internal [facility]  4/7/25 MR Thoracic Spine  4/7/25 MR Lumbar Spine     XRAY (SPINE) *NEUROSURGERY* Internal [facility]  ([place]) 4/17/25 XR Lumbar Spine  4/9/25 XR Thoracic Lumbar Standing     CT (HEAD, NECK, SPINE) Internal [facility]  4/6/25 CT Pelvis Bone  4/6/25 CT Lumbar Spine  2/18/25 CT Bone Biopsy

## 2025-04-10 NOTE — PROGRESS NOTES
PALLIATIVE CARE PROGRESS NOTE  [facility]     Patient Name: Teresa Sanderson  Date of Admission: 4/6/2025        Recommendations & Counseling       GOALS OF CARE:   Life-prolonging without limits   Not discussed today with pt but she met with her oncologist and plans to start everolimus on discharge and will continue on fulvestrant. New baseline PET/CT to be done after discharge and repeat 2 months after starting treatment with everolimus and fulvestrant .      ADVANCE CARE PLANNING:  No health care directive on file. Per system policy, Surrogate Decision-makers for Patients With Diminished Decision-making Capacity offers guidance on possible decision-makers. Lives with daughter who is at bedside along with oldest son has been identified as a surrogate decision maker.   There is no POLST form on file,  recommend continued medical treatment and FULL CODE   Code status: Full Code    MEDICAL MANAGEMENT: all medication changes today ordered for you      Acute on chronic back pain: Pain exacerbation is most likely due to bilateral pedicle fractures at L4 which were not present on PET/CT from 2/3/2025. The majority of her lesions within the lumbar spine are chronic in nature and have not significantly progressed since recent imaging.    Today patient is much more alert than yesterday and pain is better controlled than it was on admission but still very painful for patient.  Patient has had several years of pain, some related to metastatic lesions in spine and other pain with unclear etiology.  Patient had been tried on a Butrans patch without control of pain.  She had been on oxycodone without good control of her pain and difficult to get a clear history of how much oxycodone she would take on a regular basis.  I do believe that a longer acting opioid would benefit in pain control and would like to try Suboxone as this has less side effects than other opioids.  Will start a  "slow induction of buprenorphine while continuing oxycodone as needed.  Goal would be to have pain control on Suboxone film twice daily.    Start belbuca as indicated below for pain management, NOT for opioid use disorder  Start Belbuca 150mcg BID  Discontinue scheduled Oxycodone 10mg TID scheduled  Continue oxycodone 10mg q4hrs PRN   Continue gabapentin to 300mg TID (started on admission 4/6) - pt was not taking gabapentin at home  Continue Tylenol 975mg TID scheduled and 325mg TID PRN  Continue Robaxin 1000mg TID scheduled   Continue lidocaine patch  Pt on anticoagulation so will not add NSAIDs at this time.        Anxiety/schizoaffective disorder, bipolar type on antipsychotic medications  Appreciate psychiatry consult - medications as per their recs:  increase zoloft 100mg every day  Continue seroquel 500mg at bedtime  Continue PRN zyprexa and hydroxyzine   Palliative care  will continue to support    PSYCHOSOCIAL/SPIRITUAL:  Family daughter at bedside today   support greatly appreciated    Palliative Care will continue to follow. Thank you for the consult and allowing us to aid in the care of Teresa Sanderson.        Anca Wallace MD  Securely message with [employer] (more info)  Text page via [facility] Paging/Directory   If you are not sure who specifically to contact -- please text the \"Palliative Care [facility]\" voice group in [employer].        Assessment:           Teresa Sanderson is a 49 year old female with a past medical history of metastatic breast cancer dx 12/2020; widespread bone mets. S/p RT to lumbar spine and RFA/kyphoplasty L4 5/2021. She has been on several different lines of therapy (with some treatment interruptions).  -PET/CT in 04/2023 showed PD in two small bone metastasis in L pelvis, S/p 5 fractions RT to these lesions.   -Some lapses/breaks in treatment due to complicated social situation.  -Evidence of PD on 12/2023 PET scan.  Changed to Verzenio 01/2024 and Fulvestrant. Again " s/p RT to low back and L hip 07/2024. She presented to the ED on 4/6 with 4-5 days of increasing low back pain, unable to control pain with pulm medications.    Since admission, CT scan showed new bilateral pedicle L4 fracture which is most likely causing increased low back pain.  No surgical intervention or radiation indicated at this time.     Today, the patient was seen for:  Progressive, metastatic breast cancer with known bony mets  Low back pain  Cancer related pain  Anxiety  Insomnia  Support  Palliative care encounter           Interval History:     Multidisciplinary collaboration:  All notes reviewed including nursing, medicine, pharmacy, health psychology  Spoke with primary medicine team  Reviewed all events over the past 24 hours  No acute events overnight    I reviewed this patient's medication profile and medications during this hospitalization.  Notable medications:  Acetaminophen 975mg TID  Voltaren gel QID  Gabapentin 300mg TID started 4/8  Lidocaine patch  Robaxin 1000mg TID  Oxycodone 10mg TID scheduled  Oxycodone 10mg q3hrs PRN -15 x 24 hrs  Hydromorphone 0.5mg IV q2hrs PRN was discontinued but patient required 1 dose for better pain control overnight-     Zyorexa 2.5mg every day PRN and 5-10mg at bedtime PRN   Atarax 25mg PO  QID PRN   Seroquel 500mg at bedtime  Zoloft 100mg every day (increased from 50 on admission)      Senna 2 tabs BID       Patient/family narrative  Patient was able to sleep last night and is feeling more alert and calmer today.  Still with significant back pain.  Hoping to be able to get back to her regular life and not requiring help as she really wants to be able to do things on her own.       Review of Systems:     Besides above, ROS was reviewed and is unremarkable              Physical Exam:   Temp:  [97.7  F (36.5  C)-98.1  F (36.7  C)] 98.1  F (36.7  C)  Pulse:  [67-87] 87  Resp:  [10-16] 12  BP: (102-113)/(68-93) 105/74  SpO2:  [89 %-100 %] 92 %  246 lbs 1.6  oz    Gen: Lying in bed, alert, engaged, no increased work of breathing, appears comfortable, in no acute distress, sensorium intact                 Current Problem List:   Active Problems:    Uncontrolled pain    Carcinoma of breast metastatic to bone, unspecified laterality (H)    FELTON (acute kidney injury)      Allergies   Allergen Reactions    Contrast Dye      Pt developed nausea after isovue 370 injection on 6/9/21             Data Reviewed:     Reviewed recent labs and pertinent imaging  ROUTINE ICU LABS (Last four results)  CMP  Recent Labs   Lab 04/09/25  0551 04/08/25  1403 04/07/25  0452 04/06/25  2333    140 139 139   POTASSIUM 4.1 4.0 3.8 4.1   CHLORIDE 102 102 104 102   CO2 25 25 23 25   ANIONGAP 10 13 12 12   * 126* 133* 118*   BUN 19.4 17.4 15.5 15.9   CR 1.51* 1.78* 1.17* 1.30*   GFRESTIMATED 42* 34* 57* 50*   NANDO 9.4 10.2 9.5 10.1   PROTTOTAL  --   --  7.3 7.6   ALBUMIN  --   --  3.8 3.9   BILITOTAL  --   --  0.2 0.2   ALKPHOS  --   --  86 93   AST  --   --  16 18   ALT  --   --  9 10     CBC  Recent Labs   Lab 04/07/25  0452 04/06/25  2333   WBC 3.7* 3.8*   RBC 3.08* 3.23*   HGB 10.2* 10.9*   HCT 31.5* 33.7*   * 104*   MCH 33.1* 33.7*   MCHC 32.4 32.3   RDW 13.0 12.9    200     INRNo lab results found in last 7 days.  Arterial Blood GasNo lab results found in last 7 days.    X-ray thoracic lumbar standing 9/4:  Impression:   1. Bilateral L4 pedicle fractures. Interval 7 mm anterolisthesis of L4  on L5 concerning for instability.  2. Multilevel degenerative changes of the lumbar spine, pronounced at  L4-5 and L5-S1.       Thank you for the opportunity to continue to participate in the care of this patient and family.  Please feel free to contact on-call palliative provider with any emergent needs.  We can be reached via Securely message with the Vocera Web Console (learn more here)    Anca Wallace MD / Palliative Medicine     Medical Decision Making               I  personally spent 55 minutes spent on the date of the encounter doing chart review, history and exam, documentation and further activities as noted above.

## 2025-04-10 NOTE — PROVIDER NOTIFICATION
Provider notified (Michelle Schmid)  'Patient here for uncontrolled pain. Scheduled pain medications given at bedtime and just took prn oxycodone. Still rating pain at 10/10 when awake and in tears now. Could we try a dose of IV dilaudid or give the oxycodone more time to kick in?'    Per provider- 1x dose 5mg IV dilaudid ordered

## 2025-04-10 NOTE — CONSULTS
Discharge Pharmacy Test Claim    Patient has pharmacy benefits through [employer] Medicare Part D plan. Requested test claims and expected copays listed below.    Test Claim Copay Notes   buprenorphine patch 0.00    buprenorphine-naloxone film 0.00    methadone tabs 0.00    oxycontin tabs not covered xtampza preferred     Liz Kris  [facility] Pharmacy Liaison, [place]  Ph: [phone]  Fax: [phone]  Available on Teams and Vocera  Disclaimer: Pharmacy test claims are estimates and may not reflect final costs. Suggested alternatives aim to be cost-effective and may not be therapeutically equivalent. This consult is informational and does not constitute medical advice. Clinical decisions should be made by qualified healthcare providers.

## 2025-04-10 NOTE — PLAN OF CARE
"A/OX4, VSS on RA. Pain 7/10 managed with scheduled tylenol, voltaren, robaxin, gabapentin. No BM since 4/5; BS+, passing gas. PRN Miralax given and had a couple prune juices. PIV SL. Good appetite. Up SBA with walker, bed alarm on for safety. Calling appropriately. TLSO brace when OOB. PT consult placed, not seen yet. Palliative saw patient today and ordered suboxone SL and discontinued scheduled oxycodone (PRN still available). Daughter here visiting. Resting between cares.     Problem: Adult Inpatient Plan of Care  Goal: Plan of Care Review  Description: The Plan of Care Review/Shift note should be completed every shift.  The Outcome Evaluation is a brief statement about your assessment that the patient is improving, declining, or no change.  This information will be displayed automatically on your shiftnote.  Outcome: Progressing  Flowsheets (Taken 4/10/2025 1326)  Plan of Care Reviewed With: patient  Overall Patient Progress: no change  Goal: Patient-Specific Goal (Individualized)  Description: You can add care plan individualizations to a care plan. Examples of Individualization might be:  \"Parent requests to be called daily at 9am for status\", \"I have a hard time hearing out of my right ear\", or \"Do not touch me to wake me up as it startlesme\".  Outcome: Progressing  Goal: Absence of Hospital-Acquired Illness or Injury  Outcome: Progressing  Intervention: Identify and Manage Fall Risk  Recent Flowsheet Documentation  Taken 4/10/2025 0900 by Lili Doll, RN  Safety Promotion/Fall Prevention: safety round/check completed  Taken 4/10/2025 0823 by Lili Doll, RN  Safety Promotion/Fall Prevention:   assistive device/personal items within reach   activity supervised   clutter free environment maintained   lighting adjusted   nonskid shoes/slippers when out of bed   mobility aid in reach   patient and family education   room near nurse's station   room organization consistent   safety round/check " completed  Intervention: Prevent Skin Injury  Recent Flowsheet Documentation  Taken 4/10/2025 0800 by Lili Doll RN  Body Position: position changed independently  Intervention: Prevent Infection  Recent Flowsheet Documentation  Taken 4/10/2025 0823 by Lili Doll RN  Infection Prevention:   equipment surfaces disinfected   hand hygiene promoted   personal protective equipment utilized   rest/sleep promoted   single patient room provided   visitors restricted/screened  Goal: Optimal Comfort and Wellbeing  Outcome: Not Progressing  Intervention: Monitor Pain and Promote Comfort  Recent Flowsheet Documentation  Taken 4/10/2025 0823 by Lili Doll RN  Pain Management Interventions: medication (see MAR)  Goal: Readiness for Transition of Care  Outcome: Not Progressing   Goal Outcome Evaluation:      Plan of Care Reviewed With: patient    Overall Patient Progress: no changeOverall Patient Progress: no change

## 2025-04-10 NOTE — CONSULTS
"SPIRITUAL HEALTH SERVICES  SPIRITUAL ASSESSMENT consult Note  [facility] ([place]) [facility]C  ON-CALL     REFERRAL SOURCE: Routine consult    Janet shared that she has been \"worrying\" lately, about her health, and about those that care for her (her two adult children). Janet's adult daughter lives with her with her young son, her adult son also takes turns caring for her.     She shared that she has been praying for \"more sleep\" and that she has been \"so tired\" lately, \"from all this worrying\".     Janet values prayer and requested some, which I provided. She expressed gratitude for the prayer and the visit, and requested more regular visits and prayer, I let her know I would communicate her desire to the spiritual care team.    Janet finds david and comfort in her cat, (Amadou).     PLAN: Will communicate care and request for further visits and prayer to spiritual care team. Spiritual health services remains available for any follow-up or requests. For further visits please place spiritual health consults.    Yany Russell, [facility]   Pager: [phone]    "

## 2025-04-10 NOTE — PROGRESS NOTES
VS normal range, continue to have back pain, after suppository Pt had large normal stool, stated feel better
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
## 2025-04-10 NOTE — CONSULTS
Health Psychology          Lianne Nava, Ph.D.,  [phone]  Makenna Marie, Ph.D.,  [phone]  Abhijeet Vidal, Ph.D.,  [phone]  Diana Meléndez, Ph.D.,  [phone]  Darius Heredia, Ph.D., , [facility] [phone]  Gunjan Levy, Ph.D.,  [phone]  Sangita Lorenzo, Ph.D., , [facility] [phone]    [facility], 3rd Floor  [address] [place] [address]       Inpatient Health Psychology Consultation     Date of Service: 04/10/25   Time in: 2:37 PM  Time out: 2:58 PM    BACKGROUND: Per EMR: Teresa Sanderson is a 49 year old female admitted on 04/06/25. 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     Health psychology consulted for assistance with health anxiety and racing thoughts. Completed chart review. Patient has been seen by psychiatry while inpatient as well. Patient has outpatient psychiatrist, Dr. Odette Peralta MD. Last visit with Dr. Peralta was February 10, 2025.     SUBJECTIVE: Entered patient's room and introduced myself, the health psychology service, and reason for consult. Patient agreeable to a therapeutic visit today. Supported patient in processing her thoughts and emotions about her current medical status. She acknowledges that she has been struggling with anxiety and fear related to her medical concerns. States that she is not ready for health decline and a decrease in her quality of life or for her children to be in a caregiver position for her. States that she was fearful that if she came to the hospital this time, she would not be going home. However, she stated that the pain was so significant that she decided to come to the hospital. States that her pain is quite distressing when not managed adequately.  "Patient notes feeling concerned about the caregiver burden on her children. States that she is concerned that her son has been bearing a lot of caregiver stress and that he needs a break. She states that her daughter will be stepping in to support as well and that they will rotate so one person does not absorb all the stress. Discussed supportive and protective factors that help reduce patient's anxiety and fear. She notes feeling comforted by routine, which gives her a sense of normalcy. Patient also expressed that taking a strengths-based approach has been helpful to her. She notes feeling discouraged by people focusing so much on the terminality of her illness. She states that she would like to focus on preserving as much quality of life, strength, and meaning as she can with whatever time she has left. Patient noted that her bassam, her children, and connecting with loved-ones give her a sense of meaning. She notes that the  who has visited from [facility] has been very helpful. Explored other skills that might support patient in managing her anxiety such as relaxation training and calming skills. She expressed interest in learning some of these strategies. Also discussed cognitive reappraisal strategies to challenge some of her fear-based thinking and how certain narratives could be re-framed from a strengths-based approach (e.g., \"using the brace to walk is scary\" vs \"the brace is a tool that helps me preserve my strength\"). Patient noted that these types of approaches are helpful for increasing her sense of empowerment. Advised that ROBIN Breen from Palliative Care is planning to meet patient tomorrow and continue therapeutic services. Identified potential targets for intervention, which include: relaxation training, pain management, meaning making/dignity preservation as patient contemplates existential concerns, completing a healthcare directive, caregiver support/services for " children, and creating structure/routine.     OBJECTIVE:   Mental Status Exam:   Appearance: Appropriate   Eye Contact: Good    Orientation: Yes, x4  Behavior/relationship to provider/demeanor: Cooperative, Engaged, and Pleasant  Motor Activity: normal or unremarkable  Mood (subjective report): Anxious  Affect (objective appearance): Appropriate  Speech Rate: Normal  Speech Volume: Normal  Speech Articulation: Normal  Speech Coherence: Normal  Speech Spontaneity: Normal  Thought Content: no evidence of suicidal or homicidal ideation and anxious/perseverative cognitions  Thought Process (associations): Logical, Linear, Goal directed, Perseverative, and somewhat over-productive  Thought Process (rate): Normal  Abnormal Perception: None  Attention/Concentration: Normal  Memory: Appears grossly intact   Fund of Knowledge: Appears within normal limits   Abstraction:  Normal  Insight:  Adequate  Judgment:   Adequate    ASSESSMENT: Patient with PMH significant for metastatic breast cancer and current admission for worsening pain in the context of progressive bone metastasis who would likely benefit from continued therapeutic support for managing anxiety in the context of terminal illness.      DIAGNOSES:     Anxiety    PLAN AND RECOMMENDATIONS:    Plan for health psychology to sign off at this time.   Palliative SW planning to meet with patient tomorrow for emotional and therapeutic support.   Please feel free to call or re-consult if urgent concerns arise or additional assistance is needed.     Sangita Lorenzo, Ph.D., , [facility]  Clinical Health Psychologist  Phone: [phone]   4/10/2025 2:37 PM

## 2025-04-11 ENCOUNTER — APPOINTMENT (OUTPATIENT)
Dept: PHYSICAL THERAPY | Facility: CLINIC | Age: 50
DRG: 543 | End: 2025-04-11
Payer: MEDICARE

## 2025-04-11 ENCOUNTER — DOCUMENTATION ONLY (OUTPATIENT)
Dept: ONCOLOGY | Facility: CLINIC | Age: 50
End: 2025-04-11
Payer: MEDICARE

## 2025-04-11 PROCEDURE — 250N000013 HC RX MED GY IP 250 OP 250 PS 637

## 2025-04-11 PROCEDURE — 97530 THERAPEUTIC ACTIVITIES: CPT | Mod: GP

## 2025-04-11 PROCEDURE — 120N000005 HC R&B MS OVERFLOW UMMC

## 2025-04-11 PROCEDURE — 250N000013 HC RX MED GY IP 250 OP 250 PS 637: Performed by: INTERNAL MEDICINE

## 2025-04-11 PROCEDURE — 250N000012 HC RX MED GY IP 250 OP 636 PS 637: Performed by: INTERNAL MEDICINE

## 2025-04-11 PROCEDURE — 99232 SBSQ HOSP IP/OBS MODERATE 35: CPT | Mod: GC

## 2025-04-11 PROCEDURE — 99233 SBSQ HOSP IP/OBS HIGH 50: CPT | Performed by: INTERNAL MEDICINE

## 2025-04-11 PROCEDURE — 250N000013 HC RX MED GY IP 250 OP 250 PS 637: Performed by: STUDENT IN AN ORGANIZED HEALTH CARE EDUCATION/TRAINING PROGRAM

## 2025-04-11 PROCEDURE — 97162 PT EVAL MOD COMPLEX 30 MIN: CPT | Mod: GP

## 2025-04-11 RX ORDER — NICOTINE 21 MG/24HR
1 PATCH, TRANSDERMAL 24 HOURS TRANSDERMAL DAILY
Status: DISCONTINUED | OUTPATIENT
Start: 2025-04-11 | End: 2025-04-14 | Stop reason: HOSPADM

## 2025-04-11 RX ADMIN — PANTOPRAZOLE SODIUM 40 MG: 40 TABLET, DELAYED RELEASE ORAL at 09:29

## 2025-04-11 RX ADMIN — BUPRENORPHINE HYDROCHLORIDE 150 MCG: 150 FILM, SOLUBLE BUCCAL at 09:30

## 2025-04-11 RX ADMIN — GABAPENTIN 300 MG: 300 CAPSULE ORAL at 14:29

## 2025-04-11 RX ADMIN — ACETAMINOPHEN 975 MG: 325 TABLET, FILM COATED ORAL at 14:28

## 2025-04-11 RX ADMIN — NICOTINE 1 PATCH: 14 PATCH, EXTENDED RELEASE TRANSDERMAL at 22:59

## 2025-04-11 RX ADMIN — SERTRALINE HYDROCHLORIDE 100 MG: 25 TABLET ORAL at 09:29

## 2025-04-11 RX ADMIN — ACETAMINOPHEN 975 MG: 325 TABLET, FILM COATED ORAL at 06:32

## 2025-04-11 RX ADMIN — LIDOCAINE 4% 3 PATCH: 40 PATCH TOPICAL at 11:38

## 2025-04-11 RX ADMIN — SENNOSIDES AND DOCUSATE SODIUM 2 TABLET: 50; 8.6 TABLET ORAL at 09:28

## 2025-04-11 RX ADMIN — OXYCODONE HYDROCHLORIDE 10 MG: 10 TABLET ORAL at 02:12

## 2025-04-11 RX ADMIN — DICLOFENAC SODIUM 2 G: 10 GEL TOPICAL at 18:48

## 2025-04-11 RX ADMIN — GABAPENTIN 300 MG: 300 CAPSULE ORAL at 22:06

## 2025-04-11 RX ADMIN — HYDROXYZINE HYDROCHLORIDE 25 MG: 25 TABLET, FILM COATED ORAL at 09:30

## 2025-04-11 RX ADMIN — QUETIAPINE FUMARATE 500 MG: 400 TABLET ORAL at 22:08

## 2025-04-11 RX ADMIN — OXYCODONE HYDROCHLORIDE 10 MG: 10 TABLET ORAL at 11:34

## 2025-04-11 RX ADMIN — OXYCODONE HYDROCHLORIDE 10 MG: 10 TABLET ORAL at 16:22

## 2025-04-11 RX ADMIN — RIVAROXABAN 20 MG: 20 TABLET, FILM COATED ORAL at 16:19

## 2025-04-11 RX ADMIN — NICOTINE 1 PATCH: 14 PATCH, EXTENDED RELEASE TRANSDERMAL at 09:32

## 2025-04-11 RX ADMIN — GABAPENTIN 300 MG: 300 CAPSULE ORAL at 06:33

## 2025-04-11 RX ADMIN — SENNOSIDES AND DOCUSATE SODIUM 2 TABLET: 50; 8.6 TABLET ORAL at 22:06

## 2025-04-11 RX ADMIN — OXYCODONE HYDROCHLORIDE 10 MG: 10 TABLET ORAL at 22:05

## 2025-04-11 RX ADMIN — OXYCODONE HYDROCHLORIDE 10 MG: 10 TABLET ORAL at 06:36

## 2025-04-11 RX ADMIN — Medication 25 MCG: at 09:29

## 2025-04-11 RX ADMIN — METHOCARBAMOL 1000 MG: 500 TABLET ORAL at 09:29

## 2025-04-11 RX ADMIN — ACETAMINOPHEN 975 MG: 325 TABLET, FILM COATED ORAL at 22:05

## 2025-04-11 RX ADMIN — METHOCARBAMOL 1000 MG: 500 TABLET ORAL at 22:05

## 2025-04-11 RX ADMIN — BUPRENORPHINE HYDROCHLORIDE 150 MCG: 150 FILM, SOLUBLE BUCCAL at 22:07

## 2025-04-11 RX ADMIN — METHOCARBAMOL 1000 MG: 500 TABLET ORAL at 14:29

## 2025-04-11 RX ADMIN — DICLOFENAC SODIUM 2 G: 10 GEL TOPICAL at 22:09

## 2025-04-11 RX ADMIN — PANTOPRAZOLE SODIUM 40 MG: 40 TABLET, DELAYED RELEASE ORAL at 16:19

## 2025-04-11 ASSESSMENT — ACTIVITIES OF DAILY LIVING (ADL)
ADLS_ACUITY_SCORE: 52
ADLS_ACUITY_SCORE: 53
ADLS_ACUITY_SCORE: 52
ADLS_ACUITY_SCORE: 52
ADLS_ACUITY_SCORE: 53
ADLS_ACUITY_SCORE: 52
ADLS_ACUITY_SCORE: 53
ADLS_ACUITY_SCORE: 53
ADLS_ACUITY_SCORE: 52
ADLS_ACUITY_SCORE: 52
ADLS_ACUITY_SCORE: 55
ADLS_ACUITY_SCORE: 52
ADLS_ACUITY_SCORE: 55
ADLS_ACUITY_SCORE: 53
ADLS_ACUITY_SCORE: 53
ADLS_ACUITY_SCORE: 55
ADLS_ACUITY_SCORE: 53
ADLS_ACUITY_SCORE: 52
ADLS_ACUITY_SCORE: 55
ADLS_ACUITY_SCORE: 53

## 2025-04-11 NOTE — PLAN OF CARE
[id number]  Alert and oriented x4, using call light appropriately.  PRN Oxy for generalized pain.  SBA when out of bed.  TLSO Brace when Out of Bed.  Pt has had 2 Bms  Voltaren applied in lower back.    Problem: Adult Inpatient Plan of Care  Goal: Absence of Hospital-Acquired Illness or Injury  Intervention: Identify and Manage Fall Risk  Recent Flowsheet Documentation  Taken 4/10/2025 1955 by Macario Segovia RN  Safety Promotion/Fall Prevention:   activity supervised   safety round/check completed   room organization consistent   room near nurse's station   nonskid shoes/slippers when out of bed   mobility aid in reach   lighting adjusted   increase visualization of patient   increased rounding and observation   clutter free environment maintained   assistive device/personal items within reach     Problem: Adult Inpatient Plan of Care  Goal: Absence of Hospital-Acquired Illness or Injury  Intervention: Prevent Infection  Recent Flowsheet Documentation  Taken 4/10/2025 2000 by Macario Segovia RN  Infection Prevention:   cohorting utilized   environmental surveillance performed   equipment surfaces disinfected   hand hygiene promoted   personal protective equipment utilized   rest/sleep promoted   single patient room provided   visitors restricted/screened     Problem: Pain Acute  Goal: Optimal Pain Control and Function  Intervention: Prevent or Manage Pain  Recent Flowsheet Documentation  Taken 4/10/2025 2000 by Macario Segovia, RN  Sensory Stimulation Regulation:   auditory stimulation minimized   care clustered   lighting decreased   quiet environment promoted   visual stimulation minimized  Sleep/Rest Enhancement:   awakenings minimized   comfort measures   consistent schedule promoted   natural light exposure provided   noise level reduced   regular sleep/rest pattern promoted   room darkened  Bowel Elimination Promotion:   adequate fluid intake promoted   ambulation promoted  Medication Review/Management:    medications reviewed   high-risk medications identified

## 2025-04-11 NOTE — PROGRESS NOTES
S: Pt was seen today at  for eval/fitting for a TLSO corset as ordered.     O/G: The objective/goal is to reduce pain and motion of the lumbar spine.    A: At this time I have fit pt with a size [place] 464 TLSO corset with Velcro closures.  The pt was instructed on donning/doffing; care and cleaning of the TLSO was explained.  Care was taken in selection of the proper size TLSO to insure an intimate fit, provide clearance at the rectus femoris, when sitting and to avoid impingement at the breast anteriorly and scapula posteriorly.  Upon completion of the initial fitting the pt had no complaints, and the fit of the orthosis appeared to be satisfactory at this time.    P: the pt was instructed to call if any problems or questions arise.   FARTUN Hernandez.

## 2025-04-11 NOTE — PLAN OF CARE
"Alert and oriented x 4. Complained of lower back pain. Oxycodone 10 mg given. Up to the BR with walker and SBA to void.  Problem: Adult Inpatient Plan of Care  Goal: Plan of Care Review  Description: The Plan of Care Review/Shift note should be completed every shift.  The Outcome Evaluation is a brief statement about your assessment that the patient is improving, declining, or no change.  This information will be displayed automatically on your shiftnote.  Outcome: Progressing  Goal: Patient-Specific Goal (Individualized)  Description: You can add care plan individualizations to a care plan. Examples of Individualization might be:  \"Parent requests to be called daily at 9am for status\", \"I have a hard time hearing out of my right ear\", or \"Do not touch me to wake me up as it startlesme\".  Outcome: Progressing  Goal: Absence of Hospital-Acquired Illness or Injury  Outcome: Progressing  Intervention: Identify and Manage Fall Risk  Recent Flowsheet Documentation  Taken 4/11/2025 0022 by Mylene Horta RN  Safety Promotion/Fall Prevention:   assistive device/personal items within reach   clutter free environment maintained   lighting adjusted   nonskid shoes/slippers when out of bed   room near nurse's station   safety round/check completed  Intervention: Prevent Skin Injury  Recent Flowsheet Documentation  Taken 4/11/2025 0022 by Mylene Horta RN  Body Position: position changed independently  Skin Protection:   adhesive use limited   protective footwear used  Intervention: Prevent and Manage VTE (Venous Thromboembolism) Risk  Recent Flowsheet Documentation  Taken 4/11/2025 0022 by Mylene Horta RN  VTE Prevention/Management: SCDs off (sequential compression devices)  Intervention: Prevent Infection  Recent Flowsheet Documentation  Taken 4/11/2025 0022 by Mylene Horta RN  Infection Prevention:   cohorting utilized   hand hygiene promoted   rest/sleep promoted  Goal: Optimal Comfort and " Wellbeing  Outcome: Progressing  Intervention: Monitor Pain and Promote Comfort  Recent Flowsheet Documentation  Taken 4/11/2025 0022 by Mylene Horta, RN  Pain Management Interventions:   distraction   quiet environment facilitated  Goal: Readiness for Transition of Care  Outcome: Progressing   Goal Outcome Evaluation:

## 2025-04-11 NOTE — CONSULTS
PALLIATIVE CARE SOCIAL WORK Progress Note   Location: [facility]      PCSW received consult to see Janet for emotional support and nonpharm for symptoms.   Janet was sleepy this afternoon at attempted visit. She fell asleep while I was introducing myself and role.     Plan: PCSW will attempt to visit again on Monday to assess what would be most helpful for Janet.     Clinical Social Work Interventions:   Assessment of palliative specific issues    Introduction of Palliative clinical social work interventions    ROBIN Carrillo  [facility], Palliative Care  Securely message with the [employer]oc[employer] Web Console (learn more here) or  Text page via [facility] Paging/Directory

## 2025-04-11 NOTE — PLAN OF CARE
"Assumed care of patient [id number]    Afebrile  Neuro: A&Ox4.   Cardiac: VSS.   Respiratory: Sating *>92% on RA.  GI/: Adequate urine output via toilet. Last BM 4/10  Diet/appetite: Tolerating regular diet. Eating well.  Endo: No BS checks ordered   Activity:  Assist of 1 with walker and gaitbelt, up to chair and in halls.  Pain: Reports 10/10 pain associated with lower back, see MAR for comprehensive pain medication administration.   Skin: No new deficits noted.  LDA's: LPIV, saline locked  RN Managed Protocols: None active at this time     Plan: Continue with POC. Notify primary team with changes.      Goal Outcome Evaluation:      Plan of Care Reviewed With: patient    Overall Patient Progress: improving    Overall Patient Progress: improving           Problem: Adult Inpatient Plan of Care  Goal: Plan of Care Review  Description: The Plan of Care Review/Shift note should be completed every shift.  The Outcome Evaluation is a brief statement about your assessment that the patient is improving, declining, or no change.  This information will be displayed automatically on your shiftnote.  Outcome: Progressing  Flowsheets (Taken 4/11/2025 1315)  Plan of Care Reviewed With: patient  Overall Patient Progress: improving  Goal: Patient-Specific Goal (Individualized)  Description: You can add care plan individualizations to a care plan. Examples of Individualization might be:  \"Parent requests to be called daily at 9am for status\", \"I have a hard time hearing out of my right ear\", or \"Do not touch me to wake me up as it startlesme\".  Outcome: Progressing  Goal: Absence of Hospital-Acquired Illness or Injury  Outcome: Progressing  Intervention: Identify and Manage Fall Risk  Recent Flowsheet Documentation  Taken 4/11/2025 1245 by Dorothy Estrada, RN  Safety Promotion/Fall Prevention:   activity supervised   clutter free environment maintained   nonskid shoes/slippers when out of bed   room near nurse's station   room " organization consistent   safety round/check completed  Taken 4/11/2025 0930 by Dorothy Estrada RN  Safety Promotion/Fall Prevention:   activity supervised   clutter free environment maintained   nonskid shoes/slippers when out of bed   room near nurse's station   room organization consistent   safety round/check completed  Intervention: Prevent Skin Injury  Recent Flowsheet Documentation  Taken 4/11/2025 1245 by Dorothy Estrada RN  Body Position: position changed independently  Taken 4/11/2025 0930 by Dorothy Estrada RN  Skin Protection:   adhesive use limited   incontinence pads utilized   protective footwear used   tubing/devices free from skin contact  Intervention: Prevent and Manage VTE (Venous Thromboembolism) Risk  Recent Flowsheet Documentation  Taken 4/11/2025 0930 by Dorothy Estrada RN  VTE Prevention/Management: (ambulating) SCDs off (sequential compression devices)  Intervention: Prevent Infection  Recent Flowsheet Documentation  Taken 4/11/2025 1245 by Dorothy Estrada RN  Infection Prevention:   cohorting utilized   environmental surveillance performed   equipment surfaces disinfected   hand hygiene promoted   rest/sleep promoted   single patient room provided  Taken 4/11/2025 0930 by Dorothy Estrada RN  Infection Prevention:   cohorting utilized   environmental surveillance performed   equipment surfaces disinfected   hand hygiene promoted   rest/sleep promoted   single patient room provided  Goal: Optimal Comfort and Wellbeing  Outcome: Progressing  Intervention: Monitor Pain and Promote Comfort  Recent Flowsheet Documentation  Taken 4/11/2025 0930 by Dorothy Estrada RN  Pain Management Interventions:   medication (see MAR)   distraction   cold applied   care clustered   rest  Goal: Readiness for Transition of Care  Outcome: Progressing     Problem: Pain Acute  Goal: Optimal Pain Control and Function  Outcome: Progressing  Intervention: Optimize Psychosocial  Wellbeing  Recent Flowsheet Documentation  Taken 4/11/2025 1245 by Dorohty Estrada, RN  Diversional Activities: television  Taken 4/11/2025 0930 by Dorothy Estrada RN  Diversional Activities: television  Intervention: Develop Pain Management Plan  Recent Flowsheet Documentation  Taken 4/11/2025 0930 by Dorothy Estrada RN  Pain Management Interventions:   medication (see MAR)   distraction   cold applied   care clustered   rest  Intervention: Prevent or Manage Pain  Recent Flowsheet Documentation  Taken 4/11/2025 1245 by Dorothy Estrada RN  Sleep/Rest Enhancement:   awakenings minimized   consistent schedule promoted   noise level reduced   regular sleep/rest pattern promoted   relaxation techniques promoted  Medication Review/Management:   medications reviewed   high-risk medications identified  Taken 4/11/2025 0930 by Dorothy Estrada RN  Sensory Stimulation Regulation:   auditory stimulation minimized   care clustered   lighting decreased   quiet environment promoted   television on  Sleep/Rest Enhancement:   awakenings minimized   consistent schedule promoted   noise level reduced   regular sleep/rest pattern promoted   relaxation techniques promoted  Medication Review/Management:   medications reviewed   high-risk medications identified

## 2025-04-11 NOTE — PROGRESS NOTES
PALLIATIVE CARE PROGRESS NOTE  [facility]     Patient Name: Teresa Sanderson  Date of Admission: 4/6/2025        Recommendations & Counseling       GOALS OF CARE:   Life-prolonging without limits   Teresa is looking forward to being able to return home and helps to be able to care for herself and have the pain controlled and then plans to start everolimus on discharge and will continue on fulvestrant. New baseline PET/CT to be done after discharge and repeat 2 months after starting treatment with everolimus and fulvestrant .      ADVANCE CARE PLANNING:  No health care directive on file. Per system policy, Surrogate Decision-makers for Patients With Diminished Decision-making Capacity offers guidance on possible decision-makers. Lives with daughter who is at bedside along with oldest son has been identified as a surrogate decision maker.   There is no POLST form on file,  recommend continued medical treatment and FULL CODE   Code status: Full Code    MEDICAL MANAGEMENT: all medication changes today ordered for you      Acute on chronic back pain: Pain exacerbation is most likely due to bilateral pedicle fractures at L4 which were not present on PET/CT from 2/3/2025. The majority of her lesions within the lumbar spine are chronic in nature and have not significantly progressed since recent imaging.    Today patient is alert and pain is better controlled than it was on admission.  She states that the pain is still significant but improving.    Patient has had several years of pain, some related to metastatic lesions in spine and other pain with unclear etiology.  Patient had been tried on a Butrans patch without control of pain.  She had been on oxycodone without good control of her pain and difficult to get a clear history of how much oxycodone she would take on a regular basis.  I do believe that a longer acting opioid would benefit in pain control and would like to  "try buprenorphine as this has less side effects than other opioids including less respiratory suppression, less constipation.  Awaiting pharmacy/insurance approval for Belbuca.  If it is not covered, will consider alternate agent    Belbuca as indicated below for pain management, NOT for opioid use disorder    Continue Belbuca 150mcg BID - Will increase if needed tomorrow once pt has been taking x 24 hrs (started 4/10 at night).   Continue oxycodone 10mg q4hrs PRN   Continue gabapentin to 300mg TID (started on admission 4/6) - pt was not taking gabapentin at home - GFR fluctuating between 34-57.  Will not increase gabapentin dosage at this time but if renal function remains stable as an outpatient, can consider increasing at that time.  Continue Tylenol 975mg TID scheduled and 325mg TID PRN  Continue Robaxin 1000mg TID scheduled   Continue lidocaine patch  Pt on anticoagulation so will not add NSAIDs at this time.        Anxiety/schizoaffective disorder, bipolar type on antipsychotic medications  Appreciate psychiatry consult - medications as per their recs:  increase zoloft 100mg every day  Continue seroquel 500mg at bedtime  Continue PRN zyprexa and hydroxyzine   Palliative care  will continue to support    PSYCHOSOCIAL/SPIRITUAL:  Family daughter at bedside today   support greatly appreciated    Palliative Care will continue to follow. Thank you for the consult and allowing us to aid in the care of Teresa Sanderson.        Anca Wallace MD  Securely message with Appe[employer] (more info)  Text page via [facility] Paging/Directory   If you are not sure who specifically to contact -- please text the \"Palliative Care [facility]\" voice group in Appe[employer] Here.        Assessment:           Teresa Sanderson is a 49 year old female with a past medical history of metastatic breast cancer dx 12/2020; widespread bone mets. S/p RT to lumbar spine and RFA/kyphoplasty L4 5/2021. She has been on several different lines of " therapy (with some treatment interruptions).  -PET/CT in 04/2023 showed PD in two small bone metastasis in L pelvis, S/p 5 fractions RT to these lesions.   -Some lapses/breaks in treatment due to complicated social situation.  -Evidence of PD on 12/2023 PET scan.  Changed to Verzenio 01/2024 and Fulvestrant. Again s/p RT to low back and L hip 07/2024. She presented to the ED on 4/6 with 4-5 days of increasing low back pain, unable to control pain with pulm medications.    Since admission, CT scan showed new bilateral pedicle L4 fracture which is most likely causing increased low back pain.  No surgical intervention or radiation indicated at this time.     Today, the patient was seen for:  Progressive, metastatic breast cancer with known bony mets  Low back pain  Cancer related pain  Anxiety  Insomnia  Support  Palliative care encounter           Interval History:     Multidisciplinary collaboration:  All notes reviewed including nursing, medicine, pharmacy, health psychology, physical therapy, spiritual health  Spoke with primary medicine team  Reviewed all events over the past 24 hours  No acute events overnight  Bm x 5    I reviewed this patient's medication profile and medications during this hospitalization.  Notable medications:  Acetaminophen 975mg TID  Voltaren gel QID  Gabapentin 300mg TID started 4/8  Lidocaine patch  Robaxin 1000mg TID  Belbuca 150mcg BID - started 4/10 in evening  Oxycodone 10mg q3hrs PRN -50 x 24 hrs  Hydromorphone 0.5mg IV q2hrs PRN - none used x 24 hrs    Zyorexa 2.5mg every day PRN and 5-10mg at bedtime PRN   Atarax 25mg PO  QID PRN   Seroquel 500mg at bedtime  Zoloft 100mg every day (increased from 50 on admission)      Senna 2 tabs BID       Patient/family narrative  Patient was able to walk down the vazquez today with physical therapy help.  She was very tired but able to do it.  Did have increased pain with walking.  Overall pain still present but better than on admission.        Review of Systems:     Besides above, ROS was reviewed and is unremarkable              Physical Exam:   Temp:  [97.6  F (36.4  C)-98.5  F (36.9  C)] 98.3  F (36.8  C)  Pulse:  [71-80] 80  Resp:  [12-20] 16  BP: (100-123)/(60-80) 101/80  SpO2:  [94 %-99 %] 94 %  246 lbs 12.8 oz    Gen: Alert, lying in bed, appears comfortable, no increased work of breathing, in no acute distress, engaged, appropriate, sensorium intact                 Current Problem List:   Active Problems:    Uncontrolled pain    Carcinoma of breast metastatic to bone, unspecified laterality (H)    FELTON (acute kidney injury)      Allergies   Allergen Reactions    Contrast Dye      Pt developed nausea after isovue 370 injection on 6/9/21             Data Reviewed:     Reviewed recent labs and pertinent imaging  ROUTINE ICU LABS (Last four results)  CMP  Recent Labs   Lab 04/09/25  0551 04/08/25  1403 04/07/25  0452 04/06/25  2333    140 139 139   POTASSIUM 4.1 4.0 3.8 4.1   CHLORIDE 102 102 104 102   CO2 25 25 23 25   ANIONGAP 10 13 12 12   * 126* 133* 118*   BUN 19.4 17.4 15.5 15.9   CR 1.51* 1.78* 1.17* 1.30*   GFRESTIMATED 42* 34* 57* 50*   NANDO 9.4 10.2 9.5 10.1   PROTTOTAL  --   --  7.3 7.6   ALBUMIN  --   --  3.8 3.9   BILITOTAL  --   --  0.2 0.2   ALKPHOS  --   --  86 93   AST  --   --  16 18   ALT  --   --  9 10     CBC  Recent Labs   Lab 04/07/25  0452 04/06/25  2333   WBC 3.7* 3.8*   RBC 3.08* 3.23*   HGB 10.2* 10.9*   HCT 31.5* 33.7*   * 104*   MCH 33.1* 33.7*   MCHC 32.4 32.3   RDW 13.0 12.9    200     INRNo lab results found in last 7 days.  Arterial Blood GasNo lab results found in last 7 days.    X-ray thoracic lumbar standing 4/9:   Impression:   1. Bilateral L4 pedicle fractures. Interval 7 mm anterolisthesis of L4  on L5 concerning for instability.  2. Multilevel degenerative changes of the lumbar spine, pronounced at  L4-5 and L5-S1.       Thank you for the opportunity to continue to participate in the  care of this patient and family.  Please feel free to contact on-call palliative provider with any emergent needs.  We can be reached via Securely message with the [employer] Web Console (learn more here)    Anca Wallace MD / Palliative Medicine     Medical Decision Making               I personally spent 55 minutes spent on the date of the encounter doing chart review, history and exam, documentation and further activities as noted above.

## 2025-04-11 NOTE — PROGRESS NOTES
[facility]    Progress Note - Medicine Service, GAIL TEAM 1       Date of Admission:  4/6/2025    Assessment & Plan   Teresa Sanderson is a 49 year old female admitted on 04/06/25. 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 She is approaching discharge with improving pain control.     Changes today:   - PT recommending home with 24/hr assist or TCU  - OT consult placed per PT rec  - Buprinorphine increased per Palliative care (outpatient followup arranged for 4/22)      # Metastatic breast cancer to bone  # Cancer-associated pain  # Bilateral L4 pedicle fractures. Interval 7 mm anterolisthesis of L4 on L5 concerning for instability.  PTA was on treatment with abemaciclib and fulvestrant admitted with intractable low back and right hip pain. Imaging consistent with progressive bone metastases including bilateral pedicle fracture at L4.  - Oncology consulted - will switch chemo on discharge  - Palliative consulted for assistance with pain management   - Neurosurgery consulted for pedicle fracture and consideration of brace, activity restrictions   - Recommend TLSO bracing when out of bed  - Activity restrictions:  No bending or twisting, no lifting over 10 pounds.  Ambulate as tolerated.    - Outpatient follow up in 1 week from 04/09/25 with x-rays   - Radiation oncology consulted for possible role of palliative radiation - will wait to see what her outpatient PET shows  - Pain regimen as follows (appreciate palliative's assistance):   - Switch scheduled oxycodone to Belbuca, continue PRN oxycodone   - Gabapentin 300 mg TID  - Diclofenac 1% topical   - Lidocaine patch  - Methocarbamol 1g TID    #Limited mobility  #Deconditioning  -PT and OT  -Discharge home with  24/hr assist vs TCU    # H/o DVT and PE  # SOB, resolved  Not currently requiring O2, normal BNP, troponin and CXR on admission without tachycardia or chest pain reassuring against PE. No CTPE performed, has contrast allergy. Can be considered if new SOB/tachycardia presents. Reports taking her Xarelto every night without missing any doses. High risk for clots given malignancy and US shows persistent non-occlusive thrombus at left popliteal vein. Unlikely pneumonia with normal WBC, no fever and normal CXR.   - Continue Xarelto    # GERD  # LA Grade A esophagitis  # Non-bleeding gastric ulcers   Last EGD on 3/7/25 given abnormal PET scan of GI tract.   - PTA Protonix 40mg BID. This should continue for 8 weeks from EGD     # FELTON, improving   Baseline Cr of 1.0, decreased to 1.51 on 4/9 from 1.78 on 04/08, improved with discontinuation of Abemaciclib. Initially treated for UTI but symptoms resolved and culture grew mixed urogenital milena.     # Schizoaffective disorder with Bipolar features  # Anxiety/Depression  The patient stated that 1 son is incarcerated which has been giving her a great deal of stress.  Furthermore, her other son recently had a anniversary of death last month. These added stressors have increased her racing thoughts, and health anxiety recently with the progression of her pain.  - Psychiatry consult (4/8)  - Continue Zoloft at 100mg daily   - Continue PTA Seroquel at 500mg at bedtime + PRN hydroxyzine  -  from the Palliative team will sit down bedside for some talk-therapy   - Additional 25-50mg Seroquel BID PRN for anxiety           Diet: Regular Diet Adult    DVT Prophylaxis: DOAC  Crisostomo Catheter: Not present  Fluids: None  Lines: None     Cardiac Monitoring: None  Code Status: Full Code      Clinically Significant Risk Factors                              # Obesity: Estimated body mass index is 33.47 kg/m  as calculated from the following:    Height as of this encounter: 1.829 m  (6').    Weight as of this encounter: 111.9 kg (246 lb 12.8 oz).              Disposition Plan     Medically Ready for Discharge: Anticipated Tomorrow         The patient's care was discussed with the Attending Physician, Dr. Livingston .    Na Griffin MD  Medicine Service, [facility] TEAM 1  [facility]  Securely message with [employer] (more info)  Text page via [facility] Paging/Directory   See signed in provider for up to date coverage information  ______________________________________________________________________    Interval History   Pain is the best controlled today that it has been since prior to admission. Janet reports she ambulated to the nurses station with assistance.     Physical Exam   Vital Signs: Temp: 97.9  F (36.6  C) Temp src: Oral BP: 125/64 Pulse: 83   Resp: 16 SpO2: 97 % O2 Device: None (Room air)    Weight: 246 lbs 12.8 oz    Gen: Awake, comfortable appearing, drinking juice  HEENT: Face symmetric, anicteric  CV: normal rate, regular rhythm  Pulm: no increased work of breathing  Neuro: Alert and interactive, thoughts are more organized today and less racing. Tracking, conversational.   Psych: No flight of ideas, improved from previous    Medical Decision Making       Please see A&P for additional details of medical decision making.      Data         Imaging results reviewed over the past 24 hrs:   No results found for this or any previous visit (from the past 24 hours).

## 2025-04-11 NOTE — PROGRESS NOTES
"   04/11/25 0920   Appointment Info   Signing Clinician's Name / Credentials (PT) Nory Mclaughlin, PT, DPT   Living Environment   People in Home child(pascual), adult;grandchild(pascual)  (lives with adult daughter and 2 grandchildren, ages 6 and 8)   Current Living Arrangements house   Home Accessibility stairs to enter home   Number of Stairs, Main Entrance 7  (4 stairs, then landing, then 3 additional)   Stair Railings, Main Entrance railings on both sides of stairs   Transportation Anticipated family or friend will provide   Living Environment Comments Pt lives in a house with her daughter and 2 grandchildren. 7STE, all needs then met on main level. Tub shower, no grab bars. Pt's son is her PCA (5-6 hours per day), daughter works nights (6pm-6am)   Self-Care   Usual Activity Tolerance moderate   Current Activity Tolerance poor   Regular Exercise No   Equipment Currently Used at Home none;other (see comments)  (has FWW at her son's house)   Fall history within last six months yes   Number of times patient has fallen within last six months 2   General Information   Onset of Illness/Injury or Date of Surgery 04/10/25   Referring Physician Alexandra Mendosa, LEO   Patient/Family Therapy Goals Statement (PT) Get stronger, return home   Pertinent History of Current Problem (include personal factors and/or comorbidities that impact the POC) Per EMR: \"Teresa Sanderson is a 49 year old female admitted on 04/06/25. 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, who was admitted for worsening pain and found to have progressive bone metastasis with bilateral pedicle fracture at L4.\"   Existing Precautions/Restrictions fall;spinal   Weight-Bearing Status - LUE partial weight-bearing (% in comments)  (< 10 lbs)   Weight-Bearing Status - RUE partial weight-bearing (% in comments)  (< 10 lbs)   Weight-Bearing Status - LLE full weight-bearing   Weight-Bearing " Status - RLE full weight-bearing   Cognition   Affect/Mental Status (Cognition) WFL   Pain Assessment   Patient Currently in Pain Yes, see Vital Sign flowsheet  (10/10 low back pain)   Posture    Posture Forward head position;Protracted shoulders   Range of Motion (ROM)   ROM Comment BUE/BLE ROM WFL   Strength (Manual Muscle Testing)   Strength Comments BLE/BLE strength >3/5 per functional screen, bilat knee ext and hip flex 3/5, bilat ankle DF/PF 4/5   Bed Mobility   Comment, (Bed Mobility) supine>sit Eli via logroll, use of bedrail   Transfers   Comment, (Transfers) STS from EOB to FWW with Eli   Gait/Stairs (Locomotion)   Comment, (Gait/Stairs) Pt amb in room with FWW and CGA, decreased gait speed throughout   Sensory Examination   Sensory Perception Comments Numbness/tingling in bilat feet, per pt this is baseline   Coordination   Coordination no deficits were identified   Clinical Impression   Criteria for Skilled Therapeutic Intervention Yes, treatment indicated   PT Diagnosis (PT) Impaired functional mobility   Influenced by the following impairments pain, decreased activity tolerance, weakness, spinal precautions   Functional limitations due to impairments bed mobility, transfers, ambulation, stairs   Clinical Presentation (PT Evaluation Complexity) stable   Clinical Presentation Rationale Clinical judgement   Clinical Decision Making (Complexity) moderate complexity   Planned Therapy Interventions (PT) balance training;bed mobility training;gait training;home exercise program;patient/family education;ROM (range of motion);stair training;strengthening;stretching;transfer training;progressive activity/exercise;risk factor education;home program guidelines   Risk & Benefits of therapy have been explained evaluation/treatment results reviewed;care plan/treatment goals reviewed;risks/benefits reviewed;current/potential barriers reviewed;participants voiced agreement with care plan;participants included;patient  "  Clinical Impression Comments Teresa (\"Janet\") is a 49 year old female who presents with the above impairments. She would benefit from skilled IP PT to safely progress ind with functional mobility and strength.   PT Total Evaluation Time   PT Eval, Moderate Complexity Minutes (97862) 6   Physical Therapy Goals   PT Frequency Daily   PT Predicted Duration/Target Date for Goal Attainment 04/25/25   PT Goals Bed Mobility;Transfers;Gait;Stairs   PT: Bed Mobility Independent;Supine to/from sit;Within precautions   PT: Transfers Modified independent;Bed to/from chair;Sit to/from stand;Assistive device;Within precautions   PT: Gait Modified independent;Standard walker;150 feet;Within precautions   PT: Stairs Modified independent;7 stairs;Rail on both sides;Within precautions   PT Discharge Planning   PT Plan review spinal prec, ind with bed mobility, progress amb, stairs when able, asked MD for OT orders   PT Discharge Recommendation (DC Rec) home with assist;home with home care physical therapy   PT Rationale for DC Rec Pt is currently mobilizing below baseline due to increased pain and new spinal precautions. Pt currently requiring minAx1 for bed mobility and transfers. Pending completion of stairs and progress with therapies, anticipate pt will be safe to d/c home with assist from daughter and her son (who is a PCA for her). Recommending 24/7 assist at this time. If family is not able to provide this level of assist or pt is unable to complete stairs, pt may benefit from TCU stay to progress mobility prior to d/c. Will continue to update recs.   PT Brief overview of current status Ax1 all mobility, TLSO when OOB   PT Total Distance Amb During Session (feet) 65   Physical Therapy Time and Intention   Timed Code Treatment Minutes 40   Total Session Time (sum of timed and untimed services) 46     "

## 2025-04-11 NOTE — PROGRESS NOTES
Prior Authorization Initiated    Medication: [facility] [address] - Social Platform  Insurance Company: [employer] Part D - Phone [phone] Fax [phone]  Filling Pharmacy: [facility] - [address]  Start Date: 4/11/2025  [facility] Key / Reference #: [id number] / [id number]   Comments:  Proactive PA. Please send a script to the discharge pharmacy.    Liz Ponce  [facility] Pharmacy Liaison, [place]  Ph: [phone]  Fax: [phone]  Available on Teams and Vocera

## 2025-04-12 ENCOUNTER — APPOINTMENT (OUTPATIENT)
Dept: OCCUPATIONAL THERAPY | Facility: CLINIC | Age: 50
DRG: 543 | End: 2025-04-12
Payer: MEDICARE

## 2025-04-12 LAB
ANION GAP SERPL CALCULATED.3IONS-SCNC: 8 MMOL/L (ref 7–15)
BUN SERPL-MCNC: 14.7 MG/DL (ref 6–20)
CALCIUM SERPL-MCNC: 9.6 MG/DL (ref 8.8–10.4)
CHLORIDE SERPL-SCNC: 103 MMOL/L (ref 98–107)
CREAT SERPL-MCNC: 1.09 MG/DL (ref 0.51–0.95)
EGFRCR SERPLBLD CKD-EPI 2021: 62 ML/MIN/1.73M2
GLUCOSE SERPL-MCNC: 118 MG/DL (ref 70–99)
HCO3 SERPL-SCNC: 26 MMOL/L (ref 22–29)
POTASSIUM SERPL-SCNC: 4.7 MMOL/L (ref 3.4–5.3)
SODIUM SERPL-SCNC: 137 MMOL/L (ref 135–145)

## 2025-04-12 PROCEDURE — 36415 COLL VENOUS BLD VENIPUNCTURE: CPT

## 2025-04-12 PROCEDURE — 250N000013 HC RX MED GY IP 250 OP 250 PS 637

## 2025-04-12 PROCEDURE — 82435 ASSAY OF BLOOD CHLORIDE: CPT

## 2025-04-12 PROCEDURE — 97535 SELF CARE MNGMENT TRAINING: CPT | Mod: GO

## 2025-04-12 PROCEDURE — 80048 BASIC METABOLIC PNL TOTAL CA: CPT

## 2025-04-12 PROCEDURE — 250N000013 HC RX MED GY IP 250 OP 250 PS 637: Performed by: INTERNAL MEDICINE

## 2025-04-12 PROCEDURE — 97165 OT EVAL LOW COMPLEX 30 MIN: CPT | Mod: GO

## 2025-04-12 PROCEDURE — 99233 SBSQ HOSP IP/OBS HIGH 50: CPT | Mod: GC

## 2025-04-12 PROCEDURE — 250N000013 HC RX MED GY IP 250 OP 250 PS 637: Performed by: STUDENT IN AN ORGANIZED HEALTH CARE EDUCATION/TRAINING PROGRAM

## 2025-04-12 PROCEDURE — 120N000005 HC R&B MS OVERFLOW UMMC

## 2025-04-12 PROCEDURE — 250N000012 HC RX MED GY IP 250 OP 636 PS 637: Performed by: INTERNAL MEDICINE

## 2025-04-12 PROCEDURE — 82565 ASSAY OF CREATININE: CPT

## 2025-04-12 RX ADMIN — DICLOFENAC SODIUM 2 G: 10 GEL TOPICAL at 08:06

## 2025-04-12 RX ADMIN — PANTOPRAZOLE SODIUM 40 MG: 40 TABLET, DELAYED RELEASE ORAL at 08:02

## 2025-04-12 RX ADMIN — METHOCARBAMOL 1000 MG: 500 TABLET ORAL at 20:04

## 2025-04-12 RX ADMIN — GABAPENTIN 300 MG: 300 CAPSULE ORAL at 05:08

## 2025-04-12 RX ADMIN — OXYCODONE HYDROCHLORIDE 10 MG: 10 TABLET ORAL at 13:36

## 2025-04-12 RX ADMIN — OLANZAPINE 2.5 MG: 2.5 TABLET, FILM COATED ORAL at 05:08

## 2025-04-12 RX ADMIN — GABAPENTIN 300 MG: 300 CAPSULE ORAL at 21:48

## 2025-04-12 RX ADMIN — SENNOSIDES AND DOCUSATE SODIUM 2 TABLET: 50; 8.6 TABLET ORAL at 21:49

## 2025-04-12 RX ADMIN — OXYCODONE HYDROCHLORIDE 10 MG: 10 TABLET ORAL at 08:38

## 2025-04-12 RX ADMIN — ACETAMINOPHEN 975 MG: 325 TABLET, FILM COATED ORAL at 13:36

## 2025-04-12 RX ADMIN — QUETIAPINE FUMARATE 500 MG: 400 TABLET ORAL at 21:48

## 2025-04-12 RX ADMIN — BUPRENORPHINE HYDROCHLORIDE 150 MCG: 150 FILM, SOLUBLE BUCCAL at 12:09

## 2025-04-12 RX ADMIN — DICLOFENAC SODIUM 2 G: 10 GEL TOPICAL at 21:48

## 2025-04-12 RX ADMIN — Medication 25 MCG: at 08:02

## 2025-04-12 RX ADMIN — METHOCARBAMOL 1000 MG: 500 TABLET ORAL at 13:36

## 2025-04-12 RX ADMIN — LIDOCAINE 4% 3 PATCH: 40 PATCH TOPICAL at 08:03

## 2025-04-12 RX ADMIN — SENNOSIDES AND DOCUSATE SODIUM 2 TABLET: 50; 8.6 TABLET ORAL at 20:04

## 2025-04-12 RX ADMIN — DICLOFENAC SODIUM 2 G: 10 GEL TOPICAL at 15:45

## 2025-04-12 RX ADMIN — RIVAROXABAN 20 MG: 20 TABLET, FILM COATED ORAL at 15:44

## 2025-04-12 RX ADMIN — ACETAMINOPHEN 975 MG: 325 TABLET, FILM COATED ORAL at 21:48

## 2025-04-12 RX ADMIN — DICLOFENAC SODIUM 2 G: 10 GEL TOPICAL at 12:11

## 2025-04-12 RX ADMIN — OXYCODONE HYDROCHLORIDE 10 MG: 10 TABLET ORAL at 04:30

## 2025-04-12 RX ADMIN — ACETAMINOPHEN 975 MG: 325 TABLET, FILM COATED ORAL at 08:01

## 2025-04-12 RX ADMIN — NICOTINE 1 PATCH: 14 PATCH, EXTENDED RELEASE TRANSDERMAL at 08:03

## 2025-04-12 RX ADMIN — SERTRALINE HYDROCHLORIDE 100 MG: 25 TABLET ORAL at 08:02

## 2025-04-12 RX ADMIN — OXYCODONE HYDROCHLORIDE 10 MG: 10 TABLET ORAL at 17:38

## 2025-04-12 RX ADMIN — GABAPENTIN 300 MG: 300 CAPSULE ORAL at 13:36

## 2025-04-12 RX ADMIN — BUPRENORPHINE HYDROCHLORIDE 150 MCG: 150 FILM, SOLUBLE BUCCAL at 20:04

## 2025-04-12 RX ADMIN — SENNOSIDES AND DOCUSATE SODIUM 2 TABLET: 50; 8.6 TABLET ORAL at 08:02

## 2025-04-12 RX ADMIN — OXYCODONE HYDROCHLORIDE 10 MG: 10 TABLET ORAL at 21:48

## 2025-04-12 RX ADMIN — PANTOPRAZOLE SODIUM 40 MG: 40 TABLET, DELAYED RELEASE ORAL at 15:44

## 2025-04-12 RX ADMIN — METHOCARBAMOL 1000 MG: 500 TABLET ORAL at 08:02

## 2025-04-12 ASSESSMENT — ACTIVITIES OF DAILY LIVING (ADL)
ADLS_ACUITY_SCORE: 53

## 2025-04-12 NOTE — PLAN OF CARE
Shift Events: Pt out with family in visitation area at beginning of the shift, back in room at 2130. MD notified of nicotine patch falling of during pt shower, new nicotine patch reordered and reapplied.     Pain management. Pt reports 8-10/10 lower back pain, increases with movement. Managed with PRN oxycodone x2, scheduled medication regime. Pt reports mild therapeutic relief with pain regimen.     Zyprexa PRN x1 for sleep in am.    Neuro: Alert & Oriented x4. Calm and cooperative, calls appropriately. Denies any headache, dizziness, and lightheadedness.  Cardiac/Tele: Denies chest pain and palpitations. Afebrile.  Respiratory: Sats >95% on room air. Felt a little short of breath moving to bathroom this morning from back pain.  GI/: Continent. Last BM 4/11/2025.  Denies any nausea.  Diet/Appetite: Regular diet, tolerating well.   Skin: No new deficits noted.   LDAs: Left PIV - saline locked.  Activity: Standby assist with walker. Needs to have brace on when out of bed (brace at bedside).  Pain: 8 - 10/10 lower back pain.      P: Continue to monitor and notify team with changes.    Shift: 1900 - 0730.    Plan of Care Reviewed With: patient

## 2025-04-12 NOTE — PLAN OF CARE
Goal Outcome Evaluation:      Plan of Care Reviewed With: patient    Overall Patient Progress: improvingOverall Patient Progress: improving     Pt anticipates discharge home w/ daughter once med ready

## 2025-04-12 NOTE — PROVIDER NOTIFICATION
MD notified of the need for a new nicotine patch because pt's current patch had fallen off during her shower. MD reordered another patch and unit float nurse administrated.
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
## 2025-04-12 NOTE — PLAN OF CARE
Assumed care: 0700 - 1930    /69 (BP Location: Right arm)   Pulse 72   Temp 97.8  F (36.6  C) (Oral)   Resp 18   Ht 1.829 m (6')   Wt 112.2 kg (247 lb 4.8 oz)   SpO2 95%   BMI 33.54 kg/m      Shift summary:  Teresa Sanderson is alert and oriented.  Vital signs stable, on room air, and afebrile.  Patient endorses pain to lower back, intermittent breakthrough pain, medicated with oxycodone x 3 with fair results.  Ambulated to bathroom with brace applied on and able to shower with minimal assist.  Out of bed to chair from noon until about 4PM.  No nausea, vomiting, diarrhea.  Good appetite.  Showered and linens changed.       Recent Labs   Lab 04/12/25  0753 04/09/25  0551   * 148*      Hemoglobin   Date Value Ref Range Status   04/07/2025 10.2 (L) 11.7 - 15.7 g/dL Final   06/23/2021 10.3 (L) 11.7 - 15.7 g/dL Final     Platelet Count   Date Value Ref Range Status   04/07/2025 195 150 - 450 10e3/uL Final   06/23/2021 293 150 - 450 10e9/L Final     WBC   Date Value Ref Range Status   06/23/2021 5.4 4.0 - 11.0 10e9/L Final     WBC Count   Date Value Ref Range Status   04/07/2025 3.7 (L) 4.0 - 11.0 10e3/uL Final     Potassium   Date Value Ref Range Status   04/12/2025 4.7 3.4 - 5.3 mmol/L Final   08/16/2022 3.3 (L) 3.4 - 5.3 mmol/L Final   06/23/2021 3.2 (L) 3.4 - 5.3 mmol/L Final      Magnesium   Date Value Ref Range Status   12/05/2024 1.7 1.7 - 2.3 mg/dL Final      Goal Outcome Evaluation:    Problem: Adult Inpatient Plan of Care  Goal: Plan of Care Review  Description: The Plan of Care Review/Shift note should be completed every shift.  The Outcome Evaluation is a brief statement about your assessment that the patient is improving, declining, or no change.  This information will be displayed automatically on your shiftnote.  Outcome: Progressing  Goal: Patient-Specific Goal (Individualized)  Description: You can add care plan individualizations to a care plan. Examples of Individualization might  "be:  \"Parent requests to be called daily at 9am for status\", \"I have a hard time hearing out of my right ear\", or \"Do not touch me to wake me up as it startlesme\".  Outcome: Progressing  Goal: Absence of Hospital-Acquired Illness or Injury  Outcome: Progressing  Intervention: Identify and Manage Fall Risk  Recent Flowsheet Documentation  Taken 4/12/2025 0809 by Duran Sullivan RN  Safety Promotion/Fall Prevention: safety round/check completed  Intervention: Prevent Skin Injury  Recent Flowsheet Documentation  Taken 4/12/2025 0809 by Duran Sullivan RN  Body Position: position changed independently  Skin Protection:   adhesive use limited   incontinence pads utilized   protective footwear used   tubing/devices free from skin contact  Intervention: Prevent Infection  Recent Flowsheet Documentation  Taken 4/12/2025 0809 by Duran Sullivan RN  Infection Prevention:   environmental surveillance performed   equipment surfaces disinfected   hand hygiene promoted   personal protective equipment utilized   rest/sleep promoted   single patient room provided  Goal: Optimal Comfort and Wellbeing  Outcome: Progressing  Intervention: Monitor Pain and Promote Comfort  Recent Flowsheet Documentation  Taken 4/12/2025 0809 by Duran Sullivan RN  Pain Management Interventions:   medication (see MAR)   distraction   cold applied   care clustered   rest  Goal: Readiness for Transition of Care  Outcome: Progressing     Problem: Pain Acute  Goal: Optimal Pain Control and Function  Outcome: Progressing  Intervention: Optimize Psychosocial Wellbeing  Recent Flowsheet Documentation  Taken 4/12/2025 0809 by Duran Sullivan RN  Diversional Activities: television  Intervention: Develop Pain Management Plan  Recent Flowsheet Documentation  Taken 4/12/2025 0809 by Duran Sullivan RN  Pain Management Interventions:   medication (see MAR)   distraction   cold applied   care clustered   rest  Intervention: Prevent " or Manage Pain  Recent Flowsheet Documentation  Taken 4/12/2025 0809 by Duran Sullivan, RN  Sleep/Rest Enhancement:   awakenings minimized   consistent schedule promoted   noise level reduced   regular sleep/rest pattern promoted   relaxation techniques promoted  Bowel Elimination Promotion: adequate fluid intake promoted  Medication Review/Management:   medications reviewed   high-risk medications identified
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
## 2025-04-13 ENCOUNTER — APPOINTMENT (OUTPATIENT)
Dept: PHYSICAL THERAPY | Facility: CLINIC | Age: 50
DRG: 543 | End: 2025-04-13
Payer: MEDICARE

## 2025-04-13 PROCEDURE — 250N000012 HC RX MED GY IP 250 OP 636 PS 637: Performed by: INTERNAL MEDICINE

## 2025-04-13 PROCEDURE — 97116 GAIT TRAINING THERAPY: CPT | Mod: GP

## 2025-04-13 PROCEDURE — 250N000013 HC RX MED GY IP 250 OP 250 PS 637: Performed by: STUDENT IN AN ORGANIZED HEALTH CARE EDUCATION/TRAINING PROGRAM

## 2025-04-13 PROCEDURE — 99232 SBSQ HOSP IP/OBS MODERATE 35: CPT | Performed by: FAMILY MEDICINE

## 2025-04-13 PROCEDURE — 250N000013 HC RX MED GY IP 250 OP 250 PS 637

## 2025-04-13 PROCEDURE — 99233 SBSQ HOSP IP/OBS HIGH 50: CPT | Mod: GC

## 2025-04-13 PROCEDURE — 97530 THERAPEUTIC ACTIVITIES: CPT | Mod: GP

## 2025-04-13 PROCEDURE — 120N000005 HC R&B MS OVERFLOW UMMC

## 2025-04-13 PROCEDURE — 250N000013 HC RX MED GY IP 250 OP 250 PS 637: Performed by: INTERNAL MEDICINE

## 2025-04-13 RX ADMIN — METHOCARBAMOL 1000 MG: 500 TABLET ORAL at 19:37

## 2025-04-13 RX ADMIN — GABAPENTIN 300 MG: 300 CAPSULE ORAL at 14:25

## 2025-04-13 RX ADMIN — DICLOFENAC SODIUM 2 G: 10 GEL TOPICAL at 17:10

## 2025-04-13 RX ADMIN — ACETAMINOPHEN 975 MG: 325 TABLET, FILM COATED ORAL at 21:22

## 2025-04-13 RX ADMIN — SERTRALINE HYDROCHLORIDE 100 MG: 25 TABLET ORAL at 09:59

## 2025-04-13 RX ADMIN — BUPRENORPHINE HYDROCHLORIDE 150 MCG: 150 FILM, SOLUBLE BUCCAL at 19:38

## 2025-04-13 RX ADMIN — METHOCARBAMOL 1000 MG: 500 TABLET ORAL at 09:59

## 2025-04-13 RX ADMIN — OXYCODONE HYDROCHLORIDE 10 MG: 10 TABLET ORAL at 14:55

## 2025-04-13 RX ADMIN — DICLOFENAC SODIUM 2 G: 10 GEL TOPICAL at 19:38

## 2025-04-13 RX ADMIN — BUPRENORPHINE HYDROCHLORIDE 150 MCG: 150 FILM, SOLUBLE BUCCAL at 10:00

## 2025-04-13 RX ADMIN — OXYCODONE HYDROCHLORIDE 10 MG: 10 TABLET ORAL at 02:10

## 2025-04-13 RX ADMIN — Medication 25 MCG: at 10:00

## 2025-04-13 RX ADMIN — PANTOPRAZOLE SODIUM 40 MG: 40 TABLET, DELAYED RELEASE ORAL at 17:10

## 2025-04-13 RX ADMIN — SENNOSIDES AND DOCUSATE SODIUM 2 TABLET: 50; 8.6 TABLET ORAL at 09:59

## 2025-04-13 RX ADMIN — QUETIAPINE FUMARATE 500 MG: 400 TABLET ORAL at 21:22

## 2025-04-13 RX ADMIN — OXYCODONE HYDROCHLORIDE 10 MG: 10 TABLET ORAL at 06:12

## 2025-04-13 RX ADMIN — METHOCARBAMOL 1000 MG: 500 TABLET ORAL at 14:25

## 2025-04-13 RX ADMIN — GABAPENTIN 300 MG: 300 CAPSULE ORAL at 06:12

## 2025-04-13 RX ADMIN — PANTOPRAZOLE SODIUM 40 MG: 40 TABLET, DELAYED RELEASE ORAL at 09:59

## 2025-04-13 RX ADMIN — NICOTINE 1 PATCH: 14 PATCH, EXTENDED RELEASE TRANSDERMAL at 10:01

## 2025-04-13 RX ADMIN — RIVAROXABAN 20 MG: 20 TABLET, FILM COATED ORAL at 17:10

## 2025-04-13 RX ADMIN — POLYETHYLENE GLYCOL 3350 17 G: 17 POWDER, FOR SOLUTION ORAL at 09:59

## 2025-04-13 RX ADMIN — GABAPENTIN 300 MG: 300 CAPSULE ORAL at 21:22

## 2025-04-13 RX ADMIN — ACETAMINOPHEN 975 MG: 325 TABLET, FILM COATED ORAL at 14:25

## 2025-04-13 RX ADMIN — ACETAMINOPHEN 975 MG: 325 TABLET, FILM COATED ORAL at 06:12

## 2025-04-13 RX ADMIN — DICLOFENAC SODIUM 2 G: 10 GEL TOPICAL at 10:01

## 2025-04-13 RX ADMIN — LIDOCAINE 4% 3 PATCH: 40 PATCH TOPICAL at 10:00

## 2025-04-13 RX ADMIN — SENNOSIDES AND DOCUSATE SODIUM 2 TABLET: 50; 8.6 TABLET ORAL at 19:37

## 2025-04-13 ASSESSMENT — ACTIVITIES OF DAILY LIVING (ADL)
ADLS_ACUITY_SCORE: 52
ADLS_ACUITY_SCORE: 53
ADLS_ACUITY_SCORE: 53
ADLS_ACUITY_SCORE: 52
ADLS_ACUITY_SCORE: 52
ADLS_ACUITY_SCORE: 53
ADLS_ACUITY_SCORE: 53
ADLS_ACUITY_SCORE: 52
ADLS_ACUITY_SCORE: 52
ADLS_ACUITY_SCORE: 53
ADLS_ACUITY_SCORE: 52
ADLS_ACUITY_SCORE: 53
ADLS_ACUITY_SCORE: 52

## 2025-04-13 NOTE — PLAN OF CARE
"Assumed care: 0700 - 1930    /77   Pulse 74   Temp 97.8  F (36.6  C) (Oral)   Resp 18   Ht 1.829 m (6')   Wt 113.4 kg (250 lb)   SpO2 98%   BMI 33.91 kg/m      Shift summary:  Teresa Sanderson is alert and oriented.  SBA with walker and back brace on at all times when OOB.  Vital signs stable, on room air, and afebrile.  Patient continues with lower back pain, medicated with oxycodone 10 mg x 1.  No nausea, vomiting, diarrhea.      Recent Labs   Lab 04/12/25  0753 04/09/25  0551   * 148*      Hemoglobin   Date Value Ref Range Status   04/07/2025 10.2 (L) 11.7 - 15.7 g/dL Final   06/23/2021 10.3 (L) 11.7 - 15.7 g/dL Final     Platelet Count   Date Value Ref Range Status   04/07/2025 195 150 - 450 10e3/uL Final   06/23/2021 293 150 - 450 10e9/L Final     WBC   Date Value Ref Range Status   06/23/2021 5.4 4.0 - 11.0 10e9/L Final     WBC Count   Date Value Ref Range Status   04/07/2025 3.7 (L) 4.0 - 11.0 10e3/uL Final     Potassium   Date Value Ref Range Status   04/12/2025 4.7 3.4 - 5.3 mmol/L Final   08/16/2022 3.3 (L) 3.4 - 5.3 mmol/L Final   06/23/2021 3.2 (L) 3.4 - 5.3 mmol/L Final      Magnesium   Date Value Ref Range Status   12/05/2024 1.7 1.7 - 2.3 mg/dL Final      No results found for: \"PHOS\"  Goal Outcome Evaluation:    Problem: Adult Inpatient Plan of Care  Goal: Plan of Care Review  Description: The Plan of Care Review/Shift note should be completed every shift.  The Outcome Evaluation is a brief statement about your assessment that the patient is improving, declining, or no change.  This information will be displayed automatically on your shiftnote.  Outcome: Progressing  Goal: Patient-Specific Goal (Individualized)  Description: You can add care plan individualizations to a care plan. Examples of Individualization might be:  \"Parent requests to be called daily at 9am for status\", \"I have a hard time hearing out of my right ear\", or \"Do not touch me to wake me up as it " "startlesme\".  Outcome: Progressing  Goal: Absence of Hospital-Acquired Illness or Injury  Outcome: Progressing  Intervention: Identify and Manage Fall Risk  Recent Flowsheet Documentation  Taken 4/13/2025 1000 by Duran Sullivan RN  Safety Promotion/Fall Prevention: safety round/check completed  Intervention: Prevent Skin Injury  Recent Flowsheet Documentation  Taken 4/13/2025 0956 by Duran Sullivan RN  Body Position: position changed independently  Intervention: Prevent Infection  Recent Flowsheet Documentation  Taken 4/13/2025 1000 by Duran Sullivan RN  Infection Prevention:   environmental surveillance performed   equipment surfaces disinfected   hand hygiene promoted   personal protective equipment utilized   rest/sleep promoted   single patient room provided  Goal: Optimal Comfort and Wellbeing  Outcome: Progressing  Intervention: Monitor Pain and Promote Comfort  Recent Flowsheet Documentation  Taken 4/13/2025 0956 by Duran Sullivan RN  Pain Management Interventions:   medication (see MAR)   distraction   cold applied   care clustered   rest  Goal: Readiness for Transition of Care  Outcome: Progressing     Problem: Pain Acute  Goal: Optimal Pain Control and Function  Outcome: Progressing  Intervention: Develop Pain Management Plan  Recent Flowsheet Documentation  Taken 4/13/2025 0956 by Duran Sullivan RN  Pain Management Interventions:   medication (see MAR)   distraction   cold applied   care clustered   rest  Intervention: Prevent or Manage Pain  Recent Flowsheet Documentation  Taken 4/13/2025 1000 by Duran Sullivan RN  Sleep/Rest Enhancement:   consistent schedule promoted   noise level reduced   regular sleep/rest pattern promoted   relaxation techniques promoted  Bowel Elimination Promotion: adequate fluid intake promoted  Medication Review/Management:   medications reviewed   high-risk medications identified       "
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
## 2025-04-13 NOTE — PROGRESS NOTES
PALLIATIVE CARE PROGRESS NOTE  [facility]     Patient Name: Teresa Sanderson  Date of Admission: 4/6/2025        Recommendations & Counseling     What's New Today:  Overall, patient is able to tell me that her pain is meaningfully better since starting Belbuca.  I reviewed her recent breakthrough pain med needs (oxycodone 10 mg up to every 4 hours as needed).  She had been using 5 doses per day, but by the time I saw her this afternoon she had only required 2 doses.  For those reasons, would not increase Belbuca today.  Reassess tomorrow morning.  When she was brushing her teeth today she noticed some redness on her tongue.  Although she indicated to primary team that this was painful, she told me that it is not.  This would not be a common side effect with Belbuca, and I do not believe it is related to her Belbuca use.  GOALS OF CARE:   Life-prolonging without limits   Teresa is looking forward to being able to return home and helps to be able to care for herself and have the pain controlled and then plans to start everolimus on discharge and will continue on fulvestrant. New baseline PET/CT to be done after discharge and repeat 2 months after starting treatment with everolimus and fulvestrant .      ADVANCE CARE PLANNING:  No health care directive on file. Per system policy, Surrogate Decision-makers for Patients With Diminished Decision-making Capacity offers guidance on possible decision-makers. Lives with daughter who is at bedside along with oldest son has been identified as a surrogate decision maker.   There is no POLST form on file,  recommend continued medical treatment and FULL CODE   Code status: Full Code    MEDICAL MANAGEMENT: all medication changes today ordered for you      Acute on chronic back pain: Pain exacerbation is most likely due to bilateral pedicle fractures at L4 which were not present on PET/CT from 2/3/2025. The majority of her lesions  within the lumbar spine are chronic in nature and have not significantly progressed since recent imaging.    Today patient is alert and pain is better controlled than it was on admission.  She states that the pain is still significant but improving.    Patient has had several years of pain, some related to metastatic lesions in spine and other pain with unclear etiology.  Patient had been tried on a Butrans patch without control of pain.  She had been on oxycodone without good control of her pain and difficult to get a clear history of how much oxycodone she would take on a regular basis.  I do believe that a longer acting opioid would benefit in pain control and would like to try buprenorphine as this has less side effects than other opioids including less respiratory suppression, less constipation.  Awaiting pharmacy/insurance approval for Belbuca.      Belbuca as indicated below for pain management, NOT for opioid use disorder    Continue Belbuca 150mcg BID - See comments above  Continue oxycodone 10mg q4hrs PRN   Continue gabapentin to 300mg TID (started on admission 4/6) - pt was not taking gabapentin at home - GFR fluctuating between 34-57.  Will not increase gabapentin dosage at this time but if renal function remains stable as an outpatient, can consider increasing at that time.  Continue Tylenol 975mg TID scheduled and 325mg TID PRN  Continue Robaxin 1000mg TID scheduled   Continue lidocaine patch  Pt on anticoagulation so will not add NSAIDs at this time.        Anxiety/schizoaffective disorder, bipolar type on antipsychotic medications  Appreciate psychiatry consult - medications as per their recs:  increase zoloft 100mg every day  Continue seroquel 500mg at bedtime  Continue PRN zyprexa and hydroxyzine   Palliative care  will continue to support    PSYCHOSOCIAL/SPIRITUAL:  Family daughter at bedside today   support greatly appreciated    Palliative Care will continue to follow. Thank you  for the consult and allowing us to aid in the care of Teresa Sanderson.    Ray Cooper MD  Palliative Medicine Consult Team  Page via UEIS  TT: 39 minutes, with > 50% spent in C/C/E patient/family/care teams re: GOC, POC, Sx management.        Assessment:           Teresa Sanderson is a 49 year old female with a past medical history of metastatic breast cancer dx 12/2020; widespread bone mets. S/p RT to lumbar spine and RFA/kyphoplasty L4 5/2021. She has been on several different lines of therapy (with some treatment interruptions).  -PET/CT in 04/2023 showed PD in two small bone metastasis in L pelvis, S/p 5 fractions RT to these lesions.   -Some lapses/breaks in treatment due to complicated social situation.  -Evidence of PD on 12/2023 PET scan.  Changed to Verzenio 01/2024 and Fulvestrant. Again s/p RT to low back and L hip 07/2024. She presented to the ED on 4/6 with 4-5 days of increasing low back pain, unable to control pain with pulm medications.    Since admission, CT scan showed new bilateral pedicle L4 fracture which is most likely causing increased low back pain.  No surgical intervention or radiation indicated at this time.     Today, the patient was seen for:  Progressive, metastatic breast cancer with known bony mets  Low back pain  Cancer related pain  Anxiety  Insomnia  Support  Palliative care encounter           Interval History:     Multidisciplinary collaboration:  All notes reviewed including nursing, medicine, pharmacy, health psychology, physical therapy, spiritual health  Spoke with primary medicine team  Reviewed all events over the past 24 hours    I reviewed this patient's medication profile and medications during this hospitalization.  Notable medications:  Acetaminophen 975mg TID  Voltaren gel QID  Gabapentin 300mg TID started 4/8  Lidocaine patch  Robaxin 1000mg TID  Belbuca 150mcg BID - started 4/10 in evening  Oxycodone 10mg q3hrs PRN -4-5 doses daily; only 2 in past 24  hours  Hydromorphone has been discontinued    Zyorexa 2.5mg every day PRN and 5-10mg at bedtime PRN   Atarax 25mg PO  QID PRN   Seroquel 500mg at bedtime  Zoloft 100mg every day (increased from 50 on admission)  Senna 2 tabs BID       Patient/family narrative  Overall pain still present but better than on admission.       Review of Systems:     Besides above, ROS was reviewed and is unremarkable              Physical Exam:   Blood pressure 115/66, pulse 74, temperature 97.8  F (36.6  C), temperature source Oral, resp. rate 18, height 1.829 m (6'), weight 113.4 kg (250 lb), SpO2 92%, not currently breastfeeding.   Gen: Alert, lying in bed, appears comfortable, no increased work of breathing, in no acute distress, engaged, appropriate, sensorium intact.  Engages in extended conversation.  ENT: Moist oral mucosa.  Some mild discoloration patches on her tongue that do not resemble thrush or other specific pathology.  No open sores.  MSK: Pain with palpation to paraspinous lumbar region.  Cognition: No significant sedation           Current Problem List:   Active Problems:    Uncontrolled pain    Carcinoma of breast metastatic to bone, unspecified laterality (H)    FELTON (acute kidney injury)      Allergies   Allergen Reactions    Contrast Dye      Pt developed nausea after isovue 370 injection on 6/9/21             Data Reviewed:     Reviewed recent labs and pertinent imaging  ROUTINE ICU LABS (Last four results)  CMP  Recent Labs   Lab 04/12/25  0753 04/09/25  0551 04/08/25  1403 04/07/25  0452 04/06/25  2333    137 140 139 139   POTASSIUM 4.7 4.1 4.0 3.8 4.1   CHLORIDE 103 102 102 104 102   CO2 26 25 25 23 25   ANIONGAP 8 10 13 12 12   * 148* 126* 133* 118*   BUN 14.7 19.4 17.4 15.5 15.9   CR 1.09* 1.51* 1.78* 1.17* 1.30*   GFRESTIMATED 62 42* 34* 57* 50*   NANDO 9.6 9.4 10.2 9.5 10.1   PROTTOTAL  --   --   --  7.3 7.6   ALBUMIN  --   --   --  3.8 3.9   BILITOTAL  --   --   --  0.2 0.2   ALKPHOS  --   --   --   86 93   AST  --   --   --  16 18   ALT  --   --   --  9 10     CBC  Recent Labs   Lab 04/07/25  0452 04/06/25  2333   WBC 3.7* 3.8*   RBC 3.08* 3.23*   HGB 10.2* 10.9*   HCT 31.5* 33.7*   * 104*   MCH 33.1* 33.7*   MCHC 32.4 32.3   RDW 13.0 12.9    200     INRNo lab results found in last 7 days.  Arterial Blood GasNo lab results found in last 7 days.    X-ray thoracic lumbar standing 4/9:   Impression:   1. Bilateral L4 pedicle fractures. Interval 7 mm anterolisthesis of L4  on L5 concerning for instability.  2. Multilevel degenerative changes of the lumbar spine, pronounced at  L4-5 and L5-S1.

## 2025-04-13 NOTE — PLAN OF CARE
"Hours of care: 5632-5126    Neuro: A&Ox4.   Vitals: Afebrile, VSS.   Respiratory: RA, lung sounds clear, denies SOB  GI/: Voiding spontaneously. No BM this shift.  Diet/appetite: Tolerating regular diet . Denies nausea.   Activity: Up with SBA  Pain: Controlled with current regimen  Skin: No new deficits noted.  Lines: PIV, SL  Drains: none  Replacements: none given    Plan: Continue with current POC. Report changes to primary team.      Problem: Adult Inpatient Plan of Care  Goal: Plan of Care Review  Description: The Plan of Care Review/Shift note should be completed every shift.  The Outcome Evaluation is a brief statement about your assessment that the patient is improving, declining, or no change.  This information will be displayed automatically on your shiftnote.  Outcome: Progressing  Flowsheets (Taken 4/13/2025 0625)  Plan of Care Reviewed With: patient  Overall Patient Progress: improving  Goal: Patient-Specific Goal (Individualized)  Description: You can add care plan individualizations to a care plan. Examples of Individualization might be:  \"Parent requests to be called daily at 9am for status\", \"I have a hard time hearing out of my right ear\", or \"Do not touch me to wake me up as it startlesme\".  Outcome: Progressing  Goal: Absence of Hospital-Acquired Illness or Injury  Outcome: Progressing  Intervention: Identify and Manage Fall Risk  Recent Flowsheet Documentation  Taken 4/12/2025 2000 by Anne Morfin, RN  Safety Promotion/Fall Prevention: safety round/check completed  Intervention: Prevent Skin Injury  Recent Flowsheet Documentation  Taken 4/12/2025 2000 by Anne Morfin, RN  Body Position: position changed independently  Intervention: Prevent Infection  Recent Flowsheet Documentation  Taken 4/12/2025 2000 by Anne Morfin, RN  Infection Prevention:   environmental surveillance performed   equipment surfaces disinfected   hand hygiene promoted   personal protective equipment " utilized   rest/sleep promoted   single patient room provided  Goal: Optimal Comfort and Wellbeing  Outcome: Progressing  Goal: Readiness for Transition of Care  Outcome: Progressing     Problem: Pain Acute  Goal: Optimal Pain Control and Function  Outcome: Progressing  Intervention: Optimize Psychosocial Wellbeing  Recent Flowsheet Documentation  Taken 4/12/2025 2000 by Anne Morfin, RN  Diversional Activities: television  Intervention: Prevent or Manage Pain  Recent Flowsheet Documentation  Taken 4/12/2025 2000 by Anne Morfin, RN  Sleep/Rest Enhancement:   consistent schedule promoted   noise level reduced   regular sleep/rest pattern promoted   relaxation techniques promoted  Bowel Elimination Promotion: adequate fluid intake promoted  Medication Review/Management:   medications reviewed   high-risk medications identified   Goal Outcome Evaluation:      Plan of Care Reviewed With: patient    Overall Patient Progress: improvingOverall Patient Progress: improving

## 2025-04-14 ENCOUNTER — DOCUMENTATION ONLY (OUTPATIENT)
Dept: ONCOLOGY | Facility: CLINIC | Age: 50
End: 2025-04-14
Payer: MEDICARE

## 2025-04-14 VITALS
BODY MASS INDEX: 34.25 KG/M2 | SYSTOLIC BLOOD PRESSURE: 122 MMHG | DIASTOLIC BLOOD PRESSURE: 87 MMHG | RESPIRATION RATE: 18 BRPM | HEIGHT: 72 IN | TEMPERATURE: 97.7 F | OXYGEN SATURATION: 96 % | HEART RATE: 62 BPM | WEIGHT: 252.9 LBS

## 2025-04-14 PROCEDURE — 99238 HOSP IP/OBS DSCHRG MGMT 30/<: CPT

## 2025-04-14 PROCEDURE — 250N000013 HC RX MED GY IP 250 OP 250 PS 637: Performed by: STUDENT IN AN ORGANIZED HEALTH CARE EDUCATION/TRAINING PROGRAM

## 2025-04-14 PROCEDURE — 250N000013 HC RX MED GY IP 250 OP 250 PS 637

## 2025-04-14 PROCEDURE — 99232 SBSQ HOSP IP/OBS MODERATE 35: CPT | Performed by: FAMILY MEDICINE

## 2025-04-14 PROCEDURE — 250N000013 HC RX MED GY IP 250 OP 250 PS 637: Performed by: INTERNAL MEDICINE

## 2025-04-14 PROCEDURE — 250N000012 HC RX MED GY IP 250 OP 636 PS 637: Performed by: INTERNAL MEDICINE

## 2025-04-14 PROCEDURE — 97530 THERAPEUTIC ACTIVITIES: CPT | Mod: GP

## 2025-04-14 RX ORDER — METHOCARBAMOL 1000 MG/1
1000 TABLET, FILM COATED ORAL 3 TIMES DAILY
Qty: 90 TABLET | Refills: 0 | Status: SHIPPED | OUTPATIENT
Start: 2025-04-14 | End: 2025-04-14

## 2025-04-14 RX ORDER — GABAPENTIN 300 MG/1
300 CAPSULE ORAL 3 TIMES DAILY
Qty: 90 CAPSULE | Refills: 0 | Status: SHIPPED | OUTPATIENT
Start: 2025-04-14

## 2025-04-14 RX ORDER — HYDROXYZINE HYDROCHLORIDE 25 MG/1
25 TABLET, FILM COATED ORAL 4 TIMES DAILY PRN
Qty: 120 TABLET | Refills: 1 | Status: SHIPPED | OUTPATIENT
Start: 2025-04-14

## 2025-04-14 RX ORDER — SERTRALINE HYDROCHLORIDE 100 MG/1
100 TABLET, FILM COATED ORAL DAILY
Qty: 30 TABLET | Refills: 0 | Status: SHIPPED | OUTPATIENT
Start: 2025-04-14

## 2025-04-14 RX ORDER — METHOCARBAMOL 500 MG/1
1000 TABLET, FILM COATED ORAL 3 TIMES DAILY
Qty: 180 TABLET | Refills: 0 | Status: SHIPPED | OUTPATIENT
Start: 2025-04-14 | End: 2025-04-14

## 2025-04-14 RX ORDER — CYCLOBENZAPRINE HCL 5 MG
5 TABLET ORAL 3 TIMES DAILY
Qty: 45 TABLET | Refills: 0 | Status: SHIPPED | OUTPATIENT
Start: 2025-04-14

## 2025-04-14 RX ORDER — OXYCODONE HYDROCHLORIDE 10 MG/1
10 TABLET ORAL EVERY 4 HOURS PRN
Qty: 84 TABLET | Refills: 0 | Status: SHIPPED | OUTPATIENT
Start: 2025-04-14

## 2025-04-14 RX ORDER — QUETIAPINE FUMARATE 100 MG/1
500 TABLET, FILM COATED ORAL AT BEDTIME
Qty: 150 TABLET | Refills: 0 | Status: SHIPPED | OUTPATIENT
Start: 2025-04-14 | End: 2025-05-14

## 2025-04-14 RX ORDER — CYCLOBENZAPRINE HCL 5 MG
5 TABLET ORAL 3 TIMES DAILY
Status: DISCONTINUED | OUTPATIENT
Start: 2025-04-14 | End: 2025-04-14 | Stop reason: HOSPADM

## 2025-04-14 RX ADMIN — OXYCODONE HYDROCHLORIDE 10 MG: 10 TABLET ORAL at 08:30

## 2025-04-14 RX ADMIN — GABAPENTIN 300 MG: 300 CAPSULE ORAL at 13:51

## 2025-04-14 RX ADMIN — SERTRALINE HYDROCHLORIDE 100 MG: 25 TABLET ORAL at 08:28

## 2025-04-14 RX ADMIN — ACETAMINOPHEN 975 MG: 325 TABLET, FILM COATED ORAL at 06:15

## 2025-04-14 RX ADMIN — ACETAMINOPHEN 975 MG: 325 TABLET, FILM COATED ORAL at 13:50

## 2025-04-14 RX ADMIN — SENNOSIDES AND DOCUSATE SODIUM 2 TABLET: 50; 8.6 TABLET ORAL at 08:28

## 2025-04-14 RX ADMIN — LIDOCAINE 4% 3 PATCH: 40 PATCH TOPICAL at 10:03

## 2025-04-14 RX ADMIN — NICOTINE 1 PATCH: 14 PATCH, EXTENDED RELEASE TRANSDERMAL at 08:32

## 2025-04-14 RX ADMIN — METHOCARBAMOL 1000 MG: 500 TABLET ORAL at 13:51

## 2025-04-14 RX ADMIN — BUPRENORPHINE HYDROCHLORIDE 150 MCG: 150 FILM, SOLUBLE BUCCAL at 09:38

## 2025-04-14 RX ADMIN — Medication 5 MG: at 00:09

## 2025-04-14 RX ADMIN — GABAPENTIN 300 MG: 300 CAPSULE ORAL at 06:15

## 2025-04-14 RX ADMIN — Medication 25 MCG: at 08:28

## 2025-04-14 RX ADMIN — PANTOPRAZOLE SODIUM 40 MG: 40 TABLET, DELAYED RELEASE ORAL at 08:29

## 2025-04-14 RX ADMIN — METHOCARBAMOL 1000 MG: 500 TABLET ORAL at 08:28

## 2025-04-14 RX ADMIN — POLYETHYLENE GLYCOL 3350 17 G: 17 POWDER, FOR SOLUTION ORAL at 10:08

## 2025-04-14 ASSESSMENT — ACTIVITIES OF DAILY LIVING (ADL)
ADLS_ACUITY_SCORE: 52
ADLS_ACUITY_SCORE: 53
ADLS_ACUITY_SCORE: 52
ADLS_ACUITY_SCORE: 52
ADLS_ACUITY_SCORE: 53
ADLS_ACUITY_SCORE: 52
ADLS_ACUITY_SCORE: 53
ADLS_ACUITY_SCORE: 53
ADLS_ACUITY_SCORE: 52
ADLS_ACUITY_SCORE: 53
ADLS_ACUITY_SCORE: 52
ADLS_ACUITY_SCORE: 53
ADLS_ACUITY_SCORE: 53
ADLS_ACUITY_SCORE: 52
ADLS_ACUITY_SCORE: 53
ADLS_ACUITY_SCORE: 52

## 2025-04-14 NOTE — CONSULTS
Discharge Pharmacy Test Claim    Everolimus required prior authorization through patient's [employer] Medicare Part D plan. Pharmacy liaison requested and received PA approval. Patient will have $0 expected copay.    Test Claim Copay   everolimus 10mg tabs 0.00     Liz Ponce  [facility] Pharm[facility] Liaison, [place]  Ph: [phone]  Fax: [phone]  Available on Teams and [employer]  Disclaimer: Pharmacy test claims are estimates and may not reflect final costs. Suggested alternatives aim to be cost-effective and may not be therapeutically equivalent. This consult is informational and does not constitute medical advice. Clinical decisions should be made by qualified healthcare providers.

## 2025-04-14 NOTE — PLAN OF CARE
Goal Outcome Evaluation:      Plan of Care Reviewed With: patient          ./70 (BP Location: Right arm)   Pulse 71   Temp 97.9  F (36.6  C) (Oral)   Resp 18   Ht 1.829 m (6')   Wt 113.4 kg (250 lb)   SpO2 95%   BMI 33.91 kg/m          Patient alert and oriented. Afebrile, OVSS. Not in distress. Generalized lower back pain 6-7/10 noted, due pain meds given. Denies nausea. On standby assist, independent, uses walker and back brace when ambulating. Bed alarm on for safety. Still no BM this shift. Voids adequately. PRN Melatonin given x 1. Monitored accordingly, for further care and management.       Problem: Adult Inpatient Plan of Care  Goal: Plan of Care Review  Description: The Plan of Care Review/Shift note should be completed every shift.  The Outcome Evaluation is a brief statement about your assessment that the patient is improving, declining, or no change.  This information will be displayed automatically on your shiftnote.  Outcome: Progressing  Flowsheets (Taken 4/14/2025 0252)  Plan of Care Reviewed With: patient     Problem: Pain Acute  Goal: Optimal Pain Control and Function  Outcome: Progressing  Intervention: Optimize Psychosocial Wellbeing  Recent Flowsheet Documentation  Taken 4/13/2025 2000 by Li Bone, RN  Diversional Activities: television  Intervention: Develop Pain Management Plan  Recent Flowsheet Documentation  Taken 4/14/2025 0000 by Li Bone, RN  Pain Management Interventions:   repositioned   rest  Taken 4/13/2025 1938 by Li Bone, RN  Pain Management Interventions:   medication (see MAR)   distraction   care clustered   rest  Intervention: Prevent or Manage Pain  Recent Flowsheet Documentation  Taken 4/14/2025 0000 by Li Bone, RN  Medication Review/Management:   medications reviewed   high-risk medications identified  Taken 4/13/2025 2000 by Li Bone, RN  Sensory Stimulation Regulation:   auditory stimulation minimized   care clustered   lighting  decreased   quiet environment promoted   television on  Sleep/Rest Enhancement:   awakenings minimized   comfort measures   consistent schedule promoted   regular sleep/rest pattern promoted   relaxation techniques promoted   room darkened  Bowel Elimination Promotion: adequate fluid intake promoted  Medication Review/Management:   medications reviewed   high-risk medications identified

## 2025-04-14 NOTE — PLAN OF CARE
/87 (BP Location: Right arm)   Pulse 62   Temp 97.7  F (36.5  C) (Oral)   Resp 18   Ht 1.829 m (6')   Wt 114.7 kg (252 lb 14.4 oz)   SpO2 96%   BMI 34.30 kg/m    A & O x4.  Pt reports pain is better controlled, despite continuing to rate back pain 6-7/10.  Reports pain is at a 6-7/10 when she is up moving.  Pt received Oxycodone 10mg prn x1 prior to showering this am, otherwise, pt reports adequate control with scheduled.  BMx1-Miralax, Senna 2 tabs given.  Voiding well.  Eating/drinking fine.  TLSO brace on when OOB.  Pt discharged to home with family present.  Writer reviewed discharge instructions, upcoming appointments and medications with patient and family.  Pt stated understanding of information.      Problem: Adult Inpatient Plan of Care  Goal: Plan of Care Review  Description: The Plan of Care Review/Shift note should be completed every shift.  The Outcome Evaluation is a brief statement about your assessment that the patient is improving, declining, or no change.  This information will be displayed automatically on your shiftnote.  Outcome: Adequate for Care Transition     Problem: Adult Inpatient Plan of Care  Goal: Optimal Comfort and Wellbeing  Outcome: Adequate for Care Transition     Problem: Pain Acute  Goal: Optimal Pain Control and Function  Outcome: Adequate for Care Transition   Goal Outcome Evaluation:

## 2025-04-14 NOTE — PROGRESS NOTES
Prior Authorization Approval    Medication: BELBUCA 300 MCG BU FILM  PA Initiated: 4/11/2025  PA Type: Clinical    Insurance: [employer] Part D - Phone [phone] Fax [phone]  On license of [facility] / Reference #: [id number] / [id number]   Authorization Effective Dates: 4/11/2025 - 12/31/2025    Expected CoPay: $ 0.00  CoPay Card Eligible: No    Filling Pharmacy: [facility] - [address]  Comments:  Proactive PA. Please send a script to the discharge pharmacy.    Liz Ponce  [facility] Pharmacy Liaison, [place]  Ph: [phone]  Fax: [phone]  Available on Teams and [employer]

## 2025-04-14 NOTE — PROGRESS NOTES
Solid Tumor Oncology Consult Service  Progress Note   Date of Service: 04/14/2025  Patient: Teresa Sanderson  MRN: [id number]  Admission Date: 4/6/2025  Hospital Day # 7  Cancer Diagnosis: ER-positive, HER2-negative breast cancer metastasized to bones   Primary Outpatient Oncologist: Dr. Jahaira Plunkett  Current Treatment Plan: Most recently on abemaciclib and fulvestrant. With progression, plan to transition to everolimus and fulvestrant.     Summary & Recommendations:   - Pharmacy liaison consulted for everolimus approval. Approved with $0 co-pay. Will plan to prescribe and initiate at discharge.   - No new oncologic recommendations.     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 She was admitted for worsening pain and was found to have progression of bony metastases with bilateral pedicle fracture at L4.     # ER-positive, HER2-negative breast cancer metastasized to bones   # Disease progression  # Bilateral L4 pedicle fractures   Follows with Dr. Plunkett. See outpatient notes for comprehensive oncologic history. Initially diagnosed in 2020. Initial biopsy showed IDC with surrounding DCIS, grade 3, ER+ 90%, and NM+ 75%. HER2 was equivocal in approximately 35% of tumor cells by FISH and was negative by IHC. She has been through a few lines of therapy including Ibrance, zoladex, and anastrozole, palbociclib, and most recently, abemaciclib and fulvestrant since 1/18/24. She has received radiation to several locations including L4, L ilium, L acetabulum and SI. She presented on 4/6 worsening pain and was found to have progression of bony metastases with bilateral pedicle fracture at L4.   - Per Dr. Plunkett, recommending next line everolimus + fulvestrant. Plan to start everolimus at discharge, approved with  "$0 co-pay. Last fulvestrant administered on 3/27; therefore next is not due until 4/24.   - Will plan to obtain new baseline PET/CT on discharge (scheduled for 4/22) and repeat 2 months after starting treatment with everolimus and fulvestrant.   - Per RadOnc, repeat radiation not recommended.   - Per NSGY, no surgical intervention indicated.   - Palliative following for pain control.    -- See primary team note for comprehensive assessment and plan --  # Deconditioning  # H/o DVT/PE  # GERD  # Esophagitis  # Gastric ulcers  # FELTON  # Schizoaffective disorder with bipolar features  # Anxiety/depression    Patient was seen and plan of care was discussed with attending physician Dr. Garcia.    Thank you for the opportunity to partake in this patient's plan of care. Please do not hesitate to page with questions. We will continue to follow.     I spent >35 minutes face-to-face or coordinating care of Teresa Sanderson. Over 50% of our time on the unit was spent counseling the patient and/or coordinating care.    Zully Caal PA-C  Hematology/Oncology   Pager: [phone]  Desk phone: *[phone]  ___________________________________________________________________    Subjective & Interval History:    No acute events noted overnight. Pain is improved in that it is no longer excruciating; however it is still is still \"not good,\" rated at 7-8/10 on average.     Physical Exam:    Blood pressure 122/87, pulse 62, temperature 97.7  F (36.5  C), temperature source Oral, resp. rate 18, height 1.829 m (6'), weight 114.7 kg (252 lb 14.4 oz), SpO2 96%, not currently breastfeeding.    General: lying in bed, no acute distress  HEENT: sclera anicteric  Neck: supple, normal ROM  Resp:  normal respiratory effort on ambient air  MSK: warm and well-perfused, normal tone  Skin: no rashes on limited exam, no jaundice  Neuro: Alert and interactive, moves all extremities equally, no focal deficits    Labs & Studies: I personally reviewed the " following studies:  ROUTINE LABS (Last four results):  CMP  Recent Labs   Lab 04/12/25  0753 04/09/25  0551 04/08/25  1403    137 140   POTASSIUM 4.7 4.1 4.0   CHLORIDE 103 102 102   CO2 26 25 25   ANIONGAP 8 10 13   * 148* 126*   BUN 14.7 19.4 17.4   CR 1.09* 1.51* 1.78*   GFRESTIMATED 62 42* 34*   NANDO 9.6 9.4 10.2     CBCNo lab results found in last 7 days.  INRNo lab results found in last 7 days.  Medications list for reference:  Current Facility-Administered Medications   Medication Dose Route Frequency Provider Last Rate Last Admin    acetaminophen (TYLENOL) tablet 975 mg  975 mg Oral TID Anca Wallace MD   975 mg at 04/14/25 0615    artificial saliva (BIOTENE MT) solution 1 spray  1 spray Mouth/Throat Q1H PRN Jonny Berman MD        benzocaine-menthol (CHLORASEPTIC MAX) 15-10 MG lozenge 1 lozenge  1 lozenge Buccal Q1H PRN Jonny Berman MD        bisacodyl (DULCOLAX) suppository 10 mg  10 mg Rectal Daily PRN Jonny Berman MD   10 mg at 04/10/25 1654    Buprenorphine HCl (BELBUCA) buccal film 150 mcg  150 mcg Buccal Q12H MAICO (08/20) Anca Wallace MD   150 mcg at 04/14/25 0938    calcium carbonate (TUMS) chewable tablet 1,000 mg  1,000 mg Oral 4x Daily PRN Jonny Berman MD        diclofenac (VOLTAREN) 1 % topical gel 2 g  2 g Topical 4x Daily Severson, Hayley, MD   2 g at 04/13/25 1938    gabapentin (NEURONTIN) capsule 300 mg  300 mg Oral TID Anca Wallace MD   300 mg at 04/14/25 0615    guaiFENesin-dextromethorphan (ROBITUSSIN DM) 100-10 MG/5ML syrup 10 mL  10 mL Oral Q4H PRN Jonny Berman MD        hydrOXYzine HCl (ATARAX) tablet 25 mg  25 mg Oral 4x Daily PRN Jonny Berman MD   25 mg at 04/11/25 0930    Lidocaine (LIDOCARE) 4 % Patch 3 patch  3 patch Transdermal Q24H Severson, Hayley, MD   3 patch at 04/14/25 1003    lidocaine (LMX4) cream   Topical Q1H PRN Jonny Berman MD        lidocaine 1 % 0.1-1 mL  0.1-1 mL Other Q1H PRN Jonny Berman MD         melatonin tablet 5 mg  5 mg Oral At Bedtime PRN Jonny Berman MD   5 mg at 04/14/25 0009    menthol-zinc oxide (CALMOSEPTINE) 0.44-20.6 % ointment OINT   Topical 4x Daily PRN Jonny Berman MD        methocarbamol (ROBAXIN) tablet 1,000 mg  1,000 mg Oral TID Severson, Hayley, MD   1,000 mg at 04/14/25 0828    naloxone (NARCAN) injection 0.2 mg  0.2 mg Intravenous Q2 Min PRN Waibel, Micheline, RPH        Or    naloxone (NARCAN) injection 0.4 mg  0.4 mg Intravenous Q2 Min PRN Waibel, Micheline, RPH        Or    naloxone (NARCAN) injection 0.2 mg  0.2 mg Intramuscular Q2 Min PRN Waibel, Micheline, RPH        Or    naloxone (NARCAN) injection 0.4 mg  0.4 mg Intramuscular Q2 Min PRN Waibel, Micheline, RPH        nicotine (NICODERM CQ) 14 MG/24HR 24 hr patch 1 patch  1 patch Transdermal Daily Guerita Vazquez MD   1 patch at 04/14/25 0832    OLANZapine (zyPREXA) tablet 2.5 mg  2.5 mg Oral Daily PRN Jonny Berman MD   2.5 mg at 04/12/25 0508    OLANZapine (zyPREXA) tablet 5-10 mg  5-10 mg Oral At Bedtime PRN Jonny Berman MD        ondansetron (ZOFRAN ODT) ODT tab 4 mg  4 mg Oral Q6H PRN Jonny Berman MD   4 mg at 04/07/25 1420    Or    ondansetron (ZOFRAN) injection 4 mg  4 mg Intravenous Q6H PRN Jonny Berman MD        oxyCODONE IR (ROXICODONE) tablet 10 mg  10 mg Oral Q4H PRN Jonny Livingston MD   10 mg at 04/14/25 0830    pantoprazole (PROTONIX) EC tablet 40 mg  40 mg Oral BID AC Jonny Berman MD   40 mg at 04/14/25 0829    Patient is already receiving anticoagulation with heparin, enoxaparin (LOVENOX), warfarin (COUMADIN)  or other anticoagulant medication   Does not apply Continuous PRN Jonny Berman MD        polyethylene glycol (MIRALAX) Packet 17 g  17 g Oral BID PRN Jonny Berman MD   17 g at 04/14/25 1008    prochlorperazine (COMPAZINE) injection 10 mg  10 mg Intravenous Q6H PRN Jonny Berman MD        Or    prochlorperazine (COMPAZINE) tablet 10 mg  10 mg Oral Q6H PRN Jonny Berman MD   10 mg at 04/08/25  1131    QUEtiapine (SEROquel) tablet 25-50 mg  25-50 mg Oral BID PRN Jonny Berman MD        QUEtiapine (SEROquel) tablet 500 mg  500 mg Oral At Bedtime Severson, Hayley, MD   500 mg at 04/13/25 2122    rivaroxaban ANTICOAGULANT (XARELTO) tablet 20 mg  20 mg Oral Daily with supper Jonny Berman MD   20 mg at 04/13/25 1710    senna-docusate (SENOKOT-S/PERICOLACE) 8.6-50 MG per tablet 1 tablet  1 tablet Oral BID PRJonny Vargas MD        Or    senna-docusate (SENOKOT-S/PERICOLACE) 8.6-50 MG per tablet 2 tablet  2 tablet Oral BID PRJonny Vargas MD   2 tablet at 04/12/25 2004    senna-docusate (SENOKOT-S/PERICOLACE) 8.6-50 MG per tablet 1 tablet  1 tablet Oral BID Jonny Berman MD   1 tablet at 04/07/25 2151    Or    senna-docusate (SENOKOT-S/PERICOLACE) 8.6-50 MG per tablet 2 tablet  2 tablet Oral BID Jonny Berman MD   2 tablet at 04/14/25 0828    sertraline (ZOLOFT) tablet 100 mg  100 mg Oral Daily BeatauJonny MD   100 mg at 04/14/25 0828    sodium chloride (PF) 0.9% PF flush 3 mL  3 mL Intracatheter Q8H [facility] Jonny Berman MD   3 mL at 04/14/25 0615    sodium chloride (PF) 0.9% PF flush 3 mL  3 mL Intracatheter q1 min prn Jonny Berman MD   3 mL at 04/10/25 2010    Vitamin D3 (CHOLECALCIFEROL) tablet 25 mcg  25 mcg Oral Daily Jonny Berman MD   25 mcg at 04/14/25 0828

## 2025-04-14 NOTE — PROGRESS NOTES
Care Management Discharge Note    Discharge Date: 04/14/2025  Discharge Disposition: Home    Discharge Services: Home care?  Discharge DME: Per OT, DME needed:  raised toilet seat  reacher  tub bench  Rolling walker  handheld shower head    Discharge Transportation: Friend/ride coming later this afternoon  Private pay costs discussed: Not applicable    Does the patient's insurance plan have a 3 day qualifying hospital stay waiver?  No    PAS Confirmation Code:  n/a  Patient/family educated on Medicare website which has current facility and service quality ratings: no    Education Provided on the Discharge Plan: No  Persons Notified of Discharge Plans: Patient, bedside RN,   Patient/Family in Agreement with the Plan: yes    Handoff Referral Completed: Yes, [facility] PCP: Internal handoff referral completed    Additional Information:  Writer reached out to MD Jonny Livingston via Vocera messaging to inquire about discharge plan and timing. Per MD Livingston: team is working on placing discharge orders right now, states that her only discharge need is home care PT and she already scheduled a ride for 4pm through her [employer] waver.     Writer went to bedside to review discharge plan with patient. She is in agreement with discharging home today. She confirmed that she arranged a ride through her [employer] waver for between 4 and 5pm, and that her son is going to be coming to the bedside soon to make sure that she gets home safely. Reviewed PT reccs for home with home care  PT - she is agreeable to initial referral being placed and understands that we may not have an accepting confirmed prior to discharge - she is comfortable with this. Teresa validated that SW provided some resources this weekend, declined any additional discharge needs.     While I was at the bedside, PT Diana came to bedside to deliver reacher and shower chair, she will place DME orders for 4 wheel walker and raised toilet seat - patient aware that she needs to contact  [facility] to coordinate  or delivery.     Writer then reached out to patient's [employer] waver , Raz. Writer emailed Raz a full list of what DME was recommended by OT, reviewed that reacher and shower chair were given to patient prior to discharge and additional DME orders were placed. Raz said he will follow up on these and if her insurance does not cover the cost he will take care of using her [employer] waver. Raz will also follow up on home care referral.     Writer reviewed all of the above with bedside RNMicheline who will review discharge instruction with patient.     Home Medical Care  Service Provider Request Status Services Address Phone Fax    [facility] - Intake/Referral Pending - Request Sent -- [address] [place] [address] [phone] --   Home care needs: Home PT  Communications with home care agency: n/a  *Home care orders placed     [place] - CADI worker  Raz Castaneda  [phone]  Email: [email or web address]  Rosibel Dee (supervisor)  [phone]    Oncology Social Worker   Malgorzata  [phone]    IDALMIS Hale  Care Management Department  Covering 5A: [phone] & 5C: [phone] (non-BMT)   Phone: [phone]  Teams  Vocera: weekdays 8:00 am - 4:30 pm.   See Vocera Care Team for off-day coverage

## 2025-04-14 NOTE — PROGRESS NOTES
Prior Authorization Approval    Medication: EVEROLIMUS 10 MG PO TABS  PA Initiated: 4/14/2025  PA Type: Clinical    Insurance: [employer] ([facility]) - Phone [phone] Fax [phone]  [facility] Key / Reference #: [id number] / [id number]   Authorization Effective Dates: 4/14/2025 - 12/31/2025    Expected CoPay: $ 0.00  CoPay Card Eligible: No    Filling Pharmacy: [facility] DISCHARGE - [address]  Comments:  Proactive PA. Please send a script to the discharge pharmacy.    Liz Ponce  [facility] Pharmacy Liaison, [place]  Ph: [phone]  Fax: [phone]  Available on Teams and Felipe

## 2025-04-14 NOTE — PROGRESS NOTES
PALLIATIVE CARE PROGRESS NOTE  [facility]     Patient Name: Teresa Sanderson  Date of Admission: 4/6/2025        Recommendations & Counseling     What's New Today:  See comment's from yesterdays note for history.  Overall, patient is able to tell me that her pain is improving, although still at 7/10 pain. She would like to get this down to 5/10 so that she is better able to take care of herself.  Given persistent pain and decreased PRN use of Oxycodone, we recommend increasing Belbuca to 300 mcg BID. I reviewed her recent breakthrough pain med needs (oxycodone 10 mg up to every 4 hours as needed), and her use has decreased from 5 doses per day to 3 doses yesterday.  She has an appointment with Dr. Tellez in palliative care clinic on 4/22.  Given that we are making a change in her Belbuca near the time of discharge, I have spoken with our clinic coordinator Aisha Wright who will reach out to Mrs. Sanderson on Wednesday and assess how she is doing with new dose.  Discussed with patient that if she has any significant sedation with dose change she can reach out to us as well.  Re-evaluated her tongue and oral mucosa redness today. Does not look to be clinically significant. This would not be a common side effect with Belbuca, and I do not believe it is related to her Belbuca use.  She has a history of Schizoaffective Disorder, Bipolar Type. Her speech seemed more pressured this morning. Discussed with Medicine team, they think this is improved from baseline a few days ago. Psychiatry consulted on 4/8, and they had recommended increasing her Zoloft to 100 mg per day.  She acknowledges that she has a lot happening in her life and is trying to focus on how to improve and treat her emotional pain and grief, in addition to her physical pain. She does not want cancer to take everything from her, and is maintaining a positive outlook.  GOALS OF CARE:   Life-prolonging  without limits   Teresa is looking forward to being able to return home and hopes to be able to care for herself and have the pain controlled and then plans to start everolimus on discharge and will continue on fulvestrant. New baseline PET/CT to be done after discharge and repeat 2 months after starting treatment with everolimus and fulvestrant .      ADVANCE CARE PLANNING:  No health care directive on file. Per system policy, [facility] Decision-makers for Patients With Diminished Decision-making Capacity offers guidance on possible decision-makers. Lives with daughter along with oldest son has been identified as a surrogate decision maker.   There is no POLST form on file,  recommend continued medical treatment and FULL CODE   Code status: Full Code    MEDICAL MANAGEMENT: all medication changes today ordered for you      Acute on chronic back pain: Pain exacerbation is most likely due to bilateral pedicle fractures at L4 which were not present on PET/CT from 2/3/2025. The majority of her lesions within the lumbar spine are chronic in nature and have not significantly progressed since recent imaging.    Today patient is alert and pain is better controlled than it was on admission.  She states that the pain is still significant but improving.    Patient has had several years of pain, some related to metastatic lesions in spine and other pain with unclear etiology.  Patient had been tried on a Butrans patch without control of pain.  She had been on oxycodone without good control of her pain and difficult to get a clear history of how much oxycodone she would take on a regular basis.  I do believe that a longer acting opioid would benefit in pain control and would like to try buprenorphine as this has less side effects than other opioids including less respiratory suppression, less constipation.  Awaiting pharmacy/insurance approval for [facility].      Belbuca as indicated below for pain management, NOT for opioid use  disorder    Increase Belbuca to 300mcg BID - She was started at 150 mcg BID on 4/10. See notes above.  Continue oxycodone 10mg q4hrs PRN   Continue gabapentin to 300mg TID (started on admission 4/6) - pt was not taking gabapentin at home - GFR fluctuating between 34-62.  Will not increase gabapentin dosage at this time but if renal function remains stable as an outpatient, can consider increasing at that time.  Continue Tylenol 975mg TID scheduled and 325mg TID PRN  Continue Robaxin 1000mg TID scheduled   Continue lidocaine patch  Pt on anticoagulation so will not add NSAIDs at this time.        Anxiety/schizoaffective disorder, bipolar type on antipsychotic medications  Appreciate psychiatry consult - medications as per their recs:  Increase Zoloft to 100mg every day  Continue seroquel 500mg at bedtime  Continue PRN zyprexa and hydroxyzine   Palliative care  will continue to support    PSYCHOSOCIAL/SPIRITUAL:  Family son to visit later today   support greatly appreciated    Palliative Care will continue to follow. Thank you for the consult and allowing us to aid in the care of Teresa Sanderson.    Sindhu Carcamo, MS3  Palliative Care Consult Team    Pt seen, interviewed and examined by me with medical student. A/P outlined in this note reflect my findings and recommendations.  Ray Cooper MD  Palliative Medicine Consult Team  Page via [employer]    TT: 41 minutes, with > 50% spent in C/C/E patient/family/care teams re: GOC, POC, Sx management. [id number]           Assessment:           Teresa Sanderson is a 49 year old female with a past medical history of metastatic breast cancer dx 12/2020; widespread bone mets. S/p RT to lumbar spine and RFA/kyphoplasty L4 5/2021. She has been on several different lines of therapy (with some treatment interruptions).  -PET/CT in 04/2023 showed PD in two small bone metastasis in L pelvis, S/p 5 fractions RT to these lesions.   -Some lapses/breaks in treatment due to  complicated social situation.  -Evidence of PD on 12/2023 PET scan.  Changed to Verzenio 01/2024 and Fulvestrant. Again s/p RT to low back and L hip 07/2024. She presented to the ED on 4/6 with 4-5 days of increasing low back pain, unable to control pain with pulm medications.    Since admission, CT scan showed new bilateral pedicle L4 fracture which is most likely causing increased low back pain.  No surgical intervention or radiation indicated at this time.     Today, the patient was seen for:  Progressive, metastatic breast cancer with known bony mets  Low back pain  Cancer related pain  Anxiety  Insomnia  Support  Palliative care encounter           Interval History:     Multidisciplinary collaboration:  All notes reviewed including nursing, medicine, pharmacy, health psychology, physical therapy, spiritual health  Spoke with primary medicine team  Reviewed all events over the past 24 hours    I reviewed this patient's medication profile and medications during this hospitalization.  Notable medications:  Acetaminophen 975mg TID  Voltaren gel QID  Gabapentin 300mg TID started 4/8  Lidocaine patch  Robaxin 1000mg TID  Belbuca 300mcg BID - started 150 mcg 4/10, increased dose today  Oxycodone 10mg q3hrs PRN -4-5 doses daily; only 3 in past 24 hours  Hydromorphone has been discontinued    Zyprexa 2.5mg every day PRN and 5-10mg at bedtime PRN   Atarax 25mg PO  QID PRN   Seroquel 500mg at bedtime  Zoloft 100mg every day (increased from 50 on admission)  Senna 2 tabs BID       Patient/family narrative  Overall pain still present but better than on admission.        Review of Systems:     Besides above, ROS was reviewed and is unremarkable              Physical Exam:   Blood pressure 122/87, pulse 62, temperature 97.7  F (36.5  C), temperature source Oral, resp. rate 18, height 1.829 m (6'), weight 114.7 kg (252 lb 14.4 oz), SpO2 96%, not currently breastfeeding.   Gen: Alert, lying in bed, appears comfortable, no  increased work of breathing, in no acute distress, engaged, appropriate, sensorium intact.  Engages in extended conversation.  ENT: Moist oral mucosa.  Some mild discoloration patches on her tongue that do not resemble thrush or other specific pathology.  No open sores.  Cognition: No significant sedation.  Psychiatric/Behavioral: Mildly pressured speech. Mood and thought content normal.             Current Problem List:   Active Problems:    Uncontrolled pain    Carcinoma of breast metastatic to bone, unspecified laterality (H)    FELTON (acute kidney injury)      Allergies   Allergen Reactions    Contrast Dye      Pt developed nausea after isovue 370 injection on 6/9/21             Data Reviewed:     Reviewed recent labs and pertinent imaging  ROUTINE ICU LABS (Last four results)  CMP  Recent Labs   Lab 04/12/25  0753 04/09/25  0551 04/08/25  1403    137 140   POTASSIUM 4.7 4.1 4.0   CHLORIDE 103 102 102   CO2 26 25 25   ANIONGAP 8 10 13   * 148* 126*   BUN 14.7 19.4 17.4   CR 1.09* 1.51* 1.78*   GFRESTIMATED 62 42* 34*   NANDO 9.6 9.4 10.2     CBC  No lab results found in last 7 days.    INRNo lab results found in last 7 days.  Arterial Blood GasNo lab results found in last 7 days.    X-ray thoracic lumbar standing 4/9:   Impression:   1. Bilateral L4 pedicle fractures. Interval 7 mm anterolisthesis of L4  on L5 concerning for instability.  2. Multilevel degenerative changes of the lumbar spine, pronounced at  L4-5 and L5-S1.

## 2025-04-15 ENCOUNTER — PATIENT OUTREACH (OUTPATIENT)
Dept: CARE COORDINATION | Facility: CLINIC | Age: 50
End: 2025-04-15
Payer: MEDICARE

## 2025-04-15 DIAGNOSIS — Z17.0 MALIGNANT NEOPLASM OF OVERLAPPING SITES OF LEFT BREAST IN FEMALE, ESTROGEN RECEPTOR POSITIVE (H): ICD-10-CM

## 2025-04-15 DIAGNOSIS — Z09 HOSPITAL DISCHARGE FOLLOW-UP: ICD-10-CM

## 2025-04-15 DIAGNOSIS — C79.51 CARCINOMA OF LEFT BREAST METASTATIC TO BONE (H): Primary | ICD-10-CM

## 2025-04-15 DIAGNOSIS — C50.912 CARCINOMA OF LEFT BREAST METASTATIC TO BONE (H): Primary | ICD-10-CM

## 2025-04-15 DIAGNOSIS — C50.812 MALIGNANT NEOPLASM OF OVERLAPPING SITES OF LEFT BREAST IN FEMALE, ESTROGEN RECEPTOR POSITIVE (H): ICD-10-CM

## 2025-04-15 RX ORDER — ALBUTEROL SULFATE 0.83 MG/ML
2.5 SOLUTION RESPIRATORY (INHALATION)
OUTPATIENT
Start: 2025-04-18

## 2025-04-15 RX ORDER — DIPHENHYDRAMINE HYDROCHLORIDE 50 MG/ML
50 INJECTION, SOLUTION INTRAMUSCULAR; INTRAVENOUS
Start: 2025-04-18

## 2025-04-15 RX ORDER — METHYLPREDNISOLONE SODIUM SUCCINATE 40 MG/ML
40 INJECTION INTRAMUSCULAR; INTRAVENOUS
Start: 2025-04-18

## 2025-04-15 RX ORDER — LAMOTRIGINE 25 MG/1
500 TABLET ORAL ONCE
OUTPATIENT
Start: 2025-04-18 | End: 2025-04-18

## 2025-04-15 RX ORDER — MEPERIDINE HYDROCHLORIDE 25 MG/ML
25 INJECTION INTRAMUSCULAR; INTRAVENOUS; SUBCUTANEOUS
OUTPATIENT
Start: 2025-04-18

## 2025-04-15 RX ORDER — EPINEPHRINE 1 MG/ML
0.3 INJECTION, SOLUTION INTRAMUSCULAR; SUBCUTANEOUS EVERY 5 MIN PRN
OUTPATIENT
Start: 2025-04-18

## 2025-04-15 RX ORDER — ALBUTEROL SULFATE 90 UG/1
1-2 INHALANT RESPIRATORY (INHALATION)
Start: 2025-04-18

## 2025-04-15 RX ORDER — DIPHENHYDRAMINE HYDROCHLORIDE 50 MG/ML
25 INJECTION, SOLUTION INTRAMUSCULAR; INTRAVENOUS
Start: 2025-04-18

## 2025-04-15 NOTE — PLAN OF CARE
Physical Therapy Discharge Summary    Reason for therapy discharge:    Discharged to home with home therapy.    Progress towards therapy goal(s). See goals on Care Plan in [facility] electronic health record for goal details.  Goals partially met.  Barriers to achieving goals:   discharge from facility.    Therapy recommendation(s):    Continued therapy is recommended.  Rationale/Recommendations:  Recommend HH PT to facilitate further mobility progression and improve strength/activity tolerance. Recommend 24/7 with ongoing cares from daughter and son who provide PCA services.

## 2025-04-15 NOTE — PLAN OF CARE
Occupational Therapy Discharge Summary    Reason for therapy discharge:    Discharged to home.    Progress towards therapy goal(s). See goals on Care Plan in [facility] electronic health record for goal details.  Goals partially met.  Barriers to achieving goals:   discharge from facility.    Therapy recommendation(s):    No further therapy is recommended. No further OT needs at this time.

## 2025-04-15 NOTE — PROGRESS NOTES
Clinic Care Coordination Contact  [facility]/Voicemail    Clinical Data: Care Coordinator Outreach    Outreach Documentation Number of Outreach Attempt   4/16/2025  12:36 PM 1       Left message on patient's voicemail with call back information and requested return call.      Plan: Care Coordinator will try to reach patient again in 1-2 business days.    GAMA TROTTER RN on 4/16/2025 at 12:36 PM    Per chart review, patient had visit today with Neurosurgery. Pt is in collaboration with palliative care team for care coordination and oncology. RN will do no further outreaches at this time. RN will send MyChart and let pt know to contact us if needing home care support.     GAMA TROTTER RN on 4/17/2025 at 10:38 AM

## 2025-04-16 ENCOUNTER — TELEPHONE (OUTPATIENT)
Dept: ONCOLOGY | Facility: CLINIC | Age: 50
End: 2025-04-16
Payer: MEDICARE

## 2025-04-16 DIAGNOSIS — Z79.899 ENCOUNTER FOR LONG-TERM (CURRENT) USE OF MEDICATIONS: Primary | ICD-10-CM

## 2025-04-16 DIAGNOSIS — C50.812 MALIGNANT NEOPLASM OF OVERLAPPING SITES OF LEFT BREAST IN FEMALE, ESTROGEN RECEPTOR POSITIVE (H): ICD-10-CM

## 2025-04-16 DIAGNOSIS — Z17.0 MALIGNANT NEOPLASM OF OVERLAPPING SITES OF LEFT BREAST IN FEMALE, ESTROGEN RECEPTOR POSITIVE (H): ICD-10-CM

## 2025-04-16 NOTE — ORAL ONC MGMT
Oral Chemotherapy Monitoring Program     Placed call to patient in follow up of oral chemotherapy. Left message requesting call back. No drug names were mentioned. Will update when response received.     Nadya Broderick, PharmD, [facility]  Oral Chemotherapy Monitoring Program  [facility]  [phone]

## 2025-04-17 ENCOUNTER — TELEPHONE (OUTPATIENT)
Dept: PHARMACY | Facility: CLINIC | Age: 50
End: 2025-04-17

## 2025-04-17 ENCOUNTER — PRE VISIT (OUTPATIENT)
Dept: NEUROSURGERY | Facility: CLINIC | Age: 50
End: 2025-04-17

## 2025-04-17 ENCOUNTER — OFFICE VISIT (OUTPATIENT)
Dept: NEUROSURGERY | Facility: CLINIC | Age: 50
End: 2025-04-17
Payer: MEDICARE

## 2025-04-17 ENCOUNTER — TELEPHONE (OUTPATIENT)
Dept: PALLIATIVE CARE | Facility: CLINIC | Age: 50
End: 2025-04-17

## 2025-04-17 VITALS
HEIGHT: 71 IN | BODY MASS INDEX: 35.28 KG/M2 | RESPIRATION RATE: 16 BRPM | HEART RATE: 80 BPM | OXYGEN SATURATION: 97 % | DIASTOLIC BLOOD PRESSURE: 72 MMHG | SYSTOLIC BLOOD PRESSURE: 127 MMHG | WEIGHT: 252 LBS

## 2025-04-17 DIAGNOSIS — S32.048G OTHER CLOSED FRACTURE OF FOURTH LUMBAR VERTEBRA WITH DELAYED HEALING, SUBSEQUENT ENCOUNTER: Primary | ICD-10-CM

## 2025-04-17 RX ORDER — QUETIAPINE FUMARATE 300 MG/1
TABLET, FILM COATED ORAL
COMMUNITY
Start: 2025-04-14

## 2025-04-17 RX ORDER — QUETIAPINE FUMARATE 200 MG/1
TABLET, FILM COATED ORAL
COMMUNITY
Start: 2025-04-14

## 2025-04-17 ASSESSMENT — PAIN SCALES - GENERAL: PAINLEVEL_OUTOF10: SEVERE PAIN (7)
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
## 2025-04-17 NOTE — TELEPHONE ENCOUNTER
MARVEL and sent [employer] message on 4/16 asking pt to call back or reply to give an update on pain control.    MICHAEL Carvajal, RN  Palliative Care Nurse Clinician    [phone] (Direct)  [phone] (Main)  [phone] (Appointment Scheduling)     ----- Message from Anca Wallace sent at 4/11/2025  3:26 PM CDT -----  Hi.  This patient has an appointment with [facility] on 4/22.  She is likely discharging from the hospital in the coming days.  Will you call her several days after admission to see how she is doing with pain control and make sure she understands how she is supposed to take her medications?  I started her on Belbuca.  Thanks so much

## 2025-04-17 NOTE — PATIENT INSTRUCTIONS
Continue with wearing the brace as you are.   No lifting, bending, twisting activities.      There are some changes on the XR that are concerning for worsening of the fracture.    A referral has been placed for you to see Dr. Goodrich for a surgical consult next week.
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
## 2025-04-17 NOTE — TELEPHONE ENCOUNTER
MTM referral from: Transitions of Care (recent hospital discharge, TCU discharge, or ED visit)    MTM referral outreach attempt #2 on April 17, 2025 at 8:53 AM      Outcome: Left Message    Use [facility], [facility], [facility] discharged 4/14 for the carrier/Plan on the flowsheet      Aidhenscornerhart Message Sent    Angelica [facility]  -[facility]  [phone]

## 2025-04-22 ENCOUNTER — HOSPITAL ENCOUNTER (OUTPATIENT)
Dept: PET IMAGING | Facility: CLINIC | Age: 50
Discharge: HOME | End: 2025-04-22
Attending: INTERNAL MEDICINE
Payer: MEDICARE

## 2025-04-22 ENCOUNTER — TELEPHONE (OUTPATIENT)
Dept: ONCOLOGY | Facility: CLINIC | Age: 50
End: 2025-04-22
Payer: MEDICARE

## 2025-04-22 DIAGNOSIS — Z17.0 MALIGNANT NEOPLASM OF OVERLAPPING SITES OF LEFT BREAST IN FEMALE, ESTROGEN RECEPTOR POSITIVE (H): ICD-10-CM

## 2025-04-22 DIAGNOSIS — C50.912 CARCINOMA OF LEFT BREAST METASTATIC TO BONE (H): ICD-10-CM

## 2025-04-22 DIAGNOSIS — C50.812 MALIGNANT NEOPLASM OF OVERLAPPING SITES OF LEFT BREAST IN FEMALE, ESTROGEN RECEPTOR POSITIVE (H): ICD-10-CM

## 2025-04-22 DIAGNOSIS — C79.51 CARCINOMA OF LEFT BREAST METASTATIC TO BONE (H): ICD-10-CM

## 2025-04-22 PROCEDURE — A9552 F18 FDG: HCPCS | Performed by: INTERNAL MEDICINE

## 2025-04-22 PROCEDURE — 78816 PET IMAGE W/CT FULL BODY: CPT | Mod: PS

## 2025-04-22 PROCEDURE — 343N000001 HC RX 343 MED OP 636: Performed by: INTERNAL MEDICINE

## 2025-04-22 PROCEDURE — 78813 PET IMAGE FULL BODY: CPT | Mod: 26 | Performed by: RADIOLOGY

## 2025-04-22 PROCEDURE — 74176 CT ABD & PELVIS W/O CONTRAST: CPT | Mod: 26 | Performed by: RADIOLOGY

## 2025-04-22 PROCEDURE — 71250 CT THORAX DX C-: CPT

## 2025-04-22 PROCEDURE — 74176 CT ABD & PELVIS W/O CONTRAST: CPT

## 2025-04-22 PROCEDURE — 71250 CT THORAX DX C-: CPT | Mod: 26 | Performed by: RADIOLOGY

## 2025-04-22 RX ORDER — FLUDEOXYGLUCOSE F 18 200 MCI/ML
10-18 INJECTION, SOLUTION INTRAVENOUS ONCE
Status: COMPLETED | OUTPATIENT
Start: 2025-04-22 | End: 2025-04-22

## 2025-04-22 RX ADMIN — FLUDEOXYGLUCOSE F 18 14.93 MILLICURIE: 200 INJECTION, SOLUTION INTRAVENOUS at 09:05

## 2025-04-22 NOTE — ORAL ONC MGMT
Oral Chemotherapy Monitoring Program     Placed call to patient in follow up of oral chemotherapy. Left message requesting call back. No drug names were mentioned. Will update when response received.     The purpose of this call was to complete a new teach on Everolimus.    Flor Watson, GabyD  Hematology/Oncology Clinical Pharmacist  [facility]  [phone]

## 2025-04-23 DIAGNOSIS — C79.51 CARCINOMA OF BREAST METASTATIC TO BONE, UNSPECIFIED LATERALITY (H): ICD-10-CM

## 2025-04-23 DIAGNOSIS — N63.0 BREAST MASS: Primary | ICD-10-CM

## 2025-04-23 DIAGNOSIS — Z17.0 MALIGNANT NEOPLASM OF OVERLAPPING SITES OF LEFT BREAST IN FEMALE, ESTROGEN RECEPTOR POSITIVE (H): ICD-10-CM

## 2025-04-23 DIAGNOSIS — C50.912 CARCINOMA OF LEFT BREAST METASTATIC TO BONE (H): ICD-10-CM

## 2025-04-23 DIAGNOSIS — C50.919 MALIGNANT NEOPLASM OF FEMALE BREAST, UNSPECIFIED ESTROGEN RECEPTOR STATUS, UNSPECIFIED LATERALITY, UNSPECIFIED SITE OF BREAST (H): ICD-10-CM

## 2025-04-23 DIAGNOSIS — C79.51 CARCINOMA OF LEFT BREAST METASTATIC TO BONE (H): ICD-10-CM

## 2025-04-23 DIAGNOSIS — C79.51 METASTASIS TO BONE (H): ICD-10-CM

## 2025-04-23 DIAGNOSIS — C79.51 MALIGNANT NEOPLASM METASTATIC TO BONE (H): ICD-10-CM

## 2025-04-23 DIAGNOSIS — C50.812 MALIGNANT NEOPLASM OF OVERLAPPING SITES OF LEFT BREAST IN FEMALE, ESTROGEN RECEPTOR POSITIVE (H): ICD-10-CM

## 2025-04-23 DIAGNOSIS — C50.919 CARCINOMA OF BREAST METASTATIC TO BONE, UNSPECIFIED LATERALITY (H): ICD-10-CM

## 2025-04-24 ENCOUNTER — ONCOLOGY VISIT (OUTPATIENT)
Dept: ONCOLOGY | Facility: CLINIC | Age: 50
End: 2025-04-24
Attending: PHYSICIAN ASSISTANT
Payer: MEDICARE

## 2025-04-24 ENCOUNTER — DOCUMENTATION ONLY (OUTPATIENT)
Dept: ONCOLOGY | Facility: CLINIC | Age: 50
End: 2025-04-24

## 2025-04-24 ENCOUNTER — LAB (OUTPATIENT)
Dept: LAB | Facility: CLINIC | Age: 50
End: 2025-04-24
Attending: INTERNAL MEDICINE
Payer: MEDICARE

## 2025-04-24 VITALS
DIASTOLIC BLOOD PRESSURE: 87 MMHG | HEART RATE: 71 BPM | WEIGHT: 249.1 LBS | BODY MASS INDEX: 34.74 KG/M2 | RESPIRATION RATE: 18 BRPM | SYSTOLIC BLOOD PRESSURE: 135 MMHG | TEMPERATURE: 97.6 F | OXYGEN SATURATION: 95 %

## 2025-04-24 DIAGNOSIS — R52 UNCONTROLLED PAIN: ICD-10-CM

## 2025-04-24 DIAGNOSIS — Z17.0 MALIGNANT NEOPLASM OF OVERLAPPING SITES OF LEFT BREAST IN FEMALE, ESTROGEN RECEPTOR POSITIVE (H): ICD-10-CM

## 2025-04-24 DIAGNOSIS — C50.912 CARCINOMA OF LEFT BREAST METASTATIC TO BONE (H): ICD-10-CM

## 2025-04-24 DIAGNOSIS — C50.812 MALIGNANT NEOPLASM OF OVERLAPPING SITES OF LEFT BREAST IN FEMALE, ESTROGEN RECEPTOR POSITIVE (H): ICD-10-CM

## 2025-04-24 DIAGNOSIS — C50.912 CARCINOMA OF LEFT BREAST METASTATIC TO BONE (H): Primary | ICD-10-CM

## 2025-04-24 DIAGNOSIS — C50.812 MALIGNANT NEOPLASM OF OVERLAPPING SITES OF LEFT BREAST IN FEMALE, ESTROGEN RECEPTOR POSITIVE (H): Primary | ICD-10-CM

## 2025-04-24 DIAGNOSIS — C79.51 CARCINOMA OF LEFT BREAST METASTATIC TO BONE (H): ICD-10-CM

## 2025-04-24 DIAGNOSIS — Z17.0 MALIGNANT NEOPLASM OF OVERLAPPING SITES OF LEFT BREAST IN FEMALE, ESTROGEN RECEPTOR POSITIVE (H): Primary | ICD-10-CM

## 2025-04-24 DIAGNOSIS — C79.51 CARCINOMA OF LEFT BREAST METASTATIC TO BONE (H): Primary | ICD-10-CM

## 2025-04-24 LAB
ALBUMIN SERPL BCG-MCNC: 4 G/DL (ref 3.5–5.2)
ALP SERPL-CCNC: 124 U/L (ref 40–150)
ALT SERPL W P-5'-P-CCNC: 122 U/L (ref 0–50)
ANION GAP SERPL CALCULATED.3IONS-SCNC: 8 MMOL/L (ref 7–15)
AST SERPL W P-5'-P-CCNC: 52 U/L (ref 0–45)
BASOPHILS # BLD AUTO: 0 10E3/UL (ref 0–0.2)
BASOPHILS NFR BLD AUTO: 1 %
BILIRUB SERPL-MCNC: 0.3 MG/DL
BUN SERPL-MCNC: 7.9 MG/DL (ref 6–20)
CALCIUM SERPL-MCNC: 9.5 MG/DL (ref 8.8–10.4)
CHLORIDE SERPL-SCNC: 104 MMOL/L (ref 98–107)
CHOLEST SERPL-MCNC: 204 MG/DL
CREAT SERPL-MCNC: 0.81 MG/DL (ref 0.51–0.95)
EGFRCR SERPLBLD CKD-EPI 2021: 88 ML/MIN/1.73M2
EOSINOPHIL # BLD AUTO: 0.2 10E3/UL (ref 0–0.7)
EOSINOPHIL NFR BLD AUTO: 4 %
ERYTHROCYTE [DISTWIDTH] IN BLOOD BY AUTOMATED COUNT: 12.9 % (ref 10–15)
FASTING STATUS PATIENT QL REPORTED: NO
FASTING STATUS PATIENT QL REPORTED: NO
GLUCOSE SERPL-MCNC: 103 MG/DL (ref 70–99)
HCO3 SERPL-SCNC: 27 MMOL/L (ref 22–29)
HCT VFR BLD AUTO: 29.8 % (ref 35–47)
HDLC SERPL-MCNC: 69 MG/DL
HGB BLD-MCNC: 9.8 G/DL (ref 11.7–15.7)
IMM GRANULOCYTES # BLD: 0.1 10E3/UL
IMM GRANULOCYTES NFR BLD: 1 %
LDLC SERPL CALC-MCNC: 116 MG/DL
LYMPHOCYTES # BLD AUTO: 0.9 10E3/UL (ref 0.8–5.3)
LYMPHOCYTES NFR BLD AUTO: 22 %
MCH RBC QN AUTO: 33.1 PG (ref 26.5–33)
MCHC RBC AUTO-ENTMCNC: 32.9 G/DL (ref 31.5–36.5)
MCV RBC AUTO: 101 FL (ref 78–100)
MONOCYTES # BLD AUTO: 0.5 10E3/UL (ref 0–1.3)
MONOCYTES NFR BLD AUTO: 11 %
NEUTROPHILS # BLD AUTO: 2.6 10E3/UL (ref 1.6–8.3)
NEUTROPHILS NFR BLD AUTO: 62 %
NONHDLC SERPL-MCNC: 135 MG/DL
NRBC # BLD AUTO: 0 10E3/UL
NRBC BLD AUTO-RTO: 0 /100
PLATELET # BLD AUTO: 227 10E3/UL (ref 150–450)
POTASSIUM SERPL-SCNC: 3.4 MMOL/L (ref 3.4–5.3)
PROT SERPL-MCNC: 7.7 G/DL (ref 6.4–8.3)
RBC # BLD AUTO: 2.96 10E6/UL (ref 3.8–5.2)
SODIUM SERPL-SCNC: 139 MMOL/L (ref 135–145)
TRIGL SERPL-MCNC: 93 MG/DL
WBC # BLD AUTO: 4.2 10E3/UL (ref 4–11)

## 2025-04-24 PROCEDURE — 36415 COLL VENOUS BLD VENIPUNCTURE: CPT

## 2025-04-24 PROCEDURE — 80061 LIPID PANEL: CPT

## 2025-04-24 PROCEDURE — 84155 ASSAY OF PROTEIN SERUM: CPT

## 2025-04-24 PROCEDURE — 250N000011 HC RX IP 250 OP 636: Mod: JZ | Performed by: INTERNAL MEDICINE

## 2025-04-24 PROCEDURE — 82378 CARCINOEMBRYONIC ANTIGEN: CPT

## 2025-04-24 PROCEDURE — 85004 AUTOMATED DIFF WBC COUNT: CPT

## 2025-04-24 PROCEDURE — G0463 HOSPITAL OUTPT CLINIC VISIT: HCPCS | Performed by: PHYSICIAN ASSISTANT

## 2025-04-24 PROCEDURE — 86300 IMMUNOASSAY TUMOR CA 15-3: CPT

## 2025-04-24 RX ORDER — LAMOTRIGINE 25 MG/1
500 TABLET ORAL ONCE
Status: COMPLETED | OUTPATIENT
Start: 2025-04-24 | End: 2025-04-24

## 2025-04-24 RX ORDER — EVEROLIMUS 10 MG/1
10 TABLET ORAL DAILY
Qty: 28 TABLET | Refills: 0 | OUTPATIENT
Start: 2025-04-24 | End: 2025-05-22

## 2025-04-24 RX ORDER — PROCHLORPERAZINE MALEATE 10 MG
10 TABLET ORAL EVERY 6 HOURS PRN
Qty: 30 TABLET | Refills: 2 | Status: SHIPPED | OUTPATIENT
Start: 2025-04-24

## 2025-04-24 RX ORDER — CYCLOBENZAPRINE HCL 5 MG
5 TABLET ORAL 3 TIMES DAILY
Qty: 45 TABLET | Refills: 0 | Status: SHIPPED | OUTPATIENT
Start: 2025-04-24

## 2025-04-24 RX ORDER — DEXAMETHASONE 0.5 MG/5ML
SOLUTION ORAL
Qty: 1200 ML | Refills: 1 | Status: SHIPPED | OUTPATIENT
Start: 2025-04-24

## 2025-04-24 RX ADMIN — FULVESTRANT 500 MG: 50 INJECTION INTRAMUSCULAR at 16:10

## 2025-04-24 ASSESSMENT — PAIN SCALES - GENERAL: PAINLEVEL_OUTOF10: MODERATE PAIN (5)

## 2025-04-24 NOTE — NURSING NOTE
"Oncology Rooming Note    April 24, 2025 3:12 PM   Teresa Sanderson is a 49 year old female who presents for:    Chief Complaint   Patient presents with    Blood Draw     Labs obtained via venipuncture 23 gauge butterfly needle, VS taken, checked into next appt    Oncology Clinic Visit     Metastatic Breast Cancer     Initial Vitals: /87   Pulse 71   Temp 97.6  F (36.4  C) (Oral)   Resp 18   Wt 113 kg (249 lb 1.6 oz)   SpO2 95%   BMI 34.74 kg/m   Estimated body mass index is 34.74 kg/m  as calculated from the following:    Height as of 4/17/25: 1.803 m (5' 11\").    Weight as of this encounter: 113 kg (249 lb 1.6 oz). Body surface area is 2.38 meters squared.  Moderate Pain (5) Comment: Data Unavailable   No LMP recorded. (Menstrual status: Tubal ).  Allergies reviewed: Yes  Medications reviewed: Yes    Medications: Medication refills not needed today.  Pharmacy name entered into [facility]:    [id number] - [place], [place] - [address] AT [address]  [facility] - [place], [place] - [address] [facility] [facility]  [facility][id number] - [place], [place] - [address] AT [facility] & [facility]  [facility] #[id number] - [place], [place] - [address] AT [address] [place] [address] & [facility] - [place], [place] - [address]  [facility][id number] - [place], [place] - [address] AT [facility] [address]    Frailty Screening:   Is the patient here for a new oncology consult visit in cancer care? 2. No    PHQ9:  Did this patient require a PHQ9?: No      Clinical concerns: None       Serenity San LPN  4/24/2025              "

## 2025-04-24 NOTE — LETTER
4/24/2025      Teresa Sanderson  [address] [address]      Dear Colleague,    Thank you for referring your patient, Teresa Sanderson, to the [facility]. Please see a copy of my visit note below.    Oncology Visit:   Date on this visit: Apr 24, 2025    Diagnosis:  ER positive left breast cancer metastasized to bones.     Primary Physician: No Ref-Primary, Physician     History Of Present Illness:    Ms. Sanderson is a 49 year old female with a h/o tobacco abuse and DVTs with left breast cancer metastasized to bone. She presented to [place] ED with back pain on 12/5/2020. MRI of the L-spine showed an abnormal L4 lesion with associated right paraspinal mass, abnormalities in L5 and the left iliac bone were also seen. CT C/A/P showed a left breast mass, lytic lesions of T7, L4, and the pelvis, and a 3 cm lesion in the kidney (thought to be a cyst). Ultrasound of the bilateral lower extremities showed a non-occlusive thrombus in the left popliteal vein. Mammogram and ultrasound of the bilateral breasts on 12/17/2020 showed a spiculated mass measuring at least 7.8 cm at 12-1:00 left breast extending from the nipple to 9 cm from the nipple with associated nipple retraction. This mass was biopsied, and showed IDC with surrounding DCIS, grade 3, ER+ 90%, and KY+ 75%.  HER2 was equivocal in approximately 35% of tumor cells by FISH and was negative by IHC.    Metastatic Breast Cancer Treatment:  12/23/2020 - 1/7/2021  Radiation (3000 cGy) to the lumbar spine.  1/29/2021 - 04/2023  Ibrance, zoladex, and anastrozole.  5/17/2021 radiofrequency ablation, kyphoplasty to L4  8/20/2021  Bilateral salpingo-oophorectomies, Ibrance and anastrozole.  She was off anastrozole 12/2021 - 2/2022 and off Ibrance 01/2022 - 02/2022 due to stress and impending homelessness. Off both again 03/2022-04/2022 due to death of her son but restarted in 05/2022.  04/2023  PET/CT with progression in 2 small  metastases in the left pelvis.  Palbociclib continued.  Anastrozole changed to exemestane  5/5/2023  Completed radiation, 2000 cGy in 5 fractions, to the left ilium.  12/11/2023 PET/CT with new left breast lesion, new T7 vertebral metastasis and progression of hypermetabolic foci in the bony pelvis c/w disease progression.  Ibrance and exemestane discontinued.  12/19/2023 Left breast biopsy  [facility] NGS:   ER positive, 3+  100%   WV positive, 1+, 5%   HER2 negative.   AR positive, 1+, 35%   MMR proficient   PD-L1 negative, CPS 0   PTEN positive, 1+ 100%  *Of note, all of the above was IHC, DNA quantity not sufficient for NGS  1/18/24 - present  Fulvestrant + abemaciclib.  Abemaciclib dose reduced to 100 mg PO BID due to hand foot syndrome.  7/18/2024  completed radiation to the left acetabulum and the SI    Interval History: Teresa was admitted 4/6-4/14 due to worsening, uncontrolled pain. She was found to have progression of bone metastases with a bilateral pedicle fracture at L4. She saw our team inpatient with plan to change treatment to everolimus + fulvestrant. Neurosurgery recommended TLSO brace and possible surgery. Palliative care established pain regimen of belbuca with PRN oxycodone as well as continuing gabapentin, methocarbamol, diclofenac topical, and lidocaine patch.     She had NSG follow-up showing continued non union so the recommendation is for surgery for screw stabilization. She notes her back pain is better controlled on new pain regimen. She is using belbuca along with oxycodone 1 tablet about 5x per day.     Has had some heaviness behind L knee and L thigh, feels achy. She has a hard time describing it. She has been compliant with AC. Continues to have generalized stiffness and achiness all over when she is sedentary and then starts to move.     Her mental health is in a more positive place.     Eating and drinking well. Bowels have been moving okay. No nausea. No cough or SOB.       Past  Medical/Surgical History:   Past Medical History:   Diagnosis Date     Anxiety      Breast CA (H) 12/2020     Depression      DVT (deep venous thrombosis) (H) 2014     Left breast mass     x approximately 4-5 months     Pulmonary emboli (H)      Pyelonephritis      Schizoaffective disorder (H)      Tobacco use      Past Surgical History:   Procedure Laterality Date     COLONOSCOPY N/A 7/8/2022    Procedure: COLONOSCOPY, WITH POLYPECTOMY;  Surgeon: Ham Cnao MD;  Location: [facility] OR     ESOPHAGOSCOPY, GASTROSCOPY, DUODENOSCOPY (EGD), COMBINED N/A 3/7/2025    Procedure: ESOPHAGOGASTRODUODENOSCOPY, WITH BIOPSY;  Surgeon: Nguyen Holliday MD;  Location: [facility] OR     IR LUMBAR KYPHOPLASTY VERTEBRAE  5/17/2021     LAPAROSCOPIC SALPINGO-OOPHORECTOMY Bilateral 8/20/2021    Procedure: BILATERAL SALPINGO-OOPHORECTOMY, LAPAROSCOPIC;  Surgeon: Rory Lopez MD;  Location: [facility] OR     TUBAL LIGATION  1998        Allergies   Allergen Reactions     Contrast Dye      Pt developed nausea after isovue 370 injection on 6/9/21        Current Outpatient Medications   Medication Sig Dispense Refill     cyclobenzaprine (FLEXERIL) 5 MG tablet Take 1 tablet (5 mg) by mouth 3 times daily. 45 tablet 0     OLANZapine (ZYPREXA) 5 MG tablet Take 1-2 tablets (5-10 mg) by mouth nightly as needed for sleep. Take 1/2 tablet (2.5mg) daily as needed for hypomania/ tom. 30 tablet 1     QUEtiapine (SEROQUEL) 100 MG tablet Tale 1 tablet (100mg) with 1 tablet (400mg) for total of 500mg by mouth at bedtime 30 tablet 2     QUEtiapine (SEROQUEL) 400 MG tablet Take 1 tablet (400mg) with 1 tablet (100mg) for total of 500mg by mouth at bedtime 30 tablet 2     QUEtiapine (SEROQUEL) 50 MG tablet Take 0.5-1 tablets (25-50 mg) by mouth 2 times daily as needed (for sleep, mood and anxiety). 60 tablet 2     Buprenorphine HCl (BELBUCA) 300 MCG FILM buccal film Place 1 Film (300 mcg) inside cheek every 12 hours for 14 days. 28 Film 0     dexAMETHasone  alcohol-free (DECADRON) 0.1 MG/ML solution Swish 10 mL in mouth for 2 min and spit, four times daily. 1200 mL 1     everolimus (AFINITOR) 10 MG tablet Take 1 tablet (10 mg) by mouth daily for 28 days Avoid grapefruit and grapefruit juice. Take with or without food, but should be consistent. 28 tablet 0     fulvestrant (FASLODEX) 250 MG/5ML injection Inject 500 mg into the muscle every 28 days.       gabapentin (NEURONTIN) 300 MG capsule Take 1 capsule (300 mg) by mouth 3 times daily. 90 capsule 0     hydrOXYzine HCl (ATARAX) 25 MG tablet Take 1 tablet (25 mg) by mouth 4 times daily as needed for anxiety or other (panic). 120 tablet 1     naloxone (NARCAN) 4 MG/0.1ML nasal spray Spray 1 spray (4 mg) into one nostril alternating nostrils as needed for opioid reversal. every 2-3 minutes until assistance arrives 2 each 0     omeprazole (PRILOSEC) 40 MG DR capsule Take 1 capsule (40 mg) by mouth 2 times daily. 112 capsule 0     oxyCODONE IR (ROXICODONE) 10 MG tablet Take 1 tablet (10 mg) by mouth every 4 hours as needed for severe pain. 84 tablet 0     prochlorperazine (COMPAZINE) 10 MG tablet Take 1 tablet (10 mg) by mouth every 6 hours as needed for nausea or vomiting. 30 tablet 2     QUEtiapine (SEROQUEL) 100 MG tablet Take 5 tablets (500 mg) by mouth at bedtime. 150 tablet 0     QUEtiapine (SEROQUEL) 200 MG tablet        QUEtiapine (SEROQUEL) 300 MG tablet        rivaroxaban ANTICOAGULANT (XARELTO) 20 MG TABS tablet Take 1 tablet (20 mg) by mouth daily (with dinner). 90 tablet 3     sertraline (ZOLOFT) 100 MG tablet Take 1 tablet (100 mg) by mouth daily. 30 tablet 0     Vitamin D3 (CHOLECALCIFEROL) 25 mcg (1000 units) tablet Take 1 tablet (25 mcg) by mouth daily. 90 tablet 3     No current facility-administered medications for this visit.   '    Physical exam:  /87   Pulse 71   Temp 97.6  F (36.4  C) (Oral)   Resp 18   Wt 113 kg (249 lb 1.6 oz)   SpO2 95%   BMI 34.74 kg/m    Wt Readings from Last 4  Encounters:   04/24/25 113 kg (249 lb 1.6 oz)   04/17/25 114.3 kg (252 lb)   04/14/25 114.7 kg (252 lb 14.4 oz)   03/27/25 109.8 kg (242 lb)   General:  Well appearing adult female in NAD.  Alert and oriented x 3. Wearing TLSO brace   HEENT:  Normocephalic.  Sclera anicteric.  MMM.  No lesions of the oropharynx.  Lymph:  No palpable cervical, supraclavicular, or axillary LAD.  Chest:  CTA bilaterally.  No wheezes or crackles.  CV:  RRR.    Abd:  Soft/NT/ND.    Ext:  No pitting edema of the bilateral lower extremities.    Musculo:  Moves all 4 extremities appropriately.  Neuro:  Cranial nerves grossly intact.  No tremors or dyskinetic movements.  Able to climb on the exam table unaided.  Psych:  Mood and affect appear normal.  Skin:  No visible concerning skin rashes or lesions    Laboratory/Imaging Studies:   I personally reviewed the below laboratories and images while in clinic today:   04/24/25 14:55   Sodium 139   Potassium 3.4   Chloride 104   Carbon Dioxide (CO2) 27   Urea Nitrogen 7.9   Creatinine 0.81   GFR Estimate 88   Calcium 9.5   Anion Gap 8   Albumin 4.0   Protein Total 7.7   Alkaline Phosphatase 124    (H)   AST 52 (H)   Bilirubin Total 0.3   Cholesterol 204 (H)   Patient Fasting? No  No   Glucose 103 (H)   HDL Cholesterol 69   LDL Cholesterol Calculated 116 (H)   Non HDL Cholesterol 135 (H)   Triglycerides 93   WBC 4.2   Hemoglobin 9.8 (L)   Hematocrit 29.8 (L)   Platelet Count 227   RBC Count 2.96 (L)    (H)   MCH 33.1 (H)   MCHC 32.9   RDW 12.9   % Neutrophils 62   % Lymphocytes 22   % Monocytes 11   % Eosinophils 4   % Basophils 1   % Immature Granulocytes 1   NRBC/W 0   Absolute Neutrophil 2.6   Absolute Lymphocytes 0.9   Absolute Monocytes 0.5   Absolute Eosinophils 0.2   Absolute Basophils 0.0   Absolute Immature Granulocytes 0.1   Absolute NRBCs 0.0   [id number] RESPONSE Rpt (IP)         PET 4/22/25  IMPRESSION:      1.  Stable to mildly increased metabolic activity within the  left  breast mass, which is stable in size.  2.  Multiple osseous lesions demonstrate mildly increased FDG uptake,  for example within the posterior right sixth rib, T8 vertebral body,  and multiple lesions within the left hemipelvis.   3.  Increased hypermetabolism within the left pedicle of L4.  Differential includes worsening metastatic disease, as well as  inflammatory changes related to vertebroplasty/healing pedicle  fracture.  4.  Stable mildly FDG avid lesions in the T7 and T9 vertebral bodies,  as well as within the right femoral head.  5.  Additional scattered osseous and periarticular uptake that is  favored to represent degenerative change.  6.  Diffuse increased throughout the stomach, which again may  represent gastritis.  7.  Grossly stable pulmonary nodules, PET indeterminate due to size.  Increased nodular groundglass opacities within the posterior  costophrenic angles which are more likely to reflect areas of  atelectasis or inflammatory change and disease involvement. Recommend  continued attention on follow-up imaging.  8.  Incidental CT findings, as above     I have personally reviewed the examination and initial interpretation  and I agree with the findings.     KORIN ROD MD     ASSESSMENT/PLAN:   49 year old female with history of DVT and ER positive left breast cancer metastasized to bones.    1.  Metastatic breast cancer: On treatment with abemaciclib and fulvestrant from 01/2024 - 02/2025.  PET/CT on 2/3/2024 with progression of bone metastases (left sacroiliac, T7 vertebrae, right 6th rib, right femoral head).     - She was not compliant with taking her treatment at the time of the PET/CT.  She had been more compliant with treatment however short term follow-up PET scan shows  progression of disease in posterior 6th rib, T8, L hemipelvis, as well as L4.   - She is s/p left iliac bone biopsy on 2/18/2025.  We reviewed that the bone biopsy confirmed metastatic breast cancer, ER positive.   This was sent for [facility] NGS on 2/26/2025 however NGS was unable to be performed on the bone biopsy due to decalcified sample making the DNA quality insufficient. Thus [facility] CDx liquid biopsy for NGS was done today.  -Will start everolimus for next line therapy at this time. This plan was already reviewed inpatient during her admission. She had pharmacy come into the room after the visit for a chemotherapy teach.   - Fulvestrant was given after visit today.     2.  Bone metastases/low back pain with LLE sciatica:  She is s/p XRT to the left ilium as well as the lumbar spine and kyphoplasty of L4--with some extravasation of cement which procedularist is aware of and recommended continued AC indefinitely.   - received radiation to the left acetabulum and the SI, completed on 7/18/2024.  - Most recent PET/CT with disease progression in right 6th rib, T8 vertebral body, multiple lesions in L hemipelvisas well as L pedicle of L4 which may reflect some healing changes.   -Outpatient NSG follow-up with more distraction between fracture pieces seen on XR. Referral to Dr. Goodrich was done for surgical consideration. She should continue to wear TLSO brace and avoid bending/twisting or lifting.   - She is currently taking belbuca, oxycodone, Robaxin, and gabapentin.  Ongoing follow up with palliative medicine.    - Receiving Zometa once every 3 months. Next due early June.     3.  Schizoaffective disorder, bipolar type: She saw psychiatry inpatient. Zoloft was increased to 100 mg daily, continue seroquel 500 mg at bedtime with PRN hydroxyzine as well as seroquel 25-50 mg BID PRN for anxiety.     4.  FDG uptake stomach/dyspepsia: Had EGD done showing esophagitis and non bleeding gastric ulcers. She was positive for H. Pylori and completed initial treatment for this. Plan for stool antigen test one month after completion of PPI.  PPI duration is 8 weeks from EGD.     5.  FELTON: Cr back at baseline after stopping  abemaciclib    6. Transaminitis: New onset today. Liver looked okay on PET scan. Confirmed with her she has been taking tylenol regularly.     60 minutes spent on the date of the encounter doing chart review, review of test results, interpretation of tests, patient visit, and documentation     The longitudinal plan of care for the diagnosis(es)/condition(s) as documented were addressed during this visit. Due to the added complexity in care, I will continue to support [facility] in the subsequent management and with ongoing continuity of care.    Alee De Los Santos PA-C               Again, thank you for allowing me to participate in the care of your patient.        Sincerely,        Alee De Los Santos PA-C    Electronically signed

## 2025-04-24 NOTE — PROGRESS NOTES
"Oral Chemotherapy Monitoring Program    Lab Monitoring Plan  CMP/CBC monthly  Lipid panel 8-12 weeks after start    Subjective/Objective:  Teresa Sanderson is a 49 year old female seen in clinic for an initial visit for oral chemotherapy education.        3/27/2025     2:00 PM 4/16/2025    12:00 PM 4/16/2025    12:10 PM 4/16/2025     1:00 PM 4/18/2025     9:00 AM 4/22/2025    11:00 AM 4/24/2025     4:00 PM   ORAL CHEMOTHERAPY   Assessment Type Refill;Lab Monitoring;Other Discontinuation Initial Work up Left Voicemail Left Voicemail Left Voicemail New Teach   Reason for Discontinuation  Disease progression        Diagnosis Code Breast Cancer Breast Cancer Breast Cancer Breast Cancer Breast Cancer Breast Cancer Breast Cancer   Providers Dr. Dimitrios Plunkett   [facility]   Drug Name Verzenio (abemaciclib) Verzenio (abemaciclib) Afinitor (everolimus) Afinitor (everolimus) Afinitor (everolimus) Afinitor (everolimus) Afinitor (everolimus)   Dose   10 mg 10 mg 10 mg 10 mg 10 mg   Current Schedule   Daily Daily Daily Daily Daily   Cycle Details   Continuous Continuous Continuous Continuous Continuous       Last PHQ-2 Score on record:       11/21/2024     2:19 PM 7/19/2024     1:50 PM   PHQ-2 ( 1999 Pfizer)   Q1: Little interest or pleasure in doing things 1 0   Q2: Feeling down, depressed or hopeless 3 1   PHQ-2 Score 4 1   PHQ-2 Total Score (12-17 Years)- Positive if 3 or more points; Administer PHQ-A if positive 4 1       Vitals:  BP:   BP Readings from Last 1 Encounters:   04/24/25 135/87     Wt Readings from Last 1 Encounters:   04/24/25 113 kg (249 lb 1.6 oz)     Estimated body surface area is 2.38 meters squared as calculated from the following:    Height as of 4/17/25: 1.803 m (5' 11\").    Weight as of an earlier encounter on 4/24/25: 113 kg (249 lb 1.6 oz).    Labs:  _  Result " Component Current Result Ref Range   Sodium 139 (4/24/2025) 135 - 145 mmol/L     _  Result Component Current Result Ref Range   Potassium 3.4 (4/24/2025) 3.4 - 5.3 mmol/L     _  Result Component Current Result Ref Range   Calcium 9.5 (4/24/2025) 8.8 - 10.4 mg/dL     No results found for Mag within last 30 days.     No results found for Phos within last 30 days.     _  Result Component Current Result Ref Range   Albumin 4.0 (4/24/2025) 3.5 - 5.2 g/dL     _  Result Component Current Result Ref Range   Urea Nitrogen 7.9 (4/24/2025) 6.0 - 20.0 mg/dL     _  Result Component Current Result Ref Range   Creatinine 0.81 (4/24/2025) 0.51 - 0.95 mg/dL     _  Result Component Current Result Ref Range   AST 52 (H) (4/24/2025) 0 - 45 U/L     _  Result Component Current Result Ref Range    (H) (4/24/2025) 0 - 50 U/L     _  Result Component Current Result Ref Range   Bilirubin Total 0.3 (4/24/2025) <=1.2 mg/dL     _  Result Component Current Result Ref Range   WBC Count 4.2 (4/24/2025) 4.0 - 11.0 10e3/uL     _  Result Component Current Result Ref Range   Hemoglobin 9.8 (L) (4/24/2025) 11.7 - 15.7 g/dL     _  Result Component Current Result Ref Range   Platelet Count 227 (4/24/2025) 150 - 450 10e3/uL     _  Result Component Current Result Ref Range   Absolute Neutrophils 2.6 (4/24/2025) 1.6 - 8.3 10e3/uL     No results found for ANC within last 30 days.        Assessment:  Patient is appropriate to start therapy.    Plan:  Basic chemotherapy teaching was reviewed with the patient including indication, start date of therapy, dose, administration, adverse effects, missed doses, food and drug interactions, monitoring, side effect management, office contact information, and safe handling. Written materials were provided and all questions answered.    Everolimus prescription was sent to [facility]. Dexamethasone mouthrinse was sent to [facility] earlier today, however they do not have it in stock, and we will have [facility] transfer and send it  with everolimus. Patient confirmed she does have ondansetron at home and will take that if needed for nausea. Aware to avoid grapefruits and grapefruit juice. Written material was provided in clinic, patient reported that she did read through this information on MyChart as well.     Follow-Up:  Initial assessment 7 days after start.       Jacquelin Lutz, PharmD, BCPS  Hematology/Oncology Clinical Pharmacist  Oral Chemotherapy Monitoring Program  [facility]  [phone]

## 2025-04-24 NOTE — NURSING NOTE
Patient was given Faslodex in right ventrogluteal muscle by Ashly JONES and left ventrolgluteal muscle by Harjit Sanders LPN. Patient tolerated well and was discharged. See MAR for details.      Ashly Bowden on 4/24/2025 at 4:11 PM

## 2025-04-24 NOTE — PROGRESS NOTES
Oncology Visit:   Date on this visit: Apr 24, 2025    Diagnosis:  ER positive left breast cancer metastasized to bones.     Primary Physician: No Ref-Primary, Physician     History Of Present Illness:    Ms. Sanderson is a 49 year old female with a h/o tobacco abuse and DVTs with left breast cancer metastasized to bone. She presented to [facility] ED with back pain on 12/5/2020. MRI of the L-spine showed an abnormal L4 lesion with associated right paraspinal mass, abnormalities in L5 and the left iliac bone were also seen. CT C/A/P showed a left breast mass, lytic lesions of T7, L4, and the pelvis, and a 3 cm lesion in the kidney (thought to be a cyst). Ultrasound of the bilateral lower extremities showed a non-occlusive thrombus in the left popliteal vein. Mammogram and ultrasound of the bilateral breasts on 12/17/2020 showed a spiculated mass measuring at least 7.8 cm at 12-1:00 left breast extending from the nipple to 9 cm from the nipple with associated nipple retraction. This mass was biopsied, and showed IDC with surrounding DCIS, grade 3, ER+ 90%, and MS+ 75%.  HER2 was equivocal in approximately 35% of tumor cells by FISH and was negative by IHC.    Metastatic Breast Cancer Treatment:  12/23/2020 - 1/7/2021  Radiation (3000 cGy) to the lumbar spine.  1/29/2021 - 04/2023  Ibrance, zoladex, and anastrozole.  5/17/2021 radiofrequency ablation, kyphoplasty to L4  8/20/2021  Bilateral salpingo-oophorectomies, Ibrance and anastrozole.  She was off anastrozole 12/2021 - 2/2022 and off Ibrance 01/2022 - 02/2022 due to stress and impending homelessness. Off both again 03/2022-04/2022 due to death of her son but restarted in 05/2022.  04/2023  PET/CT with progression in 2 small metastases in the left pelvis.  Palbociclib continued.  Anastrozole changed to exemestane  5/5/2023  Completed radiation, 2000 cGy in 5 fractions, to the left ilium.  12/11/2023 PET/CT with new left breast lesion, new T7 vertebral metastasis and  progression of hypermetabolic foci in the bony pelvis c/w disease progression.  Ibrance and exemestane discontinued.  12/19/2023 Left breast biopsy  [facility] NGS:   ER positive, 3+  100%   TN positive, 1+, 5%   HER2 negative.   AR positive, 1+, 35%   MMR proficient   PD-L1 negative, CPS 0   PTEN positive, 1+ 100%  *Of note, all of the above was IHC, DNA quantity not sufficient for NGS  1/18/24 - present  Fulvestrant + abemaciclib.  Abemaciclib dose reduced to 100 mg PO BID due to hand foot syndrome.  7/18/2024  completed radiation to the left acetabulum and the SI    Interval History: Teresa was admitted 4/6-4/14 due to worsening, uncontrolled pain. She was found to have progression of bone metastases with a bilateral pedicle fracture at L4. She saw our team inpatient with plan to change treatment to everolimus + fulvestrant. Neurosurgery recommended TLSO brace and possible surgery. Palliative care established pain regimen of belbuca with PRN oxycodone as well as continuing gabapentin, methocarbamol, diclofenac topical, and lidocaine patch.     She had NSG follow-up showing continued non union so the recommendation is for surgery for screw stabilization. She notes her back pain is better controlled on new pain regimen. She is using belbuca along with oxycodone 1 tablet about 5x per day.     Has had some heaviness behind L knee and L thigh, feels achy. She has a hard time describing it. She has been compliant with AC. Continues to have generalized stiffness and achiness all over when she is sedentary and then starts to move.     Her mental health is in a more positive place.     Eating and drinking well. Bowels have been moving okay. No nausea. No cough or SOB.       Past Medical/Surgical History:   Past Medical History:   Diagnosis Date    Anxiety     Breast CA (H) 12/2020    Depression     DVT (deep venous thrombosis) (H) 2014    Left breast mass     x approximately 4-5 months    Pulmonary emboli (H)      Pyelonephritis     Schizoaffective disorder (H)     Tobacco use      Past Surgical History:   Procedure Laterality Date    COLONOSCOPY N/A 7/8/2022    Procedure: COLONOSCOPY, WITH POLYPECTOMY;  Surgeon: Ham Cano MD;  Location: [facility] OR    ESOPHAGOSCOPY, GASTROSCOPY, DUODENOSCOPY (EGD), COMBINED N/A 3/7/2025    Procedure: ESOPHAGOGASTRODUODENOSCOPY, WITH BIOPSY;  Surgeon: Nguyen Holliday MD;  Location: [facility] OR    IR LUMBAR KYPHOPLASTY VERTEBRAE  5/17/2021    LAPAROSCOPIC SALPINGO-OOPHORECTOMY Bilateral 8/20/2021    Procedure: BILATERAL SALPINGO-OOPHORECTOMY, LAPAROSCOPIC;  Surgeon: Rory Lopez MD;  Location: [facility] OR    TUBAL LIGATION  1998        Allergies   Allergen Reactions    Contrast Dye      Pt developed nausea after isovue 370 injection on 6/9/21        Current Outpatient Medications   Medication Sig Dispense Refill    cyclobenzaprine (FLEXERIL) 5 MG tablet Take 1 tablet (5 mg) by mouth 3 times daily. 45 tablet 0    OLANZapine (ZYPREXA) 5 MG tablet Take 1-2 tablets (5-10 mg) by mouth nightly as needed for sleep. Take 1/2 tablet (2.5mg) daily as needed for hypomania/ tom. 30 tablet 1    QUEtiapine (SEROQUEL) 100 MG tablet Tale 1 tablet (100mg) with 1 tablet (400mg) for total of 500mg by mouth at bedtime 30 tablet 2    QUEtiapine (SEROQUEL) 400 MG tablet Take 1 tablet (400mg) with 1 tablet (100mg) for total of 500mg by mouth at bedtime 30 tablet 2    QUEtiapine (SEROQUEL) 50 MG tablet Take 0.5-1 tablets (25-50 mg) by mouth 2 times daily as needed (for sleep, mood and anxiety). 60 tablet 2    Buprenorphine HCl (BELBUCA) 300 MCG FILM buccal film Place 1 Film (300 mcg) inside cheek every 12 hours for 14 days. 28 Film 0    dexAMETHasone alcohol-free (DECADRON) 0.1 MG/ML solution Swish 10 mL in mouth for 2 min and spit, four times daily. 1200 mL 1    everolimus (AFINITOR) 10 MG tablet Take 1 tablet (10 mg) by mouth daily for 28 days Avoid grapefruit and grapefruit juice. Take with or without  food, but should be consistent. 28 tablet 0    fulvestrant (FASLODEX) 250 MG/5ML injection Inject 500 mg into the muscle every 28 days.      gabapentin (NEURONTIN) 300 MG capsule Take 1 capsule (300 mg) by mouth 3 times daily. 90 capsule 0    hydrOXYzine HCl (ATARAX) 25 MG tablet Take 1 tablet (25 mg) by mouth 4 times daily as needed for anxiety or other (panic). 120 tablet 1    naloxone (NARCAN) 4 MG/0.1ML nasal spray Spray 1 spray (4 mg) into one nostril alternating nostrils as needed for opioid reversal. every 2-3 minutes until assistance arrives 2 each 0    omeprazole (PRILOSEC) 40 MG DR capsule Take 1 capsule (40 mg) by mouth 2 times daily. 112 capsule 0    oxyCODONE IR (ROXICODONE) 10 MG tablet Take 1 tablet (10 mg) by mouth every 4 hours as needed for severe pain. 84 tablet 0    prochlorperazine (COMPAZINE) 10 MG tablet Take 1 tablet (10 mg) by mouth every 6 hours as needed for nausea or vomiting. 30 tablet 2    QUEtiapine (SEROQUEL) 100 MG tablet Take 5 tablets (500 mg) by mouth at bedtime. 150 tablet 0    QUEtiapine (SEROQUEL) 200 MG tablet       QUEtiapine (SEROQUEL) 300 MG tablet       rivaroxaban ANTICOAGULANT (XARELTO) 20 MG TABS tablet Take 1 tablet (20 mg) by mouth daily (with dinner). 90 tablet 3    sertraline (ZOLOFT) 100 MG tablet Take 1 tablet (100 mg) by mouth daily. 30 tablet 0    Vitamin D3 (CHOLECALCIFEROL) 25 mcg (1000 units) tablet Take 1 tablet (25 mcg) by mouth daily. 90 tablet 3     No current facility-administered medications for this visit.   '    Physical exam:  /87   Pulse 71   Temp 97.6  F (36.4  C) (Oral)   Resp 18   Wt 113 kg (249 lb 1.6 oz)   SpO2 95%   BMI 34.74 kg/m    Wt Readings from Last 4 Encounters:   04/24/25 113 kg (249 lb 1.6 oz)   04/17/25 114.3 kg (252 lb)   04/14/25 114.7 kg (252 lb 14.4 oz)   03/27/25 109.8 kg (242 lb)   General:  Well appearing adult female in NAD.  Alert and oriented x 3. Wearing TLSO brace   HEENT:  Normocephalic.  Sclera anicteric.   MMM.  No lesions of the oropharynx.  Lymph:  No palpable cervical, supraclavicular, or axillary LAD.  Chest:  CTA bilaterally.  No wheezes or crackles.  CV:  RRR.    Abd:  Soft/NT/ND.    Ext:  No pitting edema of the bilateral lower extremities.    Musculo:  Moves all 4 extremities appropriately.  Neuro:  Cranial nerves grossly intact.  No tremors or dyskinetic movements.  Able to climb on the exam table unaided.  Psych:  Mood and affect appear normal.  Skin:  No visible concerning skin rashes or lesions    Laboratory/Imaging Studies:   I personally reviewed the below laboratories and images while in clinic today:   04/24/25 14:55   Sodium 139   Potassium 3.4   Chloride 104   Carbon Dioxide (CO2) 27   Urea Nitrogen 7.9   Creatinine 0.81   GFR Estimate 88   Calcium 9.5   Anion Gap 8   Albumin 4.0   Protein Total 7.7   Alkaline Phosphatase 124    (H)   AST 52 (H)   Bilirubin Total 0.3   Cholesterol 204 (H)   Patient Fasting? No  No   Glucose 103 (H)   HDL Cholesterol 69   LDL Cholesterol Calculated 116 (H)   Non HDL Cholesterol 135 (H)   Triglycerides 93   WBC 4.2   Hemoglobin 9.8 (L)   Hematocrit 29.8 (L)   Platelet Count 227   RBC Count 2.96 (L)    (H)   MCH 33.1 (H)   MCHC 32.9   RDW 12.9   % Neutrophils 62   % Lymphocytes 22   % Monocytes 11   % Eosinophils 4   % Basophils 1   % Immature Granulocytes 1   NRBC/W 0   Absolute Neutrophil 2.6   Absolute Lymphocytes 0.9   Absolute Monocytes 0.5   Absolute Eosinophils 0.2   Absolute Basophils 0.0   Absolute Immature Granulocytes 0.1   Absolute NRBCs 0.0   [id number] RESPONSE Rpt (IP)         PET 4/22/25  IMPRESSION:      1.  Stable to mildly increased metabolic activity within the left  breast mass, which is stable in size.  2.  Multiple osseous lesions demonstrate mildly increased FDG uptake,  for example within the posterior right sixth rib, T8 vertebral body,  and multiple lesions within the left hemipelvis.   3.  Increased hypermetabolism within the  left pedicle of L4.  Differential includes worsening metastatic disease, as well as  inflammatory changes related to vertebroplasty/healing pedicle  fracture.  4.  Stable mildly FDG avid lesions in the T7 and T9 vertebral bodies,  as well as within the right femoral head.  5.  Additional scattered osseous and periarticular uptake that is  favored to represent degenerative change.  6.  Diffuse increased throughout the stomach, which again may  represent gastritis.  7.  Grossly stable pulmonary nodules, PET indeterminate due to size.  Increased nodular groundglass opacities within the posterior  costophrenic angles which are more likely to reflect areas of  atelectasis or inflammatory change and disease involvement. Recommend  continued attention on follow-up imaging.  8.  Incidental CT findings, as above     I have personally reviewed the examination and initial interpretation  and I agree with the findings.     KORIN ROD MD     ASSESSMENT/PLAN:   49 year old female with history of DVT and ER positive left breast cancer metastasized to bones.    1.  Metastatic breast cancer: On treatment with abemaciclib and fulvestrant from 01/2024 - 02/2025.  PET/CT on 2/3/2024 with progression of bone metastases (left sacroiliac, T7 vertebrae, right 6th rib, right femoral head).     - She was not compliant with taking her treatment at the time of the PET/CT.  She had been more compliant with treatment however short term follow-up PET scan shows  progression of disease in posterior 6th rib, T8, L hemipelvis, as well as L4.   - She is s/p left iliac bone biopsy on 2/18/2025.  We reviewed that the bone biopsy confirmed metastatic breast cancer, ER positive.  This was sent for [facility] NGS on 2/26/2025 however NGS was unable to be performed on the bone biopsy due to decalcified sample making the DNA quality insufficient. Thus Guardant 360 CDx liquid biopsy for NGS was done today.  -Will start everolimus for next line therapy at this  time. This plan was already reviewed inpatient during her admission. She had pharmacy come into the room after the visit for a chemotherapy teach.   - Fulvestrant was given after visit today.     2.  Bone metastases/low back pain with LLE sciatica:  She is s/p XRT to the left ilium as well as the lumbar spine and kyphoplasty of L4--with some extravasation of cement which procedularist is aware of and recommended continued AC indefinitely.   - received radiation to the left acetabulum and the SI, completed on 7/18/2024.  - Most recent PET/CT with disease progression in right 6th rib, T8 vertebral body, multiple lesions in L hemipelvisas well as L pedicle of L4 which may reflect some healing changes.   -Outpatient NSG follow-up with more distraction between fracture pieces seen on XR. Referral to Dr. Goodrich was done for surgical consideration. She should continue to wear TLSO brace and avoid bending/twisting or lifting.   - She is currently taking belbuca, oxycodone, Robaxin, and gabapentin.  Ongoing follow up with palliative medicine.    - Receiving Zometa once every 3 months. Next due early June.     3.  Schizoaffective disorder, bipolar type: She saw psychiatry inpatient. Zoloft was increased to 100 mg daily, continue seroquel 500 mg at bedtime with PRN hydroxyzine as well as seroquel 25-50 mg BID PRN for anxiety.     4.  FDG uptake stomach/dyspepsia: Had EGD done showing esophagitis and non bleeding gastric ulcers. She was positive for H. Pylori and completed initial treatment for this. Plan for stool antigen test one month after completion of PPI.  PPI duration is 8 weeks from EGD.     5.  FELTON: Cr back at baseline after stopping abemaciclib    6. Transaminitis: New onset today. Liver looked okay on PET scan. Confirmed with her she has been taking tylenol regularly.     60 minutes spent on the date of the encounter doing chart review, review of test results, interpretation of tests, patient visit, and documentation      The longitudinal plan of care for the diagnosis(es)/condition(s) as documented were addressed during this visit. Due to the added complexity in care, I will continue to support Cleiris in the subsequent management and with ongoing continuity of care.    Alee De Los Santos PA-C

## 2025-04-24 NOTE — NURSING NOTE
Chief Complaint   Patient presents with    Blood Draw     Labs obtained via venipuncture 23 gauge butterfly needle, VS taken, checked into next appt     Guardant signature obtained and given to first floor lab staff.

## 2025-04-25 LAB
CANCER AG15-3 SERPL-ACNC: 59 U/ML
CEA SERPL-MCNC: 5 NG/ML

## 2025-04-29 DIAGNOSIS — C50.919 MALIGNANT NEOPLASM OF FEMALE BREAST, UNSPECIFIED ESTROGEN RECEPTOR STATUS, UNSPECIFIED LATERALITY, UNSPECIFIED SITE OF BREAST (H): ICD-10-CM

## 2025-04-29 DIAGNOSIS — C50.812 MALIGNANT NEOPLASM OF OVERLAPPING SITES OF LEFT BREAST IN FEMALE, ESTROGEN RECEPTOR POSITIVE (H): ICD-10-CM

## 2025-04-29 DIAGNOSIS — C79.51 MALIGNANT NEOPLASM METASTATIC TO BONE (H): ICD-10-CM

## 2025-04-29 DIAGNOSIS — C79.51 METASTASIS TO BONE (H): ICD-10-CM

## 2025-04-29 DIAGNOSIS — Z17.0 MALIGNANT NEOPLASM OF OVERLAPPING SITES OF LEFT BREAST IN FEMALE, ESTROGEN RECEPTOR POSITIVE (H): ICD-10-CM

## 2025-04-29 DIAGNOSIS — N63.0 BREAST MASS: Primary | ICD-10-CM

## 2025-05-02 ENCOUNTER — MYC REFILL (OUTPATIENT)
Dept: INTERNAL MEDICINE | Facility: CLINIC | Age: 50
End: 2025-05-02
Payer: MEDICARE

## 2025-05-02 DIAGNOSIS — F25.0 SCHIZOAFFECTIVE DISORDER, BIPOLAR TYPE (H): Primary | ICD-10-CM

## 2025-05-02 NOTE — TELEPHONE ENCOUNTER
Last seen: 02/10/2025  RTC: 6-8 Weeks  Any patient initiated cancellations or no shows since last visit? No  Next appt: None, pt needs to be scheduled.  Last filled: 04/14/2025     Incoming refill from patient via [facility] by patient or caregiver    Medication requested:   Pending Prescriptions:                       Disp   Refills    QUEtiapine (SEROQUEL) 200 MG tablet                           From chart note:    Medications:  -Start hydroxyzine 25mg QID PRN   -Continue Olanzapine 5-10mg at bedtime for sleep and mood stabilization    -Continue Quetiapine to 500mg at bedtime (patient takes 400-500mg at bedtime and 100mg once she wakes up early morning)   -Continue Sertraline 50mg daily    Is note signed/closed? Yes    Is this a 90 day request for a psych medication? No    LPNs - Please check  if controlled and send to provider  Others - Send to RNs

## 2025-05-05 LAB — LAB GUARDANT ONC MSI-HIGH: NOT DETECTED

## 2025-05-06 ENCOUNTER — TELEPHONE (OUTPATIENT)
Dept: ONCOLOGY | Facility: CLINIC | Age: 50
End: 2025-05-06
Payer: MEDICARE

## 2025-05-06 NOTE — ORAL ONC MGMT
Oral Chemotherapy Monitoring Program    Subjective/Objective:  Teresa Sanderson is a 49 year old female contacted by phone for a follow-up visit for oral chemotherapy. Called Teersa this morning for an initial assessment of everolimus (Afinitor). She reports a start date of 4/29/25 and has not missed any doses in the first week. Teresa says she is not experiencing any side effects at all thus far which is fantastic news. Denies any mouth sores, edema, diarrhea, constipation. Of note she does state she has not received the dexamethasone mouth rinse yet, counseled her to call the pharmacy and ask if it can be mailed to her.        4/16/2025    12:10 PM 4/16/2025     1:00 PM 4/18/2025     9:00 AM 4/22/2025    11:00 AM 4/24/2025     4:00 PM 5/2/2025     9:00 AM 5/6/2025     9:00 AM   ORAL CHEMOTHERAPY   Assessment Type Initial Work up Left Voicemail Left Voicemail Left Voicemail New Teach Left Voicemail Initial Follow up   Diagnosis Code Breast Cancer Breast Cancer Breast Cancer Breast Cancer Breast Cancer Breast Cancer Breast Cancer   Providers Dr. Dimitrios Plunkett   [facility]   Drug Name Afinitor (everolimus) Afinitor (everolimus) Afinitor (everolimus) Afinitor (everolimus) Afinitor (everolimus) Afinitor (everolimus) Afinitor (everolimus)   Dose 10 mg 10 mg 10 mg 10 mg 10 mg 10 mg 10 mg   Current Schedule Daily Daily Daily Daily Daily Daily Daily   Cycle Details Continuous Continuous Continuous Continuous Continuous Continuous Continuous   Start Date of Last Cycle       4/29/2025   Doses missed in last 2 weeks       0       Last PHQ-2 Score on record:       11/21/2024     2:19 PM 7/19/2024     1:50 PM   PHQ-2 ( 1999 [employer])   Q1: Little interest or pleasure in doing things 1 0   Q2: Feeling down, depressed or hopeless 3 1   PHQ-2 Score 4 1   PHQ-2 Total Score (12-17 Years)-  "Positive if 3 or more points; Administer PHQ-A if positive 4 1       Vitals:  BP:   BP Readings from Last 1 Encounters:   04/24/25 135/87     Wt Readings from Last 1 Encounters:   04/24/25 113 kg (249 lb 1.6 oz)     Estimated body surface area is 2.38 meters squared as calculated from the following:    Height as of 4/17/25: 1.803 m (5' 11\").    Weight as of 4/24/25: 113 kg (249 lb 1.6 oz).    Labs:  _  Result Component Current Result Ref Range   Sodium 139 (4/24/2025) 135 - 145 mmol/L     _  Result Component Current Result Ref Range   Potassium 3.4 (4/24/2025) 3.4 - 5.3 mmol/L     _  Result Component Current Result Ref Range   Calcium 9.5 (4/24/2025) 8.8 - 10.4 mg/dL     No results found for Mag within last 30 days.     No results found for Phos within last 30 days.     _  Result Component Current Result Ref Range   Albumin 4.0 (4/24/2025) 3.5 - 5.2 g/dL     _  Result Component Current Result Ref Range   Urea Nitrogen 7.9 (4/24/2025) 6.0 - 20.0 mg/dL     _  Result Component Current Result Ref Range   Creatinine 0.81 (4/24/2025) 0.51 - 0.95 mg/dL     _  Result Component Current Result Ref Range   AST 52 (H) (4/24/2025) 0 - 45 U/L     _  Result Component Current Result Ref Range    (H) (4/24/2025) 0 - 50 U/L     _  Result Component Current Result Ref Range   Bilirubin Total 0.3 (4/24/2025) <=1.2 mg/dL     _  Result Component Current Result Ref Range   WBC Count 4.2 (4/24/2025) 4.0 - 11.0 10e3/uL     _  Result Component Current Result Ref Range   Hemoglobin 9.8 (L) (4/24/2025) 11.7 - 15.7 g/dL     _  Result Component Current Result Ref Range   Platelet Count 227 (4/24/2025) 150 - 450 10e3/uL     _  Result Component Current Result Ref Range   Absolute Neutrophils 2.6 (4/24/2025) 1.6 - 8.3 10e3/uL     No results found for ANC within last 30 days.          Assessment/Plan:  Teresa is tolerating the everolimus extremely well, okay to continue taking as prescribed.    Follow-Up:  5/13 Dr. Plunkett appt    Refill " Due:  5/27    Terence Díaz, PharmD, BCOP  Oral Chemotherapy Monitoring Program  [facility]  [phone]

## 2025-05-09 ENCOUNTER — MYC REFILL (OUTPATIENT)
Dept: INTERNAL MEDICINE | Facility: CLINIC | Age: 50
End: 2025-05-09

## 2025-05-09 ENCOUNTER — OFFICE VISIT (OUTPATIENT)
Dept: NEUROSURGERY | Facility: CLINIC | Age: 50
End: 2025-05-09
Attending: PHYSICIAN ASSISTANT
Payer: MEDICARE

## 2025-05-09 VITALS — OXYGEN SATURATION: 100 % | DIASTOLIC BLOOD PRESSURE: 83 MMHG | SYSTOLIC BLOOD PRESSURE: 119 MMHG | HEART RATE: 71 BPM

## 2025-05-09 DIAGNOSIS — S32.009A CLOSED FRACTURE OF PEDICLE OF LUMBAR VERTEBRA, INITIAL ENCOUNTER (H): ICD-10-CM

## 2025-05-09 DIAGNOSIS — S32.048G OTHER CLOSED FRACTURE OF FOURTH LUMBAR VERTEBRA WITH DELAYED HEALING, SUBSEQUENT ENCOUNTER: Primary | ICD-10-CM

## 2025-05-09 DIAGNOSIS — C79.51 METASTASIS TO BONE (H): ICD-10-CM

## 2025-05-09 DIAGNOSIS — G89.3 CANCER ASSOCIATED PAIN: ICD-10-CM

## 2025-05-09 ASSESSMENT — PAIN SCALES - GENERAL: PAINLEVEL_OUTOF10: SEVERE PAIN (7)

## 2025-05-09 NOTE — LETTER
5/9/2025       RE: Teresa Sanderson  [address]  [facility] [address]     Dear Colleague,    Thank you for referring your patient, Teresa Sanderson, to the [facility] NEUROSURGERY CLINIC [facility]. Please see a copy of my visit note below.    5/9/2025  Neurosurgery Clinic Visit    History of present illness:  Teresa Sanderson is a 49 year old female with a history of breast cancer w/ mets to spine (Dx 2020 T7 and L4, s/p radiation, chemo) and DVT on Xarelto, presenting to discuss surgical options for known L4 fracture with bilateral pedicle involvement. She previously had radiation from L3-5 and kyphoplasty at L4. Continues to have low back pain.    Physical exam:   /83 (BP Location: Right arm, Patient Position: Sitting, Cuff Size: Adult Large)   Pulse 71   SpO2 100%   General: Awake and alert and in no acute distress.  Pulm: Breathing comfortably on room air  Motor: Good muscle bulk throughout. 5 out of 5 strength in bilateral upper and lower extremities   Sensation: Sensation grossly intact to light touch in all extremities.   Reflexes: 2+ reflexes bilateral biceps, and patellar tendons. Jimenez's reflex negative. Bilateral clonus negative.     Imaging:  MR Thoracic Spine w/o Contrast (04/07/2025 9:36 PM)   Impression: 1. Redemonstration of metastatic lesion of T7 without evidence of acute fracture. Multiple additional subtle thoracic vertebral metastases. No high-grade thoracic spinal canal or neural foraminal stenosis. 2. No myelopathic cord signal. 3. Metastatic lesion of L4 with changes of vertebroplasty. No vertebral body height loss. Bilateral L4 pedicle fractures better demonstrated on CT. 4. Additional sclerotic lumbar vertebral metastases better demonstrated on CT. Persistent metastatic lesions in the iliac bones, greater on the left. 5. Multilevel lumbar spondylosis greatest at L4-5 with moderate bilateral neural  foraminal stenosis.     Assessment:  # Breast cancer with spine metastases (T7 and L4)   Previously had radiation from L3-5 and kyphoplasty at L4. However, continues to have pain and recently found to have pedicle fractures bilaterally on L4. Bilateral pedicle involvement and slight increase in slippage (although seen in MRI and CT) are concerning for possible instability. Remains full strength without sensory deficits on examination. MRI thoracic and lumbar spine was unremarkable.     Risks including but not limited to bleeding, infection, CSF leak, new neurological deficits, benefits and alternatives of the procedure were discussed and patient agreed to proceed with surgical intervention.      Plan:  - PAC prior to surgery   - Will be scheduled for L3-5 pedicle screw placement     Patient seen and discussed with Dr. Modesta Goodrich MD.  Approximately 30 minutes were spent in chart and image review, evaluation of the patient and assessment of next steps.    Bo Vazquez MD   PGY-1, Department of Neurosurgery  [facility]    REVIEWED ASSOCIATED CLINICAL DATA AND HISTORY FOUND IN THE MEDICAL RECORD:  Past Medical History:   Past Medical History:   Diagnosis Date     Anxiety      Breast CA (H) 12/2020     Depression      DVT (deep venous thrombosis) (H) 2014     Left breast mass     x approximately 4-5 months     Pulmonary emboli (H)      Pyelonephritis      Schizoaffective disorder (H)      Tobacco use        Surgical History:   Past Surgical History:   Procedure Laterality Date     COLONOSCOPY N/A 7/8/2022    Procedure: COLONOSCOPY, WITH POLYPECTOMY;  Surgeon: Ham Cano MD;  [place] [facility] [place]     ESOPHAGOSCOPY, GASTROSCOPY, DUODENOSCOPY (EGD), COMBINED N/A 3/7/2025    Procedure: ESOPHAGOGASTRODUODENOSCOPY, WITH BIOPSY;  Surgeon: Nguyen Holliday MD;  [place] [facility] [place]     IR LUMBAR KYPHOPLASTY VERTEBRAE  5/17/2021     LAPAROSCOPIC SALPINGO-OOPHORECTOMY Bilateral 8/20/2021     Procedure: BILATERAL SALPINGO-OOPHORECTOMY, LAPAROSCOPIC;  Surgeon: Rory Lopez MD;  Location: [facility] OR     TUBAL LIGATION  1998       Social history:   Social History     Tobacco Use     Smoking status: Some Days     Current packs/day: 0.25     Average packs/day: 0.3 packs/day for 14.0 years (3.5 ttl pk-yrs)     Types: Cigarettes     Start date: 5/13/2011     Passive exposure: Current     Smokeless tobacco: Never     Tobacco comments:     4-5 per day   Vaping Use     Vaping status: Never Used   Substance Use Topics     Alcohol use: Not Currently     Comment: occ     Drug use: Yes     Types: Marijuana     Comment: occ       Family history:   Family History   Problem Relation Age of Onset     Lung Cancer Mother      Lung Cancer Father      Lupus Brother      Kidney Disease Brother      Diabetes Daughter      Asthma Son      Cardiovascular Maternal Aunt      Hypertension Other      Diabetes Other      Deep Vein Thrombosis (DVT) No family hx of        Medications:  Current Outpatient Medications   Medication Sig Dispense Refill     cyclobenzaprine (FLEXERIL) 5 MG tablet Take 1 tablet (5 mg) by mouth 3 times daily. 45 tablet 0     dexAMETHasone alcohol-free (DECADRON) 0.1 MG/ML solution Swish 10 mL in mouth for 2 min and spit, four times daily. 1200 mL 1     everolimus (AFINITOR) 10 MG tablet Take 1 tablet (10 mg) by mouth daily for 28 days Avoid grapefruit and grapefruit juice. Take with or without food, but should be consistent. 28 tablet 0     fulvestrant (FASLODEX) 250 MG/5ML injection Inject 500 mg into the muscle every 28 days.       gabapentin (NEURONTIN) 300 MG capsule Take 1 capsule (300 mg) by mouth 3 times daily. 90 capsule 0     hydrOXYzine HCl (ATARAX) 25 MG tablet Take 1 tablet (25 mg) by mouth 4 times daily as needed for anxiety or other (panic). 120 tablet 1     naloxone (NARCAN) 4 MG/0.1ML nasal spray Spray 1 spray (4 mg) into one nostril alternating nostrils as needed for opioid reversal.  every 2-3 minutes until assistance arrives 2 each 0     prochlorperazine (COMPAZINE) 10 MG tablet Take 1 tablet (10 mg) by mouth every 6 hours as needed for nausea or vomiting. 30 tablet 2     rivaroxaban ANTICOAGULANT (XARELTO) 20 MG TABS tablet Take 1 tablet (20 mg) by mouth daily (with dinner). 90 tablet 3     Vitamin D3 (CHOLECALCIFEROL) 25 mcg (1000 units) tablet Take 1 tablet (25 mcg) by mouth daily. 90 tablet 3     Buprenorphine HCl (BELBUCA) 300 MCG FILM buccal film Place 1 Film (300 mcg) inside cheek every 12 hours. 28 Film 0     OLANZapine (ZYPREXA) 5 MG tablet Take 1-2 tablets (5-10 mg) by mouth nightly as needed for sleep. Take 1/2 tablet (2.5mg) daily as needed for hypomania/ tom. 30 tablet 3     oxyCODONE IR (ROXICODONE) 10 MG tablet Take 1 tablet (10 mg) by mouth every 4 hours as needed for severe pain. 90 tablet 0     [START ON 6/12/2025] QUEtiapine (SEROQUEL) 100 MG tablet Tale 1 tablet (100mg) with 1 tablet (400mg) for total of 500mg by mouth at bedtime 30 tablet 2     QUEtiapine (SEROQUEL) 200 MG tablet Take 1 tablet (200 mg) by mouth at bedtime. 30 tablet 0     QUEtiapine (SEROQUEL) 300 MG tablet Take 1 tablet (300 mg) by mouth at bedtime. 30 tablet 0     [START ON 6/12/2025] QUEtiapine (SEROQUEL) 400 MG tablet Take 1 tablet (400mg) with 1 tablet (100mg) for total of 500mg by mouth at bedtime 30 tablet 2     QUEtiapine (SEROQUEL) 50 MG tablet Take 0.5-1 tablets (25-50 mg) by mouth 2 times daily as needed (for sleep, mood and anxiety). 60 tablet 2     sertraline (ZOLOFT) 100 MG tablet Take 1 tablet (100 mg) by mouth daily. 30 tablet 3     No current facility-administered medications for this visit.     Allergies:   Allergies   Allergen Reactions     Contrast Dye      Pt developed nausea after isovue 370 injection on 6/9/21    I have seen this patient with the resident and formulated a plan and agree with this note.  AMP    Again, thank you for allowing me to participate in the care of your  patient.      Sincerely,    Modesta Goodrich MD

## 2025-05-09 NOTE — PROGRESS NOTES
5/9/2025  Neurosurgery Clinic Visit    History of present illness:  Teresa Sanderson is a 49 year old female with a history of breast cancer w/ mets to spine (Dx 2020 T7 and L4, s/p radiation, chemo) and DVT on Xarelto, presenting to discuss surgical options for known L4 fracture with bilateral pedicle involvement. She previously had radiation from L3-5 and kyphoplasty at L4. Continues to have low back pain.    Physical exam:   /83 (BP Location: Right arm, Patient Position: Sitting, Cuff Size: Adult Large)   Pulse 71   SpO2 100%   General: Awake and alert and in no acute distress.  Pulm: Breathing comfortably on room air  Motor: Good muscle bulk throughout. 5 out of 5 strength in bilateral upper and lower extremities   Sensation: Sensation grossly intact to light touch in all extremities.   Reflexes: 2+ reflexes bilateral biceps, and patellar tendons. Jimenez's reflex negative. Bilateral clonus negative.     Imaging:  MR Thoracic Spine w/o Contrast (04/07/2025 9:36 PM)   Impression: 1. Redemonstration of metastatic lesion of T7 without evidence of acute fracture. Multiple additional subtle thoracic vertebral metastases. No high-grade thoracic spinal canal or neural foraminal stenosis. 2. No myelopathic cord signal. 3. Metastatic lesion of L4 with changes of vertebroplasty. No vertebral body height loss. Bilateral L4 pedicle fractures better demonstrated on CT. 4. Additional sclerotic lumbar vertebral metastases better demonstrated on CT. Persistent metastatic lesions in the iliac bones, greater on the left. 5. Multilevel lumbar spondylosis greatest at L4-5 with moderate bilateral neural foraminal stenosis.     Assessment:  # Breast cancer with spine metastases (T7 and L4)   Previously had radiation from L3-5 and kyphoplasty at L4. However, continues to have pain and recently found to have pedicle fractures bilaterally on L4. Bilateral pedicle involvement and slight increase in slippage (although seen in MRI  and CT) are concerning for possible instability. Remains full strength without sensory deficits on examination. MRI thoracic and lumbar spine was unremarkable.     Risks including but not limited to bleeding, infection, CSF leak, new neurological deficits, benefits and alternatives of the procedure were discussed and patient agreed to proceed with surgical intervention.      Plan:  - PAC prior to surgery   - Will be scheduled for L3-5 pedicle screw placement     Patient seen and discussed with Dr. Modesta Goodrich MD.  Approximately 30 minutes were spent in chart and image review, evaluation of the patient and assessment of next steps.    Bo Vazquez MD   PGY-1, Department of Neurosurgery  [facility]    REVIEWED ASSOCIATED CLINICAL DATA AND HISTORY FOUND IN THE MEDICAL RECORD:  Past Medical History:   Past Medical History:   Diagnosis Date    Anxiety     Breast CA (H) 12/2020    Depression     DVT (deep venous thrombosis) (H) 2014    Left breast mass     x approximately 4-5 months    Pulmonary emboli (H)     Pyelonephritis     Schizoaffective disorder (H)     Tobacco use        Surgical History:   Past Surgical History:   Procedure Laterality Date    COLONOSCOPY N/A 7/8/2022    Procedure: COLONOSCOPY, WITH POLYPECTOMY;  Surgeon: Ham Cano MD;  [place] [facility] [place] [place]    ESOPHAGOSCOPY, GASTROSCOPY, DUODENOSCOPY (EGD), COMBINED N/A 3/7/2025    Procedure: ESOPHAGOGASTRODUODENOSCOPY, WITH BIOPSY;  Surgeon: Nguyen Holliday MD;  Location: [facility] [place]    IR LUMBAR KYPHOPLASTY VERTEBRAE  5/17/2021    LAPAROSCOPIC SALPINGO-OOPHORECTOMY Bilateral 8/20/2021    Procedure: BILATERAL SALPINGO-OOPHORECTOMY, LAPAROSCOPIC;  Surgeon: Rory Lopez MD;  Location: [facility] [place]    TUBAL LIGATION  1998       Social history:   Social History     Tobacco Use    Smoking status: Some Days     Current packs/day: 0.25     Average packs/day: 0.3 packs/day for 14.0 years (3.5 ttl pk-yrs)     Types: Cigarettes      Start date: 5/13/2011     Passive exposure: Current    Smokeless tobacco: Never    Tobacco comments:     4-5 per day   Vaping Use    Vaping status: Never Used   Substance Use Topics    Alcohol use: Not Currently     Comment: occ    Drug use: Yes     Types: Marijuana     Comment: occ       Family history:   Family History   Problem Relation Age of Onset    Lung Cancer Mother     Lung Cancer Father     Lupus Brother     Kidney Disease Brother     Diabetes Daughter     Asthma Son     Cardiovascular Maternal Aunt     Hypertension Other     Diabetes Other     Deep Vein Thrombosis (DVT) No family hx of        Medications:  Current Outpatient Medications   Medication Sig Dispense Refill    cyclobenzaprine (FLEXERIL) 5 MG tablet Take 1 tablet (5 mg) by mouth 3 times daily. 45 tablet 0    dexAMETHasone alcohol-free (DECADRON) 0.1 MG/ML solution Swish 10 mL in mouth for 2 min and spit, four times daily. 1200 mL 1    everolimus (AFINITOR) 10 MG tablet Take 1 tablet (10 mg) by mouth daily for 28 days Avoid grapefruit and grapefruit juice. Take with or without food, but should be consistent. 28 tablet 0    fulvestrant (FASLODEX) 250 MG/5ML injection Inject 500 mg into the muscle every 28 days.      gabapentin (NEURONTIN) 300 MG capsule Take 1 capsule (300 mg) by mouth 3 times daily. 90 capsule 0    hydrOXYzine HCl (ATARAX) 25 MG tablet Take 1 tablet (25 mg) by mouth 4 times daily as needed for anxiety or other (panic). 120 tablet 1    naloxone (NARCAN) 4 MG/0.1ML nasal spray Spray 1 spray (4 mg) into one nostril alternating nostrils as needed for opioid reversal. every 2-3 minutes until assistance arrives 2 each 0    prochlorperazine (COMPAZINE) 10 MG tablet Take 1 tablet (10 mg) by mouth every 6 hours as needed for nausea or vomiting. 30 tablet 2    rivaroxaban ANTICOAGULANT (XARELTO) 20 MG TABS tablet Take 1 tablet (20 mg) by mouth daily (with dinner). 90 tablet 3    Vitamin D3 (CHOLECALCIFEROL) 25 mcg (1000 units) tablet  Take 1 tablet (25 mcg) by mouth daily. 90 tablet 3    Buprenorphine HCl (BELBUCA) 300 MCG FILM buccal film Place 1 Film (300 mcg) inside cheek every 12 hours. 28 Film 0    OLANZapine (ZYPREXA) 5 MG tablet Take 1-2 tablets (5-10 mg) by mouth nightly as needed for sleep. Take 1/2 tablet (2.5mg) daily as needed for hypomania/ tom. 30 tablet 3    oxyCODONE IR (ROXICODONE) 10 MG tablet Take 1 tablet (10 mg) by mouth every 4 hours as needed for severe pain. 90 tablet 0    [START ON 6/12/2025] QUEtiapine (SEROQUEL) 100 MG tablet Tale 1 tablet (100mg) with 1 tablet (400mg) for total of 500mg by mouth at bedtime 30 tablet 2    QUEtiapine (SEROQUEL) 200 MG tablet Take 1 tablet (200 mg) by mouth at bedtime. 30 tablet 0    QUEtiapine (SEROQUEL) 300 MG tablet Take 1 tablet (300 mg) by mouth at bedtime. 30 tablet 0    [START ON 6/12/2025] QUEtiapine (SEROQUEL) 400 MG tablet Take 1 tablet (400mg) with 1 tablet (100mg) for total of 500mg by mouth at bedtime 30 tablet 2    QUEtiapine (SEROQUEL) 50 MG tablet Take 0.5-1 tablets (25-50 mg) by mouth 2 times daily as needed (for sleep, mood and anxiety). 60 tablet 2    sertraline (ZOLOFT) 100 MG tablet Take 1 tablet (100 mg) by mouth daily. 30 tablet 3     No current facility-administered medications for this visit.     Allergies:   Allergies   Allergen Reactions    Contrast Dye      Pt developed nausea after isovue 370 injection on 6/9/21    I have seen this patient with the resident and formulated a plan and agree with this note.  AMP

## 2025-05-09 NOTE — NURSING NOTE
Chief Complaint   Patient presents with    New Patient     New Lumbar Spine          /83 (BP Location: Right arm, Patient Position: Sitting, Cuff Size: Adult Large)   Pulse 71   SpO2 100%       Sandra Mike

## 2025-05-09 NOTE — PATIENT INSTRUCTIONS
You were seen today with Dr. Modesta Goodrich.    Next steps:  Schedule Surgery  Schedule PAC appointment  Schedule 2 week post op follow up and wound check      How to Contact Us  Send a SendinBluet message to your provider.   Call the clinic - your call will be routed appropriately.   Neurosurgery Clinic: [phone]  To speak directly to an RN Care Coordinator:  WONG RN: [phone]      Note: We do our best to check voicemail frequently throughout the day and make every effort to return calls within 1-2 business days. For urgent matters, please use the general clinic phone numbers listed above.        Patient Instructions    Surgery scheduled at [facility] for Cervical/Thoracic/Lumbar Fusion with Dr. Goodrich    Pre-Operative  Pre-operative physical with PAC or primary care physician within 30 days of surgical date.   2-4 night hospitalization stay  Shower procedure  Please shower with antimicrobial soap the night before surgery and morning of surgery. Please refer to showering instruction sheet in folder.  Eating/Drinking  Stop all solid foods 8 hours before arrival time.  Keep drinking clear liquids until 4 hours before arrival time.  Clear liquids include water, clear juice, black coffee, or clear tea without milk, Gatorade, clear soda.   Medications  Hold Aspirin, NSAIDs (Advil/Ibuprofen, Indocin, Naproxen,Nuprin,Relafen/Nabumetone, Diclofenac,Meloxicam, Aleve, Celebrex) x 7 days prior to surgical date  You can take Tylenol (Acetaminophen) for pain, 1000 mg  Do not exceed 3,000 mg per day   If you are on chronic pain medication (oxycodone, Percocet, hydrocodone, Vicodin, Norco, Dilaudid, morphine, MS Contin, naltrexone, Suboxone, etc) or have a pain contract we will reach out to your pain clinic to gather your most recent records. Continue obtaining your pain medications from your current provider until surgery. Our team will manage your acute post-op pain in the hospital and during the recovery period. Your  pain team will continue to manage your chronic pain. Please contact your provider who manages your pain medications to inform them of your upcoming surgery.  Any other medications prescribed, please discuss with PAC or your primary care provider at your pre-operative physical     Smoking Cessation:   You are advised to quit smoking immediately and through recovery to help with healing and reduce risk of complications. For proper healing it is required that you quit using all tobacco products. This includes smoking, chewing, nicotine gums, and nicotine patches.  Once you have been nicotine free for 30 days, please call our clinic and we will enter lab orders for the Urine Nicotine test. Please call your local [facility] to schedule the lab test.   Vaccines  Some people experience temporary side effects from vaccines, and we don't want to confuse these symptoms with side effects or complications from surgery (example: mild fever). Avoid getting your flu or COVID vaccine within 1 week prior to your surgery date, and no earlier than 4 weeks after your surgery.      Pain Management  Dealing with pain  As your body heals, you might feel a stabbing, burning, or aching pain. You may also have some numbness.  Everyone feels pain differently, we may ask you to rate your pain using a pain scale. This will let us know how much pain you feel.   Keep in mind that medicine won't take away all of your pain. It helps to try other ways to relax and ease pain.   Things to help with pain  After surgery, we will give you medicine for your pain. These medications work well, but they can make you drowsy, itchy, or sick to your stomach. If we give you narcotics for pain, try to take the pills with food.   For mild to moderate pain, you can take medication such as Tylenol. These can be used with narcotics, but make sure that your narcotic does not contain Tylenol.   Do NOT drive while taking narcotic pain medication  Do NOT drink  alcohol while using any pain medication  You can utilize ice as needed (no longer than 20 minutes at one time)  Avoid putting heat over the incision.  Avoid NSAIDs (ex. Ibuprofen, aleve, naproxen) for first 12 weeks after surgery as it may cause bleeding and interfere with bone healing     Incision Care  No submerging incision in water such as pools, hot tubs, baths for at least 8 weeks or until incision is healed  You may get your incision wet in the shower. Allow water to run over incision, and gently pat dry.   Remove dressing as instructed upon discharge  Watch for signs of infection  Redness, swelling, warmth, drainage, and fever of 101 degrees or higher  Notify clinic [phone].    Activity Restrictions  For the first 6-8 weeks, no lifting > 10 pounds, no bending, twisting, or overhead reaching.  Take stairs in moderation   Ok to walk as tolerated, take short frequent walks. You may gradually increase the distance as tolerated.   Avoid bed rest and prolonged sitting for longer than 30 minutes (change positions frequently while awake)  No contact sports until after follow up visit  No high impact activities such as; running/jogging, snowmobile or 4 tong riding or any other recreational vehicles  Please call the clinic if you develop any of the following symptoms:  Swelling and/or warmth in one or both legs  Pain or tenderness in your leg, ankle, foot, or arm   Red or discolored skin   Bowel Care:  Many people have constipation (hard stools) after surgery.    To help prevent constipation:   Drink plenty of fluid (8-10 glasses/day)  Eat more fiber, such as whole grain bread, bran cereal, and fruits and vegetables   Stay active by walking  Over the counter stool softener may also help (Senna, Miralax, and Milk of Mangesia)    Post-Op Follow Up Appointments  2 week staple/suture removal with PA/NP  6 week post op with xray prior   3 months post op with xray prior   1 year post op with xray prior  Please call  to schedule follow up and xray appointments: [phone]    Resources  If you are currently employed, you will need to be off work for 4-6 weeks for post op recovery and healing.  Please fax any FMLA/short term disability paperwork to [phone] or send via [employer].  You may call our clinic when you'd like to return to work and we can provide a work letter  To allow staff adequate time to complete paperwork, please send as soon as possible     [facility] - Neurosurgery Clinic  Phone: [phone]  Fax: [phone]

## 2025-05-12 ENCOUNTER — TELEPHONE (OUTPATIENT)
Dept: NEUROSURGERY | Facility: CLINIC | Age: 50
End: 2025-05-12
Payer: MEDICARE

## 2025-05-12 RX ORDER — OXYCODONE HYDROCHLORIDE 10 MG/1
10 TABLET ORAL EVERY 4 HOURS PRN
Qty: 90 TABLET | Refills: 0 | Status: SHIPPED | OUTPATIENT
Start: 2025-05-12

## 2025-05-12 RX ORDER — QUETIAPINE FUMARATE 200 MG/1
200 TABLET, FILM COATED ORAL AT BEDTIME
Qty: 30 TABLET | Refills: 0 | Status: SHIPPED | OUTPATIENT
Start: 2025-05-12

## 2025-05-12 RX ORDER — QUETIAPINE FUMARATE 300 MG/1
300 TABLET, FILM COATED ORAL AT BEDTIME
Qty: 30 TABLET | Refills: 0 | Status: SHIPPED | OUTPATIENT
Start: 2025-05-12

## 2025-05-12 NOTE — TELEPHONE ENCOUNTER
Writer called the patient to confirm if she still needs the refill of Seroquel, what dose she's currently taking, and whether she would like to schedule a future psychiatry appt. The patient reports the following:    -Taking Seroquel 500 mg (200 mg tab + 300 mg tab) nightly at bedtime     -Would like to continue with psychiatry    -Denies any other questions/updates or need for additional refills    Melany Ramirez RN on 5/12/2025 at 2:23 PM

## 2025-05-12 NOTE — TELEPHONE ENCOUNTER
Spoke with patient in [facility] regarding  scheduling surgery with Dr. Modesta Goodrich    Spoke with:  Alcon mcgarry (patient)    Date of Surgery: 7/7/2025    Estimated Arrival time Discussed with Patient:  No    Location of surgery: [facility]     Pre-Op H&P: 6/23/2025 in person with PAC     Post-Op Appts:  7/21/2025 with [employer] ALEKSANDRA    Imaging:  No     Vendor/Rep/Monitoring:   Yes  via Online Portal Confirmation #:     Additional Comments:      All patients questions were answered and patient was instructed to review surgical packet and call back with any questions or concerns.       Shira Samson on 5/12/2025 at 11:00 AM

## 2025-05-12 NOTE — TELEPHONE ENCOUNTER
Received Dogsterhart message from patient requesting refill of oxycodone.     Last refill: 4/14/25  Last office visit: 4/14/25 (inpatient)  Scheduled for follow up not scheduled. Sending Pop[employer] message to pt that this will be her last fill until she is seen in clinic.     Will route request to MD/DO for review.     Reviewed [place]  Report.

## 2025-05-13 ENCOUNTER — ONCOLOGY VISIT (OUTPATIENT)
Dept: ONCOLOGY | Facility: CLINIC | Age: 50
End: 2025-05-13
Attending: INTERNAL MEDICINE
Payer: MEDICARE

## 2025-05-13 VITALS
DIASTOLIC BLOOD PRESSURE: 79 MMHG | WEIGHT: 251 LBS | OXYGEN SATURATION: 98 % | SYSTOLIC BLOOD PRESSURE: 113 MMHG | HEART RATE: 61 BPM | RESPIRATION RATE: 16 BRPM | TEMPERATURE: 98.5 F | BODY MASS INDEX: 35.01 KG/M2

## 2025-05-13 DIAGNOSIS — C50.912 CARCINOMA OF LEFT BREAST METASTATIC TO BONE (H): Primary | ICD-10-CM

## 2025-05-13 DIAGNOSIS — K12.1 STOMATITIS AND MUCOSITIS: ICD-10-CM

## 2025-05-13 DIAGNOSIS — G89.3 CANCER ASSOCIATED PAIN: ICD-10-CM

## 2025-05-13 DIAGNOSIS — K12.30 STOMATITIS AND MUCOSITIS: ICD-10-CM

## 2025-05-13 DIAGNOSIS — C50.812 MALIGNANT NEOPLASM OF OVERLAPPING SITES OF LEFT BREAST IN FEMALE, ESTROGEN RECEPTOR POSITIVE (H): Primary | ICD-10-CM

## 2025-05-13 DIAGNOSIS — S32.048G OTHER CLOSED FRACTURE OF FOURTH LUMBAR VERTEBRA WITH DELAYED HEALING, SUBSEQUENT ENCOUNTER: ICD-10-CM

## 2025-05-13 DIAGNOSIS — C50.812 MALIGNANT NEOPLASM OF OVERLAPPING SITES OF LEFT BREAST IN FEMALE, ESTROGEN RECEPTOR POSITIVE (H): ICD-10-CM

## 2025-05-13 DIAGNOSIS — C79.51 CARCINOMA OF LEFT BREAST METASTATIC TO BONE (H): Primary | ICD-10-CM

## 2025-05-13 DIAGNOSIS — Z17.0 MALIGNANT NEOPLASM OF OVERLAPPING SITES OF LEFT BREAST IN FEMALE, ESTROGEN RECEPTOR POSITIVE (H): ICD-10-CM

## 2025-05-13 DIAGNOSIS — Z51.11 ENCOUNTER FOR ANTINEOPLASTIC CHEMOTHERAPY: ICD-10-CM

## 2025-05-13 DIAGNOSIS — B96.81 GASTRIC ULCER DUE TO HELICOBACTER PYLORI, CHRONIC: ICD-10-CM

## 2025-05-13 DIAGNOSIS — K25.7 GASTRIC ULCER DUE TO HELICOBACTER PYLORI, CHRONIC: ICD-10-CM

## 2025-05-13 DIAGNOSIS — Z17.0 MALIGNANT NEOPLASM OF OVERLAPPING SITES OF LEFT BREAST IN FEMALE, ESTROGEN RECEPTOR POSITIVE (H): Primary | ICD-10-CM

## 2025-05-13 PROCEDURE — G0463 HOSPITAL OUTPT CLINIC VISIT: HCPCS | Performed by: INTERNAL MEDICINE

## 2025-05-13 ASSESSMENT — PAIN SCALES - GENERAL: PAINLEVEL_OUTOF10: NO PAIN (0)

## 2025-05-13 NOTE — TELEPHONE ENCOUNTER
Received OtherInbox message from Dr. Plunkett that pt needs a refill of Belbuca. She will notify pt that this is her last fill of pain medication until she makes an appt with Dr. Tellez. Medopad message also sent to pt yesterday notifying her of this.    Last refill: 4/14/25  Last office visit: 4/14/25 (inpatient)  Scheduled for follow up pending.     Will route request to MD/ for review.     Reviewed [place]  Report.

## 2025-05-13 NOTE — NURSING NOTE
"Oncology Rooming Note    May 13, 2025 8:34 AM   Teresa Sanderson is a 49 year old female who presents for:    Chief Complaint   Patient presents with    Oncology Clinic Visit     Carcinoma of left breast metastatic to bone      Initial Vitals: /79 (BP Location: Right arm, Patient Position: Sitting, Cuff Size: Adult Regular)   Pulse 61   Temp 98.5  F (36.9  C) (Oral)   Resp 16   Wt 113.9 kg (251 lb)   SpO2 98%   BMI 35.01 kg/m   Estimated body mass index is 35.01 kg/m  as calculated from the following:    Height as of 4/17/25: 1.803 m (5' 11\").    Weight as of this encounter: 113.9 kg (251 lb). Body surface area is 2.39 meters squared.  No Pain (0) Comment: Data Unavailable   No LMP recorded. (Menstrual status: Tubal ).  Allergies reviewed: Yes  Medications reviewed: Yes    Medications: MEDICATION REFILLS NEEDED TODAY. Provider was notified.  Pharmacy name entered into [facility]:    [id number] - [place], [place] - [address] AT [address]  [facility] - [place], [place] - [address] [facility]  [id number] - [place], [place] - [address] AT [facility] & [facility]  [facility] #[id number] - [place], [place] - [address] AT [address] [place] [address] [facility] - [place], [place] - [address]  [id number] - [place], [place] - [address] AT [facility] [address]    Frailty Screening:   Is the patient here for a new oncology consult visit in cancer care? 2. No    PHQ9:  Did this patient require a PHQ9?: No      Clinical concerns: needs refill for oxycodone        Hayden Guy              "

## 2025-05-13 NOTE — PROGRESS NOTES
Oncology Visit:   Date on this visit: May 13, 2025    Diagnosis:  ER positive left breast cancer metastasized to bones.     Primary Physician: No Ref-Primary, Physician     History Of Present Illness:    Ms. Sanderson is a 49 year old female with a h/o tobacco abuse and DVTs with left breast cancer metastasized to bone. She presented to [facility] ED with back pain on 12/5/2020. MRI of the L-spine showed an abnormal L4 lesion with associated right paraspinal mass, abnormalities in L5 and the left iliac bone were also seen. CT C/A/P showed a left breast mass, lytic lesions of T7, L4, and the pelvis, and a 3 cm lesion in the kidney (thought to be a cyst). Ultrasound of the bilateral lower extremities showed a non-occlusive thrombus in the left popliteal vein. Mammogram and ultrasound of the bilateral breasts on 12/17/2020 showed a spiculated mass measuring at least 7.8 cm at 12-1:00 left breast extending from the nipple to 9 cm from the nipple with associated nipple retraction. This mass was biopsied, and showed IDC with surrounding DCIS, grade 3, ER+ 90%, and WV+ 75%.  HER2 was equivocal in approximately 35% of tumor cells by FISH and was negative by IHC.    Metastatic Breast Cancer Treatment:  12/23/2020 - 1/7/2021  Radiation (3000 cGy) to the lumbar spine.  1/29/2021 - 04/2023  Ibrance, zoladex, and anastrozole.  5/17/2021 radiofrequency ablation, kyphoplasty to L4  8/20/2021  Bilateral salpingo-oophorectomies, Ibrance and anastrozole.  She was off anastrozole 12/2021 - 2/2022 and off Ibrance 01/2022 - 02/2022 due to stress and impending homelessness. Off both again 03/2022-04/2022 due to death of her son but restarted in 05/2022.  04/2023  PET/CT with progression in 2 small metastases in the left pelvis.  Palbociclib continued.  Anastrozole changed to exemestane  5/5/2023  Completed radiation, 2000 cGy in 5 fractions, to the left ilium.  12/11/2023 PET/CT with new left breast lesion, new T7 vertebral metastasis and  progression of hypermetabolic foci in the bony pelvis c/w disease progression.  Ibrance and exemestane discontinued.  12/19/2023 Left breast biopsy  [facility] NGS:   ER positive, 3+  100%   MA positive, 1+, 5%   HER2 negative.   AR positive, 1+, 35%   MMR proficient   PD-L1 negative, CPS 0   PTEN positive, 1+ 100%  *Of note, all of the above was IHC, DNA quantity not sufficient for NGS  1/18/24 - 4/2/2025  Fulvestrant + abemaciclib.  Abemaciclib dose reduced to 100 mg PO BID due to hand foot syndrome.  7/18/2024  completed radiation to the left acetabulum and the SI  4/2/2025 acute low back pain, pathologic bilateral pedicle L4 fracture.  5/5/2025  Guardant 360 liquid biopsy:   ESR1 D538G  3.8%   ESR1 Y537N   0.1%   MYC amplification   GATA3 [id number]  4/29/2025 - present  Everolimus and fulvestrant.      Interval History:   Teresa comes into clinic today, alongside her daughter, for metastatic breast cancer follow-up.  She notes she ran out of pain medicine approximately 1 week ago.  This includes the Belbuca oral strips as well as oxycodone.  She notes she was told not to take ibuprofen, but has been doing so.  She continues to have low back pain and continues to wear a stabilizing back brace.  She is going to have lumbar spine fusion surgery on July 7.  She also reports focal pain over the left anterior iliac spine.  Infrequently, her left leg has a sensation that it is going to give out on her.  She also notes that her lower extremities get a tingly feeling, like they are falling asleep more easily than they used to.  She denies other new bone or joint aches or pains.  She denies weakness or numbness in either of her lower extremities.  She has had no urinary or bowel retention or incontinence.  She confirms that she has been on treatment with everolimus since April 29.  She notes a mild sore throat on the medication.  She denies that is interfering with her ability to eat.  Appetite is good.  She has no nausea  or diarrhea.  She denies skin rashes or lesions.    Past Medical/Surgical History:   Past Medical History:   Diagnosis Date    Anxiety     Breast CA (H) 12/2020    Depression     DVT (deep venous thrombosis) (H) 2014    Left breast mass     x approximately 4-5 months    Pulmonary emboli (H)     Pyelonephritis     Schizoaffective disorder (H)     Tobacco use      Past Surgical History:   Procedure Laterality Date    COLONOSCOPY N/A 7/8/2022    Procedure: COLONOSCOPY, WITH POLYPECTOMY;  Surgeon: Ham Cano MD;  Location: [facility] OR    ESOPHAGOSCOPY, GASTROSCOPY, DUODENOSCOPY (EGD), COMBINED N/A 3/7/2025    Procedure: ESOPHAGOGASTRODUODENOSCOPY, WITH BIOPSY;  Surgeon: Nguyen Holliday MD;  [place] [facility] [place]    IR LUMBAR KYPHOPLASTY VERTEBRAE  5/17/2021    LAPAROSCOPIC SALPINGO-OOPHORECTOMY Bilateral 8/20/2021    Procedure: BILATERAL SALPINGO-OOPHORECTOMY, LAPAROSCOPIC;  Surgeon: Rory Lopez MD;  [place] [facility] [place]    TUBAL LIGATION  1998        Allergies   Allergen Reactions    Contrast Dye      Pt developed nausea after isovue 370 injection on 6/9/21        Current Outpatient Medications   Medication Sig Dispense Refill    cyclobenzaprine (FLEXERIL) 5 MG tablet Take 1 tablet (5 mg) by mouth 3 times daily. 45 tablet 0    dexAMETHasone alcohol-free (DECADRON) 0.1 MG/ML solution Swish 10 mL in mouth for 2 min and spit, four times daily. 1200 mL 1    everolimus (AFINITOR) 10 MG tablet Take 1 tablet (10 mg) by mouth daily for 28 days Avoid grapefruit and grapefruit juice. Take with or without food, but should be consistent. 28 tablet 0    fulvestrant (FASLODEX) 250 MG/5ML injection Inject 500 mg into the muscle every 28 days.      gabapentin (NEURONTIN) 300 MG capsule Take 1 capsule (300 mg) by mouth 3 times daily. 90 capsule 0    hydrOXYzine HCl (ATARAX) 25 MG tablet Take 1 tablet (25 mg) by mouth 4 times daily as needed for anxiety or other (panic). 120 tablet 1    naloxone (NARCAN) 4 MG/0.1ML nasal spray  Spray 1 spray (4 mg) into one nostril alternating nostrils as needed for opioid reversal. every 2-3 minutes until assistance arrives 2 each 0    OLANZapine (ZYPREXA) 5 MG tablet Take 1-2 tablets (5-10 mg) by mouth nightly as needed for sleep. Take 1/2 tablet (2.5mg) daily as needed for hypomania/ tom. 30 tablet 1    oxyCODONE IR (ROXICODONE) 10 MG tablet Take 1 tablet (10 mg) by mouth every 4 hours as needed for severe pain. 90 tablet 0    prochlorperazine (COMPAZINE) 10 MG tablet Take 1 tablet (10 mg) by mouth every 6 hours as needed for nausea or vomiting. 30 tablet 2    QUEtiapine (SEROQUEL) 100 MG tablet Take 5 tablets (500 mg) by mouth at bedtime. 150 tablet 0    QUEtiapine (SEROQUEL) 100 MG tablet Tale 1 tablet (100mg) with 1 tablet (400mg) for total of 500mg by mouth at bedtime 30 tablet 2    QUEtiapine (SEROQUEL) 200 MG tablet Take 1 tablet (200 mg) by mouth at bedtime. 30 tablet 0    QUEtiapine (SEROQUEL) 300 MG tablet Take 1 tablet (300 mg) by mouth at bedtime. 30 tablet 0    QUEtiapine (SEROQUEL) 400 MG tablet Take 1 tablet (400mg) with 1 tablet (100mg) for total of 500mg by mouth at bedtime 30 tablet 2    QUEtiapine (SEROQUEL) 50 MG tablet Take 0.5-1 tablets (25-50 mg) by mouth 2 times daily as needed (for sleep, mood and anxiety). 60 tablet 2    rivaroxaban ANTICOAGULANT (XARELTO) 20 MG TABS tablet Take 1 tablet (20 mg) by mouth daily (with dinner). 90 tablet 3    sertraline (ZOLOFT) 100 MG tablet Take 1 tablet (100 mg) by mouth daily. 30 tablet 0    Vitamin D3 (CHOLECALCIFEROL) 25 mcg (1000 units) tablet Take 1 tablet (25 mcg) by mouth daily. 90 tablet 3     No current facility-administered medications for this visit.   '  Physical exam:  /79 (BP Location: Right arm, Patient Position: Sitting, Cuff Size: Adult Regular)   Pulse 61   Temp 98.5  F (36.9  C) (Oral)   Resp 16   Wt 113.9 kg (251 lb)   SpO2 98%   BMI 35.01 kg/m    General:  Well appearing adult female in NAD.  Alert and oriented  x 3. Wearing TLSO brace   HEENT:  Normocephalic.  Sclera anicteric.  MMM.  No lesions of the oropharynx.  Lymph:  No palpable cervical, supraclavicular, or axillary LAD.  Chest:  CTA bilaterally.  No wheezes or crackles.  CV:  RRR.  No audible m/r/g.  Abd:  Soft/ND.    Ext:  No pitting edema of the bilateral lower extremities.    Musculo:  Moves all 4 extremities appropriately.  Neuro:  Cranial nerves grossly intact.  No tremors or dyskinetic movements.    Psych:  Mood and affect appear normal.  Skin:  No visible concerning skin rashes or lesions    Laboratory/Imaging Studies:   I personally reviewed the below laboratories and images while in clinic today:    PET 4/22/25  IMPRESSION:      1.  Stable to mildly increased metabolic activity within the left  breast mass, which is stable in size.  2.  Multiple osseous lesions demonstrate mildly increased FDG uptake,  for example within the posterior right sixth rib, T8 vertebral body,  and multiple lesions within the left hemipelvis.   3.  Increased hypermetabolism within the left pedicle of L4.  Differential includes worsening metastatic disease, as well as  inflammatory changes related to vertebroplasty/healing pedicle  fracture.  4.  Stable mildly FDG avid lesions in the T7 and T9 vertebral bodies,  as well as within the right femoral head.  5.  Additional scattered osseous and periarticular uptake that is  favored to represent degenerative change.  6.  Diffuse increased throughout the stomach, which again may  represent gastritis.  7.  Grossly stable pulmonary nodules, PET indeterminate due to size.  Increased nodular groundglass opacities within the posterior  costophrenic angles which are more likely to reflect areas of  atelectasis or inflammatory change and disease involvement. Recommend  continued attention on follow-up imaging.  8.  Incidental CT findings, as above     I have personally reviewed the examination and initial interpretation  and I agree with the  findings.     KORIN ROD MD     4/24/2025 Labs:   Latest Reference Range & Units 04/24/25 14:55   Sodium 135 - 145 mmol/L 139   Potassium 3.4 - 5.3 mmol/L 3.4   Chloride 98 - 107 mmol/L 104   Carbon Dioxide (CO2) 22 - 29 mmol/L 27   Urea Nitrogen 6.0 - 20.0 mg/dL 7.9   Creatinine 0.51 - 0.95 mg/dL 0.81   GFR Estimate >60 mL/min/1.73m2 88   Calcium 8.8 - 10.4 mg/dL 9.5   Anion Gap 7 - 15 mmol/L 8   Albumin 3.5 - 5.2 g/dL 4.0   Protein Total 6.4 - 8.3 g/dL 7.7   Alkaline Phosphatase 40 - 150 U/L 124   ALT 0 - 50 U/L 122 (H)   AST 0 - 45 U/L 52 (H)   Bilirubin Total <=1.2 mg/dL 0.3   Cancer Antigen 15-3 <=25 U/mL 59 (H)   CEA ng/mL 5.0   Cholesterol <200 mg/dL 204 (H)   Patient Fasting?  No  No   Glucose 70 - 99 mg/dL 103 (H)   HDL Cholesterol >=50 mg/dL 69   LDL Cholesterol Calculated <100 mg/dL 116 (H)   Non HDL Cholesterol <130 mg/dL 135 (H)   Triglycerides <150 mg/dL 93   (H): Data is abnormally high   Latest Reference Range & Units 04/24/25 14:55   WBC 4.0 - 11.0 10e3/uL 4.2   Hemoglobin 11.7 - 15.7 g/dL 9.8 (L)   Hematocrit 35.0 - 47.0 % 29.8 (L)   Platelet Count 150 - 450 10e3/uL 227   (L): Data is abnormally low   Latest Reference Range & Units 04/24/25 14:55   Absolute Neutrophil 1.6 - 8.3 10e3/uL 2.6   Absolute Lymphocytes 0.8 - 5.3 10e3/uL 0.9   Absolute Monocytes 0.0 - 1.3 10e3/uL 0.5   Absolute Eosinophils 0.0 - 0.7 10e3/uL 0.2   Absolute Basophils 0.0 - 0.2 10e3/uL 0.0   Absolute Immature Granulocytes <=0.4 10e3/uL 0.1   Absolute NRBCs 10e3/uL 0.0       ASSESSMENT/PLAN:   49 year old female with history of DVT and ER positive left breast cancer metastasized to bones.    1.  Metastatic breast cancer: On treatment with abemaciclib and fulvestrant from 01/2024 - 02/2025.  PET/CT on 2/3/2024 with progression of bone metastases (left sacroiliac, T7 vertebrae, right 6th rib, right femoral head).     - Was on treatment with abemaciclib and fulvestrant.  Presented on 4/6/2025 with acute low back pain.  CT with  bilateral pathologic fracture of pedicle of L4.    - On treatment with everolimus 10 mg daily and fulvestrant since 4/29/2025.  Instructed patient to hold everolimus one week prior to planned surgery (last dose on 6/30/2025).  It is okay to resume taking the medication one week after surgery.  - She is scheduled for next fulvestrant injection on 5/22/2025.  - We reviewed guardant 360 liquid biopsy results which demonstrates ESR1 D538G and ESR1 Y537N mutations.  She is a candidate for next line treatment with elacestrant.  - Labs at time of fulvestrant injection on 5/22/2025.  - Labs, ALEKSANDRA visit, and fulvestrant around 6/19/2025  - Labs, visit with me, and fulvestrant around 7/21/2025.  PET/CT 1-2 business days prior to this visit.    2.  Bone metastases/cancer associated pain:  She is s/p XRT to the left ilium as well as the lumbar spine and kyphoplasty of L4--with some extravasation of cement which procedularist is aware of and recommended continued AC indefinitely. Received radiation to the left acetabulum and the SI, completed on 7/18/2024.    - 4/22/2025 PET was with disease progression in the left ilium, posterior T8, and right posterior 6th rib.  - she has bilateral pedicle fracture of L4.  Plan is for L3-L5 fusion with neurosurgery on 7/7/2025.  - continue to wear TLSO brace and avoid bending/twisting or lifting.   - She is taking belbuca, oxycodone, Robaxin, and gabapentin.  She has no showed her last two appointments with palliative.  She states this was due to oversleeping the day of the appointments.  I emphasized today the importance of regular follow up with palliative medicine.  - Teresa is out of prescriptions for Belbuca and oxycodone.  She receives these through the palliative medicine clinic.  I messaged Dr. Bhupinder Frank.  She will refill the medications but emphasized that if Teresa misses her next follow up with palliative medicine, they will no longer fill these medications.    - Receiving  Zometa once every 3 months. Next due early June.  Will hold off on this dose given planned lumbar spine fusion on 7/7    3.  Schizoaffective disorder, bipolar type: Zoloft 100 mg daily, seroquel 500 mg at bedtime with PRN hydroxyzine as well as seroquel 25-50 mg BID PRN for anxiety.     4.  FDG uptake stomach/dyspepsia: Had EGD done showing esophagitis and non bleeding gastric ulcers. She was positive for H. Pylori and completed initial treatment for this. Plan for stool antigen test one month after completion of PPI.    - advised to stop PPI at this time and will plan to repeat H. Pylori stool antigen with 6/19 labs.    5.  Stomatitis:  Pain in throat likely due to everolimus induced stomatitis.  - recommend swish and swallow of dexamethasone mouthwash.    Follow Up:  - Return visit with Dr. Tellez of palliative medicine within 1 month   - Labs at time of fulvestrant injection on 5/22/2025.  - Labs, ALEKSANDRA visit, and fulvestrant around 6/19/2025  - Labs, visit with me, and fulvestrant around 7/21/2025.  PET/CT 1-2 business days prior to this visit.    The longitudinal plan of care for the diagnosis(es)/condition(s) as documented were addressed during this visit. Due to the added complexity in care, I will continue to support Alcon-malgorzata in the subsequent management and with ongoing continuity of care.    I spent 65 minutes on the date of the encounter doing chart review, review of test results, interpretation of tests, patient visit, documentation, and discussion with other provider(s)

## 2025-05-13 NOTE — LETTER
5/13/2025      Teresa Sanderson  [address] [facility] [address]      Dear Colleague,    Thank you for referring your patient, Teresa Sanderson, to the [facility]. Please see a copy of my visit note below.    Oncology Visit:   Date on this visit: May 13, 2025    Diagnosis:  ER positive left breast cancer metastasized to bones.     Primary Physician: No Ref-Primary, Physician     History Of Present Illness:    Ms. Sanderson is a 49 year old female with a h/o tobacco abuse and DVTs with left breast cancer metastasized to bone. She presented to [facility] ED with back pain on 12/5/2020. MRI of the L-spine showed an abnormal L4 lesion with associated right paraspinal mass, abnormalities in L5 and the left iliac bone were also seen. CT C/A/P showed a left breast mass, lytic lesions of T7, L4, and the pelvis, and a 3 cm lesion in the kidney (thought to be a cyst). Ultrasound of the bilateral lower extremities showed a non-occlusive thrombus in the left popliteal vein. Mammogram and ultrasound of the bilateral breasts on 12/17/2020 showed a spiculated mass measuring at least 7.8 cm at 12-1:00 left breast extending from the nipple to 9 cm from the nipple with associated nipple retraction. This mass was biopsied, and showed IDC with surrounding DCIS, grade 3, ER+ 90%, and CA+ 75%.  HER2 was equivocal in approximately 35% of tumor cells by FISH and was negative by IHC.    Metastatic Breast Cancer Treatment:  12/23/2020 - 1/7/2021  Radiation (3000 cGy) to the lumbar spine.  1/29/2021 - 04/2023  Ibrance, zoladex, and anastrozole.  5/17/2021 radiofrequency ablation, kyphoplasty to L4  8/20/2021  Bilateral salpingo-oophorectomies, Ibrance and anastrozole.  She was off anastrozole 12/2021 - 2/2022 and off Ibrance 01/2022 - 02/2022 due to stress and impending homelessness. Off both again 03/2022-04/2022 due to death of her son but restarted in 05/2022.  04/2023  PET/CT with progression in 2 small  metastases in the left pelvis.  Palbociclib continued.  Anastrozole changed to exemestane  5/5/2023  Completed radiation, 2000 cGy in 5 fractions, to the left ilium.  12/11/2023 PET/CT with new left breast lesion, new T7 vertebral metastasis and progression of hypermetabolic foci in the bony pelvis c/w disease progression.  Ibrance and exemestane discontinued.  12/19/2023 Left breast biopsy  [facility] NGS:   ER positive, 3+  100%   MD positive, 1+, 5%   HER2 negative.   AR positive, 1+, 35%   MMR proficient   PD-L1 negative, CPS 0   PTEN positive, 1+ 100%  *Of note, all of the above was IHC, DNA quantity not sufficient for NGS  1/18/24 - 4/2/2025  Fulvestrant + abemaciclib.  Abemaciclib dose reduced to 100 mg PO BID due to hand foot syndrome.  7/18/2024  completed radiation to the left acetabulum and the SI  4/2/2025 acute low back pain, pathologic bilateral pedicle L4 fracture.  5/5/2025  [facility] liquid biopsy:   ESR1 D538G  3.8%   ESR1 Y537N   0.1%   MYC amplification   GATA3 [id number]  4/29/2025 - present  Everolimus and fulvestrant.      Interval History:   Teresa comes into clinic today, alongside her daughter, for metastatic breast cancer follow-up.  She notes she ran out of pain medicine approximately 1 week ago.  This includes the Belbuca oral strips as well as oxycodone.  She notes she was told not to take ibuprofen, but has been doing so.  She continues to have low back pain and continues to wear a stabilizing back brace.  She is going to have lumbar spine fusion surgery on July 7.  She also reports focal pain over the left anterior iliac spine.  Infrequently, her left leg has a sensation that it is going to give out on her.  She also notes that her lower extremities get a tingly feeling, like they are falling asleep more easily than they used to.  She denies other new bone or joint aches or pains.  She denies weakness or numbness in either of her lower extremities.  She has had no urinary or bowel  retention or incontinence.  She confirms that she has been on treatment with everolimus since April 29.  She notes a mild sore throat on the medication.  She denies that is interfering with her ability to eat.  Appetite is good.  She has no nausea or diarrhea.  She denies skin rashes or lesions.    Past Medical/Surgical History:   Past Medical History:   Diagnosis Date     Anxiety      Breast CA (H) 12/2020     Depression      DVT (deep venous thrombosis) (H) 2014     Left breast mass     x approximately 4-5 months     Pulmonary emboli (H)      Pyelonephritis      Schizoaffective disorder (H)      Tobacco use      Past Surgical History:   Procedure Laterality Date     COLONOSCOPY N/A 7/8/2022    Procedure: COLONOSCOPY, WITH POLYPECTOMY;  Surgeon: Ham Cano MD;  Location: [facility] OR     ESOPHAGOSCOPY, GASTROSCOPY, DUODENOSCOPY (EGD), COMBINED N/A 3/7/2025    Procedure: ESOPHAGOGASTRODUODENOSCOPY, WITH BIOPSY;  Surgeon: Nguyen Holliday MD;  [place] [facility] [place]     IR LUMBAR KYPHOPLASTY VERTEBRAE  5/17/2021     LAPAROSCOPIC SALPINGO-OOPHORECTOMY Bilateral 8/20/2021    Procedure: BILATERAL SALPINGO-OOPHORECTOMY, LAPAROSCOPIC;  Surgeon: Rory Lopez MD;  Location: [facility] OR     TUBAL LIGATION  1998        Allergies   Allergen Reactions     Contrast Dye      Pt developed nausea after isovue 370 injection on 6/9/21        Current Outpatient Medications   Medication Sig Dispense Refill     cyclobenzaprine (FLEXERIL) 5 MG tablet Take 1 tablet (5 mg) by mouth 3 times daily. 45 tablet 0     dexAMETHasone alcohol-free (DECADRON) 0.1 MG/ML solution Swish 10 mL in mouth for 2 min and spit, four times daily. 1200 mL 1     everolimus (AFINITOR) 10 MG tablet Take 1 tablet (10 mg) by mouth daily for 28 days Avoid grapefruit and grapefruit juice. Take with or without food, but should be consistent. 28 tablet 0     fulvestrant (FASLODEX) 250 MG/5ML injection Inject 500 mg into the muscle every 28 days.       gabapentin  (NEURONTIN) 300 MG capsule Take 1 capsule (300 mg) by mouth 3 times daily. 90 capsule 0     hydrOXYzine HCl (ATARAX) 25 MG tablet Take 1 tablet (25 mg) by mouth 4 times daily as needed for anxiety or other (panic). 120 tablet 1     naloxone (NARCAN) 4 MG/0.1ML nasal spray Spray 1 spray (4 mg) into one nostril alternating nostrils as needed for opioid reversal. every 2-3 minutes until assistance arrives 2 each 0     OLANZapine (ZYPREXA) 5 MG tablet Take 1-2 tablets (5-10 mg) by mouth nightly as needed for sleep. Take 1/2 tablet (2.5mg) daily as needed for hypomania/ tom. 30 tablet 1     oxyCODONE IR (ROXICODONE) 10 MG tablet Take 1 tablet (10 mg) by mouth every 4 hours as needed for severe pain. 90 tablet 0     prochlorperazine (COMPAZINE) 10 MG tablet Take 1 tablet (10 mg) by mouth every 6 hours as needed for nausea or vomiting. 30 tablet 2     QUEtiapine (SEROQUEL) 100 MG tablet Take 5 tablets (500 mg) by mouth at bedtime. 150 tablet 0     QUEtiapine (SEROQUEL) 100 MG tablet Tale 1 tablet (100mg) with 1 tablet (400mg) for total of 500mg by mouth at bedtime 30 tablet 2     QUEtiapine (SEROQUEL) 200 MG tablet Take 1 tablet (200 mg) by mouth at bedtime. 30 tablet 0     QUEtiapine (SEROQUEL) 300 MG tablet Take 1 tablet (300 mg) by mouth at bedtime. 30 tablet 0     QUEtiapine (SEROQUEL) 400 MG tablet Take 1 tablet (400mg) with 1 tablet (100mg) for total of 500mg by mouth at bedtime 30 tablet 2     QUEtiapine (SEROQUEL) 50 MG tablet Take 0.5-1 tablets (25-50 mg) by mouth 2 times daily as needed (for sleep, mood and anxiety). 60 tablet 2     rivaroxaban ANTICOAGULANT (XARELTO) 20 MG TABS tablet Take 1 tablet (20 mg) by mouth daily (with dinner). 90 tablet 3     sertraline (ZOLOFT) 100 MG tablet Take 1 tablet (100 mg) by mouth daily. 30 tablet 0     Vitamin D3 (CHOLECALCIFEROL) 25 mcg (1000 units) tablet Take 1 tablet (25 mcg) by mouth daily. 90 tablet 3     No current facility-administered medications for this visit.    '  Physical exam:  /79 (BP Location: Right arm, Patient Position: Sitting, Cuff Size: Adult Regular)   Pulse 61   Temp 98.5  F (36.9  C) (Oral)   Resp 16   Wt 113.9 kg (251 lb)   SpO2 98%   BMI 35.01 kg/m    General:  Well appearing adult female in NAD.  Alert and oriented x 3. Wearing TLSO brace   HEENT:  Normocephalic.  Sclera anicteric.  MMM.  No lesions of the oropharynx.  Lymph:  No palpable cervical, supraclavicular, or axillary LAD.  Chest:  CTA bilaterally.  No wheezes or crackles.  CV:  RRR.  No audible m/r/g.  Abd:  Soft/ND.    Ext:  No pitting edema of the bilateral lower extremities.    Musculo:  Moves all 4 extremities appropriately.  Neuro:  Cranial nerves grossly intact.  No tremors or dyskinetic movements.    Psych:  Mood and affect appear normal.  Skin:  No visible concerning skin rashes or lesions    Laboratory/Imaging Studies:   I personally reviewed the below laboratories and images while in clinic today:    PET 4/22/25  IMPRESSION:      1.  Stable to mildly increased metabolic activity within the left  breast mass, which is stable in size.  2.  Multiple osseous lesions demonstrate mildly increased FDG uptake,  for example within the posterior right sixth rib, T8 vertebral body,  and multiple lesions within the left hemipelvis.   3.  Increased hypermetabolism within the left pedicle of L4.  Differential includes worsening metastatic disease, as well as  inflammatory changes related to vertebroplasty/healing pedicle  fracture.  4.  Stable mildly FDG avid lesions in the T7 and T9 vertebral bodies,  as well as within the right femoral head.  5.  Additional scattered osseous and periarticular uptake that is  favored to represent degenerative change.  6.  Diffuse increased throughout the stomach, which again may  represent gastritis.  7.  Grossly stable pulmonary nodules, PET indeterminate due to size.  Increased nodular groundglass opacities within the posterior  costophrenic angles  which are more likely to reflect areas of  atelectasis or inflammatory change and disease involvement. Recommend  continued attention on follow-up imaging.  8.  Incidental CT findings, as above     I have personally reviewed the examination and initial interpretation  and I agree with the findings.     KORIN ROD MD     4/24/2025 Labs:   Latest Reference Range & Units 04/24/25 14:55   Sodium 135 - 145 mmol/L 139   Potassium 3.4 - 5.3 mmol/L 3.4   Chloride 98 - 107 mmol/L 104   Carbon Dioxide (CO2) 22 - 29 mmol/L 27   Urea Nitrogen 6.0 - 20.0 mg/dL 7.9   Creatinine 0.51 - 0.95 mg/dL 0.81   GFR Estimate >60 mL/min/1.73m2 88   Calcium 8.8 - 10.4 mg/dL 9.5   Anion Gap 7 - 15 mmol/L 8   Albumin 3.5 - 5.2 g/dL 4.0   Protein Total 6.4 - 8.3 g/dL 7.7   Alkaline Phosphatase 40 - 150 U/L 124   ALT 0 - 50 U/L 122 (H)   AST 0 - 45 U/L 52 (H)   Bilirubin Total <=1.2 mg/dL 0.3   Cancer Antigen 15-3 <=25 U/mL 59 (H)   CEA ng/mL 5.0   Cholesterol <200 mg/dL 204 (H)   Patient Fasting?  No  No   Glucose 70 - 99 mg/dL 103 (H)   HDL Cholesterol >=50 mg/dL 69   LDL Cholesterol Calculated <100 mg/dL 116 (H)   Non HDL Cholesterol <130 mg/dL 135 (H)   Triglycerides <150 mg/dL 93   (H): Data is abnormally high   Latest Reference Range & Units 04/24/25 14:55   WBC 4.0 - 11.0 10e3/uL 4.2   Hemoglobin 11.7 - 15.7 g/dL 9.8 (L)   Hematocrit 35.0 - 47.0 % 29.8 (L)   Platelet Count 150 - 450 10e3/uL 227   (L): Data is abnormally low   Latest Reference Range & Units 04/24/25 14:55   Absolute Neutrophil 1.6 - 8.3 10e3/uL 2.6   Absolute Lymphocytes 0.8 - 5.3 10e3/uL 0.9   Absolute Monocytes 0.0 - 1.3 10e3/uL 0.5   Absolute Eosinophils 0.0 - 0.7 10e3/uL 0.2   Absolute Basophils 0.0 - 0.2 10e3/uL 0.0   Absolute Immature Granulocytes <=0.4 10e3/uL 0.1   Absolute NRBCs 10e3/uL 0.0       ASSESSMENT/PLAN:   49 year old female with history of DVT and ER positive left breast cancer metastasized to bones.    1.  Metastatic breast cancer: On treatment with  abemaciclib and fulvestrant from 01/2024 - 02/2025.  PET/CT on 2/3/2024 with progression of bone metastases (left sacroiliac, T7 vertebrae, right 6th rib, right femoral head).     - Was on treatment with abemaciclib and fulvestrant.  Presented on 4/6/2025 with acute low back pain.  CT with bilateral pathologic fracture of pedicle of L4.    - On treatment with everolimus 10 mg daily and fulvestrant since 4/29/2025.  Instructed patient to hold everolimus one week prior to planned surgery (last dose on 6/30/2025).  It is okay to resume taking the medication one week after surgery.  - She is scheduled for next fulvestrant injection on 5/22/2025.  - We reviewed [place] [id number] liquid biopsy results which demonstrates ESR1 D538G and ESR1 Y537N mutations.  She is a candidate for next line treatment with elacestrant.  - Labs at time of fulvestrant injection on 5/22/2025.  - Labs, ALEKSANDRA visit, and fulvestrant around 6/19/2025  - Labs, visit with me, and fulvestrant around 7/21/2025.  PET/CT 1-2 business days prior to this visit.    2.  Bone metastases/cancer associated pain:  She is s/p XRT to the left ilium as well as the lumbar spine and kyphoplasty of L4--with some extravasation of cement which procedularist is aware of and recommended continued AC indefinitely. Received radiation to the left acetabulum and the SI, completed on 7/18/2024.    - 4/22/2025 PET was with disease progression in the left ilium, posterior T8, and right posterior 6th rib.  - she has bilateral pedicle fracture of L4.  Plan is for L3-L5 fusion with neurosurgery on 7/7/2025.  - continue to wear TLSO brace and avoid bending/twisting or lifting.   - She is taking belbuca, oxycodone, Robaxin, and gabapentin.  She has no showed her last two appointments with palliative.  She states this was due to oversleeping the day of the appointments.  I emphasized today the importance of regular follow up with palliative medicine.  - Teresa is out of prescriptions for  Belbuca and oxycodone.  She receives these through the palliative medicine clinic.  I messaged Dr. Bhupinder Frank.  She will refill the medications but emphasized that if Teresa misses her next follow up with palliative medicine, they will no longer fill these medications.    - Receiving Zometa once every 3 months. Next due early June.  Will hold off on this dose given planned lumbar spine fusion on 7/7    3.  Schizoaffective disorder, bipolar type: Zoloft 100 mg daily, seroquel 500 mg at bedtime with PRN hydroxyzine as well as seroquel 25-50 mg BID PRN for anxiety.     4.  FDG uptake stomach/dyspepsia: Had EGD done showing esophagitis and non bleeding gastric ulcers. She was positive for H. Pylori and completed initial treatment for this. Plan for stool antigen test one month after completion of PPI.    - advised to stop PPI at this time and will plan to repeat H. Pylori stool antigen with 6/19 labs.    5.  Stomatitis:  Pain in throat likely due to everolimus induced stomatitis.  - recommend swish and swallow of dexamethasone mouthwash.    Follow Up:  - Return visit with Dr. Tellez of palliative medicine within 1 month   - Labs at time of fulvestrant injection on 5/22/2025.  - Labs, ALEKSANDRA visit, and fulvestrant around 6/19/2025  - Labs, visit with me, and fulvestrant around 7/21/2025.  PET/CT 1-2 business days prior to this visit.    The longitudinal plan of care for the diagnosis(es)/condition(s) as documented were addressed during this visit. Due to the added complexity in care, I will continue to support Adam in the subsequent management and with ongoing continuity of care.    I spent 65 minutes on the date of the encounter doing chart review, review of test results, interpretation of tests, patient visit, documentation, and discussion with other provider(s)                 Again, thank you for allowing me to participate in the care of your patient.        Sincerely,        Jahaira Frost  MD Dimitrios    Electronically signed

## 2025-05-15 ENCOUNTER — VIRTUAL VISIT (OUTPATIENT)
Dept: PSYCHIATRY | Facility: CLINIC | Age: 50
End: 2025-05-15
Attending: PSYCHIATRY & NEUROLOGY
Payer: MEDICARE

## 2025-05-15 VITALS — BODY MASS INDEX: 35.01 KG/M2 | WEIGHT: 251 LBS

## 2025-05-15 DIAGNOSIS — F25.0 SCHIZOAFFECTIVE DISORDER, BIPOLAR TYPE (H): ICD-10-CM

## 2025-05-15 RX ORDER — OLANZAPINE 5 MG/1
TABLET, FILM COATED ORAL
Qty: 30 TABLET | Refills: 3 | Status: SHIPPED | OUTPATIENT
Start: 2025-05-15

## 2025-05-15 RX ORDER — QUETIAPINE FUMARATE 300 MG/1
300 TABLET, FILM COATED ORAL AT BEDTIME
Qty: 30 TABLET | Refills: 3 | Status: CANCELLED | OUTPATIENT
Start: 2025-05-15

## 2025-05-15 ASSESSMENT — PATIENT HEALTH QUESTIONNAIRE - PHQ9
10. IF YOU CHECKED OFF ANY PROBLEMS, HOW DIFFICULT HAVE THESE PROBLEMS MADE IT FOR YOU TO DO YOUR WORK, TAKE CARE OF THINGS AT HOME, OR GET ALONG WITH OTHER PEOPLE: SOMEWHAT DIFFICULT
SUM OF ALL RESPONSES TO PHQ QUESTIONS 1-9: 18
SUM OF ALL RESPONSES TO PHQ QUESTIONS 1-9: 18

## 2025-05-15 NOTE — NURSING NOTE
Current patient location: [address] [place] [id number]    Is the patient currently in the state of [place]? YES    Visit mode: VIDEO    If the visit is dropped, the patient can be reconnected by:VIDEO VISIT: Text to cell phone:   Telephone Information:   Mobile [phone]       Will anyone else be joining the visit? NO  (If patient encounters technical issues they should call [phone])    Are changes needed to the allergy or medication list? No    Are refills needed on medications prescribed by this physician? YES    Rooming Documentation:  Patient declined to complete questionnaire(s)    Reason for visit: RECHECK    Mary CUELLAR

## 2025-05-15 NOTE — PROGRESS NOTES
Virtual Visit Details    Type of service:  Video Visit   Video Start Time: 8:10AM  Video End Time:8:40AM    Originating Location (pt. Location): Home    Distant Location (provider location):  On-site  Platform used for Video Visit: Hub[employer]  Answers submitted by the patient for this visit:  Patient Health Questionnaire (Submitted on 5/15/2025)  If you checked off any problems, how difficult have these problems made it for you to do your work, take care of things at home, or get along with other people?: Somewhat difficult  PHQ9 TOTAL SCORE: 18

## 2025-05-15 NOTE — PROGRESS NOTES
Virtual Visit Details    Type of service:  Video Visit     Originating Location (pt. Location): Home    Distant Location (provider location):  On-site  Platform used for Video Visit: [facility]  MEDICAL PROGRESS NOTE     CARE TEAM:    PCP- Lavonne Frazier  Therapist- None   Oncology- Margot Plunkett MD   Palliative care: Ayanna Frank DO       Teresa is a 49 year old who uses the pronouns she, her.     Assessment     Schizoaffective disorder, bipolar type, most recent episode hypomanic       Anxiety associated with cancer diagnosis with panic features   Insomnia, unspecified   R/o PTSD    Metastatic breast cancer (dx 12/2020) s/p RT to lumbar spine and RFA /kyphoplasty L4   Bone metastases / low back pain with LLE sciatica        Teresa Sanderson is a 49 year old female with past psychiatric diagnosis of schizoaffective disorder, bipolar type. She was referred to psychiatry clinic by her oncologist following diagnosis of metastatic breast cancer in 12/2020. Daughter is her PCA.     Today, Teresa was seen after missed appointment and it was challenging for her to stay awake during the visit due to pain and daytime sleepiness from nighttime insomnia. Reviewed her medications and she noted some improvement of anxiety with hydroxyzine that was prescribed last visit. She identified being bothered by desire to independently perform ADLs and IADLs. Reflective listening provided and mindfulness practice was recommended. We reiterated our recommendations to establish with a therapist that she can see consistently. She agreed that she would benefit from that support, however, noted being overwhelmed with appointments. Will attempt to put psychotherapy referral again. Additionally, will coordinate with  on our team to clarify psychosocial support patient is receiving and whether additional support from us  would be helpful. We also directed her to support group for cancer patients. Regarding medication, Teresa finds hydroxyzine helpful and requested adjustment. Reviewed side effects  and she was advised to take the medication up to QID. Patient was informed of upcoming resident transition.      Psychotropic Drug Interactions:  [PSYCHCLINICDDI]  ADDITIVE SEROTONERGIC: quetiapine, sertraline  ADDITIVE SEDATION: oxycodone, robaxin, seroquel, olanzapine  Management: Routine Monitoring and patient is aware of risks.     [place]: taking controlled substances as prescribed    Risk Statements:   Treatment Risk- Risks, benefits, alternatives and potential adverse effects have been discussed and are understood.   Safety Risk-Teresa did not appear to be an imminent safety risk to self or others. We discussed safety plan and resources, and Teresa agreed to contact this clinic or seek emergency services if she experiences any safety concerns, worsened symptoms or medication side effects. Risk factors for self-harm include hallucinations and chronic medical issues.  Mitigating factors:commitment to family, good social support   and stable housing.  The patient does not appear to be at imminent risk for self-harm, hospitalization is not recommended which the pt does agree with. No hospitalization will be arranged. Based on degree of symptoms, close psych follow-up was/were recommended which the pt does  agree to. Additional steps to minimize risk: follow-up call/ MyChart in 1-2 weeks and frequent clinic visits.        Plan     1) Medications:  -Continue hydroxyzine 25mg QID PRN (takes twice a day, was informed she can take up to QID PRN)   -Continue olanzapine 5-10mg at bedtime for sleep and mood stabilization  (takes 5mg at bedtime PRN)   -Continue quetiapine 500mg at bedtime (patient takes 400-500mg at bedtime)   -Continue sertraline 100mg daily     Future considerations:   -motivational interview for  individual psychotherapy   -consider escitalopram, venlafaxine trial if sertraline is perceived to be  ineffective   -lamotrigine (if bipolar depression)   -retry lithium (if tom is more prominent)    -add mirtazapine 7.5mg at bedtime after mood stabilizer is optimized     Prescribed outside of psychiatry clinic:   - Ramelteon 8mg (discontinued by medicine on 12/24 due to ineffectiveness) Patient thought Ramelteon was Mirtazapine (Rameron).   - Gabapentin 600 mg TID   - Robaxin 500 mg TID PRN for muscle spasms  - Rivaroxaban 20mg daily  - Oxycodone 5-10mg Q4H PRN (takes 5mg 5 times daily)   - Abemaciclib 100mg BID    - Fulvestrant injection 500mg   - Vitamin D3 25mcg daily   - cyclobenzaprine 5mg TID   - Belbuca 300 Mcg Film q12h     2) Psychotherapy: recommended individual psychotherapy, peer support group, referral attempts made couple times and patient struggled to follow up     3) Next due:  Labs- routinely monitor atypical neuroleptic labs - labs up to date as of 9/11/2024 and 10/2024.   EKG- Routine monitoring is not indicated for current psychotropic medication regimen   Rating scales- AIMS: due at next in-person appointment    4) Referrals: referral to individual therapy was placed      5) Follow-up: Return to clinic in 3 months with PGY-3 psychiatry resident      Interim history      -went to bed at 2am and still tired   -couldn't make appointment yesterday but today she forced herself to get up   -didn't meet with a new therapist    -have been stretching or walking for 15 minutes after waking up   -hard for her to depend on others and don't want to rely on her children for help especially with ADLs, sometimes isolated herself   -mood depends on pain  -thinks anxiety has improved when she took hydroxyzine   -hurtful to not be able to do things she used to do like working, doing dishes, etc.   -listening to music has been calming for her   -appetite is okay        Recent Psych Symptoms:   Depression:   "depressed mood, low energy, insomnia, poor concentration /memory, excessive guilt, feeling hopeless, overwhelmed, and mood dysregulation  Elevated:  none   Psychosis:  denies auditory/ visual hallucinations     Anxiety:  feeling fearful, nervous/overwhelmed, and difficulty controlling her worrisome thoughts   Trauma Related:  Medical trauma/stressors, anxiety increased when thinking about medical procedures and results  Insomnia:  Yes: intermittent difficulty with both initiation and maintenance due to worries / racing thoughts mainly related to her health    Other:  No    Current Social History:  Financial/occupational: SSDI    Living situation (partner, children, pets, etc): Rents an apartment, lives with daughter and grandchildren, 1 cat (\"Toejoe\")  Social/spiritual support: best friend and sister in [place], daughter   Feels safe at home: Yes    Pertinent Substance Use:   Alcohol: denies recent use   Cannabis: denies recent use   Tobacco: denies   Caffeine:  Yes: occasional   Opioids: Yes: prescribed   Narcan Kit current: Yes  Other substances: none    Medical Review of Systems:   Lightheadedness/orthostasis: None  Headaches: Very rare tension headaches  GI: none denies constipation   Sexual health concerns: None    A comprehensive review of systems was performed and is negative other than noted above.        Mental Status Exam     Alertness: alert  and oriented  Appearance: casually groomed  Behavior/Demeanor: somewhat cooperative, distracted, with poor eye contact , closed her eyes and fell asleep mid conversation several times , woke easily with sound   Speech: normal  Language: intact and no problems  Psychomotor: slowed  Mood: anxious and worried  Affect: anxious; congruent to: mood- yes, content- yes  Thought Process/Associations: tangential  Thought Content:  Reports none;  Denies suicidal & violent ideation and delusions and paranoid ideation  Perception:  Reports none;  Denies auditory hallucinations, " visual hallucinations, and paranoia  Insight: fair acknowledges that she has unhelpful thinking that interfere with her ability to enjoy her daily life   Judgment: appropriate- taking medications as prescribed and can recall how she has been taking them , hasn't followed through with establishing care with therapist as agreed   Cognition: does  appear grossly intact; formal cognitive testing was not done  Gait and Station: N/A (telehealth)     Past Psych Med Trials      Medication Max Dose (mg) Dates / Duration Helpful? DC Reason / Adverse Effects?   Seroquel 400mg  yes Max dose of 500mg via 400mg QHS plus 50mg BID PRN   Zoloft 100mg Current  yes    gabapentin 600mg TID  ?    olanzapine 5mg BID 9064-3685  Y     Clozapine   Reported she was taking it while hospitalized in [place] ?      Depakote  Years  no Stopped working    lithium  Years ?until 2019  no Stopped working    Invega    Only brief trial   Ambien          Vitals   Wt 113.9 kg (251 lb)   BMI 35.01 kg/m    Pulse Readings from Last 3 Encounters:   05/13/25 61   05/09/25 71   04/24/25 71     Wt Readings from Last 3 Encounters:   05/15/25 113.9 kg (251 lb)   05/13/25 113.9 kg (251 lb)   04/24/25 113 kg (249 lb 1.6 oz)     BP Readings from Last 3 Encounters:   05/13/25 113/79   05/09/25 119/83   04/24/25 135/87        Medical History     ALLERGIES: Contrast dye    Patient Active Problem List   Diagnosis    Breast mass    Spine metastasis    Urinary tract infection with hematuria    Malignant neoplasm of overlapping sites of left breast in female, estrogen receptor positive (H)    Carcinoma of left breast metastatic to bone (H)    Metastasis to bone (H)    Pain of right lower leg    Malignant neoplasm of female breast, unspecified estrogen receptor status, unspecified laterality, unspecified site of breast (H)    Schizoaffective disorder, bipolar type (H)    Insomnia, unspecified type    Cancer associated pain    Malignant neoplasm metastatic to bone (H)     Uncontrolled pain    Carcinoma of breast metastatic to bone, unspecified laterality (H)    FELTON (acute kidney injury)        Medications     Current Outpatient Medications   Medication Sig Dispense Refill    Buprenorphine HCl (BELBUCA) 300 MCG FILM buccal film Place 1 Film (300 mcg) inside cheek every 12 hours. 28 Film 0    cyclobenzaprine (FLEXERIL) 5 MG tablet Take 1 tablet (5 mg) by mouth 3 times daily. 45 tablet 0    dexAMETHasone alcohol-free (DECADRON) 0.1 MG/ML solution Swish 10 mL in mouth for 2 min and spit, four times daily. 1200 mL 1    everolimus (AFINITOR) 10 MG tablet Take 1 tablet (10 mg) by mouth daily for 28 days Avoid grapefruit and grapefruit juice. Take with or without food, but should be consistent. 28 tablet 0    fulvestrant (FASLODEX) 250 MG/5ML injection Inject 500 mg into the muscle every 28 days.      gabapentin (NEURONTIN) 300 MG capsule Take 1 capsule (300 mg) by mouth 3 times daily. 90 capsule 0    hydrOXYzine HCl (ATARAX) 25 MG tablet Take 1 tablet (25 mg) by mouth 4 times daily as needed for anxiety or other (panic). 120 tablet 1    naloxone (NARCAN) 4 MG/0.1ML nasal spray Spray 1 spray (4 mg) into one nostril alternating nostrils as needed for opioid reversal. every 2-3 minutes until assistance arrives 2 each 0    OLANZapine (ZYPREXA) 5 MG tablet Take 1-2 tablets (5-10 mg) by mouth nightly as needed for sleep. Take 1/2 tablet (2.5mg) daily as needed for hypomania/ tom. 30 tablet 1    oxyCODONE IR (ROXICODONE) 10 MG tablet Take 1 tablet (10 mg) by mouth every 4 hours as needed for severe pain. 90 tablet 0    prochlorperazine (COMPAZINE) 10 MG tablet Take 1 tablet (10 mg) by mouth every 6 hours as needed for nausea or vomiting. 30 tablet 2    [START ON 6/12/2025] QUEtiapine (SEROQUEL) 100 MG tablet Tale 1 tablet (100mg) with 1 tablet (400mg) for total of 500mg by mouth at bedtime 30 tablet 2    QUEtiapine (SEROQUEL) 200 MG tablet Take 1 tablet (200 mg) by mouth at bedtime. 30 tablet 0     QUEtiapine (SEROQUEL) 300 MG tablet Take 1 tablet (300 mg) by mouth at bedtime. 30 tablet 0    [START ON 6/12/2025] QUEtiapine (SEROQUEL) 400 MG tablet Take 1 tablet (400mg) with 1 tablet (100mg) for total of 500mg by mouth at bedtime 30 tablet 2    QUEtiapine (SEROQUEL) 50 MG tablet Take 0.5-1 tablets (25-50 mg) by mouth 2 times daily as needed (for sleep, mood and anxiety). 60 tablet 2    rivaroxaban ANTICOAGULANT (XARELTO) 20 MG TABS tablet Take 1 tablet (20 mg) by mouth daily (with dinner). 90 tablet 3    sertraline (ZOLOFT) 100 MG tablet Take 1 tablet (100 mg) by mouth daily. 30 tablet 3    Vitamin D3 (CHOLECALCIFEROL) 25 mcg (1000 units) tablet Take 1 tablet (25 mcg) by mouth daily. 90 tablet 3        Labs and Data         12/8/2023     2:24 PM 6/21/2024     1:58 PM 1/6/2025     2:32 PM   PROMIS-10 Total Score w/o Sub Scores   PROMIS TOTAL - SUBSCORES 15 12 18        Proxy-reported          No data to display                  1/6/2025     2:27 PM 3/4/2025    10:42 AM 5/15/2025     8:04 AM   PHQ-9 SCORE   PHQ-9 Total Score MyChart 7 (Mild depression)  18 (Moderately severe depression)   PHQ-9 Total Score 7  11 18        Proxy-reported         10/29/2024    11:05 AM 1/6/2025     2:28 PM 3/4/2025    10:42 AM   JENNIFFER-7 SCORE   Total Score  18 (severe anxiety)    Total Score 16 18  17       Proxy-reported       Liver/Kidney Function, TSH Metabolic Blood counts   Recent Labs   Lab Test 04/24/25  1455 04/12/25  0753   AST 52*  --    *  --    ALKPHOS 124  --    CR 0.81 1.09*     Recent Labs   Lab Test 03/16/23  1650   TSH 0.80    Recent Labs   Lab Test 04/24/25  1455   CHOL 204*   TRIG 93   *   HDL 69     Recent Labs   Lab Test 03/04/25  1111   A1C 6.1*     Recent Labs   Lab Test 04/24/25  1455   *    Recent Labs   Lab Test 04/24/25  1455   WBC 4.2   HGB 9.8*   HCT 29.8*   *              ECG 10/19/23 QTc = 423ms    PROVIDER: Odette Peralta MD      Level of Medical Decision  Making:   - At least 1 chronic problem that is not stable  - Engaged in prescription drug management during visit (discussed any medication benefits, side effects, alternatives, etc.)       Psychiatry Individual Psychotherapy Note   Psychotherapy start time - 810  Psychotherapy end time - 840  Date treatment plan last reviewed with patient - 5/15/2025   Subjective: This supportive psychotherapy session addressed issues related to goals of therapy and current psychosocial stressors. Patient's reaction: Preparatory in the context of mental status appropriate for ambulatory setting.    Interactive complexity indicated? Yes, visit entailed Interactive Complexity evidenced by:  -The need to manage maladaptive communication (related to, e.g., high anxiety, high reactivity, repeated questions, or disagreement) among participants that complicates delivery of care  Plan: RTC in timeframe noted above  Psychotherapy services during this visit included myself and the patient.   Treatment Plan      SYMPTOMS; PROBLEMS   MEASURABLE GOALS;    FUNCTIONAL IMPROVEMENT / GAINS INTERVENTIONS DISCHARGE CRITERIA   Depression: depressed mood, low energy, feeling hopelesss, and excessive crying  Anxiety: excessive worry, feeling fearful, and nervous/overwhelmed  Trauma Related: intrusive memories, trauma trigger psychological / physiological response, and mood dysregulation  Soraya/Hypomania: increased energy, decreased sleep need, increased activity, racing thoughts, and talkativeness  Psychosis: auditory hallucinations  Dysregulation: mood dysregulation and irritable   reduce depressive symptoms, find enjoyment at least once a day, learn best practices for sleep, reduce panic attacks/ excessive worry, make a plan to manage 2-3 anxiety-provoking situations, reduce feeling overwhelmed/ improve decision making skills, develop 2 strategies to cope with trauma triggers/intrusive memories, reduce manic/hypomanic episodes, identify coping  strategies for AH, and learn 2 new ways of coping with routine stressors Supportive marked symptom improvement, reduced visit frequency, significant improvement in self-reports for anxiety, panic attacks, psychosis, hypomania, and depression consecutive visits, and can transfer back to primary care     Patient staffed in clinic with Dr. Nails who will sign the note.  Supervisor is Dr. Johnson.

## 2025-05-15 NOTE — PATIENT INSTRUCTIONS
**For crisis resources, please see the information at the end of this document**   Patient Education    Thank you for coming to the [facility] [place] [facility].     It was good seeing you today, Teresa   -Please follow up with establishing care with a therapist    -[email or web address] is the support group for cancer patients I mentioned that you may find helpful        Lab Testing:  If you had lab testing today and your results are reassuring or normal they will be mailed to you or sent through [employer] within 7 days. If the lab tests need quick action we will call you with the results. The phone number we will call with results is # [phone]. If this is not the best number please call our clinic and change the number.     Medication Refills:  If you need any refills please call your pharmacy and they will contact us. Our fax number for refills is [phone].   Three business days of notice are needed for general medication refill requests.   Five business days of notice are needed for controlled substance refill requests.   If you need to change to a different pharmacy, please contact the new pharmacy directly. The new pharmacy will help you get your medications transferred.     Contact Us:  Please call [phone] during business hours (8-5:00 M-F).   If you have medication related questions after clinic hours, or on the weekend, please call [phone].     Financial Assistance [phone]   Medical Records [phone]       MENTAL HEALTH CRISIS RESOURCES:  For a emergency help, please call 911 or go to the nearest Emergency Department.     Emergency Walk-In Options:   EmPATH Unit @ [facility] ([place]): [phone] - Specialized mental health emergency area designed to be calming  [facility] ([place]): [phone]  [facility] ([place]): [phone]  [facility]):  [phone]    [facility] Crisis Information:   [place]: [phone]  [place]: [phone]  [place] ([place]) - Adult: [phone]     Child: [phone]  [place] - Adult: [phone]     Child: [phone]  [place]: [phone]  List of all [facility] resources:   https://[place].gov/dhs/people-we-serve/adults/health-care/mental-health/resources/crisis-contacts.jsp    National Crisis Information:   Crisis Text Line: Text  [place]  to 342303  Suicide & Crisis Lifeline: 988  National Suicide Prevention Lifeline: 2-982-507-TALK (1-765.741.4197)       For online chat options, visit https://suicidepreventionlifeline.org/chat/  Poison Control Center: 1-604.893.5228  Trans Lifeline: 1-294.378.7521 - Hotline for transgender people of all ages  [employer]: 7-427-031-7448 - Hotline for LGBT youth     For Non-Emergency Support:   Fast Tracker: Mental Health & Substance Use Disorder Resources -   https://www.[employer].org/

## 2025-05-17 DIAGNOSIS — C50.812 MALIGNANT NEOPLASM OF OVERLAPPING SITES OF LEFT BREAST IN FEMALE, ESTROGEN RECEPTOR POSITIVE (H): ICD-10-CM

## 2025-05-17 DIAGNOSIS — C50.912 CARCINOMA OF LEFT BREAST METASTATIC TO BONE (H): Primary | ICD-10-CM

## 2025-05-17 DIAGNOSIS — Z17.0 MALIGNANT NEOPLASM OF OVERLAPPING SITES OF LEFT BREAST IN FEMALE, ESTROGEN RECEPTOR POSITIVE (H): ICD-10-CM

## 2025-05-17 DIAGNOSIS — C79.51 CARCINOMA OF LEFT BREAST METASTATIC TO BONE (H): Primary | ICD-10-CM

## 2025-05-17 RX ORDER — DIPHENHYDRAMINE HYDROCHLORIDE 50 MG/ML
50 INJECTION, SOLUTION INTRAMUSCULAR; INTRAVENOUS
Start: 2025-05-22

## 2025-05-17 RX ORDER — MEPERIDINE HYDROCHLORIDE 25 MG/ML
25 INJECTION INTRAMUSCULAR; INTRAVENOUS; SUBCUTANEOUS
OUTPATIENT
Start: 2025-05-22

## 2025-05-17 RX ORDER — ALBUTEROL SULFATE 0.83 MG/ML
2.5 SOLUTION RESPIRATORY (INHALATION)
OUTPATIENT
Start: 2025-05-22

## 2025-05-17 RX ORDER — ALBUTEROL SULFATE 90 UG/1
1-2 INHALANT RESPIRATORY (INHALATION)
Start: 2025-05-22

## 2025-05-17 RX ORDER — METHYLPREDNISOLONE SODIUM SUCCINATE 40 MG/ML
40 INJECTION INTRAMUSCULAR; INTRAVENOUS
Start: 2025-05-22

## 2025-05-17 RX ORDER — LAMOTRIGINE 25 MG/1
500 TABLET ORAL ONCE
Status: CANCELLED | OUTPATIENT
Start: 2025-05-22 | End: 2025-05-22

## 2025-05-17 RX ORDER — EPINEPHRINE 1 MG/ML
0.3 INJECTION, SOLUTION INTRAMUSCULAR; SUBCUTANEOUS EVERY 5 MIN PRN
OUTPATIENT
Start: 2025-05-22

## 2025-05-17 RX ORDER — DIPHENHYDRAMINE HYDROCHLORIDE 50 MG/ML
25 INJECTION, SOLUTION INTRAMUSCULAR; INTRAVENOUS
Start: 2025-05-22

## 2025-05-19 DIAGNOSIS — C50.912 CARCINOMA OF LEFT BREAST METASTATIC TO BONE (H): Primary | ICD-10-CM

## 2025-05-19 DIAGNOSIS — C79.51 CARCINOMA OF LEFT BREAST METASTATIC TO BONE (H): Primary | ICD-10-CM

## 2025-05-19 DIAGNOSIS — C50.812 MALIGNANT NEOPLASM OF OVERLAPPING SITES OF LEFT BREAST IN FEMALE, ESTROGEN RECEPTOR POSITIVE (H): ICD-10-CM

## 2025-05-19 DIAGNOSIS — Z17.0 MALIGNANT NEOPLASM OF OVERLAPPING SITES OF LEFT BREAST IN FEMALE, ESTROGEN RECEPTOR POSITIVE (H): ICD-10-CM

## 2025-05-19 RX ORDER — EVEROLIMUS 10 MG/1
10 TABLET ORAL DAILY
Qty: 28 TABLET | Refills: 0 | Status: SHIPPED | OUTPATIENT
Start: 2025-05-22 | End: 2025-06-19

## 2025-05-22 ENCOUNTER — INFUSION THERAPY VISIT (OUTPATIENT)
Dept: ONCOLOGY | Facility: CLINIC | Age: 50
End: 2025-05-22
Attending: INTERNAL MEDICINE
Payer: MEDICARE

## 2025-05-22 ENCOUNTER — APPOINTMENT (OUTPATIENT)
Dept: LAB | Facility: CLINIC | Age: 50
End: 2025-05-22
Attending: INTERNAL MEDICINE
Payer: MEDICARE

## 2025-05-22 ENCOUNTER — TELEPHONE (OUTPATIENT)
Dept: ONCOLOGY | Facility: CLINIC | Age: 50
End: 2025-05-22

## 2025-05-22 VITALS
SYSTOLIC BLOOD PRESSURE: 131 MMHG | DIASTOLIC BLOOD PRESSURE: 84 MMHG | HEART RATE: 83 BPM | WEIGHT: 243 LBS | OXYGEN SATURATION: 96 % | BODY MASS INDEX: 33.89 KG/M2 | RESPIRATION RATE: 16 BRPM | TEMPERATURE: 98 F

## 2025-05-22 DIAGNOSIS — Z17.0 MALIGNANT NEOPLASM OF OVERLAPPING SITES OF LEFT BREAST IN FEMALE, ESTROGEN RECEPTOR POSITIVE (H): Primary | ICD-10-CM

## 2025-05-22 DIAGNOSIS — C50.812 MALIGNANT NEOPLASM OF OVERLAPPING SITES OF LEFT BREAST IN FEMALE, ESTROGEN RECEPTOR POSITIVE (H): Primary | ICD-10-CM

## 2025-05-22 DIAGNOSIS — C79.51 CARCINOMA OF LEFT BREAST METASTATIC TO BONE (H): ICD-10-CM

## 2025-05-22 DIAGNOSIS — C50.912 CARCINOMA OF LEFT BREAST METASTATIC TO BONE (H): ICD-10-CM

## 2025-05-22 LAB
ALBUMIN SERPL BCG-MCNC: 4.1 G/DL (ref 3.5–5.2)
ALP SERPL-CCNC: 143 U/L (ref 40–150)
ALT SERPL W P-5'-P-CCNC: 61 U/L (ref 0–50)
ANION GAP SERPL CALCULATED.3IONS-SCNC: 12 MMOL/L (ref 7–15)
AST SERPL W P-5'-P-CCNC: 42 U/L (ref 0–45)
BASOPHILS # BLD AUTO: 0 10E3/UL (ref 0–0.2)
BASOPHILS NFR BLD AUTO: 1 %
BILIRUB SERPL-MCNC: 0.3 MG/DL
BUN SERPL-MCNC: 6.4 MG/DL (ref 6–20)
CALCIUM SERPL-MCNC: 9.3 MG/DL (ref 8.8–10.4)
CANCER AG15-3 SERPL-ACNC: 55 U/ML
CEA SERPL-MCNC: 4.8 NG/ML
CHLORIDE SERPL-SCNC: 106 MMOL/L (ref 98–107)
CREAT SERPL-MCNC: 0.82 MG/DL (ref 0.51–0.95)
EGFRCR SERPLBLD CKD-EPI 2021: 87 ML/MIN/1.73M2
EOSINOPHIL # BLD AUTO: 0.2 10E3/UL (ref 0–0.7)
EOSINOPHIL NFR BLD AUTO: 6 %
ERYTHROCYTE [DISTWIDTH] IN BLOOD BY AUTOMATED COUNT: 13.1 % (ref 10–15)
GLUCOSE SERPL-MCNC: 146 MG/DL (ref 70–99)
HCO3 SERPL-SCNC: 23 MMOL/L (ref 22–29)
HCT VFR BLD AUTO: 33.2 % (ref 35–47)
HGB BLD-MCNC: 11 G/DL (ref 11.7–15.7)
IMM GRANULOCYTES # BLD: 0 10E3/UL
IMM GRANULOCYTES NFR BLD: 0 %
LYMPHOCYTES # BLD AUTO: 1 10E3/UL (ref 0.8–5.3)
LYMPHOCYTES NFR BLD AUTO: 35 %
MCH RBC QN AUTO: 31.2 PG (ref 26.5–33)
MCHC RBC AUTO-ENTMCNC: 33.1 G/DL (ref 31.5–36.5)
MCV RBC AUTO: 94 FL (ref 78–100)
MONOCYTES # BLD AUTO: 0.3 10E3/UL (ref 0–1.3)
MONOCYTES NFR BLD AUTO: 10 %
NEUTROPHILS # BLD AUTO: 1.4 10E3/UL (ref 1.6–8.3)
NEUTROPHILS NFR BLD AUTO: 47 %
NRBC # BLD AUTO: 0 10E3/UL
NRBC BLD AUTO-RTO: 0 /100
PLATELET # BLD AUTO: 189 10E3/UL (ref 150–450)
POTASSIUM SERPL-SCNC: 3.5 MMOL/L (ref 3.4–5.3)
PROT SERPL-MCNC: 7.7 G/DL (ref 6.4–8.3)
RBC # BLD AUTO: 3.53 10E6/UL (ref 3.8–5.2)
SODIUM SERPL-SCNC: 141 MMOL/L (ref 135–145)
WBC # BLD AUTO: 2.9 10E3/UL (ref 4–11)

## 2025-05-22 PROCEDURE — 36415 COLL VENOUS BLD VENIPUNCTURE: CPT

## 2025-05-22 PROCEDURE — 82378 CARCINOEMBRYONIC ANTIGEN: CPT

## 2025-05-22 PROCEDURE — 86300 IMMUNOASSAY TUMOR CA 15-3: CPT

## 2025-05-22 PROCEDURE — 85004 AUTOMATED DIFF WBC COUNT: CPT

## 2025-05-22 PROCEDURE — 82310 ASSAY OF CALCIUM: CPT

## 2025-05-22 PROCEDURE — 250N000011 HC RX IP 250 OP 636: Mod: JZ | Performed by: INTERNAL MEDICINE

## 2025-05-22 RX ORDER — LAMOTRIGINE 25 MG/1
500 TABLET ORAL ONCE
Status: COMPLETED | OUTPATIENT
Start: 2025-05-22 | End: 2025-05-22

## 2025-05-22 RX ADMIN — FULVESTRANT 500 MG: 50 INJECTION INTRAMUSCULAR at 14:09

## 2025-05-22 ASSESSMENT — PAIN SCALES - GENERAL: PAINLEVEL_OUTOF10: MODERATE PAIN (6)

## 2025-05-22 NOTE — NURSING NOTE
Chief Complaint   Patient presents with    Blood Draw     Venipuncture, vitals checked     Karma Martinez RN on 5/22/2025 at 1:56 PM    
No

## 2025-05-22 NOTE — ORAL ONC MGMT
Oral Chemotherapy Monitoring Program  Lab Follow Up    Patient is on everolimus (Afinitor)  Reviewed lab results from 5/22/25.    Assessment & Plan:  CBC and CMP reviewed. Results show elevated ALT, but decreased from last check.  Plan to continue Afinitor as prescribed. Patient not contacted with lab results by OC team, as pt was seen in infusion suite    Follow-Up:  6/19: labs and Alee G visit      Pearl Garcia, GabyD  Hematology/Oncology Clinical Pharmacist  Oral Chemotherapy Monitoring Program  [facility]  [phone]  May 22, 2025            4/18/2025     9:00 AM 4/22/2025    11:00 AM 4/24/2025     4:00 PM 5/2/2025     9:00 AM 5/6/2025     9:00 AM 5/19/2025     2:00 PM 5/22/2025     2:00 PM   ORAL CHEMOTHERAPY   Assessment Type Left Voicemail Left Voicemail New Teach Left Voicemail Initial Follow up Refill Lab Monitoring   Diagnosis Code Breast Cancer Breast Cancer Breast Cancer Breast Cancer Breast Cancer Breast Cancer Breast Cancer   Providers Dr. Dimitrios Plunkett   [facility]   Drug Name Afinitor (everolimus) Afinitor (everolimus) Afinitor (everolimus) Afinitor (everolimus) Afinitor (everolimus) Afinitor (everolimus) Afinitor (everolimus)   Dose 10 mg 10 mg 10 mg 10 mg 10 mg 10 mg 10 mg   Current Schedule Daily Daily Daily Daily Daily Daily Daily   Cycle Details Continuous Continuous Continuous Continuous Continuous Continuous Continuous   Start Date of Last Cycle     4/29/2025 4/29/2025   Planned next cycle start date       5/27/2025   Doses missed in last 2 weeks     0     Adverse Effects       Increased AST/ALT/T BILI   Increased AST/ALT/T BILI       Grade 1   Pharmacist Intervention(increased ast/alt/t bili)       No   Is the dose as ordered appropriate for the patient?       Yes       Labs:  _  Result Component Current Result Ref Range   Sodium 141  (5/22/2025) 135 - 145 mmol/L     _  Result Component Current Result Ref Range   Potassium 3.5 (5/22/2025) 3.4 - 5.3 mmol/L     _  Result Component Current Result Ref Range   Calcium 9.3 (5/22/2025) 8.8 - 10.4 mg/dL     No results found for Mag within last 30 days.     No results found for Phos within last 30 days.     _  Result Component Current Result Ref Range   Albumin 4.1 (5/22/2025) 3.5 - 5.2 g/dL     _  Result Component Current Result Ref Range   Urea Nitrogen 6.4 (5/22/2025) 6.0 - 20.0 mg/dL     _  Result Component Current Result Ref Range   Creatinine 0.82 (5/22/2025) 0.51 - 0.95 mg/dL     _  Result Component Current Result Ref Range   AST 42 (5/22/2025) 0 - 45 U/L     _  Result Component Current Result Ref Range   ALT 61 (H) (5/22/2025) 0 - 50 U/L     _  Result Component Current Result Ref Range   Bilirubin Total 0.3 (5/22/2025) <=1.2 mg/dL     _  Result Component Current Result Ref Range   WBC Count 2.9 (L) (5/22/2025) 4.0 - 11.0 10e3/uL     _  Result Component Current Result Ref Range   Hemoglobin 11.0 (L) (5/22/2025) 11.7 - 15.7 g/dL     _  Result Component Current Result Ref Range   Platelet Count 189 (5/22/2025) 150 - 450 10e3/uL     _  Result Component Current Result Ref Range   Absolute Neutrophils 1.4 (L) (5/22/2025) 1.6 - 8.3 10e3/uL

## 2025-05-22 NOTE — PROGRESS NOTES
Nursing Note:    Teresa Sanderson presents today for Fulvestrant.   Patient seen by provider today: No   present during visit today: Not Applicable.     Note: Adam arrives to infusion feeling okay. She reports no new concerns or questions.     Treatment Conditions:  Not Applicable.  I released the medication and delegated administration to the supportive staff team member. Please see MAR and administration note for additional details.     Rayna Slater RN

## 2025-05-22 NOTE — PROGRESS NOTES
Infusion Injection Note:  Teresa Kin presents today for Faslodex Injection (s).    Patient seen by provider today: No   present during visit today: Not Applicable.    Patient requested that the faslodex injections be given at the same time. Pt decliend having any questions for an RN or needing any prescriptions refilled. Rayna Slater RN assisted with the injections. Rayna administered 250mg of fulvestrant to the left ventrogluteal without incident. BETHANY administered 250mg of fulvestrant to the right ventrogluteal without incident.     Treatment Conditions:  Not Applicable.    Pre and Post Injection:  Faslodex injection given to the bilateral ventrogluteals without incident.     Patient tolerated procedure well..    Discharge Plan:  Patient discharged in stable condition accompanied by: self

## 2025-05-27 ENCOUNTER — OFFICE VISIT (OUTPATIENT)
Dept: INTERNAL MEDICINE | Facility: CLINIC | Age: 50
End: 2025-05-27
Payer: MEDICARE

## 2025-05-27 VITALS
SYSTOLIC BLOOD PRESSURE: 127 MMHG | BODY MASS INDEX: 33.8 KG/M2 | HEIGHT: 71 IN | RESPIRATION RATE: 16 BRPM | WEIGHT: 241.4 LBS | HEART RATE: 83 BPM | TEMPERATURE: 97.9 F | DIASTOLIC BLOOD PRESSURE: 86 MMHG | OXYGEN SATURATION: 98 %

## 2025-05-27 DIAGNOSIS — I82.432 ACUTE DEEP VEIN THROMBOSIS (DVT) OF POPLITEAL VEIN OF LEFT LOWER EXTREMITY (H): Primary | ICD-10-CM

## 2025-05-27 DIAGNOSIS — G89.3 CANCER ASSOCIATED PAIN: ICD-10-CM

## 2025-05-27 DIAGNOSIS — F25.0 SCHIZOAFFECTIVE DISORDER, BIPOLAR TYPE (H): ICD-10-CM

## 2025-05-27 DIAGNOSIS — C79.51 MALIGNANT NEOPLASM METASTATIC TO BONE (H): ICD-10-CM

## 2025-05-27 DIAGNOSIS — C79.51 METASTASIS TO SPINAL COLUMN (H): ICD-10-CM

## 2025-05-27 PROCEDURE — 3079F DIAST BP 80-89 MM HG: CPT

## 2025-05-27 PROCEDURE — 99213 OFFICE O/P EST LOW 20 MIN: CPT | Mod: GE

## 2025-05-27 PROCEDURE — 3074F SYST BP LT 130 MM HG: CPT

## 2025-05-27 RX ORDER — QUETIAPINE FUMARATE 50 MG/1
25-50 TABLET, FILM COATED ORAL 2 TIMES DAILY PRN
Qty: 60 TABLET | Refills: 2 | Status: SHIPPED | OUTPATIENT
Start: 2025-05-27

## 2025-05-27 RX ORDER — HYDROXYZINE HYDROCHLORIDE 25 MG/1
25 TABLET, FILM COATED ORAL 4 TIMES DAILY PRN
Qty: 120 TABLET | Refills: 1 | Status: SHIPPED | OUTPATIENT
Start: 2025-05-27

## 2025-05-27 RX ORDER — QUETIAPINE FUMARATE 300 MG/1
300 TABLET, FILM COATED ORAL AT BEDTIME
Qty: 30 TABLET | Refills: 0 | Status: SHIPPED | OUTPATIENT
Start: 2025-05-27

## 2025-05-27 RX ORDER — GABAPENTIN 300 MG/1
300 CAPSULE ORAL 3 TIMES DAILY
Qty: 90 CAPSULE | Refills: 0 | Status: SHIPPED | OUTPATIENT
Start: 2025-05-27

## 2025-05-27 RX ORDER — QUETIAPINE FUMARATE 200 MG/1
200 TABLET, FILM COATED ORAL AT BEDTIME
Qty: 30 TABLET | Refills: 0 | Status: SHIPPED | OUTPATIENT
Start: 2025-05-27

## 2025-05-27 NOTE — PATIENT INSTRUCTIONS
Nikolas Moore,     It was a pleasure to see you again today.     I am glad your pain is improved slightly, and I do think that this surgery coming up will be good for you. I also think that going to [facility] will be good for you.     -- As discussed today, you can take 25mg of Hydroxyzine four time a day OR 50mg of hydroxyzine twice a day as needed for sleep and anxiety. I recommend trying 50mg at night instead of the 25mg to see if it helps with sleeping.     -- It is very important that you go to your appointment on 6/12 with the pharmacist through psychiatry at [facility] to continue to work on your medications.     -- Please work on re-scheduling with palliative care. It is very important that you make the appointment that you schedule with them.     Please let me know if you have other questions or concerns.     Dr. Frazier

## 2025-05-27 NOTE — PROGRESS NOTES
Assessment & Plan   Teresa Sanderson is a 48 yo female with known metastatic breast cancer, currently seen by onc and palliative for mgmt, coming in for her annual visit in addition to recheck on mental health, sleeping, medications, and pain. Is doing less well than prior today, tired due to more difficulty with her sleeping schedule and her pain is also making things harder for her. Endorsing significant anxiety related to upcoming surgery. Plan as below.     (I82.432) Acute deep vein thrombosis (DVT) of popliteal vein of left lower extremity (H)  (primary encounter diagnosis)  Comment: Refilled.   Plan: rivaroxaban ANTICOAGULANT (XARELTO) 20 MG TABS         tablet    (C79.51) Malignant neoplasm metastatic to bone (H)  (C79.51) Spine metastasis  (G89.3) Cancer associated pain  (F25.0) Schizoaffective disorder, bipolar type (H)  Comment: Pain seems better managed with Belbuca patch on board. Reviewed recent hospitalization and current plan of care. Pt has missed several appointments with palliative unfortunately due to her trouble sleeping and therefore sleeping in throughout the day. She understands the importance of following up with them but has been unable to get out of bed to get to her appointments in time as her sleeping has worsened again. Refilled medications as below today. Discussed with her increasing PM hydroxyzine dosing to see if this assists with her anxiety and trouble sleeping from 25mg to 50mg. She will try this. Could be a good candidate for Remeron, but per psych notes wanting to bring mood stabilizer to appropriate dosing before adding on. Could also consider CBT-I with psych if continuing to have trouble sleeping.   Has follow up with psychiatry pharmacist 6/12 and emphasized importance of this appointment. Pt understanding.   Plan: gabapentin (NEURONTIN) 300 MG capsule  Plan: QUEtiapine (SEROQUEL) 300 MG tablet, QUEtiapine        (SEROQUEL) 200 MG tablet, QUEtiapine (SEROQUEL)        50  "MG tablet, hydrOXYzine HCl (ATARAX) 25 MG         tablet     Follow-up in 4 months scheduled or sooner prn.     Lavonne Frazier MD  Internal Medicine Resident, PGY1  [facility]      Trung Medina is a 49 year old, presenting for the following health issues:  Follow Up (Pt here to follow up; pt reports trouble falling asleep)      5/27/2025    12:47 PM   Additional Questions   Roomed by Sara GREEN   Accompanied by Daughter     History of Present Illness       Reason for visit:  Follow up She is missing 1 dose(s) of medications per week.  She is not taking prescribed medications regularly due to remembering to take.        She is taking most of her medications but intermittently forgetting some of them. Notes that she does need refills on many medications today. Currently taking 500mg Seroquel every night with an extra 100mg, denies taking more than that. Additionally taking 10mg Zyprexa every night, 25mg hydroxyzine. Is still not sleeping well, most nights not falling asleep until about [id number] the next morning. This is hindering her ability to make all of her medical appointments on time, but she knows that it is very important for her to continue to see psychiatry, palliative care, and oncology when she is scheduled to do so.     Her pain is better managed with the Belbuca patch, though still 4-5/10 routinely. Is hopeful that her upcoming surgery helps with her pain.     Daughter with pt today. She does not have MyChart access for pt's medications, appts. Is interested in getting this but does not have time today to stay for sign up.     Requesting refills on several medications.         Objective    /86 (BP Location: Right arm, Patient Position: Sitting, Cuff Size: Adult Large)   Pulse 83   Temp 97.9  F (36.6  C) (Oral)   Resp 16   Ht 1.803 m (5' 11\")   Wt 109.5 kg (241 lb 6.4 oz)   SpO2 98%   BMI 33.67 kg/m    Body mass index is 33.67 kg/m .  Physical Exam     Constitutional: " Awake, alert. NAD. Non-toxic appearing. Daughter with pt today, both women quiet.    HEENT: EOMI, MMM. NC/AT.     CV: RRR, nl S1/S2. No m/g/r. No JVD. No pretibial edema.     Resp: BSCA bilaterally. Comfortable on RA. No wheezes, rhonchi, rales.     GI: BS+. ND, NTTP. No HSM. No masses. No r/g.     MSK: Warm, well perfused. TTP along L spine. Gait antalgic.     Skin: Dry, warm. No jaundice. No pallor.     Neuro: No focal deficits.     Psych: Normal mood and quiet affect, though overall more normal compared to flat or anxious. Able to exhibit appropriate insight into her disease and the cares she is receiving.           Signed Electronically by: Lavonne Frazier MD

## 2025-06-10 ENCOUNTER — PATIENT OUTREACH (OUTPATIENT)
Dept: CARE COORDINATION | Facility: CLINIC | Age: 50
End: 2025-06-10
Payer: MEDICARE

## 2025-06-10 NOTE — PROGRESS NOTES
Social Work - Telephone/[employer] [employer] message  [facility]  Data:   Patient Name: Teresa Sanderson  Goes By: Adam       /Age: 1975 (49 year old)    Intervention: SW received message from [employer] that they have been trying to reach pt regarding her gera but have been unsuccessful. SW called Teresa and left voicemail encouraging her to call [facility] so she can receive her gera. Encouraged her to call back with any questions.   Plan:  will await patient's return phone call/message and provide assistance at that time.   DANA De Paz, [facility]  Adult Oncology Clinics  [place] (M,W), [place] (T) & [place] (Th)  *I am off Friday  Office: [phone]

## 2025-06-12 ENCOUNTER — OFFICE VISIT (OUTPATIENT)
Dept: PHARMACY | Facility: CLINIC | Age: 50
End: 2025-06-12
Payer: MEDICARE

## 2025-06-12 DIAGNOSIS — G47.00 INSOMNIA, UNSPECIFIED TYPE: Primary | ICD-10-CM

## 2025-06-12 DIAGNOSIS — F25.0 SCHIZOAFFECTIVE DISORDER, BIPOLAR TYPE (H): ICD-10-CM

## 2025-06-12 DIAGNOSIS — F41.1 GAD (GENERALIZED ANXIETY DISORDER): ICD-10-CM

## 2025-06-12 RX ORDER — QUETIAPINE FUMARATE 100 MG/1
TABLET, FILM COATED ORAL
Qty: 30 TABLET | Refills: 2 | Status: SHIPPED | OUTPATIENT
Start: 2025-06-12

## 2025-06-12 RX ORDER — HYDROXYZINE HYDROCHLORIDE 25 MG/1
25 TABLET, FILM COATED ORAL 4 TIMES DAILY PRN
Qty: 120 TABLET | Refills: 2 | Status: SHIPPED | OUTPATIENT
Start: 2025-06-12

## 2025-06-12 RX ORDER — QUETIAPINE FUMARATE 50 MG/1
25-50 TABLET, FILM COATED ORAL 2 TIMES DAILY PRN
Qty: 60 TABLET | Refills: 2 | Status: SHIPPED | OUTPATIENT
Start: 2025-06-12

## 2025-06-12 RX ORDER — OLANZAPINE 5 MG/1
TABLET, FILM COATED ORAL
Qty: 30 TABLET | Refills: 2 | Status: SHIPPED | OUTPATIENT
Start: 2025-06-12

## 2025-06-12 RX ORDER — QUETIAPINE FUMARATE 400 MG/1
TABLET, FILM COATED ORAL
Qty: 30 TABLET | Refills: 2 | Status: SHIPPED | OUTPATIENT
Start: 2025-06-12

## 2025-06-12 NOTE — PROGRESS NOTES
Disease State Management Encounter:                          Adam Sanderson is a 49 year old female seen for an initial visit. She was referred to me from psychiatry.     Reason for visit: bridge visit. Per patient - needs medication refills.       Mental Health   Schizoaffective Disorder, bipolar type  Anxiety, Insomnia  Seroquel 500mg nightly + 25-50mg twice daily as needed   Olanzapine 5-10mg nightly as needed   Hydroxyzine 25mg four times daily as needed   Sertraline 100mg daily    Today, patient reports:   - ran out of Seroquel 2 days ago; appears prescriptions sent to mailorder pharmacy today  - newly established with [facility], but hasn't gotten a delivery from them yet  - trouble sleeping and worsening anxiety without Seroquel or olanzapine - didn't sleep at all last night  - upcoming surgery which is a stressor  - appears hydroxyzine and Seroquel was last filled 4/14/25, olanzapine 2/7/25         Assessment/Plan:    Called [facility] mailorder pharmacy while in visit with patient in visit.  Spoke with staff - unable to deliver to patient today. Prescriptions to be filled at [facility] today instead. Writer walked patient to pharmacy to  Seroquel, olanzapine, hydroxyzine. Helped patient program [facility] mailorder pharmacy phone number into her phone - instructed her to call pharmacy when she has about 1 week of medication left to request mailout. Patient expressed understanding.    Follow-up: psychiatry MTM check in 2 weeks    I spent 45 minutes with this patient today. All changes were made via collaborative practice agreement with Dr. Nails (refills sent).     A summary of these recommendations was sent via [employer].    Lianne Tuttle, PharmD, BCPP  Medication Therapy Management Pharmacist  [facility]           Medication Therapy Recommendations  Schizoaffective disorder, bipolar type (H)   1 Current Medication: QUEtiapine (SEROQUEL) 400 MG tablet    Current Medication Sig: Take 1 tablet (400mg) with 1 tablet (100mg) for total of 500mg by mouth at bedtime   Rationale: Medication product not available - Adherence - Adherence   Recommendation: Provide Adherence Intervention   Status: Patient Agreed - Adherence/Education   Identified Date: 6/12/2025 Completed Date: 6/12/2025

## 2025-06-14 DIAGNOSIS — C50.812 MALIGNANT NEOPLASM OF OVERLAPPING SITES OF LEFT BREAST IN FEMALE, ESTROGEN RECEPTOR POSITIVE (H): ICD-10-CM

## 2025-06-14 DIAGNOSIS — Z17.0 MALIGNANT NEOPLASM OF OVERLAPPING SITES OF LEFT BREAST IN FEMALE, ESTROGEN RECEPTOR POSITIVE (H): ICD-10-CM

## 2025-06-14 DIAGNOSIS — C50.912 CARCINOMA OF LEFT BREAST METASTATIC TO BONE (H): Primary | ICD-10-CM

## 2025-06-14 DIAGNOSIS — C79.51 CARCINOMA OF LEFT BREAST METASTATIC TO BONE (H): Primary | ICD-10-CM

## 2025-06-14 RX ORDER — DIPHENHYDRAMINE HYDROCHLORIDE 50 MG/ML
50 INJECTION, SOLUTION INTRAMUSCULAR; INTRAVENOUS
Start: 2025-06-19

## 2025-06-14 RX ORDER — LAMOTRIGINE 25 MG/1
500 TABLET ORAL ONCE
Status: CANCELLED | OUTPATIENT
Start: 2025-06-19 | End: 2025-06-19

## 2025-06-14 RX ORDER — EPINEPHRINE 1 MG/ML
0.3 INJECTION, SOLUTION INTRAMUSCULAR; SUBCUTANEOUS EVERY 5 MIN PRN
OUTPATIENT
Start: 2025-06-19

## 2025-06-14 RX ORDER — ALBUTEROL SULFATE 0.83 MG/ML
2.5 SOLUTION RESPIRATORY (INHALATION)
OUTPATIENT
Start: 2025-06-19

## 2025-06-14 RX ORDER — DIPHENHYDRAMINE HYDROCHLORIDE 50 MG/ML
25 INJECTION, SOLUTION INTRAMUSCULAR; INTRAVENOUS
Start: 2025-06-19

## 2025-06-14 RX ORDER — METHYLPREDNISOLONE SODIUM SUCCINATE 40 MG/ML
40 INJECTION INTRAMUSCULAR; INTRAVENOUS
Start: 2025-06-19

## 2025-06-14 RX ORDER — MEPERIDINE HYDROCHLORIDE 25 MG/ML
25 INJECTION INTRAMUSCULAR; INTRAVENOUS; SUBCUTANEOUS
OUTPATIENT
Start: 2025-06-19

## 2025-06-14 RX ORDER — ALBUTEROL SULFATE 90 UG/1
1-2 INHALANT RESPIRATORY (INHALATION)
Start: 2025-06-19

## 2025-06-16 DIAGNOSIS — C50.912 CARCINOMA OF LEFT BREAST METASTATIC TO BONE (H): Primary | ICD-10-CM

## 2025-06-16 DIAGNOSIS — Z17.0 MALIGNANT NEOPLASM OF OVERLAPPING SITES OF LEFT BREAST IN FEMALE, ESTROGEN RECEPTOR POSITIVE (H): ICD-10-CM

## 2025-06-16 DIAGNOSIS — C79.51 CARCINOMA OF LEFT BREAST METASTATIC TO BONE (H): Primary | ICD-10-CM

## 2025-06-16 DIAGNOSIS — C50.812 MALIGNANT NEOPLASM OF OVERLAPPING SITES OF LEFT BREAST IN FEMALE, ESTROGEN RECEPTOR POSITIVE (H): ICD-10-CM

## 2025-06-16 RX ORDER — EVEROLIMUS 10 MG/1
10 TABLET ORAL DAILY
Qty: 28 TABLET | Refills: 0 | Status: SHIPPED | OUTPATIENT
Start: 2025-06-19 | End: 2025-07-17

## 2025-06-18 NOTE — PROGRESS NOTES
Oncology Visit:   Date on this visit: Jun 19, 2025    Diagnosis:  ER positive left breast cancer metastasized to bones.     Primary Physician: No Ref-Primary, Physician     History Of Present Illness:    Ms. Sanderson is a 49 year old female with a h/o tobacco abuse and DVTs with left breast cancer metastasized to bone. She presented to [facility] with back pain on 12/5/2020. MRI of the L-spine showed an abnormal L4 lesion with associated right paraspinal mass, abnormalities in L5 and the left iliac bone were also seen. CT C/A/P showed a left breast mass, lytic lesions of T7, L4, and the pelvis, and a 3 cm lesion in the kidney (thought to be a cyst). Ultrasound of the bilateral lower extremities showed a non-occlusive thrombus in the left popliteal vein. Mammogram and ultrasound of the bilateral breasts on 12/17/2020 showed a spiculated mass measuring at least 7.8 cm at 12-1:00 left breast extending from the nipple to 9 cm from the nipple with associated nipple retraction. This mass was biopsied, and showed IDC with surrounding DCIS, grade 3, ER+ 90%, and AL+ 75%.  HER2 was equivocal in approximately 35% of tumor cells by FISH and was negative by IHC.    Metastatic Breast Cancer Treatment:  12/23/2020 - 1/7/2021  Radiation (3000 cGy) to the lumbar spine.  1/29/2021 - 04/2023  Ibrance, zoladex, and anastrozole.  5/17/2021 radiofrequency ablation, kyphoplasty to L4  8/20/2021  Bilateral salpingo-oophorectomies, Ibrance and anastrozole.  She was off anastrozole 12/2021 - 2/2022 and off Ibrance 01/2022 - 02/2022 due to stress and impending homelessness. Off both again 03/2022-04/2022 due to death of her son but restarted in 05/2022.  04/2023  PET/CT with progression in 2 small metastases in the left pelvis.  Palbociclib continued.  Anastrozole changed to exemestane  5/5/2023  Completed radiation, 2000 cGy in 5 fractions, to the left ilium.  12/11/2023 PET/CT with new left breast lesion, new T7 vertebral metastasis and  progression of hypermetabolic foci in the bony pelvis c/w disease progression.  Ibrance and exemestane discontinued.  12/19/2023 Left breast biopsy  [facility] NGS:   ER positive, 3+  100%   FL positive, 1+, 5%   HER2 negative.   AR positive, 1+, 35%   MMR proficient   PD-L1 negative, CPS 0   PTEN positive, 1+ 100%  *Of note, all of the above was IHC, DNA quantity not sufficient for NGS  1/18/24 - 4/2/2025  Fulvestrant + abemaciclib.  Abemaciclib dose reduced to 100 mg PO BID due to hand foot syndrome.  7/18/2024  completed radiation to the left acetabulum and the SI  4/2/2025 acute low back pain, pathologic bilateral pedicle L4 fracture.  5/5/2025  Guardant 360 liquid biopsy:   ESR1 D538G  3.8%   ESR1 Y537N   0.1%   MYC amplification   GATA3 [id number]  4/29/2025 - present  Everolimus and fulvestrant.      Interval History:   Teresa comes into clinic today for metastatic breast cancer follow-up.  Her back pain has continued to be bothersome. She ran out of oxycodone a week ago due to taking more frequently than 4 hours. She is using Belbuca twice daily but is not sure it is helping. She is using gabapentin and muscle relaxant as well. She is looking forward to surgery so her back will feel better. Pain is in the same location.     She has had some irritation on the palms and soles of her feet with scaling and peeling skin. She is using aquaphor and shea butter multiple times per day. This is helping. Has some waxing and waning mouth sores but only notices them when she eats certain things. She is still eating okay and maintaining her weight. Using dexamethasone rinse TID, sometimes more.      She is otherwise tolerating treatment well. No nausea, no bowel changes. No other pain. No fevers/chills, cough, or SOB.     Past Medical/Surgical History:   Past Medical History:   Diagnosis Date    Anxiety     Breast CA (H) 12/2020    Depression     DVT (deep venous thrombosis) (H) 2014    Left breast mass     x approximately  4-5 months    Pulmonary emboli (H)     Pyelonephritis     Schizoaffective disorder (H)     Tobacco use      Past Surgical History:   Procedure Laterality Date    COLONOSCOPY N/A 7/8/2022    Procedure: COLONOSCOPY, WITH POLYPECTOMY;  Surgeon: Ham Cano MD;  Location: [facility] OR    ESOPHAGOSCOPY, GASTROSCOPY, DUODENOSCOPY (EGD), COMBINED N/A 3/7/2025    Procedure: ESOPHAGOGASTRODUODENOSCOPY, WITH BIOPSY;  Surgeon: Nguyen Holliday MD;  Location: [facility] [place] [place] OR    IR LUMBAR KYPHOPLASTY VERTEBRAE  5/17/2021    LAPAROSCOPIC SALPINGO-OOPHORECTOMY Bilateral 8/20/2021    Procedure: BILATERAL SALPINGO-OOPHORECTOMY, LAPAROSCOPIC;  Surgeon: Rory Lopez MD;  Location: [facility] [place] [place] OR    TUBAL LIGATION  1998        Allergies   Allergen Reactions    Contrast Dye      Pt developed nausea after isovue 370 injection on 6/9/21        Current Outpatient Medications   Medication Sig Dispense Refill    dexAMETHasone alcohol-free (DECADRON) 0.1 MG/ML solution Swish 10 mL in mouth for 2 min and spit, four times daily. 1200 mL 1    magic mouthwash suspension (diphenhydrAMINE, lidocaine, aluminum-magnesium & simethicone) Swish and swallow 10 mLs in mouth every 6 hours as needed for mouth sores. 120 mL 1    nicotine (NICODERM CQ) 14 MG/24HR 24 hr patch Place 1 patch over 24 hours onto the skin every 24 hours. 30 patch 0    Buprenorphine HCl (BELBUCA) 300 MCG FILM buccal film Place 1 Film (300 mcg) inside cheek every 12 hours. 28 Film 0    cyclobenzaprine (FLEXERIL) 5 MG tablet Take 1 tablet (5 mg) by mouth 3 times daily. 45 tablet 0    everolimus (AFINITOR) 10 MG tablet Take 1 tablet (10 mg) by mouth daily for 28 days Avoid grapefruit and grapefruit juice. Take with or without food, but should be consistent. 28 tablet 0    everolimus (AFINITOR) 10 MG tablet Take 1 tablet (10 mg) by mouth daily for 28 days Avoid grapefruit and grapefruit juice. Take with or without food, but should be consistent. 28 tablet 0    fulvestrant (FASLODEX) 250  MG/5ML injection Inject 500 mg into the muscle every 28 days.      gabapentin (NEURONTIN) 300 MG capsule Take 1 capsule (300 mg) by mouth 3 times daily. 90 capsule 0    hydrOXYzine HCl (ATARAX) 25 MG tablet Take 1 tablet (25 mg) by mouth 4 times daily as needed for anxiety or other (panic). 120 tablet 2    naloxone (NARCAN) 4 MG/0.1ML nasal spray Spray 1 spray (4 mg) into one nostril alternating nostrils as needed for opioid reversal. every 2-3 minutes until assistance arrives 2 each 0    OLANZapine (ZYPREXA) 5 MG tablet Take 1-2 tablets (5-10 mg) by mouth nightly as needed for sleep. Take 1/2 tablet (2.5mg) daily as needed for hypomania/ tom. 30 tablet 2    oxyCODONE IR (ROXICODONE) 10 MG tablet Take 1 tablet (10 mg) by mouth every 4 hours as needed for severe pain. 90 tablet 0    prochlorperazine (COMPAZINE) 10 MG tablet Take 1 tablet (10 mg) by mouth every 6 hours as needed for nausea or vomiting. 30 tablet 2    QUEtiapine (SEROQUEL) 100 MG tablet Tale 1 tablet (100mg) with 1 tablet (400mg) for total of 500mg by mouth at bedtime 30 tablet 2    QUEtiapine (SEROQUEL) 400 MG tablet Take 1 tablet (400mg) with 1 tablet (100mg) for total of 500mg by mouth at bedtime 30 tablet 2    QUEtiapine (SEROQUEL) 50 MG tablet Take 0.5-1 tablets (25-50 mg) by mouth 2 times daily as needed (for sleep, mood and anxiety). 60 tablet 2    rivaroxaban ANTICOAGULANT (XARELTO) 20 MG TABS tablet Take 1 tablet (20 mg) by mouth daily (with dinner). 90 tablet 3    sertraline (ZOLOFT) 100 MG tablet Take 1 tablet (100 mg) by mouth daily. 30 tablet 3    Vitamin D3 (CHOLECALCIFEROL) 25 mcg (1000 units) tablet Take 1 tablet (25 mcg) by mouth daily. 90 tablet 3     No current facility-administered medications for this visit.   '  Physical exam:  BP (!) 152/97   Pulse 73   Temp 98.1  F (36.7  C) (Oral)   Resp 16   Wt 108.1 kg (238 lb 6.4 oz)   SpO2 99%   BMI 33.25 kg/m    Wt Readings from Last 4 Encounters:   06/19/25 108.1 kg (238 lb 6.4  oz)   05/27/25 109.5 kg (241 lb 6.4 oz)   05/22/25 110.2 kg (243 lb)   05/13/25 113.9 kg (251 lb)   General:  Well appearing adult female in NAD.  Alert and oriented x 3.   HEENT:  Normocephalic.  Sclera anicteric.  MMM.  No lesions of the oropharynx.  Lymph:  No palpable cervical, supraclavicular, or axillary LAD.  Chest:  CTA bilaterally.  No wheezes or crackles.  CV:  RRR.    Abd:  Soft/ND/NT +BS   Ext:  No pitting edema of the bilateral lower extremities.    Musculo:  Moves all 4 extremities appropriately.  Neuro:  Cranial nerves grossly intact.  No tremors or dyskinetic movements.    Psych:  Mood and affect appear normal.  Skin:  Dry palms and soles of feet with peeling skin     Laboratory/Imaging Studies:   I personally reviewed the below laboratories and images while in clinic today:    PET 4/22/25  IMPRESSION:      1.  Stable to mildly increased metabolic activity within the left  breast mass, which is stable in size.  2.  Multiple osseous lesions demonstrate mildly increased FDG uptake,  for example within the posterior right sixth rib, T8 vertebral body,  and multiple lesions within the left hemipelvis.   3.  Increased hypermetabolism within the left pedicle of L4.  Differential includes worsening metastatic disease, as well as  inflammatory changes related to vertebroplasty/healing pedicle  fracture.  4.  Stable mildly FDG avid lesions in the T7 and T9 vertebral bodies,  as well as within the right femoral head.  5.  Additional scattered osseous and periarticular uptake that is  favored to represent degenerative change.  6.  Diffuse increased throughout the stomach, which again may  represent gastritis.  7.  Grossly stable pulmonary nodules, PET indeterminate due to size.  Increased nodular groundglass opacities within the posterior  costophrenic angles which are more likely to reflect areas of  atelectasis or inflammatory change and disease involvement. Recommend  continued attention on follow-up  imaging.  8.  Incidental CT findings, as above     I have personally reviewed the examination and initial interpretation  and I agree with the findings.     KORIN ROD MD      06/19/25 11:21   Sodium 141   Potassium 3.3 (L)   Chloride 105   Carbon Dioxide (CO2) 23   Urea Nitrogen 12.5   Creatinine 0.91   GFR Estimate 77   Calcium 9.0   Anion Gap 13   Albumin 3.7   Protein Total 7.0   Alkaline Phosphatase 159 (H)   ALT 48   AST 42   Bilirubin Total 0.2   Cancer Antigen 15-3 59 (H)   CEA 5.2   Patient Fasting? Unknown   Glucose 283 (H)   WBC 3.2 (L)   Hemoglobin 10.1 (L)   Hematocrit 31.5 (L)   Platelet Count 180   RBC Count 3.54 (L)   MCV 89   MCH 28.5   MCHC 32.1   RDW 13.9   % Neutrophils 54   % Lymphocytes 30   % Monocytes 8   % Eosinophils 7   % Basophils 1   % Immature Granulocytes 0   NRBC/W 0   Absolute Neutrophil 1.7   Absolute Lymphocytes 1.0   Absolute Monocytes 0.3   Absolute Eosinophils 0.2   Absolute Basophils 0.0   Absolute Immature Granulocytes 0.0   Absolute NRBCs 0.0         ASSESSMENT/PLAN:   49 year old female with history of DVT and ER positive left breast cancer metastasized to bones.    1.  Metastatic breast cancer: On treatment with abemaciclib and fulvestrant from 01/2024 - 02/2025.  PET/CT on 2/3/2024 with progression of bone metastases (left sacroiliac, T7 vertebrae, right 6th rib, right femoral head).     - Was on treatment with abemaciclib and fulvestrant.  Presented on 4/6/2025 with acute low back pain.  CT with bilateral pathologic fracture of pedicle of L4.    - On treatment with everolimus 10 mg daily and fulvestrant since 4/29/2025.  Instructed patient to hold everolimus one week prior to planned surgery (last dose on 6/30/2025).  It is okay to resume taking the medication one week after surgery.  - Fulvestrant given today.   - We reviewed guardant 360 liquid biopsy results which demonstrates ESR1 D538G and ESR1 Y537N mutations.  She is a candidate for next line treatment with  elacestrant.  - Next imaging planned for 7/15 with MD follow-up.     2.  Bone metastases/cancer associated pain:  She is s/p XRT to the left ilium as well as the lumbar spine and kyphoplasty of L4--with some extravasation of cement which procedularist is aware of and recommended continued AC indefinitely. Received radiation to the left acetabulum and the SI, completed on 7/18/2024.    - 4/22/2025 PET was with disease progression in the left ilium, posterior T8, and right posterior 6th rib.  - she has bilateral pedicle fracture of L4.  Plan is for L3-L5 fusion with neurosurgery on 7/7/2025.  - continue to wear TLSO brace and avoid bending/twisting or lifting.   - She is taking belbuca, oxycodone, Robaxin, and gabapentin. She has been using more oxycodone. She is seeing palliative care tomorrow and has an alarm set to go to this appointment.   - Receiving Zometa once every 3 months. This month's dose was held due to planned L-spine fusion in July.     3.  Schizoaffective disorder, bipolar type: Zoloft 100 mg daily, seroquel 500 mg at bedtime with PRN hydroxyzine as well as seroquel 25-50 mg BID PRN for anxiety.     4.  FDG uptake stomach/dyspepsia: Had EGD done showing esophagitis and non bleeding gastric ulcers. She was positive for H. Pylori and completed initial treatment for this. She will collect stool for antigen test in the next few days.     5.  Stomatitis: Continue dex rinse. Do not use more than QID. Swallow only if she has a sore throat. Rx for MMW to use up to QID as well.     6. Hyperglycemia: Labs were nonfasting today with BG of 283. Will have her come back tomorrow for fasting labs. If FBG is elevated will need to check more frequent FBG and possible medical management.     7. Smoking cessation: She is motivated to quit. Prescribed 14 mg nicotine patches to use daily x 6 weeks and then can step down.     40 minutes spent on the date of the encounter doing chart review, review of test results,  interpretation of tests, patient visit, and documentation     The longitudinal plan of care for the diagnosis(es)/condition(s) as documented were addressed during this visit. Due to the added complexity in care, I will continue to support Alcon-malgorzata in the subsequent management and with ongoing continuity of care.      Alee De Los Santos PA-C

## 2025-06-19 ENCOUNTER — ONCOLOGY VISIT (OUTPATIENT)
Dept: ONCOLOGY | Facility: CLINIC | Age: 50
End: 2025-06-19
Attending: PHYSICIAN ASSISTANT
Payer: MEDICARE

## 2025-06-19 ENCOUNTER — APPOINTMENT (OUTPATIENT)
Dept: LAB | Facility: CLINIC | Age: 50
End: 2025-06-19
Attending: INTERNAL MEDICINE
Payer: MEDICARE

## 2025-06-19 ENCOUNTER — LAB (OUTPATIENT)
Dept: LAB | Facility: CLINIC | Age: 50
End: 2025-06-19
Payer: MEDICARE

## 2025-06-19 VITALS
DIASTOLIC BLOOD PRESSURE: 97 MMHG | WEIGHT: 238.4 LBS | RESPIRATION RATE: 16 BRPM | OXYGEN SATURATION: 99 % | TEMPERATURE: 98.1 F | SYSTOLIC BLOOD PRESSURE: 152 MMHG | HEART RATE: 73 BPM | BODY MASS INDEX: 33.25 KG/M2

## 2025-06-19 DIAGNOSIS — C50.812 MALIGNANT NEOPLASM OF OVERLAPPING SITES OF LEFT BREAST IN FEMALE, ESTROGEN RECEPTOR POSITIVE (H): Primary | ICD-10-CM

## 2025-06-19 DIAGNOSIS — Z17.0 MALIGNANT NEOPLASM OF OVERLAPPING SITES OF LEFT BREAST IN FEMALE, ESTROGEN RECEPTOR POSITIVE (H): Primary | ICD-10-CM

## 2025-06-19 DIAGNOSIS — B96.81 GASTRIC ULCER DUE TO HELICOBACTER PYLORI, CHRONIC: ICD-10-CM

## 2025-06-19 DIAGNOSIS — K13.79 MOUTH SORES: ICD-10-CM

## 2025-06-19 DIAGNOSIS — C50.912 CARCINOMA OF LEFT BREAST METASTATIC TO BONE (H): ICD-10-CM

## 2025-06-19 DIAGNOSIS — Z71.6 ENCOUNTER FOR SMOKING CESSATION COUNSELING: ICD-10-CM

## 2025-06-19 DIAGNOSIS — C79.51 CARCINOMA OF LEFT BREAST METASTATIC TO BONE (H): ICD-10-CM

## 2025-06-19 DIAGNOSIS — R73.9 HYPERGLYCEMIA: ICD-10-CM

## 2025-06-19 DIAGNOSIS — K25.7 GASTRIC ULCER DUE TO HELICOBACTER PYLORI, CHRONIC: ICD-10-CM

## 2025-06-19 LAB
ALBUMIN SERPL BCG-MCNC: 3.7 G/DL (ref 3.5–5.2)
ALP SERPL-CCNC: 159 U/L (ref 40–150)
ALT SERPL W P-5'-P-CCNC: 48 U/L (ref 0–50)
ANION GAP SERPL CALCULATED.3IONS-SCNC: 13 MMOL/L (ref 7–15)
AST SERPL W P-5'-P-CCNC: 42 U/L (ref 0–45)
BASOPHILS # BLD AUTO: 0 10E3/UL (ref 0–0.2)
BASOPHILS NFR BLD AUTO: 1 %
BILIRUB SERPL-MCNC: 0.2 MG/DL
BUN SERPL-MCNC: 12.5 MG/DL (ref 6–20)
CALCIUM SERPL-MCNC: 9 MG/DL (ref 8.8–10.4)
CANCER AG15-3 SERPL-ACNC: 59 U/ML
CEA SERPL-MCNC: 5.2 NG/ML
CHLORIDE SERPL-SCNC: 105 MMOL/L (ref 98–107)
CREAT SERPL-MCNC: 0.91 MG/DL (ref 0.51–0.95)
EGFRCR SERPLBLD CKD-EPI 2021: 77 ML/MIN/1.73M2
EOSINOPHIL # BLD AUTO: 0.2 10E3/UL (ref 0–0.7)
EOSINOPHIL NFR BLD AUTO: 7 %
ERYTHROCYTE [DISTWIDTH] IN BLOOD BY AUTOMATED COUNT: 13.9 % (ref 10–15)
FASTING STATUS PATIENT QL REPORTED: ABNORMAL
GLUCOSE SERPL-MCNC: 283 MG/DL (ref 70–99)
HCO3 SERPL-SCNC: 23 MMOL/L (ref 22–29)
HCT VFR BLD AUTO: 31.5 % (ref 35–47)
HGB BLD-MCNC: 10.1 G/DL (ref 11.7–15.7)
IMM GRANULOCYTES # BLD: 0 10E3/UL
IMM GRANULOCYTES NFR BLD: 0 %
LYMPHOCYTES # BLD AUTO: 1 10E3/UL (ref 0.8–5.3)
LYMPHOCYTES NFR BLD AUTO: 30 %
MCH RBC QN AUTO: 28.5 PG (ref 26.5–33)
MCHC RBC AUTO-ENTMCNC: 32.1 G/DL (ref 31.5–36.5)
MCV RBC AUTO: 89 FL (ref 78–100)
MONOCYTES # BLD AUTO: 0.3 10E3/UL (ref 0–1.3)
MONOCYTES NFR BLD AUTO: 8 %
NEUTROPHILS # BLD AUTO: 1.7 10E3/UL (ref 1.6–8.3)
NEUTROPHILS NFR BLD AUTO: 54 %
NRBC # BLD AUTO: 0 10E3/UL
NRBC BLD AUTO-RTO: 0 /100
PLATELET # BLD AUTO: 180 10E3/UL (ref 150–450)
POTASSIUM SERPL-SCNC: 3.3 MMOL/L (ref 3.4–5.3)
PROT SERPL-MCNC: 7 G/DL (ref 6.4–8.3)
RBC # BLD AUTO: 3.54 10E6/UL (ref 3.8–5.2)
SODIUM SERPL-SCNC: 141 MMOL/L (ref 135–145)
WBC # BLD AUTO: 3.2 10E3/UL (ref 4–11)

## 2025-06-19 PROCEDURE — 36415 COLL VENOUS BLD VENIPUNCTURE: CPT | Performed by: PHYSICIAN ASSISTANT

## 2025-06-19 PROCEDURE — 80053 COMPREHEN METABOLIC PANEL: CPT | Performed by: PHYSICIAN ASSISTANT

## 2025-06-19 PROCEDURE — 250N000011 HC RX IP 250 OP 636: Mod: JZ | Performed by: INTERNAL MEDICINE

## 2025-06-19 PROCEDURE — 82378 CARCINOEMBRYONIC ANTIGEN: CPT | Performed by: PHYSICIAN ASSISTANT

## 2025-06-19 PROCEDURE — 85004 AUTOMATED DIFF WBC COUNT: CPT | Performed by: PHYSICIAN ASSISTANT

## 2025-06-19 PROCEDURE — 86300 IMMUNOASSAY TUMOR CA 15-3: CPT | Performed by: PHYSICIAN ASSISTANT

## 2025-06-19 PROCEDURE — G0463 HOSPITAL OUTPT CLINIC VISIT: HCPCS | Performed by: PHYSICIAN ASSISTANT

## 2025-06-19 RX ORDER — METHYLPREDNISOLONE SODIUM SUCCINATE 40 MG/ML
40 INJECTION INTRAMUSCULAR; INTRAVENOUS
Start: 2025-07-17

## 2025-06-19 RX ORDER — DIPHENHYDRAMINE HYDROCHLORIDE 50 MG/ML
25 INJECTION, SOLUTION INTRAMUSCULAR; INTRAVENOUS
Start: 2025-07-17

## 2025-06-19 RX ORDER — ALBUTEROL SULFATE 0.83 MG/ML
2.5 SOLUTION RESPIRATORY (INHALATION)
OUTPATIENT
Start: 2025-07-17

## 2025-06-19 RX ORDER — DIPHENHYDRAMINE HYDROCHLORIDE 50 MG/ML
50 INJECTION, SOLUTION INTRAMUSCULAR; INTRAVENOUS
Start: 2025-07-17

## 2025-06-19 RX ORDER — ALBUTEROL SULFATE 90 UG/1
1-2 INHALANT RESPIRATORY (INHALATION)
Start: 2025-07-17

## 2025-06-19 RX ORDER — EPINEPHRINE 1 MG/ML
0.3 INJECTION, SOLUTION INTRAMUSCULAR; SUBCUTANEOUS EVERY 5 MIN PRN
OUTPATIENT
Start: 2025-07-17

## 2025-06-19 RX ORDER — LAMOTRIGINE 25 MG/1
500 TABLET ORAL ONCE
Status: COMPLETED | OUTPATIENT
Start: 2025-06-19 | End: 2025-06-19

## 2025-06-19 RX ORDER — NICOTINE 21 MG/24HR
1 PATCH, TRANSDERMAL 24 HOURS TRANSDERMAL EVERY 24 HOURS
Qty: 30 PATCH | Refills: 0 | Status: SHIPPED | OUTPATIENT
Start: 2025-06-19

## 2025-06-19 RX ORDER — LAMOTRIGINE 25 MG/1
500 TABLET ORAL ONCE
OUTPATIENT
Start: 2025-07-17 | End: 2025-07-17

## 2025-06-19 RX ORDER — DEXAMETHASONE 0.5 MG/5ML
SOLUTION ORAL
Qty: 1200 ML | Refills: 1 | Status: SHIPPED | OUTPATIENT
Start: 2025-06-19

## 2025-06-19 RX ORDER — MEPERIDINE HYDROCHLORIDE 25 MG/ML
25 INJECTION INTRAMUSCULAR; INTRAVENOUS; SUBCUTANEOUS
OUTPATIENT
Start: 2025-07-17

## 2025-06-19 RX ADMIN — FULVESTRANT 500 MG: 50 INJECTION INTRAMUSCULAR at 12:10

## 2025-06-19 ASSESSMENT — PAIN SCALES - GENERAL: PAINLEVEL_OUTOF10: SEVERE PAIN (8)

## 2025-06-19 NOTE — PATIENT INSTRUCTIONS
Stop everolimus (afinitor) on Monday June 30th. Okay to resume on Monday 7/14.     No ibuprofen, no aleve, no advil. Tylenol is okay.    no

## 2025-06-19 NOTE — NURSING NOTE
"Oncology Rooming Note    June 19, 2025 11:32 AM   Teresa Sanderson is a 49 year old female who presents for:    Chief Complaint   Patient presents with    Blood Draw     Vpt blood draw by lab RN    Oncology Clinic Visit     Carcinoma of left breast metastatic to bone     Initial Vitals: BP (!) 152/97   Pulse 73   Temp 98.1  F (36.7  C) (Oral)   Resp 16   Wt 108.1 kg (238 lb 6.4 oz)   SpO2 99%   BMI 33.25 kg/m   Estimated body mass index is 33.25 kg/m  as calculated from the following:    Height as of 5/27/25: 1.803 m (5' 11\").    Weight as of this encounter: 108.1 kg (238 lb 6.4 oz). Body surface area is 2.33 meters squared.  Severe Pain (8) Comment: back   No LMP recorded. (Menstrual status: Tubal ).  Allergies reviewed: Yes  Medications reviewed: Yes    Medications: MEDICATION REFILLS NEEDED TODAY. Provider was notified.  Pharmacy name entered into EPIC:    [facility][id number] - [place], [place] - [address] AT [facility] [address]  [facility] - [place], [place] - [address] [facility] [address] [facility]  [facility] #[id number] - [place], [place] - [address] AT [facility] & [facility]  [facility] #[id number] - [place], [place] - [address] & [facility] - [place], [place] - [address]  [facility] #[id number] - [place], [place] - [address] AT [facility] [address] [facility]ARMACY [place], [place] - [address]    Frailty Screening:   Is the patient here for a new oncology consult visit in cancer care? 2. No    PHQ9:  Did this patient require a PHQ9?: No      Clinical concerns: Refill on oxycodone. Patient is requesting nicotine patches.      Angy St              "

## 2025-06-19 NOTE — NURSING NOTE
Faslodex given in bilateral ventrogluteal sites without incident. Injections given simultaneously with Anca Del Angel RN.     Lori Hernandez RN

## 2025-06-19 NOTE — LETTER
6/19/2025      Teresa Sanderson  [address] [facility] [place] [place] [address]      Dear Colleague,    Thank you for referring your patient, Teresa Sanderson, to the [facility]. Please see a copy of my visit note below.    Oncology Visit:   Date on this visit: Jun 19, 2025    Diagnosis:  ER positive left breast cancer metastasized to bones.     Primary Physician: No Ref-Primary, Physician     History Of Present Illness:    Ms. Sanderson is a 49 year old female with a h/o tobacco abuse and DVTs with left breast cancer metastasized to bone. She presented to [facility] ED with back pain on 12/5/2020. MRI of the L-spine showed an abnormal L4 lesion with associated right paraspinal mass, abnormalities in L5 and the left iliac bone were also seen. CT C/A/P showed a left breast mass, lytic lesions of T7, L4, and the pelvis, and a 3 cm lesion in the kidney (thought to be a cyst). Ultrasound of the bilateral lower extremities showed a non-occlusive thrombus in the left popliteal vein. Mammogram and ultrasound of the bilateral breasts on 12/17/2020 showed a spiculated mass measuring at least 7.8 cm at 12-1:00 left breast extending from the nipple to 9 cm from the nipple with associated nipple retraction. This mass was biopsied, and showed IDC with surrounding DCIS, grade 3, ER+ 90%, and MI+ 75%.  HER2 was equivocal in approximately 35% of tumor cells by FISH and was negative by IHC.    Metastatic Breast Cancer Treatment:  12/23/2020 - 1/7/2021  Radiation (3000 cGy) to the lumbar spine.  1/29/2021 - 04/2023  Ibrance, zoladex, and anastrozole.  5/17/2021 radiofrequency ablation, kyphoplasty to L4  8/20/2021  Bilateral salpingo-oophorectomies, Ibrance and anastrozole.  She was off anastrozole 12/2021 - 2/2022 and off Ibrance 01/2022 - 02/2022 due to stress and impending homelessness. Off both again 03/2022-04/2022 due to death of her son but restarted in 05/2022.  04/2023  PET/CT with progression in 2 small  metastases in the left pelvis.  Palbociclib continued.  Anastrozole changed to exemestane  5/5/2023  Completed radiation, 2000 cGy in 5 fractions, to the left ilium.  12/11/2023 PET/CT with new left breast lesion, new T7 vertebral metastasis and progression of hypermetabolic foci in the bony pelvis c/w disease progression.  Ibrance and exemestane discontinued.  12/19/2023 Left breast biopsy  [facility] NGS:   ER positive, 3+  100%   NC positive, 1+, 5%   HER2 negative.   AR positive, 1+, 35%   MMR proficient   PD-L1 negative, CPS 0   PTEN positive, 1+ 100%  *Of note, all of the above was IHC, DNA quantity not sufficient for NGS  1/18/24 - 4/2/2025  Fulvestrant + abemaciclib.  Abemaciclib dose reduced to 100 mg PO BID due to hand foot syndrome.  7/18/2024  completed radiation to the left acetabulum and the SI  4/2/2025 acute low back pain, pathologic bilateral pedicle L4 fracture.  5/5/2025  Guardant 360 liquid biopsy:   ESR1 D538G  3.8%   ESR1 Y537N   0.1%   MYC amplification   GATA3 [id number]  4/29/2025 - present  Everolimus and fulvestrant.      Interval History:   Teresa comes into clinic today for metastatic breast cancer follow-up.  Her back pain has continued to be bothersome. She ran out of oxycodone a week ago due to taking more frequently than 4 hours. She is using Belbuca twice daily but is not sure it is helping. She is using gabapentin and muscle relaxant as well. She is looking forward to surgery so her back will feel better. Pain is in the same location.     She has had some irritation on the palms and soles of her feet with scaling and peeling skin. She is using aquaphor and shea butter multiple times per day. This is helping. Has some waxing and waning mouth sores but only notices them when she eats certain things. She is still eating okay and maintaining her weight. Using dexamethasone rinse TID, sometimes more.      She is otherwise tolerating treatment well. No nausea, no bowel changes. No other  pain. No fevers/chills, cough, or SOB.     Past Medical/Surgical History:   Past Medical History:   Diagnosis Date     Anxiety      Breast CA (H) 12/2020     Depression      DVT (deep venous thrombosis) (H) 2014     Left breast mass     x approximately 4-5 months     Pulmonary emboli (H)      Pyelonephritis      Schizoaffective disorder (H)      Tobacco use      Past Surgical History:   Procedure Laterality Date     COLONOSCOPY N/A 7/8/2022    Procedure: COLONOSCOPY, WITH POLYPECTOMY;  Surgeon: Ham Cano MD;  Location: [facility] OR     ESOPHAGOSCOPY, GASTROSCOPY, DUODENOSCOPY (EGD), COMBINED N/A 3/7/2025    Procedure: ESOPHAGOGASTRODUODENOSCOPY, WITH BIOPSY;  Surgeon: Nguyen Holliday MD;  Location: [facility] OR     IR LUMBAR KYPHOPLASTY VERTEBRAE  5/17/2021     LAPAROSCOPIC SALPINGO-OOPHORECTOMY Bilateral 8/20/2021    Procedure: BILATERAL SALPINGO-OOPHORECTOMY, LAPAROSCOPIC;  Surgeon: Rory Lopez MD;  Location: [facility] OR     TUBAL LIGATION  1998        Allergies   Allergen Reactions     Contrast Dye      Pt developed nausea after isovue 370 injection on 6/9/21        Current Outpatient Medications   Medication Sig Dispense Refill     dexAMETHasone alcohol-free (DECADRON) 0.1 MG/ML solution Swish 10 mL in mouth for 2 min and spit, four times daily. 1200 mL 1     magic mouthwash suspension (diphenhydrAMINE, lidocaine, aluminum-magnesium & simethicone) Swish and swallow 10 mLs in mouth every 6 hours as needed for mouth sores. 120 mL 1     nicotine (NICODERM CQ) 14 MG/24HR 24 hr patch Place 1 patch over 24 hours onto the skin every 24 hours. 30 patch 0     Buprenorphine HCl (BELBUCA) 300 MCG FILM buccal film Place 1 Film (300 mcg) inside cheek every 12 hours. 28 Film 0     cyclobenzaprine (FLEXERIL) 5 MG tablet Take 1 tablet (5 mg) by mouth 3 times daily. 45 tablet 0     everolimus (AFINITOR) 10 MG tablet Take 1 tablet (10 mg) by mouth daily for 28 days Avoid grapefruit and grapefruit juice. Take with or  without food, but should be consistent. 28 tablet 0     everolimus (AFINITOR) 10 MG tablet Take 1 tablet (10 mg) by mouth daily for 28 days Avoid grapefruit and grapefruit juice. Take with or without food, but should be consistent. 28 tablet 0     fulvestrant (FASLODEX) 250 MG/5ML injection Inject 500 mg into the muscle every 28 days.       gabapentin (NEURONTIN) 300 MG capsule Take 1 capsule (300 mg) by mouth 3 times daily. 90 capsule 0     hydrOXYzine HCl (ATARAX) 25 MG tablet Take 1 tablet (25 mg) by mouth 4 times daily as needed for anxiety or other (panic). 120 tablet 2     naloxone (NARCAN) 4 MG/0.1ML nasal spray Spray 1 spray (4 mg) into one nostril alternating nostrils as needed for opioid reversal. every 2-3 minutes until assistance arrives 2 each 0     OLANZapine (ZYPREXA) 5 MG tablet Take 1-2 tablets (5-10 mg) by mouth nightly as needed for sleep. Take 1/2 tablet (2.5mg) daily as needed for hypomania/ tom. 30 tablet 2     oxyCODONE IR (ROXICODONE) 10 MG tablet Take 1 tablet (10 mg) by mouth every 4 hours as needed for severe pain. 90 tablet 0     prochlorperazine (COMPAZINE) 10 MG tablet Take 1 tablet (10 mg) by mouth every 6 hours as needed for nausea or vomiting. 30 tablet 2     QUEtiapine (SEROQUEL) 100 MG tablet Tale 1 tablet (100mg) with 1 tablet (400mg) for total of 500mg by mouth at bedtime 30 tablet 2     QUEtiapine (SEROQUEL) 400 MG tablet Take 1 tablet (400mg) with 1 tablet (100mg) for total of 500mg by mouth at bedtime 30 tablet 2     QUEtiapine (SEROQUEL) 50 MG tablet Take 0.5-1 tablets (25-50 mg) by mouth 2 times daily as needed (for sleep, mood and anxiety). 60 tablet 2     rivaroxaban ANTICOAGULANT (XARELTO) 20 MG TABS tablet Take 1 tablet (20 mg) by mouth daily (with dinner). 90 tablet 3     sertraline (ZOLOFT) 100 MG tablet Take 1 tablet (100 mg) by mouth daily. 30 tablet 3     Vitamin D3 (CHOLECALCIFEROL) 25 mcg (1000 units) tablet Take 1 tablet (25 mcg) by mouth daily. 90 tablet 3      No current facility-administered medications for this visit.   '  Physical exam:  BP (!) 152/97   Pulse 73   Temp 98.1  F (36.7  C) (Oral)   Resp 16   Wt 108.1 kg (238 lb 6.4 oz)   SpO2 99%   BMI 33.25 kg/m    Wt Readings from Last 4 Encounters:   06/19/25 108.1 kg (238 lb 6.4 oz)   05/27/25 109.5 kg (241 lb 6.4 oz)   05/22/25 110.2 kg (243 lb)   05/13/25 113.9 kg (251 lb)   General:  Well appearing adult female in NAD.  Alert and oriented x 3.   HEENT:  Normocephalic.  Sclera anicteric.  MMM.  No lesions of the oropharynx.  Lymph:  No palpable cervical, supraclavicular, or axillary LAD.  Chest:  CTA bilaterally.  No wheezes or crackles.  CV:  RRR.    Abd:  Soft/ND/NT +BS   Ext:  No pitting edema of the bilateral lower extremities.    Musculo:  Moves all 4 extremities appropriately.  Neuro:  Cranial nerves grossly intact.  No tremors or dyskinetic movements.    Psych:  Mood and affect appear normal.  Skin:  Dry palms and soles of feet with peeling skin     Laboratory/Imaging Studies:   I personally reviewed the below laboratories and images while in clinic today:    PET 4/22/25  IMPRESSION:      1.  Stable to mildly increased metabolic activity within the left  breast mass, which is stable in size.  2.  Multiple osseous lesions demonstrate mildly increased FDG uptake,  for example within the posterior right sixth rib, T8 vertebral body,  and multiple lesions within the left hemipelvis.   3.  Increased hypermetabolism within the left pedicle of L4.  Differential includes worsening metastatic disease, as well as  inflammatory changes related to vertebroplasty/healing pedicle  fracture.  4.  Stable mildly FDG avid lesions in the T7 and T9 vertebral bodies,  as well as within the right femoral head.  5.  Additional scattered osseous and periarticular uptake that is  favored to represent degenerative change.  6.  Diffuse increased throughout the stomach, which again may  represent gastritis.  7.  Grossly stable  pulmonary nodules, PET indeterminate due to size.  Increased nodular groundglass opacities within the posterior  costophrenic angles which are more likely to reflect areas of  atelectasis or inflammatory change and disease involvement. Recommend  continued attention on follow-up imaging.  8.  Incidental CT findings, as above     I have personally reviewed the examination and initial interpretation  and I agree with the findings.     KORIN ROD MD      06/19/25 11:21   Sodium 141   Potassium 3.3 (L)   Chloride 105   Carbon Dioxide (CO2) 23   Urea Nitrogen 12.5   Creatinine 0.91   GFR Estimate 77   Calcium 9.0   Anion Gap 13   Albumin 3.7   Protein Total 7.0   Alkaline Phosphatase 159 (H)   ALT 48   AST 42   Bilirubin Total 0.2   Cancer Antigen 15-3 59 (H)   CEA 5.2   Patient Fasting? Unknown   Glucose 283 (H)   WBC 3.2 (L)   Hemoglobin 10.1 (L)   Hematocrit 31.5 (L)   Platelet Count 180   RBC Count 3.54 (L)   MCV 89   MCH 28.5   MCHC 32.1   RDW 13.9   % Neutrophils 54   % Lymphocytes 30   % Monocytes 8   % Eosinophils 7   % Basophils 1   % Immature Granulocytes 0   NRBC/W 0   Absolute Neutrophil 1.7   Absolute Lymphocytes 1.0   Absolute Monocytes 0.3   Absolute Eosinophils 0.2   Absolute Basophils 0.0   Absolute Immature Granulocytes 0.0   Absolute NRBCs 0.0         ASSESSMENT/PLAN:   49 year old female with history of DVT and ER positive left breast cancer metastasized to bones.    1.  Metastatic breast cancer: On treatment with abemaciclib and fulvestrant from 01/2024 - 02/2025.  PET/CT on 2/3/2024 with progression of bone metastases (left sacroiliac, T7 vertebrae, right 6th rib, right femoral head).     - Was on treatment with abemaciclib and fulvestrant.  Presented on 4/6/2025 with acute low back pain.  CT with bilateral pathologic fracture of pedicle of L4.    - On treatment with everolimus 10 mg daily and fulvestrant since 4/29/2025.  Instructed patient to hold everolimus one week prior to planned surgery  (last dose on 6/30/2025).  It is okay to resume taking the medication one week after surgery.  - Fulvestrant given today.   - We reviewed guardant 360 liquid biopsy results which demonstrates ESR1 D538G and ESR1 Y537N mutations.  She is a candidate for next line treatment with elacestrant.  - Next imaging planned for 7/15 with MD follow-up.     2.  Bone metastases/cancer associated pain:  She is s/p XRT to the left ilium as well as the lumbar spine and kyphoplasty of L4--with some extravasation of cement which procedularist is aware of and recommended continued AC indefinitely. Received radiation to the left acetabulum and the SI, completed on 7/18/2024.    - 4/22/2025 PET was with disease progression in the left ilium, posterior T8, and right posterior 6th rib.  - she has bilateral pedicle fracture of L4.  Plan is for L3-L5 fusion with neurosurgery on 7/7/2025.  - continue to wear TLSO brace and avoid bending/twisting or lifting.   - She is taking belbuca, oxycodone, Robaxin, and gabapentin. She has been using more oxycodone. She is seeing palliative care tomorrow and has an alarm set to go to this appointment.   - Receiving Zometa once every 3 months. This month's dose was held due to planned L-spine fusion in July.     3.  Schizoaffective disorder, bipolar type: Zoloft 100 mg daily, seroquel 500 mg at bedtime with PRN hydroxyzine as well as seroquel 25-50 mg BID PRN for anxiety.     4.  FDG uptake stomach/dyspepsia: Had EGD done showing esophagitis and non bleeding gastric ulcers. She was positive for H. Pylori and completed initial treatment for this. She will collect stool for antigen test in the next few days.     5.  Stomatitis: Continue dex rinse. Do not use more than QID. Swallow only if she has a sore throat. Rx for MMW to use up to QID as well.     6. Hyperglycemia: Labs were nonfasting today with BG of 283. Will have her come back tomorrow for fasting labs. If FBG is elevated will need to check more  frequent FBG and possible medical management.     7. Smoking cessation: She is motivated to quit. Prescribed 14 mg nicotine patches to use daily x 6 weeks and then can step down.     40 minutes spent on the date of the encounter doing chart review, review of test results, interpretation of tests, patient visit, and documentation     The longitudinal plan of care for the diagnosis(es)/condition(s) as documented were addressed during this visit. Due to the added complexity in care, I will continue to support Alcon-malgorzata in the subsequent management and with ongoing continuity of care.      Alee De Los Santos PA-C                   Again, thank you for allowing me to participate in the care of your patient.        Sincerely,        Alee De Los Santos PA-C    Electronically signed

## 2025-06-19 NOTE — NURSING NOTE
Chief Complaint   Patient presents with    Blood Draw     Vpt blood draw by lab RN     Venipuncture labs drawn by lab RN, vitals taken and appointment arrived.    Patient not fasting and system would not let me uncollect the lipid panel. Note written for first floor lab to not collect the lipid panel.     Liz Alvarado, RN

## 2025-06-22 LAB
ABO + RH BLD: NORMAL
BLD GP AB SCN SERPL QL: NEGATIVE
SPECIMEN EXP DATE BLD: NORMAL

## 2025-06-23 ENCOUNTER — RESULTS FOLLOW-UP (OUTPATIENT)
Dept: SURGERY | Facility: CLINIC | Age: 50
End: 2025-06-23

## 2025-06-23 ENCOUNTER — ANESTHESIA EVENT (OUTPATIENT)
Dept: SURGERY | Facility: CLINIC | Age: 50
End: 2025-06-23
Payer: MEDICARE

## 2025-06-23 ENCOUNTER — OFFICE VISIT (OUTPATIENT)
Dept: SURGERY | Facility: CLINIC | Age: 50
End: 2025-06-23
Payer: MEDICARE

## 2025-06-23 ENCOUNTER — LAB (OUTPATIENT)
Dept: LAB | Facility: CLINIC | Age: 50
End: 2025-06-23
Attending: PHYSICIAN ASSISTANT
Payer: MEDICARE

## 2025-06-23 ENCOUNTER — TELEPHONE (OUTPATIENT)
Dept: ONCOLOGY | Facility: CLINIC | Age: 50
End: 2025-06-23

## 2025-06-23 VITALS
TEMPERATURE: 98.2 F | BODY MASS INDEX: 31.69 KG/M2 | HEIGHT: 72 IN | DIASTOLIC BLOOD PRESSURE: 89 MMHG | WEIGHT: 234 LBS | SYSTOLIC BLOOD PRESSURE: 138 MMHG | OXYGEN SATURATION: 99 % | HEART RATE: 80 BPM | RESPIRATION RATE: 16 BRPM

## 2025-06-23 DIAGNOSIS — E11.9 TYPE 2 DIABETES MELLITUS WITHOUT COMPLICATION, WITHOUT LONG-TERM CURRENT USE OF INSULIN (H): ICD-10-CM

## 2025-06-23 DIAGNOSIS — M84.58XA PATHOLOGICAL FRACTURE OF VERTEBRA DUE TO MALIGNANT NEOPLASM METASTATIC TO BONE (H): ICD-10-CM

## 2025-06-23 DIAGNOSIS — Z01.818 PRE-OPERATIVE EXAMINATION: Primary | ICD-10-CM

## 2025-06-23 DIAGNOSIS — Z01.818 PRE-OPERATIVE EXAMINATION: ICD-10-CM

## 2025-06-23 DIAGNOSIS — C79.51 PATHOLOGICAL FRACTURE OF VERTEBRA DUE TO MALIGNANT NEOPLASM METASTATIC TO BONE (H): ICD-10-CM

## 2025-06-23 LAB
ERYTHROCYTE [DISTWIDTH] IN BLOOD BY AUTOMATED COUNT: 14.1 % (ref 10–15)
FERRITIN SERPL-MCNC: 222 NG/ML (ref 6–175)
HCT VFR BLD AUTO: 31.4 % (ref 35–47)
HGB BLD-MCNC: 9.9 G/DL (ref 11.7–15.7)
HOLD SPECIMEN: NORMAL
IRON BINDING CAPACITY (ROCHE): 245 UG/DL (ref 240–430)
IRON SATN MFR SERPL: 17 % (ref 15–46)
IRON SERPL-MCNC: 42 UG/DL (ref 37–145)
MCH RBC QN AUTO: 28.1 PG (ref 26.5–33)
MCHC RBC AUTO-ENTMCNC: 31.5 G/DL (ref 31.5–36.5)
MCV RBC AUTO: 89 FL (ref 78–100)
PLATELET # BLD AUTO: 165 10E3/UL (ref 150–450)
RBC # BLD AUTO: 3.52 10E6/UL (ref 3.8–5.2)
WBC # BLD AUTO: 3.5 10E3/UL (ref 4–11)

## 2025-06-23 PROCEDURE — 82728 ASSAY OF FERRITIN: CPT

## 2025-06-23 PROCEDURE — 86900 BLOOD TYPING SEROLOGIC ABO: CPT

## 2025-06-23 PROCEDURE — 85027 COMPLETE CBC AUTOMATED: CPT

## 2025-06-23 PROCEDURE — 83550 IRON BINDING TEST: CPT

## 2025-06-23 PROCEDURE — 99204 OFFICE O/P NEW MOD 45 MIN: CPT | Performed by: PHYSICIAN ASSISTANT

## 2025-06-23 PROCEDURE — 36415 COLL VENOUS BLD VENIPUNCTURE: CPT

## 2025-06-23 ASSESSMENT — PAIN SCALES - GENERAL: PAINLEVEL_OUTOF10: SEVERE PAIN (7)

## 2025-06-23 ASSESSMENT — ENCOUNTER SYMPTOMS: SEIZURES: 0

## 2025-06-23 ASSESSMENT — LIFESTYLE VARIABLES: TOBACCO_USE: 1

## 2025-06-23 NOTE — NURSING NOTE
Chief Complaint   Patient presents with    Labs Only    Blood Draw     Labs drawn via VPT by lab RN     Venipuncture labs drawn by lab RN,     Maritza Herrera RN

## 2025-06-23 NOTE — PATIENT INSTRUCTIONS
Preparing for Your Surgery      Name:  Teresa Sanderson   MRN:  [id number]   :  1975   Today's Date:  2025     The [place] [employer] [address] Construction Project                                Timeline 2025 -2025    This project will affect travel to the [facility], as well as the [facility] and [facility].      Please check the [facility] [address] project website for the most up to date information and give yourself additional time to reach your destination.        Arriving for surgery:  Surgery date:  2025  Arrival time:  9:20 AM    Please come to:     Please come to:       [facility]    [address] [place]  [address]     The [facility] [place] Patient/Visitor Ramp is at [address]. Patients and visitors who self-park will receive the reduced hospital parking rate. If the Patient /Visitor Ramp is full, please follow the signs to the [employer] car park located at the [facility] entrance.      [employer] parking is available (24 hours/ 7 days a week)      Discounted parking pass options are available for patients and visitors. They can be purchased at the [employer] desk at the [facility] entrance.     -    Stop at the security desk and they will direct surgery patients to the Surgery Check in and Family Lounge. [phone]        - If you need directions, a wheelchair or an escort please stop at the Information/security desk in the lobby.     What can I eat or drink?  -  You may eat and drink normally up to 8 hours prior to arrival time. (Until 1:20 AM)  -  You may have clear liquids until 2 hours prior to arrival time. (Until 7:20 AM)    Examples of clear liquids:  Water  Clear broth  Juices (apple, white grape, white cranberry  and cider) without pulp  Noncarbonated, powder based beverages  (lemonade and  Ephraim-Aid)  Sodas (Sprite, 7-Up, ginger ale and seltzer)  Coffee or tea (without milk or cream)  Gatorade    -  No Alcohol or cannabis products for at least 24 hours before surgery.     Which medicines can I take?    Hold Aspirin for 7 days before surgery.   Hold Multivitamins for 7 days before surgery.  Hold Supplements for 7 days before surgery.  Hold Ibuprofen (Advil, Motrin) for 3 day(s) before surgery--unless otherwise directed by surgeon.  Hold Naproxen (Aleve) for 4 days before surgery.    **Hold Xarelto for 72 hours before surgery. Last dose 7/3.   **Per Hem/Onc stop Everolimus on 6/30    -  DO NOT take these medications the day of surgery:  Hydroxyzine  Vitamin D3    -  PLEASE TAKE these medications the day of surgery:  Belbuca if needed  Oxycodone if needed  Gabapentin  Zoloft  Compazine if needed  Flexeril if needed    **Can take evening medications (Zyprexa, Seroquel)    How do I prepare myself?  - Please take 2 showers (one the night prior to surgery and one the morning of surgery) using Scrubcare or Hibiclens soap.    Use this soap only from the neck to your toes. Avoid genital area      Leave the soap on your skin for one minute--then rinse thoroughly.      You may use your own shampoo and conditioner. No other hair products.   - Please remove all jewelry and body piercings.  - No lotions, deodorants or fragrance.  - No makeup or fingernail polish.   - Bring your ID and insurance card.    -For patients being admitted to the [facility]  Family members are to take the patient belongings with them and place them in the lockers provided in the Family Lounge.  Please limit the items you bring to 1 bag as the lockers are small.      -If you use a CPAP machine, please bring the CPAP machine, tubing, and mask to hospital.    -If you have a Deep Brain Stimulator, Spinal Cord Stimulator, or any Neuro Stimulator device---you must bring the remote control to the hospital.      ALL PATIENTS GOING HOME THE  SAME DAY OF SURGERY ARE REQUIRED TO HAVE A RESPONSIBLE ADULT TO DRIVE AND BE IN ATTENDANCE WITH THEM FOR 24 HOURS FOLLOWING SURGERY.    Covid testing policy as of 12/06/2022  Your surgeon will notify and schedule you for a COVID test if one is needed before surgery--please direct any questions or COVID symptoms to your surgeon      Questions or Concerns:    - For any questions regarding the day of surgery or your hospital stay, please contact the Pre Admission Nursing Office at [phone].       - If you have health changes between today and your surgery, please call your surgeon.       - For questions after surgery, please call your surgeons office.           Current Visitor Guidelines    2 adult visitors for adult patients in the pre op area    If additional visitors come (beyond a patient care attendant or a group home caregiver), the additional visitors will be asked to wait in the main lobby of the hospital    Visiting hours: 8 a.m. to 8:30 p.m.    Patients confirmed or suspected to have symptoms of COVID 19 or flu:     No visitors allowed for adult patients.   Children (under age 18) can have 1 named visitor.     People who are sick or showing symptoms of COVID 19 or flu:    Are not allowed to visit patients--we can only make exceptions in special situations.       Please follow these guidelines for your visit:          Please maintain social distance          Masking is optional--however at times you may be asked to wear a mask for the safety of yourself and others     Clean your hands with alcohol hand . Do this when you arrive at and leave the building and patient room,    And again after you touch your mask or anything in the room.     Go directly to and from the room you are visiting.     Stay in the patient s room during your visit. Limit going to other places in the hospital as much as possible     Leave bags and jackets at home or in the car.     For everyone s health, please don t come and  go during your visit. That includes for smoking   during your visit.
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
## 2025-06-23 NOTE — ORAL ONC MGMT
Oral Chemotherapy Monitoring Program     Placed call to patient in follow up of oral chemotherapy. Left message requesting call back.     This call was to relay plan from Alee De Los Santos for hyperglycemia from an inbasket message.     Antonella Murillo, PharmD   Oral Chemotherapy Monitoring Program  [facility]  [phone]

## 2025-06-24 ENCOUNTER — PATIENT OUTREACH (OUTPATIENT)
Dept: CARE COORDINATION | Facility: CLINIC | Age: 50
End: 2025-06-24
Payer: MEDICARE

## 2025-06-25 ENCOUNTER — TELEPHONE (OUTPATIENT)
Dept: ONCOLOGY | Facility: CLINIC | Age: 50
End: 2025-06-25
Payer: MEDICARE

## 2025-06-25 DIAGNOSIS — E09.9 DRUG OR CHEMICAL INDUCED DIABETES MELLITUS WITHOUT COMPLICATION, WITHOUT LONG-TERM CURRENT USE OF INSULIN: Primary | ICD-10-CM

## 2025-06-25 NOTE — ORAL ONC MGMT
Oral Chemotherapy Management    I gave Teresa a call to review her recently elevated blood glucose levels as well as her dexamethasone rinse usage. Teresa states that she has only been using the dexamethasone twice daily. I recommended she start the ordered magic mouth wash if she is needing a mouth rinse more frequently. We reviewed that the diabetes education team would be reaching out to her and that we would likely be starting metformin pending discussion with the care team. Plan to follow up with lab work after metformin started and diabetes education is complete.    Kenney Burnette, PharmD  Oral Chemotherapy Monitoring Program  [facility]  [phone]

## 2025-06-26 ENCOUNTER — PATIENT OUTREACH (OUTPATIENT)
Dept: CARE COORDINATION | Facility: CLINIC | Age: 50
End: 2025-06-26
Payer: MEDICARE

## 2025-07-01 DIAGNOSIS — S32.048G OTHER CLOSED FRACTURE OF FOURTH LUMBAR VERTEBRA WITH DELAYED HEALING, SUBSEQUENT ENCOUNTER: Primary | ICD-10-CM

## 2025-07-01 RX ORDER — CEFAZOLIN SODIUM 2 G/50ML
2 SOLUTION INTRAVENOUS SEE ADMIN INSTRUCTIONS
OUTPATIENT
Start: 2025-07-01

## 2025-07-01 RX ORDER — ACETAMINOPHEN 325 MG/1
975 TABLET ORAL ONCE
OUTPATIENT
Start: 2025-07-01 | End: 2025-07-01

## 2025-07-01 RX ORDER — CEFAZOLIN SODIUM 2 G/50ML
2 SOLUTION INTRAVENOUS
OUTPATIENT
Start: 2025-07-01

## 2025-07-01 RX ORDER — GABAPENTIN 300 MG/1
300 CAPSULE ORAL
OUTPATIENT
Start: 2025-07-01

## 2025-07-07 ENCOUNTER — APPOINTMENT (OUTPATIENT)
Dept: GENERAL RADIOLOGY | Facility: CLINIC | Age: 50
DRG: 543 | End: 2025-07-07
Attending: NEUROLOGICAL SURGERY
Payer: MEDICARE

## 2025-07-07 ENCOUNTER — ANESTHESIA (OUTPATIENT)
Dept: SURGERY | Facility: CLINIC | Age: 50
End: 2025-07-07
Payer: MEDICARE

## 2025-07-07 ENCOUNTER — HOSPITAL ENCOUNTER (INPATIENT)
Facility: CLINIC | Age: 50
LOS: 8 days | Discharge: HOME | DRG: 543 | End: 2025-07-15
Attending: NEUROLOGICAL SURGERY | Admitting: NEUROLOGICAL SURGERY
Payer: MEDICARE

## 2025-07-07 DIAGNOSIS — S32.048G OTHER CLOSED FRACTURE OF FOURTH LUMBAR VERTEBRA WITH DELAYED HEALING, SUBSEQUENT ENCOUNTER: ICD-10-CM

## 2025-07-07 DIAGNOSIS — C79.51 METASTASIS TO SPINAL COLUMN (H): Primary | ICD-10-CM

## 2025-07-07 DIAGNOSIS — E11.9 NEW ONSET TYPE 2 DIABETES MELLITUS (H): ICD-10-CM

## 2025-07-07 LAB
BLD PROD TYP BPU: NORMAL
BLD PROD TYP BPU: NORMAL
BLOOD COMPONENT TYPE: NORMAL
BLOOD COMPONENT TYPE: NORMAL
CODING SYSTEM: NORMAL
CODING SYSTEM: NORMAL
CROSSMATCH: NORMAL
CROSSMATCH: NORMAL
EST. AVERAGE GLUCOSE BLD GHB EST-MCNC: 255 MG/DL
GLUCOSE BLDC GLUCOMTR-MCNC: 185 MG/DL (ref 70–99)
GLUCOSE BLDC GLUCOMTR-MCNC: 190 MG/DL (ref 70–99)
GLUCOSE BLDC GLUCOMTR-MCNC: 215 MG/DL (ref 70–99)
GLUCOSE BLDC GLUCOMTR-MCNC: 217 MG/DL (ref 70–99)
GLUCOSE BLDC GLUCOMTR-MCNC: 223 MG/DL (ref 70–99)
GLUCOSE BLDC GLUCOMTR-MCNC: 236 MG/DL (ref 70–99)
GLUCOSE BLDC GLUCOMTR-MCNC: 237 MG/DL (ref 70–99)
GLUCOSE BLDC GLUCOMTR-MCNC: 251 MG/DL (ref 70–99)
GLUCOSE BLDC GLUCOMTR-MCNC: 303 MG/DL (ref 70–99)
GLUCOSE BLDC GLUCOMTR-MCNC: 311 MG/DL (ref 70–99)
GLUCOSE BLDC GLUCOMTR-MCNC: 346 MG/DL (ref 70–99)
GLUCOSE BLDC GLUCOMTR-MCNC: 381 MG/DL (ref 70–99)
GLUCOSE BLDC GLUCOMTR-MCNC: 423 MG/DL (ref 70–99)
HBA1C MFR BLD: 10.5 %
HCG UR QL: NEGATIVE
UNIT ABO/RH: NORMAL
UNIT ABO/RH: NORMAL
UNIT NUMBER: NORMAL
UNIT NUMBER: NORMAL
UNIT STATUS: NORMAL
UNIT STATUS: NORMAL
UNIT TYPE ISBT: 8400
UNIT TYPE ISBT: 8400

## 2025-07-07 PROCEDURE — 250N000012 HC RX MED GY IP 250 OP 636 PS 637: Performed by: NURSE ANESTHETIST, CERTIFIED REGISTERED

## 2025-07-07 PROCEDURE — 250N000011 HC RX IP 250 OP 636

## 2025-07-07 PROCEDURE — 250N000013 HC RX MED GY IP 250 OP 250 PS 637: Performed by: NEUROLOGICAL SURGERY

## 2025-07-07 PROCEDURE — 250N000013 HC RX MED GY IP 250 OP 250 PS 637: Performed by: NURSE PRACTITIONER

## 2025-07-07 PROCEDURE — 250N000011 HC RX IP 250 OP 636: Performed by: STUDENT IN AN ORGANIZED HEALTH CARE EDUCATION/TRAINING PROGRAM

## 2025-07-07 PROCEDURE — 36415 COLL VENOUS BLD VENIPUNCTURE: CPT

## 2025-07-07 PROCEDURE — 250N000009 HC RX 250: Performed by: NEUROLOGICAL SURGERY

## 2025-07-07 PROCEDURE — 999N000182 XR SURGERY OARM

## 2025-07-07 PROCEDURE — 360N000086 HC SURGERY LEVEL 6 W/ FLUORO, PER MIN: Performed by: NEUROLOGICAL SURGERY

## 2025-07-07 PROCEDURE — 710N000010 HC RECOVERY PHASE 1, LEVEL 2, PER MIN: Performed by: NEUROLOGICAL SURGERY

## 2025-07-07 PROCEDURE — 999N000141 HC STATISTIC PRE-PROCEDURE NURSING ASSESSMENT: Performed by: NEUROLOGICAL SURGERY

## 2025-07-07 PROCEDURE — 272N000001 HC OR GENERAL SUPPLY STERILE: Performed by: NEUROLOGICAL SURGERY

## 2025-07-07 PROCEDURE — 81025 URINE PREGNANCY TEST: CPT | Performed by: NEUROLOGICAL SURGERY

## 2025-07-07 PROCEDURE — 250N000009 HC RX 250

## 2025-07-07 PROCEDURE — C1762 CONN TISS, HUMAN(INC FASCIA): HCPCS | Performed by: NEUROLOGICAL SURGERY

## 2025-07-07 PROCEDURE — 999N000179 XR SURGERY CARM FLUORO LESS THAN 5 MIN W STILLS

## 2025-07-07 PROCEDURE — 250N000013 HC RX MED GY IP 250 OP 250 PS 637

## 2025-07-07 PROCEDURE — 250N000013 HC RX MED GY IP 250 OP 250 PS 637: Performed by: NURSE ANESTHETIST, CERTIFIED REGISTERED

## 2025-07-07 PROCEDURE — 83036 HEMOGLOBIN GLYCOSYLATED A1C: CPT

## 2025-07-07 PROCEDURE — 8E0WXBZ COMPUTER ASSISTED PROCEDURE OF TRUNK REGION: ICD-10-PCS | Performed by: NEUROLOGICAL SURGERY

## 2025-07-07 PROCEDURE — C1713 ANCHOR/SCREW BN/BN,TIS/BN: HCPCS | Performed by: NEUROLOGICAL SURGERY

## 2025-07-07 PROCEDURE — 250N000009 HC RX 250: Performed by: NURSE ANESTHETIST, CERTIFIED REGISTERED

## 2025-07-07 PROCEDURE — 250N000012 HC RX MED GY IP 250 OP 636 PS 637: Performed by: STUDENT IN AN ORGANIZED HEALTH CARE EDUCATION/TRAINING PROGRAM

## 2025-07-07 PROCEDURE — 0SG10K1 FUSION OF 2 OR MORE LUMBAR VERTEBRAL JOINTS WITH NONAUTOLOGOUS TISSUE SUBSTITUTE, POSTERIOR APPROACH, POSTERIOR COLUMN, OPEN APPROACH: ICD-10-PCS | Performed by: NEUROLOGICAL SURGERY

## 2025-07-07 PROCEDURE — 370N000017 HC ANESTHESIA TECHNICAL FEE, PER MIN: Performed by: NEUROLOGICAL SURGERY

## 2025-07-07 PROCEDURE — 250N000011 HC RX IP 250 OP 636: Performed by: NURSE ANESTHETIST, CERTIFIED REGISTERED

## 2025-07-07 PROCEDURE — 250N000011 HC RX IP 250 OP 636: Performed by: NEUROLOGICAL SURGERY

## 2025-07-07 PROCEDURE — 86923 COMPATIBILITY TEST ELECTRIC: CPT

## 2025-07-07 PROCEDURE — 120N000003 HC R&B IMCU UMMC

## 2025-07-07 PROCEDURE — 258N000003 HC RX IP 258 OP 636: Performed by: STUDENT IN AN ORGANIZED HEALTH CARE EDUCATION/TRAINING PROGRAM

## 2025-07-07 PROCEDURE — 258N000003 HC RX IP 258 OP 636: Performed by: NURSE ANESTHETIST, CERTIFIED REGISTERED

## 2025-07-07 DEVICE — IMP ROD MEDT SOLERA CVD 5.5X60MM TI 1553201060: Type: IMPLANTABLE DEVICE | Site: SPINE LUMBAR | Status: FUNCTIONAL

## 2025-07-07 DEVICE — IMPLANTABLE DEVICE: Type: IMPLANTABLE DEVICE | Site: SPINE LUMBAR | Status: FUNCTIONAL

## 2025-07-07 DEVICE — SCREW BN PEEK SS MA STRL SPNE 5.5/6 MM ROD 559200029: Type: IMPLANTABLE DEVICE | Site: SPINE LUMBAR | Status: FUNCTIONAL

## 2025-07-07 DEVICE — BONE CHIP CANCELLOUS 30CC 100030: Type: IMPLANTABLE DEVICE | Site: SPINE LUMBAR | Status: FUNCTIONAL

## 2025-07-07 DEVICE — IMP ROD MEDT SOLERA CVD 5.5X55MM TI 1553201055: Type: IMPLANTABLE DEVICE | Site: SPINE LUMBAR | Status: FUNCTIONAL

## 2025-07-07 RX ORDER — SODIUM CHLORIDE, SODIUM LACTATE, POTASSIUM CHLORIDE, CALCIUM CHLORIDE 600; 310; 30; 20 MG/100ML; MG/100ML; MG/100ML; MG/100ML
INJECTION, SOLUTION INTRAVENOUS CONTINUOUS PRN
Status: DISCONTINUED | OUTPATIENT
Start: 2025-07-07 | End: 2025-07-07

## 2025-07-07 RX ORDER — ONDANSETRON 4 MG/1
4 TABLET, ORALLY DISINTEGRATING ORAL EVERY 30 MIN PRN
Status: DISCONTINUED | OUTPATIENT
Start: 2025-07-07 | End: 2025-07-07 | Stop reason: HOSPADM

## 2025-07-07 RX ORDER — DEXTROSE MONOHYDRATE 25 G/50ML
25-50 INJECTION, SOLUTION INTRAVENOUS
Status: DISCONTINUED | OUTPATIENT
Start: 2025-07-07 | End: 2025-07-07

## 2025-07-07 RX ORDER — AMOXICILLIN 250 MG
1 CAPSULE ORAL 2 TIMES DAILY
Status: DISCONTINUED | OUTPATIENT
Start: 2025-07-07 | End: 2025-07-07

## 2025-07-07 RX ORDER — LIDOCAINE HYDROCHLORIDE AND EPINEPHRINE 10; 10 MG/ML; UG/ML
INJECTION, SOLUTION INFILTRATION; PERINEURAL PRN
Status: DISCONTINUED | OUTPATIENT
Start: 2025-07-07 | End: 2025-07-07 | Stop reason: HOSPADM

## 2025-07-07 RX ORDER — HYDROMORPHONE HCL IN WATER/PF 6 MG/30 ML
0.4 PATIENT CONTROLLED ANALGESIA SYRINGE INTRAVENOUS EVERY 5 MIN PRN
Status: DISCONTINUED | OUTPATIENT
Start: 2025-07-07 | End: 2025-07-07 | Stop reason: HOSPADM

## 2025-07-07 RX ORDER — DEXTROSE MONOHYDRATE 100 MG/ML
INJECTION, SOLUTION INTRAVENOUS CONTINUOUS PRN
Status: DISCONTINUED | OUTPATIENT
Start: 2025-07-07 | End: 2025-07-07

## 2025-07-07 RX ORDER — ALBUTEROL SULFATE 90 UG/1
INHALANT RESPIRATORY (INHALATION) PRN
Status: DISCONTINUED | OUTPATIENT
Start: 2025-07-07 | End: 2025-07-07

## 2025-07-07 RX ORDER — OLANZAPINE 5 MG/1
5 TABLET, FILM COATED ORAL AT BEDTIME
Status: DISCONTINUED | OUTPATIENT
Start: 2025-07-07 | End: 2025-07-15 | Stop reason: HOSPADM

## 2025-07-07 RX ORDER — SODIUM CHLORIDE, SODIUM GLUCONATE, SODIUM ACETATE, POTASSIUM CHLORIDE AND MAGNESIUM CHLORIDE 526; 502; 368; 37; 30 MG/100ML; MG/100ML; MG/100ML; MG/100ML; MG/100ML
INJECTION, SOLUTION INTRAVENOUS CONTINUOUS PRN
Status: DISCONTINUED | OUTPATIENT
Start: 2025-07-07 | End: 2025-07-07

## 2025-07-07 RX ORDER — GABAPENTIN 300 MG/1
300 CAPSULE ORAL 3 TIMES DAILY
Status: DISCONTINUED | OUTPATIENT
Start: 2025-07-07 | End: 2025-07-10

## 2025-07-07 RX ORDER — PROPOFOL 10 MG/ML
INJECTION, EMULSION INTRAVENOUS CONTINUOUS PRN
Status: DISCONTINUED | OUTPATIENT
Start: 2025-07-07 | End: 2025-07-07

## 2025-07-07 RX ORDER — METHOCARBAMOL 750 MG/1
750 TABLET, FILM COATED ORAL 4 TIMES DAILY
Status: DISCONTINUED | OUTPATIENT
Start: 2025-07-08 | End: 2025-07-08

## 2025-07-07 RX ORDER — SERTRALINE HYDROCHLORIDE 100 MG/1
100 TABLET, FILM COATED ORAL EVERY MORNING
Status: DISCONTINUED | OUTPATIENT
Start: 2025-07-08 | End: 2025-07-15 | Stop reason: HOSPADM

## 2025-07-07 RX ORDER — CEFAZOLIN SODIUM/WATER 2 G/20 ML
2 SYRINGE (ML) INTRAVENOUS SEE ADMIN INSTRUCTIONS
Status: DISCONTINUED | OUTPATIENT
Start: 2025-07-07 | End: 2025-07-07 | Stop reason: HOSPADM

## 2025-07-07 RX ORDER — EVEROLIMUS 10 MG/1
10 TABLET ORAL AT BEDTIME
Status: DISCONTINUED | OUTPATIENT
Start: 2025-07-07 | End: 2025-07-15 | Stop reason: HOSPADM

## 2025-07-07 RX ORDER — LABETALOL 20 MG/4 ML (5 MG/ML) INTRAVENOUS SYRINGE
PRN
Status: DISCONTINUED | OUTPATIENT
Start: 2025-07-07 | End: 2025-07-07

## 2025-07-07 RX ORDER — NALOXONE HYDROCHLORIDE 0.4 MG/ML
0.4 INJECTION, SOLUTION INTRAMUSCULAR; INTRAVENOUS; SUBCUTANEOUS
Status: DISCONTINUED | OUTPATIENT
Start: 2025-07-07 | End: 2025-07-15 | Stop reason: HOSPADM

## 2025-07-07 RX ORDER — ONDANSETRON 2 MG/ML
4 INJECTION INTRAMUSCULAR; INTRAVENOUS EVERY 30 MIN PRN
Status: DISCONTINUED | OUTPATIENT
Start: 2025-07-07 | End: 2025-07-07 | Stop reason: HOSPADM

## 2025-07-07 RX ORDER — HYDROXYZINE HYDROCHLORIDE 25 MG/1
25 TABLET, FILM COATED ORAL 4 TIMES DAILY PRN
Status: DISCONTINUED | OUTPATIENT
Start: 2025-07-07 | End: 2025-07-08

## 2025-07-07 RX ORDER — LIDOCAINE HYDROCHLORIDE 20 MG/ML
INJECTION, SOLUTION INFILTRATION; PERINEURAL PRN
Status: DISCONTINUED | OUTPATIENT
Start: 2025-07-07 | End: 2025-07-07

## 2025-07-07 RX ORDER — POLYETHYLENE GLYCOL 3350 17 G/17G
17 POWDER, FOR SOLUTION ORAL DAILY
Status: DISCONTINUED | OUTPATIENT
Start: 2025-07-08 | End: 2025-07-07

## 2025-07-07 RX ORDER — DEXAMETHASONE SODIUM PHOSPHATE 4 MG/ML
INJECTION, SOLUTION INTRA-ARTICULAR; INTRALESIONAL; INTRAMUSCULAR; INTRAVENOUS; SOFT TISSUE PRN
Status: DISCONTINUED | OUTPATIENT
Start: 2025-07-07 | End: 2025-07-07

## 2025-07-07 RX ORDER — NICOTINE POLACRILEX 4 MG
15-30 LOZENGE BUCCAL
Status: DISCONTINUED | OUTPATIENT
Start: 2025-07-07 | End: 2025-07-07

## 2025-07-07 RX ORDER — DEXTROSE MONOHYDRATE 25 G/50ML
25-50 INJECTION, SOLUTION INTRAVENOUS
Status: DISCONTINUED | OUTPATIENT
Start: 2025-07-07 | End: 2025-07-15 | Stop reason: HOSPADM

## 2025-07-07 RX ORDER — HALOPERIDOL 5 MG/ML
1 INJECTION INTRAMUSCULAR
Status: DISCONTINUED | OUTPATIENT
Start: 2025-07-07 | End: 2025-07-07 | Stop reason: HOSPADM

## 2025-07-07 RX ORDER — NALOXONE HYDROCHLORIDE 0.4 MG/ML
0.2 INJECTION, SOLUTION INTRAMUSCULAR; INTRAVENOUS; SUBCUTANEOUS
Status: DISCONTINUED | OUTPATIENT
Start: 2025-07-07 | End: 2025-07-15 | Stop reason: HOSPADM

## 2025-07-07 RX ORDER — METHOCARBAMOL 500 MG/1
500 TABLET, FILM COATED ORAL ONCE
Status: COMPLETED | OUTPATIENT
Start: 2025-07-07 | End: 2025-07-07

## 2025-07-07 RX ORDER — NICOTINE POLACRILEX 4 MG
15-30 LOZENGE BUCCAL
Status: DISCONTINUED | OUTPATIENT
Start: 2025-07-07 | End: 2025-07-15 | Stop reason: HOSPADM

## 2025-07-07 RX ORDER — AMOXICILLIN 250 MG
2 CAPSULE ORAL 2 TIMES DAILY
Status: DISCONTINUED | OUTPATIENT
Start: 2025-07-07 | End: 2025-07-09

## 2025-07-07 RX ORDER — VITAMIN B COMPLEX
25 TABLET ORAL DAILY
Status: DISCONTINUED | OUTPATIENT
Start: 2025-07-08 | End: 2025-07-15 | Stop reason: HOSPADM

## 2025-07-07 RX ORDER — DEXAMETHASONE SODIUM PHOSPHATE 4 MG/ML
4 INJECTION, SOLUTION INTRA-ARTICULAR; INTRALESIONAL; INTRAMUSCULAR; INTRAVENOUS; SOFT TISSUE
Status: DISCONTINUED | OUTPATIENT
Start: 2025-07-07 | End: 2025-07-07 | Stop reason: HOSPADM

## 2025-07-07 RX ORDER — ONDANSETRON 4 MG/1
4 TABLET, ORALLY DISINTEGRATING ORAL EVERY 6 HOURS PRN
Status: DISCONTINUED | OUTPATIENT
Start: 2025-07-07 | End: 2025-07-15 | Stop reason: HOSPADM

## 2025-07-07 RX ORDER — MAGNESIUM SULFATE HEPTAHYDRATE 40 MG/ML
2 INJECTION, SOLUTION INTRAVENOUS ONCE
Status: COMPLETED | OUTPATIENT
Start: 2025-07-07 | End: 2025-07-07

## 2025-07-07 RX ORDER — QUETIAPINE FUMARATE 100 MG/1
400 TABLET, FILM COATED ORAL AT BEDTIME
Status: DISCONTINUED | OUTPATIENT
Start: 2025-07-07 | End: 2025-07-07

## 2025-07-07 RX ORDER — AMOXICILLIN 250 MG
1 CAPSULE ORAL 2 TIMES DAILY
Status: DISCONTINUED | OUTPATIENT
Start: 2025-07-07 | End: 2025-07-08

## 2025-07-07 RX ORDER — PROCHLORPERAZINE MALEATE 10 MG
10 TABLET ORAL EVERY 6 HOURS PRN
Status: DISCONTINUED | OUTPATIENT
Start: 2025-07-07 | End: 2025-07-15 | Stop reason: HOSPADM

## 2025-07-07 RX ORDER — PROPOFOL 10 MG/ML
INJECTION, EMULSION INTRAVENOUS PRN
Status: DISCONTINUED | OUTPATIENT
Start: 2025-07-07 | End: 2025-07-07

## 2025-07-07 RX ORDER — ACETAMINOPHEN 325 MG/1
975 TABLET ORAL EVERY 8 HOURS
Status: DISCONTINUED | OUTPATIENT
Start: 2025-07-07 | End: 2025-07-15 | Stop reason: HOSPADM

## 2025-07-07 RX ORDER — FAMOTIDINE 20 MG/1
20 TABLET, FILM COATED ORAL 2 TIMES DAILY
Status: DISCONTINUED | OUTPATIENT
Start: 2025-07-07 | End: 2025-07-15 | Stop reason: HOSPADM

## 2025-07-07 RX ORDER — SODIUM CHLORIDE 9 MG/ML
INJECTION, SOLUTION INTRAVENOUS CONTINUOUS
Status: ACTIVE | OUTPATIENT
Start: 2025-07-07 | End: 2025-07-08

## 2025-07-07 RX ORDER — HYDROMORPHONE HCL IN WATER/PF 6 MG/30 ML
0.2 PATIENT CONTROLLED ANALGESIA SYRINGE INTRAVENOUS
Status: DISCONTINUED | OUTPATIENT
Start: 2025-07-07 | End: 2025-07-07

## 2025-07-07 RX ORDER — FENTANYL CITRATE 50 UG/ML
50 INJECTION, SOLUTION INTRAMUSCULAR; INTRAVENOUS EVERY 5 MIN PRN
Status: DISCONTINUED | OUTPATIENT
Start: 2025-07-07 | End: 2025-07-07 | Stop reason: HOSPADM

## 2025-07-07 RX ORDER — FENTANYL CITRATE 50 UG/ML
25 INJECTION, SOLUTION INTRAMUSCULAR; INTRAVENOUS EVERY 5 MIN PRN
Status: DISCONTINUED | OUTPATIENT
Start: 2025-07-07 | End: 2025-07-07 | Stop reason: HOSPADM

## 2025-07-07 RX ORDER — GABAPENTIN 300 MG/1
300 CAPSULE ORAL
Status: COMPLETED | OUTPATIENT
Start: 2025-07-07 | End: 2025-07-07

## 2025-07-07 RX ORDER — NALOXONE HYDROCHLORIDE 0.4 MG/ML
0.1 INJECTION, SOLUTION INTRAMUSCULAR; INTRAVENOUS; SUBCUTANEOUS
Status: DISCONTINUED | OUTPATIENT
Start: 2025-07-07 | End: 2025-07-07 | Stop reason: HOSPADM

## 2025-07-07 RX ORDER — ONDANSETRON 2 MG/ML
4 INJECTION INTRAMUSCULAR; INTRAVENOUS EVERY 6 HOURS PRN
Status: DISCONTINUED | OUTPATIENT
Start: 2025-07-07 | End: 2025-07-10

## 2025-07-07 RX ORDER — LIDOCAINE 4 G/G
2 PATCH TOPICAL
Status: DISCONTINUED | OUTPATIENT
Start: 2025-07-07 | End: 2025-07-15 | Stop reason: HOSPADM

## 2025-07-07 RX ORDER — HYDROMORPHONE HYDROCHLORIDE 1 MG/ML
0.5 INJECTION, SOLUTION INTRAMUSCULAR; INTRAVENOUS; SUBCUTANEOUS
Status: DISCONTINUED | OUTPATIENT
Start: 2025-07-07 | End: 2025-07-07

## 2025-07-07 RX ORDER — HYDROMORPHONE HCL IN WATER/PF 6 MG/30 ML
0.2 PATIENT CONTROLLED ANALGESIA SYRINGE INTRAVENOUS EVERY 5 MIN PRN
Status: DISCONTINUED | OUTPATIENT
Start: 2025-07-07 | End: 2025-07-07 | Stop reason: HOSPADM

## 2025-07-07 RX ORDER — CEFAZOLIN SODIUM 2 G/50ML
2 SOLUTION INTRAVENOUS EVERY 8 HOURS
Status: DISCONTINUED | OUTPATIENT
Start: 2025-07-08 | End: 2025-07-09

## 2025-07-07 RX ORDER — LIDOCAINE 40 MG/G
CREAM TOPICAL
Status: DISCONTINUED | OUTPATIENT
Start: 2025-07-07 | End: 2025-07-15 | Stop reason: HOSPADM

## 2025-07-07 RX ORDER — HYDROXYZINE HYDROCHLORIDE 25 MG/1
25 TABLET, FILM COATED ORAL EVERY 6 HOURS PRN
Status: DISCONTINUED | OUTPATIENT
Start: 2025-07-07 | End: 2025-07-07 | Stop reason: HOSPADM

## 2025-07-07 RX ORDER — KETAMINE HYDROCHLORIDE 10 MG/ML
INJECTION INTRAMUSCULAR; INTRAVENOUS PRN
Status: DISCONTINUED | OUTPATIENT
Start: 2025-07-07 | End: 2025-07-07

## 2025-07-07 RX ORDER — ONDANSETRON 2 MG/ML
INJECTION INTRAMUSCULAR; INTRAVENOUS PRN
Status: DISCONTINUED | OUTPATIENT
Start: 2025-07-07 | End: 2025-07-07

## 2025-07-07 RX ORDER — CEFAZOLIN SODIUM/WATER 2 G/20 ML
2 SYRINGE (ML) INTRAVENOUS
Status: COMPLETED | OUTPATIENT
Start: 2025-07-07 | End: 2025-07-07

## 2025-07-07 RX ORDER — DEXTROSE MONOHYDRATE 100 MG/ML
INJECTION, SOLUTION INTRAVENOUS CONTINUOUS PRN
Status: DISCONTINUED | OUTPATIENT
Start: 2025-07-07 | End: 2025-07-15 | Stop reason: HOSPADM

## 2025-07-07 RX ORDER — POLYETHYLENE GLYCOL 3350 17 G/17G
17 POWDER, FOR SOLUTION ORAL DAILY
Status: DISCONTINUED | OUTPATIENT
Start: 2025-07-08 | End: 2025-07-08

## 2025-07-07 RX ORDER — ACETAMINOPHEN 325 MG/1
975 TABLET ORAL ONCE
Status: DISCONTINUED | OUTPATIENT
Start: 2025-07-07 | End: 2025-07-07 | Stop reason: HOSPADM

## 2025-07-07 RX ORDER — SODIUM CHLORIDE, SODIUM LACTATE, POTASSIUM CHLORIDE, CALCIUM CHLORIDE 600; 310; 30; 20 MG/100ML; MG/100ML; MG/100ML; MG/100ML
INJECTION, SOLUTION INTRAVENOUS CONTINUOUS
Status: DISCONTINUED | OUTPATIENT
Start: 2025-07-07 | End: 2025-07-07 | Stop reason: HOSPADM

## 2025-07-07 RX ORDER — FENTANYL CITRATE 50 UG/ML
INJECTION, SOLUTION INTRAMUSCULAR; INTRAVENOUS PRN
Status: DISCONTINUED | OUTPATIENT
Start: 2025-07-07 | End: 2025-07-07

## 2025-07-07 RX ORDER — ACETAMINOPHEN 325 MG/1
975 TABLET ORAL ONCE
Status: COMPLETED | OUTPATIENT
Start: 2025-07-07 | End: 2025-07-07

## 2025-07-07 RX ORDER — BISACODYL 10 MG
10 SUPPOSITORY, RECTAL RECTAL DAILY PRN
Status: DISCONTINUED | OUTPATIENT
Start: 2025-07-10 | End: 2025-07-15 | Stop reason: HOSPADM

## 2025-07-07 RX ORDER — DIMENHYDRINATE 50 MG/ML
25 INJECTION, SOLUTION INTRAMUSCULAR; INTRAVENOUS
Status: DISCONTINUED | OUTPATIENT
Start: 2025-07-07 | End: 2025-07-07 | Stop reason: HOSPADM

## 2025-07-07 RX ORDER — OXYCODONE HYDROCHLORIDE 10 MG/1
10 TABLET ORAL
Status: DISCONTINUED | OUTPATIENT
Start: 2025-07-07 | End: 2025-07-09

## 2025-07-07 RX ORDER — QUETIAPINE FUMARATE 100 MG/1
100 TABLET, FILM COATED ORAL AT BEDTIME
Status: DISCONTINUED | OUTPATIENT
Start: 2025-07-07 | End: 2025-07-07

## 2025-07-07 RX ADMIN — ONDANSETRON 4 MG: 2 INJECTION INTRAMUSCULAR; INTRAVENOUS at 15:38

## 2025-07-07 RX ADMIN — PROPOFOL 100 MCG/KG/MIN: 10 INJECTION, EMULSION INTRAVENOUS at 12:30

## 2025-07-07 RX ADMIN — Medication 2 G: at 12:33

## 2025-07-07 RX ADMIN — FENTANYL CITRATE 100 MCG: 50 INJECTION INTRAMUSCULAR; INTRAVENOUS at 12:28

## 2025-07-07 RX ADMIN — LIDOCAINE 2 PATCH: 4 PATCH TOPICAL at 21:18

## 2025-07-07 RX ADMIN — ACETAMINOPHEN 975 MG: 325 TABLET ORAL at 21:22

## 2025-07-07 RX ADMIN — INSULIN HUMAN 5 UNITS: 100 INJECTION, SOLUTION PARENTERAL at 14:35

## 2025-07-07 RX ADMIN — HYDROMORPHONE HYDROCHLORIDE 0.4 MG: 0.2 INJECTION, SOLUTION INTRAMUSCULAR; INTRAVENOUS; SUBCUTANEOUS at 18:18

## 2025-07-07 RX ADMIN — LABETALOL 20 MG/4 ML (5 MG/ML) INTRAVENOUS SYRINGE 10 MG: at 16:41

## 2025-07-07 RX ADMIN — Medication 10 MG: at 14:38

## 2025-07-07 RX ADMIN — METHOCARBAMOL 500 MG: 500 TABLET ORAL at 18:39

## 2025-07-07 RX ADMIN — SUCCINYLCHOLINE CHLORIDE 120 MG: 20 INJECTION, SOLUTION INTRAMUSCULAR; INTRAVENOUS; PARENTERAL at 12:30

## 2025-07-07 RX ADMIN — DEXAMETHASONE SODIUM PHOSPHATE 4 MG: 4 INJECTION, SOLUTION INTRAMUSCULAR; INTRAVENOUS at 13:12

## 2025-07-07 RX ADMIN — FAMOTIDINE 20 MG: 20 TABLET, FILM COATED ORAL at 21:21

## 2025-07-07 RX ADMIN — SUFENTANIL CITRATE 0.7 MCG/KG/HR: 50 INJECTION, SOLUTION EPIDURAL; INTRAVENOUS at 12:28

## 2025-07-07 RX ADMIN — PHENYLEPHRINE HYDROCHLORIDE 100 MCG: 10 INJECTION INTRAVENOUS at 13:11

## 2025-07-07 RX ADMIN — SODIUM CHLORIDE, SODIUM GLUCONATE, SODIUM ACETATE, POTASSIUM CHLORIDE AND MAGNESIUM CHLORIDE: 526; 502; 368; 37; 30 INJECTION, SOLUTION INTRAVENOUS at 13:48

## 2025-07-07 RX ADMIN — GABAPENTIN 300 MG: 300 CAPSULE ORAL at 21:21

## 2025-07-07 RX ADMIN — OLANZAPINE 5 MG: 5 TABLET, FILM COATED ORAL at 21:22

## 2025-07-07 RX ADMIN — Medication 50 MG: at 12:28

## 2025-07-07 RX ADMIN — PROPOFOL 150 MG: 10 INJECTION, EMULSION INTRAVENOUS at 12:30

## 2025-07-07 RX ADMIN — FENTANYL CITRATE 50 MCG: 50 INJECTION, SOLUTION INTRAMUSCULAR; INTRAVENOUS at 17:41

## 2025-07-07 RX ADMIN — FENTANYL CITRATE 50 MCG: 50 INJECTION, SOLUTION INTRAMUSCULAR; INTRAVENOUS at 17:23

## 2025-07-07 RX ADMIN — INSULIN ASPART 5 UNITS: 100 INJECTION, SOLUTION INTRAVENOUS; SUBCUTANEOUS at 11:12

## 2025-07-07 RX ADMIN — GABAPENTIN 300 MG: 300 CAPSULE ORAL at 10:42

## 2025-07-07 RX ADMIN — Medication: at 19:25

## 2025-07-07 RX ADMIN — PHENYLEPHRINE HYDROCHLORIDE 0.3 MCG/KG/MIN: 10 INJECTION INTRAVENOUS at 12:58

## 2025-07-07 RX ADMIN — LIDOCAINE HYDROCHLORIDE 100 MG: 20 INJECTION, SOLUTION INFILTRATION; PERINEURAL at 12:28

## 2025-07-07 RX ADMIN — HYDROMORPHONE HYDROCHLORIDE 0.4 MG: 0.2 INJECTION, SOLUTION INTRAMUSCULAR; INTRAVENOUS; SUBCUTANEOUS at 18:03

## 2025-07-07 RX ADMIN — SENNOSIDES AND DOCUSATE SODIUM 1 TABLET: 8.6; 5 TABLET ORAL at 21:22

## 2025-07-07 RX ADMIN — ALBUTEROL SULFATE 6 PUFF: 108 INHALANT RESPIRATORY (INHALATION) at 12:52

## 2025-07-07 RX ADMIN — SODIUM CHLORIDE, SODIUM LACTATE, POTASSIUM CHLORIDE, AND CALCIUM CHLORIDE: .6; .31; .03; .02 INJECTION, SOLUTION INTRAVENOUS at 12:19

## 2025-07-07 RX ADMIN — Medication 10 MG: at 19:04

## 2025-07-07 RX ADMIN — ACETAMINOPHEN 975 MG: 325 TABLET ORAL at 10:42

## 2025-07-07 RX ADMIN — LABETALOL 20 MG/4 ML (5 MG/ML) INTRAVENOUS SYRINGE 10 MG: at 16:26

## 2025-07-07 RX ADMIN — OXYCODONE HYDROCHLORIDE 10 MG: 10 TABLET ORAL at 18:32

## 2025-07-07 RX ADMIN — MAGNESIUM SULFATE HEPTAHYDRATE 2 G: 2 INJECTION, SOLUTION INTRAVENOUS at 12:44

## 2025-07-07 RX ADMIN — PHENYLEPHRINE HYDROCHLORIDE 100 MCG: 10 INJECTION INTRAVENOUS at 12:59

## 2025-07-07 RX ADMIN — HYDROMORPHONE HYDROCHLORIDE 0.5 MG: 1 INJECTION, SOLUTION INTRAMUSCULAR; INTRAVENOUS; SUBCUTANEOUS at 16:34

## 2025-07-07 RX ADMIN — MIDAZOLAM 2 MG: 1 INJECTION INTRAMUSCULAR; INTRAVENOUS at 12:17

## 2025-07-07 RX ADMIN — INSULIN HUMAN 5 UNITS/HR: 1 INJECTION, SOLUTION INTRAVENOUS at 13:45

## 2025-07-07 RX ADMIN — Medication 10 MG: at 13:46

## 2025-07-07 RX ADMIN — Medication 2 G: at 16:31

## 2025-07-07 ASSESSMENT — ACTIVITIES OF DAILY LIVING (ADL)
ADLS_ACUITY_SCORE: 38
ADLS_ACUITY_SCORE: 33
ADLS_ACUITY_SCORE: 38
ADLS_ACUITY_SCORE: 33

## 2025-07-07 ASSESSMENT — LIFESTYLE VARIABLES: TOBACCO_USE: 1

## 2025-07-07 ASSESSMENT — ENCOUNTER SYMPTOMS
SEIZURES: 0
ORTHOPNEA: 0

## 2025-07-07 NOTE — BRIEF OP NOTE
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 Screws placed.  Complications: None.  Implants:   Implant Name Type Inv. Item Serial No.  Lot No. LRB No. Used Action   SHANK SCR 55MM 5.5MM CD HZN MODULEX OSTEOGRIP JULIA SPNE STRL - [id number] Metallic Hardware/[place] SHANK SCR 55MM 5.5MM CD HZN MODULEX OSTEOGRIP JULIA SPNE STRL  [employer] [id number] N/A 1 Implanted   SCREW BN PEEK SS MA STRL SPNE 5.5/6 MM QUETA [id number] - [id number] Metallic Hardware/[place] SCREW BN PEEK SS MA STRL SPNE 5.5/6 MM QUETA [id number]  [employer] [id number] N/A 1 Implanted   SCREW BN SS MA ST STRL SPNE 5.5/6 MM QUETA [id number] - [id number] Metallic Hardware/[place] SCREW BN SS MA ST STRL SPNE 5.5/6 MM QUETA [id number]  [employer] [id number] N/A 1 Implanted   SCREW BN SS MA ST STRL SPNE 5.5/6 MM QUETA [id number] - [id number] Metallic Hardware/[place] SCREW BN SS MA ST STRL SPNE 5.5/6 MM QUETA [id number]  [employer] [id number] N/A 1 Implanted   SCREW BN 55MM 6.5MM SHNK JULIA OSTEOGRIP SPNE - [id number] Metallic Hardware/[place] SCREW BN 55MM 6.5MM SHNK JULIA OSTEOGRIP SPNE  [employer] [id number] N/A 1 Implanted   SCREW BN 50MM 5.5MM SHNK JULIA OSTEOGRIP SPNE - [id number] Metallic Hardware/[place] SCREW BN 50MM 5.5MM SHNK JULIA OSTEOGRIP SPNE  [employer] [id number] N/A 1 Implanted   SHANK SCR 35MM 6.5MM OSTEOGRIP JULIA NE - [id number] Metallic Hardware/[place] SHANK SCR 35MM 6.5MM OSTEOGRIP JULIA SPNE  [employer]  INC [id number] N/A 1 Implanted   SHANK SCR 35MM 6.5MM OSTEOGRIP JULIA SPNE - [id number] Metallic Hardware/[place] SHANK SCR 35MM 6.5MM OSTEOGRIP JULIA SPNE  [employer] [id number] N/A 1 Implanted   SCREW BN 55MM 6.5MM SHNK JULIA OSTEOGRIP SPNE - [id number] Metallic Hardware/[place] SCREW BN 55MM 6.5MM SHNK JULIA OSTEOGRIP SPNE  [employer] [id number] N/A 1 Implanted   IMP QUETA MEDT SOLERA CVD 5.5X55MM TI [id number] - SNA Metallic Hardware/[place] IMP QUETA MEDT SOLERA CVD 5.5X55MM TI [id number] NA [employer]  N/A 1 Implanted   IMP QUETA MEDT SOLERA CVD 5.5X60MM TI [id number] - SNA Metallic Hardware/[place] IMP QUETA MEDT SOLERA CVD 5.5X60MM TI [id number] NA [employer]  N/A 1 Implanted   BONE CHIP CANCELLOUS 30CC 670010 - [id number] Bone/Tissue/Biologic BONE CHIP CANCELLOUS 30CC [id number]    N/A 1 Implanted
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
## 2025-07-07 NOTE — ANESTHESIA PROCEDURE NOTES
Airway       Patient location during procedure: OR       Procedure Start/Stop Times: 7/7/2025 12:31 PM  Staff -        Anesthesiologist:  Abida Gunter MD       CRNA: Jenny Colvin APRN CRNA       Performed By: CRNA  Consent for Airway        Urgency: elective  Indications and Patient Condition       Indications for airway management: linda-procedural       Induction type:intravenous       Mask difficulty assessment: 1 - vent by mask    Final Airway Details       Final airway type: endotracheal airway       Successful airway: ETT - single  Endotracheal Airway Details        ETT size (mm): 7.5       Cuffed: yes       Cuff volume (mL): 8       Successful intubation technique: direct laryngoscopy       Grade View of Cords: 1       Adjucts: stylet       Position: Right       Measured from: gums/teeth       Secured at (cm): 22       Bite block used: None    Post intubation assessment        Placement verified by: capnometry, equal breath sounds and chest rise        Number of attempts at approach: 1       Secured with: tape       Ease of procedure: easy       Dentition: Intact    Medication(s) Administered   Medication Administration Time: 7/7/2025 12:31 PM

## 2025-07-07 NOTE — ANESTHESIA POSTPROCEDURE EVALUATION
Patient: Teresa Sanderson    Procedure: Procedure(s):  o-arm/stealth assisted Lumbar 3-5 fusion with pedicle screws       Anesthesia Type:  General    Note:  Disposition: Admission   Postop Pain Control: Uneventful            Sign Out: Well controlled pain   PONV: No   Neuro/Psych: Uneventful            Sign Out: Acceptable/Baseline neuro status   Airway/Respiratory: Uneventful            Sign Out: Acceptable/Baseline resp. status   CV/Hemodynamics: Uneventful            Sign Out: Acceptable CV status; No obvious hypovolemia; No obvious fluid overload   Other NRE: NONE   DID A NON-ROUTINE EVENT OCCUR? No           Last vitals:  Vitals Value Taken Time   /86 07/07/25 17:15   Temp 36.7  C (98.1  F) 07/07/25 17:00   Pulse 82 07/07/25 17:20   Resp 17 07/07/25 17:00   SpO2 96 % 07/07/25 17:20   Vitals shown include unfiled device data.    Electronically Signed By: Tasha Lucas DO  July 7, 2025  5:21 PM

## 2025-07-07 NOTE — ANESTHESIA CARE TRANSFER NOTE
Patient: Teresa Sanderson    Procedure: Procedure(s):  o-arm/stealth assisted Lumbar 3-5 fusion with pedicle screws       Diagnosis: Pathological fracture of vertebra due to malignant neoplasm metastatic to bone (H) [M84.58XA, C79.51]  Diagnosis Additional Information: No value filed.    Anesthesia Type:   General     Note:    Oropharynx: oropharynx clear of all foreign objects and spontaneously breathing  Level of Consciousness: drowsy  Oxygen Supplementation: face mask  Level of Supplemental Oxygen (L/min / FiO2): 5  Independent Airway: airway patency satisfactory and stable  Dentition: dentition unchanged  Vital Signs Stable: post-procedure vital signs reviewed and stable  Report to RN Given: handoff report given  Patient transferred to: PACU    Handoff Report: Identifed the Patient, Identified the Reponsible Provider, Reviewed the pertinent medical history, Discussed the surgical course, Reviewed Intra-OP anesthesia mangement and issues during anesthesia, Set expectations for post-procedure period and Allowed opportunity for questions and acknowledgement of understanding      Vitals:  Vitals Value Taken Time   /105 07/07/25 17:02   Temp     Pulse 82 07/07/25 17:05   Resp 12    SpO2 100 % 07/07/25 17:05   Vitals shown include unfiled device data.    Electronically Signed By: RENO Koch CRNA  July 7, 2025  5:05 PM

## 2025-07-08 ENCOUNTER — APPOINTMENT (OUTPATIENT)
Dept: PHYSICAL THERAPY | Facility: CLINIC | Age: 50
DRG: 543 | End: 2025-07-08
Attending: NURSE PRACTITIONER
Payer: MEDICARE

## 2025-07-08 LAB
ANION GAP SERPL CALCULATED.3IONS-SCNC: 14 MMOL/L (ref 7–15)
BUN SERPL-MCNC: 8.7 MG/DL (ref 6–20)
CALCIUM SERPL-MCNC: 10 MG/DL (ref 8.8–10.4)
CHLORIDE SERPL-SCNC: 104 MMOL/L (ref 98–107)
CREAT SERPL-MCNC: 0.78 MG/DL (ref 0.51–0.95)
EGFRCR SERPLBLD CKD-EPI 2021: >90 ML/MIN/1.73M2
ERYTHROCYTE [DISTWIDTH] IN BLOOD BY AUTOMATED COUNT: 14.6 % (ref 10–15)
GLUCOSE BLDC GLUCOMTR-MCNC: 115 MG/DL (ref 70–99)
GLUCOSE BLDC GLUCOMTR-MCNC: 117 MG/DL (ref 70–99)
GLUCOSE BLDC GLUCOMTR-MCNC: 124 MG/DL (ref 70–99)
GLUCOSE BLDC GLUCOMTR-MCNC: 143 MG/DL (ref 70–99)
GLUCOSE BLDC GLUCOMTR-MCNC: 144 MG/DL (ref 70–99)
GLUCOSE BLDC GLUCOMTR-MCNC: 152 MG/DL (ref 70–99)
GLUCOSE BLDC GLUCOMTR-MCNC: 153 MG/DL (ref 70–99)
GLUCOSE BLDC GLUCOMTR-MCNC: 153 MG/DL (ref 70–99)
GLUCOSE BLDC GLUCOMTR-MCNC: 159 MG/DL (ref 70–99)
GLUCOSE BLDC GLUCOMTR-MCNC: 161 MG/DL (ref 70–99)
GLUCOSE BLDC GLUCOMTR-MCNC: 166 MG/DL (ref 70–99)
GLUCOSE BLDC GLUCOMTR-MCNC: 167 MG/DL (ref 70–99)
GLUCOSE BLDC GLUCOMTR-MCNC: 167 MG/DL (ref 70–99)
GLUCOSE BLDC GLUCOMTR-MCNC: 171 MG/DL (ref 70–99)
GLUCOSE BLDC GLUCOMTR-MCNC: 178 MG/DL (ref 70–99)
GLUCOSE BLDC GLUCOMTR-MCNC: 200 MG/DL (ref 70–99)
GLUCOSE BLDC GLUCOMTR-MCNC: 215 MG/DL (ref 70–99)
GLUCOSE BLDC GLUCOMTR-MCNC: 232 MG/DL (ref 70–99)
GLUCOSE BLDC GLUCOMTR-MCNC: 247 MG/DL (ref 70–99)
GLUCOSE BLDC GLUCOMTR-MCNC: 264 MG/DL (ref 70–99)
GLUCOSE BLDC GLUCOMTR-MCNC: 275 MG/DL (ref 70–99)
GLUCOSE BLDC GLUCOMTR-MCNC: 351 MG/DL (ref 70–99)
GLUCOSE BLDC GLUCOMTR-MCNC: 392 MG/DL (ref 70–99)
GLUCOSE SERPL-MCNC: 124 MG/DL (ref 70–99)
HCO3 SERPL-SCNC: 21 MMOL/L (ref 22–29)
HCT VFR BLD AUTO: 29.2 % (ref 35–47)
HGB BLD-MCNC: 9.3 G/DL (ref 11.7–15.7)
MAGNESIUM SERPL-MCNC: 2 MG/DL (ref 1.7–2.3)
MCH RBC QN AUTO: 26.9 PG (ref 26.5–33)
MCHC RBC AUTO-ENTMCNC: 31.8 G/DL (ref 31.5–36.5)
MCV RBC AUTO: 84 FL (ref 78–100)
PHOSPHATE SERPL-MCNC: 5.4 MG/DL (ref 2.5–4.5)
PLATELET # BLD AUTO: 225 10E3/UL (ref 150–450)
POTASSIUM SERPL-SCNC: 4.2 MMOL/L (ref 3.4–5.3)
RBC # BLD AUTO: 3.46 10E6/UL (ref 3.8–5.2)
SODIUM SERPL-SCNC: 139 MMOL/L (ref 135–145)
WBC # BLD AUTO: 7.7 10E3/UL (ref 4–11)

## 2025-07-08 PROCEDURE — 250N000012 HC RX MED GY IP 250 OP 636 PS 637

## 2025-07-08 PROCEDURE — 999N000248 HC STATISTIC IV INSERT WITH US BY RN

## 2025-07-08 PROCEDURE — 36415 COLL VENOUS BLD VENIPUNCTURE: CPT

## 2025-07-08 PROCEDURE — 250N000013 HC RX MED GY IP 250 OP 250 PS 637: Performed by: NURSE PRACTITIONER

## 2025-07-08 PROCEDURE — 85027 COMPLETE CBC AUTOMATED: CPT

## 2025-07-08 PROCEDURE — 97530 THERAPEUTIC ACTIVITIES: CPT | Mod: GP | Performed by: PHYSICAL THERAPIST

## 2025-07-08 PROCEDURE — 97162 PT EVAL MOD COMPLEX 30 MIN: CPT | Mod: GP | Performed by: PHYSICAL THERAPIST

## 2025-07-08 PROCEDURE — 80048 BASIC METABOLIC PNL TOTAL CA: CPT

## 2025-07-08 PROCEDURE — 250N000011 HC RX IP 250 OP 636

## 2025-07-08 PROCEDURE — 84100 ASSAY OF PHOSPHORUS: CPT

## 2025-07-08 PROCEDURE — 83735 ASSAY OF MAGNESIUM: CPT

## 2025-07-08 PROCEDURE — 120N000002 HC R&B MED SURG/OB UMMC

## 2025-07-08 PROCEDURE — 250N000013 HC RX MED GY IP 250 OP 250 PS 637

## 2025-07-08 PROCEDURE — 97116 GAIT TRAINING THERAPY: CPT | Mod: GP | Performed by: PHYSICAL THERAPIST

## 2025-07-08 PROCEDURE — 99222 1ST HOSP IP/OBS MODERATE 55: CPT | Performed by: ANESTHESIOLOGY

## 2025-07-08 PROCEDURE — 250N000009 HC RX 250

## 2025-07-08 PROCEDURE — 99222 1ST HOSP IP/OBS MODERATE 55: CPT | Mod: 4MD

## 2025-07-08 RX ORDER — HYDROXYZINE HYDROCHLORIDE 50 MG/1
50 TABLET, FILM COATED ORAL 4 TIMES DAILY PRN
Status: DISCONTINUED | OUTPATIENT
Start: 2025-07-08 | End: 2025-07-15 | Stop reason: HOSPADM

## 2025-07-08 RX ORDER — DIAZEPAM 5 MG/1
5 TABLET ORAL EVERY 6 HOURS PRN
Status: DISCONTINUED | OUTPATIENT
Start: 2025-07-08 | End: 2025-07-09

## 2025-07-08 RX ORDER — HYDROMORPHONE HCL IN WATER/PF 6 MG/30 ML
0.2 PATIENT CONTROLLED ANALGESIA SYRINGE INTRAVENOUS
Status: DISCONTINUED | OUTPATIENT
Start: 2025-07-08 | End: 2025-07-09

## 2025-07-08 RX ORDER — METHOCARBAMOL 500 MG/1
1000 TABLET, FILM COATED ORAL 4 TIMES DAILY
Status: DISCONTINUED | OUTPATIENT
Start: 2025-07-08 | End: 2025-07-09

## 2025-07-08 RX ORDER — POLYETHYLENE GLYCOL 3350 17 G/17G
17 POWDER, FOR SOLUTION ORAL 3 TIMES DAILY
Status: DISCONTINUED | OUTPATIENT
Start: 2025-07-08 | End: 2025-07-09

## 2025-07-08 RX ADMIN — BUPRENORPHINE HYDROCHLORIDE 600 MCG: 600 FILM, SOLUBLE BUCCAL at 20:03

## 2025-07-08 RX ADMIN — OXYCODONE HYDROCHLORIDE 15 MG: 5 TABLET ORAL at 18:06

## 2025-07-08 RX ADMIN — GABAPENTIN 300 MG: 300 CAPSULE ORAL at 20:04

## 2025-07-08 RX ADMIN — ACETAMINOPHEN 975 MG: 325 TABLET ORAL at 04:50

## 2025-07-08 RX ADMIN — FAMOTIDINE 20 MG: 20 TABLET, FILM COATED ORAL at 07:57

## 2025-07-08 RX ADMIN — GABAPENTIN 300 MG: 300 CAPSULE ORAL at 15:07

## 2025-07-08 RX ADMIN — POLYETHYLENE GLYCOL 3350 17 G: 17 POWDER, FOR SOLUTION ORAL at 15:12

## 2025-07-08 RX ADMIN — Medication 25 MCG: at 07:58

## 2025-07-08 RX ADMIN — METHOCARBAMOL 750 MG: 750 TABLET ORAL at 01:00

## 2025-07-08 RX ADMIN — BUPRENORPHINE HYDROCHLORIDE 600 MCG: 600 FILM, SOLUBLE BUCCAL at 08:01

## 2025-07-08 RX ADMIN — METHOCARBAMOL 1000 MG: 500 TABLET ORAL at 20:03

## 2025-07-08 RX ADMIN — HYDROXYZINE HYDROCHLORIDE 25 MG: 25 TABLET, FILM COATED ORAL at 04:57

## 2025-07-08 RX ADMIN — SERTRALINE HYDROCHLORIDE 100 MG: 100 TABLET ORAL at 10:16

## 2025-07-08 RX ADMIN — OXYCODONE HYDROCHLORIDE 15 MG: 5 TABLET ORAL at 15:06

## 2025-07-08 RX ADMIN — INSULIN HUMAN 12 UNITS/HR: 1 INJECTION, SOLUTION INTRAVENOUS at 10:19

## 2025-07-08 RX ADMIN — LIDOCAINE 2 PATCH: 4 PATCH TOPICAL at 20:04

## 2025-07-08 RX ADMIN — GABAPENTIN 300 MG: 300 CAPSULE ORAL at 07:57

## 2025-07-08 RX ADMIN — CEFAZOLIN SODIUM 2 G: 2 SOLUTION INTRAVENOUS at 10:04

## 2025-07-08 RX ADMIN — OXYCODONE HYDROCHLORIDE 10 MG: 10 TABLET ORAL at 12:01

## 2025-07-08 RX ADMIN — ACETAMINOPHEN 975 MG: 325 TABLET ORAL at 20:03

## 2025-07-08 RX ADMIN — METHOCARBAMOL 750 MG: 750 TABLET ORAL at 07:57

## 2025-07-08 RX ADMIN — SENNOSIDES AND DOCUSATE SODIUM 2 TABLET: 50; 8.6 TABLET ORAL at 07:57

## 2025-07-08 RX ADMIN — HYDROXYZINE HYDROCHLORIDE 50 MG: 50 TABLET ORAL at 10:16

## 2025-07-08 RX ADMIN — METHOCARBAMOL 1000 MG: 500 TABLET ORAL at 15:57

## 2025-07-08 RX ADMIN — METHOCARBAMOL 750 MG: 750 TABLET ORAL at 11:32

## 2025-07-08 RX ADMIN — POLYETHYLENE GLYCOL 3350 17 G: 17 POWDER, FOR SOLUTION ORAL at 08:08

## 2025-07-08 RX ADMIN — DIAZEPAM 5 MG: 5 TABLET ORAL at 16:24

## 2025-07-08 RX ADMIN — DIAZEPAM 5 MG: 5 TABLET ORAL at 08:50

## 2025-07-08 RX ADMIN — FAMOTIDINE 20 MG: 20 TABLET, FILM COATED ORAL at 20:03

## 2025-07-08 RX ADMIN — ACETAMINOPHEN 975 MG: 325 TABLET ORAL at 11:32

## 2025-07-08 RX ADMIN — INSULIN HUMAN 24 UNITS/HR: 1 INJECTION, SOLUTION INTRAVENOUS at 00:25

## 2025-07-08 RX ADMIN — CEFAZOLIN SODIUM 2 G: 2 SOLUTION INTRAVENOUS at 01:01

## 2025-07-08 RX ADMIN — INSULIN GLARGINE 15 UNITS: 100 INJECTION, SOLUTION SUBCUTANEOUS at 18:03

## 2025-07-08 RX ADMIN — POLYETHYLENE GLYCOL 3350 17 G: 17 POWDER, FOR SOLUTION ORAL at 20:04

## 2025-07-08 RX ADMIN — CEFAZOLIN SODIUM 2 G: 2 SOLUTION INTRAVENOUS at 18:07

## 2025-07-08 ASSESSMENT — ACTIVITIES OF DAILY LIVING (ADL)
ADLS_ACUITY_SCORE: 36
ADLS_ACUITY_SCORE: 38
DEPENDENT_IADLS:: COOKING;CLEANING;LAUNDRY;SHOPPING;MEAL PREPARATION;TRANSPORTATION
ADLS_ACUITY_SCORE: 36
ADLS_ACUITY_SCORE: 40
ADLS_ACUITY_SCORE: 36
ADLS_ACUITY_SCORE: 38
ADLS_ACUITY_SCORE: 36
ADLS_ACUITY_SCORE: 36
ADLS_ACUITY_SCORE: 38
ADLS_ACUITY_SCORE: 36
ADLS_ACUITY_SCORE: 37
ADLS_ACUITY_SCORE: 36

## 2025-07-08 NOTE — PROVIDER NOTIFICATION
Neurosurg notified by phone that pt's hemovac drain is not holding suction. ALEKSANDRA instructed RN to clamp drain, and provider will come assess.

## 2025-07-08 NOTE — PLAN OF CARE
Status: POD #1  L3-5 fusion with pedicle screws for vertebrae fx d/t malignant mets to bone. Stay c/b by pain (started 7/7) and insuline drip (started 7/7)Hx of metastatic breast CA with fracture, smoker, DVT, schizoaffective disorder, bipolar disorder, chronic pain.   Vitals: Soft Bps but asympt. Intermittently lisseth but asympt. On RA and capno d/t dilaudid PCA.   Neuros: Alert, anxious, restless, energetic. Ox4. PERRLA. Speech intact. 4/5 t/o. Baseline N/T to bilat legs and feet. Atarax PRN given x1 for anxiety.   IV: 2 x PIV on R side. 1 infusing dilaudid PCA bolus with LR carrier. Other infusing Reg insulin- at alg 4 at 6 units/hr with NS carrier. Per flyer- okay to piggyback IV ancef to NS carrier with insulin because everything is compatible.   Labs/Electrolytes: BG Q1. No electrolyte replacements.   Resp: On RA. LSC. Capno in use because of PCA dilaudid.   Diet: Reg diet with carb counts and coverage 1:5. Good intake. Encouraging pt to drink water and to eat protein filled meals.   GI: LBM PTA. Abdo soft, round. BS audible. Gave scheduled meds.   : DTV, Crisostomo removed 7/8 am. Bladder scan on shift less than 200 ml.   Skin: Back incision with marked scant drainage- no extension. Hemovac issues end of shift- not keeping suction- team notified plan to replace. Peeling/craking to bilat hands and feet.   Pain: Constant back pain, not well managed. Team aware, and pain team following. PRN valium added. PRN oxycodone unheld. T pump ordered.   Activity: A1-2 minimal steps/pivot with GB walker. Sat up in chair. Took a few steps with PT. No brace needed.   SCDs on?: when in bed.   Social: Daughter, sons, and grandchildren visited.   ADLS: linen changed, dress/undressed, partial bed bath.   Plan: Manage pain. Void this nancy. NSG to replace Hemovac. Standing xrays when available.   Updates this shift: Started carb coverage.       Patient ambulation status: A1-2 GB walker pivot  Patient able to stand for  X-ray:No  Standing/upright x-ray complete: No  Patient using oral analgesics:yes and IV PCA  Voiding spontaneously:no DTV  Drains discontinued:no  Incision clean and dry:no, scant drainage  Bowel status:PTA

## 2025-07-08 NOTE — PLAN OF CARE
Goal Outcome Evaluation:      Plan of Care Reviewed With: patient, child          Outcome Evaluation: PT/OT evals pending

## 2025-07-08 NOTE — PROGRESS NOTES
[facility], [place]   Neurosurgery Progress Note:    Date of service: 7/8/2025    Assessment: Teresa Sanderson is a 49 year old female s/p o-arm/stealth assisted Lumbar 3-5 fusion with pedicle screws on 7/7/2025 with Dr. Goodrich.    Clinically Significant Risk Factors Present on Admission               # Drug Induced Coagulation Defect: home medication list includes an anticoagulant medication         # Anemia: based on hgb <11      # DMII: A1C = 10.5 % (Ref range: <5.7 %) within past 6 months    # Obesity: Estimated body mass index is 30.98 kg/m  as calculated from the following:    Height as of this encounter: 1.829 m (6').    Weight as of this encounter: 103.6 kg (228 lb 6.3 oz).            Plan:  - Neuro checks/vital signs: Every 4 hours  - Pain control: Dilaudid PCA, PO robaxin, atarax and valium  - Activity: up as tolerated  - Restrictions/Bracing: none  - Diet: Regular  - Drain: Hemovac drain  -  ABX: Ancef while HV drain in place  - Anti-coagulation: HOLD Xarelto  - DVT prophylaxis: SCDs only  - Imaging:  UXR when able  - PT/OT: ordered  - Pain Team Consult - appreciate recommendations   - Insulin gtt  - Endocrinology Diabetes Consult - appreciate recommendations     Interval History:  Patient very anxious this AM.  Patient reporting pain not controlled. Pain Team consult placed. HV to remain in place.  PT/OT ordered.           Objective:   Temp:  [97.5  F (36.4  C)-98.4  F (36.9  C)] 97.6  F (36.4  C)  Pulse:  [57-84] 57  Resp:  [5-20] 13  BP: ()/() 105/81  SpO2:  [94 %-100 %] 100 %  I/O last 3 completed shifts:  In: 1498.75 [P.O.:480; I.V.:1008.75; IV Piggyback:10]  Out: 1618 [Urine:1400; Drains:218]    Gen: Appears comfortable, NAD  Wound: Incision, clean, dry, intact without strikethrough  Neurologic:  - Alert & Oriented to person, place, time, and situation  - Follows commands briskly  - Speech fluent, spontaneous. No aphasia or dysarthria.  - No gaze preference.  No apparent hemineglect.  - PERRL, EOMI  - Strong eye closure, jaw clench, and cheek puff  - Face symmetric with sensation intact to light touch  - Palate elevates symmetrically, uvula midline, tongue protrudes midline  - Trapezii and sternocleidomastoid muscles 5/5 bilaterally  - No pronator drift     Del Tr Bi WE WF Gr   R 5 5 5 5 5 5   L 5 5 5 5 5 5    HF KE KF DF PF EHL   R 5 5 5 5 5 5   L 5 5 5 5 5 5     Reflexes 2+ throughout    Sensation intact and symmetric to light touch throughout    LABS  Recent Labs   Lab Test 07/08/25  0547 06/23/25  1250 06/19/25  1121   WBC 7.7 3.5* 3.2*   HGB 9.3* 9.9* 10.1*   MCV 84 89 89    165 180       Recent Labs   Lab Test 07/08/25  0806 07/08/25  0647 07/08/25  0547 07/07/25  0950 06/20/25  1215 06/19/25  1121   NA  --   --  139  --  143 141   POTASSIUM  --   --  4.2  --  3.5 3.3*   CHLORIDE  --   --  104  --  106 105   CO2  --   --  21*  --  24 23   BUN  --   --  8.7  --  8.6 12.5   CR  --   --  0.78  --  0.78 0.91   ANIONGAP  --   --  14  --  13 13   NANDO  --   --  10.0  --  9.2 9.0   * 159* 124*   < > 196* 283*    < > = values in this interval not displayed.

## 2025-07-08 NOTE — PLAN OF CARE
Goal Outcome Evaluation:      Plan of Care Reviewed With: patient    Overall Patient Progress: improving    Status: POD #1 L3-5 fusion with pedicle screws. Stay c/b by pain and insulin drip (started 7/7). Hx of metastatic breast CA, smoker, DVT, schizoaffective disorder, bipolar disorder, chronic pain.   Vitals: On 2L NC to keep sats above 94%. Soft Bps, intermittently bradycardic, asymptomatic.  Neuros: Alert to lethargic, briefly arousable to vigorous shaking. Oriented x4. 5/5 strengths t/o. N/T to bilateral legs and feet at BL per pt.  IV: PIV x2. 1 infusing insulin gtt at 6u/hr, Alg 4.  Labs/Electrolytes: BG q1 for insulin gtt.  Resp: 2L NC. LSC.  Diet: Regular diet, good intake. Carb counts/coverage in place.  GI: LBM PTA - BS normoactive, bowel meds given.  : VDSP to commode, external catheter applied at 2130 due to over sedation. BS 50ml at 2300.  Skin: Primapore to back incision with drainage marked - no extension. Hemovac in place. Lidocaine patches x2 to lower back.  Pain: PCA Dilaudid discontinued. PRN Dilaudid IV ordered. Patient currently asleep/resting comfortably.  Activity: A1-2/GB/pivot/FWW.  SCDs on?: Yes  Social: Family visited today.  Plan: Continue with POC

## 2025-07-08 NOTE — PROGRESS NOTES
07/08/25 1138   Appointment Info   Signing Clinician's Name / Credentials (PT) Macarena Echeverria, PT, DPT   Rehab Comments (PT) pt wants to return home, not rehab   Living Environment   Current Living Arrangements house   Home Accessibility stairs to enter home   Living Environment Comments per pt ~ 3 stairs from back, no rails but pt prefers over front stairs (has 5+ in front); per pt family can assist pt up the stairs without railings   Self-Care   Usual Activity Tolerance moderate   Current Activity Tolerance fair   Equipment Currently Used at Home   (has a walker, but does not normally use it)   Fall history within last six months yes   Activity/Exercise/Self-Care Comment prior IND with mobility,not provided assistance. Pt reported not using any ADs, but with prior balance concerns. Per pt, family can assist at discharge. Her daughter is taking a leave to care for pt. family voiced this is correct.   General Information   Onset of Illness/Injury or Date of Surgery 07/07/25   Referring Physician Liz Mcmahon, APRN CNP   Patient/Family Therapy Goals Statement (PT) Return to home in 3 days with family assistance.   Pertinent History of Current Problem (include personal factors and/or comorbidities that impact the POC) Teresa Sanderson is a 49 year old female s/p o-arm/stealth assisted Lumbar 3-5 fusion with pedicle screws on 7/7/2025 with Dr. Goodrich.   Existing Precautions/Restrictions fall;spinal  (no brace needed)   Weight-Bearing Status - LUE partial weight-bearing (% in comments)  (10#)   Weight-Bearing Status - RUE partial weight-bearing (% in comments)  (10#)   Cognition   Safety Deficit (Cognition) impulsivity;safety precautions awareness;safety precautions follow-through/compliance   Pain Assessment   Patient Currently in Pain Yes, see Vital Sign flowsheet  (post-op spine pain)   Posture    Posture Comments reduced upright posture   Bed Mobility   Comment, (Bed Mobility) ELLA x 1 supine>sit,  MODA-MAXA x 2-3 sit>supine   Transfers   Comment, (Transfers) MODA x 2 STS, wide heavy duty FWW, knee buckling noted, NBOS   Gait/Stairs (Locomotion)   Comment, (Gait/Stairs) Ambulated w/wide heavy duty FWW, unsteady gait, some knee buckling, NBOS   Balance   Balance Comments impaired, unsteady in standing, prone to anterior lean when using walker   Clinical Impression   Criteria for Skilled Therapeutic Intervention Yes, treatment indicated   PT Diagnosis (PT) impaired mobility   Influenced by the following impairments fatigue, pain, reduced activity tolerance, balance, strength, ROM   Functional limitations due to impairments below baseline bed mobility, transfers, gait   Clinical Presentation (PT Evaluation Complexity) evolving   Clinical Presentation Rationale clinical judgement, [facility]   Clinical Decision Making (Complexity) moderate complexity   Planned Therapy Interventions (PT) balance training;bed mobility training;gait training;home exercise program;neuromuscular re-education;patient/family education;postural re-education;ROM (range of motion);stair training;strengthening;stretching;transfer training;risk factor education;home program guidelines;progressive activity/exercise   Risk & Benefits of therapy have been explained evaluation/treatment results reviewed;care plan/treatment goals reviewed;risks/benefits reviewed;current/potential barriers reviewed;participants voiced agreement with care plan;participants included;patient   PT Total Evaluation Time   PT Eval, Moderate Complexity Minutes (82692) 6   Physical Therapy Goals   PT Frequency Daily   PT Predicted Duration/Target Date for Goal Attainment 07/22/25   PT Goals Bed Mobility;Transfers;Gait;Stairs   PT: Bed Mobility Independent;Supine to/from sit;Rolling;Bridging;Within precautions   PT: Transfers Modified independent;Sit to/from stand;Bed to/from chair;Assistive device;Within precautions   PT: Gait Modified independent;Within precautions;150 feet    PT: Stairs Minimal assist;Within precautions;3 stairs   PT Discharge Planning   PT Plan spine precaution handout; progress activity as safe/able such as continued gait with FWW and close WC follow, if gait unsafe focus on transfers   PT Discharge Recommendation (DC Rec) Acute Rehab Center-Motivated patient will benefit from intensive, interdisciplinary therapy vs TCU pending status, activity and therapy tolerance   PT Rationale for DC Rec Pt is presenting with reduced functional IND and increased falls risk. Pt is strongly motivated to return home but currently requires Ax2, is limited in walking tolerance and safety is much impaired s/p spine surgery. Currently advise a rehab stay due to above concerns and deficits. If pt makes more rapid progress over further length of stay and is no longer Ax2, and pt's daughter feels able to manage assisting pt, and family can manage helping pt upstairs no railings after practiced and pt demonstrates safety and PT, then would consider home discharge.    PT Brief overview of current status Ax2 close pivot; ok to use lift/sling (complex spine set-up) if pt later weaker/unable to   PT Total Distance Amb During Session (feet) 14   Physical Therapy Time and Intention   Timed Code Treatment Minutes 41   Total Session Time (sum of timed and untimed services) 47

## 2025-07-08 NOTE — CONSULTS
Inpatient Diabetes Management Service : New Consult Note     Patient: Teresa Sanderson   YOB: 1975    MRN: [id number]     Date of Consult : 07/08/2025   Date of Admission: 7/7/2025     Reason for Consult:  s/p lumbar fusion, newly-diagnosed diabetes?            History of Present Illness:   Teresa Sanderson is a 49 year old who was admitted on 7/7/2025 . Inpatient Diabetes Service consulted for management of hyperglycemia.  Patient is admitted to the hospital with for L3-L5 fusion as she was found to have bilateral L4 pedicle fractures. We were consulted for new onset severe hyperglycemia postoperatively with inpatient diabetes management.     She  received Dexamethasone 4 mg IV preoperatively on 07/07.  Ursula op glucose readings  went above to 200s and 300s in POC glucose and the last lab glucose was 124.  Insulin drip was initiated on 07/07.    She has never before been told she has  diabetes mellitus. The record documents prediabetes to 1/2023.  She never started the metformin that was recently prescribed prior to admission .  The A1C 07/07/2025 was 10.5% which is a significant increase from previous values.  A1C   01/05/23 - 6.4  09/11/24 - 6.0  03/04/25 - 6.1  06/19/25 - 8.6  07/07/25 - 10.5    She reports recent significant symptoms of thirst and  polyuria.  She has to get up at night multiple times to urinate. She was drinking multiple sodas daily -- coke and sprite. Despite this, she also has had recent weight loss and worsening of vision.  She was not told of diabetic retinopathy at recent eye exam.   Weight over 5 weeks  06/20 - 241 lbs  06/23 - 234 lbs  07/08 - 228 lbs    Risks for diabetes include the following:   -Both her daughter and granddaughter have diabetes mellitus. Her daughter is on insulin and metformin. Maternal side cousins  all have diabetes.   -She is on Olanzapine 5-10 mg at night as outpatient.  She has been on olanzapine since 2021  She has history of Verzenio treatment  "3/27/25 -4/16/25     History obtained via the patient, chart review and discussion with primary team.       Last dose insulin PTA: N/A   Current inpatient regimen:  Regular Insulin IV drip    BG at time of consult: 124  Planned Procedures/Surgeries: Had spinal fusion L3-L4 on 07/07      Inpatient Glucose Trends:           Diabetes History:   Diabetes Type and Duration: 6/19/25 based on HgbA1c ; prediabetes 2023  GAD65 antibody, zinc transporter 8 antibody, islet antibody, insulin antibody, and c-peptide  not available on epic search   PTA Medication Regimen: N/A  Missing doses?: None  Historical Diabetes Medications: None  Glucose monitoring device/frequency/trends: None  Outpatient Diabetes Provider: None  Formal Diabetes Education/Educator: None  ------------------------  Complications:  Has peripheral neuropathy and worsening of vision recently    Most recent A1c: 10.5    Hemoglobin at time of most recent A1c: 9.3  RBC transfusion 3 months prior to most recent A1c:  none      History of DKA: no           Medications Impacting Glycemia:    Steroids: Received Dexamethasone 4 mg perioperatively  D5W containing solutions/medications: None  Other medications impacting glucose: Olanzapine         Diet/Nutrition:    Orders Placed This Encounter      Advance Diet as Tolerated: Regular Diet Adult            Review of Systems:    Feet have \"rash\"   Hand cramps   Leg pains   All 12 systems reviewed and negative except mentioned above         Past Medical History:       Past Medical History:   Diagnosis Date    Anxiety     Breast CA (H) 12/2020    presenting with bone mets    Depression     DM2 (diabetes mellitus, type 2) (H) 06/19/2025    DVT (deep venous thrombosis) (H) 2014    Hx of radiation therapy     Left breast mass     x approximately 4-5 months    Metastasis to bone (H)     Pathological fracture of vertebra due to malignant neoplasm metastatic to bone (H)     Prediabetes 2023    Pulmonary emboli (H)     " Pyelonephritis     Schizoaffective disorder (H)     Tobacco use            Past Surgical History:      Past Surgical History:   Procedure Laterality Date    COLONOSCOPY N/A 7/8/2022    Procedure: COLONOSCOPY, WITH POLYPECTOMY;  Surgeon: Ham Cano MD;  Location: [facility] OR    ESOPHAGOSCOPY, GASTROSCOPY, DUODENOSCOPY (EGD), COMBINED N/A 3/7/2025    Procedure: ESOPHAGOGASTRODUODENOSCOPY, WITH BIOPSY;  Surgeon: Nguyen Holliday MD;  [place] [facility] [place]    IR LUMBAR KYPHOPLASTY VERTEBRAE  5/17/2021    LAPAROSCOPIC SALPINGO-OOPHORECTOMY Bilateral 8/20/2021    Procedure: BILATERAL SALPINGO-OOPHORECTOMY, LAPAROSCOPIC;  Surgeon: Rory Lopez MD;  [place] [facility] [place]    OPTICAL TRACKING SYSTEM FUSION SPINE POSTERIOR LUMBAR THREE+ LEVELS N/A 7/7/2025    Procedure: o-arm/stealth assisted Lumbar 3-5 fusion with pedicle screws;  Surgeon: Modesta Goodrich MD;  Location: [facility] OR    TUBAL LIGATION  1998             Social History:      Social History     Tobacco Use    Smoking status: Some Days     Current packs/day: 0.25     Average packs/day: 0.3 packs/day for 14.2 years (3.5 ttl pk-yrs)     Types: Cigarettes     Start date: 5/13/2011     Passive exposure: Current    Smokeless tobacco: Never    Tobacco comments:     6/19/25: Patient states she has had 2 cigarettes in the last 48 hours. States she is not smoking daily but trying to quit.   Substance Use Topics    Alcohol use: Not Currently     Comment: occ        History   Sexual Activity    Sexual activity: Not Currently    Partners: Male             Family History:    Family History of Diabetes: Positive  Daughter, grand-daughter, Cousins on maternal side    Family History   Problem Relation Age of Onset    Lung Cancer Mother     Lung Cancer Father     Lupus Brother     Kidney Disease Brother     Diabetes Type 1 Daughter     Deep Vein Thrombosis (DVT) Daughter         provoked due to procedure and travel    Asthma Son     Cardiovascular Maternal Aunt     Diabetes  Granddaughter     Hypertension Cousin     Diabetes Cousin     Anesthesia Reaction No family hx of              Physical Exam:    /61 (BP Location: Left arm)   Pulse 76   Temp 98  F (36.7  C) (Oral)   Resp 15   Ht 1.829 m (6')   Wt 103.6 kg (228 lb 6.3 oz)   SpO2 94%   BMI 30.98 kg/m     General:  well appearing, in no acute distress.  HEENT:  NC/AT. MMM, sclera not injected; partially edentulous  CARDIAC: RRR S1 S2 without murmurs, rubs, gallops  Lungs:  breathing unlabored;   ABD:  rounded, soft, non-tender  Skin:  warm and dry, no obvious lesions  MSK:   moving all extremities  Lymp:   No LE edema  Mental Status:  Alert and oriented x3  Psych:   Cooperative, good eye contact, full/appropriate affect  Feet:    warm ; scaling skin plantar surface;            Laboratory Data:      Lab Results   Component Value Date    A1C 10.5 (H) 07/07/2025    A1C 8.6 (H) 06/19/2025    A1C 6.1 (H) 03/04/2025    A1C 6.0 (H) 09/11/2024    A1C 6.4 (H) 01/05/2023     Recent Labs   Lab Test 07/08/25  0547   WBC 7.7   RBC 3.46*   HGB 9.3*   HCT 29.2*   MCV 84   MCH 26.9   MCHC 31.8   RDW 14.6        Recent Labs   Lab Test 07/08/25  0806 07/08/25  0647 07/08/25  0547 07/07/25  0950 06/20/25  1215   NA  --   --  139  --  143   POTASSIUM  --   --  4.2  --  3.5   CHLORIDE  --   --  104  --  106   CO2  --   --  21*  --  24   ANIONGAP  --   --  14  --  13   * 159* 124*   < > 196*   BUN  --   --  8.7  --  8.6   CR  --   --  0.78  --  0.78   NANDO  --   --  10.0  --  9.2    < > = values in this interval not displayed.     4/22/25 PET/CT: pancreas WNL         Assessment and Recommendations:       Assessment:   49 year old female with multiple chronic comorbidites and recent L3-L5 spinal fusion, now with new onset severe hyperglycemia in the setting of poorly controlled or previously undiagnosed diabetes mellitus (A1C 10.5%).      Diabetes mellitus with hyperglycemia, unstable.  Presumably she has DM2 but DM1 is also in the  differential given how rapidly her A1c deteriorated.   Given the degree of hyperglycemia, she will require insulin treatment after discharge.   Stress hyperglycemia post operative from L spine surgery  Steroid hyperglycemia due to dexamethasone given with the operation   Weight loss due to # 1     -- Continue the IV regular Insulin drip for now as the patient has unsteady hyperglycemia   -- We will give one dose of Lantus 15 U right now  -- Goal is to have a steady glucose levels for 6-8 hours to transition the patient off of IV insulin and to create a plan for basal bolus insulin  -- We will start carb coverage aspart subcutaneously 1 unit per 5 g CHO, TID AC with meals/snacks  -- please send Diabetes education consult to teach her insulin, BS monitoring  - She will require outpatient diabetes provider care after discharge.   -- Hypoglycemia protocol  -- Carb counting protocol      Thank you for this consult. IDS will continue to follow.      Please notify Inpatient Diabetes Service if changes are planned to steroids, nutrition, TPN/TF and anticipated procedures requiring prolonged NPO status.     DEMETRIUS Murdock    To contact Inpatient Diabetes Service:     7 AM - 5 PM: Page the IDS ALEKSANDRA following the patient that day (see filed or incomplete progress notes/consult notes under Endocrinology)    OR if uncertain of provider assignment: page job code [phone]    5 PM - 7 AM: First call after hours is to primary service.    For urgent after-hours questions, page job code for on call fellow: [phone]

## 2025-07-08 NOTE — CONSULTS
Pain Service Consultation Note  [facility]      Patient Name: Teresa Sanderson  MRN: [id number]   Age: 49 year old  Sex: female  Date: July 8, 2025                                        Reviewed: Yes  Per [place]  review pulled from system on 07/08/25. Last refill on 6/20/25, indicating the following analgesic usage: Ocycodone and buprenorphine.   Pain Medications/Prescriber: Ayanna Tellez DO     Referring Service:  Neurosurgery  Reason for Consultation: Post op pain control    Assessment/Recommendations:  49 year old woman Pmhx former smoker, anemia, history of DVT/chronic DVT, migraine, obesity, schizoaffective disorder, bipolar disorder, chronic pain (144 MME/day at the current time) and metastatic breast cancer with pathologic fracture. She has had previous radiofrequency ablation kyphoplasty in 2021 as well as radiation therapy.   She is POD#1 s/p Lumbar 3-5 fusion and with diffficult to manage pain control.   Currently on her belbuca and dPCA at 0.1mg q 10 mins with a 0.7mg lockout per hour.   She currently endorses 20/10 pain with a baseline at home of 8/10. Patient understands her dPCA but utilizing it (used 1/4 of her doses available per the chart).     Plan:   Continue dPCA and encourage its use  If pain continue to be a problem and not utilizing her PCA  Oxycodone 20mg tid PO  Dilaudid 0.2-0.4mg IV q3 for breakthrough  Continue Acetaminophen, Gabapentin, lidocaine patches, Robaxin and zyprexa  Can considering Gabapentin 600mg at bedtime to help pain/sleep    Thank you for the opportunity to participate in the care of Teresa Sanderson  Pain Service will continue to follow.    Primary Service Contacted with Recommendations? Yes    Noé Kuhn DO  7/8/2025      Chief Complaint:  Back pain      History of Present Illness:  Teresa Sanderson is a 49 year old female.  The pain is reported to be acute, chronic, located in the back, and radiates to her left leg.   Current pain is rated at 20/10 and goal is 8/10. The patient is with chronic pain. The patient has a opioid tolerance.     Pharmacological treatments (in past) have included belbuca and oxycodone.     Pain Assessment:   Description of Pain: ache  Location: lower back  Current Pain: 20/10  Goal: 8/10  Baseline Pain Level: 8/10  Onset:years   Relieving/exacerbating factors: medications   Radiating: left leg     Past Medical History:  Past Medical History:   Diagnosis Date    Anxiety     Breast CA (H) 12/2020    Depression     DVT (deep venous thrombosis) (H) 2014    Left breast mass     x approximately 4-5 months    Pathological fracture of vertebra due to malignant neoplasm metastatic to bone (H)     Pulmonary emboli (H)     Pyelonephritis     Schizoaffective disorder (H)     Tobacco use          Family History:    Family History   Problem Relation Age of Onset    Lung Cancer Mother     Lung Cancer Father     Lupus Brother     Kidney Disease Brother     Diabetes Daughter     Deep Vein Thrombosis (DVT) Daughter         provoked due to procedure and travel    Asthma Son     Cardiovascular Maternal Aunt     Hypertension Other     Diabetes Other     Anesthesia Reaction No family hx of        Social History:  Social History     Tobacco Use    Smoking status: Some Days     Current packs/day: 0.25     Average packs/day: 0.3 packs/day for 14.2 years (3.5 ttl pk-yrs)     Types: Cigarettes     Start date: 5/13/2011     Passive exposure: Current    Smokeless tobacco: Never    Tobacco comments:     6/19/25: Patient states she has had 2 cigarettes in the last 48 hours. States she is not smoking daily but trying to quit.   Substance Use Topics    Alcohol use: Not Currently     Comment: occ             Review of Systems:  Complete ROS reviewed. Unless otherwise noted, all other systems found to be negative.        Laboratory Results:  Recent Labs   Lab Test 07/08/25  0547 03/04/25  1111 02/18/25  1008 07/09/24  1419 07/01/24  1330    INR  --   --  0.96  --  1.11      < >  --    < > 167   PTT  --   --   --   --  25   BUN 8.7   < >  --    < > 12.2    < > = values in this interval not displayed.       Allergies:  Allergies   Allergen Reactions    Contrast Dye      Pt developed nausea after isovue 370 injection on 6/9/21          Current Pain Related Medications:  Medications related to Pain Management (From now, onward)      Start     Dose/Rate Route Frequency Ordered Stop    07/10/25 0000  bisacodyl (DULCOLAX) suppository 10 mg         10 mg Rectal DAILY PRN 07/07/25 2015 07/09/25 0000  magnesium hydroxide (MILK OF MAGNESIA) suspension 30 mL         30 mL Oral DAILY PRN 07/07/25 2015 07/08/25 0830  Buprenorphine HCl (BELBUCA) buccal film 600 mcg        Note to Pharmacy: PTA Sig:Place 1 Film (300 mcg) inside cheek every 12 hours.      600 mcg Buccal EVERY 12 HOURS 07/07/25 2131 07/08/25 0830  polyethylene glycol (MIRALAX) Packet 17 g         17 g Oral 3 TIMES DAILY 07/08/25 0814 07/08/25 0813  diazepam (VALIUM) tablet 5 mg         5 mg Oral EVERY 6 HOURS PRN 07/08/25 0813 07/08/25 0813  hydrOXYzine HCl (ATARAX) tablet 50 mg        Note to Pharmacy: PTA Sig:Take 1 tablet (25 mg) by mouth 4 times daily as needed for anxiety or other (panic).  Patient taking differently: Take 25 mg by mouth every morning.      50 mg Oral 4 TIMES DAILY PRN 07/08/25 0813      07/08/25 0200  HYDROmorphone (DILAUDID) PCA 0.2 mg/mL OPIOID TOLERANT          Intravenous CONTINUOUS 07/08/25 0158      07/08/25 0100  methocarbamol (ROBAXIN) tablet 750 mg         750 mg Oral 4 TIMES DAILY 07/07/25 1816 07/07/25 2030  gabapentin (NEURONTIN) capsule 300 mg        Note to Pharmacy: PTA Sig:Take 1 capsule (300 mg) by mouth 3 times daily.      300 mg Oral 3 TIMES DAILY 07/07/25 2015 07/07/25 2030  acetaminophen (TYLENOL) tablet 975 mg         975 mg Oral EVERY 8 HOURS 07/07/25 2015 07/07/25 2030  Lidocaine (LIDOCARE) 4 % Patch 2 patch   "       2 patch  over 12 Hours Transdermal EVERY 24 HOURS 2000 07/07/25 2015 07/07/25 2015  lidocaine 1 % 0.1-1 mL         0.1-1 mL Other EVERY 1 HOUR PRN 07/07/25 2015 07/07/25 2015  lidocaine (LMX4) cream          Topical EVERY 1 HOUR PRN 07/07/25 2015 07/07/25 2000  senna-docusate (SENOKOT-S/PERICOLACE) 8.6-50 MG per tablet 2 tablet        Placed in \"Or\" Linked Group    2 tablet Oral 2 TIMES DAILY 07/07/25 1902 07/07/25 1816  [Held by provider]  oxyCODONE IR (ROXICODONE) tablet 10 mg        (On hold since yesterday at 2053 until manually unheld; held by Modesta Goodrich MDHold Reason: Other)   Placed in \"Or\" Linked Group    10 mg Oral EVERY 3 HOURS PRN 07/07/25 1816 07/07/25 1816  [Held by provider]  oxyCODONE (ROXICODONE) tablet 15 mg        (On hold since yesterday at 2053 until manually unheld; held by Modesta Goodrich MDHold Reason: Other)   Placed in \"Or\" Linked Group    15 mg Oral EVERY 3 HOURS PRN 07/07/25 1816                Physical Exam:  Vitals: /81 (BP Location: Right arm)   Pulse 57   Temp 97.6  F (36.4  C) (Oral)   Resp 13   Ht 1.829 m (6')   Wt 103.6 kg (228 lb 6.3 oz)   SpO2 100%   BMI 30.98 kg/m      Physical Exam:     CONSTITUTIONAL/GENERAL APPEARANCE:  NAD  EYES: EOMI, sclera anicteric, PERRLA  ENT/NECK: atraumatic, lips and oral mucous membranes dry  RESPIRATORY: non-labored breathing. No cough, wheeze  CV: RRR, no audible rubs, gallops, or murmurs  ABDOMEN: Soft, non-tender, non-distended  MUSCULOSKELETAL/BACK/SPINE/EXTREMITIES: Moves all extremities purposefully.  No edema or obvious joint deformities   NEURO: Alert and Oriented x3. Answers questions appropriately  SKIN/VASCULAR EXAM:  No jaundice, no visible rashes or lesions      Please see A&P for additional details of medical decision making.      Acute Inpatient Pain Service [facility]  Hours of pain coverage 24/7   Please PAGE via Vocera - Link to [employer] Here - Search Pain  Page via Am[employer]- Please " "Page the Pain Team Via [facility]: \"PAIN MANAGEMENT ACUTE INPATIENT/ [facility] [place]\"           "

## 2025-07-08 NOTE — PROGRESS NOTES
Brief Neurosurgery Note      RN paged provider that distal portion of drain (hemovac) had disconnected. Came to bedside, cut off disconnected portion of drain, reprepped end of drain, then connected and secured to a new hemovac using usual sterile procedure.    Gill Bolden MD  Neurosurgery PGY-2  Personal pager #[phone]  Please page #[phone] (urgent)/VOCERA (non-urgent) the on-call neurosurgery resident per [facility] with questions

## 2025-07-08 NOTE — PLAN OF CARE
Status: POD #0 s/p Lumbar 3-5 fusion   Vitals: VSS on 2 L NC  Neuros: A&Ox4, 5/5 throughout, lethargic but easily arousable  IV: PIV infusing continuous and PCA dilaudid at 0.1 mg/hr, other PIV infusing ins gtt (see MAR for rate)  Labs/Electrolytes: BG checks q1 hr  Resp: Apneic at times  Diet: Regular, good PO  GI: LBM 7/6 PTA  : Crisostomo in place w/ AUO, to be removed POD 1  Skin: Back incision covered w/ primapore, marked - small amount of drainage. Hemovac x1  Pain: Consistent 10/10 pain, partially managed w/ PCA dilaudid, sleeping in between cares   Activity: Not OOB yet  Social: Pt spoke w/ friend and daughter on the phone  Plan/updates: Continue POC      Arrived from: PACU  Belongings/meds: None  2 RN Skin Assessment Completed by: Barb SILVERIO  Skin Findings: See above  Non-intact findings documented (yes/no/NA): yes

## 2025-07-08 NOTE — PHARMACY-ADMISSION MEDICATION HISTORY
Pharmacy Intern Admission Medication History    Admission medication history is complete. The information provided in this note is only as accurate as the sources available at the time of the update.    Information Source(s): Patient and Patient's pharmacy via in-person    Pertinent Information:   Patient is a good historian  Patient reports that she just got to know that she was diabetic yesterday when she had to ' sign a form prior to her surgery' was when she got to know about Metformin prescription that she never picked up  Patient reports that she had been told she was prediabetic, however no one followed up with her or told her about a diabetic prescription  Patient reports that she needs a refill of Cyclobenzaprine 5 mg tablet and Gabapentin 300 mg tablet (It is unclear when she took the last dose)  Patient reports that she has supply for Xarelto 20 mg, even though it was last filled 12/23/24 for 90 days per fill record  Patient reports that she never picked the Magic mouthwash suspension, Metformin 500 mg and nicotine 14 mg/24 hr patch     Changes made to PTA medication list:  Added: None  Deleted:    Buprenorphine 300 mcg film (patient reports not taking)  Dexamethasone alcohol-free 0.1 mg/ml solution (patient reports not taking)  Changed: None    Allergies reviewed with patient and updates made in EHR: yes    Medication History Completed By: Ewelina Barrera 7/8/2025 9:49 AM    Current Facility-Administered Medications for the 7/7/25 encounter (Hospital Encounter)   Medication    sodium chloride 0.9% irrigation (bag) 1,000 mL     PTA Med List   Medication Sig Last Dose/Taking    Buprenorphine HCl (BELBUCA) 600 MCG FILM buccal film Place 1 Film (600 mcg) inside cheek every 12 hours. 7/5/2025    cyclobenzaprine (FLEXERIL) 5 MG tablet Take 1 tablet (5 mg) by mouth 3 times daily. Unknown    everolimus (AFINITOR) 10 MG tablet Take 1 tablet (10 mg) by mouth daily for 28 days Avoid grapefruit and grapefruit juice.  Take with or without food, but should be consistent. 6/29/2025    fulvestrant (FASLODEX) 250 MG/5ML injection Inject 500 mg into the muscle every 28 days. 6/19/2025    gabapentin (NEURONTIN) 300 MG capsule Take 1 capsule (300 mg) by mouth 3 times daily. Unknown    hydrOXYzine HCl (ATARAX) 25 MG tablet Take 1 tablet (25 mg) by mouth 4 times daily as needed for anxiety or other (panic). Unknown    OLANZapine (ZYPREXA) 5 MG tablet Take 1-2 tablets (5-10 mg) by mouth nightly as needed for sleep. Take 1/2 tablet (2.5mg) daily as needed for hypomania/ tom. Unknown    prochlorperazine (COMPAZINE) 10 MG tablet Take 1 tablet (10 mg) by mouth every 6 hours as needed for nausea or vomiting. Unknown    QUEtiapine (SEROQUEL) 100 MG tablet Tale 1 tablet (100mg) with 1 tablet (400mg) for total of 500mg by mouth at bedtime 7/5/2025    QUEtiapine (SEROQUEL) 400 MG tablet Take 1 tablet (400mg) with 1 tablet (100mg) for total of 500mg by mouth at bedtime 7/5/2025    QUEtiapine (SEROQUEL) 50 MG tablet Take 0.5-1 tablets (25-50 mg) by mouth 2 times daily as needed (for sleep, mood and anxiety). Unknown    rivaroxaban ANTICOAGULANT (XARELTO) 20 MG TABS tablet Take 1 tablet (20 mg) by mouth daily (with dinner). 7/5/2025    sertraline (ZOLOFT) 100 MG tablet Take 1 tablet (100 mg) by mouth daily. Unknown    Vitamin D3 (CHOLECALCIFEROL) 25 mcg (1000 units) tablet Take 1 tablet (25 mcg) by mouth daily. Unknown      [facility]welina EngelPho[facility] Hospital  Pharmacy Student
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
## 2025-07-08 NOTE — PLAN OF CARE
Status: POD #1, s/p Lumbar 3-5 fusion   Vitals: VSS on 2 L NC  Neuros: A&O x4, intermittently lethargic but easily arousable. Denies N/T. 5/5 strengths throughout  IV: PIV infusing PCA dilaudid with  0.1 mg/hr bumps other PIV infusing ins gtt  ( Alg   Labs/Electrolytes: Q1H BG checks for insulin gtt. Next draw is at 0730  Resp: Apneic, Respiration 5-6 BPM while asleep, Dr Nichols notified.Continuous rate of PCA dilaudid discontinued. Patient is still receiving PRN PCA bumps.   Diet: Regular, denies nausea  GI: LBM 7/6 PTA  : Crisostomo removed @ 0700, DTV @ 1400  Skin: Back incision covered w/ primapore, marked - small amount of drainage. Hemovac x1  Pain: Consistent 10/10 pain, partially managed w/ PCA dilaudid,. PRN atarax given for restlessness, and anxiety   Activity: Not OOB yet  Social: Cousin visited   Plan/updates: pain and BG management. Pain team consulted. Continue to monitor and follow POC       Goal Outcome Evaluation:      Plan of Care Reviewed With: patient    Overall Patient Progress: improvingOverall Patient Progress: improving    Outcome Evaluation: Constant 10/10 pain, on PCA dilaudid

## 2025-07-09 ENCOUNTER — APPOINTMENT (OUTPATIENT)
Dept: PHYSICAL THERAPY | Facility: CLINIC | Age: 50
DRG: 543 | End: 2025-07-09
Attending: NEUROLOGICAL SURGERY
Payer: MEDICARE

## 2025-07-09 ENCOUNTER — APPOINTMENT (OUTPATIENT)
Dept: OCCUPATIONAL THERAPY | Facility: CLINIC | Age: 50
DRG: 543 | End: 2025-07-09
Attending: NURSE PRACTITIONER
Payer: MEDICARE

## 2025-07-09 LAB
ANION GAP SERPL CALCULATED.3IONS-SCNC: 10 MMOL/L (ref 7–15)
BUN SERPL-MCNC: 13.1 MG/DL (ref 6–20)
CALCIUM SERPL-MCNC: 9.9 MG/DL (ref 8.8–10.4)
CHLORIDE SERPL-SCNC: 103 MMOL/L (ref 98–107)
CREAT SERPL-MCNC: 0.9 MG/DL (ref 0.51–0.95)
EGFRCR SERPLBLD CKD-EPI 2021: 78 ML/MIN/1.73M2
ERYTHROCYTE [DISTWIDTH] IN BLOOD BY AUTOMATED COUNT: 14.6 % (ref 10–15)
GLUCOSE BLDC GLUCOMTR-MCNC: 109 MG/DL (ref 70–99)
GLUCOSE BLDC GLUCOMTR-MCNC: 119 MG/DL (ref 70–99)
GLUCOSE BLDC GLUCOMTR-MCNC: 122 MG/DL (ref 70–99)
GLUCOSE BLDC GLUCOMTR-MCNC: 132 MG/DL (ref 70–99)
GLUCOSE BLDC GLUCOMTR-MCNC: 135 MG/DL (ref 70–99)
GLUCOSE BLDC GLUCOMTR-MCNC: 142 MG/DL (ref 70–99)
GLUCOSE BLDC GLUCOMTR-MCNC: 147 MG/DL (ref 70–99)
GLUCOSE BLDC GLUCOMTR-MCNC: 148 MG/DL (ref 70–99)
GLUCOSE BLDC GLUCOMTR-MCNC: 149 MG/DL (ref 70–99)
GLUCOSE BLDC GLUCOMTR-MCNC: 150 MG/DL (ref 70–99)
GLUCOSE BLDC GLUCOMTR-MCNC: 151 MG/DL (ref 70–99)
GLUCOSE BLDC GLUCOMTR-MCNC: 154 MG/DL (ref 70–99)
GLUCOSE BLDC GLUCOMTR-MCNC: 154 MG/DL (ref 70–99)
GLUCOSE BLDC GLUCOMTR-MCNC: 156 MG/DL (ref 70–99)
GLUCOSE BLDC GLUCOMTR-MCNC: 165 MG/DL (ref 70–99)
GLUCOSE BLDC GLUCOMTR-MCNC: 169 MG/DL (ref 70–99)
GLUCOSE BLDC GLUCOMTR-MCNC: 170 MG/DL (ref 70–99)
GLUCOSE BLDC GLUCOMTR-MCNC: 175 MG/DL (ref 70–99)
GLUCOSE BLDC GLUCOMTR-MCNC: 181 MG/DL (ref 70–99)
GLUCOSE BLDC GLUCOMTR-MCNC: 189 MG/DL (ref 70–99)
GLUCOSE BLDC GLUCOMTR-MCNC: 198 MG/DL (ref 70–99)
GLUCOSE BLDC GLUCOMTR-MCNC: 225 MG/DL (ref 70–99)
GLUCOSE SERPL-MCNC: 142 MG/DL (ref 70–99)
HCO3 SERPL-SCNC: 25 MMOL/L (ref 22–29)
HCT VFR BLD AUTO: 28.7 % (ref 35–47)
HGB BLD-MCNC: 8.8 G/DL (ref 11.7–15.7)
MAGNESIUM SERPL-MCNC: 1.6 MG/DL (ref 1.7–2.3)
MCH RBC QN AUTO: 27.1 PG (ref 26.5–33)
MCHC RBC AUTO-ENTMCNC: 30.7 G/DL (ref 31.5–36.5)
MCV RBC AUTO: 88 FL (ref 78–100)
PHOSPHATE SERPL-MCNC: 5 MG/DL (ref 2.5–4.5)
PLATELET # BLD AUTO: 179 10E3/UL (ref 150–450)
POTASSIUM SERPL-SCNC: 4.5 MMOL/L (ref 3.4–5.3)
RBC # BLD AUTO: 3.25 10E6/UL (ref 3.8–5.2)
SODIUM SERPL-SCNC: 138 MMOL/L (ref 135–145)
WBC # BLD AUTO: 6.3 10E3/UL (ref 4–11)

## 2025-07-09 PROCEDURE — 250N000013 HC RX MED GY IP 250 OP 250 PS 637

## 2025-07-09 PROCEDURE — 97165 OT EVAL LOW COMPLEX 30 MIN: CPT | Mod: GO

## 2025-07-09 PROCEDURE — 97530 THERAPEUTIC ACTIVITIES: CPT | Mod: GP | Performed by: PHYSICAL THERAPIST

## 2025-07-09 PROCEDURE — 36415 COLL VENOUS BLD VENIPUNCTURE: CPT

## 2025-07-09 PROCEDURE — 84100 ASSAY OF PHOSPHORUS: CPT

## 2025-07-09 PROCEDURE — 85027 COMPLETE CBC AUTOMATED: CPT

## 2025-07-09 PROCEDURE — 250N000013 HC RX MED GY IP 250 OP 250 PS 637: Performed by: NURSE PRACTITIONER

## 2025-07-09 PROCEDURE — 250N000012 HC RX MED GY IP 250 OP 636 PS 637

## 2025-07-09 PROCEDURE — 80048 BASIC METABOLIC PNL TOTAL CA: CPT

## 2025-07-09 PROCEDURE — 99233 SBSQ HOSP IP/OBS HIGH 50: CPT | Performed by: PHYSICIAN ASSISTANT

## 2025-07-09 PROCEDURE — 250N000011 HC RX IP 250 OP 636

## 2025-07-09 PROCEDURE — 250N000009 HC RX 250

## 2025-07-09 PROCEDURE — 120N000002 HC R&B MED SURG/OB UMMC

## 2025-07-09 PROCEDURE — 83735 ASSAY OF MAGNESIUM: CPT

## 2025-07-09 PROCEDURE — 250N000011 HC RX IP 250 OP 636: Performed by: NURSE PRACTITIONER

## 2025-07-09 PROCEDURE — 97530 THERAPEUTIC ACTIVITIES: CPT | Mod: GO

## 2025-07-09 RX ORDER — OXYCODONE HYDROCHLORIDE 10 MG/1
10 TABLET ORAL
Refills: 0 | Status: DISCONTINUED | OUTPATIENT
Start: 2025-07-09 | End: 2025-07-10

## 2025-07-09 RX ORDER — HEPARIN SODIUM 5000 [USP'U]/.5ML
5000 INJECTION, SOLUTION INTRAVENOUS; SUBCUTANEOUS EVERY 8 HOURS
Status: DISCONTINUED | OUTPATIENT
Start: 2025-07-09 | End: 2025-07-15 | Stop reason: HOSPADM

## 2025-07-09 RX ORDER — HYDROMORPHONE HCL IN WATER/PF 6 MG/30 ML
0.2 PATIENT CONTROLLED ANALGESIA SYRINGE INTRAVENOUS EVERY 4 HOURS PRN
Status: DISCONTINUED | OUTPATIENT
Start: 2025-07-09 | End: 2025-07-10 | Stop reason: ALTCHOICE

## 2025-07-09 RX ORDER — OXYCODONE HYDROCHLORIDE 5 MG/1
5 TABLET ORAL
Refills: 0 | Status: DISCONTINUED | OUTPATIENT
Start: 2025-07-09 | End: 2025-07-10

## 2025-07-09 RX ORDER — METHOCARBAMOL 750 MG/1
750 TABLET, FILM COATED ORAL 4 TIMES DAILY
Status: DISCONTINUED | OUTPATIENT
Start: 2025-07-09 | End: 2025-07-10

## 2025-07-09 RX ADMIN — POLYETHYLENE GLYCOL 3350 17 G: 17 POWDER, FOR SOLUTION ORAL at 08:34

## 2025-07-09 RX ADMIN — SENNOSIDES AND DOCUSATE SODIUM 2 TABLET: 50; 8.6 TABLET ORAL at 08:36

## 2025-07-09 RX ADMIN — GABAPENTIN 300 MG: 300 CAPSULE ORAL at 19:01

## 2025-07-09 RX ADMIN — INSULIN HUMAN 8 UNITS/HR: 1 INJECTION, SOLUTION INTRAVENOUS at 02:17

## 2025-07-09 RX ADMIN — CEFAZOLIN SODIUM 2 G: 2 SOLUTION INTRAVENOUS at 02:18

## 2025-07-09 RX ADMIN — METHOCARBAMOL 750 MG: 750 TABLET ORAL at 16:14

## 2025-07-09 RX ADMIN — GABAPENTIN 300 MG: 300 CAPSULE ORAL at 13:58

## 2025-07-09 RX ADMIN — ACETAMINOPHEN 975 MG: 325 TABLET ORAL at 11:31

## 2025-07-09 RX ADMIN — HYDROXYZINE HYDROCHLORIDE 50 MG: 50 TABLET ORAL at 18:36

## 2025-07-09 RX ADMIN — METHOCARBAMOL 750 MG: 750 TABLET ORAL at 19:01

## 2025-07-09 RX ADMIN — ACETAMINOPHEN 975 MG: 325 TABLET ORAL at 05:20

## 2025-07-09 RX ADMIN — HEPARIN SODIUM 5000 UNITS: 5000 INJECTION, SOLUTION INTRAVENOUS; SUBCUTANEOUS at 10:53

## 2025-07-09 RX ADMIN — OXYCODONE HYDROCHLORIDE 10 MG: 10 TABLET ORAL at 19:17

## 2025-07-09 RX ADMIN — ACETAMINOPHEN 975 MG: 325 TABLET ORAL at 21:01

## 2025-07-09 RX ADMIN — BUPRENORPHINE HYDROCHLORIDE 600 MCG: 600 FILM, SOLUBLE BUCCAL at 20:28

## 2025-07-09 RX ADMIN — OXYCODONE HYDROCHLORIDE 10 MG: 10 TABLET ORAL at 11:28

## 2025-07-09 RX ADMIN — POLYETHYLENE GLYCOL 3350 17 G: 17 POWDER, FOR SOLUTION ORAL at 13:58

## 2025-07-09 RX ADMIN — SERTRALINE HYDROCHLORIDE 100 MG: 100 TABLET ORAL at 08:36

## 2025-07-09 RX ADMIN — HEPARIN SODIUM 5000 UNITS: 5000 INJECTION, SOLUTION INTRAVENOUS; SUBCUTANEOUS at 18:07

## 2025-07-09 RX ADMIN — QUETIAPINE FUMARATE 500 MG: 400 TABLET ORAL at 21:01

## 2025-07-09 RX ADMIN — GABAPENTIN 300 MG: 300 CAPSULE ORAL at 10:52

## 2025-07-09 RX ADMIN — FAMOTIDINE 20 MG: 20 TABLET, FILM COATED ORAL at 08:37

## 2025-07-09 RX ADMIN — CEFAZOLIN SODIUM 2 G: 2 SOLUTION INTRAVENOUS at 09:17

## 2025-07-09 RX ADMIN — OXYCODONE HYDROCHLORIDE 10 MG: 10 TABLET ORAL at 16:13

## 2025-07-09 RX ADMIN — HYDROMORPHONE HYDROCHLORIDE 0.2 MG: 0.2 INJECTION, SOLUTION INTRAMUSCULAR; INTRAVENOUS; SUBCUTANEOUS at 22:58

## 2025-07-09 RX ADMIN — METHOCARBAMOL 750 MG: 750 TABLET ORAL at 11:31

## 2025-07-09 RX ADMIN — FAMOTIDINE 20 MG: 20 TABLET, FILM COATED ORAL at 19:01

## 2025-07-09 RX ADMIN — Medication 25 MCG: at 08:37

## 2025-07-09 RX ADMIN — OLANZAPINE 5 MG: 5 TABLET, FILM COATED ORAL at 21:01

## 2025-07-09 RX ADMIN — INSULIN HUMAN 7 UNITS/HR: 1 INJECTION, SOLUTION INTRAVENOUS at 19:00

## 2025-07-09 RX ADMIN — HYDROMORPHONE HYDROCHLORIDE 0.2 MG: 0.2 INJECTION, SOLUTION INTRAMUSCULAR; INTRAVENOUS; SUBCUTANEOUS at 12:51

## 2025-07-09 RX ADMIN — BUPRENORPHINE HYDROCHLORIDE 600 MCG: 600 FILM, SOLUBLE BUCCAL at 09:17

## 2025-07-09 RX ADMIN — LIDOCAINE 2 PATCH: 4 PATCH TOPICAL at 19:18

## 2025-07-09 RX ADMIN — METHOCARBAMOL 1000 MG: 500 TABLET ORAL at 08:36

## 2025-07-09 RX ADMIN — HYDROMORPHONE HYDROCHLORIDE 0.2 MG: 0.2 INJECTION, SOLUTION INTRAMUSCULAR; INTRAVENOUS; SUBCUTANEOUS at 18:06

## 2025-07-09 ASSESSMENT — ACTIVITIES OF DAILY LIVING (ADL)
ADLS_ACUITY_SCORE: 36
ADLS_ACUITY_SCORE: 38
ADLS_ACUITY_SCORE: 36
ADLS_ACUITY_SCORE: 38
ADLS_ACUITY_SCORE: 36
ADLS_ACUITY_SCORE: 38
ADLS_ACUITY_SCORE: 38
ADLS_ACUITY_SCORE: 36
ADLS_ACUITY_SCORE: 38
ADLS_ACUITY_SCORE: 38
ADLS_ACUITY_SCORE: 36
ADLS_ACUITY_SCORE: 38
ADLS_ACUITY_SCORE: 38
ADLS_ACUITY_SCORE: 36

## 2025-07-09 NOTE — PROGRESS NOTES
Neurosurgery Brief Progress Note    Hemovac drain removal    Drain not deemed to be necessary with low output. Drain site was prepped in usual sterile fashion with chloraprep. The drain was taken off suction. The sutures securing the drain were cut and the site was re-prepped. The drain was removed with tip intact. No immediate complication was observed and the patient tolerated the procedure well.     RENO Fox, CNP  Neurosurgery  Pager [phone]

## 2025-07-09 NOTE — PROGRESS NOTES
"Pain Service Progress Note  [facility]  Date: 07/09/2025       Patient Name: Teresa Sanderson  MRN: [id number]  Age: 49 year old  Sex: female      Assessment:  Teresa Sanderson is a 49 year old female who has PMH of former smoker, anemia, history of DVT/chronic DVT, migraine, obesity, schizoaffective disorder, bipolar disorder, chronic pain on chronic opioids and metastatic breast cancer with pathologic fracture. She has had previous radiofrequency ablation kyphoplasty in 2021 as well as radiation therapy.   She is  s/p Lumbar 3-5 fusion on 7/7/25 with neurosurgery and with diffficult to manage pain control.     PTA was on Belbuca 600mcg film Q 12 hours and oxycodone 10mg 5-6x/day (although not quite sure today what she takes at home) by outpatient palliative service.    Today, Teresa is awake, alert and conversant.  Much improved from yesterday: valium being held,  PCA dilaudid being discontinued and less PO oxycodone.  States pain is located in the lower back and rates pain level at \"100/10.\"  Appears in NAD-conversant, engaged in conversation.  RN at the bedside reports that Teresa had been sleepy yesterday and overnight thus pain medications were judiciously given.     Discussed balancing pain relief with pain medications while minimizing/avoiding side effects.    Plan/Recommendations:  Acetaminophen 975mg PO 8 hours  Robaxin 500mg PO Q 6 hours PRN  Continue PTA dose of Belbuca 600mcg film Q 12 hours  Oxycodone 5-10mg PO Q 3 hours PRN    -if needed may increase to 10-15mg PO Q 3-4 hours PRN if not sedated and pain not adequately controlled with above.   IV Dilaudid 0.2-0.4mg IV Q 2 hours PRN  Change Gabapentin 100mg, 100mg and 300mg at bedtime  Lidocaine patches 1-3 patches Q 24 hours, 12 hours on and 12 hours off  Menthol patches, 1 patch TD Q 8 hours  Bowel regimen     Pain Service will continue to follow.    Discussed with attending anesthesiologist- the context of our " "conversation was decreasing opioids, avoiding benzodiazepine due to sedation concerns    Chayo Adorno PA-C  07/09/2025       Diet: Advance Diet as Tolerated: Regular Diet Adult    Relevant Labs:  Recent Labs   Lab Test 07/09/25  0622 03/04/25  1111 02/18/25  1008 07/09/24  1419 07/01/24  1330   INR  --   --  0.96  --  1.11      < >  --    < > 167   PTT  --   --   --   --  25   BUN 13.1   < >  --    < > 12.2    < > = values in this interval not displayed.       Physical Exam:  Vitals: /65 (BP Location: Right arm)   Pulse 67   Temp 98.2  F (36.8  C) (Oral)   Resp 16   Ht 1.829 m (6')   Wt 103.6 kg (228 lb 6.3 oz)   SpO2 95%   BMI 30.98 kg/m      Physical Exam:   CONSTITUTIONAL/GENERAL APPEARANCE: Conversant.  EYES: EOMI, sclerae clear  ENT/NECK: neck is supple  RESPIRATORY:non labored breathing, on room air  CARDIOVASCULAR: HR within normal limits  GI:soft, nontender,   MUSCULOSKELETAL/BACK/SPINE/EXTREMITIES: Moving UE and LE independently.     NEURO:  awake, alert, conversant,  SKIN/VASCULAR EXAM:  Dry and warm.  PSYCHIATRIC/BEHAVIORAL/OBSERVATIONS:  No objective signs of pain observed during our interview.   Judgment/Insight -fair   Orientation - x3   Memory -fair   Mood and affect - calm, pleasant, cooperative         Relevant Medications:  Current Pain Medications:  Medications related to Pain Management (From now, onward)      Start     Dose/Rate Route Frequency Ordered Stop    07/10/25 0000  bisacodyl (DULCOLAX) suppository 10 mg         10 mg Rectal DAILY PRN 07/07/25 2015 07/09/25 1200  methocarbamol (ROBAXIN) tablet 750 mg         750 mg Oral 4 TIMES DAILY 07/09/25 1116 07/09/25 1115  oxyCODONE (ROXICODONE) tablet 5 mg        Placed in \"Or\" Linked Group    5 mg Oral EVERY 3 HOURS PRN 07/09/25 1116 07/09/25 1115  oxyCODONE IR (ROXICODONE) tablet 10 mg        Placed in \"Or\" Linked Group    10 mg Oral EVERY 3 HOURS PRN 07/09/25 1116 07/09/25 1115  HYDROmorphone " "(DILAUDID) injection 0.2 mg         0.2 mg Intravenous EVERY 4 HOURS PRN 07/09/25 1116      07/09/25 0000  magnesium hydroxide (MILK OF MAGNESIA) suspension 30 mL         30 mL Oral DAILY PRN 07/07/25 2015 07/08/25 0830  Buprenorphine HCl (BELBUCA) buccal film 600 mcg        Note to Pharmacy: PTA Sig:Place 1 Film (300 mcg) inside cheek every 12 hours.      600 mcg Buccal EVERY 12 HOURS 07/07/25 2131 07/08/25 0830  polyethylene glycol (MIRALAX) Packet 17 g         17 g Oral 3 TIMES DAILY 07/08/25 0814      07/08/25 0813  hydrOXYzine HCl (ATARAX) tablet 50 mg        Note to Pharmacy: PTA Sig:Take 1 tablet (25 mg) by mouth 4 times daily as needed for anxiety or other (panic).  Patient taking differently: Take 25 mg by mouth every morning.      50 mg Oral 4 TIMES DAILY PRN 07/08/25 0813 07/07/25 2030  gabapentin (NEURONTIN) capsule 300 mg        Note to Pharmacy: PTA Sig:Take 1 capsule (300 mg) by mouth 3 times daily.      300 mg Oral 3 TIMES DAILY 07/07/25 2015 07/07/25 2030  acetaminophen (TYLENOL) tablet 975 mg         975 mg Oral EVERY 8 HOURS 07/07/25 2015 07/07/25 2030  Lidocaine (LIDOCARE) 4 % Patch 2 patch         2 patch  over 12 Hours Transdermal EVERY 24 HOURS 2000 07/07/25 2015 07/07/25 2015  lidocaine 1 % 0.1-1 mL         0.1-1 mL Other EVERY 1 HOUR PRN 07/07/25 2015 07/07/25 2015  lidocaine (LMX4) cream          Topical EVERY 1 HOUR PRN 07/07/25 2015 07/07/25 2000  senna-docusate (SENOKOT-S/PERICOLACE) 8.6-50 MG per tablet 2 tablet        Placed in \"Or\" Linked Group    2 tablet Oral 2 TIMES DAILY 07/07/25 1902              Primary Service Contacted with Recommendations? Yes            Acute Inpatient Pain Service [facility]  Hours of pain coverage 24/7   Please Page via Vocera  - Link to Vocera Here - Search Pain  Page via Amcom- Please Page the Pain Team Via Amcom: \"PAIN MANAGEMENT ACUTE INPATIENT/ [facility]\"            "

## 2025-07-09 NOTE — PROGRESS NOTES
Inpatient Diabetes Progress Note  Patient: Teresa Sanderson   MRN: [id number]  Date of Service: 07/09/2025    Assessment and plan  49 year old female with multiple chronic comorbidites and recent L3-L5 spinal fusion, now with new onset severe hyperglycemia in the setting of poorly controlled or previously undiagnosed diabetes mellitus (A1C 10.5%).       Diabetes mellitus with hyperglycemia, unstable.  Presumably she has DM2 but DM1 is also in the differential given how rapidly her A1c deteriorated.   Given the degree of hyperglycemia, she will require insulin treatment after discharge.   Stress hyperglycemia post operative from L spine surgery  Steroid hyperglycemia due to dexamethasone given with the operation   Weight loss due to # 1       -- We will continue the IV regular insulin drip for now, given ongoing variability in her glucose levels. Our immediate goal is to achieve glycemic stability over a sustained period (6-8 hours), which would allow us to safely transition her to a subcutaneous basal-bolus regimen.   -- She received 15 units of Lantus (glargine) yesterday as an initial basal dose; however, this was insufficient to control her glucose levels, as reflected by persistently elevated readings. Therefore, we will increase the basal dose to 30 units today.  -- For prandial insulin, we initially started a carbohydrate coverage of insulin aspart at 1 unit per 5 grams of carbohydrates, but this too appears to be inadequate. Given her ongoing hyperglycemia and improving but still elevated insulin requirements, we will intensify her carbohydrate coverage ratio to 1 unit per 4 grams of carbohydrates today, administered TID with meals and snacks.  -- Please send diabetes education consult to teach her insulin and blood sugar monitoring  -- She will require outpatient follow-up with a diabetes care provider upon discharge to ensure continued glycemic control and education reinforcement.    Patient was discussed  with endocrinology attending Dr Spence    Subjective:  Patient was seen at bedside today. Patient reports of no acute issues.    Physical Examination:  /59 (BP Location: Left arm)   Pulse 69   Temp 97.5  F (36.4  C) (Oral)   Resp 15   Ht 1.829 m (6')   Wt 103.6 kg (228 lb 6.3 oz)   SpO2 100%   BMI 30.98 kg/m      Physical Exam   General:  well appearing, in no acute distress.  HEENT:  NC/AT. MMM, sclera not injected; partially edentulous  CARDIAC: RRR S1 S2 without murmurs, rubs, gallops  Lungs:  breathing unlabored;   ABD:  rounded, soft, non-tender  Skin:  warm and dry, no obvious lesions  MSK:   moving all extremities  Lymp:   No LE edema  Mental Status:  Alert and oriented x3  Psych:   Cooperative, good eye contact, full/appropriate affect  Feet:    warm ; scaling skin plantar surface    Most Recent 6 glucoses:  Recent Labs   Lab Test 07/09/25  1700 07/09/25  1506 07/09/25  1356 07/09/25  1300 07/09/25  1201 07/09/25  1103   * 109* 142* 122* 132* 169*     Attending tie-in statement: Chart reviewed, discussed with fellow whose note I have reviewed, amended and with which I agree.   Veronica Spence MD
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
## 2025-07-09 NOTE — CONSULTS
Discharge Pharmacy Test Claim    Patient's Medicare Part B and [place] Medicaid secondary covers any brand glucometer and supplies as long as patient meets Medicare Part B's diabetes criteria. Patient's [facility] Medicare Part D plan covers humalog pens and lantus pens with $0 copays.    Test Claim Copay   glucometer 0.00   humalog kwikpens 0.00   insulin aspart flexpens not covered   insulin lispro kwikpens generic not covered   lantus solostar pens 0.00     Liz Ponce  [facility] Pharmacy Liaison, [place]  Ph: [phone]  Fax: [phone]  Available on Teams and Vocera  Disclaimer: Pharmacy test claims are estimates and may not reflect final costs. Suggested alternatives aim to be cost-effective and may not be therapeutically equivalent. This consult is informational and does not constitute medical advice. Clinical decisions should be made by qualified healthcare providers.

## 2025-07-09 NOTE — PLAN OF CARE
Status: POD #2 L3-5 fusion with pedicle screws. Stay c/b by pain and insulin drip (started 7/7). Hx of metastatic breast CA, smoker, DVT, schizoaffective disorder, bipolar disorder, chronic pain.   Vitals: VSS on 1-2L NC, weaning as able.   Neuros: Lethargic throughout shift but arouses to touch/voice. A&Ox4. 4-5/5 throughout with GW. Baseline N/T to BLE   IV: R. PIVx2 with one SL and other infusing insulin gtt via algorithm 4   Labs/Electrolytes: BG q1 for insulin gtt.  Resp: Continuous pulse ox in place   Diet: Regular diet with carbv counts/coverage   GI: LBM 7/7. Has scheduled senna and miralax   : VDSP to commode when alert, purewick in place overnight for safety due to lethargy  Skin: Back incision with marked primpapore and hemovac x1 with 34mL output. Lidocaine patches to back incision   Pain: Back pain managed with scheduled tylenol   Activity: A1-2/GB/Walker to pivot. PCDs in place   Plan: Ancef while hemovac in place. PT recommending ARU. Continue to monitor and follow POC     Goal Outcome Evaluation:      Plan of Care Reviewed With: patient    Overall Patient Progress: improving    Outcome Evaluation: Hemovac remains in place. PT recommending ARU

## 2025-07-09 NOTE — PROGRESS NOTES
[facility], [place]   Neurosurgery Progress Note:    Date of service: 7/9/2025    Assessment: Teresa Sanderson is a 49 year old female s/p o-arm/stealth assisted Lumbar 3-5 fusion with pedicle screws on 7/7/2025 with Dr. Goodrich.    Clinically Significant Risk Factors Present on Admission            # Hypomagnesemia: Lowest Mg = 1.6 mg/dL in last 2 days, will replace as needed                 # DMII: A1C = 10.5 % (Ref range: <5.7 %) within past 6 months, PRESENT ON ADMISSION  # Obesity: Estimated body mass index is 30.98 kg/m  as calculated from the following:    Height as of this encounter: 1.829 m (6').    Weight as of this encounter: 103.6 kg (228 lb 6.3 oz)., PRESENT ON ADMISSION          Plan:  - Neuro checks/vital signs: Every 4 hours  - Pain control: PO robaxin,  and PRN IV dailudid, PO Oxycodone and atarax   - Activity: up as tolerated  - Restrictions/Bracing: none  - Diet: Regular  - Drain: Hemovac drain, plan to remove today  - Anti-coagulation: HOLD Xarelto until POD 14  - DVT prophylaxis: SCDs & subcutaneous heparin   - Imaging:  UXR when able  - PT/OT: ordered  - Pain Team Consult - appreciate recommendations   - Insulin gtt  - Endocrinology Diabetes Consult - appreciate recommendations     Interval History:  Patient reporting pain controlled. Plan to remove HV drain.  UXR when able.  PT/OT recommending ARU           Objective:   Temp:  [98  F (36.7  C)-98.2  F (36.8  C)] 98.2  F (36.8  C)  Pulse:  [67-83] 67  Resp:  [15-16] 16  BP: (111-114)/(61-87) 114/65  SpO2:  [93 %-96 %] 95 %  I/O last 3 completed shifts:  In: 2254.5 [P.O.:2180; I.V.:24.5; IV Piggyback:50]  Out: 316 [Urine:250; Drains:66]    Gen: Appears comfortable, NAD  Wound: Incision, clean, dry, intact without strikethrough  Neurologic:  - Alert & Oriented to person, place, time, and situation  - Follows commands briskly  - Speech fluent, spontaneous. No aphasia or dysarthria.  - No gaze preference. No apparent  hemineglect.  - PERRL, EOMI  - Strong eye closure, jaw clench, and cheek puff  - Face symmetric with sensation intact to light touch  - Palate elevates symmetrically, uvula midline, tongue protrudes midline  - Trapezii and sternocleidomastoid muscles 5/5 bilaterally  - No pronator drift     Del Tr Bi WE WF Gr   R 5 5 5 5 5 5   L 5 5 5 5 5 5    HF KE KF DF PF EHL   R 5 5 5 5 5 5   L 5 5 5 5 5 5     Reflexes 2+ throughout    Sensation intact and symmetric to light touch throughout    LABS  Recent Labs   Lab Test 07/09/25  0622 07/08/25  0547 06/23/25  1250   WBC 6.3 7.7 3.5*   HGB 8.8* 9.3* 9.9*   MCV 88 84 89    225 165       Recent Labs   Lab Test 07/09/25  0910 07/09/25  0804 07/09/25  0704 07/09/25 0622 07/08/25  0647 07/08/25  0547 07/07/25  0950 06/20/25  1215   NA  --   --   --  138  --  139  --  143   POTASSIUM  --   --   --  4.5  --  4.2  --  3.5   CHLORIDE  --   --   --  103  --  104  --  106   CO2  --   --   --  25  --  21*  --  24   BUN  --   --   --  13.1  --  8.7  --  8.6   CR  --   --   --  0.90  --  0.78  --  0.78   ANIONGAP  --   --   --  10  --  14  --  13   NANDO  --   --   --  9.9  --  10.0  --  9.2   * 119* 147* 142*   < > 124*   < > 196*    < > = values in this interval not displayed.

## 2025-07-10 ENCOUNTER — APPOINTMENT (OUTPATIENT)
Dept: GENERAL RADIOLOGY | Facility: CLINIC | Age: 50
DRG: 543 | End: 2025-07-10
Payer: MEDICARE

## 2025-07-10 ENCOUNTER — APPOINTMENT (OUTPATIENT)
Dept: PHYSICAL THERAPY | Facility: CLINIC | Age: 50
DRG: 543 | End: 2025-07-10
Attending: NEUROLOGICAL SURGERY
Payer: MEDICARE

## 2025-07-10 ENCOUNTER — APPOINTMENT (OUTPATIENT)
Dept: OCCUPATIONAL THERAPY | Facility: CLINIC | Age: 50
DRG: 543 | End: 2025-07-10
Attending: NEUROLOGICAL SURGERY
Payer: MEDICARE

## 2025-07-10 ENCOUNTER — APPOINTMENT (OUTPATIENT)
Dept: CT IMAGING | Facility: CLINIC | Age: 50
DRG: 543 | End: 2025-07-10
Attending: NURSE PRACTITIONER
Payer: MEDICARE

## 2025-07-10 LAB
ALBUMIN SERPL BCG-MCNC: 3.4 G/DL (ref 3.5–5.2)
ALP SERPL-CCNC: 140 U/L (ref 40–150)
ALT SERPL W P-5'-P-CCNC: 40 U/L (ref 0–50)
AMMONIA PLAS-SCNC: 34 UMOL/L (ref 11–51)
ANION GAP SERPL CALCULATED.3IONS-SCNC: 10 MMOL/L (ref 7–15)
ANION GAP SERPL CALCULATED.3IONS-SCNC: 9 MMOL/L (ref 7–15)
AST SERPL W P-5'-P-CCNC: 56 U/L (ref 0–45)
ATRIAL RATE - MUSE: 86 BPM
BASE EXCESS BLDV CALC-SCNC: 2.5 MMOL/L (ref -3–3)
BILIRUB SERPL-MCNC: 0.3 MG/DL
BUN SERPL-MCNC: 11.6 MG/DL (ref 6–20)
BUN SERPL-MCNC: 11.8 MG/DL (ref 6–20)
CALCIUM SERPL-MCNC: 9.7 MG/DL (ref 8.8–10.4)
CALCIUM SERPL-MCNC: 9.7 MG/DL (ref 8.8–10.4)
CHLORIDE SERPL-SCNC: 101 MMOL/L (ref 98–107)
CHLORIDE SERPL-SCNC: 104 MMOL/L (ref 98–107)
CREAT SERPL-MCNC: 0.63 MG/DL (ref 0.51–0.95)
CREAT SERPL-MCNC: 0.69 MG/DL (ref 0.51–0.95)
DIASTOLIC BLOOD PRESSURE - MUSE: NORMAL MMHG
EGFRCR SERPLBLD CKD-EPI 2021: >90 ML/MIN/1.73M2
EGFRCR SERPLBLD CKD-EPI 2021: >90 ML/MIN/1.73M2
ERYTHROCYTE [DISTWIDTH] IN BLOOD BY AUTOMATED COUNT: 14.6 % (ref 10–15)
ERYTHROCYTE [DISTWIDTH] IN BLOOD BY AUTOMATED COUNT: 14.6 % (ref 10–15)
GLUCOSE BLDC GLUCOMTR-MCNC: 100 MG/DL (ref 70–99)
GLUCOSE BLDC GLUCOMTR-MCNC: 104 MG/DL (ref 70–99)
GLUCOSE BLDC GLUCOMTR-MCNC: 106 MG/DL (ref 70–99)
GLUCOSE BLDC GLUCOMTR-MCNC: 108 MG/DL (ref 70–99)
GLUCOSE BLDC GLUCOMTR-MCNC: 109 MG/DL (ref 70–99)
GLUCOSE BLDC GLUCOMTR-MCNC: 115 MG/DL (ref 70–99)
GLUCOSE BLDC GLUCOMTR-MCNC: 125 MG/DL (ref 70–99)
GLUCOSE BLDC GLUCOMTR-MCNC: 131 MG/DL (ref 70–99)
GLUCOSE BLDC GLUCOMTR-MCNC: 155 MG/DL (ref 70–99)
GLUCOSE BLDC GLUCOMTR-MCNC: 166 MG/DL (ref 70–99)
GLUCOSE BLDC GLUCOMTR-MCNC: 171 MG/DL (ref 70–99)
GLUCOSE BLDC GLUCOMTR-MCNC: 174 MG/DL (ref 70–99)
GLUCOSE BLDC GLUCOMTR-MCNC: 178 MG/DL (ref 70–99)
GLUCOSE BLDC GLUCOMTR-MCNC: 180 MG/DL (ref 70–99)
GLUCOSE BLDC GLUCOMTR-MCNC: 183 MG/DL (ref 70–99)
GLUCOSE BLDC GLUCOMTR-MCNC: 184 MG/DL (ref 70–99)
GLUCOSE BLDC GLUCOMTR-MCNC: 252 MG/DL (ref 70–99)
GLUCOSE BLDC GLUCOMTR-MCNC: 81 MG/DL (ref 70–99)
GLUCOSE BLDC GLUCOMTR-MCNC: 87 MG/DL (ref 70–99)
GLUCOSE BLDC GLUCOMTR-MCNC: 95 MG/DL (ref 70–99)
GLUCOSE SERPL-MCNC: 106 MG/DL (ref 70–99)
GLUCOSE SERPL-MCNC: 107 MG/DL (ref 70–99)
HCO3 BLDV-SCNC: 29 MMOL/L (ref 21–28)
HCO3 SERPL-SCNC: 26 MMOL/L (ref 22–29)
HCO3 SERPL-SCNC: 26 MMOL/L (ref 22–29)
HCT VFR BLD AUTO: 27.6 % (ref 35–47)
HCT VFR BLD AUTO: 29.2 % (ref 35–47)
HGB BLD-MCNC: 8.5 G/DL (ref 11.7–15.7)
HGB BLD-MCNC: 8.9 G/DL (ref 11.7–15.7)
INTERPRETATION ECG - MUSE: NORMAL
LACTATE SERPL-SCNC: 1.9 MMOL/L (ref 0.7–2)
MAGNESIUM SERPL-MCNC: 1.3 MG/DL (ref 1.7–2.3)
MAGNESIUM SERPL-MCNC: 1.3 MG/DL (ref 1.7–2.3)
MAGNESIUM SERPL-MCNC: 1.7 MG/DL (ref 1.7–2.3)
MCH RBC QN AUTO: 27.1 PG (ref 26.5–33)
MCH RBC QN AUTO: 27.2 PG (ref 26.5–33)
MCHC RBC AUTO-ENTMCNC: 30.5 G/DL (ref 31.5–36.5)
MCHC RBC AUTO-ENTMCNC: 30.8 G/DL (ref 31.5–36.5)
MCV RBC AUTO: 88 FL (ref 78–100)
MCV RBC AUTO: 89 FL (ref 78–100)
O2/TOTAL GAS SETTING VFR VENT: 1 %
OXYHGB MFR BLDV: 71 % (ref 70–75)
P AXIS - MUSE: 40 DEGREES
PCO2 BLDV: 54 MM HG (ref 40–50)
PH BLDV: 7.34 [PH] (ref 7.32–7.43)
PHOSPHATE SERPL-MCNC: 3.9 MG/DL (ref 2.5–4.5)
PLATELET # BLD AUTO: 161 10E3/UL (ref 150–450)
PLATELET # BLD AUTO: 182 10E3/UL (ref 150–450)
PO2 BLDV: 42 MM HG (ref 25–47)
POTASSIUM SERPL-SCNC: 4.2 MMOL/L (ref 3.4–5.3)
POTASSIUM SERPL-SCNC: 4.2 MMOL/L (ref 3.4–5.3)
PR INTERVAL - MUSE: 156 MS
PROT SERPL-MCNC: 6.8 G/DL (ref 6.4–8.3)
QRS DURATION - MUSE: 90 MS
QT - MUSE: 354 MS
QTC - MUSE: 423 MS
R AXIS - MUSE: 11 DEGREES
RBC # BLD AUTO: 3.13 10E6/UL (ref 3.8–5.2)
RBC # BLD AUTO: 3.28 10E6/UL (ref 3.8–5.2)
SAO2 % BLDV: 72.4 % (ref 70–75)
SODIUM SERPL-SCNC: 137 MMOL/L (ref 135–145)
SODIUM SERPL-SCNC: 139 MMOL/L (ref 135–145)
SYSTOLIC BLOOD PRESSURE - MUSE: NORMAL MMHG
T AXIS - MUSE: 20 DEGREES
TROPONIN T SERPL HS-MCNC: <6 NG/L
VENTRICULAR RATE- MUSE: 86 BPM
WBC # BLD AUTO: 5.3 10E3/UL (ref 4–11)
WBC # BLD AUTO: 5.9 10E3/UL (ref 4–11)

## 2025-07-10 PROCEDURE — 80069 RENAL FUNCTION PANEL: CPT

## 2025-07-10 PROCEDURE — 84100 ASSAY OF PHOSPHORUS: CPT

## 2025-07-10 PROCEDURE — 250N000012 HC RX MED GY IP 250 OP 636 PS 637

## 2025-07-10 PROCEDURE — 999N000127 HC STATISTIC PERIPHERAL IV START W US GUIDANCE

## 2025-07-10 PROCEDURE — 36415 COLL VENOUS BLD VENIPUNCTURE: CPT | Performed by: NEUROLOGICAL SURGERY

## 2025-07-10 PROCEDURE — 120N000002 HC R&B MED SURG/OB UMMC

## 2025-07-10 PROCEDURE — 82140 ASSAY OF AMMONIA: CPT

## 2025-07-10 PROCEDURE — 84484 ASSAY OF TROPONIN QUANT: CPT

## 2025-07-10 PROCEDURE — 250N000013 HC RX MED GY IP 250 OP 250 PS 637

## 2025-07-10 PROCEDURE — 250N000009 HC RX 250

## 2025-07-10 PROCEDURE — 83735 ASSAY OF MAGNESIUM: CPT | Performed by: NURSE PRACTITIONER

## 2025-07-10 PROCEDURE — 83605 ASSAY OF LACTIC ACID: CPT | Performed by: NEUROLOGICAL SURGERY

## 2025-07-10 PROCEDURE — 83735 ASSAY OF MAGNESIUM: CPT

## 2025-07-10 PROCEDURE — 36415 COLL VENOUS BLD VENIPUNCTURE: CPT

## 2025-07-10 PROCEDURE — 71045 X-RAY EXAM CHEST 1 VIEW: CPT

## 2025-07-10 PROCEDURE — 99232 SBSQ HOSP IP/OBS MODERATE 35: CPT

## 2025-07-10 PROCEDURE — 97530 THERAPEUTIC ACTIVITIES: CPT | Mod: GP

## 2025-07-10 PROCEDURE — 85027 COMPLETE CBC AUTOMATED: CPT

## 2025-07-10 PROCEDURE — 250N000013 HC RX MED GY IP 250 OP 250 PS 637: Performed by: NURSE PRACTITIONER

## 2025-07-10 PROCEDURE — 250N000011 HC RX IP 250 OP 636

## 2025-07-10 PROCEDURE — 82805 BLOOD GASES W/O2 SATURATION: CPT

## 2025-07-10 PROCEDURE — 70450 CT HEAD/BRAIN W/O DYE: CPT

## 2025-07-10 PROCEDURE — 99232 SBSQ HOSP IP/OBS MODERATE 35: CPT | Performed by: PHYSICIAN ASSISTANT

## 2025-07-10 PROCEDURE — 97535 SELF CARE MNGMENT TRAINING: CPT | Mod: GO

## 2025-07-10 PROCEDURE — 72082 X-RAY EXAM ENTIRE SPI 2/3 VW: CPT | Mod: 26 | Performed by: RADIOLOGY

## 2025-07-10 PROCEDURE — 84155 ASSAY OF PROTEIN SERUM: CPT

## 2025-07-10 PROCEDURE — 36415 COLL VENOUS BLD VENIPUNCTURE: CPT | Performed by: NURSE PRACTITIONER

## 2025-07-10 PROCEDURE — 93005 ELECTROCARDIOGRAM TRACING: CPT

## 2025-07-10 PROCEDURE — 71045 X-RAY EXAM CHEST 1 VIEW: CPT | Mod: 26 | Performed by: RADIOLOGY

## 2025-07-10 PROCEDURE — 85041 AUTOMATED RBC COUNT: CPT

## 2025-07-10 PROCEDURE — 70450 CT HEAD/BRAIN W/O DYE: CPT | Mod: 26 | Performed by: RADIOLOGY

## 2025-07-10 PROCEDURE — 250N000011 HC RX IP 250 OP 636: Performed by: NURSE PRACTITIONER

## 2025-07-10 PROCEDURE — 999N000157 HC STATISTIC RCP TIME EA 10 MIN

## 2025-07-10 PROCEDURE — 97530 THERAPEUTIC ACTIVITIES: CPT | Mod: GO

## 2025-07-10 PROCEDURE — 999N000065 XR SPINE COMPLETE SCOLIOSIS 2 VIEWS

## 2025-07-10 RX ORDER — MAGNESIUM SULFATE HEPTAHYDRATE 40 MG/ML
4 INJECTION, SOLUTION INTRAVENOUS ONCE
Status: DISCONTINUED | OUTPATIENT
Start: 2025-07-10 | End: 2025-07-10

## 2025-07-10 RX ORDER — GABAPENTIN 100 MG/1
100 CAPSULE ORAL 2 TIMES DAILY
Status: DISCONTINUED | OUTPATIENT
Start: 2025-07-10 | End: 2025-07-15 | Stop reason: HOSPADM

## 2025-07-10 RX ORDER — MAGNESIUM SULFATE HEPTAHYDRATE 40 MG/ML
4 INJECTION, SOLUTION INTRAVENOUS ONCE
Status: COMPLETED | OUTPATIENT
Start: 2025-07-10 | End: 2025-07-10

## 2025-07-10 RX ORDER — METHOCARBAMOL 500 MG/1
500 TABLET, FILM COATED ORAL 4 TIMES DAILY PRN
Status: DISCONTINUED | OUTPATIENT
Start: 2025-07-10 | End: 2025-07-10

## 2025-07-10 RX ORDER — QUETIAPINE FUMARATE 400 MG/1
400 TABLET, FILM COATED ORAL AT BEDTIME
Status: DISCONTINUED | OUTPATIENT
Start: 2025-07-10 | End: 2025-07-15 | Stop reason: HOSPADM

## 2025-07-10 RX ORDER — GABAPENTIN 300 MG/1
300 CAPSULE ORAL AT BEDTIME
Status: DISCONTINUED | OUTPATIENT
Start: 2025-07-10 | End: 2025-07-15 | Stop reason: HOSPADM

## 2025-07-10 RX ORDER — OXYCODONE HYDROCHLORIDE 5 MG/1
5 TABLET ORAL EVERY 4 HOURS PRN
Refills: 0 | Status: DISCONTINUED | OUTPATIENT
Start: 2025-07-10 | End: 2025-07-15 | Stop reason: HOSPADM

## 2025-07-10 RX ADMIN — BUPRENORPHINE HYDROCHLORIDE 600 MCG: 600 FILM, SOLUBLE BUCCAL at 20:24

## 2025-07-10 RX ADMIN — HEPARIN SODIUM 5000 UNITS: 5000 INJECTION, SOLUTION INTRAVENOUS; SUBCUTANEOUS at 18:17

## 2025-07-10 RX ADMIN — OXYCODONE HYDROCHLORIDE 5 MG: 5 TABLET ORAL at 23:14

## 2025-07-10 RX ADMIN — GABAPENTIN 300 MG: 300 CAPSULE ORAL at 22:47

## 2025-07-10 RX ADMIN — QUETIAPINE FUMARATE 400 MG: 400 TABLET ORAL at 22:47

## 2025-07-10 RX ADMIN — LIDOCAINE 2 PATCH: 4 PATCH TOPICAL at 20:27

## 2025-07-10 RX ADMIN — OXYCODONE HYDROCHLORIDE 10 MG: 10 TABLET ORAL at 04:48

## 2025-07-10 RX ADMIN — GABAPENTIN 100 MG: 100 CAPSULE ORAL at 20:24

## 2025-07-10 RX ADMIN — HEPARIN SODIUM 5000 UNITS: 5000 INJECTION, SOLUTION INTRAVENOUS; SUBCUTANEOUS at 04:18

## 2025-07-10 RX ADMIN — GABAPENTIN 100 MG: 100 CAPSULE ORAL at 15:59

## 2025-07-10 RX ADMIN — MAGNESIUM SULFATE HEPTAHYDRATE 4 G: 40 INJECTION, SOLUTION INTRAVENOUS at 16:01

## 2025-07-10 RX ADMIN — OXYCODONE HYDROCHLORIDE 5 MG: 5 TABLET ORAL at 18:17

## 2025-07-10 RX ADMIN — ACETAMINOPHEN 975 MG: 325 TABLET ORAL at 20:24

## 2025-07-10 RX ADMIN — ACETAMINOPHEN 975 MG: 325 TABLET ORAL at 04:18

## 2025-07-10 RX ADMIN — FAMOTIDINE 20 MG: 20 TABLET, FILM COATED ORAL at 20:24

## 2025-07-10 RX ADMIN — INSULIN GLARGINE 60 UNITS: 100 INJECTION, SOLUTION SUBCUTANEOUS at 10:25

## 2025-07-10 RX ADMIN — HEPARIN SODIUM 5000 UNITS: 5000 INJECTION, SOLUTION INTRAVENOUS; SUBCUTANEOUS at 10:25

## 2025-07-10 RX ADMIN — INSULIN HUMAN 3 UNITS/HR: 1 INJECTION, SOLUTION INTRAVENOUS at 12:21

## 2025-07-10 RX ADMIN — INSULIN ASPART 3 UNITS: 100 INJECTION, SOLUTION INTRAVENOUS; SUBCUTANEOUS at 22:47

## 2025-07-10 ASSESSMENT — ACTIVITIES OF DAILY LIVING (ADL)
ADLS_ACUITY_SCORE: 38
ADLS_ACUITY_SCORE: 44
ADLS_ACUITY_SCORE: 44
ADLS_ACUITY_SCORE: 38
ADLS_ACUITY_SCORE: 44
ADLS_ACUITY_SCORE: 38
ADLS_ACUITY_SCORE: 44
ADLS_ACUITY_SCORE: 38
ADLS_ACUITY_SCORE: 38
ADLS_ACUITY_SCORE: 44
ADLS_ACUITY_SCORE: 38
ADLS_ACUITY_SCORE: 44
ADLS_ACUITY_SCORE: 44
ADLS_ACUITY_SCORE: 38

## 2025-07-10 NOTE — PROGRESS NOTES
Inpatient Diabetes Management Service: Daily Progress Note     HPI: 49 year old female with multiple chronic comorbidites and recent L3-L5 spinal fusion, now with new onset severe hyperglycemia in the setting of poorly controlled or previously undiagnosed diabetes mellitus (A1C 10.5%).     During this hospitalization she has been on IV insulin since 7/7.  We have been overlapping it with subcutaneous prandial aspart     Total daily insulin  7/9/25 222 units  7/8/25 245 units    Diabetes relevant meds:   Iv insulin  Aspart 1 unit/4 grams with meals and snacks  Glargine 30 units/day    ROS:   Eating OK  Has been up to the bathroom  Pain with boosting in bed (which was just being done when I entered the room)   Hasn't given herself a shot of insulin yet          Assessment/Plan:     Assessment:   DM2 with hyperglycemia and high insulin requirement  Stress hyperglycemia  Steroid hyperglycemia due to dexamethasone given     Plan/Recommendations:   Increase prandial aspart from 1 unit/4 grams of carb to 1 unit/3 grams of carb  Increase glargine from 30 to 60 units /day   Diabetes education should be given on insulin administration and carb counting       1827 PM addendum  Patient didn't eat all day toady per her nurse .  She didn't tell me this when I visited her at 1730.  She is getting her first meal of the day now for dinner . The iv insulin requirement has decreased to the point of being off for the last 2 hours .       Please notify Inpatient Diabetes Service if changes are planned to steroids, nutrition, TPN/TF and anticipated procedures requiring prolonged NPO status.         Interval History/Review of Systems :   Planned Procedures/Surgeries: none  Inpatient Glucose Control:   Recent Labs   Lab 07/10/25  0705 07/10/25  0601 07/10/25  0506 07/10/25  0408 07/10/25  0316 07/10/25  0201   * 174* 184* 183* 178* 171*           Medications Impacting Glycemia:   Steroids:   none  D5W-containing solutions/medications: none  Other medications impacting glucose: none        Nutrition:   Orders Placed This Encounter      Advance Diet as Tolerated: Regular Diet Adult          Diabetes History: see full consult note for complete diabetes history   Diabetes Type and Duration: new diagnosis;   PTA Medication Regimen: none  Historical Diabetes Medications: none  Outpatient Diabetes Provider: none  Formal Diabetes Education/Educator: none        Physical Exam: approx 1725 PM    BP (P) 103/80 (BP Location: Left arm)   Pulse (P) 88   Temp (P) 98.6  F (37  C) (Oral)   Resp (P) 16   Ht 1.829 m (6')   Wt 103.6 kg (228 lb 6.3 oz)   SpO2 96%   BMI 30.98 kg/m    GENERAL: no distress; lying in bed   SKIN: Visible skin clear.   EYES: Eyes grossly normal to inspection.    NECK: no visible masses; no grossly visible goiter  RESP: No audible wheeze, cough, or increased work of breathing.    NEURO: Awake, alert, responds appropriately to questions.  Mentation and speech fluent.  PSYCH:affect normal,and appearance well-groomed.           Data:       Inpatient Diabetes Service will continue to follow, please don't hesitate to contact the team with any questions or concerns.     Veronica Spence MD      To contact Inpatient Diabetes Service:     7 AM - 5 PM: Page the IDS ALEKSANDRA following the patient that day (see filed or incomplete progress notes/consult notes under Endocrinology)    OR if uncertain of provider assignment: page job code [phone]    5 PM - 7 AM: First call after hours is to primary service.    For urgent after-hours questions, page job code for on call fellow: [phone]

## 2025-07-10 NOTE — PLAN OF CARE
Goal Outcome Evaluation:      Plan of Care Reviewed With: patient, child    Overall Patient Progress: no change    Status:  POD2 L3-5 fusion w/ pedicle screws, stay c/b pain and insulin gtt (started 7/7). PMHx of metastatic breast cancer, smoker, DV, schizoaffective disorder, bipolar disorder, chronic pain.  Vitals: VSS 1L NC  Neuros: A/Ox4. 5/5 strengths, generalized weakness.  IV: 2 PIV, 1 infusing insulin gtt @ 7u/hr, Alg 4+  Labs/Electrolytes: BG q1  Resp: On 1L NC to keep sats above 94%.  Diet: Regular, good intake, carb counts/coverage in place.  GI: LBM 7/7, bowel meds given  : VDSP to commode.  Skin: Back incision sutured, ALEXI. Pressure dressing to old Hemovac site. Lidocaine patches x2.  Pain: Incisional pain, somewhat managed with PRN Oxy and Dilaudid.  Activity: A1-2, pivot, gb, FWW.  Social: Son Uzair visited today, helpful and supportive with cares.  Plan: Discharge to ARU Friday

## 2025-07-10 NOTE — CONSULTS
"Consult received for Vascular Access Team.  See LDA for details. For additional needs place \"Consult for Inpatient Vascular Access Care\"  [id number] order in EPIC.      "

## 2025-07-10 NOTE — PROGRESS NOTES
[facility], [place]   Neurosurgery Progress Note:    Date of service: 7/10/2025    Assessment: Teresa Sanderson is a 49 year old female s/p o-arm/stealth assisted Lumbar 3-5 fusion with pedicle screws on 7/7/2025 with Dr. Goodrich.    Clinically Significant Risk Factors Present on Admission          # Hypomagnesemia: Lowest Mg = 1.6 mg/dL in last 2 days, will replace as needed                 # DMII: A1C = 10.5 % (Ref range: <5.7 %) within past 6 months, PRESENT ON ADMISSION  # Obesity: Estimated body mass index is 30.98 kg/m  as calculated from the following:    Height as of this encounter: 1.829 m (6').    Weight as of this encounter: 103.6 kg (228 lb 6.3 oz)., PRESENT ON ADMISSION          Plan:  - Neuro checks/vital signs: Every 4 hours  - Pain control: PO robaxin, PO Oxycodone and atarax; d/c IV dilaudid today  - Activity: up as tolerated - encourage ambulation and sitting up in the chair  - Restrictions/Bracing: none  - Diet: Regular  - Drain: Hemovac drain, removed 7/9  - Anti-coagulation: HOLD Xarelto until POD 14  - DVT prophylaxis: SCDs & subcutaneous heparin   - Imaging:  UXR when able  - PT/OT: ordered  - Pain Team Consult - appreciate recommendations   - Insulin gtt  - Endocrinology Diabetes Consult - appreciate recommendations   - last BM 7/7 - MOM tonight if no BM during day      Interval History:  Patient reporting pain controlled. Has pain limited movements.  UXR when able.  PT/OT recommending TCU       Objective:   Temp:  [97.5  F (36.4  C)-98.6  F (37  C)] (P) 98.6  F (37  C)  Pulse:  [69-88] (P) 88  Resp:  [15-18] (P) 16  BP: (117-123)/(59-65) (P) 103/80  SpO2:  [96 %-100 %] 96 %  I/O last 3 completed shifts:  In: 960 [P.O.:960]  Out: 1575 [Urine:1575]    Gen: Appears comfortable, NAD  Wound: Incision, clean, dry, intact without strikethrough  Neurologic:  - Alert & Oriented to person, place, time, and situation  - Follows commands briskly  - Speech fluent, spontaneous.  No aphasia or dysarthria.  - No gaze preference. No apparent hemineglect.  - PERRL, EOMI  - Strong eye closure, jaw clench, and cheek puff  - Face symmetric with sensation intact to light touch  - Palate elevates symmetrically, uvula midline, tongue protrudes midline  - Trapezii and sternocleidomastoid muscles 5/5 bilaterally  - No pronator drift     Del Tr Bi WE WF Gr   R 5 5 5 5 5 5   L 5 5 5 5 5 5    HF KE KF DF PF EHL   R 5 5 5 5 5 5   L 5 5 5 5 5 5     Reflexes 2+ throughout    Sensation intact and symmetric to light touch throughout    LABS  Recent Labs   Lab Test 07/09/25  0622 07/08/25  0547 06/23/25  1250   WBC 6.3 7.7 3.5*   HGB 8.8* 9.3* 9.9*   MCV 88 84 89    225 165       Recent Labs   Lab Test 07/10/25  0803 07/10/25  0705 07/10/25  0601 07/09/25  0704 07/09/25  0622 07/08/25  0647 07/08/25  0547 07/07/25  0950 06/20/25  1215   NA  --   --   --   --  138  --  139  --  143   POTASSIUM  --   --   --   --  4.5  --  4.2  --  3.5   CHLORIDE  --   --   --   --  103  --  104  --  106   CO2  --   --   --   --  25  --  21*  --  24   BUN  --   --   --   --  13.1  --  8.7  --  8.6   CR  --   --   --   --  0.90  --  0.78  --  0.78   ANIONGAP  --   --   --   --  10  --  14  --  13   NANDO  --   --   --   --  9.9  --  10.0  --  9.2   * 131* 174*   < > 142*   < > 124*   < > 196*    < > = values in this interval not displayed.

## 2025-07-10 NOTE — PROGRESS NOTES
Inpatient Diabetes Management Service: Daily Progress Note     HPI: 49 year old female with multiple chronic comorbidites and recent L3-L5 spinal fusion, now with new onset severe hyperglycemia in the setting of poorly controlled or previously undiagnosed diabetes mellitus (A1C 10.5%).     During this hospitalization she has been on IV insulin since 7/7.  We have been overlapping it with subcutaneous prandial aspart     Total daily insulin  7/9/25 222 units  7/8/25 245 units    Diabetes relevant meds:   Iv insulin  Aspart 1 unit/4 grams with meals and snacks  Glargine 30 units/day    ROS:   Eating OK  Has been up to the bathroom  Pain with boosting in bed (which was just being done when I entered the room)   Hasn't given herself a shot of insulin yet          Assessment/Plan:     Assessment:   DM2 with hyperglycemia and high insulin requirement  Stress hyperglycemia  Steroid hyperglycemia due to dexamethasone given     Plan/Recommendations:   Increase prandial aspart from 1 unit/4 grams of carb to 1 unit/3 grams of carb  Increase glargine from 30 to 60 units /day   Diabetes education should be given on insulin administration and carb counting     Please notify Inpatient Diabetes Service if changes are planned to steroids, nutrition, TPN/TF and anticipated procedures requiring prolonged NPO status.         Interval History/Review of Systems :   Planned Procedures/Surgeries: none  Inpatient Glucose Control:   Recent Labs   Lab 07/10/25  0705 07/10/25  0601 07/10/25  0506 07/10/25  0408 07/10/25  0316 07/10/25  0201   * 174* 184* 183* 178* 171*           Medications Impacting Glycemia:   Steroids:  none  D5W-containing solutions/medications: none  Other medications impacting glucose: none        Nutrition:   Orders Placed This Encounter      Advance Diet as Tolerated: Regular Diet Adult          Diabetes History: see full consult note for complete diabetes history   Diabetes Type and  Duration: new diagnosis;   PTA Medication Regimen: none  Historical Diabetes Medications: none  Outpatient Diabetes Provider: none  Formal Diabetes Education/Educator: none        Physical Exam: approx 1725 PM    BP (P) 103/80 (BP Location: Left arm)   Pulse (P) 88   Temp (P) 98.6  F (37  C) (Oral)   Resp (P) 16   Ht 1.829 m (6')   Wt 103.6 kg (228 lb 6.3 oz)   SpO2 96%   BMI 30.98 kg/m    GENERAL: no distress; lying in bed   SKIN: Visible skin clear.   EYES: Eyes grossly normal to inspection.    NECK: no visible masses; no grossly visible goiter  RESP: No audible wheeze, cough, or increased work of breathing.    NEURO: Awake, alert, responds appropriately to questions.  Mentation and speech fluent.  PSYCH:affect normal,and appearance well-groomed.           Data:       Inpatient Diabetes Service will continue to follow, please don't hesitate to contact the team with any questions or concerns.     Veronica Spence MD      To contact Inpatient Diabetes Service:     7 AM - 5 PM: Page the IDS ALEKSANDRA following the patient that day (see filed or incomplete progress notes/consult notes under Endocrinology)    OR if uncertain of provider assignment: page job code [phone]    5 PM - 7 AM: First call after hours is to primary service.    For urgent after-hours questions, page job code for on call fellow: [phone]

## 2025-07-10 NOTE — CODE/RAPID RESPONSE
RT responded to rapid response called for difficult to awake. Upon arrival, pt was on 2L NC with sats at 97%.     No respiratory interventions were required.    Liz Galvez, RT on 7/10/2025 at 2:41 PM

## 2025-07-10 NOTE — PHARMACY-CONSULT NOTE
Pharmacy Delirium Chart Review    Upon chart review, the following medications may contribute to possible patient delirium:     -Famotidine (case reports)    Since famotidine is not a home medication, could consider holding it or discontinuing it.        Please consult unit pharmacist with further questions.    Juvenal Carbajal, PharmD, BCPS  July 10, 2025

## 2025-07-10 NOTE — CODE/RAPID RESPONSE
Rapid Response Team Note    Assessment   A rapid response was called on Teresa Sanderson due to acute encephalopathy. The presentation culprit is not entirely clear, but could be due to hypoxia or hypercapnia, electrolyte abnormality, acute anemia, infection, CVA, or arrhythmia. Less likely medication-induced as no sedating meds given this AM and remoteness of last opioid dose (last evening), makes this less likely. Not hypoglycemic.     Plan   -  STAT CXR, EKG, Trop, CMP, CBC, VBG, Ammonia  -  Agree w/ STAT head CT non-con  -  Hold all sedating meds for now  -  The Neurosurgery primary team was able to be reached and they are in agreement with the above plan.  -  Disposition: The patient will remain on the current unit. We will continue to monitor this patient closely.  -  Reassessment and plan follow-up will be performed by the primary team    Mars Colon PA-C  Rapid Response Team ALEKSANDRA  Securely message with [employer]     Medical Decision Making       30 MINUTES SPENT BY ME on the date of service doing chart review, history, exam, documentation & further activities per the note.      ADDENDUM 1:43 PM   - Mag low 1.3, so repleting w/ 4g IV Mag Sulfate now  - Added on RN-driven high-replacement protocol, further recheck of Mag per protocol  - EKG showing NSR, Trop <6  - Remainder of CMP, CBC, Ammonia non-contributory as to organic cause for clinical change  - Slightly hypercapnic on VBG, but appears VBG more c/w compensated metabolic acidosis (bicarb elevated). Would not expect this mild of hypercapnia to be driving her presentation. If no improvement or worsening, would get STAT recheck of VBG. If pCO2 worse would consider BiPAP therapy  - Does have a new L sided pleural effusion, unclear etiology and if r/t atelectasis vs edema. Primary Team consider CAPS consult for drainage. If obtaining, would also get fluid LDH & protein, as well as serum LDH & protein (make sure serum values obtained within 4 hrs of  thoracentesis if getting)    Hospital Course   Brief Summary of events leading to rapid response:   Rapid response called due to increased somnolence this afternoon.  Patient was fatigued this morning, but was able to get up from bed to the chair with assistance of therapies.  However, since being in the chair, is more difficult to arouse than earlier today.  On arrival to the bedside, patient arouses to loud voice but quickly falls back asleep.  Per RN, no new physical complaints aside from postoperative back pain.  No new medications this morning, and last dose of opioids was last evening.  Patient was uptitrated by RN prior to RRT from 1L of O2 via NC to 2L.  Satting WNL on the settings, and remainder of VSS.  Unable to obtain full ROS due to change in mentation.    Physical Exam   Vital Signs: Temp: 98.2  F (36.8  C) Temp src: Axillary BP: 100/60 Pulse: (P) 88   Resp: 14 SpO2: 96 % O2 Device: Nasal cannula Oxygen Delivery: 1 LPM  Weight: 228 lbs 6.34 oz    Exam:   Constitutional: somnolent, but rouses to loud voice, though quickly falls back asleep. no apparent distress, and appears stated age  Eyes: lids and lashes normal, sclera clear, and conjunctiva normal  ENT: normocephalic, without obvious abnormality  Respiratory: No increased work of breathing, good air exchange, clear to auscultation bilaterally, no crackles or wheezing  Cardiovascular: regular rate and rhythm, normal S1 and S2, no S3, no S4, and no murmur noted  GI: non-distended  Skin: no bruising or bleeding, no redness, warmth, or swelling, no rashes, and no lesions on visualized skin. Post-operative site visualized without erythema, drainage, or bleeding; staples appear C/D/I.  Musculoskeletal: no lower extremity pitting edema present, no deformities.  Neurologic: Neurologic exam limited d/t somnolence, but w/ bouts of pain is more awake and moves all extremities equally and spontaneously.    Significant Results and Procedures

## 2025-07-10 NOTE — PLAN OF CARE
Status: POD #3 L3-5 fusion with pedicle screws. Stay c/b by pain and insulin drip (started 7/7). Hx of metastatic breast CA, smoker, DVT, schizoaffective disorder, bipolar disorder, chronic pain.   Vitals: VSS on 1-2L NC, try to wean if able. On continuous pulse ox.   Neuros: Lethargic/somnolent all shift. Arousal W/W. Ox4. 4/5 t/out. PERRLA. GW. Baseline N/T to BLE.  IV: PIV infiltrated. Two PIV's placed this shift. One PIV SL. One PIV infusing insulin gtt @ 3U/hr on algorithm 4+.  Labs/Electrolytes: reviewed, mag to be replaced. VBG showing increased CO2.  Resp: on 1-2L NC. Lung sound clear.  Diet: regular diet with carb coverage. No PO intake today d/t lethargy/somnolence.  GI: LBM 7/7. BS hypoactive. Unable to given PRN MOM d/t mental status. If mental status does not clear, give supp.   : patient unable to void this shift. Bladder scanned for 6mL this shift @ 1430.   Skin: Back incision ALEXI with sutures. PIV infiltration site on R arm.  Pain: back pain with moving/repositioning, no meds administered d/t mental status.  Activity: A2/lift this shift. Repo q2hr.  SCDs on?: yes  Social: patient son updated on phone.  Plan: continue to monitor mental status. Head CT. Needing standing x-rays when able.  Updates this shift: Patient lethargic/somnolent all shift, RRT called d/t mental status lasting significantly longer than previous day and no pain meds administered. EKG, labs (with VBG), head CT, and Chest X-ray completed.delirium precuations placed. Team notified @ 0800 assessment and 1145 of patient mental status.

## 2025-07-10 NOTE — PLAN OF CARE
Status: POD #3 L3-5 fusion with pedicle screws. Stay c/b by pain and insulin drip (started 7/7). Hx of metastatic breast CA, smoker, DVT, schizoaffective disorder, bipolar disorder, chronic pain.   Vitals: VSS on 1L NC, weaning as able.   Neuros: A&Ox4, can be forgetful at times. 4-5/5 throughout with GW. Baseline N/T to BLE   IV: R. PIVx2 with one removed this shift due to leaking and other infusing insulin gtt via algorithm 4+  Labs/Electrolytes: BG q1 for insulin gtt.  Resp: Continuous pulse ox in place   Diet: Regular diet with carb counts/coverage   GI: LBM 7/7. Has scheduled senna and miralax   : VDSP to commode   Skin: Back incision ALEXI with sutures. Lidocaine patches to back incision   Pain: Back pain managed with scheduled tylenol and PRN oxycodone   Activity: A1-2/GB/Walker to pivot. PCDs in place   Plan: OT recommending TCU. PT recommending ARU. HOLD Xarelto until POD 14. Upright xray needed when able. Continue to monitor and follow POC     Goal Outcome Evaluation:      Plan of Care Reviewed With: patient    Overall Patient Progress: no change    Outcome Evaluation: Continues on insulin gtt. Upright xrays when able

## 2025-07-10 NOTE — PROGRESS NOTES
07/09/25 1500   Appointment Info   Signing Clinician's Name / Credentials (OT) Bertha Wong OT   Rehab Comments (OT) spinal, no brace needed per orders   Living Environment   People in Home child(pascual), adult   Current Living Arrangements house   Home Accessibility stairs to enter home;stairs within home   Number of Stairs, Main Entrance   (10 + landing, then 4 addtional stairs with hand rails)   Number of Stairs, Within Home, Primary greater than 10 stairs  (full flight of stairs to basement)   Stair Railings, Within Home, Primary railings safe and in good condition   Transportation Anticipated health plan transportation   Living Environment Comments Per pt, lives with daughter in one-level home, 10 + 4 [address], with basement  where laundry room is located but doesn't need to go down there. Has tub/shower with shower chair, no grab bars. Uses FWW for mobility when pain is bad.   Self-Care   Usual Activity Tolerance moderate   Current Activity Tolerance fair   Regular Exercise No   Equipment Currently Used at Home walker, standard;shower chair;other (see comments)  (reacher)   Fall history within last six months yes   Number of times patient has fallen within last six months 2   Activity/Exercise/Self-Care Comment Per pt, recieves 6 hours/day of PCA services from daughter who assists with occasional LB dressing and showers.   Instrumental Activities of Daily Living (IADL)   IADL Comments Per pt, daughter & son assists with IADLs   General Information   Onset of Illness/Injury or Date of Surgery 07/07/25   Referring Physician Modesta Goodrich MD   Additional Occupational Profile Info/Pertinent History of Current Problem 49 year old female s/p o-arm/stealth assisted Lumbar 3-5 fusion with pedicle screws on 7/7/2025 with Dr. Goodrich.   Existing Precautions/Restrictions fall;spinal   Left Upper Extremity (Weight-bearing Status) partial weight-bearing (PWB)  (no lifting >#10)   Right Upper Extremity (Weight-bearing  Status) partial weight-bearing (PWB)  (no lifting >#10)   Left Lower Extremity (Weight-bearing Status) full weight-bearing (FWB)   Right Lower Extremity (Weight-bearing Status) full weight-bearing (FWB)   Heart Disease Risk Factors Medical history   Cognitive Status Examination   Orientation Status orientation to person, place and time   Affect/Mental Status (Cognitive) anxious   Cognitive Status Comments tangential but redirectable   Posture   Posture forward head position;protracted shoulders   Strength Comprehensive (MMT)   Comment, General Manual Muscle Testing (MMT) Assessment not formally assessed at [facility] but likely overall generalized weakness & deconditioning   Transfers   Transfers bed-chair transfer;sit-stand transfer   Transfer Skill: Bed to Chair/Chair to Bed   Bed-Chair [place] (Transfers) minimum assist (75% patient effort)   Assistive Device (Bed-Chair Transfers) standard walker   Sit-Stand Transfer   Sit-Stand [place] (Transfers) minimum assist (75% patient effort)   Assistive Device (Sit-Stand Transfers) walker, front-wheeled   Activities of Daily Living   BADL Assessment/Intervention bathing;upper body dressing;lower body dressing;grooming;toileting   Bathing Assessment/Intervention   [place] Level (Bathing) maximum assist (25% patient effort)  (per clinical judgement)   Upper Body Dressing Assessment/Training   [place] Level (Upper Body Dressing) moderate assist (50% patient effort)  (per clinical judgement)   Lower Body Dressing Assessment/Training   [place] Level (Lower Body Dressing) maximum assist (25% patient effort)  (per clinical judgement)   Grooming Assessment/Training   [place] Level (Grooming) minimum assist (75% patient effort)  (per clinical judgement)   Toileting   [place] Level (Toileting) maximum assist (25% patient effort)  (per clinical judgement)   Clinical Impression   Criteria for Skilled Therapeutic Interventions Met (OT) Yes, treatment  indicated   OT Diagnosis decreased IND with ADLs & functional mobility   OT Problem List-Impairments impacting ADL problems related to;activity tolerance impaired;mobility;strength;pain;post-surgical precautions   Assessment of Occupational Performance 5 or more Performance Deficits   Identified Performance Deficits dressing, g/h, bed mobility, transfers, toileting, strength   Planned Therapy Interventions (OT) ADL retraining;bed mobility training;strengthening;transfer training   Clinical Decision Making Complexity (OT) problem focused assessment/low complexity   Risk & Benefits of therapy have been explained evaluation/treatment results reviewed   OT Total Evaluation Time   OT Eval, Low Complexity Minutes (28782) 5   OT Goals   Therapy Frequency (OT) 5 times/week   OT Predicted Duration/Target Date for Goal Attainment 07/31/25   OT Goals Hygiene/Grooming;Upper Body Dressing;Lower Body Dressing;Upper Body Bathing;Lower Body Bathing;Bed Mobility;Toilet Transfer/Toileting   OT: Hygiene/Grooming supervision/stand-by assist;within precautions;while standing   OT: Upper Body Dressing Supervision/stand-by assist;within precautions   OT: Lower Body Dressing Supervision/stand-by assist;within precautions   OT: Upper Body Bathing Supervision/stand-by assist;within precautions   OT: Lower Body Bathing Supervision/stand-by assist;with precautions   OT: Bed Mobility Supervision/stand-by assist;within precautions   OT: Toilet Transfer/Toileting Supervision/stand-by assist;within precautions   Interventions   Interventions Quick Adds Therapeutic Activity   Therapeutic Activities   Therapeutic Activity Minutes (62164) 30   Symptoms noted during/after treatment fatigue;increased pain   Treatment Detail/Skilled Intervention OT: Facilitated OOB mobility to promote IND & activity tolerance for ADLs. Pt supine in bed upon arrival, son present and supportive. Pt able to recall spinal precautions, educated pt on bed mobility within  spinal precautions. Pt anxious regarding the anticipation of pain, educated pt on the importance of mobility, pt verbally agreed. Rolling>R with CGA. Supine>sit Eli. Sitting balance SBA. STS with FWW Eli. Amb ~4ft with FWW, CGA, slow steps due to pain. Pt heavily relying on UE support through FWW, verbal and tactile cues for upright standing posture but pt not able to do so. Descend into recliner with CGA. Yelling  due to pain throughout bed mobility and transfer. Encouraged pt to move more to progress activity tolernace. All needs within reach at end of session, chair alarm on.   OT Discharge Planning   OT Plan OT: dressing with AE prn, g/h, toileting, bed mobility, tub tsf   OT Discharge Recommendation (DC Rec) Transitional Care Facility   OT Rationale for DC Rec Pt is below baseline with functional tasks & mobility. Recommend d/c to TCU to maximize rehab potential and decrease caregiver burden   OT Brief overview of current status bed mob Eli, STS with FWW Eli   OT Total Distance Amb During Session (feet) 4

## 2025-07-10 NOTE — PROVIDER NOTIFICATION
07/10/25 1200   Call Information   Date of Call 07/10/25   Time of Call 1221   Name of person requesting the team Naomi RAY   Title of person requesting team RN   RRT Arrival time 1224   Time RRT ended 1246   Reason for call   Type of RRT Adult   Primary reason for call Staff concerned  (lethargy)   Was patient transferred from the ED, ICU, or PACU within last 24 hours prior to RRT call? No   SBAR   Situation pt lethargic. arouses to stimulation only for a few seconds. does not answer any questions. RASS -2. VSS. on 1-2L NC. having pain in back at fusion site.   Background per provider note: multiple chronic comorbidites and recent L3-L5 spinal fusion, now with new onset severe hyperglycemia in the setting of poorly controlled or previously undiagnosed diabetes mellitus (A1C 10.5%).      During this hospitalization she has been on IV insulin since 7/7.  We have been overlapping it with subcutaneous prandial aspart.   Notable History/Conditions Recent surgery;Diabetes   Assessment RASS -2. pt very lethargic. no narcs given. arouses only to stimulation. does not answer questions. just complaints of back pain (at fusion site). VSS. afebrile.   Interventions ECG;Labs;Other (describe)  (ammonia, CBC, CMP, VBG. chest xray, 12-lead, head CT ordered. remain on 1-2L NC.)   Patient Outcome   Patient Outcome Stabilized on unit   RRT Team   Attending/Primary/Covering Physician Neuro Surg   Date Attending Physician notified 07/10/25   Time Attending Physician notified 1221   Physician(s) Mars LOCK   Lead RICHARD Galvez   Other staff Heber YEBOAH   Post RRT Intervention Assessment   Post RRT Assessment Stable/Improved   Date Follow Up Done 07/10/25   Time Follow Up Done 1708   Comments Pt VSS.RASS 0, A/O x4 following commands. CT head negative

## 2025-07-10 NOTE — PROGRESS NOTES
Pain Service Progress Note  [facility]  Date: 07/10/2025       Patient Name: Teresa Sanderson  MRN: [id number]  Age: 49 year old  Sex: female      Assessment:  Teresa Sanderson is a 49 year old female who has PMH of former smoker, anemia, history of DVT/chronic DVT, migraine, obesity, schizoaffective disorder, bipolar disorder, chronic pain on chronic opioids and metastatic breast cancer with pathologic fracture. She has had previous radiofrequency ablation kyphoplasty in 2021 as well as radiation therapy.   She is  s/p Lumbar 3-5 fusion on 7/7/25 with neurosurgery and with diffficult to manage pain control.      PTA was on Belbuca 600mcg film Q 12 hours and oxycodone 10mg 5-6x/day (although not quite sure what she takes at home) by outpatient palliative service.    Teresa is seen sitting in the recliner. She is very sleepy and sleeping during the visit.  She was able to be aroused very briefly with voice and touch to arm.  Able to follow command and open eyes and then fall back to sleep.  RN notified.  Recommend decreasing gabapentin dosage.    Plan/Recommendations:    Acetaminophen 975mg PO 8 hours  Robaxin 500mg PO Q 6 hours PRN  Continue PTA dose of Belbuca 600mcg film Q 12 hours  Oxycodone 5-10mg PO Q 3 hours PRN                      IV Dilaudid 0.2-0.4mg IV Q 2 hours PRN  Change Gabapentin 100mg, 100mg and 300mg at bedtime  Lidocaine patches 1-3 patches Q 24 hours, 12 hours on and 12 hours off  Menthol patches, 1 patch TD Q 8 hours  Bowel regimen      Pain Service will continue to follow.    Discussed with attending anesthesiologist- the context of our conversation was sleepiness during the visit-advise to decrease gabapentin.     Chayo Adorno PA-C  07/10/2025         Diet: Advance Diet as Tolerated: Regular Diet Adult    Relevant Labs:  Recent Labs   Lab Test 07/10/25  1257 03/04/25  1111 02/18/25  1008 07/09/24  1419 07/01/24  1330   INR  --   --  0.96  --  1.11       "< >  --    < > 167   PTT  --   --   --   --  25   BUN 11.6   < >  --    < > 12.2    < > = values in this interval not displayed.       Physical Exam:  Vitals: /67   Pulse (P) 88   Temp 98.2  F (36.8  C) (Axillary)   Resp 14   Ht 1.829 m (6')   Wt 103.6 kg (228 lb 6.3 oz)   SpO2 97%   BMI 30.98 kg/m      Physical Exam:   CONSTITUTIONAL/GENERAL APPEARANCE: sleepy  EYES: EOMI, sclerae clear    RESPIRATORY:on nasal cannula  CARDIOVASCULAR: HR within normal limits  MUSCULOSKELETAL/BACK/SPINE/EXTREMITIES: Moving UE and LE independently.     NEURO:  AAOx3.   SKIN/VASCULAR EXAM:  Dry and warm.  PSYCHIATRIC/BEHAVIORAL/OBSERVATIONS:  No objective signs of pain observed during our interview.   Judgment/Insight -fair   Orientation - x3   Memory -fair   Mood and affect - calm, pleasant, cooperative       Relevant Medications:  Current Pain Medications:  Medications related to Pain Management (From now, onward)      Start     Dose/Rate Route Frequency Ordered Stop    07/10/25 2200  gabapentin (NEURONTIN) capsule 300 mg        Note to Pharmacy: PTA Sig:Take 1 capsule (300 mg) by mouth 3 times daily.      300 mg Oral AT BEDTIME 07/10/25 1109      07/10/25 1600  gabapentin (NEURONTIN) capsule 100 mg         100 mg Oral 2 TIMES DAILY 07/10/25 1109      07/10/25 1159  oxyCODONE (ROXICODONE) tablet 5 mg        Placed in \"Or\" Linked Group    5 mg Oral EVERY 4 HOURS PRN 07/10/25 1159      07/10/25 0000  bisacodyl (DULCOLAX) suppository 10 mg         10 mg Rectal DAILY PRN 07/07/25 2015 07/09/25 0000  magnesium hydroxide (MILK OF MAGNESIA) suspension 30 mL         30 mL Oral DAILY PRN 07/07/25 2015 07/08/25 0830  Buprenorphine HCl (BELBUCA) buccal film 600 mcg        Note to Pharmacy: PTA Sig:Place 1 Film (300 mcg) inside cheek every 12 hours.      600 mcg Buccal EVERY 12 HOURS 07/07/25 2131 07/08/25 0813  hydrOXYzine HCl (ATARAX) tablet 50 mg        Note to Pharmacy: PTA Sig:Take 1 tablet (25 mg) by mouth 4 " "times daily as needed for anxiety or other (panic).  Patient taking differently: Take 25 mg by mouth every morning.      50 mg Oral 4 TIMES DAILY PRN 07/08/25 0813 07/07/25 2030  acetaminophen (TYLENOL) tablet 975 mg         975 mg Oral EVERY 8 HOURS 07/07/25 2015 07/07/25 2030  Lidocaine (LIDOCARE) 4 % Patch 2 patch         2 patch  over 12 Hours Transdermal EVERY 24 HOURS 2000 07/07/25 2015 07/07/25 2015  lidocaine 1 % 0.1-1 mL         0.1-1 mL Other EVERY 1 HOUR PRN 07/07/25 2015 07/07/25 2015  lidocaine (LMX4) cream          Topical EVERY 1 HOUR PRN 07/07/25 2015              Primary Service Contacted with Recommendations? Yes            Acute Inpatient Pain Service [facility]  Hours of pain coverage 24/7   Please Page via [employer]  - Link to [employer] Here - Search Pain  Page via [employer]- Please Page the Pain Team Via [employer]: \"PAIN MANAGEMENT ACUTE INPATIENT/ [facility]\"            "

## 2025-07-11 ENCOUNTER — APPOINTMENT (OUTPATIENT)
Dept: OCCUPATIONAL THERAPY | Facility: CLINIC | Age: 50
DRG: 543 | End: 2025-07-11
Attending: NEUROLOGICAL SURGERY
Payer: MEDICARE

## 2025-07-11 ENCOUNTER — APPOINTMENT (OUTPATIENT)
Dept: PHYSICAL THERAPY | Facility: CLINIC | Age: 50
DRG: 543 | End: 2025-07-11
Attending: NEUROLOGICAL SURGERY
Payer: MEDICARE

## 2025-07-11 LAB
ALBUMIN UR-MCNC: NEGATIVE MG/DL
ANION GAP SERPL CALCULATED.3IONS-SCNC: 10 MMOL/L (ref 7–15)
APPEARANCE UR: CLEAR
BILIRUB UR QL STRIP: NEGATIVE
BUN SERPL-MCNC: 9 MG/DL (ref 6–20)
CALCIUM SERPL-MCNC: 9.4 MG/DL (ref 8.8–10.4)
CHLORIDE SERPL-SCNC: 100 MMOL/L (ref 98–107)
COLOR UR AUTO: ABNORMAL
CREAT SERPL-MCNC: 0.65 MG/DL (ref 0.51–0.95)
EGFRCR SERPLBLD CKD-EPI 2021: >90 ML/MIN/1.73M2
ERYTHROCYTE [DISTWIDTH] IN BLOOD BY AUTOMATED COUNT: 14.7 % (ref 10–15)
GLUCOSE BLDC GLUCOMTR-MCNC: 134 MG/DL (ref 70–99)
GLUCOSE BLDC GLUCOMTR-MCNC: 159 MG/DL (ref 70–99)
GLUCOSE BLDC GLUCOMTR-MCNC: 163 MG/DL (ref 70–99)
GLUCOSE BLDC GLUCOMTR-MCNC: 212 MG/DL (ref 70–99)
GLUCOSE BLDC GLUCOMTR-MCNC: 215 MG/DL (ref 70–99)
GLUCOSE SERPL-MCNC: 175 MG/DL (ref 70–99)
GLUCOSE UR STRIP-MCNC: 150 MG/DL
HCO3 SERPL-SCNC: 27 MMOL/L (ref 22–29)
HCT VFR BLD AUTO: 27.2 % (ref 35–47)
HGB BLD-MCNC: 8.4 G/DL (ref 11.7–15.7)
HGB UR QL STRIP: NEGATIVE
KETONES UR STRIP-MCNC: NEGATIVE MG/DL
LEUKOCYTE ESTERASE UR QL STRIP: NEGATIVE
MAGNESIUM SERPL-MCNC: 1.7 MG/DL (ref 1.7–2.3)
MCH RBC QN AUTO: 26.6 PG (ref 26.5–33)
MCHC RBC AUTO-ENTMCNC: 30.9 G/DL (ref 31.5–36.5)
MCV RBC AUTO: 86 FL (ref 78–100)
MUCOUS THREADS #/AREA URNS LPF: PRESENT /LPF
NITRATE UR QL: NEGATIVE
PH UR STRIP: 6 [PH] (ref 5–7)
PHOSPHATE SERPL-MCNC: 4 MG/DL (ref 2.5–4.5)
PLATELET # BLD AUTO: 179 10E3/UL (ref 150–450)
POTASSIUM SERPL-SCNC: 4.1 MMOL/L (ref 3.4–5.3)
RBC # BLD AUTO: 3.16 10E6/UL (ref 3.8–5.2)
RBC URINE: 0 /HPF
SODIUM SERPL-SCNC: 137 MMOL/L (ref 135–145)
SP GR UR STRIP: 1.01 (ref 1–1.03)
SQUAMOUS EPITHELIAL: 2 /HPF
UROBILINOGEN UR STRIP-MCNC: NORMAL MG/DL
WBC # BLD AUTO: 5.2 10E3/UL (ref 4–11)
WBC URINE: <1 /HPF

## 2025-07-11 PROCEDURE — 83735 ASSAY OF MAGNESIUM: CPT

## 2025-07-11 PROCEDURE — 97535 SELF CARE MNGMENT TRAINING: CPT | Mod: GO

## 2025-07-11 PROCEDURE — 250N000012 HC RX MED GY IP 250 OP 636 PS 637

## 2025-07-11 PROCEDURE — 99232 SBSQ HOSP IP/OBS MODERATE 35: CPT | Mod: GC | Performed by: STUDENT IN AN ORGANIZED HEALTH CARE EDUCATION/TRAINING PROGRAM

## 2025-07-11 PROCEDURE — 120N000002 HC R&B MED SURG/OB UMMC

## 2025-07-11 PROCEDURE — 250N000011 HC RX IP 250 OP 636: Performed by: NURSE PRACTITIONER

## 2025-07-11 PROCEDURE — 36415 COLL VENOUS BLD VENIPUNCTURE: CPT

## 2025-07-11 PROCEDURE — 250N000013 HC RX MED GY IP 250 OP 250 PS 637: Performed by: NURSE PRACTITIONER

## 2025-07-11 PROCEDURE — 81001 URINALYSIS AUTO W/SCOPE: CPT

## 2025-07-11 PROCEDURE — 82310 ASSAY OF CALCIUM: CPT

## 2025-07-11 PROCEDURE — 250N000013 HC RX MED GY IP 250 OP 250 PS 637: Performed by: NEUROLOGICAL SURGERY

## 2025-07-11 PROCEDURE — 84100 ASSAY OF PHOSPHORUS: CPT

## 2025-07-11 PROCEDURE — 97530 THERAPEUTIC ACTIVITIES: CPT | Mod: GP | Performed by: REHABILITATION PRACTITIONER

## 2025-07-11 PROCEDURE — 250N000013 HC RX MED GY IP 250 OP 250 PS 637

## 2025-07-11 PROCEDURE — 99232 SBSQ HOSP IP/OBS MODERATE 35: CPT | Performed by: PHYSICIAN ASSISTANT

## 2025-07-11 PROCEDURE — 85014 HEMATOCRIT: CPT

## 2025-07-11 RX ORDER — MAGNESIUM OXIDE 400 MG/1
400 TABLET ORAL EVERY 4 HOURS
Status: COMPLETED | OUTPATIENT
Start: 2025-07-11 | End: 2025-07-11

## 2025-07-11 RX ORDER — METHOCARBAMOL 500 MG/1
250 TABLET ORAL 4 TIMES DAILY PRN
Status: DISCONTINUED | OUTPATIENT
Start: 2025-07-11 | End: 2025-07-15 | Stop reason: HOSPADM

## 2025-07-11 RX ADMIN — MAGNESIUM HYDROXIDE 30 ML: 400 SUSPENSION ORAL at 07:23

## 2025-07-11 RX ADMIN — GABAPENTIN 100 MG: 100 CAPSULE ORAL at 20:52

## 2025-07-11 RX ADMIN — OXYCODONE HYDROCHLORIDE 5 MG: 5 TABLET ORAL at 16:06

## 2025-07-11 RX ADMIN — OXYCODONE HYDROCHLORIDE 5 MG: 5 TABLET ORAL at 08:10

## 2025-07-11 RX ADMIN — FAMOTIDINE 20 MG: 20 TABLET, FILM COATED ORAL at 07:21

## 2025-07-11 RX ADMIN — Medication 25 MCG: at 07:20

## 2025-07-11 RX ADMIN — HEPARIN SODIUM 5000 UNITS: 5000 INJECTION, SOLUTION INTRAVENOUS; SUBCUTANEOUS at 02:11

## 2025-07-11 RX ADMIN — ACETAMINOPHEN 975 MG: 325 TABLET ORAL at 20:52

## 2025-07-11 RX ADMIN — MAGNESIUM OXIDE TAB 400 MG (241.3 MG ELEMENTAL MG) 400 MG: 400 (241.3 MG) TAB at 16:06

## 2025-07-11 RX ADMIN — BUPRENORPHINE HYDROCHLORIDE 600 MCG: 600 FILM, SOLUBLE BUCCAL at 07:25

## 2025-07-11 RX ADMIN — GLYCERIN 1 SUPPOSITORY: 2 SUPPOSITORY RECTAL at 13:33

## 2025-07-11 RX ADMIN — INSULIN GLARGINE 60 UNITS: 100 INJECTION, SOLUTION SUBCUTANEOUS at 09:25

## 2025-07-11 RX ADMIN — ACETAMINOPHEN 975 MG: 325 TABLET ORAL at 11:57

## 2025-07-11 RX ADMIN — OXYCODONE HYDROCHLORIDE 5 MG: 5 TABLET ORAL at 11:57

## 2025-07-11 RX ADMIN — HEPARIN SODIUM 5000 UNITS: 5000 INJECTION, SOLUTION INTRAVENOUS; SUBCUTANEOUS at 18:39

## 2025-07-11 RX ADMIN — OXYCODONE HYDROCHLORIDE 5 MG: 5 TABLET ORAL at 22:10

## 2025-07-11 RX ADMIN — GABAPENTIN 300 MG: 300 CAPSULE ORAL at 22:10

## 2025-07-11 RX ADMIN — GABAPENTIN 100 MG: 100 CAPSULE ORAL at 07:20

## 2025-07-11 RX ADMIN — QUETIAPINE FUMARATE 400 MG: 400 TABLET ORAL at 23:08

## 2025-07-11 RX ADMIN — BUPRENORPHINE HYDROCHLORIDE 600 MCG: 600 FILM, SOLUBLE BUCCAL at 20:53

## 2025-07-11 RX ADMIN — HYDROXYZINE HYDROCHLORIDE 50 MG: 50 TABLET ORAL at 17:28

## 2025-07-11 RX ADMIN — ACETAMINOPHEN 975 MG: 325 TABLET ORAL at 04:09

## 2025-07-11 RX ADMIN — LIDOCAINE 2 PATCH: 4 PATCH TOPICAL at 20:58

## 2025-07-11 RX ADMIN — HEPARIN SODIUM 5000 UNITS: 5000 INJECTION, SOLUTION INTRAVENOUS; SUBCUTANEOUS at 09:25

## 2025-07-11 RX ADMIN — SERTRALINE HYDROCHLORIDE 100 MG: 100 TABLET ORAL at 07:21

## 2025-07-11 RX ADMIN — FAMOTIDINE 20 MG: 20 TABLET, FILM COATED ORAL at 20:53

## 2025-07-11 RX ADMIN — OXYCODONE HYDROCHLORIDE 5 MG: 5 TABLET ORAL at 04:09

## 2025-07-11 RX ADMIN — MAGNESIUM OXIDE TAB 400 MG (241.3 MG ELEMENTAL MG) 400 MG: 400 (241.3 MG) TAB at 11:57

## 2025-07-11 ASSESSMENT — ACTIVITIES OF DAILY LIVING (ADL)
ADLS_ACUITY_SCORE: 43
ADLS_ACUITY_SCORE: 42
ADLS_ACUITY_SCORE: 38
ADLS_ACUITY_SCORE: 42
ADLS_ACUITY_SCORE: 44
ADLS_ACUITY_SCORE: 42
ADLS_ACUITY_SCORE: 38
ADLS_ACUITY_SCORE: 44
ADLS_ACUITY_SCORE: 38
ADLS_ACUITY_SCORE: 42
ADLS_ACUITY_SCORE: 38
ADLS_ACUITY_SCORE: 43
ADLS_ACUITY_SCORE: 42
ADLS_ACUITY_SCORE: 44
ADLS_ACUITY_SCORE: 42
ADLS_ACUITY_SCORE: 38

## 2025-07-11 NOTE — PLAN OF CARE
Goal Outcome Evaluation:      Plan of Care Reviewed With: patient    Overall Patient Progress: improvingOverall Patient Progress: improving    Outcome Evaluation: Patient is agreeable to recommended TCU stay.      DANNY Ledbetter  Social Work, 6A  Phone:  [phone]  Pager:  [phone]  7/11/2025

## 2025-07-11 NOTE — PLAN OF CARE
Goal Outcome Evaluation:      Plan of Care Reviewed With: patient    Overall Patient Progress: no changeOverall Patient Progress: no change    Outcome Evaluation: still needing BM. much more alert today. increased carb coverage ratio.      Status: POD #4 L3-5 fusion with pedicle screws. Stay c/b by pain and insulin drip. Hx of metastatic breast CA, smoker, DVT, schizoaffective disorder, bipolar disorder, chronic pain.   Vitals: VSS on RA. On continuous pulse ox.   Neuros: Alert to lethargic this AM. Ox3-4, disoriented intermittently to time. 4/5 t/out. GW. Baseline N/T to BLE and intermittently to BUE fingertips.  IV: L PIV SL x2  Labs/Electrolytes: BG checks ACHS. UA negative. Magnesium replaced one more dose this afternoon, redraws ordered for tmw AM.  Resp: on RA. Diminished lower lung sounds.   Diet: regular diet with carb coverage (ratio increased this shift).  GI: BS+, one large BM this shift after suppository. PRN MOM and supp given today.  : Voiding with urgency.   Skin: Back incision ALEXI with sutures. PIV infiltration site on R arm, with trace edema.  Pain: back pain partially managed with scheduled meds and PRN oxycodone x2.  Activity: A1-2/gb/walker. Up in chair most of day.  SCDs on?: yes, when in bed.  Social: patient son updated on phone.  Plan: continue to monitor and follow POC. Have BM. Pain management. Encourage movement and ambulation. Discharge to TCU.  Updates this shift: UA sent and negative. Increased carb count ratio. Much more alert this AM, little to no lethargy noted.  Education: Diabetes Education needed.

## 2025-07-11 NOTE — PLAN OF CARE
Status: POD #3 L3-5 fusion with pedicle screws. Stay c/b by pain and insulin drip (started 7/7). Hx of metastatic breast CA, smoker, DVT, schizoaffective disorder, bipolar disorder, chronic pain.   Vitals: VSS on RA  Neuros: A&Ox4 GW forgetful Baseline N/T BLE  IV: PIV SL  Labs/Electrolytes: CT done this shift and standing x-ray ACHS BG Checks, Needs UA  Resp/trach: WNL  Diet:  Regular diet   Bowel status: LBM 7/7 PRN MOM available   : Voiding with urgency   Skin: Back incision ALEXI  Pain: PRN oxy 5mg   Activity: A1 GB walker, pivot to commode  Social: Son staying overnight   Plan: Continue to monitor and follow POC
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
## 2025-07-11 NOTE — PROGRESS NOTES
Inpatient Diabetes Management Service: Daily Progress Note     HPI:   49 year old female with multiple chronic comorbidites and recent L3-L5 spinal fusion, now with new onset severe hyperglycemia in the setting of poorly controlled or previously undiagnosed diabetes mellitus (A1C 10.5%).      During this hospitalization she has been on IV insulin since 7/7.  We have been overlapping it with subcutaneous prandial aspart. IV insulin discontinued on 07/10       Diabetes relevant meds  Aspart 1 unit/3 grams with meals and snacks  Correctional scale  Glargine 30 units/day         Assessment/Plan:     Assessment:   DM2 with hyperglycemia and high insulin requirement  Stress hyperglycemia  Steroid hyperglycemia due to dexamethasone given      Plan/Recommendations:   -- Increase prandial aspart from 1 unit/4 grams of carb to 1 unit/3 grams of carb  -- Lantus 60 units /day   -- Diabetes education should be given on insulin administration and carb counting   -- Patient is not aware of how to give herself insulin -- spoke to diabetes educator on [employer] for consult. Recommended patient to ask nurses when they are giving her insulin to ask them how to give it -- She NEEDS this education  - Hypoglycemia protocol  - Carb counting protocol       Please notify Inpatient Diabetes Service if changes are planned to steroids, nutrition, TPN/TF and anticipated procedures requiring prolonged NPO status.         Interval History/Review of Systems :   The last 24 hours progress and nursing notes reviewed.      Planned Procedures/Surgeries: L3-L5 fusion on 07/07/2025        Recent Labs   Lab 07/11/25  1616 07/11/25  1133 07/11/25  0737 07/11/25  0647 07/11/25  0203 07/10/25  2238   * 215* 159* 175* 212* 252*             Medications Impacting Glycemia:   Steroids:  On the day of surgery   D5W-containing solutions/medications: none   Other medications impacting glucose: Olanzapine         Nutrition:   Orders  Placed This Encounter      Advance Diet as Tolerated: Regular Diet Adult    Supplements: None   TF: None  TPN: None         Diabetes History: see full consult note for complete diabetes history   Diabetes Type and Duration: new diagnosis;   PTA Medication Regimen: none  Historical Diabetes Medications: none  Outpatient Diabetes Provider: none  Formal Diabetes Education/Educator: none        Physical Exam:   /66 (BP Location: Right arm)   Pulse 86   Temp 97.9  F (36.6  C) (Oral)   Resp 17   Ht 1.829 m (6')   Wt 103.6 kg (228 lb 6.3 oz)   SpO2 100%   BMI 30.98 kg/m    GENERAL: no distress; lying in bed   SKIN: Visible skin clear.   EYES: Eyes grossly normal to inspection.    NECK: no visible masses; no grossly visible goiter  RESP: No audible wheeze, cough, or increased work of breathing.    NEURO: Awake, alert, responds appropriately to questions.  Mentation and speech fluent.  PSYCH:affect normal,and appearance well-groomed.           Data:     Lab Results   Component Value Date    A1C 10.5 (H) 07/07/2025    A1C 8.6 (H) 06/19/2025    A1C 6.1 (H) 03/04/2025    A1C 6.0 (H) 09/11/2024    A1C 6.4 (H) 01/05/2023       ROUTINE IP LABS (Last four results)  BMP  Recent Labs   Lab 07/11/25  1616 07/11/25  1133 07/11/25  0737 07/11/25  0647 07/10/25  1317 07/10/25  1257 07/10/25  1140 07/10/25  1029 07/09/25  0704 07/09/25  0622   NA  --   --   --  137  --  137  --  139  --  138   POTASSIUM  --   --   --  4.1  --  4.2  --  4.2  --  4.5   CHLORIDE  --   --   --  100  --  101  --  104  --  103   NANDO  --   --   --  9.4  --  9.7  --  9.7  --  9.9   CO2  --   --   --  27  --  26  --  26  --  25   BUN  --   --   --  9.0  --  11.6  --  11.8  --  13.1   CR  --   --   --  0.65  --  0.63  --  0.69  --  0.90   * 215* 159* 175*   < > 107*   < > 106*   < > 142*    < > = values in this interval not displayed.     CBC  Recent Labs   Lab 07/11/25  0647 07/10/25  1257 07/10/25  1029 07/09/25  0622   WBC 5.2 5.9 5.3 6.3    RBC 3.16* 3.28* 3.13* 3.25*   HGB 8.4* 8.9* 8.5* 8.8*   HCT 27.2* 29.2* 27.6* 28.7*   MCV 86 89 88 88   MCH 26.6 27.1 27.2 27.1   MCHC 30.9* 30.5* 30.8* 30.7*   RDW 14.7 14.6 14.6 14.6    182 161 179     INRNo lab results found in last 7 days.    Inpatient Diabetes Service will continue to follow, please don't hesitate to contact the team with any questions or concerns.     DEMETRIUS Murdock        To contact Inpatient Diabetes Service:     7 AM - 5 PM: Page the IDS ALEKSANDRA following the patient that day (see filed or incomplete progress notes/consult notes under Endocrinology)    OR if uncertain of provider assignment: page job code [phone]    5 PM - 7 AM: First call after hours is to primary service.    For urgent after-hours questions, page job code for on call fellow: [phone]

## 2025-07-11 NOTE — PROGRESS NOTES
"Pain Service Progress Note  [facility]  Date: 07/11/2025       Patient Name: Teresa Sanderson  MRN: [id number]  Age: 49 year old  Sex: female      Assessment:    Teresa Sanderson is a 49 year old female who has PMH of former smoker, anemia, history of DVT/chronic DVT, migraine, obesity, schizoaffective disorder, bipolar disorder, chronic pain on chronic opioids and metastatic breast cancer with pathologic fracture. She has had previous radiofrequency ablation kyphoplasty in 2021 as well as radiation therapy.   She is  s/p Lumbar 3-5 fusion on 7/7/25 with neurosurgery and with diffficult to manage pain control.      PTA was on Belbuca 600mcg film Q 12 hours and oxycodone 10mg 5-6x/day (although she's not quite sure what she takes at home) by outpatient palliative service.    Teresa is seen with son at the bedside.  Teresa is AAOX3, conversant, sitting up, and eating breakfast.  She reports feeling better today. States pain is in the lower back.  Rates pain level 15/10 which has improved as previous days were \" 25/10, 17/10.\"   Encourage to use lower dose of pain medications as able to minimize side effects.        Plan/Recommendations:  Acetaminophen 975mg PO 8 hours  Robaxin 500mg PO Q 6 hours PRN  Continue PTA dose of Belbuca 600mcg film Q 12 hours  Oxycodone 5-10mg PO Q 3 hours PRN. Use 5mg.                       IV Dilaudid 0.2 mg IV Q 2 hours PRN  Change Gabapentin 100mg, 100mg and 300mg at bedtime  Lidocaine patches 1-3 patches Q 24 hours, 12 hours on and 12 hours off  Menthol patches, 1 patch TD Q 8 hours  Bowel regimen     Pain Service will sign off.    Discussed with attending anesthesiologist- the context of our conversation was improved pain and alertness. Medication regimen as above.    Chayo Adorno PA-C  07/11/2025       Diet: Advance Diet as Tolerated: Regular Diet Adult    Relevant Labs:  Recent Labs   Lab Test 07/11/25  0647 03/04/25  1111 02/18/25  1008 " "07/09/24  1419 07/01/24  1330   INR  --   --  0.96  --  1.11      < >  --    < > 167   PTT  --   --   --   --  25   BUN 9.0   < >  --    < > 12.2    < > = values in this interval not displayed.       Physical Exam:  Vitals: /81 (BP Location: Right arm)   Pulse 71   Temp 97.7  F (36.5  C) (Oral)   Resp 16   Ht 1.829 m (6')   Wt 103.6 kg (228 lb 6.3 oz)   SpO2 96%   BMI 30.98 kg/m      Physical Exam:   CONSTITUTIONAL/GENERAL APPEARANCE: Conversant.  EYES: EOMI, sclerae clear  ENT/NECK: neck is supple  RESPIRATORY:non labored breathing, on room air  CARDIOVASCULAR: HR within normal limits  GI:soft, nontender,   MUSCULOSKELETAL/BACK/SPINE/EXTREMITIES: Moving UE and LE independently.     NEURO:  AAOx3.   SKIN/VASCULAR EXAM:  Dry and warm.  PSYCHIATRIC/BEHAVIORAL/OBSERVATIONS:  No objective signs of pain observed during our interview.   Judgment/Insight -fair   Orientation - x3   Memory -fair   Mood and affect - calm, pleasant, cooperative         Relevant Medications:  Current Pain Medications:  Medications related to Pain Management (From now, onward)      Start     Dose/Rate Route Frequency Ordered Stop    07/11/25 1200  Enema Compound (docusate/mineral oil/NaPhos) NO MAG CIT PREMIX         226 mL Rectal ONCE 07/11/25 0829      07/11/25 0830  glycerin (ADULT) Suppository 1 suppository         1 suppository Rectal ONCE 07/11/25 0826      07/10/25 2200  gabapentin (NEURONTIN) capsule 300 mg        Note to Pharmacy: PTA Sig:Take 1 capsule (300 mg) by mouth 3 times daily.      300 mg Oral AT BEDTIME 07/10/25 1109      07/10/25 1600  gabapentin (NEURONTIN) capsule 100 mg         100 mg Oral 2 TIMES DAILY 07/10/25 1109      07/10/25 1159  oxyCODONE (ROXICODONE) tablet 5 mg        Placed in \"Or\" Linked Group    5 mg Oral EVERY 4 HOURS PRN 07/10/25 1159      07/10/25 0000  bisacodyl (DULCOLAX) suppository 10 mg         10 mg Rectal DAILY PRN 07/07/25 2015 07/09/25 0000  magnesium hydroxide (MILK OF " "MAGNESIA) suspension 30 mL         30 mL Oral DAILY PRN 07/07/25 2015 07/08/25 0830  Buprenorphine HCl (BELBUCA) buccal film 600 mcg        Note to Pharmacy: PTA Sig:Place 1 Film (300 mcg) inside cheek every 12 hours.      600 mcg Buccal EVERY 12 HOURS 07/07/25 2131 07/08/25 0813  hydrOXYzine HCl (ATARAX) tablet 50 mg        Note to Pharmacy: PTA Sig:Take 1 tablet (25 mg) by mouth 4 times daily as needed for anxiety or other (panic).  Patient taking differently: Take 25 mg by mouth every morning.      50 mg Oral 4 TIMES DAILY PRN 07/08/25 0813 07/07/25 2030  acetaminophen (TYLENOL) tablet 975 mg         975 mg Oral EVERY 8 HOURS 07/07/25 2015 07/07/25 2030  Lidocaine (LIDOCARE) 4 % Patch 2 patch         2 patch  over 12 Hours Transdermal EVERY 24 HOURS 2000 07/07/25 2015 07/07/25 2015  lidocaine 1 % 0.1-1 mL         0.1-1 mL Other EVERY 1 HOUR PRN 07/07/25 2015 07/07/25 2015  lidocaine (LMX4) cream          Topical EVERY 1 HOUR PRN 07/07/25 2015              Primary Service Contacted with Recommendations? Yes            Acute Inpatient Pain Service [facility]  Hours of pain coverage 24/7   Please Page via [employer]  - Link to [employer] Here - Search Pain  Page via [employer]- Please Page the Pain Team Via [employer]: \"PAIN MANAGEMENT ACUTE INPATIENT/ [facility]\"            "

## 2025-07-11 NOTE — PLAN OF CARE
VSS on RA.  Reports incisional back pain 10/10 most times but then will fall asleep mid conversation.  Pt continues to be lethargic, arouses to voice, intermittent requiring soft touch.  Ox4, 4/5 strength t/o, and n/t BLE.  Back incision intact with sutures, no drainage, ALEXI.  Primapore covering old Hemovac site.  L PIV x 2 SL.  On a regular diet; denied nausea.  Voiding spontaneously.  Still needs UA; staff forgot with last void.  BM 7/7 PTA.  Up with 1-2, GB ,walker to C.  Diabetes education needed.  PT/OT recommending TCU at discharge.  Continue with POC.        Goal Outcome Evaluation:      Plan of Care Reviewed With: patient    Overall Patient Progress: no change    Outcome Evaluation: Ongoing lethargy

## 2025-07-11 NOTE — PROGRESS NOTES
[facility], [place]   Neurosurgery Progress Note:    Date of service: 7/11/2025    Assessment: Teresa Sanderson is a 49 year old female s/p o-arm/stealth assisted Lumbar 3-5 fusion with pedicle screws on 7/7/2025 with Dr. Goodrich.    Clinically Significant Risk Factors Present on Admission          # Hypomagnesemia: Lowest Mg = 1.3 mg/dL in last 2 days, will replace as needed   # Hypoalbuminemia: Lowest albumin = 3.4 g/dL at 7/10/2025 12:57 PM, will monitor as appropriate               # DMII: A1C = 10.5 % (Ref range: <5.7 %) within past 6 months, PRESENT ON ADMISSION  # Obesity: Estimated body mass index is 30.98 kg/m  as calculated from the following:    Height as of this encounter: 1.829 m (6').    Weight as of this encounter: 103.6 kg (228 lb 6.3 oz)., PRESENT ON ADMISSION          Plan:  - Neuro checks/vital signs: Every 4 hours  - Pain control: PRN PO Oxycodone and atarax  - Activity: up as tolerated - encourage ambulation and sitting up in the chair  - Restrictions/Bracing: none  - Diet: Regular  - Anti-coagulation: HOLD Xarelto until POD 14  - DVT prophylaxis: SCDs & subcutaneous heparin   - Imaging:  UXR completed 7/10  - PT/OT: recommending TCU  - Pain Team Consult - appreciate recommendations   - Endocrinology Diabetes Consult - appreciate recommendations   - Increase bowel regimen      Interval History:  Patient reporting pain controlled. Patient more alert this AM. Patient report passing gas but no BM yet.  Bowel regimen increased.       Objective:   Temp:  [97.5  F (36.4  C)-98.2  F (36.8  C)] 97.7  F (36.5  C)  Pulse:  [71-92] 71  Resp:  [14-16] 16  BP: (100-125)/(57-88) 106/81  SpO2:  [85 %-100 %] 96 %  I/O last 3 completed shifts:  In: 75 [P.O.:75]  Out: 1150 [Urine:1150]    Gen: Appears comfortable, NAD  Wound: Incision, clean, dry, intact without strikethrough  Neurologic:  - Alert & Oriented to person, place, time, and situation  - Follows commands briskly  - Speech  fluent, spontaneous. No aphasia or dysarthria.  - No gaze preference. No apparent hemineglect.  - PERRL, EOMI  - Strong eye closure, jaw clench, and cheek puff  - Face symmetric with sensation intact to light touch  - Palate elevates symmetrically, uvula midline, tongue protrudes midline  - Trapezii and sternocleidomastoid muscles 5/5 bilaterally  - No pronator drift     Del Tr Bi WE WF Gr   R 5 5 5 5 5 5   L 5 5 5 5 5 5    HF KE KF DF PF EHL   R 5 5 5 5 5 5   L 5 5 5 5 5 5     Reflexes 2+ throughout    Sensation intact and symmetric to light touch throughout    LABS  Recent Labs   Lab Test 07/11/25  0647 07/10/25  1257 07/10/25  1029   WBC 5.2 5.9 5.3   HGB 8.4* 8.9* 8.5*   MCV 86 89 88    182 161       Recent Labs   Lab Test 07/11/25  0737 07/11/25  0647 07/11/25  0203 07/10/25  1317 07/10/25  1257 07/10/25  1140 07/10/25  1029   NA  --  137  --   --  137  --  139   POTASSIUM  --  4.1  --   --  4.2  --  4.2   CHLORIDE  --  100  --   --  101  --  104   CO2  --  27  --   --  26  --  26   BUN  --  9.0  --   --  11.6  --  11.8   CR  --  0.65  --   --  0.63  --  0.69   ANIONGAP  --  10  --   --  10  --  9   NANDO  --  9.4  --   --  9.7  --  9.7   * 175* 212*   < > 107*   < > 106*    < > = values in this interval not displayed.

## 2025-07-12 LAB
ANION GAP SERPL CALCULATED.3IONS-SCNC: 10 MMOL/L (ref 7–15)
BUN SERPL-MCNC: 10.8 MG/DL (ref 6–20)
CALCIUM SERPL-MCNC: 9.2 MG/DL (ref 8.8–10.4)
CHLORIDE SERPL-SCNC: 101 MMOL/L (ref 98–107)
CREAT SERPL-MCNC: 0.67 MG/DL (ref 0.51–0.95)
EGFRCR SERPLBLD CKD-EPI 2021: >90 ML/MIN/1.73M2
ERYTHROCYTE [DISTWIDTH] IN BLOOD BY AUTOMATED COUNT: 15 % (ref 10–15)
GLUCOSE BLDC GLUCOMTR-MCNC: 105 MG/DL (ref 70–99)
GLUCOSE BLDC GLUCOMTR-MCNC: 156 MG/DL (ref 70–99)
GLUCOSE BLDC GLUCOMTR-MCNC: 175 MG/DL (ref 70–99)
GLUCOSE BLDC GLUCOMTR-MCNC: 227 MG/DL (ref 70–99)
GLUCOSE SERPL-MCNC: 173 MG/DL (ref 70–99)
HCO3 SERPL-SCNC: 28 MMOL/L (ref 22–29)
HCT VFR BLD AUTO: 25.2 % (ref 35–47)
HGB BLD-MCNC: 7.8 G/DL (ref 11.7–15.7)
MAGNESIUM SERPL-MCNC: 1.7 MG/DL (ref 1.7–2.3)
MCH RBC QN AUTO: 26.5 PG (ref 26.5–33)
MCHC RBC AUTO-ENTMCNC: 31 G/DL (ref 31.5–36.5)
MCV RBC AUTO: 86 FL (ref 78–100)
PHOSPHATE SERPL-MCNC: 4 MG/DL (ref 2.5–4.5)
PLATELET # BLD AUTO: 213 10E3/UL (ref 150–450)
POTASSIUM SERPL-SCNC: 4 MMOL/L (ref 3.4–5.3)
RBC # BLD AUTO: 2.94 10E6/UL (ref 3.8–5.2)
SODIUM SERPL-SCNC: 139 MMOL/L (ref 135–145)
WBC # BLD AUTO: 4.9 10E3/UL (ref 4–11)

## 2025-07-12 PROCEDURE — 250N000013 HC RX MED GY IP 250 OP 250 PS 637: Performed by: NEUROLOGICAL SURGERY

## 2025-07-12 PROCEDURE — 99232 SBSQ HOSP IP/OBS MODERATE 35: CPT | Mod: 4MD | Performed by: STUDENT IN AN ORGANIZED HEALTH CARE EDUCATION/TRAINING PROGRAM

## 2025-07-12 PROCEDURE — 250N000013 HC RX MED GY IP 250 OP 250 PS 637

## 2025-07-12 PROCEDURE — 84100 ASSAY OF PHOSPHORUS: CPT

## 2025-07-12 PROCEDURE — 85048 AUTOMATED LEUKOCYTE COUNT: CPT

## 2025-07-12 PROCEDURE — 250N000013 HC RX MED GY IP 250 OP 250 PS 637: Performed by: NURSE PRACTITIONER

## 2025-07-12 PROCEDURE — 250N000012 HC RX MED GY IP 250 OP 636 PS 637

## 2025-07-12 PROCEDURE — 83735 ASSAY OF MAGNESIUM: CPT

## 2025-07-12 PROCEDURE — 250N000011 HC RX IP 250 OP 636: Performed by: NURSE PRACTITIONER

## 2025-07-12 PROCEDURE — 82947 ASSAY GLUCOSE BLOOD QUANT: CPT

## 2025-07-12 PROCEDURE — 120N000002 HC R&B MED SURG/OB UMMC

## 2025-07-12 PROCEDURE — 36415 COLL VENOUS BLD VENIPUNCTURE: CPT

## 2025-07-12 RX ORDER — MAGNESIUM OXIDE 400 MG/1
400 TABLET ORAL EVERY 4 HOURS
Status: COMPLETED | OUTPATIENT
Start: 2025-07-12 | End: 2025-07-12

## 2025-07-12 RX ADMIN — HEPARIN SODIUM 5000 UNITS: 5000 INJECTION, SOLUTION INTRAVENOUS; SUBCUTANEOUS at 11:40

## 2025-07-12 RX ADMIN — SERTRALINE HYDROCHLORIDE 100 MG: 100 TABLET ORAL at 08:08

## 2025-07-12 RX ADMIN — GABAPENTIN 100 MG: 100 CAPSULE ORAL at 08:08

## 2025-07-12 RX ADMIN — ACETAMINOPHEN 975 MG: 325 TABLET ORAL at 05:21

## 2025-07-12 RX ADMIN — HEPARIN SODIUM 5000 UNITS: 5000 INJECTION, SOLUTION INTRAVENOUS; SUBCUTANEOUS at 02:41

## 2025-07-12 RX ADMIN — OXYCODONE HYDROCHLORIDE 5 MG: 5 TABLET ORAL at 02:41

## 2025-07-12 RX ADMIN — HYDROXYZINE HYDROCHLORIDE 50 MG: 50 TABLET ORAL at 20:49

## 2025-07-12 RX ADMIN — MAGNESIUM OXIDE TAB 400 MG (241.3 MG ELEMENTAL MG) 400 MG: 400 (241.3 MG) TAB at 12:57

## 2025-07-12 RX ADMIN — INSULIN GLARGINE 60 UNITS: 100 INJECTION, SOLUTION SUBCUTANEOUS at 11:40

## 2025-07-12 RX ADMIN — LIDOCAINE 2 PATCH: 4 PATCH TOPICAL at 20:48

## 2025-07-12 RX ADMIN — FAMOTIDINE 20 MG: 20 TABLET, FILM COATED ORAL at 20:49

## 2025-07-12 RX ADMIN — Medication 250 MG: at 18:10

## 2025-07-12 RX ADMIN — QUETIAPINE FUMARATE 400 MG: 400 TABLET ORAL at 22:23

## 2025-07-12 RX ADMIN — HEPARIN SODIUM 5000 UNITS: 5000 INJECTION, SOLUTION INTRAVENOUS; SUBCUTANEOUS at 20:49

## 2025-07-12 RX ADMIN — GABAPENTIN 300 MG: 300 CAPSULE ORAL at 22:23

## 2025-07-12 RX ADMIN — OXYCODONE HYDROCHLORIDE 5 MG: 5 TABLET ORAL at 11:50

## 2025-07-12 RX ADMIN — Medication 250 MG: at 03:04

## 2025-07-12 RX ADMIN — OXYCODONE HYDROCHLORIDE 5 MG: 5 TABLET ORAL at 17:00

## 2025-07-12 RX ADMIN — MAGNESIUM OXIDE TAB 400 MG (241.3 MG ELEMENTAL MG) 400 MG: 400 (241.3 MG) TAB at 16:55

## 2025-07-12 RX ADMIN — GABAPENTIN 100 MG: 100 CAPSULE ORAL at 20:49

## 2025-07-12 RX ADMIN — ACETAMINOPHEN 975 MG: 325 TABLET ORAL at 12:57

## 2025-07-12 RX ADMIN — BUPRENORPHINE HYDROCHLORIDE 600 MCG: 600 FILM, SOLUBLE BUCCAL at 08:09

## 2025-07-12 RX ADMIN — FAMOTIDINE 20 MG: 20 TABLET, FILM COATED ORAL at 08:08

## 2025-07-12 RX ADMIN — Medication 25 MCG: at 08:09

## 2025-07-12 RX ADMIN — BUPRENORPHINE HYDROCHLORIDE 600 MCG: 600 FILM, SOLUBLE BUCCAL at 20:53

## 2025-07-12 RX ADMIN — HYDROXYZINE HYDROCHLORIDE 50 MG: 50 TABLET ORAL at 14:46

## 2025-07-12 RX ADMIN — ACETAMINOPHEN 975 MG: 325 TABLET ORAL at 22:23

## 2025-07-12 ASSESSMENT — ACTIVITIES OF DAILY LIVING (ADL)
ADLS_ACUITY_SCORE: 40
ADLS_ACUITY_SCORE: 43
ADLS_ACUITY_SCORE: 40
ADLS_ACUITY_SCORE: 36
ADLS_ACUITY_SCORE: 40
ADLS_ACUITY_SCORE: 40
ADLS_ACUITY_SCORE: 36
ADLS_ACUITY_SCORE: 43
ADLS_ACUITY_SCORE: 40
ADLS_ACUITY_SCORE: 43
ADLS_ACUITY_SCORE: 40
ADLS_ACUITY_SCORE: 36
ADLS_ACUITY_SCORE: 40

## 2025-07-12 NOTE — PLAN OF CARE
Status:  POD #4 L3-5 fusion with pedicle screws. Stay c/b by pain and insulin drip. Hx of metastatic breast CA, smoker, DVT, schizoaffective disorder, bipolar disorder, chronic pain.  Vitals: VSS on RA - con pulse ox in place.   Neuros: A&Ox4. 4/5 t/o. GW. BL n/t to BLEs.   IV: PIV SL x2.  Labs/Electrolytes: ACHS.   Resp: LSC - diminished in lower lobes - denies SOB.   Diet: Reg w/ CHO cov.   GI: LBM 7/11 earlier this pm. Large.   : VSP w/ urgency per pt report.  Skin: Back inci - ALEXI w/ sutures. PIV infiltration site on R arm w/ trace edema. Previous hemovac site covered w/ primapore - CDI.   Pain: 8-10/10 back - trx w/ sched & PRNs.   Activity: A1-2/GB/FWW. Commode.   SCDs on?: Yes.   Social: Son at bedside & supportive.   Plan: Con to monitor while follow POC. Discharge to TCU.   Updates this shift: Daily routine maintained.   Education completed: Reviewed daily care expectations.    Goal Outcome Evaluation:      Plan of Care Reviewed With: patient    Overall Patient Progress: no change    Outcome Evaluation: Daily routine maintained.

## 2025-07-12 NOTE — PROGRESS NOTES
[facility], [place]   Neurosurgery Progress Note:    Date of service: 7/13/2025    Interval History:  No acute interval events; remains full strength  Requires OBRA Level 2 before discharge, pending    Assessment: Teresa Sanderson is a 49 year old female s/p o-arm/stealth assisted Lumbar 3-5 fusion with pedicle screws on 7/7/2025 with Dr. Goodrich.    Clinically Significant Risk Factors Present on Admission            # Hypoalbuminemia: Lowest albumin = 3.4 g/dL at 7/10/2025 12:57 PM, will monitor as appropriate               # DMII: A1C = 10.5 % (Ref range: <5.7 %) within past 6 months   # Obesity: Estimated body mass index is 30.98 kg/m  as calculated from the following:    Height as of this encounter: 1.829 m (6').    Weight as of this encounter: 103.6 kg (228 lb 6.3 oz).         Plan:  - Neuro checks/vital signs: Every 4 hours  - Pain control: PRN PO Oxycodone and atarax  - Activity: up as tolerated - encourage ambulation and sitting up in the chair  - Restrictions/Bracing: none  - Diet: Regular  - Anti-coagulation: HOLD Xarelto until POD 14  - Ok to start chemotherapy on POD 14   - DVT prophylaxis: SCDs & subcutaneous heparin   - Imaging:  UXR completed 7/10  - Pain Team Consult - appreciate recommendations   - Endocrinology Diabetes Consult - appreciate recommendations   - Maintain Bowel regimen  - PT/OT: recommending TCU, pending OBRA II eval    Staff: Dr. Goodrich    -----------------------------------------------------------------------------------  Bo Vazquez MD  Neurosurgery  Pager: [phone]     Please contact neurosurgery resident on call with questions.    Dial * * [phone], enter [phone] when prompted.   -----------------------------------------------------------------------------------  Objective:   Temp:  [98  F (36.7  C)-98.5  F (36.9  C)] 98  F (36.7  C)  Pulse:  [75-82] 82  Resp:  [16-18] 16  BP: ()/(70-83) 107/70  SpO2:  [93 %-98 %] 93 %  I/O last 3 completed shifts:  In: -    Out: 1500 [Urine:1500]    Gen: Appears comfortable, NAD  Wound: Incision, clean, dry, intact without strikethrough  Neurologic:  - Alert & Oriented to person, place, time, and situation  - Follows commands briskly  - Speech fluent, spontaneous. No aphasia or dysarthria.  - No gaze preference. No apparent hemineglect.  - PERRL, EOMI  - Strong eye closure, jaw clench, and cheek puff  - Face symmetric with sensation intact to light touch  - Palate elevates symmetrically, uvula midline, tongue protrudes midline  - Trapezii and sternocleidomastoid muscles 5/5 bilaterally  - No pronator drift     Del Tr Bi WE WF Gr   R 5 5 5 5 5 5   L 5 5 5 5 5 5    HF KE KF DF PF EHL   R 5 5 5 5 5 5   L 5 5 5 5 5 5     Reflexes 2+ throughout    Sensation intact and symmetric to light touch throughout    LABS  Recent Labs   Lab Test 07/12/25  0719 07/11/25  0647 07/10/25  1257   WBC 4.9 5.2 5.9   HGB 7.8* 8.4* 8.9*   MCV 86 86 89    179 182       Recent Labs   Lab Test 07/13/25  0303 07/12/25  2221 07/12/25  1548 07/12/25  0917 07/12/25  0719 07/11/25  0737 07/11/25  0647 07/10/25  1317 07/10/25  1257   NA  --   --   --   --  139  --  137  --  137   POTASSIUM  --   --   --   --  4.0  --  4.1  --  4.2   CHLORIDE  --   --   --   --  101  --  100  --  101   CO2  --   --   --   --  28  --  27  --  26   BUN  --   --   --   --  10.8  --  9.0  --  11.6   CR  --   --   --   --  0.67  --  0.65  --  0.63   ANIONGAP  --   --   --   --  10  --  10  --  10   NANDO  --   --   --   --  9.2  --  9.4  --  9.7   * 105* 227*   < > 173*   < > 175*   < > 107*    < > = values in this interval not displayed.   I have seen this patient with the resident and formulated a plan and agree with this note.  AMP

## 2025-07-12 NOTE — PROGRESS NOTES
[facility], [place]   Neurosurgery Progress Note:    Date of service: 7/12/2025    Interval History:  No acute interval events; remains full strength  Patient reporting fluctuating pain control but largely controlled with PO pain medications  Bowel regimen increased to good effect  Requires OBRA Level 2 before discharge, pending    Assessment: Teresa Sanderson is a 49 year old female s/p o-arm/stealth assisted Lumbar 3-5 fusion with pedicle screws on 7/7/2025 with Dr. Goodrich.    Clinically Significant Risk Factors Present on Admission          # Hypomagnesemia: Lowest Mg = 1.3 mg/dL in last 2 days, will replace as needed   # Hypoalbuminemia: Lowest albumin = 3.4 g/dL at 7/10/2025 12:57 PM, will monitor as appropriate               # DMII: A1C = 10.5 % (Ref range: <5.7 %) within past 6 months   # Obesity: Estimated body mass index is 30.98 kg/m  as calculated from the following:    Height as of this encounter: 1.829 m (6').    Weight as of this encounter: 103.6 kg (228 lb 6.3 oz).           Plan:  - Neuro checks/vital signs: Every 4 hours  - Pain control: PRN PO Oxycodone and atarax  - Activity: up as tolerated - encourage ambulation and sitting up in the chair  - Restrictions/Bracing: none  - Diet: Regular  - Anti-coagulation: HOLD Xarelto until POD 14  - DVT prophylaxis: SCDs & subcutaneous heparin   - Imaging:  UXR completed 7/10  - Pain Team Consult - appreciate recommendations   - Endocrinology Diabetes Consult - appreciate recommendations   - Maintain Bowel regimen    - PT/OT: recommending TCU, pending OBRA II eval        Objective:   Temp:  [97.7  F (36.5  C)-98.1  F (36.7  C)] 98.1  F (36.7  C)  Pulse:  [71-86] 83  Resp:  [16-18] 18  BP: (106-121)/(66-81) 121/76  SpO2:  [93 %-100 %] 99 %  I/O last 3 completed shifts:  In: 75 [P.O.:75]  Out: -     Gen: Appears comfortable, NAD  Wound: Incision, clean, dry, intact without strikethrough  Neurologic:  - Alert & Oriented to person,  place, time, and situation  - Follows commands briskly  - Speech fluent, spontaneous. No aphasia or dysarthria.  - No gaze preference. No apparent hemineglect.  - PERRL, EOMI  - Strong eye closure, jaw clench, and cheek puff  - Face symmetric with sensation intact to light touch  - Palate elevates symmetrically, uvula midline, tongue protrudes midline  - Trapezii and sternocleidomastoid muscles 5/5 bilaterally  - No pronator drift     Del Tr Bi WE WF Gr   R 5 5 5 5 5 5   L 5 5 5 5 5 5    HF KE KF DF PF EHL   R 5 5 5 5 5 5   L 5 5 5 5 5 5     Reflexes 2+ throughout    Sensation intact and symmetric to light touch throughout    LABS  Recent Labs   Lab Test 07/11/25  0647 07/10/25  1257 07/10/25  1029   WBC 5.2 5.9 5.3   HGB 8.4* 8.9* 8.5*   MCV 86 89 88    182 161       Recent Labs   Lab Test 07/11/25  2204 07/11/25  1616 07/11/25  1133 07/11/25  0737 07/11/25  0647 07/10/25  1317 07/10/25  1257 07/10/25  1140 07/10/25  1029   NA  --   --   --   --  137  --  137  --  139   POTASSIUM  --   --   --   --  4.1  --  4.2  --  4.2   CHLORIDE  --   --   --   --  100  --  101  --  104   CO2  --   --   --   --  27  --  26  --  26   BUN  --   --   --   --  9.0  --  11.6  --  11.8   CR  --   --   --   --  0.65  --  0.63  --  0.69   ANIONGAP  --   --   --   --  10  --  10  --  9   NANDO  --   --   --   --  9.4  --  9.7  --  9.7   * 134* 215*   < > 175*   < > 107*   < > 106*    < > = values in this interval not displayed.

## 2025-07-12 NOTE — PLAN OF CARE
[id number]    No acute events this shift. Pt was lethargic this AM, per pt that is her baseline because she goes to sleep around 0300/0400. Pt up walking with SBA to commode, feels as though she is improving and looking forward to going home. VSS. NAD. Family from [place] at bedside.     Goal Outcome Evaluation:      Plan of Care Reviewed With: patient    Overall Patient Progress: improvingOverall Patient Progress: improving

## 2025-07-12 NOTE — PROGRESS NOTES
Inpatient Diabetes Management Service: Daily Progress Note     HPI:   49 year old female with multiple chronic comorbidites and recent L3-L5 spinal fusion, now with new onset severe hyperglycemia in the setting of poorly controlled or previously undiagnosed diabetes mellitus (A1C 10.5%).      During this hospitalization she has been on IV insulin since 7/7.  We have been overlapping it with subcutaneous prandial aspart. IV insulin discontinued on 07/10       Diabetes relevant meds  Aspart 1 unit/3 grams with meals and snacks  Correctional scale  Glargine 30 units/day         Assessment/Plan:     Assessment:   DM2 with hyperglycemia and high insulin requirement  Stress hyperglycemia  Steroid hyperglycemia due to dexamethasone, resolved.    Currently fasting and post prandial readings at goal.     No changes in insulin regimen today.     Plan/Recommendations:   -- Continue insulin aspart 1 unit/3 grams of carb TID AC and snacks.  - Continue insulin apart high dose correction scale 1 per 25 gram TID AC and HS.  --Continue  Lantus 60 units /day   -- Diabetes education should be given on insulin administration and carb counting   -- Hypoglycemia protocol  - Carb counting protocol     Discharge plan: TBD    - Phamracy laision consult completed. Insulin lantus and novolog flex pens covered with 0 co pay. Glucometer covered with zero co pay. Will likely go on basal bolus insulin regimen.      Please notify Inpatient Diabetes Service if changes are planned to steroids, nutrition, TPN/TF and anticipated procedures requiring prolonged NPO status.         Interval History/Review of Systems :   The last 24 hours progress and nursing notes reviewed.    Doing well. Nurse showed her how to inject insulin last evening.      Planned Procedures/Surgeries: L3-L5 fusion on 07/07/2025        Recent Labs   Lab 07/12/25  0245 07/11/25  2204 07/11/25  1616 07/11/25  1133 07/11/25  0737 07/11/25  0647   * 163*  134* 215* 159* 175*             Medications Impacting Glycemia:   Steroids:  On the day of surgery   D5W-containing solutions/medications: none   Other medications impacting glucose: Olanzapine         Nutrition:   Orders Placed This Encounter      Advance Diet as Tolerated: Regular Diet Adult    Supplements: None   TF: None  TPN: None         Diabetes History: see full consult note for complete diabetes history   Diabetes Type and Duration: new diagnosis;   PTA Medication Regimen: none  Historical Diabetes Medications: none  Outpatient Diabetes Provider: none  Formal Diabetes Education/Educator: none        Physical Exam:   /76 (BP Location: Right arm)   Pulse 83   Temp 98.1  F (36.7  C) (Oral)   Resp 18   Ht 1.829 m (6')   Wt 103.6 kg (228 lb 6.3 oz)   SpO2 99%   BMI 30.98 kg/m    GENERAL: no distress; lying in bed   SKIN: Visible skin clear.   EYES: Eyes grossly normal to inspection.    NECK: no visible masses; no grossly visible goiter  RESP: No audible wheeze, cough, or increased work of breathing.    NEURO: Awake, alert, responds appropriately to questions.  Mentation and speech fluent.  PSYCH:affect normal,and appearance well-groomed.           Data:     Lab Results   Component Value Date    A1C 10.5 (H) 07/07/2025    A1C 8.6 (H) 06/19/2025    A1C 6.1 (H) 03/04/2025    A1C 6.0 (H) 09/11/2024    A1C 6.4 (H) 01/05/2023       ROUTINE IP LABS (Last four results)  BMP  Recent Labs   Lab 07/12/25  0245 07/11/25  2204 07/11/25  1616 07/11/25  1133 07/11/25  0737 07/11/25  0647 07/10/25  1317 07/10/25  1257 07/10/25  1140 07/10/25  1029 07/09/25  0704 07/09/25  0622   NA  --   --   --   --   --  137  --  137  --  139  --  138   POTASSIUM  --   --   --   --   --  4.1  --  4.2  --  4.2  --  4.5   CHLORIDE  --   --   --   --   --  100  --  101  --  104  --  103   NANDO  --   --   --   --   --  9.4  --  9.7  --  9.7  --  9.9   CO2  --   --   --   --   --  27  --  26  --  26  --  25   BUN  --   --   --   --    --  9.0  --  11.6  --  11.8  --  13.1   CR  --   --   --   --   --  0.65  --  0.63  --  0.69  --  0.90   * 163* 134* 215*   < > 175*   < > 107*   < > 106*   < > 142*    < > = values in this interval not displayed.     CBC  Recent Labs   Lab 07/11/25  0647 07/10/25  1257 07/10/25  1029 07/09/25  0622   WBC 5.2 5.9 5.3 6.3   RBC 3.16* 3.28* 3.13* 3.25*   HGB 8.4* 8.9* 8.5* 8.8*   HCT 27.2* 29.2* 27.6* 28.7*   MCV 86 89 88 88   MCH 26.6 27.1 27.2 27.1   MCHC 30.9* 30.5* 30.8* 30.7*   RDW 14.7 14.6 14.6 14.6    182 161 179     INRNo lab results found in last 7 days.    Inpatient Diabetes Service will continue to follow, please don't hesitate to contact the team with any questions or concerns.   Everett Rowe  PGY-5  Endocrine fellow      To contact Inpatient Diabetes Service:     7 AM - 5 PM: Page the "[employer] ALEKSANDRA following the patient that day (see filed or incomplete progress notes/consult notes under Endocrinology)    OR if uncertain of provider assignment: page job code [phone]    5 PM - 7 AM: First call after hours is to primary service.    For urgent after-hours questions, page job code for on call fellow: [phone]

## 2025-07-12 NOTE — PLAN OF CARE
/76 (BP Location: Right arm)   Pulse 83   Temp 98.1  F (36.7  C) (Oral)   Resp 18   Ht 1.829 m (6')   Wt 103.6 kg (228 lb 6.3 oz)   SpO2 99%   BMI 30.98 kg/m      [id number]    Goal Outcome Evaluation:      Plan of Care Reviewed With: patient    Overall Patient Progress: no change    Status:  POD #4 L3-5 fusion with pedicle screws. Stay c/b by pain and insulin drip. Hx of metastatic breast CA, smoker, DVT, schizoaffective disorder, bipolar disorder, chronic pain.  Vitals: VSS  Neuros: A/O x4; BUE 5/5; BLE 4/5; denies n/t  IV: PIV SL x2.  Labs/Electrolytes: ACHS; 0200 check 175   Resp: LSC - diminished in lower lobes; RA - cont pulse ox in place   Diet: Regular - carb coverage   GI: LBM 07/11   : VSP to BSC  Skin: Lower back inci, ALEXI; prev Hemovac site w/ primapore dressing, CDI; trace edema to R wrist and arm from prev IV infiltration  Pain: PRN oxy x1, PRN robaxin given x1 for, and sched tylenol given for 8-10/10 back pain  Activity: A x1 w/ GB & walker; up to BSC  Plan: Anticipated discharge to TCU; insulin administration teaching needed when administering pt's insulin; continue to follow POC

## 2025-07-13 ENCOUNTER — APPOINTMENT (OUTPATIENT)
Dept: OCCUPATIONAL THERAPY | Facility: CLINIC | Age: 50
DRG: 543 | End: 2025-07-13
Attending: NEUROLOGICAL SURGERY
Payer: MEDICARE

## 2025-07-13 LAB
ANION GAP SERPL CALCULATED.3IONS-SCNC: 10 MMOL/L (ref 7–15)
BUN SERPL-MCNC: 11.3 MG/DL (ref 6–20)
CALCIUM SERPL-MCNC: 9.5 MG/DL (ref 8.8–10.4)
CHLORIDE SERPL-SCNC: 102 MMOL/L (ref 98–107)
CREAT SERPL-MCNC: 0.69 MG/DL (ref 0.51–0.95)
EGFRCR SERPLBLD CKD-EPI 2021: >90 ML/MIN/1.73M2
ERYTHROCYTE [DISTWIDTH] IN BLOOD BY AUTOMATED COUNT: 15 % (ref 10–15)
GLUCOSE BLDC GLUCOMTR-MCNC: 105 MG/DL (ref 70–99)
GLUCOSE BLDC GLUCOMTR-MCNC: 121 MG/DL (ref 70–99)
GLUCOSE BLDC GLUCOMTR-MCNC: 121 MG/DL (ref 70–99)
GLUCOSE BLDC GLUCOMTR-MCNC: 197 MG/DL (ref 70–99)
GLUCOSE BLDC GLUCOMTR-MCNC: 223 MG/DL (ref 70–99)
GLUCOSE BLDC GLUCOMTR-MCNC: 69 MG/DL (ref 70–99)
GLUCOSE BLDC GLUCOMTR-MCNC: 70 MG/DL (ref 70–99)
GLUCOSE BLDC GLUCOMTR-MCNC: 89 MG/DL (ref 70–99)
GLUCOSE SERPL-MCNC: 122 MG/DL (ref 70–99)
HCO3 SERPL-SCNC: 27 MMOL/L (ref 22–29)
HCT VFR BLD AUTO: 27.5 % (ref 35–47)
HGB BLD-MCNC: 8.4 G/DL (ref 11.7–15.7)
MAGNESIUM SERPL-MCNC: 1.8 MG/DL (ref 1.7–2.3)
MCH RBC QN AUTO: 26.5 PG (ref 26.5–33)
MCHC RBC AUTO-ENTMCNC: 30.5 G/DL (ref 31.5–36.5)
MCV RBC AUTO: 87 FL (ref 78–100)
PHOSPHATE SERPL-MCNC: 4.6 MG/DL (ref 2.5–4.5)
PLATELET # BLD AUTO: 226 10E3/UL (ref 150–450)
POTASSIUM SERPL-SCNC: 4.4 MMOL/L (ref 3.4–5.3)
RBC # BLD AUTO: 3.17 10E6/UL (ref 3.8–5.2)
SODIUM SERPL-SCNC: 139 MMOL/L (ref 135–145)
WBC # BLD AUTO: 4.9 10E3/UL (ref 4–11)

## 2025-07-13 PROCEDURE — 250N000012 HC RX MED GY IP 250 OP 636 PS 637

## 2025-07-13 PROCEDURE — 36415 COLL VENOUS BLD VENIPUNCTURE: CPT

## 2025-07-13 PROCEDURE — 250N000011 HC RX IP 250 OP 636: Performed by: NURSE PRACTITIONER

## 2025-07-13 PROCEDURE — 250N000013 HC RX MED GY IP 250 OP 250 PS 637: Performed by: NEUROLOGICAL SURGERY

## 2025-07-13 PROCEDURE — 250N000013 HC RX MED GY IP 250 OP 250 PS 637: Performed by: NURSE PRACTITIONER

## 2025-07-13 PROCEDURE — 99232 SBSQ HOSP IP/OBS MODERATE 35: CPT | Mod: 4MD | Performed by: STUDENT IN AN ORGANIZED HEALTH CARE EDUCATION/TRAINING PROGRAM

## 2025-07-13 PROCEDURE — 250N000013 HC RX MED GY IP 250 OP 250 PS 637

## 2025-07-13 PROCEDURE — 84100 ASSAY OF PHOSPHORUS: CPT

## 2025-07-13 PROCEDURE — 120N000002 HC R&B MED SURG/OB UMMC

## 2025-07-13 PROCEDURE — 85027 COMPLETE CBC AUTOMATED: CPT

## 2025-07-13 PROCEDURE — 97530 THERAPEUTIC ACTIVITIES: CPT | Mod: GO | Performed by: OCCUPATIONAL THERAPIST

## 2025-07-13 PROCEDURE — 83735 ASSAY OF MAGNESIUM: CPT

## 2025-07-13 PROCEDURE — 84132 ASSAY OF SERUM POTASSIUM: CPT

## 2025-07-13 PROCEDURE — 97535 SELF CARE MNGMENT TRAINING: CPT | Mod: GO | Performed by: OCCUPATIONAL THERAPIST

## 2025-07-13 RX ORDER — AMOXICILLIN 250 MG
1 CAPSULE ORAL AT BEDTIME
Status: DISCONTINUED | OUTPATIENT
Start: 2025-07-13 | End: 2025-07-14

## 2025-07-13 RX ORDER — MAGNESIUM OXIDE 400 MG/1
400 TABLET ORAL EVERY 4 HOURS
Status: COMPLETED | OUTPATIENT
Start: 2025-07-13 | End: 2025-07-13

## 2025-07-13 RX ORDER — POLYETHYLENE GLYCOL 3350 17 G/17G
17 POWDER, FOR SOLUTION ORAL DAILY
Status: DISCONTINUED | OUTPATIENT
Start: 2025-07-13 | End: 2025-07-14

## 2025-07-13 RX ADMIN — SENNOSIDES AND DOCUSATE SODIUM 1 TABLET: 50; 8.6 TABLET ORAL at 22:15

## 2025-07-13 RX ADMIN — HYDROXYZINE HYDROCHLORIDE 50 MG: 50 TABLET ORAL at 11:19

## 2025-07-13 RX ADMIN — OXYCODONE HYDROCHLORIDE 5 MG: 5 TABLET ORAL at 19:53

## 2025-07-13 RX ADMIN — MAGNESIUM OXIDE TAB 400 MG (241.3 MG ELEMENTAL MG) 400 MG: 400 (241.3 MG) TAB at 15:13

## 2025-07-13 RX ADMIN — FAMOTIDINE 20 MG: 20 TABLET, FILM COATED ORAL at 21:00

## 2025-07-13 RX ADMIN — OXYCODONE HYDROCHLORIDE 5 MG: 5 TABLET ORAL at 15:13

## 2025-07-13 RX ADMIN — MAGNESIUM OXIDE TAB 400 MG (241.3 MG ELEMENTAL MG) 400 MG: 400 (241.3 MG) TAB at 11:07

## 2025-07-13 RX ADMIN — BUPRENORPHINE HYDROCHLORIDE 600 MCG: 600 FILM, SOLUBLE BUCCAL at 09:29

## 2025-07-13 RX ADMIN — HEPARIN SODIUM 5000 UNITS: 5000 INJECTION, SOLUTION INTRAVENOUS; SUBCUTANEOUS at 05:29

## 2025-07-13 RX ADMIN — HEPARIN SODIUM 5000 UNITS: 5000 INJECTION, SOLUTION INTRAVENOUS; SUBCUTANEOUS at 13:15

## 2025-07-13 RX ADMIN — GABAPENTIN 300 MG: 300 CAPSULE ORAL at 22:14

## 2025-07-13 RX ADMIN — LIDOCAINE 2 PATCH: 4 PATCH TOPICAL at 20:59

## 2025-07-13 RX ADMIN — QUETIAPINE FUMARATE 400 MG: 400 TABLET ORAL at 22:15

## 2025-07-13 RX ADMIN — HYDROXYZINE HYDROCHLORIDE 50 MG: 50 TABLET ORAL at 16:39

## 2025-07-13 RX ADMIN — ACETAMINOPHEN 975 MG: 325 TABLET ORAL at 05:29

## 2025-07-13 RX ADMIN — ACETAMINOPHEN 975 MG: 325 TABLET ORAL at 22:14

## 2025-07-13 RX ADMIN — SERTRALINE HYDROCHLORIDE 100 MG: 100 TABLET ORAL at 09:30

## 2025-07-13 RX ADMIN — POLYETHYLENE GLYCOL 3350 17 G: 17 POWDER, FOR SOLUTION ORAL at 11:07

## 2025-07-13 RX ADMIN — ACETAMINOPHEN 975 MG: 325 TABLET ORAL at 13:15

## 2025-07-13 RX ADMIN — GABAPENTIN 100 MG: 100 CAPSULE ORAL at 21:00

## 2025-07-13 RX ADMIN — BUPRENORPHINE HYDROCHLORIDE 600 MCG: 600 FILM, SOLUBLE BUCCAL at 21:00

## 2025-07-13 RX ADMIN — Medication 25 MCG: at 09:30

## 2025-07-13 RX ADMIN — INSULIN GLARGINE 60 UNITS: 100 INJECTION, SOLUTION SUBCUTANEOUS at 11:07

## 2025-07-13 RX ADMIN — FAMOTIDINE 20 MG: 20 TABLET, FILM COATED ORAL at 09:30

## 2025-07-13 RX ADMIN — OXYCODONE HYDROCHLORIDE 5 MG: 5 TABLET ORAL at 09:31

## 2025-07-13 RX ADMIN — Medication 250 MG: at 19:32

## 2025-07-13 RX ADMIN — HEPARIN SODIUM 5000 UNITS: 5000 INJECTION, SOLUTION INTRAVENOUS; SUBCUTANEOUS at 21:00

## 2025-07-13 RX ADMIN — GABAPENTIN 100 MG: 100 CAPSULE ORAL at 09:30

## 2025-07-13 ASSESSMENT — ACTIVITIES OF DAILY LIVING (ADL)
ADLS_ACUITY_SCORE: 35
ADLS_ACUITY_SCORE: 35
ADLS_ACUITY_SCORE: 34
ADLS_ACUITY_SCORE: 34
ADLS_ACUITY_SCORE: 35
ADLS_ACUITY_SCORE: 34
ADLS_ACUITY_SCORE: 35
ADLS_ACUITY_SCORE: 34
ADLS_ACUITY_SCORE: 34
ADLS_ACUITY_SCORE: 35
ADLS_ACUITY_SCORE: 34
ADLS_ACUITY_SCORE: 36
ADLS_ACUITY_SCORE: 34
ADLS_ACUITY_SCORE: 35
ADLS_ACUITY_SCORE: 35
ADLS_ACUITY_SCORE: 34

## 2025-07-13 NOTE — PLAN OF CARE
Pt is a/o x 4, VSS on RA. Pt complaining of lower back pain and pain to left buttock 8/10. Pt tolerating oxycodone 5mg, atarax and scheduled tylenol for pain.Author assessed pt's skin and there are no open areas, applied preventative sacral mepilex for comfort and ordered a GeoMat for when patient is up in chair. Lumbar fusion incision is ALEXI.   Pt has two left PIVs that are saline locked. Fair appetite with carb coverage/sliding scale coverage. Endocrine wants to send pt home with sub q insulin and pt will get diabetic education. Pt wants to go home on continuous glucose monitor and coverage is getting checked for this for discharge. Will continue pt's plan of care.

## 2025-07-13 NOTE — PLAN OF CARE
Goal Outcome Evaluation:      Plan of Care Reviewed With: patient    Overall Patient Progress: no changeOverall Patient Progress: no change    Outcome Evaluation: RN assumes cares at [phone]    VSS on RA. A/O. Neuros intact. Stand-by. Regular diet, carb coverage, ACHS. Chronic pain per pt, denies medication at this time. Denies Nausea. Voiding Spontaneously. No bm this shift. 2x PIV. Lower back incision - ALEXI. Previous hemovac site covered with primapore. R arm/R wrist edema from prior IV infiltration.

## 2025-07-13 NOTE — PROGRESS NOTES
Inpatient Diabetes Management Service: Daily Progress Note     HPI:   49 year old female with multiple chronic comorbidites and recent L3-L5 spinal fusion, now with new onset severe hyperglycemia in the setting of poorly controlled or previously undiagnosed diabetes mellitus (A1C 10.5%).      During this hospitalization she has been on IV insulin since 7/7.  We have been overlapping it with subcutaneous prandial aspart. IV insulin discontinued on 07/10       Diabetes relevant meds  Aspart 1 unit/3 grams with meals and snacks  Correctional scale  Glargine 30 units/day         Assessment/Plan:     Assessment:   DM2 with hyperglycemia and high insulin requirement  Stress hyperglycemia  Steroid hyperglycemia due to dexamethasone, resolved.    Currently fasting and post prandial readings at goal. Pt would like to be on continuous glucose monitor on discharge.    No changes in insulin regimen today.     Plan/Recommendations:   -- Continue insulin aspart 1 unit/3 grams of carb TID AC and snacks.  - Continue insulin apart high dose correction scale 1 per 25 gram TID AC and HS.  --Continue  Lantus 60 units /day   -- Diabetes education should be given on insulin administration and carb counting   -- Hypoglycemia protocol  - Carb counting protocol     Discharge plan: TBD    - Phamracy laision consult completed. Insulin lantus and novolog flex pens covered with 0 co pay. Glucometer covered with zero co pay. Will likely go on basal bolus insulin regimen.  - Pharmacy liaison consult placed for checking coverage for continuous glucose monitor.      Please notify Inpatient Diabetes Service if changes are planned to steroids, nutrition, TPN/TF and anticipated procedures requiring prolonged NPO status.         Interval History/Review of Systems :   The last 24 hours progress and nursing notes reviewed.    Doing well. Nurse showed her how to inject insulin last evening.      Planned Procedures/Surgeries: L3-L5  fusion on 07/07/2025        Recent Labs   Lab 07/13/25  1217 07/13/25  0939 07/13/25  0610 07/13/25  0303 07/12/25  2221 07/12/25  1548   * 105* 122* 121* 105* 227*             Medications Impacting Glycemia:   Steroids:  On the day of surgery   D5W-containing solutions/medications: none   Other medications impacting glucose: Olanzapine         Nutrition:   Orders Placed This Encounter      Advance Diet as Tolerated: Regular Diet Adult    Supplements: None   TF: None  TPN: None         Diabetes History: see full consult note for complete diabetes history   Diabetes Type and Duration: new diagnosis;   PTA Medication Regimen: none  Historical Diabetes Medications: none  Outpatient Diabetes Provider: none  Formal Diabetes Education/Educator: none        Physical Exam:   /72 (BP Location: Right arm, Cuff Size: Adult Large)   Pulse 69   Temp 98.2  F (36.8  C) (Oral)   Resp 16   Ht 1.829 m (6')   Wt 103.6 kg (228 lb 6.3 oz)   SpO2 96%   BMI 30.98 kg/m    GENERAL: no distress; lying in bed   SKIN: Visible skin clear.   EYES: Eyes grossly normal to inspection.    NECK: no visible masses; no grossly visible goiter  RESP: No audible wheeze, cough, or increased work of breathing.    NEURO: Awake, alert, responds appropriately to questions.  Mentation and speech fluent.  PSYCH:affect normal,and appearance well-groomed.           Data:     Lab Results   Component Value Date    A1C 10.5 (H) 07/07/2025    A1C 8.6 (H) 06/19/2025    A1C 6.1 (H) 03/04/2025    A1C 6.0 (H) 09/11/2024    A1C 6.4 (H) 01/05/2023       ROUTINE IP LABS (Last four results)  BMP  Recent Labs   Lab 07/13/25  1217 07/13/25  0939 07/13/25  0610 07/13/25  0303 07/12/25  0917 07/12/25  0719 07/11/25  0737 07/11/25  0647 07/10/25  1317 07/10/25  1257   NA  --   --  139  --   --  139  --  137  --  137   POTASSIUM  --   --  4.4  --   --  4.0  --  4.1  --  4.2   CHLORIDE  --   --  102  --   --  101  --  100  --  101   NANDO  --   --  9.5  --   --   9.2  --  9.4  --  9.7   CO2  --   --  27  --   --  28  --  27  --  26   BUN  --   --  11.3  --   --  10.8  --  9.0  --  11.6   CR  --   --  0.69  --   --  0.67  --  0.65  --  0.63   * 105* 122* 121*   < > 173*   < > 175*   < > 107*    < > = values in this interval not displayed.     CBC  Recent Labs   Lab 07/13/25  0610 07/12/25  0719 07/11/25  0647 07/10/25  1257   WBC 4.9 4.9 5.2 5.9   RBC 3.17* 2.94* 3.16* 3.28*   HGB 8.4* 7.8* 8.4* 8.9*   HCT 27.5* 25.2* 27.2* 29.2*   MCV 87 86 86 89   MCH 26.5 26.5 26.6 27.1   MCHC 30.5* 31.0* 30.9* 30.5*   RDW 15.0 15.0 14.7 14.6    213 179 182     INRNo lab results found in last 7 days.    Inpatient Diabetes Service will continue to follow, please don't hesitate to contact the team with any questions or concerns.   Everett Rowe  PGY-5  Endocrine fellow      To contact Inpatient Diabetes Service:     7 AM - 5 PM: Page the [employer] ALEKSANDRA following the patient that day (see filed or incomplete progress notes/consult notes under Endocrinology)    OR if uncertain of provider assignment: page job code [phone]    5 PM - 7 AM: First call after hours is to primary service.    For urgent after-hours questions, page job code for on call fellow: [phone]

## 2025-07-13 NOTE — PLAN OF CARE
Goal Outcome Evaluation:      Plan of Care Reviewed With: patient, family    Overall Patient Progress: improving    Outcome Evaluation: Daily routine maintained.    Status: Admitted , POD5 L3-5 fusion w/ pedicle screws, stay c/b pain and insulin gtt. PMHx of metastatic breast cancer, smoker, DVT, schizoaffective disorder, bipolar disorder and chronic pain  Vitals: VSS on RA, ex soft BP  Neuros: A/Ox4, can be lethargic in AM (pt stays awake till 0200 or later), BUE 5/5, BLE 4/5  IV: PIV SL x2  Labs/Electrolytes: BG ACHS, mag replace given per protocol  Resp: LSC on RA  Diet: Regular, carb coverage  GI: LBM , BS+, passing flatus  : VS to commode, got up to bathroom w/ assist this shift  Skin: lower back incision ALEXI, previous hemovac site covered w/ primamore scant serosang drainage, trace edema to R arm and R wrist d/t prev IV infiltration  Pain: 10/10 w/ no relief from pain meds, prn oxy x1, prn atarax x1, prn robaxin x1 and scheduled tylenol  Activity: A1/SB w/ GB+W (used commode but walked to bathroom x1 w/ SB)  SCDs on?: no, pt refuse  Social: Family from [place] visiting and supportive. Pt mentioned that her niece  of cancer today and pt sad, writer provided support  Plan: Anticipated discharge to TCU, pt wants to discharge home, insulin admin teaching needed still and carb counting/diet teaching  Updates this shift: Pt up SB to bathroom, printed out information on T2DM for pt and put on bedside table  Education completed: Diet education and carb counting, still need reinforcement and insulin admin teaching

## 2025-07-14 ENCOUNTER — APPOINTMENT (OUTPATIENT)
Dept: OCCUPATIONAL THERAPY | Facility: CLINIC | Age: 50
DRG: 457 | End: 2025-07-14
Attending: NEUROLOGICAL SURGERY
Payer: MEDICARE

## 2025-07-14 ENCOUNTER — APPOINTMENT (OUTPATIENT)
Dept: PHYSICAL THERAPY | Facility: CLINIC | Age: 50
DRG: 457 | End: 2025-07-14
Attending: NEUROLOGICAL SURGERY
Payer: MEDICARE

## 2025-07-14 LAB
ANION GAP SERPL CALCULATED.3IONS-SCNC: 9 MMOL/L (ref 7–15)
BUN SERPL-MCNC: 9.7 MG/DL (ref 6–20)
CALCIUM SERPL-MCNC: 9.5 MG/DL (ref 8.8–10.4)
CHLORIDE SERPL-SCNC: 102 MMOL/L (ref 98–107)
CREAT SERPL-MCNC: 0.7 MG/DL (ref 0.51–0.95)
EGFRCR SERPLBLD CKD-EPI 2021: >90 ML/MIN/1.73M2
ERYTHROCYTE [DISTWIDTH] IN BLOOD BY AUTOMATED COUNT: 15.2 % (ref 10–15)
GLUCOSE BLDC GLUCOMTR-MCNC: 101 MG/DL (ref 70–99)
GLUCOSE BLDC GLUCOMTR-MCNC: 121 MG/DL (ref 70–99)
GLUCOSE BLDC GLUCOMTR-MCNC: 83 MG/DL (ref 70–99)
GLUCOSE BLDC GLUCOMTR-MCNC: 85 MG/DL (ref 70–99)
GLUCOSE SERPL-MCNC: 167 MG/DL (ref 70–99)
HCO3 SERPL-SCNC: 26 MMOL/L (ref 22–29)
HCT VFR BLD AUTO: 26.3 % (ref 35–47)
HGB BLD-MCNC: 8 G/DL (ref 11.7–15.7)
MAGNESIUM SERPL-MCNC: 1.9 MG/DL (ref 1.7–2.3)
MCH RBC QN AUTO: 27 PG (ref 26.5–33)
MCHC RBC AUTO-ENTMCNC: 30.4 G/DL (ref 31.5–36.5)
MCV RBC AUTO: 89 FL (ref 78–100)
PHOSPHATE SERPL-MCNC: 4.2 MG/DL (ref 2.5–4.5)
PLATELET # BLD AUTO: 244 10E3/UL (ref 150–450)
POTASSIUM SERPL-SCNC: 4.3 MMOL/L (ref 3.4–5.3)
RBC # BLD AUTO: 2.96 10E6/UL (ref 3.8–5.2)
SODIUM SERPL-SCNC: 137 MMOL/L (ref 135–145)
WBC # BLD AUTO: 5.6 10E3/UL (ref 4–11)

## 2025-07-14 PROCEDURE — 250N000012 HC RX MED GY IP 250 OP 636 PS 637

## 2025-07-14 PROCEDURE — 99233 SBSQ HOSP IP/OBS HIGH 50: CPT | Performed by: CLINICAL NURSE SPECIALIST

## 2025-07-14 PROCEDURE — 250N000013 HC RX MED GY IP 250 OP 250 PS 637: Performed by: NURSE PRACTITIONER

## 2025-07-14 PROCEDURE — 85014 HEMATOCRIT: CPT

## 2025-07-14 PROCEDURE — 97530 THERAPEUTIC ACTIVITIES: CPT | Mod: GO

## 2025-07-14 PROCEDURE — 84100 ASSAY OF PHOSPHORUS: CPT

## 2025-07-14 PROCEDURE — 97530 THERAPEUTIC ACTIVITIES: CPT | Mod: GP

## 2025-07-14 PROCEDURE — 82374 ASSAY BLOOD CARBON DIOXIDE: CPT

## 2025-07-14 PROCEDURE — 97116 GAIT TRAINING THERAPY: CPT | Mod: GP

## 2025-07-14 PROCEDURE — 83735 ASSAY OF MAGNESIUM: CPT

## 2025-07-14 PROCEDURE — 250N000013 HC RX MED GY IP 250 OP 250 PS 637

## 2025-07-14 PROCEDURE — 120N000002 HC R&B MED SURG/OB UMMC

## 2025-07-14 PROCEDURE — 250N000011 HC RX IP 250 OP 636: Performed by: NURSE PRACTITIONER

## 2025-07-14 PROCEDURE — 36415 COLL VENOUS BLD VENIPUNCTURE: CPT

## 2025-07-14 RX ORDER — POLYETHYLENE GLYCOL 3350 17 G/17G
17 POWDER, FOR SOLUTION ORAL DAILY
DISCHARGE
Start: 2025-07-14 | End: 2025-07-15

## 2025-07-14 RX ORDER — NALOXONE HYDROCHLORIDE 0.4 MG/ML
0.2 INJECTION, SOLUTION INTRAMUSCULAR; INTRAVENOUS; SUBCUTANEOUS ONCE
DISCHARGE
Start: 2025-07-14 | End: 2025-07-15

## 2025-07-14 RX ORDER — HEPARIN SODIUM 5000 [USP'U]/.5ML
5000 INJECTION, SOLUTION INTRAVENOUS; SUBCUTANEOUS EVERY 8 HOURS
DISCHARGE
Start: 2025-07-14 | End: 2025-07-15

## 2025-07-14 RX ORDER — BISACODYL 10 MG
10 SUPPOSITORY, RECTAL RECTAL DAILY PRN
DISCHARGE
Start: 2025-07-14 | End: 2025-07-15

## 2025-07-14 RX ORDER — OLANZAPINE 5 MG/1
5 TABLET, FILM COATED ORAL
DISCHARGE
Start: 2025-07-14 | End: 2025-07-15

## 2025-07-14 RX ORDER — POLYETHYLENE GLYCOL 3350 17 G/17G
17 POWDER, FOR SOLUTION ORAL 2 TIMES DAILY
Status: DISCONTINUED | OUTPATIENT
Start: 2025-07-14 | End: 2025-07-14

## 2025-07-14 RX ORDER — QUETIAPINE FUMARATE 400 MG/1
400 TABLET, FILM COATED ORAL AT BEDTIME
DISCHARGE
Start: 2025-07-14 | End: 2025-07-15

## 2025-07-14 RX ORDER — PROCHLORPERAZINE MALEATE 10 MG
10 TABLET ORAL EVERY 6 HOURS PRN
DISCHARGE
Start: 2025-07-14 | End: 2025-07-15

## 2025-07-14 RX ORDER — GABAPENTIN 300 MG/1
300 CAPSULE ORAL AT BEDTIME
Status: ACTIVE | DISCHARGE
Start: 2025-07-14 | End: 2025-07-15

## 2025-07-14 RX ORDER — AMOXICILLIN 250 MG
2 CAPSULE ORAL 2 TIMES DAILY
DISCHARGE
Start: 2025-07-14 | End: 2025-07-15

## 2025-07-14 RX ORDER — FAMOTIDINE 20 MG/1
20 TABLET, FILM COATED ORAL 2 TIMES DAILY
DISCHARGE
Start: 2025-07-14 | End: 2025-07-15

## 2025-07-14 RX ORDER — OXYCODONE HYDROCHLORIDE 5 MG/1
5 TABLET ORAL EVERY 4 HOURS PRN
Status: SHIPPED | DISCHARGE
Start: 2025-07-14 | End: 2025-07-15

## 2025-07-14 RX ORDER — ACETAMINOPHEN 325 MG/1
975 TABLET ORAL EVERY 8 HOURS
DISCHARGE
Start: 2025-07-14 | End: 2025-07-15

## 2025-07-14 RX ORDER — NALOXONE HYDROCHLORIDE 0.4 MG/ML
0.4 INJECTION, SOLUTION INTRAMUSCULAR; INTRAVENOUS; SUBCUTANEOUS ONCE
DISCHARGE
Start: 2025-07-14 | End: 2025-07-15

## 2025-07-14 RX ORDER — POLYETHYLENE GLYCOL 3350 17 G/17G
17 POWDER, FOR SOLUTION ORAL 2 TIMES DAILY
Status: DISCONTINUED | OUTPATIENT
Start: 2025-07-14 | End: 2025-07-15 | Stop reason: HOSPADM

## 2025-07-14 RX ORDER — MAGNESIUM CARB/ALUMINUM HYDROX 105-160MG
296 TABLET,CHEWABLE ORAL ONCE
Status: COMPLETED | OUTPATIENT
Start: 2025-07-14 | End: 2025-07-14

## 2025-07-14 RX ORDER — LIDOCAINE 4 G/G
2 PATCH TOPICAL EVERY 24 HOURS
DISCHARGE
Start: 2025-07-14 | End: 2025-07-15

## 2025-07-14 RX ORDER — AMOXICILLIN 250 MG
2 CAPSULE ORAL 2 TIMES DAILY
Status: DISCONTINUED | OUTPATIENT
Start: 2025-07-14 | End: 2025-07-15 | Stop reason: HOSPADM

## 2025-07-14 RX ORDER — ONDANSETRON 4 MG/1
4 TABLET, ORALLY DISINTEGRATING ORAL EVERY 6 HOURS PRN
DISCHARGE
Start: 2025-07-14 | End: 2025-07-15

## 2025-07-14 RX ORDER — SERTRALINE HYDROCHLORIDE 100 MG/1
100 TABLET, FILM COATED ORAL EVERY MORNING
DISCHARGE
Start: 2025-07-15 | End: 2025-07-15

## 2025-07-14 RX ORDER — HYDROXYZINE HYDROCHLORIDE 50 MG/1
50 TABLET, FILM COATED ORAL 4 TIMES DAILY PRN
DISCHARGE
Start: 2025-07-14 | End: 2025-07-15

## 2025-07-14 RX ORDER — GABAPENTIN 100 MG/1
100 CAPSULE ORAL 2 TIMES DAILY
Status: ACTIVE | DISCHARGE
Start: 2025-07-14 | End: 2025-07-15

## 2025-07-14 RX ORDER — METHOCARBAMOL 500 MG/1
250 TABLET, FILM COATED ORAL 4 TIMES DAILY PRN
DISCHARGE
Start: 2025-07-14 | End: 2025-07-15

## 2025-07-14 RX ADMIN — INSULIN GLARGINE 60 UNITS: 100 INJECTION, SOLUTION SUBCUTANEOUS at 10:03

## 2025-07-14 RX ADMIN — OXYCODONE HYDROCHLORIDE 5 MG: 5 TABLET ORAL at 21:07

## 2025-07-14 RX ADMIN — FAMOTIDINE 20 MG: 20 TABLET, FILM COATED ORAL at 21:20

## 2025-07-14 RX ADMIN — OXYCODONE HYDROCHLORIDE 5 MG: 5 TABLET ORAL at 12:37

## 2025-07-14 RX ADMIN — SERTRALINE HYDROCHLORIDE 100 MG: 100 TABLET ORAL at 08:42

## 2025-07-14 RX ADMIN — FAMOTIDINE 20 MG: 20 TABLET, FILM COATED ORAL at 08:42

## 2025-07-14 RX ADMIN — GABAPENTIN 100 MG: 100 CAPSULE ORAL at 08:42

## 2025-07-14 RX ADMIN — POLYETHYLENE GLYCOL 3350 17 G: 17 POWDER, FOR SOLUTION ORAL at 10:03

## 2025-07-14 RX ADMIN — Medication 25 MCG: at 08:42

## 2025-07-14 RX ADMIN — HEPARIN SODIUM 5000 UNITS: 5000 INJECTION, SOLUTION INTRAVENOUS; SUBCUTANEOUS at 03:16

## 2025-07-14 RX ADMIN — HYDROXYZINE HYDROCHLORIDE 50 MG: 50 TABLET ORAL at 03:15

## 2025-07-14 RX ADMIN — OXYCODONE HYDROCHLORIDE 5 MG: 5 TABLET ORAL at 08:58

## 2025-07-14 RX ADMIN — BUPRENORPHINE HYDROCHLORIDE 600 MCG: 600 FILM, SOLUBLE BUCCAL at 08:42

## 2025-07-14 RX ADMIN — LIDOCAINE 2 PATCH: 4 PATCH TOPICAL at 21:07

## 2025-07-14 RX ADMIN — Medication 250 MG: at 09:35

## 2025-07-14 RX ADMIN — ACETAMINOPHEN 975 MG: 325 TABLET ORAL at 05:05

## 2025-07-14 RX ADMIN — QUETIAPINE FUMARATE 400 MG: 400 TABLET ORAL at 21:20

## 2025-07-14 RX ADMIN — BUPRENORPHINE HYDROCHLORIDE 600 MCG: 600 FILM, SOLUBLE BUCCAL at 21:08

## 2025-07-14 RX ADMIN — OXYCODONE HYDROCHLORIDE 5 MG: 5 TABLET ORAL at 16:46

## 2025-07-14 RX ADMIN — HEPARIN SODIUM 5000 UNITS: 5000 INJECTION, SOLUTION INTRAVENOUS; SUBCUTANEOUS at 12:36

## 2025-07-14 RX ADMIN — GABAPENTIN 300 MG: 300 CAPSULE ORAL at 21:20

## 2025-07-14 RX ADMIN — ACETAMINOPHEN 975 MG: 325 TABLET ORAL at 21:20

## 2025-07-14 RX ADMIN — Medication 250 MG: at 16:09

## 2025-07-14 RX ADMIN — OXYCODONE HYDROCHLORIDE 5 MG: 5 TABLET ORAL at 05:05

## 2025-07-14 RX ADMIN — Medication 250 MG: at 03:15

## 2025-07-14 RX ADMIN — HEPARIN SODIUM 5000 UNITS: 5000 INJECTION, SOLUTION INTRAVENOUS; SUBCUTANEOUS at 21:08

## 2025-07-14 RX ADMIN — GABAPENTIN 100 MG: 100 CAPSULE ORAL at 21:07

## 2025-07-14 RX ADMIN — ACETAMINOPHEN 975 MG: 325 TABLET ORAL at 13:59

## 2025-07-14 RX ADMIN — MAGNESIUM CITRATE 296 ML: 1.75 LIQUID ORAL at 10:03

## 2025-07-14 RX ADMIN — HYDROXYZINE HYDROCHLORIDE 50 MG: 50 TABLET ORAL at 09:35

## 2025-07-14 RX ADMIN — OXYCODONE HYDROCHLORIDE 5 MG: 5 TABLET ORAL at 00:45

## 2025-07-14 NOTE — DISCHARGE INSTRUCTIONS
[facility] - accepted for home RN/PT/OT  Ph: [phone]  Fax: [phone]    Diabetes Management Discharge Plan    Blood glucose monitoring: Three times daily before meals, and at bedtime.  Blood Glucose (BG) goals:  mg/dL before meals, less than 180 mg/dL 2 hrs after meals.     Glucose Control Regimen:     1) Long-acting insulin: glargine (Lantus) - take 50 units once daily at 10 am. Take at same time each day.     2) Rapid-acting insulin: aspart (Novolog) carbohydrate coverage/mealtime insulin -     Take 30 units before Large meals that contain carbohydrates (150 -170 grams of carbs)  Take 20 units before average meals or snacks that contain carbohydrates (100 -120 grams of carbs)   Take 10 units before small meals that contain carbohydrates (50 -70 grams of carbs)    4) Rapid-acting insulin: aspart (Novolog) correction - see chart below. Take for high blood glucoses three times daily before meals and at bedtime.   You may add the correction dose to the carbohydrate coverage/mealtime insulin dose and give in one injection--ideally 10-15 minutes before a meal.  You may take the correction dose even if you skip a meal (as long as it has been 4 hours since previous correction dose).     Pre-meal correction scale:    Blood Glucose Insulin Novolog Before Meals:   Less than 140 0 units   140 -164  1 units   165 -189 2 units   190 - 214 3 units   215 - 239 4 units    240 - 264  5 units   265 - 289 6 units   290 - 314 7 units   315 - 339 8 units   340 - 364 9 units   365 or more 10 units       Bedtime correction scale:     Blood Glucose Insulin Novolog At Bedtime:   Less than 200 0 units   200 - 224 1 units   225 - 249 2 units   250 - 274 3 units   275 - 299  4 units    300 - 324  5 units   325 - 349 6 units   350 or more 7 units         Outpatient Diabetes Follow-Up: Follow up with your Primary Care Provider (PCP) in 1-2 weeks, bring glucose data to your appointment. An appointment request was sent to  the [facility] coordinator to schedule your outpatient diabetes appointment 1-2 weeks from discharge. You can call the [facility] at [phone] if you have scheduling questions or do not hear from them within a few days of discharge. Follow up sooner if blood glucose runs consistently greater than 200 mg/dL or if having more than two episodes less than 70 mg/dL.     If you have urgent questions or concerns regarding your blood sugars or insulin, you may contact [phone] (the main hospital ). Ask to speak with the Endocrinologist on call.     Your target A1c value is less than 7% to help prevent future complications from diabetes. Your most recent A1c is 10.5%.      Thank you for letting the Diabetes Management Team be involved in your care!      Hypoglycemia (Low Blood Glucose) Management:  Signs/symptoms:  Shaking, sweating, fast heartbeat  Feeling dizzy, tired, or weak   Feeling anxious and easy to irritate  Feeling nervous, crabby, or confused  Hunger  Vision problems, headache  Numb or tingling mouth    If blood glucose is 51 to 70:   Eat or drink 15 grams of carbohydrate. Examples:   1/2 cup (4 ounces) apple juice or regular soda pop, or   1 cup (8 ounces) milk, or   15 skittles, or   3 to 4 glucose tablets.   Re-check your blood glucose in 15 minutes.   Repeat these steps every 15 minutes until your blood glucose is above 80.       If blood glucose is 50 or less:   Eat or drink 30 grams of carbohydrate. Examples:   1 cup (8 ounces) apple juice or regular soda pop, or   2 cups (16 ounces) milk, or   1 banana, or   6 to 8 glucose tablets.   Re-check your blood glucose in 15 minutes.   Repeat these steps every 15 minutes until your blood glucose is above 80.

## 2025-07-14 NOTE — PLAN OF CARE
[id number]    /61 (BP Location: Right arm)   Pulse 76   Temp 98.2  F (36.8  C) (Oral)   Resp 18   Ht 1.829 m (6')   Wt 103.6 kg (228 lb 6.3 oz)   SpO2 97%   BMI 30.98 kg/m       Neuro: Alert and Oriented x4  Cardiac: Apical and radial pulse regular   Respiratory: Room air   GI/: Independent bathroom usage   Diet/appetite: Regular diet   Activity:  Assist of one up with gait belt + walker   Pain: 10/10 pain reported in lower back-incisional, oxycodone given x1, atarax and roboxin given x1 pain- Lower buttock pain, x1 oxycodone   Skin:  heels  cracked and peeling   LDA's: x2 L PIV     Plan: Monitor

## 2025-07-14 NOTE — PLAN OF CARE
Status: Admitted 7/7, POD5 L3-5 fusion w/ pedicle screws, stay c/b pain and insulin gtt. PMHx of metastatic breast cancer, smoker, DVT, schizoaffective disorder, bipolar disorder and chronic pain.   Vitals: VSS on RA.   Neuros: A&Ox4. Lethargic at times - especially in AM. BUE 5, BLE 4.   IV: PIV SLx2.  Labs/Electrolytes: BG ACHS.   Resp: LSC - denies SOB.   Diet: Reg - CHO cov.   GI: LBM 7/13.  : VSP to BR.   Skin: Low back inci - ALEXI w/ previous hemovac site covered w/ primapore. Trace edema to R arm & R wrist d/t prev PIV infiltration.   Pain: 10/10 back pain - trx w/ sched & PRNs.   Activity: SBA/GB/FWW - mobility improving.   Social: Multiple calls via personal cellular device t/o nancy.   Plan: Con to monitor while follow POC.   Updates this shift: Daily routine maintained.   Education completed: Reviewed evening care expectations.     Goal Outcome Evaluation:      Plan of Care Reviewed With: patient    Overall Patient Progress: no change    Outcome Evaluation: Daily routine maintained.

## 2025-07-14 NOTE — PROGRESS NOTES
Inpatient Diabetes Management Service: Daily Progress Note     HPI:   49 year old female with multiple chronic comorbidites and recent L3-L5 spinal fusion, now with new onset severe hyperglycemia in the setting of recently diagnosed, not yet treated, diabetes mellitus (A1C 10.5%).                    Assessment/Plan:     Assessment:   DM2 with hyperglycemia and high insulin requirement  Stress hyperglycemia  Steroid hyperglycemia due to dexamethasone, resolved.         Plan/Recommendations:   --  insulin aspart 1 unit/4 grams of carb TID AC and snacks.-->  at 1530, further reduced to 1 per 5  - Continue insulin apart high dose correction scale 1 per 25 gram TID AC and HS.  --Continue  Lantus 60 units /day --->at 1530, reduced 50 units starting tomorrow  -- Diabetes education should be given on insulin administration and carb counting --> requesting nutrition consult (placed 7/14) on consistent carb diet, and updated educ on potential for home discharge (7/14).  Daughter (PCA) would be primary co-learner  -- timing of metformin start?, consideration of additional agents  -- Hypoglycemia protocol  -- Carb counting protocol     Discussion:  Hypoglycemia last evening.  Carb coverage dose reduced starting 0600 today, to 1 per 4 grams.  Carb intake is quite high.  Will benefit from continued educ/counseling on the carb impact on her diabetes    Discharge plan: TBD    - Phamracy laision consult completed. Insulin lantus and novolog flex pens covered with 0 co pay. Glucometer covered with zero co pay. Will likely go on basal bolus insulin regimen, plus metformin  - Pharmacy liaison consult placed for checking coverage for continuous glucose monitor--> may be eligible to receive a continuous glucose monitor covered by Medicare Part B and [place] Medicaid --> see criteria in /14 pharm liaison note  -- she would like to have diabetes follow up at the [facility]--> placed adult  endocrinology  order, for urgetn 1-2weeks.      Please notify Inpatient Diabetes Service if changes are planned to steroids, nutrition, TPN/TF and anticipated procedures requiring prolonged NPO status.         Interval History/Review of Systems :   The last 24 hours progress and nursing notes reviewed.    Feels like she is progressing well now and could go home with therapies rather than go to TCU.  Also reading up on diabetes management.  Discussed the foods on her tray and impact on BG.  She was NOT aware o the diabetes PTA.  Had NOT been aware metformin prescription was sent.    After the low glucose at supper time, prandial aspart was witheld.  This morning's bfast tray had 218 grams carb on it.  Says appetite today is good/normal but it was low yesterday.        Planned Procedures/Surgeries: L3-L5 fusion on 07/07/2025        Recent Labs   Lab 07/14/25  0838 07/14/25  0626 07/13/25  2211 07/13/25  1932 07/13/25  1826 07/13/25  1803   * 167* 197* 121* 89 70             Medications Impacting Glycemia:   Steroids:  On the day of surgery   D5W-containing solutions/medications: none   Other medications impacting glucose: Olanzapine         Nutrition:   Orders Placed This Encounter      Advance Diet as Tolerated: Regular Diet Adult    Supplements: None   TF: None  TPN: None         Diabetes History: see full consult note for complete diabetes history   Diabetes Type and Duration: recent diagnosis; but came to patient's awareness THIS admission  PTA Medication Regimen: none taken, though metformin had been prescribed  Historical Diabetes Medications: none  Outpatient Diabetes Provider: PCP  Formal Diabetes Education/Educator: none---> has nutrition referral for late june        Physical Exam:   /66 (BP Location: Right arm)   Pulse 69   Temp 97.8  F (36.6  C) (Oral)   Resp 16   Ht 1.829 m (6')   Wt 103.6 kg (228 lb 6.3 oz)   SpO2 98%   BMI 30.98 kg/m      Gen: Alert, in NAD   HEENT: NC/AT  hearing intact to conversational volume  Resp: Unlabored  Neuro: oriented x3, communicating clearly  Psych: bright mood  /66 (BP Location: Right arm)   Pulse 69   Temp 97.8  F (36.6  C) (Oral)   Resp 16   Ht 1.829 m (6')   Wt 103.6 kg (228 lb 6.3 oz)   SpO2 98%   BMI 30.98 kg/m               Data:     Lab Results   Component Value Date    A1C 10.5 (H) 07/07/2025    A1C 8.6 (H) 06/19/2025    A1C 6.1 (H) 03/04/2025    A1C 6.0 (H) 09/11/2024    A1C 6.4 (H) 01/05/2023       ROUTINE IP LABS (Last four results)  BMP  Recent Labs   Lab 07/14/25  0838 07/14/25  0626 07/13/25 2211 07/13/25 1932 07/13/25  0939 07/13/25 0610 07/12/25  0917 07/12/25  0719 07/11/25  0737 07/11/25  0647   NA  --  137  --   --   --  139  --  139  --  137   POTASSIUM  --  4.3  --   --   --  4.4  --  4.0  --  4.1   CHLORIDE  --  102  --   --   --  102  --  101  --  100   NANDO  --  9.5  --   --   --  9.5  --  9.2  --  9.4   CO2  --  26  --   --   --  27  --  28  --  27   BUN  --  9.7  --   --   --  11.3  --  10.8  --  9.0   CR  --  0.70  --   --   --  0.69  --  0.67  --  0.65   * 167* 197* 121*   < > 122*   < > 173*   < > 175*    < > = values in this interval not displayed.       eGFR >90  CBC  Recent Labs   Lab 07/14/25 0626 07/13/25 0610 07/12/25 0719 07/11/25  0647   WBC 5.6 4.9 4.9 5.2   RBC 2.96* 3.17* 2.94* 3.16*   HGB 8.0* 8.4* 7.8* 8.4*   HCT 26.3* 27.5* 25.2* 27.2*   MCV 89 87 86 86   MCH 27.0 26.5 26.5 26.6   MCHC 30.4* 30.5* 31.0* 30.9*   RDW 15.2* 15.0 15.0 14.7    226 213 179     INRNo lab results found in last 7 days.    Nesha Koenig, [facility] [facility]        To contact Inpatient Diabetes Service:     7 AM - 5 PM: Page the [employer] following the patient that day (see filed or incomplete progress notes/consult notes under Endocrinology)    OR if uncertain of provider assignment: page job code [phone]    5 PM - 7 AM: First call after hours is to primary service.    For urgent after-hours questions, page job code  for on call fellow: [phone]     50 total min spent in this encounter:  including min face-to-face with the patient (and family) in examination, interview, education and counseling; the remainder spent in chart review, coordination and documentation on the floor.

## 2025-07-14 NOTE — PLAN OF CARE
Vitals: VSS on RA  Neuros: A&O x4, strengths 5/5, denied N/T  IV: PIVs SL  Labs/Electrolytes: WDL   Resp: LSC on RA  Diet: Reg, carb coverage, tolerating  GI: No BM today, regimen given  : Voiding spontaneously  Skin: Back  incision is WDL and ALEXI, old hemovac site is covered w/ primapore and CDI  Pain: Complained of 10/10 back pain throughout the day, PRN meds given w/ minimal-some relief  Activity: Ax1 w/ GB and walker  SCDs on?: Refusing  Social: Calm, cooperative, pleasant  Plan: Therapies have changed their recs to home w/ home PT, pending DME and BM  Updates this shift: Therapies updated their recs        Goal Outcome Evaluation:      Plan of Care Reviewed With: patient    Overall Patient Progress: no changeOverall Patient Progress: no change    Outcome Evaluation: Continue with pain management, continue to encourage OOB and working with therapies

## 2025-07-14 NOTE — PROGRESS NOTES
Care Management Follow Up    Length of Stay (days): 7    Expected Discharge Date: 07/15/2025     Concerns to be Addressed: Discharge planning     Patient plan of care discussed at interdisciplinary rounds: Yes    Anticipated Discharge Disposition:   On 7/14/2025,  Physical  and Occupational Therapy changed their discharge recommendation from TCU placement to home with home Physical Therapy.                Anticipated Discharge Services:   On 7/14/2025,  Physical  and Occupational Therapy changed their discharge recommendation from TCU placement to home with home Physical Therapy.    Anticipated Discharge DME:   Not applicable at this time    Patient/family educated on Medicare website which has current facility and service quality ratings:   Yes  Education Provided on the Discharge Plan: Yes  Patient/Family in Agreement with the Plan: Yes    Referrals Placed by CM/SW:  SW did not make any referrals as OT and Physical Therapy have changed their recommendation from TCU to home  Private pay costs discussed:   Not applicable at this time    Discussed  Partnership in Safe Discharge Planning  document with patient/family: No     Handoff Completed: No, handoff not indicated or clinically appropriate    Additional Information:  SW following pt for discharge planning.  SW Updated by OT and Physical Therapy that they are now changing their recommendation from TCU to home with home Physical Therapy.    SW:  - placed a note on 6A RNCC informing of the change in discharge recommendation  - SVETA updated Dr. Glil Bolden  - SVETA mailed pt's Handicapped Parking Certificate application  - SVETA contacted [facility] to confirm the name and contact phone number of pt's CADI .  Per [facility], pt's CADI  is Digna Cummings at [phone].  SVETA phoned Digna and left a voice mail updating in regards to the discharge recommendation.   SVETA informed Digna that pt would like to receive a RTS (with arms)  through the  CADI Waiver Program and to be re-assessed post hospitalization for increased PCA hours.    - SW provided pt with Health Care Directive form.  Pt voiced awareness that OT and Physical Therapy changed rec to home and pt voices agreement with this recommendation.  Pt states that the therapist told her they will give her a RTS with arm's to take home with her.    - SVETA returned a call to Serenity DAWKINS ([phone][facility] SW informed Serenity that OT and Physical Therapy changed their recommendation to home.        Next Steps:  No further SW intervention  planned at this time as discharge to home is now recommended and will be coordinated by the 6A RNCC.  SW available should add'l needs arise.    DANNY Ledbetter  Social Work, 6A  Phone:  [phone]  Pager:  [phone]  7/14/2025       OSCAR Taylor

## 2025-07-14 NOTE — DISCHARGE SUMMARY
[facility] Discharge Summary and Instructions      Teresa Sanderson MRN# [id number]   Age: 49 year old YOB: 1975     Date of Admission:  7/7/2025  Date of Discharge::  7/15/2025  Admitting Physician:  Modesta Goodrich MD  Discharge Physician:  Modesta Goodrich MD          Admission Diagnoses:   Pathological fracture of vertebra due to malignant neoplasm metastatic to bone (H) [M84.58XA, C79.51]  Other closed fracture of fourth lumbar vertebra with delayed healing, subsequent encounter [S32.048G]          Discharge Diagnosis:     Pathological fracture of vertebra due to malignant neoplasm metastatic to bone (H) [M84.58XA, C79.51]  Other closed fracture of fourth lumbar vertebra with delayed healing, subsequent encounter [S32.048G]     Clinically Significant Risk Factors Present on Admission               # Hypoalbuminemia: Lowest albumin = 3.4 g/dL at 7/10/2025 12:57 PM, will monitor as appropriate                   # DMII: A1C = 10.5 % (Ref range: <5.7 %) within past 6 months       # Obesity: Estimated body mass index is 30.98 kg/m  as calculated from the following:    Height as of this encounter: 1.829 m (6').    Weight as of this encounter: 103.6 kg (228 lb 6.3 oz).          # Financial/Environmental Concerns: none                                                 ,  ,  ,  ,  ,         Procedures:   o-arm/stealth assisted Lumbar 3-5 fusion with pedicle screws (7/7/2025 with Dr. Goodrich)           Brief History of Illness:   Previously had radiation from L3-5 and kyphoplasty at L4. However, continues to have pain and recently found to have pedicle fractures bilaterally on L4. Bilateral pedicle involvement and slight increase in slippage (although seen in MRI and CT) are concerning for possible instability. Remains full strength without sensory deficits on examination. MRI thoracic and lumbar spine was unremarkable.      Risks including but not limited to bleeding, infection, CSF leak, new  neurological deficits, benefits and alternatives of the procedure were discussed and patient agreed to proceed with surgical intervention.  Patient has elected to undergo above-mentioned procedure.           Hospital Course:   Patient underwent above-mentioned procedure on 7/7/2025.  Following the procedure the patient was transferred to the floor.  The patient's course was complicated by elevated glucoses requiring endocrinology consult and initiation of type 2 DM medication regimen, as well as a rapid response called 7/10/2025 for transient decreased LOC, with immediate return to baseline when patient stimulated.  Patient underwent post operative upright x-ray of complete spine which demonstrated expected post-operative changes from L3-5 posterior fusion. On post operative day 8, she was ambulating, voiding without a torres, eating a regular diet, pain was well controlled and therefore she was deemed medically ready for discharge.    Plan to restart Xarelto on POD 14 (7/21/2025)    Okay to resume chemotherapy on POD 14 (7/21/2025)      Glucose Control Regimen:      1) Long-acting insulin: glargine (Lantus) - take 50 units once daily at 10 am. Take at same time each day.     2) Rapid-acting insulin: aspart (Novolog) carbohydrate coverage/mealtime insulin -      Take 30 units before Large meals that contain carbohydrates (150-200 grams of carbs)  Take 20 units before average meals or snacks that contain carbohydrates (100-120 grams of carbs)   Take 10 units before small meals that contain carbohydrates (50-70 grams of carbs)     4) Rapid-acting insulin: aspart (Novolog) correction - see chart below. Take for high blood glucoses three times daily before meals and at bedtime.   You may add the correction dose to the carbohydrate coverage/mealtime insulin dose and give in one injection--ideally 10-15 minutes before a meal.  You may take the correction dose even if you skip a meal (as long as it has been 4 hours since  previous correction dose).      Pre-meal correction scale:     Blood Glucose Insulin Novolog Before Meals:   Less than 140 0 units   140 -164  1 units   165 -189 2 units   190 - 214 3 units   215 - 239 4 units    240 - 264  5 units   265 - 289 6 units   290 - 314 7 units   315 - 339 8 units   340 - 364 9 units   365 or more 10 units         Bedtime correction scale:      Blood Glucose Insulin Novolog At Bedtime:   Less than 200 0 units   200 - 224 1 units   225 - 249 2 units   250 - 274 3 units   275 - 299  4 units    300 - 324  5 units   325 - 349 6 units   350 or more 7 units            Outpatient Diabetes Follow-Up: Follow up with your Primary Care Provider (PCP) in 1-2 weeks, bring glucose data to your appointment. An appointment request was sent to the [facility] coordinator to schedule your outpatient diabetes appointment 1-2 weeks from discharge. You can call the [facility] at [phone] if you have scheduling questions or do not hear from them within a few days of discharge. Follow up sooner if blood glucose runs consistently greater than 200 mg/dL or if having more than two episodes less than 70 mg/dL.     If you have urgent questions or concerns regarding your blood sugars or insulin, you may contact [phone] (the main hospital ). Ask to speak with the Endocrinologist on call.     Your target A1c value is less than 7% to help prevent future complications from diabetes. Your most recent A1c is 10.5%.           Discharge Medications:     Current Discharge Medication List        CONTINUE these medications which have NOT CHANGED    Details   Buprenorphine HCl (BELBUCA) 600 MCG FILM buccal film Place 1 Film (600 mcg) inside cheek every 12 hours.  Qty: 60 Film, Refills: 0    Associated Diagnoses: Malignant neoplasm of overlapping sites of left breast in female, estrogen receptor positive (H); Closed fracture of pedicle of lumbar vertebra, initial encounter (H);  Cancer associated pain; Metastasis to bone (H)      cyclobenzaprine (FLEXERIL) 5 MG tablet Take 1 tablet (5 mg) by mouth 3 times daily.  Qty: 45 tablet, Refills: 0    Associated Diagnoses: Uncontrolled pain      everolimus (AFINITOR) 10 MG tablet Take 1 tablet (10 mg) by mouth daily for 28 days Avoid grapefruit and grapefruit juice. Take with or without food, but should be consistent.  Qty: 28 tablet, Refills: 0    Associated Diagnoses: Carcinoma of left breast metastatic to bone (H); Malignant neoplasm of overlapping sites of left breast in female, estrogen receptor positive (H)      fulvestrant (FASLODEX) 250 MG/5ML injection Inject 500 mg into the muscle every 28 days.      gabapentin (NEURONTIN) 300 MG capsule Take 1 capsule (300 mg) by mouth 3 times daily.  Qty: 90 capsule, Refills: 0    Associated Diagnoses: Malignant neoplasm metastatic to bone (H)      hydrOXYzine HCl (ATARAX) 25 MG tablet Take 1 tablet (25 mg) by mouth 4 times daily as needed for anxiety or other (panic).  Qty: 120 tablet, Refills: 2    Associated Diagnoses: Schizoaffective disorder, bipolar type (H)      OLANZapine (ZYPREXA) 5 MG tablet Take 1-2 tablets (5-10 mg) by mouth nightly as needed for sleep. Take 1/2 tablet (2.5mg) daily as needed for hypomania/ tom.  Qty: 30 tablet, Refills: 2    Associated Diagnoses: Schizoaffective disorder, bipolar type (H)      prochlorperazine (COMPAZINE) 10 MG tablet Take 1 tablet (10 mg) by mouth every 6 hours as needed for nausea or vomiting.  Qty: 30 tablet, Refills: 2    Associated Diagnoses: Carcinoma of left breast metastatic to bone (H); Malignant neoplasm of overlapping sites of left breast in female, estrogen receptor positive (H)      !! QUEtiapine (SEROQUEL) 100 MG tablet Tale 1 tablet (100mg) with 1 tablet (400mg) for total of 500mg by mouth at bedtime  Qty: 30 tablet, Refills: 2    Associated Diagnoses: Schizoaffective disorder, bipolar type (H)      !! QUEtiapine (SEROQUEL) 400 MG tablet  Take 1 tablet (400mg) with 1 tablet (100mg) for total of 500mg by mouth at bedtime  Qty: 30 tablet, Refills: 2    Associated Diagnoses: Schizoaffective disorder, bipolar type (H)      !! QUEtiapine (SEROQUEL) 50 MG tablet Take 0.5-1 tablets (25-50 mg) by mouth 2 times daily as needed (for sleep, mood and anxiety).  Qty: 60 tablet, Refills: 2    Associated Diagnoses: Schizoaffective disorder, bipolar type (H)      rivaroxaban ANTICOAGULANT (XARELTO) 20 MG TABS tablet Take 1 tablet (20 mg) by mouth daily (with dinner).  Qty: 90 tablet, Refills: 3    Associated Diagnoses: Acute deep vein thrombosis (DVT) of popliteal vein of left lower extremity (H)      sertraline (ZOLOFT) 100 MG tablet Take 1 tablet (100 mg) by mouth daily.  Qty: 30 tablet, Refills: 3    Associated Diagnoses: Malignant neoplasm metastatic to bone (H)      Vitamin D3 (CHOLECALCIFEROL) 25 mcg (1000 units) tablet Take 1 tablet (25 mcg) by mouth daily.  Qty: 90 tablet, Refills: 3    Associated Diagnoses: Malignant neoplasm of overlapping sites of left breast in female, estrogen receptor positive (H)      blood glucose (NO BRAND SPECIFIED) lancets standard Use to test blood sugar 2 times daily or as directed.  Qty: 100 each, Refills: 1    Associated Diagnoses: Hyperglycemia      blood glucose (NO BRAND SPECIFIED) test strip Use to test blood sugar 2 times daily or as directed.  Qty: 100 strip, Refills: 1    Associated Diagnoses: Hyperglycemia      blood glucose monitoring (NO BRAND SPECIFIED) meter device kit Use to test blood sugar 2 times daily or as directed.  Qty: 1 kit, Refills: 0    Associated Diagnoses: Hyperglycemia      magic mouthwash suspension (diphenhydrAMINE, lidocaine, aluminum-magnesium & simethicone) Swish and swallow 10 mLs in mouth every 6 hours as needed for mouth sores.  Qty: 120 mL, Refills: 1    Associated Diagnoses: Mouth sores      metFORMIN (GLUCOPHAGE) 500 MG tablet Take 1 tablet (500 mg) by mouth 2 times daily (with  meals).  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 every 2-3 minutes until assistance arrives  Qty: 2 each, Refills: 0    Associated Diagnoses: Cancer associated pain      nicotine (NICODERM CQ) 14 MG/24HR 24 hr patch Place 1 patch over 24 hours onto the skin every 24 hours.  Qty: 30 patch, Refills: 0    Associated Diagnoses: Encounter for smoking cessation counseling      oxyCODONE IR (ROXICODONE) 10 MG tablet Take 1 tablet (10 mg) by mouth every 4 hours as needed for severe pain.  Qty: 150 tablet, Refills: 0    Associated Diagnoses: Malignant neoplasm of overlapping sites of left breast in female, estrogen receptor positive (H); Closed fracture of pedicle of lumbar vertebra, initial encounter (H); Cancer associated pain; Metastasis to bone (H)       !! - Potential duplicate medications found. Please discuss with provider.                 Exam:   Physical Exam  /66 (BP Location: Right arm)   Pulse 69   Temp 97.8  F (36.6  C) (Oral)   Resp 16   Ht 1.829 m (6')   Wt 103.6 kg (228 lb 6.3 oz)   SpO2 98%   BMI 30.98 kg/m    Gen: Appears comfortable, NAD  Wound: Incision, clean, dry, intact without strikethrough  Neurologic:  - Alert & Oriented to person, place, time, and situation  - Follows commands briskly  - Speech fluent, spontaneous. No aphasia or dysarthria.  - No gaze preference. No apparent hemineglect.  - PERRL, EOMI  - Strong eye closure, jaw clench, and cheek puff  - Face symmetric with sensation intact to light touch  - Palate elevates symmetrically, uvula midline, tongue protrudes midline  - Trapezii and sternocleidomastoid muscles 5/5 bilaterally  - No pronator drift       Del Tr Bi WE WF Gr   R 5 5 5 5 5 5   L 5 5 5 5 5 5     HF KE KF DF PF EHL    R 5 5 5 5 5 5   L 5 5 5 5 5 5      Reflexes 2+ throughout     Sensation intact and symmetric to light touch throughout         Discharge Instructions and Follow-Up:     Discharge diet: Regular   Discharge activity: You may advance activity as tolerated. No strenuous exercise or heay lifting greater than 10 lbs for 4 weeks or until seen and cleared in clinic.      Discharge follow-up: Follow-up with Neurosurgery ALEKSANDRA in 2 weeks for wound check/post-hospital evaluation.    All additional follow-up visits to be determined by Dr. Modesta Goodrich MD       Wound care: Ok to shower, however no scrubbing of the wound and no soaking of the wound, meaning no bathtubs or swimming pools. Pat dry only. Leave wound open to air.  Patient to have wound checked 2 weeks following surgery.    Wound location: lumbar back  Closure technique: nylon suture  Dressing needs: may leave open to air  Post-op care at follow-up: Keep dry and clean       Please call if you have:  1. increased pain, redness, drainage, swelling at your incision  2. fevers > 101.5 F degrees  3. with any questions or concerns.  You may reach the Neurosurgery clinic at [phone] during regular work hours. ER at [phone].    and ask for the Neurosurgery Resident on call at [phone], during off hours or weekends.         Discharge Disposition:     Discharged to home        Gill Bolden MD  Neurosurgery PGY-2  Personal pager #[phone]  Please page #[phone] (urgent)/VOCERA (non-urgent) the on-call neurosurgery resident per [facility] with questions    I have seen this patient with the resident and formulated a plan and agree with this note.  AMP

## 2025-07-14 NOTE — CONSULTS
"SPIRITUAL HEALTH SERVICES Consult Note  ([place]) 6A  Referral Source/Reason for Visit: Routine Consult   Summary and Recommendations -    Teresa voiced some emotional and spiritual distress today around her illness and her son's death a couple years ago.   Eder's Quaker bassam is important to her. As we discussed her distress and grief, Eder voiced her belief that \"God is with me,\" and \"I will be okay.\"  I offered to pray for Eder and she welcomed it.    PLAN: Eder has requested a printout of Bible verses which I will bring to her this afternoon. Spiritual Health Services remain available on request. Please place a standard consult order on Epic.    Jahaira Frazier  Chaplain Resident    Spiritual Health Services is available 24/7 for emergent requests and consults, either by paging the on-call  or by entering an ASAP/STAT consult in [employer], which will also page the on-call .    Assessment    Saw pt Teresa Kin \"Alcon-malgorzata\"    Patient/Family Understanding of Illness and Goals of Care -   Teresa spoke about having surgery for her cancer and learning that she has diabetes as well. She expects to go home soon.    Distress and Loss -   Teresa named strong feelings of fear and anxiety about her health. When she is sleeping she has had the reoccuring experience of feeling like she is falling off a tall building when staff come into wake her up. She said it feels \"terrifying\" and it takes her time to wake up and realize it didn't happen.  Teresa identified grief regarding her illness as well as the death of her son a couple years ago. She shared that she often cries at different times throughout the day.   Teresa has some spiritual distress and feels like God is the one who has given her even more to handle with the diabetes diagnosis. She feels like she has been distant from God and has felt the pain of that.    Strengths, Coping, and Resources -   Teresa has the support " "of her son and daughter.  She finds hope and encouragement through her bassam. As we conversed and spoke about her Evangelical beliefs and Scripture she voiced that, \"God is with me,\" and \"I will be okay.\"  Today Teresa was able to walk and use the stairs. She noticed that she felt \"powerful\" doing it and rejoiced that she has physical strength.  Teresa has requested that I print our some encouraging Bible verses that she can put up on her refrigerator to remind her to hope.    Meaning, Beliefs, and Spirituality -   Teresa's bassam is very important to her. She values \"studying Scripture, praying,\" and \"being kind to people.\"  Teresa said \"I want to go to [place],\" but it's been challenging for her to find a local [place] because she doesn't have transportation available.  I offered to pray for Teresa and she welcomed it.  "

## 2025-07-14 NOTE — PROGRESS NOTES
[facility], [place]   Neurosurgery Progress Note:    Date of service: 7/13/2025    Interval History:  Ongoing incisional pain, left buttock pain, no leg pain.    Voiding, states has not had normal BM.    Requires OBRA Level 2 before discharge, pending    Assessment: Teresa Sanderson is a 49 year old female s/p o-arm/stealth assisted Lumbar 3-5 fusion with pedicle screws on 7/7/2025 with Dr. Goodrich.    Clinically Significant Risk Factors Present on Admission            # Hypoalbuminemia: Lowest albumin = 3.4 g/dL at 7/10/2025 12:57 PM, will monitor as appropriate               # DMII: A1C = 10.5 % (Ref range: <5.7 %) within past 6 months   # Obesity: Estimated body mass index is 30.98 kg/m  as calculated from the following:    Height as of this encounter: 1.829 m (6').    Weight as of this encounter: 103.6 kg (228 lb 6.3 oz).         Plan:  - Neuro checks/vital signs: Every 4 hours  - Pain control: PRN PO Oxycodone and atarax  - Activity: up as tolerated - encourage ambulation and sitting up in the chair  - Restrictions/Bracing: none  - Diet: Regular  - Anti-coagulation: HOLD Xarelto until POD 14  - Ok to start chemotherapy on POD 14   - DVT prophylaxis: SCDs & subcutaneous heparin   - Imaging:  UXR completed 7/10  - Pain Team Consult - appreciate recommendations   - Endocrinology Diabetes Consult - appreciate recommendations   - Maintain Bowel regimen  - PT/OT: recommending TCU, pending OBRA II eval    Staff: Dr. Goodrich    -----------------------------------------------------------------------------------  RENO Lua, CNP  Department of Neurosurgery  Pager: [phone]      Please contact neurosurgery resident on call with questions.    Dial * * *[phone], enter [phone] when prompted.   -----------------------------------------------------------------------------------  Objective:   Temp:  [97.8  F (36.6  C)-98.3  F (36.8  C)] 97.8  F (36.6  C)  Pulse:  [69-76] 69  Resp:  [16-18]  16  BP: (103-104)/(61-72) 104/66  SpO2:  [93 %-98 %] 98 %  I/O last 3 completed shifts:  In: 1336 [P.O.:1336]  Out: 1215 [Urine:1215]    Gen: Appears comfortable, NAD  Wound: Incision, clean, dry, intact without strikethrough  Neurologic:  - Alert & Oriented to person, place, time, and situation  - Follows commands briskly  - Speech fluent, spontaneous. No aphasia or dysarthria.  - No gaze preference. No apparent hemineglect.  - PERRL, EOMI  - Strong eye closure, jaw clench, and cheek puff  - Face symmetric with sensation intact to light touch  - Palate elevates symmetrically, uvula midline, tongue protrudes midline  - Trapezii and sternocleidomastoid muscles 5/5 bilaterally  - No pronator drift     Del Tr Bi WE WF Gr   R 5 5 5 5 5 5   L 5 5 5 5 5 5    HF KE KF DF PF EHL   R 5 5 5 5 5 5   L 5 5 5 5 5 5     Reflexes 2+ throughout    Sensation intact and symmetric to light touch throughout    LABS  Recent Labs   Lab Test 07/14/25  0626 07/13/25  0610 07/12/25  0719   WBC 5.6 4.9 4.9   HGB 8.0* 8.4* 7.8*   MCV 89 87 86    226 213       Recent Labs   Lab Test 07/14/25  0838 07/14/25  0626 07/13/25  2211 07/13/25  0939 07/13/25  0610 07/12/25  0917 07/12/25  0719   NA  --  137  --   --  139  --  139   POTASSIUM  --  4.3  --   --  4.4  --  4.0   CHLORIDE  --  102  --   --  102  --  101   CO2  --  26  --   --  27  --  28   BUN  --  9.7  --   --  11.3  --  10.8   CR  --  0.70  --   --  0.69  --  0.67   ANIONGAP  --  9  --   --  10  --  10   NANDO  --  9.5  --   --  9.5  --  9.2   * 167* 197*   < > 122*   < > 173*    < > = values in this interval not displayed.

## 2025-07-14 NOTE — CONSULTS
Discharge Pharmacy Test Claim    Patient may be eligible to receive a continuous glucose monitor covered by Medicare Part B and [place] Medicaid secondary provided that patient meets CGM Medicare criteria.     CGM Criteria  The patient has a diagnosis of diabetes mellitus.  The patient has been using a blood glucose monitor to test 4+ times a day.  The patient is insulin-treated with three or more daily injections of insulin (basal and rapid acting).  The patient's insulin treatment regimen requires frequent adjustments due sliding scale, carb counting, and/or labile blood sugars.    Liz Ponce  [facility] Pharmacy Liaison, [place]  Ph: [phone]  Fax: [phone]  Available on Teams and Vocera  Disclaimer: Pharmacy test claims are estimates and may not reflect final costs. Suggested alternatives aim to be cost-effective and may not be therapeutically equivalent. This consult is informational and does not constitute medical advice. Clinical decisions should be made by qualified healthcare providers.

## 2025-07-14 NOTE — PROGRESS NOTES
Care Management Follow Up    Length of Stay (days): 7    Expected Discharge Date: 07/15/2025     Concerns to be Addressed: discharge planning     Patient plan of care discussed at interdisciplinary rounds: Yes    Anticipated Discharge Disposition: Home  Anticipated Discharge Services:  Home care  Anticipated Discharge DME: raised toilet seat and commode to be issued by therapy     Patient/family educated on Medicare website which has current facility and service quality ratings: Yes  Education Provided on the Discharge Plan: Yes  Patient/Family in Agreement with the Plan: Yes    Referrals Placed by CM/SW:    Private pay costs discussed: Not applicable    Discussed  Partnership in Safe Discharge Planning  document with patient/family: Not at this time     Handoff Completed: No, handoff not indicated or clinically appropriate at this time    Additional Information:  Patient status reviewed, discussed in discharge rounds. Per provider, patient is not yet ready for discharge.     Updated by PT this morning that they are changing their recommendation from TCU to home with home health. OT to see this afternoon. PT states patient will need a raised toilet seat and a commode for discharge which they can issue to the patient at discharge.     Met with patient to discuss discharge planning. She agrees she will be able to safely discharge home but agrees to wait OT recommendations. She is open to receiving home care services and does not have a preference on home care agency. She is currently on a CADI waiver but does not recall the name and number for her CADI CM. She also does not have her MA information. She called her PCA agency and was able to obtain her MA number - [phone], as well as CADI CM info: Camilla Hernández ph: [phone].     Referral for home RN/PT/OT sent to [facility] at this time, they are able to accept.     Call placed to registration and provided MA number to update patient's face sheet.    Call placed to CADI  BLAZE Hernández to discuss discharge planning. She did not answer so a voicemail was left requesting a call back.     Next Steps:   RNCC will continue to follow  - Keep A[facility]V updated on discharge   - Follow up on OT recs to verify patient is safe for home discharge  - Follow up with DUANE THAKUR to discuss discharge plan    [facility] - accepted for home RN/PT/OT  Ph: [phone]  Fax: [phone]      DUANE THAKUR - Camilla Hernández  Ph: [phone]    Zahra Jimenez, RN, BSN  6A Nurse Care Coordinator  [facility] Acute Care Management  Phone: [phone]   Vocera: 6A Neuro RNCC   none

## 2025-07-15 ENCOUNTER — APPOINTMENT (OUTPATIENT)
Dept: PHYSICAL THERAPY | Facility: CLINIC | Age: 50
DRG: 457 | End: 2025-07-15
Attending: NEUROLOGICAL SURGERY
Payer: MEDICARE

## 2025-07-15 ENCOUNTER — TELEPHONE (OUTPATIENT)
Dept: INTERNAL MEDICINE | Facility: CLINIC | Age: 50
End: 2025-07-15
Payer: MEDICARE

## 2025-07-15 VITALS
WEIGHT: 243.2 LBS | BODY MASS INDEX: 32.94 KG/M2 | HEIGHT: 72 IN | TEMPERATURE: 98.1 F | DIASTOLIC BLOOD PRESSURE: 67 MMHG | OXYGEN SATURATION: 97 % | SYSTOLIC BLOOD PRESSURE: 124 MMHG | HEART RATE: 69 BPM | RESPIRATION RATE: 18 BRPM

## 2025-07-15 LAB
ANION GAP SERPL CALCULATED.3IONS-SCNC: 12 MMOL/L (ref 7–15)
BUN SERPL-MCNC: 8.3 MG/DL (ref 6–20)
CALCIUM SERPL-MCNC: 10 MG/DL (ref 8.8–10.4)
CHLORIDE SERPL-SCNC: 101 MMOL/L (ref 98–107)
CREAT SERPL-MCNC: 0.72 MG/DL (ref 0.51–0.95)
EGFRCR SERPLBLD CKD-EPI 2021: >90 ML/MIN/1.73M2
ERYTHROCYTE [DISTWIDTH] IN BLOOD BY AUTOMATED COUNT: 15.5 % (ref 10–15)
GLUCOSE BLDC GLUCOMTR-MCNC: 152 MG/DL (ref 70–99)
GLUCOSE BLDC GLUCOMTR-MCNC: 99 MG/DL (ref 70–99)
GLUCOSE SERPL-MCNC: 110 MG/DL (ref 70–99)
HCO3 SERPL-SCNC: 26 MMOL/L (ref 22–29)
HCT VFR BLD AUTO: 27.2 % (ref 35–47)
HGB BLD-MCNC: 8.2 G/DL (ref 11.7–15.7)
MAGNESIUM SERPL-MCNC: 1.9 MG/DL (ref 1.7–2.3)
MCH RBC QN AUTO: 26.4 PG (ref 26.5–33)
MCHC RBC AUTO-ENTMCNC: 30.1 G/DL (ref 31.5–36.5)
MCV RBC AUTO: 88 FL (ref 78–100)
PHOSPHATE SERPL-MCNC: 5.4 MG/DL (ref 2.5–4.5)
PLATELET # BLD AUTO: 265 10E3/UL (ref 150–450)
POTASSIUM SERPL-SCNC: 4.6 MMOL/L (ref 3.4–5.3)
RBC # BLD AUTO: 3.11 10E6/UL (ref 3.8–5.2)
SODIUM SERPL-SCNC: 139 MMOL/L (ref 135–145)
WBC # BLD AUTO: 5.8 10E3/UL (ref 4–11)

## 2025-07-15 PROCEDURE — 97116 GAIT TRAINING THERAPY: CPT | Mod: GP | Performed by: PHYSICAL THERAPIST

## 2025-07-15 PROCEDURE — 84100 ASSAY OF PHOSPHORUS: CPT

## 2025-07-15 PROCEDURE — 250N000013 HC RX MED GY IP 250 OP 250 PS 637: Performed by: NURSE PRACTITIONER

## 2025-07-15 PROCEDURE — 97530 THERAPEUTIC ACTIVITIES: CPT | Mod: GP | Performed by: PHYSICAL THERAPIST

## 2025-07-15 PROCEDURE — 83735 ASSAY OF MAGNESIUM: CPT

## 2025-07-15 PROCEDURE — 250N000013 HC RX MED GY IP 250 OP 250 PS 637: Performed by: NEUROLOGICAL SURGERY

## 2025-07-15 PROCEDURE — 250N000012 HC RX MED GY IP 250 OP 636 PS 637

## 2025-07-15 PROCEDURE — 250N000011 HC RX IP 250 OP 636: Performed by: NURSE PRACTITIONER

## 2025-07-15 PROCEDURE — 36415 COLL VENOUS BLD VENIPUNCTURE: CPT

## 2025-07-15 PROCEDURE — 82435 ASSAY OF BLOOD CHLORIDE: CPT

## 2025-07-15 PROCEDURE — 85027 COMPLETE CBC AUTOMATED: CPT

## 2025-07-15 RX ORDER — MAGNESIUM OXIDE 400 MG/1
400 TABLET ORAL EVERY 4 HOURS
Status: COMPLETED | OUTPATIENT
Start: 2025-07-15 | End: 2025-07-15

## 2025-07-15 RX ORDER — FAMOTIDINE 20 MG/1
20 TABLET, FILM COATED ORAL 2 TIMES DAILY
Qty: 60 TABLET | Refills: 0 | Status: SHIPPED | OUTPATIENT
Start: 2025-07-15

## 2025-07-15 RX ORDER — HYDROXYZINE HYDROCHLORIDE 50 MG/1
50 TABLET, FILM COATED ORAL 4 TIMES DAILY PRN
Qty: 30 TABLET | Refills: 0 | Status: SHIPPED | OUTPATIENT
Start: 2025-07-15 | End: 2025-07-28

## 2025-07-15 RX ORDER — METHOCARBAMOL 500 MG/1
250 TABLET, FILM COATED ORAL 4 TIMES DAILY PRN
Qty: 20 TABLET | Refills: 0 | Status: SHIPPED | OUTPATIENT
Start: 2025-07-15 | End: 2025-07-28

## 2025-07-15 RX ORDER — GABAPENTIN 100 MG/1
100 CAPSULE ORAL 2 TIMES DAILY
Qty: 120 CAPSULE | Refills: 0 | Status: ACTIVE | OUTPATIENT
Start: 2025-07-15

## 2025-07-15 RX ORDER — AMOXICILLIN 250 MG
2 CAPSULE ORAL 2 TIMES DAILY
Qty: 20 TABLET | Refills: 0 | Status: SHIPPED | OUTPATIENT
Start: 2025-07-15

## 2025-07-15 RX ORDER — BLOOD-GLUCOSE METER
KIT MISCELLANEOUS
Qty: 1 KIT | Refills: 0 | Status: SHIPPED | OUTPATIENT
Start: 2025-07-15

## 2025-07-15 RX ORDER — GABAPENTIN 300 MG/1
300 CAPSULE ORAL AT BEDTIME
Qty: 60 CAPSULE | Refills: 0 | Status: ACTIVE | OUTPATIENT
Start: 2025-07-15

## 2025-07-15 RX ORDER — LIDOCAINE 4 G/G
2 PATCH TOPICAL EVERY 24 HOURS
Qty: 30 PATCH | Refills: 0 | Status: SHIPPED | OUTPATIENT
Start: 2025-07-15

## 2025-07-15 RX ORDER — QUETIAPINE FUMARATE 400 MG/1
400 TABLET, FILM COATED ORAL AT BEDTIME
Qty: 30 TABLET | Refills: 0 | Status: SHIPPED | OUTPATIENT
Start: 2025-07-15

## 2025-07-15 RX ORDER — BISACODYL 10 MG
10 SUPPOSITORY, RECTAL RECTAL DAILY PRN
Qty: 4 SUPPOSITORY | Refills: 0 | Status: SHIPPED | OUTPATIENT
Start: 2025-07-15

## 2025-07-15 RX ORDER — ACETAMINOPHEN 325 MG/1
975 TABLET ORAL EVERY 8 HOURS
Qty: 30 TABLET | Refills: 0 | Status: SHIPPED | OUTPATIENT
Start: 2025-07-15 | End: 2025-07-28

## 2025-07-15 RX ORDER — SERTRALINE HYDROCHLORIDE 100 MG/1
100 TABLET, FILM COATED ORAL EVERY MORNING
Qty: 30 TABLET | Refills: 0 | Status: SHIPPED | OUTPATIENT
Start: 2025-07-15

## 2025-07-15 RX ORDER — POLYETHYLENE GLYCOL 3350 17 G/17G
17 POWDER, FOR SOLUTION ORAL DAILY
Qty: 850 G | Refills: 0 | Status: SHIPPED | OUTPATIENT
Start: 2025-07-15

## 2025-07-15 RX ORDER — OXYCODONE HYDROCHLORIDE 5 MG/1
5 TABLET ORAL EVERY 4 HOURS PRN
Qty: 20 TABLET | Refills: 0 | Status: SHIPPED | OUTPATIENT
Start: 2025-07-15 | End: 2025-07-28

## 2025-07-15 RX ORDER — ONDANSETRON 4 MG/1
4 TABLET, ORALLY DISINTEGRATING ORAL EVERY 6 HOURS PRN
Qty: 30 TABLET | Refills: 0 | Status: SHIPPED | OUTPATIENT
Start: 2025-07-15

## 2025-07-15 RX ORDER — INSULIN LISPRO 100 [IU]/ML
30 INJECTION, SOLUTION INTRAVENOUS; SUBCUTANEOUS
Qty: 81 ML | Refills: 0 | Status: SHIPPED | OUTPATIENT
Start: 2025-07-15

## 2025-07-15 RX ORDER — MAGNESIUM SULFATE HEPTAHYDRATE 40 MG/ML
2 INJECTION, SOLUTION INTRAVENOUS ONCE
Status: DISCONTINUED | OUTPATIENT
Start: 2025-07-15 | End: 2025-07-15

## 2025-07-15 RX ORDER — OLANZAPINE 5 MG/1
5 TABLET, FILM COATED ORAL
Qty: 30 TABLET | Refills: 0 | Status: SHIPPED | OUTPATIENT
Start: 2025-07-15

## 2025-07-15 RX ORDER — PROCHLORPERAZINE MALEATE 10 MG
10 TABLET ORAL EVERY 6 HOURS PRN
Qty: 15 TABLET | Refills: 0 | Status: SHIPPED | OUTPATIENT
Start: 2025-07-15

## 2025-07-15 RX ORDER — BLOOD-GLUCOSE METER
KIT MISCELLANEOUS
Qty: 1 KIT | Refills: 0 | Status: SHIPPED | OUTPATIENT
Start: 2025-07-15 | End: 2025-07-15

## 2025-07-15 RX ORDER — CONTAINER,EMPTY
1 EACH MISCELLANEOUS ONCE
Qty: 1 EACH | Refills: 0 | Status: SHIPPED | OUTPATIENT
Start: 2025-07-15 | End: 2025-07-15

## 2025-07-15 RX ADMIN — HEPARIN SODIUM 5000 UNITS: 5000 INJECTION, SOLUTION INTRAVENOUS; SUBCUTANEOUS at 04:43

## 2025-07-15 RX ADMIN — SENNOSIDES AND DOCUSATE SODIUM 2 TABLET: 50; 8.6 TABLET ORAL at 08:10

## 2025-07-15 RX ADMIN — Medication 250 MG: at 04:43

## 2025-07-15 RX ADMIN — SERTRALINE HYDROCHLORIDE 100 MG: 100 TABLET ORAL at 08:10

## 2025-07-15 RX ADMIN — FAMOTIDINE 20 MG: 20 TABLET, FILM COATED ORAL at 08:09

## 2025-07-15 RX ADMIN — OXYCODONE HYDROCHLORIDE 5 MG: 5 TABLET ORAL at 04:43

## 2025-07-15 RX ADMIN — MAGNESIUM OXIDE TAB 400 MG (241.3 MG ELEMENTAL MG) 400 MG: 400 (241.3 MG) TAB at 13:19

## 2025-07-15 RX ADMIN — ACETAMINOPHEN 975 MG: 325 TABLET ORAL at 06:24

## 2025-07-15 RX ADMIN — ACETAMINOPHEN 975 MG: 325 TABLET ORAL at 13:18

## 2025-07-15 RX ADMIN — GABAPENTIN 100 MG: 100 CAPSULE ORAL at 08:10

## 2025-07-15 RX ADMIN — HEPARIN SODIUM 5000 UNITS: 5000 INJECTION, SOLUTION INTRAVENOUS; SUBCUTANEOUS at 12:18

## 2025-07-15 RX ADMIN — BUPRENORPHINE HYDROCHLORIDE 600 MCG: 600 FILM, SOLUBLE BUCCAL at 08:11

## 2025-07-15 RX ADMIN — Medication 25 MCG: at 08:09

## 2025-07-15 RX ADMIN — MAGNESIUM OXIDE TAB 400 MG (241.3 MG ELEMENTAL MG) 400 MG: 400 (241.3 MG) TAB at 10:20

## 2025-07-15 RX ADMIN — OXYCODONE HYDROCHLORIDE 5 MG: 5 TABLET ORAL at 08:40

## 2025-07-15 ASSESSMENT — ACTIVITIES OF DAILY LIVING (ADL)
ADLS_ACUITY_SCORE: 35

## 2025-07-15 NOTE — CONSULTS
"Diabetes CNS/Educator Consult    Teresa Sanderson is a 49 year old female per notes with multiple chronic comorbidites and recent L3-L5 spinal fusion, now with new onset severe hyperglycemia in the setting of recently diagnosed, not yet treated, diabetes mellitus (A1C 10.5%).     Diabetes CNS/Educator consulted for recently diagnosed T2DM with new insulin needs at discharge. A1c 10.5% on 7/7/25.    Inpatient Diabetes Service is following for glycemic management. Please see their notes for plan.    Met with Teresa and daughter at bedside for education. Daughter is PCA. Teresa will be managing diabetes cares at home with support from daughter.    Diet: Advance Diet as Tolerated: Regular Diet Adult       Barriers to diabetes management: Some forgetfulness    Diabetes Education Provided:  General Overview: Discussed reason for requiring blood glucose monitoring and insulin with Type 2 diabetes. Teresa has been reading \"Understanding Diabetes\" handout.   Insulin: Discussed insulin glargine and lispro action, dose, peak, duration, and frequency. Discussed proper insulin pen storage, preparation, injection technique, pen needle disposal in sharps container, and site selection and rotation. Alconiris successfully demonstrated administration with demo pen and injection pad. Provided \"4 mm pen needle - tips for good injection practice\" handout.   Monitoring: Frequency of BG testing discussed. BG targets discussed. Opened and set up Contour Next Glucose Meter. Reviewed lancing device set up, safe use, and lancet disposal in sharps. Reviewed meter set up, memory storage, and testing. Reviewed fingertip site selection and hygiene. Teresa successfully demonstrated blood glucose check with Glucose Meter.    Hypoglycemia: Discussed signs and symptoms of hypoglycemia. Reviewed treating blood glucose below 70 mg/dL with 15 grams of carbohydrates. Discussed examples of 15 grams of CHO. Reviewed treating blood glucose less than " or equal to 50 mg/dL with 30 grams of CHO. Discussed rechecking BG 15 minutes after treatment and retreating if necessary.   When to call: Advised calling provider for consistently elevated BGs and/or lows x2.   Follow-up: Discussed following up with PCP and Endocrinology in 1-2 weeks after discharge.     Discussion:  Diabetes education completed. Teresa was engaged and asked good questions. She shows motivation to manage diabetes with insulin plan and lifestyle changes. Daughter explained that she was very tired but she listened and observed skills. Both stated that plan is manageable. All questions answered. Notified RN and IDS CNP.    Supplies Needed at Discharge: please use the DIAB non-branded discharge supply order set ([id number])   Marcy Contour Next glucose meter   Marcy Contour Next test strips   Marcy Microlet Lancets   Sharps Container   Alcohol Wipes   B-D 4 mm ember pen needles   Lantus Solostar pens   Humalog Kwikpens     Diabetes Plan Reviewed:   Blood glucose monitoring: Three times daily before meals, and at bedtime.  Blood Glucose (BG) goals:  mg/dL before meals, less than 180 mg/dL 2 hrs after meals.     Glucose Control Regimen:   1) Long-acting insulin: glargine (Lantus) - take 50 units once daily at 10 am. Take at same time each day.     2) Rapid-acting insulin: lispro (Humalog) carbohydrate coverage/mealtime insulin -   Take 30 units before Large meals that contain carbohydrates (150-200 grams of carbs)  Take 20 units before average meals or snacks that contain carbohydrates (100-120 grams of carbs)  Take 10 units before small meals that contain carbohydrates (50-70 grams of carbs)     4) Rapid-acting insulin: lispro (Humalog) correction - see chart below. Take for high blood glucoses three times daily before meals and at bedtime.   You may add the correction dose to the carbohydrate coverage/mealtime insulin dose and give in one injection--ideally 10-15 minutes before a meal.  You may  take the correction dose even if you skip a meal (as long as it has been 4 hours since previous correction dose).      Pre-meal correction scale:     Blood Glucose Insulin Humalog Before Meals:   Less than 140 0 units   140 -164  1 unit   165 -189 2 units   190 - 214 3 units   215 - 239 4 units    240 - 264  5 units   265 - 289 6 units   290 - 314 7 units   315 - 339 8 units   340 - 364 9 units   365 or more 10 units   Bedtime correction scale:      Blood Glucose Insulin Humalog At Bedtime:   Less than 200 0 units   200 - 224 1 unit   225 - 249 2 units   250 - 274 3 units   275 - 299  4 units    300 - 324  5 units   325 - 349 6 units   350 or more 7 units       Outpatient Diabetes Follow-Up: Follow up with your Primary Care Provider (PCP) in 1-2 weeks, bring glucose data to your appointment. An appointment request was sent to the [facility] coordinator to schedule your outpatient diabetes appointment 1-2 weeks from discharge. You can call the [facility] at [phone] if you have scheduling questions or do not hear from them within a few days of discharge. Follow up sooner if blood glucose runs consistently greater than 200 mg/dL or if having more than two episodes less than 70 mg/dL.     If you have urgent questions or concerns regarding your blood sugars or insulin, you may contact [phone] (the main hospital ). Ask to speak with the Endocrinologist on call.     Your target A1c value is less than 7% to help prevent future complications from diabetes. Your most recent A1c is 10.5%.      Thank you for letting the Diabetes Management Team be involved in your car!    Hypoglycemia (Low Blood Glucose) Management:  Signs/symptoms:  Shaking, sweating, fast heartbeat  Feeling dizzy, tired, or weak   Feeling anxious and easy to irritate  Feeling nervous, crabby, or confused  Hunger  Vision problems, headache  Numb or tingling mouth    If blood glucose is 51 to 70:   Eat or drink  15 grams of carbohydrate. Examples:   1/2 cup (4 ounces) apple juice or regular soda pop, or   1 cup (8 ounces) milk, or   15 skittles, or   3 to 4 glucose tablets.   Re-check your blood glucose in 15 minutes.   Repeat these steps every 15 minutes until your blood glucose is above 80.       If blood glucose is 50 or less:   Eat or drink 30 grams of carbohydrate. Examples:   1 cup (8 ounces) apple juice or regular soda pop, or   2 cups (16 ounces) milk, or   1 banana, or   6 to 8 glucose tablets.   Re-check your blood glucose in 15 minutes.   Repeat these steps every 15 minutes until your blood glucose is above 80.     RENO Pineda, FRANKLYN, [facility]  Diabetes Educator  Pager: [phone]  Office: [phone]  Securely message with Felipe

## 2025-07-15 NOTE — PLAN OF CARE
Status: Admitted 7/7, POD7 L3-5 fusion w/ pedicle screws, stay c/b pain and insulin gtt. PMHx of metastatic breast cancer, smoker, DVT, schizoaffective disorder, bipolar disorder and chronic pain.      Vitals: VSS on RA  Neuros: A&O x4, strengths 5/5, denied N/T  IV: PIVs SL  Labs/Electrolytes: WDL   Resp: LSC on RA  Diet: Reg, carb coverage  GI: x3 large BMs this shift  : Voiding spontaneously  Skin: Back incision is WDL and ALEXI, old hemovac site is covered w/ primapore and CDI  Pain: 10/10 back pain, PRN meds given w/ minimal relief  Activity: SBA walker  Plan: Therapies have changed their recs to home w/ home PT, pending DME

## 2025-07-15 NOTE — PROGRESS NOTES
Interval Note for Billing: Patient requires 4x a day (three times a day with meals and at bedtime) insulin checks due to her poorly controlled blood glucoses; this was monitored inpatient and deemed to be clinically necessary by her endocrinology team.      Gill Bolden MD  Neurosurgery PGY-2  Personal pager #[phone]  Please page #[phone] (urgent)/VOCERA (non-urgent) the on-call neurosurgery resident per [facility] with questions

## 2025-07-15 NOTE — PROGRESS NOTES
Discharge time/date: 7/15 1700  Walked or Wheelchair: wheelchair  PIV removed: yes  Reviewed AVS with patient: yes, daughter present as well  Medication due times added to AVS in EPIC: yes  Verbalized understanding of discharge with teachback: yes  Medications retrieved from pharmacy: yes  Supplies sent home: commode and lifted toilet seat

## 2025-07-15 NOTE — PLAN OF CARE
Goal Outcome Evaluation: [id number]      Plan of Care Reviewed With: patient    Overall Patient Progress: no change    Status: POD8 L3-5 fusion w/ pedicle screws, stay c/b pain and insulin gtt. PMHx of metastatic breast cancer, smoker, DVT, schizoaffective disorder, bipolar disorder and chronic pain.       Vitals: VSS on RA  Neuros: A&O x4, strengths 5/5, denied N/T  IV: PIVs SL  Resp:no SOB on room air.   Diet: Reg, carb coverage  GI: last BM 07/15  : Voiding well   Skin: lower back incision and old hemovac site  is WNL - ALEXI.   Pain: 6/7 pain on L pinky toe, PRN meds given w/ minimal relief  Activity: SBA walker  Plan: possible discharge today pending DME.

## 2025-07-15 NOTE — PLAN OF CARE
Goal Outcome Evaluation:      Plan of Care Reviewed With: patient    Overall Patient Progress: no changeOverall Patient Progress: no change    A &O x4. VSS. On RA. Has order to discharge home. Patient is waiting to see Diabetic educator. Daughter in room with her 2 children. Gave oxycodone for low back incision pain. Up with 1 assist and a walker. Voids spont. Had 1 loose brown BM. Replaced magnesium per protocol

## 2025-07-15 NOTE — PROGRESS NOTES
"SPIRITUAL HEALTH SERVICES Progress Note  ([place]) 6A  Referral Source/Reason for Visit: Follow-up Visit  Summary and Recommendations -    Teresa voiced both grief and gratitude today.  She is thankful for the education she received about diabetes.  She said it helps her understand the \"dos and don'ts.\"  Teresa finds meaning and hope in her [employer] bassam.  She looks forward to going home today.    PLAN: Spiritual Health Services remain available on request. Please place a standard consult order on Epic.    Jahaira Frazier  Chaplain Resident    Spiritual Health Services is available 24/7 for emergent requests and consults, either by paging the on-call  or by entering an ASAP/STAT consult in [facility], which will also page the on-call .    Assessment    Saw pt Teresa Kin     Patient/Family Understanding of Illness and Goals of Care -   Teresa voiced that she was feeling \"thankful\" today. She received some education about her diabetes that helps her understand the \"dos and the don'ts,\" around living with diabetes. She looks forward to being discharged today.    Distress and Loss -   Teresa said she \"cried again this morning with her nurse.\" She is feeling overwhelmed by all that she is dealing with in life.    Strengths, Coping, and Resources -   Teresa recognizes her inner strength. She shared how her son marveled at her ability to live with so much pain and take care of herself despite it all.    Meaning, Beliefs, and Spirituality -   Teresa's bassam is a source of encouragement and hope for her.  Teresa requested prayer today which I provided for her.  "

## 2025-07-15 NOTE — PROGRESS NOTES
"CLINICAL NUTRITION SERVICES - ASSESSMENT NOTE    Registered Dietitian Interventions:  Provided and discussed handouts on MyPlate and Carbohydrate Counting.     Future/Additional Recommendations:  Monitor weight/intake trends at next follow-up, as able.     REASON FOR ASSESSMENT   Teresa Sanderson is a/an 49 year old female assessed by the dietitian for:  Provider order - Nutrition education - \"identifying carbohydrates\"    INFORMATION OBTAINED   Assessed patient in room.    NUTRITION HISTORY   Reports decreased appetite PTA and notes some weight loss.     CURRENT NUTRITION ORDERS   Diet: Regular    CURRENT INTAKE/TOLERANCE   Pt reports working on adequate intake with reference to Endocrinology's education provided earlier this admission. Pt appreciated further discussion and handouts on MyPlate and Carbohydrate Counting. Discussed sources of carbohydrates and food patterns at home and how to modify to still get foods she enjoys while preventing blood glucose spikes.     Per flowsheets, intake documentation likely missing some meals/snacks, but on average 75% intake of meals documented.     LABS   Nutrition-relevant labs: Reviewed     07/07/25 21:14   Hemoglobin A1C 10.5 (H)      07/15/25 06:29   Phosphorus 5.4 (H)     MEDICATIONS   Nutrition-relevant medications: Reviewed  - Pepcid  - Insulin  - Magnesium Oxide  - Miralax  - Senokot  - Vit D3  - PRNs Available: Dulcolax, Milk of Magnesia, Zofran, Oxycodone, Compazine    ANTHROPOMETRICS   Height: 182.9 cm (6' 0\")  Weight: 110.3 kg (243 lb 3.2 oz)  IBW: 72.7 kg   BMI: Body mass index is 32.98 kg/m . - Obesity Grade I BMI 30-34.9  Weight History: Teresa notes feeling as her weight has quickly dropped over the last few months PTA. Weight appears to fluctuate making it difficult to assess PTA baseline weight.   Wt Readings from Last Encounters:   07/15/25 110.3 kg (243 lb 3.2 oz)   06/23/25 106.1 kg (234 lb)   06/20/25 109.3 kg (241 lb)   06/19/25 108.1 kg (238 lb 6.4 " oz)   05/27/25 109.5 kg (241 lb 6.4 oz)   05/22/25 110.2 kg (243 lb)   05/15/25 113.9 kg (251 lb)   05/13/25 113.9 kg (251 lb)   04/24/25 113 kg (249 lb 1.6 oz)   04/17/25 114.3 kg (252 lb)   04/14/25 114.7 kg (252 lb 14.4 oz)   03/27/25 109.8 kg (242 lb)   03/27/25 109.8 kg (242 lb)   03/07/25 108.4 kg (239 lb)   03/04/25 108.7 kg (239 lb 11.2 oz)   02/27/25 109.8 kg (242 lb 1.6 oz)   02/18/25 109.8 kg (242 lb)   01/21/25 109.8 kg (242 lb)   01/20/25 110.1 kg (242 lb 11.2 oz)   12/24/24 107.9 kg (237 lb 12.8 oz)   12/23/24 107.3 kg (236 lb 9.6 oz)   12/05/24 111.1 kg (244 lb 14.4 oz)   11/25/24 107 kg (236 lb)   10/30/24 107.1 kg (236 lb 1.8 oz)   10/29/24 106.2 kg (234 lb 3.2 oz)     ASSESSED NUTRITION NEEDS   Dosing Weight: 82, based on adjusted wt  Estimated Energy Needs: 2280-5295 kcals/day (25 - 30 kcals/kg)  Justification: Maintenance  Estimated Protein Needs: 100-125 grams protein/day (1.2 - 1.5 grams of pro/kg)  Justification: Increased needs vs Maintenance  Estimated Fluid Needs: 4991-4730 mL/day (25 - 30 mL/kg)  Justification: Maintenance    SYSTEM AND PHYSICAL FINDINGS    GI symptoms: Reviewed; Per flowsheets, last BM noted 7/14.  Skin/wounds: Reviewed; Corky score 18 with nutrition marked as adequate per flowsheets. 1+ edema noted. Surgical incision noted.     MALNUTRITION   % Intake: Decreased intake does not meet criteria  % Weight Loss: Unable to assess   Subcutaneous Fat Loss: None visually observed  Muscle Loss: None visually observed  Fluid Accumulation/Edema: Mild, 1+  Malnutrition Diagnosis: Patient does not meet two of the established criteria necessary for diagnosing malnutrition  Malnutrition Present on Admission: No    NUTRITION DIAGNOSIS   Food and nutrition related knowledge deficit related to new diabetes diagnosis as evidenced by patient self report.    INTERVENTIONS   Collaboration by nutrition professional with other providers  Manage composition of oral intake  Nutrition education  application  Nutrition education content    GOALS   Total avg nutritional intake to meet a minimum of 25 kcal/kg and 1.2 g PRO/kg daily (per dosing wt 82 kg).    MONITORING/EVALUATION   Progress toward goals will be monitored and evaluated per policy.

## 2025-07-15 NOTE — TELEPHONE ENCOUNTER
[facility] Call Center    Phone Message    May a detailed message be left on voicemail: yes     Reason for Call: Order(s): Home Care Orders: Other: delay of start of care per patients request to 07/18/2025. Please call with verbal orders

## 2025-07-15 NOTE — PROGRESS NOTES
Inpatient Diabetes Management Service: Daily Progress Note     HPI: 49 year old female with multiple chronic comorbidites and recent L3-L5 spinal fusion, now with new onset severe hyperglycemia in the setting of recently diagnosed, not yet treated, diabetes mellitus (A1C 10.5%).             Assessment/Plan:       Assessment:   DM2 with hyperglycemia and high insulin requirement  Stress hyperglycemia  Steroid hyperglycemia due to dexamethasone, resolved.        Plan/Recommendations:   --  Continue insulin aspart 1 unit/5 grams of carb TID AC and snacks  - Continue insulin apart high dose correction scale 1 per 25 gram TID AC and HS.  --Decrease  Lantus 60---->50 units /day at 1000  -- Diabetes education should be given on insulin administration and carb counting --> requesting nutrition consult (placed 7/14) on consistent carb diet, and updated educ on potential for home discharge (7/14).  Daughter (PCA) would be primary co-learner  -- timing of metformin start?, consideration of additional agents  -- Hypoglycemia protocol  -- Carb counting protocol      Discussion:  Low BGs yesterday. Reducing carb and Lantus dosing. Dm Education to see. Nutrition to see. Patient reports she would feel more comfortable with fixed meal doses. Cr 0.72 GFR >90 Anion gap 12 Bicarb 26 Hgb 8.2 WBC 5.8     CGM Criteria    The patient has a diagnosis of diabetes mellitus.    The patient has been using a blood glucose monitor to test 4+ times a day.    The patient is insulin-treated with three or more daily injections of insulin (basal and rapid acting).    The patient's insulin treatment regimen requires frequent adjustments due sliding scale, carb counting, and/or labile blood sugars.     Diabetes Management Discharge Plan  Instructions to patient were posted in AVS and discussed on day of discharge.   Medications and supplies are to be ordered by primary service on discharge.   *please use the DIAB non-branded  "discharge supply order set ([id number])*    Patient will need the following supplies prescribed:   Lantus Solostar pens  Novolog Flexpens  \"BD\" (32G x 4mm) insulin pen needles  Glucometer (if brand of meter not known, can be ordered \"no brand specified\" and note to pharmacy: \"can substitute per insurance coverage\")  Lancets  Test strips  Sharps container  Alcohol swabs     Blood glucose monitoring: Three times daily before meals, and at bedtime.  Blood Glucose (BG) goals:  mg/dL before meals, less than 180 mg/dL 2 hrs after meals.     Glucose Control Regimen:     1) Long-acting insulin: glargine (Lantus) - take 50 units once daily at 10 am. Take at same time each day.     2) Rapid-acting insulin: aspart (Novolog) carbohydrate coverage/mealtime insulin -     Take 30 units before Large meals that contain carbohydrates (150-200 grams of carbs)  Take 20 units before average meals or snacks that contain carbohydrates (100-120 grams of carbs)   Take 10 units before small meals that contain carbohydrates (50-70 grams of carbs)    4) Rapid-acting insulin: aspart (Novolog) correction - see chart below. Take for high blood glucoses three times daily before meals and at bedtime.   You may add the correction dose to the carbohydrate coverage/mealtime insulin dose and give in one injection--ideally 10-15 minutes before a meal.  You may take the correction dose even if you skip a meal (as long as it has been 4 hours since previous correction dose).     Pre-meal correction scale:    Blood Glucose Insulin Novolog Before Meals:   Less than 140 0 units   140 -164  1 units   165 -189 2 units   190 - 214 3 units   215 - 239 4 units    240 - 264  5 units   265 - 289 6 units   290 - 314 7 units   315 - 339 8 units   340 - 364 9 units   365 or more 10 units       Bedtime correction scale:     Blood Glucose Insulin Novolog At Bedtime:   Less than 200 0 units   200 - 224 1 units   225 - 249 2 units   250 - 274 3 units   275 - 299  4 " units    300 - 324  5 units   325 - 349 6 units   350 or more 7 units         Outpatient Diabetes Follow-Up: Follow up with your Primary Care Provider (PCP) in 1-2 weeks, bring glucose data to your appointment. An appointment request was sent to the [facility] coordinator to schedule your outpatient diabetes appointment 1-2 weeks from discharge. You can call the [facility] at [phone] if you have scheduling questions or do not hear from them within a few days of discharge. Follow up sooner if blood glucose runs consistently greater than 200 mg/dL or if having more than two episodes less than 70 mg/dL.     If you have urgent questions or concerns regarding your blood sugars or insulin, you may contact [phone] (the main hospital ). Ask to speak with the Endocrinologist on call.     Your target A1c value is less than 7% to help prevent future complications from diabetes. Your most recent A1c is 10.5%.      Thank you for letting the Diabetes Management Team be involved in your care     Discharge plan: TBD    - Pharmacy liaison consult completed. Insulin lantus and novolog flex pens covered with 0 co pay. Glucometer covered with zero co pay.  - Pharmacy liaison consult placed for checking coverage. Placed rational for CGM in note. See above.   -- she would like to have diabetes follow up at the [facility]--> adult endocrinology  ordered 7/14, for urgent 1-2weeks.    Please notify Inpatient Diabetes Service if changes are planned to steroids, nutrition, TPN/TF and anticipated procedures requiring prolonged NPO status.         Interval History/Review of Systems :   The last 24 hours progress and nursing notes reviewed.  No events overnight.     Planned Procedures/Surgeries: none    Inpatient Glucose Control:       Recent Labs   Lab 07/15/25  0629 07/14/25  2141 07/14/25  1730 07/14/25  1314 07/14/25  0838 07/14/25  0626   * 121* 83  85 101* 167*             Medications Impacting Glycemia:   Steroids:  On the day of surgery   D5W-containing solutions/medications: none   Other medications impacting glucose: Olanzapine         Nutrition:   Orders Placed This Encounter      Advance Diet as Tolerated: Regular Diet Adult    Supplements: None   TF: None  TPN: None            Diabetes History: see full consult note for complete diabetes history   Diabetes Type and Duration: recent diagnosis; but came to patient's awareness THIS admission  PTA Medication Regimen: none taken, though metformin had been prescribed  Historical Diabetes Medications: none  Outpatient Diabetes Provider: PCP  Formal Diabetes Education/Educator: none---> has nutrition referral for late june           Physical Exam:   /67 (BP Location: Right arm)   Pulse 69   Temp 98.1  F (36.7  C) (Oral)   Resp 18   Ht 1.829 m (6')   Wt 103.6 kg (228 lb 6.3 oz)   SpO2 97%   BMI 30.98 kg/m    General:  well appearing, in no acute distress.  HEENT:  NC/AT. MMM, sclera not injected  Lungs:  unremarkable, no new cough, no SOB  ABD:  rounded, soft, non-tender  Skin:  warm and dry, no obvious lesions  MSK:   moving all extremities  Mental Status:  Alert and oriented x3  Psych:   Cooperative, good eye contact, full/appropriate affect           Data:     Lab Results   Component Value Date    A1C 10.5 (H) 07/07/2025    A1C 8.6 (H) 06/19/2025    A1C 6.1 (H) 03/04/2025    A1C 6.0 (H) 09/11/2024    A1C 6.4 (H) 01/05/2023       ROUTINE IP LABS (Last four results)  BMP  Recent Labs   Lab 07/15/25  0629 07/14/25  2141 07/14/25  1730 07/14/25  1314 07/14/25  0838 07/14/25  0626 07/13/25  0939 07/13/25  0610 07/12/25  0917 07/12/25  0719     --   --   --   --  137  --  139  --  139   POTASSIUM 4.6  --   --   --   --  4.3  --  4.4  --  4.0   CHLORIDE 101  --   --   --   --  102  --  102  --  101   NANDO 10.0  --   --   --   --  9.5  --  9.5  --  9.2   CO2 26  --   --   --   --  26  --  27  --  28    BUN 8.3  --   --   --   --  9.7  --  11.3  --  10.8   CR 0.72  --   --   --   --  0.70  --  0.69  --  0.67   * 121* 83 85   < > 167*   < > 122*   < > 173*    < > = values in this interval not displayed.     CBC  Recent Labs   Lab 07/15/25  0629 07/14/25  0626 07/13/25  0610 07/12/25  0719   WBC 5.8 5.6 4.9 4.9   RBC 3.11* 2.96* 3.17* 2.94*   HGB 8.2* 8.0* 8.4* 7.8*   HCT 27.2* 26.3* 27.5* 25.2*   MCV 88 89 87 86   MCH 26.4* 27.0 26.5 26.5   MCHC 30.1* 30.4* 30.5* 31.0*   RDW 15.5* 15.2* 15.0 15.0    244 226 213     INRNo lab results found in last 7 days.    Inpatient Diabetes Service will continue to follow, please don't hesitate to contact the team with any questions or concerns.     RENO Mitchell CNP    Plan discussed with patient, bedside RN, and primary team via this note.    To contact Inpatient Diabetes Service:     7 AM - 5 PM: Page the IDS ALEKSANDRA following the patient that day (see filed or incomplete progress notes/consult notes under Endocrinology)    OR if uncertain of provider assignment: page job code [phone]    5 PM - 7 AM: First call after hours is to primary service.    For urgent after-hours questions, page job code for on call fellow: [phone]     I spent a total of 45 minutes on the date of the encounter doing prep/post-work, chart review, history and exam, documentation and further activities per the note including lab review, multidisciplinary communication, counseling the patient and/or coordinating care regarding acute hyper/hypoglycemic management, as well as discharge management and planning/communication.

## 2025-07-15 NOTE — PLAN OF CARE
Physical Therapy Discharge Summary    Reason for therapy discharge:    Discharged to home with home therapy.    Progress towards therapy goal(s). See goals on Care Plan in [facility] electronic health record for goal details.  Goals partially met.  Barriers to achieving goals:   discharge from facility.    Therapy recommendation(s):    Continued therapy is recommended.  Rationale/Recommendations:  home PT to maximize safety and IND.

## 2025-07-15 NOTE — PROGRESS NOTES
Care Management Discharge Note    Discharge Date: 07/15/2025     Discharge Disposition: Home  Discharge Services: Home care  Discharge DME: raised toilet seat and commode to be issued by therapy     Discharge Transportation:  pt to arrange     Private pay costs discussed: Not applicable    Does the patient's insurance plan have a 3 day qualifying hospital stay waiver?  No    Education Provided on the Discharge Plan: Yes  Persons Notified of Discharge Plans: Patient  Patient/Family in Agreement with the Plan: Yes    Handoff Referral Completed: Yes, [facility] PCP: Internal handoff referral completed    Additional Information:  Patient status reviewed, discussed in discharge rounds. Per provider, patient is medically ready for discharge.    Therapy is issuing a commode and raised toilet seat for discharge.     Call placed to patient's CADI  Digna Emiliano to discuss DME needs and verify they can be covered by her waiver. She did not answer so a voicemail was left requesting a call back. Also attempted to reach her yesterday, did not hear from her after 3 calls and 1 voicemail. Spoke with Sangeetha Dennis (ph: [phone]), patient's emergency program worker, late yesterday afternoon. Sangeetha said she would attempt to contact Digna to relay questions about DME coverage. Call placed to Sangeetha this morning to follow up, she did not answer so a voicemail was left requesting a call back. E-mail also sent.     AIDEN Page liaison updated on discharge plan.     Per discussion with patient yesterday, she will coordinate transport home with Sangeetha and did not need assistance.     IMM given by CHW. Handoff complete.     [facility] - accepted for home RN/PT/OT  Ph: [phone]  Fax: [phone]    [facility] - Digna Cummings  Ph: [phone]    Emergency program - [email or web address]  Ph: [phone]    Zahra Jimenez RN, BSN  6A Nurse Care Coordinator  [facility] Acute Care Management  Phone: [phone]   Vocera:  6A Neuro RNCC

## 2025-07-16 ENCOUNTER — PATIENT OUTREACH (OUTPATIENT)
Dept: CARE COORDINATION | Facility: CLINIC | Age: 50
End: 2025-07-16
Payer: MEDICARE

## 2025-07-16 DIAGNOSIS — Z09 HOSPITAL DISCHARGE FOLLOW-UP: ICD-10-CM

## 2025-07-16 NOTE — TELEPHONE ENCOUNTER
Left detailed VM on confidential voicemail box for Breanna with [facility] with the following verbal order(s): Home Care Orders: Other: delay of start of care per patients request to 07/18/2025.  Zahra DAWKINS LPN  [facility]

## 2025-07-16 NOTE — PLAN OF CARE
Occupational Therapy Discharge Summary    Reason for therapy discharge:    Discharged to home with home therapy.    Progress towards therapy goal(s). See goals on Care Plan in [facility] electronic health record for goal details.  Goals met    Therapy recommendation(s):    Continued therapy is recommended.  Rationale/Recommendations:  HH therapies to progress functional IND and safety.

## 2025-07-16 NOTE — PROGRESS NOTES
Clinic Care Coordination Contact  [facility]/Voicemail    Clinical Data: Care Coordinator Outreach    Outreach Documentation Number of Outreach Attempt   7/16/2025   1:30 PM 1       Left message on patient's voicemail with call back information and requested return call.      Plan: Care Coordinator will try to reach patient again in 1-2 business days.    GAMA TROTTER RN on 7/16/2025 at 1:30 PM

## 2025-07-17 ENCOUNTER — TELEPHONE (OUTPATIENT)
Dept: NEUROSURGERY | Facility: CLINIC | Age: 50
End: 2025-07-17
Payer: MEDICARE

## 2025-07-17 NOTE — PROGRESS NOTES
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 Pt shared that she is doing well. Hanging in there. Pt went through insulin and blood sugar teaching with her son today and was training him on how to help her. Pt shares has all medications and just checked blood sugar which was 143. Pt shares numbers have been appropriate, not greater than 200, no low blood sugars. Pt has all appropriate specialty clinic visits. RN offered to help schedule PCP visit and pt accepted. RN scheduled patient for visit with PCP on 7/29 next available.  How are your symptoms? (Red Flag symptoms escalate to triage hotline per guidelines): Improved  Do you know how to contact your clinic care team if you have future questions or changes to your health status? : Yes  Does the patient have their discharge instructions? : Yes  Does the patient have questions regarding their discharge instructions? : No  Were you started on any new medications or were there changes to any of your previous medications? : Yes  Does the patient have all of their medications?: Yes  Do you have questions regarding any of your medications? : No  Do you have all of your needed medical supplies  or equipment (DME)?  (i.e. oxygen tank, CPAP, cane, etc.): Yes                Follow up Plan     Discharge Follow-Up  Discharge follow up appointment scheduled in alignment with recommended follow up timeframe or Transitions of Risk Category? (Low = within 30 days; Moderate= within 14 days; High= within 7 days): Yes  Discharge Follow Up Appointment Date: 07/29/25  Discharge Follow Up Appointment Scheduled with?: Primary Care Provider    Future Appointments   Date Time Provider Department Center   [facility]  [facility]   7/21/2025  9:00 AM Jahaira Plunkett MD [facility]   7/21/2025 10:00 AM Renetta Miranda PA-C [facility]   7/23/2025  1:30 PM Karma He PA-C [facility]   7/29/2025  9:40 AM Lavonne Frazier MD [facility]   8/22/2025  1:00 PM Kayleen Bell MD [facility]   9/16/2025  2:10 PM Lavonne Frazier MD [facility]       Outpatient Plan as outlined on AVS reviewed with patient.      For any urgent concerns, please contact our 24 hour nurse triage line: [phone]       GAMA TROTTER RN

## 2025-07-17 NOTE — TELEPHONE ENCOUNTER
POST-OPERATIVE FOLLOW UP:     Called patient for post-op follow up    Surgeon: Kp  Date of Surgery: 7/7/25  Procedure(s) performed:  Lumbar 3-5 fusion with pedicle screws   Discharge Date: 7/15/25    Current status:  Patient reports doing well since discharge. Greatest concern at the moment is pain management and constipation.     Pain Assessment:  Reports of post-op pain: Pain is controlled with current analgesics.  Medication(s) being used: acetaminophen and narcotic analgesics including oxycodone, gabapentin and Roboxin.  Pain is located at the incision site. Pain is worse when in the morning when waking up.    Teaching: Discussed alternating narcotic pain medication with over-the-counter medications, such as acetaminophen (Tylenol), to help maintain consistent pain control and reduce narcotic use as able. Ice packs can be used to help reduce swelling and manage discomfort. Heating pads may be helpful for muscle tension or stiffness, but should not be used directly over the incision site. Always use a barrier (such as a towel or cloth) between the skin and the ice or heat source to avoid skin injury. Limit application to 15-20 minutes at a time.     Activity:  Patient is walking intermittently around home. Reviewed teaching points below. Needs to reminder herself not to bend and ask for help.    Teaching: Do not do any bending, twisting, strenuous exercise, or heavy lifting (greater than 10 pounds) for 4-6 weeks. Be careful and ask for assistance when walking or going up and down stairs.     Incision   Incision is open to air and patient denies signs or symtpoms of infection. Reviewed teaching points below. States her son took a photo of the incision and she took her first shower at home last night.    Teaching: You should keep the wound undressed and open to air. You are allowed to take showers and get the wound wet but you may not scrub or soak the wound or keep it submerged under water. If you do happen to  get the wound wet, be sure to pat dry it rather than scrubbing it with a towel.     Appetite  Appetite has been good. Denies issues with nausea.     Bowel Issues  Patient reports concerns with bowels/constipation. Last bowel movement 2 days ago on 7/15/25. Has multiple laxative options at home and advised patient that if she did not have a bowel movement today, to add an additional agent tomorrow. Reviewed teaching points below.    Teaching: All opioids tend to cause constipation. Drink plenty of water and eat foods that have a lot of fiber, such as fruits, vegetables, prune juice, apple juice and high-fiber cereal. Take a laxative (Miralax, milk of magnesia, Colace, Senna) if you don t move your bowels at least every other day.      Bladder issues  Patient reports no concerns with urinating. No issues with starting, stopping, or being able to control bladder.        Discharge instructions and medication use were reviewed. Direct phone number and clinic phone number provided. Patient has no further questions and verbalized understanding and agreement with current plan.    Follow up appointment time reviewed and she will plan to arrange transportation.     WONG Colon RN, BSN  RN Coordinator Neurosurgery   Direct: [phone]  Clinic: [phone]  After hours/weekends: [phone] (ask for Neurosurgery resident on-call)

## 2025-07-21 ENCOUNTER — APPOINTMENT (OUTPATIENT)
Dept: CT IMAGING | Facility: CLINIC | Age: 50
End: 2025-07-21
Attending: EMERGENCY MEDICINE
Payer: MEDICARE

## 2025-07-21 ENCOUNTER — HOSPITAL ENCOUNTER (EMERGENCY)
Facility: CLINIC | Age: 50
Discharge: HOME | End: 2025-07-21
Attending: EMERGENCY MEDICINE | Admitting: EMERGENCY MEDICINE
Payer: MEDICARE

## 2025-07-21 VITALS
HEART RATE: 74 BPM | BODY MASS INDEX: 31.46 KG/M2 | OXYGEN SATURATION: 100 % | WEIGHT: 232 LBS | TEMPERATURE: 98.2 F | SYSTOLIC BLOOD PRESSURE: 110 MMHG | DIASTOLIC BLOOD PRESSURE: 71 MMHG | RESPIRATION RATE: 19 BRPM

## 2025-07-21 DIAGNOSIS — M54.50 MIDLINE LOW BACK PAIN, UNSPECIFIED CHRONICITY, UNSPECIFIED WHETHER SCIATICA PRESENT: ICD-10-CM

## 2025-07-21 DIAGNOSIS — E11.9 NEW ONSET TYPE 2 DIABETES MELLITUS (H): ICD-10-CM

## 2025-07-21 LAB
ALBUMIN SERPL BCG-MCNC: 3.1 G/DL (ref 3.5–5.2)
ALBUMIN UR-MCNC: NEGATIVE MG/DL
ALP SERPL-CCNC: 128 U/L (ref 40–150)
ALT SERPL W P-5'-P-CCNC: 38 U/L (ref 0–50)
AMPHETAMINES UR QL SCN: ABNORMAL
ANION GAP SERPL CALCULATED.3IONS-SCNC: 12 MMOL/L (ref 7–15)
APPEARANCE UR: CLEAR
AST SERPL W P-5'-P-CCNC: 27 U/L (ref 0–45)
BACTERIA #/AREA URNS HPF: ABNORMAL /HPF
BARBITURATES UR QL SCN: ABNORMAL
BASOPHILS # BLD AUTO: 0 10E3/UL (ref 0–0.2)
BASOPHILS NFR BLD AUTO: 0 %
BENZODIAZ UR QL SCN: ABNORMAL
BILIRUB SERPL-MCNC: <0.2 MG/DL
BILIRUB UR QL STRIP: NEGATIVE
BUN SERPL-MCNC: 20.6 MG/DL (ref 6–20)
BZE UR QL SCN: ABNORMAL
CALCIUM SERPL-MCNC: 8.3 MG/DL (ref 8.8–10.4)
CANNABINOIDS UR QL SCN: ABNORMAL
CHLORIDE SERPL-SCNC: 108 MMOL/L (ref 98–107)
COLOR UR AUTO: ABNORMAL
CREAT SERPL-MCNC: 0.88 MG/DL (ref 0.51–0.95)
EGFRCR SERPLBLD CKD-EPI 2021: 80 ML/MIN/1.73M2
EOSINOPHIL # BLD AUTO: 0.4 10E3/UL (ref 0–0.7)
EOSINOPHIL NFR BLD AUTO: 4 %
ERYTHROCYTE [DISTWIDTH] IN BLOOD BY AUTOMATED COUNT: 16.7 % (ref 10–15)
ETHANOL UR QL SCN: NORMAL
FENTANYL UR QL: ABNORMAL
GLUCOSE BLDC GLUCOMTR-MCNC: 107 MG/DL (ref 70–99)
GLUCOSE SERPL-MCNC: 108 MG/DL (ref 70–99)
GLUCOSE UR STRIP-MCNC: NEGATIVE MG/DL
HCO3 SERPL-SCNC: 20 MMOL/L (ref 22–29)
HCT VFR BLD AUTO: 24.1 % (ref 35–47)
HGB BLD-MCNC: 7.3 G/DL (ref 11.7–15.7)
HGB UR QL STRIP: NEGATIVE
HOLD SPECIMEN: NORMAL
IMM GRANULOCYTES # BLD: 0.1 10E3/UL
IMM GRANULOCYTES NFR BLD: 1 %
KETONES UR STRIP-MCNC: NEGATIVE MG/DL
LACTATE SERPL-SCNC: 0.8 MMOL/L (ref 0.7–2)
LEUKOCYTE ESTERASE UR QL STRIP: NEGATIVE
LYMPHOCYTES # BLD AUTO: 1.1 10E3/UL (ref 0.8–5.3)
LYMPHOCYTES NFR BLD AUTO: 12 %
MAGNESIUM SERPL-MCNC: 1.6 MG/DL (ref 1.7–2.3)
MCH RBC QN AUTO: 26.6 PG (ref 26.5–33)
MCHC RBC AUTO-ENTMCNC: 30.3 G/DL (ref 31.5–36.5)
MCV RBC AUTO: 88 FL (ref 78–100)
MONOCYTES # BLD AUTO: 0.7 10E3/UL (ref 0–1.3)
MONOCYTES NFR BLD AUTO: 7 %
MUCOUS THREADS #/AREA URNS LPF: PRESENT /LPF
NEUTROPHILS # BLD AUTO: 6.6 10E3/UL (ref 1.6–8.3)
NEUTROPHILS NFR BLD AUTO: 75 %
NITRATE UR QL: NEGATIVE
NRBC # BLD AUTO: 0 10E3/UL
NRBC BLD AUTO-RTO: 0 /100
NT-PROBNP SERPL-MCNC: <36 PG/ML (ref 0–192)
OPIATES UR QL SCN: ABNORMAL
OXYCODONE UR QL: ABNORMAL
PCP QUAL URINE (ROCHE): ABNORMAL
PH UR STRIP: 5.5 [PH] (ref 5–7)
PLATELET # BLD AUTO: 272 10E3/UL (ref 150–450)
POTASSIUM SERPL-SCNC: 3.1 MMOL/L (ref 3.4–5.3)
PROT SERPL-MCNC: 6.2 G/DL (ref 6.4–8.3)
RBC # BLD AUTO: 2.74 10E6/UL (ref 3.8–5.2)
RBC URINE: 0 /HPF
SODIUM SERPL-SCNC: 140 MMOL/L (ref 135–145)
SP GR UR STRIP: 1.01 (ref 1–1.03)
TROPONIN T SERPL HS-MCNC: 6 NG/L
TROPONIN T SERPL HS-MCNC: 8 NG/L
TSH SERPL DL<=0.005 MIU/L-ACNC: 2.6 UIU/ML (ref 0.3–4.2)
UROBILINOGEN UR STRIP-MCNC: NORMAL MG/DL
WBC # BLD AUTO: 8.7 10E3/UL (ref 4–11)
WBC URINE: <1 /HPF

## 2025-07-21 PROCEDURE — 83605 ASSAY OF LACTIC ACID: CPT | Performed by: EMERGENCY MEDICINE

## 2025-07-21 PROCEDURE — 80307 DRUG TEST PRSMV CHEM ANLYZR: CPT | Performed by: EMERGENCY MEDICINE

## 2025-07-21 PROCEDURE — 84484 ASSAY OF TROPONIN QUANT: CPT | Performed by: EMERGENCY MEDICINE

## 2025-07-21 PROCEDURE — 70450 CT HEAD/BRAIN W/O DYE: CPT

## 2025-07-21 PROCEDURE — 81001 URINALYSIS AUTO W/SCOPE: CPT | Performed by: EMERGENCY MEDICINE

## 2025-07-21 PROCEDURE — 72131 CT LUMBAR SPINE W/O DYE: CPT | Mod: 26 | Performed by: RADIOLOGY

## 2025-07-21 PROCEDURE — 36415 COLL VENOUS BLD VENIPUNCTURE: CPT | Performed by: EMERGENCY MEDICINE

## 2025-07-21 PROCEDURE — 250N000011 HC RX IP 250 OP 636: Performed by: EMERGENCY MEDICINE

## 2025-07-21 PROCEDURE — 72131 CT LUMBAR SPINE W/O DYE: CPT

## 2025-07-21 PROCEDURE — 82962 GLUCOSE BLOOD TEST: CPT

## 2025-07-21 PROCEDURE — 96361 HYDRATE IV INFUSION ADD-ON: CPT | Performed by: EMERGENCY MEDICINE

## 2025-07-21 PROCEDURE — 83735 ASSAY OF MAGNESIUM: CPT | Performed by: EMERGENCY MEDICINE

## 2025-07-21 PROCEDURE — 83880 ASSAY OF NATRIURETIC PEPTIDE: CPT | Performed by: EMERGENCY MEDICINE

## 2025-07-21 PROCEDURE — 99000 SPECIMEN HANDLING OFFICE-LAB: CPT | Performed by: PATHOLOGY

## 2025-07-21 PROCEDURE — 82040 ASSAY OF SERUM ALBUMIN: CPT | Performed by: EMERGENCY MEDICINE

## 2025-07-21 PROCEDURE — 85004 AUTOMATED DIFF WBC COUNT: CPT | Performed by: EMERGENCY MEDICINE

## 2025-07-21 PROCEDURE — 80061 LIPID PANEL: CPT | Performed by: PHYSICIAN ASSISTANT

## 2025-07-21 PROCEDURE — 258N000003 HC RX IP 258 OP 636: Performed by: EMERGENCY MEDICINE

## 2025-07-21 PROCEDURE — 84443 ASSAY THYROID STIM HORMONE: CPT | Performed by: EMERGENCY MEDICINE

## 2025-07-21 PROCEDURE — 250N000013 HC RX MED GY IP 250 OP 250 PS 637: Performed by: EMERGENCY MEDICINE

## 2025-07-21 PROCEDURE — 80365 DRUG SCREENING OXYCODONE: CPT | Performed by: EMERGENCY MEDICINE

## 2025-07-21 PROCEDURE — 96365 THER/PROPH/DIAG IV INF INIT: CPT | Performed by: EMERGENCY MEDICINE

## 2025-07-21 PROCEDURE — 70450 CT HEAD/BRAIN W/O DYE: CPT | Mod: 26 | Performed by: RADIOLOGY

## 2025-07-21 PROCEDURE — 96368 THER/DIAG CONCURRENT INF: CPT | Performed by: EMERGENCY MEDICINE

## 2025-07-21 PROCEDURE — 80346 BENZODIAZEPINES1-12: CPT | Performed by: EMERGENCY MEDICINE

## 2025-07-21 PROCEDURE — 84443 ASSAY THYROID STIM HORMONE: CPT

## 2025-07-21 PROCEDURE — 82043 UR ALBUMIN QUANTITATIVE: CPT

## 2025-07-21 PROCEDURE — 99285 EMERGENCY DEPT VISIT HI MDM: CPT | Mod: 25 | Performed by: EMERGENCY MEDICINE

## 2025-07-21 PROCEDURE — 99285 EMERGENCY DEPT VISIT HI MDM: CPT | Performed by: EMERGENCY MEDICINE

## 2025-07-21 RX ORDER — POTASSIUM CHLORIDE 7.45 MG/ML
10 INJECTION INTRAVENOUS ONCE
Status: COMPLETED | OUTPATIENT
Start: 2025-07-21 | End: 2025-07-21

## 2025-07-21 RX ORDER — MAGNESIUM SULFATE HEPTAHYDRATE 40 MG/ML
2 INJECTION, SOLUTION INTRAVENOUS ONCE
Status: COMPLETED | OUTPATIENT
Start: 2025-07-21 | End: 2025-07-21

## 2025-07-21 RX ORDER — POTASSIUM CHLORIDE 20MEQ/15ML
40 LIQUID (ML) ORAL ONCE
Status: COMPLETED | OUTPATIENT
Start: 2025-07-21 | End: 2025-07-21

## 2025-07-21 RX ORDER — OXYCODONE HYDROCHLORIDE 5 MG/1
5 TABLET ORAL ONCE
Refills: 0 | Status: COMPLETED | OUTPATIENT
Start: 2025-07-21 | End: 2025-07-21

## 2025-07-21 RX ADMIN — SODIUM CHLORIDE 1000 ML: 0.9 INJECTION, SOLUTION INTRAVENOUS at 02:08

## 2025-07-21 RX ADMIN — SODIUM CHLORIDE 500 ML: 0.9 INJECTION, SOLUTION INTRAVENOUS at 00:51

## 2025-07-21 RX ADMIN — MAGNESIUM SULFATE HEPTAHYDRATE 2 G: 2 INJECTION, SOLUTION INTRAVENOUS at 04:19

## 2025-07-21 RX ADMIN — OXYCODONE HYDROCHLORIDE 5 MG: 5 TABLET ORAL at 14:45

## 2025-07-21 RX ADMIN — POTASSIUM CHLORIDE 10 MEQ: 7.46 INJECTION, SOLUTION INTRAVENOUS at 05:05

## 2025-07-21 ASSESSMENT — ACTIVITIES OF DAILY LIVING (ADL)
ADLS_ACUITY_SCORE: 59

## 2025-07-21 ASSESSMENT — COLUMBIA-SUICIDE SEVERITY RATING SCALE - C-SSRS
2. HAVE YOU ACTUALLY HAD ANY THOUGHTS OF KILLING YOURSELF IN THE PAST MONTH?: NO
1. IN THE PAST MONTH, HAVE YOU WISHED YOU WERE DEAD OR WISHED YOU COULD GO TO SLEEP AND NOT WAKE UP?: NO
6. HAVE YOU EVER DONE ANYTHING, STARTED TO DO ANYTHING, OR PREPARED TO DO ANYTHING TO END YOUR LIFE?: NO

## 2025-07-21 NOTE — CONSULTS
"Consult received for Vascular Access Team. Additional PIV not needed at this time. Patient has 2 PIVs. For additional needs place \"Consult for Inpatient Vascular Access Care\"  [id number] order in [facility].  "

## 2025-07-21 NOTE — DISCHARGE INSTRUCTIONS
Follow up with your primary care team or neurosurgery team as needed    Return to the ED if you have additional concerns

## 2025-07-21 NOTE — CONSULTS
[facility]       NEUROSURGERY CONSULTATION NOTE    This consultation was requested by Dr. Zimmer from the Emergency Medicine service.    Reason for Consultation: back pain    HPI: Teresa Sanderson is a 49 year old female s/p o-arm/stealth assisted Lumbar 3-5 fusion with pedicle screws on 7/7/2025 with Dr. Goodrich for a b/l L4 pedicle fracture. She presents with back pain x 1 day after a fall last night. Her back pain was improved after surgery until this fall. A CT of the lumbar spine was obtained which demonstrated a nonspecific fluid collection dorsal to the surgical site, likely a seroma. Her hardware was intact with no evidence of loosening. She does not have any weakness, numbness, change in bowel or bladder function, or other neurologic symptoms. Her incision remains clean, dry and intact. She has not had any leak from the incision.        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)     Prediabetes 2023    Pulmonary emboli (H)     Pyelonephritis     Schizoaffective disorder (H)     Tobacco use        PAST SURGICAL HISTORY:   Past Surgical History:   Procedure Laterality Date    COLONOSCOPY N/A 7/8/2022    Procedure: COLONOSCOPY, WITH POLYPECTOMY;  Surgeon: Ham Cano MD;  [place] [facility] [place]    ESOPHAGOSCOPY, GASTROSCOPY, DUODENOSCOPY (EGD), COMBINED N/A 3/7/2025    Procedure: ESOPHAGOGASTRODUODENOSCOPY, WITH BIOPSY;  Surgeon: Nguyen Holliday MD;  [place] [facility] [place]    IR LUMBAR KYPHOPLASTY VERTEBRAE  5/17/2021    LAPAROSCOPIC SALPINGO-OOPHORECTOMY Bilateral 8/20/2021    Procedure:  BILATERAL SALPINGO-OOPHORECTOMY, LAPAROSCOPIC;  Surgeon: Rory Lopez MD;  [place] [facility] [place]    OPTICAL TRACKING SYSTEM FUSION SPINE POSTERIOR LUMBAR THREE+ LEVELS N/A 7/7/2025    Procedure: o-arm/stealth assisted Lumbar 3-5 fusion with pedicle screws;  Surgeon: Modesta Goodrich MD;  Location: [facility] OR    TUBAL LIGATION  1998       FAMILY HISTORY:   Family History   Problem Relation Age of Onset    Lung Cancer Mother     Lung Cancer Father     Lupus Brother     Kidney Disease Brother     Diabetes Type 1 Daughter     Deep Vein Thrombosis (DVT) Daughter         provoked due to procedure and travel    Asthma Son     Cardiovascular Maternal Aunt     Diabetes Granddaughter     Hypertension Cousin     Diabetes Cousin     Anesthesia Reaction No family hx of        SOCIAL HISTORY:   Social History     Tobacco Use    Smoking status: Some Days     Current packs/day: 0.25     Average packs/day: 0.3 packs/day for 14.2 years (3.5 ttl pk-yrs)     Types: Cigarettes     Start date: 5/13/2011     Passive exposure: Current    Smokeless tobacco: Never    Tobacco comments:     6/19/25: Patient states she has had 2 cigarettes in the last 48 hours. States she is not smoking daily but trying to quit.   Substance Use Topics    Alcohol use: Not Currently     Comment: occ       MEDICATIONS:  (Not in a hospital admission)    Current Outpatient Medications   Medication Instructions    acetaminophen (TYLENOL) 975 mg, Oral, EVERY 8 HOURS    bisacodyl (DULCOLAX) 10 mg, Rectal, DAILY PRN    blood glucose (NO BRAND SPECIFIED) lancets standard Use to test blood sugar 2 times daily or as directed.    blood glucose (NO BRAND SPECIFIED) lancets standard Use to test blood sugar 4x a day as directed.    blood glucose (NO BRAND SPECIFIED) test strip Use to test blood sugar 2 times daily or as directed.    blood glucose (NO BRAND SPECIFIED) test strip Use to test blood sugar 4x a day as directed.    blood glucose monitoring (NO BRAND  SPECIFIED) meter device kit Use to test blood sugar 2 times daily or as directed.    Blood Glucose Monitoring Suppl (CONTOUR BLOOD GLUCOSE SYSTEM) w/Device KIT Utilize for close blood glucose monitoring. Test 4x a day as directed.    Buprenorphine HCl (BELBUCA) 600 mcg, Buccal, EVERY 12 HOURS    everolimus (AFINITOR) 10 mg, Oral, DAILY, Avoid grapefruit and grapefruit juice. Take with or without food, but should be consistent.    famotidine (PEPCID) 20 mg, Oral, 2 TIMES DAILY    fulvestrant (FASLODEX) 500 mg, EVERY 28 DAYS    gabapentin (NEURONTIN) 300 mg, Oral, AT BEDTIME    gabapentin (NEURONTIN) 100 mg, Oral, 2 TIMES DAILY    hydrOXYzine HCl (ATARAX) 50 mg, Oral, 4 TIMES DAILY PRN    insulin glargine (LANTUS PEN) 50 Units, Subcutaneous, EVERY MORNING    insulin lispro (HUMALOG KWIKPEN) 30 Units, Subcutaneous, 3 TIMES DAILY BEFORE MEALS, Take 30 units before Large meals that contain carbohydrates (150 -170 grams of carbs) Take 20 units before average meals or snacks that contain carbohydrates (100 -120 grams of carbs)  Take 10 units before small meals that contain carbohydrates (50 -70 grams of carbs)    insulin pen needle (32G X 4 MM) 32G X 4 MM miscellaneous Use 4x a day for insulin checks as directed.    Lidocaine (LIDOCARE) 4 % Patch 2 patches, Transdermal, EVERY 24 HOURS, To prevent lidocaine toxicity, patient should be patch free for 12 hrs daily.    magic mouthwash suspension (diphenhydrAMINE, lidocaine, aluminum-magnesium & simethicone) 10 mLs, Swish & Swallow, EVERY 6 HOURS PRN    magnesium hydroxide (MILK OF MAGNESIA) 400 MG/5ML suspension 30 mLs, Oral, DAILY PRN    [Paused] metFORMIN (GLUCOPHAGE) 500 mg, Oral, 2 TIMES DAILY WITH MEALS    methocarbamol (ROBAXIN) 250 mg, Oral, 4 TIMES DAILY PRN    naloxone (NARCAN) 4 mg, Alternating Nostrils, PRN, every 2-3 minutes until assistance arrives    nicotine (NICODERM CQ) 14 MG/24HR 24 hr patch 1 patch, Transdermal, EVERY 24 HOURS    OLANZapine (ZYPREXA) 5 mg,  Oral, AT BEDTIME PRN    ondansetron (ZOFRAN ODT) 4 mg, Oral, EVERY 6 HOURS PRN    oxyCODONE (ROXICODONE) 5 mg, Oral, EVERY 4 HOURS PRN    polyethylene glycol (MIRALAX) 17 g, Oral, DAILY    prochlorperazine (COMPAZINE) 10 mg, Oral, EVERY 6 HOURS PRN    QUEtiapine (SEROQUEL) 400 mg, Oral, AT BEDTIME    rivaroxaban ANTICOAGULANT (XARELTO) 20 mg, Oral, DAILY WITH SUPPER    senna-docusate (SENOKOT-S/PERICOLACE) 8.6-50 MG tablet 2 tablets, Oral, 2 TIMES DAILY    sertraline (ZOLOFT) 100 mg, Oral, EVERY MORNING    Vitamin D3 (CHOLECALCIFEROL) 25 mcg, Oral, DAILY         Allergies:  Allergies   Allergen Reactions    Contrast Dye      Pt developed nausea after isovue 370 injection on 6/9/21        Physical exam:   Blood pressure 112/63, pulse 80, temperature 98.2  F (36.8  C), temperature source Oral, resp. rate 20, weight 105.2 kg (232 lb), SpO2 99%, not currently breastfeeding.  CV: No peripheral edema, extremities well-perfused  PULM: breathing comfortably on room air   ABD: soft, non-distended  NEUROLOGIC:  -- Awake; Alert; oriented x 3  -- Follows commands briskly  -- +repetition, calculation, and naming  -- Speech fluent, spontaneous. No aphasia or dysarthria.  -- no gaze preference. No apparent hemineglect.  Cranial Nerves:  -- visual fields full to confrontation, PERRL 3-2mm bilat and brisk, extraocular movements intact  -- face symmetrical, tongue midline  -- sensory V1-V3 intact bilaterally  -- palate elevates symmetrically, uvula midline  -- hearing grossly intact bilat  -- Trapezii 5/5 strength bilat symmetric  -- Cerebellar: Finger nose finger without dysmetria, intact rapid alternating motions bilaterally    Motor:  Normal bulk / tone; no tremor, rigidity, or bradykinesia.  No muscle wasting or fasciculations  No Pronator Drift     Delt Bi Tri Hand Flexion/  Extension Iliopsoas Quadriceps Hamstrings Tibialis Anterior Gastroc    C5 C6 C7 C8/T1 L2 L3 L4-S1 L4 S1   R 5 5 5 5 5 5 5 5 5   L 5 5 5 5 5 5 5 5 5    Sensory:  intact to LT x 4 extremities     Reflexes:     Bi Tri BR Anya Pat Ach Bab    C5-6 C7-8 C6 UMN L2-4 S1 UMN   R 2+ 2+ 2+ Norm 2+ 2+ Norm   L 2+ 2+ 2+ Norm 2+ 2+ Norm       LABS:  Recent Labs   Lab 07/21/25  0837 07/21/25  0022 07/15/25  1311 07/15/25  0824 07/15/25  0629   NA  --  140  --   --  139   POTASSIUM  --  3.1*  --   --  4.6   CHLORIDE  --  108*  --   --  101   CO2  --  20*  --   --  26   ANIONGAP  --  12  --   --  12   * 108* 152*   < > 110*   BUN  --  20.6*  --   --  8.3   CR  --  0.88  --   --  0.72   NANDO  --  8.3*  --   --  10.0    < > = values in this interval not displayed.       Recent Labs   Lab 07/21/25  0212   WBC 8.7   RBC 2.74*   HGB 7.3*   HCT 24.1*   MCV 88   MCH 26.6   MCHC 30.3*   RDW 16.7*            IMAGING:  Recent Results (from the past 24 hours)   CT Head w/o Contrast    Narrative    EXAM: CT HEAD W/O CONTRAST  LOCATION: [facility]  DATE: 7/21/2025    INDICATION: fall, dizziness, syncope  COMPARISON: None.  TECHNIQUE: Routine CT Head without IV contrast. Multiplanar reformats. Dose reduction techniques were used.    FINDINGS:  INTRACRANIAL CONTENTS: No intracranial hemorrhage, extraaxial collection, or mass effect.  No CT evidence of acute infarct. Normal parenchymal attenuation. Normal ventricles and sulci.     VISUALIZED ORBITS/SINUSES/MASTOIDS: No intraorbital abnormality. No paranasal sinus mucosal disease. No middle ear or mastoid effusion.    BONES/SOFT TISSUES: No acute abnormality.      Impression    IMPRESSION:  1.  Normal head CT.   CT Lumbar Spine w/o Contrast    Narrative    EXAM: CT LUMBAR SPINE W/O CONTRAST  LOCATION: [facility]  DATE: 7/21/2025    INDICATION: lumbar pain, recent spinal fusion. confusion dizziness syncope, drop in hgb, hx contrast allergy. Bone metastatic breast cancer.  COMPARISON: Spine radiograph 07/10/2025. Lumbar spine CT  04/06/2025.  TECHNIQUE: Routine CT Lumbar Spine without IV contrast. Multiplanar reformats. Dose reduction techniques were used.    FINDINGS:   VERTEBRA: There are postoperative changes from posterior instrumented fusion L3-L5 with paired bilateral fusion rods and pedicle screws. Surgical hardware is intact and there is no evidence of hardware loosening. There is a low-density fluid collection   within the dorsal soft tissues at the operative site measuring approximately 5.2 x 2.7 cm in axial dimension by 10 cm CC. No soft tissue gas. There are also post treatment changes from L4 vertebral body kyphoplasty with unchanged presumed intravasation   of cement extending superiorly from the anterior margin of the vertebral body. Mixed lytic/sclerotic lesions of the T11, T12, L1, L2, L3, L4, L5, S1 vertebral bodies, and left ilium appear unchanged from the prior lumbar spine CT are most consistent with   osseous metastatic disease. There is stable diffuse sclerosis of the L4 vertebral body. Redemonstrated bilateral L4 pedicle fractures with mild interval widening of the fracture margins compared to the prior CT. No new/acute fracture. Vertebral body   heights are maintained. There is trace grade 1 anterolisthesis at L4-L5.    CANAL/FORAMINA: No evidence of high-grade osseous spinal canal or neural foraminal stenosis. There is advanced intervertebral disc height loss and endplate degenerative changes at L4-L5 and at L5-S1.    EXTRASPINAL: Degenerative changes of the bilateral sacroiliac joints.      Impression    IMPRESSION:  1.  Postoperative changes from posterior instrumented fusion from L3 through L5. Surgical hardware is intact and there is no evidence of hardware loosening.   2.  Nonspecific fluid collection within the dorsal soft tissues at the operative site which could represent a postoperative seroma or resolving subacute hematoma. Infection/abscess is not excluded and could be further assessed with a  contrast-enhanced   lumbar spine MRI.  3.  Redemonstrated bilateral L4 pedicle fractures with mild interval widening of the fracture margins compared to the prior CT.  4.  No new/acute fracture.  5.  Unchanged mixed lytic/sclerotic lesions of the T11, T12, L1, L2, L3, L4, L5, S1 vertebral bodies and left ileum consistent with osseous metastatic disease.  6.  Status post L4 kyphoplasty with stable appearing intravasation of cement.           ASSESSMENT:  Teresa Sanderson is a 49 year old female s/p Lumbar 3-5 fusion with pedicle screws on 7/7/2025 who presents with back pain x 1 day after a fall with CT demonstrating nonspecific dorsal fluid collection over the surgical site.       RECOMMENDATIONS:  No neurosurgical intervention indicated at this time   Reassuring clinical exam and no evidence to suggest surgical site infection. Fluid collection most likely represents an uncomplicated seroma.   Pain control per ED or primary team    Neurosurgery will follow peripherally at this time. Please don't hesitate to reach out if any further concerns arise.     The patient was discussed with Dr. Franklin, neurosurgery chief resident, and Dr. Goodrich, neurosurgery staff, and they agree with the above.    Lorena Cunha MD  Neurosurgery PGY-1  Personal pager #[phone]  Please page #[phone] (urgent)/[facility] (non-urgent) on-call neurosurgery resident with questions  I have seen this patient with the resident and formulated a plan and agree with this note.  AMP

## 2025-07-21 NOTE — ED NOTES
Bed: ED21  Expected date: 7/21/25  Expected time: 12:10 AM  Means of arrival:   Comments:  49yoF  dizziness

## 2025-07-21 NOTE — ED NOTES
Pt arouses to repeated verbal/touch stimulation, unable to keep eyes open.  Slurred speech.  Drooling noted.   When given water Pt did not swallow, drooled water a little bit and held water in mouth.  Pt's daughter stated her son found Pt on the floor and alerted her. Daughter had the impression Pt may of taken too much medication, unsure what medication was recently taken.  Family stated Pt had back surgery two weeks ago and has been home for a week.  Pt states she is continuously very dizzy.    1500mL NS IV fluid infusion.  Mg+ IV replacement.  CT scan.  K+ replacement

## 2025-07-21 NOTE — ED PROVIDER NOTES
Emergency Department Patient Sign-out       Brief HPI:  This is a 49 year old female signed out to me by Dr. Greene .  See initial ED Provider note for details of the presentation.  Mildly hypotensive, responded to IVF.  Hemoglobin 7.3, slightly decreased from previous.  Electrolytes replenished.  Head CT negative.  Lumbar CT pending.          Impression:    ICD-10-CM    1. Midline low back pain, unspecified chronicity, unspecified whether sciatica present  M54.50           Plan:    Lumbar CT with posterior seroma vs hematoma.  Neurosurgery evalauted the patient and note no acute concern for active hemorrhage needing operative management or additional advanced imaging.  No neurologic abnormalities.  Patient pain controlled on half dose oxycodone, able to walk without symptoms.  Eating and drinking.  Will follow up outpatient neurosurgery and primary doctor as needed.  Discussed staying hydrated and cutting oxycodone in half.  Discharged with return precautions       Monae Zimmer DO  07/21/25 1080

## 2025-07-21 NOTE — ED PROVIDER NOTES
ED Provider Note  [facility] EMERGENCY DEPARTMENT ([facility])    7/21/25       ED PROVIDER NOTE     History     Chief Complaint   Patient presents with    Syncope     Hand cramping     HPI  Teresa Sanderson is a 49 year old female with a notable history of T2DM, DVT anticoagulated on Xarelto, chronic pain, metastatic breast cancer with pathological fracture s/p lumbar fusion 7/7/2025, schizoaffective disorder, bipolar disorder, chronic pain, migraine, anemia who presents to the ED for evaluation of syncope. Patient reports that she was in bed and woke up on the floor next to her bed. She states that she thinks she got up at some point and might have passed out in her doorway. She states that her hands started cramping, and this lasted for about 2 hours with cramping pain getting progressively worse. Patient notes that she is feeling dizzy, and endorses back pain. She recently had a lumbar fusion on 7/7, and states that she had some back pain today prior to falling. Unsure if she hit her head. She states that she has only had 1 bowel movement since her surgery. Denies blood in stool. No fever, chills, or other cold symptoms.          Past Medical History  Past Medical History:   Diagnosis Date    Anxiety     Breast CA (H) 12/2020    presenting with bone mets    Depression     DM2 (diabetes mellitus, type 2) (H) 06/19/2025    DVT (deep venous thrombosis) (H) 2014    Hx of radiation therapy     Left breast mass     x approximately 4-5 months    Metastasis to bone (H)     Pathological fracture of vertebra due to malignant neoplasm metastatic to bone (H)     Prediabetes 2023    Pulmonary emboli (H)     Pyelonephritis     Schizoaffective disorder (H)     Tobacco use      Past Surgical History:   Procedure Laterality Date    COLONOSCOPY N/A 7/8/2022    Procedure: COLONOSCOPY, WITH POLYPECTOMY;  Surgeon: Ham Cano MD;  Location: [facility] OR    ESOPHAGOSCOPY, GASTROSCOPY,  DUODENOSCOPY (EGD), COMBINED N/A 3/7/2025    Procedure: ESOPHAGOGASTRODUODENOSCOPY, WITH BIOPSY;  Surgeon: Nguyen Holliday MD;  Location: [facility]    IR LUMBAR KYPHOPLASTY VERTEBRAE  5/17/2021    LAPAROSCOPIC SALPINGO-OOPHORECTOMY Bilateral 8/20/2021    Procedure: BILATERAL SALPINGO-OOPHORECTOMY, LAPAROSCOPIC;  Surgeon: Rory Lopez MD;  Location: [facility] OR    OPTICAL TRACKING SYSTEM FUSION SPINE POSTERIOR LUMBAR THREE+ LEVELS N/A 7/7/2025    Procedure: o-arm/stealth assisted Lumbar 3-5 fusion with pedicle screws;  Surgeon: Modesta Goodrich MD;  Location: [facility] OR    TUBAL LIGATION  1998     acetaminophen (TYLENOL) 325 MG tablet  bisacodyl (DULCOLAX) 10 MG suppository  blood glucose (NO BRAND SPECIFIED) lancets standard  blood glucose (NO BRAND SPECIFIED) lancets standard  blood glucose (NO BRAND SPECIFIED) test strip  blood glucose (NO BRAND SPECIFIED) test strip  blood glucose monitoring (NO BRAND SPECIFIED) meter device kit  Blood Glucose Monitoring Suppl (CONTOUR BLOOD GLUCOSE SYSTEM) w/Device KIT  Buprenorphine HCl (BELBUCA) 600 MCG FILM buccal film  everolimus (AFINITOR) 10 MG tablet  famotidine (PEPCID) 20 MG tablet  fulvestrant (FASLODEX) 250 MG/5ML injection  gabapentin (NEURONTIN) 100 MG capsule  gabapentin (NEURONTIN) 300 MG capsule  hydrOXYzine HCl (ATARAX) 50 MG tablet  insulin glargine (LANTUS PEN) 100 UNIT/ML pen  insulin lispro (HUMALOG KWIKPEN) 100 UNIT/ML (1 unit dial) KWIKPEN  insulin pen needle (32G X 4 MM) 32G X 4 MM miscellaneous  Lidocaine (LIDOCARE) 4 % Patch  magic mouthwash suspension (diphenhydrAMINE, lidocaine, aluminum-magnesium & simethicone)  magnesium hydroxide (MILK OF MAGNESIA) 400 MG/5ML suspension  [Paused] metFORMIN (GLUCOPHAGE) 500 MG tablet  methocarbamol (ROBAXIN) 500 MG tablet  naloxone (NARCAN) 4 MG/0.1ML nasal spray  nicotine (NICODERM CQ) 14 MG/24HR 24 hr patch  OLANZapine (ZYPREXA) 5 MG tablet  ondansetron (ZOFRAN ODT) 4 MG ODT tab  oxyCODONE (ROXICODONE) 5  MG tablet  polyethylene glycol (MIRALAX) 17 GM/Dose powder  prochlorperazine (COMPAZINE) 10 MG tablet  QUEtiapine (SEROQUEL) 400 MG tablet  rivaroxaban ANTICOAGULANT (XARELTO) 20 MG TABS tablet  senna-docusate (SENOKOT-S/PERICOLACE) 8.6-50 MG tablet  sertraline (ZOLOFT) 100 MG tablet  Vitamin D3 (CHOLECALCIFEROL) 25 mcg (1000 units) tablet      Allergies   Allergen Reactions    Contrast Dye      Pt developed nausea after isovue 370 injection on 6/9/21      Family History  Family History   Problem Relation Age of Onset    Lung Cancer Mother     Lung Cancer Father     Lupus Brother     Kidney Disease Brother     Diabetes Type 1 Daughter     Deep Vein Thrombosis (DVT) Daughter         provoked due to procedure and travel    Asthma Son     Cardiovascular Maternal Aunt     Diabetes Granddaughter     Hypertension Cousin     Diabetes Cousin     Anesthesia Reaction No family hx of      Social History   Social History     Tobacco Use    Smoking status: Some Days     Current packs/day: 0.25     Average packs/day: 0.3 packs/day for 14.2 years (3.5 ttl pk-yrs)     Types: Cigarettes     Start date: 5/13/2011     Passive exposure: Current    Smokeless tobacco: Never    Tobacco comments:     6/19/25: Patient states she has had 2 cigarettes in the last 48 hours. States she is not smoking daily but trying to quit.   Vaping Use    Vaping status: Never Used   Substance Use Topics    Alcohol use: Not Currently     Comment: occ    Drug use: Not Currently     Types: Marijuana     Comment: occ      A medically appropriate review of systems was performed with pertinent positives and negatives noted in the HPI, and all other systems negative.    Physical Exam   BP: 98/51  Pulse: 88  Temp: 98.2  F (36.8  C)  Resp: 20  Weight: 105.2 kg (232 lb)  SpO2: 97 %  Physical Exam  ***    ED Course, Procedures, & Data     ED Course as of 07/21/25 0600   Mon Jul 21, 2025   0230      EKG Interpretation:     Interpreted by Jackson Greene, DO  Alison  "reviewed:0230   Symptoms at time of EKG: dizziness   Rhythm: normal sinus   Rate: normal  Axis: NORMAL  Ectopy: none  Conduction: normal  ST Segments/ T Waves: No ST-T wave changes  Q Waves: none  Comparison to prior: similar morphology 6/10/25    Clinical Impression: normal EKG      Procedures       {ED Course Selections (Optional):773777}  {ED Sepsis CMS Documentation (Optional):280781::\" \"}       Results for orders placed or performed during the hospital encounter of 07/21/25   CT Head w/o Contrast    Impression    IMPRESSION:  1.  Normal head CT.   Extra Blue Top Tube   Result Value Ref Range    Hold Specimen JIC    Extra Red Top Tube   Result Value Ref Range    Hold Specimen JIC    Extra Green Top (Lithium Heparin) Tube   Result Value Ref Range    Hold Specimen JIC    Extra Purple Top Tube   Result Value Ref Range    Hold Specimen JIC    Comprehensive metabolic panel   Result Value Ref Range    Sodium 140 135 - 145 mmol/L    Potassium 3.1 (L) 3.4 - 5.3 mmol/L    Carbon Dioxide (CO2) 20 (L) 22 - 29 mmol/L    Anion Gap 12 7 - 15 mmol/L    Urea Nitrogen 20.6 (H) 6.0 - 20.0 mg/dL    Creatinine 0.88 0.51 - 0.95 mg/dL    GFR Estimate 80 >60 mL/min/1.73m2    Calcium 8.3 (L) 8.8 - 10.4 mg/dL    Chloride 108 (H) 98 - 107 mmol/L    Glucose 108 (H) 70 - 99 mg/dL    Alkaline Phosphatase 128 40 - 150 U/L    AST 27 0 - 45 U/L    ALT 38 0 - 50 U/L    Protein Total 6.2 (L) 6.4 - 8.3 g/dL    Albumin 3.1 (L) 3.5 - 5.2 g/dL    Bilirubin Total <0.2 <=1.2 mg/dL   Result Value Ref Range    Troponin T, High Sensitivity 6 <=14 ng/L   Lactic acid whole blood with 1x repeat in 2 hr when >2   Result Value Ref Range    Lactic Acid, Initial 0.8 0.7 - 2.0 mmol/L   TSH with free T4 reflex   Result Value Ref Range    TSH 2.60 0.30 - 4.20 uIU/mL   Result Value Ref Range    Magnesium 1.6 (L) 1.7 - 2.3 mg/dL   NT-proBNP   Result Value Ref Range    NT-proBNP <36 0 - 192 pg/mL   Result Value Ref Range    Troponin T, High Sensitivity 8 <=14 ng/L "   CBC with platelets and differential   Result Value Ref Range    WBC Count 8.7 4.0 - 11.0 10e3/uL    RBC Count 2.74 (L) 3.80 - 5.20 10e6/uL    Hemoglobin 7.3 (L) 11.7 - 15.7 g/dL    Hematocrit 24.1 (L) 35.0 - 47.0 %    MCV 88 78 - 100 fL    MCH 26.6 26.5 - 33.0 pg    MCHC 30.3 (L) 31.5 - 36.5 g/dL    RDW 16.7 (H) 10.0 - 15.0 %    Platelet Count 272 150 - 450 10e3/uL    % Neutrophils 75 %    % Lymphocytes 12 %    % Monocytes 7 %    % Eosinophils 4 %    % Basophils 0 %    % Immature Granulocytes 1 %    NRBCs per 100 WBC 0 <1 /100    Absolute Neutrophils 6.6 1.6 - 8.3 10e3/uL    Absolute Lymphocytes 1.1 0.8 - 5.3 10e3/uL    Absolute Monocytes 0.7 0.0 - 1.3 10e3/uL    Absolute Eosinophils 0.4 0.0 - 0.7 10e3/uL    Absolute Basophils 0.0 0.0 - 0.2 10e3/uL    Absolute Immature Granulocytes 0.1 <=0.4 10e3/uL    Absolute NRBCs 0.0 10e3/uL     Medications   potassium chloride 10 mEq in 100 mL sterile water infusion (10 mEq Intravenous $New Bag 7/21/25 0505)   sodium chloride 0.9% BOLUS 500 mL (0 mLs Intravenous Stopped 7/21/25 0145)   sodium chloride 0.9% BOLUS 1,000 mL (0 mLs Intravenous Stopped 7/21/25 0349)   magnesium sulfate 2 g in 50 mL sterile water intermittent infusion (2 g Intravenous $New Bag 7/21/25 0492)   potassium chloride (KAYCIEL) solution 40 mEq (40 mEq Oral Not Given 7/21/25 0508)          Critical care was not performed.     Medical Decision Making  The patient's presentation was of {[facility] Problem:983499}.    The patient's evaluation involved:  {[facility] Data:220549}    The patient's management necessitated {[facility] Management:260317}.    Assessment & Plan    Patient presents the ED for evaluation of syncope, hand cramping, and low back pain.  Recently underwent lumbar fusion on 7/7/2025.  Today, she had an episode where she got out of bed, and then awoke in between a door frame.  This was followed by cramping of her bilateral hands.  She was found by her grandson.  No reported seizure activity or postictal  period.    In the ED, pt has normal vital signs.  She seems somnolent and slightly pinpoint pupils.  She has a nonfocal neuroexam.  Low suspicion for ischemic CVA.  She does report some generalized dizziness.  CT head is negative for skull fracture, intracranial hemorrhage, or other acute pathology.  EKG shows normal sinus rhythm without signs of acute ischemia.  CBC without leukocytosis.  There is a mild anemia with a hemoglobin of 7.3, 8.2 on 7/15/2025.  No source of bleeding on history or exam.  Metabolic panel shows a potassium of 3.1 which was repleted in the ED.  Magnesium was 1.6 which was also repleted in the ED.    Reportedly hypotensive for EMS.  She was given a total of 1.5 L of IV crystalloid and has remained normotensive while in the ED.  Exact cause of her syncope is unclear at this time.  Could be related to dehydration or hypotension which is now resolved.  Patient reports that she has been taking 10 mg of oxycodone for her postsurgical back pain every 4 hours.  I feel this may be contributing to her somnolence/dizziness.  Her mental status has improved while in the ED.  Given her drop in hemoglobin and recent procedure, will order lumbar CT to rule out hematoma.  Her syncope could also be in response to her hemoglobin of 7.3.    Overall, patient seems to be clinically improving.  Her CT scan is pending.  Will sign out to the morning provider plan to follow-up on CT scan and reassess.  If she continues to improve and imaging is reassuring, would be appropriate for discharge.  Otherwise, could benefit from observation admission for monitoring and further workup.      I have reviewed the nursing notes. I have reviewed the findings, diagnosis, plan and need for follow up with the patient.    New Prescriptions    No medications on file       Final diagnoses:   None   IMatthew, am serving as a trained medical scribe to document services personally performed by Jackson Greene DO, based on the  provider's statements to me.     I, Jackson Greene DO, was physically present and have reviewed and verified the accuracy of this note documented by Matthew Condon.     Jackson Greene DO  [facility] EMERGENCY DEPARTMENT  7/21/2025

## 2025-07-21 NOTE — ED TRIAGE NOTES
BIBA from with chief complaint of syncope and cramping in hands. 500mL NS given en-route to the hospital by EM, BP 80/60 soft as per EMS.     Triage Assessment (Adult)       Row Name 07/21/25 0014          Triage Assessment    Airway WDL WDL        Respiratory WDL    Respiratory WDL WDL        Skin Circulation/Temperature WDL    Skin Circulation/Temperature WDL WDL        Cardiac WDL    Cardiac WDL WDL     Cardiac Rhythm NSR        Peripheral/Neurovascular WDL    Peripheral Neurovascular WDL WDL        Cognitive/Neuro/Behavioral WDL    Cognitive/Neuro/Behavioral WDL WDL

## 2025-07-22 ENCOUNTER — DOCUMENTATION ONLY (OUTPATIENT)
Dept: INTERNAL MEDICINE | Facility: CLINIC | Age: 50
End: 2025-07-22
Payer: MEDICARE

## 2025-07-22 ENCOUNTER — TELEPHONE (OUTPATIENT)
Dept: INTERNAL MEDICINE | Facility: CLINIC | Age: 50
End: 2025-07-22
Payer: MEDICARE

## 2025-07-22 NOTE — TELEPHONE ENCOUNTER
Spoke with Mars with [facility] and gave the following verbal order(s): Home Care Orders: Physical Therapy (PT): once a week for eight weeks.   Zahra DAWKINS LPN  [facility]

## 2025-07-22 NOTE — TELEPHONE ENCOUNTER
[facility] Call Center    Phone Message    May a detailed message be left on voicemail: yes     Reason for Call: Order(s): Home Care Orders: Physical Therapy (PT): once a week for eight weeks. Please call with verbal orders

## 2025-07-22 NOTE — PROGRESS NOTES
Type of Form Received: [facility] Client Coordination note     Form Received (Date) 7/18/2025   Form Filled out No   Placed in provider folder Yes

## 2025-07-23 ENCOUNTER — TELEPHONE (OUTPATIENT)
Dept: INTERNAL MEDICINE | Facility: CLINIC | Age: 50
End: 2025-07-23

## 2025-07-23 ENCOUNTER — OFFICE VISIT (OUTPATIENT)
Dept: ENDOCRINOLOGY | Facility: CLINIC | Age: 50
End: 2025-07-23
Attending: CLINICAL NURSE SPECIALIST
Payer: MEDICARE

## 2025-07-23 ENCOUNTER — DOCUMENTATION ONLY (OUTPATIENT)
Dept: NEUROSURGERY | Facility: CLINIC | Age: 50
End: 2025-07-23

## 2025-07-23 VITALS
WEIGHT: 232 LBS | DIASTOLIC BLOOD PRESSURE: 69 MMHG | BODY MASS INDEX: 31.46 KG/M2 | SYSTOLIC BLOOD PRESSURE: 104 MMHG | OXYGEN SATURATION: 99 % | HEART RATE: 78 BPM

## 2025-07-23 DIAGNOSIS — E11.9 NEW ONSET TYPE 2 DIABETES MELLITUS (H): ICD-10-CM

## 2025-07-23 LAB
CHOLEST SERPL-MCNC: 175 MG/DL
CREAT UR-MCNC: 54.6 MG/DL
HDLC SERPL-MCNC: 49 MG/DL
LDLC SERPL CALC-MCNC: 105 MG/DL
MICROALBUMIN UR-MCNC: <12 MG/L
MICROALBUMIN/CREAT UR: NORMAL MG/G{CREAT}
NONHDLC SERPL-MCNC: 126 MG/DL
NOROXYCODONE UR CFM-MCNC: 1279 NG/MG CREAT
NOROXYMORPHONE/CREAT UR CFM: 387 NG/MG CREAT
OXYCODONE UR CFM-MCNC: 762 NG/MG CREAT
OXYCODONE UR QL CFM: NORMAL
OXYMORPHONE UR CFM-MCNC: 940 NG/MG CREAT
TRIGL SERPL-MCNC: 103 MG/DL
TSH SERPL DL<=0.005 MIU/L-ACNC: 1.98 UIU/ML (ref 0.3–4.2)

## 2025-07-23 RX ORDER — HYDROCHLOROTHIAZIDE 12.5 MG/1
CAPSULE ORAL
Qty: 6 EACH | Refills: 4 | Status: SHIPPED | OUTPATIENT
Start: 2025-07-23

## 2025-07-23 ASSESSMENT — PAIN SCALES - GENERAL: PAINLEVEL_OUTOF10: SEVERE PAIN (10)

## 2025-07-23 NOTE — PROGRESS NOTES
Diabetes Consult Note  Jul 23, 2025      History of Present Illness     Teresa Sanderson is a 49 year old female who is seen for diabetes mellitus upon referral RENO Coker, CNS.      She is accompanied to today's appointment by her adult sons, Uzair and Boris.  Boris is one of her two PCAs and she also receives 24/7 cares from the daughter, Keesha.    They acknowledged that they need guidance on her insulin dosing as well as more information on disease management.  Are open to diabetes education.    Diabetes Disease Summary:     Diabetes was diagnosed this last month with a recent hospitalization.  She has been undergoing medical procedures relating to her metastatic cancer.  States that she only recently became aware that she had an existing prescription for metformin, prescriber not identified. She was not aware that she had any problems with her glucose previously.      Family history is positive for diabetes on her mother side with multiple family members.    Teresa's PMH, PSH, allergies, pertinent social and family history were reviewed today and pertinent information is summarized below.    Problem List     Patient Active Problem List   Diagnosis    Breast mass    Spine metastasis    Urinary tract infection with hematuria    Malignant neoplasm of overlapping sites of left breast in female, estrogen receptor positive (H)    Carcinoma of left breast metastatic to bone (H)    Metastasis to bone (H)    Pain of right lower leg    Malignant neoplasm of female breast, unspecified estrogen receptor status, unspecified laterality, unspecified site of breast (H)    Schizoaffective disorder, bipolar type (H)    Insomnia, unspecified type    Cancer associated pain    Malignant neoplasm metastatic to bone (H)    Uncontrolled pain    Carcinoma of breast metastatic to bone, unspecified laterality (H)    FELTON (acute kidney injury)    Other closed fracture of fourth lumbar vertebra with delayed healing,  subsequent encounter        Medical / Surgical History     Past Medical History:   Diagnosis Date    Anxiety     Breast CA (H) 12/2020    presenting with bone mets    Depression     DM2 (diabetes mellitus, type 2) (H) 06/19/2025    DVT (deep venous thrombosis) (H) 2014    Hx of radiation therapy     Left breast mass     x approximately 4-5 months    Metastasis to bone (H)     Pathological fracture of vertebra due to malignant neoplasm metastatic to bone (H)     Prediabetes 2023    Pulmonary emboli (H)     Pyelonephritis     Schizoaffective disorder (H)     Tobacco use      Past Surgical History:   Procedure Laterality Date    COLONOSCOPY N/A 7/8/2022    Procedure: COLONOSCOPY, WITH POLYPECTOMY;  Surgeon: Ham Cano MD;  Location: [facility] OR    ESOPHAGOSCOPY, GASTROSCOPY, DUODENOSCOPY (EGD), COMBINED N/A 3/7/2025    Procedure: ESOPHAGOGASTRODUODENOSCOPY, WITH BIOPSY;  Surgeon: Nguyen Holliday MD;  Location: [facility] [place] OR    IR LUMBAR KYPHOPLASTY VERTEBRAE  5/17/2021    LAPAROSCOPIC SALPINGO-OOPHORECTOMY Bilateral 8/20/2021    Procedure: BILATERAL SALPINGO-OOPHORECTOMY, LAPAROSCOPIC;  Surgeon: Rory Lopez MD;  Location: [facility] [place] OR    OPTICAL TRACKING SYSTEM FUSION SPINE POSTERIOR LUMBAR THREE+ LEVELS N/A 7/7/2025    Procedure: o-arm/stealth assisted Lumbar 3-5 fusion with pedicle screws;  Surgeon: Modesta Goodrich MD;  Location: [facility] OR    TUBAL LIGATION  1998       Social History     Social History     Socioeconomic History    Marital status: Single     Spouse name: Not on file    Number of children: Not on file    Years of education: Not on file    Highest education level: Not on file   Occupational History    Not on file   Tobacco Use    Smoking status: Some Days     Current packs/day: 0.25     Average packs/day: 0.3 packs/day for 14.2 years (3.5 ttl pk-yrs)     Types: Cigarettes     Start date: 5/13/2011     Passive exposure: Current    Smokeless tobacco: Never    Tobacco comments:     6/19/25: Patient  states she has had 2 cigarettes in the last 48 hours. States she is not smoking daily but trying to quit.   Vaping Use    Vaping status: Never Used   Substance and Sexual Activity    Alcohol use: Not Currently     Comment: occ    Drug use: Not Currently     Types: Marijuana     Comment: occ    Sexual activity: Not Currently     Partners: Male   Other Topics Concern    Parent/sibling w/ CABG, MI or angioplasty before 65F 55M? Not Asked   Social History Narrative    Not on file     Social Drivers of Health     Financial Resource Strain: Low Risk  (7/11/2025)    Financial Resource Strain     Within the past 12 months, have you or your family members you live with been unable to get utilities (heat, electricity) when it was really needed?: No   Food Insecurity: High Risk (7/11/2025)    Food Insecurity     Within the past 12 months, did you worry that your food would run out before you got money to buy more?: Yes     Within the past 12 months, did the food you bought just not last and you didn t have money to get more?: Yes   Transportation Needs: Low Risk  (7/11/2025)    Transportation Needs     Within the past 12 months, has lack of transportation kept you from medical appointments, getting your medicines, non-medical meetings or appointments, work, or from getting things that you need?: No   Physical Activity: Insufficiently Active (3/4/2025)    Exercise Vital Sign     Days of Exercise per Week: 1 day     Minutes of Exercise per Session: 60 min   Stress: Stress Concern Present (3/4/2025)    [employer] [employer] - Occupational Stress Questionnaire     Feeling of Stress : Very much   Social Connections: Unknown (3/4/2025)    Social Connection and Isolation Panel [NHANES]     Frequency of Communication with Friends and Family: Not on file     Frequency of Social Gatherings with Friends and Family: Never     Attends Cheondoism Services: Not on file     Active Member of Clubs or Organizations: Not on  "file     Attends Club or Organization Meetings: Not on file     Marital Status: Not on file   Interpersonal Safety: Low Risk  (7/11/2025)    Interpersonal Safety     Do you feel physically and emotionally safe where you currently live?: Yes     Within the past 12 months, have you been hit, slapped, kicked or otherwise physically hurt by someone?: No     Within the past 12 months, have you been humiliated or emotionally abused in other ways by your partner or ex-partner?: No   Housing Stability: Low Risk  (7/11/2025)    Housing Stability     Do you have housing? : Yes     Are you worried about losing your housing?: No       Family History     Family History   Problem Relation Age of Onset    Lung Cancer Mother     Lung Cancer Father     Lupus Brother     Kidney Disease Brother     Diabetes Type 1 Daughter     Deep Vein Thrombosis (DVT) Daughter         provoked due to procedure and travel    Asthma Son     Cardiovascular Maternal Aunt     Diabetes Granddaughter     Hypertension Cousin     Diabetes Cousin     Anesthesia Reaction No family hx of        Most recent A1c:    Recent Labs   Lab Test 07/07/25  2114 06/19/25  1121   A1C 10.5* 8.6*        Diabetes Complications:     Microvascular:  Eye:  last eye exam: Needs referral,   Neuro:  paresthesias: no, vitamin B12 level was from No components found for: \"[id number]\"  Renal:  Most recent GFR data:   GFR Estimate   Date Value Ref Range Status   07/21/2025 80 >60 mL/min/1.73m2 Final     Comment:     eGFR calculated using 2021 CKD-EPI equation.   07/15/2025 >90 >60 mL/min/1.73m2 Final     Comment:     eGFR calculated using 2021 CKD-EPI equation.   07/14/2025 >90 >60 mL/min/1.73m2 Final     Comment:     eGFR calculated using 2021 CKD-EPI equation.   06/23/2021 88 >60 mL/min/[1.73_m2] Final     Comment:     Non  GFR Calc  Starting 12/18/2018, serum creatinine based estimated GFR (eGFR) will be   calculated using the Chronic Kidney Disease Epidemiology " Collaboration   (CKD-EPI) equation.     05/27/2021 59 (L) >60 mL/min/[1.73_m2] Final     Comment:     Non  GFR Calc  Starting 12/18/2018, serum creatinine based estimated GFR (eGFR) will be   calculated using the Chronic Kidney Disease Epidemiology Collaboration   (CKD-EPI) equation.     05/13/2021 >90 >60 mL/min/[1.73_m2] Final     Comment:     Non  GFR Calc  Starting 12/18/2018, serum creatinine based estimated GFR (eGFR) will be   calculated using the Chronic Kidney Disease Epidemiology Collaboration   (CKD-EPI) equation.       GFR, ESTIMATED POCT   Date Value Ref Range Status   07/01/2024 46 (L) >60 mL/min/1.73m2 Final   nephropathy no,   on ACE-I/ARB:  none  Macrovascular  Macrovascular:  history of CAD, CVA or PAD: smoking/tobacco use: yes, started as a social smoker up to 1 ppd, approximately 20 years.  On lipid-lowering med(s):  none  On ASA:  no  BP at goal:   Most recent blood pressure reading   07/23/25 104/69      Endocrine/Other:  Thyroid:   TSH   Date Value Ref Range Status   07/21/2025 1.98 0.30 - 4.20 uIU/mL Final   06/14/2022 2.05 0.40 - 4.00 mU/L Final   Sleep Apnea: no, On CPAP/BiPAP:    Liver: FIB-4 Calculation: 0.79 at 7/21/2025  2:12 AM  Calculated from:  SGOT/AST: 27 U/L, SGPT/ALT: 38 U/L, Platelets: 272 10e3/uL, Age: 49 years  Dental: Has an upper denture that does not currently fit, has partials on lower as well as her own teeth.  Has not been into see a dentist recently.  Mood/depression: Mood is up-and-down depending on the day.      Diabetes Management:     -Medication Management: lantus, Novolog, acknowledges that she has not been giving the insulin necessarily as it was prescribed at the time of hospital discharge, in fact she did not take her lantus this morning.  She and the sons admit to some confusion about the correction factor, and how much adjustments need to happen for her meals. Was giving her rapid insulin after the meals.   -Hypoglycemic, DKA  and/or hospitalizations related to diabetes: Describes an episode where she had fallen and questionably due to too much insulin on board.  -Physical activity: using walker for stability and safety  -Diet/snacking: generally tries to eat 3 meals/day, snack; avoids soda, coffee with flavored creamer  -Alcohol/other substance use: none  -Glucose monitoring: Fingerstick testing: randomly    7/22 10:00 pm 179   9:00 am 79  2:30 pm 115    7/21 9:30 157     7/20 9:30 pm 175   10:30 am 118    7/18 8:30 pm 126   1:30 pm 222  9:30 am 96    7/17 9:30 pm 130  8:30 pm 166  1:00 pm 148   9:30 am 133        Review of Systems:    Complete 10 point review of systems is negative except for what mentioned above.    Allergies     Allergies   Allergen Reactions    Contrast Dye      Pt developed nausea after isovue 370 injection on 6/9/21        Medications     Current Outpatient Medications   Medication Sig Dispense Refill    acetaminophen (TYLENOL) 325 MG tablet Take 3 tablets (975 mg) by mouth every 8 hours. 30 tablet 0    bisacodyl (DULCOLAX) 10 MG suppository Place 1 suppository (10 mg) rectally daily as needed for constipation (Use if magnesium hydroxide (MILK of MAGNESIA) is not effective after 24 hours. May discontinue if patient having bowel movement.). 4 suppository 0    blood glucose (NO BRAND SPECIFIED) lancets standard Use to test blood sugar 4x a day as directed. 100 each 0    blood glucose (NO BRAND SPECIFIED) lancets standard Use to test blood sugar 2 times daily or as directed. 100 each 1    blood glucose (NO BRAND SPECIFIED) test strip Use to test blood sugar 4x a day as directed. 100 strip 0    blood glucose (NO BRAND SPECIFIED) test strip Use to test blood sugar 2 times daily or as directed. 100 strip 1    blood glucose monitoring (NO BRAND SPECIFIED) meter device kit Use to test blood sugar 2 times daily or as directed. 1 kit 0    Blood Glucose Monitoring Suppl (CONTOUR BLOOD GLUCOSE SYSTEM) w/Device KIT Utilize for  close blood glucose monitoring. Test 4x a day as directed. 1 kit 0    Buprenorphine HCl (BELBUCA) 600 MCG FILM buccal film Place 1 Film (600 mcg) inside cheek every 12 hours. 30 Film 0    Continuous Glucose Sensor (FREESTYLE ROSARIO 3 PLUS SENSOR) MISC Change every 15 days. 6 each 4    famotidine (PEPCID) 20 MG tablet Take 1 tablet (20 mg) by mouth 2 times daily. 60 tablet 0    fulvestrant (FASLODEX) 250 MG/5ML injection Inject 500 mg into the muscle every 28 days.      gabapentin (NEURONTIN) 100 MG capsule Take 1 capsule (100 mg) by mouth 2 times daily. 120 capsule 0    gabapentin (NEURONTIN) 300 MG capsule Take 1 capsule (300 mg) by mouth at bedtime. 60 capsule 0    hydrOXYzine HCl (ATARAX) 50 MG tablet Take 1 tablet (50 mg) by mouth 4 times daily as needed for anxiety or other (panic). 30 tablet 0    insulin glargine (LANTUS PEN) 100 UNIT/ML pen Inject 50 Units subcutaneously every morning. 15 mL 0    insulin lispro (HUMALOG KWIKPEN) 100 UNIT/ML (1 unit dial) KWIKPEN Inject 30 Units subcutaneously 3 times daily (before meals). Take 30 units before Large meals that contain carbohydrates (150 -170 grams of carbs) Take 20 units before average meals or snacks that contain carbohydrates (100 -120 grams of carbs)  Take 10 units before small meals that contain carbohydrates (50 -70 grams of carbs) 81 mL 0    insulin pen needle (32G X 4 MM) 32G X 4 MM miscellaneous Use 4x a day for insulin checks as directed. 100 each 0    Lidocaine (LIDOCARE) 4 % Patch Place 2 patches over 12 hours onto the skin every 24 hours. To prevent lidocaine toxicity, patient should be patch free for 12 hrs daily. 30 patch 0    magic mouthwash suspension (diphenhydrAMINE, lidocaine, aluminum-magnesium & simethicone) Swish and swallow 10 mLs in mouth every 6 hours as needed for mouth sores. 120 mL 1    magnesium hydroxide (MILK OF MAGNESIA) 400 MG/5ML suspension Take 30 mLs by mouth daily as needed for constipation (Use if polyethylene glycol  (Miralax) is not effective after 24 hours.). 354 mL 0    methocarbamol (ROBAXIN) 500 MG tablet Take 0.5 tablets (250 mg) by mouth 4 times daily as needed for muscle spasms. 20 tablet 0    naloxone (NARCAN) 4 MG/0.1ML nasal spray Spray 1 spray (4 mg) into one nostril alternating nostrils as needed for opioid reversal. every 2-3 minutes until assistance arrives 2 each 0    nicotine (NICODERM CQ) 14 MG/24HR 24 hr patch Place 1 patch over 24 hours onto the skin every 24 hours. 30 patch 0    OLANZapine (ZYPREXA) 5 MG tablet Take 1 tablet (5 mg) by mouth nightly as needed (difficulty sleeping). 30 tablet 0    ondansetron (ZOFRAN ODT) 4 MG ODT tab Take 1 tablet (4 mg) by mouth every 6 hours as needed for nausea or vomiting. 30 tablet 0    oxyCODONE (ROXICODONE) 5 MG tablet Take 1 tablet (5 mg) by mouth every 4 hours as needed for moderate pain. 20 tablet 0    polyethylene glycol (MIRALAX) 17 GM/Dose powder Take 17 g by mouth daily. 850 g 0    prochlorperazine (COMPAZINE) 10 MG tablet Take 1 tablet (10 mg) by mouth every 6 hours as needed for nausea or vomiting. 15 tablet 0    QUEtiapine (SEROQUEL) 400 MG tablet Take 1 tablet (400 mg) by mouth at bedtime. 30 tablet 0    rivaroxaban ANTICOAGULANT (XARELTO) 20 MG TABS tablet Take 1 tablet (20 mg) by mouth daily (with dinner). 90 tablet 3    senna-docusate (SENOKOT-S/PERICOLACE) 8.6-50 MG tablet Take 2 tablets by mouth 2 times daily. 20 tablet 0    sertraline (ZOLOFT) 100 MG tablet Take 1 tablet (100 mg) by mouth every morning. 30 tablet 0    Vitamin D3 (CHOLECALCIFEROL) 25 mcg (1000 units) tablet Take 1 tablet (25 mcg) by mouth daily. 90 tablet 3    everolimus (AFINITOR) 10 MG tablet Take 1 tablet (10 mg) by mouth daily for 28 days Avoid grapefruit and grapefruit juice. Take with or without food, but should be consistent. 28 tablet 0    [Paused] metFORMIN (GLUCOPHAGE) 500 MG tablet Take 1 tablet (500 mg) by mouth 2 times daily (with meals). 60 tablet 1     No current  "facility-administered medications for this visit.        Physical Exam:     /69   Pulse 78   Wt 105.2 kg (232 lb)   SpO2 99%   BMI 31.46 kg/m    Wt Readings from Last 10 Encounters:   07/23/25 105.2 kg (232 lb)   07/21/25 105.2 kg (232 lb)   07/15/25 110.3 kg (243 lb 3.2 oz)   06/23/25 106.1 kg (234 lb)   06/20/25 109.3 kg (241 lb)   06/19/25 108.1 kg (238 lb 6.4 oz)   05/27/25 109.5 kg (241 lb 6.4 oz)   05/22/25 110.2 kg (243 lb)   05/13/25 113.9 kg (251 lb)   04/24/25 113 kg (249 lb 1.6 oz)     Estimated body mass index is 31.46 kg/m  as calculated from the following:    Height as of 7/15/25: 1.829 m (6').    Weight as of this encounter: 105.2 kg (232 lb).    General: Pleasant, well nourished and hydrated female in NAD.   Psych:  Mood is \"good,\" affect is appropriate.  Thought form and content are fluid and coherent.    HEENT: Eyes and sclera are clear. No lid lag, retraction. Extraocular movements are grossly intact without proptosis.  Nares are patent, mucous membranes moist. Dentition: Missing upper teeth  Resp: Easy and unlabored breathing, no wheezing, crepitus or rhonci.   Neuro: Alert and oriented, communicating clearly. Grossly intact, no focal deficits         Labs:       Recent Labs   Lab Test 07/21/25  1008 07/21/25  0212 07/21/25  0159 07/21/25  0022 07/15/25  0629 07/11/25  0647 07/10/25  1257 07/08/25  0547 07/07/25  2114 06/20/25  1215 06/19/25  1121 05/22/25  1353 04/24/25  1455 09/11/24  1053 09/11/24  1052 10/12/21  0940 09/13/21  1242   A1C  --   --   --   --   --   --   --   --  10.5*  --  8.6*  --   --    < >  --    < >  --    TSH  --   --  1.98 2.60  --   --   --   --   --   --   --   --   --   --   --    < > 0.18*   T4  --   --   --   --   --   --   --   --   --   --   --   --   --   --   --   --  1.14   CHOL  --   --   --   --   --   --   --   --   --   --   --   --  204*  --  177   < >  --    LDL  --   --   --   --   --   --   --   --   --   --   --   --  116*  --  81   < >  --  "   HDL  --   --   --   --   --   --   --   --   --   --   --   --  69  --  74   < >  --    TRIG  --   --   --   --   --   --   --   --   --   --   --   --  93  --  110   < >  --    NA  --   --   --  140 139   < > 137   < >  --    < > 141   < > 139   < > 142   < > 144   CHLORIDE  --   --   --  108* 101   < > 101   < >  --    < > 105   < > 104   < > 108*   < > 113*   CO2  --   --   --  20* 26   < > 26   < >  --    < > 23   < > 27   < > 23   < > 26   ALBUMIN  --   --   --  3.1*  --   --  3.4*  --   --   --  3.7   < > 4.0   < > 3.9   < > 3.3*   CR  --   --   --  0.88 0.72   < > 0.63   < >  --    < > 0.91   < > 0.81   < > 1.13*   < > 0.84   MICROL <12.0  --   --   --   --   --   --   --   --   --   --   --   --   --   --   --   --    AST  --   --   --  27  --   --  56*  --   --   --  42   < > 52*   < > 17   < > 13   ALT  --   --   --  38  --   --  40  --   --   --  48   < > 122*   < > 11   < > 24   HGB  --  7.3*  --   --  8.2*   < > 8.9*   < >  --    < > 10.1*   < > 9.8*   < >  --    < > 10.6*    < > = values in this interval not displayed.       Lab Results   Component Value Date    GFR Estimate 80 07/21/2025    GFR Estimate >90 07/15/2025    GFR Estimate >90 07/14/2025    GFR Estimate >90 07/13/2025    GFR Estimate >90 07/12/2025    GFR Estimate 88 06/23/2021    GFR Estimate 59 05/27/2021    GFR Estimate >90 05/13/2021    GFR Estimate 88 04/20/2021    GFR Estimate >90 03/25/2021    GFR, ESTIMATED POCT 46 07/01/2024     Lab Results   Component Value Date    Albumin Urine mg/g Cr  07/21/2025      Comment:      Unable to calculate, urine albumin and/or urine creatinine is outside detectable limits.  Microalbuminuria is defined as an albumin:creatinine ratio of 17 to 299 for males and 25 to 299 for females. A ratio of albumin:creatinine of 300 or higher is indicative of overt proteinuria.  Due to biologic variability, positive results should be confirmed by a second, first-morning random or 24-hour timed urine specimen. If  there is discrepancy, a third specimen is recommended. When 2 out of 3 results are in the microalbuminuria range, this is evidence for incipient nephropathy and warrants increased efforts at glucose control, blood pressure control, and institution of therapy with an angiotensin-converting-enzyme (ACE) inhibitor (if the patient can tolerate it).       FIB-4 Calculation: 0.79 at 7/21/2025  2:12 AM      Assessment & Recommendations:         ICD-10-CM    1. New onset type 2 diabetes mellitus (H)  E11.9 Albumin Random Urine Quantitative with Creat Ratio     Hemoglobin A1c     Lipid Profile     TSH with free T4 reflex     Adult Endocrinology  Referral     Continuous Glucose Sensor (FREESTYLE ROSARIO 3 PLUS SENSOR) [facility]     Adult Eye  Referral     Adult Diabetes Education  Referral          Reviewed Teresa's current diabetes management plan in conjunction with their  and discussed opportunities for improved glycemic control. Patient needing education and supports for disease management.  Advised today on her insulin dosing and CF that was established from her hospitalization and emphasized that dosing of rapid insulin to occur 10-15 minutes prior to meals.  AND only to be taken when eating.  Advised that she would benefit from use of a CGM given erratic fingerstick testing and the amount of insulin that is being administered.  A sensor was placed today in office by our nursing personnel.   Start Freestyle Rosario 3+ continuous glucose monitor - an order will be submitted to [facility] pharmacy   Referral for diabetes education to assist with insulin adjustment  Referral for eye appointment  Labs ordered  Recheck with Pat in 3-4 weeks time  Provided patient and family with insulin adjustment directions from 7/7/25 hospital discharge    -Complication Management:  Microvascular: BP controlled, will need foot exam at future appointment due to time constraints today, needs updated albumin/creatinine  ratio, referred for an eye exam  Macrovascular: smokes erratically, no statin use presently, needs lipid panel completed  Endocrine/Other: needs updated TSH    Patient understands and agrees with the above plan.     68 minutes spent on the date of the encounter doing chart review, history and exam, documentation, education and counseling, as well as communication and coordination of care, and further activities as noted above.  This time excludes time spent reviewing CGM.    It is my privilege to be involved in the care of the above patient.     Socorro He PA-C, MPAS, MPH  [facility]        Diabetes, Endocrinology, and Metabolism      [phone] Appointments        This note was completed in part using Dragon voice recognition, and may contain word and grammatical errors.   No

## 2025-07-23 NOTE — PROGRESS NOTES
Neurosurgery Clinic Note    Chief Complaint: surgical follow-up     DATE OF VISIT: 7/24/2025    HPI: Teresa Sanderson is a pleasant 49 year old female who is s/p L3-5 PSF with Dr Goodrich on 7/7/2025.  Patient underwent above-mentioned procedure on 7/7/2025.  Following the procedure the patient was transferred to the floor.  The patient's course was complicated by elevated glucoses requiring endocrinology consult and initiation of type 2 DM medication regimen, as well as a rapid response called 7/10/2025 for transient decreased LOC, with immediate return to baseline when patient stimulated.  Patient underwent post operative upright x-ray of complete spine which demonstrated expected post-operative changes from L3-5 posterior fusion. On post operative day 8, she was ambulating, voiding without a torres, eating a regular diet, pain was well controlled and therefore she was deemed medically ready for discharge.  Patient's PTA xarelto and chemotherapy was held until POD #14.   Patient missed her follow-up appointment for suture removal due to an ED visit on 7/21/2025,  A CT of the lumbar spine was obtained which demonstrated a nonspecific fluid collection dorsal to the surgical site, likely a seroma. Her hardware was intact with no evidence of loosening. She does not have any weakness, numbness, change in bowel or bladder function, or other neurologic symptoms. Her incision remains clean, dry and intact. She has not had any leak from the incision.    Currently, complains of back pain around the incision site, no leg pain.  Complains of urinary urgency however no bowel dysfunction.  Taking oxycodone, tylenol, gabapentin, and robaxin for pain.  States she is tolerating ADLs but feels deconditioned.  States the pain is getting better every day.  No trauma or injury since surgery.   Patient  denies any fevers, chills, wound drainage, or other signs of infection from the incision site.        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)     Prediabetes 2023    Pulmonary emboli (H)     Pyelonephritis     Schizoaffective disorder (H)     Tobacco use        Past Surgical History:   Procedure Laterality Date    COLONOSCOPY N/A 7/8/2022    Procedure: COLONOSCOPY, WITH POLYPECTOMY;  Surgeon: Ham Cano MD;  Location: [facility] OR    ESOPHAGOSCOPY, GASTROSCOPY, DUODENOSCOPY (EGD), COMBINED N/A 3/7/2025    Procedure: ESOPHAGOGASTRODUODENOSCOPY, WITH BIOPSY;  Surgeon: Nguyen Holliday MD;  Location: [facility] OR    IR LUMBAR KYPHOPLASTY VERTEBRAE  5/17/2021    LAPAROSCOPIC SALPINGO-OOPHORECTOMY Bilateral 8/20/2021    Procedure: BILATERAL SALPINGO-OOPHORECTOMY, LAPAROSCOPIC;  Surgeon: Rory Lopez MD;  Location: [facility] OR    OPTICAL TRACKING SYSTEM FUSION SPINE POSTERIOR LUMBAR THREE+ LEVELS N/A 7/7/2025    Procedure: o-arm/stealth assisted Lumbar 3-5 fusion with pedicle screws;  Surgeon: Modesta Goodrcih MD;  Location: [facility] OR    TUBAL LIGATION  1998       Social History     Socioeconomic History    Marital status: Single   Tobacco Use    Smoking status: Some Days     Current packs/day: 0.25     Average packs/day: 0.3 packs/day for 14.2 years (3.5 ttl pk-yrs)     Types: Cigarettes     Start date: 5/13/2011     Passive exposure: Current    Smokeless tobacco: Never    Tobacco comments:     6/19/25: Patient states she has had 2 cigarettes in the last 48 hours. States she is not smoking daily but trying to quit.   Vaping Use    Vaping status: Never Used   Substance and Sexual Activity    Alcohol use: Not Currently     Comment: occ    Drug  use: Not Currently     Types: Marijuana     Comment: occ    Sexual activity: Not Currently     Partners: Male     Social Drivers of Health     Financial Resource Strain: Low Risk  (7/11/2025)    Financial Resource Strain     Within the past 12 months, have you or your family members you live with been unable to get utilities (heat, electricity) when it was really needed?: No   Food Insecurity: High Risk (7/11/2025)    Food Insecurity     Within the past 12 months, did you worry that your food would run out before you got money to buy more?: Yes     Within the past 12 months, did the food you bought just not last and you didn t have money to get more?: Yes   Transportation Needs: Low Risk  (7/11/2025)    Transportation Needs     Within the past 12 months, has lack of transportation kept you from medical appointments, getting your medicines, non-medical meetings or appointments, work, or from getting things that you need?: No   Physical Activity: Insufficiently Active (3/4/2025)    Exercise Vital Sign     Days of Exercise per Week: 1 day     Minutes of Exercise per Session: 60 min   Stress: Stress Concern Present (3/4/2025)    [employer] - Occupational Stress Questionnaire     Feeling of Stress : Very much   Social Connections: Unknown (3/4/2025)    Social Connection and Isolation Panel [NHANES]     Frequency of Social Gatherings with Friends and Family: Never   Interpersonal Safety: Low Risk  (7/11/2025)    Interpersonal Safety     Do you feel physically and emotionally safe where you currently live?: Yes     Within the past 12 months, have you been hit, slapped, kicked or otherwise physically hurt by someone?: No     Within the past 12 months, have you been humiliated or emotionally abused in other ways by your partner or ex-partner?: No   Housing Stability: Low Risk  (7/11/2025)    Housing Stability     Do you have housing? : Yes     Are you worried about losing your housing?: No        family history includes Asthma in her son; Cardiovascular in her maternal aunt; Deep Vein Thrombosis (DVT) in her daughter; Diabetes in her cousin and granddaughter; Diabetes Type 1 in her daughter; Hypertension in her cousin; Kidney Disease in her brother; Lung Cancer in her father and mother; Lupus in her brother.              Physical Exam   There were no vitals taken for this visit.  Constitutional: Oriented to person, place, and time. Appears well-developed and well-nourished. Cooperative. No distress.   HENT: Head normocephalic and atraumatic.     Incision:  Clean, dry and intact.  No erythema, induration or fluctuance.  No drainage noted.  Neurological: alert and oriented to person, place, and time. CN II-XII grossly  intact      STRENGTH LEFT RIGHT   Deltoid 5 5   Bicep 5 5   Wrist Extensor 5 5   Tricep 5 5   Finger flexion 5 5   Finger abduction 5 5    5 5       Hip Flexion     4-     5   Knee Extension 4+ 5   Ankle Dorsiflexion 5 5   Extensor Hallucis Longus 5 5   Plantar Flexion 4+ 5   Foot eversion 5 5   Foot inversion 5 5       Skin: Skin is warm, dry and intact.   Psychiatric: Normal mood and affect. Speech is normal and behavior is normal.      IMAGING:  No imaging available for review today     ASSESSMENT:  Teresa Sanderson is a 49 year old female s/p L3-5 PSF with Dr Goodrich on 7/7/2025.    PLAN:  Do not do any bending, twisting, strenuous exercise, or heavy lifting (greater than 10 pounds) for 4-6 weeks. Be careful and ask for assistance when walking or going up and down stairs. Avoid any activities that could result in trauma to the surgical wound. Do not drive while using narcotics or other sedating medications, such as sleep aids, muscle relaxants, etc.    Ok to shower, however do not submerge wound in water (no bath tubs, hot tubs, pools, lakes, rivers).     Please refrain from using tobacco products of any form.   Do not use alcohol or other sedating substances while taking  prescription pain medications.      Please do not take any NSAIDs (advil, aleve, motrin, ibuprofen, celebrex, etc) as they inhibit bony fusion.    If you should sustain new trauma or injury or note increased headache, weakness, speech or vision issues, confusion, or other neurologic changes please call 911 or present to your nearest emergency room for further evaluation.     Please continue to abstain from medications that may thin your blood.     Please follow-up with Renetta Miranda PA-C in 4 weeks with x-rays prior to visit.                I spent 30 minutes in patient care with greater than 50% spent in counseling and/or coordination of care.     I performed independent visualization of radiographic imaging and entered my own interpretation, reviewed and/or ordered tests in radiology        RENO Lua, CNP  Department of Neurosurgery  Pager: [phone]

## 2025-07-23 NOTE — PATIENT INSTRUCTIONS
Start Freestyle Antonio 3+ continuous glucose monitor - an order will be submitted to [place] [facility]   Referral for diabetes education to assist with insulin adjustment  Referral for eye appointment  Labs ordered  Recheck with Pat in 3-4 weeks time      Insulin instructions from 7/7/25 hospitalization - we will be using these directions until you meet with the educator.      Glucose Control Regimen:      1) Long-acting insulin: glargine (Lantus) - take 50 units once daily at 10 am. Take at same time each day.     2) Rapid-acting insulin: aspart (Novolog) carbohydrate coverage/mealtime insulin -      Take 30 units before Large meals that contain carbohydrates (150-200 grams of carbs)  Take 20 units before average meals or snacks that contain carbohydrates (100-120 grams of carbs)   Take 10 units before small meals that contain carbohydrates (50-70 grams of carbs)     4) Rapid-acting insulin: aspart (Novolog) correction - see chart below. Take for high blood glucoses three times daily before meals and at bedtime.   You may add the correction dose to the carbohydrate coverage/mealtime insulin dose and give in one injection--ideally 10-15 minutes before a meal.  You may take the correction dose even if you skip a meal (as long as it has been 4 hours since previous correction dose).      Pre-meal correction scale:     Blood Glucose Insulin Novolog Before Meals:   Less than 140 0 units   140 -164  1 units   165 -189 2 units   190 - 214 3 units   215 - 239 4 units    240 - 264  5 units   265 - 289 6 units   290 - 314 7 units   315 - 339 8 units   340 - 364 9 units   365 or more 10 units         Your target A1c value is less than 7% to help prevent future complications from diabetes. Your most recent A1c is 10.5%.

## 2025-07-23 NOTE — NURSING NOTE
Chief Complaint   Patient presents with    Diabetes     /69   Pulse 78   Wt 105.2 kg (232 lb)   SpO2 99%   BMI 31.46 kg/m

## 2025-07-23 NOTE — TELEPHONE ENCOUNTER
[facility] Call Center    Phone Message    May a detailed message be left on voicemail: yes     Reason for Call: Orders-  add a Medical SW to case week of 7/28/2025   ok to leave a message on his confidential VM.    Action Taken: Message routed to:  [facility] P[facility]    Travel Screening: Not Applicable     Date of Service:

## 2025-07-23 NOTE — TELEPHONE ENCOUNTER
[facility] Call Center    Phone Message    May a detailed message be left on voicemail: yes     Reason for Call: Order(s): Home Care Orders: Occupational Therapy (OT): once a week for three weeks, then every other week for four weeks.     Action Taken: pcc    Travel Screening: Not Applicable     Date of Service:

## 2025-07-23 NOTE — TELEPHONE ENCOUNTER
Left detailed VM on confidential voicemail box for Viry with [facility] with the following verbal order(s): Home Care Orders: Occupational Therapy (OT): once a week for three weeks, then every other week for four weeks.   Zahra DAWKINS LPN  [facility]

## 2025-07-23 NOTE — PROGRESS NOTES
Neuroscience Clinic Task Note    TASK    Return Call  Date/time 7/23/2025 at 10:57 AM   Reason for call Schedule appointment for suture removal      Left voice mail for patient and sent [employer] message to inform;  Scheduled patient for suture removal per [facility] for Dr. Goodrich.     FOLLOW-UP    Requested patient return call or respond to [employer] message to confirm appointment scheduled for suture removal.        ADDITIONAL COMMENTS       Rut Morfin LPN

## 2025-07-23 NOTE — LETTER
7/23/2025       RE: Teresa Sanderson  [address]  [facility] [address]     Dear Colleague,    Thank you for referring your patient, Teresa Sanderson, to the [facility] ENDOCR[facility] [place] at [facility]. Please see a copy of my visit note below.           Diabetes Consult Note  Jul 23, 2025      History of Present Illness     Teresa Sanderson is a 49 year old female who is seen for diabetes mellitus upon referral Nesha Koenig APRN, CNS.      She is accompanied to today's appointment by her adult sons, Uzair and Boris.  Boris is one of her two PCAs and she also receives 24/7 cares from the daughter, Keesha.    They acknowledged that they need guidance on her insulin dosing as well as more information on disease management.  Are open to diabetes education.    Diabetes Disease Summary:     Diabetes was diagnosed this last month with a recent hospitalization.  She has been undergoing medical procedures relating to her metastatic cancer.  States that she only recently became aware that she had an existing prescription for metformin, prescriber not identified. She was not aware that she had any problems with her glucose previously.      Family history is positive for diabetes on her mother side with multiple family members.    Teresa's PMH, PSH, allergies, pertinent social and family history were reviewed today and pertinent information is summarized below.    Problem List     Patient Active Problem List   Diagnosis     Breast mass     Spine metastasis     Urinary tract infection with hematuria     Malignant neoplasm of overlapping sites of left breast in female, estrogen receptor positive (H)     Carcinoma of left breast metastatic to bone (H)     Metastasis to bone (H)     Pain of right lower leg     Malignant neoplasm of female breast, unspecified estrogen receptor status, unspecified laterality, unspecified site of breast (H)      Schizoaffective disorder, bipolar type (H)     Insomnia, unspecified type     Cancer associated pain     Malignant neoplasm metastatic to bone (H)     Uncontrolled pain     Carcinoma of breast metastatic to bone, unspecified laterality (H)     FELTON (acute kidney injury)     Other closed fracture of fourth lumbar vertebra with delayed healing, subsequent encounter        Medical / Surgical History     Past Medical History:   Diagnosis Date     Anxiety      Breast CA (H) 12/2020    presenting with bone mets     Depression      DM2 (diabetes mellitus, type 2) (H) 06/19/2025     DVT (deep venous thrombosis) (H) 2014     Hx of radiation therapy      Left breast mass     x approximately 4-5 months     Metastasis to bone (H)      Pathological fracture of vertebra due to malignant neoplasm metastatic to bone (H)      Prediabetes 2023     Pulmonary emboli (H)      Pyelonephritis      Schizoaffective disorder (H)      Tobacco use      Past Surgical History:   Procedure Laterality Date     COLONOSCOPY N/A 7/8/2022    Procedure: COLONOSCOPY, WITH POLYPECTOMY;  Surgeon: Ham Cano MD;  Location: [facility] OR     ESOPHAGOSCOPY, GASTROSCOPY, DUODENOSCOPY (EGD), COMBINED N/A 3/7/2025    Procedure: ESOPHAGOGASTRODUODENOSCOPY, WITH BIOPSY;  Surgeon: Nguyen Holliday MD;  Location: [facility] OR     IR LUMBAR KYPHOPLASTY VERTEBRAE  5/17/2021     LAPAROSCOPIC SALPINGO-OOPHORECTOMY Bilateral 8/20/2021    Procedure: BILATERAL SALPINGO-OOPHORECTOMY, LAPAROSCOPIC;  Surgeon: Rory Lopez MD;  Location: [facility] OR     OPTICAL TRACKING SYSTEM FUSION SPINE POSTERIOR LUMBAR THREE+ LEVELS N/A 7/7/2025    Procedure: o-arm/stealth assisted Lumbar 3-5 fusion with pedicle screws;  Surgeon: Modesta Goodrich MD;  Location: [facility] OR     TUBAL LIGATION  1998       Social History     Social History     Socioeconomic History     Marital status: Single     Spouse name: Not on file     Number of children: Not on file     Years of education: Not on file      Highest education level: Not on file   Occupational History     Not on file   Tobacco Use     Smoking status: Some Days     Current packs/day: 0.25     Average packs/day: 0.3 packs/day for 14.2 years (3.5 ttl pk-yrs)     Types: Cigarettes     Start date: 5/13/2011     Passive exposure: Current     Smokeless tobacco: Never     Tobacco comments:     6/19/25: Patient states she has had 2 cigarettes in the last 48 hours. States she is not smoking daily but trying to quit.   Vaping Use     Vaping status: Never Used   Substance and Sexual Activity     Alcohol use: Not Currently     Comment: occ     Drug use: Not Currently     Types: Marijuana     Comment: occ     Sexual activity: Not Currently     Partners: Male   Other Topics Concern     Parent/sibling w/ CABG, MI or angioplasty before 65F 55M? Not Asked   Social History Narrative     Not on file     Social Drivers of Health     Financial Resource Strain: Low Risk  (7/11/2025)    Financial Resource Strain      Within the past 12 months, have you or your family members you live with been unable to get utilities (heat, electricity) when it was really needed?: No   Food Insecurity: High Risk (7/11/2025)    Food Insecurity      Within the past 12 months, did you worry that your food would run out before you got money to buy more?: Yes      Within the past 12 months, did the food you bought just not last and you didn t have money to get more?: Yes   Transportation Needs: Low Risk  (7/11/2025)    Transportation Needs      Within the past 12 months, has lack of transportation kept you from medical appointments, getting your medicines, non-medical meetings or appointments, work, or from getting things that you need?: No   Physical Activity: Insufficiently Active (3/4/2025)    Exercise Vital Sign      Days of Exercise per Week: 1 day      Minutes of Exercise per Session: 60 min   Stress: Stress Concern Present (3/4/2025)    [employer] -  "Occupational Stress Questionnaire      Feeling of Stress : Very much   Social Connections: Unknown (3/4/2025)    Social Connection and Isolation Panel [NHANES]      Frequency of Communication with Friends and Family: Not on file      Frequency of Social Gatherings with Friends and Family: Never      Attends Yarsanism Services: Not on file      Active Member of Clubs or Organizations: Not on file      Attends Club or Organization Meetings: Not on file      Marital Status: Not on file   Interpersonal Safety: Low Risk  (7/11/2025)    Interpersonal Safety      Do you feel physically and emotionally safe where you currently live?: Yes      Within the past 12 months, have you been hit, slapped, kicked or otherwise physically hurt by someone?: No      Within the past 12 months, have you been humiliated or emotionally abused in other ways by your partner or ex-partner?: No   Housing Stability: Low Risk  (7/11/2025)    Housing Stability      Do you have housing? : Yes      Are you worried about losing your housing?: No       Family History     Family History   Problem Relation Age of Onset     Lung Cancer Mother      Lung Cancer Father      Lupus Brother      Kidney Disease Brother      Diabetes Type 1 Daughter      Deep Vein Thrombosis (DVT) Daughter         provoked due to procedure and travel     Asthma Son      Cardiovascular Maternal Aunt      Diabetes Granddaughter      Hypertension Cousin      Diabetes Cousin      Anesthesia Reaction No family hx of        Most recent A1c:    Recent Labs   Lab Test 07/07/25  2114 06/19/25  1121   A1C 10.5* 8.6*        Diabetes Complications:     Microvascular:  Eye:  last eye exam: Needs referral,   Neuro:  paresthesias: no, vitamin B12 level was from No components found for: \"[id number]\"  Renal:  Most recent GFR data:   GFR Estimate   Date Value Ref Range Status   07/21/2025 80 >60 mL/min/1.73m2 Final     Comment:     eGFR calculated using 2021 CKD-EPI equation.   07/15/2025 >90 >60 " mL/min/1.73m2 Final     Comment:     eGFR calculated using 2021 CKD-EPI equation.   07/14/2025 >90 >60 mL/min/1.73m2 Final     Comment:     eGFR calculated using 2021 CKD-EPI equation.   06/23/2021 88 >60 mL/min/[1.73_m2] Final     Comment:     Non  GFR Calc  Starting 12/18/2018, serum creatinine based estimated GFR (eGFR) will be   calculated using the Chronic Kidney Disease Epidemiology Collaboration   (CKD-EPI) equation.     05/27/2021 59 (L) >60 mL/min/[1.73_m2] Final     Comment:     Non  GFR Calc  Starting 12/18/2018, serum creatinine based estimated GFR (eGFR) will be   calculated using the Chronic Kidney Disease Epidemiology Collaboration   (CKD-EPI) equation.     05/13/2021 >90 >60 mL/min/[1.73_m2] Final     Comment:     Non  GFR Calc  Starting 12/18/2018, serum creatinine based estimated GFR (eGFR) will be   calculated using the Chronic Kidney Disease Epidemiology Collaboration   (CKD-EPI) equation.       GFR, ESTIMATED POCT   Date Value Ref Range Status   07/01/2024 46 (L) >60 mL/min/1.73m2 Final   nephropathy no,   on ACE-I/ARB:  none  Macrovascular  Macrovascular:  history of CAD, CVA or PAD: smoking/tobacco use: yes, started as a social smoker up to 1 ppd, approximately 20 years.  On lipid-lowering med(s):  none  On ASA:  no  BP at goal:   Most recent blood pressure reading   07/23/25 104/69      Endocrine/Other:  Thyroid:   TSH   Date Value Ref Range Status   07/21/2025 1.98 0.30 - 4.20 uIU/mL Final   06/14/2022 2.05 0.40 - 4.00 mU/L Final   Sleep Apnea: no, On CPAP/BiPAP:    Liver: FIB-4 Calculation: 0.79 at 7/21/2025  2:12 AM  Calculated from:  SGOT/AST: 27 U/L, SGPT/ALT: 38 U/L, Platelets: 272 10e3/uL, Age: 49 years  Dental: Has an upper denture that does not currently fit, has partials on lower as well as her own teeth.  Has not been into see a dentist recently.  Mood/depression: Mood is up-and-down depending on the day.      Diabetes Management:      -Medication Management: lantus, Novolog, acknowledges that she has not been giving the insulin necessarily as it was prescribed at the time of hospital discharge, in fact she did not take her lantus this morning.  She and the sons admit to some confusion about the correction factor, and how much adjustments need to happen for her meals. Was giving her rapid insulin after the meals.   -Hypoglycemic, DKA and/or hospitalizations related to diabetes: Describes an episode where she had fallen and questionably due to too much insulin on board.  -Physical activity: using walker for stability and safety  -Diet/snacking: generally tries to eat 3 meals/day, snack; avoids soda, coffee with flavored creamer  -Alcohol/other substance use: none  -Glucose monitoring: Fingerstick testing: randomly    7/22 10:00 pm 179   9:00 am 79  2:30 pm 115    7/21 9:30 157     7/20 9:30 pm 175   10:30 am 118    7/18 8:30 pm 126   1:30 pm 222  9:30 am 96    7/17 9:30 pm 130  8:30 pm 166  1:00 pm 148   9:30 am 133        Review of Systems:    Complete 10 point review of systems is negative except for what mentioned above.    Allergies     Allergies   Allergen Reactions     Contrast Dye      Pt developed nausea after isovue 370 injection on 6/9/21        Medications     Current Outpatient Medications   Medication Sig Dispense Refill     acetaminophen (TYLENOL) 325 MG tablet Take 3 tablets (975 mg) by mouth every 8 hours. 30 tablet 0     bisacodyl (DULCOLAX) 10 MG suppository Place 1 suppository (10 mg) rectally daily as needed for constipation (Use if magnesium hydroxide (MILK of MAGNESIA) is not effective after 24 hours. May discontinue if patient having bowel movement.). 4 suppository 0     blood glucose (NO BRAND SPECIFIED) lancets standard Use to test blood sugar 4x a day as directed. 100 each 0     blood glucose (NO BRAND SPECIFIED) lancets standard Use to test blood sugar 2 times daily or as directed. 100 each 1     blood glucose (NO  BRAND SPECIFIED) test strip Use to test blood sugar 4x a day as directed. 100 strip 0     blood glucose (NO BRAND SPECIFIED) test strip Use to test blood sugar 2 times daily or as directed. 100 strip 1     blood glucose monitoring (NO BRAND SPECIFIED) meter device kit Use to test blood sugar 2 times daily or as directed. 1 kit 0     Blood Glucose Monitoring Suppl (CONTOUR BLOOD GLUCOSE SYSTEM) w/Device KIT Utilize for close blood glucose monitoring. Test 4x a day as directed. 1 kit 0     Buprenorphine HCl (BELBUCA) 600 MCG FILM buccal film Place 1 Film (600 mcg) inside cheek every 12 hours. 30 Film 0     Continuous Glucose Sensor (FREESTYLE ROSARIO 3 PLUS SENSOR) MISC Change every 15 days. 6 each 4     famotidine (PEPCID) 20 MG tablet Take 1 tablet (20 mg) by mouth 2 times daily. 60 tablet 0     fulvestrant (FASLODEX) 250 MG/5ML injection Inject 500 mg into the muscle every 28 days.       gabapentin (NEURONTIN) 100 MG capsule Take 1 capsule (100 mg) by mouth 2 times daily. 120 capsule 0     gabapentin (NEURONTIN) 300 MG capsule Take 1 capsule (300 mg) by mouth at bedtime. 60 capsule 0     hydrOXYzine HCl (ATARAX) 50 MG tablet Take 1 tablet (50 mg) by mouth 4 times daily as needed for anxiety or other (panic). 30 tablet 0     insulin glargine (LANTUS PEN) 100 UNIT/ML pen Inject 50 Units subcutaneously every morning. 15 mL 0     insulin lispro (HUMALOG KWIKPEN) 100 UNIT/ML (1 unit dial) KWIKPEN Inject 30 Units subcutaneously 3 times daily (before meals). Take 30 units before Large meals that contain carbohydrates (150 -170 grams of carbs) Take 20 units before average meals or snacks that contain carbohydrates (100 -120 grams of carbs)  Take 10 units before small meals that contain carbohydrates (50 -70 grams of carbs) 81 mL 0     insulin pen needle (32G X 4 MM) 32G X 4 MM miscellaneous Use 4x a day for insulin checks as directed. 100 each 0     Lidocaine (LIDOCARE) 4 % Patch Place 2 patches over 12 hours onto the skin  every 24 hours. To prevent lidocaine toxicity, patient should be patch free for 12 hrs daily. 30 patch 0     magic mouthwash suspension (diphenhydrAMINE, lidocaine, aluminum-magnesium & simethicone) Swish and swallow 10 mLs in mouth every 6 hours as needed for mouth sores. 120 mL 1     magnesium hydroxide (MILK OF MAGNESIA) 400 MG/5ML suspension Take 30 mLs by mouth daily as needed for constipation (Use if polyethylene glycol (Miralax) is not effective after 24 hours.). 354 mL 0     methocarbamol (ROBAXIN) 500 MG tablet Take 0.5 tablets (250 mg) by mouth 4 times daily as needed for muscle spasms. 20 tablet 0     naloxone (NARCAN) 4 MG/0.1ML nasal spray Spray 1 spray (4 mg) into one nostril alternating nostrils as needed for opioid reversal. every 2-3 minutes until assistance arrives 2 each 0     nicotine (NICODERM CQ) 14 MG/24HR 24 hr patch Place 1 patch over 24 hours onto the skin every 24 hours. 30 patch 0     OLANZapine (ZYPREXA) 5 MG tablet Take 1 tablet (5 mg) by mouth nightly as needed (difficulty sleeping). 30 tablet 0     ondansetron (ZOFRAN ODT) 4 MG ODT tab Take 1 tablet (4 mg) by mouth every 6 hours as needed for nausea or vomiting. 30 tablet 0     oxyCODONE (ROXICODONE) 5 MG tablet Take 1 tablet (5 mg) by mouth every 4 hours as needed for moderate pain. 20 tablet 0     polyethylene glycol (MIRALAX) 17 GM/Dose powder Take 17 g by mouth daily. 850 g 0     prochlorperazine (COMPAZINE) 10 MG tablet Take 1 tablet (10 mg) by mouth every 6 hours as needed for nausea or vomiting. 15 tablet 0     QUEtiapine (SEROQUEL) 400 MG tablet Take 1 tablet (400 mg) by mouth at bedtime. 30 tablet 0     rivaroxaban ANTICOAGULANT (XARELTO) 20 MG TABS tablet Take 1 tablet (20 mg) by mouth daily (with dinner). 90 tablet 3     senna-docusate (SENOKOT-S/PERICOLACE) 8.6-50 MG tablet Take 2 tablets by mouth 2 times daily. 20 tablet 0     sertraline (ZOLOFT) 100 MG tablet Take 1 tablet (100 mg) by mouth every morning. 30 tablet 0      "Vitamin D3 (CHOLECALCIFEROL) 25 mcg (1000 units) tablet Take 1 tablet (25 mcg) by mouth daily. 90 tablet 3     everolimus (AFINITOR) 10 MG tablet Take 1 tablet (10 mg) by mouth daily for 28 days Avoid grapefruit and grapefruit juice. Take with or without food, but should be consistent. 28 tablet 0     [Paused] metFORMIN (GLUCOPHAGE) 500 MG tablet Take 1 tablet (500 mg) by mouth 2 times daily (with meals). 60 tablet 1     No current facility-administered medications for this visit.        Physical Exam:     /69   Pulse 78   Wt 105.2 kg (232 lb)   SpO2 99%   BMI 31.46 kg/m    Wt Readings from Last 10 Encounters:   07/23/25 105.2 kg (232 lb)   07/21/25 105.2 kg (232 lb)   07/15/25 110.3 kg (243 lb 3.2 oz)   06/23/25 106.1 kg (234 lb)   06/20/25 109.3 kg (241 lb)   06/19/25 108.1 kg (238 lb 6.4 oz)   05/27/25 109.5 kg (241 lb 6.4 oz)   05/22/25 110.2 kg (243 lb)   05/13/25 113.9 kg (251 lb)   04/24/25 113 kg (249 lb 1.6 oz)     Estimated body mass index is 31.46 kg/m  as calculated from the following:    Height as of 7/15/25: 1.829 m (6').    Weight as of this encounter: 105.2 kg (232 lb).    General: Pleasant, well nourished and hydrated female in NAD.   Psych:  Mood is \"good,\" affect is appropriate.  Thought form and content are fluid and coherent.    HEENT: Eyes and sclera are clear. No lid lag, retraction. Extraocular movements are grossly intact without proptosis.  Nares are patent, mucous membranes moist. Dentition: Missing upper teeth  Resp: Easy and unlabored breathing, no wheezing, crepitus or rhonci.   Neuro: Alert and oriented, communicating clearly. Grossly intact, no focal deficits         Labs:       Recent Labs   Lab Test 07/21/25  1008 07/21/25  0212 07/21/25  0159 07/21/25  0022 07/15/25  0629 07/11/25  0647 07/10/25  1257 07/08/25  0547 07/07/25  2114 06/20/25  1215 06/19/25  1121 05/22/25  1353 04/24/25  1455 09/11/24  1053 09/11/24  1052 10/12/21  0940 09/13/21  1242   A1C  --   --   --   " --   --   --   --   --  10.5*  --  8.6*  --   --    < >  --    < >  --    TSH  --   --  1.98 2.60  --   --   --   --   --   --   --   --   --   --   --    < > 0.18*   T4  --   --   --   --   --   --   --   --   --   --   --   --   --   --   --   --  1.14   CHOL  --   --   --   --   --   --   --   --   --   --   --   --  204*  --  177   < >  --    LDL  --   --   --   --   --   --   --   --   --   --   --   --  116*  --  81   < >  --    HDL  --   --   --   --   --   --   --   --   --   --   --   --  69  --  74   < >  --    TRIG  --   --   --   --   --   --   --   --   --   --   --   --  93  --  110   < >  --    NA  --   --   --  140 139   < > 137   < >  --    < > 141   < > 139   < > 142   < > 144   CHLORIDE  --   --   --  108* 101   < > 101   < >  --    < > 105   < > 104   < > 108*   < > 113*   CO2  --   --   --  20* 26   < > 26   < >  --    < > 23   < > 27   < > 23   < > 26   ALBUMIN  --   --   --  3.1*  --   --  3.4*  --   --   --  3.7   < > 4.0   < > 3.9   < > 3.3*   CR  --   --   --  0.88 0.72   < > 0.63   < >  --    < > 0.91   < > 0.81   < > 1.13*   < > 0.84   MICROL <12.0  --   --   --   --   --   --   --   --   --   --   --   --   --   --   --   --    AST  --   --   --  27  --   --  56*  --   --   --  42   < > 52*   < > 17   < > 13   ALT  --   --   --  38  --   --  40  --   --   --  48   < > 122*   < > 11   < > 24   HGB  --  7.3*  --   --  8.2*   < > 8.9*   < >  --    < > 10.1*   < > 9.8*   < >  --    < > 10.6*    < > = values in this interval not displayed.       Lab Results   Component Value Date    GFR Estimate 80 07/21/2025    GFR Estimate >90 07/15/2025    GFR Estimate >90 07/14/2025    GFR Estimate >90 07/13/2025    GFR Estimate >90 07/12/2025    GFR Estimate 88 06/23/2021    GFR Estimate 59 05/27/2021    GFR Estimate >90 05/13/2021    GFR Estimate 88 04/20/2021    GFR Estimate >90 03/25/2021    GFR, ESTIMATED POCT 46 07/01/2024     Lab Results   Component Value Date    Albumin Urine mg/g Cr  07/21/2025       Comment:      Unable to calculate, urine albumin and/or urine creatinine is outside detectable limits.  Microalbuminuria is defined as an albumin:creatinine ratio of 17 to 299 for males and 25 to 299 for females. A ratio of albumin:creatinine of 300 or higher is indicative of overt proteinuria.  Due to biologic variability, positive results should be confirmed by a second, first-morning random or 24-hour timed urine specimen. If there is discrepancy, a third specimen is recommended. When 2 out of 3 results are in the microalbuminuria range, this is evidence for incipient nephropathy and warrants increased efforts at glucose control, blood pressure control, and institution of therapy with an angiotensin-converting-enzyme (ACE) inhibitor (if the patient can tolerate it).       FIB-4 Calculation: 0.79 at 7/21/2025  2:12 AM      Assessment & Recommendations:         ICD-10-CM    1. New onset type 2 diabetes mellitus (H)  E11.9 Albumin Random Urine Quantitative with Creat Ratio     Hemoglobin A1c     Lipid Profile     TSH with free T4 reflex     Adult Endocrinology  Referral     Continuous Glucose Sensor (FREESTYLE ROSARIO 3 PLUS SENSOR) [facility]     Adult Eye  Referral     Adult Diabetes Education  Referral          Reviewed [facility]'s current diabetes management plan in conjunction with their  and discussed opportunities for improved glycemic control. Patient needing education and supports for disease management.  Advised today on her insulin dosing and CF that was established from her hospitalization and emphasized that dosing of rapid insulin to occur 10-15 minutes prior to meals.  AND only to be taken when eating.  Advised that she would benefit from use of a CGM given erratic fingerstick testing and the amount of insulin that is being administered.  A sensor was placed today in office by our nursing personnel.   Start Freestyle Rosario 3+ continuous glucose monitor - an order will be  submitted to [place]   Referral for diabetes education to assist with insulin adjustment  Referral for eye appointment  Labs ordered  Recheck with Socorro in 3-4 weeks time  Provided patient and family with insulin adjustment directions from 7/7/25 hospital discharge    -Complication Management:  Microvascular: BP controlled, will need foot exam at future appointment due to time constraints today, needs updated albumin/creatinine ratio, referred for an eye exam  Macrovascular: smokes erratically, no statin use presently, needs lipid panel completed  Endocrine/Other: needs updated TSH    Patient understands and agrees with the above plan.     68 minutes spent on the date of the encounter doing chart review, history and exam, documentation, education and counseling, as well as communication and coordination of care, and further activities as noted above.  This time excludes time spent reviewing CGM.    It is my privilege to be involved in the care of the above patient.     Socorro He PA-C, MPAS, MPH  [facility]        Diabetes, Endocrinology, and Metabolism      [phone] Appointments        This note was completed in part using Dragon voice recognition, and may contain word and grammatical errors.    Again, thank you for allowing me to participate in the care of your patient.      Sincerely,    Karma He PA-C

## 2025-07-23 NOTE — TELEPHONE ENCOUNTER
Spoke with Mars MATHEWS with [facility] and gave the following verbal orders: add a Medical SW to case week of 7/28/2025.  Zahra DAWKINS LPN  [facility]

## 2025-07-24 ENCOUNTER — PATIENT OUTREACH (OUTPATIENT)
Dept: CARE COORDINATION | Facility: CLINIC | Age: 50
End: 2025-07-24

## 2025-07-24 ENCOUNTER — OFFICE VISIT (OUTPATIENT)
Dept: NEUROSURGERY | Facility: CLINIC | Age: 50
End: 2025-07-24
Payer: MEDICARE

## 2025-07-24 ENCOUNTER — TELEPHONE (OUTPATIENT)
Dept: ONCOLOGY | Facility: CLINIC | Age: 50
End: 2025-07-24

## 2025-07-24 VITALS — SYSTOLIC BLOOD PRESSURE: 118 MMHG | HEART RATE: 85 BPM | DIASTOLIC BLOOD PRESSURE: 79 MMHG | OXYGEN SATURATION: 97 %

## 2025-07-24 DIAGNOSIS — Z98.1 S/P LUMBAR FUSION: Primary | ICD-10-CM

## 2025-07-24 LAB
1OH-MIDAZOLAM UR CFM-MCNC: <20 NG/ML
7AMINOCLONAZEPAM UR CFM-MCNC: <5 NG/ML
A-OH ALPRAZ UR CFM-MCNC: <5 NG/ML
ALPRAZ UR CFM-MCNC: <5 NG/ML
CHLORDIAZEP UR CFM-MCNC: <20 NG/ML
CLONAZEPAM UR CFM-MCNC: <5 NG/ML
DIAZEPAM UR CFM-MCNC: <20 NG/ML
LORAZEPAM UR CFM-MCNC: <20 NG/ML
MIDAZOLAM UR CFM-MCNC: <20 NG/ML
NORDIAZEPAM UR CFM-MCNC: <20 NG/ML
OXAZEPAM UR CFM-MCNC: 42 NG/ML
TEMAZEPAM UR CFM-MCNC: 39 NG/ML

## 2025-07-24 NOTE — LETTER
7/24/2025       RE: Teresa Sanderson  [address]  [facility] [address]     Dear Colleague,    Thank you for referring your patient, Teresa Sanderson, to the [facility] NEUROSURGERY [facility]IN[facility] [place] at [facility]. Please see a copy of my visit note below.        Neurosurgery Clinic Note    Chief Complaint: surgical follow-up     DATE OF VISIT: 7/24/2025    HPI: Teresa Sanderson is a pleasant 49 year old female who is s/p L3-5 PSF with Dr Goodrich on 7/7/2025.  Patient underwent above-mentioned procedure on 7/7/2025.  Following the procedure the patient was transferred to the floor.  The patient's course was complicated by elevated glucoses requiring endocrinology consult and initiation of type 2 DM medication regimen, as well as a rapid response called 7/10/2025 for transient decreased LOC, with immediate return to baseline when patient stimulated.  Patient underwent post operative upright x-ray of complete spine which demonstrated expected post-operative changes from L3-5 posterior fusion. On post operative day 8, she was ambulating, voiding without a torres, eating a regular diet, pain was well controlled and therefore she was deemed medically ready for discharge.  Patient's PTA xarelto and chemotherapy was held until POD #14.   Patient missed her follow-up appointment for suture removal due to an ED visit on 7/21/2025,  A CT of the lumbar spine was obtained which demonstrated a nonspecific fluid collection dorsal to the surgical site, likely a seroma. Her hardware was intact with no evidence of loosening. She does not have any weakness, numbness, change in bowel or bladder function, or other neurologic symptoms. Her incision remains clean, dry and intact. She has not had any leak from the incision.    Currently, complains of back pain around the incision site, no leg pain.  Complains of urinary urgency however no bowel dysfunction.  Taking oxycodone,  tylenol, gabapentin, and robaxin for pain.  States she is tolerating ADLs but feels deconditioned.  States the pain is getting better every day.  No trauma or injury since surgery.   Patient  denies any fevers, chills, wound drainage, or other signs of infection from the incision site.        Review of Systems   Negative except in HPI    Past Medical History:   Diagnosis Date     Anxiety      Breast CA (H) 12/2020    presenting with bone mets     Depression      DM2 (diabetes mellitus, type 2) (H) 06/19/2025     DVT (deep venous thrombosis) (H) 2014     Hx of radiation therapy      Left breast mass     x approximately 4-5 months     Metastasis to bone (H)      Pathological fracture of vertebra due to malignant neoplasm metastatic to bone (H)      Prediabetes 2023     Pulmonary emboli (H)      Pyelonephritis      Schizoaffective disorder (H)      Tobacco use        Past Surgical History:   Procedure Laterality Date     COLONOSCOPY N/A 7/8/2022    Procedure: COLONOSCOPY, WITH POLYPECTOMY;  Surgeon: Ham Cano MD;  Location: [facility] OR     ESOPHAGOSCOPY, GASTROSCOPY, DUODENOSCOPY (EGD), COMBINED N/A 3/7/2025    Procedure: ESOPHAGOGASTRODUODENOSCOPY, WITH BIOPSY;  Surgeon: Nguyen Holliday MD;  Location: [facility] OR     IR LUMBAR KYPHOPLASTY VERTEBRAE  5/17/2021     LAPAROSCOPIC SALPINGO-OOPHORECTOMY Bilateral 8/20/2021    Procedure: BILATERAL SALPINGO-OOPHORECTOMY, LAPAROSCOPIC;  Surgeon: Rory Lopez MD;  Location: [facility] OR     OPTICAL TRACKING SYSTEM FUSION SPINE POSTERIOR LUMBAR THREE+ LEVELS N/A 7/7/2025    Procedure: o-arm/stealth assisted Lumbar 3-5 fusion with pedicle screws;  Surgeon: Modesta Goodrich MD;  Location: [facility] OR     TUBAL LIGATION  1998       Social History     Socioeconomic History     Marital status: Single   Tobacco Use     Smoking status: Some Days     Current packs/day: 0.25     Average packs/day: 0.3 packs/day for 14.2 years (3.5 ttl pk-yrs)     Types: Cigarettes     Start date:  5/13/2011     Passive exposure: Current     Smokeless tobacco: Never     Tobacco comments:     6/19/25: Patient states she has had 2 cigarettes in the last 48 hours. States she is not smoking daily but trying to quit.   Vaping Use     Vaping status: Never Used   Substance and Sexual Activity     Alcohol use: Not Currently     Comment: occ     Drug use: Not Currently     Types: Marijuana     Comment: occ     Sexual activity: Not Currently     Partners: Male     Social Drivers of Health     Financial Resource Strain: Low Risk  (7/11/2025)    Financial Resource Strain      Within the past 12 months, have you or your family members you live with been unable to get utilities (heat, electricity) when it was really needed?: No   Food Insecurity: High Risk (7/11/2025)    Food Insecurity      Within the past 12 months, did you worry that your food would run out before you got money to buy more?: Yes      Within the past 12 months, did the food you bought just not last and you didn t have money to get more?: Yes   Transportation Needs: Low Risk  (7/11/2025)    Transportation Needs      Within the past 12 months, has lack of transportation kept you from medical appointments, getting your medicines, non-medical meetings or appointments, work, or from getting things that you need?: No   Physical Activity: Insufficiently Active (3/4/2025)    Exercise Vital Sign      Days of Exercise per Week: 1 day      Minutes of Exercise per Session: 60 min   Stress: Stress Concern Present (3/4/2025)    [place] [place] [employer] - Occupational Stress Questionnaire      Feeling of Stress : Very much   Social Connections: Unknown (3/4/2025)    Social Connection and Isolation Panel [NHANES]      Frequency of Social Gatherings with Friends and Family: Never   Interpersonal Safety: Low Risk  (7/11/2025)    Interpersonal Safety      Do you feel physically and emotionally safe where you currently live?: Yes      Within the past 12  months, have you been hit, slapped, kicked or otherwise physically hurt by someone?: No      Within the past 12 months, have you been humiliated or emotionally abused in other ways by your partner or ex-partner?: No   Housing Stability: Low Risk  (7/11/2025)    Housing Stability      Do you have housing? : Yes      Are you worried about losing your housing?: No       family history includes Asthma in her son; Cardiovascular in her maternal aunt; Deep Vein Thrombosis (DVT) in her daughter; Diabetes in her cousin and granddaughter; Diabetes Type 1 in her daughter; Hypertension in her cousin; Kidney Disease in her brother; Lung Cancer in her father and mother; Lupus in her brother.              Physical Exam   There were no vitals taken for this visit.  Constitutional: Oriented to person, place, and time. Appears well-developed and well-nourished. Cooperative. No distress.   HENT: Head normocephalic and atraumatic.     Incision:  Clean, dry and intact.  No erythema, induration or fluctuance.  No drainage noted.  Neurological: alert and oriented to person, place, and time. CN II-XII grossly  intact      STRENGTH LEFT RIGHT   Deltoid 5 5   Bicep 5 5   Wrist Extensor 5 5   Tricep 5 5   Finger flexion 5 5   Finger abduction 5 5    5 5       Hip Flexion     4-     5   Knee Extension 4+ 5   Ankle Dorsiflexion 5 5   Extensor Hallucis Longus 5 5   Plantar Flexion 4+ 5   Foot eversion 5 5   Foot inversion 5 5       Skin: Skin is warm, dry and intact.   Psychiatric: Normal mood and affect. Speech is normal and behavior is normal.      IMAGING:  No imaging available for review today     ASSESSMENT:  Teresa Sanderson is a 49 year old female s/p L3-5 PSF with Dr Goodrich on 7/7/2025.    PLAN:  Do not do any bending, twisting, strenuous exercise, or heavy lifting (greater than 10 pounds) for 4-6 weeks. Be careful and ask for assistance when walking or going up and down stairs. Avoid any activities that could result in trauma to the  surgical wound. Do not drive while using narcotics or other sedating medications, such as sleep aids, muscle relaxants, etc.    Ok to shower, however do not submerge wound in water (no bath tubs, hot tubs, pools, lakes, rivers).     Please refrain from using tobacco products of any form.   Do not use alcohol or other sedating substances while taking prescription pain medications.      Please do not take any NSAIDs (advil, aleve, motrin, ibuprofen, celebrex, etc) as they inhibit bony fusion.    If you should sustain new trauma or injury or note increased headache, weakness, speech or vision issues, confusion, or other neurologic changes please call 911 or present to your nearest emergency room for further evaluation.     Please continue to abstain from medications that may thin your blood.     Please follow-up with Renetta Miranda PA-C in 4 weeks with x-rays prior to visit.                I spent 30 minutes in patient care with greater than 50% spent in counseling and/or coordination of care.     I performed independent visualization of radiographic imaging and entered my own interpretation, reviewed and/or ordered tests in radiology        RENO Lua, CNP  Department of Neurosurgery  Pager: [phone]        Again, thank you for allowing me to participate in the care of your patient.      Sincerely,    RENO Wolff CNP

## 2025-07-24 NOTE — OP NOTE
PATIENT NAME: Teresa Sanderson  PATIENT MRN: [id number]  PATIENT ACCOUNT: [id number]  PATIENT YOB: 1975    NEUROSURGERY OPERATIVE REPORT     DATE OF SURGERY: 07/07/25     PREOPERATIVE DIAGNOSIS:  Pathologic fracture of L4 vertebra     POSTOPERATIVE DIAGNOSIS:  Pathologic fracture of L4 vertebra     PROCEDURES PERFORMED:  Lumbar fusion of L3-L5  Intraoperative use of O-arm/Stealth assistance  Arthrodesis     STAFF SURGEON: Modesta Goodrich MD, PhD     RESIDENT SURGEONS: Adrian Garcia MD     ANESTHESIA: General endotracheal anesthesia     ESTIMATED BLOOD LOSS: 100 mL     IMPLANTS:   SHANK SCR 55MM 5.5MM CD HZN MODULEX OSTEOGRIP JULIA SPNE STRL - [id number] Metallic Hardware/[place] SHANK SCR 55MM 5.5MM CD HZN MODULEX OSTEOGRIP JULIA SPNE STRL   [employer] [id number] N/A 1 Implanted   SCREW BN PEEK SS MA STRL SPNE 5.5/6 MM QUETA [id number] - [id number] Metallic Hardware/[place] SCREW BN PEEK SS MA STRL SPNE 5.5/6 MM QUETA [id number]   [employer] [id number] N/A 1 Implanted   SCREW BN SS MA ST STRL SPNE 5.5/6 MM QUETA [id number] - [id number] Metallic Hardware/[place] SCREW BN SS MA ST STRL SPNE 5.5/6 MM QUETA [id number]   [employer] [id number] N/A 1 Implanted   SCREW BN SS MA ST STRL SPNE 5.5/6 MM QUETA [id number] - [id number] Metallic Hardware/[place] SCREW BN SS MA ST STRL SPNE 5.5/6 MM QUETA [id number]   [employer] [id number] N/A 1 Implanted   SCREW BN 55MM 6.5MM SHNK JULIA OSTEOGRIP SPNE - [id number] Metallic Hardware/[place] SCREW BN 55MM 6.5MM SHNK JULIA OSTEOGRIP SPNE   [employer] [id number] N/A 1 Implanted   SCREW BN 50MM 5.5MM SHNK JULIA OSTEOGRIP SPNE - [id number] Metallic Hardware/[place] SCREW BN 50MM 5.5MM SHNK JULIA OSTEOGRIP SPNE   [employer] [id number] N/A 1 Implanted   SHANK SCR 35MM 6.5MM OSTEOGRIP JULIA SPNE - [id number] Metallic Hardware/[place] SHANK SCR 35MM 6.5MM OSTEOGRIP JULIA SPNE   [employer] [id number] N/A 1 Implanted   SHANK SCR 35MM 6.5MM OSTEOGRIP JULIA DEYNE - [id number] Metallic Hardware/[place] SHANK SCR  35MM 6.5MM OSTEOGRIP JULIA SPNE   [employer] [id number] N/A 1 Implanted   SCREW BN 55MM 6.5MM SHNK JULIA OSTEOGRIP SPNE - [id number] Metallic Hardware/[place] SCREW BN 55MM 6.5MM SHNK JULIA OSTEOGRIP SPNE   [employer] [id number] N/A 1 Implanted   IMP QUETA MEDT SOLERA CVD 5.5X55MM TI [id number] - SNA Metallic Hardware/[place] IMP QUETA MEDT SOLERA CVD 5.5X55MM TI [id number] NA [employer]   N/A 1 Implanted   IMP QUETA MEDT SOLERA CVD 5.5X60MM TI [id number] - SNA Metallic Hardware/[place] IMP QUETA MEDT SOLERA CVD 5.5X60MM TI [id number] NA [employer]   N/A 1 Implanted   BONE CHIP CANCELLOUS 30CC [id number] - [id number] Bone/Tissue/Biologic BONE CHIP CANCELLOUS 30CC [id number]      N/A 1 Implanted     EXPLANTS: None     DRAINS: Subfascial Hemovac     FINDINGS: Screws placed at L3, L4 and L5.  No CSF leak.     COMPLICATIONS: None     INDICATIONS: This is a 49-year-old female with a past medical history of metastatic breast cancer with prior radiation from L3-5 as well as kyphoplasty at L4.  She was more recently found to have bilateral pedicle fractures on L4 with concern for possible instability though she remained intact however except with significant back pain.  Given her instability, she was indicated for fusion.  All risks and benefits were discussed with the patient and her family in the preoperative area.     DESCRIPTION OF PROCEDURE:  She was taken to the operating room where she was confirmed via multiple identifiers.  She underwent uneventful endotracheal intubation with our anesthesia colleagues under general anesthesia.  A left arterial line catheter was placed.  A Crisostomo catheter was placed.  She was then flipped prone onto a foam head interiano.  All pressure points were adequately padded.  C-arm was taken to plan incision centered over the L4 vertebral body extending all the way up to L3 and L5 for planned screws.  Area was then cleaned, prepped, and draped in the usual sterile fashion.  She received her  intraoperative Ancef of 2 g.  Images were brought up.  Surgical timeout was then performed confirming the patient's name, age, date of birth and laterality stated procedure.  All members of the operating room were in agreement.    10 blade was used to make incision through the skin and dermis along our planned incision line.  Monopolar cautery was used to aid in our dissection to come to the fat layer.  Cerebellar retractors were used to aid in exposure.  Kocher clamp was placed on we believed to be the L4 spinous process.  This was confirmed with C-arm fluoroscopy.  We then performed a bilateral subperiosteal dissection along the L3, L4, and L5 lamina.  Stealth clamp was placed on the L5 spinous process and pointed inferiorly.  O-arm spin was performed.  We then began with screw placement.  Screws were first placed at L5 and then at L4, and L3.  During placement of L4 screws we did begin to feel the screws touching cement anteriorly.  However we made sure our screws were short and not passing through the cement.  O-arm spin was then performed subsequently and we able to confirm excellent screw placement.  We then placed our rods with slight lordosis.  Rods were locked into place with final tightening.  Area was then irrigated copiously.  Arthrodesis was subsequently performed using a Annetta drill with an AM8 drill bit.  A subfascial Hemovac was then tunneled superiorly.  We then turned our attention towards closure.    Muscle layer was closed using 0 Vicryl in simple interrupted fashion loosely.  Fascia was closed with 0 Vicryl simple interrupted fashion tightly in a watertight fashion.  Fat layer was brought together loosely using 0 Vicryl in a simple interrupted fashion.  A second more superficial layer of fat was closed using horizontal mattress to titus the skin edges.  Dermis was then closed using 2-0 Vicryl in an inverted interrupted fashion.  Skin was then closed using 3-0 nylon in a running continuous  fashion.  Incision was then cleaned with a wet and dry.  ChloraPrep was applied.  Bacitracin was applied over top and Primapore used to cover the incision.  Hemovac drain was secured to the skin using 2-0 Vicryl and set to full suction.    Dr. Goodrich was present for the critical portions of the procedure and immediately available for the remainder.     Modesta Goodrich MD, PhD  Staff Surgeon  [facility]    As dictated by:  Adrian Garcia MD  Neurosurgery Resident, PGY-6    I was present for the critical portions of the operation and immediately available for the remainder.  AMP

## 2025-07-24 NOTE — NURSING NOTE
Chief Complaint   Patient presents with    Surgical Followup     Post op followup - suture removal     Abdullahi Mei

## 2025-07-24 NOTE — ORAL ONC MGMT
Oral Chemotherapy Monitoring Program     Placed call to patient in follow up of oral chemotherapy. Left message requesting call back. No drug names were mentioned. Will update when response received.     Follow up visit with Dr Plunkett was missed due to ER visit, not yet rescheduled.    Nadya Broderick, PharmD, [facility]  Oral Chemotherapy Monitoring Program  [facility]  [phone]

## 2025-07-24 NOTE — PATIENT INSTRUCTIONS
Do not do any bending, twisting, strenuous exercise, or heavy lifting (greater than 10 pounds) for 4-6 weeks. Be careful and ask for assistance when walking or going up and down stairs. Avoid any activities that could result in trauma to the surgical wound. Do not drive while using narcotics or other sedating medications, such as sleep aids, muscle relaxants, etc.    Ok to shower, however do not submerge wound in water (no bath tubs, hot tubs, pools, lakes, rivers).     Please refrain from using tobacco products of any form.   Do not use alcohol or other sedating substances while taking prescription pain medications.      Please do not take any NSAIDs (advil, aleve, motrin, ibuprofen, celebrex, etc) as they inhibit bony fusion.    If you should sustain new trauma or injury or note increased headache, weakness, speech or vision issues, confusion, or other neurologic changes please call 911 or present to your nearest emergency room for further evaluation.     Please continue to abstain from medications that may thin your blood.     Please follow-up with Renetta Miranda PA-C in 4 weeks with x-rays prior to visit.

## 2025-07-28 ENCOUNTER — TELEPHONE (OUTPATIENT)
Dept: ONCOLOGY | Facility: CLINIC | Age: 50
End: 2025-07-28
Payer: MEDICARE

## 2025-07-28 ENCOUNTER — PATIENT OUTREACH (OUTPATIENT)
Dept: CARE COORDINATION | Facility: CLINIC | Age: 50
End: 2025-07-28
Payer: MEDICARE

## 2025-07-28 ENCOUNTER — DOCUMENTATION ONLY (OUTPATIENT)
Dept: INTERNAL MEDICINE | Facility: CLINIC | Age: 50
End: 2025-07-28
Payer: MEDICARE

## 2025-07-28 NOTE — ORAL ONC MGMT
[facility] Discharge Summary    Admitting Physician: Jocelyne Pacheco MD  Attending Physician: Jocelyne Pacheco MD  Date of Admit: 1/19/2024  Date of Discharge: 1/20/2024    Condition: Stable  Outcome: Condition has improved and patient is now ready for discharge.  DISPOSITION: Home or Self Care    Discharge Diagnoses     Patient Active Problem List   Diagnosis    Post-transplant diabetes mellitus    Thyroid cancer    Postsurgical hypothyroidism    Multinodular goiter    Leukocytosis    Abnormal liver function tests       Principal Problem:  Hyperosmolar hyperglycemic state (HHS)    Consultants and Procedures     Consultants:  IP CONSULT TO HOSPITAL MEDICINE    Procedures:   * No surgery found *     Brief Admission History      27 y.o. 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 Patient is nonverbal, history obtained primarily from the mother, father is at bedside. Per mother's report, patient usually has chronic diarrhea, however in the past month began having changes in bowel movements described as harder forming stool. Mother states patient has also been moaning and groaning but unable to communicate any specific symptoms due to nonverbal at baseline. At endocrinology clinic patient noted to have a  and advised to go to ED for evaluation.      ED Course  Patient presents ED tachycardic 153 hypertensive 153/91. VBG shows pH of 7.43. WBC 18.02, K 5.0, glucose 545, UA without ketones or bacteria. EKG non-STEMI.  Aspart 100 units and regular insulin 6 units given with LR 1 L bolusHospital Medicine was consulted agrees with plan for admission.      Hospital Course with Pertinent Findings     Admitted due to hyperglycemia and  Oral Chemotherapy Monitoring Program     Placed call to Teresa Sanderson in follow up of everolimus oral chemotherapy.   This call was to complete an assessment after hospitalization and check supply.   Call went straight to voicemail and mailbox was full so unable to leave a message. Will attempt call again in a few days.    Pearl Garcia, PharmD  Hematology/Oncology Clinical Pharmacist  Oral Chemotherapy Monitoring Program  [facility]  [phone]  July 28, 2025       tachycardia. She was determined to be dehydrated so administered IVFs with subsequent resolution of tachycardia. She does have tachycardia of 100-120 at baseline however on presentation she was upwards of 150 bpm. Her hyperglycemia was likely caused by her daily steroid use due to renal transplant years ago. She did have a leukocytosis upon admission but concern for infection is very low given no symptoms of infection and afebrile. Return to ED precautions were discussed.     Discharge physical exam:  Vitals  BP: (!) 145/73  Temp: 97.7 °F (36.5 °C)  Temp Source: Oral  Pulse: 103  Resp: 18  SpO2: 96 %  Weight: 50 kg (110 lb 3.7 oz)    Physical Exam  Constitutional:       Comments: Awake  Does not appear in any distress  Nonverbal  HENT:      Mouth/Throat:      Mouth: Mucous membranes are moist.      Pharynx: Oropharynx is clear.   Cardiovascular:      Rate and Rhythm: Normal rate and regular rhythm.   Pulmonary:      Effort: Pulmonary effort is normal. No respiratory distress.      Breath sounds: Normal breath sounds.   Abdominal:      General: Abdomen is flat.      Palpations: Abdomen is soft.   Musculoskeletal:      Right lower leg: No edema.      Left lower leg: No edema.     TIME SPENT ON DISCHARGE: 60 minutes    Discharge Medications        Medication List        CONTINUE taking these medications      albuterol 90 mcg/actuation inhaler  Commonly known as: PROVENTIL/VENTOLIN HFA     amLODIPine 5 MG tablet  Commonly known as: NORVASC     AQUAPHOR HEALING 41 % Oint  Generic drug: white petrolatum     aspirin 325 MG tablet     atorvastatin 20 MG tablet  Commonly known as: LIPITOR     azelastine 137 mcg (0.1 %) nasal spray  Commonly known as: ASTELIN     BASAGLAR KWIKPEN U-100 INSULIN glargine 100 units/mL SubQ pen  Generic drug: insulin     cetirizine 1 mg/mL syrup  Commonly known as: ZYRTEC     FLOVENT HFA 44 mcg/actuation inhaler  Generic drug: fluticasone propionate     folic acid 1 MG tablet  Commonly known as:  "FOLVITE     glipiZIDE 5 MG tablet  Commonly known as: GLUCOTROL  Take 2 tablets (10 mg total) by mouth 2 (two) times daily with meals.     ipratropium 42 mcg (0.06 %) nasal spray  Commonly known as: ATROVENT     ketoconazole 2 % shampoo  Commonly known as: NIZORAL     lansoprazole 30 MG disintegrating tablet  Commonly known as: PREVACID SOLUTAB     levocarnitine 100 mg/mL Soln  Commonly known as: CARNITOR     levothyroxine 125 MCG tablet  Commonly known as: SYNTHROID  Take half of a 125mcg (62.5mcg) tablet daily.     LEVSIN/SL 0.125 mg Subl  Generic drug: hyoscyamine     metoprolol tartrate 25 MG tablet  Commonly known as: LOPRESSOR     multivitamin Liqd     mupirocin 2 % ointment  Commonly known as: BACTROBAN     mycophenolate mofetil 200 mg/mL Susr  Commonly known as: CELLCEPT     ondansetron 4 mg/5 mL solution  Commonly known as: ZOFRAN     prednisoLONE 15 mg/5 mL (3 mg/mL) solution  Commonly known as: ORAPRED     tacrolimus 0.5 MG Cap  Commonly known as: PROGRAF     TRADJENTA 5 mg Tab tablet  Generic drug: linaGLIPtin  Take 1 tablet (5 mg total) by mouth once daily.     triamcinolone acetonide 0.025% 0.025 % cream  Commonly known as: KENALOG     * TRUEPLUS PEN NEEDLE 31 gauge x 3/16" Ndle  Generic drug: pen needle, diabetic     * pen needle, diabetic 31 gauge x 3/16" Ndle           * This list has 2 medication(s) that are the same as other medications prescribed for you. Read the directions carefully, and ask your doctor or other care provider to review them with you.                  Discharge Information:     - Continue home medications as prescribed  - Stay well hydrated    Haresh Cash MD  [facility]  PGY-II  "

## 2025-07-28 NOTE — PROGRESS NOTES
[facility] of Form Received[facility]18 [facility] 9[facility]15 [id number]    Form Received (Date) 7/25/2025   Form Filled out Yes, faxed 7/29   Placed in provider folder Yes

## 2025-07-29 ENCOUNTER — TELEPHONE (OUTPATIENT)
Dept: INTERNAL MEDICINE | Facility: CLINIC | Age: 50
End: 2025-07-29

## 2025-07-29 ENCOUNTER — DOCUMENTATION ONLY (OUTPATIENT)
Dept: INTERNAL MEDICINE | Facility: CLINIC | Age: 50
End: 2025-07-29

## 2025-07-29 NOTE — PROGRESS NOTES
Type of Form Received:[facility] [id number]    Form Received (Date) 7/28/2025   Form Filled out Yes, faxed 7/31   Placed in provider folder Yes

## 2025-07-29 NOTE — TELEPHONE ENCOUNTER
[facility] Call Center    Phone Message    May a detailed message be left on voicemail: yes     Reason for Call: Other: Needs an updated  list of medication, please call.       Action Taken: Message routed to:  [facility] PCC    Travel Screening: Not Applicable     Date of Service:

## 2025-07-30 ENCOUNTER — MEDICAL CORRESPONDENCE (OUTPATIENT)
Dept: HEALTH INFORMATION MANAGEMENT | Facility: CLINIC | Age: 50
End: 2025-07-30
Payer: MEDICARE

## 2025-07-30 NOTE — TELEPHONE ENCOUNTER
Spoke to [facility] - she is requesting patients medication list be faxed to [phone]    Faxed today 7/30/25    Charity Walker RN on 7/30/2025 at 10:36 AM

## 2025-07-31 ENCOUNTER — TELEPHONE (OUTPATIENT)
Dept: NEUROSURGERY | Facility: CLINIC | Age: 50
End: 2025-07-31
Payer: MEDICARE

## 2025-07-31 ENCOUNTER — TELEPHONE (OUTPATIENT)
Dept: ONCOLOGY | Facility: CLINIC | Age: 50
End: 2025-07-31
Payer: MEDICARE

## 2025-07-31 DIAGNOSIS — C50.912 CARCINOMA OF LEFT BREAST METASTATIC TO BONE (H): Primary | ICD-10-CM

## 2025-07-31 DIAGNOSIS — Z17.0 MALIGNANT NEOPLASM OF OVERLAPPING SITES OF LEFT BREAST IN FEMALE, ESTROGEN RECEPTOR POSITIVE (H): ICD-10-CM

## 2025-07-31 DIAGNOSIS — C50.812 MALIGNANT NEOPLASM OF OVERLAPPING SITES OF LEFT BREAST IN FEMALE, ESTROGEN RECEPTOR POSITIVE (H): ICD-10-CM

## 2025-07-31 DIAGNOSIS — C79.51 CARCINOMA OF LEFT BREAST METASTATIC TO BONE (H): Primary | ICD-10-CM

## 2025-07-31 RX ORDER — EVEROLIMUS 10 MG/1
10 TABLET ORAL DAILY
Qty: 28 TABLET | Refills: 0 | Status: SHIPPED | OUTPATIENT
Start: 2025-07-31 | End: 2025-08-28

## 2025-07-31 NOTE — TELEPHONE ENCOUNTER
Left Voicemail (1st Attempt) for the patient to call back and schedule the following:    Appointment type: Post op neurosurg - in person  Provider: Renetta Miranda  Return date: Aug 18th 2025  Specialty phone number: [phone]  Additional appointment(s) needed: Xrays prior on same day  Additonal Notes: 6 wk post op/ DOS 07/07/2025/ XR prior.

## 2025-07-31 NOTE — ORAL ONC MGMT
Oral Chemotherapy Monitoring Program     Placed call to patient in follow up of oral chemotherapy. Call went straight to voicemail and unable to leave voicemail due to full inbox.    This call was to complete an assessment after hospitalization and check supply for sending refill. Per chart review, patient was told to resume everolimus 7/21/25. She also had missed an appointment with Dr. Plunkett on 7/21 that might need to be rescheduled.       Evelyn Mcghee, PharmD, [facility]  Hematology/Oncology Clinical Pharmacist  Oral Chemotherapy Monitoring Program  [facility]  [phone]

## 2025-08-02 RX ORDER — METHYLPREDNISOLONE SODIUM SUCCINATE 40 MG/ML
40 INJECTION INTRAMUSCULAR; INTRAVENOUS
Start: 2025-08-18

## 2025-08-02 RX ORDER — MEPERIDINE HYDROCHLORIDE 25 MG/ML
25 INJECTION INTRAMUSCULAR; INTRAVENOUS; SUBCUTANEOUS
OUTPATIENT
Start: 2025-08-18

## 2025-08-02 RX ORDER — DIPHENHYDRAMINE HYDROCHLORIDE 50 MG/ML
50 INJECTION, SOLUTION INTRAMUSCULAR; INTRAVENOUS
Start: 2025-08-18

## 2025-08-02 RX ORDER — ALBUTEROL SULFATE 90 UG/1
1-2 INHALANT RESPIRATORY (INHALATION)
Start: 2025-08-18

## 2025-08-02 RX ORDER — ZOLEDRONIC ACID 0.04 MG/ML
4 INJECTION, SOLUTION INTRAVENOUS ONCE
OUTPATIENT
Start: 2025-11-03 | End: 2025-11-03

## 2025-08-02 RX ORDER — HEPARIN SODIUM (PORCINE) LOCK FLUSH IV SOLN 100 UNIT/ML 100 UNIT/ML
5 SOLUTION INTRAVENOUS
OUTPATIENT
Start: 2025-11-03

## 2025-08-02 RX ORDER — DIPHENHYDRAMINE HYDROCHLORIDE 50 MG/ML
25 INJECTION, SOLUTION INTRAMUSCULAR; INTRAVENOUS
Start: 2025-08-18

## 2025-08-02 RX ORDER — ALBUTEROL SULFATE 0.83 MG/ML
2.5 SOLUTION RESPIRATORY (INHALATION)
OUTPATIENT
Start: 2025-08-18

## 2025-08-02 RX ORDER — EPINEPHRINE 1 MG/ML
0.3 INJECTION, SOLUTION INTRAMUSCULAR; SUBCUTANEOUS EVERY 5 MIN PRN
OUTPATIENT
Start: 2025-08-18

## 2025-08-02 RX ORDER — LAMOTRIGINE 25 MG/1
500 TABLET ORAL ONCE
OUTPATIENT
Start: 2025-08-18 | End: 2025-08-18

## 2025-08-02 RX ORDER — HEPARIN SODIUM,PORCINE 10 UNIT/ML
5 VIAL (ML) INTRAVENOUS
OUTPATIENT
Start: 2025-11-03

## 2025-08-04 ENCOUNTER — TELEPHONE (OUTPATIENT)
Dept: ENDOCRINOLOGY | Facility: CLINIC | Age: 50
End: 2025-08-04

## 2025-08-04 ENCOUNTER — ONCOLOGY VISIT (OUTPATIENT)
Dept: ONCOLOGY | Facility: CLINIC | Age: 50
End: 2025-08-04
Attending: INTERNAL MEDICINE
Payer: MEDICARE

## 2025-08-04 ENCOUNTER — LAB (OUTPATIENT)
Dept: LAB | Facility: CLINIC | Age: 50
End: 2025-08-04
Payer: MEDICARE

## 2025-08-04 ENCOUNTER — TELEPHONE (OUTPATIENT)
Dept: NEUROSURGERY | Facility: CLINIC | Age: 50
End: 2025-08-04

## 2025-08-04 DIAGNOSIS — F25.0 SCHIZOAFFECTIVE DISORDER, BIPOLAR TYPE (H): ICD-10-CM

## 2025-08-04 DIAGNOSIS — C50.812 MALIGNANT NEOPLASM OF OVERLAPPING SITES OF LEFT BREAST IN FEMALE, ESTROGEN RECEPTOR POSITIVE (H): ICD-10-CM

## 2025-08-04 DIAGNOSIS — C79.51 CARCINOMA OF LEFT BREAST METASTATIC TO BONE (H): Primary | ICD-10-CM

## 2025-08-04 DIAGNOSIS — C50.912 CARCINOMA OF LEFT BREAST METASTATIC TO BONE (H): ICD-10-CM

## 2025-08-04 DIAGNOSIS — C50.912 CARCINOMA OF LEFT BREAST METASTATIC TO BONE (H): Primary | ICD-10-CM

## 2025-08-04 DIAGNOSIS — K13.79 MOUTH SORES: ICD-10-CM

## 2025-08-04 DIAGNOSIS — C50.912 BREAST CANCER METASTASIZED TO BONE, LEFT (H): ICD-10-CM

## 2025-08-04 DIAGNOSIS — Z17.0 MALIGNANT NEOPLASM OF OVERLAPPING SITES OF LEFT BREAST IN FEMALE, ESTROGEN RECEPTOR POSITIVE (H): ICD-10-CM

## 2025-08-04 DIAGNOSIS — C79.51 CARCINOMA OF LEFT BREAST METASTATIC TO BONE (H): ICD-10-CM

## 2025-08-04 DIAGNOSIS — M84.48XD PATHOLOGICAL FRACTURE OF LUMBAR VERTEBRA WITH ROUTINE HEALING, SUBSEQUENT ENCOUNTER: ICD-10-CM

## 2025-08-04 DIAGNOSIS — Z51.11 ENCOUNTER FOR ANTINEOPLASTIC CHEMOTHERAPY: ICD-10-CM

## 2025-08-04 DIAGNOSIS — C79.51 BREAST CANCER METASTASIZED TO BONE, LEFT (H): ICD-10-CM

## 2025-08-04 LAB
ALBUMIN SERPL BCG-MCNC: 3.9 G/DL (ref 3.5–5.2)
ALP SERPL-CCNC: 204 U/L (ref 40–150)
ALT SERPL W P-5'-P-CCNC: 49 U/L (ref 0–50)
ANION GAP SERPL CALCULATED.3IONS-SCNC: 14 MMOL/L (ref 7–15)
AST SERPL W P-5'-P-CCNC: 40 U/L (ref 0–45)
BASOPHILS # BLD AUTO: 0 10E3/UL (ref 0–0.2)
BASOPHILS NFR BLD AUTO: 0 %
BILIRUB SERPL-MCNC: 0.2 MG/DL
BUN SERPL-MCNC: 12 MG/DL (ref 6–20)
CALCIUM SERPL-MCNC: 9.7 MG/DL (ref 8.8–10.4)
CANCER AG15-3 SERPL-ACNC: 66 U/ML
CEA SERPL-MCNC: 3.3 NG/ML
CHLORIDE SERPL-SCNC: 104 MMOL/L (ref 98–107)
CREAT SERPL-MCNC: 0.87 MG/DL (ref 0.51–0.95)
EGFRCR SERPLBLD CKD-EPI 2021: 81 ML/MIN/1.73M2
EOSINOPHIL # BLD AUTO: 0.2 10E3/UL (ref 0–0.7)
EOSINOPHIL NFR BLD AUTO: 4 %
ERYTHROCYTE [DISTWIDTH] IN BLOOD BY AUTOMATED COUNT: 15.1 % (ref 10–15)
GLUCOSE SERPL-MCNC: 212 MG/DL (ref 70–99)
HCO3 SERPL-SCNC: 21 MMOL/L (ref 22–29)
HCT VFR BLD AUTO: 29.8 % (ref 35–47)
HGB BLD-MCNC: 9.2 G/DL (ref 11.7–15.7)
IMM GRANULOCYTES # BLD: 0 10E3/UL
IMM GRANULOCYTES NFR BLD: 0 %
LYMPHOCYTES # BLD AUTO: 1 10E3/UL (ref 0.8–5.3)
LYMPHOCYTES NFR BLD AUTO: 24 %
Lab: NORMAL
MCH RBC QN AUTO: 26.1 PG (ref 26.5–33)
MCHC RBC AUTO-ENTMCNC: 30.9 G/DL (ref 31.5–36.5)
MCV RBC AUTO: 85 FL (ref 78–100)
MONOCYTES # BLD AUTO: 0.3 10E3/UL (ref 0–1.3)
MONOCYTES NFR BLD AUTO: 8 %
NEUTROPHILS # BLD AUTO: 2.7 10E3/UL (ref 1.6–8.3)
NEUTROPHILS NFR BLD AUTO: 64 %
NRBC # BLD AUTO: 0 10E3/UL
NRBC BLD AUTO-RTO: 0 /100
PLATELET # BLD AUTO: 273 10E3/UL (ref 150–450)
POTASSIUM SERPL-SCNC: 3.5 MMOL/L (ref 3.4–5.3)
PROT SERPL-MCNC: 8 G/DL (ref 6.4–8.3)
RBC # BLD AUTO: 3.52 10E6/UL (ref 3.8–5.2)
SODIUM SERPL-SCNC: 139 MMOL/L (ref 135–145)
WBC # BLD AUTO: 4.2 10E3/UL (ref 4–11)

## 2025-08-04 PROCEDURE — 85025 COMPLETE CBC W/AUTO DIFF WBC: CPT | Performed by: PATHOLOGY

## 2025-08-04 PROCEDURE — 36415 COLL VENOUS BLD VENIPUNCTURE: CPT | Performed by: PATHOLOGY

## 2025-08-04 PROCEDURE — 86300 IMMUNOASSAY TUMOR CA 15-3: CPT | Performed by: PHYSICIAN ASSISTANT

## 2025-08-04 PROCEDURE — 99215 OFFICE O/P EST HI 40 MIN: CPT | Performed by: INTERNAL MEDICINE

## 2025-08-04 PROCEDURE — G0463 HOSPITAL OUTPT CLINIC VISIT: HCPCS | Performed by: INTERNAL MEDICINE

## 2025-08-04 PROCEDURE — 99000 SPECIMEN HANDLING OFFICE-LAB: CPT | Performed by: PATHOLOGY

## 2025-08-04 PROCEDURE — 82378 CARCINOEMBRYONIC ANTIGEN: CPT | Performed by: PHYSICIAN ASSISTANT

## 2025-08-04 PROCEDURE — 96402 CHEMO HORMON ANTINEOPL SQ/IM: CPT | Performed by: INTERNAL MEDICINE

## 2025-08-04 PROCEDURE — 80053 COMPREHEN METABOLIC PANEL: CPT | Performed by: PATHOLOGY

## 2025-08-04 PROCEDURE — 250N000011 HC RX IP 250 OP 636: Mod: JZ | Performed by: PHYSICIAN ASSISTANT

## 2025-08-04 RX ORDER — LAMOTRIGINE 25 MG/1
500 TABLET ORAL ONCE
Status: COMPLETED | OUTPATIENT
Start: 2025-08-04 | End: 2025-08-04

## 2025-08-04 RX ORDER — DEXAMETHASONE 0.5 MG/5ML
SOLUTION ORAL
Qty: 1200 ML | Refills: 1 | Status: SHIPPED | OUTPATIENT
Start: 2025-08-04

## 2025-08-04 RX ADMIN — FULVESTRANT 500 MG: 50 INJECTION INTRAMUSCULAR at 10:01

## 2025-08-06 ENCOUNTER — TELEPHONE (OUTPATIENT)
Dept: INTERNAL MEDICINE | Facility: CLINIC | Age: 50
End: 2025-08-06
Payer: MEDICARE

## 2025-08-06 ENCOUNTER — TELEPHONE (OUTPATIENT)
Dept: ENDOCRINOLOGY | Facility: CLINIC | Age: 50
End: 2025-08-06
Payer: MEDICARE

## 2025-08-06 DIAGNOSIS — E11.9 NEW ONSET TYPE 2 DIABETES MELLITUS (H): Primary | ICD-10-CM

## 2025-08-06 RX ORDER — KETOROLAC TROMETHAMINE 30 MG/ML
1 INJECTION, SOLUTION INTRAMUSCULAR; INTRAVENOUS SEE ADMIN INSTRUCTIONS
Qty: 1 EACH | Refills: 0 | Status: SHIPPED | OUTPATIENT
Start: 2025-08-06

## 2025-08-07 ENCOUNTER — TELEPHONE (OUTPATIENT)
Dept: ENDOCRINOLOGY | Facility: CLINIC | Age: 50
End: 2025-08-07
Payer: MEDICARE

## 2025-08-07 DIAGNOSIS — E11.9 NEW ONSET TYPE 2 DIABETES MELLITUS (H): ICD-10-CM

## 2025-08-07 RX ORDER — INSULIN LISPRO 100 [IU]/ML
30 INJECTION, SOLUTION INTRAVENOUS; SUBCUTANEOUS
Qty: 81 ML | Refills: 1 | Status: SHIPPED | OUTPATIENT
Start: 2025-08-07

## 2025-08-08 ENCOUNTER — TELEPHONE (OUTPATIENT)
Dept: ENDOCRINOLOGY | Facility: CLINIC | Age: 50
End: 2025-08-08
Payer: MEDICARE

## 2025-08-11 ENCOUNTER — TELEPHONE (OUTPATIENT)
Dept: ENDOCRINOLOGY | Facility: CLINIC | Age: 50
End: 2025-08-11
Payer: MEDICARE

## 2025-08-12 ENCOUNTER — MEDICAL CORRESPONDENCE (OUTPATIENT)
Dept: HEALTH INFORMATION MANAGEMENT | Facility: CLINIC | Age: 50
End: 2025-08-12
Payer: MEDICARE

## 2025-08-12 ENCOUNTER — PATIENT OUTREACH (OUTPATIENT)
Dept: CARE COORDINATION | Facility: CLINIC | Age: 50
End: 2025-08-12
Payer: MEDICARE

## 2025-08-13 ENCOUNTER — DOCUMENTATION ONLY (OUTPATIENT)
Dept: INTERNAL MEDICINE | Facility: CLINIC | Age: 50
End: 2025-08-13

## 2025-08-18 ENCOUNTER — TELEPHONE (OUTPATIENT)
Dept: ONCOLOGY | Facility: CLINIC | Age: 50
End: 2025-08-18

## 2025-08-27 ENCOUNTER — VIRTUAL VISIT (OUTPATIENT)
Dept: ONCOLOGY | Facility: CLINIC | Age: 50
End: 2025-08-27
Attending: STUDENT IN AN ORGANIZED HEALTH CARE EDUCATION/TRAINING PROGRAM
Payer: MEDICARE

## 2025-08-27 ENCOUNTER — PATIENT OUTREACH (OUTPATIENT)
Dept: ONCOLOGY | Facility: CLINIC | Age: 50
End: 2025-08-27
Payer: MEDICARE

## 2025-08-27 VITALS — HEIGHT: 71 IN | BODY MASS INDEX: 32.48 KG/M2 | WEIGHT: 232 LBS

## 2025-08-27 DIAGNOSIS — M54.42 CHRONIC BILATERAL LOW BACK PAIN WITH LEFT-SIDED SCIATICA: ICD-10-CM

## 2025-08-27 DIAGNOSIS — C79.51 PATHOLOGICAL FRACTURE OF VERTEBRA DUE TO MALIGNANT NEOPLASM METASTATIC TO BONE (H): ICD-10-CM

## 2025-08-27 DIAGNOSIS — C50.912 CARCINOMA OF LEFT BREAST METASTATIC TO BONE (H): Primary | ICD-10-CM

## 2025-08-27 DIAGNOSIS — C79.51 METASTASIS TO SPINAL COLUMN (H): ICD-10-CM

## 2025-08-27 DIAGNOSIS — Z17.0 MALIGNANT NEOPLASM OF OVERLAPPING SITES OF LEFT BREAST IN FEMALE, ESTROGEN RECEPTOR POSITIVE (H): Primary | ICD-10-CM

## 2025-08-27 DIAGNOSIS — M84.58XA PATHOLOGICAL FRACTURE OF VERTEBRA DUE TO MALIGNANT NEOPLASM METASTATIC TO BONE (H): ICD-10-CM

## 2025-08-27 DIAGNOSIS — Z51.5 ENCOUNTER FOR PALLIATIVE CARE: ICD-10-CM

## 2025-08-27 DIAGNOSIS — C79.51 CARCINOMA OF LEFT BREAST METASTATIC TO BONE (H): Primary | ICD-10-CM

## 2025-08-27 DIAGNOSIS — G89.29 CHRONIC BILATERAL LOW BACK PAIN WITH LEFT-SIDED SCIATICA: ICD-10-CM

## 2025-08-27 DIAGNOSIS — R53.81 PHYSICAL DECONDITIONING: ICD-10-CM

## 2025-08-27 DIAGNOSIS — Z79.891 ENCOUNTER FOR LONG-TERM USE OF OPIATE ANALGESIC: ICD-10-CM

## 2025-08-27 DIAGNOSIS — C50.812 MALIGNANT NEOPLASM OF OVERLAPPING SITES OF LEFT BREAST IN FEMALE, ESTROGEN RECEPTOR POSITIVE (H): Primary | ICD-10-CM

## 2025-08-27 DIAGNOSIS — G89.3 CANCER RELATED PAIN: ICD-10-CM

## 2025-08-27 RX ORDER — OXYCODONE HYDROCHLORIDE 5 MG/1
5-10 TABLET ORAL EVERY 4 HOURS PRN
Qty: 150 TABLET | Refills: 0 | Status: SHIPPED | OUTPATIENT
Start: 2025-08-27

## 2025-08-27 ASSESSMENT — PAIN SCALES - GENERAL: PAINLEVEL_OUTOF10: SEVERE PAIN (8)

## 2025-08-28 ENCOUNTER — PATIENT OUTREACH (OUTPATIENT)
Dept: CARE COORDINATION | Facility: CLINIC | Age: 50
End: 2025-08-28

## 2025-08-28 ENCOUNTER — TELEPHONE (OUTPATIENT)
Dept: ONCOLOGY | Facility: CLINIC | Age: 50
End: 2025-08-28

## 2025-08-28 DIAGNOSIS — Z17.0 MALIGNANT NEOPLASM OF OVERLAPPING SITES OF LEFT BREAST IN FEMALE, ESTROGEN RECEPTOR POSITIVE (H): ICD-10-CM

## 2025-08-28 DIAGNOSIS — C50.912 CARCINOMA OF LEFT BREAST METASTATIC TO BONE (H): Primary | ICD-10-CM

## 2025-08-28 DIAGNOSIS — C79.51 CARCINOMA OF LEFT BREAST METASTATIC TO BONE (H): Primary | ICD-10-CM

## 2025-08-28 DIAGNOSIS — C50.812 MALIGNANT NEOPLASM OF OVERLAPPING SITES OF LEFT BREAST IN FEMALE, ESTROGEN RECEPTOR POSITIVE (H): ICD-10-CM

## 2025-08-28 RX ORDER — EVEROLIMUS 10 MG/1
10 TABLET ORAL DAILY
Qty: 28 TABLET | Refills: 0 | Status: SHIPPED | OUTPATIENT
Start: 2025-08-28 | End: 2025-09-25

## 2025-09-02 ENCOUNTER — PATIENT OUTREACH (OUTPATIENT)
Dept: CARE COORDINATION | Facility: CLINIC | Age: 50
End: 2025-09-02

## 2025-09-02 ENCOUNTER — ONCOLOGY VISIT (OUTPATIENT)
Dept: ONCOLOGY | Facility: CLINIC | Age: 50
End: 2025-09-02
Attending: PHYSICIAN ASSISTANT
Payer: MEDICARE

## 2025-09-02 ENCOUNTER — TELEPHONE (OUTPATIENT)
Dept: ONCOLOGY | Facility: CLINIC | Age: 50
End: 2025-09-02

## 2025-09-02 VITALS
SYSTOLIC BLOOD PRESSURE: 138 MMHG | WEIGHT: 235.6 LBS | BODY MASS INDEX: 32.86 KG/M2 | OXYGEN SATURATION: 100 % | DIASTOLIC BLOOD PRESSURE: 85 MMHG | RESPIRATION RATE: 20 BRPM | TEMPERATURE: 97.5 F | HEART RATE: 80 BPM

## 2025-09-02 DIAGNOSIS — K13.79 MOUTH SORES: ICD-10-CM

## 2025-09-02 DIAGNOSIS — Z17.0 MALIGNANT NEOPLASM OF OVERLAPPING SITES OF LEFT BREAST IN FEMALE, ESTROGEN RECEPTOR POSITIVE (H): Primary | ICD-10-CM

## 2025-09-02 DIAGNOSIS — C50.812 MALIGNANT NEOPLASM OF OVERLAPPING SITES OF LEFT BREAST IN FEMALE, ESTROGEN RECEPTOR POSITIVE (H): Primary | ICD-10-CM

## 2025-09-02 DIAGNOSIS — C50.912 CARCINOMA OF LEFT BREAST METASTATIC TO BONE (H): ICD-10-CM

## 2025-09-02 DIAGNOSIS — Z79.899 ENCOUNTER FOR LONG-TERM (CURRENT) USE OF HIGH-RISK MEDICATION: ICD-10-CM

## 2025-09-02 DIAGNOSIS — C79.51 CARCINOMA OF LEFT BREAST METASTATIC TO BONE (H): ICD-10-CM

## 2025-09-02 LAB
ALBUMIN SERPL BCG-MCNC: 3.7 G/DL (ref 3.5–5.2)
ALP SERPL-CCNC: 140 U/L (ref 40–150)
ALT SERPL W P-5'-P-CCNC: 28 U/L (ref 0–50)
ANION GAP SERPL CALCULATED.3IONS-SCNC: 13 MMOL/L (ref 7–15)
AST SERPL W P-5'-P-CCNC: 30 U/L (ref 0–45)
BASOPHILS # BLD MANUAL: 0.03 10E3/UL (ref 0–0.2)
BASOPHILS NFR BLD MANUAL: 0.9 %
BILIRUB SERPL-MCNC: <0.2 MG/DL
BUN SERPL-MCNC: 12.6 MG/DL (ref 6–20)
CALCIUM SERPL-MCNC: 9.3 MG/DL (ref 8.8–10.4)
CANCER AG15-3 SERPL-ACNC: 68 U/ML
CEA SERPL-MCNC: 3.3 NG/ML
CHLORIDE SERPL-SCNC: 105 MMOL/L (ref 98–107)
CREAT SERPL-MCNC: 0.81 MG/DL (ref 0.51–0.95)
EGFRCR SERPLBLD CKD-EPI 2021: 88 ML/MIN/1.73M2
ELLIPTOCYTES BLD QL SMEAR: SLIGHT
EOSINOPHIL # BLD MANUAL: 0.28 10E3/UL (ref 0–0.7)
EOSINOPHIL NFR BLD MANUAL: 8.6 %
ERYTHROCYTE [DISTWIDTH] IN BLOOD BY AUTOMATED COUNT: 16.5 % (ref 10–15)
GLUCOSE SERPL-MCNC: 163 MG/DL (ref 70–99)
HCO3 SERPL-SCNC: 24 MMOL/L (ref 22–29)
HCT VFR BLD AUTO: 28.6 % (ref 35–47)
HGB BLD-MCNC: 8.6 G/DL (ref 11.7–15.7)
LYMPHOCYTES # BLD MANUAL: 0.78 10E3/UL (ref 0.8–5.3)
LYMPHOCYTES NFR BLD MANUAL: 23.9 %
MCH RBC QN AUTO: 24.4 PG (ref 26.5–33)
MCHC RBC AUTO-ENTMCNC: 30.1 G/DL (ref 31.5–36.5)
MCV RBC AUTO: 81.3 FL (ref 78–100)
MONOCYTES # BLD MANUAL: 0.17 10E3/UL (ref 0–1.3)
MONOCYTES NFR BLD MANUAL: 5.1 %
NEUTROPHILS # BLD MANUAL: 2.01 10E3/UL (ref 1.6–8.3)
NEUTROPHILS NFR BLD MANUAL: 61.5 %
PLAT MORPH BLD: ABNORMAL
PLATELET # BLD AUTO: 204 10E3/UL (ref 150–450)
POTASSIUM SERPL-SCNC: 3.5 MMOL/L (ref 3.4–5.3)
PROT SERPL-MCNC: 7.1 G/DL (ref 6.4–8.3)
RBC # BLD AUTO: 3.52 10E6/UL (ref 3.8–5.2)
RBC MORPH BLD: ABNORMAL
SODIUM SERPL-SCNC: 142 MMOL/L (ref 135–145)
WBC # BLD AUTO: 3.26 10E3/UL (ref 4–11)

## 2025-09-02 PROCEDURE — 96402 CHEMO HORMON ANTINEOPL SQ/IM: CPT | Performed by: PHYSICIAN ASSISTANT

## 2025-09-02 PROCEDURE — 99215 OFFICE O/P EST HI 40 MIN: CPT | Mod: 25 | Performed by: PHYSICIAN ASSISTANT

## 2025-09-02 PROCEDURE — G2211 COMPLEX E/M VISIT ADD ON: HCPCS | Performed by: PHYSICIAN ASSISTANT

## 2025-09-02 PROCEDURE — 85007 BL SMEAR W/DIFF WBC COUNT: CPT | Performed by: PHYSICIAN ASSISTANT

## 2025-09-02 PROCEDURE — 80053 COMPREHEN METABOLIC PANEL: CPT | Performed by: PHYSICIAN ASSISTANT

## 2025-09-02 PROCEDURE — 82378 CARCINOEMBRYONIC ANTIGEN: CPT | Performed by: PHYSICIAN ASSISTANT

## 2025-09-02 PROCEDURE — 85027 COMPLETE CBC AUTOMATED: CPT | Performed by: PHYSICIAN ASSISTANT

## 2025-09-02 PROCEDURE — G0463 HOSPITAL OUTPT CLINIC VISIT: HCPCS | Performed by: PHYSICIAN ASSISTANT

## 2025-09-02 PROCEDURE — 36415 COLL VENOUS BLD VENIPUNCTURE: CPT | Performed by: PHYSICIAN ASSISTANT

## 2025-09-02 PROCEDURE — 86300 IMMUNOASSAY TUMOR CA 15-3: CPT | Performed by: PHYSICIAN ASSISTANT

## 2025-09-02 PROCEDURE — 250N000011 HC RX IP 250 OP 636: Mod: JZ | Performed by: INTERNAL MEDICINE

## 2025-09-02 RX ORDER — LAMOTRIGINE 25 MG/1
500 TABLET ORAL ONCE
Status: COMPLETED | OUTPATIENT
Start: 2025-09-02 | End: 2025-09-02

## 2025-09-02 RX ADMIN — FULVESTRANT 500 MG: 50 INJECTION INTRAMUSCULAR at 10:29

## 2025-09-02 ASSESSMENT — PAIN SCALES - GENERAL: PAINLEVEL_OUTOF10: SEVERE PAIN (9)

## 2025-09-04 ENCOUNTER — TELEPHONE (OUTPATIENT)
Dept: ONCOLOGY | Facility: CLINIC | Age: 50
End: 2025-09-04
Payer: MEDICARE

## 2025-09-04 DIAGNOSIS — C79.51 CARCINOMA OF LEFT BREAST METASTATIC TO BONE (H): Primary | ICD-10-CM

## 2025-09-04 DIAGNOSIS — Z17.0 MALIGNANT NEOPLASM OF OVERLAPPING SITES OF LEFT BREAST IN FEMALE, ESTROGEN RECEPTOR POSITIVE (H): ICD-10-CM

## 2025-09-04 DIAGNOSIS — Z17.0 MALIGNANT NEOPLASM OF OVERLAPPING SITES OF LEFT BREAST IN FEMALE, ESTROGEN RECEPTOR POSITIVE (H): Primary | ICD-10-CM

## 2025-09-04 DIAGNOSIS — C79.51 CARCINOMA OF LEFT BREAST METASTATIC TO BONE (H): ICD-10-CM

## 2025-09-04 DIAGNOSIS — C50.912 CARCINOMA OF LEFT BREAST METASTATIC TO BONE (H): Primary | ICD-10-CM

## 2025-09-04 DIAGNOSIS — C50.812 MALIGNANT NEOPLASM OF OVERLAPPING SITES OF LEFT BREAST IN FEMALE, ESTROGEN RECEPTOR POSITIVE (H): Primary | ICD-10-CM

## 2025-09-04 DIAGNOSIS — C50.912 CARCINOMA OF LEFT BREAST METASTATIC TO BONE (H): ICD-10-CM

## 2025-09-04 DIAGNOSIS — C50.812 MALIGNANT NEOPLASM OF OVERLAPPING SITES OF LEFT BREAST IN FEMALE, ESTROGEN RECEPTOR POSITIVE (H): ICD-10-CM

## (undated) DEVICE — ENDO SNARE POLYPECTOMY OVAL 15MM LOOP SD-240U-15

## (undated) DEVICE — MARKER SPHERES PASSIVE MEDT PACK 5 8801075

## (undated) DEVICE — Device

## (undated) DEVICE — SUCTION MANIFOLD NEPTUNE 2 SYS 4 PORT 0702-020-000

## (undated) DEVICE — GOWN IMPERVIOUS 2XL BLUE

## (undated) DEVICE — BLADE CLIPPER SGL USE 9680

## (undated) DEVICE — SUCTION MANIFOLD NEPTUNE 2 SYS 1 PORT 702-025-000

## (undated) DEVICE — DRAPE C-ARM W/STRAPS 42X72" 07-CA104

## (undated) DEVICE — PREP POVIDONE-IODINE 7.5% SCRUB 4OZ BOTTLE MDS093945

## (undated) DEVICE — WIPES FOLEY CARE SURESTEP PROVON DFC100

## (undated) DEVICE — SYR 50ML SLIP TIP W/O NDL 309654

## (undated) DEVICE — DEVICE SUTURE GRASPER TROCAR CLOSURE 14GA PMITCSG

## (undated) DEVICE — PREP CHLORAPREP 26ML TINTED ORANGE  260815

## (undated) DEVICE — KOH COLPOTOMIZER OCCLUDER  CPO-6

## (undated) DEVICE — DRSG MEPILEX BORDER 6"X6" 595600

## (undated) DEVICE — ENDO FORCEP BX CAPTURA PRO SPIKE G50696

## (undated) DEVICE — PREP CHLORAPREP CLEAR 3ML 930400

## (undated) DEVICE — NDL INSUFFLATION 13GA 120MM C2201

## (undated) DEVICE — DRAPE STERI TOWEL LG 1010

## (undated) DEVICE — SU VICRYL 2-0 CT-2 CR 8X18" J726D

## (undated) DEVICE — SOL WATER IRRIG 500ML BOTTLE 2F7113

## (undated) DEVICE — LINEN TOWEL PACK X6 WHITE 5487

## (undated) DEVICE — PREP POVIDONE IODINE SOLUTION 10% 120ML

## (undated) DEVICE — PACK GOWN 3/PK DISP XL SBA32GPFCB

## (undated) DEVICE — DRSG STERI STRIP 1/2X4" R1547

## (undated) DEVICE — SU MONOCRYL 4-0 PS-2 27" UND Y426H

## (undated) DEVICE — ESU GROUND PAD ADULT W/CORD E7507

## (undated) DEVICE — SU VICRYL 0 TIE 54" J608H

## (undated) DEVICE — ESU CORD BIPOLAR GREEN 10-4000

## (undated) DEVICE — PACK LAP CHOLE CUSTOM ASC

## (undated) DEVICE — CATH TRAY FOLEY SURESTEP 16FR W/TMP PRB STLK LATEX A319416AM

## (undated) DEVICE — ENDO TROCAR FIRST ENTRY KII FIOS Z-THRD 05X100MM CTF03

## (undated) DEVICE — SNARE CAPIVATOR ROUND COLD SNR BX10 M00561101

## (undated) DEVICE — LINEN TOWEL PACK X5 5464

## (undated) DEVICE — ESU PENCIL W/COATED BLADE E2450H

## (undated) DEVICE — KIT ENDO TURNOVER/PROCEDURE CARRY-ON 101822

## (undated) DEVICE — JELLY LUBRICATING SURGILUBE 2OZ TUBE

## (undated) DEVICE — GLOVE EXAM NITRILE LG PF LATEX FREE 5064

## (undated) DEVICE — SUCTION IRR STRYKERFLOW II W/TIP 250-070-520

## (undated) DEVICE — BUR STRK CARBIDE MATCH HEAD 3.0MM 5820-107-530C

## (undated) DEVICE — DRAPE IOBAN INCISE 10X20CM 6635

## (undated) DEVICE — NEEDLE SPINAL DISP 18GA X 3.5" QUINCKE 333350

## (undated) DEVICE — DRAPE SHEET MED 44X70" 9355

## (undated) DEVICE — SPONGE SURGIFOAM 100 1974

## (undated) DEVICE — ENDO BITE BLOCK ADULT OMNI-BLOC

## (undated) DEVICE — ESU ELEC BLADE 2.75" COATED/INSULATED E1455

## (undated) DEVICE — DRAPE POUCH INSTRUMENT 1018

## (undated) DEVICE — DRSG PRIMAPORE 02X3" 7133

## (undated) DEVICE — DRAPE LAVH/LAPAROSCOPY W/POUCH 29474

## (undated) DEVICE — SOL NACL 0.9% INJ 1000ML BAG 2B1324X

## (undated) DEVICE — SU VICRYL 2-0 CT-2 27" J333H

## (undated) DEVICE — TUBING SUCTION 12"X1/4" N612

## (undated) DEVICE — KIT PATIENT POSITIONING PIGAZZI LATEX FREE 40580

## (undated) DEVICE — PREP SKIN SCRUB TRAY 4461A

## (undated) DEVICE — DRSG GAUZE 2X2" 8042

## (undated) DEVICE — ESU ENDO SCISSORS 5MM CVD 5DCS

## (undated) DEVICE — DRSG PRIMAPORE 03 1/8X6" 66000318

## (undated) DEVICE — ESU PENCIL SMOKE EVAC W/ROCKER SWITCH 0703-047-000

## (undated) DEVICE — ENDO TROCAR SLEEVE KII Z-THREADED 05X100MM CTS02

## (undated) DEVICE — GLOVE PROTEXIS BLUE W/NEU-THERA 7.5  2D73EB75

## (undated) DEVICE — SOL ADH LIQUID BENZOIN SWAB 0.6ML C1544

## (undated) DEVICE — COVER BIG CASE BACK TABLE 6'X2' 420-HD-S

## (undated) DEVICE — PREP POVIDONE IODINE SCRUB 7.5% 120ML

## (undated) DEVICE — SPONGE COTTONOID 1/2X1/2" 80-1400

## (undated) DEVICE — ESU LIGASURE LAPAROSCOPIC BLUNT TIP SEALER 5MMX37CM LF1837

## (undated) DEVICE — DRAPE MAYO STAND 23X54 8337

## (undated) DEVICE — ENDO POUCH UNIV RETRIEVAL SYSTEM INZII 10MM CD001

## (undated) DEVICE — GLOVE GAMMEX NEOPRENE ULTRA SZ 7.5 LF 8515

## (undated) DEVICE — DRAPE SHEET REV FOLD 3/4 9349

## (undated) DEVICE — ESU HOLSTER PLASTIC DISP E2400

## (undated) DEVICE — PACK NEURO MINOR UMMC SNE32MNMU4

## (undated) DEVICE — SOL NACL 0.9% IRRIG 1000ML BOTTLE 2F7124

## (undated) DEVICE — SPONGE SURGIFOAM 01GM POWDER 1978

## (undated) DEVICE — DRSG TEGADERM 2 3/8X2 3/4" 1624W

## (undated) DEVICE — TUBING SUCTION MEDI-VAC 1/4"X20' N620A

## (undated) DEVICE — SPECIMEN CONTAINER 3OZ W/FORMALIN 59901

## (undated) DEVICE — LINEN TOWEL PACK X30 5481

## (undated) DEVICE — SPECIMEN TRAP MUCOUS 40ML LUKI C30200A

## (undated) DEVICE — SU VICRYL 0 CT-1 CR 8X18" J740D

## (undated) DEVICE — SU ETHILON 3-0 PS-1 18" 1663H

## (undated) DEVICE — SYR BULB IRRIG DOVER 60 ML LATEX FREE 67000

## (undated) DEVICE — DRAPE O ARM TUBE 9732722

## (undated) DEVICE — PREP POVIDONE-IODINE 10% SOLUTION 4OZ BOTTLE MDS093944

## (undated) DEVICE — ENDO TRAP POLYP E-TRAP 00711099

## (undated) DEVICE — ESU ELEC BLADE 6" COATED/INSULATED E1455-6

## (undated) DEVICE — IOM SUPPLIES/CASE FEE

## (undated) DEVICE — SU ENDO STITCH POLYSORB 2-0 ES-9 48"  170053

## (undated) RX ORDER — FENTANYL CITRATE 50 UG/ML
INJECTION, SOLUTION INTRAMUSCULAR; INTRAVENOUS
Status: DISPENSED
Start: 2025-02-18

## (undated) RX ORDER — HYDROMORPHONE HYDROCHLORIDE 1 MG/ML
INJECTION, SOLUTION INTRAMUSCULAR; INTRAVENOUS; SUBCUTANEOUS
Status: DISPENSED
Start: 2021-05-17

## (undated) RX ORDER — HYDROMORPHONE HYDROCHLORIDE 1 MG/ML
INJECTION, SOLUTION INTRAMUSCULAR; INTRAVENOUS; SUBCUTANEOUS
Status: DISPENSED
Start: 2021-08-20

## (undated) RX ORDER — MEPERIDINE HYDROCHLORIDE 25 MG/ML
INJECTION INTRAMUSCULAR; INTRAVENOUS; SUBCUTANEOUS
Status: DISPENSED
Start: 2021-08-20

## (undated) RX ORDER — PROPOFOL 10 MG/ML
INJECTION, EMULSION INTRAVENOUS
Status: DISPENSED
Start: 2025-07-07

## (undated) RX ORDER — FENTANYL CITRATE 50 UG/ML
INJECTION, SOLUTION INTRAMUSCULAR; INTRAVENOUS
Status: DISPENSED
Start: 2025-07-07

## (undated) RX ORDER — KETOROLAC TROMETHAMINE 30 MG/ML
INJECTION, SOLUTION INTRAMUSCULAR; INTRAVENOUS
Status: DISPENSED
Start: 2021-08-20

## (undated) RX ORDER — ONDANSETRON 2 MG/ML
INJECTION INTRAMUSCULAR; INTRAVENOUS
Status: DISPENSED
Start: 2025-07-07

## (undated) RX ORDER — OXYCODONE HYDROCHLORIDE 10 MG/1
TABLET ORAL
Status: DISPENSED
Start: 2021-05-17

## (undated) RX ORDER — LIDOCAINE HYDROCHLORIDE 10 MG/ML
INJECTION, SOLUTION EPIDURAL; INFILTRATION; INTRACAUDAL; PERINEURAL
Status: DISPENSED
Start: 2023-01-23

## (undated) RX ORDER — PROPOFOL 10 MG/ML
INJECTION, EMULSION INTRAVENOUS
Status: DISPENSED
Start: 2021-08-20

## (undated) RX ORDER — ONDANSETRON 2 MG/ML
INJECTION INTRAMUSCULAR; INTRAVENOUS
Status: DISPENSED
Start: 2021-08-20

## (undated) RX ORDER — CEFAZOLIN SODIUM 2 G/100ML
INJECTION, SOLUTION INTRAVENOUS
Status: DISPENSED
Start: 2021-05-17

## (undated) RX ORDER — GLYCOPYRROLATE 0.2 MG/ML
INJECTION INTRAMUSCULAR; INTRAVENOUS
Status: DISPENSED
Start: 2021-08-20

## (undated) RX ORDER — LIDOCAINE HYDROCHLORIDE 10 MG/ML
INJECTION, SOLUTION EPIDURAL; INFILTRATION; INTRACAUDAL; PERINEURAL
Status: DISPENSED
Start: 2025-02-18

## (undated) RX ORDER — FENTANYL CITRATE 50 UG/ML
INJECTION, SOLUTION INTRAMUSCULAR; INTRAVENOUS
Status: DISPENSED
Start: 2021-08-20

## (undated) RX ORDER — ACETAMINOPHEN 325 MG/1
TABLET ORAL
Status: DISPENSED
Start: 2025-07-07

## (undated) RX ORDER — LIDOCAINE HYDROCHLORIDE 20 MG/ML
INJECTION, SOLUTION EPIDURAL; INFILTRATION; INTRACAUDAL; PERINEURAL
Status: DISPENSED
Start: 2021-08-20

## (undated) RX ORDER — FENTANYL CITRATE 50 UG/ML
INJECTION, SOLUTION INTRAMUSCULAR; INTRAVENOUS
Status: DISPENSED
Start: 2021-05-17

## (undated) RX ORDER — ACETAMINOPHEN 325 MG/1
TABLET ORAL
Status: DISPENSED
Start: 2021-05-17

## (undated) RX ORDER — DEXAMETHASONE SODIUM PHOSPHATE 4 MG/ML
INJECTION, SOLUTION INTRA-ARTICULAR; INTRALESIONAL; INTRAMUSCULAR; INTRAVENOUS; SOFT TISSUE
Status: DISPENSED
Start: 2021-08-20

## (undated) RX ORDER — HYDROMORPHONE HCL IN WATER/PF 6 MG/30 ML
PATIENT CONTROLLED ANALGESIA SYRINGE INTRAVENOUS
Status: DISPENSED
Start: 2025-07-07

## (undated) RX ORDER — CEFAZOLIN SODIUM 1 G/3ML
INJECTION, POWDER, FOR SOLUTION INTRAMUSCULAR; INTRAVENOUS
Status: DISPENSED
Start: 2025-07-07

## (undated) RX ORDER — PROPOFOL 10 MG/ML
INJECTION, EMULSION INTRAVENOUS
Status: DISPENSED
Start: 2021-05-17

## (undated) RX ORDER — OXYCODONE HYDROCHLORIDE 5 MG/1
TABLET ORAL
Status: DISPENSED
Start: 2021-08-20

## (undated) RX ORDER — BUPIVACAINE HYDROCHLORIDE 2.5 MG/ML
INJECTION, SOLUTION EPIDURAL; INFILTRATION; INTRACAUDAL
Status: DISPENSED
Start: 2021-08-20

## (undated) RX ORDER — HYDROMORPHONE HYDROCHLORIDE 1 MG/ML
INJECTION, SOLUTION INTRAMUSCULAR; INTRAVENOUS; SUBCUTANEOUS
Status: DISPENSED
Start: 2025-07-07

## (undated) RX ORDER — OXYCODONE HYDROCHLORIDE 5 MG/1
TABLET ORAL
Status: DISPENSED
Start: 2021-05-17

## (undated) RX ORDER — SODIUM CHLORIDE, SODIUM LACTATE, POTASSIUM CHLORIDE, CALCIUM CHLORIDE 600; 310; 30; 20 MG/100ML; MG/100ML; MG/100ML; MG/100ML
INJECTION, SOLUTION INTRAVENOUS
Status: DISPENSED
Start: 2021-05-17

## (undated) RX ORDER — DIPHENHYDRAMINE HYDROCHLORIDE 50 MG/ML
INJECTION INTRAMUSCULAR; INTRAVENOUS
Status: DISPENSED
Start: 2025-02-18

## (undated) RX ORDER — FENTANYL CITRATE-0.9 % NACL/PF 10 MCG/ML
PLASTIC BAG, INJECTION (ML) INTRAVENOUS
Status: DISPENSED
Start: 2021-08-20

## (undated) RX ORDER — MORPHINE SULFATE 15 MG/1
TABLET, FILM COATED, EXTENDED RELEASE ORAL
Status: DISPENSED
Start: 2021-05-17

## (undated) RX ORDER — CEFAZOLIN SODIUM 1 G/3ML
INJECTION, POWDER, FOR SOLUTION INTRAMUSCULAR; INTRAVENOUS
Status: DISPENSED
Start: 2021-08-20

## (undated) RX ORDER — BUPIVACAINE HYDROCHLORIDE 5 MG/ML
INJECTION, SOLUTION EPIDURAL; INTRACAUDAL
Status: DISPENSED
Start: 2021-05-17

## (undated) RX ORDER — PROMETHAZINE HYDROCHLORIDE 25 MG/ML
INJECTION, SOLUTION INTRAMUSCULAR; INTRAVENOUS
Status: DISPENSED
Start: 2021-08-20

## (undated) RX ORDER — SODIUM CHLORIDE, SODIUM LACTATE, POTASSIUM CHLORIDE, CALCIUM CHLORIDE 600; 310; 30; 20 MG/100ML; MG/100ML; MG/100ML; MG/100ML
INJECTION, SOLUTION INTRAVENOUS
Status: DISPENSED
Start: 2025-07-07

## (undated) RX ORDER — FENTANYL CITRATE-0.9 % NACL/PF 10 MCG/ML
PLASTIC BAG, INJECTION (ML) INTRAVENOUS
Status: DISPENSED
Start: 2025-07-07

## (undated) RX ORDER — METHOCARBAMOL 500 MG/1
TABLET, FILM COATED ORAL
Status: DISPENSED
Start: 2025-07-07

## (undated) RX ORDER — SODIUM CHLORIDE 9 MG/ML
INJECTION, SOLUTION INTRAVENOUS
Status: DISPENSED
Start: 2025-07-07

## (undated) RX ORDER — LIDOCAINE HYDROCHLORIDE AND EPINEPHRINE 10; 10 MG/ML; UG/ML
INJECTION, SOLUTION INFILTRATION; PERINEURAL
Status: DISPENSED
Start: 2025-07-07

## (undated) RX ORDER — DEXAMETHASONE SODIUM PHOSPHATE 4 MG/ML
INJECTION, SOLUTION INTRA-ARTICULAR; INTRALESIONAL; INTRAMUSCULAR; INTRAVENOUS; SOFT TISSUE
Status: DISPENSED
Start: 2025-07-07

## (undated) RX ORDER — OXYCODONE HYDROCHLORIDE 10 MG/1
TABLET ORAL
Status: DISPENSED
Start: 2025-07-07

## (undated) RX ORDER — GABAPENTIN 300 MG/1
CAPSULE ORAL
Status: DISPENSED
Start: 2025-07-07

## (undated) RX ORDER — ACETAMINOPHEN 325 MG/1
TABLET ORAL
Status: DISPENSED
Start: 2021-08-20

## (undated) RX ORDER — LIDOCAINE HYDROCHLORIDE 10 MG/ML
INJECTION, SOLUTION EPIDURAL; INFILTRATION; INTRACAUDAL; PERINEURAL
Status: DISPENSED
Start: 2022-12-08

## (undated) RX ORDER — CEFAZOLIN SODIUM 1 G/3ML
INJECTION, POWDER, FOR SOLUTION INTRAMUSCULAR; INTRAVENOUS
Status: DISPENSED
Start: 2021-05-17